# Patient Record
Sex: MALE | Race: WHITE | HISPANIC OR LATINO | Employment: PART TIME | URBAN - METROPOLITAN AREA
[De-identification: names, ages, dates, MRNs, and addresses within clinical notes are randomized per-mention and may not be internally consistent; named-entity substitution may affect disease eponyms.]

---

## 2018-05-04 ENCOUNTER — APPOINTMENT (OUTPATIENT)
Dept: TRANSPLANT | Facility: CLINIC | Age: 63
End: 2018-05-04
Attending: INTERNAL MEDICINE
Payer: MEDICARE

## 2018-05-04 ENCOUNTER — TELEPHONE (OUTPATIENT)
Dept: TRANSPLANT | Facility: CLINIC | Age: 63
End: 2018-05-04

## 2018-05-04 VITALS — HEIGHT: 69 IN | WEIGHT: 191 LBS | BODY MASS INDEX: 28.29 KG/M2

## 2018-05-04 NOTE — TELEPHONE ENCOUNTER
Intake Progress Note    Organ: Liver    Initial Call Made by: referral from [facility]    Referring Physician: Dr David Champagne    Assigned Coordinator: Noelle Carlton    Reported Diagnosis: Cirrhosis- Liver Cancer    On Dialysis:No    Dialysis Schedule:  Days/shift  or N/A    Reported Medical History:  Spoke with  -Cirrhosis- liver lesion- Inpt at [facility]    Records:  I will request.     Clinic RN Appt (Only Adult Kidney, Liver and Pancreas Referrals): Y or N    Preferred Evaluation Start Date:  ASAP     Online Forms    Best time patient can be reached:  anytime    Type of packet sent:  Liver packet    Misc. Notes: May speak with emergency contacts listed in OTTR.     Verbal Consent: Y or N    Email Consent: Y or N    If no PCP or referring information the time of call informed pt to call back with information: Y or N    Informed patient to call if doesn't receive packet and or phone call from coordinator within given time    Insurance-Medicare- [id number]- stated daughter working on insurance coverage- 80% Medicare and a supplement of the rest

## 2018-05-04 NOTE — LETTER
May 4, 2018    Loy Limon  [address]  [place] [address]    Dear Loy,    Thank you for your interest in the Transplant Center at [facility], [facility]. We look forward to being a part of your care team and assisting you through the transplant process.    As we discussed, your transplant coordinator is Noelle Carlton (Liver).  You may call your coordinator at any time with questions or concerns, call [phone] option [phone].    Please complete the following.    1. Fill out and return the enclosed forms    Authorization for Electronic Communication    Authorization to Discuss Protected Health Information    [employer] Release of Information    2. Sign up for:    [employer], access to your electronic medical record (see enclosed pamphlet)    [employer], a transplant education website (see enclosed booklet)    You can use these tools to learn more about your transplant, communicate with your care team, and track your medical details    Sincerely,    Solid Organ Transplant  [facility], [facility]    cc: Care Team

## 2018-05-08 ENCOUNTER — HOSPITAL ENCOUNTER (INPATIENT)
Dept: GENERAL RADIOLOGY | Facility: CLINIC | Age: 63
End: 2018-05-08
Attending: INTERNAL MEDICINE

## 2018-05-08 PROBLEM — K74.60 CIRRHOSIS OF LIVER (H): Status: ACTIVE | Noted: 2018-05-08

## 2018-05-08 PROBLEM — K76.9 LIVER LESION: Status: ACTIVE | Noted: 2018-05-08

## 2018-05-08 NOTE — TELEPHONE ENCOUNTER
61yo with hx alcoholic cirrhosis and new liver lesions, referred for treatment of lesions and transplant evaluation by Dr. Champgane at [facility]. Pt diagnosed with cirrhosis during a work up of abd pain in March 2018. Pt reports having quit drinking and smoking immediately following diagnosis of liver disease, last drink March 2018. Pt has not completed any CD treatment. Pt works part time, unable to work full time due to his health. Pt lives with his wife who works full time. Pt quit smoking a few weeks ago, prior to quitting smoked 1 pack per week for 40 years. Pt denies history or current drug use.     MELD: 13, per referring note from 3/15/18  Imaging: MRI done 3/16/18 at [facility]  Ascites: none  HE: none  GIB: none  EGD: last 4/4/18 at [facility], grade 2 varices[facility]  Colonscopy: last 3/28/18 at [facility], see [facility]     Plan:     Need to confirm insurance coverage- message sent to finance department.     Outside read of MRI order placed and plan for review at tumor conference Friday to determine plan of care.     Evaluation vs consult with Dr. Colvin on 5/21/18 due to last alcohol use and pending review of Antonio criteria.

## 2018-05-09 ENCOUNTER — MEDICAL CORRESPONDENCE (OUTPATIENT)
Dept: TRANSPLANT | Facility: CLINIC | Age: 63
End: 2018-05-09

## 2018-05-11 ENCOUNTER — DOCUMENTATION ONLY (OUTPATIENT)
Dept: TRANSPLANT | Facility: CLINIC | Age: 63
End: 2018-05-11

## 2018-05-11 NOTE — Clinical Note
Noelle - patient / family working on getting insurance. Checked in with Roya who is working on it with them. -tk

## 2018-05-14 NOTE — PROGRESS NOTES
3/16/18 MR at [facility]  1.) 3.7 cm 5B seg 8  2.) 1 cm 5a seg 8  3.) scattered OPTN 3s & 4s    Recs:  - get insurance / PA secure  - staging  - repeat MRI at [facility] given outside image done 2 months ago  - IR consult for treatment  - transplant evaluation  - oncology

## 2018-05-17 NOTE — TELEPHONE ENCOUNTER
Insurance coverage still pending. Transplant finance working with pt and his family to secure coverage. Plan to schedule pt as soon as coverage in place.     Call made to referring provider and message left updating him of above.

## 2018-05-23 ENCOUNTER — TELEPHONE (OUTPATIENT)
Dept: TRANSPLANT | Facility: CLINIC | Age: 63
End: 2018-05-23

## 2018-05-23 DIAGNOSIS — C22.0 HEPATOCELLULAR CARCINOMA (H): ICD-10-CM

## 2018-05-23 DIAGNOSIS — K74.60 CIRRHOSIS (H): ICD-10-CM

## 2018-05-23 NOTE — TELEPHONE ENCOUNTER
May 23, 2018: I spoke with Loy with the help of an  to ask him about attending appointments tomorrow (Thurs, 5/24) and next Tuesday (5/29). He asked me to call his daughter to discuss the schedule and transportation.    I called Kaylene, and she agreed to bring her father to appointments as stated above. I assured her I would send a PDF of his schedule via email. She was elated and will do what it takes to get her father to these appointments.    Frances Vasquez  Pre-Liver Transplant   [phone]    Previous Messages       ----- Message -----      From: Noelle Carlton, RN      Sent: 5/23/2018   1:17 PM        To: Frances Daniels,     Pt needs MRI, chest CT and bone scan prior to IR and onc consults ASAP.     Pt also put in for consult with Dr. Carballo 5/29/18 (IR priority, okay if we have to move hepatology consult to accommodate). Orders in place.     Thanks a lot,   Noelle                       Orders Only  5/23/2018       Noelle Carlton, RN - [facility] Solid Organ Transplant Encounter Summary       Diagnoses       Cirrhosis (H); Hepatocellular carcinoma (H)              Orders Signed This Encounter (15)      NUTRITION REFERRAL         REFERRAL        CBC with platelets        INR        AFP tumor marker        Hepatic panel        Basic metabolic panel        Antibody titer red cell        ABO/Rh type and screen        MRI Abdomen w & w/o contrast [IXJ783]        NM Bone whole body [AJD5535]        CT Chest w & w/o contrast [[id number]]        IR REFERRAL        ONC/HEME ADULT REFERRAL        GASTROENTEROLOGY ADULT REF CONSULT ONLY

## 2018-05-23 NOTE — LETTER
May 23, 2018  LIVER CONSULT SCHEDULE    Patient:   Loy Limon  MR#:    [id number]  Coordinator:  Noelle Carlton RN  [phone]  :    Frances HAYNES   [phone]  Location:    [facility]  Dates:   May 24, May 29 and May 31, 2018    This is your consultation schedule, please follow dates and times. You will receive reminder phone calls for other tests, but please follow this schedule only! If you have any questions about dates and times, please call us on the number listed above. Thank you, Transplant Services       No food or drink after midnight for MRI and CT (you may only have small amounts of water)     Day/Date:  Thursday, May 24, 2018  Time Location Activity   7:00 am Adult Imaging  ([address] [place] [address];  [address] [place] [address]; [facility] [address]) Abdominal MRI  NO FOOD OR DRINK AFTER MIDNIGHT   8:00 am Adult Imaging  ([address] [place] [address]) Bone Scan Injection   8:30 am Adult Imaging  ([address] [place] [address]) Chest CT  NO FOOD OR DRINK AFTER MIDNIGHT   11:00 am  to  12:00 pm Adult Imaging  ([address] [place] [address]) Bone Scan  (no fasting is required for this scan)     * IF ONLINE CHECK IN IS NOT DONE, YOU WILL NEED TO CHECK IN AT LEAST 15 MINUTES EARLIER THAN THE FIRST SCHEDULED APPOINTMENT *          Day/Date:  Tuesday, May 29, 2018  Time Location Activity   7:30 am Imaging and Lab testing  ([address] [place] [address] [facility],  [address] [place] [address]; [facility] [address]) Blood tests    8:00 am Transplant Services  ([address] [place] [address] and [facility]) Review evaluation process & daily schedule   8:30 am Transplant Services  ([address] [place] [address] and [facility]) Appointment with Uma Thacker  Registered Dietitian   9:00 am Medicine Specialties  ([address] [place] [address] and [facility]) Appointment with Dr. Carballo,  Hepatologist   10:00 am Transplant Services  ([address] [place] [address] and [facility]) Appointment with Peyton  Sophie     11:15 am Transplant Services  (3rd floor Clinics and [facility]) Check out with the Nursing Staff       Day/Date:  Thursday, May 31, 2018  Time Location Activity   4:00 pm [facility]  (2nd floor Clinics and Surgery Center,  [address] [place] [address]; [facility] [address]) Appointment with Dr. Melgar,  Oncologist

## 2018-05-24 ENCOUNTER — HOSPITAL ENCOUNTER (OUTPATIENT)
Dept: MRI IMAGING | Facility: CLINIC | Age: 63
End: 2018-05-24
Attending: INTERNAL MEDICINE
Payer: MEDICARE

## 2018-05-24 ENCOUNTER — HOSPITAL ENCOUNTER (OUTPATIENT)
Dept: NUCLEAR MEDICINE | Facility: CLINIC | Age: 63
Setting detail: NUCLEAR MEDICINE
Discharge: HOME | End: 2018-05-24
Attending: INTERNAL MEDICINE | Admitting: INTERNAL MEDICINE
Payer: MEDICARE

## 2018-05-24 ENCOUNTER — HOSPITAL ENCOUNTER (OUTPATIENT)
Dept: CT IMAGING | Facility: CLINIC | Age: 63
Discharge: HOME | End: 2018-05-24
Attending: INTERNAL MEDICINE | Admitting: INTERNAL MEDICINE
Payer: MEDICARE

## 2018-05-24 ENCOUNTER — HOSPITAL ENCOUNTER (OUTPATIENT)
Dept: NUCLEAR MEDICINE | Facility: CLINIC | Age: 63
Setting detail: NUCLEAR MEDICINE
End: 2018-05-24
Attending: INTERNAL MEDICINE
Payer: MEDICARE

## 2018-05-24 DIAGNOSIS — K74.60 CIRRHOSIS (H): ICD-10-CM

## 2018-05-24 PROCEDURE — 25000128 H RX IP 250 OP 636: Performed by: INTERNAL MEDICINE

## 2018-05-24 PROCEDURE — A9585 GADOBUTROL INJECTION: HCPCS | Performed by: INTERNAL MEDICINE

## 2018-05-24 PROCEDURE — 74183 MRI ABD W/O CNTR FLWD CNTR: CPT

## 2018-05-24 PROCEDURE — A9503 TC99M MEDRONATE: HCPCS | Performed by: INTERNAL MEDICINE

## 2018-05-24 PROCEDURE — 71260 CT THORAX DX C+: CPT

## 2018-05-24 PROCEDURE — 25000125 ZZHC RX 250: Performed by: INTERNAL MEDICINE

## 2018-05-24 PROCEDURE — 34300033 ZZH RX 343: Performed by: INTERNAL MEDICINE

## 2018-05-24 PROCEDURE — 78306 BONE IMAGING WHOLE BODY: CPT

## 2018-05-24 PROCEDURE — T1013 SIGN LANG/ORAL INTERPRETER: HCPCS | Mod: U3

## 2018-05-24 RX ORDER — GADOBUTROL 604.72 MG/ML
10 INJECTION INTRAVENOUS ONCE
Status: COMPLETED | OUTPATIENT
Start: 2018-05-24 | End: 2018-05-24

## 2018-05-24 RX ORDER — IOPAMIDOL 755 MG/ML
100 INJECTION, SOLUTION INTRAVASCULAR ONCE
Status: COMPLETED | OUTPATIENT
Start: 2018-05-24 | End: 2018-05-24

## 2018-05-24 RX ORDER — TC 99M MEDRONATE 20 MG/10ML
22.2 INJECTION, POWDER, LYOPHILIZED, FOR SOLUTION INTRAVENOUS ONCE
Status: COMPLETED | OUTPATIENT
Start: 2018-05-24 | End: 2018-05-24

## 2018-05-24 RX ADMIN — GADOBUTROL 8.5 ML: 604.72 INJECTION INTRAVENOUS at 07:23

## 2018-05-24 RX ADMIN — TC 99M MEDRONATE 22.2 MCI.: 20 INJECTION, POWDER, LYOPHILIZED, FOR SOLUTION INTRAVENOUS at 08:30

## 2018-05-24 RX ADMIN — SODIUM CHLORIDE 71 ML: 9 INJECTION, SOLUTION INTRAVENOUS at 08:39

## 2018-05-24 RX ADMIN — IOPAMIDOL 94 ML: 755 INJECTION, SOLUTION INTRAVENOUS at 08:39

## 2018-05-24 RX ADMIN — SODIUM CHLORIDE 80 ML: 9 INJECTION, SOLUTION INTRAVENOUS at 07:24

## 2018-05-29 ENCOUNTER — APPOINTMENT (OUTPATIENT)
Dept: LAB | Facility: CLINIC | Age: 63
End: 2018-05-29
Payer: MEDICARE

## 2018-05-29 ENCOUNTER — OFFICE VISIT (OUTPATIENT)
Dept: GASTROENTEROLOGY | Facility: CLINIC | Age: 63
End: 2018-05-29
Attending: INTERNAL MEDICINE
Payer: MEDICARE

## 2018-05-29 ENCOUNTER — COMMITTEE REVIEW (OUTPATIENT)
Dept: TRANSPLANT | Facility: CLINIC | Age: 63
End: 2018-05-29

## 2018-05-29 ENCOUNTER — TELEPHONE (OUTPATIENT)
Dept: TRANSPLANT | Facility: CLINIC | Age: 63
End: 2018-05-29

## 2018-05-29 ENCOUNTER — ALLIED HEALTH/NURSE VISIT (OUTPATIENT)
Dept: TRANSPLANT | Facility: CLINIC | Age: 63
End: 2018-05-29
Attending: INTERNAL MEDICINE
Payer: MEDICARE

## 2018-05-29 ENCOUNTER — OFFICE VISIT (OUTPATIENT)
Dept: TRANSPLANT | Facility: CLINIC | Age: 63
End: 2018-05-29
Attending: TRANSPLANT SURGERY
Payer: MEDICARE

## 2018-05-29 VITALS
HEIGHT: 68 IN | BODY MASS INDEX: 27.34 KG/M2 | TEMPERATURE: 98.3 F | HEART RATE: 71 BPM | WEIGHT: 180.4 LBS | SYSTOLIC BLOOD PRESSURE: 138 MMHG | OXYGEN SATURATION: 95 % | DIASTOLIC BLOOD PRESSURE: 69 MMHG

## 2018-05-29 VITALS
WEIGHT: 180.4 LBS | TEMPERATURE: 98.3 F | HEIGHT: 68 IN | DIASTOLIC BLOOD PRESSURE: 69 MMHG | RESPIRATION RATE: 18 BRPM | HEART RATE: 71 BPM | SYSTOLIC BLOOD PRESSURE: 138 MMHG | BODY MASS INDEX: 27.34 KG/M2 | OXYGEN SATURATION: 95 %

## 2018-05-29 DIAGNOSIS — K76.82 HEPATIC ENCEPHALOPATHY (H): Primary | ICD-10-CM

## 2018-05-29 DIAGNOSIS — F10.11 ALCOHOL ABUSE, IN REMISSION: ICD-10-CM

## 2018-05-29 DIAGNOSIS — C22.0 HEPATOCELLULAR CARCINOMA (H): ICD-10-CM

## 2018-05-29 DIAGNOSIS — K74.60 CIRRHOSIS (H): ICD-10-CM

## 2018-05-29 DIAGNOSIS — K70.30 ALCOHOLIC CIRRHOSIS OF LIVER WITHOUT ASCITES (H): Primary | ICD-10-CM

## 2018-05-29 DIAGNOSIS — Z76.82 AWAITING ORGAN TRANSPLANT STATUS: Primary | ICD-10-CM

## 2018-05-29 DIAGNOSIS — Z11.59 ENCOUNTER FOR SCREENING FOR OTHER VIRAL DISEASES (CODE): ICD-10-CM

## 2018-05-29 DIAGNOSIS — K74.60 CIRRHOSIS (H): Primary | ICD-10-CM

## 2018-05-29 DIAGNOSIS — F10.21 ALCOHOL DEPENDENCE IN REMISSION (H): ICD-10-CM

## 2018-05-29 DIAGNOSIS — K70.30 ALCOHOLIC CIRRHOSIS OF LIVER WITHOUT ASCITES (H): ICD-10-CM

## 2018-05-29 LAB
ABO + RH BLD: NORMAL
ABO + RH BLD: NORMAL
AFP SERPL-MCNC: 4 UG/L (ref 0–8)
ALBUMIN SERPL-MCNC: 2.6 G/DL (ref 3.4–5)
ALP SERPL-CCNC: 299 U/L (ref 40–150)
ALT SERPL W P-5'-P-CCNC: 64 U/L (ref 0–70)
ANION GAP SERPL CALCULATED.3IONS-SCNC: 6 MMOL/L (ref 3–14)
AST SERPL W P-5'-P-CCNC: 79 U/L (ref 0–45)
BILIRUB DIRECT SERPL-MCNC: 1.2 MG/DL (ref 0–0.2)
BILIRUB SERPL-MCNC: 2.2 MG/DL (ref 0.2–1.3)
BLD GP AB SCN SERPL QL: NORMAL
BLD GP AB SCN TITR SERPL: NORMAL {TITER}
BLOOD BANK CMNT PATIENT-IMP: NORMAL
BUN SERPL-MCNC: 12 MG/DL (ref 7–30)
CALCIUM SERPL-MCNC: 8.4 MG/DL (ref 8.5–10.1)
CHLORIDE SERPL-SCNC: 106 MMOL/L (ref 94–109)
CO2 SERPL-SCNC: 24 MMOL/L (ref 20–32)
CREAT SERPL-MCNC: 0.58 MG/DL (ref 0.66–1.25)
ERYTHROCYTE [DISTWIDTH] IN BLOOD BY AUTOMATED COUNT: 13.8 % (ref 10–15)
GFR SERPL CREATININE-BSD FRML MDRD: >90 ML/MIN/1.7M2
GLUCOSE SERPL-MCNC: 266 MG/DL (ref 70–99)
HCT VFR BLD AUTO: 37.1 % (ref 40–53)
HGB BLD-MCNC: 12.7 G/DL (ref 13.3–17.7)
INR PPP: 1.53 (ref 0.86–1.14)
MCH RBC QN AUTO: 33.4 PG (ref 26.5–33)
MCHC RBC AUTO-ENTMCNC: 34.2 G/DL (ref 31.5–36.5)
MCV RBC AUTO: 98 FL (ref 78–100)
PLATELET # BLD AUTO: 94 10E9/L (ref 150–450)
POTASSIUM SERPL-SCNC: 4.6 MMOL/L (ref 3.4–5.3)
PROT SERPL-MCNC: 6.9 G/DL (ref 6.8–8.8)
RBC # BLD AUTO: 3.8 10E12/L (ref 4.4–5.9)
SODIUM SERPL-SCNC: 136 MMOL/L (ref 133–144)
SPECIMEN EXP DATE BLD: NORMAL
WBC # BLD AUTO: 3 10E9/L (ref 4–11)

## 2018-05-29 PROCEDURE — 80076 HEPATIC FUNCTION PANEL: CPT | Performed by: INTERNAL MEDICINE

## 2018-05-29 PROCEDURE — 86900 BLOOD TYPING SEROLOGIC ABO: CPT | Performed by: INTERNAL MEDICINE

## 2018-05-29 PROCEDURE — 80048 BASIC METABOLIC PNL TOTAL CA: CPT | Performed by: INTERNAL MEDICINE

## 2018-05-29 PROCEDURE — 85610 PROTHROMBIN TIME: CPT | Performed by: INTERNAL MEDICINE

## 2018-05-29 PROCEDURE — T1013 SIGN LANG/ORAL INTERPRETER: HCPCS | Mod: U3,ZF

## 2018-05-29 PROCEDURE — 85027 COMPLETE CBC AUTOMATED: CPT | Performed by: INTERNAL MEDICINE

## 2018-05-29 PROCEDURE — 80321 ALCOHOLS BIOMARKERS 1OR 2: CPT | Performed by: INTERNAL MEDICINE

## 2018-05-29 PROCEDURE — G0463 HOSPITAL OUTPT CLINIC VISIT: HCPCS | Mod: ZF

## 2018-05-29 PROCEDURE — 40000724 ZZH UMP OPEN ENCOUNTER >70 DAYS

## 2018-05-29 PROCEDURE — 86886 COOMBS TEST INDIRECT TITER: CPT | Performed by: INTERNAL MEDICINE

## 2018-05-29 PROCEDURE — 86850 RBC ANTIBODY SCREEN: CPT | Performed by: INTERNAL MEDICINE

## 2018-05-29 PROCEDURE — 86901 BLOOD TYPING SEROLOGIC RH(D): CPT | Performed by: INTERNAL MEDICINE

## 2018-05-29 PROCEDURE — 36415 COLL VENOUS BLD VENIPUNCTURE: CPT | Performed by: INTERNAL MEDICINE

## 2018-05-29 PROCEDURE — 82105 ALPHA-FETOPROTEIN SERUM: CPT | Performed by: INTERNAL MEDICINE

## 2018-05-29 RX ORDER — TRAMADOL HYDROCHLORIDE 50 MG/1
50 TABLET ORAL
COMMUNITY
Start: 2018-03-08 | End: 2018-08-07

## 2018-05-29 ASSESSMENT — PAIN SCALES - GENERAL
PAINLEVEL: NO PAIN (0)
PAINLEVEL: NO PAIN (0)

## 2018-05-29 NOTE — PROGRESS NOTES
"Psychosocial Assessment for Liver Transplant Evaluation  Loy Limon was seen in the Transplant Center as part of his evaluation as a potential liver transplant recipient.  He was accompanied by his daughter Kaylene and a Austrian speaking .  Identifying information: Loy \"Washington" is a [age] year old [place] man who came to the [place] thirty-one years ago.  He is a [place] Citizen.    Living Situation:  Claribel lives with his wife Kevin Kruse in an apartment in [place], [place].  They have lived in the same apartment for the past twelve years, and Claribel denies any concerns with his living situation.  Education/Employment:  Claribel has never had any formal education in [place] or the [place].  He has been employed by [employer] for a year and seven months.  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  Claribel does not have short or long term disability benefits through his employer.    Financial /Income: Claribel and his wife have income from their employment.  Claribel should apply for SSDI benefits if he reduces his work schedule.  Health Insurance:  Claribel has applied for Medical Assistance for Employed Persons Disabled though he is currently uninsured.  Claribel will also become eligible for Medicare on June 1st, 2018.  He missed the initial enrollment period which would have made him eligible on May 1st, 2018.  His daughter Kaylene is helping him get connected to these insurance programs.  This writer talked with Claribel and his daughter about the financial risks of transplant, particularly about the high cost of transplant related medications and the importance of maintaining adequate health insurance coverage.  Family/Social Support: Claribel is  from his first wife, and he has been remarried to Kevin for the past ten years.  She is working today and was unable to attend Claribel' appointments.  Claribel has three adult children from his " first marriage.  His daughter Kaylene is the most involved, and she lives in [place], [place].  Claribel' two sons work construction and would not be available to provide care giving.  His daughter-in-law Cristina and granddaughter Elinor may be able to assist some.  They live in [place].  Claribel' father is  and his mother lives in [place], [place].  Claribel has one brother in [place] and five sisters all living in parts of [place].  This writer stressed the importance of having a stable and involved support network before and after transplant.  Provided Mitch and his daughter with education about the relationship between a stable support system and better surgical and post-transplant outcomes compared to patients with a limited support system.    Functional Status: Claribel's daughter assists with his medication management.  Claribel is otherwise independent with his ambulation, personal cares and driving.  Chemical Dependency:  Claribel quit smoking cigarettes four months ago.  He had been smoking two packs per week and some cigars.  Claribel denies any pain medication abuse/overuse or illicit drug use.  Claribel has Alcoholic Cirrhosis, diagnosed in March of this year, and that is when he quit drinking.  He did not have any prior knowledge of his liver disease.  Claribel estimates he was drinking two days per week, on the weekends he would drink a 24 pack of beer on Saturday and 6 beers on .   Claribel does have a history of one DUI nine years ago.  He has no chemical dependency treatment history.  Claribel did have a four year period of sobriety fourteen years ago.  Of note, one of Claribel' brothers  of alcoholic cirrhosis.  Mental Health: Claribel denies any mental health history, though he does report passive suicidal thoughts eighteen years ago.  He did contract with me today that he would contact his daughter Kaylene if he has any future suicidal thoughts.    Claribel denies any history of mental health treatment or  "hospitalization for mental health treatment.  Adjustment to Illness:  Claribel has Alcoholic Cirrhosis and Hepatocellular Carcinoma.  He says he has \"resigned\" himself to needing a liver transplant, and he says he is \"ready\" for whatever is recommended.    This writer provided Claribel and his daughter  with supportive counseling throughout this interview.  This writer also encouraged Claribel and his daughter to attend the liver transplant support group for additional support and encouragement.   Impression/Recommendations:   Claribel and his daughter Kaylene verbalize understanding the psychosocial risks of transplant and teaching provided during this evaluation.  Claribel has been sober since March of this year when diagnosed with Alcoholic Cirrhosis.  He does not yet meet the sobriety criteria recommended for listing.  This writer recommends Claribel complete a chemical dependency evaluation and follow any treatment recommendations.  Loy will need to complete a Rule 25 chemical dependency evaluation in [facility].  He has been given information about locations where this is provided.  I did suggest [facility] as they would have interpreters readily available.  Claribel has a stable living situation and adequate finances.  His health insurance will be adequate if/when his [place] Medical Assistance application is approved.  His daughter Kaylene is present today and demonstrating her support of patient.  We discussed the Caregiver Agreement for Liver Transplantation.  It will be important for Claribel' wife to attend the transplant education class and this was discussed with Claribel.   This writer will remain available to assist patient throughout the evaluation process and will follow patient through transplant if he is listed.  It was a pleasure to evaluate this patient for liver transplant.   Teaching completed during assessment:  1.     Housing and relocation needs post transplant.  2.     Caregiver needs post transplant.  3.     " Financial issues related to transplant.  4.     Risks of alcohol use post transplant.  5.     Common psychosocial stressors pre/post transplant.          6.     Liver Transplant support group availability.          7.     [facility] Directive-form and education provided             Psychosocial Risks of Transplant Reviewed:  1.     Increased stress related to your emotional, family, social, employment, or   financial situation.  2.     Affect on work and/or disability benefits.  3.     Affect on future health and life insurance.  4.     Transplant outcome expectations may not be met.  5.     Mental Health risks: anxiety, depression, PTSD, guilt, grief and chronic fatigue.     [employer]

## 2018-05-29 NOTE — MR AVS SNAPSHOT
After Visit Summary   5/29/2018    Loy Limon    MRN: [id number]           Patient Information     Date Of Birth          1955        Visit Information        Provider Department      5/29/2018 8:30 AM Víctor Smith Oca; Yazmin Thacker RD [facility] Solid Organ Transplant        Today's Diagnoses     Cirrhosis (H)    -  1       Follow-ups after your visit        Your next 10 appointments already scheduled     May 29, 2018  9:45 AM CDT   SOT SOCIAL WORK EVAL with ABRAHAM Prater   [facility] Solid Organ Transplant ([facility])    [address] [place] [address]  [address]  [facility] [address]   [phone]            May 31, 2018  3:45 PM CDT   New Patient Visit with Hi Melgar MD   [facility] ([facility])    [address] [place] [address]  [address]  [facility] [id number] [phone]            Jun 05, 2018  3:15 PM CDT   (Arrive by 3:00 PM)   New Patient Visit with Dario Anguiano MD   [facility] ([facility])    [address] [place] [address]  [address]  [facility] [address] [phone]              Who to contact     If you have questions or need follow up information about today's clinic visit or your schedule please contact [facility] SOLID ORGAN TRANSPLANT directly at [phone].  Normal or non-critical lab and imaging results will be communicated to you by MyChart, letter or phone within 4 business days after the clinic has received the results. If you do not hear from us within 7 days, please contact the clinic through MyChart or phone. If you have a critical or abnormal lab result, we will notify you by phone as soon as possible.  Submit refill requests through [employer]) or call your pharmacy and they will forward the refill request to us. Please allow 3 business days for your refill to be completed.          Additional Information About Your Visit        [facility]  "Information     [employer] lets you send messages to your doctor, view your test results, renew your prescriptions, schedule appointments and more. To sign up, go to [email or web address] . Click on \"Log in\" on the left side of the screen, which will take you to the Welcome page. Then click on \"Sign up Now\" on the right side of the page.     You will be asked to enter the access code listed below, as well as some personal information. Please follow the directions to create your username and password.     Your access code is: [email or web address]  Expires: 2018  8:03 AM     Your access code will  in 90 days. If you need help or a new code, please call your [facility] clinic or [phone].        Care EveryWhere ID     This is your Care EveryWhere ID. This could be used by other organizations to access your [facility] medical records  [id number]         Blood Pressure from Last 3 Encounters:   18 138/69   18 138/69    Weight from Last 3 Encounters:   18 81.8 kg (180 lb 6.4 oz)   18 81.8 kg (180 lb 6.4 oz)   18 86.6 kg (191 lb)              Today, you had the following     No orders found for display       Primary Care Provider    [facility]       [address] [address]        Equal Access to Services     [facility]: Hadii aad ku hadasho Soomaali, waaxda luqadaha, qaybta kaalmada adeshamayada, javier rico . So [facility] [place] [facility] [phone].    ATENCIÓN: Si habla español, tiene a samaniego disposición servicios gratuitos de asistencia lingüística. Llame al [phone].    We comply with applicable federal civil rights laws and [place] laws. We do not discriminate on the basis of race, color, national origin, age, disability, sex, sexual orientation, or gender identity.            Thank you!     Thank you for choosing [facility] SOLID ORGAN TRANSPLANT  for your care. Our goal is always to provide you with excellent care. Hearing back " from our patients is one way we can continue to improve our services. Please take a few minutes to complete the written survey that you may receive in the mail after your visit with us. Thank you!             Your Updated Medication List - Protect others around you: Learn how to safely use, store and throw away your medicines at www.disposemymeds.org.          This list is accurate as of 5/29/18  8:58 AM.  Always use your most recent med list.                   Brand Name Dispense Instructions for use Diagnosis    omeprazole 20 MG CR capsule    priLOSEC     Take 20 mg by mouth        traMADol 50 MG tablet    ULTRAM     Take 50 mg by mouth

## 2018-05-29 NOTE — LETTER
Date:May 31, 2018      Patient was self referred, no letter generated. Do not send.        [facility] Health Information

## 2018-05-29 NOTE — COMMITTEE REVIEW
Abdominal Committee Review Note     Evaluation Date: 5/29/2018  Committee Review Date: 5/29/2018    Organ being evaluated for: Liver    Transplant Phase: Evaluation  Transplant Status: Active    Transplant Coordinator: Noelle Carlton  Transplant Surgeon:       Referring Physician: Dr. David Champagne    Primary Diagnosis: HCC  Secondary Diagnosis: cirrhosis    Committee Review Members:  Nutrition Kathrin Thacker, RD   Pharmacy Kenrick Benitez, [facility]    - Clinical Peyton Barrios, ABRAHAM, Asmita Engel, [facility]   Transplant Leena Colvin MD, Jeny Szymanski, RN, Lisa White, RN, Jr Gerhard Freeman, RN, Camille Patel, APRN CNP, Becky Cruz, RN, Izabela Oropeza, RN, Tamela Carballo MD, Xander Nunes MD, Noelle Carlton, DALLIN, Elvia Field MD, Jovanni Harris MD       Transplant Eligibility: Cirrhosis with MELD, HCC    Committee Review Decision: Needs Re-presentation    Relative Contraindications: None    Absolute Contraindications: None    Committee Chair Dr. Carballo verbally attested to the committee's decision.    Committee Discussion Details:     Move forward with transplant evaluation.     Follow up with Dr. Carballo in 3 months.     HCC treatment as planned, IR and oncology referrals in place.

## 2018-05-29 NOTE — LETTER
5/29/2018       RE: Loy Limon  [address]  [facility] [address]     Dear Colleague,    Thank you for referring your patient, Loy Limon, to the [facility] SOLID ORGAN TRANSPLANT at [facility]. Please see a copy of my visit note below.    Assessment and Plan:  1. liver transplant evaluation - patient is a good candidate overall. Benefits and surgical risks of a liver transplantation were discussed.  2.  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 Portal Vein:Patent  2. Hepatic Artery: Open  3. TIPS: absent  4. Previous Abdominal Surgery: Yes  5. Hepatocellular Carcinoma: Yes   6. Ascites: Present - minimal  7. Costal Angle: wide  8. Portopulmonary Hypertension: absent  9. Hepatopulmonary Syndrome: absent  10. Cardiac Evaluation: needs echocardiogram  11. Nutritional Status: Moderate  12. Diabetes: No  13.Hypertension no  14. Smoker:no  115: Fraility index:no      Recommendations: cardiology, chemo embolization. Needs to see a Urologist ; needs to meet sobriety guidelines      Patients overall evaluation will be discussed at the Liver Transplant selection committee meeting with a final recommendation on the patients suitability for transplant to be made at that time.    Consult Full  Details:  Loy Limon was seen in consultation at the request of Dr. Champagne for evaluation as a potential liver transplant recipient.    Reason for Visit:  Loy Limon is a 63 year old year old male with hepatocelluar carcinoma, who presents for liver transplant evaluation.    HPI:  Presenting complaint: Liver cancer    Past Medical History:   Diagnosis Date     Cirrhosis of liver (H) 5/8/2018     Enlarged prostate      Inguinal hernia      Liver lesion 5/8/2018     Past Surgical History:   Procedure Laterality Date     APPENDECTOMY       Past Surgical History:   Procedure Laterality Date     APPENDECTOMY        No family history on file.  No Known Allergies  Prior to Admission medications    Medication Sig Start Date End Date Taking? Authorizing Provider   omeprazole (PRILOSEC) 20 MG CR capsule Take 20 mg by mouth 4/4/18 4/4/19  Reported, Patient   traMADol (ULTRAM) 50 MG tablet Take 50 mg by mouth 3/8/18   Reported, Patient       Previous Transplant Hx: No    Cardiovascular Hx:       h/o Cardiac Issues: No       Exercise Tolerance: shortness of breath with exertion.    Potential Donor(s): Yes -  [facility]    ROS:    REVIEW OF SYSTEMS (check box if normal)  [x]                GENERAL  [x]                  PULMONARY []                 GENITOURINARY: difficulty in urination  [x]                 CNS                 [x]                  CARDIAC  [x]                  ENDOCRINE  [x]                 EARS,NOSE,THROAT [x]                  GASTROINTESTINAL [x]                  NEUROLOGIC    [x]                 MUSCLOSKELTAL  [x]                   HEMATOLOGY    Examination:     Vitals:  There were no vitals taken for this visit.    GENERAL APPEARANCE: alert and no distress  EYES: PERRL  HENT: mouth without ulcers or lesions  NECK: supple, no adenopathy  RESP: lungs clear to auscultation - no rales, rhonchi or wheezes  CV: regular rhythm, normal rate, no rub   ABDOMEN:  soft, nontender, no HSM or masses and bowel sounds normal  MS: extremities normal- no gross deformities noted, no evidence of inflammation in joints, no muscle tenderness  SKIN: no rash  NEURO: Normal strength and tone, sensory exam grossly normal, mentation intact and speech normal  PSYCH: mentation appears normal. and affect normal/bright      Results:   Recent Results (from the past 168 hour(s))   ABO/Rh type and screen    Collection Time: 05/29/18  7:53 AM   Result Value Ref Range    ABO PENDING     Antibody Screen PENDING     Test Valid Only At          [facility]    Specimen Expires  06/01/2018    Basic metabolic panel    Collection Time: 05/29/18  7:54 AM   Result Value Ref Range    Sodium 136 133 - 144 mmol/L    Potassium 4.6 3.4 - 5.3 mmol/L    Chloride 106 94 - 109 mmol/L    Carbon Dioxide 24 20 - 32 mmol/L    Anion Gap 6 3 - 14 mmol/L    Glucose 266 (H) 70 - 99 mg/dL    Urea Nitrogen 12 7 - 30 mg/dL    Creatinine 0.58 (L) 0.66 - 1.25 mg/dL    GFR Estimate >90 >60 mL/min/1.7m2    GFR Estimate If Black >90 >60 mL/min/1.7m2    Calcium 8.4 (L) 8.5 - 10.1 mg/dL   Hepatic panel    Collection Time: 05/29/18  7:54 AM   Result Value Ref Range    Bilirubin Direct 1.2 (H) 0.0 - 0.2 mg/dL    Bilirubin Total 2.2 (H) 0.2 - 1.3 mg/dL    Albumin 2.6 (L) 3.4 - 5.0 g/dL    Protein Total 6.9 6.8 - 8.8 g/dL    Alkaline Phosphatase 299 (H) 40 - 150 U/L    ALT 64 0 - 70 U/L    AST 79 (H) 0 - 45 U/L   INR    Collection Time: 05/29/18  7:54 AM   Result Value Ref Range    INR 1.53 (H) 0.86 - 1.14   CBC with platelets    Collection Time: 05/29/18  7:54 AM   Result Value Ref Range    WBC 3.0 (L) 4.0 - 11.0 10e9/L    RBC Count 3.80 (L) 4.4 - 5.9 10e12/L    Hemoglobin 12.7 (L) 13.3 - 17.7 g/dL    Hematocrit 37.1 (L) 40.0 - 53.0 %    MCV 98 78 - 100 fl    MCH 33.4 (H) 26.5 - 33.0 pg    MCHC 34.2 31.5 - 36.5 g/dL    RDW 13.8 10.0 - 15.0 %    Platelet Count 94 (L) 150 - 450 10e9/L     I had a long discussion with the patient regarding liver transplantation which included but was not limited to  the following points:    1. Liver transplant selection committee process.  2. The federal rules for cadaveric waiting list, the size and blood type matching of the organ. The availability of living-related donor transplantation.  3. The types of donors: brain death donors, non-heart beating donors, partial liver grafts: splits and living donor grafts  4. Extended criteria  Donors (older age, steasosis) and the increased  risk of primary non-function using the extended criteria donors  5. The CDC high risk donors,  Risk of donor  transmitted infections and donor transmitted malignancy  6. The liver transplant operation and the associated risks and technical complications which can include intraoperative death, post operative death,  Primary non-function, bleeding requiring re-operations, arterial and biliary complications, bowel perforations, and intra abdominal abscess. Some of these complicaitons may require a second operation.  7. The postoperative course, the ICU stay and risk of postoperative complications which can include sepsis, MI, stroke, brain injury, pneumonia, pleural effusions, and renal dysfunction.  8. The current 1 year and 5 year graft and patient survivals.  9. The need for life long immunosuppressive therapy and the side effects of these medications, including the possibility of toxicity, opportunistic infections, risk of cancer including lymphoma, and the possibility of rejection even if the patient is taking the medication exactly as prescribed.  10. The need for compliance with medications and follow-up visits in the clinic and thereafter.  11. The patient and family understand these risks and wish to proceed to transplantation       I spent 60 minutes with the patient and more than 50% of the time was spend in direct face to face counseling.        Again, thank you for allowing me to participate in the care of your patient.      Sincerely,    Emil Echevarria MD        CC  [facility], ONCOLOGY    Copy to patient     [address]  [facility] [address]

## 2018-05-29 NOTE — MR AVS SNAPSHOT
After Visit Summary   5/29/2018    Loy Limon    MRN: [id number]           Patient Information     Date Of Birth          1955        Visit Information        Provider Department      5/29/2018 8:45 AM Víctor Smith Oca; Tamela Carballo MD [facility] Hepatology        Today's Diagnoses     Hepatic encephalopathy (H)    -  1    Alcoholic cirrhosis of liver without ascites (H)        Alcohol dependence in remission (H)        Alcohol abuse, in remission         Encounter for screening for other viral diseases (CODE)            Follow-ups after your visit        Your next 10 appointments already scheduled     May 31, 2018  3:45 PM CDT   New Patient Visit with Hi Melgar MD   [facility]    [address]  [address]  [facility] [address] [phone]            Jun 01, 2018  9:00 AM CDT   New Patient Visit with Debora Ac MD   [facility] ([facility])    [address]  [address]  [facility] [address] [phone]              Who to contact     If you have questions or need follow up information about today's clinic visit or your schedule please contact [facility] HEPATOLOGY directly at [phone].  Normal or non-critical lab and imaging results will be communicated to you by Engagorhart, letter or phone within 4 business days after the clinic has received the results. If you do not hear from us within 7 days, please contact the clinic through Engagorhart or phone. If you have a critical or abnormal lab result, we will notify you by phone as soon as possible.  Submit refill requests through Broadband Voice or call your pharmacy and they will forward the refill request to us. Please allow 3 business days for your refill to be completed.          Additional Information About Your Visit        Broadband Voice Information     Broadband Voice lets you send messages to your doctor,  "view your test results, renew your prescriptions, schedule appointments and more. To sign up, go to [email or web address] . Click on \"Log in\" on the left side of the screen, which will take you to the Welcome page. Then click on \"Sign up Now\" on the right side of the page.     You will be asked to enter the access code listed below, as well as some personal information. Please follow the directions to create your username and password.     Your access code is: [email or web address]  Expires: 2018  8:03 AM     Your access code will  in 90 days. If you need help or a new code, please call your [place] clinic or [phone].        Care EveryWhere ID     This is your Care EveryWhere ID. This could be used by other organizations to access your [place] medical records  [id number]        Your Vitals Were     Pulse Temperature Respirations Height Pulse Oximetry BMI (Body Mass Index)    71 98.3  F (36.8  C) (Oral) 18 1.721 m (5' 7.75\") 95% 27.63 kg/m2       Blood Pressure from Last 3 Encounters:   18 138/69   18 138/69    Weight from Last 3 Encounters:   18 81.8 kg (180 lb 6.4 oz)   18 81.8 kg (180 lb 6.4 oz)   18 86.6 kg (191 lb)              We Performed the Following     Ethyl Glucuronide Urine          Today's Medication Changes          These changes are accurate as of 18 11:59 PM.  If you have any questions, ask your nurse or doctor.               Start taking these medicines.        Dose/Directions    rifaximin 550 MG Tabs tablet   Commonly known as:  XIFAXAN   Used for:  Hepatic encephalopathy (H)   Started by:  Tamela Carballo MD        Dose:  550 mg   Take 1 tablet (550 mg) by mouth 2 times daily   Quantity:  60 tablet   Refills:  3            Where to get your medicines      These medications were [place] to [facility] -  [address], [facility] [address]     Phone:  [phone]     rifaximin " 550 MG Tabs tablet               Information about OPIOIDS     PRESCRIPTION OPIOIDS: WHAT YOU NEED TO KNOW   You have a prescription for an opioid (narcotic) pain medicine. Opioids can cause addiction. If you have a history of chemical dependency of any type, you are at a higher risk of becoming addicted to opioids. Only take this medicine after all other options have been tried. Take it for as short a time and as few doses as possible.     Do not:    Drive. If you drive while taking these medicines, you could be arrested for driving under the influence (DUI).    Operate heavy machinery    Do any other dangerous activities while taking these medicines.     Drink any alcohol while taking these medicines.      Take with any other medicines that contain acetaminophen. Read all labels carefully. Look for the word  acetaminophen  or  Tylenol.  Ask your pharmacist if you have questions or are unsure.    Store your pills in a secure place, locked if possible. We will not replace any lost or stolen medicine. If you don t finish your medicine, please throw away (dispose) as directed by your pharmacist. The [place] Pollution Control Agency has more information about safe disposal: https://www.pca.[facility].[place].us/living-green/managing-unwanted-medications    All opioids tend to cause constipation. Drink plenty of water and eat foods that have a lot of fiber, such as fruits, vegetables, prune juice, apple juice and high-fiber cereal. Take a laxative (Miralax, milk of magnesia, Colace, Senna) if you don t move your bowels at least every other day.          Primary Care Provider    [facility] MD       [address] [place] [address]        Equal Access to Services     URIEL COWAN : Hadii kostas giraldo [place], waaxda luashleyadaha, qaybta kaalmada javier hill . So [facility] [phone].    ATENCIÓN: Si habla español, tiene a samaniego disposición servicios gratuitos de  asistencia lingüística. Earle al [phone].    We comply with applicable federal civil rights laws and [place] laws. We do not discriminate on the basis of race, color, national origin, age, disability, sex, sexual orientation, or gender identity.            Thank you!     Thank you for choosing [facility] HEPATOLOGY  for your care. Our goal is always to provide you with excellent care. Hearing back from our patients is one way we can continue to improve our services. Please take a few minutes to complete the written survey that you may receive in the mail after your visit with us. Thank you!             Your Updated Medication List - Protect others around you: Learn how to safely use, store and throw away your medicines at www.disposemymeds.org.          This list is accurate as of 5/29/18 11:59 PM.  Always use your most recent med list.                   Brand Name Dispense Instructions for use Diagnosis    omeprazole 20 MG CR capsule    priLOSEC     Take 20 mg by mouth        rifaximin 550 MG Tabs tablet    XIFAXAN    60 tablet    Take 1 tablet (550 mg) by mouth 2 times daily    Hepatic encephalopathy (H)       traMADol 50 MG tablet    ULTRAM     Take 50 mg by mouth

## 2018-05-29 NOTE — MR AVS SNAPSHOT
After Visit Summary   5/29/2018    Loy Limon    MRN: [id number]           Patient Information     Date Of Birth          1955        Visit Information        Provider Department      5/29/2018 11:30 AM Emil Echevarria MD [facility] Solid Organ Transplant        Today's Diagnoses     Alcoholic cirrhosis of liver without ascites (H)    -  1    Hepatocellular carcinoma (H)           Follow-ups after your visit        Your next 10 appointments already scheduled     May 29, 2018 11:30 AM CDT   (Arrive by 11:15 AM)   Liver Transplant Pre-Op with Emil Echevarria MD   [facility] Organ Transplant ([facility])    [address] [place] [address]  [address]  [facility] [address] [phone]            May 31, 2018  3:45 PM CDT   New Patient Visit with Hi Melgar MD   [facility] ([facility])    [address]  [address]  [facility] [address] [phone]            Jun 05, 2018  3:15 PM CDT   (Arrive by 3:00 PM)   New Patient Visit with Dario Anguiano MD   [facility] ([facility])    [address] [place] [address]  [address]  [facility] [id number] [phone]              Future tests that were ordered for you today     Open Future Orders        Priority Expected Expires Ordered    Hepatitis C antibody Routine  6/28/2018 5/29/2018    Hepatitis B surface antigen Routine  6/28/2018 5/29/2018    Hepatitis B core antibody Routine  6/28/2018 5/29/2018    Hepatitis B Surface Antibody Routine  6/28/2018 5/29/2018    Hepatitis Antibody A IgG Routine  6/28/2018 5/29/2018            Who to contact     If you have questions or need follow up information about today's clinic visit or your schedule please contact [facility] SOLID ORGAN TRANSPLANT directly at [phone].  Normal or non-critical lab and imaging results will be communicated to you by MyChart, letter or phone within 4  "business days after the clinic has received the results. If you do not hear from us within 7 days, please contact the clinic through Quid or phone. If you have a critical or abnormal lab result, we will notify you by phone as soon as possible.  Submit refill requests through Quid or call your pharmacy and they will forward the refill request to us. Please allow 3 business days for your refill to be completed.          Additional Information About Your Visit        Quid Information     Quid lets you send messages to your doctor, view your test results, renew your prescriptions, schedule appointments and more. To sign up, go to [email or web address] . Click on \"Log in\" on the left side of the screen, which will take you to the Welcome page. Then click on \"Sign up Now\" on the right side of the page.     You will be asked to enter the access code listed below, as well as some personal information. Please follow the directions to create your username and password.     Your access code is: [email or web address]  Expires: 2018  8:03 AM     Your access code will  in 90 days. If you need help or a new code, please call your [place] clinic or [phone].        Care EveryWhere ID     This is your Care EveryWhere ID. This could be used by other organizations to access your [facility] medical records  [id number]         Blood Pressure from Last 3 Encounters:   18 138/69   18 138/69    Weight from Last 3 Encounters:   18 81.8 kg (180 lb 6.4 oz)   18 81.8 kg (180 lb 6.4 oz)   18 86.6 kg (191 lb)              Today, you had the following     No orders found for display         Today's Medication Changes          These changes are accurate as of 18 10:31 AM.  If you have any questions, ask your nurse or doctor.               Start taking these medicines.        Dose/Directions    rifaximin 550 MG Tabs tablet   Commonly known as:  XIFAXAN   Used for:  Hepatic encephalopathy (H) "   Started by:  Tamela Carballo MD        Dose:  550 mg   Take 1 tablet (550 mg) by mouth 2 times daily   Quantity:  60 tablet   Refills:  3            Where to get your medicines      These medications were sent to [facility] - [address]  [address], [facility] [address]     Phone:  [phone]     rifaximin 550 MG Tabs tablet                Primary Care Provider    [facility] MD       [address] [address]        Equal Access to Services     [facility]: Hadii aad ku hadasho Soomaali, waaxda luqadaha, qaybta kaalmada adeegyada, waxay vonin hayannia rico . So [facility] [phone].    ATENCIÓN: Si habla español, tiene a samaniego disposición servicios gratuitos de asistencia lingüística. [facility] [phone].    We comply with applicable federal civil rights laws and [place] laws. We do not discriminate on the basis of race, color, national origin, age, disability, sex, sexual orientation, or gender identity.            Thank you!     Thank you for choosing [facility] SOLID ORGAN TRANSPLANT  for your care. Our goal is always to provide you with excellent care. Hearing back from our patients is one way we can continue to improve our services. Please take a few minutes to complete the written survey that you may receive in the mail after your visit with us. Thank you!             Your Updated Medication List - Protect others around you: Learn how to safely use, store and throw away your medicines at www.disposemymeds.org.          This list is accurate as of 5/29/18 10:31 AM.  Always use your most recent med list.                   Brand Name Dispense Instructions for use Diagnosis    omeprazole 20 MG CR capsule    priLOSEC     Take 20 mg by mouth        rifaximin 550 MG Tabs tablet    XIFAXAN    60 tablet    Take 1 tablet (550 mg) by mouth 2 times daily    Hepatic encephalopathy (H)       traMADol  50 MG tablet    ULTRAM     Take 50 mg by mouth

## 2018-05-29 NOTE — PROGRESS NOTES
"Outpatient MNT: Liver Consult    Time Spent: 30 minutes  Visit Type: Initial  Referring Physician: Dr aCrballo   Pt accompanied by: his daughter and a      History of previous txp: none     Nutrition Assessment  Pt cooks for self and reports adding some salt when needed, however, cooks most foods from scratch and does not really cook with processed foods.     Appetite: very good     Vitamins, Supplements, Pertinent Meds: \"whatever they give me\"  Herbal Medicines/Supplements: pt reports starting a natural supplement (moringa) 3 weeks ago as an alternative to another medication that a doctor prescribed that was giving him GI pain and burning. He reports this supplement induces diarrhea and helps rejuvenate tissues in the body.     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 ?pedal edema    Adj/dosing BW: 180 lbs/82 kg       Malnutrition  % Intake: No decreased intake noted  % Weight Loss: None noted  Subcutaneous Fat Loss: None  Muscle Loss: None  Fluid Accumulation/Edema: unknown  Malnutrition Diagnosis: Patient does not meet two of the above criteria necessary for diagnosing malnutrition     Estimated Nutrition Needs  Energy  6263-1001     (25-30 kcal/kg for maintenance)     Protein  66-82    (0.8-1 g/kg for maintenance)           Fluid  1 ml/kcal or per MD        Micronutrient   Na+: <2000 mg/day            Nutrition Diagnosis  Food " and nutrition related knowledge deficit r/t no previous formal diet ed for liver disease AEB RD referral.     Nutrition Intervention  Nutrition education provided:  Discussed sodium intake (low sodium foods and drinks, seasoning food without salt and tips for low sodium diet). Overall pt doing well with lower sodium, but did discuss reducing added salt by a little bit.     Reviewed adequate protein intake. Encouraged receiving protein from both animal and plant based sources.     Did not review post transplant diet guidelines at this time d/t liver consult only. However, did review that the natural supplement he is taking will be off limits if he were to get a transplant. Explained why.     Patient Understanding: Pt verbalized understanding of education provided.  Expected Compliance: Good  Follow-Up Plans: PRN     Nutrition Goals  Limit Na+ <2000mg/day    Provided pt with contact info.   Kathrin Thacker RD, LD  Pgr [phone]

## 2018-05-29 NOTE — PROGRESS NOTES
Physician Attestation   I, Emil Echevarria, personally saw and evaluated Loy Limon as part of a shared visit.  I have reviewed and discussed with the advanced practice provider their assesment plan.      Key management decisions made by me: counselling about risk of liver transplantaijames Stein Yovanisonalimarcinlatha  Date of Service (when I saw the patient): 5/29/2018  Assessment and Plan:  1. liver transplant evaluation - patient is a good candidate overall. Benefits and surgical risks of a liver transplantation were discussed.  2.  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 Portal Vein:Patent  2. Hepatic Artery: Open  3. TIPS: absent  4. Previous Abdominal Surgery: Yes  5. Hepatocellular Carcinoma: Yes   6. Ascites: Present - minimal  7. Costal Angle: wide  8. Portopulmonary Hypertension: absent  9. Hepatopulmonary Syndrome: absent  10. Cardiac Evaluation: needs echocardiogram  11. Nutritional Status: Moderate  12. Diabetes: No  13.Hypertension no  14. Smoker:no  115: Fraility index:no      Recommendations: cardiology, chemo embolization. Needs to see a Urologist ; needs to meet sobriety guidelines      Patients overall evaluation will be discussed at the Liver Transplant selection committee meeting with a final recommendation on the patients suitability for transplant to be made at that time.    Consult Full  Details:  Loy Limon was seen in consultation at the request of Dr. Champagne for evaluation as a potential liver transplant recipient.    Reason for Visit:  Loy Limon is a 63 year old year old male with hepatocelluar carcinoma, who presents for liver transplant evaluation.    HPI:  Presenting complaint: Liver cancer    Past Medical History:   Diagnosis Date     Cirrhosis of liver (H) 5/8/2018     Enlarged prostate      Inguinal hernia      Liver lesion 5/8/2018     Past Surgical History:   Procedure Laterality Date     APPENDECTOMY        Past Surgical History:   Procedure Laterality Date     APPENDECTOMY       No family history on file.  No Known Allergies  Prior to Admission medications    Medication Sig Start Date End Date Taking? Authorizing Provider   omeprazole (PRILOSEC) 20 MG CR capsule Take 20 mg by mouth 4/4/18 4/4/19  Reported, Patient   traMADol (ULTRAM) 50 MG tablet Take 50 mg by mouth 3/8/18   Reported, Patient       Previous Transplant Hx: No    Cardiovascular Hx:       h/o Cardiac Issues: No       Exercise Tolerance: shortness of breath with exertion.    Potential Donor(s): Yes -  [facility]    ROS:    REVIEW OF SYSTEMS (check box if normal)  [x]                GENERAL  [x]                  PULMONARY []                 GENITOURINARY: difficulty in urination  [x]                 CNS                 [x]                  CARDIAC  [x]                  ENDOCRINE  [x]                 EARS,NOSE,THROAT [x]                  GASTROINTESTINAL [x]                  NEUROLOGIC    [x]                 MUSCLOSKELTAL  [x]                   HEMATOLOGY    Examination:     Vitals:  There were no vitals taken for this visit.    GENERAL APPEARANCE: alert and no distress  EYES: PERRL  HENT: mouth without ulcers or lesions  NECK: supple, no adenopathy  RESP: lungs clear to auscultation - no rales, rhonchi or wheezes  CV: regular rhythm, normal rate, no rub   ABDOMEN:  soft, nontender, no HSM or masses and bowel sounds normal  MS: extremities normal- no gross deformities noted, no evidence of inflammation in joints, no muscle tenderness  SKIN: no rash  NEURO: Normal strength and tone, sensory exam grossly normal, mentation intact and speech normal  PSYCH: mentation appears normal. and affect normal/bright      Results:   Recent Results (from the past 168 hour(s))   ABO/Rh type and screen    Collection Time: 05/29/18  7:53 AM   Result Value Ref Range    ABO PENDING     Antibody Screen PENDING     Test Valid Only At           [facility]    Specimen Expires 06/01/2018    Basic metabolic panel    Collection Time: 05/29/18  7:54 AM   Result Value Ref Range    Sodium 136 133 - 144 mmol/L    Potassium 4.6 3.4 - 5.3 mmol/L    Chloride 106 94 - 109 mmol/L    Carbon Dioxide 24 20 - 32 mmol/L    Anion Gap 6 3 - 14 mmol/L    Glucose 266 (H) 70 - 99 mg/dL    Urea Nitrogen 12 7 - 30 mg/dL    Creatinine 0.58 (L) 0.66 - 1.25 mg/dL    GFR Estimate >90 >60 mL/min/1.7m2    GFR Estimate If Black >90 >60 mL/min/1.7m2    Calcium 8.4 (L) 8.5 - 10.1 mg/dL   Hepatic panel    Collection Time: 05/29/18  7:54 AM   Result Value Ref Range    Bilirubin Direct 1.2 (H) 0.0 - 0.2 mg/dL    Bilirubin Total 2.2 (H) 0.2 - 1.3 mg/dL    Albumin 2.6 (L) 3.4 - 5.0 g/dL    Protein Total 6.9 6.8 - 8.8 g/dL    Alkaline Phosphatase 299 (H) 40 - 150 U/L    ALT 64 0 - 70 U/L    AST 79 (H) 0 - 45 U/L   INR    Collection Time: 05/29/18  7:54 AM   Result Value Ref Range    INR 1.53 (H) 0.86 - 1.14   CBC with platelets    Collection Time: 05/29/18  7:54 AM   Result Value Ref Range    WBC 3.0 (L) 4.0 - 11.0 10e9/L    RBC Count 3.80 (L) 4.4 - 5.9 10e12/L    Hemoglobin 12.7 (L) 13.3 - 17.7 g/dL    Hematocrit 37.1 (L) 40.0 - 53.0 %    MCV 98 78 - 100 fl    MCH 33.4 (H) 26.5 - 33.0 pg    MCHC 34.2 31.5 - 36.5 g/dL    RDW 13.8 10.0 - 15.0 %    Platelet Count 94 (L) 150 - 450 10e9/L     I had a long discussion with the patient regarding liver transplantation which included but was not limited to  the following points:    1. Liver transplant selection committee process.  2. The federal rules for cadaveric waiting list, the size and blood type matching of the organ. The availability of living-related donor transplantation.  3. The types of donors: brain death donors, non-heart beating donors, partial liver grafts: splits and living donor grafts  4. Extended criteria  Donors (older age, steasosis) and the increased  risk of primary non-function using  the extended criteria donors  5. The CDC high risk donors,  Risk of donor transmitted infections and donor transmitted malignancy  6. The liver transplant operation and the associated risks and technical complications which can include intraoperative death, post operative death,  Primary non-function, bleeding requiring re-operations, arterial and biliary complications, bowel perforations, and intra abdominal abscess. Some of these complicaitons may require a second operation.  7. The postoperative course, the ICU stay and risk of postoperative complications which can include sepsis, MI, stroke, brain injury, pneumonia, pleural effusions, and renal dysfunction.  8. The current 1 year and 5 year graft and patient survivals.  9. The need for life long immunosuppressive therapy and the side effects of these medications, including the possibility of toxicity, opportunistic infections, risk of cancer including lymphoma, and the possibility of rejection even if the patient is taking the medication exactly as prescribed.  10. The need for compliance with medications and follow-up visits in the clinic and thereafter.  11. The patient and family understand these risks and wish to proceed to transplantation       I spent 60 minutes with the patient and more than 50% of the time was spend in direct face to face counseling.  CC  [facility], ONCOLOGY    Copy to patient     [address]  [facility] [facility] [id number]        HPI      ROS      Physical Exam

## 2018-05-29 NOTE — LETTER
5/29/2018       RE: Loy Limon  [address]  [place] [address]     Dear Colleague,    Thank you for referring your patient, Loy Limon, to the [facility] HEPATOLOGY at [facility]. Please see a copy of my visit note below.    [facility]    Hepatology New Patient Visit    Referring provider: Dr. David Champagne    63 year old male    Chief complaint: transplant eval, EtOH cirrhosis, HCC    HPI:  Cirrhosis:  Etiology: EtOH  EV: yes-grade II 4/2018-not banded, on nadolol  HE: ? Minimal HE, did not tolerate lactulose (abdominal pain)  Ascites: none  SBP: n/a  PHS: ?  PPH: ?  PV: patent  HCC: yes-see below    Mr. Limon is a 63 year old male with relevant past medical history of alcohol abuse/dependence who was diagnosed with liver disease after being seen in primary care clinic in 3/2018 for R sided abdominal pain when a CT scan showed cirrhosis, findings of portal hypertension and lesions concerning for HCC.   He states he was aware he had liver disease in March, although he may have been told to stop drinking earlier. He did lose his brother to complications from alcoholic liver disease a year ago and was asked to stop drinking by family at that time. He started drinking around 30 years ago and was drinking ~30 beers weekly. He has not had any alcohol, by his own report, since March 2018.   He subsequently saw hepatology who felt he had alcoholic cirrhosis and then confirmed the presence of HCC with MRI. He had viral hepatitis testing that showed immunity against hepatitis A and effective vaccination against hepatitis B and wild type HFE. Given the nature of his HCC, he was referred to the [facility] for transplant eval.  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  He has no  "ascites and is not on diuretics. He has no history of SBP. No history of melena, hematemesis or hematochezia. Patient denies fevers, sweats, chills or weight loss.    Medical hx Surgical hx   Past Medical History:   Diagnosis Date     Cirrhosis of liver (H) 2018     Enlarged prostate      Inguinal hernia      Liver lesion 2018      Past Surgical History:   Procedure Laterality Date     APPENDECTOMY     Appendectomy 25+ years ago with subsequent pelvic abscess requiring drain     Medications  Prior to Admission medications    Medication Sig Start Date End Date Taking? Authorizing Provider   omeprazole (PRILOSEC) 20 MG CR capsule Take 20 mg by mouth 18 Yes Reported, Patient   traMADol (ULTRAM) 50 MG tablet Take 50 mg by mouth 3/8/18  Yes Reported, Patient       Allergies  No Known Allergies    Family hx Social hx   Brother who  of alcohol-related liver disease last year  No other major medical issues in family   Social History   Substance Use Topics     Smoking status: Former Smoker     Types: Cigarettes, Cigars     Smokeless tobacco: Never Used     Alcohol use Yes      Comment: Quit drinking 2018     Smoked 1/3 ppd cigarettes and additional cigars. Quit in March    Drank \"heavily\" for the past 25 years with frequent binge drinking     Quit drinking late 2018    Working (reduced hours now) as  for high [employer]     , 3 kids, multiple family members in town     Review of systems  A 10-point review of systems was negative.    Examination  /69 (Patient Position: Sitting)  Pulse 71  Temp 98.3  F (36.8  C) (Oral)  Resp 18  Ht 1.721 m (5' 7.75\")  Wt 81.8 kg (180 lb 6.4 oz)  SpO2 95%  BMI 27.63 kg/m2  Body mass index is 27.63 kg/(m^2).    Gen- well, NAD, A+Ox3, normal color  Eye- EOMI  ENT- MMM, normal oropharynx  Lym- no palpable lymphadenopathy  CVS- S1, S2 normal, no added sounds, RRR  RS- CTA  Abd- RLQ scar with extension below umbilicus, R " inguinal hernia, reducible  Extr- pulses good, no OSKAR  MS- hands normal- no clubbing, R shoulder with limited ROM  Neuro- A+Ox3, mild bilateral asterixis  Skin- no rash or jaundice  Psych- normal mood    Laboratory  Lab Results   Component Value Date     05/29/2018    POTASSIUM 4.6 05/29/2018    CHLORIDE 106 05/29/2018    CO2 24 05/29/2018    BUN 12 05/29/2018    CR 0.58 05/29/2018       Lab Results   Component Value Date    BILITOTAL 2.2 05/29/2018    ALT 64 05/29/2018    AST 79 05/29/2018    ALKPHOS 299 05/29/2018       Lab Results   Component Value Date    ALBUMIN 2.6 05/29/2018    PROTTOTAL 6.9 05/29/2018        Lab Results   Component Value Date    WBC 3.0 05/29/2018    HGB 12.7 05/29/2018    MCV 98 05/29/2018    PLT 94 05/29/2018       Lab Results   Component Value Date    INR 1.53 05/29/2018     MELD-Na score: 15 at 5/29/2018  7:54 AM  MELD score: 14 at 5/29/2018  7:54 AM  Calculated from:  Serum Creatinine: 0.58 mg/dL (Rounded to 1) at 5/29/2018  7:54 AM  Serum Sodium: 136 mmol/L at 5/29/2018  7:54 AM  Total Bilirubin: 2.2 mg/dL at 5/29/2018  7:54 AM  INR(ratio): 1.53 at 5/29/2018  7:54 AM  Age: 63 years      Radiology reviewed     Assessment  63 year old male with past medical history of alcohol abuse/dependence and resultant cirrhosis with minimal HE and HCC who presents for evaluation for liver transplant.    Plan  -Oncology referral  -Start rifaximin given asterixis and inability to tolerate lactulose  -Viral hepatitis serologies  -Urine ethyl glucuronide    -IR referral for liver directed therapy--suspect he would be a good candidate for lobar Y90 as his highest risk lesions appear to be concentrated in the R lobe and is multifocal   -SW/Surg consults today, suspect he will need CD eval  -Hold on full eval as question as to whether patient is within tremaine and not meeting minimum sobriety criteria.   -RTC 3 months with Dr. Carballo--re-evaluate imaging and sobriety at that time.     Patient seen and  evaluated with Dr. Haris Mondragon  GI fellow    Attestation:  This patient has been seen and evaluated by me, Tamela Carballo.  Discussed with the house staff team or resident(s) and agree with the findings and plan in this note.       Again, thank you for allowing me to participate in the care of your patient.      Sincerely,    Tamela Carballo MD

## 2018-05-29 NOTE — TELEPHONE ENCOUNTER
Prior Authorization Specialty Medication Request    Medication/Dose: Xifaxan  ICD code (if different than what is on RX):  K72.90 hepatic encephalopathy   Previously Tried and Failed:  Lactulose, lactulose caused excessive diarrhea     Important Lab Values: total bili 2.2, bili direct 1.2, albumin 2.6 (see EPIC results)  Rationale:     Insurance Name:   Insurance ID:   Insurance Phone Number:     Pharmacy Information (if different than what is on RX)  Name:    Phone:

## 2018-05-29 NOTE — PROGRESS NOTES
[facility]    Hepatology New Patient Visit    Referring provider: Dr. David Champagne    63 year old male    Chief complaint: transplant eval, EtOH cirrhosis, HCC    HPI:  Cirrhosis:  Etiology: EtOH  EV: yes-grade II 4/2018-not banded, on nadolol  HE: ? Minimal HE, did not tolerate lactulose (abdominal pain)  Ascites: none  SBP: n/a  PHS: ?  PPH: ?  PV: patent  HCC: yes-see below    Mr. Limon is a 63 year old male with relevant past medical history of alcohol abuse/dependence who was diagnosed with liver disease after being seen in primary care clinic in 3/2018 for R sided abdominal pain when a CT scan showed cirrhosis, findings of portal hypertension and lesions concerning for HCC.   He states he was aware he had liver disease in March, although he may have been told to stop drinking earlier. He did lose his brother to complications from alcoholic liver disease a year ago and was asked to stop drinking by family at that time. He started drinking around 30 years ago and was drinking ~30 beers weekly. He has not had any alcohol, by his own report, since March 2018.   He subsequently saw hepatology who felt he had alcoholic cirrhosis and then confirmed the presence of HCC with MRI. He had viral hepatitis testing that showed immunity against hepatitis A and effective vaccination against hepatitis B and wild type HFE. Given the nature of his HCC, he was referred to the [facility] for transplant eval.  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  He has no ascites and is not on diuretics. He has no history of SBP. No history of melena, hematemesis or hematochezia. Patient denies fevers, sweats, chills or weight loss.    Medical hx Surgical hx   Past Medical History:   Diagnosis Date     Cirrhosis of liver (H) 5/8/2018     Enlarged  "prostate      Inguinal hernia      Liver lesion 2018      Past Surgical History:   Procedure Laterality Date     APPENDECTOMY     Appendectomy 25+ years ago with subsequent pelvic abscess requiring drain     Medications  Prior to Admission medications    Medication Sig Start Date End Date Taking? Authorizing Provider   omeprazole (PRILOSEC) 20 MG CR capsule Take 20 mg by mouth 18 Yes Reported, Patient   traMADol (ULTRAM) 50 MG tablet Take 50 mg by mouth 3/8/18  Yes Reported, Patient       Allergies  No Known Allergies    Family hx Social hx   Brother who  of alcohol-related liver disease last year  No other major medical issues in family   Social History   Substance Use Topics     Smoking status: Former Smoker     Types: Cigarettes, Cigars     Smokeless tobacco: Never Used     Alcohol use Yes      Comment: Quit drinking 2018     Smoked 1/3 ppd cigarettes and additional cigars. Quit in March    Drank \"heavily\" for the past 25 years with frequent binge drinking     Quit drinking late 2018    Working (reduced hours now) as  for [employer]     , 3 kids, multiple family members in town     Review of systems  A 10-point review of systems was negative.    Examination  /69 (Patient Position: Sitting)  Pulse 71  Temp 98.3  F (36.8  C) (Oral)  Resp 18  Ht 1.721 m (5' 7.75\")  Wt 81.8 kg (180 lb 6.4 oz)  SpO2 95%  BMI 27.63 kg/m2  Body mass index is 27.63 kg/(m^2).    Gen- well, NAD, A+Ox3, normal color  Eye- EOMI  ENT- MMM, normal oropharynx  Lym- no palpable lymphadenopathy  CVS- S1, S2 normal, no added sounds, RRR  RS- CTA  Abd- RLQ scar with extension below umbilicus, R inguinal hernia, reducible  Extr- pulses good, no OSKAR  MS- hands normal- no clubbing, R shoulder with limited ROM  Neuro- A+Ox3, mild bilateral asterixis  Skin- no rash or jaundice  Psych- normal mood    Laboratory  Lab Results   Component Value Date     2018    " POTASSIUM 4.6 05/29/2018    CHLORIDE 106 05/29/2018    CO2 24 05/29/2018    BUN 12 05/29/2018    CR 0.58 05/29/2018       Lab Results   Component Value Date    BILITOTAL 2.2 05/29/2018    ALT 64 05/29/2018    AST 79 05/29/2018    ALKPHOS 299 05/29/2018       Lab Results   Component Value Date    ALBUMIN 2.6 05/29/2018    PROTTOTAL 6.9 05/29/2018        Lab Results   Component Value Date    WBC 3.0 05/29/2018    HGB 12.7 05/29/2018    MCV 98 05/29/2018    PLT 94 05/29/2018       Lab Results   Component Value Date    INR 1.53 05/29/2018     MELD-Na score: 15 at 5/29/2018  7:54 AM  MELD score: 14 at 5/29/2018  7:54 AM  Calculated from:  Serum Creatinine: 0.58 mg/dL (Rounded to 1) at 5/29/2018  7:54 AM  Serum Sodium: 136 mmol/L at 5/29/2018  7:54 AM  Total Bilirubin: 2.2 mg/dL at 5/29/2018  7:54 AM  INR(ratio): 1.53 at 5/29/2018  7:54 AM  Age: 63 years      Radiology reviewed     Assessment  63 year old male with past medical history of alcohol abuse/dependence and resultant cirrhosis with minimal HE and HCC who presents for evaluation for liver transplant.    Plan  -Oncology referral  -Start rifaximin given asterixis and inability to tolerate lactulose  -Viral hepatitis serologies  -Urine ethyl glucuronide    -IR referral for liver directed therapy--suspect he would be a good candidate for lobar Y90 as his highest risk lesions appear to be concentrated in the R lobe and is multifocal   -SW/Surg consults today, suspect he will need CD eval  -Hold on full eval as question as to whether patient is within tremaine and not meeting minimum sobriety criteria.   -RTC 3 months with Dr. Carballo--re-evaluate imaging and sobriety at that time.     Patient seen and evaluated with Dr. Haris Mondragon  GI fellow    Attestation:  This patient has been seen and evaluated by me, Tamela Carballo.  Discussed with the house staff team or resident(s) and agree with the findings and plan in this note.

## 2018-05-29 NOTE — MR AVS SNAPSHOT
After Visit Summary   5/29/2018    Loy Limon    MRN: [id number]           Patient Information     Date Of Birth          1955        Visit Information        Provider Department      5/29/2018 9:45 AM Víctor Smith Oca; Peyton Barrios, MSW  Health [facility] Organ Transplant        Today's Diagnoses     Awaiting organ transplant status    -  1       Follow-ups after your visit        Your next 10 appointments already scheduled     Jun 13, 2018  8:30 AM CDT   Procedure 1.5 hr with U2A ROOM 3   [address] [facility] ([facility])    [address] [address]               Jun 13, 2018 10:00 AM CDT   IR CHEMO EMBOLIZATION with UUIR4   [facility], [place], Interventional Radiology ([facility])    [address] [id number] [phone]           1. Your doctor will need to do a history and physical within 30 days before this procedure. 2. Your doctor will determine whether you need a 12 lead EKG. 3. Laboratory tests are to be obtained by your doctor prior to the exam (creatinine, hepatic panel, Hgb/Hct, platelet count, and INR) 4. Bring a list of all drugs you are taking; include supplements and over-the-counter medications. 5. Wear comfortable clothes and leave all valuables at home. 6. If you have allergies to x-ray contrast or iodine, contact your doctor or a Radiology nurse prior to the exam day for instructions. 7. If you are or may be pregnant, contact your doctor or a Radiology nurse prior to the day of the exam. 8. If you have diabetes, check with your doctor or a Radiology nurse to see if your insulin needs to be adjusted for the exam. 9. If you are taking Coumadin (to thin your blood) please contact your doctor or a Radiology nurse at least 5 days before the exam for special instructions. 10. You should not have received contrast within 48 hours of this exam.  11. The day before your exam you may eat your regular diet and are encouraged to drink at least 2 quarts of clear liquids. Drink no alcoholic beverages for 24 hours prior to the exam. 12. If you have a colostomy you will need to irrigate it with tap water at 8PM the evening before and again at 6AM the morning of the exam. 13. Do not smoke for 24 hours prior to the procedure. 14. Do not eat any solid food or milk products for 6 hours prior to the exam. You may drink clear liquids until 2 hours prior to the exam. Clear liquids include the following: water, Jell-O, clear broth, apple juice or any noncarbonated drink that you can see through (no pop!) 15. The morning of the exam you may brush your teeth and take medications as directed with a sip of water. 16. Tell the Radiology nurse if you have any allergies. 17. You will be asked to empty your bladder before the exam begins. 18. Following the exam you will need to remain on complete bedrest for 4-6 hours. The nurse will monitor your vital signs, puncture site, and the pulses and temperature of the leg that was punctured.            Jul 11, 2018 11:00 AM CDT   MR ABDOMEN W/O & W CONTRAST with UUMR1   [facility], [place], [facility] ([facility])    [address] [address] [phone]           Take your medicines as usual, unless your doctor tells you not to. Bring a list of your current medicines to your exam (including vitamins, minerals and over-the-counter drugs). Also bring the results of similar scans you may have had.    You may or may not receive IV contrast for this exam pending the discretion of the Radiologist.   Do not eat or drink for 6 hours prior to exam.  The MRI machine uses a strong magnet. Please wear clothes without metal (snaps, zippers). A sweatsuit works well, or we may give you a hospital gown.  Please remove any body piercings and hair extensions before you arrive. You will also  remove watches, jewelry, hairpins, wallets, dentures, partial dental plates and hearing aids. You may wear contact lenses, and you may be able to wear your rings. We have a safe place to keep your personal items, but it is safer to leave them at home.  **IMPORTANT** THE INSTRUCTIONS BELOW ARE ONLY FOR THOSE PATIENTS WHO HAVE BEEN PRESCRIBED SEDATION OR GENERAL ANESTHESIA DURING THEIR MRI PROCEDURE:  IF YOUR DOCTOR PRESCRIBED ORAL SEDATION (take medicine to help you relax during your exam):   You must get the medicine from your doctor (oral medication) before you arrive. Bring the medicine to the exam. Do not take it at home. You ll be told when to take it upon arriving for your exam.   Arrive one hour early. Bring someone who can take you home after the test. Your medicine will make you sleepy. After the exam, you may not drive, take a bus or take a taxi by yourself.  IF YOUR DOCTOR PRESCRIBED IV SEDATION:   Arrive one hour early. Bring someone who can take you home after the test. Your medicine will make you sleepy. After the exam, you may not drive, take a bus or take a taxi by yourself.   No eating 6 hours before your exam. You may have clear liquids up until 4 hours before your exam. (Clear liquids include water, clear tea, black coffee and fruit juice without pulp.)  IF YOUR DOCTOR PRESCRIBED ANESTHESIA (be asleep for your exam):   Arrive 1 1/2 hours early. Bring someone who can take you home after the test. You may not drive, take a bus or take a taxi by yourself.   No eating 8 hours before your exam. You may have clear liquids up until 4 hours before your exam. (Clear liquids include water, clear tea, black coffee and fruit juice without pulp.)   You will spend four to five hours in the recovery room.  If you have any questions, please contact your Imaging Department exam site.            Jul 11, 2018  1:00 PM CDT   LAB with [facility] ([facility]    [address] [place] [address]  [address]  [address] [place] [id number] [phone]           Please do not eat 10-12 hours before your appointment if you are coming in fasting for labs on lipids, cholesterol, or glucose (sugar). This does not apply to pregnant women. Water, hot tea and black coffee (with nothing added) are okay. Do not drink other fluids, diet soda or chew gum.            Jul 13, 2018  9:00 AM CDT   (Arrive by 8:45 AM)   Return Visit with Debora Ac MD   [facility] ([facility])    [address]  [address]  [facility] [address] [phone]            Oct 03, 2018  8:30 AM CDT   MR ABDOMEN W/O & W CONTRAST with [address] MRI ([facility])    [address] [id number] [phone]           Take your medicines as usual, unless your doctor tells you not to. Bring a list of your current medicines to your exam (including vitamins, minerals and over-the-counter drugs). Also bring the results of similar scans you may have had.    You may or may not receive IV contrast for this exam pending the discretion of the Radiologist.   Do not eat or drink for 6 hours prior to exam.  The MRI machine uses a strong magnet. Please wear clothes without metal (snaps, zippers). A sweatsuit works well, or we may give you a hospital gown.  Please remove any body piercings and hair extensions before you arrive. You will also remove watches, jewelry, hairpins, wallets, dentures, partial dental plates and hearing aids. You may wear contact lenses, and you may be able to wear your rings. We have a safe place to keep your personal items, but it is safer to leave them at home.  **IMPORTANT** THE INSTRUCTIONS BELOW ARE ONLY FOR THOSE PATIENTS WHO HAVE BEEN PRESCRIBED SEDATION OR GENERAL ANESTHESIA DURING THEIR MRI PROCEDURE:  IF YOUR DOCTOR PRESCRIBED ORAL SEDATION (take medicine to help you relax during your exam):    You must get the medicine from your doctor (oral medication) before you arrive. Bring the medicine to the exam. Do not take it at home. You ll be told when to take it upon arriving for your exam.   Arrive one hour early. Bring someone who can take you home after the test. Your medicine will make you sleepy. After the exam, you may not drive, take a bus or take a taxi by yourself.  IF YOUR DOCTOR PRESCRIBED IV SEDATION:   Arrive one hour early. Bring someone who can take you home after the test. Your medicine will make you sleepy. After the exam, you may not drive, take a bus or take a taxi by yourself.   No eating 6 hours before your exam. You may have clear liquids up until 4 hours before your exam. (Clear liquids include water, clear tea, black coffee and fruit juice without pulp.)  IF YOUR DOCTOR PRESCRIBED ANESTHESIA (be asleep for your exam):   Arrive 1 1/2 hours early. Bring someone who can take you home after the test. You may not drive, take a bus or take a taxi by yourself.   No eating 8 hours before your exam. You may have clear liquids up until 4 hours before your exam. (Clear liquids include water, clear tea, black coffee and fruit juice without pulp.)   You will spend four to five hours in the recovery room.  If you have any questions, please contact your Imaging Department exam site.            Oct 03, 2018  9:20 AM CDT   CT CHEST W/O CONTRAST with UCCT1   [facility] ([facility])    [address] [address] [phone]           Please bring any scans or X-rays taken at other hospitals, if similar tests were done. Also bring a list of your medicines, including vitamins, minerals and over-the-counter drugs. It is safest to leave personal items at home.  Be sure to tell your doctor:   If you have any allergies.   If there s any chance you are pregnant.   If you are breastfeeding.  You do not need to do anything special to  prepare for this exam.  Please wear loose clothing, such as a sweat suit or jogging clothes. Avoid snaps, zippers and other metal. We may ask you to undress and put on a hospital gown.            Oct 03, 2018  9:45 AM CDT   Lab with  LAB   [facility] ([facility])    [address]  1st Floor  [facility] [id number] [phone]            Oct 04, 2018  4:00 PM CDT   (Arrive by 3:45 PM)   Return Visit with Hi Melgar MD   [facility] ([facility])    [address]  [address]  [facility] [id number] [phone]              Future tests that were ordered for you today     Open Future Orders        Priority Expected Expires Ordered    M Tuberculosis by Quantiferon Routine  12/3/2018 6/6/2018    Prostate spec antigen screen Routine  12/3/2018 6/6/2018    TSH with free T4 reflex Routine  12/3/2018 6/6/2018    Vitamin D Deficiency Routine  12/3/2018 6/6/2018    Transferrin Routine  12/3/2018 6/6/2018    INR Routine  12/3/2018 6/6/2018    CBC with platelets Routine  12/3/2018 6/6/2018    UA reflex to Microscopic and Culture Routine  12/6/2018 6/6/2018    Ethyl Glucuronide Urine Routine  12/6/2018 6/6/2018    Protein  random urine with Creat Ratio Routine  12/3/2018 6/6/2018    CMV Antibody IgG Routine  12/3/2018 6/6/2018    EBV Capsid Antibody IgG Routine  12/3/2018 6/6/2018    HIV Antigen Antibody Combo Pretransplant Routine  12/6/2018 6/6/2018    Treponema Abs w Reflex to RPR and Titer Routine  12/3/2018 6/6/2018    X-ray Chest 2 vws [IMG36] Routine 6/6/2018 6/6/2019 6/6/2018    US Abdomen Complete w Doppler Complete Routine  6/6/2019 6/6/2018    Dexa hip/pelvis/spine [LWU2564] Routine  6/6/2019 6/6/2018    EKG 12-lead, tracing only [EKG1] Routine 6/6/2018 6/6/2019 6/6/2018    Echo dobutamine stress test Routine  6/6/2019 6/6/2018    Pulse oximetry nursing Routine  12/3/2018 6/6/2018    PFT Lab Testing Routine  12/3/2018  "2018    ABO/Rh type and screen Routine  12/3/2018 2018    Iron and iron binding capacity Routine  12/3/2018 2018            Who to contact     If you have questions or need follow up information about today's clinic visit or your schedule please contact [facility] directly at [phone].  Normal or non-critical lab and imaging results will be communicated to you by [employer]Vote[employer], letter or phone within 4 business days after the clinic has received the results. If you do not hear from us within 7 days, please contact the clinic through [employer]ote[employer] or phone. If you have a critical or abnormal lab result, we will notify you by phone as soon as possible.  Submit refill requests through [email or web address] or call your pharmacy and they will forward the refill request to us. Please allow 3 business days for your refill to be completed.          Additional Information About Your Visit        [email or web address] Information     [email or web address] lets you send messages to your doctor, view your test results, renew your prescriptions, schedule appointments and more. To sign up, go to [email or web address] . Click on \"Log in\" on the left side of the screen, which will take you to the Welcome page. Then click on \"Sign up Now\" on the right side of the page.     You will be asked to enter the access code listed below, as well as some personal information. Please follow the directions to create your username and password.     Your access code is: [email or web address]  Expires: 2018  8:03 AM     Your access code will  in 90 days. If you need help or a new code, please call your [facility] clinic or [phone].        Care EveryWhere ID     This is your Care EveryWhere ID. This could be used by other organizations to access your [facility] medical records  [id number]         Blood Pressure from Last 3 Encounters:   18 145/72   18 168/75   18 138/69    Weight from Last 3 Encounters:   18 82.3 kg (181 lb 8 oz) "   05/31/18 81.6 kg (180 lb)   05/29/18 81.8 kg (180 lb 6.4 oz)              Today, you had the following     No orders found for display         Today's Medication Changes          These changes are accurate as of 5/29/18 11:59 PM.  If you have any questions, ask your nurse or doctor.               Start taking these medicines.        Dose/Directions    rifaximin 550 MG Tabs tablet   Commonly known as:  XIFAXAN   Used for:  Hepatic encephalopathy (H)   Started by:  Tamela Carballo MD        Dose:  550 mg   Take 1 tablet (550 mg) by mouth 2 times daily   Quantity:  60 tablet   Refills:  3            Where to get your medicines      [place] [address]  [address] [address]     Phone:  [phone]     rifaximin 550 MG Tabs tablet                Primary Care Provider    [facility]       [address] [address]        Equal Access to Services     [facility]: Hadii aad ku hadasho Soomaali, waaxda luqadaha, qaybta kaalmada adeegyada, waxay idiin hayannia rico . So [facility] [phone].    ATENCIÓN: Si habla español, tiene a samaniego disposición servicios gratuitos de asistencia lingüística. Llame al [phone].    We comply with applicable federal civil rights laws and [place] laws. We do not discriminate on the basis of race, color, national origin, age, disability, sex, sexual orientation, or gender identity.            Thank you!     Thank you for choosing [facility]  for your care. Our goal is always to provide you with excellent care. Hearing back from our patients is one way we can continue to improve our services. Please take a few minutes to complete the written survey that you may receive in the mail after your visit with us. Thank you!             Your Updated Medication List - Protect others around you: Learn how to  safely use, store and throw away your medicines at www.disposemymeds.org.          This list is accurate as of 5/29/18 11:59 PM.  Always use your most recent med list.                   Brand Name Dispense Instructions for use Diagnosis    omeprazole 20 MG CR capsule    priLOSEC     Take 20 mg by mouth        rifaximin 550 MG Tabs tablet    XIFAXAN    60 tablet    Take 1 tablet (550 mg) by mouth 2 times daily    Hepatic encephalopathy (H)       traMADol 50 MG tablet    ULTRAM     Take 50 mg by mouth

## 2018-05-30 NOTE — TELEPHONE ENCOUNTER
Central Prior Authorization Team   Phone: [phone]      Prior Authorization Not Needed     Medication: Xifaxan 550mg-PA not needed  Insurance Company:    Expected CoPay:      Pharmacy Filling the Rx: [facility] [place] [facility] [place], [place] - [address]  Pharmacy Notified: Yes  Patient Notified: No    Pharmacy will call patient if he has part D pharmacy benefits and process through as PA team does not process PA through part B. If patient does not have part D, Medicare Part B is normally handled by the pharmacy and patient.

## 2018-05-30 NOTE — TELEPHONE ENCOUNTER
Email sent 5/29 at 10:33a to [email or web address]    Kaylene:    Can you bring your father for an interventional radiology consult this Friday?    They can get him in this Friday 6/1 @ 915am with Dominique.    Sincerely,    Frances Vasquez  Transplant   Phone [phone]  Fax [phone]  [email or web address]

## 2018-05-30 NOTE — TELEPHONE ENCOUNTER
May 30, 2018: I spoke with Kaylene (who spoke with her father on the other line). She and/or her father agreed to attend these appts:    5/31 - Thursday at 4:00p - Dr. Melgar    6/1 - Friday at 9:15a - Dr. Carin Vasquez  Pre-Liver Transplant   [phone]

## 2018-05-31 ENCOUNTER — ONCOLOGY VISIT (OUTPATIENT)
Dept: ONCOLOGY | Facility: CLINIC | Age: 63
End: 2018-05-31
Attending: INTERNAL MEDICINE
Payer: MEDICARE

## 2018-05-31 VITALS
HEIGHT: 68 IN | TEMPERATURE: 98.1 F | RESPIRATION RATE: 16 BRPM | DIASTOLIC BLOOD PRESSURE: 75 MMHG | WEIGHT: 180 LBS | HEART RATE: 59 BPM | OXYGEN SATURATION: 98 % | BODY MASS INDEX: 27.28 KG/M2 | SYSTOLIC BLOOD PRESSURE: 168 MMHG

## 2018-05-31 DIAGNOSIS — K74.60 CIRRHOSIS (H): ICD-10-CM

## 2018-05-31 DIAGNOSIS — R91.8 PULMONARY NODULES: Primary | ICD-10-CM

## 2018-05-31 LAB — ETHYL GLUCURONIDE UR QL: NEGATIVE

## 2018-05-31 PROCEDURE — G0463 HOSPITAL OUTPT CLINIC VISIT: HCPCS | Mod: ZF

## 2018-05-31 PROCEDURE — 99205 OFFICE O/P NEW HI 60 MIN: CPT | Mod: ZP | Performed by: INTERNAL MEDICINE

## 2018-05-31 PROCEDURE — T1013 SIGN LANG/ORAL INTERPRETER: HCPCS | Mod: U3,ZF

## 2018-05-31 RX ORDER — PROPRANOLOL HYDROCHLORIDE 10 MG/1
10 TABLET ORAL
COMMUNITY
Start: 2018-04-24 | End: 2018-07-18

## 2018-05-31 ASSESSMENT — PAIN SCALES - GENERAL: PAINLEVEL: NO PAIN (0)

## 2018-05-31 NOTE — LETTER
5/31/2018       RE: Loy Limon  [address]  [facility] [address]     Dear Colleague,    Thank you for referring your patient, Loy Limon, to the [facility]. Please see a copy of my visit note below.    Oncology initial visit:  Date on this visit: 5/31/2018    Loy Limon  is referred by Dr.Mohamed Wander Herr for an oncology consultation. He requires evaluation for new diagnosis of     Primary Physician: [facility], Oncology     History Of Present Illness:  A professional Ghanaian interpretor is available throughout the interview.  Mr. Lmion is a 63 year old male who has been a chronic heavy alcohol drinker presented with right abdominal pain in March 2018 and workup including a CT scan showed cirrhosis, portal hypertension and 2 hepatic lesions in the right hepatic lobe which were concerning for hepatocellular carcinoma.  He had MRI on 3/16/2018 which confirmed cirrhosis and portal hypertension and splenomegaly. 3.7 cm segment 8 lesion was OPTN 5B.  There was an antecedent 0.9 cm satellite nodule in hepatic segment 8 consistent with LI-RADS 4.  In segment 7, 1.5 cm LIRADS 4 lesion was seen  Another 1.9 cm segment 7 lesion was seen which was indeterminate.  Several other LIRADS 3 lesions were scattered.  Alpha-fetoprotein was not elevated.  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 Hepatitis B surface antibody was reactive consistent with effective vaccination. Hepatitis C antibody was nonreactive.  He has had no ascites. He has had mild hepatic and Neuropathy but was unable to tolerate lactulose because of abdominal discomfort. He has no history of variceal bleeding although he has grade 2 esophageal varices which have not been banded and he continues to be on propranolol.    He had repeat MRI done on 5/24/2018 which showed stable to slight increase in size of the segment 8 lesion.  Segment 7  lesion was consistent with LIRADS 4 lesion  Another segment 7 lesion is also consistent with LIRADS 4 lesion  CT of the chest showed a 4 mm solitary pulmonary nodule in the right lower lobe which remains indeterminate. There was no other evidence of metastasis.  Bone scan was negative for any evidence of metastatic disease.     He comes in today and tells me that overall his abdominal pain is under decent control. He also has what looks like an inguinal hernia which is reducible. He feels tired but still independent and does usual stuff. No weight loss. Denies nausea vomiting. Denies diarrhea or constipation. No bleeding. No new swellings apart from occasional mild lower extremity swelling. He has had issues with concentration a couple of times but not very recently. He was given a rifaximin by Dr. Carballo as he was unable to tolerate lactulose but he has not started that because it had a high co-pay and he is waiting to get it covered through his insurance. He denies any infections. Occasionally he feels mild itching of the skin. No neuropathy. No difficulty breathing.    ECOG 1    ROS:  A comprehensive ROS was otherwise neg        Past Medical/Surgical History:  Past Medical History:   Diagnosis Date     Cirrhosis of liver (H) 5/8/2018     Enlarged prostate      Inguinal hernia      Liver lesion 5/8/2018     Past Surgical History:   Procedure Laterality Date     APPENDECTOMY       Cancer History:   As above    Allergies:  Allergies as of 05/31/2018     (No Known Allergies)     Current Medications:  Current Outpatient Prescriptions   Medication Sig Dispense Refill     omeprazole (PRILOSEC) 20 MG CR capsule Take 20 mg by mouth       propranolol (INDERAL) 10 MG tablet Take 10 mg by mouth       traMADol (ULTRAM) 50 MG tablet Take 50 mg by mouth       rifaximin (XIFAXAN) 550 MG TABS tablet Take 1 tablet (550 mg) by mouth 2 times daily (Patient not taking: Reported on 5/31/2018) 60 tablet 3      Family History:  No  "family history on file.   Maternal grand mother had Uterus cancer at 63  He has 3 living children. One son with schizophrenia  One daughter dies of leukemia at 8 years of age  Son  shortly after birth. He had some brain congenital anomaly    Social History:  Social History     Social History     Marital status:      Spouse name: N/A     Number of children: N/A     Years of education: N/A     Occupational History     Not on file.     Social History Main Topics     Smoking status: Former Smoker     Types: Cigarettes, Cigars     Smokeless tobacco: Never Used     Alcohol use Yes      Comment: Quit drinking 2018     Drug use: No     Sexual activity: Not on file     Other Topics Concern     Not on file     Social History Narrative   smoker for 25 years. One pack for 1 week. Quit in 2018.  Has been a heavy drinker for 30 years, beer about 36 every week. Last drink was around 2018.  No drug use.  Lives with wife. 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  Physical Exam:  /75  Pulse 59  Temp 98.1  F (36.7  C) (Oral)  Resp 16  Ht 1.721 m (5' 7.75\")  Wt 81.6 kg (180 lb)  SpO2 98%  BMI 27.57 kg/m2  CONSTITUTIONAL: no acute distress  EYES: PERRLA, no palor but there is mild icterus.   ENT/MOUTH: no mouth lesions. Ears normal  CVS: s1s2 no m r g .   RESPIRATORY: clear to auscultation b/l  GI: soft non tender. I can feel the liver edge about 3-4 fingerbreadths below costal margin. I cannot feel the spleen  NEURO: AAOX3  Grossly non focal neuro exam. He has mild asterixis  INTEGUMENT: no obvious rashes  LYMPHATIC: no palpable cervical, supraclavicular, axillary or inguinal LAD  MUSCULOSKELETAL: Unremarkable. No bony tenderness.   EXTREMITIES: Trace edema  PSYCH: Mentation, mood and affect are normal. Decision making capacity is intact    Laboratory/Imaging Studies    Results for DONNA BIGGS (MRN [id number]) as of 2018 16:06   Ref. Range 2018 07:54   Sodium " Latest Ref Range: 133 - 144 mmol/L 136   Potassium Latest Ref Range: 3.4 - 5.3 mmol/L 4.6   Chloride Latest Ref Range: 94 - 109 mmol/L 106   Carbon Dioxide Latest Ref Range: 20 - 32 mmol/L 24   Urea Nitrogen Latest Ref Range: 7 - 30 mg/dL 12   Creatinine Latest Ref Range: 0.66 - 1.25 mg/dL 0.58 (L)   GFR Estimate Latest Ref Range: >60 mL/min/1.7m2 >90   GFR Estimate If Black Latest Ref Range: >60 mL/min/1.7m2 >90   Calcium Latest Ref Range: 8.5 - 10.1 mg/dL 8.4 (L)   Anion Gap Latest Ref Range: 3 - 14 mmol/L 6   Albumin Latest Ref Range: 3.4 - 5.0 g/dL 2.6 (L)   Protein Total Latest Ref Range: 6.8 - 8.8 g/dL 6.9   Bilirubin Total Latest Ref Range: 0.2 - 1.3 mg/dL 2.2 (H)   Alkaline Phosphatase Latest Ref Range: 40 - 150 U/L 299 (H)   ALT Latest Ref Range: 0 - 70 U/L 64   AST Latest Ref Range: 0 - 45 U/L 79 (H)   Alpha Fetoprotein Latest Ref Range: 0 - 8 ug/L 4.0   Bilirubin Direct Latest Ref Range: 0.0 - 0.2 mg/dL 1.2 (H)   Glucose Latest Ref Range: 70 - 99 mg/dL 266 (H)   WBC Latest Ref Range: 4.0 - 11.0 10e9/L 3.0 (L)   Hemoglobin Latest Ref Range: 13.3 - 17.7 g/dL 12.7 (L)   Hematocrit Latest Ref Range: 40.0 - 53.0 % 37.1 (L)   Platelet Count Latest Ref Range: 150 - 450 10e9/L 94 (L)   RBC Count Latest Ref Range: 4.4 - 5.9 10e12/L 3.80 (L)   MCV Latest Ref Range: 78 - 100 fl 98   MCH Latest Ref Range: 26.5 - 33.0 pg 33.4 (H)   MCHC Latest Ref Range: 31.5 - 36.5 g/dL 34.2   RDW Latest Ref Range: 10.0 - 15.0 % 13.8   INR Latest Ref Range: 0.86 - 1.14  1.53 (H)     Results for orders placed or performed in visit on 05/29/18   Ethyl Glucuronide Urine   Result Value Ref Range    Ethyl Glucuronide Urine NEGATIVE not reported     ASSESSMENT/PLAN:    HCC  S8 3.7 cm OPTN 5B lesion  S7 LIRADS 4 lesions x 2    No evidence of metastatic disease    I recommend IR evaluation for liver directed therapy. He is going to see Dr. Silverman tomorrow  Otherwise we will continue to monitor after that with MRI every 3 months. Will repeat  labs at that time as well  Can re-evaluate for liver transplant if remains sober for at least 6 months    Pulmonary nodule-indeterminate 4 mm nodule in  right lower lobe repeat CT scan in 4 months    Etoh related Cirrhosis. Follows with Dr Carballo. Recently given Rifaximin because of Hepatic Encephalopathy but he has not started it because of high co-pay. He is expecting that his insurance would cover it and then he will start. Unable to tolerate Lactulose    ETOH Abuse. Heavy drinker for 30 years. Last reported drink in February of 2018- Counseled regarding importance of complete abstinence from etoh.     Pancytopenia, mainly thrombocytopenia- in the setting of cirrhosis and portal HTN and splenomegaly. Cont to observe    Hypertension. I discussed that he follows with primary care physician for better blood pressure management.    RTC in 4 months with repeat MRI and CT Chest and labs prior      All questions answered and he agrees with the plan    Hi Melgar

## 2018-05-31 NOTE — PATIENT INSTRUCTIONS
Follow with IR and Dr Carballo    In about 4 months, repeat MRI and CT Chest and labs    See me a few days after that

## 2018-05-31 NOTE — NURSING NOTE
"Oncology Rooming Note    May 31, 2018 4:17 PM   Loy Limon is a 63 year old male who presents for:    Chief Complaint   Patient presents with     Oncology Clinic Visit     New patient Liver Lesion     Initial Vitals: /75  Pulse 59  Temp 98.1  F (36.7  C) (Oral)  Resp 16  Ht 1.721 m (5' 7.75\")  Wt 81.6 kg (180 lb)  SpO2 98%  BMI 27.57 kg/m2 Estimated body mass index is 27.57 kg/(m^2) as calculated from the following:    Height as of this encounter: 1.721 m (5' 7.75\").    Weight as of this encounter: 81.6 kg (180 lb). Body surface area is 1.97 meters squared.  No Pain (0) Comment: Data Unavailable   No LMP for male patient.  Allergies reviewed: Yes  Medications reviewed: Yes    Medications: Medication refills not needed today.  [facility] into [facility] [place] [place] [facility] [address]    Clinical concerns: New patient; liver lesion     6 minutes for nursing intake (face to face time)     Gilma De Guzman [facility]              "

## 2018-05-31 NOTE — PROGRESS NOTES
Oncology initial visit:  Date on this visit: 5/31/2018    Loy Limon  is referred by Dr.Mohamed Wander Herr for an oncology consultation. He requires evaluation for new diagnosis of     Primary Physician: [facility], Oncology     History Of Present Illness:  A professional Japanese interpretor is available throughout the interview.  Mr. Limon is a 63 year old male who has been a chronic heavy alcohol drinker presented with right abdominal pain in March 2018 and workup including a CT scan showed cirrhosis, portal hypertension and 2 hepatic lesions in the right hepatic lobe which were concerning for hepatocellular carcinoma.  He had MRI on 3/16/2018 which confirmed cirrhosis and portal hypertension and splenomegaly. 3.7 cm segment 8 lesion was OPTN 5B.  There was an antecedent 0.9 cm satellite nodule in hepatic segment 8 consistent with LI-RADS 4.  In segment 7, 1.5 cm LIRADS 4 lesion was seen  Another 1.9 cm segment 7 lesion was seen which was indeterminate.  Several other LIRADS 3 lesions were scattered.  Alpha-fetoprotein was not elevated.  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 Hepatitis B surface antibody was reactive consistent with effective vaccination. Hepatitis C antibody was nonreactive.  He has had no ascites. He has had mild hepatic and Neuropathy but was unable to tolerate lactulose because of abdominal discomfort. He has no history of variceal bleeding although he has grade 2 esophageal varices which have not been banded and he continues to be on propranolol.    He had repeat MRI done on 5/24/2018 which showed stable to slight increase in size of the segment 8 lesion.  Segment 7 lesion was consistent with LIRADS 4 lesion  Another segment 7 lesion is also consistent with LIRADS 4 lesion  CT of the chest showed a 4 mm solitary pulmonary nodule in the right lower lobe which remains indeterminate. There was no other evidence of  metastasis.  Bone scan was negative for any evidence of metastatic disease.     He comes in today and tells me that overall his abdominal pain is under decent control. He also has what looks like an inguinal hernia which is reducible. He feels tired but still independent and does usual stuff. No weight loss. Denies nausea vomiting. Denies diarrhea or constipation. No bleeding. No new swellings apart from occasional mild lower extremity swelling. He has had issues with concentration a couple of times but not very recently. He was given a rifaximin by Dr. Carballo as he was unable to tolerate lactulose but he has not started that because it had a high co-pay and he is waiting to get it covered through his insurance. He denies any infections. Occasionally he feels mild itching of the skin. No neuropathy. No difficulty breathing.    ECOG 1    ROS:  A comprehensive ROS was otherwise neg        Past Medical/Surgical History:  Past Medical History:   Diagnosis Date     Cirrhosis of liver (H) 2018     Enlarged prostate      Inguinal hernia      Liver lesion 2018     Past Surgical History:   Procedure Laterality Date     APPENDECTOMY       Cancer History:   As above    Allergies:  Allergies as of 2018     (No Known Allergies)     Current Medications:  Current Outpatient Prescriptions   Medication Sig Dispense Refill     omeprazole (PRILOSEC) 20 MG CR capsule Take 20 mg by mouth       propranolol (INDERAL) 10 MG tablet Take 10 mg by mouth       traMADol (ULTRAM) 50 MG tablet Take 50 mg by mouth       rifaximin (XIFAXAN) 550 MG TABS tablet Take 1 tablet (550 mg) by mouth 2 times daily (Patient not taking: Reported on 2018) 60 tablet 3      Family History:  No family history on file.   Maternal grand mother had Uterus cancer at 63  He has 3 living children. One son with schizophrenia  One daughter dies of leukemia at 8 years of age  Son  shortly after birth. He had some brain congenital anomaly    Social  "History:  Social History     Social History     Marital status:      Spouse name: N/A     Number of children: N/A     Years of education: N/A     Occupational History     Not on file.     Social History Main Topics     Smoking status: Former Smoker     Types: Cigarettes, Cigars     Smokeless tobacco: Never Used     Alcohol use Yes      Comment: Quit drinking Feb 2018     Drug use: No     Sexual activity: Not on file     Other Topics Concern     Not on file     Social History Narrative   smoker for 25 years. One pack for 1 week. Quit in Feb 2018.  Has been a heavy drinker for 30 years, beer about 36 every week. Last drink was around Feb 2018.  No drug use.  Lives with wife. 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  Physical Exam:  /75  Pulse 59  Temp 98.1  F (36.7  C) (Oral)  Resp 16  Ht 1.721 m (5' 7.75\")  Wt 81.6 kg (180 lb)  SpO2 98%  BMI 27.57 kg/m2  CONSTITUTIONAL: no acute distress  EYES: PERRLA, no palor but there is mild icterus.   ENT/MOUTH: no mouth lesions. Ears normal  CVS: s1s2 no m r g .   RESPIRATORY: clear to auscultation b/l  GI: soft non tender. I can feel the liver edge about 3-4 fingerbreadths below costal margin. I cannot feel the spleen  NEURO: AAOX3  Grossly non focal neuro exam. He has mild asterixis  INTEGUMENT: no obvious rashes  LYMPHATIC: no palpable cervical, supraclavicular, axillary or inguinal LAD  MUSCULOSKELETAL: Unremarkable. No bony tenderness.   EXTREMITIES: Trace edema  PSYCH: Mentation, mood and affect are normal. Decision making capacity is intact    Laboratory/Imaging Studies    Results for DONNA BIGGS (MRN [id number]) as of 5/31/2018 16:06   Ref. Range 5/29/2018 07:54   Sodium Latest Ref Range: 133 - 144 mmol/L 136   Potassium Latest Ref Range: 3.4 - 5.3 mmol/L 4.6   Chloride Latest Ref Range: 94 - 109 mmol/L 106   Carbon Dioxide Latest Ref Range: 20 - 32 mmol/L 24   Urea Nitrogen Latest Ref Range: 7 - 30 mg/dL 12   Creatinine " Latest Ref Range: 0.66 - 1.25 mg/dL 0.58 (L)   GFR Estimate Latest Ref Range: >60 mL/min/1.7m2 >90   GFR Estimate If Black Latest Ref Range: >60 mL/min/1.7m2 >90   Calcium Latest Ref Range: 8.5 - 10.1 mg/dL 8.4 (L)   Anion Gap Latest Ref Range: 3 - 14 mmol/L 6   Albumin Latest Ref Range: 3.4 - 5.0 g/dL 2.6 (L)   Protein Total Latest Ref Range: 6.8 - 8.8 g/dL 6.9   Bilirubin Total Latest Ref Range: 0.2 - 1.3 mg/dL 2.2 (H)   Alkaline Phosphatase Latest Ref Range: 40 - 150 U/L 299 (H)   ALT Latest Ref Range: 0 - 70 U/L 64   AST Latest Ref Range: 0 - 45 U/L 79 (H)   Alpha Fetoprotein Latest Ref Range: 0 - 8 ug/L 4.0   Bilirubin Direct Latest Ref Range: 0.0 - 0.2 mg/dL 1.2 (H)   Glucose Latest Ref Range: 70 - 99 mg/dL 266 (H)   WBC Latest Ref Range: 4.0 - 11.0 10e9/L 3.0 (L)   Hemoglobin Latest Ref Range: 13.3 - 17.7 g/dL 12.7 (L)   Hematocrit Latest Ref Range: 40.0 - 53.0 % 37.1 (L)   Platelet Count Latest Ref Range: 150 - 450 10e9/L 94 (L)   RBC Count Latest Ref Range: 4.4 - 5.9 10e12/L 3.80 (L)   MCV Latest Ref Range: 78 - 100 fl 98   MCH Latest Ref Range: 26.5 - 33.0 pg 33.4 (H)   MCHC Latest Ref Range: 31.5 - 36.5 g/dL 34.2   RDW Latest Ref Range: 10.0 - 15.0 % 13.8   INR Latest Ref Range: 0.86 - 1.14  1.53 (H)     Results for orders placed or performed in visit on 05/29/18   Ethyl Glucuronide Urine   Result Value Ref Range    Ethyl Glucuronide Urine NEGATIVE not reported     ASSESSMENT/PLAN:    HCC  S8 3.7 cm OPTN 5B lesion  S7 LIRADS 4 lesions x 2    No evidence of metastatic disease    I recommend IR evaluation for liver directed therapy. He is going to see Dr. Silverman tomorrow  Otherwise we will continue to monitor after that with MRI every 3 months. Will repeat labs at that time as well  Can re-evaluate for liver transplant if remains sober for at least 6 months    Pulmonary nodule-indeterminate 4 mm nodule in  right lower lobe repeat CT scan in 4 months    Etoh related Cirrhosis. Follows with Dr Carballo.  Recently given Rifaximin because of Hepatic Encephalopathy but he has not started it because of high co-pay. He is expecting that his insurance would cover it and then he will start. Unable to tolerate Lactulose    ETOH Abuse. Heavy drinker for 30 years. Last reported drink in February of 2018- Counseled regarding importance of complete abstinence from etoh.     Pancytopenia, mainly thrombocytopenia- in the setting of cirrhosis and portal HTN and splenomegaly. Cont to observe    Hypertension. I discussed that he follows with primary care physician for better blood pressure management.    RTC in 4 months with repeat MRI and CT Chest and labs prior      All questions answered and he agrees with the plan    Hi Melgar

## 2018-05-31 NOTE — MR AVS SNAPSHOT
After Visit Summary   5/31/2018    Loy Limon    MRN: [id number]           Patient Information     Date Of Birth          1955        Visit Information        Provider Department      5/31/2018 3:45 PM Lennie Luz Aazim K, MD [facility]        Today's Diagnoses     Pulmonary nodules    -  1    Cirrhosis (H)          Care Instructions    Follow with IR and Dr Carballo    In about 4 months, repeat MRI and CT Chest and labs    See me a few days after that              Follow-ups after your visit        Your next 10 appointments already scheduled     Jun 01, 2018  9:00 AM CDT   New Patient Visit with Debora Ac MD   [facility] ([facility])    [address]  [address]  [facility] [id number] [phone]            Oct 03, 2018  8:30 AM CDT   MR ABDOMEN W/O & W CONTRAST with [address] MRI ([facility])    [address]  1st Floor  [facility] [id number] [phone]           Take your medicines as usual, unless your doctor tells you not to. Bring a list of your current medicines to your exam (including vitamins, minerals and over-the-counter drugs). Also bring the results of similar scans you may have had.    You may or may not receive IV contrast for this exam pending the discretion of the Radiologist.   Do not eat or drink for 6 hours prior to exam.  The MRI machine uses a strong magnet. Please wear clothes without metal (snaps, zippers). A sweatsuit works well, or we may give you a hospital gown.  Please remove any body piercings and hair extensions before you arrive. You will also remove watches, jewelry, hairpins, wallets, dentures, partial dental plates and hearing aids. You may wear contact lenses, and you may be able to wear your rings. We have a safe place to keep your personal items, but it is safer to leave them at home.   **IMPORTANT** THE INSTRUCTIONS BELOW ARE ONLY FOR THOSE PATIENTS WHO HAVE BEEN PRESCRIBED SEDATION OR GENERAL ANESTHESIA DURING THEIR MRI PROCEDURE:  IF YOUR DOCTOR PRESCRIBED ORAL SEDATION (take medicine to help you relax during your exam):   You must get the medicine from your doctor (oral medication) before you arrive. Bring the medicine to the exam. Do not take it at home. You ll be told when to take it upon arriving for your exam.   Arrive one hour early. Bring someone who can take you home after the test. Your medicine will make you sleepy. After the exam, you may not drive, take a bus or take a taxi by yourself.  IF YOUR DOCTOR PRESCRIBED IV SEDATION:   Arrive one hour early. Bring someone who can take you home after the test. Your medicine will make you sleepy. After the exam, you may not drive, take a bus or take a taxi by yourself.   No eating 6 hours before your exam. You may have clear liquids up until 4 hours before your exam. (Clear liquids include water, clear tea, black coffee and fruit juice without pulp.)  IF YOUR DOCTOR PRESCRIBED ANESTHESIA (be asleep for your exam):   Arrive 1 1/2 hours early. Bring someone who can take you home after the test. You may not drive, take a bus or take a taxi by yourself.   No eating 8 hours before your exam. You may have clear liquids up until 4 hours before your exam. (Clear liquids include water, clear tea, black coffee and fruit juice without pulp.)   You will spend four to five hours in the recovery room.  If you have any questions, please contact your Imaging Department exam site.            Oct 03, 2018  9:20 AM CDT   CT CHEST W/O CONTRAST with UCCT1   [facility] ([facility])    [address] [address] [phone]           Please bring any scans or X-rays taken at other hospitals, if similar tests were done. Also bring a list of your medicines, including vitamins, minerals and  over-the-counter drugs. It is safest to leave personal items at home.  Be sure to tell your doctor:   If you have any allergies.   If there s any chance you are pregnant.   If you are breastfeeding.  You do not need to do anything special to prepare for this exam.  Please wear loose clothing, such as a sweat suit or jogging clothes. Avoid snaps, zippers and other metal. We may ask you to undress and put on a hospital gown.            Oct 03, 2018  9:45 AM CDT   Lab with  LAB   [facility] ([facility])    [address]  1st Floor  [facility] [address] [phone]            Oct 04, 2018  4:00 PM CDT   (Arrive by 3:45 PM)   Return Visit with Hi Melgar MD   [facility] ([facility])    [address] [place] [address]  [address]  [facility] [address] [phone]              Future tests that were ordered for you today     Open Future Orders        Priority Expected Expires Ordered    *CBC with platelets differential Routine 9/30/2018 5/31/2019 5/31/2018    Comprehensive metabolic panel Routine 9/30/2018 5/31/2019 5/31/2018    AFP tumor marker Routine 9/30/2018 5/31/2019 5/31/2018    MRI Abdomen w & w/o contrast Routine 9/30/2018 5/31/2019 5/31/2018    CT Chest w/o contrast Routine 9/30/2018 5/31/2019 5/31/2018            Who to contact     If you have questions or need follow up information about today's clinic visit or your schedule please contact [facility] directly at [phone].  Normal or non-critical lab and imaging results will be communicated to you by MyChart, letter or phone within 4 business days after the clinic has received the results. If you do not hear from us within 7 days, please contact the clinic through MyChart or phone. If you have a critical or abnormal lab result, we will notify you by phone as soon as possible.  Submit refill requests through [employer] or call your pharmacy and they will  "forward the refill request to us. Please allow 3 business days for your refill to be completed.          Additional Information About Your Visit        Numer[employer] Seeds Information     [employer] lets you send messages to your doctor, view your test results, renew your prescriptions, schedule appointments and more. To sign up, go to [email or web address] . Click on \"Log in\" on the left side of the screen, which will take you to the Welcome page. Then click on \"Sign up Now\" on the right side of the page.     You will be asked to enter the access code listed below, as well as some personal information. Please follow the directions to create your username and password.     Your access code is: [email or web address]  Expires: 2018  8:03 AM     Your access code will  in 90 days. If you need help or a new code, please call your [place] clinic or [phone].        Care EveryWhere ID     This is your Care EveryWhere ID. This could be used by other organizations to access your [place] medical records  [id number]        Your Vitals Were     Pulse Temperature Respirations Height Pulse Oximetry BMI (Body Mass Index)    59 98.1  F (36.7  C) (Oral) 16 1.721 m (5' 7.75\") 98% 27.57 kg/m2       Blood Pressure from Last 3 Encounters:   18 168/75   18 138/69   18 138/69    Weight from Last 3 Encounters:   18 81.6 kg (180 lb)   18 81.8 kg (180 lb 6.4 oz)   18 81.8 kg (180 lb 6.4 oz)               Primary Care Provider    Oncology [facility], MD       [address] [address]        Equal Access to Services     DAVID COWAN AH: Wyatt Pittman, karin cheema, ciara hill, javier mckay. So [facility] [phone].    ATENCIÓN: Si habla español, tiene a samanigeo disposición servicios gratuitos de asistencia lingüística. Llame al [phone].    We comply with applicable federal civil rights laws and [place] laws. We do not " discriminate on the basis of race, color, national origin, age, disability, sex, sexual orientation, or gender identity.            Thank you!     Thank you for choosing [facility]  for your care. Our goal is always to provide you with excellent care. Hearing back from our patients is one way we can continue to improve our services. Please take a few minutes to complete the written survey that you may receive in the mail after your visit with us. Thank you!             Your Updated Medication List - Protect others around you: Learn how to safely use, store and throw away your medicines at www.disposemymeds.org.          This list is accurate as of 5/31/18  5:31 PM.  Always use your most recent med list.                   Brand Name Dispense Instructions for use Diagnosis    omeprazole 20 MG CR capsule    priLOSEC     Take 20 mg by mouth        propranolol 10 MG tablet    INDERAL     Take 10 mg by mouth    Cirrhosis (H), Pulmonary nodules       rifaximin 550 MG Tabs tablet    XIFAXAN    60 tablet    Take 1 tablet (550 mg) by mouth 2 times daily    Hepatic encephalopathy (H)       traMADol 50 MG tablet    ULTRAM     Take 50 mg by mouth

## 2018-05-31 NOTE — LETTER
May 31, 2018      RE: Loy Limon  [address]  [facility] [address]       To Whom it May Concern,    I am writing this letter outlining work restrictions for my patient, Maty Limon.     Mr. Limon cannot work more than 36 hours per week due to his illness, and cannot lift more than 15 pounds because of his hernia.       Sincerely,      MD Luci Viera,

## 2018-06-01 ENCOUNTER — OFFICE VISIT (OUTPATIENT)
Dept: RADIOLOGY | Facility: CLINIC | Age: 63
End: 2018-06-01
Attending: RADIOLOGY
Payer: MEDICARE

## 2018-06-01 ENCOUNTER — DOCUMENTATION ONLY (OUTPATIENT)
Dept: TRANSPLANT | Facility: CLINIC | Age: 63
End: 2018-06-01

## 2018-06-01 VITALS
HEIGHT: 68 IN | DIASTOLIC BLOOD PRESSURE: 72 MMHG | TEMPERATURE: 97.6 F | WEIGHT: 181.5 LBS | RESPIRATION RATE: 16 BRPM | OXYGEN SATURATION: 99 % | HEART RATE: 62 BPM | SYSTOLIC BLOOD PRESSURE: 145 MMHG | BODY MASS INDEX: 27.51 KG/M2

## 2018-06-01 DIAGNOSIS — C22.0 HCC (HEPATOCELLULAR CARCINOMA) (H): Primary | ICD-10-CM

## 2018-06-01 PROCEDURE — G0463 HOSPITAL OUTPT CLINIC VISIT: HCPCS | Mod: ZF

## 2018-06-01 ASSESSMENT — PAIN SCALES - GENERAL: PAINLEVEL: NO PAIN (0)

## 2018-06-01 NOTE — MR AVS SNAPSHOT
After Visit Summary   6/1/2018    Loy Limon    MRN: [id number]           Patient Information     Date Of Birth          1955        Visit Information        Provider Department      6/1/2018 9:00 AM Debora Ac MD; LILLI FORMAN [facility]        Today's Diagnoses     HCC (hepatocellular carcinoma) (H)    -  1      Care Instructions    We will call you with the appointment details of your Chemoembolization procedure.     Please check into the [facility] Waiting room, located on the main level, at [facility], located at [address] [place] [address]    -You will check in 1.5 hours prior  to the appointment time.    Reminders:    -No solids or milk products 6 hours prior to procedure time.    -No clear liquids 2 hours prior to procedure time.     -Please take your morning medications as indicated with enough water to swallow.    --You will be admitted overnight after the procedure, so therefore you may pack an overnight bag.     -Post follow up: We will call you 2-3 days after you leave to follow up after the procedure.     We will also have you return in 1 mos with an MRI and follow upappt.       *Please keep in mind that you may have Post Embolization syndrome.  The reason for this is because the tumor is dying.     This includes:    -high fevers 101-102, which may last for a couple of days. We recommend using over the counter medications such as Tylenol or Ibuprofen.     -Nausea, in which we will give you medications after you are discharged home.     -Decreased appetite: We don't expect you to eat 3 full meals. Instead, we prefer that you  snack through out the day.     -Feeling fatigue: this is normal, however we recommend that you get up and walk around.     **Please keep in mind to stay hydrated after the procedure. At  Least 6 glasses of water a day.      *Abnormal symptoms in which to call or seek immediate  help:  1. Confusion!!-GO TO THE EMERGENCY ROOM.  2. Swelling of the lower legs  3. Severe abdominal pain that doesn't go away with pain medications.   4. High fevers that do not come down with over the counter medications.     Should ANY of the above symptoms are exhibited, please seek immediate help or call our hospital at [phone] and ask for the Interventional Radiologist On-call (after hours) or you can contact me (during business hours) 8-4pm.    Should you have any further questions, please call us at anytime. It has been a pleasure to see you today.        Izabela Nolen RN  Interventional Radiology Nurse Coordinator   Phone: [phone]                Follow-ups after your visit        Your next 10 appointments already scheduled     Oct 03, 2018  8:30 AM CDT   MR ABDOMEN W/O & W CONTRAST with [address] MRI ([email or web address])    [address] [address] [phone]           Take your medicines as usual, unless your doctor tells you not to. Bring a list of your current medicines to your exam (including vitamins, minerals and over-the-counter drugs). Also bring the results of similar scans you may have had.    You may or may not receive IV contrast for this exam pending the discretion of the Radiologist.   Do not eat or drink for 6 hours prior to exam.  The MRI machine uses a strong magnet. Please wear clothes without metal (snaps, zippers). A sweatsuit works well, or we may give you a hospital gown.  Please remove any body piercings and hair extensions before you arrive. You will also remove watches, jewelry, hairpins, wallets, dentures, partial dental plates and hearing aids. You may wear contact lenses, and you may be able to wear your rings. We have a safe place to keep your personal items, but it is safer to leave them at home.  **IMPORTANT** THE INSTRUCTIONS BELOW ARE ONLY FOR THOSE PATIENTS WHO HAVE BEEN PRESCRIBED  SEDATION OR GENERAL ANESTHESIA DURING THEIR MRI PROCEDURE:  IF YOUR DOCTOR PRESCRIBED ORAL SEDATION (take medicine to help you relax during your exam):   You must get the medicine from your doctor (oral medication) before you arrive. Bring the medicine to the exam. Do not take it at home. You ll be told when to take it upon arriving for your exam.   Arrive one hour early. Bring someone who can take you home after the test. Your medicine will make you sleepy. After the exam, you may not drive, take a bus or take a taxi by yourself.  IF YOUR DOCTOR PRESCRIBED IV SEDATION:   Arrive one hour early. Bring someone who can take you home after the test. Your medicine will make you sleepy. After the exam, you may not drive, take a bus or take a taxi by yourself.   No eating 6 hours before your exam. You may have clear liquids up until 4 hours before your exam. (Clear liquids include water, clear tea, black coffee and fruit juice without pulp.)  IF YOUR DOCTOR PRESCRIBED ANESTHESIA (be asleep for your exam):   Arrive 1 1/2 hours early. Bring someone who can take you home after the test. You may not drive, take a bus or take a taxi by yourself.   No eating 8 hours before your exam. You may have clear liquids up until 4 hours before your exam. (Clear liquids include water, clear tea, black coffee and fruit juice without pulp.)   You will spend four to five hours in the recovery room.  If you have any questions, please contact your Imaging Department exam site.            Oct 03, 2018  9:20 AM CDT   CT CHEST W/O CONTRAST with UCCT1   [facility] ([email or web address])    [address] [address] [phone]           Please bring any scans or X-rays taken at other hospitals, if similar tests were done. Also bring a list of your medicines, including vitamins, minerals and over-the-counter drugs. It is safest to leave personal items at home.  Be sure to tell your  doctor:   If you have any allergies.   If there s any chance you are pregnant.   If you are breastfeeding.  You do not need to do anything special to prepare for this exam.  Please wear loose clothing, such as a sweat suit or jogging clothes. Avoid snaps, zippers and other metal. We may ask you to undress and put on a hospital gown.            Oct 03, 2018  9:45 AM CDT   Lab with  LAB   [facility] ([facility])    [address]  1st Floor  [facility] [address] [phone]            Oct 04, 2018  4:00 PM CDT   (Arrive by 3:45 PM)   Return Visit with Hi Melgar MD   [facility] ([facility])    [address] [place] [address]  [address]  [facility] [address] [phone]              Future tests that were ordered for you today     Open Future Orders        Priority Expected Expires Ordered    IR Chemo Embolization Routine  6/1/2019 6/1/2018    *CBC with platelets differential Routine 9/30/2018 5/31/2019 5/31/2018    Comprehensive metabolic panel Routine 9/30/2018 5/31/2019 5/31/2018    AFP tumor marker Routine 9/30/2018 5/31/2019 5/31/2018    MRI Abdomen w & w/o contrast Routine 9/30/2018 5/31/2019 5/31/2018    CT Chest w/o contrast Routine 9/30/2018 5/31/2019 5/31/2018            Who to contact     If you have questions or need follow up information about today's clinic visit or your schedule please contact [facility] directly at [phone].  Normal or non-critical lab and imaging results will be communicated to you by MyChart, letter or phone within 4 business days after the clinic has received the results. If you do not hear from us within 7 days, please contact the clinic through MyChart or phone. If you have a critical or abnormal lab result, we will notify you by phone as soon as possible.  Submit refill requests through [employer] or call your pharmacy and they will forward the refill request to us. Please  "allow 3 business days for your refill to be completed.          Additional Information About Your Visit        MyChart Information     [employer] lets you send messages to your doctor, view your test results, renew your prescriptions, schedule appointments and more. To sign up, go to [email or web address] . Click on \"Log in\" on the left side of the screen, which will take you to the Welcome page. Then click on \"Sign up Now\" on the right side of the page.     You will be asked to enter the access code listed below, as well as some personal information. Please follow the directions to create your username and password.     Your access code is: [email or web address]  Expires: 2018  8:03 AM     Your access code will  in 90 days. If you need help or a new code, please call your [place] clinic or [phone].        Care EveryWhere ID     This is your Care EveryWhere ID. This could be used by other organizations to access your [place] medical records  [id number]        Your Vitals Were     Pulse Temperature Respirations Height Pulse Oximetry BMI (Body Mass Index)    62 97.6  F (36.4  C) (Oral) 16 1.721 m (5' 7.75\") 99% 27.8 kg/m2       Blood Pressure from Last 3 Encounters:   18 145/72   18 168/75   18 138/69    Weight from Last 3 Encounters:   18 82.3 kg (181 lb 8 oz)   18 81.6 kg (180 lb)   18 81.8 kg (180 lb 6.4 oz)               Primary Care Provider    Oncology [facility], MD       [address] [place] [address]        Equal Access to Services     URIEL COWAN : Hadii kostas Pittman, sunda anette, qaybta kaalmajavier billings. So [facility] [phone].    ATENCIÓN: Si habla español, tiene a samaniego disposición servicios gratuitos de asistencia lingüística. Llame al [phone].    We comply with applicable federal civil rights laws and [place] laws. We do not discriminate on the basis of race, color, " national origin, age, disability, sex, sexual orientation, or gender identity.            Thank you!     Thank you for choosing [facility]  for your care. Our goal is always to provide you with excellent care. Hearing back from our patients is one way we can continue to improve our services. Please take a few minutes to complete the written survey that you may receive in the mail after your visit with us. Thank you!             Your Updated Medication List - Protect others around you: Learn how to safely use, store and throw away your medicines at www.disposemymeds.org.          This list is accurate as of 6/1/18 10:07 AM.  Always use your most recent med list.                   Brand Name Dispense Instructions for use Diagnosis    NADOLOL PO      Take 20 mg by mouth daily        omeprazole 20 MG CR capsule    priLOSEC     Take 20 mg by mouth        propranolol 10 MG tablet    INDERAL     Take 10 mg by mouth    Cirrhosis (H), Pulmonary nodules       rifaximin 550 MG Tabs tablet    XIFAXAN    60 tablet    Take 1 tablet (550 mg) by mouth 2 times daily    Hepatic encephalopathy (H)       traMADol 50 MG tablet    ULTRAM     Take 50 mg by mouth

## 2018-06-01 NOTE — PROGRESS NOTES
Interventional Radiology Clinic Visit    Date of this visit: 6/1/2018    Loy Limon  is referred for treatment recommendations. The patient requires evaluation for diagnosis of hepatocellular carcinoma (HCC).    Primary Physician: [facility], Oncology        History Of Present Illness & ROS:    Loy Limon is a 63 year old male who presents with diagnosis of HCC on a background of Etoh Cirrhosis. Patient was a chronic alcohol drinker who presented in March 2018 for right upper quadrant abdominal pain, workup with CT shows cirrhosis portal hypertension and HCC. MRI and 3/16/2018 confirmed cirrhosis, portal hypertension and a 3.7 cm segment 8 lesion OPTN5, and additional smaller lesions which were not yet diagnostic for HCC. Negative for hepatitis B and hepatitis C. Patient denies history of variceal bleeding or banding, continues on propranolol. Patient is not sure if he has ever had episodes of confusion, or other symptoms possibly representing hepatic encephalopathy. Denies any other significant medical issues, had surgery approximately 25 years ago for infection and appendicitis.    Patient states that he has vague right upper quadrant pain. He is also concerned about pain from his right inguinal hernia. Denies any vision changes, headaches, fevers, chest pain, shortness of breath, nausea, vomiting, blood in the stool, black tarry stools, urinating difficulties, new swelling of his hands or feet. Review of systems otherwise unremarkable.    Review of Systems:     See HPI    Past Medical/Surgical History:    Past Medical History:   Diagnosis Date     Cirrhosis of liver (H) 5/8/2018     Enlarged prostate      Inguinal hernia      Liver lesion 5/8/2018     Past Surgical History:   Procedure Laterality Date     APPENDECTOMY         Current Medications:    Current Outpatient Prescriptions   Medication Sig Dispense Refill     NADOLOL PO Take 20 mg by mouth daily       omeprazole  "(PRILOSEC) 20 MG CR capsule Take 20 mg by mouth       traMADol (ULTRAM) 50 MG tablet Take 50 mg by mouth       propranolol (INDERAL) 10 MG tablet Take 10 mg by mouth       rifaximin (XIFAXAN) 550 MG TABS tablet Take 1 tablet (550 mg) by mouth 2 times daily (Patient not taking: Reported on 5/31/2018) 60 tablet 3       Allergies:    Review of patient's allergies indicates no known allergies.    Family History:    No family history on file.    Social History:    Works up to 36 hours a week.  Lives with wife. Sober for 4 months.     Social History     Social History     Marital status:      Spouse name: N/A     Number of children: N/A     Years of education: N/A     Social History Main Topics     Smoking status: Former Smoker     Types: Cigarettes, Cigars     Smokeless tobacco: Never Used     Alcohol use Yes      Comment: Quit drinking Feb 2018     Drug use: No     Sexual activity: Not Asked     Other Topics Concern     None     Social History Narrative       Physical Exam:    /72  Pulse 62  Temp 97.6  F (36.4  C) (Oral)  Resp 16  Ht 1.721 m (5' 7.75\")  Wt 82.3 kg (181 lb 8 oz)  SpO2 99%  BMI 27.8 kg/m2     GENERAL APPEARANCE: healthy, alert and no distress  PSYCHIATRIC: mentation appears normal and affect normal.  EYES: No jaundice.  SKIN: No jaundice.   RESP: lungs clear to auscultation - no rales, rhonchi or wheezes  CARDIOVASCULAR: regular rates and rhythm, normal S1 S2, no S3 or S4 and no murmur  ABDOMEN:  Tender to palpation in RUQ. bowel sounds normal.  MUSCULOSKELETAL: 1+ edema at the ankle    Laboratory Studies:    Lab Results   Component Value Date    HGB 12.7 05/29/2018     Lab Results   Component Value Date    PLT 94 05/29/2018     Lab Results   Component Value Date    WBC 3.0 05/29/2018       Lab Results   Component Value Date    INR 1.53 05/29/2018       Lab Results   Component Value Date    PROTTOTAL 6.9 05/29/2018      Lab Results   Component Value Date    ALBUMIN 2.6 05/29/2018 "     Lab Results   Component Value Date    BILITOTAL 2.2 05/29/2018     Lab Results   Component Value Date    ALKPHOS 299 05/29/2018     Lab Results   Component Value Date    AST 79 05/29/2018     Lab Results   Component Value Date    ALT 64 05/29/2018       Lab Results   Component Value Date    CR 0.58 05/29/2018     Lab Results   Component Value Date    BUN 12 05/29/2018       Alpha Fetoprotein   Date Value Ref Range Status   05/29/2018 4.0 0 - 8 ug/L Final     Comment:     Assay Method:  Chemiluminescence using Siemens Centaur XP       Imaging:     I reviewed patient's MRI from 5/24/2018. It showed a 3.6 cm HCC in hepatic segment 8, and smaller lesions in hepatic segment 7 not yet diagnostic for HCC. Evidence of cirrhosis and portal hypertension. I reviewed CT chest 5/24/2018 and nuclear medicine bone scan of 5/24/2018 and are negative for metastatic disease.    ASSESSMENT:      Loy Limon is a 63 year old male who presents with diagnosis of HCC on a background of Etoh cirrhosis.    Child-Ivey score: B  Native MELD score: 14  ECOG performance status: 0-1    I believe the patient is a candidate for a transarterial chemoembolization procedure.     I showed patient the imaging and discussed the findings. I discussed with patient that the goal of the procedure is disease control with a survival benefit rather than a cure given the nature of the disease, but that sometimes lesions will be cured in this manner. Alternatives were reviewed, including surgery, but the patient is not a surgical candidate.    I explained that the chemoembolization is performed under conscious sedation. We discussed what will happen before, during and after the procedure; what to expect in the post procedure recovery period; and what the follow-up will be. We discussed the risks of the procedure which include, but are not limited to, vascular injury causing bleeding or occlusion of blood vessels, groin hematoma, infection, liver failure,  and non-target embolization. I explained that sometimes more than one treatment session is needed to gain control of the tumors, particularly with larger tumors. Risks are increased the greater the deviation from normal lab values, especially albumin and total bilirubin, and also the larger the area we must treat. We also discussed the post-embolization syndrome which is likely and consists of varying degrees of pain, nausea/vomiting, malaise, fever, and elevated white blood cell counts for up to 2 weeks following the procedure. The patient also understands that new tumors may develop elsewhere given the nature of the disease. Many patients go home the next day, but occasionally circumstances are such that a longer admission may be required. I also informed the patient that I will be assisted during the procedure by a fellow and/or resident, and that sometimes a medical student may observe. All of the patient's questions were answered and the patient agreed to proceed.     PLAN:    We will schedule the patient for the chemoembolization procedure.  Will consider adjunctive ablation at a later date, depending on initial chemoembolization response.      A total of 45 minutes was spent in care for the patient, of which >50% was spent in counseling and co-ordination of care.    It was a pleasure seeing the patient.     Signed,    Thai Cortez MD & Debora Del Rosario MD   Department of Interventional Radiology  [facility]      I, Dr Debora Del Rosario, was present with the fellow during the history and exam. I discussed the case with the fellow and agree with the findings as documented in the assessment and plan.      CC  Patient Care Team:  [facility], Oncology, MD as PCP - General (Radiology)  Dario Anguiano MD as MD (Vascular and Interventional Radiology)  David Champagne MD (Gastroenterology)  JOSE SCOTT

## 2018-06-01 NOTE — NURSING NOTE
"Oncology Rooming Note    June 1, 2018 9:29 AM   Loy Limon is a 63 year old male who presents for:    Chief Complaint   Patient presents with     Oncology Clinic Visit     New Pt. Transplant      Initial Vitals: /72  Pulse 62  Temp 97.6  F (36.4  C) (Oral)  Resp 16  Ht 1.721 m (5' 7.75\")  Wt 82.3 kg (181 lb 8 oz)  SpO2 99%  BMI 27.8 kg/m2 Estimated body mass index is 27.8 kg/(m^2) as calculated from the following:    Height as of this encounter: 1.721 m (5' 7.75\").    Weight as of this encounter: 82.3 kg (181 lb 8 oz). Body surface area is 1.98 meters squared.  No Pain (0) Comment: Data Unavailable   No LMP for male patient.  Allergies reviewed: Yes  Medications reviewed: Yes    Medications: Medication refills not needed today.  [facility] EPIC[facility] [place] [place] [facility] [address]    Clinical concerns: new patient here for Transplant consult dr. Ac was notified.    10 minutes for nursing intake (face to face time)     Erwin Luong CMA              "

## 2018-06-01 NOTE — PROGRESS NOTES
5/24/18 MR  1.) 4 cm 5B  2.) other OPTN 4's suspicious for HCC  3.) OPTN 3 lesion    Recs:  - IR to treat with TACE  - patient within Gage criteria and should continue txp evaluation from tumor conf standpoint with close follow up

## 2018-06-01 NOTE — PATIENT INSTRUCTIONS
We will call you with the appointment details of your Chemoembolization procedure.     Please check into the Gold Waiting room, located on the main level, at [facility], located at [address], [place] [address]    -You will check in 1.5 hours prior  to the appointment time.    Reminders:    -No solids or milk products 6 hours prior to procedure time.    -No clear liquids 2 hours prior to procedure time.     -Please take your morning medications as indicated with enough water to swallow.    --You will be admitted overnight after the procedure, so therefore you may pack an overnight bag.     -Post follow up: We will call you 2-3 days after you leave to follow up after the procedure.     We will also have you return in 1 mos with an MRI and follow upappt.       *Please keep in mind that you may have Post Embolization syndrome.  The reason for this is because the tumor is dying.     This includes:    -high fevers 101-102, which may last for a couple of days. We recommend using over the counter medications such as Tylenol or Ibuprofen.     -Nausea, in which we will give you medications after you are discharged home.     -Decreased appetite: We don't expect you to eat 3 full meals. Instead, we prefer that you  snack through out the day.     -Feeling fatigue: this is normal, however we recommend that you get up and walk around.     **Please keep in mind to stay hydrated after the procedure. At  Least 6 glasses of water a day.      *Abnormal symptoms in which to call or seek immediate help:  1. Confusion!!-GO TO THE EMERGENCY ROOM.  2. Swelling of the lower legs  3. Severe abdominal pain that doesn't go away with pain medications.   4. High fevers that do not come down with over the counter medications.     Should ANY of the above symptoms are exhibited, please seek immediate help or call our hospital at [phone] and ask for the Interventional Radiologist On-call (after hours) or you  can contact me (during business hours) 8-4pm.    Should you have any further questions, please call us at anytime. It has been a pleasure to see you today.        Izabela Nolen RN  Interventional Radiology Nurse Coordinator   Phone: [phone]

## 2018-06-01 NOTE — LETTER
6/1/2018       RE: Loy Limon  [address]  [facility] [address]     Dear Colleague,    Thank you for referring your patient, Loy Limon, to the [facility]. Please see a copy of my visit note below.          Interventional Radiology Clinic Visit    Date of this visit: 6/1/2018    Loy Limon  is referred for treatment recommendations. The patient requires evaluation for diagnosis of hepatocellular carcinoma (HCC).    Primary Physician: [facility], Oncology        History Of Present Illness & ROS:    Loy Limon is a 63 year old male who presents with diagnosis of HCC on a background of Etoh Cirrhosis. Patient was a chronic alcohol drinker who presented in March 2018 for right upper quadrant abdominal pain, workup with CT shows cirrhosis portal hypertension and HCC. MRI and 3/16/2018 confirmed cirrhosis, portal hypertension and a 3.7 cm segment 8 lesion OPTN5, and additional smaller lesions which were not yet diagnostic for HCC. Negative for hepatitis B and hepatitis C. Patient denies history of variceal bleeding or banding, continues on propranolol. Patient is not sure if he has ever had episodes of confusion, or other symptoms possibly representing hepatic encephalopathy. Denies any other significant medical issues, had surgery approximately 25 years ago for infection and appendicitis.    Patient states that he has vague right upper quadrant pain. He is also concerned about pain from his right inguinal hernia. Denies any vision changes, headaches, fevers, chest pain, shortness of breath, nausea, vomiting, blood in the stool, black tarry stools, urinating difficulties, new swelling of his hands or feet. Review of systems otherwise unremarkable.    Review of Systems:     See HPI    Past Medical/Surgical History:    Past Medical History:   Diagnosis Date     Cirrhosis of liver (H) 5/8/2018     Enlarged prostate      Inguinal hernia      Liver lesion  "5/8/2018     Past Surgical History:   Procedure Laterality Date     APPENDECTOMY         Current Medications:    Current Outpatient Prescriptions   Medication Sig Dispense Refill     NADOLOL PO Take 20 mg by mouth daily       omeprazole (PRILOSEC) 20 MG CR capsule Take 20 mg by mouth       traMADol (ULTRAM) 50 MG tablet Take 50 mg by mouth       propranolol (INDERAL) 10 MG tablet Take 10 mg by mouth       rifaximin (XIFAXAN) 550 MG TABS tablet Take 1 tablet (550 mg) by mouth 2 times daily (Patient not taking: Reported on 5/31/2018) 60 tablet 3       Allergies:    Review of patient's allergies indicates no known allergies.    Family History:    No family history on file.    Social History:    Works up to 36 hours a week.  Lives with wife. Sober for 4 months.     Social History     Social History     Marital status:      Spouse name: N/A     Number of children: N/A     Years of education: N/A     Social History Main Topics     Smoking status: Former Smoker     Types: Cigarettes, Cigars     Smokeless tobacco: Never Used     Alcohol use Yes      Comment: Quit drinking Feb 2018     Drug use: No     Sexual activity: Not Asked     Other Topics Concern     None     Social History Narrative       Physical Exam:    /72  Pulse 62  Temp 97.6  F (36.4  C) (Oral)  Resp 16  Ht 1.721 m (5' 7.75\")  Wt 82.3 kg (181 lb 8 oz)  SpO2 99%  BMI 27.8 kg/m2     GENERAL APPEARANCE: healthy, alert and no distress  PSYCHIATRIC: mentation appears normal and affect normal.  EYES: No jaundice.  SKIN: No jaundice.   RESP: lungs clear to auscultation - no rales, rhonchi or wheezes  CARDIOVASCULAR: regular rates and rhythm, normal S1 S2, no S3 or S4 and no murmur  ABDOMEN:  Tender to palpation in RUQ. bowel sounds normal.  MUSCULOSKELETAL: 1+ edema at the ankle    Laboratory Studies:    Lab Results   Component Value Date    HGB 12.7 05/29/2018     Lab Results   Component Value Date    PLT 94 05/29/2018     Lab Results   Component " Value Date    WBC 3.0 05/29/2018       Lab Results   Component Value Date    INR 1.53 05/29/2018       Lab Results   Component Value Date    PROTTOTAL 6.9 05/29/2018      Lab Results   Component Value Date    ALBUMIN 2.6 05/29/2018     Lab Results   Component Value Date    BILITOTAL 2.2 05/29/2018     Lab Results   Component Value Date    ALKPHOS 299 05/29/2018     Lab Results   Component Value Date    AST 79 05/29/2018     Lab Results   Component Value Date    ALT 64 05/29/2018       Lab Results   Component Value Date    CR 0.58 05/29/2018     Lab Results   Component Value Date    BUN 12 05/29/2018       Alpha Fetoprotein   Date Value Ref Range Status   05/29/2018 4.0 0 - 8 ug/L Final     Comment:     Assay Method:  Chemiluminescence using Siemens Centaur XP       Imaging:     I reviewed patient's MRI from 5/24/2018. It showed a 3.6 cm HCC in hepatic segment 8, and smaller lesions in hepatic segment 7 not yet diagnostic for HCC. Evidence of cirrhosis and portal hypertension. I reviewed CT chest 5/24/2018 and nuclear medicine bone scan of 5/24/2018 and are negative for metastatic disease.    ASSESSMENT:      Loy Limon is a 63 year old male who presents with diagnosis of HCC on a background of Etoh cirrhosis.    Child-Ivey score: B  Native MELD score: 14  ECOG performance status: 0-1    I believe the patient is a candidate for a transarterial chemoembolization procedure.     I showed patient the imaging and discussed the findings. I discussed with patient that the goal of the procedure is disease control with a survival benefit rather than a cure given the nature of the disease, but that sometimes lesions will be cured in this manner. Alternatives were reviewed, including surgery, but the patient is not a surgical candidate.    I explained that the chemoembolization is performed under conscious sedation. We discussed what will happen before, during and after the procedure; what to expect in the post procedure  recovery period; and what the follow-up will be. We discussed the risks of the procedure which include, but are not limited to, vascular injury causing bleeding or occlusion of blood vessels, groin hematoma, infection, liver failure, and non-target embolization. I explained that sometimes more than one treatment session is needed to gain control of the tumors, particularly with larger tumors. Risks are increased the greater the deviation from normal lab values, especially albumin and total bilirubin, and also the larger the area we must treat. We also discussed the post-embolization syndrome which is likely and consists of varying degrees of pain, nausea/vomiting, malaise, fever, and elevated white blood cell counts for up to 2 weeks following the procedure. The patient also understands that new tumors may develop elsewhere given the nature of the disease. Many patients go home the next day, but occasionally circumstances are such that a longer admission may be required. I also informed the patient that I will be assisted during the procedure by a fellow and/or resident, and that sometimes a medical student may observe. All of the patient's questions were answered and the patient agreed to proceed.     PLAN:    We will schedule the patient for the chemoembolization procedure.  Will consider adjunctive ablation at a later date, depending on initial chemoembolization response.      A total of 45 minutes was spent in care for the patient, of which >50% was spent in counseling and co-ordination of care.    It was a pleasure seeing the patient.     Signed,    Thai Cortez MD & Debora Del Rosario MD   Department of Interventional Radiology  [facility]      I, Dr Debora Del Rosario, was present with the fellow during the history and exam. I discussed the case with the fellow and agree with the findings as documented in the assessment and plan.      CC  Patient Care Team:  [facility], Oncology, MD as PCP  - General (Radiology)  Dario Anguiano MD as MD (Vascular and Interventional Radiology)  David Champagne MD (Gastroenterology)  JOSE SCOTT

## 2018-06-04 ENCOUNTER — TELEPHONE (OUTPATIENT)
Dept: INTERVENTIONAL RADIOLOGY/VASCULAR | Facility: CLINIC | Age: 63
End: 2018-06-04

## 2018-06-04 DIAGNOSIS — C22.0 HCC (HEPATOCELLULAR CARCINOMA) (H): Primary | ICD-10-CM

## 2018-06-04 NOTE — TELEPHONE ENCOUNTER
Called a left a  for Kaylene, pt's daughter regarding his TACE procedure    Informed her that we have him scheduled for Weds 6/13. Check in time is at 830am for a 10am. Prep reviewed with her however would like for her to return my call so that we can go further in detail.     Will send out letter, however I was asked to draw up a letter indicating that he'll need time off post treatment and that if his wife will also be allowed to take work off to be with him at the time of treatment.     Left direct line for her to return my call.     Izabela Nolen RN, BSN  Interventional Radiology Nurse Coordinator   Phone: [phone]

## 2018-06-05 ENCOUNTER — TELEPHONE (OUTPATIENT)
Dept: INTERVENTIONAL RADIOLOGY/VASCULAR | Facility: CLINIC | Age: 63
End: 2018-06-05

## 2018-06-05 NOTE — TELEPHONE ENCOUNTER
Called and was able to get a hold of Kaylene again.   Informed her of pt's father's information again.     She states that she was not able to listen to my VM yet. I informed her of appt details again and will send a letter to his address.   Once again informed her that we will perform his TACE on  Weds 6/13.     Informed her that I did give the information and prep packet to her sister in law. I will also mail a letter with appt details as well.    She agrees to plan.     Izabela Nolen RN, BSN  Interventional Radiology Nurse Coordinator   Phone: [phone]

## 2018-06-06 DIAGNOSIS — Z79.899 OTHER LONG TERM (CURRENT) DRUG THERAPY: ICD-10-CM

## 2018-06-06 DIAGNOSIS — K74.60 CIRRHOSIS (H): ICD-10-CM

## 2018-06-06 DIAGNOSIS — M89.9 DISORDER OF BONE: ICD-10-CM

## 2018-06-06 DIAGNOSIS — C22.0 HEPATOCELLULAR CARCINOMA (H): ICD-10-CM

## 2018-06-06 DIAGNOSIS — Z12.5 ENCOUNTER FOR SCREENING FOR MALIGNANT NEOPLASM OF PROSTATE: ICD-10-CM

## 2018-06-06 NOTE — TELEPHONE ENCOUNTER
Spoke to pharmacy who stated they do have patient's part D insurance and processed the medication but has a high co-pay due to deductible. Per tech, they will contact patient right away regarding the high co-pay.

## 2018-06-06 NOTE — TELEPHONE ENCOUNTER
Central Prior Authorization Team   Phone: [phone]      PA Initiation via phone    Medication: Xifaxan 550mg-PA Initiated  Insurance Company: [employer] - Phone [phone] Fax [phone]  Pharmacy Filling the Rx: [facility] - [place] - [place], [place] - [address]  Filling Pharmacy Phone: [phone]  Filling Pharmacy Fax: [phone]  Start Date: 6/6/2018    Turn around time is 24-72 hours. Ref# [id number].

## 2018-06-06 NOTE — Clinical Note
Manuel Palencia,   Pt needs to be set up for these evaluation appts/tests. The evaluation class will need to be scheduled as private as the pt is Danish speaking. Additionally, pt needs his follow up with Dr. Carballo scheduled for August.   These eval appts/tests don't need to be completed until the follow up with Dr. Carballo. Labs and test should be done prior to referral appts. Pt's daughter's number is the contact number, she is english speaking and aware of this plan.   Please let me know if you have any questions.   Thanks a lot,  Noelle

## 2018-06-06 NOTE — PROGRESS NOTES
Per selection conference, orders placed for transplant evaluation.     Attempted to reach pt to update him of above plan, spoke with pt's daughter who will update him.     Message routed to  to schedule appts.

## 2018-06-08 NOTE — TELEPHONE ENCOUNTER
Prior Authorization Approval    Authorization Effective Date: 6/7/2018  Authorization Expiration Date: 6/6/2020  Medication: Xifaxan 550mg-APPROVED  Approved Dose/Quantity:  Reference #:     Insurance Company: [employer] - Phone [phone] Fax [phone]  Expected CoPay:       CoPay Card Available:      Foundation Assistance Needed:    Which Pharmacy is filling the prescription (Not needed for infusion/clinic administered): [facility] - [place], [place] - [address]  Pharmacy Notified: Yes  Patient Notified: No    Pharmacy was notified.

## 2018-06-11 ENCOUNTER — TELEPHONE (OUTPATIENT)
Dept: INTERVENTIONAL RADIOLOGY/VASCULAR | Facility: CLINIC | Age: 63
End: 2018-06-11

## 2018-06-12 ENCOUNTER — TELEPHONE (OUTPATIENT)
Dept: TRANSPLANT | Facility: CLINIC | Age: 63
End: 2018-06-12

## 2018-06-12 NOTE — TELEPHONE ENCOUNTER
Daughter has confirmed eval June 18 and 19   And what other dates are needed to finish veal  Appointments made and schedule has been sent

## 2018-06-13 ENCOUNTER — APPOINTMENT (OUTPATIENT)
Dept: INTERVENTIONAL RADIOLOGY/VASCULAR | Facility: CLINIC | Age: 63
End: 2018-06-13
Attending: RADIOLOGY
Payer: MEDICARE

## 2018-06-13 ENCOUNTER — APPOINTMENT (OUTPATIENT)
Dept: MEDSURG UNIT | Facility: CLINIC | Age: 63
End: 2018-06-13
Attending: RADIOLOGY
Payer: MEDICARE

## 2018-06-13 ENCOUNTER — PRE VISIT (OUTPATIENT)
Dept: UROLOGY | Facility: CLINIC | Age: 63
End: 2018-06-13

## 2018-06-13 ENCOUNTER — TELEPHONE (OUTPATIENT)
Dept: TRANSPLANT | Facility: CLINIC | Age: 63
End: 2018-06-13

## 2018-06-13 ENCOUNTER — HOSPITAL ENCOUNTER (OUTPATIENT)
Facility: CLINIC | Age: 63
Setting detail: OBSERVATION
Discharge: HOME | End: 2018-06-14
Attending: RADIOLOGY | Admitting: INTERNAL MEDICINE
Payer: MEDICARE

## 2018-06-13 DIAGNOSIS — C22.0 HCC (HEPATOCELLULAR CARCINOMA) (H): ICD-10-CM

## 2018-06-13 LAB
ALBUMIN SERPL-MCNC: 2.5 G/DL (ref 3.4–5)
ALP SERPL-CCNC: 265 U/L (ref 40–150)
ALT SERPL W P-5'-P-CCNC: 55 U/L (ref 0–70)
ANION GAP SERPL CALCULATED.3IONS-SCNC: 7 MMOL/L (ref 3–14)
AST SERPL W P-5'-P-CCNC: 64 U/L (ref 0–45)
BILIRUB DIRECT SERPL-MCNC: 1.3 MG/DL (ref 0–0.2)
BILIRUB SERPL-MCNC: 2.6 MG/DL (ref 0.2–1.3)
BUN SERPL-MCNC: 13 MG/DL (ref 7–30)
CALCIUM SERPL-MCNC: 8.3 MG/DL (ref 8.5–10.1)
CHLORIDE SERPL-SCNC: 110 MMOL/L (ref 94–109)
CO2 SERPL-SCNC: 24 MMOL/L (ref 20–32)
CREAT SERPL-MCNC: 0.5 MG/DL (ref 0.66–1.25)
ERYTHROCYTE [DISTWIDTH] IN BLOOD BY AUTOMATED COUNT: 14.3 % (ref 10–15)
GFR SERPL CREATININE-BSD FRML MDRD: >90 ML/MIN/1.7M2
GLUCOSE SERPL-MCNC: 110 MG/DL (ref 70–99)
HCT VFR BLD AUTO: 36.8 % (ref 40–53)
HGB BLD-MCNC: 12.8 G/DL (ref 13.3–17.7)
INR PPP: 1.46 (ref 0.86–1.14)
MCH RBC QN AUTO: 33.3 PG (ref 26.5–33)
MCHC RBC AUTO-ENTMCNC: 34.8 G/DL (ref 31.5–36.5)
MCV RBC AUTO: 96 FL (ref 78–100)
PLATELET # BLD AUTO: 100 10E9/L (ref 150–450)
POTASSIUM SERPL-SCNC: 4.2 MMOL/L (ref 3.4–5.3)
PROT SERPL-MCNC: 6.8 G/DL (ref 6.8–8.8)
RBC # BLD AUTO: 3.84 10E12/L (ref 4.4–5.9)
SODIUM SERPL-SCNC: 141 MMOL/L (ref 133–144)
WBC # BLD AUTO: 3.4 10E9/L (ref 4–11)

## 2018-06-13 PROCEDURE — 80076 HEPATIC FUNCTION PANEL: CPT | Performed by: RADIOLOGY

## 2018-06-13 PROCEDURE — 99153 MOD SED SAME PHYS/QHP EA: CPT

## 2018-06-13 PROCEDURE — T1013 SIGN LANG/ORAL INTERPRETER: HCPCS | Mod: U3

## 2018-06-13 PROCEDURE — 37243 VASC EMBOLIZE/OCCLUDE ORGAN: CPT

## 2018-06-13 PROCEDURE — 85610 PROTHROMBIN TIME: CPT | Performed by: RADIOLOGY

## 2018-06-13 PROCEDURE — C1769 GUIDE WIRE: HCPCS

## 2018-06-13 PROCEDURE — 36247 INS CATH ABD/L-EXT ART 3RD: CPT

## 2018-06-13 PROCEDURE — 40000065 ZZH STATISTIC EKG NON-CHARGEABLE

## 2018-06-13 PROCEDURE — 25000125 ZZHC RX 250: Performed by: PHYSICIAN ASSISTANT

## 2018-06-13 PROCEDURE — 75774 ARTERY X-RAY EACH VESSEL: CPT | Mod: XS

## 2018-06-13 PROCEDURE — 36248 INS CATH ABD/L-EXT ART ADDL: CPT | Mod: XS

## 2018-06-13 PROCEDURE — 96420 CHEMO IA PUSH TECNIQUE: CPT

## 2018-06-13 PROCEDURE — 25000128 H RX IP 250 OP 636: Performed by: RADIOLOGY

## 2018-06-13 PROCEDURE — 27210908 ZZH NEEDLE CR4

## 2018-06-13 PROCEDURE — 27210995 ZZH RX 272: Performed by: RADIOLOGY

## 2018-06-13 PROCEDURE — 25000128 H RX IP 250 OP 636: Performed by: PHYSICIAN ASSISTANT

## 2018-06-13 PROCEDURE — C1887 CATHETER, GUIDING: HCPCS

## 2018-06-13 PROCEDURE — 27210732 ZZH ACCESSORY CR1

## 2018-06-13 PROCEDURE — G0378 HOSPITAL OBSERVATION PER HR: HCPCS

## 2018-06-13 PROCEDURE — 80048 BASIC METABOLIC PNL TOTAL CA: CPT | Performed by: RADIOLOGY

## 2018-06-13 PROCEDURE — 93010 ELECTROCARDIOGRAM REPORT: CPT | Performed by: INTERNAL MEDICINE

## 2018-06-13 PROCEDURE — C1760 CLOSURE DEV, VASC: HCPCS

## 2018-06-13 PROCEDURE — 25000125 ZZHC RX 250: Performed by: RADIOLOGY

## 2018-06-13 PROCEDURE — 99152 MOD SED SAME PHYS/QHP 5/>YRS: CPT

## 2018-06-13 PROCEDURE — 27210804 ZZH SHEATH CR3

## 2018-06-13 PROCEDURE — 27210780 ZZH KIT CR3

## 2018-06-13 PROCEDURE — 27210905 ZZH KIT CR7

## 2018-06-13 PROCEDURE — 99218 ZZC INITIAL OBSERVATION CARE,LEVL I: CPT | Mod: AI | Performed by: INTERNAL MEDICINE

## 2018-06-13 PROCEDURE — 93005 ELECTROCARDIOGRAM TRACING: CPT

## 2018-06-13 PROCEDURE — 75726 ARTERY X-RAYS ABDOMEN: CPT

## 2018-06-13 PROCEDURE — 40000172 ZZH STATISTIC PROCEDURE PREP ONLY

## 2018-06-13 PROCEDURE — 85027 COMPLETE CBC AUTOMATED: CPT | Performed by: RADIOLOGY

## 2018-06-13 RX ORDER — ONDANSETRON 4 MG/1
4 TABLET, ORALLY DISINTEGRATING ORAL EVERY 6 HOURS PRN
Status: DISCONTINUED | OUTPATIENT
Start: 2018-06-13 | End: 2018-06-14 | Stop reason: HOSPADM

## 2018-06-13 RX ORDER — FENTANYL CITRATE 50 UG/ML
25-50 INJECTION, SOLUTION INTRAMUSCULAR; INTRAVENOUS EVERY 5 MIN PRN
Status: DISCONTINUED | OUTPATIENT
Start: 2018-06-13 | End: 2018-06-13 | Stop reason: HOSPADM

## 2018-06-13 RX ORDER — NADOLOL 20 MG/1
20 TABLET ORAL DAILY
Status: DISCONTINUED | OUTPATIENT
Start: 2018-06-14 | End: 2018-06-14 | Stop reason: HOSPADM

## 2018-06-13 RX ORDER — SODIUM CHLORIDE 9 MG/ML
INJECTION, SOLUTION INTRAVENOUS CONTINUOUS
Status: DISCONTINUED | OUTPATIENT
Start: 2018-06-13 | End: 2018-06-14 | Stop reason: HOSPADM

## 2018-06-13 RX ORDER — NALOXONE HYDROCHLORIDE 0.4 MG/ML
.1-.4 INJECTION, SOLUTION INTRAMUSCULAR; INTRAVENOUS; SUBCUTANEOUS
Status: DISCONTINUED | OUTPATIENT
Start: 2018-06-13 | End: 2018-06-13 | Stop reason: HOSPADM

## 2018-06-13 RX ORDER — ONDANSETRON 2 MG/ML
4 INJECTION INTRAMUSCULAR; INTRAVENOUS EVERY 6 HOURS PRN
Status: DISCONTINUED | OUTPATIENT
Start: 2018-06-13 | End: 2018-06-14 | Stop reason: HOSPADM

## 2018-06-13 RX ORDER — ONDANSETRON 2 MG/ML
4 INJECTION INTRAMUSCULAR; INTRAVENOUS ONCE
Status: COMPLETED | OUTPATIENT
Start: 2018-06-13 | End: 2018-06-13

## 2018-06-13 RX ORDER — AMPICILLIN AND SULBACTAM 2; 1 G/1; G/1
3 INJECTION, POWDER, FOR SOLUTION INTRAMUSCULAR; INTRAVENOUS
Status: COMPLETED | OUTPATIENT
Start: 2018-06-13 | End: 2018-06-13

## 2018-06-13 RX ORDER — IODIXANOL 320 MG/ML
250 INJECTION, SOLUTION INTRAVASCULAR ONCE
Status: COMPLETED | OUTPATIENT
Start: 2018-06-13 | End: 2018-06-13

## 2018-06-13 RX ORDER — SODIUM CHLORIDE 9 MG/ML
INJECTION, SOLUTION INTRAVENOUS CONTINUOUS
Status: DISCONTINUED | OUTPATIENT
Start: 2018-06-13 | End: 2018-06-13 | Stop reason: HOSPADM

## 2018-06-13 RX ORDER — LIDOCAINE 40 MG/G
CREAM TOPICAL
Status: DISCONTINUED | OUTPATIENT
Start: 2018-06-13 | End: 2018-06-13 | Stop reason: HOSPADM

## 2018-06-13 RX ORDER — SCOLOPAMINE TRANSDERMAL SYSTEM 1 MG/1
1 PATCH, EXTENDED RELEASE TRANSDERMAL ONCE
Status: COMPLETED | OUTPATIENT
Start: 2018-06-13 | End: 2018-06-13

## 2018-06-13 RX ORDER — PROCHLORPERAZINE MALEATE 5 MG
5 TABLET ORAL EVERY 6 HOURS PRN
Status: DISCONTINUED | OUTPATIENT
Start: 2018-06-13 | End: 2018-06-14 | Stop reason: HOSPADM

## 2018-06-13 RX ORDER — PROCHLORPERAZINE 25 MG
12.5 SUPPOSITORY, RECTAL RECTAL EVERY 12 HOURS PRN
Status: DISCONTINUED | OUTPATIENT
Start: 2018-06-13 | End: 2018-06-14 | Stop reason: HOSPADM

## 2018-06-13 RX ORDER — FLUMAZENIL 0.1 MG/ML
0.2 INJECTION, SOLUTION INTRAVENOUS
Status: DISCONTINUED | OUTPATIENT
Start: 2018-06-13 | End: 2018-06-13 | Stop reason: HOSPADM

## 2018-06-13 RX ORDER — NITROGLYCERIN 5 MG/ML
100-500 VIAL (ML) INTRAVENOUS
Status: COMPLETED | OUTPATIENT
Start: 2018-06-13 | End: 2018-06-13

## 2018-06-13 RX ORDER — TRAMADOL HYDROCHLORIDE 50 MG/1
50 TABLET ORAL EVERY 6 HOURS PRN
Status: DISCONTINUED | OUTPATIENT
Start: 2018-06-13 | End: 2018-06-14 | Stop reason: HOSPADM

## 2018-06-13 RX ORDER — NITROGLYCERIN 5 MG/ML
200 VIAL (ML) INTRAVENOUS
Status: COMPLETED | OUTPATIENT
Start: 2018-06-13 | End: 2018-06-13

## 2018-06-13 RX ORDER — NALOXONE HYDROCHLORIDE 0.4 MG/ML
.1-.4 INJECTION, SOLUTION INTRAMUSCULAR; INTRAVENOUS; SUBCUTANEOUS
Status: DISCONTINUED | OUTPATIENT
Start: 2018-06-13 | End: 2018-06-14 | Stop reason: HOSPADM

## 2018-06-13 RX ADMIN — LIDOCAINE HYDROCHLORIDE 10 ML: 10 INJECTION, SOLUTION EPIDURAL; INFILTRATION; INTRACAUDAL; PERINEURAL at 14:20

## 2018-06-13 RX ADMIN — IODIXANOL 110 ML: 320 INJECTION, SOLUTION INTRAVASCULAR at 16:57

## 2018-06-13 RX ADMIN — AMPICILLIN SODIUM AND SULBACTAM SODIUM 3 G: 2; 1 INJECTION, POWDER, FOR SOLUTION INTRAMUSCULAR; INTRAVENOUS at 09:47

## 2018-06-13 RX ADMIN — FENTANYL CITRATE 50 MCG: 50 INJECTION INTRAMUSCULAR; INTRAVENOUS at 14:45

## 2018-06-13 RX ADMIN — FENTANYL CITRATE 50 MCG: 50 INJECTION INTRAMUSCULAR; INTRAVENOUS at 16:00

## 2018-06-13 RX ADMIN — NITROGLYCERIN 200 MCG: 5 INJECTION, SOLUTION INTRAVENOUS at 16:02

## 2018-06-13 RX ADMIN — HEPARIN SODIUM 5000 UNITS: 1000 INJECTION, SOLUTION INTRAVENOUS; SUBCUTANEOUS at 14:44

## 2018-06-13 RX ADMIN — SCOLOPAMINE TRANSDERMAL SYSTEM 1 PATCH: 1 PATCH, EXTENDED RELEASE TRANSDERMAL at 09:49

## 2018-06-13 RX ADMIN — MIDAZOLAM 1 MG: 1 INJECTION INTRAMUSCULAR; INTRAVENOUS at 16:00

## 2018-06-13 RX ADMIN — DOXORUBICIN HYDROCHLORIDE: 2 INJECTION, POWDER, LYOPHILIZED, FOR SOLUTION INTRAVENOUS at 14:38

## 2018-06-13 RX ADMIN — ONDANSETRON 4 MG: 2 INJECTION INTRAMUSCULAR; INTRAVENOUS at 16:10

## 2018-06-13 RX ADMIN — SODIUM CHLORIDE: 9 INJECTION, SOLUTION INTRAVENOUS at 09:42

## 2018-06-13 RX ADMIN — HYDROCORTISONE SODIUM SUCCINATE 100 MG: 100 INJECTION, POWDER, FOR SOLUTION INTRAMUSCULAR; INTRAVENOUS at 09:43

## 2018-06-13 RX ADMIN — SODIUM CHLORIDE: 9 INJECTION, SOLUTION INTRAVENOUS at 16:54

## 2018-06-13 RX ADMIN — MIDAZOLAM 1 MG: 1 INJECTION INTRAMUSCULAR; INTRAVENOUS at 14:45

## 2018-06-13 RX ADMIN — NITROGLYCERIN 200 MCG: 5 INJECTION, SOLUTION INTRAVENOUS at 16:19

## 2018-06-13 NOTE — BRIEF OP NOTE
Interventional Radiology Brief Post Procedure Note    Procedure: IR CHEMO EMBOLIZATION    Proceduralist: Debora Del Rosario MD    Assistant: Nick Ruffin MD    Time Out: Prior to the start of the procedure and with procedural staff participation, I verbally confirmed the patient s identity using two indicators, relevant allergies, that the procedure was appropriate and matched the consent or emergent situation, and that the correct equipment/implants were available. Immediately prior to starting the procedure I conducted the Time Out with the procedural staff and re-confirmed the patient s name, procedure, and site/side. (The Joint Commission universal protocol was followed.)  Yes    Medications   Medication Event Details Admin User Admin Time   lidocaine 1 % 1-30 mL Medication Given by Other Dose: 10 mL; Route: Intradermal; Comment: Ag Benavides RN 6/13/2018  2:20 PM   mitoMYcin (MUTAMYCIN) 10 mg, DOXOrubicin HCl (ADRIAMYCIN) 50 mg, sterile water (preservative free) 1.5 mL in sterile contrast (VISIPAQUE) 10 mL CHEMOTHERAPY Medication Given by Other Route: Other; Scheduled Time: 10:15 AM; Comment: Ag Benavides RN 6/13/2018  2:38 PM   heparin 5,000 units in 0.9% sodium chloride 1000 mL Medication New Bag by Other Clinician Dose: 5,000 Units; Route: TABLE SOLN; Comment: 2 bags - table solution - Ag Maldonado RN 6/13/2018  2:44 PM   midazolam (VERSED) injection 0.5-1 mg Medication Given Dose: 1 mg; Route: Ag Bledsoe RN 6/13/2018  2:45 PM   fentaNYL (PF) (SUBLIMAZE) injection 25-50 mcg Medication Given Dose: 50 mcg; Route: Intravenous Ag Eddy RN 6/13/2018  2:45 PM   midazolam (VERSED) injection 0.5-1 mg Medication Given Dose: 1 mg; Route: Ag Bledsoe RN 6/13/2018  4:00 PM   fentaNYL (PF) (SUBLIMAZE) injection 25-50 mcg Medication Given Dose: 50 mcg; Route: Intravenous Nunu  DALLIN Luong 6/13/2018  4:00 PM   nitroGLYcerin injection 100-500 mcg Medication Given by Other Dose: 200 mcg; Route: INTRA-ARTERIAL; Comment: Ag Benavides RN 6/13/2018  4:02 PM   ondansetron (ZOFRAN) injection 4 mg Medication Given Dose: 4 mg; Route: Intravenous; Scheduled Time:  9:00 AM Ag Eddy RN 6/13/2018  4:10 PM   nitroGLYcerin injection 200 mcg Medication Given by Other Clinician Dose: 200 mcg; Route: INTRA-ARTERIAL Jacqueline Hanna RN 6/13/2018  4:19 PM   sodium chloride 0.9% infusion Medication New Bag Rate: 75 mL/hr; Route: Intravenous; Scheduled Time:  4:54 PM Jacqueline Hanna RN 6/13/2018  4:54 PM   iodixanol (VISIPAQUE 320) injection 250 mL Medication Given Dose: 110 mL; Route: Tube; Scheduled Time:  2:00 PM Richard White ARRT 6/13/2018  4:57 PM       Sedation: IR Nurse Monitored Care   Post Procedure Summary:  Prior to the start of the procedure and with procedural staff participation, I verbally confirmed the patient s identity using two indicators, relevant allergies, that the procedure was appropriate and matched the consent or emergent situation, and that the correct equipment/implants were available. Immediately prior to starting the procedure I conducted the Time Out with the procedural staff and re-confirmed the patient s name, procedure, and site/side. (The Joint Commission universal protocol was followed.)  Yes       Sedatives: Fentanyl and Midazolam (Versed)    Vital signs, airway and pulse oximetry were monitored and remained stable throughout the procedure and sedation was maintained until the procedure was complete.  The patient was monitored by staff until sedation discharge criteria were met.    Patient tolerance: Patient tolerated the procedure well with no immediate complications.    Time of sedation in minutes: 160 minutes from beginning to end of physician one to one monitoring.          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 Some  residual disease remains along the lateral aspect of the tumor due to lack of discrete feeding vessels. Will place to ablate the remaining tumor at a later date.    Estimated Blood Loss: None    Fluoroscopy Time: 25.5 minute(s)    SPECIMENS: None    Complications: 1. None     Condition: Stable    Plan: Patient to observation overnight for monitoring of postembolization syndrome. No NPO needed. CT abdomen ordered for tomorrow morning. IR will see tomorrow am prior to anticipated discharge.    Comments: See dictated procedure note for full details.    Nick Ruffin MD

## 2018-06-13 NOTE — LETTER
UNIT 6D OBSERVATION [facility]  [address]  [facility] [address]  Phone: [phone]  Fax: [phone]    June 14, 2018        Drea Kruse  [address]   [facility] [address]      To whom it may concern:    RE: Loy Limon and significant other, Drea Kruse.     Mr. Bryanna Rodriguez was seen in the hospital on 6/14/18. Please excuse his significant other, Drea Kruse, from work on 6/14/18 as she was required to be present for medical reasons.     Please contact me for questions or concerns.      Sincerely,          Noelle Cheung PA-C  Hematology/Oncology

## 2018-06-13 NOTE — IP AVS SNAPSHOT
Unit 6D Observation [address] [address]    Phone:  [phone]    Fax:  [phone]                                       After Visit Summary   6/13/2018    Loy Limon    MRN: [id number]           After Visit Summary Signature Page     I have received my discharge instructions, and my questions have been answered. I have discussed any challenges I see with this plan with the nurse or doctor.    ..........................................................................................................................................  Patient/Patient Representative Signature      ..........................................................................................................................................  Patient Representative Print Name and Relationship to Patient    ..................................................               ................................................  Date                                            Time    ..........................................................................................................................................  Reviewed by Signature/Title    ...................................................              ..............................................  Date                                                            Time

## 2018-06-13 NOTE — LETTER
UNIT 6D OBSERVATION [facility]  [address]  [facility] [address]  Phone: [phone]  Fax: [phone]    June 14, 2018        Loy Limon  [address]   [facility] [address]          To whom it may concern:    RE: Loy Limon    Patient may return to work one week after his procedure, on 6/21/18, with the following:  No working or lifting restrictions. He has appointments scheduled on 6/18/18 and 6/20/18. The Interventional Radiology team will be scheduling a repeat procedure in 1-2 weeks (they will contact the patient and his family regarding this date and time).    Please contact me for questions or concerns at [phone]      Sincerely,        Noelle Cheung PA-C  Hematology/Oncology

## 2018-06-13 NOTE — TELEPHONE ENCOUNTER
MEDICAL RECORDS REQUEST   [facility]  Urology Clinic  [address] [place] [address]  PHONE: [phone]  Fax: [phone]        FUTURE VISIT INFORMATION                                                   Loy Limon, : 1955 scheduled for future visit at [facility]    APPOINTMENT INFORMATION:    Date: 2018    Provider:  KERI GALLEGOS    Reason for Visit/Diagnosis: TRANSPLANT CLEARANCE    REFERRAL INFORMATION:    Referring provider:  SELF    Specialty: SELF    Referring providers clinic:  SELF    Clinic contact number:  SELF    RECORDS REQUESTED FOR VISIT                                                     NOTES  STATUS/DETAILS   OFFICE NOTE from referring provider  yes   OFFICE NOTE from other specialist  no   DISCHARGE SUMMARY from hospital  no   DISCHARGE REPORT from the ER  no   OPERATIVE REPORT  no   MEDICATION LIST  yes       PRE-VISIT CHECKLIST      Record collection complete Yes  ALL RECORDS IN EPIC.. CDK   Appointment appropriately scheduled           (right time/right provider) Yes   Joint diagnostic appointment coordinated correctly          (ensure right order & amount of time) Yes   MyChart activation Yes   Questionnaire complete If no, please explain IN PROCESS     Completed by: Sofi Herrera

## 2018-06-13 NOTE — PROGRESS NOTES
Patient Name: Loy Limon  Medical Record Number: [id number]  Today's Date: 6/13/2018    Procedure: Classic Transarterial Chemoembolization  Proceduralists: Dr. Debora Del Rosario and Dr. Nick Ruffin    Sedation start time: 1410  Sedation end time: 1650  Sedation medications administered: Fentanyl 100 mcg, Versed 2 mg IV   Sedation time: 2 hrs, 40 minutes    Procedure start time: 1410  Puncture time: 1417  Procedure end time: 1650    Report given to: RN, Unit 6D  Vietnamese Interpreters: Becky Reyes, CMI (Vietnamese) Eliud     Other Notes: Pt arrived to IR Room #4 from Unit 2A. Consent confirmed with patient. Pt denies any questions or concerns regarding procedure. Pt positioned supine and monitored per IR conscious sedation protocol. Transarterial chemoembolization done. Pt tolerated procedure without any noted complications. Pt transferred to Unit 6D, post-procedure.    Handoff report received from DALLAS Eddy RN. Assumed care of Patient at 1610.  Post procedure; right femoral arteriotomy was closed with a 5 fr Mynx device. The right groin is without bleeding, bruising or hematoma. Patient currently denies having pain or discomfort.     Jacqueline Hanna RN

## 2018-06-13 NOTE — TELEPHONE ENCOUNTER
Daughter has confirmed appointment with Dr Carballo August 14 at 8:30      Appointment slip has been mailed

## 2018-06-13 NOTE — PROVIDER NOTIFICATION
Heme/Onc paged regarding whether CT scan was tonight or tomorrow. Per provider, CT scan will be tomorrow morning.

## 2018-06-13 NOTE — PROGRESS NOTES
Interventional Radiology Pre-Procedure Sedation Assessment   Time of Assessment: 11:53 AM    Expected Level: Moderate Sedation    Indication: Sedation is required for the following type of Procedure: Arterial    Sedation and procedural consent: Risks, benefits and alternatives were discussed with Patient    PO Intake: Appropriately NPO for procedure    ASA Class: Class 3 - SEVERE SYSTEMIC DISEASE, DEFINITE FUNCTIONAL LIMITATIONS.    Mallampati: Grade 3:  Soft palate visible, posterior pharyngeal wall not visible    Lungs: Lungs Clear with good breath sounds bilaterally    Heart: Normal heart sounds and rate    History and physical reviewed and no updates needed. I have reviewed the lab findings, diagnostic data, medications, and the plan for sedation. I have determined this patient to be an appropriate candidate for the planned sedation and procedure and have reassessed the patient IMMEDIATELY PRIOR to sedation and procedure.    Nick Ruffin MD

## 2018-06-13 NOTE — IP AVS SNAPSHOT
MRN:[id number]                      After Visit Summary   6/13/2018    Loy Limon    MRN: [id number]           Thank you!     Thank you for choosing [facility] for your care. Our goal is always to provide you with excellent care. Hearing back from our patients is one way we can continue to improve our services. Please take a few minutes to complete the written survey that you may receive in the mail after you visit with us. Thank you!        Patient Information     Date Of Birth          1955        About your hospital stay     You were admitted on:  June 13, 2018 You last received care in the:  Unit 6D Observation [facility]    You were discharged on:  June 14, 2018        Reason for your hospital stay       You were admitted after a TACE procedure. You tolerated this well. Your labs and vitals are stable and you are safe to discharge home. Please use your home supply of Tramadol for pain control as needed.                  Who to Call     For medical emergencies, please call 911.  For non-urgent questions about your medical care, please call your primary care provider or clinic, None          Attending Provider     Provider Specialty    Debora Ac MD Vascular and Interventional Radiology    Jesus Rosen MD Oncology       Primary Care Provider    Oncology [facility] Therapy, MD       When to contact your care team       Call the [facility] 24-hour triage line at [phone] or [phone] for temp >100.4, uncontrolled nausea/vomiting/diarrhea/constipation, unrelieved pain, bleeding not relieved with pressure, dizziness, chest pain, shortness of breath, loss of consciousness, and any new or concerning symptoms.     Call [phone] and ask for the care coordinator that works with your oncologist if you have questions about scans, appointments, hospital follow-up, or other concerns.                  After Care Instructions     Activity       Your  activity upon discharge: Regular activity as tolerated.            Diet       Follow this diet upon discharge: Regular diet as tolerated.                  Follow-up Appointments     Adult [facility] Follow-up and recommended labs and tests       -- IR will arrange a follow-up in about 1 month with an abdominal MRI prior.     Appointments on [facility] and/or [facility] (with [facility] or [facility] provider or service). Call [phone] if you haven't heard regarding these appointments within 7 days of discharge.                  Your next 10 appointments already scheduled     Jun 18, 2018  7:00 AM CDT   LAB with  LAB    [facility] ([facility])    [address] [address] [phone]           Please do not eat 10-12 hours before your appointment if you are coming in fasting for labs on lipids, cholesterol, or glucose (sugar). This does not apply to pregnant women. Water, hot tea and black coffee (with nothing added) are okay. Do not drink other fluids, diet soda or chew gum.            Jun 18, 2018  7:20 AM CDT   ecg with  CV EKG   [facility] Xray ([facility])    [address] [address] [phone]            Jun 18, 2018  8:15 AM CDT   XR CHEST 2 VIEWS with XR1   [facility] Xray ([facility])    [address] [address] [phone]           Please bring a list of your current medicines to your exam. (Include vitamins, minerals and over-thecounter medicines.) Leave your valuables at home.  Tell your doctor if there is a chance you may be pregnant.  You do not need to do anything special for this exam.            Jun 18, 2018  8:30 AM CDT   DX HIP/PELVIS/SPINE with DX1   [facility] Dexa ([facility])    [address] [address]    [phone]           Please do not take any of the following 24 hours prior to the day of your exam: vitamins, calcium tablets, antacids.  If possible, please wear clothes without metal (snaps, zippers). A sweatsuit works well.            Jun 18, 2018  9:00 AM CDT   US ABDOMEN/PELVIS DUPLEX COMPLETE with UCUS3   [facility] US ([facility])    [address] [facility]  1st Floor  [facility] [id number] [phone]           Please bring a list of your medicines (including vitamins, minerals and over-the-counter drugs). Also, tell your doctor about any allergies you may have. Wear comfortable clothes and leave your valuables at home.  Adults: No eating or drinking for 8 hours before the exam. You may take medicine with a small sip of water.  Children: - Children 6+ years: No food or drink for 6 hours before exam. - Children 1-5 years: No food or drink for 4 hours before exam. - Infants, breast-fed: may have breast milk up to 2 hours before exam. - Infants, formula: may have bottle until 4 hours before exam.  Please call the Imaging Department at your exam site with any questions.            Jun 18, 2018 10:00 AM CDT   PFT VISIT with  PFL D   [facility] Pulmonary Function Testing ([facility])    [address] [facility]  3rd Floor  [facility] [id number] [phone]            Jun 20, 2018 10:00 AM CDT   Transplant Class-Liver with  TRANSPLANT CLASS   [facility] Solid Organ Transplant ([facility])    [address] [facility]  [address]  [facility] [address] [phone]            Jun 20, 2018  2:00 PM CDT   Ech Dobutamine Stress Test with [id number]   [facility] [place],  Echocardiography ([facility])    [address] [facility] [address] [phone]           1.  Please bring or wear a comfortable two-piece outfit and walking shoes. 2.  Stop eating 3 hours before the test. You  may drink water or juice. 3.  Stop all caffeine 12 hours before the test. This includes coffee, tea, soda pop, chocolate and certain medicines (such as Anacin and Excederin). Also avoid decaf coffee and tea, as these contain small amounts of caffeine. 4.  No alcohol, smoking or use of other tobacco products for 12 hours before the test. 5.  Refer to your provider instructions to see if you need to stop any medications (such as beta-blockers or nitrates) for this test. 6.  For patients with diabetes: -   If you take insulin, call your diabetes care team. Ask if you should take a   dose the morning of your test. -   If you take diabetes medicine by mouth, don't take it on the morning of your test. Bring it with you to take after the test.  (If you have questions, call your diabetes care team) 7.  When you arrive, please tell us if: -   You have diabetes. -   You have taken Viagra, Cialis or Levitra in the past 48 hours. 8.  For any questions that cannot be answered, please contact the ordering physician            Jul 09, 2018  7:00 AM CDT   (Arrive by 6:45 AM)   New Patient Visit with Mathew Self MD   [facility] Urology and [facility] ([facility])    [address] [place] [address] [phone]            Jul 09, 2018  7:30 AM CDT   (Arrive by 7:15 AM)   NEW LIVER EVALUATION with Magan Castro MD   [facility] Heart [facility] ([facility])    [address] [place] [address] [phone]              Future tests that were ordered for you     **CBC with platelets FUTURE anytime       Last Lab Result: Hemoglobin (g/dL)       Date                     Value                 06/14/2018               11.8 (L)         ----------            **Comprehensive metabolic panel FUTURE anytime                 Further instructions from your care team       Care of groin site:         Remove  "the Band-Aid after 24 hours. If there is minor oozing, apply another Band-aid and remove it after 12 hours.          Do NOT take a bath, or use a hot tub or pool for at least 3 days. You may shower.          It is normal to have a small bruise or lump at the site.         Do not scrub the site.         Do not use lotion or powder near the puncture site for 3 days.         For the first 2 days: Do not stoop or squat. When you cough, sneeze or move your bowels, hold your hand over the puncture site and press gently.         Do not lift more than 10 pounds for at least 3 to 5 days.         For 2 days, do NOT have sex or do any heavy exercise.     If you start bleeding from the site in your groin:  Lie down flat and press firmly on the site.  Call your physician immediately, or, come to the emergency room.      Pending Results     Date and Time Order Name Status Description    6/13/2018 0857 EKG 12-lead, tracing only Preliminary     6/13/2018 0844 IR Chemo Embolization In process             Statement of Approval     Ordered          06/14/18 0926  I have reviewed and agree with all the recommendations and orders detailed in this document.  EFFECTIVE NOW     Approved and electronically signed by:  Noelle Cheung PA-C             Admission Information     Date & Time Provider Department Dept. Phone    6/13/2018 Jesus Rosen MD Unit 6D Observation [facility] [phone]      Your Vitals Were     Blood Pressure Pulse Temperature Respirations Height Weight    124/73 (BP Location: Left arm) 47 97.7  F (36.5  C) (Oral) 16 1.702 m (5' 7\") 81.6 kg (180 lb)    Pulse Oximetry BMI (Body Mass Index)                98% 28.19 kg/m2          MyChart Information     [employer] lets you send messages to your doctor, view your test results, renew your prescriptions, schedule appointments and more. To sign up, go to [email or web address] . Click on \"Log in\" on the left side of the screen, which will take you to the " "Welcome page. Then click on \"Sign up Now\" on the right side of the page.     You will be asked to enter the access code listed below, as well as some personal information. Please follow the directions to create your username and password.     Your access code is: [email or web address]  Expires: 2018  8:03 AM     Your access code will  in 90 days. If you need help or a new code, please call your [facility] clinic or [phone].        Care EveryWhere ID     This is your Care EveryWhere ID. This could be used by other organizations to access your [facility] medical records  [id number]        Equal Access to Services     : Wyatt Pittman, karin cheema, ciara hill, javier rico . So [facility] [phone].    ATENCIÓN: Si habla español, tiene a samaniego disposición servicios gratuitos de asistencia lingüística. Llame al [phone].    We comply with applicable federal civil rights laws and [place] laws. We do not discriminate on the basis of race, color, national origin, age, disability, sex, sexual orientation, or gender identity.               Review of your medicines      CONTINUE these medicines which have NOT CHANGED        Dose / Directions    NADOLOL PO        Dose:  20 mg   Take 20 mg by mouth daily   Refills:  0       omeprazole 20 MG CR capsule   Commonly known as:  priLOSEC        Dose:  20 mg   Take 20 mg by mouth   Refills:  0       propranolol 10 MG tablet   Commonly known as:  INDERAL   Used for:  Cirrhosis (H), Pulmonary nodules        Dose:  10 mg   Take 10 mg by mouth   Refills:  0       traMADol 50 MG tablet   Commonly known as:  ULTRAM        Dose:  50 mg   Take 50 mg by mouth   Refills:  0                Protect others around you: Learn how to safely use, store and throw away your medicines at www.disposemymeds.org.             Medication List: This is a list of all your medications and when to take them. Check marks below indicate " your daily home schedule. Keep this list as a reference.      Medications           Morning Afternoon Evening Bedtime As Needed    NADOLOL PO   Take 20 mg by mouth daily                                omeprazole 20 MG CR capsule   Commonly known as:  priLOSEC   Take 20 mg by mouth                                propranolol 10 MG tablet   Commonly known as:  INDERAL   Take 10 mg by mouth                                traMADol 50 MG tablet   Commonly known as:  ULTRAM   Take 50 mg by mouth

## 2018-06-13 NOTE — H&P
[facility] History and Physical    Loy Limon MRN# [id number]   Age: 63 year old YOB: 1955     Date of Admission:  6/13/2018    Home clinic:   Primary care provider: [facility], Oncology          Assessment and Plan:    63 year old male admitted post TACE for observation.     HCC  S8 3.7 cm OPTN 5B lesion  S7 LIRADS 4 lesions x 2     No evidence of metastatic disease   Underwent TACE to R lobe HCC.   Labs and CT in AM.   Monitor for fever, encephalopathy, bleeding. Post procedure recs per IR Re: diet and mobility ordered.   Not in any pain. Will use Tramadol prn.      Etoh related Cirrhosis. Follows with Dr Carballo. Recently given Rifaximin because of Hepatic Encephalopathy but he has not started it because of high co-pay. He is expecting that his insurance would cover it and then he will start. Unable to tolerate Lactulose.  No evidence of encephalopathy. Monitor.      ETOH Abuse. Heavy drinker for 30 years. Last reported drink in February of 2018-     Pancytopenia, mainly thrombocytopenia- in the setting of cirrhosis and portal HTN and splenomegaly. Cont to observe     Hypertension. On nadolol for portal HTN. Has not started propranolol.    May use home meds nadolol, omeprazole, and tramadol.     Mechanical VTE prophylaxis.  Full code.          Chief Complaint:   TACE    History is obtained from the patient and electronic health record    63 year old male with recent diagnosis of HCC, underwent TACE to R lobe on liver.  He is seen on the floor. He is hungry. Denies any pain, nasuea, vomiting, sob, chest pain, leg pain, groin pain.          Cancer Treatment History:   HCC  S8 3.7 cm OPTN 5B lesion  S7 LIRADS 4 lesions x 2     No evidence of metastatic disease     I recommend IR evaluation for liver directed therapy. He is going to see Dr. Silverman tomorrow  Otherwise we will continue to monitor after that with MRI every 3 months. Will repeat labs at that time as well  Can  re-evaluate for liver transplant if remains sober for at least 6 months     Pulmonary nodule-indeterminate 4 mm nodule in  right lower lobe repeat CT scan in 4 months          Past Medical History:     Past Medical History:   Diagnosis Date     Cancer (H)     hepatocellualr carcinoma     Cirrhosis of liver (H) 5/8/2018     Enlarged prostate      Inguinal hernia      Liver lesion 5/8/2018            Past Surgical History:      Past Surgical History:   Procedure Laterality Date     APPENDECTOMY       COLONOSCOPY      2018            Social History:     Social History   Substance Use Topics     Smoking status: Former Smoker     Packs/day: 0.03     Years: 20.00     Types: Cigarettes, Cigars     Smokeless tobacco: Never Used      Comment: Quit smoking Feb 2018, one cigarette after dinner     Alcohol use Yes      Comment: Quit drinking Feb 2018            Family History:   History reviewed. No pertinent family history.  Family history          Immunizations:     There is no immunization history on file for this patient.         Allergies:   No Known Allergies         Medications:       No current facility-administered medications on file prior to encounter.   Current Outpatient Prescriptions on File Prior to Encounter:  NADOLOL PO Take 20 mg by mouth daily   omeprazole (PRILOSEC) 20 MG CR capsule Take 20 mg by mouth   traMADol (ULTRAM) 50 MG tablet Take 50 mg by mouth   propranolol (INDERAL) 10 MG tablet Take 10 mg by mouth              Review of Systems:   The Review of Systems is negative other than noted in the HPI         Physical Exam:   Temp: 97.8  F (36.6  C) Temp src: Oral BP: 122/74 Pulse: (!) 47 Heart Rate: 53 Resp: 12 SpO2: 98 % O2 Device: None (Room air)    Constitutional: Awake, alert, cooperative, no apparent distress, and appears stated age.  Eyes: Lids and lashes normal, pupils equal, round and reactive to light, extra ocular muscles intact, sclera clear, conjunctiva normal.  ENT: Normocephalic, without  obvious abnormality, atraumatic.  Lungs: No increased work of breathing, good air exchange, clear to auscultation bilaterally, no crackles or wheezing.  Cardiovascular: Regular rate and rhythm, normal S1 and S2, no S3 or S4, and no murmur noted.  Abdomen:  normal bowel sounds, soft, non-distended, non-tender, no masses palpated, no hepatosplenomegally.  Genitourinary: deferred.  Musculoskeletal: No redness, warmth, or swelling of the joints. R groin clean and no bleeding, swelling.  Good pulses RLE.  Neurologic: Awake, alert, oriented to name, place and time.  No gross focal deficits.   Skin: No rashes, erythema, pallor, petechia or purpura.         Data:     Lab Results   Component Value Date    WBC 3.4 (L) 06/13/2018    WBC 3.0 (L) 05/29/2018    HGB 12.8 (L) 06/13/2018    HGB 12.7 (L) 05/29/2018    HCT 36.8 (L) 06/13/2018    HCT 37.1 (L) 05/29/2018     (L) 06/13/2018    PLT 94 (L) 05/29/2018     06/13/2018     05/29/2018    POTASSIUM 4.2 06/13/2018    POTASSIUM 4.6 05/29/2018    CHLORIDE 110 (H) 06/13/2018    CHLORIDE 106 05/29/2018    CO2 24 06/13/2018    CO2 24 05/29/2018    BUN 13 06/13/2018    BUN 12 05/29/2018    CR 0.50 (L) 06/13/2018    CR 0.58 (L) 05/29/2018     (H) 06/13/2018     (H) 05/29/2018    AST 64 (H) 06/13/2018    AST 79 (H) 05/29/2018    ALT 55 06/13/2018    ALT 64 05/29/2018    ALKPHOS 265 (H) 06/13/2018    ALKPHOS 299 (H) 05/29/2018    BILITOTAL 2.6 (H) 06/13/2018    BILITOTAL 2.2 (H) 05/29/2018    INR 1.46 (H) 06/13/2018    INR 1.53 (H) 05/29/2018     -  -     Attestation:  -    Asa Vilchis MD

## 2018-06-14 ENCOUNTER — APPOINTMENT (OUTPATIENT)
Dept: CT IMAGING | Facility: CLINIC | Age: 63
End: 2018-06-14
Attending: RADIOLOGY
Payer: MEDICARE

## 2018-06-14 ENCOUNTER — OFFICE VISIT (OUTPATIENT)
Dept: INTERPRETER SERVICES | Facility: CLINIC | Age: 63
End: 2018-06-14
Payer: MEDICARE

## 2018-06-14 VITALS
OXYGEN SATURATION: 98 % | SYSTOLIC BLOOD PRESSURE: 124 MMHG | DIASTOLIC BLOOD PRESSURE: 73 MMHG | TEMPERATURE: 97.7 F | BODY MASS INDEX: 28.25 KG/M2 | WEIGHT: 180 LBS | HEART RATE: 47 BPM | HEIGHT: 67 IN | RESPIRATION RATE: 16 BRPM

## 2018-06-14 LAB
ALBUMIN SERPL-MCNC: 2 G/DL (ref 3.4–5)
ALP SERPL-CCNC: 192 U/L (ref 40–150)
ALT SERPL W P-5'-P-CCNC: 97 U/L (ref 0–70)
ANION GAP SERPL CALCULATED.3IONS-SCNC: 8 MMOL/L (ref 3–14)
AST SERPL W P-5'-P-CCNC: 176 U/L (ref 0–45)
BILIRUB SERPL-MCNC: 2.2 MG/DL (ref 0.2–1.3)
BUN SERPL-MCNC: 21 MG/DL (ref 7–30)
CALCIUM SERPL-MCNC: 7.8 MG/DL (ref 8.5–10.1)
CHLORIDE SERPL-SCNC: 109 MMOL/L (ref 94–109)
CO2 SERPL-SCNC: 24 MMOL/L (ref 20–32)
CREAT SERPL-MCNC: 0.55 MG/DL (ref 0.66–1.25)
ERYTHROCYTE [DISTWIDTH] IN BLOOD BY AUTOMATED COUNT: 14.4 % (ref 10–15)
GFR SERPL CREATININE-BSD FRML MDRD: >90 ML/MIN/1.7M2
GLUCOSE SERPL-MCNC: 119 MG/DL (ref 70–99)
HCT VFR BLD AUTO: 34 % (ref 40–53)
HGB BLD-MCNC: 11.8 G/DL (ref 13.3–17.7)
INTERPRETATION ECG - MUSE: NORMAL
MCH RBC QN AUTO: 33.4 PG (ref 26.5–33)
MCHC RBC AUTO-ENTMCNC: 34.7 G/DL (ref 31.5–36.5)
MCV RBC AUTO: 96 FL (ref 78–100)
PLATELET # BLD AUTO: 101 10E9/L (ref 150–450)
POTASSIUM SERPL-SCNC: 3.9 MMOL/L (ref 3.4–5.3)
PROT SERPL-MCNC: 5.7 G/DL (ref 6.8–8.8)
RBC # BLD AUTO: 3.53 10E12/L (ref 4.4–5.9)
SODIUM SERPL-SCNC: 141 MMOL/L (ref 133–144)
WBC # BLD AUTO: 7.5 10E9/L (ref 4–11)

## 2018-06-14 PROCEDURE — 36415 COLL VENOUS BLD VENIPUNCTURE: CPT | Performed by: RADIOLOGY

## 2018-06-14 PROCEDURE — 99217 ZZC OBSERVATION CARE DISCHARGE: CPT | Performed by: INTERNAL MEDICINE

## 2018-06-14 PROCEDURE — 74150 CT ABDOMEN W/O CONTRAST: CPT

## 2018-06-14 PROCEDURE — 80053 COMPREHEN METABOLIC PANEL: CPT | Performed by: RADIOLOGY

## 2018-06-14 PROCEDURE — 25000128 H RX IP 250 OP 636: Performed by: INTERNAL MEDICINE

## 2018-06-14 PROCEDURE — T1013 SIGN LANG/ORAL INTERPRETER: HCPCS | Mod: U3

## 2018-06-14 PROCEDURE — G0378 HOSPITAL OBSERVATION PER HR: HCPCS

## 2018-06-14 PROCEDURE — 85027 COMPLETE CBC AUTOMATED: CPT | Performed by: RADIOLOGY

## 2018-06-14 RX ADMIN — SODIUM CHLORIDE: 9 INJECTION, SOLUTION INTRAVENOUS at 05:21

## 2018-06-14 NOTE — PROGRESS NOTES
"    Interventional Radiology Progress Note     June 14, 2018    Subjective: Interview performed with family as . Mr Limon notes some right groin pain this morning, otherwise is without complaint. Denies confusion, abdominal pain, nausea, vomiting, or lower extremity pain. Would like to go home.    Objective: /73 (BP Location: Left arm)  Pulse (!) 47  Temp 97.7  F (36.5  C) (Oral)  Resp 16  Ht 1.702 m (5' 7\")  Wt 81.6 kg (180 lb)  SpO2 98%  BMI 28.19 kg/m2    Results for orders placed or performed during the hospital encounter of 06/13/18 (from the past 24 hour(s))   Hepatic Panel (ALK, ALT, AST, albumin, total protein, bili)   Result Value Ref Range    Bilirubin Direct 1.3 (H) 0.0 - 0.2 mg/dL    Bilirubin Total 2.6 (H) 0.2 - 1.3 mg/dL    Albumin 2.5 (L) 3.4 - 5.0 g/dL    Protein Total 6.8 6.8 - 8.8 g/dL    Alkaline Phosphatase 265 (H) 40 - 150 U/L    ALT 55 0 - 70 U/L    AST 64 (H) 0 - 45 U/L   Basic Metabolic Panel   Result Value Ref Range    Sodium 141 133 - 144 mmol/L    Potassium 4.2 3.4 - 5.3 mmol/L    Chloride 110 (H) 94 - 109 mmol/L    Carbon Dioxide 24 20 - 32 mmol/L    Anion Gap 7 3 - 14 mmol/L    Glucose 110 (H) 70 - 99 mg/dL    Urea Nitrogen 13 7 - 30 mg/dL    Creatinine 0.50 (L) 0.66 - 1.25 mg/dL    GFR Estimate >90 >60 mL/min/1.7m2    GFR Estimate If Black >90 >60 mL/min/1.7m2    Calcium 8.3 (L) 8.5 - 10.1 mg/dL   CBC with platelets   Result Value Ref Range    WBC 3.4 (L) 4.0 - 11.0 10e9/L    RBC Count 3.84 (L) 4.4 - 5.9 10e12/L    Hemoglobin 12.8 (L) 13.3 - 17.7 g/dL    Hematocrit 36.8 (L) 40.0 - 53.0 %    MCV 96 78 - 100 fl    MCH 33.3 (H) 26.5 - 33.0 pg    MCHC 34.8 31.5 - 36.5 g/dL    RDW 14.3 10.0 - 15.0 %    Platelet Count 100 (L) 150 - 450 10e9/L   INR   Result Value Ref Range    INR 1.46 (H) 0.86 - 1.14   EKG 12-lead, tracing only   Result Value Ref Range    Interpretation ECG Click View Image link to view waveform and result    CBC with platelets   Result Value Ref Range "    WBC 7.5 4.0 - 11.0 10e9/L    RBC Count 3.53 (L) 4.4 - 5.9 10e12/L    Hemoglobin 11.8 (L) 13.3 - 17.7 g/dL    Hematocrit 34.0 (L) 40.0 - 53.0 %    MCV 96 78 - 100 fl    MCH 33.4 (H) 26.5 - 33.0 pg    MCHC 34.7 31.5 - 36.5 g/dL    RDW 14.4 10.0 - 15.0 %    Platelet Count 101 (L) 150 - 450 10e9/L   Comprehensive metabolic panel   Result Value Ref Range    Sodium 141 133 - 144 mmol/L    Potassium 3.9 3.4 - 5.3 mmol/L    Chloride 109 94 - 109 mmol/L    Carbon Dioxide 24 20 - 32 mmol/L    Anion Gap 8 3 - 14 mmol/L    Glucose 119 (H) 70 - 99 mg/dL    Urea Nitrogen 21 7 - 30 mg/dL    Creatinine 0.55 (L) 0.66 - 1.25 mg/dL    GFR Estimate >90 >60 mL/min/1.7m2    GFR Estimate If Black >90 >60 mL/min/1.7m2    Calcium 7.8 (L) 8.5 - 10.1 mg/dL    Bilirubin Total 2.2 (H) 0.2 - 1.3 mg/dL    Albumin 2.0 (L) 3.4 - 5.0 g/dL    Protein Total 5.7 (L) 6.8 - 8.8 g/dL    Alkaline Phosphatase 192 (H) 40 - 150 U/L    ALT 97 (H) 0 - 70 U/L     (H) 0 - 45 U/L   CT Abdomen w/o Contrast    Narrative    ABDOMEN CT WITHOUT CONTRAST 6/14/2018    CLINICAL HISTORY: Postembolization CT.    COMPARISONS: Chemoembolization 6/13/2018 and MRI 5/24/2018.    TECHNIQUE: Unenhanced CT performed through the abdomen. Coronal and  sagittal reconstructions were produced.    DOSE (DLP): 570 mGy*cm    FINDINGS: Segment 8 chemoembolization distribution. Partial staining  of the segment 8 lesion, primarily along the lateral and inferior half  of the lesion. The superior medial portion of the lesion is spared.    Gallbladder wall thickening. No pericholecystic fluid.    Left lower lobe calcified granuloma.    Small left pleural effusion.      Impression    IMPRESSION:  1. Images were reviewed with the performing Interventional Radiologist  Fellow. Partial staining of the segment 8 lesion, primarily along its  lateral and inferior portion. Superior medial portion of the lesion is  spared. This was the expected finding after the JOON-CT angiography  performed  during the procedure. Plan is to percutaneously ablate the  remaining lesion.    2. Gallbladder wall thickening.    SHIVA NICHOLS MD         Intake/Output Summary (Last 24 hours) at 06/14/18 0810  Last data filed at 06/13/18 2000   Gross per 24 hour   Intake               40 ml   Output              200 ml   Net             -160 ml       Imaging: Noncontrast CT abdomen reviewed. Expected staining in the right lobe with treatment of the inferomedial aspect of the tumor, as expected.     Physical Exam:  Gen: In bed, NAD  Chest: Lungs CTA bilaterally, RRR  Abdomen: Soft, nontender, nondistended. No RUQ tenderness. No gross ascites.  Extremities: Right groin puncture CDI. Palpable femoral pulse, no hematoma or PSA. Palpable right DP pulse  Neuro: AAOx3    Assessment/Plan: 63 year old yo M POD#1 following right lobe TACE with incomplete treatment of targeted tumor due to lack of discrete arterial supply to the superomedial aspect of the lesion. Mr Limon is doing well with no evidence of postembolization syndrome >12 hours following procedure. Puncture site without evidence of complication.   - Cirrhosis complicated by HCC  -- Well compensated disease, labs stable this morning  -- Incomplete treatment of lesion. Discussed with patient and family. Plan for microwave ablation of residual disease in 1-2 weeks which will be coordinated by IR as an outpatient. Patient and family state understanding of incomplete TACE coverage and followup plan.  -- Ready for discharge from an IR perspective    Discussed with Dr. Carin Ruffin  Fellow, Interventional Radiology  June 14, 2018    [phone]  [phone] After Hours

## 2018-06-14 NOTE — PROGRESS NOTES
Patient off bedrest at 2040. Ambulated to the hallway and to the bathroom. Voided spontaneously. Right groin site CDI, no hematoma no bleeding.

## 2018-06-14 NOTE — PLAN OF CARE
Observation goals to be met before discharge home:  Pain, nausea, and symptom control post procedure- MET  Labs and CT chest in AM. -UNMET, Will have labs and CT done in AM  Monitor for fever, confusion, bleeding- MET, pt denies pain, pt is alert and oriented x 4, afebrile.

## 2018-06-14 NOTE — PLAN OF CARE
Observation goals to be met before discharge home:  Pain, nausea, and symptom control post procedure- MET  Labs and CT chest in AM. -UNMET, Will have labs and CT done in AM  Monitor for fever, confusion, bleeding- MET, pt denies pain, pt is alert and oriented x 4, afebrile.         Pt sleeping

## 2018-06-14 NOTE — DISCHARGE SUMMARY
[facility] [place]    Discharge Summary  Hematology / Oncology    Date of Admission:  6/13/2018  Date of Discharge:  6/14/2018  Discharging Provider: Noelle Cheung  Date of Service (when I saw the patient): 06/14/18    Discharge Diagnoses   HCC (hepatocellular carcinoma)    History of Present Illness   Loy Limon is an 63 year old male with HCC induced by alcoholic cirrhosis who presented for observation after a TACE procedure to right hepatic lobe segment 8 on 6/13/18.     Please see H&P for detail of history.     Hospital Course   Loy Limon was admitted on 6/13/2018. The following problems were addressed during his hospitalization:    #Hepatocellular carcinoma.  Follows with Dr. Melgar and Dr. Del Rosario. Underwent TACE procedure to right hepatic lobe segment 8 on 6/13/18 with incomplete treatment of targeted tumor due to lack of discrete arterial supply to the superomedial aspect of the lesion. Plan is to pursue microwave ablation of residual disease in 1-2 weeks. Patient and family were informed of incomplete TACE coverage and follow-up plan. Patient has no evidence of encephalopathy, pain is well controlled, no nausea and is ambulating and eating/drinking without difficulty the day after the procedure. Labs/vitals stable. CT abdomen 6/14/18 shows expected partial staining of the segment 8 lesion.   - F/u on 6/18 and 6/20 for further work-up of transplant?  - IR to arrange microwave ablation of residual disease in 1-2 weeks    #Alcohol related cirrhosis.   Follows with Dr Carballo. Recently given Rifaximin because of hepatic encephalopathy, but he has not started it because of high co-pay. He is expecting that his insurance would cover it and then he will start. Unable to tolerate Lactulose.    Disposition: Discharged to home with significant other on 6/14/18.     This plan of care has been discussed with Dr. Silas Cheung, PA-C  Hematology/Oncology  Pager:   [phone]  Significant Results and Procedures   Results for orders placed or performed during the hospital encounter of 06/13/18   CT Abdomen w/o Contrast    Narrative    ABDOMEN CT WITHOUT CONTRAST 6/14/2018    CLINICAL HISTORY: Postembolization CT.    COMPARISONS: Chemoembolization 6/13/2018 and MRI 5/24/2018.    TECHNIQUE: Unenhanced CT performed through the abdomen. Coronal and  sagittal reconstructions were produced.    DOSE (DLP): 570 mGy*cm    FINDINGS: Segment 8 chemoembolization distribution. Partial staining  of the segment 8 lesion, primarily along the lateral and inferior half  of the lesion. The superior medial portion of the lesion is spared.    Gallbladder wall thickening. No pericholecystic fluid.    Left lower lobe calcified granuloma.    Small left pleural effusion.      Impression    IMPRESSION:  1. Images were reviewed with the performing Interventional Radiologist  Fellow. Partial staining of the segment 8 lesion, primarily along its  lateral and inferior portion. Superior medial portion of the lesion is  spared. This was the expected finding after the JOON-CT angiography  performed during the procedure. Plan is to percutaneously ablate the  remaining lesion.    2. Gallbladder wall thickening.    SHIVA NICHOLS MD     Pending Results   None    Code Status   Full Code    Primary Care Physician   Oncology [facility]    Physical Exam   Temp: 97.7  F (36.5  C) Temp src: Oral BP: 124/73 Pulse: (!) 47 Heart Rate: 57 Resp: 16 SpO2: 98 % O2 Device: None (Room air)    Vitals:    06/13/18 0930   Weight: 81.6 kg (180 lb)     Vital Signs with Ranges  Temp:  [97.5  F (36.4  C)-97.7  F (36.5  C)] 97.7  F (36.5  C)  Pulse:  [46-53] 47  Heart Rate:  [45-57] 57  Resp:  [8-16] 16  BP: (106-141)/(51-86) 124/73  SpO2:  [94 %-100 %] 98 %  I/O last 3 completed shifts:  In: 40 [P.O.:40]  Out: 200 [Urine:200]    Time Spent on this Encounter   INoelle, personally saw the patient today and  spent less than or equal to 30 minutes discharging this patient.    Discharge Disposition   Discharged to home  Condition at discharge: Stable    Consultations This Hospital Stay   None    Discharge Orders     **CBC with platelets FUTURE anytime   Last Lab Result: Hemoglobin (g/dL)      Date                     Value                06/14/2018               11.8 (L)         ----------     **Comprehensive metabolic panel FUTURE anytime     When to contact your care team   Call the [facility] 24-hour triage line at [phone] or [phone] for temp >100.4, uncontrolled nausea/vomiting/diarrhea/constipation, unrelieved pain, bleeding not relieved with pressure, dizziness, chest pain, shortness of breath, loss of consciousness, and any new or concerning symptoms.     Call [phone] and ask for the care coordinator that works with your oncologist if you have questions about scans, appointments, hospital follow-up, or other concerns.     Reason for your hospital stay   You were admitted after a TACE procedure. You tolerated this well. Your labs and vitals are stable and you are safe to discharge home. Please use your home supply of Tramadol for pain control as needed.     Activity   Your activity upon discharge: Do not lift more than 10 pounds for the next 3 to 5 days. After that you have no weight restrictions. You can return to work one week after your procedure on Thursday, 6/21/18.     Adult [facility] Follow-up and recommended labs and tests   -- You have upcoming tests on Monday (6/18) and Wednesday (6/20) for further evaluation of your liver cancer and consideration for a transplant.   -- IR will arrange a follow-up in 1-2 weeks for an ablation procedure. I requested that they call your daughter with this information.     Appointments on [place] and/or [facility] (with [facility] or [facility] provider or service). Call [phone] if you haven't heard regarding these appointments within 7 days of  discharge.     Diet   Follow this diet upon discharge: Regular diet as tolerated.       Discharge Medications   Current Discharge Medication List      CONTINUE these medications which have NOT CHANGED    Details   NADOLOL PO Take 20 mg by mouth daily      omeprazole (PRILOSEC) 20 MG CR capsule Take 20 mg by mouth      traMADol (ULTRAM) 50 MG tablet Take 50 mg by mouth      propranolol (INDERAL) 10 MG tablet Take 10 mg by mouth    Associated Diagnoses: Cirrhosis (H); Pulmonary nodules           Allergies   No Known Allergies  Data   ROUTINE IP LABS (Last four results)  BMP  Recent Labs  Lab 06/14/18 0628 06/13/18  0910    141   POTASSIUM 3.9 4.2   CHLORIDE 109 110*   YELENA 7.8* 8.3*   CO2 24 24   BUN 21 13   CR 0.55* 0.50*   * 110*     CBC  Recent Labs  Lab 06/14/18 0628 06/13/18  0910   WBC 7.5 3.4*   RBC 3.53* 3.84*   HGB 11.8* 12.8*   HCT 34.0* 36.8*   MCV 96 96   MCH 33.4* 33.3*   MCHC 34.7 34.8   RDW 14.4 14.3   * 100*     INR  Recent Labs  Lab 06/13/18  0910   INR 1.46*

## 2018-06-14 NOTE — PLAN OF CARE
Observation goals to be met before discharge home:  Pain, nausea, and symptom control post procedure- MET  Labs and CT chest in AM. -UNMET, Will have labs and CT done in AM  Monitor for fever, confusion, bleeding- MET, pt denies pain, pt is alert and oriented x 4, afebrile.    Pt sleeping.

## 2018-06-14 NOTE — PROGRESS NOTES
Pt A&Ox4, VSS, denies pain. Right groin site C/D/I, no hematoma, good peripheral pulse, and CMS intact. Pt tolerating regular diet, voiding spontaneously, and ambulating at baseline. CT complete and labs drawn. Pt cleared for d/c.     Discharge instructions reviewed using  and blue phone, understood, and signed by patient. VSS, PIV removed, patient has all belongings. Patient left unit via ambulating with family.

## 2018-06-15 ENCOUNTER — TELEPHONE (OUTPATIENT)
Dept: INTERVENTIONAL RADIOLOGY/VASCULAR | Facility: CLINIC | Age: 63
End: 2018-06-15

## 2018-06-15 ENCOUNTER — HOSPITAL ENCOUNTER (OUTPATIENT)
Facility: CLINIC | Age: 63
End: 2018-06-15
Payer: MEDICARE

## 2018-06-15 DIAGNOSIS — C22.0 HCC (HEPATOCELLULAR CARCINOMA) (H): Primary | ICD-10-CM

## 2018-06-15 NOTE — TELEPHONE ENCOUNTER
After 3 attempts unable to get through to Daughter Kaylene's number.     Will try again soon.

## 2018-06-18 ENCOUNTER — RADIANT APPOINTMENT (OUTPATIENT)
Dept: GENERAL RADIOLOGY | Facility: CLINIC | Age: 63
End: 2018-06-18
Attending: INTERNAL MEDICINE
Payer: MEDICARE

## 2018-06-18 ENCOUNTER — RADIANT APPOINTMENT (OUTPATIENT)
Dept: ULTRASOUND IMAGING | Facility: CLINIC | Age: 63
End: 2018-06-18
Attending: INTERNAL MEDICINE
Payer: MEDICARE

## 2018-06-18 ENCOUNTER — RADIANT APPOINTMENT (OUTPATIENT)
Dept: BONE DENSITY | Facility: CLINIC | Age: 63
End: 2018-06-18
Attending: INTERNAL MEDICINE
Payer: MEDICARE

## 2018-06-18 DIAGNOSIS — C22.0 HEPATOCELLULAR CARCINOMA (H): ICD-10-CM

## 2018-06-18 DIAGNOSIS — K74.60 CIRRHOSIS (H): ICD-10-CM

## 2018-06-18 DIAGNOSIS — C22.0 HCC (HEPATOCELLULAR CARCINOMA) (H): ICD-10-CM

## 2018-06-18 DIAGNOSIS — M89.9 DISORDER OF BONE: ICD-10-CM

## 2018-06-18 DIAGNOSIS — K74.60 CIRRHOSIS OF LIVER (H): Primary | ICD-10-CM

## 2018-06-18 LAB
AFP SERPL-MCNC: 3.8 UG/L (ref 0–8)
ALBUMIN SERPL-MCNC: 2.4 G/DL (ref 3.4–5)
ALP SERPL-CCNC: 220 U/L (ref 40–150)
ALT SERPL W P-5'-P-CCNC: 120 U/L (ref 0–70)
ANION GAP SERPL CALCULATED.3IONS-SCNC: 7 MMOL/L (ref 3–14)
AST SERPL W P-5'-P-CCNC: 100 U/L (ref 0–45)
BILIRUB DIRECT SERPL-MCNC: 1.4 MG/DL (ref 0–0.2)
BILIRUB SERPL-MCNC: 2.8 MG/DL (ref 0.2–1.3)
BUN SERPL-MCNC: 11 MG/DL (ref 7–30)
CALCIUM SERPL-MCNC: 7.9 MG/DL (ref 8.5–10.1)
CHLORIDE SERPL-SCNC: 106 MMOL/L (ref 94–109)
CO2 SERPL-SCNC: 26 MMOL/L (ref 20–32)
CREAT SERPL-MCNC: 0.63 MG/DL (ref 0.66–1.25)
ERYTHROCYTE [DISTWIDTH] IN BLOOD BY AUTOMATED COUNT: 13.4 % (ref 10–15)
GFR SERPL CREATININE-BSD FRML MDRD: >90 ML/MIN/1.7M2
GLUCOSE SERPL-MCNC: 124 MG/DL (ref 70–99)
HCT VFR BLD AUTO: 34.7 % (ref 40–53)
HGB BLD-MCNC: 12 G/DL (ref 13.3–17.7)
INR PPP: 1.58 (ref 0.86–1.14)
INTERPRETATION ECG - MUSE: NORMAL
MCH RBC QN AUTO: 33.5 PG (ref 26.5–33)
MCHC RBC AUTO-ENTMCNC: 34.6 G/DL (ref 31.5–36.5)
MCV RBC AUTO: 97 FL (ref 78–100)
PLATELET # BLD AUTO: 82 10E9/L (ref 150–450)
POTASSIUM SERPL-SCNC: 4.1 MMOL/L (ref 3.4–5.3)
PROT SERPL-MCNC: 6.6 G/DL (ref 6.8–8.8)
RAD FLAG-ADDENDUM: NORMAL
RBC # BLD AUTO: 3.58 10E12/L (ref 4.4–5.9)
SODIUM SERPL-SCNC: 139 MMOL/L (ref 133–144)
WBC # BLD AUTO: 4.1 10E9/L (ref 4–11)

## 2018-06-18 PROCEDURE — 82105 ALPHA-FETOPROTEIN SERUM: CPT | Performed by: RADIOLOGY

## 2018-06-18 NOTE — TELEPHONE ENCOUNTER
4th attempt to reach daughter Kaylene and asked her to return my call. Following up on her father s/p TACE as well as to inform her regarding liver ablation procedure.     Will try again.       Izabela Nolen RN, BSN  Interventional Radiology Nurse Coordinator   Phone: [phone]

## 2018-06-20 ENCOUNTER — OFFICE VISIT (OUTPATIENT)
Dept: TRANSPLANT | Facility: CLINIC | Age: 63
End: 2018-06-20
Attending: INTERNAL MEDICINE
Payer: MEDICARE

## 2018-06-20 ENCOUNTER — HOSPITAL ENCOUNTER (OUTPATIENT)
Dept: CARDIOLOGY | Facility: CLINIC | Age: 63
Discharge: HOME | End: 2018-06-20
Attending: INTERNAL MEDICINE | Admitting: INTERNAL MEDICINE
Payer: MEDICARE

## 2018-06-20 DIAGNOSIS — C22.0 HEPATOCELLULAR CARCINOMA (H): ICD-10-CM

## 2018-06-20 DIAGNOSIS — K74.60 CIRRHOSIS OF LIVER (H): Primary | ICD-10-CM

## 2018-06-20 DIAGNOSIS — Z79.899 OTHER LONG TERM (CURRENT) DRUG THERAPY: ICD-10-CM

## 2018-06-20 DIAGNOSIS — K74.60 CIRRHOSIS (H): ICD-10-CM

## 2018-06-20 PROCEDURE — 93321 DOPPLER ECHO F-UP/LMTD STD: CPT | Mod: 26 | Performed by: INTERNAL MEDICINE

## 2018-06-20 PROCEDURE — T1013 SIGN LANG/ORAL INTERPRETER: HCPCS | Mod: U3,ZF

## 2018-06-20 PROCEDURE — 93321 DOPPLER ECHO F-UP/LMTD STD: CPT | Mod: TC

## 2018-06-20 PROCEDURE — 25000125 ZZHC RX 250: Performed by: INTERNAL MEDICINE

## 2018-06-20 PROCEDURE — 93325 DOPPLER ECHO COLOR FLOW MAPG: CPT | Mod: 26 | Performed by: INTERNAL MEDICINE

## 2018-06-20 PROCEDURE — 93016 CV STRESS TEST SUPVJ ONLY: CPT | Mod: GC | Performed by: INTERNAL MEDICINE

## 2018-06-20 PROCEDURE — 93350 STRESS TTE ONLY: CPT | Mod: 26 | Performed by: INTERNAL MEDICINE

## 2018-06-20 PROCEDURE — 25000128 H RX IP 250 OP 636: Performed by: INTERNAL MEDICINE

## 2018-06-20 PROCEDURE — 25500064 ZZH RX 255 OP 636: Performed by: INTERNAL MEDICINE

## 2018-06-20 PROCEDURE — 93018 CV STRESS TEST I&R ONLY: CPT | Mod: GC | Performed by: INTERNAL MEDICINE

## 2018-06-20 PROCEDURE — T1013 SIGN LANG/ORAL INTERPRETER: HCPCS | Mod: U3

## 2018-06-20 RX ORDER — DOBUTAMINE HYDROCHLORIDE 200 MG/100ML
5-50 INJECTION INTRAVENOUS CONTINUOUS
Status: DISCONTINUED | OUTPATIENT
Start: 2018-06-20 | End: 2018-06-21 | Stop reason: HOSPADM

## 2018-06-20 RX ORDER — METOPROLOL TARTRATE 1 MG/ML
15 INJECTION, SOLUTION INTRAVENOUS
Status: DISCONTINUED | OUTPATIENT
Start: 2018-06-20 | End: 2018-06-21 | Stop reason: HOSPADM

## 2018-06-20 RX ADMIN — HUMAN ALBUMIN MICROSPHERES AND PERFLUTREN 3 ML: 10; .22 INJECTION, SOLUTION INTRAVENOUS at 14:47

## 2018-06-20 RX ADMIN — METOROPROLOL TARTRATE 3 MG: 5 INJECTION, SOLUTION INTRAVENOUS at 14:44

## 2018-06-20 RX ADMIN — ATROPINE SULFATE 0.2 MG: 0.4 INJECTION, SOLUTION INTRAMUSCULAR; INTRAVENOUS; SUBCUTANEOUS at 14:42

## 2018-06-20 RX ADMIN — DOBUTAMINE IN DEXTROSE 30 MCG/KG/MIN: 200 INJECTION, SOLUTION INTRAVENOUS at 14:40

## 2018-06-20 NOTE — LETTER
6/20/2018       RE: Loy Limon  [address]  [place] [address]     Dear Colleague,    Thank you for referring your patient, Loy Limon, to the [facility] TRANSPL[facility] at [facility]. Please see a copy of my visit note below.    Liver Transplant Evaluation/Teaching    TEACHING TOPICS: Evaluation Process, Evaluation Items, Diagnostic Studies, Consultation, Chemical Dependency Policy, CD Eval, Donor Source, Liver Allocation, MELD System, UNOS, Waiting List, Follow up while on transplant list, Follow up after transplantation, Infection and Rejection, Immunosuppression , Medication Teaching, Lab Recording after transplant, Laboratory Frequency after transplant , Consent for evaluation and One year survival rates  INSTRUCTIONAL MATERIAL USED/GIVEN: Liver Transplant Handbook, MELD Booklet, Donor Booklet, Web Sites Options, Verbal instructions, Multiple Listing Brochure , Consent for evaluation signed, One year survival rates and SRTR (Scientific Registry) Data  Person(s) involved in teaching: patient, patient's wife Drea and his daughter Kaylene (by phone)  Asks Questions: YES  Eager to Learn: YES  Cooperative: YES  Receptive (willing/able to accept information): YES  Reason for the appointment, diagnosis and treatment plan:YES  Knowledge of proper use of medications and conditions for which they are ordered (with special attention to potential side effects or drug interactions): YES  Which situations necessitate calling provider and whom to contact:YES  Other: NA  Teaching Concerns Addressed   Comments: Patient, pt's wife and his daughter attended transplant class. Asked excellent questions. Eval consent signed.   Proper use and care of (medical equip, care aids, etc.):YES  Nutritional needs and diet plan: YES  Pain management techniques: YES  Wound Care: YES  How and/when to access community resources: YES  Patient is aware of and agrees to required  commitment to post-op care and long term follow-up: YES  Patient has name and phone number of transplant coordinator.   Time Spent face-to-face teachin minutes.    Above education, class and consents completed with [facility] , Analy Carlton RN  Liver Transplant Coordinator                   Again, thank you for allowing me to participate in the care of your patient.      Sincerely,    Luci Luz

## 2018-06-20 NOTE — PROGRESS NOTES
Liver Transplant Evaluation/Teaching    TEACHING TOPICS: Evaluation Process, Evaluation Items, Diagnostic Studies, Consultation, Chemical Dependency Policy, CD Eval, Donor Source, Liver Allocation, MELD System, UNOS, Waiting List, Follow up while on transplant list, Follow up after transplantation, Infection and Rejection, Immunosuppression , Medication Teaching, Lab Recording after transplant, Laboratory Frequency after transplant , Consent for evaluation and One year survival rates  INSTRUCTIONAL MATERIAL USED/GIVEN: Liver Transplant Handbook, MELD Booklet, Donor Booklet, Web Sites Options, Verbal instructions, Multiple Listing Brochure , Consent for evaluation signed, One year survival rates and SRTR (Scientific Registry) Data  Person(s) involved in teaching: patient, patient's wife Drea and his daughter Kaylene (by phone)  Asks Questions: YES  Eager to Learn: YES  Cooperative: YES  Receptive (willing/able to accept information): YES  Reason for the appointment, diagnosis and treatment plan:YES  Knowledge of proper use of medications and conditions for which they are ordered (with special attention to potential side effects or drug interactions): YES  Which situations necessitate calling provider and whom to contact:YES  Other: NA  Teaching Concerns Addressed   Comments: Patient, pt's wife and his daughter attended transplant class. Asked excellent questions. Eval consent signed.   Proper use and care of (medical equip, care aids, etc.):YES  Nutritional needs and diet plan: YES  Pain management techniques: YES  Wound Care: YES  How and/when to access community resources: YES  Patient is aware of and agrees to required commitment to post-op care and long term follow-up: YES  Patient has name and phone number of transplant coordinator.   Time Spent face-to-face teachin minutes.    Above education, class and consents completed with [facility] , Analy Carlton RN  Liver Transplant Coordinator

## 2018-06-20 NOTE — MR AVS SNAPSHOT
After Visit Summary   6/20/2018    Loy Limon    MRN: [id number]           Patient Information     Date Of Birth          1955        Visit Information        Provider Department      6/20/2018 10:00 AM Luci Luz;  TRANSPLANT CLASS  Services Department        Today's Diagnoses     Cirrhosis of liver (H)    -  1       Follow-ups after your visit        Your next 10 appointments already scheduled     Jul 02, 2018 11:00 AM CDT   (Arrive by 9:00 AM)   CT LIVER ABLATION with YELENA MACKEY IR RAD, URCT2   [facility], [place], Radiology ([facility])    [address] [place] [address] [phone]           Please bring any scans or X-rays taken at other hospitals, if they may be helpful. Also bring a list of your medicines, including vitamins, minerals and over-the-counter drugs.  Tell your doctor in advance:   If you have any allergies.   If you are breastfeeding or there s any chance you are pregnant.   If you are taking Coumadin (or any other blood thinners) 5 days prior to the exam for any special instructions.   If you are diabetic to determine if your insulin needs have to be adjusted for the exam.  The day before your exam:   Drink extra fluids  at least six 8-ounce glasses (unless your doctor tells you to restrict fluids).  The day of your exam:   No eating or drinking for 4 hours before your test. You may take medicine with small sips of water.   Plan for an adult to drive you home and stay with you until morning.   Leave your valuables at home.  If you have any questions, please call the imaging department where you will have your exam.            Jul 02, 2018 11:00 AM CDT   CT LIVER ABLATION with UR IMAGING NURSE   [facility], [place],  Radiology ([facility])    [address] [place] [address] [phone]           Please bring any scans or X-rays  taken at other hospitals, if they may be helpful. Also bring a list of your medicines, including vitamins, minerals and over-the-counter drugs.  Tell your doctor in advance:   If you have any allergies.   If you are breastfeeding or there s any chance you are pregnant.   If you are taking Coumadin (or any other blood thinners) 5 days prior to the exam for any special instructions.   If you are diabetic to determine if your insulin needs have to be adjusted for the exam.  The day before your exam:   Drink extra fluids  at least six 8-ounce glasses (unless your doctor tells you to restrict fluids).  The day of your exam:   No eating or drinking for 4 hours before your test. You may take medicine with small sips of water.   Plan for an adult to drive you home and stay with you until morning.   Leave your valuables at home.  If you have any questions, please call the imaging department where you will have your exam.            Jul 09, 2018  7:00 AM CDT   (Arrive by 6:45 AM)   New Patient Visit with Mathew Self MD   [facility] ([facility])    [address]  4th Floor  [facility] [address] [phone]            Jul 09, 2018  7:30 AM CDT   (Arrive by 7:15 AM)   NEW LIVER EVALUATION with Magan Castro MD   [facility] ([facility])    [address]  [address] [place] [id number] [phone]            Jul 11, 2018 11:00 AM CDT   MR ABDOMEN W/O & W CONTRAST with UUMR1   [facility], [place], MRI ([facility])    [address] [address] [phone]           Take your medicines as usual, unless your doctor tells you not to. Bring a list of your current medicines to your exam (including vitamins, minerals and over-the-counter drugs). Also bring the results of similar scans you may have had.    You  may or may not receive IV contrast for this exam pending the discretion of the Radiologist.   Do not eat or drink for 6 hours prior to exam.  The MRI machine uses a strong magnet. Please wear clothes without metal (snaps, zippers). A sweatsuit works well, or we may give you a hospital gown.  Please remove any body piercings and hair extensions before you arrive. You will also remove watches, jewelry, hairpins, wallets, dentures, partial dental plates and hearing aids. You may wear contact lenses, and you may be able to wear your rings. We have a safe place to keep your personal items, but it is safer to leave them at home.  **IMPORTANT** THE INSTRUCTIONS BELOW ARE ONLY FOR THOSE PATIENTS WHO HAVE BEEN PRESCRIBED SEDATION OR GENERAL ANESTHESIA DURING THEIR MRI PROCEDURE:  IF YOUR DOCTOR PRESCRIBED ORAL SEDATION (take medicine to help you relax during your exam):   You must get the medicine from your doctor (oral medication) before you arrive. Bring the medicine to the exam. Do not take it at home. You ll be told when to take it upon arriving for your exam.   Arrive one hour early. Bring someone who can take you home after the test. Your medicine will make you sleepy. After the exam, you may not drive, take a bus or take a taxi by yourself.  IF YOUR DOCTOR PRESCRIBED IV SEDATION:   Arrive one hour early. Bring someone who can take you home after the test. Your medicine will make you sleepy. After the exam, you may not drive, take a bus or take a taxi by yourself.   No eating 6 hours before your exam. You may have clear liquids up until 4 hours before your exam. (Clear liquids include water, clear tea, black coffee and fruit juice without pulp.)  IF YOUR DOCTOR PRESCRIBED ANESTHESIA (be asleep for your exam):   Arrive 1 1/2 hours early. Bring someone who can take you home after the test. You may not drive, take a bus or take a taxi by yourself.   No eating 8 hours before your exam. You may have clear liquids up until 4  hours before your exam. (Clear liquids include water, clear tea, black coffee and fruit juice without pulp.)   You will spend four to five hours in the recovery room.  If you have any questions, please contact your Imaging Department exam site.            Jul 11, 2018  1:00 PM CDT   LAB with  LAB   [facility] ([facility])    [address]  1st Floor  [facility] [id number] [phone]           Please do not eat 10-12 hours before your appointment if you are coming in fasting for labs on lipids, cholesterol, or glucose (sugar). This does not apply to pregnant women. Water, hot tea and black coffee (with nothing added) are okay. Do not drink other fluids, diet soda or chew gum.            Jul 13, 2018  9:00 AM CDT   (Arrive by 8:45 AM)   Return Visit with Debora Ac MD   [facility] ([facility])    [address]  [address]  [place] [facility] [id number] [phone]            Aug 14, 2018  8:30 AM CDT   (Arrive by 8:15 AM)   Return General Liver with Tamela Carballo MD   [facility]atology ([facility])    [address]  [address]  [facility] [facility] [id number] [phone]              Future tests that were ordered for you today     Open Future Orders        Priority Expected Expires Ordered    ECHO STRESS DOBUTAMINE WITH OPTISON Routine  6/6/2019 6/6/2018            Who to contact     If you have questions or need follow up information about today's clinic visit or your schedule please contact [facility] directly at [phone].  Normal or non-critical lab and imaging results will be communicated to you by MyChart, letter or phone within 4 business days after the clinic has received the results. If you do not hear from us within 7 days, please contact the clinic through MyChart or phone. If you have a critical or abnormal lab result, we will notify you by  "phone as soon as possible.  Submit refill requests through [employer] or call your pharmacy and they will forward the refill request to us. Please allow 3 business days for your refill to be completed.          Additional Information About Your Visit        [employer] Information     [employer] lets you send messages to your doctor, view your test results, renew your prescriptions, schedule appointments and more. To sign up, go to [email or web address] . Click on \"Log in\" on the left side of the screen, which will take you to the Welcome page. Then click on \"Sign up Now\" on the right side of the page.     You will be asked to enter the access code listed below, as well as some personal information. Please follow the directions to create your username and password.     Your access code is: [email or web address]  Expires: 2018  8:03 AM     Your access code will  in 90 days. If you need help or a new code, please call your [facility] clinic or [phone].        Care EveryWhere ID     This is your Care EveryWhere ID. This could be used by other organizations to access your [facility] medical records  [id number]         Blood Pressure from Last 3 Encounters:   18 124/73   18 145/72   18 168/75    Weight from Last 3 Encounters:   18 81.6 kg (180 lb)   18 82.3 kg (181 lb 8 oz)   18 81.6 kg (180 lb)              Today, you had the following     No orders found for display       Primary Care Provider    [facility]       [address] [place] [id number]        Equal Access to Services     [facility]: Hadii aad ku hadasho Soomaali, waaxda luqadaha, qaybta kaalmada [place]. So [facility] [phone].    ATENCIÓN: Si habla español, tiene a samaniego disposición servicios gratuitos de asistencia lingüística. Llame al [phone].    We comply with applicable federal civil rights laws and [place] laws. We do not discriminate " on the basis of race, color, national origin, age, disability, sex, sexual orientation, or gender identity.            Thank you!     Thank you for choosing [facility]  for your care. Our goal is always to provide you with excellent care. Hearing back from our patients is one way we can continue to improve our services. Please take a few minutes to complete the written survey that you may receive in the mail after your visit with us. Thank you!             Your Updated Medication List - Protect others around you: Learn how to safely use, store and throw away your medicines at www.disposemymeds.org.          This list is accurate as of 6/20/18  4:00 PM.  Always use your most recent med list.                   Brand Name Dispense Instructions for use Diagnosis    NADOLOL PO      Take 20 mg by mouth daily        omeprazole 20 MG CR capsule    priLOSEC     Take 20 mg by mouth        propranolol 10 MG tablet    INDERAL     Take 10 mg by mouth    Cirrhosis (H), Pulmonary nodules       traMADol 50 MG tablet    ULTRAM     Take 50 mg by mouth

## 2018-06-20 NOTE — TELEPHONE ENCOUNTER
Called Kaylene, and informed her that we have him scheduled for his liver ablation with Dr Ac on Monday 7/2.    Informed her that pt is to check into the [facility] location on Monday 7/2 @ 9am for an 11am start time.     Prep provided to her and will also mail out a letter informing her of this information as well as a map for their convenience.     She agrees to plan.     Izabela Nolen RN, BSN  Interventional Radiology Nurse Coordinator   Phone: [phone]

## 2018-06-20 NOTE — LETTER
June 20, 2018    To Whom it May Concern,    Due to medical reasons it is recommended that Loy Limon not work more than 36 hours per week, and that he not lift more than 15 pounds. Additionally, it is recommended that his wife, Drea Kruse, be available to attend all of his follow up medical appointments.     Sincerely,         Tamela Carballo MD  Hepatology   Health

## 2018-06-22 ENCOUNTER — TELEPHONE (OUTPATIENT)
Dept: TRANSPLANT | Facility: CLINIC | Age: 63
End: 2018-06-22

## 2018-06-28 NOTE — OR NURSING
Daughter requested assistance with parking validation/payment for procedure and requested a  consult.  contacted and provided patient information. Will reach out to daughter.

## 2018-07-02 ENCOUNTER — SURGERY (OUTPATIENT)
Age: 63
End: 2018-07-02

## 2018-07-02 ENCOUNTER — HOSPITAL ENCOUNTER (OUTPATIENT)
Dept: CT IMAGING | Facility: CLINIC | Age: 63
End: 2018-07-02
Attending: RADIOLOGY | Admitting: RADIOLOGY
Payer: MEDICARE

## 2018-07-02 ENCOUNTER — HOSPITAL ENCOUNTER (OUTPATIENT)
Facility: CLINIC | Age: 63
Discharge: HOME | End: 2018-07-02
Attending: RADIOLOGY | Admitting: RADIOLOGY
Payer: MEDICARE

## 2018-07-02 ENCOUNTER — PRE VISIT (OUTPATIENT)
Dept: UROLOGY | Facility: CLINIC | Age: 63
End: 2018-07-02

## 2018-07-02 ENCOUNTER — ANESTHESIA (OUTPATIENT)
Dept: SURGERY | Facility: CLINIC | Age: 63
End: 2018-07-02
Payer: MEDICARE

## 2018-07-02 ENCOUNTER — OFFICE VISIT (OUTPATIENT)
Dept: INTERPRETER SERVICES | Facility: CLINIC | Age: 63
End: 2018-07-02
Payer: MEDICARE

## 2018-07-02 ENCOUNTER — ANESTHESIA EVENT (OUTPATIENT)
Dept: SURGERY | Facility: CLINIC | Age: 63
End: 2018-07-02
Payer: MEDICARE

## 2018-07-02 VITALS
BODY MASS INDEX: 27.89 KG/M2 | SYSTOLIC BLOOD PRESSURE: 153 MMHG | DIASTOLIC BLOOD PRESSURE: 73 MMHG | TEMPERATURE: 98.2 F | WEIGHT: 177.69 LBS | HEIGHT: 67 IN | RESPIRATION RATE: 10 BRPM | OXYGEN SATURATION: 96 %

## 2018-07-02 DIAGNOSIS — C22.0 HCC (HEPATOCELLULAR CARCINOMA) (H): Primary | ICD-10-CM

## 2018-07-02 DIAGNOSIS — C22.0 HCC (HEPATOCELLULAR CARCINOMA) (H): ICD-10-CM

## 2018-07-02 LAB
ABO + RH BLD: NORMAL
ABO + RH BLD: NORMAL
ALBUMIN SERPL-MCNC: 2.4 G/DL (ref 3.4–5)
ALP SERPL-CCNC: 359 U/L (ref 40–150)
ALT SERPL W P-5'-P-CCNC: 51 U/L (ref 0–70)
ANION GAP SERPL CALCULATED.3IONS-SCNC: 10 MMOL/L (ref 3–14)
APTT PPP: 30 SEC (ref 22–37)
AST SERPL W P-5'-P-CCNC: 65 U/L (ref 0–45)
BILIRUB DIRECT SERPL-MCNC: 0.9 MG/DL (ref 0–0.2)
BILIRUB SERPL-MCNC: 1.6 MG/DL (ref 0.2–1.3)
BLD GP AB SCN SERPL QL: NORMAL
BLOOD BANK CMNT PATIENT-IMP: NORMAL
BUN SERPL-MCNC: 10 MG/DL (ref 7–30)
CALCIUM SERPL-MCNC: 8.2 MG/DL (ref 8.5–10.1)
CHLORIDE SERPL-SCNC: 113 MMOL/L (ref 94–109)
CO2 SERPL-SCNC: 20 MMOL/L (ref 20–32)
CREAT SERPL-MCNC: 0.6 MG/DL (ref 0.66–1.25)
ERYTHROCYTE [DISTWIDTH] IN BLOOD BY AUTOMATED COUNT: 14.1 % (ref 10–15)
GFR SERPL CREATININE-BSD FRML MDRD: >90 ML/MIN/1.7M2
GLUCOSE SERPL-MCNC: 168 MG/DL (ref 70–99)
HCT VFR BLD AUTO: 35.7 % (ref 40–53)
HGB BLD-MCNC: 11.9 G/DL (ref 13.3–17.7)
INR PPP: 1.41 (ref 0.86–1.14)
MCH RBC QN AUTO: 32.9 PG (ref 26.5–33)
MCHC RBC AUTO-ENTMCNC: 33.3 G/DL (ref 31.5–36.5)
MCV RBC AUTO: 99 FL (ref 78–100)
PLATELET # BLD AUTO: 102 10E9/L (ref 150–450)
POTASSIUM SERPL-SCNC: 4.8 MMOL/L (ref 3.4–5.3)
PROT SERPL-MCNC: 6.5 G/DL (ref 6.8–8.8)
RBC # BLD AUTO: 3.62 10E12/L (ref 4.4–5.9)
SODIUM SERPL-SCNC: 143 MMOL/L (ref 133–144)
SPECIMEN EXP DATE BLD: NORMAL
WBC # BLD AUTO: 3.7 10E9/L (ref 4–11)

## 2018-07-02 PROCEDURE — 36415 COLL VENOUS BLD VENIPUNCTURE: CPT | Performed by: RADIOLOGY

## 2018-07-02 PROCEDURE — 25000128 H RX IP 250 OP 636: Performed by: NURSE ANESTHETIST, CERTIFIED REGISTERED

## 2018-07-02 PROCEDURE — 71000014 ZZH RECOVERY PHASE 1 LEVEL 2 FIRST HR

## 2018-07-02 PROCEDURE — 25000128 H RX IP 250 OP 636: Performed by: ANESTHESIOLOGY

## 2018-07-02 PROCEDURE — T1013 SIGN LANG/ORAL INTERPRETER: HCPCS | Mod: U3

## 2018-07-02 PROCEDURE — 71000027 ZZH RECOVERY PHASE 2 EACH 15 MINS

## 2018-07-02 PROCEDURE — 37000009 ZZH ANESTHESIA TECHNICAL FEE, EACH ADDTL 15 MIN

## 2018-07-02 PROCEDURE — 80076 HEPATIC FUNCTION PANEL: CPT | Performed by: RADIOLOGY

## 2018-07-02 PROCEDURE — 37000008 ZZH ANESTHESIA TECHNICAL FEE, 1ST 30 MIN

## 2018-07-02 PROCEDURE — 25000125 ZZHC RX 250: Performed by: NURSE ANESTHETIST, CERTIFIED REGISTERED

## 2018-07-02 PROCEDURE — 40000171 ZZH STATISTIC PRE-PROCEDURE ASSESSMENT III

## 2018-07-02 PROCEDURE — 25000566 ZZH SEVOFLURANE, EA 15 MIN

## 2018-07-02 PROCEDURE — C9399 UNCLASSIFIED DRUGS OR BIOLOG: HCPCS | Performed by: NURSE ANESTHETIST, CERTIFIED REGISTERED

## 2018-07-02 PROCEDURE — 85027 COMPLETE CBC AUTOMATED: CPT | Performed by: RADIOLOGY

## 2018-07-02 PROCEDURE — 27211108 CT LIVER ABLATION (RF)

## 2018-07-02 PROCEDURE — 86901 BLOOD TYPING SEROLOGIC RH(D): CPT | Performed by: ANESTHESIOLOGY

## 2018-07-02 PROCEDURE — 80048 BASIC METABOLIC PNL TOTAL CA: CPT | Performed by: RADIOLOGY

## 2018-07-02 PROCEDURE — 85610 PROTHROMBIN TIME: CPT | Performed by: RADIOLOGY

## 2018-07-02 PROCEDURE — 86900 BLOOD TYPING SEROLOGIC ABO: CPT | Performed by: ANESTHESIOLOGY

## 2018-07-02 PROCEDURE — 25000128 H RX IP 250 OP 636: Performed by: PHYSICIAN ASSISTANT

## 2018-07-02 PROCEDURE — 85730 THROMBOPLASTIN TIME PARTIAL: CPT | Performed by: RADIOLOGY

## 2018-07-02 PROCEDURE — 86850 RBC ANTIBODY SCREEN: CPT | Performed by: ANESTHESIOLOGY

## 2018-07-02 RX ORDER — FENTANYL CITRATE 50 UG/ML
25-50 INJECTION, SOLUTION INTRAMUSCULAR; INTRAVENOUS
Status: DISCONTINUED | OUTPATIENT
Start: 2018-07-02 | End: 2018-07-02 | Stop reason: HOSPADM

## 2018-07-02 RX ORDER — PROPOFOL 10 MG/ML
INJECTION, EMULSION INTRAVENOUS PRN
Status: DISCONTINUED | OUTPATIENT
Start: 2018-07-02 | End: 2018-07-02

## 2018-07-02 RX ORDER — HYDROMORPHONE HYDROCHLORIDE 1 MG/ML
.3-.5 INJECTION, SOLUTION INTRAMUSCULAR; INTRAVENOUS; SUBCUTANEOUS EVERY 10 MIN PRN
Status: DISCONTINUED | OUTPATIENT
Start: 2018-07-02 | End: 2018-07-02 | Stop reason: HOSPADM

## 2018-07-02 RX ORDER — LIDOCAINE HYDROCHLORIDE 20 MG/ML
INJECTION, SOLUTION INFILTRATION; PERINEURAL PRN
Status: DISCONTINUED | OUTPATIENT
Start: 2018-07-02 | End: 2018-07-02

## 2018-07-02 RX ORDER — SODIUM CHLORIDE, SODIUM LACTATE, POTASSIUM CHLORIDE, CALCIUM CHLORIDE 600; 310; 30; 20 MG/100ML; MG/100ML; MG/100ML; MG/100ML
INJECTION, SOLUTION INTRAVENOUS CONTINUOUS
Status: DISCONTINUED | OUTPATIENT
Start: 2018-07-02 | End: 2018-07-02 | Stop reason: HOSPADM

## 2018-07-02 RX ORDER — HYDROMORPHONE HYDROCHLORIDE 2 MG/1
2 TABLET ORAL EVERY 6 HOURS PRN
Qty: 20 TABLET | Refills: 0 | Status: SHIPPED | OUTPATIENT
Start: 2018-07-02 | End: 2018-08-07

## 2018-07-02 RX ORDER — ONDANSETRON 4 MG/1
4 TABLET, ORALLY DISINTEGRATING ORAL EVERY 30 MIN PRN
Status: DISCONTINUED | OUTPATIENT
Start: 2018-07-02 | End: 2018-07-02 | Stop reason: HOSPADM

## 2018-07-02 RX ORDER — ONDANSETRON 2 MG/ML
4 INJECTION INTRAMUSCULAR; INTRAVENOUS ONCE
Status: COMPLETED | OUTPATIENT
Start: 2018-07-02 | End: 2018-07-02

## 2018-07-02 RX ORDER — MEPERIDINE HYDROCHLORIDE 25 MG/ML
12.5 INJECTION INTRAMUSCULAR; INTRAVENOUS; SUBCUTANEOUS
Status: DISCONTINUED | OUTPATIENT
Start: 2018-07-02 | End: 2018-07-02 | Stop reason: HOSPADM

## 2018-07-02 RX ORDER — ONDANSETRON 2 MG/ML
4 INJECTION INTRAMUSCULAR; INTRAVENOUS EVERY 30 MIN PRN
Status: DISCONTINUED | OUTPATIENT
Start: 2018-07-02 | End: 2018-07-02 | Stop reason: HOSPADM

## 2018-07-02 RX ORDER — NALOXONE HYDROCHLORIDE 0.4 MG/ML
.1-.4 INJECTION, SOLUTION INTRAMUSCULAR; INTRAVENOUS; SUBCUTANEOUS
Status: DISCONTINUED | OUTPATIENT
Start: 2018-07-02 | End: 2018-07-02 | Stop reason: HOSPADM

## 2018-07-02 RX ORDER — FENTANYL CITRATE 50 UG/ML
INJECTION, SOLUTION INTRAMUSCULAR; INTRAVENOUS PRN
Status: DISCONTINUED | OUTPATIENT
Start: 2018-07-02 | End: 2018-07-02

## 2018-07-02 RX ORDER — CEFAZOLIN SODIUM 2 G/100ML
2 INJECTION, SOLUTION INTRAVENOUS
Status: COMPLETED | OUTPATIENT
Start: 2018-07-02 | End: 2018-07-02

## 2018-07-02 RX ORDER — SODIUM CHLORIDE 9 MG/ML
INJECTION, SOLUTION INTRAVENOUS CONTINUOUS
Status: DISCONTINUED | OUTPATIENT
Start: 2018-07-02 | End: 2018-07-02 | Stop reason: HOSPADM

## 2018-07-02 RX ADMIN — ONDANSETRON 4 MG: 2 INJECTION INTRAMUSCULAR; INTRAVENOUS at 17:16

## 2018-07-02 RX ADMIN — FENTANYL CITRATE 50 MCG: 50 INJECTION, SOLUTION INTRAMUSCULAR; INTRAVENOUS at 14:34

## 2018-07-02 RX ADMIN — PROPOFOL 150 MG: 10 INJECTION, EMULSION INTRAVENOUS at 14:34

## 2018-07-02 RX ADMIN — ROCURONIUM BROMIDE 10 MG: 10 INJECTION INTRAVENOUS at 16:05

## 2018-07-02 RX ADMIN — PROPOFOL 40 MG: 10 INJECTION, EMULSION INTRAVENOUS at 14:54

## 2018-07-02 RX ADMIN — Medication 0.5 MG: at 17:17

## 2018-07-02 RX ADMIN — SODIUM CHLORIDE, POTASSIUM CHLORIDE, SODIUM LACTATE AND CALCIUM CHLORIDE: 600; 310; 30; 20 INJECTION, SOLUTION INTRAVENOUS at 14:30

## 2018-07-02 RX ADMIN — MIDAZOLAM 2 MG: 1 INJECTION INTRAMUSCULAR; INTRAVENOUS at 14:30

## 2018-07-02 RX ADMIN — Medication 0.5 MG: at 16:49

## 2018-07-02 RX ADMIN — Medication 100 MG: at 14:35

## 2018-07-02 RX ADMIN — ONDANSETRON 4 MG: 2 INJECTION INTRAMUSCULAR; INTRAVENOUS at 16:16

## 2018-07-02 RX ADMIN — LIDOCAINE HYDROCHLORIDE 100 MG: 20 INJECTION, SOLUTION INFILTRATION; PERINEURAL at 14:34

## 2018-07-02 RX ADMIN — ROCURONIUM BROMIDE 25 MG: 10 INJECTION INTRAVENOUS at 14:54

## 2018-07-02 RX ADMIN — ROCURONIUM BROMIDE 10 MG: 10 INJECTION INTRAVENOUS at 15:38

## 2018-07-02 RX ADMIN — SUGAMMADEX 160 MG: 100 INJECTION, SOLUTION INTRAVENOUS at 16:21

## 2018-07-02 RX ADMIN — FENTANYL CITRATE 25 MCG: 50 INJECTION, SOLUTION INTRAMUSCULAR; INTRAVENOUS at 16:16

## 2018-07-02 RX ADMIN — FENTANYL CITRATE 25 MCG: 50 INJECTION, SOLUTION INTRAMUSCULAR; INTRAVENOUS at 16:03

## 2018-07-02 RX ADMIN — CEFAZOLIN SODIUM 2 G: 2 INJECTION, SOLUTION INTRAVENOUS at 15:30

## 2018-07-02 NOTE — PROCEDURES
[facility] Brief Procedure Note    Pre-operative diagnosis: Hepatocellular carcinoma   Post-operative diagnosis Hepatocellular carcinoma   Procedure: CT-guided microwave ablation of hepatocellular carcinoma liver lesion    Surgeon: Debora Del Rosario MD   Assistants(s): -   Estimated blood loss: None    Specimens: None   Findings: Uneventful CT-guided microwave ablation performed of HCC lesion in right lobe of liver.   Complications: None.

## 2018-07-02 NOTE — ANESTHESIA PREPROCEDURE EVALUATION
Anesthesia Evaluation     .             ROS/MED HX    ENT/Pulmonary: Comment: Underwent TACE procedure to right hepatic lobe segment 8 on 6/13/18 with incomplete treatment of targeted tumor due to lack of discrete arterial supply to the superomedial aspect of the lesion.     Today, the plan is to consider microwave ablation of residual disease in 1-2 weeks.                 Hepatocellular Carcinoma    Past Medical History:  No date: Cancer (H)      Comment: hepatocellualr carcinoma  5/8/2018: Cirrhosis of liver (H)  No date: Enlarged prostate  No date: Inguinal hernia  5/8/2018: Liver lesion    Past Surgical History:  No date: APPENDECTOMY  No date: COLONOSCOPY      Comment: 2018     No Known Allergies    Current Facility-Administered Medications:  ceFAZolin (ANCEF) intermittent infusion 2 g in 100 mL dextrose PRE-MIX  lactated ringers infusion  medication instruction  ondansetron (ZOFRAN) injection 4 mg  sodium chloride (PF) 0.9% PF flush 3 mL  sodium chloride 0.9% infusion        Temp: 36.8  C (98.2  F) Temp src: Oral BP: 116/67   Heart Rate: 56 Resp: 18 SpO2: 99 % O2 Device: None (Room air)      Prescriptions Prior to Admission:  NADOLOL PO, Take 20 mg by mouth daily, Disp: , Rfl: , 6/13/2018 at 0700  omeprazole (PRILOSEC) 20 MG CR capsule, Take 20 mg by mouth, Disp: , Rfl: , 6/12/2018 at 2000  Ondansetron HCl (ZOFRAN PO), Take 4 mg by mouth, Disp: , Rfl:   propranolol (INDERAL) 10 MG tablet, Take 10 mg by mouth, Disp: , Rfl: , hasn't started  traMADol (ULTRAM) 50 MG tablet, Take 50 mg by mouth, Disp: , Rfl: , Past Month at Unknown time        Lab Results       Component                Value               Date                       WBC                      4.1                 06/18/2018                 HGB                      12.0 (L)            06/18/2018                 HCT                      34.7 (L)            06/18/2018                 PLT                      82 (L)              06/18/2018                  ALT                      120 (H)             06/18/2018                 AST                      100 (H)             06/18/2018                 NA                       139                 06/18/2018                 BUN                      11                  06/18/2018                 CO2                      26                  06/18/2018                 INR                      1.58 (H)            06/18/2018                  Neurologic: Comment: History of hepatic encephalopathy      Cardiovascular:         METS/Exercise Tolerance:  1 - Eating, dressing   Hematologic:         Musculoskeletal:         GI/Hepatic:         Renal/Genitourinary:         Endo:         Psychiatric:         Infectious Disease:         Malignancy:         Other:                     Physical Exam      Airway   Mallampati: II  TM distance: >3 FB  Neck ROM: full    Dental   Comment: stable    Cardiovascular   Rhythm and rate: regular and normal      Pulmonary    breath sounds clear to auscultation                    Anesthesia Plan      History & Physical Review  History and physical reviewed and following examination; no interval change.    ASA Status:  3 .    NPO Status:  > 4 hours (had buritto and rice at 4 am in the morning and then some fruit at  7;30 am)    Plan for General and ETT with Intravenous and Propofol induction. Maintenance will be Balanced.      GA with ETT    Risks versus benefits discussed. All questions answered      Postoperative Care      Consents  Anesthetic plan, risks, benefits and alternatives discussed with:  Patient.  Use of blood products discussed: Yes.   Consented to blood products.  .                          .

## 2018-07-02 NOTE — DISCHARGE INSTRUCTIONS
1. No heavy lifting for 2 days (no greater than 10 pounds)    2. Resume usual diet    3. Can shower tomorrow    4. Dressing can come off at time of next shower    5. Pain killers as needed    6. Interventional Radiology will call you and arrange your follow up    Same-Day Surgery   Adult Discharge Orders & Instructions     For 24 hours after surgery:  1. Get plenty of rest.  A responsible adult must stay with you for at least 24 hours after you leave the hospital.   2. Pain medication can slow your reflexes. Do not drive or use heavy equipment.  If you have weakness or tingling, don't drive or use heavy equipment until this feeling goes away.  3. Mixing alcohol and pain medication can cause dizziness and slow your breathing. It can even be fatal. Do not drink alcohol while taking pain medication.  4. Avoid strenuous or risky activities.  Ask for help when climbing stairs.   5. You may feel lightheaded.  If so, sit for a few minutes before standing.  Have someone help you get up.   6. If you have nausea (feel sick to your stomach), drink only clear liquids such as apple juice, ginger ale, broth or 7-Up.  Rest may also help.  Be sure to drink enough fluids.  Move to a regular diet as you feel able. Take pain medications with a small amount of solid food, such as toast or crackers, to avoid nausea.   7. A slight fever is normal. Call the doctor if your fever is over 100 F (37.7 C) (taken under the tongue) or lasts longer than 24 hours.  8. You may have a dry mouth, muscle aches, trouble sleeping or a sore throat.  These symptoms should go away after 24 hours.  9. Do not make important or legal decisions.   Pain Management:      1. Take pain medication (if prescribed) for pain as directed by your physician.        2. WARNING: If the pain medication you have been prescribed contains Tylenol  (acetaminophen), DO NOT take additional doses of Tylenol (acetaminophen).     Call your doctor for any of the followin.   Signs of infection (fever, growing tenderness at the surgery site, severe pain, a large amount of drainage or bleeding, foul-smelling drainage, redness, swelling).    2.  It has been over 8 to 10 hours since surgery and you are still not able to urinate (pee).    3.  Headache for over 24 hours.    4.  Numbness, tingling or weakness the day after surgery (if you had spinal anesthesia).  To contact a doctor, call _____________________________________ or:      [phone] and ask for the Resident On Call for:          __________________________________________ (answered 24 hours a day)      Emergency Department:  [place] [facility]: [phone]  [place] [facility]: [phone]               Rev. 10/2014

## 2018-07-02 NOTE — OR NURSING
Text paged 8168 IR Resident on call to update on procedure delay. Pt last ate at 0730 (small fruit) and MDA Gerhard lariosay with procedure start time of 1330.

## 2018-07-02 NOTE — TELEPHONE ENCOUNTER
Reason for visit: Transplant clearance     Relevant information: Liver evaluation    Records/imaging/labs: Imaging available and CMP    Pt called: Message sent to transplant in an attempt to narrow down what their concerns are.    Rooming: regular.

## 2018-07-02 NOTE — IP AVS SNAPSHOT
[place] [address] [facility] [address]    Phone:  [phone]                                       After Visit Summary   7/2/2018    Loy Limon    MRN: [id number]           After Visit Summary Signature Page     I have received my discharge instructions, and my questions have been answered. I have discussed any challenges I see with this plan with the nurse or doctor.    ..........................................................................................................................................  Patient/Patient Representative Signature      ..........................................................................................................................................  Patient Representative Print Name and Relationship to Patient    ..................................................               ................................................  Date                                            Time    ..........................................................................................................................................  Reviewed by Signature/Title    ...................................................              ..............................................  Date                                                            Time

## 2018-07-02 NOTE — OR NURSING
WILLIAM Hennessy aware patient ate at 0400 this am- burrito with rice and small fruit at 0730 this am. Unable to do anesthesia until 1330 per WILLIAM.

## 2018-07-02 NOTE — ANESTHESIA POSTPROCEDURE EVALUATION
Patient: Loy Limon    Procedure(s):  CT Guided Liver Ablation @ 1100    Diagnosis:Hepatocellular Carcinoma  Diagnosis Additional Information: No value filed.    Anesthesia Type:  General, ETT    Note:  Anesthesia Post Evaluation    Patient location during evaluation: PACU  Patient participation: Able to fully participate in evaluation  Level of consciousness: awake  Pain management: adequate  Airway patency: patent  Cardiovascular status: acceptable  Respiratory status: acceptable  Hydration status: acceptable  PONV: none             Last vitals:  Vitals:    07/02/18 0930 07/02/18 1635   BP: 116/67    Resp: 18 (P) 12   Temp: 36.8  C (98.2  F) (P) 37  C (98.6  F)   SpO2: 99%          Electronically Signed By: Benigno Hernandez DO  July 2, 2018  4:42 PM

## 2018-07-02 NOTE — ANESTHESIA CARE TRANSFER NOTE
Patient: Loy Limon    Procedure(s):  CT Guided Liver Ablation @ 1100    Diagnosis: Hepatocellular Carcinoma  Diagnosis Additional Information: No value filed.    Anesthesia Type:   General, ETT     Note:  Airway :Face Mask  Patient transferred to:PACU  Comments: Pt transported Pacu on monitors, spontaneous rr on FM 02, vss, report given to RNHandoff Report: Identifed the Patient, Identified the Reponsible Provider, Reviewed the pertinent medical history, Discussed the surgical course, Reviewed Intra-OP anesthesia mangement and issues during anesthesia, Set expectations for post-procedure period and Allowed opportunity for questions and acknowledgement of understanding      Vitals: (Last set prior to Anesthesia Care Transfer)    CRNA VITALS  7/2/2018 1600 - 7/2/2018 1639      7/2/2018             Resp Rate (observed): (!)  2                Electronically Signed By: EZRA Timmons CRNA  July 2, 2018  4:39 PM

## 2018-07-02 NOTE — IP AVS SNAPSHOT
MRN:[id number]                      After Visit Summary   7/2/2018    Loy Limon    MRN: [id number]           Thank you!     Thank you for choosing [facility] for your care. Our goal is always to provide you with excellent care. Hearing back from our patients is one way we can continue to improve our services. Please take a few minutes to complete the written survey that you may receive in the mail after you visit with us. Thank you!        Patient Information     Date Of Birth          1955        About your hospital stay     You were admitted on:  July 2, 2018 You last received care in the:  [facility] PACU    You were discharged on:  July 2, 2018       Who to Call     For medical emergencies, please call 911.  For non-urgent questions about your medical care, please call your primary care provider or clinic, None  For questions related to your surgery, please call your surgery clinic        Attending Provider     Provider Specialty    Debora Ac MD Vascular and Interventional Radiology       Primary Care Provider    [facility] MD      Your next 10 appointments already scheduled     Jul 09, 2018  7:00 AM CDT   (Arrive by 6:45 AM)   New Patient Visit with Mathew Self MD   [facility] ([facility])    [address] [address] [phone]            Jul 11, 2018 11:00 AM CDT   MR ABDOMEN W/O & W CONTRAST with UUMR1   [facility] MRI ([facility])    [address] [id number] [phone]           Take your medicines as usual, unless your doctor tells you not to. Bring a list of your current medicines to your exam (including vitamins, minerals and over-the-counter drugs). Also bring the results of similar scans you may have had.    You may or may not receive IV contrast  for this exam pending the discretion of the Radiologist.   Do not eat or drink for 6 hours prior to exam.  The MRI machine uses a strong magnet. Please wear clothes without metal (snaps, zippers). A sweatsuit works well, or we may give you a hospital gown.  Please remove any body piercings and hair extensions before you arrive. You will also remove watches, jewelry, hairpins, wallets, dentures, partial dental plates and hearing aids. You may wear contact lenses, and you may be able to wear your rings. We have a safe place to keep your personal items, but it is safer to leave them at home.  **IMPORTANT** THE INSTRUCTIONS BELOW ARE ONLY FOR THOSE PATIENTS WHO HAVE BEEN PRESCRIBED SEDATION OR GENERAL ANESTHESIA DURING THEIR MRI PROCEDURE:  IF YOUR DOCTOR PRESCRIBED ORAL SEDATION (take medicine to help you relax during your exam):   You must get the medicine from your doctor (oral medication) before you arrive. Bring the medicine to the exam. Do not take it at home. You ll be told when to take it upon arriving for your exam.   Arrive one hour early. Bring someone who can take you home after the test. Your medicine will make you sleepy. After the exam, you may not drive, take a bus or take a taxi by yourself.  IF YOUR DOCTOR PRESCRIBED IV SEDATION:   Arrive one hour early. Bring someone who can take you home after the test. Your medicine will make you sleepy. After the exam, you may not drive, take a bus or take a taxi by yourself.   No eating 6 hours before your exam. You may have clear liquids up until 4 hours before your exam. (Clear liquids include water, clear tea, black coffee and fruit juice without pulp.)  IF YOUR DOCTOR PRESCRIBED ANESTHESIA (be asleep for your exam):   Arrive 1 1/2 hours early. Bring someone who can take you home after the test. You may not drive, take a bus or take a taxi by yourself.   No eating 8 hours before your exam. You may have clear liquids up until 4 hours before your exam. (Clear  liquids include water, clear tea, black coffee and fruit juice without pulp.)   You will spend four to five hours in the recovery room.  If you have any questions, please contact your Imaging Department exam site.            Jul 11, 2018  1:00 PM CDT   LAB with  LAB   [facility] ([facility])    [address]  1st Floor  [facility] [id number] [phone]           Please do not eat 10-12 hours before your appointment if you are coming in fasting for labs on lipids, cholesterol, or glucose (sugar). This does not apply to pregnant women. Water, hot tea and black coffee (with nothing added) are okay. Do not drink other fluids, diet soda or chew gum.            Jul 11, 2018  2:00 PM CDT   (Arrive by 1:45 PM)   NEW LIVER EVALUATION with Praveena Monk MD   [facility] ([facility])    [address]  [address]  [facility] [id number] [phone]            Jul 13, 2018  9:00 AM CDT   (Arrive by 8:45 AM)   Return Visit with Debora Ac MD   [facility] ([facility])    [address]  [address]  [facility] [id number] [phone]            Aug 14, 2018  8:30 AM CDT   (Arrive by 8:15 AM)   Return General Liver with Tamela Carballo MD   [facility] ([facility])    [address]  [address]  [facility] [id number] [phone]            Aug 14, 2018  6:00 PM CDT   (Arrive by 5:45 PM)   New Patient Visit with Lesly Zazueta MD   [facility] ([facility])    [address]  [address]  [facility] [id number] [phone]            Oct 03, 2018  8:30 AM CDT   MR ABDOMEN W/O & W CONTRAST with [address] [facility] [place] [facility] ([facility])    [address]  1st Floor  [facility] [id number] [phone]            Take your medicines as usual, unless your doctor tells you not to. Bring a list of your current medicines to your exam (including vitamins, minerals and over-the-counter drugs). Also bring the results of similar scans you may have had.    You may or may not receive IV contrast for this exam pending the discretion of the Radiologist.   Do not eat or drink for 6 hours prior to exam.  The MRI machine uses a strong magnet. Please wear clothes without metal (snaps, zippers). A sweatsuit works well, or we may give you a hospital gown.  Please remove any body piercings and hair extensions before you arrive. You will also remove watches, jewelry, hairpins, wallets, dentures, partial dental plates and hearing aids. You may wear contact lenses, and you may be able to wear your rings. We have a safe place to keep your personal items, but it is safer to leave them at home.  **IMPORTANT** THE INSTRUCTIONS BELOW ARE ONLY FOR THOSE PATIENTS WHO HAVE BEEN PRESCRIBED SEDATION OR GENERAL ANESTHESIA DURING THEIR MRI PROCEDURE:  IF YOUR DOCTOR PRESCRIBED ORAL SEDATION (take medicine to help you relax during your exam):   You must get the medicine from your doctor (oral medication) before you arrive. Bring the medicine to the exam. Do not take it at home. You ll be told when to take it upon arriving for your exam.   Arrive one hour early. Bring someone who can take you home after the test. Your medicine will make you sleepy. After the exam, you may not drive, take a bus or take a taxi by yourself.  IF YOUR DOCTOR PRESCRIBED IV SEDATION:   Arrive one hour early. Bring someone who can take you home after the test. Your medicine will make you sleepy. After the exam, you may not drive, take a bus or take a taxi by yourself.   No eating 6 hours before your exam. You may have clear liquids up until 4 hours before your exam. (Clear liquids include water, clear tea, black coffee and fruit juice without pulp.)  IF YOUR DOCTOR PRESCRIBED  ANESTHESIA (be asleep for your exam):   Arrive 1 1/2 hours early. Bring someone who can take you home after the test. You may not drive, take a bus or take a taxi by yourself.   No eating 8 hours before your exam. You may have clear liquids up until 4 hours before your exam. (Clear liquids include water, clear tea, black coffee and fruit juice without pulp.)   You will spend four to five hours in the recovery room.  If you have any questions, please contact your Imaging Department exam site.            Oct 03, 2018  9:20 AM CDT   CT CHEST W/O CONTRAST with UCCT1   [facility] CT ([facility])    [address] [place] [address] [phone]           Please bring any scans or X-rays taken at other hospitals, if similar tests were done. Also bring a list of your medicines, including vitamins, minerals and over-the-counter drugs. It is safest to leave personal items at home.  Be sure to tell your doctor:   If you have any allergies.   If there s any chance you are pregnant.   If you are breastfeeding.  You do not need to do anything special to prepare for this exam.  Please wear loose clothing, such as a sweat suit or jogging clothes. Avoid snaps, zippers and other metal. We may ask you to undress and put on a hospital gown.            Oct 03, 2018  9:45 AM CDT   Lab with  LAB   [facility] ([facility])    [address] [place] [address] [phone]              Further instructions from your care team         1. No heavy lifting for 2 days (no greater than 10 pounds)    2. Resume usual diet    3. Can shower tomorrow    4. Dressing can come off at time of next shower    5. Pain killers as needed    6. Interventional Radiology will call you and arrange your follow up    Same-Day Surgery   Adult Discharge Orders & Instructions     For 24 hours after surgery:  1. Get plenty of rest.  A responsible  adult must stay with you for at least 24 hours after you leave the hospital.   2. Pain medication can slow your reflexes. Do not drive or use heavy equipment.  If you have weakness or tingling, don't drive or use heavy equipment until this feeling goes away.  3. Mixing alcohol and pain medication can cause dizziness and slow your breathing. It can even be fatal. Do not drink alcohol while taking pain medication.  4. Avoid strenuous or risky activities.  Ask for help when climbing stairs.   5. You may feel lightheaded.  If so, sit for a few minutes before standing.  Have someone help you get up.   6. If you have nausea (feel sick to your stomach), drink only clear liquids such as apple juice, ginger ale, broth or 7-Up.  Rest may also help.  Be sure to drink enough fluids.  Move to a regular diet as you feel able. Take pain medications with a small amount of solid food, such as toast or crackers, to avoid nausea.   7. A slight fever is normal. Call the doctor if your fever is over 100 F (37.7 C) (taken under the tongue) or lasts longer than 24 hours.  8. You may have a dry mouth, muscle aches, trouble sleeping or a sore throat.  These symptoms should go away after 24 hours.  9. Do not make important or legal decisions.   Pain Management:      1. Take pain medication (if prescribed) for pain as directed by your physician.        2. WARNING: If the pain medication you have been prescribed contains Tylenol  (acetaminophen), DO NOT take additional doses of Tylenol (acetaminophen).     Call your doctor for any of the followin.  Signs of infection (fever, growing tenderness at the surgery site, severe pain, a large amount of drainage or bleeding, foul-smelling drainage, redness, swelling).    2.  It has been over 8 to 10 hours since surgery and you are still not able to urinate (pee).    3.  Headache for over 24 hours.    4.  Numbness, tingling or weakness the day after surgery (if you had spinal anesthesia).  To  "contact a doctor, call _____________________________________ or:      [phone] and ask for the Resident On Call for:          __________________________________________ (answered 24 hours a day)      Emergency Department:  [facility]: [phone]  [facility]: [phone]               Rev. 10/2014         Pending Results     No orders found from 2018 to 7/3/2018.            Statement of Approval     Ordered          18 7120  I have reviewed and agree with all the recommendations and orders detailed in this document.  EFFECTIVE NOW     Approved and electronically signed by:  Debora Ac MD             Admission Information     Date & Time Provider Department Dept. Phone    2018 Debora Ac MD [facility] PACU [phone]      Your Vitals Were     Blood Pressure Temperature Respirations Height Weight Pulse Oximetry    166/74 98.6  F (37  C) (Oral) 8 1.702 m (5' 7\") 80.6 kg (177 lb 11.1 oz) 97%    BMI (Body Mass Index)                   27.83 kg/m2           MyChart Information     [employer] lets you send messages to your doctor, view your test results, renew your prescriptions, schedule appointments and more. To sign up, go to [email or web address] . Click on \"Log in\" on the left side of the screen, which will take you to the Welcome page. Then click on \"Sign up Now\" on the right side of the page.     You will be asked to enter the access code listed below, as well as some personal information. Please follow the directions to create your username and password.     Your access code is: [email or web address]  Expires: 2018  8:03 AM     Your access code will  in 90 days. If you need help or a new code, please call your [facility] clinic or [phone].        Care EveryWhere ID     This is your Care EveryWhere ID. This could be used by other organizations to access your [facility] medical records  [id number]        Equal Access to Services     DAVID COWAN" AH: Woodyii kostas giraldo Zain, waaxda luqadaha, qaybta kaalkya hill, javier maribel ameliahyacinth frankshama greykaitloco mckay. So [facility] [phone].    ATENCIÓN: Si habla español, tiene a samaniego disposición servicios gratuitos de asistencia lingüística. Llame al [phone].    We comply with applicable federal civil rights laws and [place] laws. We do not discriminate on the basis of race, color, national origin, age, disability, sex, sexual orientation, or gender identity.               Review of your medicines      START taking        Dose / Directions    HYDROmorphone 2 MG tablet   Commonly known as:  DILAUDID   Used for:  HCC (hepatocellular carcinoma) (H)        Dose:  2 mg   Take 1 tablet (2 mg) by mouth every 6 hours as needed for pain   Quantity:  20 tablet   Refills:  0         CONTINUE these medicines which have NOT CHANGED        Dose / Directions    NADOLOL PO        Dose:  20 mg   Take 20 mg by mouth daily   Refills:  0       omeprazole 20 MG CR capsule   Commonly known as:  priLOSEC        Dose:  20 mg   Take 20 mg by mouth   Refills:  0       propranolol 10 MG tablet   Commonly known as:  INDERAL   Used for:  Cirrhosis (H), Pulmonary nodules        Dose:  10 mg   Take 10 mg by mouth   Refills:  0       traMADol 50 MG tablet   Commonly known as:  ULTRAM        Dose:  50 mg   Take 50 mg by mouth   Refills:  0       ZOFRAN PO        Dose:  4 mg   Take 4 mg by mouth   Refills:  0            Where to get your medicines      Some of these will need a paper prescription and others can be bought over the counter. Ask your nurse if you have questions.     Bring a paper prescription for each of these medications     HYDROmorphone 2 MG tablet                Protect others around you: Learn how to safely use, store and throw away your medicines at www.disposemymeds.org.        Information about OPIOIDS     PRESCRIPTION OPIOIDS: WHAT YOU NEED TO KNOW   We gave you an opioid (narcotic) pain medicine. It is important to  manage your pain, but opioids are not always the best choice. You should first try all the other options your care team gave you. Take this medicine for as short a time (and as few doses) as possible.     These medicines have risks:    DO NOT drive when on new or higher doses of pain medicine. These medicines can affect your alertness and reaction times, and you could be arrested for driving under the influence (DUI). If you need to use opioids long-term, talk to your care team about driving.    DO NOT operate heave machinery    DO NOT do any other dangerous activities while taking these medicines.     DO NOT drink any alcohol while taking these medicines.      If the opioid prescribed includes acetaminophen, DO NOT take with any other medicines that contain acetaminophen. Read all labels carefully. Look for the word  acetaminophen  or  Tylenol.  Ask your pharmacist if you have questions or are unsure.    You can get addicted to pain medicines, especially if you have a history of addiction (chemical, alcohol or substance dependence). Talk to your care team about ways to reduce this risk.    Store your pills in a secure place, locked if possible. We will not replace any lost or stolen medicine. If you don t finish your medicine, please throw away (dispose) as directed by your pharmacist. The [place] Pollution Control Agency has more information about safe disposal: https://www.pca.[facility].mn.us/living-green/managing-unwanted-medications.     All opioids tend to cause constipation. Drink plenty of water and eat foods that have a lot of fiber, such as fruits, vegetables, prune juice, apple juice and high-fiber cereal. Take a laxative (Miralax, milk of magnesia, Colace, Senna) if you don t move your bowels at least every other day.              Medication List: This is a list of all your medications and when to take them. Check marks below indicate your daily home schedule. Keep this list as a reference.       Medications           Morning Afternoon Evening Bedtime As Needed    HYDROmorphone 2 MG tablet   Commonly known as:  DILAUDID   Take 1 tablet (2 mg) by mouth every 6 hours as needed for pain                                NADOLOL PO   Take 20 mg by mouth daily                                omeprazole 20 MG CR capsule   Commonly known as:  priLOSEC   Take 20 mg by mouth                                propranolol 10 MG tablet   Commonly known as:  INDERAL   Take 10 mg by mouth                                traMADol 50 MG tablet   Commonly known as:  ULTRAM   Take 50 mg by mouth                                ZOFRAN PO   Take 4 mg by mouth                                          More Information        Instrucciones de hina para la biopsia del hígado  A usted se le practicó dyana biopsia del hígado, en la cual samaniego proveedor de atención médica extrajo dyana pequeña muestra de tejido del hígado para examinarla a fin de determinar si existen señales de daño y enfermedad. Se pide dyana biopsia del hígado después de que otras pruebas hayan mostrado que el hígado no está funcionando armaan. Las biopsias del hígado también se practican cuando se sospecha dyana insuficiencia hepática, para determinar si hay demasiado rosanna en el hígado y para excluir la posibilidad de cáncer.  Cuidados en la casa  Estas son algunas recomendaciones:    Puesto que le pusieron anestesia, no debe manejar hasta un día después de la biopsia.    Quite el vendaje que cubre el sitio de la biopsia 48 horas después del procedimiento.    Descanse nancy 4 horas y tómeselo con calma cuando llegue a casa.    No se duche nancy 6 horas después de la biopsia. Si lo desea, puede lavarse con esponja o toallita. Cuando pueda ducharse, no se frote el sitio de la biopsia. Lávese cuidadosamente la maria guadalupe y séquela con palmaditas.    No levante nada que pese más de 10 libras nancy 1 día después del procedimiento.    Pregúntele a samaniego proveedor de atención médica cuándo  puede volver a trabajar.    No comience a cherrie anticoagulantes sin recibir antes instrucciones claras de nikhil proveedores de atención médica.  Visitas de control  Programe dyana visita de control según le indique nuestro personal.     Cuándo llamar a samaniego proveedor de atención médica  Llame a samaniego proveedor de atención médica de inmediato si nota que tiene cualquiera de estos síntomas:    Sangrado del sitio de la biopsia    Mareo o aturdimiento    Dificultad para respirar (repentina o en aumento)    Dolor repentino en el pecho    Fiebre por encima de 100.4 F (38.0 C)    Escalofríos temblorosos    Coloración amarillenta en los ojos o la piel    Mayor enrojecimiento, sensibilidad al tacto o hinchazón en el sitio de la biopsia    Supuración del sitio de la biopsia    Abertura del sitio de la biopsia    Vómito con natali    Sangrado del recto o heces con natali    Aumento del dolor, con o sin actividad, en el hígado o en la maria guadalupe del abdomen o dolor que sube hasta el hombro derecho   Date Last Reviewed: 3/24/2013    [id number] [employer]. [address] [place] [address]. Todos los derechos reservados. Esta información no pretende sustituir la atención médica profesional. Sólo samaniego médico puede diagnosticar y tratar un problema de ina.

## 2018-07-06 ENCOUNTER — TELEPHONE (OUTPATIENT)
Dept: INTERVENTIONAL RADIOLOGY/VASCULAR | Facility: CLINIC | Age: 63
End: 2018-07-06

## 2018-07-06 NOTE — TELEPHONE ENCOUNTER
Called and left a msg for daughter to return my call regarding how pt is doing s/p liver ablation     Informed her that we will go ahead and schedule for his 1 mos f/u and call him back.     I've asked that she return my call and left direct line.     Izabela oNlen RN, BSN  Interventional Radiology Nurse Coordinator   Phone: [phone]

## 2018-07-11 ENCOUNTER — HOSPITAL ENCOUNTER (OUTPATIENT)
Dept: MRI IMAGING | Facility: CLINIC | Age: 63
Discharge: HOME | End: 2018-07-11
Attending: RADIOLOGY | Admitting: RADIOLOGY
Payer: MEDICARE

## 2018-07-11 ENCOUNTER — OFFICE VISIT (OUTPATIENT)
Dept: CARDIOLOGY | Facility: CLINIC | Age: 63
End: 2018-07-11
Attending: INTERNAL MEDICINE
Payer: MEDICARE

## 2018-07-11 VITALS
HEART RATE: 50 BPM | BODY MASS INDEX: 28.43 KG/M2 | SYSTOLIC BLOOD PRESSURE: 143 MMHG | OXYGEN SATURATION: 100 % | WEIGHT: 176.9 LBS | DIASTOLIC BLOOD PRESSURE: 69 MMHG | HEIGHT: 66 IN

## 2018-07-11 DIAGNOSIS — C22.0 HEPATOCELLULAR CARCINOMA (H): ICD-10-CM

## 2018-07-11 DIAGNOSIS — C22.0 HCC (HEPATOCELLULAR CARCINOMA) (H): ICD-10-CM

## 2018-07-11 DIAGNOSIS — R94.39 ABNORMAL RESULT OF OTHER CARDIOVASCULAR FUNCTION STUDY (CODE): ICD-10-CM

## 2018-07-11 DIAGNOSIS — R93.1 EQUIVOCAL STRESS ECHOCARDIOGRAPHY: Primary | ICD-10-CM

## 2018-07-11 DIAGNOSIS — K74.69 OTHER CIRRHOSIS OF LIVER (H): ICD-10-CM

## 2018-07-11 LAB
ALBUMIN SERPL-MCNC: 2.4 G/DL (ref 3.4–5)
ALP SERPL-CCNC: 270 U/L (ref 40–150)
ALT SERPL W P-5'-P-CCNC: 50 U/L (ref 0–70)
ANION GAP SERPL CALCULATED.3IONS-SCNC: 7 MMOL/L (ref 3–14)
AST SERPL W P-5'-P-CCNC: 60 U/L (ref 0–45)
BILIRUB SERPL-MCNC: 2.2 MG/DL (ref 0.2–1.3)
BUN SERPL-MCNC: 10 MG/DL (ref 7–30)
CALCIUM SERPL-MCNC: 8.5 MG/DL (ref 8.5–10.1)
CHLORIDE SERPL-SCNC: 111 MMOL/L (ref 94–109)
CO2 SERPL-SCNC: 26 MMOL/L (ref 20–32)
CREAT SERPL-MCNC: 0.5 MG/DL (ref 0.66–1.25)
ERYTHROCYTE [DISTWIDTH] IN BLOOD BY AUTOMATED COUNT: 13.9 % (ref 10–15)
GFR SERPL CREATININE-BSD FRML MDRD: >90 ML/MIN/1.7M2
GLUCOSE SERPL-MCNC: 107 MG/DL (ref 70–99)
HCT VFR BLD AUTO: 34.2 % (ref 40–53)
HGB BLD-MCNC: 11.8 G/DL (ref 13.3–17.7)
MCH RBC QN AUTO: 33.9 PG (ref 26.5–33)
MCHC RBC AUTO-ENTMCNC: 34.5 G/DL (ref 31.5–36.5)
MCV RBC AUTO: 98 FL (ref 78–100)
PLATELET # BLD AUTO: 81 10E9/L (ref 150–450)
POTASSIUM SERPL-SCNC: 3.8 MMOL/L (ref 3.4–5.3)
PROT SERPL-MCNC: 6.5 G/DL (ref 6.8–8.8)
RBC # BLD AUTO: 3.48 10E12/L (ref 4.4–5.9)
SODIUM SERPL-SCNC: 144 MMOL/L (ref 133–144)
WBC # BLD AUTO: 3.4 10E9/L (ref 4–11)

## 2018-07-11 PROCEDURE — 25000128 H RX IP 250 OP 636: Performed by: STUDENT IN AN ORGANIZED HEALTH CARE EDUCATION/TRAINING PROGRAM

## 2018-07-11 PROCEDURE — 99204 OFFICE O/P NEW MOD 45 MIN: CPT | Mod: 25 | Performed by: INTERNAL MEDICINE

## 2018-07-11 PROCEDURE — G0463 HOSPITAL OUTPT CLINIC VISIT: HCPCS | Mod: 25,ZF

## 2018-07-11 PROCEDURE — A9585 GADOBUTROL INJECTION: HCPCS | Performed by: STUDENT IN AN ORGANIZED HEALTH CARE EDUCATION/TRAINING PROGRAM

## 2018-07-11 PROCEDURE — T1013 SIGN LANG/ORAL INTERPRETER: HCPCS | Mod: U3

## 2018-07-11 PROCEDURE — T1013 SIGN LANG/ORAL INTERPRETER: HCPCS | Mod: U3,ZF

## 2018-07-11 PROCEDURE — 74183 MRI ABD W/O CNTR FLWD CNTR: CPT

## 2018-07-11 RX ORDER — RIFAXIMIN 550 MG/1
550 TABLET ORAL 2 TIMES DAILY
Status: ON HOLD | COMMUNITY
Start: 2018-06-28 | End: 2018-12-24

## 2018-07-11 RX ORDER — GADOBUTROL 604.72 MG/ML
10 INJECTION INTRAVENOUS ONCE
Status: COMPLETED | OUTPATIENT
Start: 2018-07-11 | End: 2018-07-11

## 2018-07-11 RX ADMIN — GADOBUTROL 8 ML: 604.72 INJECTION INTRAVENOUS at 12:27

## 2018-07-11 ASSESSMENT — PAIN SCALES - GENERAL: PAINLEVEL: NO PAIN (0)

## 2018-07-11 NOTE — MR AVS SNAPSHOT
"              After Visit Summary   2018    Loy Limon    MRN: [id number]           Patient Information     Date Of Birth          1955        Visit Information        Provider Department      2018 1:45 PM Reyes, Rebecca; Praveena Monk MD [facility]        Today's Diagnoses     Equivocal stress echocardiography    -  1    Cirrhosis (H)        Hepatocellular carcinoma (H)        Abnormal result of other cardiovascular function study (CODE)           Care Instructions    You were seen today in the Cardiovascular Clinic at the [facility].     Cardiology Providers you saw during your visit: Praveena Monk MD     Diagnosis:   Encounter Diagnoses   Name Primary?     Equivocal stress echocardiography Yes     Cirrhosis (H)      Hepatocellular carcinoma (H)      Abnormal result of other cardiovascular function study (CODE)            Orders:   Orders Placed This Encounter   Procedures     CTA Angiogram coronary artery       Current Medication List  Current Outpatient Prescriptions   Medication Sig Dispense Refill     HYDROmorphone (DILAUDID) 2 MG tablet Take 1 tablet (2 mg) by mouth every 6 hours as needed for pain 20 tablet 0     NADOLOL PO Take 20 mg by mouth daily       omeprazole (PRILOSEC) 20 MG CR capsule Take 20 mg by mouth       XIFAXAN 550 MG TABS tablet        Ondansetron HCl (ZOFRAN PO) Take 4 mg by mouth       propranolol (INDERAL) 10 MG tablet Take 10 mg by mouth       traMADol (ULTRAM) 50 MG tablet Take 50 mg by mouth           Medications Discontinued:  There are no discontinued medications.      Recommendations:   1. CTA for cardiac clearance     Follow up with Provider - AS needed         Please feel free to call me with any questions or concerns.       Tracy Hollingsworth LPN     Questions: [phone].   First press #1 for the [employer] for \"Medical Questions\" to reach us Cardiology Nurses.     [phone].   First press #1 for the " [place] and then press #1     On Call Cardiologist for after hours or on weekends: [phone]   option #4 and ask to speak to the on-call Cardiologist.          If you need a medication refill please contact your pharmacy.  Please allow 3 business days for your refill to be completed.  ________________________________________________________________________________________________________________________________  CT Angiogram     1. Drink plenty of water the night before and 2 hours prior to the test to hydrate.   2. Nothing to eat 3 hours prior.  3. Avoid caffeine, smoking, or strenuous activity on the day of the test.  4. You will receive contrast, so plan to drink extra water the day before and after your test.  5. If you take any of the following medications, please HOLD the day of:   * Metformin or Glucophage the morning of and wait 48 hours before re-starting     * Diuretic the day of. (Examples: Furosemide (Lasix), Torsemide, Bumetanide (Bumex), Metolazone (Zaroxolyn) and Hydrochlorothiazide.)     * NSAIDS (Examples: ibuprofen/Advil/Motrin, naproxen/Aleve) the day of.     * Erectile dysfunction medications (Examples: Viagra, Cialis, Levitra).    5. Please allow 2 hours of time for preparation and testing.      If you have further questions, please utilize RebelMailt to contact us.   If your question concerns the above instructions, contact:  Tracy Hollingsworth LPN  Nurse Care Coordinator- Heart Care  [phone]    If your question concerns the schedule/appointment times, contact:  [phone]              Follow-ups after your visit        Your next 10 appointments already scheduled     Jul 13, 2018  8:45 AM CDT   Return Visit with Debora Ac MD   [facility] ([facility])    [address]  [address]  [place] [facility] [place] [address]   [phone]            Jul 13, 2018 11:15 AM CDT   New Patient Visit with Khloe Torres MD   [facility]  and [facility] ([facility])    [address]  4th Floor  [facility] [id number] [phone]            Jul 25, 2018  8:00 AM CDT   CTA ANGIOGRAM CORONARY ARTERY with UUCT4, UU CV CT NURSE   [facility] [place] [place] ([facility])    [address] [id number] [phone]           Please allow two hours for this test.  Follow the instructions below:   The day before your exam, drink extra fluids at least six 8-ounce glasses (unless your doctor wants you to restrict your fluids).   No caffeine and no smoking the day of the test.   Do not eat or drink for 2 hours before your exam. You may take your morning medicines with small sips of water.   If you take Viagra, Levitra or Cialis, stop taking it for 48 hours before your test.  For patients with diabetes:   You may need a blood test (creatinine test) within 30 days of your exam; the Cardiologist/Radiologist may require you to get this test prior to your exam   If you are diabetic and take oral hypoglycemics, do not take them on the day of your test.  Bring any scans or X-rays taken at other hospitals, if similar tests were done. Also bring a list of your medicines, including vitamins, minerals and over-the-counter drugs. It is safest to leave personal items at home.  Be sure to tell your doctor:   If you have any allergies, including any reaction to contrast.   If you are breastfeeding or there is a chance you are pregnant.  Please wear loose clothing, such as a sweat suit or jogging clothes. Avoid snaps, zippers and other metal. We may ask you to undress and put on a hospital gown.  If you have any questions, please call the Imaging Department where you will have your exam.            Aug 14, 2018  8:30 AM CDT   (Arrive by 8:15 AM)   Return General Liver with Tamela Carballo MD   [facility] ([facility])    [address]  [address]  [place] [address]   [phone]            Aug 14, 2018  6:00 PM CDT   (Arrive by 5:45 PM)   New Patient Visit with Lesly Zazueta MD   [facility] ([facility])    [address]  [address]  [place] [address]   [phone]            Oct 03, 2018  8:30 AM CDT   MR ABDOMEN W/O & W CONTRAST with UCMR1   [facility] MRI ([facility])    [address]  1st Floor  [place] [address]   [phone]           Take your medicines as usual, unless your doctor tells you not to. Bring a list of your current medicines to your exam (including vitamins, minerals and over-the-counter drugs). Also bring the results of similar scans you may have had.    You may or may not receive IV contrast for this exam pending the discretion of the Radiologist.   Do not eat or drink for 6 hours prior to exam.  The MRI machine uses a strong magnet. Please wear clothes without metal (snaps, zippers). A sweatsuit works well, or we may give you a hospital gown.  Please remove any body piercings and hair extensions before you arrive. You will also remove watches, jewelry, hairpins, wallets, dentures, partial dental plates and hearing aids. You may wear contact lenses, and you may be able to wear your rings. We have a safe place to keep your personal items, but it is safer to leave them at home.  **IMPORTANT** THE INSTRUCTIONS BELOW ARE ONLY FOR THOSE PATIENTS WHO HAVE BEEN PRESCRIBED SEDATION OR GENERAL ANESTHESIA DURING THEIR MRI PROCEDURE:  IF YOUR DOCTOR PRESCRIBED ORAL SEDATION (take medicine to help you relax during your exam):   You must get the medicine from your doctor (oral medication) before you arrive. Bring the medicine to the exam. Do not take it at home. You ll be told when to take it upon arriving for your exam.   Arrive one hour early. Bring someone who can  take you home after the test. Your medicine will make you sleepy. After the exam, you may not drive, take a bus or take a taxi by yourself.  IF YOUR DOCTOR PRESCRIBED IV SEDATION:   Arrive one hour early. Bring someone who can take you home after the test. Your medicine will make you sleepy. After the exam, you may not drive, take a bus or take a taxi by yourself.   No eating 6 hours before your exam. You may have clear liquids up until 4 hours before your exam. (Clear liquids include water, clear tea, black coffee and fruit juice without pulp.)  IF YOUR DOCTOR PRESCRIBED ANESTHESIA (be asleep for your exam):   Arrive 1 1/2 hours early. Bring someone who can take you home after the test. You may not drive, take a bus or take a taxi by yourself.   No eating 8 hours before your exam. You may have clear liquids up until 4 hours before your exam. (Clear liquids include water, clear tea, black coffee and fruit juice without pulp.)   You will spend four to five hours in the recovery room.  If you have any questions, please contact your Imaging Department exam site.            Oct 03, 2018  9:20 AM CDT   CT CHEST W/O CONTRAST with UCCT1   [facility] [place] ([facility])    [address] [address] [phone]           Please bring any scans or X-rays taken at other hospitals, if similar tests were done. Also bring a list of your medicines, including vitamins, minerals and over-the-counter drugs. It is safest to leave personal items at home.  Be sure to tell your doctor:   If you have any allergies.   If there s any chance you are pregnant.   If you are breastfeeding.  You do not need to do anything special to prepare for this exam.  Please wear loose clothing, such as a sweat suit or jogging clothes. Avoid snaps, zippers and other metal. We may ask you to undress and put on a hospital gown.            Oct 03, 2018  9:45 AM CDT   Lab with  LAB   [facility] ([facility])    [address]  1st [address]  [place] [id number] [phone]            Oct 04, 2018  4:00 PM CDT   (Arrive by 3:45 PM)   Return Visit with Hi Melgar MD   [facility] ([facility])    [address]  [address]  [place] [id number] [phone]              Future tests that were ordered for you today     Open Future Orders        Priority Expected Expires Ordered    CTA Angiogram coronary artery Routine 2018            Who to contact     If you have questions or need follow up information about today's clinic visit or your schedule please contact [facility] directly at [phone].  Normal or non-critical lab and imaging results will be communicated to you by Jut Inchart, letter or phone within 4 business days after the clinic has received the results. If you do not hear from us within 7 days, please contact the clinic through Jut Inchart or phone. If you have a critical or abnormal lab result, we will notify you by phone as soon as possible.  Submit refill requests through Anedot or call your pharmacy and they will forward the refill request to us. Please allow 3 business days for your refill to be completed.          Additional Information About Your Visit        Anedot Information     Anedot lets you send messages to your doctor, view your test results, renew your prescriptions, schedule appointments and more. To sign up, go to [email or web address] . Click on \"Log in\" on the left side of the screen, which will take you to the Welcome page. Then click on \"Sign up Now\" on the right side of the page.     You will be asked to enter the access code listed below, as well as some personal information. Please follow the directions to create your username and password.     Your access code is: [email or web address]  Expires: 2018  8:03 AM     Your access code will  " "in 90 days. If you need help or a new code, please call your [place] clinic or [phone].        Care EveryWhere ID     This is your Care EveryWhere ID. This could be used by other organizations to access your [place] medical records  [id number]        Your Vitals Were     Pulse Height Pulse Oximetry BMI (Body Mass Index)          50 1.676 m (5' 6\") 100% 28.55 kg/m2         Blood Pressure from Last 3 Encounters:   07/11/18 143/69   07/02/18 153/73   06/14/18 124/73    Weight from Last 3 Encounters:   07/11/18 80.2 kg (176 lb 14.4 oz)   07/02/18 80.6 kg (177 lb 11.1 oz)   06/13/18 81.6 kg (180 lb)               Primary Care Provider    [facility] MD       [address] [facility] [address]  [facility] [address]        Equal Access to Services     [facility] : Hadii kostas aj hadasho Sodaphney, waaxda luqadaha, qaybta kaalmada adeegyada, javier rico . So [facility] [phone].    ATENCIÓN: Si habla español, tiene a samaniego disposición servicios gratuitos de asistencia lingüística. Earle al [phone].    We comply with applicable federal civil rights laws and [place] laws. We do not discriminate on the basis of race, color, national origin, age, disability, sex, sexual orientation, or gender identity.            Thank you!     Thank you for choosing [facility]  for your care. Our goal is always to provide you with excellent care. Hearing back from our patients is one way we can continue to improve our services. Please take a few minutes to complete the written survey that you may receive in the mail after your visit with us. Thank you!             Your Updated Medication List - Protect others around you: Learn how to safely use, store and throw away your medicines at www.disposemymeds.org.          This list is accurate as of 7/11/18  3:02 PM.  Always use your most recent med list.                   Brand Name Dispense Instructions for use Diagnosis    HYDROmorphone 2 MG " tablet    DILAUDID    20 tablet    Take 1 tablet (2 mg) by mouth every 6 hours as needed for pain    HCC (hepatocellular carcinoma) (H)       NADOLOL PO      Take 20 mg by mouth daily        omeprazole 20 MG CR capsule    priLOSEC     Take 20 mg by mouth        propranolol 10 MG tablet    INDERAL     Take 10 mg by mouth    Cirrhosis (H), Pulmonary nodules       traMADol 50 MG tablet    ULTRAM     Take 50 mg by mouth        XIFAXAN 550 MG Tabs tablet   Generic drug:  rifaximin           ZOFRAN PO      Take 4 mg by mouth

## 2018-07-11 NOTE — PROGRESS NOTES
CARDIOLOGY CONSULTATION    Referring Provider:  Tamela Herr  Primary Care Provider:   [facility], On[facility]  Indication for Consultation: CV risk with liver transplant.    HPI: Loy Limon is a 63 year old male being seen today for evaluation of cardiovascular risk with liver transplant surgery.     The patient has no history of cardiovascular disease (CAD, CHF, arrhythmia, valvular heart disease).  The patient has no Hx of PAD or cerebrovascular disease.  The patient has not undergone invasive cardiovascular evaluation such as cardiac catheterization.   The patient denies a history of chest discomfort, dyspnea, PND, orthopnea, , palpitations, lightheadedness, and syncope.    He has mild pedal edema that worries him due to connection to liver problem but does not limit him. 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 At his work he needs to walk 2 flight of stairs to the break room and he will do this 4-5 times each day at work. Not lifting heavy weight at work and has limited work hours due to medical condition. His employer is very supportive of his medical needs.     No bleeding issues    PAST MEDICAL HISTORY:  Past Medical History:   Diagnosis Date     Cancer (H)     hepatocellualr carcinoma     Cirrhosis of liver (H) 5/8/2018     Enlarged prostate      Inguinal hernia      Liver lesion 5/8/2018       CURRENT MEDICATIONS:  Current Outpatient Prescriptions   Medication Sig Dispense Refill     HYDROmorphone (DILAUDID) 2 MG tablet Take 1 tablet (2 mg) by mouth every 6 hours as needed for pain 20 tablet 0     NADOLOL PO Take 20 mg by mouth daily       omeprazole (PRILOSEC) 20 MG CR capsule Take 20 mg by mouth       XIFAXAN 550 MG TABS tablet        Ondansetron HCl (ZOFRAN PO) Take 4 mg by mouth       propranolol (INDERAL) 10 MG tablet Take 10 mg by mouth       traMADol (ULTRAM) 50 MG tablet Take 50 mg by mouth         PAST SURGICAL HISTORY:  Past Surgical  "History:   Procedure Laterality Date     APPENDECTOMY       COLONOSCOPY      2018       ALLERGIES  Review of patient's allergies indicates no known allergies.    FAMILY HX:  No family history on file.    SOCIAL HX:  Social History     Social History     Marital status:      Spouse name: N/A     Number of children: N/A     Years of education: N/A     Social History Main Topics     Smoking status: Former Smoker     Packs/day: 0.03     Years: 20.00     Types: Cigarettes, Cigars     Smokeless tobacco: Never Used      Comment: Quit smoking Feb 2018, one cigarette after dinner     Alcohol use Yes      Comment: Quit drinking Feb 2018     Drug use: No     Sexual activity: Not Asked     Other Topics Concern     None     Social History Narrative       ROS:  Constitutional: No fever, chills, or sweats. No weight gain/loss.   ENT: No epistaxis  Allergies/Immunologic: Negative.   Respiratory: No hemoptysis.   Cardiovascular: As per HPI.   GI: No hematemesis, melena, or hematochezia.   : No hematuria.   Integument: Negative.   Psychiatric: Negative.   Neuro: Negative.   Endocrinology: Negative.   Musculoskeletal: No myalgia.    VITAL SIGNS:  /69 (BP Location: Left arm, Patient Position: Chair, Cuff Size: Adult Regular)  Pulse 50  Ht 1.676 m (5' 6\")  Wt 80.2 kg (176 lb 14.4 oz)  SpO2 100%  BMI 28.55 kg/m2  Body mass index is 28.55 kg/(m^2).  Wt Readings from Last 2 Encounters:   07/11/18 80.2 kg (176 lb 14.4 oz)   07/02/18 80.6 kg (177 lb 11.1 oz)       PHYSICAL EXAM  Loy Limon is a 63 year old male in no acute distress.  HEENT: Unremarkable.  Neck: JVP normal.  Carotids +4/4 bilaterally without bruits.  Lungs: CTA.  Cor: RRR. Normal S1 and S2.  No murmur, rub, or gallop.  PMI in Lf 5th ICS.  Abd: Soft, nontender, nondistended.  NABS.  No pulsatile mass.  Extremities: No C/C/E.  Pulses +4/4 symmetric in upper and lower extremities.  Neuro: Grossly intact.    LABS    Lab Results   Component Value Date    " WBC 3.4 07/11/2018     Lab Results   Component Value Date    RBC 3.48 07/11/2018     Lab Results   Component Value Date    HGB 11.8 07/11/2018     Lab Results   Component Value Date    HCT 34.2 07/11/2018     No components found for: MCT  Lab Results   Component Value Date    MCV 98 07/11/2018     Lab Results   Component Value Date    MCH 33.9 07/11/2018     Lab Results   Component Value Date    MCHC 34.5 07/11/2018     Lab Results   Component Value Date    RDW 13.9 07/11/2018     Lab Results   Component Value Date    PLT 81 07/11/2018      Recent Labs   Lab Test  07/11/18   1302  07/02/18   1011   NA  144  143   POTASSIUM  3.8  4.8   CHLORIDE  111*  113*   CO2  26  20   ANIONGAP  7  10   GLC  107*  168*   BUN  10  10   CR  0.50*  0.60*   YELENA  8.5  8.2*     No results for input(s): CHOL, HDL, LDL, TRIG, CHOLHDLRATIO, NHDL in the last 22602 hours.     STRESS TEST (personally reviewed by me):  Interpretation Summary 6/20/18  Non diagnostic dobutamine stress echo due to failure to meet target heart  rate. Test was terminated at a HR of 62 bpm (40% age-predicted max HR) due to  hypertensive response to dobutamine (/73->266/102.)  Pulmonary hypertension is present (RVSP 46 mmHg above RA pressure.)     Blunted HR amd normal BP response to dobutamine.  Hyperdynamic left ventricular function at rest, LVEF=65-70%.  No regional wall motion abnormalities were present at rest or with dobutamine  at a below-diagnostic workload.  No ECG evidence of ischemia at a below-diagnostic workload.    CARDIAC CATH:  Never    ASSESSMENT AND PLAN:    1. Equivocal stress echocardiography    2. Cirrhosis (H)    3. Hepatocellular carcinoma (H)    4. Abnormal result of other cardiovascular function study (CODE)         63 year old male with good function status and low risk for CV events with surgery who had hypertensive response to dobutamine stress test without increase in heart rate. Rate pressure product was about 16K and hyperdynamic  wall motion was recorded without segmental wall motion abnormality.     Patients recent CT chest showed presence of coronary calcium but patient without angina and no wall motion abnormality with hyperdynamic function at peak stress. Plans for CTA with FFR to exclude significant stenosis and complete preop eval. Patient without renal dysfunction or allergy.     Based on presence of CAD he might be better off with aspirin/statin therapy but this could be started after surgery by his PCP.     Follow up with cardiology as needed based on symptoms.     Praveena Monk MD    Division of Cardiology  [facility] [address] [address] [phone] Appointments  [phone] Fax  [phone] After hours    Clinic nurse:  Tracy Hollingsworth LPN   Nurse Care Coordinator- Heart Care   [phone] option 1, than option 3    Academic Mailing address:  [facility]  Department of Internal Medicine () [address] [place] [address]

## 2018-07-11 NOTE — NURSING NOTE
Cardiac Testing: CTA. Patient given instructions regarding   Discussed purpose, preparation, procedure and when to expect results reported back to the patient. Patient demonstrated understanding of this information and agreed to call with further questions or concerns.  Patient stated he understood all health information given and agreed to call with further questions or concerns.

## 2018-07-11 NOTE — LETTER
7/11/2018      RE: Loy Limon  [address]  [facility] [address]       Dear Colleague,    Thank you for the opportunity to participate in the care of your patient, Loy Limon, at the [facility] at [facility]. Please see a copy of my visit note below.    CARDIOLOGY CONSULTATION    Referring Provider:  Tamela Marquez*  Primary Care Provider:   [facility], Oncology  Indication for Consultation: CV risk with liver transplant.    HPI: Loy Limon is a 63 year old male being seen today for evaluation of cardiovascular risk with liver transplant surgery.     The patient has no history of cardiovascular disease (CAD, CHF, arrhythmia, valvular heart disease).  The patient has no Hx of PAD or cerebrovascular disease.  The patient has not undergone invasive cardiovascular evaluation such as cardiac catheterization.   The patient denies a history of chest discomfort, dyspnea, PND, orthopnea, , palpitations, lightheadedness, and syncope.    He has mild pedal edema that worries him due to connection to liver problem but does not limit him. 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 At his work he needs to walk 2 flight of stairs to the break room and he will do this 4-5 times each day at work. Not lifting heavy weight at work and has limited work hours due to medical condition. His employer is very supportive of his medical needs.     No bleeding issues    PAST MEDICAL HISTORY:  Past Medical History:   Diagnosis Date     Cancer (H)     hepatocellualr carcinoma     Cirrhosis of liver (H) 5/8/2018     Enlarged prostate      Inguinal hernia      Liver lesion 5/8/2018       CURRENT MEDICATIONS:  Current Outpatient Prescriptions   Medication Sig Dispense Refill     HYDROmorphone (DILAUDID) 2 MG tablet Take 1 tablet (2 mg) by mouth every 6 hours as needed for pain 20 tablet 0     NADOLOL PO Take 20 mg by mouth  "daily       omeprazole (PRILOSEC) 20 MG CR capsule Take 20 mg by mouth       XIFAXAN 550 MG TABS tablet        Ondansetron HCl (ZOFRAN PO) Take 4 mg by mouth       propranolol (INDERAL) 10 MG tablet Take 10 mg by mouth       traMADol (ULTRAM) 50 MG tablet Take 50 mg by mouth         PAST SURGICAL HISTORY:  Past Surgical History:   Procedure Laterality Date     APPENDECTOMY       COLONOSCOPY      2018       ALLERGIES  Review of patient's allergies indicates no known allergies.    FAMILY HX:  No family history on file.    SOCIAL HX:  Social History     Social History     Marital status:      Spouse name: N/A     Number of children: N/A     Years of education: N/A     Social History Main Topics     Smoking status: Former Smoker     Packs/day: 0.03     Years: 20.00     Types: Cigarettes, Cigars     Smokeless tobacco: Never Used      Comment: Quit smoking Feb 2018, one cigarette after dinner     Alcohol use Yes      Comment: Quit drinking Feb 2018     Drug use: No     Sexual activity: Not Asked     Other Topics Concern     None     Social History Narrative       ROS:  Constitutional: No fever, chills, or sweats. No weight gain/loss.   ENT: No epistaxis  Allergies/Immunologic: Negative.   Respiratory: No hemoptysis.   Cardiovascular: As per HPI.   GI: No hematemesis, melena, or hematochezia.   : No hematuria.   Integument: Negative.   Psychiatric: Negative.   Neuro: Negative.   Endocrinology: Negative.   Musculoskeletal: No myalgia.    VITAL SIGNS:  /69 (BP Location: Left arm, Patient Position: Chair, Cuff Size: Adult Regular)  Pulse 50  Ht 1.676 m (5' 6\")  Wt 80.2 kg (176 lb 14.4 oz)  SpO2 100%  BMI 28.55 kg/m2  Body mass index is 28.55 kg/(m^2).  Wt Readings from Last 2 Encounters:   07/11/18 80.2 kg (176 lb 14.4 oz)   07/02/18 80.6 kg (177 lb 11.1 oz)       PHYSICAL EXAM  Loy Limon is a 63 year old male in no acute distress.  HEENT: Unremarkable.  Neck: JVP normal.  Carotids +4/4 bilaterally " without bruits.  Lungs: CTA.  Cor: RRR. Normal S1 and S2.  No murmur, rub, or gallop.  PMI in Lf 5th ICS.  Abd: Soft, nontender, nondistended.  NABS.  No pulsatile mass.  Extremities: No C/C/E.  Pulses +4/4 symmetric in upper and lower extremities.  Neuro: Grossly intact.    LABS    Lab Results   Component Value Date    WBC 3.4 07/11/2018     Lab Results   Component Value Date    RBC 3.48 07/11/2018     Lab Results   Component Value Date    HGB 11.8 07/11/2018     Lab Results   Component Value Date    HCT 34.2 07/11/2018     No components found for: MCT  Lab Results   Component Value Date    MCV 98 07/11/2018     Lab Results   Component Value Date    MCH 33.9 07/11/2018     Lab Results   Component Value Date    MCHC 34.5 07/11/2018     Lab Results   Component Value Date    RDW 13.9 07/11/2018     Lab Results   Component Value Date    PLT 81 07/11/2018      Recent Labs   Lab Test  07/11/18   1302  07/02/18   1011   NA  144  143   POTASSIUM  3.8  4.8   CHLORIDE  111*  113*   CO2  26  20   ANIONGAP  7  10   GLC  107*  168*   BUN  10  10   CR  0.50*  0.60*   YELENA  8.5  8.2*     No results for input(s): CHOL, HDL, LDL, TRIG, CHOLHDLRATIO, NHDL in the last 89748 hours.     STRESS TEST (personally reviewed by me):  Interpretation Summary 6/20/18  Non diagnostic dobutamine stress echo due to failure to meet target heart  rate. Test was terminated at a HR of 62 bpm (40% age-predicted max HR) due to  hypertensive response to dobutamine (/73->266/102.)  Pulmonary hypertension is present (RVSP 46 mmHg above RA pressure.)     Blunted HR amd normal BP response to dobutamine.  Hyperdynamic left ventricular function at rest, LVEF=65-70%.  No regional wall motion abnormalities were present at rest or with dobutamine  at a below-diagnostic workload.  No ECG evidence of ischemia at a below-diagnostic workload.    CARDIAC CATH:  Never    ASSESSMENT AND PLAN:    1. Equivocal stress echocardiography    2. Cirrhosis (H)    3.  Hepatocellular carcinoma (H)    4. Abnormal result of other cardiovascular function study (CODE)         63 year old male with good function status and low risk for CV events with surgery who had hypertensive response to dobutamine stress test without increase in heart rate. Rate pressure product was about 16K and hyperdynamic wall motion was recorded without segmental wall motion abnormality.     Patients recent CT chest showed presence of coronary calcium but patient without angina and no wall motion abnormality with hyperdynamic function at peak stress. Plans for CTA with FFR to exclude significant stenosis and complete preop eval. Patient without renal dysfunction or allergy.     Based on presence of CAD he might be better off with aspirin/statin therapy but this could be started after surgery by his PCP.     Follow up with cardiology as needed based on symptoms.     Praveena Monk MD    Division of Cardiology  [facility] [place] [facility] [address] [address] [phone] Appointments  [phone] Fax  [phone] After hours    Clinic nurse:  Tracy Hollingsworth LPN   Nurse Care Coordinator- Heart Care   [phone] option 1, than option 3    Academic Mailing address:  [facility]  Department of Internal Medicine [facility] [phone] [place] [address]

## 2018-07-11 NOTE — NURSING NOTE
Chief Complaint   Patient presents with     New Patient     64 yo male present for pre liver cardiac clearance        Vitals were taken and medications were reconciled.     Slime Wayne MA    2:02 PM

## 2018-07-11 NOTE — PATIENT INSTRUCTIONS
"You were seen today in the Cardiovascular Clinic at the [facility].     Cardiology Providers you saw during your visit: Praveena Monk MD     Diagnosis:   Encounter Diagnoses   Name Primary?     Equivocal stress echocardiography Yes     Cirrhosis (H)      Hepatocellular carcinoma (H)      Abnormal result of other cardiovascular function study (CODE)            Orders:   Orders Placed This Encounter   Procedures     CTA Angiogram coronary artery       Current Medication List  Current Outpatient Prescriptions   Medication Sig Dispense Refill     HYDROmorphone (DILAUDID) 2 MG tablet Take 1 tablet (2 mg) by mouth every 6 hours as needed for pain 20 tablet 0     NADOLOL PO Take 20 mg by mouth daily       omeprazole (PRILOSEC) 20 MG CR capsule Take 20 mg by mouth       XIFAXAN 550 MG TABS tablet        Ondansetron HCl (ZOFRAN PO) Take 4 mg by mouth       propranolol (INDERAL) 10 MG tablet Take 10 mg by mouth       traMADol (ULTRAM) 50 MG tablet Take 50 mg by mouth           Medications Discontinued:  There are no discontinued medications.      Recommendations:   1. CTA for cardiac clearance     Follow up with Provider - AS needed         Please feel free to call me with any questions or concerns.       Tracy Hollingsworth LPN     Questions: [phone].   First press #1 for the [employer] for \"Medical Questions\" to reach us Cardiology Nurses.     [phone].   First press #1 for the [employer] and then press #1     On Call Cardiologist for after hours or on weekends: [phone]   option #4 and ask to speak to the on-call Cardiologist.          If you need a medication refill please contact your pharmacy.  Please allow 3 business days for your refill to be completed.  ________________________________________________________________________________________________________________________________  CT Angiogram     1. Drink plenty of water the night before and 2 hours prior to the test to " hydrate.   2. Nothing to eat 3 hours prior.  3. Avoid caffeine, smoking, or strenuous activity on the day of the test.  4. You will receive contrast, so plan to drink extra water the day before and after your test.  5. If you take any of the following medications, please HOLD the day of:   * Metformin or Glucophage the morning of and wait 48 hours before re-starting     * Diuretic the day of. (Examples: Furosemide (Lasix), Torsemide, Bumetanide (Bumex), Metolazone (Zaroxolyn) and Hydrochlorothiazide.)     * NSAIDS (Examples: ibuprofen/Advil/Motrin, naproxen/Aleve) the day of.     * Erectile dysfunction medications (Examples: Viagra, Cialis, Levitra).    5. Please allow 2 hours of time for preparation and testing.      If you have further questions, please utilize [employer] to contact us.   If your question concerns the above instructions, contact:  Tracy Hollingsworth LPN  Nurse Care Coordinator- Heart Care  [phone]    If your question concerns the schedule/appointment times, contact:  [phone]

## 2018-07-13 ENCOUNTER — OFFICE VISIT (OUTPATIENT)
Dept: RADIOLOGY | Facility: CLINIC | Age: 63
End: 2018-07-13
Attending: RADIOLOGY
Payer: MEDICARE

## 2018-07-13 ENCOUNTER — OFFICE VISIT (OUTPATIENT)
Dept: UROLOGY | Facility: CLINIC | Age: 63
End: 2018-07-13
Attending: INTERNAL MEDICINE
Payer: MEDICARE

## 2018-07-13 VITALS
WEIGHT: 182 LBS | DIASTOLIC BLOOD PRESSURE: 73 MMHG | HEIGHT: 66 IN | HEART RATE: 51 BPM | SYSTOLIC BLOOD PRESSURE: 142 MMHG | BODY MASS INDEX: 29.25 KG/M2

## 2018-07-13 VITALS
SYSTOLIC BLOOD PRESSURE: 142 MMHG | OXYGEN SATURATION: 99 % | DIASTOLIC BLOOD PRESSURE: 68 MMHG | HEIGHT: 66 IN | RESPIRATION RATE: 14 BRPM | BODY MASS INDEX: 29.35 KG/M2 | WEIGHT: 182.6 LBS | TEMPERATURE: 97.3 F | HEART RATE: 55 BPM

## 2018-07-13 DIAGNOSIS — R35.1 NOCTURIA: Primary | ICD-10-CM

## 2018-07-13 DIAGNOSIS — C22.0 HCC (HEPATOCELLULAR CARCINOMA) (H): Primary | ICD-10-CM

## 2018-07-13 PROCEDURE — G0463 HOSPITAL OUTPT CLINIC VISIT: HCPCS | Mod: ZF

## 2018-07-13 PROCEDURE — T1013 SIGN LANG/ORAL INTERPRETER: HCPCS | Mod: U3,ZF

## 2018-07-13 RX ORDER — ALFUZOSIN HYDROCHLORIDE 10 MG/1
10 TABLET, EXTENDED RELEASE ORAL DAILY
Qty: 30 TABLET | Refills: 3 | Status: SHIPPED | OUTPATIENT
Start: 2018-07-13 | End: 2018-08-07

## 2018-07-13 ASSESSMENT — ENCOUNTER SYMPTOMS
BLOATING: 1
WEIGHT GAIN: 1
DOUBLE VISION: 0
POLYPHAGIA: 0
HOARSE VOICE: 1
LIGHT-HEADEDNESS: 0
SWOLLEN GLANDS: 0
LOSS OF CONSCIOUSNESS: 0
WEAKNESS: 0
SINUS PAIN: 0
FATIGUE: 0
TREMORS: 0
DECREASED CONCENTRATION: 1
SINUS CONGESTION: 1
NERVOUS/ANXIOUS: 0
BLOOD IN STOOL: 0
PANIC: 0
NECK MASS: 0
RECTAL PAIN: 0
WEIGHT LOSS: 0
ORTHOPNEA: 0
DISTURBANCES IN COORDINATION: 0
JAUNDICE: 1
ALTERED TEMPERATURE REGULATION: 0
SPEECH CHANGE: 0
NUMBNESS: 1
SKIN CHANGES: 0
EYE WATERING: 1
FLANK PAIN: 0
HYPOTENSION: 0
HEADACHES: 0
EYE PAIN: 0
SYNCOPE: 0
NAIL CHANGES: 1
BOWEL INCONTINENCE: 0
TINGLING: 1
PARALYSIS: 0
SLEEP DISTURBANCES DUE TO BREATHING: 0
SORE THROAT: 0
DIZZINESS: 0
BRUISES/BLEEDS EASILY: 0
FEVER: 0
DECREASED APPETITE: 0
EXERCISE INTOLERANCE: 0
POLYDIPSIA: 0
HALLUCINATIONS: 0
VOMITING: 0
HEARTBURN: 1
NAUSEA: 1
EYE IRRITATION: 0
NIGHT SWEATS: 0
SMELL DISTURBANCE: 0
HEMATURIA: 0
POOR WOUND HEALING: 0
INCREASED ENERGY: 0
LEG PAIN: 0
ABDOMINAL PAIN: 1
DIFFICULTY URINATING: 0
SEIZURES: 0
TROUBLE SWALLOWING: 0
DEPRESSION: 0
EYE REDNESS: 0
CHILLS: 0
HYPERTENSION: 0
DYSURIA: 0
INSOMNIA: 0
TASTE DISTURBANCE: 0
PALPITATIONS: 0
CONSTIPATION: 0
MEMORY LOSS: 0

## 2018-07-13 ASSESSMENT — PAIN SCALES - GENERAL
PAINLEVEL: NO PAIN (0)
PAINLEVEL: NO PAIN (0)

## 2018-07-13 NOTE — NURSING NOTE
"Chief Complaint   Patient presents with     Consult     BPH - transplant clearance       Blood pressure 142/73, pulse 51, height 1.676 m (5' 5.97\"), weight 82.6 kg (182 lb). Body mass index is 29.4 kg/(m^2).    Patient Active Problem List   Diagnosis     Cirrhosis of liver (H)     Liver lesion     HCC (hepatocellular carcinoma) (H)       No Known Allergies    Current Outpatient Prescriptions   Medication Sig Dispense Refill     HYDROmorphone (DILAUDID) 2 MG tablet Take 1 tablet (2 mg) by mouth every 6 hours as needed for pain 20 tablet 0     NADOLOL PO Take 20 mg by mouth daily       omeprazole (PRILOSEC) 20 MG CR capsule Take 20 mg by mouth       Ondansetron HCl (ZOFRAN PO) Take 4 mg by mouth       propranolol (INDERAL) 10 MG tablet Take 10 mg by mouth       traMADol (ULTRAM) 50 MG tablet Take 50 mg by mouth       XIFAXAN 550 MG TABS tablet          Social History   Substance Use Topics     Smoking status: Former Smoker     Packs/day: 0.03     Years: 20.00     Types: Cigarettes, Cigars     Smokeless tobacco: Never Used      Comment: Quit smoking Feb 2018, one cigarette after dinner     Alcohol use Yes      Comment: Quit drinking Feb 2018       NIRMAL Kohli  7/13/2018  11:45 AM       "

## 2018-07-13 NOTE — PATIENT INSTRUCTIONS
Take medication as directed.    Return in 6 weeks with a full bladder for a urine flow test.    It was a pleasure meeting with you today.  Thank you for allowing me and my team the privilege of caring for you today.  YOU are the reason we are here, and I truly hope we provided you with the excellent service you deserve.  Please let us know if there is anything else we can do for you so that we can be sure you are leaving completely satisfied with your care experience.

## 2018-07-13 NOTE — NURSING NOTE
"Oncology Rooming Note    July 13, 2018 9:11 AM   Loy Limon is a 63 year old male who presents for:    Chief Complaint   Patient presents with     Oncology Clinic Visit     Return : HCC     Initial Vitals: /68 (BP Location: Right arm, Patient Position: Sitting, Cuff Size: Adult Regular)  Pulse 55  Temp 97.3  F (36.3  C) (Oral)  Resp 14  Ht 1.676 m (5' 5.98\")  Wt 82.8 kg (182 lb 9.6 oz)  SpO2 99%  BMI 29.49 kg/m2 Estimated body mass index is 29.49 kg/(m^2) as calculated from the following:    Height as of this encounter: 1.676 m (5' 5.98\").    Weight as of this encounter: 82.8 kg (182 lb 9.6 oz). Body surface area is 1.96 meters squared.  No Pain (0) Comment: Data Unavailable   No LMP for male patient.  Allergies reviewed: Yes  Medications reviewed: Yes    Medications: MEDICATION REFILLS NEEDED TODAY. Provider was notified.  Pharmacy name entered into EPIC: [facility] [place], [place] - [address]    Clinical concerns: Patient is in need of a refill of his Nadolol. Patient also states that his ankles and feet keep swelling up. He states that Dr. Carballo prescribed a water retention medication but when the patient went to go fill it, and there was nothing on file. Dr. cA was notified.    10 minutes for nursing intake (face to face time)     Kayla Barrera CMA              "

## 2018-07-13 NOTE — PROGRESS NOTES
Interventional Radiology Clinic Visit    Date of this visit: 7/13/2018    Loy Limon returns for follow up post microwave ablation of residual mass in hepatic segment 8 on 7/2/2018. The same lesion was also chemoembolized on 6/13/2018.      Primary Physician: [facility], Oncology        History Of Present Illness:    Loy Limon is a 63 year old male who is here for follow-up. He has a diagnosis of HCC on a background of Etoh Cirrhosis. He had a mass measuring 3.7 cm in segment 8 which was first chemoembolized on 6/13/2018. On the lien-CT performed during the procedure, there was a portion of the mass which was not stained. The patient was called back for microwave ablation of the residual unstained medial portion of the mass and microwave ablated on 7/2/2018.    The patient feels well today. He had mild abdominal pain after the procedure and he took analgesics for 2 days, now pain free. Also after the ablation his ankle swelling slightly worsened. For ankle swelling, Dr. Carballo prescribed diuretics but patient could not get those because of insurance problems. He denies postprocedural fever, nausea, jaundice or confusion. His LFTs are stable. He has a recent MRI from 2 days ago, which does not show any evidence of residual tumor.        Review of Systems:    General: Negative for recent fever.  Skin: Negative for jaundice.  Eyes: Negative for jaundice.  Respiratory: Negative for shortness of breath.  Cardiovascular: Negative for chest pain.  Gastrointestinal: Negative for abdominal pain or swelling.  Musculoskeletal: Positive for ankle swelling.       Past Medical/Surgical History:    Past Medical History:   Diagnosis Date     Cancer (H)     hepatocellualr carcinoma     Cirrhosis of liver (H) 5/8/2018     Enlarged prostate      Inguinal hernia      Liver lesion 5/8/2018     Past Surgical History:   Procedure Laterality Date     APPENDECTOMY       COLONOSCOPY      2018       Current  Medications:    Current Outpatient Prescriptions   Medication Sig Dispense Refill     HYDROmorphone (DILAUDID) 2 MG tablet Take 1 tablet (2 mg) by mouth every 6 hours as needed for pain 20 tablet 0     NADOLOL PO Take 20 mg by mouth daily       omeprazole (PRILOSEC) 20 MG CR capsule Take 20 mg by mouth       Ondansetron HCl (ZOFRAN PO) Take 4 mg by mouth       propranolol (INDERAL) 10 MG tablet Take 10 mg by mouth       traMADol (ULTRAM) 50 MG tablet Take 50 mg by mouth       XIFAXAN 550 MG TABS tablet          Allergies:    Review of patient's allergies indicates no known allergies.    Family History:    No family history on file.    Social History:      Social History     Social History     Marital status:      Spouse name: N/A     Number of children: N/A     Years of education: N/A     Social History Main Topics     Smoking status: Former Smoker     Packs/day: 0.03     Years: 20.00     Types: Cigarettes, Cigars     Smokeless tobacco: Never Used      Comment: Quit smoking Feb 2018, one cigarette after dinner     Alcohol use Yes      Comment: Quit drinking Feb 2018     Drug use: No     Sexual activity: Not on file     Other Topics Concern     Not on file     Social History Narrative       Physical Exam:    There were no vitals taken for this visit.   GENERAL APPEARANCE: healthy, alert and no distress  PSYCH: Mentation and affect normal  EYES: No jaundice.  SKIN: No jaundice.   ABDOMEN:  soft, nontender, no masses and bowel sounds normal.  MUSCULOSKELETAL: Edema in the lower extremities    Laboratory Studies:    Lab Results   Component Value Date    HGB 11.8 07/11/2018     Lab Results   Component Value Date    PLT 81 07/11/2018     Lab Results   Component Value Date    WBC 3.4 07/11/2018       Lab Results   Component Value Date    INR 1.41 07/02/2018       Lab Results   Component Value Date    PROTTOTAL 6.5 07/11/2018      Lab Results   Component Value Date    ALBUMIN 2.4 07/11/2018     Lab Results    Component Value Date    BILITOTAL 2.2 07/11/2018      Lab Results   Component Value Date    ALKPHOS 270 07/11/2018     Lab Results   Component Value Date    AST 60 07/11/2018     Lab Results   Component Value Date    ALT 50 07/11/2018       Lab Results   Component Value Date    CR 0.50 07/11/2018     Lab Results   Component Value Date    BUN 10 07/11/2018       Alpha Fetoprotein   Date Value Ref Range Status   06/18/2018 3.8 0 - 8 ug/L Final     Comment:     Assay Method:  Chemiluminescence using Siemens Centaur XP       Imaging:     I reviewed the MRI from 7/11/2018. It shows resolution of contrast enhancement in the treated lesion. There is no significant change in LIRADS 4 lesion in segment 7 and LIRADS 3 lesions in segment 6 and 8.     ASSESSMENT/PLAN:      Loy Limon is a 63 year old male with HCC who is status post microwave ablation of residual lesion segment 8 and made an uneventful recovery. Follow up imaging shows resolution of contrast enhancement with no evidence of residual tumor. We discussed that he has other lesions in the liver which we need to keep eye on. We showed him the imaging and discussed the findings.    We planned to get another MRI in three months and clinic visit and he agreed with the plan.      A total of 25 minutes was spent in care for the patient.     It was a pleasure seeing the patient.     Signed,    Niru Cochran MD, Debora Del Rosario M.D.  Department of Interventional Radiology  [facility]      I, Dr Debora Del Rosario, was present with the fellow during the history and exam. I discussed the case with the fellow and agree with the findings as documented in the assessment and plan.        CC  Patient Care Team:  [facility], MD as PCP - General (Radiology)  Dario Anguiano MD as MD (Vascular and Interventional Radiology)  David Champagne MD (Gastroenterology)  Mathew Self MD as MD (Urology)  Sandrita Cano, RN as Registered Nurse  (Urology)  MARISSA ALBERT

## 2018-07-13 NOTE — PROGRESS NOTES
"It was my pleasure to see Loy Limon a 63 year old year old male today. Patient was seen in consultation for lower urinary tract symptoms.    HPI:     Patient presents for evaluation and management of BPH. Patient found to have enlarged prostate on  work up previously   Patient preparing for liver transplant and presents for clearance.     Nocturia 3x per night   Frequency up to 1.5-2 hours   Good stream of urine       Past Medical History:   Diagnosis Date     Cancer (H)     hepatocellualr carcinoma     Cirrhosis of liver (H) 5/8/2018     Enlarged prostate      Inguinal hernia      Liver lesion 5/8/2018       Past Surgical History:   Procedure Laterality Date     APPENDECTOMY       COLONOSCOPY      2018       No family history on file.    Current Outpatient Prescriptions   Medication Sig Dispense Refill     HYDROmorphone (DILAUDID) 2 MG tablet Take 1 tablet (2 mg) by mouth every 6 hours as needed for pain 20 tablet 0     NADOLOL PO Take 20 mg by mouth daily       omeprazole (PRILOSEC) 20 MG CR capsule Take 20 mg by mouth       Ondansetron HCl (ZOFRAN PO) Take 4 mg by mouth       propranolol (INDERAL) 10 MG tablet Take 10 mg by mouth       traMADol (ULTRAM) 50 MG tablet Take 50 mg by mouth       XIFAXAN 550 MG TABS tablet          ALLERGIES: Review of patient's allergies indicates no known allergies.      REVIEW OF SYSTEMS:  Skin: negative  Eyes: negative  Ears/Nose/Throat: negative  Respiratory: No shortness of breath, dyspnea on exertion, cough, or hemoptysis  Cardiovascular: negative  Gastrointestinal: negative  Genitourinary: as above  Musculoskeletal: negative  Neurologic: negative  Psychiatric: negative  Hematologic/Lymphatic/Immunologic: negative  Endocrine: negative      GENERAL PHYSICAL EXAM:   Vitals: /73  Pulse 51  Ht 1.676 m (5' 5.97\")  Wt 82.6 kg (182 lb)  BMI 29.4 kg/m2  Body mass index is 29.4 kg/(m^2).  Constitutional: healthy, alert and no distress  Head: Normocephalic  Neck: Neck " supple  Cardiovascular: negative  Respiratory: negative  Gastrointestinal: Abdomen soft, non-tender  Musculoskeletal: extremities normal  Skin: no suspicious lesions or rashes  Neurologic: Gait normal  Psychiatric: affect normal/bright and mentation appears normal.       EXAM:   Left Flank: negative  Right Flank: negative  Inguinal Area: normal      RECTAL EXAM:   Size: 1+  Sphincter tone: normal  Tenderness: Absent  Rectal Mass: Absent  Prostate Nodule: Absent    Urinary Flow Rate  Peak Flow: 11.4 mL/s  Average Flow: 6.2 mL/s  Voided (mL): 122 mL  Residual Volume by Ultrasound: 56 mL    RADIOLOGY: The following tests were reviewed: Need to complete the following Radiology exams prior to the office appointment:  LABS: The last test results for Needs to complete the necessary tests prior to appointment: were reviewed.     ASSESSMENT:  64 y/o M with lower urinary tract symptoms preparing for liver transplant     PLAN:   Start trial of alfuzosin   Follow up in six weeks for uroflow/PVR         I spent over 20 minutes with the patient.  Over half this time was spent on counseling for lower urinary tract symptoms. We discussed rx use and BPH symptoms.       Answers for HPI/ROS submitted by the patient on 7/13/2018   General Symptoms: Yes  Skin Symptoms: Yes  HENT Symptoms: Yes  EYE SYMPTOMS: Yes  HEART SYMPTOMS: Yes  LUNG SYMPTOMS: No  INTESTINAL SYMPTOMS: Yes  URINARY SYMPTOMS: Yes  REPRODUCTIVE SYMPTOMS: Yes  SKELETAL SYMPTOMS: No  BLOOD SYMPTOMS: Yes  NERVOUS SYSTEM SYMPTOMS: Yes  MENTAL HEALTH SYMPTOMS: Yes  Fever: No  Loss of appetite: No  Weight loss: No  Weight gain: Yes  Fatigue: No  Night sweats: No  Chills: No  Increased stress: No  Excessive hunger: No  Excessive thirst: No  Feeling hot or cold when others believe the temperature is normal: No  Loss of height: No  Post-operative complications: No  Surgical site pain: Yes  Hallucinations: No  Change in or Loss of Energy: No  Hyperactivity: No  Confusion:  No  Changes in hair: No  Changes in moles/birth marks: No  Itching: No  Rashes: No  Changes in nails: Yes  Acne: No  Change in facial hair: No  Warts: No  Non-healing sores: No  Scarring: No  Flaking of skin: Yes  Color changes of hands/feet in cold : No  Sun sensitivity: No  Skin thickening: No  Ear pain: No  Ear discharge: No  Hearing loss: Yes  Nosebleeds: No  Congestion: Yes  Sinus pain: No  Trouble swallowing: No   Voice hoarseness: Yes  Mouth sores: No  Sore throat: No  Tooth pain: No  Gum tenderness: No  Bleeding gums: No  Change in taste: No  Change in sense of smell: No  Dry mouth: Yes  Hearing aid used: No  Neck lump: No  Eye pain: No  Vision loss: Yes  Dry eyes: Yes  Watery eyes: Yes  Eye bulging: No  Double vision: No  Flashing of lights: No  Spots: No  Floaters: No  Redness: No  Crossed eyes: No  Tunnel Vision: No  Yellowing of eyes: Yes  Eye irritation: No  Chest pain or pressure: No  Fast or irregular heartbeat: No  Pain in legs with walking: No  Trouble breathing while lying down: No  Fingers or toes appear blue: No  High blood pressure: No  Low blood pressure: No  Fainting: No  Murmurs: No  Pacemaker: No  Varicose veins: No  Edema or swelling: Yes  Wake up at night with shortness of breath: No  Light-headedness: No  Exercise intolerance: No  Heart burn or indigestion: Yes  Nausea: Yes  Vomiting: No  Abdominal pain: Yes  Bloating: Yes  Constipation: No  Blood in stool: No  Black stools: No  Rectal or Anal pain: No  Fecal incontinence: No  Yellowing of skin or eyes: Yes  Vomit with blood: No  Change in stools: No  Trouble holding urine or incontinence: No  Pain or burning: No  Trouble starting or stopping: No  Increased frequency of urination: No  Blood in urine: No  Decreased frequency of urination: No  Frequent nighttime urination: Yes  Flank pain: No  Difficulty emptying bladder: No  Anemia: No  Swollen glands: No  Easy bleeding or bruising: No  Trouble with coordination: No  Dizziness or trouble  with balance: No  Fainting or black-out spells: No  Memory loss: No  Headache: No  Seizures: No  Speech problems: No  Tingling: Yes  Tremor: No  Weakness: No  Difficulty walking: No  Paralysis: No  Numbness: Yes  Scrotal pain or swelling: No  Erectile dysfunction: Yes  Penile discharge: No  Genital ulcers: No  Reduced libido: Yes  Nervous or Anxious: No  Depression: No  Trouble sleeping: No  Trouble thinking or concentrating: Yes  Mood changes: No  Panic attacks: No  PHQ-2 Score: 0

## 2018-07-13 NOTE — LETTER
7/13/2018       RE: Loy Limon  [address]  [place] [address]     Dear Colleague,    Thank you for referring your patient, Loy iLmon, to the [facility]. Please see a copy of my visit note below.        Interventional Radiology Clinic Visit    Date of this visit: 7/13/2018    Loy Limon returns for follow up post microwave ablation of residual mass in hepatic segment 8 on 7/2/2018. The same lesion was also chemoembolized on 6/13/2018.      Primary Physician: [facility], Oncology        History Of Present Illness:    Loy Limon is a 63 year old male who is here for follow-up. He has a diagnosis of HCC on a background of Etoh Cirrhosis. He had a mass measuring 3.7 cm in segment 8 which was first chemoembolized on 6/13/2018. On the lien-CT performed during the procedure, there was a portion of the mass which was not stained. The patient was called back for microwave ablation of the residual unstained medial portion of the mass and microwave ablated on 7/2/2018.    The patient feels well today. He had mild abdominal pain after the procedure and he took analgesics for 2 days, now pain free. Also after the ablation his ankle swelling slightly worsened. For ankle swelling, Dr. Carballo prescribed diuretics but patient could not get those because of insurance problems. He denies postprocedural fever, nausea, jaundice or confusion. His LFTs are stable. He has a recent MRI from 2 days ago, which does not show any evidence of residual tumor.        Review of Systems:    General: Negative for recent fever.  Skin: Negative for jaundice.  Eyes: Negative for jaundice.  Respiratory: Negative for shortness of breath.  Cardiovascular: Negative for chest pain.  Gastrointestinal: Negative for abdominal pain or swelling.  Musculoskeletal: Positive for ankle swelling.       Past Medical/Surgical History:    Past Medical History:   Diagnosis Date     Cancer (H)      hepatocellualr carcinoma     Cirrhosis of liver (H) 5/8/2018     Enlarged prostate      Inguinal hernia      Liver lesion 5/8/2018     Past Surgical History:   Procedure Laterality Date     APPENDECTOMY       COLONOSCOPY      2018       Current Medications:    Current Outpatient Prescriptions   Medication Sig Dispense Refill     HYDROmorphone (DILAUDID) 2 MG tablet Take 1 tablet (2 mg) by mouth every 6 hours as needed for pain 20 tablet 0     NADOLOL PO Take 20 mg by mouth daily       omeprazole (PRILOSEC) 20 MG CR capsule Take 20 mg by mouth       Ondansetron HCl (ZOFRAN PO) Take 4 mg by mouth       propranolol (INDERAL) 10 MG tablet Take 10 mg by mouth       traMADol (ULTRAM) 50 MG tablet Take 50 mg by mouth       XIFAXAN 550 MG TABS tablet          Allergies:    Review of patient's allergies indicates no known allergies.    Family History:    No family history on file.    Social History:      Social History     Social History     Marital status:      Spouse name: N/A     Number of children: N/A     Years of education: N/A     Social History Main Topics     Smoking status: Former Smoker     Packs/day: 0.03     Years: 20.00     Types: Cigarettes, Cigars     Smokeless tobacco: Never Used      Comment: Quit smoking Feb 2018, one cigarette after dinner     Alcohol use Yes      Comment: Quit drinking Feb 2018     Drug use: No     Sexual activity: Not on file     Other Topics Concern     Not on file     Social History Narrative       Physical Exam:    There were no vitals taken for this visit.   GENERAL APPEARANCE: healthy, alert and no distress  PSYCH: Mentation and affect normal  EYES: No jaundice.  SKIN: No jaundice.   ABDOMEN:  soft, nontender, no masses and bowel sounds normal.  MUSCULOSKELETAL: Edema in the lower extremities    Laboratory Studies:    Lab Results   Component Value Date    HGB 11.8 07/11/2018     Lab Results   Component Value Date    PLT 81 07/11/2018     Lab Results   Component Value Date     WBC 3.4 07/11/2018       Lab Results   Component Value Date    INR 1.41 07/02/2018       Lab Results   Component Value Date    PROTTOTAL 6.5 07/11/2018      Lab Results   Component Value Date    ALBUMIN 2.4 07/11/2018     Lab Results   Component Value Date    BILITOTAL 2.2 07/11/2018      Lab Results   Component Value Date    ALKPHOS 270 07/11/2018     Lab Results   Component Value Date    AST 60 07/11/2018     Lab Results   Component Value Date    ALT 50 07/11/2018       Lab Results   Component Value Date    CR 0.50 07/11/2018     Lab Results   Component Value Date    BUN 10 07/11/2018       Alpha Fetoprotein   Date Value Ref Range Status   06/18/2018 3.8 0 - 8 ug/L Final     Comment:     Assay Method:  Chemiluminescence using Siemens Centaur XP       Imaging:     I reviewed the MRI from 7/11/2018. It shows resolution of contrast enhancement in the treated lesion. There is no significant change in LIRADS 4 lesion in segment 7 and LIRADS 3 lesions in segment 6 and 8.     ASSESSMENT/PLAN:      Loy Limon is a 63 year old male with HCC who is status post microwave ablation of residual lesion segment 8 and made an uneventful recovery. Follow up imaging shows resolution of contrast enhancement with no evidence of residual tumor. We discussed that he has other lesions in the liver which we need to keep eye on. We showed him the imaging and discussed the findings.    We planned to get another MRI in three months and clinic visit and he agreed with the plan.      A total of 25 minutes was spent in care for the patient.     It was a pleasure seeing the patient.     Signed,    Niru Cochran MD, Debora Del Rosario M.D.  Department of Interventional Radiology  [facility]      I, Dr Debora Del Rosario, was present with the fellow during the history and exam. I discussed the case with the fellow and agree with the findings as documented in the assessment and plan.        CC  Patient Care Team:  [facility], On[facility], MD as PCP - General (Radiology)  Dario Anguiano MD as MD (Vascular and Interventional Radiology)  David Champagne MD (Gastroenterology)  Mathew Self MD as MD (Urology)  Sandrita Cano, RN as Registered Nurse (Urology)

## 2018-07-13 NOTE — LETTER
7/13/2018       RE: Loy Limon  [address]  [place] [address]     Dear Colleague,    Thank you for referring your patient, Loy Limon, to the [facility] AND [facility] FOR PROSTATE AND UROLOGIC CANCERS at [facility]. Please see a copy of my visit note below.    It was my pleasure to see Loy Limon a 63 year old year old male today. Patient was seen in consultation for lower urinary tract symptoms.    HPI:     Patient presents for evaluation and management of BPH. Patient found to have enlarged prostate on  work up previously   Patient preparing for liver transplant and presents for clearance.     Nocturia 3x per night   Frequency up to 1.5-2 hours   Good stream of urine       Past Medical History:   Diagnosis Date     Cancer (H)     hepatocellualr carcinoma     Cirrhosis of liver (H) 5/8/2018     Enlarged prostate      Inguinal hernia      Liver lesion 5/8/2018       Past Surgical History:   Procedure Laterality Date     APPENDECTOMY       COLONOSCOPY      2018       No family history on file.    Current Outpatient Prescriptions   Medication Sig Dispense Refill     HYDROmorphone (DILAUDID) 2 MG tablet Take 1 tablet (2 mg) by mouth every 6 hours as needed for pain 20 tablet 0     NADOLOL PO Take 20 mg by mouth daily       omeprazole (PRILOSEC) 20 MG CR capsule Take 20 mg by mouth       Ondansetron HCl (ZOFRAN PO) Take 4 mg by mouth       propranolol (INDERAL) 10 MG tablet Take 10 mg by mouth       traMADol (ULTRAM) 50 MG tablet Take 50 mg by mouth       XIFAXAN 550 MG TABS tablet          ALLERGIES: Review of patient's allergies indicates no known allergies.      REVIEW OF SYSTEMS:  Skin: negative  Eyes: negative  Ears/Nose/Throat: negative  Respiratory: No shortness of breath, dyspnea on exertion, cough, or hemoptysis  Cardiovascular: negative  Gastrointestinal: negative  Genitourinary: as above  Musculoskeletal: negative  Neurologic:  "negative  Psychiatric: negative  Hematologic/Lymphatic/Immunologic: negative  Endocrine: negative      GENERAL PHYSICAL EXAM:   Vitals: /73  Pulse 51  Ht 1.676 m (5' 5.97\")  Wt 82.6 kg (182 lb)  BMI 29.4 kg/m2  Body mass index is 29.4 kg/(m^2).  Constitutional: healthy, alert and no distress  Head: Normocephalic  Neck: Neck supple  Cardiovascular: negative  Respiratory: negative  Gastrointestinal: Abdomen soft, non-tender  Musculoskeletal: extremities normal  Skin: no suspicious lesions or rashes  Neurologic: Gait normal  Psychiatric: affect normal/bright and mentation appears normal.       EXAM:   Left Flank: negative  Right Flank: negative  Inguinal Area: normal      RECTAL EXAM:   Size: 1+  Sphincter tone: normal  Tenderness: Absent  Rectal Mass: Absent  Prostate Nodule: Absent    Urinary Flow Rate  Peak Flow: 11.4 mL/s  Average Flow: 6.2 mL/s  Voided (mL): 122 mL  Residual Volume by Ultrasound: 56 mL    RADIOLOGY: The following tests were reviewed: Need to complete the following Radiology exams prior to the office appointment:  LABS: The last test results for Needs to complete the necessary tests prior to appointment: were reviewed.     ASSESSMENT:  64 y/o M with lower urinary tract symptoms preparing for liver transplant     PLAN:   Start trial of alfuzosin   Follow up in six weeks for uroflow/PVR         I spent over 20 minutes with the patient.  Over half this time was spent on counseling for lower urinary tract symptoms. We discussed rx use and BPH symptoms.             Again, thank you for allowing me to participate in the care of your patient.      Sincerely,    Khloe Torres MD      "

## 2018-07-13 NOTE — MR AVS SNAPSHOT
After Visit Summary   7/13/2018    Loy Limon    MRN: [id number]           Patient Information     Date Of Birth          1955        Visit Information        Provider Department      7/13/2018 11:15 AM Blanca Jacinto Maria Alexandra, MD [facility]        Today's Diagnoses     Nocturia    -  1      Care Instructions    Take medication as directed.    Return in 6 weeks with a full bladder for a urine flow test.    It was a pleasure meeting with you today.  Thank you for allowing me and my team the privilege of caring for you today.  YOU are the reason we are here, and I truly hope we provided you with the excellent service you deserve.  Please let us know if there is anything else we can do for you so that we can be sure you are leaving completely satisfied with your care experience.                  Follow-ups after your visit        Your next 10 appointments already scheduled     Jul 25, 2018  8:00 AM CDT   CTA ANGIOGRAM CORONARY ARTERY with UUCT4, UU CV CT NURSE   [facility], [place], [place] ([facility], [facility])    [address] [address] [phone]           Please allow two hours for this test.  Follow the instructions below:   The day before your exam, drink extra fluids at least six 8-ounce glasses (unless your doctor wants you to restrict your fluids).   No caffeine and no smoking the day of the test.   Do not eat or drink for 2 hours before your exam. You may take your morning medicines with small sips of water.   If you take Viagra, Levitra or Cialis, stop taking it for 48 hours before your test.  For patients with diabetes:   You may need a blood test (creatinine test) within 30 days of your exam; the Cardiologist/Radiologist may require you to get this test prior to your exam   If you are diabetic and take oral hypoglycemics, do not take them on the day of your test.   Bring any scans or X-rays taken at other hospitals, if similar tests were done. Also bring a list of your medicines, including vitamins, minerals and over-the-counter drugs. It is safest to leave personal items at home.  Be sure to tell your doctor:   If you have any allergies, including any reaction to contrast.   If you are breastfeeding or there is a chance you are pregnant.  Please wear loose clothing, such as a sweat suit or jogging clothes. Avoid snaps, zippers and other metal. We may ask you to undress and put on a hospital gown.  If you have any questions, please call the Imaging Department where you will have your exam.            Aug 14, 2018  8:30 AM CDT   (Arrive by 8:15 AM)   Return General Liver with Tamela Carballo MD   [facility] ([facility])    [address]  [address]  [facility] [address]   [phone]            Aug 14, 2018  6:00 PM CDT   (Arrive by 5:45 PM)   New Patient Visit with Lesly Zazueta MD   [facility] ([facility])    [address]  [address]  [facility] [address]   [phone]            Oct 03, 2018  8:30 AM CDT   MR ABDOMEN W/O & W CONTRAST with [address] MRI ([facility])    [address]  1st Floor  [facility] [address]   [phone]           Take your medicines as usual, unless your doctor tells you not to. Bring a list of your current medicines to your exam (including vitamins, minerals and over-the-counter drugs). Also bring the results of similar scans you may have had.    You may or may not receive IV contrast for this exam pending the discretion of the Radiologist.   Do not eat or drink for 6 hours prior to exam.  The MRI machine uses a strong magnet. Please wear clothes without metal (snaps, zippers). A sweatsuit works well, or we may give you a hospital gown.  Please remove any  body piercings and hair extensions before you arrive. You will also remove watches, jewelry, hairpins, wallets, dentures, partial dental plates and hearing aids. You may wear contact lenses, and you may be able to wear your rings. We have a safe place to keep your personal items, but it is safer to leave them at home.  **IMPORTANT** THE INSTRUCTIONS BELOW ARE ONLY FOR THOSE PATIENTS WHO HAVE BEEN PRESCRIBED SEDATION OR GENERAL ANESTHESIA DURING THEIR MRI PROCEDURE:  IF YOUR DOCTOR PRESCRIBED ORAL SEDATION (take medicine to help you relax during your exam):   You must get the medicine from your doctor (oral medication) before you arrive. Bring the medicine to the exam. Do not take it at home. You ll be told when to take it upon arriving for your exam.   Arrive one hour early. Bring someone who can take you home after the test. Your medicine will make you sleepy. After the exam, you may not drive, take a bus or take a taxi by yourself.  IF YOUR DOCTOR PRESCRIBED IV SEDATION:   Arrive one hour early. Bring someone who can take you home after the test. Your medicine will make you sleepy. After the exam, you may not drive, take a bus or take a taxi by yourself.   No eating 6 hours before your exam. You may have clear liquids up until 4 hours before your exam. (Clear liquids include water, clear tea, black coffee and fruit juice without pulp.)  IF YOUR DOCTOR PRESCRIBED ANESTHESIA (be asleep for your exam):   Arrive 1 1/2 hours early. Bring someone who can take you home after the test. You may not drive, take a bus or take a taxi by yourself.   No eating 8 hours before your exam. You may have clear liquids up until 4 hours before your exam. (Clear liquids include water, clear tea, black coffee and fruit juice without pulp.)   You will spend four to five hours in the recovery room.  If you have any questions, please contact your Imaging Department exam site.            Oct 03, 2018  9:20 AM CDT   CT CHEST W/O CONTRAST with  CT[address] [place] [address] [place] ([facility])    [address] [address] [phone]           Please bring any scans or X-rays taken at other hospitals, if similar tests were done. Also bring a list of your medicines, including vitamins, minerals and over-the-counter drugs. It is safest to leave personal items at home.  Be sure to tell your doctor:   If you have any allergies.   If there s any chance you are pregnant.   If you are breastfeeding.  You do not need to do anything special to prepare for this exam.  Please wear loose clothing, such as a sweat suit or jogging clothes. Avoid snaps, zippers and other metal. We may ask you to undress and put on a hospital gown.            Oct 03, 2018  9:45 AM CDT   Lab with  LAB   [facility] ([facility])    [address] [place] [address]   [phone]            Oct 04, 2018  4:00 PM CDT   (Arrive by 3:45 PM)   Return Visit with Hi Melgar MD   [facility] ([facility])    [address] [place] [address]  [address]  [facility] [address]   [phone]            Oct 10, 2018  8:30 AM CDT   MR ABDOMEN W/O & W CONTRAST with [address] MRI ([facility])    [address] [place] [address] [phone]           Take your medicines as usual, unless your doctor tells you not to. Bring a list of your current medicines to your exam (including vitamins, minerals and over-the-counter drugs). Also bring the results of similar scans you may have had.    You may or may not receive IV contrast for this exam pending the discretion of the Radiologist.   Do not eat or drink for 6 hours prior to exam.  The MRI machine uses a strong magnet. Please wear clothes without metal (snaps, zippers). A sweatsuit works well, or we may give you a hospital gown.  Please  remove any body piercings and hair extensions before you arrive. You will also remove watches, jewelry, hairpins, wallets, dentures, partial dental plates and hearing aids. You may wear contact lenses, and you may be able to wear your rings. We have a safe place to keep your personal items, but it is safer to leave them at home.  **IMPORTANT** THE INSTRUCTIONS BELOW ARE ONLY FOR THOSE PATIENTS WHO HAVE BEEN PRESCRIBED SEDATION OR GENERAL ANESTHESIA DURING THEIR MRI PROCEDURE:  IF YOUR DOCTOR PRESCRIBED ORAL SEDATION (take medicine to help you relax during your exam):   You must get the medicine from your doctor (oral medication) before you arrive. Bring the medicine to the exam. Do not take it at home. You ll be told when to take it upon arriving for your exam.   Arrive one hour early. Bring someone who can take you home after the test. Your medicine will make you sleepy. After the exam, you may not drive, take a bus or take a taxi by yourself.  IF YOUR DOCTOR PRESCRIBED IV SEDATION:   Arrive one hour early. Bring someone who can take you home after the test. Your medicine will make you sleepy. After the exam, you may not drive, take a bus or take a taxi by yourself.   No eating 6 hours before your exam. You may have clear liquids up until 4 hours before your exam. (Clear liquids include water, clear tea, black coffee and fruit juice without pulp.)  IF YOUR DOCTOR PRESCRIBED ANESTHESIA (be asleep for your exam):   Arrive 1 1/2 hours early. Bring someone who can take you home after the test. You may not drive, take a bus or take a taxi by yourself.   No eating 8 hours before your exam. You may have clear liquids up until 4 hours before your exam. (Clear liquids include water, clear tea, black coffee and fruit juice without pulp.)   You will spend four to five hours in the recovery room.  If you have any questions, please contact your Imaging Department exam site.            Oct 10, 2018  9:15 AM RAMESH   LAB with UC LAB  "  [facility] ([facility])    [address]  1st Floor  [facility] [address] [phone]           Please do not eat 10-12 hours before your appointment if you are coming in fasting for labs on lipids, cholesterol, or glucose (sugar). This does not apply to pregnant women. Water, hot tea and black coffee (with nothing added) are okay. Do not drink other fluids, diet soda or chew gum.              Future tests that were ordered for you today     Open Future Orders        Priority Expected Expires Ordered    MR Abdomen w/o & w Contrast Routine 7/13/2018 7/13/2019 7/13/2018    CBC with platelets Routine  7/13/2019 7/13/2018    Basic metabolic panel Routine  7/13/2019 7/13/2018    INR Routine  7/13/2019 7/13/2018    Hepatic panel Routine  9/15/2018 7/13/2018    AFP tumor marker Routine  7/14/2019 7/13/2018            Who to contact     Please call your clinic at [phone] to:    Ask questions about your health    Make or cancel appointments    Discuss your medicines    Learn about your test results    Speak to your doctor            Additional Information About Your Visit        Care EveryWhere ID     This is your Care EveryWhere ID. This could be used by other organizations to access your [facility] medical records  [id number]        Your Vitals Were     Pulse Height BMI (Body Mass Index)             51 1.676 m (5' 5.97\") 29.4 kg/m2          Blood Pressure from Last 3 Encounters:   07/13/18 142/73   07/13/18 142/68   07/11/18 143/69    Weight from Last 3 Encounters:   07/13/18 82.6 kg (182 lb)   07/13/18 82.8 kg (182 lb 9.6 oz)   07/11/18 80.2 kg (176 lb 14.4 oz)              We Performed the Following     COMPLEX UROFLOWMETRY     POST-VOID RESIDUAL BLADDER SCAN          Today's Medication Changes          These changes are accurate as of 7/13/18 12:34 PM.  If you have any questions, ask your nurse or doctor.               Start taking these medicines.        Dose/Directions "    alfuzosin 10 MG 24 hr tablet   Commonly known as:  UROXATRAL   Used for:  Nocturia   Started by:  Khloe Torres MD        Dose:  10 mg   Take 1 tablet (10 mg) by mouth daily   Quantity:  30 tablet   Refills:  3            Where to get your medicines      [facility] [place] [facility] [place] [place] [address]  [address], [facility] [address]     Phone:  [phone]     alfuzosin 10 MG 24 hr tablet                Primary Care Provider    [facility] MD       [address] [facility] [address]  [facility] [address]        Equal Access to Services     [facility]: Hadii aad ku hadasho Soomaali, waaxda luqadaha, qaybta kaalmada adeegyada, javier rico . So [facility] [phone].    ATENCIÓN: Si habla español, tiene a samaniego disposición servicios gratuitos de asistencia lingüística. Llame al [phone].    We comply with applicable federal civil rights laws and [place] laws. We do not discriminate on the basis of race, color, national origin, age, disability, sex, sexual orientation, or gender identity.            Thank you!     Thank you for choosing [facility] AND [facility]  for your care. Our goal is always to provide you with excellent care. Hearing back from our patients is one way we can continue to improve our services. Please take a few minutes to complete the written survey that you may receive in the mail after your visit with us. Thank you!             Your Updated Medication List - Protect others around you: Learn how to safely use, store and throw away your medicines at www.disposemymeds.org.          This list is accurate as of 7/13/18 12:34 PM.  Always use your most recent med list.                   Brand Name Dispense Instructions for use Diagnosis    alfuzosin 10 MG 24 hr tablet    UROXATRAL    30 tablet    Take 1 tablet (10 mg) by mouth daily    Nocturia        HYDROmorphone 2 MG tablet    DILAUDID    20 tablet    Take 1 tablet (2 mg) by mouth every 6 hours as needed for pain    HCC (hepatocellular carcinoma) (H)       NADOLOL PO      Take 20 mg by mouth daily        omeprazole 20 MG CR capsule    priLOSEC     Take 20 mg by mouth        propranolol 10 MG tablet    INDERAL     Take 10 mg by mouth    Cirrhosis (H), Pulmonary nodules       traMADol 50 MG tablet    ULTRAM     Take 50 mg by mouth        XIFAXAN 550 MG Tabs tablet   Generic drug:  rifaximin           ZOFRAN PO      Take 4 mg by mouth

## 2018-07-13 NOTE — MR AVS SNAPSHOT
After Visit Summary   7/13/2018    Loy Limon    MRN: [id number]           Patient Information     Date Of Birth          1955        Visit Information        Provider Department      7/13/2018 8:45 AM Barbra Jacinto; Debora Ac MD [facility]        Today's Diagnoses     HCC (hepatocellular carcinoma) (H)    -  1       Follow-ups after your visit        Your next 10 appointments already scheduled     Jul 25, 2018  8:00 AM CDT   CTA ANGIOGRAM CORONARY ARTERY with UUCT4, UU CV CT NURSE   [facility], [place], [place] ([facility], [facility])    [address] [address] [phone]           Please allow two hours for this test.  Follow the instructions below:   The day before your exam, drink extra fluids at least six 8-ounce glasses (unless your doctor wants you to restrict your fluids).   No caffeine and no smoking the day of the test.   Do not eat or drink for 2 hours before your exam. You may take your morning medicines with small sips of water.   If you take Viagra, Levitra or Cialis, stop taking it for 48 hours before your test.  For patients with diabetes:   You may need a blood test (creatinine test) within 30 days of your exam; the Cardiologist/Radiologist may require you to get this test prior to your exam   If you are diabetic and take oral hypoglycemics, do not take them on the day of your test.  Bring any scans or X-rays taken at other hospitals, if similar tests were done. Also bring a list of your medicines, including vitamins, minerals and over-the-counter drugs. It is safest to leave personal items at home.  Be sure to tell your doctor:   If you have any allergies, including any reaction to contrast.   If you are breastfeeding or there is a chance you are pregnant.  Please wear loose clothing, such as a sweat suit or jogging clothes. Avoid snaps, zippers and other metal. We may ask you to undress and put on  a hospital gown.  If you have any questions, please call the Imaging Department where you will have your exam.            Aug 14, 2018  8:30 AM CDT   (Arrive by 8:15 AM)   Return General Liver with Tamela Carballo MD   [facility] ([facility])    [address]  [address]  [place] [address]   [phone]            Aug 14, 2018  6:00 PM CDT   (Arrive by 5:45 PM)   New Patient Visit with Lesly Zazueta MD   [facility] ([facility])    [address]  [address]  [place] [address]   [phone]            Aug 24, 2018 10:45 AM CDT   (Arrive by 10:30 AM)   Return Visit with Khloe Torres MD   [facility] ([facility])    [address]  4th Floor  [place] [address] [phone]            Oct 03, 2018  8:30 AM CDT   MR ABDOMEN W/O & W CONTRAST with UC1   [facility] ([facility])    [address]  1st Floor  [place] [address] [phone]           Take your medicines as usual, unless your doctor tells you not to. Bring a list of your current medicines to your exam (including vitamins, minerals and over-the-counter drugs). Also bring the results of similar scans you may have had.    You may or may not receive IV contrast for this exam pending the discretion of the Radiologist.   Do not eat or drink for 6 hours prior to exam.  The MRI machine uses a strong magnet. Please wear clothes without metal (snaps, zippers). A sweatsuit works well, or we may give you a hospital gown.  Please remove any body piercings and hair extensions before you arrive. You will also remove watches, jewelry, hairpins, wallets, dentures, partial dental plates and hearing aids. You may wear contact lenses, and you may be able to wear your rings. We have a  safe place to keep your personal items, but it is safer to leave them at home.  **IMPORTANT** THE INSTRUCTIONS BELOW ARE ONLY FOR THOSE PATIENTS WHO HAVE BEEN PRESCRIBED SEDATION OR GENERAL ANESTHESIA DURING THEIR MRI PROCEDURE:  IF YOUR DOCTOR PRESCRIBED ORAL SEDATION (take medicine to help you relax during your exam):   You must get the medicine from your doctor (oral medication) before you arrive. Bring the medicine to the exam. Do not take it at home. You ll be told when to take it upon arriving for your exam.   Arrive one hour early. Bring someone who can take you home after the test. Your medicine will make you sleepy. After the exam, you may not drive, take a bus or take a taxi by yourself.  IF YOUR DOCTOR PRESCRIBED IV SEDATION:   Arrive one hour early. Bring someone who can take you home after the test. Your medicine will make you sleepy. After the exam, you may not drive, take a bus or take a taxi by yourself.   No eating 6 hours before your exam. You may have clear liquids up until 4 hours before your exam. (Clear liquids include water, clear tea, black coffee and fruit juice without pulp.)  IF YOUR DOCTOR PRESCRIBED ANESTHESIA (be asleep for your exam):   Arrive 1 1/2 hours early. Bring someone who can take you home after the test. You may not drive, take a bus or take a taxi by yourself.   No eating 8 hours before your exam. You may have clear liquids up until 4 hours before your exam. (Clear liquids include water, clear tea, black coffee and fruit juice without pulp.)   You will spend four to five hours in the recovery room.  If you have any questions, please contact your Imaging Department exam site.            Oct 03, 2018  9:20 AM CDT   CT CHEST W/O CONTRAST with UCCT1   [facility] ([facility])    [address]  1st Floor  [facility] [address] [phone]           Please bring any scans or X-rays taken at other hospitals, if similar tests were  done. Also bring a list of your medicines, including vitamins, minerals and over-the-counter drugs. It is safest to leave personal items at home.  Be sure to tell your doctor:   If you have any allergies.   If there s any chance you are pregnant.   If you are breastfeeding.  You do not need to do anything special to prepare for this exam.  Please wear loose clothing, such as a sweat suit or jogging clothes. Avoid snaps, zippers and other metal. We may ask you to undress and put on a hospital gown.            Oct 03, 2018  9:45 AM CDT   Lab with  LAB   [facility] ([facility])    [address]  1st Floor  [facility] [address] [phone]            Oct 04, 2018  4:00 PM CDT   (Arrive by 3:45 PM)   Return Visit with Hi Melgar MD   [facility] ([facility])    [address]  [address] [place] [id number] [phone]            Oct 10, 2018  8:30 AM CDT   MR ABDOMEN W/O & W CONTRAST with [address] MRI ([facility])    [address]  1st Floor  [facility] [id number] [phone]           Take your medicines as usual, unless your doctor tells you not to. Bring a list of your current medicines to your exam (including vitamins, minerals and over-the-counter drugs). Also bring the results of similar scans you may have had.    You may or may not receive IV contrast for this exam pending the discretion of the Radiologist.   Do not eat or drink for 6 hours prior to exam.  The MRI machine uses a strong magnet. Please wear clothes without metal (snaps, zippers). A sweatsuit works well, or we may give you a hospital gown.  Please remove any body piercings and hair extensions before you arrive. You will also remove watches, jewelry, hairpins, wallets, dentures, partial dental plates and hearing aids. You may wear contact lenses, and you may be able to wear your rings.  We have a safe place to keep your personal items, but it is safer to leave them at home.  **IMPORTANT** THE INSTRUCTIONS BELOW ARE ONLY FOR THOSE PATIENTS WHO HAVE BEEN PRESCRIBED SEDATION OR GENERAL ANESTHESIA DURING THEIR MRI PROCEDURE:  IF YOUR DOCTOR PRESCRIBED ORAL SEDATION (take medicine to help you relax during your exam):   You must get the medicine from your doctor (oral medication) before you arrive. Bring the medicine to the exam. Do not take it at home. You ll be told when to take it upon arriving for your exam.   Arrive one hour early. Bring someone who can take you home after the test. Your medicine will make you sleepy. After the exam, you may not drive, take a bus or take a taxi by yourself.  IF YOUR DOCTOR PRESCRIBED IV SEDATION:   Arrive one hour early. Bring someone who can take you home after the test. Your medicine will make you sleepy. After the exam, you may not drive, take a bus or take a taxi by yourself.   No eating 6 hours before your exam. You may have clear liquids up until 4 hours before your exam. (Clear liquids include water, clear tea, black coffee and fruit juice without pulp.)  IF YOUR DOCTOR PRESCRIBED ANESTHESIA (be asleep for your exam):   Arrive 1 1/2 hours early. Bring someone who can take you home after the test. You may not drive, take a bus or take a taxi by yourself.   No eating 8 hours before your exam. You may have clear liquids up until 4 hours before your exam. (Clear liquids include water, clear tea, black coffee and fruit juice without pulp.)   You will spend four to five hours in the recovery room.  If you have any questions, please contact your Imaging Department exam site.              Who to contact     If you have questions or need follow up information about today's clinic visit or your schedule please contact [facility] directly at [phone].  Normal or non-critical lab and imaging results will be communicated to you by Rayne, letter  "or phone within 4 business days after the clinic has received the results. If you do not hear from us within 7 days, please contact the clinic through [employer]â„¢t or phone. If you have a critical or abnormal lab result, we will notify you by phone as soon as possible.  Submit refill requests through [employer] or call your pharmacy and they will forward the refill request to us. Please allow 3 business days for your refill to be completed.          Additional Information About Your Visit        Care EveryWhere ID     This is your Care EveryWhere ID. This could be used by other organizations to access your [place] medical records  [id number]        Your Vitals Were     Pulse Temperature Respirations Height Pulse Oximetry BMI (Body Mass Index)    55 97.3  F (36.3  C) (Oral) 14 1.676 m (5' 5.98\") 99% 29.49 kg/m2       Blood Pressure from Last 3 Encounters:   07/13/18 142/73   07/13/18 142/68   07/11/18 143/69    Weight from Last 3 Encounters:   07/13/18 82.6 kg (182 lb)   07/13/18 82.8 kg (182 lb 9.6 oz)   07/11/18 80.2 kg (176 lb 14.4 oz)                 Today's Medication Changes          These changes are accurate as of 7/13/18 11:59 PM.  If you have any questions, ask your nurse or doctor.               Start taking these medicines.        Dose/Directions    alfuzosin 10 MG 24 hr tablet   Commonly known as:  UROXATRAL   Used for:  Nocturia   Started by:  Khloe Torres MD        Dose:  10 mg   Take 1 tablet (10 mg) by mouth daily   Quantity:  30 tablet   Refills:  3            Where to get your medicines      [place] [address]  [address], [facility] [address]     Phone:  [phone]     alfuzosin 10 MG 24 hr tablet                Primary Care Provider    [facility]       [address] [address]        Equal Access to Services     DAVID COWAN AH: Wyatt giraldo " Zain, sunda luqadaha, qaybta kaalmada sergio, javier maciel greylida laBrendaannia woody. So [facility] [phone].    ATENCIÓN: Si charlotte hoover, tiene a samaniego disposición servicios gratuitos de asistencia lingüística. Earle al [phone].    We comply with applicable federal civil rights laws and [place] laws. We do not discriminate on the basis of race, color, national origin, age, disability, sex, sexual orientation, or gender identity.            Thank you!     Thank you for choosing [facility]  for your care. Our goal is always to provide you with excellent care. Hearing back from our patients is one way we can continue to improve our services. Please take a few minutes to complete the written survey that you may receive in the mail after your visit with us. Thank you!             Your Updated Medication List - Protect others around you: Learn how to safely use, store and throw away your medicines at www.disposemymeds.org.          This list is accurate as of 7/13/18 11:59 PM.  Always use your most recent med list.                   Brand Name Dispense Instructions for use Diagnosis    alfuzosin 10 MG 24 hr tablet    UROXATRAL    30 tablet    Take 1 tablet (10 mg) by mouth daily    Nocturia       HYDROmorphone 2 MG tablet    DILAUDID    20 tablet    Take 1 tablet (2 mg) by mouth every 6 hours as needed for pain    HCC (hepatocellular carcinoma) (H)       NADOLOL PO      Take 20 mg by mouth daily        omeprazole 20 MG CR capsule    priLOSEC     Take 20 mg by mouth        propranolol 10 MG tablet    INDERAL     Take 10 mg by mouth    Cirrhosis (H), Pulmonary nodules       traMADol 50 MG tablet    ULTRAM     Take 50 mg by mouth        XIFAXAN 550 MG Tabs tablet   Generic drug:  rifaximin           ZOFRAN PO      Take 4 mg by mouth

## 2018-07-16 ENCOUNTER — TELEPHONE (OUTPATIENT)
Dept: TRANSPLANT | Facility: CLINIC | Age: 63
End: 2018-07-16

## 2018-07-16 DIAGNOSIS — K74.60 CIRRHOSIS OF LIVER (H): Primary | ICD-10-CM

## 2018-07-16 NOTE — TELEPHONE ENCOUNTER
Per note from IR nurse pt reporting LE edema and requesting 'water pill' from Dr. Carballo.     Attempted to reach pt to check in and review symptoms. Unable to reach pt, will attempt to reach again later today or tomorrow.

## 2018-07-18 LAB
DLCOCOR-%PRED-PRE: 94 %
DLCOCOR-PRE: 24.52 ML/MIN/MMHG
DLCOUNC-%PRED-PRE: 86 %
DLCOUNC-PRE: 22.51 ML/MIN/MMHG
DLCOUNC-PRED: 26.02 ML/MIN/MMHG
ERV-%PRED-PRE: 93 %
ERV-PRE: 0.85 L
ERV-PRED: 0.91 L
EXPTIME-PRE: 7.62 SEC
FEF2575-%PRED-PRE: 92 %
FEF2575-PRE: 2.19 L/SEC
FEF2575-PRED: 2.38 L/SEC
FEFMAX-%PRED-PRE: 77 %
FEFMAX-PRE: 6.31 L/SEC
FEFMAX-PRED: 8.16 L/SEC
FEV1-%PRED-PRE: 99 %
FEV1-PRE: 2.84 L
FEV1FEV6-PRE: 75 %
FEV1FEV6-PRED: 78 %
FEV1FVC-PRE: 75 %
FEV1FVC-PRED: 75 %
FEV1SVC-PRE: 74 %
FEV1SVC-PRED: 66 %
FIFMAX-PRE: 5.98 L/SEC
FRCPLETH-%PRED-PRE: 98 %
FRCPLETH-PRE: 3.42 L
FRCPLETH-PRED: 3.48 L
FVC-%PRED-PRE: 103 %
FVC-PRE: 3.81 L
FVC-PRED: 3.68 L
IC-%PRED-PRE: 87 %
IC-PRE: 3 L
IC-PRED: 3.44 L
MVV-%PRED-PRE: 87 %
MVV-PRE: 109 L/MIN
MVV-PRED: 124 L/MIN
RVPLETH-%PRED-PRE: 105 %
RVPLETH-PRE: 2.57 L
RVPLETH-PRED: 2.43 L
TLCPLETH-%PRED-PRE: 98 %
TLCPLETH-PRE: 6.42 L
TLCPLETH-PRED: 6.52 L
VA-%PRED-PRE: 91 %
VA-PRE: 5.72 L
VC-%PRED-PRE: 88 %
VC-PRE: 3.85 L
VC-PRED: 4.35 L

## 2018-07-18 RX ORDER — SPIRONOLACTONE 25 MG/1
25 TABLET ORAL DAILY
Qty: 30 TABLET | Refills: 0 | Status: SHIPPED | OUTPATIENT
Start: 2018-07-18 | End: 2018-08-24

## 2018-07-18 RX ORDER — FUROSEMIDE 20 MG
10 TABLET ORAL DAILY
Qty: 15 TABLET | Refills: 0 | Status: SHIPPED | OUTPATIENT
Start: 2018-07-18 | End: 2018-08-10

## 2018-07-18 NOTE — TELEPHONE ENCOUNTER
Pt noted to be on both propranolol and nadolol. Call made to pt and medications reviewed. Pt reported he is not taking propranolol but he is taking nadolol. Pt taking other medications as prescribed. Dr. Carballo updated and propranolol removed from pt's medication list.   Communication with pt done via [facility] .

## 2018-07-18 NOTE — TELEPHONE ENCOUNTER
Contact made with pt who reported equal, bilateral, LE swelling that has been increasing over the past couple of months. Pt also reports feeling his weight is increasing although he doesn't have a scale to confirm. Pt denies SOB, dizziness or lightheadedness. Pt denies pain. He has been able to work but the swelling in his legs is becoming more and more bothersome.     Above reviewed with Dr. Carballo who recommended pt start lasix 10mg po once daily and spironolactone 25mg po once daily, and have BMP lab in one week. Orders placed per Dr. Carballo.     Pt called and updated of above. Pt verbalized understanding and denied questions or concerns. Pt reported he will have BMP drawn next week at [facility].     Communication done via [facility] .

## 2018-07-19 ENCOUNTER — TELEPHONE (OUTPATIENT)
Dept: TRANSPLANT | Facility: CLINIC | Age: 63
End: 2018-07-19

## 2018-07-19 DIAGNOSIS — C22.0 HEPATOCELLULAR CARCINOMA (H): ICD-10-CM

## 2018-07-19 DIAGNOSIS — K74.60 CIRRHOSIS (H): ICD-10-CM

## 2018-07-19 DIAGNOSIS — Z79.899 OTHER LONG TERM (CURRENT) DRUG THERAPY: ICD-10-CM

## 2018-07-19 DIAGNOSIS — C22.0 HCC (HEPATOCELLULAR CARCINOMA) (H): ICD-10-CM

## 2018-07-19 DIAGNOSIS — K74.60 CIRRHOSIS OF LIVER (H): ICD-10-CM

## 2018-07-19 DIAGNOSIS — Z12.5 ENCOUNTER FOR SCREENING FOR MALIGNANT NEOPLASM OF PROSTATE: ICD-10-CM

## 2018-07-19 LAB
ABO + RH BLD: NORMAL
ABO + RH BLD: NORMAL
AFP SERPL-MCNC: 4.9 UG/L (ref 0–8)
ALBUMIN SERPL-MCNC: 2.5 G/DL (ref 3.4–5)
ALBUMIN UR-MCNC: NEGATIVE MG/DL
ALP SERPL-CCNC: 344 U/L (ref 40–150)
ALT SERPL W P-5'-P-CCNC: 52 U/L (ref 0–70)
ANION GAP SERPL CALCULATED.3IONS-SCNC: 7 MMOL/L (ref 3–14)
APPEARANCE UR: CLEAR
AST SERPL W P-5'-P-CCNC: 65 U/L (ref 0–45)
BILIRUB DIRECT SERPL-MCNC: 1.1 MG/DL (ref 0–0.2)
BILIRUB SERPL-MCNC: 2.3 MG/DL (ref 0.2–1.3)
BILIRUB UR QL STRIP: NEGATIVE
BLD GP AB SCN SERPL QL: NORMAL
BLOOD BANK CMNT PATIENT-IMP: NORMAL
BUN SERPL-MCNC: 10 MG/DL (ref 7–30)
CALCIUM SERPL-MCNC: 8.3 MG/DL (ref 8.5–10.1)
CHLORIDE SERPL-SCNC: 108 MMOL/L (ref 94–109)
CO2 SERPL-SCNC: 25 MMOL/L (ref 20–32)
COLOR UR AUTO: YELLOW
CREAT SERPL-MCNC: 0.58 MG/DL (ref 0.66–1.25)
CREAT UR-MCNC: 36 MG/DL
ERYTHROCYTE [DISTWIDTH] IN BLOOD BY AUTOMATED COUNT: 14.5 % (ref 10–15)
GFR SERPL CREATININE-BSD FRML MDRD: >90 ML/MIN/1.7M2
GLUCOSE SERPL-MCNC: 130 MG/DL (ref 70–99)
GLUCOSE UR STRIP-MCNC: NEGATIVE MG/DL
HCT VFR BLD AUTO: 33.6 % (ref 40–53)
HGB BLD-MCNC: 11.6 G/DL (ref 13.3–17.7)
HGB UR QL STRIP: NEGATIVE
INR PPP: 1.56 (ref 0.86–1.14)
IRON SATN MFR SERPL: 68 % (ref 15–46)
IRON SERPL-MCNC: 141 UG/DL (ref 35–180)
KETONES UR STRIP-MCNC: NEGATIVE MG/DL
LEUKOCYTE ESTERASE UR QL STRIP: NEGATIVE
MCH RBC QN AUTO: 34 PG (ref 26.5–33)
MCHC RBC AUTO-ENTMCNC: 34.5 G/DL (ref 31.5–36.5)
MCV RBC AUTO: 99 FL (ref 78–100)
NITRATE UR QL: NEGATIVE
PH UR STRIP: 8 PH (ref 5–7)
PLATELET # BLD AUTO: 72 10E9/L (ref 150–450)
POTASSIUM SERPL-SCNC: 4.2 MMOL/L (ref 3.4–5.3)
PROT SERPL-MCNC: 6.9 G/DL (ref 6.8–8.8)
PROT UR-MCNC: <0.05 G/L
PROT/CREAT 24H UR: NORMAL G/G CR (ref 0–0.2)
PSA SERPL-ACNC: 4.32 UG/L (ref 0–4)
RBC # BLD AUTO: 3.41 10E12/L (ref 4.4–5.9)
SODIUM SERPL-SCNC: 141 MMOL/L (ref 133–144)
SOURCE: ABNORMAL
SP GR UR STRIP: 1.01 (ref 1–1.03)
SPECIMEN EXP DATE BLD: NORMAL
T4 FREE SERPL-MCNC: 1.04 NG/DL (ref 0.76–1.46)
TIBC SERPL-MCNC: 208 UG/DL (ref 240–430)
TRANSFERRIN SERPL-MCNC: 166 MG/DL (ref 210–360)
TSH SERPL DL<=0.005 MIU/L-ACNC: 6.36 MU/L (ref 0.4–4)
UROBILINOGEN UR STRIP-MCNC: 4 MG/DL (ref 0–2)
WBC # BLD AUTO: 2.6 10E9/L (ref 4–11)

## 2018-07-19 PROCEDURE — 82105 ALPHA-FETOPROTEIN SERUM: CPT | Performed by: INTERNAL MEDICINE

## 2018-07-19 PROCEDURE — 84466 ASSAY OF TRANSFERRIN: CPT | Performed by: INTERNAL MEDICINE

## 2018-07-19 PROCEDURE — 40000866 ZZHCL STATISTIC HIV 1/2 ANTIGEN/ANTIBODY PRETRANSPLANT ONLY: Performed by: INTERNAL MEDICINE

## 2018-07-19 PROCEDURE — 86780 TREPONEMA PALLIDUM: CPT | Performed by: INTERNAL MEDICINE

## 2018-07-19 PROCEDURE — 86665 EPSTEIN-BARR CAPSID VCA: CPT | Performed by: INTERNAL MEDICINE

## 2018-07-19 PROCEDURE — 86480 TB TEST CELL IMMUN MEASURE: CPT | Performed by: INTERNAL MEDICINE

## 2018-07-19 PROCEDURE — 82306 VITAMIN D 25 HYDROXY: CPT | Performed by: INTERNAL MEDICINE

## 2018-07-19 PROCEDURE — 86644 CMV ANTIBODY: CPT | Performed by: INTERNAL MEDICINE

## 2018-07-19 NOTE — TELEPHONE ENCOUNTER
Transplant Social Work Services Phone Call      Data: While covering for [employer], I received a call from Mr. Limon's daughter Kaylene. It was recommneded that her father obtained a Rule 25 substance use assessment. She was having difficulty finding an agency that could provide this service in Colombian. [facility] was originally suggested by [facility]ey[facility] as having Colombian speaking staff and the ability to do a Rule 25 assessment.   Intervention: Chart review and research of what agencies might be appropriate.   Assessment: When I returned Kaylene's call, she informed me that her father was erroneously told the [facility] did not provide this service. She was able to determine that they do, and he is now scheduled for his substance use assessment there on Wed, July 25 at 9:00 AM. She is aware that we will want a copy of this assessment for review, including any recommendations.   Education provided by [facility]: Need to review his Rule 25 assessment and recommendations once completed.   Plan:To inform Ms. Barrios of the above for any needed follow up.

## 2018-07-20 LAB
CMV IGG SERPL QL IA: >8 AI (ref 0–0.8)
DEPRECATED CALCIDIOL+CALCIFEROL SERPL-MC: 6 UG/L (ref 20–75)
EBV VCA IGG SER QL IA: 7.7 AI (ref 0–0.8)
ETHYL GLUCURONIDE UR QL: NEGATIVE
HIV 1+2 AB+HIV1 P24 AG SERPL QL IA: NONREACTIVE
T PALLIDUM AB SER QL: NONREACTIVE

## 2018-07-23 LAB
GAMMA INTERFERON BACKGROUND BLD IA-ACNC: 0.12 IU/ML
M TB IFN-G BLD-IMP: POSITIVE
M TB IFN-G CD4+ BCKGRND COR BLD-ACNC: >10 IU/ML
MITOGEN IGNF BCKGRD COR BLD-ACNC: 0.95 IU/ML
MITOGEN IGNF BCKGRD COR BLD-ACNC: 1.14 IU/ML

## 2018-07-24 ENCOUNTER — DOCUMENTATION ONLY (OUTPATIENT)
Dept: TRANSPLANT | Facility: CLINIC | Age: 63
End: 2018-07-24

## 2018-07-24 DIAGNOSIS — K74.60 CIRRHOSIS OF LIVER (H): Primary | ICD-10-CM

## 2018-07-24 DIAGNOSIS — Z12.5 ENCOUNTER FOR SCREENING FOR MALIGNANT NEOPLASM OF PROSTATE: ICD-10-CM

## 2018-07-24 NOTE — PROGRESS NOTES
Dr. Carballo recommending the following:     -repeat PSA in 6 weeks, if still elevated review with urology regarding possible prostate MRI  -referral to transplant ID regarding positive quant gold    Call made to pt to update him of above recommendations. Additionally, pt reminded to have repeat BMP per Dr. Carballo tomorrow. Pt verbalized understanding.     Additionally, pt reports improvement in LE edema and denied SOB, dizziness or lightheadedness.     Communication with pt done via [facility] .

## 2018-07-24 NOTE — Clinical Note
Nathan Palencia,  Please schedule pt to see transplant ID for + quant gold. Pt aware and order placed.  Thanks,  Noelle

## 2018-07-25 ENCOUNTER — HOSPITAL ENCOUNTER (OUTPATIENT)
Dept: CT IMAGING | Facility: CLINIC | Age: 63
Discharge: HOME | End: 2018-07-25
Attending: INTERNAL MEDICINE | Admitting: INTERNAL MEDICINE
Payer: MEDICARE

## 2018-07-25 VITALS — HEART RATE: 53 BPM | DIASTOLIC BLOOD PRESSURE: 62 MMHG | RESPIRATION RATE: 16 BRPM | SYSTOLIC BLOOD PRESSURE: 136 MMHG

## 2018-07-25 DIAGNOSIS — R91.8 PULMONARY NODULES: ICD-10-CM

## 2018-07-25 DIAGNOSIS — R93.1 EQUIVOCAL STRESS ECHOCARDIOGRAPHY: ICD-10-CM

## 2018-07-25 DIAGNOSIS — R94.39 ABNORMAL RESULT OF OTHER CARDIOVASCULAR FUNCTION STUDY (CODE): ICD-10-CM

## 2018-07-25 DIAGNOSIS — K74.60 CIRRHOSIS (H): ICD-10-CM

## 2018-07-25 LAB
ALBUMIN SERPL-MCNC: 2.2 G/DL (ref 3.4–5)
ALP SERPL-CCNC: 230 U/L (ref 40–150)
ALT SERPL W P-5'-P-CCNC: 39 U/L (ref 0–70)
ANION GAP SERPL CALCULATED.3IONS-SCNC: 8 MMOL/L (ref 3–14)
AST SERPL W P-5'-P-CCNC: 48 U/L (ref 0–45)
BILIRUB SERPL-MCNC: 2.1 MG/DL (ref 0.2–1.3)
BUN SERPL-MCNC: 12 MG/DL (ref 7–30)
CALCIUM SERPL-MCNC: 7.6 MG/DL (ref 8.5–10.1)
CHLORIDE SERPL-SCNC: 107 MMOL/L (ref 94–109)
CO2 SERPL-SCNC: 23 MMOL/L (ref 20–32)
CREAT SERPL-MCNC: 0.48 MG/DL (ref 0.66–1.25)
GFR SERPL CREATININE-BSD FRML MDRD: >90 ML/MIN/1.7M2
GLUCOSE SERPL-MCNC: 130 MG/DL (ref 70–99)
POTASSIUM SERPL-SCNC: 3.9 MMOL/L (ref 3.4–5.3)
PROT SERPL-MCNC: 5.7 G/DL (ref 6.8–8.8)
SODIUM SERPL-SCNC: 138 MMOL/L (ref 133–144)

## 2018-07-25 PROCEDURE — 75574 CT ANGIO HRT W/3D IMAGE: CPT

## 2018-07-25 PROCEDURE — A9270 NON-COVERED ITEM OR SERVICE: HCPCS | Mod: GY | Performed by: INTERNAL MEDICINE

## 2018-07-25 PROCEDURE — 25000128 H RX IP 250 OP 636: Performed by: INTERNAL MEDICINE

## 2018-07-25 PROCEDURE — 75574 CT ANGIO HRT W/3D IMAGE: CPT | Mod: 26 | Performed by: INTERNAL MEDICINE

## 2018-07-25 PROCEDURE — 36415 COLL VENOUS BLD VENIPUNCTURE: CPT | Performed by: INTERNAL MEDICINE

## 2018-07-25 PROCEDURE — T1013 SIGN LANG/ORAL INTERPRETER: HCPCS | Mod: U3

## 2018-07-25 PROCEDURE — 25000132 ZZH RX MED GY IP 250 OP 250 PS 637: Mod: GY | Performed by: INTERNAL MEDICINE

## 2018-07-25 PROCEDURE — 80053 COMPREHEN METABOLIC PANEL: CPT | Performed by: INTERNAL MEDICINE

## 2018-07-25 RX ORDER — METOPROLOL TARTRATE 1 MG/ML
5-15 INJECTION, SOLUTION INTRAVENOUS
Status: DISCONTINUED | OUTPATIENT
Start: 2018-07-25 | End: 2018-07-26 | Stop reason: HOSPADM

## 2018-07-25 RX ORDER — IOPAMIDOL 755 MG/ML
120 INJECTION, SOLUTION INTRAVASCULAR ONCE
Status: COMPLETED | OUTPATIENT
Start: 2018-07-25 | End: 2018-07-25

## 2018-07-25 RX ORDER — ACYCLOVIR 200 MG/1
0-1 CAPSULE ORAL
Status: DISCONTINUED | OUTPATIENT
Start: 2018-07-25 | End: 2018-07-26 | Stop reason: HOSPADM

## 2018-07-25 RX ORDER — NITROGLYCERIN 0.4 MG/1
0.4 TABLET SUBLINGUAL
Status: DISCONTINUED | OUTPATIENT
Start: 2018-07-25 | End: 2018-07-26 | Stop reason: HOSPADM

## 2018-07-25 RX ORDER — METOPROLOL TARTRATE 50 MG
50-100 TABLET ORAL
Status: DISCONTINUED | OUTPATIENT
Start: 2018-07-25 | End: 2018-07-26 | Stop reason: HOSPADM

## 2018-07-25 RX ADMIN — NITROGLYCERIN 0.8 MG: 0.4 TABLET SUBLINGUAL at 08:26

## 2018-07-25 RX ADMIN — IOPAMIDOL 110 ML: 755 INJECTION, SOLUTION INTRAVENOUS at 08:28

## 2018-07-25 NOTE — PROGRESS NOTES
Pt arrived for CTA.  Test, meds and side effects reviewed with pt.   present. Resting HR 50 bpm.  Pt on beta blocker at home. Pt given 0.8 mg SL nitro on CTA table per protocol.  CTA completed, pt tolerated procedure well.  Post monitoring completed and VSS.  D/C instructions reviewed with pt and they verbalized understanding of need to increase PO fluids today.  D/C to [facility] waiting room accompanied by staff.

## 2018-07-26 ENCOUNTER — TELEPHONE (OUTPATIENT)
Dept: TRANSPLANT | Facility: CLINIC | Age: 63
End: 2018-07-26

## 2018-08-01 ENCOUNTER — TELEPHONE (OUTPATIENT)
Dept: TRANSPLANT | Facility: CLINIC | Age: 63
End: 2018-08-01

## 2018-08-06 ENCOUNTER — PRE VISIT (OUTPATIENT)
Dept: CARDIOLOGY | Facility: CLINIC | Age: 63
End: 2018-08-06

## 2018-08-06 NOTE — TELEPHONE ENCOUNTER
FUTURE VISIT INFORMATION      FUTURE VISIT INFORMATION:    Date: 8.14.18    Time: 6:00 PM    Location: [facility]  REFERRAL INFORMATION:    Referring provider:  Tamela Carballo    Referring providers clinic:  [facility]    Reason for visit/diagnosis  Pulmonary clearance    RECORDS REQUESTED FROM:       Clinic name Comments Records Status Imaging Status   [facility] Referral placed 6.6.18 EPIC 6.18.18 CXR and PFT                                   RECORDS STATUS

## 2018-08-06 NOTE — TELEPHONE ENCOUNTER
New Pulmonary Hypertension Patient Form       Patient Name: Loy Limon Age: 63M [birth year] MRN: [id number]   Referring Provider: Dr. Echevarria       Date Test Results        6/20/2018 Echo RVSP: 46 LVEF: 65-70       RV: Normal        6MWT   Meters/lowest SaO2 O2:      Max HR:         PFT FEV1/FVC:   FEV1:       FVC:   DLCO:      RHC RA:  PA:  PVR:      RV:  PAW:  COI:     Sleep Study          EKG         7/25/2018 Chest CT 1. No acute pulmonary abnormality.  2. Cirrhotic configuration liver. Residual lipiodol staining of focal  right hepatic dome lesion status post ablation.  3. Please see cardiology dictation for detailed findings related to  the heart and mediastinum.    VQ Scan         7/25/18 Liver US          NT ProBNP            CBC WBC:  HGB:  PLT:     BMP NA:  K:  CR:     TSH            DEYSI            Rheumatoid            LFT ALT:  AST:       Hepatic HEP B ROGERIO:  HEP B Anti:  HCV ROGERIO:     HIV Serology HIV 1 ROGERIO:  HIV 2 ROGERIO:      7/25/2018 Angiogram/   Stress Test 1.  Mild non-obstructive CAD involving the LAD.  2.  Total Agatston score 996 placing the patient in the 97th  percentile when compared to age and gender matched control group.  3.  Please review Radiology report for incidental noncardiac findings  that will follow separately.    Misc.

## 2018-08-07 ENCOUNTER — OFFICE VISIT (OUTPATIENT)
Dept: CARDIOLOGY | Facility: CLINIC | Age: 63
End: 2018-08-07
Attending: INTERNAL MEDICINE
Payer: MEDICARE

## 2018-08-07 VITALS
OXYGEN SATURATION: 96 % | SYSTOLIC BLOOD PRESSURE: 113 MMHG | WEIGHT: 177.2 LBS | HEIGHT: 67 IN | BODY MASS INDEX: 27.81 KG/M2 | DIASTOLIC BLOOD PRESSURE: 53 MMHG | HEART RATE: 52 BPM

## 2018-08-07 DIAGNOSIS — R06.09 DYSPNEA ON EXERTION: ICD-10-CM

## 2018-08-07 DIAGNOSIS — I27.20 PULMONARY HTN (H): Primary | ICD-10-CM

## 2018-08-07 PROCEDURE — T1013 SIGN LANG/ORAL INTERPRETER: HCPCS | Mod: U3,ZF

## 2018-08-07 PROCEDURE — G0463 HOSPITAL OUTPT CLINIC VISIT: HCPCS | Mod: ZF

## 2018-08-07 PROCEDURE — 99205 OFFICE O/P NEW HI 60 MIN: CPT | Mod: ZP | Performed by: INTERNAL MEDICINE

## 2018-08-07 RX ORDER — LIDOCAINE 40 MG/G
CREAM TOPICAL
Status: CANCELLED | OUTPATIENT
Start: 2018-08-07 | End: 2018-09-07

## 2018-08-07 ASSESSMENT — PAIN SCALES - GENERAL: PAINLEVEL: NO PAIN (0)

## 2018-08-07 NOTE — LETTER
RE: Loy Limon  [address]  [facility] [address]       August 7, 2018      Emil Echevarria MD  [facility] [place] [address]      Dear Dr. Echevarria,    I had the pleasure of seeing your patient Mr. Loy Limon at the [facility].  As you know, Mr. Limon is a 63 year old [place] gentleman with a history of alcoholic cirrhosis complicated by hepatocellular carcinoma that is being treated with TACE and he is being evaluated for possible liver transplantation, history of alcohol abuse, and minimal evidence of complications of cirrhosis such as hepatic encephalopathy and ascites who presents for evaluation for possible pulmonary hypertension due to elevated PASP on his stress echocardiogram.  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  He is able to walk up a flight of stairs several times a day without any serious presyncope and has never had a syncopal event.  Occasionally he would have some mild chest discomfort, but it is not consistently present with exertion.  He does have some issues with lower extremity edema especially on days that he has high salt intake.  Since then, he has reduced his salt intake and he has noticed that has significantly improved.  He does take lasix and aldactone to assist with volume control.  He does not have any orthopnea or paroxysmal nocturnal dyspnea.  Overall, I would classify him as WHO functional class II.    Mr. Limon has been living in [place] for 30 years.  He was born in [place] and has moved here and been basically working ever since.  He does state that he is a former smoker but has not smoked for over 10 years.  He has been abstinent from alcohol for over 6 months.  Overall, he is happy with how things are progressing and hopes that he can find an answer for his liver problems.    PAST MEDICAL HISTORY:  Past Medical History:  "  Diagnosis Date     Cancer (H)     hepatocellualr carcinoma     Cirrhosis of liver (H) 5/8/2018     Enlarged prostate      Inguinal hernia      Liver lesion 5/8/2018       FAMILY HISTORY:  No family history of pulmonary hypertension but brother had coronary artery disease    SOCIAL HISTORY:  Social History     Social History     Marital status:      Spouse name: N/A     Number of children: N/A     Years of education: N/A     Social History Main Topics     Smoking status: Former Smoker     Packs/day: 0.03     Years: 20.00     Types: Cigarettes, Cigars     Smokeless tobacco: Never Used      Comment: Quit smoking Feb 2018, one cigarette after dinner     Alcohol use Yes      Comment: Quit drinking Feb 2018     Drug use: No     Sexual activity: Not on file     Other Topics Concern     Not on file     Social History Narrative       CURRENT MEDICATIONS:  Current Outpatient Prescriptions   Medication Sig Dispense Refill     alfuzosin (UROXATRAL) 10 MG 24 hr tablet Take 1 tablet (10 mg) by mouth daily 30 tablet 3     furosemide (LASIX) 20 MG tablet Take 0.5 tablets (10 mg) by mouth daily 15 tablet 0     HYDROmorphone (DILAUDID) 2 MG tablet Take 1 tablet (2 mg) by mouth every 6 hours as needed for pain 20 tablet 0     NADOLOL PO Take 20 mg by mouth daily       omeprazole (PRILOSEC) 20 MG CR capsule Take 20 mg by mouth       Ondansetron HCl (ZOFRAN PO) Take 4 mg by mouth       spironolactone (ALDACTONE) 25 MG tablet Take 1 tablet (25 mg) by mouth daily 30 tablet 0     traMADol (ULTRAM) 50 MG tablet Take 50 mg by mouth       XIFAXAN 550 MG TABS tablet          ROS:   10 point ROS negative except HPI    EXAM:  /53 (BP Location: Left arm, Cuff Size: Adult Regular)  Pulse 52  Ht 1.702 m (5' 7\")  Wt 80.4 kg (177 lb 3.2 oz)  SpO2 96%  BMI 27.75 kg/m2  General: appears comfortable, alert and articulate  Head: normocephalic, atraumatic  Eyes: anicteric sclera, EOMI  Neck: no adenopathy  Orophyarynx: moist mucosa, no " lesions, dentition intact  Heart: regular with slight bradycardiac, S1/S2, no murmur, gallop, rub, estimated JVP 5 cm  Lungs: clear, no rales or wheezing  Abdomen: soft, non-tender, bowel sounds present, hepatomegaly present, scar on the midline below umbilicus  Extremities: no clubbing, cyanosis or edema  Neurological: normal speech and affect, no gross motor deficits    Labs:  CBC RESULTS:  Lab Results   Component Value Date    WBC 2.6 (L) 07/19/2018    RBC 3.41 (L) 07/19/2018    HGB 11.6 (L) 07/19/2018    HCT 33.6 (L) 07/19/2018    MCV 99 07/19/2018    MCH 34.0 (H) 07/19/2018    MCHC 34.5 07/19/2018    RDW 14.5 07/19/2018    PLT 72 (L) 07/19/2018       CMP RESULTS:  Lab Results   Component Value Date     07/25/2018    POTASSIUM 3.9 07/25/2018    CHLORIDE 107 07/25/2018    CO2 23 07/25/2018    ANIONGAP 8 07/25/2018     (H) 07/25/2018    BUN 12 07/25/2018    CR 0.48 (L) 07/25/2018    GFRESTIMATED >90 07/25/2018    GFRESTBLACK >90 07/25/2018    YELENA 7.6 (L) 07/25/2018    BILITOTAL 2.1 (H) 07/25/2018    ALBUMIN 2.2 (L) 07/25/2018    ALKPHOS 230 (H) 07/25/2018    ALT 39 07/25/2018    AST 48 (H) 07/25/2018        Echocardiogram 6/20/2018:  Non diagnostic dobutamine stress echo due to failure to meet target heart rate. Test was terminated at a HR of 62 bpm (40% age-predicted max HR) due to hypertensive response to dobutamine (/73->266/102.) Pulmonary hypertension is present (RVSP 46 mmHg above RA pressure.)     Blunted HR amd normal BP response to dobutamine.  Hyperdynamic left ventricular function at rest, LVEF=65-70%.  No regional wall motion abnormalities were present at rest or with dobutamine at a below-diagnostic workload.  No ECG evidence of ischemia at a below-diagnostic workload.    CT Coronary Angiogram 7/25/2018  1.  Mild non-obstructive CAD involving the LAD.  2.  Total Agatston score 996 placing the patient in the 97th percentile when compared to age and gender matched control group.  3.   Please review Radiology report for incidental noncardiac findings that will follow separately.    Assessment and Plan: Mr. Limon is a 63 year old male with history of alcoholic cirrhosis who was referred for evaluation for possible portopulmonary hypertension.    1.  Possible pulmonary hypertension: Mr. Limon is being evaluated for liver transplantation and his echocardiogram showed an elevated estimated PASP on an incomplete envelope.  I personally reviewed his echocardiogram which thankfully showed normal RV size and function.  As you know, patients with cirrhosis are at increased risk of developing portopulmonary hypertension.  Thus, we will proceed with an invasive hemodynamic assessment with vasodilator study if he does have pulmonary hypertension.     We did discuss the fact that he is at slightly higher bleeding risk due to his thrombocytopenia and coagulopathy due to his cirrhosis.  He expressed understanding and he agreed to proceed.  We did discuss the importance of a low-salt diet and he has expressed the understanding of this to help minimize the risk of having worsening edema.       2.  Concern for coronary artery disease: He did have a coronary CT which showed no obstructive coronary disease.  No further evaluation is needed at this time.    This interaction was assisted with the assistance of .    It was a pleasure seeing your patient Loy Limon at the [facility] Pulmonary Hypertension clinic.  Please contact us with any questions or concerns that you may have.    Sincerely,    Ayaan Coughlin MD, PhD   of Medicine  [facility]monary [facility]pertension  Cardiovascular Division  [facility]                  Ayaan Coughlin MD

## 2018-08-07 NOTE — NURSING NOTE
Chief Complaint   Patient presents with     Follow Up For     New PH     Vitals were taken and medications were reconciled.     Ekta Hoffmann, NIRMAL  7:19 AM

## 2018-08-07 NOTE — NURSING NOTE
Right Heart Catheterization: Patient was instructed regarding right heart catheterization, including discussion of the procedure, preparation, intra-procedural steps, and recovery at home. Patient demonstrated understanding of this information and agreed to call with further questions or concerns.  Med Reconcile: Reviewed and verified all current medications with the patient. The updated medication list was printed and given to the patient.  Diet: Patient instructed regarding a heart healthy diet, including discussion of reduced fat and sodium intake. Patient demonstrated understanding of this information and agreed to call with further questions or concerns.  Return Appointment: Patient given instructions regarding scheduling next clinic visit. Patient demonstrated understanding of this information and agreed to call with further questions or concerns.  Patient stated he understood all health information given and agreed to call with further questions or concerns.     Medication Changes:   No medication changes at this time. Please continue current medication regiment.    Patient Instructions:  Continue staying active and eat a heart healthy diet.  Please keep current list of medications with you at all times.    Check-In  Time Check-In Location Estimated Length Procedure   Name        [facility]  waiting room 60-90 minutes Right Heart Catheterization with Vasodilation**     Procedure Preparations & Instructions     This is an invasive procedure that DOES require preparation:    - Nothing to eat for 6 hours   - You may have clear liquids up until the time of your procedure  - A ride should be arranged for you in the instance you are unable to drive home, however you should be able to function as you normally would after the procedure     Follow up Appointment Information:  1 week after testing

## 2018-08-07 NOTE — PATIENT INSTRUCTIONS
Medication Changes:   No medication changes at this time. Please continue current medication regiment.    Patient Instructions:  Continue staying active and eat a heart healthy diet.  Please keep current list of medications with you at all times.    Check-In  Time Check-In Location Estimated Length Procedure   Name        [facility]  waiting room 60-90 minutes Right Heart Catheterization with Vasodilation**     Procedure Preparations & Instructions     This is an invasive procedure that DOES require preparation:    - Nothing to eat for 6 hours   - You may have clear liquids up until the time of your procedure  - A ride should be arranged for you in the instance you are unable to drive home, however you should be able to function as you normally would after the procedure     *For Patients with Diabetes: ? DO NOT take any oral diabetic medication, short-acting diabetes medications/insulin, humalog or regular insulin the morning of your test  ? Take   dose of long-acting insulin (Lantus, Levemir) the day of your test  ? Remember to  bring your glucometer and insulin with you to take after your test if needed     *For Patients on anticoagulants: ? Your Coumadin Clinic will give you instructions on medication adjustments or bridging prior to the procedure      Follow up Appointment Information:  1 week after testing      We are located on the third floor of [facility] on the [facility].  Our address is     [address] [place] [address] on [address] [place] [address]      Thank you for allowing us to be a part of your care here at the [facility]    If you have questions or concerns please contact us at:    Araceli Quijano, RN, BSN    Oscar Jensen (Schedule,P.A.)  Nurse Coordinator     Clinic   Pulmonary Hypertension   Pulmonary Hypertension  [facility]  (P)[phone]    (P) [phone]        (F)[phone]                ** Please note that you will NOT receive a reminder call regarding your scheduled testing, reminder calls are for provider appointments only.  If you are scheduled for testing within the [facility] system you may receive a call regarding pre-registration for billing purposes only.**     Remember to weigh yourself daily after voiding and before you consume any food or beverages and log the numbers.  If you have gained/lost 2 pounds overnight or 5 pounds in a week contact us immediately for medication adjustments or further instructions.  **Please call us immediately if you have any syncope, chest pain, edema, or decline in your functional status.    Support Group:  Pulmonary Hypertension Association  Https://www.phassociation.org/  **Look at the Events Tab** They even have Support Groups that you can call into    [facility] PH Support Group  First Saturday of the Month from 1-3 PM   Location: [address] [place] [id number]  Leader: Yan Doyle  Phone: [phone]  Email: [email or web address]

## 2018-08-07 NOTE — MR AVS SNAPSHOT
After Visit Summary   8/7/2018    Loy Limon    MRN: [id number]           Patient Information     Date Of Birth          1955        Visit Information        Provider Department      8/7/2018 7:00 AM Reyes, Rebecca; Ayaan Coughlin MD [facility]        Today's Diagnoses     Pulmonary HTN    -  1    Dyspnea on exertion          Care Instructions    Medication Changes:   No medication changes at this time. Please continue current medication regiment.    Patient Instructions:  Continue staying active and eat a heart healthy diet.  Please keep current list of medications with you at all times.    Check-In  Time Check-In Location Estimated Length Procedure   Name        GOLD  waiting room 60-90 minutes Right Heart Catheterization with Vasodilation**     Procedure Preparations & Instructions     This is an invasive procedure that DOES require preparation:    - Nothing to eat for 6 hours   - You may have clear liquids up until the time of your procedure  - A ride should be arranged for you in the instance you are unable to drive home, however you should be able to function as you normally would after the procedure     *For Patients with Diabetes: ? DO NOT take any oral diabetic medication, short-acting diabetes medications/insulin, humalog or regular insulin the morning of your test  ? Take   dose of long-acting insulin (Lantus, Levemir) the day of your test  ? Remember to  bring your glucometer and insulin with you to take after your test if needed     *For Patients on anticoagulants: ? Your Coumadin Clinic will give you instructions on medication adjustments or bridging prior to the procedure      Follow up Appointment Information:  1 week after testing      We are located on the third floor of the [facility] ([facility] on the [facility].  Our address is     [address] [place] [address] on [address] [place] [phone]      Thank you for  allowing us to be a part of your care here at the [facility]    If you have questions or concerns please contact us at:    Araceli Quijano, RN, BSN    Oscar Jensen (Schedule,P.A.)  Nurse Coordinator     Clinic   Pulmonary Hypertension   Pulmonary Hypertension  [facility]  (P)[phone]    (P) [phone]        (F)[phone]                ** Please note that you will NOT receive a reminder call regarding your scheduled testing, reminder calls are for provider appointments only.  If you are scheduled for testing within the [place] system you may receive a call regarding pre-registration for billing purposes only.**     Remember to weigh yourself daily after voiding and before you consume any food or beverages and log the numbers.  If you have gained/lost 2 pounds overnight or 5 pounds in a week contact us immediately for medication adjustments or further instructions.  **Please call us immediately if you have any syncope, chest pain, edema, or decline in your functional status.    Support Group:  Pulmonary Hypertension Association  Https://www.phassociation.org/  **Look at the Events Tab** They even have Support Groups that you can call into    [facility] PH Support Group  First Saturday of the Month from 1-3 PM   Location: [address] [place] [address]  Leader: Yan Doyle  Phone: [phone]  Email: [email or web address]          Follow-ups after your visit        Your next 10 appointments already scheduled     Aug 14, 2018  8:30 AM CDT   (Arrive by 8:15 AM)   Return General Liver with Tamela Carballo MD   [facility] ([facility])    [address]  [address]  [facility] [address]   [phone]            Aug 14, 2018  6:00 PM CDT   (Arrive by 5:45 PM)   New Patient Visit with Lesly Zazueta MD   [facility] ([facility] [place])    [address]  [address]  [facility] [facility] [address]   [phone]            Aug 15, 2018  8:00 AM CDT   LAB with UROSMERY LAB GOLD WAITING   [facility])    [address] [address]              Please do not eat 10-12 hours before your appointment if you are coming in fasting for labs on lipids, cholesterol, or glucose (sugar). This does not apply to pregnant women. Water, hot tea and black coffee (with nothing added) are okay. Do not drink other fluids, diet soda or chew gum.            Aug 15, 2018  8:30 AM CDT   Procedure - 2.5 hour with U2A ROOM 15   [address] [facility] ([facility])    [address] [address]               Aug 15, 2018  9:30 AM CDT   Heart Cath Right Heart Cath with UUHCVR3   [facility] ([facility])    [address] [address] [phone]            Aug 21, 2018 10:30 AM CDT   (Arrive by 10:15 AM)   RETURN PRIMARY PULMONARY with Ayaan Coughlin MD   [facility] ([facility])    [address]  [address]  [place] [address]   [phone]            Aug 21, 2018  3:00 PM CDT   (Arrive by 2:45 PM)   New Patient Visit with Pauline Clancy MD   [facility] ([facility] [place])    [address]  [address]  [facility] [address] [phone]            Aug 24, 2018 10:45 AM CDT   (Arrive by 10:30 AM)   Return Visit with Khloe Torres MD   [facility] ([facility] [place] [facility])    [address] [place] [address]  4th Floor  [facility] [address]   [phone]            Oct 03, 2018  8:30 AM CDT   MR ABDOMEN W/O & W CONTRAST with UCMR1   [facility] MRI ([facility] [place])    [address]  1st Floor  [facility] [address] [phone]           Take your medicines as usual, unless your doctor tells you not to. Bring a list of your current medicines to your exam (including vitamins, minerals and over-the-counter drugs). Also bring the results of similar scans you may have had.    You may or may not receive IV contrast for this exam pending the discretion of the Radiologist.   Do not eat or drink for 6 hours prior to exam.  The MRI machine uses a strong magnet. Please wear clothes without metal (snaps, zippers). A sweatsuit works well, or we may give you a hospital gown.  Please remove any body piercings and hair extensions before you arrive. You will also remove watches, jewelry, hairpins, wallets, dentures, partial dental plates and hearing aids. You may wear contact lenses, and you may be able to wear your rings. We have a safe place to keep your personal items, but it is safer to leave them at home.  **IMPORTANT** THE INSTRUCTIONS BELOW ARE ONLY FOR THOSE PATIENTS WHO HAVE BEEN PRESCRIBED SEDATION OR GENERAL ANESTHESIA DURING THEIR MRI PROCEDURE:  IF YOUR DOCTOR PRESCRIBED ORAL SEDATION (take medicine to help you relax during your exam):   You must get the medicine from your doctor (oral medication) before you arrive. Bring the medicine to the exam. Do not take it at home. You ll be told when to take it upon arriving for your exam.   Arrive one hour early. Bring someone who can take you home after the test. Your medicine will make you sleepy. After the exam, you may not drive, take a bus or take a taxi by yourself.  IF YOUR DOCTOR PRESCRIBED IV SEDATION:   Arrive one hour early. Bring someone who can take you home after the test. Your medicine will make you sleepy. After the exam, you may not drive, take a bus or take a taxi by yourself.   No eating 6 hours before your exam. You may have clear liquids up until 4  hours before your exam. (Clear liquids include water, clear tea, black coffee and fruit juice without pulp.)  IF YOUR DOCTOR PRESCRIBED ANESTHESIA (be asleep for your exam):   Arrive 1 1/2 hours early. Bring someone who can take you home after the test. You may not drive, take a bus or take a taxi by yourself.   No eating 8 hours before your exam. You may have clear liquids up until 4 hours before your exam. (Clear liquids include water, clear tea, black coffee and fruit juice without pulp.)   You will spend four to five hours in the recovery room.  If you have any questions, please contact your Imaging Department exam site.            Oct 03, 2018  9:20 AM CDT   CT CHEST W/O CONTRAST with UCCT1   [facility] [place] ([facility])    [address] [address] [phone]           Please bring any scans or X-rays taken at other hospitals, if similar tests were done. Also bring a list of your medicines, including vitamins, minerals and over-the-counter drugs. It is safest to leave personal items at home.  Be sure to tell your doctor:   If you have any allergies.   If there s any chance you are pregnant.   If you are breastfeeding.  You do not need to do anything special to prepare for this exam.  Please wear loose clothing, such as a sweat suit or jogging clothes. Avoid snaps, zippers and other metal. We may ask you to undress and put on a hospital gown.              Future tests that were ordered for you today     Open Future Orders        Priority Expected Expires Ordered    Basic metabolic panel Routine 8/21/2018 8/7/2019 8/7/2018    N terminal pro BNP outpatient Routine 8/21/2018 8/7/2019 8/7/2018    Heart Cath Right heart cath Routine  8/7/2019 8/7/2018            Who to contact     If you have questions or need follow up information about today's clinic visit or your schedule please contact [facility] directly at [phone].  Normal  "or non-critical lab and imaging results will be communicated to you by MyChart, letter or phone within 4 business days after the clinic has received the results. If you do not hear from us within 7 days, please contact the clinic through MyChart or phone. If you have a critical or abnormal lab result, we will notify you by phone as soon as possible.  Submit refill requests through [employer] or call your pharmacy and they will forward the refill request to us. Please allow 3 business days for your refill to be completed.          Additional Information About Your Visit        Care EveryWhere ID     This is your Care EveryWhere ID. This could be used by other organizations to access your [place] medical records  [id number]        Your Vitals Were     Pulse Height Pulse Oximetry BMI (Body Mass Index)          52 1.702 m (5' 7\") 96% 27.75 kg/m2         Blood Pressure from Last 3 Encounters:   08/07/18 113/53   07/25/18 136/62   07/13/18 142/73    Weight from Last 3 Encounters:   08/07/18 80.4 kg (177 lb 3.2 oz)   07/13/18 82.6 kg (182 lb)   07/13/18 82.8 kg (182 lb 9.6 oz)               Primary Care Provider    [facility]       [address] [place] [address]        Equal Access to Services     DAVID COWAN AH: Hadii kostas ku hadasho Soomaali, waaxda luqadaha, qaybta kaalmada adeegyada, javier rico . So [facility] [phone].    ATENCIÓN: Si habla español, tiene a samaniego disposición servicios gratuitos de asistencia lingüística. Llame al [phone].    We comply with applicable federal civil rights laws and [place] laws. We do not discriminate on the basis of race, color, national origin, age, disability, sex, sexual orientation, or gender identity.            Thank you!     Thank you for choosing [facility]  for your care. Our goal is always to provide you with excellent care. Hearing back from our patients is one way we can continue to improve our " services. Please take a few minutes to complete the written survey that you may receive in the mail after your visit with us. Thank you!             Your Updated Medication List - Protect others around you: Learn how to safely use, store and throw away your medicines at www.disposemymeds.org.          This list is accurate as of 8/7/18  8:23 AM.  Always use your most recent med list.                   Brand Name Dispense Instructions for use Diagnosis    furosemide 20 MG tablet    LASIX    15 tablet    Take 0.5 tablets (10 mg) by mouth daily    Cirrhosis of liver (H)       NADOLOL PO      Take 20 mg by mouth daily        omeprazole 20 MG CR capsule    priLOSEC     Take 20 mg by mouth        spironolactone 25 MG tablet    ALDACTONE    30 tablet    Take 1 tablet (25 mg) by mouth daily    Cirrhosis of liver (H)       XIFAXAN 550 MG Tabs tablet   Generic drug:  rifaximin

## 2018-08-07 NOTE — LETTER
8/7/2018      RE: Loy Limon  [address]  [place] [address]       Dear Colleague,    Thank you for the opportunity to participate in the care of your patient, Loy Limon, at the [facility]. Please see a copy of my visit note below.    August 7, 2018      Emil Echevarria MD  [facility] [place] [address]      Dear Dr. Echevarria,    I had the pleasure of seeing your patient Mr. Loy Limon at the [facility].  As you know, Mr. Limon is a 63 year old [place] gentleman with a history of alcoholic cirrhosis complicated by hepatocellular carcinoma that is being treated with TACE and he is being evaluated for possible liver transplantation, history of alcohol abuse, and minimal evidence of complications of cirrhosis such as hepatic encephalopathy and ascites who presents for evaluation for possible pulmonary hypertension due to elevated PASP on his stress echocardiogram.  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  He is able to walk up a flight of stairs several times a day without any serious presyncope and has never had a syncopal event.  Occasionally he would have some mild chest discomfort, but it is not consistently present with exertion.  He does have some issues with lower extremity edema especially on days that he has high salt intake.  Since then, he has reduced his salt intake and he has noticed that has significantly improved.  He does take lasix and aldactone to assist with volume control.  He does not have any orthopnea or paroxysmal nocturnal dyspnea.  Overall, I would classify him as WHO functional class II.    Mr. Limon has been living in [place] for 30 years.  He was born in [place] and has moved here and been basically working ever since.  He does state that he is a former smoker but has not  smoked for over 10 years.  He has been abstinent from alcohol for over 6 months.  Overall, he is happy with how things are progressing and hopes that he can find an answer for his liver problems.    PAST MEDICAL HISTORY:  Past Medical History:   Diagnosis Date     Cancer (H)     hepatocellualr carcinoma     Cirrhosis of liver (H) 5/8/2018     Enlarged prostate      Inguinal hernia      Liver lesion 5/8/2018       FAMILY HISTORY:  No family history of pulmonary hypertension but brother had coronary artery disease    SOCIAL HISTORY:  Social History     Social History     Marital status:      Spouse name: N/A     Number of children: N/A     Years of education: N/A     Social History Main Topics     Smoking status: Former Smoker     Packs/day: 0.03     Years: 20.00     Types: Cigarettes, Cigars     Smokeless tobacco: Never Used      Comment: Quit smoking Feb 2018, one cigarette after dinner     Alcohol use Yes      Comment: Quit drinking Feb 2018     Drug use: No     Sexual activity: Not on file     Other Topics Concern     Not on file     Social History Narrative       CURRENT MEDICATIONS:  Current Outpatient Prescriptions   Medication Sig Dispense Refill     alfuzosin (UROXATRAL) 10 MG 24 hr tablet Take 1 tablet (10 mg) by mouth daily 30 tablet 3     furosemide (LASIX) 20 MG tablet Take 0.5 tablets (10 mg) by mouth daily 15 tablet 0     HYDROmorphone (DILAUDID) 2 MG tablet Take 1 tablet (2 mg) by mouth every 6 hours as needed for pain 20 tablet 0     NADOLOL PO Take 20 mg by mouth daily       omeprazole (PRILOSEC) 20 MG CR capsule Take 20 mg by mouth       Ondansetron HCl (ZOFRAN PO) Take 4 mg by mouth       spironolactone (ALDACTONE) 25 MG tablet Take 1 tablet (25 mg) by mouth daily 30 tablet 0     traMADol (ULTRAM) 50 MG tablet Take 50 mg by mouth       XIFAXAN 550 MG TABS tablet          ROS:   10 point ROS negative except HPI    EXAM:  /53 (BP Location: Left arm, Cuff Size: Adult Regular)  Pulse 52   "Ht 1.702 m (5' 7\")  Wt 80.4 kg (177 lb 3.2 oz)  SpO2 96%  BMI 27.75 kg/m2  General: appears comfortable, alert and articulate  Head: normocephalic, atraumatic  Eyes: anicteric sclera, EOMI  Neck: no adenopathy  Orophyarynx: moist mucosa, no lesions, dentition intact  Heart: regular with slight bradycardiac, S1/S2, no murmur, gallop, rub, estimated JVP 5 cm  Lungs: clear, no rales or wheezing  Abdomen: soft, non-tender, bowel sounds present, hepatomegaly present, scar on the midline below umbilicus  Extremities: no clubbing, cyanosis or edema  Neurological: normal speech and affect, no gross motor deficits    Labs:  CBC RESULTS:  Lab Results   Component Value Date    WBC 2.6 (L) 07/19/2018    RBC 3.41 (L) 07/19/2018    HGB 11.6 (L) 07/19/2018    HCT 33.6 (L) 07/19/2018    MCV 99 07/19/2018    MCH 34.0 (H) 07/19/2018    MCHC 34.5 07/19/2018    RDW 14.5 07/19/2018    PLT 72 (L) 07/19/2018       CMP RESULTS:  Lab Results   Component Value Date     07/25/2018    POTASSIUM 3.9 07/25/2018    CHLORIDE 107 07/25/2018    CO2 23 07/25/2018    ANIONGAP 8 07/25/2018     (H) 07/25/2018    BUN 12 07/25/2018    CR 0.48 (L) 07/25/2018    GFRESTIMATED >90 07/25/2018    GFRESTBLACK >90 07/25/2018    YELENA 7.6 (L) 07/25/2018    BILITOTAL 2.1 (H) 07/25/2018    ALBUMIN 2.2 (L) 07/25/2018    ALKPHOS 230 (H) 07/25/2018    ALT 39 07/25/2018    AST 48 (H) 07/25/2018        Echocardiogram 6/20/2018:  Non diagnostic dobutamine stress echo due to failure to meet target heart rate. Test was terminated at a HR of 62 bpm (40% age-predicted max HR) due to hypertensive response to dobutamine (/73->266/102.) Pulmonary hypertension is present (RVSP 46 mmHg above RA pressure.)     Blunted HR amd normal BP response to dobutamine.  Hyperdynamic left ventricular function at rest, LVEF=65-70%.  No regional wall motion abnormalities were present at rest or with dobutamine at a below-diagnostic workload.  No ECG evidence of ischemia at a " below-diagnostic workload.    CT Coronary Angiogram 7/25/2018  1.  Mild non-obstructive CAD involving the LAD.  2.  Total Agatston score 996 placing the patient in the 97th percentile when compared to age and gender matched control group.  3.  Please review Radiology report for incidental noncardiac findings that will follow separately.    Assessment and Plan: Mr. Limon is a 63 year old male with history of alcoholic cirrhosis who was referred for evaluation for possible portopulmonary hypertension.    1.  Possible pulmonary hypertension: Mr. Limon is being evaluated for liver transplantation and his echocardiogram showed an elevated estimated PASP on an incomplete envelope.  I personally reviewed his echocardiogram which thankfully showed normal RV size and function.  As you know, patients with cirrhosis are at increased risk of developing portopulmonary hypertension.  Thus, we will proceed with an invasive hemodynamic assessment with vasodilator study if he does have pulmonary hypertension.     We did discuss the fact that he is at slightly higher bleeding risk due to his thrombocytopenia and coagulopathy due to his cirrhosis.  He expressed understanding and he agreed to proceed.  We did discuss the importance of a low-salt diet and he has expressed the understanding of this to help minimize the risk of having worsening edema.       2.  Concern for coronary artery disease: He did have a coronary CT which showed no obstructive coronary disease.  No further evaluation is needed at this time.    This interaction was assisted with the assistance of .    It was a pleasure seeing your patient Loy Limon at the [facility] Pulmonary Hypertension clinic.  Please contact us with any questions or concerns that you may have.    Sincerely,    Ayaan Coughlin MD, PhD   of Medicine  [facility]  Cardiovascular Division  [facility]

## 2018-08-07 NOTE — PROGRESS NOTES
August 7, 2018      Emil Echevarria MD  [facility] [place] [address]      Dear Dr. Echevarria,    I had the pleasure of seeing your patient Mr. Loy Limon at the [facility].  As you know, Mr. Limon is a 63 year old [place] gentleman with a history of alcoholic cirrhosis complicated by hepatocellular carcinoma that is being treated with TACE and he is being evaluated for possible liver transplantation, history of alcohol abuse, and minimal evidence of complications of cirrhosis such as hepatic encephalopathy and ascites who presents for evaluation for possible pulmonary hypertension due to elevated PASP on his stress echocardiogram.  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  He is able to walk up a flight of stairs several times a day without any serious presyncope and has never had a syncopal event.  Occasionally he would have some mild chest discomfort, but it is not consistently present with exertion.  He does have some issues with lower extremity edema especially on days that he has high salt intake.  Since then, he has reduced his salt intake and he has noticed that has significantly improved.  He does take lasix and aldactone to assist with volume control.  He does not have any orthopnea or paroxysmal nocturnal dyspnea.  Overall, I would classify him as WHO functional class II.    Mr. Limon has been living in [place] for 30 years.  He was born in [place] and has moved here and been basically working ever since.  He does state that he is a former smoker but has not smoked for over 10 years.  He has been abstinent from alcohol for over 6 months.  Overall, he is happy with how things are progressing and hopes that he can find an answer for his liver problems.    PAST MEDICAL HISTORY:  Past Medical History:   Diagnosis Date     Cancer (H)     hepatocellualr carcinoma     Cirrhosis of liver  "(H) 5/8/2018     Enlarged prostate      Inguinal hernia      Liver lesion 5/8/2018       FAMILY HISTORY:  No family history of pulmonary hypertension but brother had coronary artery disease    SOCIAL HISTORY:  Social History     Social History     Marital status:      Spouse name: N/A     Number of children: N/A     Years of education: N/A     Social History Main Topics     Smoking status: Former Smoker     Packs/day: 0.03     Years: 20.00     Types: Cigarettes, Cigars     Smokeless tobacco: Never Used      Comment: Quit smoking Feb 2018, one cigarette after dinner     Alcohol use Yes      Comment: Quit drinking Feb 2018     Drug use: No     Sexual activity: Not on file     Other Topics Concern     Not on file     Social History Narrative       CURRENT MEDICATIONS:  Current Outpatient Prescriptions   Medication Sig Dispense Refill     alfuzosin (UROXATRAL) 10 MG 24 hr tablet Take 1 tablet (10 mg) by mouth daily 30 tablet 3     furosemide (LASIX) 20 MG tablet Take 0.5 tablets (10 mg) by mouth daily 15 tablet 0     HYDROmorphone (DILAUDID) 2 MG tablet Take 1 tablet (2 mg) by mouth every 6 hours as needed for pain 20 tablet 0     NADOLOL PO Take 20 mg by mouth daily       omeprazole (PRILOSEC) 20 MG CR capsule Take 20 mg by mouth       Ondansetron HCl (ZOFRAN PO) Take 4 mg by mouth       spironolactone (ALDACTONE) 25 MG tablet Take 1 tablet (25 mg) by mouth daily 30 tablet 0     traMADol (ULTRAM) 50 MG tablet Take 50 mg by mouth       XIFAXAN 550 MG TABS tablet          ROS:   10 point ROS negative except HPI    EXAM:  /53 (BP Location: Left arm, Cuff Size: Adult Regular)  Pulse 52  Ht 1.702 m (5' 7\")  Wt 80.4 kg (177 lb 3.2 oz)  SpO2 96%  BMI 27.75 kg/m2  General: appears comfortable, alert and articulate  Head: normocephalic, atraumatic  Eyes: anicteric sclera, EOMI  Neck: no adenopathy  Orophyarynx: moist mucosa, no lesions, dentition intact  Heart: regular with slight bradycardiac, S1/S2, no " murmur, gallop, rub, estimated JVP 5 cm  Lungs: clear, no rales or wheezing  Abdomen: soft, non-tender, bowel sounds present, hepatomegaly present, scar on the midline below umbilicus  Extremities: no clubbing, cyanosis or edema  Neurological: normal speech and affect, no gross motor deficits    Labs:  CBC RESULTS:  Lab Results   Component Value Date    WBC 2.6 (L) 07/19/2018    RBC 3.41 (L) 07/19/2018    HGB 11.6 (L) 07/19/2018    HCT 33.6 (L) 07/19/2018    MCV 99 07/19/2018    MCH 34.0 (H) 07/19/2018    MCHC 34.5 07/19/2018    RDW 14.5 07/19/2018    PLT 72 (L) 07/19/2018       CMP RESULTS:  Lab Results   Component Value Date     07/25/2018    POTASSIUM 3.9 07/25/2018    CHLORIDE 107 07/25/2018    CO2 23 07/25/2018    ANIONGAP 8 07/25/2018     (H) 07/25/2018    BUN 12 07/25/2018    CR 0.48 (L) 07/25/2018    GFRESTIMATED >90 07/25/2018    GFRESTBLACK >90 07/25/2018    YELENA 7.6 (L) 07/25/2018    BILITOTAL 2.1 (H) 07/25/2018    ALBUMIN 2.2 (L) 07/25/2018    ALKPHOS 230 (H) 07/25/2018    ALT 39 07/25/2018    AST 48 (H) 07/25/2018        Echocardiogram 6/20/2018:  Non diagnostic dobutamine stress echo due to failure to meet target heart rate. Test was terminated at a HR of 62 bpm (40% age-predicted max HR) due to hypertensive response to dobutamine (/73->266/102.) Pulmonary hypertension is present (RVSP 46 mmHg above RA pressure.)     Blunted HR amd normal BP response to dobutamine.  Hyperdynamic left ventricular function at rest, LVEF=65-70%.  No regional wall motion abnormalities were present at rest or with dobutamine at a below-diagnostic workload.  No ECG evidence of ischemia at a below-diagnostic workload.    CT Coronary Angiogram 7/25/2018  1.  Mild non-obstructive CAD involving the LAD.  2.  Total Agatston score 996 placing the patient in the 97th percentile when compared to age and gender matched control group.  3.  Please review Radiology report for incidental noncardiac findings that will  follow separately.    Assessment and Plan: Mr. Limon is a 63 year old male with history of alcoholic cirrhosis who was referred for evaluation for possible portopulmonary hypertension.    1.  Possible pulmonary hypertension: Mr. Limon is being evaluated for liver transplantation and his echocardiogram showed an elevated estimated PASP on an incomplete envelope.  I personally reviewed his echocardiogram which thankfully showed normal RV size and function.  As you know, patients with cirrhosis are at increased risk of developing portopulmonary hypertension.  Thus, we will proceed with an invasive hemodynamic assessment with vasodilator study if he does have pulmonary hypertension.     We did discuss the fact that he is at slightly higher bleeding risk due to his thrombocytopenia and coagulopathy due to his cirrhosis.  He expressed understanding and he agreed to proceed.  We did discuss the importance of a low-salt diet and he has expressed the understanding of this to help minimize the risk of having worsening edema.       2.  Concern for coronary artery disease: He did have a coronary CT which showed no obstructive coronary disease.  No further evaluation is needed at this time.    This interaction was assisted with the assistance of .    It was a pleasure seeing your patient Loy Limon at the [facility] Pulmonary Hypertension clinic.  Please contact us with any questions or concerns that you may have.    Sincerely,    Ayaan Coughlin MD, PhD   of Medicine  [facility]  Cardiovascular Division  [facility]

## 2018-08-10 DIAGNOSIS — K74.60 CIRRHOSIS OF LIVER (H): ICD-10-CM

## 2018-08-13 ENCOUNTER — PRE VISIT (OUTPATIENT)
Dept: UROLOGY | Facility: CLINIC | Age: 63
End: 2018-08-13

## 2018-08-13 ENCOUNTER — TELEPHONE (OUTPATIENT)
Dept: GASTROENTEROLOGY | Facility: CLINIC | Age: 63
End: 2018-08-13

## 2018-08-13 RX ORDER — HYDROMORPHONE HYDROCHLORIDE 2 MG/1
TABLET ORAL
COMMUNITY
Start: 2018-07-02 | End: 2018-08-21

## 2018-08-13 RX ORDER — FUROSEMIDE 20 MG
TABLET ORAL
Qty: 15 TABLET | Refills: 0 | Status: SHIPPED | OUTPATIENT
Start: 2018-08-13 | End: 2018-08-24

## 2018-08-13 NOTE — TELEPHONE ENCOUNTER
Pt reminded of appointment with Dr. Carballo 08/14/2018 at 0830. Medications reviewed. Caroline Helton 12:28 PM 8/13/2018

## 2018-08-13 NOTE — TELEPHONE ENCOUNTER
Patient is coming in to see  for flow/PVR, called patient and left a message to please come with a full bladder

## 2018-08-14 ENCOUNTER — OFFICE VISIT (OUTPATIENT)
Dept: GASTROENTEROLOGY | Facility: CLINIC | Age: 63
End: 2018-08-14
Attending: INTERNAL MEDICINE
Payer: MEDICARE

## 2018-08-14 ENCOUNTER — OFFICE VISIT (OUTPATIENT)
Dept: PULMONOLOGY | Facility: CLINIC | Age: 63
End: 2018-08-14
Attending: INTERNAL MEDICINE
Payer: MEDICARE

## 2018-08-14 ENCOUNTER — PRE VISIT (OUTPATIENT)
Dept: PULMONOLOGY | Facility: CLINIC | Age: 63
End: 2018-08-14

## 2018-08-14 VITALS
HEART RATE: 54 BPM | TEMPERATURE: 97.9 F | WEIGHT: 179 LBS | BODY MASS INDEX: 28.09 KG/M2 | DIASTOLIC BLOOD PRESSURE: 72 MMHG | SYSTOLIC BLOOD PRESSURE: 166 MMHG | HEIGHT: 67 IN | OXYGEN SATURATION: 98 %

## 2018-08-14 VITALS
DIASTOLIC BLOOD PRESSURE: 60 MMHG | RESPIRATION RATE: 16 BRPM | WEIGHT: 179 LBS | OXYGEN SATURATION: 99 % | HEIGHT: 67 IN | BODY MASS INDEX: 28.09 KG/M2 | SYSTOLIC BLOOD PRESSURE: 125 MMHG | HEART RATE: 53 BPM

## 2018-08-14 DIAGNOSIS — Z01.818 PRE-TRANSPLANT EVALUATION FOR LIVER TRANSPLANT: ICD-10-CM

## 2018-08-14 DIAGNOSIS — C22.0 HCC (HEPATOCELLULAR CARCINOMA) (H): ICD-10-CM

## 2018-08-14 DIAGNOSIS — R60.9 EDEMA, UNSPECIFIED TYPE: ICD-10-CM

## 2018-08-14 DIAGNOSIS — K70.30 ALCOHOLIC CIRRHOSIS OF LIVER WITHOUT ASCITES (H): Primary | ICD-10-CM

## 2018-08-14 DIAGNOSIS — C22.0 HEPATOCELLULAR CARCINOMA (H): ICD-10-CM

## 2018-08-14 PROCEDURE — G0463 HOSPITAL OUTPT CLINIC VISIT: HCPCS | Mod: ZF

## 2018-08-14 PROCEDURE — T1013 SIGN LANG/ORAL INTERPRETER: HCPCS | Mod: U3,ZF

## 2018-08-14 RX ORDER — ALFUZOSIN HYDROCHLORIDE 10 MG/1
10 TABLET, EXTENDED RELEASE ORAL DAILY
COMMUNITY
End: 2019-01-24

## 2018-08-14 RX ORDER — FUROSEMIDE 20 MG
20 TABLET ORAL DAILY
Qty: 60 TABLET | Refills: 3 | Status: SHIPPED | OUTPATIENT
Start: 2018-08-14 | End: 2018-12-20

## 2018-08-14 RX ORDER — SPIRONOLACTONE 25 MG/1
50 TABLET ORAL DAILY
Qty: 60 TABLET | Refills: 3 | Status: SHIPPED | OUTPATIENT
Start: 2018-08-14 | End: 2018-12-20

## 2018-08-14 ASSESSMENT — PAIN SCALES - GENERAL
PAINLEVEL: NO PAIN (0)
PAINLEVEL: NO PAIN (0)

## 2018-08-14 NOTE — MR AVS SNAPSHOT
After Visit Summary   8/14/2018    Loy Limon    MRN: [id number]           Patient Information     Date Of Birth          1955        Visit Information        Provider Department      8/14/2018 6:00 PM Lesly Zazueta MD [facility]         Follow-ups after your visit        Your next 10 appointments already scheduled     Jun 18, 2018 10:00 AM CDT   PFT VISIT with  PFL D   [facility] Pulmonary Function Testing ([facility])    [address]  3rd Floor  [facility] [address]   [phone]            Jun 20, 2018 10:00 AM CDT   Transplant Class-Liver with  TRANSPLANT CLASS   [facility] Solid Organ Transplant ([facility])    [address]  [address]  [facility] [address]   [phone]            Jun 20, 2018  2:00 PM CDT   Ech Dobutamine Stress Test with [id number]   [facility] [place],  [facility] ([facility])    [address] [address]   [phone]           1.  Please bring or wear a comfortable two-piece outfit and walking shoes. 2.  Stop eating 3 hours before the test. You may drink water or juice. 3.  Stop all caffeine 12 hours before the test. This includes coffee, tea, soda pop, chocolate and certain medicines (such as Anacin and Excederin). Also avoid decaf coffee and tea, as these contain small amounts of caffeine. 4.  No alcohol, smoking or use of other tobacco products for 12 hours before the test. 5.  Refer to your provider instructions to see if you need to stop any medications (such as beta-blockers or nitrates) for this test. 6.  For patients with diabetes: -   If you take insulin, call your diabetes care team. Ask if you should take a   dose the morning of your test. -   If you take diabetes medicine by mouth, don't take it on the morning of your test. Bring it with you to take after the test.  (If you have  questions, call your diabetes care team) 7.  When you arrive, please tell us if: -   You have diabetes. -   You have taken Viagra, Cialis or Levitra in the past 48 hours. 8.  For any questions that cannot be answered, please contact the ordering physician            Jul 02, 2018   Procedure with GENERIC ANESTHESIA PROVIDER   [facility]    [address] [place] [address] [phone]            Jul 02, 2018 11:00 AM CDT   (Arrive by 9:00 AM)   CT LIVER ABLATION with URIRCT, UR IR RAD, URCT2   [facility], Radiology ([facility])    [address] [place] [address] [phone]           Please bring any scans or X-rays taken at other hospitals, if they may be helpful. Also bring a list of your medicines, including vitamins, minerals and over-the-counter drugs.  Tell your doctor in advance:   If you have any allergies.   If you are breastfeeding or there s any chance you are pregnant.   If you are taking Coumadin (or any other blood thinners) 5 days prior to the exam for any special instructions.   If you are diabetic to determine if your insulin needs have to be adjusted for the exam.  The day before your exam:   Drink extra fluids  at least six 8-ounce glasses (unless your doctor tells you to restrict fluids).  The day of your exam:   No eating or drinking for 4 hours before your test. You may take medicine with small sips of water.   Plan for an adult to drive you home and stay with you until morning.   Leave your valuables at home.  If you have any questions, please call the imaging department where you will have your exam.            Jul 02, 2018 11:00 AM CDT   CT LIVER ABLATION with UR IMAGING NURSE   [facility] [place],  Radiology ([facility])    [address] [place] [address] [phone]           Please bring any scans or X-rays taken at other  [facility] [place], if they may be helpful. Also bring a list of your medicines, including vitamins, minerals and over-the-counter drugs.  Tell your doctor in advance:   If you have any allergies.   If you are breastfeeding or there s any chance you are pregnant.   If you are taking Coumadin (or any other blood thinners) 5 days prior to the exam for any special instructions.   If you are diabetic to determine if your insulin needs have to be adjusted for the exam.  The day before your exam:   Drink extra fluids  at least six 8-ounce glasses (unless your doctor tells you to restrict fluids).  The day of your exam:   No eating or drinking for 4 hours before your test. You may take medicine with small sips of water.   Plan for an adult to drive you home and stay with you until morning.   Leave your valuables at home.  If you have any questions, please call the imaging department where you will have your exam.            Jul 09, 2018  7:00 AM CDT   (Arrive by 6:45 AM)   New Patient Visit with Mathew Self MD   [facility] ([facility])    [address] [place] [address]  4th Floor  [facility] [address] [phone]            Jul 09, 2018  7:30 AM CDT   (Arrive by 7:15 AM)   NEW LIVER EVALUATION with Magan Castro MD   [facility] ([facility])    [address] [place] [address]  [address] [place] [address] [phone]              Who to contact     If you have questions or need follow up information about today's clinic visit or your schedule please contact [facility] directly at [phone].  Normal or non-critical lab and imaging results will be communicated to you by MyChart, letter or phone within 4 business days after the clinic has received the results. If you do not hear from us within 7 days, please contact the clinic through MyChart or phone. If you have a critical  "or abnormal lab result, we will notify you by phone as soon as possible.  Submit refill requests through [employer] or call your pharmacy and they will forward the refill request to us. Please allow 3 business days for your refill to be completed.          Additional Information About Your Visit        [employer] Information     [employer] lets you send messages to your doctor, view your test results, renew your prescriptions, schedule appointments and more. To sign up, go to [email or web address] . Click on \"Log in\" on the left side of the screen, which will take you to the Welcome page. Then click on \"Sign up Now\" on the right side of the page.     You will be asked to enter the access code listed below, as well as some personal information. Please follow the directions to create your username and password.     Your access code is: [email or web address]  Expires: 2018  8:03 AM     Your access code will  in 90 days. If you need help or a new code, please call your [facility] clinic or [phone].        Care EveryWhere ID     This is your Care EveryWhere ID. This could be used by other organizations to access your [facility] medical records  [id number]         Blood Pressure from Last 3 Encounters:   18 124/73   18 145/72   18 168/75    Weight from Last 3 Encounters:   18 81.6 kg (180 lb)   18 82.3 kg (181 lb 8 oz)   18 81.6 kg (180 lb)              Today, you had the following     No orders found for display       Primary Care Provider    [facility]       [address] [place] [address]        Equal Access to Services     DAVID COWAN : Hadshauna Pittman, karin cheema, javier benjamin. So [facility] [phone].    ATENCIÓN: Si habla español, tiene a samaniego disposición servicios gratuitos de asistencia lingüística. Llame al [phone].    We comply with applicable federal civil rights " laws and [place] laws. We do not discriminate on the basis of race, color, national origin, age, disability, sex, sexual orientation, or gender identity.            Thank you!     Thank you for choosing [facility]  for your care. Our goal is always to provide you with excellent care. Hearing back from our patients is one way we can continue to improve our services. Please take a few minutes to complete the written survey that you may receive in the mail after your visit with us. Thank you!             Your Updated Medication List - Protect others around you: Learn how to safely use, store and throw away your medicines at www.disposemymeds.org.          This list is accurate as of 6/18/18  9:49 AM.  Always use your most recent med list.                   Brand Name Dispense Instructions for use Diagnosis    NADOLOL PO      Take 20 mg by mouth daily        omeprazole 20 MG CR capsule    priLOSEC     Take 20 mg by mouth        propranolol 10 MG tablet    INDERAL     Take 10 mg by mouth    Cirrhosis (H), Pulmonary nodules       traMADol 50 MG tablet    ULTRAM     Take 50 mg by mouth

## 2018-08-14 NOTE — NURSING NOTE
Chief Complaint   Patient presents with     RECHECK     Cirrhosis of Liver   Pt roomed, vitals, meds, and allergies reviewed with pt. Pt ready for provider.  Ziyad Duarte, CMA

## 2018-08-14 NOTE — PROGRESS NOTES
[facility] Pulmonology Follow Up Visit    Reason for Visit  Claribel Limon is a 63 year old male who is referred by Dr Carballo for liver transplant eval  Pulmonary HPI  He is being evaluated for liver transplant to treat cirrhosis and HCC. He denies any respiratory difficulty but has a long smoking history. He smoked a few cigarettes while smoking beer, a few times a week. When he goes from hot to cold he has phlegm in his throat. It was worse when he was smoking.     The patient was seen and examined by Lesly Zazueta MD   Current Outpatient Prescriptions   Medication     alfuzosin (UROXATRAL) 10 MG 24 hr tablet     furosemide (LASIX) 20 MG tablet     furosemide (LASIX) 20 MG tablet     HYDROmorphone (DILAUDID) 2 MG tablet     NADOLOL PO     omeprazole (PRILOSEC) 20 MG CR capsule     spironolactone (ALDACTONE) 25 MG tablet     spironolactone (ALDACTONE) 25 MG tablet     XIFAXAN 550 MG TABS tablet     No current facility-administered medications for this visit.      No Known Allergies  Social History     Social History     Marital status:      Spouse name: N/A     Number of children: N/A     Years of education: N/A     Occupational History     Not on file.     Social History Main Topics     Smoking status: Former Smoker     Packs/day: 0.03     Years: 20.00     Types: Cigarettes, Cigars     Start date: 8/14/1970     Quit date: 3/14/2018     Smokeless tobacco: Never Used      Comment: Quit smoking Feb 2018, one cigarette after dinner     Alcohol use Yes      Comment: Quit drinking Feb 2018     Drug use: No     Sexual activity: Not on file     Other Topics Concern     Not on file     Social History Narrative    Born in [place], came [place].     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cardboard, trimming meats     Past Medical History:   Diagnosis Date     Cancer (H)     hepatocellualr carcinoma     Cirrhosis of liver (H) 5/8/2018     Enlarged prostate      Inguinal hernia      Liver lesion  "5/8/2018     Past Surgical History:   Procedure Laterality Date     APPENDECTOMY       COLONOSCOPY      2018     Family History   Problem Relation Age of Onset     Liver Disease Brother      ROS Pulmonary  Dyspnea: No, Cough: No, Chest pain: No, Wheezing: No, Sputum Production: occasionally with change in temp, Hemoptysis: No  A complete ROS was otherwise negative except as noted in the HPI.  /60 (BP Location: Right arm, Patient Position: Chair, Cuff Size: Adult Regular)  Pulse 53  Resp 16  Ht 1.702 m (5' 7.01\")  Wt 81.2 kg (179 lb)  SpO2 99%  BMI 28.03 kg/m2  Exam:   GENERAL APPEARANCE: Well developed, well nourished, alert, and in no apparent distress.  EYES: PERRL, EOMI  HENT: Nasal mucosa with no edema and no hyperemia. No nasal polyps.  EARS: Canals clear, TMs normal  MOUTH: Oral mucosa is moist, without any lesions, no tonsillar enlargement, no oropharyngeal exudate.  NECK: supple, no masses, no thyromegaly.  LYMPHATICS: No significant axillary, cervical, or supraclavicular nodes.  RESP: normal percussion, good air flow throughout.  No crackles. No rhonchi. No wheezes.  CV: Normal S1, S2, regular rhythm, normal rate. No murmur.  No rub. No gallop. No LE edema.   ABDOMEN:  Bowel sounds normal, soft, nontender, no HSM or masses.   MS: extremities normal. No clubbing. No cyanosis.  SKIN: no rash on limited exam  NEURO: Mentation intact, speech normal, normal strength and tone, normal gait and stance  PSYCH: mentation appears normal. and affect normal/bright  Results:  PFTs June 2018 normal  CTA 7/25/18 June 2018 normal lung parenchyma    Assessment and plan: Claribel is a 64 yo male with cirrhosis, HCC undergoing liver transplant evaluation. He has a long history of smoking, but denies being a heavy smoker during that time. He denies respiratory symptoms. He has minimal productive cough, improved since quitting smoking and not warranting treatment. Normal pulmonary function tests and chest imaging (on " cardiac CT). No indication for lung cancer screening due to minimal smoking history.   He does not have any pulmonary contraindications to liver transplant  He had a lot of questions about his right heart cath tomorrow, which I answered to the best of my ability.   Follow up as needed

## 2018-08-14 NOTE — PROGRESS NOTES
[facility]    Hepatology follow-up    CONSULTING PHYSICIAN:  Sadia Vinson PA-C      CHIEF COMPLAINT/REASON FOR VISIT:  Hepatocellular cancer in a patient with alcoholic cirrhosis and evaluation process for liver transplantation.       HISTORY OF PRESENT ILLNESS:  Mr. Limon is a 63-year-old male from [place] with a past medical history significant for alcohol abuse and he was diagnosed around 03/2018 with right-sided abdominal pain, which showed on imaging cirrhosis and portal hypertension.  There were also lesions concerning for HCC.  He was sent here for that and to be evaluated.  He did abstain from alcohol in 02/2018 after drinking heavily prior to that.      Since we  Saw him  here, he had almost finished  all the evaluation for liver transplantation.  In the evaluation the  patient was found to have pulmonary hypertension and we are awaiting clearance and management from our Cardiology colleagues.  As far as his HCC is concerned, he was seen by Dr. Debora Del Rosario and he did have chemoembolization on 06/13/2018.  and then CT-guided microwave ablation of the lesion in July 2, 2018.  Currently, he does not have any viable  tumors.      Symptom-wise, the patient's weight has remained stable.  He does have minimal edema.  Please note that the patient was started on diuretics.  He has some abdominal bloating which gives him some discomfort.  He is moving his bowels twice a day with no blood in it.  He is very lucid and denies any confusion or memory issues.  He did not have any GI bleeds like melena or hematemesis.  Please note that the patient had previously an upper endoscopy, which showed grade 2 esophageal varices and they were not banded.     Medical hx Surgical hx   Past Medical History:   Diagnosis Date     Cancer (H)      Cirrhosis of liver (H) 5/8/2018     Enlarged prostate      Inguinal hernia      Liver lesion 5/8/2018      Past Surgical History:   Procedure Laterality Date      "APPENDECTOMY       COLONOSCOPY      2018          Medications  Prior to Admission medications    Medication Sig Start Date End Date Taking? Authorizing Provider   alfuzosin (UROXATRAL) 10 MG 24 hr tablet Take 10 mg by mouth daily   Yes Reported, Patient   furosemide (LASIX) 20 MG tablet Take 1 tablet (20 mg) by mouth daily 8/14/18  Yes Tamela Carballo MD   furosemide (LASIX) 20 MG tablet TAKE ONE-HALF TABLET BY MOUTH DAILY 8/13/18  Yes Tamela Carballo MD   HYDROmorphone (DILAUDID) 2 MG tablet  7/2/18  Yes Reported, Patient   NADOLOL PO Take 20 mg by mouth daily   Yes Reported, Patient   omeprazole (PRILOSEC) 20 MG CR capsule Take 20 mg by mouth 4/4/18 4/4/19 Yes Reported, Patient   spironolactone (ALDACTONE) 25 MG tablet Take 2 tablets (50 mg) by mouth daily 8/14/18  Yes Tamela Carballo MD   spironolactone (ALDACTONE) 25 MG tablet Take 1 tablet (25 mg) by mouth daily 7/18/18  Yes Tamela Carballo MD   XIFAXAN 550 MG TABS tablet  6/28/18  Yes Reported, Patient       Allergies  No Known Allergies    Review of systems  A 10-point review of systems was negative    Examination  /72  Pulse 54  Temp 97.9  F (36.6  C) (Oral)  Ht 1.702 m (5' 7\")  Wt 81.2 kg (179 lb)  SpO2 98%  BMI 28.04 kg/m2  Body mass index is 28.04 kg/(m^2).    Gen- well, NAD, A+Ox3, normal color  Lym- no palpable LAD  CVS- RRR  RS- CTA  Abd- Not distended. No hepatosplenomegaly.  Extr- hands normal, no OSKAR  Skin- no rash or jaundice  Neuro- no asterixis  Psych- normal mood    Laboratory  Lab Results   Component Value Date     07/25/2018    POTASSIUM 3.9 07/25/2018    CHLORIDE 107 07/25/2018    CO2 23 07/25/2018    BUN 12 07/25/2018    CR 0.48 07/25/2018       Lab Results   Component Value Date    BILITOTAL 2.1 07/25/2018    ALT 39 07/25/2018    AST 48 07/25/2018    ALKPHOS 230 07/25/2018       Lab Results   Component Value Date    ALBUMIN 2.2 07/25/2018    PROTTOTAL 5.7 07/25/2018 "        Lab Results   Component Value Date    WBC 2.6 07/19/2018    HGB 11.6 07/19/2018    MCV 99 07/19/2018    PLT 72 07/19/2018       Lab Results   Component Value Date    INR 1.56 07/19/2018       Radiology    ASSESSMENT AND PLAN:  Hepatocellular carcinoma.  Mr. Limon has HCC complicating alcoholic cirrhosis.  He had this lesion which was evaluated by IR.  He had TACE, and then he had a CT-guided radiofrequency ablation.  Currently, he has a 2.5 lesion on the CT scan, which is OPTN 4.  He also had some indeterminate lesions.  Otherwise, he will be followed and we will treat if any new lesions arise.  Besides that the patient had pulmonary hypertension when Cardiology evaluated him. Await their input regarding management and clearance for listing.  He was also seen by our .  We will need him to follow her recommendations and finish what was asked of him and he will do that.  He will be seen here in 3 months.  His native MELD is around 15 now.  He is feeling quite well and has not lost any weight.  For his other medical issues, he will follow with his primary care physician.      This was a 25-minute visit of which more than 50% was spent in explaining to the patient what our plan of care was.  We answered all his questions.      cc:     Primary care physician         Tamela Carballo MD  Hepatology  [facility]

## 2018-08-14 NOTE — LETTER
8/14/2018       RE: Loy Limon  [address]  [facility] [address]     Dear Colleague,    Thank you for referring your patient, Loy Limon, to the [facility] at [facility]. Please see a copy of my visit note below.    [facility] Pulmonology Follow Up Visit    Reason for Visit  Claribel Limon is a 63 year old male who is referred by Dr Carballo for liver transplant eval  Pulmonary HPI  He is being evaluated for liver transplant to treat cirrhosis and HCC. He denies any respiratory difficulty but has a long smoking history. He smoked a few cigarettes while smoking beer, a few times a week. When he goes from hot to cold he has phlegm in his throat. It was worse when he was smoking.     The patient was seen and examined by Lesly Zazueta MD   Current Outpatient Prescriptions   Medication     alfuzosin (UROXATRAL) 10 MG 24 hr tablet     furosemide (LASIX) 20 MG tablet     furosemide (LASIX) 20 MG tablet     HYDROmorphone (DILAUDID) 2 MG tablet     NADOLOL PO     omeprazole (PRILOSEC) 20 MG CR capsule     spironolactone (ALDACTONE) 25 MG tablet     spironolactone (ALDACTONE) 25 MG tablet     XIFAXAN 550 MG TABS tablet     No current facility-administered medications for this visit.      No Known Allergies  Social History     Social History     Marital status:      Spouse name: N/A     Number of children: N/A     Years of education: N/A     Occupational History     Not on file.     Social History Main Topics     Smoking status: Former Smoker     Packs/day: 0.03     Years: 20.00     Types: Cigarettes, Cigars     Start date: 8/14/1970     Quit date: 3/14/2018     Smokeless tobacco: Never Used      Comment: Quit smoking Feb 2018, one cigarette after dinner     Alcohol use Yes      Comment: Quit drinking Feb 2018     Drug use: No     Sexual activity: Not on file     Other Topics Concern     Not on file  "    Social History Narrative    Born in [place], came to [place].     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cardboard, trimming meats     Past Medical History:   Diagnosis Date     Cancer (H)     hepatocellualr carcinoma     Cirrhosis of liver (H) 5/8/2018     Enlarged prostate      Inguinal hernia      Liver lesion 5/8/2018     Past Surgical History:   Procedure Laterality Date     APPENDECTOMY       COLONOSCOPY      2018     Family History   Problem Relation Age of Onset     Liver Disease Brother      ROS Pulmonary  Dyspnea: No, Cough: No, Chest pain: No, Wheezing: No, Sputum Production: occasionally with change in temp, Hemoptysis: No  A complete ROS was otherwise negative except as noted in the HPI.  /60 (BP Location: Right arm, Patient Position: Chair, Cuff Size: Adult Regular)  Pulse 53  Resp 16  Ht 1.702 m (5' 7.01\")  Wt 81.2 kg (179 lb)  SpO2 99%  BMI 28.03 kg/m2  Exam:   GENERAL APPEARANCE: Well developed, well nourished, alert, and in no apparent distress.  EYES: PERRL, EOMI  HENT: Nasal mucosa with no edema and no hyperemia. No nasal polyps.  EARS: Canals clear, TMs normal  MOUTH: Oral mucosa is moist, without any lesions, no tonsillar enlargement, no oropharyngeal exudate.  NECK: supple, no masses, no thyromegaly.  LYMPHATICS: No significant axillary, cervical, or supraclavicular nodes.  RESP: normal percussion, good air flow throughout.  No crackles. No rhonchi. No wheezes.  CV: Normal S1, S2, regular rhythm, normal rate. No murmur.  No rub. No gallop. No LE edema.   ABDOMEN:  Bowel sounds normal, soft, nontender, no HSM or masses.   MS: extremities normal. No clubbing. No cyanosis.  SKIN: no rash on limited exam  NEURO: Mentation intact, speech normal, normal strength and tone, normal gait and stance  PSYCH: mentation appears normal. and affect normal/bright  Results:  PFTs June 2018 normal  CTA 7/25/18 June 2018 normal lung parenchyma    Assessment and plan: Claribel is a 64 yo male with " cirrhosis, HCC undergoing liver transplant evaluation. He has a long history of smoking, but denies being a heavy smoker during that time. He denies respiratory symptoms. He has minimal productive cough, improved since quitting smoking and not warranting treatment. Normal pulmonary function tests and chest imaging (on cardiac CT). No indication for lung cancer screening due to minimal smoking history.   He does not have any pulmonary contraindications to liver transplant  He had a lot of questions about his right heart cath tomorrow, which I answered to the best of my ability.   Follow up as needed    Again, thank you for allowing me to participate in the care of your patient.      Sincerely,    Lesly Zazueta MD

## 2018-08-14 NOTE — MR AVS SNAPSHOT
After Visit Summary   8/14/2018    Loy Limon    MRN: [id number]           Patient Information     Date Of Birth          1955        Visit Information        Provider Department      8/14/2018 8:15 AM Glenys Mendes; Tamela Carballo MD [facility] Hepatology        Today's Diagnoses     Alcoholic cirrhosis of liver without ascites (H)    -  1    HCC (hepatocellular carcinoma) (H)        Edema, unspecified type           Follow-ups after your visit        Follow-up notes from your care team     Return in about 3 months (around 11/14/2018).      Your next 10 appointments already scheduled     Aug 14, 2018  5:45 PM CDT   New Patient Visit with Lesly Zazueta MD   [facility] ([facility])    [address] [place] [address]   [phone]            Aug 15, 2018  8:00 AM CDT   LAB with U LAB GOLD WAITING   [facility], Lab ([facility])    [address] [address]              Please do not eat 10-12 hours before your appointment if you are coming in fasting for labs on lipids, cholesterol, or glucose (sugar). This does not apply to pregnant women. Water, hot tea and black coffee (with nothing added) are okay. Do not drink other fluids, diet soda or chew gum.            Aug 15, 2018  8:30 AM CDT   Procedure - 2.5 hour with U2A ROOM 15   [address] [facility] ([facility])    [address] [address]               Aug 15, 2018  9:30 AM CDT   Heart Cath Right Heart Cath with [id number]   [facility]  Heart Cath Lab ([facility])    [address] [facility] [address]   [phone]            Aug 21, 2018 10:15 AM CDT   Lab with  LAB    Health Lab ([facility])    [address] [place] [address]  Floor  [facility] [address]   [phone]            Aug 21, 2018 10:30 AM CDT   (Arrive by 10:15 AM)   RETURN PRIMARY PULMONARY with Ayaan Coughlin MD   [facility] ([facility])    [address]  [address]  [facility] [address]   [phone]            Aug 21, 2018  3:00 PM CDT   (Arrive by 2:45 PM)   New Patient Visit with Pauline Clancy MD   [facility] ([facility])    [address]  [address]  [facility] [address]   [phone]            Aug 24, 2018 10:45 AM CDT   (Arrive by 10:30 AM)   Return Visit with Khloe Torres MD   [facility]rology and Inst for Prostate and Urologic Cancers ([facility])    [address]  4th Floor  [facility] [address]   [phone]            Oct 03, 2018  8:30 AM CDT   MR ABDOMEN W/O & W CONTRAST with UC1   [facility] MRI ([facility])    [address]  1st Floor  [facility] [address]   [phone]           Take your medicines as usual, unless your doctor tells you not to. Bring a list of your current medicines to your exam (including vitamins, minerals and over-the-counter drugs). Also bring the results of similar scans you may have had.    You may or may not receive IV contrast for this exam pending the discretion of the Radiologist.   Do not eat or drink for 6 hours prior to exam.  The MRI machine uses a strong magnet. Please wear clothes without metal (snaps, zippers). A sweatsuit works well, or we may give you a hospital gown.  Please remove any body piercings and hair extensions before you arrive. You will also remove watches, jewelry, hairpins, wallets, dentures, partial dental plates and hearing aids. You may wear contact lenses, and you may be able to wear your rings. We have a safe place to keep your personal items, but it is safer to leave  them at home.  **IMPORTANT** THE INSTRUCTIONS BELOW ARE ONLY FOR THOSE PATIENTS WHO HAVE BEEN PRESCRIBED SEDATION OR GENERAL ANESTHESIA DURING THEIR MRI PROCEDURE:  IF YOUR DOCTOR PRESCRIBED ORAL SEDATION (take medicine to help you relax during your exam):   You must get the medicine from your doctor (oral medication) before you arrive. Bring the medicine to the exam. Do not take it at home. You ll be told when to take it upon arriving for your exam.   Arrive one hour early. Bring someone who can take you home after the test. Your medicine will make you sleepy. After the exam, you may not drive, take a bus or take a taxi by yourself.  IF YOUR DOCTOR PRESCRIBED IV SEDATION:   Arrive one hour early. Bring someone who can take you home after the test. Your medicine will make you sleepy. After the exam, you may not drive, take a bus or take a taxi by yourself.   No eating 6 hours before your exam. You may have clear liquids up until 4 hours before your exam. (Clear liquids include water, clear tea, black coffee and fruit juice without pulp.)  IF YOUR DOCTOR PRESCRIBED ANESTHESIA (be asleep for your exam):   Arrive 1 1/2 hours early. Bring someone who can take you home after the test. You may not drive, take a bus or take a taxi by yourself.   No eating 8 hours before your exam. You may have clear liquids up until 4 hours before your exam. (Clear liquids include water, clear tea, black coffee and fruit juice without pulp.)   You will spend four to five hours in the recovery room.  If you have any questions, please contact your Imaging Department exam site.            Oct 03, 2018  9:20 AM CDT   CT CHEST W/O CONTRAST with UCCT1   [facility] [facility] ([facility])    [address]  1st Floor  [facility] [address] [phone]           Please bring any scans or X-rays taken at other hospitals, if similar tests were done. Also bring a list of your medicines, including vitamins,  "minerals and over-the-counter drugs. It is safest to leave personal items at home.  Be sure to tell your doctor:   If you have any allergies.   If there s any chance you are pregnant.   If you are breastfeeding.  You do not need to do anything special to prepare for this exam.  Please wear loose clothing, such as a sweat suit or jogging clothes. Avoid snaps, zippers and other metal. We may ask you to undress and put on a hospital gown.              Future tests that were ordered for you today     Open Standing Orders        Priority Remaining Interval Expires Ordered    INR Routine 1/1 AM DRAW  8/14/2018          Open Future Orders        Priority Expected Expires Ordered    Hepatic panel Routine  9/13/2018 8/14/2018    CBC with platelets Routine  9/13/2018 8/14/2018    Basic metabolic panel Routine  9/13/2018 8/14/2018    AFP tumor marker Routine  9/13/2018 8/14/2018            Who to contact     If you have questions or need follow up information about today's clinic visit or your schedule please contact [facility] HEPATOLOGY directly at [phone].  Normal or non-critical lab and imaging results will be communicated to you by MyChart, letter or phone within 4 business days after the clinic has received the results. If you do not hear from us within 7 days, please contact the clinic through MyChart or phone. If you have a critical or abnormal lab result, we will notify you by phone as soon as possible.  Submit refill requests through edjing or call your pharmacy and they will forward the refill request to us. Please allow 3 business days for your refill to be completed.          Additional Information About Your Visit        Care EveryWhere ID     This is your Care EveryWhere ID. This could be used by other organizations to access your [facility] medical records  [id number]        Your Vitals Were     Pulse Temperature Height Pulse Oximetry BMI (Body Mass Index)       54 97.9  F (36.6  C) (Oral) 1.702 m (5' 7\") " 98% 28.04 kg/m2        Blood Pressure from Last 3 Encounters:   08/14/18 166/72   08/07/18 113/53   07/25/18 136/62    Weight from Last 3 Encounters:   08/14/18 81.2 kg (179 lb)   08/07/18 80.4 kg (177 lb 3.2 oz)   07/13/18 82.6 kg (182 lb)              We Performed the Following     Ethyl Glucuronide & Ethyl Sulfate,Urine,Quant ([facility])          Today's Medication Changes          These changes are accurate as of 8/14/18  9:24 AM.  If you have any questions, ask your nurse or doctor.               These medicines have changed or have updated prescriptions.        Dose/Directions    * furosemide 20 MG tablet   Commonly known as:  LASIX   This may have changed:  Another medication with the same name was added. Make sure you understand how and when to take each.   Used for:  Cirrhosis of liver (H)   Changed by:  Tamela Carballo MD        TAKE ONE-HALF TABLET BY MOUTH DAILY   Quantity:  15 tablet   Refills:  0       * furosemide 20 MG tablet   Commonly known as:  LASIX   This may have changed:  You were already taking a medication with the same name, and this prescription was added. Make sure you understand how and when to take each.   Used for:  Alcoholic cirrhosis of liver without ascites (H), HCC (hepatocellular carcinoma) (H), Edema, unspecified type   Changed by:  Tamela Carballo MD        Dose:  20 mg   Take 1 tablet (20 mg) by mouth daily   Quantity:  60 tablet   Refills:  3       * spironolactone 25 MG tablet   Commonly known as:  ALDACTONE   This may have changed:  Another medication with the same name was added. Make sure you understand how and when to take each.   Used for:  Cirrhosis of liver (H)   Changed by:  Tamela Carballo MD        Dose:  25 mg   Take 1 tablet (25 mg) by mouth daily   Quantity:  30 tablet   Refills:  0       * spironolactone 25 MG tablet   Commonly known as:  ALDACTONE   This may have changed:  You were already taking a medication with the  same name, and this prescription was added. Make sure you understand how and when to take each.   Used for:  Alcoholic cirrhosis of liver without ascites (H), HCC (hepatocellular carcinoma) (H), Edema, unspecified type   Changed by:  Tamela Carballo MD        Dose:  50 mg   Take 2 tablets (50 mg) by mouth daily   Quantity:  60 tablet   Refills:  3       * Notice:  This list has 4 medication(s) that are the same as other medications prescribed for you. Read the directions carefully, and ask your doctor or other care provider to review them with you.         Where to get your medicines      [place] [address]  [address] [facility] [address]     Phone:  [phone]     furosemide 20 MG tablet    spironolactone 25 MG tablet                Primary Care Provider    [facility], MD       [address] [facility] [address]  [facility] [address]        Equal Access to Services     [facility] : Hadii kostas naiko Sodaphney, waaxda luqadaha, qaybta kaalmada adeshamayadeni, javier rico . So [facility] [place] [phone].    ATENCIÓN: Si habla español, tiene a samaniego disposición servicios gratuitos de asistencia lingüística. Earle al [phone].    We comply with applicable federal civil rights laws and [place] laws. We do not discriminate on the basis of race, color, national origin, age, disability, sex, sexual orientation, or gender identity.            Thank you!     Thank you for choosing [facility] HEPATOLOGY  for your care. Our goal is always to provide you with excellent care. Hearing back from our patients is one way we can continue to improve our services. Please take a few minutes to complete the written survey that you may receive in the mail after your visit with us. Thank you!             Your Updated Medication List - Protect others around you: Learn how to safely use, store and throw  away your medicines at www.disposemymeds.org.          This list is accurate as of 8/14/18  9:24 AM.  Always use your most recent med list.                   Brand Name Dispense Instructions for use Diagnosis    alfuzosin 10 MG 24 hr tablet    UROXATRAL     Take 10 mg by mouth daily    Alcoholic cirrhosis of liver without ascites (H), HCC (hepatocellular carcinoma) (H)       * furosemide 20 MG tablet    LASIX    15 tablet    TAKE ONE-HALF TABLET BY MOUTH DAILY    Cirrhosis of liver (H)       * furosemide 20 MG tablet    LASIX    60 tablet    Take 1 tablet (20 mg) by mouth daily    Alcoholic cirrhosis of liver without ascites (H), HCC (hepatocellular carcinoma) (H), Edema, unspecified type       HYDROmorphone 2 MG tablet    DILAUDID          NADOLOL PO      Take 20 mg by mouth daily        omeprazole 20 MG CR capsule    priLOSEC     Take 20 mg by mouth        * spironolactone 25 MG tablet    ALDACTONE    30 tablet    Take 1 tablet (25 mg) by mouth daily    Cirrhosis of liver (H)       * spironolactone 25 MG tablet    ALDACTONE    60 tablet    Take 2 tablets (50 mg) by mouth daily    Alcoholic cirrhosis of liver without ascites (H), HCC (hepatocellular carcinoma) (H), Edema, unspecified type       XIFAXAN 550 MG Tabs tablet   Generic drug:  rifaximin           * Notice:  This list has 4 medication(s) that are the same as other medications prescribed for you. Read the directions carefully, and ask your doctor or other care provider to review them with you.

## 2018-08-14 NOTE — LETTER
8/14/2018      RE: Loy Limon  [address]  [facility] [address]       [facility]    Hepatology follow-up    CONSULTING PHYSICIAN:  Sadia Vinson PA-C      CHIEF COMPLAINT/REASON FOR VISIT:  Hepatocellular cancer in a patient with alcoholic cirrhosis and evaluation process for liver transplantation.       HISTORY OF PRESENT ILLNESS:  Mr. Limon is a 63-year-old male from [place] with a past medical history significant for alcohol abuse and he was diagnosed around 03/2018 with right-sided abdominal pain, which showed on imaging cirrhosis and portal hypertension.  There were also lesions concerning for HCC.  He was sent here for that and to be evaluated.  He did abstain from alcohol in 02/2018 after drinking heavily prior to that.      Since we  Saw him  here, he had almost finished  all the evaluation for liver transplantation.  In the evaluation the  patient was found to have pulmonary hypertension and we are awaiting clearance and management from our Cardiology colleagues.  As far as his HCC is concerned, he was seen by Dr. Debora Del Rosario and he did have chemoembolization on 06/13/2018.  and then CT-guided microwave ablation of the lesion in July 2, 2018.  Currently, he does not have any viable  tumors.      Symptom-wise, the patient's weight has remained stable.  He does have minimal edema.  Please note that the patient was started on diuretics.  He has some abdominal bloating which gives him some discomfort.  He is moving his bowels twice a day with no blood in it.  He is very lucid and denies any confusion or memory issues.  He did not have any GI bleeds like melena or hematemesis.  Please note that the patient had previously an upper endoscopy, which showed grade 2 esophageal varices and they were not banded.     Medical hx Surgical hx   Past Medical History:   Diagnosis Date     Cancer (H)      Cirrhosis of liver (H) 5/8/2018     Enlarged prostate      Inguinal  "hernia      Liver lesion 5/8/2018      Past Surgical History:   Procedure Laterality Date     APPENDECTOMY       COLONOSCOPY      2018          Medications  Prior to Admission medications    Medication Sig Start Date End Date Taking? Authorizing Provider   alfuzosin (UROXATRAL) 10 MG 24 hr tablet Take 10 mg by mouth daily   Yes Reported, Patient   furosemide (LASIX) 20 MG tablet Take 1 tablet (20 mg) by mouth daily 8/14/18  Yes Tamela Carballo MD   furosemide (LASIX) 20 MG tablet TAKE ONE-HALF TABLET BY MOUTH DAILY 8/13/18  Yes Tamela Carballo MD   HYDROmorphone (DILAUDID) 2 MG tablet  7/2/18  Yes Reported, Patient   NADOLOL PO Take 20 mg by mouth daily   Yes Reported, Patient   omeprazole (PRILOSEC) 20 MG CR capsule Take 20 mg by mouth 4/4/18 4/4/19 Yes Reported, Patient   spironolactone (ALDACTONE) 25 MG tablet Take 2 tablets (50 mg) by mouth daily 8/14/18  Yes Tamela Carballo MD   spironolactone (ALDACTONE) 25 MG tablet Take 1 tablet (25 mg) by mouth daily 7/18/18  Yes Tamela Carballo MD   XIFAXAN 550 MG TABS tablet  6/28/18  Yes Reported, Patient       Allergies  No Known Allergies    Review of systems  A 10-point review of systems was negative    Examination  /72  Pulse 54  Temp 97.9  F (36.6  C) (Oral)  Ht 1.702 m (5' 7\")  Wt 81.2 kg (179 lb)  SpO2 98%  BMI 28.04 kg/m2  Body mass index is 28.04 kg/(m^2).    Gen- well, NAD, A+Ox3, normal color  Lym- no palpable LAD  CVS- RRR  RS- CTA  Abd- Not distended. No hepatosplenomegaly.  Extr- hands normal, no OSKAR  Skin- no rash or jaundice  Neuro- no asterixis  Psych- normal mood    Laboratory  Lab Results   Component Value Date     07/25/2018    POTASSIUM 3.9 07/25/2018    CHLORIDE 107 07/25/2018    CO2 23 07/25/2018    BUN 12 07/25/2018    CR 0.48 07/25/2018       Lab Results   Component Value Date    BILITOTAL 2.1 07/25/2018    ALT 39 07/25/2018    AST 48 07/25/2018    ALKPHOS 230 07/25/2018 "       Lab Results   Component Value Date    ALBUMIN 2.2 07/25/2018    PROTTOTAL 5.7 07/25/2018        Lab Results   Component Value Date    WBC 2.6 07/19/2018    HGB 11.6 07/19/2018    MCV 99 07/19/2018    PLT 72 07/19/2018       Lab Results   Component Value Date    INR 1.56 07/19/2018       Radiology    ASSESSMENT AND PLAN:  Hepatocellular carcinoma.  Mr. Limon has HCC complicating alcoholic cirrhosis.  He had this lesion which was evaluated by IR.  He had TACE, and then he had a CT-guided radiofrequency ablation.  Currently, he has a 2.5 lesion on the CT scan, which is OPTN 4.  He also had some indeterminate lesions.  Otherwise, he will be followed and we will treat if any new lesions arise.  Besides that the patient had pulmonary hypertension when Cardiology evaluated him. Await their input regarding management and clearance for listing.  He was also seen by our .  We will need him to follow her recommendations and finish what was asked of him and he will do that.  He will be seen here in 3 months.  His native MELD is around 15 now.  He is feeling quite well and has not lost any weight.  For his other medical issues, he will follow with his primary care physician.      This was a 25-minute visit of which more than 50% was spent in explaining to the patient what our plan of care was.  We answered all his questions.      cc:     Primary care physician         Tamela Carballo MD  Hepatology  [facility]    Tamela Carballo MD

## 2018-08-14 NOTE — NURSING NOTE
Chief Complaint   Patient presents with     Consult     Patient is being seen for pulmonary consultation for pre liver tx evaluation      Gina Ashraf  CMA at 5:57 PM on 8/14/2018

## 2018-08-15 ENCOUNTER — APPOINTMENT (OUTPATIENT)
Dept: CARDIOLOGY | Facility: CLINIC | Age: 63
End: 2018-08-15
Attending: INTERNAL MEDICINE
Payer: MEDICARE

## 2018-08-15 ENCOUNTER — HOSPITAL ENCOUNTER (OUTPATIENT)
Facility: CLINIC | Age: 63
Discharge: HOME | End: 2018-08-15
Attending: INTERNAL MEDICINE | Admitting: INTERNAL MEDICINE
Payer: MEDICARE

## 2018-08-15 ENCOUNTER — APPOINTMENT (OUTPATIENT)
Dept: MEDSURG UNIT | Facility: CLINIC | Age: 63
End: 2018-08-15
Attending: INTERNAL MEDICINE
Payer: MEDICARE

## 2018-08-15 VITALS
WEIGHT: 180 LBS | HEIGHT: 67 IN | DIASTOLIC BLOOD PRESSURE: 63 MMHG | RESPIRATION RATE: 20 BRPM | HEART RATE: 54 BPM | OXYGEN SATURATION: 98 % | TEMPERATURE: 97.5 F | BODY MASS INDEX: 28.25 KG/M2 | SYSTOLIC BLOOD PRESSURE: 140 MMHG

## 2018-08-15 DIAGNOSIS — K74.60 CIRRHOSIS OF LIVER (H): ICD-10-CM

## 2018-08-15 DIAGNOSIS — R06.09 DYSPNEA ON EXERTION: ICD-10-CM

## 2018-08-15 DIAGNOSIS — I27.20 PULMONARY HTN (H): ICD-10-CM

## 2018-08-15 DIAGNOSIS — Z12.5 ENCOUNTER FOR SCREENING FOR MALIGNANT NEOPLASM OF PROSTATE: ICD-10-CM

## 2018-08-15 LAB
ANION GAP SERPL CALCULATED.3IONS-SCNC: 6 MMOL/L (ref 3–14)
B-HCG FREE SERPL-ACNC: 1.2 [IU]/L (ref 0.86–1.14)
BUN SERPL-MCNC: 12 MG/DL (ref 7–30)
CALCIUM SERPL-MCNC: 8.3 MG/DL (ref 8.5–10.1)
CHLORIDE SERPL-SCNC: 113 MMOL/L (ref 94–109)
CO2 SERPL-SCNC: 23 MMOL/L (ref 20–32)
CREAT SERPL-MCNC: 0.54 MG/DL (ref 0.66–1.25)
GFR SERPL CREATININE-BSD FRML MDRD: >90 ML/MIN/1.7M2
GLUCOSE SERPL-MCNC: 125 MG/DL (ref 70–99)
INR PPP: 1.52 (ref 0.86–1.14)
NT-PROBNP SERPL-MCNC: 649 PG/ML (ref 0–125)
POTASSIUM SERPL-SCNC: 4.4 MMOL/L (ref 3.4–5.3)
PSA SERPL-ACNC: 3.27 UG/L (ref 0–4)
SODIUM SERPL-SCNC: 141 MMOL/L (ref 133–144)

## 2018-08-15 PROCEDURE — 85610 PROTHROMBIN TIME: CPT | Performed by: INTERNAL MEDICINE

## 2018-08-15 PROCEDURE — G0103 PSA SCREENING: HCPCS | Performed by: INTERNAL MEDICINE

## 2018-08-15 PROCEDURE — 93451 RIGHT HEART CATH: CPT

## 2018-08-15 PROCEDURE — T1013 SIGN LANG/ORAL INTERPRETER: HCPCS | Mod: U3

## 2018-08-15 PROCEDURE — 85610 PROTHROMBIN TIME: CPT

## 2018-08-15 PROCEDURE — 27210787 ZZH MANIFOLD CR2

## 2018-08-15 PROCEDURE — 40000166 ZZH STATISTIC PP CARE STAGE 1

## 2018-08-15 PROCEDURE — 27211181 ZZH BALLOON TIP PRESSURE CR5

## 2018-08-15 PROCEDURE — 80048 BASIC METABOLIC PNL TOTAL CA: CPT | Performed by: INTERNAL MEDICINE

## 2018-08-15 PROCEDURE — 25000125 ZZHC RX 250: Performed by: INTERNAL MEDICINE

## 2018-08-15 PROCEDURE — 93451 RIGHT HEART CATH: CPT | Mod: 26 | Performed by: INTERNAL MEDICINE

## 2018-08-15 PROCEDURE — 83880 ASSAY OF NATRIURETIC PEPTIDE: CPT | Performed by: INTERNAL MEDICINE

## 2018-08-15 PROCEDURE — 27210982 ZZH KIT RT HC TOTES DISP CR7

## 2018-08-15 RX ORDER — LIDOCAINE 40 MG/G
CREAM TOPICAL
Status: COMPLETED | OUTPATIENT
Start: 2018-08-15 | End: 2018-08-15

## 2018-08-15 RX ADMIN — LIDOCAINE: 40 CREAM TOPICAL at 08:52

## 2018-08-15 NOTE — IP AVS SNAPSHOT
MRN:[id number]                      After Visit Summary   8/15/2018    Loy Limon    MRN: [id number]           Visit Information        Department      8/15/2018  7:57 AM [address] [facility]          Review of your medicines      UNREVIEWED medicines. Ask your doctor about these medicines        Dose / Directions    alfuzosin 10 MG 24 hr tablet   Commonly known as:  UROXATRAL   Used for:  Alcoholic cirrhosis of liver without ascites (H), HCC (hepatocellular carcinoma) (H)        Dose:  10 mg   Take 10 mg by mouth daily   Refills:  0       * furosemide 20 MG tablet   Commonly known as:  LASIX   Used for:  Cirrhosis of liver (H)        TAKE ONE-HALF TABLET BY MOUTH DAILY   Quantity:  15 tablet   Refills:  0       * furosemide 20 MG tablet   Commonly known as:  LASIX   Used for:  Alcoholic cirrhosis of liver without ascites (H), HCC (hepatocellular carcinoma) (H), Edema, unspecified type        Dose:  20 mg   Take 1 tablet (20 mg) by mouth daily   Quantity:  60 tablet   Refills:  3       HYDROmorphone 2 MG tablet   Commonly known as:  DILAUDID        Refills:  0       NADOLOL PO        Dose:  20 mg   Take 20 mg by mouth daily   Refills:  0       omeprazole 20 MG CR capsule   Commonly known as:  priLOSEC        Dose:  20 mg   Take 20 mg by mouth   Refills:  0       * spironolactone 25 MG tablet   Commonly known as:  ALDACTONE   Used for:  Cirrhosis of liver (H)        Dose:  25 mg   Take 1 tablet (25 mg) by mouth daily   Quantity:  30 tablet   Refills:  0       * spironolactone 25 MG tablet   Commonly known as:  ALDACTONE   Used for:  Alcoholic cirrhosis of liver without ascites (H), HCC (hepatocellular carcinoma) (H), Edema, unspecified type        Dose:  50 mg   Take 2 tablets (50 mg) by mouth daily   Quantity:  60 tablet   Refills:  3       XIFAXAN 550 MG Tabs tablet   Generic drug:  rifaximin        Refills:  0       * Notice:  This list has 4 medication(s) that are the same as other  medications prescribed for you. Read the directions carefully, and ask your doctor or other care provider to review them with you.             Protect others around you: Learn how to safely use, store and throw away your medicines at www.disposemymeds.org.         Follow-ups after your visit        Your next 10 appointments already scheduled     Aug 15, 2018  9:30 AM CDT   Heart Cath Right Heart Cath with [id number]   [facility], [place],  Heart Cath Lab ([facility])    [address] [address] [phone]            Aug 21, 2018 10:15 AM CDT   Lab with  LAB   [facility] ([facility])    [address]  1st Floor  [facility] [address] [phone]            Aug 21, 2018 10:30 AM CDT   (Arrive by 10:15 AM)   RETURN PRIMARY PULMONARY with Ayaan Coughlin MD   [facility] ([facility])    [address]  [address]  [facility] [address] [phone]            Aug 21, 2018  3:00 PM CDT   (Arrive by 2:45 PM)   New Patient Visit with Pauline Clancy MD   [facility] ([facility])    [address]  [address]  [facility] [address] [phone]            Aug 24, 2018 10:45 AM CDT   (Arrive by 10:30 AM)   Return Visit with Khloe Torres MD   [facility]rologic C[facility] ([facility])    [address]  4th Floor  [facility] [address] [phone]            Oct 03, 2018  8:30 AM CDT   MR ABDOMEN W/O & W CONTRAST with UCMR1   [facility] [place] [facility] ([facility])    [address]  1st Floor  [facility] [address] [phone]           Take your medicines as usual, unless your doctor tells you not to. Bring a list of your current medicines to your exam (including vitamins, minerals and  over-the-counter drugs). Also bring the results of similar scans you may have had.    You may or may not receive IV contrast for this exam pending the discretion of the Radiologist.   Do not eat or drink for 6 hours prior to exam.  The MRI machine uses a strong magnet. Please wear clothes without metal (snaps, zippers). A sweatsuit works well, or we may give you a hospital gown.  Please remove any body piercings and hair extensions before you arrive. You will also remove watches, jewelry, hairpins, wallets, dentures, partial dental plates and hearing aids. You may wear contact lenses, and you may be able to wear your rings. We have a safe place to keep your personal items, but it is safer to leave them at home.  **IMPORTANT** THE INSTRUCTIONS BELOW ARE ONLY FOR THOSE PATIENTS WHO HAVE BEEN PRESCRIBED SEDATION OR GENERAL ANESTHESIA DURING THEIR MRI PROCEDURE:  IF YOUR DOCTOR PRESCRIBED ORAL SEDATION (take medicine to help you relax during your exam):   You must get the medicine from your doctor (oral medication) before you arrive. Bring the medicine to the exam. Do not take it at home. You ll be told when to take it upon arriving for your exam.   Arrive one hour early. Bring someone who can take you home after the test. Your medicine will make you sleepy. After the exam, you may not drive, take a bus or take a taxi by yourself.  IF YOUR DOCTOR PRESCRIBED IV SEDATION:   Arrive one hour early. Bring someone who can take you home after the test. Your medicine will make you sleepy. After the exam, you may not drive, take a bus or take a taxi by yourself.   No eating 6 hours before your exam. You may have clear liquids up until 4 hours before your exam. (Clear liquids include water, clear tea, black coffee and fruit juice without pulp.)  IF YOUR DOCTOR PRESCRIBED ANESTHESIA (be asleep for your exam):   Arrive 1 1/2 hours early. Bring someone who can take you home after the test. You may not drive, take a bus or take a  taxi by yourself.   No eating 8 hours before your exam. You may have clear liquids up until 4 hours before your exam. (Clear liquids include water, clear tea, black coffee and fruit juice without pulp.)   You will spend four to five hours in the recovery room.  If you have any questions, please contact your Imaging Department exam site.            Oct 03, 2018  9:20 AM CDT   CT CHEST W/O CONTRAST with UCCT1   [facility] CT ([facility])    [address] [place] [id number] [phone]           Please bring any scans or X-rays taken at other hospitals, if similar tests were done. Also bring a list of your medicines, including vitamins, minerals and over-the-counter drugs. It is safest to leave personal items at home.  Be sure to tell your doctor:   If you have any allergies.   If there s any chance you are pregnant.   If you are breastfeeding.  You do not need to do anything special to prepare for this exam.  Please wear loose clothing, such as a sweat suit or jogging clothes. Avoid snaps, zippers and other metal. We may ask you to undress and put on a hospital gown.            Oct 03, 2018  9:45 AM CDT   Lab with  LAB   [facility] ([facility])    [address] [place] [id number] [phone]            Oct 04, 2018  4:00 PM CDT   (Arrive by 3:45 PM)   Return Visit with Hi Melgar MD   [facility] ([facility])    [address] [place] [address]  [address]  [facility] [id number] [phone]            Oct 10, 2018  8:30 AM CDT   MR ABDOMEN W/O & W CONTRAST with [id number] [place] [address] MRI ([facility])    [address] [place] [id number] [phone]           Take your medicines as usual, unless your doctor tells you not to. Bring a list of your current medicines to your exam (including  vitamins, minerals and over-the-counter drugs). Also bring the results of similar scans you may have had.    You may or may not receive IV contrast for this exam pending the discretion of the Radiologist.   Do not eat or drink for 6 hours prior to exam.  The MRI machine uses a strong magnet. Please wear clothes without metal (snaps, zippers). A sweatsuit works well, or we may give you a hospital gown.  Please remove any body piercings and hair extensions before you arrive. You will also remove watches, jewelry, hairpins, wallets, dentures, partial dental plates and hearing aids. You may wear contact lenses, and you may be able to wear your rings. We have a safe place to keep your personal items, but it is safer to leave them at home.  **IMPORTANT** THE INSTRUCTIONS BELOW ARE ONLY FOR THOSE PATIENTS WHO HAVE BEEN PRESCRIBED SEDATION OR GENERAL ANESTHESIA DURING THEIR MRI PROCEDURE:  IF YOUR DOCTOR PRESCRIBED ORAL SEDATION (take medicine to help you relax during your exam):   You must get the medicine from your doctor (oral medication) before you arrive. Bring the medicine to the exam. Do not take it at home. You ll be told when to take it upon arriving for your exam.   Arrive one hour early. Bring someone who can take you home after the test. Your medicine will make you sleepy. After the exam, you may not drive, take a bus or take a taxi by yourself.  IF YOUR DOCTOR PRESCRIBED IV SEDATION:   Arrive one hour early. Bring someone who can take you home after the test. Your medicine will make you sleepy. After the exam, you may not drive, take a bus or take a taxi by yourself.   No eating 6 hours before your exam. You may have clear liquids up until 4 hours before your exam. (Clear liquids include water, clear tea, black coffee and fruit juice without pulp.)  IF YOUR DOCTOR PRESCRIBED ANESTHESIA (be asleep for your exam):   Arrive 1 1/2 hours early. Bring someone who can take you home after the test. You may not drive,  take a bus or take a taxi by yourself.   No eating 8 hours before your exam. You may have clear liquids up until 4 hours before your exam. (Clear liquids include water, clear tea, black coffee and fruit juice without pulp.)   You will spend four to five hours in the recovery room.  If you have any questions, please contact your Imaging Department exam site.               Care Instructions        Further instructions from your care team       [facility]                        Interventional Cardiology  Discharge Instructions   Post Right Heart Cath      AFTER YOU GO HOME:    DO drink plenty of fluids    DO resume your regular diet and medications unless otherwise instructed by your Primary Physician    Do Not scrub the procedure site vigorously    No lotion or powder to the puncture site for 3 days    CALL YOUR PRIMARY PHYSICIAN IF: You may resume all normal activity.  Monitor neck site for bleeding, swelling, or voice changes. If you notice bleeding or swelling immediately apply pressure to the site and call number below to speak with Cardiology Fellow.  If you experience any changes in your breathing you should call your doctor immediately or come to the closest Emergency Department.  Do not drive yourself.    ADDITIONAL INSTRUCTIONS: Medications: You are to resume all home medications including anticoagulation therapy unless otherwise advised by your primary cardiologist or nurse coordinator.    Follow Up: Per your primary cardiology team    If you have any questions or concerns regarding your procedure site please call [phone] at anytime and ask for Cardiology Fellow on call.  They are available 24 hours a day.  You may also contact the Cardiology Clinic after hours number at [phone].                                                       Telephone Numbers [phone] Monday-Friday 8:00 am to 4:30 pm    [phone] After 4:30 pm Monday-Friday, Weekends & Holidays  Ask  "for Interventional Cardiologist on call. Someone is on call 24 hours/day   [facility] toll free number [phone] Monday-Friday 8:00 am to 4:30 pm   [facility] Emergency Dept [phone]                    Additional Information About Your Visit        Care EveryWhere ID     This is your Care EveryWhere ID. This could be used by other organizations to access your [facility] medical records  [id number]        Your Vitals Were     Blood Pressure Temperature Respirations Height Weight Pulse Oximetry    135/71 97.5  F (36.4  C) (Oral) 16 1.702 m (5' 7\") 81.6 kg (180 lb) 99%    BMI (Body Mass Index)                   28.19 kg/m2            Primary Care Provider    [facility], MD      Equal Access to Services     DAVID COWAN : Hadii aad ku hadasho Sodouglasali, waaxda luqadaha, qaybta kaalmada adeegyada, javier mckay. So [facility] [phone].    ATENCIÓN: Si habla español, tiene a samaniego disposición servicios gratuitos de asistencia lingüística. Llame al [phone].    We comply with applicable federal civil rights laws and [place] laws. We do not discriminate on the basis of race, color, national origin, age, disability, sex, sexual orientation, or gender identity.            Thank you!     Thank you for choosing [facility] for your care. Our goal is always to provide you with excellent care. Hearing back from our patients is one way we can continue to improve our services. Please take a few minutes to complete the written survey that you may receive in the mail after you visit with us. Thank you!             Medication List: This is a list of all your medications and when to take them. Check marks below indicate your daily home schedule. Keep this list as a reference.      Medications           Morning Afternoon Evening Bedtime As Needed    alfuzosin 10 MG 24 hr tablet   Commonly known as:  UROXATRAL   Take 10 mg by mouth daily                                * furosemide 20 MG " tablet   Commonly known as:  LASIX   TAKE ONE-HALF TABLET BY MOUTH DAILY                                * furosemide 20 MG tablet   Commonly known as:  LASIX   Take 1 tablet (20 mg) by mouth daily                                HYDROmorphone 2 MG tablet   Commonly known as:  DILAUDID                                NADOLOL PO   Take 20 mg by mouth daily                                omeprazole 20 MG CR capsule   Commonly known as:  priLOSEC   Take 20 mg by mouth                                * spironolactone 25 MG tablet   Commonly known as:  ALDACTONE   Take 1 tablet (25 mg) by mouth daily                                * spironolactone 25 MG tablet   Commonly known as:  ALDACTONE   Take 2 tablets (50 mg) by mouth daily                                XIFAXAN 550 MG Tabs tablet   Generic drug:  rifaximin                                * Notice:  This list has 4 medication(s) that are the same as other medications prescribed for you. Read the directions carefully, and ask your doctor or other care provider to review them with you.

## 2018-08-15 NOTE — PROGRESS NOTES
0938--Pt returned from procedure, right heart ; site on right neck is soft, flat and dry.  Pt awake and alert; Pt denies pain. Wife at bedside. VSS. Taking fluids without problem.   Discharge instructions reviewed with pt; pt stated understanding; copy given to pt; all cares and instructions reviewed with . Pt dc to home with family.

## 2018-08-15 NOTE — PROCEDURES
CARDIAC CATH REPORT:     PROCEDURES PERFORMED:   Right Heart Catheterization    PHYSICIANS:  Attending Physician: Tomy Wells M.D.  Interventional Cardiology Fellow: None  Cardiology Fellow: Estuardo Marvin M.D.    INDICATION:  Loy Limon is a 63 year old male w/ liver cirrhosis here for evaluation for possible pulmonary hypertnesion  The indication for the procedure was liver cirrhosis      DESCRIPTION:  1. Consent obtained with discussion of risks.  All questions were answered.  2. Sterile prep and procedure.  3. Location with Sheaths:   RTIJ 7Fr short  4. Access: Local anesthetic with lidocaine.  A standard 18 guage needle with ultrasound guidance was used to establish vascular access using a modified Seldinger technique.  5. Diagnostic Catheters:   7 Fr  [place]  6. Estimated blood loss: < 5 ml    MEDICATIONS:  Heart rate, BP, respiration, oxygen saturation and patient responses were monitored throughout the procedure with the assistance of the RN under my supervision.    Procedures:  HEMODYNAMICS:  BSA 1.702 m^2  1. HR 50 bpm  2. /65/89 mmHg  3. RA 9/12/9   4. RV 34/9  5. PA 34/14/21   6. PCW 14   7. PA sat 70.5%     9. Hgb 10.3 g/dL   10. Fausto CO 5.5  11. Fausto CI 2.8   12. TD CO 5.4   13. TD CI 2.8   14. PVR 1.3  15. TPR 3.9     Sheath Removal:  The catheter sheath was manually removed in the cardiac catheterization laboratory.    Contrast: Isovue, 0 ml     Fluoroscopy Time: 0.1 min    COMPLICATIONS:  1. None    SUMMARY:   >> Normal right sided filling pressures.  >> [place] left sided filling pressures.  >> Normal PA pressures   >> Normal cardiac output, 5.4 L/min with index 2.8 L/min/m2    PLAN:   >> Bedrest per protocol.  >> Continued medical management and lifestyle modification for cardiovascular risk factor optimization.   >>. Discharge today per protocol    The attending cardiologist was present and supervised all critical aspects the procedure.    See CVIS report for final draft.    Estuardo  CHINO Marvin.  Cardiology Fellow    I was present in the cath lab for the procedure and agree with the above assessment and plan.  Tomy Wells M.D.   Cardiology Staff

## 2018-08-15 NOTE — PROGRESS NOTES
Prep and teaching complete for right heart cath; Wife, Drea at bedside, will wait in room.  present for all cares. Ready, to cath lab. Pt reports no meds today.

## 2018-08-15 NOTE — DISCHARGE INSTRUCTIONS
[facility]                        Interventional Cardiology  Discharge Instructions   Post Right Heart Cath      AFTER YOU GO HOME:    DO drink plenty of fluids    DO resume your regular diet and medications unless otherwise instructed by your Primary Physician    Do Not scrub the procedure site vigorously    No lotion or powder to the puncture site for 3 days    CALL YOUR PRIMARY PHYSICIAN IF: You may resume all normal activity.  Monitor neck site for bleeding, swelling, or voice changes. If you notice bleeding or swelling immediately apply pressure to the site and call number below to speak with Cardiology Fellow.  If you experience any changes in your breathing you should call your doctor immediately or come to the closest Emergency Department.  Do not drive yourself.    ADDITIONAL INSTRUCTIONS: Medications: You are to resume all home medications including anticoagulation therapy unless otherwise advised by your primary cardiologist or nurse coordinator.    Follow Up: Per your primary cardiology team    If you have any questions or concerns regarding your procedure site please call [phone] at anytime and ask for Cardiology Fellow on call.  They are available 24 hours a day.  You may also contact the Cardiology Clinic after hours number at [phone].                                                       Telephone Numbers [phone] Monday-Friday 8:00 am to 4:30 pm    [phone] After 4:30 pm Monday-Friday, Weekends & Holidays  Ask for Interventional Cardiologist on call. Someone is on call 24 hours/day   [facility] toll free number [phone] Monday-Friday 8:00 am to 4:30 pm   [facility] Emergency Dept [phone]

## 2018-08-15 NOTE — PROGRESS NOTES
D: Pt arrived in cath lab for Right Heart Catheterization  I: Right heart catheterization completed per MD.  7fr sheath removed from rijv site, manual pressure applied until hemostasis achieved.  A: right neck site clean, dry and intact. Soft, no hematoma.  P: Pt to transfer to  for discharge.  Pt educated on watching neck site for bleeding, hematoma, pressure on airway and voice changes.

## 2018-08-15 NOTE — IP AVS SNAPSHOT
[address] [address]                                       After Visit Summary   8/15/2018    Loy Limon    MRN: [id number]           After Visit Summary Signature Page     I have received my discharge instructions, and my questions have been answered. I have discussed any challenges I see with this plan with the nurse or doctor.    ..........................................................................................................................................  Patient/Patient Representative Signature      ..........................................................................................................................................  Patient Representative Print Name and Relationship to Patient    ..................................................               ................................................  Date                                            Time    ..........................................................................................................................................  Reviewed by Signature/Title    ...................................................              ..............................................  Date                                                            Time

## 2018-08-17 DIAGNOSIS — C22.0 LIVER CELL CARCINOMA (H): ICD-10-CM

## 2018-08-17 DIAGNOSIS — R94.5 ABNORMAL RESULTS OF LIVER FUNCTION STUDIES: Primary | ICD-10-CM

## 2018-08-20 ENCOUNTER — TELEPHONE (OUTPATIENT)
Dept: INFECTIOUS DISEASES | Facility: CLINIC | Age: 63
End: 2018-08-20

## 2018-08-20 ENCOUNTER — TEAM CONFERENCE (OUTPATIENT)
Dept: TRANSPLANT | Facility: CLINIC | Age: 63
End: 2018-08-20

## 2018-08-20 NOTE — TELEPHONE ENCOUNTER
Transplant Social Work Services Phone Call      Data: Loy is being evaluated for a Liver Transplant.  He was recommended to complete a chemical dependency evaluation, and his daughter previously reported it was scheduled at [facility] for July 25, 2018.   Intervention: I attempted to reach patient's daughter Kaylene but was unable.  I left a detailed voice message for her inquiring about the status of her father's chemical dependency evaluation, and if it was completed as scheduled I recommended she request a copy be faxed to me.  Assessment: Further assessment is needed.  Plan: Awaiting a call back from patient's daughter.      ABRAHAM Gaming, [facility]  Liver Transplant   Phone [phone]  Pager [phone]

## 2018-08-21 ENCOUNTER — OFFICE VISIT (OUTPATIENT)
Dept: CARDIOLOGY | Facility: CLINIC | Age: 63
End: 2018-08-21
Attending: INTERNAL MEDICINE
Payer: MEDICARE

## 2018-08-21 ENCOUNTER — OFFICE VISIT (OUTPATIENT)
Dept: INFECTIOUS DISEASES | Facility: CLINIC | Age: 63
End: 2018-08-21
Attending: STUDENT IN AN ORGANIZED HEALTH CARE EDUCATION/TRAINING PROGRAM
Payer: MEDICARE

## 2018-08-21 VITALS
TEMPERATURE: 97.6 F | SYSTOLIC BLOOD PRESSURE: 154 MMHG | HEIGHT: 67 IN | OXYGEN SATURATION: 100 % | HEART RATE: 54 BPM | DIASTOLIC BLOOD PRESSURE: 81 MMHG | WEIGHT: 179.8 LBS | BODY MASS INDEX: 28.22 KG/M2

## 2018-08-21 VITALS
DIASTOLIC BLOOD PRESSURE: 73 MMHG | OXYGEN SATURATION: 99 % | SYSTOLIC BLOOD PRESSURE: 138 MMHG | HEIGHT: 67 IN | BODY MASS INDEX: 28.25 KG/M2 | WEIGHT: 180 LBS | HEART RATE: 51 BPM

## 2018-08-21 DIAGNOSIS — Z22.7 LATENT TUBERCULOSIS INFECTION: Primary | ICD-10-CM

## 2018-08-21 DIAGNOSIS — K70.30 ALCOHOLIC CIRRHOSIS OF LIVER WITHOUT ASCITES (H): ICD-10-CM

## 2018-08-21 DIAGNOSIS — R06.00 DYSPNEA, UNSPECIFIED TYPE: Primary | ICD-10-CM

## 2018-08-21 DIAGNOSIS — Z76.82 ORGAN TRANSPLANT CANDIDATE: ICD-10-CM

## 2018-08-21 DIAGNOSIS — C22.0 HCC (HEPATOCELLULAR CARCINOMA) (H): ICD-10-CM

## 2018-08-21 DIAGNOSIS — Z23 NEED FOR VACCINATION: ICD-10-CM

## 2018-08-21 DIAGNOSIS — R60.0 LOCALIZED EDEMA: ICD-10-CM

## 2018-08-21 LAB
AFP SERPL-MCNC: 4.5 UG/L (ref 0–8)
ALBUMIN SERPL-MCNC: 2.7 G/DL (ref 3.4–5)
ALP SERPL-CCNC: 332 U/L (ref 40–150)
ALT SERPL W P-5'-P-CCNC: 52 U/L (ref 0–70)
ANION GAP SERPL CALCULATED.3IONS-SCNC: 6 MMOL/L (ref 3–14)
AST SERPL W P-5'-P-CCNC: 63 U/L (ref 0–45)
BILIRUB DIRECT SERPL-MCNC: 1.1 MG/DL (ref 0–0.2)
BILIRUB SERPL-MCNC: 2.4 MG/DL (ref 0.2–1.3)
BUN SERPL-MCNC: 12 MG/DL (ref 7–30)
CALCIUM SERPL-MCNC: 8.6 MG/DL (ref 8.5–10.1)
CHLORIDE SERPL-SCNC: 107 MMOL/L (ref 94–109)
CO2 SERPL-SCNC: 26 MMOL/L (ref 20–32)
CREAT SERPL-MCNC: 0.62 MG/DL (ref 0.66–1.25)
ERYTHROCYTE [DISTWIDTH] IN BLOOD BY AUTOMATED COUNT: 13.6 % (ref 10–15)
GFR SERPL CREATININE-BSD FRML MDRD: >90 ML/MIN/1.7M2
GLUCOSE SERPL-MCNC: 139 MG/DL (ref 70–99)
HCT VFR BLD AUTO: 34.7 % (ref 40–53)
HGB BLD-MCNC: 11.9 G/DL (ref 13.3–17.7)
MCH RBC QN AUTO: 33.7 PG (ref 26.5–33)
MCHC RBC AUTO-ENTMCNC: 34.3 G/DL (ref 31.5–36.5)
MCV RBC AUTO: 98 FL (ref 78–100)
PLATELET # BLD AUTO: 76 10E9/L (ref 150–450)
POTASSIUM SERPL-SCNC: 4.4 MMOL/L (ref 3.4–5.3)
PROT SERPL-MCNC: 7.2 G/DL (ref 6.8–8.8)
RBC # BLD AUTO: 3.53 10E12/L (ref 4.4–5.9)
SODIUM SERPL-SCNC: 139 MMOL/L (ref 133–144)
WBC # BLD AUTO: 2.7 10E9/L (ref 4–11)

## 2018-08-21 PROCEDURE — 25000125 ZZHC RX 250: Mod: ZF | Performed by: STUDENT IN AN ORGANIZED HEALTH CARE EDUCATION/TRAINING PROGRAM

## 2018-08-21 PROCEDURE — G0463 HOSPITAL OUTPT CLINIC VISIT: HCPCS | Mod: 25,ZF

## 2018-08-21 PROCEDURE — 85027 COMPLETE CBC AUTOMATED: CPT | Performed by: INTERNAL MEDICINE

## 2018-08-21 PROCEDURE — 82105 ALPHA-FETOPROTEIN SERUM: CPT | Performed by: INTERNAL MEDICINE

## 2018-08-21 PROCEDURE — 25000128 H RX IP 250 OP 636: Mod: ZF | Performed by: STUDENT IN AN ORGANIZED HEALTH CARE EDUCATION/TRAINING PROGRAM

## 2018-08-21 PROCEDURE — 99214 OFFICE O/P EST MOD 30 MIN: CPT | Mod: ZP | Performed by: INTERNAL MEDICINE

## 2018-08-21 PROCEDURE — 80048 BASIC METABOLIC PNL TOTAL CA: CPT | Performed by: INTERNAL MEDICINE

## 2018-08-21 PROCEDURE — 90750 HZV VACC RECOMBINANT IM: CPT | Mod: ZF | Performed by: STUDENT IN AN ORGANIZED HEALTH CARE EDUCATION/TRAINING PROGRAM

## 2018-08-21 PROCEDURE — G0009 ADMIN PNEUMOCOCCAL VACCINE: HCPCS | Mod: ZF

## 2018-08-21 PROCEDURE — 90715 TDAP VACCINE 7 YRS/> IM: CPT | Mod: ZF | Performed by: STUDENT IN AN ORGANIZED HEALTH CARE EDUCATION/TRAINING PROGRAM

## 2018-08-21 PROCEDURE — T1013 SIGN LANG/ORAL INTERPRETER: HCPCS | Mod: U3,ZF

## 2018-08-21 PROCEDURE — 80321 ALCOHOLS BIOMARKERS 1OR 2: CPT | Performed by: INTERNAL MEDICINE

## 2018-08-21 PROCEDURE — G0463 HOSPITAL OUTPT CLINIC VISIT: HCPCS | Mod: 25,ZF,27

## 2018-08-21 PROCEDURE — T1013 SIGN LANG/ORAL INTERPRETER: HCPCS | Mod: U3

## 2018-08-21 PROCEDURE — 90670 PCV13 VACCINE IM: CPT | Mod: ZF | Performed by: STUDENT IN AN ORGANIZED HEALTH CARE EDUCATION/TRAINING PROGRAM

## 2018-08-21 PROCEDURE — 36415 COLL VENOUS BLD VENIPUNCTURE: CPT | Performed by: INTERNAL MEDICINE

## 2018-08-21 PROCEDURE — 80076 HEPATIC FUNCTION PANEL: CPT | Performed by: INTERNAL MEDICINE

## 2018-08-21 PROCEDURE — 90472 IMMUNIZATION ADMIN EACH ADD: CPT | Mod: ZF

## 2018-08-21 RX ORDER — RIFAMPIN 300 MG/1
600 CAPSULE ORAL DAILY
Qty: 60 CAPSULE | Refills: 3 | Status: ON HOLD | OUTPATIENT
Start: 2018-08-21 | End: 2019-01-07

## 2018-08-21 RX ADMIN — PNEUMOCOCCAL 13-VALENT CONJUGATE VACCINE 0.5 ML: 2.2; 2.2; 2.2; 2.2; 2.2; 4.4; 2.2; 2.2; 2.2; 2.2; 2.2; 2.2; 2.2 INJECTION, SUSPENSION INTRAMUSCULAR at 16:19

## 2018-08-21 RX ADMIN — TETANUS TOXOID, REDUCED DIPHTHERIA TOXOID AND ACELLULAR PERTUSSIS VACCINE, ADSORBED 0.5 ML: 5; 2.5; 8; 8; 2.5 SUSPENSION INTRAMUSCULAR at 16:23

## 2018-08-21 RX ADMIN — ZOSTER VACCINE RECOMBINANT, ADJUVANTED 0.5 ML: KIT at 16:25

## 2018-08-21 ASSESSMENT — ENCOUNTER SYMPTOMS
SMELL DISTURBANCE: 0
BACK PAIN: 0
NECK MASS: 0
TASTE DISTURBANCE: 0
STIFFNESS: 0
MUSCLE CRAMPS: 0
MUSCLE WEAKNESS: 0
PALPITATIONS: 0
HYPERTENSION: 0
SORE THROAT: 0
NECK PAIN: 0
LEG PAIN: 0
TROUBLE SWALLOWING: 0
ARTHRALGIAS: 0
BRUISES/BLEEDS EASILY: 0
SINUS PAIN: 0
LIGHT-HEADEDNESS: 1
JOINT SWELLING: 0
HYPOTENSION: 0
MYALGIAS: 1
SYNCOPE: 0
ORTHOPNEA: 0
SINUS CONGESTION: 0
SLEEP DISTURBANCES DUE TO BREATHING: 0
HOARSE VOICE: 1
SWOLLEN GLANDS: 0
EXERCISE INTOLERANCE: 0

## 2018-08-21 ASSESSMENT — PAIN SCALES - GENERAL
PAINLEVEL: NO PAIN (0)
PAINLEVEL: NO PAIN (0)

## 2018-08-21 NOTE — MR AVS SNAPSHOT
After Visit Summary   8/21/2018    Loy Limon    MRN: [id number]           Patient Information     Date Of Birth          1955        Visit Information        Provider Department      8/21/2018 10:15 AM Tremayne Jackson; Ayaan Coughlin MD [facility]        Today's Diagnoses     Dyspnea, unspecified type    -  1    Localized edema           Follow-ups after your visit        Your next 10 appointments already scheduled     Aug 24, 2018 10:30 AM CDT   Return Visit with Khole Torres MD   [facility] Urology and Inst for Prostate and Urologic Cancers ([facility])    [address]  4th Floor  [place] [address]   [phone]            Sep 18, 2018  2:00 PM CDT   (Arrive by 1:45 PM)   Return Visit with Pauline Clancy MD   [facility] and Infectious Diseases ([facility])    [address]  [address]  [place] [address]   [phone]            Oct 03, 2018  8:30 AM CDT   MR ABDOMEN W/O & W CONTRAST with [address] MRI ([facility])    [address]  1st Floor  [place] [address]   [phone]           Take your medicines as usual, unless your doctor tells you not to. Bring a list of your current medicines to your exam (including vitamins, minerals and over-the-counter drugs). Also bring the results of similar scans you may have had.    You may or may not receive IV contrast for this exam pending the discretion of the Radiologist.   Do not eat or drink for 6 hours prior to exam.  The MRI machine uses a strong magnet. Please wear clothes without metal (snaps, zippers). A sweatsuit works well, or we may give you a hospital gown.  Please remove any body piercings and hair extensions before you arrive. You will also remove watches, jewelry, hairpins, wallets, dentures, partial dental plates and hearing aids. You may  wear contact lenses, and you may be able to wear your rings. We have a safe place to keep your personal items, but it is safer to leave them at home.  **IMPORTANT** THE INSTRUCTIONS BELOW ARE ONLY FOR THOSE PATIENTS WHO HAVE BEEN PRESCRIBED SEDATION OR GENERAL ANESTHESIA DURING THEIR MRI PROCEDURE:  IF YOUR DOCTOR PRESCRIBED ORAL SEDATION (take medicine to help you relax during your exam):   You must get the medicine from your doctor (oral medication) before you arrive. Bring the medicine to the exam. Do not take it at home. You ll be told when to take it upon arriving for your exam.   Arrive one hour early. Bring someone who can take you home after the test. Your medicine will make you sleepy. After the exam, you may not drive, take a bus or take a taxi by yourself.  IF YOUR DOCTOR PRESCRIBED IV SEDATION:   Arrive one hour early. Bring someone who can take you home after the test. Your medicine will make you sleepy. After the exam, you may not drive, take a bus or take a taxi by yourself.   No eating 6 hours before your exam. You may have clear liquids up until 4 hours before your exam. (Clear liquids include water, clear tea, black coffee and fruit juice without pulp.)  IF YOUR DOCTOR PRESCRIBED ANESTHESIA (be asleep for your exam):   Arrive 1 1/2 hours early. Bring someone who can take you home after the test. You may not drive, take a bus or take a taxi by yourself.   No eating 8 hours before your exam. You may have clear liquids up until 4 hours before your exam. (Clear liquids include water, clear tea, black coffee and fruit juice without pulp.)   You will spend four to five hours in the recovery room.  If you have any questions, please contact your Imaging Department exam site.            Oct 03, 2018  9:20 AM CDT   CT CHEST W/O CONTRAST with UCCT1   [facility] ([facility])    [address]  1st Floor  [place] [address] [phone]           Please  bring any scans or X-rays taken at other hospitals, if similar tests were done. Also bring a list of your medicines, including vitamins, minerals and over-the-counter drugs. It is safest to leave personal items at home.  Be sure to tell your doctor:   If you have any allergies.   If there s any chance you are pregnant.   If you are breastfeeding.  You do not need to do anything special to prepare for this exam.  Please wear loose clothing, such as a sweat suit or jogging clothes. Avoid snaps, zippers and other metal. We may ask you to undress and put on a hospital gown.            Oct 03, 2018  9:45 AM CDT   Lab with  LAB   [facility] Lab ([facility])    [address]  1st Floor  [place] [id number] [phone]            Oct 04, 2018  4:00 PM CDT   (Arrive by 3:45 PM)   Return Visit with Hi Melgar MD   [facility] ([facility])    [address]  [address]  [place] [id number] [phone]            Oct 12, 2018  9:00 AM CDT   (Arrive by 8:45 AM)   Return Visit with Debora Ac MD   [facility] ([facility])    [address]  [address]  [place] [id number] [phone]            Nov 14, 2018  8:00 AM CST   Lab with MICHELE LAB   [facility] Lab ([facility])    [address]  1st Floor  [place] [address] [phone]            Nov 14, 2018  9:00 AM CST   (Arrive by 8:45 AM)   Return General Liver with Soledad Skelton PA-C   [facility] Hepatology ([facility])    [address] [place] [address]  [address]  [facility] [id number] [phone]              Who to contact     If you have questions or need follow up information about today's clinic visit or your schedule please contact [facility] HEART CARE directly at [phone].  Normal or non-critical lab and imaging results will be communicated to  "you by MyChart, letter or phone within 4 business days after the clinic has received the results. If you do not hear from us within 7 days, please contact the clinic through MyChart or phone. If you have a critical or abnormal lab result, we will notify you by phone as soon as possible.  Submit refill requests through [employer] or call your pharmacy and they will forward the refill request to us. Please allow 3 business days for your refill to be completed.          Additional Information About Your Visit        Care EveryWhere ID     This is your Care EveryWhere ID. This could be used by other organizations to access your [place] medical records  [id number]        Your Vitals Were     Pulse Height Pulse Oximetry BMI (Body Mass Index)          51 1.702 m (5' 7\") 99% 28.19 kg/m2         Blood Pressure from Last 3 Encounters:   08/21/18 154/81   08/21/18 138/73   08/15/18 140/63    Weight from Last 3 Encounters:   08/21/18 81.6 kg (179 lb 12.8 oz)   08/21/18 81.6 kg (180 lb)   08/15/18 81.6 kg (180 lb)              Today, you had the following     No orders found for display         Today's Medication Changes          These changes are accurate as of 8/21/18 11:59 PM.  If you have any questions, ask your nurse or doctor.               Start taking these medicines.        Dose/Directions    rifampin 300 MG capsule   Commonly known as:  RIFADIN   Used for:  Latent tuberculosis infection   Started by:  Pauline Clancy MD        Dose:  600 mg   Take 2 capsules (600 mg) by mouth daily   Quantity:  60 capsule   Refills:  3            Where to get your medicines      These medications were [place] [address]  [address], [facility] [address]     Phone:  [phone]     rifampin 300 MG capsule                Primary Care Provider    [facility]       [address] [place] [address]        Equal Access to Services     " [facility]: Hadii aad ku kristal Sodouglasali, waaxda luqadaha, qaybta kaalmada adeedd, javier vonin hayaahyacinth frankshama ortiz jacky . So [facility] [phone].    ATENCIÓN: Si habla español, tiene a samaniego disposición servicios gratuitos de asistencia lingüística. Llame al [phone].    We comply with applicable federal civil rights laws and [place] laws. We do not discriminate on the basis of race, color, national origin, age, disability, sex, sexual orientation, or gender identity.            Thank you!     Thank you for choosing [facility]  for your care. Our goal is always to provide you with excellent care. Hearing back from our patients is one way we can continue to improve our services. Please take a few minutes to complete the written survey that you may receive in the mail after your visit with us. Thank you!             Your Updated Medication List - Protect others around you: Learn how to safely use, store and throw away your medicines at www.disposemymeds.org.          This list is accurate as of 8/21/18 11:59 PM.  Always use your most recent med list.                   Brand Name Dispense Instructions for use Diagnosis    alfuzosin 10 MG 24 hr tablet    UROXATRAL     Take 10 mg by mouth daily    Alcoholic cirrhosis of liver without ascites (H), HCC (hepatocellular carcinoma) (H)       * furosemide 20 MG tablet    LASIX    15 tablet    TAKE ONE-HALF TABLET BY MOUTH DAILY    Cirrhosis of liver (H)       * furosemide 20 MG tablet    LASIX    60 tablet    Take 1 tablet (20 mg) by mouth daily    Alcoholic cirrhosis of liver without ascites (H), HCC (hepatocellular carcinoma) (H), Edema, unspecified type       NADOLOL PO      Take 20 mg by mouth daily        omeprazole 20 MG CR capsule    priLOSEC     Take 20 mg by mouth        rifampin 300 MG capsule    RIFADIN    60 capsule    Take 2 capsules (600 mg) by mouth daily    Latent tuberculosis infection       * spironolactone 25 MG tablet    ALDACTONE    30  tablet    Take 1 tablet (25 mg) by mouth daily    Cirrhosis of liver (H)       * spironolactone 25 MG tablet    ALDACTONE    60 tablet    Take 2 tablets (50 mg) by mouth daily    Alcoholic cirrhosis of liver without ascites (H), HCC (hepatocellular carcinoma) (H), Edema, unspecified type       XIFAXAN 550 MG Tabs tablet   Generic drug:  rifaximin           * Notice:  This list has 4 medication(s) that are the same as other medications prescribed for you. Read the directions carefully, and ask your doctor or other care provider to review them with you.

## 2018-08-21 NOTE — PROGRESS NOTES
[facility]  Transplant Infectious Disease Consultation note  Today's Date: 8/21/18    Recommendations:  - start rifampin 600 mg daily x 4 months   -Discussed to take on an empty stomach, side effects and warning symptoms of liver toxicity  -prevnar 13, tdap and shingrix vaccination today   -LFT next visit along with strongyloides antibody, toxoplasma ab and neurocysticercus ab   -Messaged the liver transplant team about the Work restriction letter and got a reply that they will take care of it. Thanks     RTC 1 month    Thank you for involving me in the care of this patient. Please do not hesitate to contact me with any questions.    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     Latent TB: with a positive quant and being from [place] where TB incidence is high and in the absence of symptoms or signs of active TB, would consider him to have latent TB. Recommend rifampin for 4 months. No major drug interactions but due to liver cirrhosis, will follow LFTS closely. Discussed symptoms of liver toxicity and side effects of rifampin and answered all questions about the concept of latent Tb that patient had a little difficulty in understanding.    - Serostatus: CMV R+, EBV R+  - Immunization status: not received vaccinations, prevnar, tdap and shingrix today. shingrix and pneumovax in 2 months  - Prophylaxis:none      Attestation: I have reviewed today's vital signs, medications, labs and imaging. I personally reviewed the imaging. Reviewed medical history, surgical history, and family history in Epic History tab. No updates needed to be made.  Face to face time 45 mins. >50% spent coordinating care and counseling the above.     Pauline Clancy  , [facility]  Pager -  [phone]    -------------------------------------------------------------------------------------------------------------------   Reason for consult / Chief complaint:   Consulted by Dr. 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 As part of that work up, quantiferon was done and found to be positive at a value of around 1 and was thus referred to me.     He was born and raised in [place]. Came to the  around 35 years ago, goes back to [place] every year. He does not remember being exposed to anybody with TB. He does not recall having PPD or quant before. He reports, his wifes brother had tuberculosis, but he not closely associated with him. He denies symptoms of active TB. Ct chest done 5/2018 does not show evidence of active pulmn TB.     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 He is requesting an extension of work restriction letter for hernia (for heavy load lifting) and 36 hour work limit per week. He does not remember who gave the letter before and needs it tomorrow to continue working.     Social Hx:  Social History   Substance Use Topics     Smoking status: Former Smoker     Packs/day: 0.03     Years: 20.00     Types: Cigarettes, Cigars     Start date: 8/14/1970     Quit date: 3/14/2018     Smokeless tobacco: Never Used      Comment: Quit smoking Feb 2018, one cigarette after dinner     Alcohol use No      Comment: Quit drinking Feb 2018       Immunizations:  Immunization History   Administered Date(s) Administered     Pneumo Conj 13-V (2010&after) 08/21/2018     TDAP Vaccine (Boostrix) 08/21/2018     Zoster vaccine recombinant adjuvanted (SHINGRIX) 08/21/2018       Allergies:   No Known Allergies    Medications:  Current  "Outpatient Prescriptions   Medication     alfuzosin (UROXATRAL) 10 MG 24 hr tablet     furosemide (LASIX) 20 MG tablet     NADOLOL PO     omeprazole (PRILOSEC) 20 MG CR capsule     rifampin (RIFADIN) 300 MG capsule     spironolactone (ALDACTONE) 25 MG tablet     XIFAXAN 550 MG TABS tablet     No current facility-administered medications for this visit.        Past Medical Hx:  Past Medical History:   Diagnosis Date     Cancer (H)     hepatocellualr carcinoma     Cirrhosis of liver (H) 5/8/2018     Enlarged prostate      Inguinal hernia      Liver lesion 5/8/2018         Family History:  Family History   Problem Relation Age of Onset     Liver Disease Brother          Review of Systems:  10 systems reviewed, pertinent positives noted in my HPI.      Examination:  Vital signs:   /81  Pulse 54  Temp 97.6  F (36.4  C) (Oral)  Ht 1.702 m (5' 7\")  Wt 81.6 kg (179 lb 12.8 oz)  SpO2 100%  BMI 28.16 kg/m2    Constitutional: Patient in no distress  Eyes: not pale, mildly jaundiced  Neck: no lymphadenopathy  CVS: no added sounds  RS: clear  Abdomen: soft, BS+  Skin: no rash  Extremities: mild pedal edema  Psych: Alert and oriented x 3    Laboratory:  Hematology:  Recent Labs   Lab Test  08/21/18   1050  07/19/18   1132  07/11/18   1302   WBC  2.7*  2.6*  3.4*   RBC  3.53*  3.41*  3.48*   HGB  11.9*  11.6*  11.8*   HCT  34.7*  33.6*  34.2*   MCV  98  99  98   MCH  33.7*  34.0*  33.9*   MCHC  34.3  34.5  34.5   RDW  13.6  14.5  13.9   PLT  76*  72*  81*       Chemistry:  Recent Labs   Lab Test  08/21/18   1050  08/15/18   0829  07/25/18   0848   NA  139  141  138   POTASSIUM  4.4  4.4  3.9   CHLORIDE  107  113*  107   CO2  26  23  23   ANIONGAP  6  6  8   GLC  139*  125*  130*   BUN  12  12  12   CR  0.62*  0.54*  0.48*   YELENA  8.6  8.3*  7.6*       Liver Function Studies:     Recent Labs   Lab Test  08/21/18   1050  07/25/18   0848  07/19/18   1132   PROTTOTAL  7.2  5.7*  6.9   ALBUMIN  2.7*  2.2*  2.5*   BILITOTAL  " 2.4*  2.1*  2.3*   ALKPHOS  332*  230*  344*   AST  63*  48*  65*   ALT  52  39  52       Microbiology:  18   Quant positive       Imagin2018   CT chest   Multifocal area of groundglass opacities of the bilateral  lower lobes, to lesser extent left upper lobes. Differentials  including focal atelectasis versus infection. Short-term follow-up to  make sure resolution is recommended.   1.  4 mm part solid pulmonary nodule within the right lower lobe  posterior medially, attention on follow-up is recommended.  3. Right hepatic lobe hypoattenuating lesion measures 3.8 x 3.0 cm.  Hypoattenuating lesion in the posterior right hepatic lobe measures  1.9 cm. Limited evaluation due to single phase study. Please refer to  same-day MRI study for further characterization.  4. Gallbladder wall thickening. Consider ultrasound.

## 2018-08-21 NOTE — MR AVS SNAPSHOT
After Visit Summary   8/21/2018    Loy Limon    MRN: [id number]           Patient Information     Date Of Birth          1955        Visit Information        Provider Department      8/21/2018 2:45 PM Tremayne Jackson; Pauline Clancy MD [facility] and Infectious Diseases        Today's Diagnoses     Latent tuberculosis infection    -  1    Cirrhosis of liver (H)        Need for vaccination           Follow-ups after your visit        Your next 10 appointments already scheduled     Aug 24, 2018 10:30 AM CDT   Return Visit with Khloe Torres MD   [facility]rology and [facility] for Prostate and Urologic Cancers ([facility])    [address]  4th Floor  [facility] [address] [phone]            Sep 18, 2018  2:00 PM CDT   (Arrive by 1:45 PM)   Return Visit with Pauline Clancy MD   [facility] and Infectious Diseases ([facility])    [address]  [address]  [facility] [id number] [phone]            Oct 03, 2018  8:30 AM CDT   MR ABDOMEN W/O & W CONTRAST with [address] MRI ([facility])    [address]  1st Floor  [facility] [address] [phone]           Take your medicines as usual, unless your doctor tells you not to. Bring a list of your current medicines to your exam (including vitamins, minerals and over-the-counter drugs). Also bring the results of similar scans you may have had.    You may or may not receive IV contrast for this exam pending the discretion of the Radiologist.   Do not eat or drink for 6 hours prior to exam.  The MRI machine uses a strong magnet. Please wear clothes without metal (snaps, zippers). A sweatsuit works well, or we may give you a hospital gown.  Please remove any body piercings and hair extensions before you arrive. You will also remove watches, jewelry, hairpins,  wallets, dentures, partial dental plates and hearing aids. You may wear contact lenses, and you may be able to wear your rings. We have a safe place to keep your personal items, but it is safer to leave them at home.  **IMPORTANT** THE INSTRUCTIONS BELOW ARE ONLY FOR THOSE PATIENTS WHO HAVE BEEN PRESCRIBED SEDATION OR GENERAL ANESTHESIA DURING THEIR MRI PROCEDURE:  IF YOUR DOCTOR PRESCRIBED ORAL SEDATION (take medicine to help you relax during your exam):   You must get the medicine from your doctor (oral medication) before you arrive. Bring the medicine to the exam. Do not take it at home. You ll be told when to take it upon arriving for your exam.   Arrive one hour early. Bring someone who can take you home after the test. Your medicine will make you sleepy. After the exam, you may not drive, take a bus or take a taxi by yourself.  IF YOUR DOCTOR PRESCRIBED IV SEDATION:   Arrive one hour early. Bring someone who can take you home after the test. Your medicine will make you sleepy. After the exam, you may not drive, take a bus or take a taxi by yourself.   No eating 6 hours before your exam. You may have clear liquids up until 4 hours before your exam. (Clear liquids include water, clear tea, black coffee and fruit juice without pulp.)  IF YOUR DOCTOR PRESCRIBED ANESTHESIA (be asleep for your exam):   Arrive 1 1/2 hours early. Bring someone who can take you home after the test. You may not drive, take a bus or take a taxi by yourself.   No eating 8 hours before your exam. You may have clear liquids up until 4 hours before your exam. (Clear liquids include water, clear tea, black coffee and fruit juice without pulp.)   You will spend four to five hours in the recovery room.  If you have any questions, please contact your Imaging Department exam site.            Oct 03, 2018  9:20 AM CDT   CT CHEST W/O CONTRAST with UCCT1   [facility] CT ([facility])    [address]  [facility] [id number] [phone]           Please bring any scans or X-rays taken at other hospitals, if similar tests were done. Also bring a list of your medicines, including vitamins, minerals and over-the-counter drugs. It is safest to leave personal items at home.  Be sure to tell your doctor:   If you have any allergies.   If there s any chance you are pregnant.   If you are breastfeeding.  You do not need to do anything special to prepare for this exam.  Please wear loose clothing, such as a sweat suit or jogging clothes. Avoid snaps, zippers and other metal. We may ask you to undress and put on a hospital gown.            Oct 03, 2018  9:45 AM CDT   Lab with UC LAB   [facility] Lab [facility])    [address] [place] [address]  [address]st [facility] [address] [phone]            Oct 04, 2018  4:00 PM CDT   (Arrive by 3:45 PM)   Return Visit with Hi Melgar MD   [facility] ([facility])    [address] [place] [address]  [address]  [facility] [id number] [phone]            Oct 12, 2018  9:00 AM CDT   (Arrive by 8:45 AM)   Return Visit with Debora Ac MD   [facility] ([facility])    [address] [place] [address]  [address]  [facility] [address] [phone]            Nov 14, 2018  8:00 AM CST   Lab with UC LAB   [facility] Lab ([facility])    [address] [place] [address]  [address]st [facility] [id number] [phone]            Nov 14, 2018  9:00 AM CST   (Arrive by 8:45 AM)   Return General Liver with Soledad Skelton PA-C   [facility] Hepatology [facility])    [address] [place] [address]  [address]  [facility] [address] [phone]              Who to contact     If you have questions or need follow up information about today's clinic visit or your schedule please contact [facility] AND INFECTIOUS DISEASES  "directly at [phone].  Normal or non-critical lab and imaging results will be communicated to you by MyChart, letter or phone within 4 business days after the clinic has received the results. If you do not hear from us within 7 days, please contact the clinic through MyChart or phone. If you have a critical or abnormal lab result, we will notify you by phone as soon as possible.  Submit refill requests through [employer] or call your pharmacy and they will forward the refill request to us. Please allow 3 business days for your refill to be completed.          Additional Information About Your Visit        Care EveryWhere ID     This is your Care EveryWhere ID. This could be used by other organizations to access your [facility] medical records  [id number]        Your Vitals Were     Pulse Temperature Height Pulse Oximetry BMI (Body Mass Index)       54 97.6  F (36.4  C) (Oral) 1.702 m (5' 7\") 100% 28.16 kg/m2        Blood Pressure from Last 3 Encounters:   08/21/18 154/81   08/21/18 138/73   08/15/18 140/63    Weight from Last 3 Encounters:   08/21/18 81.6 kg (179 lb 12.8 oz)   08/21/18 81.6 kg (180 lb)   08/15/18 81.6 kg (180 lb)              We Performed the Following     TDAP VACCINE (BOOSTRIX)          Today's Medication Changes          These changes are accurate as of 8/21/18  4:22 PM.  If you have any questions, ask your nurse or doctor.               Start taking these medicines.        Dose/Directions    rifampin 300 MG capsule   Commonly known as:  RIFADIN   Used for:  Latent tuberculosis infection   Started by:  Pauline Clancy MD        Dose:  600 mg   Take 2 capsules (600 mg) by mouth daily   Quantity:  60 capsule   Refills:  3            Where to get your medicines      These medications were sent to [facility] [place], [place] - [address]  [address], [facility] [address]     Phone:  [phone]     rifampin 300 MG capsule                Primary Care " Provider    [facility] MD       [address] [address]        Equal Access to Services     DAVID COWAN : Hadii aad ku hadpinoshawanda [place], washerrideni cheema, beenadavid bushberyldeni hill, javier edmondskaitloco mckay. So [facility] [phone].    ATENCIÓN: Si habla español, tiene a samaniego disposición servicios gratuitos de asistencia lingüística. Llame al [phone].    We comply with applicable federal civil rights laws and [place] laws. We do not discriminate on the basis of race, color, national origin, age, disability, sex, sexual orientation, or gender identity.            Thank you!     Thank you for choosing [facility] AND INFECTIOUS DISEASES  for your care. Our goal is always to provide you with excellent care. Hearing back from our patients is one way we can continue to improve our services. Please take a few minutes to complete the written survey that you may receive in the mail after your visit with us. Thank you!             Your Updated Medication List - Protect others around you: Learn how to safely use, store and throw away your medicines at www.disposemymeds.org.          This list is accurate as of 8/21/18  4:22 PM.  Always use your most recent med list.                   Brand Name Dispense Instructions for use Diagnosis    alfuzosin 10 MG 24 hr tablet    UROXATRAL     Take 10 mg by mouth daily    Alcoholic cirrhosis of liver without ascites (H), HCC (hepatocellular carcinoma) (H)       * furosemide 20 MG tablet    LASIX    15 tablet    TAKE ONE-HALF TABLET BY MOUTH DAILY    Cirrhosis of liver (H)       * furosemide 20 MG tablet    LASIX    60 tablet    Take 1 tablet (20 mg) by mouth daily    Alcoholic cirrhosis of liver without ascites (H), HCC (hepatocellular carcinoma) (H), Edema, unspecified type       NADOLOL PO      Take 20 mg by mouth daily        omeprazole 20 MG CR capsule    priLOSEC     Take 20 mg by mouth        rifampin 300 MG  capsule    RIFADIN    60 capsule    Take 2 capsules (600 mg) by mouth daily    Latent tuberculosis infection       * spironolactone 25 MG tablet    ALDACTONE    30 tablet    Take 1 tablet (25 mg) by mouth daily    Cirrhosis of liver (H)       * spironolactone 25 MG tablet    ALDACTONE    60 tablet    Take 2 tablets (50 mg) by mouth daily    Alcoholic cirrhosis of liver without ascites (H), HCC (hepatocellular carcinoma) (H), Edema, unspecified type       XIFAXAN 550 MG Tabs tablet   Generic drug:  rifaximin           * Notice:  This list has 4 medication(s) that are the same as other medications prescribed for you. Read the directions carefully, and ask your doctor or other care provider to review them with you.

## 2018-08-21 NOTE — LETTER
RE: Loy Limon  [address]  [facility] [address]       August 21, 2018        Emil Echevarria MD  [facility]  [place], [place]     Tamela Carballo MD  [facility] Gastroenterology and Hepatology  [place], [place]      Dear Musa Echevarria and Haris,    I had the pleasure of seeing your patient Mr. Loy Limon at the [facility]monary [facility]pertension [facility].  As you know, Mr. Limon is a 63 year old [place] gentleman with a history of alcoholic cirrhosis complicated by hepatocellular carcinoma that is being treated with TACE and he is being evaluated for possible liver transplantation, history of alcohol abuse, and minimal evidence of complications of cirrhosis such as hepatic encephalopathy and ascites who presents for evaluation for possible pulmonary hypertension due to elevated PASP on his stress echocardiogram.  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  He is able to walk up a flight of stairs several times a day without any serious presyncope and has never had a syncopal event.  Occasionally he would have some mild chest discomfort, but it is not consistently present with exertion.  He does have some issues with lower extremity edema especially on days that he has high salt intake.  Since then, he has reduced his salt intake and he has noticed that has significantly improved.  He does take lasix and aldactone to assist with volume control.  He does not have any orthopnea or paroxysmal nocturnal dyspnea.  Overall, I would classify him as WHO functional class II.    Mr. Limon has been living in [place] for 30 years.  He was born in [place] and has moved here and been basically working ever since.  He does state that he is a former smoker but has not smoked for over 10 years.  He has been abstinent from alcohol for over 6 months.  Overall, he is happy with how things are progressing and hopes that he can find  an answer for his liver problems.    Mr. Limon did get a right heart catheterization that showed normal hemodynamics.  He continues to do well overall with no change is his dyspnea.  He has noticed some slight lightheadedness if he stands up abruptly.  He has some lower extremity edema.    PAST MEDICAL HISTORY:  Past Medical History:   Diagnosis Date     Cancer (H)     hepatocellualr carcinoma     Cirrhosis of liver (H) 5/8/2018     Enlarged prostate      Inguinal hernia      Liver lesion 5/8/2018       FAMILY HISTORY:  No family history of pulmonary hypertension but brother had coronary artery disease    SOCIAL HISTORY:  Social History     Social History     Marital status:      Spouse name: N/A     Number of children: N/A     Years of education: N/A     Social History Main Topics     Smoking status: Former Smoker     Packs/day: 0.03     Years: 20.00     Types: Cigarettes, Cigars     Smokeless tobacco: Never Used      Comment: Quit smoking Feb 2018, one cigarette after dinner     Alcohol use Yes      Comment: Quit drinking Feb 2018     Drug use: No     Sexual activity: Not on file     Other Topics Concern     Not on file     Social History Narrative       CURRENT MEDICATIONS:  Current Outpatient Prescriptions   Medication Sig Dispense Refill     alfuzosin (UROXATRAL) 10 MG 24 hr tablet Take 10 mg by mouth daily       furosemide (LASIX) 20 MG tablet Take 1 tablet (20 mg) by mouth daily 60 tablet 3     furosemide (LASIX) 20 MG tablet TAKE ONE-HALF TABLET BY MOUTH DAILY 15 tablet 0     HYDROmorphone (DILAUDID) 2 MG tablet        NADOLOL PO Take 20 mg by mouth daily       omeprazole (PRILOSEC) 20 MG CR capsule Take 20 mg by mouth       spironolactone (ALDACTONE) 25 MG tablet Take 2 tablets (50 mg) by mouth daily 60 tablet 3     spironolactone (ALDACTONE) 25 MG tablet Take 1 tablet (25 mg) by mouth daily 30 tablet 0     XIFAXAN 550 MG TABS tablet          ROS:   10 point ROS negative except HPI    EXAM:  BP  "138/73 (BP Location: Right arm, Patient Position: Chair, Cuff Size: Adult Regular)  Pulse 51  Ht 1.702 m (5' 7\")  Wt 81.6 kg (180 lb)  SpO2 99%  BMI 28.19 kg/m2  General: appears comfortable, alert and articulate  Head: normocephalic, atraumatic  Eyes: anicteric sclera, EOMI  Neck: no adenopathy  Orophyarynx: moist mucosa, no lesions, dentition intact  Heart: regular with slight bradycardiac, S1/S2, no murmur, gallop, rub, estimated JVP 5 cm  Lungs: clear, no rales or wheezing  Abdomen: soft, non-tender, bowel sounds present, hepatomegaly present, scar on the midline below umbilicus  Extremities: Trace to 1+ pitting edema with right worse than left  Neurological: normal speech and affect, no gross motor deficits    Labs:  CBC RESULTS:  Lab Results   Component Value Date    WBC 2.6 (L) 07/19/2018    RBC 3.41 (L) 07/19/2018    HGB 11.6 (L) 07/19/2018    HCT 33.6 (L) 07/19/2018    MCV 99 07/19/2018    MCH 34.0 (H) 07/19/2018    MCHC 34.5 07/19/2018    RDW 14.5 07/19/2018    PLT 72 (L) 07/19/2018       CMP RESULTS:  Lab Results   Component Value Date     08/15/2018    POTASSIUM 4.4 08/15/2018    CHLORIDE 113 (H) 08/15/2018    CO2 23 08/15/2018    ANIONGAP 6 08/15/2018     (H) 08/15/2018    BUN 12 08/15/2018    CR 0.54 (L) 08/15/2018    GFRESTIMATED >90 08/15/2018    GFRESTBLACK >90 08/15/2018    YELENA 8.3 (L) 08/15/2018    BILITOTAL 2.1 (H) 07/25/2018    ALBUMIN 2.2 (L) 07/25/2018    ALKPHOS 230 (H) 07/25/2018    ALT 39 07/25/2018    AST 48 (H) 07/25/2018        Echocardiogram 6/20/2018:  Non diagnostic dobutamine stress echo due to failure to meet target heart rate. Test was terminated at a HR of 62 bpm (40% age-predicted max HR) due to hypertensive response to dobutamine (/73->266/102.) Pulmonary hypertension is present (RVSP 46 mmHg above RA pressure.)     Blunted HR amd normal BP response to dobutamine.  Hyperdynamic left ventricular function at rest, LVEF=65-70%.  No regional wall motion " abnormalities were present at rest or with dobutamine at a below-diagnostic workload.  No ECG evidence of ischemia at a below-diagnostic workload.    RHC 8/15/2018  RA: 9  RV: 34/9  PA: 34/14 (21)  PWCP: 14  CO/CI (TD): 5.4/2.8  PVR: 1.3    CT Coronary Angiogram 7/25/2018  1.  Mild non-obstructive CAD involving the LAD.  2.  Total Agatston score 996 placing the patient in the 97th percentile when compared to age and gender matched control group.  3.  Please review Radiology report for incidental noncardiac findings that will follow separately.    Assessment and Plan: Mr. Limon is a 63 year old male with history of alcoholic cirrhosis who was referred for evaluation for possible portopulmonary hypertension.    1.  Hemodynamic evaluation for liver transplant:  He does no have pulmonary hypertension.  His PVR is normal at 1.3 and his PA pressures are borderline at 21 mm Hg, likely due to high output state from his cirrhosis with a cardiac index of 2.8.  He would not need any PAH-specific therapy at this time.  He should proceed with liver transplant as directed by the liver transplant team.  If there are any troubles in the willy-operative period, we would be happy to assist in his care.    I did reiterate the need for sodium and fluid restriction to assist with his lower extremity edema.  He voiced understanding of the need to limit both to help with his edema.    2.  Concern for coronary artery disease: He did have a coronary CT which showed no obstructive coronary disease.  No further evaluation is needed at this time.    This interaction was assisted with the assistance of .    It was a pleasure seeing your patient Loy Limon at the [facility] Pulmonary Hypertension clinic.  Please contact us with any questions or concerns that you may have.    Sincerely,    Ayaan Coughlin MD, PhD   of Medicine  [facility]  Cardiovascular  Division  [facility]                  Ayaan Coughlin MD

## 2018-08-21 NOTE — NURSING NOTE
Chief Complaint   Patient presents with     Follow Up For     Return for PH F/U with labs prior     Vitals were taken and medications were reconciled.    Christal Kumar RMA  11:08 AM

## 2018-08-21 NOTE — PROGRESS NOTES
August 21, 2018        Emil Echevarria MD  [facility] [[email or web address] Surgery  [place], [place]     Tamela Carballo MD  [facility] [[email or web address]] Gastroenterology and Hepatology  [place], [place]      Dear Musa Echevarria and Haris,    I had the pleasure of seeing your patient Mr. Loy Limon at the [facility] Pulmonary Hypertension [facility].  As you know, Mr. Limon is a 63 year old French gentleman with a history of alcoholic cirrhosis complicated by hepatocellular carcinoma that is being treated with TACE and he is being evaluated for possible liver transplantation, history of alcohol abuse, and minimal evidence of complications of cirrhosis such as hepatic encephalopathy and ascites who presents for evaluation for possible pulmonary hypertension due to elevated PASP on his stress echocardiogram.  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  He is able to walk up a flight of stairs several times a day without any serious presyncope and has never had a syncopal event.  Occasionally he would have some mild chest discomfort, but it is not consistently present with exertion.  He does have some issues with lower extremity edema especially on days that he has high salt intake.  Since then, he has reduced his salt intake and he has noticed that has significantly improved.  He does take lasix and aldactone to assist with volume control.  He does not have any orthopnea or paroxysmal nocturnal dyspnea.  Overall, I would classify him as WHO functional class II.    Mr. Limon has been living in [place] for 30 years.  He was born in [place] and has moved here and been basically working ever since.  He does state that he is a former smoker but has not smoked for over 10 years.  He has been abstinent from alcohol for over 6 months.  Overall, he is happy with how things are progressing and hopes that he can find an answer for his liver problems.    Mr. Limon did get a right heart  catheterization that showed normal hemodynamics.  He continues to do well overall with no change is his dyspnea.  He has noticed some slight lightheadedness if he stands up abruptly.  He has some lower extremity edema.    PAST MEDICAL HISTORY:  Past Medical History:   Diagnosis Date     Cancer (H)     hepatocellualr carcinoma     Cirrhosis of liver (H) 5/8/2018     Enlarged prostate      Inguinal hernia      Liver lesion 5/8/2018       FAMILY HISTORY:  No family history of pulmonary hypertension but brother had coronary artery disease    SOCIAL HISTORY:  Social History     Social History     Marital status:      Spouse name: N/A     Number of children: N/A     Years of education: N/A     Social History Main Topics     Smoking status: Former Smoker     Packs/day: 0.03     Years: 20.00     Types: Cigarettes, Cigars     Smokeless tobacco: Never Used      Comment: Quit smoking Feb 2018, one cigarette after dinner     Alcohol use Yes      Comment: Quit drinking Feb 2018     Drug use: No     Sexual activity: Not on file     Other Topics Concern     Not on file     Social History Narrative       CURRENT MEDICATIONS:  Current Outpatient Prescriptions   Medication Sig Dispense Refill     alfuzosin (UROXATRAL) 10 MG 24 hr tablet Take 10 mg by mouth daily       furosemide (LASIX) 20 MG tablet Take 1 tablet (20 mg) by mouth daily 60 tablet 3     furosemide (LASIX) 20 MG tablet TAKE ONE-HALF TABLET BY MOUTH DAILY 15 tablet 0     HYDROmorphone (DILAUDID) 2 MG tablet        NADOLOL PO Take 20 mg by mouth daily       omeprazole (PRILOSEC) 20 MG CR capsule Take 20 mg by mouth       spironolactone (ALDACTONE) 25 MG tablet Take 2 tablets (50 mg) by mouth daily 60 tablet 3     spironolactone (ALDACTONE) 25 MG tablet Take 1 tablet (25 mg) by mouth daily 30 tablet 0     XIFAXAN 550 MG TABS tablet          ROS:   10 point ROS negative except HPI    EXAM:  /73 (BP Location: Right arm, Patient Position: Chair, Cuff Size: Adult  "Regular)  Pulse 51  Ht 1.702 m (5' 7\")  Wt 81.6 kg (180 lb)  SpO2 99%  BMI 28.19 kg/m2  General: appears comfortable, alert and articulate  Head: normocephalic, atraumatic  Eyes: anicteric sclera, EOMI  Neck: no adenopathy  Orophyarynx: moist mucosa, no lesions, dentition intact  Heart: regular with slight bradycardiac, S1/S2, no murmur, gallop, rub, estimated JVP 5 cm  Lungs: clear, no rales or wheezing  Abdomen: soft, non-tender, bowel sounds present, hepatomegaly present, scar on the midline below umbilicus  Extremities: Trace to 1+ pitting edema with right worse than left  Neurological: normal speech and affect, no gross motor deficits    Labs:  CBC RESULTS:  Lab Results   Component Value Date    WBC 2.6 (L) 07/19/2018    RBC 3.41 (L) 07/19/2018    HGB 11.6 (L) 07/19/2018    HCT 33.6 (L) 07/19/2018    MCV 99 07/19/2018    MCH 34.0 (H) 07/19/2018    MCHC 34.5 07/19/2018    RDW 14.5 07/19/2018    PLT 72 (L) 07/19/2018       CMP RESULTS:  Lab Results   Component Value Date     08/15/2018    POTASSIUM 4.4 08/15/2018    CHLORIDE 113 (H) 08/15/2018    CO2 23 08/15/2018    ANIONGAP 6 08/15/2018     (H) 08/15/2018    BUN 12 08/15/2018    CR 0.54 (L) 08/15/2018    GFRESTIMATED >90 08/15/2018    GFRESTBLACK >90 08/15/2018    YELENA 8.3 (L) 08/15/2018    BILITOTAL 2.1 (H) 07/25/2018    ALBUMIN 2.2 (L) 07/25/2018    ALKPHOS 230 (H) 07/25/2018    ALT 39 07/25/2018    AST 48 (H) 07/25/2018        Echocardiogram 6/20/2018:  Non diagnostic dobutamine stress echo due to failure to meet target heart rate. Test was terminated at a HR of 62 bpm (40% age-predicted max HR) due to hypertensive response to dobutamine (/73->266/102.) Pulmonary hypertension is present (RVSP 46 mmHg above RA pressure.)     Blunted HR amd normal BP response to dobutamine.  Hyperdynamic left ventricular function at rest, LVEF=65-70%.  No regional wall motion abnormalities were present at rest or with dobutamine at a below-diagnostic " workload.  No ECG evidence of ischemia at a below-diagnostic workload.    [facility] 8/15/2018  RA: 9  RV: 34/9  PA: 34/14 (21)  PWCP: 14  CO/CI (TD): 5.4/2.8  PVR: 1.3    CT Coronary Angiogram 7/25/2018  1.  Mild non-obstructive CAD involving the LAD.  2.  Total Agatston score 996 placing the patient in the 97th percentile when compared to age and gender matched control group.  3.  Please review Radiology report for incidental noncardiac findings that will follow separately.    Assessment and Plan: Mr. Limon is a 63 year old male with history of alcoholic cirrhosis who was referred for evaluation for possible portopulmonary hypertension.    1.  Hemodynamic evaluation for liver transplant:  He does no have pulmonary hypertension.  His PVR is normal at 1.3 and his PA pressures are borderline at 21 mm Hg, likely due to high output state from his cirrhosis with a cardiac index of 2.8.  He would not need any PAH-specific therapy at this time.  He should proceed with liver transplant as directed by the liver transplant team.  If there are any troubles in the willy-operative period, we would be happy to assist in his care.    I did reiterate the need for sodium and fluid restriction to assist with his lower extremity edema.  He voiced understanding of the need to limit both to help with his edema.    2.  Concern for coronary artery disease: He did have a coronary CT which showed no obstructive coronary disease.  No further evaluation is needed at this time.    This interaction was assisted with the assistance of .    It was a pleasure seeing your patient Loy Limon at the [facility] Pulmonary Hypertension clinic.  Please contact us with any questions or concerns that you may have.    Sincerely,    Ayaan Coughlin MD, PhD   of Medicine  [facility] for Pulmonary Hypertension  Cardiovascular Division  [facility]

## 2018-08-21 NOTE — LETTER
August 21, 2018    To Whom it May Concern,    Due to medical reasons it is recommended that Loy Limon not work more than 36 hours per week, and that he not lift more than 15 pounds.      Sincerely,         Tamela Carballo MD  Hepatology  [facility]

## 2018-08-21 NOTE — LETTER
8/21/2018      RE: Loy Limon  [address]  [facility] [facility] [address]       Dear Colleague,    Thank you for the opportunity to participate in the care of your patient, Loy Limon, at the [facility]. Please see a copy of my visit note below.    August 21, 2018        Emil Echevarria MD  [facility] Surgery  [place], [place]     Tamela Carballo MD  [facility] Gastroenterology and Hepatology  [place], [place]      Dear Musa Echevarria and Haris,    I had the pleasure of seeing your patient Mr. Loy Limon at the [facility] Pulmonary Hypertension Clinic.  As you know, Mr. Limon is a 63 year old [place]ian gentleman with a history of alcoholic cirrhosis complicated by hepatocellular carcinoma that is being treated with TACE and he is being evaluated for possible liver transplantation, history of alcohol abuse, and minimal evidence of complications of cirrhosis such as hepatic encephalopathy and ascites who presents for evaluation for possible pulmonary hypertension due to elevated PASP on his stress echocardiogram.  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  He is able to walk up a flight of stairs several times a day without any serious presyncope and has never had a syncopal event.  Occasionally he would have some mild chest discomfort, but it is not consistently present with exertion.  He does have some issues with lower extremity edema especially on days that he has high salt intake.  Since then, he has reduced his salt intake and he has noticed that has significantly improved.  He does take lasix and aldactone to assist with volume control.  He does not have any orthopnea or paroxysmal nocturnal dyspnea.  Overall, I would classify him as WHO functional class II.    Mr. Limon has been living in [place] for 30 years.  He was born in [place] and has moved here and been  basically working ever since.  He does state that he is a former smoker but has not smoked for over 10 years.  He has been abstinent from alcohol for over 6 months.  Overall, he is happy with how things are progressing and hopes that he can find an answer for his liver problems.    Mr. Limon did get a right heart catheterization that showed normal hemodynamics.  He continues to do well overall with no change is his dyspnea.  He has noticed some slight lightheadedness if he stands up abruptly.  He has some lower extremity edema.    PAST MEDICAL HISTORY:  Past Medical History:   Diagnosis Date     Cancer (H)     hepatocellualr carcinoma     Cirrhosis of liver (H) 5/8/2018     Enlarged prostate      Inguinal hernia      Liver lesion 5/8/2018       FAMILY HISTORY:  No family history of pulmonary hypertension but brother had coronary artery disease    SOCIAL HISTORY:  Social History     Social History     Marital status:      Spouse name: N/A     Number of children: N/A     Years of education: N/A     Social History Main Topics     Smoking status: Former Smoker     Packs/day: 0.03     Years: 20.00     Types: Cigarettes, Cigars     Smokeless tobacco: Never Used      Comment: Quit smoking Feb 2018, one cigarette after dinner     Alcohol use Yes      Comment: Quit drinking Feb 2018     Drug use: No     Sexual activity: Not on file     Other Topics Concern     Not on file     Social History Narrative       CURRENT MEDICATIONS:  Current Outpatient Prescriptions   Medication Sig Dispense Refill     alfuzosin (UROXATRAL) 10 MG 24 hr tablet Take 10 mg by mouth daily       furosemide (LASIX) 20 MG tablet Take 1 tablet (20 mg) by mouth daily 60 tablet 3     furosemide (LASIX) 20 MG tablet TAKE ONE-HALF TABLET BY MOUTH DAILY 15 tablet 0     HYDROmorphone (DILAUDID) 2 MG tablet        NADOLOL PO Take 20 mg by mouth daily       omeprazole (PRILOSEC) 20 MG CR capsule Take 20 mg by mouth       spironolactone (ALDACTONE) 25 MG  "tablet Take 2 tablets (50 mg) by mouth daily 60 tablet 3     spironolactone (ALDACTONE) 25 MG tablet Take 1 tablet (25 mg) by mouth daily 30 tablet 0     XIFAXAN 550 MG TABS tablet          ROS:   10 point ROS negative except HPI    EXAM:  /73 (BP Location: Right arm, Patient Position: Chair, Cuff Size: Adult Regular)  Pulse 51  Ht 1.702 m (5' 7\")  Wt 81.6 kg (180 lb)  SpO2 99%  BMI 28.19 kg/m2  General: appears comfortable, alert and articulate  Head: normocephalic, atraumatic  Eyes: anicteric sclera, EOMI  Neck: no adenopathy  Orophyarynx: moist mucosa, no lesions, dentition intact  Heart: regular with slight bradycardiac, S1/S2, no murmur, gallop, rub, estimated JVP 5 cm  Lungs: clear, no rales or wheezing  Abdomen: soft, non-tender, bowel sounds present, hepatomegaly present, scar on the midline below umbilicus  Extremities: Trace to 1+ pitting edema with right worse than left  Neurological: normal speech and affect, no gross motor deficits    Labs:  CBC RESULTS:  Lab Results   Component Value Date    WBC 2.6 (L) 07/19/2018    RBC 3.41 (L) 07/19/2018    HGB 11.6 (L) 07/19/2018    HCT 33.6 (L) 07/19/2018    MCV 99 07/19/2018    MCH 34.0 (H) 07/19/2018    MCHC 34.5 07/19/2018    RDW 14.5 07/19/2018    PLT 72 (L) 07/19/2018       CMP RESULTS:  Lab Results   Component Value Date     08/15/2018    POTASSIUM 4.4 08/15/2018    CHLORIDE 113 (H) 08/15/2018    CO2 23 08/15/2018    ANIONGAP 6 08/15/2018     (H) 08/15/2018    BUN 12 08/15/2018    CR 0.54 (L) 08/15/2018    GFRESTIMATED >90 08/15/2018    GFRESTBLACK >90 08/15/2018    YELENA 8.3 (L) 08/15/2018    BILITOTAL 2.1 (H) 07/25/2018    ALBUMIN 2.2 (L) 07/25/2018    ALKPHOS 230 (H) 07/25/2018    ALT 39 07/25/2018    AST 48 (H) 07/25/2018        Echocardiogram 6/20/2018:  Non diagnostic dobutamine stress echo due to failure to meet target heart rate. Test was terminated at a HR of 62 bpm (40% age-predicted max HR) due to hypertensive response to " dobutamine (/73->266/102.) Pulmonary hypertension is present (RVSP 46 mmHg above RA pressure.)     Blunted HR amd normal BP response to dobutamine.  Hyperdynamic left ventricular function at rest, LVEF=65-70%.  No regional wall motion abnormalities were present at rest or with dobutamine at a below-diagnostic workload.  No ECG evidence of ischemia at a below-diagnostic workload.    [facility] 8/15/2018  RA: 9  RV: 34/9  PA: 34/14 (21)  PWCP: 14  CO/CI (TD): 5.4/2.8  PVR: 1.3    CT Coronary Angiogram 7/25/2018  1.  Mild non-obstructive CAD involving the LAD.  2.  Total Agatston score 996 placing the patient in the 97th percentile when compared to age and gender matched control group.  3.  Please review Radiology report for incidental noncardiac findings that will follow separately.    Assessment and Plan: Mr. Limon is a 63 year old male with history of alcoholic cirrhosis who was referred for evaluation for possible portopulmonary hypertension.    1.  Hemodynamic evaluation for liver transplant:  He does no have pulmonary hypertension.  His PVR is normal at 1.3 and his PA pressures are borderline at 21 mm Hg, likely due to high output state from his cirrhosis with a cardiac index of 2.8.  He would not need any PAH-specific therapy at this time.  He should proceed with liver transplant as directed by the liver transplant team.  If there are any troubles in the willy-operative period, we would be happy to assist in his care.    I did reiterate the need for sodium and fluid restriction to assist with his lower extremity edema.  He voiced understanding of the need to limit both to help with his edema.    2.  Concern for coronary artery disease: He did have a coronary CT which showed no obstructive coronary disease.  No further evaluation is needed at this time.    This interaction was assisted with the assistance of .    It was a pleasure seeing your patient Loy Limon at the [facility]  Pulmonary Hypertension clinic.  Please contact us with any questions or concerns that you may have.    Sincerely,    Ayaan Coughlin MD, PhD   of Medicine  Center for Pulmonary Hypertension  Cardiovascular Division  [facility]

## 2018-08-22 LAB — ETHYL GLUCURONIDE UR QL: NEGATIVE

## 2018-08-22 NOTE — PROGRESS NOTES
Pt requesting documentation be sent to his work with recommendations from Dr. Carballo regarding hours/week and lifting restriction. Above reviewed with Dr. Carballo and letter per Dr. Carballo faxed to pt's daughter per pt request.

## 2018-08-24 ENCOUNTER — OFFICE VISIT (OUTPATIENT)
Dept: UROLOGY | Facility: CLINIC | Age: 63
End: 2018-08-24
Payer: MEDICARE

## 2018-08-24 VITALS
HEART RATE: 51 BPM | BODY MASS INDEX: 28 KG/M2 | HEIGHT: 67 IN | WEIGHT: 178.4 LBS | SYSTOLIC BLOOD PRESSURE: 116 MMHG | DIASTOLIC BLOOD PRESSURE: 61 MMHG

## 2018-08-24 DIAGNOSIS — R35.1 NOCTURIA: Primary | ICD-10-CM

## 2018-08-24 ASSESSMENT — PAIN SCALES - GENERAL: PAINLEVEL: NO PAIN (0)

## 2018-08-24 NOTE — PATIENT INSTRUCTIONS
Please follow up in 6 months with a flow/PVR  Please come to this appointment with a full bladder   .    It was a pleasure meeting with you today.  Thank you for allowing me and my team the privilege of caring for you today.  YOU are the reason we are here, and I truly hope we provided you with the excellent service you deserve.  Please let us know if there is anything else we can do for you so that we can be sure you are leaving completely satisfied with your care experience.

## 2018-08-24 NOTE — NURSING NOTE
"Chief Complaint   Patient presents with     RECHECK     Flow/pvr       Blood pressure 116/61, pulse 51, height 1.702 m (5' 7\"), weight 80.9 kg (178 lb 6.4 oz). Body mass index is 27.94 kg/(m^2).    Patient Active Problem List   Diagnosis     Cirrhosis of liver (H)     Liver lesion     HCC (hepatocellular carcinoma) (H)       No Known Allergies    Current Outpatient Prescriptions   Medication Sig Dispense Refill     alfuzosin (UROXATRAL) 10 MG 24 hr tablet Take 10 mg by mouth daily       furosemide (LASIX) 20 MG tablet Take 1 tablet (20 mg) by mouth daily 60 tablet 3     NADOLOL PO Take 20 mg by mouth daily       omeprazole (PRILOSEC) 20 MG CR capsule Take 20 mg by mouth       rifampin (RIFADIN) 300 MG capsule Take 2 capsules (600 mg) by mouth daily 60 capsule 3     spironolactone (ALDACTONE) 25 MG tablet Take 2 tablets (50 mg) by mouth daily 60 tablet 3     XIFAXAN 550 MG TABS tablet        [DISCONTINUED] furosemide (LASIX) 20 MG tablet TAKE ONE-HALF TABLET BY MOUTH DAILY 15 tablet 0     [DISCONTINUED] spironolactone (ALDACTONE) 25 MG tablet Take 1 tablet (25 mg) by mouth daily 30 tablet 0       Social History   Substance Use Topics     Smoking status: Former Smoker     Packs/day: 0.03     Years: 20.00     Types: Cigarettes, Cigars     Start date: 8/14/1970     Quit date: 3/14/2018     Smokeless tobacco: Never Used      Comment: Quit smoking Feb 2018, one cigarette after dinner     Alcohol use No      Comment: Quit drinking Feb 2018       NIRMAL Kohli  8/24/2018  10:54 AM       "

## 2018-08-24 NOTE — PROGRESS NOTES
UROLOGIC DIAGNOSES:       CURRENT INTERVENTIONS:       HISTORY:       Patient presents for evaluation and management of BPH. Patient found to have enlarged prostate on  work up previously   Patient preparing for liver transplant and presents for clearance.      Nocturia 3x per night   Frequency up to 1.5-2 hours   Good stream of urine     Patient states that he is urinating q four hours at present. Nocturia is now 2x per night after starting afluzosin.     We discussed alpha blocker use (and continued use), lower urinary tract symptoms with improvement. Also discussed that patient is cleared for liver transplant from  perspective.          PAST MEDICAL HISTORY:   Past Medical History:   Diagnosis Date     Cancer (H)     hepatocellualr carcinoma     Cirrhosis of liver (H) 5/8/2018     Enlarged prostate      Inguinal hernia      Liver lesion 5/8/2018       PAST SURGICAL HISTORY:   Past Surgical History:   Procedure Laterality Date     APPENDECTOMY       COLONOSCOPY      2018       FAMILY HISTORY:   Family History   Problem Relation Age of Onset     Liver Disease Brother        SOCIAL HISTORY:   Social History   Substance Use Topics     Smoking status: Former Smoker     Packs/day: 0.03     Years: 20.00     Types: Cigarettes, Cigars     Start date: 8/14/1970     Quit date: 3/14/2018     Smokeless tobacco: Never Used      Comment: Quit smoking Feb 2018, one cigarette after dinner     Alcohol use No      Comment: Quit drinking Feb 2018       Current Outpatient Prescriptions   Medication     alfuzosin (UROXATRAL) 10 MG 24 hr tablet     furosemide (LASIX) 20 MG tablet     NADOLOL PO     omeprazole (PRILOSEC) 20 MG CR capsule     rifampin (RIFADIN) 300 MG capsule     spironolactone (ALDACTONE) 25 MG tablet     XIFAXAN 550 MG TABS tablet     [DISCONTINUED] furosemide (LASIX) 20 MG tablet     [DISCONTINUED] spironolactone (ALDACTONE) 25 MG tablet     No current facility-administered medications for this visit.   "        PHYSICAL EXAM:    /61  Pulse 51  Ht 1.702 m (5' 7\")  Wt 80.9 kg (178 lb 6.4 oz)  BMI 27.94 kg/m2    HEENT: Normocephalic and atraumatic   Cardiac: Not done  Back/Flank: Not done  CNS/PNS: Not done  Respiratory: Normal non-labored breathing  Abdomen: Soft nontender and nondistended  Peripheral Vascular: Not done  Mental Status: Not done    Penis: Not done  Scrotal Skin: Not done  Testicles: Not done  Epididymis: Not done  Digital Rectal Exam:     Cystoscopy: Not done    Imaging: None    Urinalysis: UA RESULTS:  Recent Labs   Lab Test  07/19/18   1133   COLOR  Yellow   APPEARANCE  Clear   URINEGLC  Negative   URINEBILI  Negative   URINEKETONE  Negative   SG  1.009   UBLD  Negative   URINEPH  8.0*   PROTEIN  Negative   NITRITE  Negative   LEUKEST  Negative       PSA: 3.27    Post Void Residual: 15ml    Other labs: None today      IMPRESSION:  64 y/o M with lower urinary tract symptoms now improved with alpha blocker     PLAN:  Continue alfuzosin   Patient cleared from  stand point for liver transplant, should keep taking alfuzosin   Uroflow/PVR in six months   Follow up in six months     Total Time: 15 minutes                                       Total in Consultation: greater than 50%     "

## 2018-08-24 NOTE — MR AVS SNAPSHOT
After Visit Summary   8/24/2018    Loy Limon    MRN: [id number]           Patient Information     Date Of Birth          1955        Visit Information        Provider Department      8/24/2018 10:30 AM Khloe Torres MD; VIDEO,  [facility] Urology and [facility] for Prostate and Urologic Cancers        Care Instructions    Please follow up in 6 months with a flow/PVR  Please come to this appointment with a full bladder   .    It was a pleasure meeting with you today.  Thank you for allowing me and my team the privilege of caring for you today.  YOU are the reason we are here, and I truly hope we provided you with the excellent service you deserve.  Please let us know if there is anything else we can do for you so that we can be sure you are leaving completely satisfied with your care experience.                  Follow-ups after your visit        Your next 10 appointments already scheduled     Sep 18, 2018  2:00 PM CDT   (Arrive by 1:45 PM)   Return Visit with Pauline Clancy MD   [facility] and Infectious Diseases ([facility])    [address]  [address]  [place] [id number] [phone]            Oct 03, 2018  8:30 AM CDT   MR ABDOMEN W/O & W CONTRAST with [address] [facility] [place] [facility] ([facility])    [address]  1st Floor  [place] [id number] [phone]           Take your medicines as usual, unless your doctor tells you not to. Bring a list of your current medicines to your exam (including vitamins, minerals and over-the-counter drugs). Also bring the results of similar scans you may have had.    You may or may not receive IV contrast for this exam pending the discretion of the Radiologist.   Do not eat or drink for 6 hours prior to exam.  The MRI machine uses a strong magnet. Please wear clothes without metal (snaps, zippers). A sweatsuit works well, or we may give  you a hospital gown.  Please remove any body piercings and hair extensions before you arrive. You will also remove watches, jewelry, hairpins, wallets, dentures, partial dental plates and hearing aids. You may wear contact lenses, and you may be able to wear your rings. We have a safe place to keep your personal items, but it is safer to leave them at home.  **IMPORTANT** THE INSTRUCTIONS BELOW ARE ONLY FOR THOSE PATIENTS WHO HAVE BEEN PRESCRIBED SEDATION OR GENERAL ANESTHESIA DURING THEIR MRI PROCEDURE:  IF YOUR DOCTOR PRESCRIBED ORAL SEDATION (take medicine to help you relax during your exam):   You must get the medicine from your doctor (oral medication) before you arrive. Bring the medicine to the exam. Do not take it at home. You ll be told when to take it upon arriving for your exam.   Arrive one hour early. Bring someone who can take you home after the test. Your medicine will make you sleepy. After the exam, you may not drive, take a bus or take a taxi by yourself.  IF YOUR DOCTOR PRESCRIBED IV SEDATION:   Arrive one hour early. Bring someone who can take you home after the test. Your medicine will make you sleepy. After the exam, you may not drive, take a bus or take a taxi by yourself.   No eating 6 hours before your exam. You may have clear liquids up until 4 hours before your exam. (Clear liquids include water, clear tea, black coffee and fruit juice without pulp.)  IF YOUR DOCTOR PRESCRIBED ANESTHESIA (be asleep for your exam):   Arrive 1 1/2 hours early. Bring someone who can take you home after the test. You may not drive, take a bus or take a taxi by yourself.   No eating 8 hours before your exam. You may have clear liquids up until 4 hours before your exam. (Clear liquids include water, clear tea, black coffee and fruit juice without pulp.)   You will spend four to five hours in the recovery room.  If you have any questions, please contact your Imaging Department exam site.            Oct 03, 2018   9:20 AM CDT   CT CHEST W/O CONTRAST with UCCT1   [facility] [place] [facility] ([facility])    [address]  1st Floor  [place] [facility] [place] [id number] [phone]           Please bring any scans or X-rays taken at other hospitals, if similar tests were done. Also bring a list of your medicines, including vitamins, minerals and over-the-counter drugs. It is safest to leave personal items at home.  Be sure to tell your doctor:   If you have any allergies.   If there s any chance you are pregnant.   If you are breastfeeding.  You do not need to do anything special to prepare for this exam.  Please wear loose clothing, such as a sweat suit or jogging clothes. Avoid snaps, zippers and other metal. We may ask you to undress and put on a hospital gown.            Oct 03, 2018  9:45 AM CDT   Lab with  LAB    [facility] ([facility])    [address]  1st [facility] [id number] [phone]            Oct 04, 2018  4:00 PM CDT   (Arrive by 3:45 PM)   Return Visit with Hi Melgar MD   [facility] ([facility])    [address]  [address]  [facility] [address] [phone]            Oct 12, 2018  9:00 AM CDT   (Arrive by 8:45 AM)   Return Visit with Debora Ac MD   [facility] ([facility])    [address]  [address]  [facility] [id number] [phone]            Nov 14, 2018  8:00 AM CST   Lab with  LAB    [facility] ([facility])    [address] [place] [address]  1st Floor  [facility] [id number] [phone]            Nov 14, 2018  9:00 AM CST   (Arrive by 8:45 AM)   Return General Liver with Soledad Skelton PA-C   [facility] ([facility])    [address] [place] [address]  [address]  [facility] [address]   [phone]            Mar 01, 2019 10:30 AM CST   (Arrive by  "10:15 AM)   Return Visit with Khloe Torres MD   [facility]rology and [facility] for Prostate and Urologic Cancers ([facility])    [address]  4th Floor  [facility] [address] [phone]              Who to contact     Please call your clinic at [phone] to:    Ask questions about your health    Make or cancel appointments    Discuss your medicines    Learn about your test results    Speak to your doctor            Additional Information About Your Visit        Care EveryWhere ID     This is your Care EveryWhere ID. This could be used by other organizations to access your [place] medical records  [id number]        Your Vitals Were     Pulse Height BMI (Body Mass Index)             51 1.702 m (5' 7\") 27.94 kg/m2          Blood Pressure from Last 3 Encounters:   08/24/18 116/61   08/21/18 154/81   08/21/18 138/73    Weight from Last 3 Encounters:   08/24/18 80.9 kg (178 lb 6.4 oz)   08/21/18 81.6 kg (179 lb 12.8 oz)   08/21/18 81.6 kg (180 lb)              Today, you had the following     No orders found for display       Primary Care Provider    [facility]       [address] [address]        Equal Access to Services     DAVID COWAN : Hadii aad ku hadasho Sodouglasali, waaxda luqadaha, qaybta kaalmada adeegyada, javier rico . So [facility] [phone].    ATENCIÓN: Si habla español, tiene a asmaniego disposición servicios gratuitos de asistencia lingüística. Llyan al [phone].    We comply with applicable federal civil rights laws and [place] laws. We do not discriminate on the basis of race, color, national origin, age, disability, sex, sexual orientation, or gender identity.            Thank you!     Thank you for choosing [facility] UROLOGY AND [facility] FOR PROSTATE AND UROLOGIC CANCERS  for your care. Our goal is always to provide you with excellent care. Hearing back from our patients is one way we " can continue to improve our services. Please take a few minutes to complete the written survey that you may receive in the mail after your visit with us. Thank you!             Your Updated Medication List - Protect others around you: Learn how to safely use, store and throw away your medicines at www.disposemymeds.org.          This list is accurate as of 8/24/18 11:41 AM.  Always use your most recent med list.                   Brand Name Dispense Instructions for use Diagnosis    alfuzosin 10 MG 24 hr tablet    UROXATRAL     Take 10 mg by mouth daily    Alcoholic cirrhosis of liver without ascites (H), HCC (hepatocellular carcinoma) (H)       furosemide 20 MG tablet    LASIX    60 tablet    Take 1 tablet (20 mg) by mouth daily    Alcoholic cirrhosis of liver without ascites (H), HCC (hepatocellular carcinoma) (H), Edema, unspecified type       NADOLOL PO      Take 20 mg by mouth daily        omeprazole 20 MG CR capsule    priLOSEC     Take 20 mg by mouth        rifampin 300 MG capsule    RIFADIN    60 capsule    Take 2 capsules (600 mg) by mouth daily    Latent tuberculosis infection       spironolactone 25 MG tablet    ALDACTONE    60 tablet    Take 2 tablets (50 mg) by mouth daily    Alcoholic cirrhosis of liver without ascites (H), HCC (hepatocellular carcinoma) (H), Edema, unspecified type       XIFAXAN 550 MG Tabs tablet   Generic drug:  rifaximin

## 2018-08-24 NOTE — LETTER
8/24/2018       RE: Loy Limon  [address]  [place] [address]     Dear Colleague,    Thank you for referring your patient, Loy Limon, to the [facility] UROLOGY AND INST FOR PROSTATE AND UROLOGIC CANCERS at [facility]. Please see a copy of my visit note below.    UROLOGIC DIAGNOSES:       CURRENT INTERVENTIONS:       HISTORY:       Patient presents for evaluation and management of BPH. Patient found to have enlarged prostate on  work up previously   Patient preparing for liver transplant and presents for clearance.      Nocturia 3x per night   Frequency up to 1.5-2 hours   Good stream of urine     Patient states that he is urinating q four hours at present. Nocturia is now 2x per night after starting afluzosin.     We discussed alpha blocker use (and continued use), lower urinary tract symptoms with improvement. Also discussed that patient is cleared for liver transplant from  perspective.          PAST MEDICAL HISTORY:   Past Medical History:   Diagnosis Date     Cancer (H)     hepatocellualr carcinoma     Cirrhosis of liver (H) 5/8/2018     Enlarged prostate      Inguinal hernia      Liver lesion 5/8/2018       PAST SURGICAL HISTORY:   Past Surgical History:   Procedure Laterality Date     APPENDECTOMY       COLONOSCOPY      2018       FAMILY HISTORY:   Family History   Problem Relation Age of Onset     Liver Disease Brother        SOCIAL HISTORY:   Social History   Substance Use Topics     Smoking status: Former Smoker     Packs/day: 0.03     Years: 20.00     Types: Cigarettes, Cigars     Start date: 8/14/1970     Quit date: 3/14/2018     Smokeless tobacco: Never Used      Comment: Quit smoking Feb 2018, one cigarette after dinner     Alcohol use No      Comment: Quit drinking Feb 2018       Current Outpatient Prescriptions   Medication     alfuzosin (UROXATRAL) 10 MG 24 hr tablet     furosemide (LASIX) 20 MG tablet     NADOLOL PO     omeprazole  "(PRILOSEC) 20 MG CR capsule     rifampin (RIFADIN) 300 MG capsule     spironolactone (ALDACTONE) 25 MG tablet     XIFAXAN 550 MG TABS tablet     [DISCONTINUED] furosemide (LASIX) 20 MG tablet     [DISCONTINUED] spironolactone (ALDACTONE) 25 MG tablet     No current facility-administered medications for this visit.          PHYSICAL EXAM:    /61  Pulse 51  Ht 1.702 m (5' 7\")  Wt 80.9 kg (178 lb 6.4 oz)  BMI 27.94 kg/m2    HEENT: Normocephalic and atraumatic   Cardiac: Not done  Back/Flank: Not done  CNS/PNS: Not done  Respiratory: Normal non-labored breathing  Abdomen: Soft nontender and nondistended  Peripheral Vascular: Not done  Mental Status: Not done    Penis: Not done  Scrotal Skin: Not done  Testicles: Not done  Epididymis: Not done  Digital Rectal Exam:     Cystoscopy: Not done    Imaging: None    Urinalysis: UA RESULTS:  Recent Labs   Lab Test  07/19/18   1133   COLOR  Yellow   APPEARANCE  Clear   URINEGLC  Negative   URINEBILI  Negative   URINEKETONE  Negative   SG  1.009   UBLD  Negative   URINEPH  8.0*   PROTEIN  Negative   NITRITE  Negative   LEUKEST  Negative       PSA: 3.27    Post Void Residual: 15ml    Other labs: None today      IMPRESSION:  64 y/o M with lower urinary tract symptoms now improved with alpha blocker     PLAN:  Continue alfuzosin   Patient cleared from  stand point for liver transplant, should keep taking alfuzosin   Uroflow/PVR in six months   Follow up in six months     Total Time: 15 minutes                                       Total in Consultation: greater than 50%       Again, thank you for allowing me to participate in the care of your patient.      Sincerely,    Khloe Torres MD      "

## 2018-09-05 ENCOUNTER — TELEPHONE (OUTPATIENT)
Dept: TRANSPLANT | Facility: CLINIC | Age: 63
End: 2018-09-05

## 2018-09-05 NOTE — TELEPHONE ENCOUNTER
Transplant Social Work Services Phone Call      Data: Received JESSEE from Susanne Liu at [facility] and a call from her to obtain collateral information.  Intervention: I provided collateral information, including EtG test information from 2018 (3 negative test results).  Assessment: Susanne reports she is recommending outpatient treatment for Claribel and she will refer him to a program at [facility].  Plan: Susanne will fax me patient's entire chemical dependency evaluation.  I will follow patient's progress with treatment.      ABRAHAM Gaming, [facility]  Liver Transplant   Phone [phone]  Pager [phone]     Addendum on 9/12/18: Received patient's chemical dependency evaluation and sent copy to [facility].  Called patient's daughter Kaylene but was unable to reach her.  I left her a voice message and asked that she contact me to confirm if patient is intending to begin outpatient chemical dependency treatment program at [facility].    Addendum on 9/25/18: I attempted to reach patient's daughter Kaylene but was unable.  I left her a voice message requesting an update on patient's chemical dependency treatment plan and I provided my contact and availability.    Addendum on 10/3/18: Left a voice message for Susanne Liu at [facility] to determine if patient has started treatment ([phone]).      Addendum on 10/4/18: Received voice message back from Susanne Liu at [facility] and she reports patient completed his intake yesterday for their 13 week outpatient treatment program.      Addendun on 10/9/18: Called patient with a .  Claribel confirms he will start chemical dependency treatment tomorrow at [facility], and attend four individual and group sessions each week for thirteen weeks.  He reports commitment to following the recommendations of the liver transplant team.  I will reach out to him in 6-8 weeks to determine his progress.

## 2018-09-17 ENCOUNTER — TELEPHONE (OUTPATIENT)
Dept: INFECTIOUS DISEASES | Facility: CLINIC | Age: 63
End: 2018-09-17

## 2018-09-17 NOTE — TELEPHONE ENCOUNTER
Patient contacted and reminded of upcoming appointment.  Patient confirmed they will be attending.  Patient instructed to bring updated medications list to appointment.    Rina Hubbard CMA

## 2018-09-18 DIAGNOSIS — R94.5 ABNORMAL RESULTS OF LIVER FUNCTION STUDIES: ICD-10-CM

## 2018-09-18 DIAGNOSIS — C22.0 LIVER CELL CARCINOMA (H): ICD-10-CM

## 2018-09-18 LAB
AFP SERPL-MCNC: 4.4 UG/L (ref 0–8)
ALBUMIN SERPL-MCNC: 2.5 G/DL (ref 3.4–5)
ALP SERPL-CCNC: 253 U/L (ref 40–150)
ALT SERPL W P-5'-P-CCNC: 37 U/L (ref 0–70)
ANION GAP SERPL CALCULATED.3IONS-SCNC: 6 MMOL/L (ref 3–14)
AST SERPL W P-5'-P-CCNC: 46 U/L (ref 0–45)
BILIRUB DIRECT SERPL-MCNC: 0.9 MG/DL (ref 0–0.2)
BILIRUB SERPL-MCNC: 1.5 MG/DL (ref 0.2–1.3)
BUN SERPL-MCNC: 8 MG/DL (ref 7–30)
CALCIUM SERPL-MCNC: 8 MG/DL (ref 8.5–10.1)
CHLORIDE SERPL-SCNC: 108 MMOL/L (ref 94–109)
CO2 SERPL-SCNC: 26 MMOL/L (ref 20–32)
CREAT SERPL-MCNC: 0.48 MG/DL (ref 0.66–1.25)
GFR SERPL CREATININE-BSD FRML MDRD: >90 ML/MIN/1.7M2
GLUCOSE SERPL-MCNC: 178 MG/DL (ref 70–99)
INR PPP: 1.95 (ref 0.86–1.14)
POTASSIUM SERPL-SCNC: 4.1 MMOL/L (ref 3.4–5.3)
PROT SERPL-MCNC: 6.8 G/DL (ref 6.8–8.8)
SODIUM SERPL-SCNC: 139 MMOL/L (ref 133–144)

## 2018-09-18 PROCEDURE — 82105 ALPHA-FETOPROTEIN SERUM: CPT | Performed by: RADIOLOGY

## 2018-09-25 ENCOUNTER — COMMITTEE REVIEW (OUTPATIENT)
Dept: TRANSPLANT | Facility: CLINIC | Age: 63
End: 2018-09-25

## 2018-09-25 NOTE — COMMITTEE REVIEW
Abdominal Committee Review Note     Evaluation Date: 5/29/2018  Committee Review Date: 9/25/2018    Organ being evaluated for: Liver    Transplant Phase: Evaluation  Transplant Status: Active    Transplant Coordinator: Noelle Carlton  Transplant Surgeon:   Swathi    Referring Physician: David Champagne    Primary Diagnosis: HCC, ETOH Cirrhosis  Secondary Diagnosis:     Committee Review Members:  Cardiology Joyce Howard MD   Nurse Practitioner Nina Gallegos, NP   Nutrition Kathrin Thacker, RD   Pharmacy Kenrick Benitez, [facility]    - Clinical ABRAHAM Rios, Asmita Engel, [facility]   Transplant Leena Colvin MD, Ed Juarez MD, Jr Gerhard Freeman, [place], Kamla England MD, Camille Patel, APRN CNP, Becky Cruz, [place], Tamela Carballo MD, Xander Nunes MD, Noelle Carlton, [place], Emil Echevarria MD, Shaila De La Cruz MD       Transplant Eligibility: Cirrhosis with MELD    Committee Review Decision: Approved    Relative Contraindications: None    Absolute Contraindications: None    Committee Chair Leena Colvin MD verbally attested to the committee's decision.    Committee Discussion Details:     Approved for listing pending  approval.

## 2018-10-01 ENCOUNTER — DOCUMENTATION ONLY (OUTPATIENT)
Dept: TRANSPLANT | Facility: CLINIC | Age: 63
End: 2018-10-01

## 2018-10-01 NOTE — PROGRESS NOTES
7/11/18 images  1.) no HCC  2.) treatment changes  3.) OPTN 3 &4's but no HCC    Recs:  - 3 month follow up images (scheduled for 10/11/18)

## 2018-10-03 ENCOUNTER — RADIANT APPOINTMENT (OUTPATIENT)
Dept: MRI IMAGING | Facility: CLINIC | Age: 63
End: 2018-10-03
Attending: INTERNAL MEDICINE
Payer: MEDICARE

## 2018-10-03 ENCOUNTER — RADIANT APPOINTMENT (OUTPATIENT)
Dept: CT IMAGING | Facility: CLINIC | Age: 63
End: 2018-10-03
Attending: INTERNAL MEDICINE
Payer: MEDICARE

## 2018-10-03 DIAGNOSIS — R91.8 PULMONARY NODULES: ICD-10-CM

## 2018-10-03 DIAGNOSIS — K74.60 CIRRHOSIS (H): ICD-10-CM

## 2018-10-03 LAB
AFP SERPL-MCNC: 5.9 UG/L (ref 0–8)
BASOPHILS # BLD AUTO: 0 10E9/L (ref 0–0.2)
BASOPHILS NFR BLD AUTO: 0.4 %
DIFFERENTIAL METHOD BLD: ABNORMAL
EOSINOPHIL # BLD AUTO: 0.1 10E9/L (ref 0–0.7)
EOSINOPHIL NFR BLD AUTO: 4.6 %
ERYTHROCYTE [DISTWIDTH] IN BLOOD BY AUTOMATED COUNT: 13.3 % (ref 10–15)
HCT VFR BLD AUTO: 32.9 % (ref 40–53)
HGB BLD-MCNC: 11.1 G/DL (ref 13.3–17.7)
IMM GRANULOCYTES # BLD: 0 10E9/L (ref 0–0.4)
IMM GRANULOCYTES NFR BLD: 0.4 %
LYMPHOCYTES # BLD AUTO: 0.9 10E9/L (ref 0.8–5.3)
LYMPHOCYTES NFR BLD AUTO: 32.3 %
MCH RBC QN AUTO: 33 PG (ref 26.5–33)
MCHC RBC AUTO-ENTMCNC: 33.7 G/DL (ref 31.5–36.5)
MCV RBC AUTO: 98 FL (ref 78–100)
MONOCYTES # BLD AUTO: 0.3 10E9/L (ref 0–1.3)
MONOCYTES NFR BLD AUTO: 11 %
NEUTROPHILS # BLD AUTO: 1.4 10E9/L (ref 1.6–8.3)
NEUTROPHILS NFR BLD AUTO: 51.3 %
NRBC # BLD AUTO: 0 10*3/UL
NRBC BLD AUTO-RTO: 0 /100
PLATELET # BLD AUTO: 95 10E9/L (ref 150–450)
RBC # BLD AUTO: 3.36 10E12/L (ref 4.4–5.9)
WBC # BLD AUTO: 2.6 10E9/L (ref 4–11)

## 2018-10-03 PROCEDURE — 82105 ALPHA-FETOPROTEIN SERUM: CPT | Performed by: INTERNAL MEDICINE

## 2018-10-03 RX ORDER — GADOBUTROL 604.72 MG/ML
10 INJECTION INTRAVENOUS ONCE
Status: COMPLETED | OUTPATIENT
Start: 2018-10-03 | End: 2018-10-03

## 2018-10-03 RX ADMIN — GADOBUTROL 9 ML: 604.72 INJECTION INTRAVENOUS at 08:40

## 2018-10-03 NOTE — DISCHARGE INSTRUCTIONS
MRI Contrast Discharge Instructions    The IV contrast you received today will pass out of your body in your  urine. This will happen in the next 24 hours. You will not feel this process.  Your urine will not change color.    Drink at least 4 extra glasses of water or juice today (unless your doctor  has restricted your fluids). This reduces the stress on your kidneys.  You may take your regular medicines.    If you are on dialysis: It is best to have dialysis today.    If you have a reaction: Most reactions happen right away. If you have  any new symptoms after leaving the hospital (such as hives or swelling),  call your hospital at the correct number below. Or call your family doctor.  If you have breathing distress or wheezing, call 911.    Special instructions: ***    I have read and understand the above information.    Signature:______________________________________ Date:___________    Staff:__________________________________________ Date:___________     Time:__________    [place] [facility] Departments:    ___[place]: [phone]  ___[facility]: [phone]  ___[place]: [phone] ___[facility]: [phone]  ___[facility]: [phone]  ___[facility]: [phone]  ___[phone]  ___[facility]: [phone]  ___[phone]

## 2018-10-04 ENCOUNTER — ONCOLOGY VISIT (OUTPATIENT)
Dept: ONCOLOGY | Facility: CLINIC | Age: 63
End: 2018-10-04
Attending: INTERNAL MEDICINE
Payer: MEDICARE

## 2018-10-04 VITALS
HEART RATE: 59 BPM | RESPIRATION RATE: 18 BRPM | WEIGHT: 185 LBS | BODY MASS INDEX: 29.03 KG/M2 | TEMPERATURE: 98.2 F | DIASTOLIC BLOOD PRESSURE: 75 MMHG | HEIGHT: 67 IN | SYSTOLIC BLOOD PRESSURE: 144 MMHG | OXYGEN SATURATION: 99 %

## 2018-10-04 DIAGNOSIS — C22.0 HCC (HEPATOCELLULAR CARCINOMA) (H): ICD-10-CM

## 2018-10-04 DIAGNOSIS — R13.10 DYSPHAGIA, UNSPECIFIED TYPE: Primary | ICD-10-CM

## 2018-10-04 PROCEDURE — G0463 HOSPITAL OUTPT CLINIC VISIT: HCPCS | Mod: ZF

## 2018-10-04 PROCEDURE — 92610 EVALUATE SWALLOWING FUNCTION: CPT | Mod: GN,ZF | Performed by: INTERNAL MEDICINE

## 2018-10-04 PROCEDURE — 99214 OFFICE O/P EST MOD 30 MIN: CPT | Mod: ZP | Performed by: INTERNAL MEDICINE

## 2018-10-04 PROCEDURE — T1013 SIGN LANG/ORAL INTERPRETER: HCPCS | Mod: U3,ZF

## 2018-10-04 ASSESSMENT — PAIN SCALES - GENERAL: PAINLEVEL: WORST PAIN (10)

## 2018-10-04 NOTE — MR AVS SNAPSHOT
After Visit Summary   10/4/2018    Loy Limon    MRN: [id number]           Patient Information     Date Of Birth          1955        Visit Information        Provider Department      10/4/2018 3:45 PM Dana Ames Aazim K, MD [facility]        Today's Diagnoses     Dysphagia, unspecified type    -  1    HCC (hepatocellular carcinoma) (H)          Care Instructions    Refer to swallow evaluation    Follow with PCP and Dr Carballo    In 3 months, repeat labs and MRI and see me a few days after that          Follow-ups after your visit        Your next 10 appointments already scheduled     Oct 12, 2018  9:00 AM CDT   (Arrive by 8:45 AM)   Return Visit with Debora Ac MD   [facility] ([facility])    [address] [place] [address]  [address]  [facility] [address] [phone]            Oct 24, 2018  9:20 AM CDT   (Arrive by 9:05 AM)   New Patient Visit with Jaswinder Anne MD   [facility] ([facility])    [address] [place] [address]  4th Floor  [facility] [address]   [phone]            Nov 14, 2018  8:00 AM CST   Lab with UC LAB   [facility] ([facility])    [address] [place] [address]  1st Floor  [facility] [address]   [phone]            Nov 14, 2018  9:00 AM CST   (Arrive by 8:45 AM)   Return General Liver with Soledad Skelton PA-C   [facility] ([facility])    [address] [place] [address]  [address]  [facility] [address]   [phone]            Dec 04, 2018  2:00 PM CST   (Arrive by 1:45 PM)   Return Visit with Pauline Clancy MD   [facility] ([facility])    [address] [place] [address]  [address]  [facility] [address] [phone]            Jan 02, 2019  6:45 AM CST   Lab with UC LAB   [facility] ([facility])    [address] [address]   [phone]            Jan 02, 2019  7:00 AM CST   MR ABDOMEN W/O & W CONTRAST with [address] MRI ([facility])    [address] [address]   [phone]           How do I prepare for my exam? (Food and drink instructions) Do not eat or drink for 6 hours prior to exam. **If you will be receiving sedation or general anesthesia, please see special notes below.**  How do I prepare for my exam? (Other instructions) Take your medicines as usual, unless your doctor tells you not to. You may or may not receive IV contrast for this exam pending the discretion of the Radiologist.  **If you will be receiving sedation or general anesthesia, please see special notes below.**  What should I wear: The MRI machine uses a strong magnet. Please wear clothes without metal (snaps, zippers). A sweatsuit works well, or we may give you a hospital gown. Please remove any body piercings and hair extensions before you arrive. You will also remove watches, jewelry, hairpins, wallets, dentures, partial dental plates and hearing aids. You may wear contact lenses, and you may be able to wear your rings. We have a safe place to keep your personal items, but it is safer to leave them at home.  How long does the exam take: Most tests take 30 to 60 minutes.  HOWEVER, IF YOUR DOCTOR PRESCRIBES ANESTHESIA please plan on spending four to five hours in the recovery room.  What should I bring: Bring a list of your current medicines to your exam (including vitamins, minerals and over-the-counter drugs). Also bring the results of similar scans you may have had.  Do I need a : **If you will be receiving sedation or general anesthesia, please see special notes below.**  What should I do after the exam: No Restrictions, You may resume normal activities.  What is this test: MRI (magnetic resonance imaging) uses a  strong magnet and radio waves to look inside the body. An MRA (magnetic resonance angiogram) does the same thing, but it lets us look at your blood vessels. A computer turns the radio waves into pictures showing cross sections of the body, much like slices of bread. This helps us see any problems more clearly. You may receive fluid (called  contrast ) before or during your scan. The fluid helps us see the pictures better. We give the fluid through an IV (small needle in your arm).  Who should I call with questions: If you have any questions, please contact your Imaging Department exam site. Directions, parking instructions, and other information is available on our website, [email or web address].            Ziyad 10, 2019  3:30 PM CST   (Arrive by 3:15 PM)   Return Visit with Hi Melgar MD   [facility] ([facility])    [address]  [address]  [facility] [id number] [phone]            Mar 01, 2019 10:30 AM CST   (Arrive by 10:15 AM)   Return Visit with Khloe Torres MD   [facility] ([facility])    [address]  4th [address]  [facility] [address] [phone]              Future tests that were ordered for you today     Open Future Orders        Priority Expected Expires Ordered    *CBC with platelets differential Routine 1/4/2019 10/4/2019 10/4/2018    Comprehensive metabolic panel Routine 1/4/2019 10/4/2019 10/4/2018    AFP tumor marker Routine 1/4/2019 10/4/2019 10/4/2018    MRI Abdomen w & w/o contrast Routine 1/4/2019 10/4/2019 10/4/2018            Who to contact     If you have questions or need follow up information about today's clinic visit or your schedule please contact [facility] directly at [phone].  Normal or non-critical lab and imaging results will be communicated to you by MyChart, letter or phone within 4 business days  "after the clinic has received the results. If you do not hear from us within 7 days, please contact the clinic through [employer] or phone. If you have a critical or abnormal lab result, we will notify you by phone as soon as possible.  Submit refill requests through [employer] or call your pharmacy and they will forward the refill request to us. Please allow 3 business days for your refill to be completed.          Additional Information About Your Visit        Care EveryWhere ID     This is your Care EveryWhere ID. This could be used by other organizations to access your [place] medical records  [id number]        Your Vitals Were     Pulse Temperature Respirations Height Pulse Oximetry BMI (Body Mass Index)    59 98.2  F (36.8  C) (Tympanic) 18 1.702 m (5' 7.01\") 99% 28.97 kg/m2       Blood Pressure from Last 3 Encounters:   10/04/18 144/75   09/18/18 129/63   08/24/18 116/61    Weight from Last 3 Encounters:   10/04/18 83.9 kg (185 lb)   09/18/18 85.6 kg (188 lb 11.2 oz)   08/24/18 80.9 kg (178 lb 6.4 oz)              We Performed the Following     HC SP SWALLOWING EVALUATION       Information about OPIOIDS     PRESCRIPTION OPIOIDS: WHAT YOU NEED TO KNOW   We gave you an opioid (narcotic) pain medicine. It is important to manage your pain, but opioids are not always the best choice. You should first try all the other options your care team gave you. Take this medicine for as short a time (and as few doses) as possible.    Some activities can increase your pain, such as bandage changes or therapy sessions. It may help to take your pain medicine 30 to 60 minutes before these activities. Reduce your stress by getting enough sleep, working on hobbies you enjoy and practicing relaxation or meditation. Talk to your care team about ways to manage your pain beyond prescription opioids.    These medicines have risks:    DO NOT drive when on new or higher doses of pain medicine. These medicines can affect your alertness and " reaction times, and you could be arrested for driving under the influence (DUI). If you need to use opioids long-term, talk to your care team about driving.    DO NOT operate heavy machinery    DO NOT do any other dangerous activities while taking these medicines.    DO NOT drink any alcohol while taking these medicines.     If the opioid prescribed includes acetaminophen, DO NOT take with any other medicines that contain acetaminophen. Read all labels carefully. Look for the word  acetaminophen  or  Tylenol.  Ask your pharmacist if you have questions or are unsure.    You can get addicted to pain medicines, especially if you have a history of addiction (chemical, alcohol or substance dependence). Talk to your care team about ways to reduce this risk.    All opioids tend to cause constipation. Drink plenty of water and eat foods that have a lot of fiber, such as fruits, vegetables, prune juice, apple juice and high-fiber cereal. Take a laxative (Miralax, milk of magnesia, Colace, Senna) if you don t move your bowels at least every other day. Other side effects include upset stomach, sleepiness, dizziness, throwing up, tolerance (needing more of the medicine to have the same effect), physical dependence and slowed breathing.    Store your pills in a secure place, locked if possible. We will not replace any lost or stolen medicine. If you don t finish your medicine, please throw away (dispose) as directed by your pharmacist. The [place] [employer]ution [employer] has more information about safe disposal: [email or web address]         Primary Care Provider    None Specified       No primary provider on file.        Equal Access to Services     DAVID COWAN : Wyatt Pittman, karin cheema, qaybjavier hisnon . So [facility] [phone].    ATENCIÓN: Si habla español, tiene a samaniego disposición servicios gratuitos de  asistencia lingüística. Earle al [phone].    We comply with applicable federal civil rights laws and [place] laws. We do not discriminate on the basis of race, color, national origin, age, disability, sex, sexual orientation, or gender identity.            Thank you!     Thank you for choosing [facility]  for your care. Our goal is always to provide you with excellent care. Hearing back from our patients is one way we can continue to improve our services. Please take a few minutes to complete the written survey that you may receive in the mail after your visit with us. Thank you!             Your Updated Medication List - Protect others around you: Learn how to safely use, store and throw away your medicines at www.disposemymeds.org.          This list is accurate as of 10/4/18  5:12 PM.  Always use your most recent med list.                   Brand Name Dispense Instructions for use Diagnosis    alfuzosin 10 MG 24 hr tablet    UROXATRAL     Take 10 mg by mouth daily    Alcoholic cirrhosis of liver without ascites (H), HCC (hepatocellular carcinoma) (H)       furosemide 20 MG tablet    LASIX    60 tablet    Take 1 tablet (20 mg) by mouth daily    Alcoholic cirrhosis of liver without ascites (H), HCC (hepatocellular carcinoma) (H), Edema, unspecified type       NADOLOL PO      Take 20 mg by mouth daily        omeprazole 20 MG CR capsule    priLOSEC     Take 20 mg by mouth        rifampin 300 MG capsule    RIFADIN    60 capsule    Take 2 capsules (600 mg) by mouth daily    Latent tuberculosis infection       spironolactone 25 MG tablet    ALDACTONE    60 tablet    Take 2 tablets (50 mg) by mouth daily    Alcoholic cirrhosis of liver without ascites (H), HCC (hepatocellular carcinoma) (H), Edema, unspecified type       TRAMADOL HCL PO      Take 50 mg by mouth as needed for moderate to severe pain        XIFAXAN 550 MG Tabs tablet   Generic drug:  rifaximin

## 2018-10-04 NOTE — PROGRESS NOTES
Oncology follow up visit:  Date on this visit: 10/4/2018     HCC    Primary Physician: [facility], Oncology     History Of Present Illness:      Please see previous note for details. I have copied and updated from prior note      Mr. Limon is a 63 year old male who has been a chronic heavy alcohol drinker presented with right abdominal pain in March 2018 and workup including a CT scan showed cirrhosis, portal hypertension and 2 hepatic lesions in the right hepatic lobe which were concerning for hepatocellular carcinoma.  He had MRI on 3/16/2018 which confirmed cirrhosis and portal hypertension and splenomegaly. 3.7 cm segment 8 lesion was OPTN 5B.  There was an antecedent 0.9 cm satellite nodule in hepatic segment 8 consistent with LI-RADS 4.  In segment 7, 1.5 cm LIRADS 4 lesion was seen  Another 1.9 cm segment 7 lesion was seen which was indeterminate.  Several other LIRADS 3 lesions were scattered.  Alpha-fetoprotein was not elevated.  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 Hepatitis B surface antibody was reactive consistent with effective vaccination. Hepatitis C antibody was nonreactive.  He has had no ascites. He has had mild hepatic and Neuropathy but was unable to tolerate lactulose because of abdominal discomfort. He has no history of variceal bleeding although he has grade 2 esophageal varices which have not been banded and he continues to be on propranolol.    He had repeat MRI done on 5/24/2018 which showed stable to slight increase in size of the segment 8 lesion.  Segment 7 lesion was consistent with LIRADS 4 lesion  Another segment 7 lesion is also consistent with LIRADS 4 lesion  CT of the chest showed a 4 mm solitary pulmonary nodule in the right lower lobe which remains indeterminate. There was no other evidence of metastasis.  Bone scan was negative for any evidence of metastatic disease.    He had chemoembolization of the S8  lesion on 6/13/18.    As there was residual tumor left, he had microwave ablation of residual mass in hepatic segment 8 on 7/2/2018.     S7 IRADS 4 lesions were not treated    Repeat MRI on 10/3/18 shows no residual viable tumor in S8 lesion  S7 LIRADS 4 lesion was less well defined.  He has some scattered LIRADS 3 lesions    Overall he is feeling about the same as before.  Denies any significant abdominal pain.  No nausea or vomiting.  No diarrhea or constipation.  No infections.  He tells me that he has noticed that sometimes when he swallows he chokes and has a coughing episode.  Denies any difficulty breathing per se.  No bleeding.  No new swellings but continues to have lower extremity edema.  Continues to have chronic low back pain.  His last alcohol drink was in February 2018    ECOG 1    ROS:  Rest of the comprehensive review of the system was essentially unremarkable.    I reviewed other history in epic as below.    Past Medical/Surgical History:  Past Medical History:   Diagnosis Date     Cancer (H)     hepatocellualr carcinoma     Cirrhosis of liver (H) 5/8/2018     Enlarged prostate      Inguinal hernia      Liver lesion 5/8/2018     Past Surgical History:   Procedure Laterality Date     APPENDECTOMY       COLONOSCOPY      2018     Cancer History:   As above    Allergies:  Allergies as of 10/04/2018     (No Known Allergies)     Current Medications:  Current Outpatient Prescriptions   Medication Sig Dispense Refill     alfuzosin (UROXATRAL) 10 MG 24 hr tablet Take 10 mg by mouth daily       NADOLOL PO Take 20 mg by mouth daily       omeprazole (PRILOSEC) 20 MG CR capsule Take 20 mg by mouth       rifampin (RIFADIN) 300 MG capsule Take 2 capsules (600 mg) by mouth daily 60 capsule 3     spironolactone (ALDACTONE) 25 MG tablet Take 2 tablets (50 mg) by mouth daily 60 tablet 3     TRAMADOL HCL PO Take 50 mg by mouth as needed for moderate to severe pain       XIFAXAN 550 MG TABS tablet        furosemide  "(LASIX) 20 MG tablet Take 1 tablet (20 mg) by mouth daily (Patient not taking: Reported on 2018) 60 tablet 3      Family History:  Family History   Problem Relation Age of Onset     Liver Disease Brother       Maternal grand mother had Uterus cancer at 63  He has 3 living children. One son with schizophrenia  One daughter dies of leukemia at 8 years of age  Son  shortly after birth. He had some brain congenital anomaly    Social History:  Social History     Social History     Marital status:      Spouse name: N/A     Number of children: N/A     Years of education: N/A     Occupational History     Not on file.     Social History Main Topics     Smoking status: Former Smoker     Packs/day: 0.03     Years: 20.00     Types: Cigarettes, Cigars     Start date: 1970     Quit date: 3/14/2018     Smokeless tobacco: Never Used      Comment: Quit smoking 2018, one cigarette after dinner     Alcohol use No      Comment: Quit drinking 2018     Drug use: No     Sexual activity: Not on file     Other Topics Concern     Not on file     Social History Narrative    Born in [place], came to [place] years ago.     Has worked in [employer], trimming meats   smoker for 25 years. One pack for 1 week. Quit in 2018.  Has been a heavy drinker for 30 years, beer about 36 every week. Last drink was around 2018.  No drug use.  Lives with wife. 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  Physical Exam:  /75 (BP Location: Left arm, Patient Position: Sitting, Cuff Size: Adult Regular)  Pulse 59  Temp 98.2  F (36.8  C) (Tympanic)  Resp 18  Ht 1.702 m (5' 7.01\")  Wt 83.9 kg (185 lb)  SpO2 99%  BMI 28.97 kg/m2  CONSTITUTIONAL: No apparent distress  EYES: PERRLA, without pallor or jaundice  ENT/MOUTH: Ears unremarkable. No oral lesions  CVS: s1s2 normal  RESPIRATORY: Chest is clear  GI: Abdomen is benign with liver edge palpable below costal margin.  NEURO: Alert and " oriented ×3  INTEGUMENT: no concerning skin rashes   LYMPHATIC: no palpable lymphadenopathy  MUSCULOSKELETAL: Unremarkable. No bony tenderness.   EXTREMITIES: Trace pedal edema  PSYCH: Mentation, mood and affect are appropriate      Laboratory/Imaging Studies      Results for orders placed or performed in visit on 10/03/18   *CBC with platelets differential   Result Value Ref Range    WBC 2.6 (L) 4.0 - 11.0 10e9/L    RBC Count 3.36 (L) 4.4 - 5.9 10e12/L    Hemoglobin 11.1 (L) 13.3 - 17.7 g/dL    Hematocrit 32.9 (L) 40.0 - 53.0 %    MCV 98 78 - 100 fl    MCH 33.0 26.5 - 33.0 pg    MCHC 33.7 31.5 - 36.5 g/dL    RDW 13.3 10.0 - 15.0 %    Platelet Count 95 (L) 150 - 450 10e9/L    Diff Method Automated Method     % Neutrophils 51.3 %    % Lymphocytes 32.3 %    % Monocytes 11.0 %    % Eosinophils 4.6 %    % Basophils 0.4 %    % Immature Granulocytes 0.4 %    Nucleated RBCs 0 0 /100    Absolute Neutrophil 1.4 (L) 1.6 - 8.3 10e9/L    Absolute Lymphocytes 0.9 0.8 - 5.3 10e9/L    Absolute Monocytes 0.3 0.0 - 1.3 10e9/L    Absolute Eosinophils 0.1 0.0 - 0.7 10e9/L    Absolute Basophils 0.0 0.0 - 0.2 10e9/L    Abs Immature Granulocytes 0.0 0 - 0.4 10e9/L    Absolute Nucleated RBC 0.0    AFP tumor marker   Result Value Ref Range    Alpha Fetoprotein 5.9 0 - 8 ug/L   Results for DONNA BIGGS (MRN [id number]) as of 10/4/2018 12:38   Ref. Range 9/18/2018 15:28   Sodium Latest Ref Range: 133 - 144 mmol/L 139   Potassium Latest Ref Range: 3.4 - 5.3 mmol/L 4.1   Chloride Latest Ref Range: 94 - 109 mmol/L 108   Carbon Dioxide Latest Ref Range: 20 - 32 mmol/L 26   Urea Nitrogen Latest Ref Range: 7 - 30 mg/dL 7   Creatinine Latest Ref Range: 0.66 - 1.25 mg/dL 0.51 (L)   GFR Estimate Latest Ref Range: >60 mL/min/1.7m2 >90   GFR Estimate If Black Latest Ref Range: >60 mL/min/1.7m2 >90   Calcium Latest Ref Range: 8.5 - 10.1 mg/dL 8.0 (L)   Anion Gap Latest Ref Range: 3 - 14 mmol/L 5   Albumin Latest Ref Range: 3.4 - 5.0 g/dL 2.4 (L)    Protein Total Latest Ref Range: 6.8 - 8.8 g/dL 6.8   Bilirubin Total Latest Ref Range: 0.2 - 1.3 mg/dL 1.5 (H)   Alkaline Phosphatase Latest Ref Range: 40 - 150 U/L 253 (H)   ALT Latest Ref Range: 0 - 70 U/L 38   AST Latest Ref Range: 0 - 45 U/L 45   Glucose Latest Ref Range: 70 - 99 mg/dL 178 (H)   Results for DONNA BIGGS (MRN [id number]) as of 10/4/2018 12:38   Ref. Range 6/18/2018 07:47 7/19/2018 11:32 8/21/2018 10:50 9/18/2018 15:24 10/3/2018 09:23   Alpha Fetoprotein Latest Ref Range: 0 - 8 ug/L 3.8 4.9 4.5 4.4 5.9     MRI ABDOMEN 10/30/18     CLINICAL HISTORY: follow up HCC; Cirrhosis (H); Pulmonary nodules     Images were acquired with and without intravenous gadolinium contrast  through the upper abdomen. The following MR images were acquired  without intravenous contrast: TrueFISP, multiplanar T2-weighted, axial  T1 in/out of phase, T2-weighted MRCP images, axial diffusion-weighted  and axial apparent diffusion coefficient. T1-weighted images were  obtained before contrast at the multiple time points following  contrast injection. 3-D reformatted images were generated by the  technologist.     Contrast dose: 9.0mL Gadavist     FINDINGS:     Comparison study: MRI on 7/11/2018, CT on 7/2/2018 microwave ablation     Liver: Redemonstration of cirrhotic with lacelike T2 hyperintensity  throughout the hepatic parenchyma, and diffuse siderotic nodules  throughout the liver and contour nodularity.     Post chemoembolization and ablation changes in hepatic segment 8.  Intrinsic T1 hyperintensity compatible treatment related change and  involving blood products. Expected thin rim of enhancement without  suspicious enhancing nodularity or washout, similar to prior. LI-RADS  TR nonviable.     Lesion 1: There is a 13 mm arterially enhancing lesion in hepatic  segment  8, adjacent to the gallbladder fossa (series 8, image 24). It  does not washout on portal venous or delayed phase imaging.  Pseudocapsule is not  identified. There is no associated increased T2  signal.  There is no increased signal on diffusion weighted images.  Previously this measured 12 mm on MRI dated 7/11/2018. It does not  meet the criteria for threshold growth.  There there is not evidence  of venous invasion.  OPTN/LIRADS class 3.     Lesion 2: There is a 17 x 24 mm subcapsular region which is isointense  during the arterial phase and shows persistent enhancement during the  more delayed phases in hepatic segment  7 (series 11, image 19). It  does not washout on portal venous or delayed phase imaging.  Pseudocapsule is not identified. There is intermediate increased T2  signal. There is no increased signal on diffusion weighted images.  Previously this measured 17 x 25 cm. This was better defined on prior  imaging. It does not meet criteria for threshold growth There is not  evidence of venous invasion.  OPTN/LIRADS class 4     Lesion 3: There is a 13 x 21 mm with T1 hyperintense lesion without  convincing arterial enhancement lesion in hepatic segment  7 located  immediately adjacent to above lesion #2 (series 8, image 22). It does  not definitively washout on portal venous or delayed phase imaging. No  pseudocapsule formation.There is no associated increased T2 signal.   There is no increased signal on diffusion weighted images. There is  not evidence of venous invasion.  OPTN/LIRADS class 3     Lesion 4: There is a 11 mm arterially enhancing lesion in hepatic  segment  6 (series 8, image 42) It was not washout on portal venous or  delayed phase imaging. Pseudocapsule is not identified. There is no  associated increased T2 signal.  There is no increased signal on  diffusion weighted images. Previously this measured 10 mm. It does not  meet criteria for threshold growth There is not evidence of venous  invasion.  OPTN/LIRADS class 3     Lesion 5: There is a 7 mm arterially enhancing subcapsular lesion in  hepatic segment 5/6 (series 8 and image 46).  It does not washout on  portal venous or delayed phase imaging, demonstrates progressive  enhancement on the delayed images. There is associated increased T2  signal (series 19, image 27).  There is no increased signal on  diffusion weighted images. Previously this measured 7 mm.There is not  evidence of venous invasion.  OPTN/LIRADS class 3     Gallbladder: Distended gallbladder with no appreciable gallstones.     Spleen: Splenomegaly measuring up to 15.8 cm in craniocaudal  dimension.     Kidneys: Within normal limits.     Adrenal glands: Within normal limits.     Pancreas: Normal T1 signal of the pancreas. No pancreatic ductal  dilatation.     Bowel: No dilated loops of small bowel or colon. Scattered colonic  diverticulosis with evidence of diverticulitis.     Lymph nodes: Stable prominent gabrielle hepatis lymph nodes.     Blood vessels: Major intra-abdominal vessels are patent. Splenorenal  collateral vessel formation.     Lung bases: Relatively clear.     Bones and soft tissues: Unremarkable.     Mesentery and abdominal wall: Within normal limits.     Ascites: Trace amount of free fluid in the abdomen.         IMPRESSION:    1. Hepatic cirrhosis and evidence of portal hypertension including  splenomegaly, small volume ascites, and portosystemic collateral  vessel formation.  2. Chemoembolization and microwave ablation changes in hepatic segment  8 without evidence of residual viable tumor. LR TR nonviable.  3. Subtle LIRADS 4 lesion in segment 7, less well-defined than on the  prior exams.  As before, this is located immediately adjacent to  appears to be a prominent dysplastic nodule.  4. Additional indeterminate LIRADS 3 lesions as above  5. Based on this exam only, the patient is within  Antonio criteria.       Exam: CT Chest without contrast 10/3/2018 10:05 AM      History: follow up for lung nodule  hx of HCC; Pulmonary nodules;  Cirrhosis (H)     Comparison: Cardiac CT 7/25/2018, chest CT  5/24/2018.     TECHNIQUE: Helical acquisition of CT images from the lung apices to  the kidneys without IV contrast. Multiplanar images and axial MIP  images were reconstructed from the source data.     FINDINGS:      Visualized thyroid is unremarkable. Heart size is normal, without  pericardial effusion. Moderate coronary artery and mitral annular  calcifications. Aorta and main pulmonary artery are normal in caliber.  Normal aortic branching pattern. No mediastinal, hilar, or axillary  lymphadenopathy.     The central tracheobronchial tree is patent. No pleural effusion or  pneumothorax. No pulmonary consolidation. Subpleural reticulation in  the periphery of the lung bases is unchanged compared to 5/24/2018.  Left lower lobe calcified granuloma. Resolution of previous scattered  groundglass opacities primarily involving the right lower lobe  compared to 7/25/2018. No suspicious, new, or enlarging noncalcified  solid pulmonary nodules.     Limited evaluation of the upper abdomen demonstrates hypoattenuating  lesion in the right hepatic lobe with peripheral lipiodol staining  from prior TACE. Heterogeneous hepatic parenchyma with nodular contour  compatible with history of cirrhosis. Gallbladder is distended with  mild wall thickening likely related to adjacent hepatic parenchymal  disease, and unchanged compared to the prior examinations. Density  within the upper pole renal calyces, greatest on the right is likely  related to intravenous contrast administration from same day abdominal  MRI. No suspicious osseous lesions. Moderate degenerative change in  the thoracic spine.         IMPRESSION:   1. No evidence of metastatic disease in the chest.  2. Resolution of previous, likely infectious groundglass opacities and  subsolid pulmonary nodules compared to 5/24/2018.  3. Partially visualized hepatic cirrhosis and changes from prior  transarterial chemoembolization for known hepatocellular carcinoma.  Findings are  better evaluated on same day MRI.      ASSESSMENT/PLAN:    HCC  S8 3.7 cm OPTN 5B lesion  S7 LIRADS 4 lesions x 2    No evidence of metastatic disease    He had chemoembolization of the S8 lesion on 6/13/18.    As there was residual tumor left, he had microwave ablation of residual mass in hepatic segment 8 on 7/2/2018.     S7 IRADS 4 lesions were not treated    Repeat MRI on 10/3/18 shows no residual viable tumor in S8 lesion  S7 LIRADS 4 lesion was less well defined.  He has some scattered LIRADS 3 lesions    He is undergoing liver transplant evaluation    I would recommend repeat imaging and labs in 3 months      Pulmonary nodule-indeterminate 4 mm nodule in  right lower lobe has now resolved. Will not repeat routine imaging for chest for now       Etoh related Cirrhosis.  Continue to follow with Dr. Carballo   I congratulated him on not drinking alcohol for last several months and strongly encouraged him not to drink it again.    Choking.  Off-and-on he has noticed that he chokes when he swallows.  I will give him a referral to have a swallow evaluation.    Pancytopenia, stable. mainly thrombocytopenia- in the setting of cirrhosis and portal HTN and splenomegaly. Will repeat in 3 months    I encouraged him to follow with his primary care physician.  Previously he was following at [facility] but he wants to find another primary care physician in our system.    I will see him back in 3 months with repeat labs and MRI prior to that    All questions answered and he agrees with the plan    Hi Melgar

## 2018-10-04 NOTE — PATIENT INSTRUCTIONS
Refer to swallow evaluation    Follow with PCP and Dr Carballo    In 3 months, repeat labs and MRI and see me a few days after that

## 2018-10-04 NOTE — LETTER
10/4/2018       RE: Loy Limon  [address]  [facility] [address]     Dear Colleague,    Thank you for referring your patient, Loy Limon, to the [facility]. Please see a copy of my visit note below.    Oncology follow up visit:  Date on this visit: 10/4/2018     HCC    Primary Physician: [facility], Oncology     History Of Present Illness:      Please see previous note for details. I have copied and updated from prior note      Mr. Limon is a 63 year old male who has been a chronic heavy alcohol drinker presented with right abdominal pain in March 2018 and workup including a CT scan showed cirrhosis, portal hypertension and 2 hepatic lesions in the right hepatic lobe which were concerning for hepatocellular carcinoma.  He had MRI on 3/16/2018 which confirmed cirrhosis and portal hypertension and splenomegaly. 3.7 cm segment 8 lesion was OPTN 5B.  There was an antecedent 0.9 cm satellite nodule in hepatic segment 8 consistent with LI-RADS 4.  In segment 7, 1.5 cm LIRADS 4 lesion was seen  Another 1.9 cm segment 7 lesion was seen which was indeterminate.  Several other LIRADS 3 lesions were scattered.  Alpha-fetoprotein was not elevated.  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 Hepatitis B surface antibody was reactive consistent with effective vaccination. Hepatitis C antibody was nonreactive.  He has had no ascites. He has had mild hepatic and Neuropathy but was unable to tolerate lactulose because of abdominal discomfort. He has no history of variceal bleeding although he has grade 2 esophageal varices which have not been banded and he continues to be on propranolol.    He had repeat MRI done on 5/24/2018 which showed stable to slight increase in size of the segment 8 lesion.  Segment 7 lesion was consistent with LIRADS 4 lesion  Another segment 7 lesion is also consistent with LIRADS 4 lesion  CT of  the chest showed a 4 mm solitary pulmonary nodule in the right lower lobe which remains indeterminate. There was no other evidence of metastasis.  Bone scan was negative for any evidence of metastatic disease.    He had chemoembolization of the S8 lesion on 6/13/18.    As there was residual tumor left, he had microwave ablation of residual mass in hepatic segment 8 on 7/2/2018.     S7 IRADS 4 lesions were not treated    Repeat MRI on 10/3/18 shows no residual viable tumor in S8 lesion  S7 LIRADS 4 lesion was less well defined.  He has some scattered LIRADS 3 lesions    Overall he is feeling about the same as before.  Denies any significant abdominal pain.  No nausea or vomiting.  No diarrhea or constipation.  No infections.  He tells me that he has noticed that sometimes when he swallows he chokes and has a coughing episode.  Denies any difficulty breathing per se.  No bleeding.  No new swellings but continues to have lower extremity edema.  Continues to have chronic low back pain.  His last alcohol drink was in February 2018    ECOG 1    ROS:  Rest of the comprehensive review of the system was essentially unremarkable.    I reviewed other history in epic as below.    Past Medical/Surgical History:  Past Medical History:   Diagnosis Date     Cancer (H)     hepatocellualr carcinoma     Cirrhosis of liver (H) 5/8/2018     Enlarged prostate      Inguinal hernia      Liver lesion 5/8/2018     Past Surgical History:   Procedure Laterality Date     APPENDECTOMY       COLONOSCOPY      2018     Cancer History:   As above    Allergies:  Allergies as of 10/04/2018     (No Known Allergies)     Current Medications:  Current Outpatient Prescriptions   Medication Sig Dispense Refill     alfuzosin (UROXATRAL) 10 MG 24 hr tablet Take 10 mg by mouth daily       NADOLOL PO Take 20 mg by mouth daily       omeprazole (PRILOSEC) 20 MG CR capsule Take 20 mg by mouth       rifampin (RIFADIN) 300 MG capsule Take 2 capsules (600 mg) by  "mouth daily 60 capsule 3     spironolactone (ALDACTONE) 25 MG tablet Take 2 tablets (50 mg) by mouth daily 60 tablet 3     TRAMADOL HCL PO Take 50 mg by mouth as needed for moderate to severe pain       XIFAXAN 550 MG TABS tablet        furosemide (LASIX) 20 MG tablet Take 1 tablet (20 mg) by mouth daily (Patient not taking: Reported on 2018) 60 tablet 3      Family History:  Family History   Problem Relation Age of Onset     Liver Disease Brother       Maternal grand mother had Uterus cancer at 63  He has 3 living children. One son with schizophrenia  One daughter dies of leukemia at 8 years of age  Son  shortly after birth. He had some brain congenital anomaly    Social History:  Social History     Social History     Marital status:      Spouse name: N/A     Number of children: N/A     Years of education: N/A     Occupational History     Not on file.     Social History Main Topics     Smoking status: Former Smoker     Packs/day: 0.03     Years: 20.00     Types: Cigarettes, Cigars     Start date: 1970     Quit date: 3/14/2018     Smokeless tobacco: Never Used      Comment: Quit smoking 2018, one cigarette after dinner     Alcohol use No      Comment: Quit drinking 2018     Drug use: No     Sexual activity: Not on file     Other Topics Concern     Not on file     Social History Narrative    Born in [place], came to [place] years ago.     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cardboard, trimming meats   smoker for 25 years. One pack for 1 week. Quit in 2018.  Has been a heavy drinker for 30 years, beer about 36 every week. Last drink was around 2018.  No drug use.  Lives with wife. 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  Physical Exam:  /75 (BP Location: Left arm, Patient Position: Sitting, Cuff Size: Adult Regular)  Pulse 59  Temp 98.2  F (36.8  C) (Tympanic)  Resp 18  Ht 1.702 m (5' 7.01\")  Wt 83.9 kg (185 lb)  SpO2 99%  BMI 28.97 kg/m2  CONSTITUTIONAL: " No apparent distress  EYES: PERRLA, without pallor or jaundice  ENT/MOUTH: Ears unremarkable. No oral lesions  CVS: s1s2 normal  RESPIRATORY: Chest is clear  GI: Abdomen is benign with liver edge palpable below costal margin.  NEURO: Alert and oriented ×3  INTEGUMENT: no concerning skin rashes   LYMPHATIC: no palpable lymphadenopathy  MUSCULOSKELETAL: Unremarkable. No bony tenderness.   EXTREMITIES: Trace pedal edema  PSYCH: Mentation, mood and affect are appropriate      Laboratory/Imaging Studies      Results for orders placed or performed in visit on 10/03/18   *CBC with platelets differential   Result Value Ref Range    WBC 2.6 (L) 4.0 - 11.0 10e9/L    RBC Count 3.36 (L) 4.4 - 5.9 10e12/L    Hemoglobin 11.1 (L) 13.3 - 17.7 g/dL    Hematocrit 32.9 (L) 40.0 - 53.0 %    MCV 98 78 - 100 fl    MCH 33.0 26.5 - 33.0 pg    MCHC 33.7 31.5 - 36.5 g/dL    RDW 13.3 10.0 - 15.0 %    Platelet Count 95 (L) 150 - 450 10e9/L    Diff Method Automated Method     % Neutrophils 51.3 %    % Lymphocytes 32.3 %    % Monocytes 11.0 %    % Eosinophils 4.6 %    % Basophils 0.4 %    % Immature Granulocytes 0.4 %    Nucleated RBCs 0 0 /100    Absolute Neutrophil 1.4 (L) 1.6 - 8.3 10e9/L    Absolute Lymphocytes 0.9 0.8 - 5.3 10e9/L    Absolute Monocytes 0.3 0.0 - 1.3 10e9/L    Absolute Eosinophils 0.1 0.0 - 0.7 10e9/L    Absolute Basophils 0.0 0.0 - 0.2 10e9/L    Abs Immature Granulocytes 0.0 0 - 0.4 10e9/L    Absolute Nucleated RBC 0.0    AFP tumor marker   Result Value Ref Range    Alpha Fetoprotein 5.9 0 - 8 ug/L   Results for DONNA BIGGS (MRN [id number]) as of 10/4/2018 12:38   Ref. Range 9/18/2018 15:28   Sodium Latest Ref Range: 133 - 144 mmol/L 139   Potassium Latest Ref Range: 3.4 - 5.3 mmol/L 4.1   Chloride Latest Ref Range: 94 - 109 mmol/L 108   Carbon Dioxide Latest Ref Range: 20 - 32 mmol/L 26   Urea Nitrogen Latest Ref Range: 7 - 30 mg/dL 7   Creatinine Latest Ref Range: 0.66 - 1.25 mg/dL 0.51 (L)   GFR Estimate Latest Ref  Range: >60 mL/min/1.7m2 >90   GFR Estimate If Black Latest Ref Range: >60 mL/min/1.7m2 >90   Calcium Latest Ref Range: 8.5 - 10.1 mg/dL 8.0 (L)   Anion Gap Latest Ref Range: 3 - 14 mmol/L 5   Albumin Latest Ref Range: 3.4 - 5.0 g/dL 2.4 (L)   Protein Total Latest Ref Range: 6.8 - 8.8 g/dL 6.8   Bilirubin Total Latest Ref Range: 0.2 - 1.3 mg/dL 1.5 (H)   Alkaline Phosphatase Latest Ref Range: 40 - 150 U/L 253 (H)   ALT Latest Ref Range: 0 - 70 U/L 38   AST Latest Ref Range: 0 - 45 U/L 45   Glucose Latest Ref Range: 70 - 99 mg/dL 178 (H)   Results for DONNA BIGGS (MRN [id number]) as of 10/4/2018 12:38   Ref. Range 6/18/2018 07:47 7/19/2018 11:32 8/21/2018 10:50 9/18/2018 15:24 10/3/2018 09:23   Alpha Fetoprotein Latest Ref Range: 0 - 8 ug/L 3.8 4.9 4.5 4.4 5.9     MRI ABDOMEN 10/30/18     CLINICAL HISTORY: follow up HCC; Cirrhosis (H); Pulmonary nodules     Images were acquired with and without intravenous gadolinium contrast  through the upper abdomen. The following MR images were acquired  without intravenous contrast: TrueFISP, multiplanar T2-weighted, axial  T1 in/out of phase, T2-weighted MRCP images, axial diffusion-weighted  and axial apparent diffusion coefficient. T1-weighted images were  obtained before contrast at the multiple time points following  contrast injection. 3-D reformatted images were generated by the  technologist.     Contrast dose: 9.0mL Gadavist     FINDINGS:     Comparison study: MRI on 7/11/2018, CT on 7/2/2018 microwave ablation     Liver: Redemonstration of cirrhotic with lacelike T2 hyperintensity  throughout the hepatic parenchyma, and diffuse siderotic nodules  throughout the liver and contour nodularity.     Post chemoembolization and ablation changes in hepatic segment 8.  Intrinsic T1 hyperintensity compatible treatment related change and  involving blood products. Expected thin rim of enhancement without  suspicious enhancing nodularity or washout, similar to prior.  LI-RADS  TR nonviable.     Lesion 1: There is a 13 mm arterially enhancing lesion in hepatic  segment  8, adjacent to the gallbladder fossa (series 8, image 24). It  does not washout on portal venous or delayed phase imaging.  Pseudocapsule is not identified. There is no associated increased T2  signal.  There is no increased signal on diffusion weighted images.  Previously this measured 12 mm on MRI dated 7/11/2018. It does not  meet the criteria for threshold growth.  There there is not evidence  of venous invasion.  OPTN/LIRADS class 3.     Lesion 2: There is a 17 x 24 mm subcapsular region which is isointense  during the arterial phase and shows persistent enhancement during the  more delayed phases in hepatic segment  7 (series 11, image 19). It  does not washout on portal venous or delayed phase imaging.  Pseudocapsule is not identified. There is intermediate increased T2  signal. There is no increased signal on diffusion weighted images.  Previously this measured 17 x 25 cm. This was better defined on prior  imaging. It does not meet criteria for threshold growth There is not  evidence of venous invasion.  OPTN/LIRADS class 4     Lesion 3: There is a 13 x 21 mm with T1 hyperintense lesion without  convincing arterial enhancement lesion in hepatic segment  7 located  immediately adjacent to above lesion #2 (series 8, image 22). It does  not definitively washout on portal venous or delayed phase imaging. No  pseudocapsule formation.There is no associated increased T2 signal.   There is no increased signal on diffusion weighted images. There is  not evidence of venous invasion.  OPTN/LIRADS class 3     Lesion 4: There is a 11 mm arterially enhancing lesion in hepatic  segment  6 (series 8, image 42) It was not washout on portal venous or  delayed phase imaging. Pseudocapsule is not identified. There is no  associated increased T2 signal.  There is no increased signal on  diffusion weighted images. Previously  this measured 10 mm. It does not  meet criteria for threshold growth There is not evidence of venous  invasion.  OPTN/LIRADS class 3     Lesion 5: There is a 7 mm arterially enhancing subcapsular lesion in  hepatic segment 5/6 (series 8 and image 46). It does not washout on  portal venous or delayed phase imaging, demonstrates progressive  enhancement on the delayed images. There is associated increased T2  signal (series 19, image 27).  There is no increased signal on  diffusion weighted images. Previously this measured 7 mm.There is not  evidence of venous invasion.  OPTN/LIRADS class 3     Gallbladder: Distended gallbladder with no appreciable gallstones.     Spleen: Splenomegaly measuring up to 15.8 cm in craniocaudal  dimension.     Kidneys: Within normal limits.     Adrenal glands: Within normal limits.     Pancreas: Normal T1 signal of the pancreas. No pancreatic ductal  dilatation.     Bowel: No dilated loops of small bowel or colon. Scattered colonic  diverticulosis with evidence of diverticulitis.     Lymph nodes: Stable prominent gabrielle hepatis lymph nodes.     Blood vessels: Major intra-abdominal vessels are patent. Splenorenal  collateral vessel formation.     Lung bases: Relatively clear.     Bones and soft tissues: Unremarkable.     Mesentery and abdominal wall: Within normal limits.     Ascites: Trace amount of free fluid in the abdomen.         IMPRESSION:    1. Hepatic cirrhosis and evidence of portal hypertension including  splenomegaly, small volume ascites, and portosystemic collateral  vessel formation.  2. Chemoembolization and microwave ablation changes in hepatic segment  8 without evidence of residual viable tumor. LR TR nonviable.  3. Subtle LIRADS 4 lesion in segment 7, less well-defined than on the  prior exams.  As before, this is located immediately adjacent to  appears to be a prominent dysplastic nodule.  4. Additional indeterminate LIRADS 3 lesions as above  5. Based on this exam  only, the patient is within  [place] criteria.       Exam: CT Chest without contrast 10/3/2018 10:05 AM      History: follow up for lung nodule  hx of HCC; Pulmonary nodules;  Cirrhosis (H)     Comparison: Cardiac CT 7/25/2018, chest CT 5/24/2018.     TECHNIQUE: Helical acquisition of CT images from the lung apices to  the kidneys without IV contrast. Multiplanar images and axial MIP  images were reconstructed from the source data.     FINDINGS:      Visualized thyroid is unremarkable. Heart size is normal, without  pericardial effusion. Moderate coronary artery and mitral annular  calcifications. Aorta and main pulmonary artery are normal in caliber.  Normal aortic branching pattern. No mediastinal, hilar, or axillary  lymphadenopathy.     The central tracheobronchial tree is patent. No pleural effusion or  pneumothorax. No pulmonary consolidation. Subpleural reticulation in  the periphery of the lung bases is unchanged compared to 5/24/2018.  Left lower lobe calcified granuloma. Resolution of previous scattered  groundglass opacities primarily involving the right lower lobe  compared to 7/25/2018. No suspicious, new, or enlarging noncalcified  solid pulmonary nodules.     Limited evaluation of the upper abdomen demonstrates hypoattenuating  lesion in the right hepatic lobe with peripheral lipiodol staining  from prior TACE. Heterogeneous hepatic parenchyma with nodular contour  compatible with history of cirrhosis. Gallbladder is distended with  mild wall thickening likely related to adjacent hepatic parenchymal  disease, and unchanged compared to the prior examinations. Density  within the upper pole renal calyces, greatest on the right is likely  related to intravenous contrast administration from same day abdominal  MRI. No suspicious osseous lesions. Moderate degenerative change in  the thoracic spine.         IMPRESSION:   1. No evidence of metastatic disease in the chest.  2. Resolution of previous, likely  infectious groundglass opacities and  subsolid pulmonary nodules compared to 5/24/2018.  3. Partially visualized hepatic cirrhosis and changes from prior  transarterial chemoembolization for known hepatocellular carcinoma.  Findings are better evaluated on same day MRI.      ASSESSMENT/PLAN:    HCC  S8 3.7 cm OPTN 5B lesion  S7 LIRADS 4 lesions x 2    No evidence of metastatic disease    He had chemoembolization of the S8 lesion on 6/13/18.    As there was residual tumor left, he had microwave ablation of residual mass in hepatic segment 8 on 7/2/2018.     S7 IRADS 4 lesions were not treated    Repeat MRI on 10/3/18 shows no residual viable tumor in S8 lesion  S7 LIRADS 4 lesion was less well defined.  He has some scattered LIRADS 3 lesions    He is undergoing liver transplant evaluation    I would recommend repeat imaging and labs in 3 months      Pulmonary nodule-indeterminate 4 mm nodule in  right lower lobe has now resolved. Will not repeat routine imaging for chest for now       Etoh related Cirrhosis.  Continue to follow with Dr. Carballo   I congratulated him on not drinking alcohol for last several months and strongly encouraged him not to drink it again.    Choking.  Off-and-on he has noticed that he chokes when he swallows.  I will give him a referral to have a swallow evaluation.    Pancytopenia, stable. mainly thrombocytopenia- in the setting of cirrhosis and portal HTN and splenomegaly. Will repeat in 3 months    I encouraged him to follow with his primary care physician.  Previously he was following at [facility] but he wants to find another primary care physician in our system.    I will see him back in 3 months with repeat labs and MRI prior to that    All questions answered and he agrees with the plan          Again, thank you for allowing me to participate in the care of your patient.      Sincerely,    Hi Melgar MD

## 2018-10-04 NOTE — NURSING NOTE
"Oncology Rooming Note    October 4, 2018 3:57 PM   Loy Limon is a 63 year old male who presents for:    Chief Complaint   Patient presents with     Oncology Clinic Visit     Return visit related to HCC     Initial Vitals: /75 (BP Location: Left arm, Patient Position: Sitting, Cuff Size: Adult Regular)  Pulse 59  Temp 98.2  F (36.8  C) (Tympanic)  Resp 18  Ht 1.702 m (5' 7.01\")  Wt 83.9 kg (185 lb)  SpO2 99%  BMI 28.97 kg/m2 Estimated body mass index is 28.97 kg/(m^2) as calculated from the following:    Height as of this encounter: 1.702 m (5' 7.01\").    Weight as of this encounter: 83.9 kg (185 lb). Body surface area is 1.99 meters squared.  Worst Pain (10) Comment: Data Unavailable   No LMP for male patient.  Allergies reviewed: Yes  Medications reviewed: Yes    Medications: Medication refills not needed today.  Pharmacy name entered into EPIC: [facility] - [place], [place] - [address]    Clinical concerns: Patient complains of severe pain in low back region. Provider was notified.    10 minutes for nursing intake (face to face time)     Sue Jorgensen LPN            "

## 2018-10-05 ENCOUNTER — DOCUMENTATION ONLY (OUTPATIENT)
Dept: TRANSPLANT | Facility: CLINIC | Age: 63
End: 2018-10-05

## 2018-10-10 ENCOUNTER — DOCUMENTATION ONLY (OUTPATIENT)
Dept: TRANSPLANT | Facility: CLINIC | Age: 63
End: 2018-10-10

## 2018-10-10 DIAGNOSIS — K74.60 CIRRHOSIS OF LIVER (H): Primary | ICD-10-CM

## 2018-10-10 DIAGNOSIS — Z79.899 OTHER LONG TERM (CURRENT) DRUG THERAPY: ICD-10-CM

## 2018-10-10 NOTE — PROGRESS NOTES
Pt approved for listing pending SW. Per SW pt appropriate for listing.     Call made to pt to discuss getting listing MELD labs. Importance of completing CD treatment also reviewed. Pt verbalized understanding and denied questions or concerns. Pt confirmed he will have labs drawn Friday. MELD orders placed.     Communication completed via [facility] .

## 2018-10-12 ENCOUNTER — DOCUMENTATION ONLY (OUTPATIENT)
Dept: TRANSPLANT | Facility: CLINIC | Age: 63
End: 2018-10-12

## 2018-10-12 ENCOUNTER — OFFICE VISIT (OUTPATIENT)
Dept: RADIOLOGY | Facility: CLINIC | Age: 63
End: 2018-10-12
Attending: RADIOLOGY
Payer: MEDICARE

## 2018-10-12 VITALS
HEART RATE: 51 BPM | OXYGEN SATURATION: 99 % | WEIGHT: 184 LBS | HEIGHT: 67 IN | DIASTOLIC BLOOD PRESSURE: 66 MMHG | SYSTOLIC BLOOD PRESSURE: 135 MMHG | TEMPERATURE: 97.4 F | RESPIRATION RATE: 16 BRPM | BODY MASS INDEX: 28.88 KG/M2

## 2018-10-12 DIAGNOSIS — C22.0 HCC (HEPATOCELLULAR CARCINOMA) (H): Primary | ICD-10-CM

## 2018-10-12 DIAGNOSIS — Z79.899 OTHER LONG TERM (CURRENT) DRUG THERAPY: ICD-10-CM

## 2018-10-12 DIAGNOSIS — K74.60 CIRRHOSIS OF LIVER (H): ICD-10-CM

## 2018-10-12 LAB
ALBUMIN SERPL-MCNC: 2.6 G/DL (ref 3.4–5)
BILIRUB SERPL-MCNC: 1.9 MG/DL (ref 0.2–1.3)
CREAT SERPL-MCNC: 0.64 MG/DL (ref 0.66–1.25)
GFR SERPL CREATININE-BSD FRML MDRD: >90 ML/MIN/1.7M2
INR PPP: 2.1 (ref 0.86–1.14)
SODIUM SERPL-SCNC: 138 MMOL/L (ref 133–144)

## 2018-10-12 PROCEDURE — 84295 ASSAY OF SERUM SODIUM: CPT | Performed by: INTERNAL MEDICINE

## 2018-10-12 PROCEDURE — 82247 BILIRUBIN TOTAL: CPT | Performed by: INTERNAL MEDICINE

## 2018-10-12 PROCEDURE — 82040 ASSAY OF SERUM ALBUMIN: CPT | Performed by: INTERNAL MEDICINE

## 2018-10-12 PROCEDURE — G0463 HOSPITAL OUTPT CLINIC VISIT: HCPCS | Mod: ZF

## 2018-10-12 PROCEDURE — 85610 PROTHROMBIN TIME: CPT | Performed by: INTERNAL MEDICINE

## 2018-10-12 PROCEDURE — 82565 ASSAY OF CREATININE: CPT | Performed by: INTERNAL MEDICINE

## 2018-10-12 PROCEDURE — 80321 ALCOHOLS BIOMARKERS 1OR 2: CPT | Performed by: INTERNAL MEDICINE

## 2018-10-12 PROCEDURE — 36415 COLL VENOUS BLD VENIPUNCTURE: CPT | Performed by: INTERNAL MEDICINE

## 2018-10-12 ASSESSMENT — PAIN SCALES - GENERAL: PAINLEVEL: NO PAIN (0)

## 2018-10-12 NOTE — PROGRESS NOTES
Listed for liver transplant.   Dx: ETOH cirrhosis and HCC  ABO: B  MELD: 17    The following submitted for HCC exception:     This is the initial request for HCC exception that does not meet criteria for automatic approval. The patient is a 64yo with ETOH cirrhosis who had an MRI on 3/16/18 that showed a 3.7cm HCC. AFP was 4 on 5/29/18. Pt underwent chemoembolization on 6/13/18 and microwave ablation on 7/2/18. MRI on 10/3/18 showed no HCC. AFP was  6 on 10/3/18. This patient has had great response to treatment and is within [place] criteria. We are requesting the patient be granted initial HCC exception.

## 2018-10-12 NOTE — MR AVS SNAPSHOT
After Visit Summary   10/12/2018    Loy Limon    MRN: [id number]           Patient Information     Date Of Birth          1955        Visit Information        Provider Department      10/12/2018 8:45 AM Debora Ac MD; [facility]        Care Instructions    It was a pleasure to see you today.  We no longer need to see you and will have Hepatology follow you from now on.     Please feel free to call with any other questions.     Izabela Sherman RN, BSN  Interventional Radiology Nurse Coordinator   Phone: [phone]            Follow-ups after your visit        Your next 10 appointments already scheduled     Oct 24, 2018  9:20 AM CDT   (Arrive by 9:05 AM)   New Patient Visit with Jaswinder Anne MD   [facility] ([facility])    [address] [place] [address]  4th Floor  [facility] [address] [phone]            Nov 14, 2018  8:00 AM CST   Lab with  LAB   [facility] Lab ([facility])    [address] [place] [address]  1st Floor  [facility] [address] [phone]            Nov 14, 2018  9:00 AM CST   (Arrive by 8:45 AM)   Return General Liver with Soledad Skelton PA-C   [facility] Hepatology ([facility])    [address] [place] [address]  [address]  [facility] [address] [phone]            Dec 04, 2018  2:00 PM CST   (Arrive by 1:45 PM)   Return Visit with Pauline Clancy MD   [facility] and Infectious Diseases ([facility])    [address] [place] [address]  [address]  [facility] [address] [phone]            Jan 02, 2019  6:45 AM CST   Lab with UC LAB   [facility] Lab ([facility])    [address] [place] [address]  1st Floor  [facility] [address] [phone]            Jan 02, 2019  7:00 AM CST   MR ABDOMEN W/O & W CONTRAST with [address] [facility] ([facility]  [place])    [address] [address] [phone]           How do I prepare for my exam? (Food and drink instructions) Do not eat or drink for 6 hours prior to exam. **If you will be receiving sedation or general anesthesia, please see special notes below.**  How do I prepare for my exam? (Other instructions) Take your medicines as usual, unless your doctor tells you not to. You may or may not receive IV contrast for this exam pending the discretion of the Radiologist.  **If you will be receiving sedation or general anesthesia, please see special notes below.**  What should I wear: The MRI machine uses a strong magnet. Please wear clothes without metal (snaps, zippers). A sweatsuit works well, or we may give you a hospital gown. Please remove any body piercings and hair extensions before you arrive. You will also remove watches, jewelry, hairpins, wallets, dentures, partial dental plates and hearing aids. You may wear contact lenses, and you may be able to wear your rings. We have a safe place to keep your personal items, but it is safer to leave them at home.  How long does the exam take: Most tests take 30 to 60 minutes.  HOWEVER, IF YOUR DOCTOR PRESCRIBES ANESTHESIA please plan on spending four to five hours in the recovery room.  What should I bring: Bring a list of your current medicines to your exam (including vitamins, minerals and over-the-counter drugs). Also bring the results of similar scans you may have had.  Do I need a : **If you will be receiving sedation or general anesthesia, please see special notes below.**  What should I do after the exam: No Restrictions, You may resume normal activities.  What is this test: MRI (magnetic resonance imaging) uses a strong magnet and radio waves to look inside the body. An MRA (magnetic resonance angiogram) does the same thing, but it lets us look at your blood vessels. A computer turns the radio waves into pictures  showing cross sections of the body, much like slices of bread. This helps us see any problems more clearly. You may receive fluid (called  contrast ) before or during your scan. The fluid helps us see the pictures better. We give the fluid through an IV (small needle in your arm).  Who should I call with questions: If you have any questions, please contact your Imaging Department exam site. Directions, parking instructions, and other information is available on our website, [email or web address].            Ziyad 10, 2019  3:30 PM CST   (Arrive by 3:15 PM)   Return Visit with Hi Melgar MD   [facility] ([facility])    [address]  [address]  [facility] [address] [phone]            Mar 01, 2019 10:30 AM CST   (Arrive by 10:15 AM)   Return Visit with Khloe Torres MD   [facility] ([facility])    [address]  4th Floor  [facility] [address] [phone]              Who to contact     If you have questions or need follow up information about today's clinic visit or your schedule please contact [facility] directly at [phone].  Normal or non-critical lab and imaging results will be communicated to you by MyChart, letter or phone within 4 business days after the clinic has received the results. If you do not hear from us within 7 days, please contact the clinic through MyChart or phone. If you have a critical or abnormal lab result, we will notify you by phone as soon as possible.  Submit refill requests through [employer] or call your pharmacy and they will forward the refill request to us. Please allow 3 business days for your refill to be completed.          Additional Information About Your Visit        Care EveryWhere ID     This is your Care EveryWhere ID. This could be used by other organizations to access your [facility]  "records  [id number]        Your Vitals Were     Pulse Temperature Respirations Height Pulse Oximetry BMI (Body Mass Index)    51 97.4  F (36.3  C) (Oral) 16 5' 7\" 99% 28.82 kg/m2       Blood Pressure from Last 3 Encounters:   10/12/18 135/66   10/04/18 144/75   09/18/18 129/63    Weight from Last 3 Encounters:   10/12/18 184 lb   10/04/18 185 lb   09/18/18 188 lb 11.2 oz              Today, you had the following     No orders found for display       Primary Care Provider    None Specified       No primary provider on file.        Equal Access to Services     [facility]: Hadii kostas Pittman, karin cheema, ciara kaalmada sergio, javier rico . So [facility] [phone].    ATENCIÓN: Si habla español, tiene a samaniego disposición servicios gratuitos de asistencia lingüística. Llame al [phone].    We comply with applicable federal civil rights laws and [place] laws. We do not discriminate on the basis of race, color, national origin, age, disability, sex, sexual orientation, or gender identity.            Thank you!     Thank you for choosing [facility]  for your care. Our goal is always to provide you with excellent care. Hearing back from our patients is one way we can continue to improve our services. Please take a few minutes to complete the written survey that you may receive in the mail after your visit with us. Thank you!             Your Updated Medication List - Protect others around you: Learn how to safely use, store and throw away your medicines at www.disposemymeds.org.          This list is accurate as of 10/12/18 10:08 AM.  Always use your most recent med list.                   Brand Name Dispense Instructions for use Diagnosis    alfuzosin 10 MG 24 hr tablet    UROXATRAL     Take 10 mg by mouth daily    Alcoholic cirrhosis of liver without ascites (H), HCC (hepatocellular carcinoma) (H)       furosemide 20 MG tablet    LASIX    60 tablet "    Take 1 tablet (20 mg) by mouth daily    Alcoholic cirrhosis of liver without ascites (H), HCC (hepatocellular carcinoma) (H), Edema, unspecified type       NADOLOL PO      Take 20 mg by mouth daily        omeprazole 20 MG CR capsule    priLOSEC     Take 20 mg by mouth        rifampin 300 MG capsule    RIFADIN    60 capsule    Take 2 capsules (600 mg) by mouth daily    Latent tuberculosis infection       spironolactone 25 MG tablet    ALDACTONE    60 tablet    Take 2 tablets (50 mg) by mouth daily    Alcoholic cirrhosis of liver without ascites (H), HCC (hepatocellular carcinoma) (H), Edema, unspecified type       TRAMADOL HCL PO      Take 50 mg by mouth as needed for moderate to severe pain        XIFAXAN 550 MG Tabs tablet   Generic drug:  rifaximin

## 2018-10-12 NOTE — PATIENT INSTRUCTIONS
It was a pleasure to see you today.  We no longer need to see you and will have Hepatology follow you from now on.     Please feel free to call with any other questions.     Izabela Sherman RN, BSN  Interventional Radiology Nurse Coordinator   Phone: [phone]

## 2018-10-12 NOTE — NURSING NOTE
"Oncology Rooming Note    October 12, 2018 9:06 AM   Loy Limon is a 63 year old male who presents for:    Chief Complaint   Patient presents with     Oncology Clinic Visit     Return Hepatocellular Carcinoma     Initial Vitals: /66  Pulse 51  Temp 97.4  F (36.3  C) (Oral)  Resp 16  Ht 1.702 m (5' 7\")  Wt 83.5 kg (184 lb)  SpO2 99%  BMI 28.82 kg/m2 Estimated body mass index is 28.82 kg/(m^2) as calculated from the following:    Height as of this encounter: 1.702 m (5' 7\").    Weight as of this encounter: 83.5 kg (184 lb). Body surface area is 1.99 meters squared.  No Pain (0) Comment: Data Unavailable   No LMP for male patient.  Allergies reviewed: Yes  Medications reviewed: Yes    Medications: MEDICATION REFILLS NEEDED TODAY. Provider was notified.  Pharmacy name entered into EPIC: [facility] - [place], [place] - [address]    Clinical concerns: Refill on Tramadol      6 minutes for nursing intake (face to face time)     Gilma De Guzman, THERESE              "

## 2018-10-12 NOTE — LETTER
10/12/2018       RE: Loy Limon  [address]  [facility] [place] [address]     Dear Colleague,    Thank you for referring your patient, Loy Limon, to the [facility]. Please see a copy of my visit note below.        Interventional Radiology Clinic Visit    Date of this visit: 10/12/2018    Loy Limon returns for follow up post transarterial chemoembolization and microwave ablation of a segment 7 HCC on 6/13/18 and 7/2/18 respectively.     Primary Physician: No primary care provider on file.        History Of Present Illness:    Loy Limon is a 63 year old male with a diagnosis of HCC on a background of Etoh cirrhosis. He presents after TACE then ablation of segment 7 lesion.  He denies abdominal pain, N/V/D, CP or SOB.  He is requesting tramadol for chronic pain issues.      Review of Systems:    General: Negative for recent fever.  Skin: Negative for jaundice.  Eyes: Negative for jaundice.  Respiratory: Negative for shortness of breath.  Cardiovascular: Negative for chest pain.  Gastrointestinal: Negative for abdominal pain or swelling, nausea, vomiting, or diarrhea.  Musculoskeletal: Negative for ankle swelling.   Vascular: Negative for groin pain or hematoma at femoral artery puncture site.    Past Medical/Surgical History:    Past Medical History:   Diagnosis Date     Cancer (H)     hepatocellualr carcinoma     Cirrhosis of liver (H) 5/8/2018     Enlarged prostate      Inguinal hernia      Liver lesion 5/8/2018     Past Surgical History:   Procedure Laterality Date     APPENDECTOMY       COLONOSCOPY      2018       Current Medications:    Current Outpatient Prescriptions   Medication Sig Dispense Refill     alfuzosin (UROXATRAL) 10 MG 24 hr tablet Take 10 mg by mouth daily       furosemide (LASIX) 20 MG tablet Take 1 tablet (20 mg) by mouth daily 60 tablet 3     NADOLOL PO Take 20 mg by mouth daily       omeprazole (PRILOSEC) 20 MG CR capsule Take 20 mg by mouth        "rifampin (RIFADIN) 300 MG capsule Take 2 capsules (600 mg) by mouth daily 60 capsule 3     spironolactone (ALDACTONE) 25 MG tablet Take 2 tablets (50 mg) by mouth daily 60 tablet 3     TRAMADOL HCL PO Take 50 mg by mouth as needed for moderate to severe pain       XIFAXAN 550 MG TABS tablet          Allergies:    Review of patient's allergies indicates no known allergies.    Family History:    Family History   Problem Relation Age of Onset     Liver Disease Brother        Social History:      Social History     Social History     Marital status:      Spouse name: N/A     Number of children: N/A     Years of education: N/A     Social History Main Topics     Smoking status: Former Smoker     Packs/day: 0.03     Years: 20.00     Types: Cigarettes, Cigars     Start date: 8/14/1970     Quit date: 3/14/2018     Smokeless tobacco: Never Used      Comment: Quit smoking Feb 2018, one cigarette after dinner     Alcohol use No      Comment: Quit drinking Feb 2018     Drug use: No     Sexual activity: Not Asked     Other Topics Concern     None     Social History Narrative    Born in [place], came to [place] years ago.     Has worked in [employer], trimming meats       Physical Exam:    /66  Pulse 51  Temp 97.4  F (36.3  C) (Oral)  Resp 16  Ht 1.702 m (5' 7\")  Wt 83.5 kg (184 lb)  SpO2 99%  BMI 28.82 kg/m2     GENERAL APPEARANCE: healthy, alert and no distress  PSYCHIATRIC: mentation appears normal and affect normal.  EYES: No jaundice.  SKIN: No jaundice.   ABDOMEN: Soft, non-tender  MUSCULOSKELETAL: No edema in the lower extremities.  VASCULAR: Right groin soft, no hematoma or swelling. Right femoral artery pulse normal.    Laboratory Studies:    Lab Results   Component Value Date    HGB 11.1 10/03/2018     Lab Results   Component Value Date    PLT 95 10/03/2018     Lab Results   Component Value Date    WBC 2.6 10/03/2018       Lab Results   Component Value Date    INR 2.10 10/12/2018 "       Lab Results   Component Value Date    PROTTOTAL 6.8 09/18/2018      Lab Results   Component Value Date    ALBUMIN 2.6 10/12/2018     Lab Results   Component Value Date    BILITOTAL 1.9 10/12/2018     Lab Results   Component Value Date    ALKPHOS 253 09/18/2018     Lab Results   Component Value Date    AST 45 09/18/2018     Lab Results   Component Value Date    ALT 38 09/18/2018       Lab Results   Component Value Date    CR 0.64 10/12/2018     Lab Results   Component Value Date    BUN 7 09/18/2018       Alpha Fetoprotein   Date Value Ref Range Status   10/03/2018 5.9 0 - 8 ug/L Final     Comment:     Assay Method:  Chemiluminescence using Siemens Centaur XP       Imaging:     I reviewed the MRI from 10/3/18. It shows good response to treatment with no residual or recurrent tumor.  There are additional small enhancing lesions but no LIRADS 5 lesions, and these will be under surveillance for now.    ASSESSMENT/PLAN:      Loy Limon is a 63 year old male with HCC in setting of EToh cirrhosis who is status post transarterial chemoembolization and ablation of a solitary HCC and made a satisfactory recovery. Follow up imaging shows full response to treatment, although there are untreated equivocal lesions.We discussed that we are happy with his response.     I explained that I do not need to see him again unless he develops evidence of tumor recurrence or a new lesion that may be amenable to further treatment such as ablation or embolization, at which time he will be referred back to me for further discussion. His ongoing follow up will be with gastroenterolgy and oncology. I encouraged him to speak with his PCP to refill his tramadol for chronic pain. He agreed with this plan.      A total of 20 minutes was spent in care for the patient.    It was a pleasure seeing the patient.     Signed,    Ozzie Mijares MD & Debora Del Rosario M.D.  Department of Interventional Radiology  [facility]  Danilo BELTRE, Dr Debora Del Rosario, was present with the fellow during the history and exam. I discussed the case with the fellow and agree with the findings as documented in the assessment and plan.        CC  Patient Care Team:  Dario Anguiano MD as MD (Vascular and Interventional Radiology)  David Champagne MD (Gastroenterology)  Mathew Self MD as MD (Urology)  Sandrita Cano, RN as Registered Nurse (Urology)  Emil Echevarria MD as MD (Transplant)  Tamela Carballo MD as MD (Gastroenterology)  Araceli Quijano, RN as Registered Nurse (Cardiology)  DEBORA ALBERT    Again, thank you for allowing me to participate in the care of your patient.      Sincerely,    Debora Albert MD

## 2018-10-12 NOTE — LETTER
October 12, 2018    Loy Limon  [address]  [place] [address]      Dear Mr. Limon,    This letter is sent to confirm that you have completed your transplant work-up and you are a candidate in the liver transplant program at the [facility].  You were placed on the liver active waitlist on 10/12/2018.      When you are active on the waitlist and an organ becomes available, a coordinator will need to speak to you immediately.  You could be contacted at any time during the day or night as an organ could become available at any time.  Please make certain our office always has your current telephone numbers and address.      Items we will need from you:      We have received approval from your insurance company for the transplant procedure.  It is critical that you notify us if there is any change in your insurance.  It is also important that you familiarize yourself with the details of your specific insurance policy.  Our patient  is available to assist you if you should have any questions regarding your coverage.      In order to maintain your status on this list please have your MELD labs and liver imaging done as recommended, and continue to follow up with Dr. Carballo as recommended.       During this waiting period, we may request additional periodic laboratory tests with your primary physician.  It will be your responsibility to remind your physician to forward your results to the Transplant Office.      We need to be kept informed of any changes in your medical condition such as:    o changes in your medications,   o significant changes in your health  o significant infections (such as pneumonia or abscesses)  o blood transfusions  o any condition which requires hospitalization  o any surgery      Remember to complete any routine cancer screening tests required before your transplant.  This includes colonoscopy; prostate screening for men, and  mammogram and gynecologic testing for women, as well as dental work.  Your primary care clinic can assist you with arranging for these exams.  Remind your caregivers to forward copies of the records and final reports.    We want you to know that our program has physician and surgeon coverage 24 hours a day, 365 days a year. If this coverage changes or there are substantial program changes, you will be notified in writing by letter.     Attached is a letter from the [employer] ([employer]). It describes the services and information offered to patients by [employer] and the Organ Procurement and Transplantation Network.    We appreciate having had the opportunity to participate in your care.  If you have questions, please feel free to call the Transplant Office at [phone] or [phone].      Sincerely,  Noelle Carlton RN  Liver Transplant Coordinator       Solid Organ Transplant  [facility]    Enclosures: [facility] Letter  cc: Care Team

## 2018-10-12 NOTE — PROGRESS NOTES
Interventional Radiology Clinic Visit    Date of this visit: 10/12/2018    Loy Limon returns for follow up post transarterial chemoembolization and microwave ablation of a segment 7 HCC on 6/13/18 and 7/2/18 respectively.     Primary Physician: No primary care provider on file.        History Of Present Illness:    Loy Limon is a 63 year old male with a diagnosis of HCC on a background of Etoh cirrhosis. He presents after TACE then ablation of segment 7 lesion.  He denies abdominal pain, N/V/D, CP or SOB.  He is requesting tramadol for chronic pain issues.      Review of Systems:    General: Negative for recent fever.  Skin: Negative for jaundice.  Eyes: Negative for jaundice.  Respiratory: Negative for shortness of breath.  Cardiovascular: Negative for chest pain.  Gastrointestinal: Negative for abdominal pain or swelling, nausea, vomiting, or diarrhea.  Musculoskeletal: Negative for ankle swelling.   Vascular: Negative for groin pain or hematoma at femoral artery puncture site.    Past Medical/Surgical History:    Past Medical History:   Diagnosis Date     Cancer (H)     hepatocellualr carcinoma     Cirrhosis of liver (H) 5/8/2018     Enlarged prostate      Inguinal hernia      Liver lesion 5/8/2018     Past Surgical History:   Procedure Laterality Date     APPENDECTOMY       COLONOSCOPY      2018       Current Medications:    Current Outpatient Prescriptions   Medication Sig Dispense Refill     alfuzosin (UROXATRAL) 10 MG 24 hr tablet Take 10 mg by mouth daily       furosemide (LASIX) 20 MG tablet Take 1 tablet (20 mg) by mouth daily 60 tablet 3     NADOLOL PO Take 20 mg by mouth daily       omeprazole (PRILOSEC) 20 MG CR capsule Take 20 mg by mouth       rifampin (RIFADIN) 300 MG capsule Take 2 capsules (600 mg) by mouth daily 60 capsule 3     spironolactone (ALDACTONE) 25 MG tablet Take 2 tablets (50 mg) by mouth daily 60 tablet 3     TRAMADOL HCL PO Take 50 mg by mouth as needed for moderate to  "severe pain       XIFAXAN 550 MG TABS tablet          Allergies:    Review of patient's allergies indicates no known allergies.    Family History:    Family History   Problem Relation Age of Onset     Liver Disease Brother        Social History:      Social History     Social History     Marital status:      Spouse name: N/A     Number of children: N/A     Years of education: N/A     Social History Main Topics     Smoking status: Former Smoker     Packs/day: 0.03     Years: 20.00     Types: Cigarettes, Cigars     Start date: 8/14/1970     Quit date: 3/14/2018     Smokeless tobacco: Never Used      Comment: Quit smoking Feb 2018, one cigarette after dinner     Alcohol use No      Comment: Quit drinking Feb 2018     Drug use: No     Sexual activity: Not Asked     Other Topics Concern     None     Social History Narrative    [place], came [place] years [place].     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cardboard, trimming meats       Physical Exam:    /66  Pulse 51  Temp 97.4  F (36.3  C) (Oral)  Resp 16  Ht 1.702 m (5' 7\")  Wt 83.5 kg (184 lb)  SpO2 99%  BMI 28.82 kg/m2     GENERAL APPEARANCE: healthy, alert and no distress  PSYCHIATRIC: mentation appears normal and affect normal.  EYES: No jaundice.  SKIN: No jaundice.   ABDOMEN: Soft, non-tender  MUSCULOSKELETAL: No edema in the lower extremities.  VASCULAR: Right groin soft, no hematoma or swelling. Right femoral artery pulse normal.    Laboratory Studies:    Lab Results   Component Value Date    HGB 11.1 10/03/2018     Lab Results   Component Value Date    PLT 95 10/03/2018     Lab Results   Component Value Date    WBC 2.6 10/03/2018       Lab Results   Component Value Date    INR 2.10 10/12/2018       Lab Results   Component Value Date    PROTTOTAL 6.8 09/18/2018      Lab Results   Component Value Date    ALBUMIN 2.6 10/12/2018     Lab Results   Component Value Date    BILITOTAL 1.9 10/12/2018     Lab Results   Component Value Date    ALKPHOS 253 " 09/18/2018     Lab Results   Component Value Date    AST 45 09/18/2018     Lab Results   Component Value Date    ALT 38 09/18/2018       Lab Results   Component Value Date    CR 0.64 10/12/2018     Lab Results   Component Value Date    BUN 7 09/18/2018       Alpha Fetoprotein   Date Value Ref Range Status   10/03/2018 5.9 0 - 8 ug/L Final     Comment:     Assay Method:  Chemiluminescence using Siemens Centaur XP       Imaging:     I reviewed the MRI from 10/3/18. It shows good response to treatment with no residual or recurrent tumor.  There are additional small enhancing lesions but no LIRADS 5 lesions, and these will be under surveillance for now.    ASSESSMENT/PLAN:      Loy Limon is a 63 year old male with HCC in setting of EToh cirrhosis who is status post transarterial chemoembolization and ablation of a solitary HCC and made a satisfactory recovery. Follow up imaging shows full response to treatment, although there are untreated equivocal lesions.We discussed that we are happy with his response.     I explained that I do not need to see him again unless he develops evidence of tumor recurrence or a new lesion that may be amenable to further treatment such as ablation or embolization, at which time he will be referred back to me for further discussion. His ongoing follow up will be with gastroenterolgy and oncology. I encouraged him to speak with his PCP to refill his tramadol for chronic pain. He agreed with this plan.      A total of 20 minutes was spent in care for the patient.    It was a pleasure seeing the patient.     Signed,    Ozzie Mijares MD & Debora Del Rosario M.D.  Department of Interventional Radiology  [facility]      I, Dr Debora Del Rosario, was present with the fellow during the history and exam. I discussed the case with the fellow and agree with the findings as documented in the assessment and plan.        CC  Patient Care Team:  Dario Anguiano MD as MD (Vascular and  Interventional Radiology)  David Champagne MD (Gastroenterology)  Mathew Self MD as MD (Urology)  Sandrita Cano, RN as Registered Nurse (Urology)  Emil Echevarria MD as MD (Transplant)  Tamela Carballo MD as MD (Gastroenterology)  Araceli Quijano, RN as Registered Nurse (Cardiology)  MARISSA ALBERT

## 2018-10-13 LAB — ETHYL GLUCURONIDE UR QL: NEGATIVE

## 2018-10-15 ENCOUNTER — DOCUMENTATION ONLY (OUTPATIENT)
Dept: PHARMACY | Facility: CLINIC | Age: 63
End: 2018-10-15

## 2018-10-15 NOTE — PHARMACY-MEDICATION REGIMEN REVIEW
Pharmacy Liver Pre-Transplant Medication Evaluation    This patient is a 63 year old male being considered for liver transplantation.   As part of the liver pre-transplant patient evaluation, pharmacy has screened this patient s electronic medical record for medication related concerns.     Assessment/Plan:  The following medication related issues may be of possible concern for this patient post transplant, based on the medical record medication list review:     Spironolactone: K+ sparing.  Recommend alternative diuretic, if needed,  post transplant in conjunction with tacrolimus immunosuppression. Tacrolimus increases risk of elevated K+ levels.     Pharmacy will continue to participate in this patient's care throughout the transplant course. Please contact pharmacy with any further medication related questions or concerns.    NORMA Starkey.Ph.  [facility]  [phone] pager

## 2018-10-19 NOTE — PROGRESS NOTES
10/3/18  1.) indeterminate lesion(s) - not HCC  2.) treatment changes    Recs:  - 3 mo follow up

## 2018-10-23 NOTE — PROGRESS NOTES
"[facility] [place]   Jaswinder Anne MD  10/24/2018      Chief Complaint:   Establish Care; Chest Pain; and Medication Request    History of Present Illness:   Loy Limon is a 63 year old male with a history of hepatocellular carcinoma, cirrhosis of the liver, and an enlarged prostate who presents for evaluation of chest pain, for a medication request, and to establish care. A  is present throughout the entire encounter.     Chest pain  The patient reports that two weeks ago, he got \"really sharp jabs\" inside under his left chest. The pain stayed after presenting in those \"jabs\" but was no longer \"jabbing.\" The area under his left chest is also swollen. There is no pain radiation into his back. His pain is worse with eating anything, particularly sweet foods; he is trying to not eat chili's in his diet, and has cut his salt intake down as well with hopes that this would help his symptoms. He does have some pain when not eating/in between meals, but always acutely worsens with food and there is nothing he does which alleviates the pain. Drinking water does not aggravate his pain. He is concerned that his could be his pancreas. The patient denies fever, loss of appetite, nausea and vomiting.  He states that his stools are not loose or different because of this pain, but do loosen with his spironolactone --this is baseline for him. Additionally, he denies any urinary symptoms related to his symptoms. Of note, three of his sisters had to have their gallbladders removed.     He is prescribed omeprazole but states that he needs to fill a refill of this. He has not been taking it.     Other concerns discussed:  1) Refills - The patient states that he needs refills on his Tramadol--he takes it for his pain related to his hernia. The patient states that the doctor who was taking care of his liver was also prescribing his Tramadol for his hernia. He does not see that provider anymore--he " "sees someone at the [facility] now.      Review of Systems:   Pertinent items are noted in HPI, remainder of complete ROS is negative.      Active Medications:      alfuzosin (UROXATRAL) 10 MG 24 hr tablet, Take 10 mg by mouth daily, Disp: , Rfl:      furosemide (LASIX) 20 MG tablet, Take 1 tablet (20 mg) by mouth daily, Disp: 60 tablet, Rfl: 3     NADOLOL PO, Take 20 mg by mouth daily, Disp: , Rfl:      omeprazole (PRILOSEC) 20 MG CR capsule, Take 20 mg by mouth, Disp: , Rfl:      rifampin (RIFADIN) 300 MG capsule, Take 2 capsules (600 mg) by mouth daily, Disp: 60 capsule, Rfl: 3     spironolactone (ALDACTONE) 25 MG tablet, Take 2 tablets (50 mg) by mouth daily, Disp: 60 tablet, Rfl: 3     TRAMADOL HCL PO, Take 50 mg by mouth as needed for moderate to severe pain, Disp: , Rfl:      XIFAXAN 550 MG TABS tablet, , Disp: , Rfl:       Allergies:   Review of patient's allergies indicates no known allergies.      Past Medical History:  Hepatocellular carcinoma  Cirrhosis of levier  Enlarged prostate  Inguinal hernia  Liver lesion     Past Surgical History:  Appendectomy   Colonoscopy     Family History:   Liver disease - Brother    S/p cholecystectomy, gallbladder disease  - Sisters x3      Social History:   The patient was accompanied to the appointment by an .   Smoking Status: Former, 0.03 PPD for 20 years; cigarettes and cigars   Smokeless Tobacco: Never   Alcohol Use: No; he has a history of alcohol use     Physical Exam:   /73  Pulse (!) 48  Ht 1.701 m (5' 6.97\")  Wt 82.6 kg (182 lb 3.2 oz)  BMI 28.56 kg/m2     Constitutional: Alert. In no distress.  Head: Normocephalic. No masses, lesions, tenderness or abnormalities.  ENT: No neck nodes or sinus tenderness.  Cardiovascular: RRR. No murmurs, clicks, gallops, or rub.  Respiratory: Clear to auscultation bilaterally, no wheezes or crackles.  Gastrointestinal: Abdomen soft. BS normal. No masses or organomegaly. Right upper quadrant tenderness without " rebound. No flank tenderness.   Musculoskeletal: Extremities normal. No gross deformities noted. Normal muscle tone.  Skin: No suspicious lesions. No rashes.  Neurologic: Gait normal. Reflexes normal and symmetric. Sensation grossly WNL.  Psychiatric: Mentation appears normal. Normal affect.   Hematologic/Lymphatic/Immunologic: Normal cervical lymph nodes.       Assessment and Plan:  Right inguinal hernia  Mildly symptomatic hernia. I would like him to est care with general surgery here. He will take sparing amounts of tramadol; 30 tabs lasted 6 months  - traMADol (ULTRAM) 50 MG tablet  Dispense: 20 tablet; Refill: 0  - GENERAL SURG ADULT REFERRAL    Abdominal pain, right upper quadrant  This could be his gallbladder. We will get appropriate labs and ultrasound. I have sent an [employer] message to his GI physician given that he is on the transplant list to see if there is a particular way they would handle this situation. For now, I will ask him to see a general surgeon for their opinion on his gallbladder and see his hernia.   - CBC with platelets differential  - Comprehensive metabolic panel  - Lipase  - UA with Micro reflex to Culture  - H Pylori antigen stool  - GENERAL SURG ADULT REFERRAL  - US Abdomen Limited    Health care maintenance  Flu shot and shingles vaccine  - FLU VACCINE, INCREASED ANTIGEN, PRESV FREE, AGE 65+ [[id number]]  - ZOSTER VACCINE RECOMBINANT ADJUVANTED IM NJX     Follow-up: post consult          Scribe Disclosure:   I, Jessica Jaime, am serving as a scribe to document services personally performed by Jaswinder Anne MD at this visit, based upon the provider's statements to me. All documentation has been reviewed by the aforementioned provider prior to being entered into the official medical record.     Portions of this medical record were completed by a scribe. UPON MY REVIEW AND AUTHENTICATION BY ELECTRONIC SIGNATURE, this confirms (a) I performed the applicable clinical services, and (b) the  record is accurate.   Jaswinder Anne MD

## 2018-10-24 ENCOUNTER — OFFICE VISIT (OUTPATIENT)
Dept: FAMILY MEDICINE | Facility: CLINIC | Age: 63
End: 2018-10-24
Payer: MEDICARE

## 2018-10-24 VITALS
BODY MASS INDEX: 28.6 KG/M2 | DIASTOLIC BLOOD PRESSURE: 73 MMHG | HEIGHT: 67 IN | HEART RATE: 48 BPM | WEIGHT: 182.2 LBS | SYSTOLIC BLOOD PRESSURE: 134 MMHG

## 2018-10-24 DIAGNOSIS — K40.90 RIGHT INGUINAL HERNIA: Primary | ICD-10-CM

## 2018-10-24 DIAGNOSIS — Z23 NEED FOR PROPHYLACTIC VACCINATION AND INOCULATION AGAINST INFLUENZA: ICD-10-CM

## 2018-10-24 DIAGNOSIS — Z13.6 SCREENING FOR AAA (ABDOMINAL AORTIC ANEURYSM): ICD-10-CM

## 2018-10-24 DIAGNOSIS — Z11.59 NEED FOR HEPATITIS C SCREENING TEST: ICD-10-CM

## 2018-10-24 DIAGNOSIS — R10.11 ABDOMINAL PAIN, RIGHT UPPER QUADRANT: ICD-10-CM

## 2018-10-24 DIAGNOSIS — Z00.00 HEALTH CARE MAINTENANCE: ICD-10-CM

## 2018-10-24 DIAGNOSIS — Z87.891 HISTORY OF SMOKING: ICD-10-CM

## 2018-10-24 LAB
ALBUMIN SERPL-MCNC: 2.5 G/DL (ref 3.4–5)
ALBUMIN UR-MCNC: NEGATIVE MG/DL
ALP SERPL-CCNC: 255 U/L (ref 40–150)
ALT SERPL W P-5'-P-CCNC: 40 U/L (ref 0–70)
ANION GAP SERPL CALCULATED.3IONS-SCNC: 5 MMOL/L (ref 3–14)
APPEARANCE UR: CLEAR
AST SERPL W P-5'-P-CCNC: 48 U/L (ref 0–45)
BASOPHILS # BLD AUTO: 0 10E9/L (ref 0–0.2)
BASOPHILS NFR BLD AUTO: 0.6 %
BILIRUB SERPL-MCNC: 2.5 MG/DL (ref 0.2–1.3)
BILIRUB UR QL STRIP: NEGATIVE
BUN SERPL-MCNC: 9 MG/DL (ref 7–30)
CALCIUM SERPL-MCNC: 8.3 MG/DL (ref 8.5–10.1)
CHLORIDE SERPL-SCNC: 109 MMOL/L (ref 94–109)
CO2 SERPL-SCNC: 24 MMOL/L (ref 20–32)
COLOR UR AUTO: YELLOW
CREAT SERPL-MCNC: 0.58 MG/DL (ref 0.66–1.25)
DIFFERENTIAL METHOD BLD: ABNORMAL
EOSINOPHIL # BLD AUTO: 0.1 10E9/L (ref 0–0.7)
EOSINOPHIL NFR BLD AUTO: 4.4 %
ERYTHROCYTE [DISTWIDTH] IN BLOOD BY AUTOMATED COUNT: 13.5 % (ref 10–15)
GFR SERPL CREATININE-BSD FRML MDRD: >90 ML/MIN/1.7M2
GLUCOSE SERPL-MCNC: 96 MG/DL (ref 70–99)
GLUCOSE UR STRIP-MCNC: NEGATIVE MG/DL
HCT VFR BLD AUTO: 36.2 % (ref 40–53)
HGB BLD-MCNC: 12 G/DL (ref 13.3–17.7)
HGB UR QL STRIP: ABNORMAL
IMM GRANULOCYTES # BLD: 0 10E9/L (ref 0–0.4)
IMM GRANULOCYTES NFR BLD: 0.3 %
KETONES UR STRIP-MCNC: NEGATIVE MG/DL
LEUKOCYTE ESTERASE UR QL STRIP: NEGATIVE
LIPASE SERPL-CCNC: 55 U/L (ref 73–393)
LYMPHOCYTES # BLD AUTO: 1.2 10E9/L (ref 0.8–5.3)
LYMPHOCYTES NFR BLD AUTO: 38.1 %
MCH RBC QN AUTO: 33 PG (ref 26.5–33)
MCHC RBC AUTO-ENTMCNC: 33.1 G/DL (ref 31.5–36.5)
MCV RBC AUTO: 100 FL (ref 78–100)
MONOCYTES # BLD AUTO: 0.3 10E9/L (ref 0–1.3)
MONOCYTES NFR BLD AUTO: 8.5 %
MUCOUS THREADS #/AREA URNS LPF: PRESENT /LPF
NEUTROPHILS # BLD AUTO: 1.5 10E9/L (ref 1.6–8.3)
NEUTROPHILS NFR BLD AUTO: 48.1 %
NITRATE UR QL: NEGATIVE
NRBC # BLD AUTO: 0 10*3/UL
NRBC BLD AUTO-RTO: 0 /100
PH UR STRIP: 5 PH (ref 5–7)
PLATELET # BLD AUTO: 78 10E9/L (ref 150–450)
POTASSIUM SERPL-SCNC: 4.3 MMOL/L (ref 3.4–5.3)
PROT SERPL-MCNC: 6.9 G/DL (ref 6.8–8.8)
RBC # BLD AUTO: 3.64 10E12/L (ref 4.4–5.9)
RBC #/AREA URNS AUTO: 2 /HPF (ref 0–2)
SODIUM SERPL-SCNC: 138 MMOL/L (ref 133–144)
SOURCE: ABNORMAL
SP GR UR STRIP: 1.01 (ref 1–1.03)
SQUAMOUS #/AREA URNS AUTO: <1 /HPF (ref 0–1)
UROBILINOGEN UR STRIP-MCNC: 0 MG/DL (ref 0–2)
WBC # BLD AUTO: 3.2 10E9/L (ref 4–11)
WBC #/AREA URNS AUTO: 1 /HPF (ref 0–5)

## 2018-10-24 RX ORDER — TRAMADOL HYDROCHLORIDE 50 MG/1
TABLET ORAL
Qty: 20 TABLET | Refills: 0 | Status: ON HOLD | OUTPATIENT
Start: 2018-10-24 | End: 2019-01-07

## 2018-10-24 ASSESSMENT — PAIN SCALES - GENERAL: PAINLEVEL: MILD PAIN (3)

## 2018-10-24 NOTE — NURSING NOTE
Chief Complaint   Patient presents with     Establish Care     pt here to establish care     Chest Pain     pt having pain under his right breast, swollen under left breast     Medication Request     pt would like to discuss medications       Ksenia Sher CMA at 9:32 AM on 10/24/2018.

## 2018-10-24 NOTE — NURSING NOTE
Loy Limon      1.  Has the patient received the information for the influenza vaccine? YES    2.  Does the patient have any of the following contraindications?     Allergy to eggs? No     Allergic reaction to previous influenza vaccines? No     Any other problems to previous influenza vaccines? No     Paralyzed by Guillain-Plainfield syndrome? No     Currently pregnant? NO     Current moderate or severe illness? No     Allergy to contact lens solution? No    3.  The vaccine has been administered in the usual fashion and the patient was instructed to wait 20 minutes before leaving the building in the event of an allergic reaction: YES    Vaccination given by Ksenia Sher CMA at 10:40 AM on 10/24/2018.    Recorded by Ksenia Sher

## 2018-10-24 NOTE — PATIENT INSTRUCTIONS
[facility] Medication Refill Request Information:  * Please contact your pharmacy regarding ANY request for medication refills.  ** [facility] Prescription Fax = [phone]  * Please allow 3 business days for routine medication refills.  * Please allow 5 business days for controlled substance medication refills.     [facility] Test Result notification information:  *You will be notified with in 7-10 days of your appointment day regarding the results of your test.  If you are on MyChart you will be notified as soon as the provider has reviewed the results and signed off on them.    [facility]: [phone]

## 2018-10-24 NOTE — MR AVS SNAPSHOT
After Visit Summary   10/24/2018    Loy Limon    MRN: [id number]           Patient Information     Date Of Birth          1955        Visit Information        Provider Department      10/24/2018 9:20 AM Reyes, Rebecca; Jaswinder Anne MD [facility]        Today's Diagnoses     Right inguinal hernia    -  1    Need for hepatitis C screening test        Need for prophylactic vaccination and inoculation against influenza        Screening for AAA (abdominal aortic aneurysm)        History of smoking        Abdominal pain, right upper quadrant        Health care maintenance          Care Instructions    Primary Care Center Medication Refill Request Information:  * Please contact your pharmacy regarding ANY request for medication refills.  ** [facility] Prescription Fax = [phone]  * Please allow 3 business days for routine medication refills.  * Please allow 5 business days for controlled substance medication refills.     Primary Care Center Test Result notification information:  *You will be notified with in 7-10 days of your appointment day regarding the results of your test.  If you are on MyChart you will be notified as soon as the provider has reviewed the results and signed off on them.    [facility]: [phone]             Follow-ups after your visit        Additional Services     GENERAL SURG ADULT REFERRAL       Your provider has referred you to: [facility]: General Surgery Clinic [facility] [phone]   [email or web address]    Please be aware that coverage of these services is subject to the terms and limitations of your health insurance plan.  Call member services at your health plan with any benefit or coverage questions.      Please bring the following with you to your appointment:    (1) Any X-Rays, CTs or MRIs which have been performed.  Contact the facility where they were done to arrange for  prior to  your scheduled appointment.   (2) List of current medications   (3) This referral request   (4) Any documents/labs given to you for this referral                  Your next 10 appointments already scheduled     Oct 24, 2018 11:20 AM CDT   LAB with  LAB   [facility] ([facility])    [address] [place] [id number] [phone]           Please do not eat 10-12 hours before your appointment if you are coming in fasting for labs on lipids, cholesterol, or glucose (sugar). This does not apply to pregnant women. Water, hot tea and black coffee (with nothing added) are okay. Do not drink other fluids, diet soda or chew gum.            Oct 31, 2018  8:30 AM CDT   US ABDOMEN LIMITED with UCUS1   [facility] ([facility])    [address] [place] [id number] [phone]           How do I prepare for my exam? (Food and drink instructions) Adults: No eating, smoking, gum chewing or drinking for 8 hours before the exam. You may take medicine with a small sip of water.  Children: * Infants, breast-fed: may have breast milk up to 2 hours before exam. * Infants, formula: may have bottle until 4 hours before exam. * Children 1-5 years: No food or drink for 4 hours before exam. * Children 6 -12 years: No food or drink for 6 hours before exam. * Children over 12 years: No food or drink for 8 hours before exam.  * J Tube Fed: No need to stop feedings.  What should I wear: Wear comfortable clothes.  How long does the exam take: Most ultrasounds take 30 to 60 minutes.  What should I bring: Bring a list of your medicines, including vitamins, minerals and over-the-counter drugs. It is safest to leave personal items at home.  Do I need a :  No  is needed.  What do I need to tell my doctor: Tell your doctor about any allergies you may have.  What should I do after the exam: No restrictions, You may resume  normal activities.  What is this test: An ultrasound uses sound waves to make pictures of the body. Sound waves do not cause pain. The only discomfort may be the pressure of the wand against your skin or full bladder.  Who should I call with questions: If you have any questions, please call the Imaging Department where you will have your exam. Directions, parking instructions, and other information is available on our website, [email or web address].            Nov 07, 2018  8:50 AM CST   (Arrive by 8:35 AM)   Return Visit with Jaswinder Anne MD   [facility] ([facility])    [address] [place] [address]  4th Floor  [facility] [address]   [phone]            Nov 14, 2018  8:00 AM CST   Lab with  LAB   [facility] ([facility])    [address] [place] [address]   [phone]            Nov 14, 2018  9:00 AM CST   (Arrive by 8:45 AM)   Return General Liver with Soledad Skelton PA-C   [facility] ([facility])    [address] [place] [address]  [address]  [facility] [address] [phone]            Dec 04, 2018  2:00 PM CST   (Arrive by 1:45 PM)   Return Visit with Pauline Clancy MD   [facility] ([facility])    [address] [place] [address]  [address]  [facility] [address] [phone]            Jan 02, 2019  6:45 AM CST   Lab with  LAB   [facility] ([facility])    [address] [place] [address]   [phone]            Jan 02, 2019  7:00 AM CST   MR ABDOMEN W/O & W CONTRAST with [address] [facility] [place] [facility] ([facility])    [address] [place] [id number] [phone]           How do I prepare for my exam? (Food and drink instructions) Do not eat or drink for 6 hours prior to exam. **If you will be  receiving sedation or general anesthesia, please see special notes below.**  How do I prepare for my exam? (Other instructions) Take your medicines as usual, unless your doctor tells you not to. You may or may not receive IV contrast for this exam pending the discretion of the Radiologist.  **If you will be receiving sedation or general anesthesia, please see special notes below.**  What should I wear: The MRI machine uses a strong magnet. Please wear clothes without metal (snaps, zippers). A sweatsuit works well, or we may give you a hospital gown. Please remove any body piercings and hair extensions before you arrive. You will also remove watches, jewelry, hairpins, wallets, dentures, partial dental plates and hearing aids. You may wear contact lenses, and you may be able to wear your rings. We have a safe place to keep your personal items, but it is safer to leave them at home.  How long does the exam take: Most tests take 30 to 60 minutes.  HOWEVER, IF YOUR DOCTOR PRESCRIBES ANESTHESIA please plan on spending four to five hours in the recovery room.  What should I bring: Bring a list of your current medicines to your exam (including vitamins, minerals and over-the-counter drugs). Also bring the results of similar scans you may have had.  Do I need a : **If you will be receiving sedation or general anesthesia, please see special notes below.**  What should I do after the exam: No Restrictions, You may resume normal activities.  What is this test: MRI (magnetic resonance imaging) uses a strong magnet and radio waves to look inside the body. An MRA (magnetic resonance angiogram) does the same thing, but it lets us look at your blood vessels. A computer turns the radio waves into pictures showing cross sections of the body, much like slices of bread. This helps us see any problems more clearly. You may receive fluid (called  contrast ) before or during your scan. The fluid helps us see the pictures better. We  "give the fluid through an IV (small needle in your arm).  Who should I call with questions: If you have any questions, please contact your Imaging Department exam site. Directions, parking instructions, and other information is available on our website, [email or web address].            Ziyad 10, 2019  3:30 PM CST   (Arrive by 3:15 PM)   Return Visit with Hi Melgar MD   [facility] ([facility])    [address]  [address]  [facility] [id number] [phone]            Mar 01, 2019 10:30 AM CST   (Arrive by 10:15 AM)   Return Visit with Khloe Torres MD   [facility] ([facility])    [address]  4th Floor  [facility] [id number] [phone]              Future tests that were ordered for you today     Open Future Orders        Priority Expected Expires Ordered    Lipase Routine 10/24/2018 11/7/2018 10/24/2018    UA with Micro reflex to Culture Routine 10/24/2018 10/24/2019 10/24/2018    H Pylori antigen stool Routine 10/24/2018 10/24/2019 10/24/2018    US Abdomen Limited Routine  10/24/2019 10/24/2018    CBC with platelets differential Routine 10/24/2018 11/7/2018 10/24/2018    Comprehensive metabolic panel Routine 10/24/2018 11/7/2018 10/24/2018            Who to contact     Please call your clinic at [phone] to:    Ask questions about your health    Make or cancel appointments    Discuss your medicines    Learn about your test results    Speak to your doctor            Additional Information About Your Visit        Care EveryWhere ID     This is your Care EveryWhere ID. This could be used by other organizations to access your [facility] medical records  [id number]        Your Vitals Were     Pulse Height BMI (Body Mass Index)             48 1.701 m (5' 6.97\") 28.56 kg/m2          Blood Pressure from Last 3 Encounters:   10/24/18 134/73   10/12/18 135/66 "   10/04/18 144/75    Weight from Last 3 Encounters:   10/24/18 82.6 kg (182 lb 3.2 oz)   10/12/18 83.5 kg (184 lb)   10/04/18 83.9 kg (185 lb)              We Performed the Following     FLU VACCINE, INCREASED ANTIGEN, PRESV FREE, AGE 65+ [57367]     GENERAL SURG ADULT REFERRAL     ZOSTER VACCINE RECOMBINANT ADJUVANTED IM NJX          Today's Medication Changes          These changes are accurate as of 10/24/18 10:50 AM.  If you have any questions, ask your nurse or doctor.               These medicines have changed or have updated prescriptions.        Dose/Directions    * TRAMADOL HCL PO   This may have changed:  Another medication with the same name was added. Make sure you understand how and when to take each.   Changed by:  Jaswinder Anne MD        Dose:  50 mg   Take 50 mg by mouth as needed for moderate to severe pain   Refills:  0       * traMADol 50 MG tablet   Commonly known as:  ULTRAM   This may have changed:  You were already taking a medication with the same name, and this prescription was added. Make sure you understand how and when to take each.   Used for:  Right inguinal hernia   Changed by:  Jaswinder Anne MD        Take one po bid prn severe pain   Quantity:  20 tablet   Refills:  0       * Notice:  This list has 2 medication(s) that are the same as other medications prescribed for you. Read the directions carefully, and ask your doctor or other care provider to review them with you.         Where to get your medicines      Some of these will need a paper prescription and others can be bought over the counter.  Ask your nurse if you have questions.     Bring a paper prescription for each of these medications     traMADol 50 MG tablet               Information about OPIOIDS     PRESCRIPTION OPIOIDS: WHAT YOU NEED TO KNOW   We gave you an opioid (narcotic) pain medicine. It is important to manage your pain, but opioids are not always the best choice. You should first try all the other  options your care team gave you. Take this medicine for as short a time (and as few doses) as possible.    Some activities can increase your pain, such as bandage changes or therapy sessions. It may help to take your pain medicine 30 to 60 minutes before these activities. Reduce your stress by getting enough sleep, working on hobbies you enjoy and practicing relaxation or meditation. Talk to your care team about ways to manage your pain beyond prescription opioids.    These medicines have risks:    DO NOT drive when on new or higher doses of pain medicine. These medicines can affect your alertness and reaction times, and you could be arrested for driving under the influence (DUI). If you need to use opioids long-term, talk to your care team about driving.    DO NOT operate heavy machinery    DO NOT do any other dangerous activities while taking these medicines.    DO NOT drink any alcohol while taking these medicines.     If the opioid prescribed includes acetaminophen, DO NOT take with any other medicines that contain acetaminophen. Read all labels carefully. Look for the word  acetaminophen  or  Tylenol.  Ask your pharmacist if you have questions or are unsure.    You can get addicted to pain medicines, especially if you have a history of addiction (chemical, alcohol or substance dependence). Talk to your care team about ways to reduce this risk.    All opioids tend to cause constipation. Drink plenty of water and eat foods that have a lot of fiber, such as fruits, vegetables, prune juice, apple juice and high-fiber cereal. Take a laxative (Miralax, milk of magnesia, Colace, Senna) if you don t move your bowels at least every other day. Other side effects include upset stomach, sleepiness, dizziness, throwing up, tolerance (needing more of the medicine to have the same effect), physical dependence and slowed breathing.    Store your pills in a secure place, locked if possible. We will not replace any lost or  stolen medicine. If you don t finish your medicine, please throw away (dispose) as directed by your pharmacist. The [place] Pollution Control Agency has more information about safe disposal: [email or web address]-medications         Primary Care Provider Office Phone # Fax #    Jaswinder Anne [phone]       [address] [address]        Equal Access to Services     DAVID COWAN : Hadii aad ku hadasho Soomaali, waaxda luqadaha, qaybta kaalmada adeegyada, waxay idiin hayaan adeeg kharash la'aan . So [facility] [phone].    ATENCIÓN: Si habla español, tiene a samaniego disposición servicios gratuitos de asistencia lingüística. [facility] [phone].    We comply with applicable federal civil rights laws and [place] laws. We do not discriminate on the basis of race, color, national origin, age, disability, sex, sexual orientation, or gender identity.            Thank you!     Thank you for choosing [facility]  for your care. Our goal is always to provide you with excellent care. Hearing back from our patients is one way we can continue to improve our services. Please take a few minutes to complete the written survey that you may receive in the mail after your visit with us. Thank you!             Your Updated Medication List - Protect others around you: Learn how to safely use, store and throw away your medicines at www.disposemymeds.org.          This list is accurate as of 10/24/18 10:50 AM.  Always use your most recent med list.                   Brand Name Dispense Instructions for use Diagnosis    alfuzosin 10 MG 24 hr tablet    UROXATRAL     Take 10 mg by mouth daily    Alcoholic cirrhosis of liver without ascites (H), HCC (hepatocellular carcinoma) (H)       furosemide 20 MG tablet    LASIX    60 tablet    Take 1 tablet (20 mg) by mouth daily    Alcoholic cirrhosis of liver without ascites (H), HCC (hepatocellular  carcinoma) (H), Edema, unspecified type       NADOLOL PO      Take 20 mg by mouth daily        omeprazole 20 MG CR capsule    priLOSEC     Take 20 mg by mouth        rifampin 300 MG capsule    RIFADIN    60 capsule    Take 2 capsules (600 mg) by mouth daily    Latent tuberculosis infection       spironolactone 25 MG tablet    ALDACTONE    60 tablet    Take 2 tablets (50 mg) by mouth daily    Alcoholic cirrhosis of liver without ascites (H), HCC (hepatocellular carcinoma) (H), Edema, unspecified type       * TRAMADOL HCL PO      Take 50 mg by mouth as needed for moderate to severe pain        * traMADol 50 MG tablet    ULTRAM    20 tablet    Take one po bid prn severe pain    Right inguinal hernia       XIFAXAN 550 MG Tabs tablet   Generic drug:  rifaximin           * Notice:  This list has 2 medication(s) that are the same as other medications prescribed for you. Read the directions carefully, and ask your doctor or other care provider to review them with you.

## 2018-10-26 ENCOUNTER — DOCUMENTATION ONLY (OUTPATIENT)
Dept: TRANSPLANT | Facility: CLINIC | Age: 63
End: 2018-10-26

## 2018-10-26 ENCOUNTER — HOSPITAL ENCOUNTER (EMERGENCY)
Facility: CLINIC | Age: 63
Discharge: HOME | End: 2018-10-26
Attending: EMERGENCY MEDICINE | Admitting: EMERGENCY MEDICINE
Payer: MEDICARE

## 2018-10-26 ENCOUNTER — TELEPHONE (OUTPATIENT)
Dept: TRANSPLANT | Facility: CLINIC | Age: 63
End: 2018-10-26

## 2018-10-26 VITALS
TEMPERATURE: 98.2 F | DIASTOLIC BLOOD PRESSURE: 78 MMHG | SYSTOLIC BLOOD PRESSURE: 148 MMHG | OXYGEN SATURATION: 95 % | BODY MASS INDEX: 28.54 KG/M2 | HEIGHT: 67 IN | HEART RATE: 60 BPM | RESPIRATION RATE: 14 BRPM

## 2018-10-26 DIAGNOSIS — K40.91 UNILATERAL RECURRENT INGUINAL HERNIA WITHOUT OBSTRUCTION OR GANGRENE: ICD-10-CM

## 2018-10-26 PROCEDURE — 99284 EMERGENCY DEPT VISIT MOD MDM: CPT | Mod: Z6 | Performed by: EMERGENCY MEDICINE

## 2018-10-26 PROCEDURE — 25000128 H RX IP 250 OP 636: Performed by: EMERGENCY MEDICINE

## 2018-10-26 PROCEDURE — 96374 THER/PROPH/DIAG INJ IV PUSH: CPT | Performed by: EMERGENCY MEDICINE

## 2018-10-26 PROCEDURE — 96375 TX/PRO/DX INJ NEW DRUG ADDON: CPT | Performed by: EMERGENCY MEDICINE

## 2018-10-26 PROCEDURE — 99285 EMERGENCY DEPT VISIT HI MDM: CPT | Mod: 25 | Performed by: EMERGENCY MEDICINE

## 2018-10-26 RX ORDER — HYDROMORPHONE HYDROCHLORIDE 1 MG/ML
0.5 INJECTION, SOLUTION INTRAMUSCULAR; INTRAVENOUS; SUBCUTANEOUS
Status: DISCONTINUED | OUTPATIENT
Start: 2018-10-26 | End: 2018-10-26 | Stop reason: HOSPADM

## 2018-10-26 RX ORDER — ONDANSETRON 2 MG/ML
4 INJECTION INTRAMUSCULAR; INTRAVENOUS EVERY 30 MIN PRN
Status: DISCONTINUED | OUTPATIENT
Start: 2018-10-26 | End: 2018-10-26 | Stop reason: HOSPADM

## 2018-10-26 RX ADMIN — Medication 0.5 MG: at 10:18

## 2018-10-26 RX ADMIN — ONDANSETRON 4 MG: 2 INJECTION INTRAMUSCULAR; INTRAVENOUS at 10:05

## 2018-10-26 RX ADMIN — Medication 0.5 MG: at 10:10

## 2018-10-26 NOTE — PROGRESS NOTES
Review with FIFI Obrien and Dr. Harris with recommendation for patient to be seen in txp surgery clinic to discuss reduction of hernia. Was able to be reduced in the ED today.    Request to scheduling sent.

## 2018-10-26 NOTE — TELEPHONE ENCOUNTER
"8:03 AM  October 26, 2018  Returned Mr. Limon's call via [facility] .  He c/o severe abdominal pain from his hernia.  He has been unable to reduce the hernia this morning and \"pain medication doesn't help.\" I told him I would have his coordinator call him first thing this morning or he could go to the ER.  He stated he would wait for her call.  Sofi Rivero RN, CCTC  On Call Organ Coordinator    "

## 2018-10-26 NOTE — ED TRIAGE NOTES
Pt presents ambulatory to triage with c/o severe abdominal pain that started this morning. Pt reports known hernia.

## 2018-10-26 NOTE — DISCHARGE INSTRUCTIONS
Wear a truss that you can get at your local pharmacy (script supplied) to prevent the hernia from bulging out again.    Should your hernia bulge out and you are not able to push it back in, return to the ER.    Please follow up with Dr. Harris in transplant clinic as soon as possible.

## 2018-10-26 NOTE — ED AVS SNAPSHOT
[facility], Emergency Department    [address] [address]    Phone:  [phone]                                       Loy Limon   MRN: [id number]    Department:  [facility], [place], Emergency Department   Date of Visit:  10/26/2018           Patient Information     Date Of Birth          1955        Your diagnoses for this visit were:     Unilateral recurrent inguinal hernia without obstruction or gangrene right, reduced in ER       You were seen by Shun Mcgarry MD.      Follow-up Information     Follow up with Jovanni Harris MD Today.    Specialty:  Transplant    Contact information:    [address]  [facility] [address] [phone]          Discharge Instructions       Wear a truss that you can get at your local pharmacy (script supplied) to prevent the hernia from bulging out again.    Should your hernia bulge out and you are not able to push it back in, return to the ER.    Please follow up with Dr. Harris in transplant clinic as soon as possible.    Discharge References/Attachments     HERNIA, WHAT IS A (Congolese)      Your next 10 appointments already scheduled     Oct 31, 2018  8:30 AM CDT   US ABDOMEN LIMITED with UCUS1   [facility] US ([facility])    [address]  1st Floor  [facility] [address] [phone]           How do I prepare for my exam? (Food and drink instructions) Adults: No eating, smoking, gum chewing or drinking for 8 hours before the exam. You may take medicine with a small sip of water.  Children: * Infants, breast-fed: may have breast milk up to 2 hours before exam. * Infants, formula: may have bottle until 4 hours before exam. * Children 1-5 years: No food or drink for 4 hours before exam. * Children 6 -12 years: No food or drink for 6 hours before exam. * Children over 12 years: No food or drink for 8 hours before exam.  * J Tube Fed: No need to stop feedings.  What should I  wear: Wear comfortable clothes.  How long does the exam take: Most ultrasounds take 30 to 60 minutes.  What should I bring: Bring a list of your medicines, including vitamins, minerals and over-the-counter drugs. It is safest to leave personal items at home.  Do I need a :  No  is needed.  What do I need to tell my doctor: Tell your doctor about any allergies you may have.  What should I do after the exam: No restrictions, You may resume normal activities.  What is this test: An ultrasound uses sound waves to make pictures of the body. Sound waves do not cause pain. The only discomfort may be the pressure of the wand against your skin or full bladder.  Who should I call with questions: If you have any questions, please call the Imaging Department where you will have your exam. Directions, parking instructions, and other information is available on our website, [email or web address].            Nov 07, 2018  8:50 AM CST   (Arrive by 8:35 AM)   Return Visit with Jaswinder Anne MD   [facility] ([facility])    [address] [place] [address]  [address] [facility] [address]   [phone]            Nov 14, 2018  8:00 AM CST   Lab with  LAB   [facility] Lab ([facility])    [address] [place] [address]  [address]st [facility] [address]   [phone]            Nov 14, 2018  9:00 AM CST   (Arrive by 8:45 AM)   Return General Liver with Soledad Skelton PA-C   [facility] Hepatology ([facility])    [address] [place] [address]  [address]  [facility] [address]   [phone]            Dec 04, 2018  2:00 PM CST   (Arrive by 1:45 PM)   Return Visit with Pauline Clancy MD   [facility] and Infectious Diseases ([facility])    [address] [place] [address]  [address]  [facility] [address] [phone]            Jan 02, 2019  6:45 AM CST   Lab with  LAB    Health Lab ([facility])    [address] [address]   [phone]            Jan 02, 2019  7:00 AM CST   MR ABDOMEN W/O & W CONTRAST with UC1   [facility] MRI ([email or web address] and Surgery [place])    [address] [place] [address]   [phone]           How do I prepare for my exam? (Food and drink instructions) Do not eat or drink for 6 hours prior to exam. **If you will be receiving sedation or general anesthesia, please see special notes below.**  How do I prepare for my exam? (Other instructions) Take your medicines as usual, unless your doctor tells you not to. You may or may not receive IV contrast for this exam pending the discretion of the Radiologist.  **If you will be receiving sedation or general anesthesia, please see special notes below.**  What should I wear: The MRI machine uses a strong magnet. Please wear clothes without metal (snaps, zippers). A sweatsuit works well, or we may give you a hospital gown. Please remove any body piercings and hair extensions before you arrive. You will also remove watches, jewelry, hairpins, wallets, dentures, partial dental plates and hearing aids. You may wear contact lenses, and you may be able to wear your rings. We have a safe place to keep your personal items, but it is safer to leave them at home.  How long does the exam take: Most tests take 30 to 60 minutes.  HOWEVER, IF YOUR DOCTOR PRESCRIBES ANESTHESIA please plan on spending four to five hours in the recovery room.  What should I bring: Bring a list of your current medicines to your exam (including vitamins, minerals and over-the-counter drugs). Also bring the results of similar scans you may have had.  Do I need a : **If you will be receiving sedation or general anesthesia, please see special notes below.**  What should I do after the exam: No Restrictions, You may resume normal activities.  What is this test: MRI (magnetic  resonance imaging) uses a strong magnet and radio waves to look inside the body. An MRA (magnetic resonance angiogram) does the same thing, but it lets us look at your blood vessels. A computer turns the radio waves into pictures showing cross sections of the body, much like slices of bread. This helps us see any problems more clearly. You may receive fluid (called  contrast ) before or during your scan. The fluid helps us see the pictures better. We give the fluid through an IV (small needle in your arm).  Who should I call with questions: If you have any questions, please contact your Imaging Department exam site. Directions, parking instructions, and other information is available on our website, [email or web address].            Ziyad 10, 2019  3:30 PM CST   (Arrive by 3:15 PM)   Return Visit with Hi Melgar MD   [facility] ([facility] [place])    [address]  [address]  [facility] [id number] [phone]            Mar 01, 2019 10:30 AM CST   (Arrive by 10:15 AM)   Return Visit with Khloe Torres MD   [facility] ([facility])    [address]  4th Floor  [facility] [id number] [phone]              24 Hour Appointment Hotline       To make an appointment at any [facility] clinic, call [phone] ([phone]). If you don't have a family doctor or clinic, we will help you find one. [facility] clinics are conveniently located to serve the needs of you and your family.          ED Discharge Orders     TRUSS, ADDN TO STD PAD, SCROTAL                    Review of your medicines      Our records show that you are taking the medicines listed below. If these are incorrect, please call your family doctor or clinic.        Dose / Directions Last dose taken    alfuzosin 10 MG 24 hr tablet   Commonly known as:  UROXATRAL   Dose:  10 mg        Take 10 mg by mouth daily    Refills:  0        furosemide 20 MG tablet   Commonly known as:  LASIX   Dose:  20 mg   Quantity:  60 tablet        Take 1 tablet (20 mg) by mouth daily   Refills:  3        NADOLOL PO   Dose:  20 mg        Take 20 mg by mouth daily   Refills:  0        omeprazole 20 MG CR capsule   Commonly known as:  priLOSEC   Dose:  20 mg        Take 20 mg by mouth   Refills:  0        rifampin 300 MG capsule   Commonly known as:  RIFADIN   Dose:  600 mg   Quantity:  60 capsule        Take 2 capsules (600 mg) by mouth daily   Refills:  3        spironolactone 25 MG tablet   Commonly known as:  ALDACTONE   Dose:  50 mg   Quantity:  60 tablet        Take 2 tablets (50 mg) by mouth daily   Refills:  3        * TRAMADOL HCL PO   Dose:  50 mg        Take 50 mg by mouth as needed for moderate to severe pain   Refills:  0        * traMADol 50 MG tablet   Commonly known as:  ULTRAM   Quantity:  20 tablet        Take one po bid prn severe pain   Refills:  0        XIFAXAN 550 MG Tabs tablet   Generic drug:  rifaximin        Refills:  0        * Notice:  This list has 2 medication(s) that are the same as other medications prescribed for you. Read the directions carefully, and ask your doctor or other care provider to review them with you.            Procedures and tests performed during your visit     Peripheral IV catheter    Pulse oximetry nursing      Orders Needing Specimen Collection     None      Pending Results     No orders found from 10/24/2018 to 10/27/2018.            Pending Culture Results     No orders found from 10/24/2018 to 10/27/2018.            Pending Results Instructions     If you had any lab results that were not finalized at the time of your Discharge, you can call the ED Lab Result RN at [phone]. You will be contacted by this team for any positive Lab results or changes in treatment. The nurses are available 7 days a week from 10A to 6:30P.  You can leave a message 24 hours per day and they will return your  call.        Thank you for choosing [facility]       Thank you for choosing [facility] for your care. Our goal is always to provide you with excellent care. Hearing back from our patients is one way we can continue to improve our services. Please take a few minutes to complete the written survey that you may receive in the mail after you visit with us. Thank you!        Care EveryWhere ID     This is your Care EveryWhere ID. This could be used by other organizations to access your [facility] medical records  [id number]        Equal Access to Services     DAVID COWAN : Hadii kostas naiko Sodaphney, waaxda luqadaha, qaybta kaalmada adeshamayadeni, javier rico . So [facility] [phone].    ATENCIÓN: Si habla español, tiene a samaniego disposición servicios gratuitos de asistencia lingüística. Earle al [phone].    We comply with applicable federal civil rights laws and [place] laws. We do not discriminate on the basis of race, color, national origin, age, disability, sex, sexual orientation, or gender identity.            After Visit Summary       This is your record. Keep this with you and show to your community pharmacist(s) and doctor(s) at your next visit.

## 2018-10-26 NOTE — Clinical Note
Please schedule and call patient to see txp surgeon for possible inguinal hernia repair. You can inform the patient it is follow up from his ED visit. Thanks, Becky

## 2018-10-26 NOTE — ED NOTES
Bed: IN01  Expected date:   Expected time:   Means of arrival:   Comments:  [place]  Korean speaking  Incarcerated inguinal hernia  Cannot reduce it, very swollen, lots of pain

## 2018-10-26 NOTE — TELEPHONE ENCOUNTER
Spoke with Mr. Limon's with aid of [facility] . He is having extreme pain and can not reduce his inguinal hernia. Testicle is extremely swollen. Agrees to come now to ED here.

## 2018-10-26 NOTE — ED PROVIDER NOTES
History     Chief Complaint   Patient presents with     Abdominal Pain     HPI  Loy Limon is a 63 year old male with a history of inguinal hernia, hepatocellular carcinoma, cirrhosis of liver who presents to the Emergency Department for the evaluation of an inguinal hernia which he is unable to reduce, causing testicular swelling and pain of the right groin and lower abdominal region that started at 5 AM this morning.  Patient states he has had the hernia for approximately 3 years but states he does not have a surgeon that follows him for it.  The patient apparently was sent from the clinic to the ER for evaluation.  Patient denies any nausea or vomiting.    I have reviewed the Medications, Allergies, Past Medical and Surgical History, and Social History in the [employer] system.    Past Medical History:   Diagnosis Date     Cancer (H)     hepatocellualr carcinoma     Cirrhosis of liver (H) 5/8/2018     Enlarged prostate      Inguinal hernia      Liver lesion 5/8/2018     Previous Medications    ALFUZOSIN (UROXATRAL) 10 MG 24 HR TABLET    Take 10 mg by mouth daily    FUROSEMIDE (LASIX) 20 MG TABLET    Take 1 tablet (20 mg) by mouth daily    NADOLOL PO    Take 20 mg by mouth daily    OMEPRAZOLE (PRILOSEC) 20 MG CR CAPSULE    Take 20 mg by mouth    RIFAMPIN (RIFADIN) 300 MG CAPSULE    Take 2 capsules (600 mg) by mouth daily    SPIRONOLACTONE (ALDACTONE) 25 MG TABLET    Take 2 tablets (50 mg) by mouth daily    TRAMADOL (ULTRAM) 50 MG TABLET    Take one po bid prn severe pain    TRAMADOL HCL PO    Take 50 mg by mouth as needed for moderate to severe pain    XIFAXAN 550 MG TABS TABLET         No Known Allergies     Social History     Social History     Marital status:      Spouse name: N/A     Number of children: N/A     Years of education: N/A     Occupational History     Not on file.     Social History Main Topics     Smoking status: Former Smoker     Packs/day: 0.03     Years: 20.00     Types: Cigarettes,  "Cigars     Start date: 8/14/1970     Quit date: 3/14/2018     Smokeless tobacco: Never Used      Comment: Quit smoking Feb 2018, one cigarette after dinner     Alcohol use No      Comment: Quit drinking Feb 2018     Drug use: No     Sexual activity: Not on file     Other Topics Concern     Not on file     Social History Narrative    Born in [place], came to [place] ago.     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cardboard, trimming meats     Past Surgical History:   Procedure Laterality Date     APPENDECTOMY       COLONOSCOPY      2018     Family History   Problem Relation Age of Onset     Liver Disease Brother      Review of Systems   All other systems reviewed and are negative.      Physical Exam   BP: 179/71  Pulse: 56  Temp: 98.2  F (36.8  C)  Resp: 16  Height: 170.2 cm (5' 7\")  SpO2: 95 %      Physical Exam   Constitutional: He is oriented to person, place, and time. He appears well-nourished.   Elderly ambulatory conversant in South Sudanese   HENT:   Head: Atraumatic.   Eyes: EOM are normal. Pupils are equal, round, and reactive to light.   Neck: Neck supple.   Cardiovascular: Normal heart sounds.    Pulmonary/Chest: Breath sounds normal.   Abdominal: Soft. There is no tenderness.   There is a significant right sided inguinal hernia extending down into the scrotum   Genitourinary:   Genitourinary Comments: Testicles normal   Musculoskeletal: He exhibits no edema or deformity.   Neurological: He is alert and oriented to person, place, and time. No cranial nerve deficit.   Grossly intact and symmetric   Skin: Skin is warm.   Psychiatric: He has a normal mood and affect.       ED Course     ED Course     Procedures           Patient had an IV placed for IV Dilaudid and was placed on oximetry.    Patient was put in reverse Trendelenburg.    Medications   sodium chloride (PF) 0.9% PF flush 3 mL (not administered)   sodium chloride (PF) 0.9% PF flush 3 mL (not administered)   HYDROmorphone (PF) (DILAUDID) injection 0.5 mg (0.5 " mg Intravenous Given 10/26/18 1018)   ondansetron (ZOFRAN) injection 4 mg (4 mg Intravenous Given 10/26/18 1005)     Gentle pressure was applied to the right inguinal hernia and after about 15-20 minutes the hernia was completely reduced.  Patient required two 0.5 mg IV Dilaudid doses during the procedure to tolerate the pressure.    Transplant surgery had been involved over the phone and the case was discussed with Dr. Harris.  The patient has no surgeon and mine to monitor or further address his hernia issues, therefore the patient will be sent back to the transplant clinic so they can coordinate his hernia care.  In the meantime the patient will be given a prescription for a truss that is unavailable here at the hospital but can be picked up at [facility] or [facility].        Assessments & Plan (with Medical Decision Making)     I have reviewed the nursing notes.    I have reviewed the findings, diagnosis, plan and need for follow up with the patient.    Final diagnoses:   Unilateral recurrent inguinal hernia without obstruction or gangrene - right, reduced in ER     Wear a truss that you can get at your local pharmacy (script supplied) to prevent the hernia from bulging out again.    Should your hernia bulge out and you are not able to push it back in, return to the ER.    Please follow up with Dr. Harris in transplant clinic as soon as possible.    Routine information regarding the above diagnosis was given.    Shun Mcgarry MD    10/26/2018   [facility], [place], EMERGENCY DEPARTMENT     Shun Mcgarry MD  10/26/18 9157

## 2018-10-26 NOTE — ED AVS SNAPSHOT
[facility], [place], Emergency Department    [address] [address]    Phone:  [phone]                                       Loy Limon   MRN: [id number]    Department:  [facility], Emergency Department   Date of Visit:  10/26/2018           After Visit Summary Signature Page     I have received my discharge instructions, and my questions have been answered. I have discussed any challenges I see with this plan with the nurse or doctor.    ..........................................................................................................................................  Patient/Patient Representative Signature      ..........................................................................................................................................  Patient Representative Print Name and Relationship to Patient    ..................................................               ................................................  Date                                   Time    ..........................................................................................................................................  Reviewed by Signature/Title    ...................................................              ..............................................  Date                                               Time          22EPIC Rev 08/18

## 2018-10-31 ENCOUNTER — RADIANT APPOINTMENT (OUTPATIENT)
Dept: ULTRASOUND IMAGING | Facility: CLINIC | Age: 63
End: 2018-10-31
Attending: FAMILY MEDICINE
Payer: MEDICARE

## 2018-10-31 DIAGNOSIS — R10.11 ABDOMINAL PAIN, RIGHT UPPER QUADRANT: ICD-10-CM

## 2018-10-31 PROCEDURE — 87338 HPYLORI STOOL AG IA: CPT | Performed by: FAMILY MEDICINE

## 2018-11-01 LAB
H PYLORI AG STL QL IA: NORMAL
SPECIMEN SOURCE: NORMAL

## 2018-11-05 ENCOUNTER — OFFICE VISIT (OUTPATIENT)
Dept: TRANSPLANT | Facility: CLINIC | Age: 63
End: 2018-11-05
Attending: TRANSPLANT SURGERY
Payer: MEDICARE

## 2018-11-05 VITALS
HEIGHT: 67 IN | OXYGEN SATURATION: 96 % | TEMPERATURE: 97.6 F | BODY MASS INDEX: 29 KG/M2 | HEART RATE: 52 BPM | WEIGHT: 184.8 LBS | SYSTOLIC BLOOD PRESSURE: 143 MMHG | DIASTOLIC BLOOD PRESSURE: 72 MMHG

## 2018-11-05 DIAGNOSIS — K74.60 CIRRHOSIS OF LIVER WITHOUT ASCITES, UNSPECIFIED HEPATIC CIRRHOSIS TYPE (H): Primary | ICD-10-CM

## 2018-11-05 PROCEDURE — G0463 HOSPITAL OUTPT CLINIC VISIT: HCPCS | Mod: ZF

## 2018-11-05 PROCEDURE — T1013 SIGN LANG/ORAL INTERPRETER: HCPCS | Mod: U3,ZF

## 2018-11-05 ASSESSMENT — PAIN SCALES - GENERAL: PAINLEVEL: NO PAIN (0)

## 2018-11-05 NOTE — NURSING NOTE
"Chief Complaint   Patient presents with     Follow Up For     possible inguinal hernia repair     /72  Pulse 52  Temp 97.6  F (36.4  C)  Ht 1.702 m (5' 7\")  Wt 83.8 kg (184 lb 12.8 oz)  SpO2 96%  BMI 28.94 kg/m2    Katherine Srinivasan LPN    "

## 2018-11-05 NOTE — LETTER
"11/5/2018      RE: Loy Limon  [address]  [facility] [address]       HPI      ROS      Physical Exam    Patient is active on the liver transplant list. Now has a painful right inguinal hernia. Recently went to ED with irreducibility    /72  Pulse 52  Temp 97.6  F (36.4  C)  Ht 1.702 m (5' 7\")  Wt 83.8 kg (184 lb 12.8 oz)  SpO2 96%  BMI 28.94 kg/m2;s    Local exam:    Large indirect inguinal hernia partially reducible    Impression:  HCC with Cirrhosis of liver    Right inguinal hernia    Plan: mesh repair of inguinal hernia    Risk benefits and alternatives of mesh repair of hernia explained.    Emil Echevarria MD      "

## 2018-11-05 NOTE — PROGRESS NOTES
"HPI      ROS      Physical Exam    Patient is active on the liver transplant list. Now has a painful right inguinal hernia. Recently went to ED with irreducibility    /72  Pulse 52  Temp 97.6  F (36.4  C)  Ht 1.702 m (5' 7\")  Wt 83.8 kg (184 lb 12.8 oz)  SpO2 96%  BMI 28.94 kg/m2;s    Local exam:    Large indirect inguinal hernia partially reducible    Impression:  HCC with Cirrhosis of liver    Right inguinal hernia    Plan: mesh repair of inguinal hernia    Risk benefits and alternatives of mesh repair of hernia explained.  "

## 2018-11-05 NOTE — MR AVS SNAPSHOT
After Visit Summary   11/5/2018    Loy Limon    MRN: [id number]           Patient Information     Date Of Birth          1955        Visit Information        Provider Department      11/5/2018 10:35 AM Glenys Mendes; Emil Echevarria MD [facility]        Today's Diagnoses     Cirrhosis of liver without ascites, unspecified hepatic cirrhosis type (H)    -  1       Follow-ups after your visit        Your next 10 appointments already scheduled     Nov 07, 2018  8:35 AM CST   Return Visit with Jaswinder Anne MD   [facility] ([facility])    [address] [place] [address]  4th Floor  [facility] [address]   [phone]            Nov 14, 2018  8:00 AM CST   Lab with  LAB   [facility] ([facility])    [address] [place] [address]  [address]st [facility] [address] [phone]            Nov 14, 2018  9:00 AM CST   (Arrive by 8:45 AM)   Return General Liver with Soledad Skelton PA-C   [facility]atology ([facility])    [address] [place] [address]  [address]  [facility] [address]   [phone]            Dec 04, 2018  2:00 PM CST   (Arrive by 1:45 PM)   Return Visit with Pauline Clancy MD   [facility] ([facility])    [address] [place] [address]  [address]  [facility] [address]   [phone]            Jan 02, 2019  6:45 AM CST   Lab with  LAB   [facility] ([facility])    [address] [place] [address]  [address]st [facility] [address] [phone]            Jan 02, 2019  7:00 AM CST   MR ABDOMEN W/O & W CONTRAST with [address] [facility] ([facility])    [address] [place] [address]  [address]st [facility] [address] [phone]           How do I prepare for my exam? (Food and drink instructions) Do not eat or drink for 6 hours prior to exam.  **If you will be receiving sedation or general anesthesia, please see special notes below.**  How do I prepare for my exam? (Other instructions) Take your medicines as usual, unless your doctor tells you not to. You may or may not receive IV contrast for this exam pending the discretion of the Radiologist.  **If you will be receiving sedation or general anesthesia, please see special notes below.**  What should I wear: The MRI machine uses a strong magnet. Please wear clothes without metal (snaps, zippers). A sweatsuit works well, or we may give you a hospital gown. Please remove any body piercings and hair extensions before you arrive. You will also remove watches, jewelry, hairpins, wallets, dentures, partial dental plates and hearing aids. You may wear contact lenses, and you may be able to wear your rings. We have a safe place to keep your personal items, but it is safer to leave them at home.  How long does the exam take: Most tests take 30 to 60 minutes.  HOWEVER, IF YOUR DOCTOR PRESCRIBES ANESTHESIA please plan on spending four to five hours in the recovery room.  What should I bring: Bring a list of your current medicines to your exam (including vitamins, minerals and over-the-counter drugs). Also bring the results of similar scans you may have had.  Do I need a : **If you will be receiving sedation or general anesthesia, please see special notes below.**  What should I do after the exam: No Restrictions, You may resume normal activities.  What is this test: MRI (magnetic resonance imaging) uses a strong magnet and radio waves to look inside the body. An MRA (magnetic resonance angiogram) does the same thing, but it lets us look at your blood vessels. A computer turns the radio waves into pictures showing cross sections of the body, much like slices of bread. This helps us see any problems more clearly. You may receive fluid (called  contrast ) before or during your scan. The fluid helps us see the  "pictures better. We give the fluid through an IV (small needle in your arm).  Who should I call with questions: If you have any questions, please contact your Imaging Department exam site. Directions, parking instructions, and other information is available on our website, [email or web address].            Ziyad 10, 2019  4:00 PM CST   (Arrive by 3:45 PM)   Return Visit with Hi Melgar MD   [facility] ([facility] [place])    [address]  [address]  [facility] [address] [phone]            Mar 01, 2019 10:30 AM CST   (Arrive by 10:15 AM)   Return Visit with Khloe Torres MD   [facility] ([facility])    [address]  4th Floor  [facility] [address] [phone]              Who to contact     If you have questions or need follow up information about today's clinic visit or your schedule please contact [facility] directly at [phone].  Normal or non-critical lab and imaging results will be communicated to you by MyChart, letter or phone within 4 business days after the clinic has received the results. If you do not hear from us within 7 days, please contact the clinic through MyChart or phone. If you have a critical or abnormal lab result, we will notify you by phone as soon as possible.  Submit refill requests through [facility] or call your pharmacy and they will forward the refill request to us. Please allow 3 business days for your refill to be completed.          Additional Information About Your Visit        Care EveryWhere ID     This is your Care EveryWhere ID. This could be used by other organizations to access your [facility] medical records  [id number]        Your Vitals Were     Pulse Temperature Height Pulse Oximetry BMI (Body Mass Index)       52 97.6  F (36.4  C) 1.702 m (5' 7\") 96% 28.94 kg/m2        Blood Pressure from Last 3 " Encounters:   11/05/18 143/72   10/26/18 148/78   10/24/18 134/73    Weight from Last 3 Encounters:   11/05/18 83.8 kg (184 lb 12.8 oz)   10/24/18 82.6 kg (182 lb 3.2 oz)   10/12/18 83.5 kg (184 lb)              Today, you had the following     No orders found for display       Primary Care Provider Office Phone # Fax #    Jaswinder Anne [phone]       [address] [address]        Equal Access to Services     [facility]: Hadii kostas ku hadasho Soomaali, waaxda luqadaha, qaybta kaalmada adeshamayada, javier rico . So [facility] [phone].    ATENCIÓN: Si habla español, tiene a samaniego disposición servicios gratuitos de asistencia lingüística. [facility] [phone].    We comply with applicable federal civil rights laws and [place] laws. We do not discriminate on the basis of race, color, national origin, age, disability, sex, sexual orientation, or gender identity.            Thank you!     Thank you for choosing [facility]  for your care. Our goal is always to provide you with excellent care. Hearing back from our patients is one way we can continue to improve our services. Please take a few minutes to complete the written survey that you may receive in the mail after your visit with us. Thank you!             Your Updated Medication List - Protect others around you: Learn how to safely use, store and throw away your medicines at www.disposemymeds.org.          This list is accurate as of 11/5/18 11:19 AM.  Always use your most recent med list.                   Brand Name Dispense Instructions for use Diagnosis    alfuzosin 10 MG 24 hr tablet    UROXATRAL     Take 10 mg by mouth daily    Alcoholic cirrhosis of liver without ascites (H), HCC (hepatocellular carcinoma) (H)       furosemide 20 MG tablet    LASIX    60 tablet    Take 1 tablet (20 mg) by mouth daily    Alcoholic cirrhosis of liver without ascites (H), HCC  (hepatocellular carcinoma) (H), Edema, unspecified type       NADOLOL PO      Take 20 mg by mouth daily        omeprazole 20 MG CR capsule    priLOSEC     Take 20 mg by mouth        rifampin 300 MG capsule    RIFADIN    60 capsule    Take 2 capsules (600 mg) by mouth daily    Latent tuberculosis infection       spironolactone 25 MG tablet    ALDACTONE    60 tablet    Take 2 tablets (50 mg) by mouth daily    Alcoholic cirrhosis of liver without ascites (H), HCC (hepatocellular carcinoma) (H), Edema, unspecified type       * TRAMADOL HCL PO      Take 50 mg by mouth as needed for moderate to severe pain        * traMADol 50 MG tablet    ULTRAM    20 tablet    Take one po bid prn severe pain    Right inguinal hernia       XIFAXAN 550 MG Tabs tablet   Generic drug:  rifaximin           * Notice:  This list has 2 medication(s) that are the same as other medications prescribed for you. Read the directions carefully, and ask your doctor or other care provider to review them with you.

## 2018-11-06 DIAGNOSIS — K74.60 CIRRHOSIS OF LIVER WITHOUT ASCITES, UNSPECIFIED HEPATIC CIRRHOSIS TYPE (H): Primary | ICD-10-CM

## 2018-11-07 ENCOUNTER — OFFICE VISIT (OUTPATIENT)
Dept: FAMILY MEDICINE | Facility: CLINIC | Age: 63
End: 2018-11-07
Payer: MEDICARE

## 2018-11-07 ENCOUNTER — OFFICE VISIT (OUTPATIENT)
Dept: GASTROENTEROLOGY | Facility: CLINIC | Age: 63
End: 2018-11-07
Attending: INTERNAL MEDICINE
Payer: MEDICARE

## 2018-11-07 VITALS
SYSTOLIC BLOOD PRESSURE: 151 MMHG | WEIGHT: 180 LBS | BODY MASS INDEX: 28.25 KG/M2 | DIASTOLIC BLOOD PRESSURE: 73 MMHG | RESPIRATION RATE: 18 BRPM | HEART RATE: 60 BPM | HEIGHT: 67 IN

## 2018-11-07 VITALS
TEMPERATURE: 98 F | DIASTOLIC BLOOD PRESSURE: 68 MMHG | HEART RATE: 51 BPM | OXYGEN SATURATION: 99 % | BODY MASS INDEX: 28.32 KG/M2 | WEIGHT: 180.8 LBS | SYSTOLIC BLOOD PRESSURE: 155 MMHG

## 2018-11-07 DIAGNOSIS — K74.60 CIRRHOSIS OF LIVER WITHOUT ASCITES, UNSPECIFIED HEPATIC CIRRHOSIS TYPE (H): ICD-10-CM

## 2018-11-07 DIAGNOSIS — R10.11 ABDOMINAL PAIN, RIGHT UPPER QUADRANT: ICD-10-CM

## 2018-11-07 DIAGNOSIS — K74.60 CIRRHOSIS OF LIVER (H): ICD-10-CM

## 2018-11-07 DIAGNOSIS — R10.11 ABDOMINAL PAIN, RIGHT UPPER QUADRANT: Primary | ICD-10-CM

## 2018-11-07 DIAGNOSIS — Z79.899 OTHER LONG TERM (CURRENT) DRUG THERAPY: ICD-10-CM

## 2018-11-07 DIAGNOSIS — C22.0 HCC (HEPATOCELLULAR CARCINOMA) (H): Primary | ICD-10-CM

## 2018-11-07 LAB
ALBUMIN SERPL-MCNC: 2.6 G/DL (ref 3.4–5)
ALBUMIN UR-MCNC: NEGATIVE MG/DL
ALP SERPL-CCNC: 260 U/L (ref 40–150)
ALT SERPL W P-5'-P-CCNC: 38 U/L (ref 0–70)
ANION GAP SERPL CALCULATED.3IONS-SCNC: 8 MMOL/L (ref 3–14)
APPEARANCE UR: CLEAR
AST SERPL W P-5'-P-CCNC: 49 U/L (ref 0–45)
BILIRUB DIRECT SERPL-MCNC: 1.2 MG/DL (ref 0–0.2)
BILIRUB SERPL-MCNC: 2.1 MG/DL (ref 0.2–1.3)
BILIRUB UR QL STRIP: NEGATIVE
BUN SERPL-MCNC: 9 MG/DL (ref 7–30)
CALCIUM SERPL-MCNC: 8.2 MG/DL (ref 8.5–10.1)
CHLORIDE SERPL-SCNC: 110 MMOL/L (ref 94–109)
CO2 SERPL-SCNC: 24 MMOL/L (ref 20–32)
COLOR UR AUTO: YELLOW
CREAT SERPL-MCNC: 0.53 MG/DL (ref 0.66–1.25)
ERYTHROCYTE [DISTWIDTH] IN BLOOD BY AUTOMATED COUNT: 13.2 % (ref 10–15)
GFR SERPL CREATININE-BSD FRML MDRD: >90 ML/MIN/1.7M2
GLUCOSE SERPL-MCNC: 92 MG/DL (ref 70–99)
GLUCOSE UR STRIP-MCNC: NEGATIVE MG/DL
HCT VFR BLD AUTO: 38.3 % (ref 40–53)
HGB BLD-MCNC: 12.5 G/DL (ref 13.3–17.7)
HGB UR QL STRIP: NEGATIVE
INR PPP: 1.61 (ref 0.86–1.14)
KETONES UR STRIP-MCNC: NEGATIVE MG/DL
LEUKOCYTE ESTERASE UR QL STRIP: NEGATIVE
MCH RBC QN AUTO: 32 PG (ref 26.5–33)
MCHC RBC AUTO-ENTMCNC: 32.6 G/DL (ref 31.5–36.5)
MCV RBC AUTO: 98 FL (ref 78–100)
MUCOUS THREADS #/AREA URNS LPF: PRESENT /LPF
NITRATE UR QL: NEGATIVE
PH UR STRIP: 7 PH (ref 5–7)
PLATELET # BLD AUTO: 79 10E9/L (ref 150–450)
POTASSIUM SERPL-SCNC: 4.2 MMOL/L (ref 3.4–5.3)
PROT SERPL-MCNC: 7.2 G/DL (ref 6.8–8.8)
RBC # BLD AUTO: 3.91 10E12/L (ref 4.4–5.9)
RBC #/AREA URNS AUTO: 1 /HPF (ref 0–2)
SODIUM SERPL-SCNC: 142 MMOL/L (ref 133–144)
SOURCE: ABNORMAL
SP GR UR STRIP: 1.01 (ref 1–1.03)
UROBILINOGEN UR STRIP-MCNC: 0 MG/DL (ref 0–2)
WBC # BLD AUTO: 2.6 10E9/L (ref 4–11)
WBC #/AREA URNS AUTO: 1 /HPF (ref 0–5)

## 2018-11-07 PROCEDURE — G0463 HOSPITAL OUTPT CLINIC VISIT: HCPCS | Mod: ZF

## 2018-11-07 PROCEDURE — 80321 ALCOHOLS BIOMARKERS 1OR 2: CPT | Performed by: INTERNAL MEDICINE

## 2018-11-07 PROCEDURE — 85027 COMPLETE CBC AUTOMATED: CPT | Performed by: INTERNAL MEDICINE

## 2018-11-07 PROCEDURE — 80048 BASIC METABOLIC PNL TOTAL CA: CPT | Performed by: INTERNAL MEDICINE

## 2018-11-07 PROCEDURE — 80076 HEPATIC FUNCTION PANEL: CPT | Performed by: INTERNAL MEDICINE

## 2018-11-07 PROCEDURE — 36415 COLL VENOUS BLD VENIPUNCTURE: CPT | Performed by: INTERNAL MEDICINE

## 2018-11-07 PROCEDURE — 85610 PROTHROMBIN TIME: CPT | Performed by: INTERNAL MEDICINE

## 2018-11-07 PROCEDURE — 81001 URINALYSIS AUTO W/SCOPE: CPT | Mod: XU | Performed by: INTERNAL MEDICINE

## 2018-11-07 ASSESSMENT — PAIN SCALES - GENERAL: PAINLEVEL: NO PAIN (0)

## 2018-11-07 NOTE — PROGRESS NOTES
[facility] [place]   Jaswinder Anne MD  11/07/2018      Chief Complaint:   Results and Letter for School/Work    History of Present Illness:   Loy Limon is a 63 year old male with a history of hepatocellular carcinoma, inguinal hernia, and cirrhosis of the liver who presents for evaluation of follow up. The patient presents with an interpretor.     The patient was last seen on 10/24 for right upper quadrant abdominal pain, which could have been due to his gall bladder. Several labs were ordered to evaluate this, as well as an ultrasound (see results below). He followed up with GI for his pain caused by a right inguinal hernia, and received a mesh repair.     Today, the patient states that he still has hernia pain, and that he was unable to reduce it, causing him to go to the emergency department on 10/26. The patient says he has an appointment scheduled with a hepatologist. The patient continues to have right upper quadrant abdominal pain toward the front. This does not make him vomit, but he notes he has vomited due to his hernia pain in the past. The patient states that his pain is aggravated by fatty and spicy foods. He has reduced his consumption of spicy food and notes that it helped improve his pain somewhat. He believes this pain began in early October and is a new pain to him. Overall, the patient feels that his pain has improved since he was last seen. He has no bowel issues, but states that he has urinary urgency and frequency when he has leg swelling. He states that an unpeeled apple would aggravate his pain but not a peeled one. The patient eats a lot of cheese and drinks milk, but neither worsens his abdominal pain.     US IMPRESSION:   1. Cirrhotic hepatic morphology with a rounded echogenic region in the  right hepatic lobe corresponding to the treated hepatocellular  carcinoma, better seen on comparison MRI. No new focal hepatic mass.  a. LI-RADS US Category: US-1 Negative: No  US evidence of HCC  b. Recommend continued surveillance US.  2. Retrograde flow in the splenic vein, as seen previously, compatible  with portal hypertension.  3. No findings to explain the patient's abdominal pain.    Recent labs   Component      Latest Ref Rng & Units 10/24/2018   WBC      4.0 - 11.0 10e9/L 3.2 (L)   RBC Count      4.4 - 5.9 10e12/L 3.64 (L)   Hemoglobin      13.3 - 17.7 g/dL 12.0 (L)   Hematocrit      40.0 - 53.0 % 36.2 (L)   MCV      78 - 100 fl 100   MCH      26.5 - 33.0 pg 33.0   MCHC      31.5 - 36.5 g/dL 33.1   RDW      10.0 - 15.0 % 13.5   Platelet Count      150 - 450 10e9/L 78 (L)   Diff Method       Automated Method   % Neutrophils      % 48.1   % Lymphocytes      % 38.1   % Monocytes      % 8.5   % Eosinophils      % 4.4   % Basophils      % 0.6   % Immature Granulocytes      % 0.3   Nucleated RBCs      0 /100 0   Absolute Neutrophil      1.6 - 8.3 10e9/L 1.5 (L)   Absolute Lymphocytes      0.8 - 5.3 10e9/L 1.2   Absolute Monocytes      0.0 - 1.3 10e9/L 0.3   Absolute Eosinophils      0.0 - 0.7 10e9/L 0.1   Absolute Basophils      0.0 - 0.2 10e9/L 0.0   Abs Immature Granulocytes      0 - 0.4 10e9/L 0.0   Absolute Nucleated RBC       0.0   Sodium      133 - 144 mmol/L 138   Potassium      3.4 - 5.3 mmol/L 4.3   Chloride      94 - 109 mmol/L 109   Carbon Dioxide      20 - 32 mmol/L 24   Anion Gap      3 - 14 mmol/L 5   Glucose      70 - 99 mg/dL 96   Urea Nitrogen      7 - 30 mg/dL 9   Creatinine      0.66 - 1.25 mg/dL 0.58 (L)   GFR Estimate      >60 mL/min/1.7m2 >90   GFR Estimate If Black      >60 mL/min/1.7m2 >90   Calcium      8.5 - 10.1 mg/dL 8.3 (L)   Bilirubin Total      0.2 - 1.3 mg/dL 2.5 (H)   Albumin      3.4 - 5.0 g/dL 2.5 (L)   Protein Total      6.8 - 8.8 g/dL 6.9   Alkaline Phosphatase      40 - 150 U/L 255 (H)   ALT      0 - 70 U/L 40   AST      0 - 45 U/L 48 (H)   Color Urine       Yellow   Appearance Urine       Clear   Glucose Urine      NEG:Negative mg/dL Negative    Bilirubin Urine      NEG:Negative Negative   Ketones Urine      NEG:Negative mg/dL Negative   Specific Gravity Urine      1.003 - 1.035 1.012   Blood Urine      NEG:Negative Small (A)   pH Urine      5.0 - 7.0 pH 5.0   Protein Albumin Urine      NEG:Negative mg/dL Negative   Urobilinogen mg/dL      0.0 - 2.0 mg/dL 0.0   Nitrite Urine      NEG:Negative Negative   Leukocyte Esterase Urine      NEG:Negative Negative   Source       Midstream Urine   WBC Urine      0 - 5 /HPF 1   RBC Urine      0 - 2 /HPF 2   Squamous Epithelial /HPF Urine      0 - 1 /HPF <1   Mucous Urine      NEG:Negative /LPF Present (A)   Lipase      73 - 393 U/L 55 (L)      Review of Systems:   Pertinent items are noted in HPI, remainder of complete ROS is negative.      Active Medications:      NADOLOL PO, Take 20 mg by mouth daily, Disp: , Rfl:      omeprazole (PRILOSEC) 20 MG CR capsule, Take 20 mg by mouth, Disp: , Rfl:      rifampin (RIFADIN) 300 MG capsule, Take 2 capsules (600 mg) by mouth daily, Disp: 60 capsule, Rfl: 3     spironolactone (ALDACTONE) 25 MG tablet, Take 2 tablets (50 mg) by mouth daily, Disp: 60 tablet, Rfl: 3     traMADol (ULTRAM) 50 MG tablet, Take one po bid prn severe pain, Disp: 20 tablet, Rfl: 0     TRAMADOL HCL PO, Take 50 mg by mouth as needed for moderate to severe pain, Disp: , Rfl:      XIFAXAN 550 MG TABS tablet, , Disp: , Rfl:      alfuzosin (UROXATRAL) 10 MG 24 hr tablet, Take 10 mg by mouth daily, Disp: , Rfl:      furosemide (LASIX) 20 MG tablet, Take 1 tablet (20 mg) by mouth daily (Patient not taking: Reported on 11/7/2018), Disp: 60 tablet, Rfl: 3      Allergies:   Review of patient's allergies indicates no known allergies.      Past Medical History:  Hepatocellular carcinoma   Cirrhosis of liver   Enlarged prostate  Inguinal hernia   Liver lesion     Past Surgical History:  Appendectomy   colonoscopy    Family History:   Liver disease - brother       Social History:   The patient was accompanied to the  appointment by: an .  Smoking Status: former smoker (cigarettes, cigars, 0.03 PPD/20 years)   Smokeless Tobacco: never   Alcohol Use: no (quit 02/2018)   Marital Status:         Physical Exam:   /68 (BP Location: Right arm, Patient Position: Sitting, Cuff Size: Adult Regular)  Pulse 51  Temp 98  F (36.7  C) (Oral)  Wt 82 kg (180 lb 12.8 oz)  SpO2 99%  BMI 28.32 kg/m2     Constitutional: Alert. In no distress.  Head: The scalp, face, and head appear normal.  Musculoskeletal: Extremities appear normal. No gross deformities noted.   Neurologic: Gait normal. Speech is normal and fluent.  Psychiatric: Mentation appears normal. Normal affect.     Assessment and Plan:  Hernia   He is working with surgery to schedule and will make a pre-op appointment here once he knows a surgery date.     Abdominal pain, right upper quadrant  Right upper quadrant pain if avoiding spicy food. He also said it is better if he does not eat fatty food, but it turns out he eats lots of cheese and it does not affect him. Asked to discuss with GI next week. I sent an epic message o GI as a heads up. Last visit UA had a very small amount of blood, we will check that today. If persistent we will pursue this. No pain now per pt.  - UA with Micro reflex to Culture     Recent labs  Labs from my last visit with him, as well as emergency department and surgery visits.    1/2 hr visit over half couns/coord care majority review RUQ pain via , eval/treat options    Follow-up: Preop      Scribe Disclosure:   I, Chayo Urbina, am serving as a scribe to document services personally performed by Jaswinder Anne MD at this visit, based upon the provider's statements to me. All documentation has been reviewed by the aforementioned provider prior to being entered into the official medical record.     Portions of this medical record were completed by a scribe. UPON MY REVIEW AND AUTHENTICATION BY ELECTRONIC SIGNATURE, this  confirms (a) I performed the applicable clinical services, and (b) the record is accurate.   Jaswinder Anne MD

## 2018-11-07 NOTE — NURSING NOTE
"Chief Complaint   Patient presents with     RECHECK     Cirrhosis, HCC       /73 (BP Location: Right arm, Patient Position: Sitting, Cuff Size: Adult Regular)  Pulse 60  Resp 18  Ht 1.702 m (5' 7\")  Wt 81.6 kg (180 lb)  BMI 28.19 kg/m2    Joyce Ruvalcaba [facility]  11/7/2018 10:50 AM      "

## 2018-11-07 NOTE — PATIENT INSTRUCTIONS
[facility] Medication Refill Request Information:  * Please contact your pharmacy regarding ANY request for medication refills.  ** [facility] Prescription Fax = [phone]  * Please allow 3 business days for routine medication refills.  * Please allow 5 business days for controlled substance medication refills.     [facility] Test Result notification information:  *You will be notified with in 7-10 days of your appointment day regarding the results of your test.  If you are on MyChart you will be notified as soon as the provider has reviewed the results and signed off on them.    [facility]: [phone]     Schedule pre-op appointment before hernia surgery with Dr. Anne

## 2018-11-07 NOTE — MR AVS SNAPSHOT
After Visit Summary   11/7/2018    Loy Limon    MRN: [id number]           Patient Information     Date Of Birth          1955        Visit Information        Provider Department      11/7/2018 8:35 AM Asher Smith Oca, Joseph R, MD [facility]        Today's Diagnoses     Abdominal pain, right upper quadrant    -  1      Care Instructions    Primary Care Center Medication Refill Request Information:  * Please contact your pharmacy regarding ANY request for medication refills.  ** [facility] Prescription Fax = [phone]  * Please allow 3 business days for routine medication refills.  * Please allow 5 business days for controlled substance medication refills.     Primary Care Center Test Result notification information:  *You will be notified with in 7-10 days of your appointment day regarding the results of your test.  If you are on MyChart you will be notified as soon as the provider has reviewed the results and signed off on them.    Primary [facility]: [phone]     Schedule pre-op appointment before hernia surgery with Dr. Anne          Follow-ups after your visit        Your next 10 appointments already scheduled     Nov 14, 2018  8:00 AM CST   Lab with UC LAB   [facility] ([facility])    [address] [place] [address]  1st Floor  [facility] [address] [phone]            Nov 14, 2018  9:00 AM CST   (Arrive by 8:45 AM)   Return General Liver with Soledad Skelton PA-C   [facility] ([facility])    [address] [place] [address] [address]  [address]  [facility] [id number] [phone]            Dec 04, 2018  2:00 PM CST   (Arrive by 1:45 PM)   Return Visit with Pauline Clancy MD   [facility] ([facility])    [address] [place] [address] Se  [address]  [facility] [id number] [phone]            Jan 02, 2019  6:45 AM CST   Lab with UC LAB    [facility] ([facility])    [address] [address]   [phone]            Jan 02, 2019  7:00 AM CST   MR ABDOMEN W/O & W CONTRAST with UCMR1   [facility] ([facility])    [address] [address]   [phone]           How do I prepare for my exam? (Food and drink instructions) Do not eat or drink for 6 hours prior to exam. **If you will be receiving sedation or general anesthesia, please see special notes below.**  How do I prepare for my exam? (Other instructions) Take your medicines as usual, unless your doctor tells you not to. You may or may not receive IV contrast for this exam pending the discretion of the Radiologist.  **If you will be receiving sedation or general anesthesia, please see special notes below.**  What should I wear: The MRI machine uses a strong magnet. Please wear clothes without metal (snaps, zippers). A sweatsuit works well, or we may give you a hospital gown. Please remove any body piercings and hair extensions before you arrive. You will also remove watches, jewelry, hairpins, wallets, dentures, partial dental plates and hearing aids. You may wear contact lenses, and you may be able to wear your rings. We have a safe place to keep your personal items, but it is safer to leave them at home.  How long does the exam take: Most tests take 30 to 60 minutes.  HOWEVER, IF YOUR DOCTOR PRESCRIBES ANESTHESIA please plan on spending four to five hours in the recovery room.  What should I bring: Bring a list of your current medicines to your exam (including vitamins, minerals and over-the-counter drugs). Also bring the results of similar scans you may have had.  Do I need a : **If you will be receiving sedation or general anesthesia, please see special notes below.**  What should I do after the exam: No Restrictions, You may resume normal activities.   What is this test: MRI (magnetic resonance imaging) uses a strong magnet and radio waves to look inside the body. An MRA (magnetic resonance angiogram) does the same thing, but it lets us look at your blood vessels. A computer turns the radio waves into pictures showing cross sections of the body, much like slices of bread. This helps us see any problems more clearly. You may receive fluid (called  contrast ) before or during your scan. The fluid helps us see the pictures better. We give the fluid through an IV (small needle in your arm).  Who should I call with questions: If you have any questions, please contact your Imaging Department exam site. Directions, parking instructions, and other information is available on our website, [email or web address].            Ziyad 10, 2019  4:00 PM CST   (Arrive by 3:45 PM)   Return Visit with Hi Melgar MD   [facility] ([facility])    [address]  [address]  [facility] [id number] [phone]            Mar 01, 2019 10:30 AM CST   (Arrive by 10:15 AM)   Return Visit with Khloe Torres MD   [facility] ([facility])    [address]  4th Floor  [facility] [id number] [phone]              Future tests that were ordered for you today     Open Future Orders        Priority Expected Expires Ordered    UA with Micro reflex to Culture Routine 11/7/2018 11/7/2019 11/7/2018    Hepatic panel Routine 11/6/2018 2/5/2019 11/6/2018    CBC with platelets Routine  12/6/2018 11/6/2018    Basic metabolic panel Routine  12/6/2018 11/6/2018    INR Routine  12/6/2018 11/6/2018            Who to contact     Please call your clinic at [phone] to:    Ask questions about your health    Make or cancel appointments    Discuss your medicines    Learn about your test results    Speak to your doctor            Additional Information About  Your Visit        Care EveryWhere ID     This is your Care EveryWhere ID. This could be used by other organizations to access your [facility] medical records  [id number]        Your Vitals Were     Pulse Temperature Pulse Oximetry BMI (Body Mass Index)          51 98  F (36.7  C) (Oral) 99% 28.32 kg/m2         Blood Pressure from Last 3 Encounters:   11/07/18 155/68   11/05/18 143/72   10/26/18 148/78    Weight from Last 3 Encounters:   11/07/18 82 kg (180 lb 12.8 oz)   11/05/18 83.8 kg (184 lb 12.8 oz)   10/24/18 82.6 kg (182 lb 3.2 oz)               Primary Care Provider Office Phone # Fax #    Jaswinder Anne [phone]       [address] [address]        Equal Access to Services     [facility] : Hadii aad ku hadasho Soomaali, waaxda luqadaha, qaybta kaalmada adeegyada, javier longoriain haygraysonn janell rico . So [facility] [phone].    ATENCIÓN: Si habla español, tiene a samaniego disposición servicios gratuitos de asistencia lingüística. Earle al [phone].    We comply with applicable federal civil rights laws and [place] laws. We do not discriminate on the basis of race, color, national origin, age, disability, sex, sexual orientation, or gender identity.            Thank you!     Thank you for choosing [facility]  for your care. Our goal is always to provide you with excellent care. Hearing back from our patients is one way we can continue to improve our services. Please take a few minutes to complete the written survey that you may receive in the mail after your visit with us. Thank you!             Your Updated Medication List - Protect others around you: Learn how to safely use, store and throw away your medicines at www.disposemymeds.org.          This list is accurate as of 11/7/18  9:29 AM.  Always use your most recent med list.                   Brand Name Dispense Instructions for use Diagnosis    alfuzosin 10 MG 24 hr tablet    UROXATRAL      Take 10 mg by mouth daily    Alcoholic cirrhosis of liver without ascites (H), HCC (hepatocellular carcinoma) (H)       furosemide 20 MG tablet    LASIX    60 tablet    Take 1 tablet (20 mg) by mouth daily    Alcoholic cirrhosis of liver without ascites (H), HCC (hepatocellular carcinoma) (H), Edema, unspecified type       NADOLOL PO      Take 20 mg by mouth daily        omeprazole 20 MG CR capsule    priLOSEC     Take 20 mg by mouth        rifampin 300 MG capsule    RIFADIN    60 capsule    Take 2 capsules (600 mg) by mouth daily    Latent tuberculosis infection       spironolactone 25 MG tablet    ALDACTONE    60 tablet    Take 2 tablets (50 mg) by mouth daily    Alcoholic cirrhosis of liver without ascites (H), HCC (hepatocellular carcinoma) (H), Edema, unspecified type       * TRAMADOL HCL PO      Take 50 mg by mouth as needed for moderate to severe pain        * traMADol 50 MG tablet    ULTRAM    20 tablet    Take one po bid prn severe pain    Right inguinal hernia       XIFAXAN 550 MG Tabs tablet   Generic drug:  rifaximin           * Notice:  This list has 2 medication(s) that are the same as other medications prescribed for you. Read the directions carefully, and ask your doctor or other care provider to review them with you.

## 2018-11-07 NOTE — PROGRESS NOTES
[facility]    Hepatology follow-up    CHIEF COMPLAINT AND REASON FOR VISIT:  Hepatocellular carcinoma, on the transplantation waiting list.      HISTORY OF PRESENT ILLNESS:  Mr. Limon is a 63-year-old [place] immigrant with history significant for alcohol abuse.  He did get diagnosed in 03/2018 with right-sided abdominal pain which then was followed with an imaging, and this showed that he had cirrhosis and also portal hypertension.  There were also lesions concerning for hepatocellular carcinoma and he was sent to be evaluated here for that and considered for liver transplantation.  He did stay away from alcohol from 02/2018 and has currently fulfilled the 6-month sobriety and the recommendations by our .  Regarding his HCC, he did have by Dr. Debora Del Rosario on 06/13/2018 a CT-guided microwave ablation and after that no viable tumor was identified.  Mr. Limon also complains of right inguinal discomfort which led him to visit the emergency room and because of that, he was seen by Dr. Echevarria and he has an inguinal hernia which he plans to operate.  Otherwise, his weight has remained stable, might even gone up.  Appetite is good.  No edema, no jaundice.  He does not have any pruritus.  He has some easy bruising.  Denies also other issues including confusion and memory issues.  He does not have also any GI bleed since he was last seen here.  He is moving his bowels like twice a day with no blood in it.        Medical hx Surgical hx   Past Medical History:   Diagnosis Date     Cancer (H)      Cirrhosis of liver (H) 5/8/2018     Enlarged prostate      Inguinal hernia      Liver lesion 5/8/2018      Past Surgical History:   Procedure Laterality Date     APPENDECTOMY       COLONOSCOPY      2018          Medications  Prior to Admission medications    Medication Sig Start Date End Date Taking? Authorizing Provider   alfuzosin (UROXATRAL) 10 MG 24 hr tablet Take 10 mg by mouth  "daily   Yes Reported, Patient   NADOLOL PO Take 20 mg by mouth daily   Yes Reported, Patient   omeprazole (PRILOSEC) 20 MG CR capsule Take 20 mg by mouth 4/4/18 4/4/19 Yes Reported, Patient   rifampin (RIFADIN) 300 MG capsule Take 2 capsules (600 mg) by mouth daily 8/21/18  Yes Pauline Clancy MD   spironolactone (ALDACTONE) 25 MG tablet Take 2 tablets (50 mg) by mouth daily 8/14/18  Yes Tamela Carballo MD   traMADol (ULTRAM) 50 MG tablet Take one po bid prn severe pain 10/24/18  Yes Jaswinder Anne MD   XIFAXAN 550 MG TABS tablet Take 550 mg by mouth 2 times daily  6/28/18  Yes Reported, Patient   furosemide (LASIX) 20 MG tablet Take 1 tablet (20 mg) by mouth daily  Patient not taking: Reported on 11/7/2018 8/14/18   Tamela Carballo MD       Allergies  No Known Allergies    Review of systems  A 10-point review of systems was negative    Examination  /73 (BP Location: Right arm, Patient Position: Sitting, Cuff Size: Adult Regular)  Pulse 60  Resp 18  Ht 1.702 m (5' 7\")  Wt 81.6 kg (180 lb)  BMI 28.19 kg/m2  Body mass index is 28.19 kg/(m^2).    Gen- well, NAD, A+Ox3, normal color  Lym- no palpable LAD  CVS- RRR  RS- CTA  Abd- Not distended. No shifting dullness identified. Reduceable right inguinal hernia.  Extr- hands normal, no OSKAR  Skin- no rash or jaundice  Neuro- no asterixis  Psych- normal mood    Laboratory  Lab Results   Component Value Date     11/07/2018    POTASSIUM 4.2 11/07/2018    CHLORIDE 110 11/07/2018    CO2 24 11/07/2018    BUN 9 11/07/2018    CR 0.53 11/07/2018       Lab Results   Component Value Date    BILITOTAL 2.1 11/07/2018    ALT 38 11/07/2018    AST 49 11/07/2018    ALKPHOS 260 11/07/2018       Lab Results   Component Value Date    ALBUMIN 2.6 11/07/2018    PROTTOTAL 7.2 11/07/2018        Lab Results   Component Value Date    WBC 2.6 11/07/2018    HGB 12.5 11/07/2018    MCV 98 11/07/2018    PLT 79 11/07/2018       Lab Results   Component " Value Date    INR 1.61 11/07/2018     MELD-Na score: 14 at 11/7/2018 10:10 AM  MELD score: 14 at 11/7/2018 10:10 AM  Calculated from:  Serum Creatinine: 0.53 mg/dL (Rounded to 1) at 11/7/2018 10:10 AM  Serum Sodium: 142 mmol/L (Rounded to 137) at 11/7/2018 10:10 AM  Total Bilirubin: 2.1 mg/dL at 11/7/2018 10:10 AM  INR(ratio): 1.61 at 11/7/2018 10:10 AM  Age: 63 years    Radiology    ASSESSMENT AND PLAN:  Mr. Limon has alcoholic liver disease complicated by hepatocellular carcinoma.  He did have that lesion treated with CT-guided radiofrequency ablation and prior to that he had TACE.  The lesion was 2.5.  There is currently no active disease, although he had some indeterminate lesions which will be followed.  His other issue is some abdominal pain.  He was seen by Dr. Echevarria who thought that he has a right inguinal hernia.  This was present for quite a number of years.  He will have surgery with Dr. Echevarria as his native MELD score is very low at 14 and we suspect that he can tolerate that.  We will continue doing surveillance for HCC.  He will need an upper endoscopy also for esophageal varices, and he will follow for his other healthcare maintenance issues with his primary care physician.  Please note that there is a delay in granting him exception points.  This is 6 months and the rationale is to weed out patients who have progressive disease who can metastasize or fall off the list.      This was a 25-minute visit of which more than 50% was spent in explaining to the patient what our plan of care was.  We answered all his questions.  He will have his health care maintenance issues addressed by his primary physician.       Tamela Carballo MD  Hepatology  [facility]

## 2018-11-07 NOTE — LETTER
11/7/2018      RE: Loy Limon  [address]  [place] [facility] [place] [address]       [facility]    Hepatology follow-up    CHIEF COMPLAINT AND REASON FOR VISIT:  Hepatocellular carcinoma, on the transplantation waiting list.      HISTORY OF PRESENT ILLNESS:  Mr. Limon is a 63-year-old [place] immigrant with history significant for alcohol abuse.  He did get diagnosed in 03/2018 with right-sided abdominal pain which then was followed with an imaging, and this showed that he had cirrhosis and also portal hypertension.  There were also lesions concerning for hepatocellular carcinoma and he was sent to be evaluated here for that and considered for liver transplantation.  He did stay away from alcohol from 02/2018 and has currently fulfilled the 6-month sobriety and the recommendations by our .  Regarding his HCC, he did have by Dr. Debora Del Rosario on 06/13/2018 a CT-guided microwave ablation and after that no viable tumor was identified.  Mr. Limon also complains of right inguinal discomfort which led him to visit the emergency room and because of that, he was seen by Dr. Echevarria and he has an inguinal hernia which he plans to operate.  Otherwise, his weight has remained stable, might even gone up.  Appetite is good.  No edema, no jaundice.  He does not have any pruritus.  He has some easy bruising.  Denies also other issues including confusion and memory issues.  He does not have also any GI bleed since he was last seen here.  He is moving his bowels like twice a day with no blood in it.        Medical hx Surgical hx   Past Medical History:   Diagnosis Date     Cancer (H)      Cirrhosis of liver (H) 5/8/2018     Enlarged prostate      Inguinal hernia      Liver lesion 5/8/2018      Past Surgical History:   Procedure Laterality Date     APPENDECTOMY       COLONOSCOPY      2018          Medications  Prior to Admission medications    Medication Sig Start Date End Date Taking?  "Authorizing Provider   alfuzosin (UROXATRAL) 10 MG 24 hr tablet Take 10 mg by mouth daily   Yes Reported, Patient   NADOLOL PO Take 20 mg by mouth daily   Yes Reported, Patient   omeprazole (PRILOSEC) 20 MG CR capsule Take 20 mg by mouth 4/4/18 4/4/19 Yes Reported, Patient   rifampin (RIFADIN) 300 MG capsule Take 2 capsules (600 mg) by mouth daily 8/21/18  Yes Pauline Clancy MD   spironolactone (ALDACTONE) 25 MG tablet Take 2 tablets (50 mg) by mouth daily 8/14/18  Yes Tamela Carballo MD   traMADol (ULTRAM) 50 MG tablet Take one po bid prn severe pain 10/24/18  Yes Jaswinder Anne MD   XIFAXAN 550 MG TABS tablet Take 550 mg by mouth 2 times daily  6/28/18  Yes Reported, Patient   furosemide (LASIX) 20 MG tablet Take 1 tablet (20 mg) by mouth daily  Patient not taking: Reported on 11/7/2018 8/14/18   Tamela Carballo MD       Allergies  No Known Allergies    Review of systems  A 10-point review of systems was negative    Examination  /73 (BP Location: Right arm, Patient Position: Sitting, Cuff Size: Adult Regular)  Pulse 60  Resp 18  Ht 1.702 m (5' 7\")  Wt 81.6 kg (180 lb)  BMI 28.19 kg/m2  Body mass index is 28.19 kg/(m^2).    Gen- well, NAD, A+Ox3, normal color  Lym- no palpable LAD  CVS- RRR  RS- CTA  Abd- Not distended. No shifting dullness identified. Reduceable right inguinal hernia.  Extr- hands normal, no OSKAR  Skin- no rash or jaundice  Neuro- no asterixis  Psych- normal mood    Laboratory  Lab Results   Component Value Date     11/07/2018    POTASSIUM 4.2 11/07/2018    CHLORIDE 110 11/07/2018    CO2 24 11/07/2018    BUN 9 11/07/2018    CR 0.53 11/07/2018       Lab Results   Component Value Date    BILITOTAL 2.1 11/07/2018    ALT 38 11/07/2018    AST 49 11/07/2018    ALKPHOS 260 11/07/2018       Lab Results   Component Value Date    ALBUMIN 2.6 11/07/2018    PROTTOTAL 7.2 11/07/2018        Lab Results   Component Value Date    WBC 2.6 11/07/2018    HGB 12.5 " 11/07/2018    MCV 98 11/07/2018    PLT 79 11/07/2018       Lab Results   Component Value Date    INR 1.61 11/07/2018     MELD-Na score: 14 at 11/7/2018 10:10 AM  MELD score: 14 at 11/7/2018 10:10 AM  Calculated from:  Serum Creatinine: 0.53 mg/dL (Rounded to 1) at 11/7/2018 10:10 AM  Serum Sodium: 142 mmol/L (Rounded to 137) at 11/7/2018 10:10 AM  Total Bilirubin: 2.1 mg/dL at 11/7/2018 10:10 AM  INR(ratio): 1.61 at 11/7/2018 10:10 AM  Age: 63 years    Radiology    ASSESSMENT AND PLAN:  Mr. Limon has alcoholic liver disease complicated by hepatocellular carcinoma.  He did have that lesion treated with CT-guided radiofrequency ablation and prior to that he had TACE.  The lesion was 2.5.  There is currently no active disease, although he had some indeterminate lesions which will be followed.  His other issue is some abdominal pain.  He was seen by Dr. Echevarria who thought that he has a right inguinal hernia.  This was present for quite a number of years.  He will have surgery with Dr. Echevarria as his native MELD score is very low at 14 and we suspect that he can tolerate that.  We will continue doing surveillance for HCC.  He will need an upper endoscopy also for esophageal varices, and he will follow for his other healthcare maintenance issues with his primary care physician.  Please note that there is a delay in granting him exception points.  This is 6 months and the rationale is to weed out patients who have progressive disease who can metastasize or fall off the list.      This was a 25-minute visit of which more than 50% was spent in explaining to the patient what our plan of care was.  We answered all his questions.  He will have his health care maintenance issues addressed by his primary physician.       Tmaela Carballo MD  Hepatology  [facility]

## 2018-11-07 NOTE — LETTER
11/7/2018       RE: Loy Limon  [address]  [facility] [address]     Dear Colleague,    Thank you for referring your patient, Loy Limon, to the [facility] HEPATOLOGY at [facility] acute hospital. Please see a copy of my visit note below.    [facility]    Hepatology follow-up    CHIEF COMPLAINT AND REASON FOR VISIT:  Hepatocellular carcinoma, on the transplantation waiting list.      HISTORY OF PRESENT ILLNESS:  Mr. Limon is a 63-year-old [place] immigrant with history significant for alcohol abuse.  He did get diagnosed in 03/2018 with right-sided abdominal pain which then was followed with an imaging, and this showed that he had cirrhosis and also portal hypertension.  There were also lesions concerning for hepatocellular carcinoma and he was sent to be evaluated here for that and considered for liver transplantation.  He did stay away from alcohol from 02/2018 and has currently fulfilled the 6-month sobriety and the recommendations by our .  Regarding his HCC, he did have by Dr. Debora Del Rosario on 06/13/2018 a CT-guided microwave ablation and after that no viable tumor was identified.  Mr. Limon also complains of right inguinal discomfort which led him to visit the emergency room and because of that, he was seen by Dr. Echevarria and he has an inguinal hernia which he plans to operate.  Otherwise, his weight has remained stable, might even gone up.  Appetite is good.  No edema, no jaundice.  He does not have any pruritus.  He has some easy bruising.  Denies also other issues including confusion and memory issues.  He does not have also any GI bleed since he was last seen here.  He is moving his bowels like twice a day with no blood in it.        Medical hx Surgical hx   Past Medical History:   Diagnosis Date     Cancer (H)      Cirrhosis of liver (H) 5/8/2018     Enlarged prostate      Inguinal hernia      Liver lesion 5/8/2018  "     Past Surgical History:   Procedure Laterality Date     APPENDECTOMY       COLONOSCOPY      2018          Medications  Prior to Admission medications    Medication Sig Start Date End Date Taking? Authorizing Provider   alfuzosin (UROXATRAL) 10 MG 24 hr tablet Take 10 mg by mouth daily   Yes Reported, Patient   NADOLOL PO Take 20 mg by mouth daily   Yes Reported, Patient   omeprazole (PRILOSEC) 20 MG CR capsule Take 20 mg by mouth 4/4/18 4/4/19 Yes Reported, Patient   rifampin (RIFADIN) 300 MG capsule Take 2 capsules (600 mg) by mouth daily 8/21/18  Yes Pauline Clancy MD   spironolactone (ALDACTONE) 25 MG tablet Take 2 tablets (50 mg) by mouth daily 8/14/18  Yes Tamela Carballo MD   traMADol (ULTRAM) 50 MG tablet Take one po bid prn severe pain 10/24/18  Yes Jaswinder Anne MD   XIFAXAN 550 MG TABS tablet Take 550 mg by mouth 2 times daily  6/28/18  Yes Reported, Patient   furosemide (LASIX) 20 MG tablet Take 1 tablet (20 mg) by mouth daily  Patient not taking: Reported on 11/7/2018 8/14/18   Tamela Carballo MD       Allergies  No Known Allergies    Review of systems  A 10-point review of systems was negative    Examination  /73 (BP Location: Right arm, Patient Position: Sitting, Cuff Size: Adult Regular)  Pulse 60  Resp 18  Ht 1.702 m (5' 7\")  Wt 81.6 kg (180 lb)  BMI 28.19 kg/m2  Body mass index is 28.19 kg/(m^2).    Gen- well, NAD, A+Ox3, normal color  Lym- no palpable LAD  CVS- RRR  RS- CTA  Abd- Not distended. No shifting dullness identified. Reduceable right inguinal hernia.  Extr- hands normal, no OSKAR  Skin- no rash or jaundice  Neuro- no asterixis  Psych- normal mood    Laboratory  Lab Results   Component Value Date     11/07/2018    POTASSIUM 4.2 11/07/2018    CHLORIDE 110 11/07/2018    CO2 24 11/07/2018    BUN 9 11/07/2018    CR 0.53 11/07/2018       Lab Results   Component Value Date    BILITOTAL 2.1 11/07/2018    ALT 38 11/07/2018    AST 49 " 11/07/2018    ALKPHOS 260 11/07/2018       Lab Results   Component Value Date    ALBUMIN 2.6 11/07/2018    PROTTOTAL 7.2 11/07/2018        Lab Results   Component Value Date    WBC 2.6 11/07/2018    HGB 12.5 11/07/2018    MCV 98 11/07/2018    PLT 79 11/07/2018       Lab Results   Component Value Date    INR 1.61 11/07/2018     MELD-Na score: 14 at 11/7/2018 10:10 AM  MELD score: 14 at 11/7/2018 10:10 AM  Calculated from:  Serum Creatinine: 0.53 mg/dL (Rounded to 1) at 11/7/2018 10:10 AM  Serum Sodium: 142 mmol/L (Rounded to 137) at 11/7/2018 10:10 AM  Total Bilirubin: 2.1 mg/dL at 11/7/2018 10:10 AM  INR(ratio): 1.61 at 11/7/2018 10:10 AM  Age: 63 years    Radiology    ASSESSMENT AND PLAN:  Mr. Limon has alcoholic liver disease complicated by hepatocellular carcinoma.  He did have that lesion treated with CT-guided radiofrequency ablation and prior to that he had TACE.  The lesion was 2.5.  There is currently no active disease, although he had some indeterminate lesions which will be followed.  His other issue is some abdominal pain.  He was seen by Dr. Echevarria who thought that he has a right inguinal hernia.  This was present for quite a number of years.  He will have surgery with Dr. Echevarria as his native MELD score is very low at 14 and we suspect that he can tolerate that.  We will continue doing surveillance for HCC.  He will need an upper endoscopy also for esophageal varices, and he will follow for his other healthcare maintenance issues with his primary care physician.  Please note that there is a delay in granting him exception points.  This is 6 months and the rationale is to weed out patients who have progressive disease who can metastasize or fall off the list.      This was a 25-minute visit of which more than 50% was spent in explaining to the patient what our plan of care was.  We answered all his questions.  He will have his health care maintenance issues addressed by his primary physician.        Taemla Carballo MD  Hepatology  [facility]

## 2018-11-07 NOTE — MR AVS SNAPSHOT
After Visit Summary   11/7/2018    Loy Limon    MRN: [id number]           Patient Information     Date Of Birth          1955        Visit Information        Provider Department      11/7/2018 10:30 AM Víctor Smith Oca; Tamela Carballo MD [facility] Hepatology         Follow-ups after your visit        Follow-up notes from your care team     Return in about 6 months (around 5/7/2019).      Your next 10 appointments already scheduled     Dec 04, 2018  2:00 PM CST   (Arrive by 1:45 PM)   Return Visit with Pauline Clancy MD   [facility] ([facility])    [address] [place] [address]  [address]  [facility] [address]   [phone]            Jan 02, 2019  6:45 AM CST   Lab with [facility] ([facility])    [address] [place] [address]  1st Floor  [facility] [address]   [phone]            Jan 02, 2019  7:00 AM CST   MR ABDOMEN W/O & W CONTRAST with [address] [facility] ([facility])    [address]  1st Floor  [facility] [address]   [phone]           How do I prepare for my exam? (Food and drink instructions) Do not eat or drink for 6 hours prior to exam. **If you will be receiving sedation or general anesthesia, please see special notes below.**  How do I prepare for my exam? (Other instructions) Take your medicines as usual, unless your doctor tells you not to. You may or may not receive IV contrast for this exam pending the discretion of the Radiologist.  **If you will be receiving sedation or general anesthesia, please see special notes below.**  What should I wear: The MRI machine uses a strong magnet. Please wear clothes without metal (snaps, zippers). A sweatsuit works well, or we may give you a hospital gown. Please remove any body piercings and hair extensions before you arrive. You will also remove watches, jewelry,  hairpins, wallets, dentures, partial dental plates and hearing aids. You may wear contact lenses, and you may be able to wear your rings. We have a safe place to keep your personal items, but it is safer to leave them at home.  How long does the exam take: Most tests take 30 to 60 minutes.  HOWEVER, IF YOUR DOCTOR PRESCRIBES ANESTHESIA please plan on spending four to five hours in the recovery room.  What should I bring: Bring a list of your current medicines to your exam (including vitamins, minerals and over-the-counter drugs). Also bring the results of similar scans you may have had.  Do I need a : **If you will be receiving sedation or general anesthesia, please see special notes below.**  What should I do after the exam: No Restrictions, You may resume normal activities.  What is this test: MRI (magnetic resonance imaging) uses a strong magnet and radio waves to look inside the body. An MRA (magnetic resonance angiogram) does the same thing, but it lets us look at your blood vessels. A computer turns the radio waves into pictures showing cross sections of the body, much like slices of bread. This helps us see any problems more clearly. You may receive fluid (called  contrast ) before or during your scan. The fluid helps us see the pictures better. We give the fluid through an IV (small needle in your arm).  Who should I call with questions: If you have any questions, please contact your Imaging Department exam site. Directions, parking instructions, and other information is available on our website, [email or web address].            Ziyad 10, 2019  4:00 PM CST   (Arrive by 3:45 PM)   Return Visit with Hi Melgar MD   [facility] ([facility])    [address] [place] [address]  [address]  [facility] [address]   [phone]            Feb 20, 2019  9:00 AM CST   Lab with  LAB   [facility])    [address] [place] [address]  "Floor  [facility] [address]   [phone]            Feb 20, 2019 10:00 AM CST   (Arrive by 9:45 AM)   Return General Liver with Tamela Carballo MD   [facility] ([facility])    [address]  [address]  [facility] [address]   [phone]            Mar 01, 2019 10:30 AM CST   (Arrive by 10:15 AM)   Return Visit with Khloe Torres MD   [facility] ([facility])    [address]  4th Floor  [facility] [address] [phone]              Who to contact     If you have questions or need follow up information about today's clinic visit or your schedule please contact [facility] HEPATOLOGY directly at [phone].  Normal or non-critical lab and imaging results will be communicated to you by MyChart, letter or phone within 4 business days after the clinic has received the results. If you do not hear from us within 7 days, please contact the clinic through MyChart or phone. If you have a critical or abnormal lab result, we will notify you by phone as soon as possible.  Submit refill requests through [employer] or call your pharmacy and they will forward the refill request to us. Please allow 3 business days for your refill to be completed.          Additional Information About Your Visit        Care EveryWhere ID     This is your Care EveryWhere ID. This could be used by other organizations to access your [place] medical records  [id number]        Your Vitals Were     Pulse Respirations Height BMI (Body Mass Index)          60 18 1.702 m (5' 7\") 28.19 kg/m2         Blood Pressure from Last 3 Encounters:   11/07/18 151/73   11/07/18 155/68   11/05/18 143/72    Weight from Last 3 Encounters:   11/07/18 81.6 kg (180 lb)   11/07/18 82 kg (180 lb 12.8 oz)   11/05/18 83.8 kg (184 lb 12.8 oz)              Today, you had the following     No orders found for display       " Primary Care Provider Office Phone # Fax #    Jaswinder Anne [phone]       [address] [address]        Equal Access to Services     DAVID COWAN : Hadshauna kostas aj gumaroo Sodouglasali, waaxda luqadaha, qaybta kaalmada adeedd, javier ortiz laBrendaannia mckay. So [facility] [phone].    ATENCIÓN: Si habla español, tiene a samaniego disposición servicios gratuitos de asistencia lingüística. Llame al [phone].    We comply with applicable federal civil rights laws and [place] laws. We do not discriminate on the basis of race, color, national origin, age, disability, sex, sexual orientation, or gender identity.            Thank you!     Thank you for choosing [facility] HEPATOLOGY  for your care. Our goal is always to provide you with excellent care. Hearing back from our patients is one way we can continue to improve our services. Please take a few minutes to complete the written survey that you may receive in the mail after your visit with us. Thank you!             Your Updated Medication List - Protect others around you: Learn how to safely use, store and throw away your medicines at www.disposemymeds.org.          This list is accurate as of 11/7/18 11:44 AM.  Always use your most recent med list.                   Brand Name Dispense Instructions for use Diagnosis    alfuzosin 10 MG 24 hr tablet    UROXATRAL     Take 10 mg by mouth daily    Alcoholic cirrhosis of liver without ascites (H), HCC (hepatocellular carcinoma) (H)       furosemide 20 MG tablet    LASIX    60 tablet    Take 1 tablet (20 mg) by mouth daily    Alcoholic cirrhosis of liver without ascites (H), HCC (hepatocellular carcinoma) (H), Edema, unspecified type       NADOLOL PO      Take 20 mg by mouth daily        omeprazole 20 MG CR capsule    priLOSEC     Take 20 mg by mouth        rifampin 300 MG capsule    RIFADIN    60 capsule    Take 2 capsules (600 mg) by mouth daily    Latent tuberculosis  infection       spironolactone 25 MG tablet    ALDACTONE    60 tablet    Take 2 tablets (50 mg) by mouth daily    Alcoholic cirrhosis of liver without ascites (H), HCC (hepatocellular carcinoma) (H), Edema, unspecified type       traMADol 50 MG tablet    ULTRAM    20 tablet    Take one po bid prn severe pain    Right inguinal hernia       XIFAXAN 550 MG Tabs tablet   Generic drug:  rifaximin      Take 550 mg by mouth 2 times daily

## 2018-11-07 NOTE — NURSING NOTE
Chief Complaint   Patient presents with     Results     Patient is here to follow up on results from US and labs     Letter for School/Work     patient is also here for a work note        Luann Ariza, Clinic EMT at 9:10 AM on 11/7/2018

## 2018-11-07 NOTE — LETTER
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    Jaswinder Anne MD

## 2018-11-08 LAB — ETHYL GLUCURONIDE UR QL: NEGATIVE

## 2018-11-16 ENCOUNTER — HOSPITAL ENCOUNTER (OUTPATIENT)
Facility: CLINIC | Age: 63
End: 2018-11-16
Attending: TRANSPLANT SURGERY | Admitting: TRANSPLANT SURGERY
Payer: MEDICARE

## 2018-11-16 DIAGNOSIS — Z76.82 LIVER TRANSPLANT CANDIDATE: Primary | ICD-10-CM

## 2018-11-19 ENCOUNTER — DOCUMENTATION ONLY (OUTPATIENT)
Dept: FAMILY MEDICINE | Facility: CLINIC | Age: 63
End: 2018-11-19

## 2018-11-20 NOTE — PROGRESS NOTES
Visit to the client's home for initial health risk assessment.  An  was present.    Current situation/living environment  Client lives with his wife in a second floor apartment. Their home is neat and tidy and appears well cared for. 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 In August he lifted a crate of mangos and has been having trouble with a hernia since that time. Client is scheduled for hernia surgery 12/27/18. Client has liver cancer and stopped drinking and smoking on 3/3/18. He is currently on liver transplant list.     Activities of daily living (ADL)/instrumental activities of daily living (IADL) and functional issues  Client needs help with the following ADL's: n/a-none  Client needs help with the following IADL's: n/a-none  Client states he is unable to perform the above due to client is independent with ADLs and IADLs      Health concerns for today  Client is on liver transplant list. He quit drinking and smoking on March 3, 2018. At that time he also reduced his sodium intake. Client is very motivated to get healthy and has made a lot of changes in his lifestyle. He is currently participating in outpatient CD treatment through [facility] which will end next month. He feels motivated and supported to maintain his sobriety.  Has patient fallen 2 or more times in the last year? No  Has patient fallen with injury in the last year? No    Cognition/mental health  Client is alert and oriented x4. He denies depression, anxiety, or other mental health symptoms.    STARS/Med Adherence  Client is non-compliant with the following quality measures: n/a  Comments: n/a-up to date    Client's Plan of Care consists of:  No formal services.    Amparo Sandoval, DALLIN  [facility] Care Coordinator  [phone]

## 2018-12-04 ENCOUNTER — OFFICE VISIT (OUTPATIENT)
Dept: INFECTIOUS DISEASES | Facility: CLINIC | Age: 63
End: 2018-12-04
Attending: STUDENT IN AN ORGANIZED HEALTH CARE EDUCATION/TRAINING PROGRAM
Payer: MEDICARE

## 2018-12-04 VITALS
DIASTOLIC BLOOD PRESSURE: 83 MMHG | RESPIRATION RATE: 18 BRPM | HEART RATE: 59 BPM | WEIGHT: 184.5 LBS | SYSTOLIC BLOOD PRESSURE: 168 MMHG | TEMPERATURE: 98.1 F | BODY MASS INDEX: 28.9 KG/M2

## 2018-12-04 DIAGNOSIS — Z23 NEED FOR VACCINATION: ICD-10-CM

## 2018-12-04 DIAGNOSIS — Z22.7 LATENT TUBERCULOSIS INFECTION: Primary | ICD-10-CM

## 2018-12-04 PROCEDURE — 25000128 H RX IP 250 OP 636: Mod: ZF | Performed by: STUDENT IN AN ORGANIZED HEALTH CARE EDUCATION/TRAINING PROGRAM

## 2018-12-04 PROCEDURE — G0009 ADMIN PNEUMOCOCCAL VACCINE: HCPCS | Mod: ZF

## 2018-12-04 PROCEDURE — G0463 HOSPITAL OUTPT CLINIC VISIT: HCPCS | Mod: 25,ZF

## 2018-12-04 PROCEDURE — 90732 PPSV23 VACC 2 YRS+ SUBQ/IM: CPT | Mod: ZF | Performed by: STUDENT IN AN ORGANIZED HEALTH CARE EDUCATION/TRAINING PROGRAM

## 2018-12-04 PROCEDURE — T1013 SIGN LANG/ORAL INTERPRETER: HCPCS | Mod: U3,ZF

## 2018-12-04 RX ADMIN — PNEUMOCOCCAL VACCINE POLYVALENT 0.5 ML
25; 25; 25; 25; 25; 25; 25; 25; 25; 25; 25; 25; 25; 25; 25; 25; 25; 25; 25; 25; 25; 25; 25 INJECTION, SOLUTION INTRAMUSCULAR; SUBCUTANEOUS at 14:52

## 2018-12-04 ASSESSMENT — PAIN SCALES - GENERAL: PAINLEVEL: MILD PAIN (3)

## 2018-12-04 NOTE — PROGRESS NOTES
[facility]  Transplant Infectious Disease Consultation note  Today's Date: 12/04/2018    Recommendations:  - continue rifampin 600 mg daily x 1.5 more months to complete a 4 month course  -called the pharmacy as he is supposed to be done in 2 weeks but still has 1.5 months left.   He picked up 30 day supply of rifampin on 11/9, 10/1 and 8/22. This means he is likely missing doses. I reaffirmed with whom that he should not be missing doses which he seems to understand.   - pneumovax today   - RTC in 2 months      Thank you for involving me in the care of this patient. Please do not hesitate to contact me with any questions.    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     Latent TB: with a positive quant and being from [place] where TB incidence is high and in the absence of symptoms or signs of active TB, would consider him to have latent TB. Recommend rifampin for 4 months. No major drug interactions but due to liver cirrhosis, will follow LFTS closely. Discussed symptoms of liver toxicity and side effects of rifampin and answered all questions about the concept of latent Tb that patient had a little difficulty in understanding.    He is supposed to complete 4 months treatment in 2-3 weeks but the rifampin bottle that he has states it was filled 11/9 for 30 days and he has 14 days worth of meds left and 1 refill. Therefore I am not sure if he started the med late or if he has been missing doses. Will have to call the pharmacy to verify. Liver enzymes are stable.     - Serostatus: CMV R+, EBV R+  - Immunization status: Pneumovax today, otherwise up to date   - Prophylaxis: none      Attestation: I have reviewed today's vital signs, medications, labs and imaging. I personally reviewed the imaging. Reviewed medical history, surgical  history, and family history in Epic History tab. No updates needed to be made.      Pauline Clancy  , [facility]  Pager - [phone]    -------------------------------------------------------------------------------------------------------------------   Interval events: Last seen 3 months ago.   Started rifampin end of august. He reports he missed only one dose. Takes it at 5.30 in the morning and has  Breakfast at 7 am. His urine is very dark red for 4-5 episodes and then it becomes lighter coloured. He is upset that it makes him pee more but I discussed that rifampin does not do that but rather diuretics. He complained that diuretics are not working as he still has fluid in legs and belly and was told by the liver doctor that he has to choose between the kidney and edema.     He is supposed to complete 4 months treatment in 2-3 weeks but the rifampin bottle that he has states it was filled 11/9 for 30 days and he has 14 days worth of meds left and 1 refill. Therefore I am not sure if he started the med late or if he has been missing doses. Will have to call the pharmacy to verify. Liver enzymes are stable.     Strongy, toxoplasma and cysticercus Ab negative     Reason for consult / Chief complaint: 8/21/18  Consulted by Dr. Carballo for latent TB    History of presenting illness: Patient presents with   Loy Braxton is a 63 year old with medical history significant for cirrhosis of liver from alcohol use, HCC being evaluated for liver transplantation. As part of that work up, quantiferon was done and found to be positive at a value of around 1 and was thus referred to me.     He was born and raised in [place]. Came to the [place] around 35 years ago, goes back to [place] every year. He does not remember being exposed to anybody with TB. He does not recall having PPD or quant before. He reports, his wifes brother had tuberculosis, but he not closely associated with him. He  denies symptoms of active TB. Ct chest done 5/2018 does not show evidence of active pulmn TB.     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 He is requesting an extension of work restriction letter for hernia (for heavy load lifting) and 36 hour work limit per week. He does not remember who gave the letter before and needs it tomorrow to continue working.     Social Hx:  Social History   Substance Use Topics     Smoking status: Former Smoker     Packs/day: 0.03     Years: 20.00     Types: Cigarettes, Cigars     Start date: 8/14/1970     Quit date: 3/14/2018     Smokeless tobacco: Never Used      Comment: Quit smoking Feb 2018, one cigarette after dinner     Alcohol use No      Comment: Quit drinking Feb 2018       Immunizations:  Immunization History   Administered Date(s) Administered     Influenza (High Dose) 3 valent vaccine 10/24/2018     Pneumo Conj 13-V (2010&after) 08/21/2018     Pneumococcal 23 valent 12/04/2018     TDAP Vaccine (Boostrix) 08/21/2018     Zoster vaccine recombinant adjuvanted (SHINGRIX) 08/21/2018, 10/24/2018       Allergies:   No Known Allergies    Medications:  Current Outpatient Prescriptions   Medication     alfuzosin (UROXATRAL) 10 MG 24 hr tablet     NADOLOL PO     omeprazole (PRILOSEC) 20 MG CR capsule     rifampin (RIFADIN) 300 MG capsule     traMADol (ULTRAM) 50 MG tablet     XIFAXAN 550 MG TABS tablet     furosemide (LASIX) 20 MG tablet     spironolactone (ALDACTONE) 25 MG tablet     No current facility-administered medications for this visit.        Past Medical Hx:  Past Medical History:   Diagnosis Date     Cancer (H)     hepatocellualr carcinoma     Cirrhosis of liver (H) 5/8/2018     Enlarged prostate      Inguinal hernia      Liver lesion 5/8/2018         Family History:  Family History   Problem Relation Age of Onset     Liver Disease Brother          Review of Systems:  10 systems reviewed,  pertinent positives noted in my HPI.      Examination:  Vital signs:   /83  Pulse 59  Temp 98.1  F (36.7  C) (Oral)  Resp 18  Wt 83.7 kg (184 lb 8 oz)  BMI 28.9 kg/m2    Constitutional: Patient in no distress  Eyes: not pale, mildly jaundiced  Neck: no lymphadenopathy  CVS: no added sounds  RS: clear  Abdomen: soft, BS+  Skin: no rash  Extremities: bilateral pitting pedal edema  Psych: Alert and oriented x 3    Laboratory:  Hematology:  Recent Labs   Lab Test  18   1010  10/24/18   1118  10/03/18   0923   WBC  2.6*  3.2*  2.6*   RBC  3.91*  3.64*  3.36*   HGB  12.5*  12.0*  11.1*   HCT  38.3*  36.2*  32.9*   MCV  98  100  98   MCH  32.0  33.0  33.0   MCHC  32.6  33.1  33.7   RDW  13.2  13.5  13.3   PLT  79*  78*  95*       Chemistry:  Recent Labs   Lab Test  18   1010  10/24/18   1118  10/12/18   0812  18   1528   NA  142  138  138  139   POTASSIUM  4.2  4.3   --   4.1   CHLORIDE  110*  109   --   108   CO2  24  24   --   26   ANIONGAP  8  5   --   5   GLC  92  96   --   178*   BUN  9  9   --   7   CR  0.53*  0.58*  0.64*  0.51*   YELENA  8.2*  8.3*   --   8.0*       Liver Function Studies:     Recent Labs   Lab Test  18   1010  10/24/18   1118  10/12/18   0812  18   1528   PROTTOTAL  7.2  6.9   --   6.8   ALBUMIN  2.6*  2.5*  2.6*  2.4*   BILITOTAL  2.1*  2.5*  1.9*  1.5*   ALKPHOS  260*  255*   --   253*   AST  49*  48*   --   45   ALT  38  40   --   38       Microbiology:  18   Quant positive     Strongy, toxoplasma and cysticercus Ab negative     Imagin2018   CT chest   Multifocal area of groundglass opacities of the bilateral  lower lobes, to lesser extent left upper lobes. Differentials  including focal atelectasis versus infection. Short-term follow-up to  make sure resolution is recommended.   1.  4 mm part solid pulmonary nodule within the right lower lobe  posterior medially, attention on follow-up is recommended.  3. Right hepatic lobe hypoattenuating  lesion measures 3.8 x 3.0 cm.  Hypoattenuating lesion in the posterior right hepatic lobe measures  1.9 cm. Limited evaluation due to single phase study. Please refer to  same-day MRI study for further characterization.  4. Gallbladder wall thickening. Consider ultrasound.

## 2018-12-04 NOTE — MR AVS SNAPSHOT
After Visit Summary   12/4/2018    Loy Limon    MRN: [id number]           Patient Information     Date Of Birth          1955        Visit Information        Provider Department      12/4/2018 1:45 PM Chris Marquez Archana, MD [facility] and Infectious Diseases        Today's Diagnoses     Need for vaccination    -  1       Follow-ups after your visit        Follow-up notes from your care team     Return in about 2 months (around 2/4/2019).      Your next 10 appointments already scheduled     Dec 27, 2018   Procedure with Emil Echevarria MD   [facility], [place], Same Day Surgery (--)    [address] [facility] [address]   [phone]            Jan 02, 2019  6:45 AM CST   Lab with [facility] ([facility])    [address] [place] [id number] [phone]            Jan 02, 2019  7:00 AM CST   MR ABDOMEN W/O & W CONTRAST with [address] [facility] ([facility])    [address] [place] [id number] [phone]           How do I prepare for my exam? (Food and drink instructions) Do not eat or drink for 6 hours prior to exam. **If you will be receiving sedation or general anesthesia, please see special notes below.**  How do I prepare for my exam? (Other instructions) Take your medicines as usual, unless your doctor tells you not to. You may or may not receive IV contrast for this exam pending the discretion of the Radiologist.  **If you will be receiving sedation or general anesthesia, please see special notes below.**  What should I wear: The MRI machine uses a strong magnet. Please wear clothes without metal (snaps, zippers). A sweatsuit works well, or we may give you a hospital gown. Please remove any body piercings and hair extensions before you arrive. You will also remove watches, jewelry, hairpins, wallets, dentures, partial  dental plates and hearing aids. You may wear contact lenses, and you may be able to wear your rings. We have a safe place to keep your personal items, but it is safer to leave them at home.  How long does the exam take: Most tests take 30 to 60 minutes.  HOWEVER, IF YOUR DOCTOR PRESCRIBES ANESTHESIA please plan on spending four to five hours in the recovery room.  What should I bring: Bring a list of your current medicines to your exam (including vitamins, minerals and over-the-counter drugs). Also bring the results of similar scans you may have had.  Do I need a : **If you will be receiving sedation or general anesthesia, please see special notes below.**  What should I do after the exam: No Restrictions, You may resume normal activities.  What is this test: MRI (magnetic resonance imaging) uses a strong magnet and radio waves to look inside the body. An MRA (magnetic resonance angiogram) does the same thing, but it lets us look at your blood vessels. A computer turns the radio waves into pictures showing cross sections of the body, much like slices of bread. This helps us see any problems more clearly. You may receive fluid (called  contrast ) before or during your scan. The fluid helps us see the pictures better. We give the fluid through an IV (small needle in your arm).  Who should I call with questions: If you have any questions, please contact your Imaging Department exam site. Directions, parking instructions, and other information is available on our website, [email or web address].            Ziyad 10, 2019  4:00 PM CST   (Arrive by 3:45 PM)   Return Visit with Hi Melgar MD   [facility] ([facility])    [address] [place] [address]  [address] [place] [id number] [phone]            Feb 12, 2019  2:00 PM CST   (Arrive by 1:45 PM)   Return Visit with Pauline Clancy MD   [facility] and Infectious Diseases [facility]  [place])    [address]  [address]  [facility] [address] [phone]            Feb 20, 2019  9:00 AM CST   Lab with  LAB   [facility] ([facility])    [address] [place] [address]  1st Floor  [facility] [id number] [phone]            Feb 20, 2019 10:00 AM CST   (Arrive by 9:45 AM)   Return General Liver with Tamela Carballo MD   [facility] Hepatology ([facility])    [address] [place] [address]  [address]  [facility] [id number] [phone]            Mar 01, 2019 10:30 AM CST   (Arrive by 10:15 AM)   Return Visit with Khloe Torres MD   [facility]rology [facility] for Prostate and Urologic Cancers ([facility])    [address] [place] [address]  4th Floor  [facility] [id number] [phone]              Who to contact     If you have questions or need follow up information about today's clinic visit or your schedule please contact [facility] AND INFECTIOUS DISEASES directly at [phone].  Normal or non-critical lab and imaging results will be communicated to you by MyChart, letter or phone within 4 business days after the clinic has received the results. If you do not hear from us within 7 days, please contact the clinic through MyChart or phone. If you have a critical or abnormal lab result, we will notify you by phone as soon as possible.  Submit refill requests through [employer] - Private Location Appt or call your pharmacy and they will forward the refill request to us. Please allow 3 business days for your refill to be completed.          Additional Information About Your Visit        Care EveryWhere ID     This is your Care EveryWhere ID. This could be used by other organizations to access your [facility] medical records  [id number]        Your Vitals Were     Pulse Temperature Respirations BMI (Body Mass Index)          59 98.1  F (36.7  C) (Oral) 18 28.9 kg/m2         Blood Pressure from Last 3 Encounters:    12/04/18 168/83   11/07/18 151/73   11/07/18 155/68    Weight from Last 3 Encounters:   12/04/18 83.7 kg (184 lb 8 oz)   11/07/18 81.6 kg (180 lb)   11/07/18 82 kg (180 lb 12.8 oz)              Today, you had the following     No orders found for display       Primary Care Provider Office Phone # Fax #    Jaswinder Anne [phone]       [address] [address]        Equal Access to Services     [facility]: Hadii aad ku hadasho Soomaali, waaxda luqadaha, qaybta kaalmada adeshamayadeni, javier rico . So [facility] [phone].    ATENCIÓN: Si habla español, tiene a samaniego disposición servicios gratuitos de asistencia lingüística. [facility] [phone].    We comply with applicable federal civil rights laws and [place] laws. We do not discriminate on the basis of race, color, national origin, age, disability, sex, sexual orientation, or gender identity.            Thank you!     Thank you for choosing [facility] AND INFECTIOUS DISEASES  for your care. Our goal is always to provide you with excellent care. Hearing back from our patients is one way we can continue to improve our services. Please take a few minutes to complete the written survey that you may receive in the mail after your visit with us. Thank you!             Your Updated Medication List - Protect others around you: Learn how to safely use, store and throw away your medicines at www.disposemymeds.org.          This list is accurate as of 12/4/18  3:02 PM.  Always use your most recent med list.                   Brand Name Dispense Instructions for use Diagnosis    alfuzosin ER 10 MG 24 hr tablet    UROXATRAL     Take 10 mg by mouth daily    Alcoholic cirrhosis of liver without ascites (H), HCC (hepatocellular carcinoma) (H)       furosemide 20 MG tablet    LASIX    60 tablet    Take 1 tablet (20 mg) by mouth daily    Alcoholic cirrhosis of liver without ascites (H), HCC  (hepatocellular carcinoma) (H), Edema, unspecified type       NADOLOL PO      Take 20 mg by mouth daily        omeprazole 20 MG DR capsule    priLOSEC     Take 20 mg by mouth        rifampin 300 MG capsule    RIFADIN    60 capsule    Take 2 capsules (600 mg) by mouth daily    Latent tuberculosis infection       spironolactone 25 MG tablet    ALDACTONE    60 tablet    Take 2 tablets (50 mg) by mouth daily    Alcoholic cirrhosis of liver without ascites (H), HCC (hepatocellular carcinoma) (H), Edema, unspecified type       traMADol 50 MG tablet    ULTRAM    20 tablet    Take one po bid prn severe pain    Right inguinal hernia       XIFAXAN 550 MG Tabs tablet   Generic drug:  rifaximin      Take 550 mg by mouth 2 times daily

## 2018-12-04 NOTE — NURSING NOTE
Chief Complaint   Patient presents with     RECHECK     1 mo follow up     Blood pressure 168/83, pulse 59, temperature 98.1  F (36.7  C), temperature source Oral, resp. rate 18, weight 83.7 kg (184 lb 8 oz).    SALAS CAMPA CMA

## 2018-12-10 DIAGNOSIS — K76.82 HEPATIC ENCEPHALOPATHY (H): ICD-10-CM

## 2018-12-12 RX ORDER — RIFAXIMIN 550 MG/1
TABLET ORAL
Qty: 60 TABLET | Refills: 3 | Status: ON HOLD | OUTPATIENT
Start: 2018-12-12 | End: 2018-12-24

## 2018-12-20 ENCOUNTER — OFFICE VISIT (OUTPATIENT)
Dept: INTERNAL MEDICINE | Facility: CLINIC | Age: 63
End: 2018-12-20
Payer: MEDICARE

## 2018-12-20 VITALS
DIASTOLIC BLOOD PRESSURE: 75 MMHG | SYSTOLIC BLOOD PRESSURE: 149 MMHG | HEART RATE: 52 BPM | OXYGEN SATURATION: 96 % | BODY MASS INDEX: 28.69 KG/M2 | WEIGHT: 183.2 LBS

## 2018-12-20 DIAGNOSIS — Z01.818 PREOP GENERAL PHYSICAL EXAM: Primary | ICD-10-CM

## 2018-12-20 ASSESSMENT — PAIN SCALES - GENERAL: PAINLEVEL: NO PAIN (0)

## 2018-12-20 ASSESSMENT — ENCOUNTER SYMPTOMS
NAIL CHANGES: 0
SKIN CHANGES: 0
POOR WOUND HEALING: 0

## 2018-12-20 NOTE — PROGRESS NOTES
Surgeon:  Emil Echevarria MD  Location of Surgery: [facility] OR  Date of Surgery:  12/27/18  Procedure:  Open Right Inguinal Hernia With Mesh

## 2018-12-20 NOTE — PROGRESS NOTES
[facility] PRIMARY CARE CLINIC  [address] [facility]  [address]th [facility] [id number] [phone]    PRE-OP EVALUATION:  Today's date: 2018    Loy Limon (: 1955) presents for pre-operative evaluation assessment as requested by Emil Gerard MD.  He requires evaluation and anesthesia risk assessment prior to undergoing surgery/procedure for treatment of right inguinal hernia      Proposed Surgery/ Procedure: Open Right Inguinal Hernia With Mesh  Date of Surgery/ Procedure: 18  Time of Surgery/ Procedure: 7:45am   Hospital/Surgical Facility:[facility]  Primary Physician: Jaswinder Anne  Type of Anesthesia Anticipated: General      HPI:     HPI related to upcoming procedure:     Mr. Boyer is a 63-year-old male with past medical history significant for cirrhosis of the liver secondary to alcohol abuse and hepatocellular carcinoma. Patient has establish primary care, but is here today for preop assessment. Patient states he has been in his usual state of health and anticipates upcoming surgery.  Since last visit with primary care in November patient reports no new symptoms or complaints.  He states liver disease is stable and follows with  in GI.    1. NO - Do you have a history of heart attack, stroke, stent, bypass or surgery on an artery in the head, neck, heart or legs?  2. NO - Do you ever have any pain or discomfort in your chest?  3. NO - Do you have a history of  Heart Failure?  4. NO - Are you troubled by shortness of breath when: walking on the level, up a slight hill or at night?  5. NO - Do you currently have a cold, bronchitis or other respiratory infection?  6. NO - Do you have a cough, shortness of breath or wheezing?  7. NO - Do you sometimes get pains in the calves of your legs when you walk?  8. Yes  - Do you or anyone in your family have previous history of blood clots? His father  9. NO - Do you or does anyone in your family have a serious bleeding  problem such as prolonged bleeding following surgeries or cuts?  10. NO - Have you ever had problems with anemia or been told to take iron pills?  11. NO - Have you had any abnormal blood loss such as black, tarry or bloody stools, or abnormal vaginal bleeding?  12. NO - Have you ever had a blood transfusion?  13. NO - Have you or any of your relatives ever had problems with anesthesia?  14. NO - Do you have sleep apnea, excessive snoring or daytime drowsiness?  15. NO - Do you have any prosthetic heart valves?  16. NO - Do you have prosthetic joints?    See problem list for active medical problems.  Problems all longstanding and stable, except as noted/documented.  See ROS for pertinent symptoms related to these conditions.                                                                                                                                                          .    MEDICAL HISTORY:     Patient Active Problem List    Diagnosis Date Noted     HCC (hepatocellular carcinoma) (H) 06/04/2018     Priority: Medium     Cirrhosis of liver (H) 05/08/2018     Priority: Medium     Liver lesion 05/08/2018     Priority: Medium      Past Medical History:   Diagnosis Date     Cancer (H)     hepatocellualr carcinoma     Cirrhosis of liver (H) 5/8/2018     Enlarged prostate      Inguinal hernia      Liver lesion 5/8/2018     Past Surgical History:   Procedure Laterality Date     APPENDECTOMY       COLONOSCOPY      2018     Current Outpatient Medications   Medication Sig Dispense Refill     NADOLOL PO Take 20 mg by mouth daily       omeprazole (PRILOSEC) 20 MG CR capsule Take 20 mg by mouth       rifampin (RIFADIN) 300 MG capsule Take 2 capsules (600 mg) by mouth daily 60 capsule 3     traMADol (ULTRAM) 50 MG tablet Take one po bid prn severe pain 20 tablet 0     XIFAXAN 550 MG TABS tablet TAKE ONE TABLET BY MOUTH TWO TIMES A DAY 60 tablet 3     XIFAXAN 550 MG TABS tablet Take 550 mg by mouth 2 times daily         alfuzosin (UROXATRAL) 10 MG 24 hr tablet Take 10 mg by mouth daily       furosemide (LASIX) 20 MG tablet Take 1 tablet (20 mg) by mouth daily (Patient not taking: Reported on 2018) 60 tablet 3     spironolactone (ALDACTONE) 25 MG tablet Take 2 tablets (50 mg) by mouth daily (Patient not taking: Reported on 2018) 60 tablet 3     OTC products: no recent use of OTC ASA, NSAIDS or Steroids    No Known Allergies   Latex Allergy: NO    Social History     Tobacco Use     Smoking status: Former Smoker     Packs/day: 0.03     Years: 20.00     Pack years: 0.60     Types: Cigarettes, Cigars     Start date: 1970     Last attempt to quit: 3/14/2018     Years since quittin.7     Smokeless tobacco: Never Used     Tobacco comment: Quit smoking 2018, one cigarette after dinner   Substance Use Topics     Alcohol use: No     Comment: Quit drinking 2018     History   Drug Use No       REVIEW OF SYSTEMS:   Constitutional, neuro, ENT, endocrine, pulmonary, cardiac, gastrointestinal, genitourinary, musculoskeletal, integument and psychiatric systems are negative, except as otherwise noted.    EXAM:   /75   Pulse 52   Wt 83.1 kg (183 lb 3.2 oz)   SpO2 96%   BMI 28.69 kg/m      GENERAL APPEARANCE: healthy, alert and no distress     EYES: EOMI,  PERRL     HENT: ear canals and TM's normal and nose and mouth without ulcers or lesions     RESP: lungs clear to auscultation - no rales, rhonchi or wheezes     CV: regular rates and rhythm, normal S1 S2, no S3 or S4 and no murmur, click or rub     ABDOMEN:  soft, mild tenderness to palpation RLQ and LLQ bowel sounds normal     MS: extremities normal- no gross deformities noted, no evidence of inflammation in joints, FROM in all extremities.     SKIN: no suspicious lesions or rashes     NEURO: Normal strength and tone, sensory exam grossly normal, mentation intact and speech normal     PSYCH: mentation appears normal. and affect normal/bright    DIAGNOSTICS:    EKG: Not indicated due to non-vascular surgery and low risk of event (age <65 and without cardiac risk factors)    Recent Labs   Lab Test 11/07/18  1010 10/24/18  1118 10/12/18  0812   HGB 12.5* 12.0*  --    PLT 79* 78*  --    INR 1.61*  --  2.10*    138 138   POTASSIUM 4.2 4.3  --    CR 0.53* 0.58* 0.64*        IMPRESSION:   Reason for surgery/procedure: Open Right Inguinal Hernia With Mesh    The proposed surgical procedure is considered INTERMEDIATE risk.    REVISED CARDIAC RISK INDEX  The patient has the following serious cardiovascular risks for perioperative complications such as (MI, PE, VFib and 3  AV Block):  No serious cardiac risks  INTERPRETATION: 0 risks: Class I (very low risk - 0.4% complication rate)    The patient has the following additional risks for perioperative complications:  No identified additional risks      ICD-10-CM    1. Preop general physical exam Z01.818        RECOMMENDATIONS:     --Consult hospital rounder / IM to assist post-op medical management    --Patient is to take all scheduled medications on the day of surgery without modifications.    APPROVAL GIVEN to proceed with proposed procedure, without further diagnostic evaluation       Signed Electronically by: Juhi Torres MD    Copy of this evaluation report is provided to requesting physician.  Pt was seen and examined with Dr. Torres.  I agree with her documentation as noted above.    My additional comments: None    Nick Cosme [facility] Preop Guidelines    Revised Cardiac Risk Index

## 2018-12-20 NOTE — LETTER
2018    RE: Loy Limon  [address]  [facility] [address]       Surgeon:  Emil Echevarria MD  Location of Surgery: [facility]  Date of Surgery:  18  Procedure:  Open Right Inguinal Hernia With Mesh         [facility]  [address] [facility] [address] [facility]  4th Floor  [facility] [id number] [phone]    PRE-OP EVALUATION:  Today's date: 2018    Loy Limon (: 1955) presents for pre-operative evaluation assessment as requested by Emil Gerard MD.  He requires evaluation and anesthesia risk assessment prior to undergoing surgery/procedure for treatment of right inguinal hernia      Proposed Surgery/ Procedure: Open Right Inguinal Hernia With Mesh  Date of Surgery/ Procedure: 18  Time of Surgery/ Procedure: 7:45am   Hospital[facility]Surgical Facility:[facility]  Primary Physician: Jaswinder Anne  Type of Anesthesia Anticipated: General      HPI:     HPI related to upcoming procedure:     Mr. Boyer is a 63-year-old male with past medical history significant for cirrhosis of the liver secondary to alcohol abuse and hepatocellular carcinoma. Patient has establish primary care, but is here today for preop assessment. Patient states he has been in his usual state of health and anticipates upcoming surgery.  Since last visit with primary care in November patient reports no new symptoms or complaints.  He states liver disease is stable and follows with  in GI.    1. NO - Do you have a history of heart attack, stroke, stent, bypass or surgery on an artery in the head, neck, heart or legs?  2. NO - Do you ever have any pain or discomfort in your chest?  3. NO - Do you have a history of  Heart Failure?  4. NO - Are you troubled by shortness of breath when: walking on the level, up a slight hill or at night?  5. NO - Do you currently have a cold, bronchitis or other respiratory infection?  6. NO - Do you have a cough, shortness of breath or  wheezing?  7. NO - Do you sometimes get pains in the calves of your legs when you walk?  8. Yes  - Do you or anyone in your family have previous history of blood clots? His father  9. NO - Do you or does anyone in your family have a serious bleeding problem such as prolonged bleeding following surgeries or cuts?  10. NO - Have you ever had problems with anemia or been told to take iron pills?  11. NO - Have you had any abnormal blood loss such as black, tarry or bloody stools, or abnormal vaginal bleeding?  12. NO - Have you ever had a blood transfusion?  13. NO - Have you or any of your relatives ever had problems with anesthesia?  14. NO - Do you have sleep apnea, excessive snoring or daytime drowsiness?  15. NO - Do you have any prosthetic heart valves?  16. NO - Do you have prosthetic joints?    See problem list for active medical problems.  Problems all longstanding and stable, except as noted/documented.  See ROS for pertinent symptoms related to these conditions.                                                                                                                                                          .    MEDICAL HISTORY:     Patient Active Problem List    Diagnosis Date Noted     HCC (hepatocellular carcinoma) (H) 06/04/2018     Priority: Medium     Cirrhosis of liver (H) 05/08/2018     Priority: Medium     Liver lesion 05/08/2018     Priority: Medium      Past Medical History:   Diagnosis Date     Cancer (H)     hepatocellualr carcinoma     Cirrhosis of liver (H) 5/8/2018     Enlarged prostate      Inguinal hernia      Liver lesion 5/8/2018     Past Surgical History:   Procedure Laterality Date     APPENDECTOMY       COLONOSCOPY      2018     Current Outpatient Medications   Medication Sig Dispense Refill     NADOLOL PO Take 20 mg by mouth daily       omeprazole (PRILOSEC) 20 MG CR capsule Take 20 mg by mouth       rifampin (RIFADIN) 300 MG capsule Take 2 capsules (600 mg) by mouth daily 60  capsule 3     traMADol (ULTRAM) 50 MG tablet Take one po bid prn severe pain 20 tablet 0     XIFAXAN 550 MG TABS tablet TAKE ONE TABLET BY MOUTH TWO TIMES A DAY 60 tablet 3     XIFAXAN 550 MG TABS tablet Take 550 mg by mouth 2 times daily        alfuzosin (UROXATRAL) 10 MG 24 hr tablet Take 10 mg by mouth daily       furosemide (LASIX) 20 MG tablet Take 1 tablet (20 mg) by mouth daily (Patient not taking: Reported on 2018) 60 tablet 3     spironolactone (ALDACTONE) 25 MG tablet Take 2 tablets (50 mg) by mouth daily (Patient not taking: Reported on 2018) 60 tablet 3     OTC products: no recent use of OTC ASA, NSAIDS or Steroids    No Known Allergies   Latex Allergy: NO    Social History     Tobacco Use     Smoking status: Former Smoker     Packs/day: 0.03     Years: 20.00     Pack years: 0.60     Types: Cigarettes, Cigars     Start date: 1970     Last attempt to quit: 3/14/2018     Years since quittin.7     Smokeless tobacco: Never Used     Tobacco comment: Quit smoking 2018, one cigarette after dinner   Substance Use Topics     Alcohol use: No     Comment: Quit drinking 2018     History   Drug Use No       REVIEW OF SYSTEMS:   Constitutional, neuro, ENT, endocrine, pulmonary, cardiac, gastrointestinal, genitourinary, musculoskeletal, integument and psychiatric systems are negative, except as otherwise noted.    EXAM:   /75   Pulse 52   Wt 83.1 kg (183 lb 3.2 oz)   SpO2 96%   BMI 28.69 kg/m       GENERAL APPEARANCE: healthy, alert and no distress     EYES: EOMI,  PERRL     HENT: ear canals and TM's normal and nose and mouth without ulcers or lesions     RESP: lungs clear to auscultation - no rales, rhonchi or wheezes     CV: regular rates and rhythm, normal S1 S2, no S3 or S4 and no murmur, click or rub     ABDOMEN:  soft, mild tenderness to palpation RLQ and LLQ bowel sounds normal     MS: extremities normal- no gross deformities noted, no evidence of inflammation in joints, FROM  in all extremities.     SKIN: no suspicious lesions or rashes     NEURO: Normal strength and tone, sensory exam grossly normal, mentation intact and speech normal     PSYCH: mentation appears normal. and affect normal/bright    DIAGNOSTICS:   EKG: Not indicated due to non-vascular surgery and low risk of event (age <65 and without cardiac risk factors)    Recent Labs   Lab Test 11/07/18  1010 10/24/18  1118 10/12/18  0812   HGB 12.5* 12.0*  --    PLT 79* 78*  --    INR 1.61*  --  2.10*    138 138   POTASSIUM 4.2 4.3  --    CR 0.53* 0.58* 0.64*        IMPRESSION:   Reason for surgery/procedure: Open Right Inguinal Hernia With Mesh    The proposed surgical procedure is considered INTERMEDIATE risk.    REVISED CARDIAC RISK INDEX  The patient has the following serious cardiovascular risks for perioperative complications such as (MI, PE, VFib and 3  AV Block):  No serious cardiac risks  INTERPRETATION: 0 risks: Class I (very low risk - 0.4% complication rate)    The patient has the following additional risks for perioperative complications:  No identified additional risks      ICD-10-CM    1. Preop general physical exam Z01.818        RECOMMENDATIONS:     --Consult hospital rounder / IM to assist post-op medical management    --Patient is to take all scheduled medications on the day of surgery without modifications.    APPROVAL GIVEN to proceed with proposed procedure, without further diagnostic evaluation       Signed Electronically by: Juhi Torres MD    Copy of this evaluation report is provided to requesting physician.  Pt was seen and examined with Dr. Torres.  I agree with her documentation as noted above.    My additional comments: None    Nick [facility] Preop Guidelines    Revised Cardiac Risk Index    Juhi Torres MD

## 2018-12-20 NOTE — NURSING NOTE
Chief Complaint   Patient presents with     Pre-Op Exam     Pt is here for a pre-op exam.      Radha Mejias LPN at 8:15 AM on 12/20/2018.

## 2018-12-20 NOTE — PATIENT INSTRUCTIONS
[facility] Medication Refill Request Information:  * Please contact your pharmacy regarding ANY request for medication refills.  ** [facility] Prescription Fax = [phone]  * Please allow 3 business days for routine medication refills.  * Please allow 5 business days for controlled substance medication refills.     [facility] Test Result notification information:  *You will be notified with in 7-10 days of your appointment day regarding the results of your test.  If you are on MyChart you will be notified as soon as the provider has reviewed the results and signed off on them.    [facility]: [phone]     Before Your Surgery      Call your surgeon if there is any change in your health. This includes signs of a cold or flu (such as a sore throat, runny nose, cough, rash or fever).    Do not smoke, drink alcohol or take over the counter medicine (unless your surgeon or primary care doctor tells you to) for the 24 hours before and after surgery.    If you take prescribed drugs: Follow your doctor s orders about which medicines to take and which to stop until after surgery.    Eating and drinking prior to surgery: follow the instructions from your surgeon    Take a shower or bath the night before surgery. Use the soap your surgeon gave you to gently clean your skin. If you do not have soap from your surgeon, use your regular soap. Do not shave or scrub the surgery site.  Wear clean pajamas and have clean sheets on your bed.

## 2018-12-21 ENCOUNTER — HOSPITAL ENCOUNTER (INPATIENT)
Facility: CLINIC | Age: 63
LOS: 17 days | Discharge: HOME HEALTH | DRG: 005 | End: 2019-01-07
Attending: TRANSPLANT SURGERY | Admitting: TRANSPLANT SURGERY
Payer: MEDICARE

## 2018-12-21 ENCOUNTER — ORGAN (OUTPATIENT)
Dept: TRANSPLANT | Facility: CLINIC | Age: 63
End: 2018-12-21

## 2018-12-21 DIAGNOSIS — R73.9 HYPERGLYCEMIA: ICD-10-CM

## 2018-12-21 DIAGNOSIS — Z94.4 LIVER TRANSPLANT RECIPIENT (H): Primary | ICD-10-CM

## 2018-12-21 DIAGNOSIS — D84.9 IMMUNOSUPPRESSED STATUS (H): Chronic | ICD-10-CM

## 2018-12-21 DIAGNOSIS — E87.79 OTHER HYPERVOLEMIA: ICD-10-CM

## 2018-12-21 DIAGNOSIS — I48.91 ATRIAL FIBRILLATION WITH RAPID VENTRICULAR RESPONSE (H): ICD-10-CM

## 2018-12-21 DIAGNOSIS — I10 ESSENTIAL HYPERTENSION: ICD-10-CM

## 2018-12-21 DIAGNOSIS — Z76.82 LIVER TRANSPLANT CANDIDATE: ICD-10-CM

## 2018-12-21 PROCEDURE — 93010 ELECTROCARDIOGRAM REPORT: CPT | Performed by: INTERNAL MEDICINE

## 2018-12-21 PROCEDURE — 40000556 ZZH STATISTIC PERIPHERAL IV START W US GUIDANCE

## 2018-12-21 PROCEDURE — 40000141 ZZH STATISTIC PERIPHERAL IV START W/O US GUIDANCE

## 2018-12-21 PROCEDURE — 12000025 ZZH R&B TRANSPLANT INTERMEDIATE

## 2018-12-21 RX ORDER — PIPERACILLIN SODIUM, TAZOBACTAM SODIUM 3; .375 G/15ML; G/15ML
3.38 INJECTION, POWDER, LYOPHILIZED, FOR SOLUTION INTRAVENOUS ONCE
Status: COMPLETED | OUTPATIENT
Start: 2018-12-21 | End: 2018-12-22

## 2018-12-21 RX ORDER — PIPERACILLIN SODIUM, TAZOBACTAM SODIUM 2; .25 G/10ML; G/10ML
2.25 INJECTION, POWDER, LYOPHILIZED, FOR SOLUTION INTRAVENOUS
Status: DISCONTINUED | OUTPATIENT
Start: 2018-12-21 | End: 2018-12-22 | Stop reason: HOSPADM

## 2018-12-21 RX ORDER — NADOLOL 20 MG/1
20 TABLET ORAL DAILY
Status: DISCONTINUED | OUTPATIENT
Start: 2018-12-22 | End: 2018-12-22

## 2018-12-21 RX ORDER — RIFAMPIN 300 MG/1
600 CAPSULE ORAL DAILY
Status: DISCONTINUED | OUTPATIENT
Start: 2018-12-22 | End: 2018-12-22 | Stop reason: ALTCHOICE

## 2018-12-21 RX ORDER — ALFUZOSIN HYDROCHLORIDE 10 MG/1
10 TABLET, EXTENDED RELEASE ORAL DAILY
Status: DISCONTINUED | OUTPATIENT
Start: 2018-12-22 | End: 2018-12-22

## 2018-12-21 RX ORDER — LIDOCAINE 40 MG/G
CREAM TOPICAL
Status: DISCONTINUED | OUTPATIENT
Start: 2018-12-21 | End: 2018-12-22 | Stop reason: HOSPADM

## 2018-12-21 NOTE — LETTER
Transition Communication Hand-off for Care Transitions to Next Level of Care Provider    Name: Loy Limon  : 1955  MRN #: [id number]  Primary Care Provider: Jaswinder Anne     Primary Clinic: [address] [place] [address]  [facility] [address]     Reason for Hospitalization:  Liver transplant recipient (H) [Z94.4]  Admit Date/Time: 2018 10:50 PM  Discharge Date: 19  Payor Source: Payor: MEDICARE / Plan: MEDICARE / Product Type: Medicare /              Reason for Communication Hand-off Referral: Other /LT with right inguinal hernia repair--language barrier    Discharge Plan:  [facility] to visit  morning and every day for 1 week, ATC on Monday,  @ 0900       Concern for non-adherence with plan of care:   Y/N No--not intentionally, but maybe due to language barrier  Wife is only Yoruba speaking also and is also illiterate in Yoruba  Daughter: Kaylene speaks English bu works and has 4 kids--they do not live together  Discharge Needs Assessment:  Needs      Most Recent Value   Equipment Currently Used at Home  none   Home Care  [facility] [phone], Fax: [phone]            Follow-up specialty is recommended: Yes    Follow-up plan:    Future Appointments   Date Time Provider Department Center   2019 10:00 AM Cora Seymour, RADHA [facility]   2019 10:00 AM Joyce Modi [facility]   2019 10:30 AM Joyce Modi [facility]   2019 10:30 AM Denys Marr [facility]   2019  9:00 AM UC SPEC INFUSION [facility]   2019  9:30 AM Emil Echevarria MD [facility]   2019  1:00 PM [facility]   2019  2:30 PM Pauline Clancy MD [facility]   2019  9:00 AM UC LAB [facility]   2019 10:00 AM Tamela Carballo MD [facility]   3/1/2019 10:30 AM Khloe Torres MD [facility]       Any outstanding tests or procedures:        Referrals      Future Labs/Procedures    Home care nursing referral     Comments:    [facility]  Ph: [phone]  Fax: [phone]      RN skilled nursing visit--please see 1/8/19 morning to open case and go over medications    +++Please send +++++  ---pt will return to ATC clinic on Monday, 1/14/19 @ 0900       RN to assess vital signs and weight, respiratory and cardiac status, patients ability to take and record daily blood pressure, temp and weight, pain level and activity tolerance, incision for signs/symptoms of infection, hydration, nutrition and bowel status and home safety.    RN to teach medication management.  Pt is on new insulin   Assist / teach patient to obtain and record lab results in handbook     RN to provide morning lab draws, every Monday and Thursday and report results to Outpatient Care Coordinator: Jeny Szymanski  Ph: [phone]  Fax: [phone]    Home PT--eval and treat  Home OT--eval and treat/lymphedema        Your provider has ordered home care nursing services. If you have not been contacted within 2 days of your discharge please call the inpatient department phone number at [phone] .    Medication Therapy Management Referral     Comments:    MTM referral reason            Patient has Lactulose or Rifaxamin as a PTA Med or a Discharge medication         This service is designed to help you get the most from your medications.  A specially trained pharmacist will work closely with you and your doctors  to solve any problems related to your medications and to help you get the   best results from taking them.      The Medication Therapy Management staff will call you to schedule an appointment.          Supplies     Future Labs/Procedures    ALCOHOL WIPES PER BOX           Trejo Recommendations:      Nancy Ventura    AVS/Discharge Summary is the source of truth; this is a helpful guide for improved communication of patient story

## 2018-12-21 NOTE — LETTER
[address] [facility]  [address]  [facility] [address]  [phone]    2018    Re: Loy Limon  [address]   [facility] [id number] [phone] (home)     : 1955      To Whom It May Concern:      Loy Limon was hospitalized on 2018 due to surgery.  He will need to be off of work for at least 6 weeks. He may return to work when cleared at outpatient follow up with restrictions to be determined by outpatient surgeon at follow up.        Sincerely,        Racheal Sarabia, NP

## 2018-12-22 ENCOUNTER — RESULTS ONLY (OUTPATIENT)
Dept: OTHER | Facility: CLINIC | Age: 63
End: 2018-12-22

## 2018-12-22 ENCOUNTER — ANESTHESIA EVENT (OUTPATIENT)
Dept: SURGERY | Facility: CLINIC | Age: 63
DRG: 005 | End: 2018-12-22
Payer: MEDICARE

## 2018-12-22 ENCOUNTER — APPOINTMENT (OUTPATIENT)
Dept: GENERAL RADIOLOGY | Facility: CLINIC | Age: 63
DRG: 005 | End: 2018-12-22
Attending: TRANSPLANT SURGERY
Payer: MEDICARE

## 2018-12-22 ENCOUNTER — APPOINTMENT (OUTPATIENT)
Dept: GENERAL RADIOLOGY | Facility: CLINIC | Age: 63
DRG: 005 | End: 2018-12-22
Attending: SURGERY
Payer: MEDICARE

## 2018-12-22 ENCOUNTER — ANESTHESIA (OUTPATIENT)
Dept: SURGERY | Facility: CLINIC | Age: 63
DRG: 005 | End: 2018-12-22
Payer: MEDICARE

## 2018-12-22 ENCOUNTER — APPOINTMENT (OUTPATIENT)
Dept: GENERAL RADIOLOGY | Facility: CLINIC | Age: 63
DRG: 005 | End: 2018-12-22
Attending: STUDENT IN AN ORGANIZED HEALTH CARE EDUCATION/TRAINING PROGRAM
Payer: MEDICARE

## 2018-12-22 ENCOUNTER — APPOINTMENT (OUTPATIENT)
Dept: ULTRASOUND IMAGING | Facility: CLINIC | Age: 63
DRG: 005 | End: 2018-12-22
Attending: STUDENT IN AN ORGANIZED HEALTH CARE EDUCATION/TRAINING PROGRAM
Payer: MEDICARE

## 2018-12-22 PROBLEM — Z94.4 LIVER TRANSPLANT RECIPIENT (H): Status: ACTIVE | Noted: 2018-12-22

## 2018-12-22 LAB
ABO + RH BLD: NORMAL
ABO + RH BLD: NORMAL
ALBUMIN SERPL-MCNC: 1.7 G/DL (ref 3.4–5)
ALBUMIN SERPL-MCNC: 2.5 G/DL (ref 3.4–5)
ALBUMIN UR-MCNC: NEGATIVE MG/DL
ALP SERPL-CCNC: 203 U/L (ref 40–150)
ALP SERPL-CCNC: 408 U/L (ref 40–150)
ALT SERPL W P-5'-P-CCNC: 40 U/L (ref 0–70)
ALT SERPL W P-5'-P-CCNC: 518 U/L (ref 0–70)
AMYLASE SERPL-CCNC: 67 U/L (ref 30–110)
ANGLE RATE OF CLOT GROWTH: 64.3 DEG (ref 59–74)
ANGLE RATE OF CLOT STRENGTH: 65 DEGREES (ref 53–72)
ANION GAP SERPL CALCULATED.3IONS-SCNC: 5 MMOL/L (ref 3–14)
ANION GAP SERPL CALCULATED.3IONS-SCNC: 8 MMOL/L (ref 3–14)
APPEARANCE UR: CLEAR
APTT PPP: 38 SEC (ref 22–37)
APTT PPP: 40 SEC (ref 22–37)
AST SERPL W P-5'-P-CCNC: 48 U/L (ref 0–45)
AST SERPL W P-5'-P-CCNC: 658 U/L (ref 0–45)
BASE DEFICIT BLDA-SCNC: 1.2 MMOL/L
BASE DEFICIT BLDA-SCNC: 2.5 MMOL/L
BASE DEFICIT BLDA-SCNC: 2.8 MMOL/L
BASE DEFICIT BLDA-SCNC: 3.1 MMOL/L
BASE DEFICIT BLDA-SCNC: 4.1 MMOL/L
BASE DEFICIT BLDA-SCNC: 6.8 MMOL/L
BASE EXCESS BLDA CALC-SCNC: 0.4 MMOL/L
BASE EXCESS BLDA CALC-SCNC: 1.3 MMOL/L
BASE EXCESS BLDA CALC-SCNC: 2.7 MMOL/L
BASOPHILS # BLD AUTO: 0 10E9/L (ref 0–0.2)
BASOPHILS # BLD AUTO: 0 10E9/L (ref 0–0.2)
BASOPHILS NFR BLD AUTO: 0.1 %
BASOPHILS NFR BLD AUTO: 0.3 %
BILIRUB DIRECT SERPL-MCNC: 2 MG/DL (ref 0–0.2)
BILIRUB SERPL-MCNC: 1.7 MG/DL (ref 0.2–1.3)
BILIRUB SERPL-MCNC: 3 MG/DL (ref 0.2–1.3)
BILIRUB UR QL STRIP: NEGATIVE
BLD GP AB SCN SERPL QL: NORMAL
BLD PROD TYP BPU: NORMAL
BLD UNIT ID BPU: 0
BLOOD BANK CMNT PATIENT-IMP: NORMAL
BLOOD PRODUCT CODE: NORMAL
BPU ID: NORMAL
BUN SERPL-MCNC: 10 MG/DL (ref 7–30)
BUN SERPL-MCNC: 16 MG/DL (ref 7–30)
CA-I BLD-MCNC: 4 MG/DL (ref 4.4–5.2)
CA-I BLD-MCNC: 4.3 MG/DL (ref 4.4–5.2)
CA-I BLD-MCNC: 4.4 MG/DL (ref 4.4–5.2)
CA-I BLD-MCNC: 4.5 MG/DL (ref 4.4–5.2)
CA-I BLD-MCNC: 4.7 MG/DL (ref 4.4–5.2)
CA-I BLD-MCNC: 4.8 MG/DL (ref 4.4–5.2)
CA-I BLD-MCNC: 4.8 MG/DL (ref 4.4–5.2)
CA-I BLD-MCNC: 5 MG/DL (ref 4.4–5.2)
CALCIUM SERPL-MCNC: 7.4 MG/DL (ref 8.5–10.1)
CALCIUM SERPL-MCNC: 7.7 MG/DL (ref 8.5–10.1)
CHLORIDE SERPL-SCNC: 109 MMOL/L (ref 94–109)
CHLORIDE SERPL-SCNC: 109 MMOL/L (ref 94–109)
CI HYPERCOAGULATION INDEX: 0 RATIO (ref 0–3)
CI HYPOCOAGULATION INDEX: 0.5 RATIO (ref 0–3)
CLOT LYSIS 30M P MA LENFR BLD TEG: 1 % (ref 0–8)
CLOT STRENGTH BLD TEG: 7.2 KD/SC (ref 5.3–13.2)
CMV IGG SERPL QL IA: >8 AI (ref 0–0.8)
CMV IGM SERPL QL IA: <0.2 AI (ref 0–0.8)
CO2 SERPL-SCNC: 26 MMOL/L (ref 20–32)
CO2 SERPL-SCNC: 26 MMOL/L (ref 20–32)
COLOR UR AUTO: YELLOW
CREAT SERPL-MCNC: 0.5 MG/DL (ref 0.66–1.25)
CREAT SERPL-MCNC: 0.65 MG/DL (ref 0.66–1.25)
DIFFERENTIAL METHOD BLD: ABNORMAL
DIFFERENTIAL METHOD BLD: ABNORMAL
EBV VCA IGG SER QL IA: >8 AI (ref 0–0.8)
EBV VCA IGM SER QL IA: <0.2 AI (ref 0–0.8)
EOSINOPHIL # BLD AUTO: 0 10E9/L (ref 0–0.7)
EOSINOPHIL # BLD AUTO: 0.2 10E9/L (ref 0–0.7)
EOSINOPHIL NFR BLD AUTO: 0 %
EOSINOPHIL NFR BLD AUTO: 4.8 %
ERYTHROCYTE [DISTWIDTH] IN BLOOD BY AUTOMATED COUNT: 13.9 % (ref 10–15)
ERYTHROCYTE [DISTWIDTH] IN BLOOD BY AUTOMATED COUNT: 14.1 % (ref 10–15)
FIBRINOGEN PPP-MCNC: 198 MG/DL (ref 200–420)
FIBRINOGEN PPP-MCNC: 251 MG/DL (ref 200–420)
G ACTUAL CLOT STRENGTH: 6.2 KD/SC (ref 4.5–11)
GFR SERPL CREATININE-BSD FRML MDRD: >90 ML/MIN/{1.73_M2}
GFR SERPL CREATININE-BSD FRML MDRD: >90 ML/MIN/{1.73_M2}
GLUCOSE BLD-MCNC: 102 MG/DL (ref 70–99)
GLUCOSE BLD-MCNC: 114 MG/DL (ref 70–99)
GLUCOSE BLD-MCNC: 117 MG/DL (ref 70–99)
GLUCOSE BLD-MCNC: 172 MG/DL (ref 70–99)
GLUCOSE BLD-MCNC: 201 MG/DL (ref 70–99)
GLUCOSE BLD-MCNC: 204 MG/DL (ref 70–99)
GLUCOSE BLD-MCNC: 250 MG/DL (ref 70–99)
GLUCOSE BLD-MCNC: 256 MG/DL (ref 70–99)
GLUCOSE BLDC GLUCOMTR-MCNC: 149 MG/DL (ref 70–99)
GLUCOSE SERPL-MCNC: 157 MG/DL (ref 70–99)
GLUCOSE SERPL-MCNC: 167 MG/DL (ref 70–99)
GLUCOSE UR STRIP-MCNC: NEGATIVE MG/DL
GRAM STN SPEC: NORMAL
HBA1C MFR BLD: 5.8 % (ref 0–5.6)
HCO3 BLD-SCNC: 20 MMOL/L (ref 21–28)
HCO3 BLD-SCNC: 21 MMOL/L (ref 21–28)
HCO3 BLD-SCNC: 23 MMOL/L (ref 21–28)
HCO3 BLD-SCNC: 24 MMOL/L (ref 21–28)
HCO3 BLD-SCNC: 25 MMOL/L (ref 21–28)
HCO3 BLD-SCNC: 26 MMOL/L (ref 21–28)
HCO3 BLD-SCNC: 27 MMOL/L (ref 21–28)
HCT VFR BLD AUTO: 27.6 % (ref 40–53)
HCT VFR BLD AUTO: 36.4 % (ref 40–53)
HGB BLD-MCNC: 11.1 G/DL (ref 13.3–17.7)
HGB BLD-MCNC: 11.9 G/DL (ref 13.3–17.7)
HGB BLD-MCNC: 12 G/DL (ref 13.3–17.7)
HGB BLD-MCNC: 9.1 G/DL (ref 13.3–17.7)
HGB BLD-MCNC: 9.3 G/DL (ref 13.3–17.7)
HGB BLD-MCNC: 9.3 G/DL (ref 13.3–17.7)
HGB BLD-MCNC: 9.5 G/DL (ref 13.3–17.7)
HGB BLD-MCNC: 9.9 G/DL (ref 13.3–17.7)
HGB UR QL STRIP: ABNORMAL
IMM GRANULOCYTES # BLD: 0 10E9/L (ref 0–0.4)
IMM GRANULOCYTES # BLD: 0 10E9/L (ref 0–0.4)
IMM GRANULOCYTES NFR BLD: 0 %
IMM GRANULOCYTES NFR BLD: 0.1 %
INR PPP: 1.77 (ref 0.86–1.14)
INR PPP: 2.04 (ref 0.86–1.14)
K TIME TO SPEC CLOT STRENGTH: 1.8 MIN (ref 1–3)
K TIME TO SPEC CLOT STRENGTH: 1.8 MINUTE (ref 1–3)
KETONES UR STRIP-MCNC: NEGATIVE MG/DL
LACTATE BLD-SCNC: 1.4 MMOL/L (ref 0.7–2)
LACTATE BLD-SCNC: 2.8 MMOL/L (ref 0.7–2)
LACTATE BLD-SCNC: 3.2 MMOL/L (ref 0.7–2)
LACTATE BLD-SCNC: 4 MMOL/L (ref 0.7–2)
LACTATE BLD-SCNC: 4.1 MMOL/L (ref 0.7–2)
LACTATE BLD-SCNC: 5.1 MMOL/L (ref 0.7–2)
LEUKOCYTE ESTERASE UR QL STRIP: NEGATIVE
LY30 LYSIS AT 30 MINUTES: 1.1 % (ref 0–8)
LY60 LYSIS AT 60 MINUTES: 11.3 % (ref 0–15)
LY60 LYSIS AT 60 MINUTES: 4.6 % (ref 0–15)
LYMPHOCYTES # BLD AUTO: 0.5 10E9/L (ref 0.8–5.3)
LYMPHOCYTES # BLD AUTO: 1.1 10E9/L (ref 0.8–5.3)
LYMPHOCYTES NFR BLD AUTO: 35.7 %
LYMPHOCYTES NFR BLD AUTO: 6.3 %
MA MAXIMUM CLOT STRENGTH: 55.3 MM (ref 50–70)
MA MAXIMUM CLOT STRENGTH: 59.2 MM (ref 55–74)
MAGNESIUM SERPL-MCNC: 2 MG/DL (ref 1.6–2.3)
MCH RBC QN AUTO: 32.1 PG (ref 26.5–33)
MCH RBC QN AUTO: 32.4 PG (ref 26.5–33)
MCHC RBC AUTO-ENTMCNC: 33 G/DL (ref 31.5–36.5)
MCHC RBC AUTO-ENTMCNC: 33.7 G/DL (ref 31.5–36.5)
MCV RBC AUTO: 96 FL (ref 78–100)
MCV RBC AUTO: 97 FL (ref 78–100)
MONOCYTES # BLD AUTO: 0.3 10E9/L (ref 0–1.3)
MONOCYTES # BLD AUTO: 0.4 10E9/L (ref 0–1.3)
MONOCYTES NFR BLD AUTO: 4.9 %
MONOCYTES NFR BLD AUTO: 9.2 %
MRSA DNA SPEC QL NAA+PROBE: NEGATIVE
MUCOUS THREADS #/AREA URNS LPF: PRESENT /LPF
NEUTROPHILS # BLD AUTO: 1.6 10E9/L (ref 1.6–8.3)
NEUTROPHILS # BLD AUTO: 7.1 10E9/L (ref 1.6–8.3)
NEUTROPHILS NFR BLD AUTO: 50 %
NEUTROPHILS NFR BLD AUTO: 88.6 %
NITRATE UR QL: NEGATIVE
NRBC # BLD AUTO: 0 10*3/UL
NRBC # BLD AUTO: 0 10*3/UL
NRBC BLD AUTO-RTO: 0 /100
NRBC BLD AUTO-RTO: 0 /100
NUM BPU REQUESTED: 10
O2/TOTAL GAS SETTING VFR VENT: 40 %
O2/TOTAL GAS SETTING VFR VENT: 50 %
O2/TOTAL GAS SETTING VFR VENT: 72 %
O2/TOTAL GAS SETTING VFR VENT: 75 %
O2/TOTAL GAS SETTING VFR VENT: ABNORMAL %
O2/TOTAL GAS SETTING VFR VENT: ABNORMAL %
PCO2 BLD: 31 MM HG (ref 35–45)
PCO2 BLD: 32 MM HG (ref 35–45)
PCO2 BLD: 39 MM HG (ref 35–45)
PCO2 BLD: 39 MM HG (ref 35–45)
PCO2 BLD: 41 MM HG (ref 35–45)
PCO2 BLD: 42 MM HG (ref 35–45)
PCO2 BLD: 43 MM HG (ref 35–45)
PH BLD: 7.27 PH (ref 7.35–7.45)
PH BLD: 7.34 PH (ref 7.35–7.45)
PH BLD: 7.36 PH (ref 7.35–7.45)
PH BLD: 7.38 PH (ref 7.35–7.45)
PH BLD: 7.41 PH (ref 7.35–7.45)
PH BLD: 7.42 PH (ref 7.35–7.45)
PH BLD: 7.42 PH (ref 7.35–7.45)
PH BLD: 7.43 PH (ref 7.35–7.45)
PH BLD: 7.44 PH (ref 7.35–7.45)
PH UR STRIP: 6.5 PH (ref 5–7)
PHOSPHATE SERPL-MCNC: 2.8 MG/DL (ref 2.5–4.5)
PLATELET # BLD AUTO: 66 10E9/L (ref 150–450)
PLATELET # BLD AUTO: 75 10E9/L (ref 150–450)
PO2 BLD: 121 MM HG (ref 80–105)
PO2 BLD: 184 MM HG (ref 80–105)
PO2 BLD: 221 MM HG (ref 80–105)
PO2 BLD: 238 MM HG (ref 80–105)
PO2 BLD: 247 MM HG (ref 80–105)
PO2 BLD: 264 MM HG (ref 80–105)
PO2 BLD: 287 MM HG (ref 80–105)
PO2 BLD: 299 MM HG (ref 80–105)
PO2 BLD: 92 MM HG (ref 80–105)
POTASSIUM BLD-SCNC: 3.4 MMOL/L (ref 3.4–5.3)
POTASSIUM BLD-SCNC: 3.4 MMOL/L (ref 3.4–5.3)
POTASSIUM BLD-SCNC: 3.5 MMOL/L (ref 3.4–5.3)
POTASSIUM BLD-SCNC: 3.6 MMOL/L (ref 3.4–5.3)
POTASSIUM BLD-SCNC: 3.6 MMOL/L (ref 3.4–5.3)
POTASSIUM BLD-SCNC: 3.8 MMOL/L (ref 3.4–5.3)
POTASSIUM BLD-SCNC: 3.9 MMOL/L (ref 3.4–5.3)
POTASSIUM BLD-SCNC: 4 MMOL/L (ref 3.4–5.3)
POTASSIUM SERPL-SCNC: 3.7 MMOL/L (ref 3.4–5.3)
POTASSIUM SERPL-SCNC: 4.5 MMOL/L (ref 3.4–5.3)
PROT SERPL-MCNC: 4.5 G/DL (ref 6.8–8.8)
PROT SERPL-MCNC: 6.5 G/DL (ref 6.8–8.8)
R TIME UNTIL CLOT FORMS: 5.7 MIN (ref 4–9)
R TIME UNTIL CLOT FORMS: 5.8 MINUTE (ref 5–10)
RBC # BLD AUTO: 2.87 10E12/L (ref 4.4–5.9)
RBC # BLD AUTO: 3.74 10E12/L (ref 4.4–5.9)
RBC #/AREA URNS AUTO: 3 /HPF (ref 0–2)
SODIUM BLD-SCNC: 136 MMOL/L (ref 133–144)
SODIUM BLD-SCNC: 137 MMOL/L (ref 133–144)
SODIUM BLD-SCNC: 138 MMOL/L (ref 133–144)
SODIUM BLD-SCNC: 138 MMOL/L (ref 133–144)
SODIUM BLD-SCNC: 139 MMOL/L (ref 133–144)
SODIUM SERPL-SCNC: 139 MMOL/L (ref 133–144)
SODIUM SERPL-SCNC: 143 MMOL/L (ref 133–144)
SOURCE: ABNORMAL
SP GR UR STRIP: 1.01 (ref 1–1.03)
SPECIMEN EXP DATE BLD: NORMAL
SPECIMEN SOURCE: NORMAL
SPECIMEN SOURCE: NORMAL
TRANSFUSION STATUS PATIENT QL: NORMAL
UROBILINOGEN UR STRIP-MCNC: 4 MG/DL (ref 0–2)
WBC # BLD AUTO: 3.1 10E9/L (ref 4–11)
WBC # BLD AUTO: 8 10E9/L (ref 4–11)
WBC #/AREA URNS AUTO: 1 /HPF (ref 0–5)

## 2018-12-22 PROCEDURE — 80048 BASIC METABOLIC PNL TOTAL CA: CPT | Performed by: SURGERY

## 2018-12-22 PROCEDURE — 86833 HLA CLASS II HIGH DEFIN QUAL: CPT | Performed by: TRANSPLANT SURGERY

## 2018-12-22 PROCEDURE — 25000128 H RX IP 250 OP 636: Performed by: STUDENT IN AN ORGANIZED HEALTH CARE EDUCATION/TRAINING PROGRAM

## 2018-12-22 PROCEDURE — 25000125 ZZHC RX 250: Performed by: SURGERY

## 2018-12-22 PROCEDURE — 83605 ASSAY OF LACTIC ACID: CPT | Performed by: SURGERY

## 2018-12-22 PROCEDURE — A9270 NON-COVERED ITEM OR SERVICE: HCPCS | Mod: GY | Performed by: STUDENT IN AN ORGANIZED HEALTH CARE EDUCATION/TRAINING PROGRAM

## 2018-12-22 PROCEDURE — 27210447 ZZH PACK CELL SAVER CSP: Performed by: TRANSPLANT SURGERY

## 2018-12-22 PROCEDURE — 25000128 H RX IP 250 OP 636: Performed by: ANESTHESIOLOGY

## 2018-12-22 PROCEDURE — 6ABF0BZ PERFUSION OF HEPATOBILIARY SYSTEM AND PANCREAS DONOR ORGAN, SINGLE: ICD-10-PCS | Performed by: TRANSPLANT SURGERY

## 2018-12-22 PROCEDURE — 82947 ASSAY GLUCOSE BLOOD QUANT: CPT | Performed by: TRANSPLANT SURGERY

## 2018-12-22 PROCEDURE — 25000125 ZZHC RX 250: Performed by: STUDENT IN AN ORGANIZED HEALTH CARE EDUCATION/TRAINING PROGRAM

## 2018-12-22 PROCEDURE — 80053 COMPREHEN METABOLIC PANEL: CPT | Performed by: STUDENT IN AN ORGANIZED HEALTH CARE EDUCATION/TRAINING PROGRAM

## 2018-12-22 PROCEDURE — 87070 CULTURE OTHR SPECIMN AEROBIC: CPT | Performed by: TRANSPLANT SURGERY

## 2018-12-22 PROCEDURE — 84295 ASSAY OF SERUM SODIUM: CPT | Performed by: TRANSPLANT SURGERY

## 2018-12-22 PROCEDURE — 87102 FUNGUS ISOLATION CULTURE: CPT | Performed by: TRANSPLANT SURGERY

## 2018-12-22 PROCEDURE — 83735 ASSAY OF MAGNESIUM: CPT | Performed by: STUDENT IN AN ORGANIZED HEALTH CARE EDUCATION/TRAINING PROGRAM

## 2018-12-22 PROCEDURE — 86665 EPSTEIN-BARR CAPSID VCA: CPT | Performed by: STUDENT IN AN ORGANIZED HEALTH CARE EDUCATION/TRAINING PROGRAM

## 2018-12-22 PROCEDURE — 81200005 ZZH ACQUISITION LIVER CADAVER DONOR

## 2018-12-22 PROCEDURE — 86825 HLA X-MATH NON-CYTOTOXIC: CPT | Performed by: TRANSPLANT SURGERY

## 2018-12-22 PROCEDURE — 81001 URINALYSIS AUTO W/SCOPE: CPT | Performed by: STUDENT IN AN ORGANIZED HEALTH CARE EDUCATION/TRAINING PROGRAM

## 2018-12-22 PROCEDURE — 00000146 ZZHCL STATISTIC GLUCOSE BY METER IP

## 2018-12-22 PROCEDURE — 85730 THROMBOPLASTIN TIME PARTIAL: CPT | Performed by: SURGERY

## 2018-12-22 PROCEDURE — 25000131 ZZH RX MED GY IP 250 OP 636 PS 637: Mod: GY | Performed by: TRANSPLANT SURGERY

## 2018-12-22 PROCEDURE — 41000018 ZZH PER-PERFUSION 1ST 30 MIN: Performed by: TRANSPLANT SURGERY

## 2018-12-22 PROCEDURE — 25000131 ZZH RX MED GY IP 250 OP 636 PS 637: Mod: GY | Performed by: SURGERY

## 2018-12-22 PROCEDURE — 82150 ASSAY OF AMYLASE: CPT | Performed by: STUDENT IN AN ORGANIZED HEALTH CARE EDUCATION/TRAINING PROGRAM

## 2018-12-22 PROCEDURE — 86900 BLOOD TYPING SEROLOGIC ABO: CPT | Performed by: STUDENT IN AN ORGANIZED HEALTH CARE EDUCATION/TRAINING PROGRAM

## 2018-12-22 PROCEDURE — 87389 HIV-1 AG W/HIV-1&-2 AB AG IA: CPT | Performed by: STUDENT IN AN ORGANIZED HEALTH CARE EDUCATION/TRAINING PROGRAM

## 2018-12-22 PROCEDURE — 80076 HEPATIC FUNCTION PANEL: CPT | Performed by: SURGERY

## 2018-12-22 PROCEDURE — 40000048 ZZH STATISTIC DAILY SWAN MONITORING

## 2018-12-22 PROCEDURE — 85730 THROMBOPLASTIN TIME PARTIAL: CPT | Performed by: STUDENT IN AN ORGANIZED HEALTH CARE EDUCATION/TRAINING PROGRAM

## 2018-12-22 PROCEDURE — 36415 COLL VENOUS BLD VENIPUNCTURE: CPT | Performed by: STUDENT IN AN ORGANIZED HEALTH CARE EDUCATION/TRAINING PROGRAM

## 2018-12-22 PROCEDURE — 25000128 H RX IP 250 OP 636: Performed by: SURGERY

## 2018-12-22 PROCEDURE — 85610 PROTHROMBIN TIME: CPT | Performed by: STUDENT IN AN ORGANIZED HEALTH CARE EDUCATION/TRAINING PROGRAM

## 2018-12-22 PROCEDURE — 87081 CULTURE SCREEN ONLY: CPT | Performed by: STUDENT IN AN ORGANIZED HEALTH CARE EDUCATION/TRAINING PROGRAM

## 2018-12-22 PROCEDURE — 86923 COMPATIBILITY TEST ELECTRIC: CPT | Performed by: STUDENT IN AN ORGANIZED HEALTH CARE EDUCATION/TRAINING PROGRAM

## 2018-12-22 PROCEDURE — 82803 BLOOD GASES ANY COMBINATION: CPT | Performed by: TRANSPLANT SURGERY

## 2018-12-22 PROCEDURE — 0YU50JZ SUPPLEMENT RIGHT INGUINAL REGION WITH SYNTHETIC SUBSTITUTE, OPEN APPROACH: ICD-10-PCS | Performed by: TRANSPLANT SURGERY

## 2018-12-22 PROCEDURE — 81376 HLA II TYPING 1 LOCUS LR: CPT | Performed by: TRANSPLANT SURGERY

## 2018-12-22 PROCEDURE — 25800025 ZZH RX 258: Performed by: SURGERY

## 2018-12-22 PROCEDURE — 06C40ZZ EXTIRPATION OF MATTER FROM HEPATIC VEIN, OPEN APPROACH: ICD-10-PCS | Performed by: TRANSPLANT SURGERY

## 2018-12-22 PROCEDURE — 93975 VASCULAR STUDY: CPT | Mod: TC

## 2018-12-22 PROCEDURE — 41000019 ZZH PERA-PERFUSION EACH ADDTL 15 MIN: Performed by: TRANSPLANT SURGERY

## 2018-12-22 PROCEDURE — 83605 ASSAY OF LACTIC ACID: CPT | Performed by: TRANSPLANT SURGERY

## 2018-12-22 PROCEDURE — 25000125 ZZHC RX 250: Performed by: ANESTHESIOLOGY

## 2018-12-22 PROCEDURE — 0FY00Z0 TRANSPLANTATION OF LIVER, ALLOGENEIC, OPEN APPROACH: ICD-10-PCS | Performed by: TRANSPLANT SURGERY

## 2018-12-22 PROCEDURE — 37000009 ZZH ANESTHESIA TECHNICAL FEE, EACH ADDTL 15 MIN: Performed by: TRANSPLANT SURGERY

## 2018-12-22 PROCEDURE — 86644 CMV ANTIBODY: CPT | Performed by: STUDENT IN AN ORGANIZED HEALTH CARE EDUCATION/TRAINING PROGRAM

## 2018-12-22 PROCEDURE — C1781 MESH (IMPLANTABLE): HCPCS | Performed by: TRANSPLANT SURGERY

## 2018-12-22 PROCEDURE — 94003 VENT MGMT INPAT SUBQ DAY: CPT | Performed by: INTERNAL MEDICINE

## 2018-12-22 PROCEDURE — 86645 CMV ANTIBODY IGM: CPT | Performed by: STUDENT IN AN ORGANIZED HEALTH CARE EDUCATION/TRAINING PROGRAM

## 2018-12-22 PROCEDURE — P9059 PLASMA, FRZ BETWEEN 8-24HOUR: HCPCS | Performed by: TRANSPLANT SURGERY

## 2018-12-22 PROCEDURE — 40000275 ZZH STATISTIC RCP TIME EA 10 MIN

## 2018-12-22 PROCEDURE — 81370 HLA I & II TYPING LR: CPT | Performed by: TRANSPLANT SURGERY

## 2018-12-22 PROCEDURE — 87075 CULTR BACTERIA EXCEPT BLOOD: CPT | Performed by: TRANSPLANT SURGERY

## 2018-12-22 PROCEDURE — 88313 SPECIAL STAINS GROUP 2: CPT | Performed by: TRANSPLANT SURGERY

## 2018-12-22 PROCEDURE — 25000132 ZZH RX MED GY IP 250 OP 250 PS 637: Mod: GY | Performed by: STUDENT IN AN ORGANIZED HEALTH CARE EDUCATION/TRAINING PROGRAM

## 2018-12-22 PROCEDURE — 40000344 ZZHCL STATISTIC THAWING COMPONENT: Performed by: TRANSPLANT SURGERY

## 2018-12-22 PROCEDURE — 82330 ASSAY OF CALCIUM: CPT | Performed by: TRANSPLANT SURGERY

## 2018-12-22 PROCEDURE — 84100 ASSAY OF PHOSPHORUS: CPT | Performed by: STUDENT IN AN ORGANIZED HEALTH CARE EDUCATION/TRAINING PROGRAM

## 2018-12-22 PROCEDURE — 25000125 ZZHC RX 250: Performed by: TRANSPLANT SURGERY

## 2018-12-22 PROCEDURE — 85384 FIBRINOGEN ACTIVITY: CPT | Performed by: SURGERY

## 2018-12-22 PROCEDURE — 27210448 ZZH PACK RI-RAPID INFUSION: Performed by: TRANSPLANT SURGERY

## 2018-12-22 PROCEDURE — 37000008 ZZH ANESTHESIA TECHNICAL FEE, 1ST 30 MIN: Performed by: TRANSPLANT SURGERY

## 2018-12-22 PROCEDURE — 20000004 ZZH R&B ICU UMMC

## 2018-12-22 PROCEDURE — 36000068 ZZH SURGERY LEVEL 5 1ST 30 MIN - UMMC: Performed by: TRANSPLANT SURGERY

## 2018-12-22 PROCEDURE — 71046 X-RAY EXAM CHEST 2 VIEWS: CPT

## 2018-12-22 PROCEDURE — 85396 CLOTTING ASSAY WHOLE BLOOD: CPT | Performed by: TRANSPLANT SURGERY

## 2018-12-22 PROCEDURE — 25000128 H RX IP 250 OP 636

## 2018-12-22 PROCEDURE — 40000986 XR ABDOMEN PORT 1 VW

## 2018-12-22 PROCEDURE — 87205 SMEAR GRAM STAIN: CPT | Performed by: TRANSPLANT SURGERY

## 2018-12-22 PROCEDURE — 93005 ELECTROCARDIOGRAM TRACING: CPT

## 2018-12-22 PROCEDURE — 25000128 H RX IP 250 OP 636: Performed by: TRANSPLANT SURGERY

## 2018-12-22 PROCEDURE — 86901 BLOOD TYPING SEROLOGIC RH(D): CPT | Performed by: STUDENT IN AN ORGANIZED HEALTH CARE EDUCATION/TRAINING PROGRAM

## 2018-12-22 PROCEDURE — 88342 IMHCHEM/IMCYTCHM 1ST ANTB: CPT | Performed by: TRANSPLANT SURGERY

## 2018-12-22 PROCEDURE — 85025 COMPLETE CBC W/AUTO DIFF WBC: CPT | Performed by: STUDENT IN AN ORGANIZED HEALTH CARE EDUCATION/TRAINING PROGRAM

## 2018-12-22 PROCEDURE — 25000566 ZZH SEVOFLURANE, EA 15 MIN: Performed by: TRANSPLANT SURGERY

## 2018-12-22 PROCEDURE — 87640 STAPH A DNA AMP PROBE: CPT | Performed by: SURGERY

## 2018-12-22 PROCEDURE — 25800025 ZZH RX 258: Performed by: STUDENT IN AN ORGANIZED HEALTH CARE EDUCATION/TRAINING PROGRAM

## 2018-12-22 PROCEDURE — 88302 TISSUE EXAM BY PATHOLOGIST: CPT | Performed by: TRANSPLANT SURGERY

## 2018-12-22 PROCEDURE — 40000196 ZZH STATISTIC RAPCV CVP MONITORING

## 2018-12-22 PROCEDURE — 40000014 ZZH STATISTIC ARTERIAL MONITORING DAILY

## 2018-12-22 PROCEDURE — 86832 HLA CLASS I HIGH DEFIN QUAL: CPT | Performed by: TRANSPLANT SURGERY

## 2018-12-22 PROCEDURE — 82803 BLOOD GASES ANY COMBINATION: CPT | Performed by: SURGERY

## 2018-12-22 PROCEDURE — 25000565 ZZH ISOFLURANE, EA 15 MIN: Performed by: TRANSPLANT SURGERY

## 2018-12-22 PROCEDURE — 36415 COLL VENOUS BLD VENIPUNCTURE: CPT | Performed by: ANESTHESIOLOGY

## 2018-12-22 PROCEDURE — 85610 PROTHROMBIN TIME: CPT | Performed by: SURGERY

## 2018-12-22 PROCEDURE — 88304 TISSUE EXAM BY PATHOLOGIST: CPT | Performed by: TRANSPLANT SURGERY

## 2018-12-22 PROCEDURE — 27210794 ZZH OR GENERAL SUPPLY STERILE: Performed by: TRANSPLANT SURGERY

## 2018-12-22 PROCEDURE — 86850 RBC ANTIBODY SCREEN: CPT | Performed by: STUDENT IN AN ORGANIZED HEALTH CARE EDUCATION/TRAINING PROGRAM

## 2018-12-22 PROCEDURE — 85025 COMPLETE CBC W/AUTO DIFF WBC: CPT | Performed by: SURGERY

## 2018-12-22 PROCEDURE — G0472 HEP C SCREEN HIGH RISK/OTHER: HCPCS | Performed by: STUDENT IN AN ORGANIZED HEALTH CARE EDUCATION/TRAINING PROGRAM

## 2018-12-22 PROCEDURE — 36000070 ZZH SURGERY LEVEL 5 EA 15 ADDTL MIN - UMMC: Performed by: TRANSPLANT SURGERY

## 2018-12-22 PROCEDURE — 83036 HEMOGLOBIN GLYCOSYLATED A1C: CPT | Performed by: SURGERY

## 2018-12-22 PROCEDURE — 86704 HEP B CORE ANTIBODY TOTAL: CPT | Performed by: STUDENT IN AN ORGANIZED HEALTH CARE EDUCATION/TRAINING PROGRAM

## 2018-12-22 PROCEDURE — 86706 HEP B SURFACE ANTIBODY: CPT | Performed by: STUDENT IN AN ORGANIZED HEALTH CARE EDUCATION/TRAINING PROGRAM

## 2018-12-22 PROCEDURE — 87641 MR-STAPH DNA AMP PROBE: CPT | Performed by: SURGERY

## 2018-12-22 PROCEDURE — 88309 TISSUE EXAM BY PATHOLOGIST: CPT | Performed by: TRANSPLANT SURGERY

## 2018-12-22 PROCEDURE — P9016 RBC LEUKOCYTES REDUCED: HCPCS | Performed by: STUDENT IN AN ORGANIZED HEALTH CARE EDUCATION/TRAINING PROGRAM

## 2018-12-22 PROCEDURE — 85384 FIBRINOGEN ACTIVITY: CPT | Performed by: STUDENT IN AN ORGANIZED HEALTH CARE EDUCATION/TRAINING PROGRAM

## 2018-12-22 PROCEDURE — 40000986 XR CHEST PORT 1 VW

## 2018-12-22 PROCEDURE — 84132 ASSAY OF SERUM POTASSIUM: CPT | Performed by: TRANSPLANT SURGERY

## 2018-12-22 PROCEDURE — 85396 CLOTTING ASSAY WHOLE BLOOD: CPT | Performed by: ANESTHESIOLOGY

## 2018-12-22 DEVICE — IMPLANTABLE DEVICE: Type: IMPLANTABLE DEVICE | Site: INGUINAL | Status: FUNCTIONAL

## 2018-12-22 RX ORDER — ATROPINE SULFATE 0.1 MG/ML
INJECTION INTRAVENOUS PRN
Status: DISCONTINUED | OUTPATIENT
Start: 2018-12-22 | End: 2018-12-22

## 2018-12-22 RX ORDER — VALGANCICLOVIR HYDROCHLORIDE 50 MG/ML
450 POWDER, FOR SOLUTION ORAL DAILY
Status: DISCONTINUED | OUTPATIENT
Start: 2018-12-23 | End: 2018-12-26 | Stop reason: CLARIF

## 2018-12-22 RX ORDER — SODIUM CHLORIDE, SODIUM GLUCONATE, SODIUM ACETATE, POTASSIUM CHLORIDE AND MAGNESIUM CHLORIDE 526; 502; 368; 37; 30 MG/100ML; MG/100ML; MG/100ML; MG/100ML; MG/100ML
INJECTION, SOLUTION INTRAVENOUS CONTINUOUS PRN
Status: DISCONTINUED | OUTPATIENT
Start: 2018-12-22 | End: 2018-12-22

## 2018-12-22 RX ORDER — PIPERACILLIN SODIUM, TAZOBACTAM SODIUM 3; .375 G/15ML; G/15ML
3.38 INJECTION, POWDER, LYOPHILIZED, FOR SOLUTION INTRAVENOUS EVERY 6 HOURS
Status: COMPLETED | OUTPATIENT
Start: 2018-12-23 | End: 2018-12-24

## 2018-12-22 RX ORDER — VASOPRESSIN 20 U/ML
INJECTION PARENTERAL PRN
Status: DISCONTINUED | OUTPATIENT
Start: 2018-12-22 | End: 2018-12-22

## 2018-12-22 RX ORDER — ALBUTEROL SULFATE 90 UG/1
6 AEROSOL, METERED RESPIRATORY (INHALATION) EVERY 4 HOURS PRN
Status: DISCONTINUED | OUTPATIENT
Start: 2018-12-22 | End: 2018-12-28

## 2018-12-22 RX ORDER — GLYCOPYRROLATE 0.2 MG/ML
INJECTION, SOLUTION INTRAMUSCULAR; INTRAVENOUS PRN
Status: DISCONTINUED | OUTPATIENT
Start: 2018-12-22 | End: 2018-12-22

## 2018-12-22 RX ORDER — FLUCONAZOLE 2 MG/ML
200 INJECTION, SOLUTION INTRAVENOUS EVERY 24 HOURS
Status: DISCONTINUED | OUTPATIENT
Start: 2018-12-22 | End: 2018-12-29

## 2018-12-22 RX ORDER — FUROSEMIDE 10 MG/ML
INJECTION INTRAMUSCULAR; INTRAVENOUS PRN
Status: DISCONTINUED | OUTPATIENT
Start: 2018-12-22 | End: 2018-12-22

## 2018-12-22 RX ORDER — FLUCONAZOLE 2 MG/ML
200 INJECTION, SOLUTION INTRAVENOUS ONCE
Status: COMPLETED | OUTPATIENT
Start: 2018-12-22 | End: 2018-12-22

## 2018-12-22 RX ORDER — NICOTINE POLACRILEX 4 MG
15-30 LOZENGE BUCCAL
Status: DISCONTINUED | OUTPATIENT
Start: 2018-12-22 | End: 2018-12-24

## 2018-12-22 RX ORDER — NICOTINE POLACRILEX 4 MG
15-30 LOZENGE BUCCAL
Status: DISCONTINUED | OUTPATIENT
Start: 2018-12-22 | End: 2018-12-22 | Stop reason: HOSPADM

## 2018-12-22 RX ORDER — VANCOMYCIN HYDROCHLORIDE 1 G/200ML
1000 INJECTION, SOLUTION INTRAVENOUS ONCE
Status: COMPLETED | OUTPATIENT
Start: 2018-12-22 | End: 2018-12-22

## 2018-12-22 RX ORDER — PREDNISONE 10 MG/1
10 TABLET ORAL ONCE
Status: COMPLETED | OUTPATIENT
Start: 2018-12-27 | End: 2018-12-27

## 2018-12-22 RX ORDER — SODIUM CHLORIDE 9 MG/ML
INJECTION, SOLUTION INTRAVENOUS
Status: DISCONTINUED
Start: 2018-12-22 | End: 2018-12-23 | Stop reason: HOSPADM

## 2018-12-22 RX ORDER — PAPAVERINE HYDROCHLORIDE 30 MG/ML
INJECTION INTRAMUSCULAR; INTRAVENOUS PRN
Status: DISCONTINUED | OUTPATIENT
Start: 2018-12-22 | End: 2018-12-22 | Stop reason: HOSPADM

## 2018-12-22 RX ORDER — FENTANYL CITRATE 50 UG/ML
INJECTION, SOLUTION INTRAMUSCULAR; INTRAVENOUS PRN
Status: DISCONTINUED | OUTPATIENT
Start: 2018-12-22 | End: 2018-12-22

## 2018-12-22 RX ORDER — CALCIUM CHLORIDE 100 MG/ML
INJECTION INTRAVENOUS; INTRAVENTRICULAR PRN
Status: DISCONTINUED | OUTPATIENT
Start: 2018-12-22 | End: 2018-12-22

## 2018-12-22 RX ORDER — NITROGLYCERIN 20 MG/100ML
INJECTION INTRAVENOUS CONTINUOUS PRN
Status: DISCONTINUED | OUTPATIENT
Start: 2018-12-22 | End: 2018-12-22

## 2018-12-22 RX ORDER — MYCOPHENOLATE MOFETIL 250 MG/1
1000 CAPSULE ORAL
Status: DISCONTINUED | OUTPATIENT
Start: 2018-12-22 | End: 2019-01-07 | Stop reason: HOSPADM

## 2018-12-22 RX ORDER — METHYLPREDNISOLONE SODIUM SUCCINATE 125 MG/2ML
100 INJECTION, POWDER, LYOPHILIZED, FOR SOLUTION INTRAMUSCULAR; INTRAVENOUS ONCE
Status: COMPLETED | OUTPATIENT
Start: 2018-12-24 | End: 2018-12-24

## 2018-12-22 RX ORDER — PROPOFOL 10 MG/ML
10-20 INJECTION, EMULSION INTRAVENOUS EVERY 30 MIN PRN
Status: DISCONTINUED | OUTPATIENT
Start: 2018-12-22 | End: 2018-12-23

## 2018-12-22 RX ORDER — TACROLIMUS 0.5 MG/1
2 CAPSULE, GELATIN COATED ORAL
Status: DISCONTINUED | OUTPATIENT
Start: 2018-12-22 | End: 2018-12-24

## 2018-12-22 RX ORDER — PROPOFOL 10 MG/ML
5-75 INJECTION, EMULSION INTRAVENOUS CONTINUOUS
Status: DISCONTINUED | OUTPATIENT
Start: 2018-12-22 | End: 2018-12-23

## 2018-12-22 RX ORDER — SODIUM CHLORIDE, SODIUM LACTATE, POTASSIUM CHLORIDE, CALCIUM CHLORIDE 600; 310; 30; 20 MG/100ML; MG/100ML; MG/100ML; MG/100ML
INJECTION, SOLUTION INTRAVENOUS
Status: COMPLETED
Start: 2018-12-22 | End: 2018-12-22

## 2018-12-22 RX ORDER — DEXTROSE MONOHYDRATE 25 G/50ML
25-50 INJECTION, SOLUTION INTRAVENOUS
Status: DISCONTINUED | OUTPATIENT
Start: 2018-12-22 | End: 2018-12-22 | Stop reason: HOSPADM

## 2018-12-22 RX ORDER — METHYLPREDNISOLONE SODIUM SUCCINATE 125 MG/2ML
50 INJECTION, POWDER, LYOPHILIZED, FOR SOLUTION INTRAMUSCULAR; INTRAVENOUS ONCE
Status: COMPLETED | OUTPATIENT
Start: 2018-12-25 | End: 2018-12-25

## 2018-12-22 RX ORDER — PROPOFOL 10 MG/ML
INJECTION, EMULSION INTRAVENOUS PRN
Status: DISCONTINUED | OUTPATIENT
Start: 2018-12-22 | End: 2018-12-22

## 2018-12-22 RX ORDER — SODIUM BICARBONATE 42 MG/ML
INJECTION, SOLUTION INTRAVENOUS PRN
Status: DISCONTINUED | OUTPATIENT
Start: 2018-12-22 | End: 2018-12-22

## 2018-12-22 RX ORDER — DEXTROSE MONOHYDRATE 25 G/50ML
25-50 INJECTION, SOLUTION INTRAVENOUS
Status: DISCONTINUED | OUTPATIENT
Start: 2018-12-22 | End: 2018-12-24

## 2018-12-22 RX ORDER — MYCOPHENOLATE MOFETIL 200 MG/ML
1000 POWDER, FOR SUSPENSION ORAL
Status: DISCONTINUED | OUTPATIENT
Start: 2018-12-22 | End: 2018-12-24

## 2018-12-22 RX ORDER — NITROGLYCERIN 20 MG/100ML
INJECTION INTRAVENOUS PRN
Status: DISCONTINUED | OUTPATIENT
Start: 2018-12-22 | End: 2018-12-22

## 2018-12-22 RX ORDER — SODIUM CHLORIDE, SODIUM LACTATE, POTASSIUM CHLORIDE, CALCIUM CHLORIDE 600; 310; 30; 20 MG/100ML; MG/100ML; MG/100ML; MG/100ML
INJECTION, SOLUTION INTRAVENOUS CONTINUOUS
Status: DISCONTINUED | OUTPATIENT
Start: 2018-12-22 | End: 2018-12-27

## 2018-12-22 RX ORDER — FENTANYL CITRATE 50 UG/ML
25-50 INJECTION, SOLUTION INTRAMUSCULAR; INTRAVENOUS
Status: DISCONTINUED | OUTPATIENT
Start: 2018-12-22 | End: 2018-12-23

## 2018-12-22 RX ORDER — SULFAMETHOXAZOLE AND TRIMETHOPRIM 400; 80 MG/1; MG/1
1 TABLET ORAL DAILY
Status: DISCONTINUED | OUTPATIENT
Start: 2018-12-23 | End: 2019-01-07 | Stop reason: HOSPADM

## 2018-12-22 RX ORDER — VALGANCICLOVIR 450 MG/1
450 TABLET, FILM COATED ORAL DAILY
Status: DISCONTINUED | OUTPATIENT
Start: 2018-12-23 | End: 2019-01-07 | Stop reason: HOSPADM

## 2018-12-22 RX ORDER — NALOXONE HYDROCHLORIDE 0.4 MG/ML
.1-.4 INJECTION, SOLUTION INTRAMUSCULAR; INTRAVENOUS; SUBCUTANEOUS
Status: DISCONTINUED | OUTPATIENT
Start: 2018-12-22 | End: 2018-12-29

## 2018-12-22 RX ORDER — PROPOFOL 10 MG/ML
INJECTION, EMULSION INTRAVENOUS CONTINUOUS PRN
Status: DISCONTINUED | OUTPATIENT
Start: 2018-12-22 | End: 2018-12-22

## 2018-12-22 RX ADMIN — EPINEPHRINE 5 MCG: 1 INJECTION PARENTERAL at 12:12

## 2018-12-22 RX ADMIN — NITROGLYCERIN 20 MCG: 20 INJECTION INTRAVENOUS at 14:30

## 2018-12-22 RX ADMIN — SODIUM BICARBONATE 30 MEQ: 84 INJECTION, SOLUTION INTRAVENOUS at 16:37

## 2018-12-22 RX ADMIN — Medication 50 MCG/HR: at 23:02

## 2018-12-22 RX ADMIN — FENTANYL CITRATE 100 MCG: 50 INJECTION, SOLUTION INTRAMUSCULAR; INTRAVENOUS at 11:47

## 2018-12-22 RX ADMIN — NOREPINEPHRINE BITARTRATE 0.03 MCG/KG/MIN: 1 INJECTION INTRAVENOUS at 13:51

## 2018-12-22 RX ADMIN — ROCURONIUM BROMIDE 100 MG: 10 INJECTION INTRAVENOUS at 10:02

## 2018-12-22 RX ADMIN — PIPERACILLIN SODIUM,TAZOBACTAM SODIUM 3.38 G: 3; .375 INJECTION, POWDER, FOR SOLUTION INTRAVENOUS at 11:55

## 2018-12-22 RX ADMIN — PIPERACILLIN SODIUM,TAZOBACTAM SODIUM 2.25 G: 3; .375 INJECTION, POWDER, FOR SOLUTION INTRAVENOUS at 13:57

## 2018-12-22 RX ADMIN — HUMAN INSULIN 5 UNITS/HR: 100 INJECTION, SOLUTION SUBCUTANEOUS at 21:52

## 2018-12-22 RX ADMIN — PROPOFOL 40 MCG/KG/MIN: 10 INJECTION, EMULSION INTRAVENOUS at 20:15

## 2018-12-22 RX ADMIN — RIFAMPIN 600 MG: 300 CAPSULE ORAL at 08:14

## 2018-12-22 RX ADMIN — NITROGLYCERIN 20 MCG: 20 INJECTION INTRAVENOUS at 14:33

## 2018-12-22 RX ADMIN — VASOPRESSIN 2 UNITS/HR: 20 INJECTION INTRAVENOUS at 12:20

## 2018-12-22 RX ADMIN — HYDROMORPHONE HYDROCHLORIDE 0.5 MG: 1 INJECTION, SOLUTION INTRAMUSCULAR; INTRAVENOUS; SUBCUTANEOUS at 13:01

## 2018-12-22 RX ADMIN — SODIUM BICARBONATE 25 MEQ: 42 INJECTION, SOLUTION INTRAVENOUS at 16:24

## 2018-12-22 RX ADMIN — CALCIUM CHLORIDE 0.5 MG: 100 INJECTION, SOLUTION INTRAVENOUS at 19:41

## 2018-12-22 RX ADMIN — VASOPRESSIN 1 UNITS: 20 INJECTION INTRAVENOUS at 15:26

## 2018-12-22 RX ADMIN — CALCIUM CHLORIDE 0.5 G: 100 INJECTION, SOLUTION INTRAVENOUS at 14:25

## 2018-12-22 RX ADMIN — NOREPINEPHRINE BITARTRATE 6.4 MCG: 1 INJECTION INTRAVENOUS at 15:45

## 2018-12-22 RX ADMIN — RIFAXIMIN 550 MG: 550 TABLET ORAL at 08:14

## 2018-12-22 RX ADMIN — PHENYLEPHRINE HYDROCHLORIDE 200 MCG: 10 INJECTION, SOLUTION INTRAMUSCULAR; INTRAVENOUS; SUBCUTANEOUS at 16:26

## 2018-12-22 RX ADMIN — FUROSEMIDE 10 MG: 10 INJECTION, SOLUTION INTRAVENOUS at 15:24

## 2018-12-22 RX ADMIN — NITROGLYCERIN 20 MCG: 20 INJECTION INTRAVENOUS at 14:27

## 2018-12-22 RX ADMIN — ROCURONIUM BROMIDE 30 MG: 10 INJECTION INTRAVENOUS at 19:23

## 2018-12-22 RX ADMIN — VANCOMYCIN HYDROCHLORIDE 1000 MG: 1 INJECTION, SOLUTION INTRAVENOUS at 12:06

## 2018-12-22 RX ADMIN — NITROGLYCERIN 40 MCG: 20 INJECTION INTRAVENOUS at 14:28

## 2018-12-22 RX ADMIN — PROPOFOL 150 MG: 10 INJECTION, EMULSION INTRAVENOUS at 10:02

## 2018-12-22 RX ADMIN — DEXTRAN 40: 10 INJECTION, SOLUTION INTRAVENOUS at 15:10

## 2018-12-22 RX ADMIN — GLYCOPYRROLATE 0.1 MG: 0.2 INJECTION, SOLUTION INTRAMUSCULAR; INTRAVENOUS at 14:08

## 2018-12-22 RX ADMIN — CALCIUM CHLORIDE 1 G: 100 INJECTION, SOLUTION INTRAVENOUS at 12:42

## 2018-12-22 RX ADMIN — HYDROMORPHONE HYDROCHLORIDE 0.5 MG: 1 INJECTION, SOLUTION INTRAMUSCULAR; INTRAVENOUS; SUBCUTANEOUS at 18:18

## 2018-12-22 RX ADMIN — SODIUM CHLORIDE, SODIUM GLUCONATE, SODIUM ACETATE, POTASSIUM CHLORIDE AND MAGNESIUM CHLORIDE: 526; 502; 368; 37; 30 INJECTION, SOLUTION INTRAVENOUS at 11:57

## 2018-12-22 RX ADMIN — EPINEPHRINE 5 MCG: 1 INJECTION PARENTERAL at 11:54

## 2018-12-22 RX ADMIN — HYDROMORPHONE HYDROCHLORIDE 0.5 MG: 1 INJECTION, SOLUTION INTRAMUSCULAR; INTRAVENOUS; SUBCUTANEOUS at 14:51

## 2018-12-22 RX ADMIN — HUMAN INSULIN 6 UNITS/HR: 100 INJECTION, SOLUTION SUBCUTANEOUS at 16:30

## 2018-12-22 RX ADMIN — GLYCOPYRROLATE 0.2 MG: 0.2 INJECTION, SOLUTION INTRAMUSCULAR; INTRAVENOUS at 12:13

## 2018-12-22 RX ADMIN — CALCIUM CHLORIDE 0.5 G: 100 INJECTION, SOLUTION INTRAVENOUS at 15:21

## 2018-12-22 RX ADMIN — HUMAN INSULIN 2 UNITS/HR: 100 INJECTION, SOLUTION SUBCUTANEOUS at 12:21

## 2018-12-22 RX ADMIN — CALCIUM CHLORIDE 0.5 G: 100 INJECTION, SOLUTION INTRAVENOUS at 14:23

## 2018-12-22 RX ADMIN — ROCURONIUM BROMIDE 50 MG: 10 INJECTION INTRAVENOUS at 16:19

## 2018-12-22 RX ADMIN — FUROSEMIDE 10 MG: 10 INJECTION, SOLUTION INTRAVENOUS at 15:00

## 2018-12-22 RX ADMIN — PIPERACILLIN SODIUM,TAZOBACTAM SODIUM 2.25 G: 3; .375 INJECTION, POWDER, FOR SOLUTION INTRAVENOUS at 15:54

## 2018-12-22 RX ADMIN — PHENYLEPHRINE HYDROCHLORIDE 200 MCG: 10 INJECTION, SOLUTION INTRAMUSCULAR; INTRAVENOUS; SUBCUTANEOUS at 14:25

## 2018-12-22 RX ADMIN — Medication 2 MG: at 22:00

## 2018-12-22 RX ADMIN — SODIUM CHLORIDE, SODIUM GLUCONATE, SODIUM ACETATE, POTASSIUM CHLORIDE AND MAGNESIUM CHLORIDE: 526; 502; 368; 37; 30 INJECTION, SOLUTION INTRAVENOUS at 10:02

## 2018-12-22 RX ADMIN — SODIUM BICARBONATE 25 MEQ: 84 INJECTION, SOLUTION INTRAVENOUS at 14:24

## 2018-12-22 RX ADMIN — MYCOPHENOLATE MOFETIL 1000 MG: 200 POWDER, FOR SUSPENSION ORAL at 21:59

## 2018-12-22 RX ADMIN — EPINEPHRINE 0.03 MCG/KG/MIN: 1 INJECTION PARENTERAL at 13:45

## 2018-12-22 RX ADMIN — SODIUM CHLORIDE 1 G: 9 INJECTION, SOLUTION INTRAVENOUS at 14:40

## 2018-12-22 RX ADMIN — DEXTROSE AND SODIUM CHLORIDE: 5; 450 INJECTION, SOLUTION INTRAVENOUS at 21:36

## 2018-12-22 RX ADMIN — SODIUM CHLORIDE, POTASSIUM CHLORIDE, SODIUM LACTATE AND CALCIUM CHLORIDE: 600; 310; 30; 20 INJECTION, SOLUTION INTRAVENOUS at 21:42

## 2018-12-22 RX ADMIN — ATROPINE SULFATE 0.2 MG: 0.1 INJECTION, SOLUTION ENDOTRACHEAL; INTRAMUSCULAR; INTRAVENOUS; SUBCUTANEOUS at 11:50

## 2018-12-22 RX ADMIN — NITROGLYCERIN 20 MCG: 20 INJECTION INTRAVENOUS at 15:04

## 2018-12-22 RX ADMIN — FLUCONAZOLE 200 MG: 2 INJECTION, SOLUTION INTRAVENOUS at 21:37

## 2018-12-22 RX ADMIN — Medication 200 MG: at 10:02

## 2018-12-22 RX ADMIN — NOREPINEPHRINE BITARTRATE 6.4 MCG: 1 INJECTION INTRAVENOUS at 15:16

## 2018-12-22 RX ADMIN — NITROGLYCERIN 0.1 MCG/KG/MIN: 20 INJECTION INTRAVENOUS at 15:00

## 2018-12-22 RX ADMIN — ROCURONIUM BROMIDE 50 MG: 10 INJECTION INTRAVENOUS at 11:54

## 2018-12-22 RX ADMIN — SODIUM BICARBONATE 25 MEQ: 42 INJECTION, SOLUTION INTRAVENOUS at 16:33

## 2018-12-22 RX ADMIN — PHENYLEPHRINE HYDROCHLORIDE 100 MCG: 10 INJECTION, SOLUTION INTRAMUSCULAR; INTRAVENOUS; SUBCUTANEOUS at 15:41

## 2018-12-22 RX ADMIN — PROPOFOL 50 MCG/KG/MIN: 10 INJECTION, EMULSION INTRAVENOUS at 22:58

## 2018-12-22 RX ADMIN — FENTANYL CITRATE 100 MCG: 50 INJECTION, SOLUTION INTRAMUSCULAR; INTRAVENOUS at 10:02

## 2018-12-22 RX ADMIN — PIPERACILLIN SODIUM,TAZOBACTAM SODIUM 2.25 G: 3; .375 INJECTION, POWDER, FOR SOLUTION INTRAVENOUS at 17:38

## 2018-12-22 RX ADMIN — FLUCONAZOLE 200 MG: 2 INJECTION, SOLUTION INTRAVENOUS at 12:06

## 2018-12-22 RX ADMIN — SODIUM CHLORIDE, SODIUM GLUCONATE, SODIUM ACETATE, POTASSIUM CHLORIDE AND MAGNESIUM CHLORIDE: 526; 502; 368; 37; 30 INJECTION, SOLUTION INTRAVENOUS at 12:19

## 2018-12-22 RX ADMIN — PIPERACILLIN SODIUM,TAZOBACTAM SODIUM 2.25 G: 3; .375 INJECTION, POWDER, FOR SOLUTION INTRAVENOUS at 19:26

## 2018-12-22 ASSESSMENT — ACTIVITIES OF DAILY LIVING (ADL)
RETIRED_COMMUNICATION: 0-->UNDERSTANDS/COMMUNICATES WITHOUT DIFFICULTY
ADLS_ACUITY_SCORE: 17
AMBULATION: 0-->INDEPENDENT
ADLS_ACUITY_SCORE: 11
SWALLOWING: 0-->SWALLOWS FOODS/LIQUIDS WITHOUT DIFFICULTY
ADLS_ACUITY_SCORE: 13
COGNITION: 0 - NO COGNITION ISSUES REPORTED
FALL_HISTORY_WITHIN_LAST_SIX_MONTHS: NO
RETIRED_EATING: 0-->INDEPENDENT
TRANSFERRING: 0-->INDEPENDENT
DRESS: 0-->INDEPENDENT
TOILETING: 0-->INDEPENDENT
BATHING: 0-->INDEPENDENT

## 2018-12-22 ASSESSMENT — COLUMBIA-SUICIDE SEVERITY RATING SCALE - C-SSRS
1. IN THE PAST MONTH, HAVE YOU WISHED YOU WERE DEAD OR WISHED YOU COULD GO TO SLEEP AND NOT WAKE UP?: NO
2. HAVE YOU ACTUALLY HAD ANY THOUGHTS OF KILLING YOURSELF IN THE PAST MONTH?: NO
6. HAVE YOU EVER DONE ANYTHING, STARTED TO DO ANYTHING, OR PREPARED TO DO ANYTHING TO END YOUR LIFE?: NO

## 2018-12-22 ASSESSMENT — MIFFLIN-ST. JEOR: SCORE: 1589.73

## 2018-12-22 NOTE — PROGRESS NOTES
CLINICAL NUTRITION SERVICES - ASSESSMENT NOTE     Nutrition Prescription    RECOMMENDATIONS FOR MDs/PROVIDERS TO ORDER:  Diet adv vs begin nutrition support within 2-3 days. If/when EN support desired, enter nutrition consult: registered dietitian to assess and order tube feeding per MNT protocol.      Malnutrition Status:    Unable to determine    Future/Additional Recommendations:  1.  If/when diet is adv beyond CLs and pt tolerating PO intake, offer nutrition supplements (Boost Plus/Magic Cup) and begin calorie counting.     2. If EN needed, recommend begin the following regimen once appropriate FT access is achieved and confirmed on the correct position per AXR:  --Begin Nutren 1.5 @ 15 mL/hr and adv by 10 mL q 8 hrs if tolerated and/or per medical team discretion, pending GI status towards initial goal of 65 mL/hr to provide 2340 kcals (33 kcal/kg/day), 106 g PRO (1.5 g/kg/day), 1186 mL H2O, 275 g CHO and no fiber daily.  --Order 1 pkt ProSource TF BID.  --Order free water flushes 30 mL q 4 hrs for FT patency.  --Order Certavite to ensure adequate micronutrients in case of slow TF adv, EN interruptions, or hypermetabolic needs.  --Only adv TF if K/Mg WNL and Phos >1.9 upon daily AM lab draw.          REASON FOR ASSESSMENT  Loy Limon is a/an 63 year old male assessed by the dietitian for Admission Nutrition Risk Screen for unintentional loss of 10# or more in the past two months and Provider Order - Liver Transplant Pre Op    NUTRITION HISTORY  Information obtained from the chart only. Pt currently in the OR. Per the admission H&P, pt reported to have a good appetite PTA. Reported to have 3 diarrheal outputs daily. Pt was seen by an RD for liver transplant work up 5/29/18 (see chart review for detailed hx). He reported eating well and mostly preparing meals at home.     CURRENT NUTRITION ORDERS  Diet: NPO    LABS  Labs reviewed    MEDICATIONS  Medications reviewed    ANTHROPOMETRICS  Height: 170.2 cm (5'  "7\")  Most Recent Weight: 84.6 kg (186 lb 8.5 oz)    IBW: 67.3 kg  BMI: Overweight BMI 25-29.9  Weight History: Ranging ~82-86 kg the past month. Suspect fluid-status impacting wt variability with ESLD and intermitted LE edema.  Wt Readings from Last 10 Encounters:   12/22/18 84.6 kg (186 lb 8.5 oz)   12/20/18 83.1 kg (183 lb 3.2 oz)   12/04/18 83.7 kg (184 lb 8 oz)   11/07/18 81.6 kg (180 lb)   11/07/18 82 kg (180 lb 12.8 oz)   11/05/18 83.8 kg (184 lb 12.8 oz)   10/24/18 82.6 kg (182 lb 3.2 oz)   10/12/18 83.5 kg (184 lb)   10/04/18 83.9 kg (185 lb)   09/18/18 85.6 kg (188 lb 11.2 oz)       Dosing Weight: 71 kg (adjusted) - using driest recent wt of 83.1 kg (12/20/18) and IBW of 67.3 kg      ASSESSED NUTRITION NEEDS  Estimated Energy Needs: 4428-0279 kcals/day (25 - 30+ kcals/kg)  Justification: Maintenance; needs increase 2130 - 2485 (30-35 kcal/kg) if receives liver tx  Estimated Protein Needs:  grams protein/day (1.2 - 1.5 grams of pro/kg)  Justification: Hypercatabolism with acute illness; needs increase to 107-142 g protein (1.5-2 g/kg) if receives liver tx  Estimated Fluid Needs: 1 mL/kcal  Justification: Maintenance      PHYSICAL FINDINGS  Unable to complete physical assessment.     MALNUTRITION  % Intake: Unable to assess  % Weight Loss: None noted  Subcutaneous Fat Loss: Unable to assess  Muscle Loss: Unable to assess  Fluid Accumulation/Edema: Unable to assess  Malnutrition Diagnosis: Unable to determine due to unable to complete a physical assessment and recent nutrition hx    NUTRITION DIAGNOSIS  Predicted inadequate protein-energy intake related to anticipate prolonged intubation and EN support through post-transplant course as evidenced by currently NPO with unclear plan for diet adv and or/nutrition support at this time.      INTERVENTIONS  Implementation  Nutrition Education: No education needs assessed at this time.      Goals  Diet adv v nutrition support within 2-3 days.   "   Monitoring/Evaluation  Progress toward goals will be monitored and evaluated per protocol.    Ed Hamilton RD, LD  Wkend Pager: [phone]  SICU Wkday Pager: [phone]  7A Wkday Pager: [phone]

## 2018-12-22 NOTE — PLAN OF CARE
PT / 7A - PT orders received. Per chart - pt awaiting surgery this date. Will follow-up post surgery for PT eval.

## 2018-12-22 NOTE — PLAN OF CARE
"  /77 (BP Location: Right arm)   Pulse 59   Temp 98  F (36.7  C) (Oral)   Resp 18   Ht 1.702 m (5' 7\")   Wt 84.6 kg (186 lb 8.5 oz)   SpO2 100%   BMI 29.21 kg/m      [id number]  Loy \"Claribel\" Braxton worked up as a preop liver transplant pt; pt's wife at bedside;  used to complete admission database, answer questions, etc. PIV placed, right forearm. Up ad shawn; both showers taken; voiding not saving, last BM 12/21/18. Chest xray & EKG done, UA/UC sent, lab work and crossmatch drawn (crossmatch down in lab and to be picked up on Monday--sticky note left for team). Preop checklist completed; pt NPO since stanley of 12/21. Pt and his wife viewed pre-op videos in Lao; questions encouraged and answered.  at bedside now and here until 9am; report given to day nurse, Estuardo; consents are in pt chart and still need to be signed/witnessed. Pt resting quietly now/up in chair, watching television, call light and belongings within reach. Plan for surgery to take place ~9am-10am. Will continue to monitor and continue POC/notify team as needed.   "

## 2018-12-22 NOTE — PLAN OF CARE
OT 7A: Cx. Per chart review, pt is to having liver transplant surgery between 9-10am this morning. Will check back tomorrow.

## 2018-12-22 NOTE — H&P
[facility], [place]    History and Physical  Solid Organ Transplant     Date of Admission:  2018    Interpretor used    Assessment & Plan   Loy Limon is a 63 year old male with history significant for alcohol abuse w/ cirrhosis and portal hypertension, HCC s/p ablation, and latent TB (on Rifampin).    Patient was notified as an acceptable  donor organ became available and presented for further pre-operative work-up.  Patient was informed of the risks and benefits regarding  donor liver transplantation, and has elected to proceed.    -Admit for pre-op work-up  -Pre-op labs, including BMP, CBC, coag panel, viral serologies  -EKG, CXR      Code Status   Full Code  Disposition Plan   Expected discharge pending OR     Entered: Lala Del Rosario 2018, 11:07 PM     Discussed with on-call fellow, Dr. Jammie Del Rosario MD   General Surgery, PGY1    Primary Care Physician   Jaswinder Anne    Chief Complaint   Liver transplant candidate    History is obtained from the patient with the use of an interpretor    History of Present Illness   Loy Limon is a 63 year old male with history significant for alcohol abuse.  He did get diagnosed in 2018 with right-sided abdominal pain which then was followed with an imaging, and this showed that he had cirrhosis and also portal hypertension.  There were also lesions concerning for hepatocellular carcinoma. He did stay away from alcohol from 2018 and has currently fulfilled the 6-month sobriety and the recommendations by our .  Regarding his HCC, he did have by Dr. Debora Del Rosario on 2018 a CT-guided microwave ablation and after that no viable tumor was identified.  Otherwise, his weight has remained stable, might even gone up.  Appetite is good.  No edema, no jaundice.  He does not have any pruritus.  He has some easy bruising.  Denies all other issues including confusion and  memory issues.  No evidence of GI bleed, normal BMs.    Endorses diarrhea 3 x daily with TB medication. Also endorse lower extremity swelling intermittently. Has urinary frequency with his enlarged prostate.    H/o blood transfusion: never  Chronic anticoagulation: no  Last meal: 7:30pm     At the time of admission the patient:  Denies fevers, chills, URI symptoms, chest pain, dyspnea, abdominal pain, melena, or hematochezia.      Past Medical History:  Hepatocellular carcinoma  Cirrhosis of levier  Latent Tuberculosis  Hypertension  Enlarged prostate  Inguinal hernia  Liver lesion     Past Surgical History:  Appendectomy   Colonoscopy          Prior to Admission Medications   Prior to Admission Medications   Prescriptions Last Dose Informant Patient Reported? Taking?   NADOLOL PO  Self Yes No   Sig: Take 20 mg by mouth daily   XIFAXAN 550 MG TABS tablet   Yes No   Sig: Take 550 mg by mouth 2 times daily    XIFAXAN 550 MG TABS tablet   No No   Sig: TAKE ONE TABLET BY MOUTH TWO TIMES A DAY   alfuzosin (UROXATRAL) 10 MG 24 hr tablet   Yes No   Sig: Take 10 mg by mouth daily   omeprazole (PRILOSEC) 20 MG CR capsule  Self Yes No   Sig: Take 20 mg by mouth   rifampin (RIFADIN) 300 MG capsule   No No   Sig: Take 2 capsules (600 mg) by mouth daily   traMADol (ULTRAM) 50 MG tablet   No No   Sig: Take one po bid prn severe pain      Facility-Administered Medications: None     Allergies   No Known Allergies    Family History:   Liver disease - Brother    S/p cholecystectomy, gallbladder disease  - Sisters x3      Social History:   Monegasque speaking  Smoking Status: Former, 0.03 PPD for 20 years; cigarettes and cigars. Quit 3/2018  Alcohol Use: prior history, Quit 3/2018  No illicit drug use    Review of Systems   The 10 point Review of Systems is negative other than noted in the HPI or here.     Physical Exam   Temp: 98.3  F (36.8  C) Temp src: Oral BP: 163/70 Pulse: 66   Resp: 16 SpO2: 98 % O2 Device: None (Room air)    Vital  Signs with Ranges  Temp:  [98.3  F (36.8  C)] 98.3  F (36.8  C)  Pulse:  [66] 66  Resp:  [16] 16  BP: (163)/(70) 163/70  SpO2:  [98 %] 98 %  186 lbs 9.6 oz    Constitutional: Resting in bed, NAD  Eyes: injected sclera, slightly jaundiced, PERRL, EOMI, left sided nystagmus  ENT: nares patent, no rhinorrhea, MMM, OP clear  Respiratory: CTAB, no wheezing or crackles, nonlabored on room air  Cardiovascular: RRR no m/r/g  GI: soft, nondistended, mild RLQ and LLQ tenderness to palpation, no rebound, no guarding, no fluid wave  Skin: no rashes or lesions  Musculoskeletal: moves all extremities, normal tone, trace edema up to shins bilaterally  Neurologic: AOx3, no asterixis  Neuropsychiatric: appropriate affect    Data   Results for orders placed or performed during the hospital encounter of 12/21/18 (from the past 24 hour(s))   EKG 12-lead, tracing only   Result Value Ref Range    Interpretation ECG Click View Image link to view waveform and result

## 2018-12-22 NOTE — PROGRESS NOTES
SPIRITUAL HEALTH SERVICES  SPIRITUAL ASSESSMENT Progress Note  [facility] ([place]) 3C   ON-CALL VISIT    REFERRAL SOURCE: Request at Admission    Attempted a visit to pt on 7A. He had been brought into surgery by the time I arrived. Located family in the lounge and with the assistance of the , we prayed together, ending with the Our Father.    PLAN: I will attempt to visit Loy when he returns to the unit.    Denisa Lynne  Chaplain Resident  Pager  [phone]

## 2018-12-22 NOTE — ANESTHESIA PROCEDURE NOTES
Central Line Procedure Note  Staff:     Anesthesiologist:  Feliciano Agudelo MD    Resident/CRNA:  Karen Costa MD    Central line placed by Resident/CRNA in the presence of a teaching physician    Location: In OR after induction  Procedure Start/Stop Times:     patient identified, IV checked, site marked, risks and benefits discussed, informed consent, monitors and equipment checked, pre-op evaluation and at physician/surgeon's request      Correct Patient: Yes      Correct Position: Yes      Correct Site: Yes      Correct Procedure: Yes      Correct Laterality:  Yes    Site Marked:  Yes  Line Placement:     Procedure:  Central Line    Insertion laterality:  Right    Insertion site:  Internal Jugular    Position:  Supine      Maximal Sterile Barriers: All elements of maximal sterile barrier technique followed      (Maximal sterile barriers include:   Sterile gown, Sterile Gloves, Mask, Cap, Whole body draped, hand hygiene and acceptable skin prep).Skin Prep: Chloraprep         Injection Technique:  Ultrasound guided    Sterile Ultrasound Technique:  Sterile probe cover and Sterile gel    Vein evaluated via U/S for patency/adequacy of catheter insertion and is adequate.  Using realtime U/S imaging the vein was punctured, and needle was observed entering vein on U/S      A permanent image is NOT entered into the patient's record.      Local skin infiltration:  None    Catheter size:  9 Fr, 2 lumen 11.5 cm (MAC)    Catheter length at skin (cm):  15    Cath secured with: suture      Dressing:  Tegaderm and Biopatch    Complications:  None obvious    Blood aspirated all lumens: Yes      All Lumens Flushed: Yes      Verification method:  Placement to be verified post-op

## 2018-12-22 NOTE — TELEPHONE ENCOUNTER
TRANSPLANT OR REPORT    Organ: Liver  Laterality (if known): NA  Organ Location: Import    UNOS ID:  [id number]  Donor OR Time: 0430  Expected/Actual Cross Clamp Time: 0600  Expected Organ Arrival Time: 0900    Surgeon: Swathi  Time in OR: 0900  Time in 3C (N/A for LI): NA    Recipient Details  Admission ETA: 2300  Unit: 7A  Isolation: None  Latex Allergy: None  : Kiswahili  Diagnosis: Cirrhosis    Liver Transplants  Bypass: Pending  Hemodialysis: No    Kidney/Panc Transplants  XM Status (Need to wait for XM?): No        Transplant Coordinator Contact Info: Lina

## 2018-12-22 NOTE — ANESTHESIA PREPROCEDURE EVALUATION
Anesthesia Pre-Procedure Evaluation    Patient: Loy Limon   MRN:     [id number] Gender:   male   Age:    63 year old :      1955        Preoperative Diagnosis: cirrhosis of liver   Procedure(s):  TRANSPLANT LIVER RECIPIENT  DONOR     Past Medical History:   Diagnosis Date     Cancer (H)     hepatocellualr carcinoma     Cirrhosis of liver (H) 2018     Enlarged prostate      Inguinal hernia      Liver lesion 2018      Past Surgical History:   Procedure Laterality Date     APPENDECTOMY       COLONOSCOPY      2018          Anesthesia Evaluation     . Pt has had prior anesthetic. Type: General    No history of anesthetic complications          ROS/MED HX    ENT/Pulmonary:  - neg pulmonary ROS     Neurologic:  - neg neurologic ROS     Cardiovascular: Comment: Diffuse CAD disease -LAD.Right heart cath shows PA-34/21    (+) hypertension--CAD, --. : . . . :. . Previous cardiac testing       METS/Exercise Tolerance:     Hematologic:  - neg hematologic  ROS       Musculoskeletal:  - neg musculoskeletal ROS       GI/Hepatic: Comment: Alcoholic liver cirrhosis and HCC    (+) liver disease,       Renal/Genitourinary:  - ROS Renal section negative       Endo:  - neg endo ROS       Psychiatric:  - neg psychiatric ROS       Infectious Disease:  - neg infectious disease ROS       Malignancy:   (+) Malignancy History of GI  GI CA  Active status post Radiation,         Other:    - neg other ROS                     PHYSICAL EXAM:   Mental Status/Neuro: A/A/O   Airway: Facies: Feasible  Mallampati: II  Mouth/Opening: Full  TM distance: > 6 cm  Neck ROM: Full   Respiratory: Auscultation: CTAB     Resp. Rate: Normal     Resp. Effort: Normal      CV: Rhythm: Regular  Heart: Normal Sounds  Pulses: Normal   Comments:      Dental: Normal                  Lab Results   Component Value Date    WBC 3.1 (L) 2018    HGB 12.0 (L) 2018    HCT 36.4 (L) 2018    PLT 75 (L) 2018     2018  "   POTASSIUM 4.5 12/22/2018    CHLORIDE 109 12/22/2018    CO2 26 12/22/2018    BUN 10 12/22/2018    CR 0.50 (L) 12/22/2018     (H) 12/22/2018    YELENA 7.7 (L) 12/22/2018    PHOS 2.8 12/22/2018    MAG 2.0 12/22/2018    ALBUMIN 2.5 (L) 12/22/2018    PROTTOTAL 6.5 (L) 12/22/2018    ALT 40 12/22/2018    AST 48 (H) 12/22/2018    ALKPHOS 408 (H) 12/22/2018    BILITOTAL 1.7 (H) 12/22/2018    LIPASE 55 (L) 10/24/2018    AMYLASE 67 12/22/2018    PTT 38 (H) 12/22/2018    INR 1.77 (H) 12/22/2018    FIBR 251 12/22/2018    TSH 6.36 (H) 07/19/2018    T4 1.04 07/19/2018       Preop Vitals  BP Readings from Last 3 Encounters:   12/22/18 135/74   12/20/18 149/75   12/04/18 168/83    Pulse Readings from Last 3 Encounters:   12/22/18 58   12/20/18 52   12/04/18 59      Resp Readings from Last 3 Encounters:   12/22/18 16   12/04/18 18   11/07/18 18    SpO2 Readings from Last 3 Encounters:   12/22/18 99%   12/20/18 96%   11/07/18 99%      Temp Readings from Last 1 Encounters:   12/22/18 36.6  C (97.9  F) (Oral)    Ht Readings from Last 1 Encounters:   12/22/18 1.702 m (5' 7\")      Wt Readings from Last 1 Encounters:   12/22/18 84.6 kg (186 lb 8.5 oz)    Estimated body mass index is 29.21 kg/m  as calculated from the following:    Height as of this encounter: 1.702 m (5' 7\").    Weight as of this encounter: 84.6 kg (186 lb 8.5 oz).     LDA:  Peripheral IV 12/21/18 Right Lower forearm (Active)   Site Assessment WDL 12/22/2018 12:00 AM   Line Status Saline locked 12/22/2018 12:00 AM   Phlebitis Scale 0-->no symptoms 12/22/2018 12:00 AM   Infiltration Scale 0 12/22/2018 12:00 AM   Infiltration Site Treatment Method  None 12/22/2018 12:00 AM   Extravasation? No 12/22/2018 12:00 AM   Number of days: 1            Assessment:   ASA SCORE: 3 emergent     NPO Status: > 6 hours since completed Solid Foods   Documentation: H&P complete; Consents complete   Proceeding: Proceed without further delay     Plan:   Anes. Type:  General             "              CONSENT: Direct conversation   Plan and risks discussed with: Patient   Blood Products: Consented (ALL Blood Products)       Comments for Plan/Consent:  GETA. PIVx2-3 (large bore) + MACx2 + PAC. Standard ASA monitors + arterial line, CVP, PAP, BIS. IV opioids. ICU postop    Risks and benefits of anesthetic discussed with patient including sore throat, voice hoarseness, injury to vocal cords, throat, mouth, teeth, tongue, and lips from intubation; nausea/vomiting; cardiac arrest, respiratory complications, MI, stroke, blood clots, death.    Transfusion risks discussed include infection, and complications involving the heart and lungs (transfusion reaction)    Arterial line risks discussed include bleeding/hematoma, blood clot, nerve damage, vascular damage, ischemia, loss of limb.    Central line risks discussed include bleeding/hematoma, infection, nerve damage, vascular damage, blood clot, pneumothorax.    We also discussed potential prolonged intubation.    Presented opportunity to answer patient and family questions. Questions addressed.                           Karen Costa MD

## 2018-12-22 NOTE — ANESTHESIA PROCEDURE NOTES
Arterial Line Procedure Note  Staff:     Anesthesiologist:  Feliciano Agudelo MD    Resident/CRNA:  Karen Costa MD    Arterial line performed by resident/CRNA in presence of a teaching physician    Location: In OR After Induction  Procedure Start/Stop Times:     patient identified, IV checked, site marked, risks and benefits discussed, informed consent, monitors and equipment checked, pre-op evaluation and at physician/surgeon's request      Correct Patient: Yes      Correct Position: Yes      Correct Site: Yes      Correct Procedure: Yes      Correct Laterality:  Yes    Site Marked:  Yes  Line Placement:     Procedure:  Arterial Line    Insertion Site:  Radial    Insertion laterality:  Left    Patient Prep: patient draped, mask, sterile gloves, hat and hand hygiene      Local skin infiltration:  None    Ultrasound Guided?: No      Catheter size:  20 gauge, 12 cm    Cath secured with: suture      Dressing:  Tegaderm    Complications:  None obvious    Arterial waveform: Yes      IBP within 10% of NIBP: Yes

## 2018-12-23 ENCOUNTER — APPOINTMENT (OUTPATIENT)
Dept: GENERAL RADIOLOGY | Facility: CLINIC | Age: 63
DRG: 005 | End: 2018-12-23
Attending: TRANSPLANT SURGERY
Payer: MEDICARE

## 2018-12-23 ENCOUNTER — DOCUMENTATION ONLY (OUTPATIENT)
Dept: TRANSPLANT | Facility: CLINIC | Age: 63
End: 2018-12-23

## 2018-12-23 ENCOUNTER — APPOINTMENT (OUTPATIENT)
Dept: OCCUPATIONAL THERAPY | Facility: CLINIC | Age: 63
DRG: 005 | End: 2018-12-23
Attending: TRANSPLANT SURGERY
Payer: MEDICARE

## 2018-12-23 LAB
ALBUMIN SERPL-MCNC: 1.6 G/DL (ref 3.4–5)
ALP SERPL-CCNC: 166 U/L (ref 40–150)
ALT SERPL W P-5'-P-CCNC: 602 U/L (ref 0–70)
AMYLASE SERPL-CCNC: 46 U/L (ref 30–110)
ANION GAP SERPL CALCULATED.3IONS-SCNC: 8 MMOL/L (ref 3–14)
AST SERPL W P-5'-P-CCNC: 773 U/L (ref 0–45)
BASE EXCESS BLDA CALC-SCNC: 1.1 MMOL/L
BASOPHILS # BLD AUTO: 0 10E9/L (ref 0–0.2)
BASOPHILS # BLD AUTO: 0 10E9/L (ref 0–0.2)
BASOPHILS NFR BLD AUTO: 0 %
BASOPHILS NFR BLD AUTO: 0 %
BILIRUB DIRECT SERPL-MCNC: 1.2 MG/DL (ref 0–0.2)
BILIRUB SERPL-MCNC: 1.7 MG/DL (ref 0.2–1.3)
BUN SERPL-MCNC: 18 MG/DL (ref 7–30)
CA-I BLD-MCNC: 4.5 MG/DL (ref 4.4–5.2)
CALCIUM SERPL-MCNC: 6.9 MG/DL (ref 8.5–10.1)
CHLORIDE SERPL-SCNC: 112 MMOL/L (ref 94–109)
CO2 SERPL-SCNC: 24 MMOL/L (ref 20–32)
CREAT SERPL-MCNC: 0.66 MG/DL (ref 0.66–1.25)
DIFFERENTIAL METHOD BLD: ABNORMAL
DIFFERENTIAL METHOD BLD: ABNORMAL
EOSINOPHIL # BLD AUTO: 0 10E9/L (ref 0–0.7)
EOSINOPHIL # BLD AUTO: 0 10E9/L (ref 0–0.7)
EOSINOPHIL NFR BLD AUTO: 0 %
EOSINOPHIL NFR BLD AUTO: 0 %
ERYTHROCYTE [DISTWIDTH] IN BLOOD BY AUTOMATED COUNT: 14.3 % (ref 10–15)
ERYTHROCYTE [DISTWIDTH] IN BLOOD BY AUTOMATED COUNT: 14.6 % (ref 10–15)
FIBRINOGEN PPP-MCNC: 200 MG/DL (ref 200–420)
FIBRINOGEN PPP-MCNC: 220 MG/DL (ref 200–420)
GFR SERPL CREATININE-BSD FRML MDRD: >90 ML/MIN/{1.73_M2}
GLUCOSE BLDC GLUCOMTR-MCNC: 100 MG/DL (ref 70–99)
GLUCOSE BLDC GLUCOMTR-MCNC: 104 MG/DL (ref 70–99)
GLUCOSE BLDC GLUCOMTR-MCNC: 125 MG/DL (ref 70–99)
GLUCOSE BLDC GLUCOMTR-MCNC: 128 MG/DL (ref 70–99)
GLUCOSE BLDC GLUCOMTR-MCNC: 129 MG/DL (ref 70–99)
GLUCOSE BLDC GLUCOMTR-MCNC: 134 MG/DL (ref 70–99)
GLUCOSE BLDC GLUCOMTR-MCNC: 137 MG/DL (ref 70–99)
GLUCOSE BLDC GLUCOMTR-MCNC: 139 MG/DL (ref 70–99)
GLUCOSE BLDC GLUCOMTR-MCNC: 141 MG/DL (ref 70–99)
GLUCOSE BLDC GLUCOMTR-MCNC: 142 MG/DL (ref 70–99)
GLUCOSE BLDC GLUCOMTR-MCNC: 143 MG/DL (ref 70–99)
GLUCOSE BLDC GLUCOMTR-MCNC: 179 MG/DL (ref 70–99)
GLUCOSE BLDC GLUCOMTR-MCNC: 209 MG/DL (ref 70–99)
GLUCOSE BLDC GLUCOMTR-MCNC: 229 MG/DL (ref 70–99)
GLUCOSE BLDC GLUCOMTR-MCNC: 98 MG/DL (ref 70–99)
GLUCOSE SERPL-MCNC: 107 MG/DL (ref 70–99)
HCO3 BLD-SCNC: 26 MMOL/L (ref 21–28)
HCT VFR BLD AUTO: 26.3 % (ref 40–53)
HCT VFR BLD AUTO: 26.4 % (ref 40–53)
HGB BLD-MCNC: 8.7 G/DL (ref 13.3–17.7)
HGB BLD-MCNC: 8.7 G/DL (ref 13.3–17.7)
IMM GRANULOCYTES # BLD: 0 10E9/L (ref 0–0.4)
IMM GRANULOCYTES # BLD: 0 10E9/L (ref 0–0.4)
IMM GRANULOCYTES NFR BLD: 0.1 %
IMM GRANULOCYTES NFR BLD: 0.1 %
INR PPP: 1.36 (ref 0.86–1.14)
INR PPP: 1.61 (ref 0.86–1.14)
LACTATE BLD-SCNC: 0.8 MMOL/L (ref 0.7–2)
LACTATE BLD-SCNC: 0.8 MMOL/L (ref 0.7–2)
LIPASE SERPL-CCNC: 93 U/L (ref 73–393)
LYMPHOCYTES # BLD AUTO: 0.5 10E9/L (ref 0.8–5.3)
LYMPHOCYTES # BLD AUTO: 0.6 10E9/L (ref 0.8–5.3)
LYMPHOCYTES NFR BLD AUTO: 4.9 %
LYMPHOCYTES NFR BLD AUTO: 7.5 %
MAGNESIUM SERPL-MCNC: 1.7 MG/DL (ref 1.6–2.3)
MCH RBC QN AUTO: 32.1 PG (ref 26.5–33)
MCH RBC QN AUTO: 32.3 PG (ref 26.5–33)
MCHC RBC AUTO-ENTMCNC: 33 G/DL (ref 31.5–36.5)
MCHC RBC AUTO-ENTMCNC: 33.1 G/DL (ref 31.5–36.5)
MCV RBC AUTO: 97 FL (ref 78–100)
MCV RBC AUTO: 98 FL (ref 78–100)
MONOCYTES # BLD AUTO: 0.2 10E9/L (ref 0–1.3)
MONOCYTES # BLD AUTO: 0.2 10E9/L (ref 0–1.3)
MONOCYTES NFR BLD AUTO: 1.4 %
MONOCYTES NFR BLD AUTO: 2.5 %
NEUTROPHILS # BLD AUTO: 10.6 10E9/L (ref 1.6–8.3)
NEUTROPHILS # BLD AUTO: 6.5 10E9/L (ref 1.6–8.3)
NEUTROPHILS NFR BLD AUTO: 89.9 %
NEUTROPHILS NFR BLD AUTO: 93.6 %
NRBC # BLD AUTO: 0 10*3/UL
NRBC # BLD AUTO: 0 10*3/UL
NRBC BLD AUTO-RTO: 0 /100
NRBC BLD AUTO-RTO: 0 /100
O2/TOTAL GAS SETTING VFR VENT: 40 %
PCO2 BLD: 41 MM HG (ref 35–45)
PH BLD: 7.41 PH (ref 7.35–7.45)
PHOSPHATE SERPL-MCNC: 4.4 MG/DL (ref 2.5–4.5)
PLATELET # BLD AUTO: 50 10E9/L (ref 150–450)
PLATELET # BLD AUTO: 53 10E9/L (ref 150–450)
PO2 BLD: 71 MM HG (ref 80–105)
POTASSIUM SERPL-SCNC: 3.6 MMOL/L (ref 3.4–5.3)
PROT SERPL-MCNC: 4.3 G/DL (ref 6.8–8.8)
RBC # BLD AUTO: 2.69 10E12/L (ref 4.4–5.9)
RBC # BLD AUTO: 2.71 10E12/L (ref 4.4–5.9)
SODIUM SERPL-SCNC: 144 MMOL/L (ref 133–144)
WBC # BLD AUTO: 11.3 10E9/L (ref 4–11)
WBC # BLD AUTO: 7.2 10E9/L (ref 4–11)

## 2018-12-23 PROCEDURE — A9270 NON-COVERED ITEM OR SERVICE: HCPCS | Mod: GY | Performed by: STUDENT IN AN ORGANIZED HEALTH CARE EDUCATION/TRAINING PROGRAM

## 2018-12-23 PROCEDURE — 80076 HEPATIC FUNCTION PANEL: CPT | Performed by: SURGERY

## 2018-12-23 PROCEDURE — 25000128 H RX IP 250 OP 636: Performed by: PHYSICIAN ASSISTANT

## 2018-12-23 PROCEDURE — 00000146 ZZHCL STATISTIC GLUCOSE BY METER IP

## 2018-12-23 PROCEDURE — 25000128 H RX IP 250 OP 636: Performed by: STUDENT IN AN ORGANIZED HEALTH CARE EDUCATION/TRAINING PROGRAM

## 2018-12-23 PROCEDURE — 40000275 ZZH STATISTIC RCP TIME EA 10 MIN

## 2018-12-23 PROCEDURE — A9270 NON-COVERED ITEM OR SERVICE: HCPCS | Mod: GY | Performed by: SURGERY

## 2018-12-23 PROCEDURE — 84100 ASSAY OF PHOSPHORUS: CPT | Performed by: SURGERY

## 2018-12-23 PROCEDURE — 71045 X-RAY EXAM CHEST 1 VIEW: CPT

## 2018-12-23 PROCEDURE — 82150 ASSAY OF AMYLASE: CPT | Performed by: SURGERY

## 2018-12-23 PROCEDURE — 40000014 ZZH STATISTIC ARTERIAL MONITORING DAILY

## 2018-12-23 PROCEDURE — 94002 VENT MGMT INPAT INIT DAY: CPT

## 2018-12-23 PROCEDURE — 25000131 ZZH RX MED GY IP 250 OP 636 PS 637: Mod: GY | Performed by: PHYSICIAN ASSISTANT

## 2018-12-23 PROCEDURE — 83735 ASSAY OF MAGNESIUM: CPT | Performed by: SURGERY

## 2018-12-23 PROCEDURE — 97530 THERAPEUTIC ACTIVITIES: CPT | Mod: GO | Performed by: OCCUPATIONAL THERAPIST

## 2018-12-23 PROCEDURE — 25000128 H RX IP 250 OP 636: Performed by: SURGERY

## 2018-12-23 PROCEDURE — 40000048 ZZH STATISTIC DAILY SWAN MONITORING

## 2018-12-23 PROCEDURE — 25000132 ZZH RX MED GY IP 250 OP 250 PS 637: Mod: GY | Performed by: SURGERY

## 2018-12-23 PROCEDURE — 25000132 ZZH RX MED GY IP 250 OP 250 PS 637: Mod: GY | Performed by: STUDENT IN AN ORGANIZED HEALTH CARE EDUCATION/TRAINING PROGRAM

## 2018-12-23 PROCEDURE — 12000024 ZZH R&B TRANSPLANT CRITICAL

## 2018-12-23 PROCEDURE — 83605 ASSAY OF LACTIC ACID: CPT | Performed by: PHYSICIAN ASSISTANT

## 2018-12-23 PROCEDURE — 40000196 ZZH STATISTIC RAPCV CVP MONITORING

## 2018-12-23 PROCEDURE — 25000125 ZZHC RX 250: Performed by: SURGERY

## 2018-12-23 PROCEDURE — 99233 SBSQ HOSP IP/OBS HIGH 50: CPT | Mod: GC | Performed by: SURGERY

## 2018-12-23 PROCEDURE — 85025 COMPLETE CBC W/AUTO DIFF WBC: CPT | Performed by: PHYSICIAN ASSISTANT

## 2018-12-23 PROCEDURE — 82330 ASSAY OF CALCIUM: CPT | Performed by: SURGERY

## 2018-12-23 PROCEDURE — 80048 BASIC METABOLIC PNL TOTAL CA: CPT | Performed by: SURGERY

## 2018-12-23 PROCEDURE — 97165 OT EVAL LOW COMPLEX 30 MIN: CPT | Mod: GO | Performed by: OCCUPATIONAL THERAPIST

## 2018-12-23 PROCEDURE — 82803 BLOOD GASES ANY COMBINATION: CPT | Performed by: SURGERY

## 2018-12-23 PROCEDURE — 93010 ELECTROCARDIOGRAM REPORT: CPT | Performed by: INTERNAL MEDICINE

## 2018-12-23 PROCEDURE — 85384 FIBRINOGEN ACTIVITY: CPT | Performed by: SURGERY

## 2018-12-23 PROCEDURE — 85610 PROTHROMBIN TIME: CPT | Performed by: PHYSICIAN ASSISTANT

## 2018-12-23 PROCEDURE — 40000133 ZZH STATISTIC OT WARD VISIT: Performed by: OCCUPATIONAL THERAPIST

## 2018-12-23 PROCEDURE — 85384 FIBRINOGEN ACTIVITY: CPT | Performed by: PHYSICIAN ASSISTANT

## 2018-12-23 PROCEDURE — 27210995 ZZH RX 272: Performed by: SURGERY

## 2018-12-23 PROCEDURE — A9270 NON-COVERED ITEM OR SERVICE: HCPCS | Mod: GY | Performed by: PHYSICIAN ASSISTANT

## 2018-12-23 PROCEDURE — 25000132 ZZH RX MED GY IP 250 OP 250 PS 637: Mod: GY | Performed by: PHYSICIAN ASSISTANT

## 2018-12-23 PROCEDURE — 25000131 ZZH RX MED GY IP 250 OP 636 PS 637: Mod: GY | Performed by: SURGERY

## 2018-12-23 PROCEDURE — 25000128 H RX IP 250 OP 636: Performed by: NURSE PRACTITIONER

## 2018-12-23 PROCEDURE — 25000128 H RX IP 250 OP 636: Performed by: TRANSPLANT SURGERY

## 2018-12-23 PROCEDURE — 93005 ELECTROCARDIOGRAM TRACING: CPT

## 2018-12-23 PROCEDURE — 85025 COMPLETE CBC W/AUTO DIFF WBC: CPT | Performed by: SURGERY

## 2018-12-23 PROCEDURE — 83605 ASSAY OF LACTIC ACID: CPT | Performed by: SURGERY

## 2018-12-23 PROCEDURE — 83690 ASSAY OF LIPASE: CPT | Performed by: SURGERY

## 2018-12-23 PROCEDURE — 85610 PROTHROMBIN TIME: CPT | Performed by: SURGERY

## 2018-12-23 RX ORDER — HYDROMORPHONE HYDROCHLORIDE 1 MG/ML
.3-.5 INJECTION, SOLUTION INTRAMUSCULAR; INTRAVENOUS; SUBCUTANEOUS
Status: DISCONTINUED | OUTPATIENT
Start: 2018-12-23 | End: 2018-12-26

## 2018-12-23 RX ORDER — POTASSIUM CHLORIDE 7.45 MG/ML
10 INJECTION INTRAVENOUS
Status: DISCONTINUED | OUTPATIENT
Start: 2018-12-23 | End: 2018-12-23

## 2018-12-23 RX ORDER — MAGNESIUM SULFATE HEPTAHYDRATE 40 MG/ML
4 INJECTION, SOLUTION INTRAVENOUS EVERY 4 HOURS PRN
Status: DISCONTINUED | OUTPATIENT
Start: 2018-12-23 | End: 2018-12-23

## 2018-12-23 RX ORDER — FUROSEMIDE 10 MG/ML
40 INJECTION INTRAMUSCULAR; INTRAVENOUS ONCE
Status: COMPLETED | OUTPATIENT
Start: 2018-12-23 | End: 2018-12-23

## 2018-12-23 RX ORDER — HYDROMORPHONE HYDROCHLORIDE 1 MG/ML
.3-.5 INJECTION, SOLUTION INTRAMUSCULAR; INTRAVENOUS; SUBCUTANEOUS
Status: DISCONTINUED | OUTPATIENT
Start: 2018-12-23 | End: 2018-12-23

## 2018-12-23 RX ORDER — AMOXICILLIN 250 MG
2 CAPSULE ORAL 2 TIMES DAILY
Status: DISCONTINUED | OUTPATIENT
Start: 2018-12-23 | End: 2019-01-07 | Stop reason: HOSPADM

## 2018-12-23 RX ORDER — POTASSIUM CHLORIDE 750 MG/1
20-40 TABLET, EXTENDED RELEASE ORAL
Status: DISCONTINUED | OUTPATIENT
Start: 2018-12-23 | End: 2018-12-23

## 2018-12-23 RX ORDER — POTASSIUM CL/LIDO/0.9 % NACL 10MEQ/0.1L
10 INTRAVENOUS SOLUTION, PIGGYBACK (ML) INTRAVENOUS
Status: DISCONTINUED | OUTPATIENT
Start: 2018-12-23 | End: 2018-12-23

## 2018-12-23 RX ORDER — POLYETHYLENE GLYCOL 3350 17 G/17G
17 POWDER, FOR SOLUTION ORAL DAILY
Status: DISCONTINUED | OUTPATIENT
Start: 2018-12-23 | End: 2019-01-02

## 2018-12-23 RX ORDER — MAGNESIUM SULFATE HEPTAHYDRATE 40 MG/ML
2 INJECTION, SOLUTION INTRAVENOUS DAILY PRN
Status: DISCONTINUED | OUTPATIENT
Start: 2018-12-23 | End: 2018-12-23

## 2018-12-23 RX ORDER — POTASSIUM CHLORIDE 29.8 MG/ML
20 INJECTION INTRAVENOUS
Status: DISCONTINUED | OUTPATIENT
Start: 2018-12-23 | End: 2018-12-23

## 2018-12-23 RX ORDER — OXYCODONE HYDROCHLORIDE 5 MG/1
5-10 TABLET ORAL
Status: DISCONTINUED | OUTPATIENT
Start: 2018-12-23 | End: 2018-12-26

## 2018-12-23 RX ORDER — POTASSIUM CHLORIDE 1.5 G/1.58G
20-40 POWDER, FOR SOLUTION ORAL
Status: DISCONTINUED | OUTPATIENT
Start: 2018-12-23 | End: 2018-12-23

## 2018-12-23 RX ADMIN — PROPOFOL 30 MCG/KG/MIN: 10 INJECTION, EMULSION INTRAVENOUS at 07:26

## 2018-12-23 RX ADMIN — FLUCONAZOLE 200 MG: 2 INJECTION, SOLUTION INTRAVENOUS at 21:54

## 2018-12-23 RX ADMIN — OXYCODONE HYDROCHLORIDE 10 MG: 5 TABLET ORAL at 09:08

## 2018-12-23 RX ADMIN — HUMAN INSULIN 2 UNITS/HR: 100 INJECTION, SOLUTION SUBCUTANEOUS at 22:16

## 2018-12-23 RX ADMIN — FUROSEMIDE 40 MG: 10 INJECTION, SOLUTION INTRAVENOUS at 15:09

## 2018-12-23 RX ADMIN — OMEPRAZOLE 20 MG: 20 CAPSULE, DELAYED RELEASE ORAL at 07:48

## 2018-12-23 RX ADMIN — HUMAN INSULIN 2 UNITS/HR: 100 INJECTION, SOLUTION SUBCUTANEOUS at 13:01

## 2018-12-23 RX ADMIN — OXYCODONE HYDROCHLORIDE 10 MG: 5 TABLET ORAL at 16:22

## 2018-12-23 RX ADMIN — SODIUM CHLORIDE: 4.5 INJECTION, SOLUTION INTRAVENOUS at 00:14

## 2018-12-23 RX ADMIN — SODIUM CHLORIDE 200 MG: 9 INJECTION, SOLUTION INTRAVENOUS at 07:36

## 2018-12-23 RX ADMIN — OXYCODONE HYDROCHLORIDE 10 MG: 5 TABLET ORAL at 12:12

## 2018-12-23 RX ADMIN — MYCOPHENOLATE MOFETIL 1000 MG: 250 CAPSULE ORAL at 17:39

## 2018-12-23 RX ADMIN — OXYCODONE HYDROCHLORIDE 10 MG: 5 TABLET ORAL at 22:18

## 2018-12-23 RX ADMIN — Medication 2 MG: at 07:42

## 2018-12-23 RX ADMIN — VANCOMYCIN HYDROCHLORIDE 1500 MG: 10 INJECTION, POWDER, LYOPHILIZED, FOR SOLUTION INTRAVENOUS at 23:57

## 2018-12-23 RX ADMIN — Medication 0.5 MG: at 13:28

## 2018-12-23 RX ADMIN — OXYCODONE HYDROCHLORIDE 10 MG: 5 TABLET ORAL at 19:42

## 2018-12-23 RX ADMIN — POLYETHYLENE GLYCOL 3350 17 G: 17 POWDER, FOR SOLUTION ORAL at 12:12

## 2018-12-23 RX ADMIN — TACROLIMUS 2 MG: 0.5 CAPSULE, GELATIN COATED ORAL at 17:39

## 2018-12-23 RX ADMIN — PIPERACILLIN SODIUM,TAZOBACTAM SODIUM 3.38 G: 3; .375 INJECTION, POWDER, FOR SOLUTION INTRAVENOUS at 01:56

## 2018-12-23 RX ADMIN — VALGANCICLOVIR HYDROCHLORIDE 450 MG: 50 POWDER, FOR SOLUTION ORAL at 07:41

## 2018-12-23 RX ADMIN — MYCOPHENOLATE MOFETIL 1000 MG: 200 POWDER, FOR SUSPENSION ORAL at 07:41

## 2018-12-23 RX ADMIN — PIPERACILLIN SODIUM,TAZOBACTAM SODIUM 3.38 G: 3; .375 INJECTION, POWDER, FOR SOLUTION INTRAVENOUS at 07:48

## 2018-12-23 RX ADMIN — PIPERACILLIN SODIUM,TAZOBACTAM SODIUM 3.38 G: 3; .375 INJECTION, POWDER, FOR SOLUTION INTRAVENOUS at 19:36

## 2018-12-23 RX ADMIN — VANCOMYCIN HYDROCHLORIDE 1500 MG: 10 INJECTION, POWDER, LYOPHILIZED, FOR SOLUTION INTRAVENOUS at 12:12

## 2018-12-23 RX ADMIN — PIPERACILLIN SODIUM,TAZOBACTAM SODIUM 3.38 G: 3; .375 INJECTION, POWDER, FOR SOLUTION INTRAVENOUS at 15:09

## 2018-12-23 RX ADMIN — HUMAN INSULIN 4 UNITS/HR: 100 INJECTION, SOLUTION SUBCUTANEOUS at 19:15

## 2018-12-23 RX ADMIN — SODIUM CHLORIDE: 4.5 INJECTION, SOLUTION INTRAVENOUS at 09:02

## 2018-12-23 RX ADMIN — SODIUM CHLORIDE, POTASSIUM CHLORIDE, SODIUM LACTATE AND CALCIUM CHLORIDE: 600; 310; 30; 20 INJECTION, SOLUTION INTRAVENOUS at 07:28

## 2018-12-23 RX ADMIN — PROPOFOL 30 MCG/KG/MIN: 10 INJECTION, EMULSION INTRAVENOUS at 00:54

## 2018-12-23 RX ADMIN — VANCOMYCIN HYDROCHLORIDE 1500 MG: 10 INJECTION, POWDER, LYOPHILIZED, FOR SOLUTION INTRAVENOUS at 00:18

## 2018-12-23 RX ADMIN — ASPIRIN 325 MG: 325 TABLET, DELAYED RELEASE ORAL at 12:12

## 2018-12-23 RX ADMIN — HUMAN INSULIN 5 UNITS/HR: 100 INJECTION, SOLUTION SUBCUTANEOUS at 01:53

## 2018-12-23 RX ADMIN — INSULIN ASPART 5 UNITS: 100 INJECTION, SOLUTION INTRAVENOUS; SUBCUTANEOUS at 17:40

## 2018-12-23 RX ADMIN — SULFAMETHOXAZOLE AND TRIMETHOPRIM 1 TABLET: 400; 80 TABLET ORAL at 07:48

## 2018-12-23 RX ADMIN — POTASSIUM CHLORIDE 20 MEQ: 1.5 POWDER, FOR SOLUTION ORAL at 08:16

## 2018-12-23 RX ADMIN — SENNOSIDES AND DOCUSATE SODIUM 2 TABLET: 8.6; 5 TABLET ORAL at 19:42

## 2018-12-23 RX ADMIN — SODIUM CHLORIDE, POTASSIUM CHLORIDE, SODIUM LACTATE AND CALCIUM CHLORIDE: 600; 310; 30; 20 INJECTION, SOLUTION INTRAVENOUS at 19:35

## 2018-12-23 RX ADMIN — MAGNESIUM SULFATE HEPTAHYDRATE 2 G: 40 INJECTION, SOLUTION INTRAVENOUS at 12:16

## 2018-12-23 ASSESSMENT — ACTIVITIES OF DAILY LIVING (ADL)
ADLS_ACUITY_SCORE: 11
ADLS_ACUITY_SCORE: 12
ADLS_ACUITY_SCORE: 12
ADLS_ACUITY_SCORE: 9
IADL_COMMENTS: FAMILY ABLE TO ASSIST WITH IADLS PRN
ADLS_ACUITY_SCORE: 9
ADLS_ACUITY_SCORE: 12

## 2018-12-23 ASSESSMENT — MIFFLIN-ST. JEOR: SCORE: 1597.63

## 2018-12-23 NOTE — PLAN OF CARE
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     I/A:  General: Did very well overnight, no products needed.  Cardiac: SR-SB, HR in the 50s. No ectopy.  Resp: Clear lung sounds, minimal secretions.  GI: 200 out of OG overnight, replaced 1:1.  : Adequate UO, CVPs 12-13.  Skin: Skin tears to anterior chest most likely from tape, and one to left posterior thigh.  Musculoskeletal:   Neuro: Opening eyes to voice, limited english. Moving all extremities.    P: Extubate this am, will need . Possible transfer to floor.

## 2018-12-23 NOTE — H&P
SURGICAL ICU ADMISSION NOTE  2018      ASSESSMENT: Loy Limon is a 63 year old Ukrainian speaking male from [place] with a history of EtOH liver cirrhosis (MELD 14), hepatocellular carcinoma s/p ablation, latent TB and hypertension who is now POD#0 s/p  donor liver transplant and right inguinal hernia repair.     PLAN:   Neuro/ pain/ sedation:  #acute post operative pain  -Monitor neurological status. Notify the MD for any acute changes in exam.  -Fentanyl gtt for pain.  -Propofol gtt for sedation.  #hepatic encephalopathy  -Previously on rifaximin- discontinued post-op     Pulmonary care:   #post op respiratory support  -Doing well on ventilator, will keep intubated overnight.   -Vent support: RR 16, , PEEP 5, FiO2 40%  -Chest x-ray now to confirm ETT and line positions.     Cardiovascular:    -No acute issues. Not requiring pressor support. Monitor hemodynamic status.   -Goal MAP 60-85, CVP 8-12   #Hypertension  -Hold home nadolol for now     GI care:   -NPO except ice chips and medications.  -OG to suction. Confirm placement on abdominal XR.   #EtOH cirrhosis, MELD 14  #hepatocellular carcinoma s/p ablation  -Liver transplant U/S STAT to assess flow  -KARINE drain to bulb suction, monitor output  #inguinal hernia repair  -KARINE drain to bulb suction, monitor output      Fluids/ Electrolytes/ Nutrition:   -LR at 100 ml/hr for IV fluid hydration  -Replace electrolytes as needed.  -No indication for parenteral nutrition.     Renal/ Fluid Balance:    -Urine output is adequate so far.  -Cronin for strict I/Os.  -Will continue to monitor intake and output.  #BPH  -Home alfuzosin on hold.     Endocrine:    #Steroid induced hyperglycemia  -Continue insulin gtt. Monitor BG.      ID/ Antibiotics:  -IV zosyn, vancomycin and diflucan for willy-operative antibiotics  -PCP prophylaxis: Bactrim  -CMV prophylaxis: Valcyte  #Latent TB  -Previously on 4 month course of rifampin - on hold per transplant     Heme:      #acute blood loss anemia  #thrombocytopenia  -Stable, did not require product intra-operatively.   -Resuscitate to the following goals: hgb > 8, plt > 20, INR < 2, fibrinogen > 200  -Will monitor Q6H heme labs (CBC, INR, fibrinogen, lactate)  #Small hepatic artery anastomosis  -At risk for thrombosis: Dextran gtt x 48 hr, then transition to heparin gtt. Will start ASA tomorrow.     MSK:  -PT and OT when appropriate     Prophylaxis:    -Mechanical prophylaxis for DVT.   -Dextran gtt as above, will transition to heparin gtt POD2.  -Prilosec for GI prophylaxis.     Miscellaneous:    #immunosuppression  - Solumedrol taper for induction  - Mycophenolate 1 g BID  - Tacrolimus 2 mg BID     Lines/ tubes/ drains:  - Endotracheal tube   - MAC x 2, PAC  - Radial arterial line  - Peripheral IV x 2  - KARINE drains x 2  - Cronin catheter     Disposition:  -Surgical ICU.     CODE:  - FULL    - - - - - - - - - - - - - - - - - - - - - - - - - - - - - - - - - - - - - - - - - - - - - - - - - - - - - - - - - - - - - - - - - - - - -     PRIMARY TEAM: Liver Transplant  PRIMARY PHYSICIAN: Dr. Echevarria     REASON FOR CRITICAL CARE ADMISSION: resuscitation, ventilatory support   ADMITTING PHYSICIAN:     HISTORY PRESENTING ILLNESS: Loy Limon is a 63 year old Swedish speaking male from [place] with a history of EtOH liver cirrhosis (MELD 14), hepatocellular carcinoma s/p ablation, latent TB and hypertension who is now POD#0 s/p  donor liver transplant and right inguinal hernia repair.    He was diagnosed with liver disease in 2018 when he was found to have cirrhosis and portal hypertension. He also had a lesion concerning for HCC. He has hx of heavy EtOH abuse, and has abstained from drinking since 2018. Regarding his HCC, he underwent selective TACE on 2018 and CT guided microwave ablation on 2018, with no viable tumor identified afterwards. There was concern for pulmonary hypertension during transplant  evaluation, and he was cleared by cardiology outpatient. He has history of endoscopically diagnosed grade two esophageal varices that were not banded. No history of GI bleed. Has been on rifamixin for hepatic encephalopathy.     Operation went as planned without immediate post operative complications. Did not receive product intra-operatively and is not requiring pressor support post-operatively. Hepatic artery was noted to be small (replaced right hepatic artery used for anastomosis), and he was started on dextran gtt.     REVIEW OF SYSTEMS: unable to obtain, patient intubated and sedated.    PAST MEDICAL HISTORY:   Hepatocellular carcinoma  Liver cirrhosis   Hepatic encephalopathy  Esophageal varices  Latent TB  Hypertension  Inguinal hernia  BPH    SURGICAL HISTORY:   Appendectomy  Colonoscopy in 2018    SOCIAL HISTORY: Tobacco - Former smoker from 1970 to 3/2018. Etoh - Prior heavy use, quit 2/2018.   Born and raised in [place], moved to  about 35 years ago. Works in a factory.     FAMILY HISTORY: No bleeding/clotting disorders nor problems with anesthesia. Brother with liver disease.     ALLERGIES:    No Known Allergies    MEDICATIONS:    No current facility-administered medications on file prior to encounter.   Current Outpatient Medications on File Prior to Encounter:  alfuzosin (UROXATRAL) 10 MG 24 hr tablet Take 10 mg by mouth daily   NADOLOL PO Take 20 mg by mouth daily   omeprazole (PRILOSEC) 20 MG CR capsule Take 20 mg by mouth   rifampin (RIFADIN) 300 MG capsule Take 2 capsules (600 mg) by mouth daily   traMADol (ULTRAM) 50 MG tablet Take one po bid prn severe pain   XIFAXAN 550 MG TABS tablet TAKE ONE TABLET BY MOUTH TWO TIMES A DAY   XIFAXAN 550 MG TABS tablet Take 550 mg by mouth 2 times daily        PHYSICAL EXAMINATION:  Temp:  [97.9  F (36.6  C)-98.3  F (36.8  C)] 98  F (36.7  C)  Pulse:  [58-66] 59  Resp:  [16-18] 18  BP: (135-163)/(70-77) 145/77  SpO2:  [98 %-100 %] 100 %     General:  Intubated, sedated. No jaundice.   HEENT: Normocephalic, atraumatic. Patent nares.   Chest wall: Symmetric thorax. No masses or tenderness to palpation.   Respiratory: Non-labored breathing. Lung sounds clear to auscultation bilaterally.    Cardiovascular: Regular rate and rhythm. 2+ radial pulses bilaterally.   Gastrointestinal: Abdomen soft, non-distended. Surgical dressing over mercedes incision and right inguinal hernia repair incision- both mildly saturated with serosanguinous drainage. KARINE x 2 with serosang output (one intra-peritoneal, one in right inguinal region)  Genitourinary: Cronin in place, yellow urine. Normal external male genitalia.  Extremities: No limb deformities. No peripheral edema.  Skin: As noted above. No rashes or lesions appreciated.    LABS: Reviewed.   Arterial Blood Gases   Recent Labs   Lab 12/22/18 2010 12/22/18 1914 12/22/18 1700 12/22/18  1608   PH 7.41 7.42 7.36 7.27*   PCO2 42 39 41 43   PO2 221* 247* 184* 238*   HCO3 26 25 23 20*     Complete Blood Count   Recent Labs   Lab 12/22/18 2010 12/22/18 1914 12/22/18 1700 12/22/18  1608  12/22/18  0013   WBC  --   --   --   --   --  3.1*   HGB 9.5* 9.1* 9.5* 9.5*   < > 12.0*   PLT  --   --   --   --   --  75*    < > = values in this interval not displayed.     Basic Metabolic Panel  Recent Labs   Lab 12/22/18 2010 12/22/18 1914 12/22/18 1700 12/22/18  1608  12/22/18  0013    139 138 139   < > 139   POTASSIUM 3.5 3.6 3.6 3.4   < > 4.5   CHLORIDE  --   --   --   --   --  109   CO2  --   --   --   --   --  26   BUN  --   --   --   --   --  10   CR  --   --   --   --   --  0.50*   * 201* 256* 250*   < > 167*    < > = values in this interval not displayed.     Liver Function Tests  Recent Labs   Lab 12/22/18  0013   AST 48*   ALT 40   ALKPHOS 408*   BILITOTAL 1.7*   ALBUMIN 2.5*   INR 1.77*     Pancreatic Enzymes  Recent Labs   Lab 12/22/18  0013   AMYLASE 67     Coagulation Profile  Recent Labs   Lab 12/22/18  0013   INR  1.77*   PTT 38*     Lactate  Invalid input(s): LACTATE    IMAGING:  Results for orders placed or performed during the hospital encounter of 12/21/18   XR Chest 2 Views    Narrative    Exam: XR CHEST 2 VW, 12/22/2018 1:16 AM    Indication: liver transplant admit    Comparison: CT chest 10/3/2018, chest x-ray 6/18/2018    Findings:   PA and lateral views of the chest. The trachea is midline. The  cardiomediastinal silhouette is normal in size. The pulmonary  vasculature is within normal limits. No pneumothorax. No pleural  effusion. Calcified granuloma in the left lower lung. Lungs are  otherwise clear. No acute bony abnormalities. The visualized upper  abdomen appears unremarkable.       Impression    Impression:No acute findings.    I have personally reviewed the examination and initial interpretation  and I agree with the findings.    AUGUSTO CHEATHAM MD       Patient seen, findings and plan discussed with surgical ICU staff Dr. Wood.    ------------------------------------  Ninfa Brody MD  General Surgery PGY-3

## 2018-12-23 NOTE — ANESTHESIA PROCEDURE NOTES
PA Catheter Insertion Note  Anesthesiologist: Feliciano Agudelo MD  Resident:  Karen Costa MD   PA Catheter placed by resident/CRNA in the presence of a teaching physician.  Introducer: Introducer placed as part of procedure (SEE separate note)   Skin prep: Cap, Full body drape, hand hygiene, Mask, Sterile gown and Sterile gloves    PA Catheter type:  CCO    Distance catheter advanced:  49 cm.    Appropriate RA, RV, PA  waveforms?: Yes        Complications:  None apparent        PA catheter notes:  Introducer already in place, placed by ANES team (see separate note). PA cath floated without issue.

## 2018-12-23 NOTE — PROGRESS NOTES
Preliminary positive donor sputum culture results have been uploaded to [employer].  Donor ID [id number].  Dr. Agudelo notified of results.

## 2018-12-23 NOTE — PROGRESS NOTES
"CLINICAL NUTRITION SERVICES - BRIEF NOTE  Consult received for \"assess/order TF\"     Nutrition Prescription    RECOMMENDATIONS FOR MDs/PROVIDERS TO ORDER:  If FT desired, please order XR feeding tube placement for radiology to place.  RD unable to place on the weekend.      Future/Additional Recommendations:  Tube Feeding Recommendations:  --Begin Nutren 1.5 @ 15 mL/hr and adv by 10 mL q 8 hrs if tolerated and/or per medical team discretion, pending GI status towards initial goal of 65 mL/hr to provide 2340 kcals (33 kcal/kg/day), 106 g PRO (1.5 g/kg/day), 1186 mL H2O, 275 g CHO and no fiber daily.  --Order 1 pkt ProSource TF BID.  --Order free water flushes 30 mL q 4 hrs for FT patency.  --Order Certavite to ensure adequate micronutrients in case of slow TF adv, EN interruptions, or hypermetabolic needs.  --Only adv TF if K/Mg WNL and Phos >1.9 upon daily AM lab draw.     Upon diet advancement > CL, please order oral supplements and calorie counts.       EVALUATION OF THE PROGRESS TOWARD GOALS   Diet: NPO      NEW FINDINGS   Patient has an orogastric tube, no other FT present.  Noted plan for extubation this morning.     Monitoring/Evaluation  Progress toward goals will be monitored and evaluated per protocol.    Tahira Pierce MS, RD, LD  Pager [phone]    "

## 2018-12-23 NOTE — ANESTHESIA PROCEDURE NOTES
RASHAUN Probe Insertion Note:    Inserted by:  Feliciano Agudelo MD (Responsible Anesthesiologist)    Probe Status PRE Insertion: NO obvious damage  Probe type:  Adult 2D    Bite block used:   Yes  Insertion Technique: Jaw Lift  Insertion complications: None obvious    Billing Report:RASHAUN report by Anesthesiologist (See Separate Report note)    Probe Status POST Removal: NO obvious damage    Comments: For intraoperative diagnostic purposes. No report will be generated.

## 2018-12-23 NOTE — PROGRESS NOTES
12/23/18 1400   Quick Adds   Type of Visit Initial Occupational Therapy Evaluation   Living Environment   Lives With spouse   Living Arrangements apartment   Home Accessibility stairs to enter home   Living Environment Comment Pt lives in a 2nd floor apartment with his wife; typically uses the stairs to access his apartment (reports there are 6 stairs with handrail) though an elevator is present in the building prn   Self-Care   Usual Activity Tolerance good   Current Activity Tolerance fair   Equipment Currently Used at Home none   Activity/Exercise/Self-Care Comment Pt reports that he is independent with all basic mobility and self cares at baseline.  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   Functional Level   Ambulation 0-->independent   Transferring 0-->independent   Toileting 0-->independent   Bathing 0-->independent   Dressing 0-->independent   Eating 0-->independent   Cognition 0 - no cognition issues reported   Fall history within last six months no   Which of the above functional risks had a recent onset or change? ambulation;transferring;toileting;bathing;dressing       Present yes   Language [place]   General Information   Onset of Illness/Injury or Date of Surgery - Date 12/21/18   Referring Physician Sebastián Agudelo MD   Patient/Family Goals Statement Return to home   Additional Occupational Profile Info/Pertinent History of Current Problem pt is a 64 yo M hx Laennec's cirrhosis with HCC (treated), MELD 14. S/p OLT (52 yo M donor import DBD) with right inguinal hernia repair with biologic mesh 12/22/18.    Precautions/Limitations fall precautions;abdominal precautions   General Observations Pt pleasant and agreeable; family present and supportive   General Info Comments Activity: up with assist   Cognitive Status Examination   Orientation orientation to person, place and time   Level of Consciousness alert   Follows Commands  (Cognition) WNL   Memory intact   Cognitive Comment no acute cognitive concerns noted during session; pt oriented and appropriately conversational   Visual Perception   Visual Perception Comments pt denies acute visual changes   Sensory Examination   Sensory Comments intermittent numbness/tingling in fingertips per baseline   Pain Assessment   Patient Currently in Pain (yes; incisional pain)   Integumentary/Edema   Integumentary/Edema Comments mild edema in extremities; abdomen slightly distended   Range of Motion (ROM)   ROM Comment BUE WFL; mild deficits in L shoulder ROM - pt c/o L shoulder pain which he attributes to positioning and inactivity - will monitor   Strength   Strength Comments not formally assessed 2/2 post surgical precautions; demonstrates good functional strength in all extremities   Mobility   Bed Mobility Bed mobility skill: Supine to sit   Bed Mobility Skill: Supine to Sit   Level of [place]: Supine/Sit moderate assist (50% patients effort)   Physical Assist/Nonphysical Assist: Supine/Sit 1 person + 1 person to manage equipment   Transfer Skill: Bed to Chair/Chair to Bed   Level of [place]: Bed to Chair minimum assist (75% patients effort)   Physical Assist/Nonphysical Assist: Bed to Chair 1 person + 1 person to manage equipment   Transfer Skill: Sit to Stand   Level of [place]: Sit/Stand minimum assist (75% patients effort)   Physical Assist/Nonphysical Assist: Sit/Stand 1 person + 1 person to manage equipment   Balance   Balance Comments no LOB with bedside transfers; will monitor   Lower Body Dressing   Level of [place]: Dress Lower Body maximum assist (25% patients effort)   Instrumental Activities of Daily Living (IADL)   IADL Comments family able to assist with IADLs prn   Activities of Daily Living Analysis   Impairments Contributing to Impaired Activities of Daily Living pain;post surgical precautions;strength decreased   General Therapy Interventions   Planned  Therapy Interventions ADL retraining;IADL retraining;bed mobility training;ROM;strengthening;transfer training   Clinical Impression   Criteria for Skilled Therapeutic Interventions Met yes, treatment indicated   OT Diagnosis decreased activity tolerance and independence with ADLs   Influenced by the following impairments pain, post surgical precautions, decreased activity tolerance, acute medical needs   Assessment of Occupational Performance 5 or more Performance Deficits   Identified Performance Deficits bed mobility, transfers, toileting, dressing, bathing, mobility   Clinical Decision Making (Complexity) Low complexity   Therapy Frequency 5 times/wk   Predicted Duration of Therapy Intervention (days/wks) 12/29/18   Anticipated Discharge Disposition Home with Assist   Risks and Benefits of Treatment have been explained. Yes   Patient, Family & other staff in agreement with plan of care Yes   Total Evaluation Time   Total Evaluation Time (Minutes) 5

## 2018-12-23 NOTE — PHARMACY-TRANSPLANT NOTE
Adult Liver Transplant Post Operative Note    63 year old male  s/p  donor liver transplant on 18 for Alcoholic liver disease and hepatocellular carcinoma.      Planned immunosuppression regimen to include:   INDUCTION with: methylprednisolone/prednisone taper through POD #5.  MAINTENANCE to include mycophenolate and tacrolimus with initial goal trough levels of 10-15 mcg/L for 0-3 months post-transplant.  Patient may continue prednisone at 5 mg PO daily until tacrolimus level is therapeutic.     Surgical prophylaxis includes: vancomycin, piperacillin-tazobactam and fluconazole IV for 48 hours.    Opportunistic pathogen prophylaxis includes: trimethoprim/sulfamethoxazole, valganciclovir, and clotrimazole once systemic antifungal therapy with fluconazole is complete.    Patient is not enrolled in medication study.    Pharmacy will monitor for medication interactions and immunosuppression levels in conjunction with the team. Medication therapy needs for discharge planning will continue to be addressed throughout the current admission via multidisciplinary rounds and order review.  Pharmacy will make recommendations as appropriate.    Brigitte Joe, PharmD

## 2018-12-23 NOTE — PROCEDURES
[facility] Procedure Note:  DATE: December 23, 2018  PROCEDURE: re-wiring of right Cordis cathether to triple-lumen catheter  SURGEON: Alton Deleon MD; Staff - Kamla Chacon MD  FINDINGS: none  COMPLICATIONS: none  OPERATIVE PROCEDURE:  The patient was located in the ICU. The patient was supine and placed in a reverse-trendelenburg position appropriate for central line placement. The patient s right neck and chest was prepped and draped in sterile fashion. A guide-wire was placed through the right Cordis catheter, and the Cordis catheter was then removed. Dark venous bleeding was encountered, as expected. A triple-lumen catheter was then placed over the guide-wire to 16 cc. The catheter was threaded smoothly over the guide wire and appropriate blood return was obtained. The lumen of the catheter was evacuated of air and flushed with sterile saline. The catheter was then sutured in place to the skin and a sterile dressing applied.  All ports flushed easily.   EBL: 10cc  A post-procedure CXR was ordered to confirm placement of catheter.    Alton Deleon MD  Neurosurgery Resident PGY1

## 2018-12-23 NOTE — PLAN OF CARE
OT 4AB: Evaluation complete and treatment initiated.  Discharge Planner OT   Patient plan for discharge: not directly discussed  Current status: Pt is alert, appropriately conversational, receptive to education on post surgical precautions.  Mod A supine to EOB, Min A sit to stand, CGA with hand hold assist to takes steps to transfer to bedside chair.  VSS with activity on 4L o2 via nc  Barriers to return to prior living situation: acute medical needs, post surgical precautions, decreased activity tolerance  Recommendations for discharge: Anticipate discharge to home with assist   Rationale for recommendations: pending continued progress with therapies anticipate pt will be able to return to home as pt has good family support, was previously independent and demonstrates good strength with initial active mobility post op.       Entered by: Sweetie Rosenbaum 12/23/2018 3:02 PM

## 2018-12-23 NOTE — PROGRESS NOTES
SURGICAL ICU PROGRESS NOTE  2018      ASSESSMENT: Loy Limon is a 63 year old [place] speaking male from [place] with a history of EtOH liver cirrhosis (MELD 14), hepatocellular carcinoma s/p ablation, latent TB and hypertension who is s/p  donor liver transplant and right inguinal hernia repair . Doing well, transfer to floor .     Plan/Changes for today:  - PST, extubated  - Remove Sadorus-Olga catheter, MAC line x1, art line, re-wired Cordis to triple-lumen catheter  - Decreased Hb checks q6 -> q8 hrs  - Discontinue propofol, fentanyl -> prn oxycodone, dilaudid  - Clear liquid diet  - Continue dextran, vanc, fluconazole per transplant team  - Start ASA  - Transfer to floor    PLAN:   Neuro/ pain/ sedation:  #acute post operative pain  -Monitor neurological status. Notify the MD for any acute changes in exam.  -Discontinue propofol, fentanyl -> prn oxycodone, dilaudid  #hepatic encephalopathy  -Previously on rifaximin- discontinued post-op     Pulmonary care:   #post op respiratory support, extubated   - PST, Extubated   - Supplemental O2 prn, pulmonary toilet     Cardiovascular:    -No acute issues. Not requiring pressor support. Monitor hemodynamic status.   -Goal MAP 60-85, CVP 8-12   #Hypertension  -Hold home nadolol for now     GI care:   -Clear liquid diet  #EtOH cirrhosis, MELD 14  #hepatocellular carcinoma s/p ablation  -Liver transplant U/S with patent dopplers  -KARINE drain to bulb suction, monitor output  #inguinal hernia repair  -KARINE drain to bulb suction, monitor output      Fluids/ Electrolytes/ Nutrition:   -LR at 100 ml/hr for IV fluid hydration. Stop when adequate oral intake.  -Replace electrolytes as needed.  -No indication for parenteral nutrition.     Renal/ Fluid Balance:    -Urine output is adequate so far.  -Cronin for strict I/Os.  -Will continue to monitor intake and output.  #BPH  -Home alfuzosin on hold.     Endocrine:    #Steroid induced  hyperglycemia  -Continue insulin gtt. Monitor BG.      ID/ Antibiotics:  -IV zosyn, vancomycin and diflucan for wlily-operative antibiotics. Will continue for now per transplant team.  -PCP prophylaxis: Bactrim  -CMV prophylaxis: Valcyte  #Latent TB  -Previously on 4 month course of rifampin - on hold per transplant  #Immunosupression  - Solumedrol taper for induction  - Mycophenolate 1 g BID  - Tacrolimus 2 mg BID     Heme:     #acute blood loss anemia, stable  #thrombocytopenia  - Hb stable  -Resuscitate to the following goals: hgb > 8, plt > 20, INR < 2, fibrinogen > 200  -Will monitor Q8H heme labs (CBC, INR, fibrinogen, lactate)  #Small hepatic artery anastomosis  -At risk for thrombosis: Dextran gtt x 48 hr, then transition to heparin gtt. Started ASA     MSK:  -PT, OT     Prophylaxis:    -Mechanical prophylaxis for DVT.   -Dextran gtt as above, will transition to heparin gtt POD2.  -Prilosec for GI prophylaxis.     Lines/ tubes/ drains:  - Rewired Cordis to triple-lumen 12/23  - Radial arterial line (remove today)  - Peripheral IV x 2  - KARINE drains x 2  - Cronin catheter     Disposition:  - Transfer to floor    CODE:  - FULL    - - - - - - - - - - - - - - - - - - - - - - - - - - - - - - - - - - - - - - - - - - - - - - - - - - - - - - - - - - - - - - - - - - - - -     PHYSICAL EXAMINATION:  Temp:  [98.8  F (37.1  C)-99.1  F (37.3  C)] 99.1  F (37.3  C)  Heart Rate:  [53-77] 77  Resp:  [16] 16  BP: (133-150)/(68-73) 133/73  MAP:  [58 mmHg-98 mmHg] 67 mmHg  Arterial Line BP: (101-150)/(36-70) 101/36  FiO2 (%):  [5 %-40 %] 5 %  SpO2:  [94 %-98 %] 95 %     General: Extubated, sedated. No jaundice.   HEENT: Normocephalic, atraumatic. Patent nares.   Chest wall: Symmetric thorax. No masses or tenderness to palpation.   Respiratory: Breathing comfortably on nasal cannula   Cardiovascular: Regular rate and rhythm. 2+ radial pulses bilaterally.   Gastrointestinal: Abdomen soft, non-distended. Kym incision and right  inguinal hernia repair incision c/d/i. KARINE x 2 with serosang output (one intra-peritoneal, one in right inguinal region)  Genitourinary: Cronin in place, yellow urine. Normal external male genitalia.  Extremities: No limb deformities. No peripheral edema.  Skin: As noted above. No rashes or lesions appreciated.    LABS: Reviewed.   Arterial Blood Gases   Recent Labs   Lab 12/23/18 0404 12/22/18 2126 12/22/18 2010 12/22/18 1914   PH 7.41 7.43 7.41 7.42   PCO2 41 41 42 39   PO2 71* 92 221* 247*   HCO3 26 27 26 25     Complete Blood Count   Recent Labs   Lab 12/23/18 0404 12/22/18 2121 12/22/18 2010 12/22/18 1914 12/22/18  0013   WBC 7.2 8.0  --   --   --  3.1*   HGB 8.7* 9.3* 9.5* 9.1*   < > 12.0*   PLT 50* 66*  --   --   --  75*    < > = values in this interval not displayed.     Basic Metabolic Panel  Recent Labs   Lab 12/23/18 0404 12/22/18 2121 12/22/18 2010 12/22/18 1914 12/22/18  0013    143 139 139   < > 139   POTASSIUM 3.6 3.7 3.5 3.6   < > 4.5   CHLORIDE 112* 109  --   --   --  109   CO2 24 26  --   --   --  26   BUN 18 16  --   --   --  10   CR 0.66 0.65*  --   --   --  0.50*   * 157* 172* 201*   < > 167*    < > = values in this interval not displayed.     Liver Function Tests  Recent Labs   Lab 12/23/18 0404 12/22/18 2121 12/22/18  0013   * 658* 48*   * 518* 40   ALKPHOS 166* 203* 408*   BILITOTAL 1.7* 3.0* 1.7*   ALBUMIN 1.6* 1.7* 2.5*   INR 1.61* 2.04* 1.77*     Pancreatic Enzymes  Recent Labs   Lab 12/23/18 0404 12/22/18  0013   LIPASE 93  --    AMYLASE 46 67     Coagulation Profile  Recent Labs   Lab 12/23/18  0404 12/22/18  2121 12/22/18  0013   INR 1.61* 2.04* 1.77*   PTT  --  40* 38*     Lactate  Invalid input(s): LACTATE    IMAGING:  Results for orders placed or performed during the hospital encounter of 12/21/18   XR Chest 2 Views    Narrative    Exam: XR CHEST 2 VW, 12/22/2018 1:16 AM    Indication: liver transplant admit    Comparison: CT chest 10/3/2018,  chest x-ray 6/18/2018    Findings:   PA and lateral views of the chest. The trachea is midline. The  cardiomediastinal silhouette is normal in size. The pulmonary  vasculature is within normal limits. No pneumothorax. No pleural  effusion. Calcified granuloma in the left lower lung. Lungs are  otherwise clear. No acute bony abnormalities. The visualized upper  abdomen appears unremarkable.       Impression    Impression:No acute findings.    I have personally reviewed the examination and initial interpretation  and I agree with the findings.    AUGUSTO CHEATHAM MD       Patient seen, findings and plan discussed with surgical ICU staff Dr. Chacon.    ------------------------------------  Alton Deleon MD  Neurosurgery Resident PGY1

## 2018-12-23 NOTE — PROGRESS NOTES
Pt extubated at this time, placed on NC 4 lpm, sats 96%. Pt has good nonproductive cough. Appears comfortable, BS clear throughout.

## 2018-12-23 NOTE — PROGRESS NOTES
Immunosuppression Note:    Loy Limon is a 63 year old male who is seen today  for immunosuppression management     IEmil MD, I have examined the patient with the resident/PA/Fellow, discussed and agree with the note and findings.  I have reviewed today's vital signs, medications, labs and imaging. I reviewed the immunosuppression medications and levels. I spoke to the patient/family and explained below clinical details and answered all the questions      Transplant Surgery  Inpatient Daily Progress Note  12/23/2018    Assessment & Plan: 62 yo M hx Laennec's cirrhosis with HCC (treated), MELD 14. S/p OLT (54 yo M donor import DBD) with right inguinal hernia repair with biologic mesh 12/22/18. Notable for small arterial anastomosis (donor common hepatic with atherosclerosis to recipient replaced right hepatic)    Graft function:good, stable. T Bili 1.7, mild stable transaminitis (, ). INR 1.6, no FFP needed. Acidosis corrected prior to leaving OR.   Immunosuppression management: No change cellcept 1000 BID, steroid taper, tacrolimus- goal level 5 .  Complexity of management:High. Contributing factors: anemia  Hematology: Hgb stable, 8.7. HDS. No transfusion intra/postop  -Hepatic artery anticoagulation: dextran X48 hrs, aspirin 325 starting today. Will need heparin and warfarin long term  Cardiorespiratory: Stable- extubated. Very hypertensive on reperfusion but overall improved. No pressors   GI/Nutrition: clears advance as tolerated. Awaiting return of bowel function. Monitor sarah counts- may need feeding tube due to low albumin preop   Endocrine: insulin drip while on steroids  Fluid/Electrolytes: MIVF: continue crystalloid at 100/hr, wean as PO increases.  : continue cota for I/O monitoring  Infectious disease: continue abx (vanc/zosyn/diflucan) given herniorraphy with mesh  Prophylaxis: DVT, fall, GI, fungal  Disposition: transfer to . Needs PT/OT     Medical Decision  Making: High  Admit [id number] (high level decision making)    IRVIN/Fellow/Resident Provider: Sebastián Agudelo     Faculty: Emil Echevarria M.D.    __________________________________________________________________  Transplant History: Admitted 12/21/2018 for DDLT and RIH repair (XenMatrix mesh).   The patient has a history of liver failure due to Laennec's cirrhosis with HCC.    12/22/2018 (Liver), Postoperative day: 1     Interval History: History is obtained from the patient  Overnight events: S/p DDLT and hernia repair yesterday. Tolerated procedures well. Extubated this morning. Pain controlled, nonlabored respirations. Taking some PO. No other complaints.     ROS:   A 10-point review of systems was negative except as noted above.    Curent Meds:    aspirin  325 mg Oral Daily     fluconazole  200 mg Intravenous Q24H     [START ON 12/24/2018] methylPREDNISolone  100 mg Intravenous Once    Followed by     [START ON 12/25/2018] methylPREDNISolone  50 mg Intravenous Once    Followed by     [START ON 12/26/2018] predniSONE  25 mg Oral Once    Followed by     [START ON 12/27/2018] predniSONE  10 mg Oral or Feeding Tube Once     mycophenolate  1,000 mg Oral BID IS    Or     mycophenolate  1,000 mg Oral or NG Tube BID IS     omeprazole  20 mg Oral QAM AC     piperacillin-tazobactam  3.375 g Intravenous Q6H     polyethylene glycol  17 g Oral Daily     senna-docusate  2 tablet Oral or Feeding Tube BID     sodium chloride (PF)  3 mL Intravenous Q8H     sulfamethoxazole-trimethoprim  1 tablet Oral or Feeding Tube Daily     tacrolimus  2 mg Oral BID IS    Or     tacrolimus  2 mg Oral or NG Tube BID IS     valGANciclovir  450 mg Oral Daily    Or     valGANciclovir  450 mg Oral or NG Tube Daily     vancomycin (VANCOCIN) IV  1,500 mg Intravenous Q12H       Physical Exam:     Admit Weight: 84.6 kg (186 lb 9.6 oz)    Current Vitals:   /73   Pulse 59   Temp 99.1  F (37.3  C) (Pulmonary Artery)   Resp 16   Ht 1.702 m  "(5' 7\")   Wt 85.4 kg (188 lb 4.4 oz)   SpO2 95%   BMI 29.49 kg/m      CVP (mmHg): 13 mmHg    Vital sign ranges:    Temp:  [98.8  F (37.1  C)-99.1  F (37.3  C)] 99.1  F (37.3  C)  Heart Rate:  [53-77] 77  Resp:  [16] 16  BP: (133-150)/(68-73) 133/73  MAP:  [58 mmHg-98 mmHg] 67 mmHg  Arterial Line BP: (101-150)/(36-70) 101/36  FiO2 (%):  [5 %-40 %] 5 %  SpO2:  [94 %-98 %] 95 %  Patient Vitals for the past 24 hrs:   BP Temp Temp src Heart Rate Resp SpO2 Weight   12/23/18 1000 -- -- -- 77 -- 95 % --   12/23/18 0900 -- -- -- 60 16 94 % --   12/23/18 0817 -- -- -- 60 -- 96 % --   12/23/18 0800 -- 99.1  F (37.3  C) Pulm Art 53 16 94 % --   12/23/18 0700 -- -- -- 56 16 95 % --   12/23/18 0600 -- -- -- 54 16 94 % --   12/23/18 0500 -- -- -- 54 16 94 % --   12/23/18 0400 -- 98.8  F (37.1  C) Pulm Art 56 16 94 % 85.4 kg (188 lb 4.4 oz)   12/23/18 0300 -- -- -- 56 16 95 % --   12/23/18 0200 -- -- -- 56 16 95 % --   12/23/18 0100 -- -- -- 57 16 95 % --   12/23/18 0000 -- 98.8  F (37.1  C) Pulm Art 60 16 96 % --   12/22/18 2300 -- -- -- 58 16 97 % --   12/22/18 2230 -- -- -- 59 16 98 % --   12/22/18 2200 -- -- -- 57 16 97 % --   12/22/18 2130 -- -- -- 63 16 96 % --   12/22/18 2115 -- -- -- 68 16 97 % --   12/22/18 2100 133/73 -- -- 69 16 97 % --   12/22/18 2045 150/68 98.8  F (37.1  C) Pulm Art 67 16 97 % --     General Appearance: in no apparent distress.   Skin: normal, warm, dry, No rashes, induration, or jaundice  Heart: regular rate and rhythm  Lungs: without wheezes, clear to ausculattion  Abdomen: abd soft, slightly distended. Incision c/d/i, staples in place. KARINE X2- serosang. 22mL from inguinal drain, 65 from abdominal.   : The genitalia are edematous with some swelling in right inguinal hernia repair site  Extremities: edema: present bilaterally. 2+. There is no skin breakdown.  Neurologic: awake, alert and oriented. Tremor absent.. Asterixis: absent.    Frailty Scores     Frailty Scores 5/29/2018    Final Score Not " Frail    Final Score Number 0          Data:   CMP  Recent Labs   Lab 12/23/18 0404 12/22/18 2121 12/22/18 2010 12/22/18  0013    143 139   < > 139   POTASSIUM 3.6 3.7 3.5   < > 4.5   CHLORIDE 112* 109  --   --  109   CO2 24 26  --   --  26   * 157* 172*   < > 167*   BUN 18 16  --   --  10   CR 0.66 0.65*  --   --  0.50*   GFRESTIMATED >90 >90  --   --  >90   GFRESTBLACK >90 >90  --   --  >90   YELENA 6.9* 7.4*  --   --  7.7*   ICAW 4.5  --  4.8   < >  --    MAG 1.7  --   --   --  2.0   PHOS 4.4  --   --   --  2.8   AMYLASE 46  --   --   --  67   LIPASE 93  --   --   --   --    ALBUMIN 1.6* 1.7*  --   --  2.5*   BILITOTAL 1.7* 3.0*  --   --  1.7*   ALKPHOS 166* 203*  --   --  408*   * 658*  --   --  48*   * 518*  --   --  40    < > = values in this interval not displayed.     CBC  Recent Labs   Lab 12/23/18 0404 12/22/18 2121   HGB 8.7* 9.3*   WBC 7.2 8.0   PLT 50* 66*   A1C  --  5.8*     COAGS  Recent Labs   Lab 12/23/18 0404 12/22/18 2121 12/22/18  0013   INR 1.61* 2.04* 1.77*   PTT  --  40* 38*      Urinalysis  Recent Labs   Lab Test 12/22/18  0034 11/07/18  0930  07/19/18  1133   COLOR Yellow Yellow   < > Yellow   APPEARANCE Clear Clear   < > Clear   URINEGLC Negative Negative   < > Negative   URINEBILI Negative Negative   < > Negative   URINEKETONE Negative Negative   < > Negative   SG 1.012 1.010   < > 1.009   UBLD Trace* Negative   < > Negative   URINEPH 6.5 7.0   < > 8.0*   PROTEIN Negative Negative   < > Negative   NITRITE Negative Negative   < > Negative   LEUKEST Negative Negative   < > Negative   RBCU 3* 1   < >  --    WBCU 1 1   < >  --    UTPG  --   --   --  Unable to calculate due to low value    < > = values in this interval not displayed.     Virology:  No results found for: CSPEC, CMVPCR, CMQNT, CMVQ, EBVDN, CMVM, CMIG, CMVG, CMIGG, CMLTX, EBVIGG, EBIGG, EBVAGN, HCVAB, HBSAB, BKRES

## 2018-12-23 NOTE — ANESTHESIA POSTPROCEDURE EVALUATION
Anesthesia POST Procedure Evaluation    Patient: Loy Limon   MRN:     [id number] Gender:   male   Age:    63 year old :      1955        Preoperative Diagnosis: cirrhosis of liver   Procedure(s):  TRANSPLANT LIVER RECIPIENT  DONOR  Herniorrhaphy inguinal   Postop Comments: No value filed.       Anesthesia Type:  General    Reportable Event: NO     PAIN:        Airway/Resp.:   Sign Out Status: Airway Device present     Disposition:        Comments/Narrative:  Eval in ICU.    Unable to evaluate for pain and nausea due to intubation, sedation.    VSS, no pressors.    Will remain in ICU for further monitoring and management.             Last Anesthesia Record Vitals:  CRNA VITALS  2018 - 2018             Pulse:  68    ART BP:  138/50    ART Mean:  75    Ht Rate:  67    Temp:  37.8  C (100  F)    SpO2:  99 %    Resp Rate (observed):  15    Resp Rate (set):  15          Last PACU/Preop Vitals:  Vitals:    18 2304 18 0439 18 0736   BP: 163/70 135/74 145/77   Pulse: 66 58 59   Resp: 16 16 18   Temp: 36.8  C (98.3  F) 36.6  C (97.9  F) 36.7  C (98  F)   SpO2: 98% 99% 100%         Electronically Signed By: Feliciano Agudelo MD, 2018, 9:09 PM

## 2018-12-23 NOTE — BRIEF OP NOTE
[facility], [place]    Brief Operative Note    Pre-operative diagnosis: cirrhosis of liver  Post-operative diagnosis * No post-op diagnosis entered *  Procedure: Procedure(s):  TRANSPLANT LIVER RECIPIENT  DONOR  Herniorrhaphy inguinal  Surgeon: Surgeon(s) and Role:     * Emil Echevarria MD - Primary     * Yasir Sharif MD - Assisting     * Sebastián Agudelo MD - Assisting     * Barbara Jackson MD - Resident - Assisting  Anesthesia: General   Estimated blood loss: 500 mL  Drains: Johna-Jett X2- RUQ behind gabrielle, RLQ in inguinal hernia space    Specimens:   ID Type Source Tests Collected by Time Destination   1 : ascites Fluid Abdomen ANAEROBIC BACTERIAL CULTURE, FLUID CULTURE AEROBIC BACTERIAL, FUNGUS CULTURE, GRAM STAIN Emil Echevarria MD 2018 11:58 AM    A : portal vein thrombus Tissue Abdomen SURGICAL PATHOLOGY EXAM Emil Echevarria MD 2018 12:44 PM    B : Native liver and gallbladder Organ Liver SURGICAL PATHOLOGY EXAM Emil Echevarria MD 2018  2:32 PM    C : Donor Gall Bladder Tissue Gallbladder and Contents SURGICAL PATHOLOGY EXAM Emil Echevarria MD 2018  3:20 PM    D : hernia sac  Tissue Hernia Sac SURGICAL PATHOLOGY EXAM Emil Echevarria MD 2018  6:54 PM      Findings:    .  1) recipient with replaced right hepatic artery. Donor artery calcified. Performed arterial anastomosis with replaced right to donor common hepatic due to low flow in native 'common/left' hepatic. Peak flow 270mL/min  2) trace ascites- sent for culture  3) small portal vein thrombus- thrombectomized  4) right inguinal hernia- pantaloon type. Preperitoneal fat within sac excised and reduced. Fredo repair using XenMatrix mesh    Complications: None.  Implants: None.

## 2018-12-23 NOTE — PROGRESS NOTES
Pt arrived to floor around 2045 direct from OR. Pt did very well during the case, no blood products given. Will settle and await orders. Spouse and daughter were able to speak with the MD, daughter did translate for pt's spouse. Pt will need  in the morning for extubation. Belongings in patients room, spouse will stay overnight.

## 2018-12-23 NOTE — PHARMACY-VANCOMYCIN DOSING SERVICE
Pharmacy Vancomycin Initial Note  Date of Service 2018  Patient's  1955  63 year old, male    Indication: [place]-op ppx    Current estimated CrCl = Estimated Creatinine Clearance: 153.1 mL/min (A) (based on SCr of 0.5 mg/dL (L)).    Creatinine for last 3 days  2018: 12:13 AM Creatinine 0.50 mg/dL    Recent Vancomycin Level(s) for last 3 days  No results found for requested labs within last 72 hours.      Vancomycin IV Administrations (past 72 hours)                   vancomycin (VANCOCIN) 1000 mg in dextrose 5% 200 mL PREMIX (mg) 1,000 mg Given 18 1206                Nephrotoxins and other renal medications (From now, onward)    Start     Dose/Rate Route Frequency Ordered Stop    18 0130  piperacillin-tazobactam (ZOSYN) 3.375 g vial to attach to  mL bag      3.375 g  over 30 Minutes Intravenous EVERY 6 HOURS 18  vancomycin (VANCOCIN) 1,500 mg in sodium chloride 0.9 % 250 mL intermittent infusion      1,500 mg  over 90 Minutes Intravenous EVERY 12 HOURS 18  tacrolimus (PROGRAF BRAND) capsule 2 mg      2 mg Oral 2 TIMES DAILY. 18  tacrolimus (PROGRAF BRAND) suspension 2 mg      2 mg Oral or NG Tube 2 TIMES DAILY. 18            Contrast Orders - past 72 hours (72h ago, onward)    None                Plan:  1.  Start vancomycin  1500 mg IV q12h. Received 1000 mg x 1 during procedure  2.  Goal Trough Level: 15-20 mg/L   3.  Pharmacy will check trough levels as appropriate in 1-3 Days.    4. Serum creatinine levels will be ordered daily for the first week of therapy and at least twice weekly for subsequent weeks.    5. [place] method utilized to dose vancomycin therapy: Method 2    Penny Lorenzana, BrandenD

## 2018-12-23 NOTE — TELEPHONE ENCOUNTER
Organ Offer Encounter Information    Organ Offer Information  Organ offer date & time:  12/21/2018  9:04 PM  Coordinator/Fellow/Attending name:  Joyce Weber, RN   Organ(s):   Organ UNOS ID Match Run ID Comment Organ Laterality   Liver [id number] [facility]       Recent infections?:  No    New medications?:  No Recent pregnancy?:  No (Comment: NA)   Angicoagulation medications?:  No Recent vaccinations?:  No   Recent blood transfusions?:  No Recent hospitalizations?:  No   Has your insurance changed in the last 6-12 months?:  Neg    Discussed organ offer with:  Patient  Patient/Caregiver name:  Via    Discussed risk category with Patient/Other:  N/A  Understood donor criteria, verbalized understanding  Patient/Other asked to speak to a surgeon?:  No  Discussed program-specific outcomes:  Did not have questions regarding SRTR  Right to decline organ offer without penalty, Patient/Other:  Aware of option to decline without penalty  Organ offer decision status Patient/Other:  Accepted Offer  Organ disposition:  Transplanted  Additional Comments:  12/21/2018 9:59 PM  Liver: Primary  MD: Jammie/Swathi  OPO Contact: Uma at [facility] [phone]  Plan (NPO, Donor OR): Called patient via  (#[phone]). Discussed offer with patient. Patient mentioned he has upcoming hernia repair and had concerns on pushing that out. Also mentioned he may not be financially ready--informed him he could discuss with social work during admission stay. Patient said he wanted 20 minutes to discuss offer with wife--plan on calling him back then.    12/21/2018 10:01 PM:  Called patient back via  (#[phone]). Informed him Dr. Echevarria would like to speak to patient in person and wants him admitted. Patient can be there in 1 hour and will plan on coming in. Provided admission instructions.  Joyce Weber  Transplant Coordinator    Admissions: Gina 2208  Unit: Nayeli 2212--informed final XM needs to be  drawn  Update Provider Entering Orders:  THERESE WALKER [ Msg Id [id number] ] paged 2232--call back 2240. Informed final XM needs to be drawn.  Immunology: Azra 2243  OR: Latoya 2314--booked for 0900  Blood Bank: 2320  Research: Not consented  TransNet/ABO Verification: Done 2311  Add Organ: Done 2312    SRC On Site: [place] [phone]  Pickup: 0240- Dr. Sharif and Intern Dr. Jackson  OR set 0430  Tail # [id number]  [facility]    Attestation I have discussed all of the above with the Patient/Legal Guardian/Caregiver regarding this organ offer.:  Yes  Coordinator/Fellow/Attending name:  Joyce Weber RN

## 2018-12-23 NOTE — OP NOTE
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 Liver transplant   2. Portal vein thrombectomy  3 . Right inguinal hernia repair- Fredo with XenMatrix mesh    Surgeon:  Surgeon(s) and Role:     * Emil Echevarria MD - Primary     * Yasir Sharif MD - Assisting     * Sebastián Agudelo MD - Assisting     * Barbara Jackson MD - Resident - Assisting    Fellow/assistant:   Sebastián Agudelo MD, fellow, Barbara Jackson,      Anesthesia:  General    Specimen:  1) native liver 2) donor gallbladder 3) ascites for cx, 4) hernia sac    Drains:  KARINE X2- RUQ behind gabrielle, RLQ in inguinal hernia space    Urine output:  1000 mls    Estimated blood loss:  500    Fluids administered:       Intraoperative Events: none    Complications: None    Findings:   1) recipient with replaced right hepatic artery. Donor artery calcified. Performed arterial anastomosis with replaced right to donor common hepatic due to low flow in native 'common/left' hepatic. Peak flow 270mL/min  2) trace ascites- sent for culture  3) partial portal vein thrombus- thrombectomized  4) right inguinal hernia- pantaloon type. Preperitoneal fat within sac excised and reduced. Fredo repair using XenMatrix mesh      Indication: Loy Limon with a history of End Stage Liver Disease due to Laennec's with HCC who presents for  - Brain Death Whole Liver transplant. A suitable donor offer has become available. After discussing the risks and benefits of proceeding, the patient agreed to proceed with surgery and provided informed consent.    Final ABO/Crossmatch verification: After the donor organ arrived to the operating room and prior to anastomosis, I participated in the transplant pre-verification upon organ receipt timeout by visually verifying the donor ID, organ and  laterality, donor blood type, recipient unique identifier, recipient blood type, and that the donor and recipient are blood type compatible.     Donor UNOS ID:  [id number]    Donor ABO:  B    Donor arterial clamp on:  12/22/2018  8:04 AM    Preservation fluid:  UW     Vessels with organ:  Yes    Donor organ arrival to recipient room:  12/22/2018 12:34 PM    Total ischemic time:  378    Cold ischemic time:  331    Warm ischemic time:  47    Ex-vivo:  No    Time placed on Ex-vivo perfusion:  Yes    Total time on Ex-vivo perfusion:  n/a min     Back Table Preparation:   Procedure:  Bench preparation of the liver allograft for transplantation    Preoperative diagnosis:  End Stage Liver Disease due to Laennec's with treated HCC    Postoperative diagnosis:  Same    Surgeon:  Surgeon(s) and Role:     * Emil Echevarria MD - Primary     * Yasir Sharif MD - Assisting     * Sebastián Agudelo MD - Assisting     * Barbara Jackosn MD - Resident - Assisting    Co-Surgeon:  Emil Echevarria M.D.    Fellow/Assistant:  Sebastián Agudelo, fellow, Yasir Sharif, fellow,  Barbara Oconnor resident     Anesthesia:  None    Graft biopsy:  Yes at procurement. Not reperfusion    Macroscopic steatosis:  Mild    Back table reconstruction:  No Reconstruction    Intimal flap repair:      # of hepatic arteries:  1    # of portal veins:  1    Accessory arteries:  No    # of hepatic veins:  3    # of bile ducts:  1    Graft weight:       Findings:   Liver Laceration: No  Overall quality of liver: Fair    Back Table Procedure: The liver allograft was received and inspected and the aforementioned findings were noted. It was flushed with UW. The donor liver was placed in fresh ice-cold preservation solution. We identified the inferior vena cava. Two stay sutures were placed on the supra-hepatic portion of the cava. Two stay sutures were placed on the infra-hepatic portion of the cava. The fibro-fatty tissue and adrenal gland was  cleared of inferior vena cava. The phrenic vein was ligated. The adrenal vein was ligated. The IVC was tested for leaks by using a bulb syringe. We suture ligated all identified leaks. The portal vein was identified. All the fibro-fatty area or tissue around the portal vein was removed and the portal vein was dissected up to its bifurcation. An 8-Thai cannula was placed in the portal vein and fixed with a stitch. The portal vein was tested for leaks. We suture ligated all identified leaks. The cannula was left in place to be used for flushing the liver at the time of implantation. The hepatic artery anatomy was identified. The celiac axis  was traced all the way from the aortic patch to the level of the gastro-duodenal artery. Dissection was stopped at the level of the gastro-duodenal artery. All the leaks in the hepatic artery tributaries were suture ligated. The bile duct was inspected and flushed. No reconstruction was required. The liver was placed back in ice-cold preservation solution until ready for transplantation. Faculty was present for the critical portions of the procedure.    Findings: as above   Operative Procedure:   Arterial anastomosis start:  12/22/2018  1:35 PM    Recipient arterial unclamp:  12/22/2018  2:23 PM    Extra vessels used:  no    Extra vessels banked:  Yes    Previous upper abd surgery:  Yes    Previous cholecystectomy?  No    Portal vein:  Thrombus: Yes-  mild   Patent: Yes-   Specify: flow 1000mL/min on portal venous bypass following thrombectomy    On portal bypass:  Yes-  Flow 1000mL/min    Arterial flow:  Sufficient: Yes-  Flow 270mL/min    Bile duct anastomosis:  To duct: Yes   Specify: duct to duct without stent     Loy Limon was brought to the operating room, placed in a supine position, and a time out was performed. Sequential compression devices were placed on both lower extremities and general endotracheal anesthesia was induced. The patient was given IV antibiotics,  and Cellcept and Solumedrol. A Cronin catheter was placed. A central line was placed by Anesthesia service. The abdomen was then shaved, prepped, and draped in the usual sterile fashion.  The backtable preparation occurred prior to implantation.    We entered the abdominal cavity using Vertical Extension (Kym) incision. The abdomen was examined. There were adhesions to the gallbladder which were taken down with electrocautery. We proceeded to mobilization of the liver first by dividing falciform ligament, next dividing the triangular ligaments and mobilizing the left lobe with further evaluation of the right lobe of the liver. Next the right lobe of the liver was mobilized. We elected to proceed with a hilar dissection. The right and left hepatic arteries were identified, ligated and divided, followed by ligation and division of the common bile duct. The portal vein was cleared from tissue and small branches were tied off and divided.  The left and right portal veins were separately ligated and the portal vein was divided. There was a partial portal vein thrombus and we did a endovein- thrombecotmy. At this point we went on the bypass with bypass flow of 1 liter per minute. Next, we continued mobilizing the right lobe. The adhesions between the right lobe and the right diaphragm were divided and the lobe was mobilized up to the vena cava. The hepatic veins were identified. Next, we dissected out the caudate lobe and infrahepatic IVC.     We applied Infrahepatic and suprahepatic clamps to the IVC and the liver was excised with curved Angulo scissors. The recipient's liver was passed off from the operating table. Next, complete hemostasis was obtained. The donor liver was brought into the field. The suprahepatic IVC anastomosis was constructed with 3-0 Prolene followed by the construction of infrahepatic anastomosis with 4-0 Prolene. Next, we came off the bypass and end-to-end portal anastomosis was constructed  between the donor and recipient portal veins using 6-0 Prolene. During the construction of the infrahepatic IVC anastomosis, the liver was flushed with 5% albumin. Once the portal anastomosis was constructed, the liver was reperfused. Complete hemostasis was obtained. Recipient arterial anatomy was notable for replaced right hepatic artery. This artery appeared to be dominant in blood flow over native 'common/left' hepatic artery. The donor celiac axis was calcified and required trimming back to the common hepatic artery for a suitable target for anastomosis. Arterial anastomosis was performed between the donor common hepatic artery and the recipient replaced right hepatic artery. After clamps were released, there was a passable flow within the donor artery. Flow was measured at 270mL/min using transsonic flow probe. Again, all the arterial branches that were bleeding were ligated and complete hemostasis was obtained. After the anastomosis was performed, good flow was re-established, hemostasis was obtained. Next, the biliary anastomosis was performed using a 6-0 interrupted PDS suture. A stent was not placed as the ducts were well-size matched and widely patent.      Next again the abdomen was irrigated and hemostasis was obtained. We closed the abdomen with running #1 PDS closing the anterior and posterior fascia. Interrupted 3-0 vicryl was placed in the subcutaneous tissues and staples placed in the skin. The wound was copiously irrigated between each layer of closure and closure was completed using the closing tray. . All members of the surgical team rescrubbed and changed gown and gloves prior to closure with closing tray. All needle, sponge and instrument counts were correct x 2.       Right Inguinal Hernia repair:    We then turned our attention to the right inguinal hernia repair. We took down the drapes and reprepped/Ioban-ed/draped the right groin. The pubic tubercle and the ASIS were marked, and a modest  skin incision in whit's lines in the right inguinal region. Using a combination of sharp dissection and electrocautery dissection was carried down to the level of the external oblique aponeurosis. The aponeurosis was sharply incised with care taken to protect the ilioinguinal nerve. The contents were bluntly dissected from the flaps of the external oblique fascia to expose the shelving edge and the internal oblique fascia. Two hernia sacs were identified- a direct (floor weakness) and very large indirect containing preperitoneal fat. The indirect sacs were dissected free of the cord, opened with reduction of contents (non-visceral). The sac was was then ligated and excised with reduction noted within the internal ring. The internal ring was tightened with a  Silk stitch but with enough room to allow the cord room. The preperitoneal fat was also reduced within the internal ring. A XenMatrix mesh was passed onto the field and soaked. It was trimmed to size and sewn in using 2-0 prolene in a classic Fredo technique (anchored to pubic tubercle, running on shelving edge, interrupted to internal oblique fascia). We brought the tails of the mesh together as well to recreate the obliterated internal ring. We copiously irrigated the wound and held valsalva at 30mmHg with no bleeding noted and no concern for hernia recurrence. We closed the external oblique with running 3-0 PDS taking care again to preserve the ilioinguinal nerve. We held valsalva without recurrence or bleeding. We copiously irrigated the wound. A 19 Fr Bal drain was placed into the space due to extremely high risk of infection. The deep dermis was approximated with interrupted 3-0 vicryl stitches and the skin closed with staples and a dry dressing.     Faculty was present for key portions of the procedure. The patient was awakened, extubated, and transferred to the S ICU for post-op monitoring.      Physician Attestation   I was present for the  key portions of the procedure and I was immediately available for the entire procedure between opening and closing.    Emil Echevarria  Date of Service (when I saw the patient): 12/22/2018

## 2018-12-23 NOTE — PROGRESS NOTES
Admitted/transferred from: OR  Reason for admission/transfer: Liver transplant  Patient status upon admission/transfer: Intubated/sedated  Interventions:   Plan:   2 RN skin assessment: completed by Layne Mcdonald RN and Rayne Nur RN  Result of skin assessment and interventions/actions: Skin tears noted to anterior chest around incision site most likely from tape, and one noted to posterior left thigh. Cleansed and left IVETT.  Height, weight, drug calc weight: done  Patient belongings: In room with spouse  MDRO education (if applicable):

## 2018-12-24 ENCOUNTER — APPOINTMENT (OUTPATIENT)
Dept: GENERAL RADIOLOGY | Facility: CLINIC | Age: 63
DRG: 005 | End: 2018-12-24
Attending: TRANSPLANT SURGERY
Payer: MEDICARE

## 2018-12-24 ENCOUNTER — APPOINTMENT (OUTPATIENT)
Dept: PHYSICAL THERAPY | Facility: CLINIC | Age: 63
DRG: 005 | End: 2018-12-24
Attending: TRANSPLANT SURGERY
Payer: MEDICARE

## 2018-12-24 ENCOUNTER — APPOINTMENT (OUTPATIENT)
Dept: OCCUPATIONAL THERAPY | Facility: CLINIC | Age: 63
DRG: 005 | End: 2018-12-24
Attending: TRANSPLANT SURGERY
Payer: MEDICARE

## 2018-12-24 LAB
ALBUMIN SERPL-MCNC: 1.6 G/DL (ref 3.4–5)
ALP SERPL-CCNC: 171 U/L (ref 40–150)
ALT SERPL W P-5'-P-CCNC: 468 U/L (ref 0–70)
ANION GAP SERPL CALCULATED.3IONS-SCNC: 6 MMOL/L (ref 3–14)
AST SERPL W P-5'-P-CCNC: 455 U/L (ref 0–45)
BACTERIA SPEC CULT: NORMAL
BASOPHILS # BLD AUTO: 0 10E9/L (ref 0–0.2)
BASOPHILS NFR BLD AUTO: 0.1 %
BILIRUB DIRECT SERPL-MCNC: 0.6 MG/DL (ref 0–0.2)
BILIRUB SERPL-MCNC: 0.8 MG/DL (ref 0.2–1.3)
BUN SERPL-MCNC: 28 MG/DL (ref 7–30)
CALCIUM SERPL-MCNC: 7 MG/DL (ref 8.5–10.1)
CHLORIDE SERPL-SCNC: 108 MMOL/L (ref 94–109)
CO2 SERPL-SCNC: 26 MMOL/L (ref 20–32)
CREAT SERPL-MCNC: 0.84 MG/DL (ref 0.66–1.25)
DIFFERENTIAL METHOD BLD: ABNORMAL
EOSINOPHIL # BLD AUTO: 0 10E9/L (ref 0–0.7)
EOSINOPHIL NFR BLD AUTO: 0.4 %
ERYTHROCYTE [DISTWIDTH] IN BLOOD BY AUTOMATED COUNT: 15.2 % (ref 10–15)
GFR SERPL CREATININE-BSD FRML MDRD: >90 ML/MIN/{1.73_M2}
GLUCOSE BLDC GLUCOMTR-MCNC: 105 MG/DL (ref 70–99)
GLUCOSE BLDC GLUCOMTR-MCNC: 105 MG/DL (ref 70–99)
GLUCOSE BLDC GLUCOMTR-MCNC: 112 MG/DL (ref 70–99)
GLUCOSE BLDC GLUCOMTR-MCNC: 113 MG/DL (ref 70–99)
GLUCOSE BLDC GLUCOMTR-MCNC: 118 MG/DL (ref 70–99)
GLUCOSE BLDC GLUCOMTR-MCNC: 119 MG/DL (ref 70–99)
GLUCOSE BLDC GLUCOMTR-MCNC: 122 MG/DL (ref 70–99)
GLUCOSE BLDC GLUCOMTR-MCNC: 123 MG/DL (ref 70–99)
GLUCOSE BLDC GLUCOMTR-MCNC: 156 MG/DL (ref 70–99)
GLUCOSE BLDC GLUCOMTR-MCNC: 181 MG/DL (ref 70–99)
GLUCOSE BLDC GLUCOMTR-MCNC: 183 MG/DL (ref 70–99)
GLUCOSE BLDC GLUCOMTR-MCNC: 205 MG/DL (ref 70–99)
GLUCOSE BLDC GLUCOMTR-MCNC: 224 MG/DL (ref 70–99)
GLUCOSE BLDC GLUCOMTR-MCNC: 226 MG/DL (ref 70–99)
GLUCOSE BLDC GLUCOMTR-MCNC: 97 MG/DL (ref 70–99)
GLUCOSE BLDC GLUCOMTR-MCNC: 97 MG/DL (ref 70–99)
GLUCOSE SERPL-MCNC: 125 MG/DL (ref 70–99)
HCT VFR BLD AUTO: 24.9 % (ref 40–53)
HGB BLD-MCNC: 8 G/DL (ref 13.3–17.7)
IMM GRANULOCYTES # BLD: 0 10E9/L (ref 0–0.4)
IMM GRANULOCYTES NFR BLD: 0.2 %
INTERPRETATION ECG - MUSE: NORMAL
INTERPRETATION ECG - MUSE: NORMAL
LYMPHOCYTES # BLD AUTO: 0.4 10E9/L (ref 0.8–5.3)
LYMPHOCYTES NFR BLD AUTO: 4.5 %
MAGNESIUM SERPL-MCNC: 1.9 MG/DL (ref 1.6–2.3)
MCH RBC QN AUTO: 31.9 PG (ref 26.5–33)
MCHC RBC AUTO-ENTMCNC: 32.1 G/DL (ref 31.5–36.5)
MCV RBC AUTO: 99 FL (ref 78–100)
MONOCYTES # BLD AUTO: 0.5 10E9/L (ref 0–1.3)
MONOCYTES NFR BLD AUTO: 5.1 %
NEUTROPHILS # BLD AUTO: 8.4 10E9/L (ref 1.6–8.3)
NEUTROPHILS NFR BLD AUTO: 89.7 %
NRBC # BLD AUTO: 0 10*3/UL
NRBC BLD AUTO-RTO: 0 /100
PHOSPHATE SERPL-MCNC: 4.4 MG/DL (ref 2.5–4.5)
PLATELET # BLD AUTO: 62 10E9/L (ref 150–450)
POTASSIUM SERPL-SCNC: 3.9 MMOL/L (ref 3.4–5.3)
PROT SERPL-MCNC: 4.7 G/DL (ref 6.8–8.8)
RBC # BLD AUTO: 2.51 10E12/L (ref 4.4–5.9)
SODIUM SERPL-SCNC: 140 MMOL/L (ref 133–144)
SPECIMEN SOURCE: NORMAL
TACROLIMUS BLD-MCNC: <3 UG/L (ref 5–15)
TME LAST DOSE: ABNORMAL H
VANCOMYCIN SERPL-MCNC: 14.4 MG/L
WBC # BLD AUTO: 9.3 10E9/L (ref 4–11)

## 2018-12-24 PROCEDURE — 25000128 H RX IP 250 OP 636: Performed by: NURSE PRACTITIONER

## 2018-12-24 PROCEDURE — 36592 COLLECT BLOOD FROM PICC: CPT | Performed by: TRANSPLANT SURGERY

## 2018-12-24 PROCEDURE — A9270 NON-COVERED ITEM OR SERVICE: HCPCS | Mod: GY | Performed by: STUDENT IN AN ORGANIZED HEALTH CARE EDUCATION/TRAINING PROGRAM

## 2018-12-24 PROCEDURE — 25000128 H RX IP 250 OP 636: Performed by: STUDENT IN AN ORGANIZED HEALTH CARE EDUCATION/TRAINING PROGRAM

## 2018-12-24 PROCEDURE — 25000128 H RX IP 250 OP 636: Performed by: SURGERY

## 2018-12-24 PROCEDURE — 74018 RADEX ABDOMEN 1 VIEW: CPT

## 2018-12-24 PROCEDURE — 40000193 ZZH STATISTIC PT WARD VISIT

## 2018-12-24 PROCEDURE — 12000024 ZZH R&B TRANSPLANT CRITICAL

## 2018-12-24 PROCEDURE — 80197 ASSAY OF TACROLIMUS: CPT | Performed by: NURSE PRACTITIONER

## 2018-12-24 PROCEDURE — 36592 COLLECT BLOOD FROM PICC: CPT | Performed by: NURSE PRACTITIONER

## 2018-12-24 PROCEDURE — 83605 ASSAY OF LACTIC ACID: CPT | Performed by: TRANSPLANT SURGERY

## 2018-12-24 PROCEDURE — 80076 HEPATIC FUNCTION PANEL: CPT | Performed by: NURSE PRACTITIONER

## 2018-12-24 PROCEDURE — 84100 ASSAY OF PHOSPHORUS: CPT | Performed by: NURSE PRACTITIONER

## 2018-12-24 PROCEDURE — 97110 THERAPEUTIC EXERCISES: CPT | Mod: GO

## 2018-12-24 PROCEDURE — 80048 BASIC METABOLIC PNL TOTAL CA: CPT | Performed by: NURSE PRACTITIONER

## 2018-12-24 PROCEDURE — 80076 HEPATIC FUNCTION PANEL: CPT | Performed by: SURGERY

## 2018-12-24 PROCEDURE — 97530 THERAPEUTIC ACTIVITIES: CPT | Mod: GP

## 2018-12-24 PROCEDURE — 83735 ASSAY OF MAGNESIUM: CPT | Performed by: NURSE PRACTITIONER

## 2018-12-24 PROCEDURE — 25000131 ZZH RX MED GY IP 250 OP 636 PS 637: Mod: GY | Performed by: NURSE PRACTITIONER

## 2018-12-24 PROCEDURE — A9270 NON-COVERED ITEM OR SERVICE: HCPCS | Mod: GY | Performed by: NURSE PRACTITIONER

## 2018-12-24 PROCEDURE — A9270 NON-COVERED ITEM OR SERVICE: HCPCS | Mod: GY | Performed by: SURGERY

## 2018-12-24 PROCEDURE — 25000132 ZZH RX MED GY IP 250 OP 250 PS 637: Mod: GY | Performed by: SURGERY

## 2018-12-24 PROCEDURE — 80202 ASSAY OF VANCOMYCIN: CPT | Performed by: TRANSPLANT SURGERY

## 2018-12-24 PROCEDURE — 00000146 ZZHCL STATISTIC GLUCOSE BY METER IP

## 2018-12-24 PROCEDURE — 36592 COLLECT BLOOD FROM PICC: CPT | Performed by: STUDENT IN AN ORGANIZED HEALTH CARE EDUCATION/TRAINING PROGRAM

## 2018-12-24 PROCEDURE — 87040 BLOOD CULTURE FOR BACTERIA: CPT | Performed by: STUDENT IN AN ORGANIZED HEALTH CARE EDUCATION/TRAINING PROGRAM

## 2018-12-24 PROCEDURE — 85025 COMPLETE CBC W/AUTO DIFF WBC: CPT | Performed by: NURSE PRACTITIONER

## 2018-12-24 PROCEDURE — 25000132 ZZH RX MED GY IP 250 OP 250 PS 637: Mod: GY | Performed by: PHYSICIAN ASSISTANT

## 2018-12-24 PROCEDURE — 93010 ELECTROCARDIOGRAM REPORT: CPT | Performed by: INTERNAL MEDICINE

## 2018-12-24 PROCEDURE — 71045 X-RAY EXAM CHEST 1 VIEW: CPT

## 2018-12-24 PROCEDURE — 25000132 ZZH RX MED GY IP 250 OP 250 PS 637: Mod: GY | Performed by: STUDENT IN AN ORGANIZED HEALTH CARE EDUCATION/TRAINING PROGRAM

## 2018-12-24 PROCEDURE — 40000133 ZZH STATISTIC OT WARD VISIT

## 2018-12-24 PROCEDURE — 25000132 ZZH RX MED GY IP 250 OP 250 PS 637: Mod: GY | Performed by: NURSE PRACTITIONER

## 2018-12-24 PROCEDURE — A9270 NON-COVERED ITEM OR SERVICE: HCPCS | Mod: GY | Performed by: PHYSICIAN ASSISTANT

## 2018-12-24 PROCEDURE — 97162 PT EVAL MOD COMPLEX 30 MIN: CPT | Mod: GP

## 2018-12-24 PROCEDURE — 25000128 H RX IP 250 OP 636: Performed by: TRANSPLANT SURGERY

## 2018-12-24 PROCEDURE — 25000131 ZZH RX MED GY IP 250 OP 636 PS 637: Mod: GY | Performed by: SURGERY

## 2018-12-24 RX ORDER — CEFAZOLIN SODIUM 1 G/3ML
1 INJECTION, POWDER, FOR SOLUTION INTRAMUSCULAR; INTRAVENOUS SEE ADMIN INSTRUCTIONS
Status: CANCELLED | OUTPATIENT
Start: 2018-12-24

## 2018-12-24 RX ORDER — DEXTROSE MONOHYDRATE 25 G/50ML
25-50 INJECTION, SOLUTION INTRAVENOUS
Status: DISCONTINUED | OUTPATIENT
Start: 2018-12-24 | End: 2018-12-31

## 2018-12-24 RX ORDER — TACROLIMUS 0.5 MG/1
3 CAPSULE, GELATIN COATED ORAL
Status: DISCONTINUED | OUTPATIENT
Start: 2018-12-24 | End: 2018-12-24

## 2018-12-24 RX ORDER — AMLODIPINE BESYLATE 5 MG/1
5 TABLET ORAL DAILY
Status: DISCONTINUED | OUTPATIENT
Start: 2018-12-24 | End: 2018-12-26

## 2018-12-24 RX ORDER — FUROSEMIDE 10 MG/ML
40 INJECTION INTRAMUSCULAR; INTRAVENOUS ONCE
Status: COMPLETED | OUTPATIENT
Start: 2018-12-24 | End: 2018-12-24

## 2018-12-24 RX ORDER — LABETALOL HYDROCHLORIDE 5 MG/ML
20 INJECTION, SOLUTION INTRAVENOUS EVERY 4 HOURS PRN
Status: DISCONTINUED | OUTPATIENT
Start: 2018-12-24 | End: 2018-12-25

## 2018-12-24 RX ORDER — BISACODYL 10 MG
10 SUPPOSITORY, RECTAL RECTAL DAILY PRN
Status: DISCONTINUED | OUTPATIENT
Start: 2018-12-24 | End: 2019-01-07 | Stop reason: HOSPADM

## 2018-12-24 RX ORDER — ALBUTEROL SULFATE 5 MG/ML
2.5 SOLUTION RESPIRATORY (INHALATION) EVERY 6 HOURS PRN
Status: DISCONTINUED | OUTPATIENT
Start: 2018-12-24 | End: 2018-12-25

## 2018-12-24 RX ORDER — NICOTINE POLACRILEX 4 MG
15-30 LOZENGE BUCCAL
Status: DISCONTINUED | OUTPATIENT
Start: 2018-12-24 | End: 2018-12-31

## 2018-12-24 RX ORDER — CEFAZOLIN SODIUM 2 G/100ML
2 INJECTION, SOLUTION INTRAVENOUS
Status: CANCELLED | OUTPATIENT
Start: 2018-12-24

## 2018-12-24 RX ORDER — VANCOMYCIN HYDROCHLORIDE 1 G/200ML
1000 INJECTION, SOLUTION INTRAVENOUS
Status: CANCELLED | OUTPATIENT
Start: 2018-12-24

## 2018-12-24 RX ORDER — NADOLOL 20 MG/1
20 TABLET ORAL DAILY
Status: ON HOLD | COMMUNITY
End: 2019-01-07

## 2018-12-24 RX ORDER — PIPERACILLIN SODIUM, TAZOBACTAM SODIUM 3; .375 G/15ML; G/15ML
3.38 INJECTION, POWDER, LYOPHILIZED, FOR SOLUTION INTRAVENOUS ONCE
Status: CANCELLED | OUTPATIENT
Start: 2018-12-24

## 2018-12-24 RX ORDER — TACROLIMUS 1 MG/1
3 CAPSULE ORAL
Status: DISCONTINUED | OUTPATIENT
Start: 2018-12-24 | End: 2018-12-27

## 2018-12-24 RX ADMIN — POLYETHYLENE GLYCOL 3350 17 G: 17 POWDER, FOR SOLUTION ORAL at 08:02

## 2018-12-24 RX ADMIN — PIPERACILLIN SODIUM,TAZOBACTAM SODIUM 3.38 G: 3; .375 INJECTION, POWDER, FOR SOLUTION INTRAVENOUS at 19:41

## 2018-12-24 RX ADMIN — Medication 0.5 MG: at 13:27

## 2018-12-24 RX ADMIN — SULFAMETHOXAZOLE AND TRIMETHOPRIM 1 TABLET: 400; 80 TABLET ORAL at 08:01

## 2018-12-24 RX ADMIN — VANCOMYCIN HYDROCHLORIDE 1500 MG: 10 INJECTION, POWDER, LYOPHILIZED, FOR SOLUTION INTRAVENOUS at 11:49

## 2018-12-24 RX ADMIN — PHYTONADIONE 10 MG: 10 INJECTION, EMULSION INTRAMUSCULAR; INTRAVENOUS; SUBCUTANEOUS at 13:19

## 2018-12-24 RX ADMIN — GUAIFENESIN AND DEXTROMETHORPHAN HYDROBROMIDE 1 TABLET: 600; 30 TABLET, EXTENDED RELEASE ORAL at 22:52

## 2018-12-24 RX ADMIN — INSULIN ASPART 3 UNITS: 100 INJECTION, SOLUTION INTRAVENOUS; SUBCUTANEOUS at 19:41

## 2018-12-24 RX ADMIN — VALGANCICLOVIR 450 MG: 450 TABLET, FILM COATED ORAL at 08:01

## 2018-12-24 RX ADMIN — MAGNESIUM HYDROXIDE 30 ML: 400 SUSPENSION ORAL at 14:21

## 2018-12-24 RX ADMIN — MYCOPHENOLATE MOFETIL 1000 MG: 250 CAPSULE ORAL at 08:02

## 2018-12-24 RX ADMIN — PIPERACILLIN SODIUM,TAZOBACTAM SODIUM 3.38 G: 3; .375 INJECTION, POWDER, FOR SOLUTION INTRAVENOUS at 02:44

## 2018-12-24 RX ADMIN — OXYCODONE HYDROCHLORIDE 10 MG: 5 TABLET ORAL at 11:48

## 2018-12-24 RX ADMIN — ASPIRIN 325 MG: 325 TABLET, DELAYED RELEASE ORAL at 08:01

## 2018-12-24 RX ADMIN — OXYCODONE HYDROCHLORIDE 10 MG: 5 TABLET ORAL at 08:01

## 2018-12-24 RX ADMIN — PIPERACILLIN SODIUM,TAZOBACTAM SODIUM 3.38 G: 3; .375 INJECTION, POWDER, FOR SOLUTION INTRAVENOUS at 14:21

## 2018-12-24 RX ADMIN — OMEPRAZOLE 20 MG: 20 CAPSULE, DELAYED RELEASE ORAL at 08:01

## 2018-12-24 RX ADMIN — PIPERACILLIN SODIUM,TAZOBACTAM SODIUM 3.38 G: 3; .375 INJECTION, POWDER, FOR SOLUTION INTRAVENOUS at 08:00

## 2018-12-24 RX ADMIN — Medication 0.5 MG: at 16:34

## 2018-12-24 RX ADMIN — OXYCODONE HYDROCHLORIDE 10 MG: 5 TABLET ORAL at 18:26

## 2018-12-24 RX ADMIN — TACROLIMUS 3 MG: 1 CAPSULE ORAL at 18:26

## 2018-12-24 RX ADMIN — SENNOSIDES AND DOCUSATE SODIUM 2 TABLET: 8.6; 5 TABLET ORAL at 08:01

## 2018-12-24 RX ADMIN — SENNOSIDES AND DOCUSATE SODIUM 2 TABLET: 8.6; 5 TABLET ORAL at 19:41

## 2018-12-24 RX ADMIN — METHYLPREDNISOLONE SODIUM SUCCINATE 100 MG: 125 INJECTION, POWDER, LYOPHILIZED, FOR SOLUTION INTRAMUSCULAR; INTRAVENOUS at 08:09

## 2018-12-24 RX ADMIN — FUROSEMIDE 40 MG: 10 INJECTION, SOLUTION INTRAVENOUS at 13:08

## 2018-12-24 RX ADMIN — MYCOPHENOLATE MOFETIL 1000 MG: 250 CAPSULE ORAL at 18:26

## 2018-12-24 RX ADMIN — SODIUM CHLORIDE, POTASSIUM CHLORIDE, SODIUM LACTATE AND CALCIUM CHLORIDE: 600; 310; 30; 20 INJECTION, SOLUTION INTRAVENOUS at 16:28

## 2018-12-24 RX ADMIN — FLUCONAZOLE 200 MG: 2 INJECTION, SOLUTION INTRAVENOUS at 21:01

## 2018-12-24 RX ADMIN — INSULIN ASPART 2 UNITS: 100 INJECTION, SOLUTION INTRAVENOUS; SUBCUTANEOUS at 13:04

## 2018-12-24 RX ADMIN — AMLODIPINE BESYLATE 5 MG: 5 TABLET ORAL at 13:05

## 2018-12-24 RX ADMIN — Medication 0.3 MG: at 09:39

## 2018-12-24 RX ADMIN — Medication 20 MG: at 22:53

## 2018-12-24 RX ADMIN — OXYCODONE HYDROCHLORIDE 10 MG: 5 TABLET ORAL at 04:02

## 2018-12-24 RX ADMIN — TACROLIMUS 2 MG: 0.5 CAPSULE, GELATIN COATED ORAL at 08:01

## 2018-12-24 ASSESSMENT — ACTIVITIES OF DAILY LIVING (ADL)
ADLS_ACUITY_SCORE: 11
ADLS_ACUITY_SCORE: 12
ADLS_ACUITY_SCORE: 12
ADLS_ACUITY_SCORE: 9
ADLS_ACUITY_SCORE: 11
ADLS_ACUITY_SCORE: 11

## 2018-12-24 ASSESSMENT — MIFFLIN-ST. JEOR: SCORE: 1649.46

## 2018-12-24 NOTE — PLAN OF CARE
Discharge Planner PT   Patient plan for discharge: open to rehab  Current status: pt modA supine<>sit, stand to FWW CGA and takes a few small steps in room with FWW CGA. Pt mostly limited by pain. Time spent educating on abdominal precautions, instructing throughout effective coughing with pain, and discussing discharge planning and pt concerned for how he would care for self at home as wife needs to return to work and pt will be home alone.  Barriers to return to prior living situation: pain, medical needs, inaccessible home environment at current level of function (7 stairs), reduced caregiver support  Recommendations for discharge: TCU currently- pt may have potentiation to progress home with therapies pending ongoing progress with therapies and pain resolution.  Rationale for recommendations: Pt currently below baseline level of function and would benefit from ongoing therapy to address the above deficits in order to progress towards PLOF and promote IND mobility.       Entered by: Kathrin Toledo 12/24/2018 12:43 PM

## 2018-12-24 NOTE — SIGNIFICANT EVENT
SPIRITUAL HEALTH SERVICES Significant Event  [facility] ANOINTING  [facility]     Pt anointed by Father Girish Carter per request of pt.     Krishan Gaston MDiv  Chaplain Resident  [phone] (pager)

## 2018-12-24 NOTE — PROGRESS NOTES
SPIRITUAL HEALTH SERVICES  SPIRITUAL ASSESSMENT Progress Note  [facility]     REFERRAL SOURCE: Follow-up to prior  visit      Brief supportive follow-up visit to Loy and family following hi Lynne's visit to family this past Saturday, on day of surgery.    Loy, who along with his family is Portuguese-speaking, reports being in a lot of pain but is grateful for the surgery and to have his family bedside.    I offered a brief prayer and, after confirming that he is [employer], ascertained Phuong' interest in having Fr. Girish Carter visit him this afternoon to provide Sacrament of the Sick. He affirmed that he is interested.    PLAN: I will arrange for Fr. Carter to visit Loy.    Krishan Gaston  Chaplain Resident  Pager [phone]

## 2018-12-24 NOTE — PROGRESS NOTES
Transplant Surgery  Inpatient Daily Progress Note  12/24/2018    Assessment & Plan: 62 yo M hx Laennec's cirrhosis with HCC (treated), MELD 14. S/p OLT (54 yo M donor import DBD) with right inguinal hernia repair with biologic mesh 12/22/18. Notable for small arterial anastomosis (donor common hepatic with atherosclerosis to recipient replaced right hepatic)    Graft function:good, stable. T Bili 0.8, improving transaminitis (, ). INR 1.3, no FFP needed. Acidosis corrected prior to leaving OR. Vitamin K 10mg daily X3 days  Immunosuppression management: No change cellcept 1000 BID, steroid taper, tacrolimus- goal level 5 . Current level <3- will increase. Complexity of management:High. Contributing factors: anemia  Hematology: Hgb stable, 8.7. HDS. No transfusion intra/postop  -Hepatic artery anticoagulation: dextran X48 hrs, aspirin 325 starting today. Will need heparin and warfarin long term  Cardiorespiratory: Stable on room air. Has some secretions- adding nebulizers to help cough. Pulm toilet. SBP 170s, likely pain related. If persists would add low dose antihypertensive as needed  GI/Nutrition: regular diet. Awaiting return of bowel function. Monitor sarah counts- may need feeding tube due to low albumin preop. Currently awaiting return of bowel function- somewhat distended  Endocrine: insulin drip while on steroids  Fluid/Electrolytes: MIVF: cut to 50mL/hr, diurese with lasix. Saline lock when taking good PO.   : continue cota for I/O monitoring and for diuresis. Remove tomorrow  Infectious disease: continue abx (vanc/zosyn/diflucan) given herniorraphy with mesh  Prophylaxis: DVT, fall, GI, fungal  Disposition: 7A care, needs PT/OT    Medical Decision Making: High  Admit [id number] (high level decision making)    IRVIN/Fellow/Resident Provider: Sebastián Agudelo     Faculty: Shaila De La Cruz MD    __________________________________________________________________  Transplant History: Admitted  "12/21/2018 for DDLT and RIH repair (XenMatrix mesh).   The patient has a history of liver failure due to Laennec's cirrhosis with HCC.    12/22/2018 (Liver), Postoperative day: 2     Interval History: History is obtained from the patient with assistance of   Overnight events:NO events. Breathing well but has some secretions/congestion. Pain controlled more with dilaudid than oxycodone. Feels full, still no bowel function. No other complaints. Wants to get out of bed and move more.   ROS:   A 10-point review of systems was negative except as noted above.    Curent Meds:    aspirin  325 mg Oral Daily     fluconazole  200 mg Intravenous Q24H     insulin aspart   Subcutaneous TID AC     [START ON 12/25/2018] methylPREDNISolone  50 mg Intravenous Once    Followed by     [START ON 12/26/2018] predniSONE  25 mg Oral Once    Followed by     [START ON 12/27/2018] predniSONE  10 mg Oral or Feeding Tube Once     mycophenolate  1,000 mg Oral BID IS    Or     mycophenolate  1,000 mg Oral or NG Tube BID IS     omeprazole  20 mg Oral QAM AC     piperacillin-tazobactam  3.375 g Intravenous Q6H     polyethylene glycol  17 g Oral Daily     senna-docusate  2 tablet Oral or Feeding Tube BID     sodium chloride (PF)  3 mL Intravenous Q8H     sulfamethoxazole-trimethoprim  1 tablet Oral or Feeding Tube Daily     tacrolimus  2 mg Oral BID IS    Or     tacrolimus  2 mg Oral or NG Tube BID IS     valGANciclovir  450 mg Oral Daily    Or     valGANciclovir  450 mg Oral or NG Tube Daily     vancomycin (VANCOCIN) IV  1,500 mg Intravenous Q12H       Physical Exam:     Admit Weight: 84.6 kg (186 lb 9.6 oz)    Current Vitals:   /77   Pulse 66   Temp 100.9  F (38.3  C) (Oral)   Resp 20   Ht 1.702 m (5' 7\")   Wt 85.4 kg (188 lb 4.4 oz)   SpO2 94%   BMI 29.49 kg/m      CVP (mmHg): 13 mmHg    Vital sign ranges:    Temp:  [98.3  F (36.8  C)-101.5  F (38.6  C)] 100.9  F (38.3  C)  Pulse:  [62-72] 66  Heart Rate:  [63-93] " 93  Resp:  [16-20] 20  BP: (138-178)/(61-77) 173/77  FiO2 (%):  [2 %-4 %] 2 %  SpO2:  [87 %-97 %] 94 %  Patient Vitals for the past 24 hrs:   BP Temp Temp src Pulse Heart Rate Resp SpO2   12/24/18 1106 -- 100.9  F (38.3  C) Oral -- -- -- --   12/24/18 1015 -- 101.5  F (38.6  C) Oral -- -- -- --   12/24/18 0933 173/77 -- -- -- 93 -- 94 %   12/24/18 0746 175/77 100.3  F (37.9  C) Oral -- 87 20 97 %   12/24/18 0538 -- -- -- -- -- -- 94 %   12/24/18 0537 -- -- -- -- -- -- (!) 87 %   12/24/18 0358 154/69 98.8  F (37.1  C) Oral -- 78 16 92 %   12/23/18 2342 151/66 98.3  F (36.8  C) Oral -- 74 16 94 %   12/23/18 2238 167/66 -- -- -- 73 -- 95 %   12/23/18 2220 178/76 -- -- -- 69 -- 95 %   12/23/18 1904 149/73 98.7  F (37.1  C) Oral 66 66 18 93 %   12/23/18 1800 158/76 -- -- 72 69 -- 95 %   12/23/18 1700 140/67 -- -- 68 67 -- 95 %   12/23/18 1600 138/61 98.9  F (37.2  C) Axillary 68 65 16 93 %   12/23/18 1500 151/67 -- -- 62 63 16 95 %   12/23/18 1400 149/71 -- -- 67 70 16 95 %   12/23/18 1300 147/71 -- -- 67 70 16 97 %   12/23/18 1200 141/67 98.9  F (37.2  C) Oral 67 66 16 97 %     General Appearance: in no apparent distress.   Skin: normal, warm, dry, No rashes, induration, or jaundice  Heart: regular rate and rhythm  Lungs: without wheezes, clear to ausculattion  Abdomen: abd soft, slightly distended. Incision c/d/i, staples in place. KARINE X2- serosang. 190mL from inguinal drain, 54 from abdominal.   : The genitalia are edematous with some swelling in right inguinal hernia repair site  Extremities: edema: present bilaterally. 2+. There is no skin breakdown.  Neurologic: awake, alert and oriented. Tremor absent.. Asterixis: absent.    Frailty Scores     Frailty Scores 5/29/2018    Final Score Not Frail    Final Score Number 0          Data:   CMP  Recent Labs   Lab 12/24/18  0524 12/23/18  0404  12/22/18 2010 12/22/18  0013    144   < > 139   < > 139   POTASSIUM 3.9 3.6   < > 3.5   < > 4.5   CHLORIDE 108 112*   < >   --   --  109   CO2 26 24   < >  --   --  26   * 107*   < > 172*   < > 167*   BUN 28 18   < >  --   --  10   CR 0.84 0.66   < >  --   --  0.50*   GFRESTIMATED >90 >90   < >  --   --  >90   GFRESTBLACK >90 >90   < >  --   --  >90   YELENA 7.0* 6.9*   < >  --   --  7.7*   ICAW  --  4.5  --  4.8   < >  --    MAG 1.9 1.7  --   --   --  2.0   PHOS 4.4 4.4  --   --   --  2.8   AMYLASE  --  46  --   --   --  67   LIPASE  --  93  --   --   --   --    ALBUMIN 1.6* 1.6*   < >  --   --  2.5*   BILITOTAL 0.8 1.7*   < >  --   --  1.7*   ALKPHOS 171* 166*   < >  --   --  408*   * 773*   < >  --   --  48*   * 602*   < >  --   --  40    < > = values in this interval not displayed.     CBC  Recent Labs   Lab 12/24/18  0524 12/23/18  1204  12/22/18 2121   HGB 8.0* 8.7*   < > 9.3*   WBC 9.3 11.3*   < > 8.0   PLT 62* 53*   < > 66*   A1C  --   --   --  5.8*    < > = values in this interval not displayed.     COAGS  Recent Labs   Lab 12/23/18  1204 12/23/18  0404 12/22/18  2121 12/22/18  0013   INR 1.36* 1.61* 2.04* 1.77*   PTT  --   --  40* 38*      Urinalysis  Recent Labs   Lab Test 12/22/18  0034 11/07/18  0930  07/19/18  1133   COLOR Yellow Yellow   < > Yellow   APPEARANCE Clear Clear   < > Clear   URINEGLC Negative Negative   < > Negative   URINEBILI Negative Negative   < > Negative   URINEKETONE Negative Negative   < > Negative   SG 1.012 1.010   < > 1.009   UBLD Trace* Negative   < > Negative   URINEPH 6.5 7.0   < > 8.0*   PROTEIN Negative Negative   < > Negative   NITRITE Negative Negative   < > Negative   LEUKEST Negative Negative   < > Negative   RBCU 3* 1   < >  --    WBCU 1 1   < >  --    UTPG  --   --   --  Unable to calculate due to low value    < > = values in this interval not displayed.     Virology:  No results found for: CSPEC, CMVPCR, CMQNT, CMVQ, EBVDN, CMVM, CMIG, CMVG, CMIGG, CMLTX, EBVIGG, EBIGG, EBVAGN, HCVAB, HBSAB, BKRES

## 2018-12-24 NOTE — PROGRESS NOTES
18 1000   Quick Adds   Type of Visit Initial PT Evaluation       Present yes   Living Environment   Lives With spouse   Living Arrangements apartment   Home Accessibility stairs to enter home   Living Environment Comment pt lives in 2nd floor apartement, 7 stairs to enter with B rails to access apartment.   Self-Care   Usual Activity Tolerance good   Current Activity Tolerance fair   Equipment Currently Used at Home none   Activity/Exercise/Self-Care Comment pt worked part time peeling and packaging fruit, plans to return to this at some point.   Functional Level Prior   Ambulation 0-->independent   Transferring 0-->independent   Toileting 0-->independent   Bathing 0-->independent   Communication 0-->understands/communicates without difficulty   Swallowing 0-->swallows foods/liquids without difficulty   Cognition 0 - no cognition issues reported   Fall history within last six months no   Which of the above functional risks had a recent onset or change? ambulation;transferring;toileting;bathing;dressing   Prior Functional Level Comment pt previously IND in all cares and mobility.   General Information   Onset of Illness/Injury or Date of Surgery - Date 18   Referring Physician Emil Echevarria MD   Patient/Family Goals Statement none stated   Pertinent History of Current Problem (include personal factors and/or comorbidities that impact the POC) Loy Limon is a 63 year old Ecuadorean speaking male from [place] with a history of EtOH liver cirrhosis (MELD 14), hepatocellular carcinoma s/p ablation, latent TB and hypertension who is s/p  donor liver transplant and right inguinal hernia repair    Precautions/Limitations abdominal precautions   General Observations pt sweaty with fever, RN and pt okay'd session   General Info Comments activity orders: up w/assist   Cognitive Status Examination   Orientation orientation to person, place and time   Level of Consciousness  alert   Follows Commands and Answers Questions 100% of the time   Personal Safety and Judgment intact   Memory intact   Pain Assessment   Patient Currently in Pain Yes, see Vital Sign flowsheet   Integumentary/Edema   Integumentary/Edema Comments mild increased edema in R UE   Posture    Posture Forward head position;Protracted shoulders   Range of Motion (ROM)   ROM Comment NT due to pain, appears WFL per functional mobility   Strength   Strength Comments NT, due to pain/precautions but appears WFL with functional mobility   Bed Mobility   Bed Mobility Comments modA supine<>sit   Transfer Skills   Transfer Comments CGA sit<>stand to FWW   Gait   Gait Comments ambualtes short distance in room back/forth 3x3' with FWW CGA (tx)   Balance   Balance Comments able to stand at FWW with CGA, good static standing with UE support   Sensory Examination   Sensory Perception Comments pt reports some tingling in R LE, light touch intact   General Therapy Interventions   Planned Therapy Interventions balance training;bed mobility training;gait training;neuromuscular re-education;ROM;strengthening;transfer training;home program guidelines;progressive activity/exercise   Clinical Impression   Criteria for Skilled Therapeutic Intervention yes, treatment indicated   PT Diagnosis impaired functional mobility   Influenced by the following impairments pain, weakness, reduced activity tolerance, sensation   Functional limitations due to impairments bed mobility, gait, transfers, stairs, ADLs   Clinical Presentation Evolving/Changing   Clinical Presentation Rationale PMH, clinician impression   Clinical Decision Making (Complexity) Moderate complexity   Therapy Frequency` 5 times/week   Predicted Duration of Therapy Intervention (days/wks) 1 week   Anticipated Equipment Needs at Discharge (none if TCU)   Anticipated Discharge Disposition Transitional Care Facility   Risk & Benefits of therapy have been explained Yes   Patient, Family &  "other staff in agreement with plan of care Yes   Clinical Impression Comments eval complete, tx initiated   [facility] AM-PAC TM \"6 Clicks\"   2016, Trustees of [facility], under license to [employer].  All rights reserved.   6 Clicks Short Forms Basic Mobility Inpatient Short Form   [facility] AM-PAC  \"6 Clicks\" V.2 Basic Mobility Inpatient Short Form   1. Turning from your back to your side while in a flat bed without using bedrails? 3 - A Little   2. Moving from lying on your back to sitting on the side of a flat bed without using bedrails? 3 - A Little   3. Moving to and from a bed to a chair (including a wheelchair)? 3 - A Little   4. Standing up from a chair using your arms (e.g., wheelchair, or bedside chair)? 3 - A Little   5. To walk in hospital room? 3 - A Little   6. Climbing 3-5 steps with a railing? 2 - A Lot   Basic Mobility Raw Score (Score out of 24.Lower scores equate to lower levels of function) 17   Total Evaluation Time   Total Evaluation Time (Minutes) 15     "

## 2018-12-24 NOTE — PLAN OF CARE
NEURO: Solomon Islander speaking and  has been utilized throughout the day. Pt has been AAOX4 since extubation this morning; MAETC; up with assist X1; PERRL; incisional pain well controlled with prn Oxycodone and Dilaudid.     CV: NSR with rare ectopy; T.max 99.1; MAP >65    PULM: extubated this morning and currently on RA with no c/o respiratory distress.  Clear lung sounds.    GI/: passed bedside RN swallow eval and currently tolerating  CLD. Denies nausea but reports some bloating and has been belching frequently. No Flatus/BM and on bowel regimen. Cronin with good UOP especially after receiving one time IV lasix.    SKIN: abdominal incision intact with staples and IVETT; R groin dressing CDI    LINES/DRAINS: Art line and PA catheter removed. MAC  rewired and left with 1 TL internal jugular  line; 2 abdominal KARINE drains with bloody output.    LABS: k+ and Mag replaced, recheck tomorrow morning; HGB, Plts, INR and Fibrinogen stable not requiring  transfusion    PLAN: Pt transferred to  after report given to RN.

## 2018-12-24 NOTE — PLAN OF CARE
Discharge Planner OT   Patient plan for discharge: not discussed  Current status: Pt performed supine BUE endurance and strength building exercises with demo and min cues with rest breaks throughout. Pt with intermittent cough and suction required throughout session. Provided handouts to follow and recommended pt complete 2 x daily ind.   Barriers to return to prior living situation: acute medical needs, post surgical precautions, decreased activity tolerance  Recommendations for discharge: Per plan established by the OT, the recommendation for dc location is Anticipate discharge to home with assist   Rationale for recommendations: pending continued progress with therapies anticipate pt will be able to return to home as pt has good family support, was previously independent and demonstrates good strength with initial active mobility post op.

## 2018-12-24 NOTE — PLAN OF CARE
Tmax 101.5, down to 99 with IV antibiotics. Very diaphoretic. Slightly hypertensive, started norvasc today. Complains of feeling unable to breath deeply and congestion. Is able to cough forcefully enough to clear sputum and using yankeur suction at bedside. Expiratory wheezes heard on auscultation, albuterol nebs ordered prn. Claribel requested to be off oxygen and his sats are 91-92% on RA. Needs encouragement to use IS. Pain in abdomen treated with oxycodone 10mg X 2 this shift and dilaudid IV X 2. Pt was able to participate in PT and OT, stood at bedside but did not walk. -112, insulin gtt discontinued, on subcutaneous novolog. Clear liquid diet, drinking well. No passing of gas yet, MOM given, suppository prn ordered but not yet given. Cronin cath patent, received 40mg IV lasix today. Vit K IV also administered X 1. KARINE drains patent. Lab book updated. Wife at bedside, would like to stay tonight if possible.

## 2018-12-24 NOTE — PROGRESS NOTES
Cross-cover note: 10:35 PM 12/23/2018 12/23/2018    General Surgery Cross Cover Note    Was called by nursing regarding chest pain.    Patient is Vietnamese-speaking. He endorses pain in his lower left ribs and epigastric/LUQ abdomen. He does not feel it is cardiac in nature. He reports his pain is positional and worse with deep inhalation. Denies dyspnea or substernal pressure/chest pain.    B/P: 178/76, T: 98.7, P: 66, R: 18    PE:  A&O x3, NAD  RRR no m/r/g  Breathing non-labored on room air, no reproducible chest wall pain  Abd soft, moderately distended, appropriately tender  Extr. Warm to touch      Plan:  Pt history, exam and EKG are reassuring. No suspicion for acute cardiac event.    - EKG obtained with no acute ST abnormalities  - Pt due for his PRN pain meds/ receiving now and repositioned  - Continue to monitor clinically    Lala Del Rosario DO  PGY-1 Surgery Crosscover  [phone]

## 2018-12-24 NOTE — PROVIDER NOTIFICATION
On-call MD paged due to the fact that the patient was complaining of some chest pain. HR 70s. BP 170s/70s. OVSS on RA. PRN Oxycodone 10 mg given, pt repositioned, and STAT EKG order placed.    MD came up to assess the patient, no additional orders placed. Will continue to monitor and inform MD about changes in the patients condition.

## 2018-12-24 NOTE — PHARMACY-VANCOMYCIN DOSING SERVICE
Pharmacy Vancomycin Note  Date of Service 2018  Patient's  1955   63 year old, male    Indication: Post-operative prophylaxis  Goal Trough Level: 10-15 mg/L  Day of Therapy: 3  Current Vancomycin regimen:  1500 mg IV q12h    Current estimated CrCl = Estimated Creatinine Clearance: 94.1 mL/min (based on SCr of 0.84 mg/dL).    Creatinine for last 3 days  2018: 12:13 AM Creatinine 0.50 mg/dL;  9:21 PM Creatinine 0.65 mg/dL  2018:  4:04 AM Creatinine 0.66 mg/dL  2018:  5:24 AM Creatinine 0.84 mg/dL    Recent Vancomycin Levels (past 3 days)  2018: 11:28 AM Vancomycin Level 14.4 mg/L    Vancomycin IV Administrations (past 72 hours)                   vancomycin (VANCOCIN) 1,500 mg in sodium chloride 0.9 % 250 mL intermittent infusion (mg) 1,500 mg New Bag 18 1149     1,500 mg New Bag 18 2357     1,500 mg New Bag  1212     1,500 mg New Bag  0018                Nephrotoxins and other renal medications (From now, onward)    Start     Dose/Rate Route Frequency Ordered Stop    18 1800  tacrolimus (GENERIC EQUIVALENT) capsule 3 mg      3 mg Oral 2 TIMES DAILY. 18 1235      18 0130  piperacillin-tazobactam (ZOSYN) 3.375 g vial to attach to  mL bag      3.375 g  over 30 Minutes Intravenous EVERY 6 HOURS 18 2117 18 0159    18 0000  vancomycin (VANCOCIN) 1,500 mg in sodium chloride 0.9 % 250 mL intermittent infusion      1,500 mg  over 90 Minutes Intravenous EVERY 12 HOURS 18 2134               Contrast Orders - past 72 hours (72h ago, onward)    None          Interpretation of levels and current regimen:  Trough level is  Therapeutic    Has serum creatinine changed > 50% in last 72 hours: No    Urine output:  good urine output    Renal Function: Stable    Plan:  1.  Continue Current Dose  2.  Pharmacy will check trough levels as appropriate in 1-3 Days.    3. Serum creatinine levels will be ordered daily for the first week of  therapy and at least twice weekly for subsequent weeks.          Eleanor Broadbent, PharmD, [facility]  PGY-2 Infectious Diseases Pharmacy Resident        .

## 2018-12-24 NOTE — PROGRESS NOTES
Transplant Admission Psychosocial Assessment    Patient Name: Loy Limon  : 1955  Age: 63 year old  MRN: [id number]  Date of Initial Social Work Evaluation: 2018    Patient underwent a  donor liver transplant on 2018.  Met with  and a  to update psychosocial assessment and provide education about SW role while inpatient, and to begin discussion of expectations/requirements, caregiver needs and follow up needs post-transplant.     Presenting Information   Living Situation: Mr. Limon is a [age] year old man who lives in a [address] apartment with his spouse.   If not local, plans for short term stay:  N/A  Previous Functional Status: Mr. Lmion was working prior to his transplant. His daughter has been managing his medications.   Cultural/Language/Spiritual Considerations: Mr. Limon is originally from [place], and he speaks Ukrainian. He is Yarsani.     Support System  Primary Support Person His wife Rina and daughter Kaylene.   Other support:  Sons, siblings and mother.   Plan for support in immediate post-transplant period: Family and home care.     Health Care Directive  Decision Maker: Mr. Limon is his own decision-maker.   Alternate Decision Maker: His wife, as next of kin.   Health Care Directive: Provided education    Mental Health/Coping:   History of Mental Health: None reported.    History of Chemical Health: Alcohol abuse with his last drink in 2018.   Current status: He completed a substance use assessment as part of his transplant evaluation, and was referred to an outpatient treatment program at [facility]. I was unable to assess today whether or not he has completed the entire treatment program.   Coping: Seemed to be coping appropriately.   Services Needed/Recommended: Completion of substance use treatment if not already done.     Financial   Income: Wages and Social Security [employer]   Impact of transplant on income: Likely decrease  in income when unable to work.   Insurance and medication coverage: Medicare and [facility] Medicaid. Per [facility], he would have a zero copay for his immunosuppression medications.   Financial concerns: Not fully assessed.   Resources needed: None I am aware of at this time.     Education provided by SW: Social Work role inpatient setting, availability of support groups, parking information.     Assessment and recommendations and plan:  Mr. Limon will likely be able to discharge home when medically appropriate. ABRAHAM Soriano will follow for psychosocial needs and discharge plans while on 7A.     ABRAHAM Marte, [facility]  Pager [phone]

## 2018-12-24 NOTE — PLAN OF CARE
[id number]: Pt transferred from 4A to 7A around 1900. Since arriving on the unit, pt has been afebrile. Pt slightly hypertensive overnight (170s/70s). BP decreased to 150s/60s following administration of pain medications. OVSS on RA the majority of the night, writer of this note did have to put patient on 2L O2 NC this morning due to the fact that the patients O2 sats dropped to the mid-80s. Pt complaining of abdominal pain, R groin pain, and lower back pain throughout the night, PRN Oxycodone given x3, ice packs applied, and patient turned and repositioned. Pt also had one episode of chest pain overnight, MD paged (read provider notification note). Pt on clear liquid diet, no complaints of nausea overnight. BS checks every hour due to the fact that the patient is on an insulin drip. Patients blood sugars have been between , writer of this note using Algorithm 1. Triple Lumen internal jugular, one port infusing with TKO NS at 20 mL/hr and utilized for antibiotic administration, second port infusing with LR at 100 mL/hr, and third port infusing with Dextran at 10 mL/hr. PIV L Forearm SL. PIV R Forearm infusing with TKO NS at 10 mL/hr and insulin drip. Cronin catheter with adequate amounts of urine output, 825 mL emptied this shift. Patient has two KARINE drains on the R side of his abdomen. KARINE #1 putting out small amounts of serosanguineous drainage, 15 mL emptied this shift. KARINE #2 putting out small amounts of serosanguineous drainage, 65 mL emptied this shift. Abdominal incision c/d/I, approximated with staples, scant amount of drainage present, and open to air. Pt is wearing abdominal binder. No BM this shift, pt is passing flatus. Pt up with assist x1, walker, and gait belt, pt has been sleeping on and off throughout the night. Med card and lab book started for the patient. Call light in reach. Will continue to monitor and follow plan of care.

## 2018-12-25 ENCOUNTER — APPOINTMENT (OUTPATIENT)
Dept: CARDIOLOGY | Facility: CLINIC | Age: 63
DRG: 005 | End: 2018-12-25
Attending: TRANSPLANT SURGERY
Payer: MEDICARE

## 2018-12-25 LAB
ALBUMIN SERPL-MCNC: 1.8 G/DL (ref 3.4–5)
ALBUMIN UR-MCNC: 30 MG/DL
ALP SERPL-CCNC: 346 U/L (ref 40–150)
ALT SERPL W P-5'-P-CCNC: 352 U/L (ref 0–70)
ANION GAP SERPL CALCULATED.3IONS-SCNC: 6 MMOL/L (ref 3–14)
ANION GAP SERPL CALCULATED.3IONS-SCNC: 7 MMOL/L (ref 3–14)
APPEARANCE UR: ABNORMAL
AST SERPL W P-5'-P-CCNC: 179 U/L (ref 0–45)
BACTERIA SPEC CULT: ABNORMAL
BACTERIA SPEC CULT: ABNORMAL
BILIRUB DIRECT SERPL-MCNC: 1.1 MG/DL (ref 0–0.2)
BILIRUB SERPL-MCNC: 1.5 MG/DL (ref 0.2–1.3)
BILIRUB UR QL STRIP: ABNORMAL
BUN SERPL-MCNC: 19 MG/DL (ref 7–30)
BUN SERPL-MCNC: 22 MG/DL (ref 7–30)
CALCIUM SERPL-MCNC: 7.2 MG/DL (ref 8.5–10.1)
CALCIUM SERPL-MCNC: 7.7 MG/DL (ref 8.5–10.1)
CHLORIDE SERPL-SCNC: 101 MMOL/L (ref 94–109)
CHLORIDE SERPL-SCNC: 103 MMOL/L (ref 94–109)
CO2 SERPL-SCNC: 27 MMOL/L (ref 20–32)
CO2 SERPL-SCNC: 28 MMOL/L (ref 20–32)
COLOR UR AUTO: YELLOW
CREAT SERPL-MCNC: 0.61 MG/DL (ref 0.66–1.25)
CREAT SERPL-MCNC: 0.72 MG/DL (ref 0.66–1.25)
GFR SERPL CREATININE-BSD FRML MDRD: >90 ML/MIN/{1.73_M2}
GFR SERPL CREATININE-BSD FRML MDRD: >90 ML/MIN/{1.73_M2}
GLUCOSE BLDC GLUCOMTR-MCNC: 184 MG/DL (ref 70–99)
GLUCOSE BLDC GLUCOMTR-MCNC: 225 MG/DL (ref 70–99)
GLUCOSE SERPL-MCNC: 181 MG/DL (ref 70–99)
GLUCOSE SERPL-MCNC: 221 MG/DL (ref 70–99)
GLUCOSE UR STRIP-MCNC: NEGATIVE MG/DL
GRAM STN SPEC: ABNORMAL
HGB UR QL STRIP: ABNORMAL
KETONES UR STRIP-MCNC: NEGATIVE MG/DL
LACTATE BLD-SCNC: 1 MMOL/L (ref 0.7–2)
LEUKOCYTE ESTERASE UR QL STRIP: NEGATIVE
Lab: ABNORMAL
MAGNESIUM SERPL-MCNC: 1.8 MG/DL (ref 1.6–2.3)
MAGNESIUM SERPL-MCNC: 2.3 MG/DL (ref 1.6–2.3)
MUCOUS THREADS #/AREA URNS LPF: PRESENT /LPF
NITRATE UR QL: NEGATIVE
PH UR STRIP: 6 PH (ref 5–7)
PHOSPHATE SERPL-MCNC: 3 MG/DL (ref 2.5–4.5)
POTASSIUM SERPL-SCNC: 3.8 MMOL/L (ref 3.4–5.3)
POTASSIUM SERPL-SCNC: 4.1 MMOL/L (ref 3.4–5.3)
PROT SERPL-MCNC: 5.5 G/DL (ref 6.8–8.8)
RBC #/AREA URNS AUTO: 7 /HPF (ref 0–2)
SODIUM SERPL-SCNC: 136 MMOL/L (ref 133–144)
SODIUM SERPL-SCNC: 137 MMOL/L (ref 133–144)
SOURCE: ABNORMAL
SP GR UR STRIP: 1.02 (ref 1–1.03)
SPECIMEN SOURCE: ABNORMAL
SPECIMEN SOURCE: ABNORMAL
SQUAMOUS #/AREA URNS AUTO: <1 /HPF (ref 0–1)
TROPONIN I SERPL-MCNC: 0.07 UG/L (ref 0–0.04)
TROPONIN I SERPL-MCNC: 0.11 UG/L (ref 0–0.04)
UROBILINOGEN UR STRIP-MCNC: NORMAL MG/DL (ref 0–2)
WBC #/AREA URNS AUTO: 1 /HPF (ref 0–5)

## 2018-12-25 PROCEDURE — 80048 BASIC METABOLIC PNL TOTAL CA: CPT | Performed by: STUDENT IN AN ORGANIZED HEALTH CARE EDUCATION/TRAINING PROGRAM

## 2018-12-25 PROCEDURE — 25000132 ZZH RX MED GY IP 250 OP 250 PS 637: Mod: GY | Performed by: NURSE PRACTITIONER

## 2018-12-25 PROCEDURE — 81001 URINALYSIS AUTO W/SCOPE: CPT | Performed by: STUDENT IN AN ORGANIZED HEALTH CARE EDUCATION/TRAINING PROGRAM

## 2018-12-25 PROCEDURE — 25000128 H RX IP 250 OP 636: Performed by: STUDENT IN AN ORGANIZED HEALTH CARE EDUCATION/TRAINING PROGRAM

## 2018-12-25 PROCEDURE — 25000131 ZZH RX MED GY IP 250 OP 636 PS 637: Mod: GY | Performed by: NURSE PRACTITIONER

## 2018-12-25 PROCEDURE — 12000006 ZZH R&B IMCU INTERMEDIATE UMMC

## 2018-12-25 PROCEDURE — 93308 TTE F-UP OR LMTD: CPT

## 2018-12-25 PROCEDURE — 40000275 ZZH STATISTIC RCP TIME EA 10 MIN

## 2018-12-25 PROCEDURE — 25000132 ZZH RX MED GY IP 250 OP 250 PS 637: Mod: GY | Performed by: STUDENT IN AN ORGANIZED HEALTH CARE EDUCATION/TRAINING PROGRAM

## 2018-12-25 PROCEDURE — A9270 NON-COVERED ITEM OR SERVICE: HCPCS | Mod: GY | Performed by: SURGERY

## 2018-12-25 PROCEDURE — 25000125 ZZHC RX 250: Performed by: STUDENT IN AN ORGANIZED HEALTH CARE EDUCATION/TRAINING PROGRAM

## 2018-12-25 PROCEDURE — 25000128 H RX IP 250 OP 636: Performed by: SURGERY

## 2018-12-25 PROCEDURE — 25000128 H RX IP 250 OP 636: Performed by: NURSE PRACTITIONER

## 2018-12-25 PROCEDURE — 84100 ASSAY OF PHOSPHORUS: CPT | Performed by: NURSE PRACTITIONER

## 2018-12-25 PROCEDURE — 84484 ASSAY OF TROPONIN QUANT: CPT | Performed by: STUDENT IN AN ORGANIZED HEALTH CARE EDUCATION/TRAINING PROGRAM

## 2018-12-25 PROCEDURE — 93005 ELECTROCARDIOGRAM TRACING: CPT

## 2018-12-25 PROCEDURE — 84484 ASSAY OF TROPONIN QUANT: CPT | Performed by: SURGERY

## 2018-12-25 PROCEDURE — A9270 NON-COVERED ITEM OR SERVICE: HCPCS | Mod: GY | Performed by: STUDENT IN AN ORGANIZED HEALTH CARE EDUCATION/TRAINING PROGRAM

## 2018-12-25 PROCEDURE — 93321 DOPPLER ECHO F-UP/LMTD STD: CPT | Mod: 26 | Performed by: INTERNAL MEDICINE

## 2018-12-25 PROCEDURE — 84484 ASSAY OF TROPONIN QUANT: CPT | Performed by: NURSE PRACTITIONER

## 2018-12-25 PROCEDURE — 93010 ELECTROCARDIOGRAM REPORT: CPT | Performed by: INTERNAL MEDICINE

## 2018-12-25 PROCEDURE — 94640 AIRWAY INHALATION TREATMENT: CPT | Mod: 76

## 2018-12-25 PROCEDURE — 80076 HEPATIC FUNCTION PANEL: CPT | Performed by: NURSE PRACTITIONER

## 2018-12-25 PROCEDURE — 87205 SMEAR GRAM STAIN: CPT | Performed by: STUDENT IN AN ORGANIZED HEALTH CARE EDUCATION/TRAINING PROGRAM

## 2018-12-25 PROCEDURE — 93325 DOPPLER ECHO COLOR FLOW MAPG: CPT | Mod: 26 | Performed by: INTERNAL MEDICINE

## 2018-12-25 PROCEDURE — A9270 NON-COVERED ITEM OR SERVICE: HCPCS | Mod: GY | Performed by: PHYSICIAN ASSISTANT

## 2018-12-25 PROCEDURE — 25000132 ZZH RX MED GY IP 250 OP 250 PS 637: Mod: GY | Performed by: SURGERY

## 2018-12-25 PROCEDURE — 83735 ASSAY OF MAGNESIUM: CPT | Performed by: NURSE PRACTITIONER

## 2018-12-25 PROCEDURE — 36592 COLLECT BLOOD FROM PICC: CPT | Performed by: NURSE PRACTITIONER

## 2018-12-25 PROCEDURE — 94799 UNLISTED PULMONARY SVC/PX: CPT

## 2018-12-25 PROCEDURE — 00000146 ZZHCL STATISTIC GLUCOSE BY METER IP

## 2018-12-25 PROCEDURE — 94640 AIRWAY INHALATION TREATMENT: CPT

## 2018-12-25 PROCEDURE — 83735 ASSAY OF MAGNESIUM: CPT | Performed by: STUDENT IN AN ORGANIZED HEALTH CARE EDUCATION/TRAINING PROGRAM

## 2018-12-25 PROCEDURE — 25000128 H RX IP 250 OP 636: Performed by: TRANSPLANT SURGERY

## 2018-12-25 PROCEDURE — A9270 NON-COVERED ITEM OR SERVICE: HCPCS | Mod: GY | Performed by: NURSE PRACTITIONER

## 2018-12-25 PROCEDURE — 80048 BASIC METABOLIC PNL TOTAL CA: CPT | Performed by: NURSE PRACTITIONER

## 2018-12-25 PROCEDURE — 25000132 ZZH RX MED GY IP 250 OP 250 PS 637: Mod: GY | Performed by: PHYSICIAN ASSISTANT

## 2018-12-25 PROCEDURE — 25000131 ZZH RX MED GY IP 250 OP 636 PS 637: Mod: GY | Performed by: SURGERY

## 2018-12-25 PROCEDURE — 93308 TTE F-UP OR LMTD: CPT | Mod: 26 | Performed by: INTERNAL MEDICINE

## 2018-12-25 RX ORDER — ONDANSETRON 2 MG/ML
4 INJECTION INTRAMUSCULAR; INTRAVENOUS EVERY 6 HOURS PRN
Status: DISCONTINUED | OUTPATIENT
Start: 2018-12-25 | End: 2019-01-07 | Stop reason: HOSPADM

## 2018-12-25 RX ORDER — IPRATROPIUM BROMIDE AND ALBUTEROL SULFATE 2.5; .5 MG/3ML; MG/3ML
3 SOLUTION RESPIRATORY (INHALATION)
Status: DISCONTINUED | OUTPATIENT
Start: 2018-12-25 | End: 2018-12-25

## 2018-12-25 RX ORDER — METOPROLOL TARTRATE 1 MG/ML
10 INJECTION, SOLUTION INTRAVENOUS EVERY 5 MIN PRN
Status: DISCONTINUED | OUTPATIENT
Start: 2018-12-25 | End: 2018-12-25

## 2018-12-25 RX ORDER — METOPROLOL TARTRATE 1 MG/ML
5 INJECTION, SOLUTION INTRAVENOUS ONCE
Status: COMPLETED | OUTPATIENT
Start: 2018-12-25 | End: 2018-12-25

## 2018-12-25 RX ORDER — POTASSIUM CHLORIDE 1.5 G/1.58G
40 POWDER, FOR SOLUTION ORAL ONCE
Status: COMPLETED | OUTPATIENT
Start: 2018-12-25 | End: 2018-12-25

## 2018-12-25 RX ORDER — ACETYLCYSTEINE 200 MG/ML
2 SOLUTION ORAL; RESPIRATORY (INHALATION) EVERY 4 HOURS
Status: DISCONTINUED | OUTPATIENT
Start: 2018-12-25 | End: 2018-12-26

## 2018-12-25 RX ORDER — METOPROLOL TARTRATE 1 MG/ML
10 INJECTION, SOLUTION INTRAVENOUS EVERY 6 HOURS
Status: DISCONTINUED | OUTPATIENT
Start: 2018-12-25 | End: 2018-12-26

## 2018-12-25 RX ORDER — ONDANSETRON 2 MG/ML
4 INJECTION INTRAMUSCULAR; INTRAVENOUS ONCE
Status: COMPLETED | OUTPATIENT
Start: 2018-12-25 | End: 2018-12-25

## 2018-12-25 RX ORDER — ALBUTEROL SULFATE 5 MG/ML
2.5 SOLUTION RESPIRATORY (INHALATION)
Status: DISCONTINUED | OUTPATIENT
Start: 2018-12-25 | End: 2018-12-28

## 2018-12-25 RX ORDER — METOPROLOL TARTRATE 1 MG/ML
5 INJECTION, SOLUTION INTRAVENOUS EVERY 5 MIN PRN
Status: COMPLETED | OUTPATIENT
Start: 2018-12-25 | End: 2018-12-25

## 2018-12-25 RX ORDER — IPRATROPIUM BROMIDE AND ALBUTEROL SULFATE 2.5; .5 MG/3ML; MG/3ML
3 SOLUTION RESPIRATORY (INHALATION) EVERY 4 HOURS PRN
Status: DISCONTINUED | OUTPATIENT
Start: 2018-12-25 | End: 2018-12-26

## 2018-12-25 RX ORDER — MAGNESIUM SULFATE HEPTAHYDRATE 40 MG/ML
2 INJECTION, SOLUTION INTRAVENOUS ONCE
Status: COMPLETED | OUTPATIENT
Start: 2018-12-25 | End: 2018-12-25

## 2018-12-25 RX ORDER — METOPROLOL TARTRATE 1 MG/ML
10 INJECTION, SOLUTION INTRAVENOUS ONCE
Status: COMPLETED | OUTPATIENT
Start: 2018-12-25 | End: 2018-12-25

## 2018-12-25 RX ORDER — MAGNESIUM SULFATE 1 G/100ML
2 INJECTION INTRAVENOUS ONCE
Status: COMPLETED | OUTPATIENT
Start: 2018-12-25 | End: 2018-12-25

## 2018-12-25 RX ORDER — FUROSEMIDE 10 MG/ML
40 INJECTION INTRAMUSCULAR; INTRAVENOUS ONCE
Status: COMPLETED | OUTPATIENT
Start: 2018-12-25 | End: 2018-12-25

## 2018-12-25 RX ADMIN — OXYCODONE HYDROCHLORIDE 5 MG: 5 TABLET ORAL at 06:37

## 2018-12-25 RX ADMIN — AMLODIPINE BESYLATE 5 MG: 5 TABLET ORAL at 07:35

## 2018-12-25 RX ADMIN — DOCUSATE SODIUM 286 ML: 50 LIQUID ORAL at 12:27

## 2018-12-25 RX ADMIN — MYCOPHENOLATE MOFETIL 1000 MG: 250 CAPSULE ORAL at 07:35

## 2018-12-25 RX ADMIN — ONDANSETRON HYDROCHLORIDE 4 MG: 2 INJECTION, SOLUTION INTRAMUSCULAR; INTRAVENOUS at 07:58

## 2018-12-25 RX ADMIN — OXYCODONE HYDROCHLORIDE 5 MG: 5 TABLET ORAL at 19:03

## 2018-12-25 RX ADMIN — MYCOPHENOLATE MOFETIL 1000 MG: 250 CAPSULE ORAL at 19:04

## 2018-12-25 RX ADMIN — FUROSEMIDE 40 MG: 10 INJECTION, SOLUTION INTRAVENOUS at 00:52

## 2018-12-25 RX ADMIN — METOPROLOL TARTRATE 5 MG: 1 INJECTION, SOLUTION INTRAVENOUS at 00:50

## 2018-12-25 RX ADMIN — SULFAMETHOXAZOLE AND TRIMETHOPRIM 1 TABLET: 400; 80 TABLET ORAL at 07:35

## 2018-12-25 RX ADMIN — ACETYLCYSTEINE 2 ML: 200 SOLUTION ORAL; RESPIRATORY (INHALATION) at 20:12

## 2018-12-25 RX ADMIN — ACETYLCYSTEINE 2 ML: 200 SOLUTION ORAL; RESPIRATORY (INHALATION) at 16:06

## 2018-12-25 RX ADMIN — MAGNESIUM SULFATE IN DEXTROSE 2 G: 10 INJECTION, SOLUTION INTRAVENOUS at 09:30

## 2018-12-25 RX ADMIN — ONDANSETRON 4 MG: 2 INJECTION INTRAMUSCULAR; INTRAVENOUS at 20:19

## 2018-12-25 RX ADMIN — SENNOSIDES AND DOCUSATE SODIUM 2 TABLET: 8.6; 5 TABLET ORAL at 19:03

## 2018-12-25 RX ADMIN — IPRATROPIUM BROMIDE AND ALBUTEROL SULFATE 3 ML: .5; 3 SOLUTION RESPIRATORY (INHALATION) at 20:12

## 2018-12-25 RX ADMIN — ONDANSETRON 4 MG: 2 INJECTION INTRAMUSCULAR; INTRAVENOUS at 13:43

## 2018-12-25 RX ADMIN — FLUCONAZOLE 200 MG: 2 INJECTION, SOLUTION INTRAVENOUS at 21:20

## 2018-12-25 RX ADMIN — METOPROLOL TARTRATE 10 MG: 5 INJECTION, SOLUTION INTRAVENOUS at 19:09

## 2018-12-25 RX ADMIN — SENNOSIDES AND DOCUSATE SODIUM 2 TABLET: 8.6; 5 TABLET ORAL at 07:35

## 2018-12-25 RX ADMIN — TACROLIMUS 3 MG: 1 CAPSULE ORAL at 07:34

## 2018-12-25 RX ADMIN — METOPROLOL TARTRATE 10 MG: 5 INJECTION, SOLUTION INTRAVENOUS at 12:44

## 2018-12-25 RX ADMIN — VALGANCICLOVIR 450 MG: 450 TABLET, FILM COATED ORAL at 07:35

## 2018-12-25 RX ADMIN — ASPIRIN 325 MG: 325 TABLET, DELAYED RELEASE ORAL at 07:35

## 2018-12-25 RX ADMIN — PHYTONADIONE 10 MG: 10 INJECTION, EMULSION INTRAMUSCULAR; INTRAVENOUS; SUBCUTANEOUS at 08:01

## 2018-12-25 RX ADMIN — METOPROLOL TARTRATE 10 MG: 5 INJECTION, SOLUTION INTRAVENOUS at 06:25

## 2018-12-25 RX ADMIN — METHYLPREDNISOLONE SODIUM SUCCINATE 50 MG: 125 INJECTION, POWDER, LYOPHILIZED, FOR SOLUTION INTRAMUSCULAR; INTRAVENOUS at 07:51

## 2018-12-25 RX ADMIN — MAGNESIUM HYDROXIDE 30 ML: 400 SUSPENSION ORAL at 12:43

## 2018-12-25 RX ADMIN — AMIODARONE HYDROCHLORIDE 0.5 MG/MIN: 50 INJECTION, SOLUTION INTRAVENOUS at 09:52

## 2018-12-25 RX ADMIN — AMIODARONE HYDROCHLORIDE 0.5 MG/MIN: 50 INJECTION, SOLUTION INTRAVENOUS at 20:32

## 2018-12-25 RX ADMIN — METOPROLOL TARTRATE 5 MG: 1 INJECTION, SOLUTION INTRAVENOUS at 00:30

## 2018-12-25 RX ADMIN — SODIUM CHLORIDE, POTASSIUM CHLORIDE, SODIUM LACTATE AND CALCIUM CHLORIDE: 600; 310; 30; 20 INJECTION, SOLUTION INTRAVENOUS at 12:55

## 2018-12-25 RX ADMIN — VANCOMYCIN HYDROCHLORIDE 1500 MG: 10 INJECTION, POWDER, LYOPHILIZED, FOR SOLUTION INTRAVENOUS at 00:52

## 2018-12-25 RX ADMIN — METOPROLOL TARTRATE 5 MG: 1 INJECTION, SOLUTION INTRAVENOUS at 03:48

## 2018-12-25 RX ADMIN — MAGNESIUM SULFATE HEPTAHYDRATE 2 G: 40 INJECTION, SOLUTION INTRAVENOUS at 02:21

## 2018-12-25 RX ADMIN — METOPROLOL TARTRATE 10 MG: 5 INJECTION, SOLUTION INTRAVENOUS at 16:38

## 2018-12-25 RX ADMIN — IPRATROPIUM BROMIDE AND ALBUTEROL SULFATE 3 ML: .5; 3 SOLUTION RESPIRATORY (INHALATION) at 16:06

## 2018-12-25 RX ADMIN — AMIODARONE HYDROCHLORIDE 150 MG: 1.5 INJECTION, SOLUTION INTRAVENOUS at 09:15

## 2018-12-25 RX ADMIN — OMEPRAZOLE 20 MG: 20 CAPSULE, DELAYED RELEASE ORAL at 07:35

## 2018-12-25 RX ADMIN — POTASSIUM CHLORIDE 40 MEQ: 1.5 POWDER, FOR SOLUTION ORAL at 02:20

## 2018-12-25 RX ADMIN — POLYETHYLENE GLYCOL 3350 17 G: 17 POWDER, FOR SOLUTION ORAL at 07:35

## 2018-12-25 RX ADMIN — TACROLIMUS 3 MG: 1 CAPSULE ORAL at 19:03

## 2018-12-25 RX ADMIN — VANCOMYCIN HYDROCHLORIDE 1500 MG: 10 INJECTION, POWDER, LYOPHILIZED, FOR SOLUTION INTRAVENOUS at 12:43

## 2018-12-25 ASSESSMENT — ACTIVITIES OF DAILY LIVING (ADL)
ADLS_ACUITY_SCORE: 12
ADLS_ACUITY_SCORE: 12
ADLS_ACUITY_SCORE: 13
ADLS_ACUITY_SCORE: 10

## 2018-12-25 NOTE — PROVIDER NOTIFICATION
Cross cover notified of temperature of 102.9. Awaiting response to page.  Applied ice and cold wash cloths.

## 2018-12-25 NOTE — PLAN OF CARE
"/87 (BP Location: Left arm)   Pulse 66   Temp 99.2  F (37.3  C) (Oral)   Resp 26   Ht 1.702 m (5' 7\")   Wt 90.6 kg (199 lb 11.2 oz)   SpO2 93%   BMI 31.28 kg/m      Patient tachycardic, tachypnea, hypertensive (236/98), IV prn 20 mg labetalol given, on 2 LPM NC, T-max 102.9. Managing fevers with ice and cold wash cloth applied.  & 226, sliding scale and carb coverage given. Abdominal pressure managed with prn dilaudid x1 and oxy x1. Shortness of breath managed by getting up to chair, IS, o2 therapy, oral airway clearance via yonker, and prn mucinex given. Tolerating clear liquid diet with fair appetite. Right internal jugular infusing IVF 50 ml/hr LR, TKO w/abx and. Dextran discontinued ~1900. Right internal jugular dressing needs to be changed d/t diaphoresis during fever. Bilat PIV-SL. No BM, - flatus, burping frequently, PM senna given. Cota 725 ml output, patent, cota cares done. R KARINE's (#1 25 ml) & (#2 100 ml). R inguinal hernia dressing intact. Incision, stapled, cleansed. Abd binder on, up to chair with assist of one and GB. Chest and abdominal x-ray, blood cultures drawn and LA ordered per sepsis protocol. Updated daughter via phone, NOC nurse to call with updates. Plan to evaluate with stat EKG d/t tachycardia, apply O2 mask to comfort, evaluate cause of SOB and heart rate changes via rapid response during handoff. Will continue with POC and notify MD with changes or concerns.    "

## 2018-12-25 NOTE — PROVIDER NOTIFICATION
Time of notification: 0945, 1015, 1030, 1045 , 11:45 AM  Provider notified: Cassius Leyva MD  Patient status: pt having emesis and nausea, BP reached  160 SBP, HR max 124, irregular, pt remains afib, symptomatic having SOB, dizziness, denied chest pain  Temp:  [98.6  F (37  C)-102.9  F (39.4  C)] 99.1  F (37.3  C)  Pulse:  [111] 111  Heart Rate:  [] 108  Resp:  [18-30] 24  BP: (142-236)/() 152/97  SpO2:  [91 %-100 %] 100 %  Orders received: verbal order of 4mg IV zofran q6, give scheduled metoprolol for blood pressures

## 2018-12-25 NOTE — PLAN OF CARE
Patient with RRT called as noted in writer's previous progress note. BP, O2 more stable, now on RA. Still tachy, but now improving with averages 110-120s after 3rd metorpolol 5 mg dose. TMax 99.3. Diaphoretic, but improving. Patient feels less feverish. Breathing comfortably, though discomfort of the back. Refused pain medication so far. No nausea or vomiting. BG ACHS - 225 overnight. Potassium and Mg low normal, Lasix was also given overnight, ordered for replacement - gave PO KCL 40 meq and magnesium sulfate 2 gm IV. Bilateral PIV SL. RIJ - dressing changed, infusing LR at 50. Cronin good output. No BM and not passing gas yet.

## 2018-12-25 NOTE — PROVIDER NOTIFICATION
12/25/18 0000   Call Information   Date of Call 12/24/18   Time of Call 2343   Name of person requesting the team Jonah PRUITT   Title of person requesting team RN   RRT Arrival time 2347   Time RRT ended 0045   Reason for call   Type of RRT Adult   Primary reason for call Nurse is concerned/worried   Was patient transferred from the ED, ICU, or PACU within last 24 hours prior to RRT call? Yes   SBAR   Situation increased 's-140's. increased work of breathing   Background Etoh cirrhosis, hepatocellular carcinoma. POD#2 Liver transplant.    Notable History/Conditions Transplant;Recent surgery;Hypertension;Cancer   Assessment pt is Zambian speaking. alert. 's-130's. bp 150's-160's/90's. abdonimal breathing. RR 26-28.0   Interventions ECG;Labs;Meds;O2 per N/C or mask   Patient Outcome   Patient Outcome Stabilized on unit   RRT Team   Date Attending Physician notified 12/25/18   Physician(s) Lala PRUITT   RT Eriberto KENNEDY   Post RRT Intervention Assessment   Post RRT Assessment Stable/Improved   Date Follow Up Done 12/25/18   Time Follow Up Done 0300   Comments pt is sleeping comfortably

## 2018-12-25 NOTE — PROVIDER NOTIFICATION
Time of notification: 4:15 PM  Provider notified: Dr. Jackson  Reason for notification: Pt /92, re-check 184/120, no available meds for intervention.  Temp:  [98.2  F (36.8  C)-102.9  F (39.4  C)] 98.2  F (36.8  C)  Pulse:  [111] 111  Heart Rate:  [] 84  Resp:  [20-30] 20  BP: (142-236)/() 184/120  SpO2:  [92 %-100 %] 99 %  Orders received: IV Metoprolol 10mg one time. Will continue to monitor.

## 2018-12-25 NOTE — PLAN OF CARE
Patient transferred to 6B for management of  new episode of afib overnight. Patient is cooperative, anxious at times, biggest complaint is abdominal distention and fullness. He is belching, mild nausea unrelieved with PRN Zofran 4 mg IV, small emesis X1. Has orders for pink lady, Senna and Miralax given this morning. Cronin in place, UA and sputum sent this morning. KARINE X2, abdominal incision is clean and intact. Vitals are stable, heart rate low 100's and irregular, he denies chest pain, endorses mild SOB with his abdominal fullness. His wife is in the room, updated on plan of care with interpretor, message left for his daughter by CN.

## 2018-12-25 NOTE — PROGRESS NOTES
Transplant Surgery  Inpatient Daily Progress Note  12/25/2018    Assessment & Plan: 64 yo M hx Laennec's cirrhosis with HCC (treated), MELD 14. S/p OLT (52 yo M donor import DBD) with right inguinal hernia repair with biologic mesh 12/22/18. Notable for small arterial anastomosis (donor common hepatic with atherosclerosis to recipient replaced right hepatic)    Graft function:good, stable. T Bili 0.8, improving transaminitis (, ). INR 1.3, no FFP needed. Acidosis corrected prior to leaving OR. Vitamin K 10mg daily X3 days  Immunosuppression management: No change cellcept 1000 BID, steroid taper, tacrolimus- goal level 5 . Current level <3- will increase. Complexity of management:High. Contributing factors: anemia  Hematology: Hgb stable, 8.7. HDS. No transfusion intra/postop  -Hepatic artery anticoagulation: dextran X48 hrs, aspirin 325 starting today. Will need heparin and warfarin long term  Cardiorespiratory: Stable on room air. Has some secretions- adding nebulizers to help cough. Pulm toilet.   -Atrial fibrillation with RVR overnight. Started on scheduled metoprolol and amio bolus/drip for better control. trops low/negative, echo with sequelae of hypertension (dilated left ventricle but preserved EF) lytes unremarkable. Likely related to fluid shifts. Diuresing.   SBP 170s, likely pain related. If persists would add low dose antihypertensive as needed  GI/Nutrition: regular diet. Awaiting return of bowel function. Monitor sarah counts- may need feeding tube due to low albumin preop. Currently awaiting return of bowel function- somewhat distended. Enema today.   Endocrine: insulin drip while on steroids  Fluid/Electrolytes: MIVF: cut to 50mL/hr, diurese with lasix. Saline lock when taking good PO.   : continue cota for I/O monitoring and for diuresis.   Infectious disease: continue abx (vanc/zosyn/diflucan) given herniorraphy with mesh. CUltures given fevers  Prophylaxis: DVT, fall, GI,  fungal  Disposition: 7A care, needs PT/OT    Medical Decision Making: High  Admit 94260 (high level decision making)    IRVIN/Fellow/Resident Provider: Sebastián Agudelo     Faculty: Shaila De La Cruz MD    __________________________________________________________________  Transplant History: Admitted 12/21/2018 for DDLT and RIH repair (XenMatrix mesh).   The patient has a history of liver failure due to Laennec's cirrhosis with HCC.    12/22/2018 (Liver), Postoperative day: 3     Interval History: History is obtained from the patient with assistance of   Overnight events: atrial fibrillation with RVR as above. HD stable but some shortness of breath due to mass effect from distended stomach. No other complaints.    ROS:   A 10-point review of systems was negative except as noted above.    Curent Meds:    acetylcysteine  2 mL Nebulization Q4H     amLODIPine  5 mg Oral Daily     aspirin  325 mg Oral Daily     fluconazole  200 mg Intravenous Q24H     insulin aspart   Subcutaneous TID AC     insulin aspart  1-7 Units Subcutaneous TID AC     insulin aspart  1-5 Units Subcutaneous At Bedtime     magnesium hydroxide  30 mL Oral Daily     metoprolol  10 mg Intravenous Q6H     mycophenolate  1,000 mg Oral BID IS     omeprazole  20 mg Oral QAM AC     phytonadione  10 mg Intravenous Daily     polyethylene glycol  17 g Oral Daily     [START ON 12/26/2018] predniSONE  25 mg Oral Once    Followed by     [START ON 12/27/2018] predniSONE  10 mg Oral or Feeding Tube Once     senna-docusate  2 tablet Oral or Feeding Tube BID     sodium chloride (PF)  3 mL Intravenous Q8H     sulfamethoxazole-trimethoprim  1 tablet Oral or Feeding Tube Daily     tacrolimus  3 mg Oral BID IS     valGANciclovir  450 mg Oral Daily    Or     valGANciclovir  450 mg Oral or NG Tube Daily     vancomycin (VANCOCIN) IV  1,500 mg Intravenous Q12H       Physical Exam:     Admit Weight: 84.6 kg (186 lb 9.6 oz)    Current Vitals:   BP (!) 184/120 (BP  "Location: Left arm)   Pulse 111   Temp 98.2  F (36.8  C) (Oral)   Resp 20   Ht 1.702 m (5' 7\")   Wt 90.6 kg (199 lb 11.2 oz)   SpO2 99%   BMI 31.28 kg/m      CVP (mmHg): 13 mmHg    Vital sign ranges:    Temp:  [98.2  F (36.8  C)-102.9  F (39.4  C)] 98.2  F (36.8  C)  Pulse:  [111] 111  Heart Rate:  [] 84  Resp:  [20-30] 20  BP: (142-236)/() 184/120  SpO2:  [92 %-100 %] 99 %  Patient Vitals for the past 24 hrs:   BP Temp Temp src Pulse Heart Rate Resp SpO2   12/25/18 1606 -- -- -- -- -- -- 99 %   12/25/18 1535 (!) 184/120 98.2  F (36.8  C) Oral -- 84 20 98 %   12/25/18 1348 165/84 98.7  F (37.1  C) Axillary -- 72 22 99 %   12/25/18 1302 166/72 98.7  F (37.1  C) Axillary -- 92 22 97 %   12/25/18 1200 (!) 154/94 98.3  F (36.8  C) Axillary -- 105 24 100 %   12/25/18 1100 (!) 152/97 99.1  F (37.3  C) Axillary -- 108 24 100 %   12/25/18 1020 152/84 99.6  F (37.6  C) Axillary -- 124 24 100 %   12/25/18 0954 (!) 160/97 99.1  F (37.3  C) Axillary -- 102 28 100 %   12/25/18 0939 (!) 148/103 99.6  F (37.6  C) Axillary -- 121 24 --   12/25/18 0924 (!) 173/116 99.4  F (37.4  C) Axillary -- 123 26 98 %   12/25/18 0905 (!) 156/129 99.9  F (37.7  C) Oral -- 122 30 99 %   12/25/18 0800 -- -- -- -- -- 28 --   12/25/18 0723 162/83 99.5  F (37.5  C) Oral 111 -- 24 97 %   12/25/18 0626 142/68 -- -- -- 121 -- --   12/25/18 0458 -- -- -- -- 116 -- --   12/25/18 0405 (!) 147/93 99.3  F (37.4  C) Oral -- 110 24 94 %   12/25/18 0349 155/89 98.9  F (37.2  C) Oral -- 121 22 95 %   12/25/18 0100 155/79 -- -- -- 122 -- --   12/25/18 0053 (!) 155/102 -- -- -- 121 -- 94 %   12/25/18 0030 153/90 -- -- -- 126 -- 93 %   12/25/18 0025 (!) 160/93 -- -- -- 132 -- 94 %   12/25/18 0000 152/87 -- -- -- 119 26 --   12/24/18 2350 -- -- -- -- 142 -- 93 %   12/24/18 2330 (!) 160/96 -- -- -- -- 28 --   12/24/18 2301 166/84 99.2  F (37.3  C) Oral -- 105 26 92 %   12/24/18 2230 (!) 210/94 101.2  F (38.4  C) Oral -- 95 20 97 %   12/24/18 2118 " (!) 214/97 101.9  F (38.8  C) Oral -- 100 20 --   12/24/18 2100 -- 102.9  F (39.4  C) Oral -- -- -- --   12/24/18 1930 (!) 209/94 -- -- -- -- -- --   12/24/18 1912 192/82 -- -- -- 95 -- --   12/24/18 1907 (!) 236/98 100.8  F (38.2  C) Oral -- 93 20 96 %     General Appearance: in no apparent distress.   Skin: normal, warm, dry, No rashes, induration, or jaundice  Heart: regular rate and rhythm  Lungs: without wheezes, clear to ausculattion  Abdomen: abd soft, slightly distended. Incision c/d/i, staples in place. KARINE X2- serosang. 190mL from inguinal drain, 54 from abdominal.   : The genitalia are edematous with some swelling in right inguinal hernia repair site  Extremities: edema: present bilaterally. 2+. There is no skin breakdown.  Neurologic: awake, alert and oriented. Tremor absent.. Asterixis: absent.    Frailty Scores     Frailty Scores 5/29/2018    Final Score Not Frail    Final Score Number 0          Data:   CMP  Recent Labs   Lab 12/25/18  0632 12/25/18  0000 12/24/18  0524 12/23/18  0404  12/22/18 2010 12/22/18  0013    137 140 144   < > 139   < > 139   POTASSIUM 4.1 3.8 3.9 3.6   < > 3.5   < > 4.5   CHLORIDE 101 103 108 112*   < >  --   --  109   CO2 28 27 26 24   < >  --   --  26   * 181* 125* 107*   < > 172*   < > 167*   BUN 19 22 28 18   < >  --   --  10   CR 0.61* 0.72 0.84 0.66   < >  --   --  0.50*   GFRESTIMATED >90 >90 >90 >90   < >  --   --  >90   GFRESTBLACK >90 >90 >90 >90   < >  --   --  >90   YELENA 7.7* 7.2* 7.0* 6.9*   < >  --   --  7.7*   ICAW  --   --   --  4.5  --  4.8   < >  --    MAG 2.3 1.8 1.9 1.7  --   --   --  2.0   PHOS 3.0  --  4.4 4.4  --   --   --  2.8   AMYLASE  --   --   --  46  --   --   --  67   LIPASE  --   --   --  93  --   --   --   --    ALBUMIN 1.8*  --  1.6* 1.6*   < >  --   --  2.5*   BILITOTAL 1.5*  --  0.8 1.7*   < >  --   --  1.7*   ALKPHOS 346*  --  171* 166*   < >  --   --  408*   *  --  455* 773*   < >  --   --  48*   *  --  468*  602*   < >  --   --  40    < > = values in this interval not displayed.     CBC  Recent Labs   Lab 12/24/18  0524 12/23/18  1204  12/22/18  2121   HGB 8.0* 8.7*   < > 9.3*   WBC 9.3 11.3*   < > 8.0   PLT 62* 53*   < > 66*   A1C  --   --   --  5.8*    < > = values in this interval not displayed.     COAGS  Recent Labs   Lab 12/23/18  1204 12/23/18  0404 12/22/18  2121 12/22/18  0013   INR 1.36* 1.61* 2.04* 1.77*   PTT  --   --  40* 38*      Urinalysis  Recent Labs   Lab Test 12/25/18  0732 12/22/18  0034  07/19/18  1133   COLOR Yellow Yellow   < > Yellow   APPEARANCE Slightly Cloudy Clear   < > Clear   URINEGLC Negative Negative   < > Negative   URINEBILI Small* Negative   < > Negative   URINEKETONE Negative Negative   < > Negative   SG 1.021 1.012   < > 1.009   UBLD Moderate* Trace*   < > Negative   URINEPH 6.0 6.5   < > 8.0*   PROTEIN 30* Negative   < > Negative   NITRITE Negative Negative   < > Negative   LEUKEST Negative Negative   < > Negative   RBCU 7* 3*   < >  --    WBCU 1 1   < >  --    UTPG  --   --   --  Unable to calculate due to low value    < > = values in this interval not displayed.     Virology:  No results found for: CSPEC, CMVPCR, CMQNT, CMVQ, EBVDN, CMVM, CMIG, CMVG, CMIGG, CMLTX, EBVIGG, EBIGG, EBVAGN, HCVAB, HBSAB, BKRES

## 2018-12-25 NOTE — PROVIDER NOTIFICATION
Time of notification: 1:23 PM  Provider notified: surgery barby thomson & Autumn HARKINS & MD Cassius  Patient status: order med to loosen phlegm? Also need to reorder sputum sample, speciment contaminated  Temp:  [98.3  F (36.8  C)-102.9  F (39.4  C)] 98.7  F (37.1  C)  Pulse:  [111] 111  Heart Rate:  [] 92  Resp:  [18-30] 22  BP: (142-236)/() 166/72  SpO2:  [91 %-100 %] 97 %  Orders received: nebs ordered

## 2018-12-25 NOTE — PROGRESS NOTES
"@2330. Shift change, patient noted to have tachycardia into the 140s - averaging 120-140s. Per evening shift nurse, patient received Labetolol @ 2250. Upon arrival into room with evening shift nurse, patient stated \"I need oxygen\". O2 sating at 94% on RA at that time. O2 with oxymask applied. Abdominal labored breathing, no chest pain. EKG stat, RRT, and MD called. Patient is [place] speaking, MD spoke in [place] with patient during assessment, no chest pain confirmed. BP lowered, EKG showed aflutter with variable AV block, repeat EKG progressed to afib. Patient with notable anxiety, which began to calm during RR. Metoprolol 5 mg x2 ordered and given. Patient notes feeling better and was on RA.    Lactate 0.9, Troponin 0.113, MD was made aware - repeat troponin in the morning.    Lasix as well as K+ and Mg+ replacement ordered and given.    @0300, MD made aware that patient still Pulse 120-130 average with occasional 140s. x1 Metoprolol 5 mg IV given. Patient appeared comfortable and breathing in the 93-95% on RA.  "

## 2018-12-25 NOTE — PLAN OF CARE
Admission          12/21/2018 10:50 PM  -----------------------------------------------------------  Reason for admission: new onset Afib RVR  Primary team notified of pt arrival.  Admitted from: 7A  Via: bed  Accompanied by: family  Belongings: Placed with patient  Admission Profile: complete  Teaching: orientation to unit and call light- call light within reach, call don't fall, use of console, meal times, when to call for the RN, and enforced importance of safety   Access: PIV  Telemetry:Placed on pt  Ht./Wt.: complete  2 RN Skin Assessment Completed By: Yesenia Rn and Tahira Rn   Pt status: pt diaphoretic, afib, lightheaded, dizzy, feeling SOB, having nausea and emesis    Temp:  [98.2  F (36.8  C)-102.9  F (39.4  C)] 98.2  F (36.8  C)  Pulse:  [111] 111  Heart Rate:  [] 84  Resp:  [20-30] 20  BP: (142-236)/() 184/120  SpO2:  [92 %-100 %] 99 %      Neuro: disoriented to place. Hungarian speaking.  utilized. Speaks very little english  Cardiac: pt in afib upon admission at 0930 until 12:10pm. Converted to SR at 1210 with HR 80s. SBP elevated 160s systolic, md aware. Scheduled metoprolol given with little improvement. MDs paged. Remains on amiodarone gtt. Dizziness and lightheadedness improved later end of shift.   Respiratory: Sating 100% on 1 L NC for comfort. Pt feeling short of breath. Tachypneic, RR 22-30. Otherwise RA. Lung sounds crackles throughout. Cough, good productive. Pt uses younker to suction secretions. Pt feels like he has phlegm stuck in throat that he cannot suction out, nebs ordered.   GI/: BS hypoactive, abdomen distended, pt having abdominal fullness/discomfort. Pink lady enema given. 4 golfball sized stools passed. Pt stated abdomen felt slightly better after passing stool. Cronin patent with good christian output  Diet/appetite: clear liquid diet, pt had emesisx4, IV zofran given with moderate relief. Still feels slightly queasy   Activity:  Assist of 2 with  repositioning  Pain: pt complained of incisional pain, declined pharm interventions. Splinting encouraged  Skin: diaphoretic, abdominal and groin incisions CDI.    LDA's: R triple lumen internal jugular infusing. R and L PIV SL, 2  R KARINE to bulb suction with good serosanguinous output    Plan: sputum sample contaminated, needed to reorder . MD paged. Blood sugars elevated 262, covered with sliding scale.

## 2018-12-25 NOTE — PROGRESS NOTES
"Cross-cover note: 12:07 AM 12/25/2018 12/25/2018    General Surgery Cross Cover Note    Was called by nursing regarding patient had increased work of breathing and tachycardic. Rapid response called and not desating but due to increased work of breathing placed on 3.5L Oxymask. EKG obtained with new onset afib.    Patient evaluated at bedside. Endorses difficulty breathing and dizziness. Denies chest pain. No longer having significant secretions, but feels that he is \"swollen.\" Weaned off the oxymask to room air and patient felt less restricted and able to take deeper breaths, feeling mildly improved and less labored effort.      /90 (BP Location: Left arm)   Pulse 66   Temp 99.2  F (37.3  C) (Oral)   Resp 26   Ht 1.702 m (5' 7\")   Wt 90.6 kg (199 lb 11.2 oz)   SpO2 93%   BMI 31.28 kg/m      AFib rates 120s-140s on tele    PE:  A&O x3,   Abdominal breathing, increased work of breathing, Sat >90% on room air, lungs mild crackles bilaterally but improved from earlier  Abd soft, moderately distended, appropriate incisional tenderness  Extr. Warm to touch, trace edema to bilateral lower extremities  Incisions clean, dry, intact    Plan:  New onset afib likely secondary to hypervolemia.   -- Stat Metoprolol 5mg IV q5 x2  -- Stat EKG and Troponin  -- Routine TTE  -- dane Metoprolol 10mg IV q6h  -- Lasix 40mg IV x1  -- BMP and Mg, replace K and Mg  -- continue to monitor on 7A med/surg telemetry    On-call jr resident made aware. Discussed with Fellow, Dr. Leyva.    Lala Del Rosario,DO  PGY-1 Surgery Crosscover  [phone]       "

## 2018-12-25 NOTE — PROVIDER NOTIFICATION
Provider notified of BP of 209/94. Provider arrived at bedside, assessed and ordered prn labetalol.  Patient also c/o SOB, and trouble clearing cough, prn mucinex ordered.  Will give when arrived from pharmacy.

## 2018-12-25 NOTE — PROGRESS NOTES
Cross-cover note: 10:41 PM 12/24/2018 12/24/2018    General Surgery Cross Cover Note    Was called by nursing regarding Was called by nursing regarding increased work of breathing, abdominal distention, Fever ~103, and hypertension SBP 200s.    Pt endorses fever, cough, and lots of clear mucus secretions which he is using a yanker to suction frequently. Endorses increased dyspnea as a result although he remains on 1-2L O2. He also reports increasing abdominal distention, pressure and discomfort. Not passing flatus or BMs. Tolerating his diet without nausea or vomiting. Incisional abdominal pain well controlled.       B/P: 214/97, T: 101.9, P: 66, R: 20    PE:  A&O x3, ill-appearing, seated in chair at bedside  Increased work of breathing, crackles bilaterally  Abd soft, moderately distended, appropriately tender, incision c/d/i  Extr. Warm to touch      Plan:  - Stat Abdominal XR- demonstrates dilated small bowel  - Stat CXR demonstrates increased bibasilar opacities  - Infectious workup: UA/UC, Blood cx, Sputum cx  - Already on Vanc/Zosyn/Diflucan for broad infectious coverage  - Continue with ice packs for fevers down from 102.9 to 101.9.  - Ordered Mucinex for cough/secretions  - PRN IV Labetalol for Hypertension  - No IV Lasix at this time as his O2 requirements have not increased    Discussed with Fellow, Dr. Autumn Del Rosario,DO  PGY-1 Surgery Crosscover  [phone]

## 2018-12-26 ENCOUNTER — DOCUMENTATION ONLY (OUTPATIENT)
Dept: TRANSPLANT | Facility: CLINIC | Age: 63
End: 2018-12-26

## 2018-12-26 ENCOUNTER — APPOINTMENT (OUTPATIENT)
Dept: GENERAL RADIOLOGY | Facility: CLINIC | Age: 63
DRG: 005 | End: 2018-12-26
Attending: NURSE PRACTITIONER
Payer: MEDICARE

## 2018-12-26 ENCOUNTER — APPOINTMENT (OUTPATIENT)
Dept: ULTRASOUND IMAGING | Facility: CLINIC | Age: 63
DRG: 005 | End: 2018-12-26
Attending: TRANSPLANT SURGERY
Payer: MEDICARE

## 2018-12-26 ENCOUNTER — ANESTHESIA EVENT (OUTPATIENT)
Dept: SURGERY | Facility: CLINIC | Age: 63
DRG: 005 | End: 2018-12-26
Payer: MEDICARE

## 2018-12-26 ENCOUNTER — APPOINTMENT (OUTPATIENT)
Dept: ULTRASOUND IMAGING | Facility: CLINIC | Age: 63
DRG: 005 | End: 2018-12-26
Attending: NURSE PRACTITIONER
Payer: MEDICARE

## 2018-12-26 ENCOUNTER — ANESTHESIA (OUTPATIENT)
Dept: SURGERY | Facility: CLINIC | Age: 63
DRG: 005 | End: 2018-12-26
Payer: MEDICARE

## 2018-12-26 LAB
ALBUMIN SERPL-MCNC: 1.9 G/DL (ref 3.4–5)
ALP SERPL-CCNC: 482 U/L (ref 40–150)
ALT SERPL W P-5'-P-CCNC: 283 U/L (ref 0–70)
ANION GAP SERPL CALCULATED.3IONS-SCNC: 8 MMOL/L (ref 3–14)
AST SERPL W P-5'-P-CCNC: 82 U/L (ref 0–45)
BILIRUB DIRECT SERPL-MCNC: 1.3 MG/DL (ref 0–0.2)
BILIRUB SERPL-MCNC: 1.7 MG/DL (ref 0.2–1.3)
BUN SERPL-MCNC: 25 MG/DL (ref 7–30)
CALCIUM SERPL-MCNC: 7.4 MG/DL (ref 8.5–10.1)
CHLORIDE SERPL-SCNC: 99 MMOL/L (ref 94–109)
CO2 SERPL-SCNC: 27 MMOL/L (ref 20–32)
CREAT SERPL-MCNC: 0.75 MG/DL (ref 0.66–1.25)
ERYTHROCYTE [DISTWIDTH] IN BLOOD BY AUTOMATED COUNT: 14.4 % (ref 10–15)
GFR SERPL CREATININE-BSD FRML MDRD: >90 ML/MIN/{1.73_M2}
GLUCOSE BLDC GLUCOMTR-MCNC: 215 MG/DL (ref 70–99)
GLUCOSE BLDC GLUCOMTR-MCNC: 216 MG/DL (ref 70–99)
GLUCOSE BLDC GLUCOMTR-MCNC: 225 MG/DL (ref 70–99)
GLUCOSE BLDC GLUCOMTR-MCNC: 239 MG/DL (ref 70–99)
GLUCOSE BLDC GLUCOMTR-MCNC: 254 MG/DL (ref 70–99)
GLUCOSE BLDC GLUCOMTR-MCNC: 262 MG/DL (ref 70–99)
GLUCOSE SERPL-MCNC: 218 MG/DL (ref 70–99)
HBV CORE AB SERPL QL IA: REACTIVE
HBV SURFACE AB SERPL IA-ACNC: 67.96 M[IU]/ML
HCT VFR BLD AUTO: 25 % (ref 40–53)
HCV AB SERPL QL IA: NONREACTIVE
HGB BLD-MCNC: 8.2 G/DL (ref 13.3–17.7)
HIV 1+2 AB+HIV1 P24 AG SERPL QL IA: NONREACTIVE
INTERPRETATION ECG - MUSE: NORMAL
INTERPRETATION ECG - MUSE: NORMAL
MAGNESIUM SERPL-MCNC: 2.6 MG/DL (ref 1.6–2.3)
MCH RBC QN AUTO: 31.8 PG (ref 26.5–33)
MCHC RBC AUTO-ENTMCNC: 32.8 G/DL (ref 31.5–36.5)
MCV RBC AUTO: 97 FL (ref 78–100)
PHOSPHATE SERPL-MCNC: 2.9 MG/DL (ref 2.5–4.5)
PLATELET # BLD AUTO: 81 10E9/L (ref 150–450)
POTASSIUM SERPL-SCNC: 3.8 MMOL/L (ref 3.4–5.3)
PROT SERPL-MCNC: 6 G/DL (ref 6.8–8.8)
RBC # BLD AUTO: 2.58 10E12/L (ref 4.4–5.9)
SODIUM SERPL-SCNC: 134 MMOL/L (ref 133–144)
VANCOMYCIN SERPL-MCNC: 15.1 MG/L
WBC # BLD AUTO: 10 10E9/L (ref 4–11)

## 2018-12-26 PROCEDURE — 25000132 ZZH RX MED GY IP 250 OP 250 PS 637: Mod: GY | Performed by: SURGERY

## 2018-12-26 PROCEDURE — 80048 BASIC METABOLIC PNL TOTAL CA: CPT | Performed by: NURSE PRACTITIONER

## 2018-12-26 PROCEDURE — 25000128 H RX IP 250 OP 636: Performed by: NURSE PRACTITIONER

## 2018-12-26 PROCEDURE — 25000132 ZZH RX MED GY IP 250 OP 250 PS 637: Mod: GY | Performed by: STUDENT IN AN ORGANIZED HEALTH CARE EDUCATION/TRAINING PROGRAM

## 2018-12-26 PROCEDURE — 36415 COLL VENOUS BLD VENIPUNCTURE: CPT | Performed by: NURSE PRACTITIONER

## 2018-12-26 PROCEDURE — A9270 NON-COVERED ITEM OR SERVICE: HCPCS | Mod: GY | Performed by: SURGERY

## 2018-12-26 PROCEDURE — 25000131 ZZH RX MED GY IP 250 OP 636 PS 637: Mod: GY | Performed by: NURSE PRACTITIONER

## 2018-12-26 PROCEDURE — 25000125 ZZHC RX 250

## 2018-12-26 PROCEDURE — 94640 AIRWAY INHALATION TREATMENT: CPT | Mod: 76

## 2018-12-26 PROCEDURE — 25000131 ZZH RX MED GY IP 250 OP 636 PS 637: Mod: GY | Performed by: TRANSPLANT SURGERY

## 2018-12-26 PROCEDURE — 25000125 ZZHC RX 250: Performed by: STUDENT IN AN ORGANIZED HEALTH CARE EDUCATION/TRAINING PROGRAM

## 2018-12-26 PROCEDURE — 85027 COMPLETE CBC AUTOMATED: CPT | Performed by: NURSE PRACTITIONER

## 2018-12-26 PROCEDURE — 00000146 ZZHCL STATISTIC GLUCOSE BY METER IP

## 2018-12-26 PROCEDURE — 40000141 ZZH STATISTIC PERIPHERAL IV START W/O US GUIDANCE

## 2018-12-26 PROCEDURE — 40000275 ZZH STATISTIC RCP TIME EA 10 MIN

## 2018-12-26 PROCEDURE — 25000132 ZZH RX MED GY IP 250 OP 250 PS 637: Mod: GY | Performed by: NURSE PRACTITIONER

## 2018-12-26 PROCEDURE — 80076 HEPATIC FUNCTION PANEL: CPT | Performed by: NURSE PRACTITIONER

## 2018-12-26 PROCEDURE — 71046 X-RAY EXAM CHEST 2 VIEWS: CPT

## 2018-12-26 PROCEDURE — 76857 US EXAM PELVIC LIMITED: CPT

## 2018-12-26 PROCEDURE — 25000128 H RX IP 250 OP 636: Performed by: SURGERY

## 2018-12-26 PROCEDURE — 25000125 ZZHC RX 250: Performed by: SURGERY

## 2018-12-26 PROCEDURE — A9270 NON-COVERED ITEM OR SERVICE: HCPCS | Mod: GY | Performed by: NURSE PRACTITIONER

## 2018-12-26 PROCEDURE — 12000006 ZZH R&B IMCU INTERMEDIATE UMMC

## 2018-12-26 PROCEDURE — 94640 AIRWAY INHALATION TREATMENT: CPT

## 2018-12-26 PROCEDURE — 25000128 H RX IP 250 OP 636: Performed by: TRANSPLANT SURGERY

## 2018-12-26 PROCEDURE — 93975 VASCULAR STUDY: CPT | Mod: TC

## 2018-12-26 PROCEDURE — 80202 ASSAY OF VANCOMYCIN: CPT | Performed by: TRANSPLANT SURGERY

## 2018-12-26 PROCEDURE — 84100 ASSAY OF PHOSPHORUS: CPT | Performed by: NURSE PRACTITIONER

## 2018-12-26 PROCEDURE — 36415 COLL VENOUS BLD VENIPUNCTURE: CPT | Performed by: TRANSPLANT SURGERY

## 2018-12-26 PROCEDURE — A9270 NON-COVERED ITEM OR SERVICE: HCPCS | Mod: GY | Performed by: STUDENT IN AN ORGANIZED HEALTH CARE EDUCATION/TRAINING PROGRAM

## 2018-12-26 PROCEDURE — A9270 NON-COVERED ITEM OR SERVICE: HCPCS | Mod: GY | Performed by: PHYSICIAN ASSISTANT

## 2018-12-26 PROCEDURE — 40000274 ZZH STATISTIC RCP CONSULT EA 30 MIN

## 2018-12-26 PROCEDURE — 25000132 ZZH RX MED GY IP 250 OP 250 PS 637: Mod: GY | Performed by: PHYSICIAN ASSISTANT

## 2018-12-26 PROCEDURE — 25000128 H RX IP 250 OP 636: Performed by: STUDENT IN AN ORGANIZED HEALTH CARE EDUCATION/TRAINING PROGRAM

## 2018-12-26 PROCEDURE — 25000131 ZZH RX MED GY IP 250 OP 636 PS 637: Mod: GY | Performed by: SURGERY

## 2018-12-26 PROCEDURE — 83735 ASSAY OF MAGNESIUM: CPT | Performed by: NURSE PRACTITIONER

## 2018-12-26 RX ORDER — IPRATROPIUM BROMIDE AND ALBUTEROL SULFATE 2.5; .5 MG/3ML; MG/3ML
3 SOLUTION RESPIRATORY (INHALATION)
Status: DISCONTINUED | OUTPATIENT
Start: 2018-12-26 | End: 2018-12-27

## 2018-12-26 RX ORDER — METOPROLOL TARTRATE 50 MG
50 TABLET ORAL 2 TIMES DAILY
Status: DISCONTINUED | OUTPATIENT
Start: 2018-12-26 | End: 2018-12-27

## 2018-12-26 RX ORDER — HYDRALAZINE HYDROCHLORIDE 20 MG/ML
20 INJECTION INTRAMUSCULAR; INTRAVENOUS EVERY 4 HOURS PRN
Status: DISCONTINUED | OUTPATIENT
Start: 2018-12-26 | End: 2018-12-28

## 2018-12-26 RX ORDER — OXYCODONE HYDROCHLORIDE 5 MG/1
5-10 TABLET ORAL EVERY 4 HOURS PRN
Status: DISCONTINUED | OUTPATIENT
Start: 2018-12-26 | End: 2019-01-02

## 2018-12-26 RX ORDER — ACETYLCYSTEINE 200 MG/ML
2 SOLUTION ORAL; RESPIRATORY (INHALATION) EVERY 4 HOURS PRN
Status: DISCONTINUED | OUTPATIENT
Start: 2018-12-26 | End: 2018-12-28

## 2018-12-26 RX ORDER — MAGNESIUM HYDROXIDE 1200 MG/15ML
LIQUID ORAL
Status: COMPLETED
Start: 2018-12-26 | End: 2018-12-26

## 2018-12-26 RX ORDER — BISACODYL 10 MG
10 SUPPOSITORY, RECTAL RECTAL ONCE
Status: COMPLETED | OUTPATIENT
Start: 2018-12-26 | End: 2018-12-26

## 2018-12-26 RX ORDER — HYDRALAZINE HYDROCHLORIDE 20 MG/ML
10 INJECTION INTRAMUSCULAR; INTRAVENOUS ONCE
Status: COMPLETED | OUTPATIENT
Start: 2018-12-26 | End: 2018-12-26

## 2018-12-26 RX ORDER — ALFUZOSIN HYDROCHLORIDE 10 MG/1
10 TABLET, EXTENDED RELEASE ORAL
Status: DISCONTINUED | OUTPATIENT
Start: 2018-12-27 | End: 2019-01-07 | Stop reason: HOSPADM

## 2018-12-26 RX ORDER — AMLODIPINE BESYLATE 10 MG/1
10 TABLET ORAL DAILY
Status: DISCONTINUED | OUTPATIENT
Start: 2018-12-26 | End: 2019-01-07 | Stop reason: HOSPADM

## 2018-12-26 RX ORDER — FUROSEMIDE 10 MG/ML
40 INJECTION INTRAMUSCULAR; INTRAVENOUS ONCE
Status: COMPLETED | OUTPATIENT
Start: 2018-12-26 | End: 2018-12-26

## 2018-12-26 RX ADMIN — FLUCONAZOLE 200 MG: 2 INJECTION, SOLUTION INTRAVENOUS at 22:36

## 2018-12-26 RX ADMIN — ATROPA BELLADONNA AND OPIUM 1 SUPPOSITORY: 16.2; 3 SUPPOSITORY RECTAL at 05:55

## 2018-12-26 RX ADMIN — VALGANCICLOVIR 450 MG: 450 TABLET, FILM COATED ORAL at 09:25

## 2018-12-26 RX ADMIN — PREDNISONE 25 MG: 5 TABLET ORAL at 09:21

## 2018-12-26 RX ADMIN — IPRATROPIUM BROMIDE AND ALBUTEROL SULFATE 3 ML: .5; 3 SOLUTION RESPIRATORY (INHALATION) at 08:12

## 2018-12-26 RX ADMIN — HYDRALAZINE HYDROCHLORIDE 20 MG: 20 INJECTION INTRAMUSCULAR; INTRAVENOUS at 07:13

## 2018-12-26 RX ADMIN — TACROLIMUS 3 MG: 1 CAPSULE ORAL at 09:24

## 2018-12-26 RX ADMIN — VANCOMYCIN HYDROCHLORIDE 1500 MG: 10 INJECTION, POWDER, LYOPHILIZED, FOR SOLUTION INTRAVENOUS at 00:48

## 2018-12-26 RX ADMIN — TACROLIMUS 3 MG: 1 CAPSULE ORAL at 17:31

## 2018-12-26 RX ADMIN — BISACODYL 10 MG: 10 SUPPOSITORY RECTAL at 13:56

## 2018-12-26 RX ADMIN — ACETYLCYSTEINE 2 ML: 200 SOLUTION ORAL; RESPIRATORY (INHALATION) at 16:12

## 2018-12-26 RX ADMIN — IPRATROPIUM BROMIDE AND ALBUTEROL SULFATE 3 ML: .5; 3 SOLUTION RESPIRATORY (INHALATION) at 13:20

## 2018-12-26 RX ADMIN — MYCOPHENOLATE MOFETIL 1000 MG: 250 CAPSULE ORAL at 17:31

## 2018-12-26 RX ADMIN — ACETYLCYSTEINE 2 ML: 200 SOLUTION ORAL; RESPIRATORY (INHALATION) at 13:21

## 2018-12-26 RX ADMIN — OXYCODONE HYDROCHLORIDE 10 MG: 5 TABLET ORAL at 10:01

## 2018-12-26 RX ADMIN — IPRATROPIUM BROMIDE AND ALBUTEROL SULFATE 3 ML: .5; 3 SOLUTION RESPIRATORY (INHALATION) at 16:13

## 2018-12-26 RX ADMIN — FUROSEMIDE 40 MG: 10 INJECTION, SOLUTION INTRAVENOUS at 16:01

## 2018-12-26 RX ADMIN — OMEPRAZOLE 20 MG: 20 CAPSULE, DELAYED RELEASE ORAL at 09:23

## 2018-12-26 RX ADMIN — METOPROLOL TARTRATE 50 MG: 50 TABLET, FILM COATED ORAL at 09:50

## 2018-12-26 RX ADMIN — MYCOPHENOLATE MOFETIL 1000 MG: 250 CAPSULE ORAL at 09:23

## 2018-12-26 RX ADMIN — HYDRALAZINE HYDROCHLORIDE 20 MG: 20 INJECTION INTRAMUSCULAR; INTRAVENOUS at 13:37

## 2018-12-26 RX ADMIN — AMIODARONE HYDROCHLORIDE 0.5 MG/MIN: 50 INJECTION, SOLUTION INTRAVENOUS at 03:17

## 2018-12-26 RX ADMIN — AMIODARONE HYDROCHLORIDE 0.5 MG/MIN: 50 INJECTION, SOLUTION INTRAVENOUS at 11:23

## 2018-12-26 RX ADMIN — ASPIRIN 325 MG: 325 TABLET, DELAYED RELEASE ORAL at 09:21

## 2018-12-26 RX ADMIN — SENNOSIDES AND DOCUSATE SODIUM 2 TABLET: 8.6; 5 TABLET ORAL at 09:24

## 2018-12-26 RX ADMIN — OXYCODONE HYDROCHLORIDE 10 MG: 5 TABLET ORAL at 20:09

## 2018-12-26 RX ADMIN — FUROSEMIDE 40 MG: 10 INJECTION, SOLUTION INTRAVENOUS at 09:14

## 2018-12-26 RX ADMIN — HYDRALAZINE HYDROCHLORIDE 10 MG: 20 INJECTION INTRAMUSCULAR; INTRAVENOUS at 04:41

## 2018-12-26 RX ADMIN — VANCOMYCIN HYDROCHLORIDE 1500 MG: 10 INJECTION, POWDER, LYOPHILIZED, FOR SOLUTION INTRAVENOUS at 12:14

## 2018-12-26 RX ADMIN — PHYTONADIONE 10 MG: 10 INJECTION, EMULSION INTRAMUSCULAR; INTRAVENOUS; SUBCUTANEOUS at 08:58

## 2018-12-26 RX ADMIN — SENNOSIDES AND DOCUSATE SODIUM 1 TABLET: 8.6; 5 TABLET ORAL at 20:08

## 2018-12-26 RX ADMIN — HYDROCODONE BITARTRATE AND ACETAMINOPHEN: 500; 5 TABLET ORAL at 09:24

## 2018-12-26 RX ADMIN — METOPROLOL TARTRATE 50 MG: 50 TABLET, FILM COATED ORAL at 20:08

## 2018-12-26 RX ADMIN — AMIODARONE HYDROCHLORIDE 0.5 MG/MIN: 50 INJECTION, SOLUTION INTRAVENOUS at 19:59

## 2018-12-26 RX ADMIN — METOPROLOL TARTRATE 10 MG: 5 INJECTION, SOLUTION INTRAVENOUS at 00:48

## 2018-12-26 RX ADMIN — MAGNESIUM HYDROXIDE 30 ML: 400 SUSPENSION ORAL at 12:14

## 2018-12-26 RX ADMIN — SULFAMETHOXAZOLE AND TRIMETHOPRIM 1 TABLET: 400; 80 TABLET ORAL at 09:22

## 2018-12-26 RX ADMIN — AMLODIPINE BESYLATE 10 MG: 10 TABLET ORAL at 09:51

## 2018-12-26 RX ADMIN — INSULIN ASPART 8 UNITS: 100 INJECTION, SOLUTION INTRAVENOUS; SUBCUTANEOUS at 23:59

## 2018-12-26 RX ADMIN — ACETYLCYSTEINE 2 ML: 200 SOLUTION ORAL; RESPIRATORY (INHALATION) at 08:13

## 2018-12-26 ASSESSMENT — ACTIVITIES OF DAILY LIVING (ADL)
ADLS_ACUITY_SCORE: 13
ADLS_ACUITY_SCORE: 13
ADLS_ACUITY_SCORE: 12
ADLS_ACUITY_SCORE: 13
ADLS_ACUITY_SCORE: 12
ADLS_ACUITY_SCORE: 12

## 2018-12-26 ASSESSMENT — MIFFLIN-ST. JEOR: SCORE: 1659.63

## 2018-12-26 NOTE — PLAN OF CARE
OT: Per MD parameters in activity orders: SBP , pt's SBPs up to 170s-190s OT will cx and reschedule for 12/27 pending SBPs

## 2018-12-26 NOTE — PROVIDER NOTIFICATION
Time of notification: 2:00 PM  Provider notified:Transplant NP/PA  Patient status: Patient had 14 beats of VT. Blood pressure stable at this time, patient is asymptomatic. FYI page sent to transplant team.   Temp:  [98.2  F (36.8  C)-100.6  F (38.1  C)] 99  F (37.2  C)  Pulse:  [67-83] 72  Heart Rate:  [66-84] 78  Resp:  [16-25] 16  BP: (131-197)/() 141/78  SpO2:  [92 %-100 %] 98 %  Orders received: No new orders received.

## 2018-12-26 NOTE — PROGRESS NOTES
Cross-cover note: 4:13 AM 12/26/2018 12/26/2018    General Surgery Cross Cover Note    Was called by nursing regarding hypertension SBP 200s, decreased UOP and bloody output since RN attempted readjustment of Cronin with significant penile swelling.    Patient endorses significant penile swelling and pain. Endorses feeling of urge to void but has had significant decrease in UOP and now bloody since Cronin adjusted. Endorses abdominal pain somewhat controlled. Endorses dyspnea when laying flat but much improved in chair, requesting to sleep in chair. Does feel that nebs ar helping his breathing when he receives them. Denies headache, lightheadedness, or chest pain.    B/P: 197/93, T: 98.8, P: 78, R: 25    PE:  A&O x3, seated in chair at bedside  Breathing non-labored when seated in chair at bedside on 1LPM, some expiratory wheezing  Abd soft, RLQ tenderness  : significant penile swelling, no erythema or discharge, Cronin is leaking bloody urine around it       Plan:  Low UOP 125ml over the last 6 hours.  - No IVF or Lasix at this time  - Hydralazine 1x dose for SBP 200s  - Bladder scan with 125ml   - Urology consulted    Discussed with Fellow, Dr. Jammie Del Rosario,DO  PGY-1 Surgery Crosscover  [phone]      Addendum: 5am  Attempted to insert Cronin further as directed by Urology. No increased flow. Patient still complaining of pressure and sensation to void.  - B&O suppository ordered  - Further discussion with Urology to repeat bladder scan  - Urology will see this a.m.

## 2018-12-26 NOTE — PLAN OF CARE
Neuro: A&Ox4. Primarily Malian speaking.  at bedside this morning for doctor's rounds.   Cardiac: SR. Blood pressures elevated throughout shift. 1 time dose of IV lopressor given this morning along with oral blood pressure medications, with mild effect. Amio gtt continues at 0.5 mg/min. PRN hydralazine given at 1337 with good effect. Patient had 14 beats VTach at 1345, MD paged. No new orders received.    Respiratory: Sating 99% on 1L via nasal cannula. Expiratory wheezes in upper lobes auscultated. Patient has congested, productive cough. Encouraged patient to cough, deep breath, use IS and acapella.   GI/: Cronin draining pink-tinged urine to gravity. Cronin care done. Penile and scrotal edema present. Adequate urine output at this time. No BM this shift. Milk of magnesia, PO senna-docusate, and dulcolax suppository given. Fluids encouraged.   Diet/appetite: Tolerating clear liquid diet. Eating well. Blood sugars ACHS, elevated this shift in 200's.   Activity:  Assist of 1, up to chair.  Pain: At acceptable level on current regimen. Received 10 mg oxy x1  Skin: No new deficits noted. Walton present to anterior abdomen, scant amount of serous drainage. Right groin site dressing clean, dry, and intact.   LDA's: R internal jugular line leaking this morning, had been pulled out. DALLIN Kim removed internal jugular and applied pressure dressing. Dressing intact. PIV in right and left forearms. Patient has two KARINE drains located on right side of abdomen draining serosanguinous fluid.     Plan: Continue with POC. Notify primary team with changes.

## 2018-12-26 NOTE — PLAN OF CARE
Shift: [id number]  Neuro: A&OX4. Kinyarwanda speaking, speaks little English, used blue  phone without issue. C/o mild tingling in both feet (states this is since surgery and that MDs aware).  Cardiac: SR with rates 60's-80, converted from AFib around 1200 this afternoon, on Amio gtt @30mL/hr. BP's high, 160's-180's systolic, notified MD and obtained order for one time IV Metoprolol 10mg prn, BPs without significant change, MD aware (sent fyi text page-see note) .   Respiratory: Sating >90% on RA. Had been on 1L NC for majority of shift sating 97%. Lung sounds coarse, expiratory wheezes. Mucomyst & DuoNebs scheduled Q4H, PRNs Q2.  GI/: Dark christian/tea colored urine, borderline low UOP from cota (MD aware); scrotum & shaft with moderate swelling, worsened some by end of shift, MD aware. BM X1 on day shift, no BM this stanley, no gas passing. Small/scant emesis, zofran given x1 with relief.    Diet/appetite: Clear liquids diet, tolerating fair, small emesis this stanley.   Activity:  Assist of 1, up to chair.  Pain: At acceptable level on current regimen. Oxycodone given x1.   Skin/LDA's: Abdominal incisions intact with staples, many small scratches on abdomen along incisions. Groin dressing CDI. R triple lumen internal jugular infusing. R and L PIV SL, R JPx2 to bulb suction with small serosanguinous output. Amiodarone gtt infusing @30mL/hr.    Plan: Continue with POC. Notify primary team with changes.

## 2018-12-26 NOTE — PHARMACY-VANCOMYCIN DOSING SERVICE
Pharmacy Vancomycin Note  Date of Service 2018  Patient's  1955   63 year old, male    Indication: Postoperative Infection  Goal Trough Level: 10-15 mg/L  Day of Therapy: 5  Current Vancomycin regimen:  1500 mg IV q12h    Creatinine for last 3 days  2018:  5:24 AM Creatinine 0.84 mg/dL  2018: 12:00 AM Creatinine 0.72 mg/dL;  6:32 AM Creatinine 0.61 mg/dL  2018:  5:23 AM Creatinine 0.75 mg/dL  Current estimated CrCl = Estimated Creatinine Clearance: 106 mL/min (based on SCr of 0.75 mg/dL).    Recent Vancomycin Levels (past 3 days)  2018: 11:28 AM Vancomycin Level 14.4 mg/L  2018: 10:46 AM Vancomycin Level 15.1 mg/L (10hr trough)    Vancomycin IV Administrations (past 72 hours)                   vancomycin (VANCOCIN) 1,500 mg in sodium chloride 0.9 % 250 mL intermittent infusion (mg) 1,500 mg New Bag 18 1214     1,500 mg New Bag  0048     1,500 mg New Bag 18 1243     1,500 mg New Bag  0052     1,500 mg New Bag 18 1149     1,500 mg New Bag 18 2357                Nephrotoxins and other renal medications (From now, onward)    Start     Dose/Rate Route Frequency Ordered Stop    18 1800  tacrolimus (GENERIC EQUIVALENT) capsule 3 mg      3 mg Oral 2 TIMES DAILY. 18 1235      18 0000  vancomycin (VANCOCIN) 1,500 mg in sodium chloride 0.9 % 250 mL intermittent infusion      1,500 mg  over 90 Minutes Intravenous EVERY 12 HOURS 18 2134               Contrast Orders - past 72 hours (72h ago, onward)    None          Interpretation of levels and current regimen:  Trough level is therapeutic, drawn 2 hours early so actual trough likely 12-15 mg/L.    Has serum creatinine changed > 50% in last 72 hours: No    Urine output:  good urine output    Renal Function: Stable    Plan:  1. Patient is clinically improving (WBC trend down, afebrile x 24hr,  MRSA of nares NEG) therefore recommend continue current dose and consider discontinue  if afebrile x 48hr and cultures remain negative.  2.  Pharmacy will check trough levels as appropriate in 3-5 Days.    3. Serum creatinine levels will be ordered daily for the first week of therapy and at least twice weekly for subsequent weeks.      Kim Vizcaino, Pharm.D., [facility]  Pager [phone]

## 2018-12-26 NOTE — PLAN OF CARE
"/90   Pulse 78   Temp 98.8  F (37.1  C) (Oral)   Resp 25   Ht 1.702 m (5' 7\")   Wt 91.6 kg (201 lb 15.1 oz)   SpO2 100%   BMI 31.63 kg/m     Neuro: A&Ox4. Follows commands PERRLA   Cardiac: SR. HR 60s-70s. SBP 180s-190 scheduled metoprolol and hydralazine given x2.   Respiratory: Sating >95% 2-3L NC. Expiratory wheezes noted in lung fields.  GI/:  Penile and scrotal edema. Low urine output via cota. 125mL of urine output from 6097-2912 this morning. Urine is dark pink. Bladder scanned x2. Cross cover MD assessed pt at bedside and attempted to readjust catheter. Urology consult placed. BM X0. Active BS. Passing gas.  Diet/appetite: Tolerating clear liquid diet.   Activity:  Assist of 1. Pt got up to bedside chair. Steady on feet but slow moving.  Pain: At acceptable level on current regimen.   Skin:  Abdominal incision intact. No new deficits noted.  LDA's: Rt internal jugular. Amiodorone drip at 30 mL/hr. LR @ 50mL/hr    Plan: Continue with POC. Notify primary team with changes.   "

## 2018-12-26 NOTE — PROGRESS NOTES
CLINICAL NUTRITION SERVICES - BRIEF NOTE    Nutrition Prescription    Recommendations already ordered by Registered Dietitian (RD):  1. Boost shakes between meals  2. Calorie counts 12/27-12/29    Future/Additional Recommendations:  1. Adjust supplement order per pt preference   2. If pt unable to consistently consume >1400 kcal and 70 gm protein daily (~2/3 estimated nutritional needs), recommend placing FT and starting EN to help meet nutritional needs     New Findings  Pt is POD 4 s/p liver txp. Diet adv from CLD to regular today.    ASSESSED NUTRITION NEEDS  Estimated Energy Needs: 2130 - 2485 (30-35 kcal/kg)  Justification:liver tx  Estimated Protein Needs: 107-142 g protein (1.5-2 g/kg)   Justification: liver tx  Estimated Fluid Needs: 1 mL/kcal  Justification: Maintenance      Interventions  1. Medical food supplement - as ordered above  2. Calorie counts - 3 days    Nutrition Progress Note - f/u for progress towards previous nutrition POC (see previous 12/22 reassessment for details)

## 2018-12-26 NOTE — PROGRESS NOTES
Final positive donor sputum culture results have been uploaded to [facility].  Donor ID [id number].  Dr. De La Cruz notified of results.

## 2018-12-26 NOTE — PROGRESS NOTES
6:39 AM  December 26, 2018  Preliminary positive donor sputum culture results for staph aureus have been uploaded to [facility].  Donor ID [id number].  Dr. Agudelo notified of results.  Sofi Rivero RN, [facility]  On Call Organ Coordinator

## 2018-12-26 NOTE — PROGRESS NOTES
Time of notification: 10:53 PM  Provider notified: Dr. Agudelo  Sent text page as 'fyi' regarding: Urine dark christian/tea colored with 400mL total UOP 12:00-22:30. BPs remain 170's-180's systolic.   Orders received: No new orders at this time.

## 2018-12-26 NOTE — CONSULTS
Urology Consult    Name: Loy Limon    MRN: [id number]   YOB: 1955               Chief Complaint:   Low urine output     History is obtained from the patient          History of Present Illness:   Loy Limon is a 63 year old male with PMH of liver cirrhosis and  HCC now POD4 after liver transplant and urologic hx of LUTS on medical therapy who we were consulted on for low urine output overnight (125 over 5 hours). Per chart review patient has cota catheter for in/out, no history of retention (PVR in clinic was 15 ml). Nurses tired to reposition the catheter and take down the balloon and re-inflate, and then they encountered gross hematuria. Bladder scan was 100-125 ml.           Past Medical History:     Past Medical History:   Diagnosis Date     Cancer (H)     hepatocellualr carcinoma     Cirrhosis of liver (H) 2018     Enlarged prostate      Inguinal hernia      Liver lesion 2018            Past Surgical History:     Past Surgical History:   Procedure Laterality Date     APPENDECTOMY       COLONOSCOPY                  Social History:     Social History     Tobacco Use     Smoking status: Former Smoker     Packs/day: 0.03     Years: 20.00     Pack years: 0.60     Types: Cigarettes, Cigars     Start date: 1970     Last attempt to quit: 3/14/2018     Years since quittin.7     Smokeless tobacco: Never Used     Tobacco comment: Quit smoking 2018, one cigarette after dinner   Substance Use Topics     Alcohol use: No     Comment: Quit drinking 2018            Family History:     Family History   Problem Relation Age of Onset     Liver Disease Brother             Allergies:   No Known Allergies         Medications:     Current Facility-Administered Medications   Medication     acetylcysteine (MUCOMYST) 20 % nebulizer solution 2 mL     albuterol (PROAIR HFA/PROVENTIL HFA/VENTOLIN HFA) 108 (90 Base) MCG/ACT inhaler 6 puff     albuterol (PROVENTIL) neb solution 2.5 mg      [START ON 12/27/2018] alfuzosin ER (UROXATRAL) 24 hr tablet 10 mg     amiodarone (NEXTERONE) 250 mg in D5W 250 mL infusion     amLODIPine (NORVASC) tablet 10 mg     aspirin (ASA) EC tablet 325 mg     bisacodyl (DULCOLAX) Suppository 10 mg     dextromethorphan-guaiFENesin (MUCINEX DM)  MG per 12 hr tablet 1 tablet     glucose gel 15-30 g    Or     dextrose 50 % injection 25-50 mL    Or     glucagon injection 1 mg     fluconazole (DIFLUCAN) intermittent infusion 200 mg in NaCl     hydrALAZINE (APRESOLINE) injection 20 mg     insulin aspart (NovoLOG) inj (RAPID ACTING)     insulin aspart (NovoLOG) inj (RAPID ACTING)     insulin aspart (NovoLOG) inj (RAPID ACTING)     insulin aspart (NovoLOG) inj (RAPID ACTING)     ipratropium - albuterol 0.5 mg/2.5 mg/3 mL (DUONEB) neb solution 3 mL     lactated ringers infusion     magnesium hydroxide (MILK OF MAGNESIA) suspension 30 mL     metoprolol tartrate (LOPRESSOR) tablet 50 mg     mycophenolate (GENERIC EQUIVALENT) capsule 1,000 mg     naloxone (NARCAN) injection 0.1-0.4 mg     omeprazole (priLOSEC) CR capsule 20 mg     ondansetron (ZOFRAN) injection 4 mg     opium-belladonna (B&O SUPPRETTES) 30-16.2 MG per suppository 1 suppository     oxyCODONE (ROXICODONE) tablet 5-10 mg     polyethylene glycol (MIRALAX/GLYCOLAX) Packet 17 g     [START ON 12/27/2018] predniSONE (DELTASONE) tablet 10 mg     senna-docusate (SENOKOT-S/PERICOLACE) 8.6-50 MG per tablet 2 tablet     sodium chloride (PF) 0.9% PF flush 3 mL     sodium chloride (PF) 0.9% PF flush 3 mL     sodium chloride (PF) 0.9% PF flush 3 mL     sulfamethoxazole-trimethoprim (BACTRIM/SEPTRA) 400-80 MG per tablet 1 tablet     tacrolimus (GENERIC EQUIVALENT) capsule 3 mg     valGANciclovir (VALCYTE) tablet 450 mg     vancomycin (VANCOCIN) 1,500 mg in sodium chloride 0.9 % 250 mL intermittent infusion             Review of Systems:    ROS: 10 point ROS neg other than the symptoms noted above in the HPI           Physical  Exam:   VS:  T: 99    HR: 72    BP: 141/78    RR: 16   GEN:  AOx3.  NAD.    CV:  RRR  LUNGS: Non-labored breathing.   BACK:  No midline or CVA tenderness.  ABD:  Soft.  NT.  ND.  No rebound or guarding.  No masses.  :  uncircumcised.  Normal penile shaft with circumferential and uniform swelling, no tight bands or skin changes .  EXT:  Warm, well perfused.   SKIN:  Warm.  Dry.  No rashes.  NEURO:  CN grossly intact.            Data:   All laboratory data reviewed:    Recent Labs   Lab 12/26/18  0910 12/24/18  0524 12/23/18  1204 12/23/18  0404   WBC 10.0 9.3 11.3* 7.2   HGB 8.2* 8.0* 8.7* 8.7*   PLT 81* 62* 53* 50*     Recent Labs   Lab 12/26/18  0523 12/25/18  0632 12/25/18  0000 12/24/18  0524 12/23/18  0404    136 137 140 144   POTASSIUM 3.8 4.1 3.8 3.9 3.6   CHLORIDE 99 101 103 108 112*   CO2 27 28 27 26 24   BUN 25 19 22 28 18   CR 0.75 0.61* 0.72 0.84 0.66   * 221* 181* 125* 107*   YELENA 7.4* 7.7* 7.2* 7.0* 6.9*   MAG 2.6* 2.3 1.8 1.9 1.7   PHOS 2.9 3.0  --  4.4 4.4     Recent Labs   Lab 12/25/18  0732   COLOR Yellow   APPEARANCE Slightly Cloudy   URINEGLC Negative   URINEBILI Small*   URINEKETONE Negative   SG 1.021   URINEPH 6.0   PROTEIN 30*   NITRITE Negative   LEUKEST Negative   RBCU 7*   WBCU 1       All pertinent imaging reviewed:    Results for orders placed or performed during the hospital encounter of 12/21/18   XR Chest 2 Views    Narrative    Exam: XR CHEST 2 VW, 12/22/2018 1:16 AM    Indication: liver transplant admit    Comparison: CT chest 10/3/2018, chest x-ray 6/18/2018    Findings:   PA and lateral views of the chest. The trachea is midline. The  cardiomediastinal silhouette is normal in size. The pulmonary  vasculature is within normal limits. No pneumothorax. No pleural  effusion. Calcified granuloma in the left lower lung. Lungs are  otherwise clear. No acute bony abnormalities. The visualized upper  abdomen appears unremarkable.       Impression    Impression:No acute  findings.    I have personally reviewed the examination and initial interpretation  and I agree with the findings.    AUGUSTO CHEATHAM MD   XR Chest Port 1 View    Narrative    Exam: XR CHEST PORT 1 VW 12/22/2018 9:42 PM    History: Line Assessment    Comparison: Earlier same date    Findings: Single AP view of the chest. New endotracheal tube tip  projects approximately 2.3 cm and the kelli. New right IJ Arapaho-Olga  catheter tip projects over the pulmonary outflow tract. New gastric  tube extends below the diaphragm, tip not included in the  field-of-view. Cardia mediastinal silhouette is borderline enlarged,  exaggerated by low lung volumes and AP technique. Prominence of the  myra, new since prior and also likely exaggerated. No pleural effusion  and no pneumothorax. Epigastric surgical clips. No acute bony  abnormality.      Impression    Impression:   1. New endotracheal tube tip projects 2.3 cm above the kelli.  2. New right IJ Arapaho-Olga catheter tip projects over the pulmonary  outflow tract.  3. Cardiomediastinal silhouette and the myra are exaggerated by low  lung volumes and AP technique.  4. No new or acute pulmonary opacities.    SLOAN MONTANO MD   XR Abdomen Port 1 View    Narrative    Exam: XR ABDOMEN PORT 1 VW 12/22/2018 9:42 PM    History: OG placement    Comparison: None available    Findings: Single portable supine view of the abdomen. Gastric tube tip  and sidehole project over the stomach. Postoperative changes of liver  transplantation. Upper abdominal chevron-shaped skin staples.  Cholecystectomy clip. Surgical drain projects over the right upper  quadrant. There are a few prominent loops of gas-filled small bowel  which are not frankly dilated. No portal venous gas or pneumatosis.  Epigastric surgical clips. Degenerative changes in the spine. For  findings in the lung bases, please see same-day dedicated chest x-ray.      Impression    Impression:   1. Gastric tube tip and sidehole project  over the stomach.  2. Postoperative changes of liver transplantation.  3. Nonobstructive bowel gas pattern with prominent loops of gas-filled  small bowel which are not frankly dilated.  4. For findings in the lung bases, please see same-day dedicated chest  x-ray.    SLOAN MONTANO MD   US Liver Transplant    Narrative    EXAMINATION: US LIVER TRANSPLANT, 12/22/2018 11:12 PM    COMPARISON: Abdominal ultrasound 10/31/2018    HISTORY: Liver transplant on 12/22/2018    TECHNIQUE: The abdomen was scanned in standard fashion with  specialized ultrasound transducer(s) using both grey scale and limited  color Doppler techniques.    Findings:    Liver: The transplant liver parenchyma is of normal echogenicity.. No  peritransplant fluid collection is identified.    Gallbladder: The gallbladder is surgically absent. No intra or  extrahepatic biliary dilatation. The common bile duct measures 4 mm.    Pancreas: The pancreas is not visualized.    Kidneys: Both kidneys are of normal echotexture, without mass nor  hydronephrosis. The craniocaudal dimensions are: right- 10.9 cm, left-  10.4 cm.    Spleen: The spleen is unremarkable and measures 12.4 cm in sagittal  dimension.    Fluid: No free fluid.    DOPPLER:  Doppler evaluation shows flow towards the liver in the  portal venous system.     Flow is   60 cm/sec in the extrahepatic native portal vein  42 cm/sec at the portal vein anastomosis  36 cm/sec in the intrahepatic transplant portal vein.     Flow is:  29 cm/sec in the leftward of the branch portal vein  33 cm/sec in the rightward branch of the portal vein  both flow towards the liver.     Flow in the hepatic artery is towards the liver with low resistance  waveforms:  66 cm/sec peak systolic  25 cm/sec minimum diastolic  0.62 resistive index.     Left hepatic artery: 88 cm/s, the resistive indices of 0.56  Right hepatic artery: 46 cm/s, with a resistive indices of 0.60    Mildly delayed systolic upstroke in the main,  right and left hepatic  arteries.    Inferior vena cava above the anastomosis: 93 cm/s  Inferior vena cava at anastomosis: 89 cm/s  Inferior vena cava intrahepatic: 44 cm/s  Inferior vena cava extrahepatic: 38 cm/s    The splenic vein is not visualized, due to patient's LPO position at  the time of the exam. The left, middle, and right hepatic veins are  patent with flow towards the IVC. The IVC is patent with flow towards  the heart.  Aorta is not dilated.      Impression    Impression:     1. Patent Doppler evaluation of the transplant liver.   2. The main, right and left hepatic arteries are patent, with mildly  delayed systolic upstroke suggesting upstream stenosis. No upstream  stenosis is directly visualized, however the anastomosis is not  demonstrated. Cannot exclude upstream/anastomotic hepatic arterial  stenosis and/or vasospasm.    I have personally reviewed the examination and initial interpretation  and I agree with the findings.    AUGUSTO CHEATHAM MD   XR Chest Port 1 View    Narrative    XR CHEST PORT 1 VW 12/23/2018 3:42 PM    History: eval CVC after re-wiring    Comparison: 12/22/2018    Findings: Single AP view of the chest. Interval removal of the  endotracheal tube, right IJ Tyndall-Olga and IJ sheath, and gastric tube.  Right IJ central venous catheter tip projects over the high SVC.  Stable cardiomegaly. Stable bibasilar opacities. Mild pulmonary  vascular congestion is unchanged.      Impression    Impression:   1. Interval extubation and removal of right IJ Tyndall-Olga catheter and  gastric tube.   2. Right-sided IJ central venous catheter tip projects over the high  SVC.  3. Minimal bibasilar opacities, likely atelectasis.    I have personally reviewed the examination and initial interpretation  and I agree with the findings.    SLOAN MONTANO MD   XR Chest Port 1 View    Narrative    Exam: XR CHEST PORT 1 VW, 12/24/2018 8:24 PM    Indication: dyspnea    Comparison: Chest x-ray  12/23/2018    Findings:   AP radiograph of the chest at 50 degrees. Right IJ central venous  catheter with tip projecting over the low SVC. The trachea is midline.  The cardiac mediastinal silhouette is normal in size. The pulmonary  vasculature is indistinct. No pneumothorax. No pleural effusion.  Streaky bibasilar opacities. Continuous surgical staples project over  the left upper abdomen.      Impression    Impression:   Increased streaky bibasilar opacities, which may represent  atelectasis. Differential also includes infection.     I have personally reviewed the examination and initial interpretation  and I agree with the findings.    SUKHDEEP RIDER MD   XR Abdomen Port 1 View    Narrative    Exam: XR ABDOMEN PORT 1 VW, 12/24/2018 8:28 PM    Indication: increased abdominal distention    Comparison: Abdominal radiograph 12/22/2018    Findings:   Supine radiographs of the abdomen were obtained. Surgical changes of  liver transplantation, with upper abdominal chevron-shaped skin  staples. Cholecystectomy clips. Surgical drain projecting over the  right upper quadrant. There are multiple loops of air-distended small  bowel, measuring up to 4.4 cm in diameter in the left lower quadrant,  which has progressed since the prior radiograph on 12/22/2018. No  pneumatosis. No portal venous gas. Streaky opacities in the left lung  base.      Impression    Impression:   1. Postsurgical changes of liver transplantation.  2. Multiple loops of air distended small bowel, increased since the  prior radiograph, which could represent obstruction or adynamic ileus.    3. Streaky opacities in the visualized left lung base.    I have personally reviewed the examination and initial interpretation  and I agree with the findings.    SUKHDEEP RIDER MD   US Bladder Only Portable    Narrative    Exam: Ultrasound of the bladder 12/26/2018    COMPARISON: Ultrasound the abdomen 12/22/2018    HISTORY: Low urine output with hematuria, verify that  the catheter  balloon is in the bladder.    FINDINGS/    Impression    IMPRESSION: Cronin catheter with balloon expanded in the decompressed  urinary bladder.    I have personally reviewed the examination and initial interpretation  and I agree with the findings.    SLOAN MONTANO MD   XR Chest 2 Views    Narrative    Examination: XR CHEST 2 VW, 12/26/2018 11:39 AM    Comparison: 12/24/2018    History: POD #4 liver transplant. Dyspnea and wheezing. Eval for edema  vs infection.    Findings: Right IJ central venous catheter has been removed. Stable  borderline enlargement of the cardiac silhouette. Unchanged bilateral  perihilar predominant opacities. Small right-sided pleural effusion  appears unchanged on the PA radiograph. No pneumothorax. Surgical  changes in the upper abdomen. Moderate distention of the stomach.      Impression    Impression:   1. Unchanged bilateral perihilar predominant opacities compatible with  mild interstitial pulmonary edema. Cannot exclude infectious process.  2. Small right-sided pleural effusion does not appear substantially  changed.    AUGUSTO CHEATHAM MD    Liver Transplant Follow Up Portable    Narrative    Ultrasound Liver Transplant, 12/26/2018 1:23 PM    Technique: This examination was obtained with gray scale and limited  color Doppler in real-time imaging.    Clinical information: Increased bili POD 4 liver transplant    Comparison: Abdominal ultrasound 12/22/2018.     Findings:   Small right-sided pleural effusion.    Kidney: Right kidney measures 10 cm. No hydronephrosis, shadowing  renal calculi, or renal mass identified.    Transplant liver:       Length: 17.9 cm       Morphology: Contour and echotexture are unremarkable, without  textural abnormalities or masses.       Subphrenic and Subhepatic spaces: There is a small complex  septated fluid collection measuring 0.8 x 1.2 x 1.2 adjacent to the  falciform ligament. Additionally there is a complex and  septated  collection measuring 5.2 x 4.0 x 7.4 cm anterior to the right lobe of  the liver. These collections show no internal color Doppler flow.     Biliary tract:       Extrahepatic biliary tract diameter: 0.5 cm       Intrahepatic biliary tree: No intrahepatic ductal or ductal  dilatation.    Doppler examination of the hepatic vessels:      Hepatic Artery:       Main: Patent native and donor segments and at anastomosis          Native artery: velocity 117 cm/s, RI 0.7       Right HA: PSV: 51 cm/sec, RI 0.69       Left HA: PSV: 47 cm/sec, RI 0.64      Portal Vein:       Patent, antegrade flow.          Pre-anastomotic velocity: 42 cm/s          Anastomosis velocity: 44 cm/s          Post-anastomosis velocity: 30 cm/s       RPV Flow: Patent, antegrad flow.. Velocity: 36 cm/sec       LPV Flow: Patent, antegrad flow.. Velocity: 20 cm/sec      Splenic Vein: Not visualized      Hepatic Veins:       RHV waveform: Patent, phasic flow       MHV waveform: Patent, phasic flow       LHV waveform: Patent, phasic flow      IVC:  Above anastomosis: 116 cm/sec  Superior anastomosis: 72 cm/sec  Intrahepatic: 103 cm/sec  Inferior anastomosis: 104 cm/sec  Extrahepatic/intrahepatic IVC: 49 cm/sec      Hepatic Vein/IVC anastomosis       Patent without evidence of stenosis      Impression    Impression:  1. Postoperative changes of hepatic transplantation.  2. Complex perihepatic collections as above, new or significant  increase in size since the prior.   3. Patent and normal Doppler evaluation.  4. Right-sided pleural effusion.    I have personally reviewed the examination and initial interpretation  and I agree with the findings.    SLOAN MONTANO MD            Impression and Plan:   Impression:   Loy Limon is a 63 year old male with PMH of liver cirrhosis, HCC and ascites now s/p transplant and urologic hx of LUTS. We were consulted about low urine output with catheter in place     - Urine watermelon color, suspect due to  manipulation of catheter with large prostate   - Patient has ascites (seen on recent MRI) so suspect bladder scan will always  pelvic fluid  - Catheter flushed easily with no return of clots and debris. Bladder was empty with no urine. Moreover bladder ultrasound was obtained which showed decompressed bladder with cota balloon in place          Plan:     - low urine output not secondary to obstruction, primary team to work on that    - catheter in good position, please keep secured to leg and minimize manipulation     - bladder scan is highly inaccurate in patient with ascites, wouldn't make any decisions based on value   - will need follow up with Dr Torres for gross hematuria \  - encourage removal of cota catheter as there is no strong indication for keeping it    - Urology will sign off. Please contact resident/PA on call with any questions or concerns.         This patient's exam findings, labs, and imaging discussed with urology staff surgeon Dr. Lopez, who developed the treatment plan.    Stevo Chavez MD  Urology Resident PGY4

## 2018-12-26 NOTE — PROVIDER NOTIFICATION
Pt's systolic blood pressures are 180s-190s. His urine is pink/red in color . Urine output from 2230 to 0330 was 125 ml. Edema +3 of penis and scrotum. Expiratory wheezing. Got pt up to chair and had him use the acapella and incentive spirometer. Notified surgery cross cover MD. Orders to bladder scan pt. MD coming to assess pt ant bedside and reposition catheter. Urology consult placed per MD. hydralazine ordered.

## 2018-12-26 NOTE — PLAN OF CARE
PT / 6B - Cancel. Patient not medically appropriate this AM secondary to hypertension during scheduled  time.  Patient rescheduled per POC.

## 2018-12-26 NOTE — PROGRESS NOTES
Transplant Surgery  Inpatient Daily Progress Note  12/26/2018    Assessment & Plan: 62 yo M hx Laennec's cirrhosis with HCC. S/p DBD OLT with right inguinal hernia repair with biologic mesh 12/22/18. Notable for small arterial anastomosis (donor common hepatic with atherosclerosis to recipient replaced right hepatic).    Graft function: POD #4. Transaminases down, Tbili and AP increased. US today, and MRCP tomorrow if still elevated.  Immunosuppression management: Induction with steroid pulse.  Methylpred:  Taper per protocol  MMF: 1000mg BID  Tacro: Goal level 10-15  Complexity of management: High. Contributing factors: induction  Hematology:   Anemia: Hgb stable  Vascular prophylaxis: Due to small HA anastomosis. Received dextran X48 hrs, on aspirin 325.   Cardiorespiratory:   Atrial fibrillation with RVR: On 12/25. Started on scheduled metoprolol and amio bolus/drip. Trops low/negative. 12/25 Echo: severely dilated left atrium, mild to moderate LVH, preserved EF. Converted back to NSR 12/25 PM. Continue amio gtt for today.  Hypoxia/dyspnea/reactive airways: Mild hypoxia and wheezing. Check CXR and diurese. Continue PRN nebs.  HTN: Increase amlodipine to 10mg, change metoprolol to 50mg oral BID.  GI/Nutrition: Advance to reg diet as tolerated  Constipation: Supp today. Continue senna, miralax, MOM.  Endocrine:   Hyperglycemia: A1c 5.8% on admission. Steroid induced hyperglycemia. Glucoses >200, change to high sliding scale insulin.  Fluid/Electrolytes:   Hypervolemia: Weight up 6kg. TKO IVF and lasix x1.  :   Hematuria: Developed overnight with manipulation of cota. Irrigated by Urology this morning, not obstructed.   Bladder spasms: B&O supp ordered.  Penile edema: Cota in place. Diurese today.   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  Prophylaxis: DVT, fall, GI, continue abx (vanc/diflucan) given herniorraphy with mesh.    Disposition: 6B     Medical Decision Making: Medium    IRVIN/Fellow/Resident Provider: Inez Baker NP [id number]    Faculty: Shaila De La Cruz MD    __________________________________________________________________  Transplant History: Admitted 12/21/2018 for DDLT and RIH repair (XenMatrix mesh).   The patient has a history of liver failure due to Laennec's cirrhosis with HCC.    12/22/2018 (Liver), Postoperative day: 4     Interval History: History is obtained from the patient with assistance of   NSR since ~20:00. Cronin replaced overnight for penile edema, then developed mild hematuria. Pt c/o dyspnea this morning, better after neb. + flatus, no nausea, BM x1.   ROS:   A 10-point review of systems was negative except as noted above.    Curent Meds:    acetylcysteine  2 mL Nebulization Q4H     amLODIPine  10 mg Oral Daily     aspirin  325 mg Oral Daily     fluconazole  200 mg Intravenous Q24H     furosemide  40 mg Intravenous Once     insulin aspart   Subcutaneous TID AC     insulin aspart  1-7 Units Subcutaneous TID AC     insulin aspart  1-5 Units Subcutaneous At Bedtime     magnesium hydroxide  30 mL Oral Daily     metoprolol tartrate  50 mg Oral BID     mycophenolate  1,000 mg Oral BID IS     omeprazole  20 mg Oral QAM AC     phytonadione  10 mg Intravenous Daily     polyethylene glycol  17 g Oral Daily     predniSONE  25 mg Oral Once    Followed by     [START ON 12/27/2018] predniSONE  10 mg Oral or Feeding Tube Once     senna-docusate  2 tablet Oral or Feeding Tube BID     sodium chloride (PF)  3 mL Intravenous Q8H     sodium chloride 0.9% (bottle)         sulfamethoxazole-trimethoprim  1 tablet Oral or Feeding Tube Daily     tacrolimus  3 mg Oral BID IS     valGANciclovir  450 mg Oral Daily    Or     valGANciclovir  450 mg Oral or NG Tube Daily     vancomycin (VANCOCIN) IV  1,500 mg Intravenous Q12H       Physical Exam:     Admit Weight: 84.6 kg (186 lb 9.6 oz)    Current Vitals:   /90   Pulse  "78   Temp 98.8  F (37.1  C) (Oral)   Resp 20   Ht 1.702 m (5' 7\")   Wt 91.6 kg (201 lb 15.1 oz)   SpO2 97%   BMI 31.63 kg/m      Vital sign ranges:    Temp:  [98.2  F (36.8  C)-99.9  F (37.7  C)] 98.8  F (37.1  C)  Pulse:  [67-78] 78  Heart Rate:  [] 78  Resp:  [20-30] 20  BP: (148-197)/() 192/90  SpO2:  [92 %-100 %] 97 %  Patient Vitals for the past 24 hrs:   BP Temp Temp src Pulse Heart Rate Resp SpO2 Weight   12/26/18 0813 -- -- -- -- -- 20 97 % --   12/26/18 0605 192/90 -- -- -- -- -- -- --   12/26/18 0600 192/90 -- -- -- -- -- 100 % --   12/26/18 0412 -- -- -- -- -- -- -- 91.6 kg (201 lb 15.1 oz)   12/26/18 0300 (!) 197/93 98.8  F (37.1  C) Oral -- 78 25 96 % --   12/25/18 2342 182/85 98.7  F (37.1  C) Axillary 78 -- 20 92 % --   12/25/18 2240 180/82 -- -- 71 -- -- -- --   12/25/18 2115 174/87 -- -- 67 -- -- -- --   12/25/18 2100 168/74 -- -- -- -- -- -- --   12/25/18 2012 -- -- -- -- -- -- 99 % --   12/25/18 1929 172/75 98.6  F (37  C) Oral -- 66 20 97 % --   12/25/18 1730 (!) 174/92 -- -- 67 -- -- -- --   12/25/18 1606 -- -- -- -- -- -- 99 % --   12/25/18 1535 (!) 184/120 98.2  F (36.8  C) Oral -- 84 20 98 % --   12/25/18 1348 165/84 98.7  F (37.1  C) Axillary -- 72 22 99 % --   12/25/18 1302 166/72 98.7  F (37.1  C) Axillary -- 92 22 97 % --   12/25/18 1200 (!) 154/94 98.3  F (36.8  C) Axillary -- 105 24 100 % --   12/25/18 1100 (!) 152/97 99.1  F (37.3  C) Axillary -- 108 24 100 % --   12/25/18 1020 152/84 99.6  F (37.6  C) Axillary -- 124 24 100 % --   12/25/18 0954 (!) 160/97 99.1  F (37.3  C) Axillary -- 102 28 100 % --   12/25/18 0939 (!) 148/103 99.6  F (37.6  C) Axillary -- 121 24 -- --   12/25/18 0924 (!) 173/116 99.4  F (37.4  C) Axillary -- 123 26 98 % --   12/25/18 0905 (!) 156/129 99.9  F (37.7  C) Oral -- 122 30 99 % --     General Appearance: in no apparent distress.   Skin: normal, warm, dry, No rashes, induration, or jaundice  Heart: NSR  Lungs: without wheezes, clear to " ausculattion  Abdomen: abd soft, slightly distended. Incision c/d/i, staples in place. KARINE X2- serosang.   : The genitalia are edematous   Extremities: edema: present bilaterally. 2+. There is no skin breakdown.  Neurologic: awake, alert and oriented x4. Tremor absent.. Asterixis: absent.    Frailty Scores     Frailty Scores 5/29/2018    Final Score Not Frail    Final Score Number 0          Data:   CMP  Recent Labs   Lab 12/26/18  0523 12/25/18  0632  12/23/18  0404  12/22/18 2010 12/22/18  0013    136   < > 144   < > 139   < > 139   POTASSIUM 3.8 4.1   < > 3.6   < > 3.5   < > 4.5   CHLORIDE 99 101   < > 112*   < >  --   --  109   CO2 27 28   < > 24   < >  --   --  26   * 221*   < > 107*   < > 172*   < > 167*   BUN 25 19   < > 18   < >  --   --  10   CR 0.75 0.61*   < > 0.66   < >  --   --  0.50*   GFRESTIMATED >90 >90   < > >90   < >  --   --  >90   GFRESTBLACK >90 >90   < > >90   < >  --   --  >90   YELENA 7.4* 7.7*   < > 6.9*   < >  --   --  7.7*   ICAW  --   --   --  4.5  --  4.8   < >  --    MAG 2.6* 2.3   < > 1.7  --   --   --  2.0   PHOS 2.9 3.0   < > 4.4  --   --   --  2.8   AMYLASE  --   --   --  46  --   --   --  67   LIPASE  --   --   --  93  --   --   --   --    ALBUMIN 1.9* 1.8*   < > 1.6*   < >  --   --  2.5*   BILITOTAL 1.7* 1.5*   < > 1.7*   < >  --   --  1.7*   ALKPHOS 482* 346*   < > 166*   < >  --   --  408*   AST 82* 179*   < > 773*   < >  --   --  48*   * 352*   < > 602*   < >  --   --  40    < > = values in this interval not displayed.     CBC  Recent Labs   Lab 12/24/18  0524 12/23/18  1204  12/22/18 2121   HGB 8.0* 8.7*   < > 9.3*   WBC 9.3 11.3*   < > 8.0   PLT 62* 53*   < > 66*   A1C  --   --   --  5.8*    < > = values in this interval not displayed.     COAGS  Recent Labs   Lab 12/23/18  1204 12/23/18  0404 12/22/18 2121 12/22/18  0013   INR 1.36* 1.61* 2.04* 1.77*   PTT  --   --  40* 38*      Urinalysis  Recent Labs   Lab Test 12/25/18  0732 12/22/18  0034   07/19/18  1133   COLOR Yellow Yellow   < > Yellow   APPEARANCE Slightly Cloudy Clear   < > Clear   URINEGLC Negative Negative   < > Negative   URINEBILI Small* Negative   < > Negative   URINEKETONE Negative Negative   < > Negative   SG 1.021 1.012   < > 1.009   UBLD Moderate* Trace*   < > Negative   URINEPH 6.0 6.5   < > 8.0*   PROTEIN 30* Negative   < > Negative   NITRITE Negative Negative   < > Negative   LEUKEST Negative Negative   < > Negative   RBCU 7* 3*   < >  --    WBCU 1 1   < >  --    UTPG  --   --   --  Unable to calculate due to low value    < > = values in this interval not displayed.     Virology:  Hepatitis C Antibody   Date Value Ref Range Status   12/22/2018 Nonreactive NR^Nonreactive Final     Comment:     Assay performance characteristics have not been established for newborns,   infants, and children

## 2018-12-26 NOTE — PROVIDER NOTIFICATION
Time of notification: 8:15 AM  Provider notified: NP provider  Patient status: pt internal jugular leaking from the site , was infusing Amiodarone and LR .   Temp:  [98.2  F (36.8  C)-99.9  F (37.7  C)] 98.8  F (37.1  C)  Pulse:  [67-78] 78  Heart Rate:  [] 78  Resp:  [20-30] 20  BP: (148-197)/() 192/90  SpO2:  [92 %-100 %] 97 %  Orders received: stopped infusion and got new PIV for Amiodarone infusion. Pt has 2 older PIV . Pt also was having difficulty of breathing with expiratory wheezing , RT was called and gave Neb . Pt on 1 LPm sating 96% .  Will continue to monitor.

## 2018-12-27 ENCOUNTER — DOCUMENTATION ONLY (OUTPATIENT)
Dept: TRANSPLANT | Facility: CLINIC | Age: 63
End: 2018-12-27

## 2018-12-27 ENCOUNTER — APPOINTMENT (OUTPATIENT)
Dept: MRI IMAGING | Facility: CLINIC | Age: 63
DRG: 005 | End: 2018-12-27
Attending: NURSE PRACTITIONER
Payer: MEDICARE

## 2018-12-27 ENCOUNTER — APPOINTMENT (OUTPATIENT)
Dept: OCCUPATIONAL THERAPY | Facility: CLINIC | Age: 63
DRG: 005 | End: 2018-12-27
Attending: TRANSPLANT SURGERY
Payer: MEDICARE

## 2018-12-27 PROBLEM — D84.9 IMMUNOSUPPRESSED STATUS (H): Status: ACTIVE | Noted: 2018-12-27

## 2018-12-27 PROBLEM — R09.02 HYPOXIA: Status: ACTIVE | Noted: 2018-12-27

## 2018-12-27 PROBLEM — Z76.82 LIVER TRANSPLANT CANDIDATE: Status: RESOLVED | Noted: 2018-12-21 | Resolved: 2018-12-27

## 2018-12-27 PROBLEM — D84.9 IMMUNOSUPPRESSED STATUS (H): Chronic | Status: ACTIVE | Noted: 2018-12-27

## 2018-12-27 PROBLEM — I48.91 ATRIAL FIBRILLATION WITH RAPID VENTRICULAR RESPONSE (H): Status: ACTIVE | Noted: 2018-12-25

## 2018-12-27 PROBLEM — Z94.4 LIVER TRANSPLANT RECIPIENT (H): Chronic | Status: ACTIVE | Noted: 2018-12-22

## 2018-12-27 PROBLEM — R06.2 BILATERAL WHEEZING: Status: ACTIVE | Noted: 2018-12-27

## 2018-12-27 PROBLEM — R31.9 HEMATURIA: Status: ACTIVE | Noted: 2018-12-27

## 2018-12-27 PROBLEM — E87.70 HYPERVOLEMIA: Status: ACTIVE | Noted: 2018-12-27

## 2018-12-27 LAB
A* LOCUS: NORMAL
A*: NORMAL
ABTEST METHOD: NORMAL
ALBUMIN SERPL-MCNC: 1.7 G/DL (ref 3.4–5)
ALP SERPL-CCNC: 769 U/L (ref 40–150)
ALT SERPL W P-5'-P-CCNC: 228 U/L (ref 0–70)
ANION GAP SERPL CALCULATED.3IONS-SCNC: 5 MMOL/L (ref 3–14)
AST SERPL W P-5'-P-CCNC: 97 U/L (ref 0–45)
B* LOCUS: NORMAL
B*: NORMAL
BACTERIA SPEC CULT: NO GROWTH
BILIRUB DIRECT SERPL-MCNC: 1.1 MG/DL (ref 0–0.2)
BILIRUB SERPL-MCNC: 1.4 MG/DL (ref 0.2–1.3)
BUN SERPL-MCNC: 21 MG/DL (ref 7–30)
BW-1: NORMAL
C* LOCUS: NORMAL
C*: NORMAL
CALCIUM SERPL-MCNC: 7.4 MG/DL (ref 8.5–10.1)
CELL TYPE AUTO: NORMAL
CHANNELSHIFTAUTOB1: -17
CHANNELSHIFTAUTOT1: -30
CHLORIDE SERPL-SCNC: 102 MMOL/L (ref 94–109)
CO2 SERPL-SCNC: 30 MMOL/L (ref 20–32)
CREAT SERPL-MCNC: 0.7 MG/DL (ref 0.66–1.25)
CROSSMATCHDATEAUTO: NORMAL
DONOR AUTO: NORMAL
DONOR IDENTIFICATION: NORMAL
DONORCELLDATE AUTO: NORMAL
DPA1* NMDP: NORMAL
DPA1*: NORMAL
DPB1* NMDP: NORMAL
DPB1*: NORMAL
DQA1*: NORMAL
DQA1*LOCUS: NORMAL
DQB1* LOCUS: NORMAL
DQB1*: NORMAL
DRB1* LOCUS: NORMAL
DRB1*: NORMAL
DRB3* LOCUS: NORMAL
DRB4*: NORMAL
DRSSO TEST METHOD: NORMAL
DSA COMMENTS: NORMAL
DSA PRESENT: NO
DSA TEST METHOD: NORMAL
ERYTHROCYTE [DISTWIDTH] IN BLOOD BY AUTOMATED COUNT: 14.2 % (ref 10–15)
GFR SERPL CREATININE-BSD FRML MDRD: >90 ML/MIN/{1.73_M2}
GLUCOSE BLDC GLUCOMTR-MCNC: 183 MG/DL (ref 70–99)
GLUCOSE BLDC GLUCOMTR-MCNC: 199 MG/DL (ref 70–99)
GLUCOSE BLDC GLUCOMTR-MCNC: 221 MG/DL (ref 70–99)
GLUCOSE BLDC GLUCOMTR-MCNC: 235 MG/DL (ref 70–99)
GLUCOSE BLDC GLUCOMTR-MCNC: 276 MG/DL (ref 70–99)
GLUCOSE BLDC GLUCOMTR-MCNC: 284 MG/DL (ref 70–99)
GLUCOSE BLDC GLUCOMTR-MCNC: 315 MG/DL (ref 70–99)
GLUCOSE SERPL-MCNC: 153 MG/DL (ref 70–99)
HCT VFR BLD AUTO: 23.9 % (ref 40–53)
HGB BLD-MCNC: 7.8 G/DL (ref 13.3–17.7)
MAGNESIUM SERPL-MCNC: 2.4 MG/DL (ref 1.6–2.3)
MCH RBC QN AUTO: 31.7 PG (ref 26.5–33)
MCHC RBC AUTO-ENTMCNC: 32.6 G/DL (ref 31.5–36.5)
MCV RBC AUTO: 97 FL (ref 78–100)
ORGAN: NORMAL
PHOSPHATE SERPL-MCNC: 3 MG/DL (ref 2.5–4.5)
PLATELET # BLD AUTO: 80 10E9/L (ref 150–450)
POS CUT OFF AUTO B: >122
POS CUT OFF AUTO T: >67
POTASSIUM SERPL-SCNC: 3.8 MMOL/L (ref 3.4–5.3)
PROT SERPL-MCNC: 5.5 G/DL (ref 6.8–8.8)
RBC # BLD AUTO: 2.46 10E12/L (ref 4.4–5.9)
RESULT AUTO B1: NORMAL
RESULT AUTO T1: NORMAL
SA1 CELL: NORMAL
SA1 COMMENTS: NORMAL
SA1 HI RISK ABY: NORMAL
SA1 MOD RISK ABY: NORMAL
SA1 TEST METHOD: NORMAL
SA2 CELL: NORMAL
SA2 COMMENTS: NORMAL
SA2 HI RISK ABY UA: NORMAL
SA2 MOD RISK ABY: NORMAL
SA2 TEST METHOD: NORMAL
SERUM DATE AUTO B1: NORMAL
SERUM DATE AUTO T1: NORMAL
SODIUM SERPL-SCNC: 136 MMOL/L (ref 133–144)
SPECIMEN SOURCE: NORMAL
TACROLIMUS BLD-MCNC: 4 UG/L (ref 5–15)
TESTMETHODAUTO: NORMAL
TME LAST DOSE: ABNORMAL H
TREATMENT AUTO B1: NORMAL
TREATMENT AUTO T1: NORMAL
WBC # BLD AUTO: 6.4 10E9/L (ref 4–11)

## 2018-12-27 PROCEDURE — 40000275 ZZH STATISTIC RCP TIME EA 10 MIN

## 2018-12-27 PROCEDURE — T1013 SIGN LANG/ORAL INTERPRETER: HCPCS | Mod: U3

## 2018-12-27 PROCEDURE — 99233 SBSQ HOSP IP/OBS HIGH 50: CPT | Mod: GC | Performed by: INTERNAL MEDICINE

## 2018-12-27 PROCEDURE — 25000131 ZZH RX MED GY IP 250 OP 636 PS 637: Mod: GY | Performed by: TRANSPLANT SURGERY

## 2018-12-27 PROCEDURE — 36415 COLL VENOUS BLD VENIPUNCTURE: CPT | Performed by: NURSE PRACTITIONER

## 2018-12-27 PROCEDURE — A9270 NON-COVERED ITEM OR SERVICE: HCPCS | Mod: GY | Performed by: NURSE PRACTITIONER

## 2018-12-27 PROCEDURE — 85027 COMPLETE CBC AUTOMATED: CPT | Performed by: NURSE PRACTITIONER

## 2018-12-27 PROCEDURE — 00000146 ZZHCL STATISTIC GLUCOSE BY METER IP

## 2018-12-27 PROCEDURE — A9270 NON-COVERED ITEM OR SERVICE: HCPCS | Mod: GY | Performed by: SURGERY

## 2018-12-27 PROCEDURE — 80197 ASSAY OF TACROLIMUS: CPT | Performed by: TRANSPLANT SURGERY

## 2018-12-27 PROCEDURE — 25000128 H RX IP 250 OP 636: Performed by: NURSE PRACTITIONER

## 2018-12-27 PROCEDURE — 25000131 ZZH RX MED GY IP 250 OP 636 PS 637: Mod: GY | Performed by: NURSE PRACTITIONER

## 2018-12-27 PROCEDURE — A9270 NON-COVERED ITEM OR SERVICE: HCPCS | Mod: GY | Performed by: STUDENT IN AN ORGANIZED HEALTH CARE EDUCATION/TRAINING PROGRAM

## 2018-12-27 PROCEDURE — 25000132 ZZH RX MED GY IP 250 OP 250 PS 637: Mod: GY | Performed by: NURSE PRACTITIONER

## 2018-12-27 PROCEDURE — 97535 SELF CARE MNGMENT TRAINING: CPT | Mod: GO | Performed by: OCCUPATIONAL THERAPIST

## 2018-12-27 PROCEDURE — 25000128 H RX IP 250 OP 636: Performed by: TRANSPLANT SURGERY

## 2018-12-27 PROCEDURE — 80048 BASIC METABOLIC PNL TOTAL CA: CPT | Performed by: NURSE PRACTITIONER

## 2018-12-27 PROCEDURE — 74183 MRI ABD W/O CNTR FLWD CNTR: CPT

## 2018-12-27 PROCEDURE — 12000026 ZZH R&B TRANSPLANT

## 2018-12-27 PROCEDURE — 25000125 ZZHC RX 250: Performed by: SURGERY

## 2018-12-27 PROCEDURE — 40000274 ZZH STATISTIC RCP CONSULT EA 30 MIN

## 2018-12-27 PROCEDURE — 84100 ASSAY OF PHOSPHORUS: CPT | Performed by: NURSE PRACTITIONER

## 2018-12-27 PROCEDURE — 25000131 ZZH RX MED GY IP 250 OP 636 PS 637: Mod: GY | Performed by: SURGERY

## 2018-12-27 PROCEDURE — 25000132 ZZH RX MED GY IP 250 OP 250 PS 637: Mod: GY | Performed by: STUDENT IN AN ORGANIZED HEALTH CARE EDUCATION/TRAINING PROGRAM

## 2018-12-27 PROCEDURE — 80076 HEPATIC FUNCTION PANEL: CPT | Performed by: NURSE PRACTITIONER

## 2018-12-27 PROCEDURE — A9581 GADOXETATE DISODIUM INJ: HCPCS | Performed by: TRANSPLANT SURGERY

## 2018-12-27 PROCEDURE — 83735 ASSAY OF MAGNESIUM: CPT | Performed by: NURSE PRACTITIONER

## 2018-12-27 PROCEDURE — 25000128 H RX IP 250 OP 636: Performed by: SURGERY

## 2018-12-27 PROCEDURE — 25000125 ZZHC RX 250: Performed by: TRANSPLANT SURGERY

## 2018-12-27 PROCEDURE — 25000132 ZZH RX MED GY IP 250 OP 250 PS 637: Mod: GY | Performed by: SURGERY

## 2018-12-27 PROCEDURE — A9270 NON-COVERED ITEM OR SERVICE: HCPCS | Mod: GY | Performed by: PHYSICIAN ASSISTANT

## 2018-12-27 PROCEDURE — 40000133 ZZH STATISTIC OT WARD VISIT: Performed by: OCCUPATIONAL THERAPIST

## 2018-12-27 PROCEDURE — 25000132 ZZH RX MED GY IP 250 OP 250 PS 637: Mod: GY | Performed by: PHYSICIAN ASSISTANT

## 2018-12-27 PROCEDURE — 25500064 ZZH RX 255 OP 636: Performed by: TRANSPLANT SURGERY

## 2018-12-27 PROCEDURE — 94640 AIRWAY INHALATION TREATMENT: CPT

## 2018-12-27 RX ORDER — TACROLIMUS 5 MG/1
5 CAPSULE ORAL
Status: DISCONTINUED | OUTPATIENT
Start: 2018-12-27 | End: 2018-12-29

## 2018-12-27 RX ORDER — LIDOCAINE 40 MG/G
CREAM TOPICAL
Status: DISCONTINUED | OUTPATIENT
Start: 2018-12-27 | End: 2018-12-28

## 2018-12-27 RX ORDER — BISACODYL 10 MG
10 SUPPOSITORY, RECTAL RECTAL ONCE
Status: DISCONTINUED | OUTPATIENT
Start: 2018-12-27 | End: 2019-01-07

## 2018-12-27 RX ORDER — FUROSEMIDE 10 MG/ML
20 INJECTION INTRAMUSCULAR; INTRAVENOUS 2 TIMES DAILY
Status: DISCONTINUED | OUTPATIENT
Start: 2018-12-27 | End: 2018-12-30

## 2018-12-27 RX ORDER — TACROLIMUS 1 MG/1
4 CAPSULE ORAL ONCE
Status: COMPLETED | OUTPATIENT
Start: 2018-12-27 | End: 2018-12-27

## 2018-12-27 RX ORDER — FUROSEMIDE 10 MG/ML
40 INJECTION INTRAMUSCULAR; INTRAVENOUS 2 TIMES DAILY
Status: DISCONTINUED | OUTPATIENT
Start: 2018-12-27 | End: 2018-12-27

## 2018-12-27 RX ORDER — PREDNISONE 5 MG/1
5 TABLET ORAL DAILY
Status: DISCONTINUED | OUTPATIENT
Start: 2018-12-28 | End: 2018-12-31

## 2018-12-27 RX ORDER — FUROSEMIDE 10 MG/ML
40 INJECTION INTRAMUSCULAR; INTRAVENOUS ONCE
Status: COMPLETED | OUTPATIENT
Start: 2018-12-27 | End: 2018-12-27

## 2018-12-27 RX ORDER — IPRATROPIUM BROMIDE AND ALBUTEROL SULFATE 2.5; .5 MG/3ML; MG/3ML
3 SOLUTION RESPIRATORY (INHALATION) EVERY 4 HOURS PRN
Status: DISCONTINUED | OUTPATIENT
Start: 2018-12-27 | End: 2019-01-07

## 2018-12-27 RX ORDER — INDOMETHACIN 50 MG/1
100 SUPPOSITORY RECTAL
Status: DISCONTINUED | OUTPATIENT
Start: 2018-12-27 | End: 2018-12-28

## 2018-12-27 RX ADMIN — SENNOSIDES AND DOCUSATE SODIUM 2 TABLET: 8.6; 5 TABLET ORAL at 21:14

## 2018-12-27 RX ADMIN — OMEPRAZOLE 20 MG: 20 CAPSULE, DELAYED RELEASE ORAL at 13:07

## 2018-12-27 RX ADMIN — TACROLIMUS 3 MG: 1 CAPSULE ORAL at 08:24

## 2018-12-27 RX ADMIN — METOPROLOL TARTRATE 75 MG: 50 TABLET ORAL at 08:18

## 2018-12-27 RX ADMIN — PREDNISONE 10 MG: 10 TABLET ORAL at 08:16

## 2018-12-27 RX ADMIN — MYCOPHENOLATE MOFETIL 1000 MG: 250 CAPSULE ORAL at 08:24

## 2018-12-27 RX ADMIN — SENNOSIDES AND DOCUSATE SODIUM 2 TABLET: 8.6; 5 TABLET ORAL at 13:07

## 2018-12-27 RX ADMIN — VALGANCICLOVIR 450 MG: 450 TABLET, FILM COATED ORAL at 08:24

## 2018-12-27 RX ADMIN — POLYETHYLENE GLYCOL 3350 17 G: 17 POWDER, FOR SOLUTION ORAL at 13:06

## 2018-12-27 RX ADMIN — FUROSEMIDE 40 MG: 10 INJECTION, SOLUTION INTRAVENOUS at 08:11

## 2018-12-27 RX ADMIN — OXYCODONE HYDROCHLORIDE 10 MG: 5 TABLET ORAL at 00:14

## 2018-12-27 RX ADMIN — TACROLIMUS 5 MG: 5 CAPSULE ORAL at 18:46

## 2018-12-27 RX ADMIN — ALFUZOSIN HYDROCHLORIDE 10 MG: 10 TABLET, EXTENDED RELEASE ORAL at 13:07

## 2018-12-27 RX ADMIN — TACROLIMUS 4 MG: 1 CAPSULE ORAL at 13:12

## 2018-12-27 RX ADMIN — ASPIRIN 325 MG: 325 TABLET, DELAYED RELEASE ORAL at 13:07

## 2018-12-27 RX ADMIN — METOPROLOL TARTRATE 75 MG: 50 TABLET ORAL at 21:14

## 2018-12-27 RX ADMIN — GADOXETATE DISODIUM 8.5 ML: 181.43 INJECTION, SOLUTION INTRAVENOUS at 15:15

## 2018-12-27 RX ADMIN — SULFAMETHOXAZOLE AND TRIMETHOPRIM 1 TABLET: 400; 80 TABLET ORAL at 08:24

## 2018-12-27 RX ADMIN — INSULIN ASPART 1 UNITS: 100 INJECTION, SOLUTION INTRAVENOUS; SUBCUTANEOUS at 06:08

## 2018-12-27 RX ADMIN — FLUCONAZOLE 200 MG: 2 INJECTION, SOLUTION INTRAVENOUS at 22:24

## 2018-12-27 RX ADMIN — AMIODARONE HYDROCHLORIDE 0.5 MG/MIN: 50 INJECTION, SOLUTION INTRAVENOUS at 08:24

## 2018-12-27 RX ADMIN — OXYCODONE HYDROCHLORIDE 5 MG: 5 TABLET ORAL at 21:14

## 2018-12-27 RX ADMIN — VANCOMYCIN HYDROCHLORIDE 1500 MG: 10 INJECTION, POWDER, LYOPHILIZED, FOR SOLUTION INTRAVENOUS at 00:01

## 2018-12-27 RX ADMIN — IPRATROPIUM BROMIDE AND ALBUTEROL SULFATE 3 ML: .5; 3 SOLUTION RESPIRATORY (INHALATION) at 08:36

## 2018-12-27 RX ADMIN — AMLODIPINE BESYLATE 10 MG: 10 TABLET ORAL at 08:16

## 2018-12-27 RX ADMIN — MYCOPHENOLATE MOFETIL 1000 MG: 250 CAPSULE ORAL at 18:46

## 2018-12-27 RX ADMIN — FUROSEMIDE 20 MG: 10 INJECTION, SOLUTION INTRAVENOUS at 21:13

## 2018-12-27 RX ADMIN — VANCOMYCIN HYDROCHLORIDE 1500 MG: 10 INJECTION, POWDER, LYOPHILIZED, FOR SOLUTION INTRAVENOUS at 11:57

## 2018-12-27 RX ADMIN — AMIODARONE HYDROCHLORIDE 0.5 MG/MIN: 50 INJECTION, SOLUTION INTRAVENOUS at 03:49

## 2018-12-27 ASSESSMENT — ACTIVITIES OF DAILY LIVING (ADL)
ADLS_ACUITY_SCORE: 13

## 2018-12-27 ASSESSMENT — MIFFLIN-ST. JEOR: SCORE: 1660.63

## 2018-12-27 NOTE — PLAN OF CARE
Neuro: A&Ox4.   Cardiac: SR. VSS. Bp 140/70's , HR 70 NSR .     Respiratory: Sating 98 % on RA.  GI/: cota patent draining adequate pink colored urine output. BM X1  Diet/appetite: Tolerating clear liquid diet well.   Activity:  Assist of one up to chair and and ambulate to the bathroom.  Pain: At acceptable level on current regimen.   Skin: No new deficits noted.  LDA's: PIV patent, infusing , no s/s of infiltration noted.   Plan: Continue with POC. Notify primary team with changes.

## 2018-12-27 NOTE — PROGRESS NOTES
2:47 PM  December 27, 2018  Final positive donor sputum culture results for staph aureus have been uploaded to [facility].  Donor ID [id number].  Dr. Agudelo notified of results.  Sofi Rivero RN, [facility]  On Call Organ Coordinator

## 2018-12-27 NOTE — DISCHARGE SUMMARY
[facility]  I evaluated the patient today.  I reviewed the discharge plan with the fellow, and agree with the final assessment and plan for discharge as noted in the discharge summary.  I personally spent  30 minutes or less  today on discharge activities for this patient.      Emil Echevarria MD, JOSÉ LUIS  Professor of Surgery  [employer] [facility] [employer]  Surgical Director of Liver Transplant Program  Executive Medical Director of Pediatric Transplantation  Discharge Summary  Transplant Surgery    Date of Admission:  2018  Date of Discharge:  2019  Discharging Provider: Emil Echevarria MD / Inez Baker NP     Discharge Diagnoses   Principal Problem:    Liver transplant recipient (H)  Active Problems:    Immunosuppressed status (H)    Hypervolemia    Atrial fibrillation with rapid ventricular response (H)    Hematuria    Bilateral wheezing    Hypoxia    Hyperglycemia    Pre-diabetes    Essential hypertension    Constipation    Biliary stricture of transplanted liver (H)      Procedure/Surgery Information   Procedure: Procedure(s):  TRANSPLANT LIVER RECIPIENT  DONOR  Herniorrhaphy inguinal   Surgeon(s): Surgeon(s) and Role:     * Emil Echevarria MD - Primary     * Yasir Sharif MD - Assisting     * Sebastián Agudelo MD - Assisting     * Barbara Jackson MD - Resident - Assisting       Non-operative procedures 18 ERCP: Benign appearing ampullary and anastomotic stenosis managed by biliary sphincterotomy and biliary stent placement      History of Present Illness   Loy Limon is an 63 year old male with a history of Laennec's cirrhosis with HCC. S/p  donor liver transplant and right inguinal hernia repair with biologic mesh on 18. Notable for small arterial anastomosis (donor common hepatic with atherosclerosis to recipient replaced right hepatic).    Hospital Course   Liver transplant / biliary  anastomotic stricture:   LFTs initially trended down as expected after transplant. AP began to increase again on POD #3. 12/27 MRCP was concering for biliary anastomotic narrowing. 12/28 ERCP: benign appearing ampullary and anastomotic stenosis managed by biliary sphincterotomy and biliary stent placement. Patient was started on ursodiol (250mg tabs due to insurance coverage restrictions). Patient received steroids 12/30-1/1 for presumed rejection while awaiting biopsy and pathology. Biopsy 12/31: no rejection, mild portal and pericellular fibrosis associated with hepatocellular atrophy, minimal portal inflammation and widespread bile ductular proliferation. LFTs slowly trended downward. Plan on continuing prednisone until LFTs have normalized.    Immunosuppression:  Induction immunosuppression with steroids.  Maintenance immunosupression with mycophenolate (Cellcept) and tacrolimus (Prograf). Prednisone 5mg daily added until LFTs normalize. Will discharge with enough to last through follow up appointment. Tacrolimus level goal 10-15 (12 hour trough).  Level 9.2 on day of discharge, dose increased slightly. Infectious prophylaxis with bactrim (x6 months) and valganciclovir (x12 weeks).      Transplant coordinator Jeny Szymanski [phone].  Biliary stent:  YES, due for next ERCP due end of February  Donor status:  DBD  CMV D- / R +  EBV D + / R +    Vascular prophylaxis: Due to small hepatic artery anastomosis. Received dextran x 48 hrs post-op, and will discharge on aspirin 325mg daily.     Atrial fibrillation with RVR: On 12/25. Started on scheduled metoprolol and amiodarone bolus/drip. Troponin negative. 12/25 Echo: severely dilated left atrium, mild to moderate LVH, preserved EF. Converted back to NSR 12/25 PM. Cardiology was consulted. Recommend continued rate control with metoprolol, no indication for anticoagulation.     Non-sustained ventricular tachycardia: Two short runs documented on telemetry. Cardiology  did not recommend further cardiac testing or management.    HTN: On Nadolol prior to transplant. Blood pressures well controlled on amlodipine 10mg daily and metoprolol 75mg oral BID.     Hypoxia/reactive airways: Mild hypoxia and wheezing first few days post-op. CXR showed evidence of bilateral opacities and pulmonary edema. Improved with diuresis. Resolved prior to discharge.    Constipation: After transplant, required multiple agents to control. Will discharge on senna and miralax.    Hyperglycemia: Pre-diabetic with A1c 5.8% on admission. Steroid/tacrolimus induced hyperglycemia after transplant. Endocrinology consulted for insulin management. Will discharge on NPH once daily to cover prednisone effect. Tentatively plan on discontinuation of insulin when prednisone is finished (estimate 1 week). Patient received diabetic education but struggled with carb counting and sliding scale. Follow up in Endocrinology Clinic. Patient will continue to test four times daily until follow up with Endocrinology to differentiate between steroid induced hyperglycemia and new onset diabetes after transplant, and to assess whether long acting insulin is needed. Avoid use of oral diabetic medications in newly transplanted livers.     Hypervolemia: Significant post-op edema. Intermittently diuresed during hospital stay and Lymphedema Service consulted. Improved but not resolved by discharge. Discharge on two more days of bumex.    Hematuria: Developed on 12/26 with manipulation of cota. Irrigated by Urology, not obstructed. Resolved, cota removed, and no further issues.    Latent TB: Completed 4 month course of rifampin prior to admission (8/21-12/20).      Consultations This Hospital Stay   SOCIAL WORK IP CONSULT  NUTRITION SERVICES ADULT IP CONSULT  PHYSICAL THERAPY ADULT IP CONSULT  OCCUPATIONAL THERAPY ADULT IP CONSULT  VASCULAR ACCESS CARE ADULT IP CONSULT  PHARMACY IP CONSULT  PHARMACY TO DOSE [facility]  NUTRITION SERVICES ADULT  "IP CONSULT  OCCUPATIONAL THERAPY ADULT IP CONSULT  PHYSICAL THERAPY ADULT IP CONSULT  PHARMACY IP CONSULT  MEDICATION HISTORY IP PHARMACY CONSULT  UROLOGY IP CONSULT  VASCULAR ACCESS CARE ADULT IP CONSULT  VASCULAR ACCESS CARE ADULT IP CONSULT    Physical Exam:  Blood pressure 135/73, pulse 63, temperature 98.2  F (36.8  C), temperature source Oral, resp. rate 16, height 1.702 m (5' 7\"), weight 84.1 kg (185 lb 8 oz), SpO2 98 %.  General Appearance: in no apparent distress.   Skin: normal, warm, dry, No rashes, induration, or jaundice  CV: Well perfused  Lungs: Room air, unlabored  Abdomen: Soft. Incision c/d/i, staples in place.   : No cota  Extremities: edema: present bilaterally. 1+.   Neurologic: awake, alert and oriented x4. Tremor absent.    Data   Most Recent 3 CBC's:  Recent Labs   Lab Test 01/07/19  0610 01/06/19  0612 01/05/19  0547   WBC 5.5 6.1 6.0   HGB 8.0* 8.6* 8.5*   MCV 97 97 96    274 236     Most Recent 3 BMP's:  Recent Labs   Lab Test 01/07/19  0610 01/06/19  0612 01/05/19  0547    137 138   POTASSIUM 4.8 5.1 5.0   CHLORIDE 106 107 107   CO2 26 24 24   BUN 14 15 18   CR 0.87 0.85 0.92   ANIONGAP 6 6 7   YELENA 7.8* 7.7* 7.2*   * 104* 96     Most Recent 2 LFT's:  Recent Labs   Lab Test 01/07/19  0610 01/06/19  0612   AST 15 26   * 159*   ALKPHOS 641* 701*   BILITOTAL 0.6 0.5       Code Status   Full    Primary Care Physician   Jaswinder Anne    Allergies   No Known Allergies  Medications Prior to Admission:  Medications Prior to Admission   Medication Sig Dispense Refill Last Dose     alfuzosin (UROXATRAL) 10 MG 24 hr tablet Take 10 mg by mouth daily   Not Taking     nadolol (CORGARD) 20 MG tablet Take 20 mg by mouth daily        omeprazole (PRILOSEC) 20 MG CR capsule Take 20 mg by mouth daily    Taking     rifampin (RIFADIN) 300 MG capsule Take 2 capsules (600 mg) by mouth daily 60 capsule 3 Taking     rifaximin (XIFAXAN) 550 MG TABS tablet Take 1,100 mg by mouth " daily        traMADol (ULTRAM) 50 MG tablet Take one po bid prn severe pain 20 tablet 0 Taking     Discharge Medications    Current Discharge Medication List      START taking these medications    Details   amLODIPine (NORVASC) 10 MG tablet Take 1 tablet (10 mg) by mouth daily  Qty: 30 tablet, Refills: 11    Associated Diagnoses: Essential hypertension      aspirin (ASA) 325 MG EC tablet Take 1 tablet (325 mg) by mouth daily  Qty: 30 tablet, Refills: 5    Associated Diagnoses: Liver transplant recipient (H)      blood glucose (NO BRAND SPECIFIED) lancets standard Use to test blood sugar 4 times daily or as directed.  Qty: 200 each, Refills: 11    Associated Diagnoses: Hyperglycemia      blood glucose (ONETOUCH VERIO IQ) test strip Use to test blood sugar 4 times daily or as directed.  Qty: 200 strip, Refills: 11    Comments: Onetouch Verio Flex strips  Associated Diagnoses: Hyperglycemia      bumetanide (BUMEX) 1 MG tablet Take 1 tablet (1 mg) by mouth daily for 2 days  Qty: 2 tablet, Refills: 0    Associated Diagnoses: Other hypervolemia      glucose 40 % (400 mg/mL) GEL gel Take 15-30 g by mouth every 15 minutes as needed for low blood sugar  Qty: 30 g, Refills: 3    Associated Diagnoses: Hyperglycemia      insulin isophane human (HUMULIN N PEN) 100 UNIT/ML injection 10 units before breakfast daily. STOP when done with prednisone pills.  Qty: 1 mL, Refills: 11    Associated Diagnoses: Hyperglycemia      insulin pen needle (32G X 4 MM) 32G X 4 MM miscellaneous Use once pen needles daily or as directed.  Qty: 100 each, Refills: 3    Associated Diagnoses: Hyperglycemia      metoprolol tartrate 75 MG TABS Take 75 mg by mouth 2 times daily  Qty: 60 tablet, Refills: 11    Associated Diagnoses: Atrial fibrillation with rapid ventricular response (H)      multivitamin w/minerals (THERA-VIT-M) tablet Take 1 tablet by mouth daily  Qty: 30 tablet, Refills: 0    Associated Diagnoses: Liver transplant recipient (H)       mycophenolate (GENERIC EQUIVALENT) 250 MG capsule Take 4 capsules (1,000 mg) by mouth 2 times daily  Qty: 240 capsule, Refills: 11    Associated Diagnoses: Liver transplant recipient (H)      polyethylene glycol (MIRALAX/GLYCOLAX) powder Take 17 g (1 capful) by mouth 2 times daily Hold for loose stools  Qty: 850 g, Refills: 3    Associated Diagnoses: Liver transplant recipient (H)      predniSONE (DELTASONE) 5 MG tablet Take 5 mg by mouth daily for 7 days.  Qty: 7 tablet, Refills: 0    Associated Diagnoses: Liver transplant recipient (H)      senna-docusate (SENOKOT-S/PERICOLACE) 8.6-50 MG tablet Take 2 tablets by mouth 2 times daily Hold for loose stools  Qty: 120 tablet, Refills: 0    Associated Diagnoses: Liver transplant recipient (H)      Sharps Container MISC 1 each daily  Qty: 1 each, Refills: 2    Associated Diagnoses: Hyperglycemia      simethicone (MYLICON) 80 MG chewable tablet Take 1 tablet (80 mg) by mouth every 6 hours as needed for flatulence or cramping  Qty: 40 tablet, Refills: 0    Associated Diagnoses: Liver transplant recipient (H)      sulfamethoxazole-trimethoprim (BACTRIM/SEPTRA) 400-80 MG tablet Take 1 tablet by mouth daily  Qty: 30 tablet, Refills: 5    Associated Diagnoses: Liver transplant recipient (H)      tacrolimus (GENERIC EQUIVALENT) 0.5 MG capsule Take 1 cap by mouth twice daily as directed by Transplant Center for dose adjustment  Qty: 60 capsule, Refills: 0    Associated Diagnoses: Liver transplant recipient (H)      tacrolimus (GENERIC EQUIVALENT) 1 MG capsule Take 7 capsules (7 mg) by mouth 2 times daily  Qty: 420 capsule, Refills: 0    Associated Diagnoses: Liver transplant recipient (H)      ursodiol (ACTIGALL) 250 MG tablet Take 1 tablet (250 mg) by mouth 2 times daily  Qty: 60 tablet, Refills: 11    Associated Diagnoses: Liver transplant recipient (H)      valGANciclovir (VALCYTE) 450 MG tablet Take 1 tablet (450 mg) by mouth daily  Qty: 69 tablet, Refills: 0    Associated  Diagnoses: Liver transplant recipient (H)         CONTINUE these medications which have CHANGED    Details   traMADol (ULTRAM) 50 MG tablet Take 1 tablet (50 mg) by mouth every 6 hours as needed for moderate pain  Qty: 15 tablet, Refills: 0    Associated Diagnoses: Liver transplant recipient (H)         CONTINUE these medications which have NOT CHANGED    Details   alfuzosin (UROXATRAL) 10 MG 24 hr tablet Take 10 mg by mouth daily    Associated Diagnoses: Alcoholic cirrhosis of liver without ascites (H); HCC (hepatocellular carcinoma) (H)      omeprazole (PRILOSEC) 20 MG CR capsule Take 20 mg by mouth daily          STOP taking these medications       nadolol (CORGARD) 20 MG tablet Comments:   Reason for Stopping:         rifampin (RIFADIN) 300 MG capsule Comments:   Reason for Stopping:         rifaximin (XIFAXAN) 550 MG TABS tablet Comments:   Reason for Stopping:             Discharge Orders       ALCOHOL WIPES PER BOX     Medication Therapy Management Referral      Home care nursing referral     Reason for your hospital stay    Liver transplant 12/22/18     Adult [facility] Follow-up and recommended labs and tests    1.  [facility] ([address] [place] [address])  Monday 1/14, 8:45am.  Please bring all medications, lab book, and medication card with you.  Do not take your morning dose of tacrolimus until after labs are drawn.     2.  Labs every Monday & Thursday:  CMP, CBC, Magnesium, Phosphorus, tacrolimus level  3.  ERCP on 2/18/19  4.  Follow up in Endocrinology Clinic in 1-2 weeks for blood glucose management     Activity    Your activity upon discharge: No lifting greater than 10 pounds for at least 6 weeks, no driving while taking narcotic pain medications, avoid bath tubs, hot tubs, and swimming for at least 3 weeks.     Monitor and record    blood glucose 4 times a day, before meals and at bedtime     When to contact your care team    Notify your coordinator if you have pain over your liver,  fever greater than 100.5F, redness or drainage at incision, or yellowing of skin or eyes.    Notify your coordinator immediately if you are ever unable to take your immunosuppressive medications for any reason.    Transplant Coordinator Jeny [phone]     Wound care and dressings    Instructions to care for your wound at home: Keep your incision clean and dry. Wash daily with soap and water in the shower, but do not soak or scrub.     Diet    Follow this diet upon discharge:  Regular diet. No undercooked meat or fish.         Discharge Disposition   Discharged to home  Condition at discharge: Stable    Attestation:  I have reviewed today's vital signs, notes, medications, labs and imaging. Amount of time performed on this discharge summary was > 30 minutes.    Inez Baker NP

## 2018-12-27 NOTE — PLAN OF CARE
Neuro: A/Ox4.  Cardiac: SR with HR 60 - 80. VSS. Afebrile.    Respiratory: O2 sats stable on RA while awake. 1L NC while asleep.   GI/: Cronin intact with adequate UOP.   Diet/appetite: NPO at 0000 for ERCP today.   Activity:  Ax 1. Ambulated in marie x1.   Pain: At acceptable level on current regimen. PRN oxycodone administered x2.   Skin: Intact, no new deficits noted.   LDA's: PIV x3. Amio gtt infusing at 30mL/hr. R KARINE x2 intact. Incision CDI.     Plan: Continue with POC. Notify primary team with changes.

## 2018-12-27 NOTE — PLAN OF CARE
OT/6B:  Discharge Planner OT   Patient plan for discharge: unstated  Current status: Pt mod A supine to sit EOB. Pt CGA EOB to chair transfer. Pt completed seated ADLs with set-up  Barriers to return to prior living situation: medical status, post op precautions   Recommendations for discharge: TCU  Rationale for recommendations: Pt below baseline due to post op precautions, anticipate will be able to progress to home with assist        Entered by: Latoya Little 12/27/2018 11:55 AM

## 2018-12-27 NOTE — PROGRESS NOTES
Transplant Surgery  Inpatient Daily Progress Note  12/27/2018    Assessment & Plan: 64 yo M hx Laennec's cirrhosis with HCC. S/p DBD OLT with right inguinal hernia repair with biologic mesh 12/22/18. Notable for small arterial anastomosis (donor common hepatic with atherosclerosis to recipient replaced right hepatic).    Graft function: POD #5. AP increased, remainder of LFTs down. 12/26 US: small fluid collection, otherwise normal. MRCP today.  Immunosuppression management: Induction with steroid pulse.  Methylpred:  Taper per protocol, continue pred 5 until tac level at goal  MMF: 1000mg BID  Tacro: Goal level 10-15. Level 4, increase today. Anticipate increase in level in 1-2 weeks after rifampin is fully metabolized (last dose 12/20).  Complexity of management: High. Contributing factors: induction  Hematology:   Anemia: Hgb stable  Vascular prophylaxis: Due to small HA anastomosis. Received dextran X48 hrs, on aspirin 325.   Cardiorespiratory:   Atrial fibrillation with RVR: On 12/25. Started on scheduled metoprolol and amio bolus/drip. Trops low/negative. 12/25 Echo: severely dilated left atrium, mild to moderate LVH, preserved EF. Converted back to NSR 12/25 PM. Run of aberrant AF vs VT (14 beats) yesterday. Discontinue amio gtt today and increase metoprolol.  Hypoxia/dyspnea/reactive airways: Mild hypoxia and wheezing, improved today. Continue PRN nebs and diuresis.  HTN: Amlodipine 10mg, increase metoprolol to 75mg oral BID.  GI/Nutrition: Full liquids w/ supplements after MRCP  Constipation: Large gastric bubble on CXR yesterday. Supp today. Continue senna, miralax. Hold MOM due to high Mag level.  Endocrine:   Hyperglycemia: A1c 5.8% on admission. Steroid induced hyperglycemia. Glucoses better this morning on high sliding scale insulin.  Fluid/Electrolytes:   Hypervolemia: Weight up 8kg. Stop IVF and lasix IV 20mg BID.  :   Hematuria: Developed 12/26 with manipulation of cota. Improved today, but not  yet resolved.  Bladder spasms: Better today. B&O supp ordered.  Penile edema: Cronin in place. Diurese today.   Infectious disease: Tmax 100.6F, now afebrile  Latent TB: Completed 4 month course of rifampin prior to admission (8/21-12/20).  Prophylaxis: DVT, fall, GI, continue diflucan given herniorraphy with mesh. Stop Vanco.  Disposition: Transfer to     Medical Decision Making: Medium    IRVIN/Fellow/Resident Provider: Inez Baker NP [id number]    Faculty: Shaila De La Cruz MD    __________________________________________________________________  Transplant History: Admitted 12/21/2018 for DDLT and RIH repair (XenMatrix mesh).   The patient has a history of liver failure due to Laennec's cirrhosis with HCC.    12/22/2018 (Liver), Postoperative day: 5     Interval History: + flatus, feeling OK, bothered by pedal edema, reporting trace drainage from incision. 14 beat VT vs aberrant AF yesterday afternoon.  ROS:   A 10-point review of systems was negative except as noted above.    Curent Meds:    alfuzosin ER  10 mg Oral Daily with breakfast     amLODIPine  10 mg Oral Daily     aspirin  325 mg Oral Daily     bisacodyl  10 mg Rectal Once     fluconazole  200 mg Intravenous Q24H     furosemide  40 mg Intravenous BID     insulin aspart  1-10 Units Subcutaneous TID AC     insulin aspart  1-7 Units Subcutaneous At Bedtime     insulin aspart   Subcutaneous TID AC     ipratropium - albuterol 0.5 mg/2.5 mg/3 mL  3 mL Nebulization 4x daily     metoprolol tartrate  75 mg Oral BID     mycophenolate  1,000 mg Oral BID IS     omeprazole  20 mg Oral QAM AC     polyethylene glycol  17 g Oral Daily     [START ON 12/28/2018] predniSONE  5 mg Oral Daily     senna-docusate  2 tablet Oral or Feeding Tube BID     sodium chloride (PF)  3 mL Intravenous Q8H     sulfamethoxazole-trimethoprim  1 tablet Oral or Feeding Tube Daily     tacrolimus  3 mg Oral BID IS     valGANciclovir  450 mg Oral Daily     vancomycin (VANCOCIN) IV  1,500 mg  "Intravenous Q12H       Physical Exam:     Admit Weight: 84.6 kg (186 lb 9.6 oz)    Current Vitals:   /73 (BP Location: Right arm)   Pulse 72   Temp 98.3  F (36.8  C) (Oral)   Resp 16   Ht 1.702 m (5' 7\")   Wt 91.7 kg (202 lb 2.6 oz)   SpO2 97%   BMI 31.66 kg/m      Vital sign ranges:    Temp:  [98.2  F (36.8  C)-100.6  F (38.1  C)] 98.3  F (36.8  C)  Heart Rate:  [63-78] 63  Resp:  [16-18] 16  BP: (129-170)/(65-94) 152/73  SpO2:  [94 %-100 %] 97 %  Patient Vitals for the past 24 hrs:   BP Temp Temp src Heart Rate Resp SpO2 Weight   12/27/18 0836 -- -- -- -- 16 97 % --   12/27/18 0740 152/73 98.3  F (36.8  C) Oral 63 16 96 % --   12/27/18 0700 -- -- -- -- -- -- 91.7 kg (202 lb 2.6 oz)   12/27/18 0339 145/70 98.2  F (36.8  C) Oral 70 18 95 % --   12/26/18 2356 151/79 98.8  F (37.1  C) Oral 63 18 100 % --   12/26/18 1944 129/65 98.9  F (37.2  C) Oral 70 16 95 % --   12/26/18 1613 -- -- -- -- 16 95 % --   12/26/18 1604 -- -- -- -- -- 94 % --   12/26/18 1539 140/67 98.9  F (37.2  C) Oral 71 16 98 % --   12/26/18 1355 141/78 99  F (37.2  C) Oral 78 -- 98 % --   12/26/18 1343 157/85 -- -- -- -- -- --   12/26/18 1325 (!) 170/94 100.6  F (38.1  C) Oral 74 16 98 % --   12/26/18 1321 -- -- -- -- 16 96 % --     General Appearance: in no apparent distress.   Skin: normal, warm, dry, No rashes, induration, or jaundice  Heart: SR w/ PACs  Lungs: without wheezes, diminished  Abdomen: abd soft, slightly distended. Incision c/d/i, staples in place. KARINE X2- serosang.   : The genitalia are edematous   Extremities: edema: present bilaterally. 2+. There is no skin breakdown.  Neurologic: awake, alert and oriented x4. Tremor absent.      Data:   CMP  Recent Labs   Lab 12/27/18  0512 12/26/18  0523  12/23/18  0404  12/22/18 2010 12/22/18  0013    134   < > 144   < > 139   < > 139   POTASSIUM 3.8 3.8   < > 3.6   < > 3.5   < > 4.5   CHLORIDE 102 99   < > 112*   < >  --   --  109   CO2 30 27   < > 24   < >  --   --  26 "   * 218*   < > 107*   < > 172*   < > 167*   BUN 21 25   < > 18   < >  --   --  10   CR 0.70 0.75   < > 0.66   < >  --   --  0.50*   GFRESTIMATED >90 >90   < > >90   < >  --   --  >90   GFRESTBLACK >90 >90   < > >90   < >  --   --  >90   YELENA 7.4* 7.4*   < > 6.9*   < >  --   --  7.7*   ICAW  --   --   --  4.5  --  4.8   < >  --    MAG 2.4* 2.6*   < > 1.7  --   --   --  2.0   PHOS 3.0 2.9   < > 4.4  --   --   --  2.8   AMYLASE  --   --   --  46  --   --   --  67   LIPASE  --   --   --  93  --   --   --   --    ALBUMIN 1.7* 1.9*   < > 1.6*   < >  --   --  2.5*   BILITOTAL 1.4* 1.7*   < > 1.7*   < >  --   --  1.7*   ALKPHOS 769* 482*   < > 166*   < >  --   --  408*   AST 97* 82*   < > 773*   < >  --   --  48*   * 283*   < > 602*   < >  --   --  40    < > = values in this interval not displayed.     CBC  Recent Labs   Lab 12/27/18  0512 12/26/18  0910  12/22/18 2121   HGB 7.8* 8.2*   < > 9.3*   WBC 6.4 10.0   < > 8.0   PLT 80* 81*   < > 66*   A1C  --   --   --  5.8*    < > = values in this interval not displayed.     COAGS  Recent Labs   Lab 12/23/18  1204 12/23/18  0404 12/22/18 2121 12/22/18  0013   INR 1.36* 1.61* 2.04* 1.77*   PTT  --   --  40* 38*      Urinalysis  Recent Labs   Lab Test 12/25/18  0732 12/22/18  0034  07/19/18  1133   COLOR Yellow Yellow   < > Yellow   APPEARANCE Slightly Cloudy Clear   < > Clear   URINEGLC Negative Negative   < > Negative   URINEBILI Small* Negative   < > Negative   URINEKETONE Negative Negative   < > Negative   SG 1.021 1.012   < > 1.009   UBLD Moderate* Trace*   < > Negative   URINEPH 6.0 6.5   < > 8.0*   PROTEIN 30* Negative   < > Negative   NITRITE Negative Negative   < > Negative   LEUKEST Negative Negative   < > Negative   RBCU 7* 3*   < >  --    WBCU 1 1   < >  --    UTPG  --   --   --  Unable to calculate due to low value    < > = values in this interval not displayed.     Virology:  Hepatitis C Antibody   Date Value Ref Range Status   12/22/2018 Nonreactive  NR^Nonreactive Final     Comment:     Assay performance characteristics have not been established for newborns,   infants, and children

## 2018-12-28 ENCOUNTER — OFFICE VISIT (OUTPATIENT)
Dept: INTERPRETER SERVICES | Facility: CLINIC | Age: 63
End: 2018-12-28
Payer: MEDICARE

## 2018-12-28 ENCOUNTER — APPOINTMENT (OUTPATIENT)
Dept: GENERAL RADIOLOGY | Facility: CLINIC | Age: 63
DRG: 005 | End: 2018-12-28
Attending: INTERNAL MEDICINE
Payer: MEDICARE

## 2018-12-28 ENCOUNTER — APPOINTMENT (OUTPATIENT)
Dept: PHYSICAL THERAPY | Facility: CLINIC | Age: 63
DRG: 005 | End: 2018-12-28
Attending: TRANSPLANT SURGERY
Payer: MEDICARE

## 2018-12-28 ENCOUNTER — APPOINTMENT (OUTPATIENT)
Dept: OCCUPATIONAL THERAPY | Facility: CLINIC | Age: 63
DRG: 005 | End: 2018-12-28
Attending: TRANSPLANT SURGERY
Payer: MEDICARE

## 2018-12-28 LAB
ALBUMIN SERPL-MCNC: 1.7 G/DL (ref 3.4–5)
ALP SERPL-CCNC: 840 U/L (ref 40–150)
ALT SERPL W P-5'-P-CCNC: 241 U/L (ref 0–70)
ANION GAP SERPL CALCULATED.3IONS-SCNC: 8 MMOL/L (ref 3–14)
AST SERPL W P-5'-P-CCNC: 93 U/L (ref 0–45)
BILIRUB DIRECT SERPL-MCNC: 0.9 MG/DL (ref 0–0.2)
BILIRUB SERPL-MCNC: 1.2 MG/DL (ref 0.2–1.3)
BUN SERPL-MCNC: 24 MG/DL (ref 7–30)
CALCIUM SERPL-MCNC: 7.6 MG/DL (ref 8.5–10.1)
CHLORIDE SERPL-SCNC: 102 MMOL/L (ref 94–109)
CO2 SERPL-SCNC: 25 MMOL/L (ref 20–32)
COPATH REPORT: NORMAL
CREAT SERPL-MCNC: 0.8 MG/DL (ref 0.66–1.25)
ERCP: NORMAL
ERYTHROCYTE [DISTWIDTH] IN BLOOD BY AUTOMATED COUNT: 14.1 % (ref 10–15)
GFR SERPL CREATININE-BSD FRML MDRD: >90 ML/MIN/{1.73_M2}
GLUCOSE BLDC GLUCOMTR-MCNC: 164 MG/DL (ref 70–99)
GLUCOSE BLDC GLUCOMTR-MCNC: 170 MG/DL (ref 70–99)
GLUCOSE BLDC GLUCOMTR-MCNC: 179 MG/DL (ref 70–99)
GLUCOSE BLDC GLUCOMTR-MCNC: 214 MG/DL (ref 70–99)
GLUCOSE BLDC GLUCOMTR-MCNC: 226 MG/DL (ref 70–99)
GLUCOSE SERPL-MCNC: 169 MG/DL (ref 70–99)
HCT VFR BLD AUTO: 24.5 % (ref 40–53)
HGB BLD-MCNC: 8 G/DL (ref 13.3–17.7)
MAGNESIUM SERPL-MCNC: 2.3 MG/DL (ref 1.6–2.3)
MCH RBC QN AUTO: 31.6 PG (ref 26.5–33)
MCHC RBC AUTO-ENTMCNC: 32.7 G/DL (ref 31.5–36.5)
MCV RBC AUTO: 97 FL (ref 78–100)
PHOSPHATE SERPL-MCNC: 2.8 MG/DL (ref 2.5–4.5)
PLATELET # BLD AUTO: 95 10E9/L (ref 150–450)
POTASSIUM SERPL-SCNC: 3.8 MMOL/L (ref 3.4–5.3)
PROT SERPL-MCNC: 5.4 G/DL (ref 6.8–8.8)
RBC # BLD AUTO: 2.53 10E12/L (ref 4.4–5.9)
SODIUM SERPL-SCNC: 135 MMOL/L (ref 133–144)
TACROLIMUS BLD-MCNC: 4.7 UG/L (ref 5–15)
TME LAST DOSE: ABNORMAL H
WBC # BLD AUTO: 5.6 10E9/L (ref 4–11)

## 2018-12-28 PROCEDURE — 80048 BASIC METABOLIC PNL TOTAL CA: CPT | Performed by: SURGERY

## 2018-12-28 PROCEDURE — 25000132 ZZH RX MED GY IP 250 OP 250 PS 637: Mod: GY | Performed by: SURGERY

## 2018-12-28 PROCEDURE — 25000125 ZZHC RX 250: Performed by: NURSE ANESTHETIST, CERTIFIED REGISTERED

## 2018-12-28 PROCEDURE — 25000566 ZZH SEVOFLURANE, EA 15 MIN: Performed by: INTERNAL MEDICINE

## 2018-12-28 PROCEDURE — 71000016 ZZH RECOVERY PHASE 1 LEVEL 3 FIRST HR: Performed by: INTERNAL MEDICINE

## 2018-12-28 PROCEDURE — 97530 THERAPEUTIC ACTIVITIES: CPT | Mod: GP

## 2018-12-28 PROCEDURE — 12000026 ZZH R&B TRANSPLANT

## 2018-12-28 PROCEDURE — 25000128 H RX IP 250 OP 636: Performed by: TRANSPLANT SURGERY

## 2018-12-28 PROCEDURE — 27210794 ZZH OR GENERAL SUPPLY STERILE: Performed by: INTERNAL MEDICINE

## 2018-12-28 PROCEDURE — 37000008 ZZH ANESTHESIA TECHNICAL FEE, 1ST 30 MIN: Performed by: INTERNAL MEDICINE

## 2018-12-28 PROCEDURE — T1013 SIGN LANG/ORAL INTERPRETER: HCPCS | Mod: U3

## 2018-12-28 PROCEDURE — C1877 STENT, NON-COAT/COV W/O DEL: HCPCS | Performed by: INTERNAL MEDICINE

## 2018-12-28 PROCEDURE — 36415 COLL VENOUS BLD VENIPUNCTURE: CPT | Performed by: SURGERY

## 2018-12-28 PROCEDURE — 40000277 XR SURGERY CARM FLUORO LESS THAN 5 MIN W STILLS: Mod: TC

## 2018-12-28 PROCEDURE — 97140 MANUAL THERAPY 1/> REGIONS: CPT | Mod: GO | Performed by: OCCUPATIONAL THERAPIST

## 2018-12-28 PROCEDURE — 25000128 H RX IP 250 OP 636: Performed by: NURSE PRACTITIONER

## 2018-12-28 PROCEDURE — A9270 NON-COVERED ITEM OR SERVICE: HCPCS | Mod: GY | Performed by: NURSE PRACTITIONER

## 2018-12-28 PROCEDURE — 36000061 ZZH SURGERY LEVEL 3 W FLUORO 1ST 30 MIN - UMMC: Performed by: INTERNAL MEDICINE

## 2018-12-28 PROCEDURE — 25000128 H RX IP 250 OP 636: Performed by: INTERNAL MEDICINE

## 2018-12-28 PROCEDURE — 25000128 H RX IP 250 OP 636

## 2018-12-28 PROCEDURE — 36000059 ZZH SURGERY LEVEL 3 EA 15 ADDTL MIN UMMC: Performed by: INTERNAL MEDICINE

## 2018-12-28 PROCEDURE — 85027 COMPLETE CBC AUTOMATED: CPT | Performed by: SURGERY

## 2018-12-28 PROCEDURE — 00000146 ZZHCL STATISTIC GLUCOSE BY METER IP

## 2018-12-28 PROCEDURE — A9270 NON-COVERED ITEM OR SERVICE: HCPCS | Performed by: INTERNAL MEDICINE

## 2018-12-28 PROCEDURE — 25000132 ZZH RX MED GY IP 250 OP 250 PS 637: Mod: GY | Performed by: PHYSICIAN ASSISTANT

## 2018-12-28 PROCEDURE — 25000131 ZZH RX MED GY IP 250 OP 636 PS 637: Mod: GY | Performed by: SURGERY

## 2018-12-28 PROCEDURE — 25000131 ZZH RX MED GY IP 250 OP 636 PS 637: Mod: GY | Performed by: NURSE PRACTITIONER

## 2018-12-28 PROCEDURE — 25000125 ZZHC RX 250: Performed by: INTERNAL MEDICINE

## 2018-12-28 PROCEDURE — A9270 NON-COVERED ITEM OR SERVICE: HCPCS | Mod: GY | Performed by: SURGERY

## 2018-12-28 PROCEDURE — 40000170 ZZH STATISTIC PRE-PROCEDURE ASSESSMENT II: Performed by: INTERNAL MEDICINE

## 2018-12-28 PROCEDURE — 25000125 ZZHC RX 250: Performed by: ANESTHESIOLOGY

## 2018-12-28 PROCEDURE — 25000125 ZZHC RX 250: Performed by: NURSE PRACTITIONER

## 2018-12-28 PROCEDURE — 25000128 H RX IP 250 OP 636: Performed by: NURSE ANESTHETIST, CERTIFIED REGISTERED

## 2018-12-28 PROCEDURE — 84100 ASSAY OF PHOSPHORUS: CPT | Performed by: SURGERY

## 2018-12-28 PROCEDURE — 37000009 ZZH ANESTHESIA TECHNICAL FEE, EACH ADDTL 15 MIN: Performed by: INTERNAL MEDICINE

## 2018-12-28 PROCEDURE — 40000193 ZZH STATISTIC PT WARD VISIT

## 2018-12-28 PROCEDURE — 25500064 ZZH RX 255 OP 636: Performed by: INTERNAL MEDICINE

## 2018-12-28 PROCEDURE — 80197 ASSAY OF TACROLIMUS: CPT | Performed by: SURGERY

## 2018-12-28 PROCEDURE — 25000128 H RX IP 250 OP 636: Performed by: SURGERY

## 2018-12-28 PROCEDURE — 25000125 ZZHC RX 250

## 2018-12-28 PROCEDURE — 80076 HEPATIC FUNCTION PANEL: CPT | Performed by: SURGERY

## 2018-12-28 PROCEDURE — 25000132 ZZH RX MED GY IP 250 OP 250 PS 637: Mod: GY | Performed by: NURSE PRACTITIONER

## 2018-12-28 PROCEDURE — C1769 GUIDE WIRE: HCPCS | Performed by: INTERNAL MEDICINE

## 2018-12-28 PROCEDURE — 40000133 ZZH STATISTIC OT WARD VISIT: Performed by: OCCUPATIONAL THERAPIST

## 2018-12-28 PROCEDURE — A9270 NON-COVERED ITEM OR SERVICE: HCPCS | Mod: GY | Performed by: PHYSICIAN ASSISTANT

## 2018-12-28 PROCEDURE — 83735 ASSAY OF MAGNESIUM: CPT | Performed by: SURGERY

## 2018-12-28 PROCEDURE — 71000017 ZZH RECOVERY PHASE 1 LEVEL 3 EA ADDTL HR: Performed by: INTERNAL MEDICINE

## 2018-12-28 PROCEDURE — 0F798DZ DILATION OF COMMON BILE DUCT WITH INTRALUMINAL DEVICE, VIA NATURAL OR ARTIFICIAL OPENING ENDOSCOPIC: ICD-10-PCS | Performed by: INTERNAL MEDICINE

## 2018-12-28 DEVICE — IMPLANTABLE DEVICE
Type: IMPLANTABLE DEVICE | Site: BILE DUCT | Status: NON-FUNCTIONAL
Removed: 2019-02-18

## 2018-12-28 RX ORDER — HYDRALAZINE HYDROCHLORIDE 20 MG/ML
2.5-5 INJECTION INTRAMUSCULAR; INTRAVENOUS EVERY 10 MIN PRN
Status: DISCONTINUED | OUTPATIENT
Start: 2018-12-28 | End: 2018-12-28 | Stop reason: HOSPADM

## 2018-12-28 RX ORDER — FENTANYL CITRATE 50 UG/ML
25-50 INJECTION, SOLUTION INTRAMUSCULAR; INTRAVENOUS EVERY 5 MIN PRN
Status: DISCONTINUED | OUTPATIENT
Start: 2018-12-28 | End: 2018-12-28 | Stop reason: HOSPADM

## 2018-12-28 RX ORDER — NALOXONE HYDROCHLORIDE 0.4 MG/ML
.1-.4 INJECTION, SOLUTION INTRAMUSCULAR; INTRAVENOUS; SUBCUTANEOUS
Status: DISCONTINUED | OUTPATIENT
Start: 2018-12-28 | End: 2018-12-28 | Stop reason: HOSPADM

## 2018-12-28 RX ORDER — ONDANSETRON 2 MG/ML
4 INJECTION INTRAMUSCULAR; INTRAVENOUS EVERY 30 MIN PRN
Status: DISCONTINUED | OUTPATIENT
Start: 2018-12-28 | End: 2018-12-28 | Stop reason: HOSPADM

## 2018-12-28 RX ORDER — ALBUTEROL SULFATE 0.83 MG/ML
2.5 SOLUTION RESPIRATORY (INHALATION) EVERY 4 HOURS PRN
Status: DISCONTINUED | OUTPATIENT
Start: 2018-12-28 | End: 2018-12-28 | Stop reason: HOSPADM

## 2018-12-28 RX ORDER — SODIUM CHLORIDE, SODIUM LACTATE, POTASSIUM CHLORIDE, CALCIUM CHLORIDE 600; 310; 30; 20 MG/100ML; MG/100ML; MG/100ML; MG/100ML
INJECTION, SOLUTION INTRAVENOUS CONTINUOUS PRN
Status: DISCONTINUED | OUTPATIENT
Start: 2018-12-28 | End: 2018-12-28

## 2018-12-28 RX ORDER — FENTANYL CITRATE 50 UG/ML
INJECTION, SOLUTION INTRAMUSCULAR; INTRAVENOUS PRN
Status: DISCONTINUED | OUTPATIENT
Start: 2018-12-28 | End: 2018-12-28

## 2018-12-28 RX ORDER — NALOXONE HYDROCHLORIDE 0.4 MG/ML
.1-.4 INJECTION, SOLUTION INTRAMUSCULAR; INTRAVENOUS; SUBCUTANEOUS
Status: DISCONTINUED | OUTPATIENT
Start: 2018-12-28 | End: 2018-12-29

## 2018-12-28 RX ORDER — NALOXONE HYDROCHLORIDE 0.4 MG/ML
.1-.4 INJECTION, SOLUTION INTRAMUSCULAR; INTRAVENOUS; SUBCUTANEOUS
Status: DISCONTINUED | OUTPATIENT
Start: 2018-12-28 | End: 2018-12-28

## 2018-12-28 RX ORDER — LIDOCAINE HYDROCHLORIDE 20 MG/ML
INJECTION, SOLUTION INFILTRATION; PERINEURAL PRN
Status: DISCONTINUED | OUTPATIENT
Start: 2018-12-28 | End: 2018-12-28

## 2018-12-28 RX ORDER — PROPOFOL 10 MG/ML
INJECTION, EMULSION INTRAVENOUS PRN
Status: DISCONTINUED | OUTPATIENT
Start: 2018-12-28 | End: 2018-12-28

## 2018-12-28 RX ORDER — FENTANYL CITRATE 50 UG/ML
25-50 INJECTION, SOLUTION INTRAMUSCULAR; INTRAVENOUS
Status: CANCELLED | OUTPATIENT
Start: 2018-12-28

## 2018-12-28 RX ORDER — SODIUM CHLORIDE, SODIUM LACTATE, POTASSIUM CHLORIDE, CALCIUM CHLORIDE 600; 310; 30; 20 MG/100ML; MG/100ML; MG/100ML; MG/100ML
INJECTION, SOLUTION INTRAVENOUS CONTINUOUS
Status: DISCONTINUED | OUTPATIENT
Start: 2018-12-28 | End: 2018-12-28 | Stop reason: HOSPADM

## 2018-12-28 RX ORDER — DEXAMETHASONE SODIUM PHOSPHATE 4 MG/ML
INJECTION, SOLUTION INTRA-ARTICULAR; INTRALESIONAL; INTRAMUSCULAR; INTRAVENOUS; SOFT TISSUE PRN
Status: DISCONTINUED | OUTPATIENT
Start: 2018-12-28 | End: 2018-12-28

## 2018-12-28 RX ORDER — INDOMETHACIN 50 MG/1
SUPPOSITORY RECTAL PRN
Status: DISCONTINUED | OUTPATIENT
Start: 2018-12-28 | End: 2018-12-28 | Stop reason: HOSPADM

## 2018-12-28 RX ORDER — INDOMETHACIN 50 MG/1
100 SUPPOSITORY RECTAL
Status: CANCELLED | OUTPATIENT
Start: 2018-12-28

## 2018-12-28 RX ORDER — HYDROMORPHONE HYDROCHLORIDE 1 MG/ML
.3-.5 INJECTION, SOLUTION INTRAMUSCULAR; INTRAVENOUS; SUBCUTANEOUS EVERY 10 MIN PRN
Status: DISCONTINUED | OUTPATIENT
Start: 2018-12-28 | End: 2018-12-28 | Stop reason: HOSPADM

## 2018-12-28 RX ORDER — FLUMAZENIL 0.1 MG/ML
0.2 INJECTION, SOLUTION INTRAVENOUS
Status: DISCONTINUED | OUTPATIENT
Start: 2018-12-28 | End: 2018-12-29

## 2018-12-28 RX ORDER — MULTIPLE VITAMINS W/ MINERALS TAB 9MG-400MCG
1 TAB ORAL DAILY
Status: DISCONTINUED | OUTPATIENT
Start: 2018-12-28 | End: 2019-01-07 | Stop reason: HOSPADM

## 2018-12-28 RX ORDER — LIDOCAINE 40 MG/G
CREAM TOPICAL
Status: CANCELLED | OUTPATIENT
Start: 2018-12-28

## 2018-12-28 RX ORDER — ONDANSETRON 4 MG/1
4 TABLET, ORALLY DISINTEGRATING ORAL EVERY 30 MIN PRN
Status: DISCONTINUED | OUTPATIENT
Start: 2018-12-28 | End: 2018-12-28 | Stop reason: HOSPADM

## 2018-12-28 RX ORDER — IOPAMIDOL 510 MG/ML
INJECTION, SOLUTION INTRAVASCULAR PRN
Status: DISCONTINUED | OUTPATIENT
Start: 2018-12-28 | End: 2018-12-28 | Stop reason: HOSPADM

## 2018-12-28 RX ORDER — FENTANYL CITRATE 50 UG/ML
25-50 INJECTION, SOLUTION INTRAMUSCULAR; INTRAVENOUS
Status: DISCONTINUED | OUTPATIENT
Start: 2018-12-28 | End: 2018-12-28 | Stop reason: HOSPADM

## 2018-12-28 RX ORDER — PIPERACILLIN SODIUM, TAZOBACTAM SODIUM 3; .375 G/15ML; G/15ML
3.38 INJECTION, POWDER, LYOPHILIZED, FOR SOLUTION INTRAVENOUS EVERY 6 HOURS
Status: DISCONTINUED | OUTPATIENT
Start: 2018-12-28 | End: 2018-12-31

## 2018-12-28 RX ORDER — LABETALOL HYDROCHLORIDE 5 MG/ML
5 INJECTION, SOLUTION INTRAVENOUS EVERY 10 MIN PRN
Status: DISCONTINUED | OUTPATIENT
Start: 2018-12-28 | End: 2018-12-28 | Stop reason: HOSPADM

## 2018-12-28 RX ORDER — ONDANSETRON 2 MG/ML
INJECTION INTRAMUSCULAR; INTRAVENOUS PRN
Status: DISCONTINUED | OUTPATIENT
Start: 2018-12-28 | End: 2018-12-28

## 2018-12-28 RX ADMIN — PIPERACILLIN SODIUM AND TAZOBACTAM SODIUM 3.38 G: 3; .375 INJECTION, POWDER, LYOPHILIZED, FOR SOLUTION INTRAVENOUS at 14:35

## 2018-12-28 RX ADMIN — METOPROLOL TARTRATE 75 MG: 50 TABLET ORAL at 08:12

## 2018-12-28 RX ADMIN — PROPOFOL 30 MG: 10 INJECTION, EMULSION INTRAVENOUS at 14:23

## 2018-12-28 RX ADMIN — PHENYLEPHRINE HYDROCHLORIDE 100 MCG: 10 INJECTION, SOLUTION INTRAMUSCULAR; INTRAVENOUS; SUBCUTANEOUS at 15:06

## 2018-12-28 RX ADMIN — FUROSEMIDE 20 MG: 10 INJECTION, SOLUTION INTRAVENOUS at 08:10

## 2018-12-28 RX ADMIN — ALBUTEROL SULFATE 2.5 MG: 2.5 SOLUTION RESPIRATORY (INHALATION) at 15:40

## 2018-12-28 RX ADMIN — TACROLIMUS 5 MG: 5 CAPSULE ORAL at 17:44

## 2018-12-28 RX ADMIN — SENNOSIDES AND DOCUSATE SODIUM 2 TABLET: 8.6; 5 TABLET ORAL at 08:12

## 2018-12-28 RX ADMIN — MYCOPHENOLATE MOFETIL 1000 MG: 250 CAPSULE ORAL at 17:44

## 2018-12-28 RX ADMIN — DEXAMETHASONE SODIUM PHOSPHATE 6 MG: 4 INJECTION, SOLUTION INTRA-ARTICULAR; INTRALESIONAL; INTRAMUSCULAR; INTRAVENOUS; SOFT TISSUE at 14:33

## 2018-12-28 RX ADMIN — SULFAMETHOXAZOLE AND TRIMETHOPRIM 1 TABLET: 400; 80 TABLET ORAL at 08:13

## 2018-12-28 RX ADMIN — SODIUM CHLORIDE, POTASSIUM CHLORIDE, SODIUM LACTATE AND CALCIUM CHLORIDE: 600; 310; 30; 20 INJECTION, SOLUTION INTRAVENOUS at 14:13

## 2018-12-28 RX ADMIN — PHENYLEPHRINE HYDROCHLORIDE 100 MCG: 10 INJECTION, SOLUTION INTRAMUSCULAR; INTRAVENOUS; SUBCUTANEOUS at 14:44

## 2018-12-28 RX ADMIN — ALFUZOSIN HYDROCHLORIDE 10 MG: 10 TABLET, EXTENDED RELEASE ORAL at 08:12

## 2018-12-28 RX ADMIN — AMLODIPINE BESYLATE 10 MG: 10 TABLET ORAL at 08:13

## 2018-12-28 RX ADMIN — SENNOSIDES AND DOCUSATE SODIUM 2 TABLET: 8.6; 5 TABLET ORAL at 20:12

## 2018-12-28 RX ADMIN — PHENYLEPHRINE HYDROCHLORIDE 100 MCG: 10 INJECTION, SOLUTION INTRAMUSCULAR; INTRAVENOUS; SUBCUTANEOUS at 14:39

## 2018-12-28 RX ADMIN — TACROLIMUS 5 MG: 5 CAPSULE ORAL at 08:12

## 2018-12-28 RX ADMIN — FLUCONAZOLE 200 MG: 2 INJECTION, SOLUTION INTRAVENOUS at 21:34

## 2018-12-28 RX ADMIN — OXYCODONE HYDROCHLORIDE 5 MG: 5 TABLET ORAL at 11:57

## 2018-12-28 RX ADMIN — LIDOCAINE HYDROCHLORIDE 100 MG: 20 INJECTION, SOLUTION INFILTRATION; PERINEURAL at 14:19

## 2018-12-28 RX ADMIN — FUROSEMIDE 20 MG: 10 INJECTION, SOLUTION INTRAVENOUS at 20:12

## 2018-12-28 RX ADMIN — METOPROLOL TARTRATE 75 MG: 50 TABLET ORAL at 20:12

## 2018-12-28 RX ADMIN — SODIUM CHLORIDE, POTASSIUM CHLORIDE, SODIUM LACTATE AND CALCIUM CHLORIDE 500 ML: 600; 310; 30; 20 INJECTION, SOLUTION INTRAVENOUS at 16:41

## 2018-12-28 RX ADMIN — PHENYLEPHRINE HYDROCHLORIDE 100 MCG: 10 INJECTION, SOLUTION INTRAMUSCULAR; INTRAVENOUS; SUBCUTANEOUS at 14:49

## 2018-12-28 RX ADMIN — VALGANCICLOVIR 450 MG: 450 TABLET, FILM COATED ORAL at 08:12

## 2018-12-28 RX ADMIN — MYCOPHENOLATE MOFETIL 1000 MG: 250 CAPSULE ORAL at 08:11

## 2018-12-28 RX ADMIN — FENTANYL CITRATE 100 MCG: 50 INJECTION, SOLUTION INTRAMUSCULAR; INTRAVENOUS at 14:18

## 2018-12-28 RX ADMIN — PROPOFOL 90 MG: 10 INJECTION, EMULSION INTRAVENOUS at 14:21

## 2018-12-28 RX ADMIN — SUGAMMADEX 200 MG: 100 INJECTION, SOLUTION INTRAVENOUS at 15:17

## 2018-12-28 RX ADMIN — ONDANSETRON 4 MG: 2 INJECTION INTRAMUSCULAR; INTRAVENOUS at 15:14

## 2018-12-28 RX ADMIN — PREDNISONE 5 MG: 5 TABLET ORAL at 08:13

## 2018-12-28 RX ADMIN — PIPERACILLIN SODIUM AND TAZOBACTAM SODIUM 3.38 G: 3; .375 INJECTION, POWDER, LYOPHILIZED, FOR SOLUTION INTRAVENOUS at 20:12

## 2018-12-28 RX ADMIN — ROCURONIUM BROMIDE 50 MG: 10 INJECTION INTRAVENOUS at 14:21

## 2018-12-28 RX ADMIN — OXYCODONE HYDROCHLORIDE 5 MG: 5 TABLET ORAL at 08:11

## 2018-12-28 ASSESSMENT — ACTIVITIES OF DAILY LIVING (ADL)
ADLS_ACUITY_SCORE: 13

## 2018-12-28 ASSESSMENT — MIFFLIN-ST. JEOR: SCORE: 1649.46

## 2018-12-28 NOTE — PROGRESS NOTES
PACU report called in to Radha Soliman on unit 7A. All questions and concerns addressed. Patient remains hemodunamically stable. Ongoing monitoring for patient.    Michelle Xiong RN on 12/28/2018 at 5:05 PM

## 2018-12-28 NOTE — PROGRESS NOTES
Transplant Surgery  Inpatient Daily Progress Note  12/28/2018    Assessment & Plan: 64 yo M hx Laennec's cirrhosis with HCC. S/p DBD OLT with right inguinal hernia repair with biologic mesh 12/22/18. Notable for small arterial anastomosis (donor common hepatic with atherosclerosis to recipient replaced right hepatic).    Graft function: POD #6. AP and ALT increased, remainder of LFTs down. 12/26 US: small fluid collection, otherwise normal. MRCP concern for biliary anastomotic narrowing. Will have ERCP today, ~ 1300.  Immunosuppression management: Induction with steroid pulse.  Methylpred:  Taper per protocol, continue pred 5 until tac level at goal  MMF: 1000mg BID  Tacro: Goal level 10-15. Level 4, increased yesterday. Anticipate increase in level in 1-2 weeks after rifampin is fully metabolized (last dose 12/20). Tac level every 48 hours. Add on level today.   Complexity of management: High. Contributing factors: induction  Hematology:   Anemia: Hgb stable  Vascular prophylaxis: Due to small HA anastomosis. Received dextran X48 hrs, on aspirin 325.   Cardiorespiratory:   Post operative atrial fibrillation with RVR: On 12/25. Started on scheduled metoprolol and amio bolus/drip. Trops low/negative. 12/25 Echo: severely dilated left atrium, mild to moderate LVH, preserved EF. Converted back to NSR 12/25 PM. Run of aberrant AF vs VT (14 beats) yesterday. Cardiology consulted. Recommend continue rate control with metoprolol 75 mg BID, no indication for anticoagulation. NSVT - 2 short runs documented on tele. Cards recommended no further cardiac testing or management.   Hypoxia/dyspnea/reactive airways: Mild hypoxia and wheezing, improved. Continue PRN nebs and diuresis.  HTN: Amlodipine 10mg and metoprolol to 75mg oral BID.  GI/Nutrition: NPO for ERCP  Constipation: Large gastric bubble on CXR yesterday. Continue senna, miralax. Held MOM due to high Mag level. Supp today.   Endocrine:   Hyperglycemia: A1c 5.8% on  admission. Steroid induced hyperglycemia. Novolog SSI.  Fluid/Electrolytes:   Hypervolemia: Weight up 7kg. No IVF. Continue lasix IV 20mg BID. Monitor I&O and weight daily.  :   Hematuria: Developed 12/26 with manipulation of cota. Has cota. Mild hematuria.  Bladder spasms: No c/o today. B&O supp ordered.  Penile edema: Cota in place. Diurese today.   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  Prophylaxis: DVT, fall, GI, continue diflucan given herniorraphy with mesh. Stop Vanco.  Disposition: 7A    Medical Decision Making: Medium    IRVIN/Fellow/Resident Provider: CARMENZA Wei    Faculty: Shaila De La Cruz MD    __________________________________________________________________  Transplant History: Admitted 12/21/2018 for DDLT and RIH repair (XenMatrix mesh).   The patient has a history of liver failure due to Laennec's cirrhosis with HCC.    12/22/2018 (Liver), Postoperative day: 6     Interval History: No drainage from incision today. Abdominal discomfort improved with passing flatus. +Flatus. BM x 1 yesterday, no BM yet today. Denies cough, some SOB. BLE and scrotal edema making ambulation difficult.   ROS:   A 10-point review of systems was negative except as noted above.    Curent Meds:    alfuzosin ER  10 mg Oral Daily with breakfast     amLODIPine  10 mg Oral Daily     aspirin  325 mg Oral Daily     bisacodyl  10 mg Rectal Once     fluconazole  200 mg Intravenous Q24H     furosemide  20 mg Intravenous BID     insulin aspart  1-10 Units Subcutaneous TID AC     insulin aspart  1-7 Units Subcutaneous At Bedtime     insulin aspart   Subcutaneous TID AC     metoprolol tartrate  75 mg Oral BID     mycophenolate  1,000 mg Oral BID IS     omeprazole  20 mg Oral QAM AC     polyethylene glycol  17 g Oral Daily     predniSONE  5 mg Oral Daily     senna-docusate  2 tablet Oral or Feeding Tube BID     sodium chloride (PF)  3 mL  "Intracatheter Q8H     sodium chloride (PF)  3 mL Intravenous Q8H     sulfamethoxazole-trimethoprim  1 tablet Oral or Feeding Tube Daily     tacrolimus  5 mg Oral BID IS     valGANciclovir  450 mg Oral Daily       Physical Exam:     Admit Weight: 84.6 kg (186 lb 9.6 oz)    Current Vitals:   /64 (BP Location: Left arm)   Pulse 63   Temp 97.8  F (36.6  C) (Oral)   Resp 16   Ht 1.702 m (5' 7\")   Wt 90.6 kg (199 lb 11.2 oz)   SpO2 96%   BMI 31.28 kg/m      Vital sign ranges:    Temp:  [97.8  F (36.6  C)-98.4  F (36.9  C)] 97.8  F (36.6  C)  Pulse:  [63] 63  Heart Rate:  [58-68] 64  Resp:  [14-20] 16  BP: (118-137)/(58-73) 131/64  SpO2:  [92 %-98 %] 96 %  Patient Vitals for the past 24 hrs:   BP Temp Temp src Pulse Heart Rate Resp SpO2 Weight   12/28/18 0800 -- -- -- -- -- -- -- 90.6 kg (199 lb 11.2 oz)   12/28/18 0737 131/64 97.8  F (36.6  C) Oral -- 64 16 96 % --   12/28/18 0411 137/73 98.3  F (36.8  C) Oral -- 68 18 93 % --   12/27/18 2333 126/64 98.1  F (36.7  C) Oral -- 61 20 95 % --   12/27/18 2042 137/61 98.2  F (36.8  C) Oral 63 -- 18 95 % --   12/27/18 1625 118/58 97.9  F (36.6  C) Oral -- 58 18 96 % --   12/27/18 1601 -- -- -- -- -- 14 95 % --   12/27/18 1229 -- -- -- -- -- 16 92 % --   12/27/18 1207 125/61 98.4  F (36.9  C) Oral -- 68 18 98 % --     General Appearance: in no apparent distress.   Skin: normal, warm, dry, No rashes, induration, or jaundice  Heart: RRR  Lungs: B/l diminished, without wheezes  Abdomen: abd soft, slightly distended. Incision c/d/i, staples in place. KARINE X2- serosang.   : The genitalia are edematous   Extremities: edema: present bilaterally. 2+. There is no skin breakdown.  Neurologic: awake, alert and oriented x4. Tremor absent.  PIV only      Data:   CMP  Recent Labs   Lab 12/28/18  0556 12/27/18  0512  12/23/18  0404  12/22/18 2010 12/22/18  0013    136   < > 144   < > 139   < > 139   POTASSIUM 3.8 3.8   < > 3.6   < > 3.5   < > 4.5   CHLORIDE 102 102   < > " 112*   < >  --   --  109   CO2 25 30   < > 24   < >  --   --  26   * 153*   < > 107*   < > 172*   < > 167*   BUN 24 21   < > 18   < >  --   --  10   CR 0.80 0.70   < > 0.66   < >  --   --  0.50*   GFRESTIMATED >90 >90   < > >90   < >  --   --  >90   GFRESTBLACK >90 >90   < > >90   < >  --   --  >90   YELENA 7.6* 7.4*   < > 6.9*   < >  --   --  7.7*   ICAW  --   --   --  4.5  --  4.8   < >  --    MAG 2.3 2.4*   < > 1.7  --   --   --  2.0   PHOS 2.8 3.0   < > 4.4  --   --   --  2.8   AMYLASE  --   --   --  46  --   --   --  67   LIPASE  --   --   --  93  --   --   --   --    ALBUMIN 1.7* 1.7*   < > 1.6*   < >  --   --  2.5*   BILITOTAL 1.2 1.4*   < > 1.7*   < >  --   --  1.7*   ALKPHOS 840* 769*   < > 166*   < >  --   --  408*   AST 93* 97*   < > 773*   < >  --   --  48*   * 228*   < > 602*   < >  --   --  40    < > = values in this interval not displayed.     CBC  Recent Labs   Lab 12/28/18  0556 12/27/18  0512  12/22/18 2121   HGB 8.0* 7.8*   < > 9.3*   WBC 5.6 6.4   < > 8.0   PLT 95* 80*   < > 66*   A1C  --   --   --  5.8*    < > = values in this interval not displayed.     COAGS  Recent Labs   Lab 12/23/18  1204 12/23/18  0404 12/22/18 2121 12/22/18  0013   INR 1.36* 1.61* 2.04* 1.77*   PTT  --   --  40* 38*      Urinalysis  Recent Labs   Lab Test 12/25/18  0732 12/22/18  0034  07/19/18  1133   COLOR Yellow Yellow   < > Yellow   APPEARANCE Slightly Cloudy Clear   < > Clear   URINEGLC Negative Negative   < > Negative   URINEBILI Small* Negative   < > Negative   URINEKETONE Negative Negative   < > Negative   SG 1.021 1.012   < > 1.009   UBLD Moderate* Trace*   < > Negative   URINEPH 6.0 6.5   < > 8.0*   PROTEIN 30* Negative   < > Negative   NITRITE Negative Negative   < > Negative   LEUKEST Negative Negative   < > Negative   RBCU 7* 3*   < >  --    WBCU 1 1   < >  --    UTPG  --   --   --  Unable to calculate due to low value    < > = values in this interval not displayed.     Virology:  Hepatitis C  Antibody   Date Value Ref Range Status   12/22/2018 Nonreactive NR^Nonreactive Final     Comment:     Assay performance characteristics have not been established for newborns,   infants, and children

## 2018-12-28 NOTE — PROVIDER NOTIFICATION
"Time of notification: 7:38 PM  Provider notified: Lala Del Rosario MD  Patient status: pt scrotum is increasing in size, pt complaining his scrotum is heavy . Denied pain and cota is patent  producing good UOP,. Ice pack and elevating scrotum \" helps a little.\"    Temp:  [97.9  F (36.6  C)-98.9  F (37.2  C)] 97.9  F (36.6  C)  Heart Rate:  [58-70] 58  Resp:  [14-18] 18  BP: (118-152)/(58-79) 118/58  SpO2:  [92 %-100 %] 96 %  Orders received: none at this time . Will continue to monitor.   "

## 2018-12-28 NOTE — PROGRESS NOTES
Transplant Social Work Services Progress Note      Date of Initial Social Work Evaluation: 12/24/2018  Collaborated with: Pts daughter Kaylene (over the phone)- pt in procedure.    Data: SW received consult to discuss finances with pt. Per pts daughter they are concerns about pt being able to pay his rent and [place] [employer] insurance premiums while he is out of work. He is still getting social security and his wife is still working but with pt out of work currently finances are tight. Asked about  funds to assist. This SW informed her there are limited  funds available and is a case by case assessment of need. Will discuss with pts outpatient SW and look into what  funds can assist with. Informed her if these are used they are one time.  Briefly discussed rehab and answered Kaylene's questions.  Intervention: Spoke with pts daughter regarding financial concerns.  Assessment: Pt having financial concerns related to transplant and paying rent/insurance premiums.  Education provided by SW:  funds, rehab recommendations.  Plan:    Discharge Plans in Progress: [facility] following.    Barriers to d/c plan: Medical stability.    Follow up Plan: SW will continue to follow.    ABRAHAM Soriano, LIZZIESW  7A   Ph: [phone], Pager: [phone]

## 2018-12-28 NOTE — PLAN OF CARE
Neuro: A&Ox4.   Cardiac: SR. VSS.   Respiratory: Sating 98% on RA.  GI/: cota patent with adequate urine output. Had loose stools X2.   Diet/appetite: Tolerating regular diet. Eating well.  Activity:  Assist of one up to chair and ambulating around his room and to the bathroom.   Pain: Denied pain or discomfort.   Skin: staples intact with no drainage.   LDA's: cota , Tung intact and functioning well.   Plan: report given to receiving RN , pt and wife verbalized understanding of the need for transfer. All belongings sent with pt. Continue with POC. Notify primary team with changes.

## 2018-12-28 NOTE — OP NOTE
ERCP 12/28/2018  2:13 PM [facility], operated by [facility], [address] [place] [address] [phone]     Endoscopy Department   _______________________________________________________________________________   Patient Name: Loy Limon         Procedure Date: 12/28/2018 2:13 PM   MRN: [id number]                       Account Number: [id number]   YOB: 1955              Admit Type: Inpatient   Age: 63                               Room: OR   Gender: Male                          Note Status: Finalized   Attending MD: Omero Lawler MD   Total Sedation Time:   _______________________________________________________________________________       Procedure:           ERCP   Indications:         Abnormal MRCP, Elevated liver enzymes, Post liver                        transplant assessment, Bile duct stricture   Providers:           Omero Lawler MD   Patient Profile:     Mr Limon is a 64yo gentleman with a history of cirrhosis                        complicated by HCC now status post DDLT 6 days back.                        With rising liver studies an MRCP was obtained                        demonstrating an anastomotic stenosis. He proceeds to                        ERCP for management.   Referring MD:        Shaila De La Cruz MD   Medicines:           General Anesthesia, Indomethacin 100 mg [place], Antibiotics                        as scheduled   Complications:       No immediate complications.   _______________________________________________________________________________   Procedure:           Pre-Anesthesia Assessment:                        - Prior to the procedure, a History and Physical was                        performed, and patient medications and allergies were                        reviewed. The patient is competent. The risks and                        benefits of the procedure and the sedation options and                        risks were discussed with the  patient. All questions                        were answered and informed consent was obtained. Patient                        identification and proposed procedure were verified by                        the nurse in the pre-procedure area. Mental Status                        Examination: alert and oriented. Airway Examination:                        Mallampati Class II (the uvula but not tonsillar pillars                        visualized). Respiratory Examination: clear to                        auscultation. CV Examination: systolic murmur. ASA Grade                        Assessment: III - A patient with severe systemic                        disease. After reviewing the risks and benefits, the                        patient was deemed in satisfactory condition to undergo                        the procedure. The anesthesia plan was to use general                        anesthesia. Immediately prior to administration of                        medications, the patient was re-assessed for adequacy to                        receive sedatives. The heart rate, respiratory rate,                        oxygen saturations, blood pressure, adequacy of                        pulmonary ventilation, and response to care were                        monitored throughout the procedure. The physical status                        of the patient was re-assessed after the procedure.                        After obtaining informed consent, the scope was passed                        under direct vision. Throughout the procedure, the                        patient's blood pressure, pulse, and oxygen saturations                        were monitored continuously. The duodenoscope was                        introduced through the mouth, and used to inject                        contrast into and used to inject contrast into the bile                        duct and ventral pancreatic duct. The ERCP was                        " accomplished without difficulty. The patient tolerated                        the procedure well.                                                                                     Findings:        A  film demonstrated numerous surgical staples. Limited white light        views of the foregut were notable for a periampullary diverticulum. This        led to initial superficial cannulation of the ventral duct with        opacification.I personally interpreted the pancreatic duct images, and        later the biliary images. Ductal flow of contrast was adequate, image        quality was excellent and contrast extended to the pancreatic duct which        appeared grossly unremarkable. With this anatomy delineated the bile        duct was deeply cannulated with the short-nosed traction sphincterotome        in concert with an 0.025\" Visiglide wire. Contrast was injected. The        donor intrahepatics were decompressed and overall healthy appearing. The        donor bifurcaiton and common were also decompressed. There was a short        anastomotic stenosis and slight mismatch though the recipient common was        not overtly dilated. The ampulla was tight perhaps related to the        diverticulum. A 5 mm biliary sphincterotomy was made with a monofilament        traction (standard) sphincterotome using ERBE electrocautery. There was        no post-sphincterotomy bleeding. A 10 Fr by 9 cm SF stent with a single        external flap and a single internal flap was placed 9 cm into the common        bile duct. Bile flowed through the stent and the stent was in good        position. A decision was made not to pursue the pancreatic duct as        drianage was complete and there was minima wire cannulation of this duct.                                                                                     Impression:          - Periampullary diverticulum                        - Benign appearing ampullary and anastomotic " stenosis                        managed by biliary sphincterotomy and biliary stent                        placement (dilation avoided)   Recommendation:      - Standard inpatient general anesthesia recovery though                        an second litter of lactated ringers is recommended on                        return to the floor                        - Avoid antithrombotics for three days if feasible                        - Follow liver studies expectantly                        - Repeat ERCP in 8 weeks for stenosis reinterrogation                        - The findings and recommendations were discussed with                        the patient and their family

## 2018-12-28 NOTE — ANESTHESIA POSTPROCEDURE EVALUATION
Anesthesia POST Procedure Evaluation    Patient: Loy Limon   MRN:     [id number] Gender:   male   Age:    63 year old :      1955        Preoperative Diagnosis: Biliary Obstruction   Procedure(s):  ENDOSCOPIC RETROGRADE CHOLANGIOPANCREATOGRAM, with Billary Sphincterotomy and Billary Stent Placement   Postop Comments: No value filed.       Anesthesia Type:  General    Reportable Event: NO     PAIN: Uncomplicated   Sign Out status: Comfortable, Well controlled pain     PONV: No PONV   Sign Out status:  No Nausea or Vomiting     Neuro/Psych: Uneventful perioperative course   Sign Out Status: Preoperative baseline; Age appropriate mentation     Airway/Resp.: Uneventful perioperative course   Sign Out Status: Non labored breathing, age appropriate RR; Resp. Status within EXPECTED Parameters     CV: Uneventful perioperative course   Sign Out status: Appropriate BP and perfusion indices; Appropriate HR/Rhythm     Disposition:   Sign Out in:  PACU  Disposition:  Phase II; Home  Recovery Course: Uneventful  Follow-Up: Not required           Last Anesthesia Record Vitals:  CRNA VITALS  2018 1453 - 2018 1553      2018             Resp Rate (observed):  18          Last PACU/Preop Vitals:  Vitals:    18 1600 18 1615 18 1630   BP:      Pulse:      Resp: 16 13 14   Temp: 36.6  C (97.8  F)     SpO2:            Electronically Signed By: Deepti Roy MD, 2018, 4:42 PM

## 2018-12-28 NOTE — PROGRESS NOTES
CLINICAL NUTRITION SERVICES - REASSESSMENT NOTE     Nutrition Prescription    RECOMMENDATIONS FOR MDs/PROVIDERS TO ORDER:  1. Recommend rechecking Vitamin D deficiency screen given very low (6) 2018.     2. Recommend placement of FT or maintain a consistent diet order (Regular) given patient with poor nutritional intake throughout LOS and increased needs s/p txp.      Malnutrition Status:    Non-severe malnutrition in the context of acute illness.    Recommendations already ordered by Registered Dietitian (RD):  Modified calorie counts   Thera-Vit-M     Future/Additional Recommendations:  1. Monitor calorie counts and determine need for further ONS and/or scheduled snacks.  2. Monitor ability to provide s/p transplant diet education.   3. Calorie counts to consistently meet approximately 60% of higher end of estimated needs (1500 kcals and 82 g PRO).      EVALUATION OF THE PROGRESS TOWARD GOALS   Diet: NPO (x24 hr) for procedure    Prior Diet: NPO (-). Clear Liquids (-). Regular (). NPO, Full Liquids, Regular ().  Intake: % of 1-2 meals/day per RN flowsheet, also see calorie count below.      Calorie Count (): 1007 kcal (14 kcal/kg/day) 50 g PRO (0.7 g/kg/day)     This meets 47% of estimated energy and protein needs.      Information obtained from patient (minimal):  - Patient expressed having a fair-good appetite at this time.       NEW FINDINGS   Weight: up 6 kg since admit, suspect fluid related, at least in part.     Labs: M.3 and Phos: 2.8 (WNL)     GI: Last stool (x1) on .     MALNUTRITION  % Intake: < 75% for > 7 days (non-severe)  % Weight Loss: None noted  Subcutaneous Fat Loss: Facial region and Thoracic/intercostal: Mild   Muscle Loss: Temporal, Facial & jaw region, Scapular bone, Thoracic region (clavicle, acromium bone, deltoid, trapezius, pectoral), Upper arm (bicep, tricep), Lower arm  (forearm), Dorsal hand, Upper leg (quadricep, hamstring),  Patellar region and Posterior calf: Mild-moderate (age vs nutrition related)   Fluid Accumulation/Edema: Mild  Malnutrition Diagnosis: Non-severe malnutrition in the context of acute illness.    Previous Goals   Diet adv v nutrition support within 2-3 days.  Evaluation: Partially met     Previous Nutrition Diagnosis  Predicted inadequate protein-energy intake related to anticipate prolonged intubation and EN support through post-transplant course as evidenced by currently NPO with unclear plan for diet adv and or/nutrition support at this time.  Evaluation: No longer applicable, nutrition diagnosis changed below    CURRENT NUTRITION DIAGNOSIS  Inadequate protein-energy intake related to increased needs s/p liver txp as evidenced by inconsistent diet status & 1-day calorie count providing 1007 kcal (14 kcal/kg/day) and 50 g PRO (0.7 g/kg/day), which meets 47% of estimated nutritional needs.          INTERVENTIONS  Implementation  Multivitamin/mineral supplement therapy (see above)     Goals  Diet adv v nutrition support within 2-3 days.    Monitoring/Evaluation  Progress toward goals will be monitored and evaluated per protocol.      Audelia Salomon RD, LD  7A/6D weekday pgr. [phone]

## 2018-12-28 NOTE — CONSULTS
Cardiology Consult                                                               2018  Loy Limon MRN: [id number]  Age: 63 year old, : 1955        Reason for consult:      Post-op AF, NSVT        Assessment and Recommendation:     62 yo with past medical history significant for liver cirrhosis and HCC now POD5 after liver transplant who developed post-operative atrial fibrillation which was managed with metoprolol and IV amiodarone. His rates have been well controlled and he has since converted back to sinus rhythm. He was noted to have a a 12 beat run of NSVT yesterday and again another 4-5 beat run this am. The NSVT is nothing to worry about, especially given the normal cardiac function on TTE. His AF is well controlled and I agree with increasing the beta blocker. I do not recommend restarting amiodarone. I do not recommend further cardiac workup at this time.    1. Post-operative Atrial Fibrillation  - well rate controlled  - Agree with increasing metoprolol to 75mg BID  - I would not start anticoagulation at this time    2. NSVT  - 2 short runs documented on tele  - No further cardiac testing or management recommended  - Does not need to continue on tele from my perspective      Patient discussed with staff attending, Dr. Plascencia and the note reflects our joint plan. Thank you for consulting the cardiovascular services at the [facility]. Please do not hesitate to call with questions or concerns.     Krishan Costello MD    PGY-4, Cardiology Fellow  Pager: [phone]        History of Present Illness:     Mr. Loy Limon is a 62 yo with past medical history significant for liver cirrhosis and HCC who is now POD5 after liver transplant. His post-surgical course has been complicated by post-operative atrial fibrillation. This was managed with metoprolol and amiodarone. He has since converted to sinus rhythm. Yesterday tele was significant for a 12  beat run of [employer]. Today, Mr. Limon states he feels well. He denies chest pain, SOB, LH/dizziness. No F/C. Pain well controlled    ROS negative accept as mentioned above.      Past Medical History:     Patient Active Problem List   Diagnosis     Cirrhosis of liver (H)     Liver lesion     HCC (hepatocellular carcinoma) (H)     Liver transplant recipient (H)     Immunosuppressed status (H)     Hypervolemia     Atrial fibrillation with rapid ventricular response (H)     Hematuria     Bilateral wheezing     Hypoxia         Past Surgical History:      Past Surgical History:   Procedure Laterality Date     APPENDECTOMY       COLONOSCOPY      2018     HERNIORRHAPHY INGUINAL  2018    Procedure: Herniorrhaphy inguinal;  Surgeon: Emil Echevarria MD;  Location:  OR     TRANSPLANT LIVER RECIPIENT  DONOR N/A 2018    Procedure: TRANSPLANT LIVER RECIPIENT  DONOR;  Surgeon: Emil Echevarria MD;  Location:  OR         Social History:     Social History     Socioeconomic History     Marital status:      Spouse name: Not on file     Number of children: Not on file     Years of education: Not on file     Highest education level: Not on file   Social Needs     Financial resource strain: Not on file     Food insecurity - worry: Not on file     Food insecurity - inability: Not on file     Transportation needs - medical: Not on file     Transportation needs - non-medical: Not on file   Occupational History     Not on file   Tobacco Use     Smoking status: Former Smoker     Packs/day: 0.03     Years: 20.00     Pack years: 0.60     Types: Cigarettes, Cigars     Start date: 1970     Last attempt to quit: 3/14/2018     Years since quittin.7     Smokeless tobacco: Never Used     Tobacco comment: Quit smoking 2018, one cigarette after dinner   Substance and Sexual Activity     Alcohol use: No     Comment: Quit drinking 2018     Drug use: No     Sexual activity: Not on file   Other  Topics Concern     Parent/sibling w/ CABG, MI or angioplasty before 65F 55M? Not Asked   Social History Narrative    Born in [place], came to [place] years ago.     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   Current Outpatient Medications on File Prior to Encounter:  alfuzosin (UROXATRAL) 10 MG 24 hr tablet Take 10 mg by mouth daily   nadolol (CORGARD) 20 MG tablet Take 20 mg by mouth daily   omeprazole (PRILOSEC) 20 MG CR capsule Take 20 mg by mouth daily    rifampin (RIFADIN) 300 MG capsule Take 2 capsules (600 mg) by mouth daily   rifaximin (XIFAXAN) 550 MG TABS tablet Take 1,100 mg by mouth daily   traMADol (ULTRAM) 50 MG tablet Take one po bid prn severe pain           Physical Exam:     B/P: 118/58, T: 97.9, P: 72, R: 18    Wt Readings from Last 4 Encounters:   12/27/18 91.7 kg (202 lb 2.6 oz)   12/20/18 83.1 kg (183 lb 3.2 oz)   12/04/18 83.7 kg (184 lb 8 oz)   11/07/18 81.6 kg (180 lb)         Intake/Output Summary (Last 24 hours) at 12/27/2018 1813  Last data filed at 12/27/2018 1600  Gross per 24 hour   Intake 1318 ml   Output 3195 ml   Net -1877 ml       Gen: AA&Ox3, no acute distress  HEENT:AT/ NC, PERRL b/l, EOM grossly intact, mucous membranes pink, moist without plaque or exudate  PULM/THORAX: Clear to auscultation bilaterally, no rales/rhonchi/wheezes  CV:RRR, S1 and S2 appreciated, no extra heart sounds, murmurs or rub auscultated. No JVD  EXT: No edema, clubbing or cyanosis. No asymmetrical edema or tenderness to palpation in calves bilaterally.  NEURO: CN II-XII intact, strength 5/5 throughout      Data:     Labs Reviewed on Admission    Troponin Lab Results   Component Value Date    TROPI 0.074 (H) 12/25/2018    TROPI 0.113 (H) 12/25/2018      BMP  Recent Labs   Lab 12/27/18  0512 12/26/18  0523 12/25/18  0632 12/25/18  0000    134 136 137   POTASSIUM 3.8 3.8 4.1 3.8   CHLORIDE 102 99 101 103   YELENA 7.4* 7.4* 7.7* 7.2*   CO2 30 27 28 27   BUN 21 25 19 22   CR 0.70 0.75 0.61* 0.72   * 218* 221* 181*     CBC  Recent Labs   Lab 12/27/18  0512 12/26/18  0910 12/24/18  0524 12/23/18  1204   WBC 6.4 10.0 9.3 11.3*   RBC 2.46* 2.58* 2.51* 2.69*   HGB 7.8* 8.2* 8.0* 8.7*   HCT 23.9* 25.0* 24.9* 26.3*   MCV 97 97 99 98   MCH 31.7 31.8 31.9 32.3   MCHC 32.6 32.8 32.1 33.1   RDW 14.2 14.4 15.2* 14.6   PLT 80* 81* 62* 53*     INR  Recent Labs   Lab 12/23/18  1204 12/23/18  0404 12/22/18  2121 12/22/18  0013   INR 1.36* 1.61* 2.04* 1.77*      Hepatic Panel   Lab Results   Component Value Date    AST 97 12/27/2018     Lab Results   Component Value Date     12/27/2018     No results found for: BILICONJ   Lab Results   Component Value Date    BILITOTAL 1.4 12/27/2018     Lab Results   Component Value Date    ALBUMIN 1.7 12/27/2018     Lab Results   Component Value Date    PROTTOTAL 5.5 12/27/2018      Lab Results   Component Value Date    ALKPHOS 769 12/27/2018           Most Recent Imaging:     EKG:      Echo: 12/25/18  Interpretation Summary  Global and regional left ventricular function is normal with an EF of 60-65%.  Right ventricular function, chamber size, wall motion, and thickness are  normal.  Severe left atrial enlargement is present.  The inferior vena cava is normal.  No pericardial effusion is present.     I very much appreciated the opportunity to see and assess Loy Limon in the hospital with CV Fellow Dr Costello and resident team. Patient is consulted for Post-op AF and nonsustained wide QRS tach (likely VT).    I agree with the note above which summarizes my findings and current recommendations.  Please do not hesitate to contact my office if you have any questions or concerns.      Rolly Plascencia MD  Cardiac Arrhythmia  Service  [facility]  [phone]

## 2018-12-28 NOTE — ANESTHESIA CARE TRANSFER NOTE
Patient: Loy Limon    Procedure(s):  ENDOSCOPIC RETROGRADE CHOLANGIOPANCREATOGRAM, with Billary Sphincterotomy and Billary Stent Placement    Diagnosis: Biliary Obstruction  Diagnosis Additional Information: No value filed.    Anesthesia Type:   No value filed.     Note:  Airway :Face Mask  Patient transferred to:PACU  Comments: To PACU, VSS, pt awake and alert, exchanging well, report to RN, care accepted.Handoff Report: Identifed the Patient, Identified the Reponsible Provider, Reviewed the pertinent medical history, Discussed the surgical course, Reviewed Intra-OP anesthesia mangement and issues during anesthesia, Set expectations for post-procedure period and Allowed opportunity for questions and acknowledgement of understanding      Vitals: (Last set prior to Anesthesia Care Transfer)    CRNA VITALS  12/28/2018 1453 - 12/28/2018 1529      12/28/2018             Pulse:  73    Ht Rate:  69    SpO2:  96 %    Resp Rate (observed):  11                Electronically Signed By: EZRA Loyola CRNA  December 28, 2018  3:29 PM

## 2018-12-28 NOTE — PLAN OF CARE
OT/Edema: Pt with 1+ pitting edema in feet, ankles and legs. Scrotum also very swollen and painful. Skin very tight and dry. After skin cares GCBs placed from MTPs to knee creases and scrotal sling placed. Please remove garment if it gets soiled or if pt c/o LE pain or numbness. Will return 12/29 for skin check.

## 2018-12-28 NOTE — PROGRESS NOTES
Calorie Count    Intake recorded for: 12/27  Total Kcals: 1007 Total Protein: 50g    Kcals from Hospital Food: 1007  Protein: 50g    Kcals from Outside Food (average):0 Protein: 0g    # Meals Recorded: 100% omelet w/ sausage, salazar, cheese & veggies, corn muffin, fruit cup, milk    # Supplements Recorded: 0

## 2018-12-28 NOTE — CONSULTS
"  Advanced Endoscopy/Pancreaticobiliary Consultation      Date of Admission:  12/21/2018  Reason for Admission: Liver transplant  Date of Consult  12/28/2018   Requesting Physician:  Emil Echevarria MD           ASSESSMENT AND RECOMMENDATIONS:   Assessment:  63 year old Norwegian speaking male with a history of etoh cirrhosis s/p DBD OLT 12/22 with post operative elevations in LFT, MRCP obtained showing mild narrowing at the choledochocholedochal anastomosis, may be related to edema in the early postoperative period but will plan for ERCP today for further evaluation per request of liver transplant team.     Recommendations:  ERCP today  Continue NPO status   Hold AC for now  Trend LFT  Analgesia/Antiemetics per primary team  Discussed with primary liver transplant team    Gastroenterology follow up recommendations: TBD    Thank you for involving us in this patient's care. Please do not hesitate to contact the GI service with any questions or concerns.     Pt seen and care plan discussed with Dr. Lawler, GI staff physician.    Joyce Suarez PA-C  Advanced Endoscopy/Pancreaticobiliary [facility]  [facility]  Pager *[phone]  -------------------------------------------------------------------------------------------------------------------       Reason for Consultation:   \"request for ERCP, concern for biliary anastomosis\"           History of Present Illness:   Patient seen and examined at 0845. History is obtained from the patient via Norwegian interpretor.    Loy Limon is a 63 year old Norwegian speaking male with a history of etoh cirrhosis s/p DBD OLT 12/22 with post operative elevations in LFT. Adv endoscopy asked to evaluate for ERCP. Patient mainly reports issues with abdominal fullness, denies pain. Working with therapy for LE edema. Denies fevers or chills, no nausea or vomiting. Since transplant on 12/22 patient has developed rising alk phos 171->346->482->769->840 today. " Tbili around 1.2. No fevers. MRCP obtained showing mild narrowing at the choledochocholedochal anastomosis.                   Past Medical History:   Reviewed and edited as appropriate  Past Medical History:   Diagnosis Date     Cancer (H)     hepatocellualr carcinoma     Cirrhosis of liver (H) 2018     Enlarged prostate      Inguinal hernia      Liver lesion 2018            Past Surgical History:   Reviewed and edited as appropriate   Past Surgical History:   Procedure Laterality Date     APPENDECTOMY       COLONOSCOPY      2018     HERNIORRHAPHY INGUINAL  2018    Procedure: Herniorrhaphy inguinal;  Surgeon: Emil Echevarria MD;  Location: [facility] OR     TRANSPLANT LIVER RECIPIENT  DONOR N/A 2018    Procedure: TRANSPLANT LIVER RECIPIENT  DONOR;  Surgeon: Emil Echevarria MD;  Location: [facility] OR            Allergies:   Reviewed and edited as appropriate   No Known Allergies         Medications:     Current Facility-Administered Medications   Medication     alfuzosin ER (UROXATRAL) 24 hr tablet 10 mg     amLODIPine (NORVASC) tablet 10 mg     aspirin (ASA) EC tablet 325 mg     bisacodyl (DULCOLAX) Suppository 10 mg     bisacodyl (DULCOLAX) Suppository 10 mg     glucose gel 15-30 g    Or     dextrose 50 % injection 25-50 mL    Or     glucagon injection 1 mg     fluconazole (DIFLUCAN) intermittent infusion 200 mg in NaCl     furosemide (LASIX) injection 20 mg     insulin aspart (NovoLOG) inj (RAPID ACTING)     insulin aspart (NovoLOG) inj (RAPID ACTING)     insulin aspart (NovoLOG) inj (RAPID ACTING)     insulin aspart (NovoLOG) inj (RAPID ACTING)     ipratropium - albuterol 0.5 mg/2.5 mg/3 mL (DUONEB) neb solution 3 mL     lactated ringers infusion     lidocaine 1 % 1 mL     metoprolol tartrate (LOPRESSOR) tablet 75 mg     multivitamin w/minerals (THERA-VIT-M) tablet 1 tablet     mycophenolate (GENERIC EQUIVALENT) capsule 1,000 mg     naloxone (NARCAN) injection 0.1-0.4 mg      omeprazole (priLOSEC) CR capsule 20 mg     ondansetron (ZOFRAN) injection 4 mg     oxyCODONE (ROXICODONE) tablet 5-10 mg     piperacillin-tazobactam (ZOSYN) 3.375 g vial to attach to  mL bag     polyethylene glycol (MIRALAX/GLYCOLAX) Packet 17 g     predniSONE (DELTASONE) tablet 5 mg     senna-docusate (SENOKOT-S/PERICOLACE) 8.6-50 MG per tablet 2 tablet     sodium chloride (PF) 0.9% PF flush 3 mL     sodium chloride (PF) 0.9% PF flush 3 mL     sodium chloride (PF) 0.9% PF flush 3 mL     sodium chloride (PF) 0.9% PF flush 3 mL     sodium chloride (PF) 0.9% PF flush 3 mL     sodium chloride (PF) 0.9% PF flush 3 mL     sulfamethoxazole-trimethoprim (BACTRIM/SEPTRA) 400-80 MG per tablet 1 tablet     tacrolimus (GENERIC EQUIVALENT) capsule 5 mg     valGANciclovir (VALCYTE) tablet 450 mg             Review of Systems:   A complete review of systems was performed and is negative except as noted in the HPI            Physical Exam:   Temp: 97.8  F (36.6  C) Temp src: Oral BP: 131/64 Pulse: 63 Heart Rate: 64 Resp: 16 SpO2: 96 % O2 Device: None (Room air) Oxygen Delivery: 1 LPM  Wt:   Wt Readings from Last 2 Encounters:   12/27/18 91.7 kg (202 lb 2.6 oz)   12/20/18 83.1 kg (183 lb 3.2 oz)        General: Chronically ill appearing male in NAD.  Answers appropriately.    HEENT: Head is AT/NC. Sclera anicteric. No conjunctival injection.  Oropharynx is clear, moist and w/o exudate or lesions.  Neck: No masses or thyromegaly.  Lungs: Clear to auscultation bilaterally.  No wheezes, rhonchi or crackles.    Heart: Regular rate and rhythm.  No murmurs, gallops or rubs.  Normal S1 and S2.  Abdomen: Soft, non-tender, non-distended.  BS +.  No hepatosplenomegaly. No rebound or peritoneal signs  Extremities: WWP, ++Bilat pedal edema.  Heme/Lymph: No cervical or supraclavicular adenopathy.   Skin: No jaundice or rash  Neurologic: Grossly non-focal.  CN 2-12 grossly intact.            Data:   Labs and imaging below were  independently reviewed and interpreted    LAB WORK:    BMP  Recent Labs   Lab 12/28/18  0556 12/27/18  0512 12/26/18  0523 12/25/18  0632    136 134 136   POTASSIUM 3.8 3.8 3.8 4.1   CHLORIDE 102 102 99 101   YELENA 7.6* 7.4* 7.4* 7.7*   CO2 25 30 27 28   BUN 24 21 25 19   CR 0.80 0.70 0.75 0.61*   * 153* 218* 221*     CBC  Recent Labs   Lab 12/28/18  0556 12/27/18  0512 12/26/18  0910 12/24/18  0524   WBC 5.6 6.4 10.0 9.3   RBC 2.53* 2.46* 2.58* 2.51*   HGB 8.0* 7.8* 8.2* 8.0*   HCT 24.5* 23.9* 25.0* 24.9*   MCV 97 97 97 99   MCH 31.6 31.7 31.8 31.9   MCHC 32.7 32.6 32.8 32.1   RDW 14.1 14.2 14.4 15.2*   PLT 95* 80* 81* 62*     INR  Recent Labs   Lab 12/23/18  1204 12/23/18  0404 12/22/18  2121 12/22/18  0013   INR 1.36* 1.61* 2.04* 1.77*     LFTs  Recent Labs   Lab 12/28/18  0556 12/27/18  0512 12/26/18  0523 12/25/18  0632   ALKPHOS 840* 769* 482* 346*   AST 93* 97* 82* 179*   * 228* 283* 352*   BILITOTAL 1.2 1.4* 1.7* 1.5*   PROTTOTAL 5.4* 5.5* 6.0* 5.5*   ALBUMIN 1.7* 1.7* 1.9* 1.8*      PANC  Recent Labs   Lab 12/23/18  0404 12/22/18  0013   LIPASE 93  --    AMYLASE 46 67       IMAGING:  MRCP Without and With Contrast     CLINICAL HISTORY: Abn liver function tests (LFTs); POD #5 liver  transplant, elevated alk phos, r/o stricture     DATE: 12/27/2018 3:16 PM     TECHNIQUE:      Images were acquired with and without intravenous gadolinium contrast  through the upper abdomen. The following MR images were acquired  without intravenous contrast: TrueFISP, multiplanar T2-weighted, axial  T1 in/out of phase, T2-weighted MRCP images, axial diffusion-weighted  and axial apparent diffusion coefficient. T1-weighted images were  obtained before contrast at the multiple time points following  contrast injection. 3-D reformatted images were generated by the  technologist. Contrast dose: 8.5 ml Eovist     Comparison study: MR abdomen 10/3/2018     FINDINGS:     Biliary Tree: Postoperative changes of  orthotopic liver transplant.  Tapered narrowing at the choledocho-choledochal anastomosis measuring  1.5 mm. Mild adjacent susceptibility artifact. There is no significant  central biliary dilatation with the transplant mild prominence of the  central left hepatic duct measuring 5 mm. The right posterior duct  empties into the right anterior duct. Increased T2 signal within the  gabrielle hepatis. Common hepatic duct measuring 4 mm the level of the  gabrielle hepatis     Pancreas: Parenchymal atrophy with loss of intrinsic T1 hyperintensity  of the pancreatic parenchyma. The pancreatic duct is within normal  limits. No focal lesion.     Liver: Postoperative changes of orthotopic liver transplant. Complex  T2 hyperintense/T1 hypointense subphrenic fluid collection measuring  2.5 x 2.5 x 5.5 cm. Trace ill-defined fluid signal along the falciform  ligament. Small amount of ill-defined T1 hyperintense and hemorrhage  layering along the posterior aspect of the right hepatic lobe  posterior to the caudate lobe measuring up to 1.5 cm in maximum  thickness (series 13, image 30). No significant steatosis or  hemosiderosis. Diffuse periportal edema and degenerative signal  throughout the gabrielle hepatis. Mild heterogeneous signal/enhancement  along the anteromedial aspect of the left hepatic lobe ( for example  series 17, image 35). IVC anastomosis is patent. The hepatic veins are  patent. The portal veins are patent. No significant biliary excretion  on the delayed 20 minute hepatobiliary phase.     Gallbladder: Surgically absent.     Spleen: No splenomegaly. No focal lesion.     Kidneys: No renal lesion. No hydronephrosis or hydroureter.     Adrenal glands: Unremarkable.     Bowel: No dilated loops of bowel. Colon is unremarkable.     Lymph nodes: No significant lymphadenopathy. Splenorenal portosystemic  shunt.     Blood vessels: Abdominal aorta is patent and within normal limits.  Hepatic vasculature described above.     Lung  bases: Small right pleural effusion. Associated right lower lobe  atelectasis. Streaky left basilar atelectasis.      Bones and soft tissues: Mild edema associated with the subcostal  incision. No aggressive osseous lesion.     Mesentery and abdominal wall: Mesenteric and diffuse abdominal wall  edema.Retroperitoneal edema and inflammatory stranding, presumably  post-surgical/reactive.      Ascites: Trace.                                                                      IMPRESSION:   1. Postoperative changes of orthotopic liver transplant. Perihepatic  postoperative fluid collections including: Complex subphrenic fluid  collection. Ill-defined fluid collection along the posterior aspect of  the liver, likely hematoma.  2. Mild narrowing at the choledochocholedochal anastomosis, may be  related to edema in the early postoperative period. No significant  intrahepatic biliary dilatation.  3. Diffuse abdominal wall edema, likely postoperative. Trace abdominal  ascites. Sequela of portal hypertension including splenorenal  portosystemic shunt.  4. Small right pleural effusion with associated atelectasis.        =======================================================================

## 2018-12-28 NOTE — PROGRESS NOTES
Care Coordinator  D: Assessment & Plan: 62 yo M hx Laennec's cirrhosis with HCC. S/p DBD OLT with right inguinal hernia repair with biologic mesh 12/22/18. Notable for small arterial anastomosis (donor common hepatic with atherosclerosis to recipient replaced right hepatic).   Graft function: POD #6. AP and ALT increased, remainder of LFTs down. 12/26 US: small fluid collection, otherwise normal. MRCP concern for biliary anastomotic narrowing. Will have ERCP today, ~ 1300--note per BASHIR Wei. Per Ivette pt might be ready for discharge on Monday. Pt is currently at ERCP.  I: Due to the holiday, I have sent and email to [facility] with referral for home RN. Currently OT is recommending  TCU--may progress to going home.  A: s/p LT  P: I will follow on Monday, and if he cna go home I will talk with him about HHN. I have updated [facility] clinic.

## 2018-12-28 NOTE — PLAN OF CARE
"2000 - 0730 - Transferred from   VS:/73 (BP Location: Left arm)   Pulse 63   Temp 98.3  F (36.8  C) (Oral)   Resp 18   Ht 1.702 m (5' 7\")   Wt 91.7 kg (202 lb 2.6 oz)   SpO2 93%   BMI 31.66 kg/m   - VSS on RA  BG ACHS: 221, 174  Labs:  Pain/Nausea: Denied nausea, oxycodone 5 mg x 1 for abdominal pain  Diet: Regular diet  LDA: PIV saline locked, KARINE #1 10 mL KARINE #2 350 mL output   GI:Cota output 600 mL - cota leaking urine color changed notified on-call  : No BM overnight  Skin:Incision stapled scant drainage, wound cleansed, hernia incision covered - dried drainage, skin bruised  Mobility: Up with assist x 1  Plan:  services scheduled for 0900, daughter would like to be updated. Family concerned about finances, placed social work consult. Continue to monitor.    "

## 2018-12-28 NOTE — ANESTHESIA PREPROCEDURE EVALUATION
Anesthesia Pre-Procedure Evaluation    Patient: Loy Limon   MRN:     [id number] Gender:   male   Age:    63 year old :      1955        Preoperative Diagnosis: Biliary Obstruction   Procedure(s):  ENDOSCOPIC RETROGRADE CHOLANGIOPANCREATOGRAM     Past Medical History:   Diagnosis Date     Cancer (H)     hepatocellualr carcinoma     Cirrhosis of liver (H) 2018     Enlarged prostate      Inguinal hernia      Liver lesion 2018      Past Surgical History:   Procedure Laterality Date     APPENDECTOMY       COLONOSCOPY      2018     HERNIORRHAPHY INGUINAL  2018    Procedure: Herniorrhaphy inguinal;  Surgeon: Emil Echevarria MD;  Location: [facility] OR     TRANSPLANT LIVER RECIPIENT  DONOR N/A 2018    Procedure: TRANSPLANT LIVER RECIPIENT  DONOR;  Surgeon: Emil Echevarria MD;  Location: [facility] OR          Anesthesia Evaluation     . Pt has had prior anesthetic. Type: General    No history of anesthetic complications          ROS/MED HX    ENT/Pulmonary:  - neg pulmonary ROS     Neurologic:  - neg neurologic ROS     Cardiovascular:  - neg cardiovascular ROS   (+) ----. : . . . :. . Previous cardiac testing Echodate:2018results:Global and regional left ventricular function is normal with an EF of 60-65%.Right ventricular function, chamber size, wall motion, and thickness are  normal.Severe left atrial enlargement is present.  The inferior vena cava is normal.  No pericardial effusion is present.date: results: date: results: date: results:          METS/Exercise Tolerance:     Hematologic:     (+) Anemia, -      Musculoskeletal:         GI/Hepatic: Comment: S/p liver transplant on 2018.     (+) liver disease,       Renal/Genitourinary:  - ROS Renal section negative       Endo:  - neg endo ROS       Psychiatric:         Infectious Disease:         Malignancy:      - no malignancy   Other:    - neg other ROS                     PHYSICAL EXAM:   Mental Status/Neuro: A/A/O  "  Airway: Facies: Feasible  Mallampati: I  Mouth/Opening: Full  TM distance: > 6 cm  Neck ROM: Full   Respiratory:   Resp. Rate: Normal     Resp. Effort: Normal      CV: Rhythm: Regular  Rate: Age appropriate   Comments:      Dental: Normal                  Lab Results   Component Value Date    WBC 5.6 12/28/2018    HGB 8.0 (L) 12/28/2018    HCT 24.5 (L) 12/28/2018    PLT 95 (L) 12/28/2018     12/28/2018    POTASSIUM 3.8 12/28/2018    CHLORIDE 102 12/28/2018    CO2 25 12/28/2018    BUN 24 12/28/2018    CR 0.80 12/28/2018     (H) 12/28/2018    YELENA 7.6 (L) 12/28/2018    PHOS 2.8 12/28/2018    MAG 2.3 12/28/2018    ALBUMIN 1.7 (L) 12/28/2018    PROTTOTAL 5.4 (L) 12/28/2018     (H) 12/28/2018    AST 93 (H) 12/28/2018    ALKPHOS 840 (H) 12/28/2018    BILITOTAL 1.2 12/28/2018    LIPASE 93 12/23/2018    AMYLASE 46 12/23/2018    PTT 40 (H) 12/22/2018    INR 1.36 (H) 12/23/2018    FIBR 220 12/23/2018    TSH 6.36 (H) 07/19/2018    T4 1.04 07/19/2018       Preop Vitals  BP Readings from Last 3 Encounters:   12/28/18 131/64   12/20/18 149/75   12/04/18 168/83    Pulse Readings from Last 3 Encounters:   12/27/18 63   12/20/18 52   12/04/18 59      Resp Readings from Last 3 Encounters:   12/28/18 16   12/04/18 18   11/07/18 18    SpO2 Readings from Last 3 Encounters:   12/28/18 96%   12/20/18 96%   11/07/18 99%      Temp Readings from Last 1 Encounters:   12/28/18 36.6  C (97.8  F) (Oral)    Ht Readings from Last 1 Encounters:   12/22/18 1.702 m (5' 7\")      Wt Readings from Last 1 Encounters:   12/28/18 90.6 kg (199 lb 11.2 oz)    Estimated body mass index is 31.28 kg/m  as calculated from the following:    Height as of this encounter: 1.702 m (5' 7\").    Weight as of this encounter: 90.6 kg (199 lb 11.2 oz).     LDA:  Peripheral IV 12/26/18 Left;Anterior Lower forearm (Active)   Site Assessment WDL 12/28/2018  8:00 AM   Line Status Saline locked 12/28/2018  8:00 AM   Phlebitis Scale 0-->no symptoms " 12/28/2018  8:00 AM   Infiltration Scale 0 12/28/2018  8:00 AM   Extravasation? No 12/28/2018  8:00 AM   Number of days: 2       Peripheral IV 12/26/18 Right;Anterior Lower forearm (Active)   Site Assessment [facility] 12/28/2018  8:00 AM   Line Status Saline locked 12/28/2018  8:00 AM   Phlebitis Scale 0-->no symptoms 12/28/2018  8:00 AM   Infiltration Scale 0 12/28/2018  8:00 AM   Extravasation? No 12/28/2018  8:00 AM   Number of days: 2       Pulmonary Artery Catheter Assessment - Single Lumen 12/22/18 0908 (Active)   Number of days: 6       Closed/Suction Drain 1 Right;Ventral RLQ Bulb 19 [place] (Active)   Site Description [facility] 12/28/2018 12:20 PM   Dressing Status Drainage - Dried 12/28/2018  8:00 AM   Dressing Change Due 12/26/18 12/26/2018  1:25 PM   Drainage Appearance Serosanguenous 12/28/2018 12:00 AM   Status To bulb suction 12/28/2018  8:00 AM   Output (ml) 10 ml 12/28/2018  6:00 AM   Number of days: 6       Closed/Suction Drain 2 Right;Inferior Groin Bulb 19 [place] (Active)   Site Description [facility] 12/28/2018 12:20 PM   Dressing Status Drainage - Dried 12/28/2018  8:00 AM   Dressing Change Due 12/26/18 12/26/2018  1:25 PM   Drainage Appearance Serosanguenous 12/28/2018 12:00 AM   Status To bulb suction 12/28/2018  8:00 AM   Output (ml) 250 ml 12/28/2018  6:00 AM   Number of days: 6       Urethral Catheter Latex;Temperature probe;Double-lumen 16 fr (Active)   Tube Description Positional 12/28/2018  8:00 AM   Catheter Care Done 12/26/2018  8:00 PM   Collection Container Standard 12/28/2018  8:00 AM   Securement Method Securing device (Describe) 12/28/2018  8:00 AM   Rationale for Continued Use Strict 1-2 Hour I&O 12/28/2018  8:00 AM   Urine Output 400 mL 12/28/2018  9:43 AM   Number of days: 6            Assessment:   ASA SCORE: 3    Will be NPO appropriate at: 12/28/2018 2:11 PM   Documentation: H&P complete; Preop Testing complete; Consents complete   Proceeding: Proceed without further delay  Tobacco Use:  NO  Active use of Tobacco/UNKNOWN Tobacco use status     Plan:   Anes. Type:  General      Induction:  IV (Standard)   Airway: Oral ETT   Access/Monitoring: PIV   Maintenance: Balanced   Emergence: Procedure Site   Logistics: Observation/Admission     Postop Pain/Sedation Strategy:  Standard-Options: Opioids PRN     PONV Management:  Adult Risk Factors:, Non-Smoker, Postop Opioids  Prevention: Ondansetron; Dexamethasone     CONSENT: Direct conversation   Plan and risks discussed with: Patient                          63yM. Hx HCC is now about 1 week s/p OLT and presents today for ERCP. Otherwise relatively healthy with no major organ dysfunction. GETA, decadron+zofran.  ___________________   Riaz Nolen MD          Pager: [phone]

## 2018-12-28 NOTE — PLAN OF CARE
Discharge Planner PT  7A  Patient plan for discharge: Not discussed at this date  Current status: Session limited at this date 2/2 RN stating transport soon taking pt for procedure. Pt seated in chair upon arrival, performs sit<>stand and transfer to EOB with CGA. Pt performs sit>supine via log roll technique with modA at LEs, needs cues and reminders for abdominal precautions.  Barriers to return to prior living situation: Medical status, level of assist, abdominal precautions, decreased activity tolerance  Recommendations for discharge: TCU, may have potential to discharge home with HHPT pending LOS and progress with therapies  Rationale for recommendations: Pt currently below baseline in regards to functional mobility, would benefit from continued skilled therapy services to progress strength, endurance, balance, and safety and independence with functional mobility prior to safe return home.        Entered by: Nubia Up 12/28/2018 11:57 AM

## 2018-12-29 ENCOUNTER — OFFICE VISIT (OUTPATIENT)
Dept: INTERPRETER SERVICES | Facility: CLINIC | Age: 63
End: 2018-12-29
Payer: MEDICARE

## 2018-12-29 ENCOUNTER — APPOINTMENT (OUTPATIENT)
Dept: OCCUPATIONAL THERAPY | Facility: CLINIC | Age: 63
DRG: 005 | End: 2018-12-29
Attending: TRANSPLANT SURGERY
Payer: MEDICARE

## 2018-12-29 ENCOUNTER — APPOINTMENT (OUTPATIENT)
Dept: PHYSICAL THERAPY | Facility: CLINIC | Age: 63
DRG: 005 | End: 2018-12-29
Attending: TRANSPLANT SURGERY
Payer: MEDICARE

## 2018-12-29 LAB
ALBUMIN SERPL-MCNC: 1.6 G/DL (ref 3.4–5)
ALP SERPL-CCNC: 780 U/L (ref 40–150)
ALT SERPL W P-5'-P-CCNC: 212 U/L (ref 0–70)
ANION GAP SERPL CALCULATED.3IONS-SCNC: 7 MMOL/L (ref 3–14)
AST SERPL W P-5'-P-CCNC: 70 U/L (ref 0–45)
BACTERIA SPEC CULT: NORMAL
BILIRUB DIRECT SERPL-MCNC: 0.8 MG/DL (ref 0–0.2)
BILIRUB SERPL-MCNC: 1.1 MG/DL (ref 0.2–1.3)
BUN SERPL-MCNC: 27 MG/DL (ref 7–30)
CALCIUM SERPL-MCNC: 7.4 MG/DL (ref 8.5–10.1)
CHLORIDE SERPL-SCNC: 105 MMOL/L (ref 94–109)
CO2 SERPL-SCNC: 26 MMOL/L (ref 20–32)
CREAT SERPL-MCNC: 1.13 MG/DL (ref 0.66–1.25)
ERYTHROCYTE [DISTWIDTH] IN BLOOD BY AUTOMATED COUNT: 13.8 % (ref 10–15)
GFR SERPL CREATININE-BSD FRML MDRD: 68 ML/MIN/{1.73_M2}
GLUCOSE BLDC GLUCOMTR-MCNC: 146 MG/DL (ref 70–99)
GLUCOSE BLDC GLUCOMTR-MCNC: 171 MG/DL (ref 70–99)
GLUCOSE BLDC GLUCOMTR-MCNC: 177 MG/DL (ref 70–99)
GLUCOSE BLDC GLUCOMTR-MCNC: 371 MG/DL (ref 70–99)
GLUCOSE SERPL-MCNC: 169 MG/DL (ref 70–99)
HCT VFR BLD AUTO: 23.6 % (ref 40–53)
HGB BLD-MCNC: 7.8 G/DL (ref 13.3–17.7)
Lab: NORMAL
MAGNESIUM SERPL-MCNC: 2.1 MG/DL (ref 1.6–2.3)
MCH RBC QN AUTO: 31.6 PG (ref 26.5–33)
MCHC RBC AUTO-ENTMCNC: 33.1 G/DL (ref 31.5–36.5)
MCV RBC AUTO: 96 FL (ref 78–100)
PHOSPHATE SERPL-MCNC: 4.3 MG/DL (ref 2.5–4.5)
PLATELET # BLD AUTO: 99 10E9/L (ref 150–450)
POTASSIUM SERPL-SCNC: 4.1 MMOL/L (ref 3.4–5.3)
PROT SERPL-MCNC: 4.9 G/DL (ref 6.8–8.8)
RBC # BLD AUTO: 2.47 10E12/L (ref 4.4–5.9)
SODIUM SERPL-SCNC: 138 MMOL/L (ref 133–144)
SPECIMEN SOURCE: NORMAL
TACROLIMUS BLD-MCNC: 5.1 UG/L (ref 5–15)
TME LAST DOSE: NORMAL H
WBC # BLD AUTO: 4.3 10E9/L (ref 4–11)

## 2018-12-29 PROCEDURE — 25000125 ZZHC RX 250: Performed by: NURSE PRACTITIONER

## 2018-12-29 PROCEDURE — 40000193 ZZH STATISTIC PT WARD VISIT

## 2018-12-29 PROCEDURE — 00000146 ZZHCL STATISTIC GLUCOSE BY METER IP

## 2018-12-29 PROCEDURE — A9270 NON-COVERED ITEM OR SERVICE: HCPCS | Mod: GY | Performed by: SURGERY

## 2018-12-29 PROCEDURE — 40000141 ZZH STATISTIC PERIPHERAL IV START W/O US GUIDANCE

## 2018-12-29 PROCEDURE — 97530 THERAPEUTIC ACTIVITIES: CPT | Mod: GP

## 2018-12-29 PROCEDURE — 25000132 ZZH RX MED GY IP 250 OP 250 PS 637: Mod: GY | Performed by: STUDENT IN AN ORGANIZED HEALTH CARE EDUCATION/TRAINING PROGRAM

## 2018-12-29 PROCEDURE — 25000132 ZZH RX MED GY IP 250 OP 250 PS 637: Mod: GY | Performed by: SURGERY

## 2018-12-29 PROCEDURE — 25000131 ZZH RX MED GY IP 250 OP 636 PS 637: Mod: GY | Performed by: PHYSICIAN ASSISTANT

## 2018-12-29 PROCEDURE — 25000128 H RX IP 250 OP 636: Performed by: TRANSPLANT SURGERY

## 2018-12-29 PROCEDURE — 36415 COLL VENOUS BLD VENIPUNCTURE: CPT | Performed by: INTERNAL MEDICINE

## 2018-12-29 PROCEDURE — A9270 NON-COVERED ITEM OR SERVICE: HCPCS | Mod: GY | Performed by: PHYSICIAN ASSISTANT

## 2018-12-29 PROCEDURE — 12000026 ZZH R&B TRANSPLANT

## 2018-12-29 PROCEDURE — 25000131 ZZH RX MED GY IP 250 OP 636 PS 637: Mod: GY | Performed by: NURSE PRACTITIONER

## 2018-12-29 PROCEDURE — T1013 SIGN LANG/ORAL INTERPRETER: HCPCS | Mod: U3

## 2018-12-29 PROCEDURE — 25000132 ZZH RX MED GY IP 250 OP 250 PS 637: Mod: GY | Performed by: PHYSICIAN ASSISTANT

## 2018-12-29 PROCEDURE — 85027 COMPLETE CBC AUTOMATED: CPT | Performed by: INTERNAL MEDICINE

## 2018-12-29 PROCEDURE — 97110 THERAPEUTIC EXERCISES: CPT | Mod: GP

## 2018-12-29 PROCEDURE — 83735 ASSAY OF MAGNESIUM: CPT | Performed by: INTERNAL MEDICINE

## 2018-12-29 PROCEDURE — 80048 BASIC METABOLIC PNL TOTAL CA: CPT | Performed by: INTERNAL MEDICINE

## 2018-12-29 PROCEDURE — 97140 MANUAL THERAPY 1/> REGIONS: CPT | Mod: GO | Performed by: OCCUPATIONAL THERAPIST

## 2018-12-29 PROCEDURE — 25000132 ZZH RX MED GY IP 250 OP 250 PS 637: Mod: GY | Performed by: TRANSPLANT SURGERY

## 2018-12-29 PROCEDURE — 25000131 ZZH RX MED GY IP 250 OP 636 PS 637: Mod: GY | Performed by: SURGERY

## 2018-12-29 PROCEDURE — A9270 NON-COVERED ITEM OR SERVICE: HCPCS | Mod: GY | Performed by: STUDENT IN AN ORGANIZED HEALTH CARE EDUCATION/TRAINING PROGRAM

## 2018-12-29 PROCEDURE — 25000128 H RX IP 250 OP 636: Performed by: NURSE PRACTITIONER

## 2018-12-29 PROCEDURE — 40000133 ZZH STATISTIC OT WARD VISIT: Performed by: OCCUPATIONAL THERAPIST

## 2018-12-29 PROCEDURE — 25000132 ZZH RX MED GY IP 250 OP 250 PS 637: Mod: GY | Performed by: NURSE PRACTITIONER

## 2018-12-29 PROCEDURE — 80076 HEPATIC FUNCTION PANEL: CPT | Performed by: INTERNAL MEDICINE

## 2018-12-29 PROCEDURE — 80197 ASSAY OF TACROLIMUS: CPT | Performed by: INTERNAL MEDICINE

## 2018-12-29 PROCEDURE — A9270 NON-COVERED ITEM OR SERVICE: HCPCS | Mod: GY | Performed by: NURSE PRACTITIONER

## 2018-12-29 PROCEDURE — 84100 ASSAY OF PHOSPHORUS: CPT | Performed by: INTERNAL MEDICINE

## 2018-12-29 RX ORDER — DIPHENHYDRAMINE HYDROCHLORIDE AND LIDOCAINE HYDROCHLORIDE AND ALUMINUM HYDROXIDE AND MAGNESIUM HYDRO
10 KIT EVERY 6 HOURS PRN
Status: DISCONTINUED | OUTPATIENT
Start: 2018-12-29 | End: 2019-01-07

## 2018-12-29 RX ADMIN — SULFAMETHOXAZOLE AND TRIMETHOPRIM 1 TABLET: 400; 80 TABLET ORAL at 08:10

## 2018-12-29 RX ADMIN — ASPIRIN 325 MG: 325 TABLET, DELAYED RELEASE ORAL at 08:10

## 2018-12-29 RX ADMIN — TACROLIMUS 6 MG: 5 CAPSULE ORAL at 17:31

## 2018-12-29 RX ADMIN — FUROSEMIDE 20 MG: 10 INJECTION, SOLUTION INTRAVENOUS at 08:08

## 2018-12-29 RX ADMIN — OMEPRAZOLE 20 MG: 20 CAPSULE, DELAYED RELEASE ORAL at 08:11

## 2018-12-29 RX ADMIN — PIPERACILLIN SODIUM AND TAZOBACTAM SODIUM 3.38 G: 3; .375 INJECTION, POWDER, LYOPHILIZED, FOR SOLUTION INTRAVENOUS at 14:41

## 2018-12-29 RX ADMIN — MYCOPHENOLATE MOFETIL 1000 MG: 250 CAPSULE ORAL at 08:10

## 2018-12-29 RX ADMIN — SENNOSIDES AND DOCUSATE SODIUM 2 TABLET: 8.6; 5 TABLET ORAL at 08:10

## 2018-12-29 RX ADMIN — MULTIPLE VITAMINS W/ MINERALS TAB 1 TABLET: TAB at 08:11

## 2018-12-29 RX ADMIN — ALFUZOSIN HYDROCHLORIDE 10 MG: 10 TABLET, EXTENDED RELEASE ORAL at 08:11

## 2018-12-29 RX ADMIN — PIPERACILLIN SODIUM AND TAZOBACTAM SODIUM 3.38 G: 3; .375 INJECTION, POWDER, LYOPHILIZED, FOR SOLUTION INTRAVENOUS at 02:09

## 2018-12-29 RX ADMIN — MYCOPHENOLATE MOFETIL 1000 MG: 250 CAPSULE ORAL at 17:31

## 2018-12-29 RX ADMIN — PIPERACILLIN SODIUM AND TAZOBACTAM SODIUM 3.38 G: 3; .375 INJECTION, POWDER, LYOPHILIZED, FOR SOLUTION INTRAVENOUS at 08:08

## 2018-12-29 RX ADMIN — FUROSEMIDE 20 MG: 10 INJECTION, SOLUTION INTRAVENOUS at 20:14

## 2018-12-29 RX ADMIN — VALGANCICLOVIR 450 MG: 450 TABLET, FILM COATED ORAL at 08:10

## 2018-12-29 RX ADMIN — SENNOSIDES AND DOCUSATE SODIUM 2 TABLET: 8.6; 5 TABLET ORAL at 20:13

## 2018-12-29 RX ADMIN — PIPERACILLIN SODIUM AND TAZOBACTAM SODIUM 3.38 G: 3; .375 INJECTION, POWDER, LYOPHILIZED, FOR SOLUTION INTRAVENOUS at 20:13

## 2018-12-29 RX ADMIN — METOPROLOL TARTRATE 75 MG: 50 TABLET ORAL at 20:13

## 2018-12-29 RX ADMIN — INSULIN GLARGINE 5 UNITS: 100 INJECTION, SOLUTION SUBCUTANEOUS at 20:14

## 2018-12-29 RX ADMIN — TACROLIMUS 5 MG: 5 CAPSULE ORAL at 08:10

## 2018-12-29 RX ADMIN — PREDNISONE 5 MG: 5 TABLET ORAL at 08:10

## 2018-12-29 ASSESSMENT — ACTIVITIES OF DAILY LIVING (ADL)
ADLS_ACUITY_SCORE: 13

## 2018-12-29 ASSESSMENT — MIFFLIN-ST. JEOR: SCORE: 1641.3

## 2018-12-29 NOTE — PLAN OF CARE
"  *Swiss Speaking*  /70   Pulse 73   Temp 98.1  F (36.7  C) (Oral)   Resp 12   Ht 1.702 m (5' 7\")   Wt 90.6 kg (199 lb 11.2 oz)   SpO2 97%   BMI 31.28 kg/m     VS: AVSS and afebrile, Currently on 1LPM (CAPNO) due to having ERCP done today. B, 164, and 226. LABS: Increase in liver labs, ERCP done today. Pain: oxy 5mg x2 with effective relief. Nausea: denies. Diet: Clears. LDA: PIV x2. GTT: SL'd. GI/: Cronin with adequate output, No BM this shift. Skin: incision with dried drainage (wound cleanser), Inguinal hernia repair incision dressing changed and incision cleaned (stapled) no drainage. KARINE x2 reddened around site, cleaned and dressings changed. Mobility: Assist x1. Education: Medications and Lab books gone over with patient and , also gone over with daughter, no questions and verbalized understanding. Tests/Procedures: ERCP done. Plan: Continue to monitor lab levels, treat pain, watch for drainage or bleeding and follow plan of care. All care explained and questions answered. Will continue to monitor and notify team of changes.     "

## 2018-12-29 NOTE — PROGRESS NOTES
Transplant Surgery  Inpatient Daily Progress Note  12/29/2018    Assessment & Plan: 64 yo M hx Laennec's cirrhosis with HCC. S/p DBD OLT with right inguinal hernia repair with biologic mesh 12/22/18. Notable for small arterial anastomosis (donor common hepatic with atherosclerosis to recipient replaced right hepatic).    Graft function: POD 7. AP and ALT decreasing in concert with remainder of LFTs. 12/26 US: small fluid collection, otherwise normal. MRCP concern for biliary anastomotic narrowing. ERCP with minimal stricture- stented. Biliary sphincterotomy also performed.   Immunosuppression management: Induction with steroid pulse.  Methylpred:  Taper per protocol, continue pred 5 until tac level at goal  MMF: 1000mg BID  Tacro: Goal level 10-15. Level 5.1. Anticipate increase in level in 1-2 weeks after rifampin is fully metabolized (last dose 12/20). Tac level every 48 hours. Increase to 5 BID. Will also fall when antifungal stopped- monitor   Complexity of management: High. Contributing factors: induction  Hematology:   Anemia: Hgb stable  Vascular prophylaxis: Due to small HA anastomosis. Received dextran X48 hrs, on aspirin 325.   Cardiorespiratory:   Post operative atrial fibrillation with RVR: On 12/25. Started on scheduled metoprolol and amio bolus/drip. Trops low/negative. 12/25 Echo: severely dilated left atrium, mild to moderate LVH, preserved EF. Converted back to NSR 12/25 PM. Run of aberrant AF vs VT (14 beats) yesterday. Cardiology consulted. Recommend continue rate control with metoprolol 75 mg BID, no indication for anticoagulation. NSVT - 2 short runs documented on tele. Cards recommended no further cardiac testing or management.   Hypoxia/dyspnea/reactive airways: Mild hypoxia and wheezing, improved. Continue PRN nebs and diuresis.  HTN: Amlodipine 10mg and metoprolol to 75mg oral BID. Pressures controlled  GI/Nutrition: diet advanced. Taking 3 shakes daily, wants to eat outside food. Instructed  to write down what he eats for sarah counts.   Constipation: improving- passing flatus. BM+, continue regimen  Endocrine:   Hyperglycemia: A1c 5.8% on admission. Steroid induced hyperglycemia. Novolog SSI.  Fluid/Electrolytes:   Hypervolemia: continue BID lasix for swelling/weight gain.   :   Hematuria: Developed 12/26 with manipulation of cota. Has cota. Mild hematuria.  Bladder spasms: No c/o today. B&O supp ordered.  Penile edema: Cota in place. Diurese today. Support sling while up/ambulating. 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  Prophylaxis: DVT, fall, GI, continue diflucan given herniorraphy with mesh. Stop Vanco.  Disposition: 7A    Medical Decision Making: Medium    IRVIN/Fellow/Resident Provider: Sebastián Agudelo, Fellow    Faculty: Shaila De La Cruz MD    __________________________________________________________________  Transplant History: Admitted 12/21/2018 for DDLT and RIH repair (XenMatrix mesh).   The patient has a history of liver failure due to Laennec's cirrhosis with HCC.    12/22/2018 (Liver), Postoperative day: 7     Interval History: No complaints. Eating well, BM + but still feels swollen. Inguinal/scrotal swelling improving but still markedly swollen. No other complaints.   ROS:   A 10-point review of systems was negative except as noted above.    Curent Meds:    alfuzosin ER  10 mg Oral Daily with breakfast     amLODIPine  10 mg Oral Daily     aspirin  325 mg Oral Daily     bisacodyl  10 mg Rectal Once     furosemide  20 mg Intravenous BID     insulin aspart  1-10 Units Subcutaneous TID AC     insulin aspart  1-7 Units Subcutaneous At Bedtime     insulin aspart   Subcutaneous TID AC     metoprolol tartrate  75 mg Oral BID     multivitamin w/minerals  1 tablet Oral Daily     mycophenolate  1,000 mg Oral BID IS     omeprazole  20 mg Oral QAM AC     piperacillin-tazobactam  3.375 g  "Intravenous Q6H     polyethylene glycol  17 g Oral Daily     predniSONE  5 mg Oral Daily     senna-docusate  2 tablet Oral or Feeding Tube BID     sodium chloride (PF)  3 mL Intravenous Q8H     sulfamethoxazole-trimethoprim  1 tablet Oral or Feeding Tube Daily     tacrolimus  5 mg Oral BID IS     valGANciclovir  450 mg Oral Daily       Physical Exam:     Admit Weight: 84.6 kg (186 lb 9.6 oz)    Current Vitals:   /52 (BP Location: Left arm)   Pulse 73   Temp 98.1  F (36.7  C) (Oral)   Resp 16   Ht 1.702 m (5' 7\")   Wt 89.8 kg (197 lb 14.4 oz)   SpO2 96%   BMI 31.00 kg/m      Vital sign ranges:    Temp:  [97.8  F (36.6  C)-98.1  F (36.7  C)] 98.1  F (36.7  C)  Pulse:  [62-73] 73  Heart Rate:  [57-79] 70  Resp:  [12-19] 16  BP: (108-146)/(49-73) 116/52  SpO2:  [95 %-98 %] 96 %  Patient Vitals for the past 24 hrs:   BP Temp Temp src Pulse Heart Rate Resp SpO2 Weight   12/29/18 1214 116/52 98.1  F (36.7  C) Oral -- 70 16 96 % --   12/29/18 1138 108/49 -- -- -- 69 -- 95 % --   12/29/18 0931 -- -- -- -- -- -- -- 89.8 kg (197 lb 14.4 oz)   12/29/18 0812 117/68 97.8  F (36.6  C) Oral -- 59 16 97 % --   12/29/18 0421 111/61 98  F (36.7  C) Oral -- 59 15 97 % --   12/29/18 0100 -- -- -- -- -- -- 97 % --   12/28/18 2318 119/61 98.1  F (36.7  C) Oral -- 57 13 98 % --   12/28/18 1900 138/58 98.1  F (36.7  C) Oral -- 69 19 95 % --   12/28/18 1830 143/60 -- -- -- 79 -- -- --   12/28/18 1801 130/70 -- -- -- 65 -- -- --   12/28/18 1729 146/67 98.1  F (36.7  C) Oral -- 71 12 97 % --   12/28/18 1700 132/73 97.8  F (36.6  C) Oral 73 63 14 98 % --   12/28/18 1630 129/62 -- -- 62 62 14 98 % --   12/28/18 1615 117/65 -- -- -- 58 13 97 % --   12/28/18 1600 -- 97.8  F (36.6  C) Oral -- -- 16 -- --   12/28/18 1545 -- -- -- -- -- 16 -- --   12/28/18 1530 -- 97.8  F (36.6  C) Oral -- -- 19 -- --     General Appearance: in no apparent distress.   Skin: normal, warm, dry, No rashes, induration, or jaundice  Heart: RRR  Lungs: B/l " diminished, without wheezes  Abdomen: abd soft, slightly distended. Incision c/d/i, staples in place. KARINE X2- serosang.   : The genitalia are edematous   Extremities: edema: present bilaterally. 2+. There is no skin breakdown.  Neurologic: awake, alert and oriented x4. Tremor absent.  PIV only      Data:   CMP  Recent Labs   Lab 12/29/18  0636 12/28/18  0556  12/23/18  0404  12/22/18 2010    135   < > 144   < > 139   POTASSIUM 4.1 3.8   < > 3.6   < > 3.5   CHLORIDE 105 102   < > 112*   < >  --    CO2 26 25   < > 24   < >  --    * 169*   < > 107*   < > 172*   BUN 27 24   < > 18   < >  --    CR 1.13 0.80   < > 0.66   < >  --    GFRESTIMATED 68 >90   < > >90   < >  --    GFRESTBLACK 78 >90   < > >90   < >  --    YELENA 7.4* 7.6*   < > 6.9*   < >  --    ICAW  --   --   --  4.5  --  4.8   MAG 2.1 2.3   < > 1.7  --   --    PHOS 4.3 2.8   < > 4.4  --   --    AMYLASE  --   --   --  46  --   --    LIPASE  --   --   --  93  --   --    ALBUMIN 1.6* 1.7*   < > 1.6*   < >  --    BILITOTAL 1.1 1.2   < > 1.7*   < >  --    ALKPHOS 780* 840*   < > 166*   < >  --    AST 70* 93*   < > 773*   < >  --    * 241*   < > 602*   < >  --     < > = values in this interval not displayed.     CBC  Recent Labs   Lab 12/29/18  0636 12/28/18  0556  12/22/18 2121   HGB 7.8* 8.0*   < > 9.3*   WBC 4.3 5.6   < > 8.0   PLT 99* 95*   < > 66*   A1C  --   --   --  5.8*    < > = values in this interval not displayed.     COAGS  Recent Labs   Lab 12/23/18  1204 12/23/18  0404 12/22/18  2121   INR 1.36* 1.61* 2.04*   PTT  --   --  40*      Urinalysis  Recent Labs   Lab Test 12/25/18  0732 12/22/18  0034  07/19/18  1133   COLOR Yellow Yellow   < > Yellow   APPEARANCE Slightly Cloudy Clear   < > Clear   URINEGLC Negative Negative   < > Negative   URINEBILI Small* Negative   < > Negative   URINEKETONE Negative Negative   < > Negative   SG 1.021 1.012   < > 1.009   UBLD Moderate* Trace*   < > Negative   URINEPH 6.0 6.5   < > 8.0*   PROTEIN 30*  Negative   < > Negative   NITRITE Negative Negative   < > Negative   LEUKEST Negative Negative   < > Negative   RBCU 7* 3*   < >  --    WBCU 1 1   < >  --    UTPG  --   --   --  Unable to calculate due to low value    < > = values in this interval not displayed.     Virology:  Hepatitis C Antibody   Date Value Ref Range Status   12/22/2018 Nonreactive NR^Nonreactive Final     Comment:     Assay performance characteristics have not been established for newborns,   infants, and children

## 2018-12-29 NOTE — PLAN OF CARE
Edema 7A: Recommend continued use of compression wraps to B LEs for further management of 1+/2+ pitting edema distally, skin intact. Reapplication of GCB from MTPs to knee creases using light compression to avoid fluid relocation proximally to scrotum. Significant scrotal edema with skin intact and cota in place. Patient fit with aakash scrotal support sling. Remove all compression if causing pain/numbness or become soiled.

## 2018-12-29 NOTE — PLAN OF CARE
"/61 (BP Location: Left arm)   Pulse 73   Temp 98  F (36.7  C) (Oral)   Resp 15   Ht 1.702 m (5' 7\")   Wt 90.6 kg (199 lb 11.2 oz)   SpO2 97%   BMI 31.28 kg/m       1900 - 0700: HR kim 50-60's. OVSS. Weaned from 2L NC to RA. On tele - NSR. Alert and oriented. Up with assist of 1. Denies pain and nausea.  and 177. R PIV saline locked. L PIV TKO for abx. Cronin with adequate amounts of tea/yellow colored OP, on IV lasix. No BM this shift. R KARINE x2. KARINE #2 with large amounts of serosang OP. KARINE #1 with scant OP. Incision stapled and IVETT, no drainage noted. Lymph wraps in place BLE. Resting between cares. Will continue to monitor and notify of any changes.   "

## 2018-12-29 NOTE — PLAN OF CARE
Discharge Planner PT  PT 7A  Patient plan for discharge: not discussed today  Current status: Pt completed seated and standing LE exercises. Transfer sit to stand is slow due to abdominal soreness and scrotal edema. Pt completed transfer with supervision of 1. Standing balance is good with hand hold assist of 1 for guarding. Pt ambulated with hand hold assist of 1 ~ 300'. Pt's pace gradually increased as he walked and he released hand hold assist after ~ 125' since he felt confident walking. Ambulatory balance was good  Barriers to return to prior living situation: Medical condition, decreased functional endurance, transfers with supervision/SBA  Recommendations for discharge: TCU is recommended at this time, but depending upon pt's length of stay and continued progress, discharge home with home health PT/OT may be possible.  Rationale for recommendations: Continued rehab recommended to progress with transfers, ambulation, functional endurance, and stairs.       Entered by: Ji Claros 12/29/2018 12:28 PM

## 2018-12-30 ENCOUNTER — APPOINTMENT (OUTPATIENT)
Dept: PHYSICAL THERAPY | Facility: CLINIC | Age: 63
DRG: 005 | End: 2018-12-30
Attending: TRANSPLANT SURGERY
Payer: MEDICARE

## 2018-12-30 ENCOUNTER — OFFICE VISIT (OUTPATIENT)
Dept: INTERPRETER SERVICES | Facility: CLINIC | Age: 63
End: 2018-12-30
Payer: MEDICARE

## 2018-12-30 ENCOUNTER — APPOINTMENT (OUTPATIENT)
Dept: ULTRASOUND IMAGING | Facility: CLINIC | Age: 63
DRG: 005 | End: 2018-12-30
Attending: PHYSICIAN ASSISTANT
Payer: MEDICARE

## 2018-12-30 LAB
ALBUMIN SERPL-MCNC: 1.7 G/DL (ref 3.4–5)
ALP SERPL-CCNC: 1088 U/L (ref 40–150)
ALT SERPL W P-5'-P-CCNC: 341 U/L (ref 0–70)
ANION GAP SERPL CALCULATED.3IONS-SCNC: 8 MMOL/L (ref 3–14)
AST SERPL W P-5'-P-CCNC: 130 U/L (ref 0–45)
BILIRUB DIRECT SERPL-MCNC: 0.6 MG/DL (ref 0–0.2)
BILIRUB SERPL-MCNC: 0.8 MG/DL (ref 0.2–1.3)
BUN SERPL-MCNC: 31 MG/DL (ref 7–30)
CALCIUM SERPL-MCNC: 7.3 MG/DL (ref 8.5–10.1)
CHLORIDE SERPL-SCNC: 106 MMOL/L (ref 94–109)
CO2 SERPL-SCNC: 26 MMOL/L (ref 20–32)
CREAT SERPL-MCNC: 1.24 MG/DL (ref 0.66–1.25)
ERYTHROCYTE [DISTWIDTH] IN BLOOD BY AUTOMATED COUNT: 14.1 % (ref 10–15)
GFR SERPL CREATININE-BSD FRML MDRD: 60 ML/MIN/{1.73_M2}
GLUCOSE BLDC GLUCOMTR-MCNC: 181 MG/DL (ref 70–99)
GLUCOSE BLDC GLUCOMTR-MCNC: 185 MG/DL (ref 70–99)
GLUCOSE BLDC GLUCOMTR-MCNC: 196 MG/DL (ref 70–99)
GLUCOSE BLDC GLUCOMTR-MCNC: 213 MG/DL (ref 70–99)
GLUCOSE BLDC GLUCOMTR-MCNC: 217 MG/DL (ref 70–99)
GLUCOSE BLDC GLUCOMTR-MCNC: 223 MG/DL (ref 70–99)
GLUCOSE BLDC GLUCOMTR-MCNC: 233 MG/DL (ref 70–99)
GLUCOSE SERPL-MCNC: 181 MG/DL (ref 70–99)
HCT VFR BLD AUTO: 23.9 % (ref 40–53)
HGB BLD-MCNC: 7.8 G/DL (ref 13.3–17.7)
MAGNESIUM SERPL-MCNC: 2 MG/DL (ref 1.6–2.3)
MCH RBC QN AUTO: 31.6 PG (ref 26.5–33)
MCHC RBC AUTO-ENTMCNC: 32.6 G/DL (ref 31.5–36.5)
MCV RBC AUTO: 97 FL (ref 78–100)
PHOSPHATE SERPL-MCNC: 2.7 MG/DL (ref 2.5–4.5)
PLATELET # BLD AUTO: 160 10E9/L (ref 150–450)
POTASSIUM SERPL-SCNC: 3.9 MMOL/L (ref 3.4–5.3)
PROT SERPL-MCNC: 5.1 G/DL (ref 6.8–8.8)
RBC # BLD AUTO: 2.47 10E12/L (ref 4.4–5.9)
SODIUM SERPL-SCNC: 140 MMOL/L (ref 133–144)
TACROLIMUS BLD-MCNC: 4.7 UG/L (ref 5–15)
TME LAST DOSE: ABNORMAL H
WBC # BLD AUTO: 4.8 10E9/L (ref 4–11)

## 2018-12-30 PROCEDURE — 97116 GAIT TRAINING THERAPY: CPT | Mod: GP

## 2018-12-30 PROCEDURE — 25000132 ZZH RX MED GY IP 250 OP 250 PS 637: Mod: GY | Performed by: SURGERY

## 2018-12-30 PROCEDURE — 25000128 H RX IP 250 OP 636: Performed by: NURSE PRACTITIONER

## 2018-12-30 PROCEDURE — 25000128 H RX IP 250 OP 636: Performed by: TRANSPLANT SURGERY

## 2018-12-30 PROCEDURE — 00000146 ZZHCL STATISTIC GLUCOSE BY METER IP

## 2018-12-30 PROCEDURE — 80197 ASSAY OF TACROLIMUS: CPT | Performed by: PHYSICIAN ASSISTANT

## 2018-12-30 PROCEDURE — 25000132 ZZH RX MED GY IP 250 OP 250 PS 637: Mod: GY | Performed by: TRANSPLANT SURGERY

## 2018-12-30 PROCEDURE — 25000132 ZZH RX MED GY IP 250 OP 250 PS 637: Mod: GY | Performed by: NURSE PRACTITIONER

## 2018-12-30 PROCEDURE — 83735 ASSAY OF MAGNESIUM: CPT | Performed by: INTERNAL MEDICINE

## 2018-12-30 PROCEDURE — 25000125 ZZHC RX 250: Performed by: NURSE PRACTITIONER

## 2018-12-30 PROCEDURE — 40000193 ZZH STATISTIC PT WARD VISIT

## 2018-12-30 PROCEDURE — 76700 US EXAM ABDOM COMPLETE: CPT

## 2018-12-30 PROCEDURE — T1013 SIGN LANG/ORAL INTERPRETER: HCPCS | Mod: U3

## 2018-12-30 PROCEDURE — 84100 ASSAY OF PHOSPHORUS: CPT | Performed by: INTERNAL MEDICINE

## 2018-12-30 PROCEDURE — 85027 COMPLETE CBC AUTOMATED: CPT | Performed by: INTERNAL MEDICINE

## 2018-12-30 PROCEDURE — 12000026 ZZH R&B TRANSPLANT

## 2018-12-30 PROCEDURE — 97530 THERAPEUTIC ACTIVITIES: CPT | Mod: GP

## 2018-12-30 PROCEDURE — 36415 COLL VENOUS BLD VENIPUNCTURE: CPT | Performed by: INTERNAL MEDICINE

## 2018-12-30 PROCEDURE — 25000128 H RX IP 250 OP 636: Performed by: PHYSICIAN ASSISTANT

## 2018-12-30 PROCEDURE — A9270 NON-COVERED ITEM OR SERVICE: HCPCS | Mod: GY | Performed by: NURSE PRACTITIONER

## 2018-12-30 PROCEDURE — 80048 BASIC METABOLIC PNL TOTAL CA: CPT | Performed by: INTERNAL MEDICINE

## 2018-12-30 PROCEDURE — A9270 NON-COVERED ITEM OR SERVICE: HCPCS | Mod: GY | Performed by: PHYSICIAN ASSISTANT

## 2018-12-30 PROCEDURE — 80076 HEPATIC FUNCTION PANEL: CPT | Performed by: INTERNAL MEDICINE

## 2018-12-30 PROCEDURE — 25000131 ZZH RX MED GY IP 250 OP 636 PS 637: Mod: GY | Performed by: TRANSPLANT SURGERY

## 2018-12-30 PROCEDURE — 25000132 ZZH RX MED GY IP 250 OP 250 PS 637: Mod: GY | Performed by: STUDENT IN AN ORGANIZED HEALTH CARE EDUCATION/TRAINING PROGRAM

## 2018-12-30 PROCEDURE — 25000132 ZZH RX MED GY IP 250 OP 250 PS 637: Mod: GY | Performed by: PHYSICIAN ASSISTANT

## 2018-12-30 PROCEDURE — A9270 NON-COVERED ITEM OR SERVICE: HCPCS | Mod: GY | Performed by: STUDENT IN AN ORGANIZED HEALTH CARE EDUCATION/TRAINING PROGRAM

## 2018-12-30 PROCEDURE — 25000131 ZZH RX MED GY IP 250 OP 636 PS 637: Mod: GY | Performed by: SURGERY

## 2018-12-30 PROCEDURE — A9270 NON-COVERED ITEM OR SERVICE: HCPCS | Mod: GY | Performed by: SURGERY

## 2018-12-30 RX ORDER — TACROLIMUS 5 MG/1
5 CAPSULE ORAL ONCE
Status: COMPLETED | OUTPATIENT
Start: 2018-12-30 | End: 2018-12-30

## 2018-12-30 RX ADMIN — OMEPRAZOLE 20 MG: 20 CAPSULE, DELAYED RELEASE ORAL at 08:50

## 2018-12-30 RX ADMIN — TACROLIMUS 5 MG: 5 CAPSULE ORAL at 15:41

## 2018-12-30 RX ADMIN — MYCOPHENOLATE MOFETIL 1000 MG: 250 CAPSULE ORAL at 08:50

## 2018-12-30 RX ADMIN — TACROLIMUS 6 MG: 5 CAPSULE ORAL at 08:49

## 2018-12-30 RX ADMIN — MULTIPLE VITAMINS W/ MINERALS TAB 1 TABLET: TAB at 08:51

## 2018-12-30 RX ADMIN — SENNOSIDES AND DOCUSATE SODIUM 2 TABLET: 8.6; 5 TABLET ORAL at 08:49

## 2018-12-30 RX ADMIN — METOPROLOL TARTRATE 75 MG: 50 TABLET ORAL at 08:50

## 2018-12-30 RX ADMIN — TACROLIMUS 8 MG: 5 CAPSULE ORAL at 18:15

## 2018-12-30 RX ADMIN — PIPERACILLIN SODIUM AND TAZOBACTAM SODIUM 3.38 G: 3; .375 INJECTION, POWDER, LYOPHILIZED, FOR SOLUTION INTRAVENOUS at 20:06

## 2018-12-30 RX ADMIN — SULFAMETHOXAZOLE AND TRIMETHOPRIM 1 TABLET: 400; 80 TABLET ORAL at 08:49

## 2018-12-30 RX ADMIN — SODIUM CHLORIDE 1000 MG: 9 INJECTION, SOLUTION INTRAVENOUS at 16:55

## 2018-12-30 RX ADMIN — MYCOPHENOLATE MOFETIL 1000 MG: 250 CAPSULE ORAL at 18:15

## 2018-12-30 RX ADMIN — FUROSEMIDE 20 MG: 10 INJECTION, SOLUTION INTRAVENOUS at 08:48

## 2018-12-30 RX ADMIN — AMLODIPINE BESYLATE 10 MG: 10 TABLET ORAL at 08:49

## 2018-12-30 RX ADMIN — ASPIRIN 325 MG: 325 TABLET, DELAYED RELEASE ORAL at 08:49

## 2018-12-30 RX ADMIN — PIPERACILLIN SODIUM AND TAZOBACTAM SODIUM 3.38 G: 3; .375 INJECTION, POWDER, LYOPHILIZED, FOR SOLUTION INTRAVENOUS at 08:48

## 2018-12-30 RX ADMIN — PIPERACILLIN SODIUM AND TAZOBACTAM SODIUM 3.38 G: 3; .375 INJECTION, POWDER, LYOPHILIZED, FOR SOLUTION INTRAVENOUS at 02:44

## 2018-12-30 RX ADMIN — INSULIN GLARGINE 5 UNITS: 100 INJECTION, SOLUTION SUBCUTANEOUS at 08:49

## 2018-12-30 RX ADMIN — VALGANCICLOVIR 450 MG: 450 TABLET, FILM COATED ORAL at 08:50

## 2018-12-30 RX ADMIN — OXYCODONE HYDROCHLORIDE 5 MG: 5 TABLET ORAL at 05:29

## 2018-12-30 RX ADMIN — PREDNISONE 5 MG: 5 TABLET ORAL at 08:50

## 2018-12-30 RX ADMIN — PIPERACILLIN SODIUM AND TAZOBACTAM SODIUM 3.38 G: 3; .375 INJECTION, POWDER, LYOPHILIZED, FOR SOLUTION INTRAVENOUS at 15:41

## 2018-12-30 RX ADMIN — ALFUZOSIN HYDROCHLORIDE 10 MG: 10 TABLET, EXTENDED RELEASE ORAL at 08:50

## 2018-12-30 RX ADMIN — METOPROLOL TARTRATE 75 MG: 50 TABLET ORAL at 20:05

## 2018-12-30 RX ADMIN — SENNOSIDES AND DOCUSATE SODIUM 2 TABLET: 8.6; 5 TABLET ORAL at 20:05

## 2018-12-30 ASSESSMENT — ACTIVITIES OF DAILY LIVING (ADL)
ADLS_ACUITY_SCORE: 13

## 2018-12-30 ASSESSMENT — MIFFLIN-ST. JEOR: SCORE: 1639.48

## 2018-12-30 NOTE — PROGRESS NOTES
Transplant Surgery  Inpatient Daily Progress Note  12/30/2018    Assessment & Plan: 64 yo M hx Laennec's cirrhosis with HCC. S/p DBD OLT with right inguinal hernia repair with biologic mesh 12/22/18. Notable for small arterial anastomosis (donor common hepatic with atherosclerosis to recipient replaced right hepatic).    Graft function: POD 8. AP and ALT increased today, monitor. Remainder of LFTs decreased. 12/26 US: small fluid collection, otherwise normal. MRCP concern for biliary anastomotic narrowing. ERCP with minimal stricture- stented. Biliary sphincterotomy also performed. If AP and ALT elevated tomorrow, may need US/biopsy with persistently low tac levels. On steroid and MMF  Immunosuppression management: Induction with steroid pulse.  Methylpred:  Taper per protocol, continue pred 5 until tac level at goal  MMF: 1000mg BID  Tacro: Goal level 10-15. Level 4.7. Anticipate increase in level in 1-2 weeks after rifampin is fully metabolized (last dose 12/20). Tac level every 48 hours. Increase to 7 BID. Will also fall when antifungal stopped- monitor   Complexity of management: High. Contributing factors: induction  Hematology:   Anemia: Hgb stable  Vascular prophylaxis: Due to small HA anastomosis. Received dextran X48 hrs, on aspirin 325.   Cardiorespiratory:   Post operative atrial fibrillation with RVR: On 12/25. Started on scheduled metoprolol and amio bolus/drip. Trops low/negative. 12/25 Echo: severely dilated left atrium, mild to moderate LVH, preserved EF. Converted back to NSR 12/25 PM. Run of aberrant AF vs VT (14 beats) yesterday. Cardiology consulted. Recommend continue rate control with metoprolol 75 mg BID, no indication for anticoagulation. NSVT - 2 short runs documented on tele. Cards recommended no further cardiac testing or management.   Hypoxia/dyspnea/reactive airways: Mild hypoxia and wheezing, improved. Continue PRN nebs and diuresis.  HTN: Amlodipine 10mg and metoprolol to 75mg oral  BID. Pressures controlled  GI/Nutrition: eating well.  Constipation: improving- passing flatus. BM+, aggressive regimen  Endocrine:   Hyperglycemia: A1c 5.8% on admission. Steroid induced hyperglycemia. Novolog SSI.  Fluid/Electrolytes:   Hypervolemia: continue BID lasix for swelling/weight gain. Slight increase in Cr- add albumin prior to all lasix dosing   :   Hematuria: Developed 12/26 with manipulation of cota. Resolved. Remove cota  Bladder spasms: No c/o today. B&O supp ordered.  Penile edema: continue diuresis. Support sling while ambulating  Infectious disease: Afebrile, WBC 5.6  Latent TB: Completed 4 month course of rifampin prior to admission (8/21-12/20).  Prophylaxis: DVT, fall, GI, continue diflucan given herniorraphy with mesh. Stop Vanco.  Disposition: 7A    Medical Decision Making: Medium    IRVIN/Fellow/Resident Provider: Sebastián Agudelo, Fellow    Faculty: Shaila De La Cruz MD    __________________________________________________________________  Transplant History: Admitted 12/21/2018 for DDLT and RIH repair (XenMatrix mesh).   The patient has a history of liver failure due to Laennec's cirrhosis with HCC.    12/22/2018 (Liver), Postoperative day: 8     Interval History: No complaints. Eating well, abdominal distention improving, but still distended. Having some diarrhea. Increasing bowel regimen. Swelling in legs/scrotum improving but persists- continue diuresis with lasix and albumin.   ROS:   A 10-point review of systems was negative except as noted above.    Curent Meds:    alfuzosin ER  10 mg Oral Daily with breakfast     amLODIPine  10 mg Oral Daily     aspirin  325 mg Oral Daily     bisacodyl  10 mg Rectal Once     insulin aspart  1-10 Units Subcutaneous TID AC     insulin aspart  1-7 Units Subcutaneous At Bedtime     insulin aspart   Subcutaneous TID AC     insulin glargine  5 Units Subcutaneous Daily     metoprolol tartrate  75 mg Oral BID     multivitamin w/minerals  1 tablet Oral  "Daily     mycophenolate  1,000 mg Oral BID IS     omeprazole  20 mg Oral QAM AC     piperacillin-tazobactam  3.375 g Intravenous Q6H     polyethylene glycol  17 g Oral Daily     predniSONE  5 mg Oral Daily     senna-docusate  2 tablet Oral or Feeding Tube BID     sodium chloride (PF)  3 mL Intravenous Q8H     sulfamethoxazole-trimethoprim  1 tablet Oral or Feeding Tube Daily     tacrolimus  6 mg Oral BID IS     valGANciclovir  450 mg Oral Daily       Physical Exam:     Admit Weight: 84.6 kg (186 lb 9.6 oz)    Current Vitals:   /68 (BP Location: Left arm)   Pulse 73   Temp 99.6  F (37.6  C) (Oral)   Resp 16   Ht 1.702 m (5' 7\")   Wt 89.6 kg (197 lb 8 oz)   SpO2 95%   BMI 30.93 kg/m      Vital sign ranges:    Temp:  [98  F (36.7  C)-99.6  F (37.6  C)] 99.6  F (37.6  C)  Heart Rate:  [61-75] 72  Resp:  [16-20] 16  BP: (113-147)/(51-68) 118/68  SpO2:  [95 %-97 %] 95 %  Patient Vitals for the past 24 hrs:   BP Temp Temp src Heart Rate Resp SpO2 Weight   12/30/18 1208 118/68 99.6  F (37.6  C) Oral 72 16 95 % --   12/30/18 0959 -- -- -- -- -- -- 89.6 kg (197 lb 8 oz)   12/30/18 0740 147/64 98  F (36.7  C) Oral 75 18 96 % --   12/30/18 0300 129/66 99.1  F (37.3  C) Oral 67 18 96 % --   12/29/18 2313 117/62 98.6  F (37  C) Oral 61 20 97 % --   12/29/18 1941 113/51 98.1  F (36.7  C) Oral 75 16 95 % --   12/29/18 1500 117/54 98.2  F (36.8  C) Oral 71 16 97 % --     General Appearance: in no apparent distress.   Skin: normal, warm, dry, No rashes, induration, or jaundice  Heart: RRR  Lungs: B/l diminished, without wheezes  Abdomen: abd soft, slightly distended. Incision c/d/i, staples in place. KARINE X2- serosang, ~500 out in last 24 hrs from abdominal drain, 5 from hernia drain- will remove.   : The genitalia are edematous   Extremities: edema: present bilaterally. 2+. There is no skin breakdown.  Neurologic: awake, alert and oriented x4. Tremor absent.  PIV only      Data:   CMP  Recent Labs   Lab 12/30/18  0549 " 12/29/18  0636    138   POTASSIUM 3.9 4.1   CHLORIDE 106 105   CO2 26 26   * 169*   BUN 31* 27   CR 1.24 1.13   GFRESTIMATED 60* 68   GFRESTBLACK 70 78   YELENA 7.3* 7.4*   MAG 2.0 2.1   PHOS 2.7 4.3   ALBUMIN 1.7* 1.6*   BILITOTAL 0.8 1.1   ALKPHOS 1,088* 780*   * 70*   * 212*     CBC  Recent Labs   Lab 12/30/18  0549 12/29/18  0636   HGB 7.8* 7.8*   WBC 4.8 4.3    99*     COAGS  No lab results found in last 7 days.    Invalid input(s): XA   Urinalysis  Recent Labs   Lab Test 12/25/18  0732 12/22/18  0034  07/19/18  1133   COLOR Yellow Yellow   < > Yellow   APPEARANCE Slightly Cloudy Clear   < > Clear   URINEGLC Negative Negative   < > Negative   URINEBILI Small* Negative   < > Negative   URINEKETONE Negative Negative   < > Negative   SG 1.021 1.012   < > 1.009   UBLD Moderate* Trace*   < > Negative   URINEPH 6.0 6.5   < > 8.0*   PROTEIN 30* Negative   < > Negative   NITRITE Negative Negative   < > Negative   LEUKEST Negative Negative   < > Negative   RBCU 7* 3*   < >  --    WBCU 1 1   < >  --    UTPG  --   --   --  Unable to calculate due to low value    < > = values in this interval not displayed.     Virology:  Hepatitis C Antibody   Date Value Ref Range Status   12/22/2018 Nonreactive NR^Nonreactive Final     Comment:     Assay performance characteristics have not been established for newborns,   infants, and children

## 2018-12-30 NOTE — PLAN OF CARE
"/66 (BP Location: Left arm)   Pulse 73   Temp 99.1  F (37.3  C) (Oral)   Resp 18   Ht 1.702 m (5' 7\")   Wt 89.8 kg (197 lb 14.4 oz)   SpO2 96%   BMI 31.00 kg/m      1900 - 0700: Tmax 99.1. HR 60-70's. OVSS on RA. Alert and oriented. Up SBA. Ambulated in halls x1. Received 5mg oxy x1 for abdominal pain. Denies nausea.  and 213. R PIV infiltrated and removed. R arm also slightly edematous but no redness or c/o pain - day team to assess in AM. Arm elevated on pillows. L PIV leaky and removed. New L PIV placed, currently TKO for abx. KARINE #1 with scant OP. KARINE #2 with moderate amounts of pink/serosang OP. Cronin with adequate amounts of yellow OP, received 20mg IV lasix. Pt still with severe penile and scrotal edema, sling in place. Incision stapled and IVETT, scant drainage - incision cleaned with wound cleanser. Regular diet with good appetite, ate 100% of dinner. Carb coverage given per orders. Loose BM x2 this shift. Resting between cares. Will continue to monitor and notify of any changes.   "

## 2018-12-30 NOTE — PLAN OF CARE
"[id number]    /64 (BP Location: Left arm)   Pulse 73   Temp 98  F (36.7  C) (Oral)   Resp 16   Ht 1.702 m (5' 7\")   Wt 89.6 kg (197 lb 8 oz)   SpO2 98%   BMI 30.93 kg/m     VS: AVSS and afebrile. B, 217, and 181, order changed to every four hour checks, if BG >200 x2 then start insulin drip, pump in room and syringes ordered up from pharmacy. LABS: Liver labs elevated, plan for US of liver, completed and pt will go for liver biopsy tomorrow. 1000mg Solumderol also given. Pain: denies. Nausea: denies. Diet: Regular will be NPO at 0000 for liver biopsy tomorrow, pt is aware. LDA: PIV infusing with TKO. GTT: none. GI/: Cronin was removed, patient has yet to void, BM x1 today (loose). Skin: Incision stapled on abd and inguinal area, KARINE x2 serous out in larger bulb, little to no output in smaller KARINE, dressings changed. Mobility: Assist x1-SBA. Education: Watching BG, changes in medications and reasons for the liver biopsy. Tests/Procedures: Liver US. Plan: to see results from biopsy, monitor sugars and bring Tac level to goal of 10-15. All care explained and questions answered. Will continue to monitor and notify team of changes.     "

## 2018-12-30 NOTE — CONSULTS
"Diabetes/Hyperglycemia Management Consult    Chief Complaint post transplant hyperglycemia  Consult requested by: Ivette Cardenas PA-C, attending: Dr. Echevarria  History of Present Illness History obtained from chart review and pt using professional  via phone.  Mr. Loy Limon is a 64 yo man with a history of Laennec's cirrhosis with HCC, portal hypertension, latent TB, now s/p disease donor liver transplant on 12/22/2018.  Claribel had no diagnosis of diabetes prior to transplant but labs consistent with prediabetes (hemoglobin A1c 5.8% on admission).  He was started on IV insulin infusion shortly after admission at same time that he received first high dose methylprednisolone.  He finished last dose of methylpred on 12/25 and IV insulin infusion was discontinued the day prior (no basal insulin, just aspart ssi).  Glucoses ran in the 200s.  He was started on prednisone 25mg on 12/26, this dose was decreased to 10 mg on 12/27 and 5mg daily since 12/28 (kept on this due to low tac level).  He received dexamethasone 6mg on the evening on 12/28/18.  His glucoses have remained mostly in the high 100s-200s (spike to high 300s following dex dose).  He was started on glargine 5 units last night with minimal improvement to fasting glucose.  glargine 5 units was given this morning, and with a total of 10 units of glargine on board and aspart 1unit/10g his glucose was still up to low 200s midday.  I have now learned that pt will receive a dose of 1000mg of methylpred this evening b/c of concern for rejection and he will be NPO at [facility] for liver biopsy tomorrow (unknown what time biopsy will occur).     Claribel's appetite is good.  He feels like his supplement (Boost plus) is causing diarrhea.  Aside from this he noted that he is worried about these high glucoses \"b/c they could prevent my liver from healing\".    Recent Labs   Lab 12/30/18  1214 12/30/18  0748 12/30/18  0549 12/30/18  0242 12/29/18  2231 12/29/18  1709 " 12/29/18  1429  12/29/18  0636  12/28/18  0556  12/27/18  0512  12/26/18  0523  12/25/18  0632   GLC  --   --  181*  --   --   --   --   --  169*  --  169*  --  153*  --  218*  --  221*   * 185*  --  213* 223* 171* 371*   < >  --    < >  --    < >  --    < >  --    < >  --     < > = values in this interval not displayed.         Diabetes Type:  Pre diabetes pre-transplant, now worsening hyperglycemia post transplant related to steroids and other immunosuppression meds.  Diabetes Duration: diagnosis this admission  Usual Diabetes Regimen:   none  Ability to [place] Prescribed Regimen: unknown- motivated to control glucose for proper healing.  Diabetes Control:   Lab Results   Component Value Date    A1C 5.8 12/22/2018       Factors Impacting Glucose Control: improving po intake with supplements between meals, prednisone 5mg daily now getting methylpred 1000mg x 1, NPO at [facility] [place].      Review of Systems  10 point ROS completed with pertinent positives and negatives noted in the HPI    Past medical, family and social histories are reviewed and updated.    Past Medical History  Past Medical History:   Diagnosis Date     Cancer (H)     hepatocellualr carcinoma     Cirrhosis of liver (H) 5/8/2018     Enlarged prostate      Inguinal hernia      Liver lesion 5/8/2018       Family History  Family History   Problem Relation Age of Onset     Liver Disease Brother    type 2 diabetes- 2 sisters, 1 brother    Social History  Social History     Socioeconomic History     Marital status:      Spouse name: None     Number of children: None     Years of education: None     Highest education level: None   Social Needs     Financial resource strain: None     Food insecurity - worry: None     Food insecurity - inability: None     Transportation needs - medical: None     Transportation needs - non-medical: None   Occupational History     None   Tobacco Use     Smoking status: Former Smoker     Packs/day: 0.03     Years:  "20.00     Pack years: 0.60     Types: Cigarettes, Cigars     Start date: 1970     Last attempt to quit: 3/14/2018     Years since quittin.7     Smokeless tobacco: Never Used     Tobacco comment: Quit smoking 2018, one cigarette after dinner   Substance and Sexual Activity     Alcohol use: No     Comment: Quit drinking 2018     Drug use: No     Sexual activity: None   Other Topics Concern     Parent/sibling w/ CABG, MI or angioplasty before 65F 55M? Not Asked   Social History Narrative    Born in [place], came to [place] years ago.     Has worked in [employer], trimming meats         Physical Exam  /64 (BP Location: Left arm)   Pulse 73   Temp 98  F (36.7  C) (Oral)   Resp 16   Ht 1.702 m (5' 7\")   Wt 89.6 kg (197 lb 8 oz)   SpO2 98%   BMI 30.93 kg/m      General:  pleasant man, resting in bed, in no distress. Wife is present.  Large fruit plate at bedside.  HEENT: NC/AT, PER and anicteric, non-injected, oral mucous membranes moist.   Lungs: unlabored respiration, no cough  Skin: warm and dry, no obvious lesions  MSK:  fluid movement of upper extremities  Lymp: bilateral LE edema, legs wrapped.  Mental status:  alert, oriented x3, communicating clearly  Psych:  calm, even mood    Laboratory  Recent Labs   Lab Test 18  0549 18  0636    138   POTASSIUM 3.9 4.1   CHLORIDE 106 105   CO2 26 26   ANIONGAP 8 7   * 169*   BUN 31* 27   CR 1.24 1.13   YELENA 7.3* 7.4*     CBC RESULTS:   Recent Labs   Lab Test 18  0549   WBC 4.8   RBC 2.47*   HGB 7.8*   HCT 23.9*   MCV 97   MCH 31.6   MCHC 32.6   RDW 14.1          Liver Function Studies -   Recent Labs   Lab Test 18  0549   PROTTOTAL 5.1*   ALBUMIN 1.7*   BILITOTAL 0.8   ALKPHOS 1,088*   *   *       Active Medications  Current Facility-Administered Medications   Medication     alfuzosin ER (UROXATRAL) 24 hr tablet 10 mg     amLODIPine (NORVASC) tablet 10 mg     aspirin (ASA) EC " tablet 325 mg     bisacodyl (DULCOLAX) Suppository 10 mg     bisacodyl (DULCOLAX) Suppository 10 mg     glucose gel 15-30 g    Or     dextrose 50 % injection 25-50 mL    Or     glucagon injection 1 mg     insulin 1 unit/mL in saline (NovoLIN, HumuLIN Regular) drip - ADULT IV Infusion     insulin aspart (NovoLOG) inj (RAPID ACTING)     insulin aspart (NovoLOG) inj (RAPID ACTING)     insulin aspart (NovoLOG) inj (RAPID ACTING)     insulin aspart (NovoLOG) inj (RAPID ACTING)     [START ON 12/31/2018] insulin glargine (LANTUS PEN) injection 15 Units     ipratropium - albuterol 0.5 mg/2.5 mg/3 mL (DUONEB) neb solution 3 mL     magic mouthwash suspension (diphenhydramine, lidocaine, aluminum-magnesium & simethicone)     methylPREDNISolone sodium succinate (solu-MEDROL) 1,000 mg in sodium chloride 0.9 % 250 mL intermittent infusion     metoprolol tartrate (LOPRESSOR) tablet 75 mg     multivitamin w/minerals (THERA-VIT-M) tablet 1 tablet     mycophenolate (GENERIC EQUIVALENT) capsule 1,000 mg     omeprazole (priLOSEC) CR capsule 20 mg     ondansetron (ZOFRAN) injection 4 mg     oxyCODONE (ROXICODONE) tablet 5-10 mg     piperacillin-tazobactam (ZOSYN) 3.375 g vial to attach to  mL bag     polyethylene glycol (MIRALAX/GLYCOLAX) Packet 17 g     predniSONE (DELTASONE) tablet 5 mg     senna-docusate (SENOKOT-S/PERICOLACE) 8.6-50 MG per tablet 2 tablet     sodium chloride (PF) 0.9% PF flush 3 mL     sodium chloride (PF) 0.9% PF flush 3 mL     sodium chloride (PF) 0.9% PF flush 3 mL     sulfamethoxazole-trimethoprim (BACTRIM/SEPTRA) 400-80 MG per tablet 1 tablet     tacrolimus (GENERIC EQUIVALENT) capsule 8 mg     valGANciclovir (VALCYTE) tablet 450 mg     No current outpatient medications on file.       Current Diet  Orders Placed This Encounter      Regular Diet Adult      NPO per Anesthesia Guidelines for Procedure/Surgery Except for: Meds        Assessment  Mr. Loy Limon is a 64 yo man with a history of Laennec's  cirrhosis with HCC, portal hypertension, latent TB, now s/p disease donor liver transplant on 12/22/2018.  Claribel had no diagnosis of diabetes prior to transplant but labs consistent with prediabetes (hemoglobin A1c 5.8% on admission).  Now with increased insulin needs while on prednisone, as well as tacrolimus and mycophenolate.    Anticipate worsening hyperglycemia this evening with methylpred 1000mg.      Plan  -glargine 5 units x 1 this afternoon and morning dose increased to 15 units qAM, however, anticipate IV insulin infusion will need to start before then.  -continuous prn order for IV insulin infusion to start once glucose is >200 x 2.  -aspart for meals and snacks: increased to 1unit/7g CHO starting now.  -aspart for correction: high intensity with frequency now changed to q4h this evening.  -monitor glucose q4h, hourly once IV insulin is started.    Pt will need Dm education prior to discharge- new to insulin.    We will continue to follow.    Amie Koenig PA-C [phone]  Diabetes Management Team Job Code [id number]  I spent a total of 80 minutes bedside and on the inpatient unit managing the glycemic care of Loy Limon. Over 50% of my time on the unit was spent counseling the patient and/or coordinating care regarding glucose management in context of hyperglycemia post transplant, now receiving high dose steroids tonight and planned NPO at [facility] for biopsy tomorrow.  See note for details.

## 2018-12-30 NOTE — PLAN OF CARE
"[id number]    /54 (BP Location: Left arm)   Pulse 73   Temp 98.2  F (36.8  C) (Oral)   Resp 16   Ht 1.702 m (5' 7\")   Wt 89.8 kg (197 lb 14.4 oz)   SpO2 97%   BMI 31.00 kg/m     VS: AVSS and afebrile. B, 371, and 171 (team notified of 371, Lantus 5 units ordered). LABS: Liver labs trending down. Pain: denies Nausea: denies. Diet: regular with good PO intake. LDA: PIV x2 SL'd. GTT: none. GI/: Cronin with adequate ouptut, KARINE x2 Larger one with output smaller one with little to no output. Skin: Incision stapled open to air and cleaned, inguinal hernia site, open to air stapled, KARINE dressings changed sites are slightly reddened. Mobility: Assist x1-SBA Walk x2-3 this shift. Education: medication education and education on BG monitoring. Tests/Procedures: none. Plan: PT and OT worked with patient, patient continue to work with therapy, continue to monitor labs, and continue to monitor pain. Lab book updated All care explained and questions answered. Will continue to monitor and notify team of changes.     "

## 2018-12-30 NOTE — PROGRESS NOTES
Calorie Count    Intake recorded for 12/29/2018 Total Kcals: 2659 Total Protein: 124g    Kcals From Hospital Food: 2659    Protein: 124g    Kcals From Outside Food (average): 0   Protein: 0g    # Meals Recorded: 3 meals ordered; 2 recorded (first - 100% 8oz orange juice, fresh fruit cup, omelet w/ cheddar, ham, spinach)  (second - 100% 1 1% milk, pineapple, salazar cheeseburger on whole grain bun w/ American cheese, fries, cream of potato soup)    # Supplements Recorded: 100% 3 Boost Plus

## 2018-12-31 ENCOUNTER — APPOINTMENT (OUTPATIENT)
Dept: OCCUPATIONAL THERAPY | Facility: CLINIC | Age: 63
DRG: 005 | End: 2018-12-31
Attending: TRANSPLANT SURGERY
Payer: MEDICARE

## 2018-12-31 ENCOUNTER — APPOINTMENT (OUTPATIENT)
Dept: INTERVENTIONAL RADIOLOGY/VASCULAR | Facility: CLINIC | Age: 63
DRG: 005 | End: 2018-12-31
Attending: RADIOLOGY PRACTITIONER ASSISTANT
Payer: MEDICARE

## 2018-12-31 LAB
ALBUMIN SERPL-MCNC: 1.8 G/DL (ref 3.4–5)
ALP SERPL-CCNC: 922 U/L (ref 40–150)
ALT SERPL W P-5'-P-CCNC: 259 U/L (ref 0–70)
ANION GAP SERPL CALCULATED.3IONS-SCNC: 7 MMOL/L (ref 3–14)
AST SERPL W P-5'-P-CCNC: 47 U/L (ref 0–45)
BACTERIA SPEC CULT: NO GROWTH
BACTERIA SPEC CULT: NO GROWTH
BILIRUB DIRECT SERPL-MCNC: 0.5 MG/DL (ref 0–0.2)
BILIRUB SERPL-MCNC: 0.8 MG/DL (ref 0.2–1.3)
BUN SERPL-MCNC: 26 MG/DL (ref 7–30)
CALCIUM SERPL-MCNC: 7.6 MG/DL (ref 8.5–10.1)
CHLORIDE SERPL-SCNC: 108 MMOL/L (ref 94–109)
CO2 SERPL-SCNC: 26 MMOL/L (ref 20–32)
CREAT SERPL-MCNC: 0.97 MG/DL (ref 0.66–1.25)
ERYTHROCYTE [DISTWIDTH] IN BLOOD BY AUTOMATED COUNT: 14.1 % (ref 10–15)
GFR SERPL CREATININE-BSD FRML MDRD: 82 ML/MIN/{1.73_M2}
GLUCOSE BLDC GLUCOMTR-MCNC: 121 MG/DL (ref 70–99)
GLUCOSE BLDC GLUCOMTR-MCNC: 122 MG/DL (ref 70–99)
GLUCOSE BLDC GLUCOMTR-MCNC: 125 MG/DL (ref 70–99)
GLUCOSE BLDC GLUCOMTR-MCNC: 126 MG/DL (ref 70–99)
GLUCOSE BLDC GLUCOMTR-MCNC: 127 MG/DL (ref 70–99)
GLUCOSE BLDC GLUCOMTR-MCNC: 128 MG/DL (ref 70–99)
GLUCOSE BLDC GLUCOMTR-MCNC: 135 MG/DL (ref 70–99)
GLUCOSE BLDC GLUCOMTR-MCNC: 136 MG/DL (ref 70–99)
GLUCOSE BLDC GLUCOMTR-MCNC: 136 MG/DL (ref 70–99)
GLUCOSE BLDC GLUCOMTR-MCNC: 138 MG/DL (ref 70–99)
GLUCOSE BLDC GLUCOMTR-MCNC: 144 MG/DL (ref 70–99)
GLUCOSE BLDC GLUCOMTR-MCNC: 151 MG/DL (ref 70–99)
GLUCOSE BLDC GLUCOMTR-MCNC: 157 MG/DL (ref 70–99)
GLUCOSE BLDC GLUCOMTR-MCNC: 158 MG/DL (ref 70–99)
GLUCOSE BLDC GLUCOMTR-MCNC: 169 MG/DL (ref 70–99)
GLUCOSE BLDC GLUCOMTR-MCNC: 170 MG/DL (ref 70–99)
GLUCOSE BLDC GLUCOMTR-MCNC: 189 MG/DL (ref 70–99)
GLUCOSE BLDC GLUCOMTR-MCNC: 194 MG/DL (ref 70–99)
GLUCOSE BLDC GLUCOMTR-MCNC: 196 MG/DL (ref 70–99)
GLUCOSE BLDC GLUCOMTR-MCNC: 208 MG/DL (ref 70–99)
GLUCOSE BLDC GLUCOMTR-MCNC: 222 MG/DL (ref 70–99)
GLUCOSE BLDC GLUCOMTR-MCNC: 230 MG/DL (ref 70–99)
GLUCOSE SERPL-MCNC: 150 MG/DL (ref 70–99)
HCT VFR BLD AUTO: 25 % (ref 40–53)
HGB BLD-MCNC: 8.2 G/DL (ref 13.3–17.7)
INR PPP: 1.14 (ref 0.86–1.14)
Lab: NORMAL
Lab: NORMAL
MAGNESIUM SERPL-MCNC: 1.9 MG/DL (ref 1.6–2.3)
MCH RBC QN AUTO: 31.8 PG (ref 26.5–33)
MCHC RBC AUTO-ENTMCNC: 32.8 G/DL (ref 31.5–36.5)
MCV RBC AUTO: 97 FL (ref 78–100)
PHOSPHATE SERPL-MCNC: 3 MG/DL (ref 2.5–4.5)
PLATELET # BLD AUTO: 176 10E9/L (ref 150–450)
POTASSIUM SERPL-SCNC: 4.3 MMOL/L (ref 3.4–5.3)
PROT SERPL-MCNC: 5.3 G/DL (ref 6.8–8.8)
RBC # BLD AUTO: 2.58 10E12/L (ref 4.4–5.9)
SODIUM SERPL-SCNC: 141 MMOL/L (ref 133–144)
SPECIMEN SOURCE: NORMAL
SPECIMEN SOURCE: NORMAL
TACROLIMUS BLD-MCNC: 7.7 UG/L (ref 5–15)
TME LAST DOSE: NORMAL H
WBC # BLD AUTO: 4.3 10E9/L (ref 4–11)

## 2018-12-31 PROCEDURE — 80076 HEPATIC FUNCTION PANEL: CPT | Performed by: INTERNAL MEDICINE

## 2018-12-31 PROCEDURE — 80048 BASIC METABOLIC PNL TOTAL CA: CPT | Performed by: INTERNAL MEDICINE

## 2018-12-31 PROCEDURE — A9270 NON-COVERED ITEM OR SERVICE: HCPCS | Mod: GY | Performed by: PHYSICIAN ASSISTANT

## 2018-12-31 PROCEDURE — 25000131 ZZH RX MED GY IP 250 OP 636 PS 637: Mod: GY | Performed by: TRANSPLANT SURGERY

## 2018-12-31 PROCEDURE — 85027 COMPLETE CBC AUTOMATED: CPT | Performed by: INTERNAL MEDICINE

## 2018-12-31 PROCEDURE — 80197 ASSAY OF TACROLIMUS: CPT | Performed by: PHYSICIAN ASSISTANT

## 2018-12-31 PROCEDURE — 25000128 H RX IP 250 OP 636: Performed by: PHYSICIAN ASSISTANT

## 2018-12-31 PROCEDURE — 97530 THERAPEUTIC ACTIVITIES: CPT | Mod: GO

## 2018-12-31 PROCEDURE — 25000125 ZZHC RX 250: Performed by: PHYSICIAN ASSISTANT

## 2018-12-31 PROCEDURE — 25000128 H RX IP 250 OP 636: Performed by: TRANSPLANT SURGERY

## 2018-12-31 PROCEDURE — 25000132 ZZH RX MED GY IP 250 OP 250 PS 637: Mod: GY | Performed by: SURGERY

## 2018-12-31 PROCEDURE — 40000133 ZZH STATISTIC OT WARD VISIT

## 2018-12-31 PROCEDURE — A9270 NON-COVERED ITEM OR SERVICE: HCPCS | Mod: GY | Performed by: SURGERY

## 2018-12-31 PROCEDURE — 36415 COLL VENOUS BLD VENIPUNCTURE: CPT | Performed by: PHYSICIAN ASSISTANT

## 2018-12-31 PROCEDURE — 40000556 ZZH STATISTIC PERIPHERAL IV START W US GUIDANCE

## 2018-12-31 PROCEDURE — 25000132 ZZH RX MED GY IP 250 OP 250 PS 637: Mod: GY | Performed by: NURSE PRACTITIONER

## 2018-12-31 PROCEDURE — 25000132 ZZH RX MED GY IP 250 OP 250 PS 637: Mod: GY | Performed by: PHYSICIAN ASSISTANT

## 2018-12-31 PROCEDURE — 88313 SPECIAL STAINS GROUP 2: CPT | Performed by: PHYSICIAN ASSISTANT

## 2018-12-31 PROCEDURE — 76942 ECHO GUIDE FOR BIOPSY: CPT

## 2018-12-31 PROCEDURE — A9270 NON-COVERED ITEM OR SERVICE: HCPCS | Mod: GY | Performed by: NURSE PRACTITIONER

## 2018-12-31 PROCEDURE — 25000131 ZZH RX MED GY IP 250 OP 636 PS 637: Mod: GY | Performed by: SURGERY

## 2018-12-31 PROCEDURE — 83735 ASSAY OF MAGNESIUM: CPT | Performed by: INTERNAL MEDICINE

## 2018-12-31 PROCEDURE — 88307 TISSUE EXAM BY PATHOLOGIST: CPT | Performed by: PHYSICIAN ASSISTANT

## 2018-12-31 PROCEDURE — 97535 SELF CARE MNGMENT TRAINING: CPT | Mod: GO

## 2018-12-31 PROCEDURE — 12000024 ZZH R&B TRANSPLANT CRITICAL

## 2018-12-31 PROCEDURE — 00000146 ZZHCL STATISTIC GLUCOSE BY METER IP

## 2018-12-31 PROCEDURE — 25000125 ZZHC RX 250: Performed by: NURSE PRACTITIONER

## 2018-12-31 PROCEDURE — 85610 PROTHROMBIN TIME: CPT | Performed by: PHYSICIAN ASSISTANT

## 2018-12-31 PROCEDURE — T1013 SIGN LANG/ORAL INTERPRETER: HCPCS | Mod: U3

## 2018-12-31 PROCEDURE — 0FB03ZX EXCISION OF LIVER, PERCUTANEOUS APPROACH, DIAGNOSTIC: ICD-10-PCS | Performed by: PHYSICIAN ASSISTANT

## 2018-12-31 PROCEDURE — 84100 ASSAY OF PHOSPHORUS: CPT | Performed by: INTERNAL MEDICINE

## 2018-12-31 PROCEDURE — 36415 COLL VENOUS BLD VENIPUNCTURE: CPT | Performed by: INTERNAL MEDICINE

## 2018-12-31 RX ORDER — NICOTINE POLACRILEX 4 MG
15-30 LOZENGE BUCCAL
Status: DISCONTINUED | OUTPATIENT
Start: 2018-12-31 | End: 2019-01-07 | Stop reason: HOSPADM

## 2018-12-31 RX ORDER — SODIUM CHLORIDE 9 MG/ML
INJECTION, SOLUTION INTRAVENOUS CONTINUOUS
Status: DISCONTINUED | OUTPATIENT
Start: 2018-12-31 | End: 2019-01-03

## 2018-12-31 RX ORDER — DEXTROSE MONOHYDRATE 25 G/50ML
25-50 INJECTION, SOLUTION INTRAVENOUS
Status: CANCELLED | OUTPATIENT
Start: 2018-12-31

## 2018-12-31 RX ORDER — DEXTROSE MONOHYDRATE 25 G/50ML
25-50 INJECTION, SOLUTION INTRAVENOUS
Status: DISCONTINUED | OUTPATIENT
Start: 2018-12-31 | End: 2019-01-07 | Stop reason: HOSPADM

## 2018-12-31 RX ORDER — NICOTINE POLACRILEX 4 MG
15-30 LOZENGE BUCCAL
Status: CANCELLED | OUTPATIENT
Start: 2018-12-31

## 2018-12-31 RX ORDER — LIDOCAINE 40 MG/G
CREAM TOPICAL
Status: CANCELLED | OUTPATIENT
Start: 2018-12-31

## 2018-12-31 RX ADMIN — METOPROLOL TARTRATE 75 MG: 50 TABLET ORAL at 19:52

## 2018-12-31 RX ADMIN — HUMAN INSULIN 3 UNITS/HR: 100 INJECTION, SOLUTION SUBCUTANEOUS at 06:02

## 2018-12-31 RX ADMIN — OXYCODONE HYDROCHLORIDE 5 MG: 5 TABLET ORAL at 22:30

## 2018-12-31 RX ADMIN — SENNOSIDES AND DOCUSATE SODIUM 2 TABLET: 8.6; 5 TABLET ORAL at 19:52

## 2018-12-31 RX ADMIN — OXYCODONE HYDROCHLORIDE 5 MG: 5 TABLET ORAL at 21:05

## 2018-12-31 RX ADMIN — MYCOPHENOLATE MOFETIL 1000 MG: 250 CAPSULE ORAL at 08:20

## 2018-12-31 RX ADMIN — PIPERACILLIN SODIUM AND TAZOBACTAM SODIUM 3.38 G: 3; .375 INJECTION, POWDER, LYOPHILIZED, FOR SOLUTION INTRAVENOUS at 08:21

## 2018-12-31 RX ADMIN — SULFAMETHOXAZOLE AND TRIMETHOPRIM 1 TABLET: 400; 80 TABLET ORAL at 08:20

## 2018-12-31 RX ADMIN — CLOTRIMAZOLE 1 TROCHE: 10 LOZENGE ORAL at 19:52

## 2018-12-31 RX ADMIN — SODIUM CHLORIDE 1000 MG: 0.9 INJECTION, SOLUTION INTRAVENOUS at 12:57

## 2018-12-31 RX ADMIN — SODIUM CHLORIDE: 9 INJECTION, SOLUTION INTRAVENOUS at 12:58

## 2018-12-31 RX ADMIN — HUMAN INSULIN 5.5 UNITS/HR: 100 INJECTION, SOLUTION SUBCUTANEOUS at 16:11

## 2018-12-31 RX ADMIN — VALGANCICLOVIR 450 MG: 450 TABLET, FILM COATED ORAL at 08:20

## 2018-12-31 RX ADMIN — HUMAN INSULIN 2 UNITS/HR: 100 INJECTION, SOLUTION SUBCUTANEOUS at 00:42

## 2018-12-31 RX ADMIN — TACROLIMUS 8 MG: 5 CAPSULE ORAL at 18:47

## 2018-12-31 RX ADMIN — CLOTRIMAZOLE 1 TROCHE: 10 LOZENGE ORAL at 12:57

## 2018-12-31 RX ADMIN — CLOTRIMAZOLE 1 TROCHE: 10 LOZENGE ORAL at 16:05

## 2018-12-31 RX ADMIN — TACROLIMUS 8 MG: 5 CAPSULE ORAL at 08:20

## 2018-12-31 RX ADMIN — MYCOPHENOLATE MOFETIL 1000 MG: 250 CAPSULE ORAL at 18:47

## 2018-12-31 RX ADMIN — HUMAN INSULIN 6 UNITS/HR: 100 INJECTION, SOLUTION SUBCUTANEOUS at 21:05

## 2018-12-31 RX ADMIN — PIPERACILLIN SODIUM AND TAZOBACTAM SODIUM 3.38 G: 3; .375 INJECTION, POWDER, LYOPHILIZED, FOR SOLUTION INTRAVENOUS at 02:09

## 2018-12-31 RX ADMIN — PREDNISONE 5 MG: 5 TABLET ORAL at 08:20

## 2018-12-31 ASSESSMENT — ACTIVITIES OF DAILY LIVING (ADL)
ADLS_ACUITY_SCORE: 13

## 2018-12-31 NOTE — PLAN OF CARE
"[id number]    /70 (BP Location: Right arm)   Pulse 73   Temp 98.2  F (36.8  C) (Oral)   Resp 16   Ht 1.702 m (5' 7\")   Wt 89.6 kg (197 lb 8 oz)   SpO2 97%   BMI 30.93 kg/m     VS: AVSS and afebrile. BG: Algo 3 on insulin drip BG ranging from 121-151. LABS: Elevated liver labs, biopsy was performed today, waiting for results. Pain: denies Nausea: denies. Diet: Regular with good PO intake. LDA: PIV x2. GTT: TKO with insulin drip left PIV and R PIV with solumedrol currently infusing. GI/: Voiding in urinal, BM x1 this shift. Skin: Abd incision stapled IVETT, Cleaned with wound cleanser, Inguinal hernial incision, Stapled, IVETT, cleaned with wound cleanser, KARINE x2 Larger bulb with serous output (moderate), smaller bulb with scant output (possible pull today?), both dressings changed. Mobility: SBA, calls appropriately. Education: Med card and Lab book updated. Tests/Procedures: Liver Biopsy done in IR, dressing is CDI, pt resting comfortably. Plan: Awaiting biopsy results, will continue to monitor labs, BG, and treat with solumedrol. All care explained and questions answered. Will continue to monitor and notify team of changes.     "

## 2018-12-31 NOTE — PLAN OF CARE
"/68 (BP Location: Left arm)   Pulse 73   Temp 98.2  F (36.8  C) (Oral)   Resp 18   Ht 1.702 m (5' 7\")   Wt 89.6 kg (197 lb 8 oz)   SpO2 97%   BMI 30.93 kg/m       1900 - 0700: VSS on RA. Alert and oriented. Up SBA. Denies pain and nausea. 's x2 so insulin gtt initiated per orders.  - 230. Currently on algo #3. R PIV saline locked. L PIV TKO + insulin gtt. R KARINE x2, #1 with scant OP, #2 with 150ml serous OP. Incision stapled and IVETT, no drainage. Voiding adequately, no BM this shift. NPO since midnight for liver biopsy today. Liver US results pending. Resting between cares. Will continue to monitor and notify of any changes.   "

## 2018-12-31 NOTE — PLAN OF CARE
PT7A: cancel- pt just returning from IR and per RN on bedrest during scheduled 1pm  time. Will re-schedule for 1/2.

## 2018-12-31 NOTE — PROGRESS NOTES
IP Diabetes Management  Daily Note           Assessment and Plan:   HPI: Pt is a 64 yo man with a history of Laennec's cirrhosis with HCC, portal hypertension, latent TB, now s/p disease donor liver transplant on 12/22/2018.  He has had post-transplant hyperglycemia related to prednisone and IV solumedrol administration requiring transition to IV insulin 12/30. Undergoing liver biopsy to assess for rejection, and continues with IV Solumedrol dosing today, 12/31.     Assessment:   Pre-Diabetes (A1c 5.8%), with post transplant hyperglycemia related to steroids and immunosuppression meds.     Prednisone 5mg daily has been discontinued today as Tacro levels have improved. Patient is NPO since midnight; undergoing IR biopsy to assess for rejection. Received IV Solumedrol last night at 1655, and another 1000 mg dose is ordered for today at noon. He may have additional dosing tomorrow, however this is pending the IR biopsy results. Glucoses previously spiked to >370 with IV steroids.     Plan:    -continue IV insulin infusion until biopsy results return and further steroid planning determined.    -continue aspart 1:7 CHO coverage with meals and snacks/supplements   -BG monitoring per infusion protocol    -back on regular diet with carb counting protocol   -will need diabetes education prior to discharge for insulin teaching.     Plan discussed with patient with use of professional .    Interval History and Assessment: interval glucose trend reviewed: pt remains on insulin infusion, with good BG control on 1.5-4 units/hour. Was NPO at midnight for IR biopsy today. Patient feels well. Discussed timing of coming off of infusion post-biopsy (depending on steroid planning and advancement of diet). Working with therapy would like to go home on discharge. Discussed likelihood of at least short term insulin requirement on discharge.    Current nutritional intake and type: Orders Placed This Encounter      NPO  per Anesthesia Guidelines for Procedure/Surgery Except for: Meds      Planned Procedures/surgeries: IR biopsy today  Steroid planning: unclear, received IV solumedrol 1000mg 12/30, 12/31, and possibly on 1/1/19. Also on Tacrolimus and Mycophenolate.       PTA Diabetes Regimen: n/a, prediabetic    Discharge Planning: as early as later this week, pending therapies and biopsy results.            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           Medications:     Current Facility-Administered Medications   Medication     alfuzosin ER (UROXATRAL) 24 hr tablet 10 mg     amLODIPine (NORVASC) tablet 10 mg     aspirin (ASA) EC tablet 325 mg     bisacodyl (DULCOLAX) Suppository 10 mg     bisacodyl (DULCOLAX) Suppository 10 mg     clotrimazole (MYCELEX) lozenge 1 Giancarlo     dextrose 10 % 1,000 mL infusion     glucose gel 15-30 g    Or     dextrose 50 % injection 25-50 mL    Or     glucagon injection 1 mg     insulin 1 unit/mL in saline (NovoLIN, HumuLIN Regular) drip - ADULT IV Infusion     insulin aspart (NovoLOG) inj (RAPID ACTING)     insulin aspart (NovoLOG) inj (RAPID ACTING)     ipratropium - albuterol 0.5 mg/2.5 mg/3 mL (DUONEB) neb solution 3 mL     magic mouthwash suspension (diphenhydramine, lidocaine, aluminum-magnesium & simethicone)     magnesium hydroxide (MILK OF MAGNESIA) suspension 30 mL     metoprolol tartrate (LOPRESSOR) tablet 75 mg     multivitamin w/minerals (THERA-VIT-M) tablet 1 tablet     mycophenolate (GENERIC EQUIVALENT) capsule 1,000 mg     omeprazole (priLOSEC) CR capsule 20 mg     ondansetron (ZOFRAN) injection 4 mg     oxyCODONE (ROXICODONE) tablet 5-10 mg     polyethylene glycol (MIRALAX/GLYCOLAX) Packet 17 g     senna-docusate (SENOKOT-S/PERICOLACE) 8.6-50 MG per  "tablet 2 tablet     sodium chloride (PF) 0.9% PF flush 3 mL     sodium chloride (PF) 0.9% PF flush 3 mL     sodium chloride (PF) 0.9% PF flush 3 mL     sodium chloride 0.9% infusion     sulfamethoxazole-trimethoprim (BACTRIM/SEPTRA) 400-80 MG per tablet 1 tablet     tacrolimus (GENERIC EQUIVALENT) capsule 8 mg     valGANciclovir (VALCYTE) tablet 450 mg            Physical Exam:    /64 (BP Location: Left arm)   Pulse 73   Temp 98.2  F (36.8  C) (Oral)   Resp 18   Ht 1.702 m (5' 7\")   Wt 89.6 kg (197 lb 8 oz)   SpO2 97%   BMI 30.93 kg/m    General: pleasant, in no distress, sitting up in chair.   HEENT: normocephalic, atraumatic. Oral mucous membranes moist.   Lungs: unlabored respiration, no cough  ABD: rounded, soft, no lipodystrophy noted  Skin: warm and dry, no obvious lesions  MSK:  moves all extremities  Lymp:  no LE edema   Mental status:  alert, oriented to self, place, time  Psych:  affect, calm and appropriate interaction             Data:     Recent Labs   Lab 12/31/18  1501 12/31/18  1419 12/31/18  1254 12/31/18  1202 12/31/18  1106 12/31/18  0958  12/31/18  0536  12/30/18  0549  12/29/18  0636  12/28/18  0556  12/27/18  0512  12/26/18  0523   GLC  --   --   --   --   --   --   --  150*  --  181*  --  169*  --  169*  --  153*  --  218*   * 125* 128* 126* 151* 127*   < >  --    < >  --    < >  --    < >  --    < >  --    < >  --     < > = values in this interval not displayed.     Lab Results   Component Value Date    WBC 4.3 12/31/2018    WBC 4.8 12/30/2018    WBC 4.3 12/29/2018    HGB 8.2 (L) 12/31/2018    HGB 7.8 (L) 12/30/2018    HGB 7.8 (L) 12/29/2018    HCT 25.0 (L) 12/31/2018    HCT 23.9 (L) 12/30/2018    HCT 23.6 (L) 12/29/2018    MCV 97 12/31/2018    MCV 97 12/30/2018    MCV 96 12/29/2018     12/31/2018     12/30/2018    PLT 99 (L) 12/29/2018     Lab Results   Component Value Date     12/31/2018     12/30/2018     12/29/2018    POTASSIUM 4.3 " 12/31/2018    POTASSIUM 3.9 12/30/2018    POTASSIUM 4.1 12/29/2018    CHLORIDE 108 12/31/2018    CHLORIDE 106 12/30/2018    CHLORIDE 105 12/29/2018    CO2 26 12/31/2018    CO2 26 12/30/2018    CO2 26 12/29/2018     (H) 12/31/2018     (H) 12/30/2018     (H) 12/29/2018     Lab Results   Component Value Date    BUN 26 12/31/2018    BUN 31 (H) 12/30/2018    BUN 27 12/29/2018     Lab Results   Component Value Date    TSH 6.36 (H) 07/19/2018     Lab Results   Component Value Date    AST 47 (H) 12/31/2018     (H) 12/30/2018    AST 70 (H) 12/29/2018     (H) 12/31/2018     (H) 12/30/2018     (H) 12/29/2018    ALKPHOS 922 (H) 12/31/2018    ALKPHOS 1,088 (H) 12/30/2018    ALKPHOS 780 (H) 12/29/2018         Diabetes Management Team job code: [id number]  I spent a total of 25 minutes bedside and on the inpatient unit managing glycemic care. Over 50% of my time on the unit was spent counseling the patient and/or coordinating care regarding acute hyperglycemia management.  See note for details.    Roma Lira PA-C  Inpatient Diabetes Management Service  Pager [phone]

## 2018-12-31 NOTE — PROCEDURES
Interventional Radiology Brief Post Procedure Note    Procedure: IR LIVER BIOPSY PERCUTANEOUS    Proceduralist: Rashawn Torre PA-C    Assistant: None    Time Out: Prior to the start of the procedure and with procedural staff participation, I verbally confirmed the patient s identity using two indicators, relevant allergies, that the procedure was appropriate and matched the consent or emergent situation, and that the correct equipment/implants were available. Immediately prior to starting the procedure I conducted the Time Out with the procedural staff and re-confirmed the patient s name, procedure, and site/side. (The Joint Commission universal protocol was followed.)  Yes    Sedation: None. Local Anesthestic used    Findings: Completed ultrasound-guided random transplant liver biopsy. 2 cores sent to pathology in preservative. Gelfoam in tract. No immediate complication.    Estimated Blood Loss: Minimal    Fluoroscopy Time: None    Specimens: Core needle biopsy specimens sent for pathological analysis    Complications: 1. None     Condition: Stable    Plan: Follow up per primary team. Biopsy results pending.    Comments: See dictated procedure note for full details.    Rashawn Torre PA-C  Interventional Radiology  [phone]

## 2018-12-31 NOTE — PROGRESS NOTES
Transplant Surgery  Inpatient Daily Progress Note  12/31/2018    Assessment & Plan: 62 yo M hx Laennec's cirrhosis with HCC. S/p DBD OLT with right inguinal hernia repair with biologic mesh 12/22/18. Notable for small arterial anastomosis (donor common hepatic with atherosclerosis to recipient replaced right hepatic).    Graft function: POD 9. AP and ALT decreased today. Remainder of LFTs decreased. Received solu medrol yesterday for suspected rejection. Liver biopsy today (1100). Repeat SM 1000 mg after biopsy today. Will wait for biopsy results before giving additional SM tomorrow.  Biliary anastomotic stricture: 12/26 US: small fluid collection, otherwise normal. MRCP concern for biliary anastomotic narrowing. ERCP with minimal stricture- stented. Biliary sphincterotomy also performed.  Immunosuppression management: Induction with steroid pulse.  Methylpred:  Solu medrol 1gm on 12/30 and 12/31. Will stop prednisone 5 (was ordered until tac therapeutic).   MMF: 1000mg BID  Tacro: Goal level 10-15. Anticipate increase in level in 1-2 weeks after rifampin is fully metabolized (last dose 12/20). Tac increase to 8 BID yesterday. Level today 7.7 up from 4.7. Tac level daily.   Complexity of management: High. Contributing factors: induction, drug interaction rifampin, diflucan (last dose 12/28)   Hematology:   Anemia: Hgb stable  Vascular prophylaxis: Due to small HA anastomosis. Received dextran X48 hrs, on aspirin 325.   Cardiorespiratory:   Post operative atrial fibrillation with RVR: On 12/25. Started on scheduled metoprolol and amio bolus/drip. Trops low/negative. 12/25 Echo: severely dilated left atrium, mild to moderate LVH, preserved EF. Converted back to NSR 12/25 PM. Run of aberrant AF vs VT (14 beats) yesterday. Cardiology consulted. Recommend continue rate control with metoprolol 75 mg BID, no indication for anticoagulation. NSVT - 2 short runs documented on tele. Cards recommended no further cardiac testing  or management.   Hypoxia/dyspnea/reactive airways: Resolved. Continue PRN nebs and diuresis.  HTN: Amlodipine 10mg and metoprolol to 75mg oral BID. Pressures controlled  GI/Nutrition: eating well.  Constipation: improved. Continue senna BID, miralax daily. MOM prn  Endocrine:   Hyperglycemia: A1c 5.8% on admission. Steroid induced hyperglycemia. Hyperglycemia while on tac and pred 5.  Insulin gtt started due to solumedrol. Continue CC with meals.  Endocrine consulted. DM education for insulin administration and glucose monitoring.  Fluid/Electrolytes:   Hypervolemia: BLE edema and scrotal edema. Lymphedema consult. Low albumin. Monitor I&O. Consider albumin lasix today.   :   Hematuria: Developed 12/26 with manipulation of cota. Resolved. Removed cota. No issues voiding.  Scrotal edema: continue diuresis. Support sling while ambulating  Infectious disease: Afebrile, WBC 4.3  Latent TB: Completed 4 month course of rifampin prior to admission (8/21-12/20).  Prophylaxis: DVT, fall, GI, continue diflucan given herniorraphy with mesh. Stop Vanco.  PT/OT consult.   Disposition: 7A    Medical Decision Making: Medium    IRVIN/Fellow/Resident Provider: CARMENZA Wei [id number]    Faculty: Elvia Field M.D.  __________________________________________________________________  Transplant History: Admitted 12/21/2018 for DDLT and RIH repair (XenMatrix mesh).   The patient has a history of liver failure due to Laennec's cirrhosis with HCC.    12/22/2018 (Liver), Postoperative day: 9     Interval History: Fair intake. BM yesterday. Ambulating with assistance. No c/o pain.    ROS:   A 10-point review of systems was negative except as noted above.    Curent Meds:    alfuzosin ER  10 mg Oral Daily with breakfast     amLODIPine  10 mg Oral Daily     aspirin  325 mg Oral Daily     bisacodyl  10 mg Rectal Once     insulin aspart  1-12 Units Subcutaneous Q4H     insulin aspart   Subcutaneous TID AC     insulin glargine  15 Units  "Subcutaneous Daily     methylPREDNISolone  1,000 mg Intravenous Once     metoprolol tartrate  75 mg Oral BID     multivitamin w/minerals  1 tablet Oral Daily     mycophenolate  1,000 mg Oral BID IS     omeprazole  20 mg Oral QAM AC     polyethylene glycol  17 g Oral Daily     predniSONE  5 mg Oral Daily     senna-docusate  2 tablet Oral or Feeding Tube BID     sodium chloride (PF)  3 mL Intravenous Q8H     sulfamethoxazole-trimethoprim  1 tablet Oral or Feeding Tube Daily     tacrolimus  8 mg Oral BID IS     valGANciclovir  450 mg Oral Daily       Physical Exam:     Admit Weight: 84.6 kg (186 lb 9.6 oz)    Current Vitals:   /64 (BP Location: Left arm)   Pulse 73   Temp 98.2  F (36.8  C) (Oral)   Resp 18   Ht 1.702 m (5' 7\")   Wt 89.6 kg (197 lb 8 oz)   SpO2 97%   BMI 30.93 kg/m      Vital sign ranges:    Temp:  [97.9  F (36.6  C)-99.6  F (37.6  C)] 98.2  F (36.8  C)  Heart Rate:  [60-72] 60  Resp:  [16-20] 18  BP: (118-144)/(64-68) 131/64  SpO2:  [95 %-98 %] 97 %  Patient Vitals for the past 24 hrs:   BP Temp Temp src Heart Rate Resp SpO2   12/31/18 0731 131/64 98.2  F (36.8  C) Oral 60 18 97 %   12/31/18 0328 132/68 98.2  F (36.8  C) Oral 60 18 97 %   12/31/18 0014 132/66 98.6  F (37  C) Oral 67 20 95 %   12/30/18 1951 144/65 97.9  F (36.6  C) Oral 69 16 95 %   12/30/18 1642 134/64 98  F (36.7  C) Oral 69 16 98 %   12/30/18 1208 118/68 99.6  F (37.6  C) Oral 72 16 95 %     General Appearance: in no apparent distress.   Skin: normal, warm, dry, No rashes, induration, or jaundice  Heart: RRR  Lungs: B/l diminished, without wheezes  Abdomen: abd soft, slightly distended. Incision c/d/i, staples in place. KARINE X2- serosang, ~500 out in last 24 hrs from abdominal drain, 0 from hernia drain  : The genitalia are edematous   Extremities: edema: present bilaterally. 2+. There is no skin breakdown.  Neurologic: awake, alert and oriented x4. Tremor absent.  PIV only      Data:   CMP  Recent Labs   Lab " 12/31/18  0536 12/30/18  0549    140   POTASSIUM 4.3 3.9   CHLORIDE 108 106   CO2 26 26   * 181*   BUN 26 31*   CR 0.97 1.24   GFRESTIMATED 82 60*   GFRESTBLACK >90 70   YELENA 7.6* 7.3*   MAG 1.9 2.0   PHOS 3.0 2.7   ALBUMIN 1.8* 1.7*   BILITOTAL 0.8 0.8   ALKPHOS 922* 1,088*   AST 47* 130*   * 341*     CBC  Recent Labs   Lab 12/31/18  0536 12/30/18  0549   HGB 8.2* 7.8*   WBC 4.3 4.8    160     COAGS  Recent Labs   Lab 12/31/18  0715   INR 1.14      Urinalysis  Recent Labs   Lab Test 12/25/18  0732 12/22/18  0034  07/19/18  1133   COLOR Yellow Yellow   < > Yellow   APPEARANCE Slightly Cloudy Clear   < > Clear   URINEGLC Negative Negative   < > Negative   URINEBILI Small* Negative   < > Negative   URINEKETONE Negative Negative   < > Negative   SG 1.021 1.012   < > 1.009   UBLD Moderate* Trace*   < > Negative   URINEPH 6.0 6.5   < > 8.0*   PROTEIN 30* Negative   < > Negative   NITRITE Negative Negative   < > Negative   LEUKEST Negative Negative   < > Negative   RBCU 7* 3*   < >  --    WBCU 1 1   < >  --    UTPG  --   --   --  Unable to calculate due to low value    < > = values in this interval not displayed.     Virology:  Hepatitis C Antibody   Date Value Ref Range Status   12/22/2018 Nonreactive NR^Nonreactive Final     Comment:     Assay performance characteristics have not been established for newborns,   infants, and children

## 2018-12-31 NOTE — PROGRESS NOTES
Transplant Social Work Services Progress Note      Date of Initial Social Work Evaluation: 12/24/2018  Collaborated with: Chart review, medical team.    Data: SW received consult to discuss transportation with SW. Due to time constraints SW unable to meet with pt today. Pt does have Danielle MA and will have ride benefits to get to appointments. Pt will be here until the end of the week at the earliest. SW will meet with pt on Wednesday 1/2 to discuss transportation and financial assistance (discussed with pts daughter last week).  Intervention: Spoke with medical team.  Assessment: Pt should have ride benefits through insurance.  Education provided by SW: Ride benefits.  Plan:    Discharge Plans in Progress: SW following if pt needs rehab placement.    Barriers to d/c plan: Medical stability.    Follow up Plan: SW will continue to follow.    ABRAHAM Soriano, LGSW  7A   Ph: [phone], Pager: [phone]

## 2018-12-31 NOTE — CONSULTS
Patient is on IR schedule 12/31/2018 for a US guided random transplant liver biopsy.   Labs WNL for procedure.   Orders for NPO, scrubs and antibiotics have been entered.   Consent will be done prior to procedure.   Please contact the IR charge RN at [phone] for estimated time of procedure.       Paulina Oneal IR RPA  [phone] Call pager  [phone] pager

## 2018-12-31 NOTE — PROGRESS NOTES
Calorie Count    Intake recorded for: 12/30  Total Kcals: 1892 Total Protein: 68g    Kcals from Hospital Food: 1892  Protein: 68g    Kcals from Outside Food (average):0 Protein: 0g    # Meals Recorded: 100% milk, orange juice, yogurt, oatmeal w/ brown sugar, scrambled eggs, 2x wheat toast w/ butter    # Supplements Recorded: 100% 2 Boost Shakes

## 2018-12-31 NOTE — PLAN OF CARE
OT/7A - Discharge Planner OT   Patient plan for discharge: home with home therapy  Current status: Reviewed post surgical abdominal precautions and provided handout in Peruvian to reinforce learning. Facilitated standing ADL in bathroom with SBA.   Barriers to return to prior living situation: post surgical precautions  Recommendations for discharge: pt is progressing towards home with assist and home therapy  Rationale for recommendations: Pt is progressing well during inpatient therapy sessions. Pt is motivated to return home and is interested in home therapy.        Entered by: Dionne Su 12/31/2018 3:28 PM

## 2019-01-01 ENCOUNTER — OFFICE VISIT (OUTPATIENT)
Dept: INTERPRETER SERVICES | Facility: CLINIC | Age: 64
End: 2019-01-01
Payer: MEDICARE

## 2019-01-01 ENCOUNTER — APPOINTMENT (OUTPATIENT)
Dept: LAB | Facility: CLINIC | Age: 64
End: 2019-01-01
Attending: TRANSPLANT SURGERY
Payer: MEDICARE

## 2019-01-01 LAB
ALBUMIN SERPL-MCNC: 2.2 G/DL (ref 3.4–5)
ALP SERPL-CCNC: 926 U/L (ref 40–150)
ALT SERPL W P-5'-P-CCNC: 282 U/L (ref 0–70)
ANION GAP SERPL CALCULATED.3IONS-SCNC: 7 MMOL/L (ref 3–14)
AST SERPL W P-5'-P-CCNC: 51 U/L (ref 0–45)
BILIRUB DIRECT SERPL-MCNC: 0.6 MG/DL (ref 0–0.2)
BILIRUB SERPL-MCNC: 0.7 MG/DL (ref 0.2–1.3)
BUN SERPL-MCNC: 30 MG/DL (ref 7–30)
CALCIUM SERPL-MCNC: 8.1 MG/DL (ref 8.5–10.1)
CHLORIDE SERPL-SCNC: 106 MMOL/L (ref 94–109)
CO2 SERPL-SCNC: 26 MMOL/L (ref 20–32)
CREAT SERPL-MCNC: 0.9 MG/DL (ref 0.66–1.25)
ERYTHROCYTE [DISTWIDTH] IN BLOOD BY AUTOMATED COUNT: 14.5 % (ref 10–15)
GFR SERPL CREATININE-BSD FRML MDRD: 89 ML/MIN/{1.73_M2}
GLUCOSE BLDC GLUCOMTR-MCNC: 110 MG/DL (ref 70–99)
GLUCOSE BLDC GLUCOMTR-MCNC: 112 MG/DL (ref 70–99)
GLUCOSE BLDC GLUCOMTR-MCNC: 119 MG/DL (ref 70–99)
GLUCOSE BLDC GLUCOMTR-MCNC: 122 MG/DL (ref 70–99)
GLUCOSE BLDC GLUCOMTR-MCNC: 124 MG/DL (ref 70–99)
GLUCOSE BLDC GLUCOMTR-MCNC: 126 MG/DL (ref 70–99)
GLUCOSE BLDC GLUCOMTR-MCNC: 131 MG/DL (ref 70–99)
GLUCOSE BLDC GLUCOMTR-MCNC: 133 MG/DL (ref 70–99)
GLUCOSE BLDC GLUCOMTR-MCNC: 133 MG/DL (ref 70–99)
GLUCOSE BLDC GLUCOMTR-MCNC: 134 MG/DL (ref 70–99)
GLUCOSE BLDC GLUCOMTR-MCNC: 135 MG/DL (ref 70–99)
GLUCOSE BLDC GLUCOMTR-MCNC: 136 MG/DL (ref 70–99)
GLUCOSE BLDC GLUCOMTR-MCNC: 137 MG/DL (ref 70–99)
GLUCOSE BLDC GLUCOMTR-MCNC: 139 MG/DL (ref 70–99)
GLUCOSE BLDC GLUCOMTR-MCNC: 141 MG/DL (ref 70–99)
GLUCOSE BLDC GLUCOMTR-MCNC: 143 MG/DL (ref 70–99)
GLUCOSE BLDC GLUCOMTR-MCNC: 143 MG/DL (ref 70–99)
GLUCOSE BLDC GLUCOMTR-MCNC: 145 MG/DL (ref 70–99)
GLUCOSE BLDC GLUCOMTR-MCNC: 153 MG/DL (ref 70–99)
GLUCOSE BLDC GLUCOMTR-MCNC: 153 MG/DL (ref 70–99)
GLUCOSE BLDC GLUCOMTR-MCNC: 163 MG/DL (ref 70–99)
GLUCOSE BLDC GLUCOMTR-MCNC: 174 MG/DL (ref 70–99)
GLUCOSE BLDC GLUCOMTR-MCNC: 190 MG/DL (ref 70–99)
GLUCOSE BLDC GLUCOMTR-MCNC: 203 MG/DL (ref 70–99)
GLUCOSE SERPL-MCNC: 144 MG/DL (ref 70–99)
HCT VFR BLD AUTO: 30.8 % (ref 40–53)
HGB BLD-MCNC: 10 G/DL (ref 13.3–17.7)
MAGNESIUM SERPL-MCNC: 2.1 MG/DL (ref 1.6–2.3)
MCH RBC QN AUTO: 31.5 PG (ref 26.5–33)
MCHC RBC AUTO-ENTMCNC: 32.5 G/DL (ref 31.5–36.5)
MCV RBC AUTO: 97 FL (ref 78–100)
PHOSPHATE SERPL-MCNC: 4.2 MG/DL (ref 2.5–4.5)
PLATELET # BLD AUTO: 341 10E9/L (ref 150–450)
POTASSIUM SERPL-SCNC: 4.4 MMOL/L (ref 3.4–5.3)
PROT SERPL-MCNC: 6.3 G/DL (ref 6.8–8.8)
RBC # BLD AUTO: 3.17 10E12/L (ref 4.4–5.9)
SODIUM SERPL-SCNC: 140 MMOL/L (ref 133–144)
TACROLIMUS BLD-MCNC: 17.4 UG/L (ref 5–15)
TME LAST DOSE: ABNORMAL H
WBC # BLD AUTO: 13.1 10E9/L (ref 4–11)

## 2019-01-01 PROCEDURE — 80048 BASIC METABOLIC PNL TOTAL CA: CPT | Performed by: INTERNAL MEDICINE

## 2019-01-01 PROCEDURE — 25000132 ZZH RX MED GY IP 250 OP 250 PS 637: Mod: GY | Performed by: NURSE PRACTITIONER

## 2019-01-01 PROCEDURE — 25000132 ZZH RX MED GY IP 250 OP 250 PS 637: Mod: GY | Performed by: PHYSICIAN ASSISTANT

## 2019-01-01 PROCEDURE — A9270 NON-COVERED ITEM OR SERVICE: HCPCS | Mod: GY | Performed by: NURSE PRACTITIONER

## 2019-01-01 PROCEDURE — 80076 HEPATIC FUNCTION PANEL: CPT | Performed by: INTERNAL MEDICINE

## 2019-01-01 PROCEDURE — 36415 COLL VENOUS BLD VENIPUNCTURE: CPT | Performed by: INTERNAL MEDICINE

## 2019-01-01 PROCEDURE — 00000146 ZZHCL STATISTIC GLUCOSE BY METER IP

## 2019-01-01 PROCEDURE — A9270 NON-COVERED ITEM OR SERVICE: HCPCS | Mod: GY | Performed by: SURGERY

## 2019-01-01 PROCEDURE — 83735 ASSAY OF MAGNESIUM: CPT | Performed by: INTERNAL MEDICINE

## 2019-01-01 PROCEDURE — 25000125 ZZHC RX 250: Performed by: PHYSICIAN ASSISTANT

## 2019-01-01 PROCEDURE — 25000131 ZZH RX MED GY IP 250 OP 636 PS 637: Mod: GY | Performed by: TRANSPLANT SURGERY

## 2019-01-01 PROCEDURE — 25000132 ZZH RX MED GY IP 250 OP 250 PS 637: Mod: GY | Performed by: STUDENT IN AN ORGANIZED HEALTH CARE EDUCATION/TRAINING PROGRAM

## 2019-01-01 PROCEDURE — 25000131 ZZH RX MED GY IP 250 OP 636 PS 637: Mod: GY | Performed by: SURGERY

## 2019-01-01 PROCEDURE — 80197 ASSAY OF TACROLIMUS: CPT | Performed by: PHYSICIAN ASSISTANT

## 2019-01-01 PROCEDURE — 25000132 ZZH RX MED GY IP 250 OP 250 PS 637: Mod: GY | Performed by: SURGERY

## 2019-01-01 PROCEDURE — 25000128 H RX IP 250 OP 636: Performed by: SURGERY

## 2019-01-01 PROCEDURE — A9270 NON-COVERED ITEM OR SERVICE: HCPCS | Mod: GY | Performed by: PHYSICIAN ASSISTANT

## 2019-01-01 PROCEDURE — T1013 SIGN LANG/ORAL INTERPRETER: HCPCS | Mod: U3

## 2019-01-01 PROCEDURE — A9270 NON-COVERED ITEM OR SERVICE: HCPCS | Mod: GY | Performed by: STUDENT IN AN ORGANIZED HEALTH CARE EDUCATION/TRAINING PROGRAM

## 2019-01-01 PROCEDURE — 25000132 ZZH RX MED GY IP 250 OP 250 PS 637: Mod: GY | Performed by: TRANSPLANT SURGERY

## 2019-01-01 PROCEDURE — 12000026 ZZH R&B TRANSPLANT

## 2019-01-01 PROCEDURE — 84100 ASSAY OF PHOSPHORUS: CPT | Performed by: INTERNAL MEDICINE

## 2019-01-01 PROCEDURE — 85027 COMPLETE CBC AUTOMATED: CPT | Performed by: INTERNAL MEDICINE

## 2019-01-01 RX ORDER — PANTOPRAZOLE SODIUM 40 MG/1
40 TABLET, DELAYED RELEASE ORAL
Status: DISCONTINUED | OUTPATIENT
Start: 2019-01-02 | End: 2019-01-07 | Stop reason: HOSPADM

## 2019-01-01 RX ORDER — SIMETHICONE 80 MG
80 TABLET,CHEWABLE ORAL 4 TIMES DAILY
Status: DISCONTINUED | OUTPATIENT
Start: 2019-01-01 | End: 2019-01-07 | Stop reason: HOSPADM

## 2019-01-01 RX ORDER — CALCIUM CARBONATE 500 MG/1
500 TABLET, CHEWABLE ORAL DAILY PRN
Status: DISCONTINUED | OUTPATIENT
Start: 2019-01-01 | End: 2019-01-07 | Stop reason: HOSPADM

## 2019-01-01 RX ADMIN — ASPIRIN 325 MG: 325 TABLET, DELAYED RELEASE ORAL at 07:47

## 2019-01-01 RX ADMIN — MULTIPLE VITAMINS W/ MINERALS TAB 1 TABLET: TAB at 07:40

## 2019-01-01 RX ADMIN — OXYCODONE HYDROCHLORIDE 5 MG: 5 TABLET ORAL at 09:42

## 2019-01-01 RX ADMIN — HUMAN INSULIN 3 UNITS/HR: 100 INJECTION, SOLUTION SUBCUTANEOUS at 17:38

## 2019-01-01 RX ADMIN — METOPROLOL TARTRATE 75 MG: 50 TABLET ORAL at 07:38

## 2019-01-01 RX ADMIN — CLOTRIMAZOLE 1 TROCHE: 10 LOZENGE ORAL at 20:22

## 2019-01-01 RX ADMIN — ALFUZOSIN HYDROCHLORIDE 10 MG: 10 TABLET, EXTENDED RELEASE ORAL at 07:35

## 2019-01-01 RX ADMIN — OMEPRAZOLE 20 MG: 20 CAPSULE, DELAYED RELEASE ORAL at 07:35

## 2019-01-01 RX ADMIN — OXYCODONE HYDROCHLORIDE 5 MG: 5 TABLET ORAL at 05:15

## 2019-01-01 RX ADMIN — SULFAMETHOXAZOLE AND TRIMETHOPRIM 1 TABLET: 400; 80 TABLET ORAL at 07:33

## 2019-01-01 RX ADMIN — CLOTRIMAZOLE 1 TROCHE: 10 LOZENGE ORAL at 16:21

## 2019-01-01 RX ADMIN — MYCOPHENOLATE MOFETIL 1000 MG: 250 CAPSULE ORAL at 07:31

## 2019-01-01 RX ADMIN — SENNOSIDES AND DOCUSATE SODIUM 2 TABLET: 8.6; 5 TABLET ORAL at 07:39

## 2019-01-01 RX ADMIN — HUMAN INSULIN 3 UNITS/HR: 100 INJECTION, SOLUTION SUBCUTANEOUS at 06:59

## 2019-01-01 RX ADMIN — OXYCODONE HYDROCHLORIDE 5 MG: 5 TABLET ORAL at 15:04

## 2019-01-01 RX ADMIN — CLOTRIMAZOLE 1 TROCHE: 10 LOZENGE ORAL at 13:10

## 2019-01-01 RX ADMIN — METOPROLOL TARTRATE 75 MG: 50 TABLET ORAL at 20:21

## 2019-01-01 RX ADMIN — CLOTRIMAZOLE 1 TROCHE: 10 LOZENGE ORAL at 07:40

## 2019-01-01 RX ADMIN — SODIUM CHLORIDE 1000 MG: 9 INJECTION, SOLUTION INTRAVENOUS at 16:15

## 2019-01-01 RX ADMIN — VALGANCICLOVIR 450 MG: 450 TABLET, FILM COATED ORAL at 07:34

## 2019-01-01 RX ADMIN — SIMETHICONE CHEW TAB 80 MG 80 MG: 80 TABLET ORAL at 14:11

## 2019-01-01 RX ADMIN — AMLODIPINE BESYLATE 10 MG: 10 TABLET ORAL at 07:36

## 2019-01-01 RX ADMIN — TACROLIMUS 8 MG: 5 CAPSULE ORAL at 07:32

## 2019-01-01 RX ADMIN — POLYETHYLENE GLYCOL 3350 17 G: 17 POWDER, FOR SOLUTION ORAL at 11:14

## 2019-01-01 RX ADMIN — SIMETHICONE CHEW TAB 80 MG 80 MG: 80 TABLET ORAL at 20:22

## 2019-01-01 RX ADMIN — TACROLIMUS 6 MG: 5 CAPSULE ORAL at 18:14

## 2019-01-01 RX ADMIN — MAGNESIUM HYDROXIDE 30 ML: 400 SUSPENSION ORAL at 22:06

## 2019-01-01 RX ADMIN — MYCOPHENOLATE MOFETIL 1000 MG: 250 CAPSULE ORAL at 18:13

## 2019-01-01 RX ADMIN — SIMETHICONE CHEW TAB 80 MG 80 MG: 80 TABLET ORAL at 16:21

## 2019-01-01 RX ADMIN — SENNOSIDES AND DOCUSATE SODIUM 2 TABLET: 8.6; 5 TABLET ORAL at 20:21

## 2019-01-01 RX ADMIN — BISACODYL 10 MG: 10 SUPPOSITORY RECTAL at 14:17

## 2019-01-01 ASSESSMENT — ACTIVITIES OF DAILY LIVING (ADL)
ADLS_ACUITY_SCORE: 13

## 2019-01-01 ASSESSMENT — MIFFLIN-ST. JEOR: SCORE: 1628.6

## 2019-01-01 NOTE — PLAN OF CARE
Patient complaining most of the morning about increased abdominal distention and discomfort. States passing very little flatus, has a poor appetite due to fullness. Senna and Miralax given, team to order Simethicone. Message left with PLC to set up education classes with  if they have not been completed. His wife preferred late afternoon class.  and  Ipad used for communication. Right JPX2. Moderate scrotal and bilateral lower extremity edema. Insulin drip/carb coverage, blood sugars labile, low 100's to 200. Left thigh blister popped, Bacitracin applied, left open to air. New order for last dose of Solumedrol 1000 mg, liver biopsy results are still pending.

## 2019-01-01 NOTE — PLAN OF CARE
Vitally stable, incisional pain treated with PRN oxycodone. Up walking hallways with standby assist. Patient reports one BM this shift. Adequate urine output. Blood sugars ranger from 194-136 on insulin drip using algorithm #3. 315 mL serous output collectively through JPs. Wife at bedside supportive of patient.

## 2019-01-01 NOTE — PLAN OF CARE
PT Late entry for 12/30 session  Discharge Planner PT   Patient plan for discharge: home  Current status: PT: Pt supine, agreeable to working with PT. Pt transfers min A sup>sit transferring to R side of bed as will at home, edu on hooklying an dfull roll for precs. Pt sit<>stand SBA., toileting min A with extra time. Pt amb 225' CGA to SBA with no device, wide JANET and some lateral deviations but no LOB.   Barriers to return to prior living situation: medical status  Recommendations for discharge: home with A  Rationale for recommendations: anticipate continued progression in I with mobility       Entered by: Genet Araogn 01/01/2019 9:20 AM

## 2019-01-01 NOTE — PLAN OF CARE
"0 - 730  VS: /87 (BP Location: Left arm)   Pulse 73   Temp 97.8  F (36.6  C) (Oral)   Resp 18   Ht 1.702 m (5' 7\")   Wt 89.6 kg (197 lb 8 oz)   SpO2 97%   BMI 30.93 kg/m    VSS on RA  B - 143 currently on algorithm #3 on 3 units  Pain/Nausea: Oxycodone 5 mg x 1 for abdominal pain, denied nausea  Diet: Regular diet  LDA: L PIV infusing TKO and insulin gtt, R PIV saline locked, 2 JPs #2 100 mL serous output  GI:Voided 300 mL  : No BM  Skin: Incision stapled open to air, biopsy site covered CDI - scabs around liver incision  Mobility:Up with assist x 1, pt showered this AM  Plan: Continue to monitor.    "

## 2019-01-01 NOTE — PROGRESS NOTES
"                          Diabetes Consult Daily Progress Note          Assessment/Plan:   Pt is a 62 yo man with a history of Laennec's cirrhosis with HCC, portal hypertension, latent TB, now s/p disease donor liver transplant on 12/22/2018.  He has had post-transplant hyperglycemia related to prednisone and IV solumedrol administration requiring transition to IV insulin 12/30. Undergoing liver biopsy to assess for rejection, and continues with IV Solumedrol dosing today, 12/31.       # Steroid-induced hyperglycemia  Patient with pre-diabetes prior to transplant. Was on small dose of lantus up until pulse dose steroids that were started a couple days ago, now on insulin gtt with increased requirements. Now would not be an ideal time to transition him off the insulin gtt because the high-dose steroid is still impacting his insulin requirements. Should be able to transition tomorrow if he doesn't get any additional steroids.   - Continue insulin gtt  - Continue novolog 1 unit per 7 grams CHO    Patient was seen and discussed with attending Dr. Dea Veliz MD  PGY4, Endocrinology Fellow     I have reviewed and edited the fellow's note, and agree with the plan of care.  ABRAM Bee          Interval History:   Liver biopsy yesterday. Attempted to stop by room to talk with patient, but he was not on the unit     Blood glucose trend:  Overall 100s-150s with occasional spikes around 200    Other Pertinent Medications/Considerations:   - last solumedrol 1 gram on 12/31/18           Exam:    Blood pressure 146/67, pulse 73, temperature 98.2  F (36.8  C), resp. rate 16, height 1.702 m (5' 7\"), weight 88.5 kg (195 lb 1.6 oz), SpO2 100 %.    General: Alert, resting in bed, NAD.   HEENT: NC/AT, mucous membranes are moist.  Resp: Unlabored breathing.   Neuro: Alert and oriented, communicating clearly.          Data:     Lab Results   Component Value Date    A1C 5.8 12/22/2018       Recent Labs   Lab " 01/01/19  1158 01/01/19  1104 01/01/19  0957 01/01/19  0855 01/01/19  0801 01/01/19  0701 01/01/19  0612  12/31/18  0536  12/30/18  0549  12/29/18  0636  12/28/18  0556  12/27/18  0512   GLC  --   --   --   --   --   --  144*  --  150*  --  181*  --  169*  --  169*  --  153*   * 203* 163* 143* 110* 133*  --    < >  --    < >  --    < >  --    < >  --    < >  --     < > = values in this interval not displayed.

## 2019-01-01 NOTE — PROGRESS NOTES
Transplant Surgery  Inpatient Daily Progress Note  01/01/2019    Assessment & Plan: 64 yo M hx Laennec's cirrhosis with HCC. S/p DBD OLT with right inguinal hernia repair with biologic mesh 12/22/18. Notable for small arterial anastomosis (donor common hepatic with atherosclerosis to recipient replaced right hepatic).    Graft function: POD 10. AP and ALT stable today. Remainder of LFTs decreased. Given solumedrol X2 for past two days. Ordered for today due to lack of bx results anticipated today (per path still needs IF staining). Biliary anastomotic stricture: 12/26 US: small fluid collection, otherwise normal. MRCP concern for biliary anastomotic narrowing. ERCP with minimal stricture- stented. Biliary sphincterotomy also performed.  Immunosuppression management: Induction with steroid pulse.  Methylpred:  Solu medrol 1gm on 12/30 12/31, 1/1. Awaiting bx results. Will stop prednisone 5 (was ordered until tac therapeutic).   MMF: 1000mg BID  Tacro: Goal level 10-15. Sudden jump in level from 7.7 to 17.4 with increase to 8BID- decrease to 6 BID and monitor. Will not need maintenance steroids after rejection pulse/taper if remains therapeutic  Hematology:   Anemia: Hgb stable  Vascular prophylaxis: Due to small HA anastomosis. Received dextran X48 hrs, on aspirin 325.   Cardiorespiratory:   Post operative atrial fibrillation with RVR: On 12/25. Started on scheduled metoprolol and amio bolus/drip. Trops low/negative. 12/25 Echo: severely dilated left atrium, mild to moderate LVH, preserved EF. Converted back to NSR 12/25 PM. Run of aberrant AF vs VT (14 beats) yesterday. Cardiology consulted. Recommend continue rate control with metoprolol 75 mg BID, no indication for anticoagulation. NSVT - 2 short runs documented on tele. Cards recommended no further cardiac testing or management.   Hypoxia/dyspnea/reactive airways: Resolved. Continue PRN nebs and diuresis.  HTN: Amlodipine 10mg and metoprolol to 75mg oral BID.  Pressures controlled  GI/Nutrition: eating well.  Constipation: improved. Continue senna BID, miralax daily. MOM prn. Adding simethicone and PPI for bloating and reflux  Endocrine:   Hyperglycemia: A1c 5.8% on admission. Steroid induced hyperglycemia. Hyperglycemia while on tac and pred 5.  Insulin gtt started due to solumedrol. Continue CC with meals.  Endocrine consulted. DM education for insulin administration and glucose monitoring.  Fluid/Electrolytes:   Hypervolemia: BLE edema and scrotal edema. Lymphedema consult. Low albumin. Monitor I&O. Leg swelling much improved- hold further albumin/lasix  :   Hematuria: Developed 12/26 with manipulation of cota. Resolved. Removed cota. No issues voiding.  Scrotal edema: continue diuresis. Support sling while ambulating  Infectious disease: Afebrile, WBC 13.1 with steroid pulse  Latent TB: Completed 4 month course of rifampin prior to admission (8/21-12/20).  Prophylaxis: DVT, fall, GI, continue diflucan given herniorraphy with mesh. Stop Vanco.  PT/OT consult.   Disposition: 7A    Medical Decision Making: Medium    IRVIN/Fellow/Resident Provider: Sebastián Agudelo, Fellow    Faculty: Elvia Field M.D.  __________________________________________________________________  Transplant History: Admitted 12/21/2018 for DDLT and RIH repair (XenMatrix mesh).   The patient has a history of liver failure due to Laennec's cirrhosis with HCC.    12/22/2018 (Liver), Postoperative day: 10     Interval History: Eating fine but has bloating/reflux. No other complaints. Ambulating, motivated to get strong and go home.    ROS:   A 10-point review of systems was negative except as noted above.    Curent Meds:    alfuzosin ER  10 mg Oral Daily with breakfast     amLODIPine  10 mg Oral Daily     aspirin  325 mg Oral Daily     bisacodyl  10 mg Rectal Once     clotrimazole  1 Giancarlo Buccal 4x Daily     insulin aspart   Subcutaneous TID AC     methylPREDNISolone  1,000 mg Intravenous  "Once     metoprolol tartrate  75 mg Oral BID     multivitamin w/minerals  1 tablet Oral Daily     mycophenolate  1,000 mg Oral BID IS     omeprazole  20 mg Oral QAM AC     polyethylene glycol  17 g Oral Daily     senna-docusate  2 tablet Oral or Feeding Tube BID     simethicone  80 mg Oral 4x Daily     sodium chloride (PF)  3 mL Intravenous Q8H     sulfamethoxazole-trimethoprim  1 tablet Oral or Feeding Tube Daily     tacrolimus  6 mg Oral BID IS     valGANciclovir  450 mg Oral Daily       Physical Exam:     Admit Weight: 84.6 kg (186 lb 9.6 oz)    Current Vitals:   /67   Pulse 73   Temp 98.2  F (36.8  C)   Resp 16   Ht 1.702 m (5' 7\")   Wt 88.5 kg (195 lb 1.6 oz)   SpO2 100%   BMI 30.56 kg/m      Vital sign ranges:    Temp:  [97.8  F (36.6  C)-98.2  F (36.8  C)] 98.2  F (36.8  C)  Heart Rate:  [60-70] 70  Resp:  [16-18] 16  BP: (139-170)/(67-87) 146/67  SpO2:  [97 %-100 %] 100 %  Patient Vitals for the past 24 hrs:   BP Temp Temp src Heart Rate Resp SpO2 Weight   01/01/19 1224 146/67 98.2  F (36.8  C) -- 70 16 100 % --   01/01/19 0730 157/67 98  F (36.7  C) Oral 61 18 99 % 88.5 kg (195 lb 1.6 oz)   01/01/19 0400 170/87 97.8  F (36.6  C) Oral 60 18 97 % --   01/01/19 0006 146/75 98.1  F (36.7  C) Oral 62 18 98 % --   12/31/18 1549 139/70 98  F (36.7  C) Oral 66 16 97 % --     General Appearance: in no apparent distress.   Skin: normal, warm, dry, No rashes, induration, or jaundice  Heart: RRR  Lungs: B/l diminished, without wheezes  Abdomen: abd soft, slightly distended. Incision c/d/i, staples in place. KARINE X2- serosang, ~500 out in last 24 hrs from abdominal drain, 0 from hernia drain  : The genitalia are edematous   Extremities: edema: present bilaterally. 2+. There is no skin breakdown.  Neurologic: awake, alert and oriented x4. Tremor absent.  PIV only      Data:   CMP  Recent Labs   Lab 01/01/19  0612 12/31/18  0536    141   POTASSIUM 4.4 4.3   CHLORIDE 106 108   CO2 26 26   * 150* "   BUN 30 26   CR 0.90 0.97   GFRESTIMATED 89 82   GFRESTBLACK >90 >90   YELENA 8.1* 7.6*   MAG 2.1 1.9   PHOS 4.2 3.0   ALBUMIN 2.2* 1.8*   BILITOTAL 0.7 0.8   ALKPHOS 926* 922*   AST 51* 47*   * 259*     CBC  Recent Labs   Lab 01/01/19  0612 12/31/18  0536   HGB 10.0* 8.2*   WBC 13.1* 4.3    176     COAGS  Recent Labs   Lab 12/31/18  0715   INR 1.14      Urinalysis  Recent Labs   Lab Test 12/25/18  0732 12/22/18  0034  07/19/18  1133   COLOR Yellow Yellow   < > Yellow   APPEARANCE Slightly Cloudy Clear   < > Clear   URINEGLC Negative Negative   < > Negative   URINEBILI Small* Negative   < > Negative   URINEKETONE Negative Negative   < > Negative   SG 1.021 1.012   < > 1.009   UBLD Moderate* Trace*   < > Negative   URINEPH 6.0 6.5   < > 8.0*   PROTEIN 30* Negative   < > Negative   NITRITE Negative Negative   < > Negative   LEUKEST Negative Negative   < > Negative   RBCU 7* 3*   < >  --    WBCU 1 1   < >  --    UTPG  --   --   --  Unable to calculate due to low value    < > = values in this interval not displayed.     Virology:  Hepatitis C Antibody   Date Value Ref Range Status   12/22/2018 Nonreactive NR^Nonreactive Final     Comment:     Assay performance characteristics have not been established for newborns,   infants, and children

## 2019-01-01 NOTE — PROGRESS NOTES
Calorie Count    Intake recorded for: 12/31  Total Kcals: 0 Total Protein: 0g    Kcals from Hospital Food: 0   Protein: 0g    Kcals from Outside Food (average):0 Protein: 0g    # Meals Recorded: 1 meal ordered from kitchen, no intake recorded.     # Supplements Recorded: no intake recorded.

## 2019-01-02 ENCOUNTER — APPOINTMENT (OUTPATIENT)
Dept: PHYSICAL THERAPY | Facility: CLINIC | Age: 64
DRG: 005 | End: 2019-01-02
Attending: TRANSPLANT SURGERY
Payer: MEDICARE

## 2019-01-02 ENCOUNTER — PATIENT OUTREACH (OUTPATIENT)
Dept: GASTROENTEROLOGY | Facility: CLINIC | Age: 64
End: 2019-01-02

## 2019-01-02 ENCOUNTER — APPOINTMENT (OUTPATIENT)
Dept: OCCUPATIONAL THERAPY | Facility: CLINIC | Age: 64
DRG: 005 | End: 2019-01-02
Attending: TRANSPLANT SURGERY
Payer: MEDICARE

## 2019-01-02 ENCOUNTER — TELEPHONE (OUTPATIENT)
Dept: TRANSPLANT | Facility: CLINIC | Age: 64
End: 2019-01-02

## 2019-01-02 DIAGNOSIS — Z94.4 LIVER REPLACED BY TRANSPLANT (H): Primary | ICD-10-CM

## 2019-01-02 DIAGNOSIS — K83.1 BILE DUCT STENOSIS (H): Primary | ICD-10-CM

## 2019-01-02 LAB
ALBUMIN SERPL-MCNC: 1.9 G/DL (ref 3.4–5)
ALP SERPL-CCNC: 736 U/L (ref 40–150)
ALT SERPL W P-5'-P-CCNC: 221 U/L (ref 0–70)
ANION GAP SERPL CALCULATED.3IONS-SCNC: 6 MMOL/L (ref 3–14)
AST SERPL W P-5'-P-CCNC: 43 U/L (ref 0–45)
BILIRUB DIRECT SERPL-MCNC: 0.4 MG/DL (ref 0–0.2)
BILIRUB SERPL-MCNC: 0.7 MG/DL (ref 0.2–1.3)
BUN SERPL-MCNC: 29 MG/DL (ref 7–30)
CALCIUM SERPL-MCNC: 7.8 MG/DL (ref 8.5–10.1)
CHLORIDE SERPL-SCNC: 108 MMOL/L (ref 94–109)
CO2 SERPL-SCNC: 26 MMOL/L (ref 20–32)
COPATH REPORT: NORMAL
CREAT SERPL-MCNC: 0.76 MG/DL (ref 0.66–1.25)
ERYTHROCYTE [DISTWIDTH] IN BLOOD BY AUTOMATED COUNT: 14.7 % (ref 10–15)
GFR SERPL CREATININE-BSD FRML MDRD: >90 ML/MIN/{1.73_M2}
GLUCOSE BLDC GLUCOMTR-MCNC: 104 MG/DL (ref 70–99)
GLUCOSE BLDC GLUCOMTR-MCNC: 110 MG/DL (ref 70–99)
GLUCOSE BLDC GLUCOMTR-MCNC: 113 MG/DL (ref 70–99)
GLUCOSE BLDC GLUCOMTR-MCNC: 114 MG/DL (ref 70–99)
GLUCOSE BLDC GLUCOMTR-MCNC: 114 MG/DL (ref 70–99)
GLUCOSE BLDC GLUCOMTR-MCNC: 123 MG/DL (ref 70–99)
GLUCOSE BLDC GLUCOMTR-MCNC: 126 MG/DL (ref 70–99)
GLUCOSE BLDC GLUCOMTR-MCNC: 131 MG/DL (ref 70–99)
GLUCOSE BLDC GLUCOMTR-MCNC: 133 MG/DL (ref 70–99)
GLUCOSE BLDC GLUCOMTR-MCNC: 135 MG/DL (ref 70–99)
GLUCOSE BLDC GLUCOMTR-MCNC: 148 MG/DL (ref 70–99)
GLUCOSE BLDC GLUCOMTR-MCNC: 157 MG/DL (ref 70–99)
GLUCOSE BLDC GLUCOMTR-MCNC: 158 MG/DL (ref 70–99)
GLUCOSE BLDC GLUCOMTR-MCNC: 159 MG/DL (ref 70–99)
GLUCOSE BLDC GLUCOMTR-MCNC: 169 MG/DL (ref 70–99)
GLUCOSE BLDC GLUCOMTR-MCNC: 174 MG/DL (ref 70–99)
GLUCOSE BLDC GLUCOMTR-MCNC: 220 MG/DL (ref 70–99)
GLUCOSE BLDC GLUCOMTR-MCNC: 221 MG/DL (ref 70–99)
GLUCOSE BLDC GLUCOMTR-MCNC: 250 MG/DL (ref 70–99)
GLUCOSE BLDC GLUCOMTR-MCNC: 90 MG/DL (ref 70–99)
GLUCOSE BLDC GLUCOMTR-MCNC: 99 MG/DL (ref 70–99)
GLUCOSE SERPL-MCNC: 139 MG/DL (ref 70–99)
HCT VFR BLD AUTO: 26.3 % (ref 40–53)
HGB BLD-MCNC: 8.5 G/DL (ref 13.3–17.7)
MAGNESIUM SERPL-MCNC: 2.2 MG/DL (ref 1.6–2.3)
MCH RBC QN AUTO: 31.5 PG (ref 26.5–33)
MCHC RBC AUTO-ENTMCNC: 32.3 G/DL (ref 31.5–36.5)
MCV RBC AUTO: 97 FL (ref 78–100)
PHOSPHATE SERPL-MCNC: 4 MG/DL (ref 2.5–4.5)
PLATELET # BLD AUTO: 259 10E9/L (ref 150–450)
POTASSIUM SERPL-SCNC: 4.7 MMOL/L (ref 3.4–5.3)
PROT SERPL-MCNC: 5.3 G/DL (ref 6.8–8.8)
RBC # BLD AUTO: 2.7 10E12/L (ref 4.4–5.9)
SODIUM SERPL-SCNC: 140 MMOL/L (ref 133–144)
TACROLIMUS BLD-MCNC: 18.7 UG/L (ref 5–15)
TME LAST DOSE: ABNORMAL H
WBC # BLD AUTO: 7.7 10E9/L (ref 4–11)

## 2019-01-02 PROCEDURE — 40000133 ZZH STATISTIC OT WARD VISIT: Performed by: OCCUPATIONAL THERAPIST

## 2019-01-02 PROCEDURE — 25000132 ZZH RX MED GY IP 250 OP 250 PS 637: Mod: GY | Performed by: TRANSPLANT SURGERY

## 2019-01-02 PROCEDURE — 97116 GAIT TRAINING THERAPY: CPT | Mod: GP

## 2019-01-02 PROCEDURE — 25000132 ZZH RX MED GY IP 250 OP 250 PS 637: Mod: GY | Performed by: SURGERY

## 2019-01-02 PROCEDURE — 00000146 ZZHCL STATISTIC GLUCOSE BY METER IP

## 2019-01-02 PROCEDURE — 36415 COLL VENOUS BLD VENIPUNCTURE: CPT | Performed by: INTERNAL MEDICINE

## 2019-01-02 PROCEDURE — 25000131 ZZH RX MED GY IP 250 OP 636 PS 637: Mod: GY | Performed by: PHYSICIAN ASSISTANT

## 2019-01-02 PROCEDURE — 97530 THERAPEUTIC ACTIVITIES: CPT | Mod: GP

## 2019-01-02 PROCEDURE — A9270 NON-COVERED ITEM OR SERVICE: HCPCS | Mod: GY | Performed by: SURGERY

## 2019-01-02 PROCEDURE — 83735 ASSAY OF MAGNESIUM: CPT | Performed by: INTERNAL MEDICINE

## 2019-01-02 PROCEDURE — 80197 ASSAY OF TACROLIMUS: CPT | Performed by: PHYSICIAN ASSISTANT

## 2019-01-02 PROCEDURE — 40000193 ZZH STATISTIC PT WARD VISIT

## 2019-01-02 PROCEDURE — 25000132 ZZH RX MED GY IP 250 OP 250 PS 637: Mod: GY | Performed by: NURSE PRACTITIONER

## 2019-01-02 PROCEDURE — 84100 ASSAY OF PHOSPHORUS: CPT | Performed by: INTERNAL MEDICINE

## 2019-01-02 PROCEDURE — A9270 NON-COVERED ITEM OR SERVICE: HCPCS | Mod: GY | Performed by: PHYSICIAN ASSISTANT

## 2019-01-02 PROCEDURE — 25000131 ZZH RX MED GY IP 250 OP 636 PS 637: Mod: GY | Performed by: NURSE PRACTITIONER

## 2019-01-02 PROCEDURE — 80076 HEPATIC FUNCTION PANEL: CPT | Performed by: INTERNAL MEDICINE

## 2019-01-02 PROCEDURE — A9270 NON-COVERED ITEM OR SERVICE: HCPCS | Mod: GY | Performed by: NURSE PRACTITIONER

## 2019-01-02 PROCEDURE — T1013 SIGN LANG/ORAL INTERPRETER: HCPCS | Mod: U3

## 2019-01-02 PROCEDURE — 25000131 ZZH RX MED GY IP 250 OP 636 PS 637: Mod: GY | Performed by: SURGERY

## 2019-01-02 PROCEDURE — 97140 MANUAL THERAPY 1/> REGIONS: CPT | Mod: GO | Performed by: OCCUPATIONAL THERAPIST

## 2019-01-02 PROCEDURE — 25000132 ZZH RX MED GY IP 250 OP 250 PS 637: Mod: GY | Performed by: PHYSICIAN ASSISTANT

## 2019-01-02 PROCEDURE — 80048 BASIC METABOLIC PNL TOTAL CA: CPT | Performed by: INTERNAL MEDICINE

## 2019-01-02 PROCEDURE — 85027 COMPLETE CBC AUTOMATED: CPT | Performed by: INTERNAL MEDICINE

## 2019-01-02 PROCEDURE — 12000026 ZZH R&B TRANSPLANT

## 2019-01-02 PROCEDURE — 25000125 ZZHC RX 250: Performed by: PHYSICIAN ASSISTANT

## 2019-01-02 RX ORDER — OXYCODONE HYDROCHLORIDE 5 MG/1
5 TABLET ORAL EVERY 4 HOURS PRN
Status: DISCONTINUED | OUTPATIENT
Start: 2019-01-02 | End: 2019-01-07 | Stop reason: HOSPADM

## 2019-01-02 RX ORDER — NALOXONE HYDROCHLORIDE 0.4 MG/ML
.1-.4 INJECTION, SOLUTION INTRAMUSCULAR; INTRAVENOUS; SUBCUTANEOUS
Status: DISCONTINUED | OUTPATIENT
Start: 2019-01-02 | End: 2019-01-07 | Stop reason: HOSPADM

## 2019-01-02 RX ORDER — POLYETHYLENE GLYCOL 3350 17 G/17G
17 POWDER, FOR SOLUTION ORAL 2 TIMES DAILY
Status: DISCONTINUED | OUTPATIENT
Start: 2019-01-02 | End: 2019-01-07 | Stop reason: HOSPADM

## 2019-01-02 RX ORDER — TACROLIMUS 5 MG/1
5 CAPSULE ORAL
Status: DISCONTINUED | OUTPATIENT
Start: 2019-01-02 | End: 2019-01-03

## 2019-01-02 RX ADMIN — SENNOSIDES AND DOCUSATE SODIUM 2 TABLET: 8.6; 5 TABLET ORAL at 08:28

## 2019-01-02 RX ADMIN — SIMETHICONE CHEW TAB 80 MG 80 MG: 80 TABLET ORAL at 08:29

## 2019-01-02 RX ADMIN — INSULIN GLARGINE 38 UNITS: 100 INJECTION, SOLUTION SUBCUTANEOUS at 16:25

## 2019-01-02 RX ADMIN — HUMAN INSULIN 7 UNITS/HR: 100 INJECTION, SOLUTION SUBCUTANEOUS at 12:21

## 2019-01-02 RX ADMIN — METOPROLOL TARTRATE 75 MG: 50 TABLET ORAL at 08:26

## 2019-01-02 RX ADMIN — PANTOPRAZOLE SODIUM 40 MG: 40 TABLET, DELAYED RELEASE ORAL at 08:25

## 2019-01-02 RX ADMIN — ASPIRIN 325 MG: 325 TABLET, DELAYED RELEASE ORAL at 08:25

## 2019-01-02 RX ADMIN — MYCOPHENOLATE MOFETIL 1000 MG: 250 CAPSULE ORAL at 18:39

## 2019-01-02 RX ADMIN — MULTIPLE VITAMINS W/ MINERALS TAB 1 TABLET: TAB at 08:27

## 2019-01-02 RX ADMIN — CLOTRIMAZOLE 1 TROCHE: 10 LOZENGE ORAL at 20:07

## 2019-01-02 RX ADMIN — CLOTRIMAZOLE 1 TROCHE: 10 LOZENGE ORAL at 16:25

## 2019-01-02 RX ADMIN — TACROLIMUS 5 MG: 5 CAPSULE ORAL at 18:39

## 2019-01-02 RX ADMIN — SIMETHICONE CHEW TAB 80 MG 80 MG: 80 TABLET ORAL at 20:07

## 2019-01-02 RX ADMIN — METOPROLOL TARTRATE 75 MG: 50 TABLET ORAL at 20:07

## 2019-01-02 RX ADMIN — CLOTRIMAZOLE 1 TROCHE: 10 LOZENGE ORAL at 12:40

## 2019-01-02 RX ADMIN — TACROLIMUS 6 MG: 5 CAPSULE ORAL at 08:23

## 2019-01-02 RX ADMIN — SIMETHICONE CHEW TAB 80 MG 80 MG: 80 TABLET ORAL at 12:40

## 2019-01-02 RX ADMIN — CLOTRIMAZOLE 1 TROCHE: 10 LOZENGE ORAL at 08:30

## 2019-01-02 RX ADMIN — ALFUZOSIN HYDROCHLORIDE 10 MG: 10 TABLET, EXTENDED RELEASE ORAL at 08:25

## 2019-01-02 RX ADMIN — MYCOPHENOLATE MOFETIL 1000 MG: 250 CAPSULE ORAL at 08:23

## 2019-01-02 RX ADMIN — SULFAMETHOXAZOLE AND TRIMETHOPRIM 1 TABLET: 400; 80 TABLET ORAL at 08:24

## 2019-01-02 RX ADMIN — AMLODIPINE BESYLATE 10 MG: 10 TABLET ORAL at 08:26

## 2019-01-02 RX ADMIN — VALGANCICLOVIR 450 MG: 450 TABLET, FILM COATED ORAL at 08:24

## 2019-01-02 ASSESSMENT — ACTIVITIES OF DAILY LIVING (ADL)
ADLS_ACUITY_SCORE: 12

## 2019-01-02 ASSESSMENT — MIFFLIN-ST. JEOR
SCORE: 1625.42
SCORE: 1619.63

## 2019-01-02 NOTE — PLAN OF CARE
Discharge Planner PT  7A  Patient plan for discharge: Home  Current status: Pt very motivated to participate in therapy and perform all mobility as independently as possible. Pt performs supine>sit via log roll technique with SBA, sit<>stand SBA-independent. Pt ambulates 200ft + 350ft with SBA, occasional standing rest breaks 2/2 fatigue and SOB. Pt ascends/descends 4 stairsx2 with CGA-SBA and 1 hand rail. Pt on RA with O2 sats 97% and above.   Barriers to return to prior living situation: Medical status, decreased activity tolerance, abdominal precautions  Recommendations for discharge: Home with assist + HHPT  Rationale for recommendations: Pt progressing well in regards to functional mobility, recommend assist for heavier ADLs. Pt would benefit from HHPT services to progress strength and endurance.        Entered by: Nubia Up 01/02/2019 1:40 PM

## 2019-01-02 NOTE — PLAN OF CARE
BPs 150/70s, all other vital signs stable. Patient complaining of constipation, received Miralax and suppository on day shift, Milk of Magnesia and scheduled Senokot on evening shift. Up for walk in hallway with family, passing gas, one small BM late in shift. Fair appetite on regular diet. Simethicone given for gas pain. Blood sugars ranged from 124-190 on insulin drip using algorithm #3. Updated daughter Kaylene on plan of care.

## 2019-01-02 NOTE — PROGRESS NOTES
Post ERCP (12/28/2018) with Dr. Lawler while inpatient: Follow-up    Post procedure recommendations: - Repeat ERCP in 8 weeks for stenosis reinterrogation   - The findings and recommendations were discussed with the patient and their family     Orders placed: ERCP and sent to scheduling.     Renae WEBSTER RN Coordinator  Dr. Astorga, Dr. Lawler & Dr. Araujo  [facility]  [phone]

## 2019-01-02 NOTE — TELEPHONE ENCOUNTER
I had the pleasure of meeting with Loy Limon in hospital today for medication teaching. Also reviewed discharge process and getting refills. Also present was .   Reviewed all transplant medications. Included names, uses, doses, schedule, duration and primary AE's.   Reviewed all other medications. Included names, uses, doses, schedule, duration and primary AE's.   Patient was able to repeat names of anti-rejection medications by end of session.   Additional teaching points:   Avoid Grapefruit and Grapefruit juice.   Avoid Herbal products unless approved by Transplant team.   Did patient view DVD or [employer]  Verified contact information.   Patient will see [facility] pharmacist for LUIS MANUEL and at 3-4 month check up.   [place] review if specialty patient and to use one phone number listed on med box or magnet if using specialty.   Gave 7 day medication box, bp monitor and thermometer.   Patient did choose  to receive refills from  specialty pharmacy.   Concerns addressed today:  1. Language barrier. Reviewed using  with patient.   2. Patient will bring all old medications to clinic appt so it can be reviewed with nurse.  Also will bring all new medications.    3. Did not teach patient how to use bp monitor.  needed to leave for next appt.  Told patient to bring bp monitor to clinic for teaching or have nurse in hospital teach patient how to use.   No further questions for this pharmacist.     Kenrick Benitez, R.Ph.  [facility]  [phone] pager

## 2019-01-02 NOTE — PROGRESS NOTES
IP Diabetes Management  Daily Note           Assessment and Plan:   HPI: Pt is a 64 yo man with a history of Laennec's cirrhosis with HCC, portal hypertension, latent TB, now s/p disease donor liver transplant on 12/22/2018.  He has had post-transplant hyperglycemia related to prednisone and IV solumedrol administration requiring transition to IV insulin 12/30. Undergoing liver biopsy to assess for rejection, and has recieved IV Solumedrol dosing  12/30, 12/31, and 1/1.    Assessment:   Pre-Diabetes (A1c 5.8%), with post transplant hyperglycemia related to steroids and immunosuppression meds.     Pt received steroids 12/30-1/1 with near doubling in his IV insulin requirements temporarily.     Plan:    -transition off insulin infusion today- continue infusion for two hours after Lantus given    -Lantus 38 units x1 this afternoon- will determine further dosing pending trend in the morning (will likely need less as steroids wash out of his system)   -increase aspart to 1:6 CHO coverage with meals and snacks/supplements   -aspart high intensity correction scale    -BG monitoring TID AC, HS, and 0200   -consistent with carb counting protocol   -nutritionist consulted for carb counting/consistent carb education   -CDE consulted for insulin teaching.     Plan discussed with patient with use of professional .    Interval History and Assessment: interval glucose trend reviewed: pt remains on insulin infusion, with good BG control on 1.5-3 units/hour during the day; increased to 7 units per hour this afternoon for BG in the 200's; suspect carb coverage was too lenient. Discussed at length with patient today that he will need to continue on insulin at least in the short term. He is receptive to dietary changes and requested to have dietician see him while he is hospitalized. He is agreeable to titration off the insulin infusion today. He is not sure what the timing of his discharge will be, reports that it  is dependent on his swelling, an how he works with PT.     Current nutritional intake and type: Orders Placed This Encounter      Regular Diet Adult      Planned Procedures/surgeries: none planned at the moment  Steroid planning: received IV solumedrol 1000mg 12/30, 12/31, 1/1. Also on Tacrolimus and Mycophenolate.       PTA Diabetes Regimen: n/a, prediabetic    Discharge Planning: pending PT eval and clinical stability.            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           Medications:     Current Facility-Administered Medications   Medication     alfuzosin ER (UROXATRAL) 24 hr tablet 10 mg     amLODIPine (NORVASC) tablet 10 mg     aspirin (ASA) EC tablet 325 mg     bisacodyl (DULCOLAX) Suppository 10 mg     bisacodyl (DULCOLAX) Suppository 10 mg     calcium carbonate (TUMS) chewable tablet 500 mg     clotrimazole (MYCELEX) lozenge 1 Giancarlo     dextrose 10 % 1,000 mL infusion     glucose gel 15-30 g    Or     dextrose 50 % injection 25-50 mL    Or     glucagon injection 1 mg     insulin 1 unit/mL in saline (NovoLIN, HumuLIN Regular) drip - ADULT IV Infusion     insulin aspart (NovoLOG) inj (RAPID ACTING)     insulin aspart (NovoLOG) inj (RAPID ACTING)     insulin aspart (NovoLOG) inj (RAPID ACTING)     insulin aspart (NovoLOG) inj (RAPID ACTING)     insulin glargine (LANTUS PEN) injection 38 Units     ipratropium - albuterol 0.5 mg/2.5 mg/3 mL (DUONEB) neb solution 3 mL     magic mouthwash suspension (diphenhydramine, lidocaine, aluminum-magnesium & simethicone)     magnesium hydroxide (MILK OF MAGNESIA) suspension 30 mL     Med Instruction - Transition from IV Insulin Infusion to Sub-Q Insulin     metoprolol tartrate (LOPRESSOR) tablet 75 mg     multivitamin w/minerals  "(THERA-VIT-M) tablet 1 tablet     mycophenolate (GENERIC EQUIVALENT) capsule 1,000 mg     naloxone (NARCAN) injection 0.1-0.4 mg     ondansetron (ZOFRAN) injection 4 mg     oxyCODONE (ROXICODONE) tablet 5 mg     pantoprazole (PROTONIX) EC tablet 40 mg     polyethylene glycol (MIRALAX/GLYCOLAX) Packet 17 g     senna-docusate (SENOKOT-S/PERICOLACE) 8.6-50 MG per tablet 2 tablet     simethicone (MYLICON) chewable tablet 80 mg     sodium chloride (PF) 0.9% PF flush 3 mL     sodium chloride (PF) 0.9% PF flush 3 mL     sodium chloride (PF) 0.9% PF flush 3 mL     sodium chloride 0.9% infusion     sulfamethoxazole-trimethoprim (BACTRIM/SEPTRA) 400-80 MG per tablet 1 tablet     tacrolimus (GENERIC EQUIVALENT) capsule 5 mg     valGANciclovir (VALCYTE) tablet 450 mg            Physical Exam:    /74 (BP Location: Left arm)   Pulse 73   Temp 98.3  F (36.8  C) (Oral)   Resp 18   Ht 1.702 m (5' 7\")   Wt 88.2 kg (194 lb 6.4 oz)   SpO2 98%   BMI 30.45 kg/m    General: pleasant, in no distress, sitting up in chair.   HEENT: normocephalic, atraumatic. Oral mucous membranes moist.   Lungs: unlabored respiration, no cough  ABD: rounded, soft, no lipodystrophy noted  Skin: warm and dry, no obvious lesions  MSK:  moves all extremities  Lymp:  no LE edema   Mental status:  alert, oriented to self, place, time  Psych:  affect, calm and appropriate interaction             Data:     Recent Labs   Lab 01/02/19  1418 01/02/19  1252 01/02/19  1209 01/02/19  1104 01/02/19  1008 01/02/19  0901  01/02/19  0605  01/01/19  0612  12/31/18  0536  12/30/18  0549  12/29/18  0636  12/28/18  0556   GLC  --   --   --   --   --   --   --  139*  --  144*  --  150*  --  181*  --  169*  --  169*   * 250* 221* 220* 131* 114*   < >  --    < >  --    < >  --    < >  --    < >  --    < >  --     < > = values in this interval not displayed.     Lab Results   Component Value Date    WBC 7.7 01/02/2019    WBC 13.1 (H) 01/01/2019    WBC 4.3 " 12/31/2018    HGB 8.5 (L) 01/02/2019    HGB 10.0 (L) 01/01/2019    HGB 8.2 (L) 12/31/2018    HCT 26.3 (L) 01/02/2019    HCT 30.8 (L) 01/01/2019    HCT 25.0 (L) 12/31/2018    MCV 97 01/02/2019    MCV 97 01/01/2019    MCV 97 12/31/2018     01/02/2019     01/01/2019     12/31/2018     Lab Results   Component Value Date     01/02/2019     01/01/2019     12/31/2018    POTASSIUM 4.7 01/02/2019    POTASSIUM 4.4 01/01/2019    POTASSIUM 4.3 12/31/2018    CHLORIDE 108 01/02/2019    CHLORIDE 106 01/01/2019    CHLORIDE 108 12/31/2018    CO2 26 01/02/2019    CO2 26 01/01/2019    CO2 26 12/31/2018     (H) 01/02/2019     (H) 01/01/2019     (H) 12/31/2018     Lab Results   Component Value Date    BUN 29 01/02/2019    BUN 30 01/01/2019    BUN 26 12/31/2018     Lab Results   Component Value Date    TSH 6.36 (H) 07/19/2018     Lab Results   Component Value Date    AST 43 01/02/2019    AST 51 (H) 01/01/2019    AST 47 (H) 12/31/2018     (H) 01/02/2019     (H) 01/01/2019     (H) 12/31/2018    ALKPHOS 736 (H) 01/02/2019    ALKPHOS 926 (H) 01/01/2019    ALKPHOS 922 (H) 12/31/2018         Diabetes Management Team job code: [id number]  I spent a total of 25 minutes bedside and on the inpatient unit managing glycemic care. Over 50% of my time on the unit was spent counseling the patient and/or coordinating care regarding acute hyperglycemia management.  See note for details.    Roma Lira PA-C  Inpatient Diabetes Management Service  Pager [phone]

## 2019-01-02 NOTE — PROGRESS NOTES
Organ specific education completed, and discharge planning has been conducted with multidisciplinary transplant care team including physicians, pharmacy, nutrition, and social work.     Met with liver IRVIN and discharge coordinator, Nancy Ventura on 7A.  Discussed pertinent health issues related to liver transplant and discharge planning.  Disposition after discharge still to be determined as will meet w/ PT today to see if requires rehab.    Met with Johan and . I had also left a message w/ his daughter earlier today letting her know that I would like to speak to her re care after discharge, invited her to join us but she was not present.  I left my name and contact number on her voicemail and invited her to give me a call if she has concerns or questions.   Introduced myself and explained the role of the post liver transplant coordinator and support team.  Johan initially was more concerned about how he would have money for food and mortgage now that he has received a transplant.  I spoke to the  after my visit w/ Johan, she is aware of his concerns.  Briefly discussed the following topics:  1.) immunosuppression and the importance of taking regularly as directed.  2.) pertinent labs and their interpretation  3.) rejection signs / symptoms and treatment and 4.) Importance of long term follow-up with the transplant center and primary care physician.  Johan has a med card.  I instructed him to bring this to all appointments and make med changes on this card as soon as they happen.  Has lab book and understands responsibility of getting and recording results. Education in process.  (See inpatient education teaching flowsheet).  Notified Johan that he will have SIP[facility] / ATC visit 1 week after discharge from hospital or transitional care / rehab. Discharge education will be reinforced at subsequent clinic visits and during phone conversations.. Pt is aware that he needs a follow-up appointment with  "pre transplant primary care within 1-2 weeks of returning home.  Patient is aware that he will need follow-up with the transplant team for the rest of his life.  Initial follow-up will be with the transplant surgeon, then move to pre-transplant hepatologist.  Patient is aware that he will need lab testing, initially every Monday and Thursday to monitor liver function on a regular basis for the rest of his  life. He tells me that he generally comes to the [facility] for lab draws.  I told him that it is likely that he will have a homecare nurse come to his phone for as long as he is homebound to do lab draws.  He does have a care and will otherwise drive himself here to labs.  Gave pt handouts entitled \"Things to Remember\" and \"Your Lab Results\" as well as a copy of contact numbers for myself, social work, transplant LPN and after hours/ weekend / holiday phone numbers.  Pt denies further questions at this time.  Will meet again and talk over the phone to reinforce education and answer questions when patient returns for SIPC / ATC visit.  "

## 2019-01-02 NOTE — PROGRESS NOTES
Transplant Surgery  Inpatient Daily Progress Note  01/02/2019    Assessment & Plan: 62 yo M hx Laennec's cirrhosis with HCC. S/p DBD OLT with right inguinal hernia repair with biologic mesh 12/22/18. Notable for small arterial anastomosis (donor common hepatic with atherosclerosis to recipient replaced right hepatic).    Graft function: POD #11. LFTs trending down. Received empiric treatment for rejection with steroids 12/30-1/1. 12/31 biopsy: no rejection, mild portal and pericellular fibrosis associated with hepatocellular   Atrophy, minimal portal inflammation and widespread bile ductular proliferation.  Biliary anastomotic stricture: 12/26 US: small fluid collection, otherwise normal. 12/27 MRCP conceringn for biliary anastomotic narrowing. 12/28 ERCP: benign appearing ampullary and anastomotic stenosis managed by biliary sphincterotomy and biliary stent placement.  Immunosuppression management: Induction with steroid pulse, completed.  Methylpred: 1000mg 12/30, 1000mg 12/31, 1000mg 1/1  MMF: 1000mg BID  Tacro: Goal level 10-15.   Hematology:   Anemia: Hgb 8.5, stable  Vascular prophylaxis: Due to small HA anastomosis. Received dextran X48 hrs post-op, now on aspirin 325.   Cardiorespiratory:   Post operative atrial fibrillation with RVR: On 12/25. Started on scheduled metoprolol and amio bolus/drip. Trops low/negative. 12/25 Echo: severely dilated left atrium, mild to moderate LVH, preserved EF. Converted back to NSR 12/25 PM. Cardiology consulted. Recommend continue rate control with metoprolol 75 mg BID, no indication for anticoagulation.   NSVT:  2 short runs documented on tele. Cards recommended no further cardiac testing or management.   Hypoxia/dyspnea/reactive airways: Post-op, now resolved. Continue PRN nebs.  HTN: Amlodipine 10mg and metoprolol 75mg oral BID. Pressures controlled  GI/Nutrition: eating well.  Constipation: Still requiring PRN suppositories and MOM. Continue senna BID, increase miralax  to BID. MOM prn. Added simethicone and PPI for bloating and reflux  Endocrine:   Hyperglycemia: A1c 5.8% on admission. Pre-diabetes with steroid induced hyperglycemia. Insulin gtt started due to solumedrol. Continue CC with meals. Endocrine consulted. DM education for insulin administration and glucose monitoring.  Fluid/Electrolytes:   Hypervolemia: BLE edema and scrotal edema. Lymphedema consult. Low albumin.  I<O without diuretics, monitor.  :   Hematuria: Developed 12/26 with manipulation of cota. Resolved. Cota now removed.  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  Prophylaxis: DVT, fall, clotrimazole, bactrim, valcyte  Disposition: 7A. PT/OT consult for therapy and assist with disposition. Awaiting recs. No help at home during the day.    Medical Decision Making: Medium    IRVIN/Fellow/Resident Provider: Inez Baker NP [id number]    Faculty: Elvia Field M.D.  __________________________________________________________________  Transplant History:   12/22/2018 (Liver), Postoperative day: 11     Interval History: Feeling better after aggressive bowel regimen and BMs yesterday.    ROS:   A 10-point review of systems was negative except as noted above.    Curent Meds:    alfuzosin ER  10 mg Oral Daily with breakfast     amLODIPine  10 mg Oral Daily     aspirin  325 mg Oral Daily     bisacodyl  10 mg Rectal Once     clotrimazole  1 Giancarlo Buccal 4x Daily     insulin aspart   Subcutaneous TID AC     metoprolol tartrate  75 mg Oral BID     multivitamin w/minerals  1 tablet Oral Daily     mycophenolate  1,000 mg Oral BID IS     pantoprazole  40 mg Oral QAM AC     polyethylene glycol  17 g Oral BID     senna-docusate  2 tablet Oral or Feeding Tube BID     simethicone  80 mg Oral 4x Daily     sodium chloride (PF)  3 mL Intravenous Q8H     sulfamethoxazole-trimethoprim  1 tablet Oral or Feeding Tube  "Daily     tacrolimus  6 mg Oral BID IS     valGANciclovir  450 mg Oral Daily       Physical Exam:     Admit Weight: 84.6 kg (186 lb 9.6 oz)    Current Vitals:   /74 (BP Location: Left arm)   Pulse 73   Temp 98.3  F (36.8  C) (Oral)   Resp 18   Ht 1.702 m (5' 7\")   Wt 88.2 kg (194 lb 6.4 oz)   SpO2 98%   BMI 30.45 kg/m      Vital sign ranges:    Temp:  [97.8  F (36.6  C)-98.3  F (36.8  C)] 98.3  F (36.8  C)  Heart Rate:  [53-70] 58  Resp:  [16-18] 18  BP: (115-156)/(57-74) 147/74  SpO2:  [96 %-100 %] 98 %  Patient Vitals for the past 24 hrs:   BP Temp Temp src Heart Rate Resp SpO2 Weight   01/02/19 1006 -- -- -- -- -- -- 88.2 kg (194 lb 6.4 oz)   01/02/19 0740 147/74 98.3  F (36.8  C) Oral 58 18 98 % --   01/02/19 0537 -- -- -- -- -- -- 87.6 kg (193 lb 2 oz)   01/02/19 0405 137/64 98  F (36.7  C) Oral 63 18 99 % --   01/01/19 2324 115/57 98.2  F (36.8  C) Oral 53 16 96 % --   01/01/19 2001 151/73 97.8  F (36.6  C) Oral 62 16 99 % --   01/01/19 1556 156/73 98  F (36.7  C) Oral 63 16 99 % --   01/01/19 1224 146/67 98.2  F (36.8  C) -- 70 16 100 % --     General Appearance: in no apparent distress.   Skin: normal, warm, dry, No rashes, induration, or jaundice  Heart: RRR  Lungs: RA, unlabored  Abdomen: abd soft, slightly distended. Incision c/d/i, staples in place. KARINE X2- serosang (lower removed today)  : The genitalia are edematous   Extremities: edema: present bilaterally. +1-2, wrapped. There is no skin breakdown.  Neurologic: awake, alert and oriented x4. Tremor absent.  PIV only      Data:   CMP  Recent Labs   Lab 01/02/19  0605 01/01/19  0612    140   POTASSIUM 4.7 4.4   CHLORIDE 108 106   CO2 26 26   * 144*   BUN 29 30   CR 0.76 0.90   GFRESTIMATED >90 89   GFRESTBLACK >90 >90   YELENA 7.8* 8.1*   MAG 2.2 2.1   PHOS 4.0 4.2   ALBUMIN 1.9* 2.2*   BILITOTAL 0.7 0.7   ALKPHOS 736* 926*   AST 43 51*   * 282*     CBC  Recent Labs   Lab 01/02/19  0605 01/01/19  0612   HGB 8.5* 10.0* "   WBC 7.7 13.1*    341     COAGS  Recent Labs   Lab 12/31/18  0715   INR 1.14      Urinalysis  Recent Labs   Lab Test 12/25/18  0732 12/22/18  0034  07/19/18  1133   COLOR Yellow Yellow   < > Yellow   APPEARANCE Slightly Cloudy Clear   < > Clear   URINEGLC Negative Negative   < > Negative   URINEBILI Small* Negative   < > Negative   URINEKETONE Negative Negative   < > Negative   SG 1.021 1.012   < > 1.009   UBLD Moderate* Trace*   < > Negative   URINEPH 6.0 6.5   < > 8.0*   PROTEIN 30* Negative   < > Negative   NITRITE Negative Negative   < > Negative   LEUKEST Negative Negative   < > Negative   RBCU 7* 3*   < >  --    WBCU 1 1   < >  --    UTPG  --   --   --  Unable to calculate due to low value    < > = values in this interval not displayed.     Virology:  Hepatitis C Antibody   Date Value Ref Range Status   12/22/2018 Nonreactive NR^Nonreactive Final     Comment:     Assay performance characteristics have not been established for newborns,   infants, and children

## 2019-01-02 NOTE — PLAN OF CARE
Edema 7A: Patient with continued 2+ pitting in distal legs, skin intact. Application of GCB from MTPs to knee creases for further management of LE edema. Remove wraps if causing pain/numbness or become soiled. Patient reports improved scrotal edema - recommend continued use of aakash scrotal support sling.

## 2019-01-02 NOTE — PLAN OF CARE
"2300  VS: /64 (BP Location: Left arm)   Pulse 73   Temp 98  F (36.7  C) (Oral)   Resp 18   Ht 1.702 m (5' 7\")   Wt 88.5 kg (195 lb 1.6 oz)   SpO2 99%   BMI 30.56 kg/m    VSS on RA  B -174 currently on algorithm #3  Pain/Nausea: C/O abdominal gas pain -went for a walk, denied nausea  Diet: Regular diet  LDA: 2 PIVs, 2 JPs - #2 215 mL serous output   GI: Voided adequate amounts  : No BM  Skin: Incision stapled open to air, biopsy site covered CDI  Mobility: Up with assist x 1  Plan: Continue to monitor    "

## 2019-01-03 ENCOUNTER — APPOINTMENT (OUTPATIENT)
Dept: OCCUPATIONAL THERAPY | Facility: CLINIC | Age: 64
DRG: 005 | End: 2019-01-03
Attending: TRANSPLANT SURGERY
Payer: MEDICARE

## 2019-01-03 PROBLEM — R73.03 PRE-DIABETES: Status: ACTIVE | Noted: 2019-01-03

## 2019-01-03 PROBLEM — T86.49 BILIARY STRICTURE OF TRANSPLANTED LIVER (H): Status: ACTIVE | Noted: 2019-01-03

## 2019-01-03 PROBLEM — K59.00 CONSTIPATION: Status: ACTIVE | Noted: 2019-01-03

## 2019-01-03 PROBLEM — I10 ESSENTIAL HYPERTENSION: Status: ACTIVE | Noted: 2019-01-03

## 2019-01-03 PROBLEM — R73.9 HYPERGLYCEMIA: Status: ACTIVE | Noted: 2019-01-03

## 2019-01-03 PROBLEM — K83.1 BILIARY STRICTURE OF TRANSPLANTED LIVER (H): Status: ACTIVE | Noted: 2019-01-03

## 2019-01-03 LAB
ALBUMIN SERPL-MCNC: 1.7 G/DL (ref 3.4–5)
ALP SERPL-CCNC: 608 U/L (ref 40–150)
ALT SERPL W P-5'-P-CCNC: 167 U/L (ref 0–70)
ANION GAP SERPL CALCULATED.3IONS-SCNC: 5 MMOL/L (ref 3–14)
AST SERPL W P-5'-P-CCNC: 25 U/L (ref 0–45)
BILIRUB DIRECT SERPL-MCNC: 0.4 MG/DL (ref 0–0.2)
BILIRUB SERPL-MCNC: 0.5 MG/DL (ref 0.2–1.3)
BUN SERPL-MCNC: 28 MG/DL (ref 7–30)
CALCIUM SERPL-MCNC: 7.4 MG/DL (ref 8.5–10.1)
CHLORIDE SERPL-SCNC: 109 MMOL/L (ref 94–109)
CO2 SERPL-SCNC: 25 MMOL/L (ref 20–32)
CREAT SERPL-MCNC: 0.87 MG/DL (ref 0.66–1.25)
ERYTHROCYTE [DISTWIDTH] IN BLOOD BY AUTOMATED COUNT: 14.8 % (ref 10–15)
GFR SERPL CREATININE-BSD FRML MDRD: >90 ML/MIN/{1.73_M2}
GLUCOSE BLDC GLUCOMTR-MCNC: 119 MG/DL (ref 70–99)
GLUCOSE BLDC GLUCOMTR-MCNC: 131 MG/DL (ref 70–99)
GLUCOSE BLDC GLUCOMTR-MCNC: 148 MG/DL (ref 70–99)
GLUCOSE BLDC GLUCOMTR-MCNC: 195 MG/DL (ref 70–99)
GLUCOSE BLDC GLUCOMTR-MCNC: 45 MG/DL (ref 70–99)
GLUCOSE BLDC GLUCOMTR-MCNC: 78 MG/DL (ref 70–99)
GLUCOSE BLDC GLUCOMTR-MCNC: 98 MG/DL (ref 70–99)
GLUCOSE SERPL-MCNC: 68 MG/DL (ref 70–99)
HCT VFR BLD AUTO: 25.3 % (ref 40–53)
HGB BLD-MCNC: 8.1 G/DL (ref 13.3–17.7)
MAGNESIUM SERPL-MCNC: 2.2 MG/DL (ref 1.6–2.3)
MCH RBC QN AUTO: 31.5 PG (ref 26.5–33)
MCHC RBC AUTO-ENTMCNC: 32 G/DL (ref 31.5–36.5)
MCV RBC AUTO: 98 FL (ref 78–100)
PHOSPHATE SERPL-MCNC: 2.7 MG/DL (ref 2.5–4.5)
PLATELET # BLD AUTO: 248 10E9/L (ref 150–450)
POTASSIUM SERPL-SCNC: 4.6 MMOL/L (ref 3.4–5.3)
PROT SERPL-MCNC: 4.5 G/DL (ref 6.8–8.8)
RBC # BLD AUTO: 2.57 10E12/L (ref 4.4–5.9)
SODIUM SERPL-SCNC: 139 MMOL/L (ref 133–144)
TACROLIMUS BLD-MCNC: 11.1 UG/L (ref 5–15)
TME LAST DOSE: NORMAL H
WBC # BLD AUTO: 6.8 10E9/L (ref 4–11)

## 2019-01-03 PROCEDURE — 40000133 ZZH STATISTIC OT WARD VISIT

## 2019-01-03 PROCEDURE — 25000131 ZZH RX MED GY IP 250 OP 636 PS 637: Mod: GY | Performed by: NURSE PRACTITIONER

## 2019-01-03 PROCEDURE — 12000026 ZZH R&B TRANSPLANT

## 2019-01-03 PROCEDURE — 25000132 ZZH RX MED GY IP 250 OP 250 PS 637: Mod: GY | Performed by: PHYSICIAN ASSISTANT

## 2019-01-03 PROCEDURE — 36415 COLL VENOUS BLD VENIPUNCTURE: CPT | Performed by: INTERNAL MEDICINE

## 2019-01-03 PROCEDURE — 83735 ASSAY OF MAGNESIUM: CPT | Performed by: INTERNAL MEDICINE

## 2019-01-03 PROCEDURE — A9270 NON-COVERED ITEM OR SERVICE: HCPCS | Mod: GY | Performed by: SURGERY

## 2019-01-03 PROCEDURE — A9270 NON-COVERED ITEM OR SERVICE: HCPCS | Mod: GY | Performed by: PHYSICIAN ASSISTANT

## 2019-01-03 PROCEDURE — 80048 BASIC METABOLIC PNL TOTAL CA: CPT | Performed by: INTERNAL MEDICINE

## 2019-01-03 PROCEDURE — 25000132 ZZH RX MED GY IP 250 OP 250 PS 637: Mod: GY | Performed by: TRANSPLANT SURGERY

## 2019-01-03 PROCEDURE — 00000146 ZZHCL STATISTIC GLUCOSE BY METER IP

## 2019-01-03 PROCEDURE — 80076 HEPATIC FUNCTION PANEL: CPT | Performed by: INTERNAL MEDICINE

## 2019-01-03 PROCEDURE — 80197 ASSAY OF TACROLIMUS: CPT | Performed by: PHYSICIAN ASSISTANT

## 2019-01-03 PROCEDURE — 84100 ASSAY OF PHOSPHORUS: CPT | Performed by: INTERNAL MEDICINE

## 2019-01-03 PROCEDURE — 25000131 ZZH RX MED GY IP 250 OP 636 PS 637: Mod: GY | Performed by: SURGERY

## 2019-01-03 PROCEDURE — 25000132 ZZH RX MED GY IP 250 OP 250 PS 637: Mod: GY | Performed by: SURGERY

## 2019-01-03 PROCEDURE — A9270 NON-COVERED ITEM OR SERVICE: HCPCS | Mod: GY | Performed by: NURSE PRACTITIONER

## 2019-01-03 PROCEDURE — 25000132 ZZH RX MED GY IP 250 OP 250 PS 637: Mod: GY | Performed by: NURSE PRACTITIONER

## 2019-01-03 PROCEDURE — 97530 THERAPEUTIC ACTIVITIES: CPT | Mod: GO

## 2019-01-03 PROCEDURE — 97535 SELF CARE MNGMENT TRAINING: CPT | Mod: GO

## 2019-01-03 PROCEDURE — 85027 COMPLETE CBC AUTOMATED: CPT | Performed by: INTERNAL MEDICINE

## 2019-01-03 RX ORDER — FUROSEMIDE 40 MG
40 TABLET ORAL ONCE
Status: COMPLETED | OUTPATIENT
Start: 2019-01-03 | End: 2019-01-03

## 2019-01-03 RX ADMIN — AMLODIPINE BESYLATE 10 MG: 10 TABLET ORAL at 08:35

## 2019-01-03 RX ADMIN — CLOTRIMAZOLE 1 TROCHE: 10 LOZENGE ORAL at 08:36

## 2019-01-03 RX ADMIN — SIMETHICONE CHEW TAB 80 MG 80 MG: 80 TABLET ORAL at 16:45

## 2019-01-03 RX ADMIN — ALFUZOSIN HYDROCHLORIDE 10 MG: 10 TABLET, EXTENDED RELEASE ORAL at 08:36

## 2019-01-03 RX ADMIN — TACROLIMUS 6 MG: 5 CAPSULE ORAL at 18:46

## 2019-01-03 RX ADMIN — SIMETHICONE CHEW TAB 80 MG 80 MG: 80 TABLET ORAL at 11:31

## 2019-01-03 RX ADMIN — METOPROLOL TARTRATE 75 MG: 50 TABLET ORAL at 08:35

## 2019-01-03 RX ADMIN — SENNOSIDES AND DOCUSATE SODIUM 2 TABLET: 8.6; 5 TABLET ORAL at 19:51

## 2019-01-03 RX ADMIN — TACROLIMUS 5 MG: 5 CAPSULE ORAL at 08:36

## 2019-01-03 RX ADMIN — POLYETHYLENE GLYCOL 3350 17 G: 17 POWDER, FOR SOLUTION ORAL at 08:57

## 2019-01-03 RX ADMIN — ASPIRIN 325 MG: 325 TABLET, DELAYED RELEASE ORAL at 08:36

## 2019-01-03 RX ADMIN — SIMETHICONE CHEW TAB 80 MG 80 MG: 80 TABLET ORAL at 19:51

## 2019-01-03 RX ADMIN — SIMETHICONE CHEW TAB 80 MG 80 MG: 80 TABLET ORAL at 08:35

## 2019-01-03 RX ADMIN — OXYCODONE HYDROCHLORIDE 5 MG: 5 TABLET ORAL at 22:55

## 2019-01-03 RX ADMIN — VALGANCICLOVIR 450 MG: 450 TABLET, FILM COATED ORAL at 08:36

## 2019-01-03 RX ADMIN — MULTIPLE VITAMINS W/ MINERALS TAB 1 TABLET: TAB at 08:36

## 2019-01-03 RX ADMIN — METOPROLOL TARTRATE 75 MG: 50 TABLET ORAL at 19:51

## 2019-01-03 RX ADMIN — MYCOPHENOLATE MOFETIL 1000 MG: 250 CAPSULE ORAL at 18:46

## 2019-01-03 RX ADMIN — PANTOPRAZOLE SODIUM 40 MG: 40 TABLET, DELAYED RELEASE ORAL at 08:36

## 2019-01-03 RX ADMIN — POLYETHYLENE GLYCOL 3350 17 G: 17 POWDER, FOR SOLUTION ORAL at 19:52

## 2019-01-03 RX ADMIN — FUROSEMIDE 40 MG: 40 TABLET ORAL at 11:31

## 2019-01-03 RX ADMIN — OXYCODONE HYDROCHLORIDE 5 MG: 5 TABLET ORAL at 01:49

## 2019-01-03 RX ADMIN — OXYCODONE HYDROCHLORIDE 5 MG: 5 TABLET ORAL at 16:49

## 2019-01-03 RX ADMIN — CLOTRIMAZOLE 1 TROCHE: 10 LOZENGE ORAL at 16:45

## 2019-01-03 RX ADMIN — CLOTRIMAZOLE 1 TROCHE: 10 LOZENGE ORAL at 19:51

## 2019-01-03 RX ADMIN — MYCOPHENOLATE MOFETIL 1000 MG: 250 CAPSULE ORAL at 08:35

## 2019-01-03 RX ADMIN — CLOTRIMAZOLE 1 TROCHE: 10 LOZENGE ORAL at 11:31

## 2019-01-03 RX ADMIN — SULFAMETHOXAZOLE AND TRIMETHOPRIM 1 TABLET: 400; 80 TABLET ORAL at 08:35

## 2019-01-03 ASSESSMENT — ACTIVITIES OF DAILY LIVING (ADL)
ADLS_ACUITY_SCORE: 13
ADLS_ACUITY_SCORE: 13
ADLS_ACUITY_SCORE: 12
ADLS_ACUITY_SCORE: 13

## 2019-01-03 ASSESSMENT — MIFFLIN-ST. JEOR: SCORE: 1629.5

## 2019-01-03 NOTE — PLAN OF CARE
VS: VSS, afebrile, on RA  Mental Status: A&Ox4; Algerian speaking baseline, proficient in english   Cardiac: bradycardiac intermittent; denies chest pain   Respiratory: LS clear, diminished, bilaterally, denies SOB  GI/: voiding adequate amount urine; abdomen distended, rounded; + bowel tones; BM x1; liver transplant incision and inguinal incision approximated with staples/scant drainage present; KARINE patent to serous/urine fluid (sticky note left with MD team to address; verbal report given to FIFI Daniels)   Pain: + abdominal pain; bilateral lower extremity pain secondary to fluid overload; PRN oxycodone given x1 w/ relief   Diet: Tolerating regular diet; , 119  Mobility: Up with SBA; calling appropriately   Treatment: continue post transplant care; lymphedema wrapped/bilateral lower extremity and scrotal edema   DC plan: plan to discharge home with HHPT; per PT evaluation

## 2019-01-03 NOTE — PROGRESS NOTES
Care Coordinator    Care Coordinator - Discharge Planning    Admission Date/Time:  12/21/2018  Attending MD:  Emil Echevarria MD     Data  Date of initial CC assessment:  1.3.19  Chart reviewed, discussed with interdisciplinary team.   Patient was admitted for:   1. Liver transplant recipient (H)    2. Liver transplant candidate    3. Immunosuppressed status (H)    Assessment & Plan: 64 yo M hx Laennec's cirrhosis with HCC. S/p DBD OLT with right inguinal hernia repair with biologic mesh 12/22/18. Notable for small arterial anastomosis (donor common hepatic with atherosclerosis to recipient replaced right hepatic).   Graft function: POD #12. LFTs trending down. Received empiric treatment for rejection with steroids 12/30-1/1. 12/31 biopsy: no rejection, mild portal and pericellular fibrosis associated with hepatocellular atrophy, minimal portal inflammation and widespread bile ductular proliferation.  Biliary anastomotic stricture: 12/26 US: small fluid collection, otherwise normal. 12/27 MRCP conceringn for biliary anastomotic narrowing. 12/28 ERCP: benign appearing ampullary and anastomotic stenosis managed by biliary sphincterotomy and biliary stent placement. Next ERCP end of February--per Inez Baker NP, note.    Assessment   Concerns with insurance coverage for discharge needs: unable to pay bills.  Current Living Situation: Patient lives with spouse: Rina--she only speaks Russian and is illiterate in Russian both reading and writing  She can accompany him to appointments per Kaylene  Support System: Supportive and Involved  Services Involved: Home Care  Transportation at Discharge: [place] transportation,  [facility] [phone]  Transportation to Medical Appointments:    - Name of caregiver: Rina Kruse and daughter Kaylene Limon  Barriers to Discharge: pain/OSKAR/transplant education/Diabetic ed    Coordination of Care and Referrals: Provided patient/family with options for Home Care.  I met with  JOY heard, Elise, and Inez Baker,NP, and , in pt's room this morning. Pt is pleasant and seems to have a good attitude and wants to heal and get back to work. Pt is most concerned with his finances. He sent in his January premium for [employer] approx 12/25/18--so next payment will be due 2/4/19 (he always sends his check 5 days early) and he understands that he must continue paying his insurance, to cover his medical expenses and his immunosuppression meds. Elise will assist with funds for insurance premium payments-- I have informed his daughter Sia DE: Catia Luo came to teach insulin and I let her use the --she needs another session with him.  I called Kaylene and  explained my role and went over discharge routine. She is very pleasant. She works until 4:30pm every day and has to work this weekend. She has 4 kids and her  frequently works overnights. They live in their own home. Her niece is on spring break and will ask her to come for the PLC teach 1/4/ @ 5pm @ bedside.  I explained that on lab draw days (ATC and HHN visit days M-Th, they wait to take their immunosuppression pills until their labs are drawn and then they take them (bring them with to the ATC clinic). Kaylene agrees with [facility] and they will visit the day after discharge. I have verified address and phone #. I have sent in referral to [facility] for RN and Home PT.  Pt's Outpatient Care Coordinator is: Jeny Szymanski. Pt is to call OP CC with questions or concerns and notify them if they develop vomiting or diarrhea right away, as pt may need to be readmitted to determine cause and get IV immunosuppression/ or hydration.  No lifting over 10 lbs x 6 weeks and no driving for at least 2 weeks, and then have to be off narcotics and reflexes back to normal. Stay hydrated with 1-2 liters of water QD. Avoid ill people and frequent handwashing to prevent illness.  [place]: [place] Jaswinder Anne at [facility]--pt plans to come to the OP lab @ [facility] when  HHN visits are completed  [facility] Transplant surgeon: Emil Echevarria    I explained to Kaylene that all this information will be on the discharge orders.  Plan  Anticipated Discharge Date:  Monday, 1/7/19--per Uma HERRERA charge nurse--ATC clinic is full.  Anticipated Discharge Plan:  Monday, 1/7/19 per Inez Baker NP, to continue teaching and have [facility] see him 1/8/19, then schedule pt for ATC on Monday, 1/14/19 @ 0900    CTS Handoff completed:  YES    Nancy Ventura, DALLIN

## 2019-01-03 NOTE — PLAN OF CARE
Vital signs stable. Patient alert, oriented, and up SBA. Ambulated in halls. No complaints of pain or nausea. Tolerating regular diet; glu range  on insulin drip alg #3. Lantus 38u administered; insulin drip shut off at 1830. Will continue sliding scale and CC. PIV now SL. Lower KARINE removed. Right KARINE intact with serous output. Incision intact with staples and tape burns; cleansed with microklenz. Voiding and stooling independently. Will continue to monitor.

## 2019-01-03 NOTE — PLAN OF CARE
"/65 (BP Location: Left arm)   Pulse 55   Temp 98  F (36.7  C) (Oral)   Resp 16   Ht 1.702 m (5' 7\")   Wt 88.6 kg (195 lb 4.8 oz)   SpO2 96%   BMI 30.59 kg/m      Patient VSS on RA, afebrile. BG 69--98, no sliding scale given, hypoglycemia managed with breakfast, 7 units novolog given with lunch. Denies pain, some tightness in legs d/t edema. Tolerating regular diet with good appetite. 2x bilat PIV- SL. No BM, AM miralax given. Voiding spontaneously, saving sometimes. 3+ edema in legs, bilat lymphedema wraps on. Midline incision CDI, abdominal scabbing/erythema. R KARINE 250 ml serous output. 40 mg oral lasix given today. Up in hallway for walk once today. Diabetes education started, nutrition education for carb counting, plan to continue tomorrow. Continue to promote activity and encourage blood glucose management involvement. Will continue with POC and notify MD with changes or concerns.    "

## 2019-01-03 NOTE — PROGRESS NOTES
CLINICAL NUTRITION SERVICES - REASSESSMENT NOTE     Nutrition Prescription    RECOMMENDATIONS FOR MDs/PROVIDERS TO ORDER:  Recommend a fixed dose short acting insulin with meals.  See typical/planned meal intake below.     Recommend close outpatient follow up with endocrine and/or diabetes educator for education review and monitoring of BG levels and insulin regimen.     Malnutrition Status:    Non-severe malnutrition in the context of acute on chronic illness    Recommendations already ordered by Registered Dietitian (RD):  Boost Glucose Control with breakfast and dinner meals (14 grams protein, 23 grams CHO)    Changed diet to High Consistent CHO    Future/Additional Recommendations:  Encourage 2 Boost Glucose Control with meals daily to help meet high protein needs.      EVALUATION OF THE PROGRESS TOWARD GOALS   Diet: Regular + Boost Shakes BID between meals     Intake: PO intake has been variable throughout hospital stay (ranging from 25% to 100% of meals).  Patient reports PO intake greatly decreased for a few days when he had issues with distention/bloating.  Per chart review, generally ordering ~2 meals/day and receiving 2 Boost Shakes daily.  He feels the Boost Shakes are too sweet.       3 day average (12/27,12/29, 12/30) = 1853 kcal (87% needs), 81 grams protein (76% needs) daily     NEW FINDINGS   Patient was pre-diabetic pre-transplant, has received solumedrol 12/30, 12/31 and 1/1 -> causing hyperglycemia requiring insulin administration.  Current insulin regimen: 32 units Lantus with dinner, Med sliding scale insulin with meals and HS, Novolog 1 unit per 7 grams total CHO at meals/snacks    Having BM daily, but feels constipated and having reflux - getting scheduled Senokot, Miralax, PPI, Simethicone and PRN MOM to help    Typical intake - his wife cooks everything from scratch (makes their own tortillas, etc)    B - 1 cup oatmeal + 1 tsp sugar + 1 banana + 1 cup milk  (encouraged patient substitute  "eggs for banana and/or Boost Glucose Control* for milk to temporarily to increase protein content) - would estimate ~90 grams CHO    L - fried eggs, steak, 3 corn tortillas (8-10\"), salsa 1/4-1/2 cup, 12 oz beverage (homemade lemonade or homemade oatmeal drink - typically add a small amount of sugar, much less than in traditional juices) - would estimate ~90 grams CHO    D - 8 ritz crackers, coffee + 1 tsp sugar (encouraged patient consume Boost Glucose Control* at this time to temporarily increase protein content) - would estimate 35-45 grams CHO     * Note the institutional Boost Glucose Control carton contains 23 grams CHO vs the commercial Boost Glucose Control bottle contains 16 grams CHO    MALNUTRITION  % Intake: Decreased intake does not meet criteria  % Weight Loss: None noted  Subcutaneous Fat Loss: Mild losses in thoracic area  Muscle Loss: Mild-Moderate losses in thoracic area  Fluid Accumulation/Edema: Moderate (LE)  Malnutrition Diagnosis: Non-severe malnutrition in the context of acute on chronic illness    Previous Goals   Diet adv v nutrition support within 2-3 days.  Evaluation: Met    Previous Nutrition Diagnosis  Inadequate protein-energy intake  Evaluation: Improving    CURRENT NUTRITION DIAGNOSIS  Food and nutrition knowledge deficit related to post-transplant diet guidelines and consistent CHO/CHO counting as evidenced by provider/patient request for information.     INTERVENTIONS  Implementation  Detailed education provided regarding the post-transplant diet.  Discussed need for increased protein intake, reviewed protein sources.  Discussed appropriate oral supplements.  Reviewed heart healthy diet guidelines - specifically choosing lean meats and fish primarily.  Encouraged limiting cheese after the acute post-transplant phase.  Discussed limiting salt.  Reviewed food safety precautions.      Provided education on CHO content in food.  Briefly reviewed CHO counting concept (insulin to CHO " "ratio), CHO content of various foods with food label reading education, however patient was quite overwhelmed by this. Discussed fixed dose as an option and patient and his wife very much favored this idea.  Discussed consuming 3-4 meals daily that contain CHO and limiting snacking/drinking CHO throughout the day in between.  Encouraged limiting portions of CHO containing foods at meals.  Also discussed consuming meals at consistent times.  Spoke with diabetes IRVIN re: recommendation for fixed dose insulin with meals.     Changed oral supplement to Boost Glucose Control and changed to \"with meals\".  Encouraged consuming a high protein supplement to help meet high protein needs.  Will provide patient with coupons and discussed where these supplements could be purchased outside of the hospital.      Changed diet to High Consistent CHO.     Goals  Pt to name 3 foods that contain CHO.  Pt to name 3 aspects of post-transplant diet education.     Monitoring/Evaluation  Progress toward goals will be monitored and evaluated per protocol.    Tahira Pierce MS, RD, LD  Pager [phone]    "

## 2019-01-03 NOTE — CONSULTS
Diabetes Education    Received consult request to see this 63 year old Zambian-speaking patient for education on insulin administration and blood glucose monitoring.    Patient is s/p liver transplant on 12/22/18; he had pre-diabetes prior to transplant but now requiring insulin due to steroid-induced hyperglycemia.  Current insulin regimen is:  Insulin glargine (Lantus) 32 units daily  Insulin aspart 1 unit/6 grams carbohydrate  Medium correction scale    Met with patient and .  Discussed concepts of basal/bolus insulin  Demonstrated use of insulin pen; patient did a return demonstration and did well.  Began discussion of dosing rapid-acting insulin to cover carbohydrates, but patient needs to meet with dietitian for carb counting education.    Left handouts with patient:  How to Use an Insulin Pen (Zambian)  Low Blood Sugar (Zambian)    Ended session as  needed to leave.  Will continue education on dosing rapid-acting insulin, blood glucose monitoring and hypoglycemia in another session.    Spoke with RN, suggested having patient practice insulin administration with home pen needles.    Catia Luo MS, APRN, CNS, CDE, CDTC  [phone]

## 2019-01-03 NOTE — PLAN OF CARE
PT: Attempted to see pt for scheduled therapy time, pt in room throughout session with other provider. Unable to check back d/t not having  the rest of day. Will reschedule for 01/04.

## 2019-01-03 NOTE — PROGRESS NOTES
IP Diabetes Management  Daily Note           Assessment and Plan:   HPI: Pt is a 64 yo man with a history of Laennec's cirrhosis with HCC, portal hypertension, latent TB, now s/p disease donor liver transplant on 12/22/2018.  He has had post-transplant hyperglycemia related to prednisone and IV solumedrol administration requiring transition to IV insulin 12/30. Undergoing liver biopsy to assess for rejection, and has received IV Solumedrol dosing  12/30, 12/31, and 1/1.    Assessment:   Pre-Diabetes (A1c 5.8%), with post transplant hyperglycemia related to steroids and immunosuppression meds. Pt received IV methylprednisolone 12/30-1/1 with near doubling in his IV insulin requirements temporarily.     Plan:    -Lantus reduced from 38 to 23 units (weight based) daily at 1700 due to hypoglycemia    -reduced from 1:6 to 1:10 CHO coverage with meals and snacks/supplements   -reduce aspart from high to moderate intensity correction scale    -BG monitoring TID AC, HS, and 0200   -consistent with carb counting protocol   -nutritionist consulted for carb counting/consistent carb education    Addendum: dietician reporting that despite extensive teaching, patient will not be able to be proficient in carb counting, thus will need fixed dose carb coverage on discharge. See dietician note from today regarding average CHO intake at home.   -CDE consulted for insulin teaching, and patient will trial self-injection technique with nursing.    -inpatient diabetes team will make recommendations on insulin for discharge- please page when a dispo date is known.   -care coordinator consult for appt set up with [facility] endocrinology in 1-2 weeks.     Plan discussed with patient and CDE today.     Interval History and Assessment: interval glucose trend reviewed: insulin infusion was discontinued at 1900 last night.BG  99>158 with 10 units aspart given for 65 g carbs at 1841.   Borderline hypoglycemia at 0400 (68), recheck down to 45 at  0800 with no further insulin on board after infusion discontinued. Last steroid dose now nearly 48 hours ago, will expect his insulin requirements will continue to downtrend.     Patient sleeping initially on interview; he awoke without difficulty to voice. He endorses feeling well today. He was not symptomatic with BG this morning. He is tolerating PO intake. Met with CDE this morning and feels somewhat comfortable with it. He will be meeting with dietician this afternoon. He hopes to discharge home with equipment to help him manage alone (he will not have daytime assistance).     Current nutritional intake and type: Orders Placed This Encounter      Regular Diet Adult    Steroid planning: received IV solumedrol 1000mg 12/30, 12/31, 1/1. Also on Tacrolimus and Mycophenolate.     PTA Diabetes Regimen: n/a, prediabetic    Discharge Planning: pending PT eval and clinical stability, 1-2 days.            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           Medications:     Current Facility-Administered Medications   Medication     alfuzosin ER (UROXATRAL) 24 hr tablet 10 mg     amLODIPine (NORVASC) tablet 10 mg     aspirin (ASA) EC tablet 325 mg     bisacodyl (DULCOLAX) Suppository 10 mg     bisacodyl (DULCOLAX) Suppository 10 mg     calcium carbonate (TUMS) chewable tablet 500 mg     clotrimazole (MYCELEX) lozenge 1 Giancarlo     dextrose 10 % 1,000 mL infusion     glucose gel 15-30 g    Or     dextrose 50 % injection 25-50 mL    Or     glucagon injection 1 mg     insulin aspart (NovoLOG) inj (RAPID ACTING)     insulin aspart (NovoLOG) inj (RAPID ACTING)     insulin aspart (NovoLOG) inj (RAPID ACTING)     insulin aspart (NovoLOG) inj (RAPID ACTING)     insulin glargine (LANTUS PEN) injection 30  "Units     ipratropium - albuterol 0.5 mg/2.5 mg/3 mL (DUONEB) neb solution 3 mL     magic mouthwash suspension (diphenhydramine, lidocaine, aluminum-magnesium & simethicone)     magnesium hydroxide (MILK OF MAGNESIA) suspension 30 mL     metoprolol tartrate (LOPRESSOR) tablet 75 mg     multivitamin w/minerals (THERA-VIT-M) tablet 1 tablet     mycophenolate (GENERIC EQUIVALENT) capsule 1,000 mg     naloxone (NARCAN) injection 0.1-0.4 mg     ondansetron (ZOFRAN) injection 4 mg     oxyCODONE (ROXICODONE) tablet 5 mg     pantoprazole (PROTONIX) EC tablet 40 mg     polyethylene glycol (MIRALAX/GLYCOLAX) Packet 17 g     senna-docusate (SENOKOT-S/PERICOLACE) 8.6-50 MG per tablet 2 tablet     simethicone (MYLICON) chewable tablet 80 mg     sodium chloride (PF) 0.9% PF flush 3 mL     sodium chloride (PF) 0.9% PF flush 3 mL     sodium chloride (PF) 0.9% PF flush 3 mL     sulfamethoxazole-trimethoprim (BACTRIM/SEPTRA) 400-80 MG per tablet 1 tablet     tacrolimus (GENERIC EQUIVALENT) capsule 5 mg     valGANciclovir (VALCYTE) tablet 450 mg            Physical Exam:    /65 (BP Location: Left arm)   Pulse 55   Temp 98  F (36.7  C) (Oral)   Resp 16   Ht 1.702 m (5' 7\")   Wt 88.6 kg (195 lb 4.8 oz)   SpO2 96%   BMI 30.59 kg/m    General: pleasant, in no distress, sitting up in chair.   HEENT: normocephalic, atraumatic. Oral mucous membranes moist.   Lungs: unlabored respiration, no cough  ABD: rounded, soft, no lipodystrophy noted  Skin: warm and dry, no obvious lesions  MSK:  moves all extremities  Lymp:  no LE edema   Mental status:  alert, oriented to self, place, time  Psych:  affect, calm and appropriate interaction             Data:     Recent Labs   Lab 01/03/19  0854 01/03/19  0824 01/03/19  0807 01/03/19  0542 01/03/19  0139 01/02/19  2100 01/02/19  1905  01/02/19  0605  01/01/19  0612  12/31/18  0536  12/30/18  0549  12/29/18  0636   GLC  --   --   --  68*  --   --   --   --  139*  --  144*  --  150*  --  181* "  --  169*   * 78 45*  --  119* 159* 158*   < >  --    < >  --    < >  --    < >  --    < >  --     < > = values in this interval not displayed.     Lab Results   Component Value Date    WBC 6.8 01/03/2019    WBC 7.7 01/02/2019    WBC 13.1 (H) 01/01/2019    HGB 8.1 (L) 01/03/2019    HGB 8.5 (L) 01/02/2019    HGB 10.0 (L) 01/01/2019    HCT 25.3 (L) 01/03/2019    HCT 26.3 (L) 01/02/2019    HCT 30.8 (L) 01/01/2019    MCV 98 01/03/2019    MCV 97 01/02/2019    MCV 97 01/01/2019     01/03/2019     01/02/2019     01/01/2019     Lab Results   Component Value Date     01/03/2019     01/02/2019     01/01/2019    POTASSIUM 4.6 01/03/2019    POTASSIUM 4.7 01/02/2019    POTASSIUM 4.4 01/01/2019    CHLORIDE 109 01/03/2019    CHLORIDE 108 01/02/2019    CHLORIDE 106 01/01/2019    CO2 25 01/03/2019    CO2 26 01/02/2019    CO2 26 01/01/2019    GLC 68 (L) 01/03/2019     (H) 01/02/2019     (H) 01/01/2019     Lab Results   Component Value Date    BUN 28 01/03/2019    BUN 29 01/02/2019    BUN 30 01/01/2019     Lab Results   Component Value Date    TSH 6.36 (H) 07/19/2018     Lab Results   Component Value Date    AST 25 01/03/2019    AST 43 01/02/2019    AST 51 (H) 01/01/2019     (H) 01/03/2019     (H) 01/02/2019     (H) 01/01/2019    ALKPHOS 608 (H) 01/03/2019    ALKPHOS 736 (H) 01/02/2019    ALKPHOS 926 (H) 01/01/2019         Diabetes Management Team job code: [id number]  I spent a total of 25 minutes bedside and on the inpatient unit managing glycemic care. Over 50% of my time on the unit was spent counseling the patient and/or coordinating care regarding acute hyperglycemia management.  See note for details.    Roma Lira PA-C  Inpatient Diabetes Management Service  Pager [phone]

## 2019-01-03 NOTE — PLAN OF CARE
OT/7A - Discharge Planner OT   Patient plan for discharge: home, possibly tomorrow  Current status: Facilitated functional mobility to/from rehab gym with SBA. Educated on sock aid and reacher for increased IND with LB dressing. Pt expresses interest in obtaining equipment prior to discharge.   Barriers to return to prior living situation: swelling, post surgical precautions  Recommendations for discharge: Home with assist, home RN, and home therapy  Rationale for recommendations: pt would benefit from home RN to assist with medication management and home therapy to ensure safety with set up, to assist with adaptive equipment needs, and to increase IND with ADL/IADL       Entered by: Dionne Su 01/03/2019 4:06 PM

## 2019-01-03 NOTE — PROGRESS NOTES
Transplant Social Work Services Progress Note      Date of Initial Social Work Evaluation: 12/24/2018  Collaborated with: Pt via .    Data: SW spoke with pt to do a supportive check in and discuss financial concerns and rides to clinic. Pt has [employer] [place] so should have ride benefits through insurance, informed him of this and that a few rides will be set up for him to clinic appointments. Will provide number to him to schedule rides for future appointments. He states his wife will come with him to all of his appointments and will be his primary caregiver.  Also discussed financial concerns. Pt is on [place] EPD and is able to work, however, will be out of work for the next few months after transplant. He is usually able to get by financially but has concerns about paying his [employer] premium when he is out of work. Advised that SW could use some fund to help pay this but the funds are limited and it is a one time use. Pt will get SW information to pay the bill tomorrow. SW emphasized the importance of not letting his MA lapse post transplant.   Intervention: Spoke with pt via  regarding finances and rides.  Assessment: Pt is concerned about ride benefits and also paying his insurance premium. Pt seemed to understand the importance of not letting his insurance lapse.  Education provided by SW: Insurance, ride benefits.  Plan:    Discharge Plans in Progress: [facility] following for home care.    Barriers to d/c plan: Medical stability.    Follow up Plan: SW will continue to follow.    ABRAHAM Soriano, LGSW  7A   Ph: [phone], Pager: [phone]

## 2019-01-03 NOTE — PROGRESS NOTES
Transplant Surgery  Inpatient Daily Progress Note  01/03/2019    Assessment & Plan: 62 yo M hx Laennec's cirrhosis with HCC. S/p DBD OLT with right inguinal hernia repair with biologic mesh 12/22/18. Notable for small arterial anastomosis (donor common hepatic with atherosclerosis to recipient replaced right hepatic).    Graft function: POD #12. LFTs trending down. Received empiric treatment for rejection with steroids 12/30-1/1. 12/31 biopsy: no rejection, mild portal and pericellular fibrosis associated with hepatocellular atrophy, minimal portal inflammation and widespread bile ductular proliferation.  Biliary anastomotic stricture: 12/26 US: small fluid collection, otherwise normal. 12/27 MRCP conceringn for biliary anastomotic narrowing. 12/28 ERCP: benign appearing ampullary and anastomotic stenosis managed by biliary sphincterotomy and biliary stent placement. Next ERCP end of February.  Immunosuppression management: Induction with steroid pulse, completed.  Methylpred: 1000mg 12/30, 1000mg 12/31, 1000mg 1/1  MMF: 1000mg BID  Tacro: Goal level 10-15.   Hematology:   Anemia: Hgb 8.1, stable  Vascular prophylaxis: Due to small HA anastomosis. Received dextran X48 hrs post-op, now on aspirin 325.   Cardiorespiratory:   Post operative atrial fibrillation with RVR: On 12/25. Started on scheduled metoprolol and amio bolus/drip. Trops low/negative. 12/25 Echo: severely dilated left atrium, mild to moderate LVH, preserved EF. Converted back to NSR 12/25 PM. Cardiology consulted. Recommend continue rate control with metoprolol, no indication for anticoagulation.   NSVT:  Two short runs documented on tele. Cards recommended no further cardiac testing or management.   Hypoxia/dyspnea/reactive airways: Post-op, now resolved. Continue PRN nebs.  HTN: Amlodipine 10mg and metoprolol 75mg oral BID. Pressures controlled. Consider replacing amlodipine if edema does not respond to diuretics.  GI/Nutrition: eating  well.  Constipation: Continue senna BID, miralax to BID. MOM prn. Added simethicone and PPI for bloating and reflux.  Endocrine:   Hyperglycemia: A1c 5.8% on admission. Pre-diabetes with steroid induced hyperglycemia. Insulin gtt started due to solumedrol, converted to lantus 1/2. Continue CC with meals. Endocrine consulted. DM education for insulin administration and glucose monitoring.  Fluid/Electrolytes:   Hypervolemia: BLE edema and scrotal edema. Lymphedema consult. Low albumin.  Lasix 40mg x1 today.  :   Hematuria: Developed 12/26 with manipulation of cota. Resolved. Cota now removed.  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  Prophylaxis: DVT, fall, clotrimazole, bactrim, valcyte  Disposition: 7A. PT recommends home with assist but pt has no assist available during the day. Discharge in 1-2 days if able to manage ADLs independently and if glucose controlled.    Medical Decision Making: Medium    IRVIN/Fellow/Resident Provider: Inez Baker NP [id number]    Faculty: Elvia Field M.D.  __________________________________________________________________  Transplant History:   12/22/2018 (Liver), Postoperative day: 12     Interval History: C/o edema, but feels otherwise well. Updated daughter Kaylene.    ROS:   A 10-point review of systems was negative except as noted above.    Curent Meds:    alfuzosin ER  10 mg Oral Daily with breakfast     amLODIPine  10 mg Oral Daily     aspirin  325 mg Oral Daily     bisacodyl  10 mg Rectal Once     clotrimazole  1 Giancarlo Buccal 4x Daily     insulin aspart  1-7 Units Subcutaneous TID AC     insulin aspart  1-5 Units Subcutaneous At Bedtime     insulin aspart   Subcutaneous TID AC     insulin glargine  30 Units Subcutaneous Daily with supper     metoprolol tartrate  75 mg Oral BID     multivitamin w/minerals  1 tablet Oral Daily     mycophenolate  1,000 mg Oral  "BID IS     pantoprazole  40 mg Oral QAM AC     polyethylene glycol  17 g Oral BID     senna-docusate  2 tablet Oral or Feeding Tube BID     simethicone  80 mg Oral 4x Daily     sodium chloride (PF)  3 mL Intravenous Q8H     sulfamethoxazole-trimethoprim  1 tablet Oral or Feeding Tube Daily     tacrolimus  5 mg Oral BID IS     valGANciclovir  450 mg Oral Daily       Physical Exam:     Admit Weight: 84.6 kg (186 lb 9.6 oz)    Current Vitals:   /63 (BP Location: Left arm)   Pulse 66   Temp 97.8  F (36.6  C) (Oral)   Resp 16   Ht 1.702 m (5' 7\")   Wt 88.6 kg (195 lb 4.8 oz)   SpO2 99%   BMI 30.59 kg/m      Vital sign ranges:    Temp:  [97.8  F (36.6  C)-98.5  F (36.9  C)] 97.8  F (36.6  C)  Pulse:  [60-66] 66  Heart Rate:  [58-71] 58  Resp:  [16-18] 16  BP: (122-143)/(58-73) 138/63  SpO2:  [97 %-100 %] 99 %  Patient Vitals for the past 24 hrs:   BP Temp Temp src Pulse Heart Rate Resp SpO2 Weight   01/03/19 0900 -- -- -- -- -- -- -- 88.6 kg (195 lb 4.8 oz)   01/03/19 0818 138/63 97.8  F (36.6  C) Oral 66 -- 16 99 % --   01/03/19 0359 122/73 98.3  F (36.8  C) Oral -- 58 16 98 % --   01/02/19 2348 126/69 98.1  F (36.7  C) Oral -- 68 16 97 % --   01/02/19 2004 -- -- -- -- 65 -- -- --   01/02/19 1906 133/63 98.5  F (36.9  C) Oral -- 66 18 100 % --   01/02/19 1529 129/58 98.4  F (36.9  C) Oral -- 66 18 98 % --   01/02/19 1358 143/66 -- -- -- 71 -- 100 % --   01/02/19 1233 139/64 97.8  F (36.6  C) Oral 60 -- 18 99 % --     General Appearance: in no apparent distress.   Skin: normal, warm, dry, No rashes, induration, or jaundice  Heart: RRR  Lungs: RA, unlabored  Abdomen: abd soft, slightly distended. Incision c/d/i, staples in place. KARINE X1- serous  Extremities: edema: present bilaterally. +1-2, wrapped. There is no skin breakdown.  Neurologic: awake, alert and oriented x4. Tremor absent.  PIV only      Data:   CMP  Recent Labs   Lab 01/03/19  0542 01/02/19  0605    140   POTASSIUM 4.6 4.7   CHLORIDE 109 108 "   CO2 25 26   GLC 68* 139*   BUN 28 29   CR 0.87 0.76   GFRESTIMATED >90 >90   GFRESTBLACK >90 >90   YELENA 7.4* 7.8*   MAG 2.2 2.2   PHOS 2.7 4.0   ALBUMIN 1.7* 1.9*   BILITOTAL 0.5 0.7   ALKPHOS 608* 736*   AST 25 43   * 221*     CBC  Recent Labs   Lab 01/03/19  0542 01/02/19  0605   HGB 8.1* 8.5*   WBC 6.8 7.7    259     COAGS  Recent Labs   Lab 12/31/18  0715   INR 1.14      Urinalysis  Recent Labs   Lab Test 12/25/18  0732 12/22/18  0034  07/19/18  1133   COLOR Yellow Yellow   < > Yellow   APPEARANCE Slightly Cloudy Clear   < > Clear   URINEGLC Negative Negative   < > Negative   URINEBILI Small* Negative   < > Negative   URINEKETONE Negative Negative   < > Negative   SG 1.021 1.012   < > 1.009   UBLD Moderate* Trace*   < > Negative   URINEPH 6.0 6.5   < > 8.0*   PROTEIN 30* Negative   < > Negative   NITRITE Negative Negative   < > Negative   LEUKEST Negative Negative   < > Negative   RBCU 7* 3*   < >  --    WBCU 1 1   < >  --    UTPG  --   --   --  Unable to calculate due to low value    < > = values in this interval not displayed.     Virology:  Hepatitis C Antibody   Date Value Ref Range Status   12/22/2018 Nonreactive NR^Nonreactive Final     Comment:     Assay performance characteristics have not been established for newborns,   infants, and children

## 2019-01-04 ENCOUNTER — APPOINTMENT (OUTPATIENT)
Dept: OCCUPATIONAL THERAPY | Facility: CLINIC | Age: 64
DRG: 005 | End: 2019-01-04
Attending: TRANSPLANT SURGERY
Payer: MEDICARE

## 2019-01-04 ENCOUNTER — APPOINTMENT (OUTPATIENT)
Dept: PHYSICAL THERAPY | Facility: CLINIC | Age: 64
DRG: 005 | End: 2019-01-04
Attending: TRANSPLANT SURGERY
Payer: MEDICARE

## 2019-01-04 ENCOUNTER — OFFICE VISIT (OUTPATIENT)
Dept: INTERPRETER SERVICES | Facility: CLINIC | Age: 64
End: 2019-01-04
Payer: MEDICARE

## 2019-01-04 LAB
ALBUMIN SERPL-MCNC: 1.8 G/DL (ref 3.4–5)
ALP SERPL-CCNC: 766 U/L (ref 40–150)
ALT SERPL W P-5'-P-CCNC: 193 U/L (ref 0–70)
ANION GAP SERPL CALCULATED.3IONS-SCNC: 5 MMOL/L (ref 3–14)
AST SERPL W P-5'-P-CCNC: 64 U/L (ref 0–45)
BILIRUB DIRECT SERPL-MCNC: 0.5 MG/DL (ref 0–0.2)
BILIRUB SERPL-MCNC: 0.6 MG/DL (ref 0.2–1.3)
BUN SERPL-MCNC: 21 MG/DL (ref 7–30)
CALCIUM SERPL-MCNC: 7.4 MG/DL (ref 8.5–10.1)
CHLORIDE SERPL-SCNC: 108 MMOL/L (ref 94–109)
CO2 SERPL-SCNC: 26 MMOL/L (ref 20–32)
CREAT SERPL-MCNC: 1 MG/DL (ref 0.66–1.25)
ERYTHROCYTE [DISTWIDTH] IN BLOOD BY AUTOMATED COUNT: 14.6 % (ref 10–15)
GFR SERPL CREATININE-BSD FRML MDRD: 78 ML/MIN/{1.73_M2}
GLUCOSE BLDC GLUCOMTR-MCNC: 108 MG/DL (ref 70–99)
GLUCOSE BLDC GLUCOMTR-MCNC: 111 MG/DL (ref 70–99)
GLUCOSE BLDC GLUCOMTR-MCNC: 135 MG/DL (ref 70–99)
GLUCOSE BLDC GLUCOMTR-MCNC: 57 MG/DL (ref 70–99)
GLUCOSE BLDC GLUCOMTR-MCNC: 92 MG/DL (ref 70–99)
GLUCOSE BLDC GLUCOMTR-MCNC: 92 MG/DL (ref 70–99)
GLUCOSE SERPL-MCNC: 62 MG/DL (ref 70–99)
HCT VFR BLD AUTO: 27 % (ref 40–53)
HGB BLD-MCNC: 8.8 G/DL (ref 13.3–17.7)
MAGNESIUM SERPL-MCNC: 1.8 MG/DL (ref 1.6–2.3)
MCH RBC QN AUTO: 31.8 PG (ref 26.5–33)
MCHC RBC AUTO-ENTMCNC: 32.6 G/DL (ref 31.5–36.5)
MCV RBC AUTO: 98 FL (ref 78–100)
PHOSPHATE SERPL-MCNC: 3.1 MG/DL (ref 2.5–4.5)
PLATELET # BLD AUTO: 261 10E9/L (ref 150–450)
POTASSIUM SERPL-SCNC: 4.9 MMOL/L (ref 3.4–5.3)
PROT SERPL-MCNC: 4.6 G/DL (ref 6.8–8.8)
RBC # BLD AUTO: 2.77 10E12/L (ref 4.4–5.9)
SODIUM SERPL-SCNC: 139 MMOL/L (ref 133–144)
TACROLIMUS BLD-MCNC: 12.2 UG/L (ref 5–15)
TME LAST DOSE: NORMAL H
WBC # BLD AUTO: 8.3 10E9/L (ref 4–11)

## 2019-01-04 PROCEDURE — A9270 NON-COVERED ITEM OR SERVICE: HCPCS | Mod: GY | Performed by: SURGERY

## 2019-01-04 PROCEDURE — 97116 GAIT TRAINING THERAPY: CPT | Mod: GP

## 2019-01-04 PROCEDURE — 25000132 ZZH RX MED GY IP 250 OP 250 PS 637: Mod: GY | Performed by: PHYSICIAN ASSISTANT

## 2019-01-04 PROCEDURE — A9270 NON-COVERED ITEM OR SERVICE: HCPCS | Mod: GY | Performed by: NURSE PRACTITIONER

## 2019-01-04 PROCEDURE — 12000026 ZZH R&B TRANSPLANT

## 2019-01-04 PROCEDURE — 80076 HEPATIC FUNCTION PANEL: CPT | Performed by: INTERNAL MEDICINE

## 2019-01-04 PROCEDURE — 25000132 ZZH RX MED GY IP 250 OP 250 PS 637: Mod: GY | Performed by: NURSE PRACTITIONER

## 2019-01-04 PROCEDURE — 85027 COMPLETE CBC AUTOMATED: CPT | Performed by: INTERNAL MEDICINE

## 2019-01-04 PROCEDURE — A9270 NON-COVERED ITEM OR SERVICE: HCPCS | Mod: GY | Performed by: PHYSICIAN ASSISTANT

## 2019-01-04 PROCEDURE — 83735 ASSAY OF MAGNESIUM: CPT | Performed by: INTERNAL MEDICINE

## 2019-01-04 PROCEDURE — 25000131 ZZH RX MED GY IP 250 OP 636 PS 637: Mod: GY | Performed by: SURGERY

## 2019-01-04 PROCEDURE — 97530 THERAPEUTIC ACTIVITIES: CPT | Mod: GO

## 2019-01-04 PROCEDURE — T1013 SIGN LANG/ORAL INTERPRETER: HCPCS | Mod: U3

## 2019-01-04 PROCEDURE — 97530 THERAPEUTIC ACTIVITIES: CPT | Mod: GP

## 2019-01-04 PROCEDURE — 84100 ASSAY OF PHOSPHORUS: CPT | Performed by: INTERNAL MEDICINE

## 2019-01-04 PROCEDURE — 97140 MANUAL THERAPY 1/> REGIONS: CPT | Mod: GO

## 2019-01-04 PROCEDURE — 36415 COLL VENOUS BLD VENIPUNCTURE: CPT | Performed by: INTERNAL MEDICINE

## 2019-01-04 PROCEDURE — 25000125 ZZHC RX 250: Performed by: NURSE PRACTITIONER

## 2019-01-04 PROCEDURE — 97535 SELF CARE MNGMENT TRAINING: CPT | Mod: GO

## 2019-01-04 PROCEDURE — 25000131 ZZH RX MED GY IP 250 OP 636 PS 637: Mod: GY | Performed by: NURSE PRACTITIONER

## 2019-01-04 PROCEDURE — 25000132 ZZH RX MED GY IP 250 OP 250 PS 637: Mod: GY | Performed by: SURGERY

## 2019-01-04 PROCEDURE — 40000133 ZZH STATISTIC OT WARD VISIT

## 2019-01-04 PROCEDURE — 25000132 ZZH RX MED GY IP 250 OP 250 PS 637: Mod: GY | Performed by: TRANSPLANT SURGERY

## 2019-01-04 PROCEDURE — 40000193 ZZH STATISTIC PT WARD VISIT

## 2019-01-04 PROCEDURE — 80197 ASSAY OF TACROLIMUS: CPT | Performed by: PHYSICIAN ASSISTANT

## 2019-01-04 PROCEDURE — 00000146 ZZHCL STATISTIC GLUCOSE BY METER IP

## 2019-01-04 PROCEDURE — 80048 BASIC METABOLIC PNL TOTAL CA: CPT | Performed by: INTERNAL MEDICINE

## 2019-01-04 RX ORDER — PREDNISONE 5 MG/1
5 TABLET ORAL DAILY
Status: DISCONTINUED | OUTPATIENT
Start: 2019-01-04 | End: 2019-01-07 | Stop reason: HOSPADM

## 2019-01-04 RX ORDER — URSODIOL 300 MG/1
300 CAPSULE ORAL 2 TIMES DAILY
Status: DISCONTINUED | OUTPATIENT
Start: 2019-01-04 | End: 2019-01-07 | Stop reason: HOSPADM

## 2019-01-04 RX ADMIN — CLOTRIMAZOLE 1 TROCHE: 10 LOZENGE ORAL at 19:46

## 2019-01-04 RX ADMIN — MYCOPHENOLATE MOFETIL 1000 MG: 250 CAPSULE ORAL at 18:38

## 2019-01-04 RX ADMIN — TACROLIMUS 6 MG: 5 CAPSULE ORAL at 18:38

## 2019-01-04 RX ADMIN — MULTIPLE VITAMINS W/ MINERALS TAB 1 TABLET: TAB at 08:33

## 2019-01-04 RX ADMIN — METOPROLOL TARTRATE 75 MG: 50 TABLET ORAL at 19:45

## 2019-01-04 RX ADMIN — POLYETHYLENE GLYCOL 3350 17 G: 17 POWDER, FOR SOLUTION ORAL at 08:34

## 2019-01-04 RX ADMIN — CLOTRIMAZOLE 1 TROCHE: 10 LOZENGE ORAL at 13:38

## 2019-01-04 RX ADMIN — SIMETHICONE CHEW TAB 80 MG 80 MG: 80 TABLET ORAL at 19:46

## 2019-01-04 RX ADMIN — PANTOPRAZOLE SODIUM 40 MG: 40 TABLET, DELAYED RELEASE ORAL at 08:34

## 2019-01-04 RX ADMIN — ASPIRIN 325 MG: 325 TABLET, DELAYED RELEASE ORAL at 08:33

## 2019-01-04 RX ADMIN — URSODIOL 300 MG: 300 CAPSULE ORAL at 19:46

## 2019-01-04 RX ADMIN — SULFAMETHOXAZOLE AND TRIMETHOPRIM 1 TABLET: 400; 80 TABLET ORAL at 08:33

## 2019-01-04 RX ADMIN — CLOTRIMAZOLE 1 TROCHE: 10 LOZENGE ORAL at 08:34

## 2019-01-04 RX ADMIN — OXYCODONE HYDROCHLORIDE 5 MG: 5 TABLET ORAL at 16:10

## 2019-01-04 RX ADMIN — PREDNISONE 5 MG: 5 TABLET ORAL at 13:38

## 2019-01-04 RX ADMIN — CLOTRIMAZOLE 1 TROCHE: 10 LOZENGE ORAL at 16:03

## 2019-01-04 RX ADMIN — SIMETHICONE CHEW TAB 80 MG 80 MG: 80 TABLET ORAL at 13:38

## 2019-01-04 RX ADMIN — SENNOSIDES AND DOCUSATE SODIUM 2 TABLET: 8.6; 5 TABLET ORAL at 19:46

## 2019-01-04 RX ADMIN — SENNOSIDES AND DOCUSATE SODIUM 2 TABLET: 8.6; 5 TABLET ORAL at 08:33

## 2019-01-04 RX ADMIN — TACROLIMUS 6 MG: 5 CAPSULE ORAL at 08:33

## 2019-01-04 RX ADMIN — SIMETHICONE CHEW TAB 80 MG 80 MG: 80 TABLET ORAL at 08:33

## 2019-01-04 RX ADMIN — VALGANCICLOVIR 450 MG: 450 TABLET, FILM COATED ORAL at 08:33

## 2019-01-04 RX ADMIN — AMLODIPINE BESYLATE 10 MG: 10 TABLET ORAL at 08:33

## 2019-01-04 RX ADMIN — ALFUZOSIN HYDROCHLORIDE 10 MG: 10 TABLET, EXTENDED RELEASE ORAL at 08:34

## 2019-01-04 RX ADMIN — MYCOPHENOLATE MOFETIL 1000 MG: 250 CAPSULE ORAL at 08:34

## 2019-01-04 RX ADMIN — URSODIOL 300 MG: 300 CAPSULE ORAL at 13:38

## 2019-01-04 RX ADMIN — METOPROLOL TARTRATE 75 MG: 50 TABLET ORAL at 08:34

## 2019-01-04 RX ADMIN — OXYCODONE HYDROCHLORIDE 5 MG: 5 TABLET ORAL at 10:05

## 2019-01-04 ASSESSMENT — ACTIVITIES OF DAILY LIVING (ADL)
ADLS_ACUITY_SCORE: 13
ADLS_ACUITY_SCORE: 11

## 2019-01-04 ASSESSMENT — MIFFLIN-ST. JEOR: SCORE: 1617.26

## 2019-01-04 NOTE — PLAN OF CARE
Discharge Planner PT  7A  Patient plan for discharge: Home, open to home therapies  Current status: Pt with good progression of independence with mobility at this date. Pt able to recall all abdominal precautions independently, performs flat bed mobility [place] with cues provided on technique, independent with sit<>stand. Pt ambulates 200ft + 800ft initially with SBA though progresses to independent, no unsteadiness or LOB with dynamic challenges. Pt ascends/descends 4 stairs x3 [place], going up and down sideways with rail in front of pt. VSS throughout session.  Barriers to return to prior living situation: Medical status, abdominal precautions  Recommendations for discharge: Home with assist for heavier ADLs, home RN, HHPT  Rationale for recommendations: Pt will require assist for ADLs that require lifting >10lbs in order to adhere to post-surgical precautions. Pt would benefit from home RN to assist with medication management, HHPT to progress strength and endurance within the home environment.        Entered by: Nubia Up 01/04/2019 2:10 PM

## 2019-01-04 NOTE — PLAN OF CARE
Edema 7A: Identified open area of skin on upper L thigh, RN informed as pt will likely require WOC consult.     Pt with moderate scrotal/penile edema, ordered new jock strap as current jock strap is soiled.     Pt with taut 2+ pitting in  BLEs from MTP to groin. GCB reapplied from MTP to knee crease with education on importance of muscle pump, elevation, and recommendation to follow up with HH lymphedema. Plan to wear GCB x 24-48 hours, however, please remove if compression causes numbness, tingling, pain, or becomes soiled.

## 2019-01-04 NOTE — PLAN OF CARE
OT/7B - Discharge Planner OT   Patient plan for discharge: home this weekend  Current status: Facilitated functional mobility to/from 6th floor rehab gym to obtain requested adaptive equipment (raised toilet seat) with SBA. Completed necessary paperwork and pt now has raised toilet seat in room and is labeled to reduce risk of it being misplaced prior to discharge.   Barriers to return to prior living situation: post surgical precautions  Recommendations for discharge: home with assist, home RN, and home therapy (including edema)  Rationale for recommendations: pt would benefit from home RN to assist with medication management and home therapy to ensure safety with set up, to assist with adaptive equipment needs, and to increase IND with ADL/IADL       Entered by: Dionne Su 01/04/2019 1:03 PM

## 2019-01-04 NOTE — PROGRESS NOTES
Care Coordinator  D/I: Per call from Kaylene, daughter, Ebony(niece) and Maggie(cousin) will attend the [facility] Friday, 1/4/19 @ 5pm bedside transplant teaching with .  P: I informed Kaylene that discharge is now being planned for Monday, 1/7/19 to continue teaching. Bedside RN, Vera is aware.  Per Audelia Jimenez, discharge pharmacy liason, we cannot print his medication labels in Libyan--has to be English. The med cards the pharmacy provides can be printed in Libyan. Pt's daughter can speak and read English.  Will follow.

## 2019-01-04 NOTE — PROGRESS NOTES
Transplant Surgery  Inpatient Daily Progress Note  01/04/2019    Assessment & Plan: 62 yo M hx Laennec's cirrhosis with HCC. S/p DBD OLT with right inguinal hernia repair with biologic mesh 12/22/18. Notable for small arterial anastomosis (donor common hepatic with atherosclerosis to recipient replaced right hepatic).    Graft function:S/p DBD OLTx, POD #13. Received empiric treatment for rejection with steroids 12/30-1/1. 12/31 biopsy: no rejection, mild portal and pericellular fibrosis associated with hepatocellular atrophy, minimal portal inflammation and widespread bile ductular proliferation. LFT's slightly elevated today.  Will add prednisone 5mg daily.  Biliary anastomotic stricture: 12/26 US: small fluid collection, otherwise normal. 12/27 MRCP conceringn for biliary anastomotic narrowing. 12/28 ERCP: benign appearing ampullary and anastomotic stenosis managed by biliary sphincterotomy and biliary stent placement. Next ERCP end of February. Start Dmitry 300mg BID.  Immunosuppression management: Induction with steroid pulse, completed.  Methylpred: 1000mg 12/30, 1000mg 12/31, 1000mg 1/1. Add prednisone 5mg daily  MMF: 1000mg BID  Tacro: FK 6mg BID. FK level 1/3=11.1 Goal level 10-15. FK level today pending.   Hematology:   Anemia of chronic disease: Hgb 8.8 stable  Vascular prophylaxis: Due to small HA anastomosis. Received dextran X48 hrs post-op, now on aspirin 325mg daily.   Cardiorespiratory:   Post operative atrial fibrillation with RVR: On 12/25. Started on scheduled metoprolol and amio bolus/drip. Trops low/negative. 12/25 Echo: severely dilated left atrium, mild to moderate LVH, preserved EF. Converted back to NSR 12/25 PM. Cardiology consulted. Recommend continue rate control with metoprolol, no indication for anticoagulation.   NSVT: Two short runs documented on tele. Cards recommended no further cardiac testing or management.   Hypoxia/dyspnea/reactive airways: Post-op, now resolved. Continue PRN  nebs.  HTN: Amlodipine 10mg and metoprolol 75mg oral BID. Pressures controlled. -130  GI/Nutrition: eating well.  Constipation: improved, Continue senna BID, miralax to BID. MOM prn. Added simethicone and PPI for bloating and reflux.  Endocrine:   Hyperglycemia: A1c 5.8% on admission. Pre-diabetes with steroid induced hyperglycemia requiring Insulin gtt.Now off gtt. converted to lantus 1/2. Continue CC with meals. Endocrine managing. DM education for insulin administration and glucose monitoring. Pt. Having difficulty with carb counting.  Will continue with sliding scale insulin. Hypoglycemia overnight, will decrease Lantus 17 units today and medium sliding scale insulin.   Fluid/Electrolytes:   Hypervolemia: BLE edema and scrotal edema. Lymphedema consulted-applied wraps. Low albumin.  Lasix 40mg 1/3 with good UOP.  :   Hematuria: Developed 12/26 with manipulation of cota. Resolved. Cota now removed.  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  Prophylaxis: DVT, fall, clotrimazole, bactrim, valcyte  Disposition: 7A. PT recommends home with assist but pt has no assist available during the day. Discharge Monday if able to manage ADLs independently and if glucose controlled.    Medical Decision Making: Medium    IRVIN/Fellow/Resident Provider: Racheal Sarabia, NP [id number]    Faculty: Elvia Field M.D.  __________________________________________________________________  Transplant History:   12/22/2018 (Liver), Postoperative day: 13     Interval History: C/o edema, but feels otherwise well. Good UOP with lasix.  BM x1 yesterday. Good appetite.     ROS:   A 10-point review of systems was negative except as noted above.    Curent Meds:    alfuzosin ER  10 mg Oral Daily with breakfast     amLODIPine  10 mg Oral Daily     aspirin  325 mg Oral Daily     bisacodyl  10 mg Rectal Once     clotrimazole  1  "Giancarlo Buccal 4x Daily     insulin aspart   Subcutaneous TID AC     insulin aspart  1-7 Units Subcutaneous TID AC     insulin aspart  1-5 Units Subcutaneous At Bedtime     insulin glargine  17 Units Subcutaneous Daily with supper     metoprolol tartrate  75 mg Oral BID     multivitamin w/minerals  1 tablet Oral Daily     mycophenolate  1,000 mg Oral BID IS     pantoprazole  40 mg Oral QAM AC     polyethylene glycol  17 g Oral BID     senna-docusate  2 tablet Oral or Feeding Tube BID     simethicone  80 mg Oral 4x Daily     sodium chloride (PF)  3 mL Intravenous Q8H     sulfamethoxazole-trimethoprim  1 tablet Oral or Feeding Tube Daily     tacrolimus  6 mg Oral BID IS     valGANciclovir  450 mg Oral Daily       Physical Exam:     Admit Weight: 84.6 kg (186 lb 9.6 oz)    Current Vitals:   /64 (BP Location: Left arm)   Pulse 63   Temp 98.2  F (36.8  C) (Oral)   Resp 15   Ht 1.702 m (5' 7\")   Wt 88.6 kg (195 lb 4.8 oz)   SpO2 98%   BMI 30.59 kg/m      Vital sign ranges:    Temp:  [97.9  F (36.6  C)-98.2  F (36.8  C)] 98.2  F (36.8  C)  Pulse:  [55-63] 63  Heart Rate:  [53-65] 60  Resp:  [15-16] 15  BP: (112-136)/(55-88) 112/64  SpO2:  [96 %-99 %] 98 %  Patient Vitals for the past 24 hrs:   BP Temp Temp src Pulse Heart Rate Resp SpO2   01/04/19 0729 112/64 98.2  F (36.8  C) Oral -- 60 15 98 %   01/04/19 0317 114/75 97.9  F (36.6  C) Oral -- 53 16 97 %   01/03/19 2252 136/88 98  F (36.7  C) Oral -- 65 16 98 %   01/03/19 1948 131/55 97.9  F (36.6  C) Oral -- 63 16 99 %   01/03/19 1633 119/62 98.1  F (36.7  C) Oral 63 -- 16 96 %   01/03/19 1146 128/65 98  F (36.7  C) Oral 55 -- 16 96 %     General Appearance: in no apparent distress.   Skin: normal, warm, dry, No rashes, induration, or jaundice  Heart: RRR  Lungs: RA, unlabored  Abdomen: abd soft, slightly distended, NTTP. Incision c/d/i with staples in place. KARINE X1- serous  Extremities: edema: present bilaterally. +1-2, wrapped. Left groin with opened " blister.  Neurologic: awake, alert and oriented x4. Tremor absent.  PIV only      Data:   CMP  Recent Labs   Lab 01/04/19  0534 01/03/19  0542    139   POTASSIUM 4.9 4.6   CHLORIDE 108 109   CO2 26 25   GLC 62* 68*   BUN 21 28   CR 1.00 0.87   GFRESTIMATED 78 >90   GFRESTBLACK >90 >90   YELENA 7.4* 7.4*   MAG 1.8 2.2   PHOS 3.1 2.7   ALBUMIN 1.8* 1.7*   BILITOTAL 0.6 0.5   ALKPHOS 766* 608*   AST 64* 25   * 167*     CBC  Recent Labs   Lab 01/04/19  0534 01/03/19  0542   HGB 8.8* 8.1*   WBC 8.3 6.8    248     COAGS  Recent Labs   Lab 12/31/18  0715   INR 1.14      Urinalysis  Recent Labs   Lab Test 12/25/18  0732 12/22/18  0034  07/19/18  1133   COLOR Yellow Yellow   < > Yellow   APPEARANCE Slightly Cloudy Clear   < > Clear   URINEGLC Negative Negative   < > Negative   URINEBILI Small* Negative   < > Negative   URINEKETONE Negative Negative   < > Negative   SG 1.021 1.012   < > 1.009   UBLD Moderate* Trace*   < > Negative   URINEPH 6.0 6.5   < > 8.0*   PROTEIN 30* Negative   < > Negative   NITRITE Negative Negative   < > Negative   LEUKEST Negative Negative   < > Negative   RBCU 7* 3*   < >  --    WBCU 1 1   < >  --    UTPG  --   --   --  Unable to calculate due to low value    < > = values in this interval not displayed.     Virology:  Hepatitis C Antibody   Date Value Ref Range Status   12/22/2018 Nonreactive NR^Nonreactive Final     Comment:     Assay performance characteristics have not been established for newborns,   infants, and children

## 2019-01-04 NOTE — PLAN OF CARE
"/64 (BP Location: Left arm)   Pulse 63   Temp 98.2  F (36.8  C) (Oral)   Resp 15   Ht 1.702 m (5' 7\")   Wt 87.4 kg (192 lb 9.6 oz)   SpO2 98%   BMI 30.17 kg/m      Patient VSS on RA, afebrile.  & 98, no sliding scale given. Incisional/scrotal edema managed with prn oxy x1. Tolerating CHO diet with good appetite, carb coverage given with meals. Bilat PIV-SL. Formed BM this morning. Adequate UOP. Incision, CDI. Posterior left thigh skin tear, scabbing. Anterior left thigh wound from popped blister, Vaseline gauze and mepilex dressing applied. Dep edema improving, lymphedema wraps on. R KARINE stripped per order w/125 ml serous/yellow output. Up ad shawn, worked with OT today. OT supplied home toilet extender, at bedside, to go home with patient when discharged. Diabetes education rescheduled to Monday d/t  unavailable. Plan for PLC transplant education at bedside ~1700. Will continue with POC and notify MD with changes or concerns.      "

## 2019-01-04 NOTE — PROGRESS NOTES
Diabetes Consult Daily  Progress Note          Assessment/Plan:   Mr. Loy Limon is a 62 yo man with a history of Laennec's cirrhosis with HCC, portal hypertension, latent TB, now s/p disease donor liver transplant on 12/22/2018.  Claribel had no diagnosis of diabetes prior to transplant but labs consistent with prediabetes (hemoglobin A1c 5.8% on admission).    Insulin needs continue to decrease as pt gets further out from last methylpred dose (dropped to 50s overnight).  Initiation of pred 5mg today may slightly increase daytime insulin needs.    Plan:  -glargine decreased from 23 to 15 units q24h- dosing time moved up to 1600.  -aspart for meals and snacks: decreased from 1unit/10g to 1unit/15g CHO. Could consider replacing aspart with linagliptin 5mg daily - will start only if transplant team determines this is ok, otherwise will continue insulin and plan for fixed dose at discharge  -aspart for correction:continue medium intensity ac and hs  -monitor glucose ac, hs and 0200           Outpatient diabetes follow up: please set up appointment with [facility] for 1-2 weeks.  Plan discussed with patient, primary team.           Interval History:     The last 24 hours progress and nursing notes reviewed.  Claribel reports that he is continuing to do better.  He is bothered by his edema in LE and scrotum but says this is getting better every day as he participates in therapy sessions.  His appetite appears to be improving and carb context of meals is about doubled today compared to yesterday.  Despite increased carb intake insulin needs continue to trend lower as he gets further out from last methylpred dose.  He is starting prednisone 5mg daily today (first dose given around 1330)- this may increase daytime insulin needs a little.  Ran test claim for linagliptin ( at DPP-4 inhibitor that does not require renal or hepatic dosing)- pt has 100% coverage ($4 copay).  I discussed  "with transplant team- they will consider but likely just stick with insulin for now.  Pt met with RD yesterday- not able to carb count so will need fixed dose for meals if remains on aspart. Aiming for discharge Monday.        PTA Regimen: None.  Labs consistent with prediabetes.      Recent Labs   Lab 01/04/19  1336 01/04/19  0736 01/04/19  0652 01/04/19  0632 01/04/19  0534 01/04/19  0214 01/03/19  2132  01/03/19  0542  01/02/19  0605  01/01/19  0612  12/31/18  0536  12/30/18  0549   GLC  --   --   --   --  62*  --   --   --  68*  --  139*  --  144*  --  150*  --  181*   BGM 92 111* 92 57*  --  108* 195*   < >  --    < >  --    < >  --    < >  --    < >  --     < > = values in this interval not displayed.               Review of Systems:   See interval hx          Medications:     Orders Placed This Encounter      High Consistent CHO Diet    Physical Exam:  Gen: Alert, sitting up in chair, in NAD. Pt's wife is present.   HEENT: NC/AT, mucous membranes are moist  Resp: Unlabored  Ext: bilateral LE edema  Neuro:oriented x3, communicating clearly  /64 (BP Location: Left arm)   Pulse 63   Temp 98.2  F (36.8  C) (Oral)   Resp 15   Ht 1.702 m (5' 7\")   Wt 87.4 kg (192 lb 9.6 oz)   SpO2 98%   BMI 30.17 kg/m             Data:     Lab Results   Component Value Date    A1C 5.8 12/22/2018              CBC RESULTS:   Recent Labs   Lab Test 01/04/19 0534   WBC 8.3   RBC 2.77*   HGB 8.8*   HCT 27.0*   MCV 98   MCH 31.8   MCHC 32.6   RDW 14.6        Recent Labs   Lab Test 01/04/19  0534 01/03/19  0542    139   POTASSIUM 4.9 4.6   CHLORIDE 108 109   CO2 26 25   ANIONGAP 5 5   GLC 62* 68*   BUN 21 28   CR 1.00 0.87   YELENA 7.4* 7.4*     Liver Function Studies -   Recent Labs   Lab Test 01/04/19  0534   PROTTOTAL 4.6*   ALBUMIN 1.8*   BILITOTAL 0.6   ALKPHOS 766*   AST 64*   *     Lab Results   Component Value Date    INR 1.14 12/31/2018    INR 1.36 12/23/2018    INR 1.61 12/23/2018    INR 2.04 " 12/22/2018    INR 1.77 12/22/2018    INR 1.61 11/07/2018    INR 2.10 10/12/2018    INR 1.95 09/18/2018    INR 1.52 08/15/2018    INR 1.56 07/19/2018    INR 1.41 07/02/2018    INR 1.58 06/18/2018         I spent a total of 35 minutes bedside and on the inpatient unit managing the glycemic care of Loy Limon. Over 50% of my time on the unit was spent counseling the patient and/or coordinating care regarding glucose management in the context of decreasing insulin needs, striving to simplify DM regimen in preparation for home, and prednisone starting.  See note for details.      Amie Koenig PA-C [phone]  Diabetes Management job code [id number]

## 2019-01-05 ENCOUNTER — APPOINTMENT (OUTPATIENT)
Dept: OCCUPATIONAL THERAPY | Facility: CLINIC | Age: 64
DRG: 005 | End: 2019-01-05
Attending: TRANSPLANT SURGERY
Payer: MEDICARE

## 2019-01-05 ENCOUNTER — OFFICE VISIT (OUTPATIENT)
Dept: INTERPRETER SERVICES | Facility: CLINIC | Age: 64
End: 2019-01-05
Payer: MEDICARE

## 2019-01-05 LAB
ALBUMIN SERPL-MCNC: 1.8 G/DL (ref 3.4–5)
ALP SERPL-CCNC: 826 U/L (ref 40–150)
ALT SERPL W P-5'-P-CCNC: 219 U/L (ref 0–70)
ANION GAP SERPL CALCULATED.3IONS-SCNC: 7 MMOL/L (ref 3–14)
AST SERPL W P-5'-P-CCNC: 50 U/L (ref 0–45)
BILIRUB DIRECT SERPL-MCNC: 0.3 MG/DL (ref 0–0.2)
BILIRUB SERPL-MCNC: 0.5 MG/DL (ref 0.2–1.3)
BUN SERPL-MCNC: 18 MG/DL (ref 7–30)
CALCIUM SERPL-MCNC: 7.2 MG/DL (ref 8.5–10.1)
CHLORIDE SERPL-SCNC: 107 MMOL/L (ref 94–109)
CO2 SERPL-SCNC: 24 MMOL/L (ref 20–32)
CREAT SERPL-MCNC: 0.92 MG/DL (ref 0.66–1.25)
ERYTHROCYTE [DISTWIDTH] IN BLOOD BY AUTOMATED COUNT: 14.7 % (ref 10–15)
GFR SERPL CREATININE-BSD FRML MDRD: 86 ML/MIN/{1.73_M2}
GLUCOSE BLDC GLUCOMTR-MCNC: 103 MG/DL (ref 70–99)
GLUCOSE BLDC GLUCOMTR-MCNC: 166 MG/DL (ref 70–99)
GLUCOSE BLDC GLUCOMTR-MCNC: 168 MG/DL (ref 70–99)
GLUCOSE BLDC GLUCOMTR-MCNC: 190 MG/DL (ref 70–99)
GLUCOSE BLDC GLUCOMTR-MCNC: 206 MG/DL (ref 70–99)
GLUCOSE BLDC GLUCOMTR-MCNC: 88 MG/DL (ref 70–99)
GLUCOSE SERPL-MCNC: 96 MG/DL (ref 70–99)
HCT VFR BLD AUTO: 26.1 % (ref 40–53)
HGB BLD-MCNC: 8.5 G/DL (ref 13.3–17.7)
MAGNESIUM SERPL-MCNC: 1.8 MG/DL (ref 1.6–2.3)
MCH RBC QN AUTO: 31.4 PG (ref 26.5–33)
MCHC RBC AUTO-ENTMCNC: 32.6 G/DL (ref 31.5–36.5)
MCV RBC AUTO: 96 FL (ref 78–100)
PHOSPHATE SERPL-MCNC: 2.9 MG/DL (ref 2.5–4.5)
PLATELET # BLD AUTO: 236 10E9/L (ref 150–450)
POTASSIUM SERPL-SCNC: 5 MMOL/L (ref 3.4–5.3)
PROT SERPL-MCNC: 4.6 G/DL (ref 6.8–8.8)
RBC # BLD AUTO: 2.71 10E12/L (ref 4.4–5.9)
SODIUM SERPL-SCNC: 138 MMOL/L (ref 133–144)
TACROLIMUS BLD-MCNC: 12.8 UG/L (ref 5–15)
TME LAST DOSE: NORMAL H
WBC # BLD AUTO: 6 10E9/L (ref 4–11)

## 2019-01-05 PROCEDURE — A9270 NON-COVERED ITEM OR SERVICE: HCPCS | Mod: GY | Performed by: PHYSICIAN ASSISTANT

## 2019-01-05 PROCEDURE — 25000132 ZZH RX MED GY IP 250 OP 250 PS 637: Mod: GY | Performed by: NURSE PRACTITIONER

## 2019-01-05 PROCEDURE — 25000132 ZZH RX MED GY IP 250 OP 250 PS 637: Mod: GY | Performed by: PHYSICIAN ASSISTANT

## 2019-01-05 PROCEDURE — T1013 SIGN LANG/ORAL INTERPRETER: HCPCS | Mod: U3

## 2019-01-05 PROCEDURE — A9270 NON-COVERED ITEM OR SERVICE: HCPCS | Mod: GY | Performed by: SURGERY

## 2019-01-05 PROCEDURE — 84100 ASSAY OF PHOSPHORUS: CPT | Performed by: INTERNAL MEDICINE

## 2019-01-05 PROCEDURE — 00000146 ZZHCL STATISTIC GLUCOSE BY METER IP

## 2019-01-05 PROCEDURE — 25000132 ZZH RX MED GY IP 250 OP 250 PS 637: Mod: GY | Performed by: SURGERY

## 2019-01-05 PROCEDURE — 80197 ASSAY OF TACROLIMUS: CPT | Performed by: PHYSICIAN ASSISTANT

## 2019-01-05 PROCEDURE — A9270 NON-COVERED ITEM OR SERVICE: HCPCS | Mod: GY | Performed by: NURSE PRACTITIONER

## 2019-01-05 PROCEDURE — 12000026 ZZH R&B TRANSPLANT

## 2019-01-05 PROCEDURE — 40000133 ZZH STATISTIC OT WARD VISIT

## 2019-01-05 PROCEDURE — 25000131 ZZH RX MED GY IP 250 OP 636 PS 637: Mod: GY | Performed by: NURSE PRACTITIONER

## 2019-01-05 PROCEDURE — 25000131 ZZH RX MED GY IP 250 OP 636 PS 637: Mod: GY | Performed by: SURGERY

## 2019-01-05 PROCEDURE — 97530 THERAPEUTIC ACTIVITIES: CPT | Mod: GO

## 2019-01-05 PROCEDURE — 85027 COMPLETE CBC AUTOMATED: CPT | Performed by: INTERNAL MEDICINE

## 2019-01-05 PROCEDURE — 80076 HEPATIC FUNCTION PANEL: CPT | Performed by: INTERNAL MEDICINE

## 2019-01-05 PROCEDURE — 80048 BASIC METABOLIC PNL TOTAL CA: CPT | Performed by: INTERNAL MEDICINE

## 2019-01-05 PROCEDURE — 25000125 ZZHC RX 250: Performed by: NURSE PRACTITIONER

## 2019-01-05 PROCEDURE — 83735 ASSAY OF MAGNESIUM: CPT | Performed by: INTERNAL MEDICINE

## 2019-01-05 PROCEDURE — 25000132 ZZH RX MED GY IP 250 OP 250 PS 637: Mod: GY | Performed by: TRANSPLANT SURGERY

## 2019-01-05 PROCEDURE — 36415 COLL VENOUS BLD VENIPUNCTURE: CPT | Performed by: INTERNAL MEDICINE

## 2019-01-05 RX ORDER — TRAMADOL HYDROCHLORIDE 50 MG/1
50 TABLET ORAL EVERY 6 HOURS PRN
Status: DISCONTINUED | OUTPATIENT
Start: 2019-01-05 | End: 2019-01-07 | Stop reason: HOSPADM

## 2019-01-05 RX ADMIN — AMLODIPINE BESYLATE 10 MG: 10 TABLET ORAL at 07:54

## 2019-01-05 RX ADMIN — CLOTRIMAZOLE 1 TROCHE: 10 LOZENGE ORAL at 13:07

## 2019-01-05 RX ADMIN — POLYETHYLENE GLYCOL 3350 17 G: 17 POWDER, FOR SOLUTION ORAL at 07:54

## 2019-01-05 RX ADMIN — ALFUZOSIN HYDROCHLORIDE 10 MG: 10 TABLET, EXTENDED RELEASE ORAL at 07:53

## 2019-01-05 RX ADMIN — VALGANCICLOVIR 450 MG: 450 TABLET, FILM COATED ORAL at 07:53

## 2019-01-05 RX ADMIN — CLOTRIMAZOLE 1 TROCHE: 10 LOZENGE ORAL at 07:53

## 2019-01-05 RX ADMIN — CLOTRIMAZOLE 1 TROCHE: 10 LOZENGE ORAL at 16:00

## 2019-01-05 RX ADMIN — ASPIRIN 325 MG: 325 TABLET, DELAYED RELEASE ORAL at 07:54

## 2019-01-05 RX ADMIN — TACROLIMUS 6 MG: 5 CAPSULE ORAL at 18:10

## 2019-01-05 RX ADMIN — SENNOSIDES AND DOCUSATE SODIUM 2 TABLET: 8.6; 5 TABLET ORAL at 07:54

## 2019-01-05 RX ADMIN — CLOTRIMAZOLE 1 TROCHE: 10 LOZENGE ORAL at 20:58

## 2019-01-05 RX ADMIN — MYCOPHENOLATE MOFETIL 1000 MG: 250 CAPSULE ORAL at 07:53

## 2019-01-05 RX ADMIN — URSODIOL 300 MG: 300 CAPSULE ORAL at 20:58

## 2019-01-05 RX ADMIN — SIMETHICONE CHEW TAB 80 MG 80 MG: 80 TABLET ORAL at 13:07

## 2019-01-05 RX ADMIN — OXYCODONE HYDROCHLORIDE 5 MG: 5 TABLET ORAL at 23:08

## 2019-01-05 RX ADMIN — MYCOPHENOLATE MOFETIL 1000 MG: 250 CAPSULE ORAL at 18:10

## 2019-01-05 RX ADMIN — PANTOPRAZOLE SODIUM 40 MG: 40 TABLET, DELAYED RELEASE ORAL at 07:54

## 2019-01-05 RX ADMIN — URSODIOL 300 MG: 300 CAPSULE ORAL at 07:54

## 2019-01-05 RX ADMIN — SIMETHICONE CHEW TAB 80 MG 80 MG: 80 TABLET ORAL at 20:58

## 2019-01-05 RX ADMIN — SIMETHICONE CHEW TAB 80 MG 80 MG: 80 TABLET ORAL at 07:54

## 2019-01-05 RX ADMIN — MULTIPLE VITAMINS W/ MINERALS TAB 1 TABLET: TAB at 07:54

## 2019-01-05 RX ADMIN — METOPROLOL TARTRATE 75 MG: 50 TABLET ORAL at 07:54

## 2019-01-05 RX ADMIN — TACROLIMUS 6 MG: 5 CAPSULE ORAL at 07:53

## 2019-01-05 RX ADMIN — METOPROLOL TARTRATE 75 MG: 50 TABLET ORAL at 20:58

## 2019-01-05 RX ADMIN — SIMETHICONE CHEW TAB 80 MG 80 MG: 80 TABLET ORAL at 16:00

## 2019-01-05 RX ADMIN — SULFAMETHOXAZOLE AND TRIMETHOPRIM 1 TABLET: 400; 80 TABLET ORAL at 07:54

## 2019-01-05 RX ADMIN — PREDNISONE 5 MG: 5 TABLET ORAL at 07:54

## 2019-01-05 ASSESSMENT — ACTIVITIES OF DAILY LIVING (ADL)
ADLS_ACUITY_SCORE: 12
ADLS_ACUITY_SCORE: 11
ADLS_ACUITY_SCORE: 12
ADLS_ACUITY_SCORE: 12
ADLS_ACUITY_SCORE: 11
ADLS_ACUITY_SCORE: 11

## 2019-01-05 ASSESSMENT — MIFFLIN-ST. JEOR
SCORE: 1602.74
SCORE: 1602.74

## 2019-01-05 NOTE — PLAN OF CARE
Discharge Planner OT   Patient plan for discharge: home with assist  Current status: SBA transfers and ambulation with no use of FWW and good dynamic balance. Pt has min pain in groin region. Pt ambulated ~500 ft with no increase in fatigue or dizziness.   Barriers to return to prior living situation: post-surgical precautions  Recommendations for discharge: home with assist from [facility]  Rationale for recommendations: Pt continues to progress in therapy and demo's increased functional endurance       Entered by: Dez Shields 01/05/2019 1:43 PM

## 2019-01-05 NOTE — PLAN OF CARE
"/59 (BP Location: Left arm)   Pulse 63   Temp 98  F (36.7  C) (Oral)   Resp 16   Ht 1.702 m (5' 7\")   Wt 85.9 kg (189 lb 6.4 oz)   SpO2 97%   BMI 29.66 kg/m      Patient VSS on RA, afebrile. BG 96 & 190, afternoon blood glucose managed with 2 units per sliding scale, discontinued carb coverage in afternoon. Denies pain. Tolerating regular diet with good appetite, 5 units carb coverage with breakfast. Bilat PIV-SL. BM this AM, miralax and senna given. Adequate UOP. Incision CDI, KARINE dressing changed, 225 serous/yellow output. Inner left thigh wound dressing CDI. Up ad shawn with OT, ambulated in marie many times today. Self-administered insulin with lunch. Plan for diabetes education tomorrow or Monday and potential discharge Monday. Will continue with POC and notify MD with changes or concerns.    "

## 2019-01-05 NOTE — PROGRESS NOTES
Transplant Surgery  Inpatient Daily Progress Note  01/05/2019    Assessment & Plan: 62 yo M hx Laennec's cirrhosis with HCC. S/p DBD OLT with right inguinal hernia repair with biologic mesh 12/22/18. Notable for small arterial anastomosis (donor common hepatic with atherosclerosis to recipient replaced right hepatic).    Graft function:S/p DBD OLTx, POD #14. Received empiric treatment for rejection with steroids 12/30-1/1. 12/31 biopsy: no rejection, mild portal and pericellular fibrosis associated with hepatocellular atrophy, minimal portal inflammation and widespread bile ductular proliferation. Slight elevation in LFTs- predominantly alk phos, but also in ALT. No global trend in either direction, but fluctuations. Added pred 5 daily until normalization- suspect some resolving inflammation.  Biliary anastomotic stricture: 12/26 US: small fluid collection, otherwise normal. 12/27 MRCP conceringn for biliary anastomotic narrowing. 12/28 ERCP: benign appearing ampullary and anastomotic stenosis managed by biliary sphincterotomy and biliary stent placement. Next ERCP end of February. Start Dmitry 300mg BID.  Immunosuppression management: Induction with steroid pulse, completed.  Methylpred: 1000mg 12/30, 1000mg 12/31, 1000mg 1/1. Pred 5 daily  MMF: 1000mg BID  Tacro: FK 6mg BID. FK level 1/3=11.1 Goal level 10-15. FK level today 12.8.   Hematology:   Anemia of chronic disease: Hgb 8.8 stable  Vascular prophylaxis: Due to small HA anastomosis. Received dextran X48 hrs post-op, now on aspirin 325mg daily.   Cardiorespiratory:   Post operative atrial fibrillation with RVR: On 12/25. Started on scheduled metoprolol and amio bolus/drip. Trops low/negative. 12/25 Echo: severely dilated left atrium, mild to moderate LVH, preserved EF. Converted back to NSR 12/25 PM. Cardiology consulted. Recommend continue rate control with metoprolol, no indication for anticoagulation.   NSVT: Two short runs documented on tele. Cards  recommended no further cardiac testing or management.   Hypoxia/dyspnea/reactive airways: Post-op, now resolved. Continue PRN nebs.  HTN: Amlodipine 10mg and metoprolol 75mg oral BID. Pressures controlled. -130  GI/Nutrition: eating well.  Constipation: improved, Continue senna BID, miralax to BID. MOM prn. Added simethicone and PPI for bloating and reflux.  Endocrine:   Hyperglycemia: A1c 5.8% on admission. Pre-diabetes with steroid induced hyperglycemia requiring Insulin gtt.Now off gtt. converted to lantus 1/2. Continue CC with meals. Endocrine managing. DM education for insulin administration and glucose monitoring. Pt. Having difficulty with carb counting.  Will continue with sliding scale insulin. Hypoglycemia overnight, two nights ago. Lantus at 17 daily with sliding scale for meals. No further hypoglycemic episodes.  Fluid/Electrolytes:   Hypervolemia: BLE edema and scrotal edema. Lymphedema consulted-applied wraps. Low albumin.  Lasix 40mg 1/3 with good UOP. Improving daily  :   Hematuria: Developed 12/26 with manipulation of cota. Resolved. Cota now removed.  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  Prophylaxis: DVT, fall, clotrimazole, bactrim, valcyte  Disposition: 7A. PT recommends home with assist but pt has no assist available during the day. Discharge Monday if able to manage ADLs independently and if glucose controlled.    Medical Decision Making: Medium    IRVIN/Fellow/Resident Provider: Sebastián Agudelo, Fellow    Faculty: Elvia Field M.D.  __________________________________________________________________  Transplant History:   12/22/2018 (Liver), Postoperative day: 14     Interval History: No complaints. Has been up ambulating well. Feels swelling continues to improve. Intermittent abd distention with eating, but improves with bowel function. Diarrhea resolved,  "stool more formed. No other complaints.    ROS:   A 10-point review of systems was negative except as noted above.    Curent Meds:    alfuzosin ER  10 mg Oral Daily with breakfast     amLODIPine  10 mg Oral Daily     aspirin  325 mg Oral Daily     bisacodyl  10 mg Rectal Once     clotrimazole  1 Giancarlo Buccal 4x Daily     insulin aspart   Subcutaneous TID AC     insulin aspart  1-7 Units Subcutaneous TID AC     insulin aspart  1-5 Units Subcutaneous At Bedtime     insulin glargine  10 Units Subcutaneous Daily with supper     metoprolol tartrate  75 mg Oral BID     multivitamin w/minerals  1 tablet Oral Daily     mycophenolate  1,000 mg Oral BID IS     pantoprazole  40 mg Oral QAM AC     polyethylene glycol  17 g Oral BID     predniSONE  5 mg Oral Daily     senna-docusate  2 tablet Oral or Feeding Tube BID     simethicone  80 mg Oral 4x Daily     sodium chloride (PF)  3 mL Intravenous Q8H     sulfamethoxazole-trimethoprim  1 tablet Oral or Feeding Tube Daily     tacrolimus  6 mg Oral BID IS     ursodiol  300 mg Oral BID     valGANciclovir  450 mg Oral Daily       Physical Exam:     Admit Weight: 84.6 kg (186 lb 9.6 oz)    Current Vitals:   /57 (BP Location: Left arm)   Pulse 63   Temp 98.4  F (36.9  C) (Oral)   Resp 18   Ht 1.702 m (5' 7\")   Wt 85.9 kg (189 lb 6.4 oz)   SpO2 98%   BMI 29.66 kg/m      Vital sign ranges:    Temp:  [98  F (36.7  C)-98.6  F (37  C)] 98.4  F (36.9  C)  Heart Rate:  [58-66] 64  Resp:  [18] 18  BP: (118-137)/(57-74) 128/57  SpO2:  [97 %-98 %] 98 %  Patient Vitals for the past 24 hrs:   BP Temp Temp src Heart Rate Resp SpO2 Weight   01/05/19 0744 -- -- -- -- -- -- 85.9 kg (189 lb 6.4 oz)   01/05/19 0743 -- -- -- -- -- -- 85.9 kg (189 lb 6.4 oz)   01/05/19 0731 128/57 98.4  F (36.9  C) Oral 64 18 98 % --   01/05/19 0348 137/74 98  F (36.7  C) Oral 60 18 98 % --   01/04/19 2349 134/69 98.6  F (37  C) Oral 58 18 98 % --   01/04/19 1939 118/59 98.2  F (36.8  C) Oral 66 18 97 % -- "   01/04/19 1134 -- -- -- -- -- -- 87.4 kg (192 lb 9.6 oz)     General Appearance: in no apparent distress.   Skin: normal, warm, dry, No rashes, induration, or jaundice  Heart: RRR  Lungs: RA, unlabored  Abdomen: abd soft, slightly distended, NTTP. Incision c/d/i with staples in place. KARINE X1- serous  Extremities: edema: present bilaterally. +1-2, wrapped. Left groin with opened blister.  Neurologic: awake, alert and oriented x4. Tremor absent.  PIV only      Data:   CMP  Recent Labs   Lab 01/05/19  0547 01/04/19  0534    139   POTASSIUM 5.0 4.9   CHLORIDE 107 108   CO2 24 26   GLC 96 62*   BUN 18 21   CR 0.92 1.00   GFRESTIMATED 86 78   GFRESTBLACK >90 >90   YELENA 7.2* 7.4*   MAG 1.8 1.8   PHOS 2.9 3.1   ALBUMIN 1.8* 1.8*   BILITOTAL 0.5 0.6   ALKPHOS 826* 766*   AST 50* 64*   * 193*     CBC  Recent Labs   Lab 01/05/19  0547 01/04/19  0534   HGB 8.5* 8.8*   WBC 6.0 8.3    261     COAGS  Recent Labs   Lab 12/31/18  0715   INR 1.14      Urinalysis  Recent Labs   Lab Test 12/25/18  0732 12/22/18  0034  07/19/18  1133   COLOR Yellow Yellow   < > Yellow   APPEARANCE Slightly Cloudy Clear   < > Clear   URINEGLC Negative Negative   < > Negative   URINEBILI Small* Negative   < > Negative   URINEKETONE Negative Negative   < > Negative   SG 1.021 1.012   < > 1.009   UBLD Moderate* Trace*   < > Negative   URINEPH 6.0 6.5   < > 8.0*   PROTEIN 30* Negative   < > Negative   NITRITE Negative Negative   < > Negative   LEUKEST Negative Negative   < > Negative   RBCU 7* 3*   < >  --    WBCU 1 1   < >  --    UTPG  --   --   --  Unable to calculate due to low value    < > = values in this interval not displayed.     Virology:  Hepatitis C Antibody   Date Value Ref Range Status   12/22/2018 Nonreactive NR^Nonreactive Final     Comment:     Assay performance characteristics have not been established for newborns,   infants, and children

## 2019-01-05 NOTE — PLAN OF CARE
"/74 (BP Location: Left arm)   Pulse 63   Temp 98  F (36.7  C) (Oral)   Resp 18   Ht 1.702 m (5' 7\")   Wt 87.4 kg (192 lb 9.6 oz)   SpO2 98%   BMI 30.17 kg/m      Patient VSS on RA, afebrile. BG checks ACHS. No complaints of pain, nausea or SOB. Tolerating high consistent carb diet with good appetite. L&R PIV saline locked. R KARINE with 200 yellow serous output. Voiding adequately, 1 BM this shift. Skin tear on L thigh with foam covering. Up ad shawn. Plans for discharge on Monday; needing diabetic education.  Will continue with POC and notify MD with changes or concerns.    "

## 2019-01-05 NOTE — PROGRESS NOTES
Claribel and his family were seen at the bedside for medication and transplant education. We were not able to start on time due to the  being quite late. I was able to cover his medications and lab work and we talked a little about concern for cancer and when to contact the transplant coordinator. He will need continued education follow-up once he discharges and goes to the clinic to reinforce the education done today.

## 2019-01-05 NOTE — PROGRESS NOTES
Brief diabetes note:    Reviewed glucoses. Eliminated meal coverage to see if it is not needed at time of discharge. Hoping he can discharge on basal only.     Eduardo Heller MD   Endocrinology Fellow  Pager: [phone]    I have reviewed the fellow's note, and agree with the plan of care.  ABRAM Bee

## 2019-01-06 ENCOUNTER — APPOINTMENT (OUTPATIENT)
Dept: OCCUPATIONAL THERAPY | Facility: CLINIC | Age: 64
DRG: 005 | End: 2019-01-06
Attending: TRANSPLANT SURGERY
Payer: MEDICARE

## 2019-01-06 ENCOUNTER — OFFICE VISIT (OUTPATIENT)
Dept: INTERPRETER SERVICES | Facility: CLINIC | Age: 64
End: 2019-01-06
Payer: MEDICARE

## 2019-01-06 ENCOUNTER — APPOINTMENT (OUTPATIENT)
Dept: PHYSICAL THERAPY | Facility: CLINIC | Age: 64
DRG: 005 | End: 2019-01-06
Attending: TRANSPLANT SURGERY
Payer: MEDICARE

## 2019-01-06 LAB
ALBUMIN SERPL-MCNC: 1.9 G/DL (ref 3.4–5)
ALP SERPL-CCNC: 701 U/L (ref 40–150)
ALT SERPL W P-5'-P-CCNC: 159 U/L (ref 0–70)
ANION GAP SERPL CALCULATED.3IONS-SCNC: 6 MMOL/L (ref 3–14)
AST SERPL W P-5'-P-CCNC: 26 U/L (ref 0–45)
BILIRUB DIRECT SERPL-MCNC: 0.3 MG/DL (ref 0–0.2)
BILIRUB SERPL-MCNC: 0.5 MG/DL (ref 0.2–1.3)
BUN SERPL-MCNC: 15 MG/DL (ref 7–30)
CALCIUM SERPL-MCNC: 7.7 MG/DL (ref 8.5–10.1)
CHLORIDE SERPL-SCNC: 107 MMOL/L (ref 94–109)
CO2 SERPL-SCNC: 24 MMOL/L (ref 20–32)
CREAT SERPL-MCNC: 0.85 MG/DL (ref 0.66–1.25)
ERYTHROCYTE [DISTWIDTH] IN BLOOD BY AUTOMATED COUNT: 14.7 % (ref 10–15)
GFR SERPL CREATININE-BSD FRML MDRD: >90 ML/MIN/{1.73_M2}
GLUCOSE BLDC GLUCOMTR-MCNC: 127 MG/DL (ref 70–99)
GLUCOSE BLDC GLUCOMTR-MCNC: 193 MG/DL (ref 70–99)
GLUCOSE BLDC GLUCOMTR-MCNC: 214 MG/DL (ref 70–99)
GLUCOSE BLDC GLUCOMTR-MCNC: 384 MG/DL (ref 70–99)
GLUCOSE BLDC GLUCOMTR-MCNC: 96 MG/DL (ref 70–99)
GLUCOSE SERPL-MCNC: 104 MG/DL (ref 70–99)
HCT VFR BLD AUTO: 26.6 % (ref 40–53)
HGB BLD-MCNC: 8.6 G/DL (ref 13.3–17.7)
MAGNESIUM SERPL-MCNC: 1.7 MG/DL (ref 1.6–2.3)
MCH RBC QN AUTO: 31.5 PG (ref 26.5–33)
MCHC RBC AUTO-ENTMCNC: 32.3 G/DL (ref 31.5–36.5)
MCV RBC AUTO: 97 FL (ref 78–100)
PHOSPHATE SERPL-MCNC: 2.6 MG/DL (ref 2.5–4.5)
PLATELET # BLD AUTO: 274 10E9/L (ref 150–450)
POTASSIUM SERPL-SCNC: 5.1 MMOL/L (ref 3.4–5.3)
PROT SERPL-MCNC: 4.9 G/DL (ref 6.8–8.8)
RBC # BLD AUTO: 2.73 10E12/L (ref 4.4–5.9)
SODIUM SERPL-SCNC: 137 MMOL/L (ref 133–144)
TACROLIMUS BLD-MCNC: 13 UG/L (ref 5–15)
TME LAST DOSE: NORMAL H
WBC # BLD AUTO: 6.1 10E9/L (ref 4–11)

## 2019-01-06 PROCEDURE — 25000131 ZZH RX MED GY IP 250 OP 636 PS 637: Mod: GY | Performed by: NURSE PRACTITIONER

## 2019-01-06 PROCEDURE — T1013 SIGN LANG/ORAL INTERPRETER: HCPCS | Mod: U3

## 2019-01-06 PROCEDURE — 83735 ASSAY OF MAGNESIUM: CPT | Performed by: INTERNAL MEDICINE

## 2019-01-06 PROCEDURE — 85027 COMPLETE CBC AUTOMATED: CPT | Performed by: INTERNAL MEDICINE

## 2019-01-06 PROCEDURE — A9270 NON-COVERED ITEM OR SERVICE: HCPCS | Mod: GY | Performed by: NURSE PRACTITIONER

## 2019-01-06 PROCEDURE — A9270 NON-COVERED ITEM OR SERVICE: HCPCS | Mod: GY | Performed by: SURGERY

## 2019-01-06 PROCEDURE — 80076 HEPATIC FUNCTION PANEL: CPT | Performed by: INTERNAL MEDICINE

## 2019-01-06 PROCEDURE — 25000132 ZZH RX MED GY IP 250 OP 250 PS 637: Mod: GY | Performed by: SURGERY

## 2019-01-06 PROCEDURE — 40000133 ZZH STATISTIC OT WARD VISIT

## 2019-01-06 PROCEDURE — 40000193 ZZH STATISTIC PT WARD VISIT

## 2019-01-06 PROCEDURE — 97140 MANUAL THERAPY 1/> REGIONS: CPT | Mod: GO

## 2019-01-06 PROCEDURE — 80197 ASSAY OF TACROLIMUS: CPT | Performed by: PHYSICIAN ASSISTANT

## 2019-01-06 PROCEDURE — 84100 ASSAY OF PHOSPHORUS: CPT | Performed by: INTERNAL MEDICINE

## 2019-01-06 PROCEDURE — 25000131 ZZH RX MED GY IP 250 OP 636 PS 637: Mod: GY | Performed by: SURGERY

## 2019-01-06 PROCEDURE — A9270 NON-COVERED ITEM OR SERVICE: HCPCS | Mod: GY | Performed by: PHYSICIAN ASSISTANT

## 2019-01-06 PROCEDURE — 25000132 ZZH RX MED GY IP 250 OP 250 PS 637: Mod: GY | Performed by: NURSE PRACTITIONER

## 2019-01-06 PROCEDURE — 12000026 ZZH R&B TRANSPLANT

## 2019-01-06 PROCEDURE — 00000146 ZZHCL STATISTIC GLUCOSE BY METER IP

## 2019-01-06 PROCEDURE — 25000132 ZZH RX MED GY IP 250 OP 250 PS 637: Mod: GY | Performed by: PHYSICIAN ASSISTANT

## 2019-01-06 PROCEDURE — 97110 THERAPEUTIC EXERCISES: CPT | Mod: GP

## 2019-01-06 PROCEDURE — 97535 SELF CARE MNGMENT TRAINING: CPT | Mod: GO

## 2019-01-06 PROCEDURE — 25000125 ZZHC RX 250: Performed by: NURSE PRACTITIONER

## 2019-01-06 PROCEDURE — 36415 COLL VENOUS BLD VENIPUNCTURE: CPT | Performed by: INTERNAL MEDICINE

## 2019-01-06 PROCEDURE — 80048 BASIC METABOLIC PNL TOTAL CA: CPT | Performed by: INTERNAL MEDICINE

## 2019-01-06 PROCEDURE — 25000132 ZZH RX MED GY IP 250 OP 250 PS 637: Mod: GY | Performed by: TRANSPLANT SURGERY

## 2019-01-06 RX ADMIN — METOPROLOL TARTRATE 75 MG: 50 TABLET ORAL at 19:55

## 2019-01-06 RX ADMIN — SENNOSIDES AND DOCUSATE SODIUM 2 TABLET: 8.6; 5 TABLET ORAL at 09:10

## 2019-01-06 RX ADMIN — METOPROLOL TARTRATE 75 MG: 50 TABLET ORAL at 09:10

## 2019-01-06 RX ADMIN — VALGANCICLOVIR 450 MG: 450 TABLET, FILM COATED ORAL at 09:10

## 2019-01-06 RX ADMIN — CLOTRIMAZOLE 1 TROCHE: 10 LOZENGE ORAL at 16:01

## 2019-01-06 RX ADMIN — MULTIPLE VITAMINS W/ MINERALS TAB 1 TABLET: TAB at 09:11

## 2019-01-06 RX ADMIN — TACROLIMUS 6 MG: 5 CAPSULE ORAL at 09:09

## 2019-01-06 RX ADMIN — SULFAMETHOXAZOLE AND TRIMETHOPRIM 1 TABLET: 400; 80 TABLET ORAL at 09:11

## 2019-01-06 RX ADMIN — SENNOSIDES AND DOCUSATE SODIUM 1 TABLET: 8.6; 5 TABLET ORAL at 19:54

## 2019-01-06 RX ADMIN — SIMETHICONE CHEW TAB 80 MG 80 MG: 80 TABLET ORAL at 16:01

## 2019-01-06 RX ADMIN — AMLODIPINE BESYLATE 10 MG: 10 TABLET ORAL at 09:10

## 2019-01-06 RX ADMIN — CLOTRIMAZOLE 1 TROCHE: 10 LOZENGE ORAL at 19:55

## 2019-01-06 RX ADMIN — URSODIOL 300 MG: 300 CAPSULE ORAL at 19:54

## 2019-01-06 RX ADMIN — SIMETHICONE CHEW TAB 80 MG 80 MG: 80 TABLET ORAL at 11:19

## 2019-01-06 RX ADMIN — POLYETHYLENE GLYCOL 3350 17 G: 17 POWDER, FOR SOLUTION ORAL at 09:08

## 2019-01-06 RX ADMIN — TACROLIMUS 6 MG: 5 CAPSULE ORAL at 17:24

## 2019-01-06 RX ADMIN — PREDNISONE 5 MG: 5 TABLET ORAL at 09:10

## 2019-01-06 RX ADMIN — CLOTRIMAZOLE 1 TROCHE: 10 LOZENGE ORAL at 09:10

## 2019-01-06 RX ADMIN — ASPIRIN 325 MG: 325 TABLET, DELAYED RELEASE ORAL at 09:09

## 2019-01-06 RX ADMIN — PANTOPRAZOLE SODIUM 40 MG: 40 TABLET, DELAYED RELEASE ORAL at 09:09

## 2019-01-06 RX ADMIN — SIMETHICONE CHEW TAB 80 MG 80 MG: 80 TABLET ORAL at 19:55

## 2019-01-06 RX ADMIN — SIMETHICONE CHEW TAB 80 MG 80 MG: 80 TABLET ORAL at 09:09

## 2019-01-06 RX ADMIN — MYCOPHENOLATE MOFETIL 1000 MG: 250 CAPSULE ORAL at 09:09

## 2019-01-06 RX ADMIN — CLOTRIMAZOLE 1 TROCHE: 10 LOZENGE ORAL at 11:19

## 2019-01-06 RX ADMIN — URSODIOL 300 MG: 300 CAPSULE ORAL at 09:11

## 2019-01-06 RX ADMIN — ALFUZOSIN HYDROCHLORIDE 10 MG: 10 TABLET, EXTENDED RELEASE ORAL at 09:13

## 2019-01-06 RX ADMIN — MYCOPHENOLATE MOFETIL 1000 MG: 250 CAPSULE ORAL at 17:24

## 2019-01-06 ASSESSMENT — ACTIVITIES OF DAILY LIVING (ADL)
ADLS_ACUITY_SCORE: 11

## 2019-01-06 ASSESSMENT — MIFFLIN-ST. JEOR: SCORE: 1585.05

## 2019-01-06 NOTE — PLAN OF CARE
Edema/7A: Pt presenting with significant pitting reductions in legs above ankle, continues to present with increased pitting distally, however pt pleased with progress. Pt with soft 2+ pitting in dorsum of feet and ankles, 1+/2+ extending to proximal knees with greatest fluid accumulation distally. Skin intact with no evidence of breakdown. Skin cares performed prior to reapplication of GCB with sween 24 from MTPs to knee creases for continued protection of skin integrity. Pt reporting not being able to tolerate wear of Jessi, educated on conservative management strategies, pt verbalized understanding. Okay to wear x 48 hours until therapist returns. Please remove compression if causing numbness, tingling, pain, or becomes soiled.    Recommend Home Health Lymphedema services at discharge for continued edema management and long-term garment fit. Pt in agreement.

## 2019-01-06 NOTE — PROGRESS NOTES
Transplant Surgery  Inpatient Daily Progress Note  01/06/2019    Assessment & Plan: 64 yo M hx Laennec's cirrhosis with HCC. S/p DBD OLT with right inguinal hernia repair with biologic mesh 12/22/18. Notable for small arterial anastomosis (donor common hepatic with atherosclerosis to recipient replaced right hepatic).    Graft function:S/p DBD OLTx, POD #15. Received empiric treatment for rejection with steroids 12/30-1/1. 12/31 biopsy: no rejection, mild portal and pericellular fibrosis associated with hepatocellular atrophy, minimal portal inflammation and widespread bile ductular proliferation. Slight elevation in LFTs- predominantly alk phos, but also in ALT. No global trend in either direction, but fluctuations. Added pred 5 daily until normalization- suspect some resolving inflammation.  Biliary anastomotic stricture: 12/26 US: small fluid collection, otherwise normal. 12/27 MRCP conceringn for biliary anastomotic narrowing. 12/28 ERCP: benign appearing ampullary and anastomotic stenosis managed by biliary sphincterotomy and biliary stent placement. Next ERCP end of February. Start Dmitry 300mg BID.  Immunosuppression management: Induction with steroid pulse, completed.  Methylpred: 1000mg 12/30, 1000mg 12/31, 1000mg 1/1. Pred 5 daily  MMF: 1000mg BID  Tacro: FK 6mg BID. Goal level 10-15. FK level today 13.1  Hematology:   Anemia of chronic disease: Hgb 8.8 stable  Vascular prophylaxis: Due to small HA anastomosis. Received dextran X48 hrs post-op, now on aspirin 325mg daily.   Cardiorespiratory:   Post operative atrial fibrillation with RVR: On 12/25. Started on scheduled metoprolol and amio bolus/drip. Trops low/negative. 12/25 Echo: severely dilated left atrium, mild to moderate LVH, preserved EF. Converted back to NSR 12/25 PM. Cardiology consulted. Recommend continue rate control with metoprolol, no indication for anticoagulation.   NSVT: Two short runs documented on tele. Cards recommended no further  cardiac testing or management.   Hypoxia/dyspnea/reactive airways: Post-op, now resolved. Continue PRN nebs.  HTN: Amlodipine 10mg and metoprolol 75mg oral BID. Pressures controlled. -130  GI/Nutrition: eating well.  Constipation: improved, Continue senna BID, miralax to BID. MOM prn. Added simethicone and PPI for bloating and reflux. Overall much improved  Endocrine:   Hyperglycemia: A1c 5.8% on admission. Pre-diabetes with steroid induced hyperglycemia requiring Insulin gtt.Now off gtt. converted to lantus 1/2. Continue CC with meals. Endocrine managing. DM education for insulin administration and glucose monitoring. Pt. Having difficulty with carb counting.  Will continue with sliding scale insulin. Hypoglycemia overnight, two nights ago. Lantus at 17 daily with sliding scale for meals. No further hypoglycemic episodes. Working well with DM educators- self-administers lantus and learning glucometer/sliding scale today  Fluid/Electrolytes:   Hypervolemia: BLE edema and scrotal edema. Lymphedema consulted-applied wraps. Low albumin.  Lasix 40mg 1/3 with good UOP. Improving daily  :   Hematuria: Developed 12/26 with manipulation of cota. Resolved. Cota now removed.  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  Prophylaxis: DVT, fall, clotrimazole, bactrim, valcyte  Disposition: 7A. PT recommends home with assist but pt has no assist available during the day. Discharge Monday if able to manage ADLs independently and if glucose controlled.    Medical Decision Making: Medium    IRVIN/Fellow/Resident Provider: Sebastián Agudelo, Fellow    Faculty: Elvia Field M.D.  __________________________________________________________________  Transplant History:   12/22/2018 (Liver), Postoperative day: 15     Interval History: No complaints. Continues to ambulate. Swelling improving. Seen working with DM  "educators- able to understand and self-administer lantus, learning to use glucometer and administer sliding scale corrections.     ROS:   A 10-point review of systems was negative except as noted above.    Curent Meds:    alfuzosin ER  10 mg Oral Daily with breakfast     amLODIPine  10 mg Oral Daily     aspirin  325 mg Oral Daily     bisacodyl  10 mg Rectal Once     clotrimazole  1 Giancarlo Buccal 4x Daily     insulin aspart  1-7 Units Subcutaneous TID AC     insulin aspart  1-5 Units Subcutaneous At Bedtime     insulin glargine  10 Units Subcutaneous Daily with supper     metoprolol tartrate  75 mg Oral BID     multivitamin w/minerals  1 tablet Oral Daily     mycophenolate  1,000 mg Oral BID IS     pantoprazole  40 mg Oral QAM AC     polyethylene glycol  17 g Oral BID     predniSONE  5 mg Oral Daily     senna-docusate  2 tablet Oral or Feeding Tube BID     simethicone  80 mg Oral 4x Daily     sodium chloride (PF)  3 mL Intravenous Q8H     sulfamethoxazole-trimethoprim  1 tablet Oral or Feeding Tube Daily     tacrolimus  6 mg Oral BID IS     ursodiol  300 mg Oral BID     valGANciclovir  450 mg Oral Daily       Physical Exam:     Admit Weight: 84.6 kg (186 lb 9.6 oz)    Current Vitals:   /69 (BP Location: Left arm)   Pulse 63   Temp 98.1  F (36.7  C) (Oral)   Resp 18   Ht 1.702 m (5' 7\")   Wt 84.1 kg (185 lb 8 oz)   SpO2 98%   BMI 29.05 kg/m      Vital sign ranges:    Temp:  [98  F (36.7  C)-98.2  F (36.8  C)] 98.1  F (36.7  C)  Heart Rate:  [60-89] 60  Resp:  [16-18] 18  BP: (115-139)/(59-69) 138/69  SpO2:  [95 %-98 %] 98 %  Patient Vitals for the past 24 hrs:   BP Temp Temp src Heart Rate Resp SpO2 Weight   01/06/19 0900 -- -- -- -- -- -- 84.1 kg (185 lb 8 oz)   01/06/19 0743 138/69 98.1  F (36.7  C) Oral 60 18 98 % --   01/06/19 0320 139/68 98.2  F (36.8  C) Oral 63 18 97 % --   01/05/19 2305 115/67 98.1  F (36.7  C) Oral 60 16 95 % --   01/05/19 1935 134/64 98.1  F (36.7  C) Oral 62 16 97 % -- "   01/05/19 1538 122/59 98  F (36.7  C) Oral 65 16 97 % --   01/05/19 1117 117/62 98  F (36.7  C) Oral 89 16 98 % --     General Appearance: in no apparent distress.   Skin: normal, warm, dry, No rashes, induration, or jaundice  Heart: RRR  Lungs: RA, unlabored  Abdomen: abd soft, slightly distended, NTTP. Incision c/d/i with staples in place. KARINE X1- serous  Extremities: edema: present bilaterally. +1-2, wrapped. Left groin with opened blister.  Neurologic: awake, alert and oriented x4. Tremor absent.  PIV only      Data:   CMP  Recent Labs   Lab 01/06/19  0612 01/05/19  0547    138   POTASSIUM 5.1 5.0   CHLORIDE 107 107   CO2 24 24   * 96   BUN 15 18   CR 0.85 0.92   GFRESTIMATED >90 86   GFRESTBLACK >90 >90   YELENA 7.7* 7.2*   MAG 1.7 1.8   PHOS 2.6 2.9   ALBUMIN 1.9* 1.8*   BILITOTAL 0.5 0.5   ALKPHOS 701* 826*   AST 26 50*   * 219*     CBC  Recent Labs   Lab 01/06/19  0612 01/05/19  0547   HGB 8.6* 8.5*   WBC 6.1 6.0    236     COAGS  Recent Labs   Lab 12/31/18  0715   INR 1.14      Urinalysis  Recent Labs   Lab Test 12/25/18  0732 12/22/18  0034  07/19/18  1133   COLOR Yellow Yellow   < > Yellow   APPEARANCE Slightly Cloudy Clear   < > Clear   URINEGLC Negative Negative   < > Negative   URINEBILI Small* Negative   < > Negative   URINEKETONE Negative Negative   < > Negative   SG 1.021 1.012   < > 1.009   UBLD Moderate* Trace*   < > Negative   URINEPH 6.0 6.5   < > 8.0*   PROTEIN 30* Negative   < > Negative   NITRITE Negative Negative   < > Negative   LEUKEST Negative Negative   < > Negative   RBCU 7* 3*   < >  --    WBCU 1 1   < >  --    UTPG  --   --   --  Unable to calculate due to low value    < > = values in this interval not displayed.     Virology:  Hepatitis C Antibody   Date Value Ref Range Status   12/22/2018 Nonreactive NR^Nonreactive Final     Comment:     Assay performance characteristics have not been established for newborns,   infants, and children

## 2019-01-06 NOTE — PLAN OF CARE
"/59 (BP Location: Left arm)   Pulse 63   Temp 98  F (36.7  C) (Oral)   Resp 16   Ht 1.702 m (5' 7\")   Wt 85.9 kg (189 lb 6.4 oz)   SpO2 97%   BMI 29.66 kg/m    5807-8896  Afebrile, all other VSS on RA.  before dinner, covered per sliding scale. Denied pain and nausea throughout shift. Good appetite on consistent carb diet. Bilateral PIVs saline locked.  Voiding adequate amounts, not saving. No BM this shift but had one earlier today. Abdominal incision well approximated with kaci. L KARINE dressing c/d/I. Changed dressing on L interior thigh blister. Up independently, ambulated in the marie with spouse. Plan for possible diabetes education tomorrow at 0900 in his room and then discharge home Monday. Please continue to monitor and update team with any changes.      "

## 2019-01-06 NOTE — PLAN OF CARE
VS: VSS on RA  B overnight  Pain/Nausea: c/o abdominal pain before bed. Relieved with PRN oxycodone. Denies nausea.  Diet: tolerating a high consistent CHO diet  LDA: PIV x2 saline locked  GI: one large BM overnight  : voiding, not always saving  Skin: incision well approximated with staples, IVETT. KARINE putting out small amounts of serous fluid.  Mobility: able to ambulate independently  Plan: diabetic education today at 0900

## 2019-01-06 NOTE — PLAN OF CARE
Discharge Planner PT   Patient plan for discharge: Home  Current status: Pt up ambulating independently, with distances greater than 500 ft. Completed stairs x16 with single rail, with minimal reports of fatigue.  Barriers to return to prior living situation: None per PT  Recommendations for discharge: Home with assist  Rationale for recommendations: pt independent in mobility, safe for return home       Entered by: Cheryle King 01/06/2019 4:46 PM

## 2019-01-06 NOTE — PROGRESS NOTES
Diabetes Consult Daily Progress Note    Assessment/Plan:      Post-liver transplant hyperglycemia - Type 2 Diabetes  Mr. Loy Limon is a 64 yo man with a history of Laennec's cirrhosis with HCC, portal hypertension, latent TB, now s/p disease donor liver transplant on 12/22/2018.  Claribel had no diagnosis of diabetes prior to transplant but labs consistent with prediabetes (hemoglobin A1c 5.8% on admission). Insulin needs continue to decrease as pt gets further out from last methylpred dose. Initiation of pred 5 mg 1/4/19.     Plan:  -glargine decreased from 15 units to 10 units daily at 1800 (with supper)  -discontinued meal coverage insulin to see if necessary at discharge   -continue medium corrective insulin  -Could consider replacing aspart with linagliptin 5mg daily - will start only if transplant team determines this is ok, otherwise will continue insulin and plan for fixed dose at discharge  -monitor glucose ac, hs and 0200    Addendum: glucose spike to 384 after breakfast, received 5 units corrective. Will hold on giving additional meal coverage insulin for now as we hope to keep his regimen as simple as possible at time of discharge. If labile/high today, may need fixed dose meal coverage ~5 units TID with meals and 10-15 units Lantus daily. Will need education prior to discharge if he will be going home on meal insulin +corrective as opposed to linagliptin.       Outpatient diabetes follow up: please set up appointment with [facility] for 1-2 weeks.    Plan discussed with patient.    Patient was discussed with Dr. Dea Heller MD   Endocrinology Fellow  Pager: [phone]     I have seen and examined the patient, reviewed and edited the fellow's note, and agree with the plan of care.  ABRAM Bee    Interval History:      AM fasting 104. Doing well today, sitting up in chair chatting with RN and family member. Planning to discharge tomorrow.       Orders Placed This  Encounter      High Consistent CHO Diet        Exam:      B/P: 138/69, T: 98.1, P: 63, R: 18    General: NAD.   HEENT: NC/AT, mucous membranes are moist.  Resp: Unlabored breathing.   Neuro: Alert and oriented, communicating clearly.   Ext: no swelling or edema  Data:      Recent Labs   Lab 01/06/19  0749 01/06/19  0612 01/06/19  0319 01/05/19  2141 01/05/19  1717 01/05/19  1309 01/05/19  0830 01/05/19  0547  01/04/19  0534  01/03/19  0542  01/02/19  0605  01/01/19  0612   GLC  --  104*  --   --   --   --   --  96  --  62*  --  68*  --  139*  --  144*   BGM 96  --  127* 168* 166* 190* 88  --    < >  --    < >  --    < >  --    < >  --     < > = values in this interval not displayed.     Lab Results   Component Value Date    A1C 5.8 12/22/2018

## 2019-01-06 NOTE — PLAN OF CARE
"/64 (BP Location: Left arm)   Pulse 63   Temp 98.1  F (36.7  C) (Oral)   Resp 16   Ht 1.702 m (5' 7\")   Wt 85.9 kg (189 lb 6.4 oz)   SpO2 97%   BMI 29.66 kg/m       6442-2967: A&O x 4; VSS on RA; , no insulin administered per sliding scale; denies pain, nausea, and SOB; HC CHO diet; Bilateral PIV saline locked; Voiding; Pt reports BM earlier today, declined bowel meds; R.KARINE with serous output, moderate; Up SBA; Will continue to monitor and update Liver service with any significant changes.   "

## 2019-01-06 NOTE — PLAN OF CARE
01/06/19 1152 Nayeli Rizzo, RN       1/6/19 I saw the patient in his room to continue diabetes education with an .Patient returned demonstration once of doing his own blood sugar using his new One Touch Verio Flexi BGM with some help and reminders.I briefly reviewed Lantus pen administration.Patient's nurse stated the patient has been doing well with the Lantus pen administration to himself.I also covered basic hypo/hyperglycemia.Per Endocrinology:they are hoping the patient can be discharged only on Lantus insulin and an oral agent.Team aware that I will not be covering Novolog insulin or correction scale today since the goal is for the patient to not have to do this at time of discharge.Patient to continue to practice BGM and insulin skills with bedside staff.Patient very attentive today,asking questions,able to answer basic teach back,and verbalized understanding of content presented.Patient has BGM,insulin administration and hypoglycemia written material in [place].Above information also given to Catia Luo(TESSY)

## 2019-01-06 NOTE — PLAN OF CARE
Patient VSS on RA, afebrile. BG 96 & 384, sliding scale insulin given, 5 units novolog, self-administered by patient. Practiced dialing up novolog pen, d/t patient unable to dial up correct number. Denies pain. Tolerating high CHO diet with good appetite. Bilat PIV-SL. BM this morning, loose/brown. Voiding, but not always saving. Incision CDI, KARINE dressing changed, 100 ml serous/yellow fluid out. Left anterior thigh wound cleaned and dressed with mepilex. Lymphedema wraps on, 3+ in legs & scrotum. Up ad shawn with PT and independently walking with wife. PLC RN performed diabetes education at bedside ~0900. Continue to encourage self-administration of insulin and help with dialing unit dosage, monitor pain and anticipate needs prior to D/C. Plan for discharge tomorrow.Will continue with POC and notify MD with changes or concerns.

## 2019-01-07 ENCOUNTER — OFFICE VISIT (OUTPATIENT)
Dept: INTERPRETER SERVICES | Facility: CLINIC | Age: 64
End: 2019-01-07
Payer: MEDICARE

## 2019-01-07 ENCOUNTER — APPOINTMENT (OUTPATIENT)
Dept: PHYSICAL THERAPY | Facility: CLINIC | Age: 64
DRG: 005 | End: 2019-01-07
Attending: TRANSPLANT SURGERY
Payer: MEDICARE

## 2019-01-07 ENCOUNTER — TELEPHONE (OUTPATIENT)
Dept: TRANSPLANT | Facility: CLINIC | Age: 64
End: 2019-01-07

## 2019-01-07 VITALS
TEMPERATURE: 98.1 F | BODY MASS INDEX: 29.11 KG/M2 | OXYGEN SATURATION: 99 % | HEIGHT: 67 IN | RESPIRATION RATE: 16 BRPM | WEIGHT: 185.5 LBS | DIASTOLIC BLOOD PRESSURE: 50 MMHG | HEART RATE: 63 BPM | SYSTOLIC BLOOD PRESSURE: 112 MMHG

## 2019-01-07 DIAGNOSIS — Z94.4 LIVER REPLACED BY TRANSPLANT (H): Primary | ICD-10-CM

## 2019-01-07 PROBLEM — E09.9 DRUG-INDUCED DIABETES MELLITUS (H): Status: ACTIVE | Noted: 2019-01-07

## 2019-01-07 LAB
ALBUMIN SERPL-MCNC: 1.9 G/DL (ref 3.4–5)
ALP SERPL-CCNC: 641 U/L (ref 40–150)
ALT SERPL W P-5'-P-CCNC: 115 U/L (ref 0–70)
ANION GAP SERPL CALCULATED.3IONS-SCNC: 6 MMOL/L (ref 3–14)
AST SERPL W P-5'-P-CCNC: 15 U/L (ref 0–45)
BILIRUB DIRECT SERPL-MCNC: 0.3 MG/DL (ref 0–0.2)
BILIRUB SERPL-MCNC: 0.6 MG/DL (ref 0.2–1.3)
BUN SERPL-MCNC: 14 MG/DL (ref 7–30)
CALCIUM SERPL-MCNC: 7.8 MG/DL (ref 8.5–10.1)
CHLORIDE SERPL-SCNC: 106 MMOL/L (ref 94–109)
CO2 SERPL-SCNC: 26 MMOL/L (ref 20–32)
CREAT SERPL-MCNC: 0.87 MG/DL (ref 0.66–1.25)
ERYTHROCYTE [DISTWIDTH] IN BLOOD BY AUTOMATED COUNT: 14.8 % (ref 10–15)
GFR SERPL CREATININE-BSD FRML MDRD: >90 ML/MIN/{1.73_M2}
GLUCOSE BLDC GLUCOMTR-MCNC: 124 MG/DL (ref 70–99)
GLUCOSE BLDC GLUCOMTR-MCNC: 125 MG/DL (ref 70–99)
GLUCOSE BLDC GLUCOMTR-MCNC: 235 MG/DL (ref 70–99)
GLUCOSE SERPL-MCNC: 103 MG/DL (ref 70–99)
HCT VFR BLD AUTO: 25 % (ref 40–53)
HGB BLD-MCNC: 8 G/DL (ref 13.3–17.7)
MAGNESIUM SERPL-MCNC: 1.8 MG/DL (ref 1.6–2.3)
MCH RBC QN AUTO: 31.1 PG (ref 26.5–33)
MCHC RBC AUTO-ENTMCNC: 32 G/DL (ref 31.5–36.5)
MCV RBC AUTO: 97 FL (ref 78–100)
PHOSPHATE SERPL-MCNC: 2.9 MG/DL (ref 2.5–4.5)
PLATELET # BLD AUTO: 267 10E9/L (ref 150–450)
POTASSIUM SERPL-SCNC: 4.8 MMOL/L (ref 3.4–5.3)
PROT SERPL-MCNC: 4.9 G/DL (ref 6.8–8.8)
RBC # BLD AUTO: 2.57 10E12/L (ref 4.4–5.9)
SODIUM SERPL-SCNC: 139 MMOL/L (ref 133–144)
TACROLIMUS BLD-MCNC: 9.2 UG/L (ref 5–15)
TME LAST DOSE: NORMAL H
WBC # BLD AUTO: 5.5 10E9/L (ref 4–11)

## 2019-01-07 PROCEDURE — A9270 NON-COVERED ITEM OR SERVICE: HCPCS | Mod: GY | Performed by: PHYSICIAN ASSISTANT

## 2019-01-07 PROCEDURE — 25000131 ZZH RX MED GY IP 250 OP 636 PS 637: Mod: GY | Performed by: NURSE PRACTITIONER

## 2019-01-07 PROCEDURE — 25000132 ZZH RX MED GY IP 250 OP 250 PS 637: Mod: GY | Performed by: TRANSPLANT SURGERY

## 2019-01-07 PROCEDURE — 83735 ASSAY OF MAGNESIUM: CPT | Performed by: INTERNAL MEDICINE

## 2019-01-07 PROCEDURE — 25000125 ZZHC RX 250: Performed by: NURSE PRACTITIONER

## 2019-01-07 PROCEDURE — 25000132 ZZH RX MED GY IP 250 OP 250 PS 637: Mod: GY | Performed by: SURGERY

## 2019-01-07 PROCEDURE — 25000132 ZZH RX MED GY IP 250 OP 250 PS 637: Mod: GY | Performed by: NURSE PRACTITIONER

## 2019-01-07 PROCEDURE — 80076 HEPATIC FUNCTION PANEL: CPT | Performed by: INTERNAL MEDICINE

## 2019-01-07 PROCEDURE — 25000131 ZZH RX MED GY IP 250 OP 636 PS 637: Mod: GY | Performed by: SURGERY

## 2019-01-07 PROCEDURE — 80197 ASSAY OF TACROLIMUS: CPT | Performed by: PHYSICIAN ASSISTANT

## 2019-01-07 PROCEDURE — 25000132 ZZH RX MED GY IP 250 OP 250 PS 637: Mod: GY | Performed by: PHYSICIAN ASSISTANT

## 2019-01-07 PROCEDURE — T1013 SIGN LANG/ORAL INTERPRETER: HCPCS | Mod: U3

## 2019-01-07 PROCEDURE — 00000146 ZZHCL STATISTIC GLUCOSE BY METER IP

## 2019-01-07 PROCEDURE — 97530 THERAPEUTIC ACTIVITIES: CPT | Mod: GP

## 2019-01-07 PROCEDURE — 80048 BASIC METABOLIC PNL TOTAL CA: CPT | Performed by: INTERNAL MEDICINE

## 2019-01-07 PROCEDURE — A9270 NON-COVERED ITEM OR SERVICE: HCPCS | Mod: GY | Performed by: NURSE PRACTITIONER

## 2019-01-07 PROCEDURE — 84100 ASSAY OF PHOSPHORUS: CPT | Performed by: INTERNAL MEDICINE

## 2019-01-07 PROCEDURE — A9270 NON-COVERED ITEM OR SERVICE: HCPCS | Mod: GY | Performed by: SURGERY

## 2019-01-07 PROCEDURE — 40000193 ZZH STATISTIC PT WARD VISIT

## 2019-01-07 PROCEDURE — 36415 COLL VENOUS BLD VENIPUNCTURE: CPT | Performed by: INTERNAL MEDICINE

## 2019-01-07 PROCEDURE — 85027 COMPLETE CBC AUTOMATED: CPT | Performed by: INTERNAL MEDICINE

## 2019-01-07 RX ORDER — TACROLIMUS 1 MG/1
7 CAPSULE ORAL 2 TIMES DAILY
Qty: 420 CAPSULE | Refills: 0 | Status: SHIPPED | OUTPATIENT
Start: 2019-01-07 | End: 2019-01-11

## 2019-01-07 RX ORDER — URSODIOL 300 MG/1
300 CAPSULE ORAL 2 TIMES DAILY
Qty: 60 CAPSULE | Refills: 11 | Status: SHIPPED | OUTPATIENT
Start: 2019-01-07 | End: 2019-01-07

## 2019-01-07 RX ORDER — CONTAINER,EMPTY
1 EACH MISCELLANEOUS DAILY
Qty: 1 EACH | Refills: 2 | Status: SHIPPED | OUTPATIENT
Start: 2019-01-07 | End: 2022-10-21

## 2019-01-07 RX ORDER — MYCOPHENOLATE MOFETIL 250 MG/1
1000 CAPSULE ORAL 2 TIMES DAILY
Qty: 240 CAPSULE | Refills: 11 | Status: SHIPPED | OUTPATIENT
Start: 2019-01-07 | End: 2019-01-14

## 2019-01-07 RX ORDER — AMLODIPINE BESYLATE 10 MG/1
10 TABLET ORAL DAILY
Qty: 30 TABLET | Refills: 11 | Status: SHIPPED | OUTPATIENT
Start: 2019-01-07 | End: 2020-01-21

## 2019-01-07 RX ORDER — URSODIOL 250 MG/1
250 TABLET, FILM COATED ORAL 2 TIMES DAILY
Qty: 60 TABLET | Refills: 11 | Status: ON HOLD | OUTPATIENT
Start: 2019-01-07 | End: 2019-04-29

## 2019-01-07 RX ORDER — AMOXICILLIN 250 MG
2 CAPSULE ORAL 2 TIMES DAILY
Qty: 120 TABLET | Refills: 0 | Status: SHIPPED | OUTPATIENT
Start: 2019-01-07 | End: 2019-01-24

## 2019-01-07 RX ORDER — METOPROLOL TARTRATE 75 MG/1
75 TABLET, FILM COATED ORAL 2 TIMES DAILY
Qty: 60 TABLET | Refills: 11 | Status: SHIPPED | OUTPATIENT
Start: 2019-01-07 | End: 2020-01-23

## 2019-01-07 RX ORDER — TRAMADOL HYDROCHLORIDE 50 MG/1
50 TABLET ORAL EVERY 6 HOURS PRN
Qty: 15 TABLET | Refills: 0 | Status: ON HOLD | OUTPATIENT
Start: 2019-01-07 | End: 2019-02-18

## 2019-01-07 RX ORDER — BUMETANIDE 1 MG/1
1 TABLET ORAL DAILY
Status: DISCONTINUED | OUTPATIENT
Start: 2019-01-07 | End: 2019-01-07 | Stop reason: HOSPADM

## 2019-01-07 RX ORDER — ASPIRIN 325 MG
325 TABLET, DELAYED RELEASE (ENTERIC COATED) ORAL DAILY
Qty: 30 TABLET | Refills: 5 | Status: SHIPPED | OUTPATIENT
Start: 2019-01-07 | End: 2019-04-19

## 2019-01-07 RX ORDER — POLYETHYLENE GLYCOL 3350 17 G/17G
1 POWDER, FOR SOLUTION ORAL 2 TIMES DAILY
Qty: 850 G | Refills: 3 | Status: SHIPPED | OUTPATIENT
Start: 2019-01-07 | End: 2019-02-25

## 2019-01-07 RX ORDER — SULFAMETHOXAZOLE AND TRIMETHOPRIM 400; 80 MG/1; MG/1
1 TABLET ORAL DAILY
Qty: 30 TABLET | Refills: 5 | Status: SHIPPED | OUTPATIENT
Start: 2019-01-07 | End: 2019-06-26

## 2019-01-07 RX ORDER — TACROLIMUS 0.5 MG/1
CAPSULE ORAL
Qty: 60 CAPSULE | Refills: 0 | Status: SHIPPED | OUTPATIENT
Start: 2019-01-07 | End: 2019-02-26 | Stop reason: DRUGHIGH

## 2019-01-07 RX ORDER — TACROLIMUS 1 MG/1
6 CAPSULE ORAL 2 TIMES DAILY
Qty: 360 CAPSULE | Refills: 11 | Status: SHIPPED | OUTPATIENT
Start: 2019-01-07 | End: 2019-01-07

## 2019-01-07 RX ORDER — PREDNISONE 5 MG/1
5 TABLET ORAL DAILY
Qty: 7 TABLET | Refills: 0 | Status: SHIPPED | OUTPATIENT
Start: 2019-01-07 | End: 2019-01-14

## 2019-01-07 RX ORDER — SIMETHICONE 80 MG
80 TABLET,CHEWABLE ORAL EVERY 6 HOURS PRN
Qty: 40 TABLET | Refills: 0 | Status: SHIPPED | OUTPATIENT
Start: 2019-01-07 | End: 2019-02-25

## 2019-01-07 RX ORDER — VALGANCICLOVIR 450 MG/1
450 TABLET, FILM COATED ORAL DAILY
Qty: 69 TABLET | Refills: 0 | Status: SHIPPED | OUTPATIENT
Start: 2019-01-07 | End: 2019-02-05

## 2019-01-07 RX ORDER — NICOTINE POLACRILEX 4 MG
15-30 LOZENGE BUCCAL
Qty: 30 G | Refills: 3 | Status: SHIPPED | OUTPATIENT
Start: 2019-01-07 | End: 2020-01-21

## 2019-01-07 RX ORDER — MULTIPLE VITAMINS W/ MINERALS TAB 9MG-400MCG
1 TAB ORAL DAILY
Qty: 30 TABLET | Refills: 0 | Status: SHIPPED | OUTPATIENT
Start: 2019-01-07 | End: 2019-01-24

## 2019-01-07 RX ORDER — BUMETANIDE 1 MG/1
1 TABLET ORAL DAILY
Qty: 2 TABLET | Refills: 0 | Status: SHIPPED | OUTPATIENT
Start: 2019-01-08 | End: 2019-02-25

## 2019-01-07 RX ADMIN — VALGANCICLOVIR 450 MG: 450 TABLET, FILM COATED ORAL at 09:39

## 2019-01-07 RX ADMIN — MYCOPHENOLATE MOFETIL 1000 MG: 250 CAPSULE ORAL at 18:46

## 2019-01-07 RX ADMIN — AMLODIPINE BESYLATE 10 MG: 10 TABLET ORAL at 09:45

## 2019-01-07 RX ADMIN — BUMETANIDE 1 MG: 1 TABLET ORAL at 14:08

## 2019-01-07 RX ADMIN — TACROLIMUS 6 MG: 5 CAPSULE ORAL at 18:46

## 2019-01-07 RX ADMIN — ASPIRIN 325 MG: 325 TABLET, DELAYED RELEASE ORAL at 09:44

## 2019-01-07 RX ADMIN — SIMETHICONE CHEW TAB 80 MG 80 MG: 80 TABLET ORAL at 09:40

## 2019-01-07 RX ADMIN — MYCOPHENOLATE MOFETIL 1000 MG: 250 CAPSULE ORAL at 09:39

## 2019-01-07 RX ADMIN — URSODIOL 300 MG: 300 CAPSULE ORAL at 09:44

## 2019-01-07 RX ADMIN — METOPROLOL TARTRATE 75 MG: 50 TABLET ORAL at 09:44

## 2019-01-07 RX ADMIN — SULFAMETHOXAZOLE AND TRIMETHOPRIM 1 TABLET: 400; 80 TABLET ORAL at 09:40

## 2019-01-07 RX ADMIN — PREDNISONE 5 MG: 5 TABLET ORAL at 09:44

## 2019-01-07 RX ADMIN — PANTOPRAZOLE SODIUM 40 MG: 40 TABLET, DELAYED RELEASE ORAL at 09:44

## 2019-01-07 RX ADMIN — CLOTRIMAZOLE 1 TROCHE: 10 LOZENGE ORAL at 09:40

## 2019-01-07 RX ADMIN — OXYCODONE HYDROCHLORIDE 5 MG: 5 TABLET ORAL at 08:20

## 2019-01-07 RX ADMIN — SENNOSIDES AND DOCUSATE SODIUM 1 TABLET: 8.6; 5 TABLET ORAL at 09:42

## 2019-01-07 RX ADMIN — SIMETHICONE CHEW TAB 80 MG 80 MG: 80 TABLET ORAL at 14:08

## 2019-01-07 RX ADMIN — ALFUZOSIN HYDROCHLORIDE 10 MG: 10 TABLET, EXTENDED RELEASE ORAL at 09:43

## 2019-01-07 RX ADMIN — MULTIPLE VITAMINS W/ MINERALS TAB 1 TABLET: TAB at 09:44

## 2019-01-07 RX ADMIN — TACROLIMUS 6 MG: 5 CAPSULE ORAL at 09:39

## 2019-01-07 RX ADMIN — POLYETHYLENE GLYCOL 3350 17 G: 17 POWDER, FOR SOLUTION ORAL at 09:43

## 2019-01-07 RX ADMIN — CLOTRIMAZOLE 1 TROCHE: 10 LOZENGE ORAL at 14:08

## 2019-01-07 ASSESSMENT — ACTIVITIES OF DAILY LIVING (ADL)
ADLS_ACUITY_SCORE: 11

## 2019-01-07 NOTE — PROGRESS NOTES
CLINICAL NUTRITION SERVICES - DISCHARGE NOTE    Provided patient Boost Glucose Control coupons per previous discussion/education with patient on 1/3.     Patient s discharge needs assessed and discharge planning has been conducted with the multidisciplinary transplant care team including physicians, pharmacy, social work and transplant coordinator.    Follow up/Monitoring:  Once discharged, place outpatient nutrition consult via the transplant team if nutrition concerns arise.    Tahira Pierce MS, RD, LD  Pager [phone]

## 2019-01-07 NOTE — PROGRESS NOTES
Care Coordinator  D/I: Per Inez Baker,NP, pt may dicharge to home this afternoon and will need ATC clinic on 1/14/19 @ 0900. Per Corinne Cooper, [facility]--pt will discharge with NPH insulin Q am and will check his glucoses 3x/day--Talisha has him scheduled for endocrinology follow up on 1/14 @ 1pm with . Pt had PLC for transplant ed and DE with Catia Luo as well as 7A dietician Tahira MALDONADO spending > 1.5 hours educating about diet/carb counting. [facility]: Jeny Amritdangelo has been here to see pt last week.  A: discharge to home today  P: I have informed ATC clinic/[facility] of discharge today. I tried to have a [facility] visit this evening, however the nurse that could have seen him tonight called in ill--they will see him tomorrow morning. Pt's daughter Kaylene told me last Friday that she will take off of work on 1/14/19 to accompany her dad to ATC clinic. I have explained to bedside DALLIN Agrawal. CTS handoff done.

## 2019-01-07 NOTE — PLAN OF CARE
"/50 (BP Location: Left arm)   Pulse 63   Temp 98.1  F (36.7  C) (Oral)   Resp 16   Ht 1.702 m (5' 7\")   Wt 84.1 kg (185 lb 8 oz)   SpO2 99%   BMI 29.05 kg/m    Pt alert and orient on room air.   Oxycodone 5 mg given X 1 for pain. Denied nausea.   Blood glucose monitored and corrected as charted. Diabetes education review completed with  and Alicia Cooper NP.   Walked in the hallway X 2 this shift.   Ate well for both meals.   Right KARINE taken out by Inez Baker NP.  Bumex initiated. Bilateral Lymphedema wraps in place.   Adequate UOP. BM on night shift.   Plan is for pt's daughter to pick him up at 5 pm today for discharge.   Right PIV removed by NST. Left PIV in place at this time.  "

## 2019-01-07 NOTE — PLAN OF CARE
Discharge Planner PT  7A  Patient plan for discharge: Home today  Current status: Discussion had with pt regarding equipment needs for discharge, as pt previously expressing interest in sock aide and reacher per OT, though pt reports he is not interested in these devices any longer and demonstrates ability to perform figure-4 technique for donning/doffing socks. Pt observed ambulating in hallway independently with no balance or safety concerns. Pt with no further concerns in regards to mobility prior to discharge and NP present to remove drain, therefore further activity deferred.  Barriers to return to prior living situation: None per PT  Recommendations for discharge: Home with assist for heavier ADLs, home RN, HHPT  Rationale for recommendations: Pt will require assist for ADLs that require lifting >10lbs in order to adhere to post-surgical precautions. Pt would benefit from home RN to assist with medication management, HHPT to progress strength and endurance within the home environment.        Entered by: Nubia Up 01/07/2019 11:52 AM     Physical Therapy Discharge Summary    Reason for therapy discharge:    Discharged to home.    Progress towards therapy goal(s). See goals on Care Plan in [facility] electronic health record for goal details.  Goals met    Therapy recommendation(s):    Continued therapy is recommended.  Rationale/Recommendations:  See above.

## 2019-01-07 NOTE — PLAN OF CARE
OT 7A: No appointment made for OT with  this date. Per chart review and PT, pt with no concerns for safe discharge home this date. Completing ADLs SBA-mod IND within precautions.     Occupational Therapy Discharge Summary    Reason for therapy discharge:    Discharged to home.    Progress towards therapy goal(s). See goals on Care Plan in [facility] electronic health record for goal details.  Goals partially met.  Barriers to achieving goals:   discharge from facility.    Therapy recommendation(s):    No further therapy is recommended.

## 2019-01-07 NOTE — PLAN OF CARE
VS: VSS on RA  B at 0200  Pain/Nausea: denied pain or nausea overnight  Diet: tolerating a high CHO diet  LDA: PIV saline locked  GI: one soft BM noted overnight  : voiding, not always saving  Skin: incision well approximated with staples, IVETT, no drainage noted. KARINE putting out small amounts of yellow fluid.   Mobility: able to ambulate independently  Plan: discharge home today

## 2019-01-07 NOTE — PLAN OF CARE
"/58 (BP Location: Left arm)   Pulse 63   Temp 97.7  F (36.5  C) (Axillary)   Resp 18   Ht 1.702 m (5' 7\")   Wt 84.1 kg (185 lb 8 oz)   SpO2 97%   BMI 29.05 kg/m    6800-4992  Afebrile, all other VSS on RA. BGs 214 and 190, covered with Novolog as ordered. Pt was able to dial up both his Novolog and lantus dose this evening- please continue to reinforce this education. Denied pain and nausea throughout shift. Good appetite on consistent carb diet. Bilateral PIVs saline locked. Voiding adequate amounts, doesn't save. No BM this shift, pt had 1 loose one this morning. Abdominal incision well approximated with staples. RJP with 50 ml yellow output, dressings intact. Up independently, ambulated in the halls. Plan for discharge home tomorrow. Please continue to monitor and update team with any changes.      "

## 2019-01-07 NOTE — PROGRESS NOTES
Met with Johan and  to assure that he knows who I am, understands process of communicating w/ the transplant office, knows how to reach us if he has questions, doesn't feel well.  I reviewed his immunosuppression with him and the importance of taking it daily as ordered.  Johan denied questions.  Per Justyna, his nurse, his wife works and he will be at home at times during the day by himself.  Johan seems to be feeling well and admits that this will be OK for him.  I have left a message for his daughter but she hasn't returned my call.    I will call Johan tomorrow to check in.

## 2019-01-07 NOTE — PHARMACY-TRANSPLANT NOTE
Solid Organ Transplant Recipient Prior to Discharge Note    63 year old male s/p liver  transplant on 12/22/18.    Pharmacy has monitored for medication interactions and immunosuppression levels in conjunction with the multidisciplinary team. In anticipation for discharge, medication therapy needs have been addressed daily throughout the current admission via multidisciplinary rounds and/or discussions, order verification, daily clinical pharmacy review, and communication with prescribers.  Kim Vizcaino, Pharm.D., [facility]  Pager [phone]

## 2019-01-07 NOTE — PROGRESS NOTES
Diabetes Consult Daily  Progress Note          Assessment/Plan:     Mr. Loy Limon is a 64 yo man with a history of Laennec's cirrhosis with HCC, portal hypertension, latent TB, now s/p disease donor liver transplant on 12/22/2018.    No  Prior diagnosis of diabetes before transplant but labs consistent with prediabetes (hemoglobin A1c 5.8% on admission). Insulin needs continue to decrease as pt gets further out from last methylpred dose. Initiation of pred 5 mg 1/4/19.    Post-liver transplant hyperglycemia - Type 2 Diabetes    Plan while inpatient:  -glargine  10 units daily at 1800 (with supper)  -continue medium corrective insulin  - unable to use Linagliptin, primary team would prefer no oral medication x1 month or >, would need to contact transplant prior to using .  -monitor glucose ac, hs and 0200       Plan for discharge:  -NPH 10 units in the morning with Prednisone 5 mg   - Test glucose three times per day, before meals and keep a lodge  -advised to stop NPH if not taking Prednisone and to continue to test glcusoe  -Has follow-up appointment on Monday, January 14 with Endocrinology                           Outpatient diabetes follow up: On Monday, January 14, in endocrinology clinic  Plan discussed with patient and primary team.           Interval History:   The last 24 hours progress and nursing notes reviewed.    Patient seen with   Blood sugars are moderately controlled and will spike into the 200-300 range after  breakfast/pre-meal lunch 2/2 prednisone 5 mg in the morning.  On Lantus 10 units, am glucose 96 to 103  Requires an easy plan for discharge, therefore changed from lantus to NPH ( once daily injection)  Reviewed discharge plan with mr. Limon. Discussed how to give injection and when .  Discussed having to mix the NPH and it should look cloudy  All questions were answered.    Per primary team, will stay on Prednisone 5 mg daily for approximately 1  "week and if LFT ae improved, will be off prednisone      Recent Labs   Lab 01/07/19  0824 01/07/19  0610 01/07/19  0205 01/06/19  2207 01/06/19  1714 01/06/19  1112 01/06/19  0749 01/06/19  0612  01/05/19  0547  01/04/19  0534  01/03/19  0542  01/02/19  0605   GLC  --  103*  --   --   --   --   --  104*  --  96  --  62*  --  68*  --  139*   *  --  124* 193* 214* 384* 96  --    < >  --    < >  --    < >  --    < >  --     < > = values in this interval not displayed.               Review of Systems:   See interval hx          Medications:     Orders Placed This Encounter      High Consistent CHO Diet      Diet       Physical Exam:  Gen: Alert, resting in bed, in NAD   HEENT: mucous membranes are moist  Resp: non-labored  Ext: moving all extremteis  Neuro:oriented x3, communicating clearly  /48 (BP Location: Left arm)   Pulse 63   Temp 98.1  F (36.7  C) (Oral)   Resp 16   Ht 1.702 m (5' 7\")   Wt 84.1 kg (185 lb 8 oz)   SpO2 97%   BMI 29.05 kg/m             Data:     Lab Results   Component Value Date    A1C 5.8 12/22/2018              CBC RESULTS:   Recent Labs   Lab Test 01/07/19  0610   WBC 5.5   RBC 2.57*   HGB 8.0*   HCT 25.0*   MCV 97   MCH 31.1   MCHC 32.0   RDW 14.8        Recent Labs   Lab Test 01/07/19  0610 01/06/19  0612    137   POTASSIUM 4.8 5.1   CHLORIDE 106 107   CO2 26 24   ANIONGAP 6 6   * 104*   BUN 14 15   CR 0.87 0.85   YELENA 7.8* 7.7*     Liver Function Studies -   Recent Labs   Lab Test 01/07/19  0610   PROTTOTAL 4.9*   ALBUMIN 1.9*   BILITOTAL 0.6   ALKPHOS 641*   AST 15   *     Lab Results   Component Value Date    INR 1.14 12/31/2018    INR 1.36 12/23/2018    INR 1.61 12/23/2018    INR 2.04 12/22/2018    INR 1.77 12/22/2018    INR 1.61 11/07/2018    INR 2.10 10/12/2018    INR 1.95 09/18/2018    INR 1.52 08/15/2018    INR 1.56 07/19/2018    INR 1.41 07/02/2018    INR 1.58 06/18/2018         I spent a total of 35 minutes bedside and on the inpatient " unit managing the glycemic care of Loy Limon. Over 50% of my time on the unit was spent counseling the patient  and/or coordinating care regarding .  See note for details.      Corinne Cooper -[phone]  Diabetes Management job code [id number]

## 2019-01-08 ENCOUNTER — TELEPHONE (OUTPATIENT)
Dept: TRANSPLANT | Facility: CLINIC | Age: 64
End: 2019-01-08

## 2019-01-08 ENCOUNTER — TELEPHONE (OUTPATIENT)
Dept: FAMILY MEDICINE | Facility: CLINIC | Age: 64
End: 2019-01-08

## 2019-01-08 ENCOUNTER — MEDICAL CORRESPONDENCE (OUTPATIENT)
Dept: HEALTH INFORMATION MANAGEMENT | Facility: CLINIC | Age: 64
End: 2019-01-08

## 2019-01-08 NOTE — PLAN OF CARE
DISCHARGE:  Patient with orders to discharge home with [facility] to see the patient tomorrow.     Education Provided:   Med Card: Medication care updated to match discharge instructions. Med card discussed with both the patient (with  present) and patients daughter.   Lab Book: Lab book updated. Family educated that they need to bring both the med card and lab book to the clinic with the them on Monday January 14, 2019 at 0845.   Handouts: Patient provided handouts about edema, diabetes educations, Transplant Handbook, and Transplant Medications. Some handouts were in Yakut while other handouts were in English (daughter able to read English).  Specialty Pharmacy: Provided education. Also provided patient with pill box to take home.   LDAs: PIV removed before discharge. KARINE drain on RLQ removed earlier in the day by NP.     Discharge instructions, medications & follow ups reviewed with patient (with  present) and daughter. Copy of discharge summary given to patient and copy placed in the patients chart. PIV L Forearm removed. [facility] notified, writer of this note left message for [facility].    Patient in stable condition. AVSS. Patient and daughter had no further questions regarding discharge instructions and medications. Patient left unit accompanied by his daughter and wife. Daughter will be provided transportation home. Plan for [facility] to come see patient tomorrow morning and patients next clinic appointment is January 14, patient educated that he needs to arrive around 0845 with his medications, lab book, and med card. Pt also educated to not take his morning dose of Tacrolimus the day of his clinic visit.

## 2019-01-08 NOTE — TELEPHONE ENCOUNTER
[facility] Call Center    Phone Message    May a detailed message be left on voicemail: yes    Reason for Call: Order(s): Home Care Orders: Other: eval for OT and PT and skilled nursing 3 times a week for 1 week, 2 times a week for 5 weeks, and 5 PRN visits/ Ali would like to see patient as soon as tomorrow. please advise!    Action Taken: Message routed to:  [facility] MICHELE MCDONALD

## 2019-01-09 ENCOUNTER — TELEPHONE (OUTPATIENT)
Dept: TRANSPLANT | Facility: CLINIC | Age: 64
End: 2019-01-09

## 2019-01-09 DIAGNOSIS — Z94.4 LIVER TRANSPLANTED (H): Primary | ICD-10-CM

## 2019-01-09 NOTE — TELEPHONE ENCOUNTER
Call to Loy with the assistance of a  to assure he is doing OK, has what he needs.  The homecare nurse, Destiny was there making her second visit so I was also able to talk w/ her.  She reports that johan is doing fine.  His Tung site is draining a bit but wound looks good.  I reminded Johan that he needs to hold his prograf tomorrow morning until the labs are drawn.  The homecare nurse reinforced this too.  Says overall he is doing well.  Johan knows how to contact us if he has questions or concerns.  [facility]

## 2019-01-10 ENCOUNTER — TELEPHONE (OUTPATIENT)
Dept: TRANSPLANT | Facility: CLINIC | Age: 64
End: 2019-01-10

## 2019-01-10 LAB
ALBUMIN SERPL-MCNC: 2.3 G/DL (ref 3.4–5)
ALP SERPL-CCNC: 632 U/L (ref 40–150)
ALT SERPL W P-5'-P-CCNC: 74 U/L (ref 0–70)
ANION GAP SERPL CALCULATED.3IONS-SCNC: 3 MMOL/L (ref 3–14)
AST SERPL W P-5'-P-CCNC: 23 U/L (ref 0–45)
BILIRUB DIRECT SERPL-MCNC: 0.3 MG/DL (ref 0–0.2)
BILIRUB SERPL-MCNC: 0.7 MG/DL (ref 0.2–1.3)
BUN SERPL-MCNC: 14 MG/DL (ref 7–30)
CALCIUM SERPL-MCNC: 8 MG/DL (ref 8.5–10.1)
CHLORIDE SERPL-SCNC: 107 MMOL/L (ref 94–109)
CO2 SERPL-SCNC: 29 MMOL/L (ref 20–32)
CREAT SERPL-MCNC: 0.9 MG/DL (ref 0.66–1.25)
ERYTHROCYTE [DISTWIDTH] IN BLOOD BY AUTOMATED COUNT: 14.7 % (ref 10–15)
GFR SERPL CREATININE-BSD FRML MDRD: 89 ML/MIN/{1.73_M2}
GLUCOSE SERPL-MCNC: 125 MG/DL (ref 70–99)
HCT VFR BLD AUTO: 28.1 % (ref 40–53)
HGB BLD-MCNC: 9.2 G/DL (ref 13.3–17.7)
MAGNESIUM SERPL-MCNC: 1.6 MG/DL (ref 1.6–2.3)
MCH RBC QN AUTO: 31.3 PG (ref 26.5–33)
MCHC RBC AUTO-ENTMCNC: 32.7 G/DL (ref 31.5–36.5)
MCV RBC AUTO: 96 FL (ref 78–100)
PHOSPHATE SERPL-MCNC: 3.2 MG/DL (ref 2.5–4.5)
PLATELET # BLD AUTO: 309 10E9/L (ref 150–450)
POTASSIUM SERPL-SCNC: 5.2 MMOL/L (ref 3.4–5.3)
PROT SERPL-MCNC: 5.8 G/DL (ref 6.8–8.8)
RBC # BLD AUTO: 2.94 10E12/L (ref 4.4–5.9)
SODIUM SERPL-SCNC: 139 MMOL/L (ref 133–144)
TACROLIMUS BLD-MCNC: 4.5 UG/L (ref 5–15)
TME LAST DOSE: ABNORMAL H
WBC # BLD AUTO: 4.3 10E9/L (ref 4–11)

## 2019-01-10 PROCEDURE — 84100 ASSAY OF PHOSPHORUS: CPT | Performed by: TRANSPLANT SURGERY

## 2019-01-10 PROCEDURE — 80197 ASSAY OF TACROLIMUS: CPT | Performed by: TRANSPLANT SURGERY

## 2019-01-10 PROCEDURE — 80076 HEPATIC FUNCTION PANEL: CPT | Performed by: TRANSPLANT SURGERY

## 2019-01-10 PROCEDURE — 83735 ASSAY OF MAGNESIUM: CPT | Performed by: TRANSPLANT SURGERY

## 2019-01-10 PROCEDURE — 80048 BASIC METABOLIC PNL TOTAL CA: CPT | Performed by: TRANSPLANT SURGERY

## 2019-01-10 PROCEDURE — 85027 COMPLETE CBC AUTOMATED: CPT | Performed by: TRANSPLANT SURGERY

## 2019-01-10 NOTE — TELEPHONE ENCOUNTER
Transplant Social Work Services Phone Call      Data: Loy has financial concerns with his inability to work post liver transplant.  While patient was inpatient Elise Barrow, JOY  met with patient and offered a mark to cover his [place] EPD premiums.  Unfortunately, patient did not bring in the required information for this mark to be submitted.  Intervention: I called patient's daughter Kaylene and discussed the above.  I submitted a $500 [facility] mark  to Kira Tena.  Assessment: Per KATHARINE Maurice EPD premiums cannot be paid online and have to be paid in person or by mail, therefore a mark for patient's rent will be requesting instead.  Patient has a financial need for assistance.  Education provided by : [facility] Mark Request Process  Plan: Patient or daughter Kaylene will call Kira Tena with patient's rental information so the [facility] mark can be processed.      ABRAHAM Gaming, [facility]  Liver Transplant   Phone [phone]  Pager [phone]

## 2019-01-11 ENCOUNTER — TELEPHONE (OUTPATIENT)
Dept: FAMILY MEDICINE | Facility: CLINIC | Age: 64
End: 2019-01-11

## 2019-01-11 ENCOUNTER — TELEPHONE (OUTPATIENT)
Dept: TRANSPLANT | Facility: CLINIC | Age: 64
End: 2019-01-11

## 2019-01-11 DIAGNOSIS — Z94.4 LIVER TRANSPLANT RECIPIENT (H): ICD-10-CM

## 2019-01-11 RX ORDER — TACROLIMUS 1 MG/1
8 CAPSULE ORAL 2 TIMES DAILY
Qty: 480 CAPSULE | Refills: 3 | Status: SHIPPED | OUTPATIENT
Start: 2019-01-11 | End: 2019-02-26

## 2019-01-11 NOTE — TELEPHONE ENCOUNTER
Verbal orders given to Chayo from [facility], per Dr. Anne, for Order(s): Home Care Orders: Occupational Therapy (OT): Request orders for OT for 5 visits in the month of Jan. Lala Beaulieu LPN 1/11/2019 4:48 PM

## 2019-01-11 NOTE — TELEPHONE ENCOUNTER
Tacrolimus level 4.5.  Goal is 10-15    Please instruct ( need to use ) patient to increase tacrolimus dose to 8 mg in the morning and 8 mg in the evening.

## 2019-01-11 NOTE — TELEPHONE ENCOUNTER
Pt will increase his Tacrolimus dose from 7 mg bid to 8 mg bid.   His daughter was also contacted and make aware of the dose change. She will stop by to see him this afternoon to make sure that his pill box is filled accurately.

## 2019-01-11 NOTE — TELEPHONE ENCOUNTER
[facility] Call Center    Phone Message    May a detailed message be left on voicemail: yes    Reason for Call: Order(s): Home Care Orders: Occupational Therapy (OT): Request orders for OT for 5 visits in the month of Jan.     Action Taken: Message routed to:  [facility] PCC      Verbal order given and documented. Lala Beaulieu LPN 1/11/2019 4:47 PM

## 2019-01-11 NOTE — TELEPHONE ENCOUNTER
[facility] Call Center    Phone Message    May a detailed message be left on voicemail: yes    Reason for Call: Order(s): Home Care Orders: Physical Therapy (PT): 2x a week for 2 weeks - 1x a week for 1 week    Action Taken: Message routed to:  [facility] PCC

## 2019-01-14 ENCOUNTER — OFFICE VISIT (OUTPATIENT)
Dept: TRANSPLANT | Facility: CLINIC | Age: 64
End: 2019-01-14

## 2019-01-14 ENCOUNTER — ALLIED HEALTH/NURSE VISIT (OUTPATIENT)
Dept: INFUSION THERAPY | Facility: CLINIC | Age: 64
End: 2019-01-14
Attending: TRANSPLANT SURGERY
Payer: MEDICARE

## 2019-01-14 ENCOUNTER — TELEPHONE (OUTPATIENT)
Dept: TRANSPLANT | Facility: CLINIC | Age: 64
End: 2019-01-14

## 2019-01-14 ENCOUNTER — OFFICE VISIT (OUTPATIENT)
Dept: PHARMACY | Facility: CLINIC | Age: 64
End: 2019-01-14
Payer: COMMERCIAL

## 2019-01-14 ENCOUNTER — OFFICE VISIT (OUTPATIENT)
Dept: ENDOCRINOLOGY | Facility: CLINIC | Age: 64
End: 2019-01-14
Payer: MEDICARE

## 2019-01-14 VITALS
SYSTOLIC BLOOD PRESSURE: 112 MMHG | BODY MASS INDEX: 25.48 KG/M2 | OXYGEN SATURATION: 97 % | WEIGHT: 162.7 LBS | TEMPERATURE: 97.8 F | DIASTOLIC BLOOD PRESSURE: 66 MMHG

## 2019-01-14 VITALS
BODY MASS INDEX: 25.43 KG/M2 | HEART RATE: 66 BPM | DIASTOLIC BLOOD PRESSURE: 66 MMHG | SYSTOLIC BLOOD PRESSURE: 112 MMHG | HEIGHT: 67 IN | WEIGHT: 162 LBS

## 2019-01-14 DIAGNOSIS — Z94.4 LIVER REPLACED BY TRANSPLANT (H): Primary | ICD-10-CM

## 2019-01-14 DIAGNOSIS — Z94.4 LIVER TRANSPLANT RECIPIENT (H): ICD-10-CM

## 2019-01-14 DIAGNOSIS — T38.0X5A STEROID-INDUCED HYPERGLYCEMIA: Primary | ICD-10-CM

## 2019-01-14 DIAGNOSIS — R73.9 STEROID-INDUCED HYPERGLYCEMIA: Primary | ICD-10-CM

## 2019-01-14 DIAGNOSIS — Z94.4 LIVER TRANSPLANT RECIPIENT (H): Primary | ICD-10-CM

## 2019-01-14 DIAGNOSIS — Z94.4 LIVER REPLACED BY TRANSPLANT (H): ICD-10-CM

## 2019-01-14 DIAGNOSIS — R73.03 PRE-DIABETES: ICD-10-CM

## 2019-01-14 DIAGNOSIS — R60.9 EDEMA, UNSPECIFIED TYPE: ICD-10-CM

## 2019-01-14 DIAGNOSIS — D84.9 IMMUNOSUPPRESSION (H): ICD-10-CM

## 2019-01-14 LAB
ALBUMIN SERPL-MCNC: 2.7 G/DL (ref 3.4–5)
ALP SERPL-CCNC: 607 U/L (ref 40–150)
ALT SERPL W P-5'-P-CCNC: 49 U/L (ref 0–70)
ANION GAP SERPL CALCULATED.3IONS-SCNC: 8 MMOL/L (ref 3–14)
AST SERPL W P-5'-P-CCNC: 16 U/L (ref 0–45)
BILIRUB DIRECT SERPL-MCNC: 0.3 MG/DL (ref 0–0.2)
BILIRUB SERPL-MCNC: 0.6 MG/DL (ref 0.2–1.3)
BUN SERPL-MCNC: 21 MG/DL (ref 7–30)
CALCIUM SERPL-MCNC: 8.4 MG/DL (ref 8.5–10.1)
CHLORIDE SERPL-SCNC: 104 MMOL/L (ref 94–109)
CO2 SERPL-SCNC: 23 MMOL/L (ref 20–32)
CREAT SERPL-MCNC: 0.94 MG/DL (ref 0.66–1.25)
ERYTHROCYTE [DISTWIDTH] IN BLOOD BY AUTOMATED COUNT: 14.5 % (ref 10–15)
GFR SERPL CREATININE-BSD FRML MDRD: 85 ML/MIN/{1.73_M2}
GLUCOSE SERPL-MCNC: 150 MG/DL (ref 70–99)
HCT VFR BLD AUTO: 28.7 % (ref 40–53)
HGB BLD-MCNC: 9.9 G/DL (ref 13.3–17.7)
MAGNESIUM SERPL-MCNC: 1.6 MG/DL (ref 1.6–2.3)
MCH RBC QN AUTO: 31.6 PG (ref 26.5–33)
MCHC RBC AUTO-ENTMCNC: 34.5 G/DL (ref 31.5–36.5)
MCV RBC AUTO: 92 FL (ref 78–100)
PHOSPHATE SERPL-MCNC: 3.7 MG/DL (ref 2.5–4.5)
PLATELET # BLD AUTO: 243 10E9/L (ref 150–450)
POTASSIUM SERPL-SCNC: 4.4 MMOL/L (ref 3.4–5.3)
PROT SERPL-MCNC: 6.4 G/DL (ref 6.8–8.8)
RBC # BLD AUTO: 3.13 10E12/L (ref 4.4–5.9)
SODIUM SERPL-SCNC: 135 MMOL/L (ref 133–144)
TACROLIMUS BLD-MCNC: 8 UG/L (ref 5–15)
TME LAST DOSE: NORMAL H
WBC # BLD AUTO: 5.9 10E9/L (ref 4–11)

## 2019-01-14 PROCEDURE — 85027 COMPLETE CBC AUTOMATED: CPT | Performed by: TRANSPLANT SURGERY

## 2019-01-14 PROCEDURE — 83735 ASSAY OF MAGNESIUM: CPT | Performed by: TRANSPLANT SURGERY

## 2019-01-14 PROCEDURE — 36415 COLL VENOUS BLD VENIPUNCTURE: CPT

## 2019-01-14 PROCEDURE — 97803 MED NUTRITION INDIV SUBSEQ: CPT | Mod: 59

## 2019-01-14 PROCEDURE — 99607 MTMS BY PHARM ADDL 15 MIN: CPT | Performed by: PHARMACIST

## 2019-01-14 PROCEDURE — 80048 BASIC METABOLIC PNL TOTAL CA: CPT | Performed by: TRANSPLANT SURGERY

## 2019-01-14 PROCEDURE — 99605 MTMS BY PHARM NP 15 MIN: CPT | Performed by: PHARMACIST

## 2019-01-14 PROCEDURE — 80076 HEPATIC FUNCTION PANEL: CPT | Performed by: TRANSPLANT SURGERY

## 2019-01-14 PROCEDURE — 84100 ASSAY OF PHOSPHORUS: CPT | Performed by: TRANSPLANT SURGERY

## 2019-01-14 PROCEDURE — 80197 ASSAY OF TACROLIMUS: CPT | Performed by: TRANSPLANT SURGERY

## 2019-01-14 PROCEDURE — G0463 HOSPITAL OUTPT CLINIC VISIT: HCPCS

## 2019-01-14 RX ORDER — MYCOPHENOLATE MOFETIL 250 MG/1
750 CAPSULE ORAL 2 TIMES DAILY
Qty: 180 CAPSULE | Refills: 11 | Status: SHIPPED | OUTPATIENT
Start: 2019-01-14 | End: 2019-02-25

## 2019-01-14 ASSESSMENT — MIFFLIN-ST. JEOR: SCORE: 1478.46

## 2019-01-14 ASSESSMENT — PAIN SCALES - GENERAL: PAINLEVEL: NO PAIN (0)

## 2019-01-14 NOTE — PROGRESS NOTES
Transplant Surgery -OUTPATIENT IMMUNOSUPPRESSION PROGRESS NOTE    Date of Visit: 2019    Transplants:  2018 (Liver); Postoperative day:  23  ASSESMENT AND PLAN:  1.Graft Function: Liver allograft: no rejection or technical problems.    2.Immunosuppression Management: keep tacrolimus levels at 10  3.Hypertension: ok  4.Renal Function: good  5.Lab frequency: twice weekly  6.Other:  Wounds healed well    Date: 2019    Transplant:                               Liver                               Kidney                              Pancreas                               Other:             Chief Complaint:No chief complaint on file.  Doing well, no fever or abdominal pain    History of Present Illness:Patient Active Problem List:     Cirrhosis of liver (H)     Liver lesion     HCC (hepatocellular carcinoma) (H)     Liver transplant recipient (H)     Immunosuppressed status (H)     Hypervolemia     Atrial fibrillation with rapid ventricular response (H)     Hematuria     Bilateral wheezing     Hypoxia     Hyperglycemia     Pre-diabetes     Essential hypertension     Constipation     Biliary stricture of transplanted liver (H)     Drug-induced diabetes mellitus (H)    SOCIAL /FAMILY HISTORY:                   No recent change    Past Medical History:  2018: Biliary stricture of transplanted liver (H)  No date: Cancer (H)      Comment:  hepatocellualr carcinoma  2018: Cirrhosis of liver (H)  No date: Enlarged prostate  No date: Inguinal hernia      Comment:  Repaired with mesh on 18: Liver lesion  2018: Liver transplanted (H)      Comment:   donor liver transplant  No date: Portal vein thrombosis      Comment:  on path explant  2018: Postoperative atrial fibrillation (H)  2018: Pre-diabetes  Past Surgical History:  No date: APPENDECTOMY  No date: COLONOSCOPY      Comment:  2018: ENDOSCOPIC RETROGRADE CHOLANGIOPANCREATOGRAM; N/A       Comment:  Procedure: ENDOSCOPIC RETROGRADE CHOLANGIOPANCREATOGRAM,               with Billary Sphincterotomy and Billary Stent Placement;                Surgeon: Omero Lawler MD;  Location:  OR  2018: HERNIORRHAPHY INGUINAL      Comment:  Procedure: Herniorrhaphy inguinal;  Surgeon:                Emil Echevarria MD;  Location:  OR  2018: IR LIVER BIOPSY PERCUTANEOUS  2018: TRANSPLANT LIVER RECIPIENT  DONOR; N/A      Comment:  Procedure: TRANSPLANT LIVER RECIPIENT  DONOR;                 Surgeon: Emil Echevarria MD;  Location:  OR  Social History    Socioeconomic History      Marital status:       Spouse name: Not on file      Number of children: Not on file      Years of education: Not on file      Highest education level: Not on file    Social Needs      Financial resource strain: Not on file      Food insecurity - worry: Not on file      Food insecurity - inability: Not on file      Transportation needs - medical: Not on file      Transportation needs - non-medical: Not on file    Occupational History      Not on file    Tobacco Use      Smoking status: Former Smoker        Packs/day: 0.03        Years: 20.00        Pack years: .6        Types: Cigarettes, Cigars        Start date: 1970        Quit date: 3/14/2018        Years since quittin.8      Smokeless tobacco: Never Used      Tobacco comment: Quit smoking 2018, one cigarette after dinner    Substance and Sexual Activity      Alcohol use: No        Comment: Quit drinking 2018      Drug use: No      Sexual activity: Not on file    Other Topics      Concerns:        Parent/sibling w/ CABG, MI or angioplasty before 65F 55M?: Not Asked    Social History Narrative      Born in [place], came to [place].       Has worked in [employer]    Prescription Medications as of 2019       Rx Number Disp Refills Start End Last Dispensed Date Next  Fill Date   Owning Pharmacy    alfuzosin (UROXATRAL) 10 MG 24 hr tablet            Sig: Take 10 mg by mouth daily    Class: Historical    Route: Oral    amLODIPine (NORVASC) 10 MG tablet  30 tablet 11 1/7/2019    [address]    Sig: Take 1 tablet (10 mg) by mouth daily    Class: E-Prescribe    Route: Oral    aspirin (ASA) 325 MG EC tablet  30 tablet 5 1/7/2019    [address]    Sig: Take 1 tablet (325 mg) by mouth daily    Class: E-Prescribe    Route: Oral    blood glucose (NO BRAND SPECIFIED) lancets standard  200 each 11 1/7/2019 1/7/2020   [address]    Sig: Use to test blood sugar 4 times daily or as directed.    Class: E-Prescribe    blood glucose (ONETOUCH VERIO IQ) test strip  200 strip 11 1/7/2019 1/7/2020   [address]    Sig: Use to test blood sugar 4 times daily or as directed.    Class: E-Prescribe    Notes to Pharmacy: Onetouch Verio Flex strips    bumetanide (BUMEX) 1 MG tablet  2 tablet 0 1/8/2019 1/10/2019   [address]    Sig: Take 1 tablet (1 mg) by mouth daily for 2 days    Class: E-Prescribe    Route: Oral    glucose 40 % (400 mg/mL) GEL gel  30 g 3 1/7/2019    [address]    Sig: Take 15-30 g by mouth every 15 minutes as needed for low blood sugar      Class: E-Prescribe    Route: Oral    insulin isophane human (HUMULIN N PEN) 100 UNIT/ML injection  1 mL 11   1/7/2019 1/7/2020   [address]    Sig: 10 units before breakfast daily. STOP when done with prednisone   pills.    Class: E-Prescribe    insulin pen needle (32G X 4 MM) 32G X 4 MM miscellaneous  100 each 3    2019   [address]    Sig: Use once pen needles daily or as directed.    Class: E-Prescribe    metoprolol tartrate 75 MG TABS  60 tablet 11 2019    [address]    Sig: Take 75 mg by mouth 2 times daily    Class: E-Prescribe    Route: Oral    multivitamin w/minerals (THERA-VIT-M) tablet  30 tablet 0 2019   [address]    Sig: Take 1 tablet by mouth daily    Class: E-Prescribe    Route: Oral    mycophenolate (GENERIC EQUIVALENT) 250 MG capsule  240 capsule 11   2019    [address]    Sig: Take 4 capsules (1,000 mg) by mouth 2 times daily    Class: E-Prescribe    Route: Oral    omeprazole (PRILOSEC) 20 MG CR capsule    2018       Sig: Take 20 mg by mouth daily     Class: Historical    Route: Oral    polyethylene glycol (MIRALAX/GLYCOLAX) powder  850 g 3 2019      [address]    Sig: Take 17 g (1 capful) by mouth 2 times daily Hold for loose stools    Class: E-Prescribe    Route: Oral    senna-docusate (SENOKOT-S/PERICOLACE) 8.6-50 MG tablet  120 tablet 0   2019   [address]    Sig: Take 2 tablets by mouth 2 times daily Hold for loose stools    Class: E-Prescribe    Route: Oral    Sharps Container MISC  1 each 2 2019    [address]    Si each daily    Class: E-Prescribe    Route: Does not apply    simethicone (MYLICON) 80 MG chewable tablet  40 tablet 0 2019   [address]    Sig: Take 1 tablet (80 mg) by mouth every 6 hours as needed for   flatulence  or cramping    Class: E-Prescribe    Route: Oral    sulfamethoxazole-trimethoprim (BACTRIM/SEPTRA) 400-80 MG tablet  30   tablet 5 1/7/2019    [facility] [address]    Sig: Take 1 tablet by mouth daily    Class: E-Prescribe    Route: Oral    tacrolimus (GENERIC EQUIVALENT) 0.5 MG capsule  60 capsule 0 1/7/2019      [facility] [address]    Sig: Take 1 cap by mouth twice daily as directed by Transplant Center for   dose adjustment    Class: E-Prescribe    tacrolimus (GENERIC EQUIVALENT) 1 MG capsule  480 capsule 3 1/11/2019      [facility] [address]    Sig: Take 8 capsules (8 mg) by mouth 2 times daily    Class: E-Prescribe    Notes to Pharmacy: Dose increase    Route: Oral    traMADol (ULTRAM) 50 MG tablet  15 tablet 0 1/7/2019    [facility]   [address]    Sig: Take 1 tablet (50 mg) by mouth every 6 hours as needed for moderate   pain    Class: Local Print    Route: Oral    ursodiol (ACTIGALL) 250 MG tablet  60 tablet 11 1/7/2019    [address]    Sig: Take 1 tablet (250 mg) by mouth 2 times daily    Class: E-Prescribe    Route: Oral    valGANciclovir (VALCYTE) 450 MG tablet  69 tablet 0 1/7/2019 3/17/2019     [facility] [address]    Sig: Take 1 tablet (450 mg) by mouth daily    Class: E-Prescribe    Route: Oral      Patient has no known allergies.   REVIEW OF SYSTEMS (check box if normal)                 GENERAL                   PULMONARY                 GENITOURINARY                  CNS                                  CARDIAC                   ENDOCRINE                  EARS,NOSE,THROAT                  GASTROINTESTINAL                  NEUROLOGIC                    [facility]                     HEMATOLOGY      PHYSICAL EXAM (check box if normal)There were no vitals taken for this visit.                    GENERAL:           EYES:  ICTERIC           YES                      NO             EXTREMITIES: Clubbing        Y                N               EARS, NOSE, THROAT: Membranes Moist    YES                      NO                              LUNGS:  CLEAR    YES                         NO                                               SKIN: Jaundice           YES                         NO                       Rash: YES                         NO                                                      HEART: Regular Rate          YES                         NO                       Incision Clean:  YES                         NO                                                        ABDOMEN: Wound healthy Organomegaly YES                         NO                                               NEUROLOGICAL:  Nonfocal  YES                         NO                                               Hernia YES                         NO                       PSYCHIATRIC:  Appropriate  YES                         NO                           OTHER:                                                                                                   PAIN SCALE:: 2          ROS      Physical Exam

## 2019-01-14 NOTE — TELEPHONE ENCOUNTER
Here today for first post liver transplant follow-up.  Accompanied by granddaughter, Summer and wife, Drea.  Reports feeling well.  Urged him to be active.    Has lab book, knows how to obtain and record labs.  Reviewed lab results and assisted with interpretation / understanding.  Brought med card, knows how to make changes.  Home care is assisting him w/ medication set up.    Appetite very good.  Encouraged small, frequent meals.    Activity:  Increasing activity.  Reminded of lifting restriction, encouraged to increase activity as tolerated.    Pain management:  No pain    Staples: to be removed from liver transplant and inguinal hernia incision today.    Biliary stent: Is ERCP patient, next due last February.    Lab frequency:  Monday and Thursday per homecare.    Current Immunosuppression: Prograf bid, Cellcept bid    Med changes:    Decreased cellcept to 750 mg po bid..    Homecare:  Twice weekly.    Recommended follow-up:    1.)  PCP - Dr. Berumen for glucose, BP and general health follow -up asap  2.)  Surgeon:  Dr. Echevarria in 2 weeks  3.)  Hepatology - future  5.)  Endocrinology - today    Patient has my contact information and knows how to contact afterhours for assistance / questions.

## 2019-01-14 NOTE — PATIENT INSTRUCTIONS
1. You can stop your NPH insulin now that the prednisone taper is complete  2. Continue to check your blood sugars at least twice daily (once in the morning, and then once before either breakfast or dinner)  3. Follow up in clinic in one month to review your sugars and discuss wether you need to be on medication to control your blood sugars.   4. Continue to monitor your carbohydrate intake, use the materials the dietician gave you.

## 2019-01-14 NOTE — LETTER
2019        RE: Loy Limon  [address]  [place] [address]     Dear Colleague,    Thank you for referring your patient, Loy Limon, to the [facility]ID ORGAN TRANSPLANT at [facility]. Please see a copy of my visit note below.    Transplant Social Work Services Progress Note      Collaborated with: Liver Transplant Team    Data: Loy is s/p  donor Liver Transplant and [facility] up.  He is twenty-three days post liver transplant.  Intervention/Education: I met with Loy, his wife Drea, granddaughter Johanny and a  and we reviewed the followin. Writing to the Donor Family process        2. Liver Transplant Support Group         3. Immunosuppression copays-no current concerns.  I reminded patient/wife to contact me if they have future concerns and we can evaluate for financial assistance programs, grants, etc.        4. Adjustment to illness-Coping appropriately post surgery, optimistic about recovery thus far.        5. Importance of maintaining abstinence from all non-prescribed drugs and alcohol.        6. [facility] mark submitted but a copy of patient's lease is needed.  I drafted a letter and patient signed this letter granting Kira Tena ([employer]) permission to obtain a copy of patient's lease.  Assessment: Patient is overall doing quite well.  He has strongly support from his family evidenced by their presence today.   Plan: I will remain available for the psychosocial needs of this patient.         ABRAHAM Gaming, [facility]  Liver Transplant   Phone [phone]  Pager [phone]    Again, thank you for allowing me to participate in the care of your patient.      Sincerely,    ABRAHAM Rios

## 2019-01-14 NOTE — PROGRESS NOTES
SUBJECTIVE/OBJECTIVE:                           Loy Limon is a 63 year old male coming in for an initial visit for Medication Therapy Management.  He was referred to me from txp team.    Chief Complaint: 23 days post transplant, Discharged on 1/7/19.    Allergies/ADRs: Reviewed in Epic  Tobacco: No tobacco use  Alcohol: not currently using  PMH: Reviewed in Epic    Medication Adherence/Access:  Patient uses pill box(es) and has assistance with medication administration: home care or family member (his daughter helps if the nurse does not come in). Checks off when he takes his medication.   Patient takes medications 2 time(s) per day.   Per patient, misses medication 0 times per week.    The patient fills medications at [place]: YES.  Refills: discussed.   Patient seen in hospital by [facility]: yes  Patient receive supplies: yes  Stools: Stopped Miralax and GASX due to diarrhea 2 days ago. Stools have been normal since stopping these. Still taking Senna/Doc 2 tabs BID.     Liver Transplant:  Current immunosuppressants include TAC 8mg BID and MMF 1000mg BID.  Pt reports no side effects  Transplant date: 12/22/18  Estimated Creatinine Clearance: 84.8 mL/min (based on SCr of 0.9 mg/dL).  CMV prophylaxis: Valcyte 450mg daily Treat 3 months post tx   PCP prophylaxis: Bactrim S S daily for 6 months post txp  Antifungal Prophylaxis: Not needed thus far   PPI use: Not currently taking Omeprazole.   Current supplements for electrolyte replacement: MVI daily.  Tx Coordinator: Michael De La Rosa MD: Dr. Carballo, Using Med Card: Yes  Home BPs:   AM: 128/61, 122/61, 124/65, 111/70, 126/62  PM: 132/67, 124/60, 130/64, 130/56, 107/55  Immunizations: annual flu shot UTD, [id number]:  2018; Prevnar 13: 2018, TDaP:  Due 2028, Shignrix: UTD    Edema: Well controlled per patient report. No longer on bumex.     Diabetes:  Pt currently taking NPH 10 units daily, plan to stop once finishing prednisone. Pt is not experiencing side  effects.  SMBG: four times daily.   Ranges (patient reported):   Date FBG/ 2hours post Lunch/2hours post Dinner /2hours post    1/13 126 195 191 152   1/12 142 182 218 181   1/11 135 263 163 187   1/10 127 241 192 183   1/9 87 219 221 159   1/8  145 308 194   1/7    249   Patient is not experiencing hypoglycemia  Recent symptoms of high blood sugar? None    Today's Vitals: There were no vitals taken for this visit.      ASSESSMENT:                             Current medications were reviewed today. Reviewed the next few days of pill boxes, they were set up correctly per the med chart/ med card. Omeprazole, Alfuzosin patient stopped taking since transplant. Prednisone and Bumex therapies have been completed. Prednisone last dose was today.     Medication Adherence: good, no issues identified    Liver Transplant:  Stable. Stools normal taking Senna/Doc 2 tablets BID.     Edema: Stable.     Diabetes:  Pt is seeing endocrine today for diabetes management. Last plan was to discontinue NPH once prednisone was completed. Last dose of prednisone was today. Discussed general BG goals. Pt brought in all his NPH pens today. Discussed correct storage of these medications.      PLAN:                            Pt to...  1. Store unused NPH pens in the fridge. Pt to see Endocrine today for BG management    I spent 40 minutes with this patient today. I offer these suggestions for consideration by txp team. A copy of the visit note was provided to the patient's referring provider.    Will follow up in 3 months.    The patient was given a summary of these recommendations as an after visit summary.     Amador Taylor, [place]  [facility] Pharmacist    Phone: [phone]

## 2019-01-14 NOTE — LETTER
1/14/2019       RE: Loy Limon  [address]  [place] [address]     Dear Colleague,    Thank you for referring your patient, Loy Limon, to the [facility] ENDOCRINOLOGY at [facility]. Please see a copy of my visit note below.    [facility] End[facility]- Outpatient Diabetes Follow up      Loy is a 63 year old male with history of Laennec's cirrhosis with HCC, portal hypertension, latent Tb who underwent living donor liver transplant 12/22/2018. He has remained on prednisone taper since his discharge from the hospital on 1/7/19, last dose was today and per transplant team, does not need further prednisone at this time. He continues on Tacrolimus and Mycophenolate s/p transplant. His tacro dose was increased from 7-8 mg BID 1/11.    Patient endorses compliance with his medication regimen, and has kept a log of his glucoses; an excerpt is below. Overall glucoses are trending down to less than 200 the majority of the time.   1/9 fasting 87, pre-lunch 219, pre-dinner, 221, bedtime 159  1/11 fasting 135, pre-lunch 263, pre-dinner, 163, bedtime 187  1/13 fasting 126, pre-lunch 195, pre-dinner, 191, bedtime 152    BMP from this morning with glucose of 150.    Pt denies headaches, visual changes, n/v, SOB at rest. He is endorsing some non-exertional lower chest pain after eating lunch today. No accompanying shortness of breath, increase with exertion, palpitations. He recently stopped taking omeprazole. He thinks he ate too much too fast.     Current Diabetes Regimen:   NPH 10 units daily AM with prednisone --taper complete as of 1/14.     Glucometer Settings  Patient left meter at home, but has log book with him   See above for recent glucose data     Weight: has lost 20 lbs since hospital discharge. Reports appetite has been very poor but is improving now.   Physical Activity: improving   Nutrition: improving. Eating chicken, cheese, vegetables, and some  fruit. He met with the dietician today who gave him information on diabetic diet choices.     Diabetes Care  Retinopathy: no    Nephropathy: BP well controlled.  Creatinine 0.94 (stable).    Neuropathy: no    ROS: 10 point review of systems completed; pertinent positives and negatives documented in HPI    Allergies  No Known Allergies    Medications  Current Outpatient Medications   Medication Sig Dispense Refill     alfuzosin (UROXATRAL) 10 MG 24 hr tablet Take 10 mg by mouth daily       amLODIPine (NORVASC) 10 MG tablet Take 1 tablet (10 mg) by mouth daily 30 tablet 11     aspirin (ASA) 325 MG EC tablet Take 1 tablet (325 mg) by mouth daily 30 tablet 5     blood glucose (NO BRAND SPECIFIED) lancets standard Use to test blood sugar 4 times daily or as directed. 200 each 11     blood glucose (ONETOUCH VERIO IQ) test strip Use to test blood sugar 4 times daily or as directed. 200 strip 11     bumetanide (BUMEX) 1 MG tablet Take 1 tablet (1 mg) by mouth daily for 2 days 2 tablet 0     glucose 40 % (400 mg/mL) GEL gel Take 15-30 g by mouth every 15 minutes as needed for low blood sugar 30 g 3     insulin pen needle (32G X 4 MM) 32G X 4 MM miscellaneous Use once pen needles daily or as directed. 100 each 3     metoprolol tartrate 75 MG TABS Take 75 mg by mouth 2 times daily 60 tablet 11     multivitamin w/minerals (THERA-VIT-M) tablet Take 1 tablet by mouth daily 30 tablet 0     mycophenolate (GENERIC EQUIVALENT) 250 MG capsule Take 4 capsules (1,000 mg) by mouth 2 times daily 240 capsule 11     omeprazole (PRILOSEC) 20 MG CR capsule Take 20 mg by mouth daily        polyethylene glycol (MIRALAX/GLYCOLAX) powder Take 17 g (1 capful) by mouth 2 times daily Hold for loose stools (Patient not taking: Reported on 1/14/2019) 850 g 3     senna-docusate (SENOKOT-S/PERICOLACE) 8.6-50 MG tablet Take 2 tablets by mouth 2 times daily Hold for loose stools 120 tablet 0     Sharps Container MISC 1 each daily 1 each 2     simethicone  (MYLICON) 80 MG chewable tablet Take 1 tablet (80 mg) by mouth every 6 hours as needed for flatulence or cramping (Patient not taking: Reported on 2019) 40 tablet 0     sulfamethoxazole-trimethoprim (BACTRIM/SEPTRA) 400-80 MG tablet Take 1 tablet by mouth daily 30 tablet 5     tacrolimus (GENERIC EQUIVALENT) 0.5 MG capsule Take 1 cap by mouth twice daily as directed by Transplant Center for dose adjustment 60 capsule 0     tacrolimus (GENERIC EQUIVALENT) 1 MG capsule Take 8 capsules (8 mg) by mouth 2 times daily 480 capsule 3     traMADol (ULTRAM) 50 MG tablet Take 1 tablet (50 mg) by mouth every 6 hours as needed for moderate pain (Patient not taking: Reported on 2019) 15 tablet 0     ursodiol (ACTIGALL) 250 MG tablet Take 1 tablet (250 mg) by mouth 2 times daily 60 tablet 11     valGANciclovir (VALCYTE) 450 MG tablet Take 1 tablet (450 mg) by mouth daily 69 tablet 0       Family History  family history includes Liver Disease in his brother.    Social History   reports that he quit smoking about 10 months ago. His smoking use included cigarettes and cigars. He started smoking about 48 years ago. He has a 0.60 pack-year smoking history. he has never used smokeless tobacco. He reports that he does not drink alcohol or use drugs.     Past Medical History  Past Medical History:   Diagnosis Date     Biliary stricture of transplanted liver (H) 2018     Cancer (H)     hepatocellualr carcinoma     Cirrhosis of liver (H) 2018     Enlarged prostate      Inguinal hernia     Repaired with mesh on 18     Liver lesion 2018     Liver transplanted (H) 2018     donor liver transplant     Portal vein thrombosis     on path explant     Postoperative atrial fibrillation (H) 2018     Pre-diabetes 2018       Past Surgical History:   Procedure Laterality Date     APPENDECTOMY       COLONOSCOPY      2018     ENDOSCOPIC RETROGRADE CHOLANGIOPANCREATOGRAM N/A 2018    Procedure:  "ENDOSCOPIC RETROGRADE CHOLANGIOPANCREATOGRAM, with Billary Sphincterotomy and Billary Stent Placement;  Surgeon: Omero Lawler MD;  Location: [facility] OR     HERNIORRHAPHY INGUINAL  2018    Procedure: Herniorrhaphy inguinal;  Surgeon: Emil Echevarria MD;  Location: [facility] OR     IR LIVER BIOPSY PERCUTANEOUS  2018     TRANSPLANT LIVER RECIPIENT  DONOR N/A 2018    Procedure: TRANSPLANT LIVER RECIPIENT  DONOR;  Surgeon: Emil Echevarria MD;  Location: [facility] OR       Physical Exam  /66   Pulse 66   Ht 1.702 m (5' 7\")   Wt 73.5 kg (162 lb)   BMI 25.37 kg/m    Body mass index is 25.37 kg/m .    GENERAL : In no apparent distress. Family at bedside.   SKIN: Normal color, normal temperature, texture.  No  suspicious lesions or rashes  EYES: PERRLA.  No proptosis.  MOUTH: Moist, pink; pharynx clear  NECK: No visible masses.  RESP: normal respiratory effort.  cough   ABDOMEN: soft, nontender to palpation   NEURO: awake, alert, responds appropriately to questions.  Moves all extremities; Gait normal.     EXTREMITIES: No ulcers or open wounds.     RESULTS    Lab Results   Component Value Date    A1C 5.8 2018       Creatinine   Date Value Ref Range Status   2019 0.94 0.66 - 1.25 mg/dL Final     GFR Estimate   Date Value Ref Range Status   2019 85 >60 mL/min/[1.73_m2] Final     Comment:     Non  GFR Calc  Starting 2018, serum creatinine based estimated GFR (eGFR) will be   calculated using the Chronic Kidney Disease Epidemiology Collaboration   (CKD-EPI) equation.       Hemoglobin A1C   Date Value Ref Range Status   2018 5.8 (H) 0 - 5.6 % Final     Comment:     Normal <5.7% Prediabetes 5.7-6.4%  Diabetes 6.5% or higher - adopted from ADA   consensus guidelines.       Potassium   Date Value Ref Range Status   2019 4.4 3.4 - 5.3 mmol/L Final     ALT   Date Value Ref Range Status   2019 49 0 - 70 U/L Final     AST   Date Value " Ref Range Status   01/14/2019 16 0 - 45 U/L Final     TSH   Date Value Ref Range Status   07/19/2018 6.36 (H) 0.40 - 4.00 mU/L Final     T4 Free   Date Value Ref Range Status   07/19/2018 1.04 0.76 - 1.46 ng/dL Final       TSH   Date Value Ref Range Status   07/19/2018 6.36 (H) 0.40 - 4.00 mU/L Final     T4 Free   Date Value Ref Range Status   07/19/2018 1.04 0.76 - 1.46 ng/dL Final       Creatinine   Date Value Ref Range Status   01/14/2019 0.94 0.66 - 1.25 mg/dL Final     No results for input(s): CHOL, HDL, LDL, TRIG, CHOLHDLRATIO in the last 60895 hours.    No results found for: [id number], [id number], [id number]      ASSESSMENT/PLAN:    1. Steroid induced, post-liver transplant hyperglycemia. Now off prednisone, continues on tacrolimus and mycophenolate   -most recent A1c 5.8 on 12/22/19   -patient may stop NPH insulin at this time   -continue checking glucose once in the morning before breakfast, and once before either lunch or dinner   -due to long term use of anti-rejection medications, he may need ongoing treatment for hyperglycemia in the future; have considered Linagliptin but transplant team prefers pt to be farther out from transplant prior to considering oral antihyperglycemics. Patient will monitor glucose as above for the next month and follow up for further discussion.    2. Elevated TSH: 6.36 on 7/19/18, with normal free T4.    -recheck labs in 6 months.       Follow up:  1. With me in 1 month    The patient is  enrolled in [employer] services    I spent 45 minutes with this patient face to face and explained the conditions and plans (more than 50% of time was counseling/coordination of care, diabetes management, follow up plan for worsening hyper and hypoglycemia) . The patient understood and is satisfied with today's visit.     ARLETH Coffey, PA-C  [facility]   Pager [phone]

## 2019-01-14 NOTE — PROGRESS NOTES
[facility] Endocrinology- Outpatient Diabetes Follow up      Loy is a 63 year old male with history of Laennec's cirrhosis with HCC, portal hypertension, latent Tb who underwent living donor liver transplant 12/22/2018. He has remained on prednisone taper since his discharge from the hospital on 1/7/19, last dose was today and per transplant team, does not need further prednisone at this time. He continues on Tacrolimus and Mycophenolate s/p transplant. His tacro dose was increased from 7-8 mg BID 1/11.    Patient endorses compliance with his medication regimen, and has kept a log of his glucoses; an excerpt is below. Overall glucoses are trending down to less than 200 the majority of the time.   1/9 fasting 87, pre-lunch 219, pre-dinner, 221, bedtime 159  1/11 fasting 135, pre-lunch 263, pre-dinner, 163, bedtime 187  1/13 fasting 126, pre-lunch 195, pre-dinner, 191, bedtime 152    BMP from this morning with glucose of 150.    Pt denies headaches, visual changes, n/v, SOB at rest. He is endorsing some non-exertional lower chest pain after eating lunch today. No accompanying shortness of breath, increase with exertion, palpitations. He recently stopped taking omeprazole. He thinks he ate too much too fast.     Current Diabetes Regimen:   NPH 10 units daily AM with prednisone --taper complete as of 1/14.     Glucometer Settings  Patient left meter at home, but has log book with him   See above for recent glucose data     Weight: has lost 20 lbs since hospital discharge. Reports appetite has been very poor but is improving now.   Physical Activity: improving   Nutrition: improving. Eating chicken, cheese, vegetables, and some fruit. He met with the dietician today who gave him information on diabetic diet choices.     Diabetes Care  Retinopathy: no    Nephropathy: BP well controlled.  Creatinine 0.94 (stable).    Neuropathy: no    ROS: 10 point review of systems completed; pertinent positives and negatives  documented in HPI    Allergies  No Known Allergies    Medications  Current Outpatient Medications   Medication Sig Dispense Refill     alfuzosin (UROXATRAL) 10 MG 24 hr tablet Take 10 mg by mouth daily       amLODIPine (NORVASC) 10 MG tablet Take 1 tablet (10 mg) by mouth daily 30 tablet 11     aspirin (ASA) 325 MG EC tablet Take 1 tablet (325 mg) by mouth daily 30 tablet 5     blood glucose (NO BRAND SPECIFIED) lancets standard Use to test blood sugar 4 times daily or as directed. 200 each 11     blood glucose (ONETOUCH VERIO IQ) test strip Use to test blood sugar 4 times daily or as directed. 200 strip 11     bumetanide (BUMEX) 1 MG tablet Take 1 tablet (1 mg) by mouth daily for 2 days 2 tablet 0     glucose 40 % (400 mg/mL) GEL gel Take 15-30 g by mouth every 15 minutes as needed for low blood sugar 30 g 3     insulin pen needle (32G X 4 MM) 32G X 4 MM miscellaneous Use once pen needles daily or as directed. 100 each 3     metoprolol tartrate 75 MG TABS Take 75 mg by mouth 2 times daily 60 tablet 11     multivitamin w/minerals (THERA-VIT-M) tablet Take 1 tablet by mouth daily 30 tablet 0     mycophenolate (GENERIC EQUIVALENT) 250 MG capsule Take 4 capsules (1,000 mg) by mouth 2 times daily 240 capsule 11     omeprazole (PRILOSEC) 20 MG CR capsule Take 20 mg by mouth daily        polyethylene glycol (MIRALAX/GLYCOLAX) powder Take 17 g (1 capful) by mouth 2 times daily Hold for loose stools (Patient not taking: Reported on 1/14/2019) 850 g 3     senna-docusate (SENOKOT-S/PERICOLACE) 8.6-50 MG tablet Take 2 tablets by mouth 2 times daily Hold for loose stools 120 tablet 0     Sharps Container MISC 1 each daily 1 each 2     simethicone (MYLICON) 80 MG chewable tablet Take 1 tablet (80 mg) by mouth every 6 hours as needed for flatulence or cramping (Patient not taking: Reported on 1/14/2019) 40 tablet 0     sulfamethoxazole-trimethoprim (BACTRIM/SEPTRA) 400-80 MG tablet Take 1 tablet by mouth daily 30 tablet 5      tacrolimus (GENERIC EQUIVALENT) 0.5 MG capsule Take 1 cap by mouth twice daily as directed by Transplant Center for dose adjustment 60 capsule 0     tacrolimus (GENERIC EQUIVALENT) 1 MG capsule Take 8 capsules (8 mg) by mouth 2 times daily 480 capsule 3     traMADol (ULTRAM) 50 MG tablet Take 1 tablet (50 mg) by mouth every 6 hours as needed for moderate pain (Patient not taking: Reported on 2019) 15 tablet 0     ursodiol (ACTIGALL) 250 MG tablet Take 1 tablet (250 mg) by mouth 2 times daily 60 tablet 11     valGANciclovir (VALCYTE) 450 MG tablet Take 1 tablet (450 mg) by mouth daily 69 tablet 0       Family History  family history includes Liver Disease in his brother.    Social History   reports that he quit smoking about 10 months ago. His smoking use included cigarettes and cigars. He started smoking about 48 years ago. He has a 0.60 pack-year smoking history. he has never used smokeless tobacco. He reports that he does not drink alcohol or use drugs.     Past Medical History  Past Medical History:   Diagnosis Date     Biliary stricture of transplanted liver (H) 2018     Cancer (H)     hepatocellualr carcinoma     Cirrhosis of liver (H) 2018     Enlarged prostate      Inguinal hernia     Repaired with mesh on 18     Liver lesion 2018     Liver transplanted (H) 2018     donor liver transplant     Portal vein thrombosis     on path explant     Postoperative atrial fibrillation (H) 2018     Pre-diabetes 2018       Past Surgical History:   Procedure Laterality Date     APPENDECTOMY       COLONOSCOPY      2018     ENDOSCOPIC RETROGRADE CHOLANGIOPANCREATOGRAM N/A 2018    Procedure: ENDOSCOPIC RETROGRADE CHOLANGIOPANCREATOGRAM, with Billary Sphincterotomy and Billary Stent Placement;  Surgeon: Omero Lawler MD;  Location:  OR     HERNIORRHAPHY INGUINAL  2018    Procedure: Herniorrhaphy inguinal;  Surgeon: Emil Echevarria MD;  Location:   "OR     IR LIVER BIOPSY PERCUTANEOUS  2018     TRANSPLANT LIVER RECIPIENT  DONOR N/A 2018    Procedure: TRANSPLANT LIVER RECIPIENT  DONOR;  Surgeon: Emil Echevarria MD;  Location: [facility] OR       Physical Exam  /66   Pulse 66   Ht 1.702 m (5' 7\")   Wt 73.5 kg (162 lb)   BMI 25.37 kg/m    Body mass index is 25.37 kg/m .    GENERAL : In no apparent distress. Family at bedside.   SKIN: Normal color, normal temperature, texture.  No  suspicious lesions or rashes  EYES: PERRLA.  No proptosis.  MOUTH: Moist, pink; pharynx clear  NECK: No visible masses.  RESP: normal respiratory effort.  cough   ABDOMEN: soft, nontender to palpation   NEURO: awake, alert, responds appropriately to questions.  Moves all extremities; Gait normal.     EXTREMITIES: No ulcers or open wounds.     RESULTS    Lab Results   Component Value Date    A1C 5.8 2018       Creatinine   Date Value Ref Range Status   2019 0.94 0.66 - 1.25 mg/dL Final     GFR Estimate   Date Value Ref Range Status   2019 85 >60 mL/min/[1.73_m2] Final     Comment:     Non  GFR Calc  Starting 2018, serum creatinine based estimated GFR (eGFR) will be   calculated using the Chronic Kidney Disease Epidemiology Collaboration   (CKD-EPI) equation.       Hemoglobin A1C   Date Value Ref Range Status   2018 5.8 (H) 0 - 5.6 % Final     Comment:     Normal <5.7% Prediabetes 5.7-6.4%  Diabetes 6.5% or higher - adopted from ADA   consensus guidelines.       Potassium   Date Value Ref Range Status   2019 4.4 3.4 - 5.3 mmol/L Final     ALT   Date Value Ref Range Status   2019 49 0 - 70 U/L Final     AST   Date Value Ref Range Status   2019 16 0 - 45 U/L Final     TSH   Date Value Ref Range Status   2018 6.36 (H) 0.40 - 4.00 mU/L Final     T4 Free   Date Value Ref Range Status   2018 1.04 0.76 - 1.46 ng/dL Final       TSH   Date Value Ref Range Status   2018 6.36 (H) 0.40 " - 4.00 mU/L Final     T4 Free   Date Value Ref Range Status   07/19/2018 1.04 0.76 - 1.46 ng/dL Final       Creatinine   Date Value Ref Range Status   01/14/2019 0.94 0.66 - 1.25 mg/dL Final     No results for input(s): CHOL, HDL, LDL, TRIG, CHOLHDLRATIO in the last 57970 hours.    No results found for: [id number], [id number], [id number]      ASSESSMENT/PLAN:    1. Steroid induced, post-liver transplant hyperglycemia. Now off prednisone, continues on tacrolimus and mycophenolate   -most recent A1c 5.8 on 12/22/19   -patient may stop NPH insulin at this time   -continue checking glucose once in the morning before breakfast, and once before either lunch or dinner   -due to long term use of anti-rejection medications, he may need ongoing treatment for hyperglycemia in the future; have considered Linagliptin but transplant team prefers pt to be farther out from transplant prior to considering oral antihyperglycemics. Patient will monitor glucose as above for the next month and follow up for further discussion.    2. Elevated TSH: 6.36 on 7/19/18, with normal free T4.    -recheck labs in 6 months.       Follow up:  1. With me in 1 month    The patient is  enrolled in [employer] services    I spent 45 minutes with this patient face to face and explained the conditions and plans (more than 50% of time was counseling/coordination of care, diabetes management, follow up plan for worsening hyper and hypoglycemia) . The patient understood and is satisfied with today's visit.     ARLETH Coffey, PA-C  [facility]   Pager [phone]

## 2019-01-14 NOTE — PATIENT INSTRUCTIONS
Dear Loy Limon    Thank you for choosing [facility] ([facility]) for your transplant cares.  The following information is a summary of our appointment as well as important reminders.        Sincerely,  DALLIN Domingo  [facility]

## 2019-01-14 NOTE — PATIENT INSTRUCTIONS
Recommendations from today's MTM visit:                                                    MTM (medication therapy management) is a service provided by a clinical pharmacist designed to help you get the most of out of your medicines.   Today we reviewed what your medicines are for, how to know if they are working, that your medicines are safe and how to make your medicine regimen as easy as possible.     1. [facility] mail order should call you 1 week before you need your medications, but if you are running low, call the number on the back of your pill box to set up delivery.     2. You blood sugar goals are  in the morning and before meals and 2 hours after meals less than 180. Store your NPH insulin in the fridge when not using it. Your diabetes doctor may set different goals for you as well.     Next MTM visit: 3 months post transplant    To schedule another MTM appointment, please call the clinic directly or you may call the MTM scheduling line at [phone] or toll-free at [phone].     My Clinical Pharmacist's contact information:                                                      It was a pleasure talking with you today!  Please feel free to contact me with any questions or concerns you have.      Amador Taylor, PharmD  MTM Pharmacist    Phone: [phone]     You may receive a survey about the MTM services you received.  I would appreciate your feedback to help me serve you better in the future. Please fill it out and return it when you can. Your comments will be anonymous.

## 2019-01-14 NOTE — PROGRESS NOTES
Outpatient MNT: Post Liver Transplant    Time Spent: 15 minutes  Visit Type: F/U (last seen by OP RD 5/2018 for txp eval)  Referring Physician: Swathi  Pt accompanied by: his wife, granddaughter, and      Date of txp: 12/22/18     Nutrition Assessment/Diet Recall  Pt has been home x 1 week and reports overall good appetite. Yesterday he ate 2 tacos with meat. Later on he had steak with broccoli and cauliflower. Drinking water and OJ. Has Boost GC at home but worried they will cause his sugars to spike too much, as this is what he reports happened in the hospital.     Anthropometrics  Height:   67 in   BMI:    29.1    Weight Status:Overweight BMI 25-29.9   Weight:  162 lbs            IBW (lb): 148  % IBW: 109    Wt Hx: Weight of 185 lbs on 1/6. Weight today in 160s. Likely down >10 lbs x 1 week 2/2 fluid status.     Adj/dosing BW: 162 lbs/74 kg       Labs  Potassium   Date Value Ref Range Status   01/14/2019 4.4 3.4 - 5.3 mmol/L Final       Estimated Nutrition Needs  Energy  1719-9478     (30-35 kcal/kg for increased needs post txp)     Protein  111-148    (1.5-2 g/kg for increased needs post txp)           Fluid  1 ml/kcal or per MD     Nutrition Diagnosis  Inadequate protein intake related to increased protein needs s/p transplant AEB diet recall shows pt is not meeting minimum of 111 g protein/day.    Nutrition Intervention  1. Discussed importance of consuming adequate calories and protein for 2 months post-txp to help heal and reduce muscle/fat wasting. Discussed sources of protein and ways to ensure he is increasing his protein intake.  - Discussed could try Boost GC again and monitor BG more closely, otherwise could focus on eating more high protein foods. Did review high protein foods in detail, but pt requested list of these foods. Will mail him either a Setswana or English food list (pt reports English is ok), as his dtr speaks/reads English. Will also mail Food Safety booklet.   -  Also reviewed basic nutrition info (carbs, protein, fat) and that carbs raise BG. Can still eat these foods, but monitor portion size of course.     2. Reviewed importance of food safety and increased risk for food-borne illness post-txp. Also discussed potential need to monitor K+, CHO, and Na+ intakes d/t medication side effects.     3. Avoid the following post txp d/t risk for rejection, unknown effects on the organs, and/or potential interactions with immunosuppressants:  - Herbal, Chinese, holistic, chiropractic, natural, alternative medicines and supplements  - Detoxes and cleanses  - Weight loss pills  - Protein powders or other products with extracts or herbs (ie green tea extract)      Patient Understanding: Pt verbalized understanding of education provided.  Expected Compliance: Good  Follow-Up Plans: PRN     Nutrition Goals  1. Increase protein to ideal minimum of 111 g protein/day  2. Pt to verbalize understanding of post txp nutrition ed provided     Provided pt with contact info.   Kathrin Thacker RD, LD  Pgr [phone]

## 2019-01-14 NOTE — PROGRESS NOTES
Transplant Social Work Services Progress Note      Collaborated with: Liver Transplant Team    Data: Loy is s/p  donor Liver Transplant and he returns to [facility] for routine follow up.  He is twenty-three days post liver transplant.  Intervention/Education: I met with Loy, his wife Drea, granddaughter Johanny and a  and we reviewed the followin. Writing to the Donor Family process        2. Liver Transplant Support Group         3. Immunosuppression copays-no current concerns.  I reminded patient/wife to contact me if they have future concerns and we can evaluate for financial assistance programs, grants, etc.        4. Adjustment to illness-Coping appropriately post surgery, optimistic about recovery thus far.        5. Importance of maintaining abstinence from all non-prescribed drugs and alcohol.        6. [facility] mark submitted but a copy of patient's lease is needed.  I drafted a letter and patient signed this letter granting Kira Tena ([employer]) permission to obtain a copy of patient's lease.  Assessment: Patient is overall doing quite well.  He has strongly support from his family evidenced by their presence today.   Plan: I will remain available for the psychosocial needs of this patient.         ABRAHAM Gaming, [facility]  Liver Transplant   Phone [phone]  Pager [phone]

## 2019-01-14 NOTE — PROGRESS NOTES
"Loy Limon came to [facility] today for a lab and assess following a liver transplant on 12/22.      Discharge date: 1/7/19  Transplant coordinator: Jeny Szymanski  Phone number patient can be reached at: [phone] (M) needs       Physical Assessment:  See physical assessment located under \"Document Flowsheets\".  Incision site: c/d/i staples. Staples removed today per verbal order from Dr. Echevarria  Lines: N/A  Cronin: N/A  Urine clarity: clear/yellow, UOP unchanged per pt report  Hydration: Denies difficulty with fluid intake, drinking well  Nutrition: No N/V, no suppressed appetite per pt   Last BM: today, per pt- he was instructed to hold stool softeners per his homecare nurse as he has been having diarrhea. Per pt, stool are returning to formed   Pain: minimal, declining any pain med use- reports 2/10 incisional discomfort with activity     Laboratory tests: Standard labs drawn.    Plan of care for today: Labs drawn and pt assessed, post transplant education reviewed with pt and family at bedside. Pt seen by Dr. Echevarria, coordinator Jeny, pharmacist Amador, and social work Janene. Pt and family report confidence in the plan moving forward and deny any questions or concerns.         Medications administered: none    Total nursing time: 30 mins      Patient education:    The following teaching topics were addressed: Incisional care, Signs/symptoms of infection, Good handwashing, Medications (purposes, doses and times of administration) and Plan of care   Patient and spouse, and granddaughter verbalized understanding and all questions answered.    Drug level:  Tac level today will be followed by transplant coordinator.     Discharge Plan    Pt will follow up with upcoming appts and homecare nurse.   Discharge instructions reviewed with patient: YES  Patient/Representative verbalized understanding, all questions answered: YES    Discharged from unit at 1200 with whom: family to home.    Chayo" DALLIN De Los Santos

## 2019-01-17 ENCOUNTER — TELEPHONE (OUTPATIENT)
Dept: TRANSPLANT | Facility: CLINIC | Age: 64
End: 2019-01-17

## 2019-01-17 NOTE — TELEPHONE ENCOUNTER
Patient Call: General    Reason for call: [facility] went to draw labs and pt forgot and took his meds  They will go back on Fri for lab draw     Call back needed? No

## 2019-01-18 LAB
ALBUMIN SERPL-MCNC: 3.2 G/DL (ref 3.4–5)
ALP SERPL-CCNC: 575 U/L (ref 40–150)
ALT SERPL W P-5'-P-CCNC: 43 U/L (ref 0–70)
ANION GAP SERPL CALCULATED.3IONS-SCNC: 8 MMOL/L (ref 3–14)
AST SERPL W P-5'-P-CCNC: 13 U/L (ref 0–45)
BILIRUB DIRECT SERPL-MCNC: 0.3 MG/DL (ref 0–0.2)
BILIRUB SERPL-MCNC: 0.8 MG/DL (ref 0.2–1.3)
BUN SERPL-MCNC: 25 MG/DL (ref 7–30)
CALCIUM SERPL-MCNC: 8.9 MG/DL (ref 8.5–10.1)
CHLORIDE SERPL-SCNC: 108 MMOL/L (ref 94–109)
CO2 SERPL-SCNC: 21 MMOL/L (ref 20–32)
CREAT SERPL-MCNC: 0.93 MG/DL (ref 0.66–1.25)
ERYTHROCYTE [DISTWIDTH] IN BLOOD BY AUTOMATED COUNT: 14.5 % (ref 10–15)
GFR SERPL CREATININE-BSD FRML MDRD: 86 ML/MIN/{1.73_M2}
GLUCOSE SERPL-MCNC: 152 MG/DL (ref 70–99)
HCT VFR BLD AUTO: 29.6 % (ref 40–53)
HGB BLD-MCNC: 10.2 G/DL (ref 13.3–17.7)
MAGNESIUM SERPL-MCNC: 1.8 MG/DL (ref 1.6–2.3)
MCH RBC QN AUTO: 31.1 PG (ref 26.5–33)
MCHC RBC AUTO-ENTMCNC: 34.5 G/DL (ref 31.5–36.5)
MCV RBC AUTO: 90 FL (ref 78–100)
PHOSPHATE SERPL-MCNC: 3.5 MG/DL (ref 2.5–4.5)
PLATELET # BLD AUTO: 207 10E9/L (ref 150–450)
POTASSIUM SERPL-SCNC: 4.8 MMOL/L (ref 3.4–5.3)
PROT SERPL-MCNC: 7.2 G/DL (ref 6.8–8.8)
RBC # BLD AUTO: 3.28 10E12/L (ref 4.4–5.9)
SODIUM SERPL-SCNC: 137 MMOL/L (ref 133–144)
TACROLIMUS BLD-MCNC: 8.3 UG/L (ref 5–15)
TME LAST DOSE: NORMAL H
WBC # BLD AUTO: 5.1 10E9/L (ref 4–11)

## 2019-01-18 PROCEDURE — 80048 BASIC METABOLIC PNL TOTAL CA: CPT | Performed by: TRANSPLANT SURGERY

## 2019-01-18 PROCEDURE — 83735 ASSAY OF MAGNESIUM: CPT | Performed by: TRANSPLANT SURGERY

## 2019-01-18 PROCEDURE — 80197 ASSAY OF TACROLIMUS: CPT | Performed by: TRANSPLANT SURGERY

## 2019-01-18 PROCEDURE — 85027 COMPLETE CBC AUTOMATED: CPT | Performed by: TRANSPLANT SURGERY

## 2019-01-18 PROCEDURE — 84100 ASSAY OF PHOSPHORUS: CPT | Performed by: TRANSPLANT SURGERY

## 2019-01-18 PROCEDURE — 80076 HEPATIC FUNCTION PANEL: CPT | Performed by: TRANSPLANT SURGERY

## 2019-01-21 LAB
ALBUMIN SERPL-MCNC: 3.5 G/DL (ref 3.4–5)
ALP SERPL-CCNC: 498 U/L (ref 40–150)
ALT SERPL W P-5'-P-CCNC: 29 U/L (ref 0–70)
ANION GAP SERPL CALCULATED.3IONS-SCNC: 7 MMOL/L (ref 3–14)
AST SERPL W P-5'-P-CCNC: 13 U/L (ref 0–45)
BILIRUB DIRECT SERPL-MCNC: 0.3 MG/DL (ref 0–0.2)
BILIRUB SERPL-MCNC: 0.8 MG/DL (ref 0.2–1.3)
BUN SERPL-MCNC: 25 MG/DL (ref 7–30)
CALCIUM SERPL-MCNC: 8.8 MG/DL (ref 8.5–10.1)
CHLORIDE SERPL-SCNC: 106 MMOL/L (ref 94–109)
CO2 SERPL-SCNC: 23 MMOL/L (ref 20–32)
CREAT SERPL-MCNC: 0.88 MG/DL (ref 0.66–1.25)
ERYTHROCYTE [DISTWIDTH] IN BLOOD BY AUTOMATED COUNT: 14.2 % (ref 10–15)
FUNGUS SPEC CULT: NORMAL
GFR SERPL CREATININE-BSD FRML MDRD: 90 ML/MIN/{1.73_M2}
GLUCOSE SERPL-MCNC: 148 MG/DL (ref 70–99)
HCT VFR BLD AUTO: 30.9 % (ref 40–53)
HGB BLD-MCNC: 10.5 G/DL (ref 13.3–17.7)
MAGNESIUM SERPL-MCNC: 1.9 MG/DL (ref 1.6–2.3)
MCH RBC QN AUTO: 31.3 PG (ref 26.5–33)
MCHC RBC AUTO-ENTMCNC: 34 G/DL (ref 31.5–36.5)
MCV RBC AUTO: 92 FL (ref 78–100)
PHOSPHATE SERPL-MCNC: 3.8 MG/DL (ref 2.5–4.5)
PLATELET # BLD AUTO: 232 10E9/L (ref 150–450)
POTASSIUM SERPL-SCNC: 4.8 MMOL/L (ref 3.4–5.3)
PROT SERPL-MCNC: 7.4 G/DL (ref 6.8–8.8)
RBC # BLD AUTO: 3.36 10E12/L (ref 4.4–5.9)
SODIUM SERPL-SCNC: 136 MMOL/L (ref 133–144)
SPECIMEN SOURCE: NORMAL
TACROLIMUS BLD-MCNC: 8 UG/L (ref 5–15)
TME LAST DOSE: NORMAL H
WBC # BLD AUTO: 3.8 10E9/L (ref 4–11)

## 2019-01-21 PROCEDURE — 84100 ASSAY OF PHOSPHORUS: CPT | Performed by: TRANSPLANT SURGERY

## 2019-01-21 PROCEDURE — 85027 COMPLETE CBC AUTOMATED: CPT | Performed by: TRANSPLANT SURGERY

## 2019-01-21 PROCEDURE — 83735 ASSAY OF MAGNESIUM: CPT | Performed by: TRANSPLANT SURGERY

## 2019-01-21 PROCEDURE — 80197 ASSAY OF TACROLIMUS: CPT | Performed by: TRANSPLANT SURGERY

## 2019-01-21 PROCEDURE — 80048 BASIC METABOLIC PNL TOTAL CA: CPT | Performed by: TRANSPLANT SURGERY

## 2019-01-21 PROCEDURE — 80076 HEPATIC FUNCTION PANEL: CPT | Performed by: TRANSPLANT SURGERY

## 2019-01-24 ENCOUNTER — DOCUMENTATION ONLY (OUTPATIENT)
Dept: FAMILY MEDICINE | Facility: CLINIC | Age: 64
End: 2019-01-24

## 2019-01-24 DIAGNOSIS — C22.0 HCC (HEPATOCELLULAR CARCINOMA) (H): ICD-10-CM

## 2019-01-24 DIAGNOSIS — K70.30 ALCOHOLIC CIRRHOSIS OF LIVER WITHOUT ASCITES (H): ICD-10-CM

## 2019-01-24 DIAGNOSIS — N40.0 BPH (BENIGN PROSTATIC HYPERPLASIA): Primary | ICD-10-CM

## 2019-01-24 DIAGNOSIS — Z94.4 LIVER TRANSPLANT RECIPIENT (H): ICD-10-CM

## 2019-01-24 LAB
ALBUMIN SERPL-MCNC: 3.4 G/DL (ref 3.4–5)
ALP SERPL-CCNC: 413 U/L (ref 40–150)
ALT SERPL W P-5'-P-CCNC: 22 U/L (ref 0–70)
ANION GAP SERPL CALCULATED.3IONS-SCNC: 8 MMOL/L (ref 3–14)
AST SERPL W P-5'-P-CCNC: 13 U/L (ref 0–45)
BILIRUB DIRECT SERPL-MCNC: 0.3 MG/DL (ref 0–0.2)
BILIRUB SERPL-MCNC: 0.7 MG/DL (ref 0.2–1.3)
BUN SERPL-MCNC: 23 MG/DL (ref 7–30)
CALCIUM SERPL-MCNC: 8.8 MG/DL (ref 8.5–10.1)
CHLORIDE SERPL-SCNC: 109 MMOL/L (ref 94–109)
CO2 SERPL-SCNC: 21 MMOL/L (ref 20–32)
CREAT SERPL-MCNC: 0.88 MG/DL (ref 0.66–1.25)
ERYTHROCYTE [DISTWIDTH] IN BLOOD BY AUTOMATED COUNT: 14 % (ref 10–15)
GFR SERPL CREATININE-BSD FRML MDRD: 90 ML/MIN/{1.73_M2}
GLUCOSE SERPL-MCNC: 149 MG/DL (ref 70–99)
HCT VFR BLD AUTO: 29.9 % (ref 40–53)
HGB BLD-MCNC: 10.1 G/DL (ref 13.3–17.7)
MAGNESIUM SERPL-MCNC: 1.7 MG/DL (ref 1.6–2.3)
MCH RBC QN AUTO: 30.5 PG (ref 26.5–33)
MCHC RBC AUTO-ENTMCNC: 33.8 G/DL (ref 31.5–36.5)
MCV RBC AUTO: 90 FL (ref 78–100)
PHOSPHATE SERPL-MCNC: 3.9 MG/DL (ref 2.5–4.5)
PLATELET # BLD AUTO: 251 10E9/L (ref 150–450)
POTASSIUM SERPL-SCNC: 4.8 MMOL/L (ref 3.4–5.3)
PROT SERPL-MCNC: 7 G/DL (ref 6.8–8.8)
RBC # BLD AUTO: 3.31 10E12/L (ref 4.4–5.9)
SODIUM SERPL-SCNC: 138 MMOL/L (ref 133–144)
TACROLIMUS BLD-MCNC: 10.1 UG/L (ref 5–15)
TME LAST DOSE: NORMAL H
WBC # BLD AUTO: 3.6 10E9/L (ref 4–11)

## 2019-01-24 PROCEDURE — 84100 ASSAY OF PHOSPHORUS: CPT | Performed by: TRANSPLANT SURGERY

## 2019-01-24 PROCEDURE — 83735 ASSAY OF MAGNESIUM: CPT | Performed by: TRANSPLANT SURGERY

## 2019-01-24 PROCEDURE — 85027 COMPLETE CBC AUTOMATED: CPT | Performed by: TRANSPLANT SURGERY

## 2019-01-24 PROCEDURE — 80197 ASSAY OF TACROLIMUS: CPT | Performed by: TRANSPLANT SURGERY

## 2019-01-24 PROCEDURE — 80076 HEPATIC FUNCTION PANEL: CPT | Performed by: TRANSPLANT SURGERY

## 2019-01-24 PROCEDURE — 80048 BASIC METABOLIC PNL TOTAL CA: CPT | Performed by: TRANSPLANT SURGERY

## 2019-01-24 RX ORDER — ALFUZOSIN HYDROCHLORIDE 10 MG/1
10 TABLET, EXTENDED RELEASE ORAL DAILY
Qty: 90 TABLET | Refills: 3 | Status: SHIPPED | OUTPATIENT
Start: 2019-01-24 | End: 2019-12-16

## 2019-01-24 RX ORDER — AMOXICILLIN 250 MG
2 CAPSULE ORAL 2 TIMES DAILY
Qty: 120 TABLET | Refills: 0 | Status: SHIPPED | OUTPATIENT
Start: 2019-01-24 | End: 2019-02-25

## 2019-01-24 RX ORDER — MULTIPLE VITAMINS W/ MINERALS TAB 9MG-400MCG
1 TAB ORAL DAILY
Qty: 90 TABLET | Refills: 3 | Status: SHIPPED | OUTPATIENT
Start: 2019-01-24 | End: 2020-01-21

## 2019-01-24 NOTE — PROGRESS NOTES
Dr. Anne,     Loy doesn't have a script at the [facility] for Alfuzosin ER, he takes 10mg PO daily. He doesn't currently have any in his home.  Is this a medication that you would like him to continue or not? If you'd like him to continue could you please send a script to the [facility]?  I also wanted to let you know that Loy's HR today was between 48-50. Looking back at his BP machine the last week his HR has ranged between 48 and 59.     Thank you,   René Curran RN Case Manager   [phone]  [email or web address]

## 2019-01-24 NOTE — TELEPHONE ENCOUNTER
Certavite/Antioxidants   Qty 30  Last Fill 01/07/2019      SM Senna-Docusate 8.6-50MG  Qty 100  Last Fill 1/07/2019    Thank you  Marquita [facility]'

## 2019-01-25 ENCOUNTER — TELEPHONE (OUTPATIENT)
Dept: INFUSION THERAPY | Facility: CLINIC | Age: 64
End: 2019-01-25

## 2019-01-25 NOTE — TELEPHONE ENCOUNTER
Call to Johan to check in with the assistance of a .  Johan reports that he is doing well, good appetite, walking, getting exercise, taking meds as ordered.  I reminded him to see his PCP (he has an appt in 2 weeks) and told him that he will be called for a follow-up appt w/ one of the transplant surgeons in the next 1-2 weeks.

## 2019-01-28 LAB
ALBUMIN SERPL-MCNC: 3.3 G/DL (ref 3.4–5)
ALP SERPL-CCNC: 359 U/L (ref 40–150)
ALT SERPL W P-5'-P-CCNC: 18 U/L (ref 0–70)
ANION GAP SERPL CALCULATED.3IONS-SCNC: 5 MMOL/L (ref 3–14)
AST SERPL W P-5'-P-CCNC: 11 U/L (ref 0–45)
BILIRUB DIRECT SERPL-MCNC: 0.3 MG/DL (ref 0–0.2)
BILIRUB SERPL-MCNC: 0.6 MG/DL (ref 0.2–1.3)
BUN SERPL-MCNC: 21 MG/DL (ref 7–30)
CALCIUM SERPL-MCNC: 8.5 MG/DL (ref 8.5–10.1)
CHLORIDE SERPL-SCNC: 111 MMOL/L (ref 94–109)
CO2 SERPL-SCNC: 23 MMOL/L (ref 20–32)
CREAT SERPL-MCNC: 0.83 MG/DL (ref 0.66–1.25)
ERYTHROCYTE [DISTWIDTH] IN BLOOD BY AUTOMATED COUNT: 13.7 % (ref 10–15)
GFR SERPL CREATININE-BSD FRML MDRD: >90 ML/MIN/{1.73_M2}
GLUCOSE SERPL-MCNC: 148 MG/DL (ref 70–99)
HCT VFR BLD AUTO: 29.8 % (ref 40–53)
HGB BLD-MCNC: 9.9 G/DL (ref 13.3–17.7)
MAGNESIUM SERPL-MCNC: 1.7 MG/DL (ref 1.6–2.3)
MCH RBC QN AUTO: 30 PG (ref 26.5–33)
MCHC RBC AUTO-ENTMCNC: 33.2 G/DL (ref 31.5–36.5)
MCV RBC AUTO: 90 FL (ref 78–100)
PHOSPHATE SERPL-MCNC: 3.2 MG/DL (ref 2.5–4.5)
PLATELET # BLD AUTO: 251 10E9/L (ref 150–450)
POTASSIUM SERPL-SCNC: 5.1 MMOL/L (ref 3.4–5.3)
PROT SERPL-MCNC: 6.9 G/DL (ref 6.8–8.8)
RBC # BLD AUTO: 3.3 10E12/L (ref 4.4–5.9)
SODIUM SERPL-SCNC: 139 MMOL/L (ref 133–144)
TACROLIMUS BLD-MCNC: 9.1 UG/L (ref 5–15)
TME LAST DOSE: NORMAL H
WBC # BLD AUTO: 3.9 10E9/L (ref 4–11)

## 2019-01-28 PROCEDURE — 80048 BASIC METABOLIC PNL TOTAL CA: CPT | Performed by: TRANSPLANT SURGERY

## 2019-01-28 PROCEDURE — 83735 ASSAY OF MAGNESIUM: CPT | Performed by: TRANSPLANT SURGERY

## 2019-01-28 PROCEDURE — 85027 COMPLETE CBC AUTOMATED: CPT | Performed by: TRANSPLANT SURGERY

## 2019-01-28 PROCEDURE — 80076 HEPATIC FUNCTION PANEL: CPT | Performed by: TRANSPLANT SURGERY

## 2019-01-28 PROCEDURE — 84100 ASSAY OF PHOSPHORUS: CPT | Performed by: TRANSPLANT SURGERY

## 2019-01-28 PROCEDURE — 80197 ASSAY OF TACROLIMUS: CPT | Performed by: TRANSPLANT SURGERY

## 2019-01-31 ENCOUNTER — DOCUMENTATION ONLY (OUTPATIENT)
Dept: FAMILY MEDICINE | Facility: CLINIC | Age: 64
End: 2019-01-31

## 2019-01-31 LAB
ALBUMIN SERPL-MCNC: 3.6 G/DL (ref 3.4–5)
ALP SERPL-CCNC: 348 U/L (ref 40–150)
ALT SERPL W P-5'-P-CCNC: 22 U/L (ref 0–70)
ANION GAP SERPL CALCULATED.3IONS-SCNC: 7 MMOL/L (ref 3–14)
AST SERPL W P-5'-P-CCNC: 15 U/L (ref 0–45)
BILIRUB DIRECT SERPL-MCNC: 0.2 MG/DL (ref 0–0.2)
BILIRUB SERPL-MCNC: 0.6 MG/DL (ref 0.2–1.3)
BUN SERPL-MCNC: 23 MG/DL (ref 7–30)
CALCIUM SERPL-MCNC: 8.8 MG/DL (ref 8.5–10.1)
CHLORIDE SERPL-SCNC: 111 MMOL/L (ref 94–109)
CO2 SERPL-SCNC: 22 MMOL/L (ref 20–32)
CREAT SERPL-MCNC: 0.81 MG/DL (ref 0.66–1.25)
ERYTHROCYTE [DISTWIDTH] IN BLOOD BY AUTOMATED COUNT: 13.5 % (ref 10–15)
GFR SERPL CREATININE-BSD FRML MDRD: >90 ML/MIN/{1.73_M2}
GLUCOSE SERPL-MCNC: 149 MG/DL (ref 70–99)
HCT VFR BLD AUTO: 30 % (ref 40–53)
HGB BLD-MCNC: 10.2 G/DL (ref 13.3–17.7)
MAGNESIUM SERPL-MCNC: 1.8 MG/DL (ref 1.6–2.3)
MCH RBC QN AUTO: 30.7 PG (ref 26.5–33)
MCHC RBC AUTO-ENTMCNC: 34 G/DL (ref 31.5–36.5)
MCV RBC AUTO: 90 FL (ref 78–100)
PHOSPHATE SERPL-MCNC: 3.5 MG/DL (ref 2.5–4.5)
PLATELET # BLD AUTO: 234 10E9/L (ref 150–450)
POTASSIUM SERPL-SCNC: 5.1 MMOL/L (ref 3.4–5.3)
PROT SERPL-MCNC: 7.1 G/DL (ref 6.8–8.8)
RBC # BLD AUTO: 3.32 10E12/L (ref 4.4–5.9)
SODIUM SERPL-SCNC: 140 MMOL/L (ref 133–144)
TACROLIMUS BLD-MCNC: 8.1 UG/L (ref 5–15)
TME LAST DOSE: NORMAL H
WBC # BLD AUTO: 4.1 10E9/L (ref 4–11)

## 2019-01-31 PROCEDURE — 80048 BASIC METABOLIC PNL TOTAL CA: CPT | Performed by: TRANSPLANT SURGERY

## 2019-01-31 PROCEDURE — 80197 ASSAY OF TACROLIMUS: CPT | Performed by: TRANSPLANT SURGERY

## 2019-01-31 PROCEDURE — 80076 HEPATIC FUNCTION PANEL: CPT | Performed by: TRANSPLANT SURGERY

## 2019-01-31 PROCEDURE — 83735 ASSAY OF MAGNESIUM: CPT | Performed by: TRANSPLANT SURGERY

## 2019-01-31 PROCEDURE — 84100 ASSAY OF PHOSPHORUS: CPT | Performed by: TRANSPLANT SURGERY

## 2019-01-31 PROCEDURE — 85027 COMPLETE CBC AUTOMATED: CPT | Performed by: TRANSPLANT SURGERY

## 2019-01-31 NOTE — PROGRESS NOTES
[facility] now requests orders and shares plan of care/discharge summaries for some patients through [employer].  Please REPLY TO THIS MESSAGE OR ROUTE BACK TO THE AUTHOR in order to give authorization for orders when needed.  This is considered a verbal order, you will still receive a faxed copy of orders for signature.  Thank you for your assistance in improving collaboration for our patients.    Dr. Anne,     I wanted to let you know that when I was out to see Loy this AM his HR was 49, asymptomatic.    Thank you,  René Curran RN Case Manager   [phone]  [email or web address]

## 2019-02-04 ENCOUNTER — OFFICE VISIT (OUTPATIENT)
Dept: TRANSPLANT | Facility: CLINIC | Age: 64
End: 2019-02-04
Attending: TRANSPLANT SURGERY
Payer: MEDICARE

## 2019-02-04 VITALS
WEIGHT: 169.2 LBS | SYSTOLIC BLOOD PRESSURE: 160 MMHG | BODY MASS INDEX: 26.56 KG/M2 | RESPIRATION RATE: 18 BRPM | HEART RATE: 56 BPM | DIASTOLIC BLOOD PRESSURE: 74 MMHG | HEIGHT: 67 IN | TEMPERATURE: 98.1 F

## 2019-02-04 DIAGNOSIS — Z94.4 LIVER TRANSPLANTED (H): Primary | ICD-10-CM

## 2019-02-04 DIAGNOSIS — Z94.4 LIVER TRANSPLANTED (H): ICD-10-CM

## 2019-02-04 DIAGNOSIS — Z94.4 LIVER REPLACED BY TRANSPLANT (H): ICD-10-CM

## 2019-02-04 LAB
ALBUMIN SERPL-MCNC: 3.3 G/DL (ref 3.4–5)
ALP SERPL-CCNC: 306 U/L (ref 40–150)
ALT SERPL W P-5'-P-CCNC: 20 U/L (ref 0–70)
ANION GAP SERPL CALCULATED.3IONS-SCNC: 4 MMOL/L (ref 3–14)
AST SERPL W P-5'-P-CCNC: 9 U/L (ref 0–45)
BILIRUB DIRECT SERPL-MCNC: 0.3 MG/DL (ref 0–0.2)
BILIRUB SERPL-MCNC: 0.6 MG/DL (ref 0.2–1.3)
BUN SERPL-MCNC: 16 MG/DL (ref 7–30)
CALCIUM SERPL-MCNC: 8.5 MG/DL (ref 8.5–10.1)
CHLORIDE SERPL-SCNC: 112 MMOL/L (ref 94–109)
CO2 SERPL-SCNC: 22 MMOL/L (ref 20–32)
CREAT SERPL-MCNC: 1 MG/DL (ref 0.66–1.25)
ERYTHROCYTE [DISTWIDTH] IN BLOOD BY AUTOMATED COUNT: 13.5 % (ref 10–15)
GFR SERPL CREATININE-BSD FRML MDRD: 78 ML/MIN/{1.73_M2}
GLUCOSE SERPL-MCNC: 135 MG/DL (ref 70–99)
HCT VFR BLD AUTO: 29.9 % (ref 40–53)
HGB BLD-MCNC: 9.6 G/DL (ref 13.3–17.7)
MAGNESIUM SERPL-MCNC: 1.9 MG/DL (ref 1.6–2.3)
MCH RBC QN AUTO: 29.8 PG (ref 26.5–33)
MCHC RBC AUTO-ENTMCNC: 32.1 G/DL (ref 31.5–36.5)
MCV RBC AUTO: 93 FL (ref 78–100)
PHOSPHATE SERPL-MCNC: 3.4 MG/DL (ref 2.5–4.5)
PLATELET # BLD AUTO: 180 10E9/L (ref 150–450)
POTASSIUM SERPL-SCNC: 5.7 MMOL/L (ref 3.4–5.3)
PROT SERPL-MCNC: 6.8 G/DL (ref 6.8–8.8)
RBC # BLD AUTO: 3.22 10E12/L (ref 4.4–5.9)
SODIUM SERPL-SCNC: 137 MMOL/L (ref 133–144)
TACROLIMUS BLD-MCNC: 7.9 UG/L (ref 5–15)
TME LAST DOSE: NORMAL H
WBC # BLD AUTO: 3.8 10E9/L (ref 4–11)

## 2019-02-04 PROCEDURE — G0463 HOSPITAL OUTPT CLINIC VISIT: HCPCS | Mod: ZF

## 2019-02-04 PROCEDURE — 85027 COMPLETE CBC AUTOMATED: CPT | Performed by: TRANSPLANT SURGERY

## 2019-02-04 PROCEDURE — 80053 COMPREHEN METABOLIC PANEL: CPT | Performed by: TRANSPLANT SURGERY

## 2019-02-04 PROCEDURE — 80197 ASSAY OF TACROLIMUS: CPT | Performed by: TRANSPLANT SURGERY

## 2019-02-04 PROCEDURE — 84100 ASSAY OF PHOSPHORUS: CPT | Performed by: TRANSPLANT SURGERY

## 2019-02-04 PROCEDURE — 36415 COLL VENOUS BLD VENIPUNCTURE: CPT | Performed by: TRANSPLANT SURGERY

## 2019-02-04 PROCEDURE — 82248 BILIRUBIN DIRECT: CPT | Performed by: TRANSPLANT SURGERY

## 2019-02-04 PROCEDURE — 83735 ASSAY OF MAGNESIUM: CPT | Performed by: TRANSPLANT SURGERY

## 2019-02-04 ASSESSMENT — MIFFLIN-ST. JEOR: SCORE: 1511.12

## 2019-02-04 ASSESSMENT — PAIN SCALES - GENERAL: PAINLEVEL: NO PAIN (0)

## 2019-02-04 NOTE — LETTER
2019      RE: Loy Limon  [address]  [facility] [place] [address]           Transplant Surgery -OUTPATIENT IMMUNOSUPPRESSION PROGRESS NOTE    Date of Visit: 2019    Transplants:  2018 (Liver); Postoperative day:  44  ASSESMENT AND PLAN:  1.Graft Function:Liver allograft: no rejection or technical problems.    2.Immunosuppression Management: keep tacrolimus levels at 8ng/dl  3.Hypertension: on norvasc 10 mg po daily  4.Renal Function: ok  5.Lab frequency: weekly and do labs today  6.Other:  Hernia wound healed well    Date: 2019    Transplant:  [x]                             Liver [x]                              Kidney []                             Pancreas []                              Other:             Chief Complaint:  Doing well, no fever or abdominal pain  History of Present Illness:  Post liver transplant    Patient Active Problem List   Diagnosis     Cirrhosis of liver (H)     Liver lesion     HCC (hepatocellular carcinoma) (H)     Liver transplant recipient (H)     Immunosuppressed status (H)     Hypervolemia     Atrial fibrillation with rapid ventricular response (H)     Hematuria     Bilateral wheezing     Hypoxia     Hyperglycemia     Pre-diabetes     Essential hypertension     Constipation     Biliary stricture of transplanted liver (H)     Drug-induced diabetes mellitus (H)     SOCIAL /FAMILY HISTORY: [x]                  No recent change    Past Medical History:   Diagnosis Date     Biliary stricture of transplanted liver (H) 2018     Cancer (H)     hepatocellualr carcinoma     Cirrhosis of liver (H) 2018     Enlarged prostate      Inguinal hernia     Repaired with mesh on 18     Liver lesion 2018     Liver transplanted (H) 2018     donor liver transplant     Portal vein thrombosis     on path explant     Postoperative atrial fibrillation (H) 2018     Pre-diabetes 2018     Past Surgical History:   Procedure  Laterality Date     APPENDECTOMY       COLONOSCOPY           ENDOSCOPIC RETROGRADE CHOLANGIOPANCREATOGRAM N/A 2018    Procedure: ENDOSCOPIC RETROGRADE CHOLANGIOPANCREATOGRAM, with Billary Sphincterotomy and Billary Stent Placement;  Surgeon: Omero Lawler MD;  Location: [facility] OR     HERNIORRHAPHY INGUINAL  2018    Procedure: Herniorrhaphy inguinal;  Surgeon: Emil Echevarria MD;  Location: [facility] OR     IR LIVER BIOPSY PERCUTANEOUS  2018     TRANSPLANT LIVER RECIPIENT  DONOR N/A 2018    Procedure: TRANSPLANT LIVER RECIPIENT  DONOR;  Surgeon: Emil Echevarria MD;  Location: [facility] OR     Social History     Socioeconomic History     Marital status:      Spouse name: Not on file     Number of children: Not on file     Years of education: Not on file     Highest education level: Not on file   Social Needs     Financial resource strain: Not on file     Food insecurity - worry: Not on file     Food insecurity - inability: Not on file     Transportation needs - medical: Not on file     Transportation needs - non-medical: Not on file   Occupational History     Not on file   Tobacco Use     Smoking status: Former Smoker     Packs/day: 0.03     Years: 20.00     Pack years: 0.60     Types: Cigarettes, Cigars     Start date: 1970     Last attempt to quit: 3/14/2018     Years since quittin.8     Smokeless tobacco: Never Used     Tobacco comment: Quit smoking 2018, one cigarette after dinner   Substance and Sexual Activity     Alcohol use: No     Comment: Quit drinking 2018     Drug use: No     Sexual activity: Not on file   Other Topics Concern     Parent/sibling w/ CABG, MI or angioplasty before 65F 55M? Not Asked   Social History Narrative    Born in [place], came to [place] years ago.     Has worked in [email or web address], [employer]     Prescription Medications as of 2019       Rx Number Disp Refills Start End Last Dispensed Date Next  Fill Date Owning Pharmacy    alfuzosin ER (UROXATRAL) 10 MG 24 hr tablet  90 tablet 3 1/24/2019    [facility] [place] [address] [address]     Sig: Take 1 tablet (10 mg) by mouth daily    Class: E-Prescribe    Route: Oral    amLODIPine (NORVASC) 10 MG tablet  30 tablet 11 1/7/2019    [address]    Sig: Take 1 tablet (10 mg) by mouth daily    Class: E-Prescribe    Route: Oral    aspirin (ASA) 325 MG EC tablet  30 tablet 5 1/7/2019    [address]    Sig: Take 1 tablet (325 mg) by mouth daily    Class: E-Prescribe    Route: Oral    blood glucose (NO BRAND SPECIFIED) lancets standard  200 each 11 1/7/2019 1/7/2020   [address]    Sig: Use to test blood sugar 4 times daily or as directed.    Class: E-Prescribe    blood glucose (ONETOUCH VERIO IQ) test strip  200 strip 11 1/7/2019 1/7/2020   [address]    Sig: Use to test blood sugar 4 times daily or as directed.    Class: E-Prescribe    Notes to Pharmacy: Onetouch Verio Flex strips    metoprolol tartrate 75 MG TABS  60 tablet 11 1/7/2019    [address]    Sig: Take 75 mg by mouth 2 times daily    Class: E-Prescribe    Route: Oral    multivitamin w/minerals (THERA-VIT-M) tablet  90 tablet 3 1/24/2019    [facility] [place] [address] [address]    Sig: Take 1 tablet by mouth daily    Class: E-Prescribe    Route: Oral    mycophenolate (GENERIC EQUIVALENT) 250 MG capsule  180 capsule 11 1/14/2019    [facility] [place] [address] [address]    Sig: Take 3 capsules (750 mg) by mouth 2 times daily    Class: E-Prescribe    Route: Oral    omeprazole (PRILOSEC) 20 MG CR capsule    4/4/2018 4/4/2019        Sig: Take 20 mg by mouth daily     Class: Historical    Route: Oral    polyethylene glycol (MIRALAX/GLYCOLAX) powder  850 g 3 2019    [address]    Sig: Take 17 g (1 capful) by mouth 2 times daily Hold for loose stools    Class: E-Prescribe    Route: Oral    senna-docusate (SENOKOT-S/PERICOLACE) 8.6-50 MG tablet  120 tablet 0 2019    [facility] [address]    Sig: Take 2 tablets by mouth 2 times daily Hold for loose stools    Class: E-Prescribe    Route: Oral    Sharps Container MISC  1 each 2 2019    [address]    Si each daily    Class: E-Prescribe    Route: Does not apply    simethicone (MYLICON) 80 MG chewable tablet  40 tablet 0 2019   [address]    Sig: Take 1 tablet (80 mg) by mouth every 6 hours as needed for flatulence or cramping    Class: E-Prescribe    Route: Oral    sulfamethoxazole-trimethoprim (BACTRIM/SEPTRA) 400-80 MG tablet  30 tablet 5 2019    [address]    Sig: Take 1 tablet by mouth daily    Class: E-Prescribe    Route: Oral    tacrolimus (GENERIC EQUIVALENT) 0.5 MG capsule  60 capsule 0 2019    [address]    Sig: Take 1 cap by mouth twice daily as directed by Transplant Center for dose adjustment    Class: E-Prescribe    tacrolimus (GENERIC EQUIVALENT) 1 MG capsule  480 capsule 3 2019    [facility]/[address]    Sig: Take 8 capsules (8 mg) by mouth 2 times daily    Class: E-Prescribe    Notes to Pharmacy: Dose increase    Route: Oral    ursodiol (ACTIGALL) 250 MG tablet  60 tablet 11 2019    [address]  [address]    Sig: Take 1 tablet (250 mg) by mouth 2 times daily    Class: E-Prescribe    Route: Oral    valGANciclovir (VALCYTE) 450 MG tablet  69 tablet 0 1/7/2019 3/17/2019   [address]    Sig: Take 1 tablet (450 mg) by mouth daily    Class: E-Prescribe    Route: Oral    bumetanide (BUMEX) 1 MG tablet  2 tablet 0 1/8/2019 1/10/2019   [address]    Sig: Take 1 tablet (1 mg) by mouth daily for 2 days    Class: E-Prescribe    Route: Oral    glucose 40 % (400 mg/mL) GEL gel  30 g 3 1/7/2019    [address]    Sig: Take 15-30 g by mouth every 15 minutes as needed for low blood sugar    Class: E-Prescribe    Route: Oral    insulin pen needle (32G X 4 MM) 32G X 4 MM miscellaneous  100 each 3 1/7/2019 1/7/2020   [address]    Sig: Use once pen needles daily or as directed.    Class: E-Prescribe    traMADol (ULTRAM) 50 MG tablet  15 tablet 0 1/7/2019    [address]    Sig: Take 1 tablet (50 mg) by mouth every 6 hours as needed for moderate pain    Class: Local Print    Route: Oral        Patient has no known allergies.   REVIEW OF SYSTEMS (check box if normal)  [x]               GENERAL  [x]                 PULMONARY [x]                GENITOURINARY  [x]                CNS                 [x]                 CARDIAC  [x]                 ENDOCRINE  [x]                EARS,NOSE,THROAT [x]                 GASTROINTESTINAL [x]                 NEUROLOGIC    [x]                MUSCLOSKELTAL  [x]                  HEMATOLOGY      PHYSICAL EXAM (check box if normal)/74   Pulse 56   Temp 98.1  F (36.7  C) (Oral)   Resp 18   Ht 1.702 m (5' 7\")   Wt 76.7 kg (169 lb 3.2 oz)   BMI 26.50 kg/m           [x]            GENERAL:    [x]       " EYES:  ICTERIC   []        YES  []                    NO  [x]           EXTREMITIES: Clubbing []       Y     [x]           N    [x]           EARS, NOSE, THROAT: Membranes Moist    YES   [x]                   NO []                  [x]           LUNGS:  CLEAR    YES       [x]                  NO    []                                [x]           SKIN: Jaundice           YES       []                  NO    [x]                   Rash: YES       []                  NO    [x]                                     [x]             HEART: Regular Rate          YES       [x]                  NO    []                   Incision Clean:  YES       [x]                  NO    []                                [x]                    ABDOMEN: Wound healed well Organomegaly YES       []                  NO    [x]                       [x]                    NEUROLOGICAL:  Nonfocal  YES       [x]                  NO    []                       [x]                    Hernia YES       []                  NO    [x]                   PSYCHIATRIC:  Appropriate  YES       [x]                  NO    []                       OTHER:                                                                                                   PAIN SCALE:: 3        Emil Echevarria MD

## 2019-02-04 NOTE — PROGRESS NOTES
HPI      ROS      Physical Exam    Transplant Surgery -OUTPATIENT IMMUNOSUPPRESSION PROGRESS NOTE    Date of Visit: 2019    Transplants:  2018 (Liver); Postoperative day:  44  ASSESMENT AND PLAN:  1.Graft Function:Liver allograft: no rejection or technical problems.    2.Immunosuppression Management: keep tacrolimus levels at 8ng/dl  3.Hypertension: on norvasc 10 mg po daily  4.Renal Function: ok  5.Lab frequency: weekly and do labs today  6.Other:  Hernia wound healed well    Date: 2019    Transplant:  [x]                             Liver [x]                              Kidney []                             Pancreas []                              Other:             Chief Complaint:  Doing well, no fever or abdominal pain  History of Present Illness:  Post liver transplant    Patient Active Problem List   Diagnosis     Cirrhosis of liver (H)     Liver lesion     HCC (hepatocellular carcinoma) (H)     Liver transplant recipient (H)     Immunosuppressed status (H)     Hypervolemia     Atrial fibrillation with rapid ventricular response (H)     Hematuria     Bilateral wheezing     Hypoxia     Hyperglycemia     Pre-diabetes     Essential hypertension     Constipation     Biliary stricture of transplanted liver (H)     Drug-induced diabetes mellitus (H)     SOCIAL /FAMILY HISTORY: [x]                  No recent change    Past Medical History:   Diagnosis Date     Biliary stricture of transplanted liver (H) 2018     Cancer (H)     hepatocellualr carcinoma     Cirrhosis of liver (H) 2018     Enlarged prostate      Inguinal hernia     Repaired with mesh on 18     Liver lesion 2018     Liver transplanted (H) 2018     donor liver transplant     Portal vein thrombosis     on path explant     Postoperative atrial fibrillation (H) 2018     Pre-diabetes 2018     Past Surgical History:   Procedure Laterality Date     APPENDECTOMY       COLONOSCOPY            ENDOSCOPIC RETROGRADE CHOLANGIOPANCREATOGRAM N/A 2018    Procedure: ENDOSCOPIC RETROGRADE CHOLANGIOPANCREATOGRAM, with Billary Sphincterotomy and Billary Stent Placement;  Surgeon: Omero Lawler MD;  Location: [facility]     HERNIORRHAPHY INGUINAL  2018    Procedure: Herniorrhaphy inguinal;  Surgeon: Emil Echevarria MD;  Location: [facility] OR     IR LIVER BIOPSY PERCUTANEOUS  2018     TRANSPLANT LIVER RECIPIENT  DONOR N/A 2018    Procedure: TRANSPLANT LIVER RECIPIENT  DONOR;  Surgeon: Emil Echevarria MD;  Location: [facility] OR     Social History     Socioeconomic History     Marital status:      Spouse name: Not on file     Number of children: Not on file     Years of education: Not on file     Highest education level: Not on file   Social Needs     Financial resource strain: Not on file     Food insecurity - worry: Not on file     Food insecurity - inability: Not on file     Transportation needs - medical: Not on file     Transportation needs - non-medical: Not on file   Occupational History     Not on file   Tobacco Use     Smoking status: Former Smoker     Packs/day: 0.03     Years: 20.00     Pack years: 0.60     Types: Cigarettes, Cigars     Start date: 1970     Last attempt to quit: 3/14/2018     Years since quittin.8     Smokeless tobacco: Never Used     Tobacco comment: Quit smoking 2018, one cigarette after dinner   Substance and Sexual Activity     Alcohol use: No     Comment: Quit drinking 2018     Drug use: No     Sexual activity: Not on file   Other Topics Concern     Parent/sibling w/ CABG, MI or angioplasty before 65F 55M? Not Asked   Social History Narrative    Born in [place], came to [place] years ago.     Has worked in [employer]     Prescription Medications as of 2019       Rx Number Disp Refills Start End Last Dispensed Date Next Fill Date Owning Pharmacy    alfuzosin ER (UROXATRAL) 10 MG 24  hr tablet  90 tablet 3 1/24/2019    [place] [facility] [place] [id number] [address]    Sig: Take 1 tablet (10 mg) by mouth daily    Class: E-Prescribe    Route: Oral    amLODIPine (NORVASC) 10 MG tablet  30 tablet 11 1/7/2019    [address]    Sig: Take 1 tablet (10 mg) by mouth daily    Class: E-Prescribe    Route: Oral    aspirin (ASA) 325 MG EC tablet  30 tablet 5 1/7/2019    [address]    Sig: Take 1 tablet (325 mg) by mouth daily    Class: E-Prescribe    Route: Oral    blood glucose (NO BRAND SPECIFIED) lancets standard  200 each 11 1/7/2019 1/7/2020   [address]    Sig: Use to test blood sugar 4 times daily or as directed.    Class: E-Prescribe    blood glucose (ONETOUCH VERIO IQ) test strip  200 strip 11 1/7/2019 1/7/2020   [address]    Sig: Use to test blood sugar 4 times daily or as directed.    Class: E-Prescribe    Notes to Pharmacy: Onetouch Verio Flex strips    metoprolol tartrate 75 MG TABS  60 tablet 11 1/7/2019    [address]    Sig: Take 75 mg by mouth 2 times daily    Class: E-Prescribe    Route: Oral    multivitamin w/minerals (THERA-VIT-M) tablet  90 tablet 3 1/24/2019    [place] [facility] [place] [id number] [address]    Sig: Take 1 tablet by mouth daily    Class: E-Prescribe    Route: Oral    mycophenolate (GENERIC EQUIVALENT) 250 MG capsule  180 capsule 11 1/14/2019    [facility] [place] [id number] [address]    Sig: Take 3 capsules (750 mg) by mouth 2 times daily    Class: E-Prescribe    Route: Oral    omeprazole (PRILOSEC) 20 MG CR capsule    4/4/2018 4/4/2019       Sig: Take 20 mg by mouth daily     Class: Historical     Route: Oral    polyethylene glycol (MIRALAX/GLYCOLAX) powder  850 g 3 2019    [address]    Sig: Take 17 g (1 capful) by mouth 2 times daily Hold for loose stools    Class: E-Prescribe    Route: Oral    senna-docusate (SENOKOT-S/PERICOLACE) 8.6-50 MG tablet  120 tablet 0 2019    [place] [facility] [address]    Sig: Take 2 tablets by mouth 2 times daily Hold for loose stools    Class: E-Prescribe    Route: Oral    Sharps Container MISC  1 each 2 2019    [address]    Si each daily    Class: E-Prescribe    Route: Does not apply    simethicone (MYLICON) 80 MG chewable tablet  40 tablet 0 2019   [address]    Sig: Take 1 tablet (80 mg) by mouth every 6 hours as needed for flatulence or cramping    Class: E-Prescribe    Route: Oral    sulfamethoxazole-trimethoprim (BACTRIM/SEPTRA) 400-80 MG tablet  30 tablet 5 2019    [address]    Sig: Take 1 tablet by mouth daily    Class: E-Prescribe    Route: Oral    tacrolimus (GENERIC EQUIVALENT) 0.5 MG capsule  60 capsule 0 2019    [address]    Sig: Take 1 cap by mouth twice daily as directed by Transplant Center for dose adjustment    Class: E-Prescribe    tacrolimus (GENERIC EQUIVALENT) 1 MG capsule  480 capsule 3 2019    [facility]    Sig: Take 8 capsules (8 mg) by mouth 2 times daily    Class: E-Prescribe    Notes to Pharmacy: Dose increase    Route: Oral    ursodiol (ACTIGALL) 250 MG tablet  60 tablet 11 2019    [address]    Sig: Take 1 tablet (250 mg) by mouth 2 times daily  "   Class: E-Prescribe    Route: Oral    valGANciclovir (VALCYTE) 450 MG tablet  69 tablet 0 1/7/2019 3/17/2019   [address]    Sig: Take 1 tablet (450 mg) by mouth daily    Class: E-Prescribe    Route: Oral    bumetanide (BUMEX) 1 MG tablet  2 tablet 0 1/8/2019 1/10/2019   [address]    Sig: Take 1 tablet (1 mg) by mouth daily for 2 days    Class: E-Prescribe    Route: Oral    glucose 40 % (400 mg/mL) GEL gel  30 g 3 1/7/2019    [address]    Sig: Take 15-30 g by mouth every 15 minutes as needed for low blood sugar    Class: E-Prescribe    Route: Oral    insulin pen needle (32G X 4 MM) 32G X 4 MM miscellaneous  100 each 3 1/7/2019 1/7/2020   [address]    Sig: Use once pen needles daily or as directed.    Class: E-Prescribe    traMADol (ULTRAM) 50 MG tablet  15 tablet 0 1/7/2019    [address]    Sig: Take 1 tablet (50 mg) by mouth every 6 hours as needed for moderate pain    Class: Local Print    Route: Oral        Patient has no known allergies.   REVIEW OF SYSTEMS (check box if normal)  [x]               GENERAL  [x]                 PULMONARY [x]                GENITOURINARY  [x]                CNS                 [x]                 CARDIAC  [x]                 ENDOCRINE  [x]                EARS,NOSE,THROAT [x]                 GASTROINTESTINAL [x]                 NEUROLOGIC    [x]                MUSCLOSKELTAL  [x]                  HEMATOLOGY      PHYSICAL EXAM (check box if normal)/74   Pulse 56   Temp 98.1  F (36.7  C) (Oral)   Resp 18   Ht 1.702 m (5' 7\")   Wt 76.7 kg (169 lb 3.2 oz)   BMI 26.50 kg/m          [x]            GENERAL:    [x]       EYES:  ICTERIC   []        YES  []                    NO  " [x]           EXTREMITIES: Clubbing []       Y     [x]           N    [x]           EARS, NOSE, THROAT: Membranes Moist    YES   [x]                   NO []                  [x]           LUNGS:  CLEAR    YES       [x]                  NO    []                                [x]           SKIN: Jaundice           YES       []                  NO    [x]                   Rash: YES       []                  NO    [x]                                     [x]             HEART: Regular Rate          YES       [x]                  NO    []                   Incision Clean:  YES       [x]                  NO    []                                [x]                    ABDOMEN: Wound healed well Organomegaly YES       []                  NO    [x]                       [x]                    NEUROLOGICAL:  Nonfocal  YES       [x]                  NO    []                       [x]                    Hernia YES       []                  NO    [x]                   PSYCHIATRIC:  Appropriate  YES       [x]                  NO    []                       OTHER:                                                                                                   PAIN SCALE:: 3

## 2019-02-04 NOTE — LETTER
2/4/2019       RE: Loy Limon  [address]  [facility] [id number]     Dear Colleague,    Thank you for referring your patient, Loy Limon, to the [facility]ID ORGAN TRANSPLANT at [facility]. Please see a copy of my visit note below.    Transplant Surgery -OUTPATIENT IMMUNOSUPPRESSION PROGRESS NOTE    Date of Visit: 02/04/2019    Transplants:  12/22/2018 (Liver); Postoperative day:  44  ASSESMENT AND PLAN:  1.Graft Function:Liver allograft: no rejection or technical problems.    2.Immunosuppression Management: keep tacrolimus levels at 8ng/dl  3.Hypertension: on norvasc 10 mg po daily  4.Renal Function: ok  5.Lab frequency: weekly and do labs today  6.Other:  Hernia wound healed well    Date: February 4, 2019    Transplant:  [x]                             Liver [x]                              Kidney []                             Pancreas []                              Other:             Chief Complaint:  Doing well, no fever or abdominal pain  History of Present Illness:  Post liver transplant    Patient Active Problem List   Diagnosis     Cirrhosis of liver (H)     Liver lesion     HCC (hepatocellular carcinoma) (H)     Liver transplant recipient (H)     Immunosuppressed status (H)     Hypervolemia     Atrial fibrillation with rapid ventricular response (H)     Hematuria     Bilateral wheezing     Hypoxia     Hyperglycemia     Pre-diabetes     Essential hypertension     Constipation     Biliary stricture of transplanted liver (H)     Drug-induced diabetes mellitus (H)     SOCIAL /FAMILY HISTORY: [x]                  No recent change    Past Medical History:   Diagnosis Date     Biliary stricture of transplanted liver (H) 12/28/2018     Cancer (H)     hepatocellualr carcinoma     Cirrhosis of liver (H) 5/8/2018     Enlarged prostate      Inguinal hernia     Repaired with mesh on 12/22/18     Liver lesion 5/8/2018     Liver transplanted (H)  2018     donor liver transplant     Portal vein thrombosis     on path explant     Postoperative atrial fibrillation (H) 2018     Pre-diabetes 2018     Past Surgical History:   Procedure Laterality Date     APPENDECTOMY       COLONOSCOPY           ENDOSCOPIC RETROGRADE CHOLANGIOPANCREATOGRAM N/A 2018    Procedure: ENDOSCOPIC RETROGRADE CHOLANGIOPANCREATOGRAM, with Billary Sphincterotomy and Billary Stent Placement;  Surgeon: Omero Lawler MD;  Location: [facility] OR     HERNIORRHAPHY INGUINAL  2018    Procedure: Herniorrhaphy inguinal;  Surgeon: Emil Echevarria MD;  Location: [facility] OR     IR LIVER BIOPSY PERCUTANEOUS  2018     TRANSPLANT LIVER RECIPIENT  DONOR N/A 2018    Procedure: TRANSPLANT LIVER RECIPIENT  DONOR;  Surgeon: Emil Echevarria MD;  Location: [facility] OR     Social History     Socioeconomic History     Marital status:      Spouse name: Not on file     Number of children: Not on file     Years of education: Not on file     Highest education level: Not on file   Social Needs     Financial resource strain: Not on file     Food insecurity - worry: Not on file     Food insecurity - inability: Not on file     Transportation needs - medical: Not on file     Transportation needs - non-medical: Not on file   Occupational History     Not on file   Tobacco Use     Smoking status: Former Smoker     Packs/day: 0.03     Years: 20.00     Pack years: 0.60     Types: Cigarettes, Cigars     Start date: 1970     Last attempt to quit: 3/14/2018     Years since quittin.8     Smokeless tobacco: Never Used     Tobacco comment: Quit smoking 2018, one cigarette after dinner   Substance and Sexual Activity     Alcohol use: No     Comment: Quit drinking 2018     Drug use: No     Sexual activity: Not on file   Other Topics Concern     Parent/sibling w/ CABG, MI or angioplasty before 65F 55M? Not Asked   Social History Narrative     Born in [place], came to  30 years ago.     Has worked in [employer]     Prescription Medications as of 2/4/2019       Rx Number Disp Refills Start End Last Dispensed Date Next Fill Date Owning Pharmacy    alfuzosin ER (UROXATRAL) 10 MG 24 hr tablet  90 tablet 3 1/24/2019    [place] [facility] [place] [id number] [address]    Sig: Take 1 tablet (10 mg) by mouth daily    Class: E-Prescribe    Route: Oral    amLODIPine (NORVASC) 10 MG tablet  30 tablet 11 1/7/2019    [address]    Sig: Take 1 tablet (10 mg) by mouth daily    Class: E-Prescribe    Route: Oral    aspirin (ASA) 325 MG EC tablet  30 tablet 5 1/7/2019    [address]    Sig: Take 1 tablet (325 mg) by mouth daily    Class: E-Prescribe    Route: Oral    blood glucose (NO BRAND SPECIFIED) lancets standard  200 each 11 1/7/2019 1/7/2020   [address]    Sig: Use to test blood sugar 4 times daily or as directed.    Class: E-Prescribe    blood glucose (ONETOUCH VERIO IQ) test strip  200 strip 11 1/7/2019 1/7/2020   [address]    Sig: Use to test blood sugar 4 times daily or as directed.    Class: E-Prescribe    Notes to Pharmacy: Onetouch Verio Flex strips    metoprolol tartrate 75 MG TABS  60 tablet 11 1/7/2019    [address]    Sig: Take 75 mg by mouth 2 times daily    Class: E-Prescribe    Route: Oral    multivitamin w/minerals (THERA-VIT-M) tablet  90 tablet 3 1/24/2019    [place] [facility] [place] [id number] [address]    Sig: Take 1 tablet by mouth daily    Class: E-Prescribe    Route: Oral    mycophenolate (GENERIC EQUIVALENT) 250 MG capsule  180 capsule 11 1/14/2019    [place]  [facility] [address]    Sig: Take 3 capsules (750 mg) by mouth 2 times daily    Class: E-Prescribe    Route: Oral    omeprazole (PRILOSEC) 20 MG CR capsule    2018       Sig: Take 20 mg by mouth daily     Class: Historical    Route: Oral    polyethylene glycol (MIRALAX/GLYCOLAX) powder  850 g 3 2019    [address]    Sig: Take 17 g (1 capful) by mouth 2 times daily Hold for loose stools    Class: E-Prescribe    Route: Oral    senna-docusate (SENOKOT-S/PERICOLACE) 8.6-50 MG tablet  120 tablet 0 2019    [facility]/[address]    Sig: Take 2 tablets by mouth 2 times daily Hold for loose stools    Class: E-Prescribe    Route: Oral    Sharps Container MISC  1 each 2 2019    [address]    Si each daily    Class: E-Prescribe    Route: Does not apply    simethicone (MYLICON) 80 MG chewable tablet  40 tablet 0 2019   [address]    Sig: Take 1 tablet (80 mg) by mouth every 6 hours as needed for flatulence or cramping    Class: E-Prescribe    Route: Oral    sulfamethoxazole-trimethoprim (BACTRIM/SEPTRA) 400-80 MG tablet  30 tablet 5 2019    [address]    Sig: Take 1 tablet by mouth daily    Class: E-Prescribe    Route: Oral    tacrolimus (GENERIC EQUIVALENT) 0.5 MG capsule  60 capsule 0 2019    [address]    Sig: Take 1 cap by mouth twice daily as directed by Transplant Center for dose adjustment    Class: E-Prescribe    tacrolimus (GENERIC EQUIVALENT) 1 MG capsule  480 capsule 3 2019    [facility]/[address]    Sig: Take 8 capsules (8 mg) by  mouth 2 times daily    Class: E-Prescribe    Notes to Pharmacy: Dose increase    Route: Oral    ursodiol (ACTIGALL) 250 MG tablet  60 tablet 11 1/7/2019    [address]    Sig: Take 1 tablet (250 mg) by mouth 2 times daily    Class: E-Prescribe    Route: Oral    valGANciclovir (VALCYTE) 450 MG tablet  69 tablet 0 1/7/2019 3/17/2019   [address]    Sig: Take 1 tablet (450 mg) by mouth daily    Class: E-Prescribe    Route: Oral    bumetanide (BUMEX) 1 MG tablet  2 tablet 0 1/8/2019 1/10/2019   [address]    Sig: Take 1 tablet (1 mg) by mouth daily for 2 days    Class: E-Prescribe    Route: Oral    glucose 40 % (400 mg/mL) GEL gel  30 g 3 1/7/2019    [address]    Sig: Take 15-30 g by mouth every 15 minutes as needed for low blood sugar    Class: E-Prescribe    Route: Oral    insulin pen needle (32G X 4 MM) 32G X 4 MM miscellaneous  100 each 3 1/7/2019 1/7/2020   [address]    Sig: Use once pen needles daily or as directed.    Class: E-Prescribe    traMADol (ULTRAM) 50 MG tablet  15 tablet 0 1/7/2019    [address]    Sig: Take 1 tablet (50 mg) by mouth every 6 hours as needed for moderate pain    Class: Local Print    Route: Oral        Patient has no known allergies.   REVIEW OF SYSTEMS (check box if normal)  [x]               GENERAL  [x]                 PULMONARY [x]                GENITOURINARY  [x]                CNS                 [x]                 CARDIAC  [x]                 ENDOCRINE  [x]                EARS,NOSE,THROAT [x]                 GASTROINTESTINAL [x]                 NEUROLOGIC    [x]                MUSCLOSKELTAL  [x]                   "HEMATOLOGY      PHYSICAL EXAM (check box if normal)/74   Pulse 56   Temp 98.1  F (36.7  C) (Oral)   Resp 18   Ht 1.702 m (5' 7\")   Wt 76.7 kg (169 lb 3.2 oz)   BMI 26.50 kg/m           [x]            GENERAL:    [x]       EYES:  ICTERIC   []        YES  []                    NO  [x]           EXTREMITIES: Clubbing []       Y     [x]           N    [x]           EARS, NOSE, THROAT: Membranes Moist    YES   [x]                   NO []                  [x]           LUNGS:  CLEAR    YES       [x]                  NO    []                                [x]           SKIN: Jaundice           YES       []                  NO    [x]                   Rash: YES       []                  NO    [x]                                     [x]             HEART: Regular Rate          YES       [x]                  NO    []                   Incision Clean:  YES       [x]                  NO    []                                [x]                    ABDOMEN: Wound healed well Organomegaly YES       []                  NO    [x]                       [x]                    NEUROLOGICAL:  Nonfocal  YES       [x]                  NO    []                       [x]                    Hernia YES       []                  NO    [x]                   PSYCHIATRIC:  Appropriate  YES       [x]                  NO    []                       OTHER:                                                                                                   PAIN SCALE:: 3  Sincerely,    Emil Echevarria MD      "

## 2019-02-04 NOTE — NURSING NOTE
"Chief Complaint   Patient presents with     Surgical Followup     post liver     Blood pressure 160/74, pulse 56, temperature 98.1  F (36.7  C), temperature source Oral, resp. rate 18, height 1.702 m (5' 7\"), weight 76.7 kg (169 lb 3.2 oz).    SALAS CAMPA CMA    "

## 2019-02-04 NOTE — NURSING NOTE
Here for post liver transplant follow-up.  Is now 44 days post liver transplant / hernia repair.  Incision is healed.  He reports very good appetite, has gain 1.5 pounds.  Denies nausea, having regular bowel movements.  Is managing diabetes w/ PCP and endocrinologist.  He is aware that he needs to see his PCP for BP management.    Does report what sounds like neurpathy (burning / pain / discomfort in both hands / fingers.  We did explain that this could be caused by prograf, asked him to let us know if it becomes intolerable.  He says it is tolerable, just notices it more when he touches something warm / hot.  Homecare is still following w/ twice weekly labs.  He will return in 2 weeks to see Dr. carter - if labs remain normal at that time we will decrease to weekly labs.  Last ERCP was 12/28 (1 stent placed) - per note needs repeat 8 weeks.      Reviewed recent labs and assisted with interpretation. Assured Johan that he is doing well.    Complaints / Concerns:  None other than finger pain.    Current immunosuppression:    Prograf and cellcept    Med changes: none.  Updated patient's med card, added stop dates for bactrim (6/22) and valcyte (March 22)..    Lab frequency:  Twice weekly    Follow-up:  Transplant surgery in 2 weeks,  derm at 1 year and PCP regularly for BP and glucose management.  Reviewed my contact information, now to reach the transplant office during weekday and afterhours.

## 2019-02-05 ENCOUNTER — OFFICE VISIT (OUTPATIENT)
Dept: INFECTIOUS DISEASES | Facility: CLINIC | Age: 64
End: 2019-02-05
Attending: STUDENT IN AN ORGANIZED HEALTH CARE EDUCATION/TRAINING PROGRAM
Payer: MEDICARE

## 2019-02-05 ENCOUNTER — TELEPHONE (OUTPATIENT)
Dept: TRANSPLANT | Facility: CLINIC | Age: 64
End: 2019-02-05

## 2019-02-05 VITALS
DIASTOLIC BLOOD PRESSURE: 56 MMHG | BODY MASS INDEX: 26.51 KG/M2 | OXYGEN SATURATION: 97 % | WEIGHT: 168.9 LBS | TEMPERATURE: 97.7 F | HEART RATE: 51 BPM | SYSTOLIC BLOOD PRESSURE: 119 MMHG | HEIGHT: 67 IN

## 2019-02-05 DIAGNOSIS — Z22.7 LATENT TUBERCULOSIS INFECTION: Primary | ICD-10-CM

## 2019-02-05 DIAGNOSIS — Z94.4 LIVER TRANSPLANT RECIPIENT (H): ICD-10-CM

## 2019-02-05 DIAGNOSIS — Z79.2 NEED FOR PROPHYLACTIC ANTIBIOTIC: ICD-10-CM

## 2019-02-05 PROCEDURE — G0463 HOSPITAL OUTPT CLINIC VISIT: HCPCS | Mod: ZF

## 2019-02-05 PROCEDURE — T1013 SIGN LANG/ORAL INTERPRETER: HCPCS | Mod: U3,ZF

## 2019-02-05 RX ORDER — VALGANCICLOVIR 450 MG/1
900 TABLET, FILM COATED ORAL DAILY
Qty: 69 TABLET | Refills: 0 | Status: SHIPPED | OUTPATIENT
Start: 2019-02-05 | End: 2019-06-26

## 2019-02-05 ASSESSMENT — MIFFLIN-ST. JEOR: SCORE: 1509.76

## 2019-02-05 ASSESSMENT — PAIN SCALES - GENERAL: PAINLEVEL: NO PAIN (0)

## 2019-02-05 NOTE — LETTER
2/5/2019      RE: Loy Limon  [address]  [facility] [address]         [facility]  Transplant Infectious Disease Consultation note  Today's Date: 2/5/19    Recommendations:  - patient to bring remaining rifampin meds at home for pill counting  - left a voice message with daughter regarding the same  - he is on valcyte 450 mg daily despite normal creatinine. Increase dose to 900 mg daily  - contacted transplant coordinator to help with pill box and nurse home visit issue. She was able to come and was dealing with the situation.    Thank you for involving me in the care of this patient. Please do not hesitate to contact me with any questions.    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 S/p liver transplant 12/22.     Latent TB: with a positive quant and being from [place] where TB incidence is high and in the absence of symptoms or signs of active TB, would consider him to have latent TB. Recommend rifampin for 4 months. No major drug interactions but due to liver cirrhosis, will follow LFTS closely. Discussed symptoms of liver toxicity and side effects of rifampin and answered all questions about the concept of latent Tb that patient had a little difficulty in understanding.    We started treatment 8/21. He is supposed to have been done 12/20 but had 1.5 months worth of meds remaining on 12/4. I asked him to drop off the remaining rifampin pills. He asked me to talk to his daughter about this. I left her a voice message.       - Serostatus: CMV R+, EBV R+  - Immunization status: up to date   - Prophylaxis: valcyte 450 mg daily       Attestation: I have reviewed today's vital signs, medications, labs and imaging. Face to face time 40 mins. >50% spent coordinating care and counseling on my impressions and plan going forward.     Pauline  Julieth  , [facility]  Pager - [phone]    -------------------------------------------------------------------------------------------------------------------   Interval events: Last seen 12/4  He got a liver transplant 12/22. Post op course c/b elevated alk phos - biliary anastomotic narrowing s/p sphincterotomy and stent placement. Also received steroids for possible acute rejection 12/30-1/1, biopsy not suggestive of it. He also had afib with RVR. His alk phos has down trended but still mildly elevated at 300.     Patient reports he was taking rifampin , latent TB treatment prior to hospital admission for transplant. Hospital notes state that he has completed latent TB treatment 8/21 to 12/20. However based on my last note 12/4 he had 1.5 months worth of meds remaining at that time. So if he took rifampin for 2 weeks after that then he would still be short of 1 month of rifampin.     Today he reports pain in the abdominal incisional area , otherwise he feels fine. He reports that the nurse who is supposed to come home and arrage his pills in the pill box was due to come yesterday or today and has not turned up. He cannot read the meds and is very reliant on this nurse to distribute the meds in the pill box. He think she is out of meds. He has brought all the medication bottles with him today.     On review of the pill bottles, he has enough of all the meds (including the recent mail delivery) for 5-7 days atleast. We contacted patients transplant coordinator who is helping sort this problem out.     ---------------------------------------------------------------------------    Reason for consult / Chief complaint: 8/21/18  Consulted by Dr. Carballo for latent TB    History of presenting illness: Patient presents with   Loy Limon is a 63 year old with medical history significant for cirrhosis of liver from alcohol use, HCC being evaluated for liver  transplantation. As part of that work up, quantiferon was done and found to be positive at a value of around 1 and was thus referred to me.     He was born and raised in [place]. Came to the [place] around 35 years ago, goes back to [place] every year. He does not remember being exposed to anybody with TB. He does not recall having PPD or quant before. He reports, his wifes brother had tuberculosis, but he not closely associated with him. He denies symptoms of active TB. Ct chest done 2018 does not show evidence of active pulmn TB.     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     Social Hx:  Social History     Tobacco Use     Smoking status: Former Smoker     Packs/day: 0.03     Years: 20.00     Pack years: 0.60     Types: Cigarettes, Cigars     Start date: 1970     Last attempt to quit: 3/14/2018     Years since quittin.9     Smokeless tobacco: Never Used     Tobacco comment: Quit smoking 2018, one cigarette after dinner   Substance Use Topics     Alcohol use: No     Comment: Quit drinking 2018     Drug use: No       Immunizations:  Immunization History   Administered Date(s) Administered     Influenza (High Dose) 3 valent vaccine 10/24/2018     Pneumo Conj 13-V (2010&after) 2018     Pneumococcal 23 valent 2018     TDAP Vaccine (Boostrix) 2018     Zoster vaccine recombinant adjuvanted (SHINGRIX) 2018, 10/24/2018       Allergies:   No Known Allergies    Medications:  Current Outpatient Medications   Medication     alfuzosin ER (UROXATRAL) 10 MG 24 hr tablet     amLODIPine (NORVASC) 10 MG tablet     aspirin (ASA) 325 MG EC tablet     blood glucose (NO BRAND SPECIFIED) lancets standard     blood glucose (ONETOUCH VERIO IQ) test strip     metoprolol tartrate 75 MG TABS     multivitamin w/minerals (THERA-VIT-M) tablet     mycophenolate (GENERIC EQUIVALENT) 250 MG capsule     omeprazole (PRILOSEC) 20 MG  "CR capsule     polyethylene glycol (MIRALAX/GLYCOLAX) powder     senna-docusate (SENOKOT-S/PERICOLACE) 8.6-50 MG tablet     Sharps Container MISC     sulfamethoxazole-trimethoprim (BACTRIM/SEPTRA) 400-80 MG tablet     tacrolimus (GENERIC EQUIVALENT) 0.5 MG capsule     tacrolimus (GENERIC EQUIVALENT) 1 MG capsule     ursodiol (ACTIGALL) 250 MG tablet     valGANciclovir (VALCYTE) 450 MG tablet     bumetanide (BUMEX) 1 MG tablet     glucose 40 % (400 mg/mL) GEL gel     insulin pen needle (32G X 4 MM) 32G X 4 MM miscellaneous     traMADol (ULTRAM) 50 MG tablet     No current facility-administered medications for this visit.        Past Medical Hx:  Past Medical History:   Diagnosis Date     Biliary stricture of transplanted liver (H) 2018     Cancer (H)     hepatocellualr carcinoma     Cirrhosis of liver (H) 2018     Enlarged prostate      Inguinal hernia     Repaired with mesh on 18     Liver lesion 2018     Liver transplanted (H) 2018     donor liver transplant     Portal vein thrombosis     on path explant     Postoperative atrial fibrillation (H) 2018     Pre-diabetes 2018         Family History:  Family History   Problem Relation Age of Onset     Liver Disease Brother          Review of Systems:  10 systems reviewed, pertinent positives noted in my HPI.      Examination:  Vital signs:   /56   Pulse 51   Temp 97.7  F (36.5  C) (Oral)   Ht 1.702 m (5' 7\")   Wt 76.6 kg (168 lb 14.4 oz)   SpO2 97%   BMI 26.45 kg/m       Constitutional: Patient in no distress  Eyes: not pale, mildly jaundiced  Neck: no lymphadenopathy  CVS: no added sounds  RS: clear  Abdomen: soft, BS+, gertrude Papa incision present and healing well  Skin: no rash  Extremities: bilateral pitting pedal edema  Psych: Alert and oriented x 3    Laboratory:  Hematology:  Recent Labs   Lab Test 19  1024 19  0815 19  0810   WBC 3.8* 4.1 3.9*   RBC 3.22* 3.32* 3.30*   HGB 9.6* 10.2* " 9.9*   HCT 29.9* 30.0* 29.8*   MCV 93 90 90   MCH 29.8 30.7 30.0   MCHC 32.1 34.0 33.2   RDW 13.5 13.5 13.7    234 251       Chemistry:  Recent Labs   Lab Test 19  1024 19  0815 19  0810    140 139   POTASSIUM 5.7* 5.1 5.1   CHLORIDE 112* 111* 111*   CO2 22 22 23   ANIONGAP 4 7 5   * 149* 148*   BUN 16 23 21   CR 1.00 0.81 0.83   YELENA 8.5 8.8 8.5       Liver Function Studies:     Recent Labs   Lab Test 19  1024 19  0815 19  0810   PROTTOTAL 6.8 7.1 6.9   ALBUMIN 3.3* 3.6 3.3*   BILITOTAL 0.6 0.6 0.6   ALKPHOS 306* 348* 359*   AST 9 15 11   ALT 20 22 18       Microbiology:  18   Quant positive     Strongy, toxoplasma and cysticercus Ab negative     [email or web address]   CT chest   Multifocal area of groundglass opacities of the bilateral  lower lobes, to lesser extent left upper lobes. Differentials  including focal atelectasis versus infection. Short-term follow-up to  make sure resolution is recommended.   1.  4 mm part solid pulmonary nodule within the right lower lobe  posterior medially, attention on follow-up is recommended.  3. Right hepatic lobe hypoattenuating lesion measures 3.8 x 3.0 cm.  Hypoattenuating lesion in the posterior right hepatic lobe measures  1.9 cm. Limited evaluation due to single phase study. Please refer to  same-day MRI study for further characterization.  4. Gallbladder wall thickening. Consider ultrasound.       Pauline Clancy MD

## 2019-02-05 NOTE — PROGRESS NOTES
[facility]  Transplant Infectious Disease Consultation note  Today's Date: 2/5/19    Addendum 2/8/19: Patient brought in his rifampin meds. He has 27 days worth of pills left, that was refilled on 12/10. Due to drug interaction with transplant meds, would do isoniazid 300 mg daily and pyridoxine 50 mg daily for 2 months to complete the latent TB course. Weekly LFTs. I think he is getting labs via transplant team anyway. Follow up in 1-2 months.     Recommendations:  - patient to bring remaining rifampin meds at home for pill counting  - left a voice message with daughter regarding the same  - he is on valcyte 450 mg daily despite normal creatinine. Increase dose to 900 mg daily  - contacted transplant coordinator to help with pill box and nurse home visit issue. She was able to come and was dealing with the situation.    Thank you for involving me in the care of this patient. Please do not hesitate to contact me with any questions.    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 S/p liver transplant 12/22.     Latent TB: with a positive quant and being from [place] where TB incidence is high and in the absence of symptoms or signs of active TB, would consider him to have latent TB. Recommend rifampin for 4 months. No major drug interactions but due to liver cirrhosis, will follow LFTS closely. Discussed symptoms of liver toxicity and side effects of rifampin and answered all questions about the concept of latent Tb that patient had a little difficulty in understanding.    We started treatment 8/21. He is supposed to have been done 12/20 but had 1.5 months worth of meds remaining on 12/4. I asked him to drop off the remaining rifampin pills. He asked me to talk to his daughter about this. I left her a voice message.       - Serostatus: CMV R+, EBV R+  - Immunization status: up to  date   - Prophylaxis: valcyte 450 mg daily       Attestation: I have reviewed today's vital signs, medications, labs and imaging. Face to face time 40 mins. >50% spent coordinating care and counseling on my impressions and plan going forward.     Pauline Clancy  , [facility]  Pager - [phone]    -------------------------------------------------------------------------------------------------------------------   Interval events: Last seen 12/4  He got a liver transplant 12/22. Post op course c/b elevated alk phos - biliary anastomotic narrowing s/p sphincterotomy and stent placement. Also received steroids for possible acute rejection 12/30-1/1, biopsy not suggestive of it. He also had afib with RVR. His alk phos has down trended but still mildly elevated at 300.     Patient reports he was taking rifampin , latent TB treatment prior to hospital admission for transplant. Hospital notes state that he has completed latent TB treatment 8/21 to 12/20. However based on my last note 12/4 he had 1.5 months worth of meds remaining at that time. So if he took rifampin for 2 weeks after that then he would still be short of 1 month of rifampin.     Today he reports pain in the abdominal incisional area , otherwise he feels fine. He reports that the nurse who is supposed to come home and arrage his pills in the pill box was due to come yesterday or today and has not turned up. He cannot read the meds and is very reliant on this nurse to distribute the meds in the pill box. He think she is out of meds. He has brought all the medication bottles with him today.     On review of the pill bottles, he has enough of all the meds (including the recent mail delivery) for 5-7 days atleast. We contacted patients transplant coordinator who is helping sort this problem out.     ---------------------------------------------------------------------------    Reason for consult / Chief complaint:  18  Consulted by Dr. Carballo for latent TB    History of presenting illness: Patient presents with   Loy Limon is a 63 year old with medical history significant for cirrhosis of liver from alcohol use, HCC being evaluated for liver transplantation. As part of that work up, quantiferon was done and found to be positive at a value of around 1 and was thus referred to me.     He was born and raised in [place]. Came to the [place] around 35 years ago, goes back to [place] every year. He does not remember being exposed to anybody with TB. He does not recall having PPD or quant before. He reports, his wifes brother had tuberculosis, but he not closely associated with him. He denies symptoms of active TB. Ct chest done 2018 does not show evidence of active pulmn TB.     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     Social Hx:  Social History     Tobacco Use     Smoking status: Former Smoker     Packs/day: 0.03     Years: 20.00     Pack years: 0.60     Types: Cigarettes, Cigars     Start date: 1970     Last attempt to quit: 3/14/2018     Years since quittin.9     Smokeless tobacco: Never Used     Tobacco comment: Quit smoking 2018, one cigarette after dinner   Substance Use Topics     Alcohol use: No     Comment: Quit drinking 2018     Drug use: No       Immunizations:  Immunization History   Administered Date(s) Administered     Influenza (High Dose) 3 valent vaccine 10/24/2018     Pneumo Conj 13-V (2010&after) 2018     Pneumococcal 23 valent 2018     TDAP Vaccine (Boostrix) 2018     Zoster vaccine recombinant adjuvanted (SHINGRIX) 2018, 10/24/2018       Allergies:   No Known Allergies    Medications:  Current Outpatient Medications   Medication     alfuzosin ER (UROXATRAL) 10 MG 24 hr tablet     amLODIPine (NORVASC) 10 MG tablet     aspirin (ASA) 325 MG EC tablet     blood glucose (NO  "BRAND SPECIFIED) lancets standard     blood glucose (ONETOUCH VERIO IQ) test strip     metoprolol tartrate 75 MG TABS     multivitamin w/minerals (THERA-VIT-M) tablet     mycophenolate (GENERIC EQUIVALENT) 250 MG capsule     omeprazole (PRILOSEC) 20 MG CR capsule     polyethylene glycol (MIRALAX/GLYCOLAX) powder     senna-docusate (SENOKOT-S/PERICOLACE) 8.6-50 MG tablet     Sharps Container MISC     sulfamethoxazole-trimethoprim (BACTRIM/SEPTRA) 400-80 MG tablet     tacrolimus (GENERIC EQUIVALENT) 0.5 MG capsule     tacrolimus (GENERIC EQUIVALENT) 1 MG capsule     ursodiol (ACTIGALL) 250 MG tablet     valGANciclovir (VALCYTE) 450 MG tablet     bumetanide (BUMEX) 1 MG tablet     glucose 40 % (400 mg/mL) GEL gel     insulin pen needle (32G X 4 MM) 32G X 4 MM miscellaneous     traMADol (ULTRAM) 50 MG tablet     No current facility-administered medications for this visit.        Past Medical Hx:  Past Medical History:   Diagnosis Date     Biliary stricture of transplanted liver (H) 2018     Cancer (H)     hepatocellualr carcinoma     Cirrhosis of liver (H) 2018     Enlarged prostate      Inguinal hernia     Repaired with mesh on 18     Liver lesion 2018     Liver transplanted (H) 2018     donor liver transplant     Portal vein thrombosis     on path explant     Postoperative atrial fibrillation (H) 2018     Pre-diabetes 2018         Family History:  Family History   Problem Relation Age of Onset     Liver Disease Brother          Review of Systems:  10 systems reviewed, pertinent positives noted in my HPI.      Examination:  Vital signs:   /56   Pulse 51   Temp 97.7  F (36.5  C) (Oral)   Ht 1.702 m (5' 7\")   Wt 76.6 kg (168 lb 14.4 oz)   SpO2 97%   BMI 26.45 kg/m      Constitutional: Patient in no distress  Eyes: not pale, mildly jaundiced  Neck: no lymphadenopathy  CVS: no added sounds  RS: clear  Abdomen: soft, BS+, gertrude Papa incision present and healing " well  Skin: no rash  Extremities: bilateral pitting pedal edema  Psych: Alert and oriented x 3    Laboratory:  Hematology:  Recent Labs   Lab Test 19  1024 19  0815 19  0810   WBC 3.8* 4.1 3.9*   RBC 3.22* 3.32* 3.30*   HGB 9.6* 10.2* 9.9*   HCT 29.9* 30.0* 29.8*   MCV 93 90 90   MCH 29.8 30.7 30.0   MCHC 32.1 34.0 33.2   RDW 13.5 13.5 13.7    234 251       Chemistry:  Recent Labs   Lab Test 19  1024 19  0815 19  0810    140 139   POTASSIUM 5.7* 5.1 5.1   CHLORIDE 112* 111* 111*   CO2 22 22 23   ANIONGAP 4 7 5   * 149* 148*   BUN 16 23 21   CR 1.00 0.81 0.83   YELENA 8.5 8.8 8.5       Liver Function Studies:     Recent Labs   Lab Test 19  1024 19  0815 19  0810   PROTTOTAL 6.8 7.1 6.9   ALBUMIN 3.3* 3.6 3.3*   BILITOTAL 0.6 0.6 0.6   ALKPHOS 306* 348* 359*   AST 9 15 11   ALT 20 22 18       Microbiology:  18   Quant positive     Strongy, toxoplasma and cysticercus Ab negative     [email or web address]   CT chest   Multifocal area of groundglass opacities of the bilateral  lower lobes, to lesser extent left upper lobes. Differentials  including focal atelectasis versus infection. Short-term follow-up to  make sure resolution is recommended.   1.  4 mm part solid pulmonary nodule within the right lower lobe  posterior medially, attention on follow-up is recommended.  3. Right hepatic lobe hypoattenuating lesion measures 3.8 x 3.0 cm.  Hypoattenuating lesion in the posterior right hepatic lobe measures  1.9 cm. Limited evaluation due to single phase study. Please refer to  same-day MRI study for further characterization.  4. Gallbladder wall thickening. Consider ultrasound.

## 2019-02-05 NOTE — TELEPHONE ENCOUNTER
Call from Lina in clinic stating that Mr. Limon says that he doesn't have meds set up after today.  I called homecare, they are planning on making a visit tomorrow and will help him set up meds.  I spoke to the  who relayed the information to Claribel.

## 2019-02-05 NOTE — LETTER
2/5/2019       RE: Loy Limon  [address]  [facility] [address]     Dear Colleague,    Thank you for referring your patient, Loy Limon, to the [facility] AND INFECTIOUS DISEASES at General acute hospital. Please see a copy of my visit note below.      [facility]  Transplant Infectious Disease Consultation note  Today's Date: 2/5/19    Recommendations:  - patient to bring remaining rifampin meds at home for pill counting  - left a voice message with daughter regarding the same  - he is on valcyte 450 mg daily despite normal creatinine. Increase dose to 900 mg daily  - contacted transplant coordinator to help with pill box and nurse home visit issue. She was able to come and was dealing with the situation.    Thank you for involving me in the care of this patient. Please do not hesitate to contact me with any questions.    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 S/p liver transplant 12/22.     Latent TB: with a positive quant and being from [place] where TB incidence is high and in the absence of symptoms or signs of active TB, would consider him to have latent TB. Recommend rifampin for 4 months. No major drug interactions but due to liver cirrhosis, will follow LFTS closely. Discussed symptoms of liver toxicity and side effects of rifampin and answered all questions about the concept of latent Tb that patient had a little difficulty in understanding.    We started treatment 8/21. He is supposed to have been done 12/20 but had 1.5 months worth of meds remaining on 12/4. I asked him to drop off the remaining rifampin pills. He asked me to talk to his daughter about this. I left her a voice message.       - Serostatus: CMV R+, EBV R+  - Immunization status: up to date   - Prophylaxis: valcyte 450 mg daily        Attestation: I have reviewed today's vital signs, medications, labs and imaging. Face to face time 40 mins. >50% spent coordinating care and counseling on my impressions and plan going forward.     Pauline Clancy  , [facility]  Pager - [phone]    -------------------------------------------------------------------------------------------------------------------   Interval events: Last seen 12/4  He got a liver transplant 12/22. Post op course c/b elevated alk phos - biliary anastomotic narrowing s/p sphincterotomy and stent placement. Also received steroids for possible acute rejection 12/30-1/1, biopsy not suggestive of it. He also had afib with RVR. His alk phos has down trended but still mildly elevated at 300.     Patient reports he was taking rifampin , latent TB treatment prior to hospital admission for transplant. Hospital notes state that he has completed latent TB treatment 8/21 to 12/20. However based on my last note 12/4 he had 1.5 months worth of meds remaining at that time. So if he took rifampin for 2 weeks after that then he would still be short of 1 month of rifampin.     Today he reports pain in the abdominal incisional area , otherwise he feels fine. He reports that the nurse who is supposed to come home and arrage his pills in the pill box was due to come yesterday or today and has not turned up. He cannot read the meds and is very reliant on this nurse to distribute the meds in the pill box. He think she is out of meds. He has brought all the medication bottles with him today.     On review of the pill bottles, he has enough of all the meds (including the recent mail delivery) for 5-7 days atleast. We contacted patients transplant coordinator who is helping sort this problem out.     ---------------------------------------------------------------------------    Reason for consult / Chief complaint: 8/21/18  Consulted by Dr. 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 As part of that work up, quantiferon was done and found to be positive at a value of around 1 and was thus referred to me.     He was born and raised in [place]. Came to the [place] around 35 years ago, goes back to [place] every year. He does not remember being exposed to anybody with TB. He does not recall having PPD or quant before. He reports, his wifes brother had tuberculosis, but he not closely associated with him. He denies symptoms of active TB. Ct chest done 2018 does not show evidence of active pulmn TB.     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     Social Hx:  Social History     Tobacco Use     Smoking status: Former Smoker     Packs/day: 0.03     Years: 20.00     Pack years: 0.60     Types: Cigarettes, Cigars     Start date: 1970     Last attempt to quit: 3/14/2018     Years since quittin.9     Smokeless tobacco: Never Used     Tobacco comment: Quit smoking 2018, one cigarette after dinner   Substance Use Topics     Alcohol use: No     Comment: Quit drinking 2018     Drug use: No       Immunizations:  Immunization History   Administered Date(s) Administered     Influenza (High Dose) 3 valent vaccine 10/24/2018     Pneumo Conj 13-V (2010&after) 2018     Pneumococcal 23 valent 2018     TDAP Vaccine (Boostrix) 2018     Zoster vaccine recombinant adjuvanted (SHINGRIX) 2018, 10/24/2018       Allergies:   No Known Allergies    Medications:  Current Outpatient Medications   Medication     alfuzosin ER (UROXATRAL) 10 MG 24 hr tablet     amLODIPine (NORVASC) 10 MG tablet     aspirin (ASA) 325 MG EC tablet     blood glucose (NO BRAND SPECIFIED) lancets standard     blood  "glucose (ONETOUCH VERIO IQ) test strip     metoprolol tartrate 75 MG TABS     multivitamin w/minerals (THERA-VIT-M) tablet     mycophenolate (GENERIC EQUIVALENT) 250 MG capsule     omeprazole (PRILOSEC) 20 MG CR capsule     polyethylene glycol (MIRALAX/GLYCOLAX) powder     senna-docusate (SENOKOT-S/PERICOLACE) 8.6-50 MG tablet     Sharps Container MISC     sulfamethoxazole-trimethoprim (BACTRIM/SEPTRA) 400-80 MG tablet     tacrolimus (GENERIC EQUIVALENT) 0.5 MG capsule     tacrolimus (GENERIC EQUIVALENT) 1 MG capsule     ursodiol (ACTIGALL) 250 MG tablet     valGANciclovir (VALCYTE) 450 MG tablet     bumetanide (BUMEX) 1 MG tablet     glucose 40 % (400 mg/mL) GEL gel     insulin pen needle (32G X 4 MM) 32G X 4 MM miscellaneous     traMADol (ULTRAM) 50 MG tablet     No current facility-administered medications for this visit.        Past Medical Hx:  Past Medical History:   Diagnosis Date     Biliary stricture of transplanted liver (H) 2018     Cancer (H)     hepatocellualr carcinoma     Cirrhosis of liver (H) 2018     Enlarged prostate      Inguinal hernia     Repaired with mesh on 18     Liver lesion 2018     Liver transplanted (H) 2018     donor liver transplant     Portal vein thrombosis     on path explant     Postoperative atrial fibrillation (H) 2018     Pre-diabetes 2018         Family History:  Family History   Problem Relation Age of Onset     Liver Disease Brother          Review of Systems:  10 systems reviewed, pertinent positives noted in my HPI.      Examination:  Vital signs:   /56   Pulse 51   Temp 97.7  F (36.5  C) (Oral)   Ht 1.702 m (5' 7\")   Wt 76.6 kg (168 lb 14.4 oz)   SpO2 97%   BMI 26.45 kg/m       Constitutional: Patient in no distress  Eyes: not pale, mildly jaundiced  Neck: no lymphadenopathy  CVS: no added sounds  RS: clear  Abdomen: soft, BS+, gertrude Papa incision present and healing well  Skin: no rash  Extremities: bilateral " pitting pedal edema  Psych: Alert and oriented x 3    Laboratory:  Hematology:  Recent Labs   Lab Test 19  1024 19  0815 19  0810   WBC 3.8* 4.1 3.9*   RBC 3.22* 3.32* 3.30*   HGB 9.6* 10.2* 9.9*   HCT 29.9* 30.0* 29.8*   MCV 93 90 90   MCH 29.8 30.7 30.0   MCHC 32.1 34.0 33.2   RDW 13.5 13.5 13.7    234 251       Chemistry:  Recent Labs   Lab Test 19  1024 19  0815 19  0810    140 139   POTASSIUM 5.7* 5.1 5.1   CHLORIDE 112* 111* 111*   CO2 22 22 23   ANIONGAP 4 7 5   * 149* 148*   BUN 16 23 21   CR 1.00 0.81 0.83   YELENA 8.5 8.8 8.5       Liver Function Studies:     Recent Labs   Lab Test 19  1024 19  0815 19  0810   PROTTOTAL 6.8 7.1 6.9   ALBUMIN 3.3* 3.6 3.3*   BILITOTAL 0.6 0.6 0.6   ALKPHOS 306* 348* 359*   AST 9 15 11   ALT 20 22 18       Microbiology:  18   Quant positive     Strongy, toxoplasma and cysticercus Ab negative     Imagin2018   CT chest   Multifocal area of groundglass opacities of the bilateral  lower lobes, to lesser extent left upper lobes. Differentials  including focal atelectasis versus infection. Short-term follow-up to  make sure resolution is recommended.   1.  4 mm part solid pulmonary nodule within the right lower lobe  posterior medially, attention on follow-up is recommended.  3. Right hepatic lobe hypoattenuating lesion measures 3.8 x 3.0 cm.  Hypoattenuating lesion in the posterior right hepatic lobe measures  1.9 cm. Limited evaluation due to single phase study. Please refer to  same-day MRI study for further characterization.  4. Gallbladder wall thickening. Consider ultrasound.    Again, thank you for allowing me to participate in the care of your patient.      Sincerely,    Pauline Clancy MD

## 2019-02-07 LAB
ALBUMIN SERPL-MCNC: 3.1 G/DL (ref 3.4–5)
ALP SERPL-CCNC: 256 U/L (ref 40–150)
ALT SERPL W P-5'-P-CCNC: 15 U/L (ref 0–70)
ANION GAP SERPL CALCULATED.3IONS-SCNC: 7 MMOL/L (ref 3–14)
AST SERPL W P-5'-P-CCNC: 10 U/L (ref 0–45)
BILIRUB DIRECT SERPL-MCNC: 0.2 MG/DL (ref 0–0.2)
BILIRUB SERPL-MCNC: 0.4 MG/DL (ref 0.2–1.3)
BUN SERPL-MCNC: 17 MG/DL (ref 7–30)
CALCIUM SERPL-MCNC: 8.3 MG/DL (ref 8.5–10.1)
CHLORIDE SERPL-SCNC: 112 MMOL/L (ref 94–109)
CO2 SERPL-SCNC: 21 MMOL/L (ref 20–32)
CREAT SERPL-MCNC: 0.77 MG/DL (ref 0.66–1.25)
ERYTHROCYTE [DISTWIDTH] IN BLOOD BY AUTOMATED COUNT: 13.4 % (ref 10–15)
GFR SERPL CREATININE-BSD FRML MDRD: >90 ML/MIN/{1.73_M2}
GLUCOSE SERPL-MCNC: 168 MG/DL (ref 70–99)
HCT VFR BLD AUTO: 26.7 % (ref 40–53)
HGB BLD-MCNC: 9.2 G/DL (ref 13.3–17.7)
MAGNESIUM SERPL-MCNC: 1.7 MG/DL (ref 1.6–2.3)
MCH RBC QN AUTO: 30.5 PG (ref 26.5–33)
MCHC RBC AUTO-ENTMCNC: 34.5 G/DL (ref 31.5–36.5)
MCV RBC AUTO: 88 FL (ref 78–100)
PHOSPHATE SERPL-MCNC: 3.7 MG/DL (ref 2.5–4.5)
PLATELET # BLD AUTO: 164 10E9/L (ref 150–450)
POTASSIUM SERPL-SCNC: 5.2 MMOL/L (ref 3.4–5.3)
PROT SERPL-MCNC: 6.5 G/DL (ref 6.8–8.8)
RBC # BLD AUTO: 3.02 10E12/L (ref 4.4–5.9)
SODIUM SERPL-SCNC: 140 MMOL/L (ref 133–144)
TACROLIMUS BLD-MCNC: 8.4 UG/L (ref 5–15)
TME LAST DOSE: NORMAL H
WBC # BLD AUTO: 3.4 10E9/L (ref 4–11)

## 2019-02-07 PROCEDURE — 84100 ASSAY OF PHOSPHORUS: CPT | Performed by: TRANSPLANT SURGERY

## 2019-02-07 PROCEDURE — 85027 COMPLETE CBC AUTOMATED: CPT | Performed by: TRANSPLANT SURGERY

## 2019-02-07 PROCEDURE — 80076 HEPATIC FUNCTION PANEL: CPT | Performed by: TRANSPLANT SURGERY

## 2019-02-07 PROCEDURE — 80048 BASIC METABOLIC PNL TOTAL CA: CPT | Performed by: TRANSPLANT SURGERY

## 2019-02-07 PROCEDURE — 83735 ASSAY OF MAGNESIUM: CPT | Performed by: TRANSPLANT SURGERY

## 2019-02-07 PROCEDURE — 80197 ASSAY OF TACROLIMUS: CPT | Performed by: TRANSPLANT SURGERY

## 2019-02-08 ENCOUNTER — OFFICE VISIT (OUTPATIENT)
Dept: FAMILY MEDICINE | Facility: CLINIC | Age: 64
End: 2019-02-08
Payer: MEDICARE

## 2019-02-08 VITALS
DIASTOLIC BLOOD PRESSURE: 53 MMHG | SYSTOLIC BLOOD PRESSURE: 123 MMHG | RESPIRATION RATE: 20 BRPM | WEIGHT: 171.5 LBS | HEART RATE: 52 BPM | BODY MASS INDEX: 26.86 KG/M2 | OXYGEN SATURATION: 98 %

## 2019-02-08 DIAGNOSIS — R49.9 CHANGE OF VOICE: ICD-10-CM

## 2019-02-08 DIAGNOSIS — Z87.898 HISTORY OF ALCOHOL USE DISORDER: ICD-10-CM

## 2019-02-08 DIAGNOSIS — H04.123 DRY EYES: Primary | ICD-10-CM

## 2019-02-08 DIAGNOSIS — Z09 HOSPITAL DISCHARGE FOLLOW-UP: ICD-10-CM

## 2019-02-08 DIAGNOSIS — Z87.891 HISTORY OF CIGARETTE SMOKING: ICD-10-CM

## 2019-02-08 RX ORDER — LANOLIN ALCOHOL/MO/W.PET/CERES
50 CREAM (GRAM) TOPICAL DAILY
Qty: 60 TABLET | Refills: 0 | Status: SHIPPED | OUTPATIENT
Start: 2019-02-08 | End: 2019-05-13

## 2019-02-08 RX ORDER — POLYVINYL ALCOHOL 14 MG/ML
1 SOLUTION/ DROPS OPHTHALMIC PRN
Qty: 15 ML | Refills: 3 | Status: SHIPPED | OUTPATIENT
Start: 2019-02-08 | End: 2019-06-26

## 2019-02-08 RX ORDER — ISONIAZID 300 MG/1
300 TABLET ORAL DAILY
Qty: 60 TABLET | Refills: 0 | Status: SHIPPED | OUTPATIENT
Start: 2019-02-08 | End: 2019-05-13

## 2019-02-08 ASSESSMENT — PAIN SCALES - GENERAL: PAINLEVEL: NO PAIN (0)

## 2019-02-08 NOTE — NURSING NOTE
All instructions, information and questions were done with the assistance of an interpretor.Cristal Koenig LPN 3:50 PM on 2/8/2019

## 2019-02-08 NOTE — NURSING NOTE
"Chief Complaint   Patient presents with     Throat Problem     \" Sometimes I feel like my throat is closing.\"      Eye Problem     Would like medicatio for dry eyes   Cristal Koenig LPN 3:03 PM on 2/8/2019      "

## 2019-02-08 NOTE — PROGRESS NOTES
"[facility] [place]   Jaswinder Anne MD  02/08/2019      Chief Complaint:   Throat Problem and Eye Problem       History of Present Illness:   Loy Limon is a 63 year old male with a history of bilateral wheezing and hypoxia who presents for evaluation of a throat problem and an eye problem, accompanied by an . Per the , Loy reports that nearly every night for the past 4 months he wakes up in the middle of the night feeling very short of breath, which he believes is due to having mucus caught and built up in his throat. When this occurs, Loy drinks water, which he reports does clear his throat enough for him to go back to sleep. Loy adds that he also feels mucus in his throat during the day and when lying down while awake, but he feels that the mucus only blocks his airway while he is sleeping because he cannot clear his throat in his sleep. Loy believes that this issue is related to changes in the weather. Loy also reports that his voice is hoarse today because of this issue. He also notes that he snores \"a little,\" does have a history of smoking but is not currently a heavy smoker, and does have a history of alcohol use, but is not currently a heavy drinker.     Loy reports that he feels like there is something in his eye, similar to small particles of sand. He has been putting a Greek brand of eye drops in his eyes to reduce his discomfort, which he reports is effective, although he also experiences a burning sensation in his eyes when he applies the drops. He also reports dry mouth.     Other concerns discussed:  Loy brings a note instructing him to bring all his current medications to the ID clinic today. Recent inpt notes rvwd.    Loy's eye exams are not up to date.       Review of Systems:   Pertinent items are noted in HPI, remainder of complete ROS is negative.      Active Medications:   Current Outpatient " Medications:      alfuzosin ER (UROXATRAL) 10 MG 24 hr tablet, Take 1 tablet (10 mg) by mouth daily, Disp: 90 tablet, Rfl: 3     amLODIPine (NORVASC) 10 MG tablet, Take 1 tablet (10 mg) by mouth daily, Disp: 30 tablet, Rfl: 11     aspirin (ASA) 325 MG EC tablet, Take 1 tablet (325 mg) by mouth daily, Disp: 30 tablet, Rfl: 5     blood glucose (NO BRAND SPECIFIED) lancets standard, Use to test blood sugar 4 times daily or as directed., Disp: 200 each, Rfl: 11     blood glucose (ONETOUCH VERIO IQ) test strip, Use to test blood sugar 4 times daily or as directed., Disp: 200 strip, Rfl: 11     glucose 40 % (400 mg/mL) GEL gel, Take 15-30 g by mouth every 15 minutes as needed for low blood sugar, Disp: 30 g, Rfl: 3     insulin pen needle (32G X 4 MM) 32G X 4 MM miscellaneous, Use once pen needles daily or as directed., Disp: 100 each, Rfl: 3     metoprolol tartrate 75 MG TABS, Take 75 mg by mouth 2 times daily, Disp: 60 tablet, Rfl: 11     multivitamin w/minerals (THERA-VIT-M) tablet, Take 1 tablet by mouth daily, Disp: 90 tablet, Rfl: 3     mycophenolate (GENERIC EQUIVALENT) 250 MG capsule, Take 3 capsules (750 mg) by mouth 2 times daily, Disp: 180 capsule, Rfl: 11     omeprazole (PRILOSEC) 20 MG CR capsule, Take 20 mg by mouth daily , Disp: , Rfl:      polyethylene glycol (MIRALAX/GLYCOLAX) powder, Take 17 g (1 capful) by mouth 2 times daily Hold for loose stools, Disp: 850 g, Rfl: 3     polyvinyl alcohol (LIQUIFILM TEARS) 1.4 % ophthalmic solution, Place 1 drop into both eyes as needed for dry eyes, Disp: 15 mL, Rfl: 3     senna-docusate (SENOKOT-S/PERICOLACE) 8.6-50 MG tablet, Take 2 tablets by mouth 2 times daily Hold for loose stools, Disp: 120 tablet, Rfl: 0     Sharps Container MISC, 1 each daily, Disp: 1 each, Rfl: 2     sulfamethoxazole-trimethoprim (BACTRIM/SEPTRA) 400-80 MG tablet, Take 1 tablet by mouth daily, Disp: 30 tablet, Rfl: 5     tacrolimus (GENERIC EQUIVALENT) 0.5 MG capsule, Take 1 cap by mouth  "twice daily as directed by Transplant Center for dose adjustment, Disp: 60 capsule, Rfl: 0     tacrolimus (GENERIC EQUIVALENT) 1 MG capsule, Take 8 capsules (8 mg) by mouth 2 times daily, Disp: 480 capsule, Rfl: 3     traMADol (ULTRAM) 50 MG tablet, Take 1 tablet (50 mg) by mouth every 6 hours as needed for moderate pain, Disp: 15 tablet, Rfl: 0     ursodiol (ACTIGALL) 250 MG tablet, Take 1 tablet (250 mg) by mouth 2 times daily, Disp: 60 tablet, Rfl: 11     valGANciclovir (VALCYTE) 450 MG tablet, Take 2 tablets (900 mg) by mouth daily, Disp: 69 tablet, Rfl: 0     bumetanide (BUMEX) 1 MG tablet, Take 1 tablet (1 mg) by mouth daily for 2 days (Patient not taking: Reported on 2019), Disp: 2 tablet, Rfl: 0     isoniazid (NYDRAZID) 300 MG tablet, Take 1 tablet (300 mg) by mouth daily, Disp: 60 tablet, Rfl: 0     vitamin B6 (PYRIDOXINE) 50 MG TABS, Take 1 tablet (50 mg) by mouth daily, Disp: 60 tablet, Rfl: 0      Allergies:   Patient has no known allergies.      Past Medical History:  Atrial fibrillation with rapid ventricular response  Postoperative atrial fibrillation   Portal vein thrombosis  Essential hypertension  Hyperglycemia  Hypervolemia  Bilateral wheezing  Hypoxia  Hepatocellular carcinoma  Cirrhosis of liver  Liver lesion  Biliary stricture of transplanted liver  Pre-diabetes  Drug-induced diabetes mellitus  Constipation  Inguinal hernia  Hematuria  Enlarged prostate     Past Surgical History:  Appendectomy  Herniorrhaphy inguinal, 2018  Liver biopsy percutaneous, 2018  Transplant liver recipient  donor,     Family History:   Brother: Liver disease      Social History:   Marital Status:   Presents to the clinic accompanied by an   Tobacco Use: Former cigarette and cigar smoker (smoked 20 years; 0.03 packs/day, 0.6 pack years; started 1970, quit 3/14/2018; 0.9 years since quitting), never used smokeless tobacco (comment: \"quit smoking 2018, one cigarette after " "dinner\")  Alcohol Use: No, \"quit drinking Feb 2018\"  PCP: Jaswinder Anne     Physical Exam:   /53 (BP Location: Right arm, Patient Position: Sitting, Cuff Size: Adult Regular)   Pulse 52   Resp 20   Wt 77.8 kg (171 lb 8 oz)   SpO2 98%   BMI 26.86 kg/m     Constitutional: Alert. In no distress.  Head: The scalp, face, and head appear normal.  ENT/Neck: Normal eyes, mouth, and neck.   Musculoskeletal: Extremities appear normal. No gross deformities noted.   Neurologic: Gait normal. Speech is normal and fluent.  Psychiatric: Mentation appears normal. Normal affect.      Assessment and Plan:  Dry eyes  - OPHTHALMOLOGY ADULT REFERRAL  - polyvinyl alcohol (LIQUIFILM TEARS) 1.4 % ophthalmic solution  Dispense: 15 mL; Refill: 3    Change of voice, history of cigarette smoking, history of alcohol use disorder  His symptoms at night of waking up could be from some phlegm in his throat, but if ENT evaluation is negative we might need to pursue a sleep apnea evaluation.   - OTOLARYNGOLOGY REFERRAL      Follow-up: Data Unavailable         Scribe Disclosure:   I, Sweetie Elizalde, am serving as a scribe to document services personally performed by Jaswinder Anne MD at this visit, based upon the provider's statements to me. All documentation has been reviewed by the aforementioned provider prior to being entered into the official medical record.     Portions of this medical record were completed by a scribe. UPON MY REVIEW AND AUTHENTICATION BY ELECTRONIC SIGNATURE, this confirms (a) I performed the applicable clinical services, and (b) the record is accurate.   Jaswinder Anne MD    "

## 2019-02-11 ENCOUNTER — DOCUMENTATION ONLY (OUTPATIENT)
Dept: FAMILY MEDICINE | Facility: CLINIC | Age: 64
End: 2019-02-11

## 2019-02-11 ENCOUNTER — TELEPHONE (OUTPATIENT)
Dept: TRANSPLANT | Facility: CLINIC | Age: 64
End: 2019-02-11

## 2019-02-11 ENCOUNTER — OFFICE VISIT (OUTPATIENT)
Dept: ENDOCRINOLOGY | Facility: CLINIC | Age: 64
End: 2019-02-11
Payer: MEDICARE

## 2019-02-11 VITALS
HEIGHT: 67 IN | HEART RATE: 56 BPM | BODY MASS INDEX: 27.39 KG/M2 | WEIGHT: 174.5 LBS | DIASTOLIC BLOOD PRESSURE: 53 MMHG | SYSTOLIC BLOOD PRESSURE: 122 MMHG

## 2019-02-11 DIAGNOSIS — T38.0X5A STEROID-INDUCED HYPERGLYCEMIA: Primary | ICD-10-CM

## 2019-02-11 DIAGNOSIS — R73.9 STEROID-INDUCED HYPERGLYCEMIA: Primary | ICD-10-CM

## 2019-02-11 ASSESSMENT — PAIN SCALES - GENERAL: PAINLEVEL: NO PAIN (0)

## 2019-02-11 ASSESSMENT — MIFFLIN-ST. JEOR: SCORE: 1535.16

## 2019-02-11 NOTE — PROGRESS NOTES
[facility] now requests orders and shares plan of care/discharge summaries for some patients through [employer].  Please REPLY TO THIS MESSAGE OR ROUTE BACK TO THE AUTHOR in order to give authorization for orders when needed.  This is considered a verbal order, you will still receive a faxed copy of orders for signature.  Thank you for your assistance in improving collaboration for our patients.    ORDER    Dr. Anne,     Requesting the following Home Care orders:  Skilled Nursing 2w3, 3 PRN for medication management, post transplant education and assessment, biweekly transplant labs.    Thank you,   René Curran RN Case Manager   [phone]  [email or web address]

## 2019-02-11 NOTE — PATIENT INSTRUCTIONS
1. Continue to check your blood sugars once or twice per day.   2. Follow up with your primary doctor in 1-2 months, they will help you manage your blood sugars in the future.   3. Eat more lean meats (chicken, fish), vegetables, and 1-2 servings of fruit per day. Reduce your intake of pasta, bread, candy and desserts. Avoid soda pop and juices.

## 2019-02-11 NOTE — PROGRESS NOTES
[facility] Endocrinology- Outpatient Diabetes Follow up      Loy is a 63 year old male with history of Laennec's cirrhosis with HCC, portal hypertension, latent Tb who underwent living donor liver transplant 12/22/2018. He developed steroid/immunosuppressant induced diabetes and was maintained on NPH insulin during his prednisone taper, which he completed on 1/14. His BG remained in reasonable control, and his insulin was also discontinued on the last day of his steroid taper. He was instructed to follow up with me in one month for BG review, and consideration of an oral agent if required.     Most recent BMP shows a glucose of 168 on 2/7/19.    Patient reports he has continued to check his BG twice daily; he only has three numbers documented for review in his log book:   2/10/19 Fasting , pre-lunch 119  2/11/19 Fasting   He reports only one incident where he had a recent BG >200- and it was after he had a sugary smoothie drink.     Pt denies headaches, visual changes, n/v,  chest pain, palpitations. He did recently see a provider for some shortness of breath and dry mouth occurring at night; he is going to be evaluated by ENT, and possibly by sleep medicine.     Current Diabetes Regimen:   Prednisone taper complete as of 1/14/19.   Not on any antihyperglycemics at this time- diet controlled.    Glucometer Settings  See above for recent glucose data     Weight: stable to improving, has gained 3 lbs since our last visit one month ago.   Physical Activity: improving   Nutrition: improving. Has been eating from all food groups, including sugar/ carbohydrates, but has been minimizing portions.    Diabetes Care  Retinopathy: no    Nephropathy: BP well controlled.  Creatinine 0.77 (stable to improved).    Neuropathy: no    ROS: 10 point review of systems completed; pertinent positives and negatives documented in HPI    Allergies  No Known Allergies    Medications  Current Outpatient Medications   Medication  Sig Dispense Refill     alfuzosin ER (UROXATRAL) 10 MG 24 hr tablet Take 1 tablet (10 mg) by mouth daily 90 tablet 3     amLODIPine (NORVASC) 10 MG tablet Take 1 tablet (10 mg) by mouth daily 30 tablet 11     aspirin (ASA) 325 MG EC tablet Take 1 tablet (325 mg) by mouth daily 30 tablet 5     blood glucose (NO BRAND SPECIFIED) lancets standard Use to test blood sugar 4 times daily or as directed. 200 each 11     blood glucose (ONETOUCH VERIO IQ) test strip Use to test blood sugar 4 times daily or as directed. 200 strip 11     bumetanide (BUMEX) 1 MG tablet Take 1 tablet (1 mg) by mouth daily for 2 days (Patient not taking: Reported on 2/4/2019) 2 tablet 0     glucose 40 % (400 mg/mL) GEL gel Take 15-30 g by mouth every 15 minutes as needed for low blood sugar 30 g 3     insulin pen needle (32G X 4 MM) 32G X 4 MM miscellaneous Use once pen needles daily or as directed. 100 each 3     isoniazid (NYDRAZID) 300 MG tablet Take 1 tablet (300 mg) by mouth daily 60 tablet 0     metoprolol tartrate 75 MG TABS Take 75 mg by mouth 2 times daily 60 tablet 11     multivitamin w/minerals (THERA-VIT-M) tablet Take 1 tablet by mouth daily 90 tablet 3     mycophenolate (GENERIC EQUIVALENT) 250 MG capsule Take 3 capsules (750 mg) by mouth 2 times daily 180 capsule 11     omeprazole (PRILOSEC) 20 MG CR capsule Take 20 mg by mouth daily        polyethylene glycol (MIRALAX/GLYCOLAX) powder Take 17 g (1 capful) by mouth 2 times daily Hold for loose stools 850 g 3     polyvinyl alcohol (LIQUIFILM TEARS) 1.4 % ophthalmic solution Place 1 drop into both eyes as needed for dry eyes 15 mL 3     senna-docusate (SENOKOT-S/PERICOLACE) 8.6-50 MG tablet Take 2 tablets by mouth 2 times daily Hold for loose stools 120 tablet 0     Sharps Container MISC 1 each daily 1 each 2     sulfamethoxazole-trimethoprim (BACTRIM/SEPTRA) 400-80 MG tablet Take 1 tablet by mouth daily 30 tablet 5     tacrolimus (GENERIC EQUIVALENT) 0.5 MG capsule Take 1 cap by  mouth twice daily as directed by Transplant Center for dose adjustment 60 capsule 0     tacrolimus (GENERIC EQUIVALENT) 1 MG capsule Take 8 capsules (8 mg) by mouth 2 times daily 480 capsule 3     traMADol (ULTRAM) 50 MG tablet Take 1 tablet (50 mg) by mouth every 6 hours as needed for moderate pain 15 tablet 0     ursodiol (ACTIGALL) 250 MG tablet Take 1 tablet (250 mg) by mouth 2 times daily 60 tablet 11     valGANciclovir (VALCYTE) 450 MG tablet Take 2 tablets (900 mg) by mouth daily 69 tablet 0     vitamin B6 (PYRIDOXINE) 50 MG TABS Take 1 tablet (50 mg) by mouth daily 60 tablet 0       Family History  family history includes Liver Disease in his brother.    Social History   reports that he quit smoking about 10 months ago. His smoking use included cigarettes and cigars. He started smoking about 48 years ago. He has a 0.60 pack-year smoking history. he has never used smokeless tobacco. He reports that he does not drink alcohol or use drugs.     Past Medical History  Past Medical History:   Diagnosis Date     Biliary stricture of transplanted liver (H) 2018     Cancer (H)     hepatocellualr carcinoma     Cirrhosis of liver (H) 2018     Enlarged prostate      Inguinal hernia     Repaired with mesh on 18     Liver lesion 2018     Liver transplanted (H) 2018     donor liver transplant     Portal vein thrombosis     on path explant     Postoperative atrial fibrillation (H) 2018     Pre-diabetes 2018       Past Surgical History:   Procedure Laterality Date     APPENDECTOMY       COLONOSCOPY           ENDOSCOPIC RETROGRADE CHOLANGIOPANCREATOGRAM N/A 2018    Procedure: ENDOSCOPIC RETROGRADE CHOLANGIOPANCREATOGRAM, with Billary Sphincterotomy and Billary Stent Placement;  Surgeon: Omero Lawler MD;  Location: [facility]     HERNIORRHAPHY INGUINAL  2018    Procedure: Herniorrhaphy inguinal;  Surgeon: Emil Echevarria MD;  Location: [facility] OR     IR LIVER  "BIOPSY PERCUTANEOUS  2018     TRANSPLANT LIVER RECIPIENT  DONOR N/A 2018    Procedure: TRANSPLANT LIVER RECIPIENT  DONOR;  Surgeon: Emil Echevarria MD;  Location: [facility] OR       Physical Exam  /53   Pulse 56   Ht 1.702 m (5' 7\")   Wt 79.2 kg (174 lb 8 oz)   BMI 27.33 kg/m    Body mass index is 27.33 kg/m .    GENERAL : In no apparent distress.   SKIN: Normal color, normal temperature, texture.  No  suspicious lesions or rashes  EYES: PERRLA.  No proptosis.  MOUTH: Moist, pink; pharynx clear  NECK: No visible masses.  RESP: normal respiratory effort.  cough   ABDOMEN: soft, nontender to palpation   NEURO: awake, alert, responds appropriately to questions.  Moves all extremities; Gait normal.     EXTREMITIES: No ulcers or open wounds.     RESULTS    Lab Results   Component Value Date    A1C 5.8 2018       Creatinine   Date Value Ref Range Status   2019 0.77 0.66 - 1.25 mg/dL Final     GFR Estimate   Date Value Ref Range Status   2019 >90 >60 mL/min/[1.73_m2] Final     Comment:     Non  GFR Calc  Starting 2018, serum creatinine based estimated GFR (eGFR) will be   calculated using the Chronic Kidney Disease Epidemiology Collaboration   (CKD-EPI) equation.       Hemoglobin A1C   Date Value Ref Range Status   2018 5.8 (H) 0 - 5.6 % Final     Comment:     Normal <5.7% Prediabetes 5.7-6.4%  Diabetes 6.5% or higher - adopted from ADA   consensus guidelines.       Potassium   Date Value Ref Range Status   2019 5.2 3.4 - 5.3 mmol/L Final     ALT   Date Value Ref Range Status   2019 15 0 - 70 U/L Final     AST   Date Value Ref Range Status   2019 10 0 - 45 U/L Final     TSH   Date Value Ref Range Status   2018 6.36 (H) 0.40 - 4.00 mU/L Final     T4 Free   Date Value Ref Range Status   2018 1.04 0.76 - 1.46 ng/dL Final       TSH   Date Value Ref Range Status   2018 6.36 (H) 0.40 - 4.00 mU/L Final     T4 " Free   Date Value Ref Range Status   07/19/2018 1.04 0.76 - 1.46 ng/dL Final       Creatinine   Date Value Ref Range Status   02/07/2019 0.77 0.66 - 1.25 mg/dL Final       ASSESSMENT/PLAN:    1. Steroid induced, post-liver transplant hyperglycemia. Now off prednisone, continues on tacrolimus and mycophenolate.    -most recent A1c 5.8 on 12/22/19 (anemic to 8.2 at that time)   -BG are reasonably controlled off medications a this time, re-enforced dietary modification. He declined need to meet with diabetes educator.    -continue checking glucose once in the morning before breakfast, and once before either lunch or dinner    2. Elevated TSH: 6.36 on 7/19/18, with normal free T4.    recommend recheck labs in 6 months.       Follow up:  1. With primary care provider in 1-2 months, [facility]  2. He will be due for  A1c, vitamin D, urine microalbumin labs in March.  3. He will be due for TSH in July.    The patient is  enrolled in [employer] services    I spent 30 minutes with this patient face to face and explained the conditions and plans (more than 50% of time was counseling/coordination of care, diabetes management, follow up plan for worsening hyper and hypoglycemia) . The patient understood and is satisfied with today's visit.     A professional [place] in person  was utilized for this visit.     ARLETH Coffey, PAMarcellC  [facility]   Pager [phone]

## 2019-02-11 NOTE — TELEPHONE ENCOUNTER
Per ID for latent TB:    - increase valcyte prophylaxis to 900 mg daily   - start Isoniazid 300 mg daily and pyridoxine 50 mg daily (he did not complete latent TB treatment prior to transplant- short by 1 month) for 2 months   - weekly LFTS while on isoniazid. I presume transplant team is anyway checking weekly labs. That will suffice.

## 2019-02-11 NOTE — LETTER
2/11/2019       RE: Loy Limon  [address]  [place] [address]     Dear Colleague,    Thank you for referring your patient, Loy Limon, to the [facility] ENDOCRINOLOGY at [facility]. Please see a copy of my visit note below.    [facility]- [facility]patient Diabetes Follow up      Loy is a 63 year old male with history of Laennec's cirrhosis with HCC, portal hypertension, latent Tb who underwent living donor liver transplant 12/22/2018. He developed steroid/immunosuppressant induced diabetes and was maintained on NPH insulin during his prednisone taper, which he completed on 1/14. His BG remained in reasonable control, and his insulin was also discontinued on the last day of his steroid taper. He was instructed to follow up with me in one month for BG review, and consideration of an oral agent if required.     Most recent BMP shows a glucose of 168 on 2/7/19.    Patient reports he has continued to check his BG twice daily; he only has three numbers documented for review in his log book:   2/10/19 Fasting , pre-lunch 119  2/11/19 Fasting   He reports only one incident where he had a recent BG >200- and it was after he had a sugary smoothie drink.     Pt denies headaches, visual changes, n/v,  chest pain, palpitations. He did recently see a provider for some shortness of breath and dry mouth occurring at night; he is going to be evaluated by ENT, and possibly by sleep medicine.     Current Diabetes Regimen:   Prednisone taper complete as of 1/14/19.   Not on any antihyperglycemics at this time- diet controlled.    Glucometer Settings  See above for recent glucose data     Weight: stable to improving, has gained 3 lbs since our last visit one month ago.   Physical Activity: improving   Nutrition: improving. Has been eating from all food groups, including sugar/ carbohydrates, but has been minimizing portions.    Diabetes  Care  Retinopathy: no    Nephropathy: BP well controlled.  Creatinine 0.77 (stable to improved).    Neuropathy: no    ROS: 10 point review of systems completed; pertinent positives and negatives documented in HPI    Allergies  No Known Allergies    Medications  Current Outpatient Medications   Medication Sig Dispense Refill     alfuzosin ER (UROXATRAL) 10 MG 24 hr tablet Take 1 tablet (10 mg) by mouth daily 90 tablet 3     amLODIPine (NORVASC) 10 MG tablet Take 1 tablet (10 mg) by mouth daily 30 tablet 11     aspirin (ASA) 325 MG EC tablet Take 1 tablet (325 mg) by mouth daily 30 tablet 5     blood glucose (NO BRAND SPECIFIED) lancets standard Use to test blood sugar 4 times daily or as directed. 200 each 11     blood glucose (ONETOUCH VERIO IQ) test strip Use to test blood sugar 4 times daily or as directed. 200 strip 11     bumetanide (BUMEX) 1 MG tablet Take 1 tablet (1 mg) by mouth daily for 2 days (Patient not taking: Reported on 2/4/2019) 2 tablet 0     glucose 40 % (400 mg/mL) GEL gel Take 15-30 g by mouth every 15 minutes as needed for low blood sugar 30 g 3     insulin pen needle (32G X 4 MM) 32G X 4 MM miscellaneous Use once pen needles daily or as directed. 100 each 3     isoniazid (NYDRAZID) 300 MG tablet Take 1 tablet (300 mg) by mouth daily 60 tablet 0     metoprolol tartrate 75 MG TABS Take 75 mg by mouth 2 times daily 60 tablet 11     multivitamin w/minerals (THERA-VIT-M) tablet Take 1 tablet by mouth daily 90 tablet 3     mycophenolate (GENERIC EQUIVALENT) 250 MG capsule Take 3 capsules (750 mg) by mouth 2 times daily 180 capsule 11     omeprazole (PRILOSEC) 20 MG CR capsule Take 20 mg by mouth daily        polyethylene glycol (MIRALAX/GLYCOLAX) powder Take 17 g (1 capful) by mouth 2 times daily Hold for loose stools 850 g 3     polyvinyl alcohol (LIQUIFILM TEARS) 1.4 % ophthalmic solution Place 1 drop into both eyes as needed for dry eyes 15 mL 3     senna-docusate (SENOKOT-S/PERICOLACE) 8.6-50 MG  tablet Take 2 tablets by mouth 2 times daily Hold for loose stools 120 tablet 0     Sharps Container MISC 1 each daily 1 each 2     sulfamethoxazole-trimethoprim (BACTRIM/SEPTRA) 400-80 MG tablet Take 1 tablet by mouth daily 30 tablet 5     tacrolimus (GENERIC EQUIVALENT) 0.5 MG capsule Take 1 cap by mouth twice daily as directed by Transplant Center for dose adjustment 60 capsule 0     tacrolimus (GENERIC EQUIVALENT) 1 MG capsule Take 8 capsules (8 mg) by mouth 2 times daily 480 capsule 3     traMADol (ULTRAM) 50 MG tablet Take 1 tablet (50 mg) by mouth every 6 hours as needed for moderate pain 15 tablet 0     ursodiol (ACTIGALL) 250 MG tablet Take 1 tablet (250 mg) by mouth 2 times daily 60 tablet 11     valGANciclovir (VALCYTE) 450 MG tablet Take 2 tablets (900 mg) by mouth daily 69 tablet 0     vitamin B6 (PYRIDOXINE) 50 MG TABS Take 1 tablet (50 mg) by mouth daily 60 tablet 0       Family History  family history includes Liver Disease in his brother.    Social History   reports that he quit smoking about 10 months ago. His smoking use included cigarettes and cigars. He started smoking about 48 years ago. He has a 0.60 pack-year smoking history. he has never used smokeless tobacco. He reports that he does not drink alcohol or use drugs.     Past Medical History  Past Medical History:   Diagnosis Date     Biliary stricture of transplanted liver (H) 2018     Cancer (H)     hepatocellualr carcinoma     Cirrhosis of liver (H) 2018     Enlarged prostate      Inguinal hernia     Repaired with mesh on 18     Liver lesion 2018     Liver transplanted (H) 2018     donor liver transplant     Portal vein thrombosis     on path explant     Postoperative atrial fibrillation (H) 2018     Pre-diabetes 2018       Past Surgical History:   Procedure Laterality Date     APPENDECTOMY       COLONOSCOPY      2018     ENDOSCOPIC RETROGRADE CHOLANGIOPANCREATOGRAM N/A 2018    Procedure:  "ENDOSCOPIC RETROGRADE CHOLANGIOPANCREATOGRAM, with Billary Sphincterotomy and Billary Stent Placement;  Surgeon: Omero Lawler MD;  Location: [facility] OR     HERNIORRHAPHY INGUINAL  2018    Procedure: Herniorrhaphy inguinal;  Surgeon: Emil Echevarria MD;  Location: [facility] OR     IR LIVER BIOPSY PERCUTANEOUS  2018     TRANSPLANT LIVER RECIPIENT  DONOR N/A 2018    Procedure: TRANSPLANT LIVER RECIPIENT  DONOR;  Surgeon: Emil Echevarria MD;  Location: [facility] OR       Physical Exam  /53   Pulse 56   Ht 1.702 m (5' 7\")   Wt 79.2 kg (174 lb 8 oz)   BMI 27.33 kg/m     Body mass index is 27.33 kg/m .    GENERAL : In no apparent distress.   SKIN: Normal color, normal temperature, texture.  No  suspicious lesions or rashes  EYES: PERRLA.  No proptosis.  MOUTH: Moist, pink; pharynx clear  NECK: No visible masses.  RESP: normal respiratory effort.  cough   ABDOMEN: soft, nontender to palpation   NEURO: awake, alert, responds appropriately to questions.  Moves all extremities; Gait normal.     EXTREMITIES: No ulcers or open wounds.     RESULTS    Lab Results   Component Value Date    A1C 5.8 2018       Creatinine   Date Value Ref Range Status   2019 0.77 0.66 - 1.25 mg/dL Final     GFR Estimate   Date Value Ref Range Status   2019 >90 >60 mL/min/[1.73_m2] Final     Comment:     Non  GFR Calc  Starting 2018, serum creatinine based estimated GFR (eGFR) will be   calculated using the Chronic Kidney Disease Epidemiology Collaboration   (CKD-EPI) equation.       Hemoglobin A1C   Date Value Ref Range Status   2018 5.8 (H) 0 - 5.6 % Final     Comment:     Normal <5.7% Prediabetes 5.7-6.4%  Diabetes 6.5% or higher - adopted from ADA   consensus guidelines.       Potassium   Date Value Ref Range Status   2019 5.2 3.4 - 5.3 mmol/L Final     ALT   Date Value Ref Range Status   2019 15 0 - 70 U/L Final     AST   Date Value Ref Range " Status   02/07/2019 10 0 - 45 U/L Final     TSH   Date Value Ref Range Status   07/19/2018 6.36 (H) 0.40 - 4.00 mU/L Final     T4 Free   Date Value Ref Range Status   07/19/2018 1.04 0.76 - 1.46 ng/dL Final       TSH   Date Value Ref Range Status   07/19/2018 6.36 (H) 0.40 - 4.00 mU/L Final     T4 Free   Date Value Ref Range Status   07/19/2018 1.04 0.76 - 1.46 ng/dL Final       Creatinine   Date Value Ref Range Status   02/07/2019 0.77 0.66 - 1.25 mg/dL Final       ASSESSMENT/PLAN:    1. Steroid induced, post-liver transplant hyperglycemia. Now off prednisone, continues on tacrolimus and mycophenolate.    -most recent A1c 5.8 on 12/22/19 (anemic to 8.2 at that time)   -BG are reasonably controlled off medications a this time, re-enforced dietary modification. He declined need to meet with diabetes educator.    -continue checking glucose once in the morning before breakfast, and once before either lunch or dinner    2. Elevated TSH: 6.36 on 7/19/18, with normal free T4.    recommend recheck labs in 6 months.       Follow up:  1. With primary care provider in 1-2 months, [facility]  2. He will be due for  A1c, vitamin D, urine microalbumin labs in March.  3. He will be due for TSH in July.    The patient is  enrolled in [employer] services    I spent 30 minutes with this patient face to face and explained the conditions and plans (more than 50% of time was counseling/coordination of care, diabetes management, follow up plan for worsening hyper and hypoglycemia) . The patient understood and is satisfied with today's visit.     A professional [place] in person  was utilized for this visit.     ARLETH Coffey, PA-C  [facility]   Pager [phone]

## 2019-02-11 NOTE — PROGRESS NOTES
[facility] now requests orders and shares plan of care/discharge summaries for some patients through [employer].  Please REPLY TO THIS MESSAGE OR ROUTE BACK TO THE AUTHOR in order to give authorization for orders when needed.  This is considered a verbal order, you will still receive a faxed copy of orders for signature.  Thank you for your assistance in improving collaboration for our patients.    ORDER    Dr. Pauline Clancy,   I am Loy's CM with Home Care.  I was looking at your visit information following appointment on 2/8/19.  I wanted to see if you wanted Home Care to be checking the pt LFTs weekly?     Thank you,   René Curran RN Case Manager   [phone]  [email or web address]

## 2019-02-12 LAB
ALBUMIN SERPL-MCNC: 3.2 G/DL (ref 3.4–5)
ALP SERPL-CCNC: 220 U/L (ref 40–150)
ALT SERPL W P-5'-P-CCNC: 14 U/L (ref 0–70)
ANION GAP SERPL CALCULATED.3IONS-SCNC: 6 MMOL/L (ref 3–14)
AST SERPL W P-5'-P-CCNC: 7 U/L (ref 0–45)
BILIRUB DIRECT SERPL-MCNC: 0.2 MG/DL (ref 0–0.2)
BILIRUB SERPL-MCNC: 0.4 MG/DL (ref 0.2–1.3)
BUN SERPL-MCNC: 21 MG/DL (ref 7–30)
CALCIUM SERPL-MCNC: 7.9 MG/DL (ref 8.5–10.1)
CHLORIDE SERPL-SCNC: 112 MMOL/L (ref 94–109)
CO2 SERPL-SCNC: 19 MMOL/L (ref 20–32)
CREAT SERPL-MCNC: 0.83 MG/DL (ref 0.66–1.25)
ERYTHROCYTE [DISTWIDTH] IN BLOOD BY AUTOMATED COUNT: 13.5 % (ref 10–15)
GFR SERPL CREATININE-BSD FRML MDRD: >90 ML/MIN/{1.73_M2}
GLUCOSE SERPL-MCNC: 172 MG/DL (ref 70–99)
HCT VFR BLD AUTO: 26.7 % (ref 40–53)
HGB BLD-MCNC: 9.2 G/DL (ref 13.3–17.7)
MAGNESIUM SERPL-MCNC: 1.9 MG/DL (ref 1.6–2.3)
MCH RBC QN AUTO: 30.3 PG (ref 26.5–33)
MCHC RBC AUTO-ENTMCNC: 34.5 G/DL (ref 31.5–36.5)
MCV RBC AUTO: 88 FL (ref 78–100)
PHOSPHATE SERPL-MCNC: 3.6 MG/DL (ref 2.5–4.5)
PLATELET # BLD AUTO: 162 10E9/L (ref 150–450)
POTASSIUM SERPL-SCNC: 5 MMOL/L (ref 3.4–5.3)
PROT SERPL-MCNC: 6.6 G/DL (ref 6.8–8.8)
RBC # BLD AUTO: 3.04 10E12/L (ref 4.4–5.9)
SODIUM SERPL-SCNC: 137 MMOL/L (ref 133–144)
TACROLIMUS BLD-MCNC: 10.7 UG/L (ref 5–15)
TME LAST DOSE: NORMAL H
WBC # BLD AUTO: 3.1 10E9/L (ref 4–11)

## 2019-02-12 PROCEDURE — 80048 BASIC METABOLIC PNL TOTAL CA: CPT | Performed by: TRANSPLANT SURGERY

## 2019-02-12 PROCEDURE — 80197 ASSAY OF TACROLIMUS: CPT | Performed by: TRANSPLANT SURGERY

## 2019-02-12 PROCEDURE — 83735 ASSAY OF MAGNESIUM: CPT | Performed by: TRANSPLANT SURGERY

## 2019-02-12 PROCEDURE — 80076 HEPATIC FUNCTION PANEL: CPT | Performed by: TRANSPLANT SURGERY

## 2019-02-12 PROCEDURE — 84100 ASSAY OF PHOSPHORUS: CPT | Performed by: TRANSPLANT SURGERY

## 2019-02-12 PROCEDURE — 85027 COMPLETE CBC AUTOMATED: CPT | Performed by: TRANSPLANT SURGERY

## 2019-02-13 ENCOUNTER — TELEPHONE (OUTPATIENT)
Dept: FAMILY MEDICINE | Facility: CLINIC | Age: 64
End: 2019-02-13

## 2019-02-13 ENCOUNTER — OFFICE VISIT (OUTPATIENT)
Dept: OPHTHALMOLOGY | Facility: CLINIC | Age: 64
End: 2019-02-13
Attending: FAMILY MEDICINE
Payer: MEDICARE

## 2019-02-13 DIAGNOSIS — H04.123 DRY EYE SYNDROME OF BOTH EYES: Primary | ICD-10-CM

## 2019-02-13 ASSESSMENT — TONOMETRY
OS_IOP_MMHG: 15
OD_IOP_MMHG: 16
IOP_METHOD: ICARE

## 2019-02-13 ASSESSMENT — CONF VISUAL FIELD
METHOD: COUNTING FINGERS
OS_NORMAL: 1
OD_NORMAL: 1

## 2019-02-13 ASSESSMENT — VISUAL ACUITY
OS_SC: 20/20
OD_SC+: -3
OS_SC+: -2
OD_SC: 20/25
METHOD: SNELLEN - LINEAR

## 2019-02-13 ASSESSMENT — SLIT LAMP EXAM - LIDS
COMMENTS: LAXITY
COMMENTS: LAXITY

## 2019-02-13 ASSESSMENT — EXTERNAL EXAM - LEFT EYE: OS_EXAM: NORMAL

## 2019-02-13 ASSESSMENT — EXTERNAL EXAM - RIGHT EYE: OD_EXAM: NORMAL

## 2019-02-13 NOTE — TELEPHONE ENCOUNTER
Verbal okay given for orders as written. Skilled Nursing 2w3, 3 PRN for medication management, post transplant education and assessment, biweekly transplant labs. Angella Wheat CMA at 8:25 AM on 2/13/2019

## 2019-02-13 NOTE — PROGRESS NOTES
Assessment/Plan  (H04.123) Dry eye syndrome of both eyes  (primary encounter diagnosis)  Plan: Discussed findings with patient. Recommended using AT's 3-4x daily. Patient ok to RTC if symptoms do not improve.       Complete documentation of historical and exam elements from today's encounter can  be found in the full encounter summary report (not reduplicated in this progress  note). I personally obtained the chief complaint(s) and history of present illness. I  confirmed and edited as necessary the review of systems, past medical/surgical  history, family history, social history, and examination findings as documented by  others; and I examined the patient myself. I personally reviewed the relevant tests,  images, and reports as documented above. I formulated and edited as necessary the  assessment and plan and discussed the findings and management plan with the  patient and family.    Jay Patel, OD

## 2019-02-13 NOTE — TELEPHONE ENCOUNTER
[facility] Call Center    Phone Message    May a detailed message be left on voicemail: yes    Reason for Call: Other: René nurse  for pt. called to see if she could get verbal orders for skilled nursing. Please call [phone], OK to leave a message. Please see epic note from 2/11/19     Action Taken: Message routed to:  [facility] TAMARA

## 2019-02-13 NOTE — NURSING NOTE
Chief Complaints and History of Present Illnesses   Patient presents with     COMPREHENSIVE EYE EXAM     Chief Complaint(s) and History of Present Illness(es)     COMPREHENSIVE EYE EXAM     Laterality: both eyes    Associated symptoms: dryness and eye pain    Treatments tried: eye drops    Response to treatment: mild improvement    Pain scale: 0/10              Comments     Pt here for dry eye evaluation. Pt states vision seems stable. Does not wear glasses. Pt states feels like there is always something in both eyes. Feels like a poke or throbbing both eyes.         Antonieta KWON 10:02 AM February 13, 2019

## 2019-02-14 ENCOUNTER — TELEPHONE (OUTPATIENT)
Dept: PHARMACY | Facility: CLINIC | Age: 64
End: 2019-02-14

## 2019-02-14 ENCOUNTER — PRE VISIT (OUTPATIENT)
Dept: UROLOGY | Facility: CLINIC | Age: 64
End: 2019-02-14

## 2019-02-15 ENCOUNTER — TELEPHONE (OUTPATIENT)
Dept: FAMILY MEDICINE | Facility: CLINIC | Age: 64
End: 2019-02-15

## 2019-02-15 NOTE — ADDENDUM NOTE
Addendum  created 02/14/19 2135 by Feliciano Agudelo MD    Child order released for a procedure order, Intraprocedure Blocks edited, Sign clinical note

## 2019-02-15 NOTE — TELEPHONE ENCOUNTER
[facility] Call Center    Phone Message    May a detailed message be left on voicemail: yes    Reason for Call: Other: Alejandra, homecare nurse wanted to let clinic know they were unable to draw labs today on patient, but will do draw tomorrow.      Action Taken: Message routed to:  [facility] PCC

## 2019-02-15 NOTE — ANESTHESIA PROCEDURE NOTES
RASHAUN Probe Insertion Note:    Inserted by:  Feliciano Agudelo MD (Responsible Anesthesiologist)    Probe Status PRE Insertion: NO obvious damage  Probe type:  Adult 2D    Bite block used:   Yes  Insertion Technique: Jaw Lift  Insertion complications: None obvious    Billing Report:A RASHAUN report is NOT being generated.    Probe Status POST Removal: NO obvious damage

## 2019-02-16 LAB
ALBUMIN SERPL-MCNC: 3.5 G/DL (ref 3.4–5)
ALP SERPL-CCNC: 197 U/L (ref 40–150)
ALT SERPL W P-5'-P-CCNC: 15 U/L (ref 0–70)
ANION GAP SERPL CALCULATED.3IONS-SCNC: 7 MMOL/L (ref 3–14)
AST SERPL W P-5'-P-CCNC: 5 U/L (ref 0–45)
BILIRUB DIRECT SERPL-MCNC: 0.2 MG/DL (ref 0–0.2)
BILIRUB SERPL-MCNC: 0.6 MG/DL (ref 0.2–1.3)
BUN SERPL-MCNC: 23 MG/DL (ref 7–30)
CALCIUM SERPL-MCNC: 8.3 MG/DL (ref 8.5–10.1)
CHLORIDE SERPL-SCNC: 112 MMOL/L (ref 94–109)
CO2 SERPL-SCNC: 20 MMOL/L (ref 20–32)
CREAT SERPL-MCNC: 0.78 MG/DL (ref 0.66–1.25)
ERYTHROCYTE [DISTWIDTH] IN BLOOD BY AUTOMATED COUNT: 13.6 % (ref 10–15)
GFR SERPL CREATININE-BSD FRML MDRD: >90 ML/MIN/{1.73_M2}
GLUCOSE SERPL-MCNC: 140 MG/DL (ref 70–99)
HCT VFR BLD AUTO: 27.8 % (ref 40–53)
HGB BLD-MCNC: 9.6 G/DL (ref 13.3–17.7)
MCH RBC QN AUTO: 30.5 PG (ref 26.5–33)
MCHC RBC AUTO-ENTMCNC: 34.5 G/DL (ref 31.5–36.5)
MCV RBC AUTO: 88 FL (ref 78–100)
PHOSPHATE SERPL-MCNC: 3.4 MG/DL (ref 2.5–4.5)
PLATELET # BLD AUTO: 161 10E9/L (ref 150–450)
POTASSIUM SERPL-SCNC: 5 MMOL/L (ref 3.4–5.3)
PROT SERPL-MCNC: 6.8 G/DL (ref 6.8–8.8)
RBC # BLD AUTO: 3.15 10E12/L (ref 4.4–5.9)
SODIUM SERPL-SCNC: 139 MMOL/L (ref 133–144)
WBC # BLD AUTO: 3.2 10E9/L (ref 4–11)

## 2019-02-16 PROCEDURE — 84100 ASSAY OF PHOSPHORUS: CPT | Performed by: TRANSPLANT SURGERY

## 2019-02-16 PROCEDURE — 85027 COMPLETE CBC AUTOMATED: CPT | Performed by: TRANSPLANT SURGERY

## 2019-02-16 PROCEDURE — 80048 BASIC METABOLIC PNL TOTAL CA: CPT | Performed by: TRANSPLANT SURGERY

## 2019-02-16 PROCEDURE — 80197 ASSAY OF TACROLIMUS: CPT | Performed by: TRANSPLANT SURGERY

## 2019-02-16 PROCEDURE — 80076 HEPATIC FUNCTION PANEL: CPT | Performed by: TRANSPLANT SURGERY

## 2019-02-17 ENCOUNTER — ANESTHESIA EVENT (OUTPATIENT)
Dept: SURGERY | Facility: CLINIC | Age: 64
End: 2019-02-17
Payer: MEDICARE

## 2019-02-17 LAB
TACROLIMUS BLD-MCNC: 12 UG/L (ref 5–15)
TME LAST DOSE: NORMAL H

## 2019-02-17 ASSESSMENT — ENCOUNTER SYMPTOMS: DYSRHYTHMIAS: 1

## 2019-02-17 NOTE — ANESTHESIA PREPROCEDURE EVALUATION
Anesthesia Pre-Procedure Evaluation    Patient: Loy Limon   MRN:     [id number] Gender:   male   Age:    63 year old :      1955        Preoperative Diagnosis: Bile Duct Stenosis   Procedure(s):  Endoscopic Retrograde Cholangiopancreatogram     Past Medical History:   Diagnosis Date     Biliary stricture of transplanted liver (H) 2018     Cancer (H)     hepatocellualr carcinoma     Cirrhosis of liver (H) 2018     Enlarged prostate      Inguinal hernia     Repaired with mesh on 18     Liver lesion 2018     Liver transplanted (H) 2018     donor liver transplant     Portal vein thrombosis     on path explant     Postoperative atrial fibrillation (H) 2018     Pre-diabetes 2018      Past Surgical History:   Procedure Laterality Date     APPENDECTOMY       COLONOSCOPY           ENDOSCOPIC RETROGRADE CHOLANGIOPANCREATOGRAM N/A 2018    Procedure: ENDOSCOPIC RETROGRADE CHOLANGIOPANCREATOGRAM, with Billary Sphincterotomy and Billary Stent Placement;  Surgeon: Omero Lawler MD;  Location: [facility] OR     HERNIORRHAPHY INGUINAL  2018    Procedure: Herniorrhaphy inguinal;  Surgeon: Emil Echevarria MD;  Location: [facility] OR     IR LIVER BIOPSY PERCUTANEOUS  2018     TRANSPLANT LIVER RECIPIENT  DONOR N/A 2018    Procedure: TRANSPLANT LIVER RECIPIENT  DONOR;  Surgeon: Emil Echevarria MD;  Location: [facility] OR          Anesthesia Evaluation     . Pt has had prior anesthetic. Type: General    No history of anesthetic complications          ROS/MED HX    ENT/Pulmonary:  - neg pulmonary ROS     Neurologic:  - neg neurologic ROS     Cardiovascular:     (+) hypertension----. : . . . :. dysrhythmias (post-op) a-fib and a-flutter, . Previous cardiac testing Echodate:18results:Global and regional left ventricular function is normal with an EF of 60-65%.Right ventricular function, chamber size, wall motion, and thickness  are  normal.Severe left atrial enlargement is present.  The inferior vena cava is normal.  No pericardial effusion is present.date: results:ECG reviewed date:12/25/18 + 2/18/19 results:2/18/19 SB at 47. NSST    12/25/18: Atrial fibrillation with rapid ventricular response=138  ST & T wave abnormality, consider anterolateral ischemia  When compared with ECG of 24-DEC-2018 23:59,  Atrial fibrillation has replaced Atrial flutter  T wave inversion more evident in Anterolateral leads date: results:          METS/Exercise Tolerance:  >4 METS   Hematologic:     (+) Anemia, -     (-) History of Transfusion   Musculoskeletal:  - neg musculoskeletal ROS       GI/Hepatic: Comment: S/p liver transplant on 12/22/2018.     (+) liver disease,       Renal/Genitourinary:     (+) BPH,       Endo:     (+) type II DM (pre-diabetes  XXI=668) Not using insulin .      Psychiatric:  - neg psychiatric ROS       Infectious Disease:  - neg infectious disease ROS       Malignancy:   (+) Malignancy (hepatocellular CA)           Other: Comment: immunosuppressed   - neg other ROS                     PHYSICAL EXAM:   Mental Status/Neuro: A/A/O   Airway: Facies: Feasible  Mallampati: II  Mouth/Opening: Full  TM distance: > 6 cm  Neck ROM: Full   Respiratory: Auscultation: CTAB     Resp. Rate: Normal     Resp. Effort: Normal      CV: Rhythm: Regular  Rate: Ramon  Heart: Normal Sounds   Comments:      Dental: Normal                  Lab Results   Component Value Date    WBC 3.2 (L) 02/16/2019    HGB 9.6 (L) 02/16/2019    HCT 27.8 (L) 02/16/2019     02/16/2019     02/16/2019    POTASSIUM 5.0 02/16/2019    CHLORIDE 112 (H) 02/16/2019    CO2 20 02/16/2019    BUN 23 02/16/2019    CR 0.78 02/16/2019     (H) 02/16/2019    YELENA 8.3 (L) 02/16/2019    PHOS 3.4 02/16/2019    MAG 1.9 02/12/2019    ALBUMIN 3.5 02/16/2019    PROTTOTAL 6.8 02/16/2019    ALT 15 02/16/2019    AST 5 02/16/2019    ALKPHOS 197 (H) 02/16/2019    BILITOTAL 0.6  "02/16/2019    LIPASE 93 12/23/2018    AMYLASE 46 12/23/2018    PTT 40 (H) 12/22/2018    INR 1.14 12/31/2018    FIBR 220 12/23/2018    TSH 6.36 (H) 07/19/2018    T4 1.04 07/19/2018       Preop Vitals  BP Readings from Last 3 Encounters:   02/11/19 122/53   02/08/19 123/53   02/05/19 119/56    Pulse Readings from Last 3 Encounters:   02/11/19 56   02/08/19 52   02/05/19 51      Resp Readings from Last 3 Encounters:   02/08/19 20   02/04/19 18   01/07/19 16    SpO2 Readings from Last 3 Encounters:   02/08/19 98%   02/05/19 97%   01/14/19 97%      Temp Readings from Last 1 Encounters:   02/05/19 36.5  C (97.7  F) (Oral)    Ht Readings from Last 1 Encounters:   02/11/19 1.702 m (5' 7\")      Wt Readings from Last 1 Encounters:   02/11/19 79.2 kg (174 lb 8 oz)    Estimated body mass index is 27.33 kg/m  as calculated from the following:    Height as of 2/11/19: 1.702 m (5' 7\").    Weight as of 2/11/19: 79.2 kg (174 lb 8 oz).     LDA:  Pulmonary Artery Catheter Assessment - Single Lumen 12/22/18 0908 (Active)   Number of days: 57       Closed/Suction Drain 1 Right;Ventral RLQ Bulb 19 Divehi (Active)   Site Description WDL 1/7/2019 11:02 AM   Dressing Status Normal: Clean, Dry & Intact;Other (comment) 1/7/2019 11:02 AM   Dressing Change Due 01/07/19 1/7/2019 11:02 AM   Drainage Appearance Yellow 1/7/2019 11:02 AM   Status To bulb suction 1/7/2019 11:02 AM   Output (ml) 15 ml 1/7/2019 11:02 AM   Number of days: 57            Assessment:   ASA SCORE: 3    NPO Status: > 6 hours since completed Solid Foods   Documentation: H&P complete; Preop Testing complete; Consents complete   Proceeding: Proceed without further delay  Tobacco Use:  Active user of Tobacco     Plan:   Anes. Type:  General      Induction:  IV (Standard)   Airway: Oral ETT   Access/Monitoring: PIV   Maintenance: Balanced   Emergence: Procedure Site   Logistics: Same Day Surgery     Postop Pain/Sedation Strategy:  Standard-Options: Opioids PRN     PONV " Management:  Adult Risk Factors:, Postop Opioids  Prevention: Ondansetron     CONSENT: Direct conversation;  in Room   Plan and risks discussed with: Patient   Blood Products: Consent Deferred (Minimal Blood Loss)       Comments for Plan/Consent:  Pt is on metroprolol bid, took pm dose not am dose.  Not given today b/c HR=47.  Will give IV prn                         Patito Bejarano MD

## 2019-02-18 ENCOUNTER — OFFICE VISIT (OUTPATIENT)
Dept: INTERPRETER SERVICES | Facility: CLINIC | Age: 64
End: 2019-02-18
Payer: MEDICARE

## 2019-02-18 ENCOUNTER — ANESTHESIA (OUTPATIENT)
Dept: SURGERY | Facility: CLINIC | Age: 64
End: 2019-02-18
Payer: MEDICARE

## 2019-02-18 ENCOUNTER — TELEPHONE (OUTPATIENT)
Dept: TRANSPLANT | Facility: CLINIC | Age: 64
End: 2019-02-18

## 2019-02-18 ENCOUNTER — APPOINTMENT (OUTPATIENT)
Dept: GENERAL RADIOLOGY | Facility: CLINIC | Age: 64
End: 2019-02-18
Attending: INTERNAL MEDICINE
Payer: MEDICARE

## 2019-02-18 ENCOUNTER — HOSPITAL ENCOUNTER (OUTPATIENT)
Facility: CLINIC | Age: 64
Discharge: HOME | End: 2019-02-18
Attending: INTERNAL MEDICINE | Admitting: INTERNAL MEDICINE
Payer: MEDICARE

## 2019-02-18 VITALS
HEART RATE: 48 BPM | TEMPERATURE: 98 F | SYSTOLIC BLOOD PRESSURE: 143 MMHG | BODY MASS INDEX: 26.89 KG/M2 | RESPIRATION RATE: 16 BRPM | HEIGHT: 67 IN | WEIGHT: 171.3 LBS | OXYGEN SATURATION: 97 % | DIASTOLIC BLOOD PRESSURE: 75 MMHG

## 2019-02-18 DIAGNOSIS — K83.1 BILIARY STRICTURE OF TRANSPLANTED LIVER (H): Primary | ICD-10-CM

## 2019-02-18 DIAGNOSIS — T86.49 BILIARY STRICTURE OF TRANSPLANTED LIVER (H): Primary | ICD-10-CM

## 2019-02-18 LAB
ALBUMIN SERPL-MCNC: 3.6 G/DL (ref 3.4–5)
ALP SERPL-CCNC: 191 U/L (ref 40–150)
ALT SERPL W P-5'-P-CCNC: 14 U/L (ref 0–70)
AMYLASE SERPL-CCNC: 38 U/L (ref 30–110)
ANION GAP SERPL CALCULATED.3IONS-SCNC: 4 MMOL/L (ref 3–14)
AST SERPL W P-5'-P-CCNC: 6 U/L (ref 0–45)
BILIRUB SERPL-MCNC: 0.7 MG/DL (ref 0.2–1.3)
BUN SERPL-MCNC: 20 MG/DL (ref 7–30)
CALCIUM SERPL-MCNC: 8.5 MG/DL (ref 8.5–10.1)
CHLORIDE SERPL-SCNC: 110 MMOL/L (ref 94–109)
CO2 SERPL-SCNC: 24 MMOL/L (ref 20–32)
CREAT SERPL-MCNC: 0.86 MG/DL (ref 0.66–1.25)
ERCP: NORMAL
GFR SERPL CREATININE-BSD FRML MDRD: >90 ML/MIN/{1.73_M2}
GLUCOSE BLDC GLUCOMTR-MCNC: 142 MG/DL (ref 70–99)
GLUCOSE BLDC GLUCOMTR-MCNC: 180 MG/DL (ref 70–99)
GLUCOSE SERPL-MCNC: 148 MG/DL (ref 70–99)
LIPASE SERPL-CCNC: 35 U/L (ref 73–393)
POTASSIUM SERPL-SCNC: 5.1 MMOL/L (ref 3.4–5.3)
PROT SERPL-MCNC: 6.8 G/DL (ref 6.8–8.8)
SODIUM SERPL-SCNC: 138 MMOL/L (ref 133–144)

## 2019-02-18 PROCEDURE — C1726 CATH, BAL DIL, NON-VASCULAR: HCPCS | Performed by: INTERNAL MEDICINE

## 2019-02-18 PROCEDURE — A9270 NON-COVERED ITEM OR SERVICE: HCPCS | Performed by: INTERNAL MEDICINE

## 2019-02-18 PROCEDURE — C1769 GUIDE WIRE: HCPCS | Performed by: INTERNAL MEDICINE

## 2019-02-18 PROCEDURE — 36000059 ZZH SURGERY LEVEL 3 EA 15 ADDTL MIN UMMC: Performed by: INTERNAL MEDICINE

## 2019-02-18 PROCEDURE — 40000171 ZZH STATISTIC PRE-PROCEDURE ASSESSMENT III: Performed by: INTERNAL MEDICINE

## 2019-02-18 PROCEDURE — 36415 COLL VENOUS BLD VENIPUNCTURE: CPT | Performed by: INTERNAL MEDICINE

## 2019-02-18 PROCEDURE — 25800030 ZZH RX IP 258 OP 636: Performed by: INTERNAL MEDICINE

## 2019-02-18 PROCEDURE — 37000009 ZZH ANESTHESIA TECHNICAL FEE, EACH ADDTL 15 MIN: Performed by: INTERNAL MEDICINE

## 2019-02-18 PROCEDURE — C1877 STENT, NON-COAT/COV W/O DEL: HCPCS | Performed by: INTERNAL MEDICINE

## 2019-02-18 PROCEDURE — 71000012 ZZH RECOVERY PHASE 1 LEVEL 1 FIRST HR: Performed by: INTERNAL MEDICINE

## 2019-02-18 PROCEDURE — 25000565 ZZH ISOFLURANE, EA 15 MIN: Performed by: INTERNAL MEDICINE

## 2019-02-18 PROCEDURE — 25000128 H RX IP 250 OP 636: Performed by: NURSE ANESTHETIST, CERTIFIED REGISTERED

## 2019-02-18 PROCEDURE — 37000008 ZZH ANESTHESIA TECHNICAL FEE, 1ST 30 MIN: Performed by: INTERNAL MEDICINE

## 2019-02-18 PROCEDURE — 82150 ASSAY OF AMYLASE: CPT | Performed by: INTERNAL MEDICINE

## 2019-02-18 PROCEDURE — 80053 COMPREHEN METABOLIC PANEL: CPT | Performed by: INTERNAL MEDICINE

## 2019-02-18 PROCEDURE — 27210794 ZZH OR GENERAL SUPPLY STERILE: Performed by: INTERNAL MEDICINE

## 2019-02-18 PROCEDURE — 82962 GLUCOSE BLOOD TEST: CPT

## 2019-02-18 PROCEDURE — 25000125 ZZHC RX 250: Performed by: INTERNAL MEDICINE

## 2019-02-18 PROCEDURE — 25000125 ZZHC RX 250: Performed by: NURSE ANESTHETIST, CERTIFIED REGISTERED

## 2019-02-18 PROCEDURE — 93010 ELECTROCARDIOGRAM REPORT: CPT | Performed by: INTERNAL MEDICINE

## 2019-02-18 PROCEDURE — 83690 ASSAY OF LIPASE: CPT | Performed by: INTERNAL MEDICINE

## 2019-02-18 PROCEDURE — 25500064 ZZH RX 255 OP 636: Performed by: INTERNAL MEDICINE

## 2019-02-18 PROCEDURE — T1013 SIGN LANG/ORAL INTERPRETER: HCPCS | Mod: U3

## 2019-02-18 PROCEDURE — 71000027 ZZH RECOVERY PHASE 2 EACH 15 MINS: Performed by: INTERNAL MEDICINE

## 2019-02-18 PROCEDURE — 40000277 XR SURGERY CARM FLUORO LESS THAN 5 MIN W STILLS: Mod: TC

## 2019-02-18 PROCEDURE — 36000061 ZZH SURGERY LEVEL 3 W FLUORO 1ST 30 MIN - UMMC: Performed by: INTERNAL MEDICINE

## 2019-02-18 PROCEDURE — 40000065 ZZH STATISTIC EKG NON-CHARGEABLE

## 2019-02-18 DEVICE — STENT ZIMMON PANCREA 7FRX09CM SGL PIGTAIL G22456
Type: IMPLANTABLE DEVICE | Site: BILE DUCT | Status: NON-FUNCTIONAL
Removed: 2019-04-29

## 2019-02-18 RX ORDER — HYDRALAZINE HYDROCHLORIDE 20 MG/ML
2.5-5 INJECTION INTRAMUSCULAR; INTRAVENOUS EVERY 10 MIN PRN
Status: DISCONTINUED | OUTPATIENT
Start: 2019-02-18 | End: 2019-02-18 | Stop reason: HOSPADM

## 2019-02-18 RX ORDER — ONDANSETRON 2 MG/ML
4 INJECTION INTRAMUSCULAR; INTRAVENOUS EVERY 30 MIN PRN
Status: DISCONTINUED | OUTPATIENT
Start: 2019-02-18 | End: 2019-02-18 | Stop reason: HOSPADM

## 2019-02-18 RX ORDER — ONDANSETRON 4 MG/1
4 TABLET, ORALLY DISINTEGRATING ORAL EVERY 30 MIN PRN
Status: DISCONTINUED | OUTPATIENT
Start: 2019-02-18 | End: 2019-02-18 | Stop reason: HOSPADM

## 2019-02-18 RX ORDER — INDOMETHACIN 50 MG/1
100 SUPPOSITORY RECTAL
Status: DISCONTINUED | OUTPATIENT
Start: 2019-02-18 | End: 2019-02-18 | Stop reason: HOSPADM

## 2019-02-18 RX ORDER — PROPOFOL 10 MG/ML
INJECTION, EMULSION INTRAVENOUS PRN
Status: DISCONTINUED | OUTPATIENT
Start: 2019-02-18 | End: 2019-02-18

## 2019-02-18 RX ORDER — FENTANYL CITRATE 50 UG/ML
25-50 INJECTION, SOLUTION INTRAMUSCULAR; INTRAVENOUS
Status: DISCONTINUED | OUTPATIENT
Start: 2019-02-18 | End: 2019-02-18 | Stop reason: HOSPADM

## 2019-02-18 RX ORDER — MEPERIDINE HYDROCHLORIDE 50 MG/ML
12.5 INJECTION INTRAMUSCULAR; INTRAVENOUS; SUBCUTANEOUS
Status: DISCONTINUED | OUTPATIENT
Start: 2019-02-18 | End: 2019-02-18 | Stop reason: HOSPADM

## 2019-02-18 RX ORDER — IOPAMIDOL 510 MG/ML
INJECTION, SOLUTION INTRAVASCULAR PRN
Status: DISCONTINUED | OUTPATIENT
Start: 2019-02-18 | End: 2019-02-18 | Stop reason: HOSPADM

## 2019-02-18 RX ORDER — HYDROMORPHONE HYDROCHLORIDE 1 MG/ML
.3-.5 INJECTION, SOLUTION INTRAMUSCULAR; INTRAVENOUS; SUBCUTANEOUS EVERY 10 MIN PRN
Status: DISCONTINUED | OUTPATIENT
Start: 2019-02-18 | End: 2019-02-18 | Stop reason: HOSPADM

## 2019-02-18 RX ORDER — SODIUM CHLORIDE, SODIUM LACTATE, POTASSIUM CHLORIDE, CALCIUM CHLORIDE 600; 310; 30; 20 MG/100ML; MG/100ML; MG/100ML; MG/100ML
INJECTION, SOLUTION INTRAVENOUS CONTINUOUS
Status: DISCONTINUED | OUTPATIENT
Start: 2019-02-18 | End: 2019-02-18 | Stop reason: HOSPADM

## 2019-02-18 RX ORDER — FLUMAZENIL 0.1 MG/ML
0.2 INJECTION, SOLUTION INTRAVENOUS
Status: DISCONTINUED | OUTPATIENT
Start: 2019-02-18 | End: 2019-02-18 | Stop reason: HOSPADM

## 2019-02-18 RX ORDER — LIDOCAINE 40 MG/G
CREAM TOPICAL
Status: DISCONTINUED | OUTPATIENT
Start: 2019-02-18 | End: 2019-02-18 | Stop reason: HOSPADM

## 2019-02-18 RX ORDER — ALBUTEROL SULFATE 0.83 MG/ML
2.5 SOLUTION RESPIRATORY (INHALATION) EVERY 4 HOURS PRN
Status: DISCONTINUED | OUTPATIENT
Start: 2019-02-18 | End: 2019-02-18 | Stop reason: HOSPADM

## 2019-02-18 RX ORDER — CIPROFLOXACIN 500 MG/1
500 TABLET, FILM COATED ORAL 2 TIMES DAILY
Qty: 10 TABLET | Refills: 0 | Status: SHIPPED | OUTPATIENT
Start: 2019-02-18 | End: 2019-04-10

## 2019-02-18 RX ORDER — INDOMETHACIN 50 MG/1
100 SUPPOSITORY RECTAL
Status: COMPLETED | OUTPATIENT
Start: 2019-02-18 | End: 2019-02-18

## 2019-02-18 RX ORDER — NALOXONE HYDROCHLORIDE 0.4 MG/ML
.1-.4 INJECTION, SOLUTION INTRAMUSCULAR; INTRAVENOUS; SUBCUTANEOUS
Status: DISCONTINUED | OUTPATIENT
Start: 2019-02-18 | End: 2019-02-18 | Stop reason: HOSPADM

## 2019-02-18 RX ORDER — ONDANSETRON 2 MG/ML
INJECTION INTRAMUSCULAR; INTRAVENOUS PRN
Status: DISCONTINUED | OUTPATIENT
Start: 2019-02-18 | End: 2019-02-18

## 2019-02-18 RX ORDER — CIPROFLOXACIN 2 MG/ML
INJECTION, SOLUTION INTRAVENOUS PRN
Status: DISCONTINUED | OUTPATIENT
Start: 2019-02-18 | End: 2019-02-18

## 2019-02-18 RX ORDER — FENTANYL CITRATE 50 UG/ML
INJECTION, SOLUTION INTRAMUSCULAR; INTRAVENOUS PRN
Status: DISCONTINUED | OUTPATIENT
Start: 2019-02-18 | End: 2019-02-18

## 2019-02-18 RX ORDER — DIMENHYDRINATE 50 MG/ML
25 INJECTION, SOLUTION INTRAMUSCULAR; INTRAVENOUS
Status: DISCONTINUED | OUTPATIENT
Start: 2019-02-18 | End: 2019-02-18 | Stop reason: HOSPADM

## 2019-02-18 RX ORDER — METOPROLOL TARTRATE 1 MG/ML
1-2 INJECTION, SOLUTION INTRAVENOUS EVERY 5 MIN PRN
Status: DISCONTINUED | OUTPATIENT
Start: 2019-02-18 | End: 2019-02-18 | Stop reason: HOSPADM

## 2019-02-18 RX ORDER — LIDOCAINE HYDROCHLORIDE 20 MG/ML
INJECTION, SOLUTION INFILTRATION; PERINEURAL PRN
Status: DISCONTINUED | OUTPATIENT
Start: 2019-02-18 | End: 2019-02-18

## 2019-02-18 RX ADMIN — CIPROFLOXACIN 400 MG: 2 INJECTION INTRAVENOUS at 09:40

## 2019-02-18 RX ADMIN — PROPOFOL 150 MG: 10 INJECTION, EMULSION INTRAVENOUS at 09:26

## 2019-02-18 RX ADMIN — SODIUM CHLORIDE, POTASSIUM CHLORIDE, SODIUM LACTATE AND CALCIUM CHLORIDE: 600; 310; 30; 20 INJECTION, SOLUTION INTRAVENOUS at 09:11

## 2019-02-18 RX ADMIN — ROCURONIUM BROMIDE 50 MG: 10 INJECTION INTRAVENOUS at 09:26

## 2019-02-18 RX ADMIN — SUGAMMADEX 200 MG: 100 INJECTION, SOLUTION INTRAVENOUS at 10:06

## 2019-02-18 RX ADMIN — FENTANYL CITRATE 50 MCG: 50 INJECTION, SOLUTION INTRAMUSCULAR; INTRAVENOUS at 09:26

## 2019-02-18 RX ADMIN — LIDOCAINE HYDROCHLORIDE 80 MG: 20 INJECTION, SOLUTION INFILTRATION; PERINEURAL at 09:26

## 2019-02-18 RX ADMIN — MIDAZOLAM 1 MG: 1 INJECTION INTRAMUSCULAR; INTRAVENOUS at 09:17

## 2019-02-18 RX ADMIN — ONDANSETRON 4 MG: 2 INJECTION INTRAMUSCULAR; INTRAVENOUS at 09:56

## 2019-02-18 ASSESSMENT — MIFFLIN-ST. JEOR: SCORE: 1520.63

## 2019-02-18 NOTE — ANESTHESIA CARE TRANSFER NOTE
Patient: Loy Limon    Procedure(s):  COMBINED ENDOSCOPIC RETROGRADE CHOLANGIOPANCREATOGRAPHY, Bile Duct Stent Exchange    Diagnosis: Bile Duct Stenosis  Diagnosis Additional Information: No value filed.    Anesthesia Type:   No value filed.     Note:  Airway :Face Mask  Patient transferred to:PACU  Comments: Pt awake, responding appropriately.  schedule to be present at 1045, RN able to communicate with pt at bedside. Report to RN.Handoff Report: Identifed the Patient, Identified the Reponsible Provider, Reviewed the pertinent medical history, Discussed the surgical course, Reviewed Intra-OP anesthesia mangement and issues during anesthesia, Set expectations for post-procedure period and Allowed opportunity for questions and acknowledgement of understanding      Vitals: (Last set prior to Anesthesia Care Transfer)    CRNA VITALS  2/18/2019 0942 - 2/18/2019 1023      2/18/2019             EKG:  Sinus bradycardia                Electronically Signed By: EZRA Aviles CRNA  February 18, 2019  10:23 AM

## 2019-02-18 NOTE — TELEPHONE ENCOUNTER
Provider Call: General  Route to LPN    Reason for call: LM on POD VM- 2/16/  HHN talked with pt and he told her the pharmacy told him to stop his ASA  But her was not told bye latha Prabhakar to stop it  They just need clarification if he should stay on it    Call back needed? Yes    Return Call Needed  Same as documented in contacts section  When to return call?: Greater than one day: Route standard priority

## 2019-02-18 NOTE — OR NURSING
Gave message to circulator nurse to tell Dr. Lawler in surgery, that the pt did not stop his ASA for surgery.

## 2019-02-18 NOTE — OP NOTE
ERCP 02/18/2019  9:19 AM [facility], [address] [place] [address] [phone]     Endoscopy Department   _______________________________________________________________________________   Patient Name: Loy Limon         Procedure Date: 2/18/2019 9:19 AM   MRN: [id number]                       Account Number: [id number]   YOB: 1955              Admit Type: Outpatient   Age: 63                               Room: OR   Gender: Male                          Note Status: Finalized   Attending MD: Omero Lawler MD   Total Sedation Time:   _______________________________________________________________________________       Procedure:           ERCP   Indications:         Stent change, Post liver transplant assessment, Bile                        duct stricture   Providers:           Omero Lawler MD   Patient Profile:     Mr Limon is a 64yo gentleman with a history of cirrhosis                        complicated by HCC status post DDLT found to have duct                        to duct stenosis for which we performed an ERCP with                        stent placement. He has done well in the interval and                        returns for repeat management and interrogation by ERCP.   Referring MD:        Shaila De La Cruz MD   Medicines:           General Anesthesia, Cipro 400 mg IV, Indomethacin 100 mg                        GA   Complications:       No immediate complications.   _______________________________________________________________________________   Procedure:           Pre-Anesthesia Assessment:                        - Prior to the procedure, a History and Physical was                        performed, and patient medications and allergies were                        reviewed. The patient is competent. The risks and                        benefits of the procedure and the sedation options and                        risks were discussed  with the patient. All questions                        were answered and informed consent was obtained. Patient                        identification and proposed procedure were verified by                        the nurse in the pre-procedure area. Mental Status                        Examination: alert and oriented. Airway Examination:                        Mallampati Class II (the uvula but not tonsillar pillars                        visualized). Respiratory Examination: clear to                        auscultation. CV Examination: normal. ASA Grade                        Assessment: II - A patient with mild systemic disease.                        After reviewing the risks and benefits, the patient was                        deemed in satisfactory condition to undergo the                        procedure. The anesthesia plan was to use general                        anesthesia. Immediately prior to administration of                        medications, the patient was re-assessed for adequacy to                        receive sedatives. The heart rate, respiratory rate,                        oxygen saturations, blood pressure, adequacy of                        pulmonary ventilation, and response to care were                        monitored throughout the procedure. The physical status                        of the patient was re-assessed after the procedure.                        After obtaining informed consent, the scope was passed                        under direct vision. Throughout the procedure, the                        patient's blood pressure, pulse, and oxygen saturations                        were monitored continuously. The duodenoscope was                        introduced through the mouth, and used to inject                        contrast into and used to inject contrast into the bile                        duct. The ERCP was accomplished without difficulty. The                        " patient tolerated the procedure well.                                                                                     Findings:        The patient was prone. A  film of the right upper quadrant        demonstrated the biliary stent. Limited white light views of the foregut        found the biliary stent mostly patent across a modest biliary        sphincterotomy. There was a periampullary diverticulum. The stent was        removed from the biliary tree using a snare. The bile duct was deeply        cannulated with the 12 mm balloon in concert with an 0.025\" Visiglide        wire. Contrast was injected and I personally interpreted the bile duct        images. Ductal flow of contrast was adequate, image quality was        excellent and contrast extended throughout the intrahepatic ducts. The        intrahepatics were decompressed and grossly healthy appearing as were        the bifrucation and donor common. There was a persistent, though        improved anastomotic stenosis, and the recipient common was unremarable        without filling defect or significant mismatch. To discover objects, the        biliary tree was swept with a 12 mm balloon starting at the upper third        of the common duct. Nothing but contrast and bile were recovered. Three        7 Fr by 9 cm Zimmon stents with a single external pigtail and a single        internal flap were placed 9 cm into the common bile duct. Bile flowed        through the stents and te stents were in good position. The ventral        pancreatic duct and orifice were selectively not interrogated.                                                                                     Impression:          - Uncomplicated removal of well posistion, partially                        occluded biliary stent from a patent biliary                        sphincterotomy                        - Periampullar diverticulum                        - Persistent though improved " biliary anastomotic                        stenosis again managed by stent placement, now with 21F                        overall                        - Grossly healthy appearing transplanted intrahepatics   Recommendation:      - Standard outpatient general anesthesia recovery with                        probable discharge home this morning                        - Complete a short course of antibiotic as prescribed                        - Antithrombotics may continue/reinitiate immediately                        - Repeat ERCP in 10w with probable stent free trail to                        follow                        - The findings and recommendations were discussed with                        the patient and their family                                                                                       electronically signed by ASHLY Lawler

## 2019-02-18 NOTE — ANESTHESIA POSTPROCEDURE EVALUATION
Anesthesia POST Procedure Evaluation    Patient: Loy Limon   MRN:     [id number] Gender:   male   Age:    63 year old :      1955        Preoperative Diagnosis: Bile Duct Stenosis   Procedure(s):  COMBINED ENDOSCOPIC RETROGRADE CHOLANGIOPANCREATOGRAPHY, Bile Duct Stent Exchange   Postop Comments: No value filed.       Anesthesia Type:  General    Reportable Event: NO     PAIN: Uncomplicated   Sign Out status: Comfortable, Well controlled pain     PONV: No PONV   Sign Out status:  No Nausea or Vomiting     Neuro/Psych: Uneventful perioperative course   Sign Out Status: Preoperative baseline; Age appropriate mentation     Airway/Resp.: Uneventful perioperative course   Sign Out Status: Non labored breathing, age appropriate RR; Resp. Status within EXPECTED Parameters     CV: Uneventful perioperative course   Sign Out status: Appropriate BP and perfusion indices; Appropriate HR/Rhythm     Disposition:   Sign Out in:  PACU  Disposition:  Phase II; Home  Recovery Course: Uneventful  Follow-Up: Not required           Last Anesthesia Record Vitals:  CRNA VITALS  2019 0942 - 2019 1042      2019             EKG:  Sinus bradycardia          Last PACU/Preop Vitals:  Vitals:    19 0723 19 1017 19 1030   BP: 132/63 153/70 153/70   Pulse: 50 52 (!) 47   Resp: 18 16 16   Temp: 36.6  C (97.9  F) 36  C (96.8  F) 35.8  C (96.4  F)   SpO2: 97% 100% 100%         Electronically Signed By: Patito Bejarano MD, 2019, 10:59 AM

## 2019-02-18 NOTE — DISCHARGE INSTRUCTIONS
[facility]  Same-Day Surgery   Adult Discharge Orders & Instructions     For 24 hours after surgery    1. Get plenty of rest.  A responsible adult must stay with you for at least 24 hours after you leave the hospital.   2. Do not drive or use heavy equipment.  If you have weakness or tingling, don't drive or use heavy equipment until this feeling goes away.  3. Do not drink alcohol.  4. Avoid strenuous or risky activities.  Ask for help when climbing stairs.   5. You may feel lightheaded.  IF so, sit for a few minutes before standing.  Have someone help you get up.   6. If you have nausea (feel sick to your stomach): Drink only clear liquids such as apple juice, ginger ale, broth or 7-Up.  Rest may also help.  Be sure to drink enough fluids.  Move to a regular diet as you feel able.  7. You may have a slight fever. Call the doctor if your fever is over 100 F (37.7 C) (taken under the tongue) or lasts longer than 24 hours.  8. You may have a dry mouth, a sore throat, muscle aches or trouble sleeping.  These should go away after 24 hours.  9. Do not make important or legal decisions.   Call your doctor for any of the followin.  Signs of infection (fever, growing tenderness at the surgery site, a large amount of drainage or bleeding, severe pain, foul-smelling drainage, redness, swelling).    2. It has been over 8 to 10 hours since surgery and you are still not able to urinate (pass water).    3.  Headache for over 24 hours.    To contact a doctor, call Dr Lawler at [phone] (clinic) or:        [phone] and ask for the resident on call for gastroenterology or GI (answered 24 hours a day)      Emergency Department:    [facility]: [phone]       (TTY for hearing impaired: [phone])      [facility]  Cirugía del mismo día órdenes de descarga para adultos y instrucciones nancy 24 horas después de la  cirugía    Descansa mucho.  Un adulto responsable debe permanecer con usted nancy al menos 24 horas después de salir del hospital.   No conduzca ni utilice equipos pesados.  Si tiene debilidad u hormigueo, no conduzca ni use equipo pesado hasta que jaimie sentimiento desaparece.  No milind alcohol.  Evite actividades extenuantes o riesgosas.  Pide ayuda al subir escaleras.   Puede sentirse aturdido.  Si es así, siéntese unos minutos antes de pararse.  Que alguien te ayude a levantarme.   Si tiene náuseas (malestar estomacal): milind solamente líquidos abhishek munira jugo de manzana, Ginger Ale, caldo o 7-up.  El descanso también puede ayudar.  Asegúrese de beber suficientes líquidos.  Muévete a dyana dieta regular munira te sientas capaz.  Puede tener dyana leve fiebre. Llame al médico si samaniego fiebre es superior a 100   f (37.7   c) (tomada debajo de la lengua) o dura más de 24 horas.  Usted puede tener dyana boca seca, dolor de garganta, ngozi musculares oapuros dormir.  Estos deben desaparecer después de 24 horas.  No tome decisiones importantes o legales.   Llame a samaniego médico para cualquiera de los siguientes:  1. signos de infección (fiebre, sensibilidad creciente en el lugar de la cirugía, dyana gran cantidad de drenaje o sangrado, dolor severo, drenaje maloliente, enrojecimiento, hinchazón).  2. ha pasado más de 8 a 10 horas desde la cirugía y usted todavía no puede orinar (pase el agua).  3. dolor de suresh por más de 24 horas.    Para comunicarse con un médico, llame al Dr. Lawler al [phone] (clínica) o:  ' [phone] y pregunte por el residente en la llamada para el Gastroenterología o el GI (respondido 24 horas al día)  ' Departamento de emergencias:  [facility]: [phone]       (TTY para personas con discapacidad auditiva: [phone])

## 2019-02-18 NOTE — TELEPHONE ENCOUNTER
This call was from the ERCP team, they had suggested he stop the asa prior to ERCP.  He has had his ERCP today.

## 2019-02-19 ENCOUNTER — MEDICAL CORRESPONDENCE (OUTPATIENT)
Dept: HEALTH INFORMATION MANAGEMENT | Facility: CLINIC | Age: 64
End: 2019-02-19

## 2019-02-20 ENCOUNTER — OFFICE VISIT (OUTPATIENT)
Dept: DERMATOLOGY | Facility: CLINIC | Age: 64
End: 2019-02-20
Attending: INTERNAL MEDICINE
Payer: MEDICARE

## 2019-02-20 ENCOUNTER — OFFICE VISIT (OUTPATIENT)
Dept: GASTROENTEROLOGY | Facility: CLINIC | Age: 64
End: 2019-02-20
Attending: INTERNAL MEDICINE
Payer: MEDICARE

## 2019-02-20 VITALS
WEIGHT: 172 LBS | TEMPERATURE: 98 F | HEIGHT: 67 IN | SYSTOLIC BLOOD PRESSURE: 131 MMHG | DIASTOLIC BLOOD PRESSURE: 73 MMHG | BODY MASS INDEX: 27 KG/M2 | OXYGEN SATURATION: 97 % | HEART RATE: 50 BPM

## 2019-02-20 VITALS — HEART RATE: 49 BPM | DIASTOLIC BLOOD PRESSURE: 68 MMHG | SYSTOLIC BLOOD PRESSURE: 118 MMHG

## 2019-02-20 DIAGNOSIS — Z94.4 LIVER REPLACED BY TRANSPLANT (H): Primary | ICD-10-CM

## 2019-02-20 DIAGNOSIS — Z94.4 HISTORY OF LIVER TRANSPLANT (H): Primary | ICD-10-CM

## 2019-02-20 DIAGNOSIS — T38.0X5A STEROID-INDUCED HYPERGLYCEMIA: ICD-10-CM

## 2019-02-20 DIAGNOSIS — L74.513 HYPERHIDROSIS OF SOLES: ICD-10-CM

## 2019-02-20 DIAGNOSIS — D22.9 MULTIPLE MELANOCYTIC NEVI: ICD-10-CM

## 2019-02-20 DIAGNOSIS — Z94.4 LIVER REPLACED BY TRANSPLANT (H): ICD-10-CM

## 2019-02-20 DIAGNOSIS — L60.8 LONGITUDINAL MELANONYCHIA: ICD-10-CM

## 2019-02-20 DIAGNOSIS — R23.8 SKIN SENSITIVITY: ICD-10-CM

## 2019-02-20 DIAGNOSIS — R73.9 STEROID-INDUCED HYPERGLYCEMIA: ICD-10-CM

## 2019-02-20 LAB
ALBUMIN SERPL-MCNC: 3.6 G/DL (ref 3.4–5)
ALP SERPL-CCNC: 187 U/L (ref 40–150)
ALT SERPL W P-5'-P-CCNC: 13 U/L (ref 0–70)
ANION GAP SERPL CALCULATED.3IONS-SCNC: 6 MMOL/L (ref 3–14)
AST SERPL W P-5'-P-CCNC: 7 U/L (ref 0–45)
BILIRUB DIRECT SERPL-MCNC: 0.3 MG/DL (ref 0–0.2)
BILIRUB SERPL-MCNC: 0.7 MG/DL (ref 0.2–1.3)
BUN SERPL-MCNC: 19 MG/DL (ref 7–30)
CALCIUM SERPL-MCNC: 8.4 MG/DL (ref 8.5–10.1)
CHLORIDE SERPL-SCNC: 107 MMOL/L (ref 94–109)
CO2 SERPL-SCNC: 24 MMOL/L (ref 20–32)
CREAT SERPL-MCNC: 0.87 MG/DL (ref 0.66–1.25)
CREAT UR-MCNC: 139 MG/DL
ERYTHROCYTE [DISTWIDTH] IN BLOOD BY AUTOMATED COUNT: 13.3 % (ref 10–15)
GFR SERPL CREATININE-BSD FRML MDRD: >90 ML/MIN/{1.73_M2}
GLUCOSE SERPL-MCNC: 158 MG/DL (ref 70–99)
HCT VFR BLD AUTO: 29.9 % (ref 40–53)
HGB BLD-MCNC: 9.6 G/DL (ref 13.3–17.7)
INTERPRETATION ECG - MUSE: NORMAL
MAGNESIUM SERPL-MCNC: 1.8 MG/DL (ref 1.6–2.3)
MCH RBC QN AUTO: 29.1 PG (ref 26.5–33)
MCHC RBC AUTO-ENTMCNC: 32.1 G/DL (ref 31.5–36.5)
MCV RBC AUTO: 91 FL (ref 78–100)
MICROALBUMIN UR-MCNC: 21 MG/L
MICROALBUMIN/CREAT UR: 15.4 MG/G CR (ref 0–17)
PHOSPHATE SERPL-MCNC: 3.7 MG/DL (ref 2.5–4.5)
PLATELET # BLD AUTO: 152 10E9/L (ref 150–450)
POTASSIUM SERPL-SCNC: 5.2 MMOL/L (ref 3.4–5.3)
PROT SERPL-MCNC: 6.5 G/DL (ref 6.8–8.8)
RBC # BLD AUTO: 3.3 10E12/L (ref 4.4–5.9)
SODIUM SERPL-SCNC: 136 MMOL/L (ref 133–144)
TACROLIMUS BLD-MCNC: 9.4 UG/L (ref 5–15)
TME LAST DOSE: NORMAL H
WBC # BLD AUTO: 3.3 10E9/L (ref 4–11)

## 2019-02-20 PROCEDURE — G0463 HOSPITAL OUTPT CLINIC VISIT: HCPCS | Mod: ZF

## 2019-02-20 PROCEDURE — 80197 ASSAY OF TACROLIMUS: CPT | Performed by: TRANSPLANT SURGERY

## 2019-02-20 PROCEDURE — 80076 HEPATIC FUNCTION PANEL: CPT | Performed by: TRANSPLANT SURGERY

## 2019-02-20 PROCEDURE — T1013 SIGN LANG/ORAL INTERPRETER: HCPCS | Mod: U3

## 2019-02-20 PROCEDURE — T1013 SIGN LANG/ORAL INTERPRETER: HCPCS | Mod: U3,ZF

## 2019-02-20 PROCEDURE — 36415 COLL VENOUS BLD VENIPUNCTURE: CPT | Performed by: TRANSPLANT SURGERY

## 2019-02-20 PROCEDURE — 84100 ASSAY OF PHOSPHORUS: CPT | Performed by: TRANSPLANT SURGERY

## 2019-02-20 PROCEDURE — 80048 BASIC METABOLIC PNL TOTAL CA: CPT | Performed by: TRANSPLANT SURGERY

## 2019-02-20 PROCEDURE — 85027 COMPLETE CBC AUTOMATED: CPT | Performed by: TRANSPLANT SURGERY

## 2019-02-20 PROCEDURE — 83735 ASSAY OF MAGNESIUM: CPT | Performed by: TRANSPLANT SURGERY

## 2019-02-20 RX ORDER — UREA 200 MG/G
CREAM TOPICAL
Qty: 480 G | Refills: 5 | Status: SHIPPED | OUTPATIENT
Start: 2019-02-20 | End: 2019-12-16

## 2019-02-20 ASSESSMENT — PAIN SCALES - GENERAL
PAINLEVEL: NO PAIN (0)
PAINLEVEL: MODERATE PAIN (4)

## 2019-02-20 ASSESSMENT — MIFFLIN-ST. JEOR: SCORE: 1523.82

## 2019-02-20 NOTE — LETTER
2/20/2019    RE: Loy Limon  [address]  [facility] [place] [address]     [facility]    Hepatology follow-up    CHIEF COMPLAINT/REASON FOR THE VISIT:  Status post liver transplantation for hepatocellular carcinoma.      HISTORY OF PRESENT ILLNESS:  Mr. Limon is a 63-year-old [place] immigrant whom I have seen here for alcoholic liver disease complicated by portal hypertension.  The diagnosis was made in 03/2018.  At that time, he presented with abdominal pain and on imaging he was found to have cirrhosis.  He also had on those imaging lesions concerning for hepatocellular carcinoma and then he came here to be evaluated for liver transplantation.  He did see our IR, Dr. Debora Del Rosario, who on 06/13/2018 did a CT-guided microwave ablation and after that no viable tumor was identified.  He also got listed for liver transplantation after he fulfilled the criteria and fortunately on 12/22/2018 he got a liver transplant.  Besides the liver he did also get his hernia repaired.  He slowly recuperated and is doing quite well now.  Mr. Limon also had some anastomotic stricture.  He did see  Dr. Omero Lawler and he had ERCPs done with stent placements multiple times and it appears that his labs are relatively good with his last liver function tests done today that are almost normal with an ALT of 13, AST of 7 and alkaline phosphatase 187.  Symptom wise, he denies any abdominal pain, although he has some numbness in the surgical area.  He has some nausea but no vomiting.  He has a latent TB and is currently taking isoniazid.  He also sees Urology for benign prostatic hypertrophy, and according to him he did gain some weight.  His appetite is good.  He has regular bowel movements and he complains of some dry skin and dry skin and also a burning sensation in his lower extremities.     Medical hx Surgical hx   Past Medical History:   Diagnosis Date     Biliary stricture of transplanted  liver (H) 2018     Cancer (H)      Cirrhosis of liver (H) 2018     Enlarged prostate      Inguinal hernia      Liver lesion 2018     Liver transplanted (H) 2018     Portal vein thrombosis      Postoperative atrial fibrillation (H) 2018     Pre-diabetes 2018      Past Surgical History:   Procedure Laterality Date     APPENDECTOMY       COLONOSCOPY           ENDOSCOPIC RETROGRADE CHOLANGIOPANCREATOGRAM N/A 2018    Procedure: ENDOSCOPIC RETROGRADE CHOLANGIOPANCREATOGRAM, with Billary Sphincterotomy and Billary Stent Placement;  Surgeon: Omero Lawler MD;  Location: [facility] OR     HERNIORRHAPHY INGUINAL  2018    Procedure: Herniorrhaphy inguinal;  Surgeon: Emil Echevarria MD;  Location: [facility] OR     IR LIVER BIOPSY PERCUTANEOUS  2018     TRANSPLANT LIVER RECIPIENT  DONOR N/A 2018    Procedure: TRANSPLANT LIVER RECIPIENT  DONOR;  Surgeon: Emil Echevarria MD;  Location: [facility] OR          Medications  Prior to Admission medications    Medication Sig Start Date End Date Taking? Authorizing Provider   alfuzosin ER (UROXATRAL) 10 MG 24 hr tablet Take 1 tablet (10 mg) by mouth daily 19  Yes Jaswinder Anne MD   amLODIPine (NORVASC) 10 MG tablet Take 1 tablet (10 mg) by mouth daily 19  Yes Inez Baker APRN CNP   aspirin (ASA) 325 MG EC tablet Take 1 tablet (325 mg) by mouth daily 19  Yes Inez Baker APRN CNP   blood glucose (NO BRAND SPECIFIED) lancets standard Use to test blood sugar 4 times daily or as directed. 19 Yes Inez Baker APRN CNP   blood glucose (ONETOUCH VERIO IQ) test strip Use to test blood sugar 4 times daily or as directed. 19 Yes Inez Baker APRN CNP   ciprofloxacin (CIPRO) 500 MG tablet Take 1 tablet (500 mg) by mouth 2 times daily for 5 days 19 Yes Omero Lawler MD   glucose 40 % (400 mg/mL) GEL gel Take 15-30 g by mouth  every 15 minutes as needed for low blood sugar 1/7/19  Yes Inez Baker APRN CNP   insulin pen needle (32G X 4 MM) 32G X 4 MM miscellaneous Use once pen needles daily or as directed. 1/7/19 1/7/20 Yes Inez Baker APRN CNP   isoniazid (NYDRAZID) 300 MG tablet Take 1 tablet (300 mg) by mouth daily 2/8/19 4/9/19 Yes Paulien Clancy MD   metoprolol tartrate 75 MG TABS Take 75 mg by mouth 2 times daily 1/7/19  Yes Inez Baker APRN CNP   multivitamin w/minerals (THERA-VIT-M) tablet Take 1 tablet by mouth daily 1/24/19  Yes Emil Echevarria MD   mycophenolate (GENERIC EQUIVALENT) 250 MG capsule Take 3 capsules (750 mg) by mouth 2 times daily 1/14/19  Yes Emil Echevarria MD   polyethylene glycol (MIRALAX/GLYCOLAX) powder Take 17 g (1 capful) by mouth 2 times daily Hold for loose stools 1/7/19  Yes Inez Baker APRN CNP   polyvinyl alcohol (LIQUIFILM TEARS) 1.4 % ophthalmic solution Place 1 drop into both eyes as needed for dry eyes 2/8/19  Yes Jaswinder Anne MD   senna-docusate (SENOKOT-S/PERICOLACE) 8.6-50 MG tablet Take 2 tablets by mouth 2 times daily Hold for loose stools 1/24/19  Yes Emil Echevarria MD   Sharps Container MISC 1 each daily 1/7/19  Yes Inez Baker APRN CNP   sulfamethoxazole-trimethoprim (BACTRIM/SEPTRA) 400-80 MG tablet Take 1 tablet by mouth daily 1/7/19  Yes Inez Baker APRN CNP   tacrolimus (GENERIC EQUIVALENT) 0.5 MG capsule Take 1 cap by mouth twice daily as directed by Transplant Center for dose adjustment 1/7/19  Yes Inez Baker APRN CNP   tacrolimus (GENERIC EQUIVALENT) 1 MG capsule Take 8 capsules (8 mg) by mouth 2 times daily 1/11/19  Yes Emil Echevarria MD   ursodiol (ACTIGALL) 250 MG tablet Take 1 tablet (250 mg) by mouth 2 times daily 1/7/19  Yes Inez Baker APRN CNP   valGANciclovir (VALCYTE) 450 MG tablet Take 2 tablets (900 mg) by mouth daily 2/5/19  Yes Pauline Clancy MD  "  vitamin B6 (PYRIDOXINE) 50 MG TABS Take 1 tablet (50 mg) by mouth daily 2/8/19 4/9/19 Yes Pauline Clancy MD   bumetanide (BUMEX) 1 MG tablet Take 1 tablet (1 mg) by mouth daily for 2 days  Patient not taking: Reported on 2/4/2019 1/8/19 1/10/19  Inez Baker APRN CNP   simethicone (MYLICON) 80 MG chewable tablet Take 1 tablet (80 mg) by mouth every 6 hours as needed for flatulence or cramping 1/7/19 2/4/19  Inez Baker APRN CNP   urea (GORMEL) 20 % external cream Apply as a moisturizer to your whole body everyday. 2/20/19   Rolly Smith MD       Allergies  No Known Allergies    Review of systems  A 10-point review of systems was negative    Examination  /73   Pulse 50   Temp 98  F (36.7  C) (Oral)   Ht 1.702 m (5' 7\")   Wt 78 kg (172 lb)   SpO2 97%   BMI 26.94 kg/m     Body mass index is 26.94 kg/m .    Gen- well, NAD, A+Ox3, normal color  Lym- no palpable LAD  CVS- RRR  RS- CTA  Abd- Healed surgical scan. Not tender.  Extr- hands normal, no OSKAR  Skin- no rash or jaundice  Neuro- no asterixis  Psych- normal mood    Laboratory  Lab Results   Component Value Date     02/20/2019    POTASSIUM 5.2 02/20/2019    CHLORIDE 107 02/20/2019    CO2 24 02/20/2019    BUN 19 02/20/2019    CR 0.87 02/20/2019       Lab Results   Component Value Date    BILITOTAL 0.7 02/20/2019    ALT 13 02/20/2019    AST 7 02/20/2019    ALKPHOS 187 02/20/2019       Lab Results   Component Value Date    ALBUMIN 3.6 02/20/2019    PROTTOTAL 6.5 02/20/2019        Lab Results   Component Value Date    WBC 3.3 02/20/2019    HGB 9.6 02/20/2019    MCV 91 02/20/2019     02/20/2019       Lab Results   Component Value Date    INR 1.14 12/31/2018       Radiology    ASSESSMENT AND PLAN:  Mr. Limon has alcoholic liver disease leading to cirrhosis and portal hypertension and he had this complicated by hepatocellular carcinoma.  He did get CT guided microwave ablation of that lesion and eventually had received " on 12/22/2018 a liver transplant.  Post-liver transplantation, he has some anastomotic strictures which were managed with dilations and stent placement by Dr. Omero Lawler.  He is doing quite well now.  His liver enzymes are almost normal.  He is on Prograf and we will continue that.  He is also on CellCept, which might be giving him some of the nausea and that will be eventually weaned off.  He wants to see Dermatology and we will get him seen at least for his baseline skin checkup and the dermatologist can attend to his complaints of burning sensation and dry skin.  Besides that, he is only 2 months liver transplantation and he will see Oncology at least for the first 2 years.  As far as his other medical issues are concerned, he will follow Urology for his benign prostatic hypertrophy and with his primary care physician for his other healthcare maintenance issues.       Tamela Carballo MD  Hepatology  [facility]       cc:  Jaswinder Anne MD, Primary Care Provider

## 2019-02-20 NOTE — NURSING NOTE
"Chief Complaint   Patient presents with     Skin Check     Loy is here to be seen for a transplant skin check and notes  \"overall skin feel more sensitive after transplant specifically hands and feet has been very hot and sweaty\"     Mathew Curry, LPN    "

## 2019-02-20 NOTE — PROGRESS NOTES
"[facility] Dermatology Note    Dermatology Problem List:  1. Liver transplant 12/22/18 on tacrolimus and mycophenolate mofetil  2. Skin sensitivity/neuropathy post transplant   - Etiology unclear; possible erythromelalgia but clinical picture not entirely consistent. Suspect neuropathy from transplant medications.   - Current Rx: urea 20% as daily moisturizer    3. Hyperhidrosis of the soles of feet  - Current Rx: Aluminum chloride 20% at bedtime     Encounter Date: Feb 20, 2019    CC:   Chief Complaint   Patient presents with     Skin Check     Loy is here to be seen for a Transplant skin check  \"hands cold and hot sensitvity, overall skin feel more sensitiv feet is sweaty\"     History of Present Illness:  Mr. Loy Limon is a 63 year old male who has a history of recent living donor liver transplant on 12/22/18 2/2 alcoholic cirrhosis w/ HCC who presents for both a transplant skin check and a complaint of skin sensitivity since his transplant. His hands especially are very sensitive to cold and hot water. The palms of his hands can turn bright red when exposed to water. However, the sensitivity can also occur on other parts of his body including his trunk. He feels like his skin is thinner and bruises easier. These symptoms started after his transplant. He is currently on tacrolimus and mycophenolate mofetil and valcyte. He was also diagnosed with latent tuberculosis so was started on rifampin initially but just changed to isoniazid + pyridoxine on 02/8/19 2/2 concerns for interaction with his transplant medications. He was on prednisone immediately post transplant but has been tapered off since. He is using cocoa butter as a moisturizer currently. Sometimes he experiences tingling/numbness in his hands and feet as well. 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 Finally he has experienced more sweating on his soles of his feet since his transplant.     He is going back to [place] and wants to get tan and asked for some recommendations for this. He has no personal or family history of skin cancer. He has no other concerns today except the sensitive skin as discussed above.     Past Medical History:   Patient Active Problem List   Diagnosis     Cirrhosis of liver (H)     Liver lesion     HCC (hepatocellular carcinoma) (H)     Liver transplant recipient (H)     Immunosuppressed status (H)     Hypervolemia     Atrial fibrillation with rapid ventricular response (H)     Hematuria     Bilateral wheezing     Hypoxia     Hyperglycemia     Pre-diabetes     Essential hypertension     Constipation     Biliary stricture of transplanted liver (H)     Drug-induced diabetes mellitus (H)     Past Medical History:   Diagnosis Date     Biliary stricture of transplanted liver (H) 2018     Cancer (H)     hepatocellualr carcinoma     Cirrhosis of liver (H) 2018     Enlarged prostate      Inguinal hernia     Repaired with mesh on 18     Liver lesion 2018     Liver transplanted (H) 2018     donor liver transplant     Portal vein thrombosis     on path explant     Postoperative atrial fibrillation (H) 2018     Pre-diabetes 2018     Past Surgical History:   Procedure Laterality Date     APPENDECTOMY       COLONOSCOPY           ENDOSCOPIC RETROGRADE CHOLANGIOPANCREATOGRAM N/A 2018    Procedure: ENDOSCOPIC RETROGRADE CHOLANGIOPANCREATOGRAM, with Billary Sphincterotomy and Billary Stent Placement;  Surgeon: Omero Lawler MD;  Location: [facility] OR     HERNIORRHAPHY INGUINAL  2018    Procedure: Herniorrhaphy inguinal;  Surgeon: Emil Echevarria MD;  Location: [facility] OR     IR LIVER BIOPSY PERCUTANEOUS  2018     TRANSPLANT LIVER RECIPIENT  DONOR N/A 2018    Procedure: TRANSPLANT LIVER RECIPIENT   DONOR;  Surgeon: Emil Echevarria MD;  Location:  OR     Social History:  Patient reports that he quit smoking about 11 months ago. His smoking use included cigarettes and cigars. He started smoking about 48 years ago. He has a 0.60 pack-year smoking history. he has never used smokeless tobacco. He reports that he does not drink alcohol or use drugs.    Family History:  Family History   Problem Relation Age of Onset     Liver Disease Brother      Skin Cancer No family hx of      Melanoma No family hx of      Medications:  Current Outpatient Medications   Medication Sig Dispense Refill     alfuzosin ER (UROXATRAL) 10 MG 24 hr tablet Take 1 tablet (10 mg) by mouth daily 90 tablet 3     amLODIPine (NORVASC) 10 MG tablet Take 1 tablet (10 mg) by mouth daily 30 tablet 11     aspirin (ASA) 325 MG EC tablet Take 1 tablet (325 mg) by mouth daily 30 tablet 5     blood glucose (NO BRAND SPECIFIED) lancets standard Use to test blood sugar 4 times daily or as directed. 200 each 11     blood glucose (ONETOUCH VERIO IQ) test strip Use to test blood sugar 4 times daily or as directed. 200 strip 11     ciprofloxacin (CIPRO) 500 MG tablet Take 1 tablet (500 mg) by mouth 2 times daily for 5 days 10 tablet 0     glucose 40 % (400 mg/mL) GEL gel Take 15-30 g by mouth every 15 minutes as needed for low blood sugar 30 g 3     insulin pen needle (32G X 4 MM) 32G X 4 MM miscellaneous Use once pen needles daily or as directed. 100 each 3     isoniazid (NYDRAZID) 300 MG tablet Take 1 tablet (300 mg) by mouth daily 60 tablet 0     metoprolol tartrate 75 MG TABS Take 75 mg by mouth 2 times daily 60 tablet 11     multivitamin w/minerals (THERA-VIT-M) tablet Take 1 tablet by mouth daily 90 tablet 3     mycophenolate (GENERIC EQUIVALENT) 250 MG capsule Take 3 capsules (750 mg) by mouth 2 times daily 180 capsule 11     polyethylene glycol (MIRALAX/GLYCOLAX) powder Take 17 g (1 capful) by mouth 2 times daily Hold for loose stools 850 g 3      polyvinyl alcohol (LIQUIFILM TEARS) 1.4 % ophthalmic solution Place 1 drop into both eyes as needed for dry eyes 15 mL 3     senna-docusate (SENOKOT-S/PERICOLACE) 8.6-50 MG tablet Take 2 tablets by mouth 2 times daily Hold for loose stools 120 tablet 0     Sharps Container MISC 1 each daily 1 each 2     sulfamethoxazole-trimethoprim (BACTRIM/SEPTRA) 400-80 MG tablet Take 1 tablet by mouth daily 30 tablet 5     tacrolimus (GENERIC EQUIVALENT) 0.5 MG capsule Take 1 cap by mouth twice daily as directed by Transplant Center for dose adjustment 60 capsule 0     tacrolimus (GENERIC EQUIVALENT) 1 MG capsule Take 8 capsules (8 mg) by mouth 2 times daily 480 capsule 3     ursodiol (ACTIGALL) 250 MG tablet Take 1 tablet (250 mg) by mouth 2 times daily 60 tablet 11     valGANciclovir (VALCYTE) 450 MG tablet Take 2 tablets (900 mg) by mouth daily 69 tablet 0     vitamin B6 (PYRIDOXINE) 50 MG TABS Take 1 tablet (50 mg) by mouth daily 60 tablet 0     bumetanide (BUMEX) 1 MG tablet Take 1 tablet (1 mg) by mouth daily for 2 days (Patient not taking: Reported on 2/4/2019) 2 tablet 0        No Known Allergies    Review of Systems:  -As per HPI  -Constitutional: Otherwise feeling well today, in usual state of health.  -HEENT: Patient denies nonhealing oral sores.  -Skin: As above in HPI. No additional skin concerns.    Physical exam:  Vitals: /68 (BP Location: Left arm, Patient Position: Chair)   Pulse (!) 49   GEN: This is a well developed, well-nourished male in no acute distress, in a pleasant mood.    SKIN: Full skin, which includes the head/face, both arms, chest, back, abdomen,both legs, genitalia and/or groin buttocks, digits and/or nails, was examined.  - Multiple regular brown pigmented macules and papules are identified on the trunk and extremities.  - Well-healed scar on the abdomen   - Several fingernails and toenails with a well-demarcated light brown linear stripe with no extension past the nailbed   - No  epidermal changes or erythema of the palms/soles   - No other lesions of concern on areas examined.     Impression/Plan:  1. Skin sensitivity post transplant   The etiology of his skin sensitivity is unclear but we suspect that his transplant medications are likely playing a role in causing neurogenic pain or neuropathy. Tacrolimus and valgancyclovir are possible culprits. He was recently started on isoniazid this month so don't suspect he is developing a peripheral neuropathy from this. Besides a neuropathic process, we discussed the possibility of erythromelalgia causes his pain, erythema, and sensitivity to water. However, the clinical picture does not entire fit, especially given his symptoms are occurring elsewhere on his body. Finally, prednisone can cause skin thinning and easy bruising. If neuropathic, a SNRI, gabapentin, or tricyclic antidepressant would be options. However, we will hold off on this for now and treat more conservatively. We anticipate that perhaps these symptoms may improve over time but if not, he may need to address his transplant medications with his transplant team if his symptoms become unbearable.      Start urea 20% cream as a daily moisturizer     2. Multiple clinically banal-appearing nevi on the trunk and extremities and longitudinal melanonychia     3. History of liver transplant 02/22/18     Reassured of benign etiology. Discussed in detail his increased risk of skin cancer post-transplant and that he should absolutely not pursue tanning when he goes back to [place]. Encouraged sun protective clothing.     Recommended use of a broad spectrum sunscreen of at least SPF 30 on all sun exposed sites daily.  Apply 20 minutes prior to exposure and repeat application every two hours or after sweating or swimming.  Avoid any intentional indoor or outdoor tanning.      Encouraged annual skin checks in setting of transplant     4. Hyperhidrosis of the soles of feet     Discussed trial of  aluminum chloride at night to help prevent excessive sweating. Will prescribe today.       CC Tamela Carballo MD  [address] [place] [address] [address]  [place] [address] on close of this encounter.  Follow-up in 3 months, earlier for new or changing lesions.     Dr. Smith staffed the patient.    Staff Involved:  Resident(Alisha Patel)/Staff    Staff Physician Comments:   I saw and evaluated the patient with the resident and I edited the assessment and plan as documented in the note. I was present for the examination.    Rolly Smith MD   of Dermatology  Department of Dermatology  [facility]

## 2019-02-20 NOTE — NURSING NOTE
"Chief Complaint   Patient presents with     RECHECK     liver tx     /73   Pulse 50   Temp 98  F (36.7  C) (Oral)   Ht 1.702 m (5' 7\")   Wt 78 kg (172 lb)   SpO2 97%   BMI 26.94 kg/m    Charlotte Sherman MA    "

## 2019-02-20 NOTE — PROGRESS NOTES
[facility]    Hepatology follow-up    CHIEF COMPLAINT/REASON FOR THE VISIT:  Status post liver transplantation for hepatocellular carcinoma.      HISTORY OF PRESENT ILLNESS:  Mr. Limon is a 63-year-old [place] immigrant whom I have seen here for alcoholic liver disease complicated by portal hypertension.  The diagnosis was made in 03/2018.  At that time, he presented with abdominal pain and on imaging he was found to have cirrhosis.  He also had on those imaging lesions concerning for hepatocellular carcinoma and then he came here to be evaluated for liver transplantation.  He did see our IR, Dr. Debora Del Rosario, who on 06/13/2018 did a CT-guided microwave ablation and after that no viable tumor was identified.  He also got listed for liver transplantation after he fulfilled the criteria and fortunately on 12/22/2018 he got a liver transplant.  Besides the liver he did also get his hernia repaired.  He slowly recuperated and is doing quite well now.  Mr. Limon also had some anastomotic stricture.  He did see  Dr. Omero Lawler and he had ERCPs done with stent placements multiple times and it appears that his labs are relatively good with his last liver function tests done today that are almost normal with an ALT of 13, AST of 7 and alkaline phosphatase 187.  Symptom wise, he denies any abdominal pain, although he has some numbness in the surgical area.  He has some nausea but no vomiting.  He has a latent TB and is currently taking isoniazid.  He also sees Urology for benign prostatic hypertrophy, and according to him he did gain some weight.  His appetite is good.  He has regular bowel movements and he complains of some dry skin and dry skin and also a burning sensation in his lower extremities.     Medical hx Surgical hx   Past Medical History:   Diagnosis Date     Biliary stricture of transplanted liver (H) 12/28/2018     Cancer (H)      Cirrhosis of liver (H) 5/8/2018     Enlarged  prostate      Inguinal hernia      Liver lesion 2018     Liver transplanted (H) 2018     Portal vein thrombosis      Postoperative atrial fibrillation (H) 2018     Pre-diabetes 2018      Past Surgical History:   Procedure Laterality Date     APPENDECTOMY       COLONOSCOPY           ENDOSCOPIC RETROGRADE CHOLANGIOPANCREATOGRAM N/A 2018    Procedure: ENDOSCOPIC RETROGRADE CHOLANGIOPANCREATOGRAM, with Billary Sphincterotomy and Billary Stent Placement;  Surgeon: Omero Lawler MD;  Location: [facility] OR     HERNIORRHAPHY INGUINAL  2018    Procedure: Herniorrhaphy inguinal;  Surgeon: Emil Echevarria MD;  Location: [facility] OR     IR LIVER BIOPSY PERCUTANEOUS  2018     TRANSPLANT LIVER RECIPIENT  DONOR N/A 2018    Procedure: TRANSPLANT LIVER RECIPIENT  DONOR;  Surgeon: Emil Echevarria MD;  Location: [facility] OR          Medications  Prior to Admission medications    Medication Sig Start Date End Date Taking? Authorizing Provider   alfuzosin ER (UROXATRAL) 10 MG 24 hr tablet Take 1 tablet (10 mg) by mouth daily 19  Yes Jaswinder Anne MD   amLODIPine (NORVASC) 10 MG tablet Take 1 tablet (10 mg) by mouth daily 19  Yes Inez Baker APRN CNP   aspirin (ASA) 325 MG EC tablet Take 1 tablet (325 mg) by mouth daily 19  Yes Inez Baker APRN CNP   blood glucose (NO BRAND SPECIFIED) lancets standard Use to test blood sugar 4 times daily or as directed. 19 Yes Inez Baker APRN CNP   blood glucose (ONETOUCH VERIO IQ) test strip Use to test blood sugar 4 times daily or as directed. 19 Yes Inez Baker APRN CNP   ciprofloxacin (CIPRO) 500 MG tablet Take 1 tablet (500 mg) by mouth 2 times daily for 5 days 19 Yes Omero Lawler MD   glucose 40 % (400 mg/mL) GEL gel Take 15-30 g by mouth every 15 minutes as needed for low blood sugar 19  Yes Inez Baker APRN CNP    insulin pen needle (32G X 4 MM) 32G X 4 MM miscellaneous Use once pen needles daily or as directed. 1/7/19 1/7/20 Yes Inez Baker APRN CNP   isoniazid (NYDRAZID) 300 MG tablet Take 1 tablet (300 mg) by mouth daily 2/8/19 4/9/19 Yes Pauline Clancy MD   metoprolol tartrate 75 MG TABS Take 75 mg by mouth 2 times daily 1/7/19  Yes Inez Baker APRN CNP   multivitamin w/minerals (THERA-VIT-M) tablet Take 1 tablet by mouth daily 1/24/19  Yes Emil Echevarria MD   mycophenolate (GENERIC EQUIVALENT) 250 MG capsule Take 3 capsules (750 mg) by mouth 2 times daily 1/14/19  Yes Emil Echevarria MD   polyethylene glycol (MIRALAX/GLYCOLAX) powder Take 17 g (1 capful) by mouth 2 times daily Hold for loose stools 1/7/19  Yes Inez Baker APRN CNP   polyvinyl alcohol (LIQUIFILM TEARS) 1.4 % ophthalmic solution Place 1 drop into both eyes as needed for dry eyes 2/8/19  Yes Jaswinder Anne MD   senna-docusate (SENOKOT-S/PERICOLACE) 8.6-50 MG tablet Take 2 tablets by mouth 2 times daily Hold for loose stools 1/24/19  Yes Emil Echevarria MD   Sharps Container MISC 1 each daily 1/7/19  Yes Inez Baker APRN CNP   sulfamethoxazole-trimethoprim (BACTRIM/SEPTRA) 400-80 MG tablet Take 1 tablet by mouth daily 1/7/19  Yes Inez Baker APRN CNP   tacrolimus (GENERIC EQUIVALENT) 0.5 MG capsule Take 1 cap by mouth twice daily as directed by Transplant Center for dose adjustment 1/7/19  Yes Inez Baker APRN CNP   tacrolimus (GENERIC EQUIVALENT) 1 MG capsule Take 8 capsules (8 mg) by mouth 2 times daily 1/11/19  Yes Emil Echevarria MD   ursodiol (ACTIGALL) 250 MG tablet Take 1 tablet (250 mg) by mouth 2 times daily 1/7/19  Yes Inez Baker APRN CNP   valGANciclovir (VALCYTE) 450 MG tablet Take 2 tablets (900 mg) by mouth daily 2/5/19  Yes Pauline Clancy MD   vitamin B6 (PYRIDOXINE) 50 MG TABS Take 1 tablet (50 mg) by mouth daily 2/8/19 4/9/19 Yes  "Pauline Clancy MD   bumetanide (BUMEX) 1 MG tablet Take 1 tablet (1 mg) by mouth daily for 2 days  Patient not taking: Reported on 2/4/2019 1/8/19 1/10/19  Inez Baker APRN CNP   simethicone (MYLICON) 80 MG chewable tablet Take 1 tablet (80 mg) by mouth every 6 hours as needed for flatulence or cramping 1/7/19 2/4/19  Inez Baker APRN CNP   urea (GORMEL) 20 % external cream Apply as a moisturizer to your whole body everyday. 2/20/19   Rolly Smith MD       Allergies  No Known Allergies    Review of systems  A 10-point review of systems was negative    Examination  /73   Pulse 50   Temp 98  F (36.7  C) (Oral)   Ht 1.702 m (5' 7\")   Wt 78 kg (172 lb)   SpO2 97%   BMI 26.94 kg/m    Body mass index is 26.94 kg/m .    Gen- well, NAD, A+Ox3, normal color  Lym- no palpable LAD  CVS- RRR  RS- CTA  Abd- Healed surgical scan. Not tender.  Extr- hands normal, no OSKAR  Skin- no rash or jaundice  Neuro- no asterixis  Psych- normal mood    Laboratory  Lab Results   Component Value Date     02/20/2019    POTASSIUM 5.2 02/20/2019    CHLORIDE 107 02/20/2019    CO2 24 02/20/2019    BUN 19 02/20/2019    CR 0.87 02/20/2019       Lab Results   Component Value Date    BILITOTAL 0.7 02/20/2019    ALT 13 02/20/2019    AST 7 02/20/2019    ALKPHOS 187 02/20/2019       Lab Results   Component Value Date    ALBUMIN 3.6 02/20/2019    PROTTOTAL 6.5 02/20/2019        Lab Results   Component Value Date    WBC 3.3 02/20/2019    HGB 9.6 02/20/2019    MCV 91 02/20/2019     02/20/2019       Lab Results   Component Value Date    INR 1.14 12/31/2018       Radiology    ASSESSMENT AND PLAN:  Mr. Limon has alcoholic liver disease leading to cirrhosis and portal hypertension and he had this complicated by hepatocellular carcinoma.  He did get CT guided microwave ablation of that lesion and eventually had received on 12/22/2018 a liver transplant.  Post-liver transplantation, he has some anastomotic " strictures which were managed with dilations and stent placement by Dr. Omero Lawler.  He is doing quite well now.  His liver enzymes are almost normal.  He is on Prograf and we will continue that.  He is also on CellCept, which might be giving him some of the nausea and that will be eventually weaned off.  He wants to see Dermatology and we will get him seen at least for his baseline skin checkup and the dermatologist can attend to his complaints of burning sensation and dry skin.  Besides that, he is only 2 months liver transplantation and he will see Oncology at least for the first 2 years.  As far as his other medical issues are concerned, he will follow Urology for his benign prostatic hypertrophy and with his primary care physician for his other healthcare maintenance issues.      cc:  Jaswinder Anne MD, Primary Care Provider       Tamela Carballo MD  Hepatology  [facility]

## 2019-02-20 NOTE — PATIENT INSTRUCTIONS
Start using the urea cream on your whole body as a moisturizer every day.     For your feet you can use aluminum chloride that can help them sweat less and dry them out.     We'll contact your transplant doctors about your other symptoms on your hands.

## 2019-02-20 NOTE — LETTER
2/20/2019       RE: Loy Limon  [address]  [place] [place] [address]     Dear Colleague,    Thank you for referring your patient, Loy Limon, to the [facility] HEPATOLOGY at [facility]. Please see a copy of my visit note below.    [facility]    Hepatology follow-up    CHIEF COMPLAINT/REASON FOR THE VISIT:  Status post liver transplantation for hepatocellular carcinoma.      HISTORY OF PRESENT ILLNESS:  Mr. Limon is a 63-year-old [place] immigrant whom I have seen here for alcoholic liver disease complicated by portal hypertension.  The diagnosis was made in 03/2018.  At that time, he presented with abdominal pain and on imaging he was found to have cirrhosis.  He also had on those imaging lesions concerning for hepatocellular carcinoma and then he came here to be evaluated for liver transplantation.  He did see our IR, Dr. Debora Del Rosario, who on 06/13/2018 did a CT-guided microwave ablation and after that no viable tumor was identified.  He also got listed for liver transplantation after he fulfilled the criteria and fortunately on 12/22/2018 he got a liver transplant.  Besides the liver he did also get his hernia repaired.  He slowly recuperated and is doing quite well now.  Mr. Limon also had some anastomotic stricture.  He did see  Dr. Omero Lawler and he had ERCPs done with stent placements multiple times and it appears that his labs are relatively good with his last liver function tests done today that are almost normal with an ALT of 13, AST of 7 and alkaline phosphatase 187.  Symptom wise, he denies any abdominal pain, although he has some numbness in the surgical area.  He has some nausea but no vomiting.  He has a latent TB and is currently taking isoniazid.  He also sees Urology for benign prostatic hypertrophy, and according to him he did gain some weight.  His appetite is good.  He has regular bowel movements and he  complains of some dry skin and dry skin and also a burning sensation in his lower extremities.     Medical hx Surgical hx   Past Medical History:   Diagnosis Date     Biliary stricture of transplanted liver (H) 2018     Cancer (H)      Cirrhosis of liver (H) 2018     Enlarged prostate      Inguinal hernia      Liver lesion 2018     Liver transplanted (H) 2018     Portal vein thrombosis      Postoperative atrial fibrillation (H) 2018     Pre-diabetes 2018      Past Surgical History:   Procedure Laterality Date     APPENDECTOMY       COLONOSCOPY           ENDOSCOPIC RETROGRADE CHOLANGIOPANCREATOGRAM N/A 2018    Procedure: ENDOSCOPIC RETROGRADE CHOLANGIOPANCREATOGRAM, with Billary Sphincterotomy and Billary Stent Placement;  Surgeon: Omero Lawler MD;  Location: [facility] OR     HERNIORRHAPHY INGUINAL  2018    Procedure: Herniorrhaphy inguinal;  Surgeon: Emil Echevarria MD;  Location: [facility] OR     IR LIVER BIOPSY PERCUTANEOUS  2018     TRANSPLANT LIVER RECIPIENT  DONOR N/A 2018    Procedure: TRANSPLANT LIVER RECIPIENT  DONOR;  Surgeon: Emil Echevarria MD;  Location: [facility] OR          Medications  Prior to Admission medications    Medication Sig Start Date End Date Taking? Authorizing Provider   alfuzosin ER (UROXATRAL) 10 MG 24 hr tablet Take 1 tablet (10 mg) by mouth daily 19  Yes Jaswinder Anne MD   amLODIPine (NORVASC) 10 MG tablet Take 1 tablet (10 mg) by mouth daily 19  Yes Inez Baker APRN CNP   aspirin (ASA) 325 MG EC tablet Take 1 tablet (325 mg) by mouth daily 19  Yes Inez Baker APRN CNP   blood glucose (NO BRAND SPECIFIED) lancets standard Use to test blood sugar 4 times daily or as directed. 19 Yes Inez Baker APRN CNP   blood glucose (ONETOUCH VERIO IQ) test strip Use to test blood sugar 4 times daily or as directed. 19 Yes Inez Baker APRN CNP    ciprofloxacin (CIPRO) 500 MG tablet Take 1 tablet (500 mg) by mouth 2 times daily for 5 days 2/18/19 2/23/19 Yes Omero Lawler MD   glucose 40 % (400 mg/mL) GEL gel Take 15-30 g by mouth every 15 minutes as needed for low blood sugar 1/7/19  Yes Inez Baker APRN CNP   insulin pen needle (32G X 4 MM) 32G X 4 MM miscellaneous Use once pen needles daily or as directed. 1/7/19 1/7/20 Yes Inez Baker APRN CNP   isoniazid (NYDRAZID) 300 MG tablet Take 1 tablet (300 mg) by mouth daily 2/8/19 4/9/19 Yes Pauline Clancy MD   metoprolol tartrate 75 MG TABS Take 75 mg by mouth 2 times daily 1/7/19  Yes Inez Baker APRN CNP   multivitamin w/minerals (THERA-VIT-M) tablet Take 1 tablet by mouth daily 1/24/19  Yes Emil Echevarria MD   mycophenolate (GENERIC EQUIVALENT) 250 MG capsule Take 3 capsules (750 mg) by mouth 2 times daily 1/14/19  Yes Emil Echevarria MD   polyethylene glycol (MIRALAX/GLYCOLAX) powder Take 17 g (1 capful) by mouth 2 times daily Hold for loose stools 1/7/19  Yes Inez Baker APRN CNP   polyvinyl alcohol (LIQUIFILM TEARS) 1.4 % ophthalmic solution Place 1 drop into both eyes as needed for dry eyes 2/8/19  Yes Jaswinder Anne MD   senna-docusate (SENOKOT-S/PERICOLACE) 8.6-50 MG tablet Take 2 tablets by mouth 2 times daily Hold for loose stools 1/24/19  Yes Emil Echevarria MD   Sharps Container MISC 1 each daily 1/7/19  Yes Inez Baker APRN CNP   sulfamethoxazole-trimethoprim (BACTRIM/SEPTRA) 400-80 MG tablet Take 1 tablet by mouth daily 1/7/19  Yes Inez Baker APRN CNP   tacrolimus (GENERIC EQUIVALENT) 0.5 MG capsule Take 1 cap by mouth twice daily as directed by Transplant Center for dose adjustment 1/7/19  Yes Inez Baker APRN CNP   tacrolimus (GENERIC EQUIVALENT) 1 MG capsule Take 8 capsules (8 mg) by mouth 2 times daily 1/11/19  Yes Emil Echevarria MD   ursodiol (ACTIGALL) 250 MG tablet Take 1  "tablet (250 mg) by mouth 2 times daily 1/7/19  Yes Inez Baker APRN CNP   valGANciclovir (VALCYTE) 450 MG tablet Take 2 tablets (900 mg) by mouth daily 2/5/19  Yes Pauline Clancy MD   vitamin B6 (PYRIDOXINE) 50 MG TABS Take 1 tablet (50 mg) by mouth daily 2/8/19 4/9/19 Yes Pauline Clancy MD   bumetanide (BUMEX) 1 MG tablet Take 1 tablet (1 mg) by mouth daily for 2 days  Patient not taking: Reported on 2/4/2019 1/8/19 1/10/19  Inez Baker APRN CNP   simethicone (MYLICON) 80 MG chewable tablet Take 1 tablet (80 mg) by mouth every 6 hours as needed for flatulence or cramping 1/7/19 2/4/19  Inez Baker APRN CNP   urea (GORMEL) 20 % external cream Apply as a moisturizer to your whole body everyday. 2/20/19   Rolly Smith MD       Allergies  No Known Allergies    Review of systems  A 10-point review of systems was negative    Examination  /73   Pulse 50   Temp 98  F (36.7  C) (Oral)   Ht 1.702 m (5' 7\")   Wt 78 kg (172 lb)   SpO2 97%   BMI 26.94 kg/m     Body mass index is 26.94 kg/m .    Gen- well, NAD, A+Ox3, normal color  Lym- no palpable LAD  CVS- RRR  RS- CTA  Abd- Healed surgical scan. Not tender.  Extr- hands normal, no OSKAR  Skin- no rash or jaundice  Neuro- no asterixis  Psych- normal mood    Laboratory  Lab Results   Component Value Date     02/20/2019    POTASSIUM 5.2 02/20/2019    CHLORIDE 107 02/20/2019    CO2 24 02/20/2019    BUN 19 02/20/2019    CR 0.87 02/20/2019       Lab Results   Component Value Date    BILITOTAL 0.7 02/20/2019    ALT 13 02/20/2019    AST 7 02/20/2019    ALKPHOS 187 02/20/2019       Lab Results   Component Value Date    ALBUMIN 3.6 02/20/2019    PROTTOTAL 6.5 02/20/2019        Lab Results   Component Value Date    WBC 3.3 02/20/2019    HGB 9.6 02/20/2019    MCV 91 02/20/2019     02/20/2019       Lab Results   Component Value Date    INR 1.14 12/31/2018       Radiology    ASSESSMENT AND PLAN:  Mr. Limon has alcoholic " liver disease leading to cirrhosis and portal hypertension and he had this complicated by hepatocellular carcinoma.  He did get CT guided microwave ablation of that lesion and eventually had received on 12/22/2018 a liver transplant.  Post-liver transplantation, he has some anastomotic strictures which were managed with dilations and stent placement by Dr. Omero Lawler.  He is doing quite well now.  His liver enzymes are almost normal.  He is on Prograf and we will continue that.  He is also on CellCept, which might be giving him some of the nausea and that will be eventually weaned off.  He wants to see Dermatology and we will get him seen at least for his baseline skin checkup and the dermatologist can attend to his complaints of burning sensation and dry skin.  Besides that, he is only 2 months liver transplantation and he will see Oncology at least for the first 2 years.  As far as his other medical issues are concerned, he will follow Urology for his benign prostatic hypertrophy and with his primary care physician for his other healthcare maintenance issues.      cc:  Jaswinder Anne MD, Primary Care Provider       Tamela Carballo MD  Hepatology  [facility]    Again, thank you for allowing me to participate in the care of your patient.      Sincerely,    Tamela Carballo MD

## 2019-02-20 NOTE — LETTER
"  RE: Loy Limon  [address]  [facility] [address]     Dear Colleague,    Thank you for referring your patient, Loy Limon, to the [facility] DERMATOLOGY at [facility]. Please see a copy of my visit note below.    [facility] Dermatology Note    Dermatology Problem List:  1. Liver transplant 12/22/18 on tacrolimus and mycophenolate mofetil  2. Skin sensitivity/neuropathy post transplant   - Etiology unclear; possible erythromelalgia but clinical picture not entirely consistent. Suspect neuropathy from transplant medications.   - Current Rx: urea 20% as daily moisturizer    3. Hyperhidrosis of the soles of feet  - Current Rx: Aluminum chloride 20% at bedtime     Encounter Date: Feb 20, 2019    CC:   Chief Complaint   Patient presents with     Skin Check     Loy is here to be seen for a Transplant skin check  \"hands cold and hot sensitvity, overall skin feel more sensitiv feet is sweaty\"     History of Present Illness:  Mr. Loy Limon is a 63 year old male who has a history of recent living donor liver transplant on 12/22/18 2/2 alcoholic cirrhosis w/ HCC who presents for both a transplant skin check and a complaint of skin sensitivity since his transplant. His hands especially are very sensitive to cold and hot water. The palms of his hands can turn bright red when exposed to water. However, the sensitivity can also occur on other parts of his body including his trunk. He feels like his skin is thinner and bruises easier. These symptoms started after his transplant. He is currently on tacrolimus and mycophenolate mofetil and valcyte. He was also diagnosed with latent tuberculosis so was started on rifampin initially but just changed to isoniazid + pyridoxine on 02/8/19 2/2 concerns for interaction with his transplant medications. He was on prednisone immediately post transplant but has been tapered off since. He is using " cocoa butter as a moisturizer currently. Sometimes he experiences tingling/numbness in his hands and feet as well. 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 Finally he has experienced more sweating on his soles of his feet since his transplant.     He is going back to [place] and wants to get tan and asked for some recommendations for this. He has no personal or family history of skin cancer. He has no other concerns today except the sensitive skin as discussed above.     Past Medical History:   Patient Active Problem List   Diagnosis     Cirrhosis of liver (H)     Liver lesion     HCC (hepatocellular carcinoma) (H)     Liver transplant recipient (H)     Immunosuppressed status (H)     Hypervolemia     Atrial fibrillation with rapid ventricular response (H)     Hematuria     Bilateral wheezing     Hypoxia     Hyperglycemia     Pre-diabetes     Essential hypertension     Constipation     Biliary stricture of transplanted liver (H)     Drug-induced diabetes mellitus (H)     Past Medical History:   Diagnosis Date     Biliary stricture of transplanted liver (H) 2018     Cancer (H)     hepatocellualr carcinoma     Cirrhosis of liver (H) 2018     Enlarged prostate      Inguinal hernia     Repaired with mesh on 18     Liver lesion 2018     Liver transplanted (H) 2018     donor liver transplant     Portal vein thrombosis     on path explant     Postoperative atrial fibrillation (H) 2018     Pre-diabetes 2018     Past Surgical History:   Procedure Laterality Date     APPENDECTOMY       COLONOSCOPY      2018     ENDOSCOPIC RETROGRADE CHOLANGIOPANCREATOGRAM N/A 2018    Procedure: ENDOSCOPIC RETROGRADE CHOLANGIOPANCREATOGRAM, with Billary Sphincterotomy and Billary Stent Placement;  Surgeon: Omero Lawler MD;  Location:  OR      HERNIORRHAPHY INGUINAL  2018    Procedure: Herniorrhaphy inguinal;  Surgeon: Emil Echevarria MD;  Location: [facility] OR     IR LIVER BIOPSY PERCUTANEOUS  2018     TRANSPLANT LIVER RECIPIENT  DONOR N/A 2018    Procedure: TRANSPLANT LIVER RECIPIENT  DONOR;  Surgeon: Emil Echevarria MD;  Location: [facility] OR     Social History:  Patient reports that he quit smoking about 11 months ago. His smoking use included cigarettes and cigars. He started smoking about 48 years ago. He has a 0.60 pack-year smoking history. he has never used smokeless tobacco. He reports that he does not drink alcohol or use drugs.    Family History:  Family History   Problem Relation Age of Onset     Liver Disease Brother      Skin Cancer No family hx of      Melanoma No family hx of      Medications:  Current Outpatient Medications   Medication Sig Dispense Refill     alfuzosin ER (UROXATRAL) 10 MG 24 hr tablet Take 1 tablet (10 mg) by mouth daily 90 tablet 3     amLODIPine (NORVASC) 10 MG tablet Take 1 tablet (10 mg) by mouth daily 30 tablet 11     aspirin (ASA) 325 MG EC tablet Take 1 tablet (325 mg) by mouth daily 30 tablet 5     blood glucose (NO BRAND SPECIFIED) lancets standard Use to test blood sugar 4 times daily or as directed. 200 each 11     blood glucose (ONETOUCH VERIO IQ) test strip Use to test blood sugar 4 times daily or as directed. 200 strip 11     ciprofloxacin (CIPRO) 500 MG tablet Take 1 tablet (500 mg) by mouth 2 times daily for 5 days 10 tablet 0     glucose 40 % (400 mg/mL) GEL gel Take 15-30 g by mouth every 15 minutes as needed for low blood sugar 30 g 3     insulin pen needle (32G X 4 MM) 32G X 4 MM miscellaneous Use once pen needles daily or as directed. 100 each 3     isoniazid (NYDRAZID) 300 MG tablet Take 1 tablet (300 mg) by mouth daily 60 tablet 0     metoprolol tartrate 75 MG TABS Take 75 mg by mouth 2 times daily 60 tablet 11     multivitamin w/minerals (THERA-VIT-M) tablet  Take 1 tablet by mouth daily 90 tablet 3     mycophenolate (GENERIC EQUIVALENT) 250 MG capsule Take 3 capsules (750 mg) by mouth 2 times daily 180 capsule 11     polyethylene glycol (MIRALAX/GLYCOLAX) powder Take 17 g (1 capful) by mouth 2 times daily Hold for loose stools 850 g 3     polyvinyl alcohol (LIQUIFILM TEARS) 1.4 % ophthalmic solution Place 1 drop into both eyes as needed for dry eyes 15 mL 3     senna-docusate (SENOKOT-S/PERICOLACE) 8.6-50 MG tablet Take 2 tablets by mouth 2 times daily Hold for loose stools 120 tablet 0     Sharps Container MISC 1 each daily 1 each 2     sulfamethoxazole-trimethoprim (BACTRIM/SEPTRA) 400-80 MG tablet Take 1 tablet by mouth daily 30 tablet 5     tacrolimus (GENERIC EQUIVALENT) 0.5 MG capsule Take 1 cap by mouth twice daily as directed by Transplant Center for dose adjustment 60 capsule 0     tacrolimus (GENERIC EQUIVALENT) 1 MG capsule Take 8 capsules (8 mg) by mouth 2 times daily 480 capsule 3     ursodiol (ACTIGALL) 250 MG tablet Take 1 tablet (250 mg) by mouth 2 times daily 60 tablet 11     valGANciclovir (VALCYTE) 450 MG tablet Take 2 tablets (900 mg) by mouth daily 69 tablet 0     vitamin B6 (PYRIDOXINE) 50 MG TABS Take 1 tablet (50 mg) by mouth daily 60 tablet 0     bumetanide (BUMEX) 1 MG tablet Take 1 tablet (1 mg) by mouth daily for 2 days (Patient not taking: Reported on 2/4/2019) 2 tablet 0     No Known Allergies    Physical exam:  Vitals: /68 (BP Location: Left arm, Patient Position: Chair)   Pulse (!) 49   GEN: This is a well developed, well-nourished male in no acute distress, in a pleasant mood.    SKIN: Full skin, which includes the head/face, both arms, chest, back, abdomen,both legs, genitalia and/or groin buttocks, digits and/or nails, was examined.  - Multiple regular brown pigmented macules and papules are identified on the trunk and extremities.  - Well-healed scar on the abdomen   - Several fingernails and toenails with a well-demarcated  light brown linear stripe with no extension past the nailbed   - No epidermal changes or erythema of the palms/soles   - No other lesions of concern on areas examined.     Impression/Plan:  1. Skin sensitivity post transplant   The etiology of his skin sensitivity is unclear but we suspect that his transplant medications are likely playing a role in causing neurogenic pain or neuropathy. Tacrolimus and valgancyclovir are possible culprits. He was recently started on isoniazid this month so don't suspect he is developing a peripheral neuropathy from this. Besides a neuropathic process, we discussed the possibility of erythromelalgia causes his pain, erythema, and sensitivity to water. However, the clinical picture does not entire fit, especially given his symptoms are occurring elsewhere on his body. Finally, prednisone can cause skin thinning and easy bruising. If neuropathic, a SNRI, gabapentin, or tricyclic antidepressant would be options. However, we will hold off on this for now and treat more conservatively. We anticipate that perhaps these symptoms may improve over time but if not, he may need to address his transplant medications with his transplant team if his symptoms become unbearable.      Start urea 20% cream as a daily moisturizer     2. Multiple clinically banal-appearing nevi on the trunk and extremities and longitudinal melanonychia     3. History of liver transplant 02/22/18     Reassured of benign etiology. Discussed in detail his increased risk of skin cancer post-transplant and that he should absolutely not pursue tanning when he goes back to [place]. Encouraged sun protective clothing.     Recommended use of a broad spectrum sunscreen of at least SPF 30 on all sun exposed sites daily.  Apply 20 minutes prior to exposure and repeat application every two hours or after sweating or swimming.  Avoid any intentional indoor or outdoor tanning.      Encouraged annual skin checks in setting of transplant      4. Hyperhidrosis of the soles of feet     Discussed trial of aluminum chloride at night to help prevent excessive sweating. Will prescribe today.       CC Tamela Carballo MD  [address] [place] [address] [address]  [place] [address] on close of this encounter.  Follow-up in 3 months, earlier for new or changing lesions.     Dr. Smith staffed the patient.    Staff Involved:  Resident(Alisha Patel)/Staff    Staff Physician Comments:   I saw and evaluated the patient with the resident and I edited the assessment and plan as documented in the note. I was present for the examination.    Rolly Smith MD   of Dermatology  Department of Dermatology  [facility]

## 2019-02-21 ENCOUNTER — TEAM CONFERENCE (OUTPATIENT)
Dept: TRANSPLANT | Facility: CLINIC | Age: 64
End: 2019-02-21

## 2019-02-21 ENCOUNTER — CARE COORDINATION (OUTPATIENT)
Dept: GASTROENTEROLOGY | Facility: CLINIC | Age: 64
End: 2019-02-21

## 2019-02-21 DIAGNOSIS — Z46.89 ENCOUNTER FOR REMOVAL OF PANCREATIC STENT: Primary | ICD-10-CM

## 2019-02-21 NOTE — TELEPHONE ENCOUNTER
Post Liver Transplant Team Conference  Date: 2/21/2019  Transplant Coordinator: Jeny Szymanski  Transplant Surgeon: Emil Echevarria      Attendees:  [] Dr. Harris [] Dr. England []       [x] Dr. Echevarria [] Brennan Chung LPN []    [] Dr. Carballo [] Camille Patel NP []    [] Dr. Field [] Nina Gallegos NP []    [] Dr. De La Cruz [] Noelle Carlton RN []       [] Dr. Juarez [x] Izabela Oropeza, RN []       [] Dr. Des Valdes [x] Jeny Szymanski, RN []       [] Dr. Colvin [] Lisa White RN []       [] Dr. Soria [] Becky Cruz, DALLIN []       [] Dr. Sharif [] Lucio Freeman, RN []         Verbal Plan Read Back:   Doing okay, has return appt on 2/25/19    Routed to RN Coordinator   Izabela Oropeza

## 2019-02-21 NOTE — PROGRESS NOTES
Post ERCP (2/18/19) with Dr. Lawler: Follow-up    Post procedure recommendations: Repeat ERCP in 10w with probable stent free trail to follow     Orders placed: ERCP    Patient states he is feeling good. Denies nausea, vomiting, abdominal pain and fever.     Clinic contact and scheduling numbers verified for future questions/concerns.     [address] Dr. Lawler, & Dr. Araujo  Advanced [facility]oscopy  [phone]

## 2019-02-25 ENCOUNTER — OFFICE VISIT (OUTPATIENT)
Dept: TRANSPLANT | Facility: CLINIC | Age: 64
End: 2019-02-25
Attending: TRANSPLANT SURGERY
Payer: MEDICARE

## 2019-02-25 ENCOUNTER — OFFICE VISIT (OUTPATIENT)
Dept: INTERPRETER SERVICES | Facility: CLINIC | Age: 64
End: 2019-02-25

## 2019-02-25 ENCOUNTER — TELEPHONE (OUTPATIENT)
Dept: TRANSPLANT | Facility: CLINIC | Age: 64
End: 2019-02-25

## 2019-02-25 ENCOUNTER — ANCILLARY PROCEDURE (OUTPATIENT)
Dept: GENERAL RADIOLOGY | Facility: CLINIC | Age: 64
End: 2019-02-25
Attending: TRANSPLANT SURGERY
Payer: MEDICARE

## 2019-02-25 VITALS
SYSTOLIC BLOOD PRESSURE: 145 MMHG | HEIGHT: 67 IN | HEART RATE: 59 BPM | TEMPERATURE: 98.1 F | OXYGEN SATURATION: 98 % | DIASTOLIC BLOOD PRESSURE: 61 MMHG | BODY MASS INDEX: 26.6 KG/M2 | WEIGHT: 169.5 LBS

## 2019-02-25 DIAGNOSIS — R10.9 ABDOMINAL PAIN: Primary | ICD-10-CM

## 2019-02-25 DIAGNOSIS — Z94.4 LIVER TRANSPLANTED (H): Primary | ICD-10-CM

## 2019-02-25 DIAGNOSIS — Z94.4 LIVER TRANSPLANT RECIPIENT (H): ICD-10-CM

## 2019-02-25 DIAGNOSIS — R10.9 ABDOMINAL PAIN: ICD-10-CM

## 2019-02-25 PROCEDURE — T1013 SIGN LANG/ORAL INTERPRETER: HCPCS | Mod: U3,ZF

## 2019-02-25 PROCEDURE — G0463 HOSPITAL OUTPT CLINIC VISIT: HCPCS | Mod: ZF

## 2019-02-25 RX ORDER — MYCOPHENOLATE MOFETIL 250 MG/1
500 CAPSULE ORAL 2 TIMES DAILY
Qty: 60 CAPSULE | Refills: 11 | Status: SHIPPED | OUTPATIENT
Start: 2019-02-25 | End: 2019-02-28

## 2019-02-25 RX ORDER — TRAMADOL HYDROCHLORIDE 50 MG/1
50 TABLET ORAL EVERY 6 HOURS PRN
Qty: 30 TABLET | Refills: 0 | Status: SHIPPED | OUTPATIENT
Start: 2019-02-25 | End: 2019-05-13

## 2019-02-25 ASSESSMENT — MIFFLIN-ST. JEOR: SCORE: 1512.48

## 2019-02-25 NOTE — LETTER
2019       RE: Loy Limon  [address]  [facility] [address]     Dear Colleague,    Thank you for referring your patient, Loy Limon, to the [facility] SOLID ORGAN TRANSPLANT at [facility]. Please see a copy of my visit note below.    HPI      ROS      Physical Exam    Transplant Surgery -OUTPATIENT IMMUNOSUPPRESSION PROGRESS NOTE    Date of Visit: 2019    Transplants:  2018 (Liver); Postoperative day:  65  ASSESMENT AND PLAN:  1.Graft Function: Liver allograft: no rejection or technical problems.    2.Immunosuppression Management: keep tacrolimus levels at 8ng /dL  3.Hypertension: ok  4.Renal Function: good  5.Lab frequency: weekly  6.Other:      Date: 2019    Transplant:  [x]                             Liver []                              Kidney []                             Pancreas []                              Other:             Chief Complaint:Surgical Followup (post op)    History of Present Illness:  Patient Active Problem List   Diagnosis     Cirrhosis of liver (H)     Liver lesion     HCC (hepatocellular carcinoma) (H)     Liver transplant recipient (H)     Immunosuppressed status (H)     Hypervolemia     Atrial fibrillation with rapid ventricular response (H)     Hematuria     Bilateral wheezing     Hypoxia     Hyperglycemia     Pre-diabetes     Essential hypertension     Constipation     Biliary stricture of transplanted liver (H)     Drug-induced diabetes mellitus (H)     SOCIAL /FAMILY HISTORY: [x]                  No recent change    Past Medical History:   Diagnosis Date     Biliary stricture of transplanted liver (H) 2018     Cancer (H)     hepatocellualr carcinoma     Cirrhosis of liver (H) 2018     Enlarged prostate      Inguinal hernia     Repaired with mesh on 18     Liver lesion 2018     Liver transplanted (H) 2018     donor liver transplant     Portal vein  thrombosis     on path explant     Postoperative atrial fibrillation (H) 2018     Pre-diabetes 2018     Past Surgical History:   Procedure Laterality Date     APPENDECTOMY       COLONOSCOPY           ENDOSCOPIC RETROGRADE CHOLANGIOPANCREATOGRAM N/A 2018    Procedure: ENDOSCOPIC RETROGRADE CHOLANGIOPANCREATOGRAM, with Billary Sphincterotomy and Billary Stent Placement;  Surgeon: Omero Lawler MD;  Location: [facility] OR     ENDOSCOPIC RETROGRADE CHOLANGIOPANCREATOGRAM  2019    Procedure: COMBINED ENDOSCOPIC RETROGRADE CHOLANGIOPANCREATOGRAPHY, Bile Duct Stent Exchange;  Surgeon: Omero Lawler MD;  Location: [facility] OR     HERNIORRHAPHY INGUINAL  2018    Procedure: Herniorrhaphy inguinal;  Surgeon: Emil Echevarria MD;  Location: [facility] OR     IR LIVER BIOPSY PERCUTANEOUS  2018     TRANSPLANT LIVER RECIPIENT  DONOR N/A 2018    Procedure: TRANSPLANT LIVER RECIPIENT  DONOR;  Surgeon: Emil Echevarria MD;  Location: [facility] OR     Social History     Socioeconomic History     Marital status:      Spouse name: Not on file     Number of children: Not on file     Years of education: Not on file     Highest education level: Not on file   Occupational History     Not on file   Social Needs     Financial resource strain: Not on file     Food insecurity:     Worry: Not on file     Inability: Not on file     Transportation needs:     Medical: Not on file     Non-medical: Not on file   Tobacco Use     Smoking status: Former Smoker     Packs/day: 0.03     Years: 20.00     Pack years: 0.60     Types: Cigarettes, Cigars     Start date: 1970     Last attempt to quit: 3/14/2018     Years since quittin.9     Smokeless tobacco: Never Used     Tobacco comment: Quit smoking 2018, one cigarette after dinner   Substance and Sexual Activity     Alcohol use: No     Comment: Quit drinking 2018     Drug use: No     Sexual activity: Not on file   Lifestyle      Physical activity:     Days per week: Not on file     Minutes per session: Not on file     Stress: Not on file   Relationships     Social connections:     Talks on phone: Not on file     Gets together: Not on file     Attends Muslim service: Not on file     Active member of club or organization: Not on file     Attends meetings of clubs or organizations: Not on file     Relationship status: Not on file     Intimate partner violence:     Fear of current or ex partner: Not on file     Emotionally abused: Not on file     Physically abused: Not on file     Forced sexual activity: Not on file   Other Topics Concern     Parent/sibling w/ CABG, MI or angioplasty before 65F 55M? Not Asked   Social History Narrative    Born in [place], came [place] ago.     Has worked in [employer]     Prescription Medications as of 2/25/2019       Rx Number Disp Refills Start End Last Dispensed Date Next Fill Date Owning Pharmacy    alfuzosin ER (UROXATRAL) 10 MG 24 hr tablet  90 tablet 3 1/24/2019    [facility] [address] [facility]    Sig: Take 1 tablet (10 mg) by mouth daily    Class: E-Prescribe    Route: Oral    amLODIPine (NORVASC) 10 MG tablet  30 tablet 11 1/7/2019    [address]    Sig: Take 1 tablet (10 mg) by mouth daily    Class: E-Prescribe    Route: Oral    aspirin (ASA) 325 MG EC tablet  30 tablet 5 1/7/2019    [address]    Sig: Take 1 tablet (325 mg) by mouth daily    Class: E-Prescribe    Route: Oral    blood glucose (NO BRAND SPECIFIED) lancets standard  200 each 11 1/7/2019 1/7/2020   [address]    Sig: Use to test blood sugar 4 times daily or as directed.    Class: E-Prescribe    blood glucose (ONETOUCH VERIO IQ) test strip  200 strip 11 1/7/2019 1/7/2020    [address]    Sig: Use to test blood sugar 4 times daily or as directed.    Class: E-Prescribe    Notes to Pharmacy: Onetouch Verio Flex strips    glucose 40 % (400 mg/mL) GEL gel  30 g 3 1/7/2019    [address]    Sig: Take 15-30 g by mouth every 15 minutes as needed for low blood sugar    Class: E-Prescribe    Route: Oral    insulin pen needle (32G X 4 MM) 32G X 4 MM miscellaneous  100 each 3 1/7/2019 1/7/2020   [address]    Sig: Use once pen needles daily or as directed.    Class: E-Prescribe    isoniazid (NYDRAZID) 300 MG tablet  60 tablet 0 2/8/2019 4/9/2019   [phone]    Sig: Take 1 tablet (300 mg) by mouth daily    Class: E-Prescribe    Route: Oral    metoprolol tartrate 75 MG TABS  60 tablet 11 1/7/2019    [address]    Sig: Take 75 mg by mouth 2 times daily    Class: E-Prescribe    Route: Oral    multivitamin w/minerals (THERA-VIT-M) tablet  90 tablet 3 1/24/2019    [place] [facility] [address]    Sig: Take 1 tablet by mouth daily    Class: E-Prescribe    Route: Oral    mycophenolate (GENERIC EQUIVALENT) 250 MG capsule  180 capsule 11 1/14/2019    [facility]/[address]    Sig: Take 3 capsules (750 mg) by mouth 2 times daily    Class: E-Prescribe    Route: Oral    polyethylene glycol (MIRALAX/GLYCOLAX) powder  850 g 3 1/7/2019    [address]    Sig: Take 17 g (1 capful) by mouth 2 times daily Hold for loose stools    Class: E-Prescribe    Route: Oral    polyvinyl alcohol (LIQUIFILM TEARS) 1.4 % ophthalmic solution  15 mL 3 2/8/2019    [facility]  [phone]    Sig: Place 1 drop into both eyes as needed for dry eyes    Class: E-Prescribe    Route: Both Eyes    senna-docusate (SENOKOT-S/PERICOLACE) 8.6-50 MG tablet  120 tablet 0 2019    [facility] [place] [facility] [address]    Sig: Take 2 tablets by mouth 2 times daily Hold for loose stools    Class: E-Prescribe    Route: Oral    Sharps Container MISC  1 each 2 2019    [address]    Si each daily    Class: E-Prescribe    Route: Does not apply    sulfamethoxazole-trimethoprim (BACTRIM/SEPTRA) 400-80 MG tablet  30 tablet 5 2019    [address]    Sig: Take 1 tablet by mouth daily    Class: E-Prescribe    Route: Oral    tacrolimus (GENERIC EQUIVALENT) 0.5 MG capsule  60 capsule 0 2019    [address]    Sig: Take 1 cap by mouth twice daily as directed by Transplant Center for dose adjustment    Class: E-Prescribe    tacrolimus (GENERIC EQUIVALENT) 1 MG capsule  480 capsule 3 2019    [facility]/[address]    Sig: Take 8 capsules (8 mg) by mouth 2 times daily    Class: E-Prescribe    Notes to Pharmacy: Dose increase    Route: Oral    urea (GORMEL) 20 % external cream  480 g 5 2019    [facility] [phone]    Sig: Apply as a moisturizer to your whole body everyday.    Class: E-Prescribe    ursodiol (ACTIGALL) 250 MG tablet  60 tablet 11 2019    [address]    Sig: Take 1 tablet (250 mg) by mouth 2 times daily    Class: E-Prescribe    Route: Oral    valGANciclovir (VALCYTE) 450 MG tablet  69 tablet 0 2019    [place] [facility]  "[place] - [address]     Sig: Take 2 tablets (900 mg) by mouth daily    Class: E-Prescribe    Route: Oral    vitamin B6 (PYRIDOXINE) 50 MG TABS  60 tablet 0 2/8/2019 4/9/2019   [phone]    Sig: Take 1 tablet (50 mg) by mouth daily    Class: E-Prescribe    Route: Oral    bumetanide (BUMEX) 1 MG tablet  2 tablet 0 1/8/2019 1/10/2019   [address]    Sig: Take 1 tablet (1 mg) by mouth daily for 2 days    Class: E-Prescribe    Route: Oral    ciprofloxacin (CIPRO) 500 MG tablet (Ended)  10 tablet 0 2/18/2019 2/23/2019   [address]    Sig: Take 1 tablet (500 mg) by mouth 2 times daily for 5 days    Class: E-Prescribe    Route: Oral    simethicone (MYLICON) 80 MG chewable tablet (Ended)  40 tablet 0 1/7/2019 2/4/2019   [address]    Sig: Take 1 tablet (80 mg) by mouth every 6 hours as needed for flatulence or cramping    Class: E-Prescribe    Route: Oral      Clinic-Administered Medications as of 2/25/2019       Dose Frequency Start End    aluminum chloride (DRYSOL) 20 % external solution  ONCE 2/20/2019     Sig: Apply topically once    Route: Topical        Patient has no known allergies.   REVIEW OF SYSTEMS (check box if normal)  [x]               GENERAL  [x]                 PULMONARY [x]                GENITOURINARY  [x]                CNS                 [x]                 CARDIAC  [x]                 ENDOCRINE  [x]                EARS,NOSE,THROAT [x]                 GASTROINTESTINAL [x]                 NEUROLOGIC    [x]                MUSCLOSKELTAL  [x]                  HEMATOLOGY      PHYSICAL EXAM (check box if normal)/61   Pulse 59   Temp 98.1  F (36.7  C) (Oral)   Ht 1.702 m (5' 7\")   Wt 76.9 kg (169 lb 8 oz)   SpO2 98%   BMI 26.55 kg/m           [x]  "           GENERAL:    [x]       EYES:  ICTERIC   []        YES  []                    NO  [x]           EXTREMITIES: Clubbing []       Y     [x]           N    [x]           EARS, NOSE, THROAT: Membranes Moist    YES   [x]                   NO []                  [x]           LUNGS:  CLEAR    YES       [x]                  NO    []                                [x]           SKIN: Jaundice           YES       []                  NO    [x]                   Rash: YES       []                  NO    [x]                                     [x]             HEART: Regular Rate          YES       [x]                  NO    []                   Incision Clean:  YES       [x]                  NO    []                                [x]                    ABDOMEN: Soft no point tenderness Organomegaly YES       []                  NO    [x]                       [x]                    NEUROLOGICAL:  Nonfocal  YES       [x]                  NO    []                       [x]                    Hernia YES       []                  NO    [x]                   PSYCHIATRIC:  Appropriate  YES       [x]                  NO    []                       OTHER:                                                                                                   PAIN SCALE:: 3    Again, thank you for allowing me to participate in the care of your patient.      Sincerely,    Emil Echevarria MD

## 2019-02-25 NOTE — TELEPHONE ENCOUNTER
Here for post liver transplant follow-up.  Accompanied by .  Doing well w/ labs, general follow-up and medications.  Had ERCP 2/18 w 3 stents placed.  Reviewed recent labs and assisted with interpretation.     Complaints / Concerns: right lower quadrant abdominal pain - developed in the last 4 days.  Asking for pain medication for this.  Gave script for tramadol, will get KUB today.  Result is back, sent to Swathi for review.  States he is eating well, having regular bowel movements, no diarrhea.    Current immunosuppression:    Prograf and cellcept    Med changes: Decreased cellcept to 500 mg po bid.  Updated patient's med card.    Lab frequency:  weekly    Follow-up:  Swathi again in 1 week to follow-up on abdominal pain, derm and PCP annually.  Reviewed my contact information, now to reach the transplant office during weekday and afterhours.

## 2019-02-25 NOTE — PROGRESS NOTES
HPI      ROS      Physical Exam    Transplant Surgery -OUTPATIENT IMMUNOSUPPRESSION PROGRESS NOTE    Date of Visit: 2019    Transplants:  2018 (Liver); Postoperative day:  65  ASSESMENT AND PLAN:  1.Graft Function: Liver allograft: no rejection or technical problems.    2.Immunosuppression Management: keep tacrolimus levels at 8ng /dL  3.Hypertension: ok  4.Renal Function: good  5.Lab frequency: weekly  6.Other:      Date: 2019    Transplant:  [x]                             Liver []                              Kidney []                             Pancreas []                              Other:             Chief Complaint:Surgical Followup (post op)    History of Present Illness:  Patient Active Problem List   Diagnosis     Cirrhosis of liver (H)     Liver lesion     HCC (hepatocellular carcinoma) (H)     Liver transplant recipient (H)     Immunosuppressed status (H)     Hypervolemia     Atrial fibrillation with rapid ventricular response (H)     Hematuria     Bilateral wheezing     Hypoxia     Hyperglycemia     Pre-diabetes     Essential hypertension     Constipation     Biliary stricture of transplanted liver (H)     Drug-induced diabetes mellitus (H)     SOCIAL /FAMILY HISTORY: [x]                  No recent change    Past Medical History:   Diagnosis Date     Biliary stricture of transplanted liver (H) 2018     Cancer (H)     hepatocellualr carcinoma     Cirrhosis of liver (H) 2018     Enlarged prostate      Inguinal hernia     Repaired with mesh on 18     Liver lesion 2018     Liver transplanted (H) 2018     donor liver transplant     Portal vein thrombosis     on path explant     Postoperative atrial fibrillation (H) 2018     Pre-diabetes 2018     Past Surgical History:   Procedure Laterality Date     APPENDECTOMY       COLONOSCOPY      2018     ENDOSCOPIC RETROGRADE CHOLANGIOPANCREATOGRAM N/A 2018    Procedure: ENDOSCOPIC  RETROGRADE CHOLANGIOPANCREATOGRAM, with Billary Sphincterotomy and Billary Stent Placement;  Surgeon: Omero Lawler MD;  Location: [facility]     ENDOSCOPIC RETROGRADE CHOLANGIOPANCREATOGRAM  2019    Procedure: COMBINED ENDOSCOPIC RETROGRADE CHOLANGIOPANCREATOGRAPHY, Bile Duct Stent Exchange;  Surgeon: Omero Lawler MD;  Location: [facility] OR     HERNIORRHAPHY INGUINAL  2018    Procedure: Herniorrhaphy inguinal;  Surgeon: Emil Echevarria MD;  Location: [facility] OR     IR LIVER BIOPSY PERCUTANEOUS  2018     TRANSPLANT LIVER RECIPIENT  DONOR N/A 2018    Procedure: TRANSPLANT LIVER RECIPIENT  DONOR;  Surgeon: Emil Echevarria MD;  Location: [facility] OR     Social History     Socioeconomic History     Marital status:      Spouse name: Not on file     Number of children: Not on file     Years of education: Not on file     Highest education level: Not on file   Occupational History     Not on file   Social Needs     Financial resource strain: Not on file     Food insecurity:     Worry: Not on file     Inability: Not on file     Transportation needs:     Medical: Not on file     Non-medical: Not on file   Tobacco Use     Smoking status: Former Smoker     Packs/day: 0.03     Years: 20.00     Pack years: 0.60     Types: Cigarettes, Cigars     Start date: 1970     Last attempt to quit: 3/14/2018     Years since quittin.9     Smokeless tobacco: Never Used     Tobacco comment: Quit smoking 2018, one cigarette after dinner   Substance and Sexual Activity     Alcohol use: No     Comment: Quit drinking 2018     Drug use: No     Sexual activity: Not on file   Lifestyle     Physical activity:     Days per week: Not on file     Minutes per session: Not on file     Stress: Not on file   Relationships     Social connections:     Talks on phone: Not on file     Gets together: Not on file     Attends [employer] service: Not on file     Active member of club or  organization: Not on file     Attends meetings of clubs or organizations: Not on file     Relationship status: Not on file     Intimate partner violence:     Fear of current or ex partner: Not on file     Emotionally abused: Not on file     Physically abused: Not on file     Forced sexual activity: Not on file   Other Topics Concern     Parent/sibling w/ CABG, MI or angioplasty before 65F 55M? Not Asked   Social History Narrative    Born in [place], came to [place] 30 years ago.     Has worked in [employer]     Prescription Medications as of 2/25/2019       Rx Number Disp Refills Start End Last Dispensed Date Next Fill Date Owning Pharmacy    alfuzosin ER (UROXATRAL) 10 MG 24 hr tablet  90 tablet 3 1/24/2019    [facility] [address]    Sig: Take 1 tablet (10 mg) by mouth daily    Class: E-Prescribe    Route: Oral    amLODIPine (NORVASC) 10 MG tablet  30 tablet 11 1/7/2019    [address]    Sig: Take 1 tablet (10 mg) by mouth daily    Class: E-Prescribe    Route: Oral    aspirin (ASA) 325 MG EC tablet  30 tablet 5 1/7/2019    [address]    Sig: Take 1 tablet (325 mg) by mouth daily    Class: E-Prescribe    Route: Oral    blood glucose (NO BRAND SPECIFIED) lancets standard  200 each 11 1/7/2019 1/7/2020   [address]    Sig: Use to test blood sugar 4 times daily or as directed.    Class: E-Prescribe    blood glucose (ONETOUCH VERIO IQ) test strip  200 strip 11 1/7/2019 1/7/2020   [address]    Sig: Use to test blood sugar 4 times daily or as directed.    Class: E-Prescribe    Notes to Pharmacy: Onetouch Verio Flex strips    glucose 40 % (400 mg/mL) GEL gel  30 g 3 1/7/2019    [facility]  [address]    Sig: Take 15-30 g by mouth every 15 minutes as needed for low blood sugar    Class: E-Prescribe    Route: Oral    insulin pen needle (32G X 4 MM) 32G X 4 MM miscellaneous  100 each 3 1/7/2019 1/7/2020   [address]    Sig: Use once pen needles daily or as directed.    Class: E-Prescribe    isoniazid (NYDRAZID) 300 MG tablet  60 tablet 0 2/8/2019 4/9/2019   [phone]    Sig: Take 1 tablet (300 mg) by mouth daily    Class: E-Prescribe    Route: Oral    metoprolol tartrate 75 MG TABS  60 tablet 11 1/7/2019    [address]    Sig: Take 75 mg by mouth 2 times daily    Class: E-Prescribe    Route: Oral    multivitamin w/minerals (THERA-VIT-M) tablet  90 tablet 3 1/24/2019    [facility] [address]    Sig: Take 1 tablet by mouth daily    Class: E-Prescribe    Route: Oral    mycophenolate (GENERIC EQUIVALENT) 250 MG capsule  180 capsule 11 1/14/2019    [facility] [address]    Sig: Take 3 capsules (750 mg) by mouth 2 times daily    Class: E-Prescribe    Route: Oral    polyethylene glycol (MIRALAX/GLYCOLAX) powder  850 g 3 1/7/2019    [address]    Sig: Take 17 g (1 capful) by mouth 2 times daily Hold for loose stools    Class: E-Prescribe    Route: Oral    polyvinyl alcohol (LIQUIFILM TEARS) 1.4 % ophthalmic solution  15 mL 3 2/8/2019    [address]    Sig: Place 1 drop into both eyes as needed for dry eyes    Class: E-Prescribe    Route: Both Eyes    senna-docusate (SENOKOT-S/PERICOLACE) 8.6-50 MG tablet  120 tablet 0 1/24/2019    [facility]   [address]    Sig: Take 2 tablets by mouth 2 times daily Hold for loose stools    Class: E-Prescribe    Route: Oral    Sharps Container MISC  1 each 2 2019    [address]    Si each daily    Class: E-Prescribe    Route: Does not apply    sulfamethoxazole-trimethoprim (BACTRIM/SEPTRA) 400-80 MG tablet  30 tablet 5 2019    [address]    Sig: Take 1 tablet by mouth daily    Class: E-Prescribe    Route: Oral    tacrolimus (GENERIC EQUIVALENT) 0.5 MG capsule  60 capsule 0 2019    [address]    Sig: Take 1 cap by mouth twice daily as directed by Transplant Center for dose adjustment    Class: E-Prescribe    tacrolimus (GENERIC EQUIVALENT) 1 MG capsule  480 capsule 3 2019    [facility] [address]    Sig: Take 8 capsules (8 mg) by mouth 2 times daily    Class: E-Prescribe    Notes to Pharmacy: Dose increase    Route: Oral    urea (GORMEL) 20 % external cream  480 g 5 2019    [address]    Sig: Apply as a moisturizer to your whole body everyday.    Class: E-Prescribe    ursodiol (ACTIGALL) 250 MG tablet  60 tablet 11 2019    [address]    Sig: Take 1 tablet (250 mg) by mouth 2 times daily    Class: E-Prescribe    Route: Oral    valGANciclovir (VALCYTE) 450 MG tablet  69 tablet 0 2019    [facility] [address]    Sig: Take 2 tablets (900 mg) by mouth daily    Class: E-Prescribe    Route: Oral    vitamin B6 (PYRIDOXINE) 50 MG TABS  60 tablet 0 2019   [phone]    Sig: Take 1 tablet  "(50 mg) by mouth daily    Class: E-Prescribe    Route: Oral    bumetanide (BUMEX) 1 MG tablet  2 tablet 0 1/8/2019 1/10/2019   [address]    Sig: Take 1 tablet (1 mg) by mouth daily for 2 days    Class: E-Prescribe    Route: Oral    ciprofloxacin (CIPRO) 500 MG tablet (Ended)  10 tablet 0 2/18/2019 2/23/2019   [address]    Sig: Take 1 tablet (500 mg) by mouth 2 times daily for 5 days    Class: E-Prescribe    Route: Oral    simethicone (MYLICON) 80 MG chewable tablet (Ended)  40 tablet 0 1/7/2019 2/4/2019   [address]    Sig: Take 1 tablet (80 mg) by mouth every 6 hours as needed for flatulence or cramping    Class: E-Prescribe    Route: Oral      Clinic-Administered Medications as of 2/25/2019       Dose Frequency Start End    aluminum chloride (DRYSOL) 20 % external solution  ONCE 2/20/2019     Sig: Apply topically once    Route: Topical        Patient has no known allergies.   REVIEW OF SYSTEMS (check box if normal)  [x]               GENERAL  [x]                 PULMONARY [x]                GENITOURINARY  [x]                CNS                 [x]                 CARDIAC  [x]                 ENDOCRINE  [x]                EARS,NOSE,THROAT [x]                 GASTROINTESTINAL [x]                 NEUROLOGIC    [x]                MUSCLOSKELTAL  [x]                  HEMATOLOGY      PHYSICAL EXAM (check box if normal)/61   Pulse 59   Temp 98.1  F (36.7  C) (Oral)   Ht 1.702 m (5' 7\")   Wt 76.9 kg (169 lb 8 oz)   SpO2 98%   BMI 26.55 kg/m          [x]            GENERAL:    [x]       EYES:  ICTERIC   []        YES  []                    NO  [x]           EXTREMITIES: Clubbing []       Y     [x]           N    [x]           EARS, NOSE, THROAT: Membranes Moist    YES   [x]                   NO []                  [x]           " LUNGS:  CLEAR    YES       [x]                  NO    []                                [x]           SKIN: Jaundice           YES       []                  NO    [x]                   Rash: YES       []                  NO    [x]                                     [x]             HEART: Regular Rate          YES       [x]                  NO    []                   Incision Clean:  YES       [x]                  NO    []                                [x]                    ABDOMEN: Soft no point tenderness Organomegaly YES       []                  NO    [x]                       [x]                    NEUROLOGICAL:  Nonfocal  YES       [x]                  NO    []                       [x]                    Hernia YES       []                  NO    [x]                   PSYCHIATRIC:  Appropriate  YES       [x]                  NO    []                       OTHER:                                                                                                   PAIN SCALE:: 3

## 2019-02-25 NOTE — NURSING NOTE
"Chief Complaint   Patient presents with     Surgical Followup     post op     Blood pressure 145/61, pulse 59, temperature 98.1  F (36.7  C), temperature source Oral, height 1.702 m (5' 7\"), weight 76.9 kg (169 lb 8 oz), SpO2 98 %.    SALAS CAMPA CMA    "

## 2019-02-25 NOTE — LETTER
RE: Loy Braxton  [address]  [facility] [address]     Transplant Surgery -OUTPATIENT IMMUNOSUPPRESSION PROGRESS NOTE    Date of Visit: 2019    Transplants:  2018 (Liver); Postoperative day:  65  ASSESMENT AND PLAN:  1.Graft Function: Liver allograft: no rejection or technical problems.    2.Immunosuppression Management: keep tacrolimus levels at 8ng /dL  3.Hypertension: ok  4.Renal Function: good  5.Lab frequency: weekly  6.Other:      Date: 2019    Transplant:  [x]                             Liver []                              Kidney []                             Pancreas []                              Other:             Chief Complaint:Surgical Followup (post op)    History of Present Illness:  Patient Active Problem List   Diagnosis     Cirrhosis of liver (H)     Liver lesion     HCC (hepatocellular carcinoma) (H)     Liver transplant recipient (H)     Immunosuppressed status (H)     Hypervolemia     Atrial fibrillation with rapid ventricular response (H)     Hematuria     Bilateral wheezing     Hypoxia     Hyperglycemia     Pre-diabetes     Essential hypertension     Constipation     Biliary stricture of transplanted liver (H)     Drug-induced diabetes mellitus (H)     SOCIAL /FAMILY HISTORY: [x]                  No recent change    Past Medical History:   Diagnosis Date     Biliary stricture of transplanted liver (H) 2018     Cancer (H)     hepatocellualr carcinoma     Cirrhosis of liver (H) 2018     Enlarged prostate      Inguinal hernia     Repaired with mesh on 18     Liver lesion 2018     Liver transplanted (H) 2018     donor liver transplant     Portal vein thrombosis     on path explant     Postoperative atrial fibrillation (H) 2018     Pre-diabetes 2018     Past Surgical History:   Procedure Laterality Date     APPENDECTOMY       COLONOSCOPY      2018     ENDOSCOPIC RETROGRADE CHOLANGIOPANCREATOGRAM N/A  2018    Procedure: ENDOSCOPIC RETROGRADE CHOLANGIOPANCREATOGRAM, with Billary Sphincterotomy and Billary Stent Placement;  Surgeon: Omero Lawler MD;  Location: [facility] OR     ENDOSCOPIC RETROGRADE CHOLANGIOPANCREATOGRAM  2019    Procedure: COMBINED ENDOSCOPIC RETROGRADE CHOLANGIOPANCREATOGRAPHY, Bile Duct Stent Exchange;  Surgeon: Omero Lawler MD;  Location: [facility] OR     HERNIORRHAPHY INGUINAL  2018    Procedure: Herniorrhaphy inguinal;  Surgeon: Emil Echevarria MD;  Location: [facility] OR     IR LIVER BIOPSY PERCUTANEOUS  2018     TRANSPLANT LIVER RECIPIENT  DONOR N/A 2018    Procedure: TRANSPLANT LIVER RECIPIENT  DONOR;  Surgeon: Emil Echevarria MD;  Location: [facility] OR     Social History     Socioeconomic History     Marital status:      Spouse name: Not on file     Number of children: Not on file     Years of education: Not on file     Highest education level: Not on file   Occupational History     Not on file   Social Needs     Financial resource strain: Not on file     Food insecurity:     Worry: Not on file     Inability: Not on file     Transportation needs:     Medical: Not on file     Non-medical: Not on file   Tobacco Use     Smoking status: Former Smoker     Packs/day: 0.03     Years: 20.00     Pack years: 0.60     Types: Cigarettes, Cigars     Start date: 1970     Last attempt to quit: 3/14/2018     Years since quittin.9     Smokeless tobacco: Never Used     Tobacco comment: Quit smoking 2018, one cigarette after dinner   Substance and Sexual Activity     Alcohol use: No     Comment: Quit drinking 2018     Drug use: No     Sexual activity: Not on file   Lifestyle     Physical activity:     Days per week: Not on file     Minutes per session: Not on file     Stress: Not on file   Relationships     Social connections:     Talks on phone: Not on file     Gets together: Not on file     Attends Mosque service: Not on file      Active member of club or organization: Not on file     Attends meetings of clubs or organizations: Not on file     Relationship status: Not on file     Intimate partner violence:     Fear of current or ex partner: Not on file     Emotionally abused: Not on file     Physically abused: Not on file     Forced sexual activity: Not on file   Other Topics Concern     Parent/sibling w/ CABG, MI or angioplasty before 65F 55M? Not Asked   Social History Narrative    Born in [place], came to [place] years ago.     Has worked in [employer], [employer]     Prescription Medications as of 2/25/2019       Rx Number Disp Refills Start End Last Dispensed Date Next Fill Date Owning Pharmacy    alfuzosin ER (UROXATRAL) 10 MG 24 hr tablet  90 tablet 3 1/24/2019    [facility] [address] [facility]    Sig: Take 1 tablet (10 mg) by mouth daily    Class: E-Prescribe    Route: Oral    amLODIPine (NORVASC) 10 MG tablet  30 tablet 11 1/7/2019    [address]    Sig: Take 1 tablet (10 mg) by mouth daily    Class: E-Prescribe    Route: Oral    aspirin (ASA) 325 MG EC tablet  30 tablet 5 1/7/2019    [address]    Sig: Take 1 tablet (325 mg) by mouth daily    Class: E-Prescribe    Route: Oral    blood glucose (NO BRAND SPECIFIED) lancets standard  200 each 11 1/7/2019 1/7/2020   [address]    Sig: Use to test blood sugar 4 times daily or as directed.    Class: E-Prescribe    blood glucose (ONETOUCH VERIO IQ) test strip  200 strip 11 1/7/2019 1/7/2020   [address]    Sig: Use to test blood sugar 4 times daily or as directed.    Class: E-Prescribe    Notes to Pharmacy: Onetouch Verio Flex strips    glucose 40 % (400 mg/mL) GEL gel  30 g 3 1/7/2019    [facility] [address]    Sig: Take 15-30 g by mouth every 15 minutes as needed for low blood sugar    Class: E-Prescribe    Route: Oral    insulin pen needle (32G X 4 MM) 32G X 4 MM miscellaneous  100 each 3 1/7/2019 1/7/2020   [address]    Sig: Use once pen needles daily or as directed.    Class: E-Prescribe    isoniazid (NYDRAZID) 300 MG tablet  60 tablet 0 2/8/2019 4/9/2019   [address]    Sig: Take 1 tablet (300 mg) by mouth daily    Class: E-Prescribe    Route: Oral    metoprolol tartrate 75 MG TABS  60 tablet 11 1/7/2019    [address]    Sig: Take 75 mg by mouth 2 times daily    Class: E-Prescribe    Route: Oral    multivitamin w/minerals (THERA-VIT-M) tablet  90 tablet 3 1/24/2019    [facility]    Sig: Take 1 tablet by mouth daily    Class: E-Prescribe    Route: Oral    mycophenolate (GENERIC EQUIVALENT) 250 MG capsule  180 capsule 11 1/14/2019    [facility]    Sig: Take 3 capsules (750 mg) by mouth 2 times daily    Class: E-Prescribe    Route: Oral    polyethylene glycol (MIRALAX/GLYCOLAX) powder  850 g 3 1/7/2019    [address]    Sig: Take 17 g (1 capful) by mouth 2 times daily Hold for loose stools    Class: E-Prescribe    Route: Oral    polyvinyl alcohol (LIQUIFILM TEARS) 1.4 % ophthalmic solution  15 mL 3 2/8/2019    [address]    Sig: Place 1 drop into both eyes as needed for dry eyes    Class: E-Prescribe    Route: Both Eyes    senna-docusate (SENOKOT-S/PERICOLACE) 8.6-50 MG tablet  120 tablet 0 1/24/2019    [facility] [address]    Sig: Take 2 tablets by mouth 2 times daily Hold for loose stools    Class: E-Prescribe    Route: Oral    Sharps Container MISC  1 each 2 2019    [address]    Si each daily    Class: E-Prescribe    Route: Does not apply    sulfamethoxazole-trimethoprim (BACTRIM/SEPTRA) 400-80 MG tablet  30 tablet 5 2019    [address]    Sig: Take 1 tablet by mouth daily    Class: E-Prescribe    Route: Oral    tacrolimus (GENERIC EQUIVALENT) 0.5 MG capsule  60 capsule 0 2019    [address]    Sig: Take 1 cap by mouth twice daily as directed by Transplant Center for dose adjustment    Class: E-Prescribe    tacrolimus (GENERIC EQUIVALENT) 1 MG capsule  480 capsule 3 2019    [facility]    Sig: Take 8 capsules (8 mg) by mouth 2 times daily    Class: E-Prescribe    Notes to Pharmacy: Dose increase    Route: Oral    urea (GORMEL) 20 % external cream  480 g 5 2019    [address]    Sig: Apply as a moisturizer to your whole body everyday.    Class: E-Prescribe    ursodiol (ACTIGALL) 250 MG tablet  60 tablet 11 2019    [address]    Sig: Take 1 tablet (250 mg) by mouth 2 times daily    Class: E-Prescribe    Route: Oral    valGANciclovir (VALCYTE) 450 MG tablet  69 tablet 0 2019    [facility]    Sig: Take 2 tablets (900 mg) by mouth daily    Class: E-Prescribe    Route: Oral    vitamin B6 (PYRIDOXINE) 50 MG TABS  60 tablet 0 2019   [address]  "[phone]    Sig: Take 1 tablet (50 mg) by mouth daily    Class: E-Prescribe    Route: Oral    bumetanide (BUMEX) 1 MG tablet  2 tablet 0 1/8/2019 1/10/2019   [address]    Sig: Take 1 tablet (1 mg) by mouth daily for 2 days    Class: E-Prescribe    Route: Oral    ciprofloxacin (CIPRO) 500 MG tablet (Ended)  10 tablet 0 2/18/2019 2/23/2019   [address]    Sig: Take 1 tablet (500 mg) by mouth 2 times daily for 5 days    Class: E-Prescribe    Route: Oral    simethicone (MYLICON) 80 MG chewable tablet (Ended)  40 tablet 0 1/7/2019 2/4/2019   [address]    Sig: Take 1 tablet (80 mg) by mouth every 6 hours as needed for flatulence or cramping    Class: E-Prescribe    Route: Oral      Clinic-Administered Medications as of 2/25/2019       Dose Frequency Start End    aluminum chloride (DRYSOL) 20 % external solution  ONCE 2/20/2019     Sig: Apply topically once    Route: Topical        Patient has no known allergies.   REVIEW OF SYSTEMS (check box if normal)  [x]               GENERAL  [x]                 PULMONARY [x]                GENITOURINARY  [x]                CNS                 [x]                 CARDIAC  [x]                 ENDOCRINE  [x]                EARS,NOSE,THROAT [x]                 GASTROINTESTINAL [x]                 NEUROLOGIC    [x]                MUSCLOSKELTAL  [x]                  HEMATOLOGY      PHYSICAL EXAM (check box if normal)/61   Pulse 59   Temp 98.1  F (36.7  C) (Oral)   Ht 1.702 m (5' 7\")   Wt 76.9 kg (169 lb 8 oz)   SpO2 98%   BMI 26.55 kg/m           [x]            GENERAL:    [x]       EYES:  ICTERIC   []        YES  []                    NO  [x]           EXTREMITIES: Clubbing []       Y     [x]           N    [x]           EARS, NOSE, THROAT: Membranes Moist    YES   [x]                   NO []         "          [x]           LUNGS:  CLEAR    YES       [x]                  NO    []                                [x]           SKIN: Jaundice           YES       []                  NO    [x]                   Rash: YES       []                  NO    [x]                                     [x]             HEART: Regular Rate          YES       [x]                  NO    []                   Incision Clean:  YES       [x]                  NO    []                                [x]                    ABDOMEN: Soft no point tenderness Organomegaly YES       []                  NO    [x]                       [x]                    NEUROLOGICAL:  Nonfocal  YES       [x]                  NO    []                       [x]                    Hernia YES       []                  NO    [x]                   PSYCHIATRIC:  Appropriate  YES       [x]                  NO    []                       OTHER:                                                                                                   PAIN SCALE:: 3    Emil Echevarria MD

## 2019-02-26 ENCOUNTER — TELEPHONE (OUTPATIENT)
Dept: TRANSPLANT | Facility: CLINIC | Age: 64
End: 2019-02-26

## 2019-02-26 DIAGNOSIS — K59.00 CONSTIPATION: Primary | ICD-10-CM

## 2019-02-26 DIAGNOSIS — Z94.4 LIVER TRANSPLANT RECIPIENT (H): ICD-10-CM

## 2019-02-26 LAB
ALBUMIN SERPL-MCNC: 3.6 G/DL (ref 3.4–5)
ALP SERPL-CCNC: 201 U/L (ref 40–150)
ALT SERPL W P-5'-P-CCNC: 17 U/L (ref 0–70)
ANION GAP SERPL CALCULATED.3IONS-SCNC: 7 MMOL/L (ref 3–14)
AST SERPL W P-5'-P-CCNC: 6 U/L (ref 0–45)
BASOPHILS # BLD AUTO: 0 10E9/L (ref 0–0.2)
BASOPHILS NFR BLD AUTO: 0.3 %
BILIRUB DIRECT SERPL-MCNC: 0.2 MG/DL (ref 0–0.2)
BILIRUB SERPL-MCNC: 0.6 MG/DL (ref 0.2–1.3)
BUN SERPL-MCNC: 20 MG/DL (ref 7–30)
CALCIUM SERPL-MCNC: 8.3 MG/DL (ref 8.5–10.1)
CHLORIDE SERPL-SCNC: 103 MMOL/L (ref 94–109)
CO2 SERPL-SCNC: 25 MMOL/L (ref 20–32)
CREAT SERPL-MCNC: 0.82 MG/DL (ref 0.66–1.25)
DIFFERENTIAL METHOD BLD: ABNORMAL
EOSINOPHIL # BLD AUTO: 0.2 10E9/L (ref 0–0.7)
EOSINOPHIL NFR BLD AUTO: 4.4 %
ERYTHROCYTE [DISTWIDTH] IN BLOOD BY AUTOMATED COUNT: 13 % (ref 10–15)
GFR SERPL CREATININE-BSD FRML MDRD: >90 ML/MIN/{1.73_M2}
GLUCOSE SERPL-MCNC: 180 MG/DL (ref 70–99)
HCT VFR BLD AUTO: 30.9 % (ref 40–53)
HGB BLD-MCNC: 10.1 G/DL (ref 13.3–17.7)
IMM GRANULOCYTES # BLD: 0 10E9/L (ref 0–0.4)
IMM GRANULOCYTES NFR BLD: 0.5 %
LYMPHOCYTES # BLD AUTO: 1.2 10E9/L (ref 0.8–5.3)
LYMPHOCYTES NFR BLD AUTO: 32.3 %
MAGNESIUM SERPL-MCNC: 1.9 MG/DL (ref 1.6–2.3)
MCH RBC QN AUTO: 28.9 PG (ref 26.5–33)
MCHC RBC AUTO-ENTMCNC: 32.7 G/DL (ref 31.5–36.5)
MCV RBC AUTO: 89 FL (ref 78–100)
MONOCYTES # BLD AUTO: 0.1 10E9/L (ref 0–1.3)
MONOCYTES NFR BLD AUTO: 3.8 %
NEUTROPHILS # BLD AUTO: 2.1 10E9/L (ref 1.6–8.3)
NEUTROPHILS NFR BLD AUTO: 58.7 %
NRBC # BLD AUTO: 0 10*3/UL
NRBC BLD AUTO-RTO: 0 /100
PHOSPHATE SERPL-MCNC: 4.1 MG/DL (ref 2.5–4.5)
PLATELET # BLD AUTO: 170 10E9/L (ref 150–450)
POTASSIUM SERPL-SCNC: 4.8 MMOL/L (ref 3.4–5.3)
PROT SERPL-MCNC: 6.9 G/DL (ref 6.8–8.8)
RBC # BLD AUTO: 3.49 10E12/L (ref 4.4–5.9)
SODIUM SERPL-SCNC: 135 MMOL/L (ref 133–144)
TACROLIMUS BLD-MCNC: 16 UG/L (ref 5–15)
TME LAST DOSE: ABNORMAL H
WBC # BLD AUTO: 3.7 10E9/L (ref 4–11)

## 2019-02-26 PROCEDURE — 83735 ASSAY OF MAGNESIUM: CPT | Performed by: TRANSPLANT SURGERY

## 2019-02-26 PROCEDURE — 80048 BASIC METABOLIC PNL TOTAL CA: CPT | Performed by: TRANSPLANT SURGERY

## 2019-02-26 PROCEDURE — 80197 ASSAY OF TACROLIMUS: CPT | Performed by: TRANSPLANT SURGERY

## 2019-02-26 PROCEDURE — 85025 COMPLETE CBC W/AUTO DIFF WBC: CPT | Performed by: TRANSPLANT SURGERY

## 2019-02-26 PROCEDURE — 84100 ASSAY OF PHOSPHORUS: CPT | Performed by: TRANSPLANT SURGERY

## 2019-02-26 PROCEDURE — 80076 HEPATIC FUNCTION PANEL: CPT | Performed by: TRANSPLANT SURGERY

## 2019-02-26 RX ORDER — POLYETHYLENE GLYCOL 3350 17 G/17G
1 POWDER, FOR SOLUTION ORAL DAILY PRN
Qty: 7 PACKET | Refills: 1 | Status: SHIPPED | OUTPATIENT
Start: 2019-02-26 | End: 2019-06-26

## 2019-02-26 RX ORDER — AMOXICILLIN 250 MG
2 CAPSULE ORAL 2 TIMES DAILY
Qty: 14 TABLET | Refills: 1 | Status: SHIPPED | OUTPATIENT
Start: 2019-02-26 | End: 2019-04-10

## 2019-02-26 RX ORDER — TACROLIMUS 1 MG/1
6 CAPSULE ORAL 2 TIMES DAILY
Qty: 360 CAPSULE | Refills: 3 | Status: SHIPPED | OUTPATIENT
Start: 2019-02-26 | End: 2019-05-24

## 2019-02-26 NOTE — TELEPHONE ENCOUNTER
Pt in clinic yesterday c/o right sided abdominal pain.  Abdominal film shows a lot of stool in colon. Per Swathi:    Please give him a Miralax 1 packet daily for 7 days and senna 1 bid for 7 days.    With the assistance of an  I gave him very detailed instructions as above.  He requests scripts be sent to  [facility] at [address] [facility]. - he will  these medications and start today.  He says that his abdominal pain was quite bad yesterday afternoon and last night.  He did not sleep well last night due to the pain unitl he took 2 tramadol.  He felt nauseated but did not throw up.    I asked him to come to our ER if the pain is not tolerable - he cannot eat or has emesis.

## 2019-02-26 NOTE — TELEPHONE ENCOUNTER
Tacrolimus level 16.  Claribel assured me that the timing of his doses is correct and he did NOT take prograf prior to his blood draw today.  He does have a tremor.    With the assistance of an  I asked Claribel to take only 3 mg tonight (was taking 8 mg bid) decrease tacrolimus dose to 6 mg in the morning and 6 mg in the evening.   He repeated this information accurately and said that he understands the medication change.

## 2019-02-27 ENCOUNTER — TELEPHONE (OUTPATIENT)
Dept: TRANSPLANT | Facility: CLINIC | Age: 64
End: 2019-02-27

## 2019-02-27 NOTE — TELEPHONE ENCOUNTER
Provider Call: Transplant Provider  Route to RN  Facility Name[facility]    Reason for Call: [facility] needs to clarify Loy's lab orders  Callback needed? Yes    Return Call Needed  Same as documented in contacts section  When to return call?: Same day: Route High Priority

## 2019-02-28 ENCOUNTER — TEAM CONFERENCE (OUTPATIENT)
Dept: TRANSPLANT | Facility: CLINIC | Age: 64
End: 2019-02-28

## 2019-02-28 ENCOUNTER — DOCUMENTATION ONLY (OUTPATIENT)
Dept: INFUSION THERAPY | Facility: CLINIC | Age: 64
End: 2019-02-28

## 2019-02-28 NOTE — PROGRESS NOTES
[facility] now requests orders and shares plan of care/discharge summaries for some patients through [employer].  Please REPLY TO THIS MESSAGE OR ROUTE BACK TO THE AUTHOR in order to give authorization for orders when needed.  This is considered a verbal order, you will still receive a faxed copy of orders for signature.  Thank you for your assistance in improving collaboration for our patients.    Dr. Echevarria and Jeny,    I just wanted to update you that I saw Loy this AM. He is still c/o right sided abdomen/back pain, 7/10 at worst, 2/10 at best. He said the pain is relieved with movement of his arms. Reported that he did have a BM this AM and is passing gas. Denied difficulty with urination, abdomen is firm/distended.   I advised him that he should be seen today to address his pain, but he refused and stated the as he has an appointment tomorrow. Advised him if his symptoms worsen he should seek medical attention.    Thanks,   René Curran RN Case Manager   [phone]  [email or web address]

## 2019-02-28 NOTE — TELEPHONE ENCOUNTER
Dr. Echevarria was requesting that Johan give himpink ladfiona enema.  We had started him on daily miralax and sennekot yesterday.  I called him now to see how it's going  He told me that he went to the pharmacy and they didn't have these medications. .  I called the [facility], the medications are waiting for him.  they said he didn't have insurance info and the cost was $24.20 which he couldn't pay.  He had a very hard time understanding - he said he waited outside for the medication.  I told him that he needs to go inside the [facility] pharmacy, give them his insurance information,  the medications and take a dose now.  Claribel has had 2 stools since starting this, pain is better, denies nausea.  Home[facility] does not have the supplies to administer an enema, Claribel would have to go to [facility] [employer] and buy a bucket.  Claribel has very little money and he would have a very hard time getting to a medical supply store.  He does not want to do this.  He will take the miralax and senna today.  I reminded him how to contact our office - asked him to call anytime he can't get a medication that we prescribe.  He knows how to reach an .

## 2019-02-28 NOTE — TELEPHONE ENCOUNTER
Post Liver Transplant Team Conference  Date: 2/28/2019  Transplant Coordinator: Jeny Szymanski  Transplant Surgeon: Emil Echevarria      Attendees:  [x] Dr. Harris [] Dr. England []       [x] Dr. Echevarria [x] Brennan Chung LPN []    [] Dr. Carballo [] Cmaille Patel NP []    [] Dr. Field [] Nina Gallegos NP []    [] Dr. De La Cruz [] Noelle Carlton RN []       [] Dr. Juarez [x] Izabela Oropeza, [place] []       [] Dr. Des Valdes [x] Jeny Szymnaski, [place] []       [] Dr. Colvin [x] Lisa White RN []       [] Dr. Soria [] Becky Cruz, RN []       [] Dr. Sharif [] Lucio Freeman, [place] []         Verbal Plan Read Back:     Abdominal film shows lots of stool in right colon.  Watching for signs of bowel obstruction.  Call homecare to give pink lady enema.    Routed to RN Coordinator   Jeny Szymanski

## 2019-02-28 NOTE — PROGRESS NOTES
Call back to Amna.  Left her a message letting her know that Dr. Echevarria wants Claribel to have a pink lady enema asap.  Will await her call back.

## 2019-03-01 ENCOUNTER — OFFICE VISIT (OUTPATIENT)
Dept: UROLOGY | Facility: CLINIC | Age: 64
End: 2019-03-01
Payer: MEDICARE

## 2019-03-01 VITALS
WEIGHT: 166 LBS | DIASTOLIC BLOOD PRESSURE: 63 MMHG | BODY MASS INDEX: 26.68 KG/M2 | HEIGHT: 66 IN | SYSTOLIC BLOOD PRESSURE: 121 MMHG | HEART RATE: 49 BPM

## 2019-03-01 DIAGNOSIS — N40.1 BENIGN PROSTATIC HYPERPLASIA WITH URINARY FREQUENCY: Primary | ICD-10-CM

## 2019-03-01 DIAGNOSIS — R35.0 BENIGN PROSTATIC HYPERPLASIA WITH URINARY FREQUENCY: Primary | ICD-10-CM

## 2019-03-01 ASSESSMENT — MIFFLIN-ST. JEOR: SCORE: 1480.72

## 2019-03-01 ASSESSMENT — PAIN SCALES - GENERAL: PAINLEVEL: NO PAIN (0)

## 2019-03-01 NOTE — LETTER
RE: Loy Limon  [address]  [facility] [address]     Dear Colleague,    Thank you for referring your patient, Loy Limon, to the [facility] UROLOGY AND INST FOR PROSTATE AND UROLOGIC CANCERS at [facility]. Please see a copy of my visit note below.    UROLOGIC DIAGNOSES:       CURRENT INTERVENTIONS:       HISTORY:       Patient presents for evaluation and management of BPH. Patient found to have enlarged prostate on  work up previously   Patient preparing for liver transplant and presents for clearance.      Nocturia 3x per night   Frequency up to 1.5-2 hours   Good stream of urine     Patient states that he is urinating q four hours at present. Nocturia is now 2x per night after starting afluzosin.     Patient continues to be pleased with his symptoms at present after starting alpha blocker.   Patient currently s/p liver transplant and doing very well.       We discussed alpha blocker use, PSA screening, follow up schedule. Briefly discussed outlet procedure should symptoms change.            PAST MEDICAL HISTORY:   Past Medical History:   Diagnosis Date     Biliary stricture of transplanted liver (H) 2018     Cancer (H)     hepatocellualr carcinoma     Cirrhosis of liver (H) 2018     Enlarged prostate      Inguinal hernia     Repaired with mesh on 18     Liver lesion 2018     Liver transplanted (H) 2018     donor liver transplant     Portal vein thrombosis     on path explant     Postoperative atrial fibrillation (H) 2018     Pre-diabetes 2018       PAST SURGICAL HISTORY:   Past Surgical History:   Procedure Laterality Date     APPENDECTOMY       COLONOSCOPY           ENDOSCOPIC RETROGRADE CHOLANGIOPANCREATOGRAM N/A 2018    Procedure: ENDOSCOPIC RETROGRADE CHOLANGIOPANCREATOGRAM, with Billary Sphincterotomy and Billary Stent Placement;  Surgeon: Omero Lawler MD;  Location:  OR      ENDOSCOPIC RETROGRADE CHOLANGIOPANCREATOGRAM  2019    Procedure: COMBINED ENDOSCOPIC RETROGRADE CHOLANGIOPANCREATOGRAPHY, Bile Duct Stent Exchange;  Surgeon: Omero Lawler MD;  Location: [facility] OR     HERNIORRHAPHY INGUINAL  2018    Procedure: Herniorrhaphy inguinal;  Surgeon: Emil Echevarria MD;  Location: [facility] OR     IR LIVER BIOPSY PERCUTANEOUS  2018     TRANSPLANT LIVER RECIPIENT  DONOR N/A 2018    Procedure: TRANSPLANT LIVER RECIPIENT  DONOR;  Surgeon: Emil Echevarria MD;  Location: [facility] OR       FAMILY HISTORY:   Family History   Problem Relation Age of Onset     Liver Disease Brother      Skin Cancer No family hx of      Melanoma No family hx of        SOCIAL HISTORY:   Social History     Tobacco Use     Smoking status: Former Smoker     Packs/day: 0.03     Years: 20.00     Pack years: 0.60     Types: Cigarettes, Cigars     Start date: 1970     Last attempt to quit: 3/14/2018     Years since quittin.9     Smokeless tobacco: Never Used     Tobacco comment: Quit smoking 2018, one cigarette after dinner   Substance Use Topics     Alcohol use: No     Comment: Quit drinking 2018       Current Outpatient Medications   Medication     alfuzosin ER (UROXATRAL) 10 MG 24 hr tablet     amLODIPine (NORVASC) 10 MG tablet     aspirin (ASA) 325 MG EC tablet     blood glucose (NO BRAND SPECIFIED) lancets standard     blood glucose (ONETOUCH VERIO IQ) test strip     glucose 40 % (400 mg/mL) GEL gel     insulin pen needle (32G X 4 MM) 32G X 4 MM miscellaneous     isoniazid (NYDRAZID) 300 MG tablet     metoprolol tartrate 75 MG TABS     multivitamin w/minerals (THERA-VIT-M) tablet     mycophenolate (GENERIC EQUIVALENT) 250 MG capsule     polyethylene glycol (MIRALAX/GLYCOLAX) packet     polyvinyl alcohol (LIQUIFILM TEARS) 1.4 % ophthalmic solution     Sharps Container MISC     sulfamethoxazole-trimethoprim (BACTRIM/SEPTRA) 400-80 MG tablet     tacrolimus  "(GENERIC EQUIVALENT) 1 MG capsule     traMADol (ULTRAM) 50 MG tablet     urea (GORMEL) 20 % external cream     ursodiol (ACTIGALL) 250 MG tablet     valGANciclovir (VALCYTE) 450 MG tablet     vitamin B6 (PYRIDOXINE) 50 MG TABS     magnesium citrate solution     Current Facility-Administered Medications   Medication     aluminum chloride (DRYSOL) 20 % external solution     PHYSICAL EXAM:    /63   Pulse (!) 49   Ht 1.676 m (5' 6\")   Wt 75.3 kg (166 lb)   BMI 26.79 kg/m       HEENT: Normocephalic and atraumatic   Cardiac: Not done  Back/Flank: Not done  CNS/PNS: Not done  Respiratory: Normal non-labored breathing  Abdomen: Soft nontender and nondistended  Peripheral Vascular: Not done  Mental Status: Not done    Penis: Not done  Scrotal Skin: Not done  Testicles: Not done  Epididymis: Not done  Digital Rectal Exam:     Cystoscopy: Not done    Imaging: None    Urinalysis: [facility] RESULTS:  Recent Labs   Lab Test  07/19/18   1133   COLOR  Yellow   APPEARANCE  Clear   URINEGLC  Negative   URINEBILI  Negative   URINEKETONE  Negative   SG  1.009   UBLD  Negative   URINEPH  8.0*   PROTEIN  Negative   NITRITE  Negative   LEUKEST  Negative     PSA: 3.27    Post Void Residual: 26ml    Other labs: None today      IMPRESSION:  62 y/o M with lower urinary tract symptoms now improved with alpha blocker     PLAN:  Continue alfuzosin   Uroflow/PVR in six months   PSA prior to next visit       Total Time: 10 minutes                                       Total in Consultation: greater than 50%       UROLOGIC DIAGNOSES:     CURRENT INTERVENTIONS:     HISTORY:       Patient presents for evaluation and management of BPH. Patient found to have enlarged prostate on  work up previously   Patient preparing for liver transplant and presents for clearance.      Nocturia 3x per night   Frequency up to 1.5-2 hours   Good stream of urine     Patient states that he is urinating q four hours at present. Nocturia is now 2x per night after " starting afluzosin.     Patient continues to be pleased with his symptoms at present after starting alpha blocker.   Patient currently s/p liver transplant and doing very well.       We discussed alpha blocker use, PSA screening, follow up schedule. Briefly discussed outlet procedure should symptoms change.            PAST MEDICAL HISTORY:   Past Medical History:   Diagnosis Date     Biliary stricture of transplanted liver (H) 2018     Cancer (H)     hepatocellualr carcinoma     Cirrhosis of liver (H) 2018     Enlarged prostate      Inguinal hernia     Repaired with mesh on 18     Liver lesion 2018     Liver transplanted (H) 2018     donor liver transplant     Portal vein thrombosis     on path explant     Postoperative atrial fibrillation (H) 2018     Pre-diabetes 2018       PAST SURGICAL HISTORY:   Past Surgical History:   Procedure Laterality Date     APPENDECTOMY       COLONOSCOPY           ENDOSCOPIC RETROGRADE CHOLANGIOPANCREATOGRAM N/A 2018    Procedure: ENDOSCOPIC RETROGRADE CHOLANGIOPANCREATOGRAM, with Billary Sphincterotomy and Billary Stent Placement;  Surgeon: Omero Lawler MD;  Location: [facility] OR     ENDOSCOPIC RETROGRADE CHOLANGIOPANCREATOGRAM  2019    Procedure: COMBINED ENDOSCOPIC RETROGRADE CHOLANGIOPANCREATOGRAPHY, Bile Duct Stent Exchange;  Surgeon: Omero Lawler MD;  Location: [facility] OR     HERNIORRHAPHY INGUINAL  2018    Procedure: Herniorrhaphy inguinal;  Surgeon: Emil Echevarria MD;  Location: [facility] OR     IR LIVER BIOPSY PERCUTANEOUS  2018     TRANSPLANT LIVER RECIPIENT  DONOR N/A 2018    Procedure: TRANSPLANT LIVER RECIPIENT  DONOR;  Surgeon: Emil Echevarria MD;  Location: [facility] OR       FAMILY HISTORY:   Family History   Problem Relation Age of Onset     Liver Disease Brother      Skin Cancer No family hx of      Melanoma No family hx of        SOCIAL HISTORY:   Social History  "    Tobacco Use     Smoking status: Former Smoker     Packs/day: 0.03     Years: 20.00     Pack years: 0.60     Types: Cigarettes, Cigars     Start date: 1970     Last attempt to quit: 3/14/2018     Years since quittin.9     Smokeless tobacco: Never Used     Tobacco comment: Quit smoking 2018, one cigarette after dinner   Substance Use Topics     Alcohol use: No     Comment: Quit drinking 2018       Current Outpatient Medications   Medication     alfuzosin ER (UROXATRAL) 10 MG 24 hr tablet     amLODIPine (NORVASC) 10 MG tablet     aspirin (ASA) 325 MG EC tablet     blood glucose (NO BRAND SPECIFIED) lancets standard     blood glucose (ONETOUCH VERIO IQ) test strip     glucose 40 % (400 mg/mL) GEL gel     insulin pen needle (32G X 4 MM) 32G X 4 MM miscellaneous     isoniazid (NYDRAZID) 300 MG tablet     metoprolol tartrate 75 MG TABS     multivitamin w/minerals (THERA-VIT-M) tablet     mycophenolate (GENERIC EQUIVALENT) 250 MG capsule     polyethylene glycol (MIRALAX/GLYCOLAX) packet     polyvinyl alcohol (LIQUIFILM TEARS) 1.4 % ophthalmic solution     Sharps Container MISC     sulfamethoxazole-trimethoprim (BACTRIM/SEPTRA) 400-80 MG tablet     tacrolimus (GENERIC EQUIVALENT) 1 MG capsule     traMADol (ULTRAM) 50 MG tablet     urea (GORMEL) 20 % external cream     ursodiol (ACTIGALL) 250 MG tablet     valGANciclovir (VALCYTE) 450 MG tablet     vitamin B6 (PYRIDOXINE) 50 MG TABS     magnesium citrate solution     Current Facility-Administered Medications   Medication     aluminum chloride (DRYSOL) 20 % external solution     PHYSICAL EXAM:    /63   Pulse (!) 49   Ht 1.676 m (5' 6\")   Wt 75.3 kg (166 lb)   BMI 26.79 kg/m       HEENT: Normocephalic and atraumatic   Cardiac: Not done  Back/Flank: Not done  CNS/PNS: Not done  Respiratory: Normal non-labored breathing  Abdomen: Soft nontender and nondistended  Peripheral Vascular: Not done  Mental Status: Not done    Penis: Not done  Scrotal Skin: " Not done  Testicles: Not done  Epididymis: Not done  Digital Rectal Exam:     Cystoscopy: Not done    Imaging: None    Urinalysis: UA RESULTS:  Recent Labs   Lab Test  07/19/18   1133   COLOR  Yellow   APPEARANCE  Clear   URINEGLC  Negative   URINEBILI  Negative   URINEKETONE  Negative   SG  1.009   UBLD  Negative   URINEPH  8.0*   PROTEIN  Negative   NITRITE  Negative   LEUKEST  Negative     PSA: 3.27    Post Void Residual: 26ml    Other labs: None today      IMPRESSION:  64 y/o M with lower urinary tract symptoms now improved with alpha blocker     PLAN:  Continue alfuzosin   Uroflow/PVR in six months   PSA prior to next visit       Total Time: 10 minutes                                       Total in Consultation: greater than 50%     Of note, patient's PSA was elevated. Will repeat in two months to evaluate for true rise in PSA. Patient will follow up after PSA.     Again, thank you for allowing me to participate in the care of your patient.      Sincerely,    Khloe Torres MD

## 2019-03-01 NOTE — PATIENT INSTRUCTIONS
Follow-up in 6 months with Dr Torres for a PVR.  Please come to clinic with a comfortably full bladder.  Please also have a PSA 1 week prior to your appointment.

## 2019-03-04 ENCOUNTER — OFFICE VISIT (OUTPATIENT)
Dept: INTERPRETER SERVICES | Facility: CLINIC | Age: 64
End: 2019-03-04

## 2019-03-04 ENCOUNTER — OFFICE VISIT (OUTPATIENT)
Dept: TRANSPLANT | Facility: CLINIC | Age: 64
End: 2019-03-04
Attending: TRANSPLANT SURGERY
Payer: MEDICARE

## 2019-03-04 ENCOUNTER — ANCILLARY PROCEDURE (OUTPATIENT)
Dept: CT IMAGING | Facility: CLINIC | Age: 64
End: 2019-03-04
Attending: TRANSPLANT SURGERY
Payer: MEDICARE

## 2019-03-04 ENCOUNTER — OFFICE VISIT (OUTPATIENT)
Dept: INTERPRETER SERVICES | Facility: CLINIC | Age: 64
End: 2019-03-04
Payer: MEDICARE

## 2019-03-04 ENCOUNTER — TELEPHONE (OUTPATIENT)
Dept: TRANSPLANT | Facility: CLINIC | Age: 64
End: 2019-03-04

## 2019-03-04 VITALS
OXYGEN SATURATION: 98 % | BODY MASS INDEX: 27.16 KG/M2 | SYSTOLIC BLOOD PRESSURE: 148 MMHG | DIASTOLIC BLOOD PRESSURE: 74 MMHG | WEIGHT: 168.3 LBS | HEART RATE: 54 BPM

## 2019-03-04 DIAGNOSIS — R97.20 ELEVATED PROSTATE SPECIFIC ANTIGEN (PSA): Primary | ICD-10-CM

## 2019-03-04 DIAGNOSIS — R10.11 RIGHT UPPER QUADRANT PAIN: ICD-10-CM

## 2019-03-04 DIAGNOSIS — R10.11 RIGHT UPPER QUADRANT PAIN: Primary | ICD-10-CM

## 2019-03-04 DIAGNOSIS — N40.0 BPH (BENIGN PROSTATIC HYPERPLASIA): ICD-10-CM

## 2019-03-04 DIAGNOSIS — Z94.4 LIVER REPLACED BY TRANSPLANT (H): ICD-10-CM

## 2019-03-04 DIAGNOSIS — C22.0 HCC (HEPATOCELLULAR CARCINOMA) (H): ICD-10-CM

## 2019-03-04 DIAGNOSIS — K59.00 CONSTIPATION: Primary | ICD-10-CM

## 2019-03-04 DIAGNOSIS — Z94.4 LIVER TRANSPLANTED (H): ICD-10-CM

## 2019-03-04 LAB
AFP SERPL-MCNC: 2.5 UG/L (ref 0–8)
ALBUMIN SERPL-MCNC: 4.1 G/DL (ref 3.4–5)
ALP SERPL-CCNC: 211 U/L (ref 40–150)
ALT SERPL W P-5'-P-CCNC: 17 U/L (ref 0–70)
AMYLASE SERPL-CCNC: 44 U/L (ref 30–110)
ANION GAP SERPL CALCULATED.3IONS-SCNC: 4 MMOL/L (ref 3–14)
AST SERPL W P-5'-P-CCNC: 10 U/L (ref 0–45)
BASOPHILS # BLD AUTO: 0 10E9/L (ref 0–0.2)
BASOPHILS NFR BLD AUTO: 1.1 %
BILIRUB DIRECT SERPL-MCNC: 0.3 MG/DL (ref 0–0.2)
BILIRUB SERPL-MCNC: 0.7 MG/DL (ref 0.2–1.3)
BUN SERPL-MCNC: 20 MG/DL (ref 7–30)
CALCIUM SERPL-MCNC: 8.9 MG/DL (ref 8.5–10.1)
CHLORIDE SERPL-SCNC: 102 MMOL/L (ref 94–109)
CO2 SERPL-SCNC: 27 MMOL/L (ref 20–32)
CREAT SERPL-MCNC: 0.78 MG/DL (ref 0.66–1.25)
DIFFERENTIAL METHOD BLD: ABNORMAL
EOSINOPHIL # BLD AUTO: 0.1 10E9/L (ref 0–0.7)
EOSINOPHIL NFR BLD AUTO: 3.6 %
ERYTHROCYTE [DISTWIDTH] IN BLOOD BY AUTOMATED COUNT: 12.9 % (ref 10–15)
GFR SERPL CREATININE-BSD FRML MDRD: >90 ML/MIN/{1.73_M2}
GLUCOSE SERPL-MCNC: 284 MG/DL (ref 70–99)
HCT VFR BLD AUTO: 34.4 % (ref 40–53)
HGB BLD-MCNC: 11.2 G/DL (ref 13.3–17.7)
IMM GRANULOCYTES # BLD: 0 10E9/L (ref 0–0.4)
IMM GRANULOCYTES NFR BLD: 0.5 %
LIPASE SERPL-CCNC: 42 U/L (ref 73–393)
LYMPHOCYTES # BLD AUTO: 1 10E9/L (ref 0.8–5.3)
LYMPHOCYTES NFR BLD AUTO: 28.3 %
MAGNESIUM SERPL-MCNC: 1.9 MG/DL (ref 1.6–2.3)
MCH RBC QN AUTO: 28.8 PG (ref 26.5–33)
MCHC RBC AUTO-ENTMCNC: 32.6 G/DL (ref 31.5–36.5)
MCV RBC AUTO: 88 FL (ref 78–100)
MONOCYTES # BLD AUTO: 0.2 10E9/L (ref 0–1.3)
MONOCYTES NFR BLD AUTO: 4.4 %
NEUTROPHILS # BLD AUTO: 2.3 10E9/L (ref 1.6–8.3)
NEUTROPHILS NFR BLD AUTO: 62.1 %
NRBC # BLD AUTO: 0 10*3/UL
NRBC BLD AUTO-RTO: 0 /100
PHOSPHATE SERPL-MCNC: 4.6 MG/DL (ref 2.5–4.5)
PLATELET # BLD AUTO: 199 10E9/L (ref 150–450)
POTASSIUM SERPL-SCNC: 5.1 MMOL/L (ref 3.4–5.3)
PROT SERPL-MCNC: 7.5 G/DL (ref 6.8–8.8)
PSA SERPL-MCNC: 5.25 UG/L (ref 0–4)
RBC # BLD AUTO: 3.89 10E12/L (ref 4.4–5.9)
SODIUM SERPL-SCNC: 133 MMOL/L (ref 133–144)
WBC # BLD AUTO: 3.6 10E9/L (ref 4–11)

## 2019-03-04 PROCEDURE — 82150 ASSAY OF AMYLASE: CPT | Performed by: INTERNAL MEDICINE

## 2019-03-04 PROCEDURE — 36415 COLL VENOUS BLD VENIPUNCTURE: CPT | Performed by: INTERNAL MEDICINE

## 2019-03-04 PROCEDURE — 84153 ASSAY OF PSA TOTAL: CPT | Performed by: INTERNAL MEDICINE

## 2019-03-04 PROCEDURE — 83690 ASSAY OF LIPASE: CPT | Performed by: INTERNAL MEDICINE

## 2019-03-04 PROCEDURE — 84100 ASSAY OF PHOSPHORUS: CPT | Performed by: INTERNAL MEDICINE

## 2019-03-04 PROCEDURE — G0463 HOSPITAL OUTPT CLINIC VISIT: HCPCS | Mod: ZF

## 2019-03-04 PROCEDURE — 80053 COMPREHEN METABOLIC PANEL: CPT | Performed by: INTERNAL MEDICINE

## 2019-03-04 PROCEDURE — T1013 SIGN LANG/ORAL INTERPRETER: HCPCS | Mod: U3,ZF

## 2019-03-04 PROCEDURE — 83735 ASSAY OF MAGNESIUM: CPT | Performed by: INTERNAL MEDICINE

## 2019-03-04 PROCEDURE — 85025 COMPLETE CBC W/AUTO DIFF WBC: CPT | Performed by: INTERNAL MEDICINE

## 2019-03-04 PROCEDURE — 82105 ALPHA-FETOPROTEIN SERUM: CPT | Performed by: INTERNAL MEDICINE

## 2019-03-04 PROCEDURE — 82248 BILIRUBIN DIRECT: CPT | Performed by: INTERNAL MEDICINE

## 2019-03-04 RX ORDER — IOPAMIDOL 755 MG/ML
103 INJECTION, SOLUTION INTRAVASCULAR ONCE
Status: COMPLETED | OUTPATIENT
Start: 2019-03-04 | End: 2019-03-04

## 2019-03-04 RX ADMIN — IOPAMIDOL 103 ML: 755 INJECTION, SOLUTION INTRAVASCULAR at 13:54

## 2019-03-04 ASSESSMENT — PAIN SCALES - GENERAL: PAINLEVEL: EXTREME PAIN (9)

## 2019-03-04 NOTE — PROGRESS NOTES
Chief Complaint   Patient presents with     Post-Op - General Surgery     post- op 8-9 weeks liver     Blood pressure 148/74, pulse 54, weight 76.3 kg (168 lb 4.8 oz), SpO2 98 %.    Angela Vo, CMA

## 2019-03-04 NOTE — PROGRESS NOTES
UROLOGIC DIAGNOSES:       CURRENT INTERVENTIONS:       HISTORY:       Patient presents for evaluation and management of BPH. Patient found to have enlarged prostate on  work up previously   Patient preparing for liver transplant and presents for clearance.      Nocturia 3x per night   Frequency up to 1.5-2 hours   Good stream of urine     Patient states that he is urinating q four hours at present. Nocturia is now 2x per night after starting afluzosin.     Patient continues to be pleased with his symptoms at present after starting alpha blocker.   Patient currently s/p liver transplant and doing very well.       We discussed alpha blocker use, PSA screening, follow up schedule. Briefly discussed outlet procedure should symptoms change.            PAST MEDICAL HISTORY:   Past Medical History:   Diagnosis Date     Biliary stricture of transplanted liver (H) 2018     Cancer (H)     hepatocellualr carcinoma     Cirrhosis of liver (H) 2018     Enlarged prostate      Inguinal hernia     Repaired with mesh on 18     Liver lesion 2018     Liver transplanted (H) 2018     donor liver transplant     Portal vein thrombosis     on path explant     Postoperative atrial fibrillation (H) 2018     Pre-diabetes 2018       PAST SURGICAL HISTORY:   Past Surgical History:   Procedure Laterality Date     APPENDECTOMY       COLONOSCOPY      2018     ENDOSCOPIC RETROGRADE CHOLANGIOPANCREATOGRAM N/A 2018    Procedure: ENDOSCOPIC RETROGRADE CHOLANGIOPANCREATOGRAM, with Billary Sphincterotomy and Billary Stent Placement;  Surgeon: Omero Lawler MD;  Location: [facility] OR     ENDOSCOPIC RETROGRADE CHOLANGIOPANCREATOGRAM  2019    Procedure: COMBINED ENDOSCOPIC RETROGRADE CHOLANGIOPANCREATOGRAPHY, Bile Duct Stent Exchange;  Surgeon: Omero Lawler MD;  Location: [facility] OR     HERNIORRHAPHY INGUINAL  2018    Procedure: Herniorrhaphy inguinal;  Surgeon: Emil Echevarria  MD;  Location: [facility] OR     IR LIVER BIOPSY PERCUTANEOUS  2018     TRANSPLANT LIVER RECIPIENT  DONOR N/A 2018    Procedure: TRANSPLANT LIVER RECIPIENT  DONOR;  Surgeon: Emil Echevarria MD;  Location: [facility] OR       FAMILY HISTORY:   Family History   Problem Relation Age of Onset     Liver Disease Brother      Skin Cancer No family hx of      Melanoma No family hx of        SOCIAL HISTORY:   Social History     Tobacco Use     Smoking status: Former Smoker     Packs/day: 0.03     Years: 20.00     Pack years: 0.60     Types: Cigarettes, Cigars     Start date: 1970     Last attempt to quit: 3/14/2018     Years since quittin.9     Smokeless tobacco: Never Used     Tobacco comment: Quit smoking 2018, one cigarette after dinner   Substance Use Topics     Alcohol use: No     Comment: Quit drinking 2018       Current Outpatient Medications   Medication     alfuzosin ER (UROXATRAL) 10 MG 24 hr tablet     amLODIPine (NORVASC) 10 MG tablet     aspirin (ASA) 325 MG EC tablet     blood glucose (NO BRAND SPECIFIED) lancets standard     blood glucose (ONETOUCH VERIO IQ) test strip     glucose 40 % (400 mg/mL) GEL gel     insulin pen needle (32G X 4 MM) 32G X 4 MM miscellaneous     isoniazid (NYDRAZID) 300 MG tablet     metoprolol tartrate 75 MG TABS     multivitamin w/minerals (THERA-VIT-M) tablet     mycophenolate (GENERIC EQUIVALENT) 250 MG capsule     polyethylene glycol (MIRALAX/GLYCOLAX) packet     polyvinyl alcohol (LIQUIFILM TEARS) 1.4 % ophthalmic solution     Sharps Container MISC     sulfamethoxazole-trimethoprim (BACTRIM/SEPTRA) 400-80 MG tablet     tacrolimus (GENERIC EQUIVALENT) 1 MG capsule     traMADol (ULTRAM) 50 MG tablet     urea (GORMEL) 20 % external cream     ursodiol (ACTIGALL) 250 MG tablet     valGANciclovir (VALCYTE) 450 MG tablet     vitamin B6 (PYRIDOXINE) 50 MG TABS     magnesium citrate solution     Current Facility-Administered Medications   Medication      "aluminum chloride (DRYSOL) 20 % external solution         PHYSICAL EXAM:    /63   Pulse (!) 49   Ht 1.676 m (5' 6\")   Wt 75.3 kg (166 lb)   BMI 26.79 kg/m      HEENT: Normocephalic and atraumatic   Cardiac: Not done  Back/Flank: Not done  CNS/PNS: Not done  Respiratory: Normal non-labored breathing  Abdomen: Soft nontender and nondistended  Peripheral Vascular: Not done  Mental Status: Not done    Penis: Not done  Scrotal Skin: Not done  Testicles: Not done  Epididymis: Not done  Digital Rectal Exam:     Cystoscopy: Not done    Imaging: None    Urinalysis: UA RESULTS:  Recent Labs   Lab Test  07/19/18   1133   COLOR  Yellow   APPEARANCE  Clear   URINEGLC  Negative   URINEBILI  Negative   URINEKETONE  Negative   SG  1.009   UBLD  Negative   URINEPH  8.0*   PROTEIN  Negative   NITRITE  Negative   LEUKEST  Negative       PSA: 3.27    Post Void Residual: 26ml    Other labs: None today      IMPRESSION:  62 y/o M with lower urinary tract symptoms now improved with alpha blocker     PLAN:  Continue alfuzosin   Uroflow/PVR in six months   PSA prior to next visit       Total Time: 10 minutes                                       Total in Consultation: greater than 50%       Of note, patient's PSA was elevated. Will repeat in two months to evaluate for true rise in PSA. Patient will follow up after PSA.       "

## 2019-03-04 NOTE — TELEPHONE ENCOUNTER
Here for post liver transplant follow-up.  As brought back for follow-up from last week's visit when he was complaining of abdominal pain.  He states that this pain is not better.  He took miralax bid because the once a day that was ordered wasn't making him feel any better.  The pain now radiates to his back.  He used all of his pain medication.  Dr. Echevarria has ordered abdominal CT and daily mag citrate.  I have asked Claribel to call the emergency line if pain is severe, come to our ER if has emesis and pain is severe.  I called his homecare nurse to let her know that he needs a prograf level    Complaints / Concerns:  I'm concerned that Claribel went ahead and doubled the medication prescribed without callling the after hours emergency line.  I asked him to please call when he is having problems.    Current immunosuppression:    Tacrolimus and cellcept    Med changes: none.  Updated patient's med card.    Lab frequency:  weekly    Follow-up:  Hepatology, derm and PCP annually.  Reviewed my contact information, now to reach the transplant office during weekday and afterhours.

## 2019-03-04 NOTE — PROGRESS NOTES
HPI      ROS      Physical Exam    Transplant Surgery -OUTPATIENT IMMUNOSUPPRESSION PROGRESS NOTE    Date of Visit: 2019    Transplants:  2018 (Liver); Postoperative day:  72  ASSESMENT AND PLAN:  1.Graft Function: Liver allograft: no rejection or technical problems.    2.Immunosuppression Management: keep tacrolimus level at 8-10  3.Hypertension: ok  4.Renal Function: good  5.Lab frequency: twice weekly  6.Other:  Complains of abdominal pain. colicky pain radiating to back    Date: 2019    Transplant:  [x]                             Liver [x]                              Kidney []                             Pancreas []                              Other:             Chief Complaint:  Has pain on the right side radiating to the back. Pain is colicky.   History of Present Illness:  Post liver transplant    Patient Active Problem List   Diagnosis     Cirrhosis of liver (H)     Liver lesion     HCC (hepatocellular carcinoma) (H)     Liver transplant recipient (H)     Immunosuppressed status (H)     Hypervolemia     Atrial fibrillation with rapid ventricular response (H)     Hematuria     Bilateral wheezing     Hypoxia     Hyperglycemia     Pre-diabetes     Essential hypertension     Constipation     Biliary stricture of transplanted liver (H)     Drug-induced diabetes mellitus (H)     SOCIAL /FAMILY HISTORY: [x]                  No recent change    Past Medical History:   Diagnosis Date     Biliary stricture of transplanted liver (H) 2018     Cancer (H)     hepatocellualr carcinoma     Cirrhosis of liver (H) 2018     Enlarged prostate      Inguinal hernia     Repaired with mesh on 18     Liver lesion 2018     Liver transplanted (H) 2018     donor liver transplant     Portal vein thrombosis     on path explant     Postoperative atrial fibrillation (H) 2018     Pre-diabetes 2018     Past Surgical History:   Procedure Laterality Date     APPENDECTOMY        COLONOSCOPY           ENDOSCOPIC RETROGRADE CHOLANGIOPANCREATOGRAM N/A 2018    Procedure: ENDOSCOPIC RETROGRADE CHOLANGIOPANCREATOGRAM, with Billary Sphincterotomy and Billary Stent Placement;  Surgeon: Omero Lawler MD;  Location: [facility]     ENDOSCOPIC RETROGRADE CHOLANGIOPANCREATOGRAM  2019    Procedure: COMBINED ENDOSCOPIC RETROGRADE CHOLANGIOPANCREATOGRAPHY, Bile Duct Stent Exchange;  Surgeon: Omero Lawler MD;  Location: [facility]     HERNIORRHAPHY INGUINAL  2018    Procedure: Herniorrhaphy inguinal;  Surgeon: Emil Echevarria MD;  Location: [facility]     IR LIVER BIOPSY PERCUTANEOUS  2018     TRANSPLANT LIVER RECIPIENT  DONOR N/A 2018    Procedure: TRANSPLANT LIVER RECIPIENT  DONOR;  Surgeon: Emil Echevarria MD;  Location: [facility] OR     Social History     Socioeconomic History     Marital status:      Spouse name: Not on file     Number of children: Not on file     Years of education: Not on file     Highest education level: Not on file   Occupational History     Not on file   Social Needs     Financial resource strain: Not on file     Food insecurity:     Worry: Not on file     Inability: Not on file     Transportation needs:     Medical: Not on file     Non-medical: Not on file   Tobacco Use     Smoking status: Former Smoker     Packs/day: 0.03     Years: 20.00     Pack years: 0.60     Types: Cigarettes, Cigars     Start date: 1970     Last attempt to quit: 3/14/2018     Years since quittin.9     Smokeless tobacco: Never Used     Tobacco comment: Quit smoking 2018, one cigarette after dinner   Substance and Sexual Activity     Alcohol use: No     Comment: Quit drinking 2018     Drug use: No     Sexual activity: Not on file   Lifestyle     Physical activity:     Days per week: Not on file     Minutes per session: Not on file     Stress: Not on file   Relationships     Social connections:     Talks on phone: Not on  file     Gets together: Not on file     Attends Orthodoxy service: Not on file     Active member of club or organization: Not on file     Attends meetings of clubs or organizations: Not on file     Relationship status: Not on file     Intimate partner violence:     Fear of current or ex partner: Not on file     Emotionally abused: Not on file     Physically abused: Not on file     Forced sexual activity: Not on file   Other Topics Concern     Parent/sibling w/ CABG, MI or angioplasty before 65F 55M? Not Asked   Social History Narrative    Born in [place], came to [place] years ago.     Has worked in [employer], [employer]     Prescription Medications as of 3/4/2019       Rx Number Disp Refills Start End Last Dispensed Date Next Fill Date Owning Pharmacy    alfuzosin ER (UROXATRAL) 10 MG 24 hr tablet  90 tablet 3 1/24/2019    [facility] [address]    Sig: Take 1 tablet (10 mg) by mouth daily    Class: E-Prescribe    Route: Oral    amLODIPine (NORVASC) 10 MG tablet  30 tablet 11 1/7/2019    [address]    Sig: Take 1 tablet (10 mg) by mouth daily    Class: E-Prescribe    Route: Oral    aspirin (ASA) 325 MG EC tablet  30 tablet 5 1/7/2019    [address]    Sig: Take 1 tablet (325 mg) by mouth daily    Class: E-Prescribe    Route: Oral    blood glucose (NO BRAND SPECIFIED) lancets standard  200 each 11 1/7/2019 1/7/2020   [address]    Sig: Use to test blood sugar 4 times daily or as directed.    Class: E-Prescribe    blood glucose (ONETOUCH VERIO IQ) test strip  200 strip 11 1/7/2019 1/7/2020   [address]    Sig: Use to test blood sugar 4 times daily or as directed.    Class: E-Prescribe    Notes to Pharmacy: [employer]  Verio Flex strips    glucose 40 % (400 mg/mL) GEL gel  30 g 3 1/7/2019    [address]    Sig: Take 15-30 g by mouth every 15 minutes as needed for low blood sugar    Class: E-Prescribe    Route: Oral    insulin pen needle (32G X 4 MM) 32G X 4 MM miscellaneous  100 each 3 1/7/2019 1/7/2020   [address]    Sig: Use once pen needles daily or as directed.    Class: E-Prescribe    isoniazid (NYDRAZID) 300 MG tablet  60 tablet 0 2/8/2019 4/9/2019   [address]    Sig: Take 1 tablet (300 mg) by mouth daily    Class: E-Prescribe    Route: Oral    metoprolol tartrate 75 MG TABS  60 tablet 11 1/7/2019    [address]    Sig: Take 75 mg by mouth 2 times daily    Class: E-Prescribe    Route: Oral    multivitamin w/minerals (THERA-VIT-M) tablet  90 tablet 3 1/24/2019    [facility] [address]    Sig: Take 1 tablet by mouth daily    Class: E-Prescribe    Route: Oral    mycophenolate (GENERIC EQUIVALENT) 250 MG capsule  120 capsule 11 2/28/2019    [facility]    Sig: Take 2 capsules (500 mg) by mouth 2 times daily    Class: E-Prescribe    Route: Oral    polyethylene glycol (MIRALAX/GLYCOLAX) packet  7 packet 1 2/26/2019 2/26/2020   [id number] [place], [place] - [address] [facility]    Sig: Take 17 g by mouth daily as needed for constipation    Class: E-Prescribe    Route: Oral    polyvinyl alcohol (LIQUIFILM TEARS) 1.4 % ophthalmic solution  15 mL 3 2/8/2019    [address]    Sig: Place 1 drop into both eyes as needed for dry eyes    Class: E-Prescribe    Route: Both  Eyes    Sharps Container MISC  1 each 2 2019    [address]    Si each daily    Class: E-Prescribe    Route: Does not apply    sulfamethoxazole-trimethoprim (BACTRIM/SEPTRA) 400-80 MG tablet  30 tablet 5 2019    [address]    Sig: Take 1 tablet by mouth daily    Class: E-Prescribe    Route: Oral    tacrolimus (GENERIC EQUIVALENT) 1 MG capsule  360 capsule 3 2019    [facility]/[address]    Sig: Take 6 capsules (6 mg) by mouth 2 times daily    Class: E-Prescribe    Route: Oral    traMADol (ULTRAM) 50 MG tablet  30 tablet 0 2019 3/7/2019   [address]    Sig: Take 1 tablet (50 mg) by mouth every 6 hours as needed for severe pain    Class: Local Print    Route: Oral    urea (GORMEL) 20 % external cream  480 g 5 2019    [address]    Sig: Apply as a moisturizer to your whole body everyday.    Class: E-Prescribe    ursodiol (ACTIGALL) 250 MG tablet  60 tablet 11 2019    [address]    Sig: Take 1 tablet (250 mg) by mouth 2 times daily    Class: E-Prescribe    Route: Oral    valGANciclovir (VALCYTE) 450 MG tablet  69 tablet 0 2019    [address]    Sig: Take 2 tablets (900 mg) by mouth daily    Class: E-Prescribe    Route: Oral    vitamin B6 (PYRIDOXINE) 50 MG TABS  60 tablet 0 2019   [address]    Sig: Take 1 tablet (50 mg) by mouth daily    Class: E-Prescribe    Route: Oral    ciprofloxacin (CIPRO) 500 MG tablet (Ended)  10 tablet 0 2019   [facility] - [place], [place] - [address] [facility]    Sig: Take 1 tablet (500 mg) by mouth 2 times daily for 5 days    Class: E-Prescribe    Route: Oral    senna-docusate (SENOKOT-S/PERICOLACE) 8.6-50 MG tablet (Ended)  14 tablet 1 2/26/2019 3/2/2019   [id number] - [place], [place] - [address] [facility]    Sig: Take 2 tablets by mouth 2 times daily for 7 doses    Class: E-Prescribe    Route: Oral      Clinic-Administered Medications as of 3/4/2019       Dose Frequency Start End    aluminum chloride (DRYSOL) 20 % external solution  ONCE 2/20/2019     Sig: Apply topically once    Route: Topical        Patient has no known allergies.   REVIEW OF SYSTEMS (check box if normal)  [x]               GENERAL  [x]                 PULMONARY [x]                GENITOURINARY  [x]                CNS                 [x]                 CARDIAC  [x]                 ENDOCRINE  [x]                EARS,NOSE,THROAT [x]                 GASTROINTESTINAL [x]                 NEUROLOGIC    [x]                MUSCLOSKELTAL  [x]                  HEMATOLOGY      PHYSICAL EXAM (check box if normal)/74   Pulse 54   Wt 76.3 kg (168 lb 4.8 oz)   SpO2 98%   BMI 27.16 kg/m          [x]            GENERAL:    [x]       EYES:  ICTERIC   []        YES  []                    NO  [x]           EXTREMITIES: Clubbing []       Y     [x]           N    [x]           EARS, NOSE, THROAT: Membranes Moist    YES   [x]                   NO []                  [x]           LUNGS:  CLEAR    YES       [x]                  NO    []                                [x]           SKIN: Jaundice           YES       []                  NO    [x]                   Rash: YES       []                  NO    [x]                                     [x]             HEART: Regular Rate          YES       [x]                  NO    []                   Incision Clean:  YES       [x]                  NO    []                                 [x]                    ABDOMEN: Wound healthy Organomegaly YES       []                  NO    [x]                       [x]                    NEUROLOGICAL:  Nonfocal  YES       [x]                  NO    []                       [x]                    Hernia YES       []                  NO    [x]                   PSYCHIATRIC:  Appropriate  YES       [x]                  NO    []                       OTHER:                                                                                                   PAIN SCALE:: 3

## 2019-03-04 NOTE — DISCHARGE INSTRUCTIONS

## 2019-03-05 ENCOUNTER — TELEPHONE (OUTPATIENT)
Dept: TRANSPLANT | Facility: CLINIC | Age: 64
End: 2019-03-05

## 2019-03-05 LAB
TACROLIMUS BLD-MCNC: 11.8 UG/L (ref 5–15)
TME LAST DOSE: NORMAL H

## 2019-03-05 PROCEDURE — 80197 ASSAY OF TACROLIMUS: CPT | Performed by: TRANSPLANT SURGERY

## 2019-03-05 NOTE — TELEPHONE ENCOUNTER
Call to [facility] to check on him today and see if his pain is better.  He did drink the mag citrate yesterday - it made him feel worse but overall he feels better today.  He is reluctant to drink a 2nd bottle but he will try.

## 2019-03-07 ENCOUNTER — TELEPHONE (OUTPATIENT)
Dept: TRANSPLANT | Facility: CLINIC | Age: 64
End: 2019-03-07

## 2019-03-07 ENCOUNTER — DOCUMENTATION ONLY (OUTPATIENT)
Dept: FAMILY MEDICINE | Facility: CLINIC | Age: 64
End: 2019-03-07

## 2019-03-07 NOTE — PROGRESS NOTES
[facility] now requests orders and shares plan of care/discharge summaries for some patients through [employer].  Please REPLY TO THIS MESSAGE OR ROUTE BACK TO THE AUTHOR in order to give authorization for orders when needed.  This is considered a verbal order, you will still receive a faxed copy of orders for signature.  Thank you for your assistance in improving collaboration for our patients.    ORDER    RCT for Home Care services completed.  Requesting the following Home Care orders:    Skilled Nursing 1w4, 3 PRN     Thank you,  René Curran RN Case Manager   [phone]  [email or web address]

## 2019-03-08 ENCOUNTER — TELEPHONE (OUTPATIENT)
Dept: FAMILY MEDICINE | Facility: CLINIC | Age: 64
End: 2019-03-08

## 2019-03-08 ENCOUNTER — TEAM CONFERENCE (OUTPATIENT)
Dept: TRANSPLANT | Facility: CLINIC | Age: 64
End: 2019-03-08

## 2019-03-08 NOTE — TELEPHONE ENCOUNTER
[facility] Call Center    Phone Message    May a detailed message be left on voicemail: yes    Reason for Call: URGENT   Order(s): Home Care Orders: Other: Skilled Nurse 1x a week for 4 weeks, 3 PRN visits     Please call Anselmo at the number provided she stated she submitted these orders yesterday and has not heard back from provider. Verbal orders requested.      Action Taken: Message routed to:  [facility] PCC

## 2019-03-08 NOTE — TELEPHONE ENCOUNTER
Routed orders over via documentation. Will close this encounter. Lala Beaulieu LPN 3/8/2019 4:23 PM

## 2019-03-08 NOTE — TELEPHONE ENCOUNTER
Post Liver Transplant Team Conference  Date: 3/8/2019  Transplant Coordinator: Jeny Szymanski  Transplant Surgeon: Emil Echevarria      Attendees:  [] Dr. Harris [] Dr. England []       [x] Dr. Echevarria [] Brennan Chung LPN []    [] Dr. Carballo [] Camille Patel NP []    [] Dr. Field [] Nina Gallegos NP []    [] Dr. De La Cruz [] Noelle Carlton, [place] []       [] Dr. Juarez [x] Izabela Oropeza, [place] []       [] Dr. Des Valdes [x] Jeny Szymanski, RN []       [] Dr. Colvin [x] Lisa White RN []       [] Dr. Soria [] Becky Cruz, [place] []       [] Dr. Sharif [] Lucio Freeman, [place] []         Verbal Plan Read Back:   We saw Claribel in clinic last week and this week.  He was c/o moderate to severe right upper quadrant pain.  We did an xray last week that showed lots of stool in right colon.  We had asked himto take miralax, he did but still c/o pain on 3/4.  Dr. JUÁREZ asked him to drink mag citrate daily x 3, he did , had good results and is feeling better.   We noted an elevated PSA (5.25) in his lab results.  This was ordered by Dr. Torres when she saw him on 3/1.  I sent Dr. Torres a message asking her to review, assuring that she is aware.    Routed to RN Coordinator   Jeny Szymanski

## 2019-03-09 ENCOUNTER — MEDICAL CORRESPONDENCE (OUTPATIENT)
Dept: HEALTH INFORMATION MANAGEMENT | Facility: CLINIC | Age: 64
End: 2019-03-09

## 2019-03-11 ENCOUNTER — TELEPHONE (OUTPATIENT)
Dept: TRANSPLANT | Facility: CLINIC | Age: 64
End: 2019-03-11

## 2019-03-11 ENCOUNTER — OFFICE VISIT (OUTPATIENT)
Dept: TRANSPLANT | Facility: CLINIC | Age: 64
End: 2019-03-11
Attending: TRANSPLANT SURGERY
Payer: MEDICARE

## 2019-03-11 VITALS
WEIGHT: 167.2 LBS | DIASTOLIC BLOOD PRESSURE: 68 MMHG | HEIGHT: 66 IN | TEMPERATURE: 97.6 F | SYSTOLIC BLOOD PRESSURE: 161 MMHG | HEART RATE: 79 BPM | OXYGEN SATURATION: 98 % | BODY MASS INDEX: 26.87 KG/M2

## 2019-03-11 DIAGNOSIS — Z94.4 LIVER TRANSPLANT RECIPIENT (H): ICD-10-CM

## 2019-03-11 DIAGNOSIS — K59.00 CONSTIPATION: Primary | ICD-10-CM

## 2019-03-11 DIAGNOSIS — Z94.4 LIVER TRANSPLANTED (H): Primary | ICD-10-CM

## 2019-03-11 PROCEDURE — T1013 SIGN LANG/ORAL INTERPRETER: HCPCS | Mod: U3,ZF

## 2019-03-11 PROCEDURE — G0463 HOSPITAL OUTPT CLINIC VISIT: HCPCS | Mod: ZF

## 2019-03-11 RX ORDER — MYCOPHENOLATE MOFETIL 250 MG/1
CAPSULE ORAL
Qty: 120 CAPSULE | Refills: 11 | Status: ON HOLD | OUTPATIENT
Start: 2019-03-11 | End: 2019-04-29

## 2019-03-11 ASSESSMENT — PAIN SCALES - GENERAL: PAINLEVEL: MILD PAIN (2)

## 2019-03-11 ASSESSMENT — MIFFLIN-ST. JEOR: SCORE: 1486.16

## 2019-03-11 NOTE — NURSING NOTE
"Chief Complaint   Patient presents with     Surgical Followup     Liver follow      Blood pressure 161/68, pulse 79, temperature 97.6  F (36.4  C), temperature source Oral, height 1.676 m (5' 6\"), weight 75.8 kg (167 lb 3.2 oz), SpO2 98 %.    Jerilyn Arizmendi CMA on 3/11/2019 at 9:13 AM    "

## 2019-03-11 NOTE — LETTER
March 11, 2019        To Whom It May Concern,       Loy Limon had a liver transplant on 12/22/2018.  He is doing well and we are authorizing him to return to work on April 15, 2019 for up to 2 hours per day.  He has a 10 pound lifting restriction with minimal repetaitive bending until 5/22/2019. He also needs to be excused from work at 8:00a.m. To 10:00a.m. 1 day per week for lab testing and for all appointments with physicians as scheduled.       If you have questions, please contact Jeny Szymanski RN at [phone].    Sincerely,        Emil Echevarria MD, JOSÉ LUIS  Professor of Surgery  [employer]  Surgical Director of Liver Transplant Program  Executive Medical Director of Pediatric Transplantation        Emil Echevarria  Professor of Surgery  Transplant Surgery

## 2019-03-11 NOTE — PROGRESS NOTES
HPI      ROS      Physical Exam    Transplant Surgery -OUTPATIENT IMMUNOSUPPRESSION PROGRESS NOTE    Date of Visit: 2019    Transplants:  2018 (Liver); Postoperative day:  79  ASSESMENT AND PLAN:  1.Graft Function:Liver allograft: no rejection or technical problems.    2.Immunosuppression Management: keep tacrolimus levels at 10, wean cellcept  3.Hypertension: better  4.Renal Function: good   5.Lab frequency: weekly  6.Other:  Pain abdomen, CT scan negative, recommend mag citrate for 3 days  Transition to hepatology     Date: 2019    Transplant:  [x]                             Liver [x]                              Kidney []                             Pancreas []                              Other:             Chief Complaint:Surgical Followup (Liver follow )  abdominal pain radiating to the right back is improving  History of Present Illness:  Post liver transplant  Patient Active Problem List   Diagnosis     Cirrhosis of liver (H)     Liver lesion     HCC (hepatocellular carcinoma) (H)     Liver transplant recipient (H)     Immunosuppressed status (H)     Hypervolemia     Atrial fibrillation with rapid ventricular response (H)     Hematuria     Bilateral wheezing     Hypoxia     Hyperglycemia     Pre-diabetes     Essential hypertension     Constipation     Biliary stricture of transplanted liver (H)     Drug-induced diabetes mellitus (H)     SOCIAL /FAMILY HISTORY: [x]                  No recent change    Past Medical History:   Diagnosis Date     Biliary stricture of transplanted liver (H) 2018     Cancer (H)     hepatocellualr carcinoma     Cirrhosis of liver (H) 2018     Enlarged prostate      Inguinal hernia     Repaired with mesh on 18     Liver lesion 2018     Liver transplanted (H) 2018     donor liver transplant     Portal vein thrombosis     on path explant     Postoperative atrial fibrillation (H) 2018     Pre-diabetes 2018     Past  Surgical History:   Procedure Laterality Date     APPENDECTOMY       COLONOSCOPY           ENDOSCOPIC RETROGRADE CHOLANGIOPANCREATOGRAM N/A 2018    Procedure: ENDOSCOPIC RETROGRADE CHOLANGIOPANCREATOGRAM, with Billary Sphincterotomy and Billary Stent Placement;  Surgeon: Omero Lawler MD;  [facility]     ENDOSCOPIC RETROGRADE CHOLANGIOPANCREATOGRAM  2019    Procedure: COMBINED ENDOSCOPIC RETROGRADE CHOLANGIOPANCREATOGRAPHY, Bile Duct Stent Exchange;  Surgeon: Omero Lawler MD;  [facility]     HERNIORRHAPHY INGUINAL  2018    Procedure: Herniorrhaphy inguinal;  Surgeon: Emil Echevarria MD;  [facility]     IR LIVER BIOPSY PERCUTANEOUS  2018     TRANSPLANT LIVER RECIPIENT  DONOR N/A 2018    Procedure: TRANSPLANT LIVER RECIPIENT  DONOR;  Surgeon: Emil Echevarria MD;  Location: [facility]     Social History     Socioeconomic History     Marital status:      Spouse name: Not on file     Number of children: Not on file     Years of education: Not on file     Highest education level: Not on file   Occupational History     Not on file   Social Needs     Financial resource strain: Not on file     Food insecurity:     Worry: Not on file     Inability: Not on file     Transportation needs:     Medical: Not on file     Non-medical: Not on file   Tobacco Use     Smoking status: Former Smoker     Packs/day: 0.03     Years: 20.00     Pack years: 0.60     Types: Cigarettes, Cigars     Start date: 1970     Last attempt to quit: 3/14/2018     Years since quittin.9     Smokeless tobacco: Never Used     Tobacco comment: Quit smoking 2018, one cigarette after dinner   Substance and Sexual Activity     Alcohol use: No     Comment: Quit drinking 2018     Drug use: No     Sexual activity: Not on file   Lifestyle     Physical activity:     Days per week: Not on file     Minutes per session: Not on file     Stress: Not on file    Relationships     Social connections:     Talks on phone: Not on file     Gets together: Not on file     Attends Synagogue service: Not on file     Active member of club or organization: Not on file     Attends meetings of clubs or organizations: Not on file     Relationship status: Not on file     Intimate partner violence:     Fear of current or ex partner: Not on file     Emotionally abused: Not on file     Physically abused: Not on file     Forced sexual activity: Not on file   Other Topics Concern     Parent/sibling w/ CABG, MI or angioplasty before 65F 55M? Not Asked   Social History Narrative    Born in [place], came to [place] ago.     Has worked in [employer], [employer]     Prescription Medications as of 3/11/2019       Rx Number Disp Refills Start End Last Dispensed Date Next Fill Date Owning Pharmacy    alfuzosin ER (UROXATRAL) 10 MG 24 hr tablet  90 tablet 3 1/24/2019    [facility] [address] [facility]    Sig: Take 1 tablet (10 mg) by mouth daily    Class: E-Prescribe    Route: Oral    amLODIPine (NORVASC) 10 MG tablet  30 tablet 11 1/7/2019    [address]    Sig: Take 1 tablet (10 mg) by mouth daily    Class: E-Prescribe    Route: Oral    aspirin (ASA) 325 MG EC tablet  30 tablet 5 1/7/2019    [address]    Sig: Take 1 tablet (325 mg) by mouth daily    Class: E-Prescribe    Route: Oral    blood glucose (NO BRAND SPECIFIED) lancets standard  200 each 11 1/7/2019 1/7/2020   [address]    Sig: Use to test blood sugar 4 times daily or as directed.    Class: E-Prescribe    blood glucose (ONETOUCH VERIO IQ) test strip  200 strip 11 1/7/2019 1/7/2020   [address]    Sig: Use to test blood sugar 4 times daily or  as directed.    Class: E-Prescribe    Notes to Pharmacy: Onetouch Verio Flex strips    glucose 40 % (400 mg/mL) GEL gel  30 g 3 1/7/2019    [address]    Sig: Take 15-30 g by mouth every 15 minutes as needed for low blood sugar    Class: E-Prescribe    Route: Oral    insulin pen needle (32G X 4 MM) 32G X 4 MM miscellaneous  100 each 3 1/7/2019 1/7/2020   [address]    Sig: Use once pen needles daily or as directed.    Class: E-Prescribe    isoniazid (NYDRAZID) 300 MG tablet  60 tablet 0 2/8/2019 4/9/2019   [address]    Sig: Take 1 tablet (300 mg) by mouth daily    Class: E-Prescribe    Route: Oral    magnesium citrate solution  296 mL 0 3/4/2019    [address]    Sig: Take 296 mLs by mouth daily    Class: E-Prescribe    Route: Oral    metoprolol tartrate 75 MG TABS  60 tablet 11 1/7/2019    [address]    Sig: Take 75 mg by mouth 2 times daily    Class: E-Prescribe    Route: Oral    multivitamin w/minerals (THERA-VIT-M) tablet  90 tablet 3 1/24/2019    [facility] [address]    Sig: Take 1 tablet by mouth daily    Class: E-Prescribe    Route: Oral    mycophenolate (GENERIC EQUIVALENT) 250 MG capsule  120 capsule 11 2/28/2019    [facility] [address]    Sig: Take 2 capsules (500 mg) by mouth 2 times daily    Class: E-Prescribe    Route: Oral    polyethylene glycol (MIRALAX/GLYCOLAX) packet  7 packet 1 2/26/2019 2/26/2020   [id number] - [place], [place] - [address] [facility]    Sig: Take 17 g by mouth daily as needed for constipation    Class: E-Prescribe     Route: Oral    polyvinyl alcohol (LIQUIFILM TEARS) 1.4 % ophthalmic solution  15 mL 3 2019    [address]    Sig: Place 1 drop into both eyes as needed for dry eyes    Class: E-Prescribe    Route: Both Eyes    Sharps Container MISC  1 each 2 2019    [address]    Si each daily    Class: E-Prescribe    Route: Does not apply    sulfamethoxazole-trimethoprim (BACTRIM/SEPTRA) 400-80 MG tablet  30 tablet 5 2019    [address]    Sig: Take 1 tablet by mouth daily    Class: E-Prescribe    Route: Oral    tacrolimus (GENERIC EQUIVALENT) 1 MG capsule  360 capsule 3 2019    [facility]    Sig: Take 6 capsules (6 mg) by mouth 2 times daily    Class: E-Prescribe    Route: Oral    urea (GORMEL) 20 % external cream  480 g 5 2019    [address]    Sig: Apply as a moisturizer to your whole body everyday.    Class: E-Prescribe    ursodiol (ACTIGALL) 250 MG tablet  60 tablet 11 2019    [address]    Sig: Take 1 tablet (250 mg) by mouth 2 times daily    Class: E-Prescribe    Route: Oral    valGANciclovir (VALCYTE) 450 MG tablet  69 tablet 0 2019    [facility]    Sig: Take 2 tablets (900 mg) by mouth daily    Class: E-Prescribe    Route: Oral    vitamin B6 (PYRIDOXINE) 50 MG TABS  60 tablet 0 2019   [address]    Sig: Take 1 tablet (50 mg) by mouth daily    Class: E-Prescribe    Route: Oral    ciprofloxacin (CIPRO) 500 MG tablet (Ended)  10 tablet 0 2019   [facility] - [place], [place] - [address] [facility] [address]    Sig: Take 1 tablet (500 mg) by mouth 2 times daily for 5 days    Class: E-Prescribe    Route: Oral    senna-docusate (SENOKOT-S/PERICOLACE) 8.6-50 MG tablet (Ended)  14 tablet 1 2/26/2019 3/2/2019   [id number] [place], [place] - [address] [facility]    Sig: Take 2 tablets by mouth 2 times daily for 7 doses    Class: E-Prescribe    Route: Oral    traMADol (ULTRAM) 50 MG tablet (Ended)  30 tablet 0 2/25/2019 3/7/2019   [place], [place] - [address] [facility] [address]    Sig: Take 1 tablet (50 mg) by mouth every 6 hours as needed for severe pain    Class: Local Print    Route: Oral      Clinic-Administered Medications as of 3/11/2019       Dose Frequency Start End    aluminum chloride (DRYSOL) 20 % external solution  ONCE 2/20/2019     Sig: Apply topically once    Route: Topical        Patient has no known allergies.   REVIEW OF SYSTEMS (check box if normal)  [x]               GENERAL  [x]                 PULMONARY [x]                GENITOURINARY  [x]                CNS                 [x]                 CARDIAC  [x]                 ENDOCRINE  [x]                EARS,NOSE,THROAT [x]                 GASTROINTESTINAL [x]                 NEUROLOGIC    [x]                MUSCLOSKELTAL  [x]                  HEMATOLOGY      PHYSICAL EXAM (check box if normal)/68   Pulse 79   Temp 97.6  F (36.4  C) (Oral)   Ht 1.676 m (5' 6\")   Wt 75.8 kg (167 lb 3.2 oz)   SpO2 98%   BMI 26.99 kg/m          [x]            GENERAL:    [x]       EYES:  ICTERIC   []        YES  []                    NO  [x]           EXTREMITIES: Clubbing []       Y     [x]           N    [x]           EARS, NOSE, THROAT: Membranes Moist    YES   [x]                   NO []                  [x]           LUNGS:  CLEAR    YES       [x]                  NO    []                                [x]           " SKIN: Jaundice           YES       []                  NO    [x]                   Rash: YES       []                  NO    [x]                                     [x]             HEART: Regular Rate          YES       [x]                  NO    []                   Incision Clean:  YES       [x]                  NO    []                                [x]                    ABDOMEN: Wound healthy Organomegaly YES       []                  NO    [x]                       [x]                    NEUROLOGICAL:  Nonfocal  YES       [x]                  NO    []                       [x]                    Hernia YES       []                  NO    [x]                   PSYCHIATRIC:  Appropriate  YES       [x]                  NO    []                       OTHER:                                                                                                   PAIN SCALE:: 3

## 2019-03-11 NOTE — LETTER
3/11/2019       RE: Loy Limon  [address]  [facility] [id number]     Dear Colleague,    Thank you for referring your patient, Loy Limon, to the [facility]GAN TRANSPL[facility] at [facility]. Please see a copy of my visit note below.    Transplant Surgery -OUTPATIENT IMMUNOSUPPRESSION PROGRESS NOTE    Date of Visit: 03/11/2019    Transplants:  12/22/2018 (Liver); Postoperative day:  79  ASSESMENT AND PLAN:  1.Graft Function:Liver allograft: no rejection or technical problems.    2.Immunosuppression Management: keep tacrolimus levels at 10, wean cellcept  3.Hypertension: better  4.Renal Function: good   5.Lab frequency: weekly  6.Other:  Pain abdomen, CT scan negative, recommend mag citrate for 3 days  Transition to hepatology     Date: March 11, 2019    Transplant:  [x]                             Liver [x]                              Kidney []                             Pancreas []                              Other:             Chief Complaint:Surgical Followup (Liver follow )  abdominal pain radiating to the right back is improving  History of Present Illness:  Post liver transplant  Patient Active Problem List   Diagnosis     Cirrhosis of liver (H)     Liver lesion     HCC (hepatocellular carcinoma) (H)     Liver transplant recipient (H)     Immunosuppressed status (H)     Hypervolemia     Atrial fibrillation with rapid ventricular response (H)     Hematuria     Bilateral wheezing     Hypoxia     Hyperglycemia     Pre-diabetes     Essential hypertension     Constipation     Biliary stricture of transplanted liver (H)     Drug-induced diabetes mellitus (H)     SOCIAL /FAMILY HISTORY: [x]                  No recent change    Past Medical History:   Diagnosis Date     Biliary stricture of transplanted liver (H) 12/28/2018     Cancer (H)     hepatocellualr carcinoma     Cirrhosis of liver (H) 5/8/2018     Enlarged prostate      Inguinal hernia      Repaired with mesh on 18     Liver lesion 2018     Liver transplanted (H) 2018     donor liver transplant     Portal vein thrombosis     on path explant     Postoperative atrial fibrillation (H) 2018     Pre-diabetes 2018     Past Surgical History:   Procedure Laterality Date     APPENDECTOMY       COLONOSCOPY           ENDOSCOPIC RETROGRADE CHOLANGIOPANCREATOGRAM N/A 2018    Procedure: ENDOSCOPIC RETROGRADE CHOLANGIOPANCREATOGRAM, with Billary Sphincterotomy and Billary Stent Placement;  Surgeon: Omero Lawler MD;  [facility]     ENDOSCOPIC RETROGRADE CHOLANGIOPANCREATOGRAM  2019    Procedure: COMBINED ENDOSCOPIC RETROGRADE CHOLANGIOPANCREATOGRAPHY, Bile Duct Stent Exchange;  Surgeon: Omero Lawler MD;  [facility]     HERNIORRHAPHY INGUINAL  2018    Procedure: Herniorrhaphy inguinal;  Surgeon: Emil Echevarria MD;  [facility]     IR LIVER BIOPSY PERCUTANEOUS  2018     TRANSPLANT LIVER RECIPIENT  DONOR N/A 2018    Procedure: TRANSPLANT LIVER RECIPIENT  DONOR;  Surgeon: Emil Echevarria MD;  Location: [facility]     Social History     Socioeconomic History     Marital status:      Spouse name: Not on file     Number of children: Not on file     Years of education: Not on file     Highest education level: Not on file   Occupational History     Not on file   Social Needs     Financial resource strain: Not on file     Food insecurity:     Worry: Not on file     Inability: Not on file     Transportation needs:     Medical: Not on file     Non-medical: Not on file   Tobacco Use     Smoking status: Former Smoker     Packs/day: 0.03     Years: 20.00     Pack years: 0.60     Types: Cigarettes, Cigars     Start date: 1970     Last attempt to quit: 3/14/2018     Years since quittin.9     Smokeless tobacco: Never Used     Tobacco comment: Quit smoking 2018, one cigarette after dinner    Substance and Sexual Activity     Alcohol use: No     Comment: Quit drinking Feb 2018     Drug use: No     Sexual activity: Not on file   Lifestyle     Physical activity:     Days per week: Not on file     Minutes per session: Not on file     Stress: Not on file   Relationships     Social connections:     Talks on phone: Not on file     Gets together: Not on file     Attends [employer] service: Not on file     Active member of club or organization: Not on file     Attends meetings of clubs or organizations: Not on file     Relationship status: Not on file     Intimate partner violence:     Fear of current or ex partner: Not on file     Emotionally abused: Not on file     Physically abused: Not on file     Forced sexual activity: Not on file   Other Topics Concern     Parent/sibling w/ CABG, MI or angioplasty before 65F 55M? Not Asked   Social History Narrative    Born in [place], [place].     Has worked in [employer], [employer]     Prescription Medications as of 3/11/2019       Rx Number Disp Refills Start End Last Dispensed Date Next Fill Date Owning Pharmacy    alfuzosin ER (UROXATRAL) 10 MG 24 hr tablet  90 tablet 3 1/24/2019    [facility] - [address]    Sig: Take 1 tablet (10 mg) by mouth daily    Class: E-Prescribe    Route: Oral    amLODIPine (NORVASC) 10 MG tablet  30 tablet 11 1/7/2019    [facility] [address]    Sig: Take 1 tablet (10 mg) by mouth daily    Class: E-Prescribe    Route: Oral    aspirin (ASA) 325 MG EC tablet  30 tablet 5 1/7/2019    [facility] [address]    Sig: Take 1 tablet (325 mg) by mouth daily    Class: E-Prescribe    Route: Oral    blood glucose (NO BRAND SPECIFIED) lancets standard  200 each 11 1/7/2019 1/7/2020   [facility] [address]    Sig: Use to test  blood sugar 4 times daily or as directed.    Class: E-Prescribe    blood glucose (ONETOUCH VERIO IQ) test strip  200 strip 11 1/7/2019 1/7/2020   [address]    Sig: Use to test blood sugar 4 times daily or as directed.    Class: E-Prescribe    Notes to Pharmacy: Onetouch Verio Flex strips    glucose 40 % (400 mg/mL) GEL gel  30 g 3 1/7/2019    [address]    Sig: Take 15-30 g by mouth every 15 minutes as needed for low blood sugar    Class: E-Prescribe    Route: Oral    insulin pen needle (32G X 4 MM) 32G X 4 MM miscellaneous  100 each 3 1/7/2019 1/7/2020   [address]    Sig: Use once pen needles daily or as directed.    Class: E-Prescribe    isoniazid (NYDRAZID) 300 MG tablet  60 tablet 0 2/8/2019 4/9/2019   [address]    Sig: Take 1 tablet (300 mg) by mouth daily    Class: E-Prescribe    Route: Oral    magnesium citrate solution  296 mL 0 3/4/2019    [address]    Sig: Take 296 mLs by mouth daily    Class: E-Prescribe    Route: Oral    metoprolol tartrate 75 MG TABS  60 tablet 11 1/7/2019    [address]    Sig: Take 75 mg by mouth 2 times daily    Class: E-Prescribe    Route: Oral    multivitamin w/minerals (THERA-VIT-M) tablet  90 tablet 3 1/24/2019    [facility] [address]    Sig: Take 1 tablet by mouth daily    Class: E-Prescribe    Route: Oral    mycophenolate (GENERIC EQUIVALENT) 250 MG capsule  120 capsule 11 2/28/2019    [facility] [address]    Sig: Take 2 capsules (500 mg) by mouth 2 times daily    Class: E-Prescribe    Route: Oral     polyethylene glycol (MIRALAX/GLYCOLAX) packet  7 packet 1 2019   [facility] - [place], [place] - [address] [facility]    Sig: Take 17 g by mouth daily as needed for constipation    Class: E-Prescribe    Route: Oral    polyvinyl alcohol (LIQUIFILM TEARS) 1.4 % ophthalmic solution  15 mL 3 2019    [address]    Sig: Place 1 drop into both eyes as needed for dry eyes    Class: E-Prescribe    Route: Both Eyes    Sharps Container MISC  1 each 2 2019    [address]    Si each daily    Class: E-Prescribe    Route: Does not apply    sulfamethoxazole-trimethoprim (BACTRIM/SEPTRA) 400-80 MG tablet  30 tablet 5 2019    [address]    Sig: Take 1 tablet by mouth daily    Class: E-Prescribe    Route: Oral    tacrolimus (GENERIC EQUIVALENT) 1 MG capsule  360 capsule 3 2019    [facility] [address] [facility]    Sig: Take 6 capsules (6 mg) by mouth 2 times daily    Class: E-Prescribe    Route: Oral    urea (GORMEL) 20 % external cream  480 g 5 2019    [address]    Sig: Apply as a moisturizer to your whole body everyday.    Class: E-Prescribe    ursodiol (ACTIGALL) 250 MG tablet  60 tablet 11 2019    [address]    Sig: Take 1 tablet (250 mg) by mouth 2 times daily    Class: E-Prescribe    Route: Oral    valGANciclovir (VALCYTE) 450 MG tablet  69 tablet 0 2019    [facility] [address] [facility]    Sig: Take 2 tablets (900 mg) by mouth daily    Class: E-Prescribe    Route: Oral    vitamin B6 (PYRIDOXINE) 50 MG TABS  60 tablet 0 2019    "[place], [place] - [address] [facility] [address]    Sig: Take 1 tablet (50 mg) by mouth daily    Class: E-Prescribe    Route: Oral    ciprofloxacin (CIPRO) 500 MG tablet (Ended)  10 tablet 0 2/18/2019 2/23/2019   [place], [place] - [address]    Sig: Take 1 tablet (500 mg) by mouth 2 times daily for 5 days    Class: E-Prescribe    Route: Oral    senna-docusate (SENOKOT-S/PERICOLACE) 8.6-50 MG tablet (Ended)  14 tablet 1 2/26/2019 3/2/2019   [facility] - [place], [place] - [address] [facility]    Sig: Take 2 tablets by mouth 2 times daily for 7 doses    Class: E-Prescribe    Route: Oral    traMADol (ULTRAM) 50 MG tablet (Ended)  30 tablet 0 2/25/2019 3/7/2019   [place], [place] - [address] [facility] [address]    Sig: Take 1 tablet (50 mg) by mouth every 6 hours as needed for severe pain    Class: Local Print    Route: Oral      Clinic-Administered Medications as of 3/11/2019       Dose Frequency Start End    aluminum chloride (DRYSOL) 20 % external solution  ONCE 2/20/2019     Sig: Apply topically once    Route: Topical        Patient has no known allergies.   REVIEW OF SYSTEMS (check box if normal)  [x]               GENERAL  [x]                 PULMONARY [x]                GENITOURINARY  [x]                CNS                 [x]                 CARDIAC  [x]                 ENDOCRINE  [x]                EARS,NOSE,THROAT [x]                 GASTROINTESTINAL [x]                 NEUROLOGIC    [x]                MUSCLOSKELTAL  [x]                  HEMATOLOGY      PHYSICAL EXAM (check box if normal)/68   Pulse 79   Temp 97.6  F (36.4  C) (Oral)   Ht 1.676 m (5' 6\")   Wt 75.8 kg (167 lb 3.2 oz)   SpO2 98%   BMI 26.99 kg/m           [x]            GENERAL:    [x]       EYES:  ICTERIC   []        YES  []                    NO  [x]           EXTREMITIES: Clubbing " []       Y     [x]           N    [x]           EARS, NOSE, THROAT: Membranes Moist    YES   [x]                   NO []                  [x]           LUNGS:  CLEAR    YES       [x]                  NO    []                                [x]           SKIN: Jaundice           YES       []                  NO    [x]                   Rash: YES       []                  NO    [x]                                     [x]             HEART: Regular Rate          YES       [x]                  NO    []                   Incision Clean:  YES       [x]                  NO    []                                [x]                    ABDOMEN: Wound healthy Organomegaly YES       []                  NO    [x]                       [x]                    NEUROLOGICAL:  Nonfocal  YES       [x]                  NO    []                       [x]                    Hernia YES       []                  NO    [x]                   PSYCHIATRIC:  Appropriate  YES       [x]                  NO    []                       OTHER:                                                                                                   PAIN SCALE:: 3    Again, thank you for allowing me to participate in the care of your patient.      Sincerely,    Emil Echevarria MD

## 2019-03-11 NOTE — TELEPHONE ENCOUNTER
Here for post liver transplant follow-up related to abdominal pain.  I was only present for the end of the appointmentPain is better but still present. Dr. Echevarria suggests 1 bottle mag citrate daily x 3.  Script sent. Dr. Echevarria also suggesting that Claribel consider returning to work.  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  Claribel and I talked about what he thinks he can do, I gave him a letter outlining restrictions.   We will begin weaning cellcept.  I asked Claribel to take 250 mg daily for a week, then 250 mg daily for a week, then stop.  I gave him written instructions w/ the assistance of an .  Claribel verified understanding.   Dr. Echevarria would like Claribel to have an upper endoscopy if pain doesn't get better.    Claribel was unable to have labs drawn here this morning so I will call Destiny ([facility]) to go in to draw labs tomorrow or Wednesday.    [facility] nurse (Amna) aware of all of the above.  Reviewed recent labs and assisted with interpretation.     Complaints / Concerns:  Abdominal pain    Current immunosuppression:    Prograf and weaning cellcept    Med changes: weaning .  Updated patient's med card.    Lab frequency:  weekly    Follow-up:  Hepatology, derm and PCP annually.  Reviewed my contact information, now to reach the transplant office during weekday and afterhours.

## 2019-03-12 ENCOUNTER — TELEPHONE (OUTPATIENT)
Dept: TRANSPLANT | Facility: CLINIC | Age: 64
End: 2019-03-12

## 2019-03-12 LAB
ALBUMIN SERPL-MCNC: 3.7 G/DL (ref 3.4–5)
ALP SERPL-CCNC: 189 U/L (ref 40–150)
ALT SERPL W P-5'-P-CCNC: 14 U/L (ref 0–70)
ANION GAP SERPL CALCULATED.3IONS-SCNC: 5 MMOL/L (ref 3–14)
AST SERPL W P-5'-P-CCNC: 12 U/L (ref 0–45)
BILIRUB DIRECT SERPL-MCNC: 0.2 MG/DL (ref 0–0.2)
BILIRUB SERPL-MCNC: 0.7 MG/DL (ref 0.2–1.3)
BUN SERPL-MCNC: 17 MG/DL (ref 7–30)
CALCIUM SERPL-MCNC: 8.6 MG/DL (ref 8.5–10.1)
CHLORIDE SERPL-SCNC: 107 MMOL/L (ref 94–109)
CO2 SERPL-SCNC: 27 MMOL/L (ref 20–32)
CREAT SERPL-MCNC: 0.87 MG/DL (ref 0.66–1.25)
ERYTHROCYTE [DISTWIDTH] IN BLOOD BY AUTOMATED COUNT: 12.7 % (ref 10–15)
GFR SERPL CREATININE-BSD FRML MDRD: 90 ML/MIN/{1.73_M2}
GLUCOSE SERPL-MCNC: 163 MG/DL (ref 70–99)
HCT VFR BLD AUTO: 33 % (ref 40–53)
HGB BLD-MCNC: 10.8 G/DL (ref 13.3–17.7)
MAGNESIUM SERPL-MCNC: 2.4 MG/DL (ref 1.6–2.3)
MCH RBC QN AUTO: 28.6 PG (ref 26.5–33)
MCHC RBC AUTO-ENTMCNC: 32.7 G/DL (ref 31.5–36.5)
MCV RBC AUTO: 87 FL (ref 78–100)
PHOSPHATE SERPL-MCNC: 3.9 MG/DL (ref 2.5–4.5)
PLATELET # BLD AUTO: 206 10E9/L (ref 150–450)
POTASSIUM SERPL-SCNC: 5.5 MMOL/L (ref 3.4–5.3)
PROT SERPL-MCNC: 6.9 G/DL (ref 6.8–8.8)
RBC # BLD AUTO: 3.78 10E12/L (ref 4.4–5.9)
SODIUM SERPL-SCNC: 139 MMOL/L (ref 133–144)
TACROLIMUS BLD-MCNC: 11 UG/L (ref 5–15)
TME LAST DOSE: NORMAL H
WBC # BLD AUTO: 3 10E9/L (ref 4–11)

## 2019-03-12 PROCEDURE — 84100 ASSAY OF PHOSPHORUS: CPT | Performed by: TRANSPLANT SURGERY

## 2019-03-12 PROCEDURE — 80048 BASIC METABOLIC PNL TOTAL CA: CPT | Performed by: TRANSPLANT SURGERY

## 2019-03-12 PROCEDURE — 80076 HEPATIC FUNCTION PANEL: CPT | Performed by: TRANSPLANT SURGERY

## 2019-03-12 PROCEDURE — 80197 ASSAY OF TACROLIMUS: CPT | Performed by: TRANSPLANT SURGERY

## 2019-03-12 PROCEDURE — 83735 ASSAY OF MAGNESIUM: CPT | Performed by: TRANSPLANT SURGERY

## 2019-03-12 PROCEDURE — 85027 COMPLETE CBC AUTOMATED: CPT | Performed by: TRANSPLANT SURGERY

## 2019-03-12 NOTE — TELEPHONE ENCOUNTER
FUTURE VISIT INFORMATION      FUTURE VISIT INFORMATION:    Date: 3/14/19    Time: 8AM    Location: [facility] [place]  REFERRAL INFORMATION:    Referring provider:  Jaswinder Anne MD    Referring providers clinic:  [facility]    Reason for visit/diagnosis  Change of voice     RECORDS REQUESTED FROM:       Clinic name Comments Records Status Imaging Status   [facility] 2/8/19 notes with Dr Anne [facility]    Specialty Center [address] Endoscopy   4/4/18 EGD with Chaitanya Ramírez MD  Care Everywhere    Fv Procedures 6/18/18 PFT EPIC

## 2019-03-12 NOTE — TELEPHONE ENCOUNTER
He should have labs done weekly, on a Monday or Tuesday.  He should hold onto but not take the mag citrate - maybe next week if magnesium is low and he is still having abdominal pain.

## 2019-03-12 NOTE — TELEPHONE ENCOUNTER
Magnesium 2.4.  Was to take another bottle of mag citrate tomorrow.  Please call Rozina rojas/ the assistance of a  and tell him NOT to take his bottle of Mag citrate tomorrow.

## 2019-03-12 NOTE — TELEPHONE ENCOUNTER
Through  Tien ([id number]), instructed pt to hold mag citrate for tomorrow. Pt would like to know when to restart, when to have blood drawn? Please advise.

## 2019-03-14 ENCOUNTER — OFFICE VISIT (OUTPATIENT)
Dept: OTOLARYNGOLOGY | Facility: CLINIC | Age: 64
End: 2019-03-14
Attending: FAMILY MEDICINE
Payer: MEDICARE

## 2019-03-14 ENCOUNTER — PRE VISIT (OUTPATIENT)
Dept: OTOLARYNGOLOGY | Facility: CLINIC | Age: 64
End: 2019-03-14

## 2019-03-14 ENCOUNTER — TELEPHONE (OUTPATIENT)
Dept: PHARMACY | Facility: CLINIC | Age: 64
End: 2019-03-14

## 2019-03-14 VITALS
DIASTOLIC BLOOD PRESSURE: 60 MMHG | SYSTOLIC BLOOD PRESSURE: 228 MMHG | WEIGHT: 172 LBS | HEIGHT: 67 IN | BODY MASS INDEX: 27 KG/M2 | HEART RATE: 52 BPM

## 2019-03-14 DIAGNOSIS — R13.10 DYSPHAGIA, UNSPECIFIED TYPE: Primary | ICD-10-CM

## 2019-03-14 DIAGNOSIS — R49.0 HOARSENESS: ICD-10-CM

## 2019-03-14 ASSESSMENT — MIFFLIN-ST. JEOR: SCORE: 1528.94

## 2019-03-14 ASSESSMENT — PAIN SCALES - GENERAL: PAINLEVEL: NO PAIN (0)

## 2019-03-14 NOTE — PROGRESS NOTES
The patient presents with a history of chronic hoarseness and choking on food at times.  The patient reports that this condition has been present for at least one year.  The patient is unaware of any precipitating events or respiratory infections prior to the onset of symptoms.  The patient denies gastroesophageal reflux symptoms or heartburn.  The patient denies dysphagia, odynophagia, hemoptysis, hematemasis, or unexplained weight loss.  The patient is not using any oral or nasal inhalent medications.  The patient reports some events of respiratory distress associated with swallowing food.  The patient denies upper respiratory infections or post-nasal drainage. The patient denies sinusitis, rhinitis, facial pain, nasal obstruction or purulent nasal discharge. The patient denies chronic or recurrent tonsillitis, chronic or recurrent pharyngitis. The patient denies otalgia, otorrhea, eustachian tube dysfunction, ear infections, dizziness or tinnitus.     This patient is seen in consultation at the request of Dr. Jaswinder Anne.    All other systems were reviewed and they are either negative or they are not directly pertinent to this Otolaryngology examination.      Past Medical History:    Past Medical History:   Diagnosis Date     Biliary stricture of transplanted liver (H) 2018     Cancer (H)     hepatocellualr carcinoma     Cirrhosis of liver (H) 2018     Enlarged prostate      Inguinal hernia     Repaired with mesh on 18     Liver lesion 2018     Liver transplanted (H) 2018     donor liver transplant     Portal vein thrombosis     on path explant     Postoperative atrial fibrillation (H) 2018     Pre-diabetes 2018       Past Surgical History:    Past Surgical History:   Procedure Laterality Date     APPENDECTOMY       COLONOSCOPY      2018     ENDOSCOPIC RETROGRADE CHOLANGIOPANCREATOGRAM N/A 2018    Procedure: ENDOSCOPIC RETROGRADE CHOLANGIOPANCREATOGRAM, with  Billary Sphincterotomy and Billary Stent Placement;  Surgeon: Omero Lawler MD;  Location: [facility] OR     ENDOSCOPIC RETROGRADE CHOLANGIOPANCREATOGRAM  2019    Procedure: COMBINED ENDOSCOPIC RETROGRADE CHOLANGIOPANCREATOGRAPHY, Bile Duct Stent Exchange;  Surgeon: Omero Lawler MD;  Location: [facility] OR     HERNIORRHAPHY INGUINAL  2018    Procedure: Herniorrhaphy inguinal;  Surgeon: Emil Echevarria MD;  Location: [facility] OR     IR LIVER BIOPSY PERCUTANEOUS  2018     TRANSPLANT LIVER RECIPIENT  DONOR N/A 2018    Procedure: TRANSPLANT LIVER RECIPIENT  DONOR;  Surgeon: Emil Echevarria MD;  Location: [facility] OR       Medications:      Current Outpatient Medications:      alfuzosin ER (UROXATRAL) 10 MG 24 hr tablet, Take 1 tablet (10 mg) by mouth daily, Disp: 90 tablet, Rfl: 3     amLODIPine (NORVASC) 10 MG tablet, Take 1 tablet (10 mg) by mouth daily, Disp: 30 tablet, Rfl: 11     aspirin (ASA) 325 MG EC tablet, Take 1 tablet (325 mg) by mouth daily, Disp: 30 tablet, Rfl: 5     blood glucose (NO BRAND SPECIFIED) lancets standard, Use to test blood sugar 4 times daily or as directed., Disp: 200 each, Rfl: 11     blood glucose (ONETOUCH VERIO IQ) test strip, Use to test blood sugar 4 times daily or as directed., Disp: 200 strip, Rfl: 11     glucose 40 % (400 mg/mL) GEL gel, Take 15-30 g by mouth every 15 minutes as needed for low blood sugar, Disp: 30 g, Rfl: 3     insulin pen needle (32G X 4 MM) 32G X 4 MM miscellaneous, Use once pen needles daily or as directed., Disp: 100 each, Rfl: 3     isoniazid (NYDRAZID) 300 MG tablet, Take 1 tablet (300 mg) by mouth daily, Disp: 60 tablet, Rfl: 0     magnesium citrate solution, Take 296 mLs by mouth daily for 3 days Take 1 bottle on Monday, Tuesday and Wednesday, Disp: 296 mL, Rfl: 0     magnesium citrate solution, Take 296 mLs by mouth daily, Disp: 296 mL, Rfl: 0     metoprolol tartrate 75 MG TABS, Take 75 mg by mouth 2 times daily,  Disp: 60 tablet, Rfl: 11     multivitamin w/minerals (THERA-VIT-M) tablet, Take 1 tablet by mouth daily, Disp: 90 tablet, Rfl: 3     mycophenolate (GENERIC EQUIVALENT) 250 MG capsule, Take 250 mg morning and night 3/11 to 3/17, then take 1 tablet (250 mg) daily thru 3/24, then stop., Disp: 120 capsule, Rfl: 11     polyethylene glycol (MIRALAX/GLYCOLAX) packet, Take 17 g by mouth daily as needed for constipation, Disp: 7 packet, Rfl: 1     polyvinyl alcohol (LIQUIFILM TEARS) 1.4 % ophthalmic solution, Place 1 drop into both eyes as needed for dry eyes, Disp: 15 mL, Rfl: 3     Sharps Container MISC, 1 each daily, Disp: 1 each, Rfl: 2     sulfamethoxazole-trimethoprim (BACTRIM/SEPTRA) 400-80 MG tablet, Take 1 tablet by mouth daily, Disp: 30 tablet, Rfl: 5     tacrolimus (GENERIC EQUIVALENT) 1 MG capsule, Take 6 capsules (6 mg) by mouth 2 times daily, Disp: 360 capsule, Rfl: 3     urea (GORMEL) 20 % external cream, Apply as a moisturizer to your whole body everyday., Disp: 480 g, Rfl: 5     ursodiol (ACTIGALL) 250 MG tablet, Take 1 tablet (250 mg) by mouth 2 times daily, Disp: 60 tablet, Rfl: 11     valGANciclovir (VALCYTE) 450 MG tablet, Take 2 tablets (900 mg) by mouth daily, Disp: 69 tablet, Rfl: 0     vitamin B6 (PYRIDOXINE) 50 MG TABS, Take 1 tablet (50 mg) by mouth daily, Disp: 60 tablet, Rfl: 0    Current Facility-Administered Medications:      aluminum chloride (DRYSOL) 20 % external solution, , Topical, Once, Rolly Smith MD    Allergies:    Patient has no known allergies.    Physical Examination:    The patient is a well developed, well nourished male in no apparent distress.  He is normocephalic, atraumatic with pupils equally round and reactive to light.    Oral Cavity Examination:  Normal mucosa with no masses or lesions  Nasal Examination: Enlarged nasal turbinates with mildly deviated septum. There is a mass along the anterior aspect of the left middle turbinate  Ear Examination: Ear canals clear,  tympanic membranes and middle ear spaces normal  Neurological Examination: Facial nerve function intact and symmetric  Integumentary Examination: No lesions on the skin of the head and neck  Neck Examination: No masses or lesions, no lymphadenopathy  Endocrine Examination: Normal thyroid examination  Flexible Fiberoptic Laryngoscopy: Normal nasopharynx, base of tongue, valleculae, pyriform sinuses, false vocal cords and true vocal cords.  The vocal cords are moving normally and there are no lesions or masses in the larynx.     Assessment and Plan:    The patient presents with a history of hoarseness and events of choking on food. He has a mass in the anterior aspect of the left nostril. The patient will be referred for a video swallow study and a consultation with Dr. Abel Merlos or Dr. Josselin Yang for his hoarseness. He will be referred to Dr. Jasvir Robles for evaluation of the nasal mass.       PreOp Diagnosis:  Dysphagia    PostOp Diagnosis: Dysphagia    Procedure: Flexible Fiberoptic Laryngoscopy    Estimated Blood Loss: None    Complications: None    Consent: The patient was informed of the possible complications and probable outcomes of the procedure and the patient gave informed consent.    Fluids: None    Drains: None    Disposition: to home    Findings: Normal nasopharynx, base of tongue, pyriform sinuses, epiglottis, valleculae, false vocal cords, true vocal cords, and larynx.  Normal motion of the vocal cords with no lesions, masses, nodules, or polyps bilaterally.     Description of Procedure:    The patient was positioned on the clinic room chair. The nose was decongested and anesthetized with 2 puffs in each nostrils lidocaine hcl 4% and oxymetazoline hcl 0.05% topical solution. The flexible fiberoptic scope was passed into the each nostrils and the nasal passages visualized. The scope was passed through the left nostril into the nasopharynx which demonstrated a mass along the anterior aspect of  the left middle turbinate. The based of tongue and tonsil tissues were visualized and found to be normal. The vocal cords and larynx were visualized and the true vocal cords were visualized and found to be normal.

## 2019-03-14 NOTE — LETTER
3/14/2019       RE: Loy Limon  [address]  [facility] [address]     Dear Colleague,    Thank you for referring your patient, Loy Limon, to the [facility] at [facility]. Please see a copy of my visit note below.    The patient presents with a history of chronic hoarseness and choking on food at times.  The patient reports that this condition has been present for at least one year.  The patient is unaware of any precipitating events or respiratory infections prior to the onset of symptoms.  The patient denies gastroesophageal reflux symptoms or heartburn.  The patient denies dysphagia, odynophagia, hemoptysis, hematemasis, or unexplained weight loss.  The patient is not using any oral or nasal inhalent medications.  The patient reports some events of respiratory distress associated with swallowing food.  The patient denies upper respiratory infections or post-nasal drainage. The patient denies sinusitis, rhinitis, facial pain, nasal obstruction or purulent nasal discharge. The patient denies chronic or recurrent tonsillitis, chronic or recurrent pharyngitis. The patient denies otalgia, otorrhea, eustachian tube dysfunction, ear infections, dizziness or tinnitus.     This patient is seen in consultation at the request of Dr. Jaswinder Anne.    All other systems were reviewed and they are either negative or they are not directly pertinent to this Otolaryngology examination.      Past Medical History:    Past Medical History:   Diagnosis Date     Biliary stricture of transplanted liver (H) 2018     Cancer (H)     hepatocellualr carcinoma     Cirrhosis of liver (H) 2018     Enlarged prostate      Inguinal hernia     Repaired with mesh on 18     Liver lesion 2018     Liver transplanted (H) 2018     donor liver transplant     Portal vein thrombosis     on path explant     Postoperative atrial fibrillation  (H) 2018     Pre-diabetes 2018       Past Surgical History:    Past Surgical History:   Procedure Laterality Date     APPENDECTOMY       COLONOSCOPY      2018     ENDOSCOPIC RETROGRADE CHOLANGIOPANCREATOGRAM N/A 2018    Procedure: ENDOSCOPIC RETROGRADE CHOLANGIOPANCREATOGRAM, with Billary Sphincterotomy and Billary Stent Placement;  Surgeon: Omero Lawler MD;  Location: [facility] OR     ENDOSCOPIC RETROGRADE CHOLANGIOPANCREATOGRAM  2019    Procedure: COMBINED ENDOSCOPIC RETROGRADE CHOLANGIOPANCREATOGRAPHY, Bile Duct Stent Exchange;  Surgeon: Omero Lawler MD;  Location: [facility] OR     HERNIORRHAPHY INGUINAL  2018    Procedure: Herniorrhaphy inguinal;  Surgeon: Emil Echevarria MD;  Location: [facility] OR     IR LIVER BIOPSY PERCUTANEOUS  2018     TRANSPLANT LIVER RECIPIENT  DONOR N/A 2018    Procedure: TRANSPLANT LIVER RECIPIENT  DONOR;  Surgeon: Emil Echevarria MD;  Location: [facility] OR       Medications:      Current Outpatient Medications:      alfuzosin ER (UROXATRAL) 10 MG 24 hr tablet, Take 1 tablet (10 mg) by mouth daily, Disp: 90 tablet, Rfl: 3     amLODIPine (NORVASC) 10 MG tablet, Take 1 tablet (10 mg) by mouth daily, Disp: 30 tablet, Rfl: 11     aspirin (ASA) 325 MG EC tablet, Take 1 tablet (325 mg) by mouth daily, Disp: 30 tablet, Rfl: 5     blood glucose (NO BRAND SPECIFIED) lancets standard, Use to test blood sugar 4 times daily or as directed., Disp: 200 each, Rfl: 11     blood glucose (ONETOUCH VERIO IQ) test strip, Use to test blood sugar 4 times daily or as directed., Disp: 200 strip, Rfl: 11     glucose 40 % (400 mg/mL) GEL gel, Take 15-30 g by mouth every 15 minutes as needed for low blood sugar, Disp: 30 g, Rfl: 3     insulin pen needle (32G X 4 MM) 32G X 4 MM miscellaneous, Use once pen needles daily or as directed., Disp: 100 each, Rfl: 3     isoniazid (NYDRAZID) 300 MG tablet, Take 1 tablet (300 mg) by mouth daily, Disp: 60 tablet,  Rfl: 0     magnesium citrate solution, Take 296 mLs by mouth daily for 3 days Take 1 bottle on Monday, Tuesday and Wednesday, Disp: 296 mL, Rfl: 0     magnesium citrate solution, Take 296 mLs by mouth daily, Disp: 296 mL, Rfl: 0     metoprolol tartrate 75 MG TABS, Take 75 mg by mouth 2 times daily, Disp: 60 tablet, Rfl: 11     multivitamin w/minerals (THERA-VIT-M) tablet, Take 1 tablet by mouth daily, Disp: 90 tablet, Rfl: 3     mycophenolate (GENERIC EQUIVALENT) 250 MG capsule, Take 250 mg morning and night 3/11 to 3/17, then take 1 tablet (250 mg) daily thru 3/24, then stop., Disp: 120 capsule, Rfl: 11     polyethylene glycol (MIRALAX/GLYCOLAX) packet, Take 17 g by mouth daily as needed for constipation, Disp: 7 packet, Rfl: 1     polyvinyl alcohol (LIQUIFILM TEARS) 1.4 % ophthalmic solution, Place 1 drop into both eyes as needed for dry eyes, Disp: 15 mL, Rfl: 3     Sharps Container MISC, 1 each daily, Disp: 1 each, Rfl: 2     sulfamethoxazole-trimethoprim (BACTRIM/SEPTRA) 400-80 MG tablet, Take 1 tablet by mouth daily, Disp: 30 tablet, Rfl: 5     tacrolimus (GENERIC EQUIVALENT) 1 MG capsule, Take 6 capsules (6 mg) by mouth 2 times daily, Disp: 360 capsule, Rfl: 3     urea (GORMEL) 20 % external cream, Apply as a moisturizer to your whole body everyday., Disp: 480 g, Rfl: 5     ursodiol (ACTIGALL) 250 MG tablet, Take 1 tablet (250 mg) by mouth 2 times daily, Disp: 60 tablet, Rfl: 11     valGANciclovir (VALCYTE) 450 MG tablet, Take 2 tablets (900 mg) by mouth daily, Disp: 69 tablet, Rfl: 0     vitamin B6 (PYRIDOXINE) 50 MG TABS, Take 1 tablet (50 mg) by mouth daily, Disp: 60 tablet, Rfl: 0    Current Facility-Administered Medications:      aluminum chloride (DRYSOL) 20 % external solution, , Topical, Once, Rolly Smith MD    Allergies:    Patient has no known allergies.    Physical Examination:    The patient is a well developed, well nourished male in no apparent distress.  He is normocephalic, atraumatic  with pupils equally round and reactive to light.    Oral Cavity Examination:  Normal mucosa with no masses or lesions  Nasal Examination: Enlarged nasal turbinates with mildly deviated septum. There is a mass along the anterior aspect of the left middle turbinate  Ear Examination: Ear canals clear, tympanic membranes and middle ear spaces normal  Neurological Examination: Facial nerve function intact and symmetric  Integumentary Examination: No lesions on the skin of the head and neck  Neck Examination: No masses or lesions, no lymphadenopathy  Endocrine Examination: Normal thyroid examination  Flexible Fiberoptic Laryngoscopy: Normal nasopharynx, base of tongue, valleculae, pyriform sinuses, false vocal cords and true vocal cords.  The vocal cords are moving normally and there are no lesions or masses in the larynx.     Assessment and Plan:    The patient presents with a history of hoarseness and events of choking on food. He has a mass in the anterior aspect of the left nostril. The patient will be referred for a video swallow study and a consultation with Dr. Abel Merlos or Dr. Josselin Yang for his hoarseness. He will be referred to Dr. Jasvir Robles for evaluation of the nasal mass.       PreOp Diagnosis:  Dysphagia    PostOp Diagnosis: Dysphagia    Procedure: Flexible Fiberoptic Laryngoscopy    Estimated Blood Loss: None    Complications: None    Consent: The patient was informed of the possible complications and probable outcomes of the procedure and the patient gave informed consent.    Fluids: None    Drains: None    Disposition: to home    Findings: Normal nasopharynx, base of tongue, pyriform sinuses, epiglottis, valleculae, false vocal cords, true vocal cords, and larynx.  Normal motion of the vocal cords with no lesions, masses, nodules, or polyps bilaterally.     Description of Procedure:    The patient was positioned on the clinic room chair. The nose was decongested and anesthetized with 2 puffs  in each nostrils lidocaine hcl 4% and oxymetazoline hcl 0.05% topical solution. The flexible fiberoptic scope was passed into the each nostrils and the nasal passages visualized. The scope was passed through the left nostril into the nasopharynx which demonstrated a mass along the anterior aspect of the left middle turbinate. The based of tongue and tonsil tissues were visualized and found to be normal. The vocal cords and larynx were visualized and the true vocal cords were visualized and found to be normal.         Again, thank you for allowing me to participate in the care of your patient.      Sincerely,    Kevin Hull MD

## 2019-03-14 NOTE — PATIENT INSTRUCTIONS
1.  You were seen in the ENT Clinic today by Dr. Hull.  If you have any questions or concerns after your appointment, please call [phone]. Press option #1 for scheduling related needs. Press option #3 for Nurse advice.    2.  Please schedule an appointment for the following:   - Dr. Robles - Nasal Evaluation   - Speech Therapy - Video Swallow Study    3.  Plan is to return to clinic to see Dr. Yang or Dr. Abel for further evaluation of chronic hoarseness.      Kathrin Aragon LPN  [facility] Otolaryngology  [phone]    The patient presents with a history of hoarseness and events of choking on food. He has a mass in the anterior aspect of the left nostril. The patient will be referred for a video swallow study and a consultation with Dr. Abel Merlos or Dr. Josselin Yang for his hoarseness. He will be referred to Dr. Jasvir Robles for evaluation of the nasal mass.

## 2019-03-14 NOTE — NURSING NOTE
"Chief Complaint   Patient presents with     Consult     chronic mucous in throat      Blood pressure (!) 228/60, pulse 52, height 1.71 m (5' 7.32\"), weight 78 kg (172 lb).    Emmanuel Olmos LPN    "

## 2019-03-15 ENCOUNTER — OFFICE VISIT (OUTPATIENT)
Dept: INFECTIOUS DISEASES | Facility: CLINIC | Age: 64
End: 2019-03-15
Attending: STUDENT IN AN ORGANIZED HEALTH CARE EDUCATION/TRAINING PROGRAM
Payer: MEDICARE

## 2019-03-15 VITALS
WEIGHT: 169.5 LBS | DIASTOLIC BLOOD PRESSURE: 66 MMHG | SYSTOLIC BLOOD PRESSURE: 118 MMHG | OXYGEN SATURATION: 97 % | TEMPERATURE: 97.8 F | HEART RATE: 50 BPM | BODY MASS INDEX: 26.29 KG/M2

## 2019-03-15 DIAGNOSIS — Z22.7 LATENT TUBERCULOSIS INFECTION: Primary | ICD-10-CM

## 2019-03-15 DIAGNOSIS — Z94.4 LIVER TRANSPLANT RECIPIENT (H): Chronic | ICD-10-CM

## 2019-03-15 PROCEDURE — T1013 SIGN LANG/ORAL INTERPRETER: HCPCS | Mod: U3,ZF

## 2019-03-15 PROCEDURE — G0463 HOSPITAL OUTPT CLINIC VISIT: HCPCS | Mod: ZF

## 2019-03-15 NOTE — LETTER
RE: Loy Limon  [address]  [facility] [address]     Dear Colleague,    Thank you for referring your patient, Loy Limon, to the [facility] INFECTIOUS DISEASES at [facility]. Please see a copy of my visit note below.    [facility]  Transplant Infectious Disease Consultation note  Today's Date: 03/15/2019    Recommendations:  - continue INH with B6 till aroudn 4/9 to complete latent TB treatment   - LFT monitoring by the transplant team   - valcyte and bactrim prophylaxis duration per protocol    Thank you for involving me in the care of this patient. Please do not hesitate to contact me with any questions.    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 S/p liver transplant 12/22.     Latent TB: with a positive quant and being from [place] where TB incidence is high and in the absence of symptoms or signs of active TB, would consider him to have latent TB. Recommend rifampin for 4 months. No major drug interactions but due to liver cirrhosis, will follow LFTS closely. Discussed symptoms of liver toxicity and side effects of rifampin and answered all questions about the concept of latent Tb that patient had a little difficulty in understanding.    We started treatment 8/21. He is supposed to have been done 12/20 but had 1.5 months worth of meds remaining on 12/4. Rifampin was stopped 12/22 once he got the transplant. He brought in the remaining rifampin meds on 2/8 which amounted to 27 days worth of pills. I then started him on  mg daily with pyridoxine for 2 months total to complete latent TB treatment. He has 2-3 weeks remaining of the latent TB treatment with god tolerance of the medication.     - Serostatus: CMV R+, EBV R+  - Immunization status: up to date   - Prophylaxis: valcyte 900  mg daily, bactrim     Attestation: I have reviewed today's vital signs, medications, labs and imaging. Face to face time 25 mins. >50% spent coordinating care and counseling on my impressions and plan going forward.     Pauline Clancy  , [facility]  Pager - [phone]    -------------------------------------------------------------------------------------------------------------------   Interval events: Last seen 2/8   services utilized  Since then it appears that he has been taking  gm daily with pyridoxine 50 mg daily. He is due to complete the 2 month course around 4/9.  He has also increased the dose of valcyte to 900 mg daily as recommended by me  He is tolerating it well. LFTs are at baseline. He feels fine except for numbness in RUQ. He feels he is 90% of normal. He is also able to do his pill box himeself with the nurse overlooking him.     Reason for consult / Chief complaint: 8/21/18  Consulted by Dr. Carballo for latent TB    History of presenting illness: Patient presents with   Loy Braxton is a 63 year old with medical history significant for cirrhosis of liver from alcohol use, HCC being evaluated for liver transplantation. As part of that work up, quantiferon was done and found to be positive at a value of around 1 and was thus referred to me.     He was born and raised in [place]. Came to the [place] around 35 years ago, goes back to [place] every year. He does not remember being exposed to anybody with TB. He does not recall having PPD or quant before. He reports, his wifes brother had tuberculosis, but he not closely associated with him. He denies symptoms of active TB. Ct chest done 5/2018 does not show evidence of active pulmn TB.     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     Social Hx:  Social History     Tobacco Use     Smoking status: Former Smoker      Packs/day: 0.03     Years: 20.00     Pack years: 0.60     Types: Cigarettes, Cigars     Start date: 1970     Last attempt to quit: 3/14/2018     Years since quittin.0     Smokeless tobacco: Never Used     Tobacco comment: Quit smoking 2018, one cigarette after dinner   Substance Use Topics     Alcohol use: No     Comment: Quit drinking 2018     Drug use: No       Immunizations:  Immunization History   Administered Date(s) Administered     Influenza (High Dose) 3 valent vaccine 10/24/2018     Pneumo Conj 13-V (2010&after) 2018     Pneumococcal 23 valent 2018     TDAP Vaccine (Boostrix) 2018     Zoster vaccine recombinant adjuvanted (SHINGRIX) 2018, 10/24/2018       Allergies:   No Known Allergies    Medications:  Current Outpatient Medications   Medication     alfuzosin ER (UROXATRAL) 10 MG 24 hr tablet     amLODIPine (NORVASC) 10 MG tablet     aspirin (ASA) 325 MG EC tablet     blood glucose (NO BRAND SPECIFIED) lancets standard     blood glucose (ONETOUCH VERIO IQ) test strip     glucose 40 % (400 mg/mL) GEL gel     insulin pen needle (32G X 4 MM) 32G X 4 MM miscellaneous     isoniazid (NYDRAZID) 300 MG tablet     magnesium citrate solution     metoprolol tartrate 75 MG TABS     multivitamin w/minerals (THERA-VIT-M) tablet     mycophenolate (GENERIC EQUIVALENT) 250 MG capsule     polyethylene glycol (MIRALAX/GLYCOLAX) packet     polyvinyl alcohol (LIQUIFILM TEARS) 1.4 % ophthalmic solution     Sharps Container MISC     sulfamethoxazole-trimethoprim (BACTRIM/SEPTRA) 400-80 MG tablet     tacrolimus (GENERIC EQUIVALENT) 1 MG capsule     urea (GORMEL) 20 % external cream     ursodiol (ACTIGALL) 250 MG tablet     valGANciclovir (VALCYTE) 450 MG tablet     vitamin B6 (PYRIDOXINE) 50 MG TABS     Current Facility-Administered Medications   Medication     aluminum chloride (DRYSOL) 20 % external solution       Past Medical Hx:  Past Medical History:   Diagnosis Date     Biliary  stricture of transplanted liver (H) 2018     Cancer (H)     hepatocellualr carcinoma     Cirrhosis of liver (H) 2018     Enlarged prostate      Inguinal hernia     Repaired with mesh on 18     Liver lesion 2018     Liver transplanted (H) 2018     donor liver transplant     Portal vein thrombosis     on path explant     Postoperative atrial fibrillation (H) 2018     Pre-diabetes 2018         Family History:  Family History   Problem Relation Age of Onset     Liver Disease Brother      Skin Cancer No family hx of      Melanoma No family hx of          Review of Systems:  10 systems reviewed, pertinent positives noted in my HPI.      Examination:  Vital signs:   /66   Pulse 50   Temp 97.8  F (36.6  C)   Wt 76.9 kg (169 lb 8 oz)   SpO2 97%   BMI 26.29 kg/m       Constitutional: Patient in no distress  Eyes: not pale, or jaundiced  Neck: no lymphadenopathy  CVS: no added sounds  RS: clear  Abdomen: soft, BS+, gertrude Papa incision present and healed  Skin: no rash  Extremities: no pedal edema  Psych: Alert and oriented x 3    Laboratory:  Hematology:  Recent Labs   Lab Test 19  0754   WBC 3.0* 3.6* 3.7*   RBC 3.78* 3.89* 3.49*   HGB 10.8* 11.2* 10.1*   HCT 33.0* 34.4* 30.9*   MCV 87 88 89   MCH 28.6 28.8 28.9   MCHC 32.7 32.6 32.7   RDW 12.7 12.9 13.0    199 170       Chemistry:  Recent Labs   Lab Test 19  0754    133 135   POTASSIUM 5.5* 5.1 4.8   CHLORIDE 107 102 103   CO2 27 27 25   ANIONGAP 5 4 7   * 284* 180*   BUN 17 20 20   CR 0.87 0.78 0.82   YELENA 8.6 8.9 8.3*       Liver Function Studies:     Recent Labs   Lab Test 19  0754   PROTTOTAL 6.9 7.5 6.9   ALBUMIN 3.7 4.1 3.6   BILITOTAL 0.7 0.7 0.6   ALKPHOS 189* 211* 201*   AST 12 10 6   ALT 14 17 17       Microbiology:  18   Quant positive     Strongy, toxoplasma and cysticercus  Ab negative     [id number]   CT chest   Multifocal area of groundglass opacities of the bilateral  lower lobes, to lesser extent left upper lobes. Differentials  including focal atelectasis versus infection. Short-term follow-up to  make sure resolution is recommended.   1.  4 mm part solid pulmonary nodule within the right lower lobe  posterior medially, attention on follow-up is recommended.  3. Right hepatic lobe hypoattenuating lesion measures 3.8 x 3.0 cm.  Hypoattenuating lesion in the posterior right hepatic lobe measures  1.9 cm. Limited evaluation due to single phase study. Please refer to  same-day MRI study for further characterization.  4. Gallbladder wall thickening. Consider ultrasound.    Again, thank you for allowing me to participate in the care of your patient.      Sincerely,    Pauline Clancy MD

## 2019-03-15 NOTE — NURSING NOTE
Chief Complaint   Patient presents with     RECHECK     lab results     Blood pressure 118/66, pulse 50, temperature 97.8  F (36.6  C), weight 76.9 kg (169 lb 8 oz), SpO2 97 %.    Angela Vo CMA

## 2019-03-15 NOTE — TELEPHONE ENCOUNTER
Spoke to pt today. Instructed pt to stop taking mag citrate right now until otherwise informed. Pt is not having any abdominal pain at this point. Pt is scheduled to have weekly labs starting next week. Is that ok with you?

## 2019-03-19 ENCOUNTER — OFFICE VISIT (OUTPATIENT)
Dept: GASTROENTEROLOGY | Facility: CLINIC | Age: 64
End: 2019-03-19
Attending: INTERNAL MEDICINE
Payer: MEDICARE

## 2019-03-19 ENCOUNTER — TELEPHONE (OUTPATIENT)
Dept: TRANSPLANT | Facility: CLINIC | Age: 64
End: 2019-03-19

## 2019-03-19 VITALS
HEART RATE: 51 BPM | DIASTOLIC BLOOD PRESSURE: 68 MMHG | HEIGHT: 67 IN | TEMPERATURE: 97.9 F | WEIGHT: 168.6 LBS | BODY MASS INDEX: 26.46 KG/M2 | OXYGEN SATURATION: 98 % | SYSTOLIC BLOOD PRESSURE: 153 MMHG

## 2019-03-19 DIAGNOSIS — Z94.4 LIVER REPLACED BY TRANSPLANT (H): ICD-10-CM

## 2019-03-19 DIAGNOSIS — C22.0 HCC (HEPATOCELLULAR CARCINOMA) (H): Primary | ICD-10-CM

## 2019-03-19 DIAGNOSIS — R97.20 ELEVATED PROSTATE SPECIFIC ANTIGEN (PSA): ICD-10-CM

## 2019-03-19 DIAGNOSIS — Z94.4 LIVER TRANSPLANTED (H): ICD-10-CM

## 2019-03-19 LAB
ALBUMIN SERPL-MCNC: 3.7 G/DL (ref 3.4–5)
ALP SERPL-CCNC: 170 U/L (ref 40–150)
ALT SERPL W P-5'-P-CCNC: 16 U/L (ref 0–70)
ANION GAP SERPL CALCULATED.3IONS-SCNC: 3 MMOL/L (ref 3–14)
AST SERPL W P-5'-P-CCNC: 9 U/L (ref 0–45)
BILIRUB DIRECT SERPL-MCNC: 0.2 MG/DL (ref 0–0.2)
BILIRUB SERPL-MCNC: 0.5 MG/DL (ref 0.2–1.3)
BUN SERPL-MCNC: 19 MG/DL (ref 7–30)
CALCIUM SERPL-MCNC: 8.8 MG/DL (ref 8.5–10.1)
CHLORIDE SERPL-SCNC: 109 MMOL/L (ref 94–109)
CO2 SERPL-SCNC: 26 MMOL/L (ref 20–32)
CREAT SERPL-MCNC: 0.79 MG/DL (ref 0.66–1.25)
ERYTHROCYTE [DISTWIDTH] IN BLOOD BY AUTOMATED COUNT: 12.6 % (ref 10–15)
GFR SERPL CREATININE-BSD FRML MDRD: >90 ML/MIN/{1.73_M2}
GLUCOSE SERPL-MCNC: 158 MG/DL (ref 70–99)
HCT VFR BLD AUTO: 32.4 % (ref 40–53)
HGB BLD-MCNC: 10.4 G/DL (ref 13.3–17.7)
MAGNESIUM SERPL-MCNC: 2.1 MG/DL (ref 1.6–2.3)
MCH RBC QN AUTO: 28.3 PG (ref 26.5–33)
MCHC RBC AUTO-ENTMCNC: 32.1 G/DL (ref 31.5–36.5)
MCV RBC AUTO: 88 FL (ref 78–100)
PHOSPHATE SERPL-MCNC: 4.2 MG/DL (ref 2.5–4.5)
PLATELET # BLD AUTO: 172 10E9/L (ref 150–450)
POTASSIUM SERPL-SCNC: 5.2 MMOL/L (ref 3.4–5.3)
PROT SERPL-MCNC: 6.8 G/DL (ref 6.8–8.8)
PSA SERPL-MCNC: 5.02 UG/L (ref 0–4)
RBC # BLD AUTO: 3.68 10E12/L (ref 4.4–5.9)
SODIUM SERPL-SCNC: 138 MMOL/L (ref 133–144)
TACROLIMUS BLD-MCNC: 9.7 UG/L (ref 5–15)
TME LAST DOSE: 1800 H
WBC # BLD AUTO: 2.8 10E9/L (ref 4–11)

## 2019-03-19 PROCEDURE — G0463 HOSPITAL OUTPT CLINIC VISIT: HCPCS | Mod: ZF

## 2019-03-19 PROCEDURE — 83735 ASSAY OF MAGNESIUM: CPT | Performed by: UROLOGY

## 2019-03-19 PROCEDURE — 36415 COLL VENOUS BLD VENIPUNCTURE: CPT | Performed by: UROLOGY

## 2019-03-19 PROCEDURE — 80076 HEPATIC FUNCTION PANEL: CPT | Performed by: UROLOGY

## 2019-03-19 PROCEDURE — 80197 ASSAY OF TACROLIMUS: CPT | Performed by: TRANSPLANT SURGERY

## 2019-03-19 PROCEDURE — 80048 BASIC METABOLIC PNL TOTAL CA: CPT | Performed by: UROLOGY

## 2019-03-19 PROCEDURE — 80307 DRUG TEST PRSMV CHEM ANLYZR: CPT | Performed by: TRANSPLANT SURGERY

## 2019-03-19 PROCEDURE — 84100 ASSAY OF PHOSPHORUS: CPT | Performed by: UROLOGY

## 2019-03-19 PROCEDURE — T1013 SIGN LANG/ORAL INTERPRETER: HCPCS | Mod: U3

## 2019-03-19 PROCEDURE — 84153 ASSAY OF PSA TOTAL: CPT | Performed by: UROLOGY

## 2019-03-19 PROCEDURE — 85027 COMPLETE CBC AUTOMATED: CPT | Performed by: UROLOGY

## 2019-03-19 ASSESSMENT — PAIN SCALES - GENERAL: PAINLEVEL: MODERATE PAIN (5)

## 2019-03-19 ASSESSMENT — MIFFLIN-ST. JEOR: SCORE: 1508.39

## 2019-03-19 NOTE — PROGRESS NOTES
[facility]    Hepatology follow-up    CHIEF COMPLAINT:  Reason for the visit is status post liver transplantation for hepatocellular carcinoma.      SUBJECTIVE:  Mr. Limon is a 63-year-old [place] immigrant whom I have seen here for alcoholic liver disease complicated by portal hypertension.  He had the diagnosis made in 03/2018. At that time of his presentation he had  abdominal pain and on imaging he was found to have cirrhosis.  He also had lesions concerning for hepatocellular carcinoma.  He came here and we evaluated him for liver transplantation.  He was seen by IR on 06/13/2018 and a CT-guided microwave ablation was done and after that there was no viable tumor.  He got listed and got the transplant on 12/22/2018.  His explant had a viable tumor instead.  He also had problems with strictures and had Dr. Omero Lawler do ERCPs and had stents placed.  Now his liver function tests are normal and his creatinine is also normal.  He is very compliant with his medications and his main complaint is some right upper quadrant abdominal discomfort in the scar area.  Otherwise, he is moving his bowels 3 times a day with a stool softener.  He used MiraLax at one point and magnesium citrate, which was discontinued because of increased magnesium levels.  His weight has gone up lately.  Appetite is good.  No edema and he is supposed to follow also with our ID group for his latent TB.     Medical hx Surgical hx   Past Medical History:   Diagnosis Date     Biliary stricture of transplanted liver (H) 12/28/2018     Cancer (H)      Cirrhosis of liver (H) 5/8/2018     Enlarged prostate      Inguinal hernia      Liver lesion 5/8/2018     Liver transplanted (H) 12/22/2018     Portal vein thrombosis      Postoperative atrial fibrillation (H) 12/25/2018     Pre-diabetes 12/22/2018      Past Surgical History:   Procedure Laterality Date     APPENDECTOMY       COLONOSCOPY      2018     ENDOSCOPIC RETROGRADE  CHOLANGIOPANCREATOGRAM N/A 2018    Procedure: ENDOSCOPIC RETROGRADE CHOLANGIOPANCREATOGRAM, with Billary Sphincterotomy and Billary Stent Placement;  Surgeon: Omero Lawler MD;  Location: [facility] OR     ENDOSCOPIC RETROGRADE CHOLANGIOPANCREATOGRAM  2019    Procedure: COMBINED ENDOSCOPIC RETROGRADE CHOLANGIOPANCREATOGRAPHY, Bile Duct Stent Exchange;  Surgeon: Omero Lawler MD;  Location: [facility] OR     HERNIORRHAPHY INGUINAL  2018    Procedure: Herniorrhaphy inguinal;  Surgeon: Emil Echevarria MD;  Location: [facility] OR     IR LIVER BIOPSY PERCUTANEOUS  2018     TRANSPLANT LIVER RECIPIENT  DONOR N/A 2018    Procedure: TRANSPLANT LIVER RECIPIENT  DONOR;  Surgeon: Emil Echevarria MD;  Location: [facility] OR          Medications  Prior to Admission medications    Medication Sig Start Date End Date Taking? Authorizing Provider   alfuzosin ER (UROXATRAL) 10 MG 24 hr tablet Take 1 tablet (10 mg) by mouth daily 19  Yes Jaswinder Anne MD   amLODIPine (NORVASC) 10 MG tablet Take 1 tablet (10 mg) by mouth daily 19  Yes Inez Baker APRN CNP   aspirin (ASA) 325 MG EC tablet Take 1 tablet (325 mg) by mouth daily 19  Yes Inez Baker APRN CNP   blood glucose (NO BRAND SPECIFIED) lancets standard Use to test blood sugar 4 times daily or as directed. 19 Yes Inez Baker APRN CNP   blood glucose (ONETOUCH VERIO IQ) test strip Use to test blood sugar 4 times daily or as directed. 19 Yes Inez Baker APRN CNP   glucose 40 % (400 mg/mL) GEL gel Take 15-30 g by mouth every 15 minutes as needed for low blood sugar 19  Yes Inez Baker APRN CNP   insulin pen needle (32G X 4 MM) 32G X 4 MM miscellaneous Use once pen needles daily or as directed. 19 Yes Inez Baker APRN CNP   isoniazid (NYDRAZID) 300 MG tablet Take 1 tablet (300 mg) by mouth daily 19 Yes Julieth  MD Pauline   magnesium citrate solution Take 296 mLs by mouth daily 3/4/19  Yes Emil Echevarria MD   metoprolol tartrate 75 MG TABS Take 75 mg by mouth 2 times daily 1/7/19  Yes Inez Baker APRN CNP   multivitamin w/minerals (THERA-VIT-M) tablet Take 1 tablet by mouth daily 1/24/19  Yes Emil Echevarria MD   mycophenolate (GENERIC EQUIVALENT) 250 MG capsule Take 250 mg morning and night 3/11 to 3/17, then take 1 tablet (250 mg) daily thru 3/24, then stop. 3/11/19  Yes Emil Echevarria MD   polyethylene glycol (MIRALAX/GLYCOLAX) packet Take 17 g by mouth daily as needed for constipation 2/26/19 2/26/20 Yes Emil Echevarria MD   polyvinyl alcohol (LIQUIFILM TEARS) 1.4 % ophthalmic solution Place 1 drop into both eyes as needed for dry eyes 2/8/19  Yes Jaswinder Anne MD   Sharps Container MISC 1 each daily 1/7/19  Yes Inez Baker APRN CNP   sulfamethoxazole-trimethoprim (BACTRIM/SEPTRA) 400-80 MG tablet Take 1 tablet by mouth daily 1/7/19  Yes Inez Baker APRN CNP   tacrolimus (GENERIC EQUIVALENT) 1 MG capsule Take 6 capsules (6 mg) by mouth 2 times daily 2/26/19  Yes Emil Echevarria MD   urea (GORMEL) 20 % external cream Apply as a moisturizer to your whole body everyday. 2/20/19  Yes Rolly Smith MD   ursodiol (ACTIGALL) 250 MG tablet Take 1 tablet (250 mg) by mouth 2 times daily 1/7/19  Yes Inez Baker APRN CNP   valGANciclovir (VALCYTE) 450 MG tablet Take 2 tablets (900 mg) by mouth daily 2/5/19  Yes Pauline Clancy MD   vitamin B6 (PYRIDOXINE) 50 MG TABS Take 1 tablet (50 mg) by mouth daily 2/8/19 4/9/19 Yes Pauline Clancy MD   ciprofloxacin (CIPRO) 500 MG tablet Take 1 tablet (500 mg) by mouth 2 times daily for 5 days 2/18/19 2/23/19  Omero Lawler MD   magnesium citrate solution Take 296 mLs by mouth daily for 3 days Take 1 bottle on Monday, Tuesday and Wednesday 3/11/19 3/14/19  Emil Echevarria MD  "  senna-docusate (SENOKOT-S/PERICOLACE) 8.6-50 MG tablet Take 2 tablets by mouth 2 times daily for 7 doses 2/26/19 3/2/19  Emil Echevarria MD   traMADol (ULTRAM) 50 MG tablet Take 1 tablet (50 mg) by mouth every 6 hours as needed for severe pain 2/25/19 3/7/19  Emil Echevarria MD       Allergies  No Known Allergies    Review of systems  A 10-point review of systems was negative    Examination  /68   Pulse 51   Temp 97.9  F (36.6  C) (Oral)   Ht 1.702 m (5' 7\")   Wt 76.5 kg (168 lb 9.6 oz)   SpO2 98%   BMI 26.41 kg/m    Body mass index is 26.41 kg/m .    Gen- well, NAD, A+Ox3, normal color  Lym- no palpable LAD  CVS- RRR  RS- CTA  Abd- Healed surgical scars. Not tender.  Extr- hands normal, no OSKAR  Skin- no rash or jaundice  Neuro- no asterixis  Psych- normal mood    Laboratory  Lab Results   Component Value Date     03/19/2019    POTASSIUM 5.2 03/19/2019    CHLORIDE 109 03/19/2019    CO2 26 03/19/2019    BUN 19 03/19/2019    CR 0.79 03/19/2019       Lab Results   Component Value Date    BILITOTAL 0.5 03/19/2019    ALT 16 03/19/2019    AST 9 03/19/2019    ALKPHOS 170 03/19/2019       Lab Results   Component Value Date    ALBUMIN 3.7 03/19/2019    PROTTOTAL 6.8 03/19/2019        Lab Results   Component Value Date    WBC 2.8 03/19/2019    HGB 10.4 03/19/2019    MCV 88 03/19/2019     03/19/2019       Lab Results   Component Value Date    INR 1.14 12/31/2018       Radiology    ASSESSMENT AND PLAN: Liver transplant. Mr. Loy Limon is a 63-year-old [place] immigrant who carries a diagnosis of alcoholic cirrhosis complicated by hepatocellular carcinoma.  He did have a liver transplantation on 12/22/2018.  His liver function tests are normal.  His creatinine is normal also.  He will be weaned off from the CellCept and eventually maintained on Prograf alone.  He is supposed to stop also his Bactrim sometime in June and his Valcyte on 03/22 of this year..  He will also follow with the " ID regarding his treatment with isoniazid which is also getting close to an end, please see the ID note.  As far as his abdominal pain is concerned, it might be scar related.  Otherwise, he is saying that now it is more tolerable and less than before.  For his history of HCC, he will have imaging 3 months from his transplant and he will need also to see our oncologists at least in the first 2 years.  He had a viable tumor on the explant.      This was a minute visit, of which more than 50% was spent in explaining to the patient what our plan of care.  We answered all his questions.       Tamela Carballo MD  Hepatology  [facility]

## 2019-03-19 NOTE — NURSING NOTE
"Chief Complaint   Patient presents with     RECHECK     liver tx     /68   Pulse 51   Temp 97.9  F (36.6  C) (Oral)   Ht 1.702 m (5' 7\")   Wt 76.5 kg (168 lb 9.6 oz)   SpO2 98%   BMI 26.41 kg/m    Charlotte Sherman MA    "

## 2019-03-19 NOTE — TELEPHONE ENCOUNTER
Here for post liver transplant follow-up.  Is now almost 3 mos post transplant.  Reports eating well, having regular bowel movements.  Still c/o pain on right side.  Dr. Carballo told him this could be related to his biliary stents. Denies fever.  He is due to have these changed / removed in late April.  Is having regular bowel movements.  He says he is doing well in terms of taking his medications.    Reviewed recent labs and assisted with interpretation.     Complaints / Concerns:  Right side discomfort    Current immunosuppression:    Weaning off Cellcept.  Will be done on 3/24.      Lab frequency:      Follow-up:  Hepatology, derm and PCP annually.  Reviewed my contact information, now to reach the transplant office during weekday and afterhours.

## 2019-03-19 NOTE — LETTER
3/19/2019       RE: Loy Limon  [address]  [facility] [address]     Dear Colleague,    Thank you for referring your patient, Loy Limon, to the [facility]PAT[facility] at [facility]. Please see a copy of my visit note below.    [facility]    Hepatology follow-up    CHIEF COMPLAINT:  Reason for the visit is status post liver transplantation for hepatocellular carcinoma.      SUBJECTIVE:  Mr. Limon is a 63-year-old [place] immigrant whom I have seen here for alcoholic liver disease complicated by portal hypertension.  He had the diagnosis made in 03/2018. At that time of his presentation he had  abdominal pain and on imaging he was found to have cirrhosis.  He also had lesions concerning for hepatocellular carcinoma.  He came here and we evaluated him for liver transplantation.  He was seen by IR on 06/13/2018 and a CT-guided microwave ablation was done and after that there was no viable tumor.  He got listed and got the transplant on 12/22/2018.  His explant had a viable tumor instead.  He also had problems with strictures and had Dr. Omero Lawler do ERCPs and had stents placed.  Now his liver function tests are normal and his creatinine is also normal.  He is very compliant with his medications and his main complaint is some right upper quadrant abdominal discomfort in the scar area.  Otherwise, he is moving his bowels 3 times a day with a stool softener.  He used MiraLax at one point and magnesium citrate, which was discontinued because of increased magnesium levels.  His weight has gone up lately.  Appetite is good.  No edema and he is supposed to follow also with our ID group for his latent TB.     Medical hx Surgical hx   Past Medical History:   Diagnosis Date     Biliary stricture of transplanted liver (H) 12/28/2018     Cancer (H)      Cirrhosis of liver (H) 5/8/2018     Enlarged prostate      Inguinal hernia       Liver lesion 2018     Liver transplanted (H) 2018     Portal vein thrombosis      Postoperative atrial fibrillation (H) 2018     Pre-diabetes 2018      Past Surgical History:   Procedure Laterality Date     APPENDECTOMY       COLONOSCOPY           ENDOSCOPIC RETROGRADE CHOLANGIOPANCREATOGRAM N/A 2018    Procedure: ENDOSCOPIC RETROGRADE CHOLANGIOPANCREATOGRAM, with Billary Sphincterotomy and Billary Stent Placement;  Surgeon: Omero Lawler MD;  Location: [facility] OR     ENDOSCOPIC RETROGRADE CHOLANGIOPANCREATOGRAM  2019    Procedure: COMBINED ENDOSCOPIC RETROGRADE CHOLANGIOPANCREATOGRAPHY, Bile Duct Stent Exchange;  Surgeon: Omero Lawler MD;  Location: [facility] OR     HERNIORRHAPHY INGUINAL  2018    Procedure: Herniorrhaphy inguinal;  Surgeon: Emil Echevarria MD;  Location: [facility] OR     IR LIVER BIOPSY PERCUTANEOUS  2018     TRANSPLANT LIVER RECIPIENT  DONOR N/A 2018    Procedure: TRANSPLANT LIVER RECIPIENT  DONOR;  Surgeon: Emil Echevarria MD;  Location: [facility] OR          Medications  Prior to Admission medications    Medication Sig Start Date End Date Taking? Authorizing Provider   alfuzosin ER (UROXATRAL) 10 MG 24 hr tablet Take 1 tablet (10 mg) by mouth daily 19  Yes Jaswinder Anne MD   amLODIPine (NORVASC) 10 MG tablet Take 1 tablet (10 mg) by mouth daily 19  Yes Inez Baker APRN CNP   aspirin (ASA) 325 MG EC tablet Take 1 tablet (325 mg) by mouth daily 19  Yes Inez Baker APRN CNP   blood glucose (NO BRAND SPECIFIED) lancets standard Use to test blood sugar 4 times daily or as directed. 19 Yes Inez Baker APRN CNP   blood glucose (ONETOUCH VERIO IQ) test strip Use to test blood sugar 4 times daily or as directed. 19 Yes Inez Baker APRN CNP   glucose 40 % (400 mg/mL) GEL gel Take 15-30 g by mouth every 15 minutes as needed for low blood sugar 19   Yes Inez Baker APRN CNP   insulin pen needle (32G X 4 MM) 32G X 4 MM miscellaneous Use once pen needles daily or as directed. 1/7/19 1/7/20 Yes Inez Baker APRN CNP   isoniazid (NYDRAZID) 300 MG tablet Take 1 tablet (300 mg) by mouth daily 2/8/19 4/9/19 Yes Pauline Clancy MD   magnesium citrate solution Take 296 mLs by mouth daily 3/4/19  Yes Emil Echevarria MD   metoprolol tartrate 75 MG TABS Take 75 mg by mouth 2 times daily 1/7/19  Yes Inez Baker APRN CNP   multivitamin w/minerals (THERA-VIT-M) tablet Take 1 tablet by mouth daily 1/24/19  Yes Emil Echevarria MD   mycophenolate (GENERIC EQUIVALENT) 250 MG capsule Take 250 mg morning and night 3/11 to 3/17, then take 1 tablet (250 mg) daily thru 3/24, then stop. 3/11/19  Yes Emil Echevarria MD   polyethylene glycol (MIRALAX/GLYCOLAX) packet Take 17 g by mouth daily as needed for constipation 2/26/19 2/26/20 Yes Emil Echevarria MD   polyvinyl alcohol (LIQUIFILM TEARS) 1.4 % ophthalmic solution Place 1 drop into both eyes as needed for dry eyes 2/8/19  Yes Jaswinder Anne MD   Sharps Container MISC 1 each daily 1/7/19  Yes Inez Baker APRN CNP   sulfamethoxazole-trimethoprim (BACTRIM/SEPTRA) 400-80 MG tablet Take 1 tablet by mouth daily 1/7/19  Yes Inez Baker APRN CNP   tacrolimus (GENERIC EQUIVALENT) 1 MG capsule Take 6 capsules (6 mg) by mouth 2 times daily 2/26/19  Yes Emil Echevarria MD   urea (GORMEL) 20 % external cream Apply as a moisturizer to your whole body everyday. 2/20/19  Yes Rolly Smith MD   ursodiol (ACTIGALL) 250 MG tablet Take 1 tablet (250 mg) by mouth 2 times daily 1/7/19  Yes Inez Baker APRN CNP   valGANciclovir (VALCYTE) 450 MG tablet Take 2 tablets (900 mg) by mouth daily 2/5/19  Yes Pauline Clancy MD   vitamin B6 (PYRIDOXINE) 50 MG TABS Take 1 tablet (50 mg) by mouth daily 2/8/19 4/9/19 Yes Pauline Clancy MD   ciprofloxacin  "(CIPRO) 500 MG tablet Take 1 tablet (500 mg) by mouth 2 times daily for 5 days 2/18/19 2/23/19  Omero Lawler MD   magnesium citrate solution Take 296 mLs by mouth daily for 3 days Take 1 bottle on Monday, Tuesday and Wednesday 3/11/19 3/14/19  Emil Echevarria MD   senna-docusate (SENOKOT-S/PERICOLACE) 8.6-50 MG tablet Take 2 tablets by mouth 2 times daily for 7 doses 2/26/19 3/2/19  Emil Echevarria MD   traMADol (ULTRAM) 50 MG tablet Take 1 tablet (50 mg) by mouth every 6 hours as needed for severe pain 2/25/19 3/7/19  Emil Echevarria MD       Allergies  No Known Allergies    Review of systems  A 10-point review of systems was negative    Examination  /68   Pulse 51   Temp 97.9  F (36.6  C) (Oral)   Ht 1.702 m (5' 7\")   Wt 76.5 kg (168 lb 9.6 oz)   SpO2 98%   BMI 26.41 kg/m     Body mass index is 26.41 kg/m .    Gen- well, NAD, A+Ox3, normal color  Lym- no palpable LAD  CVS- RRR  RS- CTA  Abd- Healed surgical scars. Not tender.  Extr- hands normal, no OSKAR  Skin- no rash or jaundice  Neuro- no asterixis  Psych- normal mood    Laboratory  Lab Results   Component Value Date     03/19/2019    POTASSIUM 5.2 03/19/2019    CHLORIDE 109 03/19/2019    CO2 26 03/19/2019    BUN 19 03/19/2019    CR 0.79 03/19/2019       Lab Results   Component Value Date    BILITOTAL 0.5 03/19/2019    ALT 16 03/19/2019    AST 9 03/19/2019    ALKPHOS 170 03/19/2019       Lab Results   Component Value Date    ALBUMIN 3.7 03/19/2019    PROTTOTAL 6.8 03/19/2019        Lab Results   Component Value Date    WBC 2.8 03/19/2019    HGB 10.4 03/19/2019    MCV 88 03/19/2019     03/19/2019       Lab Results   Component Value Date    INR 1.14 12/31/2018       Radiology    ASSESSMENT AND PLAN: Liver transplant. Mr. Loy Limon is a 63-year-old [place] immigrant who carries a diagnosis of alcoholic cirrhosis complicated by hepatocellular carcinoma.  He did have a liver transplantation on 12/22/2018.  " His liver function tests are normal.  His creatinine is normal also.  He will be weaned off from the CellCept and eventually maintained on Prograf alone.  He is supposed to stop also his Bactrim sometime in June and his Valcyte on 03/22 of this year..  He will also follow with the ID regarding his treatment with isoniazid which is also getting close to an end, please see the ID note.  As far as his abdominal pain is concerned, it might be scar related.  Otherwise, he is saying that now it is more tolerable and less than before.  For his history of HCC, he will have imaging 3 months from his transplant and he will need also to see our oncologists at least in the first 2 years.  He had a viable tumor on the explant.      This was a minute visit, of which more than 50% was spent in explaining to the patient what our plan of care.  We answered all his questions.       Tamela Carballo MD  Hepatology  [facility]

## 2019-03-19 NOTE — LETTER
3/19/2019      RE: Loy Limon  [address]  [facility] [address]       [facility]    Hepatology follow-up    CHIEF COMPLAINT:  Reason for the visit is status post liver transplantation for hepatocellular carcinoma.      SUBJECTIVE:  Mr. Limon is a 63-year-old [place] immigrant whom I have seen here for alcoholic liver disease complicated by portal hypertension.  He had the diagnosis made in 03/2018. At that time of his presentation he had  abdominal pain and on imaging he was found to have cirrhosis.  He also had lesions concerning for hepatocellular carcinoma.  He came here and we evaluated him for liver transplantation.  He was seen by IR on 06/13/2018 and a CT-guided microwave ablation was done and after that there was no viable tumor.  He got listed and got the transplant on 12/22/2018.  His explant had a viable tumor instead.  He also had problems with strictures and had Dr. Omero Lawler do ERCPs and had stents placed.  Now his liver function tests are normal and his creatinine is also normal.  He is very compliant with his medications and his main complaint is some right upper quadrant abdominal discomfort in the scar area.  Otherwise, he is moving his bowels 3 times a day with a stool softener.  He used MiraLax at one point and magnesium citrate, which was discontinued because of increased magnesium levels.  His weight has gone up lately.  Appetite is good.  No edema and he is supposed to follow also with our ID group for his latent TB.     Medical hx Surgical hx   Past Medical History:   Diagnosis Date     Biliary stricture of transplanted liver (H) 12/28/2018     Cancer (H)      Cirrhosis of liver (H) 5/8/2018     Enlarged prostate      Inguinal hernia      Liver lesion 5/8/2018     Liver transplanted (H) 12/22/2018     Portal vein thrombosis      Postoperative atrial fibrillation (H) 12/25/2018     Pre-diabetes 12/22/2018      Past Surgical History:    Procedure Laterality Date     APPENDECTOMY       COLONOSCOPY      2018     ENDOSCOPIC RETROGRADE CHOLANGIOPANCREATOGRAM N/A 2018    Procedure: ENDOSCOPIC RETROGRADE CHOLANGIOPANCREATOGRAM, with Billary Sphincterotomy and Billary Stent Placement;  Surgeon: Omero Lawler MD;  Location: [facility] OR     ENDOSCOPIC RETROGRADE CHOLANGIOPANCREATOGRAM  2019    Procedure: COMBINED ENDOSCOPIC RETROGRADE CHOLANGIOPANCREATOGRAPHY, Bile Duct Stent Exchange;  Surgeon: Omero Lawler MD;  Location: [facility] OR     HERNIORRHAPHY INGUINAL  2018    Procedure: Herniorrhaphy inguinal;  Surgeon: Emil Echevarria MD;  Location: [facility] OR     IR LIVER BIOPSY PERCUTANEOUS  2018     TRANSPLANT LIVER RECIPIENT  DONOR N/A 2018    Procedure: TRANSPLANT LIVER RECIPIENT  DONOR;  Surgeon: Emil Echevarria MD;  Location: [facility] OR          Medications  Prior to Admission medications    Medication Sig Start Date End Date Taking? Authorizing Provider   alfuzosin ER (UROXATRAL) 10 MG 24 hr tablet Take 1 tablet (10 mg) by mouth daily 19  Yes Jaswinder Anne MD   amLODIPine (NORVASC) 10 MG tablet Take 1 tablet (10 mg) by mouth daily 19  Yes Inez Baker APRN CNP   aspirin (ASA) 325 MG EC tablet Take 1 tablet (325 mg) by mouth daily 19  Yes Inez Baker APRN CNP   blood glucose (NO BRAND SPECIFIED) lancets standard Use to test blood sugar 4 times daily or as directed. 19 Yes Inez Baker APRN CNP   blood glucose (ONETOUCH VERIO IQ) test strip Use to test blood sugar 4 times daily or as directed. 19 Yes Inez Baker APRN CNP   glucose 40 % (400 mg/mL) GEL gel Take 15-30 g by mouth every 15 minutes as needed for low blood sugar 19  Yes Inez Baker APRN CNP   insulin pen needle (32G X 4 MM) 32G X 4 MM miscellaneous Use once pen needles daily or as directed. 19 Yes Inez Baker, APRN CNP    isoniazid (NYDRAZID) 300 MG tablet Take 1 tablet (300 mg) by mouth daily 2/8/19 4/9/19 Yes Pauline Clancy MD   magnesium citrate solution Take 296 mLs by mouth daily 3/4/19  Yes Emil Echevarria MD   metoprolol tartrate 75 MG TABS Take 75 mg by mouth 2 times daily 1/7/19  Yes Inez Baker APRN CNP   multivitamin w/minerals (THERA-VIT-M) tablet Take 1 tablet by mouth daily 1/24/19  Yes Emil Echevarria MD   mycophenolate (GENERIC EQUIVALENT) 250 MG capsule Take 250 mg morning and night 3/11 to 3/17, then take 1 tablet (250 mg) daily thru 3/24, then stop. 3/11/19  Yes Emil Echevarria MD   polyethylene glycol (MIRALAX/GLYCOLAX) packet Take 17 g by mouth daily as needed for constipation 2/26/19 2/26/20 Yes Emil Echevarria MD   polyvinyl alcohol (LIQUIFILM TEARS) 1.4 % ophthalmic solution Place 1 drop into both eyes as needed for dry eyes 2/8/19  Yes Jaswinder Anne MD   Sharps Container MISC 1 each daily 1/7/19  Yes Inez Baker APRN CNP   sulfamethoxazole-trimethoprim (BACTRIM/SEPTRA) 400-80 MG tablet Take 1 tablet by mouth daily 1/7/19  Yes Inez Baker APRN CNP   tacrolimus (GENERIC EQUIVALENT) 1 MG capsule Take 6 capsules (6 mg) by mouth 2 times daily 2/26/19  Yes Emil Echevarria MD   urea (GORMEL) 20 % external cream Apply as a moisturizer to your whole body everyday. 2/20/19  Yes Rolly Smith MD   ursodiol (ACTIGALL) 250 MG tablet Take 1 tablet (250 mg) by mouth 2 times daily 1/7/19  Yes Inez Baker APRN CNP   valGANciclovir (VALCYTE) 450 MG tablet Take 2 tablets (900 mg) by mouth daily 2/5/19  Yes Pauline Clancy MD   vitamin B6 (PYRIDOXINE) 50 MG TABS Take 1 tablet (50 mg) by mouth daily 2/8/19 4/9/19 Yes Pauline Clancy MD   ciprofloxacin (CIPRO) 500 MG tablet Take 1 tablet (500 mg) by mouth 2 times daily for 5 days 2/18/19 2/23/19  Omero Lawler MD   magnesium citrate solution Take 296 mLs by mouth daily for 3  "days Take 1 bottle on Monday, Tuesday and Wednesday 3/11/19 3/14/19  Emil Echevarria MD   senna-docusate (SENOKOT-S/PERICOLACE) 8.6-50 MG tablet Take 2 tablets by mouth 2 times daily for 7 doses 2/26/19 3/2/19  Emil Echevarria MD   traMADol (ULTRAM) 50 MG tablet Take 1 tablet (50 mg) by mouth every 6 hours as needed for severe pain 2/25/19 3/7/19  Emil Echevarria MD       Allergies  No Known Allergies    Review of systems  A 10-point review of systems was negative    Examination  /68   Pulse 51   Temp 97.9  F (36.6  C) (Oral)   Ht 1.702 m (5' 7\")   Wt 76.5 kg (168 lb 9.6 oz)   SpO2 98%   BMI 26.41 kg/m     Body mass index is 26.41 kg/m .    Gen- well, NAD, A+Ox3, normal color  Lym- no palpable LAD  CVS- RRR  RS- CTA  Abd- Healed surgical scars. Not tender.  Extr- hands normal, no OSKAR  Skin- no rash or jaundice  Neuro- no asterixis  Psych- normal mood    Laboratory  Lab Results   Component Value Date     03/19/2019    POTASSIUM 5.2 03/19/2019    CHLORIDE 109 03/19/2019    CO2 26 03/19/2019    BUN 19 03/19/2019    CR 0.79 03/19/2019       Lab Results   Component Value Date    BILITOTAL 0.5 03/19/2019    ALT 16 03/19/2019    AST 9 03/19/2019    ALKPHOS 170 03/19/2019       Lab Results   Component Value Date    ALBUMIN 3.7 03/19/2019    PROTTOTAL 6.8 03/19/2019        Lab Results   Component Value Date    WBC 2.8 03/19/2019    HGB 10.4 03/19/2019    MCV 88 03/19/2019     03/19/2019       Lab Results   Component Value Date    INR 1.14 12/31/2018       Radiology    ASSESSMENT AND PLAN: Liver transplant. Mr. Loy Limon is a 63-year-old [place] immigrant who carries a diagnosis of alcoholic cirrhosis complicated by hepatocellular carcinoma.  He did have a liver transplantation on 12/22/2018.  His liver function tests are normal.  His creatinine is normal also.  He will be weaned off from the CellCept and eventually maintained on Prograf alone.  He is supposed to stop also his " Bactrim sometime in June and his Valcyte on 03/22 of this year..  He will also follow with the ID regarding his treatment with isoniazid which is also getting close to an end, please see the ID note.  As far as his abdominal pain is concerned, it might be scar related.  Otherwise, he is saying that now it is more tolerable and less than before.  For his history of HCC, he will have imaging 3 months from his transplant and he will need also to see our oncologists at least in the first 2 years.  He had a viable tumor on the explant.      This was a minute visit, of which more than 50% was spent in explaining to the patient what our plan of care.  We answered all his questions.       Tamela Carballo MD  Hepatology  [facility]

## 2019-03-20 LAB — ETHYL GLUCURONIDE UR QL: NEGATIVE

## 2019-03-25 ENCOUNTER — TELEPHONE (OUTPATIENT)
Dept: GASTROENTEROLOGY | Facility: CLINIC | Age: 64
End: 2019-03-25

## 2019-03-25 LAB
ALBUMIN SERPL-MCNC: 3.7 G/DL (ref 3.4–5)
ALP SERPL-CCNC: 166 U/L (ref 40–150)
ALT SERPL W P-5'-P-CCNC: 17 U/L (ref 0–70)
ANION GAP SERPL CALCULATED.3IONS-SCNC: 9 MMOL/L (ref 3–14)
AST SERPL W P-5'-P-CCNC: 9 U/L (ref 0–45)
BILIRUB DIRECT SERPL-MCNC: 0.1 MG/DL (ref 0–0.2)
BILIRUB SERPL-MCNC: 0.7 MG/DL (ref 0.2–1.3)
BUN SERPL-MCNC: 18 MG/DL (ref 7–30)
CALCIUM SERPL-MCNC: 8.6 MG/DL (ref 8.5–10.1)
CHLORIDE SERPL-SCNC: 108 MMOL/L (ref 94–109)
CO2 SERPL-SCNC: 24 MMOL/L (ref 20–32)
CREAT SERPL-MCNC: 0.73 MG/DL (ref 0.66–1.25)
ERYTHROCYTE [DISTWIDTH] IN BLOOD BY AUTOMATED COUNT: 12.9 % (ref 10–15)
GFR SERPL CREATININE-BSD FRML MDRD: >90 ML/MIN/{1.73_M2}
GLUCOSE SERPL-MCNC: 147 MG/DL (ref 70–99)
HCT VFR BLD AUTO: 35 % (ref 40–53)
HGB BLD-MCNC: 11.5 G/DL (ref 13.3–17.7)
MAGNESIUM SERPL-MCNC: 1.9 MG/DL (ref 1.6–2.3)
MCH RBC QN AUTO: 28.5 PG (ref 26.5–33)
MCHC RBC AUTO-ENTMCNC: 32.9 G/DL (ref 31.5–36.5)
MCV RBC AUTO: 87 FL (ref 78–100)
PHOSPHATE SERPL-MCNC: 4.1 MG/DL (ref 2.5–4.5)
PLATELET # BLD AUTO: 171 10E9/L (ref 150–450)
POTASSIUM SERPL-SCNC: 4.9 MMOL/L (ref 3.4–5.3)
PROT SERPL-MCNC: 7 G/DL (ref 6.8–8.8)
RBC # BLD AUTO: 4.04 10E12/L (ref 4.4–5.9)
SODIUM SERPL-SCNC: 141 MMOL/L (ref 133–144)
TACROLIMUS BLD-MCNC: 8.7 UG/L (ref 5–15)
TME LAST DOSE: NORMAL H
WBC # BLD AUTO: 2.6 10E9/L (ref 4–11)

## 2019-03-25 PROCEDURE — 85027 COMPLETE CBC AUTOMATED: CPT | Performed by: TRANSPLANT SURGERY

## 2019-03-25 PROCEDURE — 80076 HEPATIC FUNCTION PANEL: CPT | Performed by: TRANSPLANT SURGERY

## 2019-03-25 PROCEDURE — 84100 ASSAY OF PHOSPHORUS: CPT | Performed by: TRANSPLANT SURGERY

## 2019-03-25 PROCEDURE — 80197 ASSAY OF TACROLIMUS: CPT | Performed by: TRANSPLANT SURGERY

## 2019-03-25 PROCEDURE — 83735 ASSAY OF MAGNESIUM: CPT | Performed by: TRANSPLANT SURGERY

## 2019-03-25 PROCEDURE — 80048 BASIC METABOLIC PNL TOTAL CA: CPT | Performed by: TRANSPLANT SURGERY

## 2019-03-25 NOTE — TELEPHONE ENCOUNTER
M for patient's daughter Kaylene informing her of patient's scheduled procedure with Dr. Lawler. Asked Kaylene to call back to go over scheduling details.     3/25/19 1045am

## 2019-03-26 ENCOUNTER — TELEPHONE (OUTPATIENT)
Dept: GASTROENTEROLOGY | Facility: CLINIC | Age: 64
End: 2019-03-26

## 2019-03-26 NOTE — TELEPHONE ENCOUNTER
LVM for patient's daughter Kaylene regarding scheduled procedure for patient. Asked Kaylene to call back to go over scheduling details.     3/26/19 114pm

## 2019-03-27 ENCOUNTER — TELEPHONE (OUTPATIENT)
Dept: GASTROENTEROLOGY | Facility: CLINIC | Age: 64
End: 2019-03-27

## 2019-03-27 NOTE — TELEPHONE ENCOUNTER
Spoke to patient's daughter Kaylene in regards to scheduled procedure for patient. Informed Kaylene the patient is scheduled with Dr. Lawler on 4/29/19. Informed Kaylene the patient will need an updated pre-op physical within 30 days of his procedure. Kaylene stated the patient is seeing his PCP on 4/10/19 and he will have it done then. Informed Kaylene the patient will need a  and someone to monitor him for 24 hours after the procedure. Informed Kaylene all scheduling details will be sent to the address listed on Epic. Address confirmed on this call.     3/27/19 1025AM

## 2019-03-28 DIAGNOSIS — R97.20 ELEVATED PROSTATE SPECIFIC ANTIGEN (PSA): Primary | ICD-10-CM

## 2019-04-01 ENCOUNTER — TELEPHONE (OUTPATIENT)
Dept: TRANSPLANT | Facility: CLINIC | Age: 64
End: 2019-04-01

## 2019-04-01 LAB
ALBUMIN SERPL-MCNC: 3.6 G/DL (ref 3.4–5)
ALP SERPL-CCNC: 178 U/L (ref 40–150)
ALT SERPL W P-5'-P-CCNC: 20 U/L (ref 0–70)
ANION GAP SERPL CALCULATED.3IONS-SCNC: 5 MMOL/L (ref 3–14)
AST SERPL W P-5'-P-CCNC: 10 U/L (ref 0–45)
BILIRUB DIRECT SERPL-MCNC: 0.1 MG/DL (ref 0–0.2)
BILIRUB SERPL-MCNC: 0.5 MG/DL (ref 0.2–1.3)
BUN SERPL-MCNC: 26 MG/DL (ref 7–30)
CALCIUM SERPL-MCNC: 8.4 MG/DL (ref 8.5–10.1)
CHLORIDE SERPL-SCNC: 108 MMOL/L (ref 94–109)
CO2 SERPL-SCNC: 25 MMOL/L (ref 20–32)
CREAT SERPL-MCNC: 0.69 MG/DL (ref 0.66–1.25)
ERYTHROCYTE [DISTWIDTH] IN BLOOD BY AUTOMATED COUNT: 12.6 % (ref 10–15)
GFR SERPL CREATININE-BSD FRML MDRD: >90 ML/MIN/{1.73_M2}
GLUCOSE SERPL-MCNC: 258 MG/DL (ref 70–99)
HCT VFR BLD AUTO: 32.6 % (ref 40–53)
HGB BLD-MCNC: 10.7 G/DL (ref 13.3–17.7)
MAGNESIUM SERPL-MCNC: 2 MG/DL (ref 1.6–2.3)
MCH RBC QN AUTO: 28.1 PG (ref 26.5–33)
MCHC RBC AUTO-ENTMCNC: 32.8 G/DL (ref 31.5–36.5)
MCV RBC AUTO: 86 FL (ref 78–100)
PHOSPHATE SERPL-MCNC: 3.3 MG/DL (ref 2.5–4.5)
PLATELET # BLD AUTO: 177 10E9/L (ref 150–450)
POTASSIUM SERPL-SCNC: 4.9 MMOL/L (ref 3.4–5.3)
PROT SERPL-MCNC: 6.7 G/DL (ref 6.8–8.8)
RBC # BLD AUTO: 3.81 10E12/L (ref 4.4–5.9)
SODIUM SERPL-SCNC: 138 MMOL/L (ref 133–144)
TACROLIMUS BLD-MCNC: 8.4 UG/L (ref 5–15)
TME LAST DOSE: NORMAL H
WBC # BLD AUTO: 3.4 10E9/L (ref 4–11)

## 2019-04-01 PROCEDURE — 85027 COMPLETE CBC AUTOMATED: CPT | Performed by: TRANSPLANT SURGERY

## 2019-04-01 PROCEDURE — 83735 ASSAY OF MAGNESIUM: CPT | Performed by: TRANSPLANT SURGERY

## 2019-04-01 PROCEDURE — 80076 HEPATIC FUNCTION PANEL: CPT | Performed by: TRANSPLANT SURGERY

## 2019-04-01 PROCEDURE — 80048 BASIC METABOLIC PNL TOTAL CA: CPT | Performed by: TRANSPLANT SURGERY

## 2019-04-01 PROCEDURE — 84100 ASSAY OF PHOSPHORUS: CPT | Performed by: TRANSPLANT SURGERY

## 2019-04-01 PROCEDURE — 80197 ASSAY OF TACROLIMUS: CPT | Performed by: TRANSPLANT SURGERY

## 2019-04-01 NOTE — TELEPHONE ENCOUNTER
MOrning blood sugars are high.  Please ask if these are fasting sugars.  If no, no intervention.  If they are fasting sugars they are too high and he needs to make an appt w/ his PCP for assistance w/ management.

## 2019-04-01 NOTE — TELEPHONE ENCOUNTER
Instructed pt through interpretor Jacquelin, that pt's blood sugar was elevated. Pt states that he did eat this morning. Instructed pt the need to follow up with PCP. Pt understood.

## 2019-04-02 ENCOUNTER — ANCILLARY PROCEDURE (OUTPATIENT)
Dept: GENERAL RADIOLOGY | Facility: CLINIC | Age: 64
End: 2019-04-02
Attending: OTOLARYNGOLOGY
Payer: COMMERCIAL

## 2019-04-02 ENCOUNTER — THERAPY VISIT (OUTPATIENT)
Dept: SPEECH THERAPY | Facility: CLINIC | Age: 64
End: 2019-04-02
Attending: OTOLARYNGOLOGY
Payer: COMMERCIAL

## 2019-04-02 DIAGNOSIS — R13.10 DYSPHAGIA, UNSPECIFIED TYPE: ICD-10-CM

## 2019-04-02 RX ORDER — BARIUM SULFATE 400 MG/ML
30 SUSPENSION ORAL ONCE
Status: COMPLETED | OUTPATIENT
Start: 2019-04-02 | End: 2019-04-02

## 2019-04-02 RX ADMIN — BARIUM SULFATE 30 ML: 400 SUSPENSION ORAL at 11:37

## 2019-04-04 NOTE — PROGRESS NOTES
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  Pt presented to Dr. Hull on 3/14/2019 with c/o chronic cough and dysphagia which has been ongoing for ~1 year.  Pt was found to have a mass in the anterior aspect of the left nostril. The pt was then referred for a video swallow study, a consultation with Dr. Abel Merlos/ Dr. Josselin Yang for his hoarseness, and Dr. Jasvir Robles for evaluation of the nasal mass.  Pt presents this date for video swallow study procedure.  Pt describes a sensation of phlegm and frequent throat clearing as well as difficulty with drier items.  Pt reported that he is not currently modifying his intake behaviors and denies food avoidance or weight loss.  Pt denies a history of reflux.      Respiratory Status Room air   Prior Level Of Function Swallowing   Prior Level Of Function Comment Regular textures and thin liquids    General Observations WFL    Patient/family Goals Pt would like to understand why he is throat clearing all the time.     Clinical Swallow Evaluation   Oral Musculature generally intact  (Nasal mass)   Structural Abnormalities none present   Dentition present and adequate   Mucosal Quality good   Mandibular Strength and Mobility intact   Oral Labial Strength and Mobility WFL   Lingual Strength and Mobility WFL   Velar Elevation intact   Buccal Strength and Mobility intact   Laryngeal  Function Cough;Throat clear;Voicing initiated;Swallow   Oral Musculature Comments WFL   VFSS Evaluation   VFSS Additional Documentation Yes   VFSS Eval: Radiology   Radiologist [facility] Radiologist    Views Taken left lateral;A/P   Physical Location of Procedure [facility] Radiology #2   VFSS Eval: Thin Liquid Texture Trial   Mode of Presentation, Thin Liquid cup;straw;self-fed   Order of Presentation 1, 2, 3, 4, 5, 11, 12, 14   Preparatory Phase WFL   Oral Phase, Thin Liquid Premature pharyngeal entry;Residue in oral cavity   Pharyngeal Phase, Thin Liquid Delayed swallow reflex;Residue in valleculae;Residue in pyriform sinus   Rosenbek's Penetration Aspiration Scale: Thin Liquid Trial Results 2 - contrast enters airway, remains above the vocal cords, no residue remains (penetration)   Successful Strategies Trialed During Procedure, Thin Liquid chin tuck   Diagnostic Statement Swallow triggered at the level of the pyriforms.  Tongue base residue remaining following swallow response upon smaller presentations.  Portion of bolus held in oral cavity with larger presentation.  No evidence of aspiration but intermittent, flash penetration observed.  Residue clearing from valleculae and pyriforms after swallow repsonse but evident upon flouro for next frame; suspect residue from oral cavity.  Cervical osteophyte noted at C5.   VFSS Eval: Nectar Thick Liquid Texture Trial   Mode of Presentation, Nectar cup;self-fed   Order of Presentation 6, 7   Preparatory Phase WFL   Oral Phase, Nectar Premature pharyngeal entry   Pharyngeal Phase, Nectar Delayed swallow reflex;Residue in pyriform sinus  (Minimal )   Rosenbek's Penetration Aspiration Scale: Nectar-Thick Liquid Trial Results 1 - no aspiration, contrast does not enter airway   Diagnostic Statement Swallow triggered just beyond the valleculae.  No evidence of aspiration or penetration.     VFSS Eval: Puree Solid Texture Trial   Mode of Presentation, Puree self-fed    Order of Presentation 8   Preparatory Phase WFL   Oral Phase, Puree Poor AP movement;Premature pharyngeal entry   Pharyngeal Phase, Puree Residue in pyriform sinus;Delayed swallow reflex  (Minimal )   Rosenbek's Penetration Aspiration Scale: Puree Food Trial Results 1 - no aspiration, contrast does not enter airway   Diagnostic Statement Slow oral transit with swallow triggered at the level of the valleculae.  Minimal amounts of residue remaining in the pyriforms following swallow response.     VFSS Eval: Solid Food Texture Trial   Mode of Presentation, Solid self-fed   Order of Presentation 9, 10, 13  (Barium tablet #13)   Preparatory Phase WFL   Oral Phase, Solid Premature pharyngeal entry;Poor AP movement   Pharyngeal Phase, Solid Delayed swallow reflex;Residue in pyriform sinus  (Minimal )   Rosenbek's Penetration Aspiration Scale: Solid Food Trial Results 1 - no aspiration, contrast does not enter airway   Diagnostic Statement Swallow triggered at the level of valleculae with minimal residue remaining at the pyriforms.  Reduced oral transit of barium tablet observed. Once cleared to the pharynx, tablet retained in pharynx then cleared fully to esophagus.     Swallow Compensations   Swallow Compensations Alternate viscosity of consistencies;Pacing;Reduce amounts;Multiple swallow   Educational Assessment   Barriers to Learning No barriers   Esophageal Phase of Swallow   Patient reports or presents with symptoms of esophageal dysphagia No   General Therapy Interventions   Planned Therapy Interventions Dysphagia Treatment   Dysphagia treatment Compensatory strategies for swallowing;Instruction of safe swallow strategies;Oropharyngeal exercise training   Swallow Eval: Clinical Impressions   Skilled Criteria for Therapy Intervention Skilled criteria met.  Treatment indicated.   Dysphagia Outcome Severity Scale (LUI) Level 5 - LUI   Treatment Diagnosis Oropharyngeal dysphagia    Diet texture recommendations Thin  liquids;Regular diet   Recommended Feeding/Eating Techniques alternate between small bites and sips of food/liquid;maintain upright posture during/after eating for 30 mins;small sips/bites   Rehab Potential good, to achieve stated therapy goals   Therapy Frequency other (see comments)  (Every other week )   Predicted Duration of Therapy Intervention (days/wks) 12 weeks    Anticipated Discharge Disposition home w/ outpatient services   Risks and Benefits of Treatment have been explained. Yes   Patient, family and/or staff in agreement with Plan of Care Yes   Clinical Impression Comments Pt seen for video swallow study per MD orders following complaints of difficulty swallowing.  Pt presents wtih mild oropharyngeal dysphagia under flouroscopy characterized by weakness.  A  Oral mechanism exam unremarkable; however, oral phase of swallow noted to be slowed and executed with reduced AP movement.  Oral residue observed with smaller bolus presentations and pt exhibited intentional bolus hold with larger presentations.  Pharyngeal swallow response triggered at the level of the pyriforms upon thin liquids trials with intermittent penetration observed.  Upon items of increased viscosity, swallow triggered closer to the valleculae with no evidence of penetration observed.  No aspiration noted throughout the study.  Reduced anterior movement of the hylolaryngeal structures noted with minimal amounts of residue noted in the pyriforms.  Despite evidence of dysphagia, pt is appropriate for regular textures and thin liquids when self selecting softer, preferred items.  Pt should also consider moistening items with sauces or gravies.  Pt will benefit from taking small bites/sips, pace self, alternate consistencies, and remain upright after intake.  Pt should consider crushing pills and serving them in pureed textures.  Given current level of skills, pt would benefit from ongoing ST in order to assess diet tolerance, train  aspiration precuations/strategies, and instruct on oropharyngeal strengthening exercises.     Swallow Goals   SLP Swallow Goals 1;2   Swallow Goal 1   Goal Identifier 1   Goal Description Pt will demonstrated understanding and indpendent implementation of aspiration precuations and safe swallow strategies on 4/5 opp.     Target Date 07/02/19   Swallow Goal 2   Goal Identifier 2   Goal Description Pt will demonstrated understanding and independent completion of oropharyngeal strengthening exercises with 90% accuracy.    Target Date 07/02/19   Total Session Time   SLP Eval: VideoFluoroscopic Swallow function Minutes (68219) 20   Total Evaluation Time 20     Thank you for the referral of Loy Limon.  If you have any questions about this report, please contact me using the information below.       Cora Cunningham M.S. CCC-SLP  Speech Language Pathologist   [facility]   Department of Otoolaryngology, D&T- 4th Floor / [id number]  Phone: [phone]  Pager: [phone]  Email: [email or web address]

## 2019-04-04 NOTE — PROGRESS NOTES
OUTPATIENT SWALLOW  EVALUATION  PLAN OF TREATMENT FOR OUTPATIENT REHABILITATION  (COMPLETE FOR INITIAL CLAIMS ONLY)  Patient's Last Name, First Name, M.I.  YOB: 1955  Loy Limon        Provider's Name   Cora Cunningham   Medical Record No.  [id number]     Start of Care Date:  04/02/19   Onset Date:  01/01/18   Type:     ___PT   ____OT  ___X_SLP Medical Diagnosis:  Chronic cough     Treatment Diagnosis:  Oropharyngeal dysphagia  Visits from SOC:  1     _________________________________________________________________________________  Plan of Treatment/Functional Goals:  Planned Therapy Interventions: Dysphagia Treatment  Dysphagia treatment: Compensatory strategies for swallowing, Instruction of safe swallow strategies, Oropharyngeal exercise training      Goals   1. Goal Identifier: 1       Goal Description: Pt will demonstrated understanding and indpendent implementation of aspiration precuations and safe swallow strategies on 4/5 opp.         Target Date: 07/02/19           2. Goal Identifier: 2       Goal Description: Pt will demonstrated understanding and independent completion of oropharyngeal strengthening exercises with 90% accuracy.        Target Date: 07/02/19             Therapy Frequency: other (see comments)(Every other week )  Predicted Duration of Therapy Intervention (days/wks): 12 weeks     Cora Cunningham, SLP       I CERTIFY THE NEED FOR THESE SERVICES FURNISHED UNDER        THIS PLAN OF TREATMENT AND WHILE UNDER MY CARE     (Physician attestation of this document indicates review and certification of the therapy plan).                               Referring Physician: Kevin Hull MD     Initial Assessment        See Epic Evaluation Start Of Care Date: 04/02/19

## 2019-04-10 ENCOUNTER — OFFICE VISIT (OUTPATIENT)
Dept: FAMILY MEDICINE | Facility: CLINIC | Age: 64
End: 2019-04-10
Payer: COMMERCIAL

## 2019-04-10 VITALS
WEIGHT: 172 LBS | BODY MASS INDEX: 26.94 KG/M2 | OXYGEN SATURATION: 99 % | HEART RATE: 50 BPM | SYSTOLIC BLOOD PRESSURE: 106 MMHG | DIASTOLIC BLOOD PRESSURE: 60 MMHG

## 2019-04-10 DIAGNOSIS — R13.10 SWALLOWING DYSFUNCTION: ICD-10-CM

## 2019-04-10 DIAGNOSIS — R73.9 HYPERGLYCEMIA: ICD-10-CM

## 2019-04-10 DIAGNOSIS — R73.9 HYPERGLYCEMIA: Primary | ICD-10-CM

## 2019-04-10 DIAGNOSIS — Z94.4 LIVER REPLACED BY TRANSPLANT (H): ICD-10-CM

## 2019-04-10 DIAGNOSIS — D64.9 ANEMIA, UNSPECIFIED TYPE: ICD-10-CM

## 2019-04-10 LAB
ALBUMIN SERPL-MCNC: 3.9 G/DL (ref 3.4–5)
ALP SERPL-CCNC: 198 U/L (ref 40–150)
ALT SERPL W P-5'-P-CCNC: 20 U/L (ref 0–70)
ANION GAP SERPL CALCULATED.3IONS-SCNC: 5 MMOL/L (ref 3–14)
AST SERPL W P-5'-P-CCNC: 14 U/L (ref 0–45)
BILIRUB DIRECT SERPL-MCNC: 0.1 MG/DL (ref 0–0.2)
BILIRUB SERPL-MCNC: 0.4 MG/DL (ref 0.2–1.3)
BUN SERPL-MCNC: 26 MG/DL (ref 7–30)
CALCIUM SERPL-MCNC: 8.7 MG/DL (ref 8.5–10.1)
CHLORIDE SERPL-SCNC: 108 MMOL/L (ref 94–109)
CO2 SERPL-SCNC: 23 MMOL/L (ref 20–32)
CREAT SERPL-MCNC: 0.79 MG/DL (ref 0.66–1.25)
ERYTHROCYTE [DISTWIDTH] IN BLOOD BY AUTOMATED COUNT: 12.6 % (ref 10–15)
GFR SERPL CREATININE-BSD FRML MDRD: >90 ML/MIN/{1.73_M2}
GLUCOSE SERPL-MCNC: 223 MG/DL (ref 70–99)
HBA1C MFR BLD: 8.6 % (ref 0–5.6)
HCT VFR BLD AUTO: 35.3 % (ref 40–53)
HGB BLD-MCNC: 11.2 G/DL (ref 13.3–17.7)
MAGNESIUM SERPL-MCNC: 2.2 MG/DL (ref 1.6–2.3)
MCH RBC QN AUTO: 27.9 PG (ref 26.5–33)
MCHC RBC AUTO-ENTMCNC: 31.7 G/DL (ref 31.5–36.5)
MCV RBC AUTO: 88 FL (ref 78–100)
PHOSPHATE SERPL-MCNC: 4.3 MG/DL (ref 2.5–4.5)
PLATELET # BLD AUTO: 176 10E9/L (ref 150–450)
POTASSIUM SERPL-SCNC: 5.1 MMOL/L (ref 3.4–5.3)
PROT SERPL-MCNC: 7 G/DL (ref 6.8–8.8)
RBC # BLD AUTO: 4.02 10E12/L (ref 4.4–5.9)
SODIUM SERPL-SCNC: 136 MMOL/L (ref 133–144)
VIT B12 SERPL-MCNC: 916 PG/ML (ref 193–986)
WBC # BLD AUTO: 3.6 10E9/L (ref 4–11)

## 2019-04-10 ASSESSMENT — PAIN SCALES - GENERAL: PAINLEVEL: MILD PAIN (3)

## 2019-04-10 NOTE — PROGRESS NOTES
[facility]   Jaswinder Anne MD  04/10/2019      Chief Complaint:   Numbness   Abdominal Pain       History of Present Illness: The patient's clinic visit was completed with the assistance of a .  Loy Limon is an immunosuppressed 63 year old male with a history of hepatocellular carcinoma with cirrhosis and biliary stricture s/p liver transplant, drug-induced diabetes mellitus, inguinal hernia s/p repair, and appendectomy who presents for evaluation of numbness and abdominal pain.    Upper GI concerns: About one year ago, the patient experienced an episode of losing consciousness after getting food stuck in his throat and being unable to breathing. At that time, the patient describes a bystander performing CPR breathing to help the patient. Since, he has been experiencing voice and throat problems as well as difficulty swallowing which concern him due to this past episode. More recently, he describes some of his food remaining in his throat and then coming back up. If he is able to get food to move down towards his stomach, then he does not have any issues. He reports food occasionally sticking in his throat. The patient denies any issues consuming fluids. The patient completed an endoscopy a few weeks ago as well as a swallow test. He has also been evaluated by the ENT doctor. Overall, the patient's testing has not revealed any pathologic concerns thus far.      Numbness: For years, the patient has been experiencing bilateral constant numbness on the sole of his feet. He denies tingling though notes a hot, burning sensation. The patient does report checking his blood sugars daily. He works to control his diet in order to manage his drug-induced diabetes mellitus.      Review of Systems:   Pertinent items are noted in HPI, remainder of complete ROS is negative.      Active Medications:      alfuzosin ER (UROXATRAL) 10 MG 24 hr tablet, Take 1 tablet (10 mg) by mouth  daily, Disp: 90 tablet, Rfl: 3     amLODIPine (NORVASC) 10 MG tablet, Take 1 tablet (10 mg) by mouth daily, Disp: 30 tablet, Rfl: 11     aspirin (ASA) 325 MG EC tablet, Take 1 tablet (325 mg) by mouth daily, Disp: 30 tablet, Rfl: 5     glucose 40 % (400 mg/mL) GEL gel, Take 15-30 g by mouth every 15 minutes as needed for low blood sugar, Disp: 30 g, Rfl: 3     insulin pen needle (32G X 4 MM) 32G X 4 MM miscellaneous, Use once pen needles daily or as directed., Disp: 100 each, Rfl: 3     magnesium citrate solution, Take 296 mLs by mouth daily, Disp: 296 mL, Rfl: 0     metoprolol tartrate 75 MG TABS, Take 75 mg by mouth 2 times daily, Disp: 60 tablet, Rfl: 11     multivitamin w/minerals (THERA-VIT-M) tablet, Take 1 tablet by mouth daily, Disp: 90 tablet, Rfl: 3     mycophenolate (GENERIC EQUIVALENT) 250 MG capsule, Take 250 mg morning and night 3/11 to 3/17, then take 1 tablet (250 mg) daily thru 3/24, then stop., Disp: 120 capsule, Rfl: 11     polyethylene glycol (MIRALAX/GLYCOLAX) packet, Take 17 g by mouth daily as needed for constipation, Disp: 7 packet, Rfl: 1     polyvinyl alcohol (LIQUIFILM TEARS) 1.4 % ophthalmic solution, Place 1 drop into both eyes as needed for dry eyes, Disp: 15 mL, Rfl: 3     sulfamethoxazole-trimethoprim (BACTRIM/SEPTRA) 400-80 MG tablet, Take 1 tablet by mouth daily, Disp: 30 tablet, Rfl: 5     tacrolimus (GENERIC EQUIVALENT) 1 MG capsule, Take 6 capsules (6 mg) by mouth 2 times daily, Disp: 360 capsule, Rfl: 3     urea (GORMEL) 20 % external cream, Apply as a moisturizer to your whole body everyday., Disp: 480 g, Rfl: 5     ursodiol (ACTIGALL) 250 MG tablet, Take 1 tablet (250 mg) by mouth 2 times daily, Disp: 60 tablet, Rfl: 11     valGANciclovir (VALCYTE) 450 MG tablet, Take 2 tablets (900 mg) by mouth daily, Disp: 69 tablet, Rfl: 0     Allergies:   The patient has no known allergies.      Past Medical History:  Biliary stricture of transplanted liver   Hepatocellular carcinoma    Cirrhosis of liver   Enlarged prostate   Inguinal hernia   Liver lesion   Liver transplant   Portal vein thrombosis   Postoperative atrial fibrillation with rapid ventricular response   Immunosuppressed status   Hypervolemia   Essential hypertension   Drug-induced diabetes mellitus with hyperglycemia      Past Surgical History:  Appendectomy   Endoscopic retrograde cholangiopancreatogram   Inguinal herniorrhaphy   Liver biopsy percutaneous   Transplant liver recipient,  donor    Family History:   Liver disease      Social History:   The patient is , a former smoker (quit date: 2018), and does not consume alcohol.     Physical Exam:   /60   Pulse 50   Wt 78 kg (172 lb)   SpO2 99%   BMI 26.94 kg/m        Constitutional: Alert. In no distress.  Head: Normocephalic. No masses, lesions, or abnormalities.  Foot: Bilaterally normal with monofilament. No edema. Well perfused.   Neurologic: Gait normal. Reflexes normal and symmetric. Sensation grossly WNL.  Psychiatric: Mentation appears normal. Normal affect.      Assessment and Plan:  1. Bilateral foot burning   Patient has been experiencing for years. His problem list includes diabetes though he is not currently taking any medication for this. His last random blood glucose level was found to be 258. HbA1c and B12 laboratory studies order for further evaluation in addition to a history of anemia.   - Hemoglobin A1c  - Vitamin B12    2. Swallowing dysfunction   Patient recently saw ENT and completed an endoscopy and swallow study. I reviewed these tests. I suspect his problem is either phlegm or food seeming to be stuck in his throat. Since his studies were all normal and he has another endoscopy scheduled later this month, I did not order any more test. However we may consider empiric omeprazole or Mucinex.        Follow-up: Return in about 3 months (around 7/10/2019).         Scribe Disclosure:  I, Cora Sanchez, am serving as a scribe  to document services personally performed by Jaswinder Anne MD at this visit, based upon the provider's statements to me. All documentation has been reviewed by the aforementioned provider prior to being entered into the official medical record.  Jaswinder Anne MD

## 2019-04-10 NOTE — NURSING NOTE
Chief Complaint   Patient presents with     Numbness     Pt is here to discuss ongoing numbness in both feet.      Abdominal Pain     Pt is here to discuss upper abdominal pain.      Radha Mejias LPN at 3:36 PM on 4/10/2019.

## 2019-04-10 NOTE — PATIENT INSTRUCTIONS
[facility] Medication Refill Request Information:  * Please contact your pharmacy regarding ANY request for medication refills.  ** [facility] Prescription Fax = [phone]  * Please allow 3 business days for routine medication refills.  * Please allow 5 business days for controlled substance medication refills.     [facility] Test Result notification information:  *You will be notified with in 7-10 days of your appointment day regarding the results of your test.  If you are on MyChart you will be notified as soon as the provider has reviewed the results and signed off on them.    [facility]: [phone]

## 2019-04-11 LAB
TACROLIMUS BLD-MCNC: 11.1 UG/L (ref 5–15)
TME LAST DOSE: NORMAL H

## 2019-04-12 ENCOUNTER — TELEPHONE (OUTPATIENT)
Dept: INTERNAL MEDICINE | Facility: CLINIC | Age: 64
End: 2019-04-12

## 2019-04-12 NOTE — TELEPHONE ENCOUNTER
spoke wt pt through a , informed the results and recommendations as below. Pt agreed to come. I coordinated scheduling.    Soon-Mi  ------------------------------------------------------------          ----- Message from Jaswinder Anne MD sent at 4/11/2019  3:48 PM CDT -----  His sugars are up and need treatment, new for him, can you let him know to see me within a month to discuss high blood sugars

## 2019-04-15 ENCOUNTER — TELEPHONE (OUTPATIENT)
Dept: PHARMACY | Facility: CLINIC | Age: 64
End: 2019-04-15

## 2019-04-18 DIAGNOSIS — Z94.4 LIVER TRANSPLANT RECIPIENT (H): ICD-10-CM

## 2019-04-18 NOTE — TELEPHONE ENCOUNTER
Pt called with the     Patient Call: Medication Refill  Route to LPN  Instruct the patient to first contact their pharmacy. If they have called their pharmacy and require further assistance, route to LPN.    Pharmacy Name:   [facility] [place], [place] - [address] [phone] (Phone)  [phone] (Fax)       Name of Medication:   aspirin (ASA) 325 MG EC tablet    When will the patient be out of this medication?: Less than 3 days (Route high priority)     Please call pt when the Rx has been sent over

## 2019-04-19 RX ORDER — ASPIRIN 325 MG
325 TABLET, DELAYED RELEASE (ENTERIC COATED) ORAL DAILY
Qty: 30 TABLET | Refills: 11 | Status: SHIPPED | OUTPATIENT
Start: 2019-04-19 | End: 2020-01-21

## 2019-04-22 DIAGNOSIS — R97.20 ELEVATED PROSTATE SPECIFIC ANTIGEN (PSA): Primary | ICD-10-CM

## 2019-04-23 ENCOUNTER — THERAPY VISIT (OUTPATIENT)
Dept: SPEECH THERAPY | Facility: CLINIC | Age: 64
End: 2019-04-23
Payer: COMMERCIAL

## 2019-04-23 DIAGNOSIS — R13.12 OROPHARYNGEAL DYSPHAGIA: Primary | ICD-10-CM

## 2019-04-23 NOTE — TELEPHONE ENCOUNTER
FUTURE VISIT INFORMATION      FUTURE VISIT INFORMATION:    Date: 5/13/19    Time: 3:00 pm    Location: [facility]  REFERRAL INFORMATION:    Referring provider:  Dr. Hull    Referring providers clinic:   [facility]    Reason for visit/diagnosis  Evaluation for nasal polyps    RECORDS REQUESTED FROM:       Clinic name Comments Records Status Imaging Status    [facility] Office Visit-3/14/19-Dr. Hull Epic

## 2019-04-26 ENCOUNTER — ANCILLARY PROCEDURE (OUTPATIENT)
Dept: MRI IMAGING | Facility: CLINIC | Age: 64
End: 2019-04-26
Attending: UROLOGY
Payer: COMMERCIAL

## 2019-04-26 DIAGNOSIS — R97.20 ELEVATED PROSTATE SPECIFIC ANTIGEN (PSA): ICD-10-CM

## 2019-04-26 DIAGNOSIS — Z94.4 LIVER REPLACED BY TRANSPLANT (H): ICD-10-CM

## 2019-04-26 LAB
ALBUMIN SERPL-MCNC: 3.8 G/DL (ref 3.4–5)
ALP SERPL-CCNC: 177 U/L (ref 40–150)
ALT SERPL W P-5'-P-CCNC: 20 U/L (ref 0–70)
ANION GAP SERPL CALCULATED.3IONS-SCNC: 6 MMOL/L (ref 3–14)
AST SERPL W P-5'-P-CCNC: 12 U/L (ref 0–45)
BILIRUB DIRECT SERPL-MCNC: 0.2 MG/DL (ref 0–0.2)
BILIRUB SERPL-MCNC: 0.7 MG/DL (ref 0.2–1.3)
BUN SERPL-MCNC: 20 MG/DL (ref 7–30)
CALCIUM SERPL-MCNC: 9.1 MG/DL (ref 8.5–10.1)
CHLORIDE SERPL-SCNC: 108 MMOL/L (ref 94–109)
CO2 SERPL-SCNC: 25 MMOL/L (ref 20–32)
CREAT SERPL-MCNC: 0.83 MG/DL (ref 0.66–1.25)
ERYTHROCYTE [DISTWIDTH] IN BLOOD BY AUTOMATED COUNT: 12.4 % (ref 10–15)
GFR SERPL CREATININE-BSD FRML MDRD: >90 ML/MIN/{1.73_M2}
GLUCOSE SERPL-MCNC: 146 MG/DL (ref 70–99)
HCT VFR BLD AUTO: 35.7 % (ref 40–53)
HGB BLD-MCNC: 11.8 G/DL (ref 13.3–17.7)
MAGNESIUM SERPL-MCNC: 1.9 MG/DL (ref 1.6–2.3)
MCH RBC QN AUTO: 28.4 PG (ref 26.5–33)
MCHC RBC AUTO-ENTMCNC: 33.1 G/DL (ref 31.5–36.5)
MCV RBC AUTO: 86 FL (ref 78–100)
PHOSPHATE SERPL-MCNC: 4.1 MG/DL (ref 2.5–4.5)
PLATELET # BLD AUTO: 126 10E9/L (ref 150–450)
POTASSIUM SERPL-SCNC: 5.4 MMOL/L (ref 3.4–5.3)
PROT SERPL-MCNC: 7 G/DL (ref 6.8–8.8)
PSA SERPL-MCNC: 5.51 UG/L (ref 0–4)
RBC # BLD AUTO: 4.16 10E12/L (ref 4.4–5.9)
SODIUM SERPL-SCNC: 138 MMOL/L (ref 133–144)
TACROLIMUS BLD-MCNC: 12.3 UG/L (ref 5–15)
TME LAST DOSE: NORMAL H
WBC # BLD AUTO: 4.4 10E9/L (ref 4–11)

## 2019-04-26 RX ORDER — GADOBUTROL 604.72 MG/ML
7.5 INJECTION INTRAVENOUS ONCE
Status: COMPLETED | OUTPATIENT
Start: 2019-04-26 | End: 2019-04-26

## 2019-04-26 RX ADMIN — GADOBUTROL 7.5 ML: 604.72 INJECTION INTRAVENOUS at 13:42

## 2019-04-26 NOTE — TELEPHONE ENCOUNTER
FUTURE VISIT INFORMATION      FUTURE VISIT INFORMATION:    Date: 5/14/19    Time: 9:00am    [place] [facility]  REFERRAL INFORMATION:    Referring provider:  Dr. Kevin Hull    Referring providers clinic:  [facility]    Reason for visit/diagnosis:  Chronic Hoarseness    RECORDS REQUESTED FROM:       Clinic name Comments Records Status Imaging Status   [facility] Office Visit - 3/14/19 - Dr. Hull [facility] Speech Therapy Visit - 4/23/19 - JED Cunningham [facility] Gastro Office Visit - 3/19/19 - Dr. Carballo [facility] Pulmonology Office Visit - 8/14/19 - Dr. Zazueta [facility] Imaging  XR Video Swallow - 4/2/19 EPIC PACS   PFT PFT - 6/18/18 [facility]    [facility] EGD - 4/4/18  Care Everywhere

## 2019-04-26 NOTE — DISCHARGE INSTRUCTIONS
MRI Contrast Discharge Instructions    The IV contrast you received today will pass out of your body in your  urine. This will happen in the next 24 hours. You will not feel this process.  Your urine will not change color.    Drink at least 4 extra glasses of water or juice today (unless your doctor  has restricted your fluids). This reduces the stress on your kidneys.  You may take your regular medicines.    If you are on dialysis: It is best to have dialysis today.    If you have a reaction: Most reactions happen right away. If you have  any new symptoms after leaving the hospital (such as hives or swelling),  call your hospital at the correct number below. Or call your family doctor.  If you have breathing distress or wheezing, call 911.    Special instructions: ***    I have read and understand the above information.    Signature:______________________________________ Date:___________    Staff:__________________________________________ Date:___________     Time:__________    [place] [facility] Departments:    ___[place]: [phone]  ___[facility]: [phone]  ___[place]: [phone] ___[facility]: [phone]  ___[facility]: [phone]  ___[facility]: [phone]  ___[phone]  ___[facility]: [phone]  ___[phone]

## 2019-04-28 ENCOUNTER — ANESTHESIA EVENT (OUTPATIENT)
Dept: SURGERY | Facility: CLINIC | Age: 64
End: 2019-04-28
Payer: COMMERCIAL

## 2019-04-28 ASSESSMENT — ENCOUNTER SYMPTOMS: DYSRHYTHMIAS: 1

## 2019-04-28 NOTE — ANESTHESIA PREPROCEDURE EVALUATION
Anesthesia Pre-Procedure Evaluation    Patient: Loy Limon   MRN:     [id number] Gender:   male   Age:    63 year old :      1955        Preoperative Diagnosis: Bile Duct Stenosis   Procedure(s):  Endoscopic Retrograde Cholangiopancreatogram     Past Medical History:   Diagnosis Date     Biliary stricture of transplanted liver (H) 2018     Cancer (H)     hepatocellualr carcinoma     Cirrhosis of liver (H) 2018     Enlarged prostate      Inguinal hernia     Repaired with mesh on 18     Liver lesion 2018     Liver transplanted (H) 2018     donor liver transplant     Portal vein thrombosis     on path explant     Postoperative atrial fibrillation (H) 2018     Pre-diabetes 2018      Past Surgical History:   Procedure Laterality Date     APPENDECTOMY       COLONOSCOPY           ENDOSCOPIC RETROGRADE CHOLANGIOPANCREATOGRAM N/A 2018    Procedure: ENDOSCOPIC RETROGRADE CHOLANGIOPANCREATOGRAM, with Billary Sphincterotomy and Billary Stent Placement;  Surgeon: Omero Lawler MD;  [facility]     ENDOSCOPIC RETROGRADE CHOLANGIOPANCREATOGRAM  2019    Procedure: COMBINED ENDOSCOPIC RETROGRADE CHOLANGIOPANCREATOGRAPHY, Bile Duct Stent Exchange;  Surgeon: Omero Lawler MD;  [facility]     HERNIORRHAPHY INGUINAL  2018    Procedure: Herniorrhaphy inguinal;  Surgeon: Emil Echevarria MD;  [facility]     IR LIVER BIOPSY PERCUTANEOUS  2018     TRANSPLANT LIVER RECIPIENT  DONOR N/A 2018    Procedure: TRANSPLANT LIVER RECIPIENT  DONOR;  Surgeon: Emil Echevarria MD;  [facility]          Anesthesia Evaluation     . Pt has had prior anesthetic. Type: General    No history of anesthetic complications          ROS/MED HX    ENT/Pulmonary:  - neg pulmonary ROS     Neurologic:  - neg neurologic ROS     Cardiovascular:     (+) hypertension----. : . . . :. dysrhythmias (post-op) a-fib and  a-flutter, . Previous cardiac testing Echodate:12/25/18results:Global and regional left ventricular function is normal with an EF of 60-65%.Right ventricular function, chamber size, wall motion, and thickness are  normal.Severe left atrial enlargement is present.  The inferior vena cava is normal.  No pericardial effusion is present.date: results:ECG reviewed date:12/25/18 + 2/18/19 results:2/18/19 SB at 47. NSST    12/25/18: Atrial fibrillation with rapid ventricular response=138  ST & T wave abnormality, consider anterolateral ischemia  When compared with ECG of 24-DEC-2018 23:59,  Atrial fibrillation has replaced Atrial flutter  T wave inversion more evident in Anterolateral leads date: results:          METS/Exercise Tolerance:  >4 METS   Hematologic:     (+) Anemia, -     (-) History of Transfusion   Musculoskeletal:  - neg musculoskeletal ROS       GI/Hepatic: Comment: S/p liver transplant on 12/22/2018.     (+) liver disease,       Renal/Genitourinary:     (+) BPH,       Endo:     (+) type II DM (pre-diabetes  STP=389) Last HgA1c: 8.6 date: 4/10/19 Not using insulin .      Psychiatric:  - neg psychiatric ROS       Infectious Disease:  - neg infectious disease ROS       Malignancy:   (+) Malignancy (hepatocellular CA)           Other: Comment: immunosuppressed   - neg other ROS                     PHYSICAL EXAM:   Mental Status/Neuro: A/A/O   Airway: Facies: Feasible  Mallampati: II  Mouth/Opening: Full  TM distance: > 6 cm  Neck ROM: Full   Respiratory:    CV:    Comments:      Dental: Normal                  Lab Results   Component Value Date    WBC 4.4 04/26/2019    HGB 11.8 (L) 04/26/2019    HCT 35.7 (L) 04/26/2019     (L) 04/26/2019     04/26/2019    POTASSIUM 5.4 (H) 04/26/2019    CHLORIDE 108 04/26/2019    CO2 25 04/26/2019    BUN 20 04/26/2019    CR 0.83 04/26/2019     (H) 04/26/2019    YELENA 9.1 04/26/2019    PHOS 4.1 04/26/2019    MAG 1.9 04/26/2019    ALBUMIN 3.8 04/26/2019     "PROTTOTAL 7.0 04/26/2019    ALT 20 04/26/2019    AST 12 04/26/2019    ALKPHOS 177 (H) 04/26/2019    BILITOTAL 0.7 04/26/2019    LIPASE 42 (L) 03/04/2019    AMYLASE 44 03/04/2019    PTT 40 (H) 12/22/2018    INR 1.14 12/31/2018    FIBR 220 12/23/2018    TSH 6.36 (H) 07/19/2018    T4 1.04 07/19/2018       Preop Vitals  BP Readings from Last 3 Encounters:   04/10/19 106/60   03/19/19 153/68   03/15/19 118/66    Pulse Readings from Last 3 Encounters:   04/10/19 50   03/19/19 51   03/15/19 50      Resp Readings from Last 3 Encounters:   02/18/19 16   02/08/19 20   02/04/19 18    SpO2 Readings from Last 3 Encounters:   04/10/19 99%   03/19/19 98%   03/15/19 97%      Temp Readings from Last 1 Encounters:   03/19/19 36.6  C (97.9  F) (Oral)    Ht Readings from Last 1 Encounters:   03/19/19 1.702 m (5' 7\")      Wt Readings from Last 1 Encounters:   04/10/19 78 kg (172 lb)    Estimated body mass index is 26.94 kg/m  as calculated from the following:    Height as of 3/19/19: 1.702 m (5' 7\").    Weight as of 4/10/19: 78 kg (172 lb).     LDA:  Pulmonary Artery Catheter Assessment - Single Lumen 12/22/18 0908 (Active)   Number of days: 127            Assessment:   ASA SCORE: 3    NPO Status: > 6 hours since completed Solid Foods   Documentation: H&P complete; Preop Testing complete; Consents complete   Proceeding: Proceed without further delay  Tobacco Use:  NO Active use of Tobacco/UNKNOWN Tobacco use status     Plan:   Anes. Type:  General      Induction:  IV (Standard)   Airway: Oral ETT   Access/Monitoring: PIV   Maintenance: Balanced   Emergence: Procedure Site   Logistics: Same Day Surgery     Postop Pain/Sedation Strategy:  Standard-Options: Opioids PRN     PONV Management:  Adult Risk Factors:, Non-Smoker, Postop Opioids  Prevention: Ondansetron       Comments for Plan/Consent:  02/18/19; Mask Ventilation: Easy; Intubation: Easy; 8 at 23 cm; Brown 2; Attempts: 1; Grade View of Cords: 1                             Patito Bucio" MD Shireen

## 2019-04-29 ENCOUNTER — ANESTHESIA (OUTPATIENT)
Dept: SURGERY | Facility: CLINIC | Age: 64
End: 2019-04-29
Payer: COMMERCIAL

## 2019-04-29 ENCOUNTER — APPOINTMENT (OUTPATIENT)
Dept: GENERAL RADIOLOGY | Facility: CLINIC | Age: 64
End: 2019-04-29
Attending: INTERNAL MEDICINE
Payer: COMMERCIAL

## 2019-04-29 ENCOUNTER — HOSPITAL ENCOUNTER (OUTPATIENT)
Facility: CLINIC | Age: 64
Discharge: HOME | End: 2019-04-29
Attending: INTERNAL MEDICINE | Admitting: INTERNAL MEDICINE
Payer: COMMERCIAL

## 2019-04-29 VITALS
SYSTOLIC BLOOD PRESSURE: 151 MMHG | HEART RATE: 47 BPM | TEMPERATURE: 97.3 F | DIASTOLIC BLOOD PRESSURE: 82 MMHG | RESPIRATION RATE: 18 BRPM | BODY MASS INDEX: 26.47 KG/M2 | WEIGHT: 168.65 LBS | HEIGHT: 67 IN | OXYGEN SATURATION: 100 %

## 2019-04-29 DIAGNOSIS — K83.1 BILIARY STRICTURE OF TRANSPLANTED LIVER (H): Primary | ICD-10-CM

## 2019-04-29 DIAGNOSIS — T86.49 BILIARY STRICTURE OF TRANSPLANTED LIVER (H): Primary | ICD-10-CM

## 2019-04-29 LAB
ERCP: NORMAL
GLUCOSE BLDC GLUCOMTR-MCNC: 173 MG/DL (ref 70–99)

## 2019-04-29 PROCEDURE — 40000171 ZZH STATISTIC PRE-PROCEDURE ASSESSMENT III: Performed by: INTERNAL MEDICINE

## 2019-04-29 PROCEDURE — 25000128 H RX IP 250 OP 636: Performed by: NURSE ANESTHETIST, CERTIFIED REGISTERED

## 2019-04-29 PROCEDURE — 71000027 ZZH RECOVERY PHASE 2 EACH 15 MINS: Performed by: INTERNAL MEDICINE

## 2019-04-29 PROCEDURE — 27210794 ZZH OR GENERAL SUPPLY STERILE: Performed by: INTERNAL MEDICINE

## 2019-04-29 PROCEDURE — 37000009 ZZH ANESTHESIA TECHNICAL FEE, EACH ADDTL 15 MIN: Performed by: INTERNAL MEDICINE

## 2019-04-29 PROCEDURE — 37000008 ZZH ANESTHESIA TECHNICAL FEE, 1ST 30 MIN: Performed by: INTERNAL MEDICINE

## 2019-04-29 PROCEDURE — 25000566 ZZH SEVOFLURANE, EA 15 MIN: Performed by: INTERNAL MEDICINE

## 2019-04-29 PROCEDURE — C1769 GUIDE WIRE: HCPCS | Performed by: INTERNAL MEDICINE

## 2019-04-29 PROCEDURE — 40000277 XR SURGERY CARM FLUORO LESS THAN 5 MIN W STILLS: Mod: TC

## 2019-04-29 PROCEDURE — 82962 GLUCOSE BLOOD TEST: CPT

## 2019-04-29 PROCEDURE — 25000125 ZZHC RX 250: Performed by: INTERNAL MEDICINE

## 2019-04-29 PROCEDURE — 71000014 ZZH RECOVERY PHASE 1 LEVEL 2 FIRST HR: Performed by: INTERNAL MEDICINE

## 2019-04-29 PROCEDURE — 83690 ASSAY OF LIPASE: CPT | Performed by: INTERNAL MEDICINE

## 2019-04-29 PROCEDURE — C1726 CATH, BAL DIL, NON-VASCULAR: HCPCS | Performed by: INTERNAL MEDICINE

## 2019-04-29 PROCEDURE — 25800030 ZZH RX IP 258 OP 636: Performed by: NURSE ANESTHETIST, CERTIFIED REGISTERED

## 2019-04-29 PROCEDURE — 25000125 ZZHC RX 250: Performed by: NURSE ANESTHETIST, CERTIFIED REGISTERED

## 2019-04-29 PROCEDURE — 36000053 ZZH SURGERY LEVEL 2 EA 15 ADDTL MIN - UMMC: Performed by: INTERNAL MEDICINE

## 2019-04-29 PROCEDURE — 25500064 ZZH RX 255 OP 636: Performed by: INTERNAL MEDICINE

## 2019-04-29 PROCEDURE — 36000055 ZZH SURGERY LEVEL 2 W FLUORO 1ST 30 MIN - UMMC: Performed by: INTERNAL MEDICINE

## 2019-04-29 RX ORDER — DEXAMETHASONE SODIUM PHOSPHATE 10 MG/ML
INJECTION, SOLUTION INTRAMUSCULAR; INTRAVENOUS PRN
Status: DISCONTINUED | OUTPATIENT
Start: 2019-04-29 | End: 2019-04-29

## 2019-04-29 RX ORDER — ONDANSETRON 4 MG/1
4 TABLET, ORALLY DISINTEGRATING ORAL EVERY 30 MIN PRN
Status: DISCONTINUED | OUTPATIENT
Start: 2019-04-29 | End: 2019-04-29

## 2019-04-29 RX ORDER — FLUMAZENIL 0.1 MG/ML
0.2 INJECTION, SOLUTION INTRAVENOUS
Status: CANCELLED | OUTPATIENT
Start: 2019-04-29 | End: 2019-04-30

## 2019-04-29 RX ORDER — HYDRALAZINE HYDROCHLORIDE 20 MG/ML
2.5-5 INJECTION INTRAMUSCULAR; INTRAVENOUS EVERY 10 MIN PRN
Status: DISCONTINUED | OUTPATIENT
Start: 2019-04-29 | End: 2019-04-29 | Stop reason: HOSPADM

## 2019-04-29 RX ORDER — SODIUM CHLORIDE, SODIUM LACTATE, POTASSIUM CHLORIDE, CALCIUM CHLORIDE 600; 310; 30; 20 MG/100ML; MG/100ML; MG/100ML; MG/100ML
INJECTION, SOLUTION INTRAVENOUS CONTINUOUS
Status: DISCONTINUED | OUTPATIENT
Start: 2019-04-29 | End: 2019-04-29

## 2019-04-29 RX ORDER — ONDANSETRON 2 MG/ML
4 INJECTION INTRAMUSCULAR; INTRAVENOUS EVERY 30 MIN PRN
Status: DISCONTINUED | OUTPATIENT
Start: 2019-04-29 | End: 2019-04-29

## 2019-04-29 RX ORDER — FENTANYL CITRATE 50 UG/ML
25-50 INJECTION, SOLUTION INTRAMUSCULAR; INTRAVENOUS
Status: DISCONTINUED | OUTPATIENT
Start: 2019-04-29 | End: 2019-04-29 | Stop reason: HOSPADM

## 2019-04-29 RX ORDER — NALOXONE HYDROCHLORIDE 0.4 MG/ML
.1-.4 INJECTION, SOLUTION INTRAMUSCULAR; INTRAVENOUS; SUBCUTANEOUS
Status: DISCONTINUED | OUTPATIENT
Start: 2019-04-29 | End: 2019-04-29 | Stop reason: HOSPADM

## 2019-04-29 RX ORDER — CIPROFLOXACIN 500 MG/1
500 TABLET, FILM COATED ORAL 2 TIMES DAILY
Qty: 10 TABLET | Refills: 0 | Status: SHIPPED | OUTPATIENT
Start: 2019-04-29 | End: 2019-05-13

## 2019-04-29 RX ORDER — MEPERIDINE HYDROCHLORIDE 25 MG/ML
12.5 INJECTION INTRAMUSCULAR; INTRAVENOUS; SUBCUTANEOUS
Status: DISCONTINUED | OUTPATIENT
Start: 2019-04-29 | End: 2019-04-29

## 2019-04-29 RX ORDER — PROPOFOL 10 MG/ML
INJECTION, EMULSION INTRAVENOUS PRN
Status: DISCONTINUED | OUTPATIENT
Start: 2019-04-29 | End: 2019-04-29

## 2019-04-29 RX ORDER — ONDANSETRON 4 MG/1
4 TABLET, ORALLY DISINTEGRATING ORAL EVERY 30 MIN PRN
Status: DISCONTINUED | OUTPATIENT
Start: 2019-04-29 | End: 2019-04-29 | Stop reason: HOSPADM

## 2019-04-29 RX ORDER — MEPERIDINE HYDROCHLORIDE 25 MG/ML
12.5 INJECTION INTRAMUSCULAR; INTRAVENOUS; SUBCUTANEOUS
Status: DISCONTINUED | OUTPATIENT
Start: 2019-04-29 | End: 2019-04-29 | Stop reason: HOSPADM

## 2019-04-29 RX ORDER — METOPROLOL TARTRATE 1 MG/ML
1-2 INJECTION, SOLUTION INTRAVENOUS EVERY 5 MIN PRN
Status: DISCONTINUED | OUTPATIENT
Start: 2019-04-29 | End: 2019-04-29 | Stop reason: HOSPADM

## 2019-04-29 RX ORDER — ONDANSETRON 2 MG/ML
INJECTION INTRAMUSCULAR; INTRAVENOUS PRN
Status: DISCONTINUED | OUTPATIENT
Start: 2019-04-29 | End: 2019-04-29

## 2019-04-29 RX ORDER — HYDRALAZINE HYDROCHLORIDE 20 MG/ML
2.5-5 INJECTION INTRAMUSCULAR; INTRAVENOUS EVERY 10 MIN PRN
Status: DISCONTINUED | OUTPATIENT
Start: 2019-04-29 | End: 2019-04-29

## 2019-04-29 RX ORDER — FENTANYL CITRATE 50 UG/ML
25-50 INJECTION, SOLUTION INTRAMUSCULAR; INTRAVENOUS
Status: DISCONTINUED | OUTPATIENT
Start: 2019-04-29 | End: 2019-04-29

## 2019-04-29 RX ORDER — DIMENHYDRINATE 50 MG/ML
25 INJECTION, SOLUTION INTRAMUSCULAR; INTRAVENOUS
Status: DISCONTINUED | OUTPATIENT
Start: 2019-04-29 | End: 2019-04-29 | Stop reason: HOSPADM

## 2019-04-29 RX ORDER — NALOXONE HYDROCHLORIDE 0.4 MG/ML
.1-.4 INJECTION, SOLUTION INTRAMUSCULAR; INTRAVENOUS; SUBCUTANEOUS
Status: DISCONTINUED | OUTPATIENT
Start: 2019-04-29 | End: 2019-04-29

## 2019-04-29 RX ORDER — LIDOCAINE 40 MG/G
CREAM TOPICAL
Status: DISCONTINUED | OUTPATIENT
Start: 2019-04-29 | End: 2019-04-29 | Stop reason: HOSPADM

## 2019-04-29 RX ORDER — NALOXONE HYDROCHLORIDE 0.4 MG/ML
.1-.4 INJECTION, SOLUTION INTRAMUSCULAR; INTRAVENOUS; SUBCUTANEOUS
Status: CANCELLED | OUTPATIENT
Start: 2019-04-29 | End: 2019-04-30

## 2019-04-29 RX ORDER — SODIUM CHLORIDE, SODIUM LACTATE, POTASSIUM CHLORIDE, CALCIUM CHLORIDE 600; 310; 30; 20 MG/100ML; MG/100ML; MG/100ML; MG/100ML
INJECTION, SOLUTION INTRAVENOUS CONTINUOUS
Status: DISCONTINUED | OUTPATIENT
Start: 2019-04-29 | End: 2019-04-29 | Stop reason: HOSPADM

## 2019-04-29 RX ORDER — ALBUTEROL SULFATE 0.83 MG/ML
2.5 SOLUTION RESPIRATORY (INHALATION) EVERY 4 HOURS PRN
Status: DISCONTINUED | OUTPATIENT
Start: 2019-04-29 | End: 2019-04-29

## 2019-04-29 RX ORDER — HYDROMORPHONE HYDROCHLORIDE 1 MG/ML
.3-.5 INJECTION, SOLUTION INTRAMUSCULAR; INTRAVENOUS; SUBCUTANEOUS EVERY 10 MIN PRN
Status: DISCONTINUED | OUTPATIENT
Start: 2019-04-29 | End: 2019-04-29

## 2019-04-29 RX ORDER — HYDROMORPHONE HYDROCHLORIDE 1 MG/ML
.3-.5 INJECTION, SOLUTION INTRAMUSCULAR; INTRAVENOUS; SUBCUTANEOUS EVERY 10 MIN PRN
Status: DISCONTINUED | OUTPATIENT
Start: 2019-04-29 | End: 2019-04-29 | Stop reason: HOSPADM

## 2019-04-29 RX ORDER — METOPROLOL TARTRATE 1 MG/ML
1-2 INJECTION, SOLUTION INTRAVENOUS EVERY 5 MIN PRN
Status: DISCONTINUED | OUTPATIENT
Start: 2019-04-29 | End: 2019-04-29

## 2019-04-29 RX ORDER — ALBUTEROL SULFATE 0.83 MG/ML
2.5 SOLUTION RESPIRATORY (INHALATION) EVERY 4 HOURS PRN
Status: DISCONTINUED | OUTPATIENT
Start: 2019-04-29 | End: 2019-04-29 | Stop reason: HOSPADM

## 2019-04-29 RX ORDER — DIMENHYDRINATE 50 MG/ML
25 INJECTION, SOLUTION INTRAMUSCULAR; INTRAVENOUS
Status: DISCONTINUED | OUTPATIENT
Start: 2019-04-29 | End: 2019-04-29

## 2019-04-29 RX ORDER — ONDANSETRON 2 MG/ML
4 INJECTION INTRAMUSCULAR; INTRAVENOUS EVERY 30 MIN PRN
Status: DISCONTINUED | OUTPATIENT
Start: 2019-04-29 | End: 2019-04-29 | Stop reason: HOSPADM

## 2019-04-29 RX ORDER — INDOMETHACIN 50 MG/1
100 SUPPOSITORY RECTAL
Status: COMPLETED | OUTPATIENT
Start: 2019-04-29 | End: 2019-04-29

## 2019-04-29 RX ORDER — SODIUM CHLORIDE, SODIUM LACTATE, POTASSIUM CHLORIDE, CALCIUM CHLORIDE 600; 310; 30; 20 MG/100ML; MG/100ML; MG/100ML; MG/100ML
INJECTION, SOLUTION INTRAVENOUS CONTINUOUS PRN
Status: DISCONTINUED | OUTPATIENT
Start: 2019-04-29 | End: 2019-04-29

## 2019-04-29 RX ORDER — IOPAMIDOL 510 MG/ML
INJECTION, SOLUTION INTRAVASCULAR PRN
Status: DISCONTINUED | OUTPATIENT
Start: 2019-04-29 | End: 2019-04-29 | Stop reason: HOSPADM

## 2019-04-29 RX ORDER — FENTANYL CITRATE 50 UG/ML
INJECTION, SOLUTION INTRAMUSCULAR; INTRAVENOUS PRN
Status: DISCONTINUED | OUTPATIENT
Start: 2019-04-29 | End: 2019-04-29

## 2019-04-29 RX ORDER — LIDOCAINE HYDROCHLORIDE 20 MG/ML
INJECTION, SOLUTION INFILTRATION; PERINEURAL PRN
Status: DISCONTINUED | OUTPATIENT
Start: 2019-04-29 | End: 2019-04-29

## 2019-04-29 RX ADMIN — LIDOCAINE HYDROCHLORIDE 80 MG: 20 INJECTION, SOLUTION INFILTRATION; PERINEURAL at 13:06

## 2019-04-29 RX ADMIN — PROPOFOL 170 MG: 10 INJECTION, EMULSION INTRAVENOUS at 13:06

## 2019-04-29 RX ADMIN — SUGAMMADEX 200 MG: 100 INJECTION, SOLUTION INTRAVENOUS at 13:47

## 2019-04-29 RX ADMIN — DEXAMETHASONE SODIUM PHOSPHATE 4 MG: 10 INJECTION, SOLUTION INTRAMUSCULAR; INTRAVENOUS at 13:44

## 2019-04-29 RX ADMIN — ROCURONIUM BROMIDE 40 MG: 10 INJECTION INTRAVENOUS at 13:06

## 2019-04-29 RX ADMIN — FENTANYL CITRATE 25 MCG: 50 INJECTION, SOLUTION INTRAMUSCULAR; INTRAVENOUS at 13:06

## 2019-04-29 RX ADMIN — SODIUM CHLORIDE, POTASSIUM CHLORIDE, SODIUM LACTATE AND CALCIUM CHLORIDE: 600; 310; 30; 20 INJECTION, SOLUTION INTRAVENOUS at 12:59

## 2019-04-29 RX ADMIN — ONDANSETRON 4 MG: 2 INJECTION INTRAMUSCULAR; INTRAVENOUS at 13:50

## 2019-04-29 RX ADMIN — MIDAZOLAM 1 MG: 1 INJECTION INTRAMUSCULAR; INTRAVENOUS at 12:59

## 2019-04-29 ASSESSMENT — MIFFLIN-ST. JEOR: SCORE: 1508.63

## 2019-04-29 NOTE — ANESTHESIA CARE TRANSFER NOTE
Patient: Loy Limon    Procedure(s):  ENDOSCOPIC RETROGRADE CHOLANGIOPANCREATOGRAPHY, WITH BILIARY STENT REMOVAL AND STONE EXTRACTION    Diagnosis: Encounter For Removal Of Pancreatic Stent  Diagnosis Additional Information: No value filed.    Anesthesia Type:   No value filed.     Note:  Airway :Face Mask  Patient transferred to:PACU  Comments:   -on 6L O2 via FM, Pt Spont.  breathing, awake & alert, monitors placed, VSS, RN at bedside, no airway      Vitals: (Last set prior to Anesthesia Care Transfer)    CRNA VITALS  4/29/2019 1337 - 4/29/2019 1413      4/29/2019             Resp Rate (observed):  1  (Abnormal)                 Electronically Signed By: EZRA Lee CRNA  April 29, 2019  2:13 PM

## 2019-04-29 NOTE — DISCHARGE INSTRUCTIONS
[facility]  Same-Day Surgery   Adult Discharge Orders & Instructions     For 24 hours after surgery    1. Get plenty of rest.  A responsible adult must stay with you for at least 24 hours after you leave the hospital.   2. Do not drive or use heavy equipment.  If you have weakness or tingling, don't drive or use heavy equipment until this feeling goes away.  3. Do not drink alcohol.  4. Avoid strenuous or risky activities.  Ask for help when climbing stairs.   5. You may feel lightheaded.  IF so, sit for a few minutes before standing.  Have someone help you get up.   6. If you have nausea (feel sick to your stomach): Drink only clear liquids such as apple juice, ginger ale, broth or 7-Up.  Rest may also help.  Be sure to drink enough fluids.  Move to a regular diet as you feel able.  7. You may have a slight fever. Call the doctor if your fever is over 100 F (37.7 C) (taken under the tongue) or lasts longer than 24 hours.  8. You may have a dry mouth, a sore throat, muscle aches or trouble sleeping.  These should go away after 24 hours.  9. Do not make important or legal decisions.   Call your doctor for any of the followin.  Signs of infection (fever, growing tenderness at the surgery site, a large amount of drainage or bleeding, severe pain, foul-smelling drainage, redness, swelling).    2. It has been over 8 to 10 hours since surgery and you are still not able to urinate (pass water).    3.  Headache for over 24 hours.      To contact a doctor, call Dr. Lawler at [phone]    or:        [phone] and ask for the resident on call for   Dr. Lawler  (answered 24 hours a day)      Emergency Department:    [facility]: [phone]       (TTY for hearing impaired: [phone])

## 2019-04-29 NOTE — ANESTHESIA POSTPROCEDURE EVALUATION
Anesthesia POST Procedure Evaluation    Patient: Loy Limon   MRN:     [id number] Gender:   male   Age:    63 year old :      1955        Preoperative Diagnosis: Encounter For Removal Of Pancreatic Stent   Procedure(s):  ENDOSCOPIC RETROGRADE CHOLANGIOPANCREATOGRAPHY, WITH BILIARY STENT REMOVAL AND STONE EXTRACTION   Postop Comments: No value filed.       Anesthesia Type:  General  No value filed.    Reportable Event: NO     PAIN: Uncomplicated   Sign Out status: Comfortable, Well controlled pain     PONV: No PONV   Sign Out status:  No Nausea or Vomiting     Neuro/Psych: Uneventful perioperative course   Sign Out Status: Preoperative baseline; Age appropriate mentation     Airway/Resp.: Uneventful perioperative course   Sign Out Status: Non labored breathing, age appropriate RR; Resp. Status within EXPECTED Parameters     CV: Uneventful perioperative course   Sign Out status: Appropriate BP and perfusion indices; Appropriate HR/Rhythm     Disposition:   Sign Out in:  PACU  Disposition:  Phase II; Home  Recovery Course: Uneventful  Follow-Up: Not required           Last Anesthesia Record Vitals:  CRNA VITALS  2019 1337 - 2019 1437      2019             Resp Rate (observed):  1  (Abnormal)     EKG:  Sinus bradycardia          Last PACU Vitals:  Vitals Value Taken Time   /78 2019  3:00 PM   Temp 36.4  C (97.5  F) 2019  2:50 PM   Pulse 47 2019  3:00 PM   Resp 14 2019  2:50 PM   SpO2 99 % 2019  3:07 PM   Temp src     NIBP     Pulse     SpO2     Resp     Temp     Ht Rate     Temp 2     Vitals shown include unvalidated device data.      Electronically Signed By: Jj Reinoso MD, 2019, 3:09 PM

## 2019-05-01 ENCOUNTER — PRE VISIT (OUTPATIENT)
Dept: UROLOGY | Facility: CLINIC | Age: 64
End: 2019-05-01

## 2019-05-01 NOTE — TELEPHONE ENCOUNTER
Chief Complaint : Return-6 Mo     New Hx/Sx: Elevated PSA/BPH    Records/Orders/Proced: Available    Pt Contacted: N/A    At Rooming: Flow/PVR

## 2019-05-03 ENCOUNTER — CARE COORDINATION (OUTPATIENT)
Dept: GASTROENTEROLOGY | Facility: CLINIC | Age: 64
End: 2019-05-03

## 2019-05-03 ENCOUNTER — OFFICE VISIT (OUTPATIENT)
Dept: UROLOGY | Facility: CLINIC | Age: 64
End: 2019-05-03
Payer: COMMERCIAL

## 2019-05-03 ENCOUNTER — OFFICE VISIT (OUTPATIENT)
Dept: FAMILY MEDICINE | Facility: CLINIC | Age: 64
End: 2019-05-03
Payer: COMMERCIAL

## 2019-05-03 VITALS
RESPIRATION RATE: 18 BRPM | HEART RATE: 53 BPM | DIASTOLIC BLOOD PRESSURE: 58 MMHG | WEIGHT: 171.4 LBS | SYSTOLIC BLOOD PRESSURE: 109 MMHG | BODY MASS INDEX: 26.85 KG/M2 | TEMPERATURE: 98.8 F

## 2019-05-03 VITALS
HEART RATE: 53 BPM | SYSTOLIC BLOOD PRESSURE: 109 MMHG | WEIGHT: 171.4 LBS | BODY MASS INDEX: 26.85 KG/M2 | DIASTOLIC BLOOD PRESSURE: 58 MMHG

## 2019-05-03 DIAGNOSIS — R97.20 ELEVATED PROSTATE SPECIFIC ANTIGEN (PSA): Primary | ICD-10-CM

## 2019-05-03 DIAGNOSIS — E11.9 DM TYPE 2, GOAL HBA1C 7%-8% (H): Primary | ICD-10-CM

## 2019-05-03 RX ORDER — METFORMIN HCL 500 MG
1000 TABLET, EXTENDED RELEASE 24 HR ORAL
Qty: 60 TABLET | Refills: 3 | Status: SHIPPED | OUTPATIENT
Start: 2019-05-03 | End: 2019-06-05

## 2019-05-03 RX ORDER — CIPROFLOXACIN 500 MG/1
500 TABLET, FILM COATED ORAL 2 TIMES DAILY
Qty: 6 TABLET | Refills: 0 | Status: SHIPPED | OUTPATIENT
Start: 2019-05-03 | End: 2019-06-05

## 2019-05-03 ASSESSMENT — PAIN SCALES - GENERAL: PAINLEVEL: NO PAIN (0)

## 2019-05-03 NOTE — PROGRESS NOTES
UROLOGIC DIAGNOSES:       CURRENT INTERVENTIONS:       HISTORY:       Patient presents for evaluation and management of BPH. Patient found to have enlarged prostate on  work up previously   Patient preparing for liver transplant and presents for clearance.      Nocturia 3x per night   Frequency up to 1.5-2 hours   Good stream of urine     Patient states that he is urinating q four hours at present. Nocturia is now 2x per night after starting afluzosin.     Patient continues to be pleased with his symptoms at present after starting alpha blocker.   Patient currently s/p liver transplant and doing very well.     Noted to have an elevated PSA on screening. Had repeat PSA that was similarly elevated.   MRI prostate showed a PIRADS 5 lesion.     We discussed the above. Discussed prostate biopsy prep and procedure. Discussed MRI fusion biopsy.        PAST MEDICAL HISTORY:   Past Medical History:   Diagnosis Date     Biliary stricture of transplanted liver (H) 2018     Cancer (H)     hepatocellualr carcinoma     Cirrhosis of liver (H) 2018     Enlarged prostate      Inguinal hernia     Repaired with mesh on 18     Liver lesion 2018     Liver transplanted (H) 2018     donor liver transplant     Portal vein thrombosis     on path explant     Postoperative atrial fibrillation (H) 2018     Pre-diabetes 2018       PAST SURGICAL HISTORY:   Past Surgical History:   Procedure Laterality Date     APPENDECTOMY       COLONOSCOPY      2018     ENDOSCOPIC RETROGRADE CHOLANGIOPANCREATOGRAM N/A 2018    Procedure: ENDOSCOPIC RETROGRADE CHOLANGIOPANCREATOGRAM, with Billary Sphincterotomy and Billary Stent Placement;  Surgeon: Omero Lawler MD;  [facility]     ENDOSCOPIC RETROGRADE CHOLANGIOPANCREATOGRAM  2019    Procedure: COMBINED ENDOSCOPIC RETROGRADE CHOLANGIOPANCREATOGRAPHY, Bile Duct Stent Exchange;  Surgeon: Omero Lawler MD;  [facility]      ENDOSCOPIC RETROGRADE CHOLANGIOPANCREATOGRAM N/A 2019    Procedure: ENDOSCOPIC RETROGRADE CHOLANGIOPANCREATOGRAPHY, WITH BILIARY STENT REMOVAL AND STONE EXTRACTION;  Surgeon: Omero Lawler MD;  [facility]     HERNIORRHAPHY INGUINAL  2018    Procedure: Herniorrhaphy inguinal;  Surgeon: Emil Echevarria MD;  [facility]     IR LIVER BIOPSY PERCUTANEOUS  2018     TRANSPLANT LIVER RECIPIENT  DONOR N/A 2018    Procedure: TRANSPLANT LIVER RECIPIENT  DONOR;  Surgeon: Emil Echevarria MD;  [facility]       FAMILY HISTORY:   Family History   Problem Relation Age of Onset     Liver Disease Brother      Skin Cancer No family hx of      Melanoma No family hx of        SOCIAL HISTORY:   Social History     Tobacco Use     Smoking status: Former Smoker     Packs/day: 0.03     Years: 20.00     Pack years: 0.60     Types: Cigarettes, Cigars     Start date: 1970     Last attempt to quit: 3/14/2018     Years since quittin.1     Smokeless tobacco: Never Used     Tobacco comment: Quit smoking 2018, one cigarette after dinner   Substance Use Topics     Alcohol use: No     Comment: Quit drinking 2018       Current Outpatient Medications   Medication     alfuzosin ER (UROXATRAL) 10 MG 24 hr tablet     amLODIPine (NORVASC) 10 MG tablet     aspirin (ASA) 325 MG EC tablet     blood glucose (NO BRAND SPECIFIED) lancets standard     blood glucose (ONETOUCH VERIO IQ) test strip     ciprofloxacin (CIPRO) 500 MG tablet     ciprofloxacin (CIPRO) 500 MG tablet     glucose 40 % (400 mg/mL) GEL gel     magnesium citrate solution     metFORMIN (GLUCOPHAGE-XR) 500 MG 24 hr tablet     metoprolol tartrate 75 MG TABS     multivitamin w/minerals (THERA-VIT-M) tablet     polyethylene glycol (MIRALAX/GLYCOLAX) packet     polyvinyl alcohol (LIQUIFILM TEARS) 1.4 % ophthalmic solution     Sharps Container MISC     sulfamethoxazole-trimethoprim (BACTRIM/SEPTRA) 400-80 MG  tablet     tacrolimus (GENERIC EQUIVALENT) 1 MG capsule     urea (GORMEL) 20 % external cream     valGANciclovir (VALCYTE) 450 MG tablet     No current facility-administered medications for this visit.          PHYSICAL EXAM:    /58   Pulse 53   Wt 77.7 kg (171 lb 6.4 oz)   BMI 26.85 kg/m      HEENT: Normocephalic and atraumatic   Cardiac: Not done  Back/Flank: Not done  CNS/PNS: Not done  Respiratory: Normal non-labored breathing  Abdomen: Soft nontender and nondistended  Peripheral Vascular: Not done  Mental Status: Not done    Penis: Not done  Scrotal Skin: Not done  Testicles: Not done  Epididymis: Not done  Digital Rectal Exam:     Cystoscopy: Not done    Imaging: None    Urinalysis: [facility] RESULTS:  Recent Labs   Lab Test  07/19/18   1133   COLOR  Yellow   APPEARANCE  Clear   URINEGLC  Negative   URINEBILI  Negative   URINEKETONE  Negative   SG  1.009   UBLD  Negative   URINEPH  8.0*   PROTEIN  Negative   NITRITE  Negative   LEUKEST  Negative       PSA:     Post Void Residual: 26ml    Other labs: None today      IMPRESSION:  64 y/o M with lower urinary tract symptoms now improved with alpha blocker now with elevated PSA and PIRADS 5 lesion on MRI    PLAN:  Schedule for MRI fusion prostate biopsy       Total Time: 15 minutes                                       Total in Consultation: greater than 50%

## 2019-05-03 NOTE — LETTER
5/3/2019     RE: Loy Limon  [address]  [facility] [address]     Dear Colleague,    Thank you for referring your patient, Loy Limon, to the [facility] UROLOGY AND INST FOR PROSTATE AND UROLOGIC CANCERS at [facility]. Please see a copy of my visit note below.    UROLOGIC DIAGNOSES:       CURRENT INTERVENTIONS:       HISTORY:       Patient presents for evaluation and management of BPH. Patient found to have enlarged prostate on  work up previously   Patient preparing for liver transplant and presents for clearance.      Nocturia 3x per night   Frequency up to 1.5-2 hours   Good stream of urine     Patient states that he is urinating q four hours at present. Nocturia is now 2x per night after starting afluzosin.     Patient continues to be pleased with his symptoms at present after starting alpha blocker.   Patient currently s/p liver transplant and doing very well.     Noted to have an elevated PSA on screening. Had repeat PSA that was similarly elevated.   MRI prostate showed a PIRADS 5 lesion.     We discussed the above. Discussed prostate biopsy prep and procedure. Discussed MRI fusion biopsy.        PAST MEDICAL HISTORY:   Past Medical History:   Diagnosis Date     Biliary stricture of transplanted liver (H) 2018     Cancer (H)     hepatocellualr carcinoma     Cirrhosis of liver (H) 2018     Enlarged prostate      Inguinal hernia     Repaired with mesh on 18     Liver lesion 2018     Liver transplanted (H) 2018     donor liver transplant     Portal vein thrombosis     on path explant     Postoperative atrial fibrillation (H) 2018     Pre-diabetes 2018       PAST SURGICAL HISTORY:   Past Surgical History:   Procedure Laterality Date     APPENDECTOMY       COLONOSCOPY      2018     ENDOSCOPIC RETROGRADE CHOLANGIOPANCREATOGRAM N/A 2018    Procedure: ENDOSCOPIC RETROGRADE CHOLANGIOPANCREATOGRAM, with  Billary Sphincterotomy and Billary Stent Placement;  Surgeon: Omero Lawler MD;  [facility]     ENDOSCOPIC RETROGRADE CHOLANGIOPANCREATOGRAM  2019    Procedure: COMBINED ENDOSCOPIC RETROGRADE CHOLANGIOPANCREATOGRAPHY, Bile Duct Stent Exchange;  Surgeon: Omero Lawler MD;  [facility]     ENDOSCOPIC RETROGRADE CHOLANGIOPANCREATOGRAM N/A 2019    Procedure: ENDOSCOPIC RETROGRADE CHOLANGIOPANCREATOGRAPHY, WITH BILIARY STENT REMOVAL AND STONE EXTRACTION;  Surgeon: Omero Lawler MD;  [facility]     HERNIORRHAPHY INGUINAL  2018    Procedure: Herniorrhaphy inguinal;  Surgeon: Emil Echevarria MD;  [facility]     IR LIVER BIOPSY PERCUTANEOUS  2018     TRANSPLANT LIVER RECIPIENT  DONOR N/A 2018    Procedure: TRANSPLANT LIVER RECIPIENT  DONOR;  Surgeon: Emil Echevarria MD;  [facility]       FAMILY HISTORY:   Family History   Problem Relation Age of Onset     Liver Disease Brother      Skin Cancer No family hx of      Melanoma No family hx of        SOCIAL HISTORY:   Social History     Tobacco Use     Smoking status: Former Smoker     Packs/day: 0.03     Years: 20.00     Pack years: 0.60     Types: Cigarettes, Cigars     Start date: 1970     Last attempt to quit: 3/14/2018     Years since quittin.1     Smokeless tobacco: Never Used     Tobacco comment: Quit smoking 2018, one cigarette after dinner   Substance Use Topics     Alcohol use: No     Comment: Quit drinking 2018       Current Outpatient Medications   Medication     alfuzosin ER (UROXATRAL) 10 MG 24 hr tablet     amLODIPine (NORVASC) 10 MG tablet     aspirin (ASA) 325 MG EC tablet     blood glucose (NO BRAND SPECIFIED) lancets standard     blood glucose (ONETOUCH VERIO IQ) test strip     ciprofloxacin (CIPRO) 500 MG tablet     ciprofloxacin (CIPRO) 500 MG tablet     glucose 40 % (400 mg/mL) GEL gel     magnesium citrate solution     metFORMIN  (GLUCOPHAGE-XR) 500 MG 24 hr tablet     metoprolol tartrate 75 MG TABS     multivitamin w/minerals (THERA-VIT-M) tablet     polyethylene glycol (MIRALAX/GLYCOLAX) packet     polyvinyl alcohol (LIQUIFILM TEARS) 1.4 % ophthalmic solution     Sharps Container MISC     sulfamethoxazole-trimethoprim (BACTRIM/SEPTRA) 400-80 MG tablet     tacrolimus (GENERIC EQUIVALENT) 1 MG capsule     urea (GORMEL) 20 % external cream     valGANciclovir (VALCYTE) 450 MG tablet     No current facility-administered medications for this visit.          PHYSICAL EXAM:    /58   Pulse 53   Wt 77.7 kg (171 lb 6.4 oz)   BMI 26.85 kg/m       HEENT: Normocephalic and atraumatic   Cardiac: Not done  Back/Flank: Not done  CNS/PNS: Not done  Respiratory: Normal non-labored breathing  Abdomen: Soft nontender and nondistended  Peripheral Vascular: Not done  Mental Status: Not done    Penis: Not done  Scrotal Skin: Not done  Testicles: Not done  Epididymis: Not done  Digital Rectal Exam:     Cystoscopy: Not done    Imaging: None    Urinalysis: [facility] RESULTS:  Recent Labs   Lab Test  07/19/18   1133   COLOR  Yellow   APPEARANCE  Clear   URINEGLC  Negative   URINEBILI  Negative   URINEKETONE  Negative   SG  1.009   UBLD  Negative   URINEPH  8.0*   PROTEIN  Negative   NITRITE  Negative   LEUKEST  Negative       PSA:     Post Void Residual: 26ml    Other labs: None today      IMPRESSION:  64 y/o M with lower urinary tract symptoms now improved with alpha blocker now with elevated PSA and PIRADS 5 lesion on MRI    PLAN:  Schedule for MRI fusion prostate biopsy     Total Time: 15 minutes                                       Total in Consultation: greater than 50%     Again, thank you for allowing me to participate in the care of your patient.      Sincerely,  Khloe Torres MD

## 2019-05-03 NOTE — NURSING NOTE
Chief Complaint   Patient presents with     RECHECK      Return-6 Mo flow pvr       Belkys Reilly MA

## 2019-05-03 NOTE — PROGRESS NOTES
Post ERCP (4/29/19) with Dr. Lawler: Follow-up    Post procedure recommendations: - Follow up with transplant hepatology as scheduled - No plans for further interventional endoscopy at this juncture    Orders placed: None at this time.     Message left to call with any questions/concerning symptoms.     [address] Dr. Lawler, & Dr. Araujo  Advanced Endoscopy  [phone]

## 2019-05-03 NOTE — PATIENT INSTRUCTIONS
[place] [facility]  MRI Fusion Prostate Bx Patient Instructions  What is MRI Fusion Prostate Bx?  The MRI fusion biopsy is used to target a specific area for sampling. It requires a needle guide to be inserted into the rectum next to the prostate. Next, MR images are viewed, the abnormal area is identified, and a needle is inserted through the guide to the targeted area for tissue samples.  How do I prepare for the exam?    Take Cipro 500mg one tablet in the morning and one in the evening starting the day prior to procedure x 3 days    You will be receiving a Gentamicin intramuscular injection in the Urology clinic 30 min prior to your procedure. Please plan to arrive 30 min earlier.      On the day of the procedure eat and drink normally. Please do not fast or skip meals on the day of your procedure.    You will need to purchase one Fleets enema (or equivalent).  This can be purchased at any pharmacy or grocery store and will be found in the laxative section. We ask that you give the enema to yourself approximately 2 hours before your appointment time.    If you are taking any anticoagulation medications such as Coumadin, Jantoven, Warfarin, Xarelto, Pradaxa, or Eliquis special instructions will need to be discussed.    Do not take any of the following medications 7 days before your procedure:  - Anacin  - Bufferin  - Excedrin  - Motrin  - Naprosyn  - Feldene  - Plavix  - Ibuprofen  - Ecotrin   - Any other aspirin based products.       How long will procedure take?  MRI fusion biopsy will take about 1 hr. Please allow 2 hrs for the whole procedure (including pre and post)  When will I know my results?  We will schedule a 2 week follow up appointment for you to review your pathology results with your physician.   *Please arrive 30 min prior to your scheduled procedure time*  Who do I contact if I have questions?  Please call Urology and [facility] at [phone] Opt # 3 to speak to a nurse.

## 2019-05-03 NOTE — NURSING NOTE
Chief Complaint   Patient presents with     Surgical Followup     Patient is here for post surgery follow up     Glucose     Also here for elevated glucose       Rich Sherman CMA (AAMA) at 1:52 PM on 5/3/2019

## 2019-05-03 NOTE — PROGRESS NOTES
[facility]   Jaswinder Anne MD  05/03/2019      Chief Complaint:   Surgical Followup and Glucose       History of Present Illness:   Loy Limon is an immunosuppressed 63 year old male with a history of hepatocellular carcinoma with cirrhosis and biliary stricture s/p liver transplant, and drug-induced diabetes mellitus who presents for evaluation of glucose and surgical follow-up. Due to language barrier, an  was present during the history-taking and subsequent discussion (and for part of the physical exam) with this patient.    Surgery: He had an endoscopic retrograde cholangiopancreatography with biliary stent removal and stone extraction on 4/29/19. He notes having abdominal swelling since this surgery. He notes that his stomach feels numb and is very scarred. He has a good appetite and denies nausea and vomiting. He denies diarrhea or constipation.     Elevated glucose: He has his list of medications with him today. He does take a daily Asprin. His previous blood work showed mild elevation of glucose. He was first told his blood sugar was high a few months ago. He was previously on insulin but is no longer on this. When he checks his blood sugar at home it is always been elevated. His morning fasting BG was 164 mg/dl. He is not currently on any steroids.      Review of Systems:   Pertinent items are noted in HPI, remainder of complete ROS is negative.      Active Medications:      alfuzosin ER (UROXATRAL) 10 MG 24 hr tablet, Take 1 tablet (10 mg) by mouth daily, Disp: 90 tablet, Rfl: 3     amLODIPine (NORVASC) 10 MG tablet, Take 1 tablet (10 mg) by mouth daily, Disp: 30 tablet, Rfl: 11     aspirin (ASA) 325 MG EC tablet, Take 1 tablet (325 mg) by mouth daily, Disp: 30 tablet, Rfl: 11     ciprofloxacin (CIPRO) 500 MG tablet, Take 1 tablet (500 mg) by mouth 2 times daily for 5 days, Disp: 10 tablet, Rfl: 0     glucose 40 % (400 mg/mL) GEL gel, Take 15-30 g by mouth every 15  minutes as needed for low blood sugar, Disp: 30 g, Rfl: 3     magnesium citrate solution, Take 296 mLs by mouth daily, Disp: 296 mL, Rfl: 0     metoprolol tartrate 75 MG TABS, Take 75 mg by mouth 2 times daily, Disp: 60 tablet, Rfl: 11     multivitamin w/minerals (THERA-VIT-M) tablet, Take 1 tablet by mouth daily, Disp: 90 tablet, Rfl: 3     polyethylene glycol (MIRALAX/GLYCOLAX) packet, Take 17 g by mouth daily as needed for constipation, Disp: 7 packet, Rfl: 1     polyvinyl alcohol (LIQUIFILM TEARS) 1.4 % ophthalmic solution, Place 1 drop into both eyes as needed for dry eyes, Disp: 15 mL, Rfl: 3     sulfamethoxazole-trimethoprim (BACTRIM/SEPTRA) 400-80 MG tablet, Take 1 tablet by mouth daily, Disp: 30 tablet, Rfl: 5     tacrolimus (GENERIC EQUIVALENT) 1 MG capsule, Take 6 capsules (6 mg) by mouth 2 times daily, Disp: 360 capsule, Rfl: 3     urea (GORMEL) 20 % external cream, Apply as a moisturizer to your whole body everyday., Disp: 480 g, Rfl: 5     valGANciclovir (VALCYTE) 450 MG tablet, Take 2 tablets (900 mg) by mouth daily, Disp: 69 tablet, Rfl: 0      Allergies:   Patient has no known allergies.      Past Medical History:  Biliary stricture of transplanted liver  Hepatocellular carcinoma  Cirrhosis of liver  Enlarged prostate  Inguinal hernia  Liver lesion  Portal vein thrombosis   Pre-diabetes  Immunosuppressed status   Hypervolemia  Atrial fibrillation with rapid ventricular response   Hematuria  Bilateral wheezing  Hypoxia  Hyperglycemia  Essential hypertension   Constipation  Drug-induced diabetes mellitus      Past Surgical History:  Appendectomy   Endoscopic retrograde cholangiopancreatogram (x3) - 2019  Herniorrhaphy inguinal - 2018  IR liver biopsy percutaneous - 2018  Transplant liver recipient  donor - 2018    Family History:   Liver disease - brother      Social History:   The patient is , a former smoker (quit date 3/14/2018 - has one cigarette after  dinner), and does not consume alcohol. He quit drinking in February 2018. He was born in [place] and came to the [place] 30 years ago.      Physical Exam:   /58 (BP Location: Right arm, Patient Position: Sitting, Cuff Size: Adult Regular)   Pulse 53   Temp 98.8  F (37.1  C) (Oral)   Resp 18   Wt 77.7 kg (171 lb 6.4 oz)   BMI 26.85 kg/m     Constitutional: Alert. In no distress.  Head: Normocephalic. No masses, lesions, tenderness or abnormalities.  Gastrointestinal: Abdomen soft. Non-tender. BS normal. No masses or organomegaly. He outlines an area under the mid-section of his scar where the skin is numb and tingly.  Psychiatric: Mentation appears normal. Normal affect.        Assessment and Plan:  DM type 2, goal HbA1c 7%-8% (H)  He is off of Prednisone. He is checking his sugars at home. I will see him back in a month and we will review these.   - metFORMIN (GLUCOPHAGE-XR) 500 MG 24 hr tablet  Dispense: 60 tablet; Refill: 3     Abdominal numbness  We discussed this is quite common post surgery and can take a year or more for symptoms to improve maximally.    Follow-up: Return in about 1 month (around 6/3/2019).         Scribe Disclosure:  I, Elizabeth Azevedo, am serving as a scribe to document services personally performed by Jaswinder Anne MD at this visit, based upon the provider's statements to me. All documentation has been reviewed by the aforementioned provider prior to being entered into the official medical record.     Portions of this medical record were completed by a scribe. UPON MY REVIEW AND AUTHENTICATION BY ELECTRONIC SIGNATURE, this confirms (a) I performed the applicable clinical services, and (b) the record is accurate.   Jaswinder Anne MD

## 2019-05-07 ENCOUNTER — THERAPY VISIT (OUTPATIENT)
Dept: SPEECH THERAPY | Facility: CLINIC | Age: 64
End: 2019-05-07
Payer: COMMERCIAL

## 2019-05-07 DIAGNOSIS — R13.10 DYSPHAGIA, UNSPECIFIED TYPE: ICD-10-CM

## 2019-05-07 DIAGNOSIS — R13.12 OROPHARYNGEAL DYSPHAGIA: Primary | ICD-10-CM

## 2019-05-13 ENCOUNTER — OFFICE VISIT (OUTPATIENT)
Dept: OTOLARYNGOLOGY | Facility: CLINIC | Age: 64
End: 2019-05-13
Payer: COMMERCIAL

## 2019-05-13 ENCOUNTER — PRE VISIT (OUTPATIENT)
Dept: OTOLARYNGOLOGY | Facility: CLINIC | Age: 64
End: 2019-05-13

## 2019-05-13 VITALS
WEIGHT: 175 LBS | RESPIRATION RATE: 18 BRPM | BODY MASS INDEX: 27.47 KG/M2 | DIASTOLIC BLOOD PRESSURE: 69 MMHG | HEART RATE: 48 BPM | SYSTOLIC BLOOD PRESSURE: 121 MMHG | HEIGHT: 67 IN

## 2019-05-13 DIAGNOSIS — J33.9 NASAL POLYP: Primary | ICD-10-CM

## 2019-05-13 ASSESSMENT — PAIN SCALES - GENERAL: PAINLEVEL: NO PAIN (0)

## 2019-05-13 ASSESSMENT — MIFFLIN-ST. JEOR: SCORE: 1542.54

## 2019-05-13 NOTE — LETTER
5/13/2019       RE: Loy Limon  [address]  [place] [address]     Dear Colleague,    Thank you for referring your patient, Loy Limon, to the [facility] EAR NOSE AND [facility]ROAT at [facility]. Please see a copy of my visit note below.    HISTORY OF PRESENT ILLNESS:  Mr. Limon is here to see us today.  He previously has been seen by Dr. Hull in March.  He will be seeing Dr. Abel tomorrow regarding his voice.  He is here to see me to evaluate a nasal finding on the left side.  The patient had been noted to have an irregularity on the head of the middle turbinate on previous exam.      The patient states that he does not usually have substantial problems with his nose, he is usually able to breathe fairly comfortably through his nose.  His ability to smell is diminished at times.  He does not use any nasal sprays currently.      PHYSICAL EXAMINATION:  The patient is in no distress, alert and appropriate.  Breathing without difficulty or stridor.  We did use a Swazi  over the iPad today for interpretation.      PROCEDURE:  After verbal consent was obtained, left nasal endoscopy is performed.  The middle turbinate is visualized.  There is some mucosal irregularity on the head of the middle turbinate; however, it is smooth.  There is what appears to be some mild polypoid change.  It does not have the appearance of a concerning mass.      ASSESSMENT:  A 63-year-old with some mucosal polypoid change of the head  the middle turbinate. It does not, at this point, appear to be something that would require further intervention.      PLAN:  He will be seeing Dr. Abel tomorrow regarding his voice.  I would be happy to see him back with any time regarding any nasal symptoms.      SJ/ms         Again, thank you for allowing me to participate in the care of your patient.      Sincerely,    Jasvir Robles MD

## 2019-05-13 NOTE — NURSING NOTE
"Chief Complaint   Patient presents with     Consult     nasal polyp      Blood pressure 121/69, pulse (!) 48, resp. rate 18, height 1.71 m (5' 7.32\"), weight 79.4 kg (175 lb).    Emmanuel Olmos LPN    "

## 2019-05-13 NOTE — PROGRESS NOTES
HISTORY OF PRESENT ILLNESS:  Mr. Limon is here to see us today.  He previously has been seen by Dr. Hull in March.  He will be seeing Dr. Abel tomorrow regarding his voice.  He is here to see me to evaluate a nasal finding on the left side.  The patient had been noted to have an irregularity on the head of the middle turbinate on previous exam.      The patient states that he does not usually have substantial problems with his nose, he is usually able to breathe fairly comfortably through his nose.  His ability to smell is diminished at times.  He does not use any nasal sprays currently.      PHYSICAL EXAMINATION:  The patient is in no distress, alert and appropriate.  Breathing without difficulty or stridor.  We did use a Yemeni  over the iPad today for interpretation.      PROCEDURE:  After verbal consent was obtained, left nasal endoscopy is performed.  The middle turbinate is visualized.  There is some mucosal irregularity on the head of the middle turbinate; however, it is smooth.  There is what appears to be some mild polypoid change.  It does not have the appearance of a concerning mass.      ASSESSMENT:  A 63-year-old with some mucosal polypoid change of the head  the middle turbinate. It does not, at this point, appear to be something that would require further intervention.      PLAN:  He will be seeing Dr. Abel tomorrow regarding his voice.  I would be happy to see him back with any time regarding any nasal symptoms.      ANTONI/ms

## 2019-05-13 NOTE — PATIENT INSTRUCTIONS
Thank You for choosing [facility]. You were seen in the ENT Clinic today    would like you to follow up as needed      If you have any questions or concerns after your appointment, please  Call [phone].

## 2019-05-14 ENCOUNTER — PRE VISIT (OUTPATIENT)
Dept: OTOLARYNGOLOGY | Facility: CLINIC | Age: 64
End: 2019-05-14

## 2019-05-14 ENCOUNTER — OFFICE VISIT (OUTPATIENT)
Dept: OTOLARYNGOLOGY | Facility: CLINIC | Age: 64
End: 2019-05-14
Payer: COMMERCIAL

## 2019-05-14 VITALS
HEIGHT: 67 IN | BODY MASS INDEX: 27.47 KG/M2 | DIASTOLIC BLOOD PRESSURE: 65 MMHG | SYSTOLIC BLOOD PRESSURE: 120 MMHG | WEIGHT: 175 LBS

## 2019-05-14 DIAGNOSIS — R49.0 HOARSENESS: Primary | ICD-10-CM

## 2019-05-14 DIAGNOSIS — R49.0 MUSCLE TENSION DYSPHONIA: ICD-10-CM

## 2019-05-14 ASSESSMENT — MIFFLIN-ST. JEOR: SCORE: 1537.42

## 2019-05-14 NOTE — LETTER
5/14/2019       RE: Loy Limon  [address]  [place] [address]     Dear Colleague,    Thank you for referring your patient, Loy Limon, to the [facility] at [facility]. Please see a copy of my visit note below.    [facility]ICE [facility]INIC  Evaluation report    Clinician: Ryan Apple M.M., M.A., CCC/SLP  Seen in conjunction with: Dr. Abel  Referring physician:  Dr. Hull  Patient: Loy Limon  Date of Visit: 5/14/2019    HISTORY  Chief complaint: Loy Limon is a 63 year old Citizen of [place] speaking gentleman presenting today for evaluation of hoarseness and throat clearing.    Salient history: He has a complicated history significant for hepatocellular's carcinoma with cirrhosis and biliary structures status post liver transplant, drug-induced diabetes, inguina hernia s/p repair, and appendectomy.  He was seen by Dr. Hull on 3/14/2019 and described a approximately 1 year history of hoarseness and choking that began without inciting incident.  Evaluation on that day demonstrated a left nasal mass, but an otherwise normal laryngeal exam.  He was referred to our clinic for further evaluation of hoarseness, to Dr. Medrano for left nasal mass, and for videofluoroscopic swallow study to characterize the nature of the patient's dysphagia.  Swallow study performed on 4/2/2019 demonstrated mild oropharyngeal dysphagia characterized by weakness, particularly late initiation of the swallow, and flash penetration on thin liquids.  Patient subsequently had 2 sessions of speech therapy and as of 5/7/2019 was discharged with resolution of his symptoms.      With regards to his hoarseness this happens predominantly when alternating between very hot followed by very cold sips of liquid.  After this his voice will be very hoarse for approximately 2 hours.  Voice will subsequently returned to normal.  He is able to meet his vocal demands at work, and  does not feel significantly bothered by the symptoms since he identified triggers.  He is seen today with the help of Ukrainian .    OTHER PERTINENT HISTORY    Otherwise unknown.  Please also refer to Dr. Abel's dictation.     Past Medical History:   Diagnosis Date     Biliary stricture of transplanted liver (H) 2018     Cancer (H)     hepatocellualr carcinoma     Cirrhosis of liver (H) 2018     Enlarged prostate      Inguinal hernia     Repaired with mesh on 18     Liver lesion 2018     Liver transplanted (H) 2018     donor liver transplant     Portal vein thrombosis     on path explant     Postoperative atrial fibrillation (H) 2018     Pre-diabetes 2018     Past Surgical History:   Procedure Laterality Date     APPENDECTOMY       COLONOSCOPY           ENDOSCOPIC RETROGRADE CHOLANGIOPANCREATOGRAM N/A 2018    Procedure: ENDOSCOPIC RETROGRADE CHOLANGIOPANCREATOGRAM, with Billary Sphincterotomy and Billary Stent Placement;  Surgeon: Omero Lawler MD;  Location: [facility] OR     ENDOSCOPIC RETROGRADE CHOLANGIOPANCREATOGRAM  2019    Procedure: COMBINED ENDOSCOPIC RETROGRADE CHOLANGIOPANCREATOGRAPHY, Bile Duct Stent Exchange;  Surgeon: Omero Lawler MD;  Location: [facility] OR     ENDOSCOPIC RETROGRADE CHOLANGIOPANCREATOGRAM N/A 2019    Procedure: ENDOSCOPIC RETROGRADE CHOLANGIOPANCREATOGRAPHY, WITH BILIARY STENT REMOVAL AND STONE EXTRACTION;  Surgeon: Omero Lawler MD;  Location: [facility] OR     HERNIORRHAPHY INGUINAL  2018    Procedure: Herniorrhaphy inguinal;  Surgeon: Emil Echevarria MD;  Location: [facility] OR     IR LIVER BIOPSY PERCUTANEOUS  2018     TRANSPLANT LIVER RECIPIENT  DONOR N/A 2018    Procedure: TRANSPLANT LIVER RECIPIENT  DONOR;  Surgeon: Emil Echevarria MD;  Location: [facility] OR     OBJECTIVE    PERCEPTUAL EVALUATION (CPT 34254)  POSTURE / TENSION:     neck and  shoulders    BREATHING:     appears within normal limits and adequate     LARYNGEAL PALPATION:     Mild tenderness of the thyrohyoid area    VOICE:    Roughness: Mild Consistent    Breathiness: Minimal    Strain: Mild to moderate frequent    Loudness    Conversational speech:  WNL    Projected speech:  WNL    Pitch:    Conversational speech:  WNL    Pitch glide:     Able to access loft register    Decreased roughness with elevated F0    Resonance:    Conversational speech:  laryngeal pharyngeal resonance    Singing vs. Speech:  Decreased roughness in sustained phonation / singing vs. Running speech    CAPE-V Overall Severity:  33/100    COUGH/THROAT CLEARING:    Not observed    THERAPY PROBES: Improvement was elicited with use of forward resonant stimuli and coordination of respiration and phonation    ____________________________________________________________________  Laryngeal Function Studies   laryngeal function studies were not completed today, but will be completed should the patient choose to initiate therapy.  ____________________________________________________________________    LARYNGEAL EXAMINATION  Procedure: Flexible endoscopy with chip-tip technology without stroboscopy, left nostril; topical anesthesia with 3% Lidocaine and 0.25% phenylephrine was applied.   Performed by: Dr. Abel   The laryngeal and pharyngeal structures were evaluated for gross appearance, mobility, function, and focal lesions / abnormalities of the associated mucosa.    All findings were within normal limits with the exception of the following salient features:     Essentially healthy laryngeal and vocal fold mucosa    Vocal folds demonstrate bilateral mild concavity of the vocal fold vibratory margins with prominent vocal processes    There are no focal lesions or abnormalities on the vocal fold mucosa or throughout the supraglottis and pharynx    With phonatory tasks moderate four-way constrictive supraglottic hyperfunction,  and when the patient imitates is hoarseness following sips of hot than cold liquid there is profound contracture of the supraglottis and increased strain in voicing.    Reduced supraglottic hyperfunction is noted with forward resonant stimuli and coordination of phonation and respiration      Supraglottic hyperfunction during running speech    The laryngeal exam was reviewed with Mr. Limon, and I provided pertinent explanations, as well as written and oral information.    ASSESSMENT / PLAN  IMPRESSIONS: Loy Limon is presenting today with R49.0 (Dysphonia) in the context of an imbalance in function of the intrinsic and extrinsic muscles of the larynx and non-optimal coordination of phonation and respiration.  Laryngeal evaluation  demonstrates essentially healthy laryngeal and vocal fold mucosa without focal lesion or abnormality, but with significant supraglottic hyperfunction during phonatory tasks accounting for the patient's dysphonia.  This tendency is exacerbated when imitating his acute episodes of hoarseness.    STIMULABILITY: results of therapy probes during perceptual and laryngeal evaluation demonstrate improvement with use of forward resonant stimuli and coordination of respiration and phonation    RECOMMENDATIONS:     A course of speech therapy is warranted  to optimize vocal technique, improve voice quality and promote reduced laryngeal and perilaryngeal hyperfunction associated with dysphonia. However, at this point the patient feels that he is able to avoid the issue by steering clear of his triggering events, and does not wish to pursue therapy at this time.  He was encouraged to reach out if his needs or status change in the future.     Should therapy be initiated in the future laryngeal function studies to fully characterize baseline function and guide in determining therapeutic modalities would be warranted at that time    Evaluation only      This treatment plan was developed with the  patient who agreed with the recommendations.      TOTAL SERVICE TIME: 45 minutes  EVALUATION OF VOICE AND RESONANCE (47654)  NO CHARGE FACILITY FEE (04738)    Ryan Apple M.M., M.A., CCC-SLP  Speech-Language Pathologist  Certificate of Vocology  [phone]    *this report was created in part through the use of computerized dictation software, and though reviewed following completion, some typographic errors may persist.  If there is confusion regarding any of this notes contents, please contact me for clarification.*      Again, thank you for allowing me to participate in the care of your patient.      Sincerely,    Dean Apple, SLP

## 2019-05-14 NOTE — PROGRESS NOTES
HISTORY OF PRESENT ILLNESS: Loy Limon is a 63 year old male with a history of with a history of dysphasia and chronic cough.  Both been going on for approximately a year.  He did have a video swallow study performed on 4/2/2019 he describes a sensation of phlegm in 3 frequent throat clearing as well as difficulty with drier items.  At the time of a swallow study he is not modifying his intake behaviors.  He did not have any history suggestive of reflux.  Swallow study results showed the oral mechanism to be unremarkable but the oral phase of swallow was to be slowed and executed with reduced anterior posterior movement.  Oral residue observed with smaller bolus presentations and some intentional bolus hold with larger presentation.  There was reduced anterior movement of the hyolaryngeal structures with minimal amounts of residue noted in the piriform sinuses.  With thickened liquids there was intermittent penetration observed.  With increased viscosity swallow triggered closer to the vallecula with no evidence of penetration observed.  No aspiration was noted throughout the study.  He was felt to be appropriate for regular textures and thin liquids when self selecting softer and preferred items for he was recommended to take small bites and sips and to pace himself alternating consistencies to remain upright after intake.  He did have a few sessions with the therapist and feels that he is doing well in terms of his swallow.    He also complains of chronic hoarseness.  This is been going on for the last 7 years.  He notes that this occurs almost exclusively when he drinks hot liquid following cold liquid.  He will have a tight and strangled quality to his voice over the next 2 hours and then he will return back to normal.  He has no other vocal fatigue no other vocal symptoms.    He is status post a transplantation for hepatocellular carcinoma on 12/22/2018.  His liver functions have been doing well and is  going to be weaned off some of his medications.    Last 2 Scores for Patient-Answered VHI Questionnaire  VHI Total Score 2019   VHI Total Score Incomplete       Last 2 Scores for Patient-Answered CSI Questionnaire  CSI Total Score 2019   CSI Total Score Incomplete         Last 2 Scores for Patient-Answered EAT Questionnaire  EAT Total Score 2019   EAT Total Score Incomplete           PAST MEDICAL HISTORY:   Past Medical History:   Diagnosis Date     Biliary stricture of transplanted liver (H) 2018     Cancer (H)     hepatocellualr carcinoma     Cirrhosis of liver (H) 2018     Enlarged prostate      Inguinal hernia     Repaired with mesh on 18     Liver lesion 2018     Liver transplanted (H) 2018     donor liver transplant     Portal vein thrombosis     on path explant     Postoperative atrial fibrillation (H) 2018     Pre-diabetes 2018       PAST SURGICAL HISTORY:   Past Surgical History:   Procedure Laterality Date     APPENDECTOMY       COLONOSCOPY           ENDOSCOPIC RETROGRADE CHOLANGIOPANCREATOGRAM N/A 2018    Procedure: ENDOSCOPIC RETROGRADE CHOLANGIOPANCREATOGRAM, with Billary Sphincterotomy and Billary Stent Placement;  Surgeon: Omero Lawler MD;  Location: [facility] OR     ENDOSCOPIC RETROGRADE CHOLANGIOPANCREATOGRAM  2019    Procedure: COMBINED ENDOSCOPIC RETROGRADE CHOLANGIOPANCREATOGRAPHY, Bile Duct Stent Exchange;  Surgeon: Omero Lawler MD;  Location: [facility] OR     ENDOSCOPIC RETROGRADE CHOLANGIOPANCREATOGRAM N/A 2019    Procedure: ENDOSCOPIC RETROGRADE CHOLANGIOPANCREATOGRAPHY, WITH BILIARY STENT REMOVAL AND STONE EXTRACTION;  Surgeon: Omero Lawler MD;  Location: [facility] OR     HERNIORRHAPHY INGUINAL  2018    Procedure: Herniorrhaphy inguinal;  Surgeon: Emil Echevarria MD;  Location: [facility] OR     IR LIVER BIOPSY PERCUTANEOUS  2018     TRANSPLANT LIVER RECIPIENT  DONOR N/A 2018     Procedure: TRANSPLANT LIVER RECIPIENT  DONOR;  Surgeon: Emil Echevarria MD;  Location:  OR       FAMILY HISTORY:   Family History   Problem Relation Age of Onset     Liver Disease Brother      Skin Cancer No family hx of      Melanoma No family hx of        SOCIAL HISTORY:   Social History     Tobacco Use     Smoking status: Former Smoker     Packs/day: 0.03     Years: 20.00     Pack years: 0.60     Types: Cigarettes, Cigars     Start date: 1970     Last attempt to quit: 3/14/2018     Years since quittin.1     Smokeless tobacco: Never Used     Tobacco comment: Quit smoking 2018, one cigarette after dinner   Substance Use Topics     Alcohol use: No     Comment: Quit drinking 2018       REVIEW OF SYSTEMS: Ten point review of systems was performed and is negative except for:   [facility] ROS 2019   Neurology Dizzy spells, Headache   Ears, Nose, Throat -   Cardiopulmonary -   Musculoskeletal -        ALLERGIES: Patient has no known allergies.    MEDICATIONS:   Current Outpatient Medications   Medication Sig Dispense Refill     alfuzosin ER (UROXATRAL) 10 MG 24 hr tablet Take 1 tablet (10 mg) by mouth daily 90 tablet 3     amLODIPine (NORVASC) 10 MG tablet Take 1 tablet (10 mg) by mouth daily 30 tablet 11     aspirin (ASA) 325 MG EC tablet Take 1 tablet (325 mg) by mouth daily 30 tablet 11     blood glucose (NO BRAND SPECIFIED) lancets standard Use to test blood sugar 4 times daily or as directed. 200 each 11     blood glucose (ONETOUCH VERIO IQ) test strip Use to test blood sugar 4 times daily or as directed. 200 strip 11     ciprofloxacin (CIPRO) 500 MG tablet Take 1 tablet (500 mg) by mouth 2 times daily 6 tablet 0     glucose 40 % (400 mg/mL) GEL gel Take 15-30 g by mouth every 15 minutes as needed for low blood sugar 30 g 3     magnesium citrate solution Take 296 mLs by mouth daily 296 mL 0     metFORMIN (GLUCOPHAGE-XR) 500 MG 24 hr tablet Take 2 tablets (1,000 mg) by mouth daily  (with dinner) 60 tablet 3     metoprolol tartrate 75 MG TABS Take 75 mg by mouth 2 times daily 60 tablet 11     multivitamin w/minerals (THERA-VIT-M) tablet Take 1 tablet by mouth daily 90 tablet 3     polyethylene glycol (MIRALAX/GLYCOLAX) packet Take 17 g by mouth daily as needed for constipation 7 packet 1     polyvinyl alcohol (LIQUIFILM TEARS) 1.4 % ophthalmic solution Place 1 drop into both eyes as needed for dry eyes 15 mL 3     Sharps Container MISC 1 each daily 1 each 2     sulfamethoxazole-trimethoprim (BACTRIM/SEPTRA) 400-80 MG tablet Take 1 tablet by mouth daily 30 tablet 5     tacrolimus (GENERIC EQUIVALENT) 1 MG capsule Take 6 capsules (6 mg) by mouth 2 times daily 360 capsule 3     urea (GORMEL) 20 % external cream Apply as a moisturizer to your whole body everyday. 480 g 5     valGANciclovir (VALCYTE) 450 MG tablet Take 2 tablets (900 mg) by mouth daily 69 tablet 0         PHYSICAL EXAMINATION:  He  is awake, alert and in no apparent distress.    His tympanic membranes are clear and intact bilaterally. External auditory canals are clear.  Nasal exam shows a mild septal deviation without obstruction.  Examination of the oral cavity shows no suspicious lesions.  There is symmetric movement of the tongue and soft palate.    The oropharynx is clear.  His neck is supple without significant adenopathy.  Pulse is regular.  Upper airway is clear.  Cranial nerves II-XII are grossly intact.       PROCEDURE: A flexible laryngoscopy  was performed.  Informed consent was obtained and a time out was performed. 3% lidocaine and 0.25% phenylephrine was sprayed into the nasal cavity and allowed 3 to 5 minutes for effect. The scope was passed through the left sided nostril. Examination showed the vocal folds to be mobile and meet in the midline.  No nodules, polyps or ulcerations are seen.  There is mild inflammation or erythema of the supraglottic or glottic larynx.  With phonation there is mild contraction of the  supraglottic larynx.  I did ask him to duplicate the sound that he gets after drinking hot after cold liquids.  He did demonstrate moderate to severe supraglottic hyperfunction.  The hypopharynx is otherwise clear as is the subglottis.     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  He is very happy that we did not find any other mass lesions that he does have a smoking history of approximately 43 years.  All questions were answered through an .  I did offer  him a brief trial of speech therapy but he declined.  I will see him back on an as-needed basis.

## 2019-05-14 NOTE — LETTER
5/14/2019       RE: Loy Limon  [address]  [facility] [address]     Dear Colleague,    Thank you for referring your patient, Loy Limon, to the [facility]. Please see a copy of my visit note below.    HISTORY OF PRESENT ILLNESS: Loy Limon is a 63 year old male with a history of with a history of dysphasia and chronic cough.  Both been going on for approximately a year.  He did have a video swallow study performed on 4/2/2019 he describes a sensation of phlegm in 3 frequent throat clearing as well as difficulty with drier items.  At the time of a swallow study he is not modifying his intake behaviors.  He did not have any history suggestive of reflux.  Swallow study results showed the oral mechanism to be unremarkable but the oral phase of swallow was to be slowed and executed with reduced anterior posterior movement.  Oral residue observed with smaller bolus presentations and some intentional bolus hold with larger presentation.  There was reduced anterior movement of the hyolaryngeal structures with minimal amounts of residue noted in the piriform sinuses.  With thickened liquids there was intermittent penetration observed.  With increased viscosity swallow triggered closer to the vallecula with no evidence of penetration observed.  No aspiration was noted throughout the study.  He was felt to be appropriate for regular textures and thin liquids when self selecting softer and preferred items for he was recommended to take small bites and sips and to pace himself alternating consistencies to remain upright after intake.  He did have a few sessions with the therapist and feels that he is doing well in terms of his swallow.    He also complains of chronic hoarseness.  This is been going on for the last 7 years.  He notes that this occurs almost exclusively when he drinks hot liquid following cold liquid.  He will  have a tight and strangled quality to his voice over the next 2 hours and then he will return back to normal.  He has no other vocal fatigue no other vocal symptoms.    He is status post a transplantation for hepatocellular carcinoma on 2018.  His liver functions have been doing well and is going to be weaned off some of his medications.    Last 2 Scores for Patient-Answered VHI Questionnaire  VHI Total Score 2019   VHI Total Score Incomplete       Last 2 Scores for Patient-Answered CSI Questionnaire  CSI Total Score 2019   CSI Total Score Incomplete         Last 2 Scores for Patient-Answered EAT Questionnaire  EAT Total Score 2019   EAT Total Score Incomplete           PAST MEDICAL HISTORY:   Past Medical History:   Diagnosis Date     Biliary stricture of transplanted liver (H) 2018     Cancer (H)     hepatocellualr carcinoma     Cirrhosis of liver (H) 2018     Enlarged prostate      Inguinal hernia     Repaired with mesh on 18     Liver lesion 2018     Liver transplanted (H) 2018     donor liver transplant     Portal vein thrombosis     on path explant     Postoperative atrial fibrillation (H) 2018     Pre-diabetes 2018       PAST SURGICAL HISTORY:   Past Surgical History:   Procedure Laterality Date     APPENDECTOMY       COLONOSCOPY      2018     ENDOSCOPIC RETROGRADE CHOLANGIOPANCREATOGRAM N/A 2018    Procedure: ENDOSCOPIC RETROGRADE CHOLANGIOPANCREATOGRAM, with Billary Sphincterotomy and Billary Stent Placement;  Surgeon: Omero Lawler MD;  Location: [facility]     ENDOSCOPIC RETROGRADE CHOLANGIOPANCREATOGRAM  2019    Procedure: COMBINED ENDOSCOPIC RETROGRADE CHOLANGIOPANCREATOGRAPHY, Bile Duct Stent Exchange;  Surgeon: Omero Lawler MD;  Location: [facility]     ENDOSCOPIC RETROGRADE CHOLANGIOPANCREATOGRAM N/A 2019    Procedure: ENDOSCOPIC RETROGRADE CHOLANGIOPANCREATOGRAPHY, WITH BILIARY STENT REMOVAL AND STONE  EXTRACTION;  Surgeon: Omero Lawler MD;  Location: [facility] OR     HERNIORRHAPHY INGUINAL  2018    Procedure: Herniorrhaphy inguinal;  Surgeon: Emil Echevarria MD;  Location: [facility]     IR LIVER BIOPSY PERCUTANEOUS  2018     TRANSPLANT LIVER RECIPIENT  DONOR N/A 2018    Procedure: TRANSPLANT LIVER RECIPIENT  DONOR;  Surgeon: Emil Echevarria MD;  Location: [facility]       FAMILY HISTORY:   Family History   Problem Relation Age of Onset     Liver Disease Brother      Skin Cancer No family hx of      Melanoma No family hx of        SOCIAL HISTORY:   Social History     Tobacco Use     Smoking status: Former Smoker     Packs/day: 0.03     Years: 20.00     Pack years: 0.60     Types: Cigarettes, Cigars     Start date: 1970     Last attempt to quit: 3/14/2018     Years since quittin.1     Smokeless tobacco: Never Used     Tobacco comment: Quit smoking 2018, one cigarette after dinner   Substance Use Topics     Alcohol use: No     Comment: Quit drinking 2018       REVIEW OF SYSTEMS: Ten point review of systems was performed and is negative except for:   [facility] ROS 2019   Neurology Dizzy spells, Headache   Ears, Nose, Throat -   Cardiopulmonary -   Musculoskeletal -        ALLERGIES: Patient has no known allergies.    MEDICATIONS:   Current Outpatient Medications   Medication Sig Dispense Refill     alfuzosin ER (UROXATRAL) 10 MG 24 hr tablet Take 1 tablet (10 mg) by mouth daily 90 tablet 3     amLODIPine (NORVASC) 10 MG tablet Take 1 tablet (10 mg) by mouth daily 30 tablet 11     aspirin (ASA) 325 MG EC tablet Take 1 tablet (325 mg) by mouth daily 30 tablet 11     blood glucose (NO BRAND SPECIFIED) lancets standard Use to test blood sugar 4 times daily or as directed. 200 each 11     blood glucose (ONETOUCH VERIO IQ) test strip Use to test blood sugar 4 times daily or as directed. 200 strip 11     ciprofloxacin (CIPRO) 500 MG tablet Take 1 tablet (500 mg) by  mouth 2 times daily 6 tablet 0     glucose 40 % (400 mg/mL) GEL gel Take 15-30 g by mouth every 15 minutes as needed for low blood sugar 30 g 3     magnesium citrate solution Take 296 mLs by mouth daily 296 mL 0     metFORMIN (GLUCOPHAGE-XR) 500 MG 24 hr tablet Take 2 tablets (1,000 mg) by mouth daily (with dinner) 60 tablet 3     metoprolol tartrate 75 MG TABS Take 75 mg by mouth 2 times daily 60 tablet 11     multivitamin w/minerals (THERA-VIT-M) tablet Take 1 tablet by mouth daily 90 tablet 3     polyethylene glycol (MIRALAX/GLYCOLAX) packet Take 17 g by mouth daily as needed for constipation 7 packet 1     polyvinyl alcohol (LIQUIFILM TEARS) 1.4 % ophthalmic solution Place 1 drop into both eyes as needed for dry eyes 15 mL 3     Sharps Container MISC 1 each daily 1 each 2     sulfamethoxazole-trimethoprim (BACTRIM/SEPTRA) 400-80 MG tablet Take 1 tablet by mouth daily 30 tablet 5     tacrolimus (GENERIC EQUIVALENT) 1 MG capsule Take 6 capsules (6 mg) by mouth 2 times daily 360 capsule 3     urea (GORMEL) 20 % external cream Apply as a moisturizer to your whole body everyday. 480 g 5     valGANciclovir (VALCYTE) 450 MG tablet Take 2 tablets (900 mg) by mouth daily 69 tablet 0         PHYSICAL EXAMINATION:  He  is awake, alert and in no apparent distress.    His tympanic membranes are clear and intact bilaterally. External auditory canals are clear.  Nasal exam shows a mild septal deviation without obstruction.  Examination of the oral cavity shows no suspicious lesions.  There is symmetric movement of the tongue and soft palate.    The oropharynx is clear.  His neck is supple without significant adenopathy.  Pulse is regular.  Upper airway is clear.  Cranial nerves II-XII are grossly intact.       PROCEDURE: A flexible laryngoscopy  was performed.  Informed consent was obtained and a time out was performed. 3% lidocaine and 0.25% phenylephrine was sprayed into the nasal cavity and allowed 3 to 5 minutes for  effect. The scope was passed through the left sided nostril. Examination showed the vocal folds to be mobile and meet in the midline.  No nodules, polyps or ulcerations are seen.  There is mild inflammation or erythema of the supraglottic or glottic larynx.  With phonation there is mild contraction of the supraglottic larynx.  I did ask him to duplicate the sound that he gets after drinking hot after cold liquids.  He did demonstrate moderate to severe supraglottic hyperfunction.  The hypopharynx is otherwise clear as is the subglottis.     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  He is very happy that we did not find any other mass lesions that he does have a smoking history of approximately 43 years.  All questions were answered through an .  I did offer  him a brief trial of speech therapy but he declined.  I will see him back on an as-needed basis.      Again, thank you for allowing me to participate in the care of your patient.      Sincerely,    Abel Abel MD

## 2019-05-14 NOTE — PROGRESS NOTES
[facility] VOICE CLINIC  Evaluation report    Clinician: Ryan Apple M.M., M.A., CCC/SLP  Seen in conjunction with: Dr. Abel  Referring physician:  Dr. Hull  Patient: Loy Limon  Date of Visit: 5/14/2019    HISTORY  Chief complaint: Loy Limon is a 63 year old Belarusian speaking gentleman presenting today for evaluation of hoarseness and throat clearing.    Salient history: He has a complicated history significant for hepatocellular's carcinoma with cirrhosis and biliary structures status post liver transplant, drug-induced diabetes, inguina hernia s/p repair, and appendectomy.  He was seen by Dr. Hull on 3/14/2019 and described a approximately 1 year history of hoarseness and choking that began without inciting incident.  Evaluation on that day demonstrated a left nasal mass, but an otherwise normal laryngeal exam.  He was referred to our clinic for further evaluation of hoarseness, to Dr. Medrano for left nasal mass, and for videofluoroscopic swallow study to characterize the nature of the patient's dysphagia.  Swallow study performed on 4/2/2019 demonstrated mild oropharyngeal dysphagia characterized by weakness, particularly late initiation of the swallow, and flash penetration on thin liquids.  Patient subsequently had 2 sessions of speech therapy and as of 5/7/2019 was discharged with resolution of his symptoms.      With regards to his hoarseness this happens predominantly when alternating between very hot followed by very cold sips of liquid.  After this his voice will be very hoarse for approximately 2 hours.  Voice will subsequently returned to normal.  He is able to meet his vocal demands at work, and does not feel significantly bothered by the symptoms since he identified triggers.  He is seen today with the help of [place] .    OTHER PERTINENT HISTORY    Otherwise unknown.  Please also refer to Dr. Abel's dictation.     Past Medical History:   Diagnosis Date      Biliary stricture of transplanted liver (H) 2018     Cancer (H)     hepatocellualr carcinoma     Cirrhosis of liver (H) 2018     Enlarged prostate      Inguinal hernia     Repaired with mesh on 18     Liver lesion 2018     Liver transplanted (H) 2018     donor liver transplant     Portal vein thrombosis     on path explant     Postoperative atrial fibrillation (H) 2018     Pre-diabetes 2018     Past Surgical History:   Procedure Laterality Date     APPENDECTOMY       COLONOSCOPY           ENDOSCOPIC RETROGRADE CHOLANGIOPANCREATOGRAM N/A 2018    Procedure: ENDOSCOPIC RETROGRADE CHOLANGIOPANCREATOGRAM, with Billary Sphincterotomy and Billary Stent Placement;  Surgeon: Omero Lawler MD;  Location: [facility] OR     ENDOSCOPIC RETROGRADE CHOLANGIOPANCREATOGRAM  2019    Procedure: COMBINED ENDOSCOPIC RETROGRADE CHOLANGIOPANCREATOGRAPHY, Bile Duct Stent Exchange;  Surgeon: Omero Lawler MD;  Location: [facility] OR     ENDOSCOPIC RETROGRADE CHOLANGIOPANCREATOGRAM N/A 2019    Procedure: ENDOSCOPIC RETROGRADE CHOLANGIOPANCREATOGRAPHY, WITH BILIARY STENT REMOVAL AND STONE EXTRACTION;  Surgeon: Omero Lawler MD;  Location: [facility] OR     HERNIORRHAPHY INGUINAL  2018    Procedure: Herniorrhaphy inguinal;  Surgeon: Emil Echevarria MD;  Location: [facility] OR     IR LIVER BIOPSY PERCUTANEOUS  2018     TRANSPLANT LIVER RECIPIENT  DONOR N/A 2018    Procedure: TRANSPLANT LIVER RECIPIENT  DONOR;  Surgeon: Emil Ehcevarria MD;  Location: [facility] OR     OBJECTIVE    PERCEPTUAL EVALUATION (CPT 80624)  POSTURE / TENSION:     neck and shoulders    BREATHING:     appears within normal limits and adequate     LARYNGEAL PALPATION:     Mild tenderness of the thyrohyoid area    VOICE:    Roughness: Mild Consistent    Breathiness: Minimal    Strain: Mild to moderate frequent    Loudness    Conversational speech:  WNL    Projected  speech:  WNL    Pitch:    Conversational speech:  WNL    Pitch glide:     Able to access loft register    Decreased roughness with elevated F0    Resonance:    Conversational speech:  laryngeal pharyngeal resonance    Singing vs. Speech:  Decreased roughness in sustained phonation / singing vs. Running speech    CAPE-V Overall Severity:  33/100    COUGH/THROAT CLEARING:    Not observed    THERAPY PROBES: Improvement was elicited with use of forward resonant stimuli and coordination of respiration and phonation    ____________________________________________________________________  Laryngeal Function Studies   laryngeal function studies were not completed today, but will be completed should the patient choose to initiate therapy.  ____________________________________________________________________    LARYNGEAL EXAMINATION  Procedure: Flexible endoscopy with chip-tip technology without stroboscopy, left nostril; topical anesthesia with 3% Lidocaine and 0.25% phenylephrine was applied.   Performed by: Dr. Abel   The laryngeal and pharyngeal structures were evaluated for gross appearance, mobility, function, and focal lesions / abnormalities of the associated mucosa.    All findings were within normal limits with the exception of the following salient features:     Essentially healthy laryngeal and vocal fold mucosa    Vocal folds demonstrate bilateral mild concavity of the vocal fold vibratory margins with prominent vocal processes    There are no focal lesions or abnormalities on the vocal fold mucosa or throughout the supraglottis and pharynx    With phonatory tasks moderate four-way constrictive supraglottic hyperfunction, and when the patient imitates is hoarseness following sips of hot than cold liquid there is profound contracture of the supraglottis and increased strain in voicing.    Reduced supraglottic hyperfunction is noted with forward resonant stimuli and coordination of phonation and  respiration      Supraglottic hyperfunction during running speech    The laryngeal exam was reviewed with Mr. Limon, and I provided pertinent explanations, as well as written and oral information.    ASSESSMENT / PLAN  IMPRESSIONS: Loy Limon is presenting today with R49.0 (Dysphonia) in the context of an imbalance in function of the intrinsic and extrinsic muscles of the larynx and non-optimal coordination of phonation and respiration.  Laryngeal evaluation  demonstrates essentially healthy laryngeal and vocal fold mucosa without focal lesion or abnormality, but with significant supraglottic hyperfunction during phonatory tasks accounting for the patient's dysphonia.  This tendency is exacerbated when imitating his acute episodes of hoarseness.    STIMULABILITY: results of therapy probes during perceptual and laryngeal evaluation demonstrate improvement with use of forward resonant stimuli and coordination of respiration and phonation    RECOMMENDATIONS:     A course of speech therapy is warranted  to optimize vocal technique, improve voice quality and promote reduced laryngeal and perilaryngeal hyperfunction associated with dysphonia. However, at this point the patient feels that he is able to avoid the issue by steering clear of his triggering events, and does not wish to pursue therapy at this time.  He was encouraged to reach out if his needs or status change in the future.     Should therapy be initiated in the future laryngeal function studies to fully characterize baseline function and guide in determining therapeutic modalities would be warranted at that time    Evaluation only      This treatment plan was developed with the patient who agreed with the recommendations.      TOTAL SERVICE TIME: 45 minutes  EVALUATION OF VOICE AND RESONANCE (45928)  NO CHARGE FACILITY FEE (93587)    Ryan Apple M.M., M.A., CCC-SLP  Speech-Language Pathologist  Certificate of Vocology  [phone]    *this report  was created in part through the use of computerized dictation software, and though reviewed following completion, some typographic errors may persist.  If there is confusion regarding any of this notes contents, please contact me for clarification.*

## 2019-05-14 NOTE — PATIENT INSTRUCTIONS
1.  You were seen in the ENT Clinic today by Dr. Abel.  If you have any questions or concerns after your appointment, please call [phone].  Press option #1 for scheduling related needs.  Press option #3 for Nurse advice.  2.  Plan is to return to clinic as needed.

## 2019-05-17 ENCOUNTER — DOCUMENTATION ONLY (OUTPATIENT)
Dept: TRANSPLANT | Facility: CLINIC | Age: 64
End: 2019-05-17

## 2019-05-17 ENCOUNTER — TELEPHONE (OUTPATIENT)
Dept: TRANSPLANT | Facility: CLINIC | Age: 64
End: 2019-05-17

## 2019-05-17 NOTE — TELEPHONE ENCOUNTER
Pt calls yesterday and leaves an urgent message to return his call. Writer returns the call with interpretor Federica and finds out that pt wants a new back to work letter. Informed pt that the letter can be sent to him in the mail and requested pt not use a back work letter as an urgent matter to discuss with the nurse. Pt understood.

## 2019-05-22 ENCOUNTER — TELEPHONE (OUTPATIENT)
Dept: TRANSPLANT | Facility: CLINIC | Age: 64
End: 2019-05-22

## 2019-05-22 ENCOUNTER — OFFICE VISIT (OUTPATIENT)
Dept: DERMATOLOGY | Facility: CLINIC | Age: 64
End: 2019-05-22
Payer: COMMERCIAL

## 2019-05-22 VITALS — DIASTOLIC BLOOD PRESSURE: 74 MMHG | SYSTOLIC BLOOD PRESSURE: 150 MMHG | HEART RATE: 49 BPM

## 2019-05-22 DIAGNOSIS — G63 POLYNEUROPATHY ASSOCIATED WITH UNDERLYING DISEASE (H): Primary | ICD-10-CM

## 2019-05-22 DIAGNOSIS — E09.9 DRUG-INDUCED DIABETES MELLITUS (H): ICD-10-CM

## 2019-05-22 DIAGNOSIS — L98.8 DIABETIC DERMOPATHY (H): ICD-10-CM

## 2019-05-22 DIAGNOSIS — E11.628 DIABETIC DERMOPATHY (H): ICD-10-CM

## 2019-05-22 ASSESSMENT — PAIN SCALES - GENERAL: PAINLEVEL: NO PAIN (0)

## 2019-05-22 NOTE — PROGRESS NOTES
[facility] Dermatology Note      Dermatology Problem List:  1. Liver transplant 12/22/18 on tacrolimus and mycophenolate mofetil  2. Skin sensitivity/peripheral neuropathy post transplant in setting of new diagnosis of diabetes (A1c 8.6%; up from 5.8% pre-transplant)  3. Hyperhidrosis of the soles of feet  - Current Rx: Aluminum chloride 20% at bedtime       Encounter Date: May 22, 2019    CC:  Chief Complaint   Patient presents with     Derm Problem     Skin sensitivity post transplant - Loy says bilateral hands and feet have been hot.         History of Present Illness:  Mr. Loy Limon is a 63 year old male who presents as a follow-up for skin sensitivity. The patient was last seen 2/20/19 when he started urea 20% topically and aluminum chloride 20%. Today, the patient reports that he continues to have discomfort and a burning sensation in his feet. He speculates whether this sensation could be due to his history of diabetes. He notes that his diabetes diagnosis was relatively recently, and he states that his blood sugar elevated markedly after his liver transplant due to shakes that they were having him drink. He notes that the blood sugar was elevated to nearly 400, and he was given insulin during this time to control this. After he eats, the discomfort becomes worse, and he speculates that this is due to his blood sugar fluctuating. He reports that the aluminum chloride has been providing noticeable relief of the sweating on his feet. He has also been noticing distention in his abdomen with an associated tingling sensation, and inquires whether this could be due to his transplant. The patient voices no other concerns.    Past Medical History:   Patient Active Problem List   Diagnosis     Cirrhosis of liver (H)     Liver lesion     HCC (hepatocellular carcinoma) (H)     Liver transplant recipient (H)     Immunosuppressed status (H)     Hypervolemia     Atrial fibrillation with  rapid ventricular response (H)     Hematuria     Bilateral wheezing     Hypoxia     Hyperglycemia     Pre-diabetes     Essential hypertension     Constipation     Biliary stricture of transplanted liver (H)     Drug-induced diabetes mellitus (H)     Muscle tension dysphonia     Past Medical History:   Diagnosis Date     Biliary stricture of transplanted liver (H) 2018     Cancer (H)     hepatocellualr carcinoma     Cirrhosis of liver (H) 2018     Enlarged prostate      Inguinal hernia     Repaired with mesh on 18     Liver lesion 2018     Liver transplanted (H) 2018     donor liver transplant     Portal vein thrombosis     on path explant     Postoperative atrial fibrillation (H) 2018     Pre-diabetes 2018     Past Surgical History:   Procedure Laterality Date     APPENDECTOMY       COLONOSCOPY           ENDOSCOPIC RETROGRADE CHOLANGIOPANCREATOGRAM N/A 2018    Procedure: ENDOSCOPIC RETROGRADE CHOLANGIOPANCREATOGRAM, with Billary Sphincterotomy and Billary Stent Placement;  Surgeon: Omero Lawler MD;  Location: [facility] OR     ENDOSCOPIC RETROGRADE CHOLANGIOPANCREATOGRAM  2019    Procedure: COMBINED ENDOSCOPIC RETROGRADE CHOLANGIOPANCREATOGRAPHY, Bile Duct Stent Exchange;  Surgeon: Omero Lawler MD;  Location: [facility] OR     ENDOSCOPIC RETROGRADE CHOLANGIOPANCREATOGRAM N/A 2019    Procedure: ENDOSCOPIC RETROGRADE CHOLANGIOPANCREATOGRAPHY, WITH BILIARY STENT REMOVAL AND STONE EXTRACTION;  Surgeon: Omero Lawler MD;  Location: [facility] OR     HERNIORRHAPHY INGUINAL  2018    Procedure: Herniorrhaphy inguinal;  Surgeon: Emil Echevarria MD;  Location: [facility] OR     IR LIVER BIOPSY PERCUTANEOUS  2018     TRANSPLANT LIVER RECIPIENT  DONOR N/A 2018    Procedure: TRANSPLANT LIVER RECIPIENT  DONOR;  Surgeon: Emil Echevarria MD;  Location: [facility] OR       Social History:  Patient reports that he quit smoking about  14 months ago. His smoking use included cigarettes and cigars. He started smoking about 48 years ago. He has a 0.60 pack-year smoking history. He has never used smokeless tobacco. He reports that he does not drink alcohol or use drugs.    Family History:  Family History   Problem Relation Age of Onset     Liver Disease Brother      Skin Cancer No family hx of      Melanoma No family hx of        Medications:  Current Outpatient Medications   Medication Sig Dispense Refill     alfuzosin ER (UROXATRAL) 10 MG 24 hr tablet Take 1 tablet (10 mg) by mouth daily 90 tablet 3     amLODIPine (NORVASC) 10 MG tablet Take 1 tablet (10 mg) by mouth daily 30 tablet 11     aspirin (ASA) 325 MG EC tablet Take 1 tablet (325 mg) by mouth daily 30 tablet 11     blood glucose (NO BRAND SPECIFIED) lancets standard Use to test blood sugar 4 times daily or as directed. 200 each 11     blood glucose (ONETOUCH VERIO IQ) test strip Use to test blood sugar 4 times daily or as directed. 200 strip 11     glucose 40 % (400 mg/mL) GEL gel Take 15-30 g by mouth every 15 minutes as needed for low blood sugar 30 g 3     magnesium citrate solution Take 296 mLs by mouth daily 296 mL 0     metFORMIN (GLUCOPHAGE-XR) 500 MG 24 hr tablet Take 2 tablets (1,000 mg) by mouth daily (with dinner) 60 tablet 3     metoprolol tartrate 75 MG TABS Take 75 mg by mouth 2 times daily 60 tablet 11     multivitamin w/minerals (THERA-VIT-M) tablet Take 1 tablet by mouth daily 90 tablet 3     polyethylene glycol (MIRALAX/GLYCOLAX) packet Take 17 g by mouth daily as needed for constipation 7 packet 1     polyvinyl alcohol (LIQUIFILM TEARS) 1.4 % ophthalmic solution Place 1 drop into both eyes as needed for dry eyes 15 mL 3     Sharps Container MISC 1 each daily 1 each 2     sulfamethoxazole-trimethoprim (BACTRIM/SEPTRA) 400-80 MG tablet Take 1 tablet by mouth daily 30 tablet 5     tacrolimus (GENERIC EQUIVALENT) 1 MG capsule Take 6 capsules (6 mg) by mouth 2 times daily 360  capsule 3     urea (GORMEL) 20 % external cream Apply as a moisturizer to your whole body everyday. 480 g 5     valGANciclovir (VALCYTE) 450 MG tablet Take 2 tablets (900 mg) by mouth daily 69 tablet 0     ciprofloxacin (CIPRO) 500 MG tablet Take 1 tablet (500 mg) by mouth 2 times daily (Patient not taking: Reported on 5/22/2019) 6 tablet 0       No Known Allergies    Review of Systems:  -Constitutional: Otherwise feeling well today, in usual state of health.  -HEENT: Patient denies nonhealing oral sores.  -Skin: As above in HPI. No additional skin concerns.    Physical exam:  Vitals: /74 (BP Location: Right arm, Patient Position: Sitting, Cuff Size: Adult Regular)   Pulse (!) 49   GEN: This is a well developed, well-nourished male in no acute distress, in a pleasant mood.    SKIN: Doll phototype: IV   -there are thin brown papules on the shins   -No other lesions of concern on areas examined.       Impression/Plan:  1. Peripheral neuropathy in the setting of diabetes; also with concomitant diabetic dermopathy    Discussed the risks and benefits of continuing with previously prescribed metformin or starting gabapentin     Contacted Dr. Carballo to discuss whether gabapentin would be an appropriate treatment given his history    Continue metformin as previously prescribed    2. Hyperhidrosis of the soles of the feet: improved with topical aluminum chloride    Continue aluminum chloride 20% at bedtime      Follow-up in 3 months, earlier for new or changing lesions.       Staff Involved:  Scribe/Staff    Scribe Disclosure  I, Dominic Najjar, am serving as a scribe to document services personally performed by Dr. Rolly Smith MD, based on data collection and the provider's statements to me.    Provider Disclosure:   The documentation recorded by the scribe accurately reflects the services I personally performed and the decisions made by me.    Rolly Smith MD   of  Dermatology  Department of Dermatology  [facility]

## 2019-05-22 NOTE — NURSING NOTE
Dermatology Rooming Note    Loy Limon's goals for this visit include:   Chief Complaint   Patient presents with     Derm Problem     Skin sensitivity post transplant - Loy says bilateral hands and feet have been hot.     Chi Mason, CMA

## 2019-05-22 NOTE — TELEPHONE ENCOUNTER
Call to Johan to remind him that he needs to be doing labs regularly.  I also reminded him that on lab days he needs to hold morning dose of prograf until after the blood draw.  He was surprised that he needed to be doing this, was waiting for me to call him.   Blood sugars are high.  He tells me tht Dr. Berumen is unable to get these under control.  I also explained that a high prograf level can cause elevated blood sugars.  Encouraged not overeating, exercise.  Told him if blood sugar is high he will have to see Dr. Berumen for help with management.

## 2019-05-22 NOTE — TELEPHONE ENCOUNTER
Pt just had his Derm appointment this Am  With a  that was with him stated with the appointment they talked about his Neuropathy in hands and feet  Dr Carballo watching glucose and had elevated A1C  Is still on Metformin

## 2019-05-22 NOTE — LETTER
5/22/2019       RE: Loy Limon  [address]  [facility] [id number]     Dear Colleague,    Thank you for referring your patient, Loy Limon, to the [facility] DERMATOLOGY at [facility]. Please see a copy of my visit note below.    Formerly [facility] Dermatology Note      Dermatology Problem List:  1. Liver transplant 12/22/18 on tacrolimus and mycophenolate mofetil  2. Skin sensitivity/peripheral neuropathy post transplant  in setting of new diagnosis of diabetes (A1c 8.6%; up from 5.8% pre-transplant)  3. Hyperhidrosis of the soles of feet  - Current Rx: Aluminum chloride 20% at bedtime       Encounter Date: May 22, 2019    CC:  Chief Complaint   Patient presents with     Derm Problem     Skin sensitivity post transplant - Loy says bilateral hands and feet have been hot.         History of Present Illness:  Mr. Loy Limon is a 63 year old male who presents as a follow-up for skin sensitivity. The patient was last seen 2/20/19 when he started urea 20% topically and aluminum chloride 20%. Today, the patient reports that he continues to have discomfort and a burning sensation in his feet. He speculates whether this sensation could be due to his history of diabetes. He notes that his diabetes diagnosis was relatively recently, and he states that his blood sugar elevated markedly after his liver transplant due to shakes that they were having him drink. He notes that the blood sugar was elevated to nearly 400, and he was given insulin during this time to control this. After he eats, the discomfort becomes worse, and he speculates that this is due to his blood sugar fluctuating. He reports that the aluminum chloride has been providing noticeable relief of the sweating on his feet. He has also been noticing distention in his abdomen with an associated tingling sensation, and inquires whether this could be due to his transplant. The patient  voices no other concerns.    Past Medical History:   Patient Active Problem List   Diagnosis     Cirrhosis of liver (H)     Liver lesion     HCC (hepatocellular carcinoma) (H)     Liver transplant recipient (H)     Immunosuppressed status (H)     Hypervolemia     Atrial fibrillation with rapid ventricular response (H)     Hematuria     Bilateral wheezing     Hypoxia     Hyperglycemia     Pre-diabetes     Essential hypertension     Constipation     Biliary stricture of transplanted liver (H)     Drug-induced diabetes mellitus (H)     Muscle tension dysphonia     Past Medical History:   Diagnosis Date     Biliary stricture of transplanted liver (H) 2018     Cancer (H)     hepatocellualr carcinoma     Cirrhosis of liver (H) 2018     Enlarged prostate      Inguinal hernia     Repaired with mesh on 18     Liver lesion 2018     Liver transplanted (H) 2018     donor liver transplant     Portal vein thrombosis     on path explant     Postoperative atrial fibrillation (H) 2018     Pre-diabetes 2018     Past Surgical History:   Procedure Laterality Date     APPENDECTOMY       COLONOSCOPY           ENDOSCOPIC RETROGRADE CHOLANGIOPANCREATOGRAM N/A 2018    Procedure: ENDOSCOPIC RETROGRADE CHOLANGIOPANCREATOGRAM, with Billary Sphincterotomy and Billary Stent Placement;  Surgeon: Omero Lawler MD;  Location:  OR     ENDOSCOPIC RETROGRADE CHOLANGIOPANCREATOGRAM  2019    Procedure: COMBINED ENDOSCOPIC RETROGRADE CHOLANGIOPANCREATOGRAPHY, Bile Duct Stent Exchange;  Surgeon: Omero Lawler MD;  Location:  OR     ENDOSCOPIC RETROGRADE CHOLANGIOPANCREATOGRAM N/A 2019    Procedure: ENDOSCOPIC RETROGRADE CHOLANGIOPANCREATOGRAPHY, WITH BILIARY STENT REMOVAL AND STONE EXTRACTION;  Surgeon: Omero Lawler MD;  Location: [facility] OR     HERNIORRHAPHY INGUINAL  2018    Procedure: Herniorrhaphy inguinal;  Surgeon: Emil Echevarria MD;  Location:  [facility] OR     IR LIVER BIOPSY PERCUTANEOUS  2018     TRANSPLANT LIVER RECIPIENT  DONOR N/A 2018    Procedure: TRANSPLANT LIVER RECIPIENT  DONOR;  Surgeon: Emil Echevarria MD;  Location: [facility] OR       Social History:  Patient reports that he quit smoking about 14 months ago. His smoking use included cigarettes and cigars. He started smoking about 48 years ago. He has a 0.60 pack-year smoking history. He has never used smokeless tobacco. He reports that he does not drink alcohol or use drugs.    Family History:  Family History   Problem Relation Age of Onset     Liver Disease Brother      Skin Cancer No family hx of      Melanoma No family hx of        Medications:  Current Outpatient Medications   Medication Sig Dispense Refill     alfuzosin ER (UROXATRAL) 10 MG 24 hr tablet Take 1 tablet (10 mg) by mouth daily 90 tablet 3     amLODIPine (NORVASC) 10 MG tablet Take 1 tablet (10 mg) by mouth daily 30 tablet 11     aspirin (ASA) 325 MG EC tablet Take 1 tablet (325 mg) by mouth daily 30 tablet 11     blood glucose (NO BRAND SPECIFIED) lancets standard Use to test blood sugar 4 times daily or as directed. 200 each 11     blood glucose (ONETOUCH VERIO IQ) test strip Use to test blood sugar 4 times daily or as directed. 200 strip 11     glucose 40 % (400 mg/mL) GEL gel Take 15-30 g by mouth every 15 minutes as needed for low blood sugar 30 g 3     magnesium citrate solution Take 296 mLs by mouth daily 296 mL 0     metFORMIN (GLUCOPHAGE-XR) 500 MG 24 hr tablet Take 2 tablets (1,000 mg) by mouth daily (with dinner) 60 tablet 3     metoprolol tartrate 75 MG TABS Take 75 mg by mouth 2 times daily 60 tablet 11     multivitamin w/minerals (THERA-VIT-M) tablet Take 1 tablet by mouth daily 90 tablet 3     polyethylene glycol (MIRALAX/GLYCOLAX) packet Take 17 g by mouth daily as needed for constipation 7 packet 1     polyvinyl alcohol (LIQUIFILM TEARS) 1.4 % ophthalmic solution Place 1 drop into both  eyes as needed for dry eyes 15 mL 3     Sharps Container MISC 1 each daily 1 each 2     sulfamethoxazole-trimethoprim (BACTRIM/SEPTRA) 400-80 MG tablet Take 1 tablet by mouth daily 30 tablet 5     tacrolimus (GENERIC EQUIVALENT) 1 MG capsule Take 6 capsules (6 mg) by mouth 2 times daily 360 capsule 3     urea (GORMEL) 20 % external cream Apply as a moisturizer to your whole body everyday. 480 g 5     valGANciclovir (VALCYTE) 450 MG tablet Take 2 tablets (900 mg) by mouth daily 69 tablet 0     ciprofloxacin (CIPRO) 500 MG tablet Take 1 tablet (500 mg) by mouth 2 times daily (Patient not taking: Reported on 5/22/2019) 6 tablet 0       No Known Allergies    Review of Systems:  -Constitutional: Otherwise feeling well today, in usual state of health.  -HEENT: Patient denies nonhealing oral sores.  -Skin: As above in HPI. No additional skin concerns.    Physical exam:  Vitals: /74 (BP Location: Right arm, Patient Position: Sitting, Cuff Size: Adult Regular)   Pulse (!) 49   GEN: This is a well developed, well-nourished male in no acute distress, in a pleasant mood.    SKIN: Doll phototype: IV   -there are thin brown papules on the shins   -No other lesions of concern on areas examined.       Impression/Plan:  1. Peripheral neuropathy in the setting of diabetes; also with concomitant diabetic dermopathy    Discussed the risks and benefits of continuing with previously prescribed metformin or starting gabapentin     Contacted Dr. Carballo to discuss whether gabapentin would be an appropriate treatment given his history    Continue metformin as previously prescribed    2. Hyperhidrosis of the soles of the feet: improved with topical aluminum chloride    Continue aluminum chloride 20% at bedtime      Follow-up in 3 months, earlier for new or changing lesions.       Staff Involved:  Scribe/Staff    Scribe Disclosure  I, Dominic Najjar, am serving as a scribe to document services personally performed by Dr. Lofton  MD Sarah, based on data collection and the provider's statements to me.    Provider Disclosure:   The documentation recorded by the scribe accurately reflects the services I personally performed and the decisions made by me.    Rolly Smith MD   of Dermatology  Department of Dermatology  [employer]

## 2019-05-23 ENCOUNTER — DOCUMENTATION ONLY (OUTPATIENT)
Dept: TRANSPLANT | Facility: CLINIC | Age: 64
End: 2019-05-23

## 2019-05-23 DIAGNOSIS — R73.9 STEROID-INDUCED HYPERGLYCEMIA: ICD-10-CM

## 2019-05-23 DIAGNOSIS — T38.0X5A STEROID-INDUCED HYPERGLYCEMIA: ICD-10-CM

## 2019-05-23 DIAGNOSIS — Z94.4 LIVER REPLACED BY TRANSPLANT (H): ICD-10-CM

## 2019-05-23 DIAGNOSIS — Z94.4 LIVER TRANSPLANT RECIPIENT (H): ICD-10-CM

## 2019-05-23 LAB
ALBUMIN SERPL-MCNC: 3.8 G/DL (ref 3.4–5)
ALP SERPL-CCNC: 167 U/L (ref 40–150)
ALT SERPL W P-5'-P-CCNC: 17 U/L (ref 0–70)
ANION GAP SERPL CALCULATED.3IONS-SCNC: 5 MMOL/L (ref 3–14)
AST SERPL W P-5'-P-CCNC: 8 U/L (ref 0–45)
BILIRUB DIRECT SERPL-MCNC: 0.2 MG/DL (ref 0–0.2)
BILIRUB SERPL-MCNC: 0.7 MG/DL (ref 0.2–1.3)
BUN SERPL-MCNC: 20 MG/DL (ref 7–30)
CALCIUM SERPL-MCNC: 9 MG/DL (ref 8.5–10.1)
CHLORIDE SERPL-SCNC: 110 MMOL/L (ref 94–109)
CO2 SERPL-SCNC: 23 MMOL/L (ref 20–32)
CREAT SERPL-MCNC: 0.8 MG/DL (ref 0.66–1.25)
ERYTHROCYTE [DISTWIDTH] IN BLOOD BY AUTOMATED COUNT: 12.9 % (ref 10–15)
GFR SERPL CREATININE-BSD FRML MDRD: >90 ML/MIN/{1.73_M2}
GLUCOSE SERPL-MCNC: 159 MG/DL (ref 70–99)
HBA1C MFR BLD: 8.5 % (ref 0–5.6)
HCT VFR BLD AUTO: 33.2 % (ref 40–53)
HGB BLD-MCNC: 11 G/DL (ref 13.3–17.7)
MAGNESIUM SERPL-MCNC: 1.8 MG/DL (ref 1.6–2.3)
MCH RBC QN AUTO: 28.4 PG (ref 26.5–33)
MCHC RBC AUTO-ENTMCNC: 33.1 G/DL (ref 31.5–36.5)
MCV RBC AUTO: 86 FL (ref 78–100)
PHOSPHATE SERPL-MCNC: 3.4 MG/DL (ref 2.5–4.5)
PLATELET # BLD AUTO: 142 10E9/L (ref 150–450)
POTASSIUM SERPL-SCNC: 5.3 MMOL/L (ref 3.4–5.3)
PROT SERPL-MCNC: 6.7 G/DL (ref 6.8–8.8)
RBC # BLD AUTO: 3.88 10E12/L (ref 4.4–5.9)
SODIUM SERPL-SCNC: 139 MMOL/L (ref 133–144)
TACROLIMUS BLD-MCNC: 11.6 UG/L (ref 5–15)
TME LAST DOSE: NORMAL H
WBC # BLD AUTO: 3.7 10E9/L (ref 4–11)

## 2019-05-23 NOTE — TELEPHONE ENCOUNTER
Tacrolimus level 11.6.    Please instruct patient to decrease tacrolimus dose to 5 mg in the morning and 5 mg in the evening.

## 2019-05-23 NOTE — TELEPHONE ENCOUNTER
Instructed pt through  [facility] to reduce the tacro to 5mg every 12 hours. Pt able to repeat instructions. New script sent.

## 2019-05-24 RX ORDER — TACROLIMUS 1 MG/1
5 CAPSULE ORAL EVERY 12 HOURS
Qty: 300 CAPSULE | Refills: 11 | Status: SHIPPED | OUTPATIENT
Start: 2019-05-24 | End: 2019-06-26

## 2019-05-28 DIAGNOSIS — G62.9 PERIPHERAL POLYNEUROPATHY: Primary | ICD-10-CM

## 2019-05-28 RX ORDER — GABAPENTIN 100 MG/1
100 CAPSULE ORAL 3 TIMES DAILY
Qty: 90 CAPSULE | Refills: 3 | Status: ON HOLD | OUTPATIENT
Start: 2019-05-28 | End: 2020-01-03

## 2019-06-04 NOTE — PROGRESS NOTES
[facility]  Primary Care Center   Jaswinder Anne MD  06/05/2019      Chief Complaint:   Diabetes       History of Present Illness:   Loy Limon is a 64 year old male with a history of hepatocellular carcinoma s/p liver transplant with biliary stricture post-transplant, immunosuppressed by Tacrolimus, pre-diabetes, and enlarged prostate who presents for evaluation of diabetes and abdominal discomfort. He reports that everything is going well.    Abdominal Discomfort:  The patient reports a burning or numbness sensation of the skin on his stomach, around and below his scar. He endorses that this discomfort has improved slightly since his last visit. He is having regular bowel movements, and denies any nausea or vomiting. He describes that the pain is on top of his stomach rather than inside. He is worried about this pain when he goes back to work, but he doesn't know exactly when he will be returning. 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 He has an upcoming appointment with Dr. Carballo on 6/26/19, and I instructed him to bring up his concerns then if he has any further questions.    Diaibetes:  He has been unable to get his blood sugar down, with a fasting glucose consistently at 145 in the morning. He is currently taking Metformin 1000 mg per day. He typically takes his medications at 8AM and 8PM. He reports that he is eating well, but most likely eating too much. We discussed walking regularly to help manage his diabetes, and he mentioned that he is currently walking everyday at the park or mall.     ENT:  Per ENT note, his swallow study results showed the oral mechanism to be unremarkable but the oral phase of swallow was to be slowed and executed with reduced anterior posterior movement.  Oral residue observed with smaller bolus presentations and some intentional bolus hold with larger  presentation.    Review of Systems:   Pertinent items are noted in HPI, remainder of complete ROS is negative.      Active Medications:     Current Outpatient Medications:      alfuzosin ER (UROXATRAL) 10 MG 24 hr tablet, Take 1 tablet (10 mg) by mouth daily, Disp: 90 tablet, Rfl: 3     amLODIPine (NORVASC) 10 MG tablet, Take 1 tablet (10 mg) by mouth daily, Disp: 30 tablet, Rfl: 11     aspirin (ASA) 325 MG EC tablet, Take 1 tablet (325 mg) by mouth daily, Disp: 30 tablet, Rfl: 11     blood glucose (NO BRAND SPECIFIED) lancets standard, Use to test blood sugar 4 times daily or as directed., Disp: 200 each, Rfl: 11     blood glucose (ONETOUCH VERIO IQ) test strip, Use to test blood sugar 4 times daily or as directed., Disp: 200 strip, Rfl: 11     gabapentin (NEURONTIN) 100 MG capsule, Take 1 capsule (100 mg) by mouth 3 times daily Start 100 mg at bedtime for 1-2 weeks, then 2x daily for 1-2 weeks, then 3x daily thereafter., Disp: 90 capsule, Rfl: 3     glucose 40 % (400 mg/mL) GEL gel, Take 15-30 g by mouth every 15 minutes as needed for low blood sugar, Disp: 30 g, Rfl: 3     magnesium citrate solution, Take 296 mLs by mouth daily, Disp: 296 mL, Rfl: 0     metFORMIN (GLUCOPHAGE-XR) 500 MG 24 hr tablet, Take 4 tablets (2,000 mg) by mouth daily (with dinner), Disp: 120 tablet, Rfl: 3     metoprolol tartrate 75 MG TABS, Take 75 mg by mouth 2 times daily, Disp: 60 tablet, Rfl: 11     multivitamin w/minerals (THERA-VIT-M) tablet, Take 1 tablet by mouth daily, Disp: 90 tablet, Rfl: 3     polyethylene glycol (MIRALAX/GLYCOLAX) packet, Take 17 g by mouth daily as needed for constipation, Disp: 7 packet, Rfl: 1     polyvinyl alcohol (LIQUIFILM TEARS) 1.4 % ophthalmic solution, Place 1 drop into both eyes as needed for dry eyes, Disp: 15 mL, Rfl: 3     Sharps Container MISC, 1 each daily, Disp: 1 each, Rfl: 2     sulfamethoxazole-trimethoprim (BACTRIM/SEPTRA) 400-80 MG tablet, Take 1 tablet by mouth daily, Disp: 30 tablet,  Rfl: 5     tacrolimus (GENERIC EQUIVALENT) 1 MG capsule, Take 5 capsules (5 mg) by mouth every 12 hours, Disp: 300 capsule, Rfl: 11     urea (GORMEL) 20 % external cream, Apply as a moisturizer to your whole body everyday., Disp: 480 g, Rfl: 5     valGANciclovir (VALCYTE) 450 MG tablet, Take 2 tablets (900 mg) by mouth daily, Disp: 69 tablet, Rfl: 0      Allergies:   Patient has no known allergies.      Past Medical History:  Biliary stricture of transplanted liver  Hepatocellular carcinoma  Cirrhosis of liver  Enlarged prostate  Inguinal hernias  Portal vein thrombosis  Post-op atrial fibrillation  Prediabetes     Past Surgical History:  Appendectomy  Colonoscopy  Endoscopic retrograde cholangiopancreatography x3  Herniorrhaphy  Transplant liver  Liver biopsy    Family History:   Brother: liver disease     Social History:     Former 0.03 PPD smoker for 20 years, quit 2018    Physical Exam:   /64 (BP Location: Right arm, Patient Position: Sitting, Cuff Size: Adult Regular)   Pulse (!) 48   Temp 98.4  F (36.9  C) (Oral)   Resp 16   Wt 79.8 kg (176 lb)   BMI 27.57 kg/m     Constitutional: Alert. In no distress.  Head: The scalp, face, and head appear normal.  Musculoskeletal: Extremities appear normal. No gross deformities noted.   Neurologic: Gait normal. Speech is normal and fluent.  Psychiatric: Mentation appears normal. Normal affect.      Assessment and Plan:  DM type 2, goal HbA1c 7%-8% (H)  Double Metformin as his fasting morning sugars are still about 140. I encouraged him to walk a lot and will see him again in September to recheck his blood sugar. We discussed a goal of fasting glucose at 110-120.  - metFORMIN (GLUCOPHAGE-XR) 500 MG 24 hr tablet  Dispense: 120 tablet; Refill: 3    Abdominal wall discomfort   About the same to slightly better. I encouraged to discuss with GI later this month, but I believe it is from nerve disruption due to surgery and told him that it should improve over  roughly one year.     Follow-up: Return in about 3 months (around 9/5/2019).      Scribe Disclosure:  We, Prabhu Estrada and Kamla Newberry, are serving as scribes to document services personally performed by Jaswinder Anne MD at this visit, based upon the provider's statements to us. All documentation has been reviewed by the aforementioned provider prior to being entered into the official medical record.     Portions of this medical record were completed by a scribe. UPON MY REVIEW AND AUTHENTICATION BY ELECTRONIC SIGNATURE, this confirms (a) I performed the applicable clinical services, and (b) the record is accurate.   Jaswinder Anne MD

## 2019-06-05 ENCOUNTER — OFFICE VISIT (OUTPATIENT)
Dept: FAMILY MEDICINE | Facility: CLINIC | Age: 64
End: 2019-06-05
Payer: COMMERCIAL

## 2019-06-05 VITALS
TEMPERATURE: 98.4 F | DIASTOLIC BLOOD PRESSURE: 64 MMHG | WEIGHT: 176 LBS | HEART RATE: 48 BPM | BODY MASS INDEX: 27.57 KG/M2 | RESPIRATION RATE: 16 BRPM | SYSTOLIC BLOOD PRESSURE: 119 MMHG

## 2019-06-05 DIAGNOSIS — E11.9 DM TYPE 2, GOAL HBA1C 7%-8% (H): ICD-10-CM

## 2019-06-05 RX ORDER — METFORMIN HCL 500 MG
2000 TABLET, EXTENDED RELEASE 24 HR ORAL
Qty: 120 TABLET | Refills: 3 | Status: SHIPPED | OUTPATIENT
Start: 2019-06-05 | End: 2019-11-07

## 2019-06-05 ASSESSMENT — PAIN SCALES - GENERAL: PAINLEVEL: MODERATE PAIN (4)

## 2019-06-07 ENCOUNTER — TELEPHONE (OUTPATIENT)
Dept: TRANSPLANT | Facility: CLINIC | Age: 64
End: 2019-06-07

## 2019-06-07 PROBLEM — K70.30: Status: ACTIVE | Noted: 2019-06-07

## 2019-06-07 NOTE — TELEPHONE ENCOUNTER
----- Message from Jeny Szymanski RN sent at 6/7/2019  1:12 PM CDT -----  Regarding: FW: Ethyl glucuronide  Please fax a letter to this insurance co stating that this testing is standard of care for post liver transplant patients w/ diagnosis of Laennec cirrhosis.  Thanks,  Jeny  ----- Message -----  From: Emil Echevarria MD  Sent: 6/5/2019   9:06 AM  To: Jeny Szymanski RN, Shelly Torres  Subject: RE: Ethyl glucuronide                            Please let the insurance know that this is our protocol for post transplant management after Laënnec cirrhosis as initial diagnosis  ----- Message -----  From: Shelly Torres  Sent: 6/3/2019   1:25 PM  To: Emil Echevarria MD  Subject: Ethyl glucuronide                                Ciaran Echevarria,    Our lab sent this test on Limon to [facility] on 03/19/19. We got a fax from them saying that the insurance requires medical necessity documentation for this test. The diagnosis provided was Z94.4 Liver Transplant, which was not sufficient.    They would like a new diagnosis that supports medical necessity or a copy of the Advance Beneficiary Notice (ABN) signed by the patient prior to getting this test done.    Please call them at [phone], ext. [phone] or fax them the ABN at [phone]. You could also let me know and I could give the necessary information.    Going forward, it would be great to have ABN signed on Medicare patients for this test.    Thank you for your time,  [facility].

## 2019-06-18 ENCOUNTER — TELEPHONE (OUTPATIENT)
Dept: TRANSPLANT | Facility: CLINIC | Age: 64
End: 2019-06-18

## 2019-06-18 NOTE — TELEPHONE ENCOUNTER
Received a call with pt and a - He stated the return to work letter needs a date to return to work included

## 2019-06-18 NOTE — LETTER
2019    Loy Limon  [address]   [facility] [address]          To:    Whom It May Concern        Re: Loy Limon  : 1955       Ashley Limon had a liver transplant on 2018. He is doing well and we are authorizing him to return to work up to 6 hours per day on 2019. He has a 10 pound lifting restriction with minimal repetitive bending. He also needs to be excused from work at 8:00am to 10:00am monthly and as needed for additional blood draws and for all appointments with physicians as scheduled.     If you have any questions, please contact Jeny Szymanski RN @ [phone].        Sincerely,      Emil Echevarria MD, JOSÉ LUIS  Professor of Surgery  [employer]  Surgical Director of Liver Transplant Program  Executive Medical Director of Pediatric Transplantation

## 2019-06-19 DIAGNOSIS — Z94.4 LIVER TRANSPLANTED (H): ICD-10-CM

## 2019-06-19 DIAGNOSIS — Z94.4 LIVER REPLACED BY TRANSPLANT (H): ICD-10-CM

## 2019-06-19 LAB
ALBUMIN SERPL-MCNC: 3.7 G/DL (ref 3.4–5)
ALP SERPL-CCNC: 171 U/L (ref 40–150)
ALT SERPL W P-5'-P-CCNC: 19 U/L (ref 0–70)
ANION GAP SERPL CALCULATED.3IONS-SCNC: 4 MMOL/L (ref 3–14)
AST SERPL W P-5'-P-CCNC: 8 U/L (ref 0–45)
BILIRUB DIRECT SERPL-MCNC: 0.2 MG/DL (ref 0–0.2)
BILIRUB SERPL-MCNC: 0.8 MG/DL (ref 0.2–1.3)
BUN SERPL-MCNC: 18 MG/DL (ref 7–30)
CALCIUM SERPL-MCNC: 8.5 MG/DL (ref 8.5–10.1)
CHLORIDE SERPL-SCNC: 109 MMOL/L (ref 94–109)
CO2 SERPL-SCNC: 25 MMOL/L (ref 20–32)
CREAT SERPL-MCNC: 0.76 MG/DL (ref 0.66–1.25)
ERYTHROCYTE [DISTWIDTH] IN BLOOD BY AUTOMATED COUNT: 13 % (ref 10–15)
GFR SERPL CREATININE-BSD FRML MDRD: >90 ML/MIN/{1.73_M2}
GLUCOSE SERPL-MCNC: 163 MG/DL (ref 70–99)
HCT VFR BLD AUTO: 35.1 % (ref 40–53)
HGB BLD-MCNC: 11.4 G/DL (ref 13.3–17.7)
MAGNESIUM SERPL-MCNC: 1.9 MG/DL (ref 1.6–2.3)
MCH RBC QN AUTO: 28.6 PG (ref 26.5–33)
MCHC RBC AUTO-ENTMCNC: 32.5 G/DL (ref 31.5–36.5)
MCV RBC AUTO: 88 FL (ref 78–100)
PHOSPHATE SERPL-MCNC: 3.5 MG/DL (ref 2.5–4.5)
PLATELET # BLD AUTO: 161 10E9/L (ref 150–450)
POTASSIUM SERPL-SCNC: 5.1 MMOL/L (ref 3.4–5.3)
PROT SERPL-MCNC: 6.8 G/DL (ref 6.8–8.8)
RBC # BLD AUTO: 3.98 10E12/L (ref 4.4–5.9)
SODIUM SERPL-SCNC: 138 MMOL/L (ref 133–144)
TACROLIMUS BLD-MCNC: 7.5 UG/L (ref 5–15)
TME LAST DOSE: 2000 H
WBC # BLD AUTO: 3.4 10E9/L (ref 4–11)

## 2019-06-20 LAB — ETHYL GLUCURONIDE UR QL: NEGATIVE

## 2019-06-20 NOTE — TELEPHONE ENCOUNTER
Spoke to pt with interpretor Ely from . Pt needs to have a return to work date on the letter he received from our office. Date added and resent to pt.

## 2019-06-26 ENCOUNTER — OFFICE VISIT (OUTPATIENT)
Dept: GASTROENTEROLOGY | Facility: CLINIC | Age: 64
End: 2019-06-26
Attending: INTERNAL MEDICINE
Payer: COMMERCIAL

## 2019-06-26 ENCOUNTER — TELEPHONE (OUTPATIENT)
Dept: TRANSPLANT | Facility: CLINIC | Age: 64
End: 2019-06-26

## 2019-06-26 VITALS
OXYGEN SATURATION: 97 % | BODY MASS INDEX: 27.94 KG/M2 | WEIGHT: 178.4 LBS | HEART RATE: 63 BPM | SYSTOLIC BLOOD PRESSURE: 154 MMHG | TEMPERATURE: 97.9 F | DIASTOLIC BLOOD PRESSURE: 81 MMHG

## 2019-06-26 DIAGNOSIS — E11.00 TYPE 2 DIABETES MELLITUS WITH HYPEROSMOLARITY WITHOUT COMA, WITHOUT LONG-TERM CURRENT USE OF INSULIN (H): Primary | ICD-10-CM

## 2019-06-26 DIAGNOSIS — Z94.4 LIVER TRANSPLANT RECIPIENT (H): ICD-10-CM

## 2019-06-26 DIAGNOSIS — Z94.4 LIVER REPLACED BY TRANSPLANT (H): ICD-10-CM

## 2019-06-26 LAB
ALBUMIN SERPL-MCNC: 3.9 G/DL (ref 3.4–5)
ALP SERPL-CCNC: 189 U/L (ref 40–150)
ALT SERPL W P-5'-P-CCNC: 23 U/L (ref 0–70)
ANION GAP SERPL CALCULATED.3IONS-SCNC: 4 MMOL/L (ref 3–14)
AST SERPL W P-5'-P-CCNC: 11 U/L (ref 0–45)
BILIRUB DIRECT SERPL-MCNC: 0.2 MG/DL (ref 0–0.2)
BILIRUB SERPL-MCNC: 0.6 MG/DL (ref 0.2–1.3)
BUN SERPL-MCNC: 26 MG/DL (ref 7–30)
CALCIUM SERPL-MCNC: 8.5 MG/DL (ref 8.5–10.1)
CHLORIDE SERPL-SCNC: 109 MMOL/L (ref 94–109)
CO2 SERPL-SCNC: 25 MMOL/L (ref 20–32)
CREAT SERPL-MCNC: 0.82 MG/DL (ref 0.66–1.25)
ERYTHROCYTE [DISTWIDTH] IN BLOOD BY AUTOMATED COUNT: 13 % (ref 10–15)
GFR SERPL CREATININE-BSD FRML MDRD: >90 ML/MIN/{1.73_M2}
GLUCOSE SERPL-MCNC: 192 MG/DL (ref 70–99)
HCT VFR BLD AUTO: 35.7 % (ref 40–53)
HGB BLD-MCNC: 11.9 G/DL (ref 13.3–17.7)
MAGNESIUM SERPL-MCNC: 2.3 MG/DL (ref 1.6–2.3)
MCH RBC QN AUTO: 29.1 PG (ref 26.5–33)
MCHC RBC AUTO-ENTMCNC: 33.3 G/DL (ref 31.5–36.5)
MCV RBC AUTO: 87 FL (ref 78–100)
PHOSPHATE SERPL-MCNC: 4 MG/DL (ref 2.5–4.5)
PLATELET # BLD AUTO: 159 10E9/L (ref 150–450)
POTASSIUM SERPL-SCNC: 4.8 MMOL/L (ref 3.4–5.3)
PROT SERPL-MCNC: 7.1 G/DL (ref 6.8–8.8)
RBC # BLD AUTO: 4.09 10E12/L (ref 4.4–5.9)
SODIUM SERPL-SCNC: 138 MMOL/L (ref 133–144)
TACROLIMUS BLD-MCNC: 9.9 UG/L (ref 5–15)
TME LAST DOSE: 2000 H
WBC # BLD AUTO: 4.1 10E9/L (ref 4–11)

## 2019-06-26 PROCEDURE — 36415 COLL VENOUS BLD VENIPUNCTURE: CPT | Performed by: TRANSPLANT SURGERY

## 2019-06-26 PROCEDURE — 80197 ASSAY OF TACROLIMUS: CPT | Performed by: TRANSPLANT SURGERY

## 2019-06-26 PROCEDURE — 84100 ASSAY OF PHOSPHORUS: CPT | Performed by: TRANSPLANT SURGERY

## 2019-06-26 PROCEDURE — 85027 COMPLETE CBC AUTOMATED: CPT | Performed by: TRANSPLANT SURGERY

## 2019-06-26 PROCEDURE — 80048 BASIC METABOLIC PNL TOTAL CA: CPT | Performed by: TRANSPLANT SURGERY

## 2019-06-26 PROCEDURE — 80076 HEPATIC FUNCTION PANEL: CPT | Performed by: TRANSPLANT SURGERY

## 2019-06-26 PROCEDURE — 83735 ASSAY OF MAGNESIUM: CPT | Performed by: TRANSPLANT SURGERY

## 2019-06-26 PROCEDURE — G0463 HOSPITAL OUTPT CLINIC VISIT: HCPCS | Mod: ZF

## 2019-06-26 RX ORDER — URSODIOL 250 MG/1
250 TABLET, FILM COATED ORAL 2 TIMES DAILY
COMMUNITY
End: 2019-12-16

## 2019-06-26 RX ORDER — TACROLIMUS 1 MG/1
4 CAPSULE ORAL EVERY 12 HOURS
Qty: 240 CAPSULE | Refills: 11 | Status: SHIPPED | OUTPATIENT
Start: 2019-06-26 | End: 2019-07-11

## 2019-06-26 ASSESSMENT — PAIN SCALES - GENERAL: PAINLEVEL: NO PAIN (0)

## 2019-06-26 NOTE — PROGRESS NOTES
[facility]    Hepatology follow-up    CHIEF COMPLAINT/REASON FOR THE VISIT:  Status post liver transplantation.      SUBJECTIVE:  Mr. Limon is a 64-year-old male who I saw initially for transplant evaluation for hepatocellular carcinoma in the context of alcohol and the diagnosis of hepatocellular carcinoma was made on 2018.  This was in the context of cirrhosis related with alcohol.  He did have it evaluated for transplantation and was listed on 2018.  He then had a microwave ablation and after he got the liver transplantation his explant showed there was the presence of viable tumor, although so far he does not show any signs of recurrence.  Please note that he had also some strictures which were managed with ERCP by our interventional endoscopy.  Today he is complaining of some scar discomfort.  He did have some dysphagia that was evaluated by ENT and the patient is doing quite well.  He has some bloating.  He eats a lot of dairy products including cheese, although he is not associated with that, but he has gained some weight, like 8 pounds since we last saw him.  He denies any shortness of breath, chest pain.  He has seen Dermatology.  Otherwise, he has an enlarged prostate and is followed by Urology, and he was last seen on  and this was thought to be benign prostatic hypertrophy.     Medical hx Surgical hx   Past Medical History:   Diagnosis Date     Biliary stricture of transplanted liver (H) 2018     Cancer (H)     hepatocellualr carcinoma     Cirrhosis of liver (H) 2018     Enlarged prostate      Inguinal hernia     Repaired with mesh on 18     Liver lesion 2018     Liver transplanted (H) 2018     donor liver transplant     Portal vein thrombosis     on path explant     Postoperative atrial fibrillation (H) 2018     Pre-diabetes 2018      Past Surgical History:   Procedure Laterality Date     APPENDECTOMY        COLONOSCOPY           ENDOSCOPIC RETROGRADE CHOLANGIOPANCREATOGRAM N/A 2018    Procedure: ENDOSCOPIC RETROGRADE CHOLANGIOPANCREATOGRAM, with Billary Sphincterotomy and Billary Stent Placement;  Surgeon: Omero Lawler MD;  [facility]     ENDOSCOPIC RETROGRADE CHOLANGIOPANCREATOGRAM  2019    Procedure: COMBINED ENDOSCOPIC RETROGRADE CHOLANGIOPANCREATOGRAPHY, Bile Duct Stent Exchange;  Surgeon: Omero Lawler MD;  [facility]     ENDOSCOPIC RETROGRADE CHOLANGIOPANCREATOGRAM N/A 2019    Procedure: ENDOSCOPIC RETROGRADE CHOLANGIOPANCREATOGRAPHY, WITH BILIARY STENT REMOVAL AND STONE EXTRACTION;  Surgeon: Omero Lawler MD;  [facility]     HERNIORRHAPHY INGUINAL  2018    Procedure: Herniorrhaphy inguinal;  Surgeon: Emil Echevarria MD;  [facility]     IR LIVER BIOPSY PERCUTANEOUS  2018     TRANSPLANT LIVER RECIPIENT  DONOR N/A 2018    Procedure: TRANSPLANT LIVER RECIPIENT  DONOR;  Surgeon: Emil Echevarria MD;  [facility]          Medications  Prior to Admission medications    Medication Sig Start Date End Date Taking? Authorizing Provider   alfuzosin ER (UROXATRAL) 10 MG 24 hr tablet Take 1 tablet (10 mg) by mouth daily 19  Yes Jaswinder Anne MD   amLODIPine (NORVASC) 10 MG tablet Take 1 tablet (10 mg) by mouth daily 19  Yes Inez Baker APRN CNP   aspirin (ASA) 325 MG EC tablet Take 1 tablet (325 mg) by mouth daily 19  Yes Emil Echevarria MD   blood glucose (NO BRAND SPECIFIED) lancets standard Use to test blood sugar 4 times daily or as directed. 19 Yes Inez Baker APRN CNP   blood glucose (ONETOUCH VERIO IQ) test strip Use to test blood sugar 4 times daily or as directed. 19 Yes Inez Baker APRN CNP   metFORMIN (GLUCOPHAGE-XR) 500 MG 24 hr tablet Take 4 tablets (2,000 mg) by mouth daily (with dinner) 19  Yes Jaswinder Anne  MD   metoprolol tartrate 75 MG TABS Take 75 mg by mouth 2 times daily 1/7/19  Yes Inez Baker APRN CNP   multivitamin w/minerals (THERA-VIT-M) tablet Take 1 tablet by mouth daily 1/24/19  Yes Emil Echevarria MD   Sharps Container MISC 1 each daily 1/7/19  Yes Inez Baker APRN CNP   tacrolimus (GENERIC EQUIVALENT) 1 MG capsule Take 5 capsules (5 mg) by mouth every 12 hours 5/24/19  Yes Emil Echevarria MD   ursodiol (ACTIGALL) 250 MG tablet Take 250 mg by mouth 2 times daily   Yes Reported, Patient   gabapentin (NEURONTIN) 100 MG capsule Take 1 capsule (100 mg) by mouth 3 times daily Start 100 mg at bedtime for 1-2 weeks, then 2x daily for 1-2 weeks, then 3x daily thereafter.  Patient not taking: Reported on 6/26/2019 5/28/19   Rolly Smith MD   glucose 40 % (400 mg/mL) GEL gel Take 15-30 g by mouth every 15 minutes as needed for low blood sugar  Patient not taking: Reported on 6/26/2019 1/7/19   Inez Baker APRN CNP   magnesium citrate solution Take 296 mLs by mouth daily  Patient not taking: Reported on 6/26/2019 3/4/19   Emil Echevarria MD   urea (GORMEL) 20 % external cream Apply as a moisturizer to your whole body everyday.  Patient not taking: Reported on 6/26/2019 2/20/19   Rolly Smith MD       Allergies  No Known Allergies    Review of systems  A 10-point review of systems was negative    Examination  /81   Pulse 63   Temp 97.9  F (36.6  C)   Wt 80.9 kg (178 lb 6.4 oz)   SpO2 97%   BMI 27.94 kg/m    Body mass index is 27.94 kg/m .    Gen- well, NAD, A+Ox3, normal color  Lym- no palpable LAD  CVS- RRR  RS- CTA  Abd- Healed surgical scars.  Extr- hands normal, no OSKAR  Skin- no rash or jaundice  Neuro- no asterixis  Psych- normal mood    Laboratory  Lab Results   Component Value Date     06/26/2019    POTASSIUM 4.8 06/26/2019    CHLORIDE 109 06/26/2019    CO2 25 06/26/2019    BUN 26 06/26/2019    CR 0.82 06/26/2019       Lab Results    Component Value Date    BILITOTAL 0.6 06/26/2019    ALT 23 06/26/2019    AST 11 06/26/2019    ALKPHOS 189 06/26/2019       Lab Results   Component Value Date    ALBUMIN 3.9 06/26/2019    PROTTOTAL 7.1 06/26/2019        Lab Results   Component Value Date    WBC 4.1 06/26/2019    HGB 11.9 06/26/2019    MCV 87 06/26/2019     06/26/2019       Lab Results   Component Value Date    INR 1.14 12/31/2018       Radiology    ASSESSMENT AND PLAN:  Status post liver transplantation.  Mr. Limon had received a cadaveric liver transplantation on 12/22/2018.  He is doing quite well regarding that.  No signs of recurrence of his HCC so far.  He is going to follow with Oncology.  He is taking his immunosuppression and he is very comfortable with that.  He was still taking Bactrim, which we will discontinue today.  Otherwise, he will be seen by Dermatology yearly and he will need to follow Urology for his benign prostatic hypertrophy.  For all his other medical issues, he will follow with the primary care team and he will be seen here in 6 months.      This was a 25-minute visit, of which more than 50% was spent in explaining to the patient what our plan of care was.  We answered all his questions.      cc:  Primary care physician       Tamela Carballo MD  Hepatology  [facility]

## 2019-06-26 NOTE — TELEPHONE ENCOUNTER
"Upon leaving the clinic, Claribel requested a letter for his work stating that his only restriction is \"no lifting >30#\".  He also told me that he will have no insurance as of 8/1.  I gave him Peyton Barrios's phone number, asked him to give her a call.  He agreed to do this.  "

## 2019-06-26 NOTE — LETTER
6/26/2019       RE: Loy Limon  [address]  [facility] [address]     Dear Colleague,    Thank you for referring your patient, Loy Limon, to the [facility] HEPATOLOGY at [facility]. Please see a copy of my visit note below.    [facility]    Hepatology follow-up    CHIEF COMPLAINT/REASON FOR THE VISIT:  Status post liver transplantation.      SUBJECTIVE:  Mr. Limon is a 64-year-old male who I saw initially for transplant evaluation for hepatocellular carcinoma in the context of alcohol and the diagnosis of hepatocellular carcinoma was made on 03/2018.  This was in the context of cirrhosis related with alcohol.  He did have it evaluated for transplantation and was listed on 06/13/2018.  He then had a microwave ablation and after he got the liver transplantation his explant showed there was the presence of viable tumor, although so far he does not show any signs of recurrence.  Please note that he had also some strictures which were managed with ERCP by our interventional endoscopy.  Today he is complaining of some scar discomfort.  He did have some dysphagia that was evaluated by ENT and the patient is doing quite well.  He has some bloating.  He eats a lot of dairy products including cheese, although he is not associated with that, but he has gained some weight, like 8 pounds since we last saw him.  He denies any shortness of breath, chest pain.  He has seen Dermatology.  Otherwise, he has an enlarged prostate and is followed by Urology, and he was last seen on 05/03 and this was thought to be benign prostatic hypertrophy.     Medical hx Surgical hx   Past Medical History:   Diagnosis Date     Biliary stricture of transplanted liver (H) 12/28/2018     Cancer (H)     hepatocellualr carcinoma     Cirrhosis of liver (H) 5/8/2018     Enlarged prostate      Inguinal hernia     Repaired with mesh on 12/22/18     Liver lesion  2018     Liver transplanted (H) 2018     donor liver transplant     Portal vein thrombosis     on path explant     Postoperative atrial fibrillation (H) 2018     Pre-diabetes 2018      Past Surgical History:   Procedure Laterality Date     APPENDECTOMY       COLONOSCOPY           ENDOSCOPIC RETROGRADE CHOLANGIOPANCREATOGRAM N/A 2018    Procedure: ENDOSCOPIC RETROGRADE CHOLANGIOPANCREATOGRAM, with Billary Sphincterotomy and Billary Stent Placement;  Surgeon: Omero Lawler MD;  [facility]     ENDOSCOPIC RETROGRADE CHOLANGIOPANCREATOGRAM  2019    Procedure: COMBINED ENDOSCOPIC RETROGRADE CHOLANGIOPANCREATOGRAPHY, Bile Duct Stent Exchange;  Surgeon: Omero Lawler MD;  [facility]     ENDOSCOPIC RETROGRADE CHOLANGIOPANCREATOGRAM N/A 2019    Procedure: ENDOSCOPIC RETROGRADE CHOLANGIOPANCREATOGRAPHY, WITH BILIARY STENT REMOVAL AND STONE EXTRACTION;  Surgeon: Omero Lawler MD;  [facility]     HERNIORRHAPHY INGUINAL  2018    Procedure: Herniorrhaphy inguinal;  Surgeon: Emil Echevarria MD;  [facility]     IR LIVER BIOPSY PERCUTANEOUS  2018     TRANSPLANT LIVER RECIPIENT  DONOR N/A 2018    Procedure: TRANSPLANT LIVER RECIPIENT  DONOR;  Surgeon: mEil Echevarria MD;  [facility]          Medications  Prior to Admission medications    Medication Sig Start Date End Date Taking? Authorizing Provider   alfuzosin ER (UROXATRAL) 10 MG 24 hr tablet Take 1 tablet (10 mg) by mouth daily 19  Yes Jaswinder Anne MD   amLODIPine (NORVASC) 10 MG tablet Take 1 tablet (10 mg) by mouth daily 19  Yes Inez Baker APRN CNP   aspirin (ASA) 325 MG EC tablet Take 1 tablet (325 mg) by mouth daily 19  Yes Emil Echevarria MD   blood glucose (NO BRAND SPECIFIED) lancets standard Use to test blood sugar 4 times daily or as directed. 19 Yes Inez Baker APRN  CNP   blood glucose (ONETOUCH VERIO IQ) test strip Use to test blood sugar 4 times daily or as directed. 1/7/19 1/7/20 Yes Inez Baker APRN CNP   metFORMIN (GLUCOPHAGE-XR) 500 MG 24 hr tablet Take 4 tablets (2,000 mg) by mouth daily (with dinner) 6/5/19  Yes Jaswinder Anne MD   metoprolol tartrate 75 MG TABS Take 75 mg by mouth 2 times daily 1/7/19  Yes Inez Baker APRN CNP   multivitamin w/minerals (THERA-VIT-M) tablet Take 1 tablet by mouth daily 1/24/19  Yes Emil Echevarria MD   Sharps Container MISC 1 each daily 1/7/19  Yes Inez Baker APRN CNP   tacrolimus (GENERIC EQUIVALENT) 1 MG capsule Take 5 capsules (5 mg) by mouth every 12 hours 5/24/19  Yes Emil Echevarria MD   ursodiol (ACTIGALL) 250 MG tablet Take 250 mg by mouth 2 times daily   Yes Reported, Patient   gabapentin (NEURONTIN) 100 MG capsule Take 1 capsule (100 mg) by mouth 3 times daily Start 100 mg at bedtime for 1-2 weeks, then 2x daily for 1-2 weeks, then 3x daily thereafter.  Patient not taking: Reported on 6/26/2019 5/28/19   Rolly Smith MD   glucose 40 % (400 mg/mL) GEL gel Take 15-30 g by mouth every 15 minutes as needed for low blood sugar  Patient not taking: Reported on 6/26/2019 1/7/19   Inez Baker APRN CNP   magnesium citrate solution Take 296 mLs by mouth daily  Patient not taking: Reported on 6/26/2019 3/4/19   Emil Echevarria MD   urea (GORMEL) 20 % external cream Apply as a moisturizer to your whole body everyday.  Patient not taking: Reported on 6/26/2019 2/20/19   Rolly Smith MD       Allergies  No Known Allergies    Review of systems  A 10-point review of systems was negative    Examination  /81   Pulse 63   Temp 97.9  F (36.6  C)   Wt 80.9 kg (178 lb 6.4 oz)   SpO2 97%   BMI 27.94 kg/m     Body mass index is 27.94 kg/m .    Gen- well, NAD, A+Ox3, normal color  Lym- no palpable LAD  CVS- RRR  RS- CTA  Abd- Healed surgical scars.  Extr- hands  normal, no OSKAR  Skin- no rash or jaundice  Neuro- no asterixis  Psych- normal mood    Laboratory  Lab Results   Component Value Date     06/26/2019    POTASSIUM 4.8 06/26/2019    CHLORIDE 109 06/26/2019    CO2 25 06/26/2019    BUN 26 06/26/2019    CR 0.82 06/26/2019       Lab Results   Component Value Date    BILITOTAL 0.6 06/26/2019    ALT 23 06/26/2019    AST 11 06/26/2019    ALKPHOS 189 06/26/2019       Lab Results   Component Value Date    ALBUMIN 3.9 06/26/2019    PROTTOTAL 7.1 06/26/2019        Lab Results   Component Value Date    WBC 4.1 06/26/2019    HGB 11.9 06/26/2019    MCV 87 06/26/2019     06/26/2019       Lab Results   Component Value Date    INR 1.14 12/31/2018       Radiology    ASSESSMENT AND PLAN:  Status post liver transplantation.  Mr. Limon had received a cadaveric liver transplantation on 12/22/2018.  He is doing quite well regarding that.  No signs of recurrence of his HCC so far.  He is going to follow with Oncology.  He is taking his immunosuppression and he is very comfortable with that.  He was still taking Bactrim, which we will discontinue today.  Otherwise, he will be seen by Dermatology yearly and he will need to follow Urology for his benign prostatic hypertrophy.  For all his other medical issues, he will follow with the primary care team and he will be seen here in 6 months.      This was a 25-minute visit, of which more than 50% was spent in explaining to the patient what our plan of care was.  We answered all his questions.      cc:  Primary care physician       Tamela Carballo MD  Hepatology  [facility]    Again, thank you for allowing me to participate in the care of your patient.      Sincerely,    Tamela Carballo MD

## 2019-06-26 NOTE — TELEPHONE ENCOUNTER
Tacrolimus level 9.9.    Please instruct patient (using  and ask him to decrease tacrolimus dose to 4 mg in the morning and 4 mg in the evening.

## 2019-06-26 NOTE — NURSING NOTE
Chief Complaint   Patient presents with     RECHECK     3  mos follow up         .jyoti Duarte  EMT

## 2019-06-26 NOTE — TELEPHONE ENCOUNTER
Through  Shu, instructed pt to reduce tacro to 4mg every 12 hours. Pt able to repeat instructions.

## 2019-06-26 NOTE — LETTER
2019      RE: Loy Limon  [address]  [facility] [place] [address]       [facility]    Hepatology follow-up    CHIEF COMPLAINT/REASON FOR THE VISIT:  Status post liver transplantation.      SUBJECTIVE:  Mr. Limon is a 64-year-old male who I saw initially for transplant evaluation for hepatocellular carcinoma in the context of alcohol and the diagnosis of hepatocellular carcinoma was made on 2018.  This was in the context of cirrhosis related with alcohol.  He did have it evaluated for transplantation and was listed on 2018.  He then had a microwave ablation and after he got the liver transplantation his explant showed there was the presence of viable tumor, although so far he does not show any signs of recurrence.  Please note that he had also some strictures which were managed with ERCP by our interventional endoscopy.  Today he is complaining of some scar discomfort.  He did have some dysphagia that was evaluated by ENT and the patient is doing quite well.  He has some bloating.  He eats a lot of dairy products including cheese, although he is not associated with that, but he has gained some weight, like 8 pounds since we last saw him.  He denies any shortness of breath, chest pain.  He has seen Dermatology.  Otherwise, he has an enlarged prostate and is followed by Urology, and he was last seen on  and this was thought to be benign prostatic hypertrophy.     Medical hx Surgical hx   Past Medical History:   Diagnosis Date     Biliary stricture of transplanted liver (H) 2018     Cancer (H)     hepatocellualr carcinoma     Cirrhosis of liver (H) 2018     Enlarged prostate      Inguinal hernia     Repaired with mesh on 18     Liver lesion 2018     Liver transplanted (H) 2018     donor liver transplant     Portal vein thrombosis     on path explant     Postoperative atrial fibrillation (H) 2018     Pre-diabetes  2018      Past Surgical History:   Procedure Laterality Date     APPENDECTOMY       COLONOSCOPY           ENDOSCOPIC RETROGRADE CHOLANGIOPANCREATOGRAM N/A 2018    Procedure: ENDOSCOPIC RETROGRADE CHOLANGIOPANCREATOGRAM, with Billary Sphincterotomy and Billary Stent Placement;  Surgeon: Omero Lawler MD;  [facility]     ENDOSCOPIC RETROGRADE CHOLANGIOPANCREATOGRAM  2019    Procedure: COMBINED ENDOSCOPIC RETROGRADE CHOLANGIOPANCREATOGRAPHY, Bile Duct Stent Exchange;  Surgeon: Omero Lawler MD;  [facility]     ENDOSCOPIC RETROGRADE CHOLANGIOPANCREATOGRAM N/A 2019    Procedure: ENDOSCOPIC RETROGRADE CHOLANGIOPANCREATOGRAPHY, WITH BILIARY STENT REMOVAL AND STONE EXTRACTION;  Surgeon: Omero Lawler MD;  [facility]     HERNIORRHAPHY INGUINAL  2018    Procedure: Herniorrhaphy inguinal;  Surgeon: Emil Echevarria MD;  [facility]     IR LIVER BIOPSY PERCUTANEOUS  2018     TRANSPLANT LIVER RECIPIENT  DONOR N/A 2018    Procedure: TRANSPLANT LIVER RECIPIENT  DONOR;  Surgeon: Emil Echevarria MD;  Location: [facility]          Medications  Prior to Admission medications    Medication Sig Start Date End Date Taking? Authorizing Provider   alfuzosin ER (UROXATRAL) 10 MG 24 hr tablet Take 1 tablet (10 mg) by mouth daily 19  Yes Jaswinder Anne MD   amLODIPine (NORVASC) 10 MG tablet Take 1 tablet (10 mg) by mouth daily 19  Yes Inez Baker APRN CNP   aspirin (ASA) 325 MG EC tablet Take 1 tablet (325 mg) by mouth daily 19  Yes Emil Echevarria MD   blood glucose (NO BRAND SPECIFIED) lancets standard Use to test blood sugar 4 times daily or as directed. 19 Yes Inez Baker APRN CNP   blood glucose (ONETOUCH VERIO IQ) test strip Use to test blood sugar 4 times daily or as directed. 19 Yes Inez Baker APRN CNP   metFORMIN (GLUCOPHAGE-XR) 500 MG 24 hr  tablet Take 4 tablets (2,000 mg) by mouth daily (with dinner) 6/5/19  Yes Jaswinder Anne MD   metoprolol tartrate 75 MG TABS Take 75 mg by mouth 2 times daily 1/7/19  Yes Inez Baker APRN CNP   multivitamin w/minerals (THERA-VIT-M) tablet Take 1 tablet by mouth daily 1/24/19  Yes Emil Echevarria MD   Sharps Container MISC 1 each daily 1/7/19  Yes Inez Baker APRN CNP   tacrolimus (GENERIC EQUIVALENT) 1 MG capsule Take 5 capsules (5 mg) by mouth every 12 hours 5/24/19  Yes Emil Echevarria MD   ursodiol (ACTIGALL) 250 MG tablet Take 250 mg by mouth 2 times daily   Yes Reported, Patient   gabapentin (NEURONTIN) 100 MG capsule Take 1 capsule (100 mg) by mouth 3 times daily Start 100 mg at bedtime for 1-2 weeks, then 2x daily for 1-2 weeks, then 3x daily thereafter.  Patient not taking: Reported on 6/26/2019 5/28/19   Rolly Smith MD   glucose 40 % (400 mg/mL) GEL gel Take 15-30 g by mouth every 15 minutes as needed for low blood sugar  Patient not taking: Reported on 6/26/2019 1/7/19   Inez Baker APRN CNP   magnesium citrate solution Take 296 mLs by mouth daily  Patient not taking: Reported on 6/26/2019 3/4/19   Emil Echevarria MD   urea (GORMEL) 20 % external cream Apply as a moisturizer to your whole body everyday.  Patient not taking: Reported on 6/26/2019 2/20/19   Rolly Smith MD       Allergies  No Known Allergies    Review of systems  A 10-point review of systems was negative    Examination  /81   Pulse 63   Temp 97.9  F (36.6  C)   Wt 80.9 kg (178 lb 6.4 oz)   SpO2 97%   BMI 27.94 kg/m     Body mass index is 27.94 kg/m .    Gen- well, NAD, A+Ox3, normal color  Lym- no palpable LAD  CVS- RRR  RS- CTA  Abd- Healed surgical scars.  Extr- hands normal, no OSKAR  Skin- no rash or jaundice  Neuro- no asterixis  Psych- normal mood    Laboratory  Lab Results   Component Value Date     06/26/2019    POTASSIUM 4.8 06/26/2019    CHLORIDE 109  06/26/2019    CO2 25 06/26/2019    BUN 26 06/26/2019    CR 0.82 06/26/2019       Lab Results   Component Value Date    BILITOTAL 0.6 06/26/2019    ALT 23 06/26/2019    AST 11 06/26/2019    ALKPHOS 189 06/26/2019       Lab Results   Component Value Date    ALBUMIN 3.9 06/26/2019    PROTTOTAL 7.1 06/26/2019        Lab Results   Component Value Date    WBC 4.1 06/26/2019    HGB 11.9 06/26/2019    MCV 87 06/26/2019     06/26/2019       Lab Results   Component Value Date    INR 1.14 12/31/2018       Radiology    ASSESSMENT AND PLAN:  Status post liver transplantation.  Mr. Limon had received a cadaveric liver transplantation on 12/22/2018.  He is doing quite well regarding that.  No signs of recurrence of his HCC so far.  He is going to follow with Oncology.  He is taking his immunosuppression and he is very comfortable with that.  He was still taking Bactrim, which we will discontinue today.  Otherwise, he will be seen by Dermatology yearly and he will need to follow Urology for his benign prostatic hypertrophy.  For all his other medical issues, he will follow with the primary care team and he will be seen here in 6 months.      This was a 25-minute visit, of which more than 50% was spent in explaining to the patient what our plan of care was.  We answered all his questions.      cc:  Primary care physician       Tamela Carballo MD  Hepatology  [facility]    Tamela Carballo MD

## 2019-06-26 NOTE — TELEPHONE ENCOUNTER
Here for post liver transplant follow-up.  Accompanied by Ernie dugan.    Doing well other than weight and glucose control.  We urged Claribel to see his PCP, Dr. Anne. Reviewed recent labs and assisted with interpretation.     Complaints / Concerns:  Feels belly is getting large.  Encouraged glucose control and weight loss / activity.    Current immunosuppression:    Prograf monotherapy    Med changes: Stopped bactrim.  Updated patient's med card.    Lab frequency:  monthly    Follow-up:  Dr. Carballo in 3 mos, PCP asap.  Reviewed my contact information, now to reach the transplant office during weekday and afterhours.

## 2019-07-01 ENCOUNTER — TELEPHONE (OUTPATIENT)
Dept: TRANSPLANT | Facility: CLINIC | Age: 64
End: 2019-07-01

## 2019-07-01 NOTE — TELEPHONE ENCOUNTER
Patient left a VM message on 7/1/19 at 2:44 pm with  would like a return with a  No details were left.

## 2019-07-01 NOTE — TELEPHONE ENCOUNTER
Returned call w/ a .  He is looking for assistance w/ a return to work letter.  I transferred to Lucille Leone.

## 2019-07-02 ENCOUNTER — PRE VISIT (OUTPATIENT)
Dept: FAMILY MEDICINE | Facility: CLINIC | Age: 64
End: 2019-07-02

## 2019-07-02 ENCOUNTER — TELEPHONE (OUTPATIENT)
Dept: TRANSPLANT | Facility: CLINIC | Age: 64
End: 2019-07-02

## 2019-07-02 DIAGNOSIS — Z13.220 SCREENING FOR CHOLESTEROL LEVEL: ICD-10-CM

## 2019-07-02 DIAGNOSIS — E09.9 DRUG-INDUCED DIABETES MELLITUS (H): Primary | ICD-10-CM

## 2019-07-02 NOTE — TELEPHONE ENCOUNTER
[facility]  Dept: [phone]     Patient: Loy Limon   : 1955  MRN: [id number]  Encounter: 19       Pre Visit Assessment    Health Maintenance Due   Topic Date Due     LIPID  1955     DIABETIC FOOT EXAM  1955     ADVANCE CARE PLANNING  1955     EYE EXAM  1955     MEDICARE ANNUAL WELLNESS VISIT  2018     Chart Reviewed for Labs needed/Health Maintenance: Yes   If labs needed, orders placed:  Yes Fasting Lipid,   Lab appointment scheduled:  Yes    Notes:  Patient confirmed they will attend appointment:  Yes  Appointment rescheduled?  No      Allison Oh at 11:08 AM on 2019

## 2019-07-02 NOTE — TELEPHONE ENCOUNTER
"[facility] Phone Call      Data: Received a call from patient.   was also present on this phone call.  Loy reports his [employer] was set to term on May 1st but was  \"extended\" to August 1st.  He says he will be uninsured after this date.  He does not understand why he will no longer be eligible for this program.  Intervention: To be determined.  Assessment: Further assessment is needed.  Plan: To consult with Roya Mccracken, Liver [facility] .      ABRAHAM Gaming, [facility]  Liver Transplant   Phone [phone]  Pager [phone]   "

## 2019-07-05 ENCOUNTER — TELEPHONE (OUTPATIENT)
Dept: UROLOGY | Facility: CLINIC | Age: 64
End: 2019-07-05

## 2019-07-05 ENCOUNTER — PRE VISIT (OUTPATIENT)
Dept: UROLOGY | Facility: CLINIC | Age: 64
End: 2019-07-05

## 2019-07-05 DIAGNOSIS — R97.20 ELEVATED PROSTATE SPECIFIC ANTIGEN (PSA): Primary | ICD-10-CM

## 2019-07-05 RX ORDER — CIPROFLOXACIN 500 MG/1
500 TABLET, FILM COATED ORAL 2 TIMES DAILY
Qty: 6 TABLET | Refills: 0 | Status: SHIPPED | OUTPATIENT
Start: 2019-07-11 | End: 2019-07-15

## 2019-07-05 RX ORDER — MAGNESIUM HYDROXIDE 1200 MG/15ML
1 LIQUID ORAL
Qty: 133 ML | Refills: 0 | Status: SHIPPED | OUTPATIENT
Start: 2019-07-05 | End: 2019-07-15

## 2019-07-05 NOTE — TELEPHONE ENCOUNTER
Chief Complaint : Return-MRI Bx    Hx/Sx: Elevated PSA    Records/Orders: Cipro and Enema e-prescribed     Pt Contacted: Y- Called and reviewed prep and answered all questions.    At Rooming: MRI Bx

## 2019-07-05 NOTE — TELEPHONE ENCOUNTER
[facility] Call Center    Phone Message    May a detailed message be left on voicemail: yes    Reason for Call: Other: Ciprofloxacin (CIPRO) 500 MG tablet. Per call from Debora, there's two different directions on this RX and clarification is needed. Please reach out to Debora.     Action Taken: Message routed to:  [facility] ([facility] Urology

## 2019-07-10 ENCOUNTER — OFFICE VISIT (OUTPATIENT)
Dept: FAMILY MEDICINE | Facility: CLINIC | Age: 64
End: 2019-07-10
Payer: COMMERCIAL

## 2019-07-10 VITALS
WEIGHT: 174 LBS | SYSTOLIC BLOOD PRESSURE: 135 MMHG | HEART RATE: 58 BPM | DIASTOLIC BLOOD PRESSURE: 75 MMHG | BODY MASS INDEX: 27.25 KG/M2

## 2019-07-10 DIAGNOSIS — I10 ESSENTIAL HYPERTENSION: Primary | ICD-10-CM

## 2019-07-10 DIAGNOSIS — Z94.4 LIVER REPLACED BY TRANSPLANT (H): ICD-10-CM

## 2019-07-10 DIAGNOSIS — Z13.220 SCREENING FOR CHOLESTEROL LEVEL: ICD-10-CM

## 2019-07-10 DIAGNOSIS — Z94.4 LIVER TRANSPLANT RECIPIENT (H): ICD-10-CM

## 2019-07-10 DIAGNOSIS — E09.9 DRUG-INDUCED DIABETES MELLITUS (H): ICD-10-CM

## 2019-07-10 LAB
ALBUMIN SERPL-MCNC: 4 G/DL (ref 3.4–5)
ALP SERPL-CCNC: 173 U/L (ref 40–150)
ALT SERPL W P-5'-P-CCNC: 18 U/L (ref 0–70)
ANION GAP SERPL CALCULATED.3IONS-SCNC: 5 MMOL/L (ref 3–14)
AST SERPL W P-5'-P-CCNC: 15 U/L (ref 0–45)
BILIRUB DIRECT SERPL-MCNC: 0.3 MG/DL (ref 0–0.2)
BILIRUB SERPL-MCNC: 0.9 MG/DL (ref 0.2–1.3)
BUN SERPL-MCNC: 26 MG/DL (ref 7–30)
CALCIUM SERPL-MCNC: 9.1 MG/DL (ref 8.5–10.1)
CHLORIDE SERPL-SCNC: 106 MMOL/L (ref 94–109)
CHOLEST SERPL-MCNC: 122 MG/DL
CO2 SERPL-SCNC: 27 MMOL/L (ref 20–32)
CREAT SERPL-MCNC: 0.9 MG/DL (ref 0.66–1.25)
ERYTHROCYTE [DISTWIDTH] IN BLOOD BY AUTOMATED COUNT: 12.8 % (ref 10–15)
GFR SERPL CREATININE-BSD FRML MDRD: 88 ML/MIN/{1.73_M2}
GLUCOSE SERPL-MCNC: 128 MG/DL (ref 70–99)
HCT VFR BLD AUTO: 36.7 % (ref 40–53)
HDLC SERPL-MCNC: 44 MG/DL
HGB BLD-MCNC: 12.2 G/DL (ref 13.3–17.7)
LDLC SERPL CALC-MCNC: 65 MG/DL
MAGNESIUM SERPL-MCNC: 1.9 MG/DL (ref 1.6–2.3)
MCH RBC QN AUTO: 29.2 PG (ref 26.5–33)
MCHC RBC AUTO-ENTMCNC: 33.2 G/DL (ref 31.5–36.5)
MCV RBC AUTO: 88 FL (ref 78–100)
NONHDLC SERPL-MCNC: 77 MG/DL
PHOSPHATE SERPL-MCNC: 3.6 MG/DL (ref 2.5–4.5)
PLATELET # BLD AUTO: 187 10E9/L (ref 150–450)
POTASSIUM SERPL-SCNC: 5 MMOL/L (ref 3.4–5.3)
PROT SERPL-MCNC: 7.2 G/DL (ref 6.8–8.8)
RBC # BLD AUTO: 4.18 10E12/L (ref 4.4–5.9)
SODIUM SERPL-SCNC: 138 MMOL/L (ref 133–144)
TACROLIMUS BLD-MCNC: 4.7 UG/L (ref 5–15)
TME LAST DOSE: ABNORMAL H
TRIGL SERPL-MCNC: 63 MG/DL
WBC # BLD AUTO: 3.6 10E9/L (ref 4–11)

## 2019-07-10 ASSESSMENT — PAIN SCALES - GENERAL: PAINLEVEL: NO PAIN (0)

## 2019-07-10 NOTE — TELEPHONE ENCOUNTER
Tacrolimus level 4.7.    Dose is unclear in Epic.  Please call Johan, verify dose and ask him to increase tacrolimus dose by 1 mg am and pm

## 2019-07-10 NOTE — PROGRESS NOTES
[facility]  Primary Care Center   Jaswinder Anne MD  07/10/2019      Chief Complaint:   Diabetes     History of Present Illness:   Loy Limon is a 64 year old male with a history of hepatocellular carcinoma s/p liver transplant with biliary stricture post-transplant, immunosuppressed by Tacrolimus, drug-induced diabetes, and enlarged prostate who presents with an  for follow-up from his 6/5/19 visit.     Liver Transplant   He has returned to work now after having a liver transplant on 12/21/18. He notes his belly is sore since he started work yesterday, but he believes he just needs more exercise to increase his circulation and reduce swelling. He is happy to work and feels generally good.     Diabetes   At his last visit on 6/5, he noted that he would try to increase his walking for exercise. He wakes up at 7am every morning now and walks around [facility]. However, he noticed that his blood glucose was not decreasing until he started work yesterday. He works as a  and , but told his employment that he would prefer to walk around as the environment is cold. He now walks from station to station helping out others. His blood glucose after starting work was 118, when he struggled to get it lower than 160/165 prior. He is trying to eat healthier and is staying away from rice, tortillas, cookies, and sweets. He does believe that he always had elevated blood glucose in the 138-140 region. Of note, he does take metformin 500mg TID. He endorses recently having a stable diabetic eye exam.      Enlarged Prostate   He saw Dr. Torres in Urology for an enlarged prostate on 5/3/19. He notes that he feels good and has a follow-up to discuss a prostate biopsy on 7/26/19.      Review of Systems:   Pertinent items are noted in HPI or as in patient entered ROS below, remainder of complete ROS is negative.     Active Medications:      alfuzosin ER (UROXATRAL) 10 MG 24 hr tablet, Take 1 tablet  (10 mg) by mouth daily, Disp: 90 tablet, Rfl: 3     amLODIPine (NORVASC) 10 MG tablet, Take 1 tablet (10 mg) by mouth daily, Disp: 30 tablet, Rfl: 11     aspirin (ASA) 325 MG EC tablet, Take 1 tablet (325 mg) by mouth daily, Disp: 30 tablet, Rfl: 11     tacrolimus (GENERIC EQUIVALENT) 1 MG capsule, Take 4 capsules (4 mg) by mouth every 12 hours (Patient taking differently: Take 5 mg by mouth every 12 hours ), Disp: 240 capsule, Rfl: 11     ursodiol (ACTIGALL) 250 MG tablet, Take 250 mg by mouth 2 times daily, Disp: , Rfl:      metFORMIN (GLUCOPHAGE-XR) 500 MG 24 hr tablet, Take 4 tablets (2,000 mg) by mouth daily (with dinner), Disp: 120 tablet, Rfl: 3     metoprolol tartrate 75 MG TABS, Take 75 mg by mouth 2 times daily, Disp: 60 tablet, Rfl: 11     multivitamin w/minerals (THERA-VIT-M) tablet, Take 1 tablet by mouth daily, Disp: 90 tablet, Rfl: 3     saline laxitive (RA 7-19) 7-19 GM/118ML enema, Place 1 each rectally once as needed (Prostate Biopsy Prep), Disp: 133 mL, Rfl: 0     Allergies:   Patient has no known allergies.      Past Medical History:  Biliary stricture of transplanted liver  Hepatocellular carcinoma  Cirrhosis of liver  Enlarged prostate  Inguinal hernias  Portal vein thrombosis  Post-op atrial fibrillation  Prediabetes  Hypertension      Past Surgical History:  Appendectomy  Colonoscopy  Endoscopic retrograde cholangiopancreatography x3  Herniorrhaphy  Transplant liver  Liver biopsy     Family History:   Brother: liver disease      Social History:   The patient was accompanied to the appointment by: an   Smoking Status: Former smoker, 0.03 PPD for 20 years, quit 2018   Smokeless Tobacco: Never used   Alcohol Use: No, quit 2/2018  Employment status: /     Physical Exam:   /75   Pulse 58   Wt 78.9 kg (174 lb)   BMI 27.25 kg/m       Constitutional: Alert. In no distress.  Head: The scalp, face, and head appear normal.  Musculoskeletal: Extremities appear  normal. No gross deformities noted.   Neurologic: Gait normal. Speech is normal and fluent.  Psychiatric: Mentation appears normal. Normal affect.      Assessment and Plan:  Recent liver transplant, clinic notes reviewed. He is doing well. He has diabetes and is trying to walk more and watch his diet. Will recheck blood in September, also probably do a Pneumovax then, as last one was less than a year after his Prevnar.      Htn: at goal    Follow-up: Around September.      Scribe Disclosure:  I, Val Lacy, am serving as a scribe to document services personally performed by Jaswinder Anne MD at this visit, based upon the provider's statements to me. All documentation has been reviewed by the aforementioned provider prior to being entered into the official medical record.     Portions of this medical record were completed by a scribe. UPON MY REVIEW AND AUTHENTICATION BY ELECTRONIC SIGNATURE, this confirms (a) I performed the applicable clinical services, and (b) the record is accurate.   Jaswinder Anne MD

## 2019-07-10 NOTE — PROGRESS NOTES
[facility]- Outpatient Diabetes Follow up    Loy is a 63 year old male with history of Laennec's cirrhosis with HCC, portal hypertension, latent Tb who underwent living donor liver transplant 12/22/2018. He developed steroid/immunosuppressant induced diabetes and was maintained on NPH insulin during his prednisone taper, which he completed on 1/14/19. His BG remained in reasonable control, and his insulin was also discontinued on the last day of his steroid taper. He was instructed to follow up for BG review, and consideration of an oral agent if required. BG remained reasonable so he was instructed to continue lifestyle modifications and follow up with PCP.    A1c found to be 8.5% in May follow up with PCP, and he was initiated on Metformin, titrated up to current dosing over the past 2 months. He has been more active now that he is back to his job, and feels that glucoses are improved. He has been avoiding sweets and processed carbohydrates.     Fasting glucose done 7/10 was 128, and was 152 today, 7/15. He thinks it is because he had corn on the cob last night around 10:30 PM. He reports that they are usually 110's at home normally.  He does not check other pre-meal or bedtime glucoses.    He takes a walk every morning, and has recently increased his activity at work.   He eats three tortillas in the morning with breakfast. Has been eating a lot of fruit (watermelon, strawberry/banana smoothies), and corn on the cob.     Current Diabetes Regimen:   Metformin XR 1500mg daily with breakfast     Glucometer Readings:  Pt did not bring meter  Reports typical 's   FBG per BMP today was 152.     Weight: 177.1, from 174 lb at recent PCP visit  Physical Activity: increasing low to moderate activity as tolerated, walking  Nutrition: Three meals daily; 3 tortillas, fruit, corn on the cob. Avoiding sweets. Last meal of the day usually ~8:30 PM. No bedtime snack.      Diabetes Care  Retinopathy: no,  "has had follow up with ophthalmology in the past year.     Nephropathy: BP well controlled.  Creatinine 0.9.     Neuropathy: yes, \"sometimes\" now on Gabapentin    ROS: 10 point review of systems completed; pertinent positives and negatives documented in HPI    Allergies  No Known Allergies    Medications  Current Outpatient Medications   Medication Sig Dispense Refill     alfuzosin ER (UROXATRAL) 10 MG 24 hr tablet Take 1 tablet (10 mg) by mouth daily 90 tablet 3     amLODIPine (NORVASC) 10 MG tablet Take 1 tablet (10 mg) by mouth daily 30 tablet 11     aspirin (ASA) 325 MG EC tablet Take 1 tablet (325 mg) by mouth daily 30 tablet 11     blood glucose (NO BRAND SPECIFIED) lancets standard Use to test blood sugar 4 times daily or as directed. 200 each 11     blood glucose (ONETOUCH VERIO IQ) test strip Use to test blood sugar 4 times daily or as directed. 200 strip 11     gabapentin (NEURONTIN) 100 MG capsule Take 1 capsule (100 mg) by mouth 3 times daily Start 100 mg at bedtime for 1-2 weeks, then 2x daily for 1-2 weeks, then 3x daily thereafter. 90 capsule 3     metFORMIN (GLUCOPHAGE-XR) 500 MG 24 hr tablet Take 4 tablets (2,000 mg) by mouth daily (with dinner) (Patient taking differently: Take 1,500 mg by mouth daily (with dinner) ) 120 tablet 3     metoprolol tartrate 75 MG TABS Take 75 mg by mouth 2 times daily 60 tablet 11     multivitamin w/minerals (THERA-VIT-M) tablet Take 1 tablet by mouth daily 90 tablet 3     Sharps Container MISC 1 each daily 1 each 2     tacrolimus (GENERIC EQUIVALENT) 1 MG capsule Take 5 capsules (5 mg) by mouth every 12 hours 300 capsule 11     ursodiol (ACTIGALL) 250 MG tablet Take 250 mg by mouth 2 times daily       glucose 40 % (400 mg/mL) GEL gel Take 15-30 g by mouth every 15 minutes as needed for low blood sugar (Patient not taking: Reported on 6/26/2019) 30 g 3     urea (GORMEL) 20 % external cream Apply as a moisturizer to your whole body everyday. (Patient not taking: " Reported on 2019) 480 g 5       Family History  family history includes Liver Disease in his brother.    Social History   reports that he quit smoking about 15 months ago. His smoking use included cigarettes and cigars. He started smoking about 48 years ago. He has a 0.60 pack-year smoking history. He has never used smokeless tobacco. He reports that he does not drink alcohol or use drugs.     Past Medical History  Past Medical History:   Diagnosis Date     Biliary stricture of transplanted liver (H) 2018     Cancer (H)     hepatocellualr carcinoma     Cirrhosis of liver (H) 2018     Enlarged prostate      Inguinal hernia     Repaired with mesh on 18     Liver lesion 2018     Liver transplanted (H) 2018     donor liver transplant     Portal vein thrombosis     on path explant     Postoperative atrial fibrillation (H) 2018     Pre-diabetes 2018       Past Surgical History:   Procedure Laterality Date     APPENDECTOMY       COLONOSCOPY           ENDOSCOPIC RETROGRADE CHOLANGIOPANCREATOGRAM N/A 2018    Procedure: ENDOSCOPIC RETROGRADE CHOLANGIOPANCREATOGRAM, with Billary Sphincterotomy and Billary Stent Placement;  Surgeon: Omero Lawler MD;  Location: [facility] OR     ENDOSCOPIC RETROGRADE CHOLANGIOPANCREATOGRAM  2019    Procedure: COMBINED ENDOSCOPIC RETROGRADE CHOLANGIOPANCREATOGRAPHY, Bile Duct Stent Exchange;  Surgeon: Omero Lawler MD;  Location: [facility] OR     ENDOSCOPIC RETROGRADE CHOLANGIOPANCREATOGRAM N/A 2019    Procedure: ENDOSCOPIC RETROGRADE CHOLANGIOPANCREATOGRAPHY, WITH BILIARY STENT REMOVAL AND STONE EXTRACTION;  Surgeon: Omero Lawler MD;  Location: [facility] OR     HERNIORRHAPHY INGUINAL  2018    Procedure: Herniorrhaphy inguinal;  Surgeon: Emil Echevarria MD;  Location: [facility] OR     IR LIVER BIOPSY PERCUTANEOUS  2018     TRANSPLANT LIVER RECIPIENT  DONOR N/A 2018    Procedure: TRANSPLANT LIVER  RECIPIENT  DONOR;  Surgeon: Emil Echevarria MD;  Location: [facility] OR       Physical Exam  /74   Pulse (!) 45   Wt 80.3 kg (177 lb 1.6 oz)   BMI 27.74 kg/m    Body mass index is 27.74 kg/m .    GENERAL : In no apparent distress.   SKIN: Normal color, normal temperature, texture.  No  suspicious lesions or rashes  EYES: PERRLA.  No proptosis.  MOUTH: Moist, pink; pharynx clear  NECK: No visible masses.  RESP: normal respiratory effort.  cough   ABDOMEN: soft, nontender to palpation   NEURO: awake, alert, responds appropriately to questions.  Moves all extremities; Gait normal.     EXTREMITIES: No ulcers or open wounds.     RESULTS  Creatinine   Date Value Ref Range Status   07/15/2019 0.82 0.66 - 1.25 mg/dL Final     GFR Estimate   Date Value Ref Range Status   07/15/2019 >90 >60 mL/min/[1.73_m2] Final     Comment:     Non  GFR Calc  Starting 2018, serum creatinine based estimated GFR (eGFR) will be   calculated using the Chronic Kidney Disease Epidemiology Collaboration   (CKD-EPI) equation.       Hemoglobin A1C   Date Value Ref Range Status   2019 8.5 (H) 0 - 5.6 % Final     Comment:     Normal <5.7% Prediabetes 5.7-6.4%  Diabetes 6.5% or higher - adopted from ADA   consensus guidelines.       Potassium   Date Value Ref Range Status   07/15/2019 4.8 3.4 - 5.3 mmol/L Final     ALT   Date Value Ref Range Status   07/15/2019 15 0 - 70 U/L Final     AST   Date Value Ref Range Status   07/15/2019 12 0 - 45 U/L Final     TSH   Date Value Ref Range Status   2018 6.36 (H) 0.40 - 4.00 mU/L Final     T4 Free   Date Value Ref Range Status   2018 1.04 0.76 - 1.46 ng/dL Final       TSH   Date Value Ref Range Status   2018 6.36 (H) 0.40 - 4.00 mU/L Final     T4 Free   Date Value Ref Range Status   2018 1.04 0.76 - 1.46 ng/dL Final       Creatinine   Date Value Ref Range Status   07/10/2019 0.90 0.66 - 1.25 mg/dL Final       ASSESSMENT/PLAN:    1. Steroid  induced, post-liver transplant hyperglycemia. Now off prednisone, continues on tacrolimus and mycophenolate.    -most recent A1c 5.8 >8.5% with trial off of antihyperglycemics   -now on Metformin XL (Glucophage) 1500 mg daily- based on FBG today of 152 per BMP, will increase to maximum Metformin dose of 2000 mg daily; split into BID dosing (1000mg BID with breakfast and dinner)   -recommended he begin checking pre-dinner BG a few times weekly, and bring meter to next appt for more data.     -reviewed dietary options in diabetes. He still has a fair amount of carbohydrate intake, but he reports he has been much more diligent. Encouraged continued reduction in tortilla/bread intake, watching fruit and starchy vegetable portions. He declined CDE/RD follow up for the time being.        2. Elevated TSH: 6.36 on 7/19/18, with normal free T4.    recommend recheck labs - ordered to be drawn at convenience, prior to next appt.      He is due for  A1c vitamin D, urine micro albumin, and TSH labs. Ordered.   He reported his insurance will be up in August and is working ahead to make sure it does not lapse.     Follow up:  1. With [facility] in 3 months.     The patient is  enrolled in [employer] services    I spent 25 minutes with this patient face to face and explained the conditions and plans (more than 50% of time was counseling/coordination of care, diabetes management, follow up plan for worsening hyper and hypoglycemia) . The patient understood and is satisfied with today's visit.     A professional Green[place]ic in person  was utilized for this visit.     ARLETH Coffey, PA-C  [facility]   Pager [phone]

## 2019-07-10 NOTE — NURSING NOTE
Chief Complaint   Patient presents with     Diabetes       Allison Oh LPN at 9:25 AM on July 10, 2019

## 2019-07-10 NOTE — PATIENT INSTRUCTIONS
[facility] [phone] [place] [place] [facility] [place]     Urology [phone] [place] [place] [facility] [place]

## 2019-07-11 RX ORDER — TACROLIMUS 1 MG/1
5 CAPSULE ORAL EVERY 12 HOURS
Qty: 300 CAPSULE | Refills: 11 | Status: SHIPPED | OUTPATIENT
Start: 2019-07-11 | End: 2019-10-24

## 2019-07-11 NOTE — TELEPHONE ENCOUNTER
Spoke to pt through  [facility] to have pt increase tacro to 5mg every 12 hours. Pt previously taking 4mg BID.

## 2019-07-15 ENCOUNTER — OFFICE VISIT (OUTPATIENT)
Dept: ENDOCRINOLOGY | Facility: CLINIC | Age: 64
End: 2019-07-15
Attending: INTERNAL MEDICINE
Payer: COMMERCIAL

## 2019-07-15 VITALS
BODY MASS INDEX: 27.74 KG/M2 | WEIGHT: 177.1 LBS | HEART RATE: 45 BPM | DIASTOLIC BLOOD PRESSURE: 74 MMHG | SYSTOLIC BLOOD PRESSURE: 128 MMHG

## 2019-07-15 DIAGNOSIS — R73.9 STEROID-INDUCED HYPERGLYCEMIA: ICD-10-CM

## 2019-07-15 DIAGNOSIS — T38.0X5A STEROID-INDUCED HYPERGLYCEMIA: ICD-10-CM

## 2019-07-15 DIAGNOSIS — E11.65 UNCONTROLLED TYPE 2 DIABETES MELLITUS WITH HYPERGLYCEMIA (H): ICD-10-CM

## 2019-07-15 DIAGNOSIS — Z94.4 LIVER REPLACED BY TRANSPLANT (H): ICD-10-CM

## 2019-07-15 DIAGNOSIS — E55.9 VITAMIN D DEFICIENCY: ICD-10-CM

## 2019-07-15 DIAGNOSIS — E56.9 VITAMIN DEFICIENCY: Primary | ICD-10-CM

## 2019-07-15 LAB
ALBUMIN SERPL-MCNC: 3.7 G/DL (ref 3.4–5)
ALP SERPL-CCNC: 175 U/L (ref 40–150)
ALT SERPL W P-5'-P-CCNC: 15 U/L (ref 0–70)
ANION GAP SERPL CALCULATED.3IONS-SCNC: 4 MMOL/L (ref 3–14)
AST SERPL W P-5'-P-CCNC: 12 U/L (ref 0–45)
BILIRUB DIRECT SERPL-MCNC: 0.2 MG/DL (ref 0–0.2)
BILIRUB SERPL-MCNC: 0.5 MG/DL (ref 0.2–1.3)
BUN SERPL-MCNC: 25 MG/DL (ref 7–30)
CALCIUM SERPL-MCNC: 8.2 MG/DL (ref 8.5–10.1)
CHLORIDE SERPL-SCNC: 109 MMOL/L (ref 94–109)
CO2 SERPL-SCNC: 26 MMOL/L (ref 20–32)
CREAT SERPL-MCNC: 0.82 MG/DL (ref 0.66–1.25)
ERYTHROCYTE [DISTWIDTH] IN BLOOD BY AUTOMATED COUNT: 12.6 % (ref 10–15)
GFR SERPL CREATININE-BSD FRML MDRD: >90 ML/MIN/{1.73_M2}
GLUCOSE SERPL-MCNC: 152 MG/DL (ref 70–99)
HCT VFR BLD AUTO: 34.1 % (ref 40–53)
HGB BLD-MCNC: 11.1 G/DL (ref 13.3–17.7)
MAGNESIUM SERPL-MCNC: 2.1 MG/DL (ref 1.6–2.3)
MCH RBC QN AUTO: 29.3 PG (ref 26.5–33)
MCHC RBC AUTO-ENTMCNC: 32.6 G/DL (ref 31.5–36.5)
MCV RBC AUTO: 90 FL (ref 78–100)
PHOSPHATE SERPL-MCNC: 2.8 MG/DL (ref 2.5–4.5)
PLATELET # BLD AUTO: 157 10E9/L (ref 150–450)
POTASSIUM SERPL-SCNC: 4.8 MMOL/L (ref 3.4–5.3)
PROT SERPL-MCNC: 6.6 G/DL (ref 6.8–8.8)
RBC # BLD AUTO: 3.79 10E12/L (ref 4.4–5.9)
SODIUM SERPL-SCNC: 140 MMOL/L (ref 133–144)
TACROLIMUS BLD-MCNC: 10.3 UG/L (ref 5–15)
TME LAST DOSE: NORMAL H
WBC # BLD AUTO: 4.1 10E9/L (ref 4–11)

## 2019-07-15 PROCEDURE — 80197 ASSAY OF TACROLIMUS: CPT | Performed by: TRANSPLANT SURGERY

## 2019-07-15 ASSESSMENT — PAIN SCALES - GENERAL: PAINLEVEL: NO PAIN (0)

## 2019-07-15 NOTE — PATIENT INSTRUCTIONS
1. Increase your Metformin to 4 pills every day (total of 2,000mg daily)- take 2 in the morning with breakfast, and 2 in the evening with dinner.   2. Continue to watch your carbohydrate (sugar intake). We can set up a meeting with the dietician if you would like.   3. Begin checking your blood sugar before dinner a few times per week, in addition to in the morning. Bring your meter with to the next follow up.   4. Go to the lab for lab test prior to your next appointment.

## 2019-07-15 NOTE — LETTER
7/15/2019       RE: Loy Limon  [address]  [facility] [address]     Dear Colleague,    Thank you for referring your patient, Loy Limon, to the [facility] ENDOCRINOLOGY at [facility]. Please see a copy of my visit note below.    [facility]- Outpatient Diabetes Follow up    Loy is a 63 year old male with history of Laennec's cirrhosis with HCC, portal hypertension, latent Tb who underwent living donor liver transplant 12/22/2018. He developed steroid/immunosuppressant induced diabetes and was maintained on NPH insulin during his prednisone taper, which he completed on 1/14/19. His BG remained in reasonable control, and his insulin was also discontinued on the last day of his steroid taper. He was instructed to follow up for BG review, and consideration of an oral agent if required. BG remained reasonable so he was instructed to continue lifestyle modifications and follow up with PCP.    A1c found to be 8.5% in May follow up with PCP, and he was initiated on Metformin, titrated up to current dosing over the past 2 months. He has been more active now that he is back to his job, and feels that glucoses are improved. He has been avoiding sweets and processed carbohydrates.     Fasting glucose done 7/10 was 128, and was 152 today, 7/15. He thinks it is because he had corn on the cob last night around 10:30 PM. He reports that they are usually 110's at home normally.  He does not check other pre-meal or bedtime glucoses.    He takes a walk every morning, and has recently increased his activity at work.   He eats three tortillas in the morning with breakfast. Has been eating a lot of fruit (watermelon, strawberry/banana smoothies), and corn on the cob.     Current Diabetes Regimen:   Metformin XR 1500mg daily with breakfast     Glucometer Readings:  Pt did not bring meter  Reports typical 's   FBG per BMP today was 152.     Weight:  "177.1, from 174 lb at recent PCP visit  Physical Activity: increasing low to moderate activity as tolerated, walking  Nutrition: Three meals daily; 3 tortillas, fruit, corn on the cob. Avoiding sweets. Last meal of the day usually ~8:30 PM. No bedtime snack.      Diabetes Care  Retinopathy: no, has had follow up with ophthalmology in the past year.     Nephropathy: BP well controlled.  Creatinine 0.9.     Neuropathy: yes, \"sometimes\" now on Gabapentin    ROS: 10 point review of systems completed; pertinent positives and negatives documented in HPI    Allergies  No Known Allergies    Medications  Current Outpatient Medications   Medication Sig Dispense Refill     alfuzosin ER (UROXATRAL) 10 MG 24 hr tablet Take 1 tablet (10 mg) by mouth daily 90 tablet 3     amLODIPine (NORVASC) 10 MG tablet Take 1 tablet (10 mg) by mouth daily 30 tablet 11     aspirin (ASA) 325 MG EC tablet Take 1 tablet (325 mg) by mouth daily 30 tablet 11     blood glucose (NO BRAND SPECIFIED) lancets standard Use to test blood sugar 4 times daily or as directed. 200 each 11     blood glucose (ONETOUCH VERIO IQ) test strip Use to test blood sugar 4 times daily or as directed. 200 strip 11     gabapentin (NEURONTIN) 100 MG capsule Take 1 capsule (100 mg) by mouth 3 times daily Start 100 mg at bedtime for 1-2 weeks, then 2x daily for 1-2 weeks, then 3x daily thereafter. 90 capsule 3     metFORMIN (GLUCOPHAGE-XR) 500 MG 24 hr tablet Take 4 tablets (2,000 mg) by mouth daily (with dinner) (Patient taking differently: Take 1,500 mg by mouth daily (with dinner) ) 120 tablet 3     metoprolol tartrate 75 MG TABS Take 75 mg by mouth 2 times daily 60 tablet 11     multivitamin w/minerals (THERA-VIT-M) tablet Take 1 tablet by mouth daily 90 tablet 3     Sharps Container MISC 1 each daily 1 each 2     tacrolimus (GENERIC EQUIVALENT) 1 MG capsule Take 5 capsules (5 mg) by mouth every 12 hours 300 capsule 11     ursodiol (ACTIGALL) 250 MG tablet Take 250 mg by " mouth 2 times daily       glucose 40 % (400 mg/mL) GEL gel Take 15-30 g by mouth every 15 minutes as needed for low blood sugar (Patient not taking: Reported on 2019) 30 g 3     urea (GORMEL) 20 % external cream Apply as a moisturizer to your whole body everyday. (Patient not taking: Reported on 2019) 480 g 5       Family History  family history includes Liver Disease in his brother.    Social History   reports that he quit smoking about 15 months ago. His smoking use included cigarettes and cigars. He started smoking about 48 years ago. He has a 0.60 pack-year smoking history. He has never used smokeless tobacco. He reports that he does not drink alcohol or use drugs.     Past Medical History  Past Medical History:   Diagnosis Date     Biliary stricture of transplanted liver (H) 2018     Cancer (H)     hepatocellualr carcinoma     Cirrhosis of liver (H) 2018     Enlarged prostate      Inguinal hernia     Repaired with mesh on 18     Liver lesion 2018     Liver transplanted (H) 2018     donor liver transplant     Portal vein thrombosis     on path explant     Postoperative atrial fibrillation (H) 2018     Pre-diabetes 2018       Past Surgical History:   Procedure Laterality Date     APPENDECTOMY       COLONOSCOPY           ENDOSCOPIC RETROGRADE CHOLANGIOPANCREATOGRAM N/A 2018    Procedure: ENDOSCOPIC RETROGRADE CHOLANGIOPANCREATOGRAM, with Billary Sphincterotomy and Billary Stent Placement;  Surgeon: Omero Lawler MD;  [facility]     ENDOSCOPIC RETROGRADE CHOLANGIOPANCREATOGRAM  2019    Procedure: COMBINED ENDOSCOPIC RETROGRADE CHOLANGIOPANCREATOGRAPHY, Bile Duct Stent Exchange;  Surgeon: Omero Lawler MD;  [facility]     ENDOSCOPIC RETROGRADE CHOLANGIOPANCREATOGRAM N/A 2019    Procedure: ENDOSCOPIC RETROGRADE CHOLANGIOPANCREATOGRAPHY, WITH BILIARY STENT REMOVAL AND STONE EXTRACTION;  Surgeon: Omero Lawler  MD Feliciano;  [facility]     HERNIORRHAPHY INGUINAL  2018    Procedure: Herniorrhaphy inguinal;  Surgeon: Emil Echevarria MD;  [facility]     IR LIVER BIOPSY PERCUTANEOUS  2018     TRANSPLANT LIVER RECIPIENT  DONOR N/A 2018    Procedure: TRANSPLANT LIVER RECIPIENT  DONOR;  Surgeon: Emil Echevarria MD;  [facility]       Physical Exam  /74   Pulse (!) 45   Wt 80.3 kg (177 lb 1.6 oz)   BMI 27.74 kg/m     Body mass index is 27.74 kg/m .    GENERAL : In no apparent distress.   SKIN: Normal color, normal temperature, texture.  No  suspicious lesions or rashes  EYES: PERRLA.  No proptosis.  MOUTH: Moist, pink; pharynx clear  NECK: No visible masses.  RESP: normal respiratory effort.  cough   ABDOMEN: soft, nontender to palpation   NEURO: awake, alert, responds appropriately to questions.  Moves all extremities; Gait normal.     EXTREMITIES: No ulcers or open wounds.     RESULTS  Creatinine   Date Value Ref Range Status   07/15/2019 0.82 0.66 - 1.25 mg/dL Final     GFR Estimate   Date Value Ref Range Status   07/15/2019 >90 >60 mL/min/[1.73_m2] Final     Comment:     Non  GFR Calc  Starting 2018, serum creatinine based estimated GFR (eGFR) will be   calculated using the Chronic Kidney Disease Epidemiology Collaboration   (CKD-EPI) equation.       Hemoglobin A1C   Date Value Ref Range Status   2019 8.5 (H) 0 - 5.6 % Final     Comment:     Normal <5.7% Prediabetes 5.7-6.4%  Diabetes 6.5% or higher - adopted from ADA   consensus guidelines.       Potassium   Date Value Ref Range Status   07/15/2019 4.8 3.4 - 5.3 mmol/L Final     ALT   Date Value Ref Range Status   07/15/2019 15 0 - 70 U/L Final     AST   Date Value Ref Range Status   07/15/2019 12 0 - 45 U/L Final     TSH   Date Value Ref Range Status   2018 6.36 (H) 0.40 - 4.00 mU/L Final     T4 Free   Date Value Ref Range Status   2018 1.04 0.76 - 1.46 ng/dL Final        TSH   Date Value Ref Range Status   07/19/2018 6.36 (H) 0.40 - 4.00 mU/L Final     T4 Free   Date Value Ref Range Status   07/19/2018 1.04 0.76 - 1.46 ng/dL Final       Creatinine   Date Value Ref Range Status   07/10/2019 0.90 0.66 - 1.25 mg/dL Final       ASSESSMENT/PLAN:    1. Steroid induced, post-liver transplant hyperglycemia. Now off prednisone, continues on tacrolimus and mycophenolate.    -most recent A1c 5.8 >8.5% with trial off of antihyperglycemics   -now on Metformin XL (Glucophage) 1500 mg daily- based on FBG today of 152 per BMP, will increase to maximum Metformin dose of 2000 mg daily; split into BID dosing (1000mg BID with breakfast and dinner)   -recommended he begin checking pre-dinner BG a few times weekly, and bring meter to next appt for more data.     -reviewed dietary options in diabetes. He still has a fair amount of carbohydrate intake, but he reports he has been much more diligent. Encouraged continued reduction in tortilla/bread intake, watching fruit and starchy vegetable portions. He declined CDE/RD follow up for the time being.        2. Elevated TSH: 6.36 on 7/19/18, with normal free T4.    recommend recheck labs - ordered to be drawn at convenience, prior to next appt.      He is due for  A1c vitamin D, urine micro albumin, and TSH labs. Ordered.   He reported his insurance will be up in August and is working ahead to make sure it does not lapse.     Follow up:  1. With [facility] in 3 months.     The patient is  enrolled in [employer] services    I spent 25 minutes with this patient face to face and explained the conditions and plans (more than 50% of time was counseling/coordination of care, diabetes management, follow up plan for worsening hyper and hypoglycemia) . The patient understood and is satisfied with today's visit.     A professional [place] in person  was utilized for this visit.     ARLETH Coffey, PA-C  [facility]    Pager [phone]

## 2019-07-15 NOTE — TELEPHONE ENCOUNTER
Addendum on 7/15/19: Per Roya Mccracken, [facility] [employer] [employer] , [facility] was contacted and they do NOT show a term date for patient's [place] [employer] Assistance.  Roya will check again the end of this month.      I called patient with an  and relayed this information.  Patient continues to believe his insurance will term the end of this month.  He plans to go in-person to [facility] to inquire further.  He also started working five days ago.  I did review [place] EPD program information.         ABRAHAM Gaming, [facility]  Liver Transplant   Phone [phone]  Pager [phone]

## 2019-07-19 ENCOUNTER — PRE VISIT (OUTPATIENT)
Dept: UROLOGY | Facility: CLINIC | Age: 64
End: 2019-07-19

## 2019-07-19 NOTE — TELEPHONE ENCOUNTER
Chief Complaint : Return-MRI Bx     Hx/Sx: Elevated PSA     Records/Orders: Cipro and Enema e-prescribed      Pt Contacted: Y- Called and reviewed prep and answered all questions w/ interpreters services ([phone])  He stated understanding and that he has the medications and enema and will prep accordingly      At Rooming: MRI Bx

## 2019-07-26 ENCOUNTER — OFFICE VISIT (OUTPATIENT)
Dept: UROLOGY | Facility: CLINIC | Age: 64
End: 2019-07-26
Payer: COMMERCIAL

## 2019-07-26 VITALS
DIASTOLIC BLOOD PRESSURE: 77 MMHG | BODY MASS INDEX: 27.74 KG/M2 | HEIGHT: 67 IN | SYSTOLIC BLOOD PRESSURE: 143 MMHG | HEART RATE: 67 BPM

## 2019-07-26 DIAGNOSIS — R97.20 ELEVATED PROSTATE SPECIFIC ANTIGEN (PSA): Primary | ICD-10-CM

## 2019-07-26 RX ORDER — GENTAMICIN 40 MG/ML
80 INJECTION, SOLUTION INTRAMUSCULAR; INTRAVENOUS ONCE
Status: COMPLETED | OUTPATIENT
Start: 2019-07-26 | End: 2019-07-26

## 2019-07-26 RX ADMIN — GENTAMICIN 80 MG: 40 INJECTION, SOLUTION INTRAMUSCULAR; INTRAVENOUS at 09:04

## 2019-07-26 ASSESSMENT — PAIN SCALES - GENERAL: PAINLEVEL: NO PAIN (0)

## 2019-07-26 NOTE — LETTER
7/26/2019       RE: Loy Limon  [address]  [facility] [address]     Dear Colleague,    Thank you for referring your patient, Loy Limon, to the [facility] UROLOGY AND INST FOR PROSTATE AND UROLOGIC CANCERS at [facility]. Please see a copy of my visit note below.    Here for an MRI-ultrasound-fusion guided biopsy of the prostate    We previously obtained an MRI of the prostate and identified all PIRADS 4-5 lesions for targeting    Target Lesion #1 6 o'clock       Prostate volume on MRI 45 grams    PSA   Date Value Ref Range Status   04/26/2019 5.51 (H) 0 - 4 ug/L Final     Comment:     Assay Method:  Chemiluminescence using Siemens Vista analyzer   03/19/2019 5.02 (H) 0 - 4 ug/L Final     Comment:     Assay Method:  Chemiluminescence using Siemens Vista analyzer   03/04/2019 5.25 (H) 0 - 4 ug/L Final     Comment:     Assay Method:  Chemiluminescence using Siemens Vista analyzer   08/15/2018 3.27 0 - 4 ug/L Final     Comment:     Assay Method:  Chemiluminescence using Siemens Vista analyzer       An enema was completed and 500 mg of Cipro was given prior to the biopsy.  After obtaining informed consent patient was placed in lateral decubitus position.  The ultrasound probe was placed in the rectum.  The prostate was numbed using ultrasound guidance with 1% lidocaine 5 mls along each nerve bundle.  US images were obtained and then fused with the MRI images.  The fused images were then used to guide the biopsy of the targeted lesions with at least 2 cores taken of each lesion.  We then performed random biopsies.  12 cores were taken with 6 on each side, base, mid and apex.  The patient tolerated the procedure well.        Follow up in one week for results          Again, thank you for allowing me to participate in the care of your patient.      Sincerely,    Khloe Torres MD

## 2019-07-26 NOTE — NURSING NOTE
Return-Elevated PSA    He denies any urinary sx and pains.    Chief Complaint   Patient presents with     Prostate Biopsy     Elevated PSA, 1 Targert       There were no vitals taken for this visit. There is no height or weight on file to calculate BMI.    Patient Active Problem List   Diagnosis     Cirrhosis of liver (H)     Liver lesion     HCC (hepatocellular carcinoma) (H)     Liver transplant recipient (H)     Immunosuppressed status (H)     Hypervolemia     Atrial fibrillation with rapid ventricular response (H)     Hematuria     Bilateral wheezing     Hypoxia     Hyperglycemia     Pre-diabetes     Essential hypertension     Constipation     Biliary stricture of transplanted liver (H)     Drug-induced diabetes mellitus (H)     Muscle tension dysphonia     Laennec's cirrhosis (H)       No Known Allergies    Current Outpatient Medications   Medication Sig Dispense Refill     alfuzosin ER (UROXATRAL) 10 MG 24 hr tablet Take 1 tablet (10 mg) by mouth daily 90 tablet 3     amLODIPine (NORVASC) 10 MG tablet Take 1 tablet (10 mg) by mouth daily 30 tablet 11     aspirin (ASA) 325 MG EC tablet Take 1 tablet (325 mg) by mouth daily 30 tablet 11     blood glucose (NO BRAND SPECIFIED) lancets standard Use to test blood sugar 4 times daily or as directed. 200 each 11     blood glucose (ONETOUCH VERIO IQ) test strip Use to test blood sugar 4 times daily or as directed. 200 strip 11     gabapentin (NEURONTIN) 100 MG capsule Take 1 capsule (100 mg) by mouth 3 times daily Start 100 mg at bedtime for 1-2 weeks, then 2x daily for 1-2 weeks, then 3x daily thereafter. 90 capsule 3     metFORMIN (GLUCOPHAGE-XR) 500 MG 24 hr tablet Take 4 tablets (2,000 mg) by mouth daily (with dinner) (Patient taking differently: Take 1,500 mg by mouth daily (with dinner) ) 120 tablet 3     metoprolol tartrate 75 MG TABS Take 75 mg by mouth 2 times daily 60 tablet 11     multivitamin w/minerals (THERA-VIT-M) tablet Take 1 tablet by mouth daily 90  tablet 3     Sharps Container MISC 1 each daily 1 each 2     tacrolimus (GENERIC EQUIVALENT) 1 MG capsule Take 5 capsules (5 mg) by mouth every 12 hours 300 capsule 11     ursodiol (ACTIGALL) 250 MG tablet Take 250 mg by mouth 2 times daily       glucose 40 % (400 mg/mL) GEL gel Take 15-30 g by mouth every 15 minutes as needed for low blood sugar (Patient not taking: Reported on 2019) 30 g 3     urea (GORMEL) 20 % external cream Apply as a moisturizer to your whole body everyday. (Patient not taking: Reported on 2019) 480 g 5       Social History     Tobacco Use     Smoking status: Former Smoker     Packs/day: 0.03     Years: 20.00     Pack years: 0.60     Types: Cigarettes, Cigars     Start date: 1970     Last attempt to quit: 3/14/2018     Years since quittin.3     Smokeless tobacco: Never Used     Tobacco comment: Quit smoking 2018, one cigarette after dinner   Substance Use Topics     Alcohol use: No     Comment: Quit drinking 2018     Drug use: No       Patient states he did all biopsy prep accordingly, including taking the antibiotics as directed, stopping blood thinners, and completing the enema shortly prior to his appointment.    Invasive Procedure Safety Checklist:    Procedure: Prostate Biopsy    Action: Complete sections and checkboxes as appropriate.    Pre-procedure:  1. Patient ID Verified with 2 identifiers (Monique and  or MRN) : YES    2. Procedure and site verified with patient/designee (when able) : YES    3. Accurate consent documentation in medical record : YES    4. H&P (or appropriate assessment) documented in medical record : N/A  H&P must be up to 30 days prior to procedure an updated within 24 hours of                 Procedure as applicable.     5. Relevant diagnostic and radiology test results appropriately labeled and displayed as applicable : YES    6. Blood products, implants, devices, and/or special equipment available for the procedure as applicable :  YES    7. Procedure site(s) marked with provider initials [Exclusions: none] : NO    8. Marking not required. Reason : Yes  Procedure does not require site marking    Time Out:     Time-Out performed immediately prior to starting procedure, including verbal and active participation of all team members addressing: YES    1. Correct patient identity.  2. Confirmed that the correct side and site are marked.  3. An accurate procedure to be done.  4. Agreement on the procedure to be done.  5. Correct patient position.  6. Relevant images and results are properly labeled and appropriately displayed.  7. The need to administer antibiotics or fluids for irrigation purposes during the procedure as applicable.  8. Safety precautions based on patient history or medication use.    During Procedure: Verification of correct person, site, and procedure occurs any time the responsibility for care of the patient is transferred to another member of the care team.    The following medication was given:     MEDICATION:  Lidocaine 1%  ROUTE: Provider Administered  SITE: Provider Administered  DOSE: 10mL  LOT #: [id number]  :[employer]  EXPIRATION DATE: 04/23  NDC#: [address]-12   Was there drug waste? Yes  Amount of drug waste (10mL): 20mL.  Reason for waste:  As per MD    Prior to injection, verified patient identity using patient's name and date of birth.  Due to injection administration, patient instructed to remain in clinic for 15 minutes  afterwards, and to report any adverse reaction to me immediately.    Drug Amount Wasted:  Yes: 10 mg/ml   Vial/Syringe: Single dose vial    The following medication was given:     MEDICATION:  Gentamicin    ROUTE: IM  SITE: RUQ - Gluteus  DOSE: 80mg  LOT #: [id number]  : [employer]  EXPIRATION DATE: 07/20  NDC#: [address]-95   Was there drug waste? No    Prior to injection, verified patient identity using patient's name and date of birth.  Due to injection  administration, patient instructed to remain in clinic for 15 minutes  afterwards, and to report any adverse reaction to me immediately.    Drug Amount Wasted:  None.  Vial/Syringe: Single dose vial    Rodney Koenig, MINERVA  July 26, 2019

## 2019-07-26 NOTE — PATIENT INSTRUCTIONS
Please see the attached instructions regarding your Post Biopsy Care, and make sure you have scheduled or will schedule your Biopsy Follow Up.    It was a pleasure meeting with you today.  Thank you for allowing me and my team the privilege of caring for you today. YOU are the reason we are here, and I truly hope we provided you with the excellent service you deserve.  Please let us know if there is anything else we can do for you so that we can be sure you are leaving completely satisfied with your care experience.      Rodney Koenig, EMT.            [place] [facility]  Precautions Following a Prostate Biopsy    There are four conditions that you should watch for after a prostate biopsy:    1. Excessive pain  2. Bleeding irregularities (passing numerous  dime sized  clots or if your urine looks like cranberry juice)  3. Fever of 100 degrees or more  4. If you are unable to urinate        If any of these occur, call the Urology Clinic during normal business hours (M-F, 8:00-4:30) at [phone].  If you experience a problem after normal business hours, call our 24-hour phone number at [phone] and ask for the Urology Resident on call to be paged.      If you experience any discomfort following the biopsy, you may take Tylenol.  DO NOT TAKE ASPIRIN unless specified by your physician.   If the discomfort becomes severe or uncontrolled by medication, contact the Urology Clinic or Urology Resident (after normal business hours).      Do not be alarmed if you have some blood in your stool, in your urine, or ejaculate (semen).  This occurrence is normal and may last up to three (3) or four (4) days, usually intermittently.  Blood in the ejaculate (semen) may last several weeks, up to about a dozen ejaculations.  The blood in your ejaculate may appear as brown streaks, blood tinged, and immediately following a biopsy, it may appear bright red.      If you run a fever above 100 degrees, call  the Urology Clinic or Urology Resident (after normal business hours) immediately.  If you are unable to reach your physician or the Resident on call, go to the nearest emergency room.  Explain that you have had a transrectal biopsy of your prostate and what problems you are experiencing.        You should attempt to urinate following your biopsy before you leave the clinic.  If you are unable to urinate four (4) to six (6) hours after you leave the clinic, you will need to contact the Urology Clinic or the Resident on call.  If you are unable to reach your physician or the Resident on call, go to the nearest emergency room.      If you have any questions or concerns after your biopsy, feel free to contact the Urology Clinic at [phone] during M-F, 8:00-5pm business hours.  If you need to speak with someone after normal business hours, call [phone] and ask for the Resident on call to be paged.

## 2019-07-26 NOTE — PROGRESS NOTES
Here for an MRI-ultrasound-fusion guided biopsy of the prostate    We previously obtained an MRI of the prostate and identified all PIRADS 4-5 lesions for targeting    Target Lesion #1 6 o'clock       Prostate volume on MRI 45 grams    PSA   Date Value Ref Range Status   04/26/2019 5.51 (H) 0 - 4 ug/L Final     Comment:     Assay Method:  Chemiluminescence using Siemens Vista analyzer   03/19/2019 5.02 (H) 0 - 4 ug/L Final     Comment:     Assay Method:  Chemiluminescence using Siemens Vista analyzer   03/04/2019 5.25 (H) 0 - 4 ug/L Final     Comment:     Assay Method:  Chemiluminescence using Siemens Vista analyzer   08/15/2018 3.27 0 - 4 ug/L Final     Comment:     Assay Method:  Chemiluminescence using Siemens Vista analyzer       An enema was completed and 500 mg of Cipro was given prior to the biopsy.  After obtaining informed consent patient was placed in lateral decubitus position.  The ultrasound probe was placed in the rectum.  The prostate was numbed using ultrasound guidance with 1% lidocaine 5 mls along each nerve bundle.  US images were obtained and then fused with the MRI images.  The fused images were then used to guide the biopsy of the targeted lesions with at least 2 cores taken of each lesion.  We then performed random biopsies.  12 cores were taken with 6 on each side, base, mid and apex.  The patient tolerated the procedure well.        Follow up in one week for results

## 2019-07-31 LAB — COPATH REPORT: NORMAL

## 2019-08-01 ENCOUNTER — PRE VISIT (OUTPATIENT)
Dept: UROLOGY | Facility: CLINIC | Age: 64
End: 2019-08-01

## 2019-08-01 NOTE — TELEPHONE ENCOUNTER
Chief Complaint : Return-Bx Results    Hx/Sx: Prostate Biopsy Results     Records/Orders: Available     Pt Contacted: N/A    At Rooming: Normal

## 2019-08-02 ENCOUNTER — OFFICE VISIT (OUTPATIENT)
Dept: UROLOGY | Facility: CLINIC | Age: 64
End: 2019-08-02
Payer: COMMERCIAL

## 2019-08-02 VITALS
WEIGHT: 184.7 LBS | SYSTOLIC BLOOD PRESSURE: 151 MMHG | HEART RATE: 51 BPM | DIASTOLIC BLOOD PRESSURE: 77 MMHG | BODY MASS INDEX: 28.99 KG/M2 | HEIGHT: 67 IN

## 2019-08-02 DIAGNOSIS — C61 PROSTATE CANCER (H): Primary | ICD-10-CM

## 2019-08-02 ASSESSMENT — MIFFLIN-ST. JEOR: SCORE: 1576.42

## 2019-08-02 ASSESSMENT — PAIN SCALES - GENERAL: PAINLEVEL: NO PAIN (0)

## 2019-08-02 NOTE — PROGRESS NOTES
New CaP Diagnosis    64 year old yo male returns today to discuss his recent TRUS bx results.     Staging: cT1c   Gleasons Score: 3+4     Patient with history of cirrhosis and HCC s/p liver transplant.     Lab Results       Component                Value               Date                       PSA                      5.51                2019                 PSA                      5.02                2019                 PSA                      5.25                2019                 PSA                      3.27                08/15/2018                 PSA                      4.32                2018               PMH:  Past Medical History:  2018: Biliary stricture of transplanted liver (H)  No date: Cancer (H)      Comment:  hepatocellualr carcinoma  2018: Cirrhosis of liver (H)  No date: Enlarged prostate  No date: Inguinal hernia      Comment:  Repaired with mesh on 18: Liver lesion  2018: Liver transplanted (H)      Comment:   donor liver transplant  No date: Portal vein thrombosis      Comment:  on path explant  2018: Postoperative atrial fibrillation (H)  2018: Pre-diabetes     PSH:  Past Surgical History:  No date: APPENDECTOMY  No date: COLONOSCOPY      Comment:  2018: ENDOSCOPIC RETROGRADE CHOLANGIOPANCREATOGRAM; N/A      Comment:  Procedure: ENDOSCOPIC RETROGRADE CHOLANGIOPANCREATOGRAM,               with Billary Sphincterotomy and Billary Stent Placement;                Surgeon: Omero Lawler MD;  Location:  OR  2019: ENDOSCOPIC RETROGRADE CHOLANGIOPANCREATOGRAM      Comment:  Procedure: COMBINED ENDOSCOPIC RETROGRADE                CHOLANGIOPANCREATOGRAPHY, Bile Duct Stent Exchange;                 Surgeon: Omero Lawler MD;  Location:  OR  2019: ENDOSCOPIC RETROGRADE CHOLANGIOPANCREATOGRAM; N/A      Comment:  Procedure: ENDOSCOPIC RETROGRADE                 "CHOLANGIOPANCREATOGRAPHY, WITH BILIARY STENT REMOVAL AND                STONE EXTRACTION;  Surgeon: Omero Lawler MD;                 Location:  OR  2018: HERNIORRHAPHY INGUINAL      Comment:  Procedure: Herniorrhaphy inguinal;  Surgeon:                Emil Echevarria MD;  Location:  OR  2018: IR LIVER BIOPSY PERCUTANEOUS  2018: TRANSPLANT LIVER RECIPIENT  DONOR; N/A      Comment:  Procedure: TRANSPLANT LIVER RECIPIENT  DONOR;                 Surgeon: Emil Echevarria MD;  Location:  OR      Height: 5' 7\"  Weight: 184 lbs 11.2 oz           Assessment: 64 year old  y/o male with newly diagnosed CaP       Plan:   - Discussed treatment options with patient including cryotherapy, brachytherapy,    open radical prostatectomy, laparoscopic prostatectomy, external beam radiation,    hormonal therapy, and watchful waiting.  - He was told the various common complications and long term effects of each   treatment option. He had the opportunity to ask questions and they were   appropriately answered.   - Will have patient meet with radiation/oncology to further discuss possible   radiation treatment options  -Will have patient meet with Dr. Casiano to review option of robot-assisted laparoscopic prostatectomy         Patient would be best candidate for: robot-assisted laparoscopic prostatectomy vs EBRT     Total time of visit was 15 min, greater than half the time spent counseling.      "

## 2019-08-02 NOTE — LETTER
2019       RE: Loy Limon  [address]  [facility] [address]     Dear Colleague,    Thank you for referring your patient, Loy Limon, to the [facility] AND [facility] FOR PROSTATE AND UROLOGIC CANCERS at [facility]. Please see a copy of my visit note below.            New CaP Diagnosis    64 year old yo male returns today to discuss his recent TRUS bx results.     Staging: cT1c   Gleasons Score: 3+4     Patient with history of cirrhosis and HCC s/p liver transplant.     Lab Results       Component                Value               Date                       PSA                      5.51                2019                 PSA                      5.02                2019                 PSA                      5.25                2019                 PSA                      3.27                08/15/2018                 PSA                      4.32                2018               PMH:  Past Medical History:  2018: Biliary stricture of transplanted liver (H)  No date: Cancer (H)      Comment:  hepatocellualr carcinoma  2018: Cirrhosis of liver (H)  No date: Enlarged prostate  No date: Inguinal hernia      Comment:  Repaired with mesh on 18: Liver lesion  2018: Liver transplanted (H)      Comment:   donor liver transplant  No date: Portal vein thrombosis      Comment:  on path explant  2018: Postoperative atrial fibrillation (H)  2018: Pre-diabetes     PSH:  Past Surgical History:  No date: APPENDECTOMY  No date: COLONOSCOPY      Comment:  2018: ENDOSCOPIC RETROGRADE CHOLANGIOPANCREATOGRAM; N/A      Comment:  Procedure: ENDOSCOPIC RETROGRADE CHOLANGIOPANCREATOGRAM,               with Billary Sphincterotomy and Billary Stent Placement;                Surgeon: Omero Lawler MD;  Location:  OR  2019: ENDOSCOPIC RETROGRADE  "CHOLANGIOPANCREATOGRAM      Comment:  Procedure: COMBINED ENDOSCOPIC RETROGRADE                CHOLANGIOPANCREATOGRAPHY, Bile Duct Stent Exchange;                 Surgeon: Omero Lawler MD;  Location:  OR  2019: ENDOSCOPIC RETROGRADE CHOLANGIOPANCREATOGRAM; N/A      Comment:  Procedure: ENDOSCOPIC RETROGRADE                CHOLANGIOPANCREATOGRAPHY, WITH BILIARY STENT REMOVAL AND                STONE EXTRACTION;  Surgeon: Omero Lawler MD;                 Location:  OR  2018: HERNIORRHAPHY INGUINAL      Comment:  Procedure: Herniorrhaphy inguinal;  Surgeon:                Emil Echevarria MD;  Location:  OR  2018: IR LIVER BIOPSY PERCUTANEOUS  2018: TRANSPLANT LIVER RECIPIENT  DONOR; N/A      Comment:  Procedure: TRANSPLANT LIVER RECIPIENT  DONOR;                 Surgeon: Emil Echevarria MD;  Location:  OR      Height: 5' 7\"  Weight: 184 lbs 11.2 oz           Assessment: 64 year old  y/o male with newly diagnosed CaP       Plan:   - Discussed treatment options with patient including cryotherapy, brachytherapy,    open radical prostatectomy, laparoscopic prostatectomy, external beam radiation,    hormonal therapy, and watchful waiting.  - He was told the various common complications and long term effects of each   treatment option. He had the opportunity to ask questions and they were   appropriately answered.   - Will have patient meet with radiation/oncology to further discuss possible   radiation treatment options  -Will have patient meet with Dr. Casiano to review option of robot-assisted laparoscopic prostatectomy         Patient would be best candidate for: robot-assisted laparoscopic prostatectomy vs EBRT     Total time of visit was 15 min, greater than half the time spent counseling.        Again, thank you for allowing me to participate in the care of your patient.      Sincerely,    Khloe Torres MD      "

## 2019-08-06 ENCOUNTER — PRE VISIT (OUTPATIENT)
Dept: UROLOGY | Facility: CLINIC | Age: 64
End: 2019-08-06

## 2019-08-06 ENCOUNTER — TELEPHONE (OUTPATIENT)
Dept: TRANSPLANT | Facility: CLINIC | Age: 64
End: 2019-08-06

## 2019-08-06 NOTE — TELEPHONE ENCOUNTER
Prior Authorization Specialty Medication Request    Medication/Dose: tacro  ICD code (if different than what is on RX):  Z94.4  Previously Tried and Failed:      Important Lab Values:   Rationale:     Insurance Name:   Insurance ID:   Insurance Phone Number:     Pharmacy Information (if different than what is on RX)  Name:  [facility]  Phone:  [phone]

## 2019-08-06 NOTE — TELEPHONE ENCOUNTER
Chief Complaint : review option of robot-assisted laparoscopic prostatectomy     Records/Orders: patient seen      Pt Contacted: no    At Rooming: normal

## 2019-08-12 NOTE — PROGRESS NOTES
RADIATION ONCOLOGY CONSULTATION  [facility]    DATE:  August 14, 2019    PATIENT NAME: Loy Limon  MEDICAL RECORD NUMBER: [id number]    REFERRING PHYSICIAN:  Dr. Torres    REASON FOR CONSULTATION: Consideration of  definitive radiotherapy for management of adenocarcinoma of the prostate.    Staging:  Clinical  T1c N0 M0  Augusta's score:   Augusta's score: 3+4=7  PSA:     Lab Results   Component Value Date    PSA 5.51 04/26/2019    PSA 5.02 03/19/2019    PSA 5.25 03/04/2019    PSA 3.27 08/15/2018    PSA 4.32 07/19/2018         PSA doubling time:  17 months    Prostate Volume:    45 cc (MRI)    NCCN :    Unfavorable intermediate risk(>50% cores+, Rebecca 4+3, 2 or more Intermediate risks)    ARLEEN RISK: Organ Confined(OC): 62%      Extra Prostattic Extension(EPE+): 29.6%      Seminal Vesicle Involvement(SV)+: 6.8%       Lymph Node Involvement (LN)+: 1.5%    JALEN RISK:  OC: 26%      EPE+: 73%     SV+: 12%      LN+: 8%        HISTORY OF PRESENT ILLNESS:   Mr. Limon is a 64 year old gentleman with a history of alcoholic cirrhosis and HCC s/p liver transplant in 12/2018 who was recently found to have intermediate risk prostate cancer.  He was initially seen by urology for obstructive urinary symptoms in July, 2018.  He was started on alfuzosin at that time and his LUTS improved.  His PSA was mildly elevated at 4.32 on 7/19/2018 but decreased to 3.27 after a short interval recheck on 8/15/2018.  He was therefore observed at that time.  PSA recheck on 3/4/2019 showed elevation to 5.25 and 2 additional rechecks on 3/19/2019 and 4/26/2019 were 5.02 and 5.51 respectively.  He therefore underwent a prostate MRI on 4/26/2019.  This was notable for a PI-RADS 5 lesion located at the posterior left apex peripheral zone.  There was capsular abutment with capsular irregularity greater than 15 mm which was highly suspicious for extracapsular extension.  There was no suspicious adenopathy or evidence  of pelvic metastases.    The patient subsequently underwent an MRI-fusion biopsy on 2019.  Pathology ([id number]) was notable for Rebecca 3+4=7 disease involving a single core taken from the left lateral apex.  In addition, there was extensive Rebecca 3+3=6 involving all other cores other than those taken from the right lateral apex and right base.  3/3 targeted biopsies taken from the site of the suspicious lesion on MRI were also positive for Rebecca 3+3=6 disease.  A total of 11/13 cores were therefore involved (targeted biopsies counted as one biopsy). The patient discussed various treatment options with Dr. Torres. He is referred to our department for further discussion of prostate cancer treatment options.     On exam today, Mr. Limon reports an AUA symptom score of 24/35.  He reports that he almost always has incomplete emptying, intermittency, and urinary urgency.  He also endorses being up approximately at least once every hour at night.  He is currently on Uroxatrol which was started by his PCP.  No dysuria or hematuria.  He reports some constipation but no hematochezia.  CONCEPCIÓN score is 2/25.  No recent fevers, chills, nausea, vomiting, weight loss, bone pain.  A complete review of systems was otherwise unremarkable.      PMH:   Past Medical History:   Diagnosis Date     Biliary stricture of transplanted liver (H) 2018     Cancer (H)     hepatocellualr carcinoma     Cirrhosis of liver (H) 2018     Enlarged prostate      Inguinal hernia     Repaired with mesh on 18     Liver lesion 2018     Liver transplanted (H) 2018     donor liver transplant     Portal vein thrombosis     on path explant     Postoperative atrial fibrillation (H) 2018     Pre-diabetes 2018       PSH:  Past Surgical History:   Procedure Laterality Date     APPENDECTOMY       COLONOSCOPY      2018     ENDOSCOPIC RETROGRADE CHOLANGIOPANCREATOGRAM N/A 2018    Procedure: ENDOSCOPIC  RETROGRADE CHOLANGIOPANCREATOGRAM, with Billary Sphincterotomy and Billary Stent Placement;  Surgeon: Omero Lawler MD;  Location: [facility] OR     ENDOSCOPIC RETROGRADE CHOLANGIOPANCREATOGRAM  2019    Procedure: COMBINED ENDOSCOPIC RETROGRADE CHOLANGIOPANCREATOGRAPHY, Bile Duct Stent Exchange;  Surgeon: Omero Lawler MD;  Location: [facility] OR     ENDOSCOPIC RETROGRADE CHOLANGIOPANCREATOGRAM N/A 2019    Procedure: ENDOSCOPIC RETROGRADE CHOLANGIOPANCREATOGRAPHY, WITH BILIARY STENT REMOVAL AND STONE EXTRACTION;  Surgeon: Omero Lawler MD;  Location: [facility] OR     HERNIORRHAPHY INGUINAL  2018    Procedure: Herniorrhaphy inguinal;  Surgeon: Emil Echevarria MD;  Location: [facility] OR     IR LIVER BIOPSY PERCUTANEOUS  2018     TRANSPLANT LIVER RECIPIENT  DONOR N/A 2018    Procedure: TRANSPLANT LIVER RECIPIENT  DONOR;  Surgeon: Emil Echevarria MD;  Location: [facility] OR       CHEMOTHERAPY HISTORY: None  RADIATION HISTORY: None  HISTORY OF IBD: None  IMPLANTABLE DEVICES: None    MEDICATIONS:  Current Outpatient Medications   Medication Sig Dispense Refill     alfuzosin ER (UROXATRAL) 10 MG 24 hr tablet Take 1 tablet (10 mg) by mouth daily 90 tablet 3     amLODIPine (NORVASC) 10 MG tablet Take 1 tablet (10 mg) by mouth daily 30 tablet 11     aspirin (ASA) 325 MG EC tablet Take 1 tablet (325 mg) by mouth daily 30 tablet 11     blood glucose (NO BRAND SPECIFIED) lancets standard Use to test blood sugar 4 times daily or as directed. 200 each 11     blood glucose (ONETOUCH VERIO IQ) test strip Use to test blood sugar 4 times daily or as directed. 200 strip 11     gabapentin (NEURONTIN) 100 MG capsule Take 1 capsule (100 mg) by mouth 3 times daily Start 100 mg at bedtime for 1-2 weeks, then 2x daily for 1-2 weeks, then 3x daily thereafter. 90 capsule 3     glucose 40 % (400 mg/mL) GEL gel Take 15-30 g by mouth every 15 minutes as needed for low blood sugar (Patient not  taking: Reported on 6/26/2019) 30 g 3     metFORMIN (GLUCOPHAGE-XR) 500 MG 24 hr tablet Take 4 tablets (2,000 mg) by mouth daily (with dinner) (Patient taking differently: Take 1,500 mg by mouth daily (with dinner) ) 120 tablet 3     metoprolol tartrate 75 MG TABS Take 75 mg by mouth 2 times daily 60 tablet 11     multivitamin w/minerals (THERA-VIT-M) tablet Take 1 tablet by mouth daily 90 tablet 3     Sharps Container MISC 1 each daily 1 each 2     tacrolimus (GENERIC EQUIVALENT) 1 MG capsule Take 5 capsules (5 mg) by mouth every 12 hours 300 capsule 11     urea (GORMEL) 20 % external cream Apply as a moisturizer to your whole body everyday. (Patient not taking: Reported on 6/26/2019) 480 g 5     ursodiol (ACTIGALL) 250 MG tablet Take 250 mg by mouth 2 times daily         ALLERGY:  No Known Allergies    FAMILY HISTORY:  No family history of cancer    SOCIAL HISTORY: . Lives in [place]. 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 Former smoker with about 1 pack year, quit 2/2018. Former heavy EtOH but none since 2/2018.     ROS: 10 point ROS reviewed as reported on the nursing assessment note.      PHYSICAL EXAMINATION:  VS: Vitals: BP (!) 143/75   Pulse 52   Wt 84.1 kg (185 lb 8 oz)   BMI 29.05 kg/m    BMI= Body mass index is 29.05 kg/m .  General: Alert, oriented, NAD  Skin: No rashes or lesions appreciated  HEENT: NCAT, EOMI  Neck: Supple, full ROM  Breasts: Deferred  Heart: WWP  Lungs: Breathing comfortably on RA  Abd: Nondistended  /Rectal: Normal rectal tone. Prostate is smooth without masses or nodularity.   Ext: Atraumatic, grossly intact strength  Neuro: CNs grossly intact, normal gait    ECOG PS: 0    RADIOLOGY:  MRI OF PELVIS (4/26/19):  IMPRESSION:  1. Based on the most suspicious abnormality, this exam is  characterized as PIRADS 5 - Clinically significant cancer is highly  likely to be present.The most suspicious abnormality is located at  the posterior left apex peripheral zone and  there is high suspicion of  extraprostatic extension.  2. No suspicious adenopathy or evidence of pelvic metastases.    BONE SCAN:  N/A    CT SCAN:  N/A      IMPRESSION:  Unfavorable intermediate risk(>50% cores+, Rebecca 4+3, 2 or more Intermediate risks) adenocarcinoma of prostate.     RECOMMENDATION:   We had a detailed discussion with Mr. Limon regarding the role of radiotherapy in the overall management of his unfavorable intermediate risk prostate cancer.  We discussed that we recommend definitive treatment at this time as opposed to active surveillance given the presence of unfavorable intermediate risk disease.  We discussed that definitive treatment options include surgery versus EBRT plus short-term ADT (6 months).  These treatment options are generally felt to be equivalent in terms of oncologic outcome but differ in their side effect profiles.  We reviewed the logistics as well as expected acute and potential late side effects of radiotherapy in detail with the patient.  We also discussed the toxicity associated with ADT.  We compared and contrasted these toxicities with those of radical prostatectomy.  The patient had a number of questions which were answered to the best of our ability.  The patient has concerns regarding the toxicities of both prostatectomy and EBRT plus ADT.  He has an upcoming appointment with Dr. Casiano to discuss radical prostatectomy in more detail prior to making final treatment decision.  In the event that he opts for definitive treatment with EBRT and short-term ADT, we will make arrangements at that time to start ADT and have fiducial markers placed.    The patient was seen and discussed with staff, Dr. Cerda.    Xander Valero MD  Resident, PGY-5  Department of Radiation Oncology  [facility]  Pager: [phone]      I saw the patient with the resident.  I agree with the resident's note and plan of care.      ÁNGEL Cerda M.D.  Department of Radiation  Oncology  [facility]

## 2019-08-14 ENCOUNTER — OFFICE VISIT (OUTPATIENT)
Dept: RADIATION ONCOLOGY | Facility: CLINIC | Age: 64
End: 2019-08-14
Attending: RADIOLOGY
Payer: COMMERCIAL

## 2019-08-14 VITALS
SYSTOLIC BLOOD PRESSURE: 143 MMHG | HEART RATE: 52 BPM | WEIGHT: 185.5 LBS | DIASTOLIC BLOOD PRESSURE: 75 MMHG | BODY MASS INDEX: 29.05 KG/M2

## 2019-08-14 DIAGNOSIS — C61 MALIGNANT NEOPLASM OF PROSTATE (H): Primary | ICD-10-CM

## 2019-08-14 PROCEDURE — G0463 HOSPITAL OUTPT CLINIC VISIT: HCPCS | Performed by: RADIOLOGY

## 2019-08-14 ASSESSMENT — ENCOUNTER SYMPTOMS
NERVOUS/ANXIOUS: 0
WHEEZING: 0
SORE THROAT: 0
HEADACHES: 1
BLURRED VISION: 0
DEPRESSION: 0
FREQUENCY: 1
HEMATURIA: 0
BACK PAIN: 0
NAUSEA: 0
COUGH: 0
DIARRHEA: 0
CONSTIPATION: 1
HEARTBURN: 0
CHILLS: 0
FEVER: 0
ABDOMINAL PAIN: 0
WEIGHT LOSS: 0
SHORTNESS OF BREATH: 0
VOMITING: 0
NECK PAIN: 0

## 2019-08-14 NOTE — PROGRESS NOTES
HPI    INITIAL PATIENT ASSESSMENT    Diagnosis: prostate cancer    Prior radiation therapy: None    Prior chemotherapy: None    Prior hormonal therapy:No    Pain Eval:  Denies    Psychosocial  Living arrangements: wife  Fall Risk: independent   referral needs:     Advanced Directive: No  Implantable Cardiac Device? No    Onset of menarche:   LMP: No LMP for male patient.  Onset of menopause:   Abnormal vaginal bleeding/discharge: No  Are you pregnant? No  Reproductive note: grown children    Nurse face-to-face time: Level 5:  over 15 min face to face time  Review of Systems   Constitutional: Negative for chills, fever, malaise/fatigue and weight loss.   HENT: Negative for congestion, hearing loss, sore throat and tinnitus.    Eyes: Negative for blurred vision.   Respiratory: Negative for cough, shortness of breath and wheezing.    Cardiovascular: Negative for chest pain and leg swelling.   Gastrointestinal: Positive for constipation. Negative for abdominal pain, diarrhea, heartburn, nausea and vomiting.   Genitourinary: Positive for frequency and urgency. Negative for hematuria.   Musculoskeletal: Negative for back pain and neck pain.   Skin: Negative for itching and rash.   Neurological: Positive for headaches.   Endo/Heme/Allergies: Negative for environmental allergies.   Psychiatric/Behavioral: Negative for depression. The patient is not nervous/anxious.

## 2019-08-14 NOTE — LETTER
8/14/2019       RE: Loy Limon  [address]  [facility] [address]     Dear Colleague,    Thank you for referring your patient, Loy Limon, to the RADIATION ONCOLOGY CLINIC. Please see a copy of my visit note below.      RADIATION ONCOLOGY CONSULTATION  [facility]    DATE:  August 14, 2019    PATIENT NAME: Loy Limon  MEDICAL RECORD NUMBER: [id number]    REFERRING PHYSICIAN:  Dr. Torres    REASON FOR CONSULTATION: Consideration of  definitive radiotherapy for management of adenocarcinoma of the prostate.    Staging:  Clinical  T1c N0 M0  Rebecca's score:   Rebecca's score: 3+4=7  PSA:     Lab Results   Component Value Date    PSA 5.51 04/26/2019    PSA 5.02 03/19/2019    PSA 5.25 03/04/2019    PSA 3.27 08/15/2018    PSA 4.32 07/19/2018         PSA doubling time:  17 months    Prostate Volume:    45 cc (MRI)    NCCN :    Unfavorable intermediate risk(>50% cores+, [place] 4+3, 2 or more Intermediate risks)    ARLEEN RISK: Organ Confined(OC): 62%      Extra Prostattic Extension(EPE+): 29.6%      Seminal Vesicle Involvement(SV)+: 6.8%       Lymph Node Involvement (LN)+: 1.5%    JALEN RISK:  OC: 26%      EPE+: 73%     SV+: 12%      LN+: 8%        HISTORY OF PRESENT ILLNESS:   Mr. Limon is a 64 year old gentleman with a history of alcoholic cirrhosis and HCC s/p liver transplant in 12/2018 who was recently found to have intermediate risk prostate cancer.  He was initially seen by urology for obstructive urinary symptoms in July, 2018.  He was started on alfuzosin at that time and his LUTS improved.  His PSA was mildly elevated at 4.32 on 7/19/2018 but decreased to 3.27 after a short interval recheck on 8/15/2018.  He was therefore observed at that time.  PSA recheck on 3/4/2019 showed elevation to 5.25 and 2 additional rechecks on 3/19/2019 and 4/26/2019 were 5.02 and 5.51 respectively.  He therefore underwent a prostate MRI on 4/26/2019.  This was notable for a  PI-RADS 5 lesion located at the posterior left apex peripheral zone.  There was capsular abutment with capsular irregularity greater than 15 mm which was highly suspicious for extracapsular extension.  There was no suspicious adenopathy or evidence of pelvic metastases.    The patient subsequently underwent an MRI-fusion biopsy on 2019.  Pathology ([id number]) was notable for [place] 3+4=7 disease involving a single core taken from the left lateral apex.  In addition, there was extensive [place] 3+3=6 involving all other cores other than those taken from the right lateral apex and right base.  3/3 targeted biopsies taken from the site of the suspicious lesion on MRI were also positive for [place] 3+3=6 disease.  A total of 11/13 cores were therefore involved (targeted biopsies counted as one biopsy). The patient discussed various treatment options with Dr. Torres. He is referred to our department for further discussion of prostate cancer treatment options.     On exam today, Mr. Limon reports an AUA symptom score of 24/35.  He reports that he almost always has incomplete emptying, intermittency, and urinary urgency.  He also endorses being up approximately at least once every hour at night.  He is currently on Uroxatrol which was started by his PCP.  No dysuria or hematuria.  He reports some constipation but no hematochezia.  CONCEPCIÓN score is 2/25.  No recent fevers, chills, nausea, vomiting, weight loss, bone pain.  A complete review of systems was otherwise unremarkable.      PMH:   Past Medical History:   Diagnosis Date     Biliary stricture of transplanted liver (H) 2018     Cancer (H)     hepatocellualr carcinoma     Cirrhosis of liver (H) 2018     Enlarged prostate      Inguinal hernia     Repaired with mesh on 18     Liver lesion 2018     Liver transplanted (H) 2018     donor liver transplant     Portal vein thrombosis     on path explant     Postoperative atrial  fibrillation (H) 2018     Pre-diabetes 2018       PSH:  Past Surgical History:   Procedure Laterality Date     APPENDECTOMY       COLONOSCOPY      2018     ENDOSCOPIC RETROGRADE CHOLANGIOPANCREATOGRAM N/A 2018    Procedure: ENDOSCOPIC RETROGRADE CHOLANGIOPANCREATOGRAM, with Billary Sphincterotomy and Billary Stent Placement;  Surgeon: Omero Lawler MD;  Location: [facility] OR     ENDOSCOPIC RETROGRADE CHOLANGIOPANCREATOGRAM  2019    Procedure: COMBINED ENDOSCOPIC RETROGRADE CHOLANGIOPANCREATOGRAPHY, Bile Duct Stent Exchange;  Surgeon: Omero Lawler MD;  Location: [facility] OR     ENDOSCOPIC RETROGRADE CHOLANGIOPANCREATOGRAM N/A 2019    Procedure: ENDOSCOPIC RETROGRADE CHOLANGIOPANCREATOGRAPHY, WITH BILIARY STENT REMOVAL AND STONE EXTRACTION;  Surgeon: Omero Lawler MD;  Location: [facility] OR     HERNIORRHAPHY INGUINAL  2018    Procedure: Herniorrhaphy inguinal;  Surgeon: Emil Echevarria MD;  Location: [facility] OR     IR LIVER BIOPSY PERCUTANEOUS  2018     TRANSPLANT LIVER RECIPIENT  DONOR N/A 2018    Procedure: TRANSPLANT LIVER RECIPIENT  DONOR;  Surgeon: Emil Echevarria MD;  Location: [facility] OR       CHEMOTHERAPY HISTORY: None  RADIATION HISTORY: None  HISTORY OF IBD: None  IMPLANTABLE DEVICES: None    MEDICATIONS:  Current Outpatient Medications   Medication Sig Dispense Refill     alfuzosin ER (UROXATRAL) 10 MG 24 hr tablet Take 1 tablet (10 mg) by mouth daily 90 tablet 3     amLODIPine (NORVASC) 10 MG tablet Take 1 tablet (10 mg) by mouth daily 30 tablet 11     aspirin (ASA) 325 MG EC tablet Take 1 tablet (325 mg) by mouth daily 30 tablet 11     blood glucose (NO BRAND SPECIFIED) lancets standard Use to test blood sugar 4 times daily or as directed. 200 each 11     blood glucose (ONETOUCH VERIO IQ) test strip Use to test blood sugar 4 times daily or as directed. 200 strip 11     gabapentin (NEURONTIN) 100 MG capsule Take 1 capsule (100 mg) by  mouth 3 times daily Start 100 mg at bedtime for 1-2 weeks, then 2x daily for 1-2 weeks, then 3x daily thereafter. 90 capsule 3     glucose 40 % (400 mg/mL) GEL gel Take 15-30 g by mouth every 15 minutes as needed for low blood sugar (Patient not taking: Reported on 6/26/2019) 30 g 3     metFORMIN (GLUCOPHAGE-XR) 500 MG 24 hr tablet Take 4 tablets (2,000 mg) by mouth daily (with dinner) (Patient taking differently: Take 1,500 mg by mouth daily (with dinner) ) 120 tablet 3     metoprolol tartrate 75 MG TABS Take 75 mg by mouth 2 times daily 60 tablet 11     multivitamin w/minerals (THERA-VIT-M) tablet Take 1 tablet by mouth daily 90 tablet 3     Sharps Container MISC 1 each daily 1 each 2     tacrolimus (GENERIC EQUIVALENT) 1 MG capsule Take 5 capsules (5 mg) by mouth every 12 hours 300 capsule 11     urea (GORMEL) 20 % external cream Apply as a moisturizer to your whole body everyday. (Patient not taking: Reported on 6/26/2019) 480 g 5     ursodiol (ACTIGALL) 250 MG tablet Take 250 mg by mouth 2 times daily         ALLERGY:  No Known Allergies    FAMILY HISTORY:  No family history of cancer    SOCIAL HISTORY: . Lives in [place]. 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 Former smoker with about 1 pack year, quit 2/2018. Former heavy EtOH but none since 2/2018.     ROS: 10 point ROS reviewed as reported on the nursing assessment note.      PHYSICAL EXAMINATION:  VS: Vitals: BP (!) 143/75   Pulse 52   Wt 84.1 kg (185 lb 8 oz)   BMI 29.05 kg/m     BMI= Body mass index is 29.05 kg/m .  General: Alert, oriented, NAD  Skin: No rashes or lesions appreciated  HEENT: NCAT, EOMI  Neck: Supple, full ROM  Breasts: Deferred  Heart: WWP  Lungs: Breathing comfortably on RA  Abd: Nondistended  /Rectal: Normal rectal tone. Prostate is smooth without masses or nodularity.   Ext: Atraumatic, grossly intact strength  Neuro: CNs grossly intact, normal gait    ECOG PS: 0    RADIOLOGY:  MRI OF PELVIS  (4/26/19):  IMPRESSION:  1. Based on the most suspicious abnormality, this exam is  characterized as PIRADS 5 - Clinically significant cancer is highly  likely to be present.The most suspicious abnormality is located at  the posterior left apex peripheral zone and there is high suspicion of  extraprostatic extension.  2. No suspicious adenopathy or evidence of pelvic metastases.    BONE SCAN:  N/A    CT SCAN:  N/A      IMPRESSION:  Unfavorable intermediate risk(>50% cores+, [place] 4+3, 2 or more Intermediate risks) adenocarcinoma of prostate.     RECOMMENDATION:   We had a detailed discussion with Mr. Limon regarding the role of radiotherapy in the overall management of his unfavorable intermediate risk prostate cancer.  We discussed that we recommend definitive treatment at this time as opposed to active surveillance given the presence of unfavorable intermediate risk disease.  We discussed that definitive treatment options include surgery versus EBRT plus short-term ADT (6 months).  These treatment options are generally felt to be equivalent in terms of oncologic outcome but differ in their side effect profiles.  We reviewed the logistics as well as expected acute and potential late side effects of radiotherapy in detail with the patient.  We also discussed the toxicity associated with ADT.  We compared and contrasted these toxicities with those of radical prostatectomy.  The patient had a number of questions which were answered to the best of our ability.  The patient has concerns regarding the toxicities of both prostatectomy and EBRT plus ADT.  He has an upcoming appointment with Dr. Casiano to discuss radical prostatectomy in more detail prior to making final treatment decision.  In the event that he opts for definitive treatment with EBRT and short-term ADT, we will make arrangements at that time to start ADT and have fiducial markers placed.    The patient was seen and discussed with staff,   Prashant.    Xander Valero MD  Resident, PGY-5  Department of Radiation Oncology  [facility]  Pager: [phone]      I saw the patient with the resident.  I agree with the resident's note and plan of care.      ÁNGEL Cerda M.D.  Department of Radiation Oncology  [facility]          HPI    INITIAL PATIENT ASSESSMENT    Diagnosis: prostate cancer    Prior radiation therapy: None    Prior chemotherapy: None    Prior hormonal therapy:No    Pain Eval:  Denies    Psychosocial  Living arrangements: wife  Fall Risk: independent   referral needs:     Advanced Directive: No  Implantable Cardiac Device? No    Onset of menarche:   LMP: No LMP for male patient.  Onset of menopause:   Abnormal vaginal bleeding/discharge: No  Are you pregnant? No  Reproductive note: grown children    Nurse face-to-face time: Level 5:  over 15 min face to face time  Review of Systems   Constitutional: Negative for chills, fever, malaise/fatigue and weight loss.   HENT: Negative for congestion, hearing loss, sore throat and tinnitus.    Eyes: Negative for blurred vision.   Respiratory: Negative for cough, shortness of breath and wheezing.    Cardiovascular: Negative for chest pain and leg swelling.   Gastrointestinal: Positive for constipation. Negative for abdominal pain, diarrhea, heartburn, nausea and vomiting.   Genitourinary: Positive for frequency and urgency. Negative for hematuria.   Musculoskeletal: Negative for back pain and neck pain.   Skin: Negative for itching and rash.   Neurological: Positive for headaches.   Endo/Heme/Allergies: Negative for environmental allergies.   Psychiatric/Behavioral: Negative for depression. The patient is not nervous/anxious.                  Again, thank you for allowing me to participate in the care of your patient.      Sincerely,    Bo Cerda MD

## 2019-08-19 ENCOUNTER — OFFICE VISIT (OUTPATIENT)
Dept: UROLOGY | Facility: CLINIC | Age: 64
End: 2019-08-19
Payer: COMMERCIAL

## 2019-08-19 VITALS
HEIGHT: 67 IN | WEIGHT: 185 LBS | BODY MASS INDEX: 29.03 KG/M2 | HEART RATE: 51 BPM | DIASTOLIC BLOOD PRESSURE: 69 MMHG | SYSTOLIC BLOOD PRESSURE: 119 MMHG

## 2019-08-19 DIAGNOSIS — R30.0 DYSURIA: Primary | ICD-10-CM

## 2019-08-19 LAB
ALBUMIN UR-MCNC: NEGATIVE MG/DL
APPEARANCE UR: ABNORMAL
BILIRUB UR QL STRIP: NEGATIVE
COLOR UR AUTO: YELLOW
GLUCOSE UR STRIP-MCNC: NEGATIVE MG/DL
HGB UR QL STRIP: ABNORMAL
KETONES UR STRIP-MCNC: NEGATIVE MG/DL
LEUKOCYTE ESTERASE UR QL STRIP: NEGATIVE
MUCOUS THREADS #/AREA URNS LPF: PRESENT /LPF
NITRATE UR QL: NEGATIVE
PH UR STRIP: 5 PH (ref 5–7)
RBC #/AREA URNS AUTO: 21 /HPF (ref 0–2)
SOURCE: ABNORMAL
SP GR UR STRIP: 1.02 (ref 1–1.03)
SQUAMOUS #/AREA URNS AUTO: <1 /HPF (ref 0–1)
UROBILINOGEN UR STRIP-MCNC: 0 MG/DL (ref 0–2)
WBC #/AREA URNS AUTO: 1 /HPF (ref 0–5)

## 2019-08-19 ASSESSMENT — MIFFLIN-ST. JEOR: SCORE: 1577.78

## 2019-08-19 ASSESSMENT — PAIN SCALES - GENERAL: PAINLEVEL: NO PAIN (0)

## 2019-08-19 NOTE — LETTER
8/19/2019       RE: Loy Limon  [address]  [facility] [address]     Dear Colleague,    Thank you for referring your patient, Loy Limon, to the [facility] UROLOGY AND INST FOR PROSTATE AND UROLOGIC CANCERS at [facility]. Please see a copy of my visit note below.    Service Date: 08/19/2019      REASON FOR VISIT TODAY:  Prostate cancer.      HISTORY OF PRESENT ILLNESS:  Mr. Limon is a 64-year-old Egyptian-speaking gentleman who comes to see us for recent diagnosis of prostate cancer.  The patient had a biopsy on 07/26/2019 that showed Rebecca 3+4 equals 7, as well as multiple cores of [place] 3+3 equals 6 throughout his prostate.  The patient did have an MRI that did show some concern for possible extracapsular extension on the left side.  The patient has been counseled on various treatment options and recently saw Dr. Cerda in Radiation Oncology.  He comes in today to discuss options for surgery as well.  The patient notes that he has had some odor in his urine after the biopsy and notes he is not currently drinking much fluid.  He denies any burning with urination.      Also of note, the patient does have a history of liver transplant.  The patient's transplant was on 12/22/2018.      PHYSICAL EXAMINATION:   VITAL:  On exam his blood pressure is 119/69, pulse 51.  He is in no acute distress.   ABDOMEN:  Soft, nontender.  He has a big chevron incision from his liver transplant.  The patient also has a lower midline incision from what sounds like a possible exploratory laparotomy for an end-appendectomy, and there is also some question of a history of an abdominal infection at some point.      DATA:  The patient's pathology report from 07/26/2019 shows [place] 3+4 equals 7 in 60% of 1/1 core from the left lateral apex and again Rebecca 3+3 equals 6 in numerous cores including 1 core of 80% involved from the left apex.  The patient's prostate volume was  measured at 45 cc.      ASSESSMENT AND PLAN:  Over half of today's hour-long visit, which was conducted through an , was spent counseling the patient regarding his new diagnosis of prostate cancer.  I attempted to explain to Mr. Limon through the  the risks of surgery, which include urinary incontinence and erectile dysfunction.  We did have a hard time connecting through the  and a hard time coming to common understandings of the issue surrounding surgery as well as radiation, which we also discussed again.  I attempted to outline my concerns to the patient including the fact that given his previous abdominal incisions that a robotic approach may or may not be possible given a likelihood of significant adhesions from the operation that was performed through his lower midline incision.  We further discussed and clarified some of his conceptions regarding radiation and the fact that there is no difference in outcomes between radiation and surgery for at least 10 years in terms of prostate cancer-specific death.  I did suggest to the patient Dr. Rdoney Clarke's Guide to Surviving Prostate Cancer, which is an excellent resource for all patients with prostate cancer, and that we would try to find one for him in [place].  However, he notes he does not like to read too much and declined the offer for the book.  At this point, I counseled the patient that I still thought that there was a lot of information to attempt to convey to the patient regarding risks of surgery and radiation.  I did  him with regard to surgery that at a year from surgery, 90% of men would be dry, not needing to wear any pads on a daily basis.  The patient again did not seem to completely grasp this, however.  Therefore, I suggested to the patient that again further counseling in his primary language may be of some benefit.  We will discuss with Dr. Torres again to see if she might be able to explain some  of these issues to the patient more effectively in [place].  A urine was sent for UA today, given some of the patient's concerns about a possible infection.  The UA was significant for 21 red blood cells but only 1 white blood cell per high-power field, nitrite negative.  We will be happy to see Mr. Biggs back in the near future if he wishes to pursue surgery after further discussions, hopefully, with Dr. Torres in Radiation Oncology again to clarify the points of interest.         TEE DAWN MD             D: 2019   T: 2019   MT: DARWIN      Name:     DONNA BIGGS   MRN:      [id number]        Account:      [id number]   :      1955           Service Date: 2019      Document: [id number]

## 2019-08-19 NOTE — LETTER
August 27, 2019       TO: Loy Limon  [address]  [facility] [address]       DearMr.Braxton,    We are writing to inform you of your test results.    Your test results fall within the expected range(s) or remain unchanged from previous results.  Please continue with current treatment plan.    Resulted Orders   Routine UA with micro reflex to culture   Result Value Ref Range    Color Urine Yellow     Appearance Urine Slightly Cloudy     Glucose Urine Negative NEG^Negative mg/dL    Bilirubin Urine Negative NEG^Negative    Ketones Urine Negative NEG^Negative mg/dL    Specific Gravity Urine 1.017 1.003 - 1.035    Blood Urine Small (A) NEG^Negative    pH Urine 5.0 5.0 - 7.0 pH    Protein Albumin Urine Negative NEG^Negative mg/dL    Urobilinogen mg/dL 0.0 0.0 - 2.0 mg/dL    Nitrite Urine Negative NEG^Negative    Leukocyte Esterase Urine Negative NEG^Negative    Source Unspecified Urine     WBC Urine 1 0 - 5 /HPF    RBC Urine 21 (H) 0 - 2 /HPF    Squamous Epithelial /HPF Urine <1 0 - 1 /HPF    Mucous Urine Present (A) NEG^Negative /LPF       Thank you for choosing [facility] for your care. Please follow up as previously planned.  Please call with any questions or concerns.    Thank you,  Tahira Mcneill, RN Care Coordinator for  Dr. Kenrick Casiano

## 2019-08-19 NOTE — NURSING NOTE
Chief Complaint   Patient presents with     RECHECK     review option of robot-assisted laparoscopic prostatectomy        Belkys Reilly MA

## 2019-08-19 NOTE — PATIENT INSTRUCTIONS
Please make a appointment with        It was a pleasure meeting with you today.  Thank you for allowing me and my team the privilege of caring for you today.  YOU are the reason we are here, and I truly hope we provided you with the excellent service you deserve.  Please let us know if there is anything else we can do for you so that we can be sure you are leaving completely satisfied with your care experience.

## 2019-08-20 NOTE — PROGRESS NOTES
Service Date: 08/19/2019      REASON FOR VISIT TODAY:  Prostate cancer.      HISTORY OF PRESENT ILLNESS:  Mr. Limon is a 64-year-old Armenian-speaking gentleman who comes to see us for recent diagnosis of prostate cancer.  The patient had a biopsy on 07/26/2019 that showed [place] 3+4 equals 7, as well as multiple cores of Rebecca 3+3 equals 6 throughout his prostate.  The patient did have an MRI that did show some concern for possible extracapsular extension on the left side.  The patient has been counseled on various treatment options and recently saw Dr. Cerda in Radiation Oncology.  He comes in today to discuss options for surgery as well.  The patient notes that he has had some odor in his urine after the biopsy and notes he is not currently drinking much fluid.  He denies any burning with urination.      Also of note, the patient does have a history of liver transplant.  The patient's transplant was on 12/22/2018.      PHYSICAL EXAMINATION:   VITAL:  On exam his blood pressure is 119/69, pulse 51.  He is in no acute distress.   ABDOMEN:  Soft, nontender.  He has a big chevron incision from his liver transplant.  The patient also has a lower midline incision from what sounds like a possible exploratory laparotomy for an end-appendectomy, and there is also some question of a history of an abdominal infection at some point.      DATA:  The patient's pathology report from 07/26/2019 shows Rebecca 3+4 equals 7 in 60% of 1/1 core from the left lateral apex and again Rebecca 3+3 equals 6 in numerous cores including 1 core of 80% involved from the left apex.  The patient's prostate volume was measured at 45 cc.      ASSESSMENT AND PLAN:  Over half of today's hour-long visit, which was conducted through an , was spent counseling the patient regarding his new diagnosis of prostate cancer.  I attempted to explain to Mr. Limon through the  the risks of surgery, which include urinary incontinence and  erectile dysfunction.  We did have a hard time connecting through the  and a hard time coming to common understandings of the issue surrounding surgery as well as radiation, which we also discussed again.  I attempted to outline my concerns to the patient including the fact that given his previous abdominal incisions that a robotic approach may or may not be possible given a likelihood of significant adhesions from the operation that was performed through his lower midline incision.  We further discussed and clarified some of his conceptions regarding radiation and the fact that there is no difference in outcomes between radiation and surgery for at least 10 years in terms of prostate cancer-specific death.  I did suggest to the patient Dr. Rodney Clarke's Guide to Surviving Prostate Cancer, which is an excellent resource for all patients with prostate cancer, and that we would try to find one for him in [place].  However, he notes he does not like to read too much and declined the offer for the book.  At this point, I counseled the patient that I still thought that there was a lot of information to attempt to convey to the patient regarding risks of surgery and radiation.  I did  him with regard to surgery that at a year from surgery, 90% of men would be dry, not needing to wear any pads on a daily basis.  The patient again did not seem to completely grasp this, however.  Therefore, I suggested to the patient that again further counseling in his primary language may be of some benefit.  We will discuss with Dr. Torres again to see if she might be able to explain some of these issues to the patient more effectively in [place].  A urine was sent for UA today, given some of the patient's concerns about a possible infection.  The UA was significant for 21 red blood cells but only 1 white blood cell per high-power field, nitrite negative.  We will be happy to see Mr. Limon back in the near future  if he wishes to pursue surgery after further discussions, hopefully, with Dr. Torres in Radiation Oncology again to clarify the points of interest.         TEE DAWN MD             D: 2019   T: 2019   MT: DARWIN      Name:     DONNA BIGGS   MRN:      [id number]        Account:      [id number]   :      1955           Service Date: 2019      Document: [id number]

## 2019-08-21 ENCOUNTER — PRE VISIT (OUTPATIENT)
Dept: UROLOGY | Facility: CLINIC | Age: 64
End: 2019-08-21

## 2019-08-21 NOTE — TELEPHONE ENCOUNTER
Chief Complaint : Return-Post Dr. Cerda and Dr. Casiano    Hx/Sx: Prostate Cancer/Discuss Surgery Opt.    Records/Orders: Available     Pt Contacted: n/a    At Rooming: Normal

## 2019-08-26 ENCOUNTER — OFFICE VISIT (OUTPATIENT)
Dept: DERMATOLOGY | Facility: CLINIC | Age: 64
End: 2019-08-26
Payer: COMMERCIAL

## 2019-08-26 VITALS — HEART RATE: 54 BPM | DIASTOLIC BLOOD PRESSURE: 62 MMHG | SYSTOLIC BLOOD PRESSURE: 105 MMHG

## 2019-08-26 DIAGNOSIS — L74.519 FOCAL HYPERHIDROSIS: Primary | ICD-10-CM

## 2019-08-26 NOTE — LETTER
"8/26/2019       RE: Loy Limon  [address]  [facility] [id number]     Dear Colleague,    Thank you for referring your patient, Loy Limon, to the [facility] DERMATOLOGY at [facility]. Please see a copy of my visit note below.    [facility] Dermatology Note    Dermatology Problem List:  1. Liver transplant (12/22/18)  - Current Tx: tacrolimus  2. Skin sensitivity/peripheral neuropathy post transplant in setting of new diagnosis of diabetes (A1c 8.6%; up from 5.8% pre-transplant)  3. Hyperhidrosis of the soles of feet  - Current Tx: Aluminum chloride 20% solution     Encounter Date: Aug 26, 2019    CC:   3 month follow up for focal hyperhidrosis    History of Present Illness:  Mr. Loy Limon is a 64 year old [place]an-speaking male with past dermatologic history listed above who presents as a follow-up for focal hyperhidrosis mainly affecting soles. The patient was last seen 5/22/19 when he was started on aluminum chloride solution. A  was used during this interview and exam. Reports that he didn't receive the solution, but instead received the powder. Would apply the powder to his feet, but the powder would \"become gummy\". Had no effect on his hyperhidrosis. Denies any infectious symptoms or axillary or palmar involvement. Notes increased sweating after meals for many years, involving the face, armpits, and trunk in addition to plantar feet.    Of note, underwent liver transplant on 12/22/18. Concerned about medications due to possible liver effects    Past Medical History:   Patient Active Problem List   Diagnosis     Cirrhosis of liver (H)     Liver lesion     HCC (hepatocellular carcinoma) (H)     Liver transplant recipient (H)     Immunosuppressed status (H)     Hypervolemia     Atrial fibrillation with rapid ventricular response (H)     Hematuria     Bilateral wheezing     Hypoxia     Hyperglycemia     " Pre-diabetes     Essential hypertension     Constipation     Biliary stricture of transplanted liver (H)     Drug-induced diabetes mellitus (H)     Muscle tension dysphonia     Laennec's cirrhosis (H)     Past Medical History:   Diagnosis Date     Biliary stricture of transplanted liver (H) 2018     Cancer (H)     hepatocellualr carcinoma     Cirrhosis of liver (H) 2018     Enlarged prostate      Inguinal hernia     Repaired with mesh on 18     Liver lesion 2018     Liver transplanted (H) 2018     donor liver transplant     Portal vein thrombosis     on path explant     Postoperative atrial fibrillation (H) 2018     Pre-diabetes 2018     Past Surgical History:   Procedure Laterality Date     APPENDECTOMY       COLONOSCOPY           ENDOSCOPIC RETROGRADE CHOLANGIOPANCREATOGRAM N/A 2018    Procedure: ENDOSCOPIC RETROGRADE CHOLANGIOPANCREATOGRAM, with Billary Sphincterotomy and Billary Stent Placement;  Surgeon: Omero Lawler MD;  Location: [facility] OR     ENDOSCOPIC RETROGRADE CHOLANGIOPANCREATOGRAM  2019    Procedure: COMBINED ENDOSCOPIC RETROGRADE CHOLANGIOPANCREATOGRAPHY, Bile Duct Stent Exchange;  Surgeon: Omero Lawler MD;  Location: [facility] OR     ENDOSCOPIC RETROGRADE CHOLANGIOPANCREATOGRAM N/A 2019    Procedure: ENDOSCOPIC RETROGRADE CHOLANGIOPANCREATOGRAPHY, WITH BILIARY STENT REMOVAL AND STONE EXTRACTION;  Surgeon: Omero Lawler MD;  Location: [facility] OR     HERNIORRHAPHY INGUINAL  2018    Procedure: Herniorrhaphy inguinal;  Surgeon: Emil Echevarria MD;  Location: [facility] OR     IR LIVER BIOPSY PERCUTANEOUS  2018     TRANSPLANT LIVER RECIPIENT  DONOR N/A 2018    Procedure: TRANSPLANT LIVER RECIPIENT  DONOR;  Surgeon: Emil Echevarria MD;  Location: [facility] OR       Social History:  Patient  reports that he quit smoking about 17 months ago. His smoking use included cigarettes and cigars. He started  smoking about 49 years ago. He has a 0.60 pack-year smoking history. He has never used smokeless tobacco. He reports that he does not drink alcohol or use drugs.    Family History:  Family History   Problem Relation Age of Onset     Liver Disease Brother      Skin Cancer No family hx of      Melanoma No family hx of        Medications:  Current Outpatient Medications   Medication Sig Dispense Refill     alfuzosin ER (UROXATRAL) 10 MG 24 hr tablet Take 1 tablet (10 mg) by mouth daily 90 tablet 3     aluminum chloride (DRYSOL) 20 % external solution Apply topically At Bedtime 60 mL 3     amLODIPine (NORVASC) 10 MG tablet Take 1 tablet (10 mg) by mouth daily 30 tablet 11     aspirin (ASA) 325 MG EC tablet Take 1 tablet (325 mg) by mouth daily 30 tablet 11     blood glucose (NO BRAND SPECIFIED) lancets standard Use to test blood sugar 4 times daily or as directed. 200 each 11     blood glucose (ONETOUCH VERIO IQ) test strip Use to test blood sugar 4 times daily or as directed. 200 strip 11     gabapentin (NEURONTIN) 100 MG capsule Take 1 capsule (100 mg) by mouth 3 times daily Start 100 mg at bedtime for 1-2 weeks, then 2x daily for 1-2 weeks, then 3x daily thereafter. 90 capsule 3     glucose 40 % (400 mg/mL) GEL gel Take 15-30 g by mouth every 15 minutes as needed for low blood sugar 30 g 3     metFORMIN (GLUCOPHAGE-XR) 500 MG 24 hr tablet Take 4 tablets (2,000 mg) by mouth daily (with dinner) (Patient taking differently: Take 1,500 mg by mouth daily (with dinner) ) 120 tablet 3     metoprolol tartrate 75 MG TABS Take 75 mg by mouth 2 times daily 60 tablet 11     multivitamin w/minerals (THERA-VIT-M) tablet Take 1 tablet by mouth daily 90 tablet 3     Sharps Container MISC 1 each daily 1 each 2     tacrolimus (GENERIC EQUIVALENT) 1 MG capsule Take 5 capsules (5 mg) by mouth every 12 hours 300 capsule 11     urea (GORMEL) 20 % external cream Apply as a moisturizer to your whole body everyday. 480 g 5     ursodiol  (ACTIGALL) 250 MG tablet Take 250 mg by mouth 2 times daily          No Known Allergies    Review of Systems:  - As per HPI  - Constitutional: Otherwise feeling well today, in usual state of health.  - Skin: As above in HPI. No additional skin concerns.    Physical exam:  Vitals: /62 (BP Location: Right arm, Patient Position: Sitting, Cuff Size: Adult Regular)   Pulse 54   GEN: This is a well developed, well-nourished male in no acute distress, in a pleasant mood.    SKIN: Focused examination of the hands and feet was performed.  - Doll Skin Type IV  - Moderately moist soles without skin maceration, excessive erythema, or tenderness  - No other lesions of concern on areas examined.     Impression/Plan:  1. Focal hyperhidrosis of plantar feet    Discussed the possible etiologies, clinical course, and management options of this benign condition with the patient. Discussed the various treatment options of local aluminum chloride solution, iontophoresis, and oral medication. After further discusion, patient would like to try local aluminum chloride. If ineffective, would like to attempt iontophoresis due to concern about liver interactions with oral medications    Prescribed aluminum chloride solution BID    Recommended to ensure that his feet are perfectly dry prior to application of the medication     Follow-up in 3 months, earlier for new or changing lesions.     Staff Involved:  Patient was seen and staffed with attending physician Dr. Sarah Vasques MD  Med/Derm Resident PGY-2  P:[phone]    Staff Addendum:  Staff Physician Comments:   I saw and evaluated the patient with the resident and I edited the assessment and plan as documented in the note. I was present for the examination.    Rolly Smith MD   of Dermatology  Department of Dermatology  [facility]  Medicine

## 2019-08-26 NOTE — PROGRESS NOTES
"[facility] Dermatology Note    Dermatology Problem List:  1. Liver transplant (12/22/18)  - Current Tx: tacrolimus  2. Skin sensitivity/peripheral neuropathy post transplant in setting of new diagnosis of diabetes (A1c 8.6%; up from 5.8% pre-transplant)  3. Hyperhidrosis of the soles of feet  - Current Tx: Aluminum chloride 20% solution     Encounter Date: Aug 26, 2019    CC:   3 month follow up for focal hyperhidrosis    History of Present Illness:  Mr. Loy Limno is a 64 year old Stateless-speaking male with past dermatologic history listed above who presents as a follow-up for focal hyperhidrosis mainly affecting soles. The patient was last seen 5/22/19 when he was started on aluminum chloride solution. A  was used during this interview and exam. Reports that he didn't receive the solution, but instead received the powder. Would apply the powder to his feet, but the powder would \"become gummy\". Had no effect on his hyperhidrosis. Denies any infectious symptoms or axillary or palmar involvement. Notes increased sweating after meals for many years, involving the face, armpits, and trunk in addition to plantar feet.    Of note, underwent liver transplant on 12/22/18. Concerned about medications due to possible liver effects    Past Medical History:   Patient Active Problem List   Diagnosis     Cirrhosis of liver (H)     Liver lesion     HCC (hepatocellular carcinoma) (H)     Liver transplant recipient (H)     Immunosuppressed status (H)     Hypervolemia     Atrial fibrillation with rapid ventricular response (H)     Hematuria     Bilateral wheezing     Hypoxia     Hyperglycemia     Pre-diabetes     Essential hypertension     Constipation     Biliary stricture of transplanted liver (H)     Drug-induced diabetes mellitus (H)     Muscle tension dysphonia     Laennec's cirrhosis (H)     Past Medical History:   Diagnosis Date     Biliary stricture of transplanted liver (H) " 2018     Cancer (H)     hepatocellualr carcinoma     Cirrhosis of liver (H) 2018     Enlarged prostate      Inguinal hernia     Repaired with mesh on 18     Liver lesion 2018     Liver transplanted (H) 2018     donor liver transplant     Portal vein thrombosis     on path explant     Postoperative atrial fibrillation (H) 2018     Pre-diabetes 2018     Past Surgical History:   Procedure Laterality Date     APPENDECTOMY       COLONOSCOPY           ENDOSCOPIC RETROGRADE CHOLANGIOPANCREATOGRAM N/A 2018    Procedure: ENDOSCOPIC RETROGRADE CHOLANGIOPANCREATOGRAM, with Billary Sphincterotomy and Billary Stent Placement;  Surgeon: Omero Lawler MD;  Location: [facility] OR     ENDOSCOPIC RETROGRADE CHOLANGIOPANCREATOGRAM  2019    Procedure: COMBINED ENDOSCOPIC RETROGRADE CHOLANGIOPANCREATOGRAPHY, Bile Duct Stent Exchange;  Surgeon: Omero Lawler MD;  Location: [facility] OR     ENDOSCOPIC RETROGRADE CHOLANGIOPANCREATOGRAM N/A 2019    Procedure: ENDOSCOPIC RETROGRADE CHOLANGIOPANCREATOGRAPHY, WITH BILIARY STENT REMOVAL AND STONE EXTRACTION;  Surgeon: Omero Lawler MD;  Location: [facility] OR     HERNIORRHAPHY INGUINAL  2018    Procedure: Herniorrhaphy inguinal;  Surgeon: Emil Echevarria MD;  Location: [facility] OR     IR LIVER BIOPSY PERCUTANEOUS  2018     TRANSPLANT LIVER RECIPIENT  DONOR N/A 2018    Procedure: TRANSPLANT LIVER RECIPIENT  DONOR;  Surgeon: Emil Echevarria MD;  Location: [facility] OR       Social History:  Patient  reports that he quit smoking about 17 months ago. His smoking use included cigarettes and cigars. He started smoking about 49 years ago. He has a 0.60 pack-year smoking history. He has never used smokeless tobacco. He reports that he does not drink alcohol or use drugs.    Family History:  Family History   Problem Relation Age of Onset     Liver Disease Brother      Skin Cancer No family hx of       Melanoma No family hx of        Medications:  Current Outpatient Medications   Medication Sig Dispense Refill     alfuzosin ER (UROXATRAL) 10 MG 24 hr tablet Take 1 tablet (10 mg) by mouth daily 90 tablet 3     aluminum chloride (DRYSOL) 20 % external solution Apply topically At Bedtime 60 mL 3     amLODIPine (NORVASC) 10 MG tablet Take 1 tablet (10 mg) by mouth daily 30 tablet 11     aspirin (ASA) 325 MG EC tablet Take 1 tablet (325 mg) by mouth daily 30 tablet 11     blood glucose (NO BRAND SPECIFIED) lancets standard Use to test blood sugar 4 times daily or as directed. 200 each 11     blood glucose (ONETOUCH VERIO IQ) test strip Use to test blood sugar 4 times daily or as directed. 200 strip 11     gabapentin (NEURONTIN) 100 MG capsule Take 1 capsule (100 mg) by mouth 3 times daily Start 100 mg at bedtime for 1-2 weeks, then 2x daily for 1-2 weeks, then 3x daily thereafter. 90 capsule 3     glucose 40 % (400 mg/mL) GEL gel Take 15-30 g by mouth every 15 minutes as needed for low blood sugar 30 g 3     metFORMIN (GLUCOPHAGE-XR) 500 MG 24 hr tablet Take 4 tablets (2,000 mg) by mouth daily (with dinner) (Patient taking differently: Take 1,500 mg by mouth daily (with dinner) ) 120 tablet 3     metoprolol tartrate 75 MG TABS Take 75 mg by mouth 2 times daily 60 tablet 11     multivitamin w/minerals (THERA-VIT-M) tablet Take 1 tablet by mouth daily 90 tablet 3     Sharps Container MISC 1 each daily 1 each 2     tacrolimus (GENERIC EQUIVALENT) 1 MG capsule Take 5 capsules (5 mg) by mouth every 12 hours 300 capsule 11     urea (GORMEL) 20 % external cream Apply as a moisturizer to your whole body everyday. 480 g 5     ursodiol (ACTIGALL) 250 MG tablet Take 250 mg by mouth 2 times daily          No Known Allergies    Review of Systems:  - As per HPI  - Constitutional: Otherwise feeling well today, in usual state of health.  - Skin: As above in HPI. No additional skin concerns.    Physical exam:  Vitals: /62 (BP  Location: Right arm, Patient Position: Sitting, Cuff Size: Adult Regular)   Pulse 54   GEN: This is a well developed, well-nourished male in no acute distress, in a pleasant mood.    SKIN: Focused examination of the hands and feet was performed.  - Doll Skin Type IV  - Moderately moist soles without skin maceration, excessive erythema, or tenderness  - No other lesions of concern on areas examined.     Impression/Plan:  1. Focal hyperhidrosis of plantar feet    Discussed the possible etiologies, clinical course, and management options of this benign condition with the patient. Discussed the various treatment options of local aluminum chloride solution, iontophoresis, and oral medication. After further discusion, patient would like to try local aluminum chloride. If ineffective, would like to attempt iontophoresis due to concern about liver interactions with oral medications    Prescribed aluminum chloride solution BID    Recommended to ensure that his feet are perfectly dry prior to application of the medication     Follow-up in 3 months, earlier for new or changing lesions.     Staff Involved:  Patient was seen and staffed with attending physician Dr. Sarah Vasques MD  Med/Derm Resident PGY-2  P:[phone]    Staff Addendum:  Staff Physician Comments:   I saw and evaluated the patient with the resident and I edited the assessment and plan as documented in the note. I was present for the examination.    Rolly Smith MD   of Dermatology  Department of Dermatology  [facility]

## 2019-08-26 NOTE — NURSING NOTE
Dermatology Rooming Note    Loy Limon's goals for this visit include:   Chief Complaint   Patient presents with     Derm Problem     Loy is here today for a follow up- Loy notes his feet still feeing hot.      NIRMAL Dover

## 2019-08-30 PROBLEM — K40.30 INGUINAL HERNIA OF RIGHT SIDE WITH OBSTRUCTION AND WITHOUT GANGRENE: Status: ACTIVE | Noted: 2018-03-14

## 2019-08-30 PROBLEM — R10.9 RIGHT SIDED ABDOMINAL PAIN: Status: ACTIVE | Noted: 2018-03-09

## 2019-08-30 PROBLEM — K76.6 PORTAL HYPERTENSION (H): Status: ACTIVE | Noted: 2018-03-14

## 2019-08-30 PROBLEM — R79.89 ELEVATED FERRITIN: Status: ACTIVE | Noted: 2018-03-14

## 2019-09-03 DIAGNOSIS — R35.1 NOCTURIA: ICD-10-CM

## 2019-09-03 NOTE — PROGRESS NOTES
[facility]  Primary Care Center   Jaswinder Anne MD  09/04/2019      Chief Complaint:   RECHECK       History of Present Illness:   Loy Limon is a 64 year old male with a history of hypertension, hepatocellular carcinoma s/p liver transplant, and portal vein thrombosis who presents with a  for diabetes check and follow up on prostate cancer diagnosis.    Diabetes: The patient was recently diagnosed with steroid/immunosuppresant induced diabetes following his liver transplant in December 2018. The patient is fasting and has not taken his medication yet today in preparation for bloodwork. He is up to date on eye exams, he saw optometry in February 2019. He follows with the endocrinology clinic for diabetes management.     Stomach: His belly was hard and numb after his liver transplant, but is now becoming looser. He has had some sensation return.    : He had a prostate biopsy 2 weeks ago, which resulted in a diagnosis of prostate cancer per his urologist Dr. Casiano. He reports that his urologist recommends surgery to treat his prostate cancer. Since the biopsy, his urine has had a foul odor and has been foamy. He denies any pain. .       General health: He has a normal appetite. He denies nausea or vomiting. He reports occasional pain on his left side. He has had thin, loose bowel movements for the last 2 weeks. He had an unremarkable colonoscopy last year. He is traveling to [place] soon for 3 months, but needs to check with his hepatologist before then to make sure it is ok.     Review of Systems:   Pertinent items are noted in HPI, remainder of complete ROS is negative.      Active Medications:     Current Outpatient Medications:      alfuzosin ER (UROXATRAL) 10 MG 24 hr tablet, Take 1 tablet (10 mg) by mouth daily, Disp: 90 tablet, Rfl: 3     aluminum chloride (DRYSOL) 20 % external solution, Apply topically At Bedtime, Disp: 60 mL, Rfl: 3     amLODIPine (NORVASC) 10 MG tablet, Take 1  tablet (10 mg) by mouth daily, Disp: 30 tablet, Rfl: 11     aspirin (ASA) 325 MG EC tablet, Take 1 tablet (325 mg) by mouth daily, Disp: 30 tablet, Rfl: 11     blood glucose (NO BRAND SPECIFIED) lancets standard, Use to test blood sugar 4 times daily or as directed., Disp: 200 each, Rfl: 11     blood glucose (ONETOUCH VERIO IQ) test strip, Use to test blood sugar 4 times daily or as directed., Disp: 200 strip, Rfl: 11     gabapentin (NEURONTIN) 100 MG capsule, Take 1 capsule (100 mg) by mouth 3 times daily Start 100 mg at bedtime for 1-2 weeks, then 2x daily for 1-2 weeks, then 3x daily thereafter., Disp: 90 capsule, Rfl: 3     glucose 40 % (400 mg/mL) GEL gel, Take 15-30 g by mouth every 15 minutes as needed for low blood sugar, Disp: 30 g, Rfl: 3     metFORMIN (GLUCOPHAGE-XR) 500 MG 24 hr tablet, Take 4 tablets (2,000 mg) by mouth daily (with dinner) (Patient taking differently: Take 1,500 mg by mouth daily (with dinner) ), Disp: 120 tablet, Rfl: 3     metoprolol tartrate 75 MG TABS, Take 75 mg by mouth 2 times daily, Disp: 60 tablet, Rfl: 11     multivitamin w/minerals (THERA-VIT-M) tablet, Take 1 tablet by mouth daily, Disp: 90 tablet, Rfl: 3     Sharps Container MISC, 1 each daily, Disp: 1 each, Rfl: 2     tacrolimus (GENERIC EQUIVALENT) 1 MG capsule, Take 5 capsules (5 mg) by mouth every 12 hours, Disp: 300 capsule, Rfl: 11     urea (GORMEL) 20 % external cream, Apply as a moisturizer to your whole body everyday., Disp: 480 g, Rfl: 5     ursodiol (ACTIGALL) 250 MG tablet, Take 250 mg by mouth 2 times daily, Disp: , Rfl:       Allergies:   Patient has no known allergies.      Past Medical History:  Biliary stricture of transplanted liver  Hepatocellular carcinoma  Enlarged prostate  Cirrhosis of liver  Portal vein thrombosis  Post-op atrial fibrillation  Prediabetes  Hyperglycemia  Laennec's cirrhosis  Elevated ferritin  Hypertension  Portal hypertension     Past Surgical  History:  Appendectomy  Colonoscopy  Endoscopic retrograde cholangiopancreatography x3  Herniorrhaphy   Liver biopsy  Transplant liver    Family History:   Brother: liver disease     Social History:     Former smoker    Physical Exam:   /78 (BP Location: Right arm, Patient Position: Sitting, Cuff Size: Adult Regular)   Pulse (!) 45   Wt 83.5 kg (184 lb)   SpO2 98%   BMI 28.82 kg/m     Constitutional: Alert. In no distress.  Head: The scalp, face, and head appear normal.  Musculoskeletal: Extremities appear normal. No gross deformities noted.   Neurologic: Gait normal. Speech is normal and fluent.  Psychiatric: Mentation appears normal. Normal affect.   Gastrointestinal: Abdomen soft. Non-tender. No masses or organomegaly.    Assessment and Plan:  DM type 2, goal HbA1c 7%-8% (H)  We will discuss at a future visit if he should be on an ACE inhibitor or statin.  - Hemoglobin A1c  - Comprehensive metabolic panel  - TSH with free T4 reflex  - Albumin Random Urine Quantitative with Creat Ratio  - Lipid panel reflex to direct LDL Fasting    Foamy urine  Per patient, urine looks foamy since prostate biopsy and he is concerned. I told him it was likely nothing to worry about, but he would like a urine check.  - UA with Micro reflex to Culture    Normal colonoscopy last year reviewed. He reports recent slender stools, I encouraged him to eat more fiber. He is following in urology for prostate cancer. They are deciding if he will have radiation or surgery.      Follow-up: No follow-ups on file.      Scribe Disclosure:   We, Noelle Meza and Prabhu Estrada, are serving as scribes to document services personally performed by Jaswinder Anne MD at this visit, based upon the provider's statements to us. All documentation has been reviewed by the aforementioned provider prior to being entered into the official medical record.     Portions of this medical record were completed by a scribe. UPON MY REVIEW AND  AUTHENTICATION BY ELECTRONIC SIGNATURE, this confirms (a) I performed the applicable clinical services, and (b) the record is accurate.   Jaswinder Anne MD MD

## 2019-09-04 ENCOUNTER — OFFICE VISIT (OUTPATIENT)
Dept: FAMILY MEDICINE | Facility: CLINIC | Age: 64
End: 2019-09-04
Payer: COMMERCIAL

## 2019-09-04 VITALS
WEIGHT: 184 LBS | OXYGEN SATURATION: 98 % | HEART RATE: 45 BPM | SYSTOLIC BLOOD PRESSURE: 134 MMHG | BODY MASS INDEX: 28.82 KG/M2 | DIASTOLIC BLOOD PRESSURE: 78 MMHG

## 2019-09-04 DIAGNOSIS — Z94.4 LIVER REPLACED BY TRANSPLANT (H): ICD-10-CM

## 2019-09-04 DIAGNOSIS — R73.9 STEROID-INDUCED HYPERGLYCEMIA: ICD-10-CM

## 2019-09-04 DIAGNOSIS — Z94.4 LIVER TRANSPLANTED (H): ICD-10-CM

## 2019-09-04 DIAGNOSIS — E11.9 DM TYPE 2, GOAL HBA1C 7%-8% (H): Primary | ICD-10-CM

## 2019-09-04 DIAGNOSIS — E55.9 VITAMIN D DEFICIENCY: ICD-10-CM

## 2019-09-04 DIAGNOSIS — T38.0X5A STEROID-INDUCED HYPERGLYCEMIA: ICD-10-CM

## 2019-09-04 DIAGNOSIS — R82.998 FOAMY URINE: ICD-10-CM

## 2019-09-04 DIAGNOSIS — E11.65 UNCONTROLLED TYPE 2 DIABETES MELLITUS WITH HYPERGLYCEMIA (H): ICD-10-CM

## 2019-09-04 DIAGNOSIS — E11.9 DM TYPE 2, GOAL HBA1C 7%-8% (H): ICD-10-CM

## 2019-09-04 DIAGNOSIS — E56.9 VITAMIN DEFICIENCY: ICD-10-CM

## 2019-09-04 LAB
ALBUMIN SERPL-MCNC: 3.9 G/DL (ref 3.4–5)
ALBUMIN UR-MCNC: 30 MG/DL
ALP SERPL-CCNC: 183 U/L (ref 40–150)
ALT SERPL W P-5'-P-CCNC: 18 U/L (ref 0–70)
ANION GAP SERPL CALCULATED.3IONS-SCNC: 5 MMOL/L (ref 3–14)
APPEARANCE UR: CLEAR
AST SERPL W P-5'-P-CCNC: 8 U/L (ref 0–45)
BILIRUB DIRECT SERPL-MCNC: 0.3 MG/DL (ref 0–0.2)
BILIRUB SERPL-MCNC: 1 MG/DL (ref 0.2–1.3)
BILIRUB UR QL STRIP: NEGATIVE
BUN SERPL-MCNC: 22 MG/DL (ref 7–30)
CALCIUM SERPL-MCNC: 8.6 MG/DL (ref 8.5–10.1)
CHLORIDE SERPL-SCNC: 105 MMOL/L (ref 94–109)
CHOLEST SERPL-MCNC: 128 MG/DL
CO2 SERPL-SCNC: 26 MMOL/L (ref 20–32)
COLOR UR AUTO: YELLOW
CREAT SERPL-MCNC: 0.82 MG/DL (ref 0.66–1.25)
CREAT UR-MCNC: 188 MG/DL
DEPRECATED CALCIDIOL+CALCIFEROL SERPL-MC: 20 UG/L (ref 20–75)
ERYTHROCYTE [DISTWIDTH] IN BLOOD BY AUTOMATED COUNT: 12.5 % (ref 10–15)
GFR SERPL CREATININE-BSD FRML MDRD: >90 ML/MIN/{1.73_M2}
GLUCOSE SERPL-MCNC: 148 MG/DL (ref 70–99)
GLUCOSE UR STRIP-MCNC: NEGATIVE MG/DL
HBA1C MFR BLD: 7.3 % (ref 0–5.6)
HCT VFR BLD AUTO: 37.4 % (ref 40–53)
HDLC SERPL-MCNC: 46 MG/DL
HGB BLD-MCNC: 12.4 G/DL (ref 13.3–17.7)
HGB UR QL STRIP: NEGATIVE
HYALINE CASTS #/AREA URNS LPF: 1 /LPF (ref 0–2)
KETONES UR STRIP-MCNC: NEGATIVE MG/DL
LDLC SERPL CALC-MCNC: 66 MG/DL
LEUKOCYTE ESTERASE UR QL STRIP: NEGATIVE
MAGNESIUM SERPL-MCNC: 2.1 MG/DL (ref 1.6–2.3)
MCH RBC QN AUTO: 29 PG (ref 26.5–33)
MCHC RBC AUTO-ENTMCNC: 33.2 G/DL (ref 31.5–36.5)
MCV RBC AUTO: 88 FL (ref 78–100)
MICROALBUMIN UR-MCNC: 167 MG/L
MICROALBUMIN/CREAT UR: 88.83 MG/G CR (ref 0–17)
MIXED CELL CASTS #/AREA URNS LPF: 1 /LPF
MUCOUS THREADS #/AREA URNS LPF: PRESENT /LPF
NITRATE UR QL: NEGATIVE
NONHDLC SERPL-MCNC: 82 MG/DL
PH UR STRIP: 5 PH (ref 5–7)
PHOSPHATE SERPL-MCNC: 3.2 MG/DL (ref 2.5–4.5)
PLATELET # BLD AUTO: 170 10E9/L (ref 150–450)
POTASSIUM SERPL-SCNC: 5 MMOL/L (ref 3.4–5.3)
PROT SERPL-MCNC: 7.2 G/DL (ref 6.8–8.8)
RBC # BLD AUTO: 4.27 10E12/L (ref 4.4–5.9)
RBC #/AREA URNS AUTO: 5 /HPF (ref 0–2)
SODIUM SERPL-SCNC: 136 MMOL/L (ref 133–144)
SOURCE: ABNORMAL
SP GR UR STRIP: 1.02 (ref 1–1.03)
SQUAMOUS #/AREA URNS AUTO: <1 /HPF (ref 0–1)
T4 FREE SERPL-MCNC: 0.9 NG/DL (ref 0.76–1.46)
TRANS CELLS #/AREA URNS HPF: <1 /HPF
TRIGL SERPL-MCNC: 83 MG/DL
TSH SERPL DL<=0.005 MIU/L-ACNC: 8.47 MU/L (ref 0.4–4)
UROBILINOGEN UR STRIP-MCNC: 0 MG/DL (ref 0–2)
WBC # BLD AUTO: 4.6 10E9/L (ref 4–11)
WBC #/AREA URNS AUTO: 1 /HPF (ref 0–5)

## 2019-09-04 ASSESSMENT — PAIN SCALES - GENERAL: PAINLEVEL: MODERATE PAIN (4)

## 2019-09-04 NOTE — NURSING NOTE
Chief Complaint   Patient presents with     RECHECK     follow up for abdominal pain       Jared Barlow CMA, EMT at 10:05 AM on 9/4/2019.

## 2019-09-04 NOTE — PATIENT INSTRUCTIONS
[facility] Medication Refill Request Information:  * Please contact your pharmacy regarding ANY request for medication refills.  ** [facility] Prescription Fax = [phone]  * Please allow 3 business days for routine medication refills.  * Please allow 5 business days for controlled substance medication refills.     [facility] Test Result notification information:  *You will be notified with in 7-10 days of your appointment day regarding the results of your test.  If you are on MyChart you will be notified as soon as the provider has reviewed the results and signed off on them.    [facility]: [phone]

## 2019-09-05 LAB
ETHYL GLUCURONIDE UR QL: NEGATIVE
TACROLIMUS BLD-MCNC: 8.6 UG/L (ref 5–15)
TME LAST DOSE: NORMAL H

## 2019-09-06 ENCOUNTER — OFFICE VISIT (OUTPATIENT)
Dept: UROLOGY | Facility: CLINIC | Age: 64
End: 2019-09-06
Payer: COMMERCIAL

## 2019-09-06 VITALS
DIASTOLIC BLOOD PRESSURE: 69 MMHG | HEART RATE: 52 BPM | SYSTOLIC BLOOD PRESSURE: 129 MMHG | BODY MASS INDEX: 29.07 KG/M2 | WEIGHT: 185.2 LBS | HEIGHT: 67 IN

## 2019-09-06 DIAGNOSIS — C61 PROSTATE CANCER (H): Primary | ICD-10-CM

## 2019-09-06 ASSESSMENT — MIFFLIN-ST. JEOR: SCORE: 1578.69

## 2019-09-06 ASSESSMENT — PAIN SCALES - GENERAL: PAINLEVEL: NO PAIN (0)

## 2019-09-06 NOTE — PATIENT INSTRUCTIONS
Please schedule surgery with Dr. Casiano, we will call you to set this up!    It was a pleasure meeting with you today.  Thank you for allowing me and my team the privilege of caring for you today.  YOU are the reason we are here, and I truly hope we provided you with the excellent service you deserve.  Please let us know if there is anything else we can do for you so that we can be sure you are leaving completely satisfied with your care experience.      Rodney Koenig, EMT

## 2019-09-06 NOTE — LETTER
2019       RE: Loy Limon  [address]  [facility] [address]     Dear Colleague,    Thank you for referring your patient, Loy Limon, to the [facility] AND [facility]ST FOR PROSTATE AND UROLOGIC CANCERS at [facility]. Please see a copy of my visit note below.    UROLOGIC DIAGNOSES:       CURRENT INTERVENTIONS:       HISTORY:       Patient presents for evaluation and management of BPH. Patient found to have enlarged prostate on  work up previously   Patient preparing for liver transplant and presents for clearance.      Nocturia 3x per night   Frequency up to 1.5-2 hours   Good stream of urine     Patient states that he is urinating q four hours at present. Nocturia is now 2x per night after starting afluzosin.     Patient continues to be pleased with his symptoms at present after starting alpha blocker.   Patient currently s/p liver transplant and doing very well.     Noted to have an elevated PSA on screening. Had repeat PSA that was similarly elevated.   MRI prostate showed a PIRADS 5 lesion.     Prostate biopsy revealed [place] 3+4 prostate cancer.   Has seen Dr. Cerda and Dr. Casiano to discuss treatment options.     We discussed treatment options for prostate cancer including EBRT and robot-assisted laparoscopic prostatectomy. We discussed r/b/a of each.         PAST MEDICAL HISTORY:   Past Medical History:   Diagnosis Date     Biliary stricture of transplanted liver (H) 2018     Cancer (H)     hepatocellualr carcinoma     Cirrhosis of liver (H) 2018     Enlarged prostate      Inguinal hernia     Repaired with mesh on 18     Liver lesion 2018     Liver transplanted (H) 2018     donor liver transplant     Portal vein thrombosis     on path explant     Postoperative atrial fibrillation (H) 2018     Pre-diabetes 2018       PAST SURGICAL HISTORY:   Past Surgical History:   Procedure Laterality Date      APPENDECTOMY       COLONOSCOPY           ENDOSCOPIC RETROGRADE CHOLANGIOPANCREATOGRAM N/A 2018    Procedure: ENDOSCOPIC RETROGRADE CHOLANGIOPANCREATOGRAM, with Billary Sphincterotomy and Billary Stent Placement;  Surgeon: Omero Lawler MD;  Location: [facility] OR     ENDOSCOPIC RETROGRADE CHOLANGIOPANCREATOGRAM  2019    Procedure: COMBINED ENDOSCOPIC RETROGRADE CHOLANGIOPANCREATOGRAPHY, Bile Duct Stent Exchange;  Surgeon: Omero Lawler MD;  Location: [facility] OR     ENDOSCOPIC RETROGRADE CHOLANGIOPANCREATOGRAM N/A 2019    Procedure: ENDOSCOPIC RETROGRADE CHOLANGIOPANCREATOGRAPHY, WITH BILIARY STENT REMOVAL AND STONE EXTRACTION;  Surgeon: Omero Lawler MD;  Location: [facility] OR     HERNIORRHAPHY INGUINAL  2018    Procedure: Herniorrhaphy inguinal;  Surgeon: Emil Echevarria MD;  Location: [facility] OR     IR LIVER BIOPSY PERCUTANEOUS  2018     TRANSPLANT LIVER RECIPIENT  DONOR N/A 2018    Procedure: TRANSPLANT LIVER RECIPIENT  DONOR;  Surgeon: Emil Echevarria MD;  Location: [facility] OR       FAMILY HISTORY:   Family History   Problem Relation Age of Onset     Liver Disease Brother      Skin Cancer No family hx of      Melanoma No family hx of        SOCIAL HISTORY:   Social History     Tobacco Use     Smoking status: Former Smoker     Packs/day: 0.03     Years: 20.00     Pack years: 0.60     Types: Cigarettes, Cigars     Start date: 1970     Last attempt to quit: 3/14/2018     Years since quittin.5     Smokeless tobacco: Never Used     Tobacco comment: Quit smoking 2018, one cigarette after dinner   Substance Use Topics     Alcohol use: No     Comment: Quit drinking 2018       Current Outpatient Medications   Medication     alfuzosin ER (UROXATRAL) 10 MG 24 hr tablet     aluminum chloride (DRYSOL) 20 % external solution     amLODIPine (NORVASC) 10 MG tablet     aspirin (ASA) 325 MG EC tablet     blood glucose (NO BRAND SPECIFIED) lancets  "standard     blood glucose (ONETOUCH VERIO IQ) test strip     gabapentin (NEURONTIN) 100 MG capsule     glucose 40 % (400 mg/mL) GEL gel     metFORMIN (GLUCOPHAGE-XR) 500 MG 24 hr tablet     metoprolol tartrate 75 MG TABS     multivitamin w/minerals (THERA-VIT-M) tablet     Sharps Container MISC     tacrolimus (GENERIC EQUIVALENT) 1 MG capsule     urea (GORMEL) 20 % external cream     ursodiol (ACTIGALL) 250 MG tablet     No current facility-administered medications for this visit.          PHYSICAL EXAM:    /69   Pulse 52   Ht 1.702 m (5' 7\")   Wt 84 kg (185 lb 3.2 oz)   BMI 29.01 kg/m       HEENT: Normocephalic and atraumatic   Cardiac: Not done  Back/Flank: Not done  CNS/PNS: Not done  Respiratory: Normal non-labored breathing  Abdomen: Soft nontender and nondistended  Peripheral Vascular: Not done  Mental Status: Not done    Penis: Not done  Scrotal Skin: Not done  Testicles: Not done  Epididymis: Not done  Digital Rectal Exam:     Cystoscopy: Not done    Imaging: None    Urinalysis: UA RESULTS:  Recent Labs   Lab Test  07/19/18   1133   COLOR  Yellow   APPEARANCE  Clear   URINEGLC  Negative   URINEBILI  Negative   URINEKETONE  Negative   SG  1.009   UBLD  Negative   URINEPH  8.0*   PROTEIN  Negative   NITRITE  Negative   LEUKEST  Negative       PSA:     Post Void Residual: ml    Other labs: None today      IMPRESSION:  62 y/o M with lower urinary tract symptoms now improved with alpha blocker dxed with [place] 3+4 prostate cancer     PLAN:  Will schedule for robot-assisted laparoscopic prostatectomy possible open prostatectomy with Dr. Casiano         Total Time: 15 minutes                                       Total in Consultation: greater than 50%               Again, thank you for allowing me to participate in the care of your patient.      Sincerely,    Khloe Torres MD      "

## 2019-09-06 NOTE — NURSING NOTE
"Chief Complaint   Patient presents with     RECHECK     prostate cancer follow up        Blood pressure 129/69, pulse 52, height 1.702 m (5' 7\"), weight 84 kg (185 lb 3.2 oz). Body mass index is 29.01 kg/m .    Patient Active Problem List   Diagnosis     Cirrhosis of liver (H)     Liver lesion     HCC (hepatocellular carcinoma) (H)     Liver transplant recipient (H)     Immunosuppressed status (H)     Hypervolemia     Atrial fibrillation with rapid ventricular response (H)     Hematuria     Bilateral wheezing     Hypoxia     Hyperglycemia     Pre-diabetes     Essential hypertension     Constipation     Biliary stricture of transplanted liver (H)     Drug-induced diabetes mellitus (H)     Muscle tension dysphonia     Laennec's cirrhosis (H)     Elevated ferritin     Inguinal hernia of right side with obstruction and without gangrene     Portal hypertension (H)     Right sided abdominal pain     Tobacco use disorder       No Known Allergies    Current Outpatient Medications   Medication Sig Dispense Refill     alfuzosin ER (UROXATRAL) 10 MG 24 hr tablet Take 1 tablet (10 mg) by mouth daily 90 tablet 3     aluminum chloride (DRYSOL) 20 % external solution Apply topically At Bedtime 60 mL 3     amLODIPine (NORVASC) 10 MG tablet Take 1 tablet (10 mg) by mouth daily 30 tablet 11     aspirin (ASA) 325 MG EC tablet Take 1 tablet (325 mg) by mouth daily 30 tablet 11     blood glucose (NO BRAND SPECIFIED) lancets standard Use to test blood sugar 4 times daily or as directed. 200 each 11     blood glucose (ONETOUCH VERIO IQ) test strip Use to test blood sugar 4 times daily or as directed. 200 strip 11     gabapentin (NEURONTIN) 100 MG capsule Take 1 capsule (100 mg) by mouth 3 times daily Start 100 mg at bedtime for 1-2 weeks, then 2x daily for 1-2 weeks, then 3x daily thereafter. 90 capsule 3     glucose 40 % (400 mg/mL) GEL gel Take 15-30 g by mouth every 15 minutes as needed for low blood sugar 30 g 3     metFORMIN " (GLUCOPHAGE-XR) 500 MG 24 hr tablet Take 4 tablets (2,000 mg) by mouth daily (with dinner) (Patient taking differently: Take 1,500 mg by mouth daily (with dinner) ) 120 tablet 3     metoprolol tartrate 75 MG TABS Take 75 mg by mouth 2 times daily 60 tablet 11     multivitamin w/minerals (THERA-VIT-M) tablet Take 1 tablet by mouth daily 90 tablet 3     Sharps Container MISC 1 each daily 1 each 2     tacrolimus (GENERIC EQUIVALENT) 1 MG capsule Take 5 capsules (5 mg) by mouth every 12 hours 300 capsule 11     urea (GORMEL) 20 % external cream Apply as a moisturizer to your whole body everyday. 480 g 5     ursodiol (ACTIGALL) 250 MG tablet Take 250 mg by mouth 2 times daily         Social History     Tobacco Use     Smoking status: Former Smoker     Packs/day: 0.03     Years: 20.00     Pack years: 0.60     Types: Cigarettes, Cigars     Start date: 1970     Last attempt to quit: 3/14/2018     Years since quittin.4     Smokeless tobacco: Never Used     Tobacco comment: Quit smoking 2018, one cigarette after dinner   Substance Use Topics     Alcohol use: No     Comment: Quit drinking 2018     Drug use: No       Pam Elizalde CMA, RMA  2019  9:35 AM

## 2019-09-09 RX ORDER — ALFUZOSIN HYDROCHLORIDE 10 MG/1
TABLET, EXTENDED RELEASE ORAL
Qty: 30 TABLET | Refills: 3 | OUTPATIENT
Start: 2019-09-09

## 2019-09-12 DIAGNOSIS — Z01.818 PRE-OP EXAM: ICD-10-CM

## 2019-09-12 DIAGNOSIS — C61 PROSTATE CANCER (H): Primary | ICD-10-CM

## 2019-09-12 RX ORDER — CEFAZOLIN SODIUM 2 G/50ML
2 SOLUTION INTRAVENOUS
Status: CANCELLED | OUTPATIENT
Start: 2019-09-12

## 2019-09-12 RX ORDER — CEFAZOLIN SODIUM 1 G/50ML
1 INJECTION, SOLUTION INTRAVENOUS SEE ADMIN INSTRUCTIONS
Status: CANCELLED | OUTPATIENT
Start: 2019-09-12

## 2019-09-13 NOTE — PROGRESS NOTES
UROLOGIC DIAGNOSES:       CURRENT INTERVENTIONS:       HISTORY:       Patient presents for evaluation and management of BPH. Patient found to have enlarged prostate on  work up previously   Patient preparing for liver transplant and presents for clearance.      Nocturia 3x per night   Frequency up to 1.5-2 hours   Good stream of urine     Patient states that he is urinating q four hours at present. Nocturia is now 2x per night after starting afluzosin.     Patient continues to be pleased with his symptoms at present after starting alpha blocker.   Patient currently s/p liver transplant and doing very well.     Noted to have an elevated PSA on screening. Had repeat PSA that was similarly elevated.   MRI prostate showed a PIRADS 5 lesion.     Prostate biopsy revealed [place] 3+4 prostate cancer.   Has seen Dr. Cerda and Dr. Casiano to discuss treatment options.     We discussed treatment options for prostate cancer including EBRT and robot-assisted laparoscopic prostatectomy. We discussed r/b/a of each.         PAST MEDICAL HISTORY:   Past Medical History:   Diagnosis Date     Biliary stricture of transplanted liver (H) 2018     Cancer (H)     hepatocellualr carcinoma     Cirrhosis of liver (H) 2018     Enlarged prostate      Inguinal hernia     Repaired with mesh on 18     Liver lesion 2018     Liver transplanted (H) 2018     donor liver transplant     Portal vein thrombosis     on path explant     Postoperative atrial fibrillation (H) 2018     Pre-diabetes 2018       PAST SURGICAL HISTORY:   Past Surgical History:   Procedure Laterality Date     APPENDECTOMY       COLONOSCOPY           ENDOSCOPIC RETROGRADE CHOLANGIOPANCREATOGRAM N/A 2018    Procedure: ENDOSCOPIC RETROGRADE CHOLANGIOPANCREATOGRAM, with Billary Sphincterotomy and Billary Stent Placement;  Surgeon: Omero Lawler MD;  Location: [facility]     ENDOSCOPIC RETROGRADE CHOLANGIOPANCREATOGRAM   2019    Procedure: COMBINED ENDOSCOPIC RETROGRADE CHOLANGIOPANCREATOGRAPHY, Bile Duct Stent Exchange;  Surgeon: Omero Lawler MD;  Location: [facility]     ENDOSCOPIC RETROGRADE CHOLANGIOPANCREATOGRAM N/A 2019    Procedure: ENDOSCOPIC RETROGRADE CHOLANGIOPANCREATOGRAPHY, WITH BILIARY STENT REMOVAL AND STONE EXTRACTION;  Surgeon: Omero Lawler MD;  Location: [facility]     HERNIORRHAPHY INGUINAL  2018    Procedure: Herniorrhaphy inguinal;  Surgeon: Emil Echevarria MD;  Location: [facility]     IR LIVER BIOPSY PERCUTANEOUS  2018     TRANSPLANT LIVER RECIPIENT  DONOR N/A 2018    Procedure: TRANSPLANT LIVER RECIPIENT  DONOR;  Surgeon: Emil Echevarria MD;  Location: [facility] OR       FAMILY HISTORY:   Family History   Problem Relation Age of Onset     Liver Disease Brother      Skin Cancer No family hx of      Melanoma No family hx of        SOCIAL HISTORY:   Social History     Tobacco Use     Smoking status: Former Smoker     Packs/day: 0.03     Years: 20.00     Pack years: 0.60     Types: Cigarettes, Cigars     Start date: 1970     Last attempt to quit: 3/14/2018     Years since quittin.5     Smokeless tobacco: Never Used     Tobacco comment: Quit smoking 2018, one cigarette after dinner   Substance Use Topics     Alcohol use: No     Comment: Quit drinking 2018       Current Outpatient Medications   Medication     alfuzosin ER (UROXATRAL) 10 MG 24 hr tablet     aluminum chloride (DRYSOL) 20 % external solution     amLODIPine (NORVASC) 10 MG tablet     aspirin (ASA) 325 MG EC tablet     blood glucose (NO BRAND SPECIFIED) lancets standard     blood glucose (ONETOUCH VERIO IQ) test strip     gabapentin (NEURONTIN) 100 MG capsule     glucose 40 % (400 mg/mL) GEL gel     metFORMIN (GLUCOPHAGE-XR) 500 MG 24 hr tablet     metoprolol tartrate 75 MG TABS     multivitamin w/minerals (THERA-VIT-M) tablet     Sharps Container MISC     tacrolimus (GENERIC  "EQUIVALENT) 1 MG capsule     urea (GORMEL) 20 % external cream     ursodiol (ACTIGALL) 250 MG tablet     No current facility-administered medications for this visit.          PHYSICAL EXAM:    /69   Pulse 52   Ht 1.702 m (5' 7\")   Wt 84 kg (185 lb 3.2 oz)   BMI 29.01 kg/m      HEENT: Normocephalic and atraumatic   Cardiac: Not done  Back/Flank: Not done  CNS/PNS: Not done  Respiratory: Normal non-labored breathing  Abdomen: Soft nontender and nondistended  Peripheral Vascular: Not done  Mental Status: Not done    Penis: Not done  Scrotal Skin: Not done  Testicles: Not done  Epididymis: Not done  Digital Rectal Exam:     Cystoscopy: Not done    Imaging: None    Urinalysis: UA RESULTS:  Recent Labs   Lab Test  07/19/18   1133   COLOR  Yellow   APPEARANCE  Clear   URINEGLC  Negative   URINEBILI  Negative   URINEKETONE  Negative   SG  1.009   UBLD  Negative   URINEPH  8.0*   PROTEIN  Negative   NITRITE  Negative   LEUKEST  Negative       PSA:     Post Void Residual: ml    Other labs: None today      IMPRESSION:  64 y/o M with lower urinary tract symptoms now improved with alpha blocker dxed with [place] 3+4 prostate cancer     PLAN:  Will schedule for robot-assisted laparoscopic prostatectomy possible open prostatectomy with Dr. Casiano         Total Time: 15 minutes                                       Total in Consultation: greater than 50%             "

## 2019-10-03 NOTE — Clinical Note
OCTAVIO Daniels,   Pt needs MRI, chest CT and bone scan prior to IR and onc consults ASAP.   Pt also put in for consult with Dr. Carballo 5/29/18 (IR priority, okay if we have to move hepatology consult to accommodate). Orders in place.   Thanks a lot,  Noelle
FAMILY HISTORY:  Family history of breast cancer, Mother  Family history of myocardial infarction, Father

## 2019-10-10 ENCOUNTER — TELEPHONE (OUTPATIENT)
Dept: UROLOGY | Facility: CLINIC | Age: 64
End: 2019-10-10

## 2019-10-10 ENCOUNTER — TELEPHONE (OUTPATIENT)
Dept: NURSING | Facility: CLINIC | Age: 64
End: 2019-10-10

## 2019-10-10 ENCOUNTER — TELEPHONE (OUTPATIENT)
Dept: TRANSPLANT | Facility: CLINIC | Age: 64
End: 2019-10-10

## 2019-10-10 ENCOUNTER — DOCUMENTATION ONLY (OUTPATIENT)
Dept: TRANSPLANT | Facility: CLINIC | Age: 64
End: 2019-10-10

## 2019-10-10 DIAGNOSIS — C61 PROSTATE CANCER (H): ICD-10-CM

## 2019-10-10 DIAGNOSIS — N39.0 URINARY TRACT INFECTION: ICD-10-CM

## 2019-10-10 DIAGNOSIS — Z94.4 LIVER REPLACED BY TRANSPLANT (H): ICD-10-CM

## 2019-10-10 DIAGNOSIS — N39.0 URINARY TRACT INFECTION: Primary | ICD-10-CM

## 2019-10-10 LAB
ALBUMIN UR-MCNC: NEGATIVE MG/DL
APPEARANCE UR: CLEAR
BILIRUB UR QL STRIP: NEGATIVE
COLOR UR AUTO: YELLOW
GLUCOSE UR STRIP-MCNC: NEGATIVE MG/DL
HGB UR QL STRIP: NEGATIVE
HYALINE CASTS #/AREA URNS LPF: 1 /LPF (ref 0–2)
KETONES UR STRIP-MCNC: NEGATIVE MG/DL
LEUKOCYTE ESTERASE UR QL STRIP: NEGATIVE
NITRATE UR QL: NEGATIVE
PH UR STRIP: 5 PH (ref 5–7)
RBC #/AREA URNS AUTO: 1 /HPF (ref 0–2)
SOURCE: NORMAL
SP GR UR STRIP: 1.02 (ref 1–1.03)
SQUAMOUS #/AREA URNS AUTO: <1 /HPF (ref 0–1)
UROBILINOGEN UR STRIP-MCNC: 0 MG/DL (ref 0–2)
WBC #/AREA URNS AUTO: <1 /HPF (ref 0–5)

## 2019-10-10 NOTE — TELEPHONE ENCOUNTER
"Call w. The asistance of a  reminding Claribel that he should have lab testing every month.  He reports that he has blood in his urine, that \"the doctor told him this was normal for while after they the procedure\".  He tells me that his urine is \"smelly\" and \"foamy\".  He told me that the doctor told him they cannot help him until they talk w/ Dr. Carballo.  He is waiting to hear back from Dr. Torres office. I have not heard anything from this team. I asked him to call them back asap.  He denies fever.  I will send Dr. Torres a message.  Dora a lab appt    - I sent a message to our  to make a lab appt  On MOnday, Oct 14 at 0700.  "

## 2019-10-10 NOTE — PROGRESS NOTES
Annual chart review completed.      Patient is due for lab testing.  We will call him to remind him.  He is up to josé ew/ appt's.

## 2019-10-10 NOTE — TELEPHONE ENCOUNTER
Patient called in and stated he is having burning with urination and odor in his urine  It hurts so much  He will drop off uauc tonight at our lab and wants antibiotics before week end . Sayra Martinez LPN Staff Nurse

## 2019-10-10 NOTE — TELEPHONE ENCOUNTER
.[facility]: Nurse Triage Note  SITUATION/BACKGROUND                                                      Loy Limon is a 64 year old male who calls to report with  (Dutch) having pain - post void. Urine is foul smelling.   Contacted Urology. Spoke to nurse Sayra. Sayra informed patient  to come in to lab for UA/C. Preferred pharmacy obtained by nurse. Patient/  verbalized an understanding and agreed with plan.

## 2019-10-11 ENCOUNTER — PATIENT OUTREACH (OUTPATIENT)
Dept: UROLOGY | Facility: CLINIC | Age: 64
End: 2019-10-11

## 2019-10-11 LAB
BACTERIA SPEC CULT: NO GROWTH
Lab: NORMAL
SPECIMEN SOURCE: NORMAL

## 2019-10-11 NOTE — PROGRESS NOTES
I called and spoke with patient to let him know that he does not have a urinary tract infection. He is frustrated that his pee is foul smelling.He is going to call and follow up with his PMD.June Koenig RN

## 2019-10-16 ENCOUNTER — OFFICE VISIT (OUTPATIENT)
Dept: ENDOCRINOLOGY | Facility: CLINIC | Age: 64
End: 2019-10-16
Payer: COMMERCIAL

## 2019-10-16 ENCOUNTER — DOCUMENTATION ONLY (OUTPATIENT)
Dept: FAMILY MEDICINE | Facility: CLINIC | Age: 64
End: 2019-10-16

## 2019-10-16 VITALS
DIASTOLIC BLOOD PRESSURE: 77 MMHG | HEIGHT: 67 IN | HEART RATE: 46 BPM | BODY MASS INDEX: 28.94 KG/M2 | SYSTOLIC BLOOD PRESSURE: 143 MMHG | WEIGHT: 184.4 LBS

## 2019-10-16 DIAGNOSIS — E11.65 UNCONTROLLED TYPE 2 DIABETES MELLITUS WITH HYPERGLYCEMIA (H): Primary | ICD-10-CM

## 2019-10-16 DIAGNOSIS — C61 PROSTATE CANCER (H): ICD-10-CM

## 2019-10-16 DIAGNOSIS — Z23 NEED FOR PROPHYLACTIC VACCINATION AND INOCULATION AGAINST INFLUENZA: ICD-10-CM

## 2019-10-16 DIAGNOSIS — E11.65 UNCONTROLLED TYPE 2 DIABETES MELLITUS WITH HYPERGLYCEMIA (H): ICD-10-CM

## 2019-10-16 DIAGNOSIS — Z94.4 LIVER REPLACED BY TRANSPLANT (H): ICD-10-CM

## 2019-10-16 DIAGNOSIS — Z01.818 PRE-OP EXAM: ICD-10-CM

## 2019-10-16 LAB
ABO + RH BLD: NORMAL
ABO + RH BLD: NORMAL
ALBUMIN SERPL-MCNC: 3.6 G/DL (ref 3.4–5)
ALBUMIN UR-MCNC: 30 MG/DL
ALP SERPL-CCNC: 180 U/L (ref 40–150)
ALT SERPL W P-5'-P-CCNC: 23 U/L (ref 0–70)
ANION GAP SERPL CALCULATED.3IONS-SCNC: 6 MMOL/L (ref 3–14)
APPEARANCE UR: ABNORMAL
AST SERPL W P-5'-P-CCNC: 12 U/L (ref 0–45)
BILIRUB DIRECT SERPL-MCNC: 0.2 MG/DL (ref 0–0.2)
BILIRUB SERPL-MCNC: 0.8 MG/DL (ref 0.2–1.3)
BILIRUB UR QL STRIP: NEGATIVE
BLD GP AB SCN SERPL QL: NORMAL
BLOOD BANK CMNT PATIENT-IMP: NORMAL
BUN SERPL-MCNC: 17 MG/DL (ref 7–30)
CALCIUM SERPL-MCNC: 8.6 MG/DL (ref 8.5–10.1)
CHLORIDE SERPL-SCNC: 108 MMOL/L (ref 94–109)
CO2 SERPL-SCNC: 24 MMOL/L (ref 20–32)
COLOR UR AUTO: YELLOW
CREAT SERPL-MCNC: 0.74 MG/DL (ref 0.66–1.25)
CREAT UR-MCNC: 122 MG/DL
ERYTHROCYTE [DISTWIDTH] IN BLOOD BY AUTOMATED COUNT: 12.8 % (ref 10–15)
GFR SERPL CREATININE-BSD FRML MDRD: >90 ML/MIN/{1.73_M2}
GLUCOSE SERPL-MCNC: 156 MG/DL (ref 70–99)
GLUCOSE UR STRIP-MCNC: NEGATIVE MG/DL
HCT VFR BLD AUTO: 35.3 % (ref 40–53)
HGB BLD-MCNC: 11.7 G/DL (ref 13.3–17.7)
HGB UR QL STRIP: NEGATIVE
KETONES UR STRIP-MCNC: NEGATIVE MG/DL
LEUKOCYTE ESTERASE UR QL STRIP: NEGATIVE
MAGNESIUM SERPL-MCNC: 2 MG/DL (ref 1.6–2.3)
MCH RBC QN AUTO: 28.7 PG (ref 26.5–33)
MCHC RBC AUTO-ENTMCNC: 33.1 G/DL (ref 31.5–36.5)
MCV RBC AUTO: 87 FL (ref 78–100)
MICROALBUMIN UR-MCNC: 312 MG/L
MICROALBUMIN/CREAT UR: 255.74 MG/G CR (ref 0–17)
MUCOUS THREADS #/AREA URNS LPF: PRESENT /LPF
NITRATE UR QL: NEGATIVE
PH UR STRIP: 5 PH (ref 5–7)
PHOSPHATE SERPL-MCNC: 3.4 MG/DL (ref 2.5–4.5)
PLATELET # BLD AUTO: 144 10E9/L (ref 150–450)
POTASSIUM SERPL-SCNC: 5 MMOL/L (ref 3.4–5.3)
PROT SERPL-MCNC: 7 G/DL (ref 6.8–8.8)
RBC # BLD AUTO: 4.07 10E12/L (ref 4.4–5.9)
RBC #/AREA URNS AUTO: 2 /HPF (ref 0–2)
SODIUM SERPL-SCNC: 138 MMOL/L (ref 133–144)
SOURCE: ABNORMAL
SP GR UR STRIP: 1.01 (ref 1–1.03)
SPECIMEN EXP DATE BLD: NORMAL
T4 FREE SERPL-MCNC: 0.91 NG/DL (ref 0.76–1.46)
TSH SERPL DL<=0.005 MIU/L-ACNC: 7.66 MU/L (ref 0.4–4)
UROBILINOGEN UR STRIP-MCNC: 0 MG/DL (ref 0–2)
WBC # BLD AUTO: 3.7 10E9/L (ref 4–11)
WBC #/AREA URNS AUTO: 1 /HPF (ref 0–5)

## 2019-10-16 ASSESSMENT — PAIN SCALES - GENERAL: PAINLEVEL: NO PAIN (0)

## 2019-10-16 ASSESSMENT — MIFFLIN-ST. JEOR: SCORE: 1575.06

## 2019-10-16 NOTE — PATIENT INSTRUCTIONS
Continue current dose of Metformin daily.  Check your blood sugar fasting each am and before dinner daily.  Please meet with our dietitian and diabetic educator.  Have labs done.  Flu vaccine given today.  See me in 3 months.  Gilma Guthrie PA-C

## 2019-10-16 NOTE — LETTER
10/16/2019       RE: Loy Limon  [address]  [facility] [id number]     Dear Colleague,    Thank you for referring your patient, Loy Limon, to the [facility] ENDOCRINOLOGY at [facility]. Please see a copy of my visit note below.    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  He was last seen in our clinic by Roma Lira PA-C in 7/2019.  Pt's has a hx of Laennec's cirrhosis with HCC, portal HTN, latent TB who underwent living donor transplant on 12/22/218.  He developed steroid/immunosuppressant induced diabetes and was treated with NPH insulin which was discontinued once he was no longer taking steroids.  Later follow up showed an A1C value of 8.5 % in May 2019.  He was started on low dose Metformin which was increased last visit to Metformin 500 mg 2 tabs po each am and 2 tabs po each pm.  He denies missing any Metformin doses.  He has been eating more lately.  His A1C was 7.3 % on 9/4/2019.  He has no glucose meter with him today and tells me he has not been checking his blood sugar at home.  On ROS today, he states he was recently dx having prostate cancer and will be having surgery sometime in Nov 2019.  Some intermittent headaches.  He has chronic mild abdominal discomfort since his liver transplant.  No fevers or chills.  His toes are numb.  He reports generalized muscle aches.  Pt denies blurred vision, n/v, SOB at rest or cough.  No chest pain, diarrhea, dysuria or hematuria. No blood in the stool or melena.  No dysuria or hematuria.  No foot ulcers.    Diabetes Care  Retinopathy: none; pt seen by Oph here in 2/2019.  Nephropathy: yes; urine microalbuminuria + today. Will discuss adding an ace or ARB.  Neuropathy: yes.  Foot Exam: no ulcers.  Taking aspirin: yes.  Lipids:LDL 66 in 9/2019.     ROS  Please see under HPI.    Allergies  No Known Allergies    Medications  Current  Outpatient Medications   Medication Sig Dispense Refill     alfuzosin ER (UROXATRAL) 10 MG 24 hr tablet Take 1 tablet (10 mg) by mouth daily 90 tablet 3     aluminum chloride (DRYSOL) 20 % external solution Apply topically At Bedtime 60 mL 3     amLODIPine (NORVASC) 10 MG tablet Take 1 tablet (10 mg) by mouth daily 30 tablet 11     aspirin (ASA) 325 MG EC tablet Take 1 tablet (325 mg) by mouth daily 30 tablet 11     blood glucose (NO BRAND SPECIFIED) lancets standard Use to test blood sugar 4 times daily or as directed. 200 each 11     blood glucose (ONETOUCH VERIO IQ) test strip Use to test blood sugar 4 times daily or as directed. 200 strip 11     gabapentin (NEURONTIN) 100 MG capsule Take 1 capsule (100 mg) by mouth 3 times daily Start 100 mg at bedtime for 1-2 weeks, then 2x daily for 1-2 weeks, then 3x daily thereafter. 90 capsule 3     glucose 40 % (400 mg/mL) GEL gel Take 15-30 g by mouth every 15 minutes as needed for low blood sugar 30 g 3     metFORMIN (GLUCOPHAGE-XR) 500 MG 24 hr tablet Take 4 tablets (2,000 mg) by mouth daily (with dinner) (Patient taking differently: Take 1,500 mg by mouth daily (with dinner) ) 120 tablet 3     metoprolol tartrate 75 MG TABS Take 75 mg by mouth 2 times daily 60 tablet 11     multivitamin w/minerals (THERA-VIT-M) tablet Take 1 tablet by mouth daily 90 tablet 3     Sharps Container MISC 1 each daily 1 each 2     tacrolimus (GENERIC EQUIVALENT) 1 MG capsule Take 5 capsules (5 mg) by mouth every 12 hours 300 capsule 11     urea (GORMEL) 20 % external cream Apply as a moisturizer to your whole body everyday. 480 g 5     ursodiol (ACTIGALL) 250 MG tablet Take 250 mg by mouth 2 times daily         Family History  family history includes Liver Disease in his brother.    Social History   reports that he quit smoking about 19 months ago. His smoking use included cigarettes and cigars. He started smoking about 49 years ago. He has a 0.60 pack-year smoking history. He has never  "used smokeless tobacco. He reports that he does not drink alcohol or use drugs.     Past Medical History  Past Medical History:   Diagnosis Date     Biliary stricture of transplanted liver (H) 2018     Cancer (H)     hepatocellualr carcinoma     Cirrhosis of liver (H) 2018     Enlarged prostate      Inguinal hernia     Repaired with mesh on 18     Liver lesion 2018     Liver transplanted (H) 2018     donor liver transplant     Portal vein thrombosis     on path explant     Postoperative atrial fibrillation (H) 2018     Pre-diabetes 2018       Past Surgical History:   Procedure Laterality Date     APPENDECTOMY       COLONOSCOPY           ENDOSCOPIC RETROGRADE CHOLANGIOPANCREATOGRAM N/A 2018    Procedure: ENDOSCOPIC RETROGRADE CHOLANGIOPANCREATOGRAM, with Billary Sphincterotomy and Billary Stent Placement;  Surgeon: Omero Lawler MD;  Location: [facility] OR     ENDOSCOPIC RETROGRADE CHOLANGIOPANCREATOGRAM  2019    Procedure: COMBINED ENDOSCOPIC RETROGRADE CHOLANGIOPANCREATOGRAPHY, Bile Duct Stent Exchange;  Surgeon: Omero Lawler MD;  Location: [facility] OR     ENDOSCOPIC RETROGRADE CHOLANGIOPANCREATOGRAM N/A 2019    Procedure: ENDOSCOPIC RETROGRADE CHOLANGIOPANCREATOGRAPHY, WITH BILIARY STENT REMOVAL AND STONE EXTRACTION;  Surgeon: Omero Lawler MD;  Location: [facility] OR     HERNIORRHAPHY INGUINAL  2018    Procedure: Herniorrhaphy inguinal;  Surgeon: Emil Echevarria MD;  Location: [facility] OR     IR LIVER BIOPSY PERCUTANEOUS  2018     TRANSPLANT LIVER RECIPIENT  DONOR N/A 2018    Procedure: TRANSPLANT LIVER RECIPIENT  DONOR;  Surgeon: Emil Echevarria MD;  Location: [facility] OR       Physical Exam  BP (!) 143/77   Pulse (!) 46   Ht 1.702 m (5' 7\")   Wt 83.6 kg (184 lb 6.4 oz)   BMI 28.88 kg/m     Body mass index is 28.88 kg/m .    GENERAL : In no apparent distress  FEET:  No ulcers.    RESULTS  Creatinine "   Date Value Ref Range Status   10/16/2019 0.74 0.66 - 1.25 mg/dL Final     GFR Estimate   Date Value Ref Range Status   10/16/2019 >90 >60 mL/min/[1.73_m2] Final     Comment:     Non  GFR Calc  Starting 12/18/2018, serum creatinine based estimated GFR (eGFR) will be   calculated using the Chronic Kidney Disease Epidemiology Collaboration   (CKD-EPI) equation.       Hemoglobin A1C   Date Value Ref Range Status   09/04/2019 7.3 (H) 0 - 5.6 % Final     Comment:     Normal <5.7% Prediabetes 5.7-6.4%  Diabetes 6.5% or higher - adopted from ADA   consensus guidelines.       Potassium   Date Value Ref Range Status   10/16/2019 5.0 3.4 - 5.3 mmol/L Final     ALT   Date Value Ref Range Status   10/16/2019 23 0 - 70 U/L Final     AST   Date Value Ref Range Status   10/16/2019 12 0 - 45 U/L Final     TSH   Date Value Ref Range Status   10/16/2019 7.66 (H) 0.40 - 4.00 mU/L Final     T4 Free   Date Value Ref Range Status   10/16/2019 0.91 0.76 - 1.46 ng/dL Final       Cholesterol   Date Value Ref Range Status   09/04/2019 128 <200 mg/dL Final   07/10/2019 122 <200 mg/dL Final     HDL Cholesterol   Date Value Ref Range Status   09/04/2019 46 >39 mg/dL Final   07/10/2019 44 >39 mg/dL Final     LDL Cholesterol Calculated   Date Value Ref Range Status   09/04/2019 66 <100 mg/dL Final     Comment:     Desirable:       <100 mg/dl   07/10/2019 65 <100 mg/dL Final     Comment:     Desirable:       <100 mg/dl     Triglycerides   Date Value Ref Range Status   09/04/2019 83 <150 mg/dL Final   07/10/2019 63 <150 mg/dL Final     A1C 7.3 % today.    ASSESSMENT/PLAN:    1. STEROID/IMMUNOSUPPRESSANT INDUCED DIABETES:  Pt is to remain on Metformin 500 mg 2 tabs po BID.  His most recent creat was 0.74 with GFR > 90 mL/min today.  I will arrange for patient to meet with our dietitian and diabetes educator.  No new diabetic meds added today.  He was seen by Oph here in 2/2019 without retinopathy.  Pt's LDL was 66 in 9/2019.  Flu  vaccine given today.    2.  NEUROPATHY: Mild. Pt is taking Gabapentin daily.  No foot ulcers.    3  PROTEINURIA: Will discuss adding an ace or ARB with patient next visit.  Both creat/GFR are normal today.    4.  LIVER TRANSPLANT: Pt followed here.    5.  PROSTATE CANCER: Pt being followed here.    6.  THYROID STATUS: TSH slightly elevated at 7.66 mU/L with a normal FT4.   Will monitor for now and recheck next visit.    7.  Return to Endocrine Clinic to see me in 3 months.    Again, thank you for allowing me to participate in the care of your patient.      Sincerely,    Gilma Guthrie PA-C

## 2019-10-16 NOTE — PROGRESS NOTES
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  He was last seen in our clinic by Roma Lira PA-C in 7/2019.  Pt's has a hx of Laennec's cirrhosis with HCC, portal HTN, latent TB who underwent living donor transplant on 12/22/218.  He developed steroid/immunosuppressant induced diabetes and was treated with NPH insulin which was discontinued once he was no longer taking steroids.  Later follow up showed an A1C value of 8.5 % in May 2019.  He was started on low dose Metformin which was increased last visit to Metformin 500 mg 2 tabs po each am and 2 tabs po each pm.  He denies missing any Metformin doses.  He has been eating more lately.  His A1C was 7.3 % on 9/4/2019.  He has no glucose meter with him today and tells me he has not been checking his blood sugar at home.  On ROS today, he states he was recently dx having prostate cancer and will be having surgery sometime in Nov 2019.  Some intermittent headaches.  He has chronic mild abdominal discomfort since his liver transplant.  No fevers or chills.  His toes are numb.  He reports generalized muscle aches.  Pt denies blurred vision, n/v, SOB at rest or cough.  No chest pain, diarrhea, dysuria or hematuria. No blood in the stool or melena.  No dysuria or hematuria.  No foot ulcers.    Diabetes Care  Retinopathy: none; pt seen by Oph here in 2/2019.  Nephropathy: yes; urine microalbuminuria + today. Will discuss adding an ace or ARB.  Neuropathy: yes.  Foot Exam: no ulcers.  Taking aspirin: yes.  Lipids:LDL 66 in 9/2019.     ROS  Please see under HPI.    Allergies  No Known Allergies    Medications  Current Outpatient Medications   Medication Sig Dispense Refill     alfuzosin ER (UROXATRAL) 10 MG 24 hr tablet Take 1 tablet (10 mg) by mouth daily 90 tablet 3     aluminum chloride (DRYSOL) 20 % external solution Apply topically At Bedtime 60 mL 3     amLODIPine (NORVASC) 10 MG tablet Take 1 tablet  (10 mg) by mouth daily 30 tablet 11     aspirin (ASA) 325 MG EC tablet Take 1 tablet (325 mg) by mouth daily 30 tablet 11     blood glucose (NO BRAND SPECIFIED) lancets standard Use to test blood sugar 4 times daily or as directed. 200 each 11     blood glucose (ONETOUCH VERIO IQ) test strip Use to test blood sugar 4 times daily or as directed. 200 strip 11     gabapentin (NEURONTIN) 100 MG capsule Take 1 capsule (100 mg) by mouth 3 times daily Start 100 mg at bedtime for 1-2 weeks, then 2x daily for 1-2 weeks, then 3x daily thereafter. 90 capsule 3     glucose 40 % (400 mg/mL) GEL gel Take 15-30 g by mouth every 15 minutes as needed for low blood sugar 30 g 3     metFORMIN (GLUCOPHAGE-XR) 500 MG 24 hr tablet Take 4 tablets (2,000 mg) by mouth daily (with dinner) (Patient taking differently: Take 1,500 mg by mouth daily (with dinner) ) 120 tablet 3     metoprolol tartrate 75 MG TABS Take 75 mg by mouth 2 times daily 60 tablet 11     multivitamin w/minerals (THERA-VIT-M) tablet Take 1 tablet by mouth daily 90 tablet 3     Sharps Container MISC 1 each daily 1 each 2     tacrolimus (GENERIC EQUIVALENT) 1 MG capsule Take 5 capsules (5 mg) by mouth every 12 hours 300 capsule 11     urea (GORMEL) 20 % external cream Apply as a moisturizer to your whole body everyday. 480 g 5     ursodiol (ACTIGALL) 250 MG tablet Take 250 mg by mouth 2 times daily         Family History  family history includes Liver Disease in his brother.    Social History   reports that he quit smoking about 19 months ago. His smoking use included cigarettes and cigars. He started smoking about 49 years ago. He has a 0.60 pack-year smoking history. He has never used smokeless tobacco. He reports that he does not drink alcohol or use drugs.     Past Medical History  Past Medical History:   Diagnosis Date     Biliary stricture of transplanted liver (H) 12/28/2018     Cancer (H)     hepatocellualr carcinoma     Cirrhosis of liver (H) 5/8/2018     Enlarged  "prostate      Inguinal hernia     Repaired with mesh on 18     Liver lesion 2018     Liver transplanted (H) 2018     donor liver transplant     Portal vein thrombosis     on path explant     Postoperative atrial fibrillation (H) 2018     Pre-diabetes 2018       Past Surgical History:   Procedure Laterality Date     APPENDECTOMY       COLONOSCOPY           ENDOSCOPIC RETROGRADE CHOLANGIOPANCREATOGRAM N/A 2018    Procedure: ENDOSCOPIC RETROGRADE CHOLANGIOPANCREATOGRAM, with Billary Sphincterotomy and Billary Stent Placement;  Surgeon: Omero Lawler MD;  Location: [facility] OR     ENDOSCOPIC RETROGRADE CHOLANGIOPANCREATOGRAM  2019    Procedure: COMBINED ENDOSCOPIC RETROGRADE CHOLANGIOPANCREATOGRAPHY, Bile Duct Stent Exchange;  Surgeon: Omero Lawler MD;  Location: [facility] OR     ENDOSCOPIC RETROGRADE CHOLANGIOPANCREATOGRAM N/A 2019    Procedure: ENDOSCOPIC RETROGRADE CHOLANGIOPANCREATOGRAPHY, WITH BILIARY STENT REMOVAL AND STONE EXTRACTION;  Surgeon: Omero Lawler MD;  Location: [facility] OR     HERNIORRHAPHY INGUINAL  2018    Procedure: Herniorrhaphy inguinal;  Surgeon: Emil Echeavrria MD;  Location: [facility] OR     IR LIVER BIOPSY PERCUTANEOUS  2018     TRANSPLANT LIVER RECIPIENT  DONOR N/A 2018    Procedure: TRANSPLANT LIVER RECIPIENT  DONOR;  Surgeon: Emil Echevarria MD;  Location: [facility] OR       Physical Exam  BP (!) 143/77   Pulse (!) 46   Ht 1.702 m (5' 7\")   Wt 83.6 kg (184 lb 6.4 oz)   BMI 28.88 kg/m    Body mass index is 28.88 kg/m .    GENERAL : In no apparent distress  FEET:  No ulcers.    RESULTS  Creatinine   Date Value Ref Range Status   10/16/2019 0.74 0.66 - 1.25 mg/dL Final     GFR Estimate   Date Value Ref Range Status   10/16/2019 >90 >60 mL/min/[1.73_m2] Final     Comment:     Non  GFR Calc  Starting 2018, serum creatinine based estimated GFR (eGFR) will be   calculated " using the Chronic Kidney Disease Epidemiology Collaboration   (CKD-EPI) equation.       Hemoglobin A1C   Date Value Ref Range Status   09/04/2019 7.3 (H) 0 - 5.6 % Final     Comment:     Normal <5.7% Prediabetes 5.7-6.4%  Diabetes 6.5% or higher - adopted from ADA   consensus guidelines.       Potassium   Date Value Ref Range Status   10/16/2019 5.0 3.4 - 5.3 mmol/L Final     ALT   Date Value Ref Range Status   10/16/2019 23 0 - 70 U/L Final     AST   Date Value Ref Range Status   10/16/2019 12 0 - 45 U/L Final     TSH   Date Value Ref Range Status   10/16/2019 7.66 (H) 0.40 - 4.00 mU/L Final     T4 Free   Date Value Ref Range Status   10/16/2019 0.91 0.76 - 1.46 ng/dL Final       Cholesterol   Date Value Ref Range Status   09/04/2019 128 <200 mg/dL Final   07/10/2019 122 <200 mg/dL Final     HDL Cholesterol   Date Value Ref Range Status   09/04/2019 46 >39 mg/dL Final   07/10/2019 44 >39 mg/dL Final     LDL Cholesterol Calculated   Date Value Ref Range Status   09/04/2019 66 <100 mg/dL Final     Comment:     Desirable:       <100 mg/dl   07/10/2019 65 <100 mg/dL Final     Comment:     Desirable:       <100 mg/dl     Triglycerides   Date Value Ref Range Status   09/04/2019 83 <150 mg/dL Final   07/10/2019 63 <150 mg/dL Final     A1C 7.3 % today.    ASSESSMENT/PLAN:    1. STEROID/IMMUNOSUPPRESSANT INDUCED DIABETES:  Pt is to remain on Metformin 500 mg 2 tabs po BID.  His most recent creat was 0.74 with GFR > 90 mL/min today.  I will arrange for patient to meet with our dietitian and diabetes educator.  No new diabetic meds added today.  He was seen by Oph here in 2/2019 without retinopathy.  Pt's LDL was 66 in 9/2019.  Flu vaccine given today.    2.  NEUROPATHY: Mild. Pt is taking Gabapentin daily.  No foot ulcers.    3  PROTEINURIA: Will discuss adding an ace or ARB with patient next visit.  Both creat/GFR are normal today.    4.  LIVER TRANSPLANT: Pt followed here.    5.  PROSTATE CANCER: Pt being followed  here.    6.  THYROID STATUS: TSH slightly elevated at 7.66 mU/L with a normal FT4.   Will monitor for now and recheck next visit.    7.  Return to Endocrine Clinic to see me in 3 months.

## 2019-10-16 NOTE — PROGRESS NOTES
Visit to the client's home for annual health risk assessment.  An  was present.    Current situation/living environment  Client lives with his wife in a second floor apartment. Their home is neat and tidy. Client continues to work up to 30 hours per week. He had a liver transplant in Dec. 2018 and will be having a prostatectomy next month due to prostate cancer.    Activities of daily living (ADL)/instrumental activities of daily living (IADL) and functional issues  Client needs help with the following ADL's: n/a-independent  Client needs help with the following IADL's: n/a-independent  Client states he is unable to perform the above due to n/a      Health concerns for today  Client is having prostate surgery next month r/t CA diagnosis. Even though client is very happy with his transplant last December and overall health.   Has patient fallen 2 or more times in the last year? No  Has patient fallen with injury in the last year? No    Cognition/mental health  Client is alert and oriented x3. He denies any s/s of depression or anxiety. He reports some minor forgetfulness.    STARS/Med Adherence  Client is non-compliant with the following quality measures: n/a-up to date  Comments: n/a    Client's Plan of Care consists of:  No formal informal services or supports in place/needed.    Amparo Sandoval RN  [facility] Care Coordinator  [phone]

## 2019-10-18 ENCOUNTER — PREP FOR PROCEDURE (OUTPATIENT)
Dept: UROLOGY | Facility: CLINIC | Age: 64
End: 2019-10-18

## 2019-10-18 ENCOUNTER — TELEPHONE (OUTPATIENT)
Dept: UROLOGY | Facility: CLINIC | Age: 64
End: 2019-10-18

## 2019-10-18 ENCOUNTER — TELEPHONE (OUTPATIENT)
Dept: ENDOCRINOLOGY | Facility: CLINIC | Age: 64
End: 2019-10-18

## 2019-10-18 DIAGNOSIS — E11.65 UNCONTROLLED TYPE 2 DIABETES MELLITUS WITH HYPERGLYCEMIA (H): Primary | ICD-10-CM

## 2019-10-18 DIAGNOSIS — C61 PROSTATE CANCER (H): Primary | ICD-10-CM

## 2019-10-18 NOTE — TELEPHONE ENCOUNTER
----- Message from Gilma Guthrie PA-C sent at 10/18/2019  3:18 PM CDT -----  Regarding: lab results  Please call pt and let him know his thyroid blood test came back indicating he may need to take a small dose of thyroid hormone.  I would first like him to have a fasting lipid panel done and TPO next week when he is here in the [facility] building.   I have ordered these labs and will let him know the results once they are available.  Thanks burton guthrie

## 2019-10-18 NOTE — TELEPHONE ENCOUNTER
Patient is scheduled for surgery with Dr. Casiano      Spoke or left message with: Loy and     Date of Surgery: 1/3/20    Location: [place] OR    Informed patient they will need an adult  yes    Pre-op with surgeon (if applicable): n/a    H&P: Scheduled with PAC 12/16/19    Additional imaging/appointments: n/a    Surgery packet: mailed 10/18/19     Additional comments: n/a

## 2019-10-18 NOTE — TELEPHONE ENCOUNTER
used and scheduled for labs Wednesday 7:45 AM when he is here for another appointment . He is aware to be be fasting for this lab draw. Nikki Begum RN on 10/18/2019 at 5:14 PM

## 2019-10-21 ENCOUNTER — PRE VISIT (OUTPATIENT)
Dept: SURGERY | Facility: CLINIC | Age: 64
End: 2019-10-21

## 2019-10-21 NOTE — TELEPHONE ENCOUNTER
FUTURE VISIT INFORMATION      SURGERY INFORMATION:    urology / surgery 1/3/20    RECORDS REQUESTED FROM:       Primary Care Provider: Jaswinder Anne- [facility]    Pertinent Medical History: Atrial fibrillation, hypertension    Most recent EKG+ Tracin19    Most recent ECHO: 18    Most recent Cardiac Stress Test: 18    Most recent Coronary Angiogram: 18    Most recent PFT's: 18

## 2019-10-23 ENCOUNTER — OFFICE VISIT (OUTPATIENT)
Dept: DERMATOLOGY | Facility: CLINIC | Age: 64
End: 2019-10-23
Payer: COMMERCIAL

## 2019-10-23 VITALS — DIASTOLIC BLOOD PRESSURE: 80 MMHG | HEART RATE: 51 BPM | SYSTOLIC BLOOD PRESSURE: 165 MMHG

## 2019-10-23 DIAGNOSIS — E11.65 UNCONTROLLED TYPE 2 DIABETES MELLITUS WITH HYPERGLYCEMIA (H): ICD-10-CM

## 2019-10-23 DIAGNOSIS — L74.519 FOCAL HYPERHIDROSIS: Primary | ICD-10-CM

## 2019-10-23 DIAGNOSIS — Z94.4 LIVER REPLACED BY TRANSPLANT (H): ICD-10-CM

## 2019-10-23 LAB
ALBUMIN SERPL-MCNC: 3.7 G/DL (ref 3.4–5)
ALP SERPL-CCNC: 177 U/L (ref 40–150)
ALT SERPL W P-5'-P-CCNC: 19 U/L (ref 0–70)
ANION GAP SERPL CALCULATED.3IONS-SCNC: 5 MMOL/L (ref 3–14)
AST SERPL W P-5'-P-CCNC: 11 U/L (ref 0–45)
BILIRUB DIRECT SERPL-MCNC: 0.2 MG/DL (ref 0–0.2)
BILIRUB SERPL-MCNC: 0.7 MG/DL (ref 0.2–1.3)
BUN SERPL-MCNC: 20 MG/DL (ref 7–30)
CALCIUM SERPL-MCNC: 8.4 MG/DL (ref 8.5–10.1)
CHLORIDE SERPL-SCNC: 106 MMOL/L (ref 94–109)
CHOLEST SERPL-MCNC: 126 MG/DL
CO2 SERPL-SCNC: 26 MMOL/L (ref 20–32)
CREAT SERPL-MCNC: 0.68 MG/DL (ref 0.66–1.25)
ERYTHROCYTE [DISTWIDTH] IN BLOOD BY AUTOMATED COUNT: 12.8 % (ref 10–15)
GFR SERPL CREATININE-BSD FRML MDRD: >90 ML/MIN/{1.73_M2}
GLUCOSE SERPL-MCNC: 150 MG/DL (ref 70–99)
HCT VFR BLD AUTO: 35.9 % (ref 40–53)
HDLC SERPL-MCNC: 38 MG/DL
HGB BLD-MCNC: 11.9 G/DL (ref 13.3–17.7)
LDLC SERPL CALC-MCNC: 71 MG/DL
MAGNESIUM SERPL-MCNC: 1.9 MG/DL (ref 1.6–2.3)
MCH RBC QN AUTO: 28.5 PG (ref 26.5–33)
MCHC RBC AUTO-ENTMCNC: 33.1 G/DL (ref 31.5–36.5)
MCV RBC AUTO: 86 FL (ref 78–100)
NONHDLC SERPL-MCNC: 89 MG/DL
PHOSPHATE SERPL-MCNC: 3.3 MG/DL (ref 2.5–4.5)
PLATELET # BLD AUTO: 180 10E9/L (ref 150–450)
POTASSIUM SERPL-SCNC: 4.4 MMOL/L (ref 3.4–5.3)
PROT SERPL-MCNC: 7 G/DL (ref 6.8–8.8)
RBC # BLD AUTO: 4.17 10E12/L (ref 4.4–5.9)
SODIUM SERPL-SCNC: 138 MMOL/L (ref 133–144)
TACROLIMUS BLD-MCNC: 10.4 UG/L (ref 5–15)
THYROPEROXIDASE AB SERPL-ACNC: <10 IU/ML
TME LAST DOSE: NORMAL H
TRIGL SERPL-MCNC: 89 MG/DL
WBC # BLD AUTO: 4.7 10E9/L (ref 4–11)

## 2019-10-23 ASSESSMENT — PAIN SCALES - GENERAL: PAINLEVEL: NO PAIN (0)

## 2019-10-23 NOTE — PROGRESS NOTES
[facility] Dermatology Note      Dermatology Problem List:  1. Liver transplant (12/22/18)  - Current Tx: tacrolimus  2. Skin sensitivity/peripheral neuropathy post transplant in setting of new diagnosis of diabetes (A1c 8.6%; up from 5.8% pre-transplant)  3. Hyperhidrosis of the soles of feet  - Current Tx: Aluminum chloride 20% solution     Encounter Date: Oct 23, 2019    CC:  Chief Complaint   Patient presents with     Derm Problem     Peripheral polyneuropathy - Loy says the medication has not been helping         History of Present Illness:  Mr. Loy Limon is a 64 year old male who presents as a follow-up for hyperhydrosis. The patient was last seen 8/26/19 when he started topical aluminum chloride. Today, the patient reports that he has not noticed much of any durable improvement in the sweating with the topical aluminum chloride. He notes that he would have about a day of relief after applying the solution, but the sweating then recurred. He states that there was only enough medication in the prescription to last him about a month, though he states he did not return to the pharmacy to refill the medication. The patient voices no other concerns.      Past Medical History:   Patient Active Problem List   Diagnosis     Cirrhosis of liver (H)     Liver lesion     HCC (hepatocellular carcinoma) (H)     Liver transplant recipient (H)     Immunosuppressed status (H)     Hypervolemia     Atrial fibrillation with rapid ventricular response (H)     Hematuria     Bilateral wheezing     Hypoxia     Hyperglycemia     Pre-diabetes     Essential hypertension     Constipation     Biliary stricture of transplanted liver (H)     Drug-induced diabetes mellitus (H)     Muscle tension dysphonia     Laennec's cirrhosis (H)     Elevated ferritin     Inguinal hernia of right side with obstruction and without gangrene     Portal hypertension (H)     Right sided abdominal pain     Tobacco use disorder      Past Medical History:   Diagnosis Date     Biliary stricture of transplanted liver (H) 2018     Cancer (H)     hepatocellualr carcinoma     Cirrhosis of liver (H) 2018     Enlarged prostate      Inguinal hernia     Repaired with mesh on 18     Liver lesion 2018     Liver transplanted (H) 2018     donor liver transplant     Portal vein thrombosis     on path explant     Postoperative atrial fibrillation (H) 2018     Pre-diabetes 2018     Past Surgical History:   Procedure Laterality Date     APPENDECTOMY       COLONOSCOPY           ENDOSCOPIC RETROGRADE CHOLANGIOPANCREATOGRAM N/A 2018    Procedure: ENDOSCOPIC RETROGRADE CHOLANGIOPANCREATOGRAM, with Billary Sphincterotomy and Billary Stent Placement;  Surgeon: Omero Lawler MD;  Location: [facility] OR     ENDOSCOPIC RETROGRADE CHOLANGIOPANCREATOGRAM  2019    Procedure: COMBINED ENDOSCOPIC RETROGRADE CHOLANGIOPANCREATOGRAPHY, Bile Duct Stent Exchange;  Surgeon: Omero Lawler MD;  Location: [facility] OR     ENDOSCOPIC RETROGRADE CHOLANGIOPANCREATOGRAM N/A 2019    Procedure: ENDOSCOPIC RETROGRADE CHOLANGIOPANCREATOGRAPHY, WITH BILIARY STENT REMOVAL AND STONE EXTRACTION;  Surgeon: Omero Lawler MD;  Location: [facility] OR     HERNIORRHAPHY INGUINAL  2018    Procedure: Herniorrhaphy inguinal;  Surgeon: Emil Echevarria MD;  Location: [facility] OR     IR LIVER BIOPSY PERCUTANEOUS  2018     TRANSPLANT LIVER RECIPIENT  DONOR N/A 2018    Procedure: TRANSPLANT LIVER RECIPIENT  DONOR;  Surgeon: Emil Echevarria MD;  Location: [facility] OR       Social History:  Patient reports that he quit smoking about 19 months ago. His smoking use included cigarettes and cigars. He started smoking about 49 years ago. He has a 0.60 pack-year smoking history. He has never used smokeless tobacco. He reports that he does not drink alcohol or use drugs.    Family History:  Family History    Problem Relation Age of Onset     Liver Disease Brother      Skin Cancer No family hx of      Melanoma No family hx of        Medications:  Current Outpatient Medications   Medication Sig Dispense Refill     alfuzosin ER (UROXATRAL) 10 MG 24 hr tablet Take 1 tablet (10 mg) by mouth daily 90 tablet 3     aluminum chloride (DRYSOL) 20 % external solution Apply topically At Bedtime 60 mL 3     amLODIPine (NORVASC) 10 MG tablet Take 1 tablet (10 mg) by mouth daily 30 tablet 11     aspirin (ASA) 325 MG EC tablet Take 1 tablet (325 mg) by mouth daily 30 tablet 11     blood glucose (NO BRAND SPECIFIED) lancets standard Use to test blood sugar 4 times daily or as directed. 200 each 11     blood glucose (ONETOUCH VERIO IQ) test strip Use to test blood sugar 4 times daily or as directed. 200 strip 11     gabapentin (NEURONTIN) 100 MG capsule Take 1 capsule (100 mg) by mouth 3 times daily Start 100 mg at bedtime for 1-2 weeks, then 2x daily for 1-2 weeks, then 3x daily thereafter. 90 capsule 3     glucose 40 % (400 mg/mL) GEL gel Take 15-30 g by mouth every 15 minutes as needed for low blood sugar 30 g 3     metFORMIN (GLUCOPHAGE-XR) 500 MG 24 hr tablet Take 4 tablets (2,000 mg) by mouth daily (with dinner) (Patient taking differently: Take 1,500 mg by mouth daily (with dinner) ) 120 tablet 3     metoprolol tartrate 75 MG TABS Take 75 mg by mouth 2 times daily 60 tablet 11     multivitamin w/minerals (THERA-VIT-M) tablet Take 1 tablet by mouth daily 90 tablet 3     Sharps Container MISC 1 each daily 1 each 2     tacrolimus (GENERIC EQUIVALENT) 1 MG capsule Take 5 capsules (5 mg) by mouth every 12 hours 300 capsule 11     urea (GORMEL) 20 % external cream Apply as a moisturizer to your whole body everyday. 480 g 5     ursodiol (ACTIGALL) 250 MG tablet Take 250 mg by mouth 2 times daily         No Known Allergies    Review of Systems:  -Constitutional: Otherwise feeling well today, in usual state of health.  -HEENT: Patient  denies nonhealing oral sores.  -Skin: As above in HPI. No additional skin concerns.    Physical exam:  Vitals: BP (!) 165/80 (BP Location: Right arm, Patient Position: Sitting, Cuff Size: Adult Regular)   Pulse 51   GEN: This is a well developed, well-nourished male in no acute distress, in a pleasant mood.    SKIN: Focused examination of the bilateral feet was performed.  -Doll skin type: IV  -no active sweating is appreciated on the plantar feet  -No other lesions of concern on areas examined.       Impression/Plan:  1. Hyperhidrosis of the plantar feet    We discussed the risks, benefits, and efficacy of continued treatment with topical aluminum chloride as well as oral glycopyrrolate. The patient declines any treatment with oral medications and elects to continue topical use    We discussed the need for persistent application of aluminum chloride to have continued control of his sweating. These topicals do not result in durable relief of symptoms and require consistent use to control symptoms.    Continue aluminum chloride 20% solution BID    Follow-up in 6 months, earlier for new or changing lesions.       Staff Involved:  Scribe/Staff    Scribe Disclosure  I, Dominic Najjar, am serving as a scribe to document services personally performed by Dr. Rolly Smith MD, based on data collection and the provider's statements to me.    Provider Disclosure:   The documentation recorded by the scribe accurately reflects the services I personally performed and the decisions made by me.    Rolly Smith MD   of Dermatology  Department of Dermatology  [facility]

## 2019-10-23 NOTE — LETTER
10/23/2019       RE: Loy Limon  [address]  [place] [id number]     Dear Colleague,    Thank you for referring your patient, Loy Limon, to the [facility] DERMATOLOGY at [facility]. Please see a copy of my visit note below.    [facility] Dermatology Note      Dermatology Problem List:  1. Liver transplant (12/22/18)  - Current Tx: tacrolimus  2. Skin sensitivity/peripheral neuropathy post transplant in setting of new diagnosis of diabetes (A1c 8.6%; up from 5.8% pre-transplant)  3. Hyperhidrosis of the soles of feet  - Current Tx: Aluminum chloride 20% solution     Encounter Date: Oct 23, 2019    CC:  Chief Complaint   Patient presents with     Derm Problem     Peripheral polyneuropathy - Loy says the medication has not been helping         History of Present Illness:  Mr. Loy Limon is a 64 year old male who presents as a follow-up for hyperhydrosis. The patient was last seen 8/26/19 when he started topical aluminum chloride. Today, the patient reports that he has not noticed much of any durable improvement in the sweating with the topical aluminum chloride. He notes that he would have about a day of relief after applying the solution, but the sweating then recurred. He states that there was only enough medication in the prescription to last him about a month, though he states he did not return to the pharmacy to refill the medication. The patient voices no other concerns.      Past Medical History:   Patient Active Problem List   Diagnosis     Cirrhosis of liver (H)     Liver lesion     HCC (hepatocellular carcinoma) (H)     Liver transplant recipient (H)     Immunosuppressed status (H)     Hypervolemia     Atrial fibrillation with rapid ventricular response (H)     Hematuria     Bilateral wheezing     Hypoxia     Hyperglycemia     Pre-diabetes     Essential hypertension     Constipation     Biliary stricture of  transplanted liver (H)     Drug-induced diabetes mellitus (H)     Muscle tension dysphonia     Laennec's cirrhosis (H)     Elevated ferritin     Inguinal hernia of right side with obstruction and without gangrene     Portal hypertension (H)     Right sided abdominal pain     Tobacco use disorder     Past Medical History:   Diagnosis Date     Biliary stricture of transplanted liver (H) 2018     Cancer (H)     hepatocellualr carcinoma     Cirrhosis of liver (H) 2018     Enlarged prostate      Inguinal hernia     Repaired with mesh on 18     Liver lesion 2018     Liver transplanted (H) 2018     donor liver transplant     Portal vein thrombosis     on path explant     Postoperative atrial fibrillation (H) 2018     Pre-diabetes 2018     Past Surgical History:   Procedure Laterality Date     APPENDECTOMY       COLONOSCOPY           ENDOSCOPIC RETROGRADE CHOLANGIOPANCREATOGRAM N/A 2018    Procedure: ENDOSCOPIC RETROGRADE CHOLANGIOPANCREATOGRAM, with Billary Sphincterotomy and Billary Stent Placement;  Surgeon: Omreo Lawler MD;  Location: [facility]     ENDOSCOPIC RETROGRADE CHOLANGIOPANCREATOGRAM  2019    Procedure: COMBINED ENDOSCOPIC RETROGRADE CHOLANGIOPANCREATOGRAPHY, Bile Duct Stent Exchange;  Surgeon: Omero Lawler MD;  Location: [facility] OR     ENDOSCOPIC RETROGRADE CHOLANGIOPANCREATOGRAM N/A 2019    Procedure: ENDOSCOPIC RETROGRADE CHOLANGIOPANCREATOGRAPHY, WITH BILIARY STENT REMOVAL AND STONE EXTRACTION;  Surgeon: Omero Lawler MD;  Location: [facility] OR     HERNIORRHAPHY INGUINAL  2018    Procedure: Herniorrhaphy inguinal;  Surgeon: Emil Echevarria MD;  Location: [facility] OR     IR LIVER BIOPSY PERCUTANEOUS  2018     TRANSPLANT LIVER RECIPIENT  DONOR N/A 2018    Procedure: TRANSPLANT LIVER RECIPIENT  DONOR;  Surgeon: Emil Echevarria MD;  Location: [facility] OR       Social History:  Patient reports that  he quit smoking about 19 months ago. His smoking use included cigarettes and cigars. He started smoking about 49 years ago. He has a 0.60 pack-year smoking history. He has never used smokeless tobacco. He reports that he does not drink alcohol or use drugs.    Family History:  Family History   Problem Relation Age of Onset     Liver Disease Brother      Skin Cancer No family hx of      Melanoma No family hx of        Medications:  Current Outpatient Medications   Medication Sig Dispense Refill     alfuzosin ER (UROXATRAL) 10 MG 24 hr tablet Take 1 tablet (10 mg) by mouth daily 90 tablet 3     aluminum chloride (DRYSOL) 20 % external solution Apply topically At Bedtime 60 mL 3     amLODIPine (NORVASC) 10 MG tablet Take 1 tablet (10 mg) by mouth daily 30 tablet 11     aspirin (ASA) 325 MG EC tablet Take 1 tablet (325 mg) by mouth daily 30 tablet 11     blood glucose (NO BRAND SPECIFIED) lancets standard Use to test blood sugar 4 times daily or as directed. 200 each 11     blood glucose (ONETOUCH VERIO IQ) test strip Use to test blood sugar 4 times daily or as directed. 200 strip 11     gabapentin (NEURONTIN) 100 MG capsule Take 1 capsule (100 mg) by mouth 3 times daily Start 100 mg at bedtime for 1-2 weeks, then 2x daily for 1-2 weeks, then 3x daily thereafter. 90 capsule 3     glucose 40 % (400 mg/mL) GEL gel Take 15-30 g by mouth every 15 minutes as needed for low blood sugar 30 g 3     metFORMIN (GLUCOPHAGE-XR) 500 MG 24 hr tablet Take 4 tablets (2,000 mg) by mouth daily (with dinner) (Patient taking differently: Take 1,500 mg by mouth daily (with dinner) ) 120 tablet 3     metoprolol tartrate 75 MG TABS Take 75 mg by mouth 2 times daily 60 tablet 11     multivitamin w/minerals (THERA-VIT-M) tablet Take 1 tablet by mouth daily 90 tablet 3     Sharps Container MISC 1 each daily 1 each 2     tacrolimus (GENERIC EQUIVALENT) 1 MG capsule Take 5 capsules (5 mg) by mouth every 12 hours 300 capsule 11     urea (GORMEL) 20  % external cream Apply as a moisturizer to your whole body everyday. 480 g 5     ursodiol (ACTIGALL) 250 MG tablet Take 250 mg by mouth 2 times daily         No Known Allergies    Review of Systems:  -Constitutional: Otherwise feeling well today, in usual state of health.  -HEENT: Patient denies nonhealing oral sores.  -Skin: As above in HPI. No additional skin concerns.    Physical exam:  Vitals: BP (!) 165/80 (BP Location: Right arm, Patient Position: Sitting, Cuff Size: Adult Regular)   Pulse 51   GEN: This is a well developed, well-nourished male in no acute distress, in a pleasant mood.    SKIN: Focused examination of the bilateral feet was performed.  -Doll skin type: IV  -no active sweating is appreciated on the plantar feet  -No other lesions of concern on areas examined.       Impression/Plan:  1. Hyperhidrosis of the plantar feet    We discussed the risks, benefits, and efficacy of continued treatment with topical aluminum chloride as well as oral glycopyrrolate. The patient declines any treatment with oral medications and elects to continue topical use    We discussed the need for persistent application of aluminum chloride to have continued control of his sweating. These topicals do not result in durable relief of symptoms and require consistent use to control symptoms.    Continue aluminum chloride 20% solution BID    Follow-up in 6 months, earlier for new or changing lesions.       Staff Involved:  Scribe/Staff    Scribe Disclosure  I, Dominic Najjar, am serving as a scribe to document services personally performed by Dr. Rolly Smith MD, based on data collection and the provider's statements to me.    Provider Disclosure:   The documentation recorded by the scribe accurately reflects the services I personally performed and the decisions made by me.    Rolly Smith MD   of Dermatology  Department of Dermatology  [facility]

## 2019-10-23 NOTE — NURSING NOTE
Dermatology Rooming Note    Loy Limon's goals for this visit include:   Chief Complaint   Patient presents with     Derm Problem     Peripheral polyneuropathy - Loy says the medication has not been helping     Chi Mason, CMA

## 2019-10-24 ENCOUNTER — TELEPHONE (OUTPATIENT)
Dept: ENDOCRINOLOGY | Facility: CLINIC | Age: 64
End: 2019-10-24

## 2019-10-24 DIAGNOSIS — E03.8 OTHER SPECIFIED HYPOTHYROIDISM: Primary | ICD-10-CM

## 2019-10-24 DIAGNOSIS — Z94.4 LIVER TRANSPLANT RECIPIENT (H): ICD-10-CM

## 2019-10-24 PROBLEM — C61 PROSTATE CANCER (H): Status: ACTIVE | Noted: 2019-10-24

## 2019-10-24 RX ORDER — TACROLIMUS 1 MG/1
4 CAPSULE ORAL EVERY 12 HOURS
Qty: 240 CAPSULE | Refills: 11 | Status: SHIPPED | OUTPATIENT
Start: 2019-10-24 | End: 2019-11-06

## 2019-10-24 RX ORDER — LEVOTHYROXINE SODIUM 25 UG/1
25 TABLET ORAL DAILY
Qty: 90 TABLET | Refills: 1 | Status: SHIPPED | OUTPATIENT
Start: 2019-10-24 | End: 2019-12-16

## 2019-10-24 NOTE — TELEPHONE ENCOUNTER
Instructed pt through interpretor [facility] #[id number] to change tacro to 4mg am, 4mg pm and repeat labs in 2 weeks with accurated timed prograf level. Pt able to repeat instructions.

## 2019-10-24 NOTE — TELEPHONE ENCOUNTER
----- Message from Gilma Guthrie PA-C sent at 10/24/2019  9:11 AM CDT -----  Could you please call pt let him know his fasting cholesterol panel results are good.  I would like to start him on a low dose of thyroid medication since his TSH was mildly elevated suggestive of mild hypothyroidism.  I sent a RX for Levothyroxine 25 mcg po daily to his pharmacy.  I would like to recheck his TSH/FT4 in 10 weeks and I'll place an order for this lab.  Thanks burton guthrie

## 2019-10-24 NOTE — TELEPHONE ENCOUNTER
Spoke w/ Pt Via [place]ian speaking , confirms understanding of review and recommendations from Gilma Guthrie and will  medication today and get labs in 10 weeks. No questions or concerns at this time.   Maya Brown RN on 10/24/2019 at 9:28 AM

## 2019-11-06 ENCOUNTER — ALLIED HEALTH/NURSE VISIT (OUTPATIENT)
Dept: EDUCATION SERVICES | Facility: CLINIC | Age: 64
End: 2019-11-06
Payer: COMMERCIAL

## 2019-11-06 VITALS — WEIGHT: 182.5 LBS | BODY MASS INDEX: 28.58 KG/M2

## 2019-11-06 DIAGNOSIS — Z94.4 LIVER REPLACED BY TRANSPLANT (H): ICD-10-CM

## 2019-11-06 DIAGNOSIS — E09.9 DRUG-INDUCED DIABETES MELLITUS (H): Primary | ICD-10-CM

## 2019-11-06 DIAGNOSIS — Z94.4 LIVER TRANSPLANT RECIPIENT (H): ICD-10-CM

## 2019-11-06 LAB
ALBUMIN SERPL-MCNC: 3.9 G/DL (ref 3.4–5)
ALP SERPL-CCNC: 165 U/L (ref 40–150)
ALT SERPL W P-5'-P-CCNC: 20 U/L (ref 0–70)
ANION GAP SERPL CALCULATED.3IONS-SCNC: 4 MMOL/L (ref 3–14)
AST SERPL W P-5'-P-CCNC: 13 U/L (ref 0–45)
BILIRUB DIRECT SERPL-MCNC: 0.3 MG/DL (ref 0–0.2)
BILIRUB SERPL-MCNC: 1 MG/DL (ref 0.2–1.3)
BUN SERPL-MCNC: 21 MG/DL (ref 7–30)
CALCIUM SERPL-MCNC: 8.8 MG/DL (ref 8.5–10.1)
CHLORIDE SERPL-SCNC: 108 MMOL/L (ref 94–109)
CO2 SERPL-SCNC: 26 MMOL/L (ref 20–32)
CREAT SERPL-MCNC: 0.87 MG/DL (ref 0.66–1.25)
ERYTHROCYTE [DISTWIDTH] IN BLOOD BY AUTOMATED COUNT: 12.7 % (ref 10–15)
GFR SERPL CREATININE-BSD FRML MDRD: 90 ML/MIN/{1.73_M2}
GLUCOSE SERPL-MCNC: 162 MG/DL (ref 70–99)
HCT VFR BLD AUTO: 37.8 % (ref 40–53)
HGB BLD-MCNC: 12.6 G/DL (ref 13.3–17.7)
MAGNESIUM SERPL-MCNC: 1.8 MG/DL (ref 1.6–2.3)
MCH RBC QN AUTO: 29 PG (ref 26.5–33)
MCHC RBC AUTO-ENTMCNC: 33.3 G/DL (ref 31.5–36.5)
MCV RBC AUTO: 87 FL (ref 78–100)
PHOSPHATE SERPL-MCNC: 3 MG/DL (ref 2.5–4.5)
PLATELET # BLD AUTO: 164 10E9/L (ref 150–450)
POTASSIUM SERPL-SCNC: 4.8 MMOL/L (ref 3.4–5.3)
PROT SERPL-MCNC: 7.1 G/DL (ref 6.8–8.8)
RBC # BLD AUTO: 4.35 10E12/L (ref 4.4–5.9)
SODIUM SERPL-SCNC: 138 MMOL/L (ref 133–144)
TACROLIMUS BLD-MCNC: 8 UG/L (ref 5–15)
TME LAST DOSE: 1900 H
WBC # BLD AUTO: 4.3 10E9/L (ref 4–11)

## 2019-11-06 RX ORDER — TACROLIMUS 1 MG/1
3 CAPSULE ORAL EVERY 12 HOURS
Qty: 180 CAPSULE | Refills: 11 | Status: SHIPPED | OUTPATIENT
Start: 2019-11-06 | End: 2020-01-21

## 2019-11-06 NOTE — TELEPHONE ENCOUNTER
Tacrolimus level 8 on 11/6, goal 5-7.  Current dose per Epic 4 mg in AM and 4 mg in PM.      Please call patient and confirm that this current dose is correct and that drug level was a 12 hour trough level.      Confirm no new medications or illness.      If trough level was accurately timed,/ decrease dose to 3 mg in the morning and 3 mg in the evening.  Repeat Tacrolimus level in 1 week.

## 2019-11-06 NOTE — PROGRESS NOTES
"Diabetes Self-Management Education & Support    Diabetes Education Self Management & Training    SUBJECTIVE/OBJECTIVE:     Cultural Influences/Ethnic Background:  [place]  Liver transplant for cirrhosis and hepatobiliary cancer done 2018.  States that he was pre-diabetic prior to surgery.  + family history of diabetes--states that his brother \" from diabetes\".      Diabetes Symptoms & Complications    Patient Problem List and Family Medical History reviewed for relevant medical history, current medical status, and diabetes risk factors.    Vitals:  There were no vitals taken for this visit.  Estimated body mass index is 28.88 kg/m  as calculated from the following:    Height as of 10/16/19: 1.702 m (5' 7\").    Weight as of 10/16/19: 83.6 kg (184 lb 6.4 oz).   Last 3 BP:   BP Readings from Last 3 Encounters:   10/23/19 (!) 165/80   10/16/19 (!) 143/77   19 129/69       History   Smoking Status     Former Smoker     Packs/day: 0.03     Years: 20.00     Types: Cigarettes, Cigars     Start date: 1970     Quit date: 3/14/2018   Smokeless Tobacco     Never Used     Comment: Quit smoking 2018, one cigarette after dinner       Labs:  Lab Results   Component Value Date    A1C 7.3 2019     Lab Results   Component Value Date     2019     Lab Results   Component Value Date    LDL 71 10/23/2019     HDL Cholesterol   Date Value Ref Range Status   10/23/2019 38 (L) >39 mg/dL Final   ]  GFR Estimate   Date Value Ref Range Status   2019 90 >60 mL/min/[1.73_m2] Final     Comment:     Non  GFR Calc  Starting 2018, serum creatinine based estimated GFR (eGFR) will be   calculated using the Chronic Kidney Disease Epidemiology Collaboration   (CKD-EPI) equation.       GFR Estimate If Black   Date Value Ref Range Status   2019 >90 >60 mL/min/[1.73_m2] Final     Comment:      GFR Calc  Starting 2018, serum creatinine based estimated GFR " (eGFR) will be   calculated using the Chronic Kidney Disease Epidemiology Collaboration   (CKD-EPI) equation.       Lab Results   Component Value Date    CR 0.87 11/06/2019     No results found for: MICROALBUMIN    Healthy Eating  Other: (P) Both himself and wife cook.   Beverages: (P) Coffee  See Izabela Valles's note from today for details.     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 He stands on his feet for six hours at a time, and is not allowed many breaks, although he states that he does get a break to eat.  He works M, T, Th and F from 8:00am to 2:30pm.  Outside of his job, he doesn't have any sort of regular exercise program.         Monitoring:  One Touch Verio IQ meter which he did not bring with him today.  He is checking twice daily, most days.  States that his fasting blood glucose is between 140 and 170 and his blood glucoses seem to be higher as the day goes on.  He wonders if I am going to prescribe more medication for him, as he doesn't want to take more meds.      Taking Medications  Diabetes Medication(s)     Biguanides       metFORMIN (GLUCOPHAGE-XR) 500 MG 24 hr tablet    Take 4 tablets (2,000 mg) by mouth daily (with dinner)     Patient taking differently:  Take 1,500 mg by mouth daily (with dinner)     Diabetic Other       glucose 40 % (400 mg/mL) GEL gel    Take 15-30 g by mouth every 15 minutes as needed for low blood sugar        Patient Activation Measure Survey Score:  No flowsheet data found.    ASSESSMENT:    Loy has not had previous diabetes education.  He attends today with his wife Drea and .  He understands that his blood glucose levels are higher than they should be and doesn't want to add more medications, so wondering what to do.      Patient's most recent   Lab Results   Component Value Date    A1C 7.3 09/04/2019    is not meeting goal of <7.0    INTERVENTION:   Diabetes  "knowledge and skills assessment:     Patient is knowledgeable in diabetes management concepts related to: Needs full diabetes education.  Requires .     Based on learning assessment above, most appropriate setting for further diabetes education would be: Individual setting.    Education provided today on:  AADE Self-Care Behaviors:    Simple explanation of pathophysiology of diabetes.  Discussed the effect that anti-rejection meds and steroids have on blood glucose control.      Discussed the role of weight loss and increased activity in managing diabetes.  Encouraged him to be active for 30 minutes a day, in addition to his job.  Suggested walking 30 minutes per day either inside or outside.  He lives in an apartment building which has stairs.  Suggested that he walk the hallways of his building or consider stair walking.  He was interested in possibly joining the [employer] or [employer].  Discussed resistance training as helpful in promoting insulin sensitivity.      Discussed testing and came up with a testing schedule:  Fasting and two hours post prandial.  Instructed to bring his meter to his next appointment.       Opportunities for ongoing education and support in diabetes-self management were discussed.  Topics that need to be addressed at future visits:  Sick day guidelines  Risk Reduction/Prevention of complications  Medication.    Pt verbalized understanding of concepts discussed and recommendations provided today.       Education Materials Provided:  [place] \"Monitoring Blood Glucose\"     PLAN:  See Patient Instructions for co-developed, patient-stated behavior change goals.  AVS printed and provided to patient today. See Follow-Up section for recommended follow-up.    Time Spent: 60 minutes  Encounter Type: Individual    Any diabetes medication dose changes were made via the CDE Protocol and Collaborative Practice Agreement with the patient's referring provider. A copy of this encounter was shared with " the provider.

## 2019-11-06 NOTE — TELEPHONE ENCOUNTER
Through interpretor [facility] #[id number] confirmed with pt that the tacro level was a 12 hour trough and to change the tacro to 3mg am, 3mg pm and recheck tacro level next week. Pt able to repeat instructions.

## 2019-11-07 ENCOUNTER — TELEPHONE (OUTPATIENT)
Dept: TRANSPLANT | Facility: CLINIC | Age: 64
End: 2019-11-07

## 2019-11-07 DIAGNOSIS — E11.9 DM TYPE 2, GOAL HBA1C 7%-8% (H): ICD-10-CM

## 2019-11-07 RX ORDER — METFORMIN HCL 500 MG
1000 TABLET, EXTENDED RELEASE 24 HR ORAL 2 TIMES DAILY WITH MEALS
Qty: 120 TABLET | Refills: 3 | Status: SHIPPED | OUTPATIENT
Start: 2019-11-07 | End: 2020-01-21

## 2019-11-07 NOTE — TELEPHONE ENCOUNTER
METFORMIN HCL ER 500MG       Last Written Prescription Date:  6/5/19  Last Fill Quantity: 120,   # refills: 3  Last Office Visit : 9/4/19  Future Office visit:  1/8/20    Routing refill request to provider for review/approval because:  Metformin requested from pharmacy for 4 tabs every day with dinner, 2000 mg  Patient reports is taking 1500 mg daily with dinner  Which dose would provider feel is appropriate to prescribe?    Dose pended is what was prescribed.

## 2019-11-07 NOTE — TELEPHONE ENCOUNTER
Call from Claribel Hanna's supervisor at work asking if Johan can return to work full time plus and what his lifting restriction might be.  I emailed Dr. Echevarria to ask what the max lifting restriction would be.  I called Claribel w/ the assistance of a .  He says that if he works more than 40 hours his insurance cost will go up by $200 / mo.    Message to Peyton Barrios to see if she knows if there is any other insurance option for him.

## 2019-11-10 ENCOUNTER — DOCUMENTATION ONLY (OUTPATIENT)
Dept: CARE COORDINATION | Facility: CLINIC | Age: 64
End: 2019-11-10

## 2019-11-14 ENCOUNTER — TELEPHONE (OUTPATIENT)
Dept: TRANSPLANT | Facility: CLINIC | Age: 64
End: 2019-11-14

## 2019-11-14 NOTE — TELEPHONE ENCOUNTER
Transplant Social Work Services Phone Call      Data: Referral received from Jeny Szymanski, Liver Transplant Coordinator re: patient's health insurance concerns.  Intervention: Called patient along with a Mongolian speaking .  Unable to reach patient, message left by  to call me.   Assessment: deferred  Education provided by SW: deferred  Plan: Awaiting return call from patient.  I will remain available to assist patient.      ABRAHAM Gaming, [facility]  Liver Transplant   Phone [phone]  Pager [phone]

## 2019-11-18 DIAGNOSIS — Z94.4 LIVER REPLACED BY TRANSPLANT (H): ICD-10-CM

## 2019-11-18 LAB
ALBUMIN SERPL-MCNC: 3.6 G/DL (ref 3.4–5)
ALP SERPL-CCNC: 182 U/L (ref 40–150)
ALT SERPL W P-5'-P-CCNC: 19 U/L (ref 0–70)
ANION GAP SERPL CALCULATED.3IONS-SCNC: 4 MMOL/L (ref 3–14)
AST SERPL W P-5'-P-CCNC: 8 U/L (ref 0–45)
BILIRUB DIRECT SERPL-MCNC: 0.2 MG/DL (ref 0–0.2)
BILIRUB SERPL-MCNC: 0.7 MG/DL (ref 0.2–1.3)
BUN SERPL-MCNC: 17 MG/DL (ref 7–30)
CALCIUM SERPL-MCNC: 8.4 MG/DL (ref 8.5–10.1)
CHLORIDE SERPL-SCNC: 109 MMOL/L (ref 94–109)
CO2 SERPL-SCNC: 26 MMOL/L (ref 20–32)
CREAT SERPL-MCNC: 0.72 MG/DL (ref 0.66–1.25)
ERYTHROCYTE [DISTWIDTH] IN BLOOD BY AUTOMATED COUNT: 12.7 % (ref 10–15)
GFR SERPL CREATININE-BSD FRML MDRD: >90 ML/MIN/{1.73_M2}
GLUCOSE SERPL-MCNC: 153 MG/DL (ref 70–99)
HCT VFR BLD AUTO: 36.5 % (ref 40–53)
HGB BLD-MCNC: 12.1 G/DL (ref 13.3–17.7)
MAGNESIUM SERPL-MCNC: 2 MG/DL (ref 1.6–2.3)
MCH RBC QN AUTO: 29 PG (ref 26.5–33)
MCHC RBC AUTO-ENTMCNC: 33.2 G/DL (ref 31.5–36.5)
MCV RBC AUTO: 88 FL (ref 78–100)
PHOSPHATE SERPL-MCNC: 2.4 MG/DL (ref 2.5–4.5)
PLATELET # BLD AUTO: 169 10E9/L (ref 150–450)
POTASSIUM SERPL-SCNC: 4.9 MMOL/L (ref 3.4–5.3)
PROT SERPL-MCNC: 6.5 G/DL (ref 6.8–8.8)
RBC # BLD AUTO: 4.17 10E12/L (ref 4.4–5.9)
SODIUM SERPL-SCNC: 139 MMOL/L (ref 133–144)
TACROLIMUS BLD-MCNC: 7 UG/L (ref 5–15)
TME LAST DOSE: NORMAL H
WBC # BLD AUTO: 4.1 10E9/L (ref 4–11)

## 2019-11-19 ENCOUNTER — TELEPHONE (OUTPATIENT)
Dept: FAMILY MEDICINE | Facility: CLINIC | Age: 64
End: 2019-11-19

## 2019-11-19 NOTE — TELEPHONE ENCOUNTER
Health Call Center    Phone Message    May a detailed message be left on voicemail: yes    Reason for Call: Medication Refill Request    Has the patient contacted the pharmacy for the refill? Yes   Name of medication being requested: metFORMIN   Provider who prescribed the medication:Jaswinder Anne  Pharmacy: [facility] [place], [place] - [address]  Date medication is needed: ASAP/per pt 1 pill left        Action Taken: Message routed to:  [facility] pcc

## 2019-11-20 NOTE — TELEPHONE ENCOUNTER
Pharmacy called and they confirmed that they received the 11-7-19 order and will get a refill ready for the patient at this time.    The patient was called with this information.      Kathleen M Doege RN

## 2019-12-03 ENCOUNTER — PRE VISIT (OUTPATIENT)
Dept: UROLOGY | Facility: CLINIC | Age: 64
End: 2019-12-03

## 2019-12-03 NOTE — TELEPHONE ENCOUNTER
Chief Complaint : surgery consult / patient has questions    Records/Orders: NA    Pt Contacted: no    At Rooming: normal

## 2019-12-16 ENCOUNTER — ANESTHESIA EVENT (OUTPATIENT)
Dept: SURGERY | Facility: CLINIC | Age: 64
End: 2019-12-16

## 2019-12-16 ENCOUNTER — OFFICE VISIT (OUTPATIENT)
Dept: SURGERY | Facility: CLINIC | Age: 64
End: 2019-12-16
Payer: COMMERCIAL

## 2019-12-16 ENCOUNTER — OFFICE VISIT (OUTPATIENT)
Dept: UROLOGY | Facility: CLINIC | Age: 64
End: 2019-12-16
Payer: COMMERCIAL

## 2019-12-16 VITALS
TEMPERATURE: 97.8 F | WEIGHT: 185.9 LBS | RESPIRATION RATE: 14 BRPM | SYSTOLIC BLOOD PRESSURE: 124 MMHG | HEIGHT: 67 IN | BODY MASS INDEX: 29.18 KG/M2 | HEART RATE: 58 BPM | DIASTOLIC BLOOD PRESSURE: 71 MMHG | OXYGEN SATURATION: 96 %

## 2019-12-16 VITALS
BODY MASS INDEX: 28.65 KG/M2 | SYSTOLIC BLOOD PRESSURE: 147 MMHG | WEIGHT: 182.54 LBS | HEIGHT: 67 IN | HEART RATE: 54 BPM | DIASTOLIC BLOOD PRESSURE: 77 MMHG

## 2019-12-16 DIAGNOSIS — Z01.818 PRE-OP EXAMINATION: Primary | ICD-10-CM

## 2019-12-16 DIAGNOSIS — C61 PROSTATE CANCER (H): ICD-10-CM

## 2019-12-16 DIAGNOSIS — C61 PROSTATE CANCER (H): Primary | ICD-10-CM

## 2019-12-16 DIAGNOSIS — N39.0 URINARY TRACT INFECTION WITHOUT HEMATURIA, SITE UNSPECIFIED: ICD-10-CM

## 2019-12-16 DIAGNOSIS — R97.20 ELEVATED PROSTATE SPECIFIC ANTIGEN (PSA): ICD-10-CM

## 2019-12-16 DIAGNOSIS — Z01.818 PRE-OP EXAM: ICD-10-CM

## 2019-12-16 LAB
ALBUMIN SERPL-MCNC: 3.8 G/DL (ref 3.4–5)
ALBUMIN UR-MCNC: 100 MG/DL
ALP SERPL-CCNC: 174 U/L (ref 40–150)
ALT SERPL W P-5'-P-CCNC: 22 U/L (ref 0–70)
ANION GAP SERPL CALCULATED.3IONS-SCNC: 4 MMOL/L (ref 3–14)
APPEARANCE UR: CLEAR
AST SERPL W P-5'-P-CCNC: 11 U/L (ref 0–45)
BILIRUB SERPL-MCNC: 0.7 MG/DL (ref 0.2–1.3)
BILIRUB UR QL STRIP: NEGATIVE
BUN SERPL-MCNC: 23 MG/DL (ref 7–30)
CALCIUM SERPL-MCNC: 8.4 MG/DL (ref 8.5–10.1)
CHLORIDE SERPL-SCNC: 107 MMOL/L (ref 94–109)
CO2 SERPL-SCNC: 27 MMOL/L (ref 20–32)
COLOR UR AUTO: YELLOW
CREAT SERPL-MCNC: 0.8 MG/DL (ref 0.66–1.25)
ERYTHROCYTE [DISTWIDTH] IN BLOOD BY AUTOMATED COUNT: 12.5 % (ref 10–15)
GFR SERPL CREATININE-BSD FRML MDRD: >90 ML/MIN/{1.73_M2}
GLUCOSE SERPL-MCNC: 239 MG/DL (ref 70–99)
GLUCOSE UR STRIP-MCNC: NEGATIVE MG/DL
HCT VFR BLD AUTO: 36.7 % (ref 40–53)
HGB BLD-MCNC: 12.2 G/DL (ref 13.3–17.7)
HGB UR QL STRIP: ABNORMAL
KETONES UR STRIP-MCNC: NEGATIVE MG/DL
LEUKOCYTE ESTERASE UR QL STRIP: NEGATIVE
MCH RBC QN AUTO: 28.8 PG (ref 26.5–33)
MCHC RBC AUTO-ENTMCNC: 33.2 G/DL (ref 31.5–36.5)
MCV RBC AUTO: 87 FL (ref 78–100)
MUCOUS THREADS #/AREA URNS LPF: PRESENT /LPF
NITRATE UR QL: NEGATIVE
PH UR STRIP: 5 PH (ref 5–7)
PLATELET # BLD AUTO: 167 10E9/L (ref 150–450)
POTASSIUM SERPL-SCNC: 4.2 MMOL/L (ref 3.4–5.3)
PROT SERPL-MCNC: 7 G/DL (ref 6.8–8.8)
RBC # BLD AUTO: 4.24 10E12/L (ref 4.4–5.9)
RBC #/AREA URNS AUTO: 4 /HPF (ref 0–2)
SODIUM SERPL-SCNC: 139 MMOL/L (ref 133–144)
SOURCE: ABNORMAL
SP GR UR STRIP: 1.02 (ref 1–1.03)
SQUAMOUS #/AREA URNS AUTO: <1 /HPF (ref 0–1)
UROBILINOGEN UR STRIP-MCNC: 0 MG/DL (ref 0–2)
WBC # BLD AUTO: 4.7 10E9/L (ref 4–11)
WBC #/AREA URNS AUTO: 1 /HPF (ref 0–5)

## 2019-12-16 SDOH — HEALTH STABILITY: MENTAL HEALTH: HOW OFTEN DO YOU HAVE A DRINK CONTAINING ALCOHOL?: NEVER

## 2019-12-16 ASSESSMENT — ENCOUNTER SYMPTOMS
SEIZURES: 0
DYSRHYTHMIAS: 1

## 2019-12-16 ASSESSMENT — PAIN SCALES - GENERAL: PAINLEVEL: NO PAIN (0)

## 2019-12-16 ASSESSMENT — MIFFLIN-ST. JEOR
SCORE: 1566.63
SCORE: 1581.87

## 2019-12-16 ASSESSMENT — COPD QUESTIONNAIRES: COPD: 0

## 2019-12-16 ASSESSMENT — LIFESTYLE VARIABLES: TOBACCO_USE: 1

## 2019-12-16 NOTE — PATIENT INSTRUCTIONS
Preparing for Your Surgery      Name:  Loy Limon   MRN:  [id number]   :  1955   Today's Date:  2019     Arriving for surgery:  Surgery date:  1/3/20  Arrival time:  9:30 am  Please come to:     [facility] [address]  [address] [place]  [address]    - parking is available in front of [facility] from 5:15AM to 8:00PM. If you prefer, park your car in the Green Lot.    -Proceed to the 3rd floor, check in at the Adult Surgery Waiting Lounge. [phone]    If an escort is needed stop at the Information Desk in the lobby. Inform the information person that you are here for surgery. An escort to the Adult Surgery Waiting Lounge will be provided.    What can I eat or drink?  -  You may have solid food or milk products until 8 hours prior to your surgery (4:00 am).  -  You may have water, apple juice or 7up/Sprite until 2 hours prior to your surgery (10:00 am).    Which medicines can I take?  Stop Aspirin, vitamins and supplements one week prior to surgery.  Hold Ibuprofen for 24 hours and/or Naproxen for 48 hours prior to surgery.   -  Do NOT take these medications in the morning, the day of surgery:  Metformin (Glucophage)    -  Please take these medications the day of surgery:  Amlodipine (Norvasc) Metoprolol   Gabapentin (Neurontin) Tacrolimus    How do I prepare myself?  -  Take two showers: one the night before surgery; and one the morning of surgery.         Use Scrubcare or Hibiclens to wash from neck down, leave soap on your skin for up to one minute.  Do not get soap in your eyes or ears.  You may use your own shampoo and conditioner; no other hair products.   -  Do NOT use lotion, powder, deodorant, or antiperspirant the day of your surgery.  -  Do NOT wear any jewelry.  -  Do not bring your own medications to the hospital.  -  Bring your ID and insurance card.    Questions or Concerns:  -If you are scheduled on the East or West  campus and have questions or concerns regarding the day of surgery, please call Preadmission Nursing at [phone].     -For questions after surgery please call your surgeons office.     AFTER YOUR SURGERY  Breathing exercises   Breathing exercises help you recover faster. Take deep breaths and let the air out slowly. This will:     Help you wake up after surgery.    Help prevent complications like pneumonia.  Preventing complications will help you go home sooner.   We may give you a breathing device (incentive spirometer) to encourage you to breathe deeply.   Nausea and vomiting   You may feel sick to your stomach after surgery; if so, let your nurse know.    Pain control:  After surgery, you may have pain. Our goal is to help you manage your pain. Pain medicine will help you feel comfortable enough to do activities that will help you heal.  These activities may include breathing exercises, walking and physical therapy.   To help your health care team treat your pain we will ask: 1) If you have pain  2) where it is located 3) describe your pain in your words  Methods of pain control include medications given by mouth, vein or by nerve block for some surgeries.  Sequential Compression Device (SCD) or Pneumo Boots:  You may need to wear SCD S on your legs or feet. These are wraps connected to a machine that pumps in air and releases it. The repeated pumping helps prevent blood clots from forming.

## 2019-12-16 NOTE — ANESTHESIA PREPROCEDURE EVALUATION
Anesthesia Pre-Procedure Evaluation    Patient: Loy Limon   MRN:     [id number] Gender:   male   Age:    64 year old :      1955        Preoperative Diagnosis: * No surgery found *        Past Medical History:   Diagnosis Date     Biliary stricture of transplanted liver (H) 2018     BPH (benign prostatic hyperplasia)      Cancer (H)     hepatocellualr carcinoma     Cirrhosis of liver (H) 2018     Diabetes (H)      Enlarged prostate      Hypothyroidism      Inguinal hernia     Repaired with mesh on 18     Liver lesion 2018     Liver transplanted (H) 2018     donor liver transplant     Portal vein thrombosis     on path explant     Postoperative atrial fibrillation (H) 2018     Prostate cancer (H)      TB lung, latent     Treated       Past Surgical History:   Procedure Laterality Date     APPENDECTOMY       COLONOSCOPY           ENDOSCOPIC RETROGRADE CHOLANGIOPANCREATOGRAM N/A 2018    Procedure: ENDOSCOPIC RETROGRADE CHOLANGIOPANCREATOGRAM, with Billary Sphincterotomy and Billary Stent Placement;  Surgeon: Omero Lawler MD;  Location: [facility] OR     ENDOSCOPIC RETROGRADE CHOLANGIOPANCREATOGRAM  2019    Procedure: COMBINED ENDOSCOPIC RETROGRADE CHOLANGIOPANCREATOGRAPHY, Bile Duct Stent Exchange;  Surgeon: Omero Lawler MD;  Location: [facility] OR     ENDOSCOPIC RETROGRADE CHOLANGIOPANCREATOGRAM N/A 2019    Procedure: ENDOSCOPIC RETROGRADE CHOLANGIOPANCREATOGRAPHY, WITH BILIARY STENT REMOVAL AND STONE EXTRACTION;  Surgeon: Omero Lawler MD;  Location: [facility] OR     HERNIORRHAPHY INGUINAL  2018    Procedure: Herniorrhaphy inguinal;  Surgeon: Emil Echevarria MD;  Location: [facility] OR     IR LIVER BIOPSY PERCUTANEOUS  2018     TRANSPLANT LIVER RECIPIENT  DONOR N/A 2018    Procedure: TRANSPLANT LIVER RECIPIENT  DONOR;  Surgeon: Emil Echevarria MD;  Location: [facility] OR          Anesthesia Evaluation      . Pt has had prior anesthetic. Type: General    No history of anesthetic complications          ROS/MED HX    ENT/Pulmonary:  - neg pulmonary ROS   (+)tobacco use (4 year pack history, quit in 2018), Past use 0.2 packs/day  , . .   (-) asthma and COPD   Neurologic:  - neg neurologic ROS   (+)neuropathy - feet,    (-) seizures, CVA and TIA   Cardiovascular:     (+) hypertension----. : . . . :. dysrhythmias (post-op) a-fib and a-flutter, valvular problems/murmurs aortic sclerosis :. Previous cardiac testing Echodate:12/25/18results:Global and regional left ventricular function is normal with an EF of 60-65%.Right ventricular function, chamber size, wall motion, and thickness are  normal.Severe left atrial enlargement is present.  The inferior vena cava is normal.  No pericardial effusion is present.date: results:ECG reviewed date:2/18/19 results:Sinus bradycardia, minimal voltage criteria for LVH, may be normal variant  Non specific T wave abnormality      Cath date: 7/25/18 results:IMPRESSION:  1.  Mild non-obstructive CAD involving the LAD.  2.  Total Agatston score 996 placing the patient in the 97th  percentile when compared to age and gender matched control group.  3.  Please review Radiology report for incidental noncardiac findings  that will follow separately.            METS/Exercise Tolerance:  >4 METS   Hematologic:     (+) Anemia, -     (-) History of Transfusion   Musculoskeletal:  - neg musculoskeletal ROS       GI/Hepatic: Comment: S/p liver transplant on 12/22/2018.     (+) liver disease (History of ETOH induced cirrhosis and history of portal HTN),      (-) GERD   Renal/Genitourinary:     (+) BPH,       Endo:     (+) type II DM Last HgA1c: 7.3 date: 9/4/19 Not using insulin .      Psychiatric:  - neg psychiatric ROS       Infectious Disease:  - neg infectious disease ROS (+) TB (Followed by ID and treated ),       Malignancy:   (+) Malignancy (hepatocellular CA) History of Other and Prostate  Prostate  CA Active status post, Other CA Remission status post Surgery         Other: Comment: immunosuppressed   (+) no H/O Chronic Pain,no other significant disability   - neg other ROS                     PHYSICAL EXAM:   Mental Status/Neuro: A/A/O; Age Appropriate   Airway: Facies: Feasible  Mallampati: I  Mouth/Opening: Full  TM distance: > 6 cm  Neck ROM: Full   Respiratory: Auscultation: CTAB     Resp. Rate: Normal     Resp. Effort: Normal      CV: Rhythm: Regular  Heart: Murmur  Edema: None  Pulses: Normal   Comments:                      LABS:  CBC:   Lab Results   Component Value Date    WBC 4.1 11/18/2019    WBC 4.3 11/06/2019    HGB 12.1 (L) 11/18/2019    HGB 12.6 (L) 11/06/2019    HCT 36.5 (L) 11/18/2019    HCT 37.8 (L) 11/06/2019     11/18/2019     11/06/2019     BMP:   Lab Results   Component Value Date     11/18/2019     11/06/2019    POTASSIUM 4.9 11/18/2019    POTASSIUM 4.8 11/06/2019    CHLORIDE 109 11/18/2019    CHLORIDE 108 11/06/2019    CO2 26 11/18/2019    CO2 26 11/06/2019    BUN 17 11/18/2019    BUN 21 11/06/2019    CR 0.72 11/18/2019    CR 0.87 11/06/2019     (H) 11/18/2019     (H) 11/06/2019     COAGS:   Lab Results   Component Value Date    PTT 40 (H) 12/22/2018    INR 1.14 12/31/2018    FIBR 220 12/23/2018     POC:   Lab Results   Component Value Date     (H) 04/29/2019     OTHER:   Lab Results   Component Value Date    PH 7.41 12/23/2018    LACT 0.8 12/23/2018    A1C 7.3 (H) 09/04/2019    YELENA 8.4 (L) 11/18/2019    PHOS 2.4 (L) 11/18/2019    MAG 2.0 11/18/2019    ALBUMIN 3.6 11/18/2019    PROTTOTAL 6.5 (L) 11/18/2019    ALT 19 11/18/2019    AST 8 11/18/2019    ALKPHOS 182 (H) 11/18/2019    BILITOTAL 0.7 11/18/2019    LIPASE 42 (L) 03/04/2019    AMYLASE 44 03/04/2019    TSH 7.66 (H) 10/16/2019    T4 0.91 10/16/2019        Preop Vitals    BP Readings from Last 3 Encounters:   12/16/19 (!) 147/77   10/23/19 (!) 165/80   10/16/19 (!) 143/77    Pulse  "Readings from Last 3 Encounters:   12/16/19 54   10/23/19 51   10/16/19 (!) 46      Resp Readings from Last 3 Encounters:   06/05/19 16   05/13/19 18   05/03/19 18    SpO2 Readings from Last 3 Encounters:   09/04/19 98%   06/26/19 97%   04/29/19 100%      Temp Readings from Last 1 Encounters:   06/26/19 97.9  F (36.6  C)    Ht Readings from Last 1 Encounters:   12/16/19 1.702 m (5' 7\")      Wt Readings from Last 1 Encounters:   12/16/19 82.8 kg (182 lb 8.7 oz)    Estimated body mass index is 28.59 kg/m  as calculated from the following:    Height as of an earlier encounter on 12/16/19: 1.702 m (5' 7\").    Weight as of an earlier encounter on 12/16/19: 82.8 kg (182 lb 8.7 oz).     LDA:  Pulmonary Artery Catheter Assessment - Single Lumen 12/22/18 0908 (Active)   Number of days: 359        JZG FV AN PLAN NO PONV RULE       PAC Discussion and Assessment    ASA Classification: 3  [place] [facility]  Anesthetic techniques and relevant risks discussed: GA  Invasive monitoring and risk discussed:   Types:   Possibility and Risk of blood transfusion discussed:   NPO instructions given:   Additional anesthetic preparation and risks discussed:   Needs early admission to pre-op area:   Other:     PAC Resident/NP Anesthesia Assessment:  Loy is a 64 year old man who is scheduled for robotic assisted laparoscopic radical prostatectomy, possible lymphadenectomy and possible open on 1/3/20 by Dr. Casiano in treatment of prostate cancer.  PAC referral for risk assessment and optimization for anesthesia with comorbid conditions of HTN, history of post op a fib/a flutter, former smoker, neuropathy, anemia, type 2 diabetes, s/p liver transplant, LUTS/BPH, history of latent TB:    Pre-operative considerations:  1.  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  Intermediate risk surgery with " 0.9% (RCRI #) risk of major adverse cardiac event. Patient had previous cardiac testing as above. Recent cath in 8/2018 without obstructive coronary artery disease, echo in 12/25/18 with mild aortic sclerosis, severe atrial enlargement and EF 60-65%. He has good METS and no symptoms. No further testing indicated.   ~ HTN - continue Norvasc and metoprolol. Hold  mg for 7 days prior to surgery.   ~ The patient developed a fib/a flutter post operatively after transplant. He had an EKG on 2/18/19 with sinus bradycardia. He denies any issues with chest pain, palpitations or shortness of breath.     2.  Pulm:  Airway feasible.  NILDA risk: Intermediate (male, HTN, age)  ~ Former smoker - quit in 2018, he smoked 1-2 cigarettes a day for 20 years. 4 year pack history. He denies any current respiratory symptoms.     3. Neuro: Neuropathy - Patient has the most issues in his feet. Continue gabapentin.     4. Heme: Anemia - the patient baseline hgb is 12. Will also get type and screen today.     5. Endo: Type 2 diabetes - immunotherapy induced - followed by endocrinology. Last seen on 10/16/19. A1c on 9/4/19 was 7.3. He is on Metformin and will hold DOS. He has a follow up appointment with endocrinology on 12/18/19 and would like to wait to A1c testing at that visit.   ~ Patient reports he doesn't have hypothyroidism - he tried taking a pill but this was stopped.     6. GI:  Risk of PONV score =2.  If > 2, anti-emetic intervention recommended.  ~ History of ETOH induced cirrhosis/portal HTN/HCC - s/p liver transplant on 12/22/18. He has had strictures in the past treated with ERCP. He last saw Dr. Echevarria on 3/11/19 and was doing well. He has a follow up appointment with gastroenterology, Dr. Carballo on 12/18/19.     7. : LUTS/BPH - continue alfuzosin  ~ Prostate cancer - procedure as above.     8. ID: History of latent TB - followed by ID and treated. Last seen on 3/15/19.       VTE risk: 3%    Patient is optimized  and is acceptable candidate for the proposed procedure.  No further diagnostic evaluation is needed.     Patient discussed with Dr Martins.      For further details of assessment, testing, and physical exam please see H and P completed on same date.    Yazmin Pizarro PA-C    ADDENDUM: 12/19/19  The patient saw gastroenterology who noted the upcoming surgery and quasi normal liver function tests with recommendation to continue on immunosuppression. He saw the diabetes education and A1c does not to appear to have been drawn. The patient's last A1c was 7.3 in 9/2019. It had improved from prior. Given the nature of the surgery that even without the A1c lab the patient is optimized for the procedure.           Mid-Level Provider/Resident: Yazmin Pizarro PA-C  Date: 12/16/19  Time:     Attending Anesthesiologist Anesthesia Assessment:        Anesthesiologist:   Date:   Time:   Pass/Fail:   Disposition:     PAC Pharmacist Assessment:        Pharmacist:   Date:   Time:    Yazmin Pizarro PA-C

## 2019-12-16 NOTE — PROGRESS NOTES
Service Date: 12/16/2019      REASON FOR VISIT TODAY:  Prostate cancer.      HISTORY OF PRESENT ILLNESS:  Mr. Limon is a 64-year-old Danish-speaking gentleman who has a history of a liver transplant who is followed in our clinic for recent diagnosis of intermediate-risk prostate cancer.  The patient has been counseled on various treatment options and has seen Radiation Oncology but comes in today to discuss surgery further.  In addition to the patient's liver transplant, he also has a hernia repair on the right side with mesh and also has a lower midline abdominal scar that the patient states is from an appendectomy where there was apparent infection in the abdomen.      PHYSICAL EXAMINATION:  On exam today, the patient's blood pressure is 147/77, pulse 54.  He is in no acute distress.      ASSESSMENT AND PLAN:  Over half of today's 50-minute visit was spent counseling the patient regarding his prostate cancer.  I again discussed with Mr. Limon that he does have options including surgery as well as radiation therapy, among others.  I discussed with the patient that while we would ideally like to be able to do this operation robotically that I was concerned there is a high likelihood we might need to do the procedure open given the patient's previous abdominal surgeries, and in particular, the appendectomy with the presumed infection in the abdomen very well may have led to significant adhesions.  We discussed that it is unclear to the degree that those are still an issue and that it would be our plan to try to place the laparoscope initially to be able to determine whether or not it would be possible to complete the procedure robotically.  If not, then we would convert the patient to an open procedure and continue with the operation.  We did  the patient that certainly in this case there would be a higher likelihood of needing a transfusion which is somewhat problematic given the patient has a history of  transplant.  We discussed again that surgery would have a risk of leaking urine and loss of erectile function, though the patient notes he already has erectile dysfunction at this time.  We also discussed that even if the patient has surgery that he may require radiation following surgery.  We again encouraged the patient to consider radiation as an option, but the patient states he does not trust that radiation will get rid of the cancer and therefore really wishes to move forward with surgery.  After discussion of the risks and benefits, the patient wishes to move forward and the patient is already scheduled for .  He has a preop assessment in the PAC Center today and we will go ahead and send a urine today in preparation for the procedure.  Otherwise, we will see the patient back in a couple weeks for prostatectomy.         TEE DAWN MD             D: 2019   T: 2019   MT: stew      Name:     DONNA BIGGS   MRN:      [id number]        Account:      [id number]   :      1955           Service Date: 2019      Document: [id number]

## 2019-12-16 NOTE — LETTER
12/16/2019       RE: Loy Limon  [address]  [facility] [address]     Dear Colleague,    Thank you for referring your patient, Loy Limon, to the [facility] AND INST FOR PROSTATE AND UROLOGIC CANCERS at [facility]. Please see a copy of my visit note below.          Service Date: 12/16/2019      REASON FOR VISIT TODAY:  Prostate cancer.      HISTORY OF PRESENT ILLNESS:  Mr. Limon is a 64-year-old Wolof-speaking gentleman who has a history of a liver transplant who is followed in our clinic for recent diagnosis of intermediate-risk prostate cancer.  The patient has been counseled on various treatment options and has seen Radiation Oncology but comes in today to discuss surgery further.  In addition to the patient's liver transplant, he also has a hernia repair on the right side with mesh and also has a lower midline abdominal scar that the patient states is from an appendectomy where there was apparent infection in the abdomen.      PHYSICAL EXAMINATION:  On exam today, the patient's blood pressure is 147/77, pulse 54.  He is in no acute distress.      ASSESSMENT AND PLAN:  Over half of today's 50-minute visit was spent counseling the patient regarding his prostate cancer.  I again discussed with Mr. Limon that he does have options including surgery as well as radiation therapy, among others.  I discussed with the patient that while we would ideally like to be able to do this operation robotically that I was concerned there is a high likelihood we might need to do the procedure open given the patient's previous abdominal surgeries, and in particular, the appendectomy with the presumed infection in the abdomen very well may have led to significant adhesions.  We discussed that it is unclear to the degree that those are still an issue and that it would be our plan to try to place the laparoscope initially to be able to determine whether or not it  would be possible to complete the procedure robotically.  If not, then we would convert the patient to an open procedure and continue with the operation.  We did  the patient that certainly in this case there would be a higher likelihood of needing a transfusion which is somewhat problematic given the patient has a history of transplant.  We discussed again that surgery would have a risk of leaking urine and loss of erectile function, though the patient notes he already has erectile dysfunction at this time.  We also discussed that even if the patient has surgery that he may require radiation following surgery.  We again encouraged the patient to consider radiation as an option, but the patient states he does not trust that radiation will get rid of the cancer and therefore really wishes to move forward with surgery.  After discussion of the risks and benefits, the patient wishes to move forward and the patient is already scheduled for .  He has a preop assessment in the PAC Center today and we will go ahead and send a urine today in preparation for the procedure.  Otherwise, we will see the patient back in a couple weeks for prostatectomy.         TEE DAWN MD             D: 2019   T: 2019   MT: stew      Name:     DONNA BIGGS   MRN:      [id number]        Account:      [id number]   :      1955           Service Date: 2019      Document: [id number]

## 2019-12-16 NOTE — H&P
Pre-Operative H & P     ADDENDUM: 12/19/19  The patient saw gastroenterology who noted the upcoming surgery and quasi normal liver function tests with recommendation to continue on immunosuppression. He saw the diabetes education and A1c does not to appear to have been drawn. The patient's last A1c was 7.3 in 9/2019. It had improved from prior. Given the nature of the surgery that even without the A1c lab the patient is optimized for the procedure.       CC:  Preoperative exam to assess for increased cardiopulmonary risk while undergoing surgery and anesthesia.    Date of Encounter: 12/16/2019  Primary Care Physician:  Jaswinder Anne     Reason for visit: pre operative examination, prostate cancer    HPI  Loy Limon is a 64 year old male who presents for pre-operative H & P in preparation for robotic assisted laparoscopic radical prostatectomy, possible lymphoidectomy, possible open with Dr. Casiano on 1/3/19 at [facility].     The patient is a 64 year old man who is a fair historian. He has a history of alcoholic induced cirrhosis who developed hepatocellular carcinoma and underwent a liver transplant on 12/22/18. As a part of his work up he was found to have BPH. He had LUTs symptoms which improved with alpha blockers. He had an elevated PSA and on follow up, MRI showed a lesion. He had a biopsy done on 7/26/19 and this came back as prostate cancer. He met with radiation and surgery to discuss his options. He is now scheduled for the procedure as above.     History is obtained from the patient with a professional  and chart review    Past Medical History  Past Medical History:   Diagnosis Date     Biliary stricture of transplanted liver (H) 12/28/2018     BPH (benign prostatic hyperplasia)      Cancer (H)     hepatocellualr carcinoma     Cirrhosis of liver (H) 5/8/2018     Diabetes (H)      Enlarged prostate      Hypothyroidism      Inguinal  "hernia     Repaired with mesh on 18     Liver lesion 2018     Liver transplanted (H) 2018     donor liver transplant     Portal vein thrombosis     on path explant     Postoperative atrial fibrillation (H) 2018     Prostate cancer (H)      TB lung, latent     Treated        Past Surgical History  Past Surgical History:   Procedure Laterality Date     APPENDECTOMY       COLONOSCOPY           ENDOSCOPIC RETROGRADE CHOLANGIOPANCREATOGRAM N/A 2018    Procedure: ENDOSCOPIC RETROGRADE CHOLANGIOPANCREATOGRAM, with Billary Sphincterotomy and Billary Stent Placement;  Surgeon: Omero Lawler MD;  Location: [facility] OR     ENDOSCOPIC RETROGRADE CHOLANGIOPANCREATOGRAM  2019    Procedure: COMBINED ENDOSCOPIC RETROGRADE CHOLANGIOPANCREATOGRAPHY, Bile Duct Stent Exchange;  Surgeon: Omero Lawler MD;  Location: [facility] OR     ENDOSCOPIC RETROGRADE CHOLANGIOPANCREATOGRAM N/A 2019    Procedure: ENDOSCOPIC RETROGRADE CHOLANGIOPANCREATOGRAPHY, WITH BILIARY STENT REMOVAL AND STONE EXTRACTION;  Surgeon: Omero Lawler MD;  Location: [facility] OR     HERNIORRHAPHY INGUINAL  2018    Procedure: Herniorrhaphy inguinal;  Surgeon: Emil Echevarria MD;  Location: [facility] OR     IR LIVER BIOPSY PERCUTANEOUS  2018     LAPAROTOMY EXPLORATORY      per the patient \"for infection in the LLQ\"      TRANSPLANT LIVER RECIPIENT  DONOR N/A 2018    Procedure: TRANSPLANT LIVER RECIPIENT  DONOR;  Surgeon: Emil Echevarria MD;  Location: [facility] OR       Hx of Blood transfusions/reactions: denies     Hx of abnormal bleeding or anti-platelet use:  mg - hold 7 days    Menstrual history: No LMP for male patient.:     Steroid use in the last year: patient denies - but isn't sure what he has been taking for his transplant medications other then tacrolimus    Personal or FH with difficulty with Anesthesia:  denies    Prior to Admission Medications  Current Outpatient " Medications   Medication Sig Dispense Refill     amLODIPine (NORVASC) 10 MG tablet Take 1 tablet (10 mg) by mouth daily (Patient taking differently: Take 10 mg by mouth every morning ) 30 tablet 11     aspirin (ASA) 325 MG EC tablet Take 1 tablet (325 mg) by mouth daily (Patient taking differently: Take 325 mg by mouth every morning ) 30 tablet 11     blood glucose (NO BRAND SPECIFIED) lancets standard Use to test blood sugar 4 times daily or as directed. 200 each 11     blood glucose (ONETOUCH VERIO IQ) test strip Use to test blood sugar 4 times daily or as directed. 200 strip 11     gabapentin (NEURONTIN) 100 MG capsule Take 1 capsule (100 mg) by mouth 3 times daily Start 100 mg at bedtime for 1-2 weeks, then 2x daily for 1-2 weeks, then 3x daily thereafter. (Patient taking differently: Take 100 mg by mouth every morning Start 100 mg at bedtime for 1-2 weeks, then 2x daily for 1-2 weeks, then 3x daily thereafter.) 90 capsule 3     metFORMIN (GLUCOPHAGE-XR) 500 MG 24 hr tablet Take 2 tablets (1,000 mg) by mouth 2 times daily (with meals) (Patient taking differently: Take 1,000 mg by mouth every morning ) 120 tablet 3     metoprolol tartrate 75 MG TABS Take 75 mg by mouth 2 times daily (Patient taking differently: Take 75 mg by mouth every morning ) 60 tablet 11     multivitamin w/minerals (THERA-VIT-M) tablet Take 1 tablet by mouth daily (Patient taking differently: Take 1 tablet by mouth every morning ) 90 tablet 3     Sharps Container MISC 1 each daily 1 each 2     tacrolimus (GENERIC EQUIVALENT) 1 MG capsule Take 3 capsules (3 mg) by mouth every 12 hours (Patient taking differently: Take 3 mg by mouth every morning ) 180 capsule 11     glucose 40 % (400 mg/mL) GEL gel Take 15-30 g by mouth every 15 minutes as needed for low blood sugar 30 g 3       Allergies  No Known Allergies    Social History  Social History     Socioeconomic History     Marital status:      Spouse name: Not on file     Number of  children: Not on file     Years of education: Not on file     Highest education level: Not on file   Occupational History     Not on file   Social Needs     Financial resource strain: Not on file     Food insecurity:     Worry: Not on file     Inability: Not on file     Transportation needs:     Medical: Not on file     Non-medical: Not on file   Tobacco Use     Smoking status: Former Smoker     Packs/day: 0.03     Years: 20.00     Pack years: 0.60     Types: Cigarettes, Cigars     Start date: 1970     Last attempt to quit: 3/14/2018     Years since quittin.7     Smokeless tobacco: Never Used     Tobacco comment: Quit smoking 2018, one cigarette after dinner   Substance and Sexual Activity     Alcohol use: No     Frequency: Never     Comment: Quit drinking 2018     Drug use: No     Sexual activity: Not on file   Lifestyle     Physical activity:     Days per week: Not on file     Minutes per session: Not on file     Stress: Not on file   Relationships     Social connections:     Talks on phone: Not on file     Gets together: Not on file     Attends Alevism service: Not on file     Active member of club or organization: Not on file     Attends meetings of clubs or organizations: Not on file     Relationship status: Not on file     Intimate partner violence:     Fear of current or ex partner: Not on file     Emotionally abused: Not on file     Physically abused: Not on file     Forced sexual activity: Not on file   Other Topics Concern     Parent/sibling w/ CABG, MI or angioplasty before 65F 55M? Not Asked   Social History Narrative    Born in [place], came to [place] years ago.     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cardboard, trimming meats       Family History  Family History   Problem Relation Age of Onset     Memory loss Mother      Liver Disease Brother      Skin Cancer No family hx of      Melanoma No family hx of        Review of Systems    ROS/MED HX    ENT/Pulmonary:  - neg pulmonary ROS    (+)tobacco use (4 year pack history, quit in 2018), Past use 0.2 packs/day  , . .   (-) asthma and COPD   Neurologic:  - neg neurologic ROS   (+)neuropathy - feet,    (-) seizures, CVA and TIA   Cardiovascular:     (+) hypertension----. : . . . :. dysrhythmias (post-op) a-fib and a-flutter, valvular problems/murmurs aortic sclerosis :. Previous cardiac testing Echodate:12/25/18results:Global and regional left ventricular function is normal with an EF of 60-65%.Right ventricular function, chamber size, wall motion, and thickness are  normal.Severe left atrial enlargement is present.  The inferior vena cava is normal.  No pericardial effusion is present.date: results:ECG reviewed date:2/18/19 results:Sinus bradycardia, minimal voltage criteria for LVH, may be normal variant  Non specific T wave abnormality      Cath date: 7/25/18 results:IMPRESSION:  1.  Mild non-obstructive CAD involving the LAD.  2.  Total Agatston score 996 placing the patient in the 97th  percentile when compared to age and gender matched control group.  3.  Please review Radiology report for incidental noncardiac findings  that will follow separately.            METS/Exercise Tolerance:  >4 METS   Hematologic:     (+) Anemia, -     (-) History of Transfusion   Musculoskeletal:  - neg musculoskeletal ROS       GI/Hepatic: Comment: S/p liver transplant on 12/22/2018.     (+) liver disease (History of ETOH induced cirrhosis and history of portal HTN),      (-) GERD   Renal/Genitourinary:     (+) BPH,       Endo:     (+) type II DM Last HgA1c: 7.3 date: 9/4/19 Not using insulin .      Psychiatric:  - neg psychiatric ROS       Infectious Disease:  - neg infectious disease ROS (+) TB (Followed by ID and treated ),       Malignancy:   (+) Malignancy (hepatocellular CA) History of Other and Prostate  Prostate CA Active status post, Other CA Remission status post Surgery         Other: Comment: immunosuppressed   (+) no H/O Chronic Pain,no other  "significant disability   - neg other ROS     The complete review of systems is negative other than noted in the HPI or here.   Temp: 97.8  F (36.6  C) Temp src: Oral BP: 124/71 Pulse: 58   Resp: 14 SpO2: 96 %         185 lbs 14.4 oz  5' 7\"   Body mass index is 29.12 kg/m .       Physical Exam  Constitutional: Awake, alert, cooperative, no apparent distress, and appears stated age.  Eyes: Pupils equal, round and reactive to light, extra ocular muscles intact, sclera clear, conjunctiva normal.  HENT: Normocephalic, oral pharynx with moist mucus membranes, good dentition. No goiter appreciated.   Respiratory: Clear to auscultation bilaterally, no crackles or wheezing.  Cardiovascular: Regular rate and rhythm, normal S1 and S2, and soft grade II systolic murmur noted at RSB.  Carotids +2, no bruits. No edema. Palpable pulses to radial  DP and PT arteries.   GI: Normal bowel sounds, soft, non-distended, non-tender, no masses palpated, no hepatosplenomegaly.  Surgical scars: well healed  Lymph/Hematologic: No cervical lymphadenopathy and no supraclavicular lymphadenopathy.  Genitourinary:  defer  Skin: Warm and dry.  No rashes at anticipated surgical site.   Musculoskeletal: Full ROM of neck. There is no redness, warmth, or swelling of the joints. Gross motor strength is normal.    Neurologic: Awake, alert, oriented to name, place and time. Cranial nerves II-XII are grossly intact. Gait is normal.   Neuropsychiatric: Calm, cooperative. Normal affect.     Labs: (personally reviewed)  Results for DONNA BIGGS (MRN [id number]) as of 12/16/2019 16:11   Ref. Range 12/16/2019 14:59   Sodium Latest Ref Range: 133 - 144 mmol/L 139   Potassium Latest Ref Range: 3.4 - 5.3 mmol/L 4.2   Chloride Latest Ref Range: 94 - 109 mmol/L 107   Carbon Dioxide Latest Ref Range: 20 - 32 mmol/L 27   Urea Nitrogen Latest Ref Range: 7 - 30 mg/dL 23   Creatinine Latest Ref Range: 0.66 - 1.25 mg/dL 0.80   GFR Estimate Latest Ref Range: >60 " mL/min/1.73_m2 >90   GFR Estimate If Black Latest Ref Range: >60 mL/min/1.73_m2 >90   Calcium Latest Ref Range: 8.5 - 10.1 mg/dL 8.4 (L)   Anion Gap Latest Ref Range: 3 - 14 mmol/L 4   Albumin Latest Ref Range: 3.4 - 5.0 g/dL 3.8   Protein Total Latest Ref Range: 6.8 - 8.8 g/dL 7.0   Bilirubin Total Latest Ref Range: 0.2 - 1.3 mg/dL 0.7   Alkaline Phosphatase Latest Ref Range: 40 - 150 U/L 174 (H)   ALT Latest Ref Range: 0 - 70 U/L 22   AST Latest Ref Range: 0 - 45 U/L 11   Glucose Latest Ref Range: 70 - 99 mg/dL 239 (H)   WBC Latest Ref Range: 4.0 - 11.0 10e9/L 4.7   Hemoglobin Latest Ref Range: 13.3 - 17.7 g/dL 12.2 (L)   Hematocrit Latest Ref Range: 40.0 - 53.0 % 36.7 (L)   Platelet Count Latest Ref Range: 150 - 450 10e9/L 167   RBC Count Latest Ref Range: 4.4 - 5.9 10e12/L 4.24 (L)   MCV Latest Ref Range: 78 - 100 fl 87   MCH Latest Ref Range: 26.5 - 33.0 pg 28.8   MCHC Latest Ref Range: 31.5 - 36.5 g/dL 33.2   RDW Latest Ref Range: 10.0 - 15.0 % 12.5       EKG 2/18/19  Sinus bradycardia, minimal voltage criteria for LVH, may be normal variant  Non specific T wave abnormality    Echo 12/21/18  Interpretation Summary  Global and regional left ventricular function is normal with an EF of 60-65%.  Right ventricular function, chamber size, wall motion, and thickness are  normal.  Severe left atrial enlargement is present.  The inferior vena cava is normal.  No pericardial effusion is present.    CTA angiogram coronary artery 7/25/18  IMPRESSION:  1.  Mild non-obstructive CAD involving the LAD.  2.  Total Agatston score 996 placing the patient in the 97th  percentile when compared to age and gender matched control group.  3.  Please review Radiology report for incidental noncardiac findings  that will follow separately.      MRI PROSTATE: 4/26/2019 2:37 PM  IMPRESSION:  1. Based on the most suspicious abnormality, this exam is  characterized as PIRADS 5 - Clinically significant cancer is highly  likely to be  present.?The most suspicious abnormality is located at  the posterior left apex peripheral zone and there is high suspicion of  extraprostatic extension.  2. No suspicious adenopathy or evidence of pelvic metastases.    The patient's records and results personally reviewed by this provider.     Outside records reviewed from: care everywhere     ASSESSMENT and PLAN  Loy is a 64 year old man who is scheduled for robotic assisted laparoscopic radical prostatectomy, possible lymphadenectomy and possible open on 1/3/20 by Dr. Casiano in treatment of prostate cancer.  PAC referral for risk assessment and optimization for anesthesia with comorbid conditions of HTN, history of post op a fib/a flutter, former smoker, neuropathy, anemia, type 2 diabetes, s/p liver transplant, LUTS/BPH, history of latent TB:    Pre-operative considerations:  1.  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  Intermediate risk surgery with 0.9% (RCRI #) risk of major adverse cardiac event. Patient had previous cardiac testing as above. Recent cath in 8/2018 without obstructive coronary artery disease, echo in 12/25/18 with mild aortic sclerosis, severe atrial enlargement and EF 60-65%. He has good METS and no symptoms. No further testing indicated.   ~ HTN - continue Norvasc and metoprolol. Hold  mg for 7 days prior to surgery.   ~ The patient developed a fib/a flutter post operatively after transplant. He had an EKG on 2/18/19 with sinus bradycardia. He denies any issues with chest pain, palpitations or shortness of breath.     2.  Pulm:  Airway feasible.  NILDA risk: Intermediate (male, HTN, age)  ~ Former smoker - quit in 2018, he smoked 1-2 cigarettes a day for 20 years. 4 year pack history. He denies any current respiratory symptoms.     3. Neuro: Neuropathy - Patient has the most issues in his feet.  Continue gabapentin.     4. Heme: Anemia - the patient baseline hgb is 12. Will also get type and screen today.     5. Endo: Type 2 diabetes - immunotherapy induced - followed by endocrinology. Last seen on 10/16/19. A1c on 9/4/19 was 7.3. He is on Metformin and will hold DOS. He has a follow up appointment with endocrinology on 12/18/19 and would like to wait to A1c testing at that visit.   ~ Patient reports he doesn't have hypothyroidism - he tried taking a pill but this was stopped.     6. GI:  Risk of PONV score =2.  If > 2, anti-emetic intervention recommended.  ~ History of ETOH induced cirrhosis/portal HTN/HCC - s/p liver transplant on 12/22/18. He has had strictures in the past treated with ERCP. He last saw Dr. Echevarria on 3/11/19 and was doing well. He has a follow up appointment with gastroenterology, Dr. Carballo on 12/18/19.     7. : LUTS/BPH - continue alfuzosin  ~ Prostate cancer - procedure as above.     8. ID: History of latent TB - followed by ID and treated. Last seen on 3/15/19.       VTE risk: 3%    Patient was discussed with Dr Martins.    The patient is optimized for their procedure. AVS with information on surgery time/arrival time, meds and NPO status given by nursing staff.        Yazmin Pizarro PA-C  Preoperative Assessment Center  [facility]  Phone: [phone]  Fax: [phone]

## 2019-12-17 ENCOUNTER — PRE VISIT (OUTPATIENT)
Dept: GASTROENTEROLOGY | Facility: CLINIC | Age: 64
End: 2019-12-17

## 2019-12-17 LAB
BACTERIA SPEC CULT: NORMAL
BACTERIA SPEC CULT: NORMAL
Lab: NORMAL
SPECIMEN SOURCE: NORMAL

## 2019-12-17 NOTE — PROGRESS NOTES
Appointment reminded completed by  services.     Joyce Ruvalcaba, [facility] CMA  12/17/2019 4:12 PM

## 2019-12-18 ENCOUNTER — ALLIED HEALTH/NURSE VISIT (OUTPATIENT)
Dept: EDUCATION SERVICES | Facility: CLINIC | Age: 64
End: 2019-12-18
Payer: COMMERCIAL

## 2019-12-18 ENCOUNTER — OFFICE VISIT (OUTPATIENT)
Dept: GASTROENTEROLOGY | Facility: CLINIC | Age: 64
End: 2019-12-18
Attending: INTERNAL MEDICINE
Payer: COMMERCIAL

## 2019-12-18 VITALS
SYSTOLIC BLOOD PRESSURE: 156 MMHG | OXYGEN SATURATION: 98 % | BODY MASS INDEX: 29.05 KG/M2 | DIASTOLIC BLOOD PRESSURE: 67 MMHG | WEIGHT: 185.1 LBS | HEART RATE: 51 BPM | HEIGHT: 67 IN

## 2019-12-18 DIAGNOSIS — E11.9 TYPE 2 DIABETES MELLITUS (H): Primary | ICD-10-CM

## 2019-12-18 DIAGNOSIS — Z94.4 LIVER TRANSPLANT RECIPIENT (H): ICD-10-CM

## 2019-12-18 DIAGNOSIS — Z94.4 LIVER TRANSPLANTED (H): ICD-10-CM

## 2019-12-18 DIAGNOSIS — C22.0 HCC (HEPATOCELLULAR CARCINOMA) (H): ICD-10-CM

## 2019-12-18 DIAGNOSIS — Z94.4 LIVER REPLACED BY TRANSPLANT (H): ICD-10-CM

## 2019-12-18 DIAGNOSIS — K70.30: Primary | ICD-10-CM

## 2019-12-18 LAB
ALBUMIN SERPL-MCNC: 3.6 G/DL (ref 3.4–5)
ALP SERPL-CCNC: 178 U/L (ref 40–150)
ALT SERPL W P-5'-P-CCNC: 23 U/L (ref 0–70)
ANION GAP SERPL CALCULATED.3IONS-SCNC: 3 MMOL/L (ref 3–14)
AST SERPL W P-5'-P-CCNC: 9 U/L (ref 0–45)
BILIRUB DIRECT SERPL-MCNC: 0.2 MG/DL (ref 0–0.2)
BILIRUB SERPL-MCNC: 0.8 MG/DL (ref 0.2–1.3)
BUN SERPL-MCNC: 25 MG/DL (ref 7–30)
CALCIUM SERPL-MCNC: 8.4 MG/DL (ref 8.5–10.1)
CHLORIDE SERPL-SCNC: 110 MMOL/L (ref 94–109)
CO2 SERPL-SCNC: 27 MMOL/L (ref 20–32)
CREAT SERPL-MCNC: 0.77 MG/DL (ref 0.66–1.25)
ERYTHROCYTE [DISTWIDTH] IN BLOOD BY AUTOMATED COUNT: 12.3 % (ref 10–15)
GFR SERPL CREATININE-BSD FRML MDRD: >90 ML/MIN/{1.73_M2}
GLUCOSE SERPL-MCNC: 166 MG/DL (ref 70–99)
HCT VFR BLD AUTO: 38.5 % (ref 40–53)
HGB BLD-MCNC: 12.7 G/DL (ref 13.3–17.7)
MAGNESIUM SERPL-MCNC: 2 MG/DL (ref 1.6–2.3)
MCH RBC QN AUTO: 28.9 PG (ref 26.5–33)
MCHC RBC AUTO-ENTMCNC: 33 G/DL (ref 31.5–36.5)
MCV RBC AUTO: 88 FL (ref 78–100)
PHOSPHATE SERPL-MCNC: 2.7 MG/DL (ref 2.5–4.5)
PLATELET # BLD AUTO: 171 10E9/L (ref 150–450)
POTASSIUM SERPL-SCNC: 4.5 MMOL/L (ref 3.4–5.3)
PROT SERPL-MCNC: 7 G/DL (ref 6.8–8.8)
RBC # BLD AUTO: 4.39 10E12/L (ref 4.4–5.9)
SODIUM SERPL-SCNC: 140 MMOL/L (ref 133–144)
TACROLIMUS BLD-MCNC: 7.1 UG/L (ref 5–15)
TME LAST DOSE: NORMAL H
WBC # BLD AUTO: 4.8 10E9/L (ref 4–11)

## 2019-12-18 PROCEDURE — 83735 ASSAY OF MAGNESIUM: CPT | Performed by: TRANSPLANT SURGERY

## 2019-12-18 PROCEDURE — 80048 BASIC METABOLIC PNL TOTAL CA: CPT | Performed by: TRANSPLANT SURGERY

## 2019-12-18 PROCEDURE — 80076 HEPATIC FUNCTION PANEL: CPT | Performed by: TRANSPLANT SURGERY

## 2019-12-18 PROCEDURE — T1013 SIGN LANG/ORAL INTERPRETER: HCPCS | Mod: U3

## 2019-12-18 PROCEDURE — 80307 DRUG TEST PRSMV CHEM ANLYZR: CPT | Performed by: TRANSPLANT SURGERY

## 2019-12-18 PROCEDURE — 84100 ASSAY OF PHOSPHORUS: CPT | Performed by: TRANSPLANT SURGERY

## 2019-12-18 PROCEDURE — 36415 COLL VENOUS BLD VENIPUNCTURE: CPT | Performed by: TRANSPLANT SURGERY

## 2019-12-18 PROCEDURE — G0463 HOSPITAL OUTPT CLINIC VISIT: HCPCS | Mod: ZF

## 2019-12-18 PROCEDURE — 80197 ASSAY OF TACROLIMUS: CPT | Performed by: TRANSPLANT SURGERY

## 2019-12-18 PROCEDURE — 85027 COMPLETE CBC AUTOMATED: CPT | Performed by: TRANSPLANT SURGERY

## 2019-12-18 ASSESSMENT — PAIN SCALES - GENERAL: PAINLEVEL: NO PAIN (0)

## 2019-12-18 ASSESSMENT — MIFFLIN-ST. JEOR: SCORE: 1578.24

## 2019-12-18 NOTE — PROGRESS NOTES
[facility]    Hepatology follow-up    CHIEF COMPLAINT/REASON FOR THE VISIT:  Status post liver transplantation.       :  Mr. 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  He had the diagnosis of hepatocellular carcinoma made in 2018.  He got evaluated and eventually got listed.  He received a liver on 2018.  Prior to the transplantation, he had microwave ablation.  His explant showed that there was a viable tumor and he does not show any signs of recurrences so far.  Mr. Limon, who was seen and followed by Urology for urinary issues, and in fact he is currently diagnosed with intermediate risk prostate cancer and he is scheduled to have surgery soon.      Symptom-wise, he has some right upper quadrant abdominal pain. This was seen on imaging and it appeared that this might have been related with his surgery. It showed the thickness which he feels showed that he had in the anterior abdominal wall muscle something they thought might be related with evolving post-surgical changes and they thought that this is not drainable. His appetite is good. He denies any nausea or vomiting. He is moving his bowels.  Otherwise, he is following with Urology and is going to have surgery on 2019.     Medical hx Surgical hx   Past Medical History:   Diagnosis Date     Anemia      Biliary stricture of transplanted liver (H) 2018     BPH (benign prostatic hyperplasia)      Cancer (H)     hepatocellualr carcinoma     Cirrhosis of liver (H) 2018     Diabetes (H)      Enlarged prostate      Hypothyroidism      Inguinal hernia     Repaired with mesh on 18     Liver lesion 2018     Liver transplanted (H) 2018     donor liver transplant     Portal vein thrombosis     on path explant     Postoperative atrial fibrillation (H)  "2018     Prostate cancer (H)      TB lung, latent     Treated       Past Surgical History:   Procedure Laterality Date     APPENDECTOMY       COLONOSCOPY      2018     ENDOSCOPIC RETROGRADE CHOLANGIOPANCREATOGRAM N/A 2018    Procedure: ENDOSCOPIC RETROGRADE CHOLANGIOPANCREATOGRAM, with Billary Sphincterotomy and Billary Stent Placement;  Surgeon: Omero Lawler MD;  Location: [facility]     ENDOSCOPIC RETROGRADE CHOLANGIOPANCREATOGRAM  2019    Procedure: COMBINED ENDOSCOPIC RETROGRADE CHOLANGIOPANCREATOGRAPHY, Bile Duct Stent Exchange;  Surgeon: Omero Lawler MD;  Location: [facility] OR     ENDOSCOPIC RETROGRADE CHOLANGIOPANCREATOGRAM N/A 2019    Procedure: ENDOSCOPIC RETROGRADE CHOLANGIOPANCREATOGRAPHY, WITH BILIARY STENT REMOVAL AND STONE EXTRACTION;  Surgeon: Omero Lawler MD;  Location: [facility] OR     HERNIORRHAPHY INGUINAL  2018    Procedure: Herniorrhaphy inguinal;  Surgeon: Emil Echevarria MD;  Location: [facility]     IR LIVER BIOPSY PERCUTANEOUS  2018     LAPAROTOMY EXPLORATORY      per the patient \"for infection in the LLQ\"      TRANSPLANT LIVER RECIPIENT  DONOR N/A 2018    Procedure: TRANSPLANT LIVER RECIPIENT  DONOR;  Surgeon: Emil Echevarria MD;  Location: [facility]          Medications  Prior to Admission medications    Medication Sig Start Date End Date Taking? Authorizing Provider   amLODIPine (NORVASC) 10 MG tablet Take 1 tablet (10 mg) by mouth daily  Patient taking differently: Take 10 mg by mouth every morning  19  Yes Inez Baker APRN CNP   aspirin (ASA) 325 MG EC tablet Take 1 tablet (325 mg) by mouth daily  Patient taking differently: Take 325 mg by mouth every morning  19  Yes Emil Echevarria MD   blood glucose (NO BRAND SPECIFIED) lancets standard Use to test blood sugar 4 times daily or as directed. 19 Yes Inez Baker APRN CNP   blood glucose (ONETOUCH VERIO IQ) test strip Use " "to test blood sugar 4 times daily or as directed. 1/7/19 1/7/20 Yes Inez Baker APRN CNP   gabapentin (NEURONTIN) 100 MG capsule Take 1 capsule (100 mg) by mouth 3 times daily Start 100 mg at bedtime for 1-2 weeks, then 2x daily for 1-2 weeks, then 3x daily thereafter.  Patient taking differently: Take 100 mg by mouth every morning Start 100 mg at bedtime for 1-2 weeks, then 2x daily for 1-2 weeks, then 3x daily thereafter. 5/28/19  Yes Rolly Smith MD   glucose 40 % (400 mg/mL) GEL gel Take 15-30 g by mouth every 15 minutes as needed for low blood sugar 1/7/19  Yes Inez Baker APRN CNP   metFORMIN (GLUCOPHAGE-XR) 500 MG 24 hr tablet Take 2 tablets (1,000 mg) by mouth 2 times daily (with meals)  Patient taking differently: Take 1,000 mg by mouth every morning  11/7/19  Yes Jaswinder Anne MD   metoprolol tartrate 75 MG TABS Take 75 mg by mouth 2 times daily  Patient taking differently: Take 75 mg by mouth every morning  1/7/19  Yes Inez Baker APRN CNP   multivitamin w/minerals (THERA-VIT-M) tablet Take 1 tablet by mouth daily  Patient taking differently: Take 1 tablet by mouth every morning  1/24/19  Yes Emil Echevarria MD   Sharps Container MISC 1 each daily 1/7/19  Yes Inez Baker APRN CNP   tacrolimus (GENERIC EQUIVALENT) 1 MG capsule Take 3 capsules (3 mg) by mouth every 12 hours  Patient taking differently: Take 3 mg by mouth every morning  11/6/19  Yes Tamela Carballo MD       Allergies  No Known Allergies    Review of systems  A 10-point review of systems was negative    Examination  BP (!) 156/67   Pulse 51   Ht 1.702 m (5' 7\")   Wt 84 kg (185 lb 1.6 oz)   SpO2 98%   BMI 28.99 kg/m    Body mass index is 28.99 kg/m .    Gen- well, NAD, A+Ox3, normal color  Lym- no palpable LAD  CVS- RRR  RS- CTA  Abd-  Healed surgical scar. Mild protuberance near the umbilicus.  Extr- hands normal, no OSKAR  Skin- no rash or jaundice  Neuro- no " asterixis  Psych- normal mood    Laboratory  Lab Results   Component Value Date     12/18/2019    POTASSIUM 4.5 12/18/2019    CHLORIDE 110 12/18/2019    CO2 27 12/18/2019    BUN 25 12/18/2019    CR 0.77 12/18/2019       Lab Results   Component Value Date    BILITOTAL 0.8 12/18/2019    ALT 23 12/18/2019    AST 9 12/18/2019    ALKPHOS 178 12/18/2019       Lab Results   Component Value Date    ALBUMIN 3.6 12/18/2019    PROTTOTAL 7.0 12/18/2019        Lab Results   Component Value Date    WBC 4.8 12/18/2019    HGB 12.7 12/18/2019    MCV 88 12/18/2019     12/18/2019       Lab Results   Component Value Date    INR 1.14 12/31/2018       Radiology    ASSESSMENT AND PLAN:  Status post liver transplantation.  Mr. Limon has received a liver transplantation for hepatocellular carcinoma in the context of alcoholic cirrhosis.  He is doing quite well.  His labs done today show  quasi normal liver function tests with minimal elevation of the alkaline phosphatase at 178.  Otherwise, he is doing quite well.  He is scheduled for his intermediate risk prostate cancer surgery on 01/03/2019.  We advised him to follow the recommendations of the urologists and he is open to that.  He will continue otherwise his immunosuppression.  He will be seen here in 6-12 months and in the meantime, he will follow with his primary care physician and urologist.  He will need also to see Dermatology on a yearly basis.  Please note that he was seen in October it appears.        This was a 25-minute visit, of which more than 50% was spent in explaining to the patient what our plan of care was.  We answered all his questions.      cc:     Primary care physician       Tamela Carballo MD  Hepatology  [facility]

## 2019-12-18 NOTE — PROGRESS NOTES
"Diabetes Self-Management Education & Support  Diabetes Education Self Management & Training    SUBJECTIVE/OBJECTIVE:  Presents for: Follow-up  Accompanied by: Spouse,   Diabetes education in the past 24mo: Yes  Focus of Visit: Healthy Eating, Self-care behavioral goal setting, Assistance w/ making life changes  Diabetes type: Type 2  Other concerns:: Language barrier, English as a second language  Cultural Influences/Ethnic Background:  Mexican    Diabetes Symptoms & Complications     Patient Problem List and Family Medical History reviewed for relevant medical history, current medical status, and diabetes risk factors.    Vitals:  Wt 84.1 kg (185 lb 8 oz)   BMI 29.05 kg/m    Estimated body mass index is 29.05 kg/m  as calculated from the following:    Height as of an earlier encounter on 12/18/19: 1.702 m (5' 7\").    Weight as of this encounter: 84.1 kg (185 lb 8 oz).   Last 3 BP:   BP Readings from Last 3 Encounters:   12/18/19 (!) 156/67   12/16/19 124/71   12/16/19 (!) 147/77       History   Smoking Status     Former Smoker     Packs/day: 0.03     Years: 20.00     Types: Cigarettes, Cigars     Start date: 8/14/1970     Quit date: 3/14/2018   Smokeless Tobacco     Never Used     Comment: Quit smoking Feb 2018, one cigarette after dinner       Labs:  Lab Results   Component Value Date    A1C 7.3 09/04/2019     Lab Results   Component Value Date     12/18/2019     Lab Results   Component Value Date    LDL 71 10/23/2019     HDL Cholesterol   Date Value Ref Range Status   10/23/2019 38 (L) >39 mg/dL Final   ]  GFR Estimate   Date Value Ref Range Status   12/18/2019 >90 >60 mL/min/[1.73_m2] Final     Comment:     Non  GFR Calc  Starting 12/18/2018, serum creatinine based estimated GFR (eGFR) will be   calculated using the Chronic Kidney Disease Epidemiology Collaboration   (CKD-EPI) equation.       GFR Estimate If Black   Date Value Ref Range Status   12/18/2019 >90 >60 " mL/min/[1.73_m2] Final     Comment:      GFR Calc  Starting 12/18/2018, serum creatinine based estimated GFR (eGFR) will be   calculated using the Chronic Kidney Disease Epidemiology Collaboration   (CKD-EPI) equation.       Lab Results   Component Value Date    CR 0.77 12/18/2019     No results found for: MICROALBUMIN    Healthy Eating  Other: Reports is eating more veggies and less salt and sweets.    Most common meal (lunch and dinner) is 5-6 corn tortilla with vegetables (spinach, lettuce, cabbage, roseann, cauliflower etc), 8 ounces steak or fish or shrimp, 1+ cup beans, 1 avocado  Sometimes eggs  Couple times per month 2 flour tortilla with cheese and peppers, beans  He finishes his meal with two pieces of fruit.  For breakfast he is eating a 1/2 roll of ritz crackers.  Drinks water. He is not drinking sweetened drinks.     Being Active  Being Active Assessed Today: Yes  Exercise:: Currently not exercising  How intense was your typical exercise? : (Hasn't started at the  because it is too expensive for him. States that he doesn't like to walk outside in the cold weather, but feels he could go to the [facility] 1-2 times per week to walk. )    Monitoring  Monitoring Assessed Today: Yes  Did patient bring glucose meter to appointment? : Yes        Taking Medications  Diabetes Medication(s)     Biguanides       metFORMIN (GLUCOPHAGE-XR) 500 MG 24 hr tablet    Take 2 tablets (1,000 mg) by mouth 2 times daily (with meals)     Patient taking differently:  Take 1,000 mg by mouth every morning     Diabetic Other       glucose 40 % (400 mg/mL) GEL gel    Take 15-30 g by mouth every 15 minutes as needed for low blood sugar          Taking Medication Assessed Today: Yes  Current Treatments: Oral Agent (monotherapy)  Problems taking diabetes medications regularly?: No  Diabetes medication side effects?: No  Treatment Compliance: All of the time    Healthy Coping  Healthy Coping Assessed Today: Yes  Emotional  "response to diabetes: Ready to learn  Patient Activation Measure Survey Score:  No flowsheet data found.    ASSESSMENT:  Patient's diet is high in carbohydrate and meals regularly contain over 120 grams of carb.   He worries he will be hungry if he cuts his portions back but states \"I need to do it\".  I encouraged him to have three smaller meals daily and snacks as needed in between if he gets hungry.   We discussed that it is normal to feel hungry four hours or so after a meal.     Patient's most recent   Lab Results   Component Value Date    A1C 7.3 09/04/2019    is not meeting goal of <7.0    INTERVENTION:   Diabetes knowledge and skills assessment:     Patient is knowledgeable in diabetes management concepts related to: Healthy Eating    Patient needs further education on the following diabetes management concepts: Healthy Eating    Based on learning assessment above, most appropriate setting for further diabetes education would be: Individual setting.    Education provided today on:  AADE Self-Care Behaviors:  Healthy Eating: what foods contain carbohydrate and which foods do not, relationship between macronutrients and glucose, portion control, timing of meals and snacks, general healthy eating.  Discussed ideas for diet modification to help lower carb intake at meals.     Opportunities for ongoing education and support in diabetes-self management were discussed.    Pt verbalized understanding of concepts discussed and recommendations provided today.       PLAN:  See Patient Instructions for co-developed, patient-stated behavior change goals.  AVS printed and provided to patient today. See Follow-Up section for recommended follow-up.  Cut the carbs at meals in half. Aim for 5 carb portions or 75 grams of carbohydrate.   Stop the crackers in the morning and have a balanced meal instead. Eat three smaller meals per day.  Have snacks in between meals as needed. Fruit and vegetables make a good snack.   Time " Spent: 60 minutes  Encounter Type: Individual    Any diabetes medication dose changes were made via the CDE Protocol and Collaborative Practice Agreement with the patient's referring provider. A copy of this encounter was shared with the provider.

## 2019-12-18 NOTE — LETTER
12/18/2019       RE: Loy Limon  [address]  [facility] [place] [facility] [address]     Dear Colleague,    Thank you for referring your patient, Loy Limon, to the [facility] HEPATOLOGY at [facility]. Please see a copy of my visit note below.    [facility]    Hepatology follow-up    CHIEF COMPLAINT/REASON FOR THE VISIT:  Status post liver transplantation.       :  Mr. 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  He had the diagnosis of hepatocellular carcinoma made in 03/2018.  He got evaluated and eventually got listed.  He received a liver on 12/22/2018.  Prior to the transplantation, he had microwave ablation.  His explant showed that there was a viable tumor and he does not show any signs of recurrences so far.  Mr. Limon, who was seen and followed by Urology for urinary issues, and in fact he is currently diagnosed with intermediate risk prostate cancer and he is scheduled to have surgery soon.      Symptom-wise, he has some right upper quadrant abdominal pain. This was seen on imaging and it appeared that this might have been related with his surgery. It showed the thickness which he feels showed that he had in the anterior abdominal wall muscle something they thought might be related with evolving post-surgical changes and they thought that this is not drainable. His appetite is good. He denies any nausea or vomiting. He is moving his bowels.  Otherwise, he is following with Urology and is going to have surgery on 01/03/2019.     Medical hx Surgical hx   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/28/2018     BPH (benign prostatic hyperplasia)      Cancer (H)     hepatocellualr carcinoma     Cirrhosis of liver (H) 5/8/2018     Diabetes (H)   "    Enlarged prostate      Hypothyroidism      Inguinal hernia     Repaired with mesh on 18     Liver lesion 2018     Liver transplanted (H) 2018     donor liver transplant     Portal vein thrombosis     on path explant     Postoperative atrial fibrillation (H) 2018     Prostate cancer (H)      TB lung, latent     Treated       Past Surgical History:   Procedure Laterality Date     APPENDECTOMY       COLONOSCOPY           ENDOSCOPIC RETROGRADE CHOLANGIOPANCREATOGRAM N/A 2018    Procedure: ENDOSCOPIC RETROGRADE CHOLANGIOPANCREATOGRAM, with Billary Sphincterotomy and Billary Stent Placement;  Surgeon: Omero Lawler MD;  Location: [facility]     ENDOSCOPIC RETROGRADE CHOLANGIOPANCREATOGRAM  2019    Procedure: COMBINED ENDOSCOPIC RETROGRADE CHOLANGIOPANCREATOGRAPHY, Bile Duct Stent Exchange;  Surgeon: Omero Lawler MD;  Location[facility]     ENDOSCOPIC RETROGRADE CHOLANGIOPANCREATOGRAM N/A 2019    Procedure: ENDOSCOPIC RETROGRADE CHOLANGIOPANCREATOGRAPHY, WITH BILIARY STENT REMOVAL AND STONE EXTRACTION;  Surgeon: Omero Lawler MD;  Location: [facility]     HERNIORRHAPHY INGUINAL  2018    Procedure: Herniorrhaphy inguinal;  Surgeon: Emil Echevarria MD;  Location: [facility]     IR LIVER BIOPSY PERCUTANEOUS  2018     LAPAROTOMY EXPLORATORY      per the patient \"for infection in the LLQ\"      TRANSPLANT LIVER RECIPIENT  DONOR N/A 2018    Procedure: TRANSPLANT LIVER RECIPIENT  DONOR;  Surgeon: Emil Echevarria MD;  Location: [facility]          Medications  Prior to Admission medications    Medication Sig Start Date End Date Taking? Authorizing Provider   amLODIPine (NORVASC) 10 MG tablet Take 1 tablet (10 mg) by mouth daily  Patient taking differently: Take 10 mg by mouth every morning  19  Yes Inez Baker APRN CNP   aspirin (ASA) 325 MG EC tablet Take 1 tablet (325 mg) by mouth daily  Patient taking " "differently: Take 325 mg by mouth every morning  4/19/19  Yes Emil Echevarria MD   blood glucose (NO BRAND SPECIFIED) lancets standard Use to test blood sugar 4 times daily or as directed. 1/7/19 1/7/20 Yes Inez Baker APRN CNP   blood glucose (ONETOUCH VERIO IQ) test strip Use to test blood sugar 4 times daily or as directed. 1/7/19 1/7/20 Yes Inez Baker APRN CNP   gabapentin (NEURONTIN) 100 MG capsule Take 1 capsule (100 mg) by mouth 3 times daily Start 100 mg at bedtime for 1-2 weeks, then 2x daily for 1-2 weeks, then 3x daily thereafter.  Patient taking differently: Take 100 mg by mouth every morning Start 100 mg at bedtime for 1-2 weeks, then 2x daily for 1-2 weeks, then 3x daily thereafter. 5/28/19  Yes Rolly Smith MD   glucose 40 % (400 mg/mL) GEL gel Take 15-30 g by mouth every 15 minutes as needed for low blood sugar 1/7/19  Yes Inez Baker APRN CNP   metFORMIN (GLUCOPHAGE-XR) 500 MG 24 hr tablet Take 2 tablets (1,000 mg) by mouth 2 times daily (with meals)  Patient taking differently: Take 1,000 mg by mouth every morning  11/7/19  Yes Jaswinder Anne MD   metoprolol tartrate 75 MG TABS Take 75 mg by mouth 2 times daily  Patient taking differently: Take 75 mg by mouth every morning  1/7/19  Yes Inez Baker APRN CNP   multivitamin w/minerals (THERA-VIT-M) tablet Take 1 tablet by mouth daily  Patient taking differently: Take 1 tablet by mouth every morning  1/24/19  Yes Emil Echevarria MD   Sharps Container MISC 1 each daily 1/7/19  Yes Inez Baker APRN CNP   tacrolimus (GENERIC EQUIVALENT) 1 MG capsule Take 3 capsules (3 mg) by mouth every 12 hours  Patient taking differently: Take 3 mg by mouth every morning  11/6/19  Yes Tamela Carballo MD       Allergies  No Known Allergies    Review of systems  A 10-point review of systems was negative    Examination  BP (!) 156/67   Pulse 51   Ht 1.702 m (5' 7\")   Wt 84 kg (185 lb " 1.6 oz)   SpO2 98%   BMI 28.99 kg/m     Body mass index is 28.99 kg/m .    Gen- well, NAD, A+Ox3, normal color  Lym- no palpable LAD  CVS- RRR  RS- CTA  Abd-  Healed surgical scar. Mild protuberance near the umbilicus.  Extr- hands normal, no OSKAR  Skin- no rash or jaundice  Neuro- no asterixis  Psych- normal mood    Laboratory  Lab Results   Component Value Date     12/18/2019    POTASSIUM 4.5 12/18/2019    CHLORIDE 110 12/18/2019    CO2 27 12/18/2019    BUN 25 12/18/2019    CR 0.77 12/18/2019       Lab Results   Component Value Date    BILITOTAL 0.8 12/18/2019    ALT 23 12/18/2019    AST 9 12/18/2019    ALKPHOS 178 12/18/2019       Lab Results   Component Value Date    ALBUMIN 3.6 12/18/2019    PROTTOTAL 7.0 12/18/2019        Lab Results   Component Value Date    WBC 4.8 12/18/2019    HGB 12.7 12/18/2019    MCV 88 12/18/2019     12/18/2019       Lab Results   Component Value Date    INR 1.14 12/31/2018       Radiology    ASSESSMENT AND PLAN:  Status post liver transplantation.  Mr. Limon has received a liver transplantation for hepatocellular carcinoma in the context of alcoholic cirrhosis.  He is doing quite well.  His labs done today show  quasi normal liver function tests with minimal elevation of the alkaline phosphatase at 178.  Otherwise, he is doing quite well.  He is scheduled for his intermediate risk prostate cancer surgery on 01/03/2019.  We advised him to follow the recommendations of the urologists and he is open to that.  He will continue otherwise his immunosuppression.  He will be seen here in 6-12 months and in the meantime, he will follow with his primary care physician and urologist.  He will need also to see Dermatology on a yearly basis.  Please note that he was seen in October it appears.        This was a 25-minute visit, of which more than 50% was spent in explaining to the patient what our plan of care was.  We answered all his questions.      cc:     Primary care physician        Tamela Carballo MD  Hepatology  [facility]    Again, thank you for allowing me to participate in the care of your patient.      Sincerely,    Tamela Carballo MD

## 2019-12-18 NOTE — LETTER
2019      RE: Loy Limon  [address]  [facility] [address]       [facility]    Hepatology follow-up    CHIEF COMPLAINT/REASON FOR THE VISIT:  Status post liver transplantation.       :  Mr. 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  He had the diagnosis of hepatocellular carcinoma made in 2018.  He got evaluated and eventually got listed.  He received a liver on 2018.  Prior to the transplantation, he had microwave ablation.  His explant showed that there was a viable tumor and he does not show any signs of recurrences so far.  Mr. Limon, who was seen and followed by Urology for urinary issues, and in fact he is currently diagnosed with intermediate risk prostate cancer and he is scheduled to have surgery soon.      Symptom-wise, he has some right upper quadrant abdominal pain. This was seen on imaging and it appeared that this might have been related with his surgery. It showed the thickness which he feels showed that he had in the anterior abdominal wall muscle something they thought might be related with evolving post-surgical changes and they thought that this is not drainable. His appetite is good. He denies any nausea or vomiting. He is moving his bowels.  Otherwise, he is following with Urology and is going to have surgery on 2019.     Medical hx Surgical hx   Past Medical History:   Diagnosis Date     Anemia      Biliary stricture of transplanted liver (H) 2018     BPH (benign prostatic hyperplasia)      Cancer (H)     hepatocellualr carcinoma     Cirrhosis of liver (H) 2018     Diabetes (H)      Enlarged prostate      Hypothyroidism      Inguinal hernia     Repaired with mesh on 18     Liver lesion 2018     Liver transplanted (H) 2018     donor liver transplant  "    Portal vein thrombosis     on path explant     Postoperative atrial fibrillation (H) 2018     Prostate cancer (H)      TB lung, latent     Treated       Past Surgical History:   Procedure Laterality Date     APPENDECTOMY       COLONOSCOPY           ENDOSCOPIC RETROGRADE CHOLANGIOPANCREATOGRAM N/A 2018    Procedure: ENDOSCOPIC RETROGRADE CHOLANGIOPANCREATOGRAM, with Billary Sphincterotomy and Billary Stent Placement;  Surgeon: Omero Lawler MD;  Location: [facility] OR     ENDOSCOPIC RETROGRADE CHOLANGIOPANCREATOGRAM  2019    Procedure: COMBINED ENDOSCOPIC RETROGRADE CHOLANGIOPANCREATOGRAPHY, Bile Duct Stent Exchange;  Surgeon: Omero Lawler MD;  Location: [facility] OR     ENDOSCOPIC RETROGRADE CHOLANGIOPANCREATOGRAM N/A 2019    Procedure: ENDOSCOPIC RETROGRADE CHOLANGIOPANCREATOGRAPHY, WITH BILIARY STENT REMOVAL AND STONE EXTRACTION;  Surgeon: Omero Lawler MD;  Location: [facility] OR     HERNIORRHAPHY INGUINAL  2018    Procedure: Herniorrhaphy inguinal;  Surgeon: Emil Echevarria MD;  Location: [facility] OR     IR LIVER BIOPSY PERCUTANEOUS  2018     LAPAROTOMY EXPLORATORY      per the patient \"for infection in the LLQ\"      TRANSPLANT LIVER RECIPIENT  DONOR N/A 2018    Procedure: TRANSPLANT LIVER RECIPIENT  DONOR;  Surgeon: Emil Echevarria MD;  Location: [facility] OR          Medications  Prior to Admission medications    Medication Sig Start Date End Date Taking? Authorizing Provider   amLODIPine (NORVASC) 10 MG tablet Take 1 tablet (10 mg) by mouth daily  Patient taking differently: Take 10 mg by mouth every morning  19  Yes Inez Baker APRN CNP   aspirin (ASA) 325 MG EC tablet Take 1 tablet (325 mg) by mouth daily  Patient taking differently: Take 325 mg by mouth every morning  19  Yes Emil Echevarria MD   blood glucose (NO BRAND SPECIFIED) lancets standard Use to test blood sugar 4 times daily or as directed. 19 " "1/7/20 Yes Inez Baker APRN CNP   blood glucose (ONETOUCH VERIO IQ) test strip Use to test blood sugar 4 times daily or as directed. 1/7/19 1/7/20 Yes Inez Baker APRN CNP   gabapentin (NEURONTIN) 100 MG capsule Take 1 capsule (100 mg) by mouth 3 times daily Start 100 mg at bedtime for 1-2 weeks, then 2x daily for 1-2 weeks, then 3x daily thereafter.  Patient taking differently: Take 100 mg by mouth every morning Start 100 mg at bedtime for 1-2 weeks, then 2x daily for 1-2 weeks, then 3x daily thereafter. 5/28/19  Yes Rolly Smith MD   glucose 40 % (400 mg/mL) GEL gel Take 15-30 g by mouth every 15 minutes as needed for low blood sugar 1/7/19  Yes Inez Baker APRN CNP   metFORMIN (GLUCOPHAGE-XR) 500 MG 24 hr tablet Take 2 tablets (1,000 mg) by mouth 2 times daily (with meals)  Patient taking differently: Take 1,000 mg by mouth every morning  11/7/19  Yes Jaswinder Anne MD   metoprolol tartrate 75 MG TABS Take 75 mg by mouth 2 times daily  Patient taking differently: Take 75 mg by mouth every morning  1/7/19  Yes Inez Baker APRN CNP   multivitamin w/minerals (THERA-VIT-M) tablet Take 1 tablet by mouth daily  Patient taking differently: Take 1 tablet by mouth every morning  1/24/19  Yes Emil Echevarria MD   Sharps Container MISC 1 each daily 1/7/19  Yes Inez Baker APRN CNP   tacrolimus (GENERIC EQUIVALENT) 1 MG capsule Take 3 capsules (3 mg) by mouth every 12 hours  Patient taking differently: Take 3 mg by mouth every morning  11/6/19  Yes Tamela Carballo MD       Allergies  No Known Allergies    Review of systems  A 10-point review of systems was negative    Examination  BP (!) 156/67   Pulse 51   Ht 1.702 m (5' 7\")   Wt 84 kg (185 lb 1.6 oz)   SpO2 98%   BMI 28.99 kg/m     Body mass index is 28.99 kg/m .    Gen- well, NAD, A+Ox3, normal color  Lym- no palpable LAD  CVS- RRR  RS- CTA  Abd-  Healed surgical scar. Mild protuberance " near the umbilicus.  Extr- hands normal, no OSKAR  Skin- no rash or jaundice  Neuro- no asterixis  Psych- normal mood    Laboratory  Lab Results   Component Value Date     12/18/2019    POTASSIUM 4.5 12/18/2019    CHLORIDE 110 12/18/2019    CO2 27 12/18/2019    BUN 25 12/18/2019    CR 0.77 12/18/2019       Lab Results   Component Value Date    BILITOTAL 0.8 12/18/2019    ALT 23 12/18/2019    AST 9 12/18/2019    ALKPHOS 178 12/18/2019       Lab Results   Component Value Date    ALBUMIN 3.6 12/18/2019    PROTTOTAL 7.0 12/18/2019        Lab Results   Component Value Date    WBC 4.8 12/18/2019    HGB 12.7 12/18/2019    MCV 88 12/18/2019     12/18/2019       Lab Results   Component Value Date    INR 1.14 12/31/2018       Radiology    ASSESSMENT AND PLAN:  Status post liver transplantation.  Mr. Limon has received a liver transplantation for hepatocellular carcinoma in the context of alcoholic cirrhosis.  He is doing quite well.  His labs done today show  quasi normal liver function tests with minimal elevation of the alkaline phosphatase at 178.  Otherwise, he is doing quite well.  He is scheduled for his intermediate risk prostate cancer surgery on 01/03/2019.  We advised him to follow the recommendations of the urologists and he is open to that.  He will continue otherwise his immunosuppression.  He will be seen here in 6-12 months and in the meantime, he will follow with his primary care physician and urologist.  He will need also to see Dermatology on a yearly basis.  Please note that he was seen in October it appears.        This was a 25-minute visit, of which more than 50% was spent in explaining to the patient what our plan of care was.  We answered all his questions.      cc:     Primary care physician       Tamela Carballo MD  Hepatology  [facility]    Tamela Carballo MD

## 2019-12-19 VITALS — BODY MASS INDEX: 29.05 KG/M2 | WEIGHT: 185.5 LBS

## 2019-12-19 LAB — ETHYL GLUCURONIDE UR QL: NEGATIVE

## 2019-12-19 NOTE — PROGRESS NOTES
"Diabetes Self-Management Education & Support    Diabetes Education Self Management & Training    SUBJECTIVE/OBJECTIVE:     Cultural Influences/Ethnic Background:  [place]  Liver transplant for cirrhosis and hepatobiliary cancer done 2018.  States that he was pre-diabetic prior to surgery.  + family history of diabetes--states that his brother \" from diabetes\".    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     Diabetes Symptoms & Complications    Patient Problem List and Family Medical History reviewed for relevant medical history, current medical status, and diabetes risk factors.    Vitals:  There were no vitals taken for this visit.  Estimated body mass index is 29.05 kg/m  as calculated from the following:    Height as of an earlier encounter on 19: 1.702 m (5' 7\").    Weight as of 19: 84.1 kg (185 lb 8 oz).   Last 3 BP:   BP Readings from Last 3 Encounters:   19 (!) 156/67   19 124/71   19 (!) 147/77       History   Smoking Status     Former Smoker     Packs/day: 0.03     Years: 20.00     Types: Cigarettes, Cigars     Start date: 1970     Quit date: 3/14/2018   Smokeless Tobacco     Never Used     Comment: Quit smoking 2018, one cigarette after dinner       Labs:  Lab Results   Component Value Date    A1C 7.3 2019     Lab Results   Component Value Date     2019     Lab Results   Component Value Date    LDL 71 10/23/2019     HDL Cholesterol   Date Value Ref Range Status   10/23/2019 38 (L) >39 mg/dL Final   ]  GFR Estimate   Date Value Ref Range Status   2019 >90 >60 mL/min/[1.73_m2] Final     Comment:     Non  GFR Calc  Starting 2018, serum creatinine based estimated GFR (eGFR) will be   calculated using the Chronic Kidney Disease Epidemiology Collaboration   (CKD-EPI) equation.       GFR Estimate If Black   Date Value Ref Range Status   2019 >90 >60 " "mL/min/[1.73_m2] Final     Comment:      GFR Calc  Starting 12/18/2018, serum creatinine based estimated GFR (eGFR) will be   calculated using the Chronic Kidney Disease Epidemiology Collaboration   (CKD-EPI) equation.       Lab Results   Component Value Date    CR 0.77 12/18/2019     No results found for: MICROALBUMIN    Healthy Eating:  See Izabela Valles's note from today.  We expanded on some of the ideas presented there and focused on food again at this visit.   He is eating significant amounts of carbohydrate at all meals (> 100 grams per meal).  He does not want to become \"skinny.\"      Being Active:  No additional exercise outside of his job, which requires a lot of standing.       Monitoring:  Once daily in the AM.  All fasting blood glucoses fall between 112 and 151, with an overall average of 131.  He has done no after meal testing.      Taking Medications  Diabetes Medication(s)     Biguanides       metFORMIN (GLUCOPHAGE-XR) 500 MG 24 hr tablet    Take 2 tablets (1,000 mg) by mouth 2 times daily (with meals)     Patient taking differently:  Take 1,000 mg by mouth every morning     Diabetic Other       glucose 40 % (400 mg/mL) GEL gel    Take 15-30 g by mouth every 15 minutes as needed for low blood sugar        ASSESSMENT:    Attends with his wife Drea, and .    He comes in to this appointment after talking with NEFTALI Flores.  He has some questions remaining about portion sizes.    Patient's most recent   Lab Results   Component Value Date    A1C 7.3 09/04/2019    is not meeting goal of <7.0    INTERVENTION:   Diabetes knowledge and skills assessment:     Patient is knowledgeable in diabetes management concepts related to: Needs education in all aspects of diabetes self management.     Education provided today on:  AADE Self-Care Behaviors:    Discussed what foods are carbohydrates and which foods are not carbohydrates, and will not increase blood glucose.  " Izabela did a detailed assessment of what he is currently eating, and he elaborates on this.  His current dietary intake of carbohydrates is >100 grams at each meal.      Reviewed dietary recommendations for no more than about 75 grams of CHO at each meal and discussed with food models what this would actually look like.  It means 2 tortillas with his meals instead of 6-7, which is what he is currently eating.  We talked about ideas for breakfast, which he finds hard to manage since he has to get up for work very early.  Right now he is eating a half roll or a full roll of Ritz crackers every morning.  He states that in the past, he has made himself fruit smoothies every morning (protein powder, fruit and milk).  I told him I thought this was probably a better idea than Ritz crackers, and the additional protein would probably stay with him a little longer.  Discussed instead of adding fruit to his meals, he needs to think about using fruit as a snack between meals instead, as he will likely be more hungry between meals if he is cutting back his portions.  He has support of his wife Drea, who said that she will help him with this.      He is currently not drinking any sweetened beverages, which is good.  Given positive feedback for making changes, and attending appointments.     Opportunities for ongoing education and support in diabetes-self management were discussed.    Pt verbalized understanding of concepts discussed and recommendations provided today.       PLAN:  See Patient Instructions for co-developed, patient-stated behavior change goals.  AVS printed and provided to patient today. See Follow-Up section for recommended follow-up.    Time Spent: 60 minutes  Encounter Type: Individual    Any diabetes medication dose changes were made via the CDE Protocol and Collaborative Practice Agreement with the patient's referring provider. A copy of this encounter was shared with the provider.

## 2020-01-02 ENCOUNTER — ANESTHESIA EVENT (OUTPATIENT)
Dept: SURGERY | Facility: CLINIC | Age: 65
End: 2020-01-02
Payer: COMMERCIAL

## 2020-01-02 ASSESSMENT — ENCOUNTER SYMPTOMS
SEIZURES: 0
DYSRHYTHMIAS: 1

## 2020-01-02 ASSESSMENT — LIFESTYLE VARIABLES: TOBACCO_USE: 1

## 2020-01-02 ASSESSMENT — COPD QUESTIONNAIRES: COPD: 0

## 2020-01-03 ENCOUNTER — ANESTHESIA (OUTPATIENT)
Dept: SURGERY | Facility: CLINIC | Age: 65
End: 2020-01-03
Payer: COMMERCIAL

## 2020-01-03 ENCOUNTER — OFFICE VISIT (OUTPATIENT)
Dept: INTERPRETER SERVICES | Facility: CLINIC | Age: 65
End: 2020-01-03
Payer: COMMERCIAL

## 2020-01-03 ENCOUNTER — HOSPITAL ENCOUNTER (OUTPATIENT)
Facility: CLINIC | Age: 65
Discharge: HOME | End: 2020-01-05
Attending: UROLOGY | Admitting: UROLOGY
Payer: COMMERCIAL

## 2020-01-03 ENCOUNTER — SURGERY (OUTPATIENT)
Age: 65
End: 2020-01-03
Payer: COMMERCIAL

## 2020-01-03 DIAGNOSIS — C61 PROSTATE CANCER (H): ICD-10-CM

## 2020-01-03 DIAGNOSIS — C61 PROSTATE CANCER (H): Primary | ICD-10-CM

## 2020-01-03 LAB
ABO + RH BLD: NORMAL
ABO + RH BLD: NORMAL
ANION GAP SERPL CALCULATED.3IONS-SCNC: 8 MMOL/L (ref 3–14)
BLD GP AB SCN SERPL QL: NORMAL
BLD PROD TYP BPU: NORMAL
BLOOD BANK CMNT PATIENT-IMP: NORMAL
BLOOD BANK CMNT PATIENT-IMP: NORMAL
BUN SERPL-MCNC: 23 MG/DL (ref 7–30)
CALCIUM SERPL-MCNC: 7.5 MG/DL (ref 8.5–10.1)
CHLORIDE SERPL-SCNC: 109 MMOL/L (ref 94–109)
CO2 SERPL-SCNC: 23 MMOL/L (ref 20–32)
CREAT SERPL-MCNC: 0.79 MG/DL (ref 0.66–1.25)
ERYTHROCYTE [DISTWIDTH] IN BLOOD BY AUTOMATED COUNT: 12.6 % (ref 10–15)
GFR SERPL CREATININE-BSD FRML MDRD: >90 ML/MIN/{1.73_M2}
GLUCOSE BLDC GLUCOMTR-MCNC: 142 MG/DL (ref 70–99)
GLUCOSE BLDC GLUCOMTR-MCNC: 306 MG/DL (ref 70–99)
GLUCOSE SERPL-MCNC: 253 MG/DL (ref 70–99)
HBA1C MFR BLD: 7.2 % (ref 0–5.6)
HCT VFR BLD AUTO: 36.6 % (ref 40–53)
HGB BLD-MCNC: 11.9 G/DL (ref 13.3–17.7)
MCH RBC QN AUTO: 28.7 PG (ref 26.5–33)
MCHC RBC AUTO-ENTMCNC: 32.5 G/DL (ref 31.5–36.5)
MCV RBC AUTO: 88 FL (ref 78–100)
NUM BPU REQUESTED: 2
PLATELET # BLD AUTO: 169 10E9/L (ref 150–450)
POTASSIUM SERPL-SCNC: 5.2 MMOL/L (ref 3.4–5.3)
RBC # BLD AUTO: 4.15 10E12/L (ref 4.4–5.9)
SODIUM SERPL-SCNC: 140 MMOL/L (ref 133–144)
SPECIMEN EXP DATE BLD: NORMAL
WBC # BLD AUTO: 7.8 10E9/L (ref 4–11)

## 2020-01-03 PROCEDURE — 25000125 ZZHC RX 250: Performed by: NURSE ANESTHETIST, CERTIFIED REGISTERED

## 2020-01-03 PROCEDURE — 25000125 ZZHC RX 250: Performed by: STUDENT IN AN ORGANIZED HEALTH CARE EDUCATION/TRAINING PROGRAM

## 2020-01-03 PROCEDURE — 25800030 ZZH RX IP 258 OP 636: Performed by: STUDENT IN AN ORGANIZED HEALTH CARE EDUCATION/TRAINING PROGRAM

## 2020-01-03 PROCEDURE — 88305 TISSUE EXAM BY PATHOLOGIST: CPT | Performed by: UROLOGY

## 2020-01-03 PROCEDURE — 40000170 ZZH STATISTIC PRE-PROCEDURE ASSESSMENT II: Performed by: UROLOGY

## 2020-01-03 PROCEDURE — 36000086 ZZH SURGERY LEVEL 8 1ST 30 MIN UMMC: Performed by: UROLOGY

## 2020-01-03 PROCEDURE — 40000065 ZZH STATISTIC EKG NON-CHARGEABLE

## 2020-01-03 PROCEDURE — 37000009 ZZH ANESTHESIA TECHNICAL FEE, EACH ADDTL 15 MIN: Performed by: UROLOGY

## 2020-01-03 PROCEDURE — 00000159 ZZHCL STATISTIC H-SEND OUTS PREP: Performed by: UROLOGY

## 2020-01-03 PROCEDURE — 71000015 ZZH RECOVERY PHASE 1 LEVEL 2 EA ADDTL HR: Performed by: UROLOGY

## 2020-01-03 PROCEDURE — 25000128 H RX IP 250 OP 636: Performed by: UROLOGY

## 2020-01-03 PROCEDURE — 85027 COMPLETE CBC AUTOMATED: CPT | Performed by: STUDENT IN AN ORGANIZED HEALTH CARE EDUCATION/TRAINING PROGRAM

## 2020-01-03 PROCEDURE — 25000132 ZZH RX MED GY IP 250 OP 250 PS 637: Performed by: STUDENT IN AN ORGANIZED HEALTH CARE EDUCATION/TRAINING PROGRAM

## 2020-01-03 PROCEDURE — 25000566 ZZH SEVOFLURANE, EA 15 MIN: Performed by: UROLOGY

## 2020-01-03 PROCEDURE — 25000131 ZZH RX MED GY IP 250 OP 636 PS 637: Performed by: STUDENT IN AN ORGANIZED HEALTH CARE EDUCATION/TRAINING PROGRAM

## 2020-01-03 PROCEDURE — 25000128 H RX IP 250 OP 636: Performed by: ANESTHESIOLOGY

## 2020-01-03 PROCEDURE — 88307 TISSUE EXAM BY PATHOLOGIST: CPT | Mod: 26 | Performed by: UROLOGY

## 2020-01-03 PROCEDURE — 27210794 ZZH OR GENERAL SUPPLY STERILE: Performed by: UROLOGY

## 2020-01-03 PROCEDURE — 80048 BASIC METABOLIC PNL TOTAL CA: CPT | Performed by: STUDENT IN AN ORGANIZED HEALTH CARE EDUCATION/TRAINING PROGRAM

## 2020-01-03 PROCEDURE — 25000128 H RX IP 250 OP 636: Performed by: STUDENT IN AN ORGANIZED HEALTH CARE EDUCATION/TRAINING PROGRAM

## 2020-01-03 PROCEDURE — 25000128 H RX IP 250 OP 636: Performed by: NURSE ANESTHETIST, CERTIFIED REGISTERED

## 2020-01-03 PROCEDURE — 93010 ELECTROCARDIOGRAM REPORT: CPT | Mod: 59 | Performed by: INTERNAL MEDICINE

## 2020-01-03 PROCEDURE — 36415 COLL VENOUS BLD VENIPUNCTURE: CPT | Performed by: STUDENT IN AN ORGANIZED HEALTH CARE EDUCATION/TRAINING PROGRAM

## 2020-01-03 PROCEDURE — T1013 SIGN LANG/ORAL INTERPRETER: HCPCS | Mod: U3

## 2020-01-03 PROCEDURE — 71000014 ZZH RECOVERY PHASE 1 LEVEL 2 FIRST HR: Performed by: UROLOGY

## 2020-01-03 PROCEDURE — 25000132 ZZH RX MED GY IP 250 OP 250 PS 637: Performed by: UROLOGY

## 2020-01-03 PROCEDURE — 88305 TISSUE EXAM BY PATHOLOGIST: CPT | Mod: 26 | Performed by: UROLOGY

## 2020-01-03 PROCEDURE — 36000088 ZZH SURGERY LEVEL 8 EA 15 ADDTL MIN - UMMC: Performed by: UROLOGY

## 2020-01-03 PROCEDURE — 37000008 ZZH ANESTHESIA TECHNICAL FEE, 1ST 30 MIN: Performed by: UROLOGY

## 2020-01-03 PROCEDURE — 83036 HEMOGLOBIN GLYCOSYLATED A1C: CPT | Performed by: STUDENT IN AN ORGANIZED HEALTH CARE EDUCATION/TRAINING PROGRAM

## 2020-01-03 PROCEDURE — 88307 TISSUE EXAM BY PATHOLOGIST: CPT | Performed by: UROLOGY

## 2020-01-03 PROCEDURE — 25000565 ZZH ISOFLURANE, EA 15 MIN: Performed by: UROLOGY

## 2020-01-03 PROCEDURE — 82962 GLUCOSE BLOOD TEST: CPT

## 2020-01-03 RX ORDER — HYDROMORPHONE HYDROCHLORIDE 1 MG/ML
.3-.5 INJECTION, SOLUTION INTRAMUSCULAR; INTRAVENOUS; SUBCUTANEOUS EVERY 10 MIN PRN
Status: DISCONTINUED | OUTPATIENT
Start: 2020-01-03 | End: 2020-01-03 | Stop reason: HOSPADM

## 2020-01-03 RX ORDER — ONDANSETRON 2 MG/ML
INJECTION INTRAMUSCULAR; INTRAVENOUS PRN
Status: DISCONTINUED | OUTPATIENT
Start: 2020-01-03 | End: 2020-01-03

## 2020-01-03 RX ORDER — SODIUM CHLORIDE, SODIUM LACTATE, POTASSIUM CHLORIDE, CALCIUM CHLORIDE 600; 310; 30; 20 MG/100ML; MG/100ML; MG/100ML; MG/100ML
INJECTION, SOLUTION INTRAVENOUS CONTINUOUS PRN
Status: DISCONTINUED | OUTPATIENT
Start: 2020-01-03 | End: 2020-01-03

## 2020-01-03 RX ORDER — CEFAZOLIN SODIUM 2 G/100ML
2 INJECTION, SOLUTION INTRAVENOUS EVERY 8 HOURS
Status: COMPLETED | OUTPATIENT
Start: 2020-01-04 | End: 2020-01-04

## 2020-01-03 RX ORDER — HYDRALAZINE HYDROCHLORIDE 20 MG/ML
5 INJECTION INTRAMUSCULAR; INTRAVENOUS
Status: DISCONTINUED | OUTPATIENT
Start: 2020-01-03 | End: 2020-01-03 | Stop reason: HOSPADM

## 2020-01-03 RX ORDER — ONDANSETRON 4 MG/1
4 TABLET, ORALLY DISINTEGRATING ORAL EVERY 30 MIN PRN
Status: DISCONTINUED | OUTPATIENT
Start: 2020-01-03 | End: 2020-01-03 | Stop reason: HOSPADM

## 2020-01-03 RX ORDER — METOPROLOL TARTRATE 25 MG/1
75 TABLET, FILM COATED ORAL ONCE
Status: COMPLETED | OUTPATIENT
Start: 2020-01-03 | End: 2020-01-03

## 2020-01-03 RX ORDER — PROPOFOL 10 MG/ML
INJECTION, EMULSION INTRAVENOUS PRN
Status: DISCONTINUED | OUTPATIENT
Start: 2020-01-03 | End: 2020-01-03

## 2020-01-03 RX ORDER — CEFAZOLIN SODIUM 2 G/100ML
2 INJECTION, SOLUTION INTRAVENOUS
Status: COMPLETED | OUTPATIENT
Start: 2020-01-03 | End: 2020-01-03

## 2020-01-03 RX ORDER — NALOXONE HYDROCHLORIDE 0.4 MG/ML
.1-.4 INJECTION, SOLUTION INTRAMUSCULAR; INTRAVENOUS; SUBCUTANEOUS
Status: DISCONTINUED | OUTPATIENT
Start: 2020-01-03 | End: 2020-01-05 | Stop reason: HOSPADM

## 2020-01-03 RX ORDER — LIDOCAINE 40 MG/G
CREAM TOPICAL
Status: DISCONTINUED | OUTPATIENT
Start: 2020-01-03 | End: 2020-01-05 | Stop reason: HOSPADM

## 2020-01-03 RX ORDER — NICOTINE POLACRILEX 4 MG
15-30 LOZENGE BUCCAL
Status: DISCONTINUED | OUTPATIENT
Start: 2020-01-03 | End: 2020-01-05 | Stop reason: HOSPADM

## 2020-01-03 RX ORDER — ONDANSETRON 2 MG/ML
4 INJECTION INTRAMUSCULAR; INTRAVENOUS EVERY 30 MIN PRN
Status: DISCONTINUED | OUTPATIENT
Start: 2020-01-03 | End: 2020-01-03 | Stop reason: HOSPADM

## 2020-01-03 RX ORDER — LABETALOL HYDROCHLORIDE 5 MG/ML
INJECTION, SOLUTION INTRAVENOUS PRN
Status: DISCONTINUED | OUTPATIENT
Start: 2020-01-03 | End: 2020-01-03

## 2020-01-03 RX ORDER — MEPERIDINE HYDROCHLORIDE 25 MG/ML
12.5 INJECTION INTRAMUSCULAR; INTRAVENOUS; SUBCUTANEOUS
Status: DISCONTINUED | OUTPATIENT
Start: 2020-01-03 | End: 2020-01-03 | Stop reason: HOSPADM

## 2020-01-03 RX ORDER — OXYCODONE HYDROCHLORIDE 5 MG/1
5-10 TABLET ORAL EVERY 4 HOURS PRN
Status: DISCONTINUED | OUTPATIENT
Start: 2020-01-03 | End: 2020-01-05 | Stop reason: HOSPADM

## 2020-01-03 RX ORDER — ONDANSETRON 2 MG/ML
4 INJECTION INTRAMUSCULAR; INTRAVENOUS EVERY 6 HOURS PRN
Status: DISCONTINUED | OUTPATIENT
Start: 2020-01-03 | End: 2020-01-05 | Stop reason: HOSPADM

## 2020-01-03 RX ORDER — ALFUZOSIN HYDROCHLORIDE 10 MG/1
10 TABLET, EXTENDED RELEASE ORAL DAILY
COMMUNITY
End: 2020-02-20

## 2020-01-03 RX ORDER — LIDOCAINE HYDROCHLORIDE 20 MG/ML
INJECTION, SOLUTION INFILTRATION; PERINEURAL PRN
Status: DISCONTINUED | OUTPATIENT
Start: 2020-01-03 | End: 2020-01-03

## 2020-01-03 RX ORDER — BUPIVACAINE HYDROCHLORIDE 2.5 MG/ML
INJECTION, SOLUTION INFILTRATION; PERINEURAL PRN
Status: DISCONTINUED | OUTPATIENT
Start: 2020-01-03 | End: 2020-01-03 | Stop reason: HOSPADM

## 2020-01-03 RX ORDER — HYDROMORPHONE HYDROCHLORIDE 1 MG/ML
.2-.4 INJECTION, SOLUTION INTRAMUSCULAR; INTRAVENOUS; SUBCUTANEOUS
Status: DISCONTINUED | OUTPATIENT
Start: 2020-01-03 | End: 2020-01-05

## 2020-01-03 RX ORDER — OXYCODONE HYDROCHLORIDE 5 MG/1
5 TABLET ORAL EVERY 6 HOURS PRN
Qty: 20 TABLET | Refills: 0 | Status: SHIPPED | OUTPATIENT
Start: 2020-01-03 | End: 2020-01-04

## 2020-01-03 RX ORDER — CALCIUM CARBONATE 500 MG/1
1000 TABLET, CHEWABLE ORAL 4 TIMES DAILY PRN
Status: DISCONTINUED | OUTPATIENT
Start: 2020-01-03 | End: 2020-01-05 | Stop reason: HOSPADM

## 2020-01-03 RX ORDER — FENTANYL CITRATE 50 UG/ML
INJECTION, SOLUTION INTRAMUSCULAR; INTRAVENOUS PRN
Status: DISCONTINUED | OUTPATIENT
Start: 2020-01-03 | End: 2020-01-03

## 2020-01-03 RX ORDER — NALOXONE HYDROCHLORIDE 0.4 MG/ML
.1-.4 INJECTION, SOLUTION INTRAMUSCULAR; INTRAVENOUS; SUBCUTANEOUS
Status: DISCONTINUED | OUTPATIENT
Start: 2020-01-03 | End: 2020-01-03 | Stop reason: HOSPADM

## 2020-01-03 RX ORDER — PROCHLORPERAZINE MALEATE 5 MG
5 TABLET ORAL EVERY 6 HOURS PRN
Status: DISCONTINUED | OUTPATIENT
Start: 2020-01-03 | End: 2020-01-05 | Stop reason: HOSPADM

## 2020-01-03 RX ORDER — TACROLIMUS 1 MG/1
3 CAPSULE ORAL EVERY MORNING
Status: DISCONTINUED | OUTPATIENT
Start: 2020-01-04 | End: 2020-01-04

## 2020-01-03 RX ORDER — DEXTROSE MONOHYDRATE 25 G/50ML
25-50 INJECTION, SOLUTION INTRAVENOUS
Status: DISCONTINUED | OUTPATIENT
Start: 2020-01-03 | End: 2020-01-05 | Stop reason: HOSPADM

## 2020-01-03 RX ORDER — GABAPENTIN 300 MG/1
300 CAPSULE ORAL ONCE
Status: COMPLETED | OUTPATIENT
Start: 2020-01-03 | End: 2020-01-03

## 2020-01-03 RX ORDER — METOPROLOL TARTRATE 75 MG/1
75 TABLET, FILM COATED ORAL 2 TIMES DAILY
Status: DISCONTINUED | OUTPATIENT
Start: 2020-01-03 | End: 2020-01-04

## 2020-01-03 RX ORDER — SODIUM CHLORIDE, SODIUM LACTATE, POTASSIUM CHLORIDE, CALCIUM CHLORIDE 600; 310; 30; 20 MG/100ML; MG/100ML; MG/100ML; MG/100ML
INJECTION, SOLUTION INTRAVENOUS CONTINUOUS
Status: DISCONTINUED | OUTPATIENT
Start: 2020-01-03 | End: 2020-01-03 | Stop reason: HOSPADM

## 2020-01-03 RX ORDER — MAGNESIUM CARB/ALUMINUM HYDROX 105-160MG
30 TABLET,CHEWABLE ORAL DAILY
Status: DISCONTINUED | OUTPATIENT
Start: 2020-01-04 | End: 2020-01-05 | Stop reason: HOSPADM

## 2020-01-03 RX ORDER — SODIUM CHLORIDE 9 MG/ML
INJECTION, SOLUTION INTRAVENOUS CONTINUOUS
Status: DISCONTINUED | OUTPATIENT
Start: 2020-01-03 | End: 2020-01-05 | Stop reason: HOSPADM

## 2020-01-03 RX ORDER — ONDANSETRON 4 MG/1
4 TABLET, ORALLY DISINTEGRATING ORAL EVERY 6 HOURS PRN
Status: DISCONTINUED | OUTPATIENT
Start: 2020-01-03 | End: 2020-01-05 | Stop reason: HOSPADM

## 2020-01-03 RX ORDER — FENTANYL CITRATE 50 UG/ML
25-50 INJECTION, SOLUTION INTRAMUSCULAR; INTRAVENOUS
Status: DISCONTINUED | OUTPATIENT
Start: 2020-01-03 | End: 2020-01-03 | Stop reason: HOSPADM

## 2020-01-03 RX ORDER — DEXAMETHASONE SODIUM PHOSPHATE 4 MG/ML
INJECTION, SOLUTION INTRA-ARTICULAR; INTRALESIONAL; INTRAMUSCULAR; INTRAVENOUS; SOFT TISSUE PRN
Status: DISCONTINUED | OUTPATIENT
Start: 2020-01-03 | End: 2020-01-03

## 2020-01-03 RX ORDER — BISACODYL 10 MG
10 SUPPOSITORY, RECTAL RECTAL DAILY
Status: DISCONTINUED | OUTPATIENT
Start: 2020-01-04 | End: 2020-01-05 | Stop reason: HOSPADM

## 2020-01-03 RX ORDER — CEFAZOLIN SODIUM 1 G/3ML
1 INJECTION, POWDER, FOR SOLUTION INTRAMUSCULAR; INTRAVENOUS SEE ADMIN INSTRUCTIONS
Status: DISCONTINUED | OUTPATIENT
Start: 2020-01-03 | End: 2020-01-03 | Stop reason: HOSPADM

## 2020-01-03 RX ORDER — ACETAMINOPHEN 325 MG/1
975 TABLET ORAL ONCE
Status: DISCONTINUED | OUTPATIENT
Start: 2020-01-03 | End: 2020-01-03 | Stop reason: HOSPADM

## 2020-01-03 RX ADMIN — ROCURONIUM BROMIDE 20 MG: 10 INJECTION INTRAVENOUS at 18:31

## 2020-01-03 RX ADMIN — HYDROMORPHONE HYDROCHLORIDE 0.5 MG: 1 INJECTION, SOLUTION INTRAMUSCULAR; INTRAVENOUS; SUBCUTANEOUS at 15:45

## 2020-01-03 RX ADMIN — FENTANYL CITRATE 25 MCG: 50 INJECTION, SOLUTION INTRAMUSCULAR; INTRAVENOUS at 20:40

## 2020-01-03 RX ADMIN — BUPIVACAINE HYDROCHLORIDE 10 ML: 2.5 INJECTION, SOLUTION INFILTRATION; PERINEURAL at 19:53

## 2020-01-03 RX ADMIN — HYDRALAZINE HYDROCHLORIDE 5 MG: 20 INJECTION INTRAMUSCULAR; INTRAVENOUS at 21:02

## 2020-01-03 RX ADMIN — CEFAZOLIN SODIUM 2 G: 2 INJECTION, SOLUTION INTRAVENOUS at 14:35

## 2020-01-03 RX ADMIN — LIDOCAINE HYDROCHLORIDE 80 MG: 20 INJECTION, SOLUTION INFILTRATION; PERINEURAL at 13:52

## 2020-01-03 RX ADMIN — DEXAMETHASONE SODIUM PHOSPHATE 8 MG: 4 INJECTION, SOLUTION INTRAMUSCULAR; INTRAVENOUS at 13:53

## 2020-01-03 RX ADMIN — SUGAMMADEX 400 MG: 100 INJECTION, SOLUTION INTRAVENOUS at 19:50

## 2020-01-03 RX ADMIN — LABETALOL HYDROCHLORIDE 10 MG: 5 INJECTION INTRAVENOUS at 20:00

## 2020-01-03 RX ADMIN — ROCURONIUM BROMIDE 20 MG: 10 INJECTION INTRAVENOUS at 17:25

## 2020-01-03 RX ADMIN — SODIUM CHLORIDE: 9 INJECTION, SOLUTION INTRAVENOUS at 23:02

## 2020-01-03 RX ADMIN — FENTANYL CITRATE 50 MCG: 50 INJECTION, SOLUTION INTRAMUSCULAR; INTRAVENOUS at 18:23

## 2020-01-03 RX ADMIN — METOPROLOL TARTRATE 75 MG: 25 TABLET ORAL at 22:55

## 2020-01-03 RX ADMIN — CEFAZOLIN 1 G: 1 INJECTION, POWDER, FOR SOLUTION INTRAMUSCULAR; INTRAVENOUS at 18:31

## 2020-01-03 RX ADMIN — INSULIN ASPART 4 UNITS: 100 INJECTION, SOLUTION INTRAVENOUS; SUBCUTANEOUS at 22:56

## 2020-01-03 RX ADMIN — CEFAZOLIN 1 G: 1 INJECTION, POWDER, FOR SOLUTION INTRAMUSCULAR; INTRAVENOUS at 16:35

## 2020-01-03 RX ADMIN — HYDROMORPHONE HYDROCHLORIDE 0.5 MG: 1 INJECTION, SOLUTION INTRAMUSCULAR; INTRAVENOUS; SUBCUTANEOUS at 21:49

## 2020-01-03 RX ADMIN — HYDROMORPHONE HYDROCHLORIDE 0.5 MG: 1 INJECTION, SOLUTION INTRAMUSCULAR; INTRAVENOUS; SUBCUTANEOUS at 21:30

## 2020-01-03 RX ADMIN — ROCURONIUM BROMIDE 20 MG: 10 INJECTION INTRAVENOUS at 16:52

## 2020-01-03 RX ADMIN — GABAPENTIN 300 MG: 300 CAPSULE ORAL at 11:28

## 2020-01-03 RX ADMIN — ROCURONIUM BROMIDE 20 MG: 10 INJECTION INTRAVENOUS at 19:12

## 2020-01-03 RX ADMIN — HYDRALAZINE HYDROCHLORIDE 5 MG: 20 INJECTION INTRAMUSCULAR; INTRAVENOUS at 20:29

## 2020-01-03 RX ADMIN — ROCURONIUM BROMIDE 50 MG: 10 INJECTION INTRAVENOUS at 13:54

## 2020-01-03 RX ADMIN — HYDRALAZINE HYDROCHLORIDE 5 MG: 20 INJECTION INTRAMUSCULAR; INTRAVENOUS at 21:36

## 2020-01-03 RX ADMIN — SODIUM CHLORIDE, POTASSIUM CHLORIDE, SODIUM LACTATE AND CALCIUM CHLORIDE: 600; 310; 30; 20 INJECTION, SOLUTION INTRAVENOUS at 14:06

## 2020-01-03 RX ADMIN — FENTANYL CITRATE 50 MCG: 50 INJECTION, SOLUTION INTRAMUSCULAR; INTRAVENOUS at 14:57

## 2020-01-03 RX ADMIN — PROPOFOL 200 MG: 10 INJECTION, EMULSION INTRAVENOUS at 13:53

## 2020-01-03 RX ADMIN — SODIUM CHLORIDE, POTASSIUM CHLORIDE, SODIUM LACTATE AND CALCIUM CHLORIDE: 600; 310; 30; 20 INJECTION, SOLUTION INTRAVENOUS at 13:45

## 2020-01-03 RX ADMIN — ONDANSETRON 4 MG: 2 INJECTION INTRAMUSCULAR; INTRAVENOUS at 19:32

## 2020-01-03 RX ADMIN — FENTANYL CITRATE 50 MCG: 50 INJECTION, SOLUTION INTRAMUSCULAR; INTRAVENOUS at 14:48

## 2020-01-03 RX ADMIN — ROCURONIUM BROMIDE 20 MG: 10 INJECTION INTRAVENOUS at 15:43

## 2020-01-03 RX ADMIN — LABETALOL HYDROCHLORIDE 10 MG: 5 INJECTION INTRAVENOUS at 19:28

## 2020-01-03 RX ADMIN — FENTANYL CITRATE 25 MCG: 50 INJECTION, SOLUTION INTRAMUSCULAR; INTRAVENOUS at 21:14

## 2020-01-03 RX ADMIN — FENTANYL CITRATE 50 MCG: 50 INJECTION, SOLUTION INTRAMUSCULAR; INTRAVENOUS at 16:52

## 2020-01-03 RX ADMIN — SODIUM CHLORIDE: 9 INJECTION, SOLUTION INTRAVENOUS at 23:01

## 2020-01-03 RX ADMIN — OXYCODONE HYDROCHLORIDE 5 MG: 5 TABLET ORAL at 22:55

## 2020-01-03 RX ADMIN — ROCURONIUM BROMIDE 20 MG: 10 INJECTION INTRAVENOUS at 14:48

## 2020-01-03 RX ADMIN — FENTANYL CITRATE 100 MCG: 50 INJECTION, SOLUTION INTRAMUSCULAR; INTRAVENOUS at 13:52

## 2020-01-03 ASSESSMENT — ACTIVITIES OF DAILY LIVING (ADL)
RETIRED_COMMUNICATION: 0-->UNDERSTANDS/COMMUNICATES WITHOUT DIFFICULTY
COGNITION: 0 - NO COGNITION ISSUES REPORTED
TOILETING: 0-->INDEPENDENT
TRANSFERRING: 0-->INDEPENDENT
SWALLOWING: 0-->SWALLOWS FOODS/LIQUIDS WITHOUT DIFFICULTY
DRESS: 0-->INDEPENDENT
AMBULATION: 0-->INDEPENDENT
BATHING: 0-->INDEPENDENT
RETIRED_EATING: 0-->INDEPENDENT
PRIOR_FUNCTIONAL_LEVEL_COMMENT: INDEPENDENT
FALL_HISTORY_WITHIN_LAST_SIX_MONTHS: NO

## 2020-01-03 ASSESSMENT — MIFFLIN-ST. JEOR: SCORE: 1557.75

## 2020-01-03 NOTE — ANESTHESIA PROCEDURE NOTES
Arterial Line Procedure Note  Staff:     Anesthesiologist:  Nayeli Donohue MD    Resident/CRNA:  Chivo Ward MD    Arterial line performed by resident/CRNA in presence of a teaching physician    Location: In OR After Induction  Procedure Start/Stop Times:     patient identified, IV checked, site marked, risks and benefits discussed, informed consent, monitors and equipment checked, pre-op evaluation and at physician/surgeon's request      Correct Patient: Yes      Correct Position: Yes      Correct Site: Yes      Correct Procedure: Yes      Correct Laterality:  Yes    Site Marked:  Yes  Line Placement:     Procedure:  Arterial Line    Insertion Site:  Radial    Insertion laterality:  Right    Skin Prep: Chloraprep      Patient Prep: patient draped, mask, sterile gloves, hat and hand hygiene      Local skin infiltration:  None    Ultrasound Guided?: Yes      Artery evaluated via ultrasound confirming patency.   Using realtime imaging, the artery was punctured and the needle was observed entering the artery.      A permanent image is entered into patient's chart.      Catheter size:  20 gauge, 12 cm    Dressing:  Tegaderm    Complications:  None obvious    Arterial waveform: Yes      IBP within 10% of NIBP: Yes

## 2020-01-03 NOTE — ANESTHESIA PREPROCEDURE EVALUATION
"Anesthesia Pre-Procedure Evaluation    Patient: Loy Limon   MRN:     [id number] Gender:   male   Age:    64 year old :      1955        Preoperative Diagnosis: Prostate cancer (H) [C61]   Procedure(s):  Robot-assisted laparoscopic radical prostatectomy,  possible lymphadenectomy  possible open     Past Medical History:   Diagnosis Date     Anemia      Biliary stricture of transplanted liver (H) 2018     BPH (benign prostatic hyperplasia)      Cancer (H)     hepatocellualr carcinoma     Cirrhosis of liver (H) 2018     Diabetes (H)      Enlarged prostate      Hypothyroidism      Inguinal hernia     Repaired with mesh on 18     Liver lesion 2018     Liver transplanted (H) 2018     donor liver transplant     Portal vein thrombosis     on path explant     Postoperative atrial fibrillation (H) 2018     Prostate cancer (H)      TB lung, latent     Treated       Past Surgical History:   Procedure Laterality Date     APPENDECTOMY       COLONOSCOPY           ENDOSCOPIC RETROGRADE CHOLANGIOPANCREATOGRAM N/A 2018    Procedure: ENDOSCOPIC RETROGRADE CHOLANGIOPANCREATOGRAM, with Billary Sphincterotomy and Billary Stent Placement;  Surgeon: Omero Lawler MD;  [facility]     ENDOSCOPIC RETROGRADE CHOLANGIOPANCREATOGRAM  2019    Procedure: COMBINED ENDOSCOPIC RETROGRADE CHOLANGIOPANCREATOGRAPHY, Bile Duct Stent Exchange;  Surgeon: Omero Lawler MD;  [facility]     ENDOSCOPIC RETROGRADE CHOLANGIOPANCREATOGRAM N/A 2019    Procedure: ENDOSCOPIC RETROGRADE CHOLANGIOPANCREATOGRAPHY, WITH BILIARY STENT REMOVAL AND STONE EXTRACTION;  Surgeon: Omero Lawler MD;  Location: [facility]     HERNIORRHAPHY INGUINAL  2018    Procedure: Herniorrhaphy inguinal;  Surgeon: Emil Echevarria MD;  Location: [facility]     IR LIVER BIOPSY PERCUTANEOUS  2018     LAPAROTOMY EXPLORATORY      per the patient \"for infection in the LLQ\"      " TRANSPLANT LIVER RECIPIENT  DONOR N/A 2018    Procedure: TRANSPLANT LIVER RECIPIENT  DONOR;  Surgeon: Emil Echevarria MD;  Location: [facility] OR          Anesthesia Evaluation     . Pt has had prior anesthetic. Type: General (19; 1326; Mask Ventilation: Easy; Ease of Intubation: Easy; Airway Size: 7.5;  Cuffed;  Oral;  Blade Type: David;  Blade Size: 3;  Place by: carver;  Insertion Attempts: 1;  Secured at (cm)to lip: 22 cm;  Gr i view with cricoid pressure )    No history of anesthetic complications          ROS/MED HX    ENT/Pulmonary:  - neg pulmonary ROS   (+)tobacco use (4 year pack history, quit in 2018), Past use 0.2 packs/day  , . .   (-) asthma and COPD   Neurologic:  - neg neurologic ROS   (+)neuropathy - feet,    (-) seizures, CVA and TIA   Cardiovascular:     (+) hypertension----. : . . . :. dysrhythmias (post-op) a-fib and a-flutter, valvular problems/murmurs aortic sclerosis :. Previous cardiac testing Echodate:18results:Global and regional left ventricular function is normal with an EF of 60-65%.Right ventricular function, chamber size, wall motion, and thickness are  normal.Severe left atrial enlargement is present.  The inferior vena cava is normal.  No pericardial effusion is present.date: results:ECG reviewed date:19 results:Sinus bradycardia, minimal voltage criteria for LVH, may be normal variant  Non specific T wave abnormality      Cath date: 18 results:IMPRESSION:  1.  Mild non-obstructive CAD involving the LAD.  2.  Total Agatston score 996 placing the patient in the 97th  percentile when compared to age and gender matched control group.  3.  Please review Radiology report for incidental noncardiac findings  that will follow separately.            METS/Exercise Tolerance:  >4 METS   Hematologic:     (+) Anemia, -     (-) History of Transfusion   Musculoskeletal:  - neg musculoskeletal ROS       GI/Hepatic: Comment: S/p liver transplant on  12/22/2018.     (+) liver disease (History of ETOH induced cirrhosis and history of portal HTN),      (-) GERD   Renal/Genitourinary:     (+) BPH,       Endo:     (+) type II DM Last HgA1c: 7.3 date: 9/4/19 Not using insulin thyroid problem (Subclinical hypothyroididm. Not on supplements ) hypothyroidism, .      Psychiatric:  - neg psychiatric ROS       Infectious Disease:  - neg infectious disease ROS (+) TB (Followed by ID and treated ),       Malignancy:   (+) Malignancy (hepatocellular CA) History of Other and Prostate  Prostate CA Active status post, Other CA Remission status post Surgery         Other: Comment: Mr. Limon is a 64-year-old Chadian-speaking gentleman wih recent diagnosis of intermediate-risk prostate cancer.  He has liver transplantation for alcohol related cirrhosis complicated by hepatocellular carcinoma. Currently immunosuppressed   No Known Allergies   (+) no H/O Chronic Pain,no other significant disability   - neg other ROS                     PHYSICAL EXAM:   Mental Status/Neuro: A/A/O   Airway: Facies: Feasible  Mallampati: II  Mouth/Opening: Full  TM distance: < 6 cm  Neck ROM: Limited   Respiratory: Auscultation: CTAB     Resp. Rate: Normal     Resp. Effort: Normal      CV: Rhythm: Regular  Rate: Ramon  Heart: Normal Sounds  Edema: None   Comments:      Dental: Normal Dentition                LABS:  CBC:   Lab Results   Component Value Date    WBC 4.8 12/18/2019    WBC 4.7 12/16/2019    HGB 12.7 (L) 12/18/2019    HGB 12.2 (L) 12/16/2019    HCT 38.5 (L) 12/18/2019    HCT 36.7 (L) 12/16/2019     12/18/2019     12/16/2019     BMP:   Lab Results   Component Value Date     12/18/2019     12/16/2019    POTASSIUM 4.5 12/18/2019    POTASSIUM 4.2 12/16/2019    CHLORIDE 110 (H) 12/18/2019    CHLORIDE 107 12/16/2019    CO2 27 12/18/2019    CO2 27 12/16/2019    BUN 25 12/18/2019    BUN 23 12/16/2019    CR 0.77 12/18/2019    CR 0.80 12/16/2019     (H) 12/18/2019     " (H) 12/16/2019     COAGS:   Lab Results   Component Value Date    PTT 40 (H) 12/22/2018    INR 1.14 12/31/2018    FIBR 220 12/23/2018     POC:   Lab Results   Component Value Date     (H) 04/29/2019     OTHER:   Lab Results   Component Value Date    PH 7.41 12/23/2018    LACT 0.8 12/23/2018    A1C 7.3 (H) 09/04/2019    YELENA 8.4 (L) 12/18/2019    PHOS 2.7 12/18/2019    MAG 2.0 12/18/2019    ALBUMIN 3.6 12/18/2019    PROTTOTAL 7.0 12/18/2019    ALT 23 12/18/2019    AST 9 12/18/2019    ALKPHOS 178 (H) 12/18/2019    BILITOTAL 0.8 12/18/2019    LIPASE 42 (L) 03/04/2019    AMYLASE 44 03/04/2019    TSH 7.66 (H) 10/16/2019    T4 0.91 10/16/2019        Preop Vitals    BP Readings from Last 3 Encounters:   12/18/19 (!) 156/67   12/16/19 124/71   12/16/19 (!) 147/77    Pulse Readings from Last 3 Encounters:   12/18/19 51   12/16/19 58   12/16/19 54      Resp Readings from Last 3 Encounters:   12/16/19 14   06/05/19 16   05/13/19 18    SpO2 Readings from Last 3 Encounters:   12/18/19 98%   12/16/19 96%   09/04/19 98%      Temp Readings from Last 1 Encounters:   12/16/19 36.6  C (97.8  F) (Oral)    Ht Readings from Last 1 Encounters:   12/18/19 1.702 m (5' 7\")      Wt Readings from Last 1 Encounters:   12/19/19 84.1 kg (185 lb 8 oz)    Estimated body mass index is 29.05 kg/m  as calculated from the following:    Height as of 12/18/19: 1.702 m (5' 7\").    Weight as of 12/19/19: 84.1 kg (185 lb 8 oz).     LDA:  Pulmonary Artery Catheter Assessment - Single Lumen 12/22/18 0908 (Active)   Number of days: 376        Assessment:   ASA SCORE: 3    H&P: History and physical reviewed and following examination; no interval change.    NPO Status: NPO Appropriate     Plan:   Anes. Type:  General   Pre-Medication: Gabapentin; Acetaminophen   Induction:  IV (Standard)   Airway: ETT; Oral   Access/Monitoring: PIV; 2nd PIV; A-Line; FloTrac   Maintenance: Balanced     Postop Plan:   Postop Pain: Opioids  Postop Sedation/Airway: Not " planned  Disposition: Inpatient/Admit     PONV Management:   Adult Risk Factors:, Postop Opioids   Prevention: Ondansetron, Dexamethasone     CONSENT: Via ; Direct conversation   Plan and risks discussed with: Patient   Blood Products: Consented (ALL Blood Products)                   Chivo Ward MD

## 2020-01-04 LAB
ANION GAP SERPL CALCULATED.3IONS-SCNC: 8 MMOL/L (ref 3–14)
BUN SERPL-MCNC: 23 MG/DL (ref 7–30)
CALCIUM SERPL-MCNC: 7.4 MG/DL (ref 8.5–10.1)
CHLORIDE SERPL-SCNC: 105 MMOL/L (ref 94–109)
CO2 SERPL-SCNC: 23 MMOL/L (ref 20–32)
CREAT FLD-MCNC: 0.8 MG/DL
CREAT SERPL-MCNC: 0.74 MG/DL (ref 0.66–1.25)
ERYTHROCYTE [DISTWIDTH] IN BLOOD BY AUTOMATED COUNT: 12.8 % (ref 10–15)
GFR SERPL CREATININE-BSD FRML MDRD: >90 ML/MIN/{1.73_M2}
GLUCOSE BLDC GLUCOMTR-MCNC: 115 MG/DL (ref 70–99)
GLUCOSE BLDC GLUCOMTR-MCNC: 150 MG/DL (ref 70–99)
GLUCOSE BLDC GLUCOMTR-MCNC: 161 MG/DL (ref 70–99)
GLUCOSE BLDC GLUCOMTR-MCNC: 176 MG/DL (ref 70–99)
GLUCOSE BLDC GLUCOMTR-MCNC: 225 MG/DL (ref 70–99)
GLUCOSE BLDC GLUCOMTR-MCNC: 245 MG/DL (ref 70–99)
GLUCOSE SERPL-MCNC: 206 MG/DL (ref 70–99)
HCT VFR BLD AUTO: 33.7 % (ref 40–53)
HGB BLD-MCNC: 11 G/DL (ref 13.3–17.7)
MCH RBC QN AUTO: 28.9 PG (ref 26.5–33)
MCHC RBC AUTO-ENTMCNC: 32.6 G/DL (ref 31.5–36.5)
MCV RBC AUTO: 89 FL (ref 78–100)
PLATELET # BLD AUTO: 177 10E9/L (ref 150–450)
POTASSIUM SERPL-SCNC: 4.9 MMOL/L (ref 3.4–5.3)
RBC # BLD AUTO: 3.81 10E12/L (ref 4.4–5.9)
SODIUM SERPL-SCNC: 136 MMOL/L (ref 133–144)
SPECIMEN SOURCE FLD: NORMAL
WBC # BLD AUTO: 7.4 10E9/L (ref 4–11)

## 2020-01-04 PROCEDURE — 25000132 ZZH RX MED GY IP 250 OP 250 PS 637: Performed by: STUDENT IN AN ORGANIZED HEALTH CARE EDUCATION/TRAINING PROGRAM

## 2020-01-04 PROCEDURE — 25000131 ZZH RX MED GY IP 250 OP 636 PS 637: Performed by: STUDENT IN AN ORGANIZED HEALTH CARE EDUCATION/TRAINING PROGRAM

## 2020-01-04 PROCEDURE — 36415 COLL VENOUS BLD VENIPUNCTURE: CPT | Performed by: STUDENT IN AN ORGANIZED HEALTH CARE EDUCATION/TRAINING PROGRAM

## 2020-01-04 PROCEDURE — 80048 BASIC METABOLIC PNL TOTAL CA: CPT | Performed by: STUDENT IN AN ORGANIZED HEALTH CARE EDUCATION/TRAINING PROGRAM

## 2020-01-04 PROCEDURE — 25800030 ZZH RX IP 258 OP 636: Performed by: STUDENT IN AN ORGANIZED HEALTH CARE EDUCATION/TRAINING PROGRAM

## 2020-01-04 PROCEDURE — 82962 GLUCOSE BLOOD TEST: CPT

## 2020-01-04 PROCEDURE — 25000128 H RX IP 250 OP 636: Performed by: STUDENT IN AN ORGANIZED HEALTH CARE EDUCATION/TRAINING PROGRAM

## 2020-01-04 PROCEDURE — 85027 COMPLETE CBC AUTOMATED: CPT | Performed by: STUDENT IN AN ORGANIZED HEALTH CARE EDUCATION/TRAINING PROGRAM

## 2020-01-04 PROCEDURE — 25000131 ZZH RX MED GY IP 250 OP 636 PS 637: Performed by: UROLOGY

## 2020-01-04 PROCEDURE — 82570 ASSAY OF URINE CREATININE: CPT | Performed by: STUDENT IN AN ORGANIZED HEALTH CARE EDUCATION/TRAINING PROGRAM

## 2020-01-04 RX ORDER — BISACODYL 10 MG
10 SUPPOSITORY, RECTAL RECTAL DAILY
Qty: 10 SUPPOSITORY | Refills: 1 | Status: SHIPPED | OUTPATIENT
Start: 2020-01-05 | End: 2022-03-02

## 2020-01-04 RX ORDER — MAGNESIUM CARB/ALUMINUM HYDROX 105-160MG
30 TABLET,CHEWABLE ORAL DAILY
Qty: 500 ML | Refills: 1 | Status: SHIPPED | OUTPATIENT
Start: 2020-01-05 | End: 2022-03-02

## 2020-01-04 RX ORDER — SIMETHICONE 80 MG
80 TABLET,CHEWABLE ORAL EVERY 6 HOURS PRN
Status: DISCONTINUED | OUTPATIENT
Start: 2020-01-04 | End: 2020-01-05 | Stop reason: HOSPADM

## 2020-01-04 RX ORDER — TACROLIMUS 1 MG/1
3 CAPSULE ORAL
Status: DISCONTINUED | OUTPATIENT
Start: 2020-01-04 | End: 2020-01-05 | Stop reason: HOSPADM

## 2020-01-04 RX ORDER — OXYCODONE HYDROCHLORIDE 5 MG/1
5 TABLET ORAL EVERY 6 HOURS PRN
Qty: 20 TABLET | Refills: 0 | Status: SHIPPED | OUTPATIENT
Start: 2020-01-04 | End: 2020-04-06

## 2020-01-04 RX ORDER — CIPROFLOXACIN 500 MG/1
500 TABLET, FILM COATED ORAL 2 TIMES DAILY
Qty: 1 TABLET | Refills: 0 | Status: SHIPPED | OUTPATIENT
Start: 2020-01-04 | End: 2020-01-13

## 2020-01-04 RX ORDER — POLYETHYLENE GLYCOL 3350 17 G/17G
1 POWDER, FOR SOLUTION ORAL DAILY PRN
Qty: 12 PACKET | Refills: 1 | Status: SHIPPED | OUTPATIENT
Start: 2020-01-04 | End: 2020-01-04

## 2020-01-04 RX ADMIN — MAGNESIUM HYDROXIDE 30 ML: 400 SUSPENSION ORAL at 09:22

## 2020-01-04 RX ADMIN — OXYCODONE HYDROCHLORIDE 5 MG: 5 TABLET ORAL at 06:29

## 2020-01-04 RX ADMIN — MINERAL OIL 30 ML: 15 OIL ORAL; TOPICAL at 09:22

## 2020-01-04 RX ADMIN — CEFAZOLIN SODIUM 2 G: 2 INJECTION, SOLUTION INTRAVENOUS at 02:14

## 2020-01-04 RX ADMIN — CEFAZOLIN SODIUM 2 G: 2 INJECTION, SOLUTION INTRAVENOUS at 11:56

## 2020-01-04 RX ADMIN — INSULIN ASPART 1 UNITS: 100 INJECTION, SOLUTION INTRAVENOUS; SUBCUTANEOUS at 07:06

## 2020-01-04 RX ADMIN — BISACODYL 10 MG: 10 SUPPOSITORY RECTAL at 09:28

## 2020-01-04 RX ADMIN — SODIUM CHLORIDE: 9 INJECTION, SOLUTION INTRAVENOUS at 11:56

## 2020-01-04 RX ADMIN — OXYCODONE HYDROCHLORIDE 5 MG: 5 TABLET ORAL at 03:39

## 2020-01-04 RX ADMIN — TACROLIMUS 3 MG: 1 CAPSULE ORAL at 09:21

## 2020-01-04 RX ADMIN — INSULIN ASPART 1 UNITS: 100 INJECTION, SOLUTION INTRAVENOUS; SUBCUTANEOUS at 16:15

## 2020-01-04 RX ADMIN — TACROLIMUS 3 MG: 1 CAPSULE ORAL at 20:31

## 2020-01-04 RX ADMIN — METOPROLOL TARTRATE 75 MG: 50 TABLET, FILM COATED ORAL at 20:32

## 2020-01-04 RX ADMIN — INSULIN ASPART 2 UNITS: 100 INJECTION, SOLUTION INTRAVENOUS; SUBCUTANEOUS at 02:11

## 2020-01-04 RX ADMIN — INSULIN ASPART 3 UNITS: 100 INJECTION, SOLUTION INTRAVENOUS; SUBCUTANEOUS at 20:32

## 2020-01-04 RX ADMIN — METOPROLOL TARTRATE 75 MG: 50 TABLET, FILM COATED ORAL at 12:40

## 2020-01-04 RX ADMIN — OXYCODONE HYDROCHLORIDE 5 MG: 5 TABLET ORAL at 16:14

## 2020-01-04 NOTE — ANESTHESIA CARE TRANSFER NOTE
Patient: Loy Limon    Procedure(s):  Robot-assisted laparoscopic radical prostatectomy, Bladder biopsy    Diagnosis: Prostate cancer (H) [C61]  Diagnosis Additional Information: No value filed.    Anesthesia Type:   General     Note:    Patient transferred to:PACU  Handoff Report: Identifed the Patient, Identified the Reponsible Provider, Reviewed the pertinent medical history, Discussed the surgical course, Reviewed Intra-OP anesthesia mangement and issues during anesthesia, Set expectations for post-procedure period and Allowed opportunity for questions and acknowledgement of understanding      Vitals: (Last set prior to Anesthesia Care Transfer)    CRNA VITALS  1/3/2020 1932 - 1/3/2020 2014      1/3/2020             Pulse:  85    ART BP:  (!) 209/65    ART Mean:  125    SpO2:  98 %                Electronically Signed By: EZRA Bello CRNA  January 3, 2020  8:14 PM

## 2020-01-04 NOTE — ANESTHESIA POSTPROCEDURE EVALUATION
Anesthesia POST Procedure Evaluation    Patient: Loy Limon   MRN:     [id number] Gender:   male   Age:    64 year old :      1955        Preoperative Diagnosis: Prostate cancer (H) [C61]   Procedure(s):  Robot-assisted laparoscopic radical prostatectomy, Bladder biopsy   Postop Comments: No value filed.       Anesthesia Type:  Not documented  General    Reportable Event: NO     PAIN: Uncomplicated   Sign Out status: Comfortable, Well controlled pain     PONV: No PONV   Sign Out status:  No Nausea or Vomiting     Neuro/Psych: Uneventful perioperative course   Sign Out Status: Preoperative baseline; Age appropriate mentation     Airway/Resp.: Uneventful perioperative course   Sign Out Status: Non labored breathing, age appropriate RR; Resp. Status within EXPECTED Parameters     CV: Uneventful perioperative course   Sign Out status: Appropriate BP and perfusion indices; Appropriate HR/Rhythm     Disposition:   Sign Out in:  PACU  Disposition:  Floor  Recovery Course: Uneventful  Follow-Up: Not required     Comments/Narrative:  Patient doing well post-operatively.  No significant issues.  Hemodynamically stable, pain well controlled, nausea well controlled.  Stable for discharge from the PACU             Last Anesthesia Record Vitals:  CRNA VITALS  1/3/2020 193 - 1/3/2020 2032      1/3/2020             Pulse:  85    ART BP:  (!) 209/65    ART Mean:  125    SpO2:  98 %          Last PACU Vitals:  Vitals Value Taken Time   /71 1/3/2020 11:00 PM   Temp 36.6  C (97.9  F) 1/3/2020 10:00 PM   Pulse 81 1/3/2020  9:40 PM   Resp 12 1/3/2020 11:00 PM   SpO2 93 % 1/3/2020 11:00 PM   Temp src     NIBP     Pulse     SpO2     Resp     Temp     Ht Rate     Temp 2     Vitals shown include unvalidated device data.      Electronically Signed By: Rolly Euceda MD, January 3, 2020, 11:56 PM

## 2020-01-04 NOTE — PROGRESS NOTES
"Urology  Progress Note  01/04/2020    -WALLACE  -AF, HTN w/ -140s overnight, on room air  -Having gas pains  -He is passing flatus but no BM as of yet  -Ambulating  -Had jello and ice cream last night with no N/V    Exam  BP (!) 146/64 (BP Location: Left arm)   Pulse 81   Temp 96.6  F (35.9  C) (Oral)   Resp 13   Ht 1.702 m (5' 7.01\")   Wt 81.9 kg (180 lb 8.9 oz)   SpO2 94%   BMI 28.27 kg/m    No acute distress  Unlabored breathing  Abdomen soft, moderately distended, appropriately tender  KARINE serosanguinous  Cota with clear yellow urine in tubing    I/O's (last 24/since midnight)  /1050  KARINE 40/45    Labs  1/4 (1/3)  WBC 7.4 (7.8)  Hgb 11 (11.9)  Cr 0.74 (0.79)  KARINE Cr 0.8    Assessment/Plan  64 year old male w/ hx of liver txp who is now POD#1 s/p RALP and bladder biopsy for prostate cancer.    Neuro: prn IV dilaudid, prn oxy for pain control (no tylenol due to liver txp hx)  CV: HDS. HTN noted. PTA metoprolol.  Pulm: Supplemental O2, wean as able; incentive spirometry while awake  FEN/GI: FLD, MIVF @ 100/hr. Bowel regimen. Simethicone prn.  Endo: sliding scale insulin  : Continue cota. Monitor UOP. KARINE Cr WNL.  Heme/ID: Hgb pending. Monitor for leukocytosis and fevers. [place]-op ancef.  Txp: Hx of liver transplant. PTA tacrolimus.  Activity: Up ad shawn  PPx: SCDs   Dispo: Possible discharge home today    Seen and examined with Dr. Anton. Will discuss with Dr. Casiano.    Rob Parker MD  Urology Resident     Contacting the Urology Team     Please use the following job codes to reach the Urology Team. Note that you must use an in house phone and that job codes cannot receive text pages.     On weekdays, dial [phone] (or star-star-star [phone] on the new [email or web address]) then [phone] to reach the Adult Urology resident or PA on call    On weekdays, dial 893 (or star-star-star 777 on the new [employer] telephones) then [phone] to reach the Pediatric Urology resident    On weeknights and weekends, dial 893 (or " star-star-star 777 on the new [employer] telephones) then [phone] to reach the Urology resident on call (for both Adult and Pediatrics)

## 2020-01-04 NOTE — PLAN OF CARE
"  VS: /71 (BP Location: Left arm)   Pulse 81   Temp 97.9  F (36.6  C) (Oral)   Resp 12   Ht 1.702 m (5' 7.01\")   Wt 81.9 kg (180 lb 8.9 oz)   SpO2 93%   BMI 28.27 kg/m       O2: 93%, 1L   Output: 100mL   Last BM: unknown   Activity: Up w/ assist x1   Skin: WDL   Pain: tolerable   CMS: WDL   Dressing: Reinforced both KARINE and IV   Diet: Clear through breakfast   LDA: PIV R forearm infusing   Equipment: IV   Plan:    Additional Info:        "

## 2020-01-04 NOTE — PLAN OF CARE
VS: VSS and capno stable overnight   O2: Stable on RA   Output: Cota catheter in place. Large output of 1050 mL tea colored urine overnight   Last BM: Pt was unable to say what day but it has been a few days. Pt states he feels as though he needs to have a BM   Activity: Up SBA. Ambulated to bathroom and in marie overnight   Skin: Intact other than incision, KARINE drain and cota. Mepilex noted on coccyx   Pain: Controlled with oral oxycodone q3h prn and ambulation    CMS: Intact   Dressing: Mepilex CDI, KARINE dressing with increasing amount drainage on dressing    Diet: Clear liquid for breakfast    LDA: PIV R forearm with triple lumen extender    Equipment: IV pump and pole, CAPNO   Plan: Continue to monitor throughout shift and intervene when necessary   Additional Info: Pt is Yoruba speaking and requires an interpretor for all education and consults

## 2020-01-04 NOTE — OR NURSING
PACU to Inpatient Nursing Handoff    Patient Loy Limon is a 64 year old male who speaks Tuvaluan.   Procedure Procedure(s):  Robot-assisted laparoscopic radical prostatectomy, Bladder biopsy   Surgeon(s) Primary: Kenrick Casiano MD  Resident - Assisting: Tiffanie Kelley MD  Fellow - Assisting: Marcos Leal MD     No Known Allergies    Isolation  No active isolations     Past Medical History   has a past medical history of Anemia, Biliary stricture of transplanted liver (H) (12/28/2018), BPH (benign prostatic hyperplasia), Cancer (H), Cirrhosis of liver (H) (5/8/2018), Diabetes (H), Enlarged prostate, Hypothyroidism, Inguinal hernia, Liver lesion (5/8/2018), Liver transplanted (H) (12/22/2018), Portal vein thrombosis, Postoperative atrial fibrillation (H) (12/25/2018), Prostate cancer (H), and TB lung, latent.    Anesthesia General   Dermatome Level     Preop Meds gabapentin (Neurontin) - time given: 1128   Nerve block Not applicable   Intraop Meds dexamethasone (Decadron)  fentanyl (Sublimaze): 300 mcg total  hydromorphone (Dilaudid): 0.5 mg total  ondansetron (Zofran): last given at 1932  labetalol 10 mg at 1928   Local Meds No   Antibiotics cefazolin (Ancef) - last given at 1831     Pain Patient Currently in Pain: sleeping: patient not able to self report  Comfort: comfortably manageable  Pain Control: partially effective   PACU meds  fentanyl (Sublimaze): 50 mcg (total dose) last given at 2114   hydromorphone (Dilaudid): 0.5 mg (total dose) last given at 2131   hydralazine (Apresoline): 15 mg (total dose) last given at 2132    PCA / epidural No   Capnography     Telemetry ECG Rhythm: Sinus rhythm   Inpatient Telemetry Monitor Ordered? No        Labs Glucose Lab Results   Component Value Date     01/03/2020       Hgb Lab Results   Component Value Date    HGB 11.9 01/03/2020       INR Lab Results   Component Value Date    INR 1.14 12/31/2018      PACU Imaging Not applicable      Wound/Incision Incision/Surgical Site with Packing 01/03/20 Midline Abdomen (Active)   Incision Assessment WDL 1/3/2020  8:10 PM   Drainage Amount none 1/3/2020  8:10 PM   Number of days: 0       Incision/Surgical Site 01/03/20 Midline Abdomen (Active)   Incision Assessment WDL 1/3/2020  9:03 PM   Closure Sutures 1/3/2020  9:03 PM   Incision Drainage Amount None 1/3/2020  9:03 PM   Dressing Intervention Open to air / No Dressing 1/3/2020  9:03 PM   Number of days: 0       Incision/Surgical Site 01/03/20 Right;Lateral Abdomen (Active)   Incision Assessment WD except;Drainage 1/3/2020  9:03 PM   Closure Sutures 1/3/2020  9:03 PM   Incision Drainage Amount Small 1/3/2020  9:03 PM   Drainage Description Serosanguinous 1/3/2020  9:03 PM   Dressing Intervention Dressing reinforced 1/3/2020  9:03 PM   Number of days: 0       Incision/Surgical Site 01/03/20 Right Abdomen (Active)   Incision Assessment UTV 1/3/2020  9:03 PM   Dressing Intervention Clean, dry, intact 1/3/2020  9:03 PM   Number of days: 0       Incision/Surgical Site 01/03/20 Left;Lateral Abdomen (Active)   Incision Assessment WDL 1/3/2020  9:03 PM   Dressing Intervention Open to air / No Dressing 1/3/2020  9:03 PM   Number of days: 0       Incision/Surgical Site 01/03/20 Left Abdomen (Active)   Incision Assessment WDL 1/3/2020  9:03 PM   Dressing Intervention Open to air / No Dressing 1/3/2020  9:03 PM   Number of days: 0      CMS        Equipment Not applicable   Other LDA       IV Access Peripheral IV 01/03/20 Right Lower forearm (Active)   Site Assessment WDL 1/3/2020  8:25 PM   Line Status Infusing 1/3/2020  8:25 PM   Phlebitis Scale 0-->no symptoms 1/3/2020  8:25 PM   Infiltration Scale 0 1/3/2020  8:25 PM   Number of days: 0       Pulmonary Artery Catheter Assessment - Single Lumen 12/22/18 0908 (Active)   Number of days: 377      Blood Products Not applicable    mL   Intake/Output Date 01/03/20 0700 - 01/04/20 0659   Shift 9206-6558  2874-9602 7924-5481 24 Hour Total   INTAKE   I.V.  1600  1600   Shift Total(mL/kg)  1600(19.54)  1600(19.54)   OUTPUT   Urine  100  100   Shift Total(mL/kg)  100(1.22)  100(1.22)   Weight (kg) 81.9 81.9 81.9 81.9      Drains / Cronin Closed/Suction Drain 1 Right RLQ Bulb 19 Guatemalan (Active)   Site Description UTV 1/3/2020  8:04 PM   Dressing Status Normal: Clean, Dry & Intact 1/3/2020  8:04 PM   Drainage Appearance Bloody/Bright Red 1/3/2020  8:04 PM   Status To bulb suction 1/3/2020  8:04 PM   Number of days: 0       Urethral Catheter 20 fr (Active)   Collection Container Standard;Patent 1/3/2020  8:04 PM   Securement Method Tape 1/3/2020  8:04 PM   Number of days: 0      Time of void PreOp Void Prior to Procedure: 0930 (01/03/20 1103)    PostOp      Diapered? No   Bladder Scan     PO    tolerating sips     Vitals    B/P: (!) 179/79  T: 98.6  F (37  C)    Temp src: Oral  P:  Pulse: 72 (01/03/20 2100)    Heart Rate: 73 (01/03/20 2100)     R: 12  O2:  SpO2: 98 %    O2 Device: Simple face mask (01/03/20 2050)    Oxygen Delivery: 6 LPM (01/03/20 2100)         Family/support present significant other and daughters (can speak english)\   Patient belongings     Patient transported on cart and air mat   DC meds/scripts (obs/outpt) Not applicable   Inpatient Pain Meds Released? Yes       Special needs/considerations None   Tasks needing completion None       Audelia Vargas, DALLIN  ASCOM [id number]

## 2020-01-04 NOTE — PLAN OF CARE
VS:       Pt A/O X 4. Afebrile. VSS. Lungs-clear bilaterally with both anterior and posterior. IS encouraged. Denies nausea, shortness of breath, and chest pain. Pt is Tajik speaking.      Output:       Bowels-active in all four quadrants. Pt's abdomen distended with gas, pt is passing gas and had small BM this shift after bowel medications and suppository this am. Cota catheter in place that is patent and draining to gravity with good output. Pt will discharge with cota catheter in place.      Activity:       Pt up in room, in hallways, and to bathroom with assist of standby, gait is steady.     Skin:   Incisions to abdomen, open to air. KARINE drain intact and had total output of 40 mL this shift. 20 mL emptied this am and sent for lab, then additional 20 mL emptied at 1500. KARINE drain insertion site leaking, dressing around site changed x 1 this shift. Drain insertion site continues to leak small amounts of serosanguinous drainage.      Pain:       Has pain in the abdomen and declined pain interventions this shift. Pt describes pain as gas pain, and continues to pass gas.      CMS:       CMS and Neuro's are intact. Denies numbness and tingling in all extremities.      Dressing:       KARINE drain site dressing intact and changed this shift, leaking at insertion site.       Diet:       Pt is on a full liquid diet and appetite was good this shift.       LDA:       PIV is patent in the right arm and infusing.      Equipment:       Pt declines PCDs, up ad shawn. Pt ambulates frequently. Bilateral heels are elevated off the bed.     Plan:       Pt is able to make needs known and the call light is within the pt's reach. Continue to monitor.       Additional Info:       Urology to come evaluate pt this evening, possible discharge.

## 2020-01-04 NOTE — BRIEF OP NOTE
[facility], [place]    Brief Operative Note    Pre-operative diagnosis: Prostate cancer (H) [C61]  Post-operative diagnosis Same as pre-operative diagnosis    Procedure: Procedure(s):  Robot-assisted laparoscopic radical prostatectomy, Bladder biopsy  Surgeon: Surgeon(s) and Role:     * Kenrick Casiano MD - Primary     * Tiffanie Kelley MD - Resident - Assisting     * Marcos Leal MD - Fellow - Assisting  Anesthesia: General   Estimated blood loss: 200 ml  Drains: KARINE drain, 20F cota  Specimens:   ID Type Source Tests Collected by Time Destination   A : Bladder Lesion Tissue Bladder SURGICAL PATHOLOGY EXAM Kenrick Casiano MD 1/3/2020  6:24 PM    B : Prostate Tissue Prostate SURGICAL PATHOLOGY EXAM Kenrikc Casiano MD 1/3/2020  7:19 PM      Findings:   unremarkable procedure.  Complications: None.  Implants: * No implants in log *

## 2020-01-04 NOTE — OP NOTE
Procedure Date: 01/03/2020      PREOPERATIVE DIAGNOSIS:  Prostate cancer.      POSTOPERATIVE DIAGNOSIS:  Prostate cancer.      PROCEDURE:  Robotic-assisted laparoscopic radical prostatectomy and bladder biopsy.      INDICATIONS:  Mr. Limon is a 64-year-old Maltese-speaking gentleman with a history of a liver transplant who is followed in our clinic for recent diagnosis of prostate cancer.  After discussion of risks, benefits and alternatives, the patient presented today for robotic-assisted laparoscopic radical prostatectomy.      DESCRIPTION OF PROCEDURE:  After informed consent was obtained, the patient was brought to the operating room where he was administered general endotracheal tube anesthesia.  After suitable level of anesthesia was attained, he was placed in lithotomy position with all pressure points padded.  He was administered preoperative antibiotics.  He was prepped and draped in standard sterile fashion.  Next, a 16-Norwegian Cronin catheter was placed in the patient's bladder and the balloon inflated with 10 mL of water.  We then entered the patient's umbilicus in the left upper quadrant due to previous surgeries that he had had.  After confirmatory drop test, the patient's abdomen was insufflated to 15 cm of water pressure worth of CO2.  We then placed a robotic port in the left lower quadrant and used a 5 mm laparoscopic camera through this port to inspect the abdomen.  There was no injury from placement of the Veress needle or our first port.  There were a few adhesions of what appeared to be omentum or fat superiorly, but no adhesions were noted in the pelvis.  We placed our remaining ports in an inverted umbrella in standard fashion under direct vision.  We then placed the patient in Trendelenburg and docked the robot.  We incised the posterior peritoneum and identified seminal vesicles and vasa deferentia bilaterally.  These were sequentially dissected out.  The vasa were clipped and transected.   Once all 4 structures were dissected out, Denonvilliers fascia was opened up underneath the prostate and the space between the prostate and the rectum was developed down toward the apex.  We then left 2 Surgicels in this location.  We dropped the bladder by making parallel incisions lateral to the medial umbilical ligaments bilaterally and connecting these cephalad.  This dissection was somewhat complicated due to the patient's previous abdominal surgery and previous open lower midline incision.  The bladder was stuck to the anterior abdominal wall and was difficult to find the correct plane, but ultimately we were able to do so and developed the space of Retzius down to the pubic bone.  We then defatted the prostate and the endopelvic fascia.  The superficial dorsal vein was controlled with electrocautery.  We then incised the endopelvic fascia bilaterally and carefully swept the pelvic floor muscles off of the prostate.  The puboprostatic ligaments were taken down sharply.  We then controlled the dorsal vein with a stitch of 0 Vicryl.  Next, we then incised the anterior bladder neck until we entered the bladder.  Subsequent inspection demonstrated no median lobe.  The posterior bladder neck was then transected.  During this procedure, it was noted that there was a cystic appearing lesion in the bladder.  It did not appear to be a papillary lesion.  The decision was made to biopsy this lesion later on in the procedure.  We transected the posterior bladder neck and developed the plane between the prostate and the bladder until we came back through the defect into the Denonvilliers fascia, and the seminal vesicles, vasa deferentia were brought up through this defect.  The Surgicels were then removed.  The pedicles were taken with clips and cutting.  Neurovascular bundles were resected bilaterally due to the patient's disease status and the fact that he has severe preexisting erectile dysfunction.  The posterior  attachments were taken down sharply, resulting in just the apical attachments remaining.  The dorsal venous complex was then transected with electrocautery down to the level of the urethra, which was then largely cold cut.  Posterior striated sphincter fibers were then  with electrocautery, resulting in the prostate being free and was placed in the left lower quadrant.  We then established excellent hemostasis.  We then turned our attention to the bladder.  The lesion in the bladder was biopsied and the base of the resection was cauterized.  Next, the anastomosis was completed with 2 stitches of 3-0 Monocryl tied together in a standard running fashion.  We then placed a 20-Thai Cronin catheter into the bladder and the balloon inflated with 13 mL of water.  Irrigation demonstrated no visible leak.  The prostate was then placed in an EndoCatch bag and a 19-Thai Bal drain was placed in the right lateral robotic port and stitched into place with 2-0 nylon.  The robot was then undocked and the patient was brought out of Trendelenburg.  The supraumbilical port site was enlarged.  The prostate was brought out of this location.  The fascia in this location was then closed with interrupted stitches of interrupted figure-of-eight stitches of 0 Vicryl.  All skin was then closed with 4-0 Monocryl in a subcuticular fashion.  Marcaine 0.25% was used for local anesthesia.  All wounds were dressed with skin glue.  The drain was connected to bulb suction and the Cronin catheter was then left to gravity drainage and secured to the patient's leg with heavy cloth tape.  The patient was then awakened, extubated, and brought to the recovery room in stable condition.      SURGEON:  Kenrick Casiano MD      ASSISTANT:  Marcos Leal MD and Dr. Kira Kelley.        ESTIMATED BLOOD LOSS:  200 mL.      COMPLICATIONS:  There were no immediate complications.      SPECIMENS:  Includes:   1.  Prostate, seminal vesicles and vasa  deferentia.   2.  Bladder lesion.         TEE DAWN MD             D: 2020   T: 2020   MT: QASIM      Name:     DONNA BIGGS   MRN:      [id number]        Account:        [id number]   :      1955           Procedure Date: 2020      Document: [id number]

## 2020-01-05 ENCOUNTER — OFFICE VISIT (OUTPATIENT)
Dept: INTERPRETER SERVICES | Facility: CLINIC | Age: 65
End: 2020-01-05
Payer: COMMERCIAL

## 2020-01-05 VITALS
HEIGHT: 67 IN | SYSTOLIC BLOOD PRESSURE: 158 MMHG | OXYGEN SATURATION: 100 % | TEMPERATURE: 98.6 F | DIASTOLIC BLOOD PRESSURE: 62 MMHG | RESPIRATION RATE: 16 BRPM | BODY MASS INDEX: 28.34 KG/M2 | WEIGHT: 180.56 LBS | HEART RATE: 81 BPM

## 2020-01-05 LAB
ANION GAP SERPL CALCULATED.3IONS-SCNC: 5 MMOL/L (ref 3–14)
BUN SERPL-MCNC: 16 MG/DL (ref 7–30)
CALCIUM SERPL-MCNC: 7.8 MG/DL (ref 8.5–10.1)
CHLORIDE SERPL-SCNC: 110 MMOL/L (ref 94–109)
CO2 SERPL-SCNC: 27 MMOL/L (ref 20–32)
CREAT SERPL-MCNC: 0.67 MG/DL (ref 0.66–1.25)
ERYTHROCYTE [DISTWIDTH] IN BLOOD BY AUTOMATED COUNT: 12.8 % (ref 10–15)
GFR SERPL CREATININE-BSD FRML MDRD: >90 ML/MIN/{1.73_M2}
GLUCOSE BLDC GLUCOMTR-MCNC: 132 MG/DL (ref 70–99)
GLUCOSE BLDC GLUCOMTR-MCNC: 146 MG/DL (ref 70–99)
GLUCOSE SERPL-MCNC: 149 MG/DL (ref 70–99)
HCT VFR BLD AUTO: 35.5 % (ref 40–53)
HGB BLD-MCNC: 11.6 G/DL (ref 13.3–17.7)
MCH RBC QN AUTO: 29.1 PG (ref 26.5–33)
MCHC RBC AUTO-ENTMCNC: 32.7 G/DL (ref 31.5–36.5)
MCV RBC AUTO: 89 FL (ref 78–100)
PLATELET # BLD AUTO: 164 10E9/L (ref 150–450)
POTASSIUM SERPL-SCNC: 4.5 MMOL/L (ref 3.4–5.3)
RBC # BLD AUTO: 3.98 10E12/L (ref 4.4–5.9)
SODIUM SERPL-SCNC: 142 MMOL/L (ref 133–144)
WBC # BLD AUTO: 5.1 10E9/L (ref 4–11)

## 2020-01-05 PROCEDURE — 25000131 ZZH RX MED GY IP 250 OP 636 PS 637: Performed by: UROLOGY

## 2020-01-05 PROCEDURE — T1013 SIGN LANG/ORAL INTERPRETER: HCPCS | Mod: U3

## 2020-01-05 PROCEDURE — 25000132 ZZH RX MED GY IP 250 OP 250 PS 637: Performed by: STUDENT IN AN ORGANIZED HEALTH CARE EDUCATION/TRAINING PROGRAM

## 2020-01-05 PROCEDURE — 36415 COLL VENOUS BLD VENIPUNCTURE: CPT | Performed by: STUDENT IN AN ORGANIZED HEALTH CARE EDUCATION/TRAINING PROGRAM

## 2020-01-05 PROCEDURE — 85027 COMPLETE CBC AUTOMATED: CPT | Performed by: STUDENT IN AN ORGANIZED HEALTH CARE EDUCATION/TRAINING PROGRAM

## 2020-01-05 PROCEDURE — 82962 GLUCOSE BLOOD TEST: CPT

## 2020-01-05 PROCEDURE — 80048 BASIC METABOLIC PNL TOTAL CA: CPT | Performed by: STUDENT IN AN ORGANIZED HEALTH CARE EDUCATION/TRAINING PROGRAM

## 2020-01-05 RX ORDER — AMLODIPINE BESYLATE 10 MG/1
10 TABLET ORAL EVERY MORNING
Status: DISCONTINUED | OUTPATIENT
Start: 2020-01-05 | End: 2020-01-05 | Stop reason: HOSPADM

## 2020-01-05 RX ADMIN — TACROLIMUS 3 MG: 1 CAPSULE ORAL at 08:32

## 2020-01-05 RX ADMIN — MAGNESIUM HYDROXIDE 30 ML: 400 SUSPENSION ORAL at 08:32

## 2020-01-05 RX ADMIN — METOPROLOL TARTRATE 75 MG: 50 TABLET, FILM COATED ORAL at 08:31

## 2020-01-05 RX ADMIN — INSULIN ASPART 1 UNITS: 100 INJECTION, SOLUTION INTRAVENOUS; SUBCUTANEOUS at 06:46

## 2020-01-05 RX ADMIN — AMLODIPINE BESYLATE 10 MG: 10 TABLET ORAL at 08:32

## 2020-01-05 RX ADMIN — MINERAL OIL 30 ML: 15 OIL ORAL; TOPICAL at 08:32

## 2020-01-05 NOTE — PLAN OF CARE
"  VS: BP (!) 163/47 (BP Location: Left arm)   Pulse 81   Temp 99  F (37.2  C) (Oral)   Resp 16   Ht 1.702 m (5' 7.01\")   Wt 81.9 kg (180 lb 8.9 oz)   SpO2 97%   BMI 28.27 kg/m     O2: RA, sats > 90%   Output: Cronin cath. Cath care completed   Last BM: Small BM this morning per day RN report, patient denies having a BM today.   Activity: Up w/ SBA, will usually call but will also get up independently. Steady gait.    Skin: Incisions on abdomen WDL. Drain to bulb suction.   Pain: Pain in abdomen, a discomfort/gas pain that is well controlled with oxycodone.    CMS: Intact. Denies n/t. AO x 4.   Dressing: Dressing on KARINE changed is currently CDI.   Diet: Regular, advanced this evening and was tolerated well by the patient.   LDA: PIV removed   Equipment: IV pump and pole, personal belongings.   Plan: Plan to discharge home tomorrow on current bowel regimen and pain management medications. Patient will need catheter care education prior to discharge.   Additional Info:      "

## 2020-01-05 NOTE — PROGRESS NOTES
"Urology  Progress Note  01/05/2020    -NAEON  -Passing flatus  -Had one small BM yesterday morning per nursing  -Tolerating regular diet  -Denies nausea or vomiting  -Ambulating    Exam  BP (!) 171/80 (BP Location: Right arm)   Pulse 81   Temp 98.9  F (37.2  C) (Oral)   Resp 16   Ht 1.702 m (5' 7.01\")   Wt 81.9 kg (180 lb 8.9 oz)   SpO2 97%   BMI 28.27 kg/m    No acute distress  Unlabored breathing  Abdomen soft, moderately distended, appropriately tender  KARINE serosanguinous  Cota with clear yellow urine in tubing    I/O's (last 24/since midnight)  UOP 4900/900  KARINE 115/50    Labs  1/5 (1/4)  WBC 5.1 (7.4)  Hgb 11.6 (11)  Cr 0.67 (0.74)  KARINE Cr (0.8)    Assessment/Plan  64 year old male w/ hx of liver txp who is now POD#2 s/p RALP and bladder biopsy for prostate cancer.    Neuro: prn oxy for pain control (no tylenol due to liver txp hx)  CV: HDS. HTN noted. PTA metoprolol. Restart PTA amlodipine.  Pulm: Room air; incentive spirometry while awake  FEN/GI: Regular diet, MIVF @ 50/hr. Bowel regimen. Simethicone prn.  Endo: sliding scale insulin  : Continue cota. Monitor UOP. KARINE Cr WNL. Remove KARINE drain this morning.  Heme/ID: Hgb stable. Monitor for leukocytosis and fevers. [place]-op ancef.  Txp: Hx of liver transplant. PTA tacrolimus.  Activity: Up ad shawn  PPx: SCDs   Dispo: Discharge home today    Seen and examined with chief resident and Dr. Anton. Will discuss with Dr. Casiano.    Rob Parker MD  Urology Resident, PGY2     Contacting the Urology Team     Please use the following job codes to reach the Urology Team. Note that you must use an in house phone and that job codes cannot receive text pages.     On weekdays, dial [phone] (or star-star-star 777 on the new [employer]) then [phone] to reach the Adult Urology resident or PA on call    On weekdays, dial 893 (or star-star-star 777 on the new [employer] telephones) then [phone] to reach the Pediatric Urology resident    On weeknights and weekends, dial 893 (or " star-star-star 777 on the new [employer] telephones) then [phone] to reach the Urology resident on call (for both Adult and Pediatrics)

## 2020-01-05 NOTE — PLAN OF CARE
VS: Temp: 98.9  F (37.2  C) Temp src: Oral BP: (!) 171/80   Heart Rate: 62 Resp: 16 SpO2: 97 % O2 Device: None (Room air)       O2: Stable on RA   Output: Cronin catheter in place. KARINE in place. KARINE to be removed before discharge    Last BM: 1/4/2020   Activity: UP SBA. Pt needs to walk as often as possible    Skin: Intact other than incisions on abdomen and KARINE drain   Pain: Pt stated that he was in some pain overnight, however he refused all interventions offered and stated that he needed rest    CMS: Intact   Dressing: KARINE drain dressing CDI   Diet: Regular   LDA: N/A   Equipment: Cronin catheter, KARINE drain   Plan: Continue to monitor throughout shift and intervene when necessary. Potential discharge 1/5/2020.    Additional Info: Pt is Citizen of [place] speaking and requires an interpretor for all education

## 2020-01-05 NOTE — PLAN OF CARE
VS: VSS    O2: Stable on RA   Output: Cronin catheter in place. Education completed , KARINE iremoved with scant drainage from insertion site. Patinet educated on dressing the insertion site if drainage occurs.    Last BM: 1/4/2020   Activity: UP SBA. Ambulated x3 in hallway    Skin: Intact other than incisions on abdomen   Pain: Declined   CMS: Intact   Dressing: KARINE drain removed and dressed   Diet: Regular   LDA: N/A   Equipment: Cronin catheter patent and draining adequate amts. Extra tape given for retaping for protocol.    Plan: Continue to monitor throughout shift and intervene when necessary.    Additional Info: Pt is Czech speaking and an  was used for all education and discharge instructions.    Pt. discharged at 10:15am via taxi to home. Pt. was accompanied by spouse, and left with personal belongings. Prior to discharge, KARINE drain was removed. Pt. received complete discharge paperwork and all medications as filled by discharge pharmacy. Pt. was given times of last dose for all discharge medications in writing on discharge medication sheets and all explanations and teachings done with .  Discharge teaching included  medication, pain management, activity restrictions, dressing changes, and signs and symptoms of infection. Pt. to follow up with Urology as noted in instructions.  Pt. had no further questions at the time of discharge and no unmet needs were identified.

## 2020-01-05 NOTE — DISCHARGE SUMMARY
Discharge Summary     Loy Limon MRN# [id number]   YOB: 1955 Age: 64 year old     Date of Admission:  1/3/2020  Date of Discharge::  2020 10:20 AM  Admitting Physician:  Kenrick Casiano MD  Discharge Physician:  Rob Parker MD  Primary Care Physician:         Jaswinder Anne          Admission Diagnoses:   Prostate cancer (H) [C61]    Past Medical History:   Diagnosis Date     Anemia      Biliary stricture of transplanted liver (H) 2018     BPH (benign prostatic hyperplasia)      Cancer (H)     hepatocellualr carcinoma     Cirrhosis of liver (H) 2018     Diabetes (H)      Enlarged prostate      Hypothyroidism      Inguinal hernia     Repaired with mesh on 18     Liver lesion 2018     Liver transplanted (H) 2018     donor liver transplant     Portal vein thrombosis     on path explant     Postoperative atrial fibrillation (H) 2018     Prostate cancer (H)      TB lung, latent     Treated              Discharge Diagnosis:   Same as above         Procedures:   1/3/20: Dr. Casiano (Urology) Procedure(s):  Robot-assisted laparoscopic radical prostatectomy, Bladder biopsy        Non-operative procedures:   None performed          Consultations:   None         Imaging Studies:     Results for orders placed or performed during the hospital encounter of 19   XR Surgery THOMAS Fluoro L/T 5 Min w Stills    Narrative    This exam was marked as non-reportable because it will not be read by a   radiologist or a [place] non-radiologist provider.                      Medications Prior to Admission:     No medications prior to admission.            Discharge Medications:     Discharge Medication List as of 2020  8:47 AM      START taking these medications    Details   bisacodyl (DULCOLAX) 10 MG suppository Place 1 suppository (10 mg) rectally daily For constipation. Stop if diarrhea occurs., Disp-10 suppository,  R-1, E-Prescribe      ciprofloxacin (CIPRO) 500 MG tablet Take 1 tablet (500 mg) by mouth 2 times daily Take just prior to Urology follow up appointment (w/ Dr. Casiano on 1/13/20), Disp-1 tablet, R-0, E-Prescribe      magnesium hydroxide (MILK OF MAGNESIA) 400 MG/5ML suspension Take 30 mLs by mouth daily as needed for constipation or heartburn (Stop if diarrhea occurs), Disp-355 mL, R-1, E-Prescribe      mineral oil liquid Take 30 mLs by mouth daily For constipation. Stop if diarrhea occurs.Disp-500 mL, [id number]         CONTINUE these medications which have CHANGED    Details   oxyCODONE (ROXICODONE) 5 MG tablet Take 1 tablet (5 mg) by mouth every 6 hours as needed for severe pain, Disp-20 tablet, R-0, Local Print         CONTINUE these medications which have NOT CHANGED    Details   alfuzosin ER (UROXATRAL) 10 MG 24 hr tablet Take 10 mg by mouth daily, Historical      amLODIPine (NORVASC) 10 MG tablet Take 1 tablet (10 mg) by mouth daily, Disp-30 tablet, R-11, E-Prescribe      aspirin (ASA) 325 MG EC tablet Take 1 tablet (325 mg) by mouth daily, Disp-30 tablet, R-11, E-Prescribe      blood glucose (NO BRAND SPECIFIED) lancets standard Use to test blood sugar 4 times daily or as directed.Disp-200 each, [id number]      blood glucose (ONETOUCH VERIO IQ) test strip Use to test blood sugar 4 times daily or as directed., Disp-200 strip, R-11, E-PrescribeOnetouch Verio Flex strips      metFORMIN (GLUCOPHAGE-XR) 500 MG 24 hr tablet Take 2 tablets (1,000 mg) by mouth 2 times daily (with meals), Disp-120 tablet, R-3, E-PrescribeThis is the correct dose.      metoprolol tartrate 75 MG TABS Take 75 mg by mouth 2 times daily, Disp-60 tablet, R-11, E-Prescribe      multivitamin w/minerals (THERA-VIT-M) tablet Take 1 tablet by mouth daily, Disp-90 tablet, R-3, E-Prescribe      URSODIOL PO Take 250 mg by mouth 2 times daily, Historical      glucose 40 % (400 mg/mL) GEL gel Take 15-30 g by mouth every 15 minutes as  needed for low blood sugarDisp-30 g, [id number]      Sharps Container MISC 1 each daily, Disp-1 each, R-2, E-Prescribe      tacrolimus (GENERIC EQUIVALENT) 1 MG capsule Take 3 capsules (3 mg) by mouth every 12 hours, Disp-180 capsule, R-11, E-PrescribeDos change                    Brief History of Illness:   Reason for admission requiring a surgical or invasive procedure:   Prostate cancer (H) [C61]   The patient underwent the following procedure(s):   See above   There were no immediate complications during this procedure.    Please refer to the full operative summary for details.           Hospital Course:   The patient's hospital course was unremarkable. Loy Limon recovered as anticipated and experienced no post-operative complications. On POD#2 patient was ambulating without assitance, tolerating a regular diet, had pain controlled with PO medications to go home with, and requiring no IV medications or fluids. Patient was discharged home with appropriate contact information, follow-up and instructions as seen below in the discharge paperwork. He will follow up with Dr. Casiano on 1/13/20.         Final Pathology Result:   Pending at time of discharge         Discharge Instructions and Follow-Up:     Discharge Procedure Orders   Reason for your hospital stay   Order Comments: You were in the hospital for prostate surgery.     Adult [facility] Follow-up and recommended labs and tests   Order Comments: Per discharge instructions    Appointments on [place] and/or [facility] (with [facility] or [facility] provider or service). Call [phone] if you haven't heard regarding these appointments within 7 days of discharge.     Activity   Order Comments: Per discharge instructions     Order Specific Question Answer Comments   Is discharge order? Yes      When to contact your care team   Order Comments: Per discharge instructions     Tubes and drains   Order Comments: You are going home with the following tubes or  "drains: cota catheter.  Tube cares per hospital or home care instructions     Supplies   Order Comments: List the supplies the pt needs to go home: Extra cota bag, cota leg bag, \"[place]\" catheter tape     Discharge Instructions   Order Comments: Discharge Diet:   -Regular    Activity:   - No strenuous exercise for 6 weeks.   - No lifting, pushing, pulling more than 10 pounds for 6 weeks. Take care when pushing with your arms to stand up.  - Do not strain your belly area.  When you bend, sit up or twice, you could strain the area around your incision.    - Do not strain with bowel movements.    - Do not drive until you can press the brake pedal quickly and fully without pain.   - Do not operate a motor vehicle while taking narcotic pain medications.     Medications:   1) PAIN: Oxycodone is a narcotic medication that has been prescribed for pain.  Narcotics will cause sleepiness and constipation, therefore it is best to stop or reduce them as soon as you can and switch to using acetaminophen (Tylenol) and/or ibuprofen (Advil/Motrin), taken as directed on the packaging.  Keep in mind that certain narcotics can contain acetaminophen, also called \"APAP\" on prescription bottles.  Do not take more than 4,000mg of Tylenol (acetaminophen/ APAP) from all sources in any 24 hour period since this can cause liver damage.  Never drive, operate machinery or drink alcoholic beverages while you are taking narcotic pain medications.     2) CONSTIPATION: Daily bisacodyl suppositories and daily mineral oil and daily milk of magnesia. Stop if diarrhea occurs. Other over the counter solutions such as prune juice, miralax, fiber products, senna, and dulcolax can also be used. If you still have not had a bowel movement in 3 days, start over-the-counter Milk of Magnesia taken twice daily until you have a nice bowel movement.  Call the office with any concerns.     3) ANTIBIOTICS  - Ciprofloxacin 500mg #1 tablet should be taken one hour " prior to your followup appointment with Dr. Casiano to remove your Cronin catheter    Wound Care:   - You may shower and get incisions wet starting 48 hrs after surgery.  - Do not scrub incisions or submerge wounds (aka, bath, pool, hot tub, etc.) for 2 weeks or until wounds have healed and catheter is removed.  - Remove wound dressing 48 hours after surgery if already not removed.   - If purple dermabond glue was used, avoid applying any lotions or ointments.   - Leave incision open to air.  Cover with gauze only if needed for comfort or to protect clothing from drainage.     Drains:  1) Cronin Catheter: You are going home with a Cronin catheter which will remain in place until your follow-up appointment. Your nurse or  will provide written catheter care instructions for you to take home.  - Protect the catheter and treat it like an extension of your body - keep it secured to your leg and do not let it get caught, snagged, or tugged.  - Should the catheter somehow come out of position, do not let anyone but a urologist who is aware of your recent surgery try to replace a catheter.  Best yet, contact our urology office right away with any concerns.     Follow-Up:   - Call your primary care provider to touch base regarding your recent admission.    - Follow up with Dr. Casiano as scheduled on 1/13/20  - Call or return sooner than your regularly scheduled visit if you develop any of the following:  Fever (greater than 101.3F), uncontrolled pain, uncontrolled nausea or vomiting, as well as increased redness, swelling, or drainage from your wound.    Phone numbers:  - Nursing phone helpline at the Urology Clinic (8A-5P M-F):  [phone].    - Nights or weekends, call [phone] and ask the  to page the urology resident on call.   - For emergencies, always call [phone]     Diet   Order Comments: Follow this diet upon discharge: Regular     Order Specific Question Answer Comments   Is discharge order? Yes              Discharge Disposition:     Discharged to Home      Condition at discharge: Good    --    Rob Parker MD  Urology Resident    12:09 PM, 1/5/2020

## 2020-01-06 ENCOUNTER — PRE VISIT (OUTPATIENT)
Dept: UROLOGY | Facility: CLINIC | Age: 65
End: 2020-01-06

## 2020-01-06 DIAGNOSIS — C61 PROSTATE CANCER (H): Primary | ICD-10-CM

## 2020-01-06 LAB — INTERPRETATION ECG - MUSE: NORMAL

## 2020-01-06 NOTE — TELEPHONE ENCOUNTER
Chief Complaint : post op Robot-assisted laparoscopic radical prostatectomy    Records/Orders: sent message about imaging before     Pt Contacted: no    At Rooming: GAIL

## 2020-01-07 LAB
BLD PROD TYP BPU: NORMAL
BLD PROD TYP BPU: NORMAL
BLD UNIT ID BPU: 0
BLD UNIT ID BPU: 0
BLOOD PRODUCT CODE: NORMAL
BLOOD PRODUCT CODE: NORMAL
BPU ID: NORMAL
BPU ID: NORMAL
TRANSFUSION STATUS PATIENT QL: NORMAL

## 2020-01-10 LAB — COPATH REPORT: NORMAL

## 2020-01-13 ENCOUNTER — OFFICE VISIT (OUTPATIENT)
Dept: FAMILY MEDICINE | Facility: CLINIC | Age: 65
End: 2020-01-13
Payer: COMMERCIAL

## 2020-01-13 ENCOUNTER — ANCILLARY PROCEDURE (OUTPATIENT)
Dept: GENERAL RADIOLOGY | Facility: CLINIC | Age: 65
End: 2020-01-13
Attending: UROLOGY
Payer: COMMERCIAL

## 2020-01-13 ENCOUNTER — OFFICE VISIT (OUTPATIENT)
Dept: UROLOGY | Facility: CLINIC | Age: 65
End: 2020-01-13
Payer: COMMERCIAL

## 2020-01-13 VITALS
OXYGEN SATURATION: 98 % | HEART RATE: 52 BPM | WEIGHT: 177 LBS | DIASTOLIC BLOOD PRESSURE: 70 MMHG | BODY MASS INDEX: 27.72 KG/M2 | SYSTOLIC BLOOD PRESSURE: 132 MMHG

## 2020-01-13 VITALS
WEIGHT: 175 LBS | HEIGHT: 67 IN | HEART RATE: 52 BPM | SYSTOLIC BLOOD PRESSURE: 144 MMHG | DIASTOLIC BLOOD PRESSURE: 72 MMHG | BODY MASS INDEX: 27.47 KG/M2

## 2020-01-13 DIAGNOSIS — E11.9 DM TYPE 2, GOAL HBA1C 7%-8% (H): Primary | ICD-10-CM

## 2020-01-13 DIAGNOSIS — C61 PROSTATE CANCER (H): ICD-10-CM

## 2020-01-13 DIAGNOSIS — C61 PROSTATE CANCER (H): Primary | ICD-10-CM

## 2020-01-13 RX ORDER — CIPROFLOXACIN 500 MG/1
TABLET, FILM COATED ORAL
Qty: 1 TABLET | Refills: 0 | Status: SHIPPED | OUTPATIENT
Start: 2020-01-13 | End: 2020-01-21

## 2020-01-13 ASSESSMENT — PAIN SCALES - GENERAL
PAINLEVEL: NO PAIN (0)
PAINLEVEL: MODERATE PAIN (4)

## 2020-01-13 ASSESSMENT — MIFFLIN-ST. JEOR: SCORE: 1532.42

## 2020-01-13 NOTE — PATIENT INSTRUCTIONS
[facility] Medication Refill Request Information:  * Please contact your pharmacy regarding ANY request for medication refills.  ** [facility] Prescription Fax = [phone]  * Please allow 3 business days for routine medication refills.  * Please allow 5 business days for controlled substance medication refills.     [facility] Test Result notification information:  *You will be notified with in 7-10 days of your appointment day regarding the results of your test.  If you are on MyChart you will be notified as soon as the provider has reviewed the results and signed off on them.    [facility]: [phone]

## 2020-01-13 NOTE — LETTER
1/13/2020       RE: Loy Limon  [address]  [place] [address]     Dear Colleague,    Thank you for referring your patient, Loy Limon, to the [facility] UROLOGY AND [facility] FOR PROSTATE AND UROLOGIC CANCERS at [facility]. Please see a copy of my visit note below.    Service Date: 01/13/2020      REASON FOR VISIT TODAY:  Prostate cancer.      HISTORY OF PRESENT ILLNESS:  Mr. Limon is a 64-year-old gentleman followed in our clinic for a history of prostate cancer.  The patient also has a history of a liver transplant.  The patient underwent a robotic-assisted laparoscopic radical prostatectomy on 01/03/2020.  The patient comes in today in followup.  He notes he has done well while at home.  He denies any swelling in his legs or any chest pain, shortness of breath.  He notes that he has tolerated the catheter, but he is ready for it to come out.      PHYSICAL EXAMINATION:  His blood pressure is 144/72, pulse 52.  He is in no acute distress.  His abdomen is soft and nontender.  All of his port sites are well healing without erythema or discharge.      The patient's cystogram from today was reviewed by myself and there was no evidence of leak.  The patient's pathology report demonstrated Rebecca 3+4 equals 7, [id number] disease with negative margins.      ASSESSMENT AND PLAN:  Over half of today's 25-minute visit was spent counseling the patient regarding his prostate cancer.  I suggested to Mr. Limon that we are pleased to see that he is doing well following his prostatectomy.  The patient's pathology did demonstrate a focal area of T3 disease, but the margins were all negative and this bodes well.  We will go ahead and get Decipher testing, however, given the T3 status of his disease.        The patient was able to pass his voiding trial today without difficulty.  We will also refer the patient to physical therapy for pelvic floor strengthening exercises.   He was counseled he continues to be on a lifting restriction for up to 6 weeks from surgery.  The patient will follow up with us in 1 month with Kerri Pascual for his final postoperative check before going back to more strenuous activities.  I will then see the patient back at 3 months from surgery with his first PSA.         TEE DAWN MD             D: 2020   T: 2020   MT: stew      Name:     DONNA BIGGS   MRN:      [id number]        Account:      [id number]   :      1955           Service Date: 2020      Document: [id number]

## 2020-01-13 NOTE — PROGRESS NOTES
[facility]  Primary Care Center   Jaswinder Anne MD  01/13/2020      Chief Complaint:   Recheck Medication       History of Present Illness:   Due to language barrier, an  was present during the history-taking and subsequent discussion (and for part of the physical exam) with this patient.  Loy Limon is a 64 year old male with a history of hepatocellular carcinoma s/p liver transplant in 2018, prostate cancer s/p radical prostatectomy on 01/03/2020, drug induced diabetes and hypertension who presents for follow up.     Other:  1. Reports he is gradually improving after surgery.   2. Due for pneumonia booster.   3. Reports when he had his influenza vaccination his arm became very swollen.   4. He would like to have his diabetes education appointment canceled. His A1c while he was in the hospital for surgery was 7.2%. He would like to reschedule for a month.      Review of Systems:   Pertinent items are noted in HPI or as in patient entered ROS below, remainder of complete ROS is negative.     Active Medications:      alfuzosin ER (UROXATRAL) 10 MG 24 hr tablet, Take 10 mg by mouth daily, Disp: , Rfl:      amLODIPine (NORVASC) 10 MG tablet, Take 1 tablet (10 mg) by mouth daily (Patient taking differently: Take 10 mg by mouth every morning ), Disp: 30 tablet, Rfl: 11     aspirin (ASA) 325 MG EC tablet, Take 1 tablet (325 mg) by mouth daily (Patient taking differently: Take 325 mg by mouth every morning ), Disp: 30 tablet, Rfl: 11     bisacodyl (DULCOLAX) 10 MG suppository, Place 1 suppository (10 mg) rectally daily For constipation. Stop if diarrhea occurs., Disp: 10 suppository, Rfl: 1     ciprofloxacin (CIPRO) 500 MG tablet, Take 1 tablet (500 mg) by mouth 2 times daily Take just prior to Urology follow up appointment (w/ Dr. Casiano on 1/13/20), Disp: 1 tablet, Rfl: 0     glucose 40 % (400 mg/mL) GEL gel, Take 15-30 g by mouth every 15 minutes as needed for low blood sugar, Disp: 30 g, Rfl: 3      magnesium hydroxide (MILK OF MAGNESIA) 400 MG/5ML suspension, Take 30 mLs by mouth daily as needed for constipation or heartburn (Stop if diarrhea occurs), Disp: 355 mL, Rfl: 1     metFORMIN (GLUCOPHAGE-XR) 500 MG 24 hr tablet, Take 2 tablets (1,000 mg) by mouth 2 times daily (with meals) (Patient taking differently: Take 1,000 mg by mouth every morning ), Disp: 120 tablet, Rfl: 3     metoprolol tartrate 75 MG TABS, Take 75 mg by mouth 2 times daily, Disp: 60 tablet, Rfl: 11     mineral oil liquid, Take 30 mLs by mouth daily For constipation. Stop if diarrhea occurs., Disp: 500 mL, Rfl: 1     multivitamin w/minerals (THERA-VIT-M) tablet, Take 1 tablet by mouth daily (Patient taking differently: Take 1 tablet by mouth every morning ), Disp: 90 tablet, Rfl: 3     oxyCODONE (ROXICODONE) 5 MG tablet, Take 1 tablet (5 mg) by mouth every 6 hours as needed for severe pain, Disp: 20 tablet, Rfl: 0     Sharps Container MISC, 1 each daily, Disp: 1 each, Rfl: 2     tacrolimus (GENERIC EQUIVALENT) 1 MG capsule, Take 3 capsules (3 mg) by mouth every 12 hours, Disp: 180 capsule, Rfl: 11     URSODIOL PO, Take 250 mg by mouth 2 times daily, Disp: , Rfl:       Allergies:   Patient has no known allergies.      Past Medical History:  Anemia   Post operative atrial fibrillation   Hypertension   Prostate cancer   Bilateral stricture of transplanted liver   BPH   Hepatocellular carcinoma s/p liver transplant in 2018   Liver cirrhosis   Immunosuppressed status   Drug induced Diabetes   Inguinal hernia   Latent TB      Past Surgical History:  Appendectomy    DaVinci radical prostatectomy-01/2020   Inguinal herniorrhaphy-12/2018   Liver biopsy-12/2018   Liver transplant-12/2018   Laparotomy exploratory     Family History:   Mother: memory loss   Brother: liver disease       Social History:   The patient was accompanied to the appointment by: interpretor   Smoking Status: former; 0.03 packs per day for 20 years; about 2 years since last  attempt to quit   Smokeless Tobacco: never    Alcohol Use: former; quit in February 2018       Physical Exam:   /70 (BP Location: Right arm, Patient Position: Sitting, Cuff Size: Adult Regular)   Pulse 52   Wt 80.3 kg (177 lb)   SpO2 98%   BMI 27.72 kg/m     Constitutional: Alert. In no distress.  Head: Normocephalic. No masses, lesions, tenderness or abnormalities.  Psychiatric: Mentation appears normal. Normal affect.      Assessment and Plan:  Prostate cancer (H)  I gave him one pill to take now before his visit with urology today.   - ciprofloxacin (CIPRO) 500 MG tablet  Dispense: 1 tablet; Refill: 0    Diabetes   Rvwd  Recent Hemoglobin A1c at goal. He would like to wait a month to visit Endocrinology.     I will give him a Prevnar booster as he has had both pneumonia vaccinations in 2018 after age 63 but less then one year apart.      Follow-up: Follow up in 6 months.        Scribe Disclosure:  I, Kym Trammell, am serving as a scribe to document services personally performed by Jaswinder Anne MD at this visit, based upon the provider's statements to me. All documentation has been reviewed by the aforementioned provider prior to being entered into the official medical record.     Portions of this medical record were completed by a scribe. UPON MY REVIEW AND AUTHENTICATION BY ELECTRONIC SIGNATURE, this confirms (a) I performed the applicable clinical services, and (b) the record is accurate.   Jaswinder Anne MD

## 2020-01-13 NOTE — PROGRESS NOTES
Service Date: 01/13/2020      REASON FOR VISIT TODAY:  Prostate cancer.      HISTORY OF PRESENT ILLNESS:  Mr. Limon is a 64-year-old gentleman followed in our clinic for a history of prostate cancer.  The patient also has a history of a liver transplant.  The patient underwent a robotic-assisted laparoscopic radical prostatectomy on 01/03/2020.  The patient comes in today in followup.  He notes he has done well while at home.  He denies any swelling in his legs or any chest pain, shortness of breath.  He notes that he has tolerated the catheter, but he is ready for it to come out.      PHYSICAL EXAMINATION:  His blood pressure is 144/72, pulse 52.  He is in no acute distress.  His abdomen is soft and nontender.  All of his port sites are well healing without erythema or discharge.      The patient's cystogram from today was reviewed by myself and there was no evidence of leak.  The patient's pathology report demonstrated Rebecca 3+4 equals 7, [id number] disease with negative margins.      ASSESSMENT AND PLAN:  Over half of today's 25-minute visit was spent counseling the patient regarding his prostate cancer.  I suggested to Mr. Limon that we are pleased to see that he is doing well following his prostatectomy.  The patient's pathology did demonstrate a focal area of T3 disease, but the margins were all negative and this bodes well.  We will go ahead and get Decipher testing, however, given the T3 status of his disease.        The patient was able to pass his voiding trial today without difficulty.  We will also refer the patient to physical therapy for pelvic floor strengthening exercises.  He was counseled he continues to be on a lifting restriction for up to 6 weeks from surgery.  The patient will follow up with us in 1 month with Kerri Pascual for his final postoperative check before going back to more strenuous activities.  I will then see the patient back at 3 months from surgery with his first PSA.          TEE DAWN MD             D: 2020   T: 2020   MT: stew      Name:     DONNA BIGGS   MRN:      [id number]        Account:      [id number]   :      1955           Service Date: 2020      Document: [id number]

## 2020-01-13 NOTE — PATIENT INSTRUCTIONS
Please make a appointment with Kerri in 1 month   Please follow up with  in 3 month with a PSA before       It was a pleasure meeting with you today.  Thank you for allowing me and my team the privilege of caring for you today.  YOU are the reason we are here, and I truly hope we provided you with the excellent service you deserve.  Please let us know if there is anything else we can do for you so that we can be sure you are leaving completely satisfied with your care experience.

## 2020-01-13 NOTE — NURSING NOTE
Chief Complaint   Patient presents with     RECHECK      post op Robot-assisted laparoscopic radical prostatectomy       Belkys Reilly MA

## 2020-01-13 NOTE — NURSING NOTE
Patient presents to the clinic today for a trial of void, catheter removal.  Patient is Crisofro    Order by:      Cipro 500 mg PO administered prior to procedure.    Approx 180 mL of sterile water instilled into the bladder via catheter.  20 [place] indwelling urethral Catheter then removed with out difficulty  10mL water removed from balloon, balloon intact  Patient voided approx 200mL of clear urine.   Patient tolerated procedure well.          Belkys Reilly CMA MA

## 2020-01-20 ENCOUNTER — TELEPHONE (OUTPATIENT)
Dept: TRANSPLANT | Facility: CLINIC | Age: 65
End: 2020-01-20

## 2020-01-20 ENCOUNTER — APPOINTMENT (OUTPATIENT)
Dept: INTERPRETER SERVICES | Facility: CLINIC | Age: 65
End: 2020-01-20
Payer: COMMERCIAL

## 2020-01-20 NOTE — TELEPHONE ENCOUNTER
Pt calls to state that [facility] pharmacy needs authorization from our clinic to fill his medications. Informed pt that writer will call the pharmacy and find out what is going on. Writer called [facility] and found out that the pharmacy is closing for good and that all scripts will be transferred to the [facility]'s on [address] before 2pm today. Called pt back and informed him that all scripts will be sent to pharmacy listed above. Pt understood.

## 2020-01-21 ENCOUNTER — APPOINTMENT (OUTPATIENT)
Dept: INTERPRETER SERVICES | Facility: CLINIC | Age: 65
End: 2020-01-21
Payer: COMMERCIAL

## 2020-01-21 DIAGNOSIS — I10 ESSENTIAL HYPERTENSION: ICD-10-CM

## 2020-01-21 DIAGNOSIS — R73.9 HYPERGLYCEMIA: ICD-10-CM

## 2020-01-21 DIAGNOSIS — Z94.4 LIVER TRANSPLANT RECIPIENT (H): ICD-10-CM

## 2020-01-21 DIAGNOSIS — E11.9 DM TYPE 2, GOAL HBA1C 7%-8% (H): ICD-10-CM

## 2020-01-21 DIAGNOSIS — I48.91 ATRIAL FIBRILLATION WITH RAPID VENTRICULAR RESPONSE (H): ICD-10-CM

## 2020-01-21 RX ORDER — METFORMIN HCL 500 MG
1000 TABLET, EXTENDED RELEASE 24 HR ORAL 2 TIMES DAILY WITH MEALS
Qty: 120 TABLET | Refills: 2 | Status: SHIPPED | OUTPATIENT
Start: 2020-01-21 | End: 2020-04-16

## 2020-01-21 RX ORDER — MULTIPLE VITAMINS W/ MINERALS TAB 9MG-400MCG
1 TAB ORAL DAILY
Qty: 90 TABLET | Refills: 3 | Status: SHIPPED | OUTPATIENT
Start: 2020-01-21 | End: 2020-04-06

## 2020-01-21 RX ORDER — METOPROLOL TARTRATE 75 MG/1
75 TABLET, FILM COATED ORAL 2 TIMES DAILY
Qty: 180 TABLET | Refills: 3 | OUTPATIENT
Start: 2020-01-21

## 2020-01-21 RX ORDER — ASPIRIN 325 MG
325 TABLET, DELAYED RELEASE (ENTERIC COATED) ORAL DAILY
Qty: 90 TABLET | Refills: 3 | Status: SHIPPED | OUTPATIENT
Start: 2020-01-21 | End: 2020-05-08

## 2020-01-21 RX ORDER — AMLODIPINE BESYLATE 10 MG/1
10 TABLET ORAL DAILY
Qty: 90 TABLET | Refills: 3 | Status: SHIPPED | OUTPATIENT
Start: 2020-01-21 | End: 2021-01-07

## 2020-01-21 RX ORDER — NICOTINE POLACRILEX 4 MG
15-30 LOZENGE BUCCAL
Qty: 30 G | Refills: 3 | Status: SHIPPED | OUTPATIENT
Start: 2020-01-21 | End: 2022-10-11

## 2020-01-21 NOTE — TELEPHONE ENCOUNTER
Refilled the meds except tacrolimus which comes from transplant clinic. I sent the message to transplant clinic pool.

## 2020-01-21 NOTE — TELEPHONE ENCOUNTER
amLODIPine (NORVASC) 10 MG tablet   Last Written Prescription Date:  1/7/19  Last Fill Quantity: 30,   # refills: 11  Last Office Visit : 1/13/20  Future Office visit:  7/15/20       metoprolol tartrate 75 MG TABS  Last Written Prescription Date:  1/7/19  Last Fill Quantity: 60,   # refills: 11      glucose 40 % (400 mg/mL) GEL gel   Last Written Prescription Date: 1/7/19  Last Fill Quantity: 30g,   # refills: 3      Test strips, , lancets     pharmacy change Metformin    Routed because : amlodipine. Metoprolol, glucose previously from other provider.    called [facility] asking them to do proflie transfer from [facility]. States they will make request.

## 2020-01-21 NOTE — TELEPHONE ENCOUNTER
[facility] Call Center    Phone Message    May a detailed message be left on voicemail: yes    Reason for Call: Medication Refill Request    Has the patient contacted the pharmacy for the refill? Yes   Name of medication being requested: Per call from PT with an  is requesting refills on all RX on the med list except the aspirin, multivitamin and tacrolimus. Please reach out to the PT if any questions.   Provider who prescribed the medication: Dr Anne  [id number] [phone]. Per PT no longer using the [facility]  Date medication is needed: ASAP, Per PT completely out          Action Taken: Message routed to:  [facility] Primary Care

## 2020-01-22 ENCOUNTER — TELEPHONE (OUTPATIENT)
Dept: TRANSPLANT | Facility: CLINIC | Age: 65
End: 2020-01-22

## 2020-01-22 RX ORDER — TACROLIMUS 1 MG/1
3 CAPSULE ORAL EVERY 12 HOURS
Qty: 180 CAPSULE | Refills: 11 | Status: SHIPPED | OUTPATIENT
Start: 2020-01-22 | End: 2020-09-25

## 2020-01-22 NOTE — TELEPHONE ENCOUNTER
Spoke to [facility] who states that tacro is on back order and that all of pt's medications has [facility] transferred to the [facility]. That pharmacy will provide pt with all of his meds including tacro.

## 2020-01-23 DIAGNOSIS — I48.91 ATRIAL FIBRILLATION WITH RAPID VENTRICULAR RESPONSE (H): ICD-10-CM

## 2020-01-23 RX ORDER — METOPROLOL TARTRATE 75 MG/1
75 TABLET, FILM COATED ORAL 2 TIMES DAILY
Qty: 60 TABLET | Refills: 2 | Status: SHIPPED | OUTPATIENT
Start: 2020-01-23 | End: 2020-05-11

## 2020-01-23 NOTE — TELEPHONE ENCOUNTER
Metoprolol Tartrate 75 MG TABS      Last Written Prescription Date:  1/7/19  Last Fill Quantity: 60,   # refills: 11  Last Office Visit : 1/13/20  Future Office visit:  7/15/20    Routing refill request to provider for review/approval because:  Blood pressure out of range   BP Readings from Last 3 Encounters:   01/13/20 (!) 144/72   01/13/20 132/70   01/05/20 (!) 158/62     Per protocol, 90 day RX sent to pharmacy, routing to provider for follow up  No mention of BP in dictation from 1/13/20

## 2020-01-24 DIAGNOSIS — Z94.4 LIVER TRANSPLANTED (H): Primary | ICD-10-CM

## 2020-01-24 RX ORDER — URSODIOL 250 MG/1
250 TABLET, FILM COATED ORAL 2 TIMES DAILY
Qty: 180 TABLET | Refills: 3 | Status: SHIPPED | OUTPATIENT
Start: 2020-01-24 | End: 2021-02-22

## 2020-01-24 NOTE — TELEPHONE ENCOUNTER
Health Call Center    Phone Message    May a detailed message be left on voicemail: yes    Reason for Call: Medication Question or concern regarding medication   Prescription Clarification  Name of Medication: URSODIOL PO  Prescribing Provider: Dr. Inez Baker   Pharmacy: [facility] - [place], [place] - [address]   What on the order needs clarification? Pharmacist with [facility] states pt's previous provider for this medication is declining to renew, asking that pt's transplant provider prescribe from now on. Please advise.    Action Taken: Message routed to:  [facility] Hepatology

## 2020-01-29 ENCOUNTER — ALLIED HEALTH/NURSE VISIT (OUTPATIENT)
Dept: EDUCATION SERVICES | Facility: CLINIC | Age: 65
End: 2020-01-29
Payer: COMMERCIAL

## 2020-01-29 DIAGNOSIS — E11.9 TYPE 2 DIABETES MELLITUS (H): Primary | ICD-10-CM

## 2020-01-30 VITALS — BODY MASS INDEX: 28.24 KG/M2 | WEIGHT: 180.3 LBS

## 2020-01-30 NOTE — PROGRESS NOTES
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  States that he was pre-diabetic prior to surgery.  + family history of diabetes--states that his brother \" from diabetes\".    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  Todays visit conducted with the assistance of [facility]  as his scheduled  was a no show for this appointment.      Diabetes Symptoms & Complications     Patient Problem List and Family Medical History reviewed for relevant medical history, current medical status, and diabetes risk factors.    Vitals:  Wt 81.8 kg (180 lb 4.8 oz)   BMI 28.24 kg/m    Estimated body mass index is 28.24 kg/m  as calculated from the following:    Height as of 20: 1.702 m (5' 7\").    Weight as of this encounter: 81.8 kg (180 lb 4.8 oz).   Last 3 BP:   BP Readings from Last 3 Encounters:   20 (!) 144/72   20 132/70   20 (!) 158/62       History   Smoking Status     Former Smoker     Packs/day: 0.03     Years: 20.00     Types: Cigarettes, Cigars     Start date: 1970     Quit date: 3/14/2018   Smokeless Tobacco     Never Used     Comment: Quit smoking 2018, one cigarette after dinner       Labs:  Lab Results   Component Value Date    A1C 7.2 2020     Lab Results   Component Value Date     2020     Lab Results   Component Value Date    LDL 71 10/23/2019     HDL Cholesterol   Date Value Ref Range Status   10/23/2019 38 (L) >39 mg/dL Final   ]  GFR Estimate   Date Value Ref Range Status   2020 >90 >60 mL/min/[1.73_m2] Final     Comment:     Non  GFR Calc  Starting 2018, serum creatinine based estimated GFR (eGFR) will be   calculated using the Chronic Kidney Disease " "Epidemiology Collaboration   (CKD-EPI) equation.       GFR Estimate If Black   Date Value Ref Range Status   01/05/2020 >90 >60 mL/min/[1.73_m2] Final     Comment:      GFR Calc  Starting 12/18/2018, serum creatinine based estimated GFR (eGFR) will be   calculated using the Chronic Kidney Disease Epidemiology Collaboration   (CKD-EPI) equation.       Lab Results   Component Value Date    CR 0.67 01/05/2020     No results found for: MICROALBUMIN    Healthy Eating:  Following our last visit, Loy states that he has cut down on how many tortillas he is eating at meals from 6 to 7 to 3.    He worries about getting \"skinny\".       Being Active:  Recent prostatectomy for prostate CA.  He is having some incontinence and pain, still.  His next appointment with Urology is not until Feb 18.  He states that he doesn't think he can wait that long but for some reason doesn't know how to contact the Urology clinic for assistance.  He states that he has a hard time sitting, and walking so his activity recently is quite limited.  He is not back at work yet.     Monitoring:  Didn't bring his meter with him today.  States that his fasting blood glucoses are between 130-140 and his post meal blood glucoses are between 165-175.       Taking Medications  Diabetes Medication(s)     Biguanides       metFORMIN (GLUCOPHAGE-XR) 500 MG 24 hr tablet    Take 2 tablets (1,000 mg) by mouth 2 times daily (with meals)    Diabetic Other       glucose 40 % (400 mg/mL) gel    Take 15-30 g by mouth every 15 minutes as needed for low blood sugar        ASSESSMENT:  Patient's most recent   Lab Results   Component Value Date    A1C 7.2 01/03/2020    is not meeting goal of <7.0    Education provided today on:  AADE Self-Care Behaviors:    Given much positive feedback for changes he has made in his diet.  Reassured that he is in no danger of getting \"too skinny\" at the moment.  Encouraged him to continue to reduce the amount of refined " carbohydrates in his diet and to continue to work in fruits and vegetables.  Today his question was about fruit.  He is eating a banana after every meal.  Suggested that he may want to try different types of fruits, and suggested that he try apples, oranges, pears, which are about half the carb content of bananas.      As he is able, encouraged him to start increasing his activity.      Opportunities for ongoing education and support in diabetes-self management were discussed.    Pt verbalized understanding of concepts discussed and recommendations provided today.       PLAN:  See Patient Instructions for co-developed, patient-stated behavior change goals.  AVS printed and provided to patient today. See Follow-Up section for recommended follow-up.  Suggested that he call to schedule a follow up appointment when he is more fullty recovered from his surgery, in a month or two.      Time Spent: 60 minutes  Encounter Type: Individual    Any diabetes medication dose changes were made via the CDE Protocol and Collaborative Practice Agreement with the patient's referring provider. A copy of this encounter was shared with the provider.

## 2020-02-04 ENCOUNTER — PRE VISIT (OUTPATIENT)
Dept: UROLOGY | Facility: CLINIC | Age: 65
End: 2020-02-04

## 2020-02-04 NOTE — TELEPHONE ENCOUNTER
Chief Complaint : Post op - Radical Prostatectomy    Hx/Sx: Prostate cancer    Records/Orders: Available    Pt Contacted: No    At Rooming: Normal    
Yes

## 2020-02-06 ENCOUNTER — APPOINTMENT (OUTPATIENT)
Dept: INTERPRETER SERVICES | Facility: CLINIC | Age: 65
End: 2020-02-06
Payer: COMMERCIAL

## 2020-02-14 ENCOUNTER — APPOINTMENT (OUTPATIENT)
Dept: INTERPRETER SERVICES | Facility: CLINIC | Age: 65
End: 2020-02-14
Payer: COMMERCIAL

## 2020-02-18 ENCOUNTER — OFFICE VISIT (OUTPATIENT)
Dept: UROLOGY | Facility: CLINIC | Age: 65
End: 2020-02-18
Payer: COMMERCIAL

## 2020-02-18 VITALS
WEIGHT: 165 LBS | HEIGHT: 67 IN | HEART RATE: 90 BPM | BODY MASS INDEX: 25.9 KG/M2 | SYSTOLIC BLOOD PRESSURE: 162 MMHG | DIASTOLIC BLOOD PRESSURE: 88 MMHG

## 2020-02-18 DIAGNOSIS — R14.0 ABDOMINAL BLOATING: ICD-10-CM

## 2020-02-18 DIAGNOSIS — N40.0 BPH (BENIGN PROSTATIC HYPERPLASIA): Primary | ICD-10-CM

## 2020-02-18 DIAGNOSIS — N39.3 STRESS INCONTINENCE OF URINE: Primary | ICD-10-CM

## 2020-02-18 LAB
ALBUMIN UR-MCNC: 100 MG/DL
APPEARANCE UR: ABNORMAL
BILIRUB UR QL STRIP: NEGATIVE
CAOX CRY #/AREA URNS HPF: ABNORMAL /HPF
COLOR UR AUTO: YELLOW
GLUCOSE UR STRIP-MCNC: NEGATIVE MG/DL
HGB UR QL STRIP: ABNORMAL
HYALINE CASTS #/AREA URNS LPF: 4 /LPF (ref 0–2)
KETONES UR STRIP-MCNC: NEGATIVE MG/DL
LEUKOCYTE ESTERASE UR QL STRIP: ABNORMAL
MUCOUS THREADS #/AREA URNS LPF: PRESENT /LPF
NITRATE UR QL: NEGATIVE
PH UR STRIP: 5 PH (ref 5–7)
RBC #/AREA URNS AUTO: 25 /HPF (ref 0–2)
SOURCE: ABNORMAL
SP GR UR STRIP: 1.02 (ref 1–1.03)
SQUAMOUS #/AREA URNS AUTO: 1 /HPF (ref 0–1)
UROBILINOGEN UR STRIP-MCNC: 0 MG/DL (ref 0–2)
WBC #/AREA URNS AUTO: 25 /HPF (ref 0–5)

## 2020-02-18 RX ORDER — POLYETHYLENE GLYCOL 3350 17 G/17G
1 POWDER, FOR SOLUTION ORAL DAILY
Qty: 30 PACKET | Refills: 3 | Status: SHIPPED | OUTPATIENT
Start: 2020-02-18 | End: 2021-06-18

## 2020-02-18 ASSESSMENT — MIFFLIN-ST. JEOR: SCORE: 1487.07

## 2020-02-18 ASSESSMENT — PAIN SCALES - GENERAL: PAINLEVEL: NO PAIN (0)

## 2020-02-18 NOTE — PROGRESS NOTES
"HPI: Mr. Loy Limon is a 64 year old year old male presenting today for evaluation of chief complaint(s): RECHECK (Prostate cancer w/ prostatectomy )    Mr. Limon has PMH significant for immunosuppression d/t liver transplant.  More recently he also developed prostate cancer (4/26/19 - PSA 5.51ng/dL) and he underwent a RALP with Dr. Casiano on 1/3/20, final path showing [place] 3+4=7, [id number].  He was last seen by Dr. Casiano 1/13/20 for Cronin removal.  He was begun on pelvic floor physical therapy.  Today he returns in routine followup for a postop check and to lift activity restrictions.      VOIDING SYMPTOMS: Patient is concerned that he is leaking urine.  Leaks with coughing, standing up from a sitting position.   With sleeping, gets up every 1/2 hour with urge to void (gets up 5-8 times).  As he travels to the toilet, he begins to leak urine.  Also will leak with rolling over in bed.  With voiding, feels he is emptying because his stream is very strong.  No dysuria.  Does have skin irritation from wearing the diaper and putting paper inside his diapers. Using 2-3 diapers per day.   Has PFPT first appt scheduled for 2/26/20. Caffeine:  Only occasionally.      - Incisions: no issues  - Does feel abdominal bloating - this preceded his prostate cancer surgery but remains persistent.  Can have some \"burning\" sensation in upper abdomen.   BMs are no problem.  Last BM was yesterday, may not fully evacuate.    - Eating well.  No nausea, emesis  - No other concerns     used today.     Current Outpatient Medications   Medication Sig Dispense Refill     alfuzosin ER (UROXATRAL) 10 MG 24 hr tablet Take 10 mg by mouth daily       amLODIPine (NORVASC) 10 MG tablet Take 1 tablet (10 mg) by mouth daily 90 tablet 3     aspirin (ASA) 325 MG EC tablet Take 1 tablet (325 mg) by mouth daily 90 tablet 3     bisacodyl (DULCOLAX) 10 MG suppository Place 1 suppository (10 mg) rectally daily For constipation. " "Stop if diarrhea occurs. 10 suppository 1     blood glucose (NO BRAND SPECIFIED) lancets standard Use to test blood sugar 4 times daily or as directed. 200 each 11     blood glucose (ONETOUCH VERIO IQ) test strip Use to test blood sugar 4 times daily or as directed. 200 strip 11     glucose 40 % (400 mg/mL) gel Take 15-30 g by mouth every 15 minutes as needed for low blood sugar 30 g 3     magnesium hydroxide (MILK OF MAGNESIA) 400 MG/5ML suspension Take 30 mLs by mouth daily as needed for constipation or heartburn (Stop if diarrhea occurs) 355 mL 1     metFORMIN (GLUCOPHAGE-XR) 500 MG 24 hr tablet Take 2 tablets (1,000 mg) by mouth 2 times daily (with meals) 120 tablet 2     Metoprolol Tartrate 75 MG TABS Take 75 mg by mouth 2 times daily 60 tablet 2     mineral oil liquid Take 30 mLs by mouth daily For constipation. Stop if diarrhea occurs. 500 mL 1     multivitamin w/minerals (THERA-VIT-M) tablet Take 1 tablet by mouth daily 90 tablet 3     oxyCODONE (ROXICODONE) 5 MG tablet Take 1 tablet (5 mg) by mouth every 6 hours as needed for severe pain 20 tablet 0     Sharps Container MISC 1 each daily 1 each 2     tacrolimus (GENERIC EQUIVALENT) 1 MG capsule Take 3 capsules (3 mg) by mouth every 12 hours 180 capsule 11     ursodiol (ACTIGALL) 250 MG tablet Take 1 tablet (250 mg) by mouth 2 times daily 180 tablet 3       ALLERGIES: Patient has no known allergies.      REVIEW OF SYSTEMS:  As above in HPI    GENERAL PHYSICAL EXAM:   Vitals: BP (!) 162/88   Pulse 90   Ht 1.702 m (5' 7\")   Wt 74.8 kg (165 lb)   BMI 25.84 kg/m    Body mass index is 25.84 kg/m .    GENERAL: Well groomed, well developed, well nourished male in NAD.  GI: Soft, NT, + firmness in upper abdomen. Soft in lower abdomen.  Multiple incisions well healed.  No hernias, erythema, tenderness.     MS: Gait normal, normal muscle tone  SKIN: Warm to touch, dry.  No visible rashes or lesions on examined areas.  NEURO: Alert and oriented x 3.  PSYCH: Normal " mood and affect, pleasant and agreeable during interview and exam.     :      Inguinal: no hernias or palpable lymph nodes      Uncircumcised penis, no penile plaques or lesions. Orthotopic location of the urethral meatus. No rashes or lesions      Scrotum normal.      Testicles of normal firmness and consistency, no masses.      No groin rashes.     PVR: Residual urine by ultrasound was 10 ml.      LABS: The last test results for Mr. Loy Limon were reviewed:  PSA -   Lab Results   Component Value Date    PSA 5.51 04/26/2019    PSA 5.02 03/19/2019    PSA 5.25 03/04/2019    PSA 3.27 08/15/2018    PSA 4.32 07/19/2018     BMP -   Recent Labs   Lab Test 01/05/20 0520 01/04/20 0528 01/03/20 2034 12/18/19  0753  11/18/19  0810 11/06/19  0711    136 140 140   < > 139 138   POTASSIUM 4.5 4.9 5.2 4.5   < > 4.9 4.8   CHLORIDE 110* 105 109 110*   < > 109 108   CO2 27 23 23 27   < > 26 26   BUN 16 23 23 25   < > 17 21   CR 0.67 0.74 0.79 0.77   < > 0.72 0.87   * 206* 253* 166*   < > 153* 162*   YELENA 7.8* 7.4* 7.5* 8.4*   < > 8.4* 8.8   MAG  --   --   --  2.0  --  2.0 1.8   PHOS  --   --   --  2.7  --  2.4* 3.0    < > = values in this interval not displayed.       CBC -   Recent Labs   Lab Test 01/05/20 0520 01/04/20 0528 01/03/20 2034   WBC 5.1 7.4 7.8   HGB 11.6* 11.0* 11.9*    177 169       ASSESSMENT:   1) prostate cancer  2) urinary incontinence  3) frequency/urgency  4) Abdominal bloating    PLAN:   - Follow up with pelvic floor physical therapist  - UA micro reflex today  - PVR today   - Start Miralax RX - one dose daily - continue unless your stools become too soft, then reduce to every other day.   - Abdominal xray  - Follow up with PCP or regarding bloating (which preceded his prostate cancer surgery)   - Follow up with Dr. Casiano in ~6 weeks with PSA at the lab first.     Kerri Pascual PA-C  Department of Urologic Surgery

## 2020-02-18 NOTE — LETTER
Loy Limon   [address]   [place] [address]        Dear Loy:    I am writing you concerning your recent test results:    Results for orders placed or performed in visit on 02/18/20   UA with Microscopic reflex to Culture     Status: Abnormal   Result Value Ref Range    Color Urine Yellow     Appearance Urine Slightly Cloudy     Glucose Urine Negative NEG^Negative mg/dL    Bilirubin Urine Negative NEG^Negative    Ketones Urine Negative NEG^Negative mg/dL    Specific Gravity Urine 1.018 1.003 - 1.035    Blood Urine Small (A) NEG^Negative    pH Urine 5.0 5.0 - 7.0 pH    Protein Albumin Urine 100 (A) NEG^Negative mg/dL    Urobilinogen mg/dL 0.0 0.0 - 2.0 mg/dL    Nitrite Urine Negative NEG^Negative    Leukocyte Esterase Urine Trace (A) NEG^Negative    Source Midstream Urine     WBC Urine 25 (H) 0 - 5 /HPF    RBC Urine 25 (H) 0 - 2 /HPF    Squamous Epithelial /HPF Urine 1 0 - 1 /HPF    Mucous Urine Present (A) NEG^Negative /LPF    Hyaline Casts 4 (H) 0 - 2 /LPF    Calcium Oxalate Few (A) NEG^Negative /HPF   Urine Culture Aerobic Bacterial     Status: None   Result Value Ref Range    Specimen Description Midstream Urine     Special Requests Specimen received in preservative     Culture Micro       <10,000 colonies/mL  urogenital luc  Susceptibility testing not routinely done           Your urinalysis looks abnormal because of your recent prostatectomy, but there is no evidence of a urinary tract infection.  It would not be helpful to give antibiotics.      I wanted you to have these results for your home records. Please let me know if you have any questions.  Otherwise followup with Dr. Casiano for your regularly scheduled appointment      Sincerely,      [facility]        [facility] UROLOGY AND [facility] FOR PROSTATE AND UROLOGIC CANCERS  [address]  [address]LO[address]  [place] [address]  Phone: [phone]  Fax: [phone]

## 2020-02-18 NOTE — Clinical Note
"2/18/2020       RE: Loy Limon  [address]  [place] [address]     Dear Colleague,    Thank you for referring your patient, Loy Limon, to the [facility] UROLOGY AND INST FOR PROSTATE AND UROLOGIC CANCERS at [facility]. Please see a copy of my visit note below.    HPI: Mr. Loy Limon is a 64 year old year old male presenting today for evaluation of chief complaint(s): RECHECK (Prostate cancer w/ prostatectomy )    Mr. Limon has PMH significant for immunosuppression d/t liver transplant.  More recently he also developed prostate cancer (4/26/19 - PSA 5.51ng/dL) and he underwent a RALP with Dr. Casiano on 1/3/20, final path showing [place] 3+4=7, [id number].  He was last seen by Dr. Casiano 1/13/20 for Cronin removal.  He was begun on pelvic floor physical therapy.  Today he returns in routine followup for a postop check and to lift activity restrictions.      VOIDING SYMPTOMS: Patient is concerned that he is leaking urine.  Leaks with coughing, standing up from a sitting position.   With sleeping, gets up every 1/2 hour with urge to void (gets up 5-8 times).  As he travels to the toilet, he begins to leak urine.  Also will leak with rolling over in bed.  With voiding, feels he is emptying because his stream is very strong.  No dysuria.  Does have skin irritation from wearing the diaper and putting paper inside his diapers. Using 2-3 diapers per day.   Has PFPT first appt scheduled for 2/26/20. Caffeine:  Only occasionally.      - Incisions: no issues  - Does feel abdominal bloating - this preceded his prostate cancer surgery but remains persistent.  Can have some \"burning\" sensation in upper abdomen.   BMs are no problem.  Last BM was yesterday, may not fully evacuate.    - Eating well.  No nausea, emesis  - No other concerns     used today.     Current Outpatient Medications   Medication Sig Dispense Refill     alfuzosin ER " "(UROXATRAL) 10 MG 24 hr tablet Take 10 mg by mouth daily       amLODIPine (NORVASC) 10 MG tablet Take 1 tablet (10 mg) by mouth daily 90 tablet 3     aspirin (ASA) 325 MG EC tablet Take 1 tablet (325 mg) by mouth daily 90 tablet 3     bisacodyl (DULCOLAX) 10 MG suppository Place 1 suppository (10 mg) rectally daily For constipation. Stop if diarrhea occurs. 10 suppository 1     blood glucose (NO BRAND SPECIFIED) lancets standard Use to test blood sugar 4 times daily or as directed. 200 each 11     blood glucose (ONETOUCH VERIO IQ) test strip Use to test blood sugar 4 times daily or as directed. 200 strip 11     glucose 40 % (400 mg/mL) gel Take 15-30 g by mouth every 15 minutes as needed for low blood sugar 30 g 3     magnesium hydroxide (MILK OF MAGNESIA) 400 MG/5ML suspension Take 30 mLs by mouth daily as needed for constipation or heartburn (Stop if diarrhea occurs) 355 mL 1     metFORMIN (GLUCOPHAGE-XR) 500 MG 24 hr tablet Take 2 tablets (1,000 mg) by mouth 2 times daily (with meals) 120 tablet 2     Metoprolol Tartrate 75 MG TABS Take 75 mg by mouth 2 times daily 60 tablet 2     mineral oil liquid Take 30 mLs by mouth daily For constipation. Stop if diarrhea occurs. 500 mL 1     multivitamin w/minerals (THERA-VIT-M) tablet Take 1 tablet by mouth daily 90 tablet 3     oxyCODONE (ROXICODONE) 5 MG tablet Take 1 tablet (5 mg) by mouth every 6 hours as needed for severe pain 20 tablet 0     Sharps Container MISC 1 each daily 1 each 2     tacrolimus (GENERIC EQUIVALENT) 1 MG capsule Take 3 capsules (3 mg) by mouth every 12 hours 180 capsule 11     ursodiol (ACTIGALL) 250 MG tablet Take 1 tablet (250 mg) by mouth 2 times daily 180 tablet 3       ALLERGIES: Patient has no known allergies.      REVIEW OF SYSTEMS:  As above in HPI    GENERAL PHYSICAL EXAM:   Vitals: BP (!) 162/88   Pulse 90   Ht 1.702 m (5' 7\")   Wt 74.8 kg (165 lb)   BMI 25.84 kg/m     Body mass index is 25.84 kg/m .    GENERAL: Well groomed, well " developed, well nourished male in NAD.  GI: Soft, NT, + firmness in upper abdomen. Soft in lower abdomen.  Multiple incisions well healed.  No hernias, erythema, tenderness.     MS: Gait normal, normal muscle tone  SKIN: Warm to touch, dry.  No visible rashes or lesions on examined areas.  NEURO: Alert and oriented x 3.  PSYCH: Normal mood and affect, pleasant and agreeable during interview and exam.     :      Inguinal: no hernias or palpable lymph nodes      Uncircumcised penis, no penile plaques or lesions. Orthotopic location of the urethral meatus. No rashes or lesions      Scrotum normal.      Testicles of normal firmness and consistency, no masses.      No groin rashes.     PVR: Residual urine by ultrasound was 10 ml.      LABS: The last test results for Mr. Loy Limon were reviewed:  PSA -   Lab Results   Component Value Date    PSA 5.51 04/26/2019    PSA 5.02 03/19/2019    PSA 5.25 03/04/2019    PSA 3.27 08/15/2018    PSA 4.32 07/19/2018     BMP -   Recent Labs   Lab Test 01/05/20 0520 01/04/20 0528 01/03/20 2034 12/18/19  0753  11/18/19  0810 11/06/19  0711    136 140 140   < > 139 138   POTASSIUM 4.5 4.9 5.2 4.5   < > 4.9 4.8   CHLORIDE 110* 105 109 110*   < > 109 108   CO2 27 23 23 27   < > 26 26   BUN 16 23 23 25   < > 17 21   CR 0.67 0.74 0.79 0.77   < > 0.72 0.87   * 206* 253* 166*   < > 153* 162*   YELENA 7.8* 7.4* 7.5* 8.4*   < > 8.4* 8.8   MAG  --   --   --  2.0  --  2.0 1.8   PHOS  --   --   --  2.7  --  2.4* 3.0    < > = values in this interval not displayed.       CBC -   Recent Labs   Lab Test 01/05/20 0520 01/04/20 0528 01/03/20 2034   WBC 5.1 7.4 7.8   HGB 11.6* 11.0* 11.9*    177 169       ASSESSMENT:   1) prostate cancer  2) urinary incontinence  3) frequency/urgency  4) Abdominal bloating    PLAN:   - Follow up with pelvic floor physical therapist  - UA micro reflex today  - PVR today   - Start Miralax RX - one dose daily - continue unless your stools become  too soft, then reduce to every other day.   - Abdominal xray  - Follow up with PCP or regarding bloating (which preceded his prostate cancer surgery)   - Follow up with Dr. Casiano in ~6 weeks with PSA at the lab first.     BASHIR Dubon-C  Department of Urologic Surgery      Again, thank you for allowing me to participate in the care of your patient.      Sincerely,    BASHIR [facility]

## 2020-02-18 NOTE — NURSING NOTE
Return-  S/p Prostatectomy     He reports of urinary leaking needing 2-3 per day, associated with coughing. He does not get the urge to void, and has 'prostate pains' occasionally.      Chief Complaint   Patient presents with     RECHECK     Prostate cancer w/ prostatectomy        There were no vitals taken for this visit. There is no height or weight on file to calculate BMI.    Patient Active Problem List   Diagnosis     Cirrhosis of liver (H)     Liver lesion     HCC (hepatocellular carcinoma) (H)     Liver transplant recipient (H)     Immunosuppressed status (H)     Hypervolemia     Atrial fibrillation with rapid ventricular response (H)     Hematuria     Bilateral wheezing     Hypoxia     Hyperglycemia     Pre-diabetes     Essential hypertension     Constipation     Biliary stricture of transplanted liver (H)     Drug-induced diabetes mellitus (H)     Muscle tension dysphonia     Laennec's cirrhosis (H)     Elevated ferritin     Inguinal hernia of right side with obstruction and without gangrene     Portal hypertension (H)     Right sided abdominal pain     Tobacco use disorder     Prostate cancer (H)       No Known Allergies    Current Outpatient Medications   Medication Sig Dispense Refill     amLODIPine (NORVASC) 10 MG tablet Take 1 tablet (10 mg) by mouth daily 90 tablet 3     aspirin (ASA) 325 MG EC tablet Take 1 tablet (325 mg) by mouth daily 90 tablet 3     bisacodyl (DULCOLAX) 10 MG suppository Place 1 suppository (10 mg) rectally daily For constipation. Stop if diarrhea occurs. 10 suppository 1     blood glucose (NO BRAND SPECIFIED) lancets standard Use to test blood sugar 4 times daily or as directed. 200 each 11     blood glucose (ONETOUCH VERIO IQ) test strip Use to test blood sugar 4 times daily or as directed. 200 strip 11     glucose 40 % (400 mg/mL) gel Take 15-30 g by mouth every 15 minutes as needed for low blood sugar 30 g 3     magnesium hydroxide (MILK OF MAGNESIA) 400 MG/5ML suspension  Take 30 mLs by mouth daily as needed for constipation or heartburn (Stop if diarrhea occurs) 355 mL 1     metFORMIN (GLUCOPHAGE-XR) 500 MG 24 hr tablet Take 2 tablets (1,000 mg) by mouth 2 times daily (with meals) 120 tablet 2     Metoprolol Tartrate 75 MG TABS Take 75 mg by mouth 2 times daily 60 tablet 2     mineral oil liquid Take 30 mLs by mouth daily For constipation. Stop if diarrhea occurs. 500 mL 1     multivitamin w/minerals (THERA-VIT-M) tablet Take 1 tablet by mouth daily 90 tablet 3     Sharps Container MISC 1 each daily 1 each 2     tacrolimus (GENERIC EQUIVALENT) 1 MG capsule Take 3 capsules (3 mg) by mouth every 12 hours 180 capsule 11     ursodiol (ACTIGALL) 250 MG tablet Take 1 tablet (250 mg) by mouth 2 times daily 180 tablet 3     alfuzosin ER (UROXATRAL) 10 MG 24 hr tablet Take 10 mg by mouth daily       oxyCODONE (ROXICODONE) 5 MG tablet Take 1 tablet (5 mg) by mouth every 6 hours as needed for severe pain (Patient not taking: Reported on 2020) 20 tablet 0       Social History     Tobacco Use     Smoking status: Former Smoker     Packs/day: 0.03     Years: 20.00     Pack years: 0.60     Types: Cigarettes, Cigars     Start date: 1970     Last attempt to quit: 3/14/2018     Years since quittin.9     Smokeless tobacco: Never Used     Tobacco comment: Quit smoking 2018, one cigarette after dinner   Substance Use Topics     Alcohol use: No     Frequency: Never     Comment: Quit drinking 2018     Drug use: No       Rodney Koenig, EMT, EMT  2020  8:38 AM

## 2020-02-18 NOTE — PATIENT INSTRUCTIONS
- Follow up with pelvic floor physical therapist  - Urinalysis today to look for infection  - You empty your bladder fine  - Start Miralax RX - one dose daily - continue unless your stools become too soft, then reduce to every other day.   - Abdominal xray today  - Follow up with PCP or regarding bloating (which preceded his prostate cancer surgery)   - Follow up with Dr. Casiano in ~6 weeks with PSA at the lab first.     [facility]

## 2020-02-19 LAB
BACTERIA SPEC CULT: NORMAL
Lab: NORMAL
SPECIMEN SOURCE: NORMAL

## 2020-02-20 RX ORDER — ALFUZOSIN HYDROCHLORIDE 10 MG/1
10 TABLET, EXTENDED RELEASE ORAL DAILY
Qty: 90 TABLET | Refills: 3 | Status: SHIPPED | OUTPATIENT
Start: 2020-02-20 | End: 2020-09-25

## 2020-02-20 NOTE — TELEPHONE ENCOUNTER
ALFUZOSIN ER 10MG TABLETS    Last Written Prescription Date:  ?  Last Fill Quantity: ?,   # refills: ?  Last Office Visit : 1/13/2020  Future Office visit:  7/15/2020    Routing refill request to provider for review/approval because:  Medication is reported/historical    Belkys Rodriguez RN  Central Triage Red Flags/Med Refills

## 2020-02-26 ENCOUNTER — THERAPY VISIT (OUTPATIENT)
Dept: PHYSICAL THERAPY | Facility: CLINIC | Age: 65
End: 2020-02-26
Attending: UROLOGY
Payer: COMMERCIAL

## 2020-02-26 DIAGNOSIS — R32 URINARY INCONTINENCE: Primary | ICD-10-CM

## 2020-02-26 DIAGNOSIS — C61 PROSTATE CANCER (H): ICD-10-CM

## 2020-02-26 PROCEDURE — 97110 THERAPEUTIC EXERCISES: CPT | Mod: GP | Performed by: PHYSICAL THERAPIST

## 2020-02-26 PROCEDURE — 97161 PT EVAL LOW COMPLEX 20 MIN: CPT | Mod: GP | Performed by: PHYSICAL THERAPIST

## 2020-02-26 PROCEDURE — 97535 SELF CARE MNGMENT TRAINING: CPT | Mod: GP | Performed by: PHYSICAL THERAPIST

## 2020-02-26 PROCEDURE — 97112 NEUROMUSCULAR REEDUCATION: CPT | Mod: GP | Performed by: PHYSICAL THERAPIST

## 2020-02-26 NOTE — PROGRESS NOTES
[facility] Initial Evaluation  Subjective:  The history is provided by the patient. A  was used.   Therapist Generated HPI Evaluation  Problem details: PMH significant for immunosuppression d/t liver transplant.   Prostate cancer (4/26/19 - PSA 5.51ng/dL); underwent a RALP with Dr. Casiano on 1/3/20, final path showing [place] 3+4=7, [id number].  Cath removal 1/13/20.      Patient reports leakage with activities such as standing up out of a chair, lifting, sneezing, or walking to the bathroom.  He also notes more urge during the night and reports waking approximately 10 times between 9 PM and 4:30 AM to void.  He does not note as much voiding during the daytime but indicates that he never feels like his bladder is very full.  He is currently wearing several diapers over 24-hour.  And he is able to delay urination approximately 30 minutes.  He does report he is able to stop the flow of urine on the toilet and that volume of urine passed is usually large, although he does report straining and a slow stream occasionally.  He has no difficulty with initiating stream.  He had a UA on 2/18 with negative culture and PVR 10mL  His water intake is low at approximately 2 glasses/day and he does report intermittent constipation.  He is not sexually active..         Type of problem:  Incontinence.    This is a new condition.  Condition occurred with:  After surgery.    Patient reports pain:  N/a.    Pain is the same all the time.  Since onset symptoms are unchanged.  Symptoms are exacerbated by laughing, sitting, walking and coughing            Physical Exam                    Objective:  System                                 Pelvic Dysfunction Evaluation:                  External Assessment:              Muscle Contraction/Perineal Mobility:  Elevation and urogential triangle descent and substitution  Internal Assessment:      Contraction/Grade:  Fair squeeze, definite lift  (3)    Accessory Muscle use-Gluteals:  Significant    Symmetry of Contraction Response:  Poor coordination of abdominals with PF recruitment, with increases in IAP with attempts to contract and with transfers  SEMG Biofeedback:          Baseline EMG PM:  5-6 mV with no rise appreciated with cuing to contract.  Abdominal resting is 5-6 mV with rise to 10-12 mV with cuing for PF contraction          Position:  Sitting and sidelying                     General     ROS    ASSESSMENT/PLAN:    Patient is a 64 year old male with pelvic complaints.    Patient has the following significant findings with corresponding treatment plan.                Diagnosis 1:  S/p Prostatectomy  Pain -  manual therapy, self management, education, directional preference exercise and home program  Decreased ROM/flexibility - manual therapy and therapeutic exercise  Decreased joint mobility - manual therapy and therapeutic exercise  Decreased strength - therapeutic exercise and therapeutic activities  Decreased proprioception - neuro re-education and therapeutic activities  Impaired gait - gait training  Impaired muscle performance - neuro re-education  Decreased function - therapeutic activities, self care  Impaired posture - neuro re-education    Previous and current functional limitations:  (See Goal Flow Sheet for this information)    Short term and Long term goals: (See Goal Flow Sheet for this information)     Communication ability:  Patient appears to be able to clearly communicate and understand verbal and written communication and follow directions correctly.  Treatment Explanation - The following has been discussed with the patient:   RX ordered/plan of care  Anticipated outcomes  Possible risks and side effects  This patient would benefit from PT intervention to resume normal activities.   Rehab potential is good.    Frequency:  1X week, once daily  Duration:  for 12 weeks  Discharge Plan:  Achieve all LTG.  Independent in home  treatment program.  Reach maximal therapeutic benefit.    Please refer to the daily flowsheet for treatment today, total treatment time and time spent performing 1:1 timed codes.

## 2020-03-02 ENCOUNTER — APPOINTMENT (OUTPATIENT)
Dept: INTERPRETER SERVICES | Facility: CLINIC | Age: 65
End: 2020-03-02
Payer: COMMERCIAL

## 2020-03-02 ENCOUNTER — TELEPHONE (OUTPATIENT)
Dept: ENDOCRINOLOGY | Facility: CLINIC | Age: 65
End: 2020-03-02

## 2020-03-02 NOTE — TELEPHONE ENCOUNTER
[facility] Call Center    Phone Message    May a detailed message be left on voicemail: yes     Reason for Call: Other: Pt called stating that he is completely out of his medication/ supplies. Pt could not tell me what it was called. He tried describing it to me and even then i couldn't  on what he was looking for. Please follow up with pt, pt does need an .      Action Taken: Message routed to:  [facility] ([facility]): Endo    Travel Screening: Not Applicable

## 2020-03-03 NOTE — TELEPHONE ENCOUNTER
Spoke with patient thru  patient spoke with PCP and was able to get diabetic supply order to  for tomorrow from pharmacy.

## 2020-03-05 ENCOUNTER — PRE VISIT (OUTPATIENT)
Dept: UROLOGY | Facility: CLINIC | Age: 65
End: 2020-03-05

## 2020-03-10 ENCOUNTER — THERAPY VISIT (OUTPATIENT)
Dept: PHYSICAL THERAPY | Facility: CLINIC | Age: 65
End: 2020-03-10
Payer: COMMERCIAL

## 2020-03-10 DIAGNOSIS — R32 URINARY INCONTINENCE: Primary | ICD-10-CM

## 2020-03-10 PROCEDURE — 97110 THERAPEUTIC EXERCISES: CPT | Mod: GP | Performed by: PHYSICAL THERAPIST

## 2020-03-10 PROCEDURE — 97112 NEUROMUSCULAR REEDUCATION: CPT | Mod: GP | Performed by: PHYSICAL THERAPIST

## 2020-03-11 ENCOUNTER — TELEPHONE (OUTPATIENT)
Dept: ENDOCRINOLOGY | Facility: CLINIC | Age: 65
End: 2020-03-11

## 2020-03-11 ENCOUNTER — OFFICE VISIT (OUTPATIENT)
Dept: ENDOCRINOLOGY | Facility: CLINIC | Age: 65
End: 2020-03-11
Payer: COMMERCIAL

## 2020-03-11 VITALS
WEIGHT: 189.3 LBS | SYSTOLIC BLOOD PRESSURE: 126 MMHG | HEART RATE: 61 BPM | DIASTOLIC BLOOD PRESSURE: 74 MMHG | HEIGHT: 67 IN | BODY MASS INDEX: 29.71 KG/M2

## 2020-03-11 DIAGNOSIS — E11.65 UNCONTROLLED TYPE 2 DIABETES MELLITUS WITH HYPERGLYCEMIA (H): ICD-10-CM

## 2020-03-11 DIAGNOSIS — E03.8 OTHER SPECIFIED HYPOTHYROIDISM: Primary | ICD-10-CM

## 2020-03-11 DIAGNOSIS — E03.8 OTHER SPECIFIED HYPOTHYROIDISM: ICD-10-CM

## 2020-03-11 LAB
CREAT SERPL-MCNC: 0.71 MG/DL (ref 0.66–1.25)
GFR SERPL CREATININE-BSD FRML MDRD: >90 ML/MIN/{1.73_M2}
T4 FREE SERPL-MCNC: 0.94 NG/DL (ref 0.76–1.46)
TSH SERPL DL<=0.005 MIU/L-ACNC: 8.72 MU/L (ref 0.4–4)

## 2020-03-11 RX ORDER — LEVOTHYROXINE SODIUM 25 UG/1
25 TABLET ORAL DAILY
Qty: 90 TABLET | Refills: 3 | Status: SHIPPED | OUTPATIENT
Start: 2020-03-11 | End: 2020-07-16 | Stop reason: DRUGHIGH

## 2020-03-11 ASSESSMENT — ENCOUNTER SYMPTOMS
SPEECH CHANGE: 0
HEADACHES: 0
NUMBNESS: 0
SEIZURES: 0
LOSS OF CONSCIOUSNESS: 0
TREMORS: 0
DISTURBANCES IN COORDINATION: 0
WEAKNESS: 0
MEMORY LOSS: 1
TINGLING: 0
PARALYSIS: 0
DIZZINESS: 0

## 2020-03-11 ASSESSMENT — MIFFLIN-ST. JEOR: SCORE: 1597.29

## 2020-03-11 ASSESSMENT — PAIN SCALES - GENERAL: PAINLEVEL: NO PAIN (0)

## 2020-03-11 NOTE — PATIENT INSTRUCTIONS
Continue Metformin 2 pills twice a day.  Check your fasting blood sugar each am and before evening meal DAILY.  Work on weight loss. Decrease your food portions, make healthy food choices and start walking.  See the eye doctor.  Check your blood pressure at home- I would like to see your blood pressure less than 130/80.  Notify me if your BP is persistently higher than 130/80.  Have your labs done today ( check thyroid blood tests and kidney blood tests). I will let you know the results.    Pt will see PCP for his diabetes which is well control on Metformin to reduce his financial burden and appointments.  Gilma Guthrie PA-C

## 2020-03-11 NOTE — LETTER
3/11/2020       RE: Loy Limon  [address]  [facility] [address]     Dear Colleague,    Thank you for referring your patient, Loy Limon, to the [facility] ENDOCRINOLOGY at [facility]. Please see a copy of my visit note below.    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  He was last seen in our clinic by me in Oct 2019 and  Roma Lira PA-C in 7/2019.  Pt's has a hx of Laennec's cirrhosis with HCC, portal HTN, latent TB who underwent living donor transplant on 12/22/218.  He developed steroid/immunosuppressant induced diabetes and was treated with NPH insulin which was discontinued once he was no longer taking steroids.  Later follow up showed an A1C value of 8.5 % in May 2019.  He was started on low dose Metformin which was increased last visit to Metformin 500 mg 2 tabs po each am and 2 tabs po each pm.  He denies missing any Metformin doses.  His medical hx is also significant for hx of prostate cancer s/p prostatectomy. He also has HTN, hx of postop atrial fib, anemia and latent TB.  His A1C was 7.2 % on 1/3/2020 and his previous A1C  was 7.3 % .  I reviewed his blood sugar data today and some of his blood sugar readings are after eating.  His average glucose was 180 with SD 31.  I asked him to check his blood sugar fasting each am and predinner daily.  On ROS today, since his prostates surgery he has urinary incontinence, frequency, urgency and abd bloating. He is doing his PT exercises.  His weight is up 5 lbs since Oct 2019.  Pt has numbness in both feet.  He denies frequent headaches, blurred vision, n/v, SOB at rest, cough, chest pain, diarrhea, dysuria or hematuria.  No foot ulcers.    Diabetes Care  Retinopathy: none; pt seen by Oph here in 2/2019.  Nephropathy: yes; urine microalbuminuria + in Oct 2020. Will recheck.   Neuropathy: yes.  Foot Exam: no ulcers.  Taking  aspirin: yes.  Lipids:LDL 71 in Oct 2019.    ROS  Please see under HPI.    Allergies  No Known Allergies    Medications  Current Outpatient Medications   Medication Sig Dispense Refill     alfuzosin ER 10 MG PO 24 hr tablet Take 1 tablet (10 mg) by mouth daily DO NOT CRUSH. 90 tablet 3     amLODIPine (NORVASC) 10 MG tablet Take 1 tablet (10 mg) by mouth daily 90 tablet 3     aspirin (ASA) 325 MG EC tablet Take 1 tablet (325 mg) by mouth daily 90 tablet 3     bisacodyl (DULCOLAX) 10 MG suppository Place 1 suppository (10 mg) rectally daily For constipation. Stop if diarrhea occurs. 10 suppository 1     blood glucose (NO BRAND SPECIFIED) lancets standard Use to test blood sugar 4 times daily or as directed. 200 each 11     blood glucose (ONETOUCH VERIO IQ) test strip Use to test blood sugar 4 times daily or as directed. 200 strip 11     glucose 40 % (400 mg/mL) gel Take 15-30 g by mouth every 15 minutes as needed for low blood sugar 30 g 3     magnesium hydroxide (MILK OF MAGNESIA) 400 MG/5ML suspension Take 30 mLs by mouth daily as needed for constipation or heartburn (Stop if diarrhea occurs) 355 mL 1     metFORMIN (GLUCOPHAGE-XR) 500 MG 24 hr tablet Take 2 tablets (1,000 mg) by mouth 2 times daily (with meals) 120 tablet 2     Metoprolol Tartrate 75 MG TABS Take 75 mg by mouth 2 times daily 60 tablet 2     mineral oil liquid Take 30 mLs by mouth daily For constipation. Stop if diarrhea occurs. 500 mL 1     multivitamin w/minerals (THERA-VIT-M) tablet Take 1 tablet by mouth daily 90 tablet 3     polyethylene glycol (MIRALAX) packet Take 17 g by mouth daily 30 packet 3     Sharps Container MISC 1 each daily 1 each 2     tacrolimus (GENERIC EQUIVALENT) 1 MG capsule Take 3 capsules (3 mg) by mouth every 12 hours 180 capsule 11     ursodiol (ACTIGALL) 250 MG tablet Take 1 tablet (250 mg) by mouth 2 times daily 180 tablet 3     oxyCODONE (ROXICODONE) 5 MG tablet Take 1 tablet (5 mg) by mouth every 6 hours as needed for  severe pain (Patient not taking: Reported on 2020) 20 tablet 0       Family History  family history includes Liver Disease in his brother; Memory loss in his mother.    Social History   reports that he quit smoking about 1 years ago. His smoking use included cigarettes and cigars. He started smoking about 49 years ago. He has a 0.60 pack-year smoking history. He has never used smokeless tobacco. He reports that he does not drink alcohol or use drugs.     Past Medical History  Past Medical History:   Diagnosis Date     Anemia      Biliary stricture of transplanted liver (H) 2018     BPH (benign prostatic hyperplasia)      Cancer (H)     hepatocellualr carcinoma     Cirrhosis of liver (H) 2018     Diabetes (H)      Enlarged prostate      Hypothyroidism      Inguinal hernia     Repaired with mesh on 18     Liver lesion 2018     Liver transplanted (H) 2018     donor liver transplant     Portal vein thrombosis     on path explant     Postoperative atrial fibrillation (H) 2018     Prostate cancer (H)      TB lung, latent     Treated        Past Surgical History:   Procedure Laterality Date     APPENDECTOMY       COLONOSCOPY           DAVINCI PROSTATECTOMY N/A 1/3/2020    Procedure: Robot-assisted laparoscopic radical prostatectomy, Bladder biopsy;  Surgeon: Kenrick Casiano MD;  Location: [facility]     ENDOSCOPIC RETROGRADE CHOLANGIOPANCREATOGRAM N/A 2018    Procedure: ENDOSCOPIC RETROGRADE CHOLANGIOPANCREATOGRAM, with Billary Sphincterotomy and Billary Stent Placement;  Surgeon: Omero Lawler MD;  Location: [facility] OR     ENDOSCOPIC RETROGRADE CHOLANGIOPANCREATOGRAM  2019    Procedure: COMBINED ENDOSCOPIC RETROGRADE CHOLANGIOPANCREATOGRAPHY, Bile Duct Stent Exchange;  Surgeon: Omero Lawler MD;  Location: [facility] OR     ENDOSCOPIC RETROGRADE CHOLANGIOPANCREATOGRAM N/A 2019    Procedure: ENDOSCOPIC RETROGRADE CHOLANGIOPANCREATOGRAPHY, WITH  "BILIARY STENT REMOVAL AND STONE EXTRACTION;  Surgeon: Omero Lawler MD;  Location: [facility] OR     HERNIORRHAPHY INGUINAL  2018    Procedure: Herniorrhaphy inguinal;  Surgeon: Emil Echevarria MD;  Location: [facility] OR     IR LIVER BIOPSY PERCUTANEOUS  2018     LAPAROTOMY EXPLORATORY      per the patient \"for infection in the LLQ\"      TRANSPLANT LIVER RECIPIENT  DONOR N/A 2018    Procedure: TRANSPLANT LIVER RECIPIENT  DONOR;  Surgeon: Emil Echevarria MD;  Location: [facility] OR       Physical Exam  /74   Pulse 61   Ht 1.702 m (5' 7\")   Wt 85.9 kg (189 lb 4.8 oz)   BMI 29.65 kg/m    Body mass index is 29.65 kg/m .    GENERAL : In no apparent distress  FEET:  No ulcers.    RESULTS  Creatinine   Date Value Ref Range Status   2020 0.67 0.66 - 1.25 mg/dL Final     GFR Estimate   Date Value Ref Range Status   2020 >90 >60 mL/min/[1.73_m2] Final     Comment:     Non  GFR Calc  Starting 2018, serum creatinine based estimated GFR (eGFR) will be   calculated using the Chronic Kidney Disease Epidemiology Collaboration   (CKD-EPI) equation.       Hemoglobin A1C   Date Value Ref Range Status   2020 7.2 (H) 0 - 5.6 % Final     Comment:     Normal <5.7% Prediabetes 5.7-6.4%  Diabetes 6.5% or higher - adopted from ADA   consensus guidelines.       Potassium   Date Value Ref Range Status   2020 4.5 3.4 - 5.3 mmol/L Final     ALT   Date Value Ref Range Status   2019 23 0 - 70 U/L Final     AST   Date Value Ref Range Status   2019 9 0 - 45 U/L Final     TSH   Date Value Ref Range Status   10/16/2019 7.66 (H) 0.40 - 4.00 mU/L Final     T4 Free   Date Value Ref Range Status   10/16/2019 0.91 0.76 - 1.46 ng/dL Final       Cholesterol   Date Value Ref Range Status   10/23/2019 126 <200 mg/dL Final   2019 128 <200 mg/dL Final     HDL Cholesterol   Date Value Ref Range Status   10/23/2019 38 (L) >39 mg/dL Final   2019 46 >39 " mg/dL Final     LDL Cholesterol Calculated   Date Value Ref Range Status   10/23/2019 71 <100 mg/dL Final     Comment:     Desirable:       <100 mg/dl   09/04/2019 66 <100 mg/dL Final     Comment:     Desirable:       <100 mg/dl     Triglycerides   Date Value Ref Range Status   10/23/2019 89 <150 mg/dL Final   09/04/2019 83 <150 mg/dL Final     A1C 7.2 % today.    ASSESSMENT/PLAN:    1. STEROID/IMMUNOSUPPRESSANT INDUCED DIABETES:  Pt is to remain on Metformin 500 mg 2 tabs po BID.  His A1C is 7.2 % today.  I asked him to work on weight loss, reduce food portions and start walking.  He would like to reduce his number of medical appointment due to finances.  I asked him to see his PCP for his diabetes care.  Will refer to Oph for annual diabetic eye exam.  Will also recheck urine microalbuminuria and creat/GFR today.  His LDL was 71 in Oct 2019.  Pt had the flu vaccine this season.    2.  NEUROPATHY: Mild. Pt is taking Gabapentin daily.  No foot ulcers.    3  PROTEINURIA: Recheck urine microalbuminuria today.    4.  LIVER TRANSPLANT: Pt followed here.    5.  PROSTATE CANCER: Pt being followed here.    6.  THYROID STATUS: TSH slightly elevated at 7.66 mU/L with a normal FT4 in Oct 2020.  He is not taking Levothyroxine that was prescribed.  His TSH is 8.72 mU/L today.  Pt instructed to take Levothyroxine 25 mcg po daily and I ordered recheck TSH/FT4 to be done in 10 weeks.    7.  Return to Endocrine Clinic- pt will be seeing his PCP for his diabetes care as above.        Again, thank you for allowing me to participate in the care of your patient.      Sincerely,    Gilma Guthrie PA-C

## 2020-03-11 NOTE — PROGRESS NOTES
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  He was last seen in our clinic by me in Oct 2019 and  Roma Lira PA-C in 7/2019.  Pt's has a hx of Laennec's cirrhosis with HCC, portal HTN, latent TB who underwent living donor transplant on 12/22/218.  He developed steroid/immunosuppressant induced diabetes and was treated with NPH insulin which was discontinued once he was no longer taking steroids.  Later follow up showed an A1C value of 8.5 % in May 2019.  He was started on low dose Metformin which was increased last visit to Metformin 500 mg 2 tabs po each am and 2 tabs po each pm.  He denies missing any Metformin doses.  His medical hx is also significant for hx of prostate cancer s/p prostatectomy. He also has HTN, hx of postop atrial fib, anemia and latent TB.  His A1C was 7.2 % on 1/3/2020 and his previous A1C  was 7.3 % .  I reviewed his blood sugar data today and some of his blood sugar readings are after eating.  His average glucose was 180 with SD 31.  I asked him to check his blood sugar fasting each am and predinner daily.  On ROS today, since his prostates surgery he has urinary incontinence, frequency, urgency and abd bloating. He is doing his PT exercises.  His weight is up 5 lbs since Oct 2019.  Pt has numbness in both feet.  He denies frequent headaches, blurred vision, n/v, SOB at rest, cough, chest pain, diarrhea, dysuria or hematuria.  No foot ulcers.    Diabetes Care  Retinopathy: none; pt seen by Oph here in 2/2019.  Nephropathy: yes; urine microalbuminuria + in Oct 2020. Will recheck.   Neuropathy: yes.  Foot Exam: no ulcers.  Taking aspirin: yes.  Lipids:LDL 71 in Oct 2019.    ROS  Please see under HPI.    Allergies  No Known Allergies    Medications  Current Outpatient Medications   Medication Sig Dispense Refill     alfuzosin ER 10 MG PO 24 hr tablet Take 1 tablet (10 mg) by mouth daily DO NOT CRUSH. 90 tablet 3      amLODIPine (NORVASC) 10 MG tablet Take 1 tablet (10 mg) by mouth daily 90 tablet 3     aspirin (ASA) 325 MG EC tablet Take 1 tablet (325 mg) by mouth daily 90 tablet 3     bisacodyl (DULCOLAX) 10 MG suppository Place 1 suppository (10 mg) rectally daily For constipation. Stop if diarrhea occurs. 10 suppository 1     blood glucose (NO BRAND SPECIFIED) lancets standard Use to test blood sugar 4 times daily or as directed. 200 each 11     blood glucose (ONETOUCH VERIO IQ) test strip Use to test blood sugar 4 times daily or as directed. 200 strip 11     glucose 40 % (400 mg/mL) gel Take 15-30 g by mouth every 15 minutes as needed for low blood sugar 30 g 3     magnesium hydroxide (MILK OF MAGNESIA) 400 MG/5ML suspension Take 30 mLs by mouth daily as needed for constipation or heartburn (Stop if diarrhea occurs) 355 mL 1     metFORMIN (GLUCOPHAGE-XR) 500 MG 24 hr tablet Take 2 tablets (1,000 mg) by mouth 2 times daily (with meals) 120 tablet 2     Metoprolol Tartrate 75 MG TABS Take 75 mg by mouth 2 times daily 60 tablet 2     mineral oil liquid Take 30 mLs by mouth daily For constipation. Stop if diarrhea occurs. 500 mL 1     multivitamin w/minerals (THERA-VIT-M) tablet Take 1 tablet by mouth daily 90 tablet 3     polyethylene glycol (MIRALAX) packet Take 17 g by mouth daily 30 packet 3     Sharps Container MISC 1 each daily 1 each 2     tacrolimus (GENERIC EQUIVALENT) 1 MG capsule Take 3 capsules (3 mg) by mouth every 12 hours 180 capsule 11     ursodiol (ACTIGALL) 250 MG tablet Take 1 tablet (250 mg) by mouth 2 times daily 180 tablet 3     oxyCODONE (ROXICODONE) 5 MG tablet Take 1 tablet (5 mg) by mouth every 6 hours as needed for severe pain (Patient not taking: Reported on 2/18/2020) 20 tablet 0       Family History  family history includes Liver Disease in his brother; Memory loss in his mother.    Social History   reports that he quit smoking about 1 years ago. His smoking use included cigarettes and cigars. He  started smoking about 49 years ago. He has a 0.60 pack-year smoking history. He has never used smokeless tobacco. He reports that he does not drink alcohol or use drugs.     Past Medical History  Past Medical History:   Diagnosis Date     Anemia      Biliary stricture of transplanted liver (H) 2018     BPH (benign prostatic hyperplasia)      Cancer (H)     hepatocellualr carcinoma     Cirrhosis of liver (H) 2018     Diabetes (H)      Enlarged prostate      Hypothyroidism      Inguinal hernia     Repaired with mesh on 18     Liver lesion 2018     Liver transplanted (H) 2018     donor liver transplant     Portal vein thrombosis     on path explant     Postoperative atrial fibrillation (H) 2018     Prostate cancer (H)      TB lung, latent     Treated        Past Surgical History:   Procedure Laterality Date     APPENDECTOMY       COLONOSCOPY           DAVINCI PROSTATECTOMY N/A 1/3/2020    Procedure: Robot-assisted laparoscopic radical prostatectomy, Bladder biopsy;  Surgeon: Kenrick Casiano MD;  [facility]     ENDOSCOPIC RETROGRADE CHOLANGIOPANCREATOGRAM N/A 2018    Procedure: ENDOSCOPIC RETROGRADE CHOLANGIOPANCREATOGRAM, with Billary Sphincterotomy and Billary Stent Placement;  Surgeon: Omero Lawler MD;  [facility]     ENDOSCOPIC RETROGRADE CHOLANGIOPANCREATOGRAM  2019    Procedure: COMBINED ENDOSCOPIC RETROGRADE CHOLANGIOPANCREATOGRAPHY, Bile Duct Stent Exchange;  Surgeon: Omero Lawler MD;  [facility]     ENDOSCOPIC RETROGRADE CHOLANGIOPANCREATOGRAM N/A 2019    Procedure: ENDOSCOPIC RETROGRADE CHOLANGIOPANCREATOGRAPHY, WITH BILIARY STENT REMOVAL AND STONE EXTRACTION;  Surgeon: Omero Lawler MD;  [facility]     HERNIORRHAPHY INGUINAL  2018    Procedure: Herniorrhaphy inguinal;  Surgeon: Emil Echevarria MD;  [facility]     IR LIVER BIOPSY PERCUTANEOUS  2018     LAPAROTOMY  "EXPLORATORY      per the patient \"for infection in the LLQ\"      TRANSPLANT LIVER RECIPIENT  DONOR N/A 2018    Procedure: TRANSPLANT LIVER RECIPIENT  DONOR;  Surgeon: Emil Echevarria MD;  [facility]       Physical Exam  /74   Pulse 61   Ht 1.702 m (5' 7\")   Wt 85.9 kg (189 lb 4.8 oz)   BMI 29.65 kg/m    Body mass index is 29.65 kg/m .    GENERAL : In no apparent distress  FEET:  No ulcers.    RESULTS  Creatinine   Date Value Ref Range Status   2020 0.67 0.66 - 1.25 mg/dL Final     GFR Estimate   Date Value Ref Range Status   2020 >90 >60 mL/min/[1.73_m2] Final     Comment:     Non  GFR Calc  Starting 2018, serum creatinine based estimated GFR (eGFR) will be   calculated using the Chronic Kidney Disease Epidemiology Collaboration   (CKD-EPI) equation.       Hemoglobin A1C   Date Value Ref Range Status   2020 7.2 (H) 0 - 5.6 % Final     Comment:     Normal <5.7% Prediabetes 5.7-6.4%  Diabetes 6.5% or higher - adopted from ADA   consensus guidelines.       Potassium   Date Value Ref Range Status   2020 4.5 3.4 - 5.3 mmol/L Final     ALT   Date Value Ref Range Status   2019 23 0 - 70 U/L Final     AST   Date Value Ref Range Status   2019 9 0 - 45 U/L Final     TSH   Date Value Ref Range Status   10/16/2019 7.66 (H) 0.40 - 4.00 mU/L Final     T4 Free   Date Value Ref Range Status   10/16/2019 0.91 0.76 - 1.46 ng/dL Final       Cholesterol   Date Value Ref Range Status   10/23/2019 126 <200 mg/dL Final   2019 128 <200 mg/dL Final     HDL Cholesterol   Date Value Ref Range Status   10/23/2019 38 (L) >39 mg/dL Final   2019 46 >39 mg/dL Final     LDL Cholesterol Calculated   Date Value Ref Range Status   10/23/2019 71 <100 mg/dL Final     Comment:     Desirable:       <100 mg/dl   2019 66 <100 mg/dL Final     Comment:     Desirable:       <100 mg/dl     Triglycerides   Date Value Ref Range Status "   10/23/2019 89 <150 mg/dL Final   09/04/2019 83 <150 mg/dL Final     A1C 7.2 % today.    ASSESSMENT/PLAN:    1. STEROID/IMMUNOSUPPRESSANT INDUCED DIABETES:  Pt is to remain on Metformin 500 mg 2 tabs po BID.  His A1C is 7.2 % today.  I asked him to work on weight loss, reduce food portions and start walking.  He would like to reduce his number of medical appointment due to finances.  I asked him to see his PCP for his diabetes care.  Will refer to Oph for annual diabetic eye exam.  Will also recheck urine microalbuminuria and creat/GFR today.  His LDL was 71 in Oct 2019.  Pt had the flu vaccine this season.    2.  NEUROPATHY: Mild. Pt is taking Gabapentin daily.  No foot ulcers.    3  PROTEINURIA: Recheck urine microalbuminuria today.    4.  LIVER TRANSPLANT: Pt followed here.    5.  PROSTATE CANCER: Pt being followed here.    6.  THYROID STATUS: TSH slightly elevated at 7.66 mU/L with a normal FT4 in Oct 2020.  He is not taking Levothyroxine that was prescribed.  His TSH is 8.72 mU/L today.  Pt instructed to take Levothyroxine 25 mcg po daily and I ordered recheck TSH/FT4 to be done in 10 weeks.    7.  Return to Endocrine Clinic- pt will be seeing his PCP for his diabetes care as above.

## 2020-03-11 NOTE — TELEPHONE ENCOUNTER
He tells me that he had been on levothyroxine in the past but was told he did not need it.  WIth the help of  he will  this medication and take it  In the AM fasting  with sips of water. He does not consume  Soy products  . I will call him in 2.5 months to have labs done again.  I explained normal values and his values. He tells me he  has been more tired with weight gain so  Knows that he does need this medication. Pharmacy verified as correct. Nikki Begum RN on 3/11/2020 at 6:31 PM

## 2020-03-11 NOTE — TELEPHONE ENCOUNTER
----- Message from Gilma Guthrie PA-C sent at 3/11/2020 10:30 AM CDT -----  Could you call pt with  to let him know his kidney blood test from today is normal, but his thyroid blood test indicates he needs to take Levothyroxine 25 mcg po daily and have his TSH rechecked in 10 weeks.  I'll order both .  Thanks burton

## 2020-03-20 ENCOUNTER — APPOINTMENT (OUTPATIENT)
Dept: INTERPRETER SERVICES | Facility: CLINIC | Age: 65
End: 2020-03-20
Payer: COMMERCIAL

## 2020-03-25 ENCOUNTER — TELEPHONE (OUTPATIENT)
Dept: OPHTHALMOLOGY | Facility: CLINIC | Age: 65
End: 2020-03-25

## 2020-03-25 LAB — LAB SCANNED RESULT: NORMAL

## 2020-03-30 DIAGNOSIS — C61 PROSTATE CANCER (H): ICD-10-CM

## 2020-03-30 LAB — PSA SERPL-MCNC: <0.01 UG/L (ref 0–4)

## 2020-04-06 ENCOUNTER — VIRTUAL VISIT (OUTPATIENT)
Dept: UROLOGY | Facility: CLINIC | Age: 65
End: 2020-04-06
Payer: COMMERCIAL

## 2020-04-06 DIAGNOSIS — C61 PROSTATE CANCER (H): Primary | ICD-10-CM

## 2020-04-06 RX ORDER — LANCETS 33 GAUGE
EACH MISCELLANEOUS
COMMUNITY
Start: 2020-03-03 | End: 2022-10-21

## 2020-04-06 RX ORDER — POLYETHYLENE GLYCOL 3350 17 G/17G
POWDER, FOR SOLUTION ORAL
COMMUNITY
Start: 2020-02-18 | End: 2021-06-18

## 2020-04-06 RX ORDER — MULTIVITAMIN WITH FOLIC ACID 400 MCG
TABLET ORAL
COMMUNITY
Start: 2020-03-18 | End: 2021-01-07

## 2020-04-06 ASSESSMENT — PAIN SCALES - GENERAL: PAINLEVEL: NO PAIN (0)

## 2020-04-06 NOTE — NURSING NOTE
Chief Complaint   Patient presents with     RECHECK     Prostate cancer follow up with PSA     Gilma Lee, CMA

## 2020-04-06 NOTE — LETTER
April 6, 2020    RE:  Loy Limon                              [address]   [place] [facility] [place] [address]            To whom it may concern:    Loy Limon is under my professional care for a medical condition. He may return to work without restriction as of 4/6/20.  Please contact my office with any questions.      Sincerely,        Kenrick Casiano MD, PhD

## 2020-04-06 NOTE — PROGRESS NOTES
REASON FOR CALL: Prostate cancer.    HPI: Mr. Loy Limon is a 64 year old year old male presenting today for Prostate cancer s/p prostatectomy.     Mr. Limon has PMH significant for immunosuppression d/t liver transplant.  More recently he also developed prostate cancer (4/26/19 - PSA 5.51ng/dL) and he underwent a RALP with Dr. Casiano on 1/3/20, final path showing Rebecca 3+4=7, cT4bLCPU.  He was last seen by Dr. Casiano 1/13/20 for Cronin removal.  He was begun on pelvic floor physical therapy.  His urinary control is improving.  He still has small leaks with some activities.  He continues to do his exercises.  He recently had a PSA.  He wishes to go back to work.    OBJECTIVE: PSA from 3/30/20 is undetectable.    ASSESSMENT AND PLAN:  Over half of today's 22-minute call was spent counseling the patient regarding his prostate cancer.  PSA is undetectable.  DECIPHER is in high risk group.  Will recheck PSA in 3 months.  Will provide a back to work letter for the patient.  He has no restrictions now.

## 2020-04-06 NOTE — PATIENT INSTRUCTIONS
PSA in 3 months and follow up with Kerri.    For all medical records requests, please call [phone].    Return to work letter is enclosed.    It was a pleasure meeting with you today.  Thank you for allowing me and my team the privilege of caring for you today.  YOU are the reason we are here, and I truly hope we provided you with the excellent service you deserve.  Please let us know if there is anything else we can do for you so that we can be sure you are leaving completely satisfied with your care experience.        Gilma Lee, [facility]

## 2020-04-15 DIAGNOSIS — E11.9 DM TYPE 2, GOAL HBA1C 7%-8% (H): ICD-10-CM

## 2020-04-16 RX ORDER — METFORMIN HCL 500 MG
1000 TABLET, EXTENDED RELEASE 24 HR ORAL 2 TIMES DAILY WITH MEALS
Qty: 360 TABLET | Refills: 2 | Status: SHIPPED | OUTPATIENT
Start: 2020-04-16 | End: 2021-01-07

## 2020-04-22 NOTE — PROGRESS NOTES
Discharge Note    Progress reporting period is from initial evaluation date Feb 26 to Mar 10, 2020.      Loy failed to follow up and current status is unknown.  Please see information below for last relevant information on current status.  Patient seen for 2 visits.  Patient was unable to be seen for follow-up in clinic due to CDC guidelines and Covid 19.    SUBJECTIVE  Subjective changes noted by patient:  A little improvement in leakage.Using less pads.  COncerned about returning to work with lifting restrictions.  .  Current pain level is 0/10.     Previous pain level was   .   Changes in function:  Yes (See Goal flowsheet attached for changes in current functional level)  Adverse reaction to treatment or activity: None    OBJECTIVE  Changes noted in objective findings: Cuing, but improvement with combined functional activity with PF recruitment     ASSESSMENT/PLAN  Diagnosis: s/p prostatectomy   Updated problem list and treatment plan:   Pain - HEP  Decreased ROM/flexibility - HEP  Decreased function - HEP  Decreased strength - HEP  Impaired muscle performance - HEP  Impaired gait - HEP  Decreased proprioception - HEP  Impaired posture - HEP  Decreased joint mobility - HEP  STG/LTGs have been met or progress has been made towards goals:  Yes, please see goal flowsheet for most current information  Assessment of Progress: current status is unknown.    Last current status:     Self Management Plans:  HEP  I have re-evaluated this patient and find that the nature, scope, duration and intensity of the therapy is appropriate for the medical condition of the patient.  Loy continues to require the following intervention to meet STG and LTG's:  HEP.    Recommendations:  Discharge with current home program.  Patient to follow up with MD as needed.    Please refer to the daily flowsheet for treatment today, total treatment time and time spent performing 1:1 timed codes.    Gilma Martinez, PT, OCS, Cert MDT  #[id number]

## 2020-05-08 DIAGNOSIS — I48.91 ATRIAL FIBRILLATION WITH RAPID VENTRICULAR RESPONSE (H): ICD-10-CM

## 2020-05-08 DIAGNOSIS — Z94.4 LIVER TRANSPLANT RECIPIENT (H): ICD-10-CM

## 2020-05-08 RX ORDER — ASPIRIN 325 MG
325 TABLET, DELAYED RELEASE (ENTERIC COATED) ORAL DAILY
Qty: 90 TABLET | Refills: 3 | Status: SHIPPED | OUTPATIENT
Start: 2020-05-08 | End: 2021-04-13

## 2020-05-08 NOTE — TELEPHONE ENCOUNTER
[facility] Call Center    Phone Message    May a detailed message be left on voicemail: yes     Reason for Call: Medication Refill Request    Has the patient contacted the pharmacy for the refill? Yes   Name of medication being requested: aspirin (ASA) 325 MG EC tablet  Provider who prescribed the medication:   Pharmacy:  [place], [place] - [address]  Date medication is needed: asap         Action Taken: Message routed to:  [facility] ([facility]): [facility] med refill team    Travel Screening: Not Applicable

## 2020-05-08 NOTE — TELEPHONE ENCOUNTER
Last Clinic Visit: 1/13/20, [place] 7/15/20  Requesting refill be sent to a different pharmacy  Call to Loy, advised ASA  Prescription sent to requested pharmacy

## 2020-05-11 RX ORDER — METOPROLOL TARTRATE 75 MG/1
75 TABLET, FILM COATED ORAL 2 TIMES DAILY
Qty: 180 TABLET | Refills: 2 | Status: SHIPPED | OUTPATIENT
Start: 2020-05-11 | End: 2021-02-09

## 2020-05-15 ENCOUNTER — APPOINTMENT (OUTPATIENT)
Dept: INTERPRETER SERVICES | Facility: CLINIC | Age: 65
End: 2020-05-15
Payer: COMMERCIAL

## 2020-06-24 ENCOUNTER — TELEPHONE (OUTPATIENT)
Dept: TRANSPLANT | Facility: CLINIC | Age: 65
End: 2020-06-24

## 2020-06-24 ENCOUNTER — VIRTUAL VISIT (OUTPATIENT)
Dept: GASTROENTEROLOGY | Facility: CLINIC | Age: 65
End: 2020-06-24
Attending: INTERNAL MEDICINE
Payer: COMMERCIAL

## 2020-06-24 DIAGNOSIS — C22.0 LIVER CELL CARCINOMA (H): ICD-10-CM

## 2020-06-24 DIAGNOSIS — C22.0 HCC (HEPATOCELLULAR CARCINOMA) (H): ICD-10-CM

## 2020-06-24 DIAGNOSIS — Z94.4 LIVER REPLACED BY TRANSPLANT (H): ICD-10-CM

## 2020-06-24 DIAGNOSIS — Z94.4 LIVER TRANSPLANTED (H): Primary | ICD-10-CM

## 2020-06-24 DIAGNOSIS — Z13.220 LIPID SCREENING: ICD-10-CM

## 2020-06-24 DIAGNOSIS — R14.0 ABDOMINAL BLOATING: ICD-10-CM

## 2020-06-24 ASSESSMENT — PAIN SCALES - GENERAL: PAINLEVEL: NO PAIN (0)

## 2020-06-24 NOTE — LETTER
"    6/24/2020         RE: Loy Limon  [address]  [place] [address]        Dear Colleague,    Thank you for referring your patient, Loy Limon, to the [facility]. Please see a copy of my visit note below.    Loy Limon is a 65 year old male who is being evaluated via a billable telephone visit.      The patient has been notified of following:     \"This telephone visit will be conducted via a call between you and your physician/provider. We have found that certain health care needs can be provided without the need for a physical exam.  This service lets us provide the care you need with a short phone conversation.  If a prescription is necessary we can send it directly to your pharmacy.  If lab work is needed we can place an order for that and you can then stop by our lab to have the test done at a later time.    Telephone visits are billed at different rates depending on your insurance coverage. During this emergency period, for some insurers they may be billed the same as an in-person visit.  Please reach out to your insurance provider with any questions.    If during the course of the call the physician/provider feels a telephone visit is not appropriate, you will not be charged for this service.\"    Patient has given verbal consent for Telephone visit?  Yes    What phone number would you like to be contacted at? [phone]    How would you like to obtain your AVS? Mail a copy    Phone call duration: 12 minutes    Tamela Carballo MD    [facility]    Hepatology follow-up    CHIEF COMPLAINT/REASON FOR THE VISIT:  Status post liver transplantation.      SUBJECTIVE :  Mr. 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  He had the diagnosis of hepatocellular carcinoma made in 03/2018.  He got evaluated " and eventually got listed.  He had the transplant on  2018.  Prior to the transplantation, he had microwave ablation.  His explant showed viable tumor He did not have imaging recently.  Mr. Limon, who was seen and followed by Urology for urinary issues, and in fact he is currently diagnosed with intermediate risk prostate cancer and had  surgery.       Symptom-wise, he has some right upper quadrant abdominal pain. This was seen on imaging and it appeared that this might have been related with his surgery. It showed the thickness which he feels showed that he had in the anterior abdominal wall muscle something they thought might be related with evolving post-surgical changes and they thought that this is not drainable. His appetite is good. He denies any nausea or vomiting. He is moving his bowels.  He dis gain weight, may be 10  Ponds.  Otherwise, he is following with Urology and is going to have surgery on 2019.  Patient denies fevers, sweats or chills.  No cough or SOB. No chest pain. Weight stable.    Medical hx Surgical hx   Past Medical History:   Diagnosis Date     Anemia      Biliary stricture of transplanted liver (H) 2018     BPH (benign prostatic hyperplasia)      Cancer (H)     hepatocellualr carcinoma     Cirrhosis of liver (H) 2018     Diabetes (H)      Enlarged prostate      Hypothyroidism      Inguinal hernia     Repaired with mesh on 18     Liver lesion 2018     Liver transplanted (H) 2018     donor liver transplant     Portal vein thrombosis     on path explant     Postoperative atrial fibrillation (H) 2018     Prostate cancer (H)      TB lung, latent     Treated       Past Surgical History:   Procedure Laterality Date     APPENDECTOMY       COLONOSCOPY      2018     DAVINCI PROSTATECTOMY N/A 1/3/2020    Procedure: Robot-assisted laparoscopic radical prostatectomy, Bladder biopsy;  Surgeon: Kenrick Casiano MD;  [facility]      "ENDOSCOPIC RETROGRADE CHOLANGIOPANCREATOGRAM N/A 2018    Procedure: ENDOSCOPIC RETROGRADE CHOLANGIOPANCREATOGRAM, with Billary Sphincterotomy and Billary Stent Placement;  Surgeon: Omero Lawler MD;  [facility]     ENDOSCOPIC RETROGRADE CHOLANGIOPANCREATOGRAM  2019    Procedure: COMBINED ENDOSCOPIC RETROGRADE CHOLANGIOPANCREATOGRAPHY, Bile Duct Stent Exchange;  Surgeon: Omero Lawler MD;  [facility]     ENDOSCOPIC RETROGRADE CHOLANGIOPANCREATOGRAM N/A 2019    Procedure: ENDOSCOPIC RETROGRADE CHOLANGIOPANCREATOGRAPHY, WITH BILIARY STENT REMOVAL AND STONE EXTRACTION;  Surgeon: Omero Lawler MD;  [facility]     HERNIORRHAPHY INGUINAL  2018    Procedure: Herniorrhaphy inguinal;  Surgeon: Emil Echevarria MD;  [facility]     IR LIVER BIOPSY PERCUTANEOUS  2018     LAPAROTOMY EXPLORATORY      per the patient \"for infection in the LLQ\"      TRANSPLANT LIVER RECIPIENT  DONOR N/A 2018    Procedure: TRANSPLANT LIVER RECIPIENT  DONOR;  Surgeon: Emil Echevarria MD;  [facility]          Medications  Prior to Admission medications    Medication Sig Start Date End Date Taking? Authorizing Provider   alfuzosin ER 10 MG PO 24 hr tablet Take 1 tablet (10 mg) by mouth daily DO NOT CRUSH. 20  Yes Jaswinder Anne MD   amLODIPine (NORVASC) 10 MG tablet Take 1 tablet (10 mg) by mouth daily 20  Yes Jaswinder Anne MD   aspirin (ASA) 325 MG EC tablet Take 1 tablet (325 mg) by mouth daily 20  Yes Jaswinder Anne MD   bisacodyl (DULCOLAX) 10 MG suppository Place 1 suppository (10 mg) rectally daily For constipation. Stop if diarrhea occurs. 20  Yes Rob Parker MD   blood glucose (NO BRAND SPECIFIED) lancets standard Use to test blood sugar 4 times daily or as directed. 20  Yes Jaswinder Anne MD   blood glucose (ONETOUCH VERIO IQ) test strip Use to test blood sugar 4 times daily or as " directed. 1/21/20  Yes Jaswinder Anne MD   glucose 40 % (400 mg/mL) gel Take 15-30 g by mouth every 15 minutes as needed for low blood sugar 1/21/20  Yes Jaswinder Anne MD   Lancets (ONETOUCH DELICA PLUS [id number]) MISC U TO TEST QID OR UTD 3/3/20  Yes Reported, Patient   levothyroxine (SYNTHROID/LEVOTHROID) 25 MCG tablet Take 1 tablet (25 mcg) by mouth daily 3/11/20  Yes Gilma Guthrie PA-C   magnesium hydroxide (MILK OF MAGNESIA) 400 MG/5ML suspension Take 30 mLs by mouth daily as needed for constipation or heartburn (Stop if diarrhea occurs) 1/4/20  Yes Rob Parker MD   metFORMIN (GLUCOPHAGE-XR) 500 MG 24 hr tablet Take 2 tablets (1,000 mg) by mouth 2 times daily (with meals) 4/16/20  Yes Jaswinder Anne MD   Metoprolol Tartrate 75 MG TABS Take 75 mg by mouth 2 times daily 5/11/20  Yes Jaswinder Anne MD   mineral oil liquid Take 30 mLs by mouth daily For constipation. Stop if diarrhea occurs. 1/5/20  Yes Rob Parker MD   polyethylene glycol (MIRALAX) packet Take 17 g by mouth daily 2/18/20  Yes Nuvia Pascual PA   polyethylene glycol (MIRALAX) powder MIX 17 GRAMS AND DRINK D 2/18/20  Yes Reported, Patient   Sharps Container MISC 1 each daily 1/7/19  Yes Inez Baker APRN CNP   tacrolimus (GENERIC EQUIVALENT) 1 MG capsule Take 3 capsules (3 mg) by mouth every 12 hours 1/22/20  Yes Tamela Carballo MD   ursodiol (ACTIGALL) 250 MG tablet Take 1 tablet (250 mg) by mouth 2 times daily 1/24/20  Yes Tamela Carballo MD   Multiple Vitamin (DAILY-STEVE) TABS TK 1 T PO D. 3/18/20   Reported, Patient       Allergies  No Known Allergies    Review of systems  A 10-point review of systems was negative    Examination  There were no vitals taken for this visit.  There is no height or weight on file to calculate BMI.    Gen- well, NAD, A+Ox3, normal color  Abd- Surgical scars.  Extr- hands normal, no OSKAR  Psych- normal mood    Laboratory  Lab Results    Component Value Date     01/05/2020    POTASSIUM 4.5 01/05/2020    CHLORIDE 110 01/05/2020    CO2 27 01/05/2020    BUN 16 01/05/2020    CR 0.71 03/11/2020       Lab Results   Component Value Date    BILITOTAL 0.8 12/18/2019    ALT 23 12/18/2019    AST 9 12/18/2019    ALKPHOS 178 12/18/2019       Lab Results   Component Value Date    ALBUMIN 3.6 12/18/2019    PROTTOTAL 7.0 12/18/2019        Lab Results   Component Value Date    WBC 5.1 01/05/2020    HGB 11.6 01/05/2020    MCV 89 01/05/2020     01/05/2020       Lab Results   Component Value Date    INR 1.14 12/31/2018       Radiology    ASSESSMENT AND PLAN:  Status post liver transplantation.  Mr. Limon has received a liver transplantation for hepatocellular carcinoma in the context of alcoholic cirrhosis.  He is doing quite well.  His labs were normal liver function tests with minimal elevation of the alkaline phosphatase at 178. Will repeat that. Need a CT scan of the chest and abdomin.   Otherwise, he is doing quite well.  He had the  prostate cancer surgery. He will follow with Urology.  He will continue otherwise his immunosuppression.  He will be seen here in 6 months and in the meantime, he will follow with his primary care physician and urologist.  He will need also to see Dermatology on a yearly basis.      Tamela Carballo MD  Hepatology  [facility]

## 2020-06-24 NOTE — TELEPHONE ENCOUNTER
Call from Dr. Carballo who had a visit w/ Mr. Limon.  He is concerned because Claribel hasn't had standard follow-up.  Per Dr. Carballo, he needs:  1.) derm appt  2.) oncology follow-up (live tumor on explant  3.) current lab orders w/ annual afp  4.) US abd complete.  Orders place, message to schedulers sent. Claribel reminded of the importance of getting regular labs and follow-up.

## 2020-06-24 NOTE — PROGRESS NOTES
"Loy Limon is a 65 year old male who is being evaluated via a billable telephone visit.      The patient has been notified of following:     \"This telephone visit will be conducted via a call between you and your physician/provider. We have found that certain health care needs can be provided without the need for a physical exam.  This service lets us provide the care you need with a short phone conversation.  If a prescription is necessary we can send it directly to your pharmacy.  If lab work is needed we can place an order for that and you can then stop by our lab to have the test done at a later time.    Telephone visits are billed at different rates depending on your insurance coverage. During this emergency period, for some insurers they may be billed the same as an in-person visit.  Please reach out to your insurance provider with any questions.    If during the course of the call the physician/provider feels a telephone visit is not appropriate, you will not be charged for this service.\"    Patient has given verbal consent for Telephone visit?  Yes    What phone number would you like to be contacted at? [phone]    How would you like to obtain your AVS? Mail a copy    Phone call duration: 12 minutes    Tamela Carballo MD    [facility]    Hepatology follow-up    CHIEF COMPLAINT/REASON FOR THE VISIT:  Status post liver transplantation.      SUBJECTIVE :  Mr. 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  He had the diagnosis of hepatocellular carcinoma made in 03/2018.  He got evaluated and eventually got listed.  He had the transplant on  12/22/2018.  Prior to the transplantation, he had microwave ablation.  His explant showed viable tumor He did not have imaging recently.  Mr. Limon, who was seen and followed by Urology for " urinary issues, and in fact he is currently diagnosed with intermediate risk prostate cancer and had  surgery.       Symptom-wise, he has some right upper quadrant abdominal pain. This was seen on imaging and it appeared that this might have been related with his surgery. It showed the thickness which he feels showed that he had in the anterior abdominal wall muscle something they thought might be related with evolving post-surgical changes and they thought that this is not drainable. His appetite is good. He denies any nausea or vomiting. He is moving his bowels.  He dis gain weight, may be 10  Ponds.  Otherwise, he is following with Urology and is going to have surgery on 2019.  Patient denies fevers, sweats or chills.  No cough or SOB. No chest pain. Weight stable.    Medical hx Surgical hx   Past Medical History:   Diagnosis Date     Anemia      Biliary stricture of transplanted liver (H) 2018     BPH (benign prostatic hyperplasia)      Cancer (H)     hepatocellualr carcinoma     Cirrhosis of liver (H) 2018     Diabetes (H)      Enlarged prostate      Hypothyroidism      Inguinal hernia     Repaired with mesh on 18     Liver lesion 2018     Liver transplanted (H) 2018     donor liver transplant     Portal vein thrombosis     on path explant     Postoperative atrial fibrillation (H) 2018     Prostate cancer (H)      TB lung, latent     Treated       Past Surgical History:   Procedure Laterality Date     APPENDECTOMY       COLONOSCOPY           DAVINCI PROSTATECTOMY N/A 1/3/2020    Procedure: Robot-assisted laparoscopic radical prostatectomy, Bladder biopsy;  Surgeon: Kenrick Casiano MD;  Location: [facility]     ENDOSCOPIC RETROGRADE CHOLANGIOPANCREATOGRAM N/A 2018    Procedure: ENDOSCOPIC RETROGRADE CHOLANGIOPANCREATOGRAM, with Billary Sphincterotomy and Billary Stent Placement;  Surgeon: Omero Lawler MD;  Location: [facility] OR     ENDOSCOPIC  "RETROGRADE CHOLANGIOPANCREATOGRAM  2019    Procedure: COMBINED ENDOSCOPIC RETROGRADE CHOLANGIOPANCREATOGRAPHY, Bile Duct Stent Exchange;  Surgeon: Omero Lawler MD;  Location: [facility] OR     ENDOSCOPIC RETROGRADE CHOLANGIOPANCREATOGRAM N/A 2019    Procedure: ENDOSCOPIC RETROGRADE CHOLANGIOPANCREATOGRAPHY, WITH BILIARY STENT REMOVAL AND STONE EXTRACTION;  Surgeon: Omero Lawler MD;  Location: [facility] OR     HERNIORRHAPHY INGUINAL  2018    Procedure: Herniorrhaphy inguinal;  Surgeon: Emil Echevarria MD;  Location: [facility] OR     IR LIVER BIOPSY PERCUTANEOUS  2018     LAPAROTOMY EXPLORATORY      per the patient \"for infection in the LLQ\"      TRANSPLANT LIVER RECIPIENT  DONOR N/A 2018    Procedure: TRANSPLANT LIVER RECIPIENT  DONOR;  Surgeon: Emil Echevarria MD;  Location: [facility] OR          Medications  Prior to Admission medications    Medication Sig Start Date End Date Taking? Authorizing Provider   alfuzosin ER 10 MG PO 24 hr tablet Take 1 tablet (10 mg) by mouth daily DO NOT CRUSH. 20  Yes Jaswinder Anne MD   amLODIPine (NORVASC) 10 MG tablet Take 1 tablet (10 mg) by mouth daily 20  Yes Jaswinder Anne MD   aspirin (ASA) 325 MG EC tablet Take 1 tablet (325 mg) by mouth daily 20  Yes Jaswinder Anne MD   bisacodyl (DULCOLAX) 10 MG suppository Place 1 suppository (10 mg) rectally daily For constipation. Stop if diarrhea occurs. 20  Yes Rob Parker MD   blood glucose (NO BRAND SPECIFIED) lancets standard Use to test blood sugar 4 times daily or as directed. 20  Yes Jaswinder Anne MD   blood glucose (ONETOUCH VERIO IQ) test strip Use to test blood sugar 4 times daily or as directed. 20  Yes Jaswinder Anne MD   glucose 40 % (400 mg/mL) gel Take 15-30 g by mouth every 15 minutes as needed for low blood sugar 20  Yes Jaswinder Anne MD   Lancets (ONETOUCH DELICA PLUS [id number]) MISC U TO TEST QID OR " UTD 3/3/20  Yes Reported, Patient   levothyroxine (SYNTHROID/LEVOTHROID) 25 MCG tablet Take 1 tablet (25 mcg) by mouth daily 3/11/20  Yes Gilma Guthrie PA-C   magnesium hydroxide (MILK OF MAGNESIA) 400 MG/5ML suspension Take 30 mLs by mouth daily as needed for constipation or heartburn (Stop if diarrhea occurs) 1/4/20  Yes Rob Parker MD   metFORMIN (GLUCOPHAGE-XR) 500 MG 24 hr tablet Take 2 tablets (1,000 mg) by mouth 2 times daily (with meals) 4/16/20  Yes Jaswinder Anne MD   Metoprolol Tartrate 75 MG TABS Take 75 mg by mouth 2 times daily 5/11/20  Yes Jaswinder Anne MD   mineral oil liquid Take 30 mLs by mouth daily For constipation. Stop if diarrhea occurs. 1/5/20  Yes Rob Parker MD   polyethylene glycol (MIRALAX) packet Take 17 g by mouth daily 2/18/20  Yes Nuvia Pascual PA   polyethylene glycol (MIRALAX) powder MIX 17 GRAMS AND DRINK D 2/18/20  Yes Reported, Patient   Sharps Container MISC 1 each daily 1/7/19  Yes Inez Baker APRN CNP   tacrolimus (GENERIC EQUIVALENT) 1 MG capsule Take 3 capsules (3 mg) by mouth every 12 hours 1/22/20  Yes Tamela Carballo MD   ursodiol (ACTIGALL) 250 MG tablet Take 1 tablet (250 mg) by mouth 2 times daily 1/24/20  Yes Tamela Carballo MD   Multiple Vitamin (DAILY-STEVE) TABS TK 1 T PO D. 3/18/20   Reported, Patient       Allergies  No Known Allergies    Review of systems  A 10-point review of systems was negative    Examination  There were no vitals taken for this visit.  There is no height or weight on file to calculate BMI.    Gen- well, NAD, A+Ox3, normal color  Abd- Surgical scars.  Extr- hands normal, no OSKAR  Psych- normal mood    Laboratory  Lab Results   Component Value Date     01/05/2020    POTASSIUM 4.5 01/05/2020    CHLORIDE 110 01/05/2020    CO2 27 01/05/2020    BUN 16 01/05/2020    CR 0.71 03/11/2020       Lab Results   Component Value Date    BILITOTAL 0.8 12/18/2019    ALT 23  12/18/2019    AST 9 12/18/2019    ALKPHOS 178 12/18/2019       Lab Results   Component Value Date    ALBUMIN 3.6 12/18/2019    PROTTOTAL 7.0 12/18/2019        Lab Results   Component Value Date    WBC 5.1 01/05/2020    HGB 11.6 01/05/2020    MCV 89 01/05/2020     01/05/2020       Lab Results   Component Value Date    INR 1.14 12/31/2018       Radiology    ASSESSMENT AND PLAN:  Status post liver transplantation.  Mr. Limon has received a liver transplantation for hepatocellular carcinoma in the context of alcoholic cirrhosis.  He is doing quite well.  His labs were normal liver function tests with minimal elevation of the alkaline phosphatase at 178. Will repeat that. Need a CT scan of the chest and abdomin.   Otherwise, he is doing quite well.  He had the  prostate cancer surgery. He will follow with Urology.  He will continue otherwise his immunosuppression.  He will be seen here in 6 months and in the meantime, he will follow with his primary care physician and urologist.  He will need also to see Dermatology on a yearly basis.      Tamela Carballo MD  Hepatology  [facility]

## 2020-06-26 ENCOUNTER — APPOINTMENT (OUTPATIENT)
Dept: INTERPRETER SERVICES | Facility: CLINIC | Age: 65
End: 2020-06-26
Payer: COMMERCIAL

## 2020-06-26 ENCOUNTER — ANCILLARY PROCEDURE (OUTPATIENT)
Dept: ULTRASOUND IMAGING | Facility: CLINIC | Age: 65
End: 2020-06-26
Attending: INTERNAL MEDICINE
Payer: COMMERCIAL

## 2020-06-26 ENCOUNTER — PRE VISIT (OUTPATIENT)
Dept: ONCOLOGY | Facility: CLINIC | Age: 65
End: 2020-06-26

## 2020-06-26 DIAGNOSIS — R14.0 ABDOMINAL BLOATING: ICD-10-CM

## 2020-06-26 DIAGNOSIS — Z94.4 LIVER TRANSPLANTED (H): ICD-10-CM

## 2020-06-26 NOTE — TELEPHONE ENCOUNTER
ONCOLOGY INTAKE: Records Information      APPT INFORMATION:  Referring provider:  Dr. Carballo  Referring provider s clinic:  [facility]  Reason for visit/diagnosis:  HCC  Has patient been notified of appointment date and time?: Yes    RECORDS INFORMATION:  Were the records received with the referral (via [employer])? yes    Has patient been seen for any external appt for this diagnosis? No    If yes, where? n/a    Has patient had any imaging or procedures outside of [place] for this condition? no      If Yes, where? N/a    ADDITIONAL INFORMATION:  none

## 2020-06-29 ENCOUNTER — TELEPHONE (OUTPATIENT)
Dept: TRANSPLANT | Facility: CLINIC | Age: 65
End: 2020-06-29

## 2020-06-29 NOTE — TELEPHONE ENCOUNTER
Call to Claribel w/ the assistance of an  to call to Claribel to remind him of the importance of getting regular lab testing.  No answer, AZALIA.

## 2020-07-01 ENCOUNTER — PRE VISIT (OUTPATIENT)
Dept: UROLOGY | Facility: CLINIC | Age: 65
End: 2020-07-01

## 2020-07-01 NOTE — TELEPHONE ENCOUNTER
Chief Complaint : Follow up - 3 Months    Hx/Sx: Prostate cancer (s/p Prostatectomy 1/3/20)    Records/Orders: PSA scheduled    Pt Contacted: N/a    At Rooming: Antonette Tavares EMT

## 2020-07-03 ENCOUNTER — APPOINTMENT (OUTPATIENT)
Dept: INTERPRETER SERVICES | Facility: CLINIC | Age: 65
End: 2020-07-03
Payer: COMMERCIAL

## 2020-07-03 ENCOUNTER — TELEPHONE (OUTPATIENT)
Dept: TRANSPLANT | Facility: CLINIC | Age: 65
End: 2020-07-03

## 2020-07-03 DIAGNOSIS — Z94.4 LIVER REPLACED BY TRANSPLANT (H): ICD-10-CM

## 2020-07-03 DIAGNOSIS — C61 PROSTATE CANCER (H): ICD-10-CM

## 2020-07-03 DIAGNOSIS — Z13.220 LIPID SCREENING: ICD-10-CM

## 2020-07-03 DIAGNOSIS — C22.0 LIVER CELL CARCINOMA (H): ICD-10-CM

## 2020-07-03 DIAGNOSIS — E03.8 OTHER SPECIFIED HYPOTHYROIDISM: ICD-10-CM

## 2020-07-03 LAB
AFP SERPL-MCNC: <1.5 UG/L (ref 0–8)
ALBUMIN SERPL-MCNC: 3.4 G/DL (ref 3.4–5)
ALP SERPL-CCNC: 186 U/L (ref 40–150)
ALT SERPL W P-5'-P-CCNC: 19 U/L (ref 0–70)
ANION GAP SERPL CALCULATED.3IONS-SCNC: 6 MMOL/L (ref 3–14)
AST SERPL W P-5'-P-CCNC: 10 U/L (ref 0–45)
BILIRUB DIRECT SERPL-MCNC: 0.2 MG/DL (ref 0–0.2)
BILIRUB SERPL-MCNC: 0.8 MG/DL (ref 0.2–1.3)
BUN SERPL-MCNC: 19 MG/DL (ref 7–30)
CALCIUM SERPL-MCNC: 8 MG/DL (ref 8.5–10.1)
CHLORIDE SERPL-SCNC: 106 MMOL/L (ref 94–109)
CO2 SERPL-SCNC: 26 MMOL/L (ref 20–32)
CREAT SERPL-MCNC: 0.89 MG/DL (ref 0.66–1.25)
ERYTHROCYTE [DISTWIDTH] IN BLOOD BY AUTOMATED COUNT: 12.1 % (ref 10–15)
GFR SERPL CREATININE-BSD FRML MDRD: 88 ML/MIN/{1.73_M2}
GLUCOSE SERPL-MCNC: 494 MG/DL (ref 70–99)
HCT VFR BLD AUTO: 40 % (ref 40–53)
HGB BLD-MCNC: 13.6 G/DL (ref 13.3–17.7)
MAGNESIUM SERPL-MCNC: 2.2 MG/DL (ref 1.6–2.3)
MCH RBC QN AUTO: 28.8 PG (ref 26.5–33)
MCHC RBC AUTO-ENTMCNC: 34 G/DL (ref 31.5–36.5)
MCV RBC AUTO: 85 FL (ref 78–100)
PLATELET # BLD AUTO: 185 10E9/L (ref 150–450)
POTASSIUM SERPL-SCNC: 5.2 MMOL/L (ref 3.4–5.3)
PROT SERPL-MCNC: 6.7 G/DL (ref 6.8–8.8)
PSA SERPL-MCNC: <0.01 UG/L (ref 0–4)
RBC # BLD AUTO: 4.72 10E12/L (ref 4.4–5.9)
SODIUM SERPL-SCNC: 137 MMOL/L (ref 133–144)
T4 FREE SERPL-MCNC: 1.09 NG/DL (ref 0.76–1.46)
TSH SERPL DL<=0.005 MIU/L-ACNC: 7.88 MU/L (ref 0.4–4)
WBC # BLD AUTO: 4.3 10E9/L (ref 4–11)

## 2020-07-03 NOTE — TELEPHONE ENCOUNTER
Pt's blood sugar elevated 496.   Spoke with  and Patient did eat at 10 am. He says his blood sugars have been running higher in the mornings. He is taking metformin. Message to BASHIR Shepard for endocrine.    Patient also reports that he has gained 7 pounds in the last 4 months. He is tired of his belly being distended. He feels it is going to burst. Patient did have US 6/26 shows no ascites. Patient wants his belly distention fixed. Message to dr chacon.

## 2020-07-06 NOTE — TELEPHONE ENCOUNTER
Left a message for pt through  [facility] stating that Dr. Carballo recommend taking gas x for the abdominal distention and to call with questions.

## 2020-07-07 ENCOUNTER — APPOINTMENT (OUTPATIENT)
Dept: INTERPRETER SERVICES | Facility: CLINIC | Age: 65
End: 2020-07-07
Payer: COMMERCIAL

## 2020-07-07 ENCOUNTER — OFFICE VISIT (OUTPATIENT)
Dept: UROLOGY | Facility: CLINIC | Age: 65
End: 2020-07-07
Payer: COMMERCIAL

## 2020-07-07 VITALS — DIASTOLIC BLOOD PRESSURE: 83 MMHG | HEART RATE: 85 BPM | SYSTOLIC BLOOD PRESSURE: 145 MMHG

## 2020-07-07 DIAGNOSIS — N52.31 ERECTILE DYSFUNCTION AFTER RADICAL PROSTATECTOMY: ICD-10-CM

## 2020-07-07 DIAGNOSIS — C61 PROSTATE CANCER (H): ICD-10-CM

## 2020-07-07 DIAGNOSIS — R35.1 NOCTURIA: Primary | ICD-10-CM

## 2020-07-07 LAB
ALBUMIN UR-MCNC: 100 MG/DL
APPEARANCE UR: CLEAR
BILIRUB UR QL STRIP: NEGATIVE
COLOR UR AUTO: YELLOW
GLUCOSE UR STRIP-MCNC: >499 MG/DL
HGB UR QL STRIP: ABNORMAL
KETONES UR STRIP-MCNC: NEGATIVE MG/DL
LEUKOCYTE ESTERASE UR QL STRIP: NEGATIVE
NITRATE UR QL: NEGATIVE
PH UR STRIP: 5 PH (ref 5–7)
RBC #/AREA URNS AUTO: 2 /HPF (ref 0–2)
SOURCE: ABNORMAL
SP GR UR STRIP: 1.02 (ref 1–1.03)
UROBILINOGEN UR STRIP-MCNC: 0 MG/DL (ref 0–2)
WBC #/AREA URNS AUTO: <1 /HPF (ref 0–5)

## 2020-07-07 RX ORDER — SILDENAFIL CITRATE 20 MG/1
60-100 TABLET ORAL DAILY PRN
Qty: 60 TABLET | Refills: 3 | Status: SHIPPED | OUTPATIENT
Start: 2020-07-07 | End: 2021-06-18

## 2020-07-07 ASSESSMENT — PAIN SCALES - GENERAL: PAINLEVEL: NO PAIN (0)

## 2020-07-07 NOTE — PATIENT INSTRUCTIONS
- Urinalysis and will check today to make sure you are emptying your bladder.   - Continue doing home pelvic floor exercises (to help urine control)  - Discontinue alfuzosin (this is a prostate medication)  - Sildenafil 60-100mg (3-5 tablets ) about 1/2 hour prior to sexual activity  - Return in 3 months with a PSA at the lab first, right before leaving for [place].  Then return again after returning from [place].     [facility]

## 2020-07-07 NOTE — PROGRESS NOTES
"HPI: Mr. Loy Limon is a 65 year old year old male presenting today for evaluation of chief complaint(s): Follow Up (Prostate cancer)    Mr. Limon has PMH significant for HTN, DM, and immunosuppression 2/2 liver transplant.  More recently he also developed prostate cancer (4/26/19 - PSA 5.51ng/dL) and is now s/p RALP with Dr. Casiano on 1/3/20, final path: Rebecca 3+4=7, neg margins, [id number].      Decipher: HIGH RISK    Professional .  He was last seen by Dr. Casiano 4/6/20 for a telephone visit.  At that time his PSA was undetectable and his urine control was improving although he was still having small leaks.     Today he returns in routine followup, 6 months out from RALP with an undetectable PSA (7/3/20).      VOIDING SYMPTOMS:   Needs to void every hour, day and night.  Also having urgency.  If he cannot get to the toilet quickly will have an accident.  Also will leak with cough/ sneeze/ activity.  Using briefs (1 per day)- puts paper inside the diaper, and changes the paper frequently. Stream is normal. Sometimes with voiding, he feels he is not emptying his bladder fully. No dysuria, hematuria. Did see PFPT and was taught exercises and is doing these at home. Drinks \"a lot\"of water (6-7 eight ounce bottles per day).  Caffeine: none  AUA SS today: **    SEXUAL SXS:  No erections.  Can have a little orgasm.  He is very interested in restoring sexual fxn.  Hasn't tried PDE5Is.  Has been told he has a minor heart murmor that didn't require treatment.   Before surgery, was sexually active.      Plans to go to [place] in October 2020 and will try to stay 3-4 months.     Current Outpatient Medications   Medication Sig Dispense Refill     alfuzosin ER 10 MG PO 24 hr tablet Take 1 tablet (10 mg) by mouth daily DO NOT CRUSH. 90 tablet 3     amLODIPine (NORVASC) 10 MG tablet Take 1 tablet (10 mg) by mouth daily 90 tablet 3     aspirin (ASA) 325 MG EC tablet Take 1 tablet (325 mg) by mouth daily " 90 tablet 3     bisacodyl (DULCOLAX) 10 MG suppository Place 1 suppository (10 mg) rectally daily For constipation. Stop if diarrhea occurs. 10 suppository 1     blood glucose (NO BRAND SPECIFIED) lancets standard Use to test blood sugar 4 times daily or as directed. 200 each 11     blood glucose (ONETOUCH VERIO IQ) test strip Use to test blood sugar 4 times daily or as directed. 200 strip 11     glucose 40 % (400 mg/mL) gel Take 15-30 g by mouth every 15 minutes as needed for low blood sugar 30 g 3     Lancets (ONETOUCH DELICA PLUS [id number]) MISC U TO TEST QID OR UTD       levothyroxine (SYNTHROID/LEVOTHROID) 25 MCG tablet Take 1 tablet (25 mcg) by mouth daily 90 tablet 3     magnesium hydroxide (MILK OF MAGNESIA) 400 MG/5ML suspension Take 30 mLs by mouth daily as needed for constipation or heartburn (Stop if diarrhea occurs) 355 mL 1     metFORMIN (GLUCOPHAGE-XR) 500 MG 24 hr tablet Take 2 tablets (1,000 mg) by mouth 2 times daily (with meals) 360 tablet 2     Metoprolol Tartrate 75 MG TABS Take 75 mg by mouth 2 times daily 180 tablet 2     mineral oil liquid Take 30 mLs by mouth daily For constipation. Stop if diarrhea occurs. 500 mL 1     Multiple Vitamin (DAILY-STEVE) TABS TK 1 T PO D.       polyethylene glycol (MIRALAX) packet Take 17 g by mouth daily 30 packet 3     polyethylene glycol (MIRALAX) powder MIX 17 GRAMS AND DRINK D       Sharps Container MISC 1 each daily 1 each 2     tacrolimus (GENERIC EQUIVALENT) 1 MG capsule Take 3 capsules (3 mg) by mouth every 12 hours 180 capsule 11     ursodiol (ACTIGALL) 250 MG tablet Take 1 tablet (250 mg) by mouth 2 times daily 180 tablet 3       ALLERGIES: Patient has no known allergies.      REVIEW OF SYSTEMS:  As above in HPI    GENERAL PHYSICAL EXAM:   Vitals: BP (!) 145/83 (BP Location: Right arm, Patient Position: Sitting)   Pulse 85   There is no height or weight on file to calculate BMI.    GENERAL: Well groomed, well developed, well nourished male in  NAD.  GI: Soft, NT, ND, no palpable masses. RUQ oblique incision well healed with underlying mass, suspected 2/2 liver transplant.  Lap incisions healed normally with   MS: Full ROM in extremities, gait normal, normal muscle tone  SKIN: Warm to touch, dry.  No visible rashes or lesions on examined areas.  HEMATOLOGIC/LYMPHATIC/IMMUNOLOGIC: No LE edema.  NEURO: Alert and oriented x 3.  PSYCH: Normal mood and affect, pleasant and agreeable during interview and exam.    PVR: Residual urine by ultrasound was 25 ml.      RADIOLOGY: The following tests were reviewed: None recent relevant    LABS: The last test results for Mr. Loy Limon were reviewed:  PSA -   Lab Results   Component Value Date    PSA <0.01 07/03/2020    PSA <0.01 03/30/2020    PSA 5.51 04/26/2019    PSA 5.02 03/19/2019    PSA 5.25 03/04/2019    PSA 3.27 08/15/2018    PSA 4.32 07/19/2018     BMP -   Recent Labs   Lab Test 07/03/20  1109 03/11/20  0911 01/05/20  0520 01/04/20  0528  12/18/19  0753  11/18/19  0810 11/06/19  0711     --  142 136   < > 140   < > 139 138   POTASSIUM 5.2  --  4.5 4.9   < > 4.5   < > 4.9 4.8   CHLORIDE 106  --  110* 105   < > 110*   < > 109 108   CO2 26  --  27 23   < > 27   < > 26 26   BUN 19  --  16 23   < > 25   < > 17 21   CR 0.89 0.71 0.67 0.74   < > 0.77   < > 0.72 0.87   *  --  149* 206*   < > 166*   < > 153* 162*   YELENA 8.0*  --  7.8* 7.4*   < > 8.4*   < > 8.4* 8.8   MAG 2.2  --   --   --   --  2.0  --  2.0 1.8   PHOS  --   --   --   --   --  2.7  --  2.4* 3.0    < > = values in this interval not displayed.       CBC -   Recent Labs   Lab Test 07/03/20  1109 01/05/20  0520 01/04/20  0528   WBC 4.3 5.1 7.4   HGB 13.6 11.6* 11.0*    164 177       ASSESSMENT:   1) prostate cancer  2) urinary frequency  3) urinary incontinence  4) ED    PLAN:   - Urinalysis and PVR (25cc) today  - Continue doing home pelvic floor exercises (to help urine control)  - Discontinue alfuzosin (this is a prostate  medication)  - Sildenafil 60-100mg (3-5 tablets ) about 1/2 hour prior to sexual activity.  Reviewed use and all side effects with the  and patient.   - Return in 3 months with a PSA at the lab first, right before leaving for [place].  Then return again after returning from [place].     Kerri Pascual PA-C  Department of Urologic Surgery

## 2020-07-07 NOTE — NURSING NOTE
Chief Complaint   Patient presents with     Follow Up     Prostate cancer       Blood pressure (!) 145/83, pulse 85. There is no height or weight on file to calculate BMI.    Patient Active Problem List   Diagnosis     Cirrhosis of liver (H)     Liver lesion     HCC (hepatocellular carcinoma) (H)     Liver transplant recipient (H)     Immunosuppressed status (H)     Hypervolemia     Atrial fibrillation with rapid ventricular response (H)     Hematuria     Bilateral wheezing     Hypoxia     Hyperglycemia     Pre-diabetes     Essential hypertension     Constipation     Biliary stricture of transplanted liver (H)     Drug-induced diabetes mellitus (H)     Muscle tension dysphonia     Laennec's cirrhosis (H)     Elevated ferritin     Inguinal hernia of right side with obstruction and without gangrene     Portal hypertension (H)     Right sided abdominal pain     Tobacco use disorder     Prostate cancer (H)       No Known Allergies    Current Outpatient Medications   Medication Sig Dispense Refill     alfuzosin ER 10 MG PO 24 hr tablet Take 1 tablet (10 mg) by mouth daily DO NOT CRUSH. 90 tablet 3     amLODIPine (NORVASC) 10 MG tablet Take 1 tablet (10 mg) by mouth daily 90 tablet 3     aspirin (ASA) 325 MG EC tablet Take 1 tablet (325 mg) by mouth daily 90 tablet 3     bisacodyl (DULCOLAX) 10 MG suppository Place 1 suppository (10 mg) rectally daily For constipation. Stop if diarrhea occurs. 10 suppository 1     blood glucose (NO BRAND SPECIFIED) lancets standard Use to test blood sugar 4 times daily or as directed. 200 each 11     blood glucose (ONETOUCH VERIO IQ) test strip Use to test blood sugar 4 times daily or as directed. 200 strip 11     glucose 40 % (400 mg/mL) gel Take 15-30 g by mouth every 15 minutes as needed for low blood sugar 30 g 3     Lancets (ONETOUCH DELICA PLUS [id number]) MISC U TO TEST QID OR UTD       levothyroxine (SYNTHROID/LEVOTHROID) 25 MCG tablet Take 1 tablet (25 mcg) by mouth daily 90  tablet 3     magnesium hydroxide (MILK OF MAGNESIA) 400 MG/5ML suspension Take 30 mLs by mouth daily as needed for constipation or heartburn (Stop if diarrhea occurs) 355 mL 1     metFORMIN (GLUCOPHAGE-XR) 500 MG 24 hr tablet Take 2 tablets (1,000 mg) by mouth 2 times daily (with meals) 360 tablet 2     Metoprolol Tartrate 75 MG TABS Take 75 mg by mouth 2 times daily 180 tablet 2     mineral oil liquid Take 30 mLs by mouth daily For constipation. Stop if diarrhea occurs. 500 mL 1     Multiple Vitamin (DAILY-STEVE) TABS TK 1 T PO D.       polyethylene glycol (MIRALAX) packet Take 17 g by mouth daily 30 packet 3     polyethylene glycol (MIRALAX) powder MIX 17 GRAMS AND DRINK D       Sharps Container MISC 1 each daily 1 each 2     tacrolimus (GENERIC EQUIVALENT) 1 MG capsule Take 3 capsules (3 mg) by mouth every 12 hours 180 capsule 11     ursodiol (ACTIGALL) 250 MG tablet Take 1 tablet (250 mg) by mouth 2 times daily 180 tablet 3       Social History     Tobacco Use     Smoking status: Former Smoker     Packs/day: 0.03     Years: 20.00     Pack years: 0.60     Types: Cigarettes, Cigars     Start date: 1970     Last attempt to quit: 3/14/2018     Years since quittin.3     Smokeless tobacco: Never Used     Tobacco comment: Quit smoking 2018, one cigarette after dinner   Substance Use Topics     Alcohol use: No     Frequency: Never     Comment: Quit drinking 2018     Drug use: No       MINERVA Fagan  2020  9:29 AM

## 2020-07-07 NOTE — Clinical Note
"7/7/2020       RE: Loy Limon  [address]  [facility] [address]     Dear Colleague,    Thank you for referring your patient, Loy Limon, to the [facility] UROLOGY AND INST FOR PROSTATE AND UROLOGIC CANCERS at [facility]. Please see a copy of my visit note below.    HPI: Mr. Loy Limon is a 65 year old year old male presenting today for evaluation of chief complaint(s): Follow Up (Prostate cancer)    Mr. Limon has PMH significant for HTN, DM, and immunosuppression 2/2 liver transplant.  More recently he also developed prostate cancer (4/26/19 - PSA 5.51ng/dL) and is now s/p RALP with Dr. Casiano on 1/3/20, final path: Rebecca 3+4=7, neg margins, [id number].      Decipher: HIGH RISK    Professional .  He was last seen by Dr. Casiano 4/6/20 for a telephone visit.  At that time his PSA was undetectable and his urine control was improving although he was still having small leaks.     Today he returns in routine followup, 6 months out from RALP with an undetectable PSA (7/3/20).      VOIDING SYMPTOMS:   Needs to void every hour, day and night.  Also having urgency.  If he cannot get to the toilet quickly will have an accident.  Also will leak with cough/ sneeze/ activity.  Using briefs (1 per day)- puts paper inside the diaper, and changes the paper frequently. Stream is normal. Sometimes with voiding, he feels he is not emptying his bladder fully. No dysuria, hematuria. Did see PFPT and was taught exercises and is doing these at home. Drinks \"a lot\"of water (6-7 eight ounce bottles per day).  Caffeine: none  AUA SS today: **    SEXUAL SXS:  No erections.  Can have a little orgasm.  He is very interested in restoring sexual fxn.  Hasn't tried PDE5Is.  Has been told he has a minor heart murmor that didn't require treatment.   Before surgery, was sexually active.      Plans to go to [place] in October 2020 and will try to stay 3-4 months. "     Current Outpatient Medications   Medication Sig Dispense Refill     alfuzosin ER 10 MG PO 24 hr tablet Take 1 tablet (10 mg) by mouth daily DO NOT CRUSH. 90 tablet 3     amLODIPine (NORVASC) 10 MG tablet Take 1 tablet (10 mg) by mouth daily 90 tablet 3     aspirin (ASA) 325 MG EC tablet Take 1 tablet (325 mg) by mouth daily 90 tablet 3     bisacodyl (DULCOLAX) 10 MG suppository Place 1 suppository (10 mg) rectally daily For constipation. Stop if diarrhea occurs. 10 suppository 1     blood glucose (NO BRAND SPECIFIED) lancets standard Use to test blood sugar 4 times daily or as directed. 200 each 11     blood glucose (ONETOUCH VERIO IQ) test strip Use to test blood sugar 4 times daily or as directed. 200 strip 11     glucose 40 % (400 mg/mL) gel Take 15-30 g by mouth every 15 minutes as needed for low blood sugar 30 g 3     Lancets (ONETOUCH DELICA PLUS [id number]) MISC U TO TEST QID OR UTD       levothyroxine (SYNTHROID/LEVOTHROID) 25 MCG tablet Take 1 tablet (25 mcg) by mouth daily 90 tablet 3     magnesium hydroxide (MILK OF MAGNESIA) 400 MG/5ML suspension Take 30 mLs by mouth daily as needed for constipation or heartburn (Stop if diarrhea occurs) 355 mL 1     metFORMIN (GLUCOPHAGE-XR) 500 MG 24 hr tablet Take 2 tablets (1,000 mg) by mouth 2 times daily (with meals) 360 tablet 2     Metoprolol Tartrate 75 MG TABS Take 75 mg by mouth 2 times daily 180 tablet 2     mineral oil liquid Take 30 mLs by mouth daily For constipation. Stop if diarrhea occurs. 500 mL 1     Multiple Vitamin (DAILY-STEVE) TABS TK 1 T PO D.       polyethylene glycol (MIRALAX) packet Take 17 g by mouth daily 30 packet 3     polyethylene glycol (MIRALAX) powder MIX 17 GRAMS AND DRINK D       Sharps Container MISC 1 each daily 1 each 2     tacrolimus (GENERIC EQUIVALENT) 1 MG capsule Take 3 capsules (3 mg) by mouth every 12 hours 180 capsule 11     ursodiol (ACTIGALL) 250 MG tablet Take 1 tablet (250 mg) by mouth 2 times daily 180 tablet 3        ALLERGIES: Patient has no known allergies.      REVIEW OF SYSTEMS:  As above in HPI    GENERAL PHYSICAL EXAM:   Vitals: BP (!) 145/83 (BP Location: Right arm, Patient Position: Sitting)   Pulse 85   There is no height or weight on file to calculate BMI.    GENERAL: Well groomed, well developed, well nourished male in NAD.  GI: Soft, NT, ND, no palpable masses. RUQ oblique incision well healed with underlying mass, suspected 2/2 liver transplant.  Lap incisions healed normally with   MS: Full ROM in extremities, gait normal, normal muscle tone  SKIN: Warm to touch, dry.  No visible rashes or lesions on examined areas.  HEMATOLOGIC/LYMPHATIC/IMMUNOLOGIC: No LE edema.  NEURO: Alert and oriented x 3.  PSYCH: Normal mood and affect, pleasant and agreeable during interview and exam.    PVR: Residual urine by ultrasound was 25 ml.      RADIOLOGY: The following tests were reviewed: None recent relevant    LABS: The last test results for Mr. Loy Limon were reviewed:  PSA -   Lab Results   Component Value Date    PSA <0.01 07/03/2020    PSA <0.01 03/30/2020    PSA 5.51 04/26/2019    PSA 5.02 03/19/2019    PSA 5.25 03/04/2019    PSA 3.27 08/15/2018    PSA 4.32 07/19/2018     BMP -   Recent Labs   Lab Test 07/03/20  1109 03/11/20  0911 01/05/20  0520 01/04/20  0528  12/18/19  0753  11/18/19  0810 11/06/19  0711     --  142 136   < > 140   < > 139 138   POTASSIUM 5.2  --  4.5 4.9   < > 4.5   < > 4.9 4.8   CHLORIDE 106  --  110* 105   < > 110*   < > 109 108   CO2 26  --  27 23   < > 27   < > 26 26   BUN 19  --  16 23   < > 25   < > 17 21   CR 0.89 0.71 0.67 0.74   < > 0.77   < > 0.72 0.87   *  --  149* 206*   < > 166*   < > 153* 162*   YELENA 8.0*  --  7.8* 7.4*   < > 8.4*   < > 8.4* 8.8   MAG 2.2  --   --   --   --  2.0  --  2.0 1.8   PHOS  --   --   --   --   --  2.7  --  2.4* 3.0    < > = values in this interval not displayed.       CBC -   Recent Labs   Lab Test 07/03/20  1109 01/05/20  2235  01/04/20  0528   WBC 4.3 5.1 7.4   HGB 13.6 11.6* 11.0*    164 177       ASSESSMENT:   1) prostate cancer  2) urinary frequency  3) urinary incontinence  4) ED    PLAN:   - Urinalysis and PVR (25cc) today  - Continue doing home pelvic floor exercises (to help urine control)  - Discontinue alfuzosin (this is a prostate medication)  - Sildenafil 60-100mg (3-5 tablets ) about 1/2 hour prior to sexual activity.  Reviewed use and all side effects with the  and patient.   - Return in 3 months with a PSA at the lab first, right before leaving for [place].  Then return again after returning from [place].     BASHIR Dubon-C  Department of Urologic Surgery      Again, thank you for allowing me to participate in the care of your patient.      Sincerely,    BASHIR Madera

## 2020-07-09 ENCOUNTER — VIRTUAL VISIT (OUTPATIENT)
Dept: ONCOLOGY | Facility: CLINIC | Age: 65
End: 2020-07-09
Attending: INTERNAL MEDICINE
Payer: COMMERCIAL

## 2020-07-09 DIAGNOSIS — C22.0 HCC (HEPATOCELLULAR CARCINOMA) (H): ICD-10-CM

## 2020-07-09 PROCEDURE — 40001009 ZZH VIDEO/TELEPHONE VISIT; NO CHARGE

## 2020-07-09 PROCEDURE — 99214 OFFICE O/P EST MOD 30 MIN: CPT | Mod: 95 | Performed by: INTERNAL MEDICINE

## 2020-07-09 ASSESSMENT — PAIN SCALES - GENERAL: PAINLEVEL: NO PAIN (0)

## 2020-07-09 NOTE — LETTER
7/9/2020         RE: Loy Limon  [address]  [facility] [address]        Dear Colleague,    Thank you for referring your patient, Loy Limon, to the [facility]. Please see a copy of my visit note below.    Loy Limon is a 65 year old male who is being evaluated via a billable telephone visit.    Phone call duration: 26 minutes    Hi Melgar MD      Oncology follow up visit:  Date on this visit: 7/9/2020     HCC    Primary Physician: [facility], Oncology     History Of Present Illness:      Please see previous note for details. I have copied and updated from prior note.      Mr. Limon is a 65 year old male who has been a chronic heavy alcohol drinker presented with right abdominal pain in March 2018 and workup including a CT scan showed cirrhosis, portal hypertension and 2 hepatic lesions in the right hepatic lobe which were concerning for hepatocellular carcinoma.  He had MRI on 3/16/2018 which confirmed cirrhosis and portal hypertension and splenomegaly. 3.7 cm segment 8 lesion was OPTN 5B.  There was an antecedent 0.9 cm satellite nodule in hepatic segment 8 consistent with LI-RADS 4.  In segment 7, 1.5 cm LIRADS 4 lesion was seen  Another 1.9 cm segment 7 lesion was seen which was indeterminate.  Several other LIRADS 3 lesions were scattered.  Alpha-fetoprotein was not elevated.  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 Hepatitis B surface antibody was reactive consistent with effective vaccination. Hepatitis C antibody was nonreactive.  He has had no ascites. He has had mild hepatic and Neuropathy but was unable to tolerate lactulose because of abdominal discomfort. He has no history of variceal bleeding although he has grade 2 esophageal varices which have not been banded and he continues to be on propranolol.    He had repeat MRI done on 5/24/2018 which showed stable to slight  "increase in size of the segment 8 lesion.  Segment 7 lesion was consistent with LIRADS 4 lesion  Another segment 7 lesion is also consistent with LIRADS 4 lesion  CT of the chest showed a 4 mm solitary pulmonary nodule in the right lower lobe which remains indeterminate. There was no other evidence of metastasis.  Bone scan was negative for any evidence of metastatic disease.    He had chemoembolization of the S8 lesion on 6/13/18.    As there was residual tumor left, he had microwave ablation of residual mass in hepatic segment 8 on 7/2/2018.     S7 IRADS 4 lesions were not treated    Repeat MRI on 10/3/18 shows no residual viable tumor in S8 lesion  S7 LIRADS 4 lesion was less well defined.  He has some scattered LIRADS 3 lesions.    He had liver transplant in Dec 2018 and explanted liver showed S8 viable tumor with 90% necrosis.  S7 1.1 cm HCC and S5 1.4 cm HCC.  3 benign lymph nodes.  It was a ypT2N0 lesion.    He developed prostate cancer (4/26/19 - PSA 5.51ng/dL) and is now s/p RALP with Dr. Casiano on 1/3/20, final path: [place]ushing 3+4=7, neg margins, [id number].   Last PSA on 7/3/2020- <0.01.    Interval hx:  He last saw me in Oct 2018 before the transplant.  This is a telephone visit and professional Wallisian Interpretor is available throughout the visit.  He mentions about feeling abdominal distention since the transplant which is slightly better. No  nausea, vomiting, diarrhea or constipation. No bleeding. No recent infections. He describes mild pain in the \"stomach\" and this is going on since transplant. Dr Carballo mentions that it is likely related to his surgery.  No other swellings. No dyspnea. Energy is fair.     ECOG 1    ROS:  A comprehensive ROS was otherwise neg      I reviewed other history in epic as below.    Past Medical/Surgical History:  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/28/2018     BPH (benign prostatic hyperplasia)      Cancer " "(H)     hepatocellualr carcinoma     Cirrhosis of liver (H) 2018     Diabetes (H)      Enlarged prostate      Hypothyroidism      Inguinal hernia     Repaired with mesh on 18     Liver lesion 2018     Liver transplanted (H) 2018     donor liver transplant     Portal vein thrombosis     on path explant     Postoperative atrial fibrillation (H) 2018     Prostate cancer (H)      TB lung, latent     Treated      Past Surgical History:   Procedure Laterality Date     APPENDECTOMY       COLONOSCOPY           DAVINCI PROSTATECTOMY N/A 1/3/2020    Procedure: Robot-assisted laparoscopic radical prostatectomy, Bladder biopsy;  Surgeon: Kenrick Casiano MD;  Location: [facility]     ENDOSCOPIC RETROGRADE CHOLANGIOPANCREATOGRAM N/A 2018    Procedure: ENDOSCOPIC RETROGRADE CHOLANGIOPANCREATOGRAM, with Billary Sphincterotomy and Billary Stent Placement;  Surgeon: Omero Lawler MD;  Location: [facility] OR     ENDOSCOPIC RETROGRADE CHOLANGIOPANCREATOGRAM  2019    Procedure: COMBINED ENDOSCOPIC RETROGRADE CHOLANGIOPANCREATOGRAPHY, Bile Duct Stent Exchange;  Surgeon: Omero Lawler MD;  Location: [facility] OR     ENDOSCOPIC RETROGRADE CHOLANGIOPANCREATOGRAM N/A 2019    Procedure: ENDOSCOPIC RETROGRADE CHOLANGIOPANCREATOGRAPHY, WITH BILIARY STENT REMOVAL AND STONE EXTRACTION;  Surgeon: Omero Lawler MD;  Location: [facility] OR     HERNIORRHAPHY INGUINAL  2018    Procedure: Herniorrhaphy inguinal;  Surgeon: Emil Echevarria MD;  Location: [facility] OR     IR LIVER BIOPSY PERCUTANEOUS  2018     LAPAROTOMY EXPLORATORY      per the patient \"for infection in the LLQ\"      TRANSPLANT LIVER RECIPIENT  DONOR N/A 2018    Procedure: TRANSPLANT LIVER RECIPIENT  DONOR;  Surgeon: Emil Echevarria MD;  Location: [facility] OR     Cancer History:   As above    Allergies:  Allergies as of 2020     (No Known Allergies)     Current Medications:  Current " Outpatient Medications   Medication Sig Dispense Refill     alfuzosin ER 10 MG PO 24 hr tablet Take 1 tablet (10 mg) by mouth daily DO NOT CRUSH. 90 tablet 3     amLODIPine (NORVASC) 10 MG tablet Take 1 tablet (10 mg) by mouth daily 90 tablet 3     aspirin (ASA) 325 MG EC tablet Take 1 tablet (325 mg) by mouth daily 90 tablet 3     bisacodyl (DULCOLAX) 10 MG suppository Place 1 suppository (10 mg) rectally daily For constipation. Stop if diarrhea occurs. 10 suppository 1     blood glucose (NO BRAND SPECIFIED) lancets standard Use to test blood sugar 4 times daily or as directed. 200 each 11     blood glucose (ONETOUCH VERIO IQ) test strip Use to test blood sugar 4 times daily or as directed. 200 strip 11     glucose 40 % (400 mg/mL) gel Take 15-30 g by mouth every 15 minutes as needed for low blood sugar 30 g 3     Lancets (ONETOUCH DELICA PLUS [id number]) MISC U TO TEST QID OR UTD       levothyroxine (SYNTHROID/LEVOTHROID) 25 MCG tablet Take 1 tablet (25 mcg) by mouth daily 90 tablet 3     magnesium hydroxide (MILK OF MAGNESIA) 400 MG/5ML suspension Take 30 mLs by mouth daily as needed for constipation or heartburn (Stop if diarrhea occurs) 355 mL 1     metFORMIN (GLUCOPHAGE-XR) 500 MG 24 hr tablet Take 2 tablets (1,000 mg) by mouth 2 times daily (with meals) 360 tablet 2     Metoprolol Tartrate 75 MG TABS Take 75 mg by mouth 2 times daily 180 tablet 2     mineral oil liquid Take 30 mLs by mouth daily For constipation. Stop if diarrhea occurs. 500 mL 1     Multiple Vitamin (DAILY-STEVE) TABS TK 1 T PO D.       polyethylene glycol (MIRALAX) packet Take 17 g by mouth daily 30 packet 3     polyethylene glycol (MIRALAX) powder MIX 17 GRAMS AND DRINK D       Sharps Container MISC 1 each daily 1 each 2     sildenafil (REVATIO) 20 MG tablet Take 3-5 tablets ( mg) by mouth daily as needed (1/2 hour prior to sexual activity) 60 tablet 3     tacrolimus (GENERIC EQUIVALENT) 1 MG capsule Take 3 capsules (3 mg) by mouth  every 12 hours 180 capsule 11     ursodiol (ACTIGALL) 250 MG tablet Take 1 tablet (250 mg) by mouth 2 times daily 180 tablet 3      Family History:  Family History   Problem Relation Age of Onset     Memory loss Mother      Liver Disease Brother      Skin Cancer No family hx of      Melanoma No family hx of       Maternal grand mother had Uterus cancer at 63  He has 3 living children. One son with schizophrenia  One daughter dies of leukemia at 8 years of age  Son  shortly after birth. He had some brain congenital anomaly    Social History:  Social History     Socioeconomic History     Marital status:      Spouse name: Not on file     Number of children: Not on file     Years of education: Not on file     Highest education level: Not on file   Occupational History     Not on file   Social Needs     Financial resource strain: Not on file     Food insecurity     Worry: Not on file     Inability: Not on file     Transportation needs     Medical: Not on file     Non-medical: Not on file   Tobacco Use     Smoking status: Former Smoker     Packs/day: 0.03     Years: 20.00     Pack years: 0.60     Types: Cigarettes, Cigars     Start date: 1970     Last attempt to quit: 3/14/2018     Years since quittin.3     Smokeless tobacco: Never Used     Tobacco comment: Quit smoking 2018, one cigarette after dinner   Substance and Sexual Activity     Alcohol use: No     Frequency: Never     Comment: Quit drinking 2018     Drug use: No     Sexual activity: Not on file   Lifestyle     Physical activity     Days per week: Not on file     Minutes per session: Not on file     Stress: Not on file   Relationships     Social connections     Talks on phone: Not on file     Gets together: Not on file     Attends Latter-day service: Not on file     Active member of club or organization: Not on file     Attends meetings of clubs or organizations: Not on file     Relationship status: Not on file     Intimate partner  violence     Fear of current or ex partner: Not on file     Emotionally abused: Not on file     Physically abused: Not on file     Forced sexual activity: Not on file   Other Topics Concern     Parent/sibling w/ CABG, MI or angioplasty before 65F 55M? Not Asked   Social History Narrative    Born in [place], came to [place] years ago.     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cardboard, trimming meats   smoker for 25 years. One pack for 1 week. Quit in Feb 2018.  Has been a heavy drinker for 30 years, beer about 36 every week. Last drink was around Feb 2018.  No drug use.  Lives with wife. 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  Physical Exam:  There were no vitals taken for this visit.   Wt Readings from Last 4 Encounters:   03/11/20 85.9 kg (189 lb 4.8 oz)   02/18/20 74.8 kg (165 lb)   01/29/20 81.8 kg (180 lb 4.8 oz)   01/13/20 79.4 kg (175 lb)       Constitutional.  Does not seem to be in any acute distress.  Respiratory.  Speaking in full sentences.  No coughing noted.  Neurological.  Alert and oriented x3.  Psychiatric.  Mood, mentation and affect are normal.  Decision making capacity is intact.        Laboratory/Imaging Studies    Reviewed      ASSESSMENT/PLAN:    HCC  S8 3.7 cm OPTN 5B lesion  S7 LIRADS 4 lesions x 2    No evidence of metastatic disease    He had chemoembolization of the S8 lesion on 6/13/18.    As there was residual tumor left, he had microwave ablation of residual mass in hepatic segment 8 on 7/2/2018.     S7 IRADS 4 lesions were not treated    Repeat MRI on 10/3/18 shows no residual viable tumor in S8 lesion  S7 LIRADS 4 lesion was less well defined.  He has some scattered LIRADS 3 lesions.      He had liver transplant in Dec 2018 and explanted liver showed S8 viable tumor with 90% necrosis.  S7 1.1 cm HCC and S5 1.4 cm HCC.  3 benign lymph nodes.  It was a ypT2N0 lesion.    He is now doing well and currently without evidence of recurrence.   I recommend repeating MRI in the  next 6 weeks or so and repeat AFP.  For the 3rd year we will repeat MRI and AFP every 6 months.    Liver transplant. Doing well. Follow with Dr Carballo. He is on tacrolimus.    Pancytopenia- it resolved after liver transplant.    Prostate Cancer. He developed prostate cancer (4/26/19 - PSA 5.51ng/dL) and is now s/p RALP with Dr. Casiano on 1/3/20, final path: Crawford 3+4=7, neg margins, [id number].   Last PSA on 7/3/2020- <0.01.  Cont following with Urology.      We did not address the following today.  Pulmonary nodule-indeterminate 4 mm nodule in  right lower lobe has now resolved. Will not repeat routine imaging for chest for now       RTC after repeat labs/MRI.    All questions answered and he agrees with the plan    Hi Melgar      Total phone call time. 26 minutes      Again, thank you for allowing me to participate in the care of your patient.        Sincerely,        Hi Melgar MD

## 2020-07-09 NOTE — PROGRESS NOTES
Oncology follow up visit:  Date on this visit: 7/9/2020     HCC    Primary Physician: [facility], Oncology     History Of Present Illness:      Please see previous note for details. I have copied and updated from prior note.      Mr. Limon is a 65 year old male who has been a chronic heavy alcohol drinker presented with right abdominal pain in March 2018 and workup including a CT scan showed cirrhosis, portal hypertension and 2 hepatic lesions in the right hepatic lobe which were concerning for hepatocellular carcinoma.  He had MRI on 3/16/2018 which confirmed cirrhosis and portal hypertension and splenomegaly. 3.7 cm segment 8 lesion was OPTN 5B.  There was an antecedent 0.9 cm satellite nodule in hepatic segment 8 consistent with LI-RADS 4.  In segment 7, 1.5 cm LIRADS 4 lesion was seen  Another 1.9 cm segment 7 lesion was seen which was indeterminate.  Several other LIRADS 3 lesions were scattered.  Alpha-fetoprotein was not elevated.  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 Hepatitis B surface antibody was reactive consistent with effective vaccination. Hepatitis C antibody was nonreactive.  He has had no ascites. He has had mild hepatic and Neuropathy but was unable to tolerate lactulose because of abdominal discomfort. He has no history of variceal bleeding although he has grade 2 esophageal varices which have not been banded and he continues to be on propranolol.    He had repeat MRI done on 5/24/2018 which showed stable to slight increase in size of the segment 8 lesion.  Segment 7 lesion was consistent with LIRADS 4 lesion  Another segment 7 lesion is also consistent with LIRADS 4 lesion  CT of the chest showed a 4 mm solitary pulmonary nodule in the right lower lobe which remains indeterminate. There was no other evidence of metastasis.  Bone scan was negative for any evidence of metastatic disease.    He had chemoembolization of the S8  "lesion on 18.    As there was residual tumor left, he had microwave ablation of residual mass in hepatic segment 8 on 2018.     S7 IRADS 4 lesions were not treated    Repeat MRI on 10/3/18 shows no residual viable tumor in S8 lesion  S7 LIRADS 4 lesion was less well defined.  He has some scattered LIRADS 3 lesions.    He had liver transplant in Dec 2018 and explanted liver showed S8 viable tumor with 90% necrosis.  S7 1.1 cm HCC and S5 1.4 cm HCC.  3 benign lymph nodes.  It was a ypT2N0 lesion.    He developed prostate cancer (19 - PSA 5.51ng/dL) and is now s/p RALP with Dr. Casiano on 1/3/20, final path: Struthers 3+4=7, neg margins, [id number].   Last PSA on 7/3/2020- <0.01.    Interval hx:  He last saw me in Oct 2018 before the transplant.  This is a telephone visit and professional [place] Interpretor is available throughout the visit.  He mentions about feeling abdominal distention since the transplant which is slightly better. No  nausea, vomiting, diarrhea or constipation. No bleeding. No recent infections. He describes mild pain in the \"stomach\" and this is going on since transplant. Dr Carballo mentions that it is likely related to his surgery.  No other swellings. No dyspnea. Energy is fair.     ECOG 1    ROS:  A comprehensive ROS was otherwise neg      I reviewed other history in epic as below.    Past Medical/Surgical History:  Past Medical History:   Diagnosis Date     Anemia      Biliary stricture of transplanted liver (H) 2018     BPH (benign prostatic hyperplasia)      Cancer (H)     hepatocellualr carcinoma     Cirrhosis of liver (H) 2018     Diabetes (H)      Enlarged prostate      Hypothyroidism      Inguinal hernia     Repaired with mesh on 18     Liver lesion 2018     Liver transplanted (H) 2018     donor liver transplant     Portal vein thrombosis     on path explant     Postoperative atrial fibrillation (H) 2018     Prostate cancer (H)      TB " "lung, latent     Treated      Past Surgical History:   Procedure Laterality Date     APPENDECTOMY       COLONOSCOPY      2018     DAVINCI PROSTATECTOMY N/A 1/3/2020    Procedure: Robot-assisted laparoscopic radical prostatectomy, Bladder biopsy;  Surgeon: Kenrick Casiano MD;  [facility]     ENDOSCOPIC RETROGRADE CHOLANGIOPANCREATOGRAM N/A 2018    Procedure: ENDOSCOPIC RETROGRADE CHOLANGIOPANCREATOGRAM, with Billary Sphincterotomy and Billary Stent Placement;  Surgeon: Omero Lawler MD;  [facility]     ENDOSCOPIC RETROGRADE CHOLANGIOPANCREATOGRAM  2019    Procedure: COMBINED ENDOSCOPIC RETROGRADE CHOLANGIOPANCREATOGRAPHY, Bile Duct Stent Exchange;  Surgeon: Omero Lawler MD;  [facility]     ENDOSCOPIC RETROGRADE CHOLANGIOPANCREATOGRAM N/A 2019    Procedure: ENDOSCOPIC RETROGRADE CHOLANGIOPANCREATOGRAPHY, WITH BILIARY STENT REMOVAL AND STONE EXTRACTION;  Surgeon: Omero Lawler MD;  [facility]     HERNIORRHAPHY INGUINAL  2018    Procedure: Herniorrhaphy inguinal;  Surgeon: Emil Echevarria MD;  [facility]     IR LIVER BIOPSY PERCUTANEOUS  2018     LAPAROTOMY EXPLORATORY      per the patient \"for infection in the LLQ\"      TRANSPLANT LIVER RECIPIENT  DONOR N/A 2018    Procedure: TRANSPLANT LIVER RECIPIENT  DONOR;  Surgeon: Emil Echevarria MD;  [facility]     Cancer History:   As above    Allergies:  Allergies as of 2020     (No Known Allergies)     Current Medications:  Current Outpatient Medications   Medication Sig Dispense Refill     alfuzosin ER 10 MG PO 24 hr tablet Take 1 tablet (10 mg) by mouth daily DO NOT CRUSH. 90 tablet 3     amLODIPine (NORVASC) 10 MG tablet Take 1 tablet (10 mg) by mouth daily 90 tablet 3     aspirin (ASA) 325 MG EC tablet Take 1 tablet (325 mg) by mouth daily 90 tablet 3     bisacodyl (DULCOLAX) 10 MG suppository Place 1 suppository (10 mg) rectally daily " For constipation. Stop if diarrhea occurs. 10 suppository 1     blood glucose (NO BRAND SPECIFIED) lancets standard Use to test blood sugar 4 times daily or as directed. 200 each 11     blood glucose (ONETOUCH VERIO IQ) test strip Use to test blood sugar 4 times daily or as directed. 200 strip 11     glucose 40 % (400 mg/mL) gel Take 15-30 g by mouth every 15 minutes as needed for low blood sugar 30 g 3     Lancets (ONETOUCH DELICA PLUS [id number]) MISC U TO TEST QID OR UTD       levothyroxine (SYNTHROID/LEVOTHROID) 25 MCG tablet Take 1 tablet (25 mcg) by mouth daily 90 tablet 3     magnesium hydroxide (MILK OF MAGNESIA) 400 MG/5ML suspension Take 30 mLs by mouth daily as needed for constipation or heartburn (Stop if diarrhea occurs) 355 mL 1     metFORMIN (GLUCOPHAGE-XR) 500 MG 24 hr tablet Take 2 tablets (1,000 mg) by mouth 2 times daily (with meals) 360 tablet 2     Metoprolol Tartrate 75 MG TABS Take 75 mg by mouth 2 times daily 180 tablet 2     mineral oil liquid Take 30 mLs by mouth daily For constipation. Stop if diarrhea occurs. 500 mL 1     Multiple Vitamin (DAILY-STEVE) TABS TK 1 T PO D.       polyethylene glycol (MIRALAX) packet Take 17 g by mouth daily 30 packet 3     polyethylene glycol (MIRALAX) powder MIX 17 GRAMS AND DRINK D       Sharps Container MISC 1 each daily 1 each 2     sildenafil (REVATIO) 20 MG tablet Take 3-5 tablets ( mg) by mouth daily as needed (1/2 hour prior to sexual activity) 60 tablet 3     tacrolimus (GENERIC EQUIVALENT) 1 MG capsule Take 3 capsules (3 mg) by mouth every 12 hours 180 capsule 11     ursodiol (ACTIGALL) 250 MG tablet Take 1 tablet (250 mg) by mouth 2 times daily 180 tablet 3      Family History:  Family History   Problem Relation Age of Onset     Memory loss Mother      Liver Disease Brother      Skin Cancer No family hx of      Melanoma No family hx of       Maternal grand mother had Uterus cancer at 63  He has 3 living children. One son with schizophrenia  One  daughter dies of leukemia at 8 years of age  Son  shortly after birth. He had some brain congenital anomaly    Social History:  Social History     Socioeconomic History     Marital status:      Spouse name: Not on file     Number of children: Not on file     Years of education: Not on file     Highest education level: Not on file   Occupational History     Not on file   Social Needs     Financial resource strain: Not on file     Food insecurity     Worry: Not on file     Inability: Not on file     Transportation needs     Medical: Not on file     Non-medical: Not on file   Tobacco Use     Smoking status: Former Smoker     Packs/day: 0.03     Years: 20.00     Pack years: 0.60     Types: Cigarettes, Cigars     Start date: 1970     Last attempt to quit: 3/14/2018     Years since quittin.3     Smokeless tobacco: Never Used     Tobacco comment: Quit smoking 2018, one cigarette after dinner   Substance and Sexual Activity     Alcohol use: No     Frequency: Never     Comment: Quit drinking 2018     Drug use: No     Sexual activity: Not on file   Lifestyle     Physical activity     Days per week: Not on file     Minutes per session: Not on file     Stress: Not on file   Relationships     Social connections     Talks on phone: Not on file     Gets together: Not on file     Attends [employer]ist service: Not on file     Active member of club or organization: Not on file     Attends meetings of clubs or organizations: Not on file     Relationship status: Not on file     Intimate partner violence     Fear of current or ex partner: Not on file     Emotionally abused: Not on file     Physically abused: Not on file     Forced sexual activity: Not on file   Other Topics Concern     Parent/sibling w/ CABG, MI or angioplasty before 65F 55M? Not Asked   Social History Narrative    Born in [place], came to [place] years ago.     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cardboard, trimming meats   smoker for 25 years. One  pack for 1 week. Quit in Feb 2018.  Has been a heavy drinker for 30 years, beer about 36 every week. Last drink was around Feb 2018.  No drug use.  Lives with wife. 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  Physical Exam:  There were no vitals taken for this visit.   Wt Readings from Last 4 Encounters:   03/11/20 85.9 kg (189 lb 4.8 oz)   02/18/20 74.8 kg (165 lb)   01/29/20 81.8 kg (180 lb 4.8 oz)   01/13/20 79.4 kg (175 lb)       Constitutional.  Does not seem to be in any acute distress.  Respiratory.  Speaking in full sentences.  No coughing noted.  Neurological.  Alert and oriented x3.  Psychiatric.  Mood, mentation and affect are normal.  Decision making capacity is intact.        Laboratory/Imaging Studies    Reviewed      ASSESSMENT/PLAN:    HCC  S8 3.7 cm OPTN 5B lesion  S7 LIRADS 4 lesions x 2    No evidence of metastatic disease    He had chemoembolization of the S8 lesion on 6/13/18.    As there was residual tumor left, he had microwave ablation of residual mass in hepatic segment 8 on 7/2/2018.     S7 IRADS 4 lesions were not treated    Repeat MRI on 10/3/18 shows no residual viable tumor in S8 lesion  S7 LIRADS 4 lesion was less well defined.  He has some scattered LIRADS 3 lesions.      He had liver transplant in Dec 2018 and explanted liver showed S8 viable tumor with 90% necrosis.  S7 1.1 cm HCC and S5 1.4 cm HCC.  3 benign lymph nodes.  It was a ypT2N0 lesion.    He is now doing well and currently without evidence of recurrence.   I recommend repeating MRI in the next 6 weeks or so and repeat AFP.  For the 3rd year we will repeat MRI and AFP every 6 months.    Liver transplant. Doing well. Follow with Dr Carballo. He is on tacrolimus.    Pancytopenia- it resolved after liver transplant.    Prostate Cancer. He developed prostate cancer (4/26/19 - PSA 5.51ng/dL) and is now s/p RALP with Dr. Casiano on 1/3/20, final path: Rebecca 3+4=7, neg margins, [id number].   Last PSA on 7/3/2020-  <0.01.  Cont following with Urology.      We did not address the following today.  Pulmonary nodule-indeterminate 4 mm nodule in  right lower lobe has now resolved. Will not repeat routine imaging for chest for now       RTC after repeat labs/MRI.    All questions answered and he agrees with the plan    Hi Melgar      Total phone call time. 26 minutes

## 2020-07-09 NOTE — PROGRESS NOTES
"Loy Limon is a 65 year old male who is being evaluated via a billable telephone visit.      The patient has been notified of following:     \"This telephone visit will be conducted via a call between you and your physician/provider. We have found that certain health care needs can be provided without the need for a physical exam.  This service lets us provide the care you need with a short phone conversation.  If a prescription is necessary we can send it directly to your pharmacy.  If lab work is needed we can place an order for that and you can then stop by our lab to have the test done at a later time.    Telephone visits are billed at different rates depending on your insurance coverage. During this emergency period, for some insurers they may be billed the same as an in-person visit.  Please reach out to your insurance provider with any questions.    If during the course of the call the physician/provider feels a telephone visit is not appropriate, you will not be charged for this service.\"    Patient has given verbal consent for Telephone visit?  Yes    What phone number would you like to be contacted at? [phone]    How would you like to obtain your AVS? Mail a copy     Thomas Soares LPN    Phone call duration: 26 minutes    Hi Melgar MD    "

## 2020-07-15 ENCOUNTER — VIRTUAL VISIT (OUTPATIENT)
Dept: FAMILY MEDICINE | Facility: CLINIC | Age: 65
End: 2020-07-15
Payer: COMMERCIAL

## 2020-07-15 ENCOUNTER — APPOINTMENT (OUTPATIENT)
Dept: INTERPRETER SERVICES | Facility: CLINIC | Age: 65
End: 2020-07-15
Payer: COMMERCIAL

## 2020-07-15 DIAGNOSIS — E11.9 DM TYPE 2, GOAL HBA1C 7%-8% (H): Primary | ICD-10-CM

## 2020-07-15 ASSESSMENT — PAIN SCALES - GENERAL: PAINLEVEL: NO PAIN (0)

## 2020-07-15 NOTE — PROGRESS NOTES
"Loy Limon is a 65 year old male who is being evaluated via a billable telephone visit.      The patient has been notified of following:     \"This telephone visit will be conducted via a call between you and your physician/provider. We have found that certain health care needs can be provided without the need for a physical exam.  This service lets us provide the care you need with a short phone conversation.  If a prescription is necessary we can send it directly to your pharmacy.  If lab work is needed we can place an order for that and you can then stop by our lab to have the test done at a later time.    Telephone visits are billed at different rates depending on your insurance coverage. During this emergency period, for some insurers they may be billed the same as an in-person visit.  Please reach out to your insurance provider with any questions.    If during the course of the call the physician/provider feels a telephone visit is not appropriate, you will not be charged for this service.\"    Patient has given verbal consent for Telephone visit?  Yes    What phone number would you like to be contacted at? In [facility]      How would you like to obtain your AVS? Mail a copy    Subjective     Loy Limon is a 65 year old male who presents via phone visit today for the following health issues:    HPI   Here in f/u   used  25 minute phone call  Prostate ca rvwd, undetectable psa  HCC rvwd, MR done soon per onco I messaaged onco during visit who replied will set up  DM: sugars high at times, no recent hgba1c, sees endo  GI: no n/v, stools normal, bloats at times. No ascites on ultrasound three weeks ago. MR abd soon. At risk obstruciton but not painful or vomiting. Could get SIBO with all his problems.  Eats/drinks normally.    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/28/2018     BPH (benign prostatic hyperplasia)      Cancer (H)     " "hepatocellualr carcinoma     Cirrhosis of liver (H) 2018     Diabetes (H)      Enlarged prostate      Hypothyroidism      Inguinal hernia     Repaired with mesh on 18     Liver lesion 2018     Liver transplanted (H) 2018     donor liver transplant     Portal vein thrombosis     on path explant     Postoperative atrial fibrillation (H) 2018     Prostate cancer (H)      TB lung, latent     Treated      Past Surgical History:   Procedure Laterality Date     APPENDECTOMY       COLONOSCOPY           DAVINCI PROSTATECTOMY N/A 1/3/2020    Procedure: Robot-assisted laparoscopic radical prostatectomy, Bladder biopsy;  Surgeon: Kenrick Casiano MD;  [facility]     ENDOSCOPIC RETROGRADE CHOLANGIOPANCREATOGRAM N/A 2018    Procedure: ENDOSCOPIC RETROGRADE CHOLANGIOPANCREATOGRAM, with Billary Sphincterotomy and Billary Stent Placement;  Surgeon: Omero Lawler MD;  [facility]     ENDOSCOPIC RETROGRADE CHOLANGIOPANCREATOGRAM  2019    Procedure: COMBINED ENDOSCOPIC RETROGRADE CHOLANGIOPANCREATOGRAPHY, Bile Duct Stent Exchange;  Surgeon: Omero Lawler MD;  [facility]     ENDOSCOPIC RETROGRADE CHOLANGIOPANCREATOGRAM N/A 2019    Procedure: ENDOSCOPIC RETROGRADE CHOLANGIOPANCREATOGRAPHY, WITH BILIARY STENT REMOVAL AND STONE EXTRACTION;  Surgeon: Omero Lawler MD;  [facility]     HERNIORRHAPHY INGUINAL  2018    Procedure: Herniorrhaphy inguinal;  Surgeon: Emil Echevarria MD;  [facility]     IR LIVER BIOPSY PERCUTANEOUS  2018     LAPAROTOMY EXPLORATORY      per the patient \"for infection in the LLQ\"      TRANSPLANT LIVER RECIPIENT  DONOR N/A 2018    Procedure: TRANSPLANT LIVER RECIPIENT  DONOR;  Surgeon: Emil Echevarria MD;  [facility]     Current Outpatient Medications   Medication     alfuzosin ER 10 MG PO 24 hr tablet     amLODIPine (NORVASC) 10 MG tablet     aspirin " (ASA) 325 MG EC tablet     bisacodyl (DULCOLAX) 10 MG suppository     blood glucose (NO BRAND SPECIFIED) lancets standard     blood glucose (ONETOUCH VERIO IQ) test strip     glucose 40 % (400 mg/mL) gel     Lancets (ONETOUCH DELICA PLUS [id number]) MISC     levothyroxine (SYNTHROID/LEVOTHROID) 25 MCG tablet     magnesium hydroxide (MILK OF MAGNESIA) 400 MG/5ML suspension     metFORMIN (GLUCOPHAGE-XR) 500 MG 24 hr tablet     Metoprolol Tartrate 75 MG TABS     mineral oil liquid     Multiple Vitamin (DAILY-STEVE) TABS     polyethylene glycol (MIRALAX) packet     polyethylene glycol (MIRALAX) powder     Sharps Container MISC     sildenafil (REVATIO) 20 MG tablet     tacrolimus (GENERIC EQUIVALENT) 1 MG capsule     ursodiol (ACTIGALL) 250 MG tablet     No current facility-administered medications for this visit.      No Known Allergies               Review of Systems          Objective   Reported vitals:  There were no vitals taken for this visit.   healthy, alert and no distress  PSYCH: Alert and oriented times 3; coherent speech, normal   rate and volume, able to articulate logical thoughts, able   to abstract reason, no tangential thoughts, no hallucinations   or delusions  His affect is normal  RESP: No cough, no audible wheezing, able to talk in full sentences  Remainder of exam unable to be completed due to telephone visits    Diagnostic Test Results:  Labs reviewed in Epic        Assessment/Plan:    1. DM type 2, goal HbA1c 7%-8% (H)  I told him do lab this week then I'll contact his endorine provider  - Hemoglobin A1c; Future    2. abd bloat-do MR per onco if no findings consider SIBO work up    See me six mo earlier prn    No follow-ups on file.      Phone call duration:  25 minutes    Jaswinder Anne MD

## 2020-07-15 NOTE — NURSING NOTE
Chief Complaint   Patient presents with     Recheck Medication     pt would like to follow up       Jared Barlow CMA, EMT at 7:38 AM on 7/15/2020.

## 2020-07-16 ENCOUNTER — APPOINTMENT (OUTPATIENT)
Dept: INTERPRETER SERVICES | Facility: CLINIC | Age: 65
End: 2020-07-16
Payer: COMMERCIAL

## 2020-07-16 ENCOUNTER — TELEPHONE (OUTPATIENT)
Dept: ENDOCRINOLOGY | Facility: CLINIC | Age: 65
End: 2020-07-16

## 2020-07-16 DIAGNOSIS — E03.8 OTHER SPECIFIED HYPOTHYROIDISM: Primary | ICD-10-CM

## 2020-07-16 RX ORDER — LEVOTHYROXINE SODIUM 50 UG/1
50 TABLET ORAL DAILY
Qty: 90 TABLET | Refills: 1 | Status: SHIPPED | OUTPATIENT
Start: 2020-07-16 | End: 2020-10-20

## 2020-07-16 NOTE — TELEPHONE ENCOUNTER
----- Message from Gilma Guthrie PA-C sent at 7/16/2020  8:20 AM CDT -----  Ok. Thanks Nikki  Could you please let him know I will be prescribing a new dose of Levothyroxine 50 mcg daily.  He will need a recheck TSH/FT4 in 8 weeks.  Thanks burton guthrie  ----- Message -----  From: Nikki Begum RN  Sent: 7/15/2020   5:27 PM CDT  To: Gilma Guthrie PA-C    He tells me he takes it everyday as prescribed  ----- Message -----  From: Gilma Guthrie PA-C  Sent: 7/14/2020   1:43 PM CDT  To: [facility] Triage-    Could you please call pt and see if he has been taking his Levothyroxine daily as prescribed.  Thank you.  Burton

## 2020-07-16 NOTE — TELEPHONE ENCOUNTER
He will change the dose and do labs in 8 weeks. I will call him in 7 weeks to remind him of this.   Gilma Guthrie PA-C [place], Nikki FERNANDO RN               Ok. Thanks Nikki   Could you please let him know I will be prescribing a new dose of Levothyroxine 50 mcg daily.   He will need a recheck TSH/FT4 in 8 weeks.   Thanks burton Begum RN on 7/16/2020 at 12:33 PM

## 2020-07-17 ENCOUNTER — TELEPHONE (OUTPATIENT)
Dept: ENDOCRINOLOGY | Facility: CLINIC | Age: 65
End: 2020-07-17

## 2020-07-17 NOTE — TELEPHONE ENCOUNTER
Via [place] speaking      Spoke w/ Pt: states He has not taken BS today. Tested while we waited.   BS results: 240 at 09:33AM after breakfast. He was unable to provide additional results at this time. Would like appt as soon as possible. Sent to Clinic Coordinators to schedule w/i 2 weeks.   Maya Brown RN on 7/17/2020 at 9:34 AM

## 2020-07-17 NOTE — TELEPHONE ENCOUNTER
"----- Message from Sharron Rao sent at 7/16/2020  5:01 PM CDT -----  Regarding: FW: blood sugar 496  Can you call to triage and see if add on is necessary? Let clinic coordinators know if scheudling is needed. Thanks!  ----- Message -----  From: Gilma Guthrie PA-C  Sent: 7/15/2020   8:59 AM CDT  To: Sharron Rao  Subject: RE: blood sugar 496                              If his bs is not > 300 at this time, it's ok.  If his bs values are currently > 300, I should have a virtual visit with him next week as an add on.  Thanks burton   ----- Message -----  From: Sharron Rao  Sent: 7/14/2020   4:51 PM CDT  To: Gilma Guthrie PA-C, #  Subject: RE: blood sugar 496                              Your next opening is in 2 weeks. Is this an acceptable time frame? Please \"reply all\" to this message.    Thanks,  -Sharron  ----- Message -----  From: Nikki Begum RN  Sent: 7/14/2020   4:03 PM CDT  To: [id number]  Subject: FW: blood sugar 496                                ----- Message -----  From: Gilma Guthrie PA-C  Sent: 7/14/2020   1:29 PM CDT  To: Lisa Michelle RN, #  Subject: RE: blood sugar 496                              Please schedule pt to have a virtual visit with me and I will need blood sugar data.  Thanks,  Burton guthrie  ----- Message -----  From: Lisa Michelle RN  Sent: 7/3/2020   1:07 PM CDT  To: Gilma Guthrie PA-C  Subject: blood sugar 496                                  HI,    I am one of the liver tx coordinators. He had a blood sugar of 496. He did eat an hour before the blood draw. He did state that he has had higher blood sugars in the morning.    He has gained 7 pounds in the last 4 months per his report.     If your team wants to check base with him or make changes regarding blood sugars.     Thank you  Crystal               "

## 2020-07-20 ENCOUNTER — APPOINTMENT (OUTPATIENT)
Dept: INTERPRETER SERVICES | Facility: CLINIC | Age: 65
End: 2020-07-20
Payer: COMMERCIAL

## 2020-07-20 NOTE — TELEPHONE ENCOUNTER
Patient is scheduled to see you next week. He is not happy about having a virtual appointment. He doesn't think they are doing anything for him. Just wanted it documented. Thanks.

## 2020-07-27 ENCOUNTER — VIRTUAL VISIT (OUTPATIENT)
Dept: INTERPRETER SERVICES | Facility: CLINIC | Age: 65
End: 2020-07-27
Payer: COMMERCIAL

## 2020-07-27 NOTE — PROGRESS NOTES
"Loy Limon is a 65 year old male who is being evaluated via a billable telephone visit.      The patient has been notified of following:     \"This telephone visit will be conducted via a call between you and your physician/provider. We have found that certain health care needs can be provided without the need for a physical exam.  This service lets us provide the care you need with a short phone conversation.  If a prescription is necessary we can send it directly to your pharmacy.  If lab work is needed we can place an order for that and you can then stop by our lab to have the test done at a later time.    Telephone visits are billed at different rates depending on your insurance coverage. During this emergency period, for some insurers they may be billed the same as an in-person visit.  Please reach out to your insurance provider with any questions.    If during the course of the call the physician/provider feels a telephone visit is not appropriate, you will not be charged for this service.\"    Patient has given verbal consent for Telephone visit?  Yes    What phone number would you like to be contacted at? [phone]    How would you like to obtain your AVS? Mail a copy    Tahira Fernandez MA    Patients Glucose Data was obtained via telephone and located in my note.    Due to the COVID 19 pandemic this visit was converted to a telephone visit in order to help prevent spread of infection in this patient and the general population.    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  Telephone visit today for diabetes and hypothyroidism follow up. A  was used today.  Pt was last seen in our clinic by me in March 2020 and  Roma Lira PA-C in 7/2019.  Pt's has a hx of Laennec's cirrhosis with HCC, portal HTN, latent TB who underwent living " donor transplant on 12/22/218.  He developed steroid/immunosuppressant induced diabetes and was treated with NPH insulin which was discontinued once he was no longer taking steroids.  Later follow up showed an A1C value of 8.5 % in May 2019.  He was started on low dose Metformin which was increased to Metformin 500 mg 2 tabs po each am and 2 tabs po each pm.  He denies missing any Metformin doses.  His medical hx is also significant for hx of prostate cancer s/p prostatectomy. He also has HTN, hx of postop atrial fib, anemia and latent TB.  His A1C was 7.2 % on 1/3/2020 and his previous A1C  was 7.3 % .  I reviewed his blood sugar data today and he has intermittent high FBS values.  His blood sugar readings are in the chart.  He eats dinner with his wife around 9 pm and he will sometimes eat again around 11 pm or midnight.  On ROS today, since his prostates surgery he has urinary incontinence, frequency, urgency and abd bloating.   Pt has numbness in both feet.  He denies frequent headaches, blurred vision, n/v, SOB at rest, cough, chest pain, diarrhea, dysuria or hematuria.  Pt denies foot ulcers at this time.    Diabetes Care  Retinopathy: none; pt seen by Oph here in 2/2019.  Nephropathy: yes; urine microalbuminuria + in Oct 2020.   Neuropathy: yes.  Foot Exam: no exam today.  Taking aspirin: yes.  Lipids:LDL 71 in Oct 2019.  CAD:coronary angio 7/2018 showed mild non-obstructive CAD involving the LAD.  Mental health: no depression.  Insulin: none at this time.  DM meds: Metformin BID.  Testing : glucose meter.    ROS  Please see under HPI.    Allergies  No Known Allergies    Medications  Current Outpatient Medications   Medication Sig Dispense Refill     alfuzosin ER 10 MG PO 24 hr tablet Take 1 tablet (10 mg) by mouth daily DO NOT CRUSH. 90 tablet 3     amLODIPine (NORVASC) 10 MG tablet Take 1 tablet (10 mg) by mouth daily 90 tablet 3     aspirin (ASA) 325 MG EC tablet Take 1 tablet (325 mg) by mouth daily 90  tablet 3     bisacodyl (DULCOLAX) 10 MG suppository Place 1 suppository (10 mg) rectally daily For constipation. Stop if diarrhea occurs. 10 suppository 1     blood glucose (NO BRAND SPECIFIED) lancets standard Use to test blood sugar 4 times daily or as directed. 200 each 11     blood glucose (ONETOUCH VERIO IQ) test strip Use to test blood sugar 4 times daily or as directed. 200 strip 11     glucose 40 % (400 mg/mL) gel Take 15-30 g by mouth every 15 minutes as needed for low blood sugar 30 g 3     Lancets (ONETOUCH DELICA PLUS [id number]) MISC U TO TEST QID OR UTD       levothyroxine (SYNTHROID/LEVOTHROID) 50 MCG tablet Take 1 tablet (50 mcg) by mouth daily 90 tablet 1     magnesium hydroxide (MILK OF MAGNESIA) 400 MG/5ML suspension Take 30 mLs by mouth daily as needed for constipation or heartburn (Stop if diarrhea occurs) 355 mL 1     metFORMIN (GLUCOPHAGE-XR) 500 MG 24 hr tablet Take 2 tablets (1,000 mg) by mouth 2 times daily (with meals) 360 tablet 2     Metoprolol Tartrate 75 MG TABS Take 75 mg by mouth 2 times daily 180 tablet 2     mineral oil liquid Take 30 mLs by mouth daily For constipation. Stop if diarrhea occurs. 500 mL 1     Multiple Vitamin (DAILY-STEVE) TABS TK 1 T PO D.       polyethylene glycol (MIRALAX) packet Take 17 g by mouth daily 30 packet 3     polyethylene glycol (MIRALAX) powder MIX 17 GRAMS AND DRINK D       Sharps Container MISC 1 each daily 1 each 2     sildenafil (REVATIO) 20 MG tablet Take 3-5 tablets ( mg) by mouth daily as needed (1/2 hour prior to sexual activity) 60 tablet 3     tacrolimus (GENERIC EQUIVALENT) 1 MG capsule Take 3 capsules (3 mg) by mouth every 12 hours 180 capsule 11     ursodiol (ACTIGALL) 250 MG tablet Take 1 tablet (250 mg) by mouth 2 times daily 180 tablet 3       Family History  family history includes Liver Disease in his brother; Memory loss in his mother.    Social History   reports that he quit smoking about 2 years ago. His smoking use included  cigarettes and cigars. He started smoking about 49 years ago. He has a 0.60 pack-year smoking history. He has never used smokeless tobacco. He reports that he does not drink alcohol or use drugs.     Past Medical History  Past Medical History:   Diagnosis Date     Anemia      Biliary stricture of transplanted liver (H) 2018     BPH (benign prostatic hyperplasia)      Cancer (H)     hepatocellualr carcinoma     Cirrhosis of liver (H) 2018     Diabetes (H)      Enlarged prostate      Hypothyroidism      Inguinal hernia     Repaired with mesh on 18     Liver lesion 2018     Liver transplanted (H) 2018     donor liver transplant     Portal vein thrombosis     on path explant     Postoperative atrial fibrillation (H) 2018     Prostate cancer (H)      TB lung, latent     Treated        Past Surgical History:   Procedure Laterality Date     APPENDECTOMY       COLONOSCOPY      2018     DAVINCI PROSTATECTOMY N/A 1/3/2020    Procedure: Robot-assisted laparoscopic radical prostatectomy, Bladder biopsy;  Surgeon: Kenrick Casiano MD;  [facility]     ENDOSCOPIC RETROGRADE CHOLANGIOPANCREATOGRAM N/A 2018    Procedure: ENDOSCOPIC RETROGRADE CHOLANGIOPANCREATOGRAM, with Billary Sphincterotomy and Billary Stent Placement;  Surgeon: Omero Lawler MD;  [facility]     ENDOSCOPIC RETROGRADE CHOLANGIOPANCREATOGRAM  2019    Procedure: COMBINED ENDOSCOPIC RETROGRADE CHOLANGIOPANCREATOGRAPHY, Bile Duct Stent Exchange;  Surgeon: Omero Lawler MD;  [facility]     ENDOSCOPIC RETROGRADE CHOLANGIOPANCREATOGRAM N/A 2019    Procedure: ENDOSCOPIC RETROGRADE CHOLANGIOPANCREATOGRAPHY, WITH BILIARY STENT REMOVAL AND STONE EXTRACTION;  Surgeon: Omero Lawler MD;  [facility]     HERNIORRHAPHY INGUINAL  2018    Procedure: Herniorrhaphy inguinal;  Surgeon: Emil Echevarria MD;  [facility]     IR LIVER BIOPSY PERCUTANEOUS   "2018     LAPAROTOMY EXPLORATORY      per the patient \"for infection in the LLQ\"      TRANSPLANT LIVER RECIPIENT  DONOR N/A 2018    Procedure: TRANSPLANT LIVER RECIPIENT  DONOR;  Surgeon: Emil Echevarria MD;  [facility]       Physical Exam    No exam.    RESULTS  Creatinine   Date Value Ref Range Status   2020 0.89 0.66 - 1.25 mg/dL Final     GFR Estimate   Date Value Ref Range Status   2020 88 >60 mL/min/[1.73_m2] Final     Comment:     Non  GFR Calc  Starting 2018, serum creatinine based estimated GFR (eGFR) will be   calculated using the Chronic Kidney Disease Epidemiology Collaboration   (CKD-EPI) equation.       Hemoglobin A1C   Date Value Ref Range Status   2020 7.2 (H) 0 - 5.6 % Final     Comment:     Normal <5.7% Prediabetes 5.7-6.4%  Diabetes 6.5% or higher - adopted from ADA   consensus guidelines.       Potassium   Date Value Ref Range Status   2020 5.2 3.4 - 5.3 mmol/L Final     ALT   Date Value Ref Range Status   2020 19 0 - 70 U/L Final     AST   Date Value Ref Range Status   2020 10 0 - 45 U/L Final     TSH   Date Value Ref Range Status   2020 7.88 (H) 0.40 - 4.00 mU/L Final     T4 Free   Date Value Ref Range Status   2020 1.09 0.76 - 1.46 ng/dL Final       Cholesterol   Date Value Ref Range Status   10/23/2019 126 <200 mg/dL Final   2019 128 <200 mg/dL Final     HDL Cholesterol   Date Value Ref Range Status   10/23/2019 38 (L) >39 mg/dL Final   2019 46 >39 mg/dL Final     LDL Cholesterol Calculated   Date Value Ref Range Status   10/23/2019 71 <100 mg/dL Final     Comment:     Desirable:       <100 mg/dl   2019 66 <100 mg/dL Final     Comment:     Desirable:       <100 mg/dl     Triglycerides   Date Value Ref Range Status   10/23/2019 89 <150 mg/dL Final   2019 83 <150 mg/dL Final     A1C  7.2 %  1/3/2020    ASSESSMENT/PLAN:    1. STEROID/IMMUNOSUPPRESSANT INDUCED " DIABETES:  Pt is to remain on Metformin 500 mg 2 tabs po BID and I added Januvia 25 mg each am today.  Pt instructed to check his FBS each am and also check his blood sugar predinner daily and I plan to call him in 1 month to review his blood sugar readings.  I also asked him to not eat after his evening meal.  Encouraged him to make healthy food choices and walk daily.  Reminded him to schedule an Oph exam.  His LDL was 71 in Oct 2019.    2.  NEUROPATHY: Mild. Pt is taking Gabapentin daily.  Pt denies foot ulcers at this time.    3  PROTEINURIA: Reminded him he has a repeat urine microalbuminuria ordered.  Pt's creat was 0.89 with GFR 88 mL/min on 7/32020.    4.  LIVER TRANSPLANT: Pt followed here.    5.  PROSTATE CANCER: Pt being followed here.    6.  THYROID STATUS: TSH slightly elevated at 7.66 mU/L with a normal FT4 in Oct 2020.  He was not taking Levothyroxine that was prescribed.  His TSH was 8.72 mU/L in 3/2020 and TSH was 7.88 mU/L in 7/2020. He denied missing any doses of his Levothyroxine. I increase his Levothyroxine 50 mcg po daily.  I ordered recheck TSH/FT4 to be done in 10 weeks.    7.  FOLLOW UP : with me in 1 month.  A1C has been ordered.

## 2020-07-27 NOTE — PROGRESS NOTES
07/27/2020  160 (fasting)    07/26/2020  180 (fasting)    07/25/2020  135 (fasting)    07/24/2020  175 (fasting)    07/23/2020  230 (not fasting)    07/22/2020  400 (not fasting - 30 minutes after breakfast)    07/21/2020  172 (fasting)    07/20/2020  219 (fasting)    07/19/2020  110 (fasting)    07/18/2020  123 (fasting)

## 2020-07-28 ENCOUNTER — VIRTUAL VISIT (OUTPATIENT)
Dept: ENDOCRINOLOGY | Facility: CLINIC | Age: 65
End: 2020-07-28
Payer: COMMERCIAL

## 2020-07-28 DIAGNOSIS — T38.0X5A STEROID-INDUCED HYPERGLYCEMIA: Primary | ICD-10-CM

## 2020-07-28 DIAGNOSIS — R73.9 STEROID-INDUCED HYPERGLYCEMIA: Primary | ICD-10-CM

## 2020-07-28 NOTE — LETTER
"7/28/2020       RE: Loy Limon  [address]  [facility] [address]     Dear Colleague,    Thank you for referring your patient, Loy Limon, to the [facility] ENDOCRINOLOGY at [facility]. Please see a copy of my visit note below.    Loy Limon is a 65 year old male who is being evaluated via a billable telephone visit.      The patient has been notified of following:     \"This telephone visit will be conducted via a call between you and your physician/provider. We have found that certain health care needs can be provided without the need for a physical exam.  This service lets us provide the care you need with a short phone conversation.  If a prescription is necessary we can send it directly to your pharmacy.  If lab work is needed we can place an order for that and you can then stop by our lab to have the test done at a later time.    Telephone visits are billed at different rates depending on your insurance coverage. During this emergency period, for some insurers they may be billed the same as an in-person visit.  Please reach out to your insurance provider with any questions.    If during the course of the call the physician/provider feels a telephone visit is not appropriate, you will not be charged for this service.\"    Patient has given verbal consent for Telephone visit?  Yes    What phone number would you like to be contacted at? [phone]    How would you like to obtain your AVS? Mail a copy    Tahira Fernandez MA    Patients Glucose Data was obtained via telephone and located in my note.    Due to the COVID 19 pandemic this visit was converted to a telephone visit in order to help prevent spread of infection in this patient and the general population.    Time of start: 7:30 am  Time of end: 7:52 am  Total duration of telephone visit:     HPI  Bee Limon is a 65 year old male with hx of steroid induced diabetes following a liver " transplant.  Telephone visit today for diabetes and hypothyroidism follow up. A  was used today.  Pt was last seen in our clinic by me in March 2020 and  Roma Lira PA-C in 7/2019.  Pt's has a hx of Laennec's cirrhosis with HCC, portal HTN, latent TB who underwent living donor transplant on 12/22/218.  He developed steroid/immunosuppressant induced diabetes and was treated with NPH insulin which was discontinued once he was no longer taking steroids.  Later follow up showed an A1C value of 8.5 % in May 2019.  He was started on low dose Metformin which was increased to Metformin 500 mg 2 tabs po each am and 2 tabs po each pm.  He denies missing any Metformin doses.  His medical hx is also significant for hx of prostate cancer s/p prostatectomy. He also has HTN, hx of postop atrial fib, anemia and latent TB.  His A1C was 7.2 % on 1/3/2020 and his previous A1C  was 7.3 % .  I reviewed his blood sugar data today and he has intermittent high FBS values.  His blood sugar readings are in the chart.  He eats dinner with his wife around 9 pm and he will sometimes eat again around 11 pm or midnight.  On ROS today, since his prostates surgery he has urinary incontinence, frequency, urgency and abd bloating.   Pt has numbness in both feet.  He denies frequent headaches, blurred vision, n/v, SOB at rest, cough, chest pain, diarrhea, dysuria or hematuria.  Pt denies foot ulcers at this time.    Diabetes Care  Retinopathy: none; pt seen by Oph here in 2/2019.  Nephropathy: yes; urine microalbuminuria + in Oct 2020.   Neuropathy: yes.  Foot Exam: no exam today.  Taking aspirin: yes.  Lipids:LDL 71 in Oct 2019.  CAD:coronary angio 7/2018 showed mild non-obstructive CAD involving the LAD.  Mental health: no depression.  Insulin: none at this time.  DM meds: Metformin BID.  Testing : glucose meter.    ROS  Please see under HPI.    Allergies  No Known Allergies    Medications  Current Outpatient Medications    Medication Sig Dispense Refill     alfuzosin ER 10 MG PO 24 hr tablet Take 1 tablet (10 mg) by mouth daily DO NOT CRUSH. 90 tablet 3     amLODIPine (NORVASC) 10 MG tablet Take 1 tablet (10 mg) by mouth daily 90 tablet 3     aspirin (ASA) 325 MG EC tablet Take 1 tablet (325 mg) by mouth daily 90 tablet 3     bisacodyl (DULCOLAX) 10 MG suppository Place 1 suppository (10 mg) rectally daily For constipation. Stop if diarrhea occurs. 10 suppository 1     blood glucose (NO BRAND SPECIFIED) lancets standard Use to test blood sugar 4 times daily or as directed. 200 each 11     blood glucose (ONETOUCH VERIO IQ) test strip Use to test blood sugar 4 times daily or as directed. 200 strip 11     glucose 40 % (400 mg/mL) gel Take 15-30 g by mouth every 15 minutes as needed for low blood sugar 30 g 3     Lancets (ONETOUCH DELICA PLUS [id number]) MISC U TO TEST QID OR UTD       levothyroxine (SYNTHROID/LEVOTHROID) 50 MCG tablet Take 1 tablet (50 mcg) by mouth daily 90 tablet 1     magnesium hydroxide (MILK OF MAGNESIA) 400 MG/5ML suspension Take 30 mLs by mouth daily as needed for constipation or heartburn (Stop if diarrhea occurs) 355 mL 1     metFORMIN (GLUCOPHAGE-XR) 500 MG 24 hr tablet Take 2 tablets (1,000 mg) by mouth 2 times daily (with meals) 360 tablet 2     Metoprolol Tartrate 75 MG TABS Take 75 mg by mouth 2 times daily 180 tablet 2     mineral oil liquid Take 30 mLs by mouth daily For constipation. Stop if diarrhea occurs. 500 mL 1     Multiple Vitamin (DAILY-STEVE) TABS TK 1 T PO D.       polyethylene glycol (MIRALAX) packet Take 17 g by mouth daily 30 packet 3     polyethylene glycol (MIRALAX) powder MIX 17 GRAMS AND DRINK D       Sharps Container MISC 1 each daily 1 each 2     sildenafil (REVATIO) 20 MG tablet Take 3-5 tablets ( mg) by mouth daily as needed (1/2 hour prior to sexual activity) 60 tablet 3     tacrolimus (GENERIC EQUIVALENT) 1 MG capsule Take 3 capsules (3 mg) by mouth every 12 hours 180 capsule  11     ursodiol (ACTIGALL) 250 MG tablet Take 1 tablet (250 mg) by mouth 2 times daily 180 tablet 3       Family History  family history includes Liver Disease in his brother; Memory loss in his mother.    Social History   reports that he quit smoking about 2 years ago. His smoking use included cigarettes and cigars. He started smoking about 49 years ago. He has a 0.60 pack-year smoking history. He has never used smokeless tobacco. He reports that he does not drink alcohol or use drugs.     Past Medical History  Past Medical History:   Diagnosis Date     Anemia      Biliary stricture of transplanted liver (H) 2018     BPH (benign prostatic hyperplasia)      Cancer (H)     hepatocellualr carcinoma     Cirrhosis of liver (H) 2018     Diabetes (H)      Enlarged prostate      Hypothyroidism      Inguinal hernia     Repaired with mesh on 18     Liver lesion 2018     Liver transplanted (H) 2018     donor liver transplant     Portal vein thrombosis     on path explant     Postoperative atrial fibrillation (H) 2018     Prostate cancer (H)      TB lung, latent     Treated        Past Surgical History:   Procedure Laterality Date     APPENDECTOMY       COLONOSCOPY           DAVINCI PROSTATECTOMY N/A 1/3/2020    Procedure: Robot-assisted laparoscopic radical prostatectomy, Bladder biopsy;  Surgeon: Kenrick Casiano MD;  [facility]     ENDOSCOPIC RETROGRADE CHOLANGIOPANCREATOGRAM N/A 2018    Procedure: ENDOSCOPIC RETROGRADE CHOLANGIOPANCREATOGRAM, with Billary Sphincterotomy and Billary Stent Placement;  Surgeon: Omero Lawler MD;  [facility]     ENDOSCOPIC RETROGRADE CHOLANGIOPANCREATOGRAM  2019    Procedure: COMBINED ENDOSCOPIC RETROGRADE CHOLANGIOPANCREATOGRAPHY, Bile Duct Stent Exchange;  Surgeon: Omero Lawler MD;  [facility]     ENDOSCOPIC RETROGRADE CHOLANGIOPANCREATOGRAM N/A 2019    Procedure: ENDOSCOPIC RETROGRADE  "CHOLANGIOPANCREATOGRAPHY, WITH BILIARY STENT REMOVAL AND STONE EXTRACTION;  Surgeon: Omero Lawler MD;  [facility]     HERNIORRHAPHY INGUINAL  2018    Procedure: Herniorrhaphy inguinal;  Surgeon: Emil Echevarria MD;  [facility]     IR LIVER BIOPSY PERCUTANEOUS  2018     LAPAROTOMY EXPLORATORY      per the patient \"for infection in the LLQ\"      TRANSPLANT LIVER RECIPIENT  DONOR N/A 2018    Procedure: TRANSPLANT LIVER RECIPIENT  DONOR;  Surgeon: Emil Echevarria MD;  [facility]       Physical Exam    No exam.    RESULTS  Creatinine   Date Value Ref Range Status   2020 0.89 0.66 - 1.25 mg/dL Final     GFR Estimate   Date Value Ref Range Status   2020 88 >60 mL/min/[1.73_m2] Final     Comment:     Non  GFR Calc  Starting 2018, serum creatinine based estimated GFR (eGFR) will be   calculated using the Chronic Kidney Disease Epidemiology Collaboration   (CKD-EPI) equation.       Hemoglobin A1C   Date Value Ref Range Status   2020 7.2 (H) 0 - 5.6 % Final     Comment:     Normal <5.7% Prediabetes 5.7-6.4%  Diabetes 6.5% or higher - adopted from ADA   consensus guidelines.       Potassium   Date Value Ref Range Status   2020 5.2 3.4 - 5.3 mmol/L Final     ALT   Date Value Ref Range Status   2020 19 0 - 70 U/L Final     AST   Date Value Ref Range Status   2020 10 0 - 45 U/L Final     TSH   Date Value Ref Range Status   2020 7.88 (H) 0.40 - 4.00 mU/L Final     T4 Free   Date Value Ref Range Status   2020 1.09 0.76 - 1.46 ng/dL Final       Cholesterol   Date Value Ref Range Status   10/23/2019 126 <200 mg/dL Final   2019 128 <200 mg/dL Final     HDL Cholesterol   Date Value Ref Range Status   10/23/2019 38 (L) >39 mg/dL Final   2019 46 >39 mg/dL Final     LDL Cholesterol Calculated   Date Value Ref Range Status   10/23/2019 71 <100 mg/dL Final     Comment:     Desirable:       " <100 mg/dl   09/04/2019 66 <100 mg/dL Final     Comment:     Desirable:       <100 mg/dl     Triglycerides   Date Value Ref Range Status   10/23/2019 89 <150 mg/dL Final   09/04/2019 83 <150 mg/dL Final     A1C  7.2 %  1/3/2020    ASSESSMENT/PLAN:    1. STEROID/IMMUNOSUPPRESSANT INDUCED DIABETES:  Pt is to remain on Metformin 500 mg 2 tabs po BID and I added Januvia 25 mg each am today.  Pt instructed to check his FBS each am and also check his blood sugar predinner daily and I plan to call him in 1 month to review his blood sugar readings.  I also asked him to not eat after his evening meal.  Encouraged him to make healthy food choices and walk daily.  Reminded him to schedule an Oph exam.  His LDL was 71 in Oct 2019.    2.  NEUROPATHY: Mild. Pt is taking Gabapentin daily.  Pt denies foot ulcers at this time.    3  PROTEINURIA: Reminded him he has a repeat urine microalbuminuria ordered.  Pt's creat was 0.89 with GFR 88 mL/min on 7/32020.    4.  LIVER TRANSPLANT: Pt followed here.    5.  PROSTATE CANCER: Pt being followed here.    6.  THYROID STATUS: TSH slightly elevated at 7.66 mU/L with a normal FT4 in Oct 2020.  He was not taking Levothyroxine that was prescribed.  His TSH was 8.72 mU/L in 3/2020 and TSH was 7.88 mU/L in 7/2020. He denied missing any doses of his Levothyroxine. I increase his Levothyroxine 50 mcg po daily.  I ordered recheck TSH/FT4 to be done in 10 weeks.    7.  FOLLOW UP : with me in 1 month.  A1C has been ordered.          07/27/2020  160 (fasting)    07/26/2020  180 (fasting)    07/25/2020  135 (fasting)    07/24/2020  175 (fasting)    07/23/2020  230 (not fasting)    07/22/2020  400 (not fasting - 30 minutes after breakfast)    07/21/2020  172 (fasting)    07/20/2020  219 (fasting)    07/19/2020  110 (fasting)    07/18/2020  123 (fasting)        Again, thank you for allowing me to participate in the care of your patient.      Sincerely,    Gilma Guthrie PA-C

## 2020-07-28 NOTE — PATIENT INSTRUCTIONS
Today we added Januvia 25 mg each am.  Continue Metformin 1000 mg twice a day.  Check your blood sugar fasting each am and before your evening meal.  Have lab work done.  I will call you in 1 month.  If you have questions, I can be reached at [phone].  Sincerely,  Gilma Guthrie PA-C

## 2020-08-03 ENCOUNTER — TELEPHONE (OUTPATIENT)
Dept: TRANSPLANT | Facility: CLINIC | Age: 65
End: 2020-08-03

## 2020-08-03 NOTE — TELEPHONE ENCOUNTER
Transplant Social Work Services Phone Call      Data: Referral received from Gilma Guthrie PA-C regarding patient's concern about medication coverage.  Intervention: I called patient along with a .  I routed this message to Roya Mccracken, Liver Transplant Case Manager, to request her assistance in understanding the status of patient's health insurance.  Assessment: Epic shows patient has [facility] [employer] (Medicare and Medical Assistance), effective 4/1/20.    Loy reports he received a letter from [facility] requesting a copy of his paystub.  He has not been able to work since January when he had prostate surgery.  Jennie has no plan to return to working because he says he cannot work due to his need for frequent urination.   Loy reports he picked up his medications from his local [facility] last Friday and he did not pay any copays.  Plan:  I will consult with Roya Mccracken, Liver Transplant Financial Counselor, to determine next steps.      ABRAHAM Gaming, [facility]  Liver Transplant   Phone [phone]  Pager [phone]

## 2020-08-04 NOTE — TELEPHONE ENCOUNTER
Addendum on 8/4/20: Consulted with Roya Mccracken, [facility] .  She reports patient has MA EPD.  She will refer patient to [facility] Financial Counselors to assist patient in applying for straight MA since he is no longer working.  Patient's eligibility will need to be determined (patient and wife's income).    ABRAHAM Gaming, [facility]  Liver Transplant   Phone [phone]  Pager [phone]

## 2020-08-06 ENCOUNTER — APPOINTMENT (OUTPATIENT)
Dept: INTERPRETER SERVICES | Facility: CLINIC | Age: 65
End: 2020-08-06
Payer: COMMERCIAL

## 2020-09-09 ENCOUNTER — TELEPHONE (OUTPATIENT)
Dept: ENDOCRINOLOGY | Facility: CLINIC | Age: 65
End: 2020-09-09

## 2020-09-09 NOTE — TELEPHONE ENCOUNTER
----- Message from Nikki Begum RN sent at 7/16/2020 12:34 PM CDT -----  Regarding: lab reminder  Call in 7 weeks to give 1 week warning to labs needed per Gilma Guthrie after dose change

## 2020-09-09 NOTE — TELEPHONE ENCOUNTER
Message left on home phone with  on labs per Gilma Guthrie are due. He does have  alab appt 9/25/20 already and that date will work as well. Nikki Begum RN on 9/9/2020 at 3:00 PM

## 2020-09-10 ENCOUNTER — VIRTUAL VISIT (OUTPATIENT)
Dept: ONCOLOGY | Facility: CLINIC | Age: 65
End: 2020-09-10
Attending: INTERNAL MEDICINE
Payer: COMMERCIAL

## 2020-09-10 DIAGNOSIS — C22.0 HCC (HEPATOCELLULAR CARCINOMA) (H): Primary | ICD-10-CM

## 2020-09-10 PROCEDURE — 40001009 ZZH VIDEO/TELEPHONE VISIT; NO CHARGE

## 2020-09-10 PROCEDURE — T1013 SIGN LANG/ORAL INTERPRETER: HCPCS | Mod: U3,TEL,ZF

## 2020-09-10 PROCEDURE — 99443 ZZC PHYSICIAN TELEPHONE EVALUATION 21-30 MIN: CPT | Mod: 95 | Performed by: INTERNAL MEDICINE

## 2020-09-10 NOTE — LETTER
"    9/10/2020         RE: Loy Limon  [address]  [place] [address]        Dear Colleague,    Thank you for referring your patient, Loy Limon, to the [facility]. Please see a copy of my visit note below.    Loy Limon is a 65 year old male who is being evaluated via a billable telephone visit.      The patient has been notified of following:     \"This telephone visit will be conducted via a call between you and your physician/provider. We have found that certain health care needs can be provided without the need for a physical exam.  This service lets us provide the care you need with a short phone conversation.  If a prescription is necessary we can send it directly to your pharmacy.  If lab work is needed we can place an order for that and you can then stop by our lab to have the test done at a later time.    Telephone visits are billed at different rates depending on your insurance coverage. During this emergency period, for some insurers they may be billed the same as an in-person visit.  Please reach out to your insurance provider with any questions.    If during the course of the call the physician/provider feels a telephone visit is not appropriate, you will not be charged for this service.\"    Patient has given verbal consent for Telephone visit?  Yes    What phone number would you like to be contacted at? [phone]    How would you like to obtain your AVS? Mail a copy     Vitals - Patient Reported  Weight (Patient Reported): 84.8 kg (187 lb)  Height (Patient Reported): 170.2 cm (5' 7.01\")  BMI (Based on Pt Reported Ht/Wt): 29.28  Pain Score: No Pain (0)     Having issues with stomach bloating constantly.    Patient states, \"If this doctor can't help me, then he is going to move back to [place] and use homemade medication down there that will actually work.\"    Thomas Soares LPN    Phone call duration: 21 minutes      Hi Melgar, " MD          Oncology follow up visit:  Date on this visit: 9/10/2020     HCC    Primary Physician: [facility], Oncology     History Of Present Illness:      Please see previous note for details. I have copied and updated from prior note.      Mr. Limon is a 65 year old male who has been a chronic heavy alcohol drinker presented with right abdominal pain in March 2018 and workup including a CT scan showed cirrhosis, portal hypertension and 2 hepatic lesions in the right hepatic lobe which were concerning for hepatocellular carcinoma.  He had MRI on 3/16/2018 which confirmed cirrhosis and portal hypertension and splenomegaly. 3.7 cm segment 8 lesion was OPTN 5B.  There was an antecedent 0.9 cm satellite nodule in hepatic segment 8 consistent with LI-RADS 4.  In segment 7, 1.5 cm LIRADS 4 lesion was seen  Another 1.9 cm segment 7 lesion was seen which was indeterminate.  Several other LIRADS 3 lesions were scattered.  Alpha-fetoprotein was not elevated.  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 Hepatitis B surface antibody was reactive consistent with effective vaccination. Hepatitis C antibody was nonreactive.  He has had no ascites. He has had mild hepatic and Neuropathy but was unable to tolerate lactulose because of abdominal discomfort. He has no history of variceal bleeding although he has grade 2 esophageal varices which have not been banded and he continues to be on propranolol.    He had repeat MRI done on 5/24/2018 which showed stable to slight increase in size of the segment 8 lesion.  Segment 7 lesion was consistent with LIRADS 4 lesion  Another segment 7 lesion is also consistent with LIRADS 4 lesion  CT of the chest showed a 4 mm solitary pulmonary nodule in the right lower lobe which remains indeterminate. There was no other evidence of metastasis.  Bone scan was negative for any evidence of metastatic disease.    He had  chemoembolization of the S8 lesion on 18.    As there was residual tumor left, he had microwave ablation of residual mass in hepatic segment 8 on 2018.     S7 IRADS 4 lesions were not treated    Repeat MRI on 10/3/18 shows no residual viable tumor in S8 lesion  S7 LIRADS 4 lesion was less well defined.  He has some scattered LIRADS 3 lesions.    He had liver transplant in Dec 2018 and explanted liver showed S8 viable tumor with 90% necrosis.  S7 1.1 cm HCC and S5 1.4 cm HCC.  3 benign lymph nodes.  It was a ypT2N0 lesion.    He developed prostate cancer (19 - PSA 5.51ng/dL) and is now s/p RALP with Dr. Casiano on 1/3/20, final path: [place] 3+4=7, neg margins, [id number].   Last PSA on 7/3/2020- <0.01.    Interval hx:  He saw me in Oct 2018 before the transplant and then in 2020.  This is a telephone visit and professional [place]ian Interpretor is available throughout the visit.  On our previous visit, I had recommended a repeat MRI and AFP but it has not been done.   He has constant abdominal bloating/swelling since his transplant. He feels it more on bending.  He denies pain per se. He denies nausea or vomiting. He has hard stools if he forgets to take stool softeners. No bleeding. Denies any infections. Denies any other swellings apart from occasional leg edema. Breathing is fine.       ECOG 1    ROS:  Rest of the comprehensive review of the system was essentially unremarkable.        I reviewed other history in epic as below.    Past Medical/Surgical History:  Past Medical History:   Diagnosis Date     Anemia      Biliary stricture of transplanted liver (H) 2018     BPH (benign prostatic hyperplasia)      Cancer (H)     hepatocellualr carcinoma     Cirrhosis of liver (H) 2018     Diabetes (H)      Enlarged prostate      Hypothyroidism      Inguinal hernia     Repaired with mesh on 18     Liver lesion 2018     Liver transplanted (H) 2018     donor liver transplant  "    Portal vein thrombosis     on path explant     Postoperative atrial fibrillation (H) 2018     Prostate cancer (H)      TB lung, latent     Treated      Past Surgical History:   Procedure Laterality Date     APPENDECTOMY       COLONOSCOPY      2018     DAVINCI PROSTATECTOMY N/A 1/3/2020    Procedure: Robot-assisted laparoscopic radical prostatectomy, Bladder biopsy;  Surgeon: Kenrick Casiano MD;  Location: [facility]     ENDOSCOPIC RETROGRADE CHOLANGIOPANCREATOGRAM N/A 2018    Procedure: ENDOSCOPIC RETROGRADE CHOLANGIOPANCREATOGRAM, with Billary Sphincterotomy and Billary Stent Placement;  Surgeon: Omero Lawler MD;  Location: [facility]     ENDOSCOPIC RETROGRADE CHOLANGIOPANCREATOGRAM  2019    Procedure: COMBINED ENDOSCOPIC RETROGRADE CHOLANGIOPANCREATOGRAPHY, Bile Duct Stent Exchange;  Surgeon: Omero Lawler MD;  Location: [facility]     ENDOSCOPIC RETROGRADE CHOLANGIOPANCREATOGRAM N/A 2019    Procedure: ENDOSCOPIC RETROGRADE CHOLANGIOPANCREATOGRAPHY, WITH BILIARY STENT REMOVAL AND STONE EXTRACTION;  Surgeon: Omero Lawler MD;  Location: [facility] OR     HERNIORRHAPHY INGUINAL  2018    Procedure: Herniorrhaphy inguinal;  Surgeon: Emil Echevarria MD;  Location: [facility] OR     IR LIVER BIOPSY PERCUTANEOUS  2018     LAPAROTOMY EXPLORATORY      per the patient \"for infection in the LLQ\"      TRANSPLANT LIVER RECIPIENT  DONOR N/A 2018    Procedure: TRANSPLANT LIVER RECIPIENT  DONOR;  Surgeon: Emil Echevarria MD;  Location: [facility] OR     Cancer History:   As above    Allergies:  Allergies as of 09/10/2020     (No Known Allergies)     Current Medications:  Current Outpatient Medications   Medication Sig Dispense Refill     alfuzosin ER 10 MG PO 24 hr tablet Take 1 tablet (10 mg) by mouth daily DO NOT CRUSH. 90 tablet 3     amLODIPine (NORVASC) 10 MG tablet Take 1 tablet (10 mg) by mouth daily 90 tablet 3     aspirin (ASA) 325 MG EC tablet Take " 1 tablet (325 mg) by mouth daily 90 tablet 3     bisacodyl (DULCOLAX) 10 MG suppository Place 1 suppository (10 mg) rectally daily For constipation. Stop if diarrhea occurs. 10 suppository 1     blood glucose (NO BRAND SPECIFIED) lancets standard Use to test blood sugar 4 times daily or as directed. 200 each 11     blood glucose (ONETOUCH VERIO IQ) test strip Use to test blood sugar 4 times daily or as directed. 200 strip 11     glucose 40 % (400 mg/mL) gel Take 15-30 g by mouth every 15 minutes as needed for low blood sugar 30 g 3     Lancets (ONETOUCH DELICA PLUS [id number]) MISC U TO TEST QID OR UTD       levothyroxine (SYNTHROID/LEVOTHROID) 50 MCG tablet Take 1 tablet (50 mcg) by mouth daily 90 tablet 1     magnesium hydroxide (MILK OF MAGNESIA) 400 MG/5ML suspension Take 30 mLs by mouth daily as needed for constipation or heartburn (Stop if diarrhea occurs) 355 mL 1     metFORMIN (GLUCOPHAGE-XR) 500 MG 24 hr tablet Take 2 tablets (1,000 mg) by mouth 2 times daily (with meals) 360 tablet 2     Metoprolol Tartrate 75 MG TABS Take 75 mg by mouth 2 times daily 180 tablet 2     mineral oil liquid Take 30 mLs by mouth daily For constipation. Stop if diarrhea occurs. 500 mL 1     Multiple Vitamin (DAILY-STEVE) TABS TK 1 T PO D.       polyethylene glycol (MIRALAX) packet Take 17 g by mouth daily 30 packet 3     polyethylene glycol (MIRALAX) powder MIX 17 GRAMS AND DRINK D       Sharps Container MISC 1 each daily 1 each 2     sildenafil (REVATIO) 20 MG tablet Take 3-5 tablets ( mg) by mouth daily as needed (1/2 hour prior to sexual activity) 60 tablet 3     sitagliptin (JANUVIA) 25 MG tablet Take 1 tablet (25 mg) by mouth daily 90 tablet 1     tacrolimus (GENERIC EQUIVALENT) 1 MG capsule Take 3 capsules (3 mg) by mouth every 12 hours 180 capsule 11     ursodiol (ACTIGALL) 250 MG tablet Take 1 tablet (250 mg) by mouth 2 times daily 180 tablet 3      Family History:  Family History   Problem Relation Age of Onset      Memory loss Mother      Liver Disease Brother      Skin Cancer No family hx of      Melanoma No family hx of       Maternal grand mother had Uterus cancer at 63  He has 3 living children. One son with schizophrenia  One daughter dies of leukemia at 8 years of age  Son  shortly after birth. He had some brain congenital anomaly    Social History:  Social History     Socioeconomic History     Marital status:      Spouse name: Not on file     Number of children: Not on file     Years of education: Not on file     Highest education level: Not on file   Occupational History     Not on file   Social Needs     Financial resource strain: Not on file     Food insecurity     Worry: Not on file     Inability: Not on file     Transportation needs     Medical: Not on file     Non-medical: Not on file   Tobacco Use     Smoking status: Former Smoker     Packs/day: 0.03     Years: 20.00     Pack years: 0.60     Types: Cigarettes, Cigars     Start date: 1970     Last attempt to quit: 3/14/2018     Years since quittin.4     Smokeless tobacco: Never Used     Tobacco comment: Quit smoking 2018, one cigarette after dinner   Substance and Sexual Activity     Alcohol use: No     Frequency: Never     Comment: Quit drinking 2018     Drug use: No     Sexual activity: Not on file   Lifestyle     Physical activity     Days per week: Not on file     Minutes per session: Not on file     Stress: Not on file   Relationships     Social connections     Talks on phone: Not on file     Gets together: Not on file     Attends Islam service: Not on file     Active member of club or organization: Not on file     Attends meetings of clubs or organizations: Not on file     Relationship status: Not on file     Intimate partner violence     Fear of current or ex partner: Not on file     Emotionally abused: Not on file     Physically abused: Not on file     Forced sexual activity: Not on file   Other Topics Concern      Parent/sibling w/ CABG, MI or angioplasty before 65F 55M? Not Asked   Social History Narrative    Born in [place], came to [place] 30 years ago.     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cardboard, trimming meats   smoker for 25 years. One pack for 1 week. Quit in Feb 2018.  Has been a heavy drinker for 30 years, beer about 36 every week. Last drink was around Feb 2018.  No drug use.  Lives with wife. 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  Physical Exam:  There were no vitals taken for this visit.   Wt Readings from Last 4 Encounters:   03/11/20 85.9 kg (189 lb 4.8 oz)   02/18/20 74.8 kg (165 lb)   01/29/20 81.8 kg (180 lb 4.8 oz)   01/13/20 79.4 kg (175 lb)       Constitutional.  Does not seem to be in any apparent distress.  Respiratory.  Breathing does not seem to be labored.  Speaking in complete sentences without coughing.    Neurological.  Alert and oriented.  Psychiatric.  Appropriate mood and mentation.        Laboratory/Imaging Studies    Previous labs/imaging reviewed      ASSESSMENT/PLAN:    HCC  S8 3.7 cm OPTN 5B lesion  S7 LIRADS 4 lesions x 2    No evidence of metastatic disease    He had chemoembolization of the S8 lesion on 6/13/18.    As there was residual tumor left, he had microwave ablation of residual mass in hepatic segment 8 on 7/2/2018.     S7 IRADS 4 lesions were not treated    Repeat MRI on 10/3/18 shows no residual viable tumor in S8 lesion  S7 LIRADS 4 lesion was less well defined.  He has some scattered LIRADS 3 lesions.      He had liver transplant in Dec 2018 and explanted liver showed S8 viable tumor with 90% necrosis.  S7 1.1 cm HCC and S5 1.4 cm HCC.  3 benign lymph nodes.  It was a ypT2N0 lesion.    We again discussed that I would like him to have a repeat MRI which was not done and repeat AFP.    Abdominal bloating sensation. This has been going on since surgery for the trans plant and I suspect that he feels this because of the damage to abdominal muscles/nerves from  surgery. Potentially an EGD could be done to further evaluate the bloating feeling. I recommend follow up with Dr Carballo.    We did not address the following today.    Pancytopenia- it resolved after liver transplant.    Prostate Cancer. He developed prostate cancer (4/26/19 - PSA 5.51ng/dL) and is now s/p RALP with Dr. Casiano on 1/3/20, final path: [place] 3+4=7, neg margins, [id number].   Last PSA on 7/3/2020- <0.01.  Cont following with Urology.    Pulmonary nodule-indeterminate 4 mm nodule in  right lower lobe has now resolved. Will not repeat routine imaging for chest for now       RTC after repeat labs/MRI.  He was asking if he can get a call as well as letter in the mail and I have sent a message to the schedulers to make sure that he gets a call after the appointments and he also gets a letter in the mail with the appointment so that he has a proper follow-up going forward.    All questions answered and he agrees with the plan    Hi Melgar      Total phone call time. 21 minutes

## 2020-09-10 NOTE — PROGRESS NOTES
"Loy Limon is a 65 year old male who is being evaluated via a billable telephone visit.      The patient has been notified of following:     \"This telephone visit will be conducted via a call between you and your physician/provider. We have found that certain health care needs can be provided without the need for a physical exam.  This service lets us provide the care you need with a short phone conversation.  If a prescription is necessary we can send it directly to your pharmacy.  If lab work is needed we can place an order for that and you can then stop by our lab to have the test done at a later time.    Telephone visits are billed at different rates depending on your insurance coverage. During this emergency period, for some insurers they may be billed the same as an in-person visit.  Please reach out to your insurance provider with any questions.    If during the course of the call the physician/provider feels a telephone visit is not appropriate, you will not be charged for this service.\"    Patient has given verbal consent for Telephone visit?  Yes    What phone number would you like to be contacted at? [phone]    How would you like to obtain your AVS? Mail a copy     Vitals - Patient Reported  Weight (Patient Reported): 84.8 kg (187 lb)  Height (Patient Reported): 170.2 cm (5' 7.01\")  BMI (Based on Pt Reported Ht/Wt): 29.28  Pain Score: No Pain (0)     Having issues with stomach bloating constantly.    Patient states, \"If this doctor can't help me, then he is going to move back to [place] and use homemade medication down there that will actually work.\"    Thomas Soares LPN    Phone call duration: 21 minutes      Hi Melgar MD        "

## 2020-09-10 NOTE — PROGRESS NOTES
Oncology follow up visit:  Date on this visit: 9/10/2020     HCC    Primary Physician: [facility], Oncology     History Of Present Illness:      Please see previous note for details. I have copied and updated from prior note.      Mr. Limon is a 65 year old male who has been a chronic heavy alcohol drinker presented with right abdominal pain in March 2018 and workup including a CT scan showed cirrhosis, portal hypertension and 2 hepatic lesions in the right hepatic lobe which were concerning for hepatocellular carcinoma.  He had MRI on 3/16/2018 which confirmed cirrhosis and portal hypertension and splenomegaly. 3.7 cm segment 8 lesion was OPTN 5B.  There was an antecedent 0.9 cm satellite nodule in hepatic segment 8 consistent with LI-RADS 4.  In segment 7, 1.5 cm LIRADS 4 lesion was seen  Another 1.9 cm segment 7 lesion was seen which was indeterminate.  Several other LIRADS 3 lesions were scattered.  Alpha-fetoprotein was not elevated.  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 Hepatitis B surface antibody was reactive consistent with effective vaccination. Hepatitis C antibody was nonreactive.  He has had no ascites. He has had mild hepatic and Neuropathy but was unable to tolerate lactulose because of abdominal discomfort. He has no history of variceal bleeding although he has grade 2 esophageal varices which have not been banded and he continues to be on propranolol.    He had repeat MRI done on 5/24/2018 which showed stable to slight increase in size of the segment 8 lesion.  Segment 7 lesion was consistent with LIRADS 4 lesion  Another segment 7 lesion is also consistent with LIRADS 4 lesion  CT of the chest showed a 4 mm solitary pulmonary nodule in the right lower lobe which remains indeterminate. There was no other evidence of metastasis.  Bone scan was negative for any evidence of metastatic disease.    He had chemoembolization of the S8  lesion on 18.    As there was residual tumor left, he had microwave ablation of residual mass in hepatic segment 8 on 2018.     S7 IRADS 4 lesions were not treated    Repeat MRI on 10/3/18 shows no residual viable tumor in S8 lesion  S7 LIRADS 4 lesion was less well defined.  He has some scattered LIRADS 3 lesions.    He had liver transplant in Dec 2018 and explanted liver showed S8 viable tumor with 90% necrosis.  S7 1.1 cm HCC and S5 1.4 cm HCC.  3 benign lymph nodes.  It was a ypT2N0 lesion.    He developed prostate cancer (19 - PSA 5.51ng/dL) and is now s/p RALP with Dr. Casiano on 1/3/20, final path: Oakley 3+4=7, neg margins, [id number].   Last PSA on 7/3/2020- <0.01.    Interval hx:  He saw me in Oct 2018 before the transplant and then in 2020.  This is a telephone visit and professional Serbian Interpretor is available throughout the visit.  On our previous visit, I had recommended a repeat MRI and AFP but it has not been done.   He has constant abdominal bloating/swelling since his transplant. He feels it more on bending.  He denies pain per se. He denies nausea or vomiting. He has hard stools if he forgets to take stool softeners. No bleeding. Denies any infections. Denies any other swellings apart from occasional leg edema. Breathing is fine.       ECOG 1    ROS:  Rest of the comprehensive review of the system was essentially unremarkable.        I reviewed other history in epic as below.    Past Medical/Surgical History:  Past Medical History:   Diagnosis Date     Anemia      Biliary stricture of transplanted liver (H) 2018     BPH (benign prostatic hyperplasia)      Cancer (H)     hepatocellualr carcinoma     Cirrhosis of liver (H) 2018     Diabetes (H)      Enlarged prostate      Hypothyroidism      Inguinal hernia     Repaired with mesh on 18     Liver lesion 2018     Liver transplanted (H) 2018     donor liver transplant     Portal vein thrombosis   "   on path explant     Postoperative atrial fibrillation (H) 2018     Prostate cancer (H)      TB lung, latent     Treated      Past Surgical History:   Procedure Laterality Date     APPENDECTOMY       COLONOSCOPY      2018     DAVINCI PROSTATECTOMY N/A 1/3/2020    Procedure: Robot-assisted laparoscopic radical prostatectomy, Bladder biopsy;  Surgeon: Kenrick Casiano MD;  [facility]     ENDOSCOPIC RETROGRADE CHOLANGIOPANCREATOGRAM N/A 2018    Procedure: ENDOSCOPIC RETROGRADE CHOLANGIOPANCREATOGRAM, with Billary Sphincterotomy and Billary Stent Placement;  Surgeon: Omero Lawler MD;  [facility]     ENDOSCOPIC RETROGRADE CHOLANGIOPANCREATOGRAM  2019    Procedure: COMBINED ENDOSCOPIC RETROGRADE CHOLANGIOPANCREATOGRAPHY, Bile Duct Stent Exchange;  Surgeon: Omero Lawler MD;  [facility]     ENDOSCOPIC RETROGRADE CHOLANGIOPANCREATOGRAM N/A 2019    Procedure: ENDOSCOPIC RETROGRADE CHOLANGIOPANCREATOGRAPHY, WITH BILIARY STENT REMOVAL AND STONE EXTRACTION;  Surgeon: Omero Lawler MD;  [facility]     HERNIORRHAPHY INGUINAL  2018    Procedure: Herniorrhaphy inguinal;  Surgeon: Emil Echevarria MD;  [facility]     IR LIVER BIOPSY PERCUTANEOUS  2018     LAPAROTOMY EXPLORATORY      per the patient \"for infection in the LLQ\"      TRANSPLANT LIVER RECIPIENT  DONOR N/A 2018    Procedure: TRANSPLANT LIVER RECIPIENT  DONOR;  Surgeon: Emil Echevarria MD;  [facility]     Cancer History:   As above    Allergies:  Allergies as of 09/10/2020     (No Known Allergies)     Current Medications:  Current Outpatient Medications   Medication Sig Dispense Refill     alfuzosin ER 10 MG PO 24 hr tablet Take 1 tablet (10 mg) by mouth daily DO NOT CRUSH. 90 tablet 3     amLODIPine (NORVASC) 10 MG tablet Take 1 tablet (10 mg) by mouth daily 90 tablet 3     aspirin (ASA) 325 MG EC tablet Take 1 tablet (325 mg) by mouth " daily 90 tablet 3     bisacodyl (DULCOLAX) 10 MG suppository Place 1 suppository (10 mg) rectally daily For constipation. Stop if diarrhea occurs. 10 suppository 1     blood glucose (NO BRAND SPECIFIED) lancets standard Use to test blood sugar 4 times daily or as directed. 200 each 11     blood glucose (ONETOUCH VERIO IQ) test strip Use to test blood sugar 4 times daily or as directed. 200 strip 11     glucose 40 % (400 mg/mL) gel Take 15-30 g by mouth every 15 minutes as needed for low blood sugar 30 g 3     Lancets (ONETOUCH DELICA PLUS [id number]) MISC U TO TEST QID OR UTD       levothyroxine (SYNTHROID/LEVOTHROID) 50 MCG tablet Take 1 tablet (50 mcg) by mouth daily 90 tablet 1     magnesium hydroxide (MILK OF MAGNESIA) 400 MG/5ML suspension Take 30 mLs by mouth daily as needed for constipation or heartburn (Stop if diarrhea occurs) 355 mL 1     metFORMIN (GLUCOPHAGE-XR) 500 MG 24 hr tablet Take 2 tablets (1,000 mg) by mouth 2 times daily (with meals) 360 tablet 2     Metoprolol Tartrate 75 MG TABS Take 75 mg by mouth 2 times daily 180 tablet 2     mineral oil liquid Take 30 mLs by mouth daily For constipation. Stop if diarrhea occurs. 500 mL 1     Multiple Vitamin (DAILY-STEVE) TABS TK 1 T PO D.       polyethylene glycol (MIRALAX) packet Take 17 g by mouth daily 30 packet 3     polyethylene glycol (MIRALAX) powder MIX 17 GRAMS AND DRINK D       Sharps Container MISC 1 each daily 1 each 2     sildenafil (REVATIO) 20 MG tablet Take 3-5 tablets ( mg) by mouth daily as needed (1/2 hour prior to sexual activity) 60 tablet 3     sitagliptin (JANUVIA) 25 MG tablet Take 1 tablet (25 mg) by mouth daily 90 tablet 1     tacrolimus (GENERIC EQUIVALENT) 1 MG capsule Take 3 capsules (3 mg) by mouth every 12 hours 180 capsule 11     ursodiol (ACTIGALL) 250 MG tablet Take 1 tablet (250 mg) by mouth 2 times daily 180 tablet 3      Family History:  Family History   Problem Relation Age of Onset     Memory loss Mother       Liver Disease Brother      Skin Cancer No family hx of      Melanoma No family hx of       Maternal grand mother had Uterus cancer at 63  He has 3 living children. One son with schizophrenia  One daughter dies of leukemia at 8 years of age  Son  shortly after birth. He had some brain congenital anomaly    Social History:  Social History     Socioeconomic History     Marital status:      Spouse name: Not on file     Number of children: Not on file     Years of education: Not on file     Highest education level: Not on file   Occupational History     Not on file   Social Needs     Financial resource strain: Not on file     Food insecurity     Worry: Not on file     Inability: Not on file     Transportation needs     Medical: Not on file     Non-medical: Not on file   Tobacco Use     Smoking status: Former Smoker     Packs/day: 0.03     Years: 20.00     Pack years: 0.60     Types: Cigarettes, Cigars     Start date: 1970     Last attempt to quit: 3/14/2018     Years since quittin.4     Smokeless tobacco: Never Used     Tobacco comment: Quit smoking 2018, one cigarette after dinner   Substance and Sexual Activity     Alcohol use: No     Frequency: Never     Comment: Quit drinking 2018     Drug use: No     Sexual activity: Not on file   Lifestyle     Physical activity     Days per week: Not on file     Minutes per session: Not on file     Stress: Not on file   Relationships     Social connections     Talks on phone: Not on file     Gets together: Not on file     Attends Nondenominational service: Not on file     Active member of club or organization: Not on file     Attends meetings of clubs or organizations: Not on file     Relationship status: Not on file     Intimate partner violence     Fear of current or ex partner: Not on file     Emotionally abused: Not on file     Physically abused: Not on file     Forced sexual activity: Not on file   Other Topics Concern     Parent/sibling w/ CABG, MI or  angioplasty before 65F 55M? Not Asked   Social History Narrative    Born in [place], came to [place] years ago.     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cardboard, trimming meats   smoker for 25 years. One pack for 1 week. Quit in Feb 2018.  Has been a heavy drinker for 30 years, beer about 36 every week. Last drink was around Feb 2018.  No drug use.  Lives with wife. 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  Physical Exam:  There were no vitals taken for this visit.   Wt Readings from Last 4 Encounters:   03/11/20 85.9 kg (189 lb 4.8 oz)   02/18/20 74.8 kg (165 lb)   01/29/20 81.8 kg (180 lb 4.8 oz)   01/13/20 79.4 kg (175 lb)       Constitutional.  Does not seem to be in any apparent distress.  Respiratory.  Breathing does not seem to be labored.  Speaking in complete sentences without coughing.    Neurological.  Alert and oriented.  Psychiatric.  Appropriate mood and mentation.        Laboratory/Imaging Studies    Previous labs/imaging reviewed      ASSESSMENT/PLAN:    HCC  S8 3.7 cm OPTN 5B lesion  S7 LIRADS 4 lesions x 2    No evidence of metastatic disease    He had chemoembolization of the S8 lesion on 6/13/18.    As there was residual tumor left, he had microwave ablation of residual mass in hepatic segment 8 on 7/2/2018.     S7 IRADS 4 lesions were not treated    Repeat MRI on 10/3/18 shows no residual viable tumor in S8 lesion  S7 LIRADS 4 lesion was less well defined.  He has some scattered LIRADS 3 lesions.      He had liver transplant in Dec 2018 and explanted liver showed S8 viable tumor with 90% necrosis.  S7 1.1 cm HCC and S5 1.4 cm HCC.  3 benign lymph nodes.  It was a ypT2N0 lesion.    We again discussed that I would like him to have a repeat MRI which was not done and repeat AFP.    Abdominal bloating sensation. This has been going on since surgery for the trans plant and I suspect that he feels this because of the damage to abdominal muscles/nerves from surgery. Potentially an EGD  could be done to further evaluate the bloating feeling. I recommend follow up with Dr Carballo.    We did not address the following today.    Pancytopenia- it resolved after liver transplant.    Prostate Cancer. He developed prostate cancer (4/26/19 - PSA 5.51ng/dL) and is now s/p RALP with Dr. Casiano on 1/3/20, final path: Rebecca 3+4=7, neg margins, [id number].   Last PSA on 7/3/2020- <0.01.  Cont following with Urology.    Pulmonary nodule-indeterminate 4 mm nodule in  right lower lobe has now resolved. Will not repeat routine imaging for chest for now       RTC after repeat labs/MRI.  He was asking if he can get a call as well as letter in the mail and I have sent a message to the schedulers to make sure that he gets a call after the appointments and he also gets a letter in the mail with the appointment so that he has a proper follow-up going forward.    All questions answered and he agrees with the plan    Hi Melgar      Total phone call time. 21 minutes

## 2020-09-11 ENCOUNTER — APPOINTMENT (OUTPATIENT)
Dept: INTERPRETER SERVICES | Facility: CLINIC | Age: 65
End: 2020-09-11
Payer: COMMERCIAL

## 2020-09-16 ENCOUNTER — DOCUMENTATION ONLY (OUTPATIENT)
Dept: FAMILY MEDICINE | Facility: CLINIC | Age: 65
End: 2020-09-16

## 2020-09-16 ENCOUNTER — APPOINTMENT (OUTPATIENT)
Dept: INTERPRETER SERVICES | Facility: CLINIC | Age: 65
End: 2020-09-16
Payer: COMMERCIAL

## 2020-09-22 ENCOUNTER — APPOINTMENT (OUTPATIENT)
Dept: INTERPRETER SERVICES | Facility: CLINIC | Age: 65
End: 2020-09-22
Payer: COMMERCIAL

## 2020-09-22 ENCOUNTER — PRE VISIT (OUTPATIENT)
Dept: UROLOGY | Facility: CLINIC | Age: 65
End: 2020-09-22

## 2020-09-22 ENCOUNTER — TELEPHONE (OUTPATIENT)
Dept: ENDOCRINOLOGY | Facility: CLINIC | Age: 65
End: 2020-09-22

## 2020-09-22 NOTE — PROGRESS NOTES
Telephone call due to COVID-19 to the client's home for annual health risk assessment.  An  was present.    Current situation/living environment  Client lives with his wife in an apartment in [place].     Activities of daily living (ADL)/instrumental activities of daily living (IADL) and functional issues  Client needs help with the following ADL's: n/a-independent  Client needs help with the following IADL's: n/a-independent  Client states he is unable to perform the above due to n/a-client is independent.      Health concerns for today  Client reports continued bloating in his stomach. He has appointments with gastrology and PCP to discuss.  Has patient fallen 2 or more times in the last year? No  Has patient fallen with injury in the last year? No    Cognition/mental health  Client is A&O x3. He reports some minor forgetfulness. He reports no s/s of depression or anxiety.    STARS/Med Adherence  Client is non-compliant with the following quality measures: Diabetes care - eye exam  Comments: education efforts-client states he will reschedule when it is convenient    Client's Plan of Care consists of:  No formal services.    Amparo Sandoval, DALLIN  [facility] Care Coordinator  [phone]

## 2020-09-22 NOTE — TELEPHONE ENCOUNTER
Chief Complaint : Follow up - 3 Months    Hx/Sx: Prostate cancer, frequency, ED, urinary incontinence    Records/Orders: PSA scheduled for 9/25    Pt Contacted: N/a    At Rooming: To be converted to virtual or telephone    Prabhu Tavares EMT

## 2020-09-22 NOTE — TELEPHONE ENCOUNTER
[facility] Call Center    Phone Message    May a detailed message be left on voicemail: yes     Reason for Call: Medication Question or concern regarding medication   Prescription Clarification  Name of Medication: levothyroxine (SYNTHROID/LEVOTHROID) 50 MCG tablet   Prescribing Provider: Jerri   Pharmacy: [facility] - [place], [place] - [address]   What on the order needs clarification? Rakesh with [facility] is wanting to confirm Pt s dosage for levothyroxine. They have past orders for a couple of different dosages, and also the most current of 50 MCG. Writer unable to speak with nurses to confirm which dosages can be cancelled, and which is current and correct. Please call back to confirm.          Action Taken: Message routed to:  [facility] ([facility] endo    Travel Screening: Not Applicable

## 2020-09-23 ENCOUNTER — OFFICE VISIT (OUTPATIENT)
Dept: GASTROENTEROLOGY | Facility: CLINIC | Age: 65
End: 2020-09-23
Attending: COUNSELOR
Payer: COMMERCIAL

## 2020-09-23 VITALS
BODY MASS INDEX: 29.47 KG/M2 | OXYGEN SATURATION: 97 % | SYSTOLIC BLOOD PRESSURE: 163 MMHG | HEART RATE: 62 BPM | DIASTOLIC BLOOD PRESSURE: 91 MMHG | HEIGHT: 67 IN | WEIGHT: 187.8 LBS | TEMPERATURE: 97.9 F

## 2020-09-23 DIAGNOSIS — Z94.4 LIVER TRANSPLANTED (H): Primary | ICD-10-CM

## 2020-09-23 ASSESSMENT — PAIN SCALES - GENERAL: PAINLEVEL: NO PAIN (0)

## 2020-09-23 ASSESSMENT — MIFFLIN-ST. JEOR: SCORE: 1585.49

## 2020-09-23 NOTE — PROGRESS NOTES
Chief Complaint   Patient presents with     RECHECK     Cirrhosis of liver     Tony Rapp MA    [facility]    Hepatology follow-up    CHIEF COMPLAINT/REASON FOR VISIT:  Status post liver transplantation.      SUBJECTIVE:  Mr. Loy Limon is a 65-year-old male originally from [place].  We did communicate with him through an .  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  He had the hepatocellular carcinoma diagnosis in 2018.  He got listed eventually and had a liver transplantation on 2018.  He had prior to transplantation a bridge therapy with microwave ablation, and his explanted liver showed a viable tumor.  Please note that the patient was seen by our oncologists here this week.      Mr. Limon also had prostate cancer and had surgery and is followed by Urology.  His main issue is urinary incontinence, and he claims that that is the reason why he cannot work.  He also gained a significant amount of weight, mostly in his abdominal area.  He does not have any edema.  He claims to be physically active by moving.  Does not have any fatigue.  He is not jaundiced.  He does not have any shortness of breath or chest pain.  He is moving his bowels 2-3 times a day.  Denies any fever or chills and is compliant with his medication, which is Prograf.  He is supposed to have an MRI tomorrow.     Medical hx Surgical hx   Past Medical History:   Diagnosis Date     Anemia      Biliary stricture of transplanted liver (H) 2018     BPH (benign prostatic hyperplasia)      Cancer (H)     hepatocellualr carcinoma     Cirrhosis of liver (H) 2018     Diabetes (H)      Enlarged prostate      Hypothyroidism      Inguinal hernia     Repaired with mesh on 18     Liver lesion 2018     Liver transplanted (H) 2018     donor liver transplant     Portal vein  "thrombosis     on path explant     Postoperative atrial fibrillation (H) 2018     Prostate cancer (H)      TB lung, latent     Treated       Past Surgical History:   Procedure Laterality Date     APPENDECTOMY       COLONOSCOPY      2018     DAVINCI PROSTATECTOMY N/A 1/3/2020    Procedure: Robot-assisted laparoscopic radical prostatectomy, Bladder biopsy;  Surgeon: Kenrick Casiano MD;  [facility]     ENDOSCOPIC RETROGRADE CHOLANGIOPANCREATOGRAM N/A 2018    Procedure: ENDOSCOPIC RETROGRADE CHOLANGIOPANCREATOGRAM, with Billary Sphincterotomy and Billary Stent Placement;  Surgeon: Omero Lawler MD;  [facility]     ENDOSCOPIC RETROGRADE CHOLANGIOPANCREATOGRAM  2019    Procedure: COMBINED ENDOSCOPIC RETROGRADE CHOLANGIOPANCREATOGRAPHY, Bile Duct Stent Exchange;  Surgeon: Omero Lawler MD;  [facility]     ENDOSCOPIC RETROGRADE CHOLANGIOPANCREATOGRAM N/A 2019    Procedure: ENDOSCOPIC RETROGRADE CHOLANGIOPANCREATOGRAPHY, WITH BILIARY STENT REMOVAL AND STONE EXTRACTION;  Surgeon: Omero Lawler MD;  [facility]     HERNIORRHAPHY INGUINAL  2018    Procedure: Herniorrhaphy inguinal;  Surgeon: Emil Echevarria MD;  [facility]     IR LIVER BIOPSY PERCUTANEOUS  2018     LAPAROTOMY EXPLORATORY      per the patient \"for infection in the LLQ\"      TRANSPLANT LIVER RECIPIENT  DONOR N/A 2018    Procedure: TRANSPLANT LIVER RECIPIENT  DONOR;  Surgeon: Emil Echevarria MD;  [facility]          Medications  Prior to Admission medications    Medication Sig Start Date End Date Taking? Authorizing Provider   alfuzosin ER 10 MG PO 24 hr tablet Take 1 tablet (10 mg) by mouth daily DO NOT CRUSH. 20  Yes Jaswinder Anne MD   amLODIPine (NORVASC) 10 MG tablet Take 1 tablet (10 mg) by mouth daily 20  Yes Jaswinder Anne MD   aspirin (ASA) 325 MG EC tablet Take 1 tablet (325 mg) by mouth daily 20  " Yes Jaswinder Anne MD   bisacodyl (DULCOLAX) 10 MG suppository Place 1 suppository (10 mg) rectally daily For constipation. Stop if diarrhea occurs. 1/5/20  Yes Rob Parker MD   blood glucose (NO BRAND SPECIFIED) lancets standard Use to test blood sugar 4 times daily or as directed. 1/21/20  Yes Jaswinder Anne MD   blood glucose (ONETOUCH VERIO IQ) test strip Use to test blood sugar 4 times daily or as directed. 1/21/20  Yes Jaswinder Anne MD   glucose 40 % (400 mg/mL) gel Take 15-30 g by mouth every 15 minutes as needed for low blood sugar 1/21/20  Yes Jaswinder Anne MD   Lancets (ONETOUCH DELICA PLUS [id number]) MISC U TO TEST QID OR UTD 3/3/20  Yes Reported, Patient   levothyroxine (SYNTHROID/LEVOTHROID) 50 MCG tablet Take 1 tablet (50 mcg) by mouth daily 7/16/20  Yes Gilma Guthrie PA-C   magnesium hydroxide (MILK OF MAGNESIA) 400 MG/5ML suspension Take 30 mLs by mouth daily as needed for constipation or heartburn (Stop if diarrhea occurs) 1/4/20  Yes Rob Parker MD   metFORMIN (GLUCOPHAGE-XR) 500 MG 24 hr tablet Take 2 tablets (1,000 mg) by mouth 2 times daily (with meals) 4/16/20  Yes Jaswinder Anne MD   Metoprolol Tartrate 75 MG TABS Take 75 mg by mouth 2 times daily 5/11/20  Yes Jaswinder Anne MD   mineral oil liquid Take 30 mLs by mouth daily For constipation. Stop if diarrhea occurs. 1/5/20  Yes Rob Parker MD   Multiple Vitamin (DAILY-STEVE) TABS TK 1 T PO D. 3/18/20  Yes Reported, Patient   polyethylene glycol (MIRALAX) packet Take 17 g by mouth daily 2/18/20  Yes Nuvia Pascual PA   polyethylene glycol (MIRALAX) powder MIX 17 GRAMS AND DRINK D 2/18/20  Yes Reported, Patient   Sharps Container MISC 1 each daily 1/7/19  Yes Samuel, Inez Joyce, APRN CNP   sildenafil (REVATIO) 20 MG tablet Take 3-5 tablets ( mg) by mouth daily as needed (1/2 hour prior to sexual activity) 7/7/20  Yes Nuvia Pascual, PA   sitagliptin (JANUVIA) 25 MG  "tablet Take 1 tablet (25 mg) by mouth daily 7/28/20  Yes Gilma Guthrie PA-C   tacrolimus (GENERIC EQUIVALENT) 1 MG capsule Take 3 capsules (3 mg) by mouth every 12 hours 1/22/20  Yes Tamela Carballo MD   ursodiol (ACTIGALL) 250 MG tablet Take 1 tablet (250 mg) by mouth 2 times daily 1/24/20  Yes Tamela Carballo MD       Allergies  No Known Allergies    Review of systems  A 10-point review of systems was negative    Examination  BP (!) 163/91   Pulse 62   Temp 97.9  F (36.6  C) (Oral)   Ht 1.702 m (5' 7\")   Wt 85.2 kg (187 lb 12.8 oz)   SpO2 97%   BMI 29.41 kg/m    Body mass index is 29.41 kg/m .    Gen- well, NAD, A+Ox3, normal color  Lym- no palpable LAD  CVS- RRR  RS- CTA  Abd- Healed surgical scar. Not tender.  Extr- hands normal, no OSKAR  Skin- no rash or jaundice  Neuro- no asterixis  Psych- normal mood    Laboratory  Lab Results   Component Value Date     07/03/2020    POTASSIUM 5.2 07/03/2020    CHLORIDE 106 07/03/2020    CO2 26 07/03/2020    BUN 19 07/03/2020    CR 0.89 07/03/2020       Lab Results   Component Value Date    BILITOTAL 0.8 07/03/2020    ALT 19 07/03/2020    AST 10 07/03/2020    ALKPHOS 186 07/03/2020       Lab Results   Component Value Date    ALBUMIN 3.4 07/03/2020    PROTTOTAL 6.7 07/03/2020        Lab Results   Component Value Date    WBC 4.3 07/03/2020    HGB 13.6 07/03/2020    MCV 85 07/03/2020     07/03/2020       Lab Results   Component Value Date    INR 1.14 12/31/2018       Radiology    ASSESSMENT AND PLAN:  Liver transplantation.        Mr. Limon had a liver transplantation on 12/22/2018 for his alcohol-related liver disease complicated by hepatocellular carcinoma.  He did have prior to the transplantation a microwave  ablation, but his explant showed a viable tumor, and he is having an MRI tomorrow.  He was seen by Oncology.  He also had problems with prostate cancer and had surgery, and his main issue now is incontinence.  He " follows with Urology.  His urinalysis also contained proteins, and it also contained a small amount of blood.  Since the patient has also uncontrolled diabetes, he will need to see both the Diabetes group here at the [place] and Nephrology.  We will send him there.  Mr. Limon also tells me that his blood pressures are running on the high side, and he is on metoprolol and Norvasc.  Today it was 163/91.  He needs, also, to have his Dermatology visit, which we will get an appointment.  Otherwise, he will do his labs per protocol, and he will follow with his primary care physician for his healthcare maintenance issues.      This was a 25 minute visit, of which more than 50% was spent in explaining to the patient what our plan of care was.  We answered all his questions.     Tamela Carballo MD  Hepatology  [facility]

## 2020-09-24 ENCOUNTER — OFFICE VISIT (OUTPATIENT)
Dept: DERMATOLOGY | Facility: CLINIC | Age: 65
End: 2020-09-24
Attending: INTERNAL MEDICINE
Payer: COMMERCIAL

## 2020-09-24 ENCOUNTER — HOSPITAL ENCOUNTER (OUTPATIENT)
Dept: MRI IMAGING | Facility: CLINIC | Age: 65
Discharge: HOME | End: 2020-09-24
Attending: INTERNAL MEDICINE | Admitting: INTERNAL MEDICINE
Payer: COMMERCIAL

## 2020-09-24 DIAGNOSIS — D18.01 CHERRY ANGIOMA: Primary | ICD-10-CM

## 2020-09-24 DIAGNOSIS — L81.4 SOLAR LENTIGO: ICD-10-CM

## 2020-09-24 DIAGNOSIS — C22.0 HCC (HEPATOCELLULAR CARCINOMA) (H): ICD-10-CM

## 2020-09-24 DIAGNOSIS — Z94.4 LIVER TRANSPLANTED (H): ICD-10-CM

## 2020-09-24 DIAGNOSIS — L82.1 SEBORRHEIC KERATOSIS: ICD-10-CM

## 2020-09-24 PROCEDURE — A9585 GADOBUTROL INJECTION: HCPCS | Performed by: INTERNAL MEDICINE

## 2020-09-24 PROCEDURE — 25500064 ZZH RX 255 OP 636: Performed by: INTERNAL MEDICINE

## 2020-09-24 PROCEDURE — 74183 MRI ABD W/O CNTR FLWD CNTR: CPT

## 2020-09-24 RX ORDER — GADOBUTROL 604.72 MG/ML
8 INJECTION INTRAVENOUS ONCE
Status: COMPLETED | OUTPATIENT
Start: 2020-09-24 | End: 2020-09-24

## 2020-09-24 RX ADMIN — GADOBUTROL 8 ML: 604.72 INJECTION INTRAVENOUS at 08:19

## 2020-09-24 ASSESSMENT — PAIN SCALES - GENERAL: PAINLEVEL: NO PAIN (0)

## 2020-09-24 NOTE — LETTER
9/24/2020       RE: Loy Limon  [address]  [facility] [address]     Dear Colleague,    Thank you for referring your patient, Loy Limon, to the [facility] DERMATOLOGY at [facility]. Please see a copy of my visit note below.    [facility] Dermatology Note      Dermatology Problem List:  1.s/p Liver transplant 12/2018 2/2 EtOH cirrhosis c/b HCC, on tacrolimus  2. Skin sensitivity/peripheral neuropathy post transplant in setting of new diagnosis of diabetes (A1c 8.6%; up from 5.8% pre-transplant)  3. Hyperhidrosis of the soles of feet  - Current Tx: Aluminum chloride 20% solution     Encounter Date: Sep 24, 2020    CC:   Chief Complaint   Patient presents with     Skin Check     Jennie is here for a skin check with no areas of concern.          History of Present Illness:  Mr. Loy Limon is a 65 year old male who presents as a referral from Dr. Anne for annual full body skin exam in the setting of solid organ transplant on immunosuppressant regimen. Patient has a history of alcoholic cirrhosis complicated by HCC, s/p DDLT 12/2018, immunosuppressed on tacrolimus alone. He denies personal or family history of skin cancer. He reports reducing his skin exposure as advised by his transplant physicians and wears sunscreen, protective clothing, and avoids time in the sun. He does not have any concerning skin changes or lesions that he would like examined in particular today. The patient otherwise denies any new or concerning lesions. No bleeding, painful, pruritic, or changing lesions. Health otherwise stable. No other skin concerns.       Past Medical History:   Patient Active Problem List   Diagnosis     Cirrhosis of liver (H)     Liver lesion     HCC (hepatocellular carcinoma) (H)     Liver transplant recipient (H)     Immunosuppressed status (H)     Hypervolemia     Atrial fibrillation with rapid ventricular response (H)      Hematuria     Bilateral wheezing     Hypoxia     Hyperglycemia     Pre-diabetes     Essential hypertension     Constipation     Biliary stricture of transplanted liver (H)     Drug-induced diabetes mellitus (H)     Muscle tension dysphonia     Laennec's cirrhosis (H)     Elevated ferritin     Inguinal hernia of right side with obstruction and without gangrene     Portal hypertension (H)     Right sided abdominal pain     Tobacco use disorder     Prostate cancer (H)     Past Medical History:   Diagnosis Date     Anemia      Biliary stricture of transplanted liver (H) 2018     BPH (benign prostatic hyperplasia)      Cancer (H)     hepatocellualr carcinoma     Cirrhosis of liver (H) 2018     Diabetes (H)      Enlarged prostate      Hypothyroidism      Inguinal hernia     Repaired with mesh on 18     Liver lesion 2018     Liver transplanted (H) 2018     donor liver transplant     Portal vein thrombosis     on path explant     Postoperative atrial fibrillation (H) 2018     Prostate cancer (H)      TB lung, latent     Treated      Past Surgical History:   Procedure Laterality Date     APPENDECTOMY       COLONOSCOPY      2018     DAVINCI PROSTATECTOMY N/A 1/3/2020    Procedure: Robot-assisted laparoscopic radical prostatectomy, Bladder biopsy;  Surgeon: Kenrick Casiano MD;  [facility]     ENDOSCOPIC RETROGRADE CHOLANGIOPANCREATOGRAM N/A 2018    Procedure: ENDOSCOPIC RETROGRADE CHOLANGIOPANCREATOGRAM, with Billary Sphincterotomy and Billary Stent Placement;  Surgeon: Omero Lawler MD;  [facility]     ENDOSCOPIC RETROGRADE CHOLANGIOPANCREATOGRAM  2019    Procedure: COMBINED ENDOSCOPIC RETROGRADE CHOLANGIOPANCREATOGRAPHY, Bile Duct Stent Exchange;  Surgeon: Omero Lawler MD;  [facility]     ENDOSCOPIC RETROGRADE CHOLANGIOPANCREATOGRAM N/A 2019    Procedure: ENDOSCOPIC RETROGRADE CHOLANGIOPANCREATOGRAPHY, WITH BILIARY STENT  "REMOVAL AND STONE EXTRACTION;  Surgeon: Omero Lawler MD;  [facility]     HERNIORRHAPHY INGUINAL  2018    Procedure: Herniorrhaphy inguinal;  Surgeon: Emil Echevarria MD;  [facility]     IR LIVER BIOPSY PERCUTANEOUS  2018     LAPAROTOMY EXPLORATORY      per the patient \"for infection in the LLQ\"      TRANSPLANT LIVER RECIPIENT  DONOR N/A 2018    Procedure: TRANSPLANT LIVER RECIPIENT  DONOR;  Surgeon: Emil Echevarria MD;  [facility]       Social History:  Patient reports that he quit smoking about 2 years ago. His smoking use included cigarettes and cigars. He started smoking about 50 years ago. He has a 0.60 pack-year smoking history. He has never used smokeless tobacco. He reports that he does not drink alcohol or use drugs.    Family History:  Family History   Problem Relation Age of Onset     Memory loss Mother      Liver Disease Brother      Skin Cancer No family hx of      Melanoma No family hx of        Medications:  Current Outpatient Medications   Medication Sig Dispense Refill     alfuzosin ER 10 MG PO 24 hr tablet Take 1 tablet (10 mg) by mouth daily DO NOT CRUSH. 90 tablet 3     amLODIPine (NORVASC) 10 MG tablet Take 1 tablet (10 mg) by mouth daily 90 tablet 3     aspirin (ASA) 325 MG EC tablet Take 1 tablet (325 mg) by mouth daily 90 tablet 3     bisacodyl (DULCOLAX) 10 MG suppository Place 1 suppository (10 mg) rectally daily For constipation. Stop if diarrhea occurs. 10 suppository 1     blood glucose (NO BRAND SPECIFIED) lancets standard Use to test blood sugar 4 times daily or as directed. 200 each 11     blood glucose (ONETOUCH VERIO IQ) test strip Use to test blood sugar 4 times daily or as directed. 200 strip 11     glucose 40 % (400 mg/mL) gel Take 15-30 g by mouth every 15 minutes as needed for low blood sugar 30 g 3     Lancets (ONETOUCH DELICA PLUS [id number]) MISC U TO TEST QID OR UTD       levothyroxine " (SYNTHROID/LEVOTHROID) 50 MCG tablet Take 1 tablet (50 mcg) by mouth daily 90 tablet 1     magnesium hydroxide (MILK OF MAGNESIA) 400 MG/5ML suspension Take 30 mLs by mouth daily as needed for constipation or heartburn (Stop if diarrhea occurs) 355 mL 1     metFORMIN (GLUCOPHAGE-XR) 500 MG 24 hr tablet Take 2 tablets (1,000 mg) by mouth 2 times daily (with meals) 360 tablet 2     Metoprolol Tartrate 75 MG TABS Take 75 mg by mouth 2 times daily 180 tablet 2     mineral oil liquid Take 30 mLs by mouth daily For constipation. Stop if diarrhea occurs. 500 mL 1     Multiple Vitamin (DAILY-STEVE) TABS TK 1 T PO D.       polyethylene glycol (MIRALAX) packet Take 17 g by mouth daily 30 packet 3     polyethylene glycol (MIRALAX) powder MIX 17 GRAMS AND DRINK D       Sharps Container MISC 1 each daily 1 each 2     sildenafil (REVATIO) 20 MG tablet Take 3-5 tablets ( mg) by mouth daily as needed (1/2 hour prior to sexual activity) 60 tablet 3     sitagliptin (JANUVIA) 25 MG tablet Take 1 tablet (25 mg) by mouth daily 90 tablet 1     tacrolimus (GENERIC EQUIVALENT) 1 MG capsule Take 3 capsules (3 mg) by mouth every 12 hours 180 capsule 11     ursodiol (ACTIGALL) 250 MG tablet Take 1 tablet (250 mg) by mouth 2 times daily 180 tablet 3        No Known Allergies      Review of Systems:  -Skin/Heme New Pt: The patient denies frequent sun exposure. The patient denies excessive scarring or problems healing except as per HPI. The patient denies excessive bleeding.  -Constitutional: Otherwise feeling well today, in usual state of health.  -HEENT: Patient denies nonhealing oral sores.  -Skin: As above in HPI. No additional skin concerns.    Physical exam:  Vitals: There were no vitals taken for this visit.  GEN: This is a well developed, well-nourished male in no acute distress, in a pleasant mood.    SKIN: Full skin, which includes the head/face, both arms, chest, back, abdomen,both legs, genitalia and/or groin buttocks, digits  and/or nails, was examined.  -Doll skin type: IV  -There are dome shaped bright red papules on the chest and back.   - Multiple regular brown pigmented macules and papules are identified on the chest, back, arms, and legs with no concerning features with dermoscopy.  - There are skin colored pedunculated papules on the underarms.   - There are waxy stuck on skin-colored papules on the back and chest  - Brown macules on sun exposed areas   - No other lesions of concern on areas examined.     Impression/Plan:    1. Benign skin findings: cherry angiomas, benign nevi, seborrheic keratoses, skin tags, solar lentigos  - ABCDs of melanoma were discussed and self skin checks were advised.   - Sun precaution was advised including the use of sun screens of SPF 30 or higher, sun protective clothing, and avoidance of tanning beds.  - Return in 1 year for repeat full body skin exam    2. History of liver transplantation on tacrolimus. Discussed increased risk for skin cancer in organ transplant recipients due to iatrogenic immunosuppression. Sun protection advised. Continue annual skin examinations.     CC Jaswinder Anne MD  [address] [place] [address] on close of this encounter.  Follow-up in 1 year, earlier for new or changing lesions.     Dr. Gonzalez staffed the patient.    Staff Involved:  Resident(Kerri Musa MD, Med PGY-2)/Staff    Staff Physician Comments:   I saw and evaluated the patient with the resident and I agree with the assessment and plan.  I was present for the examination.    Kevin Gonzalez MD  Pronouns: he/him/his    Department of Dermatology  [facility]: Phone: [phone], Fax:[phone]  [facility]: Phone: [phone] Fax: [phone]

## 2020-09-24 NOTE — NURSING NOTE
Dermatology Rooming Note    Loy Limon's goals for this visit include:   Chief Complaint   Patient presents with     Skin Check     Jennie is here for a skin check with no areas of concern.      Roz Lawson LPN

## 2020-09-24 NOTE — PROGRESS NOTES
[facility] Dermatology Note      Dermatology Problem List:  1.s/p Liver transplant 12/2018 2/2 EtOH cirrhosis c/b HCC, on tacrolimus  2. Skin sensitivity/peripheral neuropathy post transplant in setting of new diagnosis of diabetes (A1c 8.6%; up from 5.8% pre-transplant)  3. Hyperhidrosis of the soles of feet  - Current Tx: Aluminum chloride 20% solution     Encounter Date: Sep 24, 2020    CC:   Chief Complaint   Patient presents with     Skin Check     Jennie is here for a skin check with no areas of concern.          History of Present Illness:  Mr. Loy Limon is a 65 year old male who presents as a referral from Dr. Anne for annual full body skin exam in the setting of solid organ transplant on immunosuppressant regimen. Patient has a history of alcoholic cirrhosis complicated by HCC, s/p DDLT 12/2018, immunosuppressed on tacrolimus alone. He denies personal or family history of skin cancer. He reports reducing his skin exposure as advised by his transplant physicians and wears sunscreen, protective clothing, and avoids time in the sun. He does not have any concerning skin changes or lesions that he would like examined in particular today. The patient otherwise denies any new or concerning lesions. No bleeding, painful, pruritic, or changing lesions. Health otherwise stable. No other skin concerns.       Past Medical History:   Patient Active Problem List   Diagnosis     Cirrhosis of liver (H)     Liver lesion     HCC (hepatocellular carcinoma) (H)     Liver transplant recipient (H)     Immunosuppressed status (H)     Hypervolemia     Atrial fibrillation with rapid ventricular response (H)     Hematuria     Bilateral wheezing     Hypoxia     Hyperglycemia     Pre-diabetes     Essential hypertension     Constipation     Biliary stricture of transplanted liver (H)     Drug-induced diabetes mellitus (H)     Muscle tension dysphonia     Laennec's cirrhosis (H)     Elevated ferritin      Inguinal hernia of right side with obstruction and without gangrene     Portal hypertension (H)     Right sided abdominal pain     Tobacco use disorder     Prostate cancer (H)     Past Medical History:   Diagnosis Date     Anemia      Biliary stricture of transplanted liver (H) 2018     BPH (benign prostatic hyperplasia)      Cancer (H)     hepatocellualr carcinoma     Cirrhosis of liver (H) 2018     Diabetes (H)      Enlarged prostate      Hypothyroidism      Inguinal hernia     Repaired with mesh on 18     Liver lesion 2018     Liver transplanted (H) 2018     donor liver transplant     Portal vein thrombosis     on path explant     Postoperative atrial fibrillation (H) 2018     Prostate cancer (H)      TB lung, latent     Treated      Past Surgical History:   Procedure Laterality Date     APPENDECTOMY       COLONOSCOPY           DAVINCI PROSTATECTOMY N/A 1/3/2020    Procedure: Robot-assisted laparoscopic radical prostatectomy, Bladder biopsy;  Surgeon: Kenrick Casiano MD;  [facility]     ENDOSCOPIC RETROGRADE CHOLANGIOPANCREATOGRAM N/A 2018    Procedure: ENDOSCOPIC RETROGRADE CHOLANGIOPANCREATOGRAM, with Billary Sphincterotomy and Billary Stent Placement;  Surgeon: Omero Lawler MD;  [facility]     ENDOSCOPIC RETROGRADE CHOLANGIOPANCREATOGRAM  2019    Procedure: COMBINED ENDOSCOPIC RETROGRADE CHOLANGIOPANCREATOGRAPHY, Bile Duct Stent Exchange;  Surgeon: Omero Lawler MD;  [facility]     ENDOSCOPIC RETROGRADE CHOLANGIOPANCREATOGRAM N/A 2019    Procedure: ENDOSCOPIC RETROGRADE CHOLANGIOPANCREATOGRAPHY, WITH BILIARY STENT REMOVAL AND STONE EXTRACTION;  Surgeon: Omero Lawler MD;  [facility]     HERNIORRHAPHY INGUINAL  2018    Procedure: Herniorrhaphy inguinal;  Surgeon: Emil Echevarria MD;  [facility]     IR LIVER BIOPSY PERCUTANEOUS  2018     LAPAROTOMY EXPLORATORY       "per the patient \"for infection in the LLQ\"      TRANSPLANT LIVER RECIPIENT  DONOR N/A 2018    Procedure: TRANSPLANT LIVER RECIPIENT  DONOR;  Surgeon: Emil Echevarria MD;  Location:  OR       Social History:  Patient reports that he quit smoking about 2 years ago. His smoking use included cigarettes and cigars. He started smoking about 50 years ago. He has a 0.60 pack-year smoking history. He has never used smokeless tobacco. He reports that he does not drink alcohol or use drugs.    Family History:  Family History   Problem Relation Age of Onset     Memory loss Mother      Liver Disease Brother      Skin Cancer No family hx of      Melanoma No family hx of        Medications:  Current Outpatient Medications   Medication Sig Dispense Refill     alfuzosin ER 10 MG PO 24 hr tablet Take 1 tablet (10 mg) by mouth daily DO NOT CRUSH. 90 tablet 3     amLODIPine (NORVASC) 10 MG tablet Take 1 tablet (10 mg) by mouth daily 90 tablet 3     aspirin (ASA) 325 MG EC tablet Take 1 tablet (325 mg) by mouth daily 90 tablet 3     bisacodyl (DULCOLAX) 10 MG suppository Place 1 suppository (10 mg) rectally daily For constipation. Stop if diarrhea occurs. 10 suppository 1     blood glucose (NO BRAND SPECIFIED) lancets standard Use to test blood sugar 4 times daily or as directed. 200 each 11     blood glucose (ONETOUCH VERIO IQ) test strip Use to test blood sugar 4 times daily or as directed. 200 strip 11     glucose 40 % (400 mg/mL) gel Take 15-30 g by mouth every 15 minutes as needed for low blood sugar 30 g 3     Lancets (ONETOUCH DELICA PLUS [id number]) MISC U TO TEST QID OR UTD       levothyroxine (SYNTHROID/LEVOTHROID) 50 MCG tablet Take 1 tablet (50 mcg) by mouth daily 90 tablet 1     magnesium hydroxide (MILK OF MAGNESIA) 400 MG/5ML suspension Take 30 mLs by mouth daily as needed for constipation or heartburn (Stop if diarrhea occurs) 355 mL 1     metFORMIN (GLUCOPHAGE-XR) 500 MG 24 hr tablet Take 2 " tablets (1,000 mg) by mouth 2 times daily (with meals) 360 tablet 2     Metoprolol Tartrate 75 MG TABS Take 75 mg by mouth 2 times daily 180 tablet 2     mineral oil liquid Take 30 mLs by mouth daily For constipation. Stop if diarrhea occurs. 500 mL 1     Multiple Vitamin (DAILY-STEVE) TABS TK 1 T PO D.       polyethylene glycol (MIRALAX) packet Take 17 g by mouth daily 30 packet 3     polyethylene glycol (MIRALAX) powder MIX 17 GRAMS AND DRINK D       Sharps Container MISC 1 each daily 1 each 2     sildenafil (REVATIO) 20 MG tablet Take 3-5 tablets ( mg) by mouth daily as needed (1/2 hour prior to sexual activity) 60 tablet 3     sitagliptin (JANUVIA) 25 MG tablet Take 1 tablet (25 mg) by mouth daily 90 tablet 1     tacrolimus (GENERIC EQUIVALENT) 1 MG capsule Take 3 capsules (3 mg) by mouth every 12 hours 180 capsule 11     ursodiol (ACTIGALL) 250 MG tablet Take 1 tablet (250 mg) by mouth 2 times daily 180 tablet 3        No Known Allergies      Review of Systems:  -Skin/Heme New Pt: The patient denies frequent sun exposure. The patient denies excessive scarring or problems healing except as per HPI. The patient denies excessive bleeding.  -Constitutional: Otherwise feeling well today, in usual state of health.  -HEENT: Patient denies nonhealing oral sores.  -Skin: As above in HPI. No additional skin concerns.    Physical exam:  Vitals: There were no vitals taken for this visit.  GEN: This is a well developed, well-nourished male in no acute distress, in a pleasant mood.    SKIN: Full skin, which includes the head/face, both arms, chest, back, abdomen,both legs, genitalia and/or groin buttocks, digits and/or nails, was examined.  -Doll skin type: IV  -There are dome shaped bright red papules on the chest and back.   - Multiple regular brown pigmented macules and papules are identified on the chest, back, arms, and legs with no concerning features with dermoscopy.  - There are skin colored pedunculated  papules on the underarms.   - There are waxy stuck on skin-colored papules on the back and chest  - Brown macules on sun exposed areas   - No other lesions of concern on areas examined.     Impression/Plan:    1. Benign skin findings: cherry angiomas, benign nevi, seborrheic keratoses, skin tags, solar lentigos  - ABCDs of melanoma were discussed and self skin checks were advised.   - Sun precaution was advised including the use of sun screens of SPF 30 or higher, sun protective clothing, and avoidance of tanning beds.  - Return in 1 year for repeat full body skin exam    2. History of liver transplantation on tacrolimus. Discussed increased risk for skin cancer in organ transplant recipients due to iatrogenic immunosuppression. Sun protection advised. Continue annual skin examinations.     CC Jaswinder Anne MD  [address] [place] [address] on close of this encounter.  Follow-up in 1 year, earlier for new or changing lesions.     Dr. Gonzalez staffed the patient.    Staff Involved:  Resident(Kerri Musa MD, Med PGY-2)/Staff    Staff Physician Comments:   I saw and evaluated the patient with the resident and I agree with the assessment and plan.  I was present for the examination.    Kevin Gonzalez MD  Pronouns: he/him/his    Department of Dermatology  [facility]: Phone: [phone], Fax:[phone]  [facility]: Phone: [phone] Fax: [phone]

## 2020-09-25 ENCOUNTER — OFFICE VISIT (OUTPATIENT)
Dept: FAMILY MEDICINE | Facility: CLINIC | Age: 65
End: 2020-09-25
Payer: COMMERCIAL

## 2020-09-25 ENCOUNTER — TELEPHONE (OUTPATIENT)
Dept: FAMILY MEDICINE | Facility: CLINIC | Age: 65
End: 2020-09-25

## 2020-09-25 ENCOUNTER — TELEPHONE (OUTPATIENT)
Dept: TRANSPLANT | Facility: CLINIC | Age: 65
End: 2020-09-25

## 2020-09-25 ENCOUNTER — APPOINTMENT (OUTPATIENT)
Dept: INTERPRETER SERVICES | Facility: CLINIC | Age: 65
End: 2020-09-25
Payer: COMMERCIAL

## 2020-09-25 VITALS
WEIGHT: 186.6 LBS | SYSTOLIC BLOOD PRESSURE: 124 MMHG | BODY MASS INDEX: 29.23 KG/M2 | HEART RATE: 61 BPM | OXYGEN SATURATION: 97 % | DIASTOLIC BLOOD PRESSURE: 69 MMHG

## 2020-09-25 DIAGNOSIS — E03.8 OTHER SPECIFIED HYPOTHYROIDISM: ICD-10-CM

## 2020-09-25 DIAGNOSIS — E11.9 DM TYPE 2, GOAL HBA1C 7%-8% (H): ICD-10-CM

## 2020-09-25 DIAGNOSIS — Z23 NEED FOR INFLUENZA VACCINATION: ICD-10-CM

## 2020-09-25 DIAGNOSIS — E11.65 UNCONTROLLED TYPE 2 DIABETES MELLITUS WITH HYPERGLYCEMIA (H): ICD-10-CM

## 2020-09-25 DIAGNOSIS — Z94.4 LIVER REPLACED BY TRANSPLANT (H): ICD-10-CM

## 2020-09-25 DIAGNOSIS — K59.00 CONSTIPATION, UNSPECIFIED CONSTIPATION TYPE: ICD-10-CM

## 2020-09-25 DIAGNOSIS — N52.9 ERECTILE DYSFUNCTION, UNSPECIFIED ERECTILE DYSFUNCTION TYPE: ICD-10-CM

## 2020-09-25 DIAGNOSIS — R68.82 DECREASED LIBIDO: Primary | ICD-10-CM

## 2020-09-25 DIAGNOSIS — Z13.220 LIPID SCREENING: ICD-10-CM

## 2020-09-25 DIAGNOSIS — C22.0 HCC (HEPATOCELLULAR CARCINOMA) (H): ICD-10-CM

## 2020-09-25 DIAGNOSIS — Z94.4 LIVER TRANSPLANT RECIPIENT (H): ICD-10-CM

## 2020-09-25 DIAGNOSIS — C61 PROSTATE CANCER (H): ICD-10-CM

## 2020-09-25 LAB
AFP SERPL-MCNC: <1.5 UG/L (ref 0–8)
ALBUMIN SERPL-MCNC: 3.6 G/DL (ref 3.4–5)
ALBUMIN UR-MCNC: 100 MG/DL
ALP SERPL-CCNC: 175 U/L (ref 40–150)
ALT SERPL W P-5'-P-CCNC: 17 U/L (ref 0–70)
ANION GAP SERPL CALCULATED.3IONS-SCNC: 6 MMOL/L (ref 3–14)
APPEARANCE UR: CLEAR
AST SERPL W P-5'-P-CCNC: 11 U/L (ref 0–45)
BILIRUB DIRECT SERPL-MCNC: 0.2 MG/DL (ref 0–0.2)
BILIRUB SERPL-MCNC: 0.8 MG/DL (ref 0.2–1.3)
BILIRUB UR QL STRIP: NEGATIVE
BUN SERPL-MCNC: 26 MG/DL (ref 7–30)
CALCIUM SERPL-MCNC: 8.6 MG/DL (ref 8.5–10.1)
CHLORIDE SERPL-SCNC: 104 MMOL/L (ref 94–109)
CHOLEST SERPL-MCNC: 143 MG/DL
CO2 SERPL-SCNC: 26 MMOL/L (ref 20–32)
COLOR UR AUTO: YELLOW
CREAT SERPL-MCNC: 0.98 MG/DL (ref 0.66–1.25)
CREAT UR-MCNC: 147 MG/DL
ERYTHROCYTE [DISTWIDTH] IN BLOOD BY AUTOMATED COUNT: 12.3 % (ref 10–15)
GFR SERPL CREATININE-BSD FRML MDRD: 79 ML/MIN/{1.73_M2}
GLUCOSE SERPL-MCNC: 320 MG/DL (ref 70–99)
GLUCOSE UR STRIP-MCNC: >499 MG/DL
HCT VFR BLD AUTO: 42.5 % (ref 40–53)
HDLC SERPL-MCNC: 38 MG/DL
HGB BLD-MCNC: 14.1 G/DL (ref 13.3–17.7)
HGB UR QL STRIP: NEGATIVE
KETONES UR STRIP-MCNC: NEGATIVE MG/DL
LDLC SERPL CALC-MCNC: 69 MG/DL
LEUKOCYTE ESTERASE UR QL STRIP: NEGATIVE
MAGNESIUM SERPL-MCNC: 2.2 MG/DL (ref 1.6–2.3)
MCH RBC QN AUTO: 28.3 PG (ref 26.5–33)
MCHC RBC AUTO-ENTMCNC: 33.2 G/DL (ref 31.5–36.5)
MCV RBC AUTO: 85 FL (ref 78–100)
MICROALBUMIN UR-MCNC: 764 MG/L
MICROALBUMIN/CREAT UR: 519.73 MG/G CR (ref 0–17)
MUCOUS THREADS #/AREA URNS LPF: PRESENT /LPF
NITRATE UR QL: NEGATIVE
NONHDLC SERPL-MCNC: 106 MG/DL
PH UR STRIP: 5 PH (ref 5–7)
PLATELET # BLD AUTO: 235 10E9/L (ref 150–450)
POTASSIUM SERPL-SCNC: 4.6 MMOL/L (ref 3.4–5.3)
PROT SERPL-MCNC: 7.5 G/DL (ref 6.8–8.8)
PROT UR-MCNC: 1.07 G/L
PROT/CREAT 24H UR: 0.73 G/G CR (ref 0–0.2)
PSA SERPL-MCNC: <0.01 UG/L (ref 0–4)
RBC # BLD AUTO: 4.98 10E12/L (ref 4.4–5.9)
RBC #/AREA URNS AUTO: 1 /HPF (ref 0–2)
SODIUM SERPL-SCNC: 135 MMOL/L (ref 133–144)
SOURCE: ABNORMAL
SP GR UR STRIP: 1.02 (ref 1–1.03)
SQUAMOUS #/AREA URNS AUTO: <1 /HPF (ref 0–1)
T4 FREE SERPL-MCNC: 1.08 NG/DL (ref 0.76–1.46)
TACROLIMUS BLD-MCNC: 7.2 UG/L (ref 5–15)
TME LAST DOSE: NORMAL H
TRIGL SERPL-MCNC: 182 MG/DL
TSH SERPL DL<=0.005 MIU/L-ACNC: 8.29 MU/L (ref 0.4–4)
UROBILINOGEN UR STRIP-MCNC: 0 MG/DL (ref 0–2)
WBC # BLD AUTO: 5 10E9/L (ref 4–11)
WBC #/AREA URNS AUTO: 1 /HPF (ref 0–5)

## 2020-09-25 RX ORDER — SILDENAFIL 100 MG/1
TABLET, FILM COATED ORAL
Qty: 10 TABLET | Refills: 3 | Status: SHIPPED | OUTPATIENT
Start: 2020-09-25 | End: 2022-03-02

## 2020-09-25 RX ORDER — POLYETHYLENE GLYCOL 3350 17 G/17G
1 POWDER, FOR SOLUTION ORAL DAILY
Qty: 507 G | Refills: 3 | Status: SHIPPED | OUTPATIENT
Start: 2020-09-25 | End: 2022-03-02

## 2020-09-25 RX ORDER — TACROLIMUS 1 MG/1
2 CAPSULE ORAL EVERY 12 HOURS
Qty: 120 CAPSULE | Refills: 11 | Status: SHIPPED | OUTPATIENT
Start: 2020-09-25 | End: 2021-09-10 | Stop reason: ALTCHOICE

## 2020-09-25 ASSESSMENT — PAIN SCALES - GENERAL: PAINLEVEL: NO PAIN (0)

## 2020-09-25 NOTE — TELEPHONE ENCOUNTER
----- Message from Jaswinder Anne MD sent at 9/25/2020 11:03 AM CDT -----  Thanks  Faustina can you add as future, can be done when gets routine labs for transplant in futureee  ----- Message -----  From: Nenita Alcazar  Sent: 9/25/2020  10:20 AM CDT  To: Jaswinder Anne MD    Hel,    We can not add testosterone Total test to pervious blood  from today. We do not have the proper tube for it. Patient will need to come back for a redraw.    Thanks    -Nenita

## 2020-09-25 NOTE — NURSING NOTE
Chief Complaint   Patient presents with     Abdominal Pain     Pt reports stomach bloating/swelling; he feels it swells more in the afternoons      MINERVA Brizuela at 8:39 AM sign on 9/25/2020

## 2020-09-25 NOTE — TELEPHONE ENCOUNTER
Upon further review, lab order was already placed. Disregard  encounter. Lala Beaulieu LPN 9/25/2020 11:42 AM

## 2020-09-25 NOTE — LETTER
Patient:  Loy Limon  :   1955  MRN:     [id number]        Mr. Loy Limon  [address]   [facility] [address]        2020    Dear Mr. Limon,    Thank you for choosing the [facility] for your healthcare needs.  We appreciate the opportunity to serve you.    The following are your recent test results.     Normal testosterone level     Resulted Orders   Testosterone total   Result Value Ref Range    Testosterone Total 319 240 - 950 ng/dL      Comment:      This test was developed and its performance characteristics determined by the   [facility].   It has not been cleared or approved by the FDA. The laboratory is regulated   under CLIA as qualified to perform high-complexity testing. This test is used   for clinical purposes. It should not be regarded as investigational or for   research.         ***    Please contact your provider if you have any questions or concerns.  We look forward to serving your needs in the future.      Sincerely,        [facility] Staff

## 2020-09-25 NOTE — TELEPHONE ENCOUNTER
ISSUE:   Tacrolimus IR level 7.2 on 9/24, goal 3-5, dose 3 mg BID.    PLAN:   Please call patient and confirm this was an accurate 12-hour trough. Verify Tacrolimus IR dose.  Confirm no new medications or illness. Confirm no missed doses. If accurate trough and accurate dose, decrease Tacrolimus IR dose to 2 mg BID and repeat labs in 2 months .  Jeny Szymanski RN    OUTCOME:   Spoke with patient through  #[id number],  they confirm accurate trough level and current dose 3 mg BID. Patient confirmed dose change to 2 mg BID and to repeat labs in 2 months. Orders sent to preferred pharmacy for dose change and lab for repeat labs. Patient voiced understanding of plan.    Lucille Leone LPN

## 2020-09-25 NOTE — TELEPHONE ENCOUNTER
Call to Claribel when I noted glucosuria.  Call to Claribel w/ the assistance of a .  No answer, LM suggesting he talk w/ PCP to get better control of his glucoses.

## 2020-09-25 NOTE — LETTER
Patient:  Loy Limon  :   1955  MRN:     [id number]        Mr. Loy Limon  [address]   [place] [address]        2020    Dear Mr. Limon,    Thank you for choosing the [facility] for your healthcare needs.  We appreciate the opportunity to serve you.    The following are your recent test results.     Normal testosterone level     Resulted Orders   Testosterone total   Result Value Ref Range    Testosterone Total 319 240 - 950 ng/dL      Comment:      This test was developed and its performance characteristics determined by the   [facility].   It has not been cleared or approved by the FDA. The laboratory is regulated   under CLIA as qualified to perform high-complexity testing. This test is used   for clinical purposes. It should not be regarded as investigational or for   research.           Please contact your provider if you have any questions or concerns.  We look forward to serving your needs in the future.

## 2020-09-25 NOTE — PROGRESS NOTES
"  [facility]         HPI:       Loy Limon is a 65 year old male who presents to clinic for: Abdominal Pain (Pt reports stomach bloating/swelling; he feels it swells more in the afternoons )  Claribel Limon is a 65 English-speaking man with PMH hepatocellular carcinoma (dx 03/2018 iso alcohol-related liver cirrhosis, with microwave ablation bridge therapy to DDLT 12/2018, on tacrolimus), and with prostate cancer 04/2019 s/p RALP 01/2020 (final pathology Rebecca 3+4=7 with negative margins, [id number], PSA 07/03/2020 and 09/25/2020 < 0.01), who presents to the clinic today w complaints of \"stomach bloating\" and requesting a new glucometer.  Related to his abdominal symptoms, Loy says they started following his RALP. He says he has a sensation of \"swelling\" in his mid abdomen bilaterally (interview conducted in part via ), that is eased with exercise, e.g. walking, and seem to be worse at night after eating when he does not take a walk. He denies any decrease in appetite, and further denies nausea/vomiting (including hematemesis), melena/hematochezia, diarrhea, changes in stool color. He denies pruritus. He also denies coughing, fevers, headaches.  He endorses some constipation. In the past, he says, he was given some \"powder\" which helped his bowel movements; he ran out of it and would like some more.   Otherwise, Claribel complains of decreased sexual desire and activity, which is causing some stress in his marriage. He was prescribed Viagra in the past but was informed that his insurance would not pay for it, and the out of pocket cost was too prohibitive at the time, he says. He endorses peeing q40-60 min since his surgery.  Finally, Claribel says his home glucometer is not functional. He has needed one since his transplant surgery, with a HgbA1c 5.8 pre transplant --> 8.6 post transplant).      PMHx:  Past Medical History:   Diagnosis Date     Anemia      Biliary stricture of transplanted liver " "(H) 2018     BPH (benign prostatic hyperplasia)      Cancer (H)     hepatocellualr carcinoma     Cirrhosis of liver (H) 2018     Diabetes (H)      Enlarged prostate      Hypothyroidism      Inguinal hernia     Repaired with mesh on 18     Liver lesion 2018     Liver transplanted (H) 2018     donor liver transplant     Portal vein thrombosis     on path explant     Postoperative atrial fibrillation (H) 2018     Prostate cancer (H)      TB lung, latent     Treated        PSHx:  Past Surgical History:   Procedure Laterality Date     APPENDECTOMY       COLONOSCOPY           DAVINCI PROSTATECTOMY N/A 1/3/2020    Procedure: Robot-assisted laparoscopic radical prostatectomy, Bladder biopsy;  Surgeon: Kenrick Casiano MD;  Location: [facility]     ENDOSCOPIC RETROGRADE CHOLANGIOPANCREATOGRAM N/A 2018    Procedure: ENDOSCOPIC RETROGRADE CHOLANGIOPANCREATOGRAM, with Billary Sphincterotomy and Billary Stent Placement;  Surgeon: Omero Lawler MD;  Location: [facility]     ENDOSCOPIC RETROGRADE CHOLANGIOPANCREATOGRAM  2019    Procedure: COMBINED ENDOSCOPIC RETROGRADE CHOLANGIOPANCREATOGRAPHY, Bile Duct Stent Exchange;  Surgeon: Omero Lawler MD;  Location: [facility]     ENDOSCOPIC RETROGRADE CHOLANGIOPANCREATOGRAM N/A 2019    Procedure: ENDOSCOPIC RETROGRADE CHOLANGIOPANCREATOGRAPHY, WITH BILIARY STENT REMOVAL AND STONE EXTRACTION;  Surgeon: Omero Lawler MD;  Location: [facility]     HERNIORRHAPHY INGUINAL  2018    Procedure: Herniorrhaphy inguinal;  Surgeon: Emil Echevarria MD;  Location: [facility]     IR LIVER BIOPSY PERCUTANEOUS  2018     LAPAROTOMY EXPLORATORY      per the patient \"for infection in the LLQ\"      TRANSPLANT LIVER RECIPIENT  DONOR N/A 2018    Procedure: TRANSPLANT LIVER RECIPIENT  DONOR;  Surgeon: Emil Echevarria MD;  Location: [facility]       FamHx:  Family History   Problem Relation Age of " Onset     Memory loss Mother      Liver Disease Brother      Skin Cancer No family hx of      Melanoma No family hx of        Allergies:   No Known Allergies    Meds:    Current Outpatient Medications:      amLODIPine (NORVASC) 10 MG tablet, Take 1 tablet (10 mg) by mouth daily, Disp: 90 tablet, Rfl: 3     aspirin (ASA) 325 MG EC tablet, Take 1 tablet (325 mg) by mouth daily, Disp: 90 tablet, Rfl: 3     bisacodyl (DULCOLAX) 10 MG suppository, Place 1 suppository (10 mg) rectally daily For constipation. Stop if diarrhea occurs., Disp: 10 suppository, Rfl: 1     blood glucose (NO BRAND SPECIFIED) lancets standard, Use to test blood sugar 4 times daily or as directed., Disp: 200 each, Rfl: 11     blood glucose (ONETOUCH VERIO IQ) test strip, Use to test blood sugar 4 times daily or as directed., Disp: 200 strip, Rfl: 11     blood glucose monitoring (NO BRAND SPECIFIED) meter device kit, Use to test blood sugar 2 times daily or as directed., Disp: 1 kit, Rfl: 0     glucose 40 % (400 mg/mL) gel, Take 15-30 g by mouth every 15 minutes as needed for low blood sugar, Disp: 30 g, Rfl: 3     Lancets (ONETOUCH DELICA PLUS [id number]) MISC, U TO TEST QID OR UTD, Disp: , Rfl:      levothyroxine (SYNTHROID/LEVOTHROID) 50 MCG tablet, Take 1 tablet (50 mcg) by mouth daily, Disp: 90 tablet, Rfl: 1     magnesium hydroxide (MILK OF MAGNESIA) 400 MG/5ML suspension, Take 30 mLs by mouth daily as needed for constipation or heartburn (Stop if diarrhea occurs), Disp: 355 mL, Rfl: 1     metFORMIN (GLUCOPHAGE-XR) 500 MG 24 hr tablet, Take 2 tablets (1,000 mg) by mouth 2 times daily (with meals), Disp: 360 tablet, Rfl: 2     Metoprolol Tartrate 75 MG TABS, Take 75 mg by mouth 2 times daily, Disp: 180 tablet, Rfl: 2     mineral oil liquid, Take 30 mLs by mouth daily For constipation. Stop if diarrhea occurs., Disp: 500 mL, Rfl: 1     Multiple Vitamin (DAILY-STEVE) TABS, TK 1 T PO D., Disp: , Rfl:      polyethylene glycol (MIRALAX) 17 GM/Dose  powder, Take 17 g (1 capful) by mouth daily, Disp: 507 g, Rfl: 3     polyethylene glycol (MIRALAX) packet, Take 17 g by mouth daily, Disp: 30 packet, Rfl: 3     polyethylene glycol (MIRALAX) powder, MIX 17 GRAMS AND DRINK D, Disp: , Rfl:      Sharps Container MISC, 1 each daily, Disp: 1 each, Rfl: 2     sildenafil (REVATIO) 20 MG tablet, Take 3-5 tablets ( mg) by mouth daily as needed (1/2 hour prior to sexual activity), Disp: 60 tablet, Rfl: 3     sildenafil (VIAGRA) 100 MG tablet, Take 1/2 to 1 full pill by mouth 1/2 hour before sexual activity, Disp: 10 tablet, Rfl: 3     sitagliptin (JANUVIA) 25 MG tablet, Take 1 tablet (25 mg) by mouth daily, Disp: 90 tablet, Rfl: 1     tacrolimus (GENERIC EQUIVALENT) 1 MG capsule, Take 3 capsules (3 mg) by mouth every 12 hours, Disp: 180 capsule, Rfl: 11     ursodiol (ACTIGALL) 250 MG tablet, Take 1 tablet (250 mg) by mouth 2 times daily, Disp: 180 tablet, Rfl: 3    SocHx:  Social History     Socioeconomic History     Marital status:      Spouse name: None     Number of children: None     Years of education: None     Highest education level: None   Occupational History     None   Social Needs     Financial resource strain: None     Food insecurity     Worry: None     Inability: None     Transportation needs     Medical: None     Non-medical: None   Tobacco Use     Smoking status: Former Smoker     Packs/day: 0.03     Years: 20.00     Pack years: 0.60     Types: Cigarettes, Cigars     Start date: 1970     Last attempt to quit: 3/14/2018     Years since quittin.5     Smokeless tobacco: Never Used     Tobacco comment: Quit smoking 2018, one cigarette after dinner   Substance and Sexual Activity     Alcohol use: No     Frequency: Never     Comment: Quit drinking 2018     Drug use: No     Sexual activity: None   Lifestyle     Physical activity     Days per week: None     Minutes per session: None     Stress: None   Relationships     Social connections      Talks on phone: None     Gets together: None     Attends Spiritism service: None     Active member of club or organization: None     Attends meetings of clubs or organizations: None     Relationship status: None     Intimate partner violence     Fear of current or ex partner: None     Emotionally abused: None     Physically abused: None     Forced sexual activity: None   Other Topics Concern     Parent/sibling w/ CABG, MI or angioplasty before 65F 55M? Not Asked   Social History Narrative    Born in [place], came to [place] years ago.     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cardboard, trimming meats       Problem, Medication and Allergy Lists were reviewed and are current.  Patient is an established patient of this clinic.         Review of Systems:     ROS  I have personally reviewed and updated the complete ROS on the day of the visit.           Physical Exam:   /69 (BP Location: Right arm, Patient Position: Sitting, Cuff Size: Adult Regular)   Pulse 61   Wt 84.6 kg (186 lb 9.6 oz)   SpO2 97%   BMI 29.23 kg/m    Body mass index is 29.23 kg/m .  Vitals were reviewed        GENERAL APPEARANCE: healthy, alert and no distress     EYES: EOMI,  PERRL. No icterus.     HENT: ear canals and TM's normal and nose and mouth without ulcers or lesions     NECK: no adenopathy, no asymmetry, masses, or scars and thyroid normal to palpation     RESP: lungs clear to auscultation - no rales, rhonchi or wheezes     CV: regular rates and rhythm, normal S1 S2, no S3 or S4 and no murmur, click or rub     ABDOMEN:  soft, nontender, no HSM or masses and bowel sounds normal. Scar (bilateral subcostal with midline extension) from liver transplantation surgery, well healed.     MS: extremities normal- no gross deformities noted, no evidence of inflammation in joints, FROM in all extremities.     SKIN: no suspicious lesions or rashes. No icterus.     NEURO: Normal strength and tone, sensory exam grossly normal, mentation intact and  speech normal     PSYCH: mentation appears normal. and affect normal/bright     LYMPHATICS: No cervical adenopathy        Results:     Orders Only on 09/25/2020   Component Date Value Ref Range Status     PSA 09/25/2020 <0.01  0 - 4 ug/L Final    Assay Method:  Chemiluminescence using Siemens Vista analyzer     T4 Free 09/25/2020 1.08  0.76 - 1.46 ng/dL Final     TSH 09/25/2020 8.29* 0.40 - 4.00 mU/L Final     Sodium 09/25/2020 135  133 - 144 mmol/L Final     Potassium 09/25/2020 4.6  3.4 - 5.3 mmol/L Final     Chloride 09/25/2020 104  94 - 109 mmol/L Final     Carbon Dioxide 09/25/2020 26  20 - 32 mmol/L Final     Anion Gap 09/25/2020 6  3 - 14 mmol/L Final     Glucose 09/25/2020 320* 70 - 99 mg/dL Final     Urea Nitrogen 09/25/2020 26  7 - 30 mg/dL Final     Creatinine 09/25/2020 0.98  0.66 - 1.25 mg/dL Final     GFR Estimate 09/25/2020 79  >60 mL/min/[1.73_m2] Final    Comment: Non  GFR Calc  Starting 12/18/2018, serum creatinine based estimated GFR (eGFR) will be   calculated using the Chronic Kidney Disease Epidemiology Collaboration   (CKD-EPI) equation.       GFR Estimate If Black 09/25/2020 >90  >60 mL/min/[1.73_m2] Final    Comment:  GFR Calc  Starting 12/18/2018, serum creatinine based estimated GFR (eGFR) will be   calculated using the Chronic Kidney Disease Epidemiology Collaboration   (CKD-EPI) equation.       Calcium 09/25/2020 8.6  8.5 - 10.1 mg/dL Final     WBC 09/25/2020 5.0  4.0 - 11.0 10e9/L Final     RBC Count 09/25/2020 4.98  4.4 - 5.9 10e12/L Final     Hemoglobin 09/25/2020 14.1  13.3 - 17.7 g/dL Final     Hematocrit 09/25/2020 42.5  40.0 - 53.0 % Final     MCV 09/25/2020 85  78 - 100 fl Final     MCH 09/25/2020 28.3  26.5 - 33.0 pg Final     MCHC 09/25/2020 33.2  31.5 - 36.5 g/dL Final     RDW 09/25/2020 12.3  10.0 - 15.0 % Final     Platelet Count 09/25/2020 235  150 - 450 10e9/L Final     Bilirubin Direct 09/25/2020 0.2  0.0 - 0.2 mg/dL Final     Bilirubin  Total 09/25/2020 0.8  0.2 - 1.3 mg/dL Final     Albumin 09/25/2020 3.6  3.4 - 5.0 g/dL Final     Protein Total 09/25/2020 7.5  6.8 - 8.8 g/dL Final     Alkaline Phosphatase 09/25/2020 175* 40 - 150 U/L Final     ALT 09/25/2020 17  0 - 70 U/L Final     AST 09/25/2020 11  0 - 45 U/L Final     Magnesium 09/25/2020 2.2  1.6 - 2.3 mg/dL Final     Cholesterol 09/25/2020 143  <200 mg/dL Final     Triglycerides 09/25/2020 182* <150 mg/dL Final    Comment: Borderline high:  150-199 mg/dl  High:             200-499 mg/dl  Very high:       >499 mg/dl       HDL Cholesterol 09/25/2020 38* >39 mg/dL Final     LDL Cholesterol Calculated 09/25/2020 69  <100 mg/dL Final    Desirable:       <100 mg/dl     Non HDL Cholesterol 09/25/2020 106  <130 mg/dL Final     Protein Random Urine 09/25/2020 1.07  g/L Final     Protein Total Urine g/gr Creatinine 09/25/2020 0.73* 0 - 0.2 g/g Cr Final     Color Urine 09/25/2020 Yellow   Final     Appearance Urine 09/25/2020 Clear   Final     Glucose Urine 09/25/2020 >499* NEG^Negative mg/dL Final     Bilirubin Urine 09/25/2020 Negative  NEG^Negative Final     Ketones Urine 09/25/2020 Negative  NEG^Negative mg/dL Final     Specific Gravity Urine 09/25/2020 1.017  1.003 - 1.035 Final     Blood Urine 09/25/2020 Negative  NEG^Negative Final     pH Urine 09/25/2020 5.0  5.0 - 7.0 pH Final     Protein Albumin Urine 09/25/2020 100* NEG^Negative mg/dL Final     Urobilinogen mg/dL 09/25/2020 0.0  0.0 - 2.0 mg/dL Final     Nitrite Urine 09/25/2020 Negative  NEG^Negative Final     Leukocyte Esterase Urine 09/25/2020 Negative  NEG^Negative Final     Source 09/25/2020 Midstream Urine   Final     WBC Urine 09/25/2020 1  0 - 5 /HPF Final     RBC Urine 09/25/2020 1  0 - 2 /HPF Final     Squamous Epithelial /HPF Urine 09/25/2020 <1  0 - 1 /HPF Final     Mucous Urine 09/25/2020 Present* NEG^Negative /LPF Final     Creatinine Urine 09/25/2020 147  mg/dL Final     Albumin Urine mg/L 09/25/2020 764  mg/L Final      Albumin Urine mg/g Cr 09/25/2020 519.73* 0 - 17 mg/g Cr Final       Lab Results   Component Value Date    WBC 5.0 09/25/2020    WBC 4.3 07/03/2020    WBC 5.1 01/05/2020    HGB 14.1 09/25/2020    HGB 13.6 07/03/2020    HGB 11.6 (L) 01/05/2020    HCT 42.5 09/25/2020    HCT 40.0 07/03/2020    HCT 35.5 (L) 01/05/2020     09/25/2020     07/03/2020     01/05/2020     09/25/2020     07/03/2020     01/05/2020    POTASSIUM 4.6 09/25/2020    POTASSIUM 5.2 07/03/2020    POTASSIUM 4.5 01/05/2020    CHLORIDE 104 09/25/2020    CHLORIDE 106 07/03/2020    CHLORIDE 110 (H) 01/05/2020    CO2 26 09/25/2020    CO2 26 07/03/2020    CO2 27 01/05/2020    BUN 26 09/25/2020    BUN 19 07/03/2020    BUN 16 01/05/2020    CR 0.98 09/25/2020    CR 0.89 07/03/2020    CR 0.71 03/11/2020     (H) 09/25/2020     (H) 07/03/2020     (H) 01/05/2020    NTBNP 649 (H) 08/15/2018    TROPI 0.074 (H) 12/25/2018    TROPI 0.113 (H) 12/25/2018    AST 11 09/25/2020    AST 10 07/03/2020    AST 9 12/18/2019    ALT 17 09/25/2020    ALT 19 07/03/2020    ALT 23 12/18/2019    ALKPHOS 175 (H) 09/25/2020    ALKPHOS 186 (H) 07/03/2020    ALKPHOS 178 (H) 12/18/2019    BILITOTAL 0.8 09/25/2020    BILITOTAL 0.8 07/03/2020    BILITOTAL 0.8 12/18/2019    INR 1.14 12/31/2018    INR 1.36 (H) 12/23/2018    INR 1.61 (H) 12/23/2018       Assessment and Plan     Loy was seen today for abdominal pain.    Diagnoses and all orders for this visit:    Need for influenza vaccination  -     FLU VACCINE HIGH DOSE PRESERVATIVE FREE, AGE =>65 YR  -     FLU VACCINE HIGH DOSE PRESERVATIVE FREE, AGE =>65 YR    Constipation, unspecified constipation type  -     polyethylene glycol (MIRALAX) 17 GM/Dose powder; Take 17 g (1 capful) by mouth daily    DM type 2, goal HbA1c 7%-8% (H)  -     blood glucose monitoring (NO BRAND SPECIFIED) meter device kit; Use to test blood sugar 2 times daily or as directed.    Erectile dysfunction,  unspecified erectile dysfunction type  -     sildenafil (VIAGRA) 100 MG tablet; Take 1/2 to 1 full pill by mouth 1/2 hour before sexual activity    Decreased libido  -     Testosterone total; Future  -     Testosterone total    Loy Limon is a 65 year old man s/p DDLT 12/2018 on tacrolimus (2/2 EtOH-related liver cirrhosis and HCC), prostate cancer s/p RALP 01/2020 with PSA 07/03/2020 and 09/25/2020 <0.01, here with concerns related to constipation, erectile dysfunction/decreased libido, type II diabetes mellitus, and influenza vaccination.    1. Constipation  MRI 09/24/2020 showed no suspicious liver lesions, no ascites.  - polyethylene glycol    2. Erectile dysfunction/decreased libido  - sildenafil 100 mg; take 1/2 to 1 tablet PO 30 min before sexual activity  - testosterone level  - instructions to address with urology for additional potential interventions    3. T2DM  - home glucometer ordered    4. Healthcare maintenance  - Need for influenza vaccination; influenza vaccine administered      Patient advised to return to clinic in November 2020 for follow up.    Options for treatment and follow-up care were reviewed with the patient. Loy Limon engaged in the decision making process and verbalized understanding of the options discussed and agreed with the final plan.    Rolly Hdez MD PhD  Sep 25, 2020    Pt was seen and plan of care discussed with Dr Anne.

## 2020-09-25 NOTE — LETTER
"9/25/2020      RE: Loy Limon  [address]  [facility] [address]         [facility]         HPI:       Loy Limon is a 65 year old male who presents to clinic for: Abdominal Pain (Pt reports stomach bloating/swelling; he feels it swells more in the afternoons )  Claribel Limon is a 65 Telugu-speaking man with PMH hepatocellular carcinoma (dx 03/2018 iso alcohol-related liver cirrhosis, with microwave ablation bridge therapy to DDLT 12/2018, on tacrolimus), and with prostate cancer 04/2019 s/p RALP 01/2020 (final pathology Rebecca 3+4=7 with negative margins, [id number], PSA 07/03/2020 and 09/25/2020 < 0.01), who presents to the clinic today w complaints of \"stomach bloating\" and requesting a new glucometer.  Related to his abdominal symptoms, Loy says they started following his RALP. He says he has a sensation of \"swelling\" in his mid abdomen bilaterally (interview conducted in part via ), that is eased with exercise, e.g. walking, and seem to be worse at night after eating when he does not take a walk. He denies any decrease in appetite, and further denies nausea/vomiting (including hematemesis), melena/hematochezia, diarrhea, changes in stool color. He denies pruritus. He also denies coughing, fevers, headaches.  He endorses some constipation. In the past, he says, he was given some \"powder\" which helped his bowel movements; he ran out of it and would like some more.   Otherwise, Claribel complains of decreased sexual desire and activity, which is causing some stress in his marriage. He was prescribed Viagra in the past but was informed that his insurance would not pay for it, and the out of pocket cost was too prohibitive at the time, he says. He endorses peeing q40-60 min since his surgery.  Finally, Claribel says his home glucometer is not functional. He has needed one since his transplant surgery, with a HgbA1c 5.8 pre transplant --> 8.6 post " "transplant).      PMHx:  Past Medical History:   Diagnosis Date     Anemia      Biliary stricture of transplanted liver (H) 2018     BPH (benign prostatic hyperplasia)      Cancer (H)     hepatocellualr carcinoma     Cirrhosis of liver (H) 2018     Diabetes (H)      Enlarged prostate      Hypothyroidism      Inguinal hernia     Repaired with mesh on 18     Liver lesion 2018     Liver transplanted (H) 2018     donor liver transplant     Portal vein thrombosis     on path explant     Postoperative atrial fibrillation (H) 2018     Prostate cancer (H)      TB lung, latent     Treated        PSHx:  Past Surgical History:   Procedure Laterality Date     APPENDECTOMY       COLONOSCOPY           DAVINCI PROSTATECTOMY N/A 1/3/2020    Procedure: Robot-assisted laparoscopic radical prostatectomy, Bladder biopsy;  Surgeon: Kenrick Casiano MD;  Location: [facility]     ENDOSCOPIC RETROGRADE CHOLANGIOPANCREATOGRAM N/A 2018    Procedure: ENDOSCOPIC RETROGRADE CHOLANGIOPANCREATOGRAM, with Billary Sphincterotomy and Billary Stent Placement;  Surgeon: Omero Lawler MD;  Location: [facility] OR     ENDOSCOPIC RETROGRADE CHOLANGIOPANCREATOGRAM  2019    Procedure: COMBINED ENDOSCOPIC RETROGRADE CHOLANGIOPANCREATOGRAPHY, Bile Duct Stent Exchange;  Surgeon: Omero Lawler MD;  Location: [facility] OR     ENDOSCOPIC RETROGRADE CHOLANGIOPANCREATOGRAM N/A 2019    Procedure: ENDOSCOPIC RETROGRADE CHOLANGIOPANCREATOGRAPHY, WITH BILIARY STENT REMOVAL AND STONE EXTRACTION;  Surgeon: Omero Lawler MD;  Location: [facility] OR     HERNIORRHAPHY INGUINAL  2018    Procedure: Herniorrhaphy inguinal;  Surgeon: Emil Echevarria MD;  Location: [facility] OR     IR LIVER BIOPSY PERCUTANEOUS  2018     LAPAROTOMY EXPLORATORY      per the patient \"for infection in the LLQ\"      TRANSPLANT LIVER RECIPIENT  DONOR N/A 2018    Procedure: TRANSPLANT LIVER RECIPIENT "  DONOR;  Surgeon: Emil Echevarria MD;  Location:  OR       Critical access hospitalx:  Family History   Problem Relation Age of Onset     Memory loss Mother      Liver Disease Brother      Skin Cancer No family hx of      Melanoma No family hx of        Allergies:   No Known Allergies    Meds:    Current Outpatient Medications:      amLODIPine (NORVASC) 10 MG tablet, Take 1 tablet (10 mg) by mouth daily, Disp: 90 tablet, Rfl: 3     aspirin (ASA) 325 MG EC tablet, Take 1 tablet (325 mg) by mouth daily, Disp: 90 tablet, Rfl: 3     bisacodyl (DULCOLAX) 10 MG suppository, Place 1 suppository (10 mg) rectally daily For constipation. Stop if diarrhea occurs., Disp: 10 suppository, Rfl: 1     blood glucose (NO BRAND SPECIFIED) lancets standard, Use to test blood sugar 4 times daily or as directed., Disp: 200 each, Rfl: 11     blood glucose (ONETOUCH VERIO IQ) test strip, Use to test blood sugar 4 times daily or as directed., Disp: 200 strip, Rfl: 11     blood glucose monitoring (NO BRAND SPECIFIED) meter device kit, Use to test blood sugar 2 times daily or as directed., Disp: 1 kit, Rfl: 0     glucose 40 % (400 mg/mL) gel, Take 15-30 g by mouth every 15 minutes as needed for low blood sugar, Disp: 30 g, Rfl: 3     Lancets (ONETOUCH DELICA PLUS MGXPBT41L) MISC, U TO TEST QID OR UTD, Disp: , Rfl:      levothyroxine (SYNTHROID/LEVOTHROID) 50 MCG tablet, Take 1 tablet (50 mcg) by mouth daily, Disp: 90 tablet, Rfl: 1     magnesium hydroxide (MILK OF MAGNESIA) 400 MG/5ML suspension, Take 30 mLs by mouth daily as needed for constipation or heartburn (Stop if diarrhea occurs), Disp: 355 mL, Rfl: 1     metFORMIN (GLUCOPHAGE-XR) 500 MG 24 hr tablet, Take 2 tablets (1,000 mg) by mouth 2 times daily (with meals), Disp: 360 tablet, Rfl: 2     Metoprolol Tartrate 75 MG TABS, Take 75 mg by mouth 2 times daily, Disp: 180 tablet, Rfl: 2     mineral oil liquid, Take 30 mLs by mouth daily For constipation. Stop if diarrhea occurs., Disp: 500 mL,  Rfl: 1     Multiple Vitamin (DAILY-STEVE) TABS, TK 1 T PO D., Disp: , Rfl:      polyethylene glycol (MIRALAX) 17 GM/Dose powder, Take 17 g (1 capful) by mouth daily, Disp: 507 g, Rfl: 3     polyethylene glycol (MIRALAX) packet, Take 17 g by mouth daily, Disp: 30 packet, Rfl: 3     polyethylene glycol (MIRALAX) powder, MIX 17 GRAMS AND DRINK D, Disp: , Rfl:      Sharps Container MISC, 1 each daily, Disp: 1 each, Rfl: 2     sildenafil (REVATIO) 20 MG tablet, Take 3-5 tablets ( mg) by mouth daily as needed (1/2 hour prior to sexual activity), Disp: 60 tablet, Rfl: 3     sildenafil (VIAGRA) 100 MG tablet, Take 1/2 to 1 full pill by mouth 1/2 hour before sexual activity, Disp: 10 tablet, Rfl: 3     sitagliptin (JANUVIA) 25 MG tablet, Take 1 tablet (25 mg) by mouth daily, Disp: 90 tablet, Rfl: 1     tacrolimus (GENERIC EQUIVALENT) 1 MG capsule, Take 3 capsules (3 mg) by mouth every 12 hours, Disp: 180 capsule, Rfl: 11     ursodiol (ACTIGALL) 250 MG tablet, Take 1 tablet (250 mg) by mouth 2 times daily, Disp: 180 tablet, Rfl: 3    SocHx:  Social History     Socioeconomic History     Marital status:      Spouse name: None     Number of children: None     Years of education: None     Highest education level: None   Occupational History     None   Social Needs     Financial resource strain: None     Food insecurity     Worry: None     Inability: None     Transportation needs     Medical: None     Non-medical: None   Tobacco Use     Smoking status: Former Smoker     Packs/day: 0.03     Years: 20.00     Pack years: 0.60     Types: Cigarettes, Cigars     Start date: 1970     Last attempt to quit: 3/14/2018     Years since quittin.5     Smokeless tobacco: Never Used     Tobacco comment: Quit smoking 2018, one cigarette after dinner   Substance and Sexual Activity     Alcohol use: No     Frequency: Never     Comment: Quit drinking 2018     Drug use: No     Sexual activity: None   Lifestyle     Physical  activity     Days per week: None     Minutes per session: None     Stress: None   Relationships     Social connections     Talks on phone: None     Gets together: None     Attends Mandaeism service: None     Active member of club or organization: None     Attends meetings of clubs or organizations: None     Relationship status: None     Intimate partner violence     Fear of current or ex partner: None     Emotionally abused: None     Physically abused: None     Forced sexual activity: None   Other Topics Concern     Parent/sibling w/ CABG, MI or angioplasty before 65F 55M? Not Asked   Social History Narrative    Born in [place], came to [place] 30 years ago.     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  Patient is an established patient of this clinic.         Review of Systems:     ROS  I have personally reviewed and updated the complete ROS on the day of the visit.           Physical Exam:   /69 (BP Location: Right arm, Patient Position: Sitting, Cuff Size: Adult Regular)   Pulse 61   Wt 84.6 kg (186 lb 9.6 oz)   SpO2 97%   BMI 29.23 kg/m    Body mass index is 29.23 kg/m .  Vitals were reviewed        GENERAL APPEARANCE: healthy, alert and no distress     EYES: EOMI,  PERRL. No icterus.     HENT: ear canals and TM's normal and nose and mouth without ulcers or lesions     NECK: no adenopathy, no asymmetry, masses, or scars and thyroid normal to palpation     RESP: lungs clear to auscultation - no rales, rhonchi or wheezes     CV: regular rates and rhythm, normal S1 S2, no S3 or S4 and no murmur, click or rub     ABDOMEN:  soft, nontender, no HSM or masses and bowel sounds normal. Scar (bilateral subcostal with midline extension) from liver transplantation surgery, well healed.     MS: extremities normal- no gross deformities noted, no evidence of inflammation in joints, FROM in all extremities.     SKIN: no suspicious  lesions or rashes. No icterus.     NEURO: Normal strength and tone, sensory exam grossly normal, mentation intact and speech normal     PSYCH: mentation appears normal. and affect normal/bright     LYMPHATICS: No cervical adenopathy        Results:     Orders Only on 09/25/2020   Component Date Value Ref Range Status     PSA 09/25/2020 <0.01  0 - 4 ug/L Final    Assay Method:  Chemiluminescence using Siemens Vista analyzer     T4 Free 09/25/2020 1.08  0.76 - 1.46 ng/dL Final     TSH 09/25/2020 8.29* 0.40 - 4.00 mU/L Final     Sodium 09/25/2020 135  133 - 144 mmol/L Final     Potassium 09/25/2020 4.6  3.4 - 5.3 mmol/L Final     Chloride 09/25/2020 104  94 - 109 mmol/L Final     Carbon Dioxide 09/25/2020 26  20 - 32 mmol/L Final     Anion Gap 09/25/2020 6  3 - 14 mmol/L Final     Glucose 09/25/2020 320* 70 - 99 mg/dL Final     Urea Nitrogen 09/25/2020 26  7 - 30 mg/dL Final     Creatinine 09/25/2020 0.98  0.66 - 1.25 mg/dL Final     GFR Estimate 09/25/2020 79  >60 mL/min/[1.73_m2] Final    Comment: Non  GFR Calc  Starting 12/18/2018, serum creatinine based estimated GFR (eGFR) will be   calculated using the Chronic Kidney Disease Epidemiology Collaboration   (CKD-EPI) equation.       GFR Estimate If Black 09/25/2020 >90  >60 mL/min/[1.73_m2] Final    Comment:  GFR Calc  Starting 12/18/2018, serum creatinine based estimated GFR (eGFR) will be   calculated using the Chronic Kidney Disease Epidemiology Collaboration   (CKD-EPI) equation.       Calcium 09/25/2020 8.6  8.5 - 10.1 mg/dL Final     WBC 09/25/2020 5.0  4.0 - 11.0 10e9/L Final     RBC Count 09/25/2020 4.98  4.4 - 5.9 10e12/L Final     Hemoglobin 09/25/2020 14.1  13.3 - 17.7 g/dL Final     Hematocrit 09/25/2020 42.5  40.0 - 53.0 % Final     MCV 09/25/2020 85  78 - 100 fl Final     MCH 09/25/2020 28.3  26.5 - 33.0 pg Final     MCHC 09/25/2020 33.2  31.5 - 36.5 g/dL Final     RDW 09/25/2020 12.3  10.0 - 15.0 % Final     Platelet  Count 09/25/2020 235  150 - 450 10e9/L Final     Bilirubin Direct 09/25/2020 0.2  0.0 - 0.2 mg/dL Final     Bilirubin Total 09/25/2020 0.8  0.2 - 1.3 mg/dL Final     Albumin 09/25/2020 3.6  3.4 - 5.0 g/dL Final     Protein Total 09/25/2020 7.5  6.8 - 8.8 g/dL Final     Alkaline Phosphatase 09/25/2020 175* 40 - 150 U/L Final     ALT 09/25/2020 17  0 - 70 U/L Final     AST 09/25/2020 11  0 - 45 U/L Final     Magnesium 09/25/2020 2.2  1.6 - 2.3 mg/dL Final     Cholesterol 09/25/2020 143  <200 mg/dL Final     Triglycerides 09/25/2020 182* <150 mg/dL Final    Comment: Borderline high:  150-199 mg/dl  High:             200-499 mg/dl  Very high:       >499 mg/dl       HDL Cholesterol 09/25/2020 38* >39 mg/dL Final     LDL Cholesterol Calculated 09/25/2020 69  <100 mg/dL Final    Desirable:       <100 mg/dl     Non HDL Cholesterol 09/25/2020 106  <130 mg/dL Final     Protein Random Urine 09/25/2020 1.07  g/L Final     Protein Total Urine g/gr Creatinine 09/25/2020 0.73* 0 - 0.2 g/g Cr Final     Color Urine 09/25/2020 Yellow   Final     Appearance Urine 09/25/2020 Clear   Final     Glucose Urine 09/25/2020 >499* NEG^Negative mg/dL Final     Bilirubin Urine 09/25/2020 Negative  NEG^Negative Final     Ketones Urine 09/25/2020 Negative  NEG^Negative mg/dL Final     Specific Gravity Urine 09/25/2020 1.017  1.003 - 1.035 Final     Blood Urine 09/25/2020 Negative  NEG^Negative Final     pH Urine 09/25/2020 5.0  5.0 - 7.0 pH Final     Protein Albumin Urine 09/25/2020 100* NEG^Negative mg/dL Final     Urobilinogen mg/dL 09/25/2020 0.0  0.0 - 2.0 mg/dL Final     Nitrite Urine 09/25/2020 Negative  NEG^Negative Final     Leukocyte Esterase Urine 09/25/2020 Negative  NEG^Negative Final     Source 09/25/2020 Midstream Urine   Final     WBC Urine 09/25/2020 1  0 - 5 /HPF Final     RBC Urine 09/25/2020 1  0 - 2 /HPF Final     Squamous Epithelial /HPF Urine 09/25/2020 <1  0 - 1 /HPF Final     Mucous Urine 09/25/2020 Present* NEG^Negative  /LPF Final     Creatinine Urine 09/25/2020 147  mg/dL Final     Albumin Urine mg/L 09/25/2020 764  mg/L Final     Albumin Urine mg/g Cr 09/25/2020 519.73* 0 - 17 mg/g Cr Final       Lab Results   Component Value Date    WBC 5.0 09/25/2020    WBC 4.3 07/03/2020    WBC 5.1 01/05/2020    HGB 14.1 09/25/2020    HGB 13.6 07/03/2020    HGB 11.6 (L) 01/05/2020    HCT 42.5 09/25/2020    HCT 40.0 07/03/2020    HCT 35.5 (L) 01/05/2020     09/25/2020     07/03/2020     01/05/2020     09/25/2020     07/03/2020     01/05/2020    POTASSIUM 4.6 09/25/2020    POTASSIUM 5.2 07/03/2020    POTASSIUM 4.5 01/05/2020    CHLORIDE 104 09/25/2020    CHLORIDE 106 07/03/2020    CHLORIDE 110 (H) 01/05/2020    CO2 26 09/25/2020    CO2 26 07/03/2020    CO2 27 01/05/2020    BUN 26 09/25/2020    BUN 19 07/03/2020    BUN 16 01/05/2020    CR 0.98 09/25/2020    CR 0.89 07/03/2020    CR 0.71 03/11/2020     (H) 09/25/2020     (H) 07/03/2020     (H) 01/05/2020    NTBNP 649 (H) 08/15/2018    TROPI 0.074 (H) 12/25/2018    TROPI 0.113 (H) 12/25/2018    AST 11 09/25/2020    AST 10 07/03/2020    AST 9 12/18/2019    ALT 17 09/25/2020    ALT 19 07/03/2020    ALT 23 12/18/2019    ALKPHOS 175 (H) 09/25/2020    ALKPHOS 186 (H) 07/03/2020    ALKPHOS 178 (H) 12/18/2019    BILITOTAL 0.8 09/25/2020    BILITOTAL 0.8 07/03/2020    BILITOTAL 0.8 12/18/2019    INR 1.14 12/31/2018    INR 1.36 (H) 12/23/2018    INR 1.61 (H) 12/23/2018       Assessment and Plan     Loy was seen today for abdominal pain.    Diagnoses and all orders for this visit:    Need for influenza vaccination  -     FLU VACCINE HIGH DOSE PRESERVATIVE FREE, AGE =>65 YR  -     FLU VACCINE HIGH DOSE PRESERVATIVE FREE, AGE =>65 YR    Constipation, unspecified constipation type  -     polyethylene glycol (MIRALAX) 17 GM/Dose powder; Take 17 g (1 capful) by mouth daily    DM type 2, goal HbA1c 7%-8% (H)  -     blood glucose monitoring (NO BRAND  SPECIFIED) meter device kit; Use to test blood sugar 2 times daily or as directed.    Erectile dysfunction, unspecified erectile dysfunction type  -     sildenafil (VIAGRA) 100 MG tablet; Take 1/2 to 1 full pill by mouth 1/2 hour before sexual activity    Decreased libido  -     Testosterone total; Future  -     Testosterone total    Loy Limon is a 65 year old man s/p DDLT 12/2018 on tacrolimus (2/2 EtOH-related liver cirrhosis and HCC), prostate cancer s/p RALP 01/2020 with PSA 07/03/2020 and 09/25/2020 <0.01, here with concerns related to constipation, erectile dysfunction/decreased libido, type II diabetes mellitus, and influenza vaccination.    1. Constipation  MRI 09/24/2020 showed no suspicious liver lesions, no ascites.  - polyethylene glycol    2. Erectile dysfunction/decreased libido  - sildenafil 100 mg; take 1/2 to 1 tablet PO 30 min before sexual activity  - testosterone level  - instructions to address with urology for additional potential interventions    3. T2DM  - home glucometer ordered    4. Healthcare maintenance  - Need for influenza vaccination; influenza vaccine administered      Patient advised to return to clinic in November 2020 for follow up.    Options for treatment and follow-up care were reviewed with the patient. Loy Limon engaged in the decision making process and verbalized understanding of the options discussed and agreed with the final plan.    Rolly Hdez MD PhD  Sep 25, 2020    Pt was seen and plan of care discussed with Dr Anne.       I saw the patient with the resident and I agree with resident note  Jaswinder Anne MD

## 2020-09-28 NOTE — TELEPHONE ENCOUNTER
RECORDS RECEIVED FROM: Internal - Proteinuria and microhematuria.   DATE RECEIVED: 10.16.2020    NOTES STATUS DETAILS   OFFICE NOTE from referring provider Internal 09.23.2020 Tamela Carballo MD   OFFICE NOTE from other specialist  Internal 09.24.2020 Multiple Providers   *Only VASCULITIS or LUPUS gather office notes for the following N/A    *PULMONARY   N/A    *ENT N/A    *DERMATOLOGY N/A    *RHEUMATOLOGY N/A    DISCHARGE SUMMARY from hospital N/A    DISCHARGE REPORT from the ER N/A    MEDICATION LIST Internal    IMAGING  (NEED IMAGES AND REPORTS)     KIDNEY CT SCAN Internal 03.04.2019 CT ABDOMEN PELVIS W CONTRAST   KIDNEY ULTRASOUND Internal 06.26.2020 US LIVER TRANSPLANT   MR ABDOMEN Internal 09.24.2020 MRI ABDOMEN    NUCLEAR MEDICINE RENAL N/A    LABS     CBC Internal 09.25.2020   CMP Internal 09.25.2020   BMP Internal 09.25.2020   UA Internal 09.25.2020   URINE PROTEIN Internal 09.25.2020   RENAL PANEL N/A    BIOPSY     KIDNEY BIOPSY  N/A

## 2020-09-29 ENCOUNTER — VIRTUAL VISIT (OUTPATIENT)
Dept: UROLOGY | Facility: CLINIC | Age: 65
End: 2020-09-29
Payer: COMMERCIAL

## 2020-09-29 DIAGNOSIS — C61 PROSTATE CANCER (H): Primary | ICD-10-CM

## 2020-09-29 LAB — TESTOST SERPL-MCNC: 319 NG/DL (ref 240–950)

## 2020-09-29 NOTE — PATIENT INSTRUCTIONS
-Go to your pharmacy to  the glucometer.  Discussed that uncontrolled blood sugars can cause sugar in the urine which can cause smelly urine and frequent urination  - For kidney back pain: Ibuprofen as directed, heating pads, stretches.  Let us know if pain worsens or doesn't go away.   - For erectile dysfunction - find a [facility] near you.  I will send your Viagra (sildenafil) prescription to that pharmacy. [facility] is selling Viagra (sildenafil) very inexpensively.   - For prostate cancer - Return in 3 months for a virtual visit with a PSA first.     [facility]

## 2020-09-29 NOTE — LETTER
"9/29/2020       RE: Loy Limon  [address]  [facility] [id number]     Dear Colleague,    Thank you for referring your patient, Loy Limon, to the [facility] UROLOGY AND INST FOR PROSTATE AND UROLOGIC CANCERS at [facility]. Please see a copy of my visit note below.    Loy Limon is a 65 year old male who is being evaluated via a billable telephone visit.      The patient has been notified of following:     \"This telephone visit will be conducted via a call between you and your physician/provider. We have found that certain health care needs can be provided without the need for a physical exam.  This service lets us provide the care you need with a short phone conversation.  If a prescription is necessary we can send it directly to your pharmacy.  If lab work is needed we can place an order for that and you can then stop by our lab to have the test done at a later time.    Telephone visits are billed at different rates depending on your insurance coverage. During this emergency period, for some insurers they may be billed the same as an in-person visit.  Please reach out to your insurance provider with any questions.    If during the course of the call the physician/provider feels a telephone visit is not appropriate, you will not be charged for this service.\"    Patient has given verbal consent for Telephone visit?  Yes    What phone number would you like to be contacted at? [phone]     How would you like to obtain your AVS? Mail a copy    HPI: Mr. Loy Limon is a 65 year old year old male presenting today for evaluation of chief complaint(s): Follow Up (Prostate cancer, LUTS)    Mr. Limon has PMH significant for HTN, DM, and immunosuppression 2/2 liver transplant.  More recently he also developed prostate cancer (4/26/19 - PSA 5.51ng/dL) and is now s/p RALP with Dr. Casiano on 1/3/20, final path: Rebecca 3+4=7, neg margins, [id number]. "       Decipher: HIGH RISK     He was last seen in Urology on 7/7/20 by me with an undetectable PSA. At that time he was having hourly voiding with urgency and urge incontinence as well as SOL with cough/ sneeze/ activity.  He was using 1 brief per day with paper inside that he changed frequently.  He was doing home PFPT exercises.  His PVR was checked and was 25cc. His UA was checked and showed glucose >499 but was otherwise negative.  Last HGB A1c was 7.2% in 1/2020. He had no spontaneous erections thus was prescribed sildenafil to try.    Today he returns in telephone consultation.  Professional  used.    Today he is feeling sad about his illnesses.  He says he is having pain on his right kidney starting today.  In the low back, right of the spine.  He has been told that he has arthritis.  It really hurts when he rolls over in bed.  Pain level is 4-5/10 and is staying about the same intensity.  Better with doing stretches for back and resting.  Nothing makes it worse.  No h/o kidney stones.    - No fevers, nightsweats  - Weight - has gained 8 lbs since COVID began.  Is trying to walk in the park and eat healthy  - Abdomen has been bloated x2-3 years, and this is followed by Transplant(Dr. Carballo)  - Gets LE edema  - Voiding symptoms:  Needs to push to generate a strong stream.  If he doesn't push,the stream is thin. Since prostatectomy, his urine is foamy and his urine smells bad. No hematuria. Has had a broken glucometer x1-2 months, so hasn't been checking CBG. Dr. Anne ordered a new glucometer, but he hasn't heard anything. Review of EMR shows RX was sent to [facility] on [address].  Still voiding every 40 minutes.  No urgency, but if he doesn't void right away will leak.  Wears a pad only when he leaves home. Caffeine: none.  - Constipation.  Waiting to get prescription from the pharmacy.      SEXUAL SXS:  No erections.  Hasn't tried PDE5Is - couldn't afford.  Before surgery, was  sexually active.      Current Outpatient Medications   Medication Sig Dispense Refill     amLODIPine (NORVASC) 10 MG tablet Take 1 tablet (10 mg) by mouth daily 90 tablet 3     aspirin (ASA) 325 MG EC tablet Take 1 tablet (325 mg) by mouth daily 90 tablet 3     bisacodyl (DULCOLAX) 10 MG suppository Place 1 suppository (10 mg) rectally daily For constipation. Stop if diarrhea occurs. 10 suppository 1     blood glucose (NO BRAND SPECIFIED) lancets standard Use to test blood sugar 4 times daily or as directed. 200 each 11     blood glucose (ONETOUCH VERIO IQ) test strip Use to test blood sugar 4 times daily or as directed. 200 strip 11     blood glucose monitoring (NO BRAND SPECIFIED) meter device kit Use to test blood sugar 2 times daily or as directed. 1 kit 0     glucose 40 % (400 mg/mL) gel Take 15-30 g by mouth every 15 minutes as needed for low blood sugar 30 g 3     Lancets (ONETOUCH DELICA PLUS [id number]) MISC U TO TEST QID OR UTD       levothyroxine (SYNTHROID/LEVOTHROID) 50 MCG tablet Take 1 tablet (50 mcg) by mouth daily 90 tablet 1     magnesium hydroxide (MILK OF MAGNESIA) 400 MG/5ML suspension Take 30 mLs by mouth daily as needed for constipation or heartburn (Stop if diarrhea occurs) 355 mL 1     metFORMIN (GLUCOPHAGE-XR) 500 MG 24 hr tablet Take 2 tablets (1,000 mg) by mouth 2 times daily (with meals) 360 tablet 2     Metoprolol Tartrate 75 MG TABS Take 75 mg by mouth 2 times daily 180 tablet 2     mineral oil liquid Take 30 mLs by mouth daily For constipation. Stop if diarrhea occurs. 500 mL 1     Multiple Vitamin (DAILY-STEVE) TABS TK 1 T PO D.       polyethylene glycol (MIRALAX) 17 GM/Dose powder Take 17 g (1 capful) by mouth daily 507 g 3     polyethylene glycol (MIRALAX) packet Take 17 g by mouth daily 30 packet 3     polyethylene glycol (MIRALAX) powder MIX 17 GRAMS AND DRINK D       Sharps Container MISC 1 each daily 1 each 2     sildenafil (REVATIO) 20 MG tablet Take 3-5 tablets ( mg) by  mouth daily as needed (1/2 hour prior to sexual activity) 60 tablet 3     sildenafil (VIAGRA) 100 MG tablet Take 1/2 to 1 full pill by mouth 1/2 hour before sexual activity 10 tablet 3     sitagliptin (JANUVIA) 25 MG tablet Take 1 tablet (25 mg) by mouth daily 90 tablet 1     tacrolimus (GENERIC EQUIVALENT) 1 MG capsule Take 2 capsules (2 mg) by mouth every 12 hours 120 capsule 11     ursodiol (ACTIGALL) 250 MG tablet Take 1 tablet (250 mg) by mouth 2 times daily 180 tablet 3       ALLERGIES: Patient has no known allergies.      REVIEW OF SYSTEMS:  As above in HPI    GENERAL PHYSICAL EXAM:   Vitals: There were no vitals taken for this visit.  There is no height or weight on file to calculate BMI.    GENERAL: Well groomed, well developed, well nourished male in NAD.  NEURO: Alert and oriented x 3.  PSYCH: Normal mood and affect, pleasant and agreeable during interview     RADIOLOGY: The following tests were reviewed:   MRI ABDOMEN 9/24/2020  CLINICAL HISTORY:  follow up HCC s/p liver transplant     TECHNIQUE:  Images were acquired with and without intravenous contrast  through the abdomen. The following MR images were acquired: TrueFISP,  multiplanar T2 weighted, axial T1 in/out of phase, axial fat-saturated  T1, diffusion-weighted. Multiplanar T1-weighted images with fat  saturation were before contrast administration and at multiple time  points following the administration of intravenous contrast. Contrast  dose: 8 cc of Gadavist injected.     FINDINGS:  Comparison study: MRI 12/27/2018     Liver: Postsurgical changes of orthotopic liver transplant. No  abnormal parenchymal signal. No focal masses identified. No  significant hepatic steatosis or iron deposition. No significant  intrahepatic biliary duct dilatation. Prominent common bile duct,  measures 10 mm.     Gallbladder: Surgically absent.       Spleen: Normal, not enlarged.     Kidneys:  Several T2 hyperintense nonenhancing simple appearing  cortical renal  cysts. No kidney stone, or masses. No pelvocaliectasis.     Adrenal glands: Normal.      Pancreas:  Fatty atrophy of the pancreas. Normal appearance and signal  characteristics of the remaining pancreatic parenchyma. No focal  masses. Pancreatic duct normal in caliber. No side branch ductal  dilatation.      Bowel: Unremarkable, with no evidence of bowel dilatation or abnormal  wall enhancement. Colonic diverticulosis without evidence of  diverticulitis.        Lymph nodes: No abdominal lymphadenopathy by size criteria.     Blood vessels: Major blood vessels are patent with no evidence of clot  or obstruction.         Lung bases: Mild bilateral lower lobes dependent atelectasis. No  abnormal T2 hyperintensity in the lung bases.     Bones and soft tissues: Degenerative changes of the lumbar spine. No  evidence of acute bony abnormality or abnormal soft tissue  enhancement.        Mesentery and abdominal wall: Postoperative changes along the anterior  abdominal wall. Fat-containing umbilical hernia.     Ascites: No free fluid is detected.                                                                     IMPRESSION:  Postoperative changes of liver transplant. No suspicious liver  lesions.    LABS: The last test results for Mr. Loy Limon were reviewed:  PSA -   Lab Results   Component Value Date    PSA <0.01 09/25/2020    PSA <0.01 07/03/2020    PSA <0.01 03/30/2020    PSA 5.51 04/26/2019    PSA 5.02 03/19/2019    PSA 5.25 03/04/2019    PSA 3.27 08/15/2018    PSA 4.32 07/19/2018     BMP -   Recent Labs   Lab Test 09/25/20  0820 07/03/20  1109 03/11/20  0911 01/05/20  0520  12/18/19  0753  11/18/19  0810 11/06/19  0711    137  --  142   < > 140   < > 139 138   POTASSIUM 4.6 5.2  --  4.5   < > 4.5   < > 4.9 4.8   CHLORIDE 104 106  --  110*   < > 110*   < > 109 108   CO2 26 26  --  27   < > 27   < > 26 26   BUN 26 19  --  16   < > 25   < > 17 21   CR 0.98 0.89 0.71 0.67   < > 0.77   < > 0.72 0.87   *  494*  --  149*   < > 166*   < > 153* 162*   YELENA 8.6 8.0*  --  7.8*   < > 8.4*   < > 8.4* 8.8   MAG 2.2 2.2  --   --   --  2.0  --  2.0 1.8   PHOS  --   --   --   --   --  2.7  --  2.4* 3.0    < > = values in this interval not displayed.       CBC -   Recent Labs   Lab Test 09/25/20  0820 07/03/20  1109 01/05/20  0520   WBC 5.0 4.3 5.1   HGB 14.1 13.6 11.6*    185 164       ASSESSMENT:   1) prostate cancer  2) urinary incontinence  3) erectile dysfunction    PLAN:   -Go to your pharmacy to  the glucometer.  Discussed that uncontrolled blood sugars can cause sugar in the urine which can cause smelly urine and frequent urination- For kidney back pain: Ibuprofen as directed, heating pads, stretches.  Let us know if pain worsens or doesn't go away.   - For erectile dysfunction - find a [facility] near you.  I will send your Viagra (sildenafil) prescription to that pharmacy. [facility] is selling Viagra (sildenafil) very inexpensively.   - For prostate cancer - Return in 3 months with a PSA first.     Phone call duration: 32 minutes    Kerri Pascual PA-C  Department of Urologic Surgery

## 2020-09-29 NOTE — PROGRESS NOTES
"Loy Limon is a 65 year old male who is being evaluated via a billable telephone visit.      The patient has been notified of following:     \"This telephone visit will be conducted via a call between you and your physician/provider. We have found that certain health care needs can be provided without the need for a physical exam.  This service lets us provide the care you need with a short phone conversation.  If a prescription is necessary we can send it directly to your pharmacy.  If lab work is needed we can place an order for that and you can then stop by our lab to have the test done at a later time.    Telephone visits are billed at different rates depending on your insurance coverage. During this emergency period, for some insurers they may be billed the same as an in-person visit.  Please reach out to your insurance provider with any questions.    If during the course of the call the physician/provider feels a telephone visit is not appropriate, you will not be charged for this service.\"    Patient has given verbal consent for Telephone visit?  Yes    What phone number would you like to be contacted at? [phone]     How would you like to obtain your AVS? Mail a copy    Phone call duration: *** minutes    {signature options:[id number]}      "

## 2020-09-29 NOTE — PROGRESS NOTES
"Loy Limon is a 65 year old male who is being evaluated via a billable telephone visit.      The patient has been notified of following:     \"This telephone visit will be conducted via a call between you and your physician/provider. We have found that certain health care needs can be provided without the need for a physical exam.  This service lets us provide the care you need with a short phone conversation.  If a prescription is necessary we can send it directly to your pharmacy.  If lab work is needed we can place an order for that and you can then stop by our lab to have the test done at a later time.    Telephone visits are billed at different rates depending on your insurance coverage. During this emergency period, for some insurers they may be billed the same as an in-person visit.  Please reach out to your insurance provider with any questions.    If during the course of the call the physician/provider feels a telephone visit is not appropriate, you will not be charged for this service.\"    Patient has given verbal consent for Telephone visit?  Yes    What phone number would you like to be contacted at? [phone]     How would you like to obtain your AVS? Mail a copy    HPI: Mr. Loy Limon is a 65 year old year old male presenting today for evaluation of chief complaint(s): Follow Up (Prostate cancer, LUTS)    Mr. Limon has PMH significant for HTN, DM, and immunosuppression 2/2 liver transplant.  More recently he also developed prostate cancer (4/26/19 - PSA 5.51ng/dL) and is now s/p RALP with Dr. Casiano on 1/3/20, final path: Rebecca 3+4=7, neg margins, [id number].       Decipher: HIGH RISK     He was last seen in Urology on 7/7/20 by me with an undetectable PSA. At that time he was having hourly voiding with urgency and urge incontinence as well as SOL with cough/ sneeze/ activity.  He was using 1 brief per day with paper inside that he changed frequently.  He was doing home PFPT exercises.  " His PVR was checked and was 25cc. His UA was checked and showed glucose >499 but was otherwise negative.  Last HGB A1c was 7.2% in 1/2020. He had no spontaneous erections thus was prescribed sildenafil to try.    Today he returns in telephone consultation.  Professional  used.    Today he is feeling sad about his illnesses.  He says he is having pain on his right kidney starting today.  In the low back, right of the spine.  He has been told that he has arthritis.  It really hurts when he rolls over in bed.  Pain level is 4-5/10 and is staying about the same intensity.  Better with doing stretches for back and resting.  Nothing makes it worse.  No h/o kidney stones.    - No fevers, nightsweats  - Weight - has gained 8 lbs since COVID began.  Is trying to walk in the park and eat healthy  - Abdomen has been bloated x2-3 years, and this is followed by Transplant(Dr. Carballo)  - Gets LE edema  - Voiding symptoms:  Needs to push to generate a strong stream.  If he doesn't push,the stream is thin. Since prostatectomy, his urine is foamy and his urine smells bad. No hematuria. Has had a broken glucometer x1-2 months, so hasn't been checking CBG. Dr. Anne ordered a new glucometer, but he hasn't heard anything. Review of EMR shows RX was sent to [facility] on [address].  Still voiding every 40 minutes.  No urgency, but if he doesn't void right away will leak.  Wears a pad only when he leaves home. Caffeine: none.  - Constipation.  Waiting to get prescription from the pharmacy.      SEXUAL SXS:  No erections.  Hasn't tried PDE5Is - couldn't afford.  Before surgery, was sexually active.      Current Outpatient Medications   Medication Sig Dispense Refill     amLODIPine (NORVASC) 10 MG tablet Take 1 tablet (10 mg) by mouth daily 90 tablet 3     aspirin (ASA) 325 MG EC tablet Take 1 tablet (325 mg) by mouth daily 90 tablet 3     bisacodyl (DULCOLAX) 10 MG suppository Place 1 suppository (10 mg)  rectally daily For constipation. Stop if diarrhea occurs. 10 suppository 1     blood glucose (NO BRAND SPECIFIED) lancets standard Use to test blood sugar 4 times daily or as directed. 200 each 11     blood glucose (ONETOUCH VERIO IQ) test strip Use to test blood sugar 4 times daily or as directed. 200 strip 11     blood glucose monitoring (NO BRAND SPECIFIED) meter device kit Use to test blood sugar 2 times daily or as directed. 1 kit 0     glucose 40 % (400 mg/mL) gel Take 15-30 g by mouth every 15 minutes as needed for low blood sugar 30 g 3     Lancets (ONETOUCH DELICA PLUS [id number]) MISC U TO TEST QID OR UTD       levothyroxine (SYNTHROID/LEVOTHROID) 50 MCG tablet Take 1 tablet (50 mcg) by mouth daily 90 tablet 1     magnesium hydroxide (MILK OF MAGNESIA) 400 MG/5ML suspension Take 30 mLs by mouth daily as needed for constipation or heartburn (Stop if diarrhea occurs) 355 mL 1     metFORMIN (GLUCOPHAGE-XR) 500 MG 24 hr tablet Take 2 tablets (1,000 mg) by mouth 2 times daily (with meals) 360 tablet 2     Metoprolol Tartrate 75 MG TABS Take 75 mg by mouth 2 times daily 180 tablet 2     mineral oil liquid Take 30 mLs by mouth daily For constipation. Stop if diarrhea occurs. 500 mL 1     Multiple Vitamin (DAILY-STEVE) TABS TK 1 T PO D.       polyethylene glycol (MIRALAX) 17 GM/Dose powder Take 17 g (1 capful) by mouth daily 507 g 3     polyethylene glycol (MIRALAX) packet Take 17 g by mouth daily 30 packet 3     polyethylene glycol (MIRALAX) powder MIX 17 GRAMS AND DRINK D       Sharps Container MISC 1 each daily 1 each 2     sildenafil (REVATIO) 20 MG tablet Take 3-5 tablets ( mg) by mouth daily as needed (1/2 hour prior to sexual activity) 60 tablet 3     sildenafil (VIAGRA) 100 MG tablet Take 1/2 to 1 full pill by mouth 1/2 hour before sexual activity 10 tablet 3     sitagliptin (JANUVIA) 25 MG tablet Take 1 tablet (25 mg) by mouth daily 90 tablet 1     tacrolimus (GENERIC EQUIVALENT) 1 MG capsule Take 2  capsules (2 mg) by mouth every 12 hours 120 capsule 11     ursodiol (ACTIGALL) 250 MG tablet Take 1 tablet (250 mg) by mouth 2 times daily 180 tablet 3       ALLERGIES: Patient has no known allergies.      REVIEW OF SYSTEMS:  As above in HPI    GENERAL PHYSICAL EXAM:   Vitals: There were no vitals taken for this visit.  There is no height or weight on file to calculate BMI.    GENERAL: Well groomed, well developed, well nourished male in NAD.  NEURO: Alert and oriented x 3.  PSYCH: Normal mood and affect, pleasant and agreeable during interview     RADIOLOGY: The following tests were reviewed:   MRI ABDOMEN 9/24/2020  CLINICAL HISTORY:  follow up HCC s/p liver transplant     TECHNIQUE:  Images were acquired with and without intravenous contrast  through the abdomen. The following MR images were acquired: TrueFISP,  multiplanar T2 weighted, axial T1 in/out of phase, axial fat-saturated  T1, diffusion-weighted. Multiplanar T1-weighted images with fat  saturation were before contrast administration and at multiple time  points following the administration of intravenous contrast. Contrast  dose: 8 cc of Gadavist injected.     FINDINGS:  Comparison study: MRI 12/27/2018     Liver: Postsurgical changes of orthotopic liver transplant. No  abnormal parenchymal signal. No focal masses identified. No  significant hepatic steatosis or iron deposition. No significant  intrahepatic biliary duct dilatation. Prominent common bile duct,  measures 10 mm.     Gallbladder: Surgically absent.       Spleen: Normal, not enlarged.     Kidneys:  Several T2 hyperintense nonenhancing simple appearing  cortical renal cysts. No kidney stone, or masses. No pelvocaliectasis.     Adrenal glands: Normal.      Pancreas:  Fatty atrophy of the pancreas. Normal appearance and signal  characteristics of the remaining pancreatic parenchyma. No focal  masses. Pancreatic duct normal in caliber. No side branch ductal  dilatation.      Bowel: Unremarkable,  with no evidence of bowel dilatation or abnormal  wall enhancement. Colonic diverticulosis without evidence of  diverticulitis.        Lymph nodes: No abdominal lymphadenopathy by size criteria.     Blood vessels: Major blood vessels are patent with no evidence of clot  or obstruction.         Lung bases: Mild bilateral lower lobes dependent atelectasis. No  abnormal T2 hyperintensity in the lung bases.     Bones and soft tissues: Degenerative changes of the lumbar spine. No  evidence of acute bony abnormality or abnormal soft tissue  enhancement.        Mesentery and abdominal wall: Postoperative changes along the anterior  abdominal wall. Fat-containing umbilical hernia.     Ascites: No free fluid is detected.                                                                     IMPRESSION:  Postoperative changes of liver transplant. No suspicious liver  lesions.    LABS: The last test results for Mr. Loy Limon were reviewed:  PSA -   Lab Results   Component Value Date    PSA <0.01 09/25/2020    PSA <0.01 07/03/2020    PSA <0.01 03/30/2020    PSA 5.51 04/26/2019    PSA 5.02 03/19/2019    PSA 5.25 03/04/2019    PSA 3.27 08/15/2018    PSA 4.32 07/19/2018     BMP -   Recent Labs   Lab Test 09/25/20  0820 07/03/20  1109 03/11/20  0911 01/05/20  0520  12/18/19  0753  11/18/19  0810 11/06/19  0711    137  --  142   < > 140   < > 139 138   POTASSIUM 4.6 5.2  --  4.5   < > 4.5   < > 4.9 4.8   CHLORIDE 104 106  --  110*   < > 110*   < > 109 108   CO2 26 26  --  27   < > 27   < > 26 26   BUN 26 19  --  16   < > 25   < > 17 21   CR 0.98 0.89 0.71 0.67   < > 0.77   < > 0.72 0.87   * 494*  --  149*   < > 166*   < > 153* 162*   YELENA 8.6 8.0*  --  7.8*   < > 8.4*   < > 8.4* 8.8   MAG 2.2 2.2  --   --   --  2.0  --  2.0 1.8   PHOS  --   --   --   --   --  2.7  --  2.4* 3.0    < > = values in this interval not displayed.       CBC -   Recent Labs   Lab Test 09/25/20  0820 07/03/20  1109 01/05/20  0520   WBC 5.0 4.3  5.1   HGB 14.1 13.6 11.6*    185 164       ASSESSMENT:   1) prostate cancer  2) urinary incontinence  3) erectile dysfunction    PLAN:   -Go to your pharmacy to  the glucometer.  Discussed that uncontrolled blood sugars can cause sugar in the urine which can cause smelly urine and frequent urination- For kidney back pain: Ibuprofen as directed, heating pads, stretches.  Let us know if pain worsens or doesn't go away.   - For erectile dysfunction - find a [facility] near you.  I will send your Viagra (sildenafil) prescription to that pharmacy. [facility] is selling Viagra (sildenafil) very inexpensively.   - For prostate cancer - Return in 3 months with a PSA first.     Phone call duration: 32 minutes    Kerri Pascual PA-C  Department of Urologic Surgery

## 2020-09-30 ENCOUNTER — APPOINTMENT (OUTPATIENT)
Dept: INTERPRETER SERVICES | Facility: CLINIC | Age: 65
End: 2020-09-30
Payer: COMMERCIAL

## 2020-09-30 ENCOUNTER — TELEPHONE (OUTPATIENT)
Dept: ENDOCRINOLOGY | Facility: CLINIC | Age: 65
End: 2020-09-30

## 2020-09-30 DIAGNOSIS — E03.8 OTHER SPECIFIED HYPOTHYROIDISM: Primary | ICD-10-CM

## 2020-09-30 RX ORDER — LEVOTHYROXINE SODIUM 75 UG/1
75 TABLET ORAL DAILY
Qty: 90 TABLET | Refills: 3 | Status: SHIPPED | OUTPATIENT
Start: 2020-09-30 | End: 2021-03-24

## 2020-09-30 NOTE — TELEPHONE ENCOUNTER
----- Message from Gilma Guthrie PA-C sent at 9/28/2020  4:12 PM CDT -----  Nikki:  Could you please see if pt has been taking this Levothyroxine 50 mcg daily. If he has, I will plan to increase his dose 75 mcg po daily.  Please check to see if he is taking the Levothyroxine on an empty stomach, not with vitamins.  Thanks burton

## 2020-09-30 NOTE — TELEPHONE ENCOUNTER
He verified tat e is taking 50 mcg daily and on empty stomach am and also no vitamins. He was notified to increase to 75 mcg daily. Script routed to  you to approve. Nikki Begum RN on 9/30/2020 at 2:49 PM

## 2020-10-01 ENCOUNTER — VIRTUAL VISIT (OUTPATIENT)
Dept: ONCOLOGY | Facility: CLINIC | Age: 65
End: 2020-10-01
Attending: INTERNAL MEDICINE
Payer: COMMERCIAL

## 2020-10-01 DIAGNOSIS — C22.0 HCC (HEPATOCELLULAR CARCINOMA) (H): Primary | ICD-10-CM

## 2020-10-01 PROCEDURE — 999N001193 HC VIDEO/TELEPHONE VISIT; NO CHARGE

## 2020-10-01 PROCEDURE — 99214 OFFICE O/P EST MOD 30 MIN: CPT | Mod: 95 | Performed by: INTERNAL MEDICINE

## 2020-10-01 NOTE — LETTER
"    10/1/2020         RE: Loy Limon  [address]  [facility] [id number]        Dear Colleague,    Thank you for referring your patient, Loy Limon, to the [facility]. Please see a copy of my visit note below.    Loy Limon is a 65 year old male who is being evaluated via a billable telephone visit.      The patient has been notified of following:     \"This telephone visit will be conducted via a call between you and your physician/provider. We have found that certain health care needs can be provided without the need for a physical exam.  This service lets us provide the care you need with a short phone conversation.  If a prescription is necessary we can send it directly to your pharmacy.  If lab work is needed we can place an order for that and you can then stop by our lab to have the test done at a later time.    Telephone visits are billed at different rates depending on your insurance coverage. During this emergency period, for some insurers they may be billed the same as an in-person visit.  Please reach out to your insurance provider with any questions.    If during the course of the call the physician/provider feels a telephone visit is not appropriate, you will not be charged for this service.\"    Patient has given verbal consent for Telephone visit?  Yes    What phone number would you like to be contacted at? [phone]    How would you like to obtain your AVS? Mail a copy    Jackelyn KINNEY    Phone call duration: 26 minutes    Hi Melgar MD        Oncology follow up visit:  Date on this visit: 10/1/2020     HCC    Primary Physician: [facility], Oncology     History Of Present Illness:      Please see previous note for details. I have copied and updated from prior note.      Mr. Limon is a 65 year old male who has been a chronic heavy alcohol drinker presented with right abdominal pain in March 2018 and workup including " a CT scan showed cirrhosis, portal hypertension and 2 hepatic lesions in the right hepatic lobe which were concerning for hepatocellular carcinoma.  He had MRI on 3/16/2018 which confirmed cirrhosis and portal hypertension and splenomegaly. 3.7 cm segment 8 lesion was OPTN 5B.  There was an antecedent 0.9 cm satellite nodule in hepatic segment 8 consistent with LI-RADS 4.  In segment 7, 1.5 cm LIRADS 4 lesion was seen  Another 1.9 cm segment 7 lesion was seen which was indeterminate.  Several other LIRADS 3 lesions were scattered.  Alpha-fetoprotein was not elevated.  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 Hepatitis B surface antibody was reactive consistent with effective vaccination. Hepatitis C antibody was nonreactive.  He has had no ascites. He has had mild hepatic and Neuropathy but was unable to tolerate lactulose because of abdominal discomfort. He has no history of variceal bleeding although he has grade 2 esophageal varices which have not been banded and he continues to be on propranolol.    He had repeat MRI done on 5/24/2018 which showed stable to slight increase in size of the segment 8 lesion.  Segment 7 lesion was consistent with LIRADS 4 lesion  Another segment 7 lesion is also consistent with LIRADS 4 lesion  CT of the chest showed a 4 mm solitary pulmonary nodule in the right lower lobe which remains indeterminate. There was no other evidence of metastasis.  Bone scan was negative for any evidence of metastatic disease.    He had chemoembolization of the S8 lesion on 6/13/18.    As there was residual tumor left, he had microwave ablation of residual mass in hepatic segment 8 on 7/2/2018.     S7 IRADS 4 lesions were not treated    Repeat MRI on 10/3/18 shows no residual viable tumor in S8 lesion  S7 LIRADS 4 lesion was less well defined.  He has some scattered LIRADS 3 lesions.    He had liver transplant in Dec 2018 and explanted liver showed S8  viable tumor with 90% necrosis.  S7 1.1 cm HCC and S5 1.4 cm HCC.  3 benign lymph nodes.  It was a ypT2N0 lesion.    He developed prostate cancer (19 - PSA 5.51ng/dL) and is now s/p RALP with Dr. Casiano on 1/3/20, final path: [place] 3+4=7, neg margins, [id number].   Last PSA on 7/3/2020- <0.01.      He saw me in Oct 2018 before the transplant and then in 2020.    Interval hx:   This is a telephone visit and professional Lao Interpretor ID # [id number] is available throughout the visit.  He is doing about the same as before. He denies abdominal pain but has bloating/swelling sensation. Notices some leg edema when he sits for prolonged periods of time. No nausea or vomiting. Has occasional constipation and medications help.   No bleeding or infections. No dyspnea.       ECOG 1    ROS:  A comprehensive ROS was otherwise neg          I reviewed other history in epic as below.    Past Medical/Surgical History:  Past Medical History:   Diagnosis Date     Anemia      Biliary stricture of transplanted liver (H) 2018     BPH (benign prostatic hyperplasia)      Cancer (H)     hepatocellualr carcinoma     Cirrhosis of liver (H) 2018     Diabetes (H)      Enlarged prostate      Hypothyroidism      Inguinal hernia     Repaired with mesh on 18     Liver lesion 2018     Liver transplanted (H) 2018     donor liver transplant     Portal vein thrombosis     on path explant     Postoperative atrial fibrillation (H) 2018     Prostate cancer (H)      TB lung, latent     Treated      Past Surgical History:   Procedure Laterality Date     APPENDECTOMY       COLONOSCOPY           DAVINCI PROSTATECTOMY N/A 1/3/2020    Procedure: Robot-assisted laparoscopic radical prostatectomy, Bladder biopsy;  Surgeon: Kenrick Casiano MD;  Location: [facility]     ENDOSCOPIC RETROGRADE CHOLANGIOPANCREATOGRAM N/A 2018    Procedure: ENDOSCOPIC RETROGRADE CHOLANGIOPANCREATOGRAM, with Billary  "Sphincterotomy and Billary Stent Placement;  Surgeon: Omero Lawler MD;  Location: [facility] OR     ENDOSCOPIC RETROGRADE CHOLANGIOPANCREATOGRAM  2019    Procedure: COMBINED ENDOSCOPIC RETROGRADE CHOLANGIOPANCREATOGRAPHY, Bile Duct Stent Exchange;  Surgeon: Omero Lawler MD;  Location: [facility] OR     ENDOSCOPIC RETROGRADE CHOLANGIOPANCREATOGRAM N/A 2019    Procedure: ENDOSCOPIC RETROGRADE CHOLANGIOPANCREATOGRAPHY, WITH BILIARY STENT REMOVAL AND STONE EXTRACTION;  Surgeon: Omero Lawler MD;  Location: [facility] OR     HERNIORRHAPHY INGUINAL  2018    Procedure: Herniorrhaphy inguinal;  Surgeon: Emil Echevarria MD;  Location: [facility] OR     IR LIVER BIOPSY PERCUTANEOUS  2018     LAPAROTOMY EXPLORATORY      per the patient \"for infection in the LLQ\"      TRANSPLANT LIVER RECIPIENT  DONOR N/A 2018    Procedure: TRANSPLANT LIVER RECIPIENT  DONOR;  Surgeon: Emil Echevarria MD;  Location: [facility] OR     Cancer History:   As above    Allergies:  Allergies as of 10/01/2020     (No Known Allergies)     Current Medications:  Current Outpatient Medications   Medication Sig Dispense Refill     amLODIPine (NORVASC) 10 MG tablet Take 1 tablet (10 mg) by mouth daily 90 tablet 3     aspirin (ASA) 325 MG EC tablet Take 1 tablet (325 mg) by mouth daily 90 tablet 3     bisacodyl (DULCOLAX) 10 MG suppository Place 1 suppository (10 mg) rectally daily For constipation. Stop if diarrhea occurs. 10 suppository 1     blood glucose (NO BRAND SPECIFIED) lancets standard Use to test blood sugar 4 times daily or as directed. 200 each 11     blood glucose (ONETOUCH VERIO IQ) test strip Use to test blood sugar 4 times daily or as directed. 200 strip 11     blood glucose monitoring (NO BRAND SPECIFIED) meter device kit Use to test blood sugar 2 times daily or as directed. 1 kit 0     glucose 40 % (400 mg/mL) gel Take 15-30 g by mouth every 15 minutes as needed for low blood sugar 30 g 3     " Lancets (ONETOUCH DELICA PLUS [id number]) MISC U TO TEST QID OR UTD       levothyroxine (SYNTHROID/LEVOTHROID) 50 MCG tablet Take 1 tablet (50 mcg) by mouth daily 90 tablet 1     levothyroxine (SYNTHROID/LEVOTHROID) 75 MCG tablet Take 1 tablet (75 mcg) by mouth daily 90 tablet 3     magnesium hydroxide (MILK OF MAGNESIA) 400 MG/5ML suspension Take 30 mLs by mouth daily as needed for constipation or heartburn (Stop if diarrhea occurs) 355 mL 1     metFORMIN (GLUCOPHAGE-XR) 500 MG 24 hr tablet Take 2 tablets (1,000 mg) by mouth 2 times daily (with meals) 360 tablet 2     Metoprolol Tartrate 75 MG TABS Take 75 mg by mouth 2 times daily 180 tablet 2     mineral oil liquid Take 30 mLs by mouth daily For constipation. Stop if diarrhea occurs. 500 mL 1     Multiple Vitamin (DAILY-STEVE) TABS TK 1 T PO D.       polyethylene glycol (MIRALAX) 17 GM/Dose powder Take 17 g (1 capful) by mouth daily 507 g 3     polyethylene glycol (MIRALAX) packet Take 17 g by mouth daily 30 packet 3     polyethylene glycol (MIRALAX) powder MIX 17 GRAMS AND DRINK D       Sharps Container MISC 1 each daily 1 each 2     sildenafil (REVATIO) 20 MG tablet Take 3-5 tablets ( mg) by mouth daily as needed (1/2 hour prior to sexual activity) 60 tablet 3     sildenafil (VIAGRA) 100 MG tablet Take 1/2 to 1 full pill by mouth 1/2 hour before sexual activity 10 tablet 3     sitagliptin (JANUVIA) 25 MG tablet Take 1 tablet (25 mg) by mouth daily 90 tablet 1     tacrolimus (GENERIC EQUIVALENT) 1 MG capsule Take 2 capsules (2 mg) by mouth every 12 hours 120 capsule 11     ursodiol (ACTIGALL) 250 MG tablet Take 1 tablet (250 mg) by mouth 2 times daily 180 tablet 3      Family History:  Family History   Problem Relation Age of Onset     Memory loss Mother      Liver Disease Brother      Skin Cancer No family hx of      Melanoma No family hx of       Maternal grand mother had Uterus cancer at 63  He has 3 living children. One son with schizophrenia  One  daughter dies of leukemia at 8 years of age  Son  shortly after birth. He had some brain congenital anomaly    Social History:  Social History     Socioeconomic History     Marital status:      Spouse name: Not on file     Number of children: Not on file     Years of education: Not on file     Highest education level: Not on file   Occupational History     Not on file   Social Needs     Financial resource strain: Not on file     Food insecurity     Worry: Not on file     Inability: Not on file     Transportation needs     Medical: Not on file     Non-medical: Not on file   Tobacco Use     Smoking status: Former Smoker     Packs/day: 0.03     Years: 20.00     Pack years: 0.60     Types: Cigarettes, Cigars     Start date: 1970     Quit date: 3/14/2018     Years since quittin.5     Smokeless tobacco: Never Used     Tobacco comment: Quit smoking 2018, one cigarette after dinner   Substance and Sexual Activity     Alcohol use: No     Frequency: Never     Comment: Quit drinking 2018     Drug use: No     Sexual activity: Not on file   Lifestyle     Physical activity     Days per week: Not on file     Minutes per session: Not on file     Stress: Not on file   Relationships     Social connections     Talks on phone: Not on file     Gets together: Not on file     Attends Hinduism service: Not on file     Active member of club or organization: Not on file     Attends meetings of clubs or organizations: Not on file     Relationship status: Not on file     Intimate partner violence     Fear of current or ex partner: Not on file     Emotionally abused: Not on file     Physically abused: Not on file     Forced sexual activity: Not on file   Other Topics Concern     Parent/sibling w/ CABG, MI or angioplasty before 65F 55M? Not Asked   Social History Narrative    Born in [place], came to [place] years ago.     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cardboard, trimming meats   smoker for 25 years. One pack for 1  week. Quit in Feb 2018.  Has been a heavy drinker for 30 years, beer about 36 every week. Last drink was around Feb 2018.  No drug use.  Lives with wife. 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  Physical Exam:  There were no vitals taken for this visit.   Wt Readings from Last 4 Encounters:   09/25/20 84.6 kg (186 lb 9.6 oz)   09/23/20 85.2 kg (187 lb 12.8 oz)   03/11/20 85.9 kg (189 lb 4.8 oz)   02/18/20 74.8 kg (165 lb)       Constitutional.  Does not seem to be in any acute distress.  Respiratory.  Speaking in full sentences.  No coughing noted.  Neurological.  Alert and oriented x3.  Psychiatric.  Mood, mentation and affect are normal.  Decision making capacity is intact.      The rest of a comprehensive physical examination is deferred due to Public Health Emergency telephone visit restrictions.        Laboratory/Imaging Studies    Reviewed    Results for DONNA BIGGS (MRN [id number]) as of 10/1/2020 07:34   Ref. Range 9/25/2020 08:20   Sodium Latest Ref Range: 133 - 144 mmol/L 135   Potassium Latest Ref Range: 3.4 - 5.3 mmol/L 4.6   Chloride Latest Ref Range: 94 - 109 mmol/L 104   Carbon Dioxide Latest Ref Range: 20 - 32 mmol/L 26   Urea Nitrogen Latest Ref Range: 7 - 30 mg/dL 26   Creatinine Latest Ref Range: 0.66 - 1.25 mg/dL 0.98   GFR Estimate Latest Ref Range: >60 mL/min/1.73_m2 79   GFR Estimate If Black Latest Ref Range: >60 mL/min/1.73_m2 >90   Calcium Latest Ref Range: 8.5 - 10.1 mg/dL 8.6   Anion Gap Latest Ref Range: 3 - 14 mmol/L 6   Magnesium Latest Ref Range: 1.6 - 2.3 mg/dL 2.2   Albumin Latest Ref Range: 3.4 - 5.0 g/dL 3.6   Protein Total Latest Ref Range: 6.8 - 8.8 g/dL 7.5   Bilirubin Total Latest Ref Range: 0.2 - 1.3 mg/dL 0.8   Alkaline Phosphatase Latest Ref Range: 40 - 150 U/L 175 (H)   ALT Latest Ref Range: 0 - 70 U/L 17   AST Latest Ref Range: 0 - 45 U/L 11   Alpha Fetoprotein Latest Ref Range: 0 - 8 ug/L <1.5   Bilirubin Direct Latest Ref Range: 0.0 - 0.2  mg/dL 0.2   Cholesterol Latest Ref Range: <200 mg/dL 143   HDL Cholesterol Latest Ref Range: >39 mg/dL 38 (L)   LDL Cholesterol Calculated Latest Ref Range: <100 mg/dL 69   Non HDL Cholesterol Latest Ref Range: <130 mg/dL 106   PSA Latest Ref Range: 0 - 4 ug/L <0.01   T4 Free Latest Ref Range: 0.76 - 1.46 ng/dL 1.08   Triglycerides Latest Ref Range: <150 mg/dL 182 (H)   TSH Latest Ref Range: 0.40 - 4.00 mU/L 8.29 (H)   Glucose Latest Ref Range: 70 - 99 mg/dL 320 (H)   WBC Latest Ref Range: 4.0 - 11.0 10e9/L 5.0   Hemoglobin Latest Ref Range: 13.3 - 17.7 g/dL 14.1   Hematocrit Latest Ref Range: 40.0 - 53.0 % 42.5   Platelet Count Latest Ref Range: 150 - 450 10e9/L 235   RBC Count Latest Ref Range: 4.4 - 5.9 10e12/L 4.98   MCV Latest Ref Range: 78 - 100 fl 85   MCH Latest Ref Range: 26.5 - 33.0 pg 28.3   MCHC Latest Ref Range: 31.5 - 36.5 g/dL 33.2   RDW Latest Ref Range: 10.0 - 15.0 % 12.3       MRI ABDOMEN 9/24/2020     CLINICAL HISTORY:  follow up HCC s/p liver transplant     TECHNIQUE:  Images were acquired with and without intravenous contrast  through the abdomen. The following MR images were acquired: TrueFISP,  multiplanar T2 weighted, axial T1 in/out of phase, axial fat-saturated  T1, diffusion-weighted. Multiplanar T1-weighted images with fat  saturation were before contrast administration and at multiple time  points following the administration of intravenous contrast. Contrast  dose: 8 cc of Gadavist injected.     FINDINGS:     Comparison study: MRI 12/27/2018     Liver: Postsurgical changes of orthotopic liver transplant. No  abnormal parenchymal signal. No focal masses identified. No  significant hepatic steatosis or iron deposition. No significant  intrahepatic biliary duct dilatation. Prominent common bile duct,  measures 10 mm.     Gallbladder: Surgically absent.       Spleen: Normal, not enlarged.     Kidneys:  Several T2 hyperintense nonenhancing simple appearing  cortical renal cysts. No kidney  stone, or masses. No pelvocaliectasis.        Adrenal glands: Normal.      Pancreas:  Fatty atrophy of the pancreas. Normal appearance and signal  characteristics of the remaining pancreatic parenchyma. No focal  masses. Pancreatic duct normal in caliber. No side branch ductal  dilatation.      Bowel: Unremarkable, with no evidence of bowel dilatation or abnormal  wall enhancement. Colonic diverticulosis without evidence of  diverticulitis.        Lymph nodes: No abdominal lymphadenopathy by size criteria.     Blood vessels: Major blood vessels are patent with no evidence of clot  or obstruction.         Lung bases: Mild bilateral lower lobes dependent atelectasis. No  abnormal T2 hyperintensity in the lung bases.     Bones and soft tissues: Degenerative changes of the lumbar spine. No  evidence of acute bony abnormality or abnormal soft tissue  enhancement.        Mesentery and abdominal wall: Postoperative changes along the anterior  abdominal wall. Fat-containing umbilical hernia.     Ascites: No free fluid is detected.                                                                        IMPRESSION:  Postoperative changes of liver transplant. No suspicious liver  lesions.      ASSESSMENT/PLAN:    HCC  S8 3.7 cm OPTN 5B lesion  S7 LIRADS 4 lesions x 2    No evidence of metastatic disease    He had chemoembolization of the S8 lesion on 6/13/18.    As there was residual tumor left, he had microwave ablation of residual mass in hepatic segment 8 on 7/2/2018.     S7 IRADS 4 lesions were not treated    Repeat MRI on 10/3/18 shows no residual viable tumor in S8 lesion  S7 LIRADS 4 lesion was less well defined.  He has some scattered LIRADS 3 lesions.      He had liver transplant in Dec 2018 and explanted liver showed S8 viable tumor with 90% necrosis.  S7 1.1 cm HCC and S5 1.4 cm HCC.  3 benign lymph nodes.  It was a ypT2N0 lesion.    Currently no evidence of recurrence.  I plan to repeat labs/MRI in 4  months.      Abdominal bloating sensation. Constant since transplant surgery and likely related to damage to abdominal muscles/nerves from surgery. Potentially an EGD could be done to further evaluate the bloating feeling. I recommend follow up with Dr Carballo.    Diabetes- last blood glucose was 320- he mentions that he has not taken metformin or sitagliptin for the last few days. He mentions his PCP told him to not take it although I am not very sure about it.  He recently got a new blood glucose monitor and he is not sure how to use it properly. I recommend to inform his PCP about this today and have a close follow up with PCP.    We did not address the following today.    Pancytopenia- it resolved after liver transplant.    Prostate Cancer. He developed prostate cancer (4/26/19 - PSA 5.51ng/dL) and is now s/p RALP with Dr. Casiano on 1/3/20, final path: [place] 3+4=7, neg margins, [id number].   Last PSA on 7/3/2020- <0.01.  Cont following with Urology.    Pulmonary nodule-indeterminate 4 mm nodule in  right lower lobe has now resolved. Will not repeat routine imaging for chest for now       RTC in 4 months    All questions answered and he agrees with the plan    Hi Melgar      Total phone call time. 26 minutes

## 2020-10-01 NOTE — PROGRESS NOTES
"Loy Limon is a 65 year old male who is being evaluated via a billable telephone visit.      The patient has been notified of following:     \"This telephone visit will be conducted via a call between you and your physician/provider. We have found that certain health care needs can be provided without the need for a physical exam.  This service lets us provide the care you need with a short phone conversation.  If a prescription is necessary we can send it directly to your pharmacy.  If lab work is needed we can place an order for that and you can then stop by our lab to have the test done at a later time.    Telephone visits are billed at different rates depending on your insurance coverage. During this emergency period, for some insurers they may be billed the same as an in-person visit.  Please reach out to your insurance provider with any questions.    If during the course of the call the physician/provider feels a telephone visit is not appropriate, you will not be charged for this service.\"    Patient has given verbal consent for Telephone visit?  Yes    What phone number would you like to be contacted at? [phone]    How would you like to obtain your AVS? Mail a copy    Jackelyn KINNEY    Phone call duration: 26 minutes    Hi Melgar MD      "

## 2020-10-01 NOTE — PROGRESS NOTES
Oncology follow up visit:  Date on this visit: 10/1/2020     HCC    Primary Physician: [facility], Oncology     History Of Present Illness:      Please see previous note for details. I have copied and updated from prior note.      Mr. Limon is a 65 year old male who has been a chronic heavy alcohol drinker presented with right abdominal pain in March 2018 and workup including a CT scan showed cirrhosis, portal hypertension and 2 hepatic lesions in the right hepatic lobe which were concerning for hepatocellular carcinoma.  He had MRI on 3/16/2018 which confirmed cirrhosis and portal hypertension and splenomegaly. 3.7 cm segment 8 lesion was OPTN 5B.  There was an antecedent 0.9 cm satellite nodule in hepatic segment 8 consistent with LI-RADS 4.  In segment 7, 1.5 cm LIRADS 4 lesion was seen  Another 1.9 cm segment 7 lesion was seen which was indeterminate.  Several other LIRADS 3 lesions were scattered.  Alpha-fetoprotein was not elevated.  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 Hepatitis B surface antibody was reactive consistent with effective vaccination. Hepatitis C antibody was nonreactive.  He has had no ascites. He has had mild hepatic and Neuropathy but was unable to tolerate lactulose because of abdominal discomfort. He has no history of variceal bleeding although he has grade 2 esophageal varices which have not been banded and he continues to be on propranolol.    He had repeat MRI done on 5/24/2018 which showed stable to slight increase in size of the segment 8 lesion.  Segment 7 lesion was consistent with LIRADS 4 lesion  Another segment 7 lesion is also consistent with LIRADS 4 lesion  CT of the chest showed a 4 mm solitary pulmonary nodule in the right lower lobe which remains indeterminate. There was no other evidence of metastasis.  Bone scan was negative for any evidence of metastatic disease.    He had chemoembolization of the S8  lesion on 18.    As there was residual tumor left, he had microwave ablation of residual mass in hepatic segment 8 on 2018.     S7 IRADS 4 lesions were not treated    Repeat MRI on 10/3/18 shows no residual viable tumor in S8 lesion  S7 LIRADS 4 lesion was less well defined.  He has some scattered LIRADS 3 lesions.    He had liver transplant in Dec 2018 and explanted liver showed S8 viable tumor with 90% necrosis.  S7 1.1 cm HCC and S5 1.4 cm HCC.  3 benign lymph nodes.  It was a ypT2N0 lesion.    He developed prostate cancer (19 - PSA 5.51ng/dL) and is now s/p RALP with Dr. Casiano on 1/3/20, final path: [place] 3+4=7, neg margins, [id number].   Last PSA on 7/3/2020- <0.01.      He saw me in Oct 2018 before the transplant and then in 2020.    Interval hx:   This is a telephone visit and professional Tajik Interpretor ID # [id number] is available throughout the visit.  He is doing about the same as before. He denies abdominal pain but has bloating/swelling sensation. Notices some leg edema when he sits for prolonged periods of time. No nausea or vomiting. Has occasional constipation and medications help.   No bleeding or infections. No dyspnea.       ECOG 1    ROS:  A comprehensive ROS was otherwise neg          I reviewed other history in epic as below.    Past Medical/Surgical History:  Past Medical History:   Diagnosis Date     Anemia      Biliary stricture of transplanted liver (H) 2018     BPH (benign prostatic hyperplasia)      Cancer (H)     hepatocellualr carcinoma     Cirrhosis of liver (H) 2018     Diabetes (H)      Enlarged prostate      Hypothyroidism      Inguinal hernia     Repaired with mesh on 18     Liver lesion 2018     Liver transplanted (H) 2018     donor liver transplant     Portal vein thrombosis     on path explant     Postoperative atrial fibrillation (H) 2018     Prostate cancer (H)      TB lung, latent     Treated      Past Surgical  "History:   Procedure Laterality Date     APPENDECTOMY       COLONOSCOPY      2018     DAVINCI PROSTATECTOMY N/A 1/3/2020    Procedure: Robot-assisted laparoscopic radical prostatectomy, Bladder biopsy;  Surgeon: Kenrick Casiano MD;  Location: [facility]     ENDOSCOPIC RETROGRADE CHOLANGIOPANCREATOGRAM N/A 2018    Procedure: ENDOSCOPIC RETROGRADE CHOLANGIOPANCREATOGRAM, with Billary Sphincterotomy and Billary Stent Placement;  Surgeon: Omero Lawler MD;  Location: [facility]     ENDOSCOPIC RETROGRADE CHOLANGIOPANCREATOGRAM  2019    Procedure: COMBINED ENDOSCOPIC RETROGRADE CHOLANGIOPANCREATOGRAPHY, Bile Duct Stent Exchange;  Surgeon: Omero Lawler MD;  Location: [facility]     ENDOSCOPIC RETROGRADE CHOLANGIOPANCREATOGRAM N/A 2019    Procedure: ENDOSCOPIC RETROGRADE CHOLANGIOPANCREATOGRAPHY, WITH BILIARY STENT REMOVAL AND STONE EXTRACTION;  Surgeon: Omero Lawler MD;  Location: [facility] OR     HERNIORRHAPHY INGUINAL  2018    Procedure: Herniorrhaphy inguinal;  Surgeon: Emil Echevarria MD;  Location: [facility]     IR LIVER BIOPSY PERCUTANEOUS  2018     LAPAROTOMY EXPLORATORY      per the patient \"for infection in the LLQ\"      TRANSPLANT LIVER RECIPIENT  DONOR N/A 2018    Procedure: TRANSPLANT LIVER RECIPIENT  DONOR;  Surgeon: Emli Echevarria MD;  Location: [facility] OR     Cancer History:   As above    Allergies:  Allergies as of 10/01/2020     (No Known Allergies)     Current Medications:  Current Outpatient Medications   Medication Sig Dispense Refill     amLODIPine (NORVASC) 10 MG tablet Take 1 tablet (10 mg) by mouth daily 90 tablet 3     aspirin (ASA) 325 MG EC tablet Take 1 tablet (325 mg) by mouth daily 90 tablet 3     bisacodyl (DULCOLAX) 10 MG suppository Place 1 suppository (10 mg) rectally daily For constipation. Stop if diarrhea occurs. 10 suppository 1     blood glucose (NO BRAND SPECIFIED) lancets standard Use to test blood sugar 4 " times daily or as directed. 200 each 11     blood glucose (ONETOUCH VERIO IQ) test strip Use to test blood sugar 4 times daily or as directed. 200 strip 11     blood glucose monitoring (NO BRAND SPECIFIED) meter device kit Use to test blood sugar 2 times daily or as directed. 1 kit 0     glucose 40 % (400 mg/mL) gel Take 15-30 g by mouth every 15 minutes as needed for low blood sugar 30 g 3     Lancets (ONETOUCH DELICA PLUS [id number]) MISC U TO TEST QID OR UTD       levothyroxine (SYNTHROID/LEVOTHROID) 50 MCG tablet Take 1 tablet (50 mcg) by mouth daily 90 tablet 1     levothyroxine (SYNTHROID/LEVOTHROID) 75 MCG tablet Take 1 tablet (75 mcg) by mouth daily 90 tablet 3     magnesium hydroxide (MILK OF MAGNESIA) 400 MG/5ML suspension Take 30 mLs by mouth daily as needed for constipation or heartburn (Stop if diarrhea occurs) 355 mL 1     metFORMIN (GLUCOPHAGE-XR) 500 MG 24 hr tablet Take 2 tablets (1,000 mg) by mouth 2 times daily (with meals) 360 tablet 2     Metoprolol Tartrate 75 MG TABS Take 75 mg by mouth 2 times daily 180 tablet 2     mineral oil liquid Take 30 mLs by mouth daily For constipation. Stop if diarrhea occurs. 500 mL 1     Multiple Vitamin (DAILY-STEVE) TABS TK 1 T PO D.       polyethylene glycol (MIRALAX) 17 GM/Dose powder Take 17 g (1 capful) by mouth daily 507 g 3     polyethylene glycol (MIRALAX) packet Take 17 g by mouth daily 30 packet 3     polyethylene glycol (MIRALAX) powder MIX 17 GRAMS AND DRINK D       Sharps Container MISC 1 each daily 1 each 2     sildenafil (REVATIO) 20 MG tablet Take 3-5 tablets ( mg) by mouth daily as needed (1/2 hour prior to sexual activity) 60 tablet 3     sildenafil (VIAGRA) 100 MG tablet Take 1/2 to 1 full pill by mouth 1/2 hour before sexual activity 10 tablet 3     sitagliptin (JANUVIA) 25 MG tablet Take 1 tablet (25 mg) by mouth daily 90 tablet 1     tacrolimus (GENERIC EQUIVALENT) 1 MG capsule Take 2 capsules (2 mg) by mouth every 12 hours 120 capsule  11     ursodiol (ACTIGALL) 250 MG tablet Take 1 tablet (250 mg) by mouth 2 times daily 180 tablet 3      Family History:  Family History   Problem Relation Age of Onset     Memory loss Mother      Liver Disease Brother      Skin Cancer No family hx of      Melanoma No family hx of       Maternal grand mother had Uterus cancer at 63  He has 3 living children. One son with schizophrenia  One daughter dies of leukemia at 8 years of age  Son  shortly after birth. He had some brain congenital anomaly    Social History:  Social History     Socioeconomic History     Marital status:      Spouse name: Not on file     Number of children: Not on file     Years of education: Not on file     Highest education level: Not on file   Occupational History     Not on file   Social Needs     Financial resource strain: Not on file     Food insecurity     Worry: Not on file     Inability: Not on file     Transportation needs     Medical: Not on file     Non-medical: Not on file   Tobacco Use     Smoking status: Former Smoker     Packs/day: 0.03     Years: 20.00     Pack years: 0.60     Types: Cigarettes, Cigars     Start date: 1970     Quit date: 3/14/2018     Years since quittin.5     Smokeless tobacco: Never Used     Tobacco comment: Quit smoking 2018, one cigarette after dinner   Substance and Sexual Activity     Alcohol use: No     Frequency: Never     Comment: Quit drinking 2018     Drug use: No     Sexual activity: Not on file   Lifestyle     Physical activity     Days per week: Not on file     Minutes per session: Not on file     Stress: Not on file   Relationships     Social connections     Talks on phone: Not on file     Gets together: Not on file     Attends Shinto service: Not on file     Active member of club or organization: Not on file     Attends meetings of clubs or organizations: Not on file     Relationship status: Not on file     Intimate partner violence     Fear of current or ex  partner: Not on file     Emotionally abused: Not on file     Physically abused: Not on file     Forced sexual activity: Not on file   Other Topics Concern     Parent/sibling w/ CABG, MI or angioplasty before 65F 55M? Not Asked   Social History Narrative    Born in [place], came to [place] years ago.     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cardboard, trimming meats   smoker for 25 years. One pack for 1 week. Quit in Feb 2018.  Has been a heavy drinker for 30 years, beer about 36 every week. Last drink was around Feb 2018.  No drug use.  Lives with wife. 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  Physical Exam:  There were no vitals taken for this visit.   Wt Readings from Last 4 Encounters:   09/25/20 84.6 kg (186 lb 9.6 oz)   09/23/20 85.2 kg (187 lb 12.8 oz)   03/11/20 85.9 kg (189 lb 4.8 oz)   02/18/20 74.8 kg (165 lb)       Constitutional.  Does not seem to be in any acute distress.  Respiratory.  Speaking in full sentences.  No coughing noted.  Neurological.  Alert and oriented x3.  Psychiatric.  Mood, mentation and affect are normal.  Decision making capacity is intact.      The rest of a comprehensive physical examination is deferred due to Public Health Emergency telephone visit restrictions.        Laboratory/Imaging Studies    Reviewed    Results for DONNA BIGGS (MRN [id number]) as of 10/1/2020 07:34   Ref. Range 9/25/2020 08:20   Sodium Latest Ref Range: 133 - 144 mmol/L 135   Potassium Latest Ref Range: 3.4 - 5.3 mmol/L 4.6   Chloride Latest Ref Range: 94 - 109 mmol/L 104   Carbon Dioxide Latest Ref Range: 20 - 32 mmol/L 26   Urea Nitrogen Latest Ref Range: 7 - 30 mg/dL 26   Creatinine Latest Ref Range: 0.66 - 1.25 mg/dL 0.98   GFR Estimate Latest Ref Range: >60 mL/min/1.73_m2 79   GFR Estimate If Black Latest Ref Range: >60 mL/min/1.73_m2 >90   Calcium Latest Ref Range: 8.5 - 10.1 mg/dL 8.6   Anion Gap Latest Ref Range: 3 - 14 mmol/L 6   Magnesium Latest Ref Range: 1.6 - 2.3 mg/dL 2.2    Albumin Latest Ref Range: 3.4 - 5.0 g/dL 3.6   Protein Total Latest Ref Range: 6.8 - 8.8 g/dL 7.5   Bilirubin Total Latest Ref Range: 0.2 - 1.3 mg/dL 0.8   Alkaline Phosphatase Latest Ref Range: 40 - 150 U/L 175 (H)   ALT Latest Ref Range: 0 - 70 U/L 17   AST Latest Ref Range: 0 - 45 U/L 11   Alpha Fetoprotein Latest Ref Range: 0 - 8 ug/L <1.5   Bilirubin Direct Latest Ref Range: 0.0 - 0.2 mg/dL 0.2   Cholesterol Latest Ref Range: <200 mg/dL 143   HDL Cholesterol Latest Ref Range: >39 mg/dL 38 (L)   LDL Cholesterol Calculated Latest Ref Range: <100 mg/dL 69   Non HDL Cholesterol Latest Ref Range: <130 mg/dL 106   PSA Latest Ref Range: 0 - 4 ug/L <0.01   T4 Free Latest Ref Range: 0.76 - 1.46 ng/dL 1.08   Triglycerides Latest Ref Range: <150 mg/dL 182 (H)   TSH Latest Ref Range: 0.40 - 4.00 mU/L 8.29 (H)   Glucose Latest Ref Range: 70 - 99 mg/dL 320 (H)   WBC Latest Ref Range: 4.0 - 11.0 10e9/L 5.0   Hemoglobin Latest Ref Range: 13.3 - 17.7 g/dL 14.1   Hematocrit Latest Ref Range: 40.0 - 53.0 % 42.5   Platelet Count Latest Ref Range: 150 - 450 10e9/L 235   RBC Count Latest Ref Range: 4.4 - 5.9 10e12/L 4.98   MCV Latest Ref Range: 78 - 100 fl 85   MCH Latest Ref Range: 26.5 - 33.0 pg 28.3   MCHC Latest Ref Range: 31.5 - 36.5 g/dL 33.2   RDW Latest Ref Range: 10.0 - 15.0 % 12.3       MRI ABDOMEN 9/24/2020     CLINICAL HISTORY:  follow up HCC s/p liver transplant     TECHNIQUE:  Images were acquired with and without intravenous contrast  through the abdomen. The following MR images were acquired: TrueFISP,  multiplanar T2 weighted, axial T1 in/out of phase, axial fat-saturated  T1, diffusion-weighted. Multiplanar T1-weighted images with fat  saturation were before contrast administration and at multiple time  points following the administration of intravenous contrast. Contrast  dose: 8 cc of Gadavist injected.     FINDINGS:     Comparison study: MRI 12/27/2018     Liver: Postsurgical changes of orthotopic liver  transplant. No  abnormal parenchymal signal. No focal masses identified. No  significant hepatic steatosis or iron deposition. No significant  intrahepatic biliary duct dilatation. Prominent common bile duct,  measures 10 mm.     Gallbladder: Surgically absent.       Spleen: Normal, not enlarged.     Kidneys:  Several T2 hyperintense nonenhancing simple appearing  cortical renal cysts. No kidney stone, or masses. No pelvocaliectasis.        Adrenal glands: Normal.      Pancreas:  Fatty atrophy of the pancreas. Normal appearance and signal  characteristics of the remaining pancreatic parenchyma. No focal  masses. Pancreatic duct normal in caliber. No side branch ductal  dilatation.      Bowel: Unremarkable, with no evidence of bowel dilatation or abnormal  wall enhancement. Colonic diverticulosis without evidence of  diverticulitis.        Lymph nodes: No abdominal lymphadenopathy by size criteria.     Blood vessels: Major blood vessels are patent with no evidence of clot  or obstruction.         Lung bases: Mild bilateral lower lobes dependent atelectasis. No  abnormal T2 hyperintensity in the lung bases.     Bones and soft tissues: Degenerative changes of the lumbar spine. No  evidence of acute bony abnormality or abnormal soft tissue  enhancement.        Mesentery and abdominal wall: Postoperative changes along the anterior  abdominal wall. Fat-containing umbilical hernia.     Ascites: No free fluid is detected.                                                                        IMPRESSION:  Postoperative changes of liver transplant. No suspicious liver  lesions.      ASSESSMENT/PLAN:    HCC  S8 3.7 cm OPTN 5B lesion  S7 LIRADS 4 lesions x 2    No evidence of metastatic disease    He had chemoembolization of the S8 lesion on 6/13/18.    As there was residual tumor left, he had microwave ablation of residual mass in hepatic segment 8 on 7/2/2018.     S7 IRADS 4 lesions were not treated    Repeat MRI on 10/3/18  shows no residual viable tumor in S8 lesion  S7 LIRADS 4 lesion was less well defined.  He has some scattered LIRADS 3 lesions.      He had liver transplant in Dec 2018 and explanted liver showed S8 viable tumor with 90% necrosis.  S7 1.1 cm HCC and S5 1.4 cm HCC.  3 benign lymph nodes.  It was a ypT2N0 lesion.    Currently no evidence of recurrence.  I plan to repeat labs/MRI in 4 months.      Abdominal bloating sensation. Constant since transplant surgery and likely related to damage to abdominal muscles/nerves from surgery. Potentially an EGD could be done to further evaluate the bloating feeling. I recommend follow up with Dr Carballo.    Diabetes- last blood glucose was 320- he mentions that he has not taken metformin or sitagliptin for the last few days. He mentions his PCP told him to not take it although I am not very sure about it.  He recently got a new blood glucose monitor and he is not sure how to use it properly. I recommend to inform his PCP about this today and have a close follow up with PCP.    We did not address the following today.    Pancytopenia- it resolved after liver transplant.    Prostate Cancer. He developed prostate cancer (4/26/19 - PSA 5.51ng/dL) and is now s/p RALP with Dr. Casiano on 1/3/20, final path: [place] 3+4=7, neg margins, [id number].   Last PSA on 7/3/2020- <0.01.  Cont following with Urology.    Pulmonary nodule-indeterminate 4 mm nodule in  right lower lobe has now resolved. Will not repeat routine imaging for chest for now       RTC in 4 months    All questions answered and he agrees with the plan    Hi Melgar      Total phone call time. 26 minutes

## 2020-10-01 NOTE — PATIENT INSTRUCTIONS
Labs and MRI in 4 months and see me a few days after that    Please follow up with PCP regarding better blood glucose ( Diabetes ) control

## 2020-10-08 ENCOUNTER — APPOINTMENT (OUTPATIENT)
Dept: INTERPRETER SERVICES | Facility: CLINIC | Age: 65
End: 2020-10-08

## 2020-10-08 NOTE — PROGRESS NOTES
"Dm  Dianne Cagle, [facility]    Outcome for 10/08/20 9:22 AM :Patient stated they are not currently checking their glucose     Loy Limon is a 65 year old male who is being evaluated via a billable telephone visit.      The patient has been notified of following:     \"This telephone visit will be conducted via a call between you and your physician/provider. We have found that certain health care needs can be provided without the need for a physical exam.  This service lets us provide the care you need with a short phone conversation.  If a prescription is necessary we can send it directly to your pharmacy.  If lab work is needed we can place an order for that and you can then stop by our lab to have the test done at a later time.    Telephone visits are billed at different rates depending on your insurance coverage. During this emergency period, for some insurers they may be billed the same as an in-person visit.  Please reach out to your insurance provider with any questions.    If during the course of the call the physician/provider feels a telephone visit is not appropriate, you will not be charged for this service.\"    Patient has given verbal consent for Telephone visit?  Yes    What phone number would you like to be contacted at? [phone]    How would you like to obtain your AVS? Mail a copy    Phone call duration: 19 minutes    Sue Tavares MD       Endocrinology and Diabetes Clinic Return Visit    Subjective:   Loy Limon is a 65 year old male seen today for a follow up visit for steroid/immunosuppresant induced diabetes mellitus and hypothyroidism. Due to the COVID-19 pandemic this visit was converted to a virtual visit. I contacted the patient using a  over the telephone today.     He has history of cirrhosis with HCC, portal HTN, latent TB and underwent living donor liver transplant on 12/22/2018. He developed steroid/immunosuppressant induced diabetes and was treated with NPH " "insulin, which was discontinued once he was no longer taking steroids.    He has been feeling fine recently. He does have some bloating and has seen his PCP for this.     Current glucose-lowering medications are metformin 1000 mg twice daily and Januvia 25 mg daily (added at visit in 7/2020 with Gilma Guthrie).    He has not been checking blood glucose recently because his glucose meter stopped working; he was prescribed a new one but he is not sure how to use this.     There has been no symptomatic hypoglycemia. No symptoms of hyperglycemia.     Increased dose of levothyroxine 75 mcg daily was started 2 weeks ago. He notes his throat feels \"\" but otherwise does not notice much difference in the way he feels.     Medications:   Current Outpatient Medications   Medication Sig Dispense Refill     amLODIPine (NORVASC) 10 MG tablet Take 1 tablet (10 mg) by mouth daily 90 tablet 3     aspirin (ASA) 325 MG EC tablet Take 1 tablet (325 mg) by mouth daily 90 tablet 3     bisacodyl (DULCOLAX) 10 MG suppository Place 1 suppository (10 mg) rectally daily For constipation. Stop if diarrhea occurs. 10 suppository 1     blood glucose (NO BRAND SPECIFIED) lancets standard Use to test blood sugar 4 times daily or as directed. 200 each 11     blood glucose (ONETOUCH VERIO IQ) test strip Use to test blood sugar 4 times daily or as directed. 200 strip 11     blood glucose monitoring (NO BRAND SPECIFIED) meter device kit Use to test blood sugar 2 times daily or as directed. 1 kit 0     glucose 40 % (400 mg/mL) gel Take 15-30 g by mouth every 15 minutes as needed for low blood sugar 30 g 3     Lancets (ONETOUCH DELICA PLUS [id number]) MISC U TO TEST QID OR UTD       levothyroxine (SYNTHROID/LEVOTHROID) 75 MCG tablet Take 1 tablet (75 mcg) by mouth daily 90 tablet 3     magnesium hydroxide (MILK OF MAGNESIA) 400 MG/5ML suspension Take 30 mLs by mouth daily as needed for constipation or heartburn (Stop if diarrhea occurs) 355 mL 1 "     metFORMIN (GLUCOPHAGE-XR) 500 MG 24 hr tablet Take 2 tablets (1,000 mg) by mouth 2 times daily (with meals) 360 tablet 2     Metoprolol Tartrate 75 MG TABS Take 75 mg by mouth 2 times daily 180 tablet 2     mineral oil liquid Take 30 mLs by mouth daily For constipation. Stop if diarrhea occurs. 500 mL 1     Multiple Vitamin (DAILY-STEVE) TABS TK 1 T PO D.       polyethylene glycol (MIRALAX) 17 GM/Dose powder Take 17 g (1 capful) by mouth daily 507 g 3     polyethylene glycol (MIRALAX) packet Take 17 g by mouth daily 30 packet 3     polyethylene glycol (MIRALAX) powder MIX 17 GRAMS AND DRINK D       Sharps Container MISC 1 each daily 1 each 2     sildenafil (REVATIO) 20 MG tablet Take 3-5 tablets ( mg) by mouth daily as needed (1/2 hour prior to sexual activity) 60 tablet 3     sildenafil (VIAGRA) 100 MG tablet Take 1/2 to 1 full pill by mouth 1/2 hour before sexual activity 10 tablet 3     sitagliptin (JANUVIA) 25 MG tablet Take 1 tablet (25 mg) by mouth daily 90 tablet 1     tacrolimus (GENERIC EQUIVALENT) 1 MG capsule Take 2 capsules (2 mg) by mouth every 12 hours 120 capsule 11     ursodiol (ACTIGALL) 250 MG tablet Take 1 tablet (250 mg) by mouth 2 times daily 180 tablet 3       Social History     Tobacco Use     Smoking status: Former Smoker     Packs/day: 0.03     Years: 20.00     Pack years: 0.60     Types: Cigarettes, Cigars     Start date: 1970     Quit date: 3/14/2018     Years since quittin.5     Smokeless tobacco: Never Used     Tobacco comment: Quit smoking 2018, one cigarette after dinner   Substance Use Topics     Alcohol use: No     Frequency: Never     Comment: Quit drinking 2018       Review of Systems:   A comprehensive ROS was negative except as noted in HPI.     Physical Examination:  Wt Readings from Last 4 Encounters:   20 84.6 kg (186 lb 9.6 oz)   20 85.2 kg (187 lb 12.8 oz)   20 85.9 kg (189 lb 4.8 oz)   20 74.8 kg (165 lb)       No exam  today, telephone visit.       Labs and Studies:   Lab Results   Component Value Date    A1C 7.2 01/03/2020    A1C 7.3 09/04/2019    A1C 8.5 05/23/2019    A1C 8.6 04/10/2019    A1C 5.8 12/22/2018     ENDO THYROID LABS-[facility] Latest Ref Rng & Units 9/25/2020 7/3/2020   TSH 0.40 - 4.00 mU/L 8.29 (H) 7.88 (H)   T4 FREE 0.76 - 1.46 ng/dL 1.08 1.09     ENDO DIABETES Latest Ref Rng & Units 9/25/2020   CREATININE 0.66 - 1.25 mg/dL 0.98       Assessment:  1. Steroid/immunosuppressant induced diabetes.    A. Mild peripheral neuropathy, managed with gabapentin.    B. Elevated urine albumin.   2. Hypothyroidism. Levothyroxine dose increased 2 weeks ago.   3. Liver transplant.   4. Prostate cancer s/p prostatectomy.   5. Hypertension.     Plan:   Continue current metformin and Januvia.   Referral to CDE for glucose meter teaching. He notes that he will need an early morning appointment (8 AM) because he works the overnight shift.   Check TSH in 6 weeks; will also check hemoglobin A1c at that time.     Follow up in 2 months with Gilma Guthrie or with me.     Sue Tavares MD   Diabetes and Endocrinology   [phone]

## 2020-10-09 ENCOUNTER — VIRTUAL VISIT (OUTPATIENT)
Dept: ENDOCRINOLOGY | Facility: CLINIC | Age: 65
End: 2020-10-09
Attending: INTERNAL MEDICINE
Payer: COMMERCIAL

## 2020-10-09 DIAGNOSIS — E03.9 HYPOTHYROIDISM, UNSPECIFIED TYPE: ICD-10-CM

## 2020-10-09 DIAGNOSIS — E09.9 DRUG-INDUCED DIABETES MELLITUS (H): Primary | ICD-10-CM

## 2020-10-09 PROCEDURE — 99214 OFFICE O/P EST MOD 30 MIN: CPT | Mod: 95 | Performed by: INTERNAL MEDICINE

## 2020-10-09 NOTE — LETTER
"10/9/2020       RE: Loy Limon  [address]  [place] [address]     Dear Colleague,    Thank you for referring your patient, Loy Limon, to the [facility] [place] at [facility]. Please see a copy of my visit note below.    Sonny Cagle CMA    Outcome for 10/08/20 9:22 AM :Patient stated they are not currently checking their glucose     Loy Limon is a 65 year old male who is being evaluated via a billable telephone visit.      The patient has been notified of following:     \"This telephone visit will be conducted via a call between you and your physician/provider. We have found that certain health care needs can be provided without the need for a physical exam.  This service lets us provide the care you need with a short phone conversation.  If a prescription is necessary we can send it directly to your pharmacy.  If lab work is needed we can place an order for that and you can then stop by our lab to have the test done at a later time.    Telephone visits are billed at different rates depending on your insurance coverage. During this emergency period, for some insurers they may be billed the same as an in-person visit.  Please reach out to your insurance provider with any questions.    If during the course of the call the physician/provider feels a telephone visit is not appropriate, you will not be charged for this service.\"    Patient has given verbal consent for Telephone visit?  Yes    What phone number would you like to be contacted at? [phone]    How would you like to obtain your AVS? Mail a copy    Phone call duration: 19 minutes    Sue Tavares MD       Endocrinology and Diabetes Clinic Return Visit    Subjective:   Loy Limon is a 65 year old male seen today for a follow up visit for steroid/immunosuppresant induced diabetes mellitus and hypothyroidism. Due to the COVID-19 pandemic this " "visit was converted to a virtual visit. I contacted the patient using a  over the telephone today.     He has history of cirrhosis with HCC, portal HTN, latent TB and underwent living donor liver transplant on 12/22/2018. He developed steroid/immunosuppressant induced diabetes and was treated with NPH insulin, which was discontinued once he was no longer taking steroids.    He has been feeling fine recently. He does have some bloating and has seen his PCP for this.     Current glucose-lowering medications are metformin 1000 mg twice daily and Januvia 25 mg daily (added at visit in 7/2020 with Gilma Guthrie).    He has not been checking blood glucose recently because his glucose meter stopped working; he was prescribed a new one but he is not sure how to use this.     There has been no symptomatic hypoglycemia. No symptoms of hyperglycemia.     Increased dose of levothyroxine 75 mcg daily was started 2 weeks ago. He notes his throat feels \"\" but otherwise does not notice much difference in the way he feels.     Medications:   Current Outpatient Medications   Medication Sig Dispense Refill     amLODIPine (NORVASC) 10 MG tablet Take 1 tablet (10 mg) by mouth daily 90 tablet 3     aspirin (ASA) 325 MG EC tablet Take 1 tablet (325 mg) by mouth daily 90 tablet 3     bisacodyl (DULCOLAX) 10 MG suppository Place 1 suppository (10 mg) rectally daily For constipation. Stop if diarrhea occurs. 10 suppository 1     blood glucose (NO BRAND SPECIFIED) lancets standard Use to test blood sugar 4 times daily or as directed. 200 each 11     blood glucose (ONETOUCH VERIO IQ) test strip Use to test blood sugar 4 times daily or as directed. 200 strip 11     blood glucose monitoring (NO BRAND SPECIFIED) meter device kit Use to test blood sugar 2 times daily or as directed. 1 kit 0     glucose 40 % (400 mg/mL) gel Take 15-30 g by mouth every 15 minutes as needed for low blood sugar 30 g 3     Lancets (ONETOUCH " DELICA PLUS [id number]) MISC U TO TEST QID OR UTD       levothyroxine (SYNTHROID/LEVOTHROID) 75 MCG tablet Take 1 tablet (75 mcg) by mouth daily 90 tablet 3     magnesium hydroxide (MILK OF MAGNESIA) 400 MG/5ML suspension Take 30 mLs by mouth daily as needed for constipation or heartburn (Stop if diarrhea occurs) 355 mL 1     metFORMIN (GLUCOPHAGE-XR) 500 MG 24 hr tablet Take 2 tablets (1,000 mg) by mouth 2 times daily (with meals) 360 tablet 2     Metoprolol Tartrate 75 MG TABS Take 75 mg by mouth 2 times daily 180 tablet 2     mineral oil liquid Take 30 mLs by mouth daily For constipation. Stop if diarrhea occurs. 500 mL 1     Multiple Vitamin (DAILY-STEVE) TABS TK 1 T PO D.       polyethylene glycol (MIRALAX) 17 GM/Dose powder Take 17 g (1 capful) by mouth daily 507 g 3     polyethylene glycol (MIRALAX) packet Take 17 g by mouth daily 30 packet 3     polyethylene glycol (MIRALAX) powder MIX 17 GRAMS AND DRINK D       Sharps Container MISC 1 each daily 1 each 2     sildenafil (REVATIO) 20 MG tablet Take 3-5 tablets ( mg) by mouth daily as needed (1/2 hour prior to sexual activity) 60 tablet 3     sildenafil (VIAGRA) 100 MG tablet Take 1/2 to 1 full pill by mouth 1/2 hour before sexual activity 10 tablet 3     sitagliptin (JANUVIA) 25 MG tablet Take 1 tablet (25 mg) by mouth daily 90 tablet 1     tacrolimus (GENERIC EQUIVALENT) 1 MG capsule Take 2 capsules (2 mg) by mouth every 12 hours 120 capsule 11     ursodiol (ACTIGALL) 250 MG tablet Take 1 tablet (250 mg) by mouth 2 times daily 180 tablet 3       Social History     Tobacco Use     Smoking status: Former Smoker     Packs/day: 0.03     Years: 20.00     Pack years: 0.60     Types: Cigarettes, Cigars     Start date: 1970     Quit date: 3/14/2018     Years since quittin.5     Smokeless tobacco: Never Used     Tobacco comment: Quit smoking 2018, one cigarette after dinner   Substance Use Topics     Alcohol use: No     Frequency: Never     Comment:  Quit drinking Feb 2018       Review of Systems:   A comprehensive ROS was negative except as noted in HPI.     Physical Examination:  Wt Readings from Last 4 Encounters:   09/25/20 84.6 kg (186 lb 9.6 oz)   09/23/20 85.2 kg (187 lb 12.8 oz)   03/11/20 85.9 kg (189 lb 4.8 oz)   02/18/20 74.8 kg (165 lb)       No exam today, telephone visit.       Labs and Studies:   Lab Results   Component Value Date    A1C 7.2 01/03/2020    A1C 7.3 09/04/2019    A1C 8.5 05/23/2019    A1C 8.6 04/10/2019    A1C 5.8 12/22/2018     ENDO THYROID LABS-[facility] Latest Ref Rng & Units 9/25/2020 7/3/2020   TSH 0.40 - 4.00 mU/L 8.29 (H) 7.88 (H)   T4 FREE 0.76 - 1.46 ng/dL 1.08 1.09     ENDO DIABETES Latest Ref Rng & Units 9/25/2020   CREATININE 0.66 - 1.25 mg/dL 0.98       Assessment:  1. Steroid/immunosuppressant induced diabetes.    A. Mild peripheral neuropathy, managed with gabapentin.    B. Elevated urine albumin.   2. Hypothyroidism. Levothyroxine dose increased 2 weeks ago.   3. Liver transplant.   4. Prostate cancer s/p prostatectomy.   5. Hypertension.     Plan:   Continue current metformin and Januvia.   Referral to CDE for glucose meter teaching. He notes that he will need an early morning appointment (8 AM) because he works the overnight shift.   Check TSH in 6 weeks; will also check hemoglobin A1c at that time.     Follow up in 2 months with Gilma Guthrie or with me.     Sue Tavares MD   Diabetes and Endocrinology   [phone]

## 2020-10-16 ENCOUNTER — VIRTUAL VISIT (OUTPATIENT)
Dept: NEPHROLOGY | Facility: CLINIC | Age: 65
End: 2020-10-16
Attending: INTERNAL MEDICINE
Payer: COMMERCIAL

## 2020-10-16 ENCOUNTER — PRE VISIT (OUTPATIENT)
Dept: NEPHROLOGY | Facility: CLINIC | Age: 65
End: 2020-10-16

## 2020-10-16 DIAGNOSIS — I10 ESSENTIAL HYPERTENSION: ICD-10-CM

## 2020-10-16 DIAGNOSIS — Z94.4 LIVER TRANSPLANTED (H): ICD-10-CM

## 2020-10-16 DIAGNOSIS — N18.2 CKD (CHRONIC KIDNEY DISEASE) STAGE 2, GFR 60-89 ML/MIN: Primary | ICD-10-CM

## 2020-10-16 PROCEDURE — 99215 OFFICE O/P EST HI 40 MIN: CPT | Mod: 95 | Performed by: STUDENT IN AN ORGANIZED HEALTH CARE EDUCATION/TRAINING PROGRAM

## 2020-10-16 PROCEDURE — T1013 SIGN LANG/ORAL INTERPRETER: HCPCS | Mod: U3,TEL

## 2020-10-16 ASSESSMENT — PAIN SCALES - GENERAL: PAINLEVEL: NO PAIN (0)

## 2020-10-16 NOTE — LETTER
"10/16/2020       RE: Loy Limon  [address]  [facility] [address]     Dear Colleague,    Thank you for referring your patient, Loy Limon, to the [facility]PHROLOG[facility] [place] at [facility]. Please see a copy of my visit note below.    Loy Limon is a 65 year old male who is being evaluated via a billable telephone visit.      The patient has been notified of following:     \"This telephone visit will be conducted via a call between you and your physician/provider. We have found that certain health care needs can be provided without the need for a physical exam.  This service lets us provide the care you need with a short phone conversation.  If a prescription is necessary we can send it directly to your pharmacy.  If lab work is needed we can place an order for that and you can then stop by our lab to have the test done at a later time.    Telephone visits are billed at different rates depending on your insurance coverage. During this emergency period, for some insurers they may be billed the same as an in-person visit.  Please reach out to your insurance provider with any questions.    If during the course of the call the physician/provider feels a telephone visit is not appropriate, you will not be charged for this service.\"    Patient has given verbal consent for Telephone visit?  Yes    What phone number would you like to be contacted at? [phone]    How would you like to obtain your AVS? Mail a copy    Phone call duration: 45 minutes (8:08am to 8:53am)    Ryan Eaton MD        Nephrology Clinic    Telephone Visit    CC: proteinuria     HPI:  Loy Limon is a 65 year old male with pmh of EtOH cirrhosis and HCC s/p liver transplant 12/22/2018, prostate cancer, DM2 diagnosed post-transplant referred to nephrology by Dr. Carballo for proteinuria.     His creatinine has risen from 0.5-0.6 to 0.9 mg/dl. He has " developed 0.5 g/g albuminuria over the last year (89 mg/g to 255mg/g to 520mg/g with total proteinuria of 0.7g/g). Last UA without blood or RBCs.     He does not recall ever being on lisinopril or losartan, and I do not see ACEi/ARB in his list of historical meds. He had been on diuretics and spironolactone around time of liver transplant.     His potassium values of late have been high 4s or low 5s.     He recently had BP of 163/91 in the hepatology office, though the pt states he doesn't feel like this was a resting BP. Two days later his BP in his PCP office was 124/69, and he states he felt this was resting.     He recalls his last home-measured BP was: 166 systolic.     He reports mild swelling in his LEs bilat that worsens after a long day working on his feet. No SOB. He does report abdominal swelling since his liver tx. He also reports nausea without emesis but only rare, mild right sided abdominal pain. He relates his nausea to his pill-burden. He reports R back pain. No CP.     NSAIDs: rare to none  PPI: none   He denies kidney stones or UTIs.    Last imaging of kidneys: 3/2019 CT: The kidneys, spleen,  adrenal glands are unremarkable.      was used by phone.     Past Medical History:   Diagnosis Date     Anemia      Biliary stricture of transplanted liver (H) 2018     BPH (benign prostatic hyperplasia)      Cancer (H)     hepatocellualr carcinoma     Cirrhosis of liver (H) 2018     Diabetes (H)      Enlarged prostate      Hypothyroidism      Inguinal hernia     Repaired with mesh on 18     Liver lesion 2018     Liver transplanted (H) 2018     donor liver transplant     Portal vein thrombosis     on path explant     Postoperative atrial fibrillation (H) 2018     Prostate cancer (H)      TB lung, latent     Treated        Past Surgical History:   Procedure Laterality Date     APPENDECTOMY       COLONOSCOPY      2018     DAVINCI PROSTATECTOMY N/A  "1/3/2020    Procedure: Robot-assisted laparoscopic radical prostatectomy, Bladder biopsy;  Surgeon: Kenrick Casiano MD;  [facility]     ENDOSCOPIC RETROGRADE CHOLANGIOPANCREATOGRAM N/A 2018    Procedure: ENDOSCOPIC RETROGRADE CHOLANGIOPANCREATOGRAM, with Billary Sphincterotomy and Billary Stent Placement;  Surgeon: Omero Lawler MD;  [facility]     ENDOSCOPIC RETROGRADE CHOLANGIOPANCREATOGRAM  2019    Procedure: COMBINED ENDOSCOPIC RETROGRADE CHOLANGIOPANCREATOGRAPHY, Bile Duct Stent Exchange;  Surgeon: Omero Lawler MD;  [facility]     ENDOSCOPIC RETROGRADE CHOLANGIOPANCREATOGRAM N/A 2019    Procedure: ENDOSCOPIC RETROGRADE CHOLANGIOPANCREATOGRAPHY, WITH BILIARY STENT REMOVAL AND STONE EXTRACTION;  Surgeon: Omero Lawler MD;  [facility]     HERNIORRHAPHY INGUINAL  2018    Procedure: Herniorrhaphy inguinal;  Surgeon: Emil Echevarria MD;  [facility]     IR LIVER BIOPSY PERCUTANEOUS  2018     LAPAROTOMY EXPLORATORY      per the patient \"for infection in the LLQ\"      TRANSPLANT LIVER RECIPIENT  DONOR N/A 2018    Procedure: TRANSPLANT LIVER RECIPIENT  DONOR;  Surgeon: Emil Echevarria MD;  [facility]        Family History   Problem Relation Age of Onset     Memory loss Mother      Liver Disease Brother      Skin Cancer No family hx of      Melanoma No family hx of    sister has kidney stones     Social History     Tobacco Use     Smoking status: Former Smoker     Packs/day: 0.03     Years: 20.00     Pack years: 0.60     Types: Cigarettes, Cigars     Start date: 1970     Quit date: 3/14/2018     Years since quittin.5     Smokeless tobacco: Never Used     Tobacco comment: Quit smoking 2018, one cigarette after dinner   Substance Use Topics     Alcohol use: No     Frequency: Never     Comment: Quit drinking 2018     Drug use: No       Social History     Social History Narrative "    Born in [place], came to [place] years ago.     Has worked in [employer], trimming meats        Medications:  Current Outpatient Medications   Medication Sig Dispense Refill     amLODIPine (NORVASC) 10 MG tablet Take 1 tablet (10 mg) by mouth daily 90 tablet 3     aspirin (ASA) 325 MG EC tablet Take 1 tablet (325 mg) by mouth daily 90 tablet 3     bisacodyl (DULCOLAX) 10 MG suppository Place 1 suppository (10 mg) rectally daily For constipation. Stop if diarrhea occurs. 10 suppository 1     blood glucose (NO BRAND SPECIFIED) lancets standard Use to test blood sugar 4 times daily or as directed. 200 each 11     blood glucose (ONETOUCH VERIO IQ) test strip Use to test blood sugar 4 times daily or as directed. 200 strip 11     blood glucose monitoring (NO BRAND SPECIFIED) meter device kit Use to test blood sugar 2 times daily or as directed. 1 kit 0     glucose 40 % (400 mg/mL) gel Take 15-30 g by mouth every 15 minutes as needed for low blood sugar 30 g 3     Lancets (ONETOUCH DELICA PLUS [id number]) MISC U TO TEST QID OR UTD       levothyroxine (SYNTHROID/LEVOTHROID) 50 MCG tablet Take 1 tablet (50 mcg) by mouth daily 90 tablet 1     levothyroxine (SYNTHROID/LEVOTHROID) 75 MCG tablet Take 1 tablet (75 mcg) by mouth daily 90 tablet 3     magnesium hydroxide (MILK OF MAGNESIA) 400 MG/5ML suspension Take 30 mLs by mouth daily as needed for constipation or heartburn (Stop if diarrhea occurs) 355 mL 1     metFORMIN (GLUCOPHAGE-XR) 500 MG 24 hr tablet Take 2 tablets (1,000 mg) by mouth 2 times daily (with meals) 360 tablet 2     Metoprolol Tartrate 75 MG TABS Take 75 mg by mouth 2 times daily 180 tablet 2     mineral oil liquid Take 30 mLs by mouth daily For constipation. Stop if diarrhea occurs. 500 mL 1     Multiple Vitamin (DAILY-STEVE) TABS TK 1 T PO D.       polyethylene glycol (MIRALAX) 17 GM/Dose powder Take 17 g (1 capful) by mouth daily 507 g 3     polyethylene glycol (MIRALAX) packet Take 17 g by  mouth daily 30 packet 3     polyethylene glycol (MIRALAX) powder MIX 17 GRAMS AND DRINK D       Sharps Container MISC 1 each daily 1 each 2     sildenafil (REVATIO) 20 MG tablet Take 3-5 tablets ( mg) by mouth daily as needed (1/2 hour prior to sexual activity) 60 tablet 3     sildenafil (VIAGRA) 100 MG tablet Take 1/2 to 1 full pill by mouth 1/2 hour before sexual activity 10 tablet 3     sitagliptin (JANUVIA) 25 MG tablet Take 1 tablet (25 mg) by mouth daily 90 tablet 1     tacrolimus (GENERIC EQUIVALENT) 1 MG capsule Take 2 capsules (2 mg) by mouth every 12 hours 120 capsule 11     ursodiol (ACTIGALL) 250 MG tablet Take 1 tablet (250 mg) by mouth 2 times daily 180 tablet 3       Review Of Systems:  10 point ROS otherwise negative except as above.     OBJECTIVE:  Vitals and exam deferred due to telephone visit.    Labs reviewed.     Recent Labs   Lab Test 09/25/20  0820 07/03/20  1109    137   POTASSIUM 4.6 5.2   CHLORIDE 104 106   CO2 26 26   ANIONGAP 6 6   * 494*   BUN 26 19   CR 0.98 0.89   YELENA 8.6 8.0*       Lab Results   Component Value Date    WBC 5.0 09/25/2020     Lab Results   Component Value Date    RBC 4.98 09/25/2020     Lab Results   Component Value Date    HGB 14.1 09/25/2020     Lab Results   Component Value Date    HCT 42.5 09/25/2020     No components found for: MCT  Lab Results   Component Value Date    MCV 85 09/25/2020     Lab Results   Component Value Date    MCH 28.3 09/25/2020     Lab Results   Component Value Date    MCHC 33.2 09/25/2020     Lab Results   Component Value Date    RDW 12.3 09/25/2020     Lab Results   Component Value Date     09/25/2020       Color Urine (no units)   Date Value   09/25/2020 Yellow     Appearance Urine (no units)   Date Value   09/25/2020 Clear     Glucose Urine (mg/dL)   Date Value   09/25/2020 >499 (A)     Bilirubin Urine (no units)   Date Value   09/25/2020 Negative     Ketones Urine (mg/dL)   Date Value   09/25/2020 Negative      Specific Gravity Urine (no units)   Date Value   09/25/2020 1.017     pH Urine (pH)   Date Value   09/25/2020 5.0     Protein Albumin Urine (mg/dL)   Date Value   09/25/2020 100 (A)     Nitrite Urine (no units)   Date Value   09/25/2020 Negative     Leukocyte Esterase Urine (no units)   Date Value   09/25/2020 Negative         ASSESSMENT/PLAN:  Pt is a 65 year old male here to establish care.    Loy was seen today for telephone.    Diagnoses and all orders for this visit:    CKD (chronic kidney disease) stage 2, GFR 60-89 ml/min  The patient has relatively preserved eGFR, though his creatinine has risen since his liver transplant from 0.5-0.6 to 0.9-1.0 mg/dl. In the last year he has developed 0.5 g/g albuminuria (with similar total proteinuria). His DM, while not well controlled, is a relatively new diagnosis (post-tx) and is likely likely contributory. His tacrolimus may be the more significant contributor, both to his GFR decline (albeit eGFR still ~80) and to his proteinuria. Other GN beyond such a secondary FSGS is possible, though reassuring his UA with without hematuria (thus not suspecting a nephritic picture), and his clinical picture not fitting with membranous nephropathy nearly as well (only mild proteinuria). Will check monoclonal studies given his age. Renal imaging last year adequately rules out an obstructive explanation given the timeframe of his creatinine and albuminuria changes.   ACEI/ARB considerations below (see HTN); he is not currently on these. He has established with endocrinology - may be reasonable to consider SGLT2 inhibitor, though consideration will have to be given to his hx of liver transplantation.   -     Protein electrophoresis; Future  -     Protein Immunofixation Serum; Future  -     Kappa and lambda light chain; Future    Liver transplanted (H)  Treated with tacrolimus.     Essential hypertension  Unclear if BP controlled or not. The patient thinks that it is, and he  is wary of considering more BP meds. With his mild edema potentially caused by amlodipine, could consider adding (or substituting) and potassium-wasting diuretics, which may also then offer room in his serum potassium level to afford ACEi/ARB therapy, which he is not on. We will either obtain 24-hour ABPM or bring him in for 3 successive AHA-guideline BP measurements given recent discrepancies in measured BPs (we will arrange whichever is easier/safer given COVID protocols).     Electrolytes, acid-base  No issues on recent labs.       Return to clinic in 1-2 months.    Ryan Eaton MD  Instructor  Nephrology  Pager [phone]

## 2020-10-16 NOTE — PROGRESS NOTES
Nephrology Clinic    Telephone Visit    CC: proteinuria     HPI:  Loy Limon is a 65 year old male with pmh of EtOH cirrhosis and HCC s/p liver transplant 12/22/2018, prostate cancer, DM2 diagnosed post-transplant referred to nephrology by Dr. Carballo for proteinuria.     His creatinine has risen from 0.5-0.6 to 0.9 mg/dl. He has developed 0.5 g/g albuminuria over the last year (89 mg/g to 255mg/g to 520mg/g with total proteinuria of 0.7g/g). Last UA without blood or RBCs.     He does not recall ever being on lisinopril or losartan, and I do not see ACEi/ARB in his list of historical meds. He had been on diuretics and spironolactone around time of liver transplant.     His potassium values of late have been high 4s or low 5s.     He recently had BP of 163/91 in the hepatology office, though the pt states he doesn't feel like this was a resting BP. Two days later his BP in his PCP office was 124/69, and he states he felt this was resting.     He recalls his last home-measured BP was: 166 systolic.     He reports mild swelling in his LEs bilat that worsens after a long day working on his feet. No SOB. He does report abdominal swelling since his liver tx. He also reports nausea without emesis but only rare, mild right sided abdominal pain. He relates his nausea to his pill-burden. He reports R back pain. No CP.     NSAIDs: rare to none  PPI: none   He denies kidney stones or UTIs.    Last imaging of kidneys: 3/2019 CT: The kidneys, spleen,  adrenal glands are unremarkable.      was used by phone.     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/28/2018     BPH (benign prostatic hyperplasia)      Cancer (H)     hepatocellualr carcinoma     Cirrhosis of liver (H) 5/8/2018     Diabetes (H)      Enlarged prostate      Hypothyroidism      Inguinal hernia     Repaired with mesh on 12/22/18     Liver lesion 5/8/2018     Liver transplanted (H)  "2018     donor liver transplant     Portal vein thrombosis     on path explant     Postoperative atrial fibrillation (H) 2018     Prostate cancer (H)      TB lung, latent     Treated        Past Surgical History:   Procedure Laterality Date     APPENDECTOMY       COLONOSCOPY      2018     DAVINCI PROSTATECTOMY N/A 1/3/2020    Procedure: Robot-assisted laparoscopic radical prostatectomy, Bladder biopsy;  Surgeon: Kenrick Casiano MD;  [facility]     ENDOSCOPIC RETROGRADE CHOLANGIOPANCREATOGRAM N/A 2018    Procedure: ENDOSCOPIC RETROGRADE CHOLANGIOPANCREATOGRAM, with Billary Sphincterotomy and Billary Stent Placement;  Surgeon: Omero Lawler MD;  [facility]     ENDOSCOPIC RETROGRADE CHOLANGIOPANCREATOGRAM  2019    Procedure: COMBINED ENDOSCOPIC RETROGRADE CHOLANGIOPANCREATOGRAPHY, Bile Duct Stent Exchange;  Surgeon: Omero Lawler MD;  [facility]     ENDOSCOPIC RETROGRADE CHOLANGIOPANCREATOGRAM N/A 2019    Procedure: ENDOSCOPIC RETROGRADE CHOLANGIOPANCREATOGRAPHY, WITH BILIARY STENT REMOVAL AND STONE EXTRACTION;  Surgeon: Omero Lawler MD;  [facility]     HERNIORRHAPHY INGUINAL  2018    Procedure: Herniorrhaphy inguinal;  Surgeon: Emil Echevarria MD;  [facility]     IR LIVER BIOPSY PERCUTANEOUS  2018     LAPAROTOMY EXPLORATORY      per the patient \"for infection in the LLQ\"      TRANSPLANT LIVER RECIPIENT  DONOR N/A 2018    Procedure: TRANSPLANT LIVER RECIPIENT  DONOR;  Surgeon: Emil Echevarria MD;  [facility]        Family History   Problem Relation Age of Onset     Memory loss Mother      Liver Disease Brother      Skin Cancer No family hx of      Melanoma No family hx of    sister has kidney stones     Social History     Tobacco Use     Smoking status: Former Smoker     Packs/day: 0.03     Years: 20.00     Pack years: 0.60     Types: Cigarettes, Cigars     Start date: " 1970     Quit date: 3/14/2018     Years since quittin.5     Smokeless tobacco: Never Used     Tobacco comment: Quit smoking 2018, one cigarette after dinner   Substance Use Topics     Alcohol use: No     Frequency: Never     Comment: Quit drinking 2018     Drug use: No       Social History     Social History Narrative    Born in [place], came [place].     Has worked in [employer], trimming meats        Medications:  Current Outpatient Medications   Medication Sig Dispense Refill     amLODIPine (NORVASC) 10 MG tablet Take 1 tablet (10 mg) by mouth daily 90 tablet 3     aspirin (ASA) 325 MG EC tablet Take 1 tablet (325 mg) by mouth daily 90 tablet 3     bisacodyl (DULCOLAX) 10 MG suppository Place 1 suppository (10 mg) rectally daily For constipation. Stop if diarrhea occurs. 10 suppository 1     blood glucose (NO BRAND SPECIFIED) lancets standard Use to test blood sugar 4 times daily or as directed. 200 each 11     blood glucose (ONETOUCH VERIO IQ) test strip Use to test blood sugar 4 times daily or as directed. 200 strip 11     blood glucose monitoring (NO BRAND SPECIFIED) meter device kit Use to test blood sugar 2 times daily or as directed. 1 kit 0     glucose 40 % (400 mg/mL) gel Take 15-30 g by mouth every 15 minutes as needed for low blood sugar 30 g 3     Lancets (ONETOUCH DELICA PLUS [id number]) MISC U TO TEST QID OR UTD       levothyroxine (SYNTHROID/LEVOTHROID) 50 MCG tablet Take 1 tablet (50 mcg) by mouth daily 90 tablet 1     levothyroxine (SYNTHROID/LEVOTHROID) 75 MCG tablet Take 1 tablet (75 mcg) by mouth daily 90 tablet 3     magnesium hydroxide (MILK OF MAGNESIA) 400 MG/5ML suspension Take 30 mLs by mouth daily as needed for constipation or heartburn (Stop if diarrhea occurs) 355 mL 1     metFORMIN (GLUCOPHAGE-XR) 500 MG 24 hr tablet Take 2 tablets (1,000 mg) by mouth 2 times daily (with meals) 360 tablet 2     Metoprolol Tartrate 75 MG TABS Take 75 mg by  mouth 2 times daily 180 tablet 2     mineral oil liquid Take 30 mLs by mouth daily For constipation. Stop if diarrhea occurs. 500 mL 1     Multiple Vitamin (DAILY-STEVE) TABS TK 1 T PO D.       polyethylene glycol (MIRALAX) 17 GM/Dose powder Take 17 g (1 capful) by mouth daily 507 g 3     polyethylene glycol (MIRALAX) packet Take 17 g by mouth daily 30 packet 3     polyethylene glycol (MIRALAX) powder MIX 17 GRAMS AND DRINK D       Sharps Container MISC 1 each daily 1 each 2     sildenafil (REVATIO) 20 MG tablet Take 3-5 tablets ( mg) by mouth daily as needed (1/2 hour prior to sexual activity) 60 tablet 3     sildenafil (VIAGRA) 100 MG tablet Take 1/2 to 1 full pill by mouth 1/2 hour before sexual activity 10 tablet 3     sitagliptin (JANUVIA) 25 MG tablet Take 1 tablet (25 mg) by mouth daily 90 tablet 1     tacrolimus (GENERIC EQUIVALENT) 1 MG capsule Take 2 capsules (2 mg) by mouth every 12 hours 120 capsule 11     ursodiol (ACTIGALL) 250 MG tablet Take 1 tablet (250 mg) by mouth 2 times daily 180 tablet 3       Review Of Systems:  10 point ROS otherwise negative except as above.     OBJECTIVE:  Vitals and exam deferred due to telephone visit.    Labs reviewed.     Recent Labs   Lab Test 09/25/20  0820 07/03/20  1109    137   POTASSIUM 4.6 5.2   CHLORIDE 104 106   CO2 26 26   ANIONGAP 6 6   * 494*   BUN 26 19   CR 0.98 0.89   YELENA 8.6 8.0*       Lab Results   Component Value Date    WBC 5.0 09/25/2020     Lab Results   Component Value Date    RBC 4.98 09/25/2020     Lab Results   Component Value Date    HGB 14.1 09/25/2020     Lab Results   Component Value Date    HCT 42.5 09/25/2020     No components found for: MCT  Lab Results   Component Value Date    MCV 85 09/25/2020     Lab Results   Component Value Date    MCH 28.3 09/25/2020     Lab Results   Component Value Date    MCHC 33.2 09/25/2020     Lab Results   Component Value Date    RDW 12.3 09/25/2020     Lab Results   Component Value Date      09/25/2020       Color Urine (no units)   Date Value   09/25/2020 Yellow     Appearance Urine (no units)   Date Value   09/25/2020 Clear     Glucose Urine (mg/dL)   Date Value   09/25/2020 >499 (A)     Bilirubin Urine (no units)   Date Value   09/25/2020 Negative     Ketones Urine (mg/dL)   Date Value   09/25/2020 Negative     Specific Gravity Urine (no units)   Date Value   09/25/2020 1.017     pH Urine (pH)   Date Value   09/25/2020 5.0     Protein Albumin Urine (mg/dL)   Date Value   09/25/2020 100 (A)     Nitrite Urine (no units)   Date Value   09/25/2020 Negative     Leukocyte Esterase Urine (no units)   Date Value   09/25/2020 Negative         ASSESSMENT/PLAN:  Pt is a 65 year old male here to establish care.    Loy was seen today for telephone.    Diagnoses and all orders for this visit:    CKD (chronic kidney disease) stage 2, GFR 60-89 ml/min  The patient has relatively preserved eGFR, though his creatinine has risen since his liver transplant from 0.5-0.6 to 0.9-1.0 mg/dl. In the last year he has developed 0.5 g/g albuminuria (with similar total proteinuria). His DM, while not well controlled, is a relatively new diagnosis (post-tx) and is likely likely contributory. His tacrolimus may be the more significant contributor, both to his GFR decline (albeit eGFR still ~80) and to his proteinuria. Other GN beyond such a secondary FSGS is possible, though reassuring his UA with without hematuria (thus not suspecting a nephritic picture), and his clinical picture not fitting with membranous nephropathy nearly as well (only mild proteinuria). Will check monoclonal studies given his age. Renal imaging last year adequately rules out an obstructive explanation given the timeframe of his creatinine and albuminuria changes.   ACEI/ARB considerations below (see HTN); he is not currently on these. He has established with endocrinology - may be reasonable to consider SGLT2 inhibitor, though consideration  will have to be given to his hx of liver transplantation.   -     Protein electrophoresis; Future  -     Protein Immunofixation Serum; Future  -     Kappa and lambda light chain; Future    Liver transplanted (H)  Treated with tacrolimus.     Essential hypertension  Unclear if BP controlled or not. The patient thinks that it is, and he is wary of considering more BP meds. With his mild edema potentially caused by amlodipine, could consider adding (or substituting) and potassium-wasting diuretics, which may also then offer room in his serum potassium level to afford ACEi/ARB therapy, which he is not on. We will either obtain 24-hour ABPM or bring him in for 3 successive AHA-guideline BP measurements given recent discrepancies in measured BPs (we will arrange whichever is easier/safer given COVID protocols).     Electrolytes, acid-base  No issues on recent labs.       Return to clinic in 1-2 months.    Ryan Eaton MD  Instructor  Nephrology  Pager [phone]

## 2020-10-16 NOTE — PROGRESS NOTES
"Loy Limon is a 65 year old male who is being evaluated via a billable telephone visit.      The patient has been notified of following:     \"This telephone visit will be conducted via a call between you and your physician/provider. We have found that certain health care needs can be provided without the need for a physical exam.  This service lets us provide the care you need with a short phone conversation.  If a prescription is necessary we can send it directly to your pharmacy.  If lab work is needed we can place an order for that and you can then stop by our lab to have the test done at a later time.    Telephone visits are billed at different rates depending on your insurance coverage. During this emergency period, for some insurers they may be billed the same as an in-person visit.  Please reach out to your insurance provider with any questions.    If during the course of the call the physician/provider feels a telephone visit is not appropriate, you will not be charged for this service.\"    Patient has given verbal consent for Telephone visit?  Yes    What phone number would you like to be contacted at? [phone]    How would you like to obtain your AVS? Mail a copy    Phone call duration: 45 minutes (8:08am to 8:53am)    Ryan Eaton MD      "

## 2020-10-19 ENCOUNTER — APPOINTMENT (OUTPATIENT)
Dept: INTERPRETER SERVICES | Facility: CLINIC | Age: 65
End: 2020-10-19

## 2020-10-28 ENCOUNTER — DOCUMENTATION ONLY (OUTPATIENT)
Dept: TRANSPLANT | Facility: CLINIC | Age: 65
End: 2020-10-28

## 2020-11-13 DIAGNOSIS — I10 ESSENTIAL HYPERTENSION: Primary | ICD-10-CM

## 2020-11-13 DIAGNOSIS — N18.2 CKD (CHRONIC KIDNEY DISEASE) STAGE 2, GFR 60-89 ML/MIN: ICD-10-CM

## 2020-11-13 DIAGNOSIS — R03.0 ELEVATED BLOOD-PRESSURE READING, WITHOUT DIAGNOSIS OF HYPERTENSION: ICD-10-CM

## 2020-11-18 ENCOUNTER — VIRTUAL VISIT (OUTPATIENT)
Dept: EDUCATION SERVICES | Facility: CLINIC | Age: 65
End: 2020-11-18
Payer: COMMERCIAL

## 2020-11-18 ENCOUNTER — OFFICE VISIT (OUTPATIENT)
Dept: FAMILY MEDICINE | Facility: CLINIC | Age: 65
End: 2020-11-18
Payer: COMMERCIAL

## 2020-11-18 VITALS
SYSTOLIC BLOOD PRESSURE: 141 MMHG | WEIGHT: 188 LBS | OXYGEN SATURATION: 96 % | DIASTOLIC BLOOD PRESSURE: 82 MMHG | BODY MASS INDEX: 29.44 KG/M2 | HEART RATE: 63 BPM

## 2020-11-18 DIAGNOSIS — M79.89 LEG SWELLING: Primary | ICD-10-CM

## 2020-11-18 DIAGNOSIS — E11.9 TYPE 2 DIABETES MELLITUS (H): Primary | ICD-10-CM

## 2020-11-18 DIAGNOSIS — I10 ESSENTIAL HYPERTENSION: ICD-10-CM

## 2020-11-18 PROCEDURE — 99207 PR NO CHARGE NURSE ONLY: CPT | Mod: TEL

## 2020-11-18 PROCEDURE — 99214 OFFICE O/P EST MOD 30 MIN: CPT | Performed by: FAMILY MEDICINE

## 2020-11-18 ASSESSMENT — PAIN SCALES - GENERAL: PAINLEVEL: NO PAIN (0)

## 2020-11-18 NOTE — PATIENT INSTRUCTIONS
[facility] Medication Refill Request Information:  * Please contact your pharmacy regarding ANY request for medication refills.  ** [facility] Prescription Fax = [phone]  * Please allow 3 business days for routine medication refills.  * Please allow 5 business days for controlled substance medication refills.     [facility] Test Result notification information:  *You will be notified with in 7-10 days of your appointment day regarding the results of your test.  If you are on MyChart you will be notified as soon as the provider has reviewed the results and signed off on them.    [facility]: [phone]     Endocrinology [phone] (4th Floor [facility] Building)

## 2020-11-18 NOTE — NURSING NOTE
Chief Complaint   Patient presents with     RECHECK     pt would like to recheck medications      Lucy Freire, EMT at 7:58 AM sign on 11/18/2020

## 2020-11-18 NOTE — PROGRESS NOTES
SUBJECTIVE:    Pt is a 65 year old male with pmh of     Patient Active Problem List   Diagnosis     Cirrhosis of liver (H)     Liver lesion     HCC (hepatocellular carcinoma) (H)     Liver transplant recipient (H)     Immunosuppressed status (H)     Hypervolemia     Atrial fibrillation with rapid ventricular response (H)     Hematuria     Bilateral wheezing     Hypoxia     Hyperglycemia     Pre-diabetes     Essential hypertension     Constipation     Biliary stricture of transplanted liver (H)     Drug-induced diabetes mellitus (H)     Muscle tension dysphonia     Laennec's cirrhosis (H)     Elevated ferritin     Inguinal hernia of right side with obstruction and without gangrene     Portal hypertension (H)     Right sided abdominal pain     Tobacco use disorder     Prostate cancer (H)     Diabetes mellitus, type 2 (H)       who is here for evaluation of had concerns including RECHECK (pt would like to recheck medications ).    Here in f/u  Ipad   C/o:  Rvwd my last visit, interal visits, upcoming visits  He has trouble getting glucometer to work, I've messaged diabetes clinic who will call him in to check it  Legs puffy if on feet a lot  Htn: rvwd trend, meds  He had questions about ursodiol we reviewed   Is on Nvasc which can cause some leg swelling    For swelling it is chronic, dependent, rvwd most recent abdomen imaging, GI labs, hgb, echo, thyroid    No new orders needed by me     Past Medical History:   Diagnosis Date     Anemia      Biliary stricture of transplanted liver (H) 2018     BPH (benign prostatic hyperplasia)      Cancer (H)     hepatocellualr carcinoma     Cirrhosis of liver (H) 2018     Diabetes (H)      Enlarged prostate      Hypothyroidism      Inguinal hernia     Repaired with mesh on 18     Liver lesion 2018     Liver transplanted (H) 2018     donor liver transplant     Portal vein thrombosis     on path explant     Postoperative atrial  "fibrillation (H) 2018     Prostate cancer (H)      TB lung, latent     Treated      Past Surgical History:   Procedure Laterality Date     APPENDECTOMY       COLONOSCOPY      2018     DAVINCI PROSTATECTOMY N/A 1/3/2020    Procedure: Robot-assisted laparoscopic radical prostatectomy, Bladder biopsy;  Surgeon: Kenrick Casiano MD;  Location: [facility]     ENDOSCOPIC RETROGRADE CHOLANGIOPANCREATOGRAM N/A 2018    Procedure: ENDOSCOPIC RETROGRADE CHOLANGIOPANCREATOGRAM, with Billary Sphincterotomy and Billary Stent Placement;  Surgeon: Omero Lawler MD;  Location: [facility] OR     ENDOSCOPIC RETROGRADE CHOLANGIOPANCREATOGRAM  2019    Procedure: COMBINED ENDOSCOPIC RETROGRADE CHOLANGIOPANCREATOGRAPHY, Bile Duct Stent Exchange;  Surgeon: Omero Lawler MD;  Location: [facility]     ENDOSCOPIC RETROGRADE CHOLANGIOPANCREATOGRAM N/A 2019    Procedure: ENDOSCOPIC RETROGRADE CHOLANGIOPANCREATOGRAPHY, WITH BILIARY STENT REMOVAL AND STONE EXTRACTION;  Surgeon: Omero Lawler MD;  Location: [facility] OR     HERNIORRHAPHY INGUINAL  2018    Procedure: Herniorrhaphy inguinal;  Surgeon: Emil Echevarria MD;  Location: [facility] OR     IR LIVER BIOPSY PERCUTANEOUS  2018     LAPAROTOMY EXPLORATORY      per the patient \"for infection in the LLQ\"      TRANSPLANT LIVER RECIPIENT  DONOR N/A 2018    Procedure: TRANSPLANT LIVER RECIPIENT  DONOR;  Surgeon: Emil Echevarria MD;  Location: [facility] OR     Current Outpatient Medications   Medication     amLODIPine (NORVASC) 10 MG tablet     aspirin (ASA) 325 MG EC tablet     bisacodyl (DULCOLAX) 10 MG suppository     blood glucose (NO BRAND SPECIFIED) lancets standard     blood glucose (ONETOUCH VERIO IQ) test strip     blood glucose monitoring (NO BRAND SPECIFIED) meter device kit     glucose 40 % (400 mg/mL) gel     Lancets (ONETOUCH DELICA PLUS [id number]) MISC     levothyroxine (SYNTHROID/LEVOTHROID) 75 MCG tablet     magnesium " hydroxide (MILK OF MAGNESIA) 400 MG/5ML suspension     metFORMIN (GLUCOPHAGE-XR) 500 MG 24 hr tablet     Metoprolol Tartrate 75 MG TABS     mineral oil liquid     Multiple Vitamin (DAILY-STEVE) TABS     polyethylene glycol (MIRALAX) 17 GM/Dose powder     polyethylene glycol (MIRALAX) packet     polyethylene glycol (MIRALAX) powder     Sharps Container MISC     sildenafil (REVATIO) 20 MG tablet     sildenafil (VIAGRA) 100 MG tablet     sitagliptin (JANUVIA) 25 MG tablet     tacrolimus (GENERIC EQUIVALENT) 1 MG capsule     ursodiol (ACTIGALL) 250 MG tablet     No current facility-administered medications for this visit.      No Known Allergies        Current Outpatient Medications   Medication Sig Dispense Refill     amLODIPine (NORVASC) 10 MG tablet Take 1 tablet (10 mg) by mouth daily 90 tablet 3     aspirin (ASA) 325 MG EC tablet Take 1 tablet (325 mg) by mouth daily 90 tablet 3     bisacodyl (DULCOLAX) 10 MG suppository Place 1 suppository (10 mg) rectally daily For constipation. Stop if diarrhea occurs. 10 suppository 1     blood glucose (NO BRAND SPECIFIED) lancets standard Use to test blood sugar 4 times daily or as directed. 200 each 11     blood glucose (ONETOUCH VERIO IQ) test strip Use to test blood sugar 4 times daily or as directed. 200 strip 11     blood glucose monitoring (NO BRAND SPECIFIED) meter device kit Use to test blood sugar 2 times daily or as directed. 1 kit 0     glucose 40 % (400 mg/mL) gel Take 15-30 g by mouth every 15 minutes as needed for low blood sugar 30 g 3     Lancets (ONETOUCH DELICA PLUS [id number]) MISC U TO TEST QID OR UTD       levothyroxine (SYNTHROID/LEVOTHROID) 75 MCG tablet Take 1 tablet (75 mcg) by mouth daily 90 tablet 3     magnesium hydroxide (MILK OF MAGNESIA) 400 MG/5ML suspension Take 30 mLs by mouth daily as needed for constipation or heartburn (Stop if diarrhea occurs) 355 mL 1     metFORMIN (GLUCOPHAGE-XR) 500 MG 24 hr tablet Take 2 tablets (1,000 mg) by mouth 2  times daily (with meals) 360 tablet 2     Metoprolol Tartrate 75 MG TABS Take 75 mg by mouth 2 times daily 180 tablet 2     mineral oil liquid Take 30 mLs by mouth daily For constipation. Stop if diarrhea occurs. 500 mL 1     Multiple Vitamin (DAILY-STEVE) TABS TK 1 T PO D.       polyethylene glycol (MIRALAX) 17 GM/Dose powder Take 17 g (1 capful) by mouth daily 507 g 3     polyethylene glycol (MIRALAX) packet Take 17 g by mouth daily 30 packet 3     polyethylene glycol (MIRALAX) powder MIX 17 GRAMS AND DRINK D       Sharps Container MISC 1 each daily 1 each 2     sildenafil (REVATIO) 20 MG tablet Take 3-5 tablets ( mg) by mouth daily as needed (1/2 hour prior to sexual activity) 60 tablet 3     sildenafil (VIAGRA) 100 MG tablet Take 1/2 to 1 full pill by mouth 1/2 hour before sexual activity 10 tablet 3     sitagliptin (JANUVIA) 25 MG tablet Take 1 tablet (25 mg) by mouth daily 90 tablet 1     tacrolimus (GENERIC EQUIVALENT) 1 MG capsule Take 2 capsules (2 mg) by mouth every 12 hours 120 capsule 11     ursodiol (ACTIGALL) 250 MG tablet Take 1 tablet (250 mg) by mouth 2 times daily 180 tablet 3       Social History     Tobacco Use     Smoking status: Former Smoker     Packs/day: 0.03     Years: 20.00     Pack years: 0.60     Types: Cigarettes, Cigars     Start date: 1970     Quit date: 3/14/2018     Years since quittin.6     Smokeless tobacco: Never Used     Tobacco comment: Quit smoking 2018, one cigarette after dinner   Substance Use Topics     Alcohol use: No     Frequency: Never     Comment: Quit drinking 2018     Drug use: No         OBJECTIVE:  BP (!) 141/82   Pulse 63   Wt 85.3 kg (188 lb)   SpO2 96%   BMI 29.44 kg/m    GENERAL APPEARANCE: Alert, no acute distress  RESP: lungs clear to auscultation   CV: normal rate, regular rhythm, no murmur or gallop  ABDOMEN: soft, no organomegaly, masses or tenderness  MS: extremities normal, trace edema both legs seen in am  NEURO: Alert,  oriented, speech and mentation normal  PSYCHE: mentation appears normal, affect and mood normal    ASSESSMENT/PLAN:    Edema: likley combination venous insufficiency and norvasc, resume compressino hose, rx'd  Htn: cont as is, sees renal soon  DM: he'll work w/ DM clinic on use of glucometer  Keep upcoming appt's, see me three mo  1/2 hr visit over half couns/coord care we spent time discussing his problems at length all via interprer    SLOAN REEDER MD

## 2020-11-19 NOTE — PROGRESS NOTES
"DIABETES EDUCATOR NOTE:    Purpose of today's visit: meter teaching    Subjective: \"I have used a meter before.  I got a new one and cannot get it to work.\"    Objective: pt was called through  services    Assessment: type 2 diabetes, no way to check glucose    Intervention:   He has a One Touch Verio meter, at first he was stuck in set up mode.  We got though that and he tried to do two tests.  He never got to the screen with the blood drop indicating it was ready to do a test.     Plan:  This will require an in person visit.  He will go back to the pharmacy he purchased the meter from.    Will check back with him and make sure it works.    Any diabetes medication dose changes were made via the CDE Protocol and Collaborative Practice Agreement. A copy of this encounter was provided to the patient's referring    No charges were submitted for this phone call.      Time: 30 minutes  "

## 2020-12-03 ENCOUNTER — APPOINTMENT (OUTPATIENT)
Dept: INTERPRETER SERVICES | Facility: CLINIC | Age: 65
End: 2020-12-03

## 2020-12-18 ENCOUNTER — VIRTUAL VISIT (OUTPATIENT)
Dept: NEPHROLOGY | Facility: CLINIC | Age: 65
End: 2020-12-18
Attending: STUDENT IN AN ORGANIZED HEALTH CARE EDUCATION/TRAINING PROGRAM
Payer: COMMERCIAL

## 2020-12-18 ENCOUNTER — PRE VISIT (OUTPATIENT)
Dept: UROLOGY | Facility: CLINIC | Age: 65
End: 2020-12-18

## 2020-12-18 ENCOUNTER — ALLIED HEALTH/NURSE VISIT (OUTPATIENT)
Dept: INTERPRETER SERVICES | Facility: CLINIC | Age: 65
End: 2020-12-18

## 2020-12-18 DIAGNOSIS — R80.1 PERSISTENT PROTEINURIA: Primary | ICD-10-CM

## 2020-12-18 PROCEDURE — 999N001193 HC VIDEO/TELEPHONE VISIT; NO CHARGE

## 2020-12-18 PROCEDURE — T1013 SIGN LANG/ORAL INTERPRETER: HCPCS | Mod: U4,TEL

## 2020-12-18 PROCEDURE — 99214 OFFICE O/P EST MOD 30 MIN: CPT | Mod: 95 | Performed by: STUDENT IN AN ORGANIZED HEALTH CARE EDUCATION/TRAINING PROGRAM

## 2020-12-18 RX ORDER — LISINOPRIL 10 MG/1
10 TABLET ORAL DAILY
Qty: 90 TABLET | Refills: 3 | Status: SHIPPED | OUTPATIENT
Start: 2020-12-18 | End: 2021-02-18

## 2020-12-18 NOTE — PROGRESS NOTES
"Loy Limon is a 65 year old male who is being evaluated via a billable telephone visit.      The patient has been notified of following:     \"This telephone visit will be conducted via a call between you and your physician/provider. We have found that certain health care needs can be provided without the need for a physical exam.  This service lets us provide the care you need with a short phone conversation.  If a prescription is necessary we can send it directly to your pharmacy.  If lab work is needed we can place an order for that and you can then stop by our lab to have the test done at a later time.    Telephone visits are billed at different rates depending on your insurance coverage. During this emergency period, for some insurers they may be billed the same as an in-person visit.  Please reach out to your insurance provider with any questions.    If during the course of the call the physician/provider feels a telephone visit is not appropriate, you will not be charged for this service.\"    Patient has given verbal consent for Telephone visit?  Yes    What phone number would you like to be contacted at? [phone]    [employer]  option 3 option 1  How would you like to obtain your AVS? Mail    Phone call duration: 30 minutes (9:00am to 9:30am)    Ryan Eaton MD        "

## 2020-12-18 NOTE — LETTER
"12/18/2020       RE: Loy Limon  [address]  [place] [address]     Dear Colleague,    Thank you for referring your patient, Loy Limon, to the [facility] [place] at [facility]. Please see a copy of my visit note below.    Loy Limon is a 65 year old male who is being evaluated via a billable telephone visit.      The patient has been notified of following:     \"This telephone visit will be conducted via a call between you and your physician/provider. We have found that certain health care needs can be provided without the need for a physical exam.  This service lets us provide the care you need with a short phone conversation.  If a prescription is necessary we can send it directly to your pharmacy.  If lab work is needed we can place an order for that and you can then stop by our lab to have the test done at a later time.    Telephone visits are billed at different rates depending on your insurance coverage. During this emergency period, for some insurers they may be billed the same as an in-person visit.  Please reach out to your insurance provider with any questions.    If during the course of the call the physician/provider feels a telephone visit is not appropriate, you will not be charged for this service.\"    Patient has given verbal consent for Telephone visit?  Yes    What phone number would you like to be contacted at? [phone]    [employer]  option 3 option 1  How would you like to obtain your AVS? Mail    Phone call duration: 30 minutes (9:00am to 9:30am)    Ryan Eaton MD          Nephrology Clinic    Telephone Visit    SUBJECTIVE:   Loy Limon is a 65 year old year old male with pmh of EtOH cirrhosis and HCC s/p liver transplant 12/22/2018, prostate cancer, DM2 diagnosed post-transplant, and proteinuria here for: follow-up of his proteinuria    He has not had labs " since his last visit with me. He has had 3 in-person BPs during his one recent PCP visit. Those BPs were 152/83, 148/82, and 141/82 (in order). He states he measures BP at home occasionally and sees 160s at times for SBP. He also notes that he has been told my many providers that his BP is high.     No LE swelling currently, SOB, CP, abd pain, N/V/D. He feels well and has no complaints today.      used for the visit.      Review of systems:  4 point ROS negative except as noted above.    Past Medical History:   Diagnosis Date     Anemia      Biliary stricture of transplanted liver (H) 2018     BPH (benign prostatic hyperplasia)      Cancer (H)     hepatocellualr carcinoma     Cirrhosis of liver (H) 2018     Diabetes (H)      Enlarged prostate      Hypothyroidism      Inguinal hernia     Repaired with mesh on 18     Liver lesion 2018     Liver transplanted (H) 2018     donor liver transplant     Portal vein thrombosis     on path explant     Postoperative atrial fibrillation (H) 2018     Prostate cancer (H)      TB lung, latent     Treated              amLODIPine (NORVASC) 10 MG tablet, Take 1 tablet (10 mg) by mouth daily       aspirin (ASA) 325 MG EC tablet, Take 1 tablet (325 mg) by mouth daily       bisacodyl (DULCOLAX) 10 MG suppository, Place 1 suppository (10 mg) rectally daily For constipation. Stop if diarrhea occurs.       magnesium hydroxide (MILK OF MAGNESIA) 400 MG/5ML suspension, Take 30 mLs by mouth daily as needed for constipation or heartburn (Stop if diarrhea occurs)       metFORMIN (GLUCOPHAGE-XR) 500 MG 24 hr tablet, Take 2 tablets (1,000 mg) by mouth 2 times daily (with meals)       Metoprolol Tartrate 75 MG TABS, Take 75 mg by mouth 2 times daily       polyethylene glycol (MIRALAX) powder, MIX 17 GRAMS AND DRINK D       sitagliptin (JANUVIA) 25 MG tablet, Take 1 tablet (25 mg) by mouth daily       tacrolimus (GENERIC EQUIVALENT) 1 MG capsule,  Take 2 capsules (2 mg) by mouth every 12 hours       ursodiol (ACTIGALL) 250 MG tablet, Take 1 tablet (250 mg) by mouth 2 times daily       blood glucose (NO BRAND SPECIFIED) lancets standard, Use to test blood sugar 4 times daily or as directed. (Patient not taking: Reported on 12/18/2020)       blood glucose (ONETOUCH VERIO IQ) test strip, Use to test blood sugar 4 times daily or as directed. (Patient not taking: Reported on 12/18/2020)       blood glucose monitoring (NO BRAND SPECIFIED) meter device kit, Use to test blood sugar 2 times daily or as directed. (Patient not taking: Reported on 12/18/2020)       glucose 40 % (400 mg/mL) gel, Take 15-30 g by mouth every 15 minutes as needed for low blood sugar (Patient not taking: Reported on 12/18/2020)       Lancets (ONETOUCH DELICA PLUS [id number]) MISC, U TO TEST QID OR UTD       levothyroxine (SYNTHROID/LEVOTHROID) 75 MCG tablet, Take 1 tablet (75 mcg) by mouth daily (Patient not taking: Reported on 12/18/2020)       mineral oil liquid, Take 30 mLs by mouth daily For constipation. Stop if diarrhea occurs. (Patient not taking: Reported on 12/18/2020)       Multiple Vitamin (DAILY-STEVE) TABS, TK 1 T PO D.       polyethylene glycol (MIRALAX) 17 GM/Dose powder, Take 17 g (1 capful) by mouth daily (Patient not taking: Reported on 12/18/2020)       polyethylene glycol (MIRALAX) packet, Take 17 g by mouth daily (Patient not taking: Reported on 12/18/2020)       Sharps Container MISC, 1 each daily (Patient not taking: Reported on 12/18/2020)       sildenafil (REVATIO) 20 MG tablet, Take 3-5 tablets ( mg) by mouth daily as needed (1/2 hour prior to sexual activity) (Patient not taking: Reported on 12/18/2020)       sildenafil (VIAGRA) 100 MG tablet, Take 1/2 to 1 full pill by mouth 1/2 hour before sexual activity (Patient not taking: Reported on 12/18/2020)    No current facility-administered medications on file prior to visit.        OBJECTIVE:  Vitals and exam  deferred due to telephone visit.     Labs reviewed.     Recent Labs   Lab Test 09/25/20  0820 07/03/20  1109    137   POTASSIUM 4.6 5.2   CHLORIDE 104 106   CO2 26 26   ANIONGAP 6 6   * 494*   BUN 26 19   CR 0.98 0.89   YELENA 8.6 8.0*       Lab Results   Component Value Date    WBC 5.0 09/25/2020     Lab Results   Component Value Date    RBC 4.98 09/25/2020     Lab Results   Component Value Date    HGB 14.1 09/25/2020     Lab Results   Component Value Date    HCT 42.5 09/25/2020     No components found for: MCT  Lab Results   Component Value Date    MCV 85 09/25/2020     Lab Results   Component Value Date    MCH 28.3 09/25/2020     Lab Results   Component Value Date    MCHC 33.2 09/25/2020     Lab Results   Component Value Date    RDW 12.3 09/25/2020     Lab Results   Component Value Date     09/25/2020       Color Urine (no units)   Date Value   09/25/2020 Yellow     Appearance Urine (no units)   Date Value   09/25/2020 Clear     Glucose Urine (mg/dL)   Date Value   09/25/2020 >499 (A)     Bilirubin Urine (no units)   Date Value   09/25/2020 Negative     Ketones Urine (mg/dL)   Date Value   09/25/2020 Negative     Specific Gravity Urine (no units)   Date Value   09/25/2020 1.017     pH Urine (pH)   Date Value   09/25/2020 5.0     Protein Albumin Urine (mg/dL)   Date Value   09/25/2020 100 (A)     Nitrite Urine (no units)   Date Value   09/25/2020 Negative     Leukocyte Esterase Urine (no units)   Date Value   09/25/2020 Negative           ASSESSMENT and PLAN:   Loy was seen today for telephone.    Diagnoses and all orders for this visit:    Persistent proteinuria  Hypertension, essential   It appears clear that the patient has HTN, and thus in light of this and his sub-nephrotic range proteinuria and CKD stage 2 that is most like due to his CNI therapy for his liver transplant (though he still needs to have his ordered monoclonal labs drawn) he and I discussed starting lisinopril at 10mg  daily today. The patient will begin this med shortly, and will take his BP at home. He will have labs drawn next week and then again in 3 weeks to follow-up this ACEi start. He will measure home BPs and we will follow-up with him by phone to determine if a dose increase is safe and necessary.   -     CBC with platelets; Future  -     Renal panel; Standing  -     Protein  random urine with Creat Ratio; Future  -     lisinopril (ZESTRIL) 10 MG tablet; Take 1 tablet (10 mg) by mouth daily      Return to clinic in 4 months for full visit.     Ryan Eaton MD  Instructor  Nephrology  Pager: [phone]        Again, thank you for allowing me to participate in the care of your patient.      Sincerely,    Ryan Eaton MD

## 2020-12-18 NOTE — TELEPHONE ENCOUNTER
Chief Complaint : Follow up - 3 Months    Hx/Sx: Prostate cancer (s/p RALP), urinary incontinence, ED    Records/Orders: PSA scheduled for 12/23    Pt Contacted: N/a    At Rooming: Telephone, IHSAN/CONCEPCIÓN Tavares, EMT

## 2020-12-18 NOTE — PROGRESS NOTES
Nephrology Clinic    Telephone Visit    SUBJECTIVE:   Loy Limon is a 65 year old year old male with pmh of EtOH cirrhosis and HCC s/p liver transplant 2018, prostate cancer, DM2 diagnosed post-transplant, and proteinuria here for: follow-up of his proteinuria    He has not had labs since his last visit with me. He has had 3 in-person BPs during his one recent PCP visit. Those BPs were 152/83, 148/82, and 141/82 (in order). He states he measures BP at home occasionally and sees 160s at times for SBP. He also notes that he has been told my many providers that his BP is high.     No LE swelling currently, SOB, CP, abd pain, N/V/D. He feels well and has no complaints today.      used for the visit.      Review of systems:  4 point ROS negative except as noted above.    Past Medical History:   Diagnosis Date     Anemia      Biliary stricture of transplanted liver (H) 2018     BPH (benign prostatic hyperplasia)      Cancer (H)     hepatocellualr carcinoma     Cirrhosis of liver (H) 2018     Diabetes (H)      Enlarged prostate      Hypothyroidism      Inguinal hernia     Repaired with mesh on 18     Liver lesion 2018     Liver transplanted (H) 2018     donor liver transplant     Portal vein thrombosis     on path explant     Postoperative atrial fibrillation (H) 2018     Prostate cancer (H)      TB lung, latent     Treated              amLODIPine (NORVASC) 10 MG tablet, Take 1 tablet (10 mg) by mouth daily       aspirin (ASA) 325 MG EC tablet, Take 1 tablet (325 mg) by mouth daily       bisacodyl (DULCOLAX) 10 MG suppository, Place 1 suppository (10 mg) rectally daily For constipation. Stop if diarrhea occurs.       magnesium hydroxide (MILK OF MAGNESIA) 400 MG/5ML suspension, Take 30 mLs by mouth daily as needed for constipation or heartburn (Stop if diarrhea occurs)       metFORMIN (GLUCOPHAGE-XR) 500 MG 24 hr tablet, Take 2 tablets (1,000 mg) by mouth 2  times daily (with meals)       Metoprolol Tartrate 75 MG TABS, Take 75 mg by mouth 2 times daily       polyethylene glycol (MIRALAX) powder, MIX 17 GRAMS AND DRINK D       sitagliptin (JANUVIA) 25 MG tablet, Take 1 tablet (25 mg) by mouth daily       tacrolimus (GENERIC EQUIVALENT) 1 MG capsule, Take 2 capsules (2 mg) by mouth every 12 hours       ursodiol (ACTIGALL) 250 MG tablet, Take 1 tablet (250 mg) by mouth 2 times daily       blood glucose (NO BRAND SPECIFIED) lancets standard, Use to test blood sugar 4 times daily or as directed. (Patient not taking: Reported on 12/18/2020)       blood glucose (ONETOUCH VERIO IQ) test strip, Use to test blood sugar 4 times daily or as directed. (Patient not taking: Reported on 12/18/2020)       blood glucose monitoring (NO BRAND SPECIFIED) meter device kit, Use to test blood sugar 2 times daily or as directed. (Patient not taking: Reported on 12/18/2020)       glucose 40 % (400 mg/mL) gel, Take 15-30 g by mouth every 15 minutes as needed for low blood sugar (Patient not taking: Reported on 12/18/2020)       Lancets (ONETOUCH DELICA PLUS [id number]) MISC, U TO TEST QID OR UTD       levothyroxine (SYNTHROID/LEVOTHROID) 75 MCG tablet, Take 1 tablet (75 mcg) by mouth daily (Patient not taking: Reported on 12/18/2020)       mineral oil liquid, Take 30 mLs by mouth daily For constipation. Stop if diarrhea occurs. (Patient not taking: Reported on 12/18/2020)       Multiple Vitamin (DAILY-STEVE) TABS, TK 1 T PO D.       polyethylene glycol (MIRALAX) 17 GM/Dose powder, Take 17 g (1 capful) by mouth daily (Patient not taking: Reported on 12/18/2020)       polyethylene glycol (MIRALAX) packet, Take 17 g by mouth daily (Patient not taking: Reported on 12/18/2020)       Sharps Container MISC, 1 each daily (Patient not taking: Reported on 12/18/2020)       sildenafil (REVATIO) 20 MG tablet, Take 3-5 tablets ( mg) by mouth daily as needed (1/2 hour prior to sexual activity) (Patient  not taking: Reported on 12/18/2020)       sildenafil (VIAGRA) 100 MG tablet, Take 1/2 to 1 full pill by mouth 1/2 hour before sexual activity (Patient not taking: Reported on 12/18/2020)    No current facility-administered medications on file prior to visit.        OBJECTIVE:  Vitals and exam deferred due to telephone visit.     Labs reviewed.     Recent Labs   Lab Test 09/25/20  0820 07/03/20  1109    137   POTASSIUM 4.6 5.2   CHLORIDE 104 106   CO2 26 26   ANIONGAP 6 6   * 494*   BUN 26 19   CR 0.98 0.89   YELENA 8.6 8.0*       Lab Results   Component Value Date    WBC 5.0 09/25/2020     Lab Results   Component Value Date    RBC 4.98 09/25/2020     Lab Results   Component Value Date    HGB 14.1 09/25/2020     Lab Results   Component Value Date    HCT 42.5 09/25/2020     No components found for: MCT  Lab Results   Component Value Date    MCV 85 09/25/2020     Lab Results   Component Value Date    MCH 28.3 09/25/2020     Lab Results   Component Value Date    MCHC 33.2 09/25/2020     Lab Results   Component Value Date    RDW 12.3 09/25/2020     Lab Results   Component Value Date     09/25/2020       Color Urine (no units)   Date Value   09/25/2020 Yellow     Appearance Urine (no units)   Date Value   09/25/2020 Clear     Glucose Urine (mg/dL)   Date Value   09/25/2020 >499 (A)     Bilirubin Urine (no units)   Date Value   09/25/2020 Negative     Ketones Urine (mg/dL)   Date Value   09/25/2020 Negative     Specific Gravity Urine (no units)   Date Value   09/25/2020 1.017     pH Urine (pH)   Date Value   09/25/2020 5.0     Protein Albumin Urine (mg/dL)   Date Value   09/25/2020 100 (A)     Nitrite Urine (no units)   Date Value   09/25/2020 Negative     Leukocyte Esterase Urine (no units)   Date Value   09/25/2020 Negative           ASSESSMENT and PLAN:   Loy was seen today for telephone.    Diagnoses and all orders for this visit:    Persistent proteinuria  Hypertension, essential   It appears  clear that the patient has HTN, and thus in light of this and his sub-nephrotic range proteinuria and CKD stage 2 that is most like due to his CNI therapy for his liver transplant (though he still needs to have his ordered monoclonal labs drawn) he and I discussed starting lisinopril at 10mg daily today. The patient will begin this med shortly, and will take his BP at home. He will have labs drawn next week and then again in 3 weeks to follow-up this ACEi start. He will measure home BPs and we will follow-up with him by phone to determine if a dose increase is safe and necessary.   -     CBC with platelets; Future  -     Renal panel; Standing  -     Protein  random urine with Creat Ratio; Future  -     lisinopril (ZESTRIL) 10 MG tablet; Take 1 tablet (10 mg) by mouth daily      Return to clinic in 4 months for full visit.     Ryan Eaton MD  Instructor  Nephrology  Pager: [phone]

## 2020-12-29 ENCOUNTER — VIRTUAL VISIT (OUTPATIENT)
Dept: UROLOGY | Facility: CLINIC | Age: 65
End: 2020-12-29
Payer: COMMERCIAL

## 2020-12-29 DIAGNOSIS — C61 PROSTATE CANCER (H): Primary | ICD-10-CM

## 2020-12-29 PROCEDURE — 99214 OFFICE O/P EST MOD 30 MIN: CPT | Mod: 95 | Performed by: PHYSICIAN ASSISTANT

## 2020-12-29 RX ORDER — ALFUZOSIN HYDROCHLORIDE 10 MG/1
TABLET, EXTENDED RELEASE ORAL
COMMUNITY
Start: 2020-12-04 | End: 2022-03-02

## 2020-12-29 ASSESSMENT — PAIN SCALES - GENERAL: PAINLEVEL: NO PAIN (0)

## 2020-12-29 NOTE — PATIENT INSTRUCTIONS
PLAN:   - Will send message to Dr. Tavares to see if someone can contact you to help you use your glucometer (message sent).    - Get PSA blood test checked on 2/1/20 (you have a lab appointment on that date)   - Will schedule a Urology nurse visit on 2/1/20 to check to make sure you are emptying your bladder all the way (bladderscan)   - Return in 4 months for a virtual visit with a PSA first.     [facility]

## 2020-12-29 NOTE — LETTER
"12/29/2020       RE: Loy Limon  [address]  [facility] [place] [address]     Dear Colleague,    Thank you for referring your patient, Loy Limon, to the [facility] [place] [facility]. Please see a copy of my visit note below.    Patient was checked in for today's appointment with Kerri Pascual PA-C with the help of  Services.    Gilma Lee CMA     Loy Limon is a 65 year old male who is being evaluated via a billable telephone visit.      The patient has been notified of following:     \"This telephone visit will be conducted via a call between you and your physician/provider. We have found that certain health care needs can be provided without the need for a physical exam.  This service lets us provide the care you need with a short phone conversation.  If a prescription is necessary we can send it directly to your pharmacy.  If lab work is needed we can place an order for that and you can then stop by our lab to have the test done at a later time.    Telephone visits are billed at different rates depending on your insurance coverage. During this emergency period, for some insurers they may be billed the same as an in-person visit.  Please reach out to your insurance provider with any questions.    If during the course of the call the physician/provider feels a telephone visit is not appropriate, you will not be charged for this service.\"    Patient has given verbal consent for Telephone visit?  Yes    What phone number would you like to be contacted at? [phone]    How would you like to obtain your AVS? Mail a copy    HPI: Mr. Loy Limon is a 65 year old year old male presenting today for evaluation of chief complaint(s): RECHECK (Prostate cancer)    Mr. Limon has PMH significant for HTN, DM, and immunosuppression 2/2 liver transplant.  More recently he also developed prostate cancer (4/26/19 - " "PSA 5.51ng/dL) and is now s/p RALP with Dr. Casiano on 1/3/20, final path: [place] 3+4=7, neg margins, [id number].      He was last \"seen\" in Urology on 9/29/20 for a virtual visit.  At that time his PSA was undetectable.  He was struggling with back pain due to arthritis.  He also was needing to strain to empty his bladder.  His urine was malodorous but a recent urinalysis on 9/25/20 showed only protein 100mg/dL and glucose >499mg/dL.   He was wearing 1ppd, mostly for security.  He wasn't checking his blood sugars.     Today he returns for a telephone visit and a professional  is being used.  He has no recent labs but is due for a lab visit on 2/1/20.  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     URINARY Symptoms:  With coughing, there will be a gush of fluid.  With sitting on a chair, will leak a drop.  With sleeping, he doesn't leak at all but needs to wake every hour to void (whereas before surgery he woke only once or twice per night).  This nocturia is new since the prostatectomy.  With voiding --> he needs to push \"a little bit\".  He feels he probably empties.  No dysuria.  But urine that comes out is foamy.  Wears 2ppd (when staying home all day) and 3ppd (when he leaves the home and is more active).  Did see a pelvic floor therapist initially but got frustrated because he wasn't able to squeeze his muscles properly.  Currently he is trying to stay home to protect himself from COVID.      - Blood sugar recently -->  His glucometer broke and he got a new machine but he doesn't know how to use it.  He can't make it work    Current Outpatient Medications   Medication Sig Dispense Refill     alfuzosin ER (UROXATRAL) 10 MG 24 hr tablet        amLODIPine (NORVASC) 10 MG tablet Take 1 tablet (10 mg) by mouth daily 90 tablet 3     aspirin (ASA) 325 MG EC tablet Take 1 tablet (325 mg) by mouth daily 90 tablet 3     " bisacodyl (DULCOLAX) 10 MG suppository Place 1 suppository (10 mg) rectally daily For constipation. Stop if diarrhea occurs. 10 suppository 1     blood glucose (NO BRAND SPECIFIED) lancets standard Use to test blood sugar 4 times daily or as directed. 200 each 11     blood glucose (ONETOUCH VERIO IQ) test strip Use to test blood sugar 4 times daily or as directed. 200 strip 11     blood glucose monitoring (NO BRAND SPECIFIED) meter device kit Use to test blood sugar 2 times daily or as directed. 1 kit 0     glucose 40 % (400 mg/mL) gel Take 15-30 g by mouth every 15 minutes as needed for low blood sugar 30 g 3     Lancets (ONETOUCH DELICA PLUS [id number]) MISC U TO TEST QID OR UTD       levothyroxine (SYNTHROID/LEVOTHROID) 75 MCG tablet Take 1 tablet (75 mcg) by mouth daily 90 tablet 3     lisinopril (ZESTRIL) 10 MG tablet Take 1 tablet (10 mg) by mouth daily 90 tablet 3     magnesium hydroxide (MILK OF MAGNESIA) 400 MG/5ML suspension Take 30 mLs by mouth daily as needed for constipation or heartburn (Stop if diarrhea occurs) 355 mL 1     metFORMIN (GLUCOPHAGE-XR) 500 MG 24 hr tablet Take 2 tablets (1,000 mg) by mouth 2 times daily (with meals) 360 tablet 2     Metoprolol Tartrate 75 MG TABS Take 75 mg by mouth 2 times daily 180 tablet 2     mineral oil liquid Take 30 mLs by mouth daily For constipation. Stop if diarrhea occurs. 500 mL 1     Multiple Vitamin (DAILY-STEVE) TABS TK 1 T PO D.       polyethylene glycol (MIRALAX) 17 GM/Dose powder Take 17 g (1 capful) by mouth daily 507 g 3     polyethylene glycol (MIRALAX) packet Take 17 g by mouth daily 30 packet 3     polyethylene glycol (MIRALAX) powder MIX 17 GRAMS AND DRINK D       Sharps Container MISC 1 each daily 1 each 2     sildenafil (REVATIO) 20 MG tablet Take 3-5 tablets ( mg) by mouth daily as needed (1/2 hour prior to sexual activity) 60 tablet 3     sildenafil (VIAGRA) 100 MG tablet Take 1/2 to 1 full pill by mouth 1/2 hour before sexual activity 10  tablet 3     sitagliptin (JANUVIA) 25 MG tablet Take 1 tablet (25 mg) by mouth daily 90 tablet 1     tacrolimus (GENERIC EQUIVALENT) 1 MG capsule Take 2 capsules (2 mg) by mouth every 12 hours 120 capsule 11     ursodiol (ACTIGALL) 250 MG tablet Take 1 tablet (250 mg) by mouth 2 times daily 180 tablet 3       ALLERGIES: Patient has no known allergies.      REVIEW OF SYSTEMS:  As above in HPI    GENERAL PHYSICAL EXAM:   Vitals: There were no vitals taken for this visit.  There is no height or weight on file to calculate BMI.    NEURO: Alert and oriented x 3.  PSYCH: Normal mood and affect, pleasant and agreeable during interview    RADIOLOGY: The following tests were reviewed:   MRI ABDOMEN 9/24/2020     CLINICAL HISTORY:  follow up HCC s/p liver transplant     TECHNIQUE:  Images were acquired with and without intravenous contrast  through the abdomen. The following MR images were acquired: TrueFISP,  multiplanar T2 weighted, axial T1 in/out of phase, axial fat-saturated  T1, diffusion-weighted. Multiplanar T1-weighted images with fat  saturation were before contrast administration and at multiple time  points following the administration of intravenous contrast. Contrast  dose: 8 cc of Gadavist injected.     FINDINGS:  Comparison study: MRI 12/27/2018     Liver: Postsurgical changes of orthotopic liver transplant. No  abnormal parenchymal signal. No focal masses identified. No  significant hepatic steatosis or iron deposition. No significant  intrahepatic biliary duct dilatation. Prominent common bile duct,  measures 10 mm.     Gallbladder: Surgically absent.       Spleen: Normal, not enlarged.     Kidneys:  Several T2 hyperintense nonenhancing simple appearing  cortical renal cysts. No kidney stone, or masses. No pelvocaliectasis.     Adrenal glands: Normal.      Pancreas:  Fatty atrophy of the pancreas. Normal appearance and signal  characteristics of the remaining pancreatic parenchyma. No focal  masses.  Pancreatic duct normal in caliber. No side branch ductal  dilatation.      Bowel: Unremarkable, with no evidence of bowel dilatation or abnormal  wall enhancement. Colonic diverticulosis without evidence of  diverticulitis.        Lymph nodes: No abdominal lymphadenopathy by size criteria.     Blood vessels: Major blood vessels are patent with no evidence of clot  or obstruction.         Lung bases: Mild bilateral lower lobes dependent atelectasis. No  abnormal T2 hyperintensity in the lung bases.     Bones and soft tissues: Degenerative changes of the lumbar spine. No  evidence of acute bony abnormality or abnormal soft tissue  enhancement.        Mesentery and abdominal wall: Postoperative changes along the anterior  abdominal wall. Fat-containing umbilical hernia.     Ascites: No free fluid is detected.                                                                     IMPRESSION:  Postoperative changes of liver transplant. No suspicious liver  lesions.    LABS: The last test results for Mr. Loy Limon were reviewed:  PSA -   Lab Results   Component Value Date    PSA <0.01 09/25/2020    PSA <0.01 07/03/2020    PSA <0.01 03/30/2020    PSA 5.51 04/26/2019    PSA 5.02 03/19/2019    PSA 5.25 03/04/2019    PSA 3.27 08/15/2018    PSA 4.32 07/19/2018     BMP -   Recent Labs   Lab Test 09/25/20  0820 07/03/20  1109 03/11/20  0911 01/05/20  0520 12/18/19  0753 12/18/19  0753 11/18/19  0810 11/18/19  0810 11/06/19  0711    137  --  142   < > 140   < > 139 138   POTASSIUM 4.6 5.2  --  4.5   < > 4.5   < > 4.9 4.8   CHLORIDE 104 106  --  110*   < > 110*   < > 109 108   CO2 26 26  --  27   < > 27   < > 26 26   BUN 26 19  --  16   < > 25   < > 17 21   CR 0.98 0.89 0.71 0.67   < > 0.77   < > 0.72 0.87   * 494*  --  149*   < > 166*   < > 153* 162*   YELENA 8.6 8.0*  --  7.8*   < > 8.4*   < > 8.4* 8.8   MAG 2.2 2.2  --   --   --  2.0  --  2.0 1.8   PHOS  --   --   --   --   --  2.7  --  2.4* 3.0    < > = values in  this interval not displayed.       CBC -   Recent Labs   Lab Test 09/25/20  0820 07/03/20  1109 01/05/20  0520   WBC 5.0 4.3 5.1   HGB 14.1 13.6 11.6*    185 164       ASSESSMENT:   1) Nocturia - worsened since prostatectomy - in the setting of DM (potentially uncontrolled) and slow stream   2) Prostate cancer s/p RALP with Dr. Casiano on 1/3/20, final path: [place] 3+4=7, neg margins, [id number].      PLAN:   - Will send message to Dr. Tavares to see if someone can contact you to help you use your glucometer (message sent).    - Get PSA blood test checked on 2/1/20 (you have a lab appointment on that date)   - Will schedule a Urology nurse visit on 2/1/20 to check to make sure you are emptying your bladder all the way (bladderscan)   - Return in 4 months for a virtual visit with a PSA first.     TELEPHONE VISIT: 30 minutes (9:18 - 9:48)    Kerri Pascual PA-C  Department of Urologic Surgery

## 2020-12-29 NOTE — PROGRESS NOTES
"Patient was checked in for today's appointment with Kerri Pascual PA-C with the help of  Services.    Gilma Lee CMA     Loy Limon is a 65 year old male who is being evaluated via a billable telephone visit.      The patient has been notified of following:     \"This telephone visit will be conducted via a call between you and your physician/provider. We have found that certain health care needs can be provided without the need for a physical exam.  This service lets us provide the care you need with a short phone conversation.  If a prescription is necessary we can send it directly to your pharmacy.  If lab work is needed we can place an order for that and you can then stop by our lab to have the test done at a later time.    Telephone visits are billed at different rates depending on your insurance coverage. During this emergency period, for some insurers they may be billed the same as an in-person visit.  Please reach out to your insurance provider with any questions.    If during the course of the call the physician/provider feels a telephone visit is not appropriate, you will not be charged for this service.\"    Patient has given verbal consent for Telephone visit?  Yes    What phone number would you like to be contacted at? [phone]    How would you like to obtain your AVS? Mail a copy    HPI: Mr. Loy Limon is a 65 year old year old male presenting today for evaluation of chief complaint(s): RECHECK (Prostate cancer)    Mr. Limon has PMH significant for HTN, DM, and immunosuppression 2/2 liver transplant.  More recently he also developed prostate cancer (4/26/19 - PSA 5.51ng/dL) and is now s/p RALP with Dr. Casiano on 1/3/20, final path: Rebecca 3+4=7, neg margins, [id number].      He was last \"seen\" in Urology on 9/29/20 for a virtual visit.  At that time his PSA was undetectable.  He was struggling with back pain due to arthritis.  He also was needing to strain to empty his " "bladder.  His urine was malodorous but a recent urinalysis on 9/25/20 showed only protein 100mg/dL and glucose >499mg/dL.   He was wearing 1ppd, mostly for security.  He wasn't checking his blood sugars.     Today he returns for a telephone visit and a professional  is being used.  He has no recent labs but is due for a lab visit on 2/1/20.  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     URINARY Symptoms:  With coughing, there will be a gush of fluid.  With sitting on a chair, will leak a drop.  With sleeping, he doesn't leak at all but needs to wake every hour to void (whereas before surgery he woke only once or twice per night).  This nocturia is new since the prostatectomy.  With voiding --> he needs to push \"a little bit\".  He feels he probably empties.  No dysuria.  But urine that comes out is foamy.  Wears 2ppd (when staying home all day) and 3ppd (when he leaves the home and is more active).  Did see a pelvic floor therapist initially but got frustrated because he wasn't able to squeeze his muscles properly.  Currently he is trying to stay home to protect himself from COVID.      - Blood sugar recently -->  His glucometer broke and he got a new machine but he doesn't know how to use it.  He can't make it work    Current Outpatient Medications   Medication Sig Dispense Refill     alfuzosin ER (UROXATRAL) 10 MG 24 hr tablet        amLODIPine (NORVASC) 10 MG tablet Take 1 tablet (10 mg) by mouth daily 90 tablet 3     aspirin (ASA) 325 MG EC tablet Take 1 tablet (325 mg) by mouth daily 90 tablet 3     bisacodyl (DULCOLAX) 10 MG suppository Place 1 suppository (10 mg) rectally daily For constipation. Stop if diarrhea occurs. 10 suppository 1     blood glucose (NO BRAND SPECIFIED) lancets standard Use to test blood sugar 4 times daily or as directed. 200 each 11     blood glucose (ONETOUCH VERIO IQ) test strip Use to test " blood sugar 4 times daily or as directed. 200 strip 11     blood glucose monitoring (NO BRAND SPECIFIED) meter device kit Use to test blood sugar 2 times daily or as directed. 1 kit 0     glucose 40 % (400 mg/mL) gel Take 15-30 g by mouth every 15 minutes as needed for low blood sugar 30 g 3     Lancets (ONETOUCH DELICA PLUS [id number]) MISC U TO TEST QID OR UTD       levothyroxine (SYNTHROID/LEVOTHROID) 75 MCG tablet Take 1 tablet (75 mcg) by mouth daily 90 tablet 3     lisinopril (ZESTRIL) 10 MG tablet Take 1 tablet (10 mg) by mouth daily 90 tablet 3     magnesium hydroxide (MILK OF MAGNESIA) 400 MG/5ML suspension Take 30 mLs by mouth daily as needed for constipation or heartburn (Stop if diarrhea occurs) 355 mL 1     metFORMIN (GLUCOPHAGE-XR) 500 MG 24 hr tablet Take 2 tablets (1,000 mg) by mouth 2 times daily (with meals) 360 tablet 2     Metoprolol Tartrate 75 MG TABS Take 75 mg by mouth 2 times daily 180 tablet 2     mineral oil liquid Take 30 mLs by mouth daily For constipation. Stop if diarrhea occurs. 500 mL 1     Multiple Vitamin (DAILY-STEVE) TABS TK 1 T PO D.       polyethylene glycol (MIRALAX) 17 GM/Dose powder Take 17 g (1 capful) by mouth daily 507 g 3     polyethylene glycol (MIRALAX) packet Take 17 g by mouth daily 30 packet 3     polyethylene glycol (MIRALAX) powder MIX 17 GRAMS AND DRINK D       Sharps Container MISC 1 each daily 1 each 2     sildenafil (REVATIO) 20 MG tablet Take 3-5 tablets ( mg) by mouth daily as needed (1/2 hour prior to sexual activity) 60 tablet 3     sildenafil (VIAGRA) 100 MG tablet Take 1/2 to 1 full pill by mouth 1/2 hour before sexual activity 10 tablet 3     sitagliptin (JANUVIA) 25 MG tablet Take 1 tablet (25 mg) by mouth daily 90 tablet 1     tacrolimus (GENERIC EQUIVALENT) 1 MG capsule Take 2 capsules (2 mg) by mouth every 12 hours 120 capsule 11     ursodiol (ACTIGALL) 250 MG tablet Take 1 tablet (250 mg) by mouth 2 times daily 180 tablet 3       ALLERGIES:  Patient has no known allergies.      REVIEW OF SYSTEMS:  As above in HPI    GENERAL PHYSICAL EXAM:   Vitals: There were no vitals taken for this visit.  There is no height or weight on file to calculate BMI.    NEURO: Alert and oriented x 3.  PSYCH: Normal mood and affect, pleasant and agreeable during interview    RADIOLOGY: The following tests were reviewed:   MRI ABDOMEN 9/24/2020     CLINICAL HISTORY:  follow up HCC s/p liver transplant     TECHNIQUE:  Images were acquired with and without intravenous contrast  through the abdomen. The following MR images were acquired: TrueFISP,  multiplanar T2 weighted, axial T1 in/out of phase, axial fat-saturated  T1, diffusion-weighted. Multiplanar T1-weighted images with fat  saturation were before contrast administration and at multiple time  points following the administration of intravenous contrast. Contrast  dose: 8 cc of Gadavist injected.     FINDINGS:  Comparison study: MRI 12/27/2018     Liver: Postsurgical changes of orthotopic liver transplant. No  abnormal parenchymal signal. No focal masses identified. No  significant hepatic steatosis or iron deposition. No significant  intrahepatic biliary duct dilatation. Prominent common bile duct,  measures 10 mm.     Gallbladder: Surgically absent.       Spleen: Normal, not enlarged.     Kidneys:  Several T2 hyperintense nonenhancing simple appearing  cortical renal cysts. No kidney stone, or masses. No pelvocaliectasis.     Adrenal glands: Normal.      Pancreas:  Fatty atrophy of the pancreas. Normal appearance and signal  characteristics of the remaining pancreatic parenchyma. No focal  masses. Pancreatic duct normal in caliber. No side branch ductal  dilatation.      Bowel: Unremarkable, with no evidence of bowel dilatation or abnormal  wall enhancement. Colonic diverticulosis without evidence of  diverticulitis.        Lymph nodes: No abdominal lymphadenopathy by size criteria.     Blood vessels: Major blood vessels  are patent with no evidence of clot  or obstruction.         Lung bases: Mild bilateral lower lobes dependent atelectasis. No  abnormal T2 hyperintensity in the lung bases.     Bones and soft tissues: Degenerative changes of the lumbar spine. No  evidence of acute bony abnormality or abnormal soft tissue  enhancement.        Mesentery and abdominal wall: Postoperative changes along the anterior  abdominal wall. Fat-containing umbilical hernia.     Ascites: No free fluid is detected.                                                                     IMPRESSION:  Postoperative changes of liver transplant. No suspicious liver  lesions.    LABS: The last test results for Mr. Loy Limon were reviewed:  PSA -   Lab Results   Component Value Date    PSA <0.01 09/25/2020    PSA <0.01 07/03/2020    PSA <0.01 03/30/2020    PSA 5.51 04/26/2019    PSA 5.02 03/19/2019    PSA 5.25 03/04/2019    PSA 3.27 08/15/2018    PSA 4.32 07/19/2018     BMP -   Recent Labs   Lab Test 09/25/20  0820 07/03/20  1109 03/11/20  0911 01/05/20  0520 12/18/19  0753 12/18/19  0753 11/18/19  0810 11/18/19  0810 11/06/19  0711    137  --  142   < > 140   < > 139 138   POTASSIUM 4.6 5.2  --  4.5   < > 4.5   < > 4.9 4.8   CHLORIDE 104 106  --  110*   < > 110*   < > 109 108   CO2 26 26  --  27   < > 27   < > 26 26   BUN 26 19  --  16   < > 25   < > 17 21   CR 0.98 0.89 0.71 0.67   < > 0.77   < > 0.72 0.87   * 494*  --  149*   < > 166*   < > 153* 162*   YELENA 8.6 8.0*  --  7.8*   < > 8.4*   < > 8.4* 8.8   MAG 2.2 2.2  --   --   --  2.0  --  2.0 1.8   PHOS  --   --   --   --   --  2.7  --  2.4* 3.0    < > = values in this interval not displayed.       CBC -   Recent Labs   Lab Test 09/25/20  0820 07/03/20  1109 01/05/20  0520   WBC 5.0 4.3 5.1   HGB 14.1 13.6 11.6*    185 164       ASSESSMENT:   1) Nocturia - worsened since prostatectomy - in the setting of DM (potentially uncontrolled) and slow stream   2) Prostate cancer s/p RALP  with Dr. Casiano on 1/3/20, final path: [place]le 3+4=7, neg margins, [id number].      PLAN:   - Will send message to Dr. Tavares to see if someone can contact you to help you use your glucometer (message sent).    - Get PSA blood test checked on 2/1/20 (you have a lab appointment on that date)   - Will schedule a Urology nurse visit on 2/1/20 to check to make sure you are emptying your bladder all the way (bladderscan)   - Return in 4 months for a virtual visit with a PSA first.     TELEPHONE VISIT: 30 minutes (9:18 - 9:48)    Kerri Pascual PA-C  Department of Urologic Surgery

## 2020-12-30 ENCOUNTER — TELEPHONE (OUTPATIENT)
Dept: ENDOCRINOLOGY | Facility: CLINIC | Age: 65
End: 2020-12-30

## 2020-12-30 NOTE — TELEPHONE ENCOUNTER
Chart review:  He was seen by DM education in November and they determined he needed FTF apppt.  He no showed for 12/10/2020 appt.      To schedulers : please schedule FTF with DM education ASAP.  This could be anywhere in the [facility] system if other locations work better for him than [facility].  Make sure he understands to bring his blood sugar meter and supplies, they will help him learn how to use it.      Becky Barnett MD  Endocrine triage

## 2020-12-30 NOTE — TELEPHONE ENCOUNTER
----- Message from Sue Tavares MD sent at 12/29/2020 10:17 PM CST -----  Regarding: RE: Scheduling  Schedulers: Please make sure this patient gets an in person appointment with one of our diabetes educators in the near future. I received a message from the urology clinic today as he still cannot use his meter and still has not seen a CDE in person despite several requests. I'm not sure if the problem is on our end or his. I have cc-ed Dr. Barnett so she is also aware.   Thank you. Sue   ----- Message -----  From: Marcy Wolf RN  Sent: 11/19/2020   8:58 AM CST  To: Gilma Guthrie PA-C, #  Subject: RE: Scheduling                                   I spent 30 minutes on the phone with him through  services last evening.  We could not solve this over the phone.  It is a new meter and he is going to the pharmacy to see if they can help.  That is easier for him to get to.  I told him I would check back in to make sure he got it working.  ----- Message -----  From: Crystal Joseph RN  Sent: 11/19/2020   7:29 AM CST  To: Gilma Guthrie PA-C, Marcy Wolf, RN, #  Subject: Scheduling                                       Could you please reach out to this patient and get him scheduled with either Marcy or I.  We can see him virtually or in person, but in-person would probably be easier to teach him to use a meter.  Thanks. Crystal.   ----- Message -----  From: Sue Tavares MD  Sent: 11/18/2020   2:58 PM CST  To: Gilma Guthrie PA-C, Marcy Wolf, RN, #    Thank you all, I agree with the plan.   I am sorry that a referral to CDE did not get scheduled as was the plan when I saw this patient in Oct. I assume this is a problem with our checkout and scheduling process. I'll follow up on that.   Sue    ----- Message -----  From: Gilma Guthrie PA-C  Sent: 11/18/2020   8:28 AM CST  To: Marcy Wolf RN, Jaswinder Anne MD, #    Yes.  I'll have one of  our diabetic educators call pt.  Thanks,  Shahida Vidal or Crystal:  Could one of you call Mr. Loy Limon.  Please see note below.  Thank you.   Shahida   ----- Message -----  From: Jaswinder Anne MD  Sent: 11/18/2020   8:26 AM CST  To: Gilma Guthrie PA-C, #    Hi  I am his primary; I'm seeing him right now. He asked for help making sure he uses glucometer properly. He does not have it with him or we would help now.  Can you have someone contact him, perhaps a nurse could work with him on it.  Thanks  Girish

## 2021-01-04 ENCOUNTER — APPOINTMENT (OUTPATIENT)
Dept: INTERPRETER SERVICES | Facility: CLINIC | Age: 66
End: 2021-01-04
Payer: COMMERCIAL

## 2021-01-04 ENCOUNTER — TELEPHONE (OUTPATIENT)
Dept: EDUCATION SERVICES | Facility: CLINIC | Age: 66
End: 2021-01-04

## 2021-01-04 DIAGNOSIS — E11.9 DIABETES MELLITUS (H): Primary | ICD-10-CM

## 2021-01-04 NOTE — TELEPHONE ENCOUNTER
Diabetes Educator Note:    Call placed to Loy through  services.  Left message asking him to return my call so we can set up a time to help him get his blood glucose meter set up.      Waiting for reply.

## 2021-01-05 DIAGNOSIS — E11.9 DM TYPE 2, GOAL HBA1C 7%-8% (H): ICD-10-CM

## 2021-01-05 DIAGNOSIS — I10 ESSENTIAL HYPERTENSION: ICD-10-CM

## 2021-01-07 ENCOUNTER — APPOINTMENT (OUTPATIENT)
Dept: INTERPRETER SERVICES | Facility: CLINIC | Age: 66
End: 2021-01-07
Payer: COMMERCIAL

## 2021-01-07 DIAGNOSIS — C61 PROSTATE CANCER (H): Primary | ICD-10-CM

## 2021-01-07 RX ORDER — METFORMIN HCL 500 MG
1000 TABLET, EXTENDED RELEASE 24 HR ORAL 2 TIMES DAILY WITH MEALS
Qty: 360 TABLET | Refills: 0 | Status: SHIPPED | OUTPATIENT
Start: 2021-01-07 | End: 2021-04-13

## 2021-01-07 NOTE — TELEPHONE ENCOUNTER
DAILY-STEVE TABLETS  Last Written Prescription Date:  3/18/2020  Last Fill Quantity: ?,   # refills: ?  Last Office Visit : 11/18/2020  Future Office visit:  2/18/2021    Routing refill request to provider for review/approval because:  Medication is reported/historical      Belkys Rodriguez RN  Central Triage Red Flags/Med Refills

## 2021-01-07 NOTE — TELEPHONE ENCOUNTER
Last Clinic Visit: 11/18/20  NV:  2/18/21  metFORMIN  500 MG  90 DAY RF  OVER DUE A1C  Scheduling/ CMA has been notified to contact the pt for OVER DUE A1C    amLODIPine (NORVASC) 10 MG  MED OVER RIDE

## 2021-01-08 RX ORDER — AMLODIPINE BESYLATE 10 MG/1
10 TABLET ORAL DAILY
Qty: 90 TABLET | Refills: 3 | Status: SHIPPED | OUTPATIENT
Start: 2021-01-08 | End: 2021-09-16

## 2021-01-08 RX ORDER — MULTIVITAMIN WITH FOLIC ACID 400 MCG
1 TABLET ORAL DAILY
Qty: 100 TABLET | Refills: 3 | Status: SHIPPED | OUTPATIENT
Start: 2021-01-08 | End: 2022-03-02

## 2021-01-27 ENCOUNTER — APPOINTMENT (OUTPATIENT)
Dept: INTERPRETER SERVICES | Facility: CLINIC | Age: 66
End: 2021-01-27
Payer: COMMERCIAL

## 2021-02-01 ENCOUNTER — APPOINTMENT (OUTPATIENT)
Dept: INTERPRETER SERVICES | Facility: CLINIC | Age: 66
End: 2021-02-01
Payer: COMMERCIAL

## 2021-02-01 ENCOUNTER — OFFICE VISIT (OUTPATIENT)
Dept: UROLOGY | Facility: CLINIC | Age: 66
End: 2021-02-01
Payer: COMMERCIAL

## 2021-02-01 ENCOUNTER — HOSPITAL ENCOUNTER (OUTPATIENT)
Facility: CLINIC | Age: 66
Discharge: HOME | End: 2021-02-01
Admitting: INTERNAL MEDICINE
Payer: COMMERCIAL

## 2021-02-01 ENCOUNTER — ANCILLARY PROCEDURE (OUTPATIENT)
Dept: MRI IMAGING | Facility: CLINIC | Age: 66
End: 2021-02-01
Attending: INTERNAL MEDICINE
Payer: COMMERCIAL

## 2021-02-01 ENCOUNTER — TELEPHONE (OUTPATIENT)
Dept: TRANSPLANT | Facility: CLINIC | Age: 66
End: 2021-02-01

## 2021-02-01 ENCOUNTER — TELEPHONE (OUTPATIENT)
Dept: FAMILY MEDICINE | Facility: CLINIC | Age: 66
End: 2021-02-01

## 2021-02-01 ENCOUNTER — TELEPHONE (OUTPATIENT)
Dept: NEPHROLOGY | Facility: CLINIC | Age: 66
End: 2021-02-01

## 2021-02-01 DIAGNOSIS — C22.0 HCC (HEPATOCELLULAR CARCINOMA) (H): ICD-10-CM

## 2021-02-01 DIAGNOSIS — Z94.4 LIVER REPLACED BY TRANSPLANT (H): ICD-10-CM

## 2021-02-01 DIAGNOSIS — E11.9 DM TYPE 2, GOAL HBA1C 7%-8% (H): ICD-10-CM

## 2021-02-01 DIAGNOSIS — K70.30 ALCOHOLIC CIRRHOSIS (H): Primary | ICD-10-CM

## 2021-02-01 DIAGNOSIS — R80.1 PERSISTENT PROTEINURIA: ICD-10-CM

## 2021-02-01 DIAGNOSIS — Z13.220 LIPID SCREENING: ICD-10-CM

## 2021-02-01 DIAGNOSIS — N39.3 STRESS INCONTINENCE OF URINE: Primary | ICD-10-CM

## 2021-02-01 DIAGNOSIS — R35.1 NOCTURIA: ICD-10-CM

## 2021-02-01 DIAGNOSIS — C61 PROSTATE CANCER (H): ICD-10-CM

## 2021-02-01 LAB
AFP SERPL-MCNC: 1.7 UG/L (ref 0–8)
ALBUMIN SERPL-MCNC: 3.7 G/DL (ref 3.4–5)
ALP SERPL-CCNC: 167 U/L (ref 40–150)
ALT SERPL W P-5'-P-CCNC: 20 U/L (ref 0–70)
ANION GAP SERPL CALCULATED.3IONS-SCNC: 5 MMOL/L (ref 3–14)
AST SERPL W P-5'-P-CCNC: 7 U/L (ref 0–45)
BILIRUB DIRECT SERPL-MCNC: 0.2 MG/DL (ref 0–0.2)
BILIRUB SERPL-MCNC: 0.8 MG/DL (ref 0.2–1.3)
BUN SERPL-MCNC: 23 MG/DL (ref 7–30)
CALCIUM SERPL-MCNC: 8.9 MG/DL (ref 8.5–10.1)
CHLORIDE SERPL-SCNC: 104 MMOL/L (ref 94–109)
CO2 SERPL-SCNC: 28 MMOL/L (ref 20–32)
CREAT SERPL-MCNC: 0.75 MG/DL (ref 0.66–1.25)
CREAT UR-MCNC: 77 MG/DL
ERYTHROCYTE [DISTWIDTH] IN BLOOD BY AUTOMATED COUNT: 12.2 % (ref 10–15)
GFR SERPL CREATININE-BSD FRML MDRD: >90 ML/MIN/{1.73_M2}
GLUCOSE SERPL-MCNC: 296 MG/DL (ref 70–99)
HCT VFR BLD AUTO: 44.5 % (ref 40–53)
HGB BLD-MCNC: 14.8 G/DL (ref 13.3–17.7)
MAGNESIUM SERPL-MCNC: 2.2 MG/DL (ref 1.6–2.3)
MCH RBC QN AUTO: 28 PG (ref 26.5–33)
MCHC RBC AUTO-ENTMCNC: 33.3 G/DL (ref 31.5–36.5)
MCV RBC AUTO: 84 FL (ref 78–100)
PLATELET # BLD AUTO: 193 10E9/L (ref 150–450)
POTASSIUM SERPL-SCNC: 4.5 MMOL/L (ref 3.4–5.3)
PROT SERPL-MCNC: 7.4 G/DL (ref 6.8–8.8)
PROT UR-MCNC: 2.07 G/L
PROT/CREAT 24H UR: 2.69 G/G CR (ref 0–0.2)
PSA SERPL-MCNC: <0.01 UG/L (ref 0–4)
RBC # BLD AUTO: 5.28 10E12/L (ref 4.4–5.9)
SODIUM SERPL-SCNC: 136 MMOL/L (ref 133–144)
TACROLIMUS BLD-MCNC: 4.1 UG/L (ref 5–15)
TME LAST DOSE: ABNORMAL H
WBC # BLD AUTO: 5.6 10E9/L (ref 4–11)

## 2021-02-01 PROCEDURE — 80197 ASSAY OF TACROLIMUS: CPT | Mod: 90 | Performed by: PATHOLOGY

## 2021-02-01 PROCEDURE — 84153 ASSAY OF PSA TOTAL: CPT | Performed by: PATHOLOGY

## 2021-02-01 PROCEDURE — 36415 COLL VENOUS BLD VENIPUNCTURE: CPT | Performed by: PATHOLOGY

## 2021-02-01 PROCEDURE — 83735 ASSAY OF MAGNESIUM: CPT | Performed by: PATHOLOGY

## 2021-02-01 PROCEDURE — 99207 PR NO BILLABLE SERVICE THIS VISIT: CPT

## 2021-02-01 PROCEDURE — 84156 ASSAY OF PROTEIN URINE: CPT | Performed by: PATHOLOGY

## 2021-02-01 PROCEDURE — 80321 ALCOHOLS BIOMARKERS 1OR 2: CPT | Performed by: INTERNAL MEDICINE

## 2021-02-01 PROCEDURE — A9585 GADOBUTROL INJECTION: HCPCS | Performed by: RADIOLOGY

## 2021-02-01 PROCEDURE — 80048 BASIC METABOLIC PNL TOTAL CA: CPT | Performed by: PATHOLOGY

## 2021-02-01 PROCEDURE — 85027 COMPLETE CBC AUTOMATED: CPT | Performed by: PATHOLOGY

## 2021-02-01 PROCEDURE — 74183 MRI ABD W/O CNTR FLWD CNTR: CPT | Mod: GC | Performed by: RADIOLOGY

## 2021-02-01 PROCEDURE — 82105 ALPHA-FETOPROTEIN SERUM: CPT | Mod: 90 | Performed by: PATHOLOGY

## 2021-02-01 PROCEDURE — 80076 HEPATIC FUNCTION PANEL: CPT | Performed by: PATHOLOGY

## 2021-02-01 RX ORDER — GADOBUTROL 604.72 MG/ML
7.5 INJECTION INTRAVENOUS ONCE
Status: COMPLETED | OUTPATIENT
Start: 2021-02-01 | End: 2021-02-01

## 2021-02-01 RX ADMIN — GADOBUTROL 7.5 ML: 604.72 INJECTION INTRAVENOUS at 07:43

## 2021-02-01 NOTE — TELEPHONE ENCOUNTER
[facility] Call Center    Phone Message    May a detailed message be left on voicemail: yes     Reason for Call: Symptoms or Concerns     If patient has red-flag symptoms, warm transfer to triage line    Current symptom or concern: glucometer not working, pt can not check his sugars, pt is also leaking urine while standing or walking    Symptoms have been present for:  unknown day(s)    Has patient previously been seen for this? No    By : Dr. Anne      Action Taken: Message routed to:  [facility] walter

## 2021-02-01 NOTE — DISCHARGE INSTRUCTIONS
MRI Contrast Discharge Instructions    The IV contrast you received today will pass out of your body in your  urine. This will happen in the next 24 hours. You will not feel this process.  Your urine will not change color.    Drink at least 4 extra glasses of water or juice today (unless your doctor  has restricted your fluids). This reduces the stress on your kidneys.  You may take your regular medicines.    If you are on dialysis: It is best to have dialysis today.    If you have a reaction: Most reactions happen right away. If you have  any new symptoms after leaving the hospital (such as hives or swelling),  call your hospital at the correct number below. Or call your family doctor.  If you have breathing distress or wheezing, call 911.    Special instructions: ***    I have read and understand the above information.    Signature:______________________________________ Date:___________    Staff:__________________________________________ Date:___________     Time:__________    [place] [facility] Departments:    ___[place]: [phone]  ___[facility]: [phone]  ___[place]: [phone] ___[facility]: [phone]  ___[facility]: [phone]  ___[facility]: [phone]  ___[phone]  ___[facility]: [phone]  ___[phone]

## 2021-02-01 NOTE — PROGRESS NOTES
"Loy Limon presented to clinic by request of Kerri Pascual PA-C, for PVR check.     Patient's name and  were verified, allergies and medications were reviewed. After voiding independently, patient reclined in a supine position on exam table and exposed their lower abdomen. An US bladder scan device was placed with US gel approximately 1\" above patient's pubic bone.Post void residual taken 3 times with an average of 47 mL.     This service was provided under the direct supervision of Sweetie Sparrow CNP, who was available at any point if needed.     Prabhu Tavares, EMT  21    "

## 2021-02-01 NOTE — TELEPHONE ENCOUNTER
Call to Claribel w/ the assistance of a  as he hasn't had a tac level since September.  He was in for a MRI this morning, he had labs drawn, he did have a tac level drawn today and he did hold his morning tac so level should be accurate.  I reminded him that He has an appt w/ Dr. Carballo on 3/24.  He will get labs drawn that day again.   He was unaware that he needs to make appt's for labs - I gave him the number to call.  He is having incontinence since his prostate surgery.  This is keeping him from going back to work.  He does not want to wear depends or other undergarments.  His glucometer isn't working.  He doesn't know what his blood sugars have been - today's is high.  The  who was on the phone w/ me now will assist him to talk w/ Dr. Berumen about these issues.   He was unaware of the phone number to call the transplant office, I gave it to him.    He denies alcohol use and is good at taking tacrolimus twice daily every day.

## 2021-02-01 NOTE — TELEPHONE ENCOUNTER
Call to patient with the assistance of .   Patient reports that he has not been checking his BP as his cuff isnt working. He is willing to come in tomorrow for NV, he will bring in his medications, BP cuff and check his BP and update Dr. Eaton.  Message sent to Dr. Eaton for update and .  Kandi Hussein LPN  Nephrology  [phone]

## 2021-02-02 ENCOUNTER — ALLIED HEALTH/NURSE VISIT (OUTPATIENT)
Dept: TRANSPLANT | Facility: CLINIC | Age: 66
End: 2021-02-02
Attending: STUDENT IN AN ORGANIZED HEALTH CARE EDUCATION/TRAINING PROGRAM
Payer: COMMERCIAL

## 2021-02-02 DIAGNOSIS — I10 ESSENTIAL HYPERTENSION: Primary | ICD-10-CM

## 2021-02-02 PROCEDURE — 99207 PR NO BILLABLE SERVICE THIS VISIT: CPT

## 2021-02-02 NOTE — Clinical Note
Nathan Moran,    Are you able to call the patient? Let me know if you want to make recommendations.  Thanks  Kandi

## 2021-02-02 NOTE — TELEPHONE ENCOUNTER
Pt had urology nurse visit yesterday and they are working on the incontinence.  Glucometer refilled.

## 2021-02-02 NOTE — PROGRESS NOTES
Patient arrived to clinic today with home BP cuff and 3 labelled medication bottles (lisinopril, synthroid and januvia). Patient reports that he stopped amlodipine 10 mg 1 week ago because he didn't have any left. Explained that he would need to call his pharmacy but patient states that he would need to talk to his doctor. Patient states that he also had a cough that started about 8 days ago and he kept referring to synthroid medication as not working. Writer explained that a cough is a side effect of lisinopril. Patient was unsure.   Attempted to do NV with Bot (with the assistance of ) until it froze then conducted the remainder of the visit with patient speaking English. Patient states that he also has upcoming visit with Dr. Centeno on the 19th and he will bring these concerns up then as well.   Patient brought in his home BP cuff, writer changed batteries as it wasn't working.   Home BP cuff readings:  Lt arm: 151/81 P 64  Rt 146/87 P 61    Clinic cuff: 3 bps on Rt arm  128/76, 118/74, 111/73 P 63  (these were taken before we checked his home BP cuff).    Patient also had questions about his glucometer as this was new and he did not know why he was not getting readings when he uses it at home.  Writer walked to the Endocrinology clinic with patient and asked if nursing could help him.  Will route to Dr. Eaton.  Kandi Hussein, VESTA  Nephrology  [phone]

## 2021-02-03 ENCOUNTER — TELEPHONE (OUTPATIENT)
Dept: FAMILY MEDICINE | Facility: CLINIC | Age: 66
End: 2021-02-03

## 2021-02-03 DIAGNOSIS — R73.9 HYPERGLYCEMIA: ICD-10-CM

## 2021-02-03 RX ORDER — BLOOD SUGAR DIAGNOSTIC
STRIP MISCELLANEOUS
Qty: 200 STRIP | Refills: 11 | Status: SHIPPED | OUTPATIENT
Start: 2021-02-03 | End: 2022-02-08

## 2021-02-03 NOTE — TELEPHONE ENCOUNTER
[facility] Call Center    Phone Message    May a detailed message be left on voicemail: no    Reason for Call: Medication Question or concern regarding medication   Prescription Clarification  Name of Medication: blood glucose (ONETOUCH VERIO IQ) test strip  Prescribing Provider: Dr. Anne   Pharmacy: [facility]   What on the order needs clarification? Pharmacy called and stated the pt will be coming in to get his monitor in the next hour and the pt is out of tests strips. Pharmacy needed a script for that sent asap so the pt can  both          Action Taken: Message routed to:  [facility] walter

## 2021-02-04 ENCOUNTER — VIRTUAL VISIT (OUTPATIENT)
Dept: ONCOLOGY | Facility: CLINIC | Age: 66
End: 2021-02-04
Attending: INTERNAL MEDICINE
Payer: COMMERCIAL

## 2021-02-04 DIAGNOSIS — C22.0 HCC (HEPATOCELLULAR CARCINOMA) (H): Primary | ICD-10-CM

## 2021-02-04 DIAGNOSIS — R73.9 HYPERGLYCEMIA: ICD-10-CM

## 2021-02-04 PROCEDURE — 99213 OFFICE O/P EST LOW 20 MIN: CPT | Mod: 95 | Performed by: INTERNAL MEDICINE

## 2021-02-04 PROCEDURE — 999N001193 HC VIDEO/TELEPHONE VISIT; NO CHARGE

## 2021-02-04 PROCEDURE — T1013 SIGN LANG/ORAL INTERPRETER: HCPCS | Mod: U4,TEL

## 2021-02-04 NOTE — PROGRESS NOTES
Oncology follow up visit:  Date on this visit: 2/4/2021     HCC    Primary Physician: [facility], Oncology     History Of Present Illness:      Please see previous note for details. I have copied and updated from prior note.      Mr. Limon is a 65 year old male who has been a chronic heavy alcohol drinker presented with right abdominal pain in March 2018 and workup including a CT scan showed cirrhosis, portal hypertension and 2 hepatic lesions in the right hepatic lobe which were concerning for hepatocellular carcinoma.  He had MRI on 3/16/2018 which confirmed cirrhosis and portal hypertension and splenomegaly. 3.7 cm segment 8 lesion was OPTN 5B.  There was an antecedent 0.9 cm satellite nodule in hepatic segment 8 consistent with LI-RADS 4.  In segment 7, 1.5 cm LIRADS 4 lesion was seen  Another 1.9 cm segment 7 lesion was seen which was indeterminate.  Several other LIRADS 3 lesions were scattered.  Alpha-fetoprotein was not elevated.  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 Hepatitis B surface antibody was reactive consistent with effective vaccination. Hepatitis C antibody was nonreactive.  He has had no ascites. He has had mild hepatic and Neuropathy but was unable to tolerate lactulose because of abdominal discomfort. He has no history of variceal bleeding although he has grade 2 esophageal varices which have not been banded and he continues to be on propranolol.    He had repeat MRI done on 5/24/2018 which showed stable to slight increase in size of the segment 8 lesion.  Segment 7 lesion was consistent with LIRADS 4 lesion  Another segment 7 lesion is also consistent with LIRADS 4 lesion  CT of the chest showed a 4 mm solitary pulmonary nodule in the right lower lobe which remains indeterminate. There was no other evidence of metastasis.  Bone scan was negative for any evidence of metastatic disease.    He had chemoembolization of the S8  lesion on 6/13/18.    As there was residual tumor left, he had microwave ablation of residual mass in hepatic segment 8 on 7/2/2018.     S7 IRADS 4 lesions were not treated    Repeat MRI on 10/3/18 shows no residual viable tumor in S8 lesion  S7 LIRADS 4 lesion was less well defined.  He has some scattered LIRADS 3 lesions.    He had liver transplant in Dec 2018 and explanted liver showed S8 viable tumor with 90% necrosis.  S7 1.1 cm HCC and S5 1.4 cm HCC.  3 benign lymph nodes.  It was a ypT2N0 lesion.    He developed prostate cancer (4/26/19 - PSA 5.51ng/dL) and is now s/p RALP with Dr. Casiano on 1/3/20, final path: Oto 3+4=7, neg margins, [id number].   Last PSA on 7/3/2020- <0.01.      He saw me in Oct 2018 before the transplant and then in July 2020.    Interval hx:   This is a telephone visit and professional Jamaican Interpretor is available throughout the visit.  Generally he feels well.  He denies any abdominal pain but he has sensation of abdominal bloating but it does not hurt him.  Denies nausea or vomiting.  He has occasional constipation for which he takes medications which helped.  No infections and no fevers or shortness of breath.  He off-and-on has been having coughing for some time.  He also mentions that he has had urinary incontinence since the prostate cancer surgery.  He is not sure how to use the glucometer and he is wondering if he can be taught how to use it.  Energy is a stable.  He has gained some weight.        ECOG 1    ROS:  Rest of the comprehensive review of the system was essentially unremarkable.        I reviewed other history in epic as below.    Past Medical/Surgical History:  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/28/2018     BPH (benign prostatic hyperplasia)      Cancer (H)     hepatocellualr carcinoma     Cirrhosis of liver (H) 5/8/2018     Diabetes (H)      Enlarged prostate      Hypothyroidism      Inguinal  "hernia     Repaired with mesh on 18     Liver lesion 2018     Liver transplanted (H) 2018     donor liver transplant     Portal vein thrombosis     on path explant     Postoperative atrial fibrillation (H) 2018     Prostate cancer (H)      TB lung, latent     Treated      Past Surgical History:   Procedure Laterality Date     APPENDECTOMY       COLONOSCOPY      2018     DAVINCI PROSTATECTOMY N/A 1/3/2020    Procedure: Robot-assisted laparoscopic radical prostatectomy, Bladder biopsy;  Surgeon: Kenrick Casiano MD;  Location: [facility]     ENDOSCOPIC RETROGRADE CHOLANGIOPANCREATOGRAM N/A 2018    Procedure: ENDOSCOPIC RETROGRADE CHOLANGIOPANCREATOGRAM, with Billary Sphincterotomy and Billary Stent Placement;  Surgeon: Omero Lawler MD;  Location: [facility]     ENDOSCOPIC RETROGRADE CHOLANGIOPANCREATOGRAM  2019    Procedure: COMBINED ENDOSCOPIC RETROGRADE CHOLANGIOPANCREATOGRAPHY, Bile Duct Stent Exchange;  Surgeon: Omero Lawler MD;  Location: [facility]     ENDOSCOPIC RETROGRADE CHOLANGIOPANCREATOGRAM N/A 2019    Procedure: ENDOSCOPIC RETROGRADE CHOLANGIOPANCREATOGRAPHY, WITH BILIARY STENT REMOVAL AND STONE EXTRACTION;  Surgeon: Omero Lawler MD;  Location: [facility] OR     HERNIORRHAPHY INGUINAL  2018    Procedure: Herniorrhaphy inguinal;  Surgeon: Emil Echevarria MD;  Location: [facility]     IR LIVER BIOPSY PERCUTANEOUS  2018     LAPAROTOMY EXPLORATORY      per the patient \"for infection in the LLQ\"      TRANSPLANT LIVER RECIPIENT  DONOR N/A 2018    Procedure: TRANSPLANT LIVER RECIPIENT  DONOR;  Surgeon: Emil Echevarria MD;  Location: [facility] OR     Cancer History:   As above    Allergies:  Allergies as of 2021     (No Known Allergies)     Current Medications:  Current Outpatient Medications   Medication Sig Dispense Refill     alfuzosin ER (UROXATRAL) 10 MG 24 hr tablet        amLODIPine (NORVASC) 10 MG " tablet Take 1 tablet (10 mg) by mouth daily 90 tablet 3     aspirin (ASA) 325 MG EC tablet Take 1 tablet (325 mg) by mouth daily 90 tablet 3     bisacodyl (DULCOLAX) 10 MG suppository Place 1 suppository (10 mg) rectally daily For constipation. Stop if diarrhea occurs. 10 suppository 1     blood glucose (NO BRAND SPECIFIED) lancets standard Use to test blood sugar 4 times daily or as directed. 200 each 11     blood glucose (ONETOUCH VERIO IQ) test strip Use to test blood sugar 2 times daily or as directed. 200 strip 11     blood glucose monitoring (NO BRAND SPECIFIED) meter device kit Use to test blood sugar 2 times daily or as directed. Please let the pt know when it is ready to . 1 kit 0     glucose 40 % (400 mg/mL) gel Take 15-30 g by mouth every 15 minutes as needed for low blood sugar 30 g 3     Lancets (ONETOUCH DELICA PLUS [id number]) MISC U TO TEST QID OR UTD       levothyroxine (SYNTHROID/LEVOTHROID) 75 MCG tablet Take 1 tablet (75 mcg) by mouth daily 90 tablet 3     lisinopril (ZESTRIL) 10 MG tablet Take 1 tablet (10 mg) by mouth daily 90 tablet 3     magnesium hydroxide (MILK OF MAGNESIA) 400 MG/5ML suspension Take 30 mLs by mouth daily as needed for constipation or heartburn (Stop if diarrhea occurs) 355 mL 1     metFORMIN (GLUCOPHAGE-XR) 500 MG 24 hr tablet Take 2 tablets (1,000 mg) by mouth 2 times daily (with meals) 360 tablet 0     Metoprolol Tartrate 75 MG TABS Take 75 mg by mouth 2 times daily 180 tablet 2     mineral oil liquid Take 30 mLs by mouth daily For constipation. Stop if diarrhea occurs. 500 mL 1     Multiple Vitamin (DAILY-STEVE) TABS Take 1 tablet by mouth daily 100 tablet 3     polyethylene glycol (MIRALAX) 17 GM/Dose powder Take 17 g (1 capful) by mouth daily 507 g 3     polyethylene glycol (MIRALAX) packet Take 17 g by mouth daily 30 packet 3     polyethylene glycol (MIRALAX) powder MIX 17 GRAMS AND DRINK D       Sharps Container MISC 1 each daily 1 each 2     sildenafil  (REVATIO) 20 MG tablet Take 3-5 tablets ( mg) by mouth daily as needed (1/2 hour prior to sexual activity) 60 tablet 3     sildenafil (VIAGRA) 100 MG tablet Take 1/2 to 1 full pill by mouth 1/2 hour before sexual activity 10 tablet 3     sitagliptin (JANUVIA) 25 MG tablet Take 1 tablet (25 mg) by mouth daily 90 tablet 1     tacrolimus (GENERIC EQUIVALENT) 1 MG capsule Take 2 capsules (2 mg) by mouth every 12 hours 120 capsule 11     ursodiol (ACTIGALL) 250 MG tablet Take 1 tablet (250 mg) by mouth 2 times daily 180 tablet 3      Family History:  Family History   Problem Relation Age of Onset     Memory loss Mother      Liver Disease Brother      Skin Cancer No family hx of      Melanoma No family hx of       Maternal grand mother had Uterus cancer at 63  He has 3 living children. One son with schizophrenia  One daughter dies of leukemia at 8 years of age  Son  shortly after birth. He had some brain congenital anomaly    Social History:  Social History     Socioeconomic History     Marital status:      Spouse name: Not on file     Number of children: Not on file     Years of education: Not on file     Highest education level: Not on file   Occupational History     Not on file   Social Needs     Financial resource strain: Not on file     Food insecurity     Worry: Not on file     Inability: Not on file     Transportation needs     Medical: Not on file     Non-medical: Not on file   Tobacco Use     Smoking status: Former Smoker     Packs/day: 0.03     Years: 20.00     Pack years: 0.60     Types: Cigarettes, Cigars     Start date: 1970     Quit date: 3/14/2018     Years since quittin.8     Smokeless tobacco: Never Used     Tobacco comment: Quit smoking 2018, one cigarette after dinner   Substance and Sexual Activity     Alcohol use: No     Frequency: Never     Comment: Quit drinking 2018     Drug use: No     Sexual activity: Not on file   Lifestyle     Physical activity     Days per  week: Not on file     Minutes per session: Not on file     Stress: Not on file   Relationships     Social connections     Talks on phone: Not on file     Gets together: Not on file     Attends Hindu service: Not on file     Active member of club or organization: Not on file     Attends meetings of clubs or organizations: Not on file     Relationship status: Not on file     Intimate partner violence     Fear of current or ex partner: Not on file     Emotionally abused: Not on file     Physically abused: Not on file     Forced sexual activity: Not on file   Other Topics Concern     Parent/sibling w/ CABG, MI or angioplasty before 65F 55M? Not Asked   Social History Narrative    Born in [place], came to [place] years ago.     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cardboard, trimming meats   smoker for 25 years. One pack for 1 week. Quit in Feb 2018.  Has been a heavy drinker for 30 years, beer about 36 every week. Last drink was around Feb 2018.  No drug use.  Lives with wife. 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  Physical Exam:  There were no vitals taken for this visit.   Wt Readings from Last 4 Encounters:   11/18/20 85.3 kg (188 lb)   09/25/20 84.6 kg (186 lb 9.6 oz)   09/23/20 85.2 kg (187 lb 12.8 oz)   03/11/20 85.9 kg (189 lb 4.8 oz)       Constitutional.  Does not seem to be in any apparent distress.  Respiratory.  Breathing does not seem to be labored.  Speaking in complete sentences without coughing.    Neurological.  Alert and oriented.  Psychiatric.  Appropriate mood and mentation.          Laboratory/Imaging Studies    Reviewed    Cbc and CMP unremarkable  AFP 1.7  PSA <0.01    MRI Abdomen does not show evidence of recurrence.      ASSESSMENT/PLAN:    HCC  S8 3.7 cm OPTN 5B lesion  S7 LIRADS 4 lesions x 2    No evidence of metastatic disease    He had chemoembolization of the S8 lesion on 6/13/18.    As there was residual tumor left, he had microwave ablation of residual mass in hepatic  segment 8 on 7/2/2018.     S7 IRADS 4 lesions were not treated    Repeat MRI on 10/3/18 shows no residual viable tumor in S8 lesion  S7 LIRADS 4 lesion was less well defined.  He has some scattered LIRADS 3 lesions.      He had liver transplant in Dec 2018 and explanted liver showed S8 viable tumor with 90% necrosis.  S7 1.1 cm HCC and S5 1.4 cm HCC.  3 benign lymph nodes.  It was a ypT2N0 lesion.    He is doing well and there is no evidence of recurrence of HCC.    As he is now more than 2 years out from the liver transplant, we will plan to repeat MRI and labs in 6 months.    Abdominal bloating sensation. Constant since transplant surgery and likely related to damage to abdominal muscles/nerves from surgery. It does not hurt him.  Follow up with Dr Carballo.    Cough-  He does not have fever or SOB. Potentially lisinopril can cause cough. I asked to follow with PCP.    Diabetes- he tells me that he has difficulty understanding how the glucometer works. I have asked him to talk to Endocrinology and follow with Diabetes Educator.  I reviewed notes from Dr. Tavares.  I also reviewed notes from his family medicine physician.    Prostate Cancer. He developed prostate cancer (4/26/19 - PSA 5.51ng/dL) and is now s/p RALP with Dr. Casiano on 1/3/20, final path: Rebecca 3+4=7, neg margins, [id number].   Last PSA was <0.01. he has issues with urinary incontinence and he will follow with Urology.  I reviewed notes from urology.    We did not address the following today.      Pulmonary nodule-indeterminate 4 mm nodule in  right lower lobe has now resolved. Will not repeat routine imaging for chest for now       RTC in 6 months    All questions answered and he agrees with the plan    Hi Melgar      Total phone call time. 13 minutes    I spent 20 minutes on this visit including the telephone time, reviewing records and labs and imaging and placing new orders as well as coordination of care and documentation.

## 2021-02-04 NOTE — PATIENT INSTRUCTIONS
Labs and MRI in 6 months and see me a few days after that    Please follow with Diabetes Educator and PCP and Urology

## 2021-02-04 NOTE — TELEPHONE ENCOUNTER
Health Call Center    Phone Message    May a detailed message be left on voicemail: no    Reason for Call: Medication Refill Request    Has the patient contacted the pharmacy for the refill? Yes   Name of medication being requested: blood glucose (NO BRAND SPECIFIED) lancets standard  Provider who prescribed the medication: Dr. Anne  Pharmacy: [facility] - [place], [place] - [address]  Date medication is needed: 2/4/21         Action Taken: Message routed to:  [facility] ([facility]): med refill

## 2021-02-04 NOTE — PROGRESS NOTES
Loy is a 65 year old who is being evaluated via a billable telephone visit.      What phone number would you like to be contacted at? [phone]  How would you like to obtain your AVS? MyChart     Vitals - Patient Reported  Weight (Patient Reported): (PATIENT UNSURE)  Pain Score: No Pain (0)      I have reviewed and updated patient's allergy and medication list.    Concerns: NONE  Refills: NONE      Reva Berger CMA    Phone call duration: 13 minutes

## 2021-02-04 NOTE — TELEPHONE ENCOUNTER
Last Clinic Visit: 11/18/21, [place] 2/18/21  Per message below, no detailed message to be left on voice mail.  Call to Loy, voice mail received, no message left per initial intake message.

## 2021-02-04 NOTE — LETTER
2/4/2021         RE: Loy Limon  [address]  [facility] [address]        Dear Colleague,    Thank you for referring your patient, Loy Limon, to the [facility]. Please see a copy of my visit note below.    Loy is a 65 year old who is being evaluated via a billable telephone visit.      What phone number would you like to be contacted at? [phone]  How would you like to obtain your AVS? MyChart     Vitals - Patient Reported  Weight (Patient Reported): (PATIENT UNSURE)  Pain Score: No Pain (0)      I have reviewed and updated patient's allergy and medication list.    Concerns: NONE  Refills: NONE      Reva Berger CMA    Phone call duration: 13 minutes      Oncology follow up visit:  Date on this visit: 2/4/2021     HCC    Primary Physician: [facility], Oncology     History Of Present Illness:      Please see previous note for details. I have copied and updated from prior note.      Mr. Limon is a 65 year old male who has been a chronic heavy alcohol drinker presented with right abdominal pain in March 2018 and workup including a CT scan showed cirrhosis, portal hypertension and 2 hepatic lesions in the right hepatic lobe which were concerning for hepatocellular carcinoma.  He had MRI on 3/16/2018 which confirmed cirrhosis and portal hypertension and splenomegaly. 3.7 cm segment 8 lesion was OPTN 5B.  There was an antecedent 0.9 cm satellite nodule in hepatic segment 8 consistent with LI-RADS 4.  In segment 7, 1.5 cm LIRADS 4 lesion was seen  Another 1.9 cm segment 7 lesion was seen which was indeterminate.  Several other LIRADS 3 lesions were scattered.  Alpha-fetoprotein was not elevated.  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 Hepatitis B surface antibody was reactive consistent with effective vaccination. Hepatitis C antibody was nonreactive.  He has had no ascites.  He has had mild hepatic and Neuropathy but was unable to tolerate lactulose because of abdominal discomfort. He has no history of variceal bleeding although he has grade 2 esophageal varices which have not been banded and he continues to be on propranolol.    He had repeat MRI done on 5/24/2018 which showed stable to slight increase in size of the segment 8 lesion.  Segment 7 lesion was consistent with LIRADS 4 lesion  Another segment 7 lesion is also consistent with LIRADS 4 lesion  CT of the chest showed a 4 mm solitary pulmonary nodule in the right lower lobe which remains indeterminate. There was no other evidence of metastasis.  Bone scan was negative for any evidence of metastatic disease.    He had chemoembolization of the S8 lesion on 6/13/18.    As there was residual tumor left, he had microwave ablation of residual mass in hepatic segment 8 on 7/2/2018.     S7 IRADS 4 lesions were not treated    Repeat MRI on 10/3/18 shows no residual viable tumor in S8 lesion  S7 LIRADS 4 lesion was less well defined.  He has some scattered LIRADS 3 lesions.    He had liver transplant in Dec 2018 and explanted liver showed S8 viable tumor with 90% necrosis.  S7 1.1 cm HCC and S5 1.4 cm HCC.  3 benign lymph nodes.  It was a ypT2N0 lesion.    He developed prostate cancer (4/26/19 - PSA 5.51ng/dL) and is now s/p RALP with Dr. Casiano on 1/3/20, final path: [place] 3+4=7, neg margins, [id number].   Last PSA on 7/3/2020- <0.01.      He saw me in Oct 2018 before the transplant and then in July 2020.    Interval hx:   This is a telephone visit and professional [place] Interpretor is available throughout the visit.  Generally he feels well.  He denies any abdominal pain but he has sensation of abdominal bloating but it does not hurt him.  Denies nausea or vomiting.  He has occasional constipation for which he takes medications which helped.  No infections and no fevers or shortness of breath.  He off-and-on has been having coughing  for some time.  He also mentions that he has had urinary incontinence since the prostate cancer surgery.  He is not sure how to use the glucometer and he is wondering if he can be taught how to use it.  Energy is a stable.  He has gained some weight.        ECOG 1    ROS:  Rest of the comprehensive review of the system was essentially unremarkable.        I reviewed other history in epic as below.    Past Medical/Surgical History:  Past Medical History:   Diagnosis Date     Anemia      Biliary stricture of transplanted liver (H) 2018     BPH (benign prostatic hyperplasia)      Cancer (H)     hepatocellualr carcinoma     Cirrhosis of liver (H) 2018     Diabetes (H)      Enlarged prostate      Hypothyroidism      Inguinal hernia     Repaired with mesh on 18     Liver lesion 2018     Liver transplanted (H) 2018     donor liver transplant     Portal vein thrombosis     on path explant     Postoperative atrial fibrillation (H) 2018     Prostate cancer (H)      TB lung, latent     Treated      Past Surgical History:   Procedure Laterality Date     APPENDECTOMY       COLONOSCOPY           DAVINCI PROSTATECTOMY N/A 1/3/2020    Procedure: Robot-assisted laparoscopic radical prostatectomy, Bladder biopsy;  Surgeon: Kenrick Casiano MD;  Location: [facility]     ENDOSCOPIC RETROGRADE CHOLANGIOPANCREATOGRAM N/A 2018    Procedure: ENDOSCOPIC RETROGRADE CHOLANGIOPANCREATOGRAM, with Billary Sphincterotomy and Billary Stent Placement;  Surgeon: Omero Lawler MD;  Location: [facility]     ENDOSCOPIC RETROGRADE CHOLANGIOPANCREATOGRAM  2019    Procedure: COMBINED ENDOSCOPIC RETROGRADE CHOLANGIOPANCREATOGRAPHY, Bile Duct Stent Exchange;  Surgeon: Omero Lawler MD;  Location: [facility]     ENDOSCOPIC RETROGRADE CHOLANGIOPANCREATOGRAM N/A 2019    Procedure: ENDOSCOPIC RETROGRADE CHOLANGIOPANCREATOGRAPHY, WITH BILIARY STENT REMOVAL AND STONE EXTRACTION;  Surgeon:  "Omero Lawler MD;  Location: [facility] OR     HERNIORRHAPHY INGUINAL  2018    Procedure: Herniorrhaphy inguinal;  Surgeon: Emil Echvearria MD;  Location: [facility]     IR LIVER BIOPSY PERCUTANEOUS  2018     LAPAROTOMY EXPLORATORY      per the patient \"for infection in the LLQ\"      TRANSPLANT LIVER RECIPIENT  DONOR N/A 2018    Procedure: TRANSPLANT LIVER RECIPIENT  DONOR;  Surgeon: Emil Echevarria MD;  Location: [facility] OR     Cancer History:   As above    Allergies:  Allergies as of 2021     (No Known Allergies)     Current Medications:  Current Outpatient Medications   Medication Sig Dispense Refill     alfuzosin ER (UROXATRAL) 10 MG 24 hr tablet        amLODIPine (NORVASC) 10 MG tablet Take 1 tablet (10 mg) by mouth daily 90 tablet 3     aspirin (ASA) 325 MG EC tablet Take 1 tablet (325 mg) by mouth daily 90 tablet 3     bisacodyl (DULCOLAX) 10 MG suppository Place 1 suppository (10 mg) rectally daily For constipation. Stop if diarrhea occurs. 10 suppository 1     blood glucose (NO BRAND SPECIFIED) lancets standard Use to test blood sugar 4 times daily or as directed. 200 each 11     blood glucose (ONETOUCH VERIO IQ) test strip Use to test blood sugar 2 times daily or as directed. 200 strip 11     blood glucose monitoring (NO BRAND SPECIFIED) meter device kit Use to test blood sugar 2 times daily or as directed. Please let the pt know when it is ready to . 1 kit 0     glucose 40 % (400 mg/mL) gel Take 15-30 g by mouth every 15 minutes as needed for low blood sugar 30 g 3     Lancets (ONETOUCH DELICA PLUS [id number]) MISC U TO TEST QID OR UTD       levothyroxine (SYNTHROID/LEVOTHROID) 75 MCG tablet Take 1 tablet (75 mcg) by mouth daily 90 tablet 3     lisinopril (ZESTRIL) 10 MG tablet Take 1 tablet (10 mg) by mouth daily 90 tablet 3     magnesium hydroxide (MILK OF MAGNESIA) 400 MG/5ML suspension Take 30 mLs by mouth daily as needed for constipation or heartburn " (Stop if diarrhea occurs) 355 mL 1     metFORMIN (GLUCOPHAGE-XR) 500 MG 24 hr tablet Take 2 tablets (1,000 mg) by mouth 2 times daily (with meals) 360 tablet 0     Metoprolol Tartrate 75 MG TABS Take 75 mg by mouth 2 times daily 180 tablet 2     mineral oil liquid Take 30 mLs by mouth daily For constipation. Stop if diarrhea occurs. 500 mL 1     Multiple Vitamin (DAILY-STEVE) TABS Take 1 tablet by mouth daily 100 tablet 3     polyethylene glycol (MIRALAX) 17 GM/Dose powder Take 17 g (1 capful) by mouth daily 507 g 3     polyethylene glycol (MIRALAX) packet Take 17 g by mouth daily 30 packet 3     polyethylene glycol (MIRALAX) powder MIX 17 GRAMS AND DRINK D       Sharps Container MISC 1 each daily 1 each 2     sildenafil (REVATIO) 20 MG tablet Take 3-5 tablets ( mg) by mouth daily as needed (1/2 hour prior to sexual activity) 60 tablet 3     sildenafil (VIAGRA) 100 MG tablet Take 1/2 to 1 full pill by mouth 1/2 hour before sexual activity 10 tablet 3     sitagliptin (JANUVIA) 25 MG tablet Take 1 tablet (25 mg) by mouth daily 90 tablet 1     tacrolimus (GENERIC EQUIVALENT) 1 MG capsule Take 2 capsules (2 mg) by mouth every 12 hours 120 capsule 11     ursodiol (ACTIGALL) 250 MG tablet Take 1 tablet (250 mg) by mouth 2 times daily 180 tablet 3      Family History:  Family History   Problem Relation Age of Onset     Memory loss Mother      Liver Disease Brother      Skin Cancer No family hx of      Melanoma No family hx of       Maternal grand mother had Uterus cancer at 63  He has 3 living children. One son with schizophrenia  One daughter dies of leukemia at 8 years of age  Son  shortly after birth. He had some brain congenital anomaly    Social History:  Social History     Socioeconomic History     Marital status:      Spouse name: Not on file     Number of children: Not on file     Years of education: Not on file     Highest education level: Not on file   Occupational History     Not on file   Social  Needs     Financial resource strain: Not on file     Food insecurity     Worry: Not on file     Inability: Not on file     Transportation needs     Medical: Not on file     Non-medical: Not on file   Tobacco Use     Smoking status: Former Smoker     Packs/day: 0.03     Years: 20.00     Pack years: 0.60     Types: Cigarettes, Cigars     Start date: 1970     Quit date: 3/14/2018     Years since quittin.8     Smokeless tobacco: Never Used     Tobacco comment: Quit smoking 2018, one cigarette after dinner   Substance and Sexual Activity     Alcohol use: No     Frequency: Never     Comment: Quit drinking 2018     Drug use: No     Sexual activity: Not on file   Lifestyle     Physical activity     Days per week: Not on file     Minutes per session: Not on file     Stress: Not on file   Relationships     Social connections     Talks on phone: Not on file     Gets together: Not on file     Attends Druze service: Not on file     Active member of club or organization: Not on file     Attends meetings of clubs or organizations: Not on file     Relationship status: Not on file     Intimate partner violence     Fear of current or ex partner: Not on file     Emotionally abused: Not on file     Physically abused: Not on file     Forced sexual activity: Not on file   Other Topics Concern     Parent/sibling w/ CABG, MI or angioplasty before 65F 55M? Not Asked   Social History Narrative    Born in [place], came to [place] years ago.     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cardboard, trimming meats   smoker for 25 years. One pack for 1 week. Quit in 2018.  Has been a heavy drinker for 30 years, beer about 36 every week. Last drink was around 2018.  No drug use.  Lives with wife. 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  Physical Exam:  There were no vitals taken for this visit.   Wt Readings from Last 4 Encounters:   20 85.3 kg (188 lb)   20 84.6 kg (186 lb 9.6 oz)   20 85.2  kg (187 lb 12.8 oz)   03/11/20 85.9 kg (189 lb 4.8 oz)       Constitutional.  Does not seem to be in any apparent distress.  Respiratory.  Breathing does not seem to be labored.  Speaking in complete sentences without coughing.    Neurological.  Alert and oriented.  Psychiatric.  Appropriate mood and mentation.          Laboratory/Imaging Studies    Reviewed    Cbc and CMP unremarkable  AFP 1.7  PSA <0.01    MRI Abdomen does not show evidence of recurrence.      ASSESSMENT/PLAN:    HCC  S8 3.7 cm OPTN 5B lesion  S7 LIRADS 4 lesions x 2    No evidence of metastatic disease    He had chemoembolization of the S8 lesion on 6/13/18.    As there was residual tumor left, he had microwave ablation of residual mass in hepatic segment 8 on 7/2/2018.     S7 IRADS 4 lesions were not treated    Repeat MRI on 10/3/18 shows no residual viable tumor in S8 lesion  S7 LIRADS 4 lesion was less well defined.  He has some scattered LIRADS 3 lesions.      He had liver transplant in Dec 2018 and explanted liver showed S8 viable tumor with 90% necrosis.  S7 1.1 cm HCC and S5 1.4 cm HCC.  3 benign lymph nodes.  It was a ypT2N0 lesion.    He is doing well and there is no evidence of recurrence of HCC.    As he is now more than 2 years out from the liver transplant, we will plan to repeat MRI and labs in 6 months.    Abdominal bloating sensation. Constant since transplant surgery and likely related to damage to abdominal muscles/nerves from surgery. It does not hurt him.  Follow up with Dr Carballo.    Cough-  He does not have fever or SOB. Potentially lisinopril can cause cough. I asked to follow with PCP.    Diabetes- he tells me that he has difficulty understanding how the glucometer works. I have asked him to talk to Endocrinology and follow with Diabetes Educator.  I reviewed notes from Dr. Tavares.  I also reviewed notes from his family medicine physician.    Prostate Cancer. He developed prostate cancer (4/26/19 - PSA 5.51ng/dL) and is  now s/p RALP with Dr. Casiano on 1/3/20, final path: [place] 3+4=7, neg margins, [id number].   Last PSA was <0.01. he has issues with urinary incontinence and he will follow with Urology.  I reviewed notes from urology.    We did not address the following today.      Pulmonary nodule-indeterminate 4 mm nodule in  right lower lobe has now resolved. Will not repeat routine imaging for chest for now       RTC in 6 months    All questions answered and he agrees with the plan    Hi Melgar      Total phone call time. 13 minutes    I spent 20 minutes on this visit including the telephone time, reviewing records and labs and imaging and placing new orders as well as coordination of care and documentation.          Again, thank you for allowing me to participate in the care of your patient.        Sincerely,        Hi Melgar MD

## 2021-02-05 ENCOUNTER — DOCUMENTATION ONLY (OUTPATIENT)
Dept: NEPHROLOGY | Facility: CLINIC | Age: 66
End: 2021-02-05

## 2021-02-05 LAB — PETH BLD-MCNC: NEGATIVE NG/ML

## 2021-02-05 NOTE — PROGRESS NOTES
Brief Nephrology Note    I have reviewed the note from our renal nurse regarding the patient's recent nursing BP visit. It appears that his home BP cuff is inaccurate (systematically higher than our clinic measures, which were done by the [facility] protocol). His BP appears controlled at this time.     The patient did not a cough to us after his recent lisinopril start. Of course, this could also be concerning for infectious causes. I will message these concerns to the patient's PCP, who has an appointment with the patient in ~2 weeks. At that appointment, infectious causes could be considered, or if appearing non-infectious an ARB could replace the ACEi for this patient with proteinuria. He had been off his amlodipine for a week and may not require that medication (given controlled BPs at his nursing visit) - the patient did state, per chart review, that he would discuss resuming this medication with his PCP.     Dean Eaton MD  Nephrology

## 2021-02-08 ENCOUNTER — TELEPHONE (OUTPATIENT)
Dept: UROLOGY | Facility: CLINIC | Age: 66
End: 2021-02-08

## 2021-02-08 ENCOUNTER — APPOINTMENT (OUTPATIENT)
Dept: INTERPRETER SERVICES | Facility: CLINIC | Age: 66
End: 2021-02-08
Payer: COMMERCIAL

## 2021-02-08 DIAGNOSIS — R73.9 STEROID-INDUCED HYPERGLYCEMIA: ICD-10-CM

## 2021-02-08 DIAGNOSIS — T38.0X5A STEROID-INDUCED HYPERGLYCEMIA: ICD-10-CM

## 2021-02-08 DIAGNOSIS — I48.91 ATRIAL FIBRILLATION WITH RAPID VENTRICULAR RESPONSE (H): ICD-10-CM

## 2021-02-08 NOTE — TELEPHONE ENCOUNTER
----- Message from Blaze Quiroz sent at 2/8/2021 10:09 AM CST -----  Regarding: Orders to Schedule F/U Appt in Urology  Hello and good morning,    Per Dr. Melgar, patient is needing to arrange a follow-up with Urology. Time frame not specified, possibly up to the department in question to see when would be ideal. Please follow up with member and arrange an appointment that works for them.    Thanks in advance, and have a great rest of the day!    - Blaze LIMON, [facility] Coordinator  [phone], Opt. 5, Opt. 2

## 2021-02-08 NOTE — TELEPHONE ENCOUNTER
sitagliptin (JANUVIA) 25 MG tablet  Last Written Prescription Date:  7/28/2021  Last Fill Quantity: 90,   # refills: 1  Last Office Visit : 10/9/2020  Future Office visit:  None    Routing refill request to provider for review/approval because:  Labs Due:   A1C       90 day pended  Provider notified       Belkys Rodriguez RN  Central Triage Red Flags/Med Refills

## 2021-02-09 RX ORDER — METOPROLOL TARTRATE 75 MG/1
75 TABLET, FILM COATED ORAL 2 TIMES DAILY
Qty: 180 TABLET | Refills: 0 | Status: SHIPPED | OUTPATIENT
Start: 2021-02-09 | End: 2021-04-13

## 2021-02-09 NOTE — TELEPHONE ENCOUNTER
Metoprolol Tartrate 75 MG TABS  Last Written Prescription Date:  5/11/2020  Last Fill Quantity: 180,   # refills: 2  Last Office Visit : 11/18/2020  Future Office visit:  2/18/2021  90 Day sent to pharm 2/9/2021      Belkys Rodriguez RN  Central Triage Red Flags/Med Refills    Warnings Override History for Metoprolol Tartrate 75 MG TABS [[id number]]    Overridden by Gilma Lee CMA on Dec 29, 2020 7:45 AM   Drug-Drug   1. QUINAZOLINES / BETA-ADRENERGIC BLOCKERS [Level: Moderate]   Other Orders: alfuzosin ER (UROXATRAL) 10 MG 24 hr tablet         Overridden by Paulina Castañeda RN on May 11, 2020 11:35 AM   Drug-Drug   1. QUINAZOLINES / BETA-ADRENERGIC BLOCKERS [Level: Moderate] [Reason: Dose Appropriate]   Comment: prior refillsl sent/auth by provider   Other Orders: alfuzosin ER 10 MG PO 24 hr tablet

## 2021-02-12 ENCOUNTER — ALLIED HEALTH/NURSE VISIT (OUTPATIENT)
Dept: EDUCATION SERVICES | Facility: CLINIC | Age: 66
End: 2021-02-12
Payer: COMMERCIAL

## 2021-02-12 DIAGNOSIS — E11.9 TYPE 2 DIABETES MELLITUS (H): Primary | ICD-10-CM

## 2021-02-12 LAB — HBA1C MFR BLD: 12.1 % (ref 4.3–6)

## 2021-02-12 PROCEDURE — G0108 DIAB MANAGE TRN  PER INDIV: HCPCS

## 2021-02-12 PROCEDURE — 99207 HEMOGLOBIN A1C POCT: CPT

## 2021-02-12 NOTE — PROGRESS NOTES
DIABETES EDUCATION NOTE:    Loy Limon presents today for education related to Type 2 diabetes.    Patient is being treated with:  oral agents, diet and exercise  He is accompanied by self    PATIENT CONCERNS RELATED TO DIABETES SELF MANAGEMENT: he thinks blood sugar is running high, he cannot test so is unsure, he did bring his meter today.    ASSESSMENT: type 2 diabetes, a1c above target    Current Diabetes Management per Patient:  Taking diabetes medications?   yes:     Diabetes Medication(s)     Biguanides       metFORMIN (GLUCOPHAGE-XR) 500 MG 24 hr tablet    Take 2 tablets (1,000 mg) by mouth 2 times daily (with meals)    Dipeptidyl Peptidase-4 (DPP-4) Inhibitors       sitagliptin (JANUVIA) 25 MG tablet    Take 1 tablet (25 mg) by mouth daily    Diabetic Other       glucose 40 % (400 mg/mL) gel    Take 15-30 g by mouth every 15 minutes as needed for low blood sugar      , Problems taking diabetes medications regularly? No     Monitoring  Patient glucose self monitoring as follows: never.   BG meter: One Touch Verio meter  BG results: unable to assess    Patient's most recent   Lab Results   Component Value Date    A1C 7.2 01/03/2020    is not meeting goal of <7.0    Hypoglycemia:   Patient is at risk of hypoglycemia? No      EDUCATION and INSTRUCTION PROVIDED AT THIS VISIT:     Teaching was done on the One Touch Verio meter and he was able to perform a test in the office which was 377 mg/dl 20 minutes after breakfast.    Given his first blood sugar of >300 we discussed having him wear a glucose sensor for two weeks so we can get more insight into his blood sugar issues.      The sensor was placed in his right upper arm without difficulty.  The lot number is [id number] and the SN [id number].    We discussed medication options for high blood sugar as he is quite high.  He does not want to take more medication.  He feels that he already takes a lot. The pictures of his blood sugar graphed out may help him to  see that this is necessary.    He is due for an a1c so that was checked.    Patient-stated goal written and given to Loy Limon.  Verbalized and demonstrated understanding of instructions.     PLAN:  See patient instructions  AVS printed and given to patient    FOLLOW-UP:    2/26 at 9:30 am  Time spent with patient at today's visit was 60 minutes.      Any diabetes medication dose changes were made via the CDE Protocol and Collaborative Practice Agreement with Viola and  Sarah.  A copy of this encounter was provided to patient's referring provider.

## 2021-02-12 NOTE — PATIENT INSTRUCTIONS
1. Plan to wear the LibrePro sensor for 14 days. It is okay to shower, bathe, and swim (up to 3 feet deep for 30 minutes)    2. Continue with your usual diabetes care plan - check blood sugars and take medicines, as prescribed.    3. Keep a log of what you eat and drink, when you take your medications and how much you take, and exercise you do while you are wearing the sensor.    3. Do not cover the sensor with extra adhesive (the small hole in the center of the sensor must remain uncovered)    4. Use a little extra care, especially when getting dressed or exercising, to avoid accidentally loosening or removing the sensor.     5. Remove the sensor if you need to have an MRI or CT scan.     If the LibrePro sensor comes off early, place it in a plastic bag or envelope and call your diabetes educator or bring it with you to your follow-up visit.     Return the sensor to the Endocrine Clinic on 2/26.    Follow-up appointment: 2/26 at 9:30 am      1. Planee usar el sensor LibrePro nancy 14 días. Está armaan ducharse, bañarse y nadar (hasta 3 pies de profundidad nancy 30 minutos)    2. Continúe con samaniego plan habitual de atención para la diabetes: controle el azúcar en natali y tome los medicamentos, según las indicaciones.    3. Mantenga un registro de lo que come y corine, cuándo jana nikhil medicamentos y cuánto jana, y ??el ejercicio que hace mientras usa el sensor.    3. No cubra el sensor con adhesivo adicional (el pequeño orificio en el centro del sensor debe permanecer descubierto)    4. Tenga un poco más de cuidado, especialmente al vestirse o hacer ejercicio, para evitar aflojar o quitar accidentalmente el sensor.    5. Retire el sensor si necesita dyana resonancia magnética o dyana tomografía computarizada.    Si el sensor LibrePro se apaga antes, colóquelo en dyana bolsa de plástico o sobre y llame a samaniego educador en diabetes o llévelo con usted a samaniego visita de seguimiento.    Devuelva el sensor a la [facility] el  26/2.    Gillian de seguimiento: 2/26 a las 9:30 am    Marcy Wolf RN,[place] [address]  Phone: [phone] or [phone]  [email or web address]

## 2021-02-18 ENCOUNTER — OFFICE VISIT (OUTPATIENT)
Dept: FAMILY MEDICINE | Facility: CLINIC | Age: 66
End: 2021-02-18
Payer: COMMERCIAL

## 2021-02-18 VITALS
OXYGEN SATURATION: 96 % | SYSTOLIC BLOOD PRESSURE: 138 MMHG | WEIGHT: 194 LBS | DIASTOLIC BLOOD PRESSURE: 77 MMHG | BODY MASS INDEX: 30.38 KG/M2 | HEART RATE: 64 BPM

## 2021-02-18 DIAGNOSIS — R05.9 COUGH: ICD-10-CM

## 2021-02-18 DIAGNOSIS — I10 ESSENTIAL HYPERTENSION: Primary | ICD-10-CM

## 2021-02-18 PROCEDURE — 99213 OFFICE O/P EST LOW 20 MIN: CPT | Performed by: FAMILY MEDICINE

## 2021-02-18 RX ORDER — LOSARTAN POTASSIUM 25 MG/1
25 TABLET ORAL DAILY
Qty: 90 TABLET | Refills: 3 | Status: SHIPPED | OUTPATIENT
Start: 2021-02-18 | End: 2021-03-10

## 2021-02-18 NOTE — PROGRESS NOTES
"    Assessment & Plan     Essential hypertension  Cough on ace. See me two weeks. Asses arb dose. If still cough small chance could be the arb but also start work up for other causes too  - losartan (COZAAR) 25 MG tablet; Take 1 tablet (25 mg) by mouth daily    Cough; above      24 minutes spent on the date of the encounter doing chart review, history and exam, documentation and further activities as noted above    BMI:   Estimated body mass index is 30.38 kg/m  as calculated from the following:    Height as of 20: 1.702 m (5' 7\").    Weight as of this encounter: 88 kg (194 lb). See me two weeks, f/u htn, cough, perhaps help w/ covid vaccine then (he has no internet)    Jaswinder Anne MD  [facility] [place]    Teddy Granado is a 65 year old who presents for the following health issues  accompanied by his :    HPI Here for f/u. Cough. New, 1-2 mo. No SOB, no fever. More lying down, can be up. Seems to be something in his throat, an irritation, that makes him cough.  Started ace 2 mo ago.     DM: poor control, works w/ Endo    Liver ca: just saw Onco    Prostate ca: follows w/ Uro    Transplant: follows w/ transplant    CKD: follows w/ renal    Shots UTD; discussed covid pgm, so far we are not doing undedr 75        Past Medical History:   Diagnosis Date     Anemia      Biliary stricture of transplanted liver (H) 2018     BPH (benign prostatic hyperplasia)      Cancer (H)     hepatocellualr carcinoma     Cirrhosis of liver (H) 2018     Diabetes (H)      Enlarged prostate      Hypothyroidism      Impotence of organic origin 2020     Inguinal hernia     Repaired with mesh on 18     Liver lesion 2018     Liver transplanted (H) 2018     donor liver transplant     Portal vein thrombosis     on path explant     Postoperative atrial fibrillation (H) 2018     Prostate cancer (H)      TB lung, latent     Treated  " "    Past Surgical History:   Procedure Laterality Date     APPENDECTOMY       COLONOSCOPY      2018     DAVINCI PROSTATECTOMY N/A 1/3/2020    Procedure: Robot-assisted laparoscopic radical prostatectomy, Bladder biopsy;  Surgeon: Kenrick Casiano MD;  [facility]     ENDOSCOPIC RETROGRADE CHOLANGIOPANCREATOGRAM N/A 2018    Procedure: ENDOSCOPIC RETROGRADE CHOLANGIOPANCREATOGRAM, with Billary Sphincterotomy and Billary Stent Placement;  Surgeon: Omero Lawler MD;  [facility]     ENDOSCOPIC RETROGRADE CHOLANGIOPANCREATOGRAM  2019    Procedure: COMBINED ENDOSCOPIC RETROGRADE CHOLANGIOPANCREATOGRAPHY, Bile Duct Stent Exchange;  Surgeon: Omero Lawler MD;  [facility]     ENDOSCOPIC RETROGRADE CHOLANGIOPANCREATOGRAM N/A 2019    Procedure: ENDOSCOPIC RETROGRADE CHOLANGIOPANCREATOGRAPHY, WITH BILIARY STENT REMOVAL AND STONE EXTRACTION;  Surgeon: Omero Lawler MD;  [facility]     HERNIORRHAPHY INGUINAL  2018    Procedure: Herniorrhaphy inguinal;  Surgeon: Emil Echevarria MD;  [facility]     IR LIVER BIOPSY PERCUTANEOUS  2018     LAPAROTOMY EXPLORATORY      per the patient \"for infection in the LLQ\"      TRANSPLANT LIVER RECIPIENT  DONOR N/A 2018    Procedure: TRANSPLANT LIVER RECIPIENT  DONOR;  Surgeon: Emil Echevarria MD;  [facility]     Current Outpatient Medications   Medication     alfuzosin ER (UROXATRAL) 10 MG 24 hr tablet     amLODIPine (NORVASC) 10 MG tablet     aspirin (ASA) 325 MG EC tablet     bisacodyl (DULCOLAX) 10 MG suppository     blood glucose (NO BRAND SPECIFIED) lancets standard     blood glucose (ONETOUCH VERIO IQ) test strip     blood glucose monitoring (NO BRAND SPECIFIED) meter device kit     glucose 40 % (400 mg/mL) gel     Lancets (ONETOUCH DELICA PLUS [id number]) MISC     levothyroxine (SYNTHROID/LEVOTHROID) 75 MCG tablet     losartan (COZAAR) 25 MG tablet     magnesium " hydroxide (MILK OF MAGNESIA) 400 MG/5ML suspension     metFORMIN (GLUCOPHAGE-XR) 500 MG 24 hr tablet     Metoprolol Tartrate 75 MG TABS     mineral oil liquid     polyethylene glycol (MIRALAX) 17 GM/Dose powder     polyethylene glycol (MIRALAX) packet     polyethylene glycol (MIRALAX) powder     Sharps Container MISC     sildenafil (REVATIO) 20 MG tablet     sildenafil (VIAGRA) 100 MG tablet     sitagliptin (JANUVIA) 25 MG tablet     tacrolimus (GENERIC EQUIVALENT) 1 MG capsule     ursodiol (ACTIGALL) 250 MG tablet     Multiple Vitamin (DAILY-STEVE) TABS     No current facility-administered medications for this visit.      No Known Allergies          Review of Systems         Objective    /77 (BP Location: Right arm, Patient Position: Sitting, Cuff Size: Adult Regular)   Pulse 64   Wt 88 kg (194 lb)   SpO2 96%   BMI 30.38 kg/m    Body mass index is 30.38 kg/m .  Physical Exam   GENERAL: healthy, alert and no distress  NECK: no adenopathy, no asymmetry, masses, or scars and thyroid normal to palpation  RESP: lungs clear to auscultation - no rales, rhonchi or wheezes  CV: regular rate and rhythm, normal S1 S2, no S3 or S4, no murmur, click or rub, no peripheral edema and peripheral pulses strong  ABDOMEN: soft, nontender, no hepatosplenomegaly, no masses and bowel sounds normal  MS: no gross musculoskeletal defects noted, no edema

## 2021-02-18 NOTE — NURSING NOTE
Chief Complaint   Patient presents with     RECHECK     three mos follow up        Esme Mcintyre MA, at 12:57 PM on 2/18/2021.

## 2021-02-22 DIAGNOSIS — Z94.4 LIVER TRANSPLANTED (H): ICD-10-CM

## 2021-02-22 RX ORDER — URSODIOL 250 MG/1
250 TABLET, FILM COATED ORAL 2 TIMES DAILY
Qty: 180 TABLET | Refills: 3 | Status: SHIPPED | OUTPATIENT
Start: 2021-02-22 | End: 2022-10-21

## 2021-02-26 ENCOUNTER — ALLIED HEALTH/NURSE VISIT (OUTPATIENT)
Dept: EDUCATION SERVICES | Facility: CLINIC | Age: 66
End: 2021-02-26
Payer: COMMERCIAL

## 2021-02-26 DIAGNOSIS — E11.65 UNCONTROLLED TYPE 2 DIABETES MELLITUS WITH HYPERGLYCEMIA (H): Primary | ICD-10-CM

## 2021-02-26 DIAGNOSIS — E11.9 TYPE 2 DIABETES MELLITUS (H): Primary | ICD-10-CM

## 2021-02-26 PROCEDURE — G0108 DIAB MANAGE TRN  PER INDIV: HCPCS

## 2021-02-26 RX ORDER — PEN NEEDLE, DIABETIC 32GX 5/32"
NEEDLE, DISPOSABLE MISCELLANEOUS
Qty: 200 EACH | Refills: 3 | Status: SHIPPED | OUTPATIENT
Start: 2021-02-26 | End: 2022-01-26

## 2021-02-26 NOTE — Clinical Note
Could I get insulin orders for this patient?  I did teaching on that since the Pasquale showed he is so high.  He is willing to take it depending on cost.  Thanks!  Marcy

## 2021-02-26 NOTE — PATIENT INSTRUCTIONS
1. Your blood sugar is much too high.  It is likely that you will need insulin to get it controlled.  I will contact your doctor and get an order for a long-acting, once a day insulin, Lantus.    2. You have instructions on how to use an insulin pen that I printed out for you today.    3. You also have information about how to treat low blood sugar which is the most common side effect associated with insulin use.  Show this paperwork to your wife.  4.  Be sure that you carry glucose tablets with you when you start insulin.  That way you always have a way to treat low blood sugar when you are away from home.  I gave you a sample of these today.  5. Follow up with me 3/9 at 9:30 for an in person visit    Marcy Wolf RN,[place] [address]  Phone: [phone] or [phone]  [email or web address]    Lantus es dyana insulina de 24 horas.  Se jana dyana vez al día y reduce el nivel de azúcar en natali nancy las 24 horas siguientes.    No provoca un descenso rápido de la glucemia, sino un descenso lento.    El mayor efecto secundario asociado a la insulina es el bajo nivel de azúcar en natali.  Al igual que no queremos que esté demasiado hina, tampoco queremos que esté demasiado baja.  Menos de 70 se considera demasiado bajo.  Si eso ocurre, tienes que cherrie alimentos con carbohidratos.    Tu cuerpo ya está acostumbrado a un nivel de azúcar en natali más alto.  Cuando se acerque a la normalidad, es posible que se sienta bajo (nervioso, tembloroso, sudoroso).  Mida samaniego nivel de azúcar en natali y kennedy en qué punto se encuentra.  Si samaniego nivel de azúcar en natali no es demasiado bajo, intente dejarlo pasar.  Samaniego nivel de azúcar en la natali tiene que acostumbrarse a un nivel normal de azúcar en la natali.    1. Samaniego nivel de azúcar en natali es demasiado alto.  Es probable que necesite insulina para controlarla.  Me pondré en contacto con samaniego médico para que le pida dyana insulina de  acción prolongada, dyana vez al día, Lantus.    2. Tienes instrucciones sobre cómo usar dyana pluma de insulina que he impreso para ti hoy.    3. También tienes información sobre cómo tratar las bajadas de azúcar, que es el efecto secundario más común asociado al uso de la insulina.  Enséñale esta documentación a tu keven.  4.  Asegúrate de llevar contigo pastillas de glucosa cuando empieces a usar la insulina.  De jaimie modo, siempre tendrás dyana forma de tratar los niveles bajos de azúcar en natali cuando estés fuera de casa.  Hoy te he dado dyana muestra de ellas.  5. Siga conmigo el 3/9 a las 9:30 para dyana visita en persona    Marcy Luciano ZAMARRIPA,YULYE  [address] [address]  Teléfono: [phone] o [phone]  [email or web address]      Traducción realizada con la versión gratuita del traductor [email or web address]

## 2021-02-26 NOTE — PROGRESS NOTES
DIABETES EDUCATION NOTE:    Loy Limon presents today for education related to Type 2 diabetes. He forgot his appointment today but writer called him and he came in.   Patient is being treated with:  oral agents  He is accompanied by self    PATIENT CONCERNS RELATED TO DIABETES SELF MANAGEMENT:  No concerns    ASSESSMENT: type 2 diabetes, a1c above target    Current Diabetes Management per Patient:  Taking diabetes medications?   yes:   He is not missing doses  Diabetes Medication(s)     Biguanides       metFORMIN (GLUCOPHAGE-XR) 500 MG 24 hr tablet    Take 2 tablets (1,000 mg) by mouth 2 times daily (with meals)    Dipeptidyl Peptidase-4 (DPP-4) Inhibitors       sitagliptin (JANUVIA) 25 MG tablet    Take 1 tablet (25 mg) by mouth daily    Diabetic Other       glucose 40 % (400 mg/mL) gel    Take 15-30 g by mouth every 15 minutes as needed for low blood sugar        Monitoring  Patient glucose self monitoring as follows: rarely.   This is a follow up on a professional Pasquale.        BG values are: Not in goal  Patient's most recent   Lab Results   Component Value Date    A1C 7.2 01/03/2020    is meeting goal of 8.0%    Activity: no regular exercise program    Nutrition:  He states he has already reduced portion sizes on the foods he knows will spike his blood sugar, tortilla, rice, fruit.    Hypoglycemia:   Patient is at risk of hypoglycemia? No                    EDUCATION and INSTRUCTION PROVIDED AT THIS VISIT:    We talked about his Pasquale reports.  His blood sugar is too high and he can see that on the sensor reports.  He states he is willing to take insulin if he can afford it.   Teaching was done on the Lantus Solostar pen.  He was given directions on Tristanian.  He practiced in the office with the pen and we discussed all issue pertinent to pen use and storage.  Again, written info given in Tristanian.      The hypoglycemia protocol was discussed in detail and he was given glucose tablets to carry in his  pocket.  This was printed in Burmese and he is asked to share all of this with wife.    Patient-stated goal written and given to Loy Limon.  Verbalized and demonstrated understanding of instructions.     PLAN:  1. Contact MD for insulin orders--ordered today Lantus 10 units daily  2. Contact patient through interpretor services  3. Follow up 3/9 at 9:30 am with meter  4  He agrees to check blood glucose 2 times a day  See patient instructions  AVS printed and given to patient    Time spent with patient at today's visit was 40 minutes.      Any diabetes medication dose changes were made via the CDE Protocol and Collaborative Practice Agreement with Darwin and  Sarah.  A copy of this encounter was provided to patient's referring provider.

## 2021-03-10 ENCOUNTER — OFFICE VISIT (OUTPATIENT)
Dept: FAMILY MEDICINE | Facility: CLINIC | Age: 66
End: 2021-03-10
Payer: COMMERCIAL

## 2021-03-10 VITALS
HEIGHT: 67 IN | WEIGHT: 194 LBS | TEMPERATURE: 98 F | OXYGEN SATURATION: 98 % | SYSTOLIC BLOOD PRESSURE: 151 MMHG | RESPIRATION RATE: 16 BRPM | DIASTOLIC BLOOD PRESSURE: 88 MMHG | HEART RATE: 92 BPM | BODY MASS INDEX: 30.45 KG/M2

## 2021-03-10 DIAGNOSIS — R05.9 COUGH: ICD-10-CM

## 2021-03-10 DIAGNOSIS — I10 ESSENTIAL HYPERTENSION: Primary | ICD-10-CM

## 2021-03-10 PROCEDURE — 99213 OFFICE O/P EST LOW 20 MIN: CPT | Performed by: FAMILY MEDICINE

## 2021-03-10 RX ORDER — LOSARTAN POTASSIUM 50 MG/1
50 TABLET ORAL DAILY
Qty: 30 TABLET | Refills: 11 | Status: SHIPPED | OUTPATIENT
Start: 2021-03-10 | End: 2021-09-16

## 2021-03-10 ASSESSMENT — MIFFLIN-ST. JEOR: SCORE: 1613.61

## 2021-03-10 ASSESSMENT — PAIN SCALES - GENERAL: PAINLEVEL: NO PAIN (0)

## 2021-03-10 NOTE — NURSING NOTE
Chief Complaint   Patient presents with     Recheck Medication     Patient comes in for a follow up.         Ridge Post MA on 3/10/2021 at 9:49 AM

## 2021-03-10 NOTE — PATIENT INSTRUCTIONS
[facility] Medication Refill Request Information:  * Please contact your pharmacy regarding ANY request for medication refills.  ** [facility] Prescription Fax = [phone]  * Please allow 3 business days for routine medication refills.  * Please allow 5 business days for controlled substance medication refills.     [facility] Test Result notification information:  *You will be notified with in 7-10 days of your appointment day regarding the results of your test.  If you are on MyChart you will be notified as soon as the provider has reviewed the results and signed off on them.    [facility]: [phone]

## 2021-03-10 NOTE — PROGRESS NOTES
"    Assessment & Plan     Essential hypertension    - losartan (COZAAR) 50 MG tablet; Take 1 tablet (50 mg) by mouth daily    Cough: gone       22 minutes spent on the date of the encounter doing chart review, history and exam, documentation and further activities as noted above  [id number]}     BMI:   Estimated body mass index is 30.38 kg/m  as calculated from the following:    Height as of this encounter: 1.702 m (5' 7\").    Weight as of this encounter: 88 kg (194 lb).     Return in about 3 months (around 6/10/2021).    Jaswinder Anne MD  [facility] [place]    Teddy Granado is a 65 year old who presents for the following health issues  accompanied by his :    HPI Here in f/u  Still feels abd enlarged, sees GI soon I ask him to discuss there  DM: poor mgmt per Feb numbers, per pt he's improving via work w/ endo providers team  BP: we will go up on arb  Cough: gone once off ace  No new c/o    Past Medical History:   Diagnosis Date     Anemia      Biliary stricture of transplanted liver (H) 2018     BPH (benign prostatic hyperplasia)      Cancer (H)     hepatocellualr carcinoma     Cirrhosis of liver (H) 2018     Diabetes (H)      Enlarged prostate      Hypothyroidism      Impotence of organic origin 2020     Inguinal hernia     Repaired with mesh on 18     Liver lesion 2018     Liver transplanted (H) 2018     donor liver transplant     Portal vein thrombosis     on path explant     Postoperative atrial fibrillation (H) 2018     Prostate cancer (H)      TB lung, latent     Treated      Past Surgical History:   Procedure Laterality Date     APPENDECTOMY       COLONOSCOPY           DAVINCI PROSTATECTOMY N/A 1/3/2020    Procedure: Robot-assisted laparoscopic radical prostatectomy, Bladder biopsy;  Surgeon: Kenrick Casiano MD;  [facility]     ENDOSCOPIC RETROGRADE CHOLANGIOPANCREATOGRAM N/A 2018 " "   Procedure: ENDOSCOPIC RETROGRADE CHOLANGIOPANCREATOGRAM, with Billary Sphincterotomy and Billary Stent Placement;  Surgeon: Omero Lawler MD;  [facility]     ENDOSCOPIC RETROGRADE CHOLANGIOPANCREATOGRAM  2019    Procedure: COMBINED ENDOSCOPIC RETROGRADE CHOLANGIOPANCREATOGRAPHY, Bile Duct Stent Exchange;  Surgeon: Omero Lawler MD;  [facility]     ENDOSCOPIC RETROGRADE CHOLANGIOPANCREATOGRAM N/A 2019    Procedure: ENDOSCOPIC RETROGRADE CHOLANGIOPANCREATOGRAPHY, WITH BILIARY STENT REMOVAL AND STONE EXTRACTION;  Surgeon: Omero Lawler MD;  [facility]     HERNIORRHAPHY INGUINAL  2018    Procedure: Herniorrhaphy inguinal;  Surgeon: Emil Echevarria MD;  [facility]     IR LIVER BIOPSY PERCUTANEOUS  2018     LAPAROTOMY EXPLORATORY      per the patient \"for infection in the LLQ\"      TRANSPLANT LIVER RECIPIENT  DONOR N/A 2018    Procedure: TRANSPLANT LIVER RECIPIENT  DONOR;  Surgeon: Emil Echevarria MD;  [facility]     Current Outpatient Medications   Medication     alfuzosin ER (UROXATRAL) 10 MG 24 hr tablet     amLODIPine (NORVASC) 10 MG tablet     aspirin (ASA) 325 MG EC tablet     bisacodyl (DULCOLAX) 10 MG suppository     blood glucose (NO BRAND SPECIFIED) lancets standard     blood glucose (ONETOUCH VERIO IQ) test strip     blood glucose monitoring (NO BRAND SPECIFIED) meter device kit     glucose 40 % (400 mg/mL) gel     insulin glargine (LANTUS SOLOSTAR) 100 UNIT/ML pen     insulin pen needle (BD KIRK U/F) 32G X 4 MM miscellaneous     Lancets (ONETOUCH DELICA PLUS [id number]) MISC     levothyroxine (SYNTHROID/LEVOTHROID) 75 MCG tablet     losartan (COZAAR) 50 MG tablet     magnesium hydroxide (MILK OF MAGNESIA) 400 MG/5ML suspension     metFORMIN (GLUCOPHAGE-XR) 500 MG 24 hr tablet     Metoprolol Tartrate 75 MG TABS     mineral oil liquid     Multiple Vitamin (DAILY-STEVE) TABS     polyethylene glycol " "(MIRALAX) 17 GM/Dose powder     polyethylene glycol (MIRALAX) packet     polyethylene glycol (MIRALAX) powder     Sharps Container MISC     sildenafil (REVATIO) 20 MG tablet     sildenafil (VIAGRA) 100 MG tablet     sitagliptin (JANUVIA) 25 MG tablet     tacrolimus (GENERIC EQUIVALENT) 1 MG capsule     ursodiol (ACTIGALL) 250 MG tablet     No current facility-administered medications for this visit.      No Known Allergies          Review of Systems         Objective    BP (!) 151/88   Pulse 92   Temp 98  F (36.7  C) (Oral)   Resp 16   Ht 1.702 m (5' 7\")   Wt 88 kg (194 lb)   SpO2 98%   BMI 30.38 kg/m    Body mass index is 30.38 kg/m .  Physical Exam   GENERAL: healthy, alert and no distress  NECK: no adenopathy, no asymmetry, masses, or scars and thyroid normal to palpation  RESP: lungs clear to auscultation - no rales, rhonchi or wheezes  CV: regular rate and rhythm, normal S1 S2, no S3 or S4, no murmur, click or rub, no peripheral edema and peripheral pulses strong  ABDOMEN: soft, nontender, no hepatosplenomegaly, no masses and bowel sounds normal  MS: no gross musculoskeletal defects noted, no edema          "

## 2021-03-19 ENCOUNTER — APPOINTMENT (OUTPATIENT)
Dept: INTERPRETER SERVICES | Facility: CLINIC | Age: 66
End: 2021-03-19
Payer: COMMERCIAL

## 2021-03-24 ENCOUNTER — TELEPHONE (OUTPATIENT)
Dept: ENDOCRINOLOGY | Facility: CLINIC | Age: 66
End: 2021-03-24

## 2021-03-24 ENCOUNTER — OFFICE VISIT (OUTPATIENT)
Dept: GASTROENTEROLOGY | Facility: CLINIC | Age: 66
End: 2021-03-24
Attending: INTERNAL MEDICINE
Payer: COMMERCIAL

## 2021-03-24 VITALS
TEMPERATURE: 98.2 F | OXYGEN SATURATION: 96 % | BODY MASS INDEX: 30.57 KG/M2 | HEART RATE: 60 BPM | HEIGHT: 67 IN | SYSTOLIC BLOOD PRESSURE: 153 MMHG | DIASTOLIC BLOOD PRESSURE: 76 MMHG | WEIGHT: 194.8 LBS | RESPIRATION RATE: 18 BRPM

## 2021-03-24 DIAGNOSIS — E03.8 OTHER SPECIFIED HYPOTHYROIDISM: ICD-10-CM

## 2021-03-24 DIAGNOSIS — C61 PROSTATE CANCER (H): ICD-10-CM

## 2021-03-24 DIAGNOSIS — Z13.220 LIPID SCREENING: ICD-10-CM

## 2021-03-24 DIAGNOSIS — R68.82 DECREASED LIBIDO: ICD-10-CM

## 2021-03-24 DIAGNOSIS — N18.2 CKD (CHRONIC KIDNEY DISEASE) STAGE 2, GFR 60-89 ML/MIN: ICD-10-CM

## 2021-03-24 DIAGNOSIS — E09.9 DRUG-INDUCED DIABETES MELLITUS (H): ICD-10-CM

## 2021-03-24 DIAGNOSIS — Z94.4 LIVER REPLACED BY TRANSPLANT (H): ICD-10-CM

## 2021-03-24 DIAGNOSIS — Z94.4 LIVER TRANSPLANT RECIPIENT (H): Primary | Chronic | ICD-10-CM

## 2021-03-24 DIAGNOSIS — E03.9 HYPOTHYROIDISM, UNSPECIFIED TYPE: ICD-10-CM

## 2021-03-24 LAB
ALBUMIN SERPL-MCNC: 3.6 G/DL (ref 3.4–5)
ALP SERPL-CCNC: 149 U/L (ref 40–150)
ALT SERPL W P-5'-P-CCNC: 18 U/L (ref 0–70)
ANION GAP SERPL CALCULATED.3IONS-SCNC: 7 MMOL/L (ref 3–14)
AST SERPL W P-5'-P-CCNC: 6 U/L (ref 0–45)
BILIRUB DIRECT SERPL-MCNC: 0.2 MG/DL (ref 0–0.2)
BILIRUB SERPL-MCNC: 0.8 MG/DL (ref 0.2–1.3)
BUN SERPL-MCNC: 24 MG/DL (ref 7–30)
CALCIUM SERPL-MCNC: 8.9 MG/DL (ref 8.5–10.1)
CHLORIDE SERPL-SCNC: 106 MMOL/L (ref 94–109)
CO2 SERPL-SCNC: 26 MMOL/L (ref 20–32)
CREAT SERPL-MCNC: 0.86 MG/DL (ref 0.66–1.25)
ERYTHROCYTE [DISTWIDTH] IN BLOOD BY AUTOMATED COUNT: 12.4 % (ref 10–15)
GFR SERPL CREATININE-BSD FRML MDRD: 89 ML/MIN/{1.73_M2}
GLUCOSE SERPL-MCNC: 241 MG/DL (ref 70–99)
HBA1C MFR BLD: 10.4 % (ref 0–5.6)
HCT VFR BLD AUTO: 44 % (ref 40–53)
HGB BLD-MCNC: 14.7 G/DL (ref 13.3–17.7)
KAPPA LC UR-MCNC: 3.48 MG/DL (ref 0.33–1.94)
KAPPA LC/LAMBDA SER: 1.22 {RATIO} (ref 0.26–1.65)
LAMBDA LC SERPL-MCNC: 2.86 MG/DL (ref 0.57–2.63)
MAGNESIUM SERPL-MCNC: 1.9 MG/DL (ref 1.6–2.3)
MCH RBC QN AUTO: 28.4 PG (ref 26.5–33)
MCHC RBC AUTO-ENTMCNC: 33.4 G/DL (ref 31.5–36.5)
MCV RBC AUTO: 85 FL (ref 78–100)
PLATELET # BLD AUTO: 205 10E9/L (ref 150–450)
POTASSIUM SERPL-SCNC: 4.5 MMOL/L (ref 3.4–5.3)
PROT SERPL-MCNC: 7.5 G/DL (ref 6.8–8.8)
PSA SERPL-MCNC: <0.01 UG/L (ref 0–4)
RBC # BLD AUTO: 5.18 10E12/L (ref 4.4–5.9)
SODIUM SERPL-SCNC: 140 MMOL/L (ref 133–144)
T4 FREE SERPL-MCNC: 0.96 NG/DL (ref 0.76–1.46)
TACROLIMUS BLD-MCNC: 7.4 UG/L (ref 5–15)
TME LAST DOSE: NORMAL H
TSH SERPL DL<=0.005 MIU/L-ACNC: 9.86 MU/L (ref 0.4–4)
WBC # BLD AUTO: 6.1 10E9/L (ref 4–11)

## 2021-03-24 PROCEDURE — 84439 ASSAY OF FREE THYROXINE: CPT | Performed by: PATHOLOGY

## 2021-03-24 PROCEDURE — 84403 ASSAY OF TOTAL TESTOSTERONE: CPT | Mod: 90 | Performed by: PATHOLOGY

## 2021-03-24 PROCEDURE — 82784 ASSAY IGA/IGD/IGG/IGM EACH: CPT | Mod: 90 | Performed by: PATHOLOGY

## 2021-03-24 PROCEDURE — 80076 HEPATIC FUNCTION PANEL: CPT | Performed by: PATHOLOGY

## 2021-03-24 PROCEDURE — 85027 COMPLETE CBC AUTOMATED: CPT | Performed by: PATHOLOGY

## 2021-03-24 PROCEDURE — 83883 ASSAY NEPHELOMETRY NOT SPEC: CPT | Mod: 90 | Performed by: PATHOLOGY

## 2021-03-24 PROCEDURE — 84165 PROTEIN E-PHORESIS SERUM: CPT | Mod: 90 | Performed by: PATHOLOGY

## 2021-03-24 PROCEDURE — 99215 OFFICE O/P EST HI 40 MIN: CPT | Performed by: INTERNAL MEDICINE

## 2021-03-24 PROCEDURE — 80197 ASSAY OF TACROLIMUS: CPT | Mod: 90 | Performed by: PATHOLOGY

## 2021-03-24 PROCEDURE — 36415 COLL VENOUS BLD VENIPUNCTURE: CPT | Performed by: PATHOLOGY

## 2021-03-24 PROCEDURE — 84153 ASSAY OF PSA TOTAL: CPT | Performed by: PATHOLOGY

## 2021-03-24 PROCEDURE — 99000 SPECIMEN HANDLING OFFICE-LAB: CPT | Performed by: PATHOLOGY

## 2021-03-24 PROCEDURE — 84443 ASSAY THYROID STIM HORMONE: CPT | Performed by: PATHOLOGY

## 2021-03-24 PROCEDURE — 83036 HEMOGLOBIN GLYCOSYLATED A1C: CPT | Performed by: PATHOLOGY

## 2021-03-24 PROCEDURE — 86334 IMMUNOFIX E-PHORESIS SERUM: CPT | Mod: 90 | Performed by: PATHOLOGY

## 2021-03-24 PROCEDURE — 80048 BASIC METABOLIC PNL TOTAL CA: CPT | Performed by: PATHOLOGY

## 2021-03-24 PROCEDURE — 83735 ASSAY OF MAGNESIUM: CPT | Performed by: PATHOLOGY

## 2021-03-24 RX ORDER — LEVOTHYROXINE SODIUM 88 UG/1
88 TABLET ORAL DAILY
Qty: 90 TABLET | Refills: 3 | Status: SHIPPED | OUTPATIENT
Start: 2021-03-24 | End: 2021-07-28

## 2021-03-24 ASSESSMENT — PAIN SCALES - GENERAL: PAINLEVEL: NO PAIN (0)

## 2021-03-24 ASSESSMENT — MIFFLIN-ST. JEOR: SCORE: 1617.24

## 2021-03-24 NOTE — LETTER
3/24/2021         RE: Loy Limon  [address]  [facility] [address]        Dear Colleague,    Thank you for referring your patient, Loy Limon, to the [facility] [place]. Please see a copy of my visit note below.    [facility]    Hepatology follow-up    CHIEF COMPLAINT AND REASON FOR THE VISIT:  Status post liver transplantation.      SUBJECTIVE:  Mr. Loy Limon is a 65-year-old male who is an immigrant from [place].  He speaks English but prefers to communicate with us through an , which we did.  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  He had the hepatocellular carcinoma diagnosed in 03/2018 and received a liver transplantation on 12/22/2018.  He also had that liver lesion treated with microwave ablation, although he had viable tumor on the explant.  Please note that his last MRI did not show any recurrence of the hepatocellular carcinoma.  In fact, it was done on 02/01/2020 and no suspicious liver lesion is identified.  Mr. Limon also had prostate cancer, for which he follows with Urology and this was also treated and, so far, we do not have any signs of recurrence.  Mr. Limon always, since he got the transplantation, complains of abdominal distention, which appears to be bloating.  He also gained weight, mostly in his abdomen, but has also some discomfort associated with that.  Does not have any nausea or vomiting.  He is moving his bowels like twice a day with no blood in it.  Otherwise, he has minimal lower extremity edema.  He claims to be physically very active and sleeps well.  He did get his vaccination with Pfizer before and he is getting the second dose on 03/29/2021.     Medical hx Surgical hx   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/28/2018     BPH (benign  "prostatic hyperplasia)      Cancer (H)     hepatocellualr carcinoma     Cirrhosis of liver (H) 2018     Diabetes (H)      Enlarged prostate      Hypothyroidism      Impotence of organic origin 2020     Inguinal hernia     Repaired with mesh on 18     Liver lesion 2018     Liver transplanted (H) 2018     donor liver transplant     Portal vein thrombosis     on path explant     Postoperative atrial fibrillation (H) 2018     Prostate cancer (H)      TB lung, latent     Treated       Past Surgical History:   Procedure Laterality Date     APPENDECTOMY       COLONOSCOPY           DAVINCI PROSTATECTOMY N/A 1/3/2020    Procedure: Robot-assisted laparoscopic radical prostatectomy, Bladder biopsy;  Surgeon: Kenrick Casiano MD;  [facility]     ENDOSCOPIC RETROGRADE CHOLANGIOPANCREATOGRAM N/A 2018    Procedure: ENDOSCOPIC RETROGRADE CHOLANGIOPANCREATOGRAM, with Billary Sphincterotomy and Billary Stent Placement;  Surgeon: Omero Lawler MD;  [facility]     ENDOSCOPIC RETROGRADE CHOLANGIOPANCREATOGRAM  2019    Procedure: COMBINED ENDOSCOPIC RETROGRADE CHOLANGIOPANCREATOGRAPHY, Bile Duct Stent Exchange;  Surgeon: Omero Lawler MD;  [facility]     ENDOSCOPIC RETROGRADE CHOLANGIOPANCREATOGRAM N/A 2019    Procedure: ENDOSCOPIC RETROGRADE CHOLANGIOPANCREATOGRAPHY, WITH BILIARY STENT REMOVAL AND STONE EXTRACTION;  Surgeon: Omero Lawler MD;  [facility]     HERNIORRHAPHY INGUINAL  2018    Procedure: Herniorrhaphy inguinal;  Surgeon: Emil Echevarria MD;  [facility]     IR LIVER BIOPSY PERCUTANEOUS  2018     LAPAROTOMY EXPLORATORY      per the patient \"for infection in the LLQ\"      TRANSPLANT LIVER RECIPIENT  DONOR N/A 2018    Procedure: TRANSPLANT LIVER RECIPIENT  DONOR;  Surgeon: Emil Echevarria MD;  [facility]          Medications  Prior to Admission " medications    Medication Sig Start Date End Date Taking? Authorizing Provider   alfuzosin ER (UROXATRAL) 10 MG 24 hr tablet  12/4/20  Yes Reported, Patient   amLODIPine (NORVASC) 10 MG tablet Take 1 tablet (10 mg) by mouth daily 1/8/21  Yes Jaswinder Anne MD   aspirin (ASA) 325 MG EC tablet Take 1 tablet (325 mg) by mouth daily 5/8/20  Yes Jaswinder Anne MD   bisacodyl (DULCOLAX) 10 MG suppository Place 1 suppository (10 mg) rectally daily For constipation. Stop if diarrhea occurs. 1/5/20  Yes Rob Parker MD   blood glucose (NO BRAND SPECIFIED) lancets standard Use to test blood sugar 4 times daily or as directed. 2/4/21  Yes Jaswinder Anne MD   blood glucose (ONETOUCH VERIO IQ) test strip Use to test blood sugar 2 times daily or as directed. 2/3/21  Yes Jaswinder Anne MD   blood glucose monitoring (NO BRAND SPECIFIED) meter device kit Use to test blood sugar 2 times daily or as directed. Please let the pt know when it is ready to . 2/2/21  Yes Jaswinder Anne MD   glucose 40 % (400 mg/mL) gel Take 15-30 g by mouth every 15 minutes as needed for low blood sugar 1/21/20  Yes Jaswinder Anne MD   insulin glargine (LANTUS SOLOSTAR) 100 UNIT/ML pen Inject 10 units subcutaneous daily. 2/26/21  Yes Gilma Guthrie PA-C   insulin pen needle (BD KIRK U/F) 32G X 4 MM miscellaneous Use 1 daily. 2/26/21  Yes Gilma Guthrie PA-C   Lancets (ONETOUCH DELICA PLUS [id number]) MISC U TO TEST QID OR UTD 3/3/20  Yes Reported, Patient   levothyroxine (SYNTHROID/LEVOTHROID) 75 MCG tablet Take 1 tablet (75 mcg) by mouth daily 9/30/20  Yes Gilma Guthrie PA-C   losartan (COZAAR) 50 MG tablet Take 1 tablet (50 mg) by mouth daily 3/10/21  Yes Jaswinder Anne MD   magnesium hydroxide (MILK OF MAGNESIA) 400 MG/5ML suspension Take 30 mLs by mouth daily as needed for constipation or heartburn (Stop if diarrhea occurs) 1/4/20  Yes Rob Parker MD   metFORMIN (GLUCOPHAGE-XR)  "500 MG 24 hr tablet Take 2 tablets (1,000 mg) by mouth 2 times daily (with meals) 1/7/21  Yes Jaswinder Anne MD   Metoprolol Tartrate 75 MG TABS Take 75 mg by mouth 2 times daily 2/9/21  Yes Jaswinder Anne MD   mineral oil liquid Take 30 mLs by mouth daily For constipation. Stop if diarrhea occurs. 1/5/20  Yes Rob Parker MD   Multiple Vitamin (DAILY-STEVE) TABS Take 1 tablet by mouth daily 1/8/21  Yes Jaswinder Anne MD   polyethylene glycol (MIRALAX) 17 GM/Dose powder Take 17 g (1 capful) by mouth daily 9/25/20  Yes Jaswinder Anne MD   Sharps Container MISC 1 each daily 1/7/19  Yes Inez Baker APRN CNP   sildenafil (REVATIO) 20 MG tablet Take 3-5 tablets ( mg) by mouth daily as needed (1/2 hour prior to sexual activity) 7/7/20  Yes Nuvia Pascual PA   sildenafil (VIAGRA) 100 MG tablet Take 1/2 to 1 full pill by mouth 1/2 hour before sexual activity 9/25/20  Yes Jaswinder Anne MD   sitagliptin (JANUVIA) 25 MG tablet Take 1 tablet (25 mg) by mouth daily 2/8/21  Yes Sue Tavares MD   tacrolimus (GENERIC EQUIVALENT) 1 MG capsule Take 2 capsules (2 mg) by mouth every 12 hours 9/25/20  Yes Tamela Carballo MD   ursodiol (ACTIGALL) 250 MG tablet Take 1 tablet (250 mg) by mouth 2 times daily 2/22/21  Yes Tamela Carballo MD   polyethylene glycol (MIRALAX) packet Take 17 g by mouth daily  Patient not taking: Reported on 3/24/2021 2/18/20   Nuvia Pascual PA   polyethylene glycol (MIRALAX) powder MIX 17 GRAMS AND DRINK D 2/18/20   Reported, Patient       Allergies  No Known Allergies    Review of systems  A 10-point review of systems was negative    Examination  BP (!) 153/76 (BP Location: Right arm, Patient Position: Sitting, Cuff Size: Adult Regular)   Pulse 60   Temp 98.2  F (36.8  C) (Oral)   Resp 18   Ht 1.702 m (5' 7\")   Wt 88.4 kg (194 lb 12.8 oz)   SpO2 96%   BMI 30.51 kg/m    Body mass index is 30.51 kg/m .    Gen- " well, NAD, A+Ox3, normal color  Lym- no palpable LAD  CVS- RRR  RS- CTA  Abd-Healed surgical scars. Obese and not tender.   Extr- hands normal, no OSKAR  Skin- no rash or jaundice  Neuro- no asterixis  Psych- normal mood    Laboratory  Lab Results   Component Value Date     03/24/2021    POTASSIUM 4.5 03/24/2021    CHLORIDE 106 03/24/2021    CO2 26 03/24/2021    BUN 24 03/24/2021    CR 0.86 03/24/2021       Lab Results   Component Value Date    BILITOTAL 0.8 03/24/2021    ALT 18 03/24/2021    AST 6 03/24/2021    ALKPHOS 149 03/24/2021       Lab Results   Component Value Date    ALBUMIN 3.6 03/24/2021    PROTTOTAL 7.5 03/24/2021        Lab Results   Component Value Date    WBC 6.1 03/24/2021    HGB 14.7 03/24/2021    MCV 85 03/24/2021     03/24/2021       Lab Results   Component Value Date    INR 1.14 12/31/2018       Radiology    ASSESSMENT AND PLAN:  Liver transplantation.        Mr. 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        He is doing quite well.  His last MRI, as indicated above, did not show any recurrence.  He will follow with Oncology as previously scheduled.  He has also a history of prostate cancer and had surgery.  He follows with Urology about a question of incontinence.  Otherwise, he shows also no recurrence and will follow with Dr. Melgar also for that.        As far as his uncontrolled diabetes is concerned, he was seen in Endocrinology here by Dr. Jaswinder Anne and his last hemoglobin A1c was elevated.        Otherwise, he has seen Dermatology and no concerning lesions were identified.  He will be seen here in a year.      This was a 40-minute visit, of which more than 50% was spent in explaining to the patient what our plan of care was.  We answered all his questions.       Tamela Carballo MD  Hepatology  [facility]

## 2021-03-24 NOTE — PROGRESS NOTES
[facility]    Hepatology follow-up    CHIEF COMPLAINT AND REASON FOR THE VISIT:  Status post liver transplantation.      SUBJECTIVE:  Mr. Loy Limon is a 65-year-old male who is an immigrant from [place].  He speaks English but prefers to communicate with us through an , which we did.  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  He had the hepatocellular carcinoma diagnosed in 03/2018 and received a liver transplantation on 12/22/2018.  He also had that liver lesion treated with microwave ablation, although he had viable tumor on the explant.  Please note that his last MRI did not show any recurrence of the hepatocellular carcinoma.  In fact, it was done on 02/01/2020 and no suspicious liver lesion is identified.  Mr. Limon also had prostate cancer, for which he follows with Urology and this was also treated and, so far, we do not have any signs of recurrence.  Mr. Limon always, since he got the transplantation, complains of abdominal distention, which appears to be bloating.  He also gained weight, mostly in his abdomen, but has also some discomfort associated with that.  Does not have any nausea or vomiting.  He is moving his bowels like twice a day with no blood in it.  Otherwise, he has minimal lower extremity edema.  He claims to be physically very active and sleeps well.  He did get his vaccination with Pfizer before and he is getting the second dose on 03/29/2021.     Medical hx Surgical hx   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/28/2018     BPH (benign prostatic hyperplasia)      Cancer (H)     hepatocellualr carcinoma     Cirrhosis of liver (H) 05/08/2018     Diabetes (H)      Enlarged prostate      Hypothyroidism      Impotence of organic origin 09/25/2020     Inguinal hernia     Repaired with mesh on 12/22/18     Liver lesion  "2018     Liver transplanted (H) 2018     donor liver transplant     Portal vein thrombosis     on path explant     Postoperative atrial fibrillation (H) 2018     Prostate cancer (H)      TB lung, latent     Treated       Past Surgical History:   Procedure Laterality Date     APPENDECTOMY       COLONOSCOPY      2018     DAVINCI PROSTATECTOMY N/A 1/3/2020    Procedure: Robot-assisted laparoscopic radical prostatectomy, Bladder biopsy;  Surgeon: Kenrick Casiano MD;  [facility]     ENDOSCOPIC RETROGRADE CHOLANGIOPANCREATOGRAM N/A 2018    Procedure: ENDOSCOPIC RETROGRADE CHOLANGIOPANCREATOGRAM, with Billary Sphincterotomy and Billary Stent Placement;  Surgeon: Omero Lawler MD;  [facility]     ENDOSCOPIC RETROGRADE CHOLANGIOPANCREATOGRAM  2019    Procedure: COMBINED ENDOSCOPIC RETROGRADE CHOLANGIOPANCREATOGRAPHY, Bile Duct Stent Exchange;  Surgeon: Omero Lawler MD;  [facility] OR     ENDOSCOPIC RETROGRADE CHOLANGIOPANCREATOGRAM N/A 2019    Procedure: ENDOSCOPIC RETROGRADE CHOLANGIOPANCREATOGRAPHY, WITH BILIARY STENT REMOVAL AND STONE EXTRACTION;  Surgeon: Omero Lawler MD;  [facility]     HERNIORRHAPHY INGUINAL  2018    Procedure: Herniorrhaphy inguinal;  Surgeon: Emil Echevarria MD;  [facility]     IR LIVER BIOPSY PERCUTANEOUS  2018     LAPAROTOMY EXPLORATORY      per the patient \"for infection in the LLQ\"      TRANSPLANT LIVER RECIPIENT  DONOR N/A 2018    Procedure: TRANSPLANT LIVER RECIPIENT  DONOR;  Surgeon: Emil Echevarria MD;  [facility]          Medications  Prior to Admission medications    Medication Sig Start Date End Date Taking? Authorizing Provider   alfuzosin ER (UROXATRAL) 10 MG 24 hr tablet  20  Yes Reported, Patient   amLODIPine (NORVASC) 10 MG tablet Take 1 tablet (10 mg) by mouth daily 21  Yes Jaswinder Anne MD   aspirin (ASA) 325 MG EC " tablet Take 1 tablet (325 mg) by mouth daily 5/8/20  Yes Jaswinder Anne MD   bisacodyl (DULCOLAX) 10 MG suppository Place 1 suppository (10 mg) rectally daily For constipation. Stop if diarrhea occurs. 1/5/20  Yes Rob Parker MD   blood glucose (NO BRAND SPECIFIED) lancets standard Use to test blood sugar 4 times daily or as directed. 2/4/21  Yes Jaswinder Anne MD   blood glucose (ONETOUCH VERIO IQ) test strip Use to test blood sugar 2 times daily or as directed. 2/3/21  Yes Jaswinder Anne MD   blood glucose monitoring (NO BRAND SPECIFIED) meter device kit Use to test blood sugar 2 times daily or as directed. Please let the pt know when it is ready to . 2/2/21  Yes Jaswinder Anne MD   glucose 40 % (400 mg/mL) gel Take 15-30 g by mouth every 15 minutes as needed for low blood sugar 1/21/20  Yes Jaswinder Anne MD   insulin glargine (LANTUS SOLOSTAR) 100 UNIT/ML pen Inject 10 units subcutaneous daily. 2/26/21  Yes Gilma Guthrie PA-C   insulin pen needle (BD KIRK U/F) 32G X 4 MM miscellaneous Use 1 daily. 2/26/21  Yes Gilma Guthrie PA-C   Lancets (ONETOUCH DELICA PLUS [id number]) MISC U TO TEST QID OR UTD 3/3/20  Yes Reported, Patient   levothyroxine (SYNTHROID/LEVOTHROID) 75 MCG tablet Take 1 tablet (75 mcg) by mouth daily 9/30/20  Yes Gilma Guthrie PA-C   losartan (COZAAR) 50 MG tablet Take 1 tablet (50 mg) by mouth daily 3/10/21  Yes Jaswinder Anne MD   magnesium hydroxide (MILK OF MAGNESIA) 400 MG/5ML suspension Take 30 mLs by mouth daily as needed for constipation or heartburn (Stop if diarrhea occurs) 1/4/20  Yes Rob Parker MD   metFORMIN (GLUCOPHAGE-XR) 500 MG 24 hr tablet Take 2 tablets (1,000 mg) by mouth 2 times daily (with meals) 1/7/21  Yes Jaswinder Anne MD   Metoprolol Tartrate 75 MG TABS Take 75 mg by mouth 2 times daily 2/9/21  Yes Jaswinder Anne MD   mineral oil liquid Take 30 mLs by mouth daily For constipation. Stop  "if diarrhea occurs. 1/5/20  Yes Rob Parker MD   Multiple Vitamin (DAILY-STEVE) TABS Take 1 tablet by mouth daily 1/8/21  Yes Jaswinder Anne MD   polyethylene glycol (MIRALAX) 17 GM/Dose powder Take 17 g (1 capful) by mouth daily 9/25/20  Yes Jaswinder Anne MD   Sharps Container MISC 1 each daily 1/7/19  Yes Inez Baker APRN CNP   sildenafil (REVATIO) 20 MG tablet Take 3-5 tablets ( mg) by mouth daily as needed (1/2 hour prior to sexual activity) 7/7/20  Yes Nuvia Pascual PA   sildenafil (VIAGRA) 100 MG tablet Take 1/2 to 1 full pill by mouth 1/2 hour before sexual activity 9/25/20  Yes Jaswinder Anne MD   sitagliptin (JANUVIA) 25 MG tablet Take 1 tablet (25 mg) by mouth daily 2/8/21  Yes Sue Tavares MD   tacrolimus (GENERIC EQUIVALENT) 1 MG capsule Take 2 capsules (2 mg) by mouth every 12 hours 9/25/20  Yes Tamela Carballo MD   ursodiol (ACTIGALL) 250 MG tablet Take 1 tablet (250 mg) by mouth 2 times daily 2/22/21  Yes Tamlea Carballo MD   polyethylene glycol (MIRALAX) packet Take 17 g by mouth daily  Patient not taking: Reported on 3/24/2021 2/18/20   Nuvia Pascual PA   polyethylene glycol (MIRALAX) powder MIX 17 GRAMS AND DRINK D 2/18/20   Reported, Patient       Allergies  No Known Allergies    Review of systems  A 10-point review of systems was negative    Examination  BP (!) 153/76 (BP Location: Right arm, Patient Position: Sitting, Cuff Size: Adult Regular)   Pulse 60   Temp 98.2  F (36.8  C) (Oral)   Resp 18   Ht 1.702 m (5' 7\")   Wt 88.4 kg (194 lb 12.8 oz)   SpO2 96%   BMI 30.51 kg/m    Body mass index is 30.51 kg/m .    Gen- well, NAD, A+Ox3, normal color  Lym- no palpable LAD  CVS- RRR  RS- CTA  Abd-Healed surgical scars. Obese and not tender.   Extr- hands normal, no OSKAR  Skin- no rash or jaundice  Neuro- no asterixis  Psych- normal mood    Laboratory  Lab Results   Component Value Date     03/24/2021    " POTASSIUM 4.5 03/24/2021    CHLORIDE 106 03/24/2021    CO2 26 03/24/2021    BUN 24 03/24/2021    CR 0.86 03/24/2021       Lab Results   Component Value Date    BILITOTAL 0.8 03/24/2021    ALT 18 03/24/2021    AST 6 03/24/2021    ALKPHOS 149 03/24/2021       Lab Results   Component Value Date    ALBUMIN 3.6 03/24/2021    PROTTOTAL 7.5 03/24/2021        Lab Results   Component Value Date    WBC 6.1 03/24/2021    HGB 14.7 03/24/2021    MCV 85 03/24/2021     03/24/2021       Lab Results   Component Value Date    INR 1.14 12/31/2018       Radiology    ASSESSMENT AND PLAN:  Liver transplantation.        Mr. 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        He is doing quite well.  His last MRI, as indicated above, did not show any recurrence.  He will follow with Oncology as previously scheduled.  He has also a history of prostate cancer and had surgery.  He follows with Urology about a question of incontinence.  Otherwise, he shows also no recurrence and will follow with Dr. Melgar also for that.        As far as his uncontrolled diabetes is concerned, he was seen in Endocrinology here by Dr. Jaswinder Anne and his last hemoglobin A1c was elevated.        Otherwise, he has seen Dermatology and no concerning lesions were identified.  He will be seen here in a year.      This was a 40-minute visit, of which more than 50% was spent in explaining to the patient what our plan of care was.  We answered all his questions.       Tamela Carballo MD  Hepatology  [facility]

## 2021-03-24 NOTE — NURSING NOTE
"Chief Complaint   Patient presents with     RECHECK     S/P Liver TX        Vital signs:  Temp: 98.2  F (36.8  C) Temp src: Oral BP: (!) 153/76 Pulse: 60   Resp: 18 SpO2: 96 %     Height: 170.2 cm (5' 7\") Weight: 88.4 kg (194 lb 12.8 oz)  Estimated body mass index is 30.51 kg/m  as calculated from the following:    Height as of this encounter: 1.702 m (5' 7\").    Weight as of this encounter: 88.4 kg (194 lb 12.8 oz).        Joyce Ruvalcaba, [facility]  3/24/2021 8:25 AM        "

## 2021-03-25 ENCOUNTER — DOCUMENTATION ONLY (OUTPATIENT)
Dept: CARE COORDINATION | Facility: CLINIC | Age: 66
End: 2021-03-25

## 2021-03-25 LAB
ALBUMIN SERPL ELPH-MCNC: 4.1 G/DL (ref 3.7–5.1)
ALPHA1 GLOB SERPL ELPH-MCNC: 0.3 G/DL (ref 0.2–0.4)
ALPHA2 GLOB SERPL ELPH-MCNC: 0.8 G/DL (ref 0.5–0.9)
B-GLOBULIN SERPL ELPH-MCNC: 1 G/DL (ref 0.6–1)
GAMMA GLOB SERPL ELPH-MCNC: 1 G/DL (ref 0.7–1.6)
IGA SERPL-MCNC: 324 MG/DL (ref 84–499)
IGG SERPL-MCNC: 1064 MG/DL (ref 610–1616)
IGM SERPL-MCNC: 39 MG/DL (ref 35–242)
M PROTEIN SERPL ELPH-MCNC: 0 G/DL
PROT PATTERN SERPL ELPH-IMP: NORMAL
PROT PATTERN SERPL IFE-IMP: NORMAL
TESTOST SERPL-MCNC: 296 NG/DL (ref 240–950)

## 2021-03-30 NOTE — PHARMACY-ADMISSION MEDICATION HISTORY
[address]    Progress note- Cardiology    Daniel Pulaski 66 y o  male MRN: [id number]  Unit/Bed#: -01 Encounter: [id number]    Code Status: Level 3 - DNAR and DNI  Reason for Admission / Principal Problem:  Shortness of breath and bilateral lower extremity swelling  Reason for Consult:  Acute on chronic heart failure exacerbation      A/P:    Acute on chronic heart failure exacerbation- EF 60%  Acute respiratory failure from heart failure exacerbation  Type 2 diabetes  CAD status post CABG in 2016- LIMA to LAD SVG to OM 3 and SVG to PDA  Paroxysmal Afib  Left upper lobe pulmonary nodule, pleural plaques  LUCIA- recently noncompliant with CPAP  Obesity  Mild-to-moderate TR based on recent echo        1  Acute on chronic heart failure exacerbation  Patient presenting with bilateral lower extremity swelling and shortness of breath  Likely noncompliance with diet  NT pro  although has JVD and bilateral lower extremity swelling 2+  Home regimen includes Lasix 40 mg daily  Home weight 325 lbs  On presentation was 331 lbs  Plan:  · Will continue IV Lasix 80 mg t i d  Plan to switch back to home dose on discharge  · Home medications lisinopril and beta-blocker on hold given hypotension over the weekend  Will restart lisinopril on discharge and continue to hold Toprol XL  · Patient loaded with digoxin on 03/28 and started on maintenance digoxin 125 mcg daily on 3/29 for AFib  · Strict ins and outs  · Daily weights  Wt seems to be improving now 316 lbs  · Continue to monitor renal function and electrolytes daily  Replete as needed  · On nasal cannula to keep oxygen >92%  Will need home oxygen eval  Plan communicated with primary team    · Likely dc today/tomorrow from cardiology standpoint  · Will set up outpatient appt with cardiology             2  CAD status post CABG in 2016 LIMA to LAD SVG to OM 3 and SVG Admission medication history interview status for the 12/21/2018 admission is complete. See Epic admission navigator for allergy information, pharmacy, prior to admission medications and immunization status.     Medication history interview sources:  Patient, Outside Meds Tab    Changes made to PTA medication list (reason)  Added: None  Deleted: None  Changed: Xifaxan 550mg (1 BID --> 2 daily)    Additional medication history information (including reliability of information, actions taken by pharmacist):  -This interview was done using an .  -Patient was an ok historian of his medications.  -Stated that he takes all of his scheduled medications once daily, including the xifaxan which he stated he takes 2 of those once.  -Reports using tramadol on an as needed basis.      Prior to Admission medications    Medication Sig Last Dose Taking? Auth Provider   alfuzosin (UROXATRAL) 10 MG 24 hr tablet Take 10 mg by mouth daily  Yes Reported, Patient   nadolol (CORGARD) 20 MG tablet Take 20 mg by mouth daily  Yes Unknown, Entered By History   omeprazole (PRILOSEC) 20 MG CR capsule Take 20 mg by mouth daily  Yes Reported, Patient   rifampin (RIFADIN) 300 MG capsule Take 2 capsules (600 mg) by mouth daily  Yes Pauline Clancy MD   rifaximin (XIFAXAN) 550 MG TABS tablet Take 1,100 mg by mouth daily  Yes Unknown, Entered By History   traMADol (ULTRAM) 50 MG tablet Take one po bid prn severe pain  Yes Jaswinder Anne MD     Medication history completed by: Richard Lozada, Pharmacy Intern    Kim Vizcaino, Pharm.D., [facility]  Pager [phone]       to PDA :  Continue Lipitor 40 mg daily, ezetimibe 10 mg daily  3  Paroxysmal Afib:  Toprol  mg in the morning and 50 mg in the afternoon stopped given hypotension over the weekend  Now on digoxin 125 mcg daily  On Eliquis 5 mg b i d for anticoagulation  4  Recent echo showing mild-to-moderate TR:  Likely from right-sided volume overload and possibly noncompliance with CPAP for his LUCIA contributing  5  LUCIA:  Admits that he has not been using his CPAP since it is not working  Patient educated on compliance with it and was asked to call PCP to order a new one  Subjective: Chandrika Funes is a 66 y o  male with past medical history of CAD status post CABG LIMA to LAD SVG to OM 3 and SVG to PDA, paroxysmal AFib , mild-to-moderate TR, LUCIA on CPAP, type 2 diabetes, left pulmonary nodule, pleural plaques, obesity who presents with shortness of breath and bilateral lower extremity swelling  Patient was at his outpatient cardiology office where he was noticed to be short of breath and worsening lower extremity swelling  He was sent to ER for further evaluation  Patient has been admitted for acute on chronic heart failure exacerbation likely in setting of noncompliance with medications, diet and CPAP not working          PMH:  Past Medical History:   Diagnosis Date    A-fib [facility])     AAA (abdominal aortic aneurysm) ([facility] )     Actinic keratosis of right temple 7/31/2020    Actinic keratosis of scalp 7/31/2020    Arthritis     Lay's esophagus     Bladder cancer (HCC)     CAD (coronary artery disease)     Chronic low back pain     Chronic pain of both knees 7/31/2020    Colitis 12/4/2020    Diabetes ([facility] )     Excessive gas 12/3/2020    Hyperlipemia     Hypertension     Immunization deficiency 10/30/2020    Hep a nonimmune    Left lower quadrant abdominal pain 12/3/2020    Mass of right adrenal gland ([facility] ) 12/4/2020    4 1 cm    Nonimmune to hepatitis B virus 10/30/2020    LUCIA (obstructive sleep apnea) 2021        PSH:  Past Surgical History:   Procedure Laterality Date    BACK SURGERY      BLADDER SURGERY      CATARACT EXTRACTION, BILATERAL      COLONOSCOPY  2019    next 2022 [address] CORONARY ARTERY BYPASS GRAFT  2016    Quadruple per Rita    EGD  2017    TOTAL HIP ARTHROPLASTY Right 2012    TOTAL SHOULDER REPLACEMENT Right 2013       Allergies: No Known Allergies    Family History:   Family History   Problem Relation Age of Onset    Heart disease Father     Arthritis Father     Heart attack Father     Alzheimer's disease Mother        Social History:   Social History     Tobacco Use   Smoking Status Former Smoker    Packs/day: 0 25    Types: Cigarettes    Quit date: 2009    Years since quittin 2   Smokeless Tobacco Never Used   Tobacco Comment    smoked since age 20; per Sydnie Perry      Social History     Substance and Sexual Activity   Alcohol Use Not Currently    Comment: No use      Social History     Substance and Sexual Activity   Drug Use Never    Comment: No use        Home Medications:   Prior to Admission medications    Medication Sig Start Date End Date Taking?  Authorizing Provider   atorvastatin (LIPITOR) 40 mg tablet Take 1 tablet (40 mg total) by mouth daily 9/10/20 3/25/21  Bryson Shea MD   Eliquis 5 MG TAKE 1 TABLET BY MOUTH TWICE A DAY 21   Bryson Shea MD   ezetimibe (ZETIA) 10 mg tablet Take 1 tablet (10 mg total) by mouth daily 10/15/20   Flori Lopez MD   finasteride (PROSCAR) 5 mg tablet Take 1 tablet (5 mg total) by mouth daily 20   Keny Giordano MD   Fluad Quadrivalent 0 5 ML [facility] ADMINISTERED 10/13/20   Historical Provider, MD   furosemide (LASIX) 40 mg tablet Take 1 tablet (40 mg total) by mouth daily 10/30/20 3/25/21  Bryson Shea MD   glipiZIDE (GLUCOTROL XL) 5 mg 24 hr tablet Take 1 tablet (5 mg total) by mouth daily 3/17/21   MD Dave Oliver FlexTouch 100 units/mL injection pen Inject under the skin 65 units in the morning and at bedtime 3/17/21   Wilfredo Morales MD   lisinopril (ZESTRIL) 20 mg tablet Take 1 tablet (20 mg total) by mouth daily 10/26/20   Gilberto Montgomery MD   metFORMIN (GLUCOPHAGE) 1000 MG tablet Take 500 mg by mouth 2 (two) times a day  2/17/19   Historical Provider, MD   metoprolol succinate (TOPROL-XL) 100 mg 24 hr tablet Take 1 tablet (100 mg total) by mouth daily 2/18/21   Bryson Shea MD   metoprolol succinate (TOPROL-XL) 50 mg 24 hr tablet Take 1 tablet (50 mg total) by mouth daily 50 mg daily in afternoon 2/3/21   Bryson Shea MD   omeprazole (PriLOSEC) 20 mg delayed release capsule Take 1 capsule (20 mg total) by mouth daily 10/26/20 3/25/21  Dolph KACEY De La Cruz   Unifine Pentips 31G X 8 MM MISC Inject under the skin daily Use as directed 9/4/20   Bryson Shea MD       ROS:   Review of Systems   Constitutional: Negative for activity change, chills, diaphoresis, fatigue and fever  HENT: Negative for congestion, rhinorrhea, sinus pressure and sinus pain  Respiratory: Negative for apnea, cough, shortness of breath and stridor  Cardiovascular: Negative for chest pain, palpitations and leg swelling  Gastrointestinal: Negative for abdominal distention, abdominal pain, blood in stool, constipation and diarrhea  Endocrine: Negative for cold intolerance, heat intolerance and polyuria  Genitourinary: Negative for difficulty urinating  Musculoskeletal: Negative for arthralgias, back pain, joint swelling and myalgias  Skin: Negative for color change, pallor, rash and wound  Neurological: Negative for dizziness, seizures, syncope, light-headedness, numbness and headaches  Psychiatric/Behavioral: Negative for agitation and hallucinations  The patient is not nervous/anxious and is not hyperactive          Vitals:   Vitals:    03/30/21 0144 03/30/21 0414 03/30/21 0543 03/30/21 0734   BP:   139/68 137/71   Pulse: Resp:    16   Temp:    98 1 °F (36 7 °C)   TempSrc:       SpO2: 95% 94%     Weight:   (!) 143 kg (316 lb 3 2 oz)    Height:         Temp  Min: 97 1 °F (36 2 °C)  Max: 98 5 °F (36 9 °C)  IBW: 77 6 kg  Body mass index is 42 88 kg/m²  PHYSICAL EXAM:  Physical Exam  Constitutional:       General: He is not in acute distress  Appearance: He is not diaphoretic  HENT:      Head: Normocephalic and atraumatic  Nose: Nose normal       Mouth/Throat:      Pharynx: No oropharyngeal exudate  Eyes:      General: No scleral icterus  Conjunctiva/sclera: Conjunctivae normal    Neck:      Musculoskeletal: Neck supple  Thyroid: No thyromegaly  Vascular: No JVD  Trachea: No tracheal deviation  Cardiovascular:      Rate and Rhythm: Normal rate  Heart sounds: Normal heart sounds  No murmur  No friction rub  No gallop  Comments: JVD and b/l leg swelling which seems to be improving significantly compared to admission  Pulmonary:      Effort: Pulmonary effort is normal  No respiratory distress  Breath sounds: Normal breath sounds  No stridor  No wheezing or rales  Chest:      Chest wall: No tenderness  Abdominal:      General: Bowel sounds are normal  There is no distension  Palpations: Abdomen is soft  There is no mass  Tenderness: There is no abdominal tenderness  There is no guarding  Skin:     Coloration: Skin is not pale  Findings: No erythema or rash           Labs:   Results from last 7 days   Lab Units 03/30/21  0532 03/29/21  0607 03/26/21  0655   WBC Thousand/uL 7 38 7 67 9 67   HEMOGLOBIN g/dL 12 4 11 5* 12 3   HEMATOCRIT % 42 5 39 5 42 2   PLATELETS Thousands/uL 194 184 216   NEUTROS PCT % 68 68  --    MONOS PCT % 12 12  --      Results from last 7 days   Lab Units 03/30/21  0532 03/29/21  0607 03/28/21  0445 03/27/21  0447 03/25/21  1857   POTASSIUM mmol/L 3 9 3 8 4 0 3 9 3 7   CHLORIDE mmol/L 97* 98* 97* 99* 102   CO2 mmol/L 38* 40* 39* 42* 35*   BUN mg/dL 25 24 22 16 16   CREATININE mg/dL 0 72 0 74 0 97 0 78 0 70   CALCIUM mg/dL 9 3 9 0 9 2 9 5 9 4   ALK PHOS U/L  --  74  --  77 82   ALT U/L  --  19  --  20 22   AST U/L  --  18  --  16 12     Results from last 7 days   Lab Units 03/29/21  0607 03/27/21  0447   MAGNESIUM mg/dL 2 1 2 1     Lab Results   Component Value Date    PHOS 3 2 03/29/2021          No results found for: TROPONINT  ABG:No results found for: PHART, LJZ8DMN, PO2ART, EQI2UZG, W3WXPJLL, BEART, SOURCE    Imaging: I have personally reviewed pertinent reports  EKG: This was personally reviewed by myself     Micro:  Blood Culture: No results found for: BLOODCX  Urine Culture: No results found for: URINECX  Sputum Culture: No components found for: SPUTUMCX  Wound Culure: No results found for: WOUNDCULT    VTE Pharmacologic Prophylaxis: Reason for no pharmacologic prophylaxis on Eliquis  VTE Mechanical Prophylaxis: sequential compression device    Orpajohn Vogt DO  3/30/2021 8:06 AM

## 2021-03-31 ENCOUNTER — ALLIED HEALTH/NURSE VISIT (OUTPATIENT)
Dept: EDUCATION SERVICES | Facility: CLINIC | Age: 66
End: 2021-03-31
Payer: COMMERCIAL

## 2021-03-31 DIAGNOSIS — E11.9 TYPE 2 DIABETES MELLITUS (H): Primary | ICD-10-CM

## 2021-03-31 PROCEDURE — G0108 DIAB MANAGE TRN  PER INDIV: HCPCS

## 2021-03-31 NOTE — PATIENT INSTRUCTIONS
1. Plan to wear the LibrePro sensor for 14 days. It is okay to shower, bathe, and swim (up to 3 feet deep for 30 minutes)    2. Continue with your usual diabetes care plan - check blood sugars and take medicines, as prescribed.    3. Do not cover the sensor with extra adhesive (the small hole in the center of the sensor must remain uncovered)    4. Use a little extra care, especially when getting dressed or exercising, to avoid accidentally loosening or removing the sensor.     5. Remove the sensor if you need to have an MRI or CT scan.     If the LibrePro sensor comes off early, place it in a plastic bag or envelope and call your diabetes educator or bring it with you to your follow-up visit.     Return the sensor to the Endocrine clinic on April 13 at 9 am.    Marcy Wolf RN,CDE  [address] [address]  Phone: [phone] or [phone]  [email or web address]

## 2021-03-31 NOTE — PROGRESS NOTES
DIABETES EDUCATION NOTE:    Loy Limon presents today for education related to type 2 diabetes.   Patient is being treated with:  oral agents, diet, exercise and insulin  He is accompanied by self    PATIENT CONCERNS RELATED TO DIABETES SELF MANAGEMENT: blood sugar better    ASSESSMENT: type 2 diabetes, a1c above target    Current Diabetes Management per Patient:  Taking diabetes medications?   yes:     Diabetes Medication(s)     Biguanides       metFORMIN (GLUCOPHAGE-XR) 500 MG 24 hr tablet    Take 2 tablets (1,000 mg) by mouth 2 times daily (with meals)    Dipeptidyl Peptidase-4 (DPP-4) Inhibitors       sitagliptin (JANUVIA) 25 MG tablet    Take 1 tablet (25 mg) by mouth daily    Diabetic Other       glucose 40 % (400 mg/mL) gel    Take 15-30 g by mouth every 15 minutes as needed for low blood sugar    Insulin       insulin glargine (LANTUS SOLOSTAR) 100 UNIT/ML pen    Inject 10 units subcutaneous daily.        Monitoring  Patient glucose self monitoring as follows: one time daily.   BG meter: One Touch Verio  meter  BG results: see blood glucose log attached to this encounter         BG values are: Not in goal  Patient's most recent   Lab Results   Component Value Date    A1C 10.4 03/24/2021    is not meeting goal of <7.0    Activity: works out at the [facility]    Hypoglycemia:   Patient is at risk of hypoglycemia? Yes      Healthy Coping and Stress Management: family support                   EDUCATION and INSTRUCTION PROVIDED AT THIS VISIT:    Loy brought his meter in today.  He checks a fasting blood sugar every day and some days he has added a second check.  He tells me his blood sugar is better and he is feeling better.    A sensor was placed on his right arm without incident.  This will provide more information on how insulin is working.  We will increase the Lantus by 2 units today.  He is usually high later in the days but he did have a 101 late one afternoon.  He thinks he was more active that  day.    Hypoglycemia protocol reviewed.    SN [id number]  Exp 8/31/21    Patient-stated goal written and given to Loy Limon.  Verbalized and demonstrated understanding of instructions.     PLAN:  See patient instructions  AVS printed and given to patient    FOLLOW-UP:    Time spent with patient at today's visit was 30 minutes.      Any diabetes medication dose changes were made via the CDE Protocol and Collaborative Practice Agreement with [place] [facility].  A copy of this encounter was provided to patient's referring provider.

## 2021-04-07 ENCOUNTER — APPOINTMENT (OUTPATIENT)
Dept: INTERPRETER SERVICES | Facility: CLINIC | Age: 66
End: 2021-04-07
Payer: COMMERCIAL

## 2021-04-10 DIAGNOSIS — Z94.4 LIVER TRANSPLANT RECIPIENT (H): ICD-10-CM

## 2021-04-10 DIAGNOSIS — N40.0 BPH (BENIGN PROSTATIC HYPERPLASIA): Primary | ICD-10-CM

## 2021-04-10 DIAGNOSIS — E11.9 DM TYPE 2, GOAL HBA1C 7%-8% (H): ICD-10-CM

## 2021-04-10 DIAGNOSIS — I48.91 ATRIAL FIBRILLATION WITH RAPID VENTRICULAR RESPONSE (H): ICD-10-CM

## 2021-04-12 ENCOUNTER — TELEPHONE (OUTPATIENT)
Dept: ENDOCRINOLOGY | Facility: CLINIC | Age: 66
End: 2021-04-12

## 2021-04-12 NOTE — TELEPHONE ENCOUNTER
Spoke w/ Pharm D Sergio: confirmed increase in Rx per Dr Tavares.   Maya Brown, RN on 4/12/2021 at 1:02 PM         Sue Tavares MD   3/24/2021  5:34 PM CDT      Letter sent to patient. Increase levothyroxine dose and asked him to schedule follow up in clinic to address elevated A1c.  Sue Tavares MD  Endocrinology and Diabetes    The high TSH means that your thyroid hormone level is low. I would like to increase the levothyroxine dose to 88 mcg per day. I have sent a new prescription to your pharmacy. We will retest the level in 2 months.       [facility]     Phone Message     May a detailed message be left on voicemail: yes      Reason for Call: Medication Question or concern regarding medication   Prescription Clarification  Name of Medication: levothyroxine (SYNTHROID/LEVOTHROID) 88 MCG tablet  Prescribing Provider: Dr. Tavares              Pharmacy: [facility] - [place], [place] - [address]              What on the order needs clarification? Sergio called to clarify dosage.  Per Sergio, dosage was previously 75mcg.  Sergio wanted to make sure the new dosage was accurate.  Please call to clarify.  [phone]

## 2021-04-12 NOTE — TELEPHONE ENCOUNTER
[facility]    Phone Message    May a detailed message be left on voicemail: yes     Reason for Call: Medication Question or concern regarding medication   Prescription Clarification  Name of Medication: levothyroxine (SYNTHROID/LEVOTHROID) 88 MCG tablet  Prescribing Provider: Dr. Tavares   Pharmacy: [facility] - [place], [place] - [address]   What on the order needs clarification? Sergio called to clarify dosage.  Per Sergio, dosage was previously 75mcg.  Sergio wanted to make sure the new dosage was accurate.  Please call to clarify.  [phone]        Action Taken: Message routed to:  [facility] endo    Travel Screening: Not Applicable

## 2021-04-13 ENCOUNTER — ALLIED HEALTH/NURSE VISIT (OUTPATIENT)
Dept: EDUCATION SERVICES | Facility: CLINIC | Age: 66
End: 2021-04-13
Payer: COMMERCIAL

## 2021-04-13 DIAGNOSIS — E11.9 TYPE 2 DIABETES MELLITUS (H): Primary | ICD-10-CM

## 2021-04-13 PROCEDURE — G0108 DIAB MANAGE TRN  PER INDIV: HCPCS

## 2021-04-13 RX ORDER — METFORMIN HCL 500 MG
1000 TABLET, EXTENDED RELEASE 24 HR ORAL 2 TIMES DAILY WITH MEALS
Qty: 360 TABLET | Refills: 1 | Status: SHIPPED | OUTPATIENT
Start: 2021-04-13 | End: 2021-08-31

## 2021-04-13 RX ORDER — METOPROLOL TARTRATE 75 MG/1
75 TABLET, FILM COATED ORAL 2 TIMES DAILY
Qty: 180 TABLET | Refills: 0 | Status: SHIPPED | OUTPATIENT
Start: 2021-04-13 | End: 2021-06-24

## 2021-04-13 RX ORDER — ASPIRIN 325 MG
325 TABLET, DELAYED RELEASE (ENTERIC COATED) ORAL DAILY
Qty: 90 TABLET | Refills: 3 | Status: SHIPPED | OUTPATIENT
Start: 2021-04-13 | End: 2021-09-16

## 2021-04-13 NOTE — PATIENT INSTRUCTIONS
1. Test your blood sugar one to two times per day.  Your blood sugar goals are: Before meals:   2 hrs after a meal: <180    2. Try to eat less carbohydrate.  Cut back on rice and beans and torillas.  Eat 2 tortillas instead of 4.    3. Walk more.  Try to walk for 20 minutes 3-4 times per week.    4. Carry glucose tablets with you when you walk.  They are sold over the counter at pharmacies.  Remember it would take 3-4 of them to treat a low blood sugar. Symptoms of low blood sugar are: feeling shaky, sweaty, feeling weak or dizzy or tired.      1. Mida samaniego nivel de azúcar en natali dyana o dos veces al día. Elena objetivos de azúcar en natali son: Antes de las comidas:  2 horas después de dyana comida: <180    2. Trate de comer menos carbohidratos. Reduzca el arroz, los frijoles y las torillas. Come 2 tortillas en lugar de 4.    3. Camine más. Trate de caminar nancy 20 minutos 3-4 veces por semana.    4. Lleve tabletas de glucosa cuando camine. Se venden sin receta en las farmacias. Recuerde que se necesitarían de 3 a 4 de ellos para tratar un nivel bajo de azúcar en natali. Los síntomas de un nivel bajo de azúcar en natali son: sensación de temblor, sudor, debilidad, mareos o cansancio.    5. Follow up 5/4 at 9 am    Marcy Wolf RN, [id number] [place] [address]  Phone: [phone] or [phone]  Fax: [phone]    To ask a question to your Endocrine care team, please send them a [employer] message, or reach them by phone at [phone]      To expedite your medication refill(s), please contact your pharmacy and have them   fax a refill request to: [phone].  *Please allow 3 business days for routine medication refills.  *Please allow 5 business days for controlled substance medication refills.     For after-hours urgent Endocrine issues, that do not require 911, please dial [phone], and ask to speak with the Endocrinologist On-Call

## 2021-04-13 NOTE — PROGRESS NOTES
DIABETES EDUCATION NOTE:    Loy Limon presents today for education related to Type 2 diabetes.    Patient is being treated with:  oral agents, exercise, insulin and SMBG  He is accompanied by self    PATIENT CONCERNS RELATED TO DIABETES SELF MANAGEMENT:   Blood sugar high    ASSESSMENT: type 2 diabetes, a1c above target    Current Diabetes Management per Patient:  Taking diabetes medications?   yes:     Diabetes Medication(s)     Biguanides       metFORMIN (GLUCOPHAGE-XR) 500 MG 24 hr tablet    Take 2 tablets (1,000 mg) by mouth 2 times daily (with meals)    Dipeptidyl Peptidase-4 (DPP-4) Inhibitors       sitagliptin (JANUVIA) 25 MG tablet    Take 1 tablet (25 mg) by mouth daily    Diabetic Other       glucose 40 % (400 mg/mL) gel    Take 15-30 g by mouth every 15 minutes as needed for low blood sugar    Insulin       insulin glargine (LANTUS SOLOSTAR) 100 UNIT/ML pen    Inject 10 units subcutaneous daily.        Monitoring  Patient glucose self monitoring as follows: daily   BG results:         BG values are: not in goal  Patient's most recent   Lab Results   Component Value Date    A1C 10.4 03/24/2021    is not meeting goal of <7.0    Activity: sedentary right now, has gone to the [employer] in the past, he states he will start walking outside    Nutrition:    Breakfast: (8-9a) coffee, 4 small cookies  Snack:  Lunch:(11-12p) lentil soup or chicken with tomato and onion and garlic  Snack:   Dinner: (5:30) steak, veggies  Snack: apples, watermelon, cantaloupe    Drinks: coffee, milk, water    Hypoglycemia:   Patient is at risk of hypoglycemia? Yes                       EDUCATION and INSTRUCTION PROVIDED AT THIS VISIT:    His blood sugar remains high despite insulin.  He would like to try to increase activity and cut back on carb before adding medication.     1.He will cut his tortilla intake from 4 per meal to 2 per meal.     2.  He will walk 20 minutes 3-4 times per week.      Will have him work on this and  return for another sensor before his follow up with Gilma Guthrie 5/13 so she has data for that appt.     Patient-stated goal written and given to Loy Limon.  Verbalized and demonstrated understanding of instructions.     PLAN:  See patient instructions  AVS printed and given to patient    FOLLOW-UP:    Time spent with patient at today's visit was 30 minutes.      Any diabetes medication dose changes were made via the CDE Protocol and Collaborative Practice Agreement with [place] and  [place].  A copy of this encounter was provided to patient's referring provider.

## 2021-04-13 NOTE — CONFIDENTIAL NOTE
alfuzosin ER (UROXATRAL) 10 MG 24 hr tablet  Last Written Prescription Date:  unknown  Last Fill Quantity: unknown,   # refills: unknown  Last Office Visit : 3/10/21  Future Office visit:  6/9/21      Metoprolol Tartrate 75 MG TABS  Last Written Prescription Date:  2/9/21  Last Fill Quantity:  180  # refills: 0    Medium med alert    Warnings Override History for Metoprolol Tartrate 75 MG TABS [[id number]]    Overridden by Belkys Rodriguez RN on Feb 9, 2021 7:34 AM   Drug-Drug   1. QUINAZOLINES / BETA-ADRENERGIC BLOCKERS [Level: Moderate] [Reason: Tolerated medication/side effects in past]   Other Orders: alfuzosin ER (UROXATRAL) 10 MG 24 hr tablet        aspirin (ASA) 325 MG EC tablet    metFORMIN (GLUCOPHAGE-XR) 500 MG 24 hr tablet    Routing refill request to provider for review/approval because: alfuzosin historical. bp > 140/90

## 2021-04-16 ENCOUNTER — VIRTUAL VISIT (OUTPATIENT)
Dept: NEPHROLOGY | Facility: CLINIC | Age: 66
End: 2021-04-16
Attending: STUDENT IN AN ORGANIZED HEALTH CARE EDUCATION/TRAINING PROGRAM
Payer: COMMERCIAL

## 2021-04-16 DIAGNOSIS — R80.1 PERSISTENT PROTEINURIA: ICD-10-CM

## 2021-04-16 DIAGNOSIS — I10 ESSENTIAL HYPERTENSION: ICD-10-CM

## 2021-04-16 DIAGNOSIS — Z94.4 LIVER TRANSPLANT RECIPIENT (H): Chronic | ICD-10-CM

## 2021-04-16 DIAGNOSIS — Z94.4 LIVER TRANSPLANT STATUS (H): ICD-10-CM

## 2021-04-16 DIAGNOSIS — E09.9 DRUG-INDUCED DIABETES MELLITUS (H): Primary | ICD-10-CM

## 2021-04-16 PROCEDURE — 99214 OFFICE O/P EST MOD 30 MIN: CPT | Mod: 95 | Performed by: STUDENT IN AN ORGANIZED HEALTH CARE EDUCATION/TRAINING PROGRAM

## 2021-04-16 NOTE — PROGRESS NOTES
"Nephrology Clinic    Telephone Visit     SUBJECTIVE:   Loy Limon is a 65 year old year old male with pmh of EtOH cirrhosis and HCC s/p liver transplant 2018, prostate cancer, DM2 diagnosed post-transplant, and proteinuria here for: follow-up of his proteinuria    The patient returns after 5 months. His creatinine remains <1 mg/dl, but his UPCR bassam to 2.7 g/g from 0.7 g/g. He states he \"is taking all of his meds,\" which includes losartan 50mg daily.     Recent tacro levels are mostly in the 7s. Recent A1c's are 12.1% then 10.4%, and the patient states he recently started insulin. His med list shows a recent start of sitagliptin (Januvia) as well.     LE swelling in bilat feet that greatly improved with reducing sodium in diet (on advice from Hepatology, according to the patient). No SOB. No CP. No abd pain but he notes abd swelling that he relates to his liver transplant. No N/V/D.     on the call.     Review of systems:  4 point ROS negative except as noted above.    Past Medical History:   Diagnosis Date     Anemia      Biliary stricture of transplanted liver (H) 2018     BPH (benign prostatic hyperplasia)      Cancer (H)     hepatocellualr carcinoma     Cirrhosis of liver (H) 2018     Diabetes (H)      Enlarged prostate      Hypothyroidism      Impotence of organic origin 2020     Inguinal hernia     Repaired with mesh on 18     Liver lesion 2018     Liver transplanted (H) 2018     donor liver transplant     Portal vein thrombosis     on path explant     Postoperative atrial fibrillation (H) 2018     Prostate cancer (H)      TB lung, latent     Treated         alfuzosin ER (UROXATRAL) 10 MG 24 hr tablet,   amLODIPine (NORVASC) 10 MG tablet, Take 1 tablet (10 mg) by mouth daily  aspirin (ASA) 325 MG EC tablet, Take 1 tablet (325 mg) by mouth daily  bisacodyl (DULCOLAX) 10 MG suppository, Place 1 suppository (10 mg) rectally daily For " constipation. Stop if diarrhea occurs.  blood glucose (NO BRAND SPECIFIED) lancets standard, Use to test blood sugar 4 times daily or as directed.  blood glucose (ONETOUCH VERIO IQ) test strip, Use to test blood sugar 2 times daily or as directed.  blood glucose monitoring (NO BRAND SPECIFIED) meter device kit, Use to test blood sugar 2 times daily or as directed. Please let the pt know when it is ready to .  glucose 40 % (400 mg/mL) gel, Take 15-30 g by mouth every 15 minutes as needed for low blood sugar  insulin glargine (LANTUS SOLOSTAR) 100 UNIT/ML pen, Inject 10 units subcutaneous daily.  insulin pen needle (BD KIRK U/F) 32G X 4 MM miscellaneous, Use 1 daily.  Lancets (ONETOUCH DELICA PLUS [id number]) MISC, U TO TEST QID OR UTD  levothyroxine (SYNTHROID/LEVOTHROID) 88 MCG tablet, Take 1 tablet (88 mcg) by mouth daily  losartan (COZAAR) 50 MG tablet, Take 1 tablet (50 mg) by mouth daily  magnesium hydroxide (MILK OF MAGNESIA) 400 MG/5ML suspension, Take 30 mLs by mouth daily as needed for constipation or heartburn (Stop if diarrhea occurs)  metFORMIN (GLUCOPHAGE-XR) 500 MG 24 hr tablet, Take 2 tablets (1,000 mg) by mouth 2 times daily (with meals)  Metoprolol Tartrate 75 MG TABS, Take 75 mg by mouth 2 times daily  mineral oil liquid, Take 30 mLs by mouth daily For constipation. Stop if diarrhea occurs.  Multiple Vitamin (DAILY-STEVE) TABS, Take 1 tablet by mouth daily  polyethylene glycol (MIRALAX) 17 GM/Dose powder, Take 17 g (1 capful) by mouth daily  polyethylene glycol (MIRALAX) packet, Take 17 g by mouth daily (Patient not taking: Reported on 3/24/2021)  polyethylene glycol (MIRALAX) powder, MIX 17 GRAMS AND DRINK D  Sharps Container MISC, 1 each daily  sildenafil (REVATIO) 20 MG tablet, Take 3-5 tablets ( mg) by mouth daily as needed (1/2 hour prior to sexual activity)  sildenafil (VIAGRA) 100 MG tablet, Take 1/2 to 1 full pill by mouth 1/2 hour before sexual activity  sitagliptin (JANUVIA) 25  MG tablet, Take 1 tablet (25 mg) by mouth daily  tacrolimus (GENERIC EQUIVALENT) 1 MG capsule, Take 2 capsules (2 mg) by mouth every 12 hours  ursodiol (ACTIGALL) 250 MG tablet, Take 1 tablet (250 mg) by mouth 2 times daily    No current facility-administered medications on file prior to visit.        OBJECTIVE:  Vital signs and Physical exam deferred due to phone visit.       SPEP, serum immunofixation, and serum free light chain results reviewed. UPCR, BMP, CBC reviewed.     Recent Labs   Lab Test 03/24/21  0812 02/01/21  0723    136   POTASSIUM 4.5 4.5   CHLORIDE 106 104   CO2 26 28   ANIONGAP 7 5   * 296*   BUN 24 23   CR 0.86 0.75   YELENA 8.9 8.9       Lab Results   Component Value Date    WBC 6.1 03/24/2021     Lab Results   Component Value Date    RBC 5.18 03/24/2021     Lab Results   Component Value Date    HGB 14.7 03/24/2021     Lab Results   Component Value Date    HCT 44.0 03/24/2021     No components found for: MCT  Lab Results   Component Value Date    MCV 85 03/24/2021     Lab Results   Component Value Date    MCH 28.4 03/24/2021     Lab Results   Component Value Date    MCHC 33.4 03/24/2021     Lab Results   Component Value Date    RDW 12.4 03/24/2021     Lab Results   Component Value Date     03/24/2021       Color Urine (no units)   Date Value   09/25/2020 Yellow     Appearance Urine (no units)   Date Value   09/25/2020 Clear     Glucose Urine (mg/dL)   Date Value   09/25/2020 >499 (A)     Bilirubin Urine (no units)   Date Value   09/25/2020 Negative     Ketones Urine (mg/dL)   Date Value   09/25/2020 Negative     Specific Gravity Urine (no units)   Date Value   09/25/2020 1.017     pH Urine (pH)   Date Value   09/25/2020 5.0     Protein Albumin Urine (mg/dL)   Date Value   09/25/2020 100 (A)     Nitrite Urine (no units)   Date Value   09/25/2020 Negative     Leukocyte Esterase Urine (no units)   Date Value   09/25/2020 Negative           ASSESSMENT and PLAN:   Diagnoses and all  orders for this visit:    Persistent proteinuria  Preserved GFR   Creatinine remains <1 mg/dl, though based on last check the patient's thus far unexplained proteinuria has appreciably worsened. Will recheck to ensure this is a persistent/steady-state issue. If it is, it may mean that a kidney biopsy is warranted given it is unclear what the cause is: both his CNI-use and post-transplant DM2 are too recent onset to be capable, typically, of causing FSGS or diabetic nephropathy. Atypical presentations of these or other causes of proteinuria (which may be trending towards nephrotic range, repeat tests pending) without hematuria would certainly affect management. Will plan to discuss with patient's hepatologist given tacrolimus-toxicity is on the DDX.    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 Nonetheless, this too remains on DDX for which a biopsy may be pursued.    The idea of a kidney biopsy was introduced to the patient today, and he appeared agreeable given it wasn't a surgery.   -     Protein  random urine with Creat Ratio; Future  -     Routine UA with microscopic (for [facility]); Future  -     Renal panel; Future  -     INR; Future    Drug-induced diabetes mellitus (H)  Poorly controlled. GLP1 (vs SGLT2i) is an appropriate add-on.     Essential hypertension  Uncontrolled, but did not want to hemodynamically decrease the patient's proteinuria with an increase in his ARB (losartan 50mg daily) while assessing the level to determine next workup steps given that BPs are not urgently elevated. Will thus plan to increase losartan to 100mg daily upon next follow-up if not before (potentially via his Tx coordinator). No change to amlodipine 10mg daily or metoprolol 75mg bid.     Liver transplant recipient (H)  Liver transplant status (H)   12/2018 Tx. On tacro only at this  time.  -     INR; Future    Electrolytes, acid-base  No issues on last labs.       Return to clinic in 2 months.     Ryan Eaton MD  Instructor  Nephrology  Pager: [phone]

## 2021-04-16 NOTE — LETTER
"4/16/2021       RE: Loy Limon  [address]  [place] [address]     Dear Colleague,    Thank you for referring your patient, Loy Limon, to the [facility] [place] [facility]. Please see a copy of my visit note below.    Loy is a 65 year old who is being evaluated via a billable telephone visit.      What phone number would you like to be contacted at? Please call  services first at [phone]  How would you like to obtain your AVS? Mail a copy  Phone call duration: 38 minutes (11:02 to 11:40am)    Dean Eaton MD  Renal       Nephrology Clinic    Telephone Visit     SUBJECTIVE:   Loy Limon is a 65 year old year old male with pmh of EtOH cirrhosis and HCC s/p liver transplant 12/22/2018, prostate cancer, DM2 diagnosed post-transplant, and proteinuria here for: follow-up of his proteinuria    The patient returns after 5 months. His creatinine remains <1 mg/dl, but his UPCR bassam to 2.7 g/g from 0.7 g/g. He states he \"is taking all of his meds,\" which includes losartan 50mg daily.     Recent tacro levels are mostly in the 7s. Recent A1c's are 12.1% then 10.4%, and the patient states he recently started insulin. His med list shows a recent start of sitagliptin (Januvia) as well.     LE swelling in bilat feet that greatly improved with reducing sodium in diet (on advice from Hepatology, according to the patient). No SOB. No CP. No abd pain but he notes abd swelling that he relates to his liver transplant. No N/V/D.     on the call.     Review of systems:  4 point ROS negative except as noted above.    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/28/2018     BPH (benign prostatic hyperplasia)      Cancer (H)     hepatocellualr carcinoma     Cirrhosis of liver (H) 05/08/2018     Diabetes (H)      Enlarged prostate  "     Hypothyroidism      Impotence of organic origin 2020     Inguinal hernia     Repaired with mesh on 18     Liver lesion 2018     Liver transplanted (H) 2018     donor liver transplant     Portal vein thrombosis     on path explant     Postoperative atrial fibrillation (H) 2018     Prostate cancer (H)      TB lung, latent     Treated         alfuzosin ER (UROXATRAL) 10 MG 24 hr tablet,   amLODIPine (NORVASC) 10 MG tablet, Take 1 tablet (10 mg) by mouth daily  aspirin (ASA) 325 MG EC tablet, Take 1 tablet (325 mg) by mouth daily  bisacodyl (DULCOLAX) 10 MG suppository, Place 1 suppository (10 mg) rectally daily For constipation. Stop if diarrhea occurs.  blood glucose (NO BRAND SPECIFIED) lancets standard, Use to test blood sugar 4 times daily or as directed.  blood glucose (ONETOUCH VERIO IQ) test strip, Use to test blood sugar 2 times daily or as directed.  blood glucose monitoring (NO BRAND SPECIFIED) meter device kit, Use to test blood sugar 2 times daily or as directed. Please let the pt know when it is ready to .  glucose 40 % (400 mg/mL) gel, Take 15-30 g by mouth every 15 minutes as needed for low blood sugar  insulin glargine (LANTUS SOLOSTAR) 100 UNIT/ML pen, Inject 10 units subcutaneous daily.  insulin pen needle (BD KIRK U/F) 32G X 4 MM miscellaneous, Use 1 daily.  Lancets (ONETOUCH DELICA PLUS [id number]) MISC, U TO TEST QID OR UTD  levothyroxine (SYNTHROID/LEVOTHROID) 88 MCG tablet, Take 1 tablet (88 mcg) by mouth daily  losartan (COZAAR) 50 MG tablet, Take 1 tablet (50 mg) by mouth daily  magnesium hydroxide (MILK OF MAGNESIA) 400 MG/5ML suspension, Take 30 mLs by mouth daily as needed for constipation or heartburn (Stop if diarrhea occurs)  metFORMIN (GLUCOPHAGE-XR) 500 MG 24 hr tablet, Take 2 tablets (1,000 mg) by mouth 2 times daily (with meals)  Metoprolol Tartrate 75 MG TABS, Take 75 mg by mouth 2 times daily  mineral oil liquid, Take 30 mLs by mouth  daily For constipation. Stop if diarrhea occurs.  Multiple Vitamin (DAILY-STEVE) TABS, Take 1 tablet by mouth daily  polyethylene glycol (MIRALAX) 17 GM/Dose powder, Take 17 g (1 capful) by mouth daily  polyethylene glycol (MIRALAX) packet, Take 17 g by mouth daily (Patient not taking: Reported on 3/24/2021)  polyethylene glycol (MIRALAX) powder, MIX 17 GRAMS AND DRINK D  Sharps Container MISC, 1 each daily  sildenafil (REVATIO) 20 MG tablet, Take 3-5 tablets ( mg) by mouth daily as needed (1/2 hour prior to sexual activity)  sildenafil (VIAGRA) 100 MG tablet, Take 1/2 to 1 full pill by mouth 1/2 hour before sexual activity  sitagliptin (JANUVIA) 25 MG tablet, Take 1 tablet (25 mg) by mouth daily  tacrolimus (GENERIC EQUIVALENT) 1 MG capsule, Take 2 capsules (2 mg) by mouth every 12 hours  ursodiol (ACTIGALL) 250 MG tablet, Take 1 tablet (250 mg) by mouth 2 times daily    No current facility-administered medications on file prior to visit.        OBJECTIVE:  Vital signs and Physical exam deferred due to phone visit.       SPEP, serum immunofixation, and serum free light chain results reviewed. UPCR, BMP, CBC reviewed.     Recent Labs   Lab Test 03/24/21  0812 02/01/21  0723    136   POTASSIUM 4.5 4.5   CHLORIDE 106 104   CO2 26 28   ANIONGAP 7 5   * 296*   BUN 24 23   CR 0.86 0.75   YELENA 8.9 8.9       Lab Results   Component Value Date    WBC 6.1 03/24/2021     Lab Results   Component Value Date    RBC 5.18 03/24/2021     Lab Results   Component Value Date    HGB 14.7 03/24/2021     Lab Results   Component Value Date    HCT 44.0 03/24/2021     No components found for: MCT  Lab Results   Component Value Date    MCV 85 03/24/2021     Lab Results   Component Value Date    MCH 28.4 03/24/2021     Lab Results   Component Value Date    MCHC 33.4 03/24/2021     Lab Results   Component Value Date    RDW 12.4 03/24/2021     Lab Results   Component Value Date     03/24/2021       Color Urine (no units)    Date Value   09/25/2020 Yellow     Appearance Urine (no units)   Date Value   09/25/2020 Clear     Glucose Urine (mg/dL)   Date Value   09/25/2020 >499 (A)     Bilirubin Urine (no units)   Date Value   09/25/2020 Negative     Ketones Urine (mg/dL)   Date Value   09/25/2020 Negative     Specific Gravity Urine (no units)   Date Value   09/25/2020 1.017     pH Urine (pH)   Date Value   09/25/2020 5.0     Protein Albumin Urine (mg/dL)   Date Value   09/25/2020 100 (A)     Nitrite Urine (no units)   Date Value   09/25/2020 Negative     Leukocyte Esterase Urine (no units)   Date Value   09/25/2020 Negative           ASSESSMENT and PLAN:   Diagnoses and all orders for this visit:    Persistent proteinuria  Preserved GFR   Creatinine remains <1 mg/dl, though based on last check the patient's thus far unexplained proteinuria has appreciably worsened. Will recheck to ensure this is a persistent/steady-state issue. If it is, it may mean that a kidney biopsy is warranted given it is unclear what the cause is: both his CNI-use and post-transplant DM2 are too recent onset to be capable, typically, of causing FSGS or diabetic nephropathy. Atypical presentations of these or other causes of proteinuria (which may be trending towards nephrotic range, repeat tests pending) without hematuria would certainly affect management. Will plan to discuss with patient's hepatologist given tacrolimus-toxicity is on the DDX.    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 Nonetheless, this too remains on DDX for which a biopsy may be pursued.    The idea of a kidney biopsy was introduced to the patient today, and he appeared agreeable given it wasn't a surgery.   -     Protein  random urine with Creat Ratio; Future  -     Routine UA with microscopic (for [facility]); Future  -     Renal panel; Future  -      INR; Future    Drug-induced diabetes mellitus (H)  Poorly controlled. GLP1 (vs SGLT2i) is an appropriate add-on.     Essential hypertension  Uncontrolled, but did not want to hemodynamically decrease the patient's proteinuria with an increase in his ARB (losartan 50mg daily) while assessing the level to determine next workup steps given that BPs are not urgently elevated. Will thus plan to increase losartan to 100mg daily upon next follow-up if not before (potentially via his Tx coordinator). No change to amlodipine 10mg daily or metoprolol 75mg bid.     Liver transplant recipient (H)  Liver transplant status (H)   12/2018 Tx. On tacro only at this time.  -     INR; Future    Electrolytes, acid-base  No issues on last labs.       Return to clinic in 2 months.     Ryan Eaton MD  Instructor  Nephrology  Pager: [phone]        Again, thank you for allowing me to participate in the care of your patient.      Sincerely,    Ryan Eaton MD

## 2021-04-16 NOTE — PROGRESS NOTES
Loy is a 65 year old who is being evaluated via a billable telephone visit.      What phone number would you like to be contacted at? Please call  services first at [phone]  How would you like to obtain your AVS? Mail a copy  Phone call duration: 38 minutes (11:02 to 11:40am)    Dean Eaton MD  Renal

## 2021-04-19 ENCOUNTER — PRE VISIT (OUTPATIENT)
Dept: UROLOGY | Facility: CLINIC | Age: 66
End: 2021-04-19

## 2021-04-19 RX ORDER — ALFUZOSIN HYDROCHLORIDE 10 MG/1
10 TABLET, EXTENDED RELEASE ORAL DAILY
Qty: 90 TABLET | Refills: 3 | OUTPATIENT
Start: 2021-04-19

## 2021-04-19 NOTE — TELEPHONE ENCOUNTER
Chief Complaint : Follow up - 4 Months    Hx/Sx: Nocturia, prostate cancer (s/p RALP 1/3/20)    Records/Orders: PSA completed 3/24/21    Pt Contacted: N/a    At Rooming: IHSAN Almanzar/CONCEPCIÓN Tavares, EMT

## 2021-04-20 ENCOUNTER — APPOINTMENT (OUTPATIENT)
Dept: INTERPRETER SERVICES | Facility: CLINIC | Age: 66
End: 2021-04-20
Payer: COMMERCIAL

## 2021-04-20 LAB
CELL TYPE ALLO: NORMAL
CHANNELSHIFTALLOB1: -63
CHANNELSHIFTALLOT1: -28
COMMENT ALLOB1: NORMAL
CROSSMATCHDATEALLO: NORMAL
DONOR ALLO: NORMAL
DONORCELLDATE ALLO: NORMAL
POS CUT OFF ALLO B: >122
POS CUT OFF ALLO T: >67
RESULT ALLO B1: NORMAL
RESULT ALLO T1: NORMAL
SERUM DATE ALLO B1: NORMAL
SERUM DATE ALLO T1: NORMAL
TESTMETHODALLO: NORMAL
TREATMENT ALLO B1: NORMAL
TREATMENT ALLO T1: NORMAL

## 2021-04-23 DIAGNOSIS — R80.1 PERSISTENT PROTEINURIA: ICD-10-CM

## 2021-04-23 DIAGNOSIS — C61 PROSTATE CANCER (H): ICD-10-CM

## 2021-04-23 DIAGNOSIS — Z94.4 LIVER TRANSPLANT STATUS (H): ICD-10-CM

## 2021-04-23 LAB
ALBUMIN SERPL-MCNC: 3.3 G/DL (ref 3.4–5)
ALBUMIN UR-MCNC: >499 MG/DL
ANION GAP SERPL CALCULATED.3IONS-SCNC: 4 MMOL/L (ref 3–14)
APPEARANCE UR: CLEAR
BILIRUB UR QL STRIP: NEGATIVE
BUN SERPL-MCNC: 16 MG/DL (ref 7–30)
CALCIUM SERPL-MCNC: 8.3 MG/DL (ref 8.5–10.1)
CHLORIDE SERPL-SCNC: 108 MMOL/L (ref 94–109)
CO2 SERPL-SCNC: 26 MMOL/L (ref 20–32)
COLOR UR AUTO: YELLOW
CREAT SERPL-MCNC: 0.84 MG/DL (ref 0.66–1.25)
CREAT UR-MCNC: 80 MG/DL
GFR SERPL CREATININE-BSD FRML MDRD: >90 ML/MIN/{1.73_M2}
GLUCOSE SERPL-MCNC: 200 MG/DL (ref 70–99)
GLUCOSE UR STRIP-MCNC: 50 MG/DL
HGB UR QL STRIP: NEGATIVE
INR PPP: 1.02 (ref 0.86–1.14)
KETONES UR STRIP-MCNC: NEGATIVE MG/DL
LEUKOCYTE ESTERASE UR QL STRIP: NEGATIVE
NITRATE UR QL: NEGATIVE
PH UR STRIP: 6 PH (ref 5–7)
PHOSPHATE SERPL-MCNC: 2.6 MG/DL (ref 2.5–4.5)
POTASSIUM SERPL-SCNC: 4.9 MMOL/L (ref 3.4–5.3)
PROT UR-MCNC: 2.32 G/L
PROT/CREAT 24H UR: 2.88 G/G CR (ref 0–0.2)
PSA SERPL-MCNC: <0.01 UG/L (ref 0–4)
RBC #/AREA URNS AUTO: 7 /HPF (ref 0–2)
SODIUM SERPL-SCNC: 138 MMOL/L (ref 133–144)
SOURCE: ABNORMAL
SP GR UR STRIP: 1.01 (ref 1–1.03)
UROBILINOGEN UR STRIP-MCNC: 0 MG/DL (ref 0–2)
WBC #/AREA URNS AUTO: 1 /HPF (ref 0–5)

## 2021-04-23 PROCEDURE — 80069 RENAL FUNCTION PANEL: CPT | Performed by: PATHOLOGY

## 2021-04-23 PROCEDURE — 36415 COLL VENOUS BLD VENIPUNCTURE: CPT | Performed by: PATHOLOGY

## 2021-04-23 PROCEDURE — 84153 ASSAY OF PSA TOTAL: CPT | Performed by: PATHOLOGY

## 2021-04-23 PROCEDURE — 85610 PROTHROMBIN TIME: CPT | Performed by: PATHOLOGY

## 2021-04-23 PROCEDURE — 84156 ASSAY OF PROTEIN URINE: CPT | Performed by: PATHOLOGY

## 2021-04-23 PROCEDURE — 81001 URINALYSIS AUTO W/SCOPE: CPT | Performed by: PATHOLOGY

## 2021-04-27 ENCOUNTER — VIRTUAL VISIT (OUTPATIENT)
Dept: UROLOGY | Facility: CLINIC | Age: 66
End: 2021-04-27
Payer: COMMERCIAL

## 2021-04-27 DIAGNOSIS — C61 PROSTATE CANCER (H): Primary | ICD-10-CM

## 2021-04-27 PROCEDURE — 99214 OFFICE O/P EST MOD 30 MIN: CPT | Mod: 95 | Performed by: PHYSICIAN ASSISTANT

## 2021-04-27 NOTE — PROGRESS NOTES
"HPI: Mr. Loy Limon is a 65 year old year old male presenting today for evaluation of chief complaint(s): No chief complaint on file.    Mr. Limon has PMH significant for HTN, DM, and immunosuppression 2/2 liver transplant.  More recently he also developed prostate cancer (4/26/19 - PSA 5.51ng/dL) and is now s/p RALP with Dr. Casiano on 1/3/20, final path: [place] 3+4=7, neg margins, [id number].  Decipher was obtained and he is in HIGH risk group (0.70).     Today, he is accompanied by a  on the phone  4/23/21 - PSA <0.01ng/dL  3/24/21 - HGB A1C 10.4%  2/1/21 - [place] PVR 47cc.      He was last \"seen\" in Urology on 12/29/20 for a telephone visit.  At that time his PSA was undetectable.    At that time he was leaking with coughing, sitting on a chair.  He was wearing 2-3ppd depending on activity level.  He found PFPT frustrating and ineffective.  He was also concerned about nocturia every 2 hours.  He felt he was emptying properly.  We discussed his blood sugars - he hadn't been checking them because his glucometer was broken.  I sent him for a PVR check which showed 47cc on 2/1/21.      Today he returns for a virtual visit.  His PSA remains undetectable (4/23/21 <0.01ng/dL).   Urinary symptom: Has to force himself a little bit to urinate.  Feels he DOES empty his bladder all the way most of the time.  Nocturia every hour but he thinks this is because of the diabetes.  He is struggling to get sugars down from 190-200mg/dL despite everything he tries.  There is a nurse, Brandee Wolf, who is helping him with this.  He has an appointment on 5/4/21 with her.  He is followed by Dr. Eaton from Nephrology who notes worsening proteinuria and recommends a kidney biopsy.  His last UA on 4/23/21 shows 7 RBCs, protein >499mg/dL and glucose 50mg/dL.  His previous UA on 9/25/20 showed no blood.  He has never seen gross hematuria but has a thick foam that does not dissolve (the foam forms when the " urine falls into the toilet) .      Current Outpatient Medications   Medication Sig Dispense Refill     alfuzosin ER (UROXATRAL) 10 MG 24 hr tablet        amLODIPine (NORVASC) 10 MG tablet Take 1 tablet (10 mg) by mouth daily 90 tablet 3     aspirin (ASA) 325 MG EC tablet Take 1 tablet (325 mg) by mouth daily 90 tablet 3     bisacodyl (DULCOLAX) 10 MG suppository Place 1 suppository (10 mg) rectally daily For constipation. Stop if diarrhea occurs. 10 suppository 1     blood glucose (NO BRAND SPECIFIED) lancets standard Use to test blood sugar 4 times daily or as directed. 400 each 2     blood glucose (ONETOUCH VERIO IQ) test strip Use to test blood sugar 2 times daily or as directed. 200 strip 11     blood glucose monitoring (NO BRAND SPECIFIED) meter device kit Use to test blood sugar 2 times daily or as directed. Please let the pt know when it is ready to . 1 kit 0     glucose 40 % (400 mg/mL) gel Take 15-30 g by mouth every 15 minutes as needed for low blood sugar 30 g 3     insulin glargine (LANTUS SOLOSTAR) 100 UNIT/ML pen Inject 10 units subcutaneous daily. 15 mL 1     insulin pen needle (BD KIRK U/F) 32G X 4 MM miscellaneous Use 1 daily. 200 each 3     Lancets (ONETOUCH DELICA PLUS [id number]) MISC U TO TEST QID OR UTD       levothyroxine (SYNTHROID/LEVOTHROID) 88 MCG tablet Take 1 tablet (88 mcg) by mouth daily 90 tablet 3     losartan (COZAAR) 50 MG tablet Take 1 tablet (50 mg) by mouth daily 30 tablet 11     magnesium hydroxide (MILK OF MAGNESIA) 400 MG/5ML suspension Take 30 mLs by mouth daily as needed for constipation or heartburn (Stop if diarrhea occurs) 355 mL 1     metFORMIN (GLUCOPHAGE-XR) 500 MG 24 hr tablet Take 2 tablets (1,000 mg) by mouth 2 times daily (with meals) 360 tablet 1     Metoprolol Tartrate 75 MG TABS Take 75 mg by mouth 2 times daily 180 tablet 0     mineral oil liquid Take 30 mLs by mouth daily For constipation. Stop if diarrhea occurs. 500 mL 1     Multiple Vitamin  (DAILY-STEVE) TABS Take 1 tablet by mouth daily 100 tablet 3     polyethylene glycol (MIRALAX) 17 GM/Dose powder Take 17 g (1 capful) by mouth daily 507 g 3     polyethylene glycol (MIRALAX) packet Take 17 g by mouth daily (Patient not taking: Reported on 3/24/2021) 30 packet 3     polyethylene glycol (MIRALAX) powder MIX 17 GRAMS AND DRINK D       Sharps Container MISC 1 each daily 1 each 2     sildenafil (REVATIO) 20 MG tablet Take 3-5 tablets ( mg) by mouth daily as needed (1/2 hour prior to sexual activity) 60 tablet 3     sildenafil (VIAGRA) 100 MG tablet Take 1/2 to 1 full pill by mouth 1/2 hour before sexual activity 10 tablet 3     sitagliptin (JANUVIA) 25 MG tablet Take 1 tablet (25 mg) by mouth daily 90 tablet 1     tacrolimus (GENERIC EQUIVALENT) 1 MG capsule Take 2 capsules (2 mg) by mouth every 12 hours 120 capsule 11     ursodiol (ACTIGALL) 250 MG tablet Take 1 tablet (250 mg) by mouth 2 times daily 180 tablet 3       ALLERGIES: Patient has no known allergies.      REVIEW OF SYSTEMS:  As above in HPI    GENERAL PHYSICAL EXAM:   Vitals: There were no vitals taken for this visit.  There is no height or weight on file to calculate BMI.    NEURO: Alert and oriented x 3.  PSYCH: Normal mood and affect, pleasant and agreeable during interview    RADIOLOGY: The following tests were reviewed:   MRI PROSTATE: 4/26/2019 2:37 PM     CLINICAL HISTORY: Inflammatory diseases of prostate; Elevated prostate  specific antigen (PSA)     Most Recent PSA: 5.5 ug/L     Comparison: None available.     TECHNIQUE:  The following sequences were obtained: High-resolution axial  T2-weighted, coronal T2-weighted, 3D volumetric T2-weighted, axial  pre-contrast T1, axial diffusion-weighted, axial apparent diffusion  coefficient and axial dynamic contrast-enhanced T1. Postcontrast  images were evaluated on a separate workstation to evaluate dynamic  contrast enhancement. The technique of this exam is PI-RADS v2.1  compliant.  Contrast dose: 7.5mL Gadavist     FINDINGS:  Size: 45 grams  Hemorrhage: Mild  Peripheral zone: Heterogeneous on T2-weighted images. Suspicious  lesions as detailed below.  Transition zone: Enlarged with BPH changes. No suspicious lesions  identified.     Lesion(s) in rank order of severity (highest score- to lowest score,  then by size)      Lesion 1:  Location: Left apex peripheral zone at the 4-7 o'clock position  relative to the urethra. Series 6 image 69.  Size: 20 x 11 mm  T2 description: Lenticular, homogeneous, moderately T2 hypointense  lesion  T2 numerical assessment: 5  DWI description: Marked increased signal on diffusion-weighted images  (series 10 image 22) and decreased signal on ADC map (series 8 image  22)  DWI numerical assessment: 5  DCE assessment: Positive    Prostate margin: Capsular abutment>15 mm with capsular irregularity  and obliteration of rectoprostatic angle   Lesion overall PI-RADS category: 5     Neurovascular bundles: Not well seen.  Seminal vesicles: Not involved by tumor.  Lymph nodes: No adenopathy.  Bones: No suspicious lesions.  Other pelvic organs: Layering debris in the bladder lumen.                                                                   IMPRESSION:  1. Based on the most suspicious abnormality, this exam is  characterized as PIRADS 5 - Clinically significant cancer is highly  likely to be present.?The most suspicious abnormality is located at  the posterior left apex peripheral zone and there is high suspicion of  extraprostatic extension.  2. No suspicious adenopathy or evidence of pelvic metastases.    LABS: The last test results for Mr. Loy Limon were reviewed:  PSA -   Lab Results   Component Value Date    PSA <0.01 04/23/2021    PSA <0.01 03/24/2021    PSA <0.01 02/01/2021    PSA <0.01 09/25/2020    PSA <0.01 07/03/2020    PSA <0.01 03/30/2020    PSA 5.51 04/26/2019    PSA 5.02 03/19/2019    PSA 5.25 03/04/2019    PSA 3.27 08/15/2018     BMP -   Recent  Labs   Lab Test 04/23/21  0755 03/24/21  0812 02/01/21  0723 09/25/20  0820 12/18/19  0753 12/18/19  0753 11/18/19  0810 11/18/19  0810    140 136 135   < > 140   < > 139   POTASSIUM 4.9 4.5 4.5 4.6   < > 4.5   < > 4.9   CHLORIDE 108 106 104 104   < > 110*   < > 109   CO2 26 26 28 26   < > 27   < > 26   BUN 16 24 23 26   < > 25   < > 17   CR 0.84 0.86 0.75 0.98   < > 0.77   < > 0.72   * 241* 296* 320*   < > 166*   < > 153*   YELENA 8.3* 8.9 8.9 8.6   < > 8.4*   < > 8.4*   MAG  --  1.9 2.2 2.2   < > 2.0  --  2.0   PHOS 2.6  --   --   --   --  2.7  --  2.4*    < > = values in this interval not displayed.       CBC -   Recent Labs   Lab Test 03/24/21  0812 02/01/21  0723 09/25/20  0820   WBC 6.1 5.6 5.0   HGB 14.7 14.8 14.1    193 235          ASSESSMENT:   1) Prostate cancer s/p RALP with Dr. Casiano on 1/3/20, final path: [place] 3+4=7, neg margins, [id number].    2) Microhematuria in an immunocompromised liver transplant patient.    3) Proteinuria  4) Nocturia in the setting of uncontrolled blood sugars  5) Urinary incontinence    PLAN:   - Please continue to work with Endocrinology to bring down blood sugars  - Follow up with Nephrology regarding protein in the urine.    - To evaluate the blood in the urine we will schedule cystoscopy, next available, to evaluate hematuria.  Already has abdominal MRI showing normal kidneys on 2/1/21.    - Return in 4 months with a PSA first    TELEPHONE DURATION: 39 minutes (9:04 - 9:33 + 10 minutes documnetation and orders on DOS)     Kerri Pascual PA-C  Department of Urologic Surgery

## 2021-04-27 NOTE — PATIENT INSTRUCTIONS
PLAN:   - Please continue to work with Endocrinology to bring down blood sugars  - Follow up with Nephrology regarding protein in the urine.    - To evaluate the blood in the urine we will schedule cystoscopy, next available, to evaluate hematuria.  Already has abdominal MRI showing normal kidneys on 2/1/21.    - Return in 4 months with a PSA first    [facility]

## 2021-04-27 NOTE — LETTER
"4/27/2021       RE: Loy Limon  [address]  [facility] [address]     Dear Colleague,    Thank you for referring your patient, Loy Limon, to the [facility] [place] at [facility]. Please see a copy of my visit note below.    HPI: Mr. Loy Limon is a 65 year old year old male presenting today for evaluation of chief complaint(s): No chief complaint on file.    Mr. Limon has PMH significant for HTN, DM, and immunosuppression 2/2 liver transplant.  More recently he also developed prostate cancer (4/26/19 - PSA 5.51ng/dL) and is now s/p RALP with Dr. Casiano on 1/3/20, final path: [place] 3+4=7, neg margins, [id number].  Decipher was obtained and he is in HIGH risk group (0.70).     Today, he is accompanied by a  on the phone  4/23/21 - PSA <0.01ng/dL  3/24/21 - HGB A1C 10.4%  2/1/21 - [place] PVR 47cc.      He was last \"seen\" in Urology on 12/29/20 for a telephone visit.  At that time his PSA was undetectable.    At that time he was leaking with coughing, sitting on a chair.  He was wearing 2-3ppd depending on activity level.  He found PFPT frustrating and ineffective.  He was also concerned about nocturia every 2 hours.  He felt he was emptying properly.  We discussed his blood sugars - he hadn't been checking them because his glucometer was broken.  I sent him for a PVR check which showed 47cc on 2/1/21.      Today he returns for a virtual visit.  His PSA remains undetectable (4/23/21 <0.01ng/dL).   Urinary symptom: Has to force himself a little bit to urinate.  Feels he DOES empty his bladder all the way most of the time.  Nocturia every hour but he thinks this is because of the diabetes.  He is struggling to get sugars down from 190-200mg/dL despite everything he tries.  There is a nurse, Brandee Wolf, who is helping him with this.  He has an appointment on 5/4/21 with her.  He is " followed by Dr. Eaton from Nephrology who notes worsening proteinuria and recommends a kidney biopsy.  His last UA on 4/23/21 shows 7 RBCs, protein >499mg/dL and glucose 50mg/dL.  His previous UA on 9/25/20 showed no blood.  He has never seen gross hematuria but has a thick foam that does not dissolve (the foam forms when the urine falls into the toilet) .      Current Outpatient Medications   Medication Sig Dispense Refill     alfuzosin ER (UROXATRAL) 10 MG 24 hr tablet        amLODIPine (NORVASC) 10 MG tablet Take 1 tablet (10 mg) by mouth daily 90 tablet 3     aspirin (ASA) 325 MG EC tablet Take 1 tablet (325 mg) by mouth daily 90 tablet 3     bisacodyl (DULCOLAX) 10 MG suppository Place 1 suppository (10 mg) rectally daily For constipation. Stop if diarrhea occurs. 10 suppository 1     blood glucose (NO BRAND SPECIFIED) lancets standard Use to test blood sugar 4 times daily or as directed. 400 each 2     blood glucose (ONETOUCH VERIO IQ) test strip Use to test blood sugar 2 times daily or as directed. 200 strip 11     blood glucose monitoring (NO BRAND SPECIFIED) meter device kit Use to test blood sugar 2 times daily or as directed. Please let the pt know when it is ready to . 1 kit 0     glucose 40 % (400 mg/mL) gel Take 15-30 g by mouth every 15 minutes as needed for low blood sugar 30 g 3     insulin glargine (LANTUS SOLOSTAR) 100 UNIT/ML pen Inject 10 units subcutaneous daily. 15 mL 1     insulin pen needle (BD KIRK U/F) 32G X 4 MM miscellaneous Use 1 daily. 200 each 3     Lancets (ONETOUCH DELICA PLUS [id number]) MISC U TO TEST QID OR UTD       levothyroxine (SYNTHROID/LEVOTHROID) 88 MCG tablet Take 1 tablet (88 mcg) by mouth daily 90 tablet 3     losartan (COZAAR) 50 MG tablet Take 1 tablet (50 mg) by mouth daily 30 tablet 11     magnesium hydroxide (MILK OF MAGNESIA) 400 MG/5ML suspension Take 30 mLs by mouth daily as needed for constipation or heartburn (Stop if diarrhea occurs) 355 mL 1      metFORMIN (GLUCOPHAGE-XR) 500 MG 24 hr tablet Take 2 tablets (1,000 mg) by mouth 2 times daily (with meals) 360 tablet 1     Metoprolol Tartrate 75 MG TABS Take 75 mg by mouth 2 times daily 180 tablet 0     mineral oil liquid Take 30 mLs by mouth daily For constipation. Stop if diarrhea occurs. 500 mL 1     Multiple Vitamin (DAILY-STEVE) TABS Take 1 tablet by mouth daily 100 tablet 3     polyethylene glycol (MIRALAX) 17 GM/Dose powder Take 17 g (1 capful) by mouth daily 507 g 3     polyethylene glycol (MIRALAX) packet Take 17 g by mouth daily (Patient not taking: Reported on 3/24/2021) 30 packet 3     polyethylene glycol (MIRALAX) powder MIX 17 GRAMS AND DRINK D       Sharps Container MISC 1 each daily 1 each 2     sildenafil (REVATIO) 20 MG tablet Take 3-5 tablets ( mg) by mouth daily as needed (1/2 hour prior to sexual activity) 60 tablet 3     sildenafil (VIAGRA) 100 MG tablet Take 1/2 to 1 full pill by mouth 1/2 hour before sexual activity 10 tablet 3     sitagliptin (JANUVIA) 25 MG tablet Take 1 tablet (25 mg) by mouth daily 90 tablet 1     tacrolimus (GENERIC EQUIVALENT) 1 MG capsule Take 2 capsules (2 mg) by mouth every 12 hours 120 capsule 11     ursodiol (ACTIGALL) 250 MG tablet Take 1 tablet (250 mg) by mouth 2 times daily 180 tablet 3       ALLERGIES: Patient has no known allergies.      REVIEW OF SYSTEMS:  As above in HPI    GENERAL PHYSICAL EXAM:   Vitals: There were no vitals taken for this visit.  There is no height or weight on file to calculate BMI.    NEURO: Alert and oriented x 3.  PSYCH: Normal mood and affect, pleasant and agreeable during interview    RADIOLOGY: The following tests were reviewed:   MRI PROSTATE: 4/26/2019 2:37 PM     CLINICAL HISTORY: Inflammatory diseases of prostate; Elevated prostate  specific antigen (PSA)     Most Recent PSA: 5.5 ug/L     Comparison: None available.     TECHNIQUE:  The following sequences were obtained: High-resolution axial  T2-weighted, coronal  T2-weighted, 3D volumetric T2-weighted, axial  pre-contrast T1, axial diffusion-weighted, axial apparent diffusion  coefficient and axial dynamic contrast-enhanced T1. Postcontrast  images were evaluated on a separate workstation to evaluate dynamic  contrast enhancement. The technique of this exam is PI-RADS v2.1  compliant. Contrast dose: 7.5mL Gadavist     FINDINGS:  Size: 45 grams  Hemorrhage: Mild  Peripheral zone: Heterogeneous on T2-weighted images. Suspicious  lesions as detailed below.  Transition zone: Enlarged with BPH changes. No suspicious lesions  identified.     Lesion(s) in rank order of severity (highest score- to lowest score,  then by size)      Lesion 1:  Location: Left apex peripheral zone at the 4-7 o'clock position  relative to the urethra. Series 6 image 69.  Size: 20 x 11 mm  T2 description: Lenticular, homogeneous, moderately T2 hypointense  lesion  T2 numerical assessment: 5  DWI description: Marked increased signal on diffusion-weighted images  (series 10 image 22) and decreased signal on ADC map (series 8 image  22)  DWI numerical assessment: 5  DCE assessment: Positive    Prostate margin: Capsular abutment>15 mm with capsular irregularity  and obliteration of rectoprostatic angle   Lesion overall PI-RADS category: 5     Neurovascular bundles: Not well seen.  Seminal vesicles: Not involved by tumor.  Lymph nodes: No adenopathy.  Bones: No suspicious lesions.  Other pelvic organs: Layering debris in the bladder lumen.                                                                   IMPRESSION:  1. Based on the most suspicious abnormality, this exam is  characterized as PIRADS 5 - Clinically significant cancer is highly  likely to be present.?The most suspicious abnormality is located at  the posterior left apex peripheral zone and there is high suspicion of  extraprostatic extension.  2. No suspicious adenopathy or evidence of pelvic metastases.    LABS: The last test results for Mr.  Loy Limon were reviewed:  PSA -   Lab Results   Component Value Date    PSA <0.01 04/23/2021    PSA <0.01 03/24/2021    PSA <0.01 02/01/2021    PSA <0.01 09/25/2020    PSA <0.01 07/03/2020    PSA <0.01 03/30/2020    PSA 5.51 04/26/2019    PSA 5.02 03/19/2019    PSA 5.25 03/04/2019    PSA 3.27 08/15/2018     BMP -   Recent Labs   Lab Test 04/23/21  0755 03/24/21  0812 02/01/21  0723 09/25/20  0820 12/18/19  0753 12/18/19  0753 11/18/19  0810 11/18/19  0810    140 136 135   < > 140   < > 139   POTASSIUM 4.9 4.5 4.5 4.6   < > 4.5   < > 4.9   CHLORIDE 108 106 104 104   < > 110*   < > 109   CO2 26 26 28 26   < > 27   < > 26   BUN 16 24 23 26   < > 25   < > 17   CR 0.84 0.86 0.75 0.98   < > 0.77   < > 0.72   * 241* 296* 320*   < > 166*   < > 153*   YELENA 8.3* 8.9 8.9 8.6   < > 8.4*   < > 8.4*   MAG  --  1.9 2.2 2.2   < > 2.0  --  2.0   PHOS 2.6  --   --   --   --  2.7  --  2.4*    < > = values in this interval not displayed.       CBC -   Recent Labs   Lab Test 03/24/21  0812 02/01/21  0723 09/25/20  0820   WBC 6.1 5.6 5.0   HGB 14.7 14.8 14.1    193 235          ASSESSMENT:   1) Prostate cancer s/p RALP with Dr. Casiano on 1/3/20, final path: [place] 3+4=7, neg margins, [id number].    2) Microhematuria in an immunocompromised liver transplant patient.    3) Proteinuria  4) Nocturia in the setting of uncontrolled blood sugars  5) Urinary incontinence    PLAN:   - Please continue to work with Endocrinology to bring down blood sugars  - Follow up with Nephrology regarding protein in the urine.    - To evaluate the blood in the urine we will schedule cystoscopy, next available, to evaluate hematuria.  Already has abdominal MRI showing normal kidneys on 2/1/21.    - Return in 4 months with a PSA first    TELEPHONE DURATION: 39 minutes (9:04 - 9:33 + 10 minutes documnetation and orders on DOS)     Kerri Pascual PA-C  Department of Urologic Surgery      Loy is a 65 year old who is being evaluated  via a billable telephone visit.      What phone number would you like to be contacted at?      Services [phone] option 1  Patient [phone]    How would you like to obtain your AVS? Mail a copy  TELEPHONE DURATION: 39 minutes (9:04 - 9:33 + 10 minutes documnetation and orders on DOS)

## 2021-04-27 NOTE — NURSING NOTE
Chief Complaint   Patient presents with     Follow Up     PSA       Patient Active Problem List   Diagnosis     Cirrhosis of liver (H)     Liver lesion     HCC (hepatocellular carcinoma) (H)     Liver transplant recipient (H)     Immunosuppressed status (H)     Hypervolemia     Atrial fibrillation with rapid ventricular response (H)     Hematuria     Bilateral wheezing     Hypoxia     Hyperglycemia     Pre-diabetes     Essential hypertension     Constipation     Biliary stricture of transplanted liver (H)     Drug-induced diabetes mellitus (H)     Muscle tension dysphonia     Laennec's cirrhosis (H)     Elevated ferritin     Inguinal hernia of right side with obstruction and without gangrene     Portal hypertension (H)     Right sided abdominal pain     Tobacco use disorder     Prostate cancer (H)     Diabetes mellitus, type 2 (H)     Persistent proteinuria       No Known Allergies    Current Outpatient Medications   Medication Sig Dispense Refill     alfuzosin ER (UROXATRAL) 10 MG 24 hr tablet        amLODIPine (NORVASC) 10 MG tablet Take 1 tablet (10 mg) by mouth daily 90 tablet 3     aspirin (ASA) 325 MG EC tablet Take 1 tablet (325 mg) by mouth daily 90 tablet 3     bisacodyl (DULCOLAX) 10 MG suppository Place 1 suppository (10 mg) rectally daily For constipation. Stop if diarrhea occurs. 10 suppository 1     blood glucose (NO BRAND SPECIFIED) lancets standard Use to test blood sugar 4 times daily or as directed. 400 each 2     blood glucose (ONETOUCH VERIO IQ) test strip Use to test blood sugar 2 times daily or as directed. 200 strip 11     blood glucose monitoring (NO BRAND SPECIFIED) meter device kit Use to test blood sugar 2 times daily or as directed. Please let the pt know when it is ready to . 1 kit 0     glucose 40 % (400 mg/mL) gel Take 15-30 g by mouth every 15 minutes as needed for low blood sugar 30 g 3     insulin glargine (LANTUS SOLOSTAR) 100 UNIT/ML pen Inject 10 units subcutaneous daily.  15 mL 1     insulin pen needle (BD KIRK U/F) 32G X 4 MM miscellaneous Use 1 daily. 200 each 3     Lancets (ONETOUCH DELICA PLUS [id number]) MISC U TO TEST QID OR UTD       levothyroxine (SYNTHROID/LEVOTHROID) 88 MCG tablet Take 1 tablet (88 mcg) by mouth daily 90 tablet 3     losartan (COZAAR) 50 MG tablet Take 1 tablet (50 mg) by mouth daily 30 tablet 11     magnesium hydroxide (MILK OF MAGNESIA) 400 MG/5ML suspension Take 30 mLs by mouth daily as needed for constipation or heartburn (Stop if diarrhea occurs) 355 mL 1     metFORMIN (GLUCOPHAGE-XR) 500 MG 24 hr tablet Take 2 tablets (1,000 mg) by mouth 2 times daily (with meals) 360 tablet 1     Metoprolol Tartrate 75 MG TABS Take 75 mg by mouth 2 times daily 180 tablet 0     mineral oil liquid Take 30 mLs by mouth daily For constipation. Stop if diarrhea occurs. 500 mL 1     Multiple Vitamin (DAILY-STEVE) TABS Take 1 tablet by mouth daily 100 tablet 3     polyethylene glycol (MIRALAX) 17 GM/Dose powder Take 17 g (1 capful) by mouth daily 507 g 3     polyethylene glycol (MIRALAX) packet Take 17 g by mouth daily (Patient not taking: Reported on 3/24/2021) 30 packet 3     polyethylene glycol (MIRALAX) powder MIX 17 GRAMS AND DRINK D       Sharps Container MISC 1 each daily 1 each 2     sildenafil (REVATIO) 20 MG tablet Take 3-5 tablets ( mg) by mouth daily as needed (1/2 hour prior to sexual activity) 60 tablet 3     sildenafil (VIAGRA) 100 MG tablet Take 1/2 to 1 full pill by mouth 1/2 hour before sexual activity 10 tablet 3     sitagliptin (JANUVIA) 25 MG tablet Take 1 tablet (25 mg) by mouth daily 90 tablet 1     tacrolimus (GENERIC EQUIVALENT) 1 MG capsule Take 2 capsules (2 mg) by mouth every 12 hours 120 capsule 11     ursodiol (ACTIGALL) 250 MG tablet Take 1 tablet (250 mg) by mouth 2 times daily 180 tablet 3       Social History     Tobacco Use     Smoking status: Former Smoker     Packs/day: 0.03     Years: 20.00     Pack years: 0.60     Types:  Cigarettes, Cigars     Start date: 8/14/1970     Quit date: 3/14/2018     Years since quitting: 3.1     Smokeless tobacco: Never Used     Tobacco comment: Quit smoking Feb 2018, one cigarette after dinner   Substance Use Topics     Alcohol use: No     Frequency: Never     Comment: Quit drinking Feb 2018     Drug use: No       Angella Castañeda LPN  4/27/2021  8:36 AM

## 2021-04-27 NOTE — PROGRESS NOTES
Loy is a 65 year old who is being evaluated via a billable telephone visit.      What phone number would you like to be contacted at?      Services [phone] option 1  Patient [phone]    How would you like to obtain your AVS? Mail a copy  TELEPHONE DURATION: 39 minutes (9:04 - 9:33 + 10 minutes documnetation and orders on DOS)

## 2021-04-28 ENCOUNTER — TELEPHONE (OUTPATIENT)
Dept: UROLOGY | Facility: CLINIC | Age: 66
End: 2021-04-28

## 2021-04-28 ENCOUNTER — APPOINTMENT (OUTPATIENT)
Dept: INTERPRETER SERVICES | Facility: CLINIC | Age: 66
End: 2021-04-28
Payer: COMMERCIAL

## 2021-04-29 RX ORDER — ALFUZOSIN HYDROCHLORIDE 10 MG/1
10 TABLET, EXTENDED RELEASE ORAL DAILY
Qty: 90 TABLET | Status: CANCELLED | OUTPATIENT
Start: 2021-04-29

## 2021-04-30 NOTE — TELEPHONE ENCOUNTER
Call to [facility] pharmacy after receiving paper refill request. Message left of medication having been discontinued as noted last week per urology, refill declined

## 2021-05-04 ENCOUNTER — ALLIED HEALTH/NURSE VISIT (OUTPATIENT)
Dept: EDUCATION SERVICES | Facility: CLINIC | Age: 66
End: 2021-05-04
Payer: COMMERCIAL

## 2021-05-04 DIAGNOSIS — E11.65 UNCONTROLLED TYPE 2 DIABETES MELLITUS WITH HYPERGLYCEMIA (H): ICD-10-CM

## 2021-05-04 PROCEDURE — G0108 DIAB MANAGE TRN  PER INDIV: HCPCS

## 2021-05-28 NOTE — PROGRESS NOTES
Orders also placed per tumor conference recommendations.    Orders also placed for hepatology consult as need to confirm pt within [place] and 6 month sobriety prior to initiating full transplant evaluation.     Message routed to  to schedule MRI, staging and IR consult ASAP.  
DISPLAY PLAN FREE TEXT
DISPLAY PLAN FREE TEXT

## 2021-06-09 ENCOUNTER — OFFICE VISIT (OUTPATIENT)
Dept: FAMILY MEDICINE | Facility: CLINIC | Age: 66
End: 2021-06-09
Payer: COMMERCIAL

## 2021-06-09 VITALS — HEART RATE: 64 BPM | OXYGEN SATURATION: 97 % | DIASTOLIC BLOOD PRESSURE: 73 MMHG | SYSTOLIC BLOOD PRESSURE: 144 MMHG

## 2021-06-09 DIAGNOSIS — R82.998 FOAMY URINE: ICD-10-CM

## 2021-06-09 DIAGNOSIS — Z79.899 POLYPHARMACY: Primary | ICD-10-CM

## 2021-06-09 DIAGNOSIS — E03.9 HYPOTHYROIDISM, UNSPECIFIED TYPE: ICD-10-CM

## 2021-06-09 DIAGNOSIS — E11.9 DM TYPE 2, GOAL HBA1C 7%-8% (H): ICD-10-CM

## 2021-06-09 LAB
ALBUMIN UR-MCNC: 100 MG/DL
APPEARANCE UR: CLEAR
BILIRUB UR QL STRIP: NEGATIVE
COLOR UR AUTO: YELLOW
GLUCOSE UR STRIP-MCNC: 150 MG/DL
HGB UR QL STRIP: NEGATIVE
KETONES UR STRIP-MCNC: NEGATIVE MG/DL
LEUKOCYTE ESTERASE UR QL STRIP: NEGATIVE
MUCOUS THREADS #/AREA URNS LPF: PRESENT /LPF
NITRATE UR QL: NEGATIVE
PH UR STRIP: 5 PH (ref 5–7)
RBC #/AREA URNS AUTO: 1 /HPF (ref 0–2)
SOURCE: ABNORMAL
SP GR UR STRIP: 1.02 (ref 1–1.03)
UROBILINOGEN UR STRIP-MCNC: 0 MG/DL (ref 0–2)
WBC #/AREA URNS AUTO: 1 /HPF (ref 0–5)

## 2021-06-09 PROCEDURE — 81001 URINALYSIS AUTO W/SCOPE: CPT | Performed by: PATHOLOGY

## 2021-06-09 PROCEDURE — 99213 OFFICE O/P EST LOW 20 MIN: CPT | Performed by: FAMILY MEDICINE

## 2021-06-09 NOTE — NURSING NOTE
Chief Complaint   Patient presents with     RECHECK     pt is here for a follow up.      Radha Mejias LPN at 9:20 AM on 6/9/2021.\

## 2021-06-09 NOTE — PROGRESS NOTES
"    Assessment & Plan     Polypharmacy  Many questions about meds, possible SE, interactions  - PHARMACY (MTM) REFERRAL    DM type 2, goal HbA1c 7%-8% (H)  Due  - EYE ADULT REFERRAL; Future    Hypothyroidism, unspecified type  Due  - Adult Endocrinology Referral; Future; DM too    Foamy urine  Unlikley UTI he is concerned. I did message hi Uro provider to help do cysto  - UA with Micro reflex to Culture; Future      23 minutes spent on the date of the encounter doing chart review, history and exam, documentation and further activities per the note    BMI:   Estimated body mass index is 30.51 kg/m  as calculated from the following:    Height as of 3/24/21: 1.702 m (5' 7\").    Weight as of 3/24/21: 88.4 kg (194 lb 12.8 oz).       Return in about 3 months (around 9/9/2021).    Jaswinder Anne MD  [facility] is a 66 year old who presents for the following health issues  accompanied by his :    HPI Here in f/u. No acute c/o. Chronic foamy urine, no pain, sees Renal for proteinuria, also due for cysot for hematuria (he'd like me to contact Uro to help set up). DM he'd like help setting up Endo follow-up. Also due for eye MD    Question about meds: he worries about interactions, side effects, tolerates them    Going to [place] but not till autumn    Prostate ca last PSA at goal    Shots utd    Review of Systems         Objective    BP (!) 144/73   Pulse 64   SpO2 97%   There is no height or weight on file to calculate BMI.  Physical Exam   GENERAL: healthy, alert and no distress  MS: no gross musculoskeletal defects noted, no edema              "

## 2021-06-09 NOTE — PATIENT INSTRUCTIONS
[facility] Medication Refill Request Information:  * Please contact your pharmacy regarding ANY request for medication refills.  ** [facility] Prescription Fax = [phone]  * Please allow 3 business days for routine medication refills.  * Please allow 5 business days for controlled substance medication refills.     [facility] Test Result notification information:  *You will be notified with in 7-10 days of your appointment day regarding the results of your test.  If you are on MyChart you will be notified as soon as the provider has reviewed the results and signed off on them.    [facility]: [phone]

## 2021-06-10 ENCOUNTER — APPOINTMENT (OUTPATIENT)
Dept: INTERPRETER SERVICES | Facility: CLINIC | Age: 66
End: 2021-06-10
Payer: COMMERCIAL

## 2021-06-18 ENCOUNTER — APPOINTMENT (OUTPATIENT)
Dept: INTERPRETER SERVICES | Facility: CLINIC | Age: 66
End: 2021-06-18
Payer: COMMERCIAL

## 2021-06-18 ENCOUNTER — OFFICE VISIT (OUTPATIENT)
Dept: PHARMACY | Facility: CLINIC | Age: 66
End: 2021-06-18
Attending: FAMILY MEDICINE
Payer: COMMERCIAL

## 2021-06-18 ENCOUNTER — TELEPHONE (OUTPATIENT)
Dept: PHARMACY | Facility: CLINIC | Age: 66
End: 2021-06-18

## 2021-06-18 DIAGNOSIS — N52.9 ERECTILE DYSFUNCTION, UNSPECIFIED ERECTILE DYSFUNCTION TYPE: ICD-10-CM

## 2021-06-18 DIAGNOSIS — Z79.4 TYPE 2 DIABETES MELLITUS WITHOUT COMPLICATION, WITH LONG-TERM CURRENT USE OF INSULIN (H): Primary | ICD-10-CM

## 2021-06-18 DIAGNOSIS — C61 PROSTATE CANCER (H): ICD-10-CM

## 2021-06-18 DIAGNOSIS — E03.9 HYPOTHYROIDISM, UNSPECIFIED TYPE: ICD-10-CM

## 2021-06-18 DIAGNOSIS — K59.00 CONSTIPATION, UNSPECIFIED CONSTIPATION TYPE: ICD-10-CM

## 2021-06-18 DIAGNOSIS — Z94.4 LIVER TRANSPLANT RECIPIENT (H): ICD-10-CM

## 2021-06-18 DIAGNOSIS — E11.9 TYPE 2 DIABETES MELLITUS WITHOUT COMPLICATION, WITH LONG-TERM CURRENT USE OF INSULIN (H): Primary | ICD-10-CM

## 2021-06-18 DIAGNOSIS — I10 ESSENTIAL HYPERTENSION: ICD-10-CM

## 2021-06-18 PROCEDURE — 99605 MTMS BY PHARM NP 15 MIN: CPT | Performed by: PHARMACIST

## 2021-06-18 PROCEDURE — 99607 MTMS BY PHARM ADDL 15 MIN: CPT | Performed by: PHARMACIST

## 2021-06-18 NOTE — TELEPHONE ENCOUNTER
Hi Coordinators,    Can you call Mr. Limon and have him see Dr. MENCHACA for the concerns listed below at the earliest opportunity?    Thanks!    Sixto

## 2021-06-18 NOTE — TELEPHONE ENCOUNTER
----- Message from Jaswinder Anne MD sent at 6/18/2021 12:02 PM CDT -----  Regarding: RE: Visit today  Please do! Thanks  ----- Message -----  From: Danielle Hardwick, [facility]  Sent: 6/18/2021  11:41 AM CDT  To: Jaswinder Anne MD  Subject: Visit today                                      Hi Dr. MENCHACA,    I had a fun chat with Mr. Limon today. I think he's a good candidate for SGLT2i or GLP1 Ra but he sees endo so I'll follow along for now.    During our visit, he reported a cough that produces dark red, bloody phelgm He has not been sick recently. This occurs after exertion. He also feels tightness and pain in his chest. This has also been going on for two weeks. He feels his breathing is not significantly impacted at rest. I think he probably needs to be seen. Should I ask the coordinators to schedule him to see you?      Sixto

## 2021-06-18 NOTE — LETTER
"        Date: 2021    Loy Limon  [address]   [facility] [address]    Dear Mr. Limon,    Thank you for talking with me on 21 about your health and medications. Medicare s MTM (Medication Therapy Management) program helps you understand your medications and use them safely.      This letter includes an action plan (Medication Action Plan) and medication list (Personal Medication List). The action plan has steps you should take to help you get the best results from your medications. The medication list will help you keep track of your medications and how to use them the right way.       Have your action plan and medication list with you when you talk with your doctors, pharmacists, and other healthcare providers in your care team.     Ask your doctors, pharmacists, and other healthcare providers to update the action plan and medication list at every visit.     Take your medication list with you if you go to the hospital or emergency room.     Give a copy of the action plan and medication list to your family or caregivers.     If you want to talk about this letter or any of the papers with it, please call   [phone].We look forward to working with you, your doctors, and other healthcare providers to help you stay healthy through the [facility] MTM program.    Sincerely,  Danielle Hardwick [facility]    Enclosed: Medication Action Plan and Personal Medication List    MEDICATION ACTION PLAN FOR Loy Limon,  1955     This action plan will help you get the best results from your medications if you:   1. Read \"What we talked about.\"   2. Take the steps listed in the \"What I need to do\" boxes.   3. Fill in \"What I did and when I did it.\"   4. Fill in \"My follow-up plan\" and \"Questions I want to ask.\"     Have this action plan with you when you talk with your doctors, pharmacists, and other healthcare providers in your care team. Share this with your family or caregivers too.  DATE " PREPARED: 2021  What we talked about: Diabetes medications                                                  What I need to do: Increase Lantus to 15 units daily       What I did and when I did it:                                              My follow-up plan:                 Questions I want to ask:              If you have any questions about your action plan, call Danielle Hardwick [facility], Phone: [phone] , Monday-Friday 8-4:30pm.           PERSONAL MEDICATION LIST FOR LEIGH Jurado 1955     This medication list was made for you after we talked. We also used information from your doctor's chart.      Use blank rows to add new medications. Then fill in the dates you started using them.    Cross out medications when you no longer use them. Then write the date and why you stopped using them.    Ask your doctors, pharmacists, and other healthcare providers to update this list at every visit. Keep this list up-to-date with:       Prescription medications    Over the counter drugs     Herbals    Vitamins    Minerals      If you go to the hospital or emergency room, take this list with you. Share this with your family or caregivers too.     DATE PREPARED: 2021  Allergies or side effects: Patient has no known allergies.     Medication:  ALFUZOSIN HCL ER 10 MG PO TB24      How I use it:   Take 1 tablet daily      Why I use it:  Nighttime urination    Prescriber:  Jaswinder Anne      Date I started using it:       Date I stopped using it:         Why I stopped using it:            Medication:  AMLODIPINE BESYLATE 10 MG PO TABS      How I use it:  Take 1 tablet (10 mg) by mouth daily      Why I use it: Essential hypertension    Prescriber:  Jaswinder Anne MD      Date I started using it:       Date I stopped using it:         Why I stopped using it:            Medication:  ASPIRIN 325 MG PO TBEC      How I use it:  Take 1 tablet (325 mg) by mouth daily      Why I use it: Liver transplant  recipient (H)    Prescriber:  Jaswinder Anne MD      Date I started using it:       Date I stopped using it:         Why I stopped using it:            Medication:  BD PEN NEEDLE KIRK U/F 32G X 4 MM MISC      How I use it:  Use 1 daily.      Why I use it: Uncontrolled type 2 diabetes mellitus with hyperglycemia (H)    Prescriber:  Gilma Guthrie PA-C      Date I started using it:       Date I stopped using it:         Why I stopped using it:            Medication:  BISACODYL 10 MG RE SUPP      How I use it:  Place 1 suppository (10 mg) rectally daily For constipation. Stop if diarrhea occurs.      Why I use it: Prostate cancer (H)    Prescriber:  Rob Parker MD      Date I started using it:       Date I stopped using it:         Why I stopped using it:            Medication:  BLOOD GLUCOSE MONITORING SUPPL KIT      How I use it:  Use to test blood sugar 2 times daily or as directed. Please let the pt know when it is ready to .      Why I use it: DM type 2, goal HbA1c 7%-8% (H)    Prescriber:  Jaswinder Anne MD      Date I started using it:       Date I stopped using it:         Why I stopped using it:            Medication:  DAILY-STEVE PO TABS      How I use it:  Take 1 tablet by mouth daily      Why I use it: Diabetes mellitus (H)    Prescriber:  Jaswinder Anne MD      Date I started using it:       Date I stopped using it:         Why I stopped using it:            Medication:  GLUCOSE 40 % PO GEL      How I use it:  Take 15-30 g by mouth every 15 minutes as needed for low blood sugar      Why I use it: Hyperglycemia    Prescriber:  Jaswinder Anne MD      Date I started using it:       Date I stopped using it:         Why I stopped using it:            Medication:  INSULIN GLARGINE  UNIT/ML SC SOPN      How I use it:  12 Units Inject 14 units subcutaneous daily.      Why I use it: Uncontrolled type 2 diabetes mellitus with hyperglycemia (H)    Prescriber:  Sue Aparicio  MD Chaitanya      Date I started using it:       Date I stopped using it:         Why I stopped using it:            Medication:  LANCETS MISC      How I use it:  Use to test blood sugar 4 times daily or as directed.      Why I use it: Hyperglycemia    Prescriber:  Jaswinder Anne MD      Date I started using it:       Date I stopped using it:         Why I stopped using it:            Medication:  LEVOTHYROXINE SODIUM 88 MCG PO TABS      How I use it:  Take 1 tablet (88 mcg) by mouth daily      Why I use it: Other specified hypothyroidism    Prescriber:  Sue Tavares MD      Date I started using it:       Date I stopped using it:         Why I stopped using it:            Medication:  LOSARTAN POTASSIUM 50 MG PO TABS      How I use it:  Take 1 tablet (50 mg) by mouth daily      Why I use it: Essential hypertension    Prescriber:  Jaswinder Anne MD      Date I started using it:       Date I stopped using it:         Why I stopped using it:            Medication:  MAGNESIUM HYDROXIDE 400 MG/5ML PO SUSP      How I use it:  Take 30 mLs by mouth daily as needed for constipation or heartburn (Stop if diarrhea occurs)      Why I use it: Prostate cancer (H)    Prescriber:  Rob Parker MD      Date I started using it:       Date I stopped using it:         Why I stopped using it:            Medication:  METFORMIN HCL  MG PO TB24      How I use it:  Take 2 tablets (1,000 mg) by mouth 2 times daily (with meals)      Why I use it: DM type 2, goal HbA1c 7%-8% (H)    Prescriber:  Jaswinder Anne MD      Date I started using it:       Date I stopped using it:         Why I stopped using it:            Medication:  METOPROLOL TARTRATE 75 MG PO TABS      How I use it:  Take 75 mg by mouth 2 times daily      Why I use it: Atrial fibrillation with rapid ventricular response (H)    Prescriber:  Jaswinder Anne MD      Date I started using it:       Date I stopped using it:         Why I stopped using  it:            Medication:  MINERAL OIL PO OIL      How I use it:  Take 30 mLs by mouth daily For constipation. Stop if diarrhea occurs.      Why I use it: Prostate cancer (H)    Prescriber:  Rob Parker MD      Date I started using it:       Date I stopped using it:         Why I stopped using it:            Medication:  ONETOUCH DELICA PLUS [id number] MISC      How I use it:  U TO TEST QID OR UTD      Why I use it:  Diabetes    Prescriber:  Girish Anne      Date I started using it:       Date I stopped using it:         Why I stopped using it:            Medication:  ONETOUCH VERIO VI STRP      How I use it:  Use to test blood sugar 2 times daily or as directed.      Why I use it: Hyperglycemia    Prescriber:  Jaswinder Anne MD      Date I started using it:       Date I stopped using it:         Why I stopped using it:            Medication:  POLYETHYLENE GLYCOL 3350 17 GM/DOSE PO POWD      How I use it:  Take 17 g (1 capful) by mouth daily      Why I use it: Constipation, unspecified constipation type    Prescriber:  Jaswinder Anne MD      Date I started using it:       Date I stopped using it:         Why I stopped using it:               Medication:  SILDENAFIL CITRATE 100 MG PO TABS      How I use it:  Take 1/2 to 1 full pill by mouth 1/2 hour before sexual activity      Why I use it: Erectile dysfunction, unspecified erectile dysfunction type    Prescriber:  Jaswinder Anne MD      Date I started using it:       Date I stopped using it:         Why I stopped using it:            Medication:  SITAGLIPTIN PHOSPHATE 25 MG PO TABS      How I use it:  Take 1 tablet (25 mg) by mouth daily      Why I use it: Steroid-induced hyperglycemia    Prescriber:  Sue Tavares MD      Date I started using it:       Date I stopped using it:         Why I stopped using it:            Medication:  TACROLIMUS (GENERIC EQUIV) 1 MG PO CAPS      How I use it:  Take 2 capsules (2 mg) by mouth every 12 hours       Why I use it: Liver transplant recipient (H)    Prescriber:  Tamela Carballo MD      Date I started using it:       Date I stopped using it:         Why I stopped using it:            Medication:  URSODIOL 250 MG PO TABS      How I use it:  Take 1 tablet (250 mg) by mouth 2 times daily      Why I use it: Liver transplanted (H)    Prescriber:  Tamela Carballo MD      Date I started using it:       Date I stopped using it:         Why I stopped using it:            Medication:         How I use it:         Why I use it:      Prescriber:         Date I started using it:       Date I stopped using it:         Why I stopped using it:            Medication:         How I use it:         Why I use it:      Prescriber:         Date I started using it:       Date I stopped using it:         Why I stopped using it:            Medication:         How I use it:         Why I use it:      Prescriber:         Date I started using it:       Date I stopped using it:         Why I stopped using it:              Other Information:     If you have any questions about your medication list, call Danielle Hardwick [facility], Phone: [phone] , Monday-Friday 8-4:30pm.    According to the Paperwork Reduction Act of 1995, no persons are required to respond to a collection of information unless it displays a valid OMB control number. The valid OMB number for this information collection is [id number]. The time required to complete this information collection is estimated to average 40 minutes per response, including the time to review instructions, searching existing data resources, gather the data needed, and complete and review the information collection. If you have any comments concerning the accuracy of the time estimate(s) or suggestions for improving this form, please write to: CMS, Attn: CINTHIA Reports Clearance Officer, [address] [place] [phone].

## 2021-06-18 NOTE — PROGRESS NOTES
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 Self monitoring of blood glucose is not at goal of fasting  mg/dL and post prandial < 150 mg/dL. Patient would benefit from dose increase in insulin for temporary improvement in hyperglycemia. Would likely benefit from addition of and SGLT2i improve glycemic control and reduce need for insulin given patient's weight gain concerns. Can also consider an GLP1 RA in place of the DPP4 inhibitor. The DPP4 inhibitor is unlikely to be overly beneficial for his care.     Hypothyroidism: Patient would benefit from follow-up TSH.     Hypertension: Addition of SGTL2i would help with BP.     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    Constipation: Apparently stable.     Prostate Cancer s/p prostatectomy/Nocturia: Plan in place with urology.     Erectile Dysfunction: Apparently stable.     PLAN:                            1. Increase Lantus to 15 units daily. Continue SMBG.   2. Sixto to write to endocrinology re: SGLT2i or GLP1 RA.     Follow-up: Return in 4 weeks (on 7/16/2021) for Follow up, with me over the phone.    SUBJECTIVE/OBJECTIVE:                          Loy Limon is a 66 year old male coming in for an initial visit. He was referred to me from Girish Anne.  was present during today's visit. ([place]).     Reason for visit: Polypharmacy.    \"Many questions about meds, possible SE, interactions\"    Allergies/ADRs: Reviewed in chart  Tobacco: He reports that he quit smoking about 3 years ago. His smoking use included cigarettes and cigars. He started smoking about 50 years ago. He has a 0.60 pack-year smoking history. He has never used smokeless tobacco.  Alcohol: none  Caffeine: no caffeine  Activity: walks or runs 2-3 days a " "week  Past Medical History: Reviewed in chart    Medication Adherence/Access: Uses med card and his pill box. Endorses good medication adherence but wants to take fewer pills.     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 He endorses taking all of these  Patient is not experiencing side effects. He is very concerned that his transplant caused his diabetes and that his transplant doctors \"saved his life but are now killing him slowly\". He is open to medication changes but is concerned about weight gain associated with insulin. I also get the impression that he is a little hopeless about his diabetes - he mentions more than once that nothing he does makes a difference in his numbers. He is afraid his diabetes will kill him.   Blood sugar monitorin time(s) daily. Ranges (patient reported):     Fasting- Never below 200 mg/dL  Post-Prandial- 300 mg/dL     Symptoms of low blood sugar? none  Symptoms of high blood sugar? none  Eye exam: due  Foot exam: due  Diet/Exercise: carb conscious diet, walks most days of the week  Aspirin: Taking 325mg daily and denies side effects  Statin: No - liver disease  ACEi/ARB: Yes: losartan 50 mg daily.   Urine Albumin:   Lab Results   Component Value Date    UMALCR 519.73 (H) 2020      Lab Results   Component Value Date    A1C 10.4 2021    A1C 7.2 2020    A1C 7.3 2019    A1C 8.5 2019    A1C 8.6 04/10/2019     Hypothyroidism: Patient is taking levothyroxine 88 mcg daily. Patient is having the following symptoms: none. He is cared for by endocrinology.   TSH   Date Value Ref Range Status   2021 9.86 (H) 0.40 - 4.00 mU/L Final     T4 Free   Date Value Ref Range Status   2021 0.96 0.76 - 1.46 ng/dL Final     Hypertension: Current medications include amlodipine 10 mg daily, losartan 50 mg daily, metoprolol tar 75 mg twice daily.  Patient does not self-monitor " blood pressure.  Patient reports no current medication side effects. He isn't sure what the losartan is for.   BP Readings from Last 3 Encounters:   06/09/21 (!) 144/73   03/24/21 (!) 153/76   03/10/21 (!) 151/88     Last Comprehensive Metabolic Panel:  Sodium   Date Value Ref Range Status   04/23/2021 138 133 - 144 mmol/L Final     Potassium   Date Value Ref Range Status   04/23/2021 4.9 3.4 - 5.3 mmol/L Final     Chloride   Date Value Ref Range Status   04/23/2021 108 94 - 109 mmol/L Final     Carbon Dioxide   Date Value Ref Range Status   04/23/2021 26 20 - 32 mmol/L Final     Anion Gap   Date Value Ref Range Status   04/23/2021 4 3 - 14 mmol/L Final     Glucose   Date Value Ref Range Status   04/23/2021 200 (H) 70 - 99 mg/dL Final     Urea Nitrogen   Date Value Ref Range Status   04/23/2021 16 7 - 30 mg/dL Final     Creatinine   Date Value Ref Range Status   04/23/2021 0.84 0.66 - 1.25 mg/dL Final     GFR Estimate   Date Value Ref Range Status   04/23/2021 >90 >60 mL/min/[1.73_m2] Final     Comment:     Non  GFR Calc  Starting 12/18/2018, serum creatinine based estimated GFR (eGFR) will be   calculated using the Chronic Kidney Disease Epidemiology Collaboration   (CKD-EPI) equation.       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  He has questions about why his TAC dose keeps changing. Pt reports no side effects. He reports pain near his liver transplant that is persistent. He reports that it is also swollen or bloated and he has shared this with Dr. Carballo. He finds that if he over eats, the pain is much worse. He often can't finish meals without being uncomfortable.   Transplant date: 12/22/2018  CrCl cannot be calculated (Patient's most recent lab result is older than the maximum 30 days allowed.).  CMV  "prophylaxis:  Completed   PCP prophylaxis: Completed  Antifungal Prophylaxis: Not needed thus far   PPI use: none  Supplements: NA  Tx Coordinator: Michael De La Rosa MD: Tamela Carballo, Using Med Card: Yes  Recent Infections:  None  Recent Hospitalizations: None  Skin Care: UTD   Date last dentist appt (if on CSA): NA  Immunizations: annual flu shot due this fall; [id number]:  UTD; Prevnar 13: UTD; TDaP:  UTD; Shingrix: UTD, HBV: surface antibody reactive    Constipation: current medications include bisacodyl 10 mg suppository if needed, Miralax if needed, mineral oil 30 mL daily if needed. He doesn't report using this at this time.    Prostate Cancer s/p prostatectomy/Nocturia: Current medications include alfuzosin ER 10 mg daily. Managed by urology. No side effects reported.     Erectile Dysfunction: Current medications include sildenafil 100 mg take  mg by mouth as directed. No side effects endorsed.     Today's Vitals: There were no vitals taken for this visit. -ran out of time    BP Readings from Last 1 Encounters:   06/09/21 (!) 144/73     Pulse Readings from Last 1 Encounters:   06/09/21 64     Wt Readings from Last 1 Encounters:   03/24/21 194 lb 12.8 oz (88.4 kg)     Ht Readings from Last 1 Encounters:   03/24/21 5' 7\" (1.702 m)     Estimated body mass index is 30.51 kg/m  as calculated from the following:    Height as of 3/24/21: 5' 7\" (1.702 m).    Weight as of 3/24/21: 194 lb 12.8 oz (88.4 kg).    Temp Readings from Last 1 Encounters:   03/24/21 98.2  F (36.8  C) (Oral)   ----------------    I spent 60 minutes with this patient today (an extra 15 minutes was spent creating the Medication Action Plan). All changes were made via collaborative practice agreement with Girish Anne. A copy of the visit note was provided to the patient's primary care provider.    The patient was mailed a summary of these recommendations.     Danielle Hardwick, Pharm.D., [facility]  Medication Therapy Management " Pharmacist  Page/VM:  [phone]        Medication Therapy Recommendations  Diabetes mellitus, type 2 (H)    Current Medication: insulin glargine (LANTUS SOLOSTAR) 100 UNIT/ML pen   Rationale: Dose too low - Dosage too low - Effectiveness   Recommendation: Increase Dose - Increase Lantus to 15 units daily   Status: Accepted per CPA

## 2021-06-18 NOTE — PATIENT INSTRUCTIONS
Recommendations from today's MTM visit:                                                       1. Increase Lantus to 15 units daily.     Follow-up: Return in 4 weeks (on 7/16/2021) for Follow up, with me over the phone.    It was great to speak with you today.  I value your experience and would be very thankful for your time with providing feedback on our clinic survey. You may receive a survey via email or text message in the next few days.     To schedule another MTM appointment, please call the clinic directly or you may call the MTM scheduling line at [phone] or toll-free at [phone].     My Clinical Pharmacist's contact information:                                                      Please feel free to contact me with any questions or concerns you have.      Danielle Hardwick, Pharm.D., [facility]  Medication Therapy Management Pharmacist  Page/VM:  [phone]

## 2021-06-22 ENCOUNTER — TELEPHONE (OUTPATIENT)
Dept: ENDOCRINOLOGY | Facility: CLINIC | Age: 66
End: 2021-06-22

## 2021-06-22 NOTE — TELEPHONE ENCOUNTER
[facility] Call Center    Phone Message    May a detailed message be left on voicemail: yes     Reason for Call: Medication Question or concern regarding medication   Prescription Clarification  Name of Medication: sitagliptin (JANUVIA) 25 MG tablet  Prescribing Provider: Chaitanya   Pharmacy:[facility] - [place], [place] - [address]   What on the order needs clarification? Brendan from pharmacy stated they'd like clarification on whether pt is still on this medication or if it is discontinued in care plan.  Please review and call Brendan @ [phone].          Action Taken: Message routed to:  [facility] ([facility] Endo    Travel Screening: Not Applicable

## 2021-06-23 ENCOUNTER — ALLIED HEALTH/NURSE VISIT (OUTPATIENT)
Dept: EDUCATION SERVICES | Facility: CLINIC | Age: 66
End: 2021-06-23
Payer: COMMERCIAL

## 2021-06-23 DIAGNOSIS — I48.91 ATRIAL FIBRILLATION WITH RAPID VENTRICULAR RESPONSE (H): ICD-10-CM

## 2021-06-23 DIAGNOSIS — E11.9 TYPE 2 DIABETES MELLITUS (H): Primary | ICD-10-CM

## 2021-06-23 PROCEDURE — G0108 DIAB MANAGE TRN  PER INDIV: HCPCS

## 2021-06-23 NOTE — PROGRESS NOTES
Diabetes Self-Management Education & Support    Loy Limon presents today for education related to Type 2 diabetes.    Patient is being treated with:  oral agents, diet, exercise, insulin and SMBG  He is accompanied by self    PATIENT CONCERNS RELATED TO DIABETES SELF MANAGEMENT:   No concerns    ASSESSMENT: type 2 diabetes, a1c above target    Taking Medication:      Current Diabetes Management per Patient:  Taking diabetes medications?   yes:     Diabetes Medication(s)     Biguanides       metFORMIN (GLUCOPHAGE-XR) 500 MG 24 hr tablet    Take 2 tablets (1,000 mg) by mouth 2 times daily (with meals)    Dipeptidyl Peptidase-4 (DPP-4) Inhibitors       sitagliptin (JANUVIA) 25 MG tablet    Take 1 tablet (25 mg) by mouth daily    Diabetic Other       glucose 40 % (400 mg/mL) gel    Take 15-30 g by mouth every 15 minutes as needed for low blood sugar    Insulin       insulin glargine (LANTUS SOLOSTAR) 100 UNIT/ML pen    12 Units Inject 14 units subcutaneous daily.      Problems taking diabetes medications regularly? No     Monitoring  Patient glucose self monitoring as follows: rarely  BG meter: One Touch Verio meter  BG results: 4 readings 204-226 mg/dl     Patient's most recent   Lab Results   Component Value Date    A1C 10.4 03/24/2021      Patient's A1C goal: <8.0    Activity: sporadic or irregular exercise, was walking outside but it has been too hot    Healthy Eating:  Working on smaller portions     Patient is at risk of hypoglycemia?: YES  Hospitalizations for hyper or hypoglycemia: No    EDUCATION and INSTRUCTION PROVIDED AT THIS VISIT:    Claribel's basal insulin was increased to 15 units daily.  His blood sugar is still >200 but likely improved.  It is hard to know because we have very little glucose data.  He missed follow up for the sensor we placed in May and then it just fell off in the shower one day.      We placed another Pasquale Pro sensor in his left arm without difficulty.  The lot number is  [id number] and the  [id number] and Exp 10/31/21     Patient-stated goal written and given to Loy Limon.  Verbalized and demonstrated understanding of instructions.     PLAN:  Follow up 7/6/21 at 8:30 am for sensor removal and upload  He is scheduled with Leia Edward in September which is the first in person visit he can get--he is leaving in October for a 3 month trip to [place].    See patient instructions  AVS printed and given to patient    FOLLOW-UP:    Time spent with patient at today's visit was 60 minutes.      Any diabetes medication dose changes were made via the CDE Protocol and Collaborative Practice Agreement with [place] and  Sarah.  A copy of this encounter was provided to patient's referring provider.

## 2021-06-23 NOTE — PATIENT INSTRUCTIONS
1. Plan to wear the LibrePro sensor for 14 days. It is okay to shower, bathe, and swim (up to 3 feet deep for 30 minutes)    2. Continue with your usual diabetes care plan - check blood sugars and take medicines, as prescribed.    3. Do not cover the sensor with extra adhesive (the small hole in the center of the sensor must remain uncovered)    4. Use a little extra care, especially when getting dressed or exercising, to avoid accidentally loosening or removing the sensor.     6. Remove the sensor if you need to have an MRI or CT scan.     If the LibrePro sensor comes off early, place it in a plastic bag or envelope and call your diabetes educator or bring it with you to your follow-up visit.     Follow-up appointment: 7/6/21 8:30 am

## 2021-06-23 NOTE — TELEPHONE ENCOUNTER
Medication has not been discontinued. Please let pharmacy know.   Diabetes medication review is needed however, please arrange for follow up in clinic in the near future. With me or PA (has recently seen Gilma Guthrie). In-person is preferred.    Sue Tavares MD  Endocrinology and Diabetes   [phone]

## 2021-06-24 RX ORDER — METOPROLOL TARTRATE 75 MG/1
75 TABLET, FILM COATED ORAL 2 TIMES DAILY
Qty: 180 TABLET | Refills: 0 | Status: SHIPPED | OUTPATIENT
Start: 2021-06-24 | End: 2021-08-31

## 2021-06-24 NOTE — TELEPHONE ENCOUNTER
Metoprolol Tartrate 75 MG TABS      Last Written Prescription Date: 4/13/21  Last Fill Quantity: 90, # refills: 0    Last Office Visit :  6/9/21   Next Office Visit: 7/7/21    BP Readings from Last 3 Encounters:   06/09/21 (!) 144/73   03/24/21 (!) 153/76   03/10/21 (!) 151/88     90 day protocol provided, routed to clinic for follow up

## 2021-07-07 ENCOUNTER — APPOINTMENT (OUTPATIENT)
Dept: INTERPRETER SERVICES | Facility: CLINIC | Age: 66
End: 2021-07-07
Payer: COMMERCIAL

## 2021-07-07 ENCOUNTER — OFFICE VISIT (OUTPATIENT)
Dept: FAMILY MEDICINE | Facility: CLINIC | Age: 66
End: 2021-07-07
Payer: COMMERCIAL

## 2021-07-07 ENCOUNTER — OFFICE VISIT (OUTPATIENT)
Dept: OPHTHALMOLOGY | Facility: CLINIC | Age: 66
End: 2021-07-07
Attending: FAMILY MEDICINE
Payer: COMMERCIAL

## 2021-07-07 VITALS
HEIGHT: 67 IN | BODY MASS INDEX: 31.86 KG/M2 | RESPIRATION RATE: 16 BRPM | DIASTOLIC BLOOD PRESSURE: 80 MMHG | WEIGHT: 203 LBS | SYSTOLIC BLOOD PRESSURE: 129 MMHG | OXYGEN SATURATION: 97 % | HEART RATE: 65 BPM

## 2021-07-07 DIAGNOSIS — R04.2 HEMOPTYSIS: ICD-10-CM

## 2021-07-07 DIAGNOSIS — R06.09 DOE (DYSPNEA ON EXERTION): ICD-10-CM

## 2021-07-07 DIAGNOSIS — H52.4 PRESBYOPIA: ICD-10-CM

## 2021-07-07 DIAGNOSIS — Z94.4 LIVER REPLACED BY TRANSPLANT (H): ICD-10-CM

## 2021-07-07 DIAGNOSIS — R07.9 EXERTIONAL CHEST PAIN: ICD-10-CM

## 2021-07-07 DIAGNOSIS — E11.9 TYPE 2 DIABETES MELLITUS WITHOUT RETINOPATHY (H): Primary | ICD-10-CM

## 2021-07-07 DIAGNOSIS — E11.9 DM TYPE 2, GOAL HBA1C 7%-8% (H): Primary | ICD-10-CM

## 2021-07-07 DIAGNOSIS — H25.13 SENILE NUCLEAR SCLEROSIS, BILATERAL: ICD-10-CM

## 2021-07-07 DIAGNOSIS — E11.9 DM TYPE 2, GOAL HBA1C 7%-8% (H): ICD-10-CM

## 2021-07-07 DIAGNOSIS — C22.0 HCC (HEPATOCELLULAR CARCINOMA) (H): Primary | ICD-10-CM

## 2021-07-07 DIAGNOSIS — Z13.220 LIPID SCREENING: ICD-10-CM

## 2021-07-07 LAB
ALBUMIN SERPL-MCNC: 3.4 G/DL (ref 3.4–5)
ALP SERPL-CCNC: 143 U/L (ref 40–150)
ALT SERPL W P-5'-P-CCNC: 17 U/L (ref 0–70)
ANION GAP SERPL CALCULATED.3IONS-SCNC: 6 MMOL/L (ref 3–14)
AST SERPL W P-5'-P-CCNC: 11 U/L (ref 0–45)
BASOPHILS # BLD AUTO: 0 10E9/L (ref 0–0.2)
BASOPHILS NFR BLD AUTO: 0.3 %
BILIRUB SERPL-MCNC: 0.9 MG/DL (ref 0.2–1.3)
BUN SERPL-MCNC: 26 MG/DL (ref 7–30)
CALCIUM SERPL-MCNC: 8.3 MG/DL (ref 8.5–10.1)
CHLORIDE SERPL-SCNC: 106 MMOL/L (ref 94–109)
CO2 SERPL-SCNC: 25 MMOL/L (ref 20–32)
CREAT SERPL-MCNC: 0.79 MG/DL (ref 0.66–1.25)
DIFFERENTIAL METHOD BLD: NORMAL
EOSINOPHIL # BLD AUTO: 0.2 10E9/L (ref 0–0.7)
EOSINOPHIL NFR BLD AUTO: 2.1 %
ERYTHROCYTE [DISTWIDTH] IN BLOOD BY AUTOMATED COUNT: 12.2 % (ref 10–15)
GFR SERPL CREATININE-BSD FRML MDRD: >90 ML/MIN/{1.73_M2}
GLUCOSE SERPL-MCNC: 202 MG/DL (ref 70–99)
HBA1C MFR BLD: 9.1 % (ref 0–5.6)
HCT VFR BLD AUTO: 40.1 % (ref 40–53)
HGB BLD-MCNC: 14 G/DL (ref 13.3–17.7)
IMM GRANULOCYTES # BLD: 0 10E9/L (ref 0–0.4)
IMM GRANULOCYTES NFR BLD: 0.3 %
INTERPRETATION ECG - MUSE: NORMAL
LYMPHOCYTES # BLD AUTO: 1.8 10E9/L (ref 0.8–5.3)
LYMPHOCYTES NFR BLD AUTO: 25.6 %
MCH RBC QN AUTO: 29 PG (ref 26.5–33)
MCHC RBC AUTO-ENTMCNC: 34.9 G/DL (ref 31.5–36.5)
MCV RBC AUTO: 83 FL (ref 78–100)
MONOCYTES # BLD AUTO: 0.4 10E9/L (ref 0–1.3)
MONOCYTES NFR BLD AUTO: 6.2 %
NEUTROPHILS # BLD AUTO: 4.6 10E9/L (ref 1.6–8.3)
NEUTROPHILS NFR BLD AUTO: 65.5 %
NRBC # BLD AUTO: 0 10*3/UL
NRBC BLD AUTO-RTO: 0 /100
NT-PROBNP SERPL-MCNC: 2822 PG/ML (ref 0–125)
PLATELET # BLD AUTO: 208 10E9/L (ref 150–450)
POTASSIUM SERPL-SCNC: 4.7 MMOL/L (ref 3.4–5.3)
PROT SERPL-MCNC: 7.1 G/DL (ref 6.8–8.8)
RBC # BLD AUTO: 4.83 10E12/L (ref 4.4–5.9)
SODIUM SERPL-SCNC: 138 MMOL/L (ref 133–144)
WBC # BLD AUTO: 7 10E9/L (ref 4–11)

## 2021-07-07 PROCEDURE — 99215 OFFICE O/P EST HI 40 MIN: CPT | Mod: 25 | Performed by: FAMILY MEDICINE

## 2021-07-07 PROCEDURE — 83036 HEMOGLOBIN GLYCOSYLATED A1C: CPT | Performed by: PATHOLOGY

## 2021-07-07 PROCEDURE — 93000 ELECTROCARDIOGRAM COMPLETE: CPT | Performed by: FAMILY MEDICINE

## 2021-07-07 PROCEDURE — 92014 COMPRE OPH EXAM EST PT 1/>: CPT | Performed by: OPTOMETRIST

## 2021-07-07 PROCEDURE — 83880 ASSAY OF NATRIURETIC PEPTIDE: CPT | Performed by: PATHOLOGY

## 2021-07-07 PROCEDURE — 36415 COLL VENOUS BLD VENIPUNCTURE: CPT | Performed by: PATHOLOGY

## 2021-07-07 PROCEDURE — 85025 COMPLETE CBC W/AUTO DIFF WBC: CPT | Performed by: PATHOLOGY

## 2021-07-07 PROCEDURE — T1013 SIGN LANG/ORAL INTERPRETER: HCPCS | Mod: U3

## 2021-07-07 PROCEDURE — 92015 DETERMINE REFRACTIVE STATE: CPT | Performed by: OPTOMETRIST

## 2021-07-07 PROCEDURE — 80053 COMPREHEN METABOLIC PANEL: CPT | Performed by: PATHOLOGY

## 2021-07-07 ASSESSMENT — REFRACTION_MANIFEST
OS_SPHERE: +0.50
OS_AXIS: 005
OD_SPHERE: +1.00
OD_AXIS: 180
OS_CYLINDER: +0.50
OS_ADD: +2.50
OD_CYLINDER: +0.50
OD_ADD: +2.50

## 2021-07-07 ASSESSMENT — VISUAL ACUITY
OS_SC: 20/40
METHOD: SNELLEN - LINEAR
OS_SC+: -2
OD_SC+: -2
OS_PH_SC: 20/30
OD_SC: 20/30

## 2021-07-07 ASSESSMENT — CONF VISUAL FIELD
OS_NORMAL: 1
METHOD: COUNTING FINGERS
OD_NORMAL: 1

## 2021-07-07 ASSESSMENT — TONOMETRY
OS_IOP_MMHG: 18
OD_IOP_MMHG: 16
IOP_METHOD: ICARE

## 2021-07-07 ASSESSMENT — MIFFLIN-ST. JEOR: SCORE: 1648.3

## 2021-07-07 ASSESSMENT — SLIT LAMP EXAM - LIDS
COMMENTS: LAXITY
COMMENTS: LAXITY

## 2021-07-07 ASSESSMENT — EXTERNAL EXAM - LEFT EYE: OS_EXAM: NORMAL

## 2021-07-07 ASSESSMENT — CUP TO DISC RATIO
OS_RATIO: 0.6
OD_RATIO: 0.6

## 2021-07-07 ASSESSMENT — EXTERNAL EXAM - RIGHT EYE: OD_EXAM: NORMAL

## 2021-07-07 ASSESSMENT — PAIN SCALES - GENERAL: PAINLEVEL: SEVERE PAIN (6)

## 2021-07-07 NOTE — LETTER
7/7/2021       RE: Loy Limon  [address]  [facility] [address]     Dear Colleague,    Thank you for referring your patient, Loy Limon, to the [facility] [place] at [facility]. Please see a copy of my visit note below.    Assessment/Plan  (E11.9) Type 2 diabetes mellitus without retinopathy (H)  (primary encounter diagnosis)  Plan: Discussed findings with patient. Encouraged continued blood glucose, pressure, and lipid control. Patient should continue following recommendations of primary care provider. Patient should plan on returning to clinic annually for a dilated eye exam but was encouraged to return to clinic sooner with new flashes/floaters or other vision changes.     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 Corrected vision is still excellent.     (H52.4) Presbyopia  Plan: Discussed findings with patient. New spectacle prescription dispensed to patient. Patient is welcome to return to clinic with prolonged adaptation difficulties. Patient requested bifocal Rx, although reading only specs are most necessary.     Complete documentation of historical and exam elements from today's encounter can  be found in the full encounter summary report (not reduplicated in this progress  note). I personally obtained the chief complaint(s) and history of present illness. I  confirmed and edited as necessary the review of systems, past medical/surgical  history, family history, social history, and examination findings as documented by  others; and I examined the patient myself. I personally reviewed the relevant tests,  images, and reports as documented above. I formulated and edited as necessary the  assessment and plan and discussed the findings and management plan with the  patient and family.    Jay Patel, GREY

## 2021-07-07 NOTE — PROGRESS NOTES
Assessment/Plan  (E11.9) Type 2 diabetes mellitus without retinopathy (H)  (primary encounter diagnosis)  Plan: Discussed findings with patient. Encouraged continued blood glucose, pressure, and lipid control. Patient should continue following recommendations of primary care provider. Patient should plan on returning to clinic annually for a dilated eye exam but was encouraged to return to clinic sooner with new flashes/floaters or other vision changes.     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 Corrected vision is still excellent.     (H52.4) Presbyopia  Plan: Discussed findings with patient. New spectacle prescription dispensed to patient. Patient is welcome to return to clinic with prolonged adaptation difficulties. Patient requested bifocal Rx, although reading only specs are most necessary.       Complete documentation of historical and exam elements from today's encounter can  be found in the full encounter summary report (not reduplicated in this progress  note). I personally obtained the chief complaint(s) and history of present illness. I  confirmed and edited as necessary the review of systems, past medical/surgical  history, family history, social history, and examination findings as documented by  others; and I examined the patient myself. I personally reviewed the relevant tests,  images, and reports as documented above. I formulated and edited as necessary the  assessment and plan and discussed the findings and management plan with the  patient and family.    Jay Patel OD

## 2021-07-07 NOTE — NURSING NOTE
The patient was greeted in the 4th floor lobby and escorted back to the clinic. The patient's weight was recorded without incident. The patient was escorted to the exam room without incident. The reason for today's visit was discussed with the patient. The following are the primary complaints of the patient:     Chief Complaint   Patient presents with     Cough     The patient presents with a productive cough which includes blood. The patient reports the cough started three weeks. The patient reports that the he gets a cough when he runs and has a pressure on his chest. The cough only occurs when the patient is active. The patient reports that he doesn't exercise anymore because of this.        The patient's allergies and medications were reviewed as noted. A set of vitals were recorded as noted without incident. The patient does not have any other questions for the provider.    Ag Garza, EMT at 8:24 AM on 7/7/2021

## 2021-07-07 NOTE — PATIENT INSTRUCTIONS
To schedule your echocardiogram, please call cardiology at [phone]. Before the echocardiogram, please discontinue metoprolol the day before or the day of the stress test.

## 2021-07-07 NOTE — NURSING NOTE
Chief Complaints and History of Present Illnesses   Patient presents with     Diabetic Eye Exam     Chief Complaint(s) and History of Present Illness(es)     Diabetic Eye Exam     Vision: is blurred for distance and is worsening    Associated symptoms: blurred vision    Diabetes Type: Type 2    Duration: years    Blood Sugars: is controlled    Treatments tried: no treatments    Pain scale: 0/10              Comments     Pt states vision stable. Using cheaters for reading. No pain. Pt felt some vision decrease.      Lab Results       Component                Value               Date                       A1C                      10.4                03/24/2021                 A1C                      7.2                 01/03/2020                 A1C                      7.3                 09/04/2019                 A1C                      8.5                 05/23/2019                 A1C                      8.6                 04/10/2019              Padao Vinson, COT COT 9:45 AM July 7, 2021

## 2021-07-07 NOTE — PROGRESS NOTES
"    Assessment & Plan     DM type 2, goal HbA1c 7%-8% (H)  Due. Poor control. He plans to f/u endocrine MD  - Hemoglobin A1c; Future    Hemoptysis  Heavy smoking history. Sx only on exertion, unlikely PE.  - CT Chest w/o contrast; Future    ENGLISH (dyspnea on exertion)    - EKG Performed in Clinic w/ Provider Reading Fee  - Echo Stress Echocardiogram; Future  - N terminal pro BNP; Future    Exertional chest pain    - EKG Performed in Clinic w/ Provider Reading Fee  - Echo Stress Echocardiogram; Future  - CBC with platelets differential; Future  - Comprehensive metabolic panel; Future      52 minutes spent on the date of the encounter doing chart review, history and exam, documentation and further activities per the note    BMI:   Estimated body mass index is 31.86 kg/m  as calculated from the following:    Height as of this encounter: 1.7 m (5' 6.93\").    Weight as of this encounter: 92.1 kg (203 lb).     Jaswinder Anne MD  [facility] is a 66 year old who presents for the following health issues  accompanied by his :    HPI Here in f/u. Lately notes new issue. Not at rest. Notes w/ exertion more than walk, eg if tries to jog even short distance, gets anterior chest pain, short of breath, and might cough up a little blood. Used to smoke. Has DM. No known heart disease. No documented lung disease. No blood clot history, not pleuristy and only coughs w/ exercise level exertion. No fever. Hopes to go to [place] this fall wants all health issues checked out first.  Some leg edema. Mild.   No GI symptoms on ROS.    DM: not good control. Due for lab. Miised DM Ed visit wants help setting back up.    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/28/2018     BPH (benign prostatic hyperplasia)      Cancer (H)     hepatocellualr carcinoma     Cirrhosis of liver (H) 05/08/2018     Diabetes (H)  " "    Enlarged prostate      Hypothyroidism      Impotence of organic origin 2020     Inguinal hernia     Repaired with mesh on 18     Liver lesion 2018     Liver transplanted (H) 2018     donor liver transplant     Portal vein thrombosis     on path explant     Postoperative atrial fibrillation (H) 2018     Prostate cancer (H)      TB lung, latent     Treated      Past Surgical History:   Procedure Laterality Date     APPENDECTOMY       COLONOSCOPY           DAVINCI PROSTATECTOMY N/A 1/3/2020    Procedure: Robot-assisted laparoscopic radical prostatectomy, Bladder biopsy;  Surgeon: Kenrick Casiano MD;  Location: [facility]     ENDOSCOPIC RETROGRADE CHOLANGIOPANCREATOGRAM N/A 2018    Procedure: ENDOSCOPIC RETROGRADE CHOLANGIOPANCREATOGRAM, with Billary Sphincterotomy and Billary Stent Placement;  Surgeon: Omero Lawler MD;  Location: [facility]     ENDOSCOPIC RETROGRADE CHOLANGIOPANCREATOGRAM  2019    Procedure: COMBINED ENDOSCOPIC RETROGRADE CHOLANGIOPANCREATOGRAPHY, Bile Duct Stent Exchange;  Surgeon: Omero Lawler MD;  [facility]     ENDOSCOPIC RETROGRADE CHOLANGIOPANCREATOGRAM N/A 2019    Procedure: ENDOSCOPIC RETROGRADE CHOLANGIOPANCREATOGRAPHY, WITH BILIARY STENT REMOVAL AND STONE EXTRACTION;  Surgeon: Omero Lawler MD;  Location: [facility] OR     HERNIORRHAPHY INGUINAL  2018    Procedure: Herniorrhaphy inguinal;  Surgeon: Emil Echevarria MD;  Location: [facility]     IR LIVER BIOPSY PERCUTANEOUS  2018     LAPAROTOMY EXPLORATORY      per the patient \"for infection in the LLQ\"      TRANSPLANT LIVER RECIPIENT  DONOR N/A 2018    Procedure: TRANSPLANT LIVER RECIPIENT  DONOR;  Surgeon: Emil Echevarria MD;  Location: [facility]     Current Outpatient Medications   Medication     alfuzosin ER (UROXATRAL) 10 MG 24 hr tablet     amLODIPine (NORVASC) 10 MG tablet     aspirin (ASA) 325 MG EC tablet     " bisacodyl (DULCOLAX) 10 MG suppository     blood glucose (NO BRAND SPECIFIED) lancets standard     blood glucose (ONETOUCH VERIO IQ) test strip     blood glucose monitoring (NO BRAND SPECIFIED) meter device kit     glucose 40 % (400 mg/mL) gel     insulin glargine (LANTUS SOLOSTAR) 100 UNIT/ML pen     insulin pen needle (BD KIRK U/F) 32G X 4 MM miscellaneous     Lancets (ONETOUCH DELICA PLUS [id number]) MISC     levothyroxine (SYNTHROID/LEVOTHROID) 88 MCG tablet     losartan (COZAAR) 50 MG tablet     magnesium hydroxide (MILK OF MAGNESIA) 400 MG/5ML suspension     metFORMIN (GLUCOPHAGE-XR) 500 MG 24 hr tablet     Metoprolol Tartrate 75 MG TABS     mineral oil liquid     Multiple Vitamin (DAILY-STEVE) TABS     polyethylene glycol (MIRALAX) 17 GM/Dose powder     Sharps Container MISC     sildenafil (VIAGRA) 100 MG tablet     sitagliptin (JANUVIA) 25 MG tablet     tacrolimus (GENERIC EQUIVALENT) 1 MG capsule     ursodiol (ACTIGALL) 250 MG tablet     No current facility-administered medications for this visit.      No Known Allergies  Social History     Socioeconomic History     Marital status:      Spouse name: Not on file     Number of children: Not on file     Years of education: Not on file     Highest education level: Not on file   Occupational History     Not on file   Social Needs     Financial resource strain: Not on file     Food insecurity     Worry: Not on file     Inability: Not on file     Transportation needs     Medical: Not on file     Non-medical: Not on file   Tobacco Use     Smoking status: Former Smoker     Packs/day: 0.03     Years: 20.00     Pack years: 0.60     Types: Cigarettes, Cigars     Start date: 8/14/1970     Quit date: 3/14/2018     Years since quitting: 3.3     Smokeless tobacco: Never Used     Tobacco comment: Quit smoking Feb 2018, one cigarette after dinner   Substance and Sexual Activity     Alcohol use: No     Frequency: Never     Comment: Quit drinking Feb 2018     Drug use: No  "    Sexual activity: Not on file   Lifestyle     Physical activity     Days per week: Not on file     Minutes per session: Not on file     Stress: Not on file   Relationships     Social connections     Talks on phone: Not on file     Gets together: Not on file     Attends Protestant service: Not on file     Active member of club or organization: Not on file     Attends meetings of clubs or organizations: Not on file     Relationship status: Not on file     Intimate partner violence     Fear of current or ex partner: Not on file     Emotionally abused: Not on file     Physically abused: Not on file     Forced sexual activity: Not on file   Other Topics Concern     Parent/sibling w/ CABG, MI or angioplasty before 65F 55M? Not Asked   Social History Narrative    Born in [place], came to [place] years ago.     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cardboard, trimming meats               Review of Systems         Objective    /80 (BP Location: Right arm, Patient Position: Sitting, Cuff Size: Adult Regular)   Pulse 65   Resp 16   Ht 1.7 m (5' 6.93\")   Wt 92.1 kg (203 lb)   SpO2 97%   BMI 31.86 kg/m    Body mass index is 31.86 kg/m .  Physical Exam   GENERAL: healthy, alert and no distress  NECK: no adenopathy, no asymmetry, masses, or scars and thyroid normal to palpation  RESP: lungs clear to auscultation - no rales, rhonchi or wheezes  CV: regular rate and rhythm, normal S1 S2, no S3 or S4, no murmur, click or rub, no peripheral edema and peripheral pulses strong  ABDOMEN: soft, nontender, no hepatosplenomegaly, no masses and bowel sounds normal  MS: no gross musculoskeletal defects noted, trace edema both legs              "

## 2021-07-08 ENCOUNTER — PRE VISIT (OUTPATIENT)
Dept: UROLOGY | Facility: CLINIC | Age: 66
End: 2021-07-08

## 2021-07-08 NOTE — TELEPHONE ENCOUNTER
Reason for visit: Cystoscopy     Relevant information: Prostate cancer, hematuria    Records/imaging/labs/orders: PSA, in epic    Pt called: No    At Rooming: urine sample, cysto

## 2021-07-09 ENCOUNTER — OFFICE VISIT (OUTPATIENT)
Dept: UROLOGY | Facility: CLINIC | Age: 66
End: 2021-07-09
Payer: COMMERCIAL

## 2021-07-09 VITALS
HEART RATE: 65 BPM | WEIGHT: 203 LBS | SYSTOLIC BLOOD PRESSURE: 129 MMHG | DIASTOLIC BLOOD PRESSURE: 80 MMHG | HEIGHT: 66 IN | BODY MASS INDEX: 32.62 KG/M2

## 2021-07-09 DIAGNOSIS — R97.20 ELEVATED PROSTATE SPECIFIC ANTIGEN (PSA): ICD-10-CM

## 2021-07-09 DIAGNOSIS — N39.3 STRESS INCONTINENCE OF URINE: ICD-10-CM

## 2021-07-09 DIAGNOSIS — R30.0 DYSURIA: ICD-10-CM

## 2021-07-09 DIAGNOSIS — C61 PROSTATE CANCER (H): ICD-10-CM

## 2021-07-09 DIAGNOSIS — R31.29 MICROSCOPIC HEMATURIA: Primary | ICD-10-CM

## 2021-07-09 DIAGNOSIS — R35.1 NOCTURIA: ICD-10-CM

## 2021-07-09 PROCEDURE — 52000 CYSTOURETHROSCOPY: CPT | Performed by: STUDENT IN AN ORGANIZED HEALTH CARE EDUCATION/TRAINING PROGRAM

## 2021-07-09 PROCEDURE — 88112 CYTOPATH CELL ENHANCE TECH: CPT | Mod: 26 | Performed by: PATHOLOGY

## 2021-07-09 RX ORDER — LIDOCAINE HYDROCHLORIDE 20 MG/ML
JELLY TOPICAL ONCE
Status: COMPLETED | OUTPATIENT
Start: 2021-07-09 | End: 2021-07-09

## 2021-07-09 RX ADMIN — LIDOCAINE HYDROCHLORIDE: 20 JELLY TOPICAL at 16:23

## 2021-07-09 ASSESSMENT — MIFFLIN-ST. JEOR: SCORE: 1633.55

## 2021-07-09 ASSESSMENT — PAIN SCALES - GENERAL: PAINLEVEL: NO PAIN (0)

## 2021-07-09 NOTE — LETTER
7/9/2021       RE: Loy Limon  [address]  [facility] [address]     Dear Colleague,    Thank you for referring your patient, Loy Limon, to the [facility] [place] [facility]. Please see a copy of my visit note below.    Pre-procedure diagnosis: Microscopic hematuria  Post procedure diagnosis: normal cystoscopy  Procedure performed: cystoscopy  Surgeon: Marcos Leal MD  Anesthesia: local    Indications for procedure: Patient is a 66 year old year old male with a history of prostate cancer post prostatectomy now presenting with hematuria.  Here today for cysto    Description of procedure: After fully informed voluntary consent was obtained patient was brought into the procedure room, identified and placed in a supine position on the cysto table.  The groin/scrotum were prepped and draped in a sterile fashion with betadine.  A 15F flexible cystoscope was inserted into the urethra and the bladder and urethra examined in a systematic manner.  There were no tumor stones or diverticula.  Ureteric orifices were normal in position and number and effluxing clear urine.  Prostate was absent.  Distal urethra was normal.  The patient tolerated the procedure well and there were no complications.      Assessment/Plan: Patient with a history of hematuria/bladder cancer with negative cystoscopy.  Follow up as needed.      Marcos Leal MD

## 2021-07-09 NOTE — PATIENT INSTRUCTIONS
"Please follow-up with Kerri as scheduled on August 31st and have a PSA blood test on August 25th at     AFTER YOUR CYSTOSCOPY        You have just completed a cystoscopy, or \"cysto\", which allowed your physician to learn more about your bladder (or to remove a stent placed after surgery). We suggest that you continue to avoid caffeine, fruit juice, and alcohol for the next 24 hours, however, you are encouraged to return to your normal activities.         A few things that are considered normal after your cystoscopy:     * Small amount of bleeding (or spotting) that clears within the next 24 hours     * Slight burning sensation with urination     * Sensation to of needing to avoid more frequently     * The feeling of \"air\" in your urine     * Mild discomfort that is relieved with Tylenol        Please contact our office promptly if you:     * Develop a fever above 101 degrees     * Are unable to urinate     * Develop bright red blood that does not stop     * Severe pain or swelling         Please contact our office with any concerns or questions @[phone]    AFTER YOUR CYSTOSCOPY        You have just completed a cystoscopy, or \"cysto\", which allowed your physician to learn more about your bladder (or to remove a stent placed after surgery). We suggest that you continue to avoid caffeine, fruit juice, and alcohol for the next 24 hours, however, you are encouraged to return to your normal activities.         A few things that are considered normal after your cystoscopy:     * Small amount of bleeding (or spotting) that clears within the next 24 hours     * Slight burning sensation with urination     * Sensation to of needing to avoid more frequently     * The feeling of \"air\" in your urine     * Mild discomfort that is relieved with Tylenol        Please contact our office promptly if you:     * Develop a fever above 101 degrees     * Are unable to urinate     * Develop bright red blood that does not stop     * " Severe pain or swelling         Please contact our office with any concerns or questions @[facility].

## 2021-07-09 NOTE — NURSING NOTE
"Chief Complaint   Patient presents with     Cystoscopy     history of microscopic blood in urine       Blood pressure 129/80, pulse 65, height 1.676 m (5' 6\"), weight 92.1 kg (203 lb). Body mass index is 32.77 kg/m .    Patient Active Problem List   Diagnosis     Cirrhosis of liver (H)     Liver lesion     HCC (hepatocellular carcinoma) (H)     Liver transplant recipient (H)     Immunosuppressed status (H)     Hypervolemia     Atrial fibrillation with rapid ventricular response (H)     Hematuria     Bilateral wheezing     Hypoxia     Hyperglycemia     Pre-diabetes     Essential hypertension     Constipation     Biliary stricture of transplanted liver (H)     Drug-induced diabetes mellitus (H)     Muscle tension dysphonia     Laennec's cirrhosis (H)     Elevated ferritin     Inguinal hernia of right side with obstruction and without gangrene     Portal hypertension (H)     Right sided abdominal pain     Tobacco use disorder     Prostate cancer (H)     Diabetes mellitus, type 2 (H)     Persistent proteinuria       No Known Allergies    Current Outpatient Medications   Medication Sig Dispense Refill     alfuzosin ER (UROXATRAL) 10 MG 24 hr tablet        amLODIPine (NORVASC) 10 MG tablet Take 1 tablet (10 mg) by mouth daily 90 tablet 3     aspirin (ASA) 325 MG EC tablet Take 1 tablet (325 mg) by mouth daily 90 tablet 3     bisacodyl (DULCOLAX) 10 MG suppository Place 1 suppository (10 mg) rectally daily For constipation. Stop if diarrhea occurs. 10 suppository 1     blood glucose (NO BRAND SPECIFIED) lancets standard Use to test blood sugar 4 times daily or as directed. 400 each 2     blood glucose (ONETOUCH VERIO IQ) test strip Use to test blood sugar 2 times daily or as directed. 200 strip 11     blood glucose monitoring (NO BRAND SPECIFIED) meter device kit Use to test blood sugar 2 times daily or as directed. Please let the pt know when it is ready to . 1 kit 0     glucose 40 % (400 mg/mL) gel Take 15-30 g by " mouth every 15 minutes as needed for low blood sugar 30 g 3     insulin glargine (LANTUS SOLOSTAR) 100 UNIT/ML pen 12 Units Inject 14 units subcutaneous daily. 15 mL 1     insulin pen needle (BD KIRK U/F) 32G X 4 MM miscellaneous Use 1 daily. 200 each 3     Lancets (ONETOUCH DELICA PLUS [id number]) MISC U TO TEST QID OR UTD       levothyroxine (SYNTHROID/LEVOTHROID) 88 MCG tablet Take 1 tablet (88 mcg) by mouth daily 90 tablet 3     losartan (COZAAR) 50 MG tablet Take 1 tablet (50 mg) by mouth daily 30 tablet 11     magnesium hydroxide (MILK OF MAGNESIA) 400 MG/5ML suspension Take 30 mLs by mouth daily as needed for constipation or heartburn (Stop if diarrhea occurs) 355 mL 1     metFORMIN (GLUCOPHAGE-XR) 500 MG 24 hr tablet Take 2 tablets (1,000 mg) by mouth 2 times daily (with meals) 360 tablet 1     Metoprolol Tartrate 75 MG TABS Take 75 mg by mouth 2 times daily 180 tablet 0     mineral oil liquid Take 30 mLs by mouth daily For constipation. Stop if diarrhea occurs. 500 mL 1     Multiple Vitamin (DAILY-STEVE) TABS Take 1 tablet by mouth daily 100 tablet 3     polyethylene glycol (MIRALAX) 17 GM/Dose powder Take 17 g (1 capful) by mouth daily 507 g 3     Sharps Container MISC 1 each daily 1 each 2     sildenafil (VIAGRA) 100 MG tablet Take 1/2 to 1 full pill by mouth 1/2 hour before sexual activity 10 tablet 3     sitagliptin (JANUVIA) 25 MG tablet Take 1 tablet (25 mg) by mouth daily 90 tablet 1     tacrolimus (GENERIC EQUIVALENT) 1 MG capsule Take 2 capsules (2 mg) by mouth every 12 hours 120 capsule 11     ursodiol (ACTIGALL) 250 MG tablet Take 1 tablet (250 mg) by mouth 2 times daily 180 tablet 3       Social History     Tobacco Use     Smoking status: Former Smoker     Packs/day: 0.03     Years: 20.00     Pack years: 0.60     Types: Cigarettes, Cigars     Start date: 8/14/1970     Quit date: 3/14/2018     Years since quitting: 3.3     Smokeless tobacco: Never Used     Tobacco comment: Quit smoking Feb 2018,  one cigarette after dinner   Substance Use Topics     Alcohol use: No     Frequency: Never     Comment: Quit drinking 2018     Drug use: No       Invasive Procedure Safety Checklist:    Procedure: Cystoscopy    Action: Complete sections and checkboxes as appropriate.    Pre-procedure:  1. Patient ID Verified with 2 identifiers (Monique and  or MRN) : YES    2. Procedure and site verified with patient/designee (when able) : YES    3. Accurate consent documentation in medical record : YES    4. H&P (or appropriate assessment) documented in medical record : N/A  H&P must be up to 30 days prior to procedure an updated within 24 hours of                 Procedure as applicable.     5. Relevant diagnostic and radiology test results appropriately labeled and displayed as applicable : YES    6. Blood products, implants, devices, and/or special equipment available for the procedure as applicable : YES    7. Procedure site(s) marked with provider initials [Exclusions: none] : NO    8. Marking not required. Reason : Yes  Procedure does not require site marking    Time Out:     Time-Out performed immediately prior to starting procedure, including verbal and active participation of all team members addressing: YES    1. Correct patient identity.  2. Confirmed that the correct side and site are marked.  3. An accurate procedure to be done.  4. Agreement on the procedure to be done.  5. Correct patient position.  6. Relevant images and results are properly labeled and appropriately displayed.  7. The need to administer antibiotics or fluids for irrigation purposes during the procedure as applicable.  8. Safety precautions based on patient history or medication use.    During Procedure: Verification of correct person, site, and procedure occurs any time the responsibility for care of the patient is transferred to another member of the care team.    The following medication was given:     MEDICATION:  Lidocaine without  epinephrine 2% jelly  ROUTE: urethral   SITE: urethral   DOSE: 10 mL  LOT #: [id number]  : [employer]  EXPIRATION DATE: 1-23  NDC#: [address]-2   Was there drug waste? No    Prior to med admin, verified patient identity using patient's name and date of birth.  Due to med administration, patient instructed to remain in clinic for 15 minutes  afterwards, and to report any adverse reaction to me immediately.    Drug Amount Wasted:  None.  Vial/Syringe: LINCOLN Saini, EMT  7/9/2021  3:51 PM

## 2021-07-12 NOTE — PROGRESS NOTES
Pre-procedure diagnosis: Microscopic hematuria  Post procedure diagnosis: normal cystoscopy  Procedure performed: cystoscopy  Surgeon: Marcos Leal MD  Anesthesia: local    Indications for procedure: Patient is a 66 year old year old male with a history of prostate cancer post prostatectomy now presenting with hematuria.  Here today for cysto    Description of procedure: After fully informed voluntary consent was obtained patient was brought into the procedure room, identified and placed in a supine position on the cysto table.  The groin/scrotum were prepped and draped in a sterile fashion with betadine.  A 15F flexible cystoscope was inserted into the urethra and the bladder and urethra examined in a systematic manner.  There were no tumor stones or diverticula.  Ureteric orifices were normal in position and number and effluxing clear urine.  Prostate was absent.  Distal urethra was normal.  The patient tolerated the procedure well and there were no complications.      Assessment/Plan: Patient with a history of hematuria/bladder cancer with negative cystoscopy.  Follow up as needed.

## 2021-07-14 ENCOUNTER — TELEPHONE (OUTPATIENT)
Dept: EDUCATION SERVICES | Facility: CLINIC | Age: 66
End: 2021-07-14

## 2021-07-14 ENCOUNTER — HOSPITAL ENCOUNTER (OUTPATIENT)
Dept: CARDIOLOGY | Facility: CLINIC | Age: 66
Discharge: HOME | End: 2021-07-14
Attending: FAMILY MEDICINE | Admitting: FAMILY MEDICINE
Payer: COMMERCIAL

## 2021-07-14 ENCOUNTER — ANCILLARY PROCEDURE (OUTPATIENT)
Dept: CT IMAGING | Facility: CLINIC | Age: 66
End: 2021-07-14
Attending: FAMILY MEDICINE
Payer: COMMERCIAL

## 2021-07-14 DIAGNOSIS — R04.2 HEMOPTYSIS: ICD-10-CM

## 2021-07-14 DIAGNOSIS — R07.9 EXERTIONAL CHEST PAIN: ICD-10-CM

## 2021-07-14 DIAGNOSIS — R06.09 DOE (DYSPNEA ON EXERTION): ICD-10-CM

## 2021-07-14 LAB — COPATH REPORT: NORMAL

## 2021-07-14 PROCEDURE — 93018 CV STRESS TEST I&R ONLY: CPT | Performed by: INTERNAL MEDICINE

## 2021-07-14 PROCEDURE — 93325 DOPPLER ECHO COLOR FLOW MAPG: CPT | Mod: 26 | Performed by: INTERNAL MEDICINE

## 2021-07-14 PROCEDURE — 93016 CV STRESS TEST SUPVJ ONLY: CPT | Performed by: INTERNAL MEDICINE

## 2021-07-14 PROCEDURE — 93325 DOPPLER ECHO COLOR FLOW MAPG: CPT | Mod: TC

## 2021-07-14 PROCEDURE — 71250 CT THORAX DX C-: CPT | Mod: GC | Performed by: RADIOLOGY

## 2021-07-14 PROCEDURE — 255N000002 HC RX 255 OP 636: Performed by: INTERNAL MEDICINE

## 2021-07-14 PROCEDURE — 93321 DOPPLER ECHO F-UP/LMTD STD: CPT | Mod: 26 | Performed by: INTERNAL MEDICINE

## 2021-07-14 PROCEDURE — 93350 STRESS TTE ONLY: CPT | Mod: 26 | Performed by: INTERNAL MEDICINE

## 2021-07-14 RX ADMIN — HUMAN ALBUMIN MICROSPHERES AND PERFLUTREN 5 ML: 10; .22 INJECTION, SOLUTION INTRAVENOUS at 08:33

## 2021-07-14 NOTE — TELEPHONE ENCOUNTER
Pt has missed 3+ return visits with diabetic education/Marcy Wolf in the past year. Per clinic guidelines, patient must speak to clinic manager prior to scheduling. Visit type restrictions in place.

## 2021-07-15 ENCOUNTER — TELEPHONE (OUTPATIENT)
Dept: INTERNAL MEDICINE | Facility: CLINIC | Age: 66
End: 2021-07-15

## 2021-07-15 ENCOUNTER — APPOINTMENT (OUTPATIENT)
Dept: INTERPRETER SERVICES | Facility: CLINIC | Age: 66
End: 2021-07-15
Payer: COMMERCIAL

## 2021-07-15 DIAGNOSIS — R06.09 DOE (DYSPNEA ON EXERTION): Primary | ICD-10-CM

## 2021-07-15 NOTE — TELEPHONE ENCOUNTER
Pt was informed the results and recommendations via . He will be called after scheduling with cardiology.    Soon-Mi  ----------------------------------------------------------          ----- Message from Jaswinder Anne MD sent at 7/14/2021 12:16 PM CDT -----  Can you call him. Needs . The chest ct looked ok. The stress test they had to stop a bit early. He is short of breath. Let's have him see cardiology soon re: dyspnea on exertion.

## 2021-07-20 DIAGNOSIS — I10 ESSENTIAL HYPERTENSION: Primary | ICD-10-CM

## 2021-07-21 ENCOUNTER — ALLIED HEALTH/NURSE VISIT (OUTPATIENT)
Dept: EDUCATION SERVICES | Facility: CLINIC | Age: 66
End: 2021-07-21
Payer: COMMERCIAL

## 2021-07-21 ENCOUNTER — LAB (OUTPATIENT)
Dept: LAB | Facility: CLINIC | Age: 66
End: 2021-07-21
Payer: COMMERCIAL

## 2021-07-21 DIAGNOSIS — C61 PROSTATE CANCER (H): ICD-10-CM

## 2021-07-21 DIAGNOSIS — I10 ESSENTIAL HYPERTENSION: ICD-10-CM

## 2021-07-21 DIAGNOSIS — E03.8 OTHER SPECIFIED HYPOTHYROIDISM: ICD-10-CM

## 2021-07-21 DIAGNOSIS — E11.9 TYPE 2 DIABETES MELLITUS (H): Primary | ICD-10-CM

## 2021-07-21 LAB
ALBUMIN SERPL-MCNC: 3.3 G/DL (ref 3.4–5)
ALBUMIN UR-MCNC: >499 MG/DL
ANION GAP SERPL CALCULATED.3IONS-SCNC: 4 MMOL/L (ref 3–14)
APPEARANCE UR: CLEAR
BILIRUB UR QL STRIP: NEGATIVE
BUN SERPL-MCNC: 19 MG/DL (ref 7–30)
CALCIUM SERPL-MCNC: 8.5 MG/DL (ref 8.5–10.1)
CHLORIDE BLD-SCNC: 109 MMOL/L (ref 94–109)
CO2 SERPL-SCNC: 29 MMOL/L (ref 20–32)
COLOR UR AUTO: YELLOW
CREAT SERPL-MCNC: 0.98 MG/DL (ref 0.66–1.25)
CREAT UR-MCNC: 156 MG/DL
GFR SERPL CREATININE-BSD FRML MDRD: 79 ML/MIN/1.73M2
GLUCOSE BLD-MCNC: 202 MG/DL (ref 70–99)
GLUCOSE UR STRIP-MCNC: 50 MG/DL
HGB UR QL STRIP: NEGATIVE
HYALINE CASTS: 9 /LPF
KETONES UR STRIP-MCNC: NEGATIVE MG/DL
LEUKOCYTE ESTERASE UR QL STRIP: NEGATIVE
MUCOUS THREADS #/AREA URNS LPF: PRESENT /LPF
NITRATE UR QL: NEGATIVE
PH UR STRIP: 5 [PH] (ref 5–7)
PHOSPHATE SERPL-MCNC: 3 MG/DL (ref 2.5–4.5)
POTASSIUM BLD-SCNC: 5.1 MMOL/L (ref 3.4–5.3)
PROT UR-MCNC: 2.18 G/L
PROT/CREAT 24H UR: 1.4 G/G CR (ref 0–0.2)
PSA SERPL-MCNC: <0.01 UG/L (ref 0–4)
RBC URINE: 2 /HPF
SODIUM SERPL-SCNC: 142 MMOL/L (ref 133–144)
SP GR UR STRIP: 1.02 (ref 1–1.03)
SQUAMOUS EPITHELIAL: <1 /HPF
T4 FREE SERPL-MCNC: 0.88 NG/DL (ref 0.76–1.46)
TSH SERPL DL<=0.005 MIU/L-ACNC: 8.05 MU/L (ref 0.4–4)
UROBILINOGEN UR STRIP-MCNC: NORMAL MG/DL
WBC URINE: 1 /HPF

## 2021-07-21 PROCEDURE — 84439 ASSAY OF FREE THYROXINE: CPT | Performed by: PATHOLOGY

## 2021-07-21 PROCEDURE — 84156 ASSAY OF PROTEIN URINE: CPT | Performed by: PATHOLOGY

## 2021-07-21 PROCEDURE — 84153 ASSAY OF PSA TOTAL: CPT | Performed by: PATHOLOGY

## 2021-07-21 PROCEDURE — 81001 URINALYSIS AUTO W/SCOPE: CPT | Performed by: PATHOLOGY

## 2021-07-21 PROCEDURE — 84443 ASSAY THYROID STIM HORMONE: CPT | Performed by: PATHOLOGY

## 2021-07-21 PROCEDURE — 36415 COLL VENOUS BLD VENIPUNCTURE: CPT | Performed by: PATHOLOGY

## 2021-07-21 PROCEDURE — 80069 RENAL FUNCTION PANEL: CPT | Performed by: PATHOLOGY

## 2021-07-21 PROCEDURE — 85027 COMPLETE CBC AUTOMATED: CPT | Performed by: PATHOLOGY

## 2021-07-21 PROCEDURE — G0108 DIAB MANAGE TRN  PER INDIV: HCPCS

## 2021-07-21 NOTE — PROGRESS NOTES
DIABETES EDUCATION NOTE:    Loy Limon presents today for education related to Type 2 diabetes.    Patient is being treated with:  oral agents and insulin  He is accompanied by self    PATIENT CONCERNS RELATED TO DIABETES SELF MANAGEMENT:   Blood sugar too high    ASSESSMENT: type 2 diabetes, hyperglycemia    Current Diabetes Management per Patient:  Taking diabetes medications?   yes:     Diabetes Medication(s)     Biguanides       metFORMIN (GLUCOPHAGE-XR) 500 MG 24 hr tablet    Take 2 tablets (1,000 mg) by mouth 2 times daily (with meals)    Dipeptidyl Peptidase-4 (DPP-4) Inhibitors       sitagliptin (JANUVIA) 25 MG tablet    Take 1 tablet (25 mg) by mouth daily    Diabetic Other       glucose 40 % (400 mg/mL) gel    Take 15-30 g by mouth every 15 minutes as needed for low blood sugar    Insulin       insulin glargine (LANTUS PEN) 100 UNIT/ML pen    Take 10 units in the morning and 10 units in the evening     insulin glargine (LANTUS SOLOSTAR) 100 UNIT/ML pen    12 Units Inject 14 units subcutaneous daily.        Monitoring  Patient glucose self monitoring as follows:  3 times per day  BG results: average blood sugar is 226 mg/dl    BG values are: Not in goal  Patient's most recent   Lab Results   Component Value Date    A1C 9.1 07/07/2021    A1C 10.4 03/24/2021    A1C 7.2 01/03/2020    A1C 7.3 09/04/2019    A1C 8.5 05/23/2019     Activity: trying to be more active but has not been walking in the heat    Nutrition:   Trying to make better food choices and keep portions smaller    Hypoglycemia:   Patient is at risk of hypoglycemia? Yes    If yes, point at which symptoms are experienced: <100                  EDUCATION and INSTRUCTION PROVIDED AT THIS VISIT:  We discussed his current blood sugars.  He has dropped his average from 270 to 226.  He is spiking with meals and is resistant to meal time insulin.       He is willing to take Lantus twice a day.  Will try Lantus 10 units am and 10 units pm and place a  sensor to see if it helps.    The lot number is [id number] and SN [id number].  Exp date is 10/31/21.    Patient-stated goal written and given to Loy Limon.  Verbalized and demonstrated understanding of instructions.     PLAN:  See patient instructions  AVS printed and given to patient    FOLLOW-UP:    8/4/21 12:30  Time spent with patient at today's visit was 40 minutes.      Any diabetes medication dose changes were made via the [facility]DE Protocol and Collaborative Practice [facility].  A copy of this encounter was provided to patient's referring provider.

## 2021-07-22 ENCOUNTER — TELEPHONE (OUTPATIENT)
Dept: CARDIOLOGY | Facility: CLINIC | Age: 66
End: 2021-07-22

## 2021-07-22 LAB
ERYTHROCYTE [DISTWIDTH] IN BLOOD BY AUTOMATED COUNT: 12.5 % (ref 10–15)
HCT VFR BLD AUTO: 41.4 % (ref 40–53)
HGB BLD-MCNC: 13.7 G/DL (ref 13.3–17.7)
MCH RBC QN AUTO: 28.5 PG (ref 26.5–33)
MCHC RBC AUTO-ENTMCNC: 33.1 G/DL (ref 31.5–36.5)
MCV RBC AUTO: 86 FL (ref 78–100)
PLATELET # BLD AUTO: 235 10E3/UL (ref 150–450)
RBC # BLD AUTO: 4.81 10E6/UL (ref 4.4–5.9)
WBC # BLD AUTO: 6.9 10E3/UL (ref 4–11)

## 2021-07-23 ENCOUNTER — VIRTUAL VISIT (OUTPATIENT)
Dept: NEPHROLOGY | Facility: CLINIC | Age: 66
End: 2021-07-23
Attending: STUDENT IN AN ORGANIZED HEALTH CARE EDUCATION/TRAINING PROGRAM
Payer: COMMERCIAL

## 2021-07-23 DIAGNOSIS — R80.1 PERSISTENT PROTEINURIA: Primary | ICD-10-CM

## 2021-07-23 DIAGNOSIS — N18.2 CHRONIC KIDNEY DISEASE, STAGE 2 (MILD): ICD-10-CM

## 2021-07-23 DIAGNOSIS — Z94.4 LIVER TRANSPLANTED (H): Primary | ICD-10-CM

## 2021-07-23 DIAGNOSIS — I10 ESSENTIAL HYPERTENSION: ICD-10-CM

## 2021-07-23 DIAGNOSIS — Z94.4 LIVER TRANSPLANT RECIPIENT (H): Chronic | ICD-10-CM

## 2021-07-23 PROCEDURE — 99214 OFFICE O/P EST MOD 30 MIN: CPT | Mod: 95 | Performed by: STUDENT IN AN ORGANIZED HEALTH CARE EDUCATION/TRAINING PROGRAM

## 2021-07-23 NOTE — LETTER
7/23/2021       RE: Loy Limon  [address]  [facility] [address]     Dear Colleague,    Thank you for referring your patient, Loy Limon, to the [facility]PHROLOG[facility] [place] [facility]. Please see a copy of my visit note below.    Loy is a 66 year old who is being evaluated via a billable telephone visit.      What phone number would you like to be contacted at? [phone]  How would you like to obtain your AVS? Mail a copy  Phone call duration: 29 minutes     [phone]    Dean Eaton MD  Renal    Nephrology Clinic    Telephone Visit    SUBJECTIVE:   Loy Limon is a 66 year old year old male with pmh of EtOH cirrhosis and HCC s/p liver transplant 12/22/2018, prostate cancer, DM2 diagnosed post-transplant, and proteinuria here for: follow-up of his proteinuria    The patient returns after 3 months to renal clinic. His creatinine remains under 1 mg/dl, and his UPCR has improved somewhat to 1.4 g/g (from repeatedly 2.7-2.9 g/g in 2-4/2021). His prescribed losartan dose remains at 50 mg daily. BPs in 3/2021 were largely 150s/80s and now in 7/2021 was 129/80 most recently.     His proteinuria (albuminuria) first appears persistently in late 2019 (9/2019) based on UAs and ACR. His tx was in 12/2018.    The patient remains on CNI for immunosuppression, though notes document that his liver team has been considering taking him off CNI due to his proteinuria.     The patient denies new swelling issues. He has no specific complaints today. He does not that he recently had a cystoscopy that was negative, though he UAs have been repeatedly without blood (just protein).     Professional  was used by phone.     Review of systems:  4 point ROS negative except as noted above.    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/28/2018      BPH (benign prostatic hyperplasia)      Cancer (H)     hepatocellualr carcinoma     Cirrhosis of liver (H) 2018     Diabetes (H)      Enlarged prostate      Hypothyroidism      Impotence of organic origin 2020     Inguinal hernia     Repaired with mesh on 18     Liver lesion 2018     Liver transplanted (H) 2018     donor liver transplant     Portal vein thrombosis     on path explant     Postoperative atrial fibrillation (H) 2018     Prostate cancer (H)      TB lung, latent     Treated         alfuzosin ER (UROXATRAL) 10 MG 24 hr tablet,   amLODIPine (NORVASC) 10 MG tablet, Take 1 tablet (10 mg) by mouth daily  aspirin (ASA) 325 MG EC tablet, Take 1 tablet (325 mg) by mouth daily  bisacodyl (DULCOLAX) 10 MG suppository, Place 1 suppository (10 mg) rectally daily For constipation. Stop if diarrhea occurs.  blood glucose (NO BRAND SPECIFIED) lancets standard, Use to test blood sugar 4 times daily or as directed.  blood glucose (ONETOUCH VERIO IQ) test strip, Use to test blood sugar 2 times daily or as directed.  blood glucose monitoring (NO BRAND SPECIFIED) meter device kit, Use to test blood sugar 2 times daily or as directed. Please let the pt know when it is ready to .  glucose 40 % (400 mg/mL) gel, Take 15-30 g by mouth every 15 minutes as needed for low blood sugar  insulin glargine (LANTUS PEN) 100 UNIT/ML pen, Take 10 units in the morning and 10 units in the evening  insulin glargine (LANTUS SOLOSTAR) 100 UNIT/ML pen, 12 Units Inject 14 units subcutaneous daily.  insulin pen needle (BD KIRK U/F) 32G X 4 MM miscellaneous, Use 1 daily.  Lancets (ONETOUCH DELICA PLUS [id number]) MISC, U TO TEST QID OR UTD  levothyroxine (SYNTHROID/LEVOTHROID) 88 MCG tablet, Take 1 tablet (88 mcg) by mouth daily  losartan (COZAAR) 50 MG tablet, Take 1 tablet (50 mg) by mouth daily  magnesium hydroxide (MILK OF MAGNESIA) 400 MG/5ML suspension, Take 30 mLs by mouth daily as needed for  constipation or heartburn (Stop if diarrhea occurs)  metFORMIN (GLUCOPHAGE-XR) 500 MG 24 hr tablet, Take 2 tablets (1,000 mg) by mouth 2 times daily (with meals)  Metoprolol Tartrate 75 MG TABS, Take 75 mg by mouth 2 times daily  mineral oil liquid, Take 30 mLs by mouth daily For constipation. Stop if diarrhea occurs.  Multiple Vitamin (DAILY-STEVE) TABS, Take 1 tablet by mouth daily  polyethylene glycol (MIRALAX) 17 GM/Dose powder, Take 17 g (1 capful) by mouth daily  Sharps Container MISC, 1 each daily  sildenafil (VIAGRA) 100 MG tablet, Take 1/2 to 1 full pill by mouth 1/2 hour before sexual activity  sitagliptin (JANUVIA) 25 MG tablet, Take 1 tablet (25 mg) by mouth daily  tacrolimus (GENERIC EQUIVALENT) 1 MG capsule, Take 2 capsules (2 mg) by mouth every 12 hours  ursodiol (ACTIGALL) 250 MG tablet, Take 1 tablet (250 mg) by mouth 2 times daily    No current facility-administered medications on file prior to visit.       OBJECTIVE:  Vital signs and Physical exam deferred due to telephone visit.    Most recent renal panel, random urine protein, UA with micro, CBC reviewed.     Recent Labs   Lab Test 07/21/21  1804 07/07/21  1059    138   POTASSIUM 5.1 4.7   CHLORIDE 109 106   CO2 29 25   ANIONGAP 4 6   * 202*   BUN 19 26   CR 0.98 0.79   YELENA 8.5 8.3*       Lab Results   Component Value Date    WBC 6.9 07/21/2021    WBC 7.0 07/07/2021     Lab Results   Component Value Date    RBC 4.81 07/21/2021    RBC 4.83 07/07/2021     Lab Results   Component Value Date    HGB 13.7 07/21/2021    HGB 14.0 07/07/2021     Lab Results   Component Value Date    HCT 41.4 07/21/2021    HCT 40.1 07/07/2021     No components found for: MCT  Lab Results   Component Value Date    MCV 86 07/21/2021    MCV 83 07/07/2021     Lab Results   Component Value Date    MCH 28.5 07/21/2021    MCH 29.0 07/07/2021     Lab Results   Component Value Date    MCHC 33.1 07/21/2021    MCHC 34.9 07/07/2021     Lab Results   Component Value Date     RDW 12.5 07/21/2021    RDW 12.2 07/07/2021     Lab Results   Component Value Date     07/21/2021     07/07/2021       Color Urine (no units)   Date Value   07/21/2021 Yellow   06/09/2021 Yellow     Appearance Urine (no units)   Date Value   07/21/2021 Clear   06/09/2021 Clear     Glucose Urine (mg/dL)   Date Value   07/21/2021 50  (A)   06/09/2021 150 (A)     Bilirubin Urine (no units)   Date Value   07/21/2021 Negative   06/09/2021 Negative     Ketones Urine (mg/dL)   Date Value   07/21/2021 Negative   06/09/2021 Negative     Specific Gravity Urine (no units)   Date Value   07/21/2021 1.018   06/09/2021 1.018     pH Urine   Date Value   07/21/2021 5.0   06/09/2021 5.0 pH     Protein Albumin Urine (mg/dL)   Date Value   07/21/2021 >499 (A)   06/09/2021 100 (A)     Nitrite Urine (no units)   Date Value   07/21/2021 Negative   06/09/2021 Negative     Leukocyte Esterase Urine (no units)   Date Value   07/21/2021 Negative   06/09/2021 Negative           ASSESSMENT and PLAN:   Loy was seen today for telephone.    Diagnoses and all orders for this visit:    Chronic kidney disease, stage 2 (mild)  Persistent proteinuria  The patient began having albuminuria about 9 months post-transplant, which, if causing FSGS-changes, would be relatively quickly. SPEP/immunofix/SFLC previously WNL. His DM is a post-tx diagnosis (Tx was 12/2018). The patient and I discussed the uncertainty in his kidney diagnosis, though with CNI-effects remaining at the top of the DDX. He and I discussed a kidney biopsy, of which the patient appeared very hesitant to pursue. He noted feeling somewhat traumatized by his recent (negative) cystoscopy. While only having a moderate pre-test probability, we will test the patient for serum PLA2R antibodies for possible membranous nephropathy (which might negate the need for Bx if positive), and perhaps even more importantly, I've discussed the case with the liver Tx team, and they plan an  empiric transition off of CNI to see if this improves (or at least results in stability in) the patients proteinuria with only modest loss of GFR.   -     [facility]; PLA2R quantitative antibody (Laboratory Miscellaneous Order); Future    Liver transplant recipient (H)  I have discussed my CNI concerns with the liver tx team, and they have informed me they intend to begin titrating the patient off CNI and will begin Cellcept and prednisone. Will see if this leads to an improvement in proteinuria (beyond that seen on this most recent measurement, which may be due to improved BP control).     Essential hypertension  Normotensive on recent documented BP. No change to losartan 50mg daily (with high-normal serum K, see below), amlodipine 10mg daily, or metoprolol 75mg bid.     Electrolytes, acid-base  High-normal serum potassium, likely due to ARB and CNI-effects. CTM with usual post-transplant monitoring labs. Patient not on Bactrim.       Return to clinic in 3 months for in-person visit.    Ryan Eaton MD  Nephrology  Pager: [phone]        Again, thank you for allowing me to participate in the care of your patient.      Sincerely,    Ryan Eaton MD

## 2021-07-23 NOTE — PROGRESS NOTES
Nephrology Clinic    Telephone Visit    SUBJECTIVE:   Loy Limon is a 66 year old year old male with pmh of EtOH cirrhosis and HCC s/p liver transplant 2018, prostate cancer, DM2 diagnosed post-transplant, and proteinuria here for: follow-up of his proteinuria    The patient returns after 3 months to renal clinic. His creatinine remains under 1 mg/dl, and his UPCR has improved somewhat to 1.4 g/g (from repeatedly 2.7-2.9 g/g in -2021). His prescribed losartan dose remains at 50 mg daily. BPs in 3/2021 were largely 150s/80s and now in 2021 was 129/80 most recently.     His proteinuria (albuminuria) first appears persistently in late 2019 (2019) based on UAs and ACR. His tx was in 2018.    The patient remains on CNI for immunosuppression, though notes document that his liver team has been considering taking him off CNI due to his proteinuria.     The patient denies new swelling issues. He has no specific complaints today. He does not that he recently had a cystoscopy that was negative, though he UAs have been repeatedly without blood (just protein).     Professional  was used by phone.     Review of systems:  4 point ROS negative except as noted above.    Past Medical History:   Diagnosis Date     Anemia      Biliary stricture of transplanted liver (H) 2018     BPH (benign prostatic hyperplasia)      Cancer (H)     hepatocellualr carcinoma     Cirrhosis of liver (H) 2018     Diabetes (H)      Enlarged prostate      Hypothyroidism      Impotence of organic origin 2020     Inguinal hernia     Repaired with mesh on 18     Liver lesion 2018     Liver transplanted (H) 2018     donor liver transplant     Portal vein thrombosis     on path explant     Postoperative atrial fibrillation (H) 2018     Prostate cancer (H)      TB lung, latent     Treated         alfuzosin ER (UROXATRAL) 10 MG 24 hr tablet,   amLODIPine (NORVASC) 10 MG  tablet, Take 1 tablet (10 mg) by mouth daily  aspirin (ASA) 325 MG EC tablet, Take 1 tablet (325 mg) by mouth daily  bisacodyl (DULCOLAX) 10 MG suppository, Place 1 suppository (10 mg) rectally daily For constipation. Stop if diarrhea occurs.  blood glucose (NO BRAND SPECIFIED) lancets standard, Use to test blood sugar 4 times daily or as directed.  blood glucose (ONETOUCH VERIO IQ) test strip, Use to test blood sugar 2 times daily or as directed.  blood glucose monitoring (NO BRAND SPECIFIED) meter device kit, Use to test blood sugar 2 times daily or as directed. Please let the pt know when it is ready to .  glucose 40 % (400 mg/mL) gel, Take 15-30 g by mouth every 15 minutes as needed for low blood sugar  insulin glargine (LANTUS PEN) 100 UNIT/ML pen, Take 10 units in the morning and 10 units in the evening  insulin glargine (LANTUS SOLOSTAR) 100 UNIT/ML pen, 12 Units Inject 14 units subcutaneous daily.  insulin pen needle (BD KIRK U/F) 32G X 4 MM miscellaneous, Use 1 daily.  Lancets (ONETOUCH DELICA PLUS [id number]) MISC, U TO TEST QID OR UTD  levothyroxine (SYNTHROID/LEVOTHROID) 88 MCG tablet, Take 1 tablet (88 mcg) by mouth daily  losartan (COZAAR) 50 MG tablet, Take 1 tablet (50 mg) by mouth daily  magnesium hydroxide (MILK OF MAGNESIA) 400 MG/5ML suspension, Take 30 mLs by mouth daily as needed for constipation or heartburn (Stop if diarrhea occurs)  metFORMIN (GLUCOPHAGE-XR) 500 MG 24 hr tablet, Take 2 tablets (1,000 mg) by mouth 2 times daily (with meals)  Metoprolol Tartrate 75 MG TABS, Take 75 mg by mouth 2 times daily  mineral oil liquid, Take 30 mLs by mouth daily For constipation. Stop if diarrhea occurs.  Multiple Vitamin (DAILY-STEVE) TABS, Take 1 tablet by mouth daily  polyethylene glycol (MIRALAX) 17 GM/Dose powder, Take 17 g (1 capful) by mouth daily  Sharps Container MISC, 1 each daily  sildenafil (VIAGRA) 100 MG tablet, Take 1/2 to 1 full pill by mouth 1/2 hour before sexual  activity  sitagliptin (JANUVIA) 25 MG tablet, Take 1 tablet (25 mg) by mouth daily  tacrolimus (GENERIC EQUIVALENT) 1 MG capsule, Take 2 capsules (2 mg) by mouth every 12 hours  ursodiol (ACTIGALL) 250 MG tablet, Take 1 tablet (250 mg) by mouth 2 times daily    No current facility-administered medications on file prior to visit.       OBJECTIVE:  Vital signs and Physical exam deferred due to telephone visit.    Most recent renal panel, random urine protein, UA with micro, CBC reviewed.     Recent Labs   Lab Test 07/21/21  1804 07/07/21  1059    138   POTASSIUM 5.1 4.7   CHLORIDE 109 106   CO2 29 25   ANIONGAP 4 6   * 202*   BUN 19 26   CR 0.98 0.79   YELENA 8.5 8.3*       Lab Results   Component Value Date    WBC 6.9 07/21/2021    WBC 7.0 07/07/2021     Lab Results   Component Value Date    RBC 4.81 07/21/2021    RBC 4.83 07/07/2021     Lab Results   Component Value Date    HGB 13.7 07/21/2021    HGB 14.0 07/07/2021     Lab Results   Component Value Date    HCT 41.4 07/21/2021    HCT 40.1 07/07/2021     No components found for: MCT  Lab Results   Component Value Date    MCV 86 07/21/2021    MCV 83 07/07/2021     Lab Results   Component Value Date    MCH 28.5 07/21/2021    MCH 29.0 07/07/2021     Lab Results   Component Value Date    MCHC 33.1 07/21/2021    MCHC 34.9 07/07/2021     Lab Results   Component Value Date    RDW 12.5 07/21/2021    RDW 12.2 07/07/2021     Lab Results   Component Value Date     07/21/2021     07/07/2021       Color Urine (no units)   Date Value   07/21/2021 Yellow   06/09/2021 Yellow     Appearance Urine (no units)   Date Value   07/21/2021 Clear   06/09/2021 Clear     Glucose Urine (mg/dL)   Date Value   07/21/2021 50  (A)   06/09/2021 150 (A)     Bilirubin Urine (no units)   Date Value   07/21/2021 Negative   06/09/2021 Negative     Ketones Urine (mg/dL)   Date Value   07/21/2021 Negative   06/09/2021 Negative     Specific Gravity Urine (no units)   Date Value    07/21/2021 1.018   06/09/2021 1.018     pH Urine   Date Value   07/21/2021 5.0   06/09/2021 5.0 pH     Protein Albumin Urine (mg/dL)   Date Value   07/21/2021 >499 (A)   06/09/2021 100 (A)     Nitrite Urine (no units)   Date Value   07/21/2021 Negative   06/09/2021 Negative     Leukocyte Esterase Urine (no units)   Date Value   07/21/2021 Negative   06/09/2021 Negative           ASSESSMENT and PLAN:   Loy was seen today for telephone.    Diagnoses and all orders for this visit:    Chronic kidney disease, stage 2 (mild)  Persistent proteinuria  The patient began having albuminuria about 9 months post-transplant, which, if causing FSGS-changes, would be relatively quickly. SPEP/immunofix/SFLC previously WNL. His DM is a post-tx diagnosis (Tx was 12/2018). The patient and I discussed the uncertainty in his kidney diagnosis, though with CNI-effects remaining at the top of the DDX. He and I discussed a kidney biopsy, of which the patient appeared very hesitant to pursue. He noted feeling somewhat traumatized by his recent (negative) cystoscopy. While only having a moderate pre-test probability, we will test the patient for serum PLA2R antibodies for possible membranous nephropathy (which might negate the need for Bx if positive), and perhaps even more importantly, I've discussed the case with the liver Tx team, and they plan an empiric transition off of CNI to see if this improves (or at least results in stability in) the patients proteinuria with only modest loss of GFR.   -     [facility]; PLA2R quantitative antibody (Laboratory Miscellaneous Order); Future    Liver transplant recipient (H)  I have discussed my CNI concerns with the liver tx team, and they have informed me they intend to begin titrating the patient off CNI and will begin Cellcept and prednisone. Will see if this leads to an improvement in proteinuria (beyond that seen on this most recent measurement, which may be due to improved  BP control).     Essential hypertension  Normotensive on recent documented BP. No change to losartan 50mg daily (with high-normal serum K, see below), amlodipine 10mg daily, or metoprolol 75mg bid.     Electrolytes, acid-base  High-normal serum potassium, likely due to ARB and CNI-effects. CTM with usual post-transplant monitoring labs. Patient not on Bactrim.       Return to clinic in 3 months for in-person visit.    Ryan Eaton MD  Nephrology  Pager: [phone]

## 2021-07-23 NOTE — TELEPHONE ENCOUNTER
Spoke to pt with interpretor Kacie. When discussing tacrolimus change, pt stated that he has been taking tacro 2mg daily for quite some time. Pt states he's been taking it that way since his transplant. Spoke to Jeny ZAMARRIPA who instructs pt to take tacro 1mg BID, starting with this evenings dose, 12 hours apart and have all tx labs drawn on Monday morning. Kacie and pt were able to repeat instructions. Writer scheduled an appt with pt at the [facility] for 9:15am on Monday with additional appt noted to draw blood at 8am.

## 2021-07-23 NOTE — PROGRESS NOTES
Loy is a 66 year old who is being evaluated via a billable telephone visit.      What phone number would you like to be contacted at? [phone]  How would you like to obtain your AVS? Mail a copy  Phone call duration: 29 minutes     [phone]    Dean Eaton MD  Renal

## 2021-07-23 NOTE — TELEPHONE ENCOUNTER
Mr. Limon has large proteinuria.  Reviewed w/ Dr. Carballo.    We will go ahead and wean off tacrolimus and instead use cellcept 750 mg po bid and pred 5 mg daily as new IS regimen.    Will wean as follows:  Start taking 750 mg cellcept bid and prednisone 5 mg daily.  On that same day, (week 1) decrease tacrolimus to 1 mg in the morning and 2 mg in the evening po bid for a week, then week 2, decrease to 1 mg po bid, then week 3 take 1 mg daily for a week, then stop tacrolimus.   He will need metabolic panel and hepatic panel q 2 week x 4, then monthly x 2, then back to every 3 mos to assure that his liver tests don't change.  Stop tac levels.

## 2021-07-26 ENCOUNTER — LAB (OUTPATIENT)
Dept: LAB | Facility: CLINIC | Age: 66
End: 2021-07-26
Payer: COMMERCIAL

## 2021-07-26 DIAGNOSIS — Z13.220 LIPID SCREENING: ICD-10-CM

## 2021-07-26 DIAGNOSIS — Z94.4 LIVER REPLACED BY TRANSPLANT (H): ICD-10-CM

## 2021-07-26 DIAGNOSIS — R80.1 PERSISTENT PROTEINURIA: ICD-10-CM

## 2021-07-26 DIAGNOSIS — Z94.4 LIVER REPLACED BY TRANSPLANT (H): Primary | ICD-10-CM

## 2021-07-26 LAB
ALBUMIN SERPL-MCNC: 3.3 G/DL (ref 3.4–5)
ALP SERPL-CCNC: 136 U/L (ref 40–150)
ALT SERPL W P-5'-P-CCNC: 17 U/L (ref 0–70)
ANION GAP SERPL CALCULATED.3IONS-SCNC: 2 MMOL/L (ref 3–14)
AST SERPL W P-5'-P-CCNC: 9 U/L (ref 0–45)
BILIRUB DIRECT SERPL-MCNC: 0.2 MG/DL (ref 0–0.2)
BILIRUB SERPL-MCNC: 0.8 MG/DL (ref 0.2–1.3)
BUN SERPL-MCNC: 24 MG/DL (ref 7–30)
CALCIUM SERPL-MCNC: 8.8 MG/DL (ref 8.5–10.1)
CHLORIDE BLD-SCNC: 110 MMOL/L (ref 94–109)
CO2 SERPL-SCNC: 27 MMOL/L (ref 20–32)
CREAT SERPL-MCNC: 0.84 MG/DL (ref 0.66–1.25)
ERYTHROCYTE [DISTWIDTH] IN BLOOD BY AUTOMATED COUNT: 12.4 % (ref 10–15)
GFR SERPL CREATININE-BSD FRML MDRD: 90 ML/MIN/1.73M2
GLUCOSE BLD-MCNC: 219 MG/DL (ref 70–99)
HCT VFR BLD AUTO: 41.5 % (ref 40–53)
HGB BLD-MCNC: 14 G/DL (ref 13.3–17.7)
Lab: NORMAL
MCH RBC QN AUTO: 28.3 PG (ref 26.5–33)
MCHC RBC AUTO-ENTMCNC: 33.7 G/DL (ref 31.5–36.5)
MCV RBC AUTO: 84 FL (ref 78–100)
PERFORMING LABORATORY: NORMAL
PLATELET # BLD AUTO: 217 10E3/UL (ref 150–450)
POTASSIUM BLD-SCNC: 4.8 MMOL/L (ref 3.4–5.3)
PROT SERPL-MCNC: 6.8 G/DL (ref 6.8–8.8)
RBC # BLD AUTO: 4.95 10E6/UL (ref 4.4–5.9)
SODIUM SERPL-SCNC: 139 MMOL/L (ref 133–144)
SPECIMEN STATUS: NORMAL
TACROLIMUS BLD-MCNC: 4.1 UG/L (ref 5–15)
TEST NAME: NORMAL
TME LAST DOSE: ABNORMAL H
TME LAST DOSE: ABNORMAL H
WBC # BLD AUTO: 6.1 10E3/UL (ref 4–11)

## 2021-07-26 PROCEDURE — 80053 COMPREHEN METABOLIC PANEL: CPT | Performed by: PATHOLOGY

## 2021-07-26 PROCEDURE — 80197 ASSAY OF TACROLIMUS: CPT | Performed by: INTERNAL MEDICINE

## 2021-07-26 PROCEDURE — 85027 COMPLETE CBC AUTOMATED: CPT | Performed by: PATHOLOGY

## 2021-07-26 PROCEDURE — 86255 FLUORESCENT ANTIBODY SCREEN: CPT | Performed by: PATHOLOGY

## 2021-07-26 PROCEDURE — 83520 IMMUNOASSAY QUANT NOS NONAB: CPT | Performed by: PATHOLOGY

## 2021-07-26 PROCEDURE — 36415 COLL VENOUS BLD VENIPUNCTURE: CPT | Performed by: PATHOLOGY

## 2021-07-26 PROCEDURE — 82248 BILIRUBIN DIRECT: CPT | Performed by: PATHOLOGY

## 2021-07-26 RX ORDER — PREDNISONE 5 MG/1
5 TABLET ORAL DAILY
Qty: 90 TABLET | Refills: 3 | Status: SHIPPED | OUTPATIENT
Start: 2021-07-26 | End: 2022-03-02

## 2021-07-26 RX ORDER — MYCOPHENOLATE MOFETIL 250 MG/1
750 CAPSULE ORAL 2 TIMES DAILY
Qty: 540 CAPSULE | Refills: 3 | Status: SHIPPED | OUTPATIENT
Start: 2021-07-26 | End: 2022-07-08

## 2021-07-26 NOTE — TELEPHONE ENCOUNTER
Dawood the pharmacist calls to state pt was there to  new medications. Informed Dawood that tacro was changed on Friday to 1mg BID and based on tacro results from today will determine directions on tacro. Writer informed Dawood that the two new medications have not been submitted yet based upon the lab results from today.

## 2021-07-26 NOTE — TELEPHONE ENCOUNTER
Spoke to pt through interpretor Frances and discussed the following medication changes with pt. Pt was able to repeat instructions several times before understanding.  Reminded pt that he will need labs drawn every other week.

## 2021-07-26 NOTE — TELEPHONE ENCOUNTER
Pt reported on Friday that he was taking tacrolimus 2 mg daily.  This is a bid drug.  We asked him to start taking 1 mg po bid.  There is no need to wean the tac further.  The day he picks up the cellcept 750 bid and pred 5 daily he can stop tac.  If he has taken the 1 mg am dose he should take the additional cellcept 750 mg and pred 5, hold evening tac dose and continue on cellcept 750 mg po bid and prednisone 5 mg daily.  No need for further tac levels after he has started cellcept / pred.

## 2021-07-28 DIAGNOSIS — E03.8 OTHER SPECIFIED HYPOTHYROIDISM: ICD-10-CM

## 2021-07-28 LAB — MAYO MISC RESULT: NORMAL

## 2021-07-28 RX ORDER — LEVOTHYROXINE SODIUM 100 UG/1
100 TABLET ORAL DAILY
Qty: 90 TABLET | Refills: 3 | Status: SHIPPED | OUTPATIENT
Start: 2021-07-28 | End: 2021-10-04

## 2021-07-28 NOTE — PROGRESS NOTES
Component      Latest Ref Rng & Units 7/21/2021   TSH      0.40 - 4.00 mU/L 8.05 (H)   T4 Free      0.76 - 1.46 ng/dL 0.88     Labs show that Mr. Limon needs a higher dose of levothyroxine. Please call and let him know that I have sent a new prescription to the pharmacy (please use a ). Letter also sent.     He will follow up in endocrine clinic in September and should have repeat TSH at that time.     Sue Tavares MD  Endocrinology and Diabetes   [phone]

## 2021-07-29 ENCOUNTER — TELEPHONE (OUTPATIENT)
Dept: ENDOCRINOLOGY | Facility: CLINIC | Age: 66
End: 2021-07-29

## 2021-07-29 ENCOUNTER — APPOINTMENT (OUTPATIENT)
Dept: INTERPRETER SERVICES | Facility: CLINIC | Age: 66
End: 2021-07-29
Payer: COMMERCIAL

## 2021-07-29 DIAGNOSIS — E11.65 UNCONTROLLED TYPE 2 DIABETES MELLITUS WITH HYPERGLYCEMIA (H): ICD-10-CM

## 2021-07-29 DIAGNOSIS — E03.9 HYPOTHYROIDISM, UNSPECIFIED TYPE: Primary | ICD-10-CM

## 2021-07-29 NOTE — TELEPHONE ENCOUNTER
Spoke w/ Pt via [facility] [place] speaking . Confirms understanding of review and recommendations. Will contact pharmacy for medication. No questions at this time.   Would like future lab draws to coincide with draws for other providers.   Lab orders pended to provider for visit prior ot 09/21/21  Maya Brown, RN on 7/29/2021 at 9:15 AM   Appointment Information   Name: Loy Limon MRN: [id number]   Date: 9/13/2021 Status: Delvis   Arrive By:       Appointment Time: 8:30 AM Length: 15   Visit Type: LAB [930] AYESHA:     Provider:  LAB           RE:   levothyroxine (SYNTHROID/LEVOTHROID) 100 MCG tablet 90 tablet 3 7/28/2021  No   Sig - Route: Take 1 tablet (100 mcg) by mouth daily - Oral     Message  Received: Yesterday  Sue Tavares MD  [facility] Triage-  Labs show that Mr. Limon needs a higher dose of levothyroxine. Please call and let him know that I have sent a new prescription to the pharmacy (please use a ). Letter also sent.     I have cc-ed Leia as an FYI, she will see this patient in Sept. Leia, please follow up on levothyroxine dose and repeat TSH at that time

## 2021-07-29 NOTE — TELEPHONE ENCOUNTER
----- Message from Sue Tavares MD sent at 7/28/2021  5:01 PM CDT -----  Labs show that Mr. Limon needs a higher dose of levothyroxine. Please call and let him know that I have sent a new prescription to the pharmacy (please use a ). Letter also sent.     I have cc-annamaria Dupree as an FYI, she will see this patient in Sept. Leia, please follow up on levothyroxine dose and repeat TSH at that time. I left a note in the chart to remind you.     Thank you!     Sue

## 2021-08-02 ENCOUNTER — LAB (OUTPATIENT)
Dept: LAB | Facility: CLINIC | Age: 66
End: 2021-08-02
Payer: COMMERCIAL

## 2021-08-02 DIAGNOSIS — Z94.4 LIVER REPLACED BY TRANSPLANT (H): ICD-10-CM

## 2021-08-02 DIAGNOSIS — Z13.220 LIPID SCREENING: ICD-10-CM

## 2021-08-02 LAB
ALBUMIN SERPL-MCNC: 3.4 G/DL (ref 3.4–5)
ALP SERPL-CCNC: 137 U/L (ref 40–150)
ALT SERPL W P-5'-P-CCNC: 16 U/L (ref 0–70)
ANION GAP SERPL CALCULATED.3IONS-SCNC: 6 MMOL/L (ref 3–14)
AST SERPL W P-5'-P-CCNC: 8 U/L (ref 0–45)
BILIRUB DIRECT SERPL-MCNC: 0.2 MG/DL (ref 0–0.2)
BILIRUB SERPL-MCNC: 0.8 MG/DL (ref 0.2–1.3)
BUN SERPL-MCNC: 15 MG/DL (ref 7–30)
CALCIUM SERPL-MCNC: 9.2 MG/DL (ref 8.5–10.1)
CHLORIDE BLD-SCNC: 105 MMOL/L (ref 94–109)
CO2 SERPL-SCNC: 30 MMOL/L (ref 20–32)
CREAT SERPL-MCNC: 0.78 MG/DL (ref 0.66–1.25)
ERYTHROCYTE [DISTWIDTH] IN BLOOD BY AUTOMATED COUNT: 12.3 % (ref 10–15)
GFR SERPL CREATININE-BSD FRML MDRD: >90 ML/MIN/1.73M2
GLUCOSE BLD-MCNC: 206 MG/DL (ref 70–99)
HCT VFR BLD AUTO: 44.9 % (ref 40–53)
HGB BLD-MCNC: 14.8 G/DL (ref 13.3–17.7)
MCH RBC QN AUTO: 28.2 PG (ref 26.5–33)
MCHC RBC AUTO-ENTMCNC: 33 G/DL (ref 31.5–36.5)
MCV RBC AUTO: 86 FL (ref 78–100)
PLATELET # BLD AUTO: 226 10E3/UL (ref 150–450)
POTASSIUM BLD-SCNC: 4.6 MMOL/L (ref 3.4–5.3)
PROT SERPL-MCNC: 7.4 G/DL (ref 6.8–8.8)
RBC # BLD AUTO: 5.25 10E6/UL (ref 4.4–5.9)
SODIUM SERPL-SCNC: 141 MMOL/L (ref 133–144)
WBC # BLD AUTO: 7.2 10E3/UL (ref 4–11)

## 2021-08-02 PROCEDURE — 80053 COMPREHEN METABOLIC PANEL: CPT | Performed by: PATHOLOGY

## 2021-08-02 PROCEDURE — 82248 BILIRUBIN DIRECT: CPT | Performed by: PATHOLOGY

## 2021-08-02 PROCEDURE — 85027 COMPLETE CBC AUTOMATED: CPT | Performed by: PATHOLOGY

## 2021-08-02 PROCEDURE — 36415 COLL VENOUS BLD VENIPUNCTURE: CPT | Performed by: PATHOLOGY

## 2021-08-04 ENCOUNTER — ALLIED HEALTH/NURSE VISIT (OUTPATIENT)
Dept: EDUCATION SERVICES | Facility: CLINIC | Age: 66
End: 2021-08-04
Payer: COMMERCIAL

## 2021-08-04 DIAGNOSIS — E11.9 TYPE 2 DIABETES MELLITUS (H): Primary | ICD-10-CM

## 2021-08-04 PROCEDURE — G0108 DIAB MANAGE TRN  PER INDIV: HCPCS

## 2021-08-04 NOTE — PROGRESS NOTES
DIABETES EDUCATION NOTE:    Loy Limon presents today for education related to Type 2 diabetes.    Patient is being treated with:  oral agents, diet, exercise and insulin  He is accompanied by self    PATIENT CONCERNS RELATED TO DIABETES SELF MANAGEMENT:   High blood sugar doing to damage to body    ASSESSMENT: type 2 diabetes, a1c above target    Current Diabetes Management per Patient:  Taking diabetes medications?   yes:  No missed doses   Diabetes Medication(s)     Biguanides       metFORMIN (GLUCOPHAGE-XR) 500 MG 24 hr tablet    Take 2 tablets (1,000 mg) by mouth 2 times daily (with meals)    Dipeptidyl Peptidase-4 (DPP-4) Inhibitors       sitagliptin (JANUVIA) 25 MG tablet    Take 1 tablet (25 mg) by mouth daily    Diabetic Other       glucose 40 % (400 mg/mL) gel    Take 15-30 g by mouth every 15 minutes as needed for low blood sugar    Insulin       insulin glargine (LANTUS PEN) 100 UNIT/ML pen    Take 10 units in the morning and 10 units in the evening     insulin glargine (LANTUS SOLOSTAR) 100 UNIT/ML pen    12 Units Inject 14 units subcutaneous daily.      , Injectable Medications: Lantus 10-0-0-10, Problems taking diabetes medications regularly? No  Monitoring  Patient glucose self monitoring as follows: two times daily.   BG meter: Contour Next One  meter  BG results: see blood glucose log attached to this encounter         BG values are: Not in goal  Patient's most recent   Lab Results   Component Value Date    A1C 9.1 07/07/2021    is not meeting goal of <7.0    Activity: less active right now due to heat    Nutrition:  He reports 3 tortillas, beans and strawberries for breakfast today    Hypoglycemia:   Patient is at risk of hypoglycemia? Yes    If yes, point at which symptoms are experienced:  <100  What is standard treatment?:      Healthy Coping and Stress Management: reports low stress                     EDUCATION and INSTRUCTION PROVIDED AT THIS VISIT:     We discussed his current Pasquale  report and he was shown what happens to his blood sugar when he eats.  He reports high carb meals.  Some education was done today on ways to cut the carb.  Some pictures were sent home with him regarding this and he is encouraged to take a picture of some of his typical meals.  He has been scheduled with the RD and did not show up for the appt.      Will increase his Lantus to 15 units in the am and 15 units in the pm.  He is encouraged to talk with his wife about adding the mealtime insulin.    He has follow up in place with Leia Edward 9/21.  Will see him back 9/7 to put a sensor on him so she has data for that appt.      Patient-stated goal written and given to Loy Limon.  Verbalized and demonstrated understanding of instructions.     PLAN:  See patient instructions  AVS printed and given to patient    FOLLOW-UP:    Time spent with patient at today's visit was 40 minutes.      Any diabetes medication dose changes were made via the CDE Protocol and Collaborative Practice Agreement with [facility] and  Sarah.  A copy of this encounter was provided to patient's referring provider.

## 2021-08-04 NOTE — PATIENT INSTRUCTIONS
1. Check your blood sugar twice a day.  Before breakfast and 2 hours after dinner.  Your blood sugar goals are: Before meals:  mg/dl  2 hours after a meal: <180  2. Increase your Lantus to 15 units in the morning and 15 units in the evening.  3. Take a picture of some of your meals and bring them to our next meeting.  4. Try to cut back on the amount of carbs you eat.  Smaller portions of tortillas, fruit, rice and beans.  5. Think about taking insulin with each meal.  6. Return 9/7 8 am    Marcy Wolf RN,[place] [address]  Phone: [phone]  [email or web address]

## 2021-08-06 ENCOUNTER — TELEPHONE (OUTPATIENT)
Dept: NEPHROLOGY | Facility: CLINIC | Age: 66
End: 2021-08-06

## 2021-08-11 ENCOUNTER — APPOINTMENT (OUTPATIENT)
Dept: INTERPRETER SERVICES | Facility: CLINIC | Age: 66
End: 2021-08-11
Payer: COMMERCIAL

## 2021-08-16 ENCOUNTER — APPOINTMENT (OUTPATIENT)
Dept: LAB | Facility: CLINIC | Age: 66
End: 2021-08-16
Payer: COMMERCIAL

## 2021-08-16 LAB
ALBUMIN SERPL-MCNC: 3.3 G/DL (ref 3.4–5)
ALP SERPL-CCNC: 112 U/L (ref 40–150)
ALT SERPL W P-5'-P-CCNC: 17 U/L (ref 0–70)
ANION GAP SERPL CALCULATED.3IONS-SCNC: 3 MMOL/L (ref 3–14)
AST SERPL W P-5'-P-CCNC: 12 U/L (ref 0–45)
BILIRUB DIRECT SERPL-MCNC: 0.2 MG/DL (ref 0–0.2)
BILIRUB SERPL-MCNC: 0.7 MG/DL (ref 0.2–1.3)
BUN SERPL-MCNC: 19 MG/DL (ref 7–30)
CALCIUM SERPL-MCNC: 8.7 MG/DL (ref 8.5–10.1)
CHLORIDE BLD-SCNC: 109 MMOL/L (ref 94–109)
CO2 SERPL-SCNC: 28 MMOL/L (ref 20–32)
CREAT SERPL-MCNC: 0.84 MG/DL (ref 0.66–1.25)
ERYTHROCYTE [DISTWIDTH] IN BLOOD BY AUTOMATED COUNT: 12.7 % (ref 10–15)
GFR SERPL CREATININE-BSD FRML MDRD: 90 ML/MIN/1.73M2
GLUCOSE BLD-MCNC: 133 MG/DL (ref 70–99)
HCT VFR BLD AUTO: 43.4 % (ref 40–53)
HGB BLD-MCNC: 14.3 G/DL (ref 13.3–17.7)
MCH RBC QN AUTO: 28.5 PG (ref 26.5–33)
MCHC RBC AUTO-ENTMCNC: 32.9 G/DL (ref 31.5–36.5)
MCV RBC AUTO: 87 FL (ref 78–100)
PLATELET # BLD AUTO: 195 10E3/UL (ref 150–450)
POTASSIUM BLD-SCNC: 4.6 MMOL/L (ref 3.4–5.3)
PROT SERPL-MCNC: 6.9 G/DL (ref 6.8–8.8)
RBC # BLD AUTO: 5.01 10E6/UL (ref 4.4–5.9)
SODIUM SERPL-SCNC: 140 MMOL/L (ref 133–144)
WBC # BLD AUTO: 6.2 10E3/UL (ref 4–11)

## 2021-08-16 PROCEDURE — 85027 COMPLETE CBC AUTOMATED: CPT | Performed by: PATHOLOGY

## 2021-08-16 PROCEDURE — 36415 COLL VENOUS BLD VENIPUNCTURE: CPT | Performed by: PATHOLOGY

## 2021-08-16 PROCEDURE — 80053 COMPREHEN METABOLIC PANEL: CPT | Performed by: PATHOLOGY

## 2021-08-16 PROCEDURE — 82248 BILIRUBIN DIRECT: CPT | Performed by: PATHOLOGY

## 2021-08-18 ENCOUNTER — PRE VISIT (OUTPATIENT)
Dept: UROLOGY | Facility: CLINIC | Age: 66
End: 2021-08-18

## 2021-08-18 NOTE — TELEPHONE ENCOUNTER
Reason for visit: follow up     Relevant information: micro hematuria    Records/imaging/labs/orders: PSA, in epic    Pt called: no    At Rooming: phonecall

## 2021-08-26 ENCOUNTER — DOCUMENTATION ONLY (OUTPATIENT)
Dept: FAMILY MEDICINE | Facility: CLINIC | Age: 66
End: 2021-08-26

## 2021-08-26 DIAGNOSIS — E11.9 DM TYPE 2, GOAL HBA1C 7%-8% (H): ICD-10-CM

## 2021-08-26 DIAGNOSIS — I48.91 ATRIAL FIBRILLATION WITH RAPID VENTRICULAR RESPONSE (H): ICD-10-CM

## 2021-08-30 ENCOUNTER — LAB (OUTPATIENT)
Dept: LAB | Facility: CLINIC | Age: 66
End: 2021-08-30
Payer: COMMERCIAL

## 2021-08-30 DIAGNOSIS — Z94.4 LIVER REPLACED BY TRANSPLANT (H): ICD-10-CM

## 2021-08-30 DIAGNOSIS — Z13.220 LIPID SCREENING: ICD-10-CM

## 2021-08-30 LAB
ALBUMIN SERPL-MCNC: 2.9 G/DL (ref 3.4–5)
ALBUMIN UR-MCNC: 100 MG/DL
ALP SERPL-CCNC: 570 U/L (ref 40–150)
ALT SERPL W P-5'-P-CCNC: 458 U/L (ref 0–70)
ANION GAP SERPL CALCULATED.3IONS-SCNC: 7 MMOL/L (ref 3–14)
APPEARANCE UR: CLEAR
AST SERPL W P-5'-P-CCNC: 190 U/L (ref 0–45)
BILIRUB DIRECT SERPL-MCNC: 0.8 MG/DL (ref 0–0.2)
BILIRUB SERPL-MCNC: 1.3 MG/DL (ref 0.2–1.3)
BILIRUB UR QL STRIP: NEGATIVE
BUN SERPL-MCNC: 27 MG/DL (ref 7–30)
CALCIUM SERPL-MCNC: 8.6 MG/DL (ref 8.5–10.1)
CHLORIDE BLD-SCNC: 108 MMOL/L (ref 94–109)
CHOLEST SERPL-MCNC: 256 MG/DL
CO2 SERPL-SCNC: 27 MMOL/L (ref 20–32)
COLOR UR AUTO: YELLOW
CREAT SERPL-MCNC: 0.88 MG/DL (ref 0.66–1.25)
CREAT UR-MCNC: 82 MG/DL
FASTING STATUS PATIENT QL REPORTED: YES
GFR SERPL CREATININE-BSD FRML MDRD: 88 ML/MIN/1.73M2
GLUCOSE BLD-MCNC: 192 MG/DL (ref 70–99)
GLUCOSE UR STRIP-MCNC: NEGATIVE MG/DL
HDLC SERPL-MCNC: 42 MG/DL
HGB UR QL STRIP: ABNORMAL
KETONES UR STRIP-MCNC: NEGATIVE MG/DL
LDLC SERPL CALC-MCNC: 186 MG/DL
LEUKOCYTE ESTERASE UR QL STRIP: NEGATIVE
MUCOUS THREADS #/AREA URNS LPF: PRESENT /LPF
NITRATE UR QL: NEGATIVE
NONHDLC SERPL-MCNC: 214 MG/DL
PH UR STRIP: 5 [PH] (ref 5–7)
POTASSIUM BLD-SCNC: 4.3 MMOL/L (ref 3.4–5.3)
PROT SERPL-MCNC: 6.6 G/DL (ref 6.8–8.8)
PROT UR-MCNC: 2.32 G/L
PROT/CREAT 24H UR: 2.83 G/G CR (ref 0–0.2)
RBC URINE: 3 /HPF
SODIUM SERPL-SCNC: 142 MMOL/L (ref 133–144)
SP GR UR STRIP: 1.02 (ref 1–1.03)
TRIGL SERPL-MCNC: 138 MG/DL
UROBILINOGEN UR STRIP-MCNC: 2 MG/DL
WBC URINE: 1 /HPF

## 2021-08-30 PROCEDURE — 36415 COLL VENOUS BLD VENIPUNCTURE: CPT | Performed by: PATHOLOGY

## 2021-08-30 PROCEDURE — 81001 URINALYSIS AUTO W/SCOPE: CPT | Performed by: PATHOLOGY

## 2021-08-30 PROCEDURE — 80061 LIPID PANEL: CPT | Performed by: PATHOLOGY

## 2021-08-30 PROCEDURE — 80053 COMPREHEN METABOLIC PANEL: CPT | Performed by: PATHOLOGY

## 2021-08-30 PROCEDURE — 82248 BILIRUBIN DIRECT: CPT | Performed by: PATHOLOGY

## 2021-08-30 PROCEDURE — 84156 ASSAY OF PROTEIN URINE: CPT | Performed by: PATHOLOGY

## 2021-08-30 NOTE — PROGRESS NOTES
Telephone call to the client's home for annual health risk assessment.  An  was present.    Current situation/living environment  Client lives with his wife in a 1 bedroom apartment. He is no longer able to work due to his health but is hoping to return at some point.     Activities of daily living (ADL)/instrumental activities of daily living (IADL) and functional issues  Client needs help with the following ADL's: n/a-independnet  Client needs help with the following IADL's: n/a-independent  Client states he is unable to perform the above due to n/a-client is independent with ADLs and IADLs. He continues to drive and also manages his appointments and medications independently.      Health concerns for today  Client continues to have bloating in his abdomin that bothers him but no MD has been able to find cause or treatment for this. BG has been elevated. He is working with the diabetes educator and feels since adding another medication his BG has been much improved the past couple weeks.  Has patient fallen 2 or more times in the last year? No  Has patient fallen with injury in the last year? No    Cognition/mental health  Client is A&O x3. He denies any MH symptoms.    STARS/Med Adherence  Client is non-compliant with the following quality measures: Diabetes care - eye exam  Comments: Let client know he should schedule appt at [facility] or any [facility](he had been trying to go to [facility] eye clinic where he was told they don't take his insurance). Let client know to contact CC if he wants scheduling help.     Client's Plan of Care consists of:  No formal services.    Amparo Sandoval RN  [facility] Care Coordinator  [phone]

## 2021-08-31 ENCOUNTER — VIRTUAL VISIT (OUTPATIENT)
Dept: UROLOGY | Facility: CLINIC | Age: 66
End: 2021-08-31
Payer: COMMERCIAL

## 2021-08-31 DIAGNOSIS — C61 PROSTATE CANCER (H): Primary | ICD-10-CM

## 2021-08-31 DIAGNOSIS — N39.3 STRESS INCONTINENCE OF URINE: ICD-10-CM

## 2021-08-31 PROCEDURE — T1013 SIGN LANG/ORAL INTERPRETER: HCPCS | Mod: GT

## 2021-08-31 PROCEDURE — 99214 OFFICE O/P EST MOD 30 MIN: CPT | Mod: 95 | Performed by: PHYSICIAN ASSISTANT

## 2021-08-31 RX ORDER — METFORMIN HCL 500 MG
1000 TABLET, EXTENDED RELEASE 24 HR ORAL 2 TIMES DAILY WITH MEALS
Qty: 360 TABLET | Refills: 1 | Status: SHIPPED | OUTPATIENT
Start: 2021-08-31 | End: 2021-09-16

## 2021-08-31 RX ORDER — METOPROLOL TARTRATE 75 MG/1
1 TABLET, FILM COATED ORAL 2 TIMES DAILY
Qty: 180 TABLET | Refills: 3 | Status: SHIPPED | OUTPATIENT
Start: 2021-08-31 | End: 2021-09-16

## 2021-08-31 ASSESSMENT — PAIN SCALES - GENERAL: PAINLEVEL: NO PAIN (0)

## 2021-08-31 NOTE — CONFIDENTIAL NOTE
7/7/2021  [facility]    Metoprolol Tartrate 75 MG TABS    Medium med alert    Warnings Override History for Metoprolol Tartrate 75 MG TABS [[id number]]    Overridden by Izabela Muhammad RN on Jun 24, 2021 11:00 AM   Drug-Drug   1. QUINAZOLINES / BETA-ADRENERGIC BLOCKERS [Level: Moderate] [Reason: Tolerated medication/side effects in past]   Other Orders: alfuzosin ER (UROXATRAL) 10 MG 24 hr tablet        metFORMIN (GLUCOPHAGE-XR) 500 MG 24 hr tablet

## 2021-08-31 NOTE — PROGRESS NOTES
"HPI: Mr. Loy Limon is a 66 year old year old male presenting today for evaluation of chief complaint(s): RECHECK (Prostate cancer follow up with PSA)    Today, he is accompanied by a  on the phone    Mr. Limon has PMH significant for HTN, DM, and immunosuppression 2/2 liver transplant.  More recently he also developed prostate cancer (4/26/19 - PSA 5.51ng/dL) and is now s/p RALP with Dr. Casiano on 1/3/20, final path: Rebecca 3+4=7, neg margins, [id number].  Decipher was obtained and he is in HIGH risk group (0.70).     He was last seen in Urology virtually on 4/27/21.  At that time his PSA was undetectable.  He was still wearing 2-3ppd for leak with coughing, sitting on a chair.  His most recent PVR on 2/1/21 was 47cc. He was working with his healthcare team to bring down blood sugars.  He was seeing nephrology for proteinuria.  He had microhematuria and on 7/9/21 he underwent normal cystoscopy with Dr. Leal.  He also has an MR abdomen from 2/1/21 showing simple renal cortical cysts, but no pathology.      Today he returns in routine followup.  Last PSA on 7/21/21 was undetectable.  He was last seen by Nephrology for proteinuria on 7/23/21 and kidney biopsy was being considered but the patient was hesitant.  He continues to follow up with a Diabetes nurse to optimize blood sugars (7/7/21 A1C was 9.1%).      - Today he is doing \"so, so.  I'm not 100%.\"    - The swelling on his belly (on the side where the liver was placed).  It never gets better, ever since the liver transplant.  This makes him very sad.   - VOIDING SYMPTOMS:  Urine is always yellow, no matter how much he drinks.  Will leak a little bit of urine from standing up from a seated position.  Will also leak a few drops with coughing.  Still wearing 2-3 pads per day.  With sleeping, stays dry and wears no pads.  May leak a few drops with traveling to the bathroom in the middle of the night.  Gets up every 1 or ever 1.5 hours to " void.  Did see pelvic floor physical therapist a few times in the past 3 times - he was told that he was unable to do pelvic floor exercises and he should just give up.  Not interested in surgical evaluation for SOL.      Current Outpatient Medications   Medication Sig Dispense Refill     alfuzosin ER (UROXATRAL) 10 MG 24 hr tablet        amLODIPine (NORVASC) 10 MG tablet Take 1 tablet (10 mg) by mouth daily 90 tablet 3     aspirin (ASA) 325 MG EC tablet Take 1 tablet (325 mg) by mouth daily 90 tablet 3     bisacodyl (DULCOLAX) 10 MG suppository Place 1 suppository (10 mg) rectally daily For constipation. Stop if diarrhea occurs. 10 suppository 1     blood glucose (NO BRAND SPECIFIED) lancets standard Use to test blood sugar 4 times daily or as directed. 400 each 2     blood glucose (ONETOUCH VERIO IQ) test strip Use to test blood sugar 2 times daily or as directed. 200 strip 11     blood glucose monitoring (NO BRAND SPECIFIED) meter device kit Use to test blood sugar 2 times daily or as directed. Please let the pt know when it is ready to . 1 kit 0     glucose 40 % (400 mg/mL) gel Take 15-30 g by mouth every 15 minutes as needed for low blood sugar 30 g 3     insulin glargine (LANTUS PEN) 100 UNIT/ML pen Take 10 units in the morning and 10 units in the evening 15 mL 3     insulin glargine (LANTUS SOLOSTAR) 100 UNIT/ML pen 12 Units Inject 14 units subcutaneous daily. 15 mL 1     insulin pen needle (BD KIRK U/F) 32G X 4 MM miscellaneous Use 1 daily. 200 each 3     Lancets (ONETOUCH DELICA PLUS [id number]) MISC U TO TEST QID OR UTD       levothyroxine (SYNTHROID/LEVOTHROID) 100 MCG tablet Take 1 tablet (100 mcg) by mouth daily 90 tablet 3     losartan (COZAAR) 50 MG tablet Take 1 tablet (50 mg) by mouth daily 30 tablet 11     magnesium hydroxide (MILK OF MAGNESIA) 400 MG/5ML suspension Take 30 mLs by mouth daily as needed for constipation or heartburn (Stop if diarrhea occurs) 355 mL 1     metFORMIN  (GLUCOPHAGE-XR) 500 MG 24 hr tablet Take 2 tablets (1,000 mg) by mouth 2 times daily (with meals) 360 tablet 1     Metoprolol Tartrate 75 MG TABS Take 75 mg by mouth 2 times daily 180 tablet 0     mineral oil liquid Take 30 mLs by mouth daily For constipation. Stop if diarrhea occurs. 500 mL 1     Multiple Vitamin (DAILY-STEVE) TABS Take 1 tablet by mouth daily 100 tablet 3     mycophenolate (GENERIC EQUIVALENT) 250 MG capsule Take 3 capsules (750 mg) by mouth 2 times daily 540 capsule 3     polyethylene glycol (MIRALAX) 17 GM/Dose powder Take 17 g (1 capful) by mouth daily 507 g 3     predniSONE (DELTASONE) 5 MG tablet Take 1 tablet (5 mg) by mouth daily 90 tablet 3     Sharps Container MISC 1 each daily 1 each 2     sildenafil (VIAGRA) 100 MG tablet Take 1/2 to 1 full pill by mouth 1/2 hour before sexual activity 10 tablet 3     sitagliptin (JANUVIA) 25 MG tablet Take 1 tablet (25 mg) by mouth daily 90 tablet 1     tacrolimus (GENERIC EQUIVALENT) 1 MG capsule Take 2 capsules (2 mg) by mouth every 12 hours 120 capsule 11     ursodiol (ACTIGALL) 250 MG tablet Take 1 tablet (250 mg) by mouth 2 times daily 180 tablet 3       ALLERGIES: Patient has no known allergies.      REVIEW OF SYSTEMS:  As above in HPI    GENERAL PHYSICAL EXAM:   Vitals: There were no vitals taken for this visit.  There is no height or weight on file to calculate BMI.    NEURO: Alert and oriented x 3.  PSYCH: Normal mood and affect, pleasant and agreeable during interview    RADIOLOGY: The following tests were reviewed:   MRI ABDOMEN 2/1/2021     CLINICAL HISTORY:  follow up HCC s/p liver transplant; HCC  (hepatocellular carcinoma) (H)     TECHNIQUE:  Images were acquired with and without intravenous contrast  through the abdomen. The following MR images were acquired: TrueFISP,  multiplanar T2 weighted, axial T1 in/out of phase, axial fat-saturated  T1, diffusion-weighted. Multiplanar T1-weighted images with fat  saturation were before contrast  administration and at multiple time  points following the administration of intravenous contrast. Contrast  dose: 7.5cc's Gadavist     FINDINGS:     Comparison study: MRI 9/24/2020     Liver: Postsurgical changes of orthotopic liver transplantation. No  abnormal parenchymal signal. No focal masses identified. No  significant hepatic steatosis or iron deposition. No significant  intrahepatic biliary duct dilatation. Prominent common bile duct,  measures 9 mm.     Gallbladder: Surgically absent.       Spleen: Normal, not enlarged.     Kidneys:  Several T2 hyperintense nonenhancing simple appearing  cortical renal cysts. No kidney stone, or masses. No pelvocaliectasis.        Adrenal glands: Normal.      Pancreas:  Fatty atrophy of the pancreas. Normal appearance and signal  characteristics of the remaining pancreatic parenchyma. No focal  masses. Pancreatic duct normal in caliber. No side branch ductal  dilatation.      Bowel: Unremarkable, with no evidence of bowel dilatation or abnormal  wall enhancement. Colonic diverticulosis without evidence of  diverticulitis.        Lymph nodes: No abdominal lymphadenopathy by size criteria.     Blood vessels: Major blood vessels are patent with no evidence of clot  or obstruction.         Lung bases: Mild bilateral lower lobes dependent atelectasis. No  abnormal T2 hyperintensity in the lung bases.     Bones and soft tissues: Degenerative changes of the lumbar spine. No  evidence of acute bony abnormality or abnormal soft tissue  enhancement.        Mesentery and abdominal wall: Postoperative changes along the anterior  abdominal wall. Fat-containing umbilical hernia.     Ascites: No free fluid is detected.                                                                        IMPRESSION:  Postoperative changes of liver transplantation. No suspicious liver  lesions.    LABS: The last test results for Mr. Loy Limon were reviewed:  PSA -   Lab Results   Component Value  Date    PSA <0.01 07/21/2021    PSA <0.01 04/23/2021    PSA <0.01 03/24/2021    PSA <0.01 02/01/2021    PSA <0.01 09/25/2020    PSA <0.01 07/03/2020    PSA <0.01 03/30/2020    PSA 5.51 04/26/2019    PSA 5.02 03/19/2019    PSA 5.25 03/04/2019    PSA 3.27 08/15/2018     BMP -   Recent Labs   Lab Test 08/30/21  0849 08/16/21  0843 08/02/21  0815 07/21/21  1804 07/07/21  1059 04/23/21  0755 03/24/21  0812 02/01/21  0723 09/25/20  0820 01/03/20  2034 12/18/19  0753    140 141 142   < > 138 140 136 135  --  140   POTASSIUM 4.3 4.6 4.6 5.1   < > 4.9 4.5 4.5 4.6  --  4.5   CHLORIDE 108 109 105 109   < > 108 106 104 104  --  110*   CO2 27 28 30 29   < > 26 26 28 26  --  27   BUN 27 19 15 19   < > 16 24 23 26  --  25   CR 0.88 0.84 0.78 0.98   < > 0.84 0.86 0.75 0.98  --  0.77   * 133* 206* 202*   < > 200* 241* 296* 320*  --  166*   YELENA 8.6 8.7 9.2 8.5   < > 8.3* 8.9 8.9 8.6  --  8.4*   MAG  --   --   --   --   --   --  1.9 2.2 2.2   < > 2.0   PHOS  --   --   --  3.0  --  2.6  --   --   --   --  2.7    < > = values in this interval not displayed.       CBC -   Recent Labs   Lab Test 08/16/21  0843 08/02/21  0815 07/26/21  0902   WBC 6.2 7.2 6.1   HGB 14.3 14.8 14.0    226 217       ASSESSMENT:   1) Prostate cancer s/p RALP with Dr. Casiano on 1/3/20, final path: Rebecca 3+4=7, neg margins, [id number].    2) Microhematuria in an immunocompromised liver transplant patient.    3) Proteinuria  4) Nocturia in the setting of uncontrolled blood sugars  5) Urinary incontinence    PLAN:   - OK to stop alfuzosin (if you are still taking it)  - Continue to work with your team to get blood sugars under control  - Referral to Physical Therapy - to help with urine control  - Return in 6 months with a PSA first    TELEPHONE VISIT DURATION: 32 minutes (8:29 - 8:56 + 5 minutes documentation on DOS)    Kerri Pascual PA-C  Department of Urologic Surgery

## 2021-08-31 NOTE — PROGRESS NOTES
Please call  Services for assistance with today's visit at [phone].    Contact number for patient is [phone].    Loy is a 66 year old who is being evaluated via a billable telephone visit.      What phone number would you like to be contacted at? [phone]  How would you like to obtain your AVS? Mail a copy  TELEPHONE VISIT DURATION: 32 minutes (8:29 - 8:56 + 5 minutes documentation on DOS)

## 2021-08-31 NOTE — PATIENT INSTRUCTIONS
PLAN:   - OK to stop alfuzosin (if you are still taking it).  You do not need this medication anymore  - Continue to work with your team to get blood sugars under control  - Referral to Physical Therapy - to help with urine control  - Return in 6 months with a PSA first    [facility]

## 2021-08-31 NOTE — LETTER
"8/31/2021       RE: Loy Limon  [address]  [facility] [address]     Dear Colleague,    Thank you for referring your patient, Loy Limon, to the [facility] [place] at [facility]. Please see a copy of my visit note below.    HPI: Mr. Loy Limon is a 66 year old year old male presenting today for evaluation of chief complaint(s): RECHECK (Prostate cancer follow up with PSA)    Today, he is accompanied by a  on the phone    Mr. Limon has PMH significant for HTN, DM, and immunosuppression 2/2 liver transplant.  More recently he also developed prostate cancer (4/26/19 - PSA 5.51ng/dL) and is now s/p RALP with Dr. Casiano on 1/3/20, final path: Rebecca 3+4=7, neg margins, [id number].  Decipher was obtained and he is in HIGH risk group (0.70).     He was last seen in Urology virtually on 4/27/21.  At that time his PSA was undetectable.  He was still wearing 2-3ppd for leak with coughing, sitting on a chair.  His most recent PVR on 2/1/21 was 47cc. He was working with his healthcare team to bring down blood sugars.  He was seeing nephrology for proteinuria.  He had microhematuria and on 7/9/21 he underwent normal cystoscopy with Dr. Leal.  He also has an MR abdomen from 2/1/21 showing simple renal cortical cysts, but no pathology.      Today he returns in routine followup.  Last PSA on 7/21/21 was undetectable.  He was last seen by Nephrology for proteinuria on 7/23/21 and kidney biopsy was being considered but the patient was hesitant.  He continues to follow up with a Diabetes nurse to optimize blood sugars (7/7/21 A1C was 9.1%).      - Today he is doing \"so, so.  I'm not 100%.\"    - The swelling on his belly (on the side where the liver was placed).  It never gets better, ever since the liver transplant.  This makes him very sad.   - VOIDING SYMPTOMS:  Urine is always yellow, no matter how " much he drinks.  Will leak a little bit of urine from standing up from a seated position.  Will also leak a few drops with coughing.  Still wearing 2-3 pads per day.  With sleeping, stays dry and wears no pads.  May leak a few drops with traveling to the bathroom in the middle of the night.  Gets up every 1 or ever 1.5 hours to void.  Did see pelvic floor physical therapist a few times in the past 3 times - he was told that he was unable to do pelvic floor exercises and he should just give up.  Not interested in surgical evaluation for SOL.      Current Outpatient Medications   Medication Sig Dispense Refill     alfuzosin ER (UROXATRAL) 10 MG 24 hr tablet        amLODIPine (NORVASC) 10 MG tablet Take 1 tablet (10 mg) by mouth daily 90 tablet 3     aspirin (ASA) 325 MG EC tablet Take 1 tablet (325 mg) by mouth daily 90 tablet 3     bisacodyl (DULCOLAX) 10 MG suppository Place 1 suppository (10 mg) rectally daily For constipation. Stop if diarrhea occurs. 10 suppository 1     blood glucose (NO BRAND SPECIFIED) lancets standard Use to test blood sugar 4 times daily or as directed. 400 each 2     blood glucose (ONETOUCH VERIO IQ) test strip Use to test blood sugar 2 times daily or as directed. 200 strip 11     blood glucose monitoring (NO BRAND SPECIFIED) meter device kit Use to test blood sugar 2 times daily or as directed. Please let the pt know when it is ready to . 1 kit 0     glucose 40 % (400 mg/mL) gel Take 15-30 g by mouth every 15 minutes as needed for low blood sugar 30 g 3     insulin glargine (LANTUS PEN) 100 UNIT/ML pen Take 10 units in the morning and 10 units in the evening 15 mL 3     insulin glargine (LANTUS SOLOSTAR) 100 UNIT/ML pen 12 Units Inject 14 units subcutaneous daily. 15 mL 1     insulin pen needle (BD KIRK U/F) 32G X 4 MM miscellaneous Use 1 daily. 200 each 3     Lancets (ONETOUCH DELICA PLUS [id number]) MISC U TO TEST QID OR UTD       levothyroxine (SYNTHROID/LEVOTHROID) 100 MCG tablet  Take 1 tablet (100 mcg) by mouth daily 90 tablet 3     losartan (COZAAR) 50 MG tablet Take 1 tablet (50 mg) by mouth daily 30 tablet 11     magnesium hydroxide (MILK OF MAGNESIA) 400 MG/5ML suspension Take 30 mLs by mouth daily as needed for constipation or heartburn (Stop if diarrhea occurs) 355 mL 1     metFORMIN (GLUCOPHAGE-XR) 500 MG 24 hr tablet Take 2 tablets (1,000 mg) by mouth 2 times daily (with meals) 360 tablet 1     Metoprolol Tartrate 75 MG TABS Take 75 mg by mouth 2 times daily 180 tablet 0     mineral oil liquid Take 30 mLs by mouth daily For constipation. Stop if diarrhea occurs. 500 mL 1     Multiple Vitamin (DAILY-STEVE) TABS Take 1 tablet by mouth daily 100 tablet 3     mycophenolate (GENERIC EQUIVALENT) 250 MG capsule Take 3 capsules (750 mg) by mouth 2 times daily 540 capsule 3     polyethylene glycol (MIRALAX) 17 GM/Dose powder Take 17 g (1 capful) by mouth daily 507 g 3     predniSONE (DELTASONE) 5 MG tablet Take 1 tablet (5 mg) by mouth daily 90 tablet 3     Sharps Container MISC 1 each daily 1 each 2     sildenafil (VIAGRA) 100 MG tablet Take 1/2 to 1 full pill by mouth 1/2 hour before sexual activity 10 tablet 3     sitagliptin (JANUVIA) 25 MG tablet Take 1 tablet (25 mg) by mouth daily 90 tablet 1     tacrolimus (GENERIC EQUIVALENT) 1 MG capsule Take 2 capsules (2 mg) by mouth every 12 hours 120 capsule 11     ursodiol (ACTIGALL) 250 MG tablet Take 1 tablet (250 mg) by mouth 2 times daily 180 tablet 3       ALLERGIES: Patient has no known allergies.      REVIEW OF SYSTEMS:  As above in HPI    GENERAL PHYSICAL EXAM:   Vitals: There were no vitals taken for this visit.  There is no height or weight on file to calculate BMI.    NEURO: Alert and oriented x 3.  PSYCH: Normal mood and affect, pleasant and agreeable during interview    RADIOLOGY: The following tests were reviewed:   MRI ABDOMEN 2/1/2021     CLINICAL HISTORY:  follow up HCC s/p liver transplant; HCC  (hepatocellular carcinoma)  (H)     TECHNIQUE:  Images were acquired with and without intravenous contrast  through the abdomen. The following MR images were acquired: TrueFISP,  multiplanar T2 weighted, axial T1 in/out of phase, axial fat-saturated  T1, diffusion-weighted. Multiplanar T1-weighted images with fat  saturation were before contrast administration and at multiple time  points following the administration of intravenous contrast. Contrast  dose: 7.5cc's Gadavist     FINDINGS:     Comparison study: MRI 9/24/2020     Liver: Postsurgical changes of orthotopic liver transplantation. No  abnormal parenchymal signal. No focal masses identified. No  significant hepatic steatosis or iron deposition. No significant  intrahepatic biliary duct dilatation. Prominent common bile duct,  measures 9 mm.     Gallbladder: Surgically absent.       Spleen: Normal, not enlarged.     Kidneys:  Several T2 hyperintense nonenhancing simple appearing  cortical renal cysts. No kidney stone, or masses. No pelvocaliectasis.        Adrenal glands: Normal.      Pancreas:  Fatty atrophy of the pancreas. Normal appearance and signal  characteristics of the remaining pancreatic parenchyma. No focal  masses. Pancreatic duct normal in caliber. No side branch ductal  dilatation.      Bowel: Unremarkable, with no evidence of bowel dilatation or abnormal  wall enhancement. Colonic diverticulosis without evidence of  diverticulitis.        Lymph nodes: No abdominal lymphadenopathy by size criteria.     Blood vessels: Major blood vessels are patent with no evidence of clot  or obstruction.         Lung bases: Mild bilateral lower lobes dependent atelectasis. No  abnormal T2 hyperintensity in the lung bases.     Bones and soft tissues: Degenerative changes of the lumbar spine. No  evidence of acute bony abnormality or abnormal soft tissue  enhancement.        Mesentery and abdominal wall: Postoperative changes along the anterior  abdominal wall. Fat-containing umbilical  hernia.     Ascites: No free fluid is detected.                                                                        IMPRESSION:  Postoperative changes of liver transplantation. No suspicious liver  lesions.    LABS: The last test results for Mr. Loy Limon were reviewed:  PSA -   Lab Results   Component Value Date    PSA <0.01 07/21/2021    PSA <0.01 04/23/2021    PSA <0.01 03/24/2021    PSA <0.01 02/01/2021    PSA <0.01 09/25/2020    PSA <0.01 07/03/2020    PSA <0.01 03/30/2020    PSA 5.51 04/26/2019    PSA 5.02 03/19/2019    PSA 5.25 03/04/2019    PSA 3.27 08/15/2018     BMP -   Recent Labs   Lab Test 08/30/21  0849 08/16/21  0843 08/02/21  0815 07/21/21  1804 07/07/21  1059 04/23/21  0755 03/24/21  0812 02/01/21  0723 09/25/20  0820 01/03/20  2034 12/18/19  0753    140 141 142   < > 138 140 136 135  --  140   POTASSIUM 4.3 4.6 4.6 5.1   < > 4.9 4.5 4.5 4.6  --  4.5   CHLORIDE 108 109 105 109   < > 108 106 104 104  --  110*   CO2 27 28 30 29   < > 26 26 28 26  --  27   BUN 27 19 15 19   < > 16 24 23 26  --  25   CR 0.88 0.84 0.78 0.98   < > 0.84 0.86 0.75 0.98  --  0.77   * 133* 206* 202*   < > 200* 241* 296* 320*  --  166*   YELENA 8.6 8.7 9.2 8.5   < > 8.3* 8.9 8.9 8.6  --  8.4*   MAG  --   --   --   --   --   --  1.9 2.2 2.2   < > 2.0   PHOS  --   --   --  3.0  --  2.6  --   --   --   --  2.7    < > = values in this interval not displayed.       CBC -   Recent Labs   Lab Test 08/16/21  0843 08/02/21  0815 07/26/21  0902   WBC 6.2 7.2 6.1   HGB 14.3 14.8 14.0    226 217       ASSESSMENT:   1) Prostate cancer s/p RALP with Dr. Casiano on 1/3/20, final path: Rebecca 3+4=7, neg margins, [id number].    2) Microhematuria in an immunocompromised liver transplant patient.    3) Proteinuria  4) Nocturia in the setting of uncontrolled blood sugars  5) Urinary incontinence    PLAN:   - OK to stop alfuzosin (if you are still taking it)  - Continue to work with your team to get blood sugars under  control  - Referral to Physical Therapy - to help with urine control  - Return in 6 months with a PSA first    TELEPHONE VISIT DURATION: 32 minutes (8:29 - 8:56 + 5 minutes documentation on DOS)    Kerri Pascual PA-C  Department of Urologic Surgery      Please call  Services for assistance with today's visit at [phone].    Contact number for patient is [phone].    Loy is a 66 year old who is being evaluated via a billable telephone visit.      What phone number would you like to be contacted at? [phone]  How would you like to obtain your AVS? Mail a copy  TELEPHONE VISIT DURATION: 32 minutes (8:29 - 8:56 + 5 minutes documentation on DOS)

## 2021-09-01 ENCOUNTER — TELEPHONE (OUTPATIENT)
Dept: UROLOGY | Facility: CLINIC | Age: 66
End: 2021-09-01

## 2021-09-02 ENCOUNTER — APPOINTMENT (OUTPATIENT)
Dept: INTERPRETER SERVICES | Facility: CLINIC | Age: 66
End: 2021-09-02
Payer: COMMERCIAL

## 2021-09-03 ENCOUNTER — ANCILLARY PROCEDURE (OUTPATIENT)
Dept: MRI IMAGING | Facility: CLINIC | Age: 66
End: 2021-09-03
Attending: INTERNAL MEDICINE
Payer: COMMERCIAL

## 2021-09-03 ENCOUNTER — LAB (OUTPATIENT)
Dept: LAB | Facility: CLINIC | Age: 66
End: 2021-09-03
Payer: COMMERCIAL

## 2021-09-03 DIAGNOSIS — C22.0 HCC (HEPATOCELLULAR CARCINOMA) (H): ICD-10-CM

## 2021-09-03 LAB — AFP SERPL-MCNC: <1.5 UG/L (ref 0–8)

## 2021-09-03 PROCEDURE — 36415 COLL VENOUS BLD VENIPUNCTURE: CPT | Performed by: PATHOLOGY

## 2021-09-03 PROCEDURE — 82105 ALPHA-FETOPROTEIN SERUM: CPT | Mod: 90 | Performed by: PATHOLOGY

## 2021-09-03 PROCEDURE — A9585 GADOBUTROL INJECTION: HCPCS | Performed by: RADIOLOGY

## 2021-09-03 PROCEDURE — 74183 MRI ABD W/O CNTR FLWD CNTR: CPT | Performed by: RADIOLOGY

## 2021-09-03 RX ORDER — GADOBUTROL 604.72 MG/ML
10 INJECTION INTRAVENOUS ONCE
Status: COMPLETED | OUTPATIENT
Start: 2021-09-03 | End: 2021-09-03

## 2021-09-03 RX ADMIN — GADOBUTROL 9 ML: 604.72 INJECTION INTRAVENOUS at 17:37

## 2021-09-03 NOTE — DISCHARGE INSTRUCTIONS
MRI Contrast Discharge Instructions    The IV contrast you received today will pass out of your body in your  urine. This will happen in the next 24 hours. You will not feel this process.  Your urine will not change color.    Drink at least 4 extra glasses of water or juice today (unless your doctor  has restricted your fluids). This reduces the stress on your kidneys.  You may take your regular medicines.    If you are on dialysis: It is best to have dialysis today.    If you have a reaction: Most reactions happen right away. If you have  any new symptoms after leaving the hospital (such as hives or swelling),  call your hospital at the correct number below. Or call your family doctor.  If you have breathing distress or wheezing, call 911.    Special instructions: ***    I have read and understand the above information.    Signature:______________________________________ Date:___________    Staff:__________________________________________ Date:___________     Time:__________    [facility] Rad[facility] Departments:    ___[place]: [phone]  ___[facility]: [phone]  ___[place]: [phone] ___[facility]: [phone]  ___[facility]: [phone]  ___[facility]: [phone]  ___[phone]  ___[facility]: [phone]  ___[phone]

## 2021-09-07 ENCOUNTER — TELEPHONE (OUTPATIENT)
Dept: TRANSPLANT | Facility: CLINIC | Age: 66
End: 2021-09-07

## 2021-09-07 DIAGNOSIS — Z94.4 LIVER REPLACED BY TRANSPLANT (H): Primary | ICD-10-CM

## 2021-09-07 NOTE — TELEPHONE ENCOUNTER
Instructed pt through  [facility] the need to repeat tx labs this Thursday. Scheduled pt for this Thursday. Pt acknowledged the appt. Informed pt that if liver labs are abnormal, we may have to have a liver biopsy. But informed pt that we will await liver results first. Pt understood.

## 2021-09-09 ENCOUNTER — TELEPHONE (OUTPATIENT)
Dept: TRANSPLANT | Facility: CLINIC | Age: 66
End: 2021-09-09

## 2021-09-09 ENCOUNTER — LAB (OUTPATIENT)
Dept: LAB | Facility: CLINIC | Age: 66
End: 2021-09-09
Payer: COMMERCIAL

## 2021-09-09 ENCOUNTER — VIRTUAL VISIT (OUTPATIENT)
Dept: ONCOLOGY | Facility: CLINIC | Age: 66
End: 2021-09-09
Attending: INTERNAL MEDICINE
Payer: COMMERCIAL

## 2021-09-09 DIAGNOSIS — C22.0 HCC (HEPATOCELLULAR CARCINOMA) (H): Primary | ICD-10-CM

## 2021-09-09 DIAGNOSIS — C61 PROSTATE CANCER (H): ICD-10-CM

## 2021-09-09 DIAGNOSIS — Z94.4 LIVER REPLACED BY TRANSPLANT (H): ICD-10-CM

## 2021-09-09 DIAGNOSIS — R79.89 ELEVATED LFTS: ICD-10-CM

## 2021-09-09 LAB
ALBUMIN SERPL-MCNC: 2.7 G/DL (ref 3.4–5)
ALP SERPL-CCNC: 702 U/L (ref 40–150)
ALT SERPL W P-5'-P-CCNC: 376 U/L (ref 0–70)
ANION GAP SERPL CALCULATED.3IONS-SCNC: 8 MMOL/L (ref 3–14)
AST SERPL W P-5'-P-CCNC: 106 U/L (ref 0–45)
BILIRUB DIRECT SERPL-MCNC: 1.8 MG/DL (ref 0–0.2)
BILIRUB SERPL-MCNC: 2.4 MG/DL (ref 0.2–1.3)
BUN SERPL-MCNC: 20 MG/DL (ref 7–30)
CALCIUM SERPL-MCNC: 9 MG/DL (ref 8.5–10.1)
CHLORIDE BLD-SCNC: 103 MMOL/L (ref 94–109)
CO2 SERPL-SCNC: 25 MMOL/L (ref 20–32)
CREAT SERPL-MCNC: 0.86 MG/DL (ref 0.66–1.25)
ERYTHROCYTE [DISTWIDTH] IN BLOOD BY AUTOMATED COUNT: 15.2 % (ref 10–15)
GFR SERPL CREATININE-BSD FRML MDRD: 89 ML/MIN/1.73M2
GLUCOSE BLD-MCNC: 228 MG/DL (ref 70–99)
HCT VFR BLD AUTO: 41.4 % (ref 40–53)
HGB BLD-MCNC: 13.6 G/DL (ref 13.3–17.7)
MCH RBC QN AUTO: 28.8 PG (ref 26.5–33)
MCHC RBC AUTO-ENTMCNC: 32.9 G/DL (ref 31.5–36.5)
MCV RBC AUTO: 88 FL (ref 78–100)
PLATELET # BLD AUTO: 252 10E3/UL (ref 150–450)
POTASSIUM BLD-SCNC: 4.7 MMOL/L (ref 3.4–5.3)
PROT SERPL-MCNC: 6.8 G/DL (ref 6.8–8.8)
PSA SERPL-MCNC: <0.01 UG/L (ref 0–4)
RBC # BLD AUTO: 4.72 10E6/UL (ref 4.4–5.9)
SODIUM SERPL-SCNC: 136 MMOL/L (ref 133–144)
WBC # BLD AUTO: 5.5 10E3/UL (ref 4–11)

## 2021-09-09 PROCEDURE — 85027 COMPLETE CBC AUTOMATED: CPT | Performed by: PATHOLOGY

## 2021-09-09 PROCEDURE — 80053 COMPREHEN METABOLIC PANEL: CPT | Mod: 90 | Performed by: PATHOLOGY

## 2021-09-09 PROCEDURE — 84153 ASSAY OF PSA TOTAL: CPT | Mod: 90 | Performed by: PATHOLOGY

## 2021-09-09 PROCEDURE — 36415 COLL VENOUS BLD VENIPUNCTURE: CPT | Performed by: PATHOLOGY

## 2021-09-09 PROCEDURE — 99214 OFFICE O/P EST MOD 30 MIN: CPT | Mod: 95 | Performed by: PHYSICIAN ASSISTANT

## 2021-09-09 PROCEDURE — 82248 BILIRUBIN DIRECT: CPT | Mod: 90 | Performed by: PATHOLOGY

## 2021-09-09 NOTE — TELEPHONE ENCOUNTER
Received a call from BASHIR Dennis who saw Loy today for HCC follow up. MRI looked good, no evidence of recurrent HCC but she did notice his liver tests remain significantly elevated. Labs sent to Dr. Carballo to review and advise.

## 2021-09-09 NOTE — PROGRESS NOTES
Loy is a 66 year old who is being evaluated via a billable telephone visit.      What phone number would you like to be contacted at? [phone]  How would you like to obtain your AVS? Mail a copy  Phone call duration: 12 minutes    23 minutes spent on the date of the encounter doing chart review, review of test results, interpretation of tests and documentation, in addition to 12 minutes spent on the phone with the patient.     Oncology follow up visit:  Date on this visit: Sep 9, 2021    Reason for visit: follow-up of HCC    History Of Present Illness:  Mr. Limon is a 66 year old male who has been a chronic heavy alcohol drinker presented with right abdominal pain in March 2018 and workup including a CT scan showed cirrhosis, portal hypertension and 2 hepatic lesions in the right hepatic lobe which were concerning for hepatocellular carcinoma.  He had MRI on 3/16/2018 which confirmed cirrhosis and portal hypertension and splenomegaly. 3.7 cm segment 8 lesion was OPTN 5B.  There was an antecedent 0.9 cm satellite nodule in hepatic segment 8 consistent with LI-RADS 4.  In segment 7, 1.5 cm LIRADS 4 lesion was seen  Another 1.9 cm segment 7 lesion was seen which was indeterminate.  Several other LIRADS 3 lesions were scattered.  Alpha-fetoprotein was not elevated.  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 Hepatitis B surface antibody was reactive consistent with effective vaccination. Hepatitis C antibody was nonreactive.  He has had no ascites. He has had mild hepatic and Neuropathy but was unable to tolerate lactulose because of abdominal discomfort. He has no history of variceal bleeding although he has grade 2 esophageal varices which have not been banded and he continues to be on propranolol.    He had repeat MRI done on 5/24/2018 which showed stable to slight increase in size of the segment 8 lesion.  Segment 7 lesion was consistent with LIRADS 4  lesion  Another segment 7 lesion is also consistent with LIRADS 4 lesion  CT of the chest showed a 4 mm solitary pulmonary nodule in the right lower lobe which remains indeterminate. There was no other evidence of metastasis.  Bone scan was negative for any evidence of metastatic disease.    He had chemoembolization of the S8 lesion on 6/13/18.    As there was residual tumor left, he had microwave ablation of residual mass in hepatic segment 8 on 7/2/2018.     S7 IRADS 4 lesions were not treated    Repeat MRI on 10/3/18 shows no residual viable tumor in S8 lesion  S7 LIRADS 4 lesion was less well defined.  He has some scattered LIRADS 3 lesions.    He had liver transplant in Dec 2018 and explanted liver showed S8 viable tumor with 90% necrosis.  S7 1.1 cm HCC and S5 1.4 cm HCC.  3 benign lymph nodes.  It was a ypT2N0 lesion.    He developed prostate cancer (4/26/19 - PSA 5.51ng/dL) and is now s/p RALP with Dr. Casiano on 1/3/20, final path: Rebecca 3+4=7, neg margins, [id number].   Last PSA on 7/3/2020- <0.01.    Interval hx:   -Patient is concerned that the change in his transplant medications has caused issues with his liver tests.  -Denies abdominal pain.   -Reports eating well.   -Reports drinking well.   -Reports frequent urination.   -Denies nausea or vomiting.   -Reports normal bowel movements.  -Reports mildly yellow skin.  -Starting yesterday, developed itchy skin.   -Denies any confusion.  -Energy is doing fairly well, but feels his diabetes impacts his energy at times.     Current Medications:  Current Outpatient Medications   Medication Sig Dispense Refill     alfuzosin ER (UROXATRAL) 10 MG 24 hr tablet        amLODIPine (NORVASC) 10 MG tablet Take 1 tablet (10 mg) by mouth daily 90 tablet 3     aspirin (ASA) 325 MG EC tablet Take 1 tablet (325 mg) by mouth daily 90 tablet 3     bisacodyl (DULCOLAX) 10 MG suppository Place 1 suppository (10 mg) rectally daily For constipation. Stop if diarrhea occurs. 10  suppository 1     blood glucose (NO BRAND SPECIFIED) lancets standard Use to test blood sugar 4 times daily or as directed. 400 each 2     blood glucose (ONETOUCH VERIO IQ) test strip Use to test blood sugar 2 times daily or as directed. 200 strip 11     blood glucose monitoring (NO BRAND SPECIFIED) meter device kit Use to test blood sugar 2 times daily or as directed. Please let the pt know when it is ready to . 1 kit 0     glucose 40 % (400 mg/mL) gel Take 15-30 g by mouth every 15 minutes as needed for low blood sugar 30 g 3     insulin glargine (LANTUS PEN) 100 UNIT/ML pen Take 10 units in the morning and 10 units in the evening 15 mL 3     insulin glargine (LANTUS SOLOSTAR) 100 UNIT/ML pen 12 Units Inject 14 units subcutaneous daily. 15 mL 1     insulin pen needle (BD KIRK U/F) 32G X 4 MM miscellaneous Use 1 daily. 200 each 3     Lancets (ONETOUCH DELICA PLUS [id number]) MISC U TO TEST QID OR UTD       levothyroxine (SYNTHROID/LEVOTHROID) 100 MCG tablet Take 1 tablet (100 mcg) by mouth daily 90 tablet 3     losartan (COZAAR) 50 MG tablet Take 1 tablet (50 mg) by mouth daily 30 tablet 11     magnesium hydroxide (MILK OF MAGNESIA) 400 MG/5ML suspension Take 30 mLs by mouth daily as needed for constipation or heartburn (Stop if diarrhea occurs) 355 mL 1     metFORMIN (GLUCOPHAGE-XR) 500 MG 24 hr tablet Take 2 tablets (1,000 mg) by mouth 2 times daily (with meals) 360 tablet 1     Metoprolol Tartrate 75 MG TABS Take 1 tablet by mouth 2 times daily 180 tablet 3     mycophenolate (GENERIC EQUIVALENT) 250 MG capsule Take 3 capsules (750 mg) by mouth 2 times daily 540 capsule 3     predniSONE (DELTASONE) 5 MG tablet Take 1 tablet (5 mg) by mouth daily 90 tablet 3     Sharps Container MISC 1 each daily 1 each 2     sildenafil (VIAGRA) 100 MG tablet Take 1/2 to 1 full pill by mouth 1/2 hour before sexual activity 10 tablet 3     sitagliptin (JANUVIA) 25 MG tablet Take 1 tablet (25 mg) by mouth daily 90 tablet 1      ursodiol (ACTIGALL) 250 MG tablet Take 1 tablet (250 mg) by mouth 2 times daily 180 tablet 3     mineral oil liquid Take 30 mLs by mouth daily For constipation. Stop if diarrhea occurs. (Patient not taking: Reported on 9/9/2021) 500 mL 1     Multiple Vitamin (DAILY-STEVE) TABS Take 1 tablet by mouth daily (Patient not taking: Reported on 9/9/2021) 100 tablet 3     polyethylene glycol (MIRALAX) 17 GM/Dose powder Take 17 g (1 capful) by mouth daily (Patient not taking: Reported on 9/9/2021) 507 g 3     tacrolimus (GENERIC EQUIVALENT) 1 MG capsule Take 2 capsules (2 mg) by mouth every 12 hours (Patient not taking: Reported on 9/9/2021) 120 capsule 11      Objective:  General: patient appears well in no acute distress, alert and oriented, speech clear and fluid  Skin: no visualized rash or lesions on visualized skin  Resp: Appears to be breathing comfortably without accessory muscle usage, speaking in full sentences, no audible wheezes or cough.  Psych: Coherent speech, normal rate and volume, able to articulate logical thoughts, able to abstract reason, no tangential thoughts, no hallucinations or delusions  Patient's affect is appropriate.    Laboratory/Imaging Studies  Most Recent 3 CBC's:  Recent Labs   Lab Test 09/09/21  0837 08/16/21  0843 08/02/21  0815   WBC 5.5 6.2 7.2   HGB 13.6 14.3 14.8   MCV 88 87 86    195 226    Most Recent 3 BMP's:  Recent Labs   Lab Test 09/09/21  0837 08/30/21  0849 08/16/21  0843    142 140   POTASSIUM 4.7 4.3 4.6   CHLORIDE 103 108 109   CO2 25 27 28   BUN 20 27 19   CR 0.86 0.88 0.84   ANIONGAP 8 7 3   YELENA 9.0 8.6 8.7   * 192* 133*    Most Recent 2 LFT's:  Recent Labs   Lab Test 09/09/21  0837 08/30/21  0849   * 190*   * 458*   ALKPHOS 702* 570*   BILITOTAL 2.4* 1.3      9/3/2021 17:57   Alpha Fetoprotein <1.5     I reviewed the above labs today.      Exam: MR ABDOMEN W/O & W CONTRAST, 9/5/2021 7:59 PM   Indication: History of HCC status post liver  transplant.   Comparison: MR abdomen 2/1/2021     Technique: Images were acquired with and without intravenous contrast  through the abdomen. The following MR images were acquired: TrueFISP,  multiplanar T2 weighted, axial T1 in/out of phase, diffusion-weighted.  Multiplanar T1-weighted images with fat saturation were before  contrast administration and at multiple time points following the  administration of intravenous contrast.      FINDINGS:  Liver: Postsurgical changes of liver transplantation. No hepatic mass  suspicious for HCC. The hepatic artery, portal vein, hepatic veins and  IVC are patent. There is no intra or extrahepatic biliary dilatation.  The gallbladder is surgically absent.   Spleen: measures 9.4 cm craniocaudally.  Kidneys: Stable punctate cortical cysts. No hydronephrosis or mass.  Adrenal glands: Normal.  Pancreas: Mild diffuse pancreatic atrophy. No pancreatic mass, ductal  dilatation or inflammation.  Bowel: Normal caliber.  Lymph nodes: No suspicious lymphadenopathy.  Bones and soft tissues: No suspicious osseous lesions.  Ascites: Absent..                                                      IMPRESSION:    1. Status post liver transplant without evidence of focal hepatic  Mass.    I reviewed the above imaging today.    ASSESSMENT/PLAN:  HCC. S/p liver transplant on December 2018. Imaging shows no evidence of recurrent disease. He will follow-up with Dr. Melgar in 6 months with labs and repeat abdominal MRI.     Elevated LFT's. Concerning for possible liver rejection. Had repeat labs today that show persistently elevated LFT's. He also has developed yellowing of his skin and pruritus. He is working with his transplant team and may need a liver biopsy. I called his transplant coordinator today to expedite planning for the patient.     Prostate Cancer. He developed prostate cancer (4/26/19 - PSA 5.51ng/dL) and is now s/p RALP with Dr. Casiano on 1/3/20, final path: Rebecca 3+4=7, neg margins,  [id number].   Last PSA was <0.01. he has issues with urinary incontinence and he will follow with Urology.  I reviewed notes from urology.    COVID-19 vaccine. Has received 2 doses of Moderna thus far. Recommend he received the third dose once the acute issues with his liver are resolved.     Audelia Breaux PA-C  [facility]  [address] [place] [address] [phone]

## 2021-09-09 NOTE — LETTER
9/9/2021         RE: Loy Limon  [address]  [facility] [address]        Dear Colleague,    Thank you for referring your patient, Loy Limon, to the [facility]. Please see a copy of my visit note below.    Loy is a 66 year old who is being evaluated via a billable telephone visit.      What phone number would you like to be contacted at? [phone]  How would you like to obtain your AVS? Mail a copy  Phone call duration: 12 minutes    23 minutes spent on the date of the encounter doing chart review, review of test results, interpretation of tests and documentation, in addition to 12 minutes spent on the phone with the patient.     Oncology follow up visit:  Date on this visit: Sep 9, 2021    Reason for visit: follow-up of HCC    History Of Present Illness:  Mr. Limon is a 66 year old male who has been a chronic heavy alcohol drinker presented with right abdominal pain in March 2018 and workup including a CT scan showed cirrhosis, portal hypertension and 2 hepatic lesions in the right hepatic lobe which were concerning for hepatocellular carcinoma.  He had MRI on 3/16/2018 which confirmed cirrhosis and portal hypertension and splenomegaly. 3.7 cm segment 8 lesion was OPTN 5B.  There was an antecedent 0.9 cm satellite nodule in hepatic segment 8 consistent with LI-RADS 4.  In segment 7, 1.5 cm LIRADS 4 lesion was seen  Another 1.9 cm segment 7 lesion was seen which was indeterminate.  Several other LIRADS 3 lesions were scattered.  Alpha-fetoprotein was not elevated.  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 Hepatitis B surface antibody was reactive consistent with effective vaccination. Hepatitis C antibody was nonreactive.  He has had no ascites. He has had mild hepatic and Neuropathy but was unable to tolerate lactulose because of abdominal discomfort. He has no history of variceal bleeding  although he has grade 2 esophageal varices which have not been banded and he continues to be on propranolol.    He had repeat MRI done on 5/24/2018 which showed stable to slight increase in size of the segment 8 lesion.  Segment 7 lesion was consistent with LIRADS 4 lesion  Another segment 7 lesion is also consistent with LIRADS 4 lesion  CT of the chest showed a 4 mm solitary pulmonary nodule in the right lower lobe which remains indeterminate. There was no other evidence of metastasis.  Bone scan was negative for any evidence of metastatic disease.    He had chemoembolization of the S8 lesion on 6/13/18.    As there was residual tumor left, he had microwave ablation of residual mass in hepatic segment 8 on 7/2/2018.     S7 IRADS 4 lesions were not treated    Repeat MRI on 10/3/18 shows no residual viable tumor in S8 lesion  S7 LIRADS 4 lesion was less well defined.  He has some scattered LIRADS 3 lesions.    He had liver transplant in Dec 2018 and explanted liver showed S8 viable tumor with 90% necrosis.  S7 1.1 cm HCC and S5 1.4 cm HCC.  3 benign lymph nodes.  It was a ypT2N0 lesion.    He developed prostate cancer (4/26/19 - PSA 5.51ng/dL) and is now s/p RALP with Dr. Casiano on 1/3/20, final path: [place] 3+4=7, neg margins, [id number].   Last PSA on 7/3/2020- <0.01.    Interval hx:   -Patient is concerned that the change in his transplant medications has caused issues with his liver tests.  -Denies abdominal pain.   -Reports eating well.   -Reports drinking well.   -Reports frequent urination.   -Denies nausea or vomiting.   -Reports normal bowel movements.  -Reports mildly yellow skin.  -Starting yesterday, developed itchy skin.   -Denies any confusion.  -Energy is doing fairly well, but feels his diabetes impacts his energy at times.     Current Medications:  Current Outpatient Medications   Medication Sig Dispense Refill     alfuzosin ER (UROXATRAL) 10 MG 24 hr tablet        amLODIPine (NORVASC) 10 MG tablet  Take 1 tablet (10 mg) by mouth daily 90 tablet 3     aspirin (ASA) 325 MG EC tablet Take 1 tablet (325 mg) by mouth daily 90 tablet 3     bisacodyl (DULCOLAX) 10 MG suppository Place 1 suppository (10 mg) rectally daily For constipation. Stop if diarrhea occurs. 10 suppository 1     blood glucose (NO BRAND SPECIFIED) lancets standard Use to test blood sugar 4 times daily or as directed. 400 each 2     blood glucose (ONETOUCH VERIO IQ) test strip Use to test blood sugar 2 times daily or as directed. 200 strip 11     blood glucose monitoring (NO BRAND SPECIFIED) meter device kit Use to test blood sugar 2 times daily or as directed. Please let the pt know when it is ready to . 1 kit 0     glucose 40 % (400 mg/mL) gel Take 15-30 g by mouth every 15 minutes as needed for low blood sugar 30 g 3     insulin glargine (LANTUS PEN) 100 UNIT/ML pen Take 10 units in the morning and 10 units in the evening 15 mL 3     insulin glargine (LANTUS SOLOSTAR) 100 UNIT/ML pen 12 Units Inject 14 units subcutaneous daily. 15 mL 1     insulin pen needle (BD KIRK U/F) 32G X 4 MM miscellaneous Use 1 daily. 200 each 3     Lancets (ONETOUCH DELICA PLUS [id number]) MISC U TO TEST QID OR UTD       levothyroxine (SYNTHROID/LEVOTHROID) 100 MCG tablet Take 1 tablet (100 mcg) by mouth daily 90 tablet 3     losartan (COZAAR) 50 MG tablet Take 1 tablet (50 mg) by mouth daily 30 tablet 11     magnesium hydroxide (MILK OF MAGNESIA) 400 MG/5ML suspension Take 30 mLs by mouth daily as needed for constipation or heartburn (Stop if diarrhea occurs) 355 mL 1     metFORMIN (GLUCOPHAGE-XR) 500 MG 24 hr tablet Take 2 tablets (1,000 mg) by mouth 2 times daily (with meals) 360 tablet 1     Metoprolol Tartrate 75 MG TABS Take 1 tablet by mouth 2 times daily 180 tablet 3     mycophenolate (GENERIC EQUIVALENT) 250 MG capsule Take 3 capsules (750 mg) by mouth 2 times daily 540 capsule 3     predniSONE (DELTASONE) 5 MG tablet Take 1 tablet (5 mg) by mouth daily  90 tablet 3     Sharps Container MISC 1 each daily 1 each 2     sildenafil (VIAGRA) 100 MG tablet Take 1/2 to 1 full pill by mouth 1/2 hour before sexual activity 10 tablet 3     sitagliptin (JANUVIA) 25 MG tablet Take 1 tablet (25 mg) by mouth daily 90 tablet 1     ursodiol (ACTIGALL) 250 MG tablet Take 1 tablet (250 mg) by mouth 2 times daily 180 tablet 3     mineral oil liquid Take 30 mLs by mouth daily For constipation. Stop if diarrhea occurs. (Patient not taking: Reported on 9/9/2021) 500 mL 1     Multiple Vitamin (DAILY-STEVE) TABS Take 1 tablet by mouth daily (Patient not taking: Reported on 9/9/2021) 100 tablet 3     polyethylene glycol (MIRALAX) 17 GM/Dose powder Take 17 g (1 capful) by mouth daily (Patient not taking: Reported on 9/9/2021) 507 g 3     tacrolimus (GENERIC EQUIVALENT) 1 MG capsule Take 2 capsules (2 mg) by mouth every 12 hours (Patient not taking: Reported on 9/9/2021) 120 capsule 11      Objective:  General: patient appears well in no acute distress, alert and oriented, speech clear and fluid  Skin: no visualized rash or lesions on visualized skin  Resp: Appears to be breathing comfortably without accessory muscle usage, speaking in full sentences, no audible wheezes or cough.  Psych: Coherent speech, normal rate and volume, able to articulate logical thoughts, able to abstract reason, no tangential thoughts, no hallucinations or delusions  Patient's affect is appropriate.    Laboratory/Imaging Studies  Most Recent 3 CBC's:  Recent Labs   Lab Test 09/09/21  0837 08/16/21  0843 08/02/21  0815   WBC 5.5 6.2 7.2   HGB 13.6 14.3 14.8   MCV 88 87 86    195 226    Most Recent 3 BMP's:  Recent Labs   Lab Test 09/09/21  0837 08/30/21  0849 08/16/21  0843    142 140   POTASSIUM 4.7 4.3 4.6   CHLORIDE 103 108 109   CO2 25 27 28   BUN 20 27 19   CR 0.86 0.88 0.84   ANIONGAP 8 7 3   YELENA 9.0 8.6 8.7   * 192* 133*    Most Recent 2 LFT's:  Recent Labs   Lab Test 09/09/21  0837  08/30/21  0849   * 190*   * 458*   ALKPHOS 702* 570*   BILITOTAL 2.4* 1.3      9/3/2021 17:57   Alpha Fetoprotein <1.5     I reviewed the above labs today.      Exam: MR ABDOMEN W/O & W CONTRAST, 9/5/2021 7:59 PM   Indication: History of HCC status post liver transplant.   Comparison: MR abdomen 2/1/2021     Technique: Images were acquired with and without intravenous contrast  through the abdomen. The following MR images were acquired: TrueFISP,  multiplanar T2 weighted, axial T1 in/out of phase, diffusion-weighted.  Multiplanar T1-weighted images with fat saturation were before  contrast administration and at multiple time points following the  administration of intravenous contrast.      FINDINGS:  Liver: Postsurgical changes of liver transplantation. No hepatic mass  suspicious for HCC. The hepatic artery, portal vein, hepatic veins and  IVC are patent. There is no intra or extrahepatic biliary dilatation.  The gallbladder is surgically absent.   Spleen: measures 9.4 cm craniocaudally.  Kidneys: Stable punctate cortical cysts. No hydronephrosis or mass.  Adrenal glands: Normal.  Pancreas: Mild diffuse pancreatic atrophy. No pancreatic mass, ductal  dilatation or inflammation.  Bowel: Normal caliber.  Lymph nodes: No suspicious lymphadenopathy.  Bones and soft tissues: No suspicious osseous lesions.  Ascites: Absent..                                                      IMPRESSION:    1. Status post liver transplant without evidence of focal hepatic  Mass.    I reviewed the above imaging today.    ASSESSMENT/PLAN:  HCC. S/p liver transplant on December 2018. Imaging shows no evidence of recurrent disease. He will follow-up with Dr. Melgar in 6 months with labs and repeat abdominal MRI.     Elevated LFT's. Concerning for possible liver rejection. Had repeat labs today that show persistently elevated LFT's. He also has developed yellowing of his skin and pruritus. He is working with his transplant team  and may need a liver biopsy. I called his transplant coordinator today to expedite planning for the patient.     Prostate Cancer. He developed prostate cancer (4/26/19 - PSA 5.51ng/dL) and is now s/p RALP with Dr. Casiano on 1/3/20, final path: [place] 3+4=7, neg margins, [id number].   Last PSA was <0.01. he has issues with urinary incontinence and he will follow with Urology.  I reviewed notes from urology.    COVID-19 vaccine. Has received 2 doses of Moderna thus far. Recommend he received the third dose once the acute issues with his liver are resolved.     Audelia Breaux PA-C  [facility]  [address] [address] [phone]            Again, thank you for allowing me to participate in the care of your patient.        Sincerely,        Audelia Breaux PA-C

## 2021-09-10 ENCOUNTER — TELEPHONE (OUTPATIENT)
Dept: TRANSPLANT | Facility: CLINIC | Age: 66
End: 2021-09-10

## 2021-09-10 ENCOUNTER — PATIENT OUTREACH (OUTPATIENT)
Dept: CARE COORDINATION | Facility: CLINIC | Age: 66
End: 2021-09-10

## 2021-09-10 ENCOUNTER — APPOINTMENT (OUTPATIENT)
Dept: INTERPRETER SERVICES | Facility: CLINIC | Age: 66
End: 2021-09-10
Payer: COMMERCIAL

## 2021-09-10 NOTE — PROGRESS NOTES
Oncology Distress Screening Follow-up  Clinical Social Work  [facility]    Identified Concern and Score From Distress Screenin. How concerned are you about your ability to eat?   0           2. How concerned are you about unintended weight loss or your current weight?   6Abnormal            3. How concerned are you about feeling depressed or very sad?   2           4. How concerned are you about feeling anxious or very scared?   0           5. Do you struggle with the loss of meaning and ramandeep in your life?   Quite a bitAbnormal            6. How concerned are you about work and home life issues that may be affected by your cancer?   6Abnormal            7. How concerned are you about knowing what resources are available to help you?   5           8. Do you currently have what you would describe as Restorationist or spiritual struggles?              Not at all                   Date of Distress Screenin21      Data: Loy seen in clinic yesterday for follow up of HCC.       Intervention/Education Provided:: BIA called and spoke with Loy this morning with support of  [id number]. SW introduced self and reason for call. SW noted elevated distress screen and checked in with how Loy has been doing. Loy acknowledged that he has been having a bit of a hard time, but this is significantly related to his medical needs and concerns related to his transplant. He denies any sw needs at this time, is not interested in resources. SW encouraged Loy to request SW services if needs changed and advised that the [facility] has SW's available as well.        Follow-up Required: SW will remain available for support and resources.           ABRAHAM Rodriguez, [facility]  Clinical , Adult Oncology  Phone: [phone]

## 2021-09-10 NOTE — TELEPHONE ENCOUNTER
Frances interpretor called on behalf of pt. Pt c/o itching all over his body for the past 4 days. Suggested that pt take benadryl but be sure not to operate a motor vehicle while taking. Informed pt that we are awaiting Dr. Carballo's response to elevated LFT's. It may require a biopsy. Explained how a biopsy is performed and pt remembers that he had one in the past. Please review most recent LFT's.

## 2021-09-13 ENCOUNTER — TELEPHONE (OUTPATIENT)
Dept: TRANSPLANT | Facility: CLINIC | Age: 66
End: 2021-09-13

## 2021-09-13 ENCOUNTER — LAB (OUTPATIENT)
Dept: LAB | Facility: CLINIC | Age: 66
End: 2021-09-13
Payer: COMMERCIAL

## 2021-09-13 DIAGNOSIS — Z94.4 LIVER REPLACED BY TRANSPLANT (H): ICD-10-CM

## 2021-09-13 DIAGNOSIS — E11.65 UNCONTROLLED TYPE 2 DIABETES MELLITUS WITH HYPERGLYCEMIA (H): ICD-10-CM

## 2021-09-13 DIAGNOSIS — E03.9 HYPOTHYROIDISM, UNSPECIFIED TYPE: ICD-10-CM

## 2021-09-13 DIAGNOSIS — R79.89 ABNORMAL LIVER FUNCTION TEST: Primary | ICD-10-CM

## 2021-09-13 LAB
ALBUMIN SERPL-MCNC: 2.6 G/DL (ref 3.4–5)
ALP SERPL-CCNC: 644 U/L (ref 40–150)
ALT SERPL W P-5'-P-CCNC: 188 U/L (ref 0–70)
ANION GAP SERPL CALCULATED.3IONS-SCNC: 3 MMOL/L (ref 3–14)
AST SERPL W P-5'-P-CCNC: 40 U/L (ref 0–45)
BILIRUB DIRECT SERPL-MCNC: 1 MG/DL (ref 0–0.2)
BILIRUB SERPL-MCNC: 1.5 MG/DL (ref 0.2–1.3)
BUN SERPL-MCNC: 24 MG/DL (ref 7–30)
CALCIUM SERPL-MCNC: 8.6 MG/DL (ref 8.5–10.1)
CHLORIDE BLD-SCNC: 106 MMOL/L (ref 94–109)
CHOLEST SERPL-MCNC: 400 MG/DL
CO2 SERPL-SCNC: 28 MMOL/L (ref 20–32)
CREAT SERPL-MCNC: 0.89 MG/DL (ref 0.66–1.25)
FASTING STATUS PATIENT QL REPORTED: ABNORMAL
GFR SERPL CREATININE-BSD FRML MDRD: 88 ML/MIN/1.73M2
GLUCOSE BLD-MCNC: 270 MG/DL (ref 70–99)
HBA1C MFR BLD: 7.9 % (ref 0–5.6)
HDLC SERPL-MCNC: 71 MG/DL
LDLC SERPL CALC-MCNC: 311 MG/DL
NONHDLC SERPL-MCNC: 329 MG/DL
POTASSIUM BLD-SCNC: 5.6 MMOL/L (ref 3.4–5.3)
PROT SERPL-MCNC: 6.5 G/DL (ref 6.8–8.8)
SODIUM SERPL-SCNC: 137 MMOL/L (ref 133–144)
T4 FREE SERPL-MCNC: 1 NG/DL (ref 0.76–1.46)
TRIGL SERPL-MCNC: 90 MG/DL
TSH SERPL DL<=0.005 MIU/L-ACNC: 4.07 MU/L (ref 0.4–4)

## 2021-09-13 PROCEDURE — 84439 ASSAY OF FREE THYROXINE: CPT | Performed by: PATHOLOGY

## 2021-09-13 PROCEDURE — 84443 ASSAY THYROID STIM HORMONE: CPT | Performed by: PATHOLOGY

## 2021-09-13 PROCEDURE — 82248 BILIRUBIN DIRECT: CPT | Performed by: PATHOLOGY

## 2021-09-13 PROCEDURE — 36415 COLL VENOUS BLD VENIPUNCTURE: CPT | Performed by: PATHOLOGY

## 2021-09-13 PROCEDURE — 80061 LIPID PANEL: CPT | Performed by: PATHOLOGY

## 2021-09-13 PROCEDURE — 80053 COMPREHEN METABOLIC PANEL: CPT | Performed by: PATHOLOGY

## 2021-09-13 PROCEDURE — 83036 HEMOGLOBIN GLYCOSYLATED A1C: CPT | Performed by: PATHOLOGY

## 2021-09-13 NOTE — TELEPHONE ENCOUNTER
Informed pt the need to have an MRCP. Pt states he was notified  And scheduled for the 23 rd of this month.     Writer rescheduled the MRCP to this Friday @ 7pm,. Pt has been notified.

## 2021-09-13 NOTE — TELEPHONE ENCOUNTER
Instructed pt through  Analy, that writer rescheduled MRCP to this Friday @ [place] with a 630 pm check in and 7 pm testing. Pt to refrain from caffeine for 4 hours prior to test. Pt understood.

## 2021-09-13 NOTE — TELEPHONE ENCOUNTER
Spoke with the patient and confirmed MRCP appointments on 9/23/21.  Informed patient an itinerary can be accessed on [employer]. Pt also requested schedule mailed.  .

## 2021-09-13 NOTE — TELEPHONE ENCOUNTER
Liver tests abnormal.  Per Dr. Carballo, needs MRC.  Order placed.  Message to .    Lucille, please call Claribel w/  to let him know he needs this to look at his bile duct to see if there might be a blockage

## 2021-09-16 ENCOUNTER — OFFICE VISIT (OUTPATIENT)
Dept: FAMILY MEDICINE | Facility: CLINIC | Age: 66
End: 2021-09-16
Payer: COMMERCIAL

## 2021-09-16 VITALS
RESPIRATION RATE: 16 BRPM | HEIGHT: 67 IN | BODY MASS INDEX: 30.81 KG/M2 | WEIGHT: 196.3 LBS | SYSTOLIC BLOOD PRESSURE: 156 MMHG | OXYGEN SATURATION: 95 % | DIASTOLIC BLOOD PRESSURE: 66 MMHG | HEART RATE: 71 BPM

## 2021-09-16 DIAGNOSIS — K74.60 CIRRHOSIS OF LIVER WITHOUT ASCITES, UNSPECIFIED HEPATIC CIRRHOSIS TYPE (H): Primary | ICD-10-CM

## 2021-09-16 DIAGNOSIS — R07.9 EXERTIONAL CHEST PAIN: Primary | ICD-10-CM

## 2021-09-16 DIAGNOSIS — R74.8 ELEVATED LIVER ENZYMES: ICD-10-CM

## 2021-09-16 DIAGNOSIS — E11.9 DM TYPE 2, GOAL HBA1C 7%-8% (H): ICD-10-CM

## 2021-09-16 DIAGNOSIS — E78.5 DYSLIPIDEMIA: ICD-10-CM

## 2021-09-16 DIAGNOSIS — I48.91 ATRIAL FIBRILLATION WITH RAPID VENTRICULAR RESPONSE (H): ICD-10-CM

## 2021-09-16 DIAGNOSIS — I10 ESSENTIAL HYPERTENSION: ICD-10-CM

## 2021-09-16 DIAGNOSIS — Z94.4 LIVER TRANSPLANT RECIPIENT (H): ICD-10-CM

## 2021-09-16 PROCEDURE — 99215 OFFICE O/P EST HI 40 MIN: CPT | Performed by: FAMILY MEDICINE

## 2021-09-16 RX ORDER — LOSARTAN POTASSIUM 50 MG/1
50 TABLET ORAL DAILY
Qty: 30 TABLET | Refills: 11 | Status: SHIPPED | OUTPATIENT
Start: 2021-09-16 | End: 2021-10-04

## 2021-09-16 RX ORDER — METOPROLOL TARTRATE 75 MG/1
1 TABLET, FILM COATED ORAL 2 TIMES DAILY
Qty: 180 TABLET | Refills: 3 | Status: SHIPPED | OUTPATIENT
Start: 2021-09-16 | End: 2021-10-04

## 2021-09-16 RX ORDER — AMLODIPINE BESYLATE 10 MG/1
10 TABLET ORAL DAILY
Qty: 90 TABLET | Refills: 3 | Status: SHIPPED | OUTPATIENT
Start: 2021-09-16 | End: 2021-10-04

## 2021-09-16 RX ORDER — METFORMIN HCL 500 MG
1000 TABLET, EXTENDED RELEASE 24 HR ORAL 2 TIMES DAILY WITH MEALS
Qty: 360 TABLET | Refills: 1 | Status: SHIPPED | OUTPATIENT
Start: 2021-09-16 | End: 2021-10-04

## 2021-09-16 RX ORDER — ASPIRIN 325 MG
325 TABLET, DELAYED RELEASE (ENTERIC COATED) ORAL DAILY
Qty: 90 TABLET | Refills: 3 | Status: SHIPPED | OUTPATIENT
Start: 2021-09-16 | End: 2021-10-04

## 2021-09-16 ASSESSMENT — MIFFLIN-ST. JEOR: SCORE: 1617.91

## 2021-09-16 ASSESSMENT — PAIN SCALES - GENERAL: PAINLEVEL: NO PAIN (0)

## 2021-09-16 NOTE — PROGRESS NOTES
"    Assessment & Plan     Essential hypertension  Cont as is for now, discxuss w/ cardio  - amLODIPine (NORVASC) 10 MG tablet; Take 1 tablet (10 mg) by mouth daily  - losartan (COZAAR) 50 MG tablet; Take 1 tablet (50 mg) by mouth daily    Liver transplant recipient (H)  Cont this for sure w/ possible cad  - aspirin (ASA) 325 MG EC tablet; Take 1 tablet (325 mg) by mouth daily    Atrial fibrillation with rapid ventricular response (H)  Cont as is  - Metoprolol Tartrate 75 MG TABS; Take 1 tablet by mouth 2 times daily    DM type 2, goal HbA1c 7%-8% (H)  Cont as is sees Endo in a few days  - metFORMIN (GLUCOPHAGE-XR) 500 MG 24 hr tablet; Take 2 tablets (1,000 mg) by mouth 2 times daily (with meals)    Exertional chest pain  Long discussion; per pt has a lot of business to attend to in [place] leaves in five weeks for four mo trip; has to go. Reviewed he needs full eval of what could very well be cad first, with liver/kidney disease I'd like cardio input on workup, symptoms present but he states no change last few months, no rest pain or acceleration  - Adult Cardiology Eval Referral    LFT elevation: discussed, was itchy/jaundiced not now, last set labs better, MR tmrw  Then f/u per GI    See me one mo right before leaves [place]      42 minutes spent on the date of the encounter doing chart review, history and exam, documentation and further activities per the note       BMI:   Estimated body mass index is 30.81 kg/m  as calculated from the following:    Height as of this encounter: 1.7 m (5' 6.93\").    Weight as of this encounter: 89 kg (196 lb 4.8 oz).           Return in about 1 month (around 10/16/2021).    Jaswinder Anne MD  [facility] is a 66 year old who presents for the following health issues  too    HPI Exertional   Five minute walk triggers symptoms  Short of breath and chest squeeze  No nausea  Does feels dizzy and sweaty at that " time  Over summer no better or worse  Better if stops walking  67 yo male, ex smoker, DM, htn    Could not complete stress test, at that time referred to cardiology for office call but has not been seen; discussed with ongoing symptoms and no diagnosis he should have cardiology consult    On daily asa    Going to [place] to deal w/ family/business affairs leaves in a mo gone up to four months wants meds filled    Was itchy/jaundiced, LFT's up, downtrending last time checked, itching/jaundice gone, MR liver tmrw, per pt he was told hold off on covid booster till cleared by MR        Past Medical History:   Diagnosis Date     Anemia      Biliary stricture of transplanted liver (H) 2018     BPH (benign prostatic hyperplasia)      Cancer (H)     hepatocellualr carcinoma     Cirrhosis of liver (H) 2018     Diabetes (H)      Enlarged prostate      Hypothyroidism      Impotence of organic origin 2020     Inguinal hernia     Repaired with mesh on 18     Liver lesion 2018     Liver transplanted (H) 2018     donor liver transplant     Portal vein thrombosis     on path explant     Postoperative atrial fibrillation (H) 2018     Prostate cancer (H)      TB lung, latent     Treated      Past Surgical History:   Procedure Laterality Date     APPENDECTOMY       COLONOSCOPY           DAVINCI PROSTATECTOMY N/A 1/3/2020    Procedure: Robot-assisted laparoscopic radical prostatectomy, Bladder biopsy;  Surgeon: Kenrick Casiano MD;  Location: [facility]     ENDOSCOPIC RETROGRADE CHOLANGIOPANCREATOGRAM N/A 2018    Procedure: ENDOSCOPIC RETROGRADE CHOLANGIOPANCREATOGRAM, with Billary Sphincterotomy and Billary Stent Placement;  Surgeon: Omero Lawler MD;  Location: [facility] OR     ENDOSCOPIC RETROGRADE CHOLANGIOPANCREATOGRAM  2019    Procedure: COMBINED ENDOSCOPIC RETROGRADE CHOLANGIOPANCREATOGRAPHY, Bile Duct Stent Exchange;  Surgeon: Omero Lawler MD;   "Location: [facility] OR     ENDOSCOPIC RETROGRADE CHOLANGIOPANCREATOGRAM N/A 2019    Procedure: ENDOSCOPIC RETROGRADE CHOLANGIOPANCREATOGRAPHY, WITH BILIARY STENT REMOVAL AND STONE EXTRACTION;  Surgeon: Omero Lawler MD;  Location: [facility] OR     HERNIORRHAPHY INGUINAL  2018    Procedure: Herniorrhaphy inguinal;  Surgeon: Emil Echevarria MD;  Location: [facility] OR     IR LIVER BIOPSY PERCUTANEOUS  2018     LAPAROTOMY EXPLORATORY      per the patient \"for infection in the LLQ\"      TRANSPLANT LIVER RECIPIENT  DONOR N/A 2018    Procedure: TRANSPLANT LIVER RECIPIENT  DONOR;  Surgeon: Emil Echevarria MD;  Location: [facility] OR     Current Outpatient Medications   Medication     alfuzosin ER (UROXATRAL) 10 MG 24 hr tablet     amLODIPine (NORVASC) 10 MG tablet     aspirin (ASA) 325 MG EC tablet     bisacodyl (DULCOLAX) 10 MG suppository     blood glucose (NO BRAND SPECIFIED) lancets standard     blood glucose (ONETOUCH VERIO IQ) test strip     blood glucose monitoring (NO BRAND SPECIFIED) meter device kit     glucose 40 % (400 mg/mL) gel     insulin glargine (LANTUS PEN) 100 UNIT/ML pen     insulin glargine (LANTUS SOLOSTAR) 100 UNIT/ML pen     insulin pen needle (BD KIRK U/F) 32G X 4 MM miscellaneous     Lancets (ONETOUCH DELICA PLUS [id number]) MISC     levothyroxine (SYNTHROID/LEVOTHROID) 100 MCG tablet     losartan (COZAAR) 50 MG tablet     magnesium hydroxide (MILK OF MAGNESIA) 400 MG/5ML suspension     metFORMIN (GLUCOPHAGE-XR) 500 MG 24 hr tablet     Metoprolol Tartrate 75 MG TABS     mineral oil liquid     Multiple Vitamin (DAILY-STEVE) TABS     mycophenolate (GENERIC EQUIVALENT) 250 MG capsule     polyethylene glycol (MIRALAX) 17 GM/Dose powder     predniSONE (DELTASONE) 5 MG tablet     Sharps Container MISC     sildenafil (VIAGRA) 100 MG tablet     sitagliptin (JANUVIA) 25 MG tablet     ursodiol (ACTIGALL) 250 MG tablet     No current facility-administered medications for this " "visit.     No Known Allergies          Review of Systems         Objective    BP (!) 156/66 (BP Location: Right arm, Patient Position: Sitting, Cuff Size: Adult Regular)   Pulse 71   Resp 16   Ht 1.7 m (5' 6.93\")   Wt 89 kg (196 lb 4.8 oz)   SpO2 95%   BMI 30.81 kg/m    Body mass index is 30.81 kg/m .  Physical Exam   GENERAL: healthy, alert and no distress  NECK: no adenopathy, no asymmetry, masses, or scars and thyroid normal to palpation  RESP: lungs clear to auscultation - no rales, rhonchi or wheezes  CV: regular rate and rhythm, normal S1 S2, no S3 or S4, no murmur, click or rub, no peripheral edema and peripheral pulses strong  ABDOMEN: soft, nontender, no hepatosplenomegaly, no masses and bowel sounds normal  MS: no gross musculoskeletal defects noted, no edema  No jaundice/sclericterus now              "

## 2021-09-16 NOTE — NURSING NOTE
Loy Limon is a 66 year old male patient that presents today in clinic for the following:    Chief Complaint   Patient presents with     RECHECK     Follow-up     The patient's allergies and medications were reviewed as noted. A set of vitals were recorded as noted without incident. The patient does not have any other questions for the provider.    Ag Garza, EMT at 1:08 PM on 9/16/2021

## 2021-09-17 ENCOUNTER — OFFICE VISIT (OUTPATIENT)
Dept: CARDIOLOGY | Facility: CLINIC | Age: 66
End: 2021-09-17
Attending: FAMILY MEDICINE
Payer: COMMERCIAL

## 2021-09-17 ENCOUNTER — HOSPITAL ENCOUNTER (OUTPATIENT)
Dept: MRI IMAGING | Facility: CLINIC | Age: 66
End: 2021-09-17
Attending: INTERNAL MEDICINE
Payer: COMMERCIAL

## 2021-09-17 VITALS
DIASTOLIC BLOOD PRESSURE: 76 MMHG | HEART RATE: 58 BPM | HEIGHT: 67 IN | OXYGEN SATURATION: 98 % | WEIGHT: 195 LBS | SYSTOLIC BLOOD PRESSURE: 155 MMHG | BODY MASS INDEX: 30.61 KG/M2

## 2021-09-17 DIAGNOSIS — R07.9 EXERTIONAL CHEST PAIN: ICD-10-CM

## 2021-09-17 DIAGNOSIS — R79.89 ABNORMAL LIVER FUNCTION TEST: ICD-10-CM

## 2021-09-17 DIAGNOSIS — R00.2 PALPITATIONS: Primary | ICD-10-CM

## 2021-09-17 LAB
ATRIAL RATE - MUSE: 55 BPM
DIASTOLIC BLOOD PRESSURE - MUSE: NORMAL MMHG
INTERPRETATION ECG - MUSE: NORMAL
P AXIS - MUSE: 46 DEGREES
PR INTERVAL - MUSE: 176 MS
QRS DURATION - MUSE: 88 MS
QT - MUSE: 448 MS
QTC - MUSE: 428 MS
R AXIS - MUSE: 0 DEGREES
SYSTOLIC BLOOD PRESSURE - MUSE: NORMAL MMHG
T AXIS - MUSE: 148 DEGREES
VENTRICULAR RATE- MUSE: 55 BPM

## 2021-09-17 PROCEDURE — 255N000002 HC RX 255 OP 636: Performed by: INTERNAL MEDICINE

## 2021-09-17 PROCEDURE — A9585 GADOBUTROL INJECTION: HCPCS | Performed by: INTERNAL MEDICINE

## 2021-09-17 PROCEDURE — 74183 MRI ABD W/O CNTR FLWD CNTR: CPT | Mod: 26 | Performed by: RADIOLOGY

## 2021-09-17 PROCEDURE — G0463 HOSPITAL OUTPT CLINIC VISIT: HCPCS | Mod: 25

## 2021-09-17 PROCEDURE — 93005 ELECTROCARDIOGRAM TRACING: CPT

## 2021-09-17 PROCEDURE — 74183 MRI ABD W/O CNTR FLWD CNTR: CPT

## 2021-09-17 PROCEDURE — 99205 OFFICE O/P NEW HI 60 MIN: CPT | Performed by: STUDENT IN AN ORGANIZED HEALTH CARE EDUCATION/TRAINING PROGRAM

## 2021-09-17 RX ORDER — SODIUM CHLORIDE 9 MG/ML
INJECTION, SOLUTION INTRAVENOUS CONTINUOUS
Status: CANCELLED | OUTPATIENT
Start: 2021-09-17

## 2021-09-17 RX ORDER — ASPIRIN 81 MG/1
243 TABLET, CHEWABLE ORAL ONCE
Status: CANCELLED | OUTPATIENT
Start: 2021-09-17

## 2021-09-17 RX ORDER — GADOBUTROL 604.72 MG/ML
10 INJECTION INTRAVENOUS ONCE
Status: COMPLETED | OUTPATIENT
Start: 2021-09-17 | End: 2021-09-17

## 2021-09-17 RX ORDER — POTASSIUM CHLORIDE 1500 MG/1
40 TABLET, EXTENDED RELEASE ORAL
Status: CANCELLED | OUTPATIENT
Start: 2021-09-17

## 2021-09-17 RX ORDER — POTASSIUM CHLORIDE 1500 MG/1
20 TABLET, EXTENDED RELEASE ORAL
Status: CANCELLED | OUTPATIENT
Start: 2021-09-17

## 2021-09-17 RX ORDER — LIDOCAINE 40 MG/G
CREAM TOPICAL
Status: CANCELLED | OUTPATIENT
Start: 2021-09-17

## 2021-09-17 RX ORDER — ASPIRIN 325 MG
325 TABLET ORAL ONCE
Status: CANCELLED | OUTPATIENT
Start: 2021-09-17 | End: 2021-09-17

## 2021-09-17 RX ADMIN — GADOBUTROL 8.8 ML: 604.72 INJECTION INTRAVENOUS at 18:12

## 2021-09-17 ASSESSMENT — PAIN SCALES - GENERAL: PAINLEVEL: NO PAIN (0)

## 2021-09-17 ASSESSMENT — MIFFLIN-ST. JEOR: SCORE: 1614.52

## 2021-09-17 NOTE — PROGRESS NOTES
Chief complaint: Shortness of breath    HPI:   Loy Limon is a 66 year old male with history of HCC status post liver transplant on 2018, hypertension hyperlipidemia and prior history of smoking who presents for evaluation of shortness of breath.     Patient reports he underwent liver transplant on 2018 and has been doing better since then, his lower extremity edema has improved.   Patient reports shortness of breath while walking on the park, prior to the episode he was able to walk without limitations. He reports chest tightness when he needs to cough. The first episode of shortness of breath occurred around three months. He reports that since his first episode he is feeling shortness of breath while walking fast but now he has slowed down to the concern about his shortness of breath.   His brother had prior CAB that was done on his 80s.     The patient's risk factor profile is: (+) HTN, (+) diabetes, (+) hyperlipidemia, (+) prior tobacco use, (-) family Hx CAD.     ECG today shows: Sinus bradycardia with 1 mm ST depressions in the anterolateral leads.     He denies any PND, orthopnea, peripheral edema, palpitations, lightheadedness or syncope.       PAST MEDICAL HISTORY:  Past Medical History:   Diagnosis Date     Anemia      Biliary stricture of transplanted liver (H) 2018     BPH (benign prostatic hyperplasia)      Cancer (H)     hepatocellualr carcinoma     Cirrhosis of liver (H) 2018     Diabetes (H)      Enlarged prostate      Hypothyroidism      Impotence of organic origin 2020     Inguinal hernia     Repaired with mesh on 18     Liver lesion 2018     Liver transplanted (H) 2018     donor liver transplant     Portal vein thrombosis     on path explant     Postoperative atrial fibrillation (H) 2018     Prostate cancer (H)      TB lung, latent     Treated        CURRENT MEDICATIONS:  Current Outpatient Medications   Medication Sig Dispense  Refill     alfuzosin ER (UROXATRAL) 10 MG 24 hr tablet        amLODIPine (NORVASC) 10 MG tablet Take 1 tablet (10 mg) by mouth daily 90 tablet 3     aspirin (ASA) 325 MG EC tablet Take 1 tablet (325 mg) by mouth daily 90 tablet 3     bisacodyl (DULCOLAX) 10 MG suppository Place 1 suppository (10 mg) rectally daily For constipation. Stop if diarrhea occurs. 10 suppository 1     blood glucose (NO BRAND SPECIFIED) lancets standard Use to test blood sugar 4 times daily or as directed. 400 each 2     blood glucose (ONETOUCH VERIO IQ) test strip Use to test blood sugar 2 times daily or as directed. 200 strip 11     blood glucose monitoring (NO BRAND SPECIFIED) meter device kit Use to test blood sugar 2 times daily or as directed. Please let the pt know when it is ready to . 1 kit 0     glucose 40 % (400 mg/mL) gel Take 15-30 g by mouth every 15 minutes as needed for low blood sugar 30 g 3     insulin glargine (LANTUS PEN) 100 UNIT/ML pen Take 10 units in the morning and 10 units in the evening 15 mL 3     insulin glargine (LANTUS SOLOSTAR) 100 UNIT/ML pen 12 Units Inject 14 units subcutaneous daily. 15 mL 1     insulin pen needle (BD KIRK U/F) 32G X 4 MM miscellaneous Use 1 daily. 200 each 3     Lancets (ONETOUCH DELICA PLUS [id number]) MISC U TO TEST QID OR UTD       levothyroxine (SYNTHROID/LEVOTHROID) 100 MCG tablet Take 1 tablet (100 mcg) by mouth daily 90 tablet 3     losartan (COZAAR) 50 MG tablet Take 1 tablet (50 mg) by mouth daily 30 tablet 11     magnesium hydroxide (MILK OF MAGNESIA) 400 MG/5ML suspension Take 30 mLs by mouth daily as needed for constipation or heartburn (Stop if diarrhea occurs) 355 mL 1     metFORMIN (GLUCOPHAGE-XR) 500 MG 24 hr tablet Take 2 tablets (1,000 mg) by mouth 2 times daily (with meals) 360 tablet 1     Metoprolol Tartrate 75 MG TABS Take 1 tablet by mouth 2 times daily 180 tablet 3     mineral oil liquid Take 30 mLs by mouth daily For constipation. Stop if diarrhea occurs.  "500 mL 1     Multiple Vitamin (DAILY-STEVE) TABS Take 1 tablet by mouth daily 100 tablet 3     mycophenolate (GENERIC EQUIVALENT) 250 MG capsule Take 3 capsules (750 mg) by mouth 2 times daily 540 capsule 3     polyethylene glycol (MIRALAX) 17 GM/Dose powder Take 17 g (1 capful) by mouth daily 507 g 3     predniSONE (DELTASONE) 5 MG tablet Take 1 tablet (5 mg) by mouth daily 90 tablet 3     Sharps Container MISC 1 each daily 1 each 2     sildenafil (VIAGRA) 100 MG tablet Take 1/2 to 1 full pill by mouth 1/2 hour before sexual activity 10 tablet 3     sitagliptin (JANUVIA) 25 MG tablet Take 1 tablet (25 mg) by mouth daily 90 tablet 1     ursodiol (ACTIGALL) 250 MG tablet Take 1 tablet (250 mg) by mouth 2 times daily 180 tablet 3       PAST SURGICAL HISTORY:  Past Surgical History:   Procedure Laterality Date     APPENDECTOMY       COLONOSCOPY      2018     DAVINCI PROSTATECTOMY N/A 1/3/2020    Procedure: Robot-assisted laparoscopic radical prostatectomy, Bladder biopsy;  Surgeon: Kenrick Casiano MD;  [facility]: [facility]     ENDOSCOPIC RETROGRADE CHOLANGIOPANCREATOGRAM N/A 12/28/2018    Procedure: ENDOSCOPIC RETROGRADE CHOLANGIOPANCREATOGRAM, with Billary Sphincterotomy and Billary Stent Placement;  Surgeon: Omero Lawler MD;  [facility]     ENDOSCOPIC RETROGRADE CHOLANGIOPANCREATOGRAM  2/18/2019    Procedure: COMBINED ENDOSCOPIC RETROGRADE CHOLANGIOPANCREATOGRAPHY, Bile Duct Stent Exchange;  Surgeon: Omero Lawler MD;  [facility]     ENDOSCOPIC RETROGRADE CHOLANGIOPANCREATOGRAM N/A 4/29/2019    Procedure: ENDOSCOPIC RETROGRADE CHOLANGIOPANCREATOGRAPHY, WITH BILIARY STENT REMOVAL AND STONE EXTRACTION;  Surgeon: Omero Lawler MD;  [facility]     HERNIORRHAPHY INGUINAL  12/22/2018    Procedure: Herniorrhaphy inguinal;  Surgeon: Emil Echevarria MD;  [facility]     IR LIVER BIOPSY PERCUTANEOUS  12/31/2018     LAPAROTOMY EXPLORATORY      per the patient \"for " "infection in the LLQ\"      TRANSPLANT LIVER RECIPIENT  DONOR N/A 2018    Procedure: TRANSPLANT LIVER RECIPIENT  DONOR;  Surgeon: Emil Echevarria MD;  Location:  OR       ALLERGIES:   No Known Allergies    FAMILY HISTORY:  Family History   Problem Relation Age of Onset     Memory loss Mother      Liver Disease Brother      Skin Cancer No family hx of      Melanoma No family hx of      No family history of premature CAD or sudden death.    SOCIAL HISTORY:  Social History     Tobacco Use     Smoking status: Former Smoker     Packs/day: 0.03     Years: 20.00     Pack years: 0.60     Types: Cigarettes, Cigars     Start date: 1970     Quit date: 3/14/2018     Years since quitting: 3.5     Smokeless tobacco: Never Used     Tobacco comment: Quit smoking 2018, one cigarette after dinner   Substance Use Topics     Alcohol use: No     Comment: Quit drinking 2018     Drug use: No       ROS:   A comprehensive 14 point review of systems is negative other than as mentioned in HPI.    Exam:  BP (!) 155/76 (BP Location: Right arm, Patient Position: Chair, Cuff Size: Adult Regular)   Pulse 58   Ht 1.704 m (5' 7.09\")   Wt 88.5 kg (195 lb)   SpO2 98%   BMI 30.46 kg/m    GENERAL APPEARANCE: healthy, alert and no distress  EYES: no icterus, no xanthelasmas  ENT: normal palate, mucosa moist, no central cyanosis  NECK: JVP not elevated  RESPIRATORY: lungs clear to auscultation - no rales, rhonchi or wheezes, no use of accessory muscles, no retractions, respirations are unlabored, normal respiratory rate  CARDIOVASCULAR: regular rhythm, normal S1 with physiologic split S2, no S3 or S4 and no murmur, click or rub.  GI: soft, non tender, bowel sounds normal,no abdominal bruits  EXTREMITIES: no edema, no bruits  NEURO: alert and oriented to person/place/time, normal speech, gait and affect  VASC: Radial, dorsalis pedis and posterior tibialis pulses 2+ bilaterally.  SKIN: no ecchymoses, no " roula.  PSYCH: cooperative, affect appropriate.     Labs:  Reviewed.     Testing/Procedures:  I personally visualized and interpreted:  Stress echocardiogram from 7/7/2021 with submaximal heart rate of 71% and also stopped due to chest pain.     Assessment and Plan:   Loy Limon is a 66 year old male with history of HCC status post liver transplant on 12/20/2018, hypertension hyperlipidemia and prior history of smoking who presents for evaluation of shortness of breath.     Patient with shortness of breath and chest tightness that is reproducible with exercise, has a started while he is walking in the park, since then he has walks slower or avoid walking.  He does have significant risk factors for CAD including diabetes mellitus type 2, prior history of smoking for about 20 years, he quit smoking about 3 years ago, hyperlipidemia.  Given those risk factors and abnormal stress echocardiogram was submaximal heart rate but more importantly chest pain with exertion I would recommend further evaluation with invasive coronary angiogram.  He does have normal platelet counts and no other significant complications of the procedure.  I discussed the risks and benefits and patient is agreeable to proceed with the.   -Given his significant risk factors and moderate coronary artery calcium on CT chest he should be on a statin but given his liver is contraindicated I would recommend PCSK9 inhibitor for him, will discuss with the liver team to see they would be okay with.     Chart review time: 30 minutes  Visit time: 30 minutes  Total time spent: 60 minutes  >50% of this 30-minute visit were spent with the patient on in-person counseling and discussion regarding chest pain, diagnostic procedures including invasive coronary angiogram.     The patient states understanding and is agreeable with plan.     Theron Nicholas MD  Cardiology    CC  [facility]

## 2021-09-17 NOTE — LETTER
9/17/2021      RE: Loy Limon  [address]  [facility] [address]       Dear Colleague,    Thank you for the opportunity to participate in the care of your patient, Loy Limon, at the [facility] [place] at [facility]. Please see a copy of my visit note below.    Chief complaint: Shortness of breath    HPI:   Loy Limon is a 66 year old male with history of HCC status post liver transplant on 12/20/2018, hypertension hyperlipidemia and prior history of smoking who presents for evaluation of shortness of breath.     Patient reports he underwent liver transplant on 12/2018 and has been doing better since then, his lower extremity edema has improved.   Patient reports shortness of breath while walking on the park, prior to the episode he was able to walk without limitations. He reports chest tightness when he needs to cough. The first episode of shortness of breath occurred around three months. He reports that since his first episode he is feeling shortness of breath while walking fast but now he has slowed down to the concern about his shortness of breath.   His brother had prior CAB that was done on his 80s.     The patient's risk factor profile is: (+) HTN, (+) diabetes, (+) hyperlipidemia, (+) prior tobacco use, (-) family Hx CAD.     ECG today shows: Sinus bradycardia with 1 mm ST depressions in the anterolateral leads.     He denies any PND, orthopnea, peripheral edema, palpitations, lightheadedness or syncope.       PAST MEDICAL HISTORY:  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/28/2018     BPH (benign prostatic hyperplasia)      Cancer (H)     hepatocellualr carcinoma     Cirrhosis of liver (H) 05/08/2018     Diabetes (H)      Enlarged prostate      Hypothyroidism      Impotence of organic origin 09/25/2020     Inguinal hernia     Repaired with mesh  on 18     Liver lesion 2018     Liver transplanted (H) 2018     donor liver transplant     Portal vein thrombosis     on path explant     Postoperative atrial fibrillation (H) 2018     Prostate cancer (H)      TB lung, latent     Treated        CURRENT MEDICATIONS:  Current Outpatient Medications   Medication Sig Dispense Refill     alfuzosin ER (UROXATRAL) 10 MG 24 hr tablet        amLODIPine (NORVASC) 10 MG tablet Take 1 tablet (10 mg) by mouth daily 90 tablet 3     aspirin (ASA) 325 MG EC tablet Take 1 tablet (325 mg) by mouth daily 90 tablet 3     bisacodyl (DULCOLAX) 10 MG suppository Place 1 suppository (10 mg) rectally daily For constipation. Stop if diarrhea occurs. 10 suppository 1     blood glucose (NO BRAND SPECIFIED) lancets standard Use to test blood sugar 4 times daily or as directed. 400 each 2     blood glucose (ONETOUCH VERIO IQ) test strip Use to test blood sugar 2 times daily or as directed. 200 strip 11     blood glucose monitoring (NO BRAND SPECIFIED) meter device kit Use to test blood sugar 2 times daily or as directed. Please let the pt know when it is ready to . 1 kit 0     glucose 40 % (400 mg/mL) gel Take 15-30 g by mouth every 15 minutes as needed for low blood sugar 30 g 3     insulin glargine (LANTUS PEN) 100 UNIT/ML pen Take 10 units in the morning and 10 units in the evening 15 mL 3     insulin glargine (LANTUS SOLOSTAR) 100 UNIT/ML pen 12 Units Inject 14 units subcutaneous daily. 15 mL 1     insulin pen needle (BD KIRK U/F) 32G X 4 MM miscellaneous Use 1 daily. 200 each 3     Lancets (ONETOUCH DELICA PLUS [id number]) MISC U TO TEST QID OR UTD       levothyroxine (SYNTHROID/LEVOTHROID) 100 MCG tablet Take 1 tablet (100 mcg) by mouth daily 90 tablet 3     losartan (COZAAR) 50 MG tablet Take 1 tablet (50 mg) by mouth daily 30 tablet 11     magnesium hydroxide (MILK OF MAGNESIA) 400 MG/5ML suspension Take 30 mLs by mouth daily as needed for  constipation or heartburn (Stop if diarrhea occurs) 355 mL 1     metFORMIN (GLUCOPHAGE-XR) 500 MG 24 hr tablet Take 2 tablets (1,000 mg) by mouth 2 times daily (with meals) 360 tablet 1     Metoprolol Tartrate 75 MG TABS Take 1 tablet by mouth 2 times daily 180 tablet 3     mineral oil liquid Take 30 mLs by mouth daily For constipation. Stop if diarrhea occurs. 500 mL 1     Multiple Vitamin (DAILY-STEVE) TABS Take 1 tablet by mouth daily 100 tablet 3     mycophenolate (GENERIC EQUIVALENT) 250 MG capsule Take 3 capsules (750 mg) by mouth 2 times daily 540 capsule 3     polyethylene glycol (MIRALAX) 17 GM/Dose powder Take 17 g (1 capful) by mouth daily 507 g 3     predniSONE (DELTASONE) 5 MG tablet Take 1 tablet (5 mg) by mouth daily 90 tablet 3     Sharps Container MISC 1 each daily 1 each 2     sildenafil (VIAGRA) 100 MG tablet Take 1/2 to 1 full pill by mouth 1/2 hour before sexual activity 10 tablet 3     sitagliptin (JANUVIA) 25 MG tablet Take 1 tablet (25 mg) by mouth daily 90 tablet 1     ursodiol (ACTIGALL) 250 MG tablet Take 1 tablet (250 mg) by mouth 2 times daily 180 tablet 3       PAST SURGICAL HISTORY:  Past Surgical History:   Procedure Laterality Date     APPENDECTOMY       COLONOSCOPY      2018     DAVINCI PROSTATECTOMY N/A 1/3/2020    Procedure: Robot-assisted laparoscopic radical prostatectomy, Bladder biopsy;  Surgeon: Kenrick Casiano MD;  Location:  OR     ENDOSCOPIC RETROGRADE CHOLANGIOPANCREATOGRAM N/A 12/28/2018    Procedure: ENDOSCOPIC RETROGRADE CHOLANGIOPANCREATOGRAM, with Billary Sphincterotomy and Billary Stent Placement;  Surgeon: Omero Lawler MD;  [facility]     ENDOSCOPIC RETROGRADE CHOLANGIOPANCREATOGRAM  2/18/2019    Procedure: COMBINED ENDOSCOPIC RETROGRADE CHOLANGIOPANCREATOGRAPHY, Bile Duct Stent Exchange;  Surgeon: Omero Lawler MD;  [facility]     ENDOSCOPIC RETROGRADE CHOLANGIOPANCREATOGRAM N/A 4/29/2019    Procedure: ENDOSCOPIC  "RETROGRADE CHOLANGIOPANCREATOGRAPHY, WITH BILIARY STENT REMOVAL AND STONE EXTRACTION;  Surgeon: Omero Lawler MD;  [facility]     HERNIORRHAPHY INGUINAL  2018    Procedure: Herniorrhaphy inguinal;  Surgeon: Emil Echevarria MD;  [facility]     IR LIVER BIOPSY PERCUTANEOUS  2018     LAPAROTOMY EXPLORATORY      per the patient \"for infection in the LLQ\"      TRANSPLANT LIVER RECIPIENT  DONOR N/A 2018    Procedure: TRANSPLANT LIVER RECIPIENT  DONOR;  Surgeon: Emil Echevarria MD;  [facility]       ALLERGIES:   No Known Allergies    FAMILY HISTORY:  Family History   Problem Relation Age of Onset     Memory loss Mother      Liver Disease Brother      Skin Cancer No family hx of      Melanoma No family hx of      No family history of premature CAD or sudden death.    SOCIAL HISTORY:  Social History     Tobacco Use     Smoking status: Former Smoker     Packs/day: 0.03     Years: 20.00     Pack years: 0.60     Types: Cigarettes, Cigars     Start date: 1970     Quit date: 3/14/2018     Years since quitting: 3.5     Smokeless tobacco: Never Used     Tobacco comment: Quit smoking 2018, one cigarette after dinner   Substance Use Topics     Alcohol use: No     Comment: Quit drinking 2018     Drug use: No       ROS:   A comprehensive 14 point review of systems is negative other than as mentioned in HPI.    Exam:  BP (!) 155/76 (BP Location: Right arm, Patient Position: Chair, Cuff Size: Adult Regular)   Pulse 58   Ht 1.704 m (5' 7.09\")   Wt 88.5 kg (195 lb)   SpO2 98%   BMI 30.46 kg/m    GENERAL APPEARANCE: healthy, alert and no distress  EYES: no icterus, no xanthelasmas  ENT: normal palate, mucosa moist, no central cyanosis  NECK: JVP not elevated  RESPIRATORY: lungs clear to auscultation - no rales, rhonchi or wheezes, no use of accessory muscles, no retractions, respirations are unlabored, normal respiratory rate  CARDIOVASCULAR: regular " rhythm, normal S1 with physiologic split S2, no S3 or S4 and no murmur, click or rub.  GI: soft, non tender, bowel sounds normal,no abdominal bruits  EXTREMITIES: no edema, no bruits  NEURO: alert and oriented to person/place/time, normal speech, gait and affect  VASC: Radial, dorsalis pedis and posterior tibialis pulses 2+ bilaterally.  SKIN: no ecchymoses, no rashes.  PSYCH: cooperative, affect appropriate.     Labs:  Reviewed.     Testing/Procedures:  I personally visualized and interpreted:  Stress echocardiogram from 7/7/2021 with submaximal heart rate of 71% and also stopped due to chest pain.     Assessment and Plan:   Loy Limon is a 66 year old male with history of HCC status post liver transplant on 12/20/2018, hypertension hyperlipidemia and prior history of smoking who presents for evaluation of shortness of breath.     Patient with shortness of breath and chest tightness that is reproducible with exercise, has a started while he is walking in the park, since then he has walks slower or avoid walking.  He does have significant risk factors for CAD including diabetes mellitus type 2, prior history of smoking for about 20 years, he quit smoking about 3 years ago, hyperlipidemia.  Given those risk factors and abnormal stress echocardiogram was submaximal heart rate but more importantly chest pain with exertion I would recommend further evaluation with invasive coronary angiogram.  He does have normal platelet counts and no other significant complications of the procedure.  I discussed the risks and benefits and patient is agreeable to proceed with the.   -Given his significant risk factors and moderate coronary artery calcium on CT chest he should be on a statin but given his liver is contraindicated I would recommend PCSK9 inhibitor for him, will discuss with the liver team to see they would be okay with.     Chart review time: 30 minutes  Visit time: 30 minutes  Total time spent: 60 minutes  >50%  of this 30-minute visit were spent with the patient on in-person counseling and discussion regarding chest pain, diagnostic procedures including invasive coronary angiogram.     The patient states understanding and is agreeable with plan.     Theron Nicholas MD  Cardiology    CC  [facility]

## 2021-09-17 NOTE — PATIENT INSTRUCTIONS
You were seen today in the Cardiovascular Clinic at the [facility].      Cardiology Providers you saw during your visit:  Dr. Theron Nicholas    Recommendations:    Coronary angiogram    Follow up:    --Will be determined based on results of the coronary angiogram    ------------------------------------------------------------------------------------------------------------  You will be scheduled for a Coronary Angiogram at the [facility], [address] [place] [address]. You are to check in at the [facility] Waiting Area.     Pre procedure instructions:  1. Please make arrangements to have a  as you will not be allowed to drive following the procedure.  2. Do not eat or drink after midnight the day of your procedure.  Please HOLD your metformin the morning of your procedure.  Please HOLD short acting insulins the morning of your procedure.  Please take HALF your morning dose of Lantus.  3. You may take your usual morning medications with a sip of water the morning of your procedure.  4. Take a 81 mg aspirin the day before and the day of your procedure.  5. Make arrangements to have someone stay with you the night after your procedure.  6.  You will need COVID testing prior to your procedure.  You will be contacted by the COVID testing team.      Please call me if you have questions regarding these instructions or your scheduled procedure.  _____________________________________________________________    You will be escorted back to the pre procedure area called 2A. Here they will insert an IV, draw labs, and obtain a short medical history. Please bring an updated list of your current medications.     A physician will come and talk with you about the procedure and obtain consent.     A nurse from the Cardiac Catheterization Lab will then escort you to the procedure area. You will be receiving sedation during the procedure so you will need someone to drive  you to and from the hospital.     After the procedure you will recover for a short period (2 - 6 hours). You will be discharged with instructions for post procedure care.     Depending on what the angiogram shows, you may have stents placed to open tight narrowings in your coronary arteries.      ---------------------------------------------------------------------------------------------------------    Nursing questions:  [phone], opt. 4  For emergencies call 911.    It was a pleasure to see you in clinic.  If you have any questions at all, please feel free to give me a call.      Olamide Malloy, RN  Structural Heart Care Coordinator   TAVR and MitraClip Programs  [facility]  Office: [phone]    [facility]  [address] [place] [address]  Cardiology Clinic [id number]  [place] [address]

## 2021-09-19 DIAGNOSIS — Z11.59 ENCOUNTER FOR SCREENING FOR OTHER VIRAL DISEASES: ICD-10-CM

## 2021-09-20 ENCOUNTER — TELEPHONE (OUTPATIENT)
Dept: TRANSPLANT | Facility: CLINIC | Age: 66
End: 2021-09-20

## 2021-09-20 DIAGNOSIS — R79.89 ABNORMAL LIVER FUNCTION TESTS: Primary | ICD-10-CM

## 2021-09-20 DIAGNOSIS — Z94.4 LIVER TRANSPLANTED (H): ICD-10-CM

## 2021-09-20 NOTE — TELEPHONE ENCOUNTER
Reviewed recent labs w/ Dr. Carballo.  LIver tests were a bit better last week, MRCP was negative.  Dr. Carballo would like to assure that Claribel is taking his medications as ordered, if he is we would like him to repeat his labs this week.  Claribel confirmed that he is taking prednisone 5 mg daily and mycophenolate 750 mg po bid.  He is not missing doses.  I explained that his MRI did not look abnormal.  I asked him to have liver tests 1 more time this week.  If abnormal we will have him get a liver biopsy.  He understands.  He will have lab tests drawn tomorrow.  He is going to [place] on the 23rd.  He is feeling better.   Order placed, lab appt made for 10:30 pm tomorrow.  Claribel will also get COVID booster tomorrow.

## 2021-09-21 ENCOUNTER — LAB (OUTPATIENT)
Dept: LAB | Facility: CLINIC | Age: 66
End: 2021-09-21

## 2021-09-21 ENCOUNTER — TELEPHONE (OUTPATIENT)
Dept: TRANSPLANT | Facility: CLINIC | Age: 66
End: 2021-09-21

## 2021-09-21 ENCOUNTER — OFFICE VISIT (OUTPATIENT)
Dept: ENDOCRINOLOGY | Facility: CLINIC | Age: 66
End: 2021-09-21
Payer: COMMERCIAL

## 2021-09-21 VITALS
HEIGHT: 68 IN | SYSTOLIC BLOOD PRESSURE: 133 MMHG | HEART RATE: 60 BPM | WEIGHT: 196.2 LBS | BODY MASS INDEX: 29.73 KG/M2 | DIASTOLIC BLOOD PRESSURE: 71 MMHG

## 2021-09-21 DIAGNOSIS — Z94.4 LIVER TRANSPLANT RECIPIENT (H): Primary | ICD-10-CM

## 2021-09-21 DIAGNOSIS — D84.9 IMMUNOSUPPRESSION (H): ICD-10-CM

## 2021-09-21 DIAGNOSIS — Z94.4 LIVER TRANSPLANTED (H): ICD-10-CM

## 2021-09-21 DIAGNOSIS — R79.89 ABNORMAL LIVER FUNCTION TESTS: ICD-10-CM

## 2021-09-21 DIAGNOSIS — G63 POLYNEUROPATHY ASSOCIATED WITH UNDERLYING DISEASE (H): Primary | ICD-10-CM

## 2021-09-21 DIAGNOSIS — E11.65 UNCONTROLLED TYPE 2 DIABETES MELLITUS WITH HYPERGLYCEMIA (H): ICD-10-CM

## 2021-09-21 LAB
ALBUMIN SERPL-MCNC: 2.9 G/DL (ref 3.4–5)
ALP SERPL-CCNC: 428 U/L (ref 40–150)
ALT SERPL W P-5'-P-CCNC: 84 U/L (ref 0–70)
AST SERPL W P-5'-P-CCNC: 35 U/L (ref 0–45)
BILIRUB DIRECT SERPL-MCNC: 0.6 MG/DL (ref 0–0.2)
BILIRUB SERPL-MCNC: 1.4 MG/DL (ref 0.2–1.3)
PROT SERPL-MCNC: 6.7 G/DL (ref 6.8–8.8)

## 2021-09-21 PROCEDURE — 80076 HEPATIC FUNCTION PANEL: CPT | Performed by: PATHOLOGY

## 2021-09-21 PROCEDURE — 99215 OFFICE O/P EST HI 40 MIN: CPT | Performed by: PHYSICIAN ASSISTANT

## 2021-09-21 PROCEDURE — 36415 COLL VENOUS BLD VENIPUNCTURE: CPT | Performed by: PATHOLOGY

## 2021-09-21 ASSESSMENT — MIFFLIN-ST. JEOR: SCORE: 1634.46

## 2021-09-21 ASSESSMENT — PAIN SCALES - GENERAL: PAINLEVEL: NO PAIN (0)

## 2021-09-21 NOTE — PATIENT INSTRUCTIONS
Estimado Don Limon,  Sigue samaniego buen cuidado de samaniego dieta.  Cuando vuelve hablamos de otro clase de medicamentos, GLP1-agonist que es dyana injeccion dyana vez a la semana.  Cuidase mucho!  My best wishes,    Leia Edward PA-C, MPAS  [facility]  Diabetes, Endocrinology, and Metabolism  [phone] Appointments/Nurse  [phone] Fax  [phone] URGENTafter hours/weekend Endocrinologist on call

## 2021-09-21 NOTE — LETTER
9/21/2021       RE: Loy Limon  [address]  [facility] [address]     Dear Colleague,    Thank you for referring your patient, Loy Limon, to the [facility] ENDOCRINOLOGY [facility] [place] at [facility]. Please see a copy of my visit note below.    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  He was last seen in clinic 1 year ago by Dr. Sue Tavares, but has also been working with Marcy Wolf and last saw her in August.    He has history of cirrhosis with HCC, portal HTN, latent TB and underwent living donor liver transplant on 12/22/2018. He developed steroid/immunosuppressant induced diabetes and was treated with NPH insulin, which was discontinued once he was no longer taking steroids.    He last saw Marcy in August, he was taking Metformin 1000 mg twice daily, Januvia 25 mg daily, Lantus 10 units 2x daily. His average blood sugar was 257 without any hypoglycemia and a GMI of 9.5 we will increase his Lantus to 15 units in the morning and 15 in the evening and discussed how he might reduce his carbohydrate intake.    The patient's risk factor profile is: (+) HTN, (+) diabetes, (+) hyperlipidemia, (+) prior tobacco use, (-) family Hx CAD.     Interim:  He recently saw cardiology to evaluate exertional chest pain and they are recommending PCSK9 inhibitor for him.  He is to see nephrology for his stage II chronic kidney disease with persistent proteinuria.    Today:  A1C 7.9  Mr. Limon is very worried about his diabetes and has been working to control his diabetes.  He awakes in am and reports  or similar.  He quit eating all tortillas and bread.  Eats whole foods, salads, meat with beans, potatoes,   a  bit of rice, cheese, many different vegetables.  He mixes his plate with ranch and it helps all the veggies go down.  Avoids sweets.  He does eat one piece of candy every other day, not every day.    No known       DM med regimen:  Metformin 2000 mg   Januvia 25 mg every day  Lantus 10 units qhs only, unless high (>140 in the morning) then puts bid      BG monitoring:  Am :  100 - 120.  7 pm: 180, 190 - then takes medications    Medications:   Current Outpatient Medications   Medication Sig Dispense Refill     alfuzosin ER (UROXATRAL) 10 MG 24 hr tablet  (Patient not taking: Reported on 9/17/2021)       amLODIPine (NORVASC) 10 MG tablet Take 1 tablet (10 mg) by mouth daily 90 tablet 3     aspirin (ASA) 325 MG EC tablet Take 1 tablet (325 mg) by mouth daily 90 tablet 3     bisacodyl (DULCOLAX) 10 MG suppository Place 1 suppository (10 mg) rectally daily For constipation. Stop if diarrhea occurs. (Patient not taking: Reported on 9/17/2021) 10 suppository 1     blood glucose (NO BRAND SPECIFIED) lancets standard Use to test blood sugar 4 times daily or as directed. 400 each 2     blood glucose (ONETOUCH VERIO IQ) test strip Use to test blood sugar 2 times daily or as directed. 200 strip 11     blood glucose monitoring (NO BRAND SPECIFIED) meter device kit Use to test blood sugar 2 times daily or as directed. Please let the pt know when it is ready to . 1 kit 0     glucose 40 % (400 mg/mL) gel Take 15-30 g by mouth every 15 minutes as needed for low blood sugar 30 g 3     insulin glargine (LANTUS PEN) 100 UNIT/ML pen Take 10 units in the morning and 10 units in the evening 15 mL 3     insulin glargine (LANTUS SOLOSTAR) 100 UNIT/ML pen 12 Units Inject 14 units subcutaneous daily. 15 mL 1     insulin pen needle (BD KIRK U/F) 32G X 4 MM miscellaneous Use 1 daily. 200 each 3     Lancets (ONETOUCH DELICA PLUS [id number]) MISC U TO TEST QID OR UTD       levothyroxine (SYNTHROID/LEVOTHROID) 100 MCG tablet Take 1 tablet  (100 mcg) by mouth daily 90 tablet 3     losartan (COZAAR) 50 MG tablet Take 1 tablet (50 mg) by mouth daily 30 tablet 11     magnesium hydroxide (MILK OF MAGNESIA) 400 MG/5ML suspension Take 30 mLs by mouth daily as needed for constipation or heartburn (Stop if diarrhea occurs) (Patient not taking: Reported on 9/17/2021) 355 mL 1     metFORMIN (GLUCOPHAGE-XR) 500 MG 24 hr tablet Take 2 tablets (1,000 mg) by mouth 2 times daily (with meals) 360 tablet 1     Metoprolol Tartrate 75 MG TABS Take 1 tablet by mouth 2 times daily 180 tablet 3     mineral oil liquid Take 30 mLs by mouth daily For constipation. Stop if diarrhea occurs. (Patient not taking: Reported on 9/17/2021) 500 mL 1     Multiple Vitamin (DAILY-STEVE) TABS Take 1 tablet by mouth daily (Patient not taking: Reported on 9/17/2021) 100 tablet 3     mycophenolate (GENERIC EQUIVALENT) 250 MG capsule Take 3 capsules (750 mg) by mouth 2 times daily (Patient not taking: Reported on 9/17/2021) 540 capsule 3     polyethylene glycol (MIRALAX) 17 GM/Dose powder Take 17 g (1 capful) by mouth daily (Patient not taking: Reported on 9/17/2021) 507 g 3     predniSONE (DELTASONE) 5 MG tablet Take 1 tablet (5 mg) by mouth daily 90 tablet 3     Sharps Container MISC 1 each daily 1 each 2     sildenafil (VIAGRA) 100 MG tablet Take 1/2 to 1 full pill by mouth 1/2 hour before sexual activity (Patient not taking: Reported on 9/17/2021) 10 tablet 3     sitagliptin (JANUVIA) 25 MG tablet Take 1 tablet (25 mg) by mouth daily 90 tablet 1     ursodiol (ACTIGALL) 250 MG tablet Take 1 tablet (250 mg) by mouth 2 times daily 180 tablet 3       Social History     Tobacco Use     Smoking status: Former Smoker     Packs/day: 0.03     Years: 20.00     Pack years: 0.60     Types: Cigarettes, Cigars     Start date: 8/14/1970     Quit date: 3/14/2018     Years since quitting: 3.5     Smokeless tobacco: Never Used     Tobacco comment: Quit smoking Feb 2018, one cigarette after dinner   Substance  "Use Topics     Alcohol use: No     Comment: Quit drinking Feb 2018       Review of Systems:   A comprehensive ROS was negative except as noted in HPI.     Physical Examination:  Wt Readings from Last 4 Encounters:   09/17/21 88.5 kg (195 lb)   09/16/21 89 kg (196 lb 4.8 oz)   07/09/21 92.1 kg (203 lb)   07/07/21 92.1 kg (203 lb)     BP (!) 155/74 (BP Location: Left arm, Patient Position: Sitting, Cuff Size: Adult Regular)   Pulse 60   Ht 1.727 m (5' 8\")   Wt 89 kg (196 lb 3.2 oz)   BMI 29.83 kg/m      This is a warm and pleasant very earnest individual in no acute distress  Mood is \"quite good,\"  affect is appropriate,    Her eyes are clear without proptosis, with healthy appearing lens, conjunctiva and sclera.  EOMs intact.  Nares are patent.  Neck is supple without mass or JVD noted.    Respirations are easy and unlabored.  Skin is warm moist and elastic without lesions noted.  No edema is appreciated in lower extremities.  No pedal lesions are appreciated and sensation is intact to monofilament sensation throughout.      Labs and Studies:       Recent Labs   Lab Test 10/13/21  0905 09/13/21  0848 09/09/21  0837 08/30/21  0849 07/26/21  0902 07/21/21  1804 07/07/21  1059 03/24/21  0812 02/12/21  0000 02/01/21  0723 09/25/20  0826 10/23/19  0802 10/16/19  1002   A1C  --  7.9*  --   --   --   --  9.1*   < >  --   --   --    < >  --    HEMOGLOBINA1  --   --   --   --   --   --   --   --  12.1*  --   --   --   --    TSH  --  4.07*  --   --   --  8.05*  --    < >  --   --   --    < >  --    T4  --  1.00  --   --   --  0.88  --    < >  --   --   --    < >  --    LDL  --  311*  --  186*  --   --   --   --   --   --   --    < >  --    HDL  --  71  --  42  --   --   --   --   --   --   --    < >  --    TRIG  --  90  --  138  --   --   --   --   --   --   --    < >  --    CR 0.82 0.89   < > 0.88   < > 0.98 0.79   < >  --    < >  --    < >  --    MICROL  --   --   --   --   --   --   --   --   --   --  764  --  312    < " > = values in this interval not displayed.     Review of pancreatic imaging reveals no inflammation though at times atrophy.     Ref Range & Units 2 yr ago   2/18/19 2 yr ago   12/23/18 2 yr ago   12/22/18   Amylase 30 - 110 U/L 38     46     67     Resulting Agency  [facility]     TSH   Date Value Ref Range Status   09/13/2021 4.07 (H) 0.40 - 4.00 mU/L Final   03/24/2021 9.86 (H) 0.40 - 4.00 mU/L Final         Assessment:  1. Steroid/immunosuppressant induced diabetes. BG now at goal. He is overweight, but weight is improving.     A. Mild peripheral neuropathy, managed with gabapentin.    B. Elevated urine albumin.   2. Hypothyroidism.   3. Liver transplant.   4. Prostate cancer s/p prostatectomy.   5. Hypertension.     Plan:   Continue current metformin, Januvia. Lantus for now.  Consider transition to Jardiance or GLP-1 agonist in place of Januvia and possibly Lantus in future.    Continue heart healthy diet and physical activity.    Will see nephrology regarding HTN.    TSH is elevated, verify that he is taking medication daily on empty stomach.  He may need increased Levothyroxine.      It is my privilege to be involved in the care of the above patient.     Leia Edward PA-C, MPAS  [facility]  Diabetes, Endocrinology, and Metabolism  [phone] Appointments/Nurse  [phone] Fax  [phone] pager  [phone] nurse line            44 minutes in preparation for visit reviewing chart, labs and documentation, visiting with patient gathering history and in exam, education and counseling, as well as coordination of care, further chart review and documentation following visit on this date of service and as alluded to documented above.

## 2021-09-21 NOTE — TELEPHONE ENCOUNTER
Liver tests better today... Please call Claribel and tell him this.  Ask him to repeat again in 2weeks if still good will decrease labs to every 1-2 mos.    Instructed pt of the above information through interpretor #[id number]. Pt has appt 10/5/21.

## 2021-09-21 NOTE — PROGRESS NOTES
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  He was last seen in clinic 1 year ago by Dr. Sue Tavares, but has also been working with Marcy Wolf and last saw her in August.    He has history of cirrhosis with HCC, portal HTN, latent TB and underwent living donor liver transplant on 12/22/2018. He developed steroid/immunosuppressant induced diabetes and was treated with NPH insulin, which was discontinued once he was no longer taking steroids.    He last saw Marcy in August, he was taking Metformin 1000 mg twice daily, Januvia 25 mg daily, Lantus 10 units 2x daily. His average blood sugar was 257 without any hypoglycemia and a GMI of 9.5 we will increase his Lantus to 15 units in the morning and 15 in the evening and discussed how he might reduce his carbohydrate intake.    The patient's risk factor profile is: (+) HTN, (+) diabetes, (+) hyperlipidemia, (+) prior tobacco use, (-) family Hx CAD.     Interim:  He recently saw cardiology to evaluate exertional chest pain and they are recommending PCSK9 inhibitor for him.  He is to see nephrology for his stage II chronic kidney disease with persistent proteinuria.    Today:  A1C 7.9  Mr. Limon is very worried about his diabetes and has been working to control his diabetes.  He awakes in am and reports  or similar.  He quit eating all tortillas and bread.  Eats whole foods, salads, meat with beans, potatoes,   a bit of rice, cheese, many different vegetables.  He mixes his plate with ranch and it helps all the veggies go down.  Avoids sweets.  He does eat one piece of candy every other day, not every day.    No known       DM med regimen:  Metformin 2000 mg   Januvia 25 mg every day  Lantus 10 units qhs only, unless high (>140 in the morning)  then puts bid      BG monitoring:  Am :  100 - 120.  7 pm: 180, 190 - then takes medications    Medications:   Current Outpatient Medications   Medication Sig Dispense Refill     alfuzosin ER (UROXATRAL) 10 MG 24 hr tablet  (Patient not taking: Reported on 9/17/2021)       amLODIPine (NORVASC) 10 MG tablet Take 1 tablet (10 mg) by mouth daily 90 tablet 3     aspirin (ASA) 325 MG EC tablet Take 1 tablet (325 mg) by mouth daily 90 tablet 3     bisacodyl (DULCOLAX) 10 MG suppository Place 1 suppository (10 mg) rectally daily For constipation. Stop if diarrhea occurs. (Patient not taking: Reported on 9/17/2021) 10 suppository 1     blood glucose (NO BRAND SPECIFIED) lancets standard Use to test blood sugar 4 times daily or as directed. 400 each 2     blood glucose (ONETOUCH VERIO IQ) test strip Use to test blood sugar 2 times daily or as directed. 200 strip 11     blood glucose monitoring (NO BRAND SPECIFIED) meter device kit Use to test blood sugar 2 times daily or as directed. Please let the pt know when it is ready to . 1 kit 0     glucose 40 % (400 mg/mL) gel Take 15-30 g by mouth every 15 minutes as needed for low blood sugar 30 g 3     insulin glargine (LANTUS PEN) 100 UNIT/ML pen Take 10 units in the morning and 10 units in the evening 15 mL 3     insulin glargine (LANTUS SOLOSTAR) 100 UNIT/ML pen 12 Units Inject 14 units subcutaneous daily. 15 mL 1     insulin pen needle (BD KIRK U/F) 32G X 4 MM miscellaneous Use 1 daily. 200 each 3     Lancets (ONETOUCH DELICA PLUS [id number]) MISC U TO TEST QID OR UTD       levothyroxine (SYNTHROID/LEVOTHROID) 100 MCG tablet Take 1 tablet (100 mcg) by mouth daily 90 tablet 3     losartan (COZAAR) 50 MG tablet Take 1 tablet (50 mg) by mouth daily 30 tablet 11     magnesium hydroxide (MILK OF MAGNESIA) 400 MG/5ML suspension Take 30 mLs by mouth daily as needed for constipation or heartburn (Stop if diarrhea occurs) (Patient not taking: Reported on 9/17/2021) 355 mL 1      metFORMIN (GLUCOPHAGE-XR) 500 MG 24 hr tablet Take 2 tablets (1,000 mg) by mouth 2 times daily (with meals) 360 tablet 1     Metoprolol Tartrate 75 MG TABS Take 1 tablet by mouth 2 times daily 180 tablet 3     mineral oil liquid Take 30 mLs by mouth daily For constipation. Stop if diarrhea occurs. (Patient not taking: Reported on 9/17/2021) 500 mL 1     Multiple Vitamin (DAILY-STEVE) TABS Take 1 tablet by mouth daily (Patient not taking: Reported on 9/17/2021) 100 tablet 3     mycophenolate (GENERIC EQUIVALENT) 250 MG capsule Take 3 capsules (750 mg) by mouth 2 times daily (Patient not taking: Reported on 9/17/2021) 540 capsule 3     polyethylene glycol (MIRALAX) 17 GM/Dose powder Take 17 g (1 capful) by mouth daily (Patient not taking: Reported on 9/17/2021) 507 g 3     predniSONE (DELTASONE) 5 MG tablet Take 1 tablet (5 mg) by mouth daily 90 tablet 3     Sharps Container MISC 1 each daily 1 each 2     sildenafil (VIAGRA) 100 MG tablet Take 1/2 to 1 full pill by mouth 1/2 hour before sexual activity (Patient not taking: Reported on 9/17/2021) 10 tablet 3     sitagliptin (JANUVIA) 25 MG tablet Take 1 tablet (25 mg) by mouth daily 90 tablet 1     ursodiol (ACTIGALL) 250 MG tablet Take 1 tablet (250 mg) by mouth 2 times daily 180 tablet 3       Social History     Tobacco Use     Smoking status: Former Smoker     Packs/day: 0.03     Years: 20.00     Pack years: 0.60     Types: Cigarettes, Cigars     Start date: 8/14/1970     Quit date: 3/14/2018     Years since quitting: 3.5     Smokeless tobacco: Never Used     Tobacco comment: Quit smoking Feb 2018, one cigarette after dinner   Substance Use Topics     Alcohol use: No     Comment: Quit drinking Feb 2018       Review of Systems:   A comprehensive ROS was negative except as noted in HPI.     Physical Examination:  Wt Readings from Last 4 Encounters:   09/17/21 88.5 kg (195 lb)   09/16/21 89 kg (196 lb 4.8 oz)   07/09/21 92.1 kg (203 lb)   07/07/21 92.1 kg (203 lb)  "    BP (!) 155/74 (BP Location: Left arm, Patient Position: Sitting, Cuff Size: Adult Regular)   Pulse 60   Ht 1.727 m (5' 8\")   Wt 89 kg (196 lb 3.2 oz)   BMI 29.83 kg/m      This is a warm and pleasant very earnest individual in no acute distress  Mood is \"quite good,\"  affect is appropriate,    Her eyes are clear without proptosis, with healthy appearing lens, conjunctiva and sclera.  EOMs intact.  Nares are patent.  Neck is supple without mass or JVD noted.    Respirations are easy and unlabored.  Skin is warm moist and elastic without lesions noted.  No edema is appreciated in lower extremities.  No pedal lesions are appreciated and sensation is intact to monofilament sensation throughout.      Labs and Studies:       Recent Labs   Lab Test 10/13/21  0905 09/13/21  0848 09/09/21  0837 08/30/21  0849 07/26/21  0902 07/21/21  1804 07/07/21  1059 03/24/21  0812 02/12/21  0000 02/01/21  0723 09/25/20  0826 10/23/19  0802 10/16/19  1002   A1C  --  7.9*  --   --   --   --  9.1*   < >  --   --   --    < >  --    HEMOGLOBINA1  --   --   --   --   --   --   --   --  12.1*  --   --   --   --    TSH  --  4.07*  --   --   --  8.05*  --    < >  --   --   --    < >  --    T4  --  1.00  --   --   --  0.88  --    < >  --   --   --    < >  --    LDL  --  311*  --  186*  --   --   --   --   --   --   --    < >  --    HDL  --  71  --  42  --   --   --   --   --   --   --    < >  --    TRIG  --  90  --  138  --   --   --   --   --   --   --    < >  --    CR 0.82 0.89   < > 0.88   < > 0.98 0.79   < >  --    < >  --    < >  --    MICROL  --   --   --   --   --   --   --   --   --   --  764  --  312    < > = values in this interval not displayed.     Review of pancreatic imaging reveals no inflammation though at times atrophy.     Ref Range & Units 2 yr ago   2/18/19 2 yr ago   12/23/18 2 yr ago   12/22/18   Amylase 30 - 110 U/L 38     46     67     Resulting Agency  [facility]     TSH   Date Value Ref Range Status   09/13/2021 4.07 (H) 0.40 - 4.00 mU/L Final   03/24/2021 9.86 (H) 0.40 - 4.00 mU/L Final         Assessment:  1. Steroid/immunosuppressant induced diabetes. BG now at goal. He is overweight, but weight is improving.     A. Mild peripheral neuropathy, managed with gabapentin.    B. Elevated urine albumin.   2. Hypothyroidism.   3. Liver transplant.   4. Prostate cancer s/p prostatectomy.   5. Hypertension.     Plan:   Continue current metformin, Januvia. Lantus for now.  Consider transition to Jardiance or GLP-1 agonist in place of Januvia and possibly Lantus in future.    Continue heart healthy diet and physical activity.    Will see nephrology regarding HTN.    TSH is elevated, verify that he is taking medication daily on empty stomach.  He may need increased Levothyroxine.      It is my privilege to be involved in the care of the above patient.     Leia Edward PA-C, MPAS  [facility]  Diabetes, Endocrinology, and Metabolism  [phone] Appointments/Nurse  [phone] Fax  [phone] pager  [phone] nurse line            44 minutes in preparation for visit reviewing chart, labs and documentation, visiting with patient gathering history and in exam, education and counseling, as well as coordination of care, further chart review and documentation following visit on this date of service and as alluded to documented above.

## 2021-10-01 ENCOUNTER — LAB (OUTPATIENT)
Dept: LAB | Facility: CLINIC | Age: 66
End: 2021-10-01
Attending: INTERNAL MEDICINE
Payer: COMMERCIAL

## 2021-10-01 DIAGNOSIS — Z11.59 ENCOUNTER FOR SCREENING FOR OTHER VIRAL DISEASES: ICD-10-CM

## 2021-10-01 PROCEDURE — U0003 INFECTIOUS AGENT DETECTION BY NUCLEIC ACID (DNA OR RNA); SEVERE ACUTE RESPIRATORY SYNDROME CORONAVIRUS 2 (SARS-COV-2) (CORONAVIRUS DISEASE [COVID-19]), AMPLIFIED PROBE TECHNIQUE, MAKING USE OF HIGH THROUGHPUT TECHNOLOGIES AS DESCRIBED BY CMS-2020-01-R: HCPCS

## 2021-10-01 PROCEDURE — U0005 INFEC AGEN DETEC AMPLI PROBE: HCPCS

## 2021-10-02 LAB — SARS-COV-2 RNA RESP QL NAA+PROBE: NEGATIVE

## 2021-10-04 ENCOUNTER — TELEPHONE (OUTPATIENT)
Dept: TRANSPLANT | Facility: CLINIC | Age: 66
End: 2021-10-04

## 2021-10-04 ENCOUNTER — APPOINTMENT (OUTPATIENT)
Dept: INTERPRETER SERVICES | Facility: CLINIC | Age: 66
End: 2021-10-04
Payer: COMMERCIAL

## 2021-10-04 NOTE — TELEPHONE ENCOUNTER
Transplant Social Work Services Phone Call      Data: I received a call from patient and a .  Intervention: I consulted with Audelia Malcolm, Liver Transplant Financial Counselor and [facility] [phone].  Assessment: Loy called with concerns about his health insurance.  He has are [employer] (Medicare and Medical Assistance) which is effective this month.  I am not aware of any health insurance concerns, and I shared this with patient.    Loy is traveling to [place] on October 23 for at least three months.  He is aware he will need to bring enough medication along with him.  Plan: [facility]'s will work on overrides for all of patient's medications to ensure he has enough medication to cover his travel. They will contact patient with an  to review his medication supply and needs.      ABRAHAM Gaming, [facility]  Liver Transplant   Phone [phone]  Pager [phone]

## 2021-10-05 ENCOUNTER — HOSPITAL ENCOUNTER (OUTPATIENT)
Facility: CLINIC | Age: 66
Discharge: HOME | End: 2021-10-05
Attending: INTERNAL MEDICINE | Admitting: INTERNAL MEDICINE
Payer: COMMERCIAL

## 2021-10-05 ENCOUNTER — APPOINTMENT (OUTPATIENT)
Dept: MEDSURG UNIT | Facility: CLINIC | Age: 66
End: 2021-10-05
Attending: INTERNAL MEDICINE
Payer: COMMERCIAL

## 2021-10-05 DIAGNOSIS — E03.9 HYPOTHYROIDISM, UNSPECIFIED TYPE: Primary | ICD-10-CM

## 2021-10-05 DIAGNOSIS — Z11.59 ENCOUNTER FOR SCREENING FOR OTHER VIRAL DISEASES: ICD-10-CM

## 2021-10-05 DIAGNOSIS — K72.90 LIVER FAILURE (H): Primary | ICD-10-CM

## 2021-10-05 DIAGNOSIS — R07.9 EXERTIONAL CHEST PAIN: ICD-10-CM

## 2021-10-05 RX ORDER — POTASSIUM CHLORIDE 750 MG/1
20 TABLET, EXTENDED RELEASE ORAL
Status: DISCONTINUED | OUTPATIENT
Start: 2021-10-05 | End: 2021-10-05 | Stop reason: HOSPADM

## 2021-10-05 RX ORDER — SODIUM CHLORIDE 9 MG/ML
INJECTION, SOLUTION INTRAVENOUS CONTINUOUS
Status: DISCONTINUED | OUTPATIENT
Start: 2021-10-05 | End: 2021-10-05 | Stop reason: HOSPADM

## 2021-10-05 RX ORDER — ASPIRIN 325 MG
325 TABLET ORAL ONCE
Status: DISCONTINUED | OUTPATIENT
Start: 2021-10-05 | End: 2021-10-05 | Stop reason: HOSPADM

## 2021-10-05 RX ORDER — LIDOCAINE 40 MG/G
CREAM TOPICAL
Status: DISCONTINUED | OUTPATIENT
Start: 2021-10-05 | End: 2021-10-05 | Stop reason: HOSPADM

## 2021-10-05 RX ORDER — POTASSIUM CHLORIDE 750 MG/1
40 TABLET, EXTENDED RELEASE ORAL
Status: DISCONTINUED | OUTPATIENT
Start: 2021-10-05 | End: 2021-10-05 | Stop reason: HOSPADM

## 2021-10-05 RX ORDER — ASPIRIN 81 MG/1
243 TABLET, CHEWABLE ORAL ONCE
Status: DISCONTINUED | OUTPATIENT
Start: 2021-10-05 | End: 2021-10-05 | Stop reason: HOSPADM

## 2021-10-05 NOTE — RESULT ENCOUNTER NOTE
Levothyroxine dose increased to 112 mcg daily with refill 10/4/2021.  Sue Tavares MD  Endocrinology and Diabetes   [phone]

## 2021-10-07 ENCOUNTER — TELEPHONE (OUTPATIENT)
Dept: CARDIOLOGY | Facility: CLINIC | Age: 66
End: 2021-10-07

## 2021-10-07 NOTE — TELEPHONE ENCOUNTER
Spoke with the patient and he acknowledged understanding with all the instructions given to him for his angiogram. I had him repeat it back to me and he sounds like he remembered everything. Gave my contact if he has any questions.

## 2021-10-11 ENCOUNTER — LAB (OUTPATIENT)
Dept: LAB | Facility: CLINIC | Age: 66
End: 2021-10-11
Payer: COMMERCIAL

## 2021-10-11 DIAGNOSIS — Z11.59 ENCOUNTER FOR SCREENING FOR OTHER VIRAL DISEASES: ICD-10-CM

## 2021-10-11 LAB — SARS-COV-2 RNA RESP QL NAA+PROBE: NEGATIVE

## 2021-10-11 PROCEDURE — U0005 INFEC AGEN DETEC AMPLI PROBE: HCPCS | Mod: 90 | Performed by: PATHOLOGY

## 2021-10-11 PROCEDURE — U0003 INFECTIOUS AGENT DETECTION BY NUCLEIC ACID (DNA OR RNA); SEVERE ACUTE RESPIRATORY SYNDROME CORONAVIRUS 2 (SARS-COV-2) (CORONAVIRUS DISEASE [COVID-19]), AMPLIFIED PROBE TECHNIQUE, MAKING USE OF HIGH THROUGHPUT TECHNOLOGIES AS DESCRIBED BY CMS-2020-01-R: HCPCS | Mod: 90 | Performed by: PATHOLOGY

## 2021-10-12 ENCOUNTER — TELEPHONE (OUTPATIENT)
Dept: CARDIOLOGY | Facility: CLINIC | Age: 66
End: 2021-10-12

## 2021-10-12 ENCOUNTER — APPOINTMENT (OUTPATIENT)
Dept: INTERPRETER SERVICES | Facility: CLINIC | Age: 66
End: 2021-10-12
Payer: COMMERCIAL

## 2021-10-12 DIAGNOSIS — K72.90 LIVER FAILURE (H): Primary | ICD-10-CM

## 2021-10-12 RX ORDER — POTASSIUM CHLORIDE 1500 MG/1
20 TABLET, EXTENDED RELEASE ORAL
Status: CANCELLED | OUTPATIENT
Start: 2021-10-12

## 2021-10-12 RX ORDER — ASPIRIN 81 MG/1
243 TABLET, CHEWABLE ORAL ONCE
Status: CANCELLED | OUTPATIENT
Start: 2021-10-12

## 2021-10-12 RX ORDER — SODIUM CHLORIDE 9 MG/ML
INJECTION, SOLUTION INTRAVENOUS CONTINUOUS
Status: CANCELLED | OUTPATIENT
Start: 2021-10-12

## 2021-10-12 RX ORDER — POTASSIUM CHLORIDE 1500 MG/1
40 TABLET, EXTENDED RELEASE ORAL
Status: CANCELLED | OUTPATIENT
Start: 2021-10-12

## 2021-10-12 RX ORDER — LIDOCAINE 40 MG/G
CREAM TOPICAL
Status: CANCELLED | OUTPATIENT
Start: 2021-10-12

## 2021-10-12 RX ORDER — ASPIRIN 325 MG
325 TABLET ORAL ONCE
Status: CANCELLED | OUTPATIENT
Start: 2021-10-12 | End: 2021-10-12

## 2021-10-13 ENCOUNTER — HOSPITAL ENCOUNTER (OUTPATIENT)
Facility: CLINIC | Age: 66
Discharge: HOME | End: 2021-10-13
Attending: INTERNAL MEDICINE | Admitting: INTERNAL MEDICINE
Payer: COMMERCIAL

## 2021-10-13 ENCOUNTER — APPOINTMENT (OUTPATIENT)
Dept: LAB | Facility: CLINIC | Age: 66
End: 2021-10-13
Attending: INTERNAL MEDICINE
Payer: COMMERCIAL

## 2021-10-13 ENCOUNTER — APPOINTMENT (OUTPATIENT)
Dept: MEDSURG UNIT | Facility: CLINIC | Age: 66
End: 2021-10-13
Attending: INTERNAL MEDICINE
Payer: COMMERCIAL

## 2021-10-13 ENCOUNTER — APPOINTMENT (OUTPATIENT)
Dept: INTERPRETER SERVICES | Facility: CLINIC | Age: 66
End: 2021-10-13
Attending: INTERNAL MEDICINE
Payer: COMMERCIAL

## 2021-10-13 ENCOUNTER — TELEPHONE (OUTPATIENT)
Dept: TRANSPLANT | Facility: CLINIC | Age: 66
End: 2021-10-13

## 2021-10-13 VITALS
BODY MASS INDEX: 31.08 KG/M2 | TEMPERATURE: 97.8 F | OXYGEN SATURATION: 98 % | WEIGHT: 198 LBS | RESPIRATION RATE: 18 BRPM | HEART RATE: 55 BPM | HEIGHT: 67 IN | DIASTOLIC BLOOD PRESSURE: 70 MMHG | SYSTOLIC BLOOD PRESSURE: 137 MMHG

## 2021-10-13 DIAGNOSIS — K72.90 LIVER FAILURE (H): ICD-10-CM

## 2021-10-13 DIAGNOSIS — I25.118 CORONARY ARTERY DISEASE OF NATIVE ARTERY OF NATIVE HEART WITH STABLE ANGINA PECTORIS (H): Primary | ICD-10-CM

## 2021-10-13 DIAGNOSIS — Z94.4 LIVER TRANSPLANT RECIPIENT (H): ICD-10-CM

## 2021-10-13 PROBLEM — Z98.890 STATUS POST CORONARY ANGIOGRAM: Status: ACTIVE | Noted: 2021-10-13

## 2021-10-13 LAB
ACT BLD: 263 SECONDS (ref 74–150)
ACT BLD: 300 SECONDS (ref 74–150)
ALBUMIN SERPL-MCNC: 2.8 G/DL (ref 3.4–5)
ALP SERPL-CCNC: 303 U/L (ref 40–150)
ALT SERPL W P-5'-P-CCNC: 47 U/L (ref 0–70)
ANION GAP SERPL CALCULATED.3IONS-SCNC: 3 MMOL/L (ref 3–14)
AST SERPL W P-5'-P-CCNC: 24 U/L (ref 0–45)
BILIRUB DIRECT SERPL-MCNC: 0.2 MG/DL (ref 0–0.2)
BILIRUB SERPL-MCNC: 0.6 MG/DL (ref 0.2–1.3)
BUN SERPL-MCNC: 18 MG/DL (ref 7–30)
CALCIUM SERPL-MCNC: 8.7 MG/DL (ref 8.5–10.1)
CHLORIDE BLD-SCNC: 108 MMOL/L (ref 94–109)
CO2 SERPL-SCNC: 26 MMOL/L (ref 20–32)
CREAT SERPL-MCNC: 0.82 MG/DL (ref 0.66–1.25)
ERYTHROCYTE [DISTWIDTH] IN BLOOD BY AUTOMATED COUNT: 13.5 % (ref 10–15)
GFR SERPL CREATININE-BSD FRML MDRD: >90 ML/MIN/1.73M2
GLUCOSE BLD-MCNC: 145 MG/DL (ref 70–99)
GLUCOSE BLDC GLUCOMTR-MCNC: 116 MG/DL (ref 70–99)
HCT VFR BLD AUTO: 42.9 % (ref 40–53)
HGB BLD-MCNC: 14.5 G/DL (ref 13.3–17.7)
MCH RBC QN AUTO: 29.8 PG (ref 26.5–33)
MCHC RBC AUTO-ENTMCNC: 33.8 G/DL (ref 31.5–36.5)
MCV RBC AUTO: 88 FL (ref 78–100)
PLATELET # BLD AUTO: 218 10E3/UL (ref 150–450)
POTASSIUM BLD-SCNC: 4.6 MMOL/L (ref 3.4–5.3)
PROT SERPL-MCNC: 6.9 G/DL (ref 6.8–8.8)
RBC # BLD AUTO: 4.87 10E6/UL (ref 4.4–5.9)
SODIUM SERPL-SCNC: 137 MMOL/L (ref 133–144)
WBC # BLD AUTO: 5.4 10E3/UL (ref 4–11)

## 2021-10-13 PROCEDURE — 250N000011 HC RX IP 250 OP 636: Performed by: INTERNAL MEDICINE

## 2021-10-13 PROCEDURE — 82310 ASSAY OF CALCIUM: CPT | Performed by: INTERNAL MEDICINE

## 2021-10-13 PROCEDURE — 36415 COLL VENOUS BLD VENIPUNCTURE: CPT | Performed by: INTERNAL MEDICINE

## 2021-10-13 PROCEDURE — 272N000002 HC OR SUPPLY OTHER OPNP: Performed by: INTERNAL MEDICINE

## 2021-10-13 PROCEDURE — 82962 GLUCOSE BLOOD TEST: CPT

## 2021-10-13 PROCEDURE — 92978 ENDOLUMINL IVUS OCT C 1ST: CPT | Performed by: INTERNAL MEDICINE

## 2021-10-13 PROCEDURE — 999N000134 HC STATISTIC PP CARE STAGE 3

## 2021-10-13 PROCEDURE — C1761 HC OR CATH, TRANS INTRA LITHO/CORONARY: HCPCS | Performed by: INTERNAL MEDICINE

## 2021-10-13 PROCEDURE — 93005 ELECTROCARDIOGRAM TRACING: CPT

## 2021-10-13 PROCEDURE — C1769 GUIDE WIRE: HCPCS | Performed by: INTERNAL MEDICINE

## 2021-10-13 PROCEDURE — C9602 PERC D-E COR STENT ATHER S: HCPCS | Performed by: INTERNAL MEDICINE

## 2021-10-13 PROCEDURE — 99152 MOD SED SAME PHYS/QHP 5/>YRS: CPT | Performed by: INTERNAL MEDICINE

## 2021-10-13 PROCEDURE — C1753 CATH, INTRAVAS ULTRASOUND: HCPCS | Performed by: INTERNAL MEDICINE

## 2021-10-13 PROCEDURE — 250N000013 HC RX MED GY IP 250 OP 250 PS 637: Performed by: INTERNAL MEDICINE

## 2021-10-13 PROCEDURE — C1874 STENT, COATED/COV W/DEL SYS: HCPCS | Performed by: INTERNAL MEDICINE

## 2021-10-13 PROCEDURE — 99153 MOD SED SAME PHYS/QHP EA: CPT | Performed by: INTERNAL MEDICINE

## 2021-10-13 PROCEDURE — 999N000142 HC STATISTIC PROCEDURE PREP ONLY

## 2021-10-13 PROCEDURE — C1725 CATH, TRANSLUMIN NON-LASER: HCPCS | Performed by: INTERNAL MEDICINE

## 2021-10-13 PROCEDURE — 999N000054 HC STATISTIC EKG NON-CHARGEABLE

## 2021-10-13 PROCEDURE — 258N000003 HC RX IP 258 OP 636: Performed by: INTERNAL MEDICINE

## 2021-10-13 PROCEDURE — 80076 HEPATIC FUNCTION PANEL: CPT | Performed by: INTERNAL MEDICINE

## 2021-10-13 PROCEDURE — 93010 ELECTROCARDIOGRAM REPORT: CPT | Mod: 59 | Performed by: INTERNAL MEDICINE

## 2021-10-13 PROCEDURE — 93454 CORONARY ARTERY ANGIO S&I: CPT | Performed by: INTERNAL MEDICINE

## 2021-10-13 PROCEDURE — C9600 PERC DRUG-EL COR STENT SING: HCPCS | Mod: LD | Performed by: INTERNAL MEDICINE

## 2021-10-13 PROCEDURE — 85027 COMPLETE CBC AUTOMATED: CPT | Performed by: INTERNAL MEDICINE

## 2021-10-13 PROCEDURE — 80053 COMPREHEN METABOLIC PANEL: CPT | Performed by: INTERNAL MEDICINE

## 2021-10-13 PROCEDURE — 250N000009 HC RX 250: Performed by: INTERNAL MEDICINE

## 2021-10-13 PROCEDURE — C1894 INTRO/SHEATH, NON-LASER: HCPCS | Performed by: INTERNAL MEDICINE

## 2021-10-13 PROCEDURE — 272N000001 HC OR GENERAL SUPPLY STERILE: Performed by: INTERNAL MEDICINE

## 2021-10-13 PROCEDURE — 93571 IV DOP VEL&/PRESS C FLO 1ST: CPT | Mod: 52,LD | Performed by: INTERNAL MEDICINE

## 2021-10-13 PROCEDURE — 85347 COAGULATION TIME ACTIVATED: CPT

## 2021-10-13 PROCEDURE — C1887 CATHETER, GUIDING: HCPCS | Performed by: INTERNAL MEDICINE

## 2021-10-13 DEVICE — STENT CORONARY DES SYNERGY XD MR US 3.50X38MM H7493941838350: Type: IMPLANTABLE DEVICE | Status: FUNCTIONAL

## 2021-10-13 RX ORDER — HEPARIN SODIUM 1000 [USP'U]/ML
INJECTION, SOLUTION INTRAVENOUS; SUBCUTANEOUS
Status: DISCONTINUED | OUTPATIENT
Start: 2021-10-13 | End: 2021-10-13 | Stop reason: HOSPADM

## 2021-10-13 RX ORDER — ASPIRIN 325 MG
325 TABLET ORAL ONCE
Status: DISCONTINUED | OUTPATIENT
Start: 2021-10-13 | End: 2021-10-13 | Stop reason: HOSPADM

## 2021-10-13 RX ORDER — ATORVASTATIN CALCIUM 40 MG/1
40 TABLET, FILM COATED ORAL DAILY
Qty: 90 TABLET | Refills: 3 | Status: SHIPPED | OUTPATIENT
Start: 2021-10-13 | End: 2022-01-25

## 2021-10-13 RX ORDER — SODIUM CHLORIDE 9 MG/ML
INJECTION, SOLUTION INTRAVENOUS CONTINUOUS
Status: ACTIVE | OUTPATIENT
Start: 2021-10-13 | End: 2021-10-13

## 2021-10-13 RX ORDER — ARGATROBAN 1 MG/ML
350 INJECTION, SOLUTION INTRAVENOUS
Status: DISCONTINUED | OUTPATIENT
Start: 2021-10-13 | End: 2021-10-13 | Stop reason: HOSPADM

## 2021-10-13 RX ORDER — FLUMAZENIL 0.1 MG/ML
0.2 INJECTION, SOLUTION INTRAVENOUS
Status: DISCONTINUED | OUTPATIENT
Start: 2021-10-13 | End: 2021-10-13 | Stop reason: HOSPADM

## 2021-10-13 RX ORDER — FENTANYL CITRATE 50 UG/ML
25 INJECTION, SOLUTION INTRAMUSCULAR; INTRAVENOUS
Status: DISCONTINUED | OUTPATIENT
Start: 2021-10-13 | End: 2021-10-13 | Stop reason: HOSPADM

## 2021-10-13 RX ORDER — ARGATROBAN 1 MG/ML
150 INJECTION, SOLUTION INTRAVENOUS
Status: DISCONTINUED | OUTPATIENT
Start: 2021-10-13 | End: 2021-10-13 | Stop reason: HOSPADM

## 2021-10-13 RX ORDER — NALOXONE HYDROCHLORIDE 0.4 MG/ML
0.4 INJECTION, SOLUTION INTRAMUSCULAR; INTRAVENOUS; SUBCUTANEOUS
Status: DISCONTINUED | OUTPATIENT
Start: 2021-10-13 | End: 2021-10-13 | Stop reason: HOSPADM

## 2021-10-13 RX ORDER — ASPIRIN 81 MG/1
243 TABLET, CHEWABLE ORAL ONCE
Status: DISCONTINUED | OUTPATIENT
Start: 2021-10-13 | End: 2021-10-13 | Stop reason: HOSPADM

## 2021-10-13 RX ORDER — LIDOCAINE 40 MG/G
CREAM TOPICAL
Status: DISCONTINUED | OUTPATIENT
Start: 2021-10-13 | End: 2021-10-13 | Stop reason: HOSPADM

## 2021-10-13 RX ORDER — HYDRALAZINE HYDROCHLORIDE 20 MG/ML
10 INJECTION INTRAMUSCULAR; INTRAVENOUS EVERY 4 HOURS PRN
Status: DISCONTINUED | OUTPATIENT
Start: 2021-10-13 | End: 2021-10-13 | Stop reason: HOSPADM

## 2021-10-13 RX ORDER — IOPAMIDOL 755 MG/ML
INJECTION, SOLUTION INTRAVASCULAR
Status: DISCONTINUED | OUTPATIENT
Start: 2021-10-13 | End: 2021-10-13 | Stop reason: HOSPADM

## 2021-10-13 RX ORDER — NICARDIPINE HYDROCHLORIDE 2.5 MG/ML
INJECTION INTRAVENOUS
Status: DISCONTINUED | OUTPATIENT
Start: 2021-10-13 | End: 2021-10-13 | Stop reason: HOSPADM

## 2021-10-13 RX ORDER — NITROGLYCERIN 20 MG/100ML
10-200 INJECTION INTRAVENOUS CONTINUOUS PRN
Status: DISCONTINUED | OUTPATIENT
Start: 2021-10-13 | End: 2021-10-13 | Stop reason: HOSPADM

## 2021-10-13 RX ORDER — DOPAMINE HYDROCHLORIDE 160 MG/100ML
2-20 INJECTION, SOLUTION INTRAVENOUS CONTINUOUS PRN
Status: DISCONTINUED | OUTPATIENT
Start: 2021-10-13 | End: 2021-10-13 | Stop reason: HOSPADM

## 2021-10-13 RX ORDER — ONDANSETRON 4 MG/1
4 TABLET, ORALLY DISINTEGRATING ORAL EVERY 6 HOURS PRN
Status: DISCONTINUED | OUTPATIENT
Start: 2021-10-13 | End: 2021-10-13 | Stop reason: HOSPADM

## 2021-10-13 RX ORDER — FENTANYL CITRATE 50 UG/ML
INJECTION, SOLUTION INTRAMUSCULAR; INTRAVENOUS
Status: DISCONTINUED | OUTPATIENT
Start: 2021-10-13 | End: 2021-10-13 | Stop reason: HOSPADM

## 2021-10-13 RX ORDER — EPTIFIBATIDE 2 MG/ML
180 INJECTION, SOLUTION INTRAVENOUS EVERY 10 MIN PRN
Status: DISCONTINUED | OUTPATIENT
Start: 2021-10-13 | End: 2021-10-13 | Stop reason: HOSPADM

## 2021-10-13 RX ORDER — EPTIFIBATIDE 2 MG/ML
1 INJECTION, SOLUTION INTRAVENOUS CONTINUOUS PRN
Status: DISCONTINUED | OUTPATIENT
Start: 2021-10-13 | End: 2021-10-13 | Stop reason: HOSPADM

## 2021-10-13 RX ORDER — ONDANSETRON 2 MG/ML
4 INJECTION INTRAMUSCULAR; INTRAVENOUS EVERY 6 HOURS PRN
Status: DISCONTINUED | OUTPATIENT
Start: 2021-10-13 | End: 2021-10-13 | Stop reason: HOSPADM

## 2021-10-13 RX ORDER — SODIUM CHLORIDE 9 MG/ML
INJECTION, SOLUTION INTRAVENOUS CONTINUOUS
Status: DISCONTINUED | OUTPATIENT
Start: 2021-10-13 | End: 2021-10-13 | Stop reason: HOSPADM

## 2021-10-13 RX ORDER — ACETAMINOPHEN 325 MG/1
650 TABLET ORAL EVERY 4 HOURS PRN
Status: DISCONTINUED | OUTPATIENT
Start: 2021-10-13 | End: 2021-10-13 | Stop reason: HOSPADM

## 2021-10-13 RX ORDER — TIROFIBAN HYDROCHLORIDE 50 UG/ML
0.07 INJECTION INTRAVENOUS CONTINUOUS PRN
Status: DISCONTINUED | OUTPATIENT
Start: 2021-10-13 | End: 2021-10-13 | Stop reason: HOSPADM

## 2021-10-13 RX ORDER — HEPARIN SODIUM 10000 [USP'U]/100ML
100-1000 INJECTION, SOLUTION INTRAVENOUS CONTINUOUS PRN
Status: DISCONTINUED | OUTPATIENT
Start: 2021-10-13 | End: 2021-10-13 | Stop reason: HOSPADM

## 2021-10-13 RX ORDER — NALOXONE HYDROCHLORIDE 0.4 MG/ML
0.2 INJECTION, SOLUTION INTRAMUSCULAR; INTRAVENOUS; SUBCUTANEOUS
Status: DISCONTINUED | OUTPATIENT
Start: 2021-10-13 | End: 2021-10-13 | Stop reason: HOSPADM

## 2021-10-13 RX ORDER — NITROGLYCERIN 5 MG/ML
VIAL (ML) INTRAVENOUS
Status: DISCONTINUED | OUTPATIENT
Start: 2021-10-13 | End: 2021-10-13 | Stop reason: HOSPADM

## 2021-10-13 RX ORDER — OXYCODONE HYDROCHLORIDE 10 MG/1
10 TABLET ORAL EVERY 4 HOURS PRN
Status: DISCONTINUED | OUTPATIENT
Start: 2021-10-13 | End: 2021-10-13 | Stop reason: HOSPADM

## 2021-10-13 RX ORDER — METOPROLOL TARTRATE 1 MG/ML
5 INJECTION, SOLUTION INTRAVENOUS
Status: DISCONTINUED | OUTPATIENT
Start: 2021-10-13 | End: 2021-10-13 | Stop reason: HOSPADM

## 2021-10-13 RX ORDER — ATORVASTATIN CALCIUM 40 MG/1
40 TABLET, FILM COATED ORAL AT BEDTIME
Status: DISCONTINUED | OUTPATIENT
Start: 2021-10-13 | End: 2021-10-13 | Stop reason: HOSPADM

## 2021-10-13 RX ORDER — NITROGLYCERIN 0.4 MG/1
0.4 TABLET SUBLINGUAL EVERY 5 MIN PRN
Status: DISCONTINUED | OUTPATIENT
Start: 2021-10-13 | End: 2021-10-13 | Stop reason: HOSPADM

## 2021-10-13 RX ORDER — ASPIRIN 81 MG/1
81 TABLET, CHEWABLE ORAL ONCE
Status: DISCONTINUED | OUTPATIENT
Start: 2021-10-13 | End: 2021-10-13 | Stop reason: CLARIF

## 2021-10-13 RX ORDER — DOBUTAMINE HYDROCHLORIDE 200 MG/100ML
2-20 INJECTION INTRAVENOUS CONTINUOUS PRN
Status: DISCONTINUED | OUTPATIENT
Start: 2021-10-13 | End: 2021-10-13 | Stop reason: HOSPADM

## 2021-10-13 RX ORDER — OXYCODONE HYDROCHLORIDE 5 MG/1
5 TABLET ORAL EVERY 4 HOURS PRN
Status: DISCONTINUED | OUTPATIENT
Start: 2021-10-13 | End: 2021-10-13 | Stop reason: HOSPADM

## 2021-10-13 RX ORDER — ATROPINE SULFATE 0.1 MG/ML
0.5 INJECTION INTRAVENOUS
Status: DISCONTINUED | OUTPATIENT
Start: 2021-10-13 | End: 2021-10-13 | Stop reason: HOSPADM

## 2021-10-13 RX ORDER — ASPIRIN 81 MG/1
81 TABLET ORAL DAILY
Status: DISCONTINUED | OUTPATIENT
Start: 2021-10-14 | End: 2021-10-13 | Stop reason: HOSPADM

## 2021-10-13 RX ADMIN — SODIUM CHLORIDE: 9 INJECTION, SOLUTION INTRAVENOUS at 10:13

## 2021-10-13 ASSESSMENT — MIFFLIN-ST. JEOR: SCORE: 1626.75

## 2021-10-13 NOTE — DISCHARGE INSTRUCTIONS
Going home after a Coronary angiogram with Stent Placement    PROCEDURE SITE:  It is normal to have soreness and small bruising at the puncture site as well as mild tingling in your hand for up to 3 days. You may shower but please do not use a hot tub, bath tub or pool for 2 days. Do not apply any lotion or powder near the site for 3 days. For 3 days do not use your affected hand/arm to support your weight (like rising up out of a chair) or lifting >5 pounds.    MEDICATIONS:  1. You have begun Brilinta (ticagrelor). This medication is essential for your stent(s). Do not stop it without speaking first to your heart doctor or their nurse- this includes if you have concerns about side effects or cost. Also, if another health provider tells you to stop it, let them know they need to discuss it with your heart doctor.   2. If you are on Metformin (Glucophage) or any medications that contain this, you should not take it for at least 48 hours (2 days) from the time of your procedure. If you have baseline kidney problems, you may be instructed to also have a lab test drawn in 2-3 days before getting the okay to restart it.  3. If you have not been continued on other medications you were previously taking at home it is probably for reason though please discuss your concerns with any of your doctors.     DIET:  We recommend a diet low in saturated fat, trans fat and cholesterol. In addition it will be helpful to be cautious of sodium intake and carbohydrates. Try to increase the amount of lean meats you eat like fish and chicken, but avoid frying; and reduce the amount of red meat you eat. Eat more fresh fruits and vegetables and try to avoid canned and processed food. Please reference the handouts you received for more specific information.    CARDIAC REHAB:  After the initial healing process of the procedure site, we recommend cardiac rehabilitation for all heart attack and stent patients. Cardiac rehabilitation will  help you:  - Rebuild stamina, strength and balance.  - Learn how to participate in activities safely, as well as help you regain confidence to do so.  - Return to activities of daily living and leisure.  You should receive a call from them within the next week, but if you do not or you would like to call you can contact their central scheduling at [phone]    OTHER INFORMATION:  1. If you are a smoker, quitting smoking will be one of the most important things you can do for yourself. There are nicotine replacement options or medications they might be able to be prescribed. Please discuss this with your doctors. Consider calling the QuitPlan at [phone] ([phone]) as they can offer ongoing support after discharge.    CALL YOUR DOCTOR IF:  -You have a large or growing lump/bump around the procedure site  -The site is red, swollen, hot, tender or has drainage  -You have hives, a rash or unusual itching  -You have increasing or worsening shortness of breath or chest pain    FOLLOW UP:  We prefer you to follow up with your primary care provider within one week. You will be arranged to see the cardiologist (heart doctor) in clinic in about two weeks.     Should you need to contact us:  Cardiology clinic for scheduling or triage nurse questions/concerns:  [phone]

## 2021-10-13 NOTE — PROGRESS NOTES
D/I/A: Discharge instructions thoroughly reviewed with patient and spouse, via ipad .  Questions answered. Pt also had questions about the procedure and what was done. Dr Andrews notified and came to speak with pt.   P: Continue to monitor status.  Discharge once meeting criteria.    Pt ambulated in marie with steady gait, denies dizziness/lightheadedness. Pt urinated. Pt has tolerated PO. Right wrist site remains CDI, soft, flat, non tender. Arm board removed prior to discharge.     1715 Pt escorted pt to discharge pharmacy then lobby via wheelchair. Pt reports he and his wife will be taking taxi home together.

## 2021-10-13 NOTE — Clinical Note
The first balloon was inserted into the left anterior descending and proximal left anterior descending.Max pressure = 12 kvng. Total duration = 8 seconds.

## 2021-10-13 NOTE — PROGRESS NOTES
D/I/A: Pt roomed on 3C in bay [facility].  Arrived via litter and accompanied by Brett STACY RN On/Off: On monitor.  VSSA.  Rhythm upon arrival SB on monitor. Reviewed activity restrictions and when to notify RN, ie-changes to breathing or increased chest pressure or chest pain.  CCL access:  Right radial, TR band intact.  P: Continue to monitor status.  Discharge to home once meeting criteria.  Ipad  used to communicate.    Pt reports right after the procedure he had 5/10 left chest pain. When pt got to 3C he said it went down to 3/10 and currently is 0/10. Iris NP notified and aware. No new orders received.

## 2021-10-13 NOTE — Clinical Note
The first balloon was inserted into the left anterior descending and proximal left anterior descending.Max pressure = 4 kvng. Total duration = 10 seconds.     Shockwave.

## 2021-10-13 NOTE — Clinical Note
The first balloon was inserted into the left anterior descending and proximal left anterior descending.Max pressure = 14 kvng. Total duration = 10 seconds.

## 2021-10-13 NOTE — Clinical Note
The first balloon was inserted into the left anterior descending and proximal left anterior descending.Max pressure = 4 kvng. Total duration = 10 seconds.     Shock wave.

## 2021-10-13 NOTE — Clinical Note
dry, intact, no bleeding and no hematoma. 6 Fr sheath removed from the RRA. TR band placed. See flowsheet for details.

## 2021-10-13 NOTE — Clinical Note
The first balloon was inserted into the left anterior descending and proximal left anterior descending.Max pressure = 16 kvng. Total duration = 10 seconds.

## 2021-10-13 NOTE — Clinical Note
The first balloon was inserted into the left anterior descending and proximal left anterior descending.Max pressure = 12 kvng. Total duration = 10 seconds.     Max pressure = 15 kvng. Total duration = 9 seconds.    Balloon reinflated a second time: Max pressure = 15 kvng. Total duration = 9 seconds.

## 2021-10-13 NOTE — PRE-PROCEDURE
GENERAL PRE-PROCEDURE:   Procedure:  Coronary angiogram with possible PCI  Date/Time:  10/13/2021 9:50 AM    Verbal consent obtained?: Yes    Written consent obtained?: Yes    Risks and benefits: Risks, benefits and alternatives were discussed    DC Plan: Appropriate discharge home plan in place for patients who are going home after procedure   Consent given by:  Patient  Patient states understanding of procedure being performed: Yes    Patient's understanding of procedure matches consent: Yes    Procedure consent matches procedure scheduled: Yes    Expected level of sedation:  Moderate  Appropriately NPO:  Yes  Mallampati  :  Grade 3- soft palate visible, posterior pharyngeal wall not visible  Lungs:  Lungs clear with good breath sounds bilaterally  Heart:  Normal heart sounds and rate  History & Physical reviewed:  History and physical reviewed and no updates needed  Statement of review:  I have reviewed the lab findings, diagnostic data, medications, and the plan for sedation    Consent completed with Barbra, on iPad    EZRA Velasco, CNP  [facility]

## 2021-10-13 NOTE — Clinical Note
The first balloon was inserted into the left anterior descending and proximal left anterior descending.Max pressure = 12 kvng. Total duration = 12 seconds.

## 2021-10-13 NOTE — TELEPHONE ENCOUNTER
Liver tests continue to improve.  Please call Claribel, let him know this, ask him to repeat labs in a month.    Pt is leaving the country to [place] on October 23rd and not returning for another 3 months. Pt does not have the funds to have labs drawn there. FYI.

## 2021-10-13 NOTE — PROGRESS NOTES
Prepped for coronary angiogram 2A #10.  ipad utilized, Barbra JUÁREZ with [employer].

## 2021-10-13 NOTE — Clinical Note
Stent deployed in the proximal left anterior descending. Max pressure = 12 kvng. Total duration = 8 seconds.

## 2021-10-14 LAB
ATRIAL RATE - MUSE: 53 BPM
ATRIAL RATE - MUSE: 57 BPM
DIASTOLIC BLOOD PRESSURE - MUSE: NORMAL MMHG
DIASTOLIC BLOOD PRESSURE - MUSE: NORMAL MMHG
INTERPRETATION ECG - MUSE: NORMAL
INTERPRETATION ECG - MUSE: NORMAL
P AXIS - MUSE: 35 DEGREES
P AXIS - MUSE: 37 DEGREES
PR INTERVAL - MUSE: 176 MS
PR INTERVAL - MUSE: 190 MS
QRS DURATION - MUSE: 88 MS
QRS DURATION - MUSE: 90 MS
QT - MUSE: 468 MS
QT - MUSE: 498 MS
QTC - MUSE: 455 MS
QTC - MUSE: 467 MS
R AXIS - MUSE: -11 DEGREES
R AXIS - MUSE: -3 DEGREES
SYSTOLIC BLOOD PRESSURE - MUSE: NORMAL MMHG
SYSTOLIC BLOOD PRESSURE - MUSE: NORMAL MMHG
T AXIS - MUSE: 138 DEGREES
T AXIS - MUSE: 141 DEGREES
VENTRICULAR RATE- MUSE: 53 BPM
VENTRICULAR RATE- MUSE: 57 BPM

## 2021-10-21 ENCOUNTER — OFFICE VISIT (OUTPATIENT)
Dept: FAMILY MEDICINE | Facility: CLINIC | Age: 66
End: 2021-10-21
Payer: COMMERCIAL

## 2021-10-21 VITALS
OXYGEN SATURATION: 96 % | SYSTOLIC BLOOD PRESSURE: 138 MMHG | HEART RATE: 63 BPM | BODY MASS INDEX: 31.04 KG/M2 | HEIGHT: 67 IN | DIASTOLIC BLOOD PRESSURE: 74 MMHG | WEIGHT: 197.8 LBS

## 2021-10-21 DIAGNOSIS — Z23 ENCOUNTER FOR IMMUNIZATION: ICD-10-CM

## 2021-10-21 DIAGNOSIS — I25.118 CORONARY ARTERY DISEASE OF NATIVE ARTERY OF NATIVE HEART WITH STABLE ANGINA PECTORIS (H): ICD-10-CM

## 2021-10-21 PROBLEM — G63 POLYNEUROPATHY ASSOCIATED WITH UNDERLYING DISEASE (H): Status: ACTIVE | Noted: 2021-10-21

## 2021-10-21 PROBLEM — E11.65 UNCONTROLLED TYPE 2 DIABETES MELLITUS WITH HYPERGLYCEMIA (H): Status: ACTIVE | Noted: 2021-10-21

## 2021-10-21 PROCEDURE — 99214 OFFICE O/P EST MOD 30 MIN: CPT | Mod: 25 | Performed by: FAMILY MEDICINE

## 2021-10-21 PROCEDURE — 90662 IIV NO PRSV INCREASED AG IM: CPT | Performed by: FAMILY MEDICINE

## 2021-10-21 PROCEDURE — G0008 ADMIN INFLUENZA VIRUS VAC: HCPCS | Performed by: FAMILY MEDICINE

## 2021-10-21 ASSESSMENT — PAIN SCALES - GENERAL: PAINLEVEL: NO PAIN (0)

## 2021-10-21 ASSESSMENT — MIFFLIN-ST. JEOR: SCORE: 1625.84

## 2021-10-21 NOTE — PROGRESS NOTES
"  Assessment & Plan     Coronary artery disease of native artery of native heart with stable angina pectoris (H)  No sx  - aspirin (ASA) 81 MG EC tablet; Take 1 tablet (81 mg) by mouth daily Start tomorrow.  - FLU VACCINE HIGH DOSE PRESERVATIVE FREE, AGE =>65 YR    Encounter for immunization   Due  - FLU VACCINE HIGH DOSE PRESERVATIVE FREE, AGE =>65 YR      33 minutes spent on the date of the encounter doing chart review, history and exam, documentation and further activities per the note. Discussed overall case, meds, as will be in [place] next three months. See me on return.    BMI:   Estimated body mass index is 30.98 kg/m  as calculated from the following:    Height as of this encounter: 1.702 m (5' 7\").    Weight as of this encounter: 89.7 kg (197 lb 12.8 oz).     Jaswinder Anne MD  [facility]    [facility]   Loy is a 66 year old who presents for the following health issues  accompanied by his .    HPI Here in f/u. Going to [place] for three months leaves in two days. Needs some refills. Last visit to me angina, got stent, doing well. Discussed take brilinta plus baby asa every day, not full asa. Angina gone post stent. No acute complaints. Flu shot due.    Past Medical History:   Diagnosis Date     Anemia      Biliary stricture of transplanted liver (H) 2018     BPH (benign prostatic hyperplasia)      Cancer (H)     hepatocellualr carcinoma     Cirrhosis of liver (H) 2018     Diabetes (H)      Enlarged prostate      Hypothyroidism      Impotence of organic origin 2020     Inguinal hernia     Repaired with mesh on 18     Liver lesion 2018     Liver transplanted (H) 2018     donor liver transplant     Portal vein thrombosis     on path explant     Postoperative atrial fibrillation (H) 2018     Prostate cancer (H)      TB lung, latent     Treated      Past Surgical History:   Procedure Laterality Date     " APPENDECTOMY       COLONOSCOPY      2018     CV CORONARY ANGIOGRAM N/A 10/13/2021    Procedure: CV CORONARY ANGIOGRAM;  Surgeon: Yaya Hampton MD;  [facility]     CV CORONARY LITHOTRIPSY PCI N/A 10/13/2021    Procedure: CV Coronary Lithotripsy PCI;  Surgeon: Yaya Hampton MD;  [facility]     CV INSTANTANEOUS WAVE-FREE RATIO N/A 10/13/2021    Procedure: Instantaneous Wave-Free Ratio;  Surgeon: Yaya Hampton MD;  [facility]     CV INTRAVASULAR ULTRASOUND N/A 10/13/2021    Procedure: Intravascular Ultrasound;  Surgeon: Yaya Hampton MD;  [facility]     CV PCI STENT DRUG ELUTING N/A 10/13/2021    Procedure: Percutaneous Coronary Intervention Stent Drug Eluting;  Surgeon: Yaya Hampton MD;  Location:  [facility]     DAVINCI PROSTATECTOMY N/A 1/3/2020    Procedure: Robot-assisted laparoscopic radical prostatectomy, Bladder biopsy;  Surgeon: Kenrick Casiano MD;  [facility]     ENDOSCOPIC RETROGRADE CHOLANGIOPANCREATOGRAM N/A 12/28/2018    Procedure: ENDOSCOPIC RETROGRADE CHOLANGIOPANCREATOGRAM, with Billary Sphincterotomy and Billary Stent Placement;  Surgeon: Omero Lawler MD;  [facility]     ENDOSCOPIC RETROGRADE CHOLANGIOPANCREATOGRAM  2/18/2019    Procedure: COMBINED ENDOSCOPIC RETROGRADE CHOLANGIOPANCREATOGRAPHY, Bile Duct Stent Exchange;  Surgeon: Omero Lawler MD;  [facility]     ENDOSCOPIC RETROGRADE CHOLANGIOPANCREATOGRAM N/A 4/29/2019    Procedure: ENDOSCOPIC RETROGRADE CHOLANGIOPANCREATOGRAPHY, WITH BILIARY STENT REMOVAL AND STONE EXTRACTION;  Surgeon: Omero Lawler MD;  [facility]     HERNIORRHAPHY INGUINAL  12/22/2018    Procedure: Herniorrhaphy inguinal;  Surgeon: Emil Echevarria MD;  [facility]     IR LIVER BIOPSY PERCUTANEOUS  12/31/2018      "LAPAROTOMY EXPLORATORY      per the patient \"for infection in the LLQ\"      TRANSPLANT LIVER RECIPIENT  DONOR N/A 2018    Procedure: TRANSPLANT LIVER RECIPIENT  DONOR;  Surgeon: Emil Echevarria MD;  [facility]     Current Outpatient Medications   Medication     amLODIPine (NORVASC) 10 MG tablet     aspirin (ASA) 81 MG EC tablet     atorvastatin (LIPITOR) 40 MG tablet     blood glucose (NO BRAND SPECIFIED) lancets standard     blood glucose (ONETOUCH VERIO IQ) test strip     blood glucose monitoring (NO BRAND SPECIFIED) meter device kit     glucose 40 % (400 mg/mL) gel     insulin glargine (LANTUS PEN) 100 UNIT/ML pen     insulin glargine (LANTUS SOLOSTAR) 100 UNIT/ML pen     insulin pen needle (BD KIRK U/F) 32G X 4 MM miscellaneous     Lancets (ONETOUCH DELICA PLUS [id number]) MISC     levothyroxine (SYNTHROID/LEVOTHROID) 112 MCG tablet     losartan (COZAAR) 50 MG tablet     magnesium hydroxide (MILK OF MAGNESIA) 400 MG/5ML suspension     metFORMIN (GLUCOPHAGE-XR) 500 MG 24 hr tablet     Metoprolol Tartrate 75 MG TABS     predniSONE (DELTASONE) 5 MG tablet     Sharps Container MISC     sitagliptin (JANUVIA) 25 MG tablet     ticagrelor (BRILINTA) 90 MG tablet     ursodiol (ACTIGALL) 250 MG tablet     alfuzosin ER (UROXATRAL) 10 MG 24 hr tablet     bisacodyl (DULCOLAX) 10 MG suppository     mineral oil liquid     Multiple Vitamin (DAILY-STEVE) TABS     mycophenolate (GENERIC EQUIVALENT) 250 MG capsule     polyethylene glycol (MIRALAX) 17 GM/Dose powder     sildenafil (VIAGRA) 100 MG tablet     No current facility-administered medications for this visit.     No Known Allergies      Past Medical History:   Diagnosis Date     Anemia      Biliary stricture of transplanted liver (H) 2018     BPH (benign prostatic hyperplasia)      Cancer (H)     hepatocellualr carcinoma     Cirrhosis of liver (H) 2018     Diabetes (H)      Enlarged prostate      Hypothyroidism      Impotence of " organic origin 2020     Inguinal hernia     Repaired with mesh on 18     Liver lesion 2018     Liver transplanted (H) 2018     donor liver transplant     Portal vein thrombosis     on path explant     Postoperative atrial fibrillation (H) 2018     Prostate cancer (H)      TB lung, latent     Treated      Past Surgical History:   Procedure Laterality Date     APPENDECTOMY       COLONOSCOPY           CV CORONARY ANGIOGRAM N/A 10/13/2021    Procedure: CV CORONARY ANGIOGRAM;  Surgeon: Yaya Hampton MD;  Location:  [facility]     CV CORONARY LITHOTRIPSY PCI N/A 10/13/2021    Procedure: CV Coronary Lithotripsy PCI;  Surgeon: Yaya Hampton MD;  Location:  [facility]     CV INSTANTANEOUS WAVE-FREE RATIO N/A 10/13/2021    Procedure: Instantaneous Wave-Free Ratio;  Surgeon: Yaya Hampton MD;  Location:  [facility]     CV INTRAVASULAR ULTRASOUND N/A 10/13/2021    Procedure: Intravascular Ultrasound;  Surgeon: Yaya Hampton MD;  Location:  [facility]     CV PCI STENT DRUG ELUTING N/A 10/13/2021    Procedure: Percutaneous Coronary Intervention Stent Drug Eluting;  Surgeon: Yaya Hampton MD;  Location:  [facility]     DAVINCI PROSTATECTOMY N/A 1/3/2020    Procedure: Robot-assisted laparoscopic radical prostatectomy, Bladder biopsy;  Surgeon: Kenrick Casiano MD;  [facility]     ENDOSCOPIC RETROGRADE CHOLANGIOPANCREATOGRAM N/A 2018    Procedure: ENDOSCOPIC RETROGRADE CHOLANGIOPANCREATOGRAM, with Billary Sphincterotomy and Billary Stent Placement;  Surgeon: Omero Lawler MD;  [facility]     ENDOSCOPIC RETROGRADE CHOLANGIOPANCREATOGRAM  2019    Procedure: COMBINED ENDOSCOPIC RETROGRADE CHOLANGIOPANCREATOGRAPHY, Bile Duct Stent Exchange;  Surgeon: Omero Lawler MD;  [facility]      "ENDOSCOPIC RETROGRADE CHOLANGIOPANCREATOGRAM N/A 2019    Procedure: ENDOSCOPIC RETROGRADE CHOLANGIOPANCREATOGRAPHY, WITH BILIARY STENT REMOVAL AND STONE EXTRACTION;  Surgeon: Omero Lawler MD;  [facility]     HERNIORRHAPHY INGUINAL  2018    Procedure: Herniorrhaphy inguinal;  Surgeon: Emil Echevarria MD;  [facility]     IR LIVER BIOPSY PERCUTANEOUS  2018     LAPAROTOMY EXPLORATORY      per the patient \"for infection in the LLQ\"      TRANSPLANT LIVER RECIPIENT  DONOR N/A 2018    Procedure: TRANSPLANT LIVER RECIPIENT  DONOR;  Surgeon: Emil Echevarria MD;  [facility]         Review of Systems         Objective    /74 (BP Location: Right arm, Patient Position: Sitting, Cuff Size: Adult Regular)   Pulse 63   Ht 1.702 m (5' 7\")   Wt 89.7 kg (197 lb 12.8 oz)   SpO2 96%   BMI 30.98 kg/m    Body mass index is 30.98 kg/m .  Physical Exam   GENERAL: healthy, alert and no distress  NECK: no adenopathy, no asymmetry, masses, or scars and thyroid normal to palpation  RESP: lungs clear to auscultation - no rales, rhonchi or wheezes  CV: regular rate and rhythm, normal S1 S2, no S3 or S4, no murmur, click or rub, no peripheral edema and peripheral pulses strong  ABDOMEN: soft, nontender, no hepatosplenomegaly, no masses and bowel sounds normal  MS: no gross musculoskeletal defects noted, no edema              "

## 2021-10-21 NOTE — NURSING NOTE
Loy Limon is a 66 year old male patient that presents today in clinic for the following:    Chief Complaint   Patient presents with     RECHECK     questions about asprin use 81 vs 325; pt needs a 3 month supply of meds     The patient's allergies and medications were reviewed as noted. A set of vitals were recorded as noted without incident. The patient does not have any other questions for the provider.    Juan High, EMT at 1:40 PM on 10/21/2021

## 2021-11-12 ENCOUNTER — DOCUMENTATION ONLY (OUTPATIENT)
Dept: TRANSPLANT | Facility: CLINIC | Age: 66
End: 2021-11-12
Payer: COMMERCIAL

## 2022-01-24 DIAGNOSIS — R73.9 STEROID-INDUCED HYPERGLYCEMIA: ICD-10-CM

## 2022-01-24 DIAGNOSIS — T38.0X5A STEROID-INDUCED HYPERGLYCEMIA: ICD-10-CM

## 2022-01-25 ENCOUNTER — OFFICE VISIT (OUTPATIENT)
Dept: FAMILY MEDICINE | Facility: CLINIC | Age: 67
End: 2022-01-25
Payer: COMMERCIAL

## 2022-01-25 ENCOUNTER — LAB (OUTPATIENT)
Dept: LAB | Facility: CLINIC | Age: 67
End: 2022-01-25
Payer: COMMERCIAL

## 2022-01-25 VITALS
TEMPERATURE: 98 F | WEIGHT: 197 LBS | HEIGHT: 67 IN | RESPIRATION RATE: 16 BRPM | SYSTOLIC BLOOD PRESSURE: 146 MMHG | HEART RATE: 67 BPM | DIASTOLIC BLOOD PRESSURE: 76 MMHG | OXYGEN SATURATION: 93 % | BODY MASS INDEX: 30.92 KG/M2

## 2022-01-25 DIAGNOSIS — I25.118 CORONARY ARTERY DISEASE OF NATIVE ARTERY OF NATIVE HEART WITH STABLE ANGINA PECTORIS (H): Primary | ICD-10-CM

## 2022-01-25 DIAGNOSIS — E03.8 OTHER SPECIFIED HYPOTHYROIDISM: Primary | ICD-10-CM

## 2022-01-25 DIAGNOSIS — E11.9 DM TYPE 2, GOAL HBA1C 7%-8% (H): ICD-10-CM

## 2022-01-25 DIAGNOSIS — R73.9 STEROID-INDUCED HYPERGLYCEMIA: ICD-10-CM

## 2022-01-25 DIAGNOSIS — E03.9 HYPOTHYROIDISM, UNSPECIFIED TYPE: ICD-10-CM

## 2022-01-25 DIAGNOSIS — T38.0X5A STEROID-INDUCED HYPERGLYCEMIA: ICD-10-CM

## 2022-01-25 DIAGNOSIS — I10 ESSENTIAL HYPERTENSION: ICD-10-CM

## 2022-01-25 DIAGNOSIS — I25.118 CORONARY ARTERY DISEASE OF NATIVE ARTERY OF NATIVE HEART WITH STABLE ANGINA PECTORIS (H): ICD-10-CM

## 2022-01-25 DIAGNOSIS — R14.0 ABDOMINAL BLOATING: ICD-10-CM

## 2022-01-25 LAB
ALBUMIN SERPL-MCNC: 3.2 G/DL (ref 3.4–5)
ALP SERPL-CCNC: 326 U/L (ref 40–150)
ALT SERPL W P-5'-P-CCNC: 49 U/L (ref 0–70)
ANION GAP SERPL CALCULATED.3IONS-SCNC: 7 MMOL/L (ref 3–14)
AST SERPL W P-5'-P-CCNC: 23 U/L (ref 0–45)
BASOPHILS # BLD AUTO: 0 10E3/UL (ref 0–0.2)
BASOPHILS NFR BLD AUTO: 1 %
BILIRUB SERPL-MCNC: 0.8 MG/DL (ref 0.2–1.3)
BUN SERPL-MCNC: 15 MG/DL (ref 7–30)
CALCIUM SERPL-MCNC: 8.7 MG/DL (ref 8.5–10.1)
CHLORIDE BLD-SCNC: 102 MMOL/L (ref 94–109)
CHOLEST SERPL-MCNC: 158 MG/DL
CO2 SERPL-SCNC: 28 MMOL/L (ref 20–32)
CREAT SERPL-MCNC: 0.8 MG/DL (ref 0.66–1.25)
CREAT UR-MCNC: 132 MG/DL
EOSINOPHIL # BLD AUTO: 0.2 10E3/UL (ref 0–0.7)
EOSINOPHIL NFR BLD AUTO: 3 %
ERYTHROCYTE [DISTWIDTH] IN BLOOD BY AUTOMATED COUNT: 12.2 % (ref 10–15)
FASTING STATUS PATIENT QL REPORTED: NORMAL
GFR SERPL CREATININE-BSD FRML MDRD: >90 ML/MIN/1.73M2
GLUCOSE BLD-MCNC: 263 MG/DL (ref 70–99)
HBA1C MFR BLD: 10 % (ref 0–5.6)
HCT VFR BLD AUTO: 44.4 % (ref 40–53)
HDLC SERPL-MCNC: 57 MG/DL
HGB BLD-MCNC: 14.5 G/DL (ref 13.3–17.7)
IMM GRANULOCYTES # BLD: 0 10E3/UL
IMM GRANULOCYTES NFR BLD: 0 %
LDLC SERPL CALC-MCNC: 74 MG/DL
LYMPHOCYTES # BLD AUTO: 1.4 10E3/UL (ref 0.8–5.3)
LYMPHOCYTES NFR BLD AUTO: 19 %
MCH RBC QN AUTO: 28.3 PG (ref 26.5–33)
MCHC RBC AUTO-ENTMCNC: 32.7 G/DL (ref 31.5–36.5)
MCV RBC AUTO: 87 FL (ref 78–100)
MICROALBUMIN UR-MCNC: 5750 MG/L
MICROALBUMIN/CREAT UR: 4356.06 MG/G CR (ref 0–17)
MONOCYTES # BLD AUTO: 0.4 10E3/UL (ref 0–1.3)
MONOCYTES NFR BLD AUTO: 5 %
NEUTROPHILS # BLD AUTO: 5.3 10E3/UL (ref 1.6–8.3)
NEUTROPHILS NFR BLD AUTO: 72 %
NONHDLC SERPL-MCNC: 101 MG/DL
NRBC # BLD AUTO: 0 10E3/UL
NRBC BLD AUTO-RTO: 0 /100
PLATELET # BLD AUTO: 222 10E3/UL (ref 150–450)
POTASSIUM BLD-SCNC: 4.5 MMOL/L (ref 3.4–5.3)
PROT SERPL-MCNC: 7 G/DL (ref 6.8–8.8)
RBC # BLD AUTO: 5.12 10E6/UL (ref 4.4–5.9)
SODIUM SERPL-SCNC: 137 MMOL/L (ref 133–144)
T4 FREE SERPL-MCNC: 1 NG/DL (ref 0.76–1.46)
TRIGL SERPL-MCNC: 136 MG/DL
TSH SERPL DL<=0.005 MIU/L-ACNC: 12.29 MU/L (ref 0.4–4)
WBC # BLD AUTO: 7.3 10E3/UL (ref 4–11)

## 2022-01-25 PROCEDURE — 82043 UR ALBUMIN QUANTITATIVE: CPT | Performed by: PATHOLOGY

## 2022-01-25 PROCEDURE — 80053 COMPREHEN METABOLIC PANEL: CPT | Performed by: PATHOLOGY

## 2022-01-25 PROCEDURE — 36415 COLL VENOUS BLD VENIPUNCTURE: CPT | Performed by: PATHOLOGY

## 2022-01-25 PROCEDURE — 84439 ASSAY OF FREE THYROXINE: CPT | Performed by: PATHOLOGY

## 2022-01-25 PROCEDURE — 83036 HEMOGLOBIN GLYCOSYLATED A1C: CPT | Performed by: PATHOLOGY

## 2022-01-25 PROCEDURE — 85025 COMPLETE CBC W/AUTO DIFF WBC: CPT | Performed by: PATHOLOGY

## 2022-01-25 PROCEDURE — 99215 OFFICE O/P EST HI 40 MIN: CPT | Performed by: FAMILY MEDICINE

## 2022-01-25 PROCEDURE — 84443 ASSAY THYROID STIM HORMONE: CPT | Performed by: PATHOLOGY

## 2022-01-25 PROCEDURE — 80061 LIPID PANEL: CPT | Performed by: PATHOLOGY

## 2022-01-25 RX ORDER — ATORVASTATIN CALCIUM 40 MG/1
40 TABLET, FILM COATED ORAL DAILY
Qty: 90 TABLET | Refills: 3 | Status: SHIPPED | OUTPATIENT
Start: 2022-01-25 | End: 2022-05-04

## 2022-01-25 RX ORDER — METFORMIN HCL 500 MG
1000 TABLET, EXTENDED RELEASE 24 HR ORAL 2 TIMES DAILY WITH MEALS
Qty: 360 TABLET | Refills: 3 | Status: SHIPPED | OUTPATIENT
Start: 2022-01-25 | End: 2022-10-21

## 2022-01-25 RX ORDER — LEVOTHYROXINE SODIUM 125 UG/1
125 TABLET ORAL DAILY
Qty: 90 TABLET | Refills: 3 | Status: SHIPPED | OUTPATIENT
Start: 2022-01-25 | End: 2022-10-12

## 2022-01-25 ASSESSMENT — MIFFLIN-ST. JEOR: SCORE: 1622.22

## 2022-01-25 ASSESSMENT — PAIN SCALES - GENERAL: PAINLEVEL: NO PAIN (0)

## 2022-01-25 NOTE — PROGRESS NOTES
"    Assessment & Plan     Coronary artery disease of native artery of native heart with stable angina pectoris (H)  No angina sx. Had gotten off asa, brilinta, statin. Rvwd need for. Will encourage close cardio f/u  - Adult Cardiology Eval  Referral  - atorvastatin (LIPITOR) 40 MG tablet; Take 1 tablet (40 mg) by mouth daily  - ticagrelor (BRILINTA) 90 MG tablet; Take 1 tablet (90 mg) by mouth 2 times daily  - CBC with platelets differential; Future  - aspirin (ASA) 81 MG EC tablet; Take 1 tablet (81 mg) by mouth daily Start tomorrow.    DM type 2, goal HbA1c 7%-8% (H)  Due. As make note, DM control poor. On metformin, Januvia. I've messaged endo provider sees soon. Next visit rvw if seeing eye provider.   - Comprehensive metabolic panel; Future  - TSH with free T4 reflex; Future  - Hemoglobin A1c; Future  - Lipid panel reflex to direct LDL Fasting; Future  - metFORMIN (GLUCOPHAGE-XR) 500 MG 24 hr tablet; Take 2 tablets (1,000 mg) by mouth 2 times daily (with meals)  - Albumin Random Urine Quantitative with Creat Ratio; Future        Abdominal bloating  Normal exam except for what appears to be obesity. Reassured chronic symptom, no pathology found.    Essential hypertension  Resume Norvasc. Cont other meds. He is on ARB, beta blocker as well.      46 minutes spent on the date of the encounter doing chart review, history and exam, documentation and further activities per the note    BMI:   Estimated body mass index is 30.85 kg/m  as calculated from the following:    Height as of this encounter: 1.702 m (5' 7\").    Weight as of this encounter: 89.4 kg (197 lb).       Return in about 3 months (around 4/25/2022).    Jaswinder Anne MD  [facility] is a 66 year old who presents for the following health issues  accompanied by his .    HPI 1-just spent three mo in [place], no new medical complaints while there  2-no recurrence angina " symptoms after last  CAD stent procedure. However, he ran out of his blood thinners, wonders if should resume, we discuss he should be on them and why, refills sent for asa and brilinta.  3-DM: sees Alexx in a month. Fasting today, will do labs. As make note, DM control poor, on Januvia/metformin as noted on med list (rvwd w/ pt), I've messaged DM provider in Feb so aware of poor control  4-liver transplant, due for hepatic panel, will check today, sees hepatology in March.  5-since liver transplant surgery he notes abdomen seems to protrude, he thinks too much, wonders if a medical reason. Discussed at length as he's mentioned before. Appetite good, no n/v, stooling normal. Not excess gas. MR last  no pathologic findings. No ascites. He is worried in particular a cancer could be causing the protruberance. He has had this symptom since liver transplant surgery and it is not worse, reviewed nothing to suggest cancer on imaging tests last . Colonoscoy 2018 in care everywhere reviewed.  6-Prostate cancer, reviews he sees oncology in a mo, he knows to keep that  7-htn; has Norvasc but not taking, forgot if needs or not    Past Medical History:   Diagnosis Date     Anemia      Biliary stricture of transplanted liver (H) 2018     BPH (benign prostatic hyperplasia)      Cancer (H)     hepatocellualr carcinoma     Cirrhosis of liver (H) 2018     Diabetes (H)      Enlarged prostate      Hypothyroidism      Impotence of organic origin 2020     Inguinal hernia     Repaired with mesh on 18     Liver lesion 2018     Liver transplanted (H) 2018     donor liver transplant     Portal vein thrombosis     on path explant     Postoperative atrial fibrillation (H) 2018     Prostate cancer (H)      TB lung, latent     Treated      Past Surgical History:   Procedure Laterality Date     APPENDECTOMY       COLONOSCOPY           CV CORONARY ANGIOGRAM N/A 10/13/2021     "Procedure: CV CORONARY ANGIOGRAM;  Surgeon: Yaya Hampton MD;  Location:  [facility]     CV CORONARY LITHOTRIPSY PCI N/A 10/13/2021    Procedure: CV Coronary Lithotripsy PCI;  Surgeon: Yaya Hampton MD;  Location:  [facility]     CV INSTANTANEOUS WAVE-FREE RATIO N/A 10/13/2021    Procedure: Instantaneous Wave-Free Ratio;  Surgeon: Yaya Hampton MD;  Location:  [facility]     CV INTRAVASULAR ULTRASOUND N/A 10/13/2021    Procedure: Intravascular Ultrasound;  Surgeon: Yaya Hampton MD;  Location:  [facility]     CV PCI STENT DRUG ELUTING N/A 10/13/2021    Procedure: Percutaneous Coronary Intervention Stent Drug Eluting;  Surgeon: Yaya Hampton MD;  Location:  [facility]     DAVINCI PROSTATECTOMY N/A 1/3/2020    Procedure: Robot-assisted laparoscopic radical prostatectomy, Bladder biopsy;  Surgeon: Kenrick Casiano MD;  [facility]     ENDOSCOPIC RETROGRADE CHOLANGIOPANCREATOGRAM N/A 12/28/2018    Procedure: ENDOSCOPIC RETROGRADE CHOLANGIOPANCREATOGRAM, with Billary Sphincterotomy and Billary Stent Placement;  Surgeon: Omero Lawler MD;  [facility]     ENDOSCOPIC RETROGRADE CHOLANGIOPANCREATOGRAM  2/18/2019    Procedure: COMBINED ENDOSCOPIC RETROGRADE CHOLANGIOPANCREATOGRAPHY, Bile Duct Stent Exchange;  Surgeon: Omero Lawler MD;  [facility]     ENDOSCOPIC RETROGRADE CHOLANGIOPANCREATOGRAM N/A 4/29/2019    Procedure: ENDOSCOPIC RETROGRADE CHOLANGIOPANCREATOGRAPHY, WITH BILIARY STENT REMOVAL AND STONE EXTRACTION;  Surgeon: Omero Lawler MD;  [facility]     HERNIORRHAPHY INGUINAL  12/22/2018    Procedure: Herniorrhaphy inguinal;  Surgeon: Emil Echevarria MD;  [facility]     IR LIVER BIOPSY PERCUTANEOUS  12/31/2018     LAPAROTOMY EXPLORATORY      per the patient \"for infection in the LLQ\"      TRANSPLANT " "LIVER RECIPIENT  DONOR N/A 2018    Procedure: TRANSPLANT LIVER RECIPIENT  DONOR;  Surgeon: Emil Echevarria MD;  [facility]     Current Outpatient Medications   Medication     amLODIPine (NORVASC) 10 MG tablet     aspirin (ASA) 81 MG EC tablet     atorvastatin (LIPITOR) 40 MG tablet     blood glucose (NO BRAND SPECIFIED) lancets standard     blood glucose (ONETOUCH VERIO IQ) test strip     blood glucose monitoring (NO BRAND SPECIFIED) meter device kit     glucose 40 % (400 mg/mL) gel     insulin glargine (LANTUS PEN) 100 UNIT/ML pen     insulin glargine (LANTUS SOLOSTAR) 100 UNIT/ML pen     insulin pen needle (BD KIRK U/F) 32G X 4 MM miscellaneous     Lancets (ONETOUCH DELICA PLUS [id number]) MISC     levothyroxine (SYNTHROID/LEVOTHROID) 112 MCG tablet     losartan (COZAAR) 50 MG tablet     magnesium hydroxide (MILK OF MAGNESIA) 400 MG/5ML suspension     metFORMIN (GLUCOPHAGE-XR) 500 MG 24 hr tablet     Metoprolol Tartrate 75 MG TABS     mineral oil liquid     Multiple Vitamin (DAILY-STEVE) TABS     mycophenolate (GENERIC EQUIVALENT) 250 MG capsule     polyethylene glycol (MIRALAX) 17 GM/Dose powder     predniSONE (DELTASONE) 5 MG tablet     Sharps Container MISC     sitagliptin (JANUVIA) 25 MG tablet     ticagrelor (BRILINTA) 90 MG tablet     ursodiol (ACTIGALL) 250 MG tablet     alfuzosin ER (UROXATRAL) 10 MG 24 hr tablet     bisacodyl (DULCOLAX) 10 MG suppository     sildenafil (VIAGRA) 100 MG tablet     No current facility-administered medications for this visit.     No Known Allergies     Shots utd          Review of Systems         Objective    BP (!) 146/76   Pulse 67   Temp 98  F (36.7  C) (Oral)   Resp 16   Ht 1.702 m (5' 7\")   Wt 89.4 kg (197 lb)   SpO2 93%   BMI 30.85 kg/m    Body mass index is 30.85 kg/m .  Physical Exam   GENERAL: healthy, alert and no distress  NECK: no adenopathy, no asymmetry, masses, or scars and thyroid normal to palpation  RESP: lungs clear to " auscultation - no rales, rhonchi or wheezes  CV: regular rate and rhythm, normal S1 S2, no S3 or S4, no murmur, click or rub, no peripheral edema and peripheral pulses strong  ABDOMEN: soft, nontender, no hepatosplenomegaly, no masses and bowel sounds normal  MS: no gross musculoskeletal defects noted, no edema

## 2022-01-25 NOTE — NURSING NOTE
Chief Complaint   Patient presents with     Recheck Medication     Patient comes in for a follow up.         Ridge Post MA on 1/25/2022 at 7:51 AM

## 2022-01-26 ENCOUNTER — TELEPHONE (OUTPATIENT)
Dept: INTERNAL MEDICINE | Facility: CLINIC | Age: 67
End: 2022-01-26
Payer: COMMERCIAL

## 2022-01-26 ENCOUNTER — APPOINTMENT (OUTPATIENT)
Dept: INTERPRETER SERVICES | Facility: CLINIC | Age: 67
End: 2022-01-26
Payer: COMMERCIAL

## 2022-01-26 DIAGNOSIS — E11.65 UNCONTROLLED TYPE 2 DIABETES MELLITUS WITH HYPERGLYCEMIA (H): ICD-10-CM

## 2022-01-26 DIAGNOSIS — E11.9 TYPE 2 DIABETES MELLITUS (H): ICD-10-CM

## 2022-01-26 RX ORDER — PEN NEEDLE, DIABETIC 32GX 5/32"
NEEDLE, DISPOSABLE MISCELLANEOUS
Qty: 100 EACH | Refills: 1 | Status: SHIPPED | OUTPATIENT
Start: 2022-01-26 | End: 2022-10-11
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
## 2022-01-27 ENCOUNTER — TELEPHONE (OUTPATIENT)
Dept: ENDOCRINOLOGY | Facility: CLINIC | Age: 67
End: 2022-01-27

## 2022-01-27 ENCOUNTER — TELEPHONE (OUTPATIENT)
Dept: TRANSPLANT | Facility: CLINIC | Age: 67
End: 2022-01-27
Payer: COMMERCIAL

## 2022-01-27 NOTE — TELEPHONE ENCOUNTER
Patient is scheduled for in person on virtual day and needs sooner appt if possible     Please schedule him on 2/15 at 3:30 with Leia Edward in person if still available
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
## 2022-01-27 NOTE — TELEPHONE ENCOUNTER
Sixto from [facility] calls to clarify if pt is taking tacro along with mycophenolate and prednisone. Informed Sixto that tacro was stopped and not on pt's med list. Is that correct?

## 2022-01-28 RX ORDER — SITAGLIPTIN 25 MG/1
TABLET, FILM COATED ORAL
Qty: 120 TABLET | Refills: 0 | OUTPATIENT
Start: 2022-01-28

## 2022-02-08 ENCOUNTER — TELEPHONE (OUTPATIENT)
Dept: FAMILY MEDICINE | Facility: CLINIC | Age: 67
End: 2022-02-08
Payer: COMMERCIAL

## 2022-02-08 DIAGNOSIS — E11.9 DM TYPE 2, GOAL HBA1C 7%-8% (H): ICD-10-CM

## 2022-02-08 DIAGNOSIS — R73.9 HYPERGLYCEMIA: ICD-10-CM

## 2022-02-08 RX ORDER — BLOOD SUGAR DIAGNOSTIC
STRIP MISCELLANEOUS
Qty: 400 STRIP | Refills: 3 | Status: SHIPPED | OUTPATIENT
Start: 2022-02-08 | End: 2022-10-21

## 2022-02-08 NOTE — TELEPHONE ENCOUNTER
Test strips  Meter    1/25/2022  [facility] Jaswinder Salazar MD    Family Medicine

## 2022-02-08 NOTE — TELEPHONE ENCOUNTER
[facility] Call Center    Phone Message    May a detailed message be left on voicemail: yes     Reason for Call: Medication Refill Request    Has the patient contacted the pharmacy for the refill? Yes   Name of medication being requested: blood glucose (ONETOUCH VERIO IQ) test strip   Provider who prescribed the medication: Jaswinder Anne MD  Pharmacy: [facility] - [place], [place] - [address]  Date medication is needed: asap    Rakesh from Pharmacy requesting meds are filled. Patient is out of refills.    Action Taken: Message routed to:  [facility]    Travel Screening: Not Applicable

## 2022-02-15 ENCOUNTER — OFFICE VISIT (OUTPATIENT)
Dept: ENDOCRINOLOGY | Facility: CLINIC | Age: 67
End: 2022-02-15
Payer: COMMERCIAL

## 2022-02-15 VITALS
WEIGHT: 201.4 LBS | BODY MASS INDEX: 31.61 KG/M2 | HEART RATE: 61 BPM | HEIGHT: 67 IN | SYSTOLIC BLOOD PRESSURE: 154 MMHG | DIASTOLIC BLOOD PRESSURE: 68 MMHG

## 2022-02-15 DIAGNOSIS — Z94.4 LIVER TRANSPLANT STATUS (H): ICD-10-CM

## 2022-02-15 DIAGNOSIS — E03.9 ACQUIRED HYPOTHYROIDISM: Primary | ICD-10-CM

## 2022-02-15 DIAGNOSIS — R80.9 MICROALBUMINURIA: ICD-10-CM

## 2022-02-15 DIAGNOSIS — I10 BENIGN ESSENTIAL HYPERTENSION: ICD-10-CM

## 2022-02-15 DIAGNOSIS — E11.65 UNCONTROLLED TYPE 2 DIABETES MELLITUS WITH HYPERGLYCEMIA (H): ICD-10-CM

## 2022-02-15 DIAGNOSIS — D84.9 IMMUNOSUPPRESSION (H): ICD-10-CM

## 2022-02-15 DIAGNOSIS — G63 POLYNEUROPATHY ASSOCIATED WITH UNDERLYING DISEASE (H): ICD-10-CM

## 2022-02-15 PROBLEM — C22.0 HCC (HEPATOCELLULAR CARCINOMA) (H): Status: RESOLVED | Noted: 2018-06-04 | Resolved: 2022-02-15

## 2022-02-15 PROBLEM — K76.6 PORTAL HYPERTENSION (H): Status: RESOLVED | Noted: 2018-03-14 | Resolved: 2022-02-15

## 2022-02-15 PROCEDURE — 99215 OFFICE O/P EST HI 40 MIN: CPT | Performed by: PHYSICIAN ASSISTANT

## 2022-02-15 ASSESSMENT — PAIN SCALES - GENERAL: PAINLEVEL: NO PAIN (0)

## 2022-02-15 ASSESSMENT — MIFFLIN-ST. JEOR: SCORE: 1642.17

## 2022-02-15 NOTE — PROGRESS NOTES
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  He was last seen in clinic in October 2020 by Dr. Sue Tavares, but has also been working with Marcy Wolf and last saw her in August 2021.    He has history of cirrhosis with HCC, portal HTN, latent TB and underwent living donor liver transplant on 12/22/2018. He developed steroid/immunosuppressant induced diabetes and was treated with NPH insulin, which was discontinued once he was no longer taking steroids.  He also has hypertension hyperlipidemia and a history of smoking.    He last saw Marcy in August, he was taking Metformin 1000 mg twice daily, Januvia 25 mg daily, Lantus 10 units 2x daily. His average blood sugar was 257 without any hypoglycemia and a GMI of 9.5 we will increase his Lantus to 15 units in the morning and 15 in the evening and discussed how he might reduce his carbohydrate intake.    I met Jennie in Sept 2021:  A1C 7.9 on same meds x reduced Lantus to 10 units once daily. Discussed possible GLP-1 agonist or SGLT2-i in place of Januvia and Lantus.  Noted overweight.      The patient's risk factor profile is: (+) HTN, (+) diabetes, (+) hyperlipidemia, (+) prior tobacco use, (-) family Hx CAD.     Interim:  He presented to the emergency room with exertional chest pain in October and underwent cardiac catheterization with PCI of an LAD and shockwave lithotripsy of an LAD, with placement of a drug-eluting stent.  He has followed up with his primary care provider Dr. Reyes.  He was advised to remain on dual antiplatelet therapy for 6 to 12 months including daily aspirin 81 mg and ticagrelor.    Today:  A1C 7.9  Mr. Limon tells me he is out of test strips.  He was in [place] for some time and has not been testing. He had a  "medical provider try to sell some to him for $30 for 15 test strips.  He now can check and this morning was 169.  He just got the test strips yesterday.    He gives insulin depending on BG gives depending on BG .  He shoots for  - 130. He feels better at these levels.    He feels very heavy.  He knows he ate too much in [place].  At no longer having dinner - just fruit or vegetables to lose weight.  For breakfast having coffee and apple.  Waits and then at 10 am will have a good brunch \"almuerzo\"  at 10 am.  At 3 pm will have \"comida.\"  He is feeling very fat, feels better when eating less so no tortillas. He just started the diet 1 week ago, but it feels good.  He does eat peanuts at night when watching television.  This feels better than something with carbs.  He had swollen feet before and resolved with low salt and carb diet.    Would really like to lose some weight    He ran out of thyroid medication and was out for three months. He takes always 8 am with his other morning meds.  He can wait 30 minutes until coffee.   He was told by another provider the important thing is for him to take his medications and so he must take them altogether at once.    Asks about thyroid disease.  He shares that his neck feels fine and he wonders what thyroid does and why he needs this medication if he will need it for the rest of his life.      Never has had pancreatitis, thyroid cancer, nor has his family.  He has never heard of MEM's or anything like this.    DM med regimen:  Metformin 2000 mg  (2 qam, 2qpm)  Januvia 25 mg every day (each morning)  Lantus 10 units qhs only, If he checks BG and >200, gives 15 units, If >230, gives 20.        BG monitoring:    He was out of test strips and was unable to get them.  Just checked his blood sugar this morning it was 169.      History of Diabetes monitoring and complications/ prevention:  CAD: Yes status post stent placement in October 2021.    Last eye exam results: Presented " for dilated eye exam in July 2021 results are not clear in the note.  Denies any knowledge of having any diabetic eye disease   last dental exam: Unknown   Microalbuminuria:5700+ January 2021  HTN: Yes on amlodipine Cozaar and metoprolol.  On statin: On atorvastatin 40 mg daily  On ASA: Yes daily in addition to Brilinta  Depression: Denies  ED:    PMH:  Reviewed, extensive and is outlined above now with coronary artery disease.    Medications:   Current Outpatient Medications   Medication Sig Dispense Refill     amLODIPine (NORVASC) 10 MG tablet Take 1 tablet (10 mg) by mouth daily 120 tablet 0     atorvastatin (LIPITOR) 40 MG tablet Take 1 tablet (40 mg) by mouth daily 90 tablet 3     blood glucose (NO BRAND SPECIFIED) lancets standard Use to test blood sugar 4 times daily or as directed. 400 each 2     blood glucose (ONETOUCH VERIO IQ) test strip Use to test blood sugar 4 times daily or as directed. 400 strip 3     blood glucose monitoring (NO BRAND SPECIFIED) meter device kit Use to test blood sugar 4 times daily or as directed. Please let the pt know when it is ready to . 1 kit 0     glucose 40 % (400 mg/mL) gel Take 15-30 g by mouth every 15 minutes as needed for low blood sugar 30 g 3     insulin glargine (LANTUS PEN) 100 UNIT/ML pen Take 15 units in the morning and 15 units in the evening 15 mL 1     insulin glargine (LANTUS SOLOSTAR) 100 UNIT/ML pen 12 Units Inject 14 units subcutaneous daily. 15 mL 1     insulin pen needle (BD KIRK U/F) 32G X 4 MM miscellaneous Use 1 daily. 100 each 1     Lancets (ONETOUCH DELICA PLUS [id number]) MISC U TO TEST QID OR UTD       levothyroxine (SYNTHROID/LEVOTHROID) 112 MCG tablet Take 1 tablet (112 mcg) by mouth daily 120 tablet 0     levothyroxine (SYNTHROID/LEVOTHROID) 125 MCG tablet Take 1 tablet (125 mcg) by mouth daily 90 tablet 3     losartan (COZAAR) 50 MG tablet Take 1 tablet (50 mg) by mouth daily 120 tablet 0     metFORMIN (GLUCOPHAGE-XR) 500 MG 24 hr tablet  Take 2 tablets (1,000 mg) by mouth 2 times daily (with meals) 360 tablet 3     Metoprolol Tartrate 75 MG TABS Take 1 tablet by mouth 2 times daily 240 tablet 0     mycophenolate (GENERIC EQUIVALENT) 250 MG capsule Take 3 capsules (750 mg) by mouth 2 times daily 540 capsule 3     predniSONE (DELTASONE) 5 MG tablet Take 1 tablet (5 mg) by mouth daily 90 tablet 3     Sharps Container MISC 1 each daily 1 each 2     sitagliptin (JANUVIA) 25 MG tablet Take 1 tablet (25 mg) by mouth daily 90 tablet 2     ticagrelor (BRILINTA) 90 MG tablet Take 1 tablet (90 mg) by mouth 2 times daily 180 tablet 3     ursodiol (ACTIGALL) 250 MG tablet Take 1 tablet (250 mg) by mouth 2 times daily 180 tablet 3     alfuzosin ER (UROXATRAL) 10 MG 24 hr tablet  (Patient not taking: Reported on 10/21/2021)       aspirin (ASA) 81 MG EC tablet Take 1 tablet (81 mg) by mouth daily Start tomorrow. (Patient not taking: Reported on 2/15/2022) 90 tablet 3     bisacodyl (DULCOLAX) 10 MG suppository Place 1 suppository (10 mg) rectally daily For constipation. Stop if diarrhea occurs. (Patient not taking: Reported on 10/21/2021) 10 suppository 1     magnesium hydroxide (MILK OF MAGNESIA) 400 MG/5ML suspension Take 30 mLs by mouth daily as needed for constipation or heartburn (Stop if diarrhea occurs) (Patient not taking: Reported on 2/15/2022) 355 mL 1     mineral oil liquid Take 30 mLs by mouth daily For constipation. Stop if diarrhea occurs. (Patient not taking: Reported on 2/15/2022) 500 mL 1     Multiple Vitamin (DAILY-STEVE) TABS Take 1 tablet by mouth daily (Patient not taking: Reported on 2/15/2022) 100 tablet 3     polyethylene glycol (MIRALAX) 17 GM/Dose powder Take 17 g (1 capful) by mouth daily (Patient not taking: Reported on 2/15/2022) 507 g 3     sildenafil (VIAGRA) 100 MG tablet Take 1/2 to 1 full pill by mouth 1/2 hour before sexual activity (Patient not taking: Reported on 10/21/2021) 10 tablet 3       Social History     Tobacco Use      "Smoking status: Former Smoker     Packs/day: 0.03     Years: 20.00     Pack years: 0.60     Types: Cigarettes, Cigars     Start date: 8/14/1970     Quit date: 3/14/2018     Years since quitting: 3.9     Smokeless tobacco: Never Used     Tobacco comment: Quit smoking Feb 2018, one cigarette after dinner   Substance Use Topics     Alcohol use: No     Comment: Quit drinking Feb 2018       Review of Systems:   A comprehensive ROS was negative except as noted in HPI.     Physical Examination:  Wt Readings from Last 4 Encounters:   02/15/22 91.4 kg (201 lb 6.4 oz)   01/25/22 89.4 kg (197 lb)   10/21/21 89.7 kg (197 lb 12.8 oz)   10/13/21 89.8 kg (198 lb)     BP (!) 154/68 (BP Location: Left arm, Patient Position: Sitting, Cuff Size: Adult Regular)   Pulse 61   Ht 1.702 m (5' 7\")   Wt 91.4 kg (201 lb 6.4 oz)   BMI 31.54 kg/m      This is a warm and pleasant very earnest individual in no acute distress  Mood is \"quite good,\"  affect is appropriate,    Her eyes are clear without proptosis, with healthy appearing lens, conjunctiva and sclera.  EOMs intact.  Nares are patent.  Neck is supple without mass or JVD noted.    Respirations are easy and unlabored.  Skin is warm moist and elastic without lesions noted.  No edema is appreciated in lower extremities.  No pedal lesions are appreciated and sensation is intact to monofilament sensation throughout.  + Trace edema lower leg.        Labs and Studies:       Recent Labs   Lab Test 01/25/22  0858 01/25/22  0851 10/13/21  0905 09/13/21  0848 03/24/21  0812 02/12/21  0000 02/01/21  0723 09/25/20  0826   A1C  --  10.0*  --  7.9*   < >  --   --   --    HEMOGLOBINA1  --   --   --   --   --  12.1*  --   --    TSH  --  12.29*  --  4.07*   < >  --   --   --    T4  --  1.00  --  1.00   < >  --   --   --    LDL  --  74  --  311*   < >  --   --   --    HDL  --  57  --  71   < >  --   --   --    TRIG  --  136  --  90   < >  --   --   --    CR  --  0.80 0.82 0.89   < >  --    < >  --  "   MICROL 5,750  --   --   --   --   --   --  764    < > = values in this interval not displayed.     Review of pancreatic imaging reveals no inflammation though at times atrophy.     Ref Range & Units 2 yr ago   2/18/19 2 yr ago   12/23/18 2 yr ago   12/22/18   Amylase 30 - 110 U/L 38     46     67     Resulting Agency  [facility]     TSH   Date Value Ref Range Status   01/25/2022 12.29 (H) 0.40 - 4.00 mU/L Final   03/24/2021 9.86 (H) 0.40 - 4.00 mU/L Final     GFR Estimate   Date Value Ref Range Status   01/25/2022 >90 >60 mL/min/1.73m2 Final     Comment:     Effective December 21, 2021 eGFRcr in adults is calculated using the 2021 CKD-EPI creatinine equation which includes age and gender (Vito et al., NEJ, DOI: 10.1056/NIEFmf1457753)   10/13/2021 >90 >60 mL/min/1.73m2 Final     Comment:     As of July 11, 2021, eGFR is calculated by the CKD-EPI creatinine equation, without race adjustment. eGFR can be influenced by muscle mass, exercise, and diet. The reported eGFR is an estimation only and is only applicable if the renal function is stable.   09/13/2021 88 >60 mL/min/1.73m2 Final     Comment:     As of July 11, 2021, eGFR is calculated by the CKD-EPI creatinine equation, without race adjustment. eGFR can be influenced by muscle mass, exercise, and diet. The reported eGFR is an estimation only and is only applicable if the renal function is stable.   07/07/2021 >90 >60 mL/min/[1.73_m2] Final     Comment:     Non  GFR Calc  Starting 12/18/2018, serum creatinine based estimated GFR (eGFR) will be   calculated using the Chronic Kidney Disease Epidemiology Collaboration   (CKD-EPI) equation.     04/23/2021 >90 >60 mL/min/[1.73_m2] Final     Comment:     Non  GFR Calc  Starting 12/18/2018, serum creatinine based estimated GFR (eGFR) will be   calculated using the  Chronic Kidney Disease Epidemiology Collaboration   (CKD-EPI) equation.     03/24/2021 89 >60 mL/min/[1.73_m2] Final     Comment:     Non  GFR Calc  Starting 12/18/2018, serum creatinine based estimated GFR (eGFR) will be   calculated using the Chronic Kidney Disease Epidemiology Collaboration   (CKD-EPI) equation.           MR rojas w/o contrast 9/17/21:  Pancreas: Partial fatty replacement with preserved increased  precontrast T1 signal. No focal lesions. Main pancreatic duct is not  Dilated.    Review of pancreatic imaging reveals no inflammation though at times atrophy.     Assessment:  1. Steroid/immunosuppressant induced diabetes.  Now complicated by coronary artery disease and his weight is increased.  He also has marked microalbuminuria.   A. Mild peripheral neuropathy, managed with gabapentin.    B. Elevated urine albumin.   2. Hypothyroidism.  -Again he has been out of his medication for the last month.  Does not understand why this medication is necessary  3. 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 Prostate cancer s/p prostatectomy.   5. Hypertension.   6.  CKD stage II with marked microalbuminuria    Plan:   1.  Blood sugars have been of above goal and his A1c was 10.1 last month.  He now also has cardiovascular disease and his weight is markedly increased.  Would like to lose weight.  Discussed the risks and advantages of starting a GLP-1 agonist.    Meet with nurse today to learn how to inject Trulicity.  We will start with a 0.5 mg dose and if tolerated in 1 month he will increase to 1.5 mg.  Understands that once Trulicity is on board he should hold his Januvia.  Continue heart healthy diet and physical activity.  We discussed goal blood sugars.      2.  Hypothyroidism: Time explaining what this is and that indeed he will need replacement for life.  He will resume the medication each morning that with his other medications, wait 1/2-hour before having coffee and possibly small  portion of bread or apple.  He will go to the lab in 2 months to recheck his TSH R we will do it when he is back in clinic.    3.  Continues to be followed by a solid organ transplant team.  5/6.  He does have follow-up scheduled with Dr. Ryan Eaton nephrology on February 25.    It is my privilege to be involved in the care of the above patient.     Leia Edward PA-C, MPAS  [facility]  Diabetes, Endocrinology, and Metabolism  [phone] Appointments/Nurse  [phone] Fax  [phone] pager  [phone] nurse line            50 exam, education and counseling, as well as coordination of care, further chart review and documentation following visit on this date of service and as alluded to documented above.      Visited completed in Armenian.  Patient was offered professional  for this visit, but declined.

## 2022-02-15 NOTE — PATIENT INSTRUCTIONS
Estimado Don Limon,    Por favor vuelve a chequer niveles de tiroides en samaniego natali en dos meses.      Por favor, intenta con Trulicity 0.75 mg injeccion cada semana x 4 semanas, y de samantha con 1.5 mg x 8 semanas.     Liliam vez que se pone la injeccion de Trulicity deje samaniego Januvia.      Vuelve en 12 semanas.  Puede tambien caroline con la educadora de diabetes por ayuda con samaniego peso y usando insulina.    My best wishes,    Leia Edward PA-C, MPAS  [facility]  Diabetes, Endocrinology, and Metabolism  [phone] Appointments/Nurse  [phone] Fax  [phone] URGENTafter hours/weekend Endocrinologist on call                  We appreciate your assistance in coordinating your healthcare.     Please upload your insulin pump, blood sugar meter and/or continuous glucose monitor at home 1-2 days before your next diabetes-related appointment.   This will allow your provider to review your  data before your scheduled virtual visit.    To ask a question to your Endocrine care team, please send them a ReserveOut message, or reach them by phone at [phone]     To expedite your medication refill(s), please contact your pharmacy and have them   fax a refill request to: [phone].  *Please allow 3 business days for routine medication refills.  *Please allow 5 business days for controlled substance medication refills.    For after-hours urgent Endocrine issues, that do not require 431, please dial [phone], and ask to speak with the Endocrinologist On-Call

## 2022-02-15 NOTE — LETTER
2/15/2022       RE: Loy Limon  [address]  [facility] [address]     Dear Colleague,    Thank you for referring your patient, Loy Limon, to the [facility] ENDOCRINOLOGY CLINIC [place] at [facility]. Please see a copy of my visit note below.    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  He was last seen in clinic in October 2020 by Dr. Sue Tavares, but has also been working with Marcy Wolf and last saw her in August 2021.    He has history of cirrhosis with HCC, portal HTN, latent TB and underwent living donor liver transplant on 12/22/2018. He developed steroid/immunosuppressant induced diabetes and was treated with NPH insulin, which was discontinued once he was no longer taking steroids.  He also has hypertension hyperlipidemia and a history of smoking.    He last saw Marcy in August, he was taking Metformin 1000 mg twice daily, Januvia 25 mg daily, Lantus 10 units 2x daily. His average blood sugar was 257 without any hypoglycemia and a GMI of 9.5 we will increase his Lantus to 15 units in the morning and 15 in the evening and discussed how he might reduce his carbohydrate intake.    I met Jennie in Sept 2021:  A1C 7.9 on same meds x reduced Lantus to 10 units once daily. Discussed possible GLP-1 agonist or SGLT2-i in place of Januvia and Lantus.  Noted overweight.      The patient's risk factor profile is: (+) HTN, (+) diabetes, (+) hyperlipidemia, (+) prior tobacco use, (-) family Hx CAD.     Interim:  He presented to the emergency room with exertional chest pain in October and underwent cardiac catheterization with PCI of an LAD and shockwave lithotripsy of an LAD, with placement of a  "drug-eluting stent.  He has followed up with his primary care provider Dr. Reyes.  He was advised to remain on dual antiplatelet therapy for 6 to 12 months including daily aspirin 81 mg and ticagrelor.    Today:  A1C 7.9  Mr. Limon tells me he is out of test strips.  He was in [place] for some time and has not been testing. He had a medical provider try to sell some to him for $30 for 15 test strips.  He now can check and this morning was 169.  He just got the test strips yesterday.    He gives insulin depending on BG gives depending on BG .  He shoots for  - 130. He feels better at these levels.    He feels very heavy.  He knows he ate too much in [place].  At no longer having dinner - just fruit or vegetables to lose weight.  For breakfast having coffee and apple.  Waits and then at 10 am will have a good brunch \"almuerzo\"  at 10 am.  At 3 pm will have \"comida.\"  He is feeling very fat, feels better when eating less so no tortillas. He just started the diet 1 week ago, but it feels good.  He does eat peanuts at night when watching television.  This feels better than something with carbs.  He had swollen feet before and resolved with low salt and carb diet.    Would really like to lose some weight    He ran out of thyroid medication and was out for three months. He takes always 8 am with his other morning meds.  He can wait 30 minutes until coffee.   He was told by another provider the important thing is for him to take his medications and so he must take them altogether at once.    Asks about thyroid disease.  He shares that his neck feels fine and he wonders what thyroid does and why he needs this medication if he will need it for the rest of his life.      Never has had pancreatitis, thyroid cancer, nor has his family.  He has never heard of MEM's or anything like this.    DM med regimen:  Metformin 2000 mg  (2 qam, 2qpm)  Januvia 25 mg every day (each morning)  Lantus 10 units qhs only, If he checks BG " and >200, gives 15 units, If >230, gives 20.        BG monitoring:    He was out of test strips and was unable to get them.  Just checked his blood sugar this morning it was 169.      History of Diabetes monitoring and complications/ prevention:  CAD: Yes status post stent placement in October 2021.    Last eye exam results: Presented for dilated eye exam in July 2021 results are not clear in the note.  Denies any knowledge of having any diabetic eye disease   last dental exam: Unknown   Microalbuminuria:5700+ January 2021  HTN: Yes on amlodipine Cozaar and metoprolol.  On statin: On atorvastatin 40 mg daily  On ASA: Yes daily in addition to Brilinta  Depression: Denies  ED:    PMH:  Reviewed, extensive and is outlined above now with coronary artery disease.    Medications:   Current Outpatient Medications   Medication Sig Dispense Refill     amLODIPine (NORVASC) 10 MG tablet Take 1 tablet (10 mg) by mouth daily 120 tablet 0     atorvastatin (LIPITOR) 40 MG tablet Take 1 tablet (40 mg) by mouth daily 90 tablet 3     blood glucose (NO BRAND SPECIFIED) lancets standard Use to test blood sugar 4 times daily or as directed. 400 each 2     blood glucose (ONETOUCH VERIO IQ) test strip Use to test blood sugar 4 times daily or as directed. 400 strip 3     blood glucose monitoring (NO BRAND SPECIFIED) meter device kit Use to test blood sugar 4 times daily or as directed. Please let the pt know when it is ready to . 1 kit 0     glucose 40 % (400 mg/mL) gel Take 15-30 g by mouth every 15 minutes as needed for low blood sugar 30 g 3     insulin glargine (LANTUS PEN) 100 UNIT/ML pen Take 15 units in the morning and 15 units in the evening 15 mL 1     insulin glargine (LANTUS SOLOSTAR) 100 UNIT/ML pen 12 Units Inject 14 units subcutaneous daily. 15 mL 1     insulin pen needle (BD KIRK U/F) 32G X 4 MM miscellaneous Use 1 daily. 100 each 1     Lancets (ONETOUCH DELICA PLUS [id number]) MISC U TO TEST QID OR UTD        levothyroxine (SYNTHROID/LEVOTHROID) 112 MCG tablet Take 1 tablet (112 mcg) by mouth daily 120 tablet 0     levothyroxine (SYNTHROID/LEVOTHROID) 125 MCG tablet Take 1 tablet (125 mcg) by mouth daily 90 tablet 3     losartan (COZAAR) 50 MG tablet Take 1 tablet (50 mg) by mouth daily 120 tablet 0     metFORMIN (GLUCOPHAGE-XR) 500 MG 24 hr tablet Take 2 tablets (1,000 mg) by mouth 2 times daily (with meals) 360 tablet 3     Metoprolol Tartrate 75 MG TABS Take 1 tablet by mouth 2 times daily 240 tablet 0     mycophenolate (GENERIC EQUIVALENT) 250 MG capsule Take 3 capsules (750 mg) by mouth 2 times daily 540 capsule 3     predniSONE (DELTASONE) 5 MG tablet Take 1 tablet (5 mg) by mouth daily 90 tablet 3     Sharps Container MISC 1 each daily 1 each 2     sitagliptin (JANUVIA) 25 MG tablet Take 1 tablet (25 mg) by mouth daily 90 tablet 2     ticagrelor (BRILINTA) 90 MG tablet Take 1 tablet (90 mg) by mouth 2 times daily 180 tablet 3     ursodiol (ACTIGALL) 250 MG tablet Take 1 tablet (250 mg) by mouth 2 times daily 180 tablet 3     alfuzosin ER (UROXATRAL) 10 MG 24 hr tablet  (Patient not taking: Reported on 10/21/2021)       aspirin (ASA) 81 MG EC tablet Take 1 tablet (81 mg) by mouth daily Start tomorrow. (Patient not taking: Reported on 2/15/2022) 90 tablet 3     bisacodyl (DULCOLAX) 10 MG suppository Place 1 suppository (10 mg) rectally daily For constipation. Stop if diarrhea occurs. (Patient not taking: Reported on 10/21/2021) 10 suppository 1     magnesium hydroxide (MILK OF MAGNESIA) 400 MG/5ML suspension Take 30 mLs by mouth daily as needed for constipation or heartburn (Stop if diarrhea occurs) (Patient not taking: Reported on 2/15/2022) 355 mL 1     mineral oil liquid Take 30 mLs by mouth daily For constipation. Stop if diarrhea occurs. (Patient not taking: Reported on 2/15/2022) 500 mL 1     Multiple Vitamin (DAILY-STEVE) TABS Take 1 tablet by mouth daily (Patient not taking: Reported on 2/15/2022) 100 tablet  "3     polyethylene glycol (MIRALAX) 17 GM/Dose powder Take 17 g (1 capful) by mouth daily (Patient not taking: Reported on 2/15/2022) 507 g 3     sildenafil (VIAGRA) 100 MG tablet Take 1/2 to 1 full pill by mouth 1/2 hour before sexual activity (Patient not taking: Reported on 10/21/2021) 10 tablet 3       Social History     Tobacco Use     Smoking status: Former Smoker     Packs/day: 0.03     Years: 20.00     Pack years: 0.60     Types: Cigarettes, Cigars     Start date: 8/14/1970     Quit date: 3/14/2018     Years since quitting: 3.9     Smokeless tobacco: Never Used     Tobacco comment: Quit smoking Feb 2018, one cigarette after dinner   Substance Use Topics     Alcohol use: No     Comment: Quit drinking Feb 2018       Review of Systems:   A comprehensive ROS was negative except as noted in HPI.     Physical Examination:  Wt Readings from Last 4 Encounters:   02/15/22 91.4 kg (201 lb 6.4 oz)   01/25/22 89.4 kg (197 lb)   10/21/21 89.7 kg (197 lb 12.8 oz)   10/13/21 89.8 kg (198 lb)     BP (!) 154/68 (BP Location: Left arm, Patient Position: Sitting, Cuff Size: Adult Regular)   Pulse 61   Ht 1.702 m (5' 7\")   Wt 91.4 kg (201 lb 6.4 oz)   BMI 31.54 kg/m      This is a warm and pleasant very earnest individual in no acute distress  Mood is \"quite good,\"  affect is appropriate,    Her eyes are clear without proptosis, with healthy appearing lens, conjunctiva and sclera.  EOMs intact.  Nares are patent.  Neck is supple without mass or JVD noted.    Respirations are easy and unlabored.  Skin is warm moist and elastic without lesions noted.  No edema is appreciated in lower extremities.  No pedal lesions are appreciated and sensation is intact to monofilament sensation throughout.  + Trace edema lower leg.        Labs and Studies:       Recent Labs   Lab Test 01/25/22  0858 01/25/22  0851 10/13/21  0905 09/13/21  0848 03/24/21  0812 02/12/21  0000 02/01/21  0723 09/25/20  0826   A1C  --  10.0*  --  7.9*   < >  --   " --   --    HEMOGLOBINA1  --   --   --   --   --  12.1*  --   --    TSH  --  12.29*  --  4.07*   < >  --   --   --    T4  --  1.00  --  1.00   < >  --   --   --    LDL  --  74  --  311*   < >  --   --   --    HDL  --  57  --  71   < >  --   --   --    TRIG  --  136  --  90   < >  --   --   --    CR  --  0.80 0.82 0.89   < >  --    < >  --    MICROL 5,750  --   --   --   --   --   --  764    < > = values in this interval not displayed.     Review of pancreatic imaging reveals no inflammation though at times atrophy.     Ref Range & Units 2 yr ago   2/18/19 2 yr ago   12/23/18 2 yr ago   12/22/18   Amylase 30 - 110 U/L 38     46     67     Resulting Agency  [facility]     TSH   Date Value Ref Range Status   01/25/2022 12.29 (H) 0.40 - 4.00 mU/L Final   03/24/2021 9.86 (H) 0.40 - 4.00 mU/L Final     GFR Estimate   Date Value Ref Range Status   01/25/2022 >90 >60 mL/min/1.73m2 Final     Comment:     Effective December 21, 2021 eGFRcr in adults is calculated using the 2021 CKD-EPI creatinine equation which includes age and gender (Vito lorenzo al., NEJ, DOI: 10.1056/LEZMmv7832238)   10/13/2021 >90 >60 mL/min/1.73m2 Final     Comment:     As of July 11, 2021, eGFR is calculated by the CKD-EPI creatinine equation, without race adjustment. eGFR can be influenced by muscle mass, exercise, and diet. The reported eGFR is an estimation only and is only applicable if the renal function is stable.   09/13/2021 88 >60 mL/min/1.73m2 Final     Comment:     As of July 11, 2021, eGFR is calculated by the CKD-EPI creatinine equation, without race adjustment. eGFR can be influenced by muscle mass, exercise, and diet. The reported eGFR is an estimation only and is only applicable if the renal function is stable.   07/07/2021 >90 >60 mL/min/[1.73_m2] Final     Comment:     Non  GFR Calc  Starting 12/18/2018,  serum creatinine based estimated GFR (eGFR) will be   calculated using the Chronic Kidney Disease Epidemiology Collaboration   (CKD-EPI) equation.     04/23/2021 >90 >60 mL/min/[1.73_m2] Final     Comment:     Non  GFR Calc  Starting 12/18/2018, serum creatinine based estimated GFR (eGFR) will be   calculated using the Chronic Kidney Disease Epidemiology Collaboration   (CKD-EPI) equation.     03/24/2021 89 >60 mL/min/[1.73_m2] Final     Comment:     Non  GFR Calc  Starting 12/18/2018, serum creatinine based estimated GFR (eGFR) will be   calculated using the Chronic Kidney Disease Epidemiology Collaboration   (CKD-EPI) equation.            w w/o contrast 9/17/21:  Pancreas: Partial fatty replacement with preserved increased  precontrast T1 signal. No focal lesions. Main pancreatic duct is not  Dilated.    Review of pancreatic imaging reveals no inflammation though at times atrophy.     Assessment:  1. Steroid/immunosuppressant induced diabetes.  Now complicated by coronary artery disease and his weight is increased.  He also has marked microalbuminuria.   A. Mild peripheral neuropathy, managed with gabapentin.    B. Elevated urine albumin.   2. Hypothyroidism.  -Again he has been out of his medication for the last month.  Does not understand why this medication is necessary  3. 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 Prostate cancer s/p prostatectomy.   5. Hypertension.   6.  CKD stage II with marked microalbuminuria    Plan:   1.  Blood sugars have been of above goal and his A1c was 10.1 last month.  He now also has cardiovascular disease and his weight is markedly increased.  Would like to lose weight.  Discussed the risks and advantages of starting a GLP-1 agonist.    Meet with nurse today to learn how to inject Trulicity.  We will start with a 0.5 mg dose and if tolerated in 1 month he will increase to 1.5 mg.  Understands that once Trulicity is on board he should  hold his Januvia.  Continue heart healthy diet and physical activity.  We discussed goal blood sugars.      2.  Hypothyroidism: Time explaining what this is and that indeed he will need replacement for life.  He will resume the medication each morning that with his other medications, wait 1/2-hour before having coffee and possibly small portion of bread or apple.  He will go to the lab in 2 months to recheck his TSH R we will do it when he is back in clinic.    3.  Continues to be followed by a solid organ transplant team.  5/6.  He does have follow-up scheduled with Dr. Ryan Eaton nephrology on February 25.    It is my privilege to be involved in the care of the above patient.     Leia Edward PA-C, MPAS  [facility]  Diabetes, Endocrinology, and Metabolism  [phone] Appointments/Nurse  [phone] Fax  [phone] pager  [phone] nurse line        50 exam, education and counseling, as well as coordination of care, further chart review and documentation following visit on this date of service and as alluded to documented above.

## 2022-02-16 NOTE — NURSING NOTE
Teaching Flowsheet   Relevant Diagnosis:Type 2 Diabetes    Teaching Topic: Trulicity  Pen once weekly subcutaneous      Person(s) involved in teaching:   Patient and       Motivation Level:  Asks Questions: Yes  Eager to Learn: Yes  Cooperative: Yes  Receptive (willing/able to accept information): Yes  Any cultural factors/Zoroastrian beliefs that may influence understanding or compliance?  needed to communicate    Comments: Used tele      Patient demonstrates understanding of the following:  Reason for the appointment, diagnosis and treatment plan: Yes  Knowledge of proper use of medications and conditions for which they are ordered (with special attention to potential side effects or drug interactions): Yes  Which situations necessitate calling provider and whom to contact: Yes, he should call if any abdominal pain , vomiting or nausea tat won't go away        Teaching Concerns Addressed:   Comments: He did 3 return demonstrations with excellent technique      Proper use and care of pen (medical equip, care aids, etc.): Yes  Nutritional needs and diet plan: Yes  Pain management techniques: NO  Wound Care: NA  How and/when to access community resources: Yes     Instructional Materials Used/Given: yes     Time spent with patient:15 minutes

## 2022-02-18 ENCOUNTER — TELEPHONE (OUTPATIENT)
Dept: CARDIOLOGY | Facility: CLINIC | Age: 67
End: 2022-02-18
Payer: COMMERCIAL

## 2022-02-18 ENCOUNTER — APPOINTMENT (OUTPATIENT)
Dept: INTERPRETER SERVICES | Facility: CLINIC | Age: 67
End: 2022-02-18
Payer: COMMERCIAL

## 2022-02-18 NOTE — TELEPHONE ENCOUNTER
Please assist with rescheduling Dr. Nicholas visit on March 18th as Dr. Nicholas will not be in this day.

## 2022-02-21 DIAGNOSIS — N18.2 CKD (CHRONIC KIDNEY DISEASE) STAGE 2, GFR 60-89 ML/MIN: Primary | ICD-10-CM

## 2022-02-24 ENCOUNTER — APPOINTMENT (OUTPATIENT)
Dept: INTERPRETER SERVICES | Facility: CLINIC | Age: 67
End: 2022-02-24
Payer: COMMERCIAL

## 2022-02-25 DIAGNOSIS — E55.9 VITAMIN D DEFICIENCY, UNSPECIFIED: ICD-10-CM

## 2022-02-25 DIAGNOSIS — N18.2 CKD (CHRONIC KIDNEY DISEASE) STAGE 2, GFR 60-89 ML/MIN: Primary | ICD-10-CM

## 2022-03-02 ENCOUNTER — LAB (OUTPATIENT)
Dept: LAB | Facility: CLINIC | Age: 67
End: 2022-03-02
Payer: COMMERCIAL

## 2022-03-02 ENCOUNTER — OFFICE VISIT (OUTPATIENT)
Dept: NEPHROLOGY | Facility: CLINIC | Age: 67
End: 2022-03-02
Payer: COMMERCIAL

## 2022-03-02 VITALS
OXYGEN SATURATION: 96 % | HEART RATE: 64 BPM | HEIGHT: 67 IN | WEIGHT: 201.2 LBS | BODY MASS INDEX: 31.58 KG/M2 | DIASTOLIC BLOOD PRESSURE: 76 MMHG | SYSTOLIC BLOOD PRESSURE: 131 MMHG

## 2022-03-02 DIAGNOSIS — N18.2 CKD (CHRONIC KIDNEY DISEASE) STAGE 2, GFR 60-89 ML/MIN: ICD-10-CM

## 2022-03-02 DIAGNOSIS — E03.8 OTHER SPECIFIED HYPOTHYROIDISM: ICD-10-CM

## 2022-03-02 DIAGNOSIS — E55.9 VITAMIN D DEFICIENCY: Primary | ICD-10-CM

## 2022-03-02 DIAGNOSIS — E55.9 VITAMIN D DEFICIENCY, UNSPECIFIED: ICD-10-CM

## 2022-03-02 DIAGNOSIS — I10 ESSENTIAL HYPERTENSION: ICD-10-CM

## 2022-03-02 LAB
ALBUMIN SERPL-MCNC: 3.2 G/DL (ref 3.4–5)
ALBUMIN UR-MCNC: >2000 MG/DL
ANION GAP SERPL CALCULATED.3IONS-SCNC: 4 MMOL/L (ref 3–14)
APPEARANCE UR: CLEAR
BILIRUB UR QL STRIP: NEGATIVE
BUN SERPL-MCNC: 14 MG/DL (ref 7–30)
CALCIUM SERPL-MCNC: 8.7 MG/DL (ref 8.5–10.1)
CHLORIDE BLD-SCNC: 107 MMOL/L (ref 94–109)
CO2 SERPL-SCNC: 27 MMOL/L (ref 20–32)
COLOR UR AUTO: YELLOW
CREAT SERPL-MCNC: 0.75 MG/DL (ref 0.66–1.25)
CREAT UR-MCNC: 82 MG/DL
DEPRECATED CALCIDIOL+CALCIFEROL SERPL-MC: 12 UG/L (ref 20–75)
ERYTHROCYTE [DISTWIDTH] IN BLOOD BY AUTOMATED COUNT: 12.3 % (ref 10–15)
GFR SERPL CREATININE-BSD FRML MDRD: >90 ML/MIN/1.73M2
GLUCOSE BLD-MCNC: 112 MG/DL (ref 70–99)
GLUCOSE UR STRIP-MCNC: NEGATIVE MG/DL
HCT VFR BLD AUTO: 42.8 % (ref 40–53)
HGB BLD-MCNC: 14 G/DL (ref 13.3–17.7)
HGB UR QL STRIP: ABNORMAL
KETONES UR STRIP-MCNC: NEGATIVE MG/DL
LEUKOCYTE ESTERASE UR QL STRIP: NEGATIVE
MCH RBC QN AUTO: 27.9 PG (ref 26.5–33)
MCHC RBC AUTO-ENTMCNC: 32.7 G/DL (ref 31.5–36.5)
MCV RBC AUTO: 85 FL (ref 78–100)
MUCOUS THREADS #/AREA URNS LPF: PRESENT /LPF
NITRATE UR QL: NEGATIVE
PH UR STRIP: 6 [PH] (ref 5–7)
PHOSPHATE SERPL-MCNC: 3.2 MG/DL (ref 2.5–4.5)
PLATELET # BLD AUTO: 235 10E3/UL (ref 150–450)
POTASSIUM BLD-SCNC: 4.3 MMOL/L (ref 3.4–5.3)
PROT UR-MCNC: 2.49 G/L
PROT/CREAT 24H UR: 3.04 G/G CR (ref 0–0.2)
PTH-INTACT SERPL-MCNC: 83 PG/ML (ref 15–65)
RBC # BLD AUTO: 5.02 10E6/UL (ref 4.4–5.9)
RBC URINE: 2 /HPF
SODIUM SERPL-SCNC: 138 MMOL/L (ref 133–144)
SP GR UR STRIP: 1.02 (ref 1–1.03)
SQUAMOUS EPITHELIAL: <1 /HPF
UROBILINOGEN UR STRIP-MCNC: NORMAL MG/DL
WBC # BLD AUTO: 5.8 10E3/UL (ref 4–11)
WBC URINE: 1 /HPF

## 2022-03-02 PROCEDURE — 84156 ASSAY OF PROTEIN URINE: CPT | Performed by: PATHOLOGY

## 2022-03-02 PROCEDURE — 85027 COMPLETE CBC AUTOMATED: CPT | Performed by: PATHOLOGY

## 2022-03-02 PROCEDURE — 99214 OFFICE O/P EST MOD 30 MIN: CPT

## 2022-03-02 PROCEDURE — 83970 ASSAY OF PARATHORMONE: CPT | Performed by: PATHOLOGY

## 2022-03-02 PROCEDURE — G0463 HOSPITAL OUTPT CLINIC VISIT: HCPCS

## 2022-03-02 PROCEDURE — 80069 RENAL FUNCTION PANEL: CPT | Performed by: PATHOLOGY

## 2022-03-02 PROCEDURE — 99000 SPECIMEN HANDLING OFFICE-LAB: CPT | Performed by: PATHOLOGY

## 2022-03-02 PROCEDURE — 82306 VITAMIN D 25 HYDROXY: CPT | Mod: 90 | Performed by: PATHOLOGY

## 2022-03-02 PROCEDURE — 81001 URINALYSIS AUTO W/SCOPE: CPT | Performed by: PATHOLOGY

## 2022-03-02 PROCEDURE — 36415 COLL VENOUS BLD VENIPUNCTURE: CPT | Performed by: PATHOLOGY

## 2022-03-02 RX ORDER — LOSARTAN POTASSIUM 50 MG/1
100 TABLET ORAL DAILY
Qty: 180 TABLET | Refills: 0 | Status: SHIPPED | OUTPATIENT
Start: 2022-03-02 | End: 2022-10-21

## 2022-03-02 RX ORDER — LEVOTHYROXINE SODIUM 125 UG/1
125 TABLET ORAL DAILY
Qty: 90 TABLET | Refills: 3 | COMMUNITY
Start: 2022-03-02 | End: 2022-05-04

## 2022-03-02 RX ORDER — AMLODIPINE BESYLATE 5 MG/1
5 TABLET ORAL DAILY
Qty: 90 TABLET | Refills: 3 | Status: SHIPPED | OUTPATIENT
Start: 2022-03-02 | End: 2022-10-11

## 2022-03-02 ASSESSMENT — PAIN SCALES - GENERAL: PAINLEVEL: NO PAIN (0)

## 2022-03-02 NOTE — LETTER
3/2/2022     RE: Loy Limon  [address]  [facility] [address]     Dear Colleague,    Thank you for referring your patient, Loy Limon, to the [facility] NEPHROLOGY CLINIC [place] at [facility]. Please see a copy of my visit note below.    Nephrology Clinic Visit 3/2/22    Assessment and Plan:    1. Proteinuria - Worsening. UPCR 3.0 g/gCr in setting of uncontrolled DM.    - Current evaluation for proteinuria include: SPEP/immunofix/SFLC previously WNL, PLAR neg   - DM was diagnosed post transplant, but current A1c is 10% on 1/25/22   - UA 8/21 and today showing trace RBC.    - Blood pressure controlled   - Does not use NSAIDs   - On Losartan    - Off CNI   - Reviewed with patient the gold standard for diagnosing his proteinuria would be renal bx. Reviewed the procedure with patient. He is understandably anxious/hesitant. We agreed to work on his diabetes control, increase his Losartan and re evaluate next visit. Although I strongly suspect a bx will be necessary    2. HTN- Controlled with edema. Clinic readings 123-131/64-76. No home readings. Left his device in [place]  Current regimen:    Amlodipine 10 mg every day   Losartan 50 mg every day   Metoprolol 75 mg bid    - Decrease Amlodipine to 5 mg every day    - Increase Losartan to 100 mg every day    - Recommended home monitoring if possible    3. DM2 - Uncontrolled. A1c 10%. Currently on Metformin 1000 mg bid, insulin and just starting Trulicity    - A1c goal is 7%   - Per PPI    4. Liver transplant IS: MMF    5. Electrolytes - No acute concerns. K 4.3 Na 138    6. Vit D def - Vit D level 12, PTH 83, Calcium 8.7 ( albumin 3.2), Phos 3.2   - Begin Vit D 2000 U every day    7. Disposition - RTC 2 months for follow up w/labs prior      Assessment and plan was discussed with patient and he voiced his understanding and agreement.    Reason for Visit:  Proteinuria    HPI:  Mr Limon  "is a 67 yo male with ETOH cirrhosis/HCC s/p Liver transplant 2018, Prostate cancer 4/19, DM2, Proteinuria, HTN, present today for proteinuria f/u. Last seen in clinic by Dr Eaton 7/23/21. PLAR was ordered to complete his serologic work up for proteinuria which was neg, In the interim his diabetes control has worsened and his UPCR is now 3.0 g/gCr.   Creat 0.7 range      ROS:   Patient reports he \"feels fine\"  Denies CP, dyspnea, abdominal concerns  No voiding concerns  No home blood pressures  Energy level and appetite are good  Has edema    Chronic Health Problems:    ETOH cirrhosis/HCC s/p Liver transplant 2018  Prostate cancer 4/19  DM2  Proteinuria  HTN  Immunosuppressed state  Afib with RVR  Hypothyroidism  Tobacco use d/o  Alcohol use d/o ( in remission)  TB ( latent, treated)  HLD    Family Hx:   Family History   Problem Relation Age of Onset     Memory loss Mother      Liver Disease Brother      Skin Cancer No family hx of      Melanoma No family hx of      Personal Hx:   Social History     Tobacco Use     Smoking status: Former Smoker     Packs/day: 0.03     Years: 20.00     Pack years: 0.60     Types: Cigarettes, Cigars     Start date: 8/14/1970     Quit date: 3/14/2018     Years since quitting: 3.9     Smokeless tobacco: Never Used     Tobacco comment: Quit smoking Feb 2018, one cigarette after dinner   Substance Use Topics     Alcohol use: No     Comment: Quit drinking Feb 2018       Allergies:  No Known Allergies    Medications:  Current Outpatient Medications   Medication Sig     amLODIPine (NORVASC) 5 MG tablet Take 1 tablet (5 mg) by mouth daily     atorvastatin (LIPITOR) 40 MG tablet Take 1 tablet (40 mg) by mouth daily     blood glucose (NO BRAND SPECIFIED) lancets standard Use to test blood sugar 4 times daily or as directed.     blood glucose (ONETOUCH VERIO IQ) test strip Use to test blood sugar 4 times daily or as directed.     blood glucose monitoring (NO BRAND SPECIFIED) meter device kit Use " "to test blood sugar 4 times daily or as directed. Please let the pt know when it is ready to .     dulaglutide (TRULICITY) 0.75 MG/0.5ML pen Inject 0.75 mg Subcutaneous every 7 days for 28 days, THEN 1.5 mg every 7 days.     glucose 40 % (400 mg/mL) gel Take 15-30 g by mouth every 15 minutes as needed for low blood sugar     insulin glargine (LANTUS PEN) 100 UNIT/ML pen Take 15 units in the morning and 15 units in the evening     insulin glargine (LANTUS SOLOSTAR) 100 UNIT/ML pen 12 Units Inject 14 units subcutaneous daily.     insulin pen needle (BD KIRK U/F) 32G X 4 MM miscellaneous Use 1 daily.     Lancets (ONETOUCH DELICA PLUS [id number]) MISC U TO TEST QID OR UTD     levothyroxine (SYNTHROID/LEVOTHROID) 125 MCG tablet Take 1 tablet (125 mcg) by mouth daily     levothyroxine (SYNTHROID/LEVOTHROID) 125 MCG tablet Take 1 tablet (125 mcg) by mouth daily     losartan (COZAAR) 50 MG tablet Take 2 tablets (100 mg) by mouth daily     metFORMIN (GLUCOPHAGE-XR) 500 MG 24 hr tablet Take 2 tablets (1,000 mg) by mouth 2 times daily (with meals)     Metoprolol Tartrate 75 MG TABS Take 1 tablet by mouth 2 times daily     mycophenolate (GENERIC EQUIVALENT) 250 MG capsule Take 3 capsules (750 mg) by mouth 2 times daily     Sharps Container MISC 1 each daily     ticagrelor (BRILINTA) 90 MG tablet Take 1 tablet (90 mg) by mouth 2 times daily     ursodiol (ACTIGALL) 250 MG tablet Take 1 tablet (250 mg) by mouth 2 times daily     No current facility-administered medications for this visit.      Vitals:  /76   Pulse 64   Ht 1.702 m (5' 7\")   Wt 91.3 kg (201 lb 3.2 oz)   SpO2 96%   BMI 31.51 kg/m      Exam:  GEN: Pleasant male in NAD  CARDIAC: RRR  LUNGS: CTA  ABDOMEN: Soft, Obese  EXT: 1+ edema  NEURO: A/O    LABS:   CMP  Recent Labs   Lab Test 03/02/22  1442 01/25/22  0851 10/13/21  1329 10/13/21  0905 09/13/21  0848 07/21/21  1804 07/07/21  1059 04/23/21  0755 03/24/21  0812 02/01/21  0723    137  --  137 " 137   < > 138 138 140 136   POTASSIUM 4.3 4.5  --  4.6 5.6*   < > 4.7 4.9 4.5 4.5   CHLORIDE 107 102  --  108 106   < > 106 108 106 104   CO2 27 28  --  26 28   < > 25 26 26 28   ANIONGAP 4 7  --  3 3   < > 6 4 7 5   * 263* 116* 145* 270*   < > 202* 200* 241* 296*   BUN 14 15  --  18 24   < > 26 16 24 23   CR 0.75 0.80  --  0.82 0.89   < > 0.79 0.84 0.86 0.75   GFRESTIMATED >90 >90  --  >90 88   < > >90 >90 89 >90   GFRESTBLACK  --   --   --   --   --   --  >90 >90 >90 >90   YELENA 8.7 8.7  --  8.7 8.6   < > 8.3* 8.3* 8.9 8.9    < > = values in this interval not displayed.     Recent Labs   Lab Test 01/25/22  0851 10/13/21  0905 09/21/21  1041 09/13/21  0848   BILITOTAL 0.8 0.6 1.4* 1.5*   ALKPHOS 326* 303* 428* 644*   ALT 49 47 84* 188*   AST 23 24 35 40     CBC  Recent Labs   Lab Test 03/02/22  1442 01/25/22  0851 10/13/21  0905 09/09/21  0837   HGB 14.0 14.5 14.5 13.6   WBC 5.8 7.3 5.4 5.5   RBC 5.02 5.12 4.87 4.72   HCT 42.8 44.4 42.9 41.4   MCV 85 87 88 88   MCH 27.9 28.3 29.8 28.8   MCHC 32.7 32.7 33.8 32.9   RDW 12.3 12.2 13.5 15.2*    222 218 252     URINE STUDIES  Recent Labs   Lab Test 03/02/22  1445 08/30/21  0943 07/21/21  1806 06/09/21  0945   COLOR Yellow Yellow Yellow Yellow   APPEARANCE Clear Clear Clear Clear   URINEGLC Negative Negative 50 * 150*   URINEBILI Negative Negative Negative Negative   URINEKETONE Negative Negative Negative Negative   SG 1.020 1.017 1.018 1.018   UBLD Trace* Small* Negative Negative   URINEPH 6.0 5.0 5.0 5.0   PROTEIN >2000* 100 * >499* 100*   NITRITE Negative Negative Negative Negative   LEUKEST Negative Negative Negative Negative   RBCU 2 3* 2 1   WBCU 1 1 1 1     Recent Labs   Lab Test 03/02/22  1445 08/30/21  0943 07/21/21  1806 04/23/21  0815   UTPG 3.04* 2.83* 1.40* 2.88*     PTH  Recent Labs   Lab Test 03/02/22  1442   PTHI 83*     IRON STUDIES  Recent Labs   Lab Test 07/19/18  1132   IRON 141   *   IRONSAT 68*       Jacqueline Juarez, NP

## 2022-03-02 NOTE — LETTER
3/2/2022      RE: Loy Limon  [address]  [facility] [address]       Nephrology Clinic Visit 3/2/22    Assessment and Plan:    1. Proteinuria - Worsening. UPCR 3.0 g/gCr in setting of uncontrolled DM.    - Current evaluation for proteinuria include: SPEP/immunofix/SFLC previously WNL, PLAR neg   - DM was diagnosed post transplant, but current A1c is 10% on 1/25/22   - UA 8/21 and today showing trace RBC.    - Blood pressure controlled   - Does not use NSAIDs   - On Losartan    - Off CNI   - Reviewed with patient the gold standard for diagnosing his proteinuria would be renal bx. Reviewed the procedure with patient. He is understandably anxious/hesitant. We agreed to work on his diabetes control, increase his Losartan and re evaluate next visit. Although I strongly suspect a bx will be necessary    2. HTN- Controlled with edema. Clinic readings 123-131/64-76. No home readings. Left his device in [place]  Current regimen:    Amlodipine 10 mg every day   Losartan 50 mg every day   Metoprolol 75 mg bid    - Decrease Amlodipine to 5 mg every day    - Increase Losartan to 100 mg every day    - Recommended home monitoring if possible    3. DM2 - Uncontrolled. A1c 10%. Currently on Metformin 1000 mg bid, insulin and just starting Trulicity    - A1c goal is 7%   - Per PPI    4. Liver transplant IS: [facility]F    5. Electrolytes - No acute concerns. K 4.3 Na 138    6. Vit D def - Vit D level 12, PTH 83, Calcium 8.7 ( albumin 3.2), Phos 3.2   - Begin Vit D 2000 U every day    7. Disposition - RTC 2 months for follow up w/labs prior      Assessment and plan was discussed with patient and he voiced his understanding and agreement.    Reason for Visit:  Proteinuria    HPI:  Mr Limon is a 65 yo male with ETOH cirrhosis/HCC s/p Liver transplant 2018, Prostate cancer 4/19, DM2, Proteinuria, HTN, present today for proteinuria f/u. Last seen in clinic by Dr Eaton 7/23/21. PLAR was ordered to complete his serologic  "work up for proteinuria which was neg, In the interim his diabetes control has worsened and his UPCR is now 3.0 g/gCr.   Creat 0.7 range      ROS:   Patient reports he \"feels fine\"  Denies CP, dyspnea, abdominal concerns  No voiding concerns  No home blood pressures  Energy level and appetite are good  Has edema    Chronic Health Problems:    ETOH cirrhosis/HCC s/p Liver transplant 2018  Prostate cancer 4/19  DM2  Proteinuria  HTN  Immunosuppressed state  Afib with RVR  Hypothyroidism  Tobacco use d/o  Alcohol use d/o ( in remission)  TB ( latent, treated)  HLD    Family Hx:   Family History   Problem Relation Age of Onset     Memory loss Mother      Liver Disease Brother      Skin Cancer No family hx of      Melanoma No family hx of      Personal Hx:   Social History     Tobacco Use     Smoking status: Former Smoker     Packs/day: 0.03     Years: 20.00     Pack years: 0.60     Types: Cigarettes, Cigars     Start date: 8/14/1970     Quit date: 3/14/2018     Years since quitting: 3.9     Smokeless tobacco: Never Used     Tobacco comment: Quit smoking Feb 2018, one cigarette after dinner   Substance Use Topics     Alcohol use: No     Comment: Quit drinking Feb 2018       Allergies:  No Known Allergies    Medications:  Current Outpatient Medications   Medication Sig     amLODIPine (NORVASC) 5 MG tablet Take 1 tablet (5 mg) by mouth daily     atorvastatin (LIPITOR) 40 MG tablet Take 1 tablet (40 mg) by mouth daily     blood glucose (NO BRAND SPECIFIED) lancets standard Use to test blood sugar 4 times daily or as directed.     blood glucose (ONETOUCH VERIO IQ) test strip Use to test blood sugar 4 times daily or as directed.     blood glucose monitoring (NO BRAND SPECIFIED) meter device kit Use to test blood sugar 4 times daily or as directed. Please let the pt know when it is ready to .     dulaglutide (TRULICITY) 0.75 MG/0.5ML pen Inject 0.75 mg Subcutaneous every 7 days for 28 days, THEN 1.5 mg every 7 days.     " "glucose 40 % (400 mg/mL) gel Take 15-30 g by mouth every 15 minutes as needed for low blood sugar     insulin glargine (LANTUS PEN) 100 UNIT/ML pen Take 15 units in the morning and 15 units in the evening     insulin glargine (LANTUS SOLOSTAR) 100 UNIT/ML pen 12 Units Inject 14 units subcutaneous daily.     insulin pen needle (BD KIRK U/F) 32G X 4 MM miscellaneous Use 1 daily.     Lancets (ONETOUCH DELICA PLUS [id number]) MISC U TO TEST QID OR UTD     levothyroxine (SYNTHROID/LEVOTHROID) 125 MCG tablet Take 1 tablet (125 mcg) by mouth daily     levothyroxine (SYNTHROID/LEVOTHROID) 125 MCG tablet Take 1 tablet (125 mcg) by mouth daily     losartan (COZAAR) 50 MG tablet Take 2 tablets (100 mg) by mouth daily     metFORMIN (GLUCOPHAGE-XR) 500 MG 24 hr tablet Take 2 tablets (1,000 mg) by mouth 2 times daily (with meals)     Metoprolol Tartrate 75 MG TABS Take 1 tablet by mouth 2 times daily     mycophenolate (GENERIC EQUIVALENT) 250 MG capsule Take 3 capsules (750 mg) by mouth 2 times daily     Sharps Container MISC 1 each daily     ticagrelor (BRILINTA) 90 MG tablet Take 1 tablet (90 mg) by mouth 2 times daily     ursodiol (ACTIGALL) 250 MG tablet Take 1 tablet (250 mg) by mouth 2 times daily     No current facility-administered medications for this visit.      Vitals:  /76   Pulse 64   Ht 1.702 m (5' 7\")   Wt 91.3 kg (201 lb 3.2 oz)   SpO2 96%   BMI 31.51 kg/m      Exam:  GEN: Pleasant male in NAD  CARDIAC: RRR  LUNGS: CTA  ABDOMEN: Soft, Obese  EXT: 1+ edema  NEURO: A/O    LABS:   CMP  Recent Labs   Lab Test 03/02/22  1442 01/25/22  0851 10/13/21  1329 10/13/21  0905 09/13/21  0848 07/21/21  1804 07/07/21  1059 04/23/21  0755 03/24/21  0812 02/01/21  0723    137  --  137 137   < > 138 138 140 136   POTASSIUM 4.3 4.5  --  4.6 5.6*   < > 4.7 4.9 4.5 4.5   CHLORIDE 107 102  --  108 106   < > 106 108 106 104   CO2 27 28  --  26 28   < > 25 26 26 28   ANIONGAP 4 7  --  3 3   < > 6 4 7 5   * 263* " 116* 145* 270*   < > 202* 200* 241* 296*   BUN 14 15  --  18 24   < > 26 16 24 23   CR 0.75 0.80  --  0.82 0.89   < > 0.79 0.84 0.86 0.75   GFRESTIMATED >90 >90  --  >90 88   < > >90 >90 89 >90   GFRESTBLACK  --   --   --   --   --   --  >90 >90 >90 >90   YELENA 8.7 8.7  --  8.7 8.6   < > 8.3* 8.3* 8.9 8.9    < > = values in this interval not displayed.     Recent Labs   Lab Test 01/25/22  0851 10/13/21  0905 09/21/21  1041 09/13/21  0848   BILITOTAL 0.8 0.6 1.4* 1.5*   ALKPHOS 326* 303* 428* 644*   ALT 49 47 84* 188*   AST 23 24 35 40     CBC  Recent Labs   Lab Test 03/02/22  1442 01/25/22  0851 10/13/21  0905 09/09/21  0837   HGB 14.0 14.5 14.5 13.6   WBC 5.8 7.3 5.4 5.5   RBC 5.02 5.12 4.87 4.72   HCT 42.8 44.4 42.9 41.4   MCV 85 87 88 88   MCH 27.9 28.3 29.8 28.8   MCHC 32.7 32.7 33.8 32.9   RDW 12.3 12.2 13.5 15.2*    222 218 252     URINE STUDIES  Recent Labs   Lab Test 03/02/22  1445 08/30/21  0943 07/21/21  1806 06/09/21  0945   COLOR Yellow Yellow Yellow Yellow   APPEARANCE Clear Clear Clear Clear   URINEGLC Negative Negative 50 * 150*   URINEBILI Negative Negative Negative Negative   URINEKETONE Negative Negative Negative Negative   SG 1.020 1.017 1.018 1.018   UBLD Trace* Small* Negative Negative   URINEPH 6.0 5.0 5.0 5.0   PROTEIN >2000* 100 * >499* 100*   NITRITE Negative Negative Negative Negative   LEUKEST Negative Negative Negative Negative   RBCU 2 3* 2 1   WBCU 1 1 1 1     Recent Labs   Lab Test 03/02/22  1445 08/30/21  0943 07/21/21  1806 04/23/21  0815   UTPG 3.04* 2.83* 1.40* 2.88*     PTH  Recent Labs   Lab Test 03/02/22  1442   PTHI 83*     IRON STUDIES  Recent Labs   Lab Test 07/19/18  1132   IRON 141   *   IRONSAT 68*       Jacqueline Juarez, FIFI Juarez, NP

## 2022-03-02 NOTE — NURSING NOTE
"Chief Complaint   Patient presents with     RECHECK     CKD Stage 2     /76   Pulse 64   Ht 1.702 m (5' 7\")   Wt 91.3 kg (201 lb 3.2 oz)   SpO2 96%   BMI 31.51 kg/m      Tony Rapp MA  "

## 2022-03-03 ENCOUNTER — TELEPHONE (OUTPATIENT)
Dept: NEPHROLOGY | Facility: CLINIC | Age: 67
End: 2022-03-03
Payer: COMMERCIAL

## 2022-03-03 RX ORDER — VITAMIN B COMPLEX
50 TABLET ORAL DAILY
Qty: 180 TABLET | Refills: 3 | Status: SHIPPED | OUTPATIENT
Start: 2022-03-03 | End: 2022-05-04

## 2022-03-03 NOTE — TELEPHONE ENCOUNTER
Jacqueline Juarez NP Stechmann, Kathleen, RN  Kathrin can you call and check on b/ps next week?   I increased his losartan to 100 mg every day and decreased his amlodipine to 5 mg every day   Also I prescribed Vit D 50 mcg every day for Vit d def. I already sent the RX. Could you ask him to begin and continue until told otherwise   Thanks   Nai     Will call patient next week for BP check.   Called patient via  (ID # [id number]). Discussed above medication with patient and he is aware it is at the pharmacy. Patient had questions about making follow up appointment. Message sent to schedulers.

## 2022-03-03 NOTE — PROGRESS NOTES
Nephrology Clinic Visit 3/2/22    Assessment and Plan:    1. Proteinuria - Worsening. UPCR 3.0 g/gCr in setting of uncontrolled DM.    - Current evaluation for proteinuria include: SPEP/immunofix/SFLC previously WNL, PLAR neg   - DM was diagnosed post transplant, but current A1c is 10% on 1/25/22   - UA 8/21 and today showing trace RBC.    - Blood pressure controlled   - Does not use NSAIDs   - On Losartan    - Off CNI   - Reviewed with patient the gold standard for diagnosing his proteinuria would be renal bx. Reviewed the procedure with patient. He is understandably anxious/hesitant. We agreed to work on his diabetes control, increase his Losartan and re evaluate next visit. Although I strongly suspect a bx will be necessary    2. HTN- Controlled with edema. Clinic readings 123-131/64-76. No home readings. Left his device in [place]  Current regimen:    Amlodipine 10 mg every day   Losartan 50 mg every day   Metoprolol 75 mg bid    - Decrease Amlodipine to 5 mg every day    - Increase Losartan to 100 mg every day    - Recommended home monitoring if possible    3. DM2 - Uncontrolled. A1c 10%. Currently on Metformin 1000 mg bid, insulin and just starting Trulicity    - A1c goal is 7%   - Per PPI    4. Liver transplant IS: [facility]    5. Electrolytes - No acute concerns. K 4.3 Na 138    6. Vit D def - Vit D level 12, PTH 83, Calcium 8.7 ( albumin 3.2), Phos 3.2   - Begin Vit D 2000 U every day    7. Disposition - RTC 2 months for follow up w/labs prior      Assessment and plan was discussed with patient and he voiced his understanding and agreement.    Reason for Visit:  Proteinuria    HPI:  Mr Limon is a 65 yo male with ETOH cirrhosis/HCC s/p Liver transplant 2018, Prostate cancer 4/19, DM2, Proteinuria, HTN, present today for proteinuria f/u. Last seen in clinic by Dr Eaton 7/23/21. PLAR was ordered to complete his serologic work up for proteinuria which was neg, In the interim his diabetes control has worsened and his  "UPCR is now 3.0 g/gCr.   Creat 0.7 range      ROS:   Patient reports he \"feels fine\"  Denies CP, dyspnea, abdominal concerns  No voiding concerns  No home blood pressures  Energy level and appetite are good  Has edema    Chronic Health Problems:    ETOH cirrhosis/HCC s/p Liver transplant 2018  Prostate cancer 4/19  DM2  Proteinuria  HTN  Immunosuppressed state  Afib with RVR  Hypothyroidism  Tobacco use d/o  Alcohol use d/o ( in remission)  TB ( latent, treated)  HLD    Family Hx:   Family History   Problem Relation Age of Onset     Memory loss Mother      Liver Disease Brother      Skin Cancer No family hx of      Melanoma No family hx of      Personal Hx:   Social History     Tobacco Use     Smoking status: Former Smoker     Packs/day: 0.03     Years: 20.00     Pack years: 0.60     Types: Cigarettes, Cigars     Start date: 8/14/1970     Quit date: 3/14/2018     Years since quitting: 3.9     Smokeless tobacco: Never Used     Tobacco comment: Quit smoking Feb 2018, one cigarette after dinner   Substance Use Topics     Alcohol use: No     Comment: Quit drinking Feb 2018       Allergies:  No Known Allergies    Medications:  Current Outpatient Medications   Medication Sig     amLODIPine (NORVASC) 5 MG tablet Take 1 tablet (5 mg) by mouth daily     atorvastatin (LIPITOR) 40 MG tablet Take 1 tablet (40 mg) by mouth daily     blood glucose (NO BRAND SPECIFIED) lancets standard Use to test blood sugar 4 times daily or as directed.     blood glucose (ONETOUCH VERIO IQ) test strip Use to test blood sugar 4 times daily or as directed.     blood glucose monitoring (NO BRAND SPECIFIED) meter device kit Use to test blood sugar 4 times daily or as directed. Please let the pt know when it is ready to .     dulaglutide (TRULICITY) 0.75 MG/0.5ML pen Inject 0.75 mg Subcutaneous every 7 days for 28 days, THEN 1.5 mg every 7 days.     glucose 40 % (400 mg/mL) gel Take 15-30 g by mouth every 15 minutes as needed for low blood " "sugar     insulin glargine (LANTUS PEN) 100 UNIT/ML pen Take 15 units in the morning and 15 units in the evening     insulin glargine (LANTUS SOLOSTAR) 100 UNIT/ML pen 12 Units Inject 14 units subcutaneous daily.     insulin pen needle (BD KIRK U/F) 32G X 4 MM miscellaneous Use 1 daily.     Lancets (ONETOUCH DELICA PLUS [id number]) MISC U TO TEST QID OR UTD     levothyroxine (SYNTHROID/LEVOTHROID) 125 MCG tablet Take 1 tablet (125 mcg) by mouth daily     levothyroxine (SYNTHROID/LEVOTHROID) 125 MCG tablet Take 1 tablet (125 mcg) by mouth daily     losartan (COZAAR) 50 MG tablet Take 2 tablets (100 mg) by mouth daily     metFORMIN (GLUCOPHAGE-XR) 500 MG 24 hr tablet Take 2 tablets (1,000 mg) by mouth 2 times daily (with meals)     Metoprolol Tartrate 75 MG TABS Take 1 tablet by mouth 2 times daily     mycophenolate (GENERIC EQUIVALENT) 250 MG capsule Take 3 capsules (750 mg) by mouth 2 times daily     Sharps Container MISC 1 each daily     ticagrelor (BRILINTA) 90 MG tablet Take 1 tablet (90 mg) by mouth 2 times daily     ursodiol (ACTIGALL) 250 MG tablet Take 1 tablet (250 mg) by mouth 2 times daily     No current facility-administered medications for this visit.      Vitals:  /76   Pulse 64   Ht 1.702 m (5' 7\")   Wt 91.3 kg (201 lb 3.2 oz)   SpO2 96%   BMI 31.51 kg/m      Exam:  GEN: Pleasant male in NAD  CARDIAC: RRR  LUNGS: CTA  ABDOMEN: Soft, Obese  EXT: 1+ edema  NEURO: A/O    LABS:   CMP  Recent Labs   Lab Test 03/02/22  1442 01/25/22  0851 10/13/21  1329 10/13/21  0905 09/13/21  0848 07/21/21  1804 07/07/21  1059 04/23/21  0755 03/24/21  0812 02/01/21  0723    137  --  137 137   < > 138 138 140 136   POTASSIUM 4.3 4.5  --  4.6 5.6*   < > 4.7 4.9 4.5 4.5   CHLORIDE 107 102  --  108 106   < > 106 108 106 104   CO2 27 28  --  26 28   < > 25 26 26 28   ANIONGAP 4 7  --  3 3   < > 6 4 7 5   * 263* 116* 145* 270*   < > 202* 200* 241* 296*   BUN 14 15  --  18 24   < > 26 16 24 23   CR 0.75 " 0.80  --  0.82 0.89   < > 0.79 0.84 0.86 0.75   GFRESTIMATED >90 >90  --  >90 88   < > >90 >90 89 >90   GFRESTBLACK  --   --   --   --   --   --  >90 >90 >90 >90   YELENA 8.7 8.7  --  8.7 8.6   < > 8.3* 8.3* 8.9 8.9    < > = values in this interval not displayed.     Recent Labs   Lab Test 01/25/22  0851 10/13/21  0905 09/21/21  1041 09/13/21  0848   BILITOTAL 0.8 0.6 1.4* 1.5*   ALKPHOS 326* 303* 428* 644*   ALT 49 47 84* 188*   AST 23 24 35 40     CBC  Recent Labs   Lab Test 03/02/22  1442 01/25/22  0851 10/13/21  0905 09/09/21  0837   HGB 14.0 14.5 14.5 13.6   WBC 5.8 7.3 5.4 5.5   RBC 5.02 5.12 4.87 4.72   HCT 42.8 44.4 42.9 41.4   MCV 85 87 88 88   MCH 27.9 28.3 29.8 28.8   MCHC 32.7 32.7 33.8 32.9   RDW 12.3 12.2 13.5 15.2*    222 218 252     URINE STUDIES  Recent Labs   Lab Test 03/02/22  1445 08/30/21  0943 07/21/21  1806 06/09/21  0945   COLOR Yellow Yellow Yellow Yellow   APPEARANCE Clear Clear Clear Clear   URINEGLC Negative Negative 50 * 150*   URINEBILI Negative Negative Negative Negative   URINEKETONE Negative Negative Negative Negative   SG 1.020 1.017 1.018 1.018   UBLD Trace* Small* Negative Negative   URINEPH 6.0 5.0 5.0 5.0   PROTEIN >2000* 100 * >499* 100*   NITRITE Negative Negative Negative Negative   LEUKEST Negative Negative Negative Negative   RBCU 2 3* 2 1   WBCU 1 1 1 1     Recent Labs   Lab Test 03/02/22  1445 08/30/21  0943 07/21/21  1806 04/23/21  0815   UTPG 3.04* 2.83* 1.40* 2.88*     PTH  Recent Labs   Lab Test 03/02/22  1442   PTHI 83*     IRON STUDIES  Recent Labs   Lab Test 07/19/18  1132   IRON 141   *   IRONSAT 68*       Jacqueline Juarez, NP

## 2022-03-07 ENCOUNTER — LAB (OUTPATIENT)
Dept: LAB | Facility: CLINIC | Age: 67
End: 2022-03-07
Payer: COMMERCIAL

## 2022-03-07 ENCOUNTER — ANCILLARY PROCEDURE (OUTPATIENT)
Dept: MRI IMAGING | Facility: CLINIC | Age: 67
End: 2022-03-07
Attending: PHYSICIAN ASSISTANT
Payer: COMMERCIAL

## 2022-03-07 DIAGNOSIS — C22.0 HCC (HEPATOCELLULAR CARCINOMA) (H): ICD-10-CM

## 2022-03-07 DIAGNOSIS — Z13.220 LIPID SCREENING: ICD-10-CM

## 2022-03-07 DIAGNOSIS — Z94.4 LIVER REPLACED BY TRANSPLANT (H): ICD-10-CM

## 2022-03-07 LAB
AFP SERPL-MCNC: <1.5 UG/L (ref 0–8)
ALBUMIN SERPL-MCNC: 3.5 G/DL (ref 3.4–5)
ALP SERPL-CCNC: 242 U/L (ref 40–150)
ALT SERPL W P-5'-P-CCNC: 32 U/L (ref 0–70)
ANION GAP SERPL CALCULATED.3IONS-SCNC: 8 MMOL/L (ref 3–14)
AST SERPL W P-5'-P-CCNC: 19 U/L (ref 0–45)
BASOPHILS # BLD AUTO: 0 10E3/UL (ref 0–0.2)
BASOPHILS NFR BLD AUTO: 0 %
BILIRUB SERPL-MCNC: 0.9 MG/DL (ref 0.2–1.3)
BUN SERPL-MCNC: 18 MG/DL (ref 7–30)
CALCIUM SERPL-MCNC: 8.8 MG/DL (ref 8.5–10.1)
CHLORIDE BLD-SCNC: 104 MMOL/L (ref 94–109)
CO2 SERPL-SCNC: 27 MMOL/L (ref 20–32)
CREAT SERPL-MCNC: 0.9 MG/DL (ref 0.66–1.25)
EOSINOPHIL # BLD AUTO: 0.2 10E3/UL (ref 0–0.7)
EOSINOPHIL NFR BLD AUTO: 3 %
ERYTHROCYTE [DISTWIDTH] IN BLOOD BY AUTOMATED COUNT: 12.5 % (ref 10–15)
GFR SERPL CREATININE-BSD FRML MDRD: >90 ML/MIN/1.73M2
GLUCOSE BLD-MCNC: 108 MG/DL (ref 70–99)
HCT VFR BLD AUTO: 46.1 % (ref 40–53)
HGB BLD-MCNC: 15 G/DL (ref 13.3–17.7)
IMM GRANULOCYTES # BLD: 0 10E3/UL
IMM GRANULOCYTES NFR BLD: 0 %
LYMPHOCYTES # BLD AUTO: 1.8 10E3/UL (ref 0.8–5.3)
LYMPHOCYTES NFR BLD AUTO: 26 %
MCH RBC QN AUTO: 28 PG (ref 26.5–33)
MCHC RBC AUTO-ENTMCNC: 32.5 G/DL (ref 31.5–36.5)
MCV RBC AUTO: 86 FL (ref 78–100)
MONOCYTES # BLD AUTO: 0.4 10E3/UL (ref 0–1.3)
MONOCYTES NFR BLD AUTO: 6 %
NEUTROPHILS # BLD AUTO: 4.7 10E3/UL (ref 1.6–8.3)
NEUTROPHILS NFR BLD AUTO: 65 %
NRBC # BLD AUTO: 0 10E3/UL
NRBC BLD AUTO-RTO: 0 /100
PLATELET # BLD AUTO: 247 10E3/UL (ref 150–450)
POTASSIUM BLD-SCNC: 4.8 MMOL/L (ref 3.4–5.3)
PROT SERPL-MCNC: 7.5 G/DL (ref 6.8–8.8)
RBC # BLD AUTO: 5.35 10E6/UL (ref 4.4–5.9)
SODIUM SERPL-SCNC: 139 MMOL/L (ref 133–144)
WBC # BLD AUTO: 7.2 10E3/UL (ref 4–11)

## 2022-03-07 PROCEDURE — A9585 GADOBUTROL INJECTION: HCPCS | Performed by: STUDENT IN AN ORGANIZED HEALTH CARE EDUCATION/TRAINING PROGRAM

## 2022-03-07 PROCEDURE — 80053 COMPREHEN METABOLIC PANEL: CPT | Performed by: PATHOLOGY

## 2022-03-07 PROCEDURE — 36415 COLL VENOUS BLD VENIPUNCTURE: CPT | Performed by: PATHOLOGY

## 2022-03-07 PROCEDURE — 85025 COMPLETE CBC W/AUTO DIFF WBC: CPT | Performed by: PATHOLOGY

## 2022-03-07 PROCEDURE — 74183 MRI ABD W/O CNTR FLWD CNTR: CPT | Performed by: STUDENT IN AN ORGANIZED HEALTH CARE EDUCATION/TRAINING PROGRAM

## 2022-03-07 PROCEDURE — 82105 ALPHA-FETOPROTEIN SERUM: CPT | Mod: 90 | Performed by: PATHOLOGY

## 2022-03-07 PROCEDURE — 99000 SPECIMEN HANDLING OFFICE-LAB: CPT | Performed by: PATHOLOGY

## 2022-03-07 RX ORDER — GADOBUTROL 604.72 MG/ML
10 INJECTION INTRAVENOUS ONCE
Status: COMPLETED | OUTPATIENT
Start: 2022-03-07 | End: 2022-03-07

## 2022-03-07 RX ADMIN — GADOBUTROL 10 ML: 604.72 INJECTION INTRAVENOUS at 14:46

## 2022-03-11 ENCOUNTER — TELEPHONE (OUTPATIENT)
Dept: NEPHROLOGY | Facility: CLINIC | Age: 67
End: 2022-03-11
Payer: COMMERCIAL

## 2022-03-11 NOTE — TELEPHONE ENCOUNTER
Call to patient for update on blood pressure. Nai increased losartan to 100 mg every day and decreased his amlodipine to 5 mg every day.         3/11-Called via  ( ID: [id number]).  Did not reach patient, will try again next week.  3/14-  Called via  ( ID: [id number]). Left message for patient to return call.   3/18- Unable to reach patient.     Will notify Nai.

## 2022-03-18 DIAGNOSIS — E11.65 UNCONTROLLED TYPE 2 DIABETES MELLITUS WITH HYPERGLYCEMIA (H): ICD-10-CM

## 2022-03-18 NOTE — TELEPHONE ENCOUNTER
Jacqueline Juarez NP Stechmann, Kathleen, RN  Caller: Unspecified (1 week ago,  3:38 PM)  No further contact by you Kathrin. You've tried and given him the number to call back   Thanks   Nai

## 2022-03-23 ENCOUNTER — LAB (OUTPATIENT)
Dept: LAB | Facility: CLINIC | Age: 67
End: 2022-03-23
Payer: COMMERCIAL

## 2022-03-23 ENCOUNTER — OFFICE VISIT (OUTPATIENT)
Dept: GASTROENTEROLOGY | Facility: CLINIC | Age: 67
End: 2022-03-23
Attending: INTERNAL MEDICINE
Payer: COMMERCIAL

## 2022-03-23 ENCOUNTER — TELEPHONE (OUTPATIENT)
Dept: TRANSPLANT | Facility: CLINIC | Age: 67
End: 2022-03-23

## 2022-03-23 VITALS
WEIGHT: 201.1 LBS | SYSTOLIC BLOOD PRESSURE: 147 MMHG | TEMPERATURE: 98.1 F | HEART RATE: 61 BPM | DIASTOLIC BLOOD PRESSURE: 78 MMHG | OXYGEN SATURATION: 99 % | BODY MASS INDEX: 31.5 KG/M2

## 2022-03-23 DIAGNOSIS — Z94.4 LIVER TRANSPLANTED (H): ICD-10-CM

## 2022-03-23 DIAGNOSIS — Z94.4 LIVER TRANSPLANT RECIPIENT (H): ICD-10-CM

## 2022-03-23 DIAGNOSIS — Z94.4 LIVER TRANSPLANTED (H): Primary | ICD-10-CM

## 2022-03-23 DIAGNOSIS — Z94.4 LIVER REPLACED BY TRANSPLANT (H): ICD-10-CM

## 2022-03-23 DIAGNOSIS — Z94.4 LIVER TRANSPLANT RECIPIENT (H): Primary | ICD-10-CM

## 2022-03-23 DIAGNOSIS — Z13.220 LIPID SCREENING: ICD-10-CM

## 2022-03-23 LAB
ALBUMIN SERPL-MCNC: 3.1 G/DL (ref 3.4–5)
ALP SERPL-CCNC: 223 U/L (ref 40–150)
ALT SERPL W P-5'-P-CCNC: 29 U/L (ref 0–70)
ANION GAP SERPL CALCULATED.3IONS-SCNC: 7 MMOL/L (ref 3–14)
AST SERPL W P-5'-P-CCNC: 19 U/L (ref 0–45)
BILIRUB DIRECT SERPL-MCNC: 0.2 MG/DL (ref 0–0.2)
BILIRUB SERPL-MCNC: 0.6 MG/DL (ref 0.2–1.3)
BUN SERPL-MCNC: 17 MG/DL (ref 7–30)
CALCIUM SERPL-MCNC: 8.2 MG/DL (ref 8.5–10.1)
CHLORIDE BLD-SCNC: 108 MMOL/L (ref 94–109)
CO2 SERPL-SCNC: 27 MMOL/L (ref 20–32)
CREAT SERPL-MCNC: 0.91 MG/DL (ref 0.66–1.25)
ERYTHROCYTE [DISTWIDTH] IN BLOOD BY AUTOMATED COUNT: 12.6 % (ref 10–15)
GFR SERPL CREATININE-BSD FRML MDRD: >90 ML/MIN/1.73M2
GLUCOSE BLD-MCNC: 148 MG/DL (ref 70–99)
HCT VFR BLD AUTO: 42.3 % (ref 40–53)
HGB BLD-MCNC: 13.9 G/DL (ref 13.3–17.7)
HOLD SPECIMEN: NORMAL
MCH RBC QN AUTO: 28 PG (ref 26.5–33)
MCHC RBC AUTO-ENTMCNC: 32.9 G/DL (ref 31.5–36.5)
MCV RBC AUTO: 85 FL (ref 78–100)
PLATELET # BLD AUTO: 234 10E3/UL (ref 150–450)
POTASSIUM BLD-SCNC: 4.2 MMOL/L (ref 3.4–5.3)
PROT SERPL-MCNC: 6.9 G/DL (ref 6.8–8.8)
RBC # BLD AUTO: 4.96 10E6/UL (ref 4.4–5.9)
SODIUM SERPL-SCNC: 142 MMOL/L (ref 133–144)
TACROLIMUS BLD-MCNC: <1 UG/L (ref 5–15)
TME LAST DOSE: ABNORMAL H
TME LAST DOSE: ABNORMAL H
WBC # BLD AUTO: 6.3 10E3/UL (ref 4–11)

## 2022-03-23 PROCEDURE — 80197 ASSAY OF TACROLIMUS: CPT | Mod: 90 | Performed by: PATHOLOGY

## 2022-03-23 PROCEDURE — 85027 COMPLETE CBC AUTOMATED: CPT | Performed by: PATHOLOGY

## 2022-03-23 PROCEDURE — G0463 HOSPITAL OUTPT CLINIC VISIT: HCPCS

## 2022-03-23 PROCEDURE — 36415 COLL VENOUS BLD VENIPUNCTURE: CPT | Performed by: PATHOLOGY

## 2022-03-23 PROCEDURE — 82248 BILIRUBIN DIRECT: CPT | Performed by: PATHOLOGY

## 2022-03-23 PROCEDURE — 99214 OFFICE O/P EST MOD 30 MIN: CPT | Performed by: INTERNAL MEDICINE

## 2022-03-23 PROCEDURE — 99000 SPECIMEN HANDLING OFFICE-LAB: CPT | Performed by: PATHOLOGY

## 2022-03-23 PROCEDURE — 80053 COMPREHEN METABOLIC PANEL: CPT | Performed by: PATHOLOGY

## 2022-03-23 ASSESSMENT — PAIN SCALES - GENERAL: PAINLEVEL: NO PAIN (0)

## 2022-03-23 NOTE — PROGRESS NOTES
Chief Complaint   Patient presents with     RECHECK       BP (!) 147/78   Pulse 61   Temp 98.1  F (36.7  C) (Oral)   Wt 91.2 kg (201 lb 1.6 oz)   SpO2 99%   BMI 31.50 kg/m      Panfilo Koenig MA on 3/23/2022 at 7:55 AM

## 2022-03-23 NOTE — TELEPHONE ENCOUNTER
dulaglutide (TRULICITY) 0.75 MG/0.5ML pen      Last Written Prescription Date:  2/15/22  Last Fill Quantity: 2 ml,   # refills: 0  Last Office Visit : 2/15/22 recommended 3 month follow up  Future Office visit:  None scheduled    Routing refill request to provider for review/approval because:  Last prescribed limited quantity, no refills  No changes made to requested refill

## 2022-03-23 NOTE — NURSING NOTE
Chief Complaint   Patient presents with     RECHECK       BP (!) 147/78   Pulse 61   Temp 98.1  F (36.7  C) (Oral)   Wt 91.2 kg (201 lb 1.6 oz)   SpO2 99%   BMI 31.50 kg/m      Panfilo Koenig MA on 3/23/2022 at 7:53 AM

## 2022-03-23 NOTE — PROGRESS NOTES
[facility] Hepatology    Follow-up Visit    CHIEF COMPLAINT AND REASON FOR THE VISIT:  Status post liver transplantation.    CONSULTING HEALTHCARE PROVIDER:  Jaswinder Anne MD    SUBJECTIVE:  Loy Limon is a 66-year-old male who is originally from [place].  He communicates usually in the healthcare system through an , which in this case, we used.  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  He had the HCC diagnosed in 03/2018 and he received the liver transplantation on 12/22/2018.  He had, prior to him getting the liver transplantation, a microwave ablation of the liver lesion, but on the explant he had a viable tumor and we followed him here with us and also with Oncology.      So far, he did not show any signs of recurrence.  He has an appointment with Oncology tomorrow also.  Mr. Limon also on our followup here had prostate cancer, and this was treated by Urology.  Neither has that had any signs of recurrence also.      Today he tells me that he has gained a significant amount of weight from his lowest of 187 to 201 now, and this is within 6 months.  He also claims that he does not have any significant fluid retention like edema.  He has problems with dyspnea on exertion after 10 minutes of walking.  He has seen Cardiology and still follows with them.  He tells me that he has at times felt palpitations.      He denies any abdominal pain, nausea, vomiting.  He is moving his bowels at least once a day, and he has done colonoscopy before transplantation and is up to date with it.  As far as the COVID vaccination is concerned, he had done the Pfizer and he finished the recommended dose.      Medical hx Surgical hx   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/28/2018     BPH (benign prostatic hyperplasia)      Cancer (H)     hepatocellualr carcinoma     Cirrhosis of  liver (H) 2018     Diabetes (H)      Enlarged prostate      Hypothyroidism      Impotence of organic origin 2020     Inguinal hernia     Repaired with mesh on 18     Liver lesion 2018     Liver transplanted (H) 2018     donor liver transplant     Portal vein thrombosis     on path explant     Postoperative atrial fibrillation (H) 2018     Prostate cancer (H)      TB lung, latent     Treated       Past Surgical History:   Procedure Laterality Date     APPENDECTOMY       COLONOSCOPY           CV CORONARY ANGIOGRAM N/A 10/13/2021    Procedure: CV CORONARY ANGIOGRAM;  Surgeon: Yaya Hampton MD;  Location:  HEART CARDIAC CATH LAB     CV CORONARY LITHOTRIPSY PCI N/A 10/13/2021    Procedure: CV Coronary Lithotripsy PCI;  Surgeon: Yaya Hampton MD;  Location:  HEART CARDIAC CATH LAB     CV INSTANTANEOUS WAVE-FREE RATIO N/A 10/13/2021    Procedure: Instantaneous Wave-Free Ratio;  Surgeon: Yaya Hampton MD;  Location:  HEART CARDIAC CATH LAB     CV INTRAVASULAR ULTRASOUND N/A 10/13/2021    Procedure: Intravascular Ultrasound;  Surgeon: Yaya Hampton MD;  Location:  HEART CARDIAC CATH LAB     CV PCI STENT DRUG ELUTING N/A 10/13/2021    Procedure: Percutaneous Coronary Intervention Stent Drug Eluting;  Surgeon: Yaya Hampton MD;  Location:  HEART CARDIAC CATH LAB     DAVINCI PROSTATECTOMY N/A 1/3/2020    Procedure: Robot-assisted laparoscopic radical prostatectomy, Bladder biopsy;  Surgeon: Kenrick Casiano MD;  Location: [facility]     ENDOSCOPIC RETROGRADE CHOLANGIOPANCREATOGRAM N/A 2018    Procedure: ENDOSCOPIC RETROGRADE CHOLANGIOPANCREATOGRAM, with Billary Sphincterotomy and Billary Stent Placement;  Surgeon: Omero Lawler MD;  [facility]     ENDOSCOPIC RETROGRADE CHOLANGIOPANCREATOGRAM  2019    Procedure: COMBINED ENDOSCOPIC RETROGRADE  "CHOLANGIOPANCREATOGRAPHY, Bile Duct Stent Exchange;  Surgeon: Omero Lawler MD;  [facility]     ENDOSCOPIC RETROGRADE CHOLANGIOPANCREATOGRAM N/A 2019    Procedure: ENDOSCOPIC RETROGRADE CHOLANGIOPANCREATOGRAPHY, WITH BILIARY STENT REMOVAL AND STONE EXTRACTION;  Surgeon: Omero Lawler MD;  [facility]     HERNIORRHAPHY INGUINAL  2018    Procedure: Herniorrhaphy inguinal;  Surgeon: Emil Echevarria MD;  [facility]     IR LIVER BIOPSY PERCUTANEOUS  2018     LAPAROTOMY EXPLORATORY      per the patient \"for infection in the LLQ\"      TRANSPLANT LIVER RECIPIENT  DONOR N/A 2018    Procedure: TRANSPLANT LIVER RECIPIENT  DONOR;  Surgeon: Emil Echevarria MD;  [facility]          Medications  Prior to Admission medications    Medication Sig Start Date End Date Taking? Authorizing Provider   amLODIPine (NORVASC) 5 MG tablet Take 1 tablet (5 mg) by mouth daily 3/2/22  Yes Jacqueline Juarez NP   atorvastatin (LIPITOR) 40 MG tablet Take 1 tablet (40 mg) by mouth daily 22  Yes Jaswinder Anne MD   blood glucose (NO BRAND SPECIFIED) lancets standard Use to test blood sugar 4 times daily or as directed. 21  Yes Jaswinder Anne MD   blood glucose (ONETOUCH VERIO IQ) test strip Use to test blood sugar 4 times daily or as directed. 22  Yes Jaswinder Anne MD   blood glucose monitoring (NO BRAND SPECIFIED) meter device kit Use to test blood sugar 4 times daily or as directed. Please let the pt know when it is ready to . 22  Yes Jaswinder Anne MD   dulaglutide (TRULICITY) 0.75 MG/0.5ML pen Inject 0.75 mg Subcutaneous every 7 days for 28 days, THEN 1.5 mg every 7 days. 2/15/22 5/10/22 Yes Leia Edward PA-C   glucose 40 % (400 mg/mL) gel Take 15-30 g by mouth every 15 minutes as needed for low blood sugar 20  Yes Jaswinder Anne MD   insulin glargine (LANTUS PEN) 100 UNIT/ML pen Take 15 units in " the morning and 15 units in the evening 1/26/22  Yes Leia Edward PA-C   insulin glargine (LANTUS SOLOSTAR) 100 UNIT/ML pen 12 Units Inject 14 units subcutaneous daily. 5/4/21  Yes Sue Tavares MD   insulin pen needle (BD KIRK U/F) 32G X 4 MM miscellaneous Use 1 daily. 1/26/22  Yes Leia Edward PA-C   Lancets (ONETOUCH DELICA PLUS [id number]) MISC U TO TEST QID OR UTD 3/3/20  Yes Reported, Patient   levothyroxine (SYNTHROID/LEVOTHROID) 125 MCG tablet Take 1 tablet (125 mcg) by mouth daily 3/2/22  Yes Jacqueline Juarez NP   levothyroxine (SYNTHROID/LEVOTHROID) 125 MCG tablet Take 1 tablet (125 mcg) by mouth daily 1/25/22  Yes Jaswinder Anne MD   losartan (COZAAR) 50 MG tablet Take 2 tablets (100 mg) by mouth daily 3/2/22  Yes Jacqueline Juarez NP   metFORMIN (GLUCOPHAGE-XR) 500 MG 24 hr tablet Take 2 tablets (1,000 mg) by mouth 2 times daily (with meals) 1/25/22  Yes Jaswinder Anne MD   Metoprolol Tartrate 75 MG TABS Take 1 tablet by mouth 2 times daily 10/4/21  Yes Jaswinder Anne MD   mycophenolate (GENERIC EQUIVALENT) 250 MG capsule Take 3 capsules (750 mg) by mouth 2 times daily 7/26/21  Yes Tamela Carballo MD   Sharps Container MISC 1 each daily 1/7/19  Yes Inez Baker APRN CNP   ticagrelor (BRILINTA) 90 MG tablet Take 1 tablet (90 mg) by mouth 2 times daily 1/25/22  Yes Jaswinder Anne MD   ursodiol (ACTIGALL) 250 MG tablet Take 1 tablet (250 mg) by mouth 2 times daily 2/22/21  Yes Tamela Carballo MD   Vitamin D3 (CHOLECALCIFEROL) 25 mcg (1000 units) tablet Take 2 tablets (50 mcg) by mouth daily 3/3/22 3/3/23 Yes Ann, Jacqueline Roy, NP       Allergies  No Known Allergies    Review of systems  A 10-point review of systems was negative    Examination  BP (!) 147/78   Pulse 61   Temp 98.1  F (36.7  C) (Oral)   Wt 91.2 kg (201 lb 1.6 oz)   SpO2 99%   BMI 31.50 kg/m    Body mass index is 31.5 kg/m .    Gen- well, NAD, A+Ox3,  normal color  Lym- no palpable LAD  CVS- RRR  RS- CTA  Abd- Obese and healed surgical scars.  Extr- hands normal, no OSKAR  Skin- no rash or jaundice  Neuro- no asterixis  Psych- normal mood    Laboratory  Lab Results   Component Value Date     03/23/2022     07/07/2021    POTASSIUM 4.2 03/23/2022    POTASSIUM 4.7 07/07/2021    CHLORIDE 108 03/23/2022    CHLORIDE 106 07/07/2021    CO2 27 03/23/2022    CO2 25 07/07/2021    BUN 17 03/23/2022    BUN 26 07/07/2021    CR 0.91 03/23/2022    CR 0.79 07/07/2021       Lab Results   Component Value Date    BILITOTAL 0.6 03/23/2022    BILITOTAL 0.9 07/07/2021    ALT 29 03/23/2022    ALT 17 07/07/2021    AST 19 03/23/2022    AST 11 07/07/2021    ALKPHOS 223 03/23/2022    ALKPHOS 143 07/07/2021       Lab Results   Component Value Date    ALBUMIN 3.1 03/23/2022    ALBUMIN 3.4 07/07/2021    PROTTOTAL 6.9 03/23/2022    PROTTOTAL 7.1 07/07/2021        Lab Results   Component Value Date    WBC 6.3 03/23/2022    WBC 7.0 07/07/2021    HGB 13.9 03/23/2022    HGB 14.0 07/07/2021    MCV 85 03/23/2022    MCV 83 07/07/2021     03/23/2022     07/07/2021       Lab Results   Component Value Date    INR 1.02 04/23/2021       Radiology    ASSESSMENT AND PLAN:  Status post liver transplantation.  Mr. 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  He is doing quite well except that he has gained a significant amount of weight.      During this visit besides his obesity, we also discussed with him about his history of prostate cancer followed by Urology.  He follows with them and so far no signs of recurrence. We also discussed with him about his history of HCC that has shown also no recurrence.  He has an appointment with Dr. Melgar which he is going to see tomorrow.  His last imaging, which was an MRI, was done on 03/07/2022 and it did not show any concerning lesions.  Mr. Limon also had seen  Cardiology as the patient has history of hypertension and hyperlipidemia and past history of smoking and he was having shortness of breath and chest tightness.  He also had diabetes mellitus type 2 and was having that also as a risk factor.  He had stress echocardiogram, which showed suboptimal heart rate and he also had some chest discomfort with exertion.  He had moderate coronary artery calcium on CT and we advised that he follows with Cardiology regarding that.     Other things we discussed during this visit were healthcare maintenance issues, which he is going to follow up with his primary care physician, but we will order also for him to see Dermatology and we ordered the consult.  He is complaining of some skin itching.  Mr. Limon also tells me that with hot water or when he touches hot cups or very cold water he has pain in his fingers and hands.  We thought that this could be a Raynaud's phenomena in his case as he has a past history of tobacco use.     I spent 35 minutes on the encounter 03/23/2022 in chart reviewing, history taking, physical examination, and documentation.  Some of the time was also used for coordination of care with his post-transplant coordinator and also for counseling.    Tamela Carballo MD  Hepatology  [facility]

## 2022-03-23 NOTE — LETTER
3/23/2022         RE: Loy Limon  [address]  [place] [address]        Dear Colleague,    Thank you for referring your patient, Loy Limon, to the [facility] [place]. Please see a copy of my visit note below.    [facility] Hepatology    Follow-up Visit    CHIEF COMPLAINT AND REASON FOR THE VISIT:  Status post liver transplantation.    CONSULTING HEALTHCARE PROVIDER:  Jaswinder Anne MD    SUBJECTIVE:  Loy Limon is a 66-year-old male who is originally from [place].  He communicates usually in the healthcare system through an , which in this case, we used.  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  He had the HCC diagnosed in 03/2018 and he received the liver transplantation on 12/22/2018.  He had, prior to him getting the liver transplantation, a microwave ablation of the liver lesion, but on the explant he had a viable tumor and we followed him here with us and also with Oncology.      So far, he did not show any signs of recurrence.  He has an appointment with Oncology tomorrow also.  Mr. Limon also on our followup here had prostate cancer, and this was treated by Urology.  Neither has that had any signs of recurrence also.      Today he tells me that he has gained a significant amount of weight from his lowest of 187 to 201 now, and this is within 6 months.  He also claims that he does not have any significant fluid retention like edema.  He has problems with dyspnea on exertion after 10 minutes of walking.  He has seen Cardiology and still follows with them.  He tells me that he has at times felt palpitations.      He denies any abdominal pain, nausea, vomiting.  He is moving his bowels at least once a day, and he has done colonoscopy before transplantation and is up to date with it.  As far as the COVID vaccination is concerned, he had done the  Pfizer and he finished the recommended dose.      Medical hx Surgical hx   Past Medical History:   Diagnosis Date     Anemia      Biliary stricture of transplanted liver (H) 2018     BPH (benign prostatic hyperplasia)      Cancer (H)     hepatocellualr carcinoma     Cirrhosis of liver (H) 2018     Diabetes (H)      Enlarged prostate      Hypothyroidism      Impotence of organic origin 2020     Inguinal hernia     Repaired with mesh on 18     Liver lesion 2018     Liver transplanted (H) 2018     donor liver transplant     Portal vein thrombosis     on path explant     Postoperative atrial fibrillation (H) 2018     Prostate cancer (H)      TB lung, latent     Treated       Past Surgical History:   Procedure Laterality Date     APPENDECTOMY       COLONOSCOPY           CV CORONARY ANGIOGRAM N/A 10/13/2021    Procedure: CV CORONARY ANGIOGRAM;  Surgeon: Yaya Hampton MD;  Location:  HEART CARDIAC CATH LAB     CV CORONARY LITHOTRIPSY PCI N/A 10/13/2021    Procedure: CV Coronary Lithotripsy PCI;  Surgeon: Yaya Hampton MD;  [facility]     CV INSTANTANEOUS WAVE-FREE RATIO N/A 10/13/2021    Procedure: Instantaneous Wave-Free Ratio;  Surgeon: Yaya Hampton MD;  [facility]ARD[facility]AC CATH [facility]     CV INTRAVASULAR ULTRASOUND N/A 10/13/2021    Procedure: Intravascular Ultrasound;  Surgeon: Yaya Hampton MD;  Location:  HEART CARDIAC CATH LAB     CV PCI STENT DRUG ELUTING N/A 10/13/2021    Procedure: Percutaneous Coronary Intervention Stent Drug Eluting;  Surgeon: Yaya Hampton MD;  Location:  HEART CARDIAC CATH LAB     DAVINCI PROSTATECTOMY N/A 1/3/2020    Procedure: Robot-assisted laparoscopic radical prostatectomy, Bladder biopsy;  Surgeon: Kenrick Casiano MD;  [facility]: [facility]     ENDOSCOPIC RETROGRADE CHOLANGIOPANCREATOGRAM N/A 2018     "Procedure: ENDOSCOPIC RETROGRADE CHOLANGIOPANCREATOGRAM, with Billary Sphincterotomy and Billary Stent Placement;  Surgeon: Omero Lawler MD;  [facility]     ENDOSCOPIC RETROGRADE CHOLANGIOPANCREATOGRAM  2019    Procedure: COMBINED ENDOSCOPIC RETROGRADE CHOLANGIOPANCREATOGRAPHY, Bile Duct Stent Exchange;  Surgeon: Omero Lawler MD;  [facility]     ENDOSCOPIC RETROGRADE CHOLANGIOPANCREATOGRAM N/A 2019    Procedure: ENDOSCOPIC RETROGRADE CHOLANGIOPANCREATOGRAPHY, WITH BILIARY STENT REMOVAL AND STONE EXTRACTION;  Surgeon: Omero Lawler MD;  [facility]     HERNIORRHAPHY INGUINAL  2018    Procedure: Herniorrhaphy inguinal;  Surgeon: Emil Echevarria MD;  [facility]     IR LIVER BIOPSY PERCUTANEOUS  2018     LAPAROTOMY EXPLORATORY      per the patient \"for infection in the LLQ\"      TRANSPLANT LIVER RECIPIENT  DONOR N/A 2018    Procedure: TRANSPLANT LIVER RECIPIENT  DONOR;  Surgeon: Emil Echevarria MD;  [facility]          Medications  Prior to Admission medications    Medication Sig Start Date End Date Taking? Authorizing Provider   amLODIPine (NORVASC) 5 MG tablet Take 1 tablet (5 mg) by mouth daily 3/2/22  Yes Jacqueline Juarez NP   atorvastatin (LIPITOR) 40 MG tablet Take 1 tablet (40 mg) by mouth daily 22  Yes Jaswinder Anne MD   blood glucose (NO BRAND SPECIFIED) lancets standard Use to test blood sugar 4 times daily or as directed. 21  Yes Jaswinder Anne MD   blood glucose (ONETOUCH VERIO IQ) test strip Use to test blood sugar 4 times daily or as directed. 22  Yes Jaswinder Anne MD   blood glucose monitoring (NO BRAND SPECIFIED) meter device kit Use to test blood sugar 4 times daily or as directed. Please let the pt know when it is ready to . 22  Yes Jaswinder Anne MD   dulaglutide (TRULICITY) 0.75 MG/0.5ML pen Inject 0.75 mg Subcutaneous every 7 days for 28 " days, THEN 1.5 mg every 7 days. 2/15/22 5/10/22 Yes Leia Edward PA-C   glucose 40 % (400 mg/mL) gel Take 15-30 g by mouth every 15 minutes as needed for low blood sugar 1/21/20  Yes Jaswinder Anne MD   insulin glargine (LANTUS PEN) 100 UNIT/ML pen Take 15 units in the morning and 15 units in the evening 1/26/22  Yes Leia Edward PA-C   insulin glargine (LANTUS SOLOSTAR) 100 UNIT/ML pen 12 Units Inject 14 units subcutaneous daily. 5/4/21  Yes Sue Tavares MD   insulin pen needle (BD KIRK U/F) 32G X 4 MM miscellaneous Use 1 daily. 1/26/22  Yes Leia Edward PA-C   Lancets (ONETOUCH DELICA PLUS [id number]) MISC U TO TEST QID OR UTD 3/3/20  Yes Reported, Patient   levothyroxine (SYNTHROID/LEVOTHROID) 125 MCG tablet Take 1 tablet (125 mcg) by mouth daily 3/2/22  Yes Jacqueline Juarez NP   levothyroxine (SYNTHROID/LEVOTHROID) 125 MCG tablet Take 1 tablet (125 mcg) by mouth daily 1/25/22  Yes Jaswinder Anne MD   losartan (COZAAR) 50 MG tablet Take 2 tablets (100 mg) by mouth daily 3/2/22  Yes Jacqueline Juarez NP   metFORMIN (GLUCOPHAGE-XR) 500 MG 24 hr tablet Take 2 tablets (1,000 mg) by mouth 2 times daily (with meals) 1/25/22  Yes Jaswinder Anne MD   Metoprolol Tartrate 75 MG TABS Take 1 tablet by mouth 2 times daily 10/4/21  Yes Jaswinder Anne MD   mycophenolate (GENERIC EQUIVALENT) 250 MG capsule Take 3 capsules (750 mg) by mouth 2 times daily 7/26/21  Yes Tamela Carballo MD   Sharps Container MISC 1 each daily 1/7/19  Yes Inez Baker APRN CNP   ticagrelor (BRILINTA) 90 MG tablet Take 1 tablet (90 mg) by mouth 2 times daily 1/25/22  Yes Jaswinder Anne MD   ursodiol (ACTIGALL) 250 MG tablet Take 1 tablet (250 mg) by mouth 2 times daily 2/22/21  Yes Tamela Carballo MD   Vitamin D3 (CHOLECALCIFEROL) 25 mcg (1000 units) tablet Take 2 tablets (50 mcg) by mouth daily 3/3/22 3/3/23 Yes Jacqueline Juarez NP       Allergies  No  Known Allergies    Review of systems  A 10-point review of systems was negative    Examination  BP (!) 147/78   Pulse 61   Temp 98.1  F (36.7  C) (Oral)   Wt 91.2 kg (201 lb 1.6 oz)   SpO2 99%   BMI 31.50 kg/m    Body mass index is 31.5 kg/m .    Gen- well, NAD, A+Ox3, normal color  Lym- no palpable LAD  CVS- RRR  RS- CTA  Abd- Obese and healed surgical scars.  Extr- hands normal, no OSKAR  Skin- no rash or jaundice  Neuro- no asterixis  Psych- normal mood    Laboratory  Lab Results   Component Value Date     03/23/2022     07/07/2021    POTASSIUM 4.2 03/23/2022    POTASSIUM 4.7 07/07/2021    CHLORIDE 108 03/23/2022    CHLORIDE 106 07/07/2021    CO2 27 03/23/2022    CO2 25 07/07/2021    BUN 17 03/23/2022    BUN 26 07/07/2021    CR 0.91 03/23/2022    CR 0.79 07/07/2021       Lab Results   Component Value Date    BILITOTAL 0.6 03/23/2022    BILITOTAL 0.9 07/07/2021    ALT 29 03/23/2022    ALT 17 07/07/2021    AST 19 03/23/2022    AST 11 07/07/2021    ALKPHOS 223 03/23/2022    ALKPHOS 143 07/07/2021       Lab Results   Component Value Date    ALBUMIN 3.1 03/23/2022    ALBUMIN 3.4 07/07/2021    PROTTOTAL 6.9 03/23/2022    PROTTOTAL 7.1 07/07/2021        Lab Results   Component Value Date    WBC 6.3 03/23/2022    WBC 7.0 07/07/2021    HGB 13.9 03/23/2022    HGB 14.0 07/07/2021    MCV 85 03/23/2022    MCV 83 07/07/2021     03/23/2022     07/07/2021       Lab Results   Component Value Date    INR 1.02 04/23/2021       Radiology    ASSESSMENT AND PLAN:  Status post liver transplantation.  Mr. 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  He is doing quite well except that he has gained a significant amount of weight.      During this visit besides his obesity, we also discussed with him about his history of prostate cancer followed by Urology.  He follows with them and so far no signs of recurrence. We also discussed  with him about his history of HCC that has shown also no recurrence.  He has an appointment with Dr. Melgar which he is going to see tomorrow.  His last imaging, which was an MRI, was done on 03/07/2022 and it did not show any concerning lesions.  Mr. Limon also had seen Cardiology as the patient has history of hypertension and hyperlipidemia and past history of smoking and he was having shortness of breath and chest tightness.  He also had diabetes mellitus type 2 and was having that also as a risk factor.  He had stress echocardiogram, which showed suboptimal heart rate and he also had some chest discomfort with exertion.  He had moderate coronary artery calcium on CT and we advised that he follows with Cardiology regarding that.     Other things we discussed during this visit were healthcare maintenance issues, which he is going to follow up with his primary care physician, but we will order also for him to see Dermatology and we ordered the consult.  He is complaining of some skin itching.  Mr. Limon also tells me that with hot water or when he touches hot cups or very cold water he has pain in his fingers and hands.  We thought that this could be a Raynaud's phenomena in his case as he has a past history of tobacco use.     I spent 35 minutes on the encounter 03/23/2022 in chart reviewing, history taking, physical examination, and documentation.  Some of the time was also used for coordination of care with his post-transplant coordinator and also for counseling.    Tamela Carballo MD  Hepatology  [facility]

## 2022-03-23 NOTE — TELEPHONE ENCOUNTER
Johan's tac level today is <1 because he is not taking tacrolimus.  He was switched to cellcept / pred last July, I see that prednisone is no longer on his med list.   Please call Johan, advise him that he should be taking prednisone 5 mg daily w/ cellcept 750 mg po bid.

## 2022-03-24 ENCOUNTER — ONCOLOGY VISIT (OUTPATIENT)
Dept: ONCOLOGY | Facility: CLINIC | Age: 67
End: 2022-03-24
Attending: INTERNAL MEDICINE
Payer: COMMERCIAL

## 2022-03-24 VITALS
DIASTOLIC BLOOD PRESSURE: 87 MMHG | TEMPERATURE: 97.2 F | OXYGEN SATURATION: 97 % | SYSTOLIC BLOOD PRESSURE: 166 MMHG | HEART RATE: 64 BPM | WEIGHT: 202.82 LBS | BODY MASS INDEX: 31.77 KG/M2

## 2022-03-24 DIAGNOSIS — C22.0 HCC (HEPATOCELLULAR CARCINOMA) (H): Primary | ICD-10-CM

## 2022-03-24 PROCEDURE — G0463 HOSPITAL OUTPT CLINIC VISIT: HCPCS

## 2022-03-24 PROCEDURE — 99214 OFFICE O/P EST MOD 30 MIN: CPT | Performed by: INTERNAL MEDICINE

## 2022-03-24 RX ORDER — PREDNISONE 5 MG/1
5 TABLET ORAL DAILY
Qty: 90 TABLET | Refills: 3 | Status: SHIPPED | OUTPATIENT
Start: 2022-03-24 | End: 2023-01-27

## 2022-03-24 ASSESSMENT — PAIN SCALES - GENERAL: PAINLEVEL: NO PAIN (0)

## 2022-03-24 NOTE — NURSING NOTE
"Oncology Rooming Note    March 24, 2022 1:10 PM   Loy Limon is a 66 year old male who presents for:    Chief Complaint   Patient presents with     Oncology Clinic Visit     PROSTATE CANCER     Initial Vitals: BP (!) 166/87   Pulse 64   Temp 97.2  F (36.2  C)   Wt 92 kg (202 lb 13.2 oz)   SpO2 97%   BMI 31.77 kg/m   Estimated body mass index is 31.77 kg/m  as calculated from the following:    Height as of 3/2/22: 1.702 m (5' 7\").    Weight as of this encounter: 92 kg (202 lb 13.2 oz). Body surface area is 2.09 meters squared.  No Pain (0) Comment: Data Unavailable   No LMP for male patient.  Allergies reviewed: Yes  Medications reviewed: Yes    Medications: Medication refills not needed today.  Pharmacy name entered into EPIC:    [facility] [place] - [place], [place] - [address]  [facility] - [place], [place] - [address] AT [address]    Clinical concerns: None       Noelle Clark            "

## 2022-03-24 NOTE — TELEPHONE ENCOUNTER
Spoke to pt with interpretor Glenys. Pt has not taken prednisone and not sure for how long. Pt states I ran out and never called for a refill. Instructed pt of the need to begin prednisone 5 mg daily and have the script refilled when close to running out. Advised pt to have tx labs done a week after starting the prednisone. Pt agreed.

## 2022-03-24 NOTE — LETTER
3/24/2022         RE: Loy Limon  [address]  [facility] [id number]        Dear Colleague,    Thank you for referring your patient, Loy Limon, to the [facility]. Please see a copy of my visit note below.    Oncology follow up visit:  Date on this visit: 3/24/22     HCC    Primary Physician: [facility], Oncology     History Of Present Illness:      Please see previous note for details. I have copied and updated from prior note.      Mr. Limon is a 66 year old male who has been a chronic heavy alcohol drinker presented with right abdominal pain in March 2018 and workup including a CT scan showed cirrhosis, portal hypertension and 2 hepatic lesions in the right hepatic lobe which were concerning for hepatocellular carcinoma.  He had MRI on 3/16/2018 which confirmed cirrhosis and portal hypertension and splenomegaly. 3.7 cm segment 8 lesion was OPTN 5B.  There was an antecedent 0.9 cm satellite nodule in hepatic segment 8 consistent with LI-RADS 4.  In segment 7, 1.5 cm LIRADS 4 lesion was seen  Another 1.9 cm segment 7 lesion was seen which was indeterminate.  Several other LIRADS 3 lesions were scattered.  Alpha-fetoprotein was not elevated.  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 Hepatitis B surface antibody was reactive consistent with effective vaccination. Hepatitis C antibody was nonreactive.  He has had no ascites. He has had mild hepatic and Neuropathy but was unable to tolerate lactulose because of abdominal discomfort. He has no history of variceal bleeding although he has grade 2 esophageal varices which have not been banded and he continues to be on propranolol.    He had repeat MRI done on 5/24/2018 which showed stable to slight increase in size of the segment 8 lesion.  Segment 7 lesion was consistent with LIRADS 4 lesion  Another segment 7 lesion is also consistent with  LIRADS 4 lesion  CT of the chest showed a 4 mm solitary pulmonary nodule in the right lower lobe which remains indeterminate. There was no other evidence of metastasis.  Bone scan was negative for any evidence of metastatic disease.    He had chemoembolization of the S8 lesion on 6/13/18.    As there was residual tumor left, he had microwave ablation of residual mass in hepatic segment 8 on 7/2/2018.     S7 IRADS 4 lesions were not treated    Repeat MRI on 10/3/18 shows no residual viable tumor in S8 lesion  S7 LIRADS 4 lesion was less well defined.  He has some scattered LIRADS 3 lesions.    He had liver transplant in Dec 2018 and explanted liver showed S8 viable tumor with 90% necrosis.  S7 1.1 cm HCC and S5 1.4 cm HCC.  3 benign lymph nodes.  It was a ypT2N0 lesion.    He developed prostate cancer (4/26/19 - PSA 5.51ng/dL) and is now s/p RALP with Dr. Casiano on 1/3/20, final path: Dulzura 3+4=7, neg margins, [id number].   Last PSA on 7/3/2020- <0.01.      Interval hx:   A professional Croatian Interpretor is available throughout the visit through iPad    He is overall doing well. No pain. Has noticed mild ankle swelling. He has mild dyspnea on exertion. No orthopnea or PND. Has some abdominal bloating since transplant surgery. No nausea or vomiting. BMs are fine  Urine incontinence is much better now      ECOG 1    ROS:  Rest of the comprehensive review of the system was essentially unremarkable.        I reviewed other history in epic as below.    Past Medical/Surgical History:  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/28/2018     BPH (benign prostatic hyperplasia)      Cancer (H)     hepatocellualr carcinoma     Cirrhosis of liver (H) 05/08/2018     Diabetes (H)      Enlarged prostate      Hypothyroidism      Impotence of organic origin 09/25/2020     Inguinal hernia     Repaired with mesh on 12/22/18     Liver lesion 05/08/2018     Liver transplanted  (H) 2018     donor liver transplant     Portal vein thrombosis     on path explant     Postoperative atrial fibrillation (H) 2018     Prostate cancer (H)      TB lung, latent     Treated      Past Surgical History:   Procedure Laterality Date     APPENDECTOMY       COLONOSCOPY           CV CORONARY ANGIOGRAM N/A 10/13/2021    Procedure: CV CORONARY ANGIOGRAM;  Surgeon: Yaya Hampton MD;  Location:  [facility]     CV CORONARY LITHOTRIPSY PCI N/A 10/13/2021    Procedure: CV Coronary Lithotripsy PCI;  Surgeon: Yaya Hampton MD;  Location:  [facility]ATH [facility]     CV INSTANTANEOUS WAVE-FREE RATIO N/A 10/13/2021    Procedure: Instantaneous Wave-Free Ratio;  Surgeon: Yaya Hampton MD;  Location:  HE[facility]ATH [facility]     CV INTRAVASULAR ULTRASOUND N/A 10/13/2021    Procedure: Intravascular Ultrasound;  Surgeon: Yaya Hampton MD;  Location:  [facility]     CV PCI STENT DRUG ELUTING N/A 10/13/2021    Procedure: Percutaneous Coronary Intervention Stent Drug Eluting;  Surgeon: Yaya Hampton MD;  Location:  [facility]     DAVINCI PROSTATECTOMY N/A 1/3/2020    Procedure: Robot-assisted laparoscopic radical prostatectomy, Bladder biopsy;  Surgeon: Kenrick Casiano MD;  Location: [facility]     ENDOSCOPIC RETROGRADE CHOLANGIOPANCREATOGRAM N/A 2018    Procedure: ENDOSCOPIC RETROGRADE CHOLANGIOPANCREATOGRAM, with Billary Sphincterotomy and Billary Stent Placement;  Surgeon: Omero Lawler MD;  Location: [facility]     ENDOSCOPIC RETROGRADE CHOLANGIOPANCREATOGRAM  2019    Procedure: COMBINED ENDOSCOPIC RETROGRADE CHOLANGIOPANCREATOGRAPHY, Bile Duct Stent Exchange;  Surgeon: Omero Lawler MD;  Location: [facility]     ENDOSCOPIC RETROGRADE CHOLANGIOPANCREATOGRAM N/A 2019    Procedure: ENDOSCOPIC RETROGRADE CHOLANGIOPANCREATOGRAPHY, WITH BILIARY  "STENT REMOVAL AND STONE EXTRACTION;  Surgeon: Omero Lawler MD;  Location: [facility]     HERNIORRHAPHY INGUINAL  2018    Procedure: Herniorrhaphy inguinal;  Surgeon: Emil Echevarria MD;  Location: [facility]     IR LIVER BIOPSY PERCUTANEOUS  2018     LAPAROTOMY EXPLORATORY      per the patient \"for infection in the LLQ\"      TRANSPLANT LIVER RECIPIENT  DONOR N/A 2018    Procedure: TRANSPLANT LIVER RECIPIENT  DONOR;  Surgeon: Emil Echevarria MD;  Location: [facility] OR     Cancer History:   As above    Allergies:  Allergies as of 2022     (No Known Allergies)     Current Medications:  Current Outpatient Medications   Medication Sig Dispense Refill     amLODIPine (NORVASC) 5 MG tablet Take 1 tablet (5 mg) by mouth daily 90 tablet 3     atorvastatin (LIPITOR) 40 MG tablet Take 1 tablet (40 mg) by mouth daily 90 tablet 3     blood glucose (NO BRAND SPECIFIED) lancets standard Use to test blood sugar 4 times daily or as directed. 400 each 2     blood glucose (ONETOUCH VERIO IQ) test strip Use to test blood sugar 4 times daily or as directed. 400 strip 3     blood glucose monitoring (NO BRAND SPECIFIED) meter device kit Use to test blood sugar 4 times daily or as directed. Please let the pt know when it is ready to . 1 kit 0     dulaglutide (TRULICITY) 0.75 MG/0.5ML pen Inject 0.75 mg Subcutaneous every 7 days for 28 days, THEN 1.5 mg every 7 days. 2 mL 0     glucose 40 % (400 mg/mL) gel Take 15-30 g by mouth every 15 minutes as needed for low blood sugar 30 g 3     insulin glargine (LANTUS PEN) 100 UNIT/ML pen Take 15 units in the morning and 15 units in the evening 15 mL 1     insulin glargine (LANTUS SOLOSTAR) 100 UNIT/ML pen 12 Units Inject 14 units subcutaneous daily. 15 mL 1     insulin pen needle (BD KIRK U/F) 32G X 4 MM miscellaneous Use 1 daily. 100 each 1     Lancets (ONETOUCH DELICA PLUS [id number]) MISC U TO TEST QID OR UTD       levothyroxine " (SYNTHROID/LEVOTHROID) 125 MCG tablet Take 1 tablet (125 mcg) by mouth daily 90 tablet 3     levothyroxine (SYNTHROID/LEVOTHROID) 125 MCG tablet Take 1 tablet (125 mcg) by mouth daily 90 tablet 3     losartan (COZAAR) 50 MG tablet Take 2 tablets (100 mg) by mouth daily 180 tablet 0     metFORMIN (GLUCOPHAGE-XR) 500 MG 24 hr tablet Take 2 tablets (1,000 mg) by mouth 2 times daily (with meals) 360 tablet 3     Metoprolol Tartrate 75 MG TABS Take 1 tablet by mouth 2 times daily 240 tablet 0     mycophenolate (GENERIC EQUIVALENT) 250 MG capsule Take 3 capsules (750 mg) by mouth 2 times daily 540 capsule 3     predniSONE (DELTASONE) 5 MG tablet Take 1 tablet (5 mg) by mouth daily 90 tablet 3     Sharps Container MISC 1 each daily 1 each 2     ticagrelor (BRILINTA) 90 MG tablet Take 1 tablet (90 mg) by mouth 2 times daily 180 tablet 3     ursodiol (ACTIGALL) 250 MG tablet Take 1 tablet (250 mg) by mouth 2 times daily 180 tablet 3     Vitamin D3 (CHOLECALCIFEROL) 25 mcg (1000 units) tablet Take 2 tablets (50 mcg) by mouth daily 180 tablet 3      Family History:  Family History   Problem Relation Age of Onset     Memory loss Mother      Liver Disease Brother      Skin Cancer No family hx of      Melanoma No family hx of       Maternal grand mother had Uterus cancer at 63  He has 3 living children. One son with schizophrenia  One daughter dies of leukemia at 8 years of age  Son  shortly after birth. He had some brain congenital anomaly    Social History:  Social History     Socioeconomic History     Marital status:      Spouse name: Not on file     Number of children: Not on file     Years of education: Not on file     Highest education level: Not on file   Occupational History     Not on file   Tobacco Use     Smoking status: Former Smoker     Packs/day: 0.03     Years: 20.00     Pack years: 0.60     Types: Cigarettes, Cigars     Start date: 1970     Quit date: 3/14/2018     Years since quittin.0      Smokeless tobacco: Never Used     Tobacco comment: Quit smoking Feb 2018, one cigarette after dinner   Substance and Sexual Activity     Alcohol use: No     Comment: Quit drinking Feb 2018     Drug use: No     Sexual activity: Not on file   Other Topics Concern     Parent/sibling w/ CABG, MI or angioplasty before 65F 55M? Not Asked   Social History Narrative    Born in [place], came to [place] years ago.     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cardboard, trimming meats     Social Determinants of Health     Financial Resource Strain: Not on file   Food Insecurity: Not on file   Transportation Needs: Not on file   Physical Activity: Not on file   Stress: Not on file   Social Connections: Not on file   Intimate Partner Violence: Not At Risk     Fear of Current or Ex-Partner: No     Emotionally Abused: No     Physically Abused: No     Sexually Abused: No   Housing Stability: Not on file   smoker for 25 years. One pack for 1 week. Quit in Feb 2018.  Has been a heavy drinker for 30 years, beer about 36 every week. Last drink was around Feb 2018.  No drug use.  Lives with wife.     Physical Exam:  There were no vitals taken for this visit.   Wt Readings from Last 4 Encounters:   03/23/22 91.2 kg (201 lb 1.6 oz)   03/02/22 91.3 kg (201 lb 3.2 oz)   02/15/22 91.4 kg (201 lb 6.4 oz)   01/25/22 89.4 kg (197 lb)     CONSTITUTIONAL: no acute distress  EYES: PERRLA, no palor or icterus.   ENT/MOUTH: no mouth lesions. Ears normal  CVS: s1s2 no m r g .   RESPIRATORY: clear to auscultation b/l  GI: soft non tender.  Well-healed surgical scars.  No abnormal masses palpable.  NEURO: AAOX3  Grossly non focal neuro exam  INTEGUMENT: no obvious rashes  LYMPHATIC: no palpable cervical, supraclavicular, axillary or inguinal LAD  MUSCULOSKELETAL: Unremarkable. No bony tenderness.   EXTREMITIES: trace bilateral edema  PSYCH: Mentation, mood and affect are normal. Decision making capacity is intact    Laboratory/Imaging  Studies    Reviewed  3/23/2022.  CBC is normal.  CMP shows albumin 3.1.  Calcium 8.2.  Alkaline phosphatase 223.    3/7/2022-  alpha-fetoprotein <1.5    MRI Abdomen on 3/7/2022 showed stable postsurgical changes of liver transplant without evidence of recurrent malignancy.      ASSESSMENT/PLAN:    HCC  S8 3.7 cm OPTN 5B lesion  S7 LIRADS 4 lesions x 2    No evidence of metastatic disease    He had chemoembolization of the S8 lesion on 6/13/18.    As there was residual tumor left, he had microwave ablation of residual mass in hepatic segment 8 on 7/2/2018.     S7 IRADS 4 lesions were not treated    Repeat MRI on 10/3/18 shows no residual viable tumor in S8 lesion  S7 LIRADS 4 lesion was less well defined.  He has some scattered LIRADS 3 lesions.      He had liver transplant in Dec 2018 and explanted liver showed S8 viable tumor with 90% necrosis.  S7 1.1 cm HCC and S5 1.4 cm HCC.  3 benign lymph nodes.  It was a ypT2N0 lesion.    He has done well.  Currently there is no evidence of recurrent cancer.      Plan to repeat labs and MRI in 6 months.      Abdominal bloating sensation. Constant since transplant surgery and likely related to damage to abdominal muscles/nerves from surgery. It does not hurt him.  I reviewed note from Hepatologist, Dr Carballo whom he saw yesterday.    SOB. Has ENGLISH and has mild ankle swelling. No orthopnea or PND.  Cardiac catheterization October 2021 showed 60% mid LAD stenosis and he had drug-eluting stent placed.  He has been a chronic smoker and has HTN HL and DM- I advised him to follow with cardiology    Prostate Cancer. He developed prostate cancer (4/26/19 - PSA 5.51ng/dL) and is now s/p RALP with Dr. Casiano on 1/3/20, final path: Rebecca 3+4=7, neg margins, [id number].   Last PSA was <0.01 on 9/9/2021.  Continue to follow with urology.    We did not address the following today.    Pulmonary nodule-indeterminate 4 mm nodule in  right lower lobe has now resolved. Will not repeat routine  imaging for chest for now     RTC in 6 months    All questions answered and he agrees with the plan        Again, thank you for allowing me to participate in the care of your patient.      Sincerely,    Hi Melgar MD

## 2022-03-24 NOTE — PROGRESS NOTES
Oncology follow up visit:  Date on this visit: 3/24/22     HCC    Primary Physician: [facility], Oncology     History Of Present Illness:      Please see previous note for details. I have copied and updated from prior note.      Mr. Limon is a 66 year old male who has been a chronic heavy alcohol drinker presented with right abdominal pain in March 2018 and workup including a CT scan showed cirrhosis, portal hypertension and 2 hepatic lesions in the right hepatic lobe which were concerning for hepatocellular carcinoma.  He had MRI on 3/16/2018 which confirmed cirrhosis and portal hypertension and splenomegaly. 3.7 cm segment 8 lesion was OPTN 5B.  There was an antecedent 0.9 cm satellite nodule in hepatic segment 8 consistent with LI-RADS 4.  In segment 7, 1.5 cm LIRADS 4 lesion was seen  Another 1.9 cm segment 7 lesion was seen which was indeterminate.  Several other LIRADS 3 lesions were scattered.  Alpha-fetoprotein was not elevated.  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 Hepatitis B surface antibody was reactive consistent with effective vaccination. Hepatitis C antibody was nonreactive.  He has had no ascites. He has had mild hepatic and Neuropathy but was unable to tolerate lactulose because of abdominal discomfort. He has no history of variceal bleeding although he has grade 2 esophageal varices which have not been banded and he continues to be on propranolol.    He had repeat MRI done on 5/24/2018 which showed stable to slight increase in size of the segment 8 lesion.  Segment 7 lesion was consistent with LIRADS 4 lesion  Another segment 7 lesion is also consistent with LIRADS 4 lesion  CT of the chest showed a 4 mm solitary pulmonary nodule in the right lower lobe which remains indeterminate. There was no other evidence of metastasis.  Bone scan was negative for any evidence of metastatic disease.    He had chemoembolization of the S8  lesion on 18.    As there was residual tumor left, he had microwave ablation of residual mass in hepatic segment 8 on 2018.     S7 IRADS 4 lesions were not treated    Repeat MRI on 10/3/18 shows no residual viable tumor in S8 lesion  S7 LIRADS 4 lesion was less well defined.  He has some scattered LIRADS 3 lesions.    He had liver transplant in Dec 2018 and explanted liver showed S8 viable tumor with 90% necrosis.  S7 1.1 cm HCC and S5 1.4 cm HCC.  3 benign lymph nodes.  It was a ypT2N0 lesion.    He developed prostate cancer (19 - PSA 5.51ng/dL) and is now s/p RALP with Dr. Casiano on 1/3/20, final path: [place] 3+4=7, neg margins, [id number].   Last PSA on 7/3/2020- <0.01.      Interval hx:   A professional [place] Interpretor is available throughout the visit through iPad    He is overall doing well. No pain. Has noticed mild ankle swelling. He has mild dyspnea on exertion. No orthopnea or PND. Has some abdominal bloating since transplant surgery. No nausea or vomiting. BMs are fine  Urine incontinence is much better now      ECOG 1    ROS:  Rest of the comprehensive review of the system was essentially unremarkable.        I reviewed other history in epic as below.    Past Medical/Surgical History:  Past Medical History:   Diagnosis Date     Anemia      Biliary stricture of transplanted liver (H) 2018     BPH (benign prostatic hyperplasia)      Cancer (H)     hepatocellualr carcinoma     Cirrhosis of liver (H) 2018     Diabetes (H)      Enlarged prostate      Hypothyroidism      Impotence of organic origin 2020     Inguinal hernia     Repaired with mesh on 18     Liver lesion 2018     Liver transplanted (H) 2018     donor liver transplant     Portal vein thrombosis     on path explant     Postoperative atrial fibrillation (H) 2018     Prostate cancer (H)      TB lung, latent     Treated      Past Surgical History:   Procedure Laterality Date      APPENDECTOMY       COLONOSCOPY      2018     CV CORONARY ANGIOGRAM N/A 10/13/2021    Procedure: CV CORONARY ANGIOGRAM;  Surgeon: Yaya Hampton MD;  [facility]     CV CORONARY LITHOTRIPSY PCI N/A 10/13/2021    Procedure: CV Coronary Lithotripsy PCI;  Surgeon: Yaya Hampton MD;  [facility]     CV INSTANTANEOUS WAVE-FREE RATIO N/A 10/13/2021    Procedure: Instantaneous Wave-Free Ratio;  Surgeon: Yaya Hampton MD;  [facility]     CV INTRAVASULAR ULTRASOUND N/A 10/13/2021    Procedure: Intravascular Ultrasound;  Surgeon: Yaya Hampton MD;  [facility]     CV PCI STENT DRUG ELUTING N/A 10/13/2021    Procedure: Percutaneous Coronary Intervention Stent Drug Eluting;  Surgeon: Yaya Hampton MD;  [facility]     DAVINCI PROSTATECTOMY N/A 1/3/2020    Procedure: Robot-assisted laparoscopic radical prostatectomy, Bladder biopsy;  Surgeon: Kenrick Casiano MD;  [facility]     ENDOSCOPIC RETROGRADE CHOLANGIOPANCREATOGRAM N/A 12/28/2018    Procedure: ENDOSCOPIC RETROGRADE CHOLANGIOPANCREATOGRAM, with Billary Sphincterotomy and Billary Stent Placement;  Surgeon: Omero Lawler MD;  [facility]     ENDOSCOPIC RETROGRADE CHOLANGIOPANCREATOGRAM  2/18/2019    Procedure: COMBINED ENDOSCOPIC RETROGRADE CHOLANGIOPANCREATOGRAPHY, Bile Duct Stent Exchange;  Surgeon: Omero Lawler MD;  [facility]     ENDOSCOPIC RETROGRADE CHOLANGIOPANCREATOGRAM N/A 4/29/2019    Procedure: ENDOSCOPIC RETROGRADE CHOLANGIOPANCREATOGRAPHY, WITH BILIARY STENT REMOVAL AND STONE EXTRACTION;  Surgeon: Omero Lawler MD;  [facility]     HERNIORRHAPHY INGUINAL  12/22/2018    Procedure: Herniorrhaphy inguinal;  Surgeon: Emil Echevarria MD;  [facility]     IR LIVER BIOPSY PERCUTANEOUS  12/31/2018      "LAPAROTOMY EXPLORATORY      per the patient \"for infection in the LLQ\"      TRANSPLANT LIVER RECIPIENT  DONOR N/A 2018    Procedure: TRANSPLANT LIVER RECIPIENT  DONOR;  Surgeon: Emil Echevarria MD;  [facility]     Cancer History:   As above    Allergies:  Allergies as of 2022     (No Known Allergies)     Current Medications:  Current Outpatient Medications   Medication Sig Dispense Refill     amLODIPine (NORVASC) 5 MG tablet Take 1 tablet (5 mg) by mouth daily 90 tablet 3     atorvastatin (LIPITOR) 40 MG tablet Take 1 tablet (40 mg) by mouth daily 90 tablet 3     blood glucose (NO BRAND SPECIFIED) lancets standard Use to test blood sugar 4 times daily or as directed. 400 each 2     blood glucose (ONETOUCH VERIO IQ) test strip Use to test blood sugar 4 times daily or as directed. 400 strip 3     blood glucose monitoring (NO BRAND SPECIFIED) meter device kit Use to test blood sugar 4 times daily or as directed. Please let the pt know when it is ready to . 1 kit 0     dulaglutide (TRULICITY) 0.75 MG/0.5ML pen Inject 0.75 mg Subcutaneous every 7 days for 28 days, THEN 1.5 mg every 7 days. 2 mL 0     glucose 40 % (400 mg/mL) gel Take 15-30 g by mouth every 15 minutes as needed for low blood sugar 30 g 3     insulin glargine (LANTUS PEN) 100 UNIT/ML pen Take 15 units in the morning and 15 units in the evening 15 mL 1     insulin glargine (LANTUS SOLOSTAR) 100 UNIT/ML pen 12 Units Inject 14 units subcutaneous daily. 15 mL 1     insulin pen needle (BD KIRK U/F) 32G X 4 MM miscellaneous Use 1 daily. 100 each 1     Lancets (ONETOUCH DELICA PLUS [id number]) MISC U TO TEST QID OR UTD       levothyroxine (SYNTHROID/LEVOTHROID) 125 MCG tablet Take 1 tablet (125 mcg) by mouth daily 90 tablet 3     levothyroxine (SYNTHROID/LEVOTHROID) 125 MCG tablet Take 1 tablet (125 mcg) by mouth daily 90 tablet 3     losartan (COZAAR) 50 MG tablet Take 2 tablets (100 mg) by mouth daily 180 tablet 0 "     metFORMIN (GLUCOPHAGE-XR) 500 MG 24 hr tablet Take 2 tablets (1,000 mg) by mouth 2 times daily (with meals) 360 tablet 3     Metoprolol Tartrate 75 MG TABS Take 1 tablet by mouth 2 times daily 240 tablet 0     mycophenolate (GENERIC EQUIVALENT) 250 MG capsule Take 3 capsules (750 mg) by mouth 2 times daily 540 capsule 3     predniSONE (DELTASONE) 5 MG tablet Take 1 tablet (5 mg) by mouth daily 90 tablet 3     Sharps Container MISC 1 each daily 1 each 2     ticagrelor (BRILINTA) 90 MG tablet Take 1 tablet (90 mg) by mouth 2 times daily 180 tablet 3     ursodiol (ACTIGALL) 250 MG tablet Take 1 tablet (250 mg) by mouth 2 times daily 180 tablet 3     Vitamin D3 (CHOLECALCIFEROL) 25 mcg (1000 units) tablet Take 2 tablets (50 mcg) by mouth daily 180 tablet 3      Family History:  Family History   Problem Relation Age of Onset     Memory loss Mother      Liver Disease Brother      Skin Cancer No family hx of      Melanoma No family hx of       Maternal grand mother had Uterus cancer at 63  He has 3 living children. One son with schizophrenia  One daughter dies of leukemia at 8 years of age  Son  shortly after birth. He had some brain congenital anomaly    Social History:  Social History     Socioeconomic History     Marital status:      Spouse name: Not on file     Number of children: Not on file     Years of education: Not on file     Highest education level: Not on file   Occupational History     Not on file   Tobacco Use     Smoking status: Former Smoker     Packs/day: 0.03     Years: 20.00     Pack years: 0.60     Types: Cigarettes, Cigars     Start date: 1970     Quit date: 3/14/2018     Years since quittin.0     Smokeless tobacco: Never Used     Tobacco comment: Quit smoking 2018, one cigarette after dinner   Substance and Sexual Activity     Alcohol use: No     Comment: Quit drinking 2018     Drug use: No     Sexual activity: Not on file   Other Topics Concern     Parent/sibling w/  CABG, MI or angioplasty before 65F 55M? Not Asked   Social History Narrative    Born in [place], came to [place] years ago.     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cardboard, trimming meats     Social Determinants of Health     Financial Resource Strain: Not on file   Food Insecurity: Not on file   Transportation Needs: Not on file   Physical Activity: Not on file   Stress: Not on file   Social Connections: Not on file   Intimate Partner Violence: Not At Risk     Fear of Current or Ex-Partner: No     Emotionally Abused: No     Physically Abused: No     Sexually Abused: No   Housing Stability: Not on file   smoker for 25 years. One pack for 1 week. Quit in Feb 2018.  Has been a heavy drinker for 30 years, beer about 36 every week. Last drink was around Feb 2018.  No drug use.  Lives with wife.     Physical Exam:  There were no vitals taken for this visit.   Wt Readings from Last 4 Encounters:   03/23/22 91.2 kg (201 lb 1.6 oz)   03/02/22 91.3 kg (201 lb 3.2 oz)   02/15/22 91.4 kg (201 lb 6.4 oz)   01/25/22 89.4 kg (197 lb)       CONSTITUTIONAL: no acute distress  EYES: PERRLA, no palor or icterus.   ENT/MOUTH: no mouth lesions. Ears normal  CVS: s1s2 no m r g .   RESPIRATORY: clear to auscultation b/l  GI: soft non tender.  Well-healed surgical scars.  No abnormal masses palpable.  NEURO: AAOX3  Grossly non focal neuro exam  INTEGUMENT: no obvious rashes  LYMPHATIC: no palpable cervical, supraclavicular, axillary or inguinal LAD  MUSCULOSKELETAL: Unremarkable. No bony tenderness.   EXTREMITIES: trace bilateral edema  PSYCH: Mentation, mood and affect are normal. Decision making capacity is intact      Laboratory/Imaging Studies    Reviewed  3/23/2022.  CBC is normal.  CMP shows albumin 3.1.  Calcium 8.2.  Alkaline phosphatase 223.    3/7/2022-  alpha-fetoprotein <1.5    MRI Abdomen on 3/7/2022 showed stable postsurgical changes of liver transplant without evidence of recurrent malignancy.          ASSESSMENT/PLAN:    HCC  S8  3.7 cm OPTN 5B lesion  S7 LIRADS 4 lesions x 2    No evidence of metastatic disease    He had chemoembolization of the S8 lesion on 6/13/18.    As there was residual tumor left, he had microwave ablation of residual mass in hepatic segment 8 on 7/2/2018.     S7 IRADS 4 lesions were not treated    Repeat MRI on 10/3/18 shows no residual viable tumor in S8 lesion  S7 LIRADS 4 lesion was less well defined.  He has some scattered LIRADS 3 lesions.      He had liver transplant in Dec 2018 and explanted liver showed S8 viable tumor with 90% necrosis.  S7 1.1 cm HCC and S5 1.4 cm HCC.  3 benign lymph nodes.  It was a ypT2N0 lesion.    He has done well.  Currently there is no evidence of recurrent cancer.      Plan to repeat labs and MRI in 6 months.      Abdominal bloating sensation. Constant since transplant surgery and likely related to damage to abdominal muscles/nerves from surgery. It does not hurt him.  I reviewed note from Hepatologist, Dr Carballo whom he saw yesterday.    SOB. Has ENGLISH and has mild ankle swelling. No orthopnea or PND.  Cardiac catheterization October 2021 showed 60% mid LAD stenosis and he had drug-eluting stent placed.  He has been a chronic smoker and has HTN HL and DM- I advised him to follow with cardiology      Prostate Cancer. He developed prostate cancer (4/26/19 - PSA 5.51ng/dL) and is now s/p RALP with Dr. Casiano on 1/3/20, final path: [place] 3+4=7, neg margins, [id number].   Last PSA was <0.01 on 9/9/2021.  Continue to follow with urology.    We did not address the following today.      Pulmonary nodule-indeterminate 4 mm nodule in  right lower lobe has now resolved. Will not repeat routine imaging for chest for now       RTC in 6 months    All questions answered and he agrees with the plan    Hi Melgar

## 2022-03-30 ENCOUNTER — APPOINTMENT (OUTPATIENT)
Dept: INTERPRETER SERVICES | Facility: CLINIC | Age: 67
End: 2022-03-30
Payer: COMMERCIAL

## 2022-03-31 NOTE — ANESTHESIA CARE TRANSFER NOTE
Patient: Loy Limon    Procedure(s):  TRANSPLANT LIVER RECIPIENT  DONOR  Herniorrhaphy inguinal    Diagnosis: cirrhosis of liver  Diagnosis Additional Information: No value filed.    Anesthesia Type:   No value filed.     Note:  Airway :ETT  Patient transferred to:ICU  Comments: Transferred to ICU intubated, Ambu with oxygen during transport, hemodynamically stable on nor epinephribne infusion gtt. Upon arrival to ICU, sedated with propofol, pain appears nil.  Neuromuscular blockade is not reversed.     Karen Tesfaye Handoff: Call for PAUSE to initiate/utilize ICU HANDOFF, Identified Patient, Identified Responsible Provider, Reviewed the Pertinent Medical History, Discussed Surgical Course, Reviewed Intra-OP Anesthesia Management and Issues during Anesthesia, Set Expectations for Post Procedure Period and Allowed Opportunity for Questions and Acknowledgement of Understanding      Vitals: (Last set prior to Anesthesia Care Transfer)    [facility] VITALS  2018 - 2018             Pulse:  68    ART BP:  138/50    ART Mean:  75    Ht Rate:  67    Temp:  37.8  C (100  F)    SpO2:  99 %    Resp Rate (observed):  15    Resp Rate (set):  15                Electronically Signed By: Karen Costa MD  2018  8:59 PM  
None

## 2022-04-01 ENCOUNTER — TELEPHONE (OUTPATIENT)
Dept: ENDOCRINOLOGY | Facility: CLINIC | Age: 67
End: 2022-04-01

## 2022-04-01 ENCOUNTER — OFFICE VISIT (OUTPATIENT)
Dept: CARDIOLOGY | Facility: CLINIC | Age: 67
End: 2022-04-01
Attending: FAMILY MEDICINE
Payer: COMMERCIAL

## 2022-04-01 VITALS
OXYGEN SATURATION: 97 % | DIASTOLIC BLOOD PRESSURE: 47 MMHG | WEIGHT: 198 LBS | BODY MASS INDEX: 31.01 KG/M2 | HEART RATE: 55 BPM | SYSTOLIC BLOOD PRESSURE: 131 MMHG

## 2022-04-01 DIAGNOSIS — I25.118 CORONARY ARTERY DISEASE OF NATIVE ARTERY OF NATIVE HEART WITH STABLE ANGINA PECTORIS (H): Primary | ICD-10-CM

## 2022-04-01 PROCEDURE — 99215 OFFICE O/P EST HI 40 MIN: CPT | Performed by: STUDENT IN AN ORGANIZED HEALTH CARE EDUCATION/TRAINING PROGRAM

## 2022-04-01 PROCEDURE — G0463 HOSPITAL OUTPT CLINIC VISIT: HCPCS

## 2022-04-01 RX ORDER — NITROGLYCERIN 0.4 MG/1
TABLET SUBLINGUAL
Qty: 30 TABLET | Refills: 1 | Status: SHIPPED | OUTPATIENT
Start: 2022-04-01 | End: 2023-03-09

## 2022-04-01 ASSESSMENT — PAIN SCALES - GENERAL: PAINLEVEL: NO PAIN (0)

## 2022-04-01 NOTE — TELEPHONE ENCOUNTER
Pharmacy was called and they confirm that they have orders and no refill needs to be sent.   Patient was called and given this information.      Kathleen M Doege RN

## 2022-04-01 NOTE — NURSING NOTE
Chief Complaint   Patient presents with     Follow Up     Yu F/U, F/U post coronary angiogram with intervention     Vitals were taken and medications reconciled.    Jose Alejandro Reed, EMT  8:03 AM

## 2022-04-01 NOTE — TELEPHONE ENCOUNTER
[facility] Call Center    Phone Message    May a detailed message be left on voicemail: yes     Reason for Call: Medication Refill Request    Has the patient contacted the pharmacy for the refill? Yes   Name of medication being requested: dulaglutide (TRULICITY) 0.75 MG/0.5ML pen  Provider who prescribed the medication: Sweetie Edward  [facility] - [place], [place] - [address]  Date medication is needed: as soon as possible         Action Taken: Message routed to:  [facility] ([facility] endo    Travel Screening: Not Applicable

## 2022-04-01 NOTE — PATIENT INSTRUCTIONS
Por favor cherrie nitroglycerin, se derrite debajo de la lengua. Solo cuando tenga dolor. Si todavia le duele a los 5 mins,  [place] dyana segunda pastilla. Si el dolor sigue a los 5 mins de la segunda dosis LLame el 911.

## 2022-04-01 NOTE — NURSING NOTE
April 1, 2022    Medication Changes: Nitroglycerin PRN ordered      Follow Up: With Dr. Nicholas in 6 months      Patient verbalized understanding of all health information given and agreed to call with further questions or concerns.      Kim Chew RN

## 2022-04-01 NOTE — PROGRESS NOTES
Chief complaint: Shortness of breath    HPI:   Loy Limon is a 66 year old male with history of HCC status post liver transplant on 2018, hypertension hyperlipidemia and prior history of smoking who presents for evaluation of shortness of breath.     Patient reports that since he got his PCI his chest pain and shortness of breath has significant improved but he still have mild chest pain that has started around few weeks ago. He reports that this chest discomfort mostly happens at night when he goes to bed and lays on his left side, it does not happen when when he walks or does physical activities.     The patient's risk factor profile is: (+) HTN, (+) diabetes, (+) hyperlipidemia, (+) prior tobacco use, (-) family Hx CAD.     He denies any PND, orthopnea, peripheral edema, palpitations, lightheadedness or syncope.       PAST MEDICAL HISTORY:  Past Medical History:   Diagnosis Date     Anemia      Biliary stricture of transplanted liver (H) 2018     BPH (benign prostatic hyperplasia)      Cancer (H)     hepatocellualr carcinoma     Cirrhosis of liver (H) 2018     Diabetes (H)      Enlarged prostate      Hypothyroidism      Impotence of organic origin 2020     Inguinal hernia     Repaired with mesh on 18     Liver lesion 2018     Liver transplanted (H) 2018     donor liver transplant     Portal vein thrombosis     on path explant     Postoperative atrial fibrillation (H) 2018     Prostate cancer (H)      TB lung, latent     Treated        CURRENT MEDICATIONS:  Current Outpatient Medications   Medication Sig Dispense Refill     amLODIPine (NORVASC) 5 MG tablet Take 1 tablet (5 mg) by mouth daily 90 tablet 3     atorvastatin (LIPITOR) 40 MG tablet Take 1 tablet (40 mg) by mouth daily 90 tablet 3     blood glucose (NO BRAND SPECIFIED) lancets standard Use to test blood sugar 4 times daily or as directed. 400 each 2     blood glucose (ONETOUCH VERIO IQ) test  strip Use to test blood sugar 4 times daily or as directed. 400 strip 3     blood glucose monitoring (NO BRAND SPECIFIED) meter device kit Use to test blood sugar 4 times daily or as directed. Please let the pt know when it is ready to . 1 kit 0     dulaglutide (TRULICITY) 0.75 MG/0.5ML pen Inject 1.5 mg Subcutaneous every 7 days 13 mL 3     glucose 40 % (400 mg/mL) gel Take 15-30 g by mouth every 15 minutes as needed for low blood sugar 30 g 3     insulin glargine (LANTUS PEN) 100 UNIT/ML pen Take 15 units in the morning and 15 units in the evening 15 mL 1     insulin glargine (LANTUS SOLOSTAR) 100 UNIT/ML pen 12 Units Inject 14 units subcutaneous daily. 15 mL 1     insulin pen needle (BD KIRK U/F) 32G X 4 MM miscellaneous Use 1 daily. 100 each 1     Lancets (ONETOUCH DELICA PLUS [id number]) MISC U TO TEST QID OR UTD       levothyroxine (SYNTHROID/LEVOTHROID) 125 MCG tablet Take 1 tablet (125 mcg) by mouth daily 90 tablet 3     levothyroxine (SYNTHROID/LEVOTHROID) 125 MCG tablet Take 1 tablet (125 mcg) by mouth daily 90 tablet 3     losartan (COZAAR) 50 MG tablet Take 2 tablets (100 mg) by mouth daily 180 tablet 0     metFORMIN (GLUCOPHAGE-XR) 500 MG 24 hr tablet Take 2 tablets (1,000 mg) by mouth 2 times daily (with meals) 360 tablet 3     Metoprolol Tartrate 75 MG TABS Take 1 tablet by mouth 2 times daily 240 tablet 0     mycophenolate (GENERIC EQUIVALENT) 250 MG capsule Take 3 capsules (750 mg) by mouth 2 times daily 540 capsule 3     nitroGLYcerin (NITROSTAT) 0.4 MG sublingual tablet For chest pain place 1 tablet under the tongue every 5 minutes for 3 doses. If symptoms persist 5 minutes after 1st dose call 911. 30 tablet 1     predniSONE (DELTASONE) 5 MG tablet Take 1 tablet (5 mg) by mouth daily 90 tablet 3     Sharps Container MISC 1 each daily 1 each 2     ticagrelor (BRILINTA) 90 MG tablet Take 1 tablet (90 mg) by mouth 2 times daily 180 tablet 3     ursodiol (ACTIGALL) 250 MG tablet Take 1 tablet  (250 mg) by mouth 2 times daily 180 tablet 3     Vitamin D3 (CHOLECALCIFEROL) 25 mcg (1000 units) tablet Take 2 tablets (50 mcg) by mouth daily 180 tablet 3       PAST SURGICAL HISTORY:  Past Surgical History:   Procedure Laterality Date     APPENDECTOMY       COLONOSCOPY      2018     CV CORONARY ANGIOGRAM N/A 10/13/2021    Procedure: CV CORONARY ANGIOGRAM;  Surgeon: Yaya Hampton MD;  Location:  [facility] CATH [facility]     CV CORONARY LITHOTRIPSY PCI N/A 10/13/2021    Procedure: CV Coronary Lithotripsy PCI;  Surgeon: Yaya Hampton MD;  Location:  [facility] CATH [facility]     CV INSTANTANEOUS WAVE-FREE RATIO N/A 10/13/2021    Procedure: Instantaneous Wave-Free Ratio;  Surgeon: Yaya Hampton MD;  Location:  [facility] CARDI[facility] CATH LAB     CV INTRAVASULAR ULTRASOUND N/A 10/13/2021    Procedure: Intravascular Ultrasound;  Surgeon: Yaya Hampton MD;  Location:  [facility] CATH LAB     CV PCI STENT DRUG ELUTING N/A 10/13/2021    Procedure: Percutaneous Coronary Intervention Stent Drug Eluting;  Surgeon: Yaya Hampton MD;  Location:  [facility] CARD[facility] CATH LAB     DAVINCI PROSTATECTOMY N/A 1/3/2020    Procedure: Robot-assisted laparoscopic radical prostatectomy, Bladder biopsy;  Surgeon: Kenrick Casiano MD;  [facility]: [facility]     ENDOSCOPIC RETROGRADE CHOLANGIOPANCREATOGRAM N/A 12/28/2018    Procedure: ENDOSCOPIC RETROGRADE CHOLANGIOPANCREATOGRAM, with Billary Sphincterotomy and Billary Stent Placement;  Surgeon: Omero Lawler MD;  [facility]     ENDOSCOPIC RETROGRADE CHOLANGIOPANCREATOGRAM  2/18/2019    Procedure: COMBINED ENDOSCOPIC RETROGRADE CHOLANGIOPANCREATOGRAPHY, Bile Duct Stent Exchange;  Surgeon: Omero Lawler MD;  [facility]     ENDOSCOPIC RETROGRADE CHOLANGIOPANCREATOGRAM N/A 4/29/2019    Procedure: ENDOSCOPIC RETROGRADE CHOLANGIOPANCREATOGRAPHY, WITH BILIARY STENT REMOVAL AND STONE  "EXTRACTION;  Surgeon: Omero Lawler MD;  [facility]     HERNIORRHAPHY INGUINAL  2018    Procedure: Herniorrhaphy inguinal;  Surgeon: Emil Echevarria MD;  [facility]     IR LIVER BIOPSY PERCUTANEOUS  2018     LAPAROTOMY EXPLORATORY      per the patient \"for infection in the LLQ\"      TRANSPLANT LIVER RECIPIENT  DONOR N/A 2018    Procedure: TRANSPLANT LIVER RECIPIENT  DONOR;  Surgeon: Emil Echevarria MD;  [facility]       ALLERGIES:   No Known Allergies    FAMILY HISTORY:  Family History   Problem Relation Age of Onset     Memory loss Mother      Liver Disease Brother      Skin Cancer No family hx of      Melanoma No family hx of      No family history of premature CAD or sudden death.    SOCIAL HISTORY:  Social History     Tobacco Use     Smoking status: Former Smoker     Packs/day: 0.03     Years: 20.00     Pack years: 0.60     Types: Cigarettes, Cigars     Start date: 1970     Quit date: 3/14/2018     Years since quittin.0     Smokeless tobacco: Never Used     Tobacco comment: Quit smoking 2018, one cigarette after dinner   Substance Use Topics     Alcohol use: No     Comment: Quit drinking 2018     Drug use: No       ROS:   A comprehensive 14 point review of systems is negative other than as mentioned in HPI.    Exam:  /47 (BP Location: Right arm, Patient Position: Chair, Cuff Size: Adult Regular)   Pulse 55   Wt 89.8 kg (198 lb)   SpO2 97%   BMI 31.01 kg/m    GENERAL APPEARANCE: healthy, alert and no distress  EYES: no icterus, no xanthelasmas  ENT: normal palate, mucosa moist, no central cyanosis  NECK: JVP not elevated  RESPIRATORY: lungs clear to auscultation - no rales, rhonchi or wheezes, no use of accessory muscles, no retractions, respirations are unlabored, normal respiratory rate  CARDIOVASCULAR: regular rhythm, normal S1 with physiologic split S2, no S3 or S4 and no murmur, click or rub.  GI: soft, non " tender, bowel sounds normal,no abdominal bruits  EXTREMITIES: no edema, no bruits  NEURO: alert and oriented to person/place/time, normal speech, gait and affect  VASC: Radial, dorsalis pedis and posterior tibialis pulses 2+ bilaterally.  SKIN: no ecchymoses, no rashes.  PSYCH: cooperative, affect appropriate.     Labs:  Reviewed.     Testing/Procedures:  I personally visualized and interpreted:  Stress echocardiogram from 7/7/2021 with submaximal heart rate of 71% and also stopped due to chest pain.     Coronary angiogram from 10/5/2021 show one-vessel CAD with 60% mid LAD stenosis hemodynamically significant status post TIA to the mid LAD with a 3.5 x 38 mm SANTIAGO.     Assessment and Plan:   Loy Limon is a 66 year old male with history of HCC status post liver transplant on 12/20/2018, hypertension hyperlipidemia and prior history of smoking who presents for evaluation of shortness of breath.     Coronary artery disease, one-vessel CAD with 60% mid LAD stenosis hemodynamically significant status post TIA to the mid LAD with a 3.5 x 38 mm SANTIAGO.  No hemodynamic significant stenosis of the left circumflex or the RCA.  His LDL has significantly improved on most recent check with current LDL at goal 74 mg/dL from 311 mg/dL on September 2021. He does have mild atypical chest pain, will consider a coronary CTA to further evaluate if there is a progression of his symptoms.      - Continue atorvastatin 40 mg  - Continue ticagrelor BID, patient was only taking it once a day. I discuss with patient about taking it BID.   - Continue healthy lifestyle and exercise of at least 150 minutes a week    Follow up in 6 months   Chart review time: 30 minutes  Visit time: 30 minutes  Total time spent: 60 minutes  >50% of this 30-minute visit were spent with the patient on in-person counseling and discussion regarding coronary artery disease.     The patient states understanding and is agreeable with plan.     Theron Nicholas,  MD  Cardiology    [facility]  [facility]

## 2022-04-01 NOTE — LETTER
2022      RE: Loy Limon  [address]  [facility] [id number]       Dear Colleague,    Thank you for the opportunity to participate in the care of your patient, Loy Limon, at the [facility] [place] at [facility]. Please see a copy of my visit note below.    Chief complaint: Shortness of breath    HPI:   Loy Limon is a 66 year old male with history of HCC status post liver transplant on 2018, hypertension hyperlipidemia and prior history of smoking who presents for evaluation of shortness of breath.     Patient reports that since he got his PCI his chest pain and shortness of breath has significant improved but he still have mild chest pain that has started around few weeks ago. He reports that this chest discomfort mostly happens at night when he goes to bed and lays on his left side, it does not happen when when he walks or does physical activities.     The patient's risk factor profile is: (+) HTN, (+) diabetes, (+) hyperlipidemia, (+) prior tobacco use, (-) family Hx CAD.     He denies any PND, orthopnea, peripheral edema, palpitations, lightheadedness or syncope.       PAST MEDICAL HISTORY:  Past Medical History:   Diagnosis Date     Anemia      Biliary stricture of transplanted liver (H) 2018     BPH (benign prostatic hyperplasia)      Cancer (H)     hepatocellualr carcinoma     Cirrhosis of liver (H) 2018     Diabetes (H)      Enlarged prostate      Hypothyroidism      Impotence of organic origin 2020     Inguinal hernia     Repaired with mesh on 18     Liver lesion 2018     Liver transplanted (H) 2018     donor liver transplant     Portal vein thrombosis     on path explant     Postoperative atrial fibrillation (H) 2018     Prostate cancer (H)      TB lung, latent     Treated        CURRENT MEDICATIONS:  Current Outpatient Medications   Medication  Sig Dispense Refill     amLODIPine (NORVASC) 5 MG tablet Take 1 tablet (5 mg) by mouth daily 90 tablet 3     atorvastatin (LIPITOR) 40 MG tablet Take 1 tablet (40 mg) by mouth daily 90 tablet 3     blood glucose (NO BRAND SPECIFIED) lancets standard Use to test blood sugar 4 times daily or as directed. 400 each 2     blood glucose (ONETOUCH VERIO IQ) test strip Use to test blood sugar 4 times daily or as directed. 400 strip 3     blood glucose monitoring (NO BRAND SPECIFIED) meter device kit Use to test blood sugar 4 times daily or as directed. Please let the pt know when it is ready to . 1 kit 0     dulaglutide (TRULICITY) 0.75 MG/0.5ML pen Inject 1.5 mg Subcutaneous every 7 days 13 mL 3     glucose 40 % (400 mg/mL) gel Take 15-30 g by mouth every 15 minutes as needed for low blood sugar 30 g 3     insulin glargine (LANTUS PEN) 100 UNIT/ML pen Take 15 units in the morning and 15 units in the evening 15 mL 1     insulin glargine (LANTUS SOLOSTAR) 100 UNIT/ML pen 12 Units Inject 14 units subcutaneous daily. 15 mL 1     insulin pen needle (BD KIRK U/F) 32G X 4 MM miscellaneous Use 1 daily. 100 each 1     Lancets (ONETOUCH DELICA PLUS [id number]) MISC U TO TEST QID OR UTD       levothyroxine (SYNTHROID/LEVOTHROID) 125 MCG tablet Take 1 tablet (125 mcg) by mouth daily 90 tablet 3     levothyroxine (SYNTHROID/LEVOTHROID) 125 MCG tablet Take 1 tablet (125 mcg) by mouth daily 90 tablet 3     losartan (COZAAR) 50 MG tablet Take 2 tablets (100 mg) by mouth daily 180 tablet 0     metFORMIN (GLUCOPHAGE-XR) 500 MG 24 hr tablet Take 2 tablets (1,000 mg) by mouth 2 times daily (with meals) 360 tablet 3     Metoprolol Tartrate 75 MG TABS Take 1 tablet by mouth 2 times daily 240 tablet 0     mycophenolate (GENERIC EQUIVALENT) 250 MG capsule Take 3 capsules (750 mg) by mouth 2 times daily 540 capsule 3     nitroGLYcerin (NITROSTAT) 0.4 MG sublingual tablet For chest pain place 1 tablet under the tongue every 5 minutes for 3  doses. If symptoms persist 5 minutes after 1st dose call 911. 30 tablet 1     predniSONE (DELTASONE) 5 MG tablet Take 1 tablet (5 mg) by mouth daily 90 tablet 3     Sharps Container MISC 1 each daily 1 each 2     ticagrelor (BRILINTA) 90 MG tablet Take 1 tablet (90 mg) by mouth 2 times daily 180 tablet 3     ursodiol (ACTIGALL) 250 MG tablet Take 1 tablet (250 mg) by mouth 2 times daily 180 tablet 3     Vitamin D3 (CHOLECALCIFEROL) 25 mcg (1000 units) tablet Take 2 tablets (50 mcg) by mouth daily 180 tablet 3       PAST SURGICAL HISTORY:  Past Surgical History:   Procedure Laterality Date     APPENDECTOMY       COLONOSCOPY      2018     CV CORONARY ANGIOGRAM N/A 10/13/2021    Procedure: CV CORONARY ANGIOGRAM;  Surgeon: Yaya Hampton MD;  Location: [facility] CARDIAC CATH LAB     CV CORONARY LITHOTRIPSY PCI N/A 10/13/2021    Procedure: CV Coronary Lithotripsy PCI;  Surgeon: Yaya Hampton MD;  Location: [facility] CARDIAC CATH LAB     CV INSTANTANEOUS WAVE-FREE RATIO N/A 10/13/2021    Procedure: Instantaneous Wave-Free Ratio;  Surgeon: Yaya Hampton MD;  Location: [facility] CARDIAC CATH LAB     CV INTRAVASULAR ULTRASOUND N/A 10/13/2021    Procedure: Intravascular Ultrasound;  Surgeon: Yaya Hampton MD;  Location: [facility] CARDIAC CATH LAB     CV PCI STENT DRUG ELUTING N/A 10/13/2021    Procedure: Percutaneous Coronary Intervention Stent Drug Eluting;  Surgeon: Yaya Hampton MD;  Location: [facility] CARDIAC CATH LAB     DAVINCI PROSTATECTOMY N/A 1/3/2020    Procedure: Robot-assisted laparoscopic radical prostatectomy, Bladder biopsy;  Surgeon: Kenrick Casiano MD;  Location:  OR     ENDOSCOPIC RETROGRADE CHOLANGIOPANCREATOGRAM N/A 12/28/2018    Procedure: ENDOSCOPIC RETROGRADE CHOLANGIOPANCREATOGRAM, with Billary Sphincterotomy and Billary Stent Placement;  Surgeon: Omero Lawler MD;  [facility]     ENDOSCOPIC  "RETROGRADE CHOLANGIOPANCREATOGRAM  2019    Procedure: COMBINED ENDOSCOPIC RETROGRADE CHOLANGIOPANCREATOGRAPHY, Bile Duct Stent Exchange;  Surgeon: Omero Lawler MD;  [facility]     ENDOSCOPIC RETROGRADE CHOLANGIOPANCREATOGRAM N/A 2019    Procedure: ENDOSCOPIC RETROGRADE CHOLANGIOPANCREATOGRAPHY, WITH BILIARY STENT REMOVAL AND STONE EXTRACTION;  Surgeon: Omero Lawler MD;  [facility]     HERNIORRHAPHY INGUINAL  2018    Procedure: Herniorrhaphy inguinal;  Surgeon: Emil Echevarria MD;  [facility]     IR LIVER BIOPSY PERCUTANEOUS  2018     LAPAROTOMY EXPLORATORY      per the patient \"for infection in the LLQ\"      TRANSPLANT LIVER RECIPIENT  DONOR N/A 2018    Procedure: TRANSPLANT LIVER RECIPIENT  DONOR;  Surgeon: Emil Echevarria MD;  [facility]       ALLERGIES:   No Known Allergies    FAMILY HISTORY:  Family History   Problem Relation Age of Onset     Memory loss Mother      Liver Disease Brother      Skin Cancer No family hx of      Melanoma No family hx of      No family history of premature CAD or sudden death.    SOCIAL HISTORY:  Social History     Tobacco Use     Smoking status: Former Smoker     Packs/day: 0.03     Years: 20.00     Pack years: 0.60     Types: Cigarettes, Cigars     Start date: 1970     Quit date: 3/14/2018     Years since quittin.0     Smokeless tobacco: Never Used     Tobacco comment: Quit smoking 2018, one cigarette after dinner   Substance Use Topics     Alcohol use: No     Comment: Quit drinking 2018     Drug use: No       ROS:   A comprehensive 14 point review of systems is negative other than as mentioned in HPI.    Exam:  /47 (BP Location: Right arm, Patient Position: Chair, Cuff Size: Adult Regular)   Pulse 55   Wt 89.8 kg (198 lb)   SpO2 97%   BMI 31.01 kg/m    GENERAL APPEARANCE: healthy, alert and no distress  EYES: no icterus, no xanthelasmas  ENT: normal palate, " mucosa moist, no central cyanosis  NECK: JVP not elevated  RESPIRATORY: lungs clear to auscultation - no rales, rhonchi or wheezes, no use of accessory muscles, no retractions, respirations are unlabored, normal respiratory rate  CARDIOVASCULAR: regular rhythm, normal S1 with physiologic split S2, no S3 or S4 and no murmur, click or rub.  GI: soft, non tender, bowel sounds normal,no abdominal bruits  EXTREMITIES: no edema, no bruits  NEURO: alert and oriented to person/place/time, normal speech, gait and affect  VASC: Radial, dorsalis pedis and posterior tibialis pulses 2+ bilaterally.  SKIN: no ecchymoses, no rashes.  PSYCH: cooperative, affect appropriate.     Labs:  Reviewed.     Testing/Procedures:  I personally visualized and interpreted:  Stress echocardiogram from 7/7/2021 with submaximal heart rate of 71% and also stopped due to chest pain.     Coronary angiogram from 10/5/2021 show one-vessel CAD with 60% mid LAD stenosis hemodynamically significant status post TIA to the mid LAD with a 3.5 x 38 mm SANTIAGO.     Assessment and Plan:   Loy Limon is a 66 year old male with history of HCC status post liver transplant on 12/20/2018, hypertension hyperlipidemia and prior history of smoking who presents for evaluation of shortness of breath.     Coronary artery disease, one-vessel CAD with 60% mid LAD stenosis hemodynamically significant status post TIA to the mid LAD with a 3.5 x 38 mm SANTIAGO.  No hemodynamic significant stenosis of the left circumflex or the RCA.  His LDL has significantly improved on most recent check with current LDL at goal 74 mg/dL from 311 mg/dL on September 2021. He does have mild atypical chest pain, will consider a coronary CTA to further evaluate if there is a progression of his symptoms.      - Continue atorvastatin 40 mg  - Continue ticagrelor BID, patient was only taking it once a day. I discuss with patient about taking it BID.   - Continue healthy lifestyle and exercise of at  least 150 minutes a week    Follow up in 6 months   Chart review time: 30 minutes  Visit time: 30 minutes  Total time spent: 60 minutes  >50% of this 30-minute visit were spent with the patient on in-person counseling and discussion regarding coronary artery disease.     The patient states understanding and is agreeable with plan.           Please do not hesitate to contact me if you have any questions/concerns.     Sincerely,     Yu Darby MD

## 2022-04-06 ENCOUNTER — APPOINTMENT (OUTPATIENT)
Dept: INTERPRETER SERVICES | Facility: CLINIC | Age: 67
End: 2022-04-06
Payer: COMMERCIAL

## 2022-04-26 ENCOUNTER — OFFICE VISIT (OUTPATIENT)
Dept: FAMILY MEDICINE | Facility: CLINIC | Age: 67
End: 2022-04-26
Payer: COMMERCIAL

## 2022-04-26 VITALS
TEMPERATURE: 97.6 F | OXYGEN SATURATION: 99 % | HEIGHT: 67 IN | HEART RATE: 68 BPM | SYSTOLIC BLOOD PRESSURE: 158 MMHG | BODY MASS INDEX: 31.19 KG/M2 | RESPIRATION RATE: 14 BRPM | DIASTOLIC BLOOD PRESSURE: 76 MMHG | WEIGHT: 198.7 LBS

## 2022-04-26 DIAGNOSIS — I10 ESSENTIAL HYPERTENSION: Primary | ICD-10-CM

## 2022-04-26 DIAGNOSIS — E11.65 UNCONTROLLED TYPE 2 DIABETES MELLITUS WITH HYPERGLYCEMIA (H): ICD-10-CM

## 2022-04-26 PROCEDURE — 99214 OFFICE O/P EST MOD 30 MIN: CPT | Mod: 25 | Performed by: FAMILY MEDICINE

## 2022-04-26 PROCEDURE — 91306 COVID-19,PF,MODERNA (18+ YRS BOOSTER .25ML): CPT | Performed by: FAMILY MEDICINE

## 2022-04-26 PROCEDURE — 0064A COVID-19,PF,MODERNA (18+ YRS BOOSTER .25ML): CPT | Performed by: FAMILY MEDICINE

## 2022-04-26 ASSESSMENT — PAIN SCALES - GENERAL: PAINLEVEL: NO PAIN (0)

## 2022-04-26 NOTE — NURSING NOTE
Chief Complaint   Patient presents with     Diabetes     Patient comes in to discuss diabetes and hypertension.         Ridge Post MA on 4/26/2022 at 7:37 AM

## 2022-04-26 NOTE — PROGRESS NOTES
"     Assessment & Plan     Uncontrolled type 2 diabetes mellitus with hyperglycemia (H)  Boost covid. Cont meds for DM, keep May endo visit  - COVID-19,PF,MODERNA (18+ YRS BOOSTER .25ML)  - dulaglutide (TRULICITY) 0.75 MG/0.5ML pen; Inject 1.5 mg Subcutaneous every 7 days    Essential hypertension  Discuss at renal next week        23 minutes spent on the date of the encounter doing chart review, history and exam, documentation and further activities per the note    BMI:   Estimated body mass index is 31.12 kg/m  as calculated from the following:    Height as of this encounter: 1.702 m (5' 7\").    Weight as of this encounter: 90.1 kg (198 lb 11.2 oz).     Return in about 3 months (around 2022).    Jaswinder Anne MD  [facility] [place]    Teddy Granado is a 66 year old who presents for the following health issues     HPI Here for a f/u.  Has Trulicity refills but thinks running out I'll refill  Per pt home BG am running 120 max, sees endo in a mo  BP up now, sees renal next week, I'll leave BP meds to them  Multiple interval MD vistis/notes/plans rvwd and discussed w/ him  Per pt sees eye MD routinely  Due for fourth covid immuniztion, discussed    Past Medical History:   Diagnosis Date     Anemia      Biliary stricture of transplanted liver (H) 2018     BPH (benign prostatic hyperplasia)      Cancer (H)     hepatocellualr carcinoma     Cirrhosis of liver (H) 2018     Diabetes (H)      Enlarged prostate      Hypothyroidism      Impotence of organic origin 2020     Inguinal hernia     Repaired with mesh on 18     Liver lesion 2018     Liver transplanted (H) 2018     donor liver transplant     Portal vein thrombosis     on path explant     Postoperative atrial fibrillation (H) 2018     Prostate cancer (H)      TB lung, latent     Treated      Past Surgical History:   Procedure Laterality Date     APPENDECTOMY       " COLONOSCOPY      2018     CV CORONARY ANGIOGRAM N/A 10/13/2021    Procedure: CV CORONARY ANGIOGRAM;  Surgeon: Yaya Hampton MD;  [facility]     CV CORONARY LITHOTRIPSY PCI N/A 10/13/2021    Procedure: CV Coronary Lithotripsy PCI;  Surgeon: Yaya Hampton MD;  [facility]     CV INSTANTANEOUS WAVE-FREE RATIO N/A 10/13/2021    Procedure: Instantaneous Wave-Free Ratio;  Surgeon: Yaya Hampton MD;  [facility]     CV INTRAVASULAR ULTRASOUND N/A 10/13/2021    Procedure: Intravascular Ultrasound;  Surgeon: Yaya Hampton MD;  [facility]     CV PCI STENT DRUG ELUTING N/A 10/13/2021    Procedure: Percutaneous Coronary Intervention Stent Drug Eluting;  Surgeon: Yaya Hampton MD;  [facility]     DAVINCI PROSTATECTOMY N/A 1/3/2020    Procedure: Robot-assisted laparoscopic radical prostatectomy, Bladder biopsy;  Surgeon: Kenrick Casiano MD;  [facility]     ENDOSCOPIC RETROGRADE CHOLANGIOPANCREATOGRAM N/A 12/28/2018    Procedure: ENDOSCOPIC RETROGRADE CHOLANGIOPANCREATOGRAM, with Billary Sphincterotomy and Billary Stent Placement;  Surgeon: Omero Lawler MD;  [facility]     ENDOSCOPIC RETROGRADE CHOLANGIOPANCREATOGRAM  2/18/2019    Procedure: COMBINED ENDOSCOPIC RETROGRADE CHOLANGIOPANCREATOGRAPHY, Bile Duct Stent Exchange;  Surgeon: Omero Lawler MD;  [facility]     ENDOSCOPIC RETROGRADE CHOLANGIOPANCREATOGRAM N/A 4/29/2019    Procedure: ENDOSCOPIC RETROGRADE CHOLANGIOPANCREATOGRAPHY, WITH BILIARY STENT REMOVAL AND STONE EXTRACTION;  Surgeon: Omero Lawler MD;  [facility]     HERNIORRHAPHY INGUINAL  12/22/2018    Procedure: Herniorrhaphy inguinal;  Surgeon: Emil Echevarria MD;  [facility]     IR LIVER BIOPSY PERCUTANEOUS  12/31/2018     LAPAROTOMY  "EXPLORATORY      per the patient \"for infection in the LLQ\"      TRANSPLANT LIVER RECIPIENT  DONOR N/A 2018    Procedure: TRANSPLANT LIVER RECIPIENT  DONOR;  Surgeon: Emil Echevarria MD;  [facility]     Current Outpatient Medications   Medication     amLODIPine (NORVASC) 5 MG tablet     atorvastatin (LIPITOR) 40 MG tablet     blood glucose (NO BRAND SPECIFIED) lancets standard     blood glucose (ONETOUCH VERIO IQ) test strip     blood glucose monitoring (NO BRAND SPECIFIED) meter device kit     dulaglutide (TRULICITY) 0.75 MG/0.5ML pen     glucose 40 % (400 mg/mL) gel     insulin glargine (LANTUS PEN) 100 UNIT/ML pen     insulin glargine (LANTUS PEN) 100 UNIT/ML pen     insulin pen needle (BD KIRK U/F) 32G X 4 MM miscellaneous     Lancets (ONETOUCH DELICA PLUS [id number]) MISC     levothyroxine (SYNTHROID/LEVOTHROID) 125 MCG tablet     levothyroxine (SYNTHROID/LEVOTHROID) 125 MCG tablet     losartan (COZAAR) 50 MG tablet     metFORMIN (GLUCOPHAGE-XR) 500 MG 24 hr tablet     Metoprolol Tartrate 75 MG TABS     mycophenolate (GENERIC EQUIVALENT) 250 MG capsule     nitroGLYcerin (NITROSTAT) 0.4 MG sublingual tablet     predniSONE (DELTASONE) 5 MG tablet     Sharps Container MISC     ticagrelor (BRILINTA) 90 MG tablet     ursodiol (ACTIGALL) 250 MG tablet     Vitamin D3 (CHOLECALCIFEROL) 25 mcg (1000 units) tablet     No current facility-administered medications for this visit.     No Known Allergies          Review of Systems         Objective    BP (!) 158/76   Pulse 68   Temp 97.6  F (36.4  C) (Oral)   Resp 14   Ht 1.702 m (5' 7\")   Wt 90.1 kg (198 lb 11.2 oz)   SpO2 99%   BMI 31.12 kg/m    Body mass index is 31.12 kg/m .  Physical Exam   GENERAL: healthy, alert and no distress  NECK: no adenopathy, no asymmetry, masses, or scars and thyroid normal to palpation  RESP: lungs clear to auscultation - no rales, rhonchi or wheezes  CV: regular rate and rhythm, normal S1 S2, no S3 or " S4, no murmur, click or rub, no peripheral edema and peripheral pulses strong  ABDOMEN: soft, nontender, no hepatosplenomegaly, no masses and bowel sounds normal  MS: no gross musculoskeletal defects noted, no edema

## 2022-04-28 DIAGNOSIS — N18.2 CKD (CHRONIC KIDNEY DISEASE) STAGE 2, GFR 60-89 ML/MIN: Primary | ICD-10-CM

## 2022-04-29 ENCOUNTER — TELEPHONE (OUTPATIENT)
Dept: NEPHROLOGY | Facility: CLINIC | Age: 67
End: 2022-04-29
Payer: COMMERCIAL

## 2022-04-29 NOTE — TELEPHONE ENCOUNTER
Called patient via  services with a appointment reminder for Wed. 5/4/22 @12noon with Nai Juarez and a lab draw @ 11:00 am.

## 2022-05-03 NOTE — PROGRESS NOTES
[facility] Dermatology Note  Encounter Date: May 4, 2022  Office Visit     Dermatology Problem List:  1. s/p Liver transplant 12/2018 2/2 EtOH cirrhosis c/b HCC, on tacrolimus  2. Skin sensitivity/peripheral neuropathy post transplant in setting of new diagnosis of diabetes (A1c 8.6%; up from 5.8% pre-transplant)  3. Hyperhidrosis of the soles of feet  - Current Tx: Aluminum chloride 20% solution     ____________________________________________    Assessment & Plan:    # History of liver transplantation on tacrolimus.   - Discussed increased risk for skin cancer in organ transplant recipients due to iatrogenic immunosuppression.   - Sun precaution was advised including the use of sun screens of SPF 30 or higher, sun protective clothing, and avoidance of tanning beds.   - Continue annual skin examinations.     # Benign lesions: Multiple benign nevi, solar lentigos, seborrheic keratoses, cherry angiomas. Explained to patient benign nature of lesion. No treatment is necessary at this time unless the lesion changes or becomes symptomatic.   - ABCDs of melanoma were discussed and self skin checks were advised.  - Sun precaution was advised including the use of sun screens of SPF 30 or higher, sun protective clothing, and avoidance of tanning beds.     Procedures Performed:   None    Follow-up: 2 year(s) in-person, or earlier for new or changing lesions    Staff and Scribe:     Scribe Disclosure:  I, Ramos Wall, am serving as a scribe to document services personally performed by Kevin Gonzalez MD based on data collection and the provider's statements to me.     Provider Disclosure:   The documentation recorded by the scribe accurately reflects the services I personally performed and the decisions made by me.    Kevin Gonzalez MD    Department of Dermatology  [facility]: Phone: [phone],  Fax:[phone]  [facility]: Phone: [phone] Fax: [phone]  ____________________________________________    CC: Skin Check (Foot feels better, states he is worried about lab time)    HPI:  Mr. Loy Limon is a(n) 66 year old male who presents today as a return patient for FBSE. Last seen by me on 9/24/2020, at which time no lesions of concern were noted.    Today, patient reports that his sweating has not been bothersome. He has not been using aluminum chloride.  Denies particular spots of concern on his skin today. He has been using sunscreen. Denies personal or family history of skin cancer. No history of indoor tanning bed use or outdoor work.    Patient is otherwise feeling well, without additional skin concerns.    Labs Reviewed:  N/A    Physical Exam:  Vitals: There were no vitals taken for this visit.  SKIN: Full skin, which includes the head/face, both arms, chest, back, abdomen,both legs, genitalia and/or groin buttocks, digits and/or nails, was examined.  - There are dome shaped bright red papules on the trunk and extremities.   - Multiple regular brown pigmented macules and papules are identified on the trunk and extremities.   - Scattered brown macules on sun exposed areas.  - There are waxy stuck on tan to brown papules on the trunk and extremities.  - No other lesions of concern on areas examined.     Medications:  Current Outpatient Medications   Medication     amLODIPine (NORVASC) 5 MG tablet     atorvastatin (LIPITOR) 40 MG tablet     blood glucose (NO BRAND SPECIFIED) lancets standard     blood glucose (ONETOUCH VERIO IQ) test strip     blood glucose monitoring (NO BRAND SPECIFIED) meter device kit     dulaglutide (TRULICITY) 0.75 MG/0.5ML pen     glucose 40 % (400 mg/mL) gel     insulin glargine (LANTUS PEN) 100 UNIT/ML pen     insulin glargine (LANTUS PEN) 100 UNIT/ML pen     insulin pen needle (BD KIRK U/F) 32G X 4 MM miscellaneous      Lancets (ONETOUCH DELICA PLUS [id number]) MISC     levothyroxine (SYNTHROID/LEVOTHROID) 125 MCG tablet     levothyroxine (SYNTHROID/LEVOTHROID) 125 MCG tablet     losartan (COZAAR) 50 MG tablet     metFORMIN (GLUCOPHAGE-XR) 500 MG 24 hr tablet     Metoprolol Tartrate 75 MG TABS     mycophenolate (GENERIC EQUIVALENT) 250 MG capsule     nitroGLYcerin (NITROSTAT) 0.4 MG sublingual tablet     predniSONE (DELTASONE) 5 MG tablet     Sharps Container MISC     ticagrelor (BRILINTA) 90 MG tablet     ursodiol (ACTIGALL) 250 MG tablet     Vitamin D3 (CHOLECALCIFEROL) 25 mcg (1000 units) tablet     No current facility-administered medications for this visit.      Past Medical History:   Patient Active Problem List   Diagnosis     Cirrhosis of liver (H)     Liver lesion     Liver transplant recipient (H)     Immunosuppressed status (H)     Hypervolemia     Atrial fibrillation with rapid ventricular response (H)     Hematuria     Bilateral wheezing     Hypoxia     Hyperglycemia     Pre-diabetes     Essential hypertension     Constipation     Biliary stricture of transplanted liver (H)     Drug-induced diabetes mellitus (H)     Muscle tension dysphonia     Laennec's cirrhosis (H)     Elevated ferritin     Inguinal hernia of right side with obstruction and without gangrene     Right sided abdominal pain     Tobacco use disorder     Prostate cancer (H)     Diabetes mellitus, type 2 (H)     Persistent proteinuria     Chronic kidney disease, stage 2 (mild)     Hypothyroidism, unspecified type     Status post coronary angiogram     Polyneuropathy associated with underlying disease (H)     Uncontrolled type 2 diabetes mellitus with hyperglycemia (H)     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/28/2018     BPH (benign prostatic hyperplasia)      Cancer (H)     hepatocellualr carcinoma     Cirrhosis of liver (H) 05/08/2018     Diabetes (H)      Enlarged prostate       Hypothyroidism      Impotence of organic origin 2020     Inguinal hernia     Repaired with mesh on 18     Liver lesion 2018     Liver transplanted (H) 2018     donor liver transplant     Portal vein thrombosis     on path explant     Postoperative atrial fibrillation (H) 2018     Prostate cancer (H)      TB lung, latent     Treated         CC Tamela Carballo MD  [address]  [place] [address] on close of this encounter.

## 2022-05-04 ENCOUNTER — LAB (OUTPATIENT)
Dept: LAB | Facility: CLINIC | Age: 67
End: 2022-05-04
Payer: COMMERCIAL

## 2022-05-04 ENCOUNTER — OFFICE VISIT (OUTPATIENT)
Dept: NEPHROLOGY | Facility: CLINIC | Age: 67
End: 2022-05-04
Payer: COMMERCIAL

## 2022-05-04 ENCOUNTER — OFFICE VISIT (OUTPATIENT)
Dept: DERMATOLOGY | Facility: CLINIC | Age: 67
End: 2022-05-04
Attending: INTERNAL MEDICINE
Payer: COMMERCIAL

## 2022-05-04 VITALS
OXYGEN SATURATION: 95 % | WEIGHT: 198.7 LBS | SYSTOLIC BLOOD PRESSURE: 139 MMHG | RESPIRATION RATE: 22 BRPM | HEART RATE: 66 BPM | HEIGHT: 67 IN | BODY MASS INDEX: 31.19 KG/M2 | DIASTOLIC BLOOD PRESSURE: 77 MMHG

## 2022-05-04 DIAGNOSIS — D22.9 MULTIPLE BENIGN NEVI: ICD-10-CM

## 2022-05-04 DIAGNOSIS — E03.9 ACQUIRED HYPOTHYROIDISM: ICD-10-CM

## 2022-05-04 DIAGNOSIS — Z94.4 LIVER TRANSPLANT RECIPIENT (H): ICD-10-CM

## 2022-05-04 DIAGNOSIS — L81.4 SOLAR LENTIGO: ICD-10-CM

## 2022-05-04 DIAGNOSIS — N18.2 CKD (CHRONIC KIDNEY DISEASE) STAGE 2, GFR 60-89 ML/MIN: ICD-10-CM

## 2022-05-04 DIAGNOSIS — Z94.4 LIVER TRANSPLANT RECIPIENT (H): Primary | ICD-10-CM

## 2022-05-04 DIAGNOSIS — L82.1 SEBORRHEIC KERATOSIS: ICD-10-CM

## 2022-05-04 DIAGNOSIS — R80.1 PERSISTENT PROTEINURIA: Primary | ICD-10-CM

## 2022-05-04 DIAGNOSIS — D18.01 CHERRY ANGIOMA: ICD-10-CM

## 2022-05-04 DIAGNOSIS — E55.9 VITAMIN D DEFICIENCY: ICD-10-CM

## 2022-05-04 LAB
ALBUMIN SERPL-MCNC: 3.1 G/DL (ref 3.4–5)
ALP SERPL-CCNC: 158 U/L (ref 40–150)
ALT SERPL W P-5'-P-CCNC: 27 U/L (ref 0–70)
ANION GAP SERPL CALCULATED.3IONS-SCNC: 5 MMOL/L (ref 3–14)
AST SERPL W P-5'-P-CCNC: 24 U/L (ref 0–45)
BILIRUB DIRECT SERPL-MCNC: 0.2 MG/DL (ref 0–0.2)
BILIRUB SERPL-MCNC: 0.6 MG/DL (ref 0.2–1.3)
BUN SERPL-MCNC: 22 MG/DL (ref 7–30)
CALCIUM SERPL-MCNC: 8.4 MG/DL (ref 8.5–10.1)
CHLORIDE BLD-SCNC: 110 MMOL/L (ref 94–109)
CO2 SERPL-SCNC: 26 MMOL/L (ref 20–32)
CREAT SERPL-MCNC: 0.87 MG/DL (ref 0.66–1.25)
CREAT UR-MCNC: 80 MG/DL
ERYTHROCYTE [DISTWIDTH] IN BLOOD BY AUTOMATED COUNT: 12.8 % (ref 10–15)
GFR SERPL CREATININE-BSD FRML MDRD: >90 ML/MIN/1.73M2
GLUCOSE BLD-MCNC: 138 MG/DL (ref 70–99)
HCT VFR BLD AUTO: 43.3 % (ref 40–53)
HGB BLD-MCNC: 13.9 G/DL (ref 13.3–17.7)
MCH RBC QN AUTO: 28.3 PG (ref 26.5–33)
MCHC RBC AUTO-ENTMCNC: 32.1 G/DL (ref 31.5–36.5)
MCV RBC AUTO: 88 FL (ref 78–100)
PHOSPHATE SERPL-MCNC: 2.6 MG/DL (ref 2.5–4.5)
PLATELET # BLD AUTO: 218 10E3/UL (ref 150–450)
POTASSIUM BLD-SCNC: 5.1 MMOL/L (ref 3.4–5.3)
PROT SERPL-MCNC: 6.5 G/DL (ref 6.8–8.8)
PROT UR-MCNC: 2.2 G/L
PROT/CREAT 24H UR: 2.75 G/G CR (ref 0–0.2)
RBC # BLD AUTO: 4.92 10E6/UL (ref 4.4–5.9)
SODIUM SERPL-SCNC: 141 MMOL/L (ref 133–144)
TSH SERPL DL<=0.005 MIU/L-ACNC: 1.55 MU/L (ref 0.4–4)
WBC # BLD AUTO: 6 10E3/UL (ref 4–11)

## 2022-05-04 PROCEDURE — 82248 BILIRUBIN DIRECT: CPT | Performed by: PATHOLOGY

## 2022-05-04 PROCEDURE — 80053 COMPREHEN METABOLIC PANEL: CPT | Performed by: PATHOLOGY

## 2022-05-04 PROCEDURE — 85027 COMPLETE CBC AUTOMATED: CPT | Performed by: PATHOLOGY

## 2022-05-04 PROCEDURE — 99214 OFFICE O/P EST MOD 30 MIN: CPT

## 2022-05-04 PROCEDURE — 84156 ASSAY OF PROTEIN URINE: CPT | Performed by: PATHOLOGY

## 2022-05-04 PROCEDURE — 36415 COLL VENOUS BLD VENIPUNCTURE: CPT | Performed by: PATHOLOGY

## 2022-05-04 PROCEDURE — 99213 OFFICE O/P EST LOW 20 MIN: CPT | Performed by: DERMATOLOGY

## 2022-05-04 PROCEDURE — G0463 HOSPITAL OUTPT CLINIC VISIT: HCPCS

## 2022-05-04 PROCEDURE — 84100 ASSAY OF PHOSPHORUS: CPT | Performed by: PATHOLOGY

## 2022-05-04 PROCEDURE — 84443 ASSAY THYROID STIM HORMONE: CPT | Performed by: PATHOLOGY

## 2022-05-04 RX ORDER — VITAMIN B COMPLEX
50 TABLET ORAL DAILY
Qty: 180 TABLET | Refills: 3 | Status: SHIPPED | OUTPATIENT
Start: 2022-05-04 | End: 2022-10-21

## 2022-05-04 ASSESSMENT — PAIN SCALES - GENERAL
PAINLEVEL: NO PAIN (0)
PAINLEVEL: NO PAIN (0)

## 2022-05-04 NOTE — LETTER
5/4/2022       RE: Loy Limon  [address]  [place] [address]     Dear Colleague,    Thank you for referring your patient, Loy Limon, to the [facility] [place] [facility]. Please see a copy of my visit note below.    Nephrology Clinic Visit 5/4/22    Assessment and Plan:    1. Proteinuria - Improving. UPCR 2.7 g/gCr today down from 3.0 g/gCr last visit in setting of higher dose ARB and improved b/p and glycemic control    - Current evaluation for proteinuria include: SPEP/immunofix/SFLC previously WNL, PLAR neg   - DM was diagnosed post transplant, but most recent A1c was 10% on 1/25/22   - UA 8/21 and today showing trace RBC.    - Blood pressure controlled   - Does not use NSAIDs   - On Losartan    - Creat 0.8   - Off CNI   - Reviewed with patient the gold standard for diagnosing his proteinuria would be renal bx. Reviewed the procedure with patient. He is understandably anxious/hesitant. We agreed to wait three more months to see how his glycemic control impacts his proteinuria. If no significant improvement will proceed with renal biopsy.     2. HTN- Controlled w/o edema. Clinic readings 130-139/70-77. No home readings. Left his device in [place]  Current regimen:    Amlodipine 5 mg every day   Losartan 100 mg every day   Metoprolol 75 mg bid    - Continue current regimen    - Recommended home monitoring if possible    3. DM2 - Uncontrolled. A1c 10%. Currently on Metformin 1000 mg bid, insulin and Trulicity    - A1c goal is 7%. Will check next visit. Random BS today was 138   - Per PPI    4. Liver transplant IS: MMF    5. Electrolytes - No acute concerns. K 5.1 Na 141    6. Vit D def - Vit D level 12, PTH 83, Calcium 8.4 ( albumin 3.1), Phos 2.6   - Begin Vit D 2000 U every day. Reviewed rationale for taking and patient in agreement    7. Disposition - RTC 3 months for follow up w/labs  prior      Assessment and plan was discussed with patient and he voiced his understanding and agreement.    Reason for Visit:  Proteinuria    HPI:  Mr Limon is a 67 yo male with ETOH cirrhosis/HCC s/p Liver transplant , Prostate cancer , DM2, Proteinuria, HTN, present today for proteinuria f/u. Last seen in clinic by me on 3/2/22. Amlodipine decreased to 5 mg every day, Losartan increased to 100 mg and Vit D 50 mcg every day started.    Creat 0.7 range      ROS:   No complaints  Visit conducted with Interpretor  Patient brings in his medications and list updated  Didn't take the prescribed Vit d because he felt he ate healthy enough and didn't need it  Denies CP, dyspnea, abdominal concerns  No voiding concerns  No home blood pressures  Energy level and appetite are good  Edema has resolved  Glycemic control has improved. He rarely has to use insulin    Chronic Health Problems:    ETOH cirrhosis/HCC s/p Liver transplant 2018  Prostate cancer   DM2  Proteinuria  HTN  Immunosuppressed state  Afib with RVR  Hypothyroidism  Tobacco use d/o  Alcohol use d/o ( in remission)  TB ( latent, treated)  HLD    Family Hx:   Family History   Problem Relation Age of Onset     Memory loss Mother      Liver Disease Brother      Skin Cancer No family hx of      Melanoma No family hx of      Personal Hx:   Social History     Tobacco Use     Smoking status: Former Smoker     Packs/day: 0.03     Years: 20.00     Pack years: 0.60     Types: Cigarettes, Cigars     Start date: 1970     Quit date: 3/14/2018     Years since quittin.1     Smokeless tobacco: Never Used     Tobacco comment: Quit smoking 2018, one cigarette after dinner   Substance Use Topics     Alcohol use: No     Comment: Quit drinking 2018       Allergies:  No Known Allergies    Medications:  Current Outpatient Medications   Medication Sig     amLODIPine (NORVASC) 5 MG tablet Take 1 tablet (5 mg) by mouth daily     blood glucose (NO BRAND  SPECIFIED) lancets standard Use to test blood sugar 4 times daily or as directed.     blood glucose (ONETOUCH VERIO IQ) test strip Use to test blood sugar 4 times daily or as directed.     blood glucose monitoring (NO BRAND SPECIFIED) meter device kit Use to test blood sugar 4 times daily or as directed. Please let the pt know when it is ready to .     dulaglutide (TRULICITY) 0.75 MG/0.5ML pen Inject 1.5 mg Subcutaneous every 7 days     glucose 40 % (400 mg/mL) gel Take 15-30 g by mouth every 15 minutes as needed for low blood sugar     insulin glargine (LANTUS PEN) 100 UNIT/ML pen Take 15 units in the morning and 15 units in the evening     insulin pen needle (BD KIRK U/F) 32G X 4 MM miscellaneous Use 1 daily.     Lancets (ONETOUCH DELICA PLUS [id number]) MISC U TO TEST QID OR UTD     levothyroxine (SYNTHROID/LEVOTHROID) 125 MCG tablet Take 1 tablet (125 mcg) by mouth daily     losartan (COZAAR) 50 MG tablet Take 2 tablets (100 mg) by mouth daily     metFORMIN (GLUCOPHAGE-XR) 500 MG 24 hr tablet Take 2 tablets (1,000 mg) by mouth 2 times daily (with meals)     Metoprolol Tartrate 75 MG TABS Take 1 tablet by mouth 2 times daily     mycophenolate (GENERIC EQUIVALENT) 250 MG capsule Take 3 capsules (750 mg) by mouth 2 times daily     nitroGLYcerin (NITROSTAT) 0.4 MG sublingual tablet For chest pain place 1 tablet under the tongue every 5 minutes for 3 doses. If symptoms persist 5 minutes after 1st dose call 911.     predniSONE (DELTASONE) 5 MG tablet Take 1 tablet (5 mg) by mouth daily     Sharps Container MISC 1 each daily     ticagrelor (BRILINTA) 90 MG tablet Take 1 tablet (90 mg) by mouth 2 times daily     ursodiol (ACTIGALL) 250 MG tablet Take 1 tablet (250 mg) by mouth 2 times daily     Vitamin D3 (CHOLECALCIFEROL) 25 mcg (1000 units) tablet Take 2 tablets (50 mcg) by mouth daily     No current facility-administered medications for this visit.      Vitals:  /77   Pulse 66   Resp 22   Ht 1.702 m  "(5' 7.01\")   Wt 90.1 kg (198 lb 11.2 oz)   SpO2 95%   BMI 31.11 kg/m      Exam:  GEN: Pleasant male in NAD  CARDIAC: RRR  LUNGS: CTA  ABDOMEN: Soft, Obese  EXT: no edema  NEURO: A/O    LABS:   CMP  Recent Labs   Lab Test 05/04/22  0920 03/23/22  0720 03/07/22  1331 03/02/22  1442 07/21/21  1804 07/07/21  1059 04/23/21  0755 03/24/21  0812 02/01/21  0723    142 139 138   < > 138 138 140 136   POTASSIUM 5.1 4.2 4.8 4.3   < > 4.7 4.9 4.5 4.5   CHLORIDE 110* 108 104 107   < > 106 108 106 104   CO2 26 27 27 27   < > 25 26 26 28   ANIONGAP 5 7 8 4   < > 6 4 7 5   * 148* 108* 112*   < > 202* 200* 241* 296*   BUN 22 17 18 14   < > 26 16 24 23   CR 0.87 0.91 0.90 0.75   < > 0.79 0.84 0.86 0.75   GFRESTIMATED >90 >90 >90 >90   < > >90 >90 89 >90   GFRESTBLACK  --   --   --   --   --  >90 >90 >90 >90   YELENA 8.4* 8.2* 8.8 8.7   < > 8.3* 8.3* 8.9 8.9    < > = values in this interval not displayed.     Recent Labs   Lab Test 05/04/22  0920 03/23/22  0720 03/07/22  1331 01/25/22  0851   BILITOTAL 0.6 0.6 0.9 0.8   ALKPHOS 158* 223* 242* 326*   ALT 27 29 32 49   AST 24 19 19 23     CBC  Recent Labs   Lab Test 05/04/22  0920 03/23/22  0720 03/07/22  1331 03/02/22  1442   HGB 13.9 13.9 15.0 14.0   WBC 6.0 6.3 7.2 5.8   RBC 4.92 4.96 5.35 5.02   HCT 43.3 42.3 46.1 42.8   MCV 88 85 86 85   MCH 28.3 28.0 28.0 27.9   MCHC 32.1 32.9 32.5 32.7   RDW 12.8 12.6 12.5 12.3    234 247 235     URINE STUDIES  Recent Labs   Lab Test 03/02/22  1445 08/30/21  0943 07/21/21  1806 06/09/21  0945   COLOR Yellow Yellow Yellow Yellow   APPEARANCE Clear Clear Clear Clear   URINEGLC Negative Negative 50 * 150*   URINEBILI Negative Negative Negative Negative   URINEKETONE Negative Negative Negative Negative   SG 1.020 1.017 1.018 1.018   UBLD Trace* Small* Negative Negative   URINEPH 6.0 5.0 5.0 5.0   PROTEIN >2000* 100 * >499* 100*   NITRITE Negative Negative Negative Negative   LEUKEST Negative Negative Negative Negative   RBCU 2 3* " 2 1   WBCU 1 1 1 1     Recent Labs   Lab Test 05/04/22  0930 03/02/22  1445 08/30/21  0943 07/21/21  1806   UTPG 2.75* 3.04* 2.83* 1.40*     PTH  Recent Labs   Lab Test 03/02/22  1442   PTHI 83*     IRON STUDIES  Recent Labs   Lab Test 07/19/18  1132   IRON 141   *   IRONSAT 68*       Jacqueline Juarez, NP

## 2022-05-04 NOTE — PROGRESS NOTES
Nephrology Clinic Visit 5/4/22    Assessment and Plan:    1. Proteinuria - Improving. UPCR 2.7 g/gCr today down from 3.0 g/gCr last visit in setting of higher dose ARB and improved b/p and glycemic control    - Current evaluation for proteinuria include: SPEP/immunofix/SFLC previously WNL, PLAR neg   - DM was diagnosed post transplant, but most recent A1c was 10% on 1/25/22   - UA 8/21 and today showing trace RBC.    - Blood pressure controlled   - Does not use NSAIDs   - On Losartan    - Creat 0.8   - Off CNI   - Reviewed with patient the gold standard for diagnosing his proteinuria would be renal bx. Reviewed the procedure with patient. He is understandably anxious/hesitant. We agreed to wait three more months to see how his glycemic control impacts his proteinuria. If no significant improvement will proceed with renal biopsy.     2. HTN- Controlled w/o edema. Clinic readings 130-139/70-77. No home readings. Left his device in [place]  Current regimen:    Amlodipine 5 mg every day   Losartan 100 mg every day   Metoprolol 75 mg bid    - Continue current regimen    - Recommended home monitoring if possible    3. DM2 - Uncontrolled. A1c 10%. Currently on Metformin 1000 mg bid, insulin and Trulicity    - A1c goal is 7%. Will check next visit. Random BS today was 138   - Per PPI    4. Liver transplant IS: MMF    5. Electrolytes - No acute concerns. K 5.1 Na 141    6. Vit D def - Vit D level 12, PTH 83, Calcium 8.4 ( albumin 3.1), Phos 2.6   - Begin Vit D 2000 U every day. Reviewed rationale for taking and patient in agreement    7. Disposition - RTC 3 months for follow up w/labs prior      Assessment and plan was discussed with patient and he voiced his understanding and agreement.    Reason for Visit:  Proteinuria    HPI:  Mr Limon is a 65 yo male with ETOH cirrhosis/HCC s/p Liver transplant 2018, Prostate cancer 4/19, DM2, Proteinuria, HTN, present today for proteinuria f/u. Last seen in clinic by me on 3/2/22.  Amlodipine decreased to 5 mg every day, Losartan increased to 100 mg and Vit D 50 mcg every day started.    Creat 0.7 range      ROS:   No complaints  Visit conducted with Interpretor  Patient brings in his medications and list updated  Didn't take the prescribed Vit d because he felt he ate healthy enough and didn't need it  Denies CP, dyspnea, abdominal concerns  No voiding concerns  No home blood pressures  Energy level and appetite are good  Edema has resolved  Glycemic control has improved. He rarely has to use insulin    Chronic Health Problems:    ETOH cirrhosis/HCC s/p Liver transplant   Prostate cancer   DM2  Proteinuria  HTN  Immunosuppressed state  Afib with RVR  Hypothyroidism  Tobacco use d/o  Alcohol use d/o ( in remission)  TB ( latent, treated)  HLD    Family Hx:   Family History   Problem Relation Age of Onset     Memory loss Mother      Liver Disease Brother      Skin Cancer No family hx of      Melanoma No family hx of      Personal Hx:   Social History     Tobacco Use     Smoking status: Former Smoker     Packs/day: 0.03     Years: 20.00     Pack years: 0.60     Types: Cigarettes, Cigars     Start date: 1970     Quit date: 3/14/2018     Years since quittin.1     Smokeless tobacco: Never Used     Tobacco comment: Quit smoking 2018, one cigarette after dinner   Substance Use Topics     Alcohol use: No     Comment: Quit drinking 2018       Allergies:  No Known Allergies    Medications:  Current Outpatient Medications   Medication Sig     amLODIPine (NORVASC) 5 MG tablet Take 1 tablet (5 mg) by mouth daily     blood glucose (NO BRAND SPECIFIED) lancets standard Use to test blood sugar 4 times daily or as directed.     blood glucose (ONETOUCH VERIO IQ) test strip Use to test blood sugar 4 times daily or as directed.     blood glucose monitoring (NO BRAND SPECIFIED) meter device kit Use to test blood sugar 4 times daily or as directed. Please let the pt know when it is ready to  ".     dulaglutide (TRULICITY) 0.75 MG/0.5ML pen Inject 1.5 mg Subcutaneous every 7 days     glucose 40 % (400 mg/mL) gel Take 15-30 g by mouth every 15 minutes as needed for low blood sugar     insulin glargine (LANTUS PEN) 100 UNIT/ML pen Take 15 units in the morning and 15 units in the evening     insulin pen needle (BD KIRK U/F) 32G X 4 MM miscellaneous Use 1 daily.     Lancets (ONETOUCH DELICA PLUS [id number]) MISC U TO TEST QID OR UTD     levothyroxine (SYNTHROID/LEVOTHROID) 125 MCG tablet Take 1 tablet (125 mcg) by mouth daily     losartan (COZAAR) 50 MG tablet Take 2 tablets (100 mg) by mouth daily     metFORMIN (GLUCOPHAGE-XR) 500 MG 24 hr tablet Take 2 tablets (1,000 mg) by mouth 2 times daily (with meals)     Metoprolol Tartrate 75 MG TABS Take 1 tablet by mouth 2 times daily     mycophenolate (GENERIC EQUIVALENT) 250 MG capsule Take 3 capsules (750 mg) by mouth 2 times daily     nitroGLYcerin (NITROSTAT) 0.4 MG sublingual tablet For chest pain place 1 tablet under the tongue every 5 minutes for 3 doses. If symptoms persist 5 minutes after 1st dose call 911.     predniSONE (DELTASONE) 5 MG tablet Take 1 tablet (5 mg) by mouth daily     Sharps Container MISC 1 each daily     ticagrelor (BRILINTA) 90 MG tablet Take 1 tablet (90 mg) by mouth 2 times daily     ursodiol (ACTIGALL) 250 MG tablet Take 1 tablet (250 mg) by mouth 2 times daily     Vitamin D3 (CHOLECALCIFEROL) 25 mcg (1000 units) tablet Take 2 tablets (50 mcg) by mouth daily     No current facility-administered medications for this visit.      Vitals:  /77   Pulse 66   Resp 22   Ht 1.702 m (5' 7.01\")   Wt 90.1 kg (198 lb 11.2 oz)   SpO2 95%   BMI 31.11 kg/m      Exam:  GEN: Pleasant male in NAD  CARDIAC: RRR  LUNGS: CTA  ABDOMEN: Soft, Obese  EXT: no edema  NEURO: A/O    LABS:   CMP  Recent Labs   Lab Test 05/04/22  0920 03/23/22  0720 03/07/22  1331 03/02/22  1442 07/21/21  1804 07/07/21  1059 04/23/21  0755 03/24/21  0812 " 02/01/21  0723    142 139 138   < > 138 138 140 136   POTASSIUM 5.1 4.2 4.8 4.3   < > 4.7 4.9 4.5 4.5   CHLORIDE 110* 108 104 107   < > 106 108 106 104   CO2 26 27 27 27   < > 25 26 26 28   ANIONGAP 5 7 8 4   < > 6 4 7 5   * 148* 108* 112*   < > 202* 200* 241* 296*   BUN 22 17 18 14   < > 26 16 24 23   CR 0.87 0.91 0.90 0.75   < > 0.79 0.84 0.86 0.75   GFRESTIMATED >90 >90 >90 >90   < > >90 >90 89 >90   GFRESTBLACK  --   --   --   --   --  >90 >90 >90 >90   YELENA 8.4* 8.2* 8.8 8.7   < > 8.3* 8.3* 8.9 8.9    < > = values in this interval not displayed.     Recent Labs   Lab Test 05/04/22  0920 03/23/22  0720 03/07/22  1331 01/25/22  0851   BILITOTAL 0.6 0.6 0.9 0.8   ALKPHOS 158* 223* 242* 326*   ALT 27 29 32 49   AST 24 19 19 23     CBC  Recent Labs   Lab Test 05/04/22  0920 03/23/22  0720 03/07/22  1331 03/02/22  1442   HGB 13.9 13.9 15.0 14.0   WBC 6.0 6.3 7.2 5.8   RBC 4.92 4.96 5.35 5.02   HCT 43.3 42.3 46.1 42.8   MCV 88 85 86 85   MCH 28.3 28.0 28.0 27.9   MCHC 32.1 32.9 32.5 32.7   RDW 12.8 12.6 12.5 12.3    234 247 235     URINE STUDIES  Recent Labs   Lab Test 03/02/22  1445 08/30/21  0943 07/21/21  1806 06/09/21  0945   COLOR Yellow Yellow Yellow Yellow   APPEARANCE Clear Clear Clear Clear   URINEGLC Negative Negative 50 * 150*   URINEBILI Negative Negative Negative Negative   URINEKETONE Negative Negative Negative Negative   SG 1.020 1.017 1.018 1.018   UBLD Trace* Small* Negative Negative   URINEPH 6.0 5.0 5.0 5.0   PROTEIN >2000* 100 * >499* 100*   NITRITE Negative Negative Negative Negative   LEUKEST Negative Negative Negative Negative   RBCU 2 3* 2 1   WBCU 1 1 1 1     Recent Labs   Lab Test 05/04/22  0930 03/02/22  1445 08/30/21  0943 07/21/21  1806   UTPG 2.75* 3.04* 2.83* 1.40*     PTH  Recent Labs   Lab Test 03/02/22  1442   PTHI 83*     IRON STUDIES  Recent Labs   Lab Test 07/19/18  1132   IRON 141   *   IRONSAT 68*       Jacqueline Juarez, NP

## 2022-05-04 NOTE — PATIENT INSTRUCTIONS
Patient Education     Checking for Skin Cancer  You can find cancer early by checking your skin each month. There are 3 kinds of skin cancer. They are melanoma, basal cell carcinoma, and squamous cell carcinoma. Doing monthly skin checks is the best way to find new marks or skin changes. Follow the instructions below for checking your skin.   The ABCDEs of checking moles for melanoma   Check your moles or growths for signs of melanoma using ABCDE:   Asymmetry: the sides of the mole or growth don t match  Border: the edges are ragged, notched, or blurred  Color: the color within the mole or growth varies  Diameter: the mole or growth is larger than 6 mm (size of a pencil eraser)  Evolving: the size, shape, or color of the mole or growth is changing (evolving is not shown in the images below)    Checking for other types of skin cancer  Basal cell carcinoma or squamous cell carcinoma have symptoms such as:     A spot or mole that looks different from all other marks on your skin  Changes in how an area feels, such as itching, tenderness, or pain  Changes in the skin's surface, such as oozing, bleeding, or scaliness  A sore that does not heal  New swelling or redness beyond the border of a mole    Who s at risk?  Anyone can get skin cancer. But you are at greater risk if you have:   Fair skin, light-colored hair, or light-colored eyes  Many moles or abnormal moles on your skin  A history of sunburns from sunlight or tanning beds  A family history of skin cancer  A history of exposure to radiation or chemicals  A weakened immune system  If you have had skin cancer in the past, you are at risk for recurring skin cancer.   How to check your skin  Do your monthly skin checkups in front of a full-length mirror. Check all parts of your body, including your:   Head (ears, face, neck, and scalp)  Torso (front, back, and sides)  Arms (tops, undersides, upper, and lower armpits)  Hands (palms, backs, and fingers, including  under the nails)  Buttocks and genitals  Legs (front, back, and sides)  Feet (tops, soles, toes, including under the nails, and between toes)  If you have a lot of moles, take digital photos of them each month. Make sure to take photos both up close and from a distance. These can help you see if any moles change over time.   Most skin changes are not cancer. But if you see any changes in your skin, call your doctor right away. Only he or she can diagnose a problem. If you have skin cancer, seeing your doctor can be the first step toward getting the treatment that could save your life.   [employer] last reviewed this educational content on 4/1/2019 2000-2020 The "[employer]. [address] [place] [address], [place] [address]. All rights reserved. This information is not intended as a substitute for professional medical care. Always follow your healthcare professional's instructions.       When should I call my doctor?  If you are worsening or not improving, please, contact us or seek urgent care as noted below.     Who should I call with questions (adults)?  [facility] (adult and pediatric): [phone]  [facility] (adult): [phone]  For urgent needs outside of business hours call the [facility] at [phone] and ask for the dermatology resident on call to be paged  If this is a medical emergency and you are unable to reach an ER, Call [phone]    Who should I call with questions (pediatric)?  [facility]- Pediatric Dermatology  Dr. Alejandra Kapadia, Dr. Tata Go, Dr. Allison Dasilva, BASHIR Mayorga, Dr. Ольга Montana, Dr. Khloe Wilde & Dr. Rolly Strong  Non-urgent nurse triage line; [phone]- Adilia and Damaris ZAMARRIPA Care Coordinatorjuanita Cordova (/Complex ) [phone]    If you need a prescription refill, please contact your pharmacy. Refills are approved or denied by our  Physicians during normal business hours, Monday through Fridays  Per office policy, refills will not be granted if you have not been seen within the past year (or sooner depending on your child's condition)    Scheduling Information:  Pediatric Appointment Scheduling and Call Center [phone]  Radiology Scheduling- [phone]  Sedation Unit Scheduling- [phone]  [facility] Scheduling- General [phone]; Pediatric Dermatology [phone]  Main  Services: [phone]  Irish: [phone]  Vietnamese: [phone]  Hmong/Tristanian/Yoruba: [phone]  Preadmission Nursing Department Fax Number: [phone] (Fax all pre-operative paperwork to this number)    For urgent matters arising during evenings, weekends, or holidays that cannot wait for normal business hours please call [phone] and ask for the dermatology resident on call to be paged.

## 2022-05-04 NOTE — NURSING NOTE
"Chief Complaint   Patient presents with     RECHECK     CKD     Vital signs:      BP: 139/77 Pulse: 66   Resp: 22 SpO2: 95 %     Height: 170.2 cm (5' 7.01\") Weight: 90.1 kg (198 lb 11.2 oz)  Estimated body mass index is 31.11 kg/m  as calculated from the following:    Height as of this encounter: 1.702 m (5' 7.01\").    Weight as of this encounter: 90.1 kg (198 lb 11.2 oz).        Joyce Ruvalcaba, [facility]  5/4/2022 12:21 PM      "

## 2022-05-04 NOTE — NURSING NOTE
Dermatology Rooming Note    Loy Limon's goals for this visit include:   Chief Complaint   Patient presents with     Skin Check     Foot feels better, states he is worried about lab time     Cristela Pina, Visit Facilitator

## 2022-05-04 NOTE — LETTER
5/4/2022       RE: Loy Limon  [address]  [place] [address]     Dear Colleague,    Thank you for referring your patient, Loy Limon, to the [facility] [place] [facility]. Please see a copy of my visit note below.    [facility] Dermatology Note  Encounter Date: May 4, 2022  Office Visit     Dermatology Problem List:  1. s/p Liver transplant 12/2018 2/2 EtOH cirrhosis c/b HCC, on tacrolimus  2. Skin sensitivity/peripheral neuropathy post transplant in setting of new diagnosis of diabetes (A1c 8.6%; up from 5.8% pre-transplant)  3. Hyperhidrosis of the soles of feet  - Current Tx: Aluminum chloride 20% solution     ____________________________________________    Assessment & Plan:    # History of liver transplantation on tacrolimus.   - Discussed increased risk for skin cancer in organ transplant recipients due to iatrogenic immunosuppression.   - Sun precaution was advised including the use of sun screens of SPF 30 or higher, sun protective clothing, and avoidance of tanning beds.   - Continue annual skin examinations.     # Benign lesions: Multiple benign nevi, solar lentigos, seborrheic keratoses, cherry angiomas. Explained to patient benign nature of lesion. No treatment is necessary at this time unless the lesion changes or becomes symptomatic.   - ABCDs of melanoma were discussed and self skin checks were advised.  - Sun precaution was advised including the use of sun screens of SPF 30 or higher, sun protective clothing, and avoidance of tanning beds.     Procedures Performed:   None    Follow-up: 2 year(s) in-person, or earlier for new or changing lesions    Staff and Scribe:     Scribe Disclosure:  I, Ramos Wall, am serving as a scribe to document services personally performed by Kevin Gonzalez MD based on data collection and the provider's statements to me.      Provider Disclosure:   The documentation recorded by the scribe accurately reflects the services I personally performed and the decisions made by me.    Kevin Gonzalez MD    Department of Dermatology  [facility]: Phone: [phone], Fax:[phone]  [facility]: Phone: [phone] Fax: [phone]  ____________________________________________    CC: Skin Check (Foot feels better, states he is worried about lab time)    HPI:  Mr. Loy Limon is a(n) 66 year old male who presents today as a return patient for FBSE. Last seen by me on 9/24/2020, at which time no lesions of concern were noted.    Today, patient reports that his sweating has not been bothersome. He has not been using aluminum chloride.  Denies particular spots of concern on his skin today. He has been using sunscreen. Denies personal or family history of skin cancer. No history of indoor tanning bed use or outdoor work.    Patient is otherwise feeling well, without additional skin concerns.    Labs Reviewed:  N/A    Physical Exam:  Vitals: There were no vitals taken for this visit.  SKIN: Full skin, which includes the head/face, both arms, chest, back, abdomen,both legs, genitalia and/or groin buttocks, digits and/or nails, was examined.  - There are dome shaped bright red papules on the trunk and extremities.   - Multiple regular brown pigmented macules and papules are identified on the trunk and extremities.   - Scattered brown macules on sun exposed areas.  - There are waxy stuck on tan to brown papules on the trunk and extremities.  - No other lesions of concern on areas examined.     Medications:  Current Outpatient Medications   Medication     amLODIPine (NORVASC) 5 MG tablet     atorvastatin (LIPITOR) 40 MG tablet     blood glucose (NO BRAND SPECIFIED) lancets standard     blood glucose (ONETOUCH VERIO IQ) test  strip     blood glucose monitoring (NO BRAND SPECIFIED) meter device kit     dulaglutide (TRULICITY) 0.75 MG/0.5ML pen     glucose 40 % (400 mg/mL) gel     insulin glargine (LANTUS PEN) 100 UNIT/ML pen     insulin glargine (LANTUS PEN) 100 UNIT/ML pen     insulin pen needle (BD KIRK U/F) 32G X 4 MM miscellaneous     Lancets (ONETOUCH DELICA PLUS [id number]) MISC     levothyroxine (SYNTHROID/LEVOTHROID) 125 MCG tablet     levothyroxine (SYNTHROID/LEVOTHROID) 125 MCG tablet     losartan (COZAAR) 50 MG tablet     metFORMIN (GLUCOPHAGE-XR) 500 MG 24 hr tablet     Metoprolol Tartrate 75 MG TABS     mycophenolate (GENERIC EQUIVALENT) 250 MG capsule     nitroGLYcerin (NITROSTAT) 0.4 MG sublingual tablet     predniSONE (DELTASONE) 5 MG tablet     Sharps Container MISC     ticagrelor (BRILINTA) 90 MG tablet     ursodiol (ACTIGALL) 250 MG tablet     Vitamin D3 (CHOLECALCIFEROL) 25 mcg (1000 units) tablet     No current facility-administered medications for this visit.      Past Medical History:   Patient Active Problem List   Diagnosis     Cirrhosis of liver (H)     Liver lesion     Liver transplant recipient (H)     Immunosuppressed status (H)     Hypervolemia     Atrial fibrillation with rapid ventricular response (H)     Hematuria     Bilateral wheezing     Hypoxia     Hyperglycemia     Pre-diabetes     Essential hypertension     Constipation     Biliary stricture of transplanted liver (H)     Drug-induced diabetes mellitus (H)     Muscle tension dysphonia     Laennec's cirrhosis (H)     Elevated ferritin     Inguinal hernia of right side with obstruction and without gangrene     Right sided abdominal pain     Tobacco use disorder     Prostate cancer (H)     Diabetes mellitus, type 2 (H)     Persistent proteinuria     Chronic kidney disease, stage 2 (mild)     Hypothyroidism, unspecified type     Status post coronary angiogram     Polyneuropathy associated with underlying disease (H)     Uncontrolled type 2 diabetes  mellitus with hyperglycemia (H)     Past Medical History:   Diagnosis Date     Anemia      Biliary stricture of transplanted liver (H) 2018     BPH (benign prostatic hyperplasia)      Cancer (H)     hepatocellualr carcinoma     Cirrhosis of liver (H) 2018     Diabetes (H)      Enlarged prostate      Hypothyroidism      Impotence of organic origin 2020     Inguinal hernia     Repaired with mesh on 18     Liver lesion 2018     Liver transplanted (H) 2018     donor liver transplant     Portal vein thrombosis     on path explant     Postoperative atrial fibrillation (H) 2018     Prostate cancer (H)      TB lung, latent     Treated         CC Tamela Carballo MD  [address]  [place] [address] on close of this encounter.

## 2022-07-08 DIAGNOSIS — Z94.4 LIVER TRANSPLANTED (H): ICD-10-CM

## 2022-07-08 RX ORDER — MYCOPHENOLATE MOFETIL 250 MG/1
CAPSULE ORAL
Qty: 540 CAPSULE | Refills: 3 | Status: SHIPPED | OUTPATIENT
Start: 2022-07-08 | End: 2023-07-07

## 2022-07-18 DIAGNOSIS — Z94.4 LIVER REPLACED BY TRANSPLANT (H): Primary | ICD-10-CM

## 2022-07-18 DIAGNOSIS — C22.0 LIVER CELL CARCINOMA (H): ICD-10-CM

## 2022-07-18 DIAGNOSIS — Z13.220 LIPID SCREENING: ICD-10-CM

## 2022-07-21 ENCOUNTER — TRANSFERRED RECORDS (OUTPATIENT)
Dept: HEALTH INFORMATION MANAGEMENT | Facility: CLINIC | Age: 67
End: 2022-07-21

## 2022-07-27 ENCOUNTER — LAB (OUTPATIENT)
Dept: LAB | Facility: CLINIC | Age: 67
End: 2022-07-27
Payer: COMMERCIAL

## 2022-07-27 ENCOUNTER — OFFICE VISIT (OUTPATIENT)
Dept: FAMILY MEDICINE | Facility: CLINIC | Age: 67
End: 2022-07-27

## 2022-07-27 VITALS
DIASTOLIC BLOOD PRESSURE: 74 MMHG | HEART RATE: 61 BPM | OXYGEN SATURATION: 97 % | SYSTOLIC BLOOD PRESSURE: 133 MMHG | WEIGHT: 197 LBS | BODY MASS INDEX: 30.85 KG/M2

## 2022-07-27 DIAGNOSIS — I10 ESSENTIAL HYPERTENSION: Primary | ICD-10-CM

## 2022-07-27 DIAGNOSIS — E11.9 DM TYPE 2, GOAL HBA1C 7%-8% (H): ICD-10-CM

## 2022-07-27 DIAGNOSIS — Z94.4 LIVER REPLACED BY TRANSPLANT (H): ICD-10-CM

## 2022-07-27 DIAGNOSIS — I25.118 CORONARY ARTERY DISEASE OF NATIVE ARTERY OF NATIVE HEART WITH STABLE ANGINA PECTORIS (H): ICD-10-CM

## 2022-07-27 LAB
ALBUMIN SERPL-MCNC: 3.1 G/DL (ref 3.4–5)
ALP SERPL-CCNC: 125 U/L (ref 40–150)
ALT SERPL W P-5'-P-CCNC: 27 U/L (ref 0–70)
ANION GAP SERPL CALCULATED.3IONS-SCNC: 6 MMOL/L (ref 3–14)
AST SERPL W P-5'-P-CCNC: 17 U/L (ref 0–45)
BILIRUB DIRECT SERPL-MCNC: 0.2 MG/DL (ref 0–0.2)
BILIRUB SERPL-MCNC: 0.9 MG/DL (ref 0.2–1.3)
BUN SERPL-MCNC: 19 MG/DL (ref 7–30)
CALCIUM SERPL-MCNC: 8.5 MG/DL (ref 8.5–10.1)
CHLORIDE BLD-SCNC: 106 MMOL/L (ref 94–109)
CO2 SERPL-SCNC: 26 MMOL/L (ref 20–32)
CREAT SERPL-MCNC: 0.79 MG/DL (ref 0.66–1.25)
ERYTHROCYTE [DISTWIDTH] IN BLOOD BY AUTOMATED COUNT: 12.6 % (ref 10–15)
GFR SERPL CREATININE-BSD FRML MDRD: >90 ML/MIN/1.73M2
GLUCOSE BLD-MCNC: 128 MG/DL (ref 70–99)
HBA1C MFR BLD: 6.7 % (ref 0–5.6)
HCT VFR BLD AUTO: 43.8 % (ref 40–53)
HGB BLD-MCNC: 14.6 G/DL (ref 13.3–17.7)
MCH RBC QN AUTO: 29.2 PG (ref 26.5–33)
MCHC RBC AUTO-ENTMCNC: 33.3 G/DL (ref 31.5–36.5)
MCV RBC AUTO: 88 FL (ref 78–100)
PLATELET # BLD AUTO: 209 10E3/UL (ref 150–450)
POTASSIUM BLD-SCNC: 4.5 MMOL/L (ref 3.4–5.3)
PROT SERPL-MCNC: 6.6 G/DL (ref 6.8–8.8)
RBC # BLD AUTO: 5 10E6/UL (ref 4.4–5.9)
SODIUM SERPL-SCNC: 138 MMOL/L (ref 133–144)
T4 FREE SERPL-MCNC: 0.85 NG/DL (ref 0.76–1.46)
TSH SERPL DL<=0.005 MIU/L-ACNC: 11.01 MU/L (ref 0.4–4)
WBC # BLD AUTO: 7.1 10E3/UL (ref 4–11)

## 2022-07-27 PROCEDURE — 36415 COLL VENOUS BLD VENIPUNCTURE: CPT | Performed by: PATHOLOGY

## 2022-07-27 PROCEDURE — 82248 BILIRUBIN DIRECT: CPT | Performed by: PATHOLOGY

## 2022-07-27 PROCEDURE — 85027 COMPLETE CBC AUTOMATED: CPT | Performed by: PATHOLOGY

## 2022-07-27 PROCEDURE — 84439 ASSAY OF FREE THYROXINE: CPT | Performed by: PATHOLOGY

## 2022-07-27 PROCEDURE — 84443 ASSAY THYROID STIM HORMONE: CPT | Performed by: PATHOLOGY

## 2022-07-27 PROCEDURE — 99214 OFFICE O/P EST MOD 30 MIN: CPT | Performed by: FAMILY MEDICINE

## 2022-07-27 PROCEDURE — 80053 COMPREHEN METABOLIC PANEL: CPT | Performed by: PATHOLOGY

## 2022-07-27 PROCEDURE — 83036 HEMOGLOBIN GLYCOSYLATED A1C: CPT | Performed by: PATHOLOGY

## 2022-07-27 RX ORDER — ATORVASTATIN CALCIUM 40 MG/1
40 TABLET, FILM COATED ORAL DAILY
Qty: 90 TABLET | Refills: 3 | Status: SHIPPED | OUTPATIENT
Start: 2022-07-27 | End: 2023-07-12

## 2022-07-27 NOTE — PROGRESS NOTES
"    Assessment & Plan     Essential hypertension  Do at home, bring lists in   - Home Blood Pressure Monitor Order for DME - ONLY FOR DME    DM type 2, goal HbA1c 7%-8% (H)  Due  - atorvastatin (LIPITOR) 40 MG tablet; Take 1 tablet (40 mg) by mouth daily  - Adult Endocrinology  Referral; Future  - Adult Eye Referral; Future  - Hemoglobin A1c; Future  - TSH with free T4 reflex; Future    CAD: no angina, resume lipitor    See me in fall if off brilinta order colonoscopy      33 minutes spent on the date of the encounter doing chart review, history and exam, documentation and further activities per the note    BMI:   Estimated body mass index is 30.85 kg/m  as calculated from the following:    Height as of 5/4/22: 1.702 m (5' 7.01\").    Weight as of this encounter: 89.4 kg (197 lb).     Return in about 3 months (around 10/27/2022).    Jaswinder Anne MD  [facility] is a 67 year old, presenting for the following health issues:  Recheck Medication (Pt here to follow up)      HPI Here in f/u w/ ipad interp  Missed Endo visit, redo DM labs we agree  Shots utd  Home bp cuff missing will re rx one  Due for colonoscoy, likely can stop brilinta in autumn we will meet then to discuss order one  He is not sure if on lipitor, rvwd w/ CAD he should be, will re rx  No cardio sx; notes L anterior CP for less than one minute, not exertional, less than daily, no assoc sob dizzy sweat non radiate, several months. No trigger, just goes away in under a minute, I double checked that  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/28/2018     BPH (benign prostatic hyperplasia)      Cancer (H)     hepatocellualr carcinoma     Cirrhosis of liver (H) 05/08/2018     Diabetes (H)      Enlarged prostate      Hypothyroidism      Impotence of organic origin 09/25/2020     Inguinal hernia     Repaired with mesh on " 18     Liver lesion 2018     Liver transplanted (H) 2018     donor liver transplant     Portal vein thrombosis     on path explant     Postoperative atrial fibrillation (H) 2018     Prostate cancer (H)      TB lung, latent     Treated      Past Surgical History:   Procedure Laterality Date     APPENDECTOMY       COLONOSCOPY           CV CORONARY ANGIOGRAM N/A 10/13/2021    Procedure: CV CORONARY ANGIOGRAM;  Surgeon: Yaya Hampton MD;  [facility]     CV CORONARY LITHOTRIPSY PCI N/A 10/13/2021    Procedure: CV Coronary Lithotripsy PCI;  Surgeon: Yaya Hampton MD;  Location:  [facility] CARDI[facility] CATH LAB     CV INSTANTANEOUS WAVE-FREE RATIO N/A 10/13/2021    Procedure: Instantaneous Wave-Free Ratio;  Surgeon: Yaya Hampton MD;  Location:  HEART CARDIAC CATH LAB     CV INTRAVASULAR ULTRASOUND N/A 10/13/2021    Procedure: Intravascular Ultrasound;  Surgeon: Yaya Hampton MD;  Location:  HEART CARDIAC CATH LAB     CV PCI STENT DRUG ELUTING N/A 10/13/2021    Procedure: Percutaneous Coronary Intervention Stent Drug Eluting;  Surgeon: Yaya Hampton MD;  Location:  HEART CARDIAC CATH LAB     DAVINCI PROSTATECTOMY N/A 1/3/2020    Procedure: Robot-assisted laparoscopic radical prostatectomy, Bladder biopsy;  Surgeon: Kenrick Casiano MD;  [facility]     ENDOSCOPIC RETROGRADE CHOLANGIOPANCREATOGRAM N/A 2018    Procedure: ENDOSCOPIC RETROGRADE CHOLANGIOPANCREATOGRAM, with Billary Sphincterotomy and Billary Stent Placement;  Surgeon: Omero Lawler MD;  [facility]     ENDOSCOPIC RETROGRADE CHOLANGIOPANCREATOGRAM  2019    Procedure: COMBINED ENDOSCOPIC RETROGRADE CHOLANGIOPANCREATOGRAPHY, Bile Duct Stent Exchange;  Surgeon: Omero Lawler MD;  [facility]     ENDOSCOPIC RETROGRADE CHOLANGIOPANCREATOGRAM N/A 2019    Procedure:  "ENDOSCOPIC RETROGRADE CHOLANGIOPANCREATOGRAPHY, WITH BILIARY STENT REMOVAL AND STONE EXTRACTION;  Surgeon: Omero Lawler MD;  [facility]     HERNIORRHAPHY INGUINAL  2018    Procedure: Herniorrhaphy inguinal;  Surgeon: Emil Echevarria MD;  [facility]     IR LIVER BIOPSY PERCUTANEOUS  2018     LAPAROTOMY EXPLORATORY      per the patient \"for infection in the LLQ\"      TRANSPLANT LIVER RECIPIENT  DONOR N/A 2018    Procedure: TRANSPLANT LIVER RECIPIENT  DONOR;  Surgeon: Emil Echevarria MD;  [facility]     Current Outpatient Medications   Medication     amLODIPine (NORVASC) 5 MG tablet     atorvastatin (LIPITOR) 40 MG tablet     blood glucose (NO BRAND SPECIFIED) lancets standard     blood glucose (ONETOUCH VERIO IQ) test strip     blood glucose monitoring (NO BRAND SPECIFIED) meter device kit     Lancets (ONETOUCH DELICA PLUS [id number]) MISC     losartan (COZAAR) 50 MG tablet     Metoprolol Tartrate 75 MG TABS     mycophenolate (GENERIC EQUIVALENT) 250 MG capsule     nitroGLYcerin (NITROSTAT) 0.4 MG sublingual tablet     predniSONE (DELTASONE) 5 MG tablet     ticagrelor (BRILINTA) 90 MG tablet     ursodiol (ACTIGALL) 250 MG tablet     dulaglutide (TRULICITY) 0.75 MG/0.5ML pen     glucose 40 % (400 mg/mL) gel     insulin glargine (LANTUS PEN) 100 UNIT/ML pen     insulin pen needle (BD KIRK U/F) 32G X 4 MM miscellaneous     levothyroxine (SYNTHROID/LEVOTHROID) 125 MCG tablet     metFORMIN (GLUCOPHAGE-XR) 500 MG 24 hr tablet     Sharps Container MISC     Vitamin D3 (CHOLECALCIFEROL) 25 mcg (1000 units) tablet     No current facility-administered medications for this visit.     No Known Allergies          Review of Systems      Objective    BP (!) 149/80 (BP Location: Right arm, Patient Position: Sitting, Cuff Size: Adult Regular)   Pulse 61   Wt 89.4 kg (197 lb)   SpO2 97%   BMI 30.85 kg/m    Body mass index is 30.85 kg/m .  Physical Exam   GENERAL: " healthy, alert and no distress  NECK: no adenopathy, no asymmetry, masses, or scars and thyroid normal to palpation  RESP: lungs clear to auscultation - no rales, rhonchi or wheezes  CV: regular rate and rhythm, normal S1 S2, no S3 or S4, no murmur, click or rub, no peripheral edema and peripheral pulses strong  ABDOMEN: soft, nontender, no hepatosplenomegaly, no masses and bowel sounds normal  MS: no gross musculoskeletal defects noted, no edema            .  ..

## 2022-08-02 DIAGNOSIS — N18.2 CKD (CHRONIC KIDNEY DISEASE) STAGE 2, GFR 60-89 ML/MIN: Primary | ICD-10-CM

## 2022-08-04 ENCOUNTER — TELEPHONE (OUTPATIENT)
Dept: NEPHROLOGY | Facility: CLINIC | Age: 67
End: 2022-08-04

## 2022-09-01 DIAGNOSIS — I48.91 ATRIAL FIBRILLATION WITH RAPID VENTRICULAR RESPONSE (H): ICD-10-CM

## 2022-09-01 DIAGNOSIS — E11.9 TYPE 2 DIABETES MELLITUS WITHOUT COMPLICATION, WITH LONG-TERM CURRENT USE OF INSULIN (H): Primary | ICD-10-CM

## 2022-09-01 DIAGNOSIS — Z79.4 TYPE 2 DIABETES MELLITUS WITHOUT COMPLICATION, WITH LONG-TERM CURRENT USE OF INSULIN (H): Primary | ICD-10-CM

## 2022-09-02 DIAGNOSIS — E11.9 TYPE 2 DIABETES MELLITUS (H): ICD-10-CM

## 2022-09-02 NOTE — TELEPHONE ENCOUNTER
"Pharmacy faxes order request for Lantus. Hx: Lantus 10 units 2x daily  visit 05/31/2022 with Leia Edward  This patient was a no show for this scheduled appointment.    LCV: 09/21/2021 with Leia Edward notes: Hx:\"Lantus 10 units qhs only, unless high (>140 in the morning) then puts bid\"  A/P:Plan: \"Continue current metformin, Januvia. Lantus for now.\"    RTC in place 10/04/2022 with Leia Edward.   Order routed to Dr Tavares, visit 10/09/2020 as Leia Wagner is out of clinic through 09/06/22.         "

## 2022-09-06 NOTE — TELEPHONE ENCOUNTER
Metoprolol Tartrate 75 MG TABS      Last Written Prescription Date:  10/4/2021  Last Fill Quantity: 240 *4 mo supply trip to [facility],   # refills: 0  Last Office Visit : 7/27/2022  Future Office visit:  10/26/2022    Routing refill request to provider for review/approval because:  Refill needs review- gap in time since last refill.  - last written 10/4/2021 Disp:240 R:0

## 2022-09-07 RX ORDER — METOPROLOL TARTRATE 75 MG/1
1 TABLET, FILM COATED ORAL 2 TIMES DAILY
Qty: 180 TABLET | Refills: 3 | Status: SHIPPED | OUTPATIENT
Start: 2022-09-07 | End: 2022-10-21

## 2022-09-21 ENCOUNTER — OFFICE VISIT (OUTPATIENT)
Dept: GASTROENTEROLOGY | Facility: CLINIC | Age: 67
End: 2022-09-21
Attending: INTERNAL MEDICINE
Payer: COMMERCIAL

## 2022-09-21 VITALS
DIASTOLIC BLOOD PRESSURE: 76 MMHG | OXYGEN SATURATION: 98 % | BODY MASS INDEX: 30.61 KG/M2 | WEIGHT: 195.5 LBS | HEART RATE: 60 BPM | SYSTOLIC BLOOD PRESSURE: 152 MMHG

## 2022-09-21 DIAGNOSIS — Z94.4 LIVER TRANSPLANT RECIPIENT (H): ICD-10-CM

## 2022-09-21 DIAGNOSIS — H57.11 EYE PAIN, RIGHT: Primary | ICD-10-CM

## 2022-09-21 PROCEDURE — G0463 HOSPITAL OUTPT CLINIC VISIT: HCPCS

## 2022-09-21 PROCEDURE — 99214 OFFICE O/P EST MOD 30 MIN: CPT | Mod: GC | Performed by: INTERNAL MEDICINE

## 2022-09-21 ASSESSMENT — PAIN SCALES - GENERAL: PAINLEVEL: NO PAIN (0)

## 2022-09-21 NOTE — NURSING NOTE
Chief Complaint   Patient presents with     RECHECK     Follow up     Blood pressure (!) 152/76, pulse 60, weight 88.7 kg (195 lb 8 oz), SpO2 98 %.    Jessica Phipps

## 2022-09-21 NOTE — PROGRESS NOTES
[facility]    Follow-up Visit    Follow-up visit in Liver Transplant Clinic Post-Transplant.   Patient is [place]ian speaking and video  used for duration of this clinical encounter.       Subjective:  67 year old male w/ a PMHx of EtOH Cirrhosis c/b HCC s/p  TACE and DDLT in 2018 w/ + viable tumor on explant w/o disease re-occurance who presents today for follow-up visit.       Since he was last seen in clinic in 2022, patient reports that he has been in overall good health from a liver perspective. He reports ongoing compliance on IS; current regimen of Mycophenolate 750mg BID and 5mg Daily of Prednisone. Most recent hepatic function panel with nl liver enzymes ( 2022) . Plt ct and INR wnl. MRI Abdomen in 3/2022 without evidence of suspicious hepatic lesion cocnerning for malignancy. Today, he denies jaundice, lower extremity edema, abdominal distension, lethargy or confusion.He denies melena, hematemesis or hematochezia. He denies any fevers, sweats/chills or wt loss.      With regards to his hx of prostate cancer, patient is s.p RALP in 2020 following diagnosis in 2019 w/final path revealing [place] 3+4=7 w/negative margins; [id number]. He continues top follow with Urology and denies any urinary symptoms today. Last PSA < 0.1 in 2020.         Medical hx Surgical hx   Past Medical History:   Diagnosis Date     Anemia      Biliary stricture of transplanted liver (H) 2018     BPH (benign prostatic hyperplasia)      Cancer (H)     hepatocellualr carcinoma     Cirrhosis of liver (H) 2018     Diabetes (H)      Enlarged prostate      Hypothyroidism      Impotence of organic origin 2020     Inguinal hernia     Repaired with mesh on 18     Liver lesion 2018     Liver transplanted (H) 2018     donor liver transplant     Portal vein thrombosis     on path explant     Postoperative atrial fibrillation (H) 2018     Prostate cancer (H)       TB lung, latent     Treated       Past Surgical History:   Procedure Laterality Date     APPENDECTOMY       COLONOSCOPY      2018     CV CORONARY ANGIOGRAM N/A 10/13/2021    Procedure: CV CORONARY ANGIOGRAM;  Surgeon: Yaya Hampton MD;  [facility]     CV CORONARY LITHOTRIPSY PCI N/A 10/13/2021    Procedure: CV Coronary Lithotripsy PCI;  Surgeon: Yaya Hampton MD;  Location:  HE[facility] CARDIAC CATH LAB     CV INSTANTANEOUS WAVE-FREE RATIO N/A 10/13/2021    Procedure: Instantaneous Wave-Free Ratio;  Surgeon: Yaya Hampton MD;  [facility]     CV INTRAVASULAR ULTRASOUND N/A 10/13/2021    Procedure: Intravascular Ultrasound;  Surgeon: Yaya Hampton MD;  Location:  HEART CARDIAC CATH LAB     CV PCI STENT DRUG ELUTING N/A 10/13/2021    Procedure: Percutaneous Coronary Intervention Stent Drug Eluting;  Surgeon: Yaya Hampton MD;  [facility]     DAVINCI PROSTATECTOMY N/A 1/3/2020    Procedure: Robot-assisted laparoscopic radical prostatectomy, Bladder biopsy;  Surgeon: Kenrick Casiano MD;  [facility]     ENDOSCOPIC RETROGRADE CHOLANGIOPANCREATOGRAM N/A 12/28/2018    Procedure: ENDOSCOPIC RETROGRADE CHOLANGIOPANCREATOGRAM, with Billary Sphincterotomy and Billary Stent Placement;  Surgeon: Omero Lawler MD;  [facility]     ENDOSCOPIC RETROGRADE CHOLANGIOPANCREATOGRAM  2/18/2019    Procedure: COMBINED ENDOSCOPIC RETROGRADE CHOLANGIOPANCREATOGRAPHY, Bile Duct Stent Exchange;  Surgeon: Omero Lawler MD;  [facility]     ENDOSCOPIC RETROGRADE CHOLANGIOPANCREATOGRAM N/A 4/29/2019    Procedure: ENDOSCOPIC RETROGRADE CHOLANGIOPANCREATOGRAPHY, WITH BILIARY STENT REMOVAL AND STONE EXTRACTION;  Surgeon: Omero Lawler MD;  [facility]     HERNIORRHAPHY INGUINAL  12/22/2018    Procedure: Herniorrhaphy inguinal;  Surgeon:  "Emil Echevarria MD;  [facility]     IR LIVER BIOPSY PERCUTANEOUS  2018     LAPAROTOMY EXPLORATORY      per the patient \"for infection in the LLQ\"      TRANSPLANT LIVER RECIPIENT  DONOR N/A 2018    Procedure: TRANSPLANT LIVER RECIPIENT  DONOR;  Surgeon: Emil Echevarria MD;  [facility]          Medications  Prior to Admission medications    Medication Sig Start Date End Date Taking? Authorizing Provider   amLODIPine (NORVASC) 5 MG tablet Take 1 tablet (5 mg) by mouth daily 3/2/22   Jacqueline Juarez NP   atorvastatin (LIPITOR) 40 MG tablet Take 1 tablet (40 mg) by mouth daily 22   Jaswinder Anne MD   blood glucose (NO BRAND SPECIFIED) lancets standard Use to test blood sugar 4 times daily or as directed. 21   Jaswinder Anne MD   blood glucose (ONETOUCH VERIO IQ) test strip Use to test blood sugar 4 times daily or as directed. 22   Jaswinder Anne MD   blood glucose monitoring (NO BRAND SPECIFIED) meter device kit Use to test blood sugar 4 times daily or as directed. Please let the pt know when it is ready to . 22   Jaswinder Anne MD   dulaglutide (TRULICITY) 0.75 MG/0.5ML pen Inject 1.5 mg Subcutaneous every 7 days  Patient not taking: Reported on 2022   Jaswinder Anne MD   glucose 40 % (400 mg/mL) gel Take 15-30 g by mouth every 15 minutes as needed for low blood sugar  Patient not taking: Reported on 2022   Jaswinder Anne MD   insulin glargine (LANTUS PEN) 100 UNIT/ML pen Take 10 units in the morning and 10 units in the evening. Please keep upcoming visit with endocrine provider, Leia Edward. 22   Sue Tavares MD   insulin pen needle (31G X 5 MM) 31G X 5 MM miscellaneous Use one  pen needles daily or as directed. 22   Leia Edward PA-C   insulin pen needle (BD KIRK U/F) 32G X 4 MM miscellaneous Use 1 daily.  Patient not taking: Reported on 2022   " Leia Edward, NAIN   Lancets (ONETOUCH DELICA PLUS [id number]) MISC U TO TEST QID OR UTD 3/3/20   Reported, Patient   levothyroxine (SYNTHROID/LEVOTHROID) 125 MCG tablet Take 1 tablet (125 mcg) by mouth daily  Patient not taking: Reported on 7/27/2022 1/25/22   Jaswinder Anne MD   losartan (COZAAR) 50 MG tablet Take 2 tablets (100 mg) by mouth daily 3/2/22   Jacqueline Juarez, NP   metFORMIN (GLUCOPHAGE-XR) 500 MG 24 hr tablet Take 2 tablets (1,000 mg) by mouth 2 times daily (with meals)  Patient not taking: Reported on 7/27/2022 1/25/22   Jaswinder Anne MD   Metoprolol Tartrate 75 MG TABS Take 1 tablet by mouth 2 times daily 9/7/22   Jaswinder Anne MD   mycophenolate (GENERIC EQUIVALENT) 250 MG capsule TAKE 3 CAPSULES(750 MG) BY MOUTH TWICE DAILY 7/8/22   Tamela Carballo MD   nitroGLYcerin (NITROSTAT) 0.4 MG sublingual tablet For chest pain place 1 tablet under the tongue every 5 minutes for 3 doses. If symptoms persist 5 minutes after 1st dose call 911. 4/1/22   Miguel Ángel Hunter MD   predniSONE (DELTASONE) 5 MG tablet Take 1 tablet (5 mg) by mouth daily 3/24/22   Tamela Carballo MD   Sharps Container MISC 1 each daily  Patient not taking: Reported on 7/27/2022 1/7/19   Inez Baker, APRN CNP   ticagrelor (BRILINTA) 90 MG tablet Take 1 tablet (90 mg) by mouth 2 times daily 1/25/22   Jaswinder Anne MD   ursodiol (ACTIGALL) 250 MG tablet Take 1 tablet (250 mg) by mouth 2 times daily 2/22/21   Tamela Carballo MD   Vitamin D3 (CHOLECALCIFEROL) 25 mcg (1000 units) tablet Take 2 tablets (50 mcg) by mouth daily  Patient not taking: Reported on 7/27/2022 5/4/22   Jacqueline Juarez, NP       Allergies  No Known Allergies    Review of systems  A 10-point review of systems was negative    Examination  BP (!) 152/76   Pulse 60   Wt 88.7 kg (195 lb 8 oz)   SpO2 98%   BMI 30.61 kg/m    There is no height or weight on file to  calculate BMI.    Gen- well, NAD, A+Ox3, normal color  Lym- no palpable LAD  CVS- RRR  RS- CTA  Abd- protuberant. Non-tender. Non-distended   Extr- hands normal, no OSKAR  Skin- no rash or jaundice  Neuro- no asterixis  Psych- normal mood    Laboratory  Lab Results   Component Value Date     07/27/2022     07/07/2021    POTASSIUM 4.5 07/27/2022    POTASSIUM 4.7 07/07/2021    CHLORIDE 106 07/27/2022    CHLORIDE 106 07/07/2021    CO2 26 07/27/2022    CO2 25 07/07/2021    BUN 19 07/27/2022    BUN 26 07/07/2021    CR 0.79 07/27/2022    CR 0.79 07/07/2021       Lab Results   Component Value Date    BILITOTAL 0.9 07/27/2022    BILITOTAL 0.9 07/07/2021    ALT 27 07/27/2022    ALT 17 07/07/2021    AST 17 07/27/2022    AST 11 07/07/2021    ALKPHOS 125 07/27/2022    ALKPHOS 143 07/07/2021       Lab Results   Component Value Date    ALBUMIN 3.1 07/27/2022    ALBUMIN 3.4 07/07/2021    PROTTOTAL 6.6 07/27/2022    PROTTOTAL 7.1 07/07/2021        Lab Results   Component Value Date    WBC 7.1 07/27/2022    WBC 7.0 07/07/2021    HGB 14.6 07/27/2022    HGB 14.0 07/07/2021    MCV 88 07/27/2022    MCV 83 07/07/2021     07/27/2022     07/07/2021       Lab Results   Component Value Date    INR 1.02 04/23/2021       Radiology    Assessment  67 year old male w/ a PMHx of EtOH Cirrhosis c/b HCC s/p  TACE and DDLT in 2018 w/ + viable tumor on explant w/o disease re-occurance who presents today for follow-up visit.     Fam Granado was seen today for recheck.    Diagnoses and all orders for this visit:      #Hx of EtOH Cirrhosis   #Hx of HCC s/p TACE in 2018 and LT   #Liver transplant recipient (H)  -From a post-LT standpoint patient appears to be doing well overall. He is tolerating and remains adherent to current IS regimen.   -Follows with PCP regarding skin exam( no concerning lesions on exam today)\    >Continues to defer Colon Ca screening at this time   Plan for:  -Continue with current IS regimen    -Continue with HCC and Prostate Ca follow-up per Onc and Urology--> MRI A/P per Onc       #Eye pain, right  -Patient reports 3-weeks of progressive bilateral decreased visual acuity and R. Eye pain. FHx of early glaucoma with vision loss in his father.   -Overall clinical picture concerning for underlying Glaucoma.   Plan for:   -     Adult Eye  Referral; Future      RTC in 6-months     YOSELIN GARCIA MD  Hepatology  [facility]    Attestation:  This patient has been seen and evaluated by me, Tamela Carballo.  Discussed with the house staff team or resident(s) and agree with the findings and plan in this note.

## 2022-09-22 ENCOUNTER — ONCOLOGY VISIT (OUTPATIENT)
Dept: ONCOLOGY | Facility: CLINIC | Age: 67
End: 2022-09-22
Attending: INTERNAL MEDICINE
Payer: COMMERCIAL

## 2022-09-22 VITALS
RESPIRATION RATE: 16 BRPM | BODY MASS INDEX: 31.16 KG/M2 | DIASTOLIC BLOOD PRESSURE: 70 MMHG | SYSTOLIC BLOOD PRESSURE: 127 MMHG | HEART RATE: 56 BPM | TEMPERATURE: 98.3 F | OXYGEN SATURATION: 98 % | WEIGHT: 199 LBS

## 2022-09-22 DIAGNOSIS — C61 PROSTATE CANCER (H): ICD-10-CM

## 2022-09-22 DIAGNOSIS — C22.0 HCC (HEPATOCELLULAR CARCINOMA) (H): Primary | ICD-10-CM

## 2022-09-22 PROCEDURE — G0463 HOSPITAL OUTPT CLINIC VISIT: HCPCS

## 2022-09-22 PROCEDURE — 99214 OFFICE O/P EST MOD 30 MIN: CPT | Performed by: INTERNAL MEDICINE

## 2022-09-22 ASSESSMENT — PAIN SCALES - GENERAL: PAINLEVEL: NO PAIN (0)

## 2022-09-22 NOTE — LETTER
9/22/2022         RE: Loy Limon  [address]  [facility] [id number]        Dear Colleague,    Thank you for referring your patient, Loy Limon, to the [facility]. Please see a copy of my visit note below.    Oncology follow up visit:  Date on this visit: 9/22/22     HCC    Primary Physician: [facility], Oncology     History Of Present Illness:      Please see previous note for details. I have copied and updated from prior note.      Mr. Limon is a 67 year old male who has been a chronic heavy alcohol drinker presented with right abdominal pain in March 2018 and workup including a CT scan showed cirrhosis, portal hypertension and 2 hepatic lesions in the right hepatic lobe which were concerning for hepatocellular carcinoma.  He had MRI on 3/16/2018 which confirmed cirrhosis and portal hypertension and splenomegaly. 3.7 cm segment 8 lesion was OPTN 5B.  There was an antecedent 0.9 cm satellite nodule in hepatic segment 8 consistent with LI-RADS 4.  In segment 7, 1.5 cm LIRADS 4 lesion was seen  Another 1.9 cm segment 7 lesion was seen which was indeterminate.  Several other LIRADS 3 lesions were scattered.  Alpha-fetoprotein was not elevated.  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 Hepatitis B surface antibody was reactive consistent with effective vaccination. Hepatitis C antibody was nonreactive.  He has had no ascites. He has had mild hepatic and Neuropathy but was unable to tolerate lactulose because of abdominal discomfort. He has no history of variceal bleeding although he has grade 2 esophageal varices which have not been banded and he continues to be on propranolol.    He had repeat MRI done on 5/24/2018 which showed stable to slight increase in size of the segment 8 lesion.  Segment 7 lesion was consistent with LIRADS 4 lesion  Another segment 7 lesion is also consistent with  LIRADS 4 lesion  CT of the chest showed a 4 mm solitary pulmonary nodule in the right lower lobe which remains indeterminate. There was no other evidence of metastasis.  Bone scan was negative for any evidence of metastatic disease.    He had chemoembolization of the S8 lesion on 6/13/18.    As there was residual tumor left, he had microwave ablation of residual mass in hepatic segment 8 on 7/2/2018.     S7 IRADS 4 lesions were not treated    Repeat MRI on 10/3/18 shows no residual viable tumor in S8 lesion  S7 LIRADS 4 lesion was less well defined.  He has some scattered LIRADS 3 lesions.    He had liver transplant in Dec 2018 and explanted liver showed S8 viable tumor with 90% necrosis.  S7 1.1 cm HCC and S5 1.4 cm HCC.  3 benign lymph nodes.  It was a ypT2N0 lesion.    He developed prostate cancer (4/26/19 - PSA 5.51ng/dL) and is now s/p RALP with Dr. Casiano on 1/3/20, final path: [place] 3+4=7, neg margins, [id number].   Last PSA on 7/3/2020- <0.01.      Interval hx:   A professional German Interpretor is available throughout the visit through iPad    He feels well.  He saw Dr. Carballo yesterday and I reviewed the note.  He denies any abdominal pain.  Eating well.  No diarrhea constipation or bleeding.  No infections or shortness of breath.  No new swellings.  He has some bloating sensation in the abdomen since transplant surgery.  He wants me to check PSA with next lab draw.    ECOG 1    ROS:  Rest of the comprehensive review of the system was essentially unremarkable.        I reviewed other history in epic as below.    Past Medical/Surgical History:  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/28/2018     BPH (benign prostatic hyperplasia)      Cancer (H)     hepatocellualr carcinoma     Cirrhosis of liver (H) 05/08/2018     Diabetes (H)      Enlarged prostate      Hypothyroidism      Impotence of organic origin 09/25/2020     Inguinal hernia      Repaired with mesh on 18     Liver lesion 2018     Liver transplanted (H) 2018     donor liver transplant     Portal vein thrombosis     on path explant     Postoperative atrial fibrillation (H) 2018     Prostate cancer (H)      TB lung, latent     Treated      Past Surgical History:   Procedure Laterality Date     APPENDECTOMY       COLONOSCOPY           CV CORONARY ANGIOGRAM N/A 10/13/2021    Procedure: CV CORONARY ANGIOGRAM;  Surgeon: Yaya Hampton MD;  Location:  [facility]     CV CORONARY LITHOTRIPSY PCI N/A 10/13/2021    Procedure: CV Coronary Lithotripsy PCI;  Surgeon: Yaya Hampton MD;  Location:  [facility]     CV INSTANTANEOUS WAVE-FREE RATIO N/A 10/13/2021    Procedure: Instantaneous Wave-Free Ratio;  Surgeon: Yaya Hampton MD;  Location:  [facility]ATH [facility]     CV INTRAVASULAR ULTRASOUND N/A 10/13/2021    Procedure: Intravascular Ultrasound;  Surgeon: Yaya Hampton MD;  Location:  [facility]     CV PCI STENT DRUG ELUTING N/A 10/13/2021    Procedure: Percutaneous Coronary Intervention Stent Drug Eluting;  Surgeon: Yaya Hampton MD;  Location:  [facility]     DAVINCI PROSTATECTOMY N/A 1/3/2020    Procedure: Robot-assisted laparoscopic radical prostatectomy, Bladder biopsy;  Surgeon: Kenrick Casiano MD;  Location: [facility]     ENDOSCOPIC RETROGRADE CHOLANGIOPANCREATOGRAM N/A 2018    Procedure: ENDOSCOPIC RETROGRADE CHOLANGIOPANCREATOGRAM, with Billary Sphincterotomy and Billary Stent Placement;  Surgeon: Omero Lawler MD;  Location: [facility]     ENDOSCOPIC RETROGRADE CHOLANGIOPANCREATOGRAM  2019    Procedure: COMBINED ENDOSCOPIC RETROGRADE CHOLANGIOPANCREATOGRAPHY, Bile Duct Stent Exchange;  Surgeon: Omero Lawler MD;  Location: [facility]     ENDOSCOPIC RETROGRADE CHOLANGIOPANCREATOGRAM N/A  "2019    Procedure: ENDOSCOPIC RETROGRADE CHOLANGIOPANCREATOGRAPHY, WITH BILIARY STENT REMOVAL AND STONE EXTRACTION;  Surgeon: Omero Lawler MD;  Location: [facility]     HERNIORRHAPHY INGUINAL  2018    Procedure: Herniorrhaphy inguinal;  Surgeon: Emil Echevarria MD;  Location: [facility]     IR LIVER BIOPSY PERCUTANEOUS  2018     LAPAROTOMY EXPLORATORY      per the patient \"for infection in the LLQ\"      TRANSPLANT LIVER RECIPIENT  DONOR N/A 2018    Procedure: TRANSPLANT LIVER RECIPIENT  DONOR;  Surgeon: Emil Echevarria MD;  Location: [facility] OR     Cancer History:   As above    Allergies:  Allergies as of 2022     (No Known Allergies)     Current Medications:  Current Outpatient Medications   Medication Sig Dispense Refill     amLODIPine (NORVASC) 5 MG tablet Take 1 tablet (5 mg) by mouth daily 90 tablet 3     atorvastatin (LIPITOR) 40 MG tablet Take 1 tablet (40 mg) by mouth daily 90 tablet 3     blood glucose (NO BRAND SPECIFIED) lancets standard Use to test blood sugar 4 times daily or as directed. 400 each 2     blood glucose (ONETOUCH VERIO IQ) test strip Use to test blood sugar 4 times daily or as directed. 400 strip 3     blood glucose monitoring (NO BRAND SPECIFIED) meter device kit Use to test blood sugar 4 times daily or as directed. Please let the pt know when it is ready to . 1 kit 0     dulaglutide (TRULICITY) 0.75 MG/0.5ML pen Inject 1.5 mg Subcutaneous every 7 days (Patient not taking: No sig reported) 13 mL 3     glucose 40 % (400 mg/mL) gel Take 15-30 g by mouth every 15 minutes as needed for low blood sugar (Patient not taking: No sig reported) 30 g 3     insulin glargine (LANTUS PEN) 100 UNIT/ML pen Take 10 units in the morning and 10 units in the evening. Please keep upcoming visit with endocrine provider, Leia Edward. 15 mL 0     insulin pen needle (31G X 5 MM) 31G X 5 MM miscellaneous Use one  pen needles daily or as directed. 100 " each 1     insulin pen needle (BD KIRK U/F) 32G X 4 MM miscellaneous Use 1 daily. (Patient not taking: No sig reported) 100 each 1     Lancets (ONETOUCH DELICA PLUS [id number]) MISC U TO TEST QID OR UTD       levothyroxine (SYNTHROID/LEVOTHROID) 125 MCG tablet Take 1 tablet (125 mcg) by mouth daily (Patient not taking: No sig reported) 90 tablet 3     losartan (COZAAR) 50 MG tablet Take 2 tablets (100 mg) by mouth daily 180 tablet 0     metFORMIN (GLUCOPHAGE-XR) 500 MG 24 hr tablet Take 2 tablets (1,000 mg) by mouth 2 times daily (with meals) (Patient not taking: No sig reported) 360 tablet 3     Metoprolol Tartrate 75 MG TABS Take 1 tablet by mouth 2 times daily 180 tablet 3     mycophenolate (GENERIC EQUIVALENT) 250 MG capsule TAKE 3 CAPSULES(750 MG) BY MOUTH TWICE DAILY 540 capsule 3     nitroGLYcerin (NITROSTAT) 0.4 MG sublingual tablet For chest pain place 1 tablet under the tongue every 5 minutes for 3 doses. If symptoms persist 5 minutes after 1st dose call 911. 30 tablet 1     predniSONE (DELTASONE) 5 MG tablet Take 1 tablet (5 mg) by mouth daily 90 tablet 3     Sharps Container MISC 1 each daily (Patient not taking: No sig reported) 1 each 2     ticagrelor (BRILINTA) 90 MG tablet Take 1 tablet (90 mg) by mouth 2 times daily 180 tablet 3     ursodiol (ACTIGALL) 250 MG tablet Take 1 tablet (250 mg) by mouth 2 times daily 180 tablet 3     Vitamin D3 (CHOLECALCIFEROL) 25 mcg (1000 units) tablet Take 2 tablets (50 mcg) by mouth daily (Patient not taking: No sig reported) 180 tablet 3      Family History:  Family History   Problem Relation Age of Onset     Memory loss Mother      Liver Disease Brother      Skin Cancer No family hx of      Melanoma No family hx of       Maternal grand mother had Uterus cancer at 63  He has 3 living children. One son with schizophrenia  One daughter dies of leukemia at 8 years of age  Son  shortly after birth. He had some brain congenital anomaly    Social History:  Social  History     Socioeconomic History     Marital status:      Spouse name: Not on file     Number of children: Not on file     Years of education: Not on file     Highest education level: Not on file   Occupational History     Not on file   Tobacco Use     Smoking status: Former Smoker     Packs/day: 0.03     Years: 20.00     Pack years: 0.60     Types: Cigarettes, Cigars     Start date: 1970     Quit date: 3/14/2018     Years since quittin.5     Smokeless tobacco: Never Used     Tobacco comment: Quit smoking 2018, one cigarette after dinner   Substance and Sexual Activity     Alcohol use: No     Comment: Quit drinking 2018     Drug use: No     Sexual activity: Not on file   Other Topics Concern     Parent/sibling w/ CABG, MI or angioplasty before 65F 55M? Not Asked   Social History Narrative    Born in [place], came to [place] years ago.     Has worked in [employer], trimming meats     Social Determinants of Health     Financial Resource Strain: Not on file   Food Insecurity: Not on file   Transportation Needs: Not on file   Physical Activity: Not on file   Stress: Not on file   Social Connections: Not on file   Intimate Partner Violence: Not At Risk     Fear of Current or Ex-Partner: No     Emotionally Abused: No     Physically Abused: No     Sexually Abused: No   Housing Stability: Not on file   smoker for 25 years. One pack for 1 week. Quit in 2018.  Has been a heavy drinker for 30 years, beer about 36 every week. Last drink was around 2018.  No drug use.  Lives with wife.     Physical Exam:  There were no vitals taken for this visit.   Wt Readings from Last 4 Encounters:   22 88.7 kg (195 lb 8 oz)   22 89.4 kg (197 lb)   22 90.1 kg (198 lb 11.2 oz)   22 90.1 kg (198 lb 11.2 oz)       CONSTITUTIONAL: No apparent distress  EYES: PERRLA, without pallor or jaundice  ENT/MOUTH: Ears unremarkable. No oral lesions  CVS: s1s2 normal  RESPIRATORY: Chest  is clear  GI: Abdomen is benign. Surgical scars well healed  NEURO: Alert and oriented ×3  INTEGUMENT: no concerning skin rashes   LYMPHATIC: no palpable lymphadenopathy  MUSCULOSKELETAL: Unremarkable. No bony tenderness.   EXTREMITIES: no pedal edema  PSYCH: Mentation, mood and affect are appropriate        Laboratory/Imaging Studies    Reviewed  7/27/2022.  CBC is unremarkable.  CMP unremarkable except albumin 3.1.  Calcium 8.2.    3/7/2022-  alpha-fetoprotein <1.5    MRI Abdomen on 3/7/2022 showed stable postsurgical changes of liver transplant without evidence of recurrent malignancy.          ASSESSMENT/PLAN:    HCC  S8 3.7 cm OPTN 5B lesion  S7 LIRADS 4 lesions x 2    No evidence of metastatic disease    He had chemoembolization of the S8 lesion on 6/13/18.    As there was residual tumor left, he had microwave ablation of residual mass in hepatic segment 8 on 7/2/2018.     S7 IRADS 4 lesions were not treated    Repeat MRI on 10/3/18 shows no residual viable tumor in S8 lesion  S7 LIRADS 4 lesion was less well defined.  He has some scattered LIRADS 3 lesions.      He had liver transplant in Dec 2018 and explanted liver showed S8 viable tumor with 90% necrosis.  S7 1.1 cm HCC and S5 1.4 cm HCC.  3 benign lymph nodes.  It was a ypT2N0 lesion.    Overall doing well.  He will have repeat MRI next month.  We will check alpha-fetoprotein serially.      Assuming there are no surprises on the upcoming MRI, I will plan to repeat MRI in April 2022 and see him after that    Prostate Cancer. He developed prostate cancer (4/26/19 - PSA 5.51ng/dL) and is now s/p RALP with Dr. Casiano on 1/3/20, final path: [place] 3+4=7, neg margins, [id number].   Last PSA was <0.01 on 9/9/2021.  He wants me to check PSA with next lab draw so I have added that on.  Going forward he should follow-up with urology.       We did not address the following today.  SOB. Has ENGLISH and has mild ankle swelling. No orthopnea or PND.  Cardiac  catheterization October 2021 showed 60% mid LAD stenosis and he had drug-eluting stent placed.  He has been a chronic smoker and has HTN HL and DM-he is following with cardiology.          Pulmonary nodule-indeterminate 4 mm nodule in  right lower lobe has now resolved. Will not repeat routine imaging for chest for now       RTC in April with lab/MRI prior    All questions answered and he agrees with the plan      Hi Melgar

## 2022-09-22 NOTE — PROGRESS NOTES
Oncology follow up visit:  Date on this visit: 9/22/22     HCC    Primary Physician: [facility], Oncology     History Of Present Illness:      Please see previous note for details. I have copied and updated from prior note.      Mr. Limon is a 67 year old male who has been a chronic heavy alcohol drinker presented with right abdominal pain in March 2018 and workup including a CT scan showed cirrhosis, portal hypertension and 2 hepatic lesions in the right hepatic lobe which were concerning for hepatocellular carcinoma.  He had MRI on 3/16/2018 which confirmed cirrhosis and portal hypertension and splenomegaly. 3.7 cm segment 8 lesion was OPTN 5B.  There was an antecedent 0.9 cm satellite nodule in hepatic segment 8 consistent with LI-RADS 4.  In segment 7, 1.5 cm LIRADS 4 lesion was seen  Another 1.9 cm segment 7 lesion was seen which was indeterminate.  Several other LIRADS 3 lesions were scattered.  Alpha-fetoprotein was not elevated.  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 Hepatitis B surface antibody was reactive consistent with effective vaccination. Hepatitis C antibody was nonreactive.  He has had no ascites. He has had mild hepatic and Neuropathy but was unable to tolerate lactulose because of abdominal discomfort. He has no history of variceal bleeding although he has grade 2 esophageal varices which have not been banded and he continues to be on propranolol.    He had repeat MRI done on 5/24/2018 which showed stable to slight increase in size of the segment 8 lesion.  Segment 7 lesion was consistent with LIRADS 4 lesion  Another segment 7 lesion is also consistent with LIRADS 4 lesion  CT of the chest showed a 4 mm solitary pulmonary nodule in the right lower lobe which remains indeterminate. There was no other evidence of metastasis.  Bone scan was negative for any evidence of metastatic disease.    He had chemoembolization of the S8  lesion on 18.    As there was residual tumor left, he had microwave ablation of residual mass in hepatic segment 8 on 2018.     S7 IRADS 4 lesions were not treated    Repeat MRI on 10/3/18 shows no residual viable tumor in S8 lesion  S7 LIRADS 4 lesion was less well defined.  He has some scattered LIRADS 3 lesions.    He had liver transplant in Dec 2018 and explanted liver showed S8 viable tumor with 90% necrosis.  S7 1.1 cm HCC and S5 1.4 cm HCC.  3 benign lymph nodes.  It was a ypT2N0 lesion.    He developed prostate cancer (19 - PSA 5.51ng/dL) and is now s/p RALP with Dr. Casiano on 1/3/20, final path: Marlette 3+4=7, neg margins, [id number].   Last PSA on 7/3/2020- <0.01.      Interval hx:   A professional Bulgarian Interpretor is available throughout the visit through iPad    He feels well.  He saw Dr. Carballo yesterday and I reviewed the note.  He denies any abdominal pain.  Eating well.  No diarrhea constipation or bleeding.  No infections or shortness of breath.  No new swellings.  He has some bloating sensation in the abdomen since transplant surgery.  He wants me to check PSA with next lab draw.    ECOG 1    ROS:  Rest of the comprehensive review of the system was essentially unremarkable.        I reviewed other history in epic as below.    Past Medical/Surgical History:  Past Medical History:   Diagnosis Date     Anemia      Biliary stricture of transplanted liver (H) 2018     BPH (benign prostatic hyperplasia)      Cancer (H)     hepatocellualr carcinoma     Cirrhosis of liver (H) 2018     Diabetes (H)      Enlarged prostate      Hypothyroidism      Impotence of organic origin 2020     Inguinal hernia     Repaired with mesh on 18     Liver lesion 2018     Liver transplanted (H) 2018     donor liver transplant     Portal vein thrombosis     on path explant     Postoperative atrial fibrillation (H) 2018     Prostate cancer (H)      TB lung, latent      Treated      Past Surgical History:   Procedure Laterality Date     APPENDECTOMY       COLONOSCOPY      2018     CV CORONARY ANGIOGRAM N/A 10/13/2021    Procedure: CV CORONARY ANGIOGRAM;  Surgeon: Yaya Hampton MD;  [facility]     CV CORONARY LITHOTRIPSY PCI N/A 10/13/2021    Procedure: CV Coronary Lithotripsy PCI;  Surgeon: Yaya Hampton MD;  [facility]     CV INSTANTANEOUS WAVE-FREE RATIO N/A 10/13/2021    Procedure: Instantaneous Wave-Free Ratio;  Surgeon: Yaya Hampton MD;  [facility]     CV INTRAVASULAR ULTRASOUND N/A 10/13/2021    Procedure: Intravascular Ultrasound;  Surgeon: Yaya Hampton MD;  [facility]     CV PCI STENT DRUG ELUTING N/A 10/13/2021    Procedure: Percutaneous Coronary Intervention Stent Drug Eluting;  Surgeon: Yaya Hampton MD;  [facility]     DAVINCI PROSTATECTOMY N/A 1/3/2020    Procedure: Robot-assisted laparoscopic radical prostatectomy, Bladder biopsy;  Surgeon: Kenrick Casiano MD;  [facility]: [facility]     ENDOSCOPIC RETROGRADE CHOLANGIOPANCREATOGRAM N/A 12/28/2018    Procedure: ENDOSCOPIC RETROGRADE CHOLANGIOPANCREATOGRAM, with Billary Sphincterotomy and Billary Stent Placement;  Surgeon: Omero Lawler MD;  Location: [facility]     ENDOSCOPIC RETROGRADE CHOLANGIOPANCREATOGRAM  2/18/2019    Procedure: COMBINED ENDOSCOPIC RETROGRADE CHOLANGIOPANCREATOGRAPHY, Bile Duct Stent Exchange;  Surgeon: Omero Lawler MD;  Location: [facility]     ENDOSCOPIC RETROGRADE CHOLANGIOPANCREATOGRAM N/A 4/29/2019    Procedure: ENDOSCOPIC RETROGRADE CHOLANGIOPANCREATOGRAPHY, WITH BILIARY STENT REMOVAL AND STONE EXTRACTION;  Surgeon: Omero Lawler MD;  Location: [facility]     HERNIORRHAPHY INGUINAL  12/22/2018    Procedure: Herniorrhaphy inguinal;  Surgeon: Emil Echevarria  "MD;  Location: [facility]     IR LIVER BIOPSY PERCUTANEOUS  2018     LAPAROTOMY EXPLORATORY      per the patient \"for infection in the LLQ\"      TRANSPLANT LIVER RECIPIENT  DONOR N/A 2018    Procedure: TRANSPLANT LIVER RECIPIENT  DONOR;  Surgeon: Emil Echevarria MD;  Location: [facility]     Cancer History:   As above    Allergies:  Allergies as of 2022     (No Known Allergies)     Current Medications:  Current Outpatient Medications   Medication Sig Dispense Refill     amLODIPine (NORVASC) 5 MG tablet Take 1 tablet (5 mg) by mouth daily 90 tablet 3     atorvastatin (LIPITOR) 40 MG tablet Take 1 tablet (40 mg) by mouth daily 90 tablet 3     blood glucose (NO BRAND SPECIFIED) lancets standard Use to test blood sugar 4 times daily or as directed. 400 each 2     blood glucose (ONETOUCH VERIO IQ) test strip Use to test blood sugar 4 times daily or as directed. 400 strip 3     blood glucose monitoring (NO BRAND SPECIFIED) meter device kit Use to test blood sugar 4 times daily or as directed. Please let the pt know when it is ready to . 1 kit 0     dulaglutide (TRULICITY) 0.75 MG/0.5ML pen Inject 1.5 mg Subcutaneous every 7 days (Patient not taking: No sig reported) 13 mL 3     glucose 40 % (400 mg/mL) gel Take 15-30 g by mouth every 15 minutes as needed for low blood sugar (Patient not taking: No sig reported) 30 g 3     insulin glargine (LANTUS PEN) 100 UNIT/ML pen Take 10 units in the morning and 10 units in the evening. Please keep upcoming visit with endocrine provider, Leia Edward. 15 mL 0     insulin pen needle (31G X 5 MM) 31G X 5 MM miscellaneous Use one  pen needles daily or as directed. 100 each 1     insulin pen needle (BD KIRK U/F) 32G X 4 MM miscellaneous Use 1 daily. (Patient not taking: No sig reported) 100 each 1     Lancets (ONETOUCH DELICA PLUS [id number]) MISC U TO TEST QID OR UTD       levothyroxine (SYNTHROID/LEVOTHROID) 125 MCG tablet Take 1 tablet (125 mcg) by " mouth daily (Patient not taking: No sig reported) 90 tablet 3     losartan (COZAAR) 50 MG tablet Take 2 tablets (100 mg) by mouth daily 180 tablet 0     metFORMIN (GLUCOPHAGE-XR) 500 MG 24 hr tablet Take 2 tablets (1,000 mg) by mouth 2 times daily (with meals) (Patient not taking: No sig reported) 360 tablet 3     Metoprolol Tartrate 75 MG TABS Take 1 tablet by mouth 2 times daily 180 tablet 3     mycophenolate (GENERIC EQUIVALENT) 250 MG capsule TAKE 3 CAPSULES(750 MG) BY MOUTH TWICE DAILY 540 capsule 3     nitroGLYcerin (NITROSTAT) 0.4 MG sublingual tablet For chest pain place 1 tablet under the tongue every 5 minutes for 3 doses. If symptoms persist 5 minutes after 1st dose call 911. 30 tablet 1     predniSONE (DELTASONE) 5 MG tablet Take 1 tablet (5 mg) by mouth daily 90 tablet 3     Sharps Container MISC 1 each daily (Patient not taking: No sig reported) 1 each 2     ticagrelor (BRILINTA) 90 MG tablet Take 1 tablet (90 mg) by mouth 2 times daily 180 tablet 3     ursodiol (ACTIGALL) 250 MG tablet Take 1 tablet (250 mg) by mouth 2 times daily 180 tablet 3     Vitamin D3 (CHOLECALCIFEROL) 25 mcg (1000 units) tablet Take 2 tablets (50 mcg) by mouth daily (Patient not taking: No sig reported) 180 tablet 3      Family History:  Family History   Problem Relation Age of Onset     Memory loss Mother      Liver Disease Brother      Skin Cancer No family hx of      Melanoma No family hx of       Maternal grand mother had Uterus cancer at 63  He has 3 living children. One son with schizophrenia  One daughter dies of leukemia at 8 years of age  Son  shortly after birth. He had some brain congenital anomaly    Social History:  Social History     Socioeconomic History     Marital status:      Spouse name: Not on file     Number of children: Not on file     Years of education: Not on file     Highest education level: Not on file   Occupational History     Not on file   Tobacco Use     Smoking status: Former Smoker      Packs/day: 0.03     Years: 20.00     Pack years: 0.60     Types: Cigarettes, Cigars     Start date: 1970     Quit date: 3/14/2018     Years since quittin.5     Smokeless tobacco: Never Used     Tobacco comment: Quit smoking 2018, one cigarette after dinner   Substance and Sexual Activity     Alcohol use: No     Comment: Quit drinking 2018     Drug use: No     Sexual activity: Not on file   Other Topics Concern     Parent/sibling w/ CABG, MI or angioplasty before 65F 55M? Not Asked   Social History Narrative    Born in [place], came to [place] years ago.     Has worked in [employer], trimming meats     Social Determinants of Health     Financial Resource Strain: Not on file   Food Insecurity: Not on file   Transportation Needs: Not on file   Physical Activity: Not on file   Stress: Not on file   Social Connections: Not on file   Intimate Partner Violence: Not At Risk     Fear of Current or Ex-Partner: No     Emotionally Abused: No     Physically Abused: No     Sexually Abused: No   Housing Stability: Not on file   smoker for 25 years. One pack for 1 week. Quit in 2018.  Has been a heavy drinker for 30 years, beer about 36 every week. Last drink was around 2018.  No drug use.  Lives with wife.     Physical Exam:  There were no vitals taken for this visit.   Wt Readings from Last 4 Encounters:   22 88.7 kg (195 lb 8 oz)   22 89.4 kg (197 lb)   22 90.1 kg (198 lb 11.2 oz)   22 90.1 kg (198 lb 11.2 oz)       CONSTITUTIONAL: No apparent distress  EYES: PERRLA, without pallor or jaundice  ENT/MOUTH: Ears unremarkable. No oral lesions  CVS: s1s2 normal  RESPIRATORY: Chest is clear  GI: Abdomen is benign. Surgical scars well healed  NEURO: Alert and oriented ×3  INTEGUMENT: no concerning skin rashes   LYMPHATIC: no palpable lymphadenopathy  MUSCULOSKELETAL: Unremarkable. No bony tenderness.   EXTREMITIES: no pedal edema  PSYCH: Mentation, mood and affect are  appropriate        Laboratory/Imaging Studies    Reviewed  7/27/2022.  CBC is unremarkable.  CMP unremarkable except albumin 3.1.  Calcium 8.2.    3/7/2022-  alpha-fetoprotein <1.5    MRI Abdomen on 3/7/2022 showed stable postsurgical changes of liver transplant without evidence of recurrent malignancy.          ASSESSMENT/PLAN:    HCC  S8 3.7 cm OPTN 5B lesion  S7 LIRADS 4 lesions x 2    No evidence of metastatic disease    He had chemoembolization of the S8 lesion on 6/13/18.    As there was residual tumor left, he had microwave ablation of residual mass in hepatic segment 8 on 7/2/2018.     S7 IRADS 4 lesions were not treated    Repeat MRI on 10/3/18 shows no residual viable tumor in S8 lesion  S7 LIRADS 4 lesion was less well defined.  He has some scattered LIRADS 3 lesions.      He had liver transplant in Dec 2018 and explanted liver showed S8 viable tumor with 90% necrosis.  S7 1.1 cm HCC and S5 1.4 cm HCC.  3 benign lymph nodes.  It was a ypT2N0 lesion.    Overall doing well.  He will have repeat MRI next month.  We will check alpha-fetoprotein serially.      Assuming there are no surprises on the upcoming MRI, I will plan to repeat MRI in April 2022 and see him after that    Prostate Cancer. He developed prostate cancer (4/26/19 - PSA 5.51ng/dL) and is now s/p RALP with Dr. Casiano on 1/3/20, final path: Rebecca 3+4=7, neg margins, [id number].   Last PSA was <0.01 on 9/9/2021.  He wants me to check PSA with next lab draw so I have added that on.  Going forward he should follow-up with urology.       We did not address the following today.  SOB. Has ENGLISH and has mild ankle swelling. No orthopnea or PND.  Cardiac catheterization October 2021 showed 60% mid LAD stenosis and he had drug-eluting stent placed.  He has been a chronic smoker and has HTN HL and DM-he is following with cardiology.          Pulmonary nodule-indeterminate 4 mm nodule in  right lower lobe has now resolved. Will not repeat routine imaging  for chest for now       RTC in April with lab/MRI prior    All questions answered and he agrees with the plan    Hi Melgar

## 2022-09-22 NOTE — NURSING NOTE
"Oncology Rooming Note    September 22, 2022 1:20 PM   Loy Limon is a 67 year old male who presents for:    Chief Complaint   Patient presents with     Oncology Clinic Visit     Rtn for prostate cancer, HCC     Initial Vitals: /70 (BP Location: Right arm, Patient Position: Sitting, Cuff Size: Adult Large)   Pulse 56   Temp 98.3  F (36.8  C) (Oral)   Resp 16   Wt 90.3 kg (199 lb)   SpO2 98%   BMI 31.16 kg/m   Estimated body mass index is 31.16 kg/m  as calculated from the following:    Height as of 5/4/22: 1.702 m (5' 7.01\").    Weight as of this encounter: 90.3 kg (199 lb). Body surface area is 2.07 meters squared.  No Pain (0) Comment: Data Unavailable   No LMP for male patient.  Allergies reviewed: Yes  Medications reviewed: Yes    Medications: Medication refills not needed today.  Pharmacy name entered into EPIC:    [facility] - [place], [place] - [address]  [facility] - [place], [place] - [address]    Clinical concerns: Patient has no specific questions or clinical concerns outside of the reason for the visit.       Bhavya Smith, EMT            "

## 2022-10-03 ENCOUNTER — DOCUMENTATION ONLY (OUTPATIENT)
Dept: TRANSPLANT | Facility: CLINIC | Age: 67
End: 2022-10-03

## 2022-10-03 NOTE — PROGRESS NOTES
Annual chart review completed.      Patient is up to date with appointments but overdue for labs.    Please send Claribel a letter reminding him to have lab testing every 3 months.

## 2022-10-03 NOTE — PROGRESS NOTES
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  He has a history prostate cancer developed in 2019 and is status post RALP in 2020.      He has history of cirrhosis with HCC, portal HTN, latent TB and underwent living donor liver transplant on 12/22/2018. He developed steroid/immunosuppressant induced diabetes and was treated with NPH insulin, which was discontinued once he was no longer taking steroids.  He also has hypertension hyperlipidemia and a history of smoking.    He last saw Marcy in August 2021, he was taking Metformin 1000 mg twice daily, Januvia 25 mg daily, Lantus 10 units 2x daily. His average blood sugar was 257 without any hypoglycemia and a GMI of 9.5 we will increase his Lantus to 15 units in the morning and 15 in the evening and discussed how he might reduce his carbohydrate intake.    I met Ruyoksana in Sept 2021:  A1C 7.9 on same meds x reduced Lantus to 10 units once daily. Discussed possible GLP-1 agonist or SGLT2-i in place of Januvia and Lantus.  Noted overweight.      The patient's risk factor profile is: (+) HTN, (+) diabetes, (+) hyperlipidemia, (+) prior tobacco use, (-) family Hx CAD.     February 2022: A1c had risen to 10 in January.  We stopped Januvia and we started Trulicity at 0.75 mg weekly.   Advised him to increase this to 1.5 mg and 1 month if tolerated.      Interim:  Mr. Limon did not arrive for appointment with [facility] nephrology last month to evaluate his persistent proteinuria.  He had follow-up in oncology clinic last month and he noted no evidence of metastatic carcinoma and advised repeat MRI in April 2020 the coming months as well as alpha-fetoprotein.  They also did order a PSA but advise he follow-up with  urology.    Today:  Mr. Limon reports that he feels well he.  He has not been checking his blood sugar as his lancet does not work.   He has changed the needle but it still will not work and he has not been able to get a new one at his pharmacy as he understands he needs an order.  He denies any difficulties with symptoms of low or high blood sugar.  He reports that he continues to take his metformin and blood pressure medications.  He did not realize that he needs to take his thyroid medication fasting, but he does take it shortly after waking up and eats his breakfast about 15 minutes later.  He did not get glucose gel and he has not been taking Trulicity due to cost.  He has been enjoying the good weather and walking in the park most days.  He eats most of his meals at home, does not drink any soda avoids sweets and fast food.  He has fruits and vegetables most every day.    Meds : Amlodipine 5 mg  Losartan 50 mg petersen   Prednisone 5 mg  Levothyroixine 125 mcg  - taking fasting -eats 15 minutes later  Metfromin 500 mg ER 2 tabs bid      Loy's PMH, PSH and allergies were reviewed today and pertinent information is summarized above.  He lives with his wife Drea.  He does have children in the area but they generally they do not help him with getting medications appointments or technology.    Loy's pertinent social and family history are also reviewed today and pertinent information is summarized above.    Current Outpatient Medications   Medication     amLODIPine (NORVASC) 5 MG tablet     blood glucose (NO BRAND SPECIFIED) lancets standard     blood glucose (NO BRAND SPECIFIED) lancets standard     blood glucose (ONETOUCH VERIO IQ) test strip     blood glucose monitoring (NO BRAND SPECIFIED) meter device kit     insulin glargine (LANTUS PEN) 100 UNIT/ML pen     insulin pen needle (31G X 5 MM) 31G X 5 MM miscellaneous     Lancets (ONETOUCH DELICA PLUS [id number]) MISC     levothyroxine  "(SYNTHROID/LEVOTHROID) 125 MCG tablet     losartan (COZAAR) 50 MG tablet     mycophenolate (GENERIC EQUIVALENT) 250 MG capsule     predniSONE (DELTASONE) 5 MG tablet     atorvastatin (LIPITOR) 40 MG tablet     dulaglutide (TRULICITY) 0.75 MG/0.5ML pen     glucose 40 % (400 mg/mL) gel     insulin pen needle (BD KIRK U/F) 32G X 4 MM miscellaneous     metFORMIN (GLUCOPHAGE-XR) 500 MG 24 hr tablet     Metoprolol Tartrate 75 MG TABS     nitroGLYcerin (NITROSTAT) 0.4 MG sublingual tablet     Sharps Container MISC     ticagrelor (BRILINTA) 90 MG tablet     ursodiol (ACTIGALL) 250 MG tablet     Vitamin D3 (CHOLECALCIFEROL) 25 mcg (1000 units) tablet     No current facility-administered medications for this visit.         ROS:   Patient denies any fevers, chills or sweats as well as any changes in vision, problems with floaters or field cuts in vision, pain or problems with dentition, new or different headaches.  Patient denies symptoms of hypo and hyperglycemia except as above.   Patients denies marked fatigue, cough, shortness of breath, chest pain or pressure.  There has been no pain with or other changes in urination or  itching or pain in genital areas.  Patient denies any noted swelling in feet, ankles or otherwise, loss of sensation or pain  in feet or other areas.    Patient also denies current difficulties with depressed mood, anhedonia or worrying too much.        Exam:    BP (!) 148/73 (BP Location: Right arm, Patient Position: Sitting, Cuff Size: Adult Regular)   Pulse 50   Wt 90.2 kg (198 lb 12.8 oz)   BMI 31.13 kg/m      General: Pleasant, well nourished and hydrated male in NAD.   Psych:  Mood is \"good,\" affect is appropriate.  Thought form and content are fluid and coherent.    HEENT: Eyes and sclera are clear. Extraocular movements are grossly intact without proptosis.  Nares are patent, mucous membranes moist.  Neck: No masses or JVD are noted.    Resp: Easy and unlabored breathing.   Neuro: Alert and " oriented, communicating clearly.   Ext: no swelling or edema.  Good distal pulses and capillary refill in digits.  Skin in good condition.  Sensation is intact to monofilament testing bilaterally and throughout.    Data:      Recent Labs   Lab Test 10/04/22  1101 07/27/22  1344 07/27/22  1340 05/04/22  0920 03/02/22  1442 01/25/22  0858 01/25/22  0851 10/13/21  0905 09/13/21  0848 03/24/21  0812 02/12/21  0000 02/01/21  0723 09/25/20  0826   A1C  --   --  6.7*  --   --   --  10.0*  --  7.9*   < >  --   --   --    HEMOGLOBINA1  --   --   --   --   --   --   --   --   --   --  12.1*  --   --    TSH 5.43* 11.01*  --  1.55  --   --  12.29*  --  4.07*   < >  --   --   --    T4 1.54 0.85  --   --   --   --  1.00  --  1.00   < >  --   --   --    LDL  --   --   --   --   --   --  74  --  311*   < >  --   --   --    HDL  --   --   --   --   --   --  57  --  71   < >  --   --   --    TRIG  --   --   --   --   --   --  136  --  90   < >  --   --   --    CR  --  0.79  --  0.87   < >  --  0.80   < > 0.89   < >  --    < >  --    MICROL  --   --   --   --   --  5,750  --   --   --   --   --   --  764    < > = values in this interval not displayed.     Hemoglobin   Date Value Ref Range Status   07/27/2022 14.6 13.3 - 17.7 g/dL Final   07/07/2021 14.0 13.3 - 17.7 g/dL Final   ]    Most recent GFRs: Greater than 90 in July 2022.      Assessment/Plan:    Loy Limon is a 67 year old male with type 2 diabetes that was at goal when his A1c was last checked in July.    1 Diabetes -this well was well controlled when his A1c was last checked in July but today we have a paucity of data has not been checking his blood sugar due to not having any lancets.  He also is no longer taking Trulicity.    I did prescribe a lancets and today he will resume checking his blood sugar he understands that the goal fasting blood sugar would be less than 140 and bedtime less than 200, he will return to clinic sooner if blood sugars are above this goal  for his next A1c is greater than 7.  He will resume Trulicity and Lantus.  Note to pharmacy regarding more affordable alternatives.      He has follow-up with Dr. Reyes later this month, prefers to return to clinic in 6 months.    2. Hypertension: Blood pressure remains above goal even after recheck today.  He will continue to take losartan with increase his amlodipine from 5 to 10 mg.  I urged him to reschedule his follow-up with nephrology and discussed the importance of keeping this appointment.  He also plans to see Dr. Reyes for follow-up later this month.      2. DM Complications-     Lifestyle modification focusing on application of a Mediterranean diet or Dietary Approaches to Stop Hypertension (DASH) dietary pattern; reduction of saturated fat and trans fat; increase of dietary n-3 fatty acids, viscous fiber, and plant stanols/sterols intake; and increased physical activity recommended to improve the lipid profile and reduce the risk of developing atherosclerotic cardiovascular disease.     Retinopathy:   No known retinopathy; he has eye exam scheduled tomorrow.  Nephropathy:   Has a history of proteinuria and elevated blood pressure, urged her to reschedule with nephrology asking staff to assist him with this.  Neuropathy: No.    Feet: OK, no ulcers or lesions.   Lipids:10-year atherosclerotic cardiovascular disease risk >20%.  Patient taking a statin.      50 minutes spent on the date of the encounter doing chart review, history and exam, documentation, education and counseling, as well as communication and coordination of care, and further activities as noted above.  This time excludes time spent reviewing CGM.      It is my privilege to be involved in the care of the above patient.     Leia Edward PA-C, MPAS  [facility]  Diabetes, Endocrinology, and Metabolism  [phone] Appointments/Nurse  [phone] pager  [phone] nurse line    This note was completed in part  using Dragon voice recognition, and may contain word and grammatical errors.

## 2022-10-04 ENCOUNTER — LAB (OUTPATIENT)
Dept: LAB | Facility: CLINIC | Age: 67
End: 2022-10-04

## 2022-10-04 ENCOUNTER — OFFICE VISIT (OUTPATIENT)
Dept: ENDOCRINOLOGY | Facility: CLINIC | Age: 67
End: 2022-10-04
Attending: FAMILY MEDICINE
Payer: COMMERCIAL

## 2022-10-04 ENCOUNTER — TELEPHONE (OUTPATIENT)
Dept: TRANSPLANT | Facility: CLINIC | Age: 67
End: 2022-10-04

## 2022-10-04 VITALS
SYSTOLIC BLOOD PRESSURE: 148 MMHG | DIASTOLIC BLOOD PRESSURE: 73 MMHG | BODY MASS INDEX: 31.13 KG/M2 | HEART RATE: 50 BPM | WEIGHT: 198.8 LBS

## 2022-10-04 DIAGNOSIS — I10 ESSENTIAL HYPERTENSION: ICD-10-CM

## 2022-10-04 DIAGNOSIS — E03.9 HYPOTHYROIDISM, UNSPECIFIED TYPE: ICD-10-CM

## 2022-10-04 DIAGNOSIS — K70.30: ICD-10-CM

## 2022-10-04 DIAGNOSIS — I48.91 ATRIAL FIBRILLATION WITH RAPID VENTRICULAR RESPONSE (H): ICD-10-CM

## 2022-10-04 DIAGNOSIS — G63 POLYNEUROPATHY ASSOCIATED WITH UNDERLYING DISEASE (H): ICD-10-CM

## 2022-10-04 DIAGNOSIS — Z59.71 INSURANCE COVERAGE PROBLEMS: Primary | ICD-10-CM

## 2022-10-04 DIAGNOSIS — E11.9 DM TYPE 2, GOAL HBA1C 7%-8% (H): ICD-10-CM

## 2022-10-04 LAB
POTASSIUM SERPL-SCNC: 5.2 MMOL/L (ref 3.4–5.3)
T4 FREE SERPL-MCNC: 1.54 NG/DL (ref 0.9–1.7)
TSH SERPL DL<=0.005 MIU/L-ACNC: 5.43 UIU/ML (ref 0.3–4.2)

## 2022-10-04 PROCEDURE — 99000 SPECIMEN HANDLING OFFICE-LAB: CPT | Performed by: PATHOLOGY

## 2022-10-04 PROCEDURE — 84132 ASSAY OF SERUM POTASSIUM: CPT | Performed by: PATHOLOGY

## 2022-10-04 PROCEDURE — 36415 COLL VENOUS BLD VENIPUNCTURE: CPT | Performed by: PATHOLOGY

## 2022-10-04 PROCEDURE — 82088 ASSAY OF ALDOSTERONE: CPT | Mod: 90 | Performed by: PATHOLOGY

## 2022-10-04 PROCEDURE — 84443 ASSAY THYROID STIM HORMONE: CPT | Mod: 90 | Performed by: PATHOLOGY

## 2022-10-04 PROCEDURE — 84244 ASSAY OF RENIN: CPT | Mod: 90 | Performed by: PATHOLOGY

## 2022-10-04 PROCEDURE — 84439 ASSAY OF FREE THYROXINE: CPT | Mod: 90 | Performed by: PATHOLOGY

## 2022-10-04 PROCEDURE — 99215 OFFICE O/P EST HI 40 MIN: CPT | Performed by: PHYSICIAN ASSISTANT

## 2022-10-04 ASSESSMENT — PAIN SCALES - GENERAL: PAINLEVEL: NO PAIN (0)

## 2022-10-04 NOTE — LETTER
10/4/2022       RE: Loy Limon  [address]  [place] [address]     Dear Colleague,    Thank you for referring your patient, Loy Limon, to the [facility] [place] [facility] [place]. Please see a copy of my visit note below.               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  He has a history prostate cancer developed in 2019 and is status post RALP in 2020.      He has history of cirrhosis with HCC, portal HTN, latent TB and underwent living donor liver transplant on 12/22/2018. He developed steroid/immunosuppressant induced diabetes and was treated with NPH insulin, which was discontinued once he was no longer taking steroids.  He also has hypertension hyperlipidemia and a history of smoking.    He last saw Marcy in August 2021, he was taking Metformin 1000 mg twice daily, Januvia 25 mg daily, Lantus 10 units 2x daily. His average blood sugar was 257 without any hypoglycemia and a GMI of 9.5 we will increase his Lantus to 15 units in the morning and 15 in the evening and discussed how he might reduce his carbohydrate intake.    I met Jennie in Sept 2021:  A1C 7.9 on same meds x reduced Lantus to 10 units once daily. Discussed possible GLP-1 agonist or SGLT2-i in place of Januvia and Lantus.  Noted overweight.      The patient's risk factor profile is: (+) HTN, (+) diabetes, (+) hyperlipidemia, (+) prior tobacco use, (-) family Hx CAD.     February 2022: A1c had risen to 10 in January.  We stopped Januvia and we started Trulicity at 0.75 mg weekly.   Advised him to increase this to 1.5 mg and 1 month if tolerated.      Interim:  Mr. Limon did not arrive for appointment with [facility] last month to evaluate his persistent proteinuria.  He had follow-up in oncology clinic last month and he noted no evidence of metastatic carcinoma and advised repeat MRI in April 2020 the coming months as well as alpha-fetoprotein.  They also did order a PSA but advise he follow-up with urology.    Today:  Mr. Limon reports that he feels well he.  He has not been checking his blood sugar as his lancet does not work.   He has changed the needle but it still will not work and he has not been able to get a new one at his pharmacy as he understands he needs an order.  He denies any difficulties with symptoms of low or high blood sugar.  He reports that he continues to take his metformin and blood pressure medications.  He did not realize that he needs to take his thyroid medication fasting, but he does take it shortly after waking up and eats his breakfast about 15 minutes later.  He did not get glucose gel and he has not been taking Trulicity due to cost.  He has been enjoying the good weather and walking in the park most days.  He eats most of his meals at home, does not drink any soda avoids sweets and fast food.  He has fruits and vegetables most every day.    Meds : Amlodipine 5 mg  Losartan 50 mg petersen   Prednisone 5 mg  Levothyroixine 125 mcg  - taking fasting -eats 15 minutes later  Metfromin 500 mg ER 2 tabs bid      Loy's PMH, PSH and allergies were reviewed today and pertinent information is summarized above.  He lives with his wife Drea.  He does have children in the area but they generally they do not help him with getting medications appointments or technology.    Loy's pertinent social and family history are also reviewed today and pertinent information is summarized above.    Current Outpatient Medications   Medication     amLODIPine (NORVASC) 5 MG tablet     blood glucose (NO BRAND SPECIFIED) lancets standard     blood glucose (NO BRAND SPECIFIED) lancets standard     blood  "glucose (ONETOUCH VERIO IQ) test strip     blood glucose monitoring (NO BRAND SPECIFIED) meter device kit     insulin glargine (LANTUS PEN) 100 UNIT/ML pen     insulin pen needle (31G X 5 MM) 31G X 5 MM miscellaneous     Lancets (ONETOUCH DELICA PLUS [id number]) MISC     levothyroxine (SYNTHROID/LEVOTHROID) 125 MCG tablet     losartan (COZAAR) 50 MG tablet     mycophenolate (GENERIC EQUIVALENT) 250 MG capsule     predniSONE (DELTASONE) 5 MG tablet     atorvastatin (LIPITOR) 40 MG tablet     dulaglutide (TRULICITY) 0.75 MG/0.5ML pen     glucose 40 % (400 mg/mL) gel     insulin pen needle (BD KIRK U/F) 32G X 4 MM miscellaneous     metFORMIN (GLUCOPHAGE-XR) 500 MG 24 hr tablet     Metoprolol Tartrate 75 MG TABS     nitroGLYcerin (NITROSTAT) 0.4 MG sublingual tablet     Sharps Container MISC     ticagrelor (BRILINTA) 90 MG tablet     ursodiol (ACTIGALL) 250 MG tablet     Vitamin D3 (CHOLECALCIFEROL) 25 mcg (1000 units) tablet     No current facility-administered medications for this visit.         ROS:   Patient denies any fevers, chills or sweats as well as any changes in vision, problems with floaters or field cuts in vision, pain or problems with dentition, new or different headaches.  Patient denies symptoms of hypo and hyperglycemia except as above.   Patients denies marked fatigue, cough, shortness of breath, chest pain or pressure.  There has been no pain with or other changes in urination or  itching or pain in genital areas.  Patient denies any noted swelling in feet, ankles or otherwise, loss of sensation or pain  in feet or other areas.    Patient also denies current difficulties with depressed mood, anhedonia or worrying too much.        Exam:    BP (!) 148/73 (BP Location: Right arm, Patient Position: Sitting, Cuff Size: Adult Regular)   Pulse 50   Wt 90.2 kg (198 lb 12.8 oz)   BMI 31.13 kg/m      General: Pleasant, well nourished and hydrated male in NAD.   Psych:  Mood is \"good,\" affect is appropriate.  " Thought form and content are fluid and coherent.    HEENT: Eyes and sclera are clear. Extraocular movements are grossly intact without proptosis.  Nares are patent, mucous membranes moist.  Neck: No masses or JVD are noted.    Resp: Easy and unlabored breathing.   Neuro: Alert and oriented, communicating clearly.   Ext: no swelling or edema.  Good distal pulses and capillary refill in digits.  Skin in good condition.  Sensation is intact to monofilament testing bilaterally and throughout.    Data:      Recent Labs   Lab Test 10/04/22  1101 07/27/22  1344 07/27/22  1340 05/04/22  0920 03/02/22  1442 01/25/22  0858 01/25/22  0851 10/13/21  0905 09/13/21  0848 03/24/21  0812 02/12/21  0000 02/01/21  0723 09/25/20  0826   A1C  --   --  6.7*  --   --   --  10.0*  --  7.9*   < >  --   --   --    HEMOGLOBINA1  --   --   --   --   --   --   --   --   --   --  12.1*  --   --    TSH 5.43* 11.01*  --  1.55  --   --  12.29*  --  4.07*   < >  --   --   --    T4 1.54 0.85  --   --   --   --  1.00  --  1.00   < >  --   --   --    LDL  --   --   --   --   --   --  74  --  311*   < >  --   --   --    HDL  --   --   --   --   --   --  57  --  71   < >  --   --   --    TRIG  --   --   --   --   --   --  136  --  90   < >  --   --   --    CR  --  0.79  --  0.87   < >  --  0.80   < > 0.89   < >  --    < >  --    MICROL  --   --   --   --   --  5,750  --   --   --   --   --   --  764    < > = values in this interval not displayed.     Hemoglobin   Date Value Ref Range Status   07/27/2022 14.6 13.3 - 17.7 g/dL Final   07/07/2021 14.0 13.3 - 17.7 g/dL Final   ]    Most recent GFRs: Greater than 90 in July 2022.      Assessment/Plan:    Loy Limon is a 67 year old male with type 2 diabetes that was at goal when his A1c was last checked in July.    1 Diabetes -this well was well controlled when his A1c was last checked in July but today we have a paucity of data has not been checking his blood sugar due to not having any lancets.  He  also is no longer taking Trulicity.    I did prescribe a lancets and today he will resume checking his blood sugar he understands that the goal fasting blood sugar would be less than 140 and bedtime less than 200, he will return to clinic sooner if blood sugars are above this goal for his next A1c is greater than 7.    He has follow-up with Dr. Reyes later this month, prefers to return to clinic in 6 months.    2. Hypertension: Blood pressure remains above goal even after recheck today.  He will continue to take losartan with increase his amlodipine from 5 to 10 mg.  I urged him to reschedule his follow-up with nephrology and discussed the importance of keeping this appointment.  He also plans to see Dr. Reyes for follow-up later this month.      2. DM Complications-     Lifestyle modification focusing on application of a Mediterranean diet or Dietary Approaches to Stop Hypertension (DASH) dietary pattern; reduction of saturated fat and trans fat; increase of dietary n-3 fatty acids, viscous fiber, and plant stanols/sterols intake; and increased physical activity recommended to improve the lipid profile and reduce the risk of developing atherosclerotic cardiovascular disease.     Retinopathy:   No known retinopathy; he has eye exam scheduled tomorrow.  Nephropathy:   Has a history of proteinuria and elevated blood pressure, urged her to reschedule with nephrology asking staff to assist him with this.  Neuropathy: No.    Feet: OK, no ulcers or lesions.   Lipids:10-year atherosclerotic cardiovascular disease risk >20%.  Patient taking a statin.      50 minutes spent on the date of the encounter doing chart review, history and exam, documentation, education and counseling, as well as communication and coordination of care, and further activities as noted above.  This time excludes time spent reviewing CGM.      It is my privilege to be involved in the care of the above patient.     Leia Edward PA-C,  [facility]  [facility]  Diabetes, Endocrinology, and Metabolism  [phone] Appointments/Nurse  [phone] pager  [phone] nurse line    This note was completed in part using Dragon voice recognition, and may contain word and grammatical errors.

## 2022-10-04 NOTE — TELEPHONE ENCOUNTER
Call to Claribel w/ the assistance of a  # [id number] for annual chart review / check in.  He is currently sitting in his PCP office - he needs help getting a BP and Blood glucose monitor that will be covered by his insurance.  He is seeing Dr. Berumen and will ask about this.  I asked the  to assure that he is taking mycophenolate 750 bid and pred 5 daily.    I also reminded him that he should have lab testing every 3 mos.

## 2022-10-04 NOTE — PATIENT INSTRUCTIONS
Sigue samaniego buen trabajo manejando samaniego diabetes.    Vaya al laboratorio para chequer nikhil tiroides.      Si samaniego presion sanguina sigue alto, le voy a subir samaniego dosis de Amlodipine a 10 mg diarios (no mas 5).    Le vuelvo a caroline en 6 meses, terry aisha que chequea samaniego presion con Dr. Herrera o la de nefrologia  mas pronto.      My best wishes,    Leia Edward PA-C, MPAS  [facility]  Diabetes, Endocrinology, and Metabolism  [phone] Appointments/Nurse  [phone] Fax  [phone] URGENTafter hours/weekend Endocrinologist on call

## 2022-10-04 NOTE — NURSING NOTE
"Chief Complaint   Patient presents with     Diabetes     Vital signs:      BP: (!) 168/79 Pulse: 57             Weight: 90.2 kg (198 lb 12.8 oz)  Estimated body mass index is 31.13 kg/m  as calculated from the following:    Height as of 5/4/22: 1.702 m (5' 7.01\").    Weight as of this encounter: 90.2 kg (198 lb 12.8 oz).        "

## 2022-10-05 ENCOUNTER — OFFICE VISIT (OUTPATIENT)
Dept: OPHTHALMOLOGY | Facility: CLINIC | Age: 67
End: 2022-10-05
Attending: FAMILY MEDICINE
Payer: COMMERCIAL

## 2022-10-05 DIAGNOSIS — E11.9 TYPE 2 DIABETES MELLITUS WITHOUT RETINOPATHY (H): Primary | ICD-10-CM

## 2022-10-05 DIAGNOSIS — H25.13 SENILE NUCLEAR SCLEROSIS, BILATERAL: ICD-10-CM

## 2022-10-05 DIAGNOSIS — H52.4 PRESBYOPIA: ICD-10-CM

## 2022-10-05 PROCEDURE — 92014 COMPRE OPH EXAM EST PT 1/>: CPT | Performed by: OPTOMETRIST

## 2022-10-05 PROCEDURE — 92015 DETERMINE REFRACTIVE STATE: CPT | Performed by: OPTOMETRIST

## 2022-10-05 ASSESSMENT — REFRACTION_MANIFEST
OS_AXIS: 005
OD_SPHERE: +0.75
OD_CYLINDER: +0.50
OD_AXIS: 180
OS_SPHERE: +0.25
OD_ADD: +2.50
OS_ADD: +2.50
OS_CYLINDER: +0.75

## 2022-10-05 ASSESSMENT — CUP TO DISC RATIO
OS_RATIO: 0.6
OD_RATIO: 0.6

## 2022-10-05 ASSESSMENT — EXTERNAL EXAM - LEFT EYE: OS_EXAM: NORMAL

## 2022-10-05 ASSESSMENT — VISUAL ACUITY
OD_SC: 20/25
OD_SC+: -1
METHOD: SNELLEN - LINEAR
OS_SC+: -1
OS_SC: 20/25

## 2022-10-05 ASSESSMENT — TONOMETRY
IOP_METHOD: TONOPEN
OS_IOP_MMHG: 18
OD_IOP_MMHG: 19

## 2022-10-05 ASSESSMENT — EXTERNAL EXAM - RIGHT EYE: OD_EXAM: NORMAL

## 2022-10-05 ASSESSMENT — CONF VISUAL FIELD
OD_NORMAL: 1
OS_NORMAL: 1

## 2022-10-05 ASSESSMENT — SLIT LAMP EXAM - LIDS
COMMENTS: LAXITY
COMMENTS: LAXITY

## 2022-10-05 NOTE — PROGRESS NOTES
Assessment/Plan  (E11.9) Type 2 diabetes mellitus without retinopathy (H)  (primary encounter diagnosis)  Plan: Discussed findings with patient. Encouraged continued blood glucose, pressure, and lipid control. Patient should continue following recommendations of primary care provider. Patient should plan on returning to clinic annually for a dilated eye exam but was encouraged to return to clinic sooner with new flashes/floaters or other vision changes.     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     (H52.4) Presbyopia  Plan: REFRACTION [[id number]]        Discussed findings with patient. New spectacle prescription dispensed to patient. Patient is welcome to return to clinic with prolonged adaptation difficulties.       Complete documentation of historical and exam elements from today's encounter can  be found in the full encounter summary report (not reduplicated in this progress  note). I personally obtained the chief complaint(s) and history of present illness. I  confirmed and edited as necessary the review of systems, past medical/surgical  history, family history, social history, and examination findings as documented by  others; and I examined the patient myself. I personally reviewed the relevant tests,  images, and reports as documented above. I formulated and edited as necessary the  assessment and plan and discussed the findings and management plan with the  patient and family.    Jay Patel OD

## 2022-10-05 NOTE — NURSING NOTE
Chief Complaints and History of Present Illnesses   Patient presents with     Annual Eye Exam     Diabetic Eye Exam     Pt here for Annual diabetic eye exam with Diabetic exam.      Chief Complaint(s) and History of Present Illness(es)     Annual Eye Exam     Laterality: both eyes    Associated symptoms: Negative for flashes and floaters              Diabetic Eye Exam     Associated symptoms: Negative for blurred vision, headaches, flashes and floaters    Diabetes Type: Type 2    Comments: Pt here for Annual diabetic eye exam with Diabetic exam.               Comments     Pt is type 2 diabetic. Vision has decreased since last exam. Pt would like new glasses- has never worn them before. BS is not checked regularly- says his sensor is not work x3 months.   Lab Results       Component                Value               Date                       A1C                      6.7                 07/27/2022                 A1C                      10.0                01/25/2022                 A1C                      7.9                 09/13/2021                 A1C                      9.1                 07/07/2021                 A1C                      10.4                03/24/2021                 A1C                      7.2                 01/03/2020                 A1C                      7.3                 09/04/2019                 A1C                      8.5                 05/23/2019       Pt not using drops.   BK SULLIVAN, COA 9:14 AM October 5, 2022

## 2022-10-07 LAB — ALDOST SERPL-MCNC: 6.6 NG/DL (ref 0–31)

## 2022-10-11 LAB — RENIN PLAS-CCNC: 0.8 NG/ML/HR

## 2022-10-11 RX ORDER — AMLODIPINE BESYLATE 10 MG/1
10 TABLET ORAL DAILY
Qty: 90 TABLET | Refills: 3 | Status: SHIPPED | OUTPATIENT
Start: 2022-10-11 | End: 2022-10-21

## 2022-10-12 DIAGNOSIS — E03.8 OTHER SPECIFIED HYPOTHYROIDISM: ICD-10-CM

## 2022-10-12 LAB — ALDOST/RENIN PLAS-RTO: 8.3 {RATIO} (ref 0–25)

## 2022-10-12 RX ORDER — LEVOTHYROXINE SODIUM 137 UG/1
137 TABLET ORAL DAILY
Qty: 90 TABLET | Refills: 1 | Status: SHIPPED | OUTPATIENT
Start: 2022-10-12 | End: 2023-01-27

## 2022-10-17 ENCOUNTER — TELEPHONE (OUTPATIENT)
Dept: ENDOCRINOLOGY | Facility: CLINIC | Age: 67
End: 2022-10-17

## 2022-10-17 NOTE — TELEPHONE ENCOUNTER
Spoke w/ Pt Via Wolof speaking . Confirms understanding review and recommendation from Leia Edward. Will get medication from Pharmacy when he is feeling better.   Reports fever and fatigue. Laying down. Confirmed with patient that he should wait until he is symptom free before going out.   Maya Brown RN on 10/17/2022 at 9:11 AM       Leia Rivera, NAIN  [facility]  It does not appears that he is getting enough thyroid medication.  Please advise him I will increase dose from 125 to 137 mcg, but still important for him to take it first thing in the morning so it is 30 minutes before eating anything.

## 2022-10-21 ENCOUNTER — ANCILLARY PROCEDURE (OUTPATIENT)
Dept: MRI IMAGING | Facility: CLINIC | Age: 67
End: 2022-10-21
Attending: INTERNAL MEDICINE
Payer: COMMERCIAL

## 2022-10-21 ENCOUNTER — OFFICE VISIT (OUTPATIENT)
Dept: NEPHROLOGY | Facility: CLINIC | Age: 67
End: 2022-10-21
Payer: COMMERCIAL

## 2022-10-21 ENCOUNTER — LAB (OUTPATIENT)
Dept: LAB | Facility: CLINIC | Age: 67
End: 2022-10-21
Payer: COMMERCIAL

## 2022-10-21 VITALS
HEART RATE: 71 BPM | TEMPERATURE: 97.7 F | OXYGEN SATURATION: 99 % | DIASTOLIC BLOOD PRESSURE: 78 MMHG | WEIGHT: 192.2 LBS | SYSTOLIC BLOOD PRESSURE: 145 MMHG | BODY MASS INDEX: 30.1 KG/M2

## 2022-10-21 DIAGNOSIS — I10 HYPERTENSION, ESSENTIAL: ICD-10-CM

## 2022-10-21 DIAGNOSIS — R05.1 ACUTE COUGH: ICD-10-CM

## 2022-10-21 DIAGNOSIS — I10 ESSENTIAL HYPERTENSION: ICD-10-CM

## 2022-10-21 DIAGNOSIS — R80.1 PERSISTENT PROTEINURIA: ICD-10-CM

## 2022-10-21 DIAGNOSIS — Z94.4 LIVER TRANSPLANTED (H): ICD-10-CM

## 2022-10-21 DIAGNOSIS — E55.9 VITAMIN D DEFICIENCY: ICD-10-CM

## 2022-10-21 DIAGNOSIS — E11.9 DM TYPE 2, GOAL HBA1C 7%-8% (H): ICD-10-CM

## 2022-10-21 DIAGNOSIS — C61 PROSTATE CANCER (H): ICD-10-CM

## 2022-10-21 DIAGNOSIS — E09.9 DRUG-INDUCED DIABETES MELLITUS (H): ICD-10-CM

## 2022-10-21 DIAGNOSIS — Z94.4 LIVER REPLACED BY TRANSPLANT (H): ICD-10-CM

## 2022-10-21 DIAGNOSIS — R80.1 PERSISTENT PROTEINURIA: Primary | ICD-10-CM

## 2022-10-21 DIAGNOSIS — C22.0 HCC (HEPATOCELLULAR CARCINOMA) (H): ICD-10-CM

## 2022-10-21 LAB
ALBUMIN MFR UR ELPH: 261 MG/DL
ALBUMIN SERPL BCG-MCNC: 3.6 G/DL (ref 3.5–5.2)
ALP SERPL-CCNC: 256 U/L (ref 40–129)
ALT SERPL W P-5'-P-CCNC: 31 U/L (ref 10–50)
ANION GAP SERPL CALCULATED.3IONS-SCNC: 8 MMOL/L (ref 7–15)
AST SERPL W P-5'-P-CCNC: 23 U/L (ref 10–50)
BILIRUB DIRECT SERPL-MCNC: <0.2 MG/DL (ref 0–0.3)
BILIRUB SERPL-MCNC: 0.6 MG/DL
BUN SERPL-MCNC: 21.3 MG/DL (ref 8–23)
CALCIUM SERPL-MCNC: 9.3 MG/DL (ref 8.8–10.2)
CHLORIDE SERPL-SCNC: 101 MMOL/L (ref 98–107)
CREAT SERPL-MCNC: 0.83 MG/DL (ref 0.67–1.17)
CREAT UR-MCNC: 96.7 MG/DL
DEPRECATED HCO3 PLAS-SCNC: 27 MMOL/L (ref 22–29)
ERYTHROCYTE [DISTWIDTH] IN BLOOD BY AUTOMATED COUNT: 12.2 % (ref 10–15)
GFR SERPL CREATININE-BSD FRML MDRD: >90 ML/MIN/1.73M2
GLUCOSE SERPL-MCNC: 316 MG/DL (ref 70–99)
HBA1C MFR BLD: 11.1 %
HCT VFR BLD AUTO: 43.2 % (ref 40–53)
HGB BLD-MCNC: 14.1 G/DL (ref 13.3–17.7)
MCH RBC QN AUTO: 28.5 PG (ref 26.5–33)
MCHC RBC AUTO-ENTMCNC: 32.6 G/DL (ref 31.5–36.5)
MCV RBC AUTO: 87 FL (ref 78–100)
PHOSPHATE SERPL-MCNC: 2.7 MG/DL (ref 2.5–4.5)
PLATELET # BLD AUTO: 256 10E3/UL (ref 150–450)
POTASSIUM SERPL-SCNC: 4.6 MMOL/L (ref 3.4–5.3)
PROT SERPL-MCNC: 6.8 G/DL (ref 6.4–8.3)
PROT/CREAT 24H UR: 2.7 MG/MG CR (ref 0–0.2)
PSA SERPL-MCNC: <0.01 NG/ML (ref 0–4.5)
RBC # BLD AUTO: 4.94 10E6/UL (ref 4.4–5.9)
SODIUM SERPL-SCNC: 136 MMOL/L (ref 136–145)
WBC # BLD AUTO: 5.7 10E3/UL (ref 4–11)

## 2022-10-21 PROCEDURE — 99214 OFFICE O/P EST MOD 30 MIN: CPT

## 2022-10-21 PROCEDURE — A9585 GADOBUTROL INJECTION: HCPCS | Performed by: RADIOLOGY

## 2022-10-21 PROCEDURE — 80053 COMPREHEN METABOLIC PANEL: CPT | Performed by: PATHOLOGY

## 2022-10-21 PROCEDURE — 85027 COMPLETE CBC AUTOMATED: CPT | Performed by: PATHOLOGY

## 2022-10-21 PROCEDURE — 84100 ASSAY OF PHOSPHORUS: CPT | Performed by: PATHOLOGY

## 2022-10-21 PROCEDURE — G0463 HOSPITAL OUTPT CLINIC VISIT: HCPCS

## 2022-10-21 PROCEDURE — 36415 COLL VENOUS BLD VENIPUNCTURE: CPT | Performed by: PATHOLOGY

## 2022-10-21 PROCEDURE — 74183 MRI ABD W/O CNTR FLWD CNTR: CPT | Performed by: RADIOLOGY

## 2022-10-21 PROCEDURE — 84153 ASSAY OF PSA TOTAL: CPT | Mod: 90 | Performed by: PATHOLOGY

## 2022-10-21 PROCEDURE — 84156 ASSAY OF PROTEIN URINE: CPT | Performed by: PATHOLOGY

## 2022-10-21 PROCEDURE — 82248 BILIRUBIN DIRECT: CPT | Performed by: PATHOLOGY

## 2022-10-21 PROCEDURE — 83036 HEMOGLOBIN GLYCOSYLATED A1C: CPT | Mod: 90 | Performed by: PATHOLOGY

## 2022-10-21 PROCEDURE — 99000 SPECIMEN HANDLING OFFICE-LAB: CPT | Performed by: PATHOLOGY

## 2022-10-21 RX ORDER — GADOBUTROL 604.72 MG/ML
10 INJECTION INTRAVENOUS ONCE
Status: COMPLETED | OUTPATIENT
Start: 2022-10-21 | End: 2022-10-21

## 2022-10-21 RX ORDER — BLOOD-GLUCOSE METER
EACH MISCELLANEOUS
Qty: 1 KIT | Refills: 0 | Status: SHIPPED | OUTPATIENT
Start: 2022-10-21

## 2022-10-21 RX ORDER — URSODIOL 250 MG/1
250 TABLET, FILM COATED ORAL 2 TIMES DAILY
Qty: 180 TABLET | Refills: 3 | Status: SHIPPED | OUTPATIENT
Start: 2022-10-21 | End: 2023-07-20

## 2022-10-21 RX ORDER — LOSARTAN POTASSIUM 100 MG/1
100 TABLET ORAL DAILY
Qty: 90 TABLET | Refills: 3 | Status: SHIPPED | OUTPATIENT
Start: 2022-10-21 | End: 2023-09-18

## 2022-10-21 RX ORDER — METFORMIN HCL 500 MG
1000 TABLET, EXTENDED RELEASE 24 HR ORAL 2 TIMES DAILY WITH MEALS
Qty: 360 TABLET | Refills: 3 | COMMUNITY
Start: 2022-10-21 | End: 2023-03-09

## 2022-10-21 RX ORDER — GUAIFENESIN/DEXTROMETHORPHAN 100-10MG/5
10 SYRUP ORAL EVERY 4 HOURS PRN
Qty: 30 ML | Refills: 0 | Status: SHIPPED | OUTPATIENT
Start: 2022-10-21 | End: 2023-10-13

## 2022-10-21 RX ORDER — VITAMIN B COMPLEX
50 TABLET ORAL DAILY
Qty: 180 TABLET | Refills: 3 | COMMUNITY
Start: 2022-10-21 | End: 2023-03-29

## 2022-10-21 RX ORDER — AMLODIPINE BESYLATE 10 MG/1
5 TABLET ORAL DAILY
Qty: 90 TABLET | Refills: 3 | Status: SHIPPED | OUTPATIENT
Start: 2022-10-21 | End: 2022-10-21

## 2022-10-21 RX ORDER — AMLODIPINE BESYLATE 5 MG/1
5 TABLET ORAL DAILY
Qty: 90 TABLET | Refills: 3 | Status: SHIPPED | OUTPATIENT
Start: 2022-10-21 | End: 2022-10-28

## 2022-10-21 RX ADMIN — GADOBUTROL 8.5 ML: 604.72 INJECTION INTRAVENOUS at 17:19

## 2022-10-21 ASSESSMENT — PAIN SCALES - GENERAL: PAINLEVEL: MILD PAIN (2)

## 2022-10-21 NOTE — LETTER
10/21/2022       RE: Loy Limon  [id number]  [facility] [address]     Dear Colleague,    Thank you for referring your patient, Loy Limon, to the [facility] [place] [facility]. Please see a copy of my visit note below.    Nephrology Clinic Visit 10/21/22    Assessment and Plan:    1. Proteinuria - Unchanged in setting of poor glycemic control, elevated blood pressure and sub optimal ARB dosing. UPCR 2.7 g/gCr today, stable from 5/4/22 but down from 3.0 g/gCr ( 3/22)     - Current evaluation for proteinuria include: SPEP/immunofix/SFLC previously WNL, PLAR neg   - DM was diagnosed post transplant, but A1c today 11.1 %    - Blood pressure uncontrolled   - Does not use NSAIDs   - On Losartan but not at prescribed dose    - Creat 0.8   - Off CNI   - Reviewed with patient the gold standard for diagnosing his proteinuria would be renal bx. Have reviewed the procedure with patient. Would not proceed until his blood pressure is optimized, his ARB is maximized and his A1c is at goal.      2. HTN- Uncontrolled w/o edema. Clinic readings 144/79. HR 71. No home readings. Left his device in [place] but would like an Rx for a new device  Current regimen:    Amlodipine 5 mg every day   Losartan 50 mg every day ( was prescribed 100 mg )    Metoprolol 75 mg bid ( not taking)    - Increase Losartan to 100 mg every day    - Rx sent for b/p device    - Will likely need to restart Metoprolol but would switch to Toprol    3. DM2 - Uncontrolled. A1c 11.1%. Currently on Metformin 1000 mg bid. Not taking insulin or Trulicity    - Random BS today was 316   - Needs diabetes Ed for glucometer education. Will message PCP    4. Liver transplant IS: MMF    5. Electrolytes - No acute concerns. K 4.6 Na 136    6. Vit D def - Vit D level 12, PTH 83 ( 3/22), Calcium 9.3 ( albumin 3.6), Phos 2.67    - Resume Vit D 2000 U every day. Was not taking    7.  Disposition - RTC 2 months for follow up w/labs prior      Assessment and plan was discussed with patient and he voiced his understanding and agreement.    Reason for Visit:  Proteinuria    HPI:  Mr Limon is a 66 yo male with ETOH cirrhosis/HCC s/p Liver transplant , Prostate cancer , DM2, Proteinuria, HTN, present today for proteinuria f/u. Last seen in clinic by me on 22.     Creat 0.7 range    ROS:   Visit conducted with Interpretor who became so frustrated with patient's inconsistencies and lack of understanding that she terminated the visit  Patient brings in his medications and list updated  Patient requests an RX for b/p machine and glucometer and admits he doesn't know how to check his BS in order to take insulin. When we went through his med list he denied having insulin but at previous visit reported that he used insulin if his blood sugar was elevated  Has not been taking prescribed Losartan ( only 50 mg instead of 100 mg) and has not been taking Metoprolol  His only physical complaint is cough x 2 wks. He has not had a covid test  Denies CP, abdominal concerns  No voiding concerns other than frequency  Energy level and appetite are good. 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    Chronic Health Problems:    ETOH cirrhosis/HCC s/p Liver transplant 2018  Prostate cancer   DM2  Proteinuria  HTN  Immunosuppressed state  Afib with RVR  Hypothyroidism  Tobacco use d/o  Alcohol use d/o ( in remission)  TB ( latent, treated)  HLD    Family Hx:   Family History   Problem Relation Age of Onset     Memory loss Mother      Liver Disease Brother      Skin Cancer No family hx of      Melanoma No family hx of      Personal Hx:   Social History     Tobacco Use     Smoking status: Former     Packs/day: 0.03     Years: 20.00     Pack years: 0.60     Types: Cigarettes, Cigars     Start date: 1970     Quit date: 3/14/2018     Years since quittin.6     Smokeless tobacco: Never      Tobacco comments:     Quit smoking Feb 2018, one cigarette after dinner   Substance Use Topics     Alcohol use: No     Comment: Quit drinking Feb 2018       Allergies:  No Known Allergies    Medications:  Current Outpatient Medications   Medication Sig     amLODIPine (NORVASC) 10 MG tablet Take 0.5 tablets (5 mg) by mouth daily For elevated blood pressure.     atorvastatin (LIPITOR) 40 MG tablet Take 1 tablet (40 mg) by mouth daily     blood glucose (NO BRAND SPECIFIED) lancets standard Use to test blood sugar 2 times daily or as directed.     blood glucose (NO BRAND SPECIFIED) test strip Use to test blood sugar 2 times daily or as directed. One touch ultra test strips     blood glucose monitoring (ONE TOUCH ULTRA 2) meter device kit Use to test blood sugar 2 times daily or as directed.     Blood Pressure Monitor KIT Automatic Blood Pressure Monitor     guaiFENesin-dextromethorphan (ROBITUSSIN DM) 100-10 MG/5ML syrup Take 10 mLs by mouth every 4 hours as needed for cough     levothyroxine (SYNTHROID/LEVOTHROID) 137 MCG tablet Take 1 tablet (137 mcg) by mouth daily     losartan (COZAAR) 100 MG tablet Take 1 tablet (100 mg) by mouth daily     metFORMIN (GLUCOPHAGE XR) 500 MG 24 hr tablet Take 2 tablets (1,000 mg) by mouth 2 times daily (with meals)     mycophenolate (GENERIC EQUIVALENT) 250 MG capsule TAKE 3 CAPSULES(750 MG) BY MOUTH TWICE DAILY     predniSONE (DELTASONE) 5 MG tablet Take 1 tablet (5 mg) by mouth daily     ursodiol (ACTIGALL) 250 MG tablet Take 1 tablet (250 mg) by mouth 2 times daily     Vitamin D3 (CHOLECALCIFEROL) 25 mcg (1000 units) tablet Take 2 tablets (50 mcg) by mouth daily     nitroGLYcerin (NITROSTAT) 0.4 MG sublingual tablet For chest pain place 1 tablet under the tongue every 5 minutes for 3 doses. If symptoms persist 5 minutes after 1st dose call 911. (Patient not taking: Reported on 10/4/2022)     No current facility-administered medications for this visit.      Vitals:  BP (!)  145/78   Pulse 71   Temp 97.7  F (36.5  C) (Oral)   Wt 87.2 kg (192 lb 3.2 oz)   SpO2 99%   BMI 30.10 kg/m      Exam:  GEN: Pleasant male in NAD. Unaccompanied  CARDIAC: RRR  LUNGS: Diminished  ABDOMEN: Soft, Obese  EXT: no edema  NEURO: A/O    LABS:   CMP  Recent Labs   Lab Test 10/21/22  1019 10/04/22  1101 07/27/22  1344 05/04/22  0920 03/23/22  0720 07/21/21  1804 07/07/21  1059 04/23/21  0755 03/24/21  0812 02/01/21  0723     --  138 141 142   < > 138 138 140 136   POTASSIUM 4.6 5.2 4.5 5.1 4.2   < > 4.7 4.9 4.5 4.5   CHLORIDE 101  --  106 110* 108   < > 106 108 106 104   CO2 27  --  26 26 27   < > 25 26 26 28   ANIONGAP 8  --  6 5 7   < > 6 4 7 5   *  --  128* 138* 148*   < > 202* 200* 241* 296*   BUN 21.3  --  19 22 17   < > 26 16 24 23   CR 0.83  --  0.79 0.87 0.91   < > 0.79 0.84 0.86 0.75   GFRESTIMATED >90  --  >90 >90 >90   < > >90 >90 89 >90   GFRESTBLACK  --   --   --   --   --   --  >90 >90 >90 >90   YELENA 9.3  --  8.5 8.4* 8.2*   < > 8.3* 8.3* 8.9 8.9    < > = values in this interval not displayed.     Recent Labs   Lab Test 10/21/22  1019 07/27/22  1344 05/04/22  0920 03/23/22  0720   BILITOTAL 0.6 0.9 0.6 0.6   ALKPHOS 256* 125 158* 223*   ALT 31 27 27 29   AST 23 17 24 19     CBC  Recent Labs   Lab Test 10/21/22  1019 07/27/22  1344 05/04/22  0920 03/23/22  0720   HGB 14.1 14.6 13.9 13.9   WBC 5.7 7.1 6.0 6.3   RBC 4.94 5.00 4.92 4.96   HCT 43.2 43.8 43.3 42.3   MCV 87 88 88 85   MCH 28.5 29.2 28.3 28.0   MCHC 32.6 33.3 32.1 32.9   RDW 12.2 12.6 12.8 12.6    209 218 234     URINE STUDIES  Recent Labs   Lab Test 03/02/22  1445 08/30/21  0943 07/21/21  1806 06/09/21  0945   COLOR Yellow Yellow Yellow Yellow   APPEARANCE Clear Clear Clear Clear   URINEGLC Negative Negative 50* 150*   URINEBILI Negative Negative Negative Negative   URINEKETONE Negative Negative Negative Negative   SG 1.020 1.017 1.018 1.018   UBLD Trace* Small* Negative Negative   URINEPH 6.0 5.0 5.0 5.0    PROTEIN >2000* 100* >499* 100*   NITRITE Negative Negative Negative Negative   LEUKEST Negative Negative Negative Negative   RBCU 2 3* 2 1   WBCU 1 1 1 1     Recent Labs   Lab Test 05/04/22  0930 03/02/22  1445 08/30/21  0943 07/21/21  1806   UTPG 2.75* 3.04* 2.83* 1.40*     PTH  Recent Labs   Lab Test 03/02/22  1442   PTHI 83*     IRON STUDIES  Recent Labs   Lab Test 07/19/18  1132   IRON 141   *   IRONSAT 68*       Jacqueline Juarez, NP

## 2022-10-21 NOTE — NURSING NOTE
Chief Complaint   Patient presents with     RECHECK     3 mo f/u       BP (!) 145/78   Pulse 71   Temp 97.7  F (36.5  C) (Oral)   Wt 87.2 kg (192 lb 3.2 oz)   SpO2 99%   BMI 30.10 kg/m      Panfilo Koenig on 10/21/2022 at 11:07 AM

## 2022-10-21 NOTE — DISCHARGE INSTRUCTIONS
MRI Contrast Discharge Instructions    The IV contrast you received today will pass out of your body in your  urine. This will happen in the next 24 hours. You will not feel this process.  Your urine will not change color.    Drink at least 4 extra glasses of water or juice today (unless your doctor  has restricted your fluids). This reduces the stress on your kidneys.  You may take your regular medicines.    If you are on dialysis: It is best to have dialysis today.    If you have a reaction: Most reactions happen right away. If you have  any new symptoms after leaving the hospital (such as hives or swelling),  call your hospital at the correct number below. Or call your family doctor.  If you have breathing distress or wheezing, call 911.    Special instructions: ***    I have read and understand the above information.    Signature:______________________________________ Date:___________    Staff:__________________________________________ Date:___________     Time:__________    [facility] Departments:    ___[place]: [phone]  ___[facility]: [phone]  ___[place]: [phone] ___[facility]: [phone]  ___[facility]: [phone]  ___[facility]: [phone]  ___[phone]  ___[facility]: [phone]  ___[phone]

## 2022-10-22 NOTE — PROGRESS NOTES
Nephrology Clinic Visit 10/21/22    Assessment and Plan:    1. Proteinuria - Unchanged in setting of poor glycemic control, elevated blood pressure and sub optimal ARB dosing. UPCR 2.7 g/gCr today, stable from 5/4/22 but down from 3.0 g/gCr ( 3/22)     - Current evaluation for proteinuria include: SPEP/immunofix/SFLC previously WNL, PLAR neg   - DM was diagnosed post transplant, but A1c today 11.1 %    - Blood pressure uncontrolled   - Does not use NSAIDs   - On Losartan but not at prescribed dose    - Creat 0.8   - Off CNI   - Reviewed with patient the gold standard for diagnosing his proteinuria would be renal bx. Have reviewed the procedure with patient. Would not proceed until his blood pressure is optimized, his ARB is maximized and his A1c is at goal.      2. HTN- Uncontrolled w/o edema. Clinic readings 144/79. HR 71. No home readings. Left his device in [place] but would like an Rx for a new device  Current regimen:    Amlodipine 5 mg every day   Losartan 50 mg every day ( was prescribed 100 mg )    Metoprolol 75 mg bid ( not taking)    - Increase Losartan to 100 mg every day    - Rx sent for b/p device    - Will likely need to restart Metoprolol but would switch to Toprol    3. DM2 - Uncontrolled. A1c 11.1%. Currently on Metformin 1000 mg bid. Not taking insulin or Trulicity    - Random BS today was 316   - Needs diabetes Ed for glucometer education. Will message PCP    4. Liver transplant IS: MMF    5. Electrolytes - No acute concerns. K 4.6 Na 136    6. Vit D def - Vit D level 12, PTH 83 ( 3/22), Calcium 9.3 ( albumin 3.6), Phos 2.67    - Resume Vit D 2000 U every day. Was not taking    7. Disposition - RTC 2 months for follow up w/labs prior      Assessment and plan was discussed with patient and he voiced his understanding and agreement.    Reason for Visit:  Proteinuria    HPI:  Mr Limon is a 66 yo male with ETOH cirrhosis/HCC s/p Liver transplant 2018, Prostate cancer 4/19, DM2, Proteinuria, HTN,  present today for proteinuria f/u. Last seen in clinic by me on 22.     Creat 0.7 range    ROS:   Visit conducted with Interpretor who became so frustrated with patient's inconsistencies and lack of understanding that she terminated the visit  Patient brings in his medications and list updated  Patient requests an RX for b/p machine and glucometer and admits he doesn't know how to check his BS in order to take insulin. When we went through his med list he denied having insulin but at previous visit reported that he used insulin if his blood sugar was elevated  Has not been taking prescribed Losartan ( only 50 mg instead of 100 mg) and has not been taking Metoprolol  His only physical complaint is cough x 2 wks. He has not had a covid test  Denies CP, abdominal concerns  No voiding concerns other than frequency  Energy level and appetite are good. 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    Chronic Health Problems:    ETOH cirrhosis/HCC s/p Liver transplant   Prostate cancer   DM2  Proteinuria  HTN  Immunosuppressed state  Afib with RVR  Hypothyroidism  Tobacco use d/o  Alcohol use d/o ( in remission)  TB ( latent, treated)  HLD    Family Hx:   Family History   Problem Relation Age of Onset     Memory loss Mother      Liver Disease Brother      Skin Cancer No family hx of      Melanoma No family hx of      Personal Hx:   Social History     Tobacco Use     Smoking status: Former     Packs/day: 0.03     Years: 20.00     Pack years: 0.60     Types: Cigarettes, Cigars     Start date: 1970     Quit date: 3/14/2018     Years since quittin.6     Smokeless tobacco: Never     Tobacco comments:     Quit smoking 2018, one cigarette after dinner   Substance Use Topics     Alcohol use: No     Comment: Quit drinking 2018       Allergies:  No Known Allergies    Medications:  Current Outpatient Medications   Medication Sig     amLODIPine (NORVASC) 10 MG tablet Take 0.5 tablets (5 mg)  by mouth daily For elevated blood pressure.     atorvastatin (LIPITOR) 40 MG tablet Take 1 tablet (40 mg) by mouth daily     blood glucose (NO BRAND SPECIFIED) lancets standard Use to test blood sugar 2 times daily or as directed.     blood glucose (NO BRAND SPECIFIED) test strip Use to test blood sugar 2 times daily or as directed. One touch ultra test strips     blood glucose monitoring (ONE TOUCH ULTRA 2) meter device kit Use to test blood sugar 2 times daily or as directed.     Blood Pressure Monitor KIT Automatic Blood Pressure Monitor     guaiFENesin-dextromethorphan (ROBITUSSIN DM) 100-10 MG/5ML syrup Take 10 mLs by mouth every 4 hours as needed for cough     levothyroxine (SYNTHROID/LEVOTHROID) 137 MCG tablet Take 1 tablet (137 mcg) by mouth daily     losartan (COZAAR) 100 MG tablet Take 1 tablet (100 mg) by mouth daily     metFORMIN (GLUCOPHAGE XR) 500 MG 24 hr tablet Take 2 tablets (1,000 mg) by mouth 2 times daily (with meals)     mycophenolate (GENERIC EQUIVALENT) 250 MG capsule TAKE 3 CAPSULES(750 MG) BY MOUTH TWICE DAILY     predniSONE (DELTASONE) 5 MG tablet Take 1 tablet (5 mg) by mouth daily     ursodiol (ACTIGALL) 250 MG tablet Take 1 tablet (250 mg) by mouth 2 times daily     Vitamin D3 (CHOLECALCIFEROL) 25 mcg (1000 units) tablet Take 2 tablets (50 mcg) by mouth daily     nitroGLYcerin (NITROSTAT) 0.4 MG sublingual tablet For chest pain place 1 tablet under the tongue every 5 minutes for 3 doses. If symptoms persist 5 minutes after 1st dose call 911. (Patient not taking: Reported on 10/4/2022)     No current facility-administered medications for this visit.      Vitals:  BP (!) 145/78   Pulse 71   Temp 97.7  F (36.5  C) (Oral)   Wt 87.2 kg (192 lb 3.2 oz)   SpO2 99%   BMI 30.10 kg/m      Exam:  GEN: Pleasant male in NAD. Unaccompanied  CARDIAC: RRR  LUNGS: Diminished  ABDOMEN: Soft, Obese  EXT: no edema  NEURO: A/O    LABS:   CMP  Recent Labs   Lab Test 10/21/22  1019 10/04/22  1101  07/27/22  1344 05/04/22  0920 03/23/22  0720 07/21/21  1804 07/07/21  1059 04/23/21  0755 03/24/21  0812 02/01/21  0723     --  138 141 142   < > 138 138 140 136   POTASSIUM 4.6 5.2 4.5 5.1 4.2   < > 4.7 4.9 4.5 4.5   CHLORIDE 101  --  106 110* 108   < > 106 108 106 104   CO2 27  --  26 26 27   < > 25 26 26 28   ANIONGAP 8  --  6 5 7   < > 6 4 7 5   *  --  128* 138* 148*   < > 202* 200* 241* 296*   BUN 21.3  --  19 22 17   < > 26 16 24 23   CR 0.83  --  0.79 0.87 0.91   < > 0.79 0.84 0.86 0.75   GFRESTIMATED >90  --  >90 >90 >90   < > >90 >90 89 >90   GFRESTBLACK  --   --   --   --   --   --  >90 >90 >90 >90   YELENA 9.3  --  8.5 8.4* 8.2*   < > 8.3* 8.3* 8.9 8.9    < > = values in this interval not displayed.     Recent Labs   Lab Test 10/21/22  1019 07/27/22  1344 05/04/22  0920 03/23/22  0720   BILITOTAL 0.6 0.9 0.6 0.6   ALKPHOS 256* 125 158* 223*   ALT 31 27 27 29   AST 23 17 24 19     CBC  Recent Labs   Lab Test 10/21/22  1019 07/27/22  1344 05/04/22  0920 03/23/22  0720   HGB 14.1 14.6 13.9 13.9   WBC 5.7 7.1 6.0 6.3   RBC 4.94 5.00 4.92 4.96   HCT 43.2 43.8 43.3 42.3   MCV 87 88 88 85   MCH 28.5 29.2 28.3 28.0   MCHC 32.6 33.3 32.1 32.9   RDW 12.2 12.6 12.8 12.6    209 218 234     URINE STUDIES  Recent Labs   Lab Test 03/02/22  1445 08/30/21  0943 07/21/21  1806 06/09/21  0945   COLOR Yellow Yellow Yellow Yellow   APPEARANCE Clear Clear Clear Clear   URINEGLC Negative Negative 50* 150*   URINEBILI Negative Negative Negative Negative   URINEKETONE Negative Negative Negative Negative   SG 1.020 1.017 1.018 1.018   UBLD Trace* Small* Negative Negative   URINEPH 6.0 5.0 5.0 5.0   PROTEIN >2000* 100* >499* 100*   NITRITE Negative Negative Negative Negative   LEUKEST Negative Negative Negative Negative   RBCU 2 3* 2 1   WBCU 1 1 1 1     Recent Labs   Lab Test 05/04/22  0930 03/02/22  1445 08/30/21  0943 07/21/21  1806   UTPG 2.75* 3.04* 2.83* 1.40*     PTH  Recent Labs   Lab Test 03/02/22  1442    PTHI 83*     IRON STUDIES  Recent Labs   Lab Test 07/19/18  1132   IRON 141   *   IRONSAT 68*       Jacqueline Juarez, NP

## 2022-10-25 RX ORDER — DULAGLUTIDE 1.5 MG/.5ML
1.5 INJECTION, SOLUTION SUBCUTANEOUS
Qty: 6 ML | Refills: 1 | Status: SHIPPED | OUTPATIENT
Start: 2022-10-25 | End: 2022-10-27

## 2022-10-26 ENCOUNTER — OFFICE VISIT (OUTPATIENT)
Dept: FAMILY MEDICINE | Facility: CLINIC | Age: 67
End: 2022-10-26
Payer: COMMERCIAL

## 2022-10-26 VITALS
HEIGHT: 67 IN | WEIGHT: 195 LBS | BODY MASS INDEX: 30.61 KG/M2 | HEART RATE: 74 BPM | SYSTOLIC BLOOD PRESSURE: 145 MMHG | OXYGEN SATURATION: 96 % | DIASTOLIC BLOOD PRESSURE: 83 MMHG

## 2022-10-26 DIAGNOSIS — Z00.00 HEALTH CARE MAINTENANCE: Primary | ICD-10-CM

## 2022-10-26 DIAGNOSIS — Z23 ENCOUNTER FOR IMMUNIZATION: ICD-10-CM

## 2022-10-26 DIAGNOSIS — E11.65 UNCONTROLLED TYPE 2 DIABETES MELLITUS WITH HYPERGLYCEMIA (H): ICD-10-CM

## 2022-10-26 PROCEDURE — 0124A COVID-19,PF,PFIZER BOOSTER BIVALENT: CPT | Performed by: FAMILY MEDICINE

## 2022-10-26 PROCEDURE — 91312 COVID-19,PF,PFIZER BOOSTER BIVALENT: CPT | Performed by: FAMILY MEDICINE

## 2022-10-26 PROCEDURE — 99213 OFFICE O/P EST LOW 20 MIN: CPT | Mod: 25 | Performed by: FAMILY MEDICINE

## 2022-10-26 PROCEDURE — 90662 IIV NO PRSV INCREASED AG IM: CPT | Performed by: FAMILY MEDICINE

## 2022-10-26 PROCEDURE — G0008 ADMIN INFLUENZA VIRUS VAC: HCPCS | Performed by: FAMILY MEDICINE

## 2022-10-26 NOTE — PROGRESS NOTES
"    Assessment & Plan     Health care maintenance  Due  - FLU VACCINE HIGH DOSE PRESERVATIVE FREE, AGE =>65 YR  - COVID-19,PF,PFIZER BOOSTER BIVALENT 12+Yrs    Encounter for immunization  Due  - FLU VACCINE HIGH DOSE PRESERVATIVE FREE, AGE =>63 YR    Uncontrolled type 2 diabetes mellitus with hyperglycemia (H)  Discussed labs, meds, he will stop at pharm today and let us know if any cost problems, my understanding is his DM meds are free for him      Review of external notes as documented elsewhere in note  22 minutes spent on the date of the encounter doing chart review, history and exam, documentation and further activities per the note, rvwd renal/hepatology/oncology/endo/eye notes, via interpteret    BMI:   Estimated body mass index is 30.53 kg/m  as calculated from the following:    Height as of this encounter: 1.702 m (5' 7.01\").    Weight as of this encounter: 88.5 kg (195 lb).         No follow-ups on file.    Jaswinder Anne MD  [facility] is a 67 year old, presenting for the following health issues:  Follow Up (PT here for three month follow up/ )      HPI Here in f/u  W/   No acute c/o  I'd been on message chain of his providers that he'd thought he cannot afford his DM meds (trulicity, insulin), last SW message said they'd be free, I discuss this w/ him in viist and encourage him to stop at pharmacy today  He said he would  Due for flu/covid shots, he's ok with it  2018 colonoscopy wnl in care ev rvwd    Interval and upcoming MD visits and labs rvwd, no further labs needed by me    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/28/2018     BPH (benign prostatic hyperplasia)      Cancer (H)     hepatocellualr carcinoma     Cirrhosis of liver (H) 05/08/2018     Diabetes (H)      Enlarged prostate      Hypothyroidism      Impotence of organic origin 09/25/2020     Inguinal " hernia     Repaired with mesh on 18     Liver lesion 2018     Liver transplanted (H) 2018     donor liver transplant     Portal vein thrombosis     on path explant     Postoperative atrial fibrillation (H) 2018     Prostate cancer (H)      TB lung, latent     Treated      Past Surgical History:   Procedure Laterality Date     APPENDECTOMY       COLONOSCOPY           CV CORONARY ANGIOGRAM N/A 10/13/2021    Procedure: CV CORONARY ANGIOGRAM;  Surgeon: Yaya Hampton MD;  Location:  HEART CARDIAC CATH LAB     CV CORONARY LITHOTRIPSY PCI N/A 10/13/2021    Procedure: CV Coronary Lithotripsy PCI;  Surgeon: Yaya Hampton MD;  Location:  HEART CARDI[facility] CATH LAB     CV INSTANTANEOUS WAVE-FREE RATIO N/A 10/13/2021    Procedure: Instantaneous Wave-Free Ratio;  Surgeon: Yaya Hampton MD;  Location:  HEART CARDIAC CATH LAB     CV INTRAVASULAR ULTRASOUND N/A 10/13/2021    Procedure: Intravascular Ultrasound;  Surgeon: Yaya Hampton MD;  Location:  HEART CARDIAC CATH LAB     CV PCI STENT DRUG ELUTING N/A 10/13/2021    Procedure: Percutaneous Coronary Intervention Stent Drug Eluting;  Surgeon: Yaya Hampton MD;  Location:  HEART CARDIAC CATH LAB     DAVINCI PROSTATECTOMY N/A 1/3/2020    Procedure: Robot-assisted laparoscopic radical prostatectomy, Bladder biopsy;  Surgeon: Kenrick Casiano MD;  [facility]     ENDOSCOPIC RETROGRADE CHOLANGIOPANCREATOGRAM N/A 2018    Procedure: ENDOSCOPIC RETROGRADE CHOLANGIOPANCREATOGRAM, with Billary Sphincterotomy and Billary Stent Placement;  Surgeon: Omero Lawler MD;  [facility]     ENDOSCOPIC RETROGRADE CHOLANGIOPANCREATOGRAM  2019    Procedure: COMBINED ENDOSCOPIC RETROGRADE CHOLANGIOPANCREATOGRAPHY, Bile Duct Stent Exchange;  Surgeon: Omero Lawler MD;  [facility]     ENDOSCOPIC RETROGRADE CHOLANGIOPANCREATOGRAM  "N/A 2019    Procedure: ENDOSCOPIC RETROGRADE CHOLANGIOPANCREATOGRAPHY, WITH BILIARY STENT REMOVAL AND STONE EXTRACTION;  Surgeon: Omero Lawler MD;  [facility]     HERNIORRHAPHY INGUINAL  2018    Procedure: Herniorrhaphy inguinal;  Surgeon: Emil Echevarria MD;  [facility]     IR LIVER BIOPSY PERCUTANEOUS  2018     LAPAROTOMY EXPLORATORY      per the patient \"for infection in the LLQ\"      TRANSPLANT LIVER RECIPIENT  DONOR N/A 2018    Procedure: TRANSPLANT LIVER RECIPIENT  DONOR;  Surgeon: Emil Echevarria MD;  [facility]     Current Outpatient Medications   Medication     amLODIPine (NORVASC) 5 MG tablet     atorvastatin (LIPITOR) 40 MG tablet     blood glucose (NO BRAND SPECIFIED) lancets standard     blood glucose (NO BRAND SPECIFIED) test strip     blood glucose monitoring (ONE TOUCH ULTRA 2) meter device kit     Blood Pressure Monitor KIT     dulaglutide (TRULICITY) 1.5 MG/0.5ML pen     guaiFENesin-dextromethorphan (ROBITUSSIN DM) 100-10 MG/5ML syrup     insulin glargine (LANTUS PEN) 100 UNIT/ML pen     levothyroxine (SYNTHROID/LEVOTHROID) 137 MCG tablet     losartan (COZAAR) 100 MG tablet     metFORMIN (GLUCOPHAGE XR) 500 MG 24 hr tablet     mycophenolate (GENERIC EQUIVALENT) 250 MG capsule     nitroGLYcerin (NITROSTAT) 0.4 MG sublingual tablet     predniSONE (DELTASONE) 5 MG tablet     ursodiol (ACTIGALL) 250 MG tablet     Vitamin D3 (CHOLECALCIFEROL) 25 mcg (1000 units) tablet     No current facility-administered medications for this visit.     No Known Allergies        Review of Systems         Objective    BP (!) 147/76 (BP Location: Right arm, Patient Position: Sitting, Cuff Size: Adult Regular)   Pulse 74   Ht 1.702 m (5' 7.01\")   Wt 88.5 kg (195 lb)   SpO2 96%   BMI 30.53 kg/m    Body mass index is 30.53 kg/m .  Physical Exam   GENERAL: healthy, alert and no distress  MS: no gross musculoskeletal defects noted, no " edema

## 2022-10-26 NOTE — NURSING NOTE
Loy Limon is a 67 year old male that presents in clinic today for the following:     Chief Complaint   Patient presents with     Follow Up     PT here for three month follow up/        The patient's allergies and medications were reviewed. The patient's vitals were obtained without incident. The patient does not have any other questions for the provider.     Rola Laura, EMT at 12:31 PM on 10/26/2022.  Primary Care Clinic: [phone]

## 2022-10-27 ENCOUNTER — TELEPHONE (OUTPATIENT)
Dept: TRANSPLANT | Facility: CLINIC | Age: 67
End: 2022-10-27

## 2022-10-27 DIAGNOSIS — E11.9 DM TYPE 2, GOAL HBA1C 7%-8% (H): ICD-10-CM

## 2022-10-27 PROCEDURE — 99207 PR NO BILLABLE SERVICE THIS VISIT: CPT | Performed by: PHYSICIAN ASSISTANT

## 2022-10-27 RX ORDER — LANCETS
EACH MISCELLANEOUS
Qty: 100 EACH | Refills: 6 | Status: SHIPPED | OUTPATIENT
Start: 2022-10-27

## 2022-10-27 RX ORDER — LANCETS 21 GAUGE
EACH MISCELLANEOUS
Qty: 1 EACH | Refills: 0 | Status: SHIPPED | OUTPATIENT
Start: 2022-10-27

## 2022-10-27 RX ORDER — DULAGLUTIDE 1.5 MG/.5ML
1.5 INJECTION, SOLUTION SUBCUTANEOUS
Qty: 6 ML | Refills: 1 | Status: SHIPPED | OUTPATIENT
Start: 2022-10-27 | End: 2023-08-08 | Stop reason: SINTOL

## 2022-10-27 NOTE — PROGRESS NOTES
Spoke with Bubba Limon,  He went to [facility] pharmacy.  Will reroute prescriptions there.

## 2022-10-27 NOTE — TELEPHONE ENCOUNTER
Spoke to pt through interpretor Ely, who states that the pharmacy will not give pt Trulicity and has no way of checking his blood sugars. Writer stated that the medication was precribed by the PCP.  Writer will send the PCP a message to have staff contact pt. Also suggested that pt try calling there office as well. Pt understood and will follow through.

## 2022-10-28 ENCOUNTER — OFFICE VISIT (OUTPATIENT)
Dept: CARDIOLOGY | Facility: CLINIC | Age: 67
End: 2022-10-28
Attending: STUDENT IN AN ORGANIZED HEALTH CARE EDUCATION/TRAINING PROGRAM
Payer: COMMERCIAL

## 2022-10-28 ENCOUNTER — PATIENT OUTREACH (OUTPATIENT)
Dept: CARE COORDINATION | Facility: CLINIC | Age: 67
End: 2022-10-28

## 2022-10-28 VITALS
SYSTOLIC BLOOD PRESSURE: 163 MMHG | WEIGHT: 197 LBS | HEART RATE: 75 BPM | OXYGEN SATURATION: 98 % | DIASTOLIC BLOOD PRESSURE: 89 MMHG | BODY MASS INDEX: 29.86 KG/M2 | HEIGHT: 68 IN

## 2022-10-28 DIAGNOSIS — I25.118 CORONARY ARTERY DISEASE OF NATIVE ARTERY OF NATIVE HEART WITH STABLE ANGINA PECTORIS (H): Primary | ICD-10-CM

## 2022-10-28 DIAGNOSIS — I10 ESSENTIAL HYPERTENSION: ICD-10-CM

## 2022-10-28 PROCEDURE — G0463 HOSPITAL OUTPT CLINIC VISIT: HCPCS

## 2022-10-28 PROCEDURE — 99215 OFFICE O/P EST HI 40 MIN: CPT | Performed by: STUDENT IN AN ORGANIZED HEALTH CARE EDUCATION/TRAINING PROGRAM

## 2022-10-28 RX ORDER — CHLORTHALIDONE 25 MG/1
12.5 TABLET ORAL DAILY
Qty: 90 TABLET | Refills: 1 | Status: SHIPPED | OUTPATIENT
Start: 2022-10-28 | End: 2023-03-20

## 2022-10-28 RX ORDER — AMLODIPINE BESYLATE 5 MG/1
5 TABLET ORAL DAILY
Qty: 90 TABLET | Refills: 3 | Status: SHIPPED | OUTPATIENT
Start: 2022-10-28 | End: 2023-03-20

## 2022-10-28 ASSESSMENT — PAIN SCALES - GENERAL: PAINLEVEL: NO PAIN (0)

## 2022-10-28 NOTE — PROGRESS NOTES
Social Work Intervention  [facility]    Data/Intervention:    Patient Name:  Loy Limon  /Age:  1955 (67 year old)    Visit Type: telephone  Referral Source: Sweetie CAMEJO  Reason for Referral:  Pt states he can't afford his insulin    Collaborated With:    -Sweetie CAMEJO  -Loy- with     Psychosocial Information/Concerns:  Pt has had difficulty getting his insulin refilled. Details are unclear. I contacted the [facility] pharmacy and they report that he has no copay for lantus and trulicity and that they are ready to be picked up.     Intervention/Education/Resources Provided:  I contacted the Pt to relay the above info.  Encouraged him to contact the diabetes clinic if he ever has difficulty getting his medications and we will help him.    Assessment/Plan:  No further f/u planned at this time.     Provided patient/family with contact information and availability.    ABRAHAM Hammer, [facility]    [facility] [place]  [phone]

## 2022-10-28 NOTE — PROGRESS NOTES
Chief complaint: Shortness of breath    HPI:   Loy Limon is a 67 year old male with history of HCC status post liver transplant on 2018, hypertension hyperlipidemia and prior history of smoking who presents for evaluation of shortness of breath.     He reports that since last visit he is having some cough that is related to flu related symptoms. He denies chest pain but does have atypical chest discomfort during the winter months that improve during the summer months.     The patient's risk factor profile is: (+) HTN, (+) diabetes, (+) hyperlipidemia, (+) prior tobacco use, (-) family Hx CAD.     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     He denies any PND, orthopnea, peripheral edema, palpitations, lightheadedness or syncope.     PAST MEDICAL HISTORY:  Past Medical History:   Diagnosis Date     Anemia      Biliary stricture of transplanted liver (H) 2018     BPH (benign prostatic hyperplasia)      Cancer (H)     hepatocellualr carcinoma     Cirrhosis of liver (H) 2018     Diabetes (H)      Enlarged prostate      Hypothyroidism      Impotence of organic origin 2020     Inguinal hernia     Repaired with mesh on 18     Liver lesion 2018     Liver transplanted (H) 2018     donor liver transplant     Portal vein thrombosis     on path explant     Postoperative atrial fibrillation (H) 2018     Prostate cancer (H)      TB lung, latent     Treated        CURRENT MEDICATIONS:  Current Outpatient Medications   Medication Sig Dispense Refill     amLODIPine (NORVASC) 5 MG tablet Take 1 tablet (5 mg) by mouth daily 90 tablet 3     atorvastatin (LIPITOR) 40 MG tablet Take 1 tablet (40 mg) by mouth daily 90 tablet 3     blood glucose (NO BRAND SPECIFIED) lancets standard Use to test blood sugar 2 times daily or as directed. 50 lancet 3     blood glucose (NO BRAND SPECIFIED) test strip Use to test blood sugar 2 times daily or as directed. One touch  ultra test strips 50 strip 3     blood glucose (ONE TOUCH SURESOFT) lancing device Lancing device to be used with lancets. 1 each 0     blood glucose monitoring (ONE TOUCH ULTRA 2) meter device kit Use to test blood sugar 2 times daily or as directed. 1 kit 0     blood glucose monitoring (ONE TOUCH ULTRASOFT) lancets Use to test blood sugar 2 times daily. 100 each 6     Blood Pressure Monitor KIT Automatic Blood Pressure Monitor 1 kit 0     dulaglutide (TRULICITY) 1.5 MG/0.5ML pen Inject 1.5 mg Subcutaneous every 7 days 6 mL 1     guaiFENesin-dextromethorphan (ROBITUSSIN DM) 100-10 MG/5ML syrup Take 10 mLs by mouth every 4 hours as needed for cough 30 mL 0     insulin glargine (LANTUS PEN) 100 UNIT/ML pen Inject 20 Units Subcutaneous At Bedtime 15 mL 3     levothyroxine (SYNTHROID/LEVOTHROID) 137 MCG tablet Take 1 tablet (137 mcg) by mouth daily 90 tablet 1     losartan (COZAAR) 100 MG tablet Take 1 tablet (100 mg) by mouth daily 90 tablet 3     metFORMIN (GLUCOPHAGE XR) 500 MG 24 hr tablet Take 2 tablets (1,000 mg) by mouth 2 times daily (with meals) 360 tablet 3     mycophenolate (GENERIC EQUIVALENT) 250 MG capsule TAKE 3 CAPSULES(750 MG) BY MOUTH TWICE DAILY 540 capsule 3     nitroGLYcerin (NITROSTAT) 0.4 MG sublingual tablet For chest pain place 1 tablet under the tongue every 5 minutes for 3 doses. If symptoms persist 5 minutes after 1st dose call 911. (Patient not taking: Reported on 10/4/2022) 30 tablet 1     predniSONE (DELTASONE) 5 MG tablet Take 1 tablet (5 mg) by mouth daily 90 tablet 3     ursodiol (ACTIGALL) 250 MG tablet Take 1 tablet (250 mg) by mouth 2 times daily 180 tablet 3     Vitamin D3 (CHOLECALCIFEROL) 25 mcg (1000 units) tablet Take 2 tablets (50 mcg) by mouth daily 180 tablet 3       PAST SURGICAL HISTORY:  Past Surgical History:   Procedure Laterality Date     APPENDECTOMY       COLONOSCOPY      2018     CV CORONARY ANGIOGRAM N/A 10/13/2021    Procedure: CV CORONARY ANGIOGRAM;  Surgeon:  "Yaya Hampton MD;  Location:  HEART CARDIAC CATH LAB     CV CORONARY LITHOTRIPSY PCI N/A 10/13/2021    Procedure: CV Coronary Lithotripsy PCI;  Surgeon: Yaya Hampton MD;  Location:  HE[facility] CARDI[facility] CATH LAB     CV INSTANTANEOUS WAVE-FREE RATIO N/A 10/13/2021    Procedure: Instantaneous Wave-Free Ratio;  Surgeon: Yaya Hampton MD;  Location:  HE[facility] CARDI[facility] CATH LAB     CV INTRAVASULAR ULTRASOUND N/A 10/13/2021    Procedure: Intravascular Ultrasound;  Surgeon: Yaya Hampton MD;  Location:  HEART CARDI[facility] CATH LAB     CV PCI STENT DRUG ELUTING N/A 10/13/2021    Procedure: Percutaneous Coronary Intervention Stent Drug Eluting;  Surgeon: Yaya Hampton MD;  Location:  HE[facility] CARDIAC CATH LAB     DAVINCI PROSTATECTOMY N/A 1/3/2020    Procedure: Robot-assisted laparoscopic radical prostatectomy, Bladder biopsy;  Surgeon: Kenrick Casiano MD;  [facility]     ENDOSCOPIC RETROGRADE CHOLANGIOPANCREATOGRAM N/A 2018    Procedure: ENDOSCOPIC RETROGRADE CHOLANGIOPANCREATOGRAM, with Billary Sphincterotomy and Billary Stent Placement;  Surgeon: Omero Lawler MD;  [facility]     ENDOSCOPIC RETROGRADE CHOLANGIOPANCREATOGRAM  2019    Procedure: COMBINED ENDOSCOPIC RETROGRADE CHOLANGIOPANCREATOGRAPHY, Bile Duct Stent Exchange;  Surgeon: Omero Lawler MD;  [facility]     ENDOSCOPIC RETROGRADE CHOLANGIOPANCREATOGRAM N/A 2019    Procedure: ENDOSCOPIC RETROGRADE CHOLANGIOPANCREATOGRAPHY, WITH BILIARY STENT REMOVAL AND STONE EXTRACTION;  Surgeon: Omero Lawler MD;  [facility]     HERNIORRHAPHY INGUINAL  2018    Procedure: Herniorrhaphy inguinal;  Surgeon: Emil Echevarria MD;  [facility]     IR LIVER BIOPSY PERCUTANEOUS  2018     LAPAROTOMY EXPLORATORY      per the patient \"for infection in the LLQ\"      TRANSPLANT LIVER RECIPIENT  DONOR N/A " "2018    Procedure: TRANSPLANT LIVER RECIPIENT  DONOR;  Surgeon: Emil Echevarria MD;  [facility]       ALLERGIES:   No Known Allergies    FAMILY HISTORY:  Family History   Problem Relation Age of Onset     Memory loss Mother      Liver Disease Brother      Skin Cancer No family hx of      Melanoma No family hx of      No family history of premature CAD or sudden death.    SOCIAL HISTORY:  Social History     Tobacco Use     Smoking status: Former     Packs/day: 0.03     Years: 20.00     Pack years: 0.60     Types: Cigarettes, Cigars     Start date: 1970     Quit date: 3/14/2018     Years since quittin.6     Smokeless tobacco: Never     Tobacco comments:     Quit smoking 2018, one cigarette after dinner   Substance Use Topics     Alcohol use: No     Comment: Quit drinking 2018     Drug use: No       ROS:   A comprehensive 14 point review of systems is negative other than as mentioned in HPI.    Exam:  BP (!) 163/89 (BP Location: Right arm, Patient Position: Chair, Cuff Size: Adult Regular)   Pulse 75   Ht 1.727 m (5' 8\")   Wt 89.4 kg (197 lb)   SpO2 98%   BMI 29.95 kg/m    GENERAL APPEARANCE: healthy, alert and no distress  EYES: no icterus, no xanthelasmas  ENT: normal palate, mucosa moist, no central cyanosis  NECK: JVP not elevated  RESPIRATORY: lungs clear to auscultation - no rales, rhonchi or wheezes, no use of accessory muscles, no retractions, respirations are unlabored, normal respiratory rate  CARDIOVASCULAR: regular rhythm, normal S1 with physiologic split S2, no S3 or S4 and no murmur, click or rub.  GI: soft, non tender, bowel sounds normal,no abdominal bruits  EXTREMITIES: no edema, no bruits  NEURO: alert and oriented to person/place/time, normal speech, gait and affect  VASC: Radial, dorsalis pedis and posterior tibialis pulses 2+ bilaterally.  SKIN: no ecchymoses, no rashes.  PSYCH: cooperative, affect appropriate.     Labs:  Reviewed.   LDL Cholesterol " Calculated   Date Value Ref Range Status   01/25/2022 74 <=100 mg/dL Final   09/25/2020 69 <100 mg/dL Final     Comment:     Desirable:       <100 mg/dl       Testing/Procedures:  I personally visualized and interpreted:  Stress echocardiogram from 7/7/2021 with submaximal heart rate of 71% and also stopped due to chest pain.     Coronary angiogram from 10/5/2021 show one-vessel CAD with 60% mid LAD stenosis hemodynamically significant status post TIA to the mid LAD with a 3.5 x 38 mm SANTIAGO.     Assessment and Plan:   Loy Limon is a 67 year old male with history of HCC status post liver transplant on 12/20/2018, hypertension hyperlipidemia and prior history of smoking who presents for followup.     Coronary artery disease, one-vessel CAD with 60% mid LAD stenosis hemodynamically significant status post TIA to the mid LAD with a 3.5 x 38 mm SANTIAGO.  No hemodynamic significant stenosis of the left circumflex or the RCA.  He is currently not taking aspirin   - Resume aspirin 81 mg     His LDL has significantly improved on most recent check with current LDL at goal 74 mg/dL from 311 mg/dL on September 2021.     - Continue atorvastatin 40 mg    HTN blood pressure not at goal   - Continue Losartan 100 mg   - Chlorthalidone 12.5 mg      Follow up in one year with echo prior.     Chart review time: 20 minutes  Visit time: 20 minutes  Total time spent: 40 minutes  >50% of this 20-minute visit were spent with the patient on in-person counseling and discussion regarding coronary artery disease.     The patient states understanding and is agreeable with plan.     Theron Nicholas MD  Cardiology    CC  [facility]

## 2022-10-28 NOTE — NURSING NOTE
Chief Complaint   Patient presents with     Follow Up     Yu F/U       Vitals were taken and medications reconciled.    Sergio Sharma, EMT  9:06 AM

## 2022-10-28 NOTE — LETTER
10/28/2022      RE: Loy Limon  [address]  [facility] [address]       Dear Colleague,    Thank you for the opportunity to participate in the care of your patient, Loy Limon, at the [facility]. Please see a copy of my visit note below.    Chief complaint: Shortness of breath    HPI:   Loy Limon is a 67 year old male with history of HCC status post liver transplant on 2018, hypertension hyperlipidemia and prior history of smoking who presents for evaluation of shortness of breath.     He reports that since last visit he is having some cough that is related to flu related symptoms. He denies chest pain but does have atypical chest discomfort during the winter months that improve during the summer months.     The patient's risk factor profile is: (+) HTN, (+) diabetes, (+) hyperlipidemia, (+) prior tobacco use, (-) family Hx CAD.     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     He denies any PND, orthopnea, peripheral edema, palpitations, lightheadedness or syncope.     PAST MEDICAL HISTORY:  Past Medical History:   Diagnosis Date     Anemia      Biliary stricture of transplanted liver (H) 2018     BPH (benign prostatic hyperplasia)      Cancer (H)     hepatocellualr carcinoma     Cirrhosis of liver (H) 2018     Diabetes (H)      Enlarged prostate      Hypothyroidism      Impotence of organic origin 2020     Inguinal hernia     Repaired with mesh on 18     Liver lesion 2018     Liver transplanted (H) 2018     donor liver transplant     Portal vein thrombosis     on path explant     Postoperative atrial fibrillation (H) 2018     Prostate cancer (H)      TB lung, latent     Treated        CURRENT MEDICATIONS:  Current Outpatient Medications   Medication Sig Dispense Refill     amLODIPine (NORVASC) 5 MG tablet Take 1 tablet (5 mg) by mouth  daily 90 tablet 3     atorvastatin (LIPITOR) 40 MG tablet Take 1 tablet (40 mg) by mouth daily 90 tablet 3     blood glucose (NO BRAND SPECIFIED) lancets standard Use to test blood sugar 2 times daily or as directed. 50 lancet 3     blood glucose (NO BRAND SPECIFIED) test strip Use to test blood sugar 2 times daily or as directed. One touch ultra test strips 50 strip 3     blood glucose (ONE TOUCH SURESOFT) lancing device Lancing device to be used with lancets. 1 each 0     blood glucose monitoring (ONE TOUCH ULTRA 2) meter device kit Use to test blood sugar 2 times daily or as directed. 1 kit 0     blood glucose monitoring (ONE TOUCH ULTRASOFT) lancets Use to test blood sugar 2 times daily. 100 each 6     Blood Pressure Monitor KIT Automatic Blood Pressure Monitor 1 kit 0     dulaglutide (TRULICITY) 1.5 MG/0.5ML pen Inject 1.5 mg Subcutaneous every 7 days 6 mL 1     guaiFENesin-dextromethorphan (ROBITUSSIN DM) 100-10 MG/5ML syrup Take 10 mLs by mouth every 4 hours as needed for cough 30 mL 0     insulin glargine (LANTUS PEN) 100 UNIT/ML pen Inject 20 Units Subcutaneous At Bedtime 15 mL 3     levothyroxine (SYNTHROID/LEVOTHROID) 137 MCG tablet Take 1 tablet (137 mcg) by mouth daily 90 tablet 1     losartan (COZAAR) 100 MG tablet Take 1 tablet (100 mg) by mouth daily 90 tablet 3     metFORMIN (GLUCOPHAGE XR) 500 MG 24 hr tablet Take 2 tablets (1,000 mg) by mouth 2 times daily (with meals) 360 tablet 3     mycophenolate (GENERIC EQUIVALENT) 250 MG capsule TAKE 3 CAPSULES(750 MG) BY MOUTH TWICE DAILY 540 capsule 3     nitroGLYcerin (NITROSTAT) 0.4 MG sublingual tablet For chest pain place 1 tablet under the tongue every 5 minutes for 3 doses. If symptoms persist 5 minutes after 1st dose call 911. (Patient not taking: Reported on 10/4/2022) 30 tablet 1     predniSONE (DELTASONE) 5 MG tablet Take 1 tablet (5 mg) by mouth daily 90 tablet 3     ursodiol (ACTIGALL) 250 MG tablet Take 1 tablet (250 mg) by mouth 2 times daily  180 tablet 3     Vitamin D3 (CHOLECALCIFEROL) 25 mcg (1000 units) tablet Take 2 tablets (50 mcg) by mouth daily 180 tablet 3       PAST SURGICAL HISTORY:  Past Surgical History:   Procedure Laterality Date     APPENDECTOMY       COLONOSCOPY      2018     CV CORONARY ANGIOGRAM N/A 10/13/2021    Procedure: CV CORONARY ANGIOGRAM;  Surgeon: Yaya Hampton MD;  Location:  HEART CARDIAC CATH LAB     CV CORONARY LITHOTRIPSY PCI N/A 10/13/2021    Procedure: CV Coronary Lithotripsy PCI;  Surgeon: Yaya Hampton MD;  Location:  HEART CARDIAC CATH LAB     CV INSTANTANEOUS WAVE-FREE RATIO N/A 10/13/2021    Procedure: Instantaneous Wave-Free Ratio;  Surgeon: Yaya Hampton MD;  Location:  HEART CARDIAC CATH LAB     CV INTRAVASULAR ULTRASOUND N/A 10/13/2021    Procedure: Intravascular Ultrasound;  Surgeon: Yaya Hampton MD;  Location:  HEART CARDIAC CATH LAB     CV PCI STENT DRUG ELUTING N/A 10/13/2021    Procedure: Percutaneous Coronary Intervention Stent Drug Eluting;  Surgeon: Yaya Hampton MD;  Location:  HEART CARDIAC CATH LAB     DAVINCI PROSTATECTOMY N/A 1/3/2020    Procedure: Robot-assisted laparoscopic radical prostatectomy, Bladder biopsy;  Surgeon: Kenrick Casiano MD;  [facility]: [facility]     ENDOSCOPIC RETROGRADE CHOLANGIOPANCREATOGRAM N/A 12/28/2018    Procedure: ENDOSCOPIC RETROGRADE CHOLANGIOPANCREATOGRAM, with Billary Sphincterotomy and Billary Stent Placement;  Surgeon: Omero Lawler MD;  [facility]     ENDOSCOPIC RETROGRADE CHOLANGIOPANCREATOGRAM  2/18/2019    Procedure: COMBINED ENDOSCOPIC RETROGRADE CHOLANGIOPANCREATOGRAPHY, Bile Duct Stent Exchange;  Surgeon: Omero Lawler MD;  [facility]     ENDOSCOPIC RETROGRADE CHOLANGIOPANCREATOGRAM N/A 4/29/2019    Procedure: ENDOSCOPIC RETROGRADE CHOLANGIOPANCREATOGRAPHY, WITH BILIARY STENT REMOVAL AND STONE EXTRACTION;  Surgeon: Nuris  "Omero Wiggins MD;  [facility]     HERNIORRHAPHY INGUINAL  2018    Procedure: Herniorrhaphy inguinal;  Surgeon: Emil Echevarria MD;  [facility]     IR LIVER BIOPSY PERCUTANEOUS  2018     LAPAROTOMY EXPLORATORY      per the patient \"for infection in the LLQ\"      TRANSPLANT LIVER RECIPIENT  DONOR N/A 2018    Procedure: TRANSPLANT LIVER RECIPIENT  DONOR;  Surgeon: Emil Echevarria MD;  [facility]       ALLERGIES:   No Known Allergies    FAMILY HISTORY:  Family History   Problem Relation Age of Onset     Memory loss Mother      Liver Disease Brother      Skin Cancer No family hx of      Melanoma No family hx of      No family history of premature CAD or sudden death.    SOCIAL HISTORY:  Social History     Tobacco Use     Smoking status: Former     Packs/day: 0.03     Years: 20.00     Pack years: 0.60     Types: Cigarettes, Cigars     Start date: 1970     Quit date: 3/14/2018     Years since quittin.6     Smokeless tobacco: Never     Tobacco comments:     Quit smoking 2018, one cigarette after dinner   Substance Use Topics     Alcohol use: No     Comment: Quit drinking 2018     Drug use: No       ROS:   A comprehensive 14 point review of systems is negative other than as mentioned in HPI.    Exam:  BP (!) 163/89 (BP Location: Right arm, Patient Position: Chair, Cuff Size: Adult Regular)   Pulse 75   Ht 1.727 m (5' 8\")   Wt 89.4 kg (197 lb)   SpO2 98%   BMI 29.95 kg/m    GENERAL APPEARANCE: healthy, alert and no distress  EYES: no icterus, no xanthelasmas  ENT: normal palate, mucosa moist, no central cyanosis  NECK: JVP not elevated  RESPIRATORY: lungs clear to auscultation - no rales, rhonchi or wheezes, no use of accessory muscles, no retractions, respirations are unlabored, normal respiratory rate  CARDIOVASCULAR: regular rhythm, normal S1 with physiologic split S2, no S3 or S4 and no murmur, click or rub.  GI: soft, non tender, bowel " sounds normal,no abdominal bruits  EXTREMITIES: no edema, no bruits  NEURO: alert and oriented to person/place/time, normal speech, gait and affect  VASC: Radial, dorsalis pedis and posterior tibialis pulses 2+ bilaterally.  SKIN: no ecchymoses, no rashes.  PSYCH: cooperative, affect appropriate.     Labs:  Reviewed.   LDL Cholesterol Calculated   Date Value Ref Range Status   01/25/2022 74 <=100 mg/dL Final   09/25/2020 69 <100 mg/dL Final     Comment:     Desirable:       <100 mg/dl       Testing/Procedures:  I personally visualized and interpreted:  Stress echocardiogram from 7/7/2021 with submaximal heart rate of 71% and also stopped due to chest pain.     Coronary angiogram from 10/5/2021 show one-vessel CAD with 60% mid LAD stenosis hemodynamically significant status post TIA to the mid LAD with a 3.5 x 38 mm SANTIAGO.     Assessment and Plan:   Loy Limon is a 67 year old male with history of HCC status post liver transplant on 12/20/2018, hypertension hyperlipidemia and prior history of smoking who presents for followup.     Coronary artery disease, one-vessel CAD with 60% mid LAD stenosis hemodynamically significant status post TIA to the mid LAD with a 3.5 x 38 mm SANTIAGO.  No hemodynamic significant stenosis of the left circumflex or the RCA.  He is currently not taking aspirin   - Resume aspirin 81 mg     His LDL has significantly improved on most recent check with current LDL at goal 74 mg/dL from 311 mg/dL on September 2021.     - Continue atorvastatin 40 mg    HTN blood pressure not at goal   - Continue Losartan 100 mg   - Chlorthalidone 12.5 mg      Follow up in one year with echo prior.     Chart review time: 20 minutes  Visit time: 20 minutes  Total time spent: 40 minutes  >50% of this 20-minute visit were spent with the patient on in-person counseling and discussion regarding coronary artery disease.     The patient states understanding and is agreeable with plan.     Theron Nicholas  MD  Cardiology    [facility]  [facility]            Please do not hesitate to contact me if you have any questions/concerns.     Sincerely,     Yu Darby MD

## 2022-10-28 NOTE — NURSING NOTE
October 28, 2022    Medication Changes:   Start Aspirin 81 mg once daily  Start chlorthalidone 12.5 mg once daily      Follow Up:   Follow-up in 1 year with Dr. Nicholas with Echocardiogram prior      Patient verbalized understanding of all health information given and agreed to call with further questions or concerns.      Dakota Srinivasan

## 2022-10-28 NOTE — PATIENT INSTRUCTIONS
October 28, 2022    Cardiology Provider You Saw During Your Visit: Dr. Nicholas      Medication Changes:   Start Aspirin 81 mg once daily  Start chlorthalidone 12.5 mg once daily      Follow Up:   Follow-up in 1 year with Dr. Nicholas with Echocardiogram prior        Follow the American Heart Association Diet and Lifestyle Recommendations:  -Limit saturated fat, trans fat, sodium, red meat, sweets and sugar-sweetened beverages. If you choose to eat red meat, compare labels and select the leanest cuts available.  -Aim for at least 150 minutes of moderate physical activity or 75 minutes of vigorous physical activity - or an equal combination of both - each week.      To Reach Us:  -During business hours: [phone], press option # 1 to schedule an appointment or to leave a message for your care team.     -After hours, weekends or holidays: [phone], press option #4 and ask to speak to the on-call cardiologist. Inform them you are a patient at the [facility].      Kim Chew RN  Cardiology Care Coordinator - General Cardiology  [facility]

## 2022-12-27 ENCOUNTER — LAB (OUTPATIENT)
Dept: LAB | Facility: CLINIC | Age: 67
End: 2022-12-27
Payer: COMMERCIAL

## 2022-12-27 DIAGNOSIS — C22.0 HCC (HEPATOCELLULAR CARCINOMA) (H): ICD-10-CM

## 2022-12-27 DIAGNOSIS — N18.2 CKD (CHRONIC KIDNEY DISEASE) STAGE 2, GFR 60-89 ML/MIN: ICD-10-CM

## 2022-12-27 DIAGNOSIS — R80.1 PERSISTENT PROTEINURIA: ICD-10-CM

## 2022-12-27 DIAGNOSIS — Z94.4 LIVER REPLACED BY TRANSPLANT (H): ICD-10-CM

## 2022-12-27 LAB
AFP SERPL-MCNC: 1.8 NG/ML
ALBUMIN MFR UR ELPH: 303 MG/DL
ALBUMIN SERPL BCG-MCNC: 3.6 G/DL (ref 3.5–5.2)
ALP SERPL-CCNC: 162 U/L (ref 40–129)
ALT SERPL W P-5'-P-CCNC: 21 U/L (ref 10–50)
ANION GAP SERPL CALCULATED.3IONS-SCNC: 7 MMOL/L (ref 7–15)
AST SERPL W P-5'-P-CCNC: 16 U/L (ref 10–50)
BASOPHILS # BLD AUTO: 0 10E3/UL (ref 0–0.2)
BASOPHILS NFR BLD AUTO: 1 %
BILIRUB DIRECT SERPL-MCNC: <0.2 MG/DL (ref 0–0.3)
BILIRUB SERPL-MCNC: 0.5 MG/DL
BUN SERPL-MCNC: 17.4 MG/DL (ref 8–23)
CALCIUM SERPL-MCNC: 9 MG/DL (ref 8.8–10.2)
CHLORIDE SERPL-SCNC: 105 MMOL/L (ref 98–107)
CREAT SERPL-MCNC: 0.85 MG/DL (ref 0.67–1.17)
CREAT UR-MCNC: 80.7 MG/DL
DEPRECATED HCO3 PLAS-SCNC: 27 MMOL/L (ref 22–29)
EOSINOPHIL # BLD AUTO: 0.2 10E3/UL (ref 0–0.7)
EOSINOPHIL NFR BLD AUTO: 3 %
ERYTHROCYTE [DISTWIDTH] IN BLOOD BY AUTOMATED COUNT: 12.6 % (ref 10–15)
GFR SERPL CREATININE-BSD FRML MDRD: >90 ML/MIN/1.73M2
GLUCOSE SERPL-MCNC: 184 MG/DL (ref 70–99)
HCT VFR BLD AUTO: 41.5 % (ref 40–53)
HGB BLD-MCNC: 13.6 G/DL (ref 13.3–17.7)
IMM GRANULOCYTES # BLD: 0 10E3/UL
IMM GRANULOCYTES NFR BLD: 1 %
LYMPHOCYTES # BLD AUTO: 1.7 10E3/UL (ref 0.8–5.3)
LYMPHOCYTES NFR BLD AUTO: 32 %
MCH RBC QN AUTO: 28.6 PG (ref 26.5–33)
MCHC RBC AUTO-ENTMCNC: 32.8 G/DL (ref 31.5–36.5)
MCV RBC AUTO: 87 FL (ref 78–100)
MONOCYTES # BLD AUTO: 0.4 10E3/UL (ref 0–1.3)
MONOCYTES NFR BLD AUTO: 7 %
NEUTROPHILS # BLD AUTO: 3 10E3/UL (ref 1.6–8.3)
NEUTROPHILS NFR BLD AUTO: 56 %
NRBC # BLD AUTO: 0 10E3/UL
NRBC BLD AUTO-RTO: 0 /100
PHOSPHATE SERPL-MCNC: 3.1 MG/DL (ref 2.5–4.5)
PLATELET # BLD AUTO: 193 10E3/UL (ref 150–450)
POTASSIUM SERPL-SCNC: 4.1 MMOL/L (ref 3.4–5.3)
PROT SERPL-MCNC: 6.1 G/DL (ref 6.4–8.3)
PROT/CREAT 24H UR: 3.75 MG/MG CR (ref 0–0.2)
RBC # BLD AUTO: 4.76 10E6/UL (ref 4.4–5.9)
SODIUM SERPL-SCNC: 139 MMOL/L (ref 136–145)
WBC # BLD AUTO: 5.3 10E3/UL (ref 4–11)

## 2022-12-27 PROCEDURE — 80053 COMPREHEN METABOLIC PANEL: CPT | Performed by: PATHOLOGY

## 2022-12-27 PROCEDURE — 82105 ALPHA-FETOPROTEIN SERUM: CPT | Mod: 90 | Performed by: PATHOLOGY

## 2022-12-27 PROCEDURE — 84100 ASSAY OF PHOSPHORUS: CPT | Performed by: PATHOLOGY

## 2022-12-27 PROCEDURE — 85025 COMPLETE CBC W/AUTO DIFF WBC: CPT | Performed by: PATHOLOGY

## 2022-12-27 PROCEDURE — 99000 SPECIMEN HANDLING OFFICE-LAB: CPT | Performed by: PATHOLOGY

## 2022-12-27 PROCEDURE — 84156 ASSAY OF PROTEIN URINE: CPT | Performed by: PATHOLOGY

## 2022-12-27 PROCEDURE — 36415 COLL VENOUS BLD VENIPUNCTURE: CPT | Performed by: PATHOLOGY

## 2022-12-27 PROCEDURE — 82248 BILIRUBIN DIRECT: CPT | Performed by: PATHOLOGY

## 2023-01-27 ENCOUNTER — LAB (OUTPATIENT)
Dept: LAB | Facility: CLINIC | Age: 68
End: 2023-01-27
Payer: COMMERCIAL

## 2023-01-27 ENCOUNTER — OFFICE VISIT (OUTPATIENT)
Dept: FAMILY MEDICINE | Facility: CLINIC | Age: 68
End: 2023-01-27
Payer: COMMERCIAL

## 2023-01-27 VITALS
BODY MASS INDEX: 30.09 KG/M2 | OXYGEN SATURATION: 97 % | DIASTOLIC BLOOD PRESSURE: 80 MMHG | HEART RATE: 61 BPM | SYSTOLIC BLOOD PRESSURE: 143 MMHG | WEIGHT: 197.9 LBS

## 2023-01-27 DIAGNOSIS — E11.65 UNCONTROLLED TYPE 2 DIABETES MELLITUS WITH HYPERGLYCEMIA (H): Primary | ICD-10-CM

## 2023-01-27 DIAGNOSIS — E11.65 UNCONTROLLED TYPE 2 DIABETES MELLITUS WITH HYPERGLYCEMIA (H): ICD-10-CM

## 2023-01-27 DIAGNOSIS — Z94.4 LIVER TRANSPLANTED (H): ICD-10-CM

## 2023-01-27 DIAGNOSIS — E03.8 OTHER SPECIFIED HYPOTHYROIDISM: ICD-10-CM

## 2023-01-27 DIAGNOSIS — R41.3 MEMORY CHANGE: ICD-10-CM

## 2023-01-27 LAB
ALBUMIN SERPL BCG-MCNC: 3.8 G/DL (ref 3.5–5.2)
ALP SERPL-CCNC: 166 U/L (ref 40–129)
ALT SERPL W P-5'-P-CCNC: 21 U/L (ref 10–50)
ANION GAP SERPL CALCULATED.3IONS-SCNC: 9 MMOL/L (ref 7–15)
AST SERPL W P-5'-P-CCNC: 20 U/L (ref 10–50)
BASOPHILS # BLD AUTO: 0 10E3/UL (ref 0–0.2)
BASOPHILS NFR BLD AUTO: 0 %
BILIRUB SERPL-MCNC: 0.6 MG/DL
BUN SERPL-MCNC: 16.9 MG/DL (ref 8–23)
CALCIUM SERPL-MCNC: 9.4 MG/DL (ref 8.8–10.2)
CHLORIDE SERPL-SCNC: 106 MMOL/L (ref 98–107)
CREAT SERPL-MCNC: 0.92 MG/DL (ref 0.67–1.17)
DEPRECATED HCO3 PLAS-SCNC: 27 MMOL/L (ref 22–29)
EOSINOPHIL # BLD AUTO: 0.1 10E3/UL (ref 0–0.7)
EOSINOPHIL NFR BLD AUTO: 2 %
ERYTHROCYTE [DISTWIDTH] IN BLOOD BY AUTOMATED COUNT: 12.5 % (ref 10–15)
GFR SERPL CREATININE-BSD FRML MDRD: 90 ML/MIN/1.73M2
GLUCOSE SERPL-MCNC: 123 MG/DL (ref 70–99)
HBA1C MFR BLD: 9 %
HCT VFR BLD AUTO: 42.1 % (ref 40–53)
HGB BLD-MCNC: 13.6 G/DL (ref 13.3–17.7)
IMM GRANULOCYTES # BLD: 0 10E3/UL
IMM GRANULOCYTES NFR BLD: 0 %
LYMPHOCYTES # BLD AUTO: 1.6 10E3/UL (ref 0.8–5.3)
LYMPHOCYTES NFR BLD AUTO: 25 %
MCH RBC QN AUTO: 28.2 PG (ref 26.5–33)
MCHC RBC AUTO-ENTMCNC: 32.3 G/DL (ref 31.5–36.5)
MCV RBC AUTO: 87 FL (ref 78–100)
MONOCYTES # BLD AUTO: 0.3 10E3/UL (ref 0–1.3)
MONOCYTES NFR BLD AUTO: 5 %
NEUTROPHILS # BLD AUTO: 4.2 10E3/UL (ref 1.6–8.3)
NEUTROPHILS NFR BLD AUTO: 68 %
NRBC # BLD AUTO: 0 10E3/UL
NRBC BLD AUTO-RTO: 0 /100
PLATELET # BLD AUTO: 218 10E3/UL (ref 150–450)
POTASSIUM SERPL-SCNC: 4.4 MMOL/L (ref 3.4–5.3)
PROT SERPL-MCNC: 6.4 G/DL (ref 6.4–8.3)
RBC # BLD AUTO: 4.82 10E6/UL (ref 4.4–5.9)
SODIUM SERPL-SCNC: 142 MMOL/L (ref 136–145)
T4 FREE SERPL-MCNC: 1.03 NG/DL (ref 0.9–1.7)
TSH SERPL DL<=0.005 MIU/L-ACNC: 9.8 UIU/ML (ref 0.3–4.2)
WBC # BLD AUTO: 6.2 10E3/UL (ref 4–11)

## 2023-01-27 PROCEDURE — 84439 ASSAY OF FREE THYROXINE: CPT | Performed by: PATHOLOGY

## 2023-01-27 PROCEDURE — 99214 OFFICE O/P EST MOD 30 MIN: CPT | Performed by: FAMILY MEDICINE

## 2023-01-27 PROCEDURE — 80050 GENERAL HEALTH PANEL: CPT | Performed by: PATHOLOGY

## 2023-01-27 PROCEDURE — 99000 SPECIMEN HANDLING OFFICE-LAB: CPT | Performed by: PATHOLOGY

## 2023-01-27 PROCEDURE — 36415 COLL VENOUS BLD VENIPUNCTURE: CPT | Performed by: PATHOLOGY

## 2023-01-27 PROCEDURE — 83036 HEMOGLOBIN GLYCOSYLATED A1C: CPT | Mod: 90 | Performed by: PATHOLOGY

## 2023-01-27 RX ORDER — PREDNISONE 5 MG/1
5 TABLET ORAL DAILY
Qty: 90 TABLET | Refills: 3 | Status: SHIPPED | OUTPATIENT
Start: 2023-01-27 | End: 2024-01-19

## 2023-01-27 RX ORDER — LEVOTHYROXINE SODIUM 137 UG/1
137 TABLET ORAL DAILY
Qty: 90 TABLET | Refills: 1 | Status: SHIPPED | OUTPATIENT
Start: 2023-01-27 | End: 2023-06-20 | Stop reason: DRUGHIGH

## 2023-01-27 NOTE — PROGRESS NOTES
"    Assessment & Plan     Other specified hypothyroidism  Due  - levothyroxine (SYNTHROID/LEVOTHROID) 137 MCG tablet; Take 1 tablet (137 mcg) by mouth daily    Liver transplanted (H)  Refilled pred in future dose/use per transplant  - predniSONE (DELTASONE) 5 MG tablet; Take 1 tablet (5 mg) by mouth daily  - CBC with platelets and differential; Future  - Comprehensive metabolic panel (BMP + Alb, Alk Phos, ALT, AST, Total. Bili, TP); Future    Uncontrolled type 2 diabetes mellitus with hyperglycemia (H)    - Hemoglobin A1c; Future  - TSH with free T4 reflex; Future    Memory change  Labs. Discuss w/ neurology.  - Adult Neurology  Referral      31 minutes spent on the date of the encounter doing chart review, history and exam, documentation and further activities per the note, lengthy as thru ipad interperter who was unusually slow  [id number]}     BMI:   Estimated body mass index is 30.09 kg/m  as calculated from the following:    Height as of 10/28/22: 1.727 m (5' 8\").    Weight as of this encounter: 89.8 kg (197 lb 14.4 oz).     Return in about 3 months (around 4/27/2023).    Jaswinder Anne MD  [facility]   Loy is a 67 year old, presenting for the following health issues:  Follow Up (Pt here for a 3 month follow up; pt reporting memory loss for about a month ) and Recheck Medication      HPI Here w/ ipad   No acute c/o  Notes that gradually memory is less sharp, as example used kvng left something in it without removing it  Lives w/ wife, does not require her help with adl's   Meds rvwd, no meds that would clearly impact memory  On pred daily from transplant, per pt running out so I will refill he knows in future to work w/ them  Also running low on synthroid, gets from endocrine, will refill as about out he states  No CNS history or TBI or recent brain imaging  Otherwise feeling well, ID ROS negative  DM is due for labs  Past Medical " History:   Diagnosis Date     Anemia      Biliary stricture of transplanted liver (H) 2018     BPH (benign prostatic hyperplasia)      Cancer (H)     hepatocellualr carcinoma     Cirrhosis of liver (H) 2018     Diabetes (H)      Enlarged prostate      Hypothyroidism      Impotence of organic origin 2020     Inguinal hernia     Repaired with mesh on 18     Liver lesion 2018     Liver transplanted (H) 2018     donor liver transplant     Portal vein thrombosis     on path explant     Postoperative atrial fibrillation (H) 2018     Prostate cancer (H)      TB lung, latent     Treated      Past Surgical History:   Procedure Laterality Date     APPENDECTOMY       COLONOSCOPY           CV CORONARY ANGIOGRAM N/A 10/13/2021    Procedure: CV CORONARY ANGIOGRAM;  Surgeon: Yaya Hampton MD;  [facility]     CV CORONARY LITHOTRIPSY PCI N/A 10/13/2021    Procedure: CV Coronary Lithotripsy PCI;  Surgeon: Yaya Hampton MD;  [facility]     CV INSTANTANEOUS WAVE-FREE RATIO N/A 10/13/2021    Procedure: Instantaneous Wave-Free Ratio;  Surgeon: Yaya Hampton MD;  [facility]     CV INTRAVASULAR ULTRASOUND N/A 10/13/2021    Procedure: Intravascular Ultrasound;  Surgeon: Yaya Hampton MD;  [facility]     CV PCI STENT DRUG ELUTING N/A 10/13/2021    Procedure: Percutaneous Coronary Intervention Stent Drug Eluting;  Surgeon: Yaya Hampton MD;  [facility]     DAVINCI PROSTATECTOMY N/A 1/3/2020    Procedure: Robot-assisted laparoscopic radical prostatectomy, Bladder biopsy;  Surgeon: Kenrick Casiano MD;  [facility]: [facility]     ENDOSCOPIC RETROGRADE CHOLANGIOPANCREATOGRAM N/A 2018    Procedure: ENDOSCOPIC RETROGRADE CHOLANGIOPANCREATOGRAM, with Billary Sphincterotomy and  "Billary Stent Placement;  Surgeon: Omero Lawler MD;  [facility]     ENDOSCOPIC RETROGRADE CHOLANGIOPANCREATOGRAM  2019    Procedure: COMBINED ENDOSCOPIC RETROGRADE CHOLANGIOPANCREATOGRAPHY, Bile Duct Stent Exchange;  Surgeon: Omero Lawler MD;  [facility]     ENDOSCOPIC RETROGRADE CHOLANGIOPANCREATOGRAM N/A 2019    Procedure: ENDOSCOPIC RETROGRADE CHOLANGIOPANCREATOGRAPHY, WITH BILIARY STENT REMOVAL AND STONE EXTRACTION;  Surgeon: Omero Lawler MD;  [facility]     HERNIORRHAPHY INGUINAL  2018    Procedure: Herniorrhaphy inguinal;  Surgeon: Emil Echevarria MD;  [facility]     IR LIVER BIOPSY PERCUTANEOUS  2018     LAPAROTOMY EXPLORATORY      per the patient \"for infection in the LLQ\"      TRANSPLANT LIVER RECIPIENT  DONOR N/A 2018    Procedure: TRANSPLANT LIVER RECIPIENT  DONOR;  Surgeon: Emil Echevarria MD;  [facility]     Current Outpatient Medications   Medication     amLODIPine (NORVASC) 5 MG tablet     aspirin (ASA) 81 MG EC tablet     atorvastatin (LIPITOR) 40 MG tablet     chlorthalidone (HYGROTON) 25 MG tablet     levothyroxine (SYNTHROID/LEVOTHROID) 137 MCG tablet     losartan (COZAAR) 100 MG tablet     metFORMIN (GLUCOPHAGE XR) 500 MG 24 hr tablet     mycophenolate (GENERIC EQUIVALENT) 250 MG capsule     nitroGLYcerin (NITROSTAT) 0.4 MG sublingual tablet     predniSONE (DELTASONE) 5 MG tablet     ursodiol (ACTIGALL) 250 MG tablet     Vitamin D3 (CHOLECALCIFEROL) 25 mcg (1000 units) tablet     blood glucose (NO BRAND SPECIFIED) lancets standard     blood glucose (NO BRAND SPECIFIED) test strip     blood glucose (ONE TOUCH SURESOFT) lancing device     blood glucose monitoring (ONE TOUCH ULTRA 2) meter device kit     blood glucose monitoring (ONE TOUCH ULTRASOFT) lancets     Blood Pressure Monitor KIT     dulaglutide (TRULICITY) 1.5 MG/0.5ML pen     guaiFENesin-dextromethorphan (ROBITUSSIN DM) 100-10 " MG/5ML syrup     insulin glargine (LANTUS PEN) 100 UNIT/ML pen     No current facility-administered medications for this visit.     No Known Allergies  Interval visits reviewed per pt no other interval history    Review of Systems         Objective    BP (!) 143/80 (BP Location: Right arm, Patient Position: Sitting, Cuff Size: Adult Large)   Pulse 61   Wt 89.8 kg (197 lb 14.4 oz)   SpO2 97%   BMI 30.09 kg/m    Body mass index is 30.09 kg/m .  Physical Exam   GENERAL: healthy, alert and no distress  RESP: lungs clear to auscultation - no rales, rhonchi or wheezes  CV: regular rate and rhythm, normal S1 S2, no S3 or S4, no murmur, click or rub, no peripheral edema and peripheral pulses strong  ABDOMEN: soft, nontender, no hepatosplenomegaly, no masses and bowel sounds normal  MS: no gross musculoskeletal defects noted, no edema  NEURO: Normal strength and tone, mentation intact and speech normal  PSYCH: mentation appears normal, affect normal/bright, able to speak easily and coherently via , given language barrier we did not order neuropsych tests

## 2023-03-06 DIAGNOSIS — E11.9 DM TYPE 2, GOAL HBA1C 7%-8% (H): ICD-10-CM

## 2023-03-07 DIAGNOSIS — I25.118 CORONARY ARTERY DISEASE OF NATIVE ARTERY OF NATIVE HEART WITH STABLE ANGINA PECTORIS (H): ICD-10-CM

## 2023-03-07 RX ORDER — METFORMIN HCL 500 MG
1000 TABLET, EXTENDED RELEASE 24 HR ORAL 2 TIMES DAILY WITH MEALS
Qty: 360 TABLET | Refills: 3 | OUTPATIENT
Start: 2023-03-07

## 2023-03-07 NOTE — TELEPHONE ENCOUNTER
Pt was last seen in clinic on 1/27/23. Pt last had metFORMIN (GLUCOPHAGE XR) 500 MG 24 hr tablet refilled on 10/21/22 for a 360 day supply by [facility]. Not yet due for refill. Refill request refused.    ADIA DICKSON RN on 3/7/2023 at 10:12 AM

## 2023-03-07 NOTE — TELEPHONE ENCOUNTER
metFORMIN (GLUCOPHAGE-XR) 500 MG 24 hr tablet      Last Written Prescription Date:  1/25/2022  Last Fill Quantity: 360,   # refills: 3  Last Office Visit : 1/27/2023  Future Office visit:  5/9/2023    Routing refill request to provider for review/approval because:  Medication is currently reported/historical on med list  - last ordered by Dr Anne 1/25/2022 for 12 month supply  - patient reported not taking on 7/27/2022

## 2023-03-08 DIAGNOSIS — Z79.4 TYPE 2 DIABETES MELLITUS WITHOUT COMPLICATION, WITH LONG-TERM CURRENT USE OF INSULIN (H): ICD-10-CM

## 2023-03-08 DIAGNOSIS — E11.9 TYPE 2 DIABETES MELLITUS WITHOUT COMPLICATION, WITH LONG-TERM CURRENT USE OF INSULIN (H): ICD-10-CM

## 2023-03-08 NOTE — TELEPHONE ENCOUNTER
insulin pen needle (31G X 5 MM) 31G X 5 MM miscellaneous   Last Written Prescription Date:  ?  Last Fill Quantity: ?,   # refills: ?  Last Office Visit :  10/4/2022  Future Office visit:   4/4/2023  Routing refill request to provider for review/approval because:  Second Request  Needles not on updated med list  Please review and order Per Providers orders.       Belkys Rodriguez RN  Central Triage Red Flags/Med Refills

## 2023-03-09 DIAGNOSIS — E11.9 DM TYPE 2, GOAL HBA1C 7%-8% (H): ICD-10-CM

## 2023-03-09 RX ORDER — METFORMIN HCL 500 MG
1000 TABLET, EXTENDED RELEASE 24 HR ORAL 2 TIMES DAILY WITH MEALS
Qty: 360 TABLET | Refills: 3 | Status: SHIPPED | OUTPATIENT
Start: 2023-03-09 | End: 2023-10-03

## 2023-03-09 RX ORDER — NITROGLYCERIN 0.4 MG/1
TABLET SUBLINGUAL
Qty: 30 TABLET | Refills: 1 | Status: SHIPPED | OUTPATIENT
Start: 2023-03-09 | End: 2024-03-02

## 2023-03-09 NOTE — TELEPHONE ENCOUNTER
metFORMIN (GLUCOPHAGE XR) 500 MG 24 hr tablet    Second Request  Please do NOT Refuse.      Last order was reported historical (10/21/2022)  And Needing a brand new order from Dr. Jaswinder Anne MD Per Pt's updated Insurance.    Please send new order.         Belkys Rodriguez RN  Central Triage Red Flags/Med Refills

## 2023-03-11 ENCOUNTER — HOSPITAL ENCOUNTER (EMERGENCY)
Facility: CLINIC | Age: 68
Discharge: HOME | End: 2023-03-11
Attending: EMERGENCY MEDICINE | Admitting: EMERGENCY MEDICINE
Payer: COMMERCIAL

## 2023-03-11 ENCOUNTER — APPOINTMENT (OUTPATIENT)
Dept: CT IMAGING | Facility: CLINIC | Age: 68
End: 2023-03-11
Attending: EMERGENCY MEDICINE
Payer: COMMERCIAL

## 2023-03-11 VITALS
HEIGHT: 67 IN | DIASTOLIC BLOOD PRESSURE: 87 MMHG | HEART RATE: 77 BPM | SYSTOLIC BLOOD PRESSURE: 168 MMHG | TEMPERATURE: 98.2 F | BODY MASS INDEX: 29.82 KG/M2 | OXYGEN SATURATION: 99 % | WEIGHT: 190 LBS | RESPIRATION RATE: 18 BRPM

## 2023-03-11 DIAGNOSIS — N30.91 HEMORRHAGIC CYSTITIS: ICD-10-CM

## 2023-03-11 LAB
ALBUMIN UR-MCNC: 300 MG/DL
ANION GAP SERPL CALCULATED.3IONS-SCNC: 9 MMOL/L (ref 7–15)
APPEARANCE UR: CLEAR
BASOPHILS # BLD AUTO: 0 10E3/UL (ref 0–0.2)
BASOPHILS NFR BLD AUTO: 1 %
BILIRUB UR QL STRIP: NEGATIVE
BUN SERPL-MCNC: 18.9 MG/DL (ref 8–23)
CALCIUM SERPL-MCNC: 8.7 MG/DL (ref 8.8–10.2)
CHLORIDE SERPL-SCNC: 100 MMOL/L (ref 98–107)
COLOR UR AUTO: ABNORMAL
CREAT SERPL-MCNC: 0.86 MG/DL (ref 0.67–1.17)
DEPRECATED HCO3 PLAS-SCNC: 27 MMOL/L (ref 22–29)
EOSINOPHIL # BLD AUTO: 0.2 10E3/UL (ref 0–0.7)
EOSINOPHIL NFR BLD AUTO: 2 %
ERYTHROCYTE [DISTWIDTH] IN BLOOD BY AUTOMATED COUNT: 12.2 % (ref 10–15)
GFR SERPL CREATININE-BSD FRML MDRD: >90 ML/MIN/1.73M2
GLUCOSE SERPL-MCNC: 187 MG/DL (ref 70–99)
GLUCOSE UR STRIP-MCNC: NEGATIVE MG/DL
HCT VFR BLD AUTO: 44.6 % (ref 40–53)
HGB BLD-MCNC: 14.1 G/DL (ref 13.3–17.7)
HGB UR QL STRIP: ABNORMAL
HOLD SPECIMEN: NORMAL
IMM GRANULOCYTES # BLD: 0 10E3/UL
IMM GRANULOCYTES NFR BLD: 0 %
INR PPP: 0.92 (ref 0.85–1.15)
KETONES UR STRIP-MCNC: NEGATIVE MG/DL
LEUKOCYTE ESTERASE UR QL STRIP: ABNORMAL
LYMPHOCYTES # BLD AUTO: 1.6 10E3/UL (ref 0.8–5.3)
LYMPHOCYTES NFR BLD AUTO: 19 %
MCH RBC QN AUTO: 28.5 PG (ref 26.5–33)
MCHC RBC AUTO-ENTMCNC: 31.6 G/DL (ref 31.5–36.5)
MCV RBC AUTO: 90 FL (ref 78–100)
MONOCYTES # BLD AUTO: 0.5 10E3/UL (ref 0–1.3)
MONOCYTES NFR BLD AUTO: 5 %
NEUTROPHILS # BLD AUTO: 6.2 10E3/UL (ref 1.6–8.3)
NEUTROPHILS NFR BLD AUTO: 73 %
NITRATE UR QL: NEGATIVE
NRBC # BLD AUTO: 0 10E3/UL
NRBC BLD AUTO-RTO: 0 /100
PH UR STRIP: 6.5 [PH] (ref 5–7)
PLATELET # BLD AUTO: 185 10E3/UL (ref 150–450)
POTASSIUM SERPL-SCNC: 4.5 MMOL/L (ref 3.4–5.3)
RBC # BLD AUTO: 4.95 10E6/UL (ref 4.4–5.9)
RBC URINE: >182 /HPF
SODIUM SERPL-SCNC: 136 MMOL/L (ref 136–145)
SP GR UR STRIP: 1.02 (ref 1–1.03)
UROBILINOGEN UR STRIP-MCNC: NORMAL MG/DL
WBC # BLD AUTO: 8.5 10E3/UL (ref 4–11)
WBC URINE: >182 /HPF

## 2023-03-11 PROCEDURE — 85610 PROTHROMBIN TIME: CPT | Performed by: EMERGENCY MEDICINE

## 2023-03-11 PROCEDURE — 99284 EMERGENCY DEPT VISIT MOD MDM: CPT | Performed by: EMERGENCY MEDICINE

## 2023-03-11 PROCEDURE — 36415 COLL VENOUS BLD VENIPUNCTURE: CPT | Performed by: EMERGENCY MEDICINE

## 2023-03-11 PROCEDURE — 74177 CT ABD & PELVIS W/CONTRAST: CPT

## 2023-03-11 PROCEDURE — 85025 COMPLETE CBC W/AUTO DIFF WBC: CPT | Performed by: EMERGENCY MEDICINE

## 2023-03-11 PROCEDURE — 87186 SC STD MICRODIL/AGAR DIL: CPT | Performed by: EMERGENCY MEDICINE

## 2023-03-11 PROCEDURE — 74177 CT ABD & PELVIS W/CONTRAST: CPT | Mod: 26 | Performed by: RADIOLOGY

## 2023-03-11 PROCEDURE — 250N000011 HC RX IP 250 OP 636: Performed by: EMERGENCY MEDICINE

## 2023-03-11 PROCEDURE — 80048 BASIC METABOLIC PNL TOTAL CA: CPT | Performed by: EMERGENCY MEDICINE

## 2023-03-11 PROCEDURE — 99285 EMERGENCY DEPT VISIT HI MDM: CPT | Mod: 25 | Performed by: EMERGENCY MEDICINE

## 2023-03-11 PROCEDURE — 81001 URINALYSIS AUTO W/SCOPE: CPT | Performed by: EMERGENCY MEDICINE

## 2023-03-11 RX ORDER — IOPAMIDOL 755 MG/ML
111 INJECTION, SOLUTION INTRAVASCULAR ONCE
Status: COMPLETED | OUTPATIENT
Start: 2023-03-11 | End: 2023-03-11

## 2023-03-11 RX ORDER — CEPHALEXIN 500 MG/1
500 CAPSULE ORAL 4 TIMES DAILY
Qty: 28 CAPSULE | Refills: 0 | Status: SHIPPED | OUTPATIENT
Start: 2023-03-11 | End: 2023-03-18

## 2023-03-11 RX ADMIN — IOPAMIDOL 111 ML: 755 INJECTION, SOLUTION INTRAVENOUS at 06:14

## 2023-03-11 ASSESSMENT — ACTIVITIES OF DAILY LIVING (ADL)
ADLS_ACUITY_SCORE: 35

## 2023-03-11 NOTE — DISCHARGE INSTRUCTIONS
Take your antibiotics as prescribed.    Please follow-up with your primary care doctor.  Your doctor may need additional testing for the hematuria especially if it does not resolve with the antibiotics.    If you develop fevers, nausea vomiting or abdominal pain please return to the ER.  If you develop an inability to empty your bladder please return to the ER.    This is the number for your primary care doctor about I found:   Dr. SLOAN De Guzman [place] [phone]

## 2023-03-11 NOTE — PROGRESS NOTES
"Goal outcome evaluation:  Time: 05:30 - 0700  Plan of care reviewed with: Patient  Overall patient progress: No change  VS BP (!) 179/83   Pulse 74   Temp 98.1  F (36.7  C) (Oral)   Resp 18   Ht 1.702 m (5' 7\")   Wt 86.2 kg (190 lb)   SpO2 99%   BMI 29.76 kg/m        Activity: UAL   Neuros: A&O x4. Calls appropriately. Able to make needs known. Clear and appropriate speech. Follows instructions.   Cardiac: WDL. Denies chest pain  Respiratory: WDL  on RA. Denies SOB.  GI/: Voiding spontaneously bu with pain. No BM yet.  Lines/Drains: R PIV SL  Labs: Reviewed  Pain/Nausea: Pain with urination, no meds available as of now. No c/o nausea.  Plan: Continue POC      "

## 2023-03-11 NOTE — ED TRIAGE NOTES
Pt a/ox4 ambulatory to ED c/o blood in urine that started at 0100 today. Pt states after urinating has a burning, ripping sensation on penis. Pt stated had a BM pta and was normal.   Hx liver transplant.      Triage Assessment     Row Name 03/11/23 0432       Triage Assessment (Adult)    Airway WDL WDL       Respiratory WDL    Respiratory WDL WDL       Skin Circulation/Temperature WDL    Skin Circulation/Temperature WDL WDL       Cardiac WDL    Cardiac WDL WDL       Peripheral/Neurovascular WDL    Peripheral Neurovascular WDL WDL       Cognitive/Neuro/Behavioral WDL    Cognitive/Neuro/Behavioral WDL WDL

## 2023-03-11 NOTE — ED PROVIDER NOTES
ED Provider Note  [facility]      History     Chief Complaint   Patient presents with     Hematuria     HPI  Loy Limon is a 67 year old male who who has a past medical history significant for hepatocellular carcinoma status post liver transplant, diabetes, hypertension who presents with dysuria and hematuria noted 4 hours prior to arrival.  Patient reports that he had no symptoms preceding this and was in his usual state of health yesterday.  Woke up at 1 AM to urinate and noted pain in his urethra, hematuria passing clots and pain that went from his right lower quadrant up to his right upper quadrant only with urination.  Patient has no other abdominal pain.  No preceding dysuria.  No fevers, chills, nausea, vomiting, no abdominal pain.  He reports some ongoing pain in the tip of his penis but no suprapubic pain.  Reports that he feels slightly lightheaded since he has noted the hematuria but denies any other systemic symptoms.  Patient takes a baby aspirin and is not on any other blood thinners.  Has never experienced.  Hematuria in the past    Past Medical History  Past Medical History:   Diagnosis Date     Anemia      Biliary stricture of transplanted liver (H) 2018     BPH (benign prostatic hyperplasia)      Cancer (H)     hepatocellualr carcinoma     Cirrhosis of liver (H) 2018     Diabetes (H)      Enlarged prostate      Hypothyroidism      Impotence of organic origin 2020     Inguinal hernia     Repaired with mesh on 18     Liver lesion 2018     Liver transplanted (H) 2018     donor liver transplant     Portal vein thrombosis     on path explant     Postoperative atrial fibrillation (H) 2018     Prostate cancer (H)      TB lung, latent     Treated      Past Surgical History:   Procedure Laterality Date     APPENDECTOMY       COLONOSCOPY           CV CORONARY ANGIOGRAM N/A 10/13/2021    Procedure: CV CORONARY ANGIOGRAM;   "Surgeon: Yaya Hamtpon MD;  Location:  [facility]     CV CORONARY LITHOTRIPSY PCI N/A 10/13/2021    Procedure: CV Coronary Lithotripsy PCI;  Surgeon: aYya Hampton MD;  Location:  [facility]     CV INSTANTANEOUS WAVE-FREE RATIO N/A 10/13/2021    Procedure: Instantaneous Wave-Free Ratio;  Surgeon: Yaya Hampton MD;  Location:  [facility]     CV INTRAVASULAR ULTRASOUND N/A 10/13/2021    Procedure: Intravascular Ultrasound;  Surgeon: Yaya Hampton MD;  Location:  [facility]     CV PCI STENT DRUG ELUTING N/A 10/13/2021    Procedure: Percutaneous Coronary Intervention Stent Drug Eluting;  Surgeon: Yaya Hampton MD;  Location:  [facility]     DAVINCI PROSTATECTOMY N/A 1/3/2020    Procedure: Robot-assisted laparoscopic radical prostatectomy, Bladder biopsy;  Surgeon: Kenrick Casiano MD;  Location: [facility]     ENDOSCOPIC RETROGRADE CHOLANGIOPANCREATOGRAM N/A 2018    Procedure: ENDOSCOPIC RETROGRADE CHOLANGIOPANCREATOGRAM, with Billary Sphincterotomy and Billary Stent Placement;  Surgeon: Omero Lawler MD;  Location: [facility]     ENDOSCOPIC RETROGRADE CHOLANGIOPANCREATOGRAM  2019    Procedure: COMBINED ENDOSCOPIC RETROGRADE CHOLANGIOPANCREATOGRAPHY, Bile Duct Stent Exchange;  Surgeon: Omero Lawler MD;  Location: [facility]     ENDOSCOPIC RETROGRADE CHOLANGIOPANCREATOGRAM N/A 2019    Procedure: ENDOSCOPIC RETROGRADE CHOLANGIOPANCREATOGRAPHY, WITH BILIARY STENT REMOVAL AND STONE EXTRACTION;  Surgeon: Omero Lawler MD;  Location: [facility]     HERNIORRHAPHY INGUINAL  2018    Procedure: Herniorrhaphy inguinal;  Surgeon: Emil Echevarria MD;  Location: [facility]     IR LIVER BIOPSY PERCUTANEOUS  2018     LAPAROTOMY EXPLORATORY      per the patient \"for infection in the LLQ\"      TRANSPLANT LIVER RECIPIENT  DONOR N/A " 2018    Procedure: TRANSPLANT LIVER RECIPIENT  DONOR;  Surgeon: Emil Echevarria MD;  Location: [facility] OR     cephALEXin (KEFLEX) 500 MG capsule  amLODIPine (NORVASC) 5 MG tablet  aspirin (ASA) 81 MG EC tablet  atorvastatin (LIPITOR) 40 MG tablet  blood glucose (NO BRAND SPECIFIED) lancets standard  blood glucose (NO BRAND SPECIFIED) test strip  blood glucose (ONE TOUCH SURESOFT) lancing device  blood glucose monitoring (ONE TOUCH ULTRA 2) meter device kit  blood glucose monitoring (ONE TOUCH ULTRASOFT) lancets  Blood Pressure Monitor KIT  chlorthalidone (HYGROTON) 25 MG tablet  dulaglutide (TRULICITY) 1.5 MG/0.5ML pen  guaiFENesin-dextromethorphan (ROBITUSSIN DM) 100-10 MG/5ML syrup  insulin glargine (LANTUS PEN) 100 UNIT/ML pen  insulin pen needle (31G X 5 MM) 31G X 5 MM miscellaneous  levothyroxine (SYNTHROID/LEVOTHROID) 137 MCG tablet  losartan (COZAAR) 100 MG tablet  metFORMIN (GLUCOPHAGE XR) 500 MG 24 hr tablet  mycophenolate (GENERIC EQUIVALENT) 250 MG capsule  nitroGLYcerin (NITROSTAT) 0.4 MG sublingual tablet  predniSONE (DELTASONE) 5 MG tablet  ursodiol (ACTIGALL) 250 MG tablet  Vitamin D3 (CHOLECALCIFEROL) 25 mcg (1000 units) tablet      No Known Allergies  Family History  Family History   Problem Relation Age of Onset     Memory loss Mother      Liver Disease Brother      Skin Cancer No family hx of      Melanoma No family hx of      Social History   Social History     Tobacco Use     Smoking status: Former     Packs/day: 0.03     Years: 20.00     Pack years: 0.60     Types: Cigarettes, Cigars     Start date: 1970     Quit date: 3/14/2018     Years since quittin.9     Smokeless tobacco: Never     Tobacco comments:     Quit smoking 2018, one cigarette after dinner   Substance Use Topics     Alcohol use: No     Comment: Quit drinking 2018     Drug use: No         A medically appropriate review of systems was performed with pertinent positives and negatives noted in the HPI,  "and all other systems negative.    Physical Exam   BP: (!) 179/83  Pulse: 74  Temp: 98.1  F (36.7  C)  Resp: 18  Height: 170.2 cm (5' 7\")  Weight: 86.2 kg (190 lb)  SpO2: 99 %  Physical Exam  General: Patient is awake alert no acute distress  Cardiovascular regular rate and rhythm without murmur  Lungs: Clear to auscultation bilaterally  Back: No flank tenderness  Abdominal exam: Soft, nontender no suprapubic tenderness.  No guarding no peritoneal signs      ED Course, Procedures, & Data      Procedures          Results for orders placed or performed during the hospital encounter of 03/11/23   CT Abdomen Pelvis w Contrast     Status: None    Narrative    EXAM: CT ABDOMEN PELVIS W CONTRAST  LOCATION: [facility]  DATE/TIME: 3/11/2023 6:37 AM    INDICATION: hematuria  COMPARISON: None.  TECHNIQUE: CT scan of the abdomen and pelvis was performed following injection of IV contrast. Multiplanar reformats were obtained. Dose reduction techniques were used.  CONTRAST: iopamidol (ISOVUE 370) solution 111 mL   [    FINDINGS:   LOWER CHEST: Normal.    HEPATOBILIARY: No significant mass or bile duct dilatation. No calcified gallstones. The gallbladder is surgically absent.     PANCREAS: No significant mass, duct dilatation, or inflammatory change.    SPLEEN: Normal size.    ADRENAL GLANDS: No significant nodules.    KIDNEYS/BLADDER: No significant mass, stone, or hydronephrosis. The bladder is decompressed with circumferential narrowing thickening of the bladder wall and mild pericystic stranding (image 198, series 3).     BOWEL: Stomach and duodenum are normal in size and caliber. There are scattered air-fluid levels seen scattered throughout the jejunum without significant distention. There is stool seen throughout the colon.    LYMPH NODES: No lymphadenopathy.    VASCULATURE: No abdominal aortic aneurysm.    PELVIC ORGANS: No pelvic masses.    MUSCULOSKELETAL: Unremarkable.      " Impression    IMPRESSION:   1.  Mural thickening of the bladder which is at least in part exaggerated due to under distention of the bladder, with adjacent pericystic stranding, concerning for inflammatory process of the bladder such as cystitis, correlation with urinalysis is   recommended.  2.  Moderate amount of stool seen throughout the colon, correlate for constipation   [place] Draw     Status: None    Narrative    The following orders were created for panel order [place] Draw.  Procedure                               Abnormality         Status                     ---------                               -----------         ------                     Extra Blue Top Tube[[id number]]                              Final result               Extra Red Top Tube[[id number]]                               Final result               Extra Green Top (Lithium...[[id number]]                      Final result               Extra Purple Top Tube[[id number]]                            Final result                 Please view results for these tests on the individual orders.   Extra Blue Top Tube     Status: None   Result Value Ref Range    Hold Specimen JIC    Extra Red Top Tube     Status: None   Result Value Ref Range    Hold Specimen JIC    Extra Green Top (Lithium Heparin) Tube     Status: None   Result Value Ref Range    Hold Specimen JIC    Extra Purple Top Tube     Status: None   Result Value Ref Range    Hold Specimen JIC    Basic metabolic panel     Status: Abnormal   Result Value Ref Range    Sodium 136 136 - 145 mmol/L    Potassium 4.5 3.4 - 5.3 mmol/L    Chloride 100 98 - 107 mmol/L    Carbon Dioxide (CO2) 27 22 - 29 mmol/L    Anion Gap 9 7 - 15 mmol/L    Urea Nitrogen 18.9 8.0 - 23.0 mg/dL    Creatinine 0.86 0.67 - 1.17 mg/dL    Calcium 8.7 (L) 8.8 - 10.2 mg/dL    Glucose 187 (H) 70 - 99 mg/dL    GFR Estimate >90 >60 mL/min/1.73m2   UA with Microscopic reflex to Culture     Status: Abnormal    Specimen: Urine,  Midstream   Result Value Ref Range    Color Urine Light Yellow Colorless, Straw, Light Yellow, Yellow    Appearance Urine Clear Clear    Glucose Urine Negative Negative mg/dL    Bilirubin Urine Negative Negative    Ketones Urine Negative Negative mg/dL    Specific Gravity Urine 1.018 1.003 - 1.035    Blood Urine Large (A) Negative    pH Urine 6.5 5.0 - 7.0    Protein Albumin Urine 300 (A) Negative mg/dL    Urobilinogen Urine Normal Normal, 2.0 mg/dL    Nitrite Urine Negative Negative    Leukocyte Esterase Urine Small (A) Negative    RBC Urine >182 (H) <=2 /HPF    WBC Urine >182 (H) <=5 /HPF    Narrative    Urine Culture ordered based on laboratory criteria   CBC with platelets and differential     Status: None   Result Value Ref Range    WBC Count 8.5 4.0 - 11.0 10e3/uL    RBC Count 4.95 4.40 - 5.90 10e6/uL    Hemoglobin 14.1 13.3 - 17.7 g/dL    Hematocrit 44.6 40.0 - 53.0 %    MCV 90 78 - 100 fL    MCH 28.5 26.5 - 33.0 pg    MCHC 31.6 31.5 - 36.5 g/dL    RDW 12.2 10.0 - 15.0 %    Platelet Count 185 150 - 450 10e3/uL    % Neutrophils 73 %    % Lymphocytes 19 %    % Monocytes 5 %    % Eosinophils 2 %    % Basophils 1 %    % Immature Granulocytes 0 %    NRBCs per 100 WBC 0 <1 /100    Absolute Neutrophils 6.2 1.6 - 8.3 10e3/uL    Absolute Lymphocytes 1.6 0.8 - 5.3 10e3/uL    Absolute Monocytes 0.5 0.0 - 1.3 10e3/uL    Absolute Eosinophils 0.2 0.0 - 0.7 10e3/uL    Absolute Basophils 0.0 0.0 - 0.2 10e3/uL    Absolute Immature Granulocytes 0.0 <=0.4 10e3/uL    Absolute NRBCs 0.0 10e3/uL   INR     Status: Normal   Result Value Ref Range    INR 0.92 0.85 - 1.15   CBC with platelets differential     Status: None    Narrative    The following orders were created for panel order CBC with platelets differential.  Procedure                               Abnormality         Status                     ---------                               -----------         ------                     CBC with platelets and d...[[id number]]                       Final result                 Please view results for these tests on the individual orders.     Medications   iopamidol (ISOVUE-370) solution 111 mL (111 mLs Intravenous $Given 3/11/23 3042)     Labs Ordered and Resulted from Time of ED Arrival to Time of ED Departure   BASIC METABOLIC PANEL - Abnormal       Result Value    Sodium 136      Potassium 4.5      Chloride 100      Carbon Dioxide (CO2) 27      Anion Gap 9      Urea Nitrogen 18.9      Creatinine 0.86      Calcium 8.7 (*)     Glucose 187 (*)     GFR Estimate >90     ROUTINE UA WITH MICROSCOPIC REFLEX TO CULTURE - Abnormal    Color Urine Light Yellow      Appearance Urine Clear      Glucose Urine Negative      Bilirubin Urine Negative      Ketones Urine Negative      Specific Gravity Urine 1.018      Blood Urine Large (*)     pH Urine 6.5      Protein Albumin Urine 300 (*)     Urobilinogen Urine Normal      Nitrite Urine Negative      Leukocyte Esterase Urine Small (*)     RBC Urine >182 (*)     WBC Urine >182 (*)    INR - Normal    INR 0.92     CBC WITH PLATELETS AND DIFFERENTIAL    WBC Count 8.5      RBC Count 4.95      Hemoglobin 14.1      Hematocrit 44.6      MCV 90      MCH 28.5      MCHC 31.6      RDW 12.2      Platelet Count 185      % Neutrophils 73      % Lymphocytes 19      % Monocytes 5      % Eosinophils 2      % Basophils 1      % Immature Granulocytes 0      NRBCs per 100 WBC 0      Absolute Neutrophils 6.2      Absolute Lymphocytes 1.6      Absolute Monocytes 0.5      Absolute Eosinophils 0.2      Absolute Basophils 0.0      Absolute Immature Granulocytes 0.0      Absolute NRBCs 0.0     URINE CULTURE     CT Abdomen Pelvis w Contrast   Final Result   IMPRESSION:    1.  Mural thickening of the bladder which is at least in part exaggerated due to under distention of the bladder, with adjacent pericystic stranding, concerning for inflammatory process of the bladder such as cystitis, correlation with urinalysis is    recommended.   2.   Moderate amount of stool seen throughout the colon, correlate for constipation             Critical care was not performed.     Medical Decision Making  The patient's presentation was of moderate complexity (an acute illness with systemic symptoms).    The patient's evaluation involved:  review of external note(s) from 1 sources (see separate area of note for details)  ordering and/or review of 3+ test(s) in this encounter (see separate area of note for details)  review of 2 test result(s) ordered prior to this encounter (see separate area of note for details)  discussion of management or test interpretation with another health professional (see separate area of note for details)    The patient's management necessitated moderate risk (prescription drug management including medications given in the ED).      Assessment & Plan    Loy is a 67-year-old male who presents to the emergency department with gross hematuria and dysuria.  He is otherwise well-appearing, denies any abdominal pain or flank pain currently but did note pain radiated into the flank while passing urine.    On exam he is normal vital signs aside from hypertension, no flank tenderness, no abdominal or suprapubic tenderness.    On exam differential include things such as hemorrhagic cystitis, renal colic, and also consider something like a bladder or renal mass.    Initial evaluation include laboratory studies and CT imaging.    Laboratory studies notable for normal hemoglobin, normal platelets.  Urine with large amount of blood and white cells.     I did obtain a CT scan which showed no evidence of stone or pyelonephritis.  It did show thickening in the bladder with adjacent pericystic stranding concerning for inflammatory process in the bladder such as cystitis.  This would be consistent with his dysuria and penile burning.  I suspect he has hemorrhagic cystitis.  I did consult pharmacy given his immunocompromise status, reviewed local  cultures along with local prevalence and decided Keflex for treatment.  Patient will be instructed to take the Keflex, return for any worsening symptoms such as flank pain, fever, nausea, vomiting and follow-up with PCP.  He may or may not need additional work-up for gross hematuria.  Patient does note that the last time he urinated prior to discharge his urine had cleared up and he had no longer had hematuria.  Discussed signs and symptoms of urinary retention, should he develop the second of the hematuria he needs to return for repeat evaluation.    I have reviewed the nursing notes. I have reviewed the findings, diagnosis, plan and need for follow up with the patient.    Discharge Medication List as of 3/11/2023  7:49 AM      START taking these medications    Details   cephALEXin (KEFLEX) 500 MG capsule Take 1 capsule (500 mg) by mouth 4 times daily for 7 days, Disp-28 capsule, R-0, Local Print             Final diagnoses:   Hemorrhagic cystitis       Akhil Barrera  [facility] EMERGENCY DEPARTMENT  3/11/2023     Akhil Barrera MD  03/12/23 0057

## 2023-03-12 LAB — BACTERIA UR CULT: ABNORMAL

## 2023-03-13 DIAGNOSIS — E55.9 VITAMIN D DEFICIENCY: ICD-10-CM

## 2023-03-17 ENCOUNTER — APPOINTMENT (OUTPATIENT)
Dept: INTERPRETER SERVICES | Facility: CLINIC | Age: 68
End: 2023-03-17
Payer: COMMERCIAL

## 2023-03-20 ENCOUNTER — OFFICE VISIT (OUTPATIENT)
Dept: INTERNAL MEDICINE | Facility: CLINIC | Age: 68
End: 2023-03-20
Payer: COMMERCIAL

## 2023-03-20 ENCOUNTER — TELEPHONE (OUTPATIENT)
Dept: FAMILY MEDICINE | Facility: CLINIC | Age: 68
End: 2023-03-20

## 2023-03-20 ENCOUNTER — LAB (OUTPATIENT)
Dept: LAB | Facility: CLINIC | Age: 68
End: 2023-03-20
Payer: COMMERCIAL

## 2023-03-20 VITALS
OXYGEN SATURATION: 97 % | SYSTOLIC BLOOD PRESSURE: 151 MMHG | HEIGHT: 68 IN | DIASTOLIC BLOOD PRESSURE: 70 MMHG | HEART RATE: 69 BPM | BODY MASS INDEX: 30.4 KG/M2 | TEMPERATURE: 98.2 F | WEIGHT: 200.6 LBS

## 2023-03-20 DIAGNOSIS — R31.0 GROSS HEMATURIA: Primary | ICD-10-CM

## 2023-03-20 DIAGNOSIS — R31.0 GROSS HEMATURIA: ICD-10-CM

## 2023-03-20 DIAGNOSIS — I10 ESSENTIAL HYPERTENSION: ICD-10-CM

## 2023-03-20 LAB
ALBUMIN UR-MCNC: 300 MG/DL
APPEARANCE UR: CLEAR
BILIRUB UR QL STRIP: NEGATIVE
COLOR UR AUTO: ABNORMAL
GLUCOSE UR STRIP-MCNC: 50 MG/DL
HGB UR QL STRIP: ABNORMAL
KETONES UR STRIP-MCNC: NEGATIVE MG/DL
LEUKOCYTE ESTERASE UR QL STRIP: NEGATIVE
NITRATE UR QL: NEGATIVE
PH UR STRIP: 6 [PH] (ref 5–7)
RBC URINE: 5 /HPF
SP GR UR STRIP: 1.02 (ref 1–1.03)
SQUAMOUS EPITHELIAL: <1 /HPF
UROBILINOGEN UR STRIP-MCNC: NORMAL MG/DL
WBC URINE: 1 /HPF

## 2023-03-20 PROCEDURE — 81001 URINALYSIS AUTO W/SCOPE: CPT | Performed by: PATHOLOGY

## 2023-03-20 PROCEDURE — 99215 OFFICE O/P EST HI 40 MIN: CPT | Performed by: INTERNAL MEDICINE

## 2023-03-20 RX ORDER — AMLODIPINE BESYLATE 10 MG/1
10 TABLET ORAL DAILY
Qty: 90 TABLET | Refills: 1 | Status: SHIPPED | OUTPATIENT
Start: 2023-03-20 | End: 2023-04-04

## 2023-03-20 NOTE — PROGRESS NOTES
"History of Present Illness:  Mr. Limon is a 67 year old male who presents for  Chief Complaint   Patient presents with     RECHECK     Follow up on ear.  Discuss medications and labs.  Follow up on emergency visit 2 weeks ago.     PMH of HCC s/p liver tx, immunosuppressed, HTN, DM2, BPH, prostate cancer, latent Tb    Seen in ER earlier in month for hematuria, CT showed bladder thickening, small leuk est, +WBC/RBS, found to have E Chauncey UTI, treated with keflex x 7 days    He still feels like he has 10% of the infection, reports completing all abx, denies and repeat hematuria, has some burning after urination, no fevers/chills  NO hx of hematuria, no hx of UTI  Has prostatectomy in 2020, PSA in 10/22 was <0.01  He describes urinary incont since surgery, he uses \"plug\" of TP on outside of penis and diaper  No trouble emptying bladder, no pelvic pain   He was seeing nephrology for proteinuria.  He had microhematuria and on 7/9/21 he underwent normal cystoscopy with Dr. Leal  Cytology 7/21:  Urine, voided:     Atypical urothelial cells   Specimen Adequacy: Satisfactory for evaluation.   He also has an MR abdomen from 2/1/21 showing simple renal cortical cysts, but no pathology.    Urology    He also reports eating 2 grapefruits per day for las 3-4 months    BP is elevated today, he is on amlodipine, losartan  States he did not start chlorthalidone, as was prescribed by cardiology in Oct 2022      Review of external notes as documented above                   A detailed Review of Systems was performed, verified and is negative except as documented in the HPI.  All health questionnaires were reviewed, verified and relevant information documented above.    Past Medical History:  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/28/2018     BPH (benign prostatic hyperplasia)      Cancer (H)     hepatocellualr carcinoma     Cirrhosis of liver (H) 05/08/2018     Diabetes " (H)      Enlarged prostate      Hypothyroidism      Impotence of organic origin 2020     Inguinal hernia     Repaired with mesh on 18     Liver lesion 2018     Liver transplanted (H) 2018     donor liver transplant     Portal vein thrombosis     on path explant     Postoperative atrial fibrillation (H) 2018     Prostate cancer (H)      TB lung, latent     Treated        Past Surgical History:  Past Surgical History:   Procedure Laterality Date     APPENDECTOMY       COLONOSCOPY           CV CORONARY ANGIOGRAM N/A 10/13/2021    Procedure: CV CORONARY ANGIOGRAM;  Surgeon: Yaya Hampton MD;  Location:  [facility]     CV CORONARY LITHOTRIPSY PCI N/A 10/13/2021    Procedure: CV Coronary Lithotripsy PCI;  Surgeon: Yaya Hampton MD;  Location:  [facility]     CV INSTANTANEOUS WAVE-FREE RATIO N/A 10/13/2021    Procedure: Instantaneous Wave-Free Ratio;  Surgeon: Yaya Hampton MD;  Location:  [facility]     CV INTRAVASULAR ULTRASOUND N/A 10/13/2021    Procedure: Intravascular Ultrasound;  Surgeon: Yaya Hampton MD;  Location:  [facility]     CV PCI STENT DRUG ELUTING N/A 10/13/2021    Procedure: Percutaneous Coronary Intervention Stent Drug Eluting;  Surgeon: Yaya Hampton MD;  Location:  [facility]     DAVINCI PROSTATECTOMY N/A 1/3/2020    Procedure: Robot-assisted laparoscopic radical prostatectomy, Bladder biopsy;  Surgeon: Kenrick Casiano MD;  [facility]: [facility]     ENDOSCOPIC RETROGRADE CHOLANGIOPANCREATOGRAM N/A 2018    Procedure: ENDOSCOPIC RETROGRADE CHOLANGIOPANCREATOGRAM, with Billary Sphincterotomy and Billary Stent Placement;  Surgeon: Omero Lawler MD;  [facility]     ENDOSCOPIC RETROGRADE CHOLANGIOPANCREATOGRAM  2019    Procedure: COMBINED ENDOSCOPIC RETROGRADE  "CHOLANGIOPANCREATOGRAPHY, Bile Duct Stent Exchange;  Surgeon: Omero Lawler MD;  [facility]     ENDOSCOPIC RETROGRADE CHOLANGIOPANCREATOGRAM N/A 2019    Procedure: ENDOSCOPIC RETROGRADE CHOLANGIOPANCREATOGRAPHY, WITH BILIARY STENT REMOVAL AND STONE EXTRACTION;  Surgeon: Omero Lawler MD;  [facility]     HERNIORRHAPHY INGUINAL  2018    Procedure: Herniorrhaphy inguinal;  Surgeon: Emil Echevarria MD;  [facility]     IR LIVER BIOPSY PERCUTANEOUS  2018     LAPAROTOMY EXPLORATORY      per the patient \"for infection in the LLQ\"      TRANSPLANT LIVER RECIPIENT  DONOR N/A 2018    Procedure: TRANSPLANT LIVER RECIPIENT  DONOR;  Surgeon: Emil Echevarria MD;  [facility]       Active Meds:  Current Outpatient Medications   Medication     amLODIPine (NORVASC) 5 MG tablet     aspirin (ASA) 81 MG EC tablet     atorvastatin (LIPITOR) 40 MG tablet     blood glucose (NO BRAND SPECIFIED) lancets standard     blood glucose (NO BRAND SPECIFIED) test strip     blood glucose (ONE TOUCH SURESOFT) lancing device     blood glucose monitoring (ONE TOUCH ULTRA 2) meter device kit     blood glucose monitoring (ONE TOUCH ULTRASOFT) lancets     Blood Pressure Monitor KIT     chlorthalidone (HYGROTON) 25 MG tablet     dulaglutide (TRULICITY) 1.5 MG/0.5ML pen     guaiFENesin-dextromethorphan (ROBITUSSIN DM) 100-10 MG/5ML syrup     insulin glargine (LANTUS PEN) 100 UNIT/ML pen     insulin pen needle (31G X 5 MM) 31G X 5 MM miscellaneous     levothyroxine (SYNTHROID/LEVOTHROID) 137 MCG tablet     losartan (COZAAR) 100 MG tablet     metFORMIN (GLUCOPHAGE XR) 500 MG 24 hr tablet     mycophenolate (GENERIC EQUIVALENT) 250 MG capsule     nitroGLYcerin (NITROSTAT) 0.4 MG sublingual tablet     predniSONE (DELTASONE) 5 MG tablet     ursodiol (ACTIGALL) 250 MG tablet     Vitamin D3 (CHOLECALCIFEROL) 25 mcg (1000 units) tablet     No current facility-administered " "medications for this visit.        Allergies:  Patient has no known allergies.    Family History:  family history includes Liver Disease in his brother; Memory loss in his mother.    Social History:  Social History     Tobacco Use     Smoking status: Former     Packs/day: 0.03     Years: 20.00     Pack years: 0.60     Types: Cigarettes, Cigars     Start date: 1970     Quit date: 3/14/2018     Years since quittin.0     Smokeless tobacco: Never     Tobacco comments:     Quit smoking 2018, one cigarette after dinner   Substance Use Topics     Alcohol use: No     Comment: Quit drinking 2018     Drug use: No       Physical Exam:  Vitals: BP (!) 151/70 (BP Location: Right arm, Patient Position: Sitting, Cuff Size: Adult Large)   Pulse 69   Temp 98.2  F (36.8  C) (Oral)   Ht 1.727 m (5' 8\")   Wt 91 kg (200 lb 9.6 oz)   SpO2 97%   BMI 30.50 kg/m    Constitutional: Alert, oriented, pleasant, no acute distress  Head: Normocephalic, atraumatic  Eyes: Extra-ocular movements intact, pupils equally round and reactive bilaterally, no scleral icterus  Musculoskeletal: No edema, normal muscle tone, normal gait  Neurologic: Alert and oriented, cranial nerves 2-12 intact, grossly non-focal  Psychiatric: normal mentation, affect and mood      Diagnostics:  Labs reviewed in Epic          Assessment and Plan:  Loy was seen today for recheck.    Diagnoses and all orders for this visit:    Gross hematuria  Likely related to UTI, however, unclear what triggered UTI. Perhaps bladder dysfunction, though cystitis on CT may need to be followed, may need repeat cysto and/or cytology with Urology.   Less likely atypical infection (BK, adeno, tb) or renal pathology, though deserves attention on follow-up is symptoms don't improve.  -     UA Macro with Reflex to Micro and Culture - lab collect; Future  -     Adult Urology  Referral; Future    Essential hypertension  Will increase dose of amlodipine to achieve " goal BP <130  -     amLODIPine (NORVASC) 10 MG tablet; Take 1 tablet (10 mg) by mouth daily        Izabela Lima MD  Internal Medicine    >40 minutes spent today performing chart review, history and exam, counseling, care coordination, documentation and further activities as noted above exclusive of any procedures or EKG interpretation

## 2023-03-20 NOTE — NURSING NOTE
"Loy Limon is a 67 year old male patient that presents today in clinic for the following:    Chief Complaint   Patient presents with     RECHECK     Follow up on ear.  Discuss medications and labs.  Follow up on emergency visit 2 weeks ago.     The patient's allergies and medications were reviewed as noted. A set of vitals were recorded as noted without incident: BP (!) 151/70 (BP Location: Right arm, Patient Position: Sitting, Cuff Size: Adult Large)   Pulse 69   Temp 98.2  F (36.8  C) (Oral)   Ht 1.727 m (5' 8\")   Wt 91 kg (200 lb 9.6 oz)   SpO2 97%   BMI 30.50 kg/m  . The patient does not have any other questions for the provider.    Gibran Fairbanks, EMT at 9:35 AM on 3/20/2023.  Primary care clinic: [phone]  "

## 2023-03-20 NOTE — TELEPHONE ENCOUNTER
Please call patient with :    There was no bladder infection. Let us know if your symptoms worsen. Please follow up with Urology.      Thanks,  Izabela Lima MD  Internal Medicine

## 2023-03-20 NOTE — TELEPHONE ENCOUNTER
OCTAVIO Lofton,  Patient reports ingesting 2 grapefruits per day for last few months.  Can you let us know if this presents any drug interactions?    Thanks,  Izabela Lima MD  Internal Medicine

## 2023-03-20 NOTE — TELEPHONE ENCOUNTER
RN called patient and LVM with assistance of  to leave below message from Dr. Lima regarding results.     ADIA DICKSON RN on 3/20/2023 at 3:50 PM

## 2023-03-21 NOTE — TELEPHONE ENCOUNTER
It could potentially increase atorvastatin, prednisone, and amlodipine and potentially decrease the effect of losartan. The only one which may be clinically significant would be the interaction with atorvastatin which may still not be significant as the  recommends to avoid large quantities (>1.2 Liters) in combination with this medication. However smaller doses have caused increases in concentration. However grapefruit juice is much more concentrated than simply eating grapefruits so it is difficult to say.     Rolly

## 2023-03-22 ENCOUNTER — OFFICE VISIT (OUTPATIENT)
Dept: GASTROENTEROLOGY | Facility: CLINIC | Age: 68
End: 2023-03-22
Attending: INTERNAL MEDICINE
Payer: COMMERCIAL

## 2023-03-22 VITALS
TEMPERATURE: 98.3 F | WEIGHT: 199.7 LBS | DIASTOLIC BLOOD PRESSURE: 79 MMHG | BODY MASS INDEX: 30.36 KG/M2 | OXYGEN SATURATION: 98 % | SYSTOLIC BLOOD PRESSURE: 153 MMHG | HEART RATE: 65 BPM

## 2023-03-22 DIAGNOSIS — Z94.4 LIVER TRANSPLANT RECIPIENT (H): Primary | Chronic | ICD-10-CM

## 2023-03-22 PROCEDURE — 99215 OFFICE O/P EST HI 40 MIN: CPT | Performed by: INTERNAL MEDICINE

## 2023-03-22 PROCEDURE — G0463 HOSPITAL OUTPT CLINIC VISIT: HCPCS | Performed by: INTERNAL MEDICINE

## 2023-03-22 ASSESSMENT — PAIN SCALES - GENERAL: PAINLEVEL: NO PAIN (0)

## 2023-03-22 NOTE — PROGRESS NOTES
[facility] Hepatology    Follow-up Visit    CONSULTING HEALTHCARE PROVIDERS:  Jaswinder Anne MD/Izabela Lima MD     CHIEF COMPLAINT AND REASON FOR VISIT:  Status post liver transplantation.    SUBJECTIVE:  Mr. Limon is a 67-year-old Guamanian-speaking male with a history significant for alcoholic cirrhosis complicated by hepatocellular carcinoma, status post bridge therapy with TACE, who in 2018 received a  donor liver transplantation. On his explant, there was a viable tumor, and he still follows with Oncology, although there are no signs of recurrence since, which is almost 5 years.      He recently since I last saw him had dysuria and hematuria, and he was seen in the emergency room and followed with the primary team afterwards, where he was diagnosed with hemorrhagic cystitis. Etiology was not clear, but he was treated with antibiotics, and this has, according to him, since improved and no macroscopic hematuria.      Mr. Limon also gained since his transplantation around 22 pounds, which he mostly carries in his abdomen.  His liver function tests remained quasi normal.  He is very compliant with his medications, also.  Besides mild epigastric discomfort, he does not have any significant abdominal pain.  He has some nausea, but no vomiting, and he is moving his bowels at least once a day with no blood in it.      Mr. Limon also has diabetes mellitus and hypertension, for which he is on treatment.  He was last time advised to follow with his endocrinologist.  Mr. Limon has not seen Dermatology for a while, so he will need to see them.  He has an appointment, also, with Urology for his history of prostate cancer, and this is in 2023.  We will move that forward, as the patient is going to [place].    Medical hx Surgical hx   Past Medical History:   Diagnosis Date     Anemia      Biliary stricture of transplanted liver (H) 2018     BPH (benign prostatic hyperplasia)      Cancer (H)      hepatocellualr carcinoma     Cirrhosis of liver (H) 2018     Diabetes (H)      Enlarged prostate      Hypothyroidism      Impotence of organic origin 2020     Inguinal hernia     Repaired with mesh on 18     Liver lesion 2018     Liver transplanted (H) 2018     donor liver transplant     Portal vein thrombosis     on path explant     Postoperative atrial fibrillation (H) 2018     Prostate cancer (H)      TB lung, latent     Treated       Past Surgical History:   Procedure Laterality Date     APPENDECTOMY       COLONOSCOPY           CV CORONARY ANGIOGRAM N/A 10/13/2021    Procedure: CV CORONARY ANGIOGRAM;  Surgeon: Yaya Hampton MD;  Location:  [facility]     CV CORONARY LITHOTRIPSY PCI N/A 10/13/2021    Procedure: CV Coronary Lithotripsy PCI;  Surgeon: Yaya Hampton MD;  Location:  [facility]     CV INSTANTANEOUS WAVE-FREE RATIO N/A 10/13/2021    Procedure: Instantaneous Wave-Free Ratio;  Surgeon: Yaya Hampton MD;  Location:  [facility]     CV INTRAVASULAR ULTRASOUND N/A 10/13/2021    Procedure: Intravascular Ultrasound;  Surgeon: Yaya Hampton MD;  Location:  [facility]     CV PCI STENT DRUG ELUTING N/A 10/13/2021    Procedure: Percutaneous Coronary Intervention Stent Drug Eluting;  Surgeon: Yaya Hampton MD;  Location:  [facility]     DAVINCI PROSTATECTOMY N/A 1/3/2020    Procedure: Robot-assisted laparoscopic radical prostatectomy, Bladder biopsy;  Surgeon: Kenrick Casiano MD;  [facility]     ENDOSCOPIC RETROGRADE CHOLANGIOPANCREATOGRAM N/A 2018    Procedure: ENDOSCOPIC RETROGRADE CHOLANGIOPANCREATOGRAM, with Billary Sphincterotomy and Billary Stent Placement;  Surgeon: Omero Lawler MD;  [facility]     ENDOSCOPIC RETROGRADE CHOLANGIOPANCREATOGRAM  2019    Procedure:  "COMBINED ENDOSCOPIC RETROGRADE CHOLANGIOPANCREATOGRAPHY, Bile Duct Stent Exchange;  Surgeon: Omero Lawler MD;  [facility]     ENDOSCOPIC RETROGRADE CHOLANGIOPANCREATOGRAM N/A 2019    Procedure: ENDOSCOPIC RETROGRADE CHOLANGIOPANCREATOGRAPHY, WITH BILIARY STENT REMOVAL AND STONE EXTRACTION;  Surgeon: Omero Lawler MD;  [facility]     HERNIORRHAPHY INGUINAL  2018    Procedure: Herniorrhaphy inguinal;  Surgeon: Emil Echevarria MD;  [facility]     IR LIVER BIOPSY PERCUTANEOUS  2018     LAPAROTOMY EXPLORATORY      per the patient \"for infection in the LLQ\"      TRANSPLANT LIVER RECIPIENT  DONOR N/A 2018    Procedure: TRANSPLANT LIVER RECIPIENT  DONOR;  Surgeon: Emil Echevarria MD;  [facility]          Medications  Prior to Admission medications    Medication Sig Start Date End Date Taking? Authorizing Provider   amLODIPine (NORVASC) 10 MG tablet Take 1 tablet (10 mg) by mouth daily 3/20/23  Yes Izabela Lima MD   aspirin (ASA) 81 MG EC tablet Take 1 tablet (81 mg) by mouth daily for 180 days 10/28/22 4/26/23 Yes Miguel Ángel Hunter MD   atorvastatin (LIPITOR) 40 MG tablet Take 1 tablet (40 mg) by mouth daily 22  Yes Jaswinder Anne MD   insulin pen needle (31G X 5 MM) 31G X 5 MM miscellaneous Use one  pen needles daily or as directed. 3/8/23  Yes Leia Edward PA-C   levothyroxine (SYNTHROID/LEVOTHROID) 137 MCG tablet Take 1 tablet (137 mcg) by mouth daily 23  Yes Jaswinder Anne MD   losartan (COZAAR) 100 MG tablet Take 1 tablet (100 mg) by mouth daily 10/21/22  Yes Jacqueline Juarez NP   metFORMIN (GLUCOPHAGE XR) 500 MG 24 hr tablet Take 2 tablets (1,000 mg) by mouth 2 times daily (with meals) 3/9/23  Yes Jaswinder Anne MD   mycophenolate (GENERIC EQUIVALENT) 250 MG capsule TAKE 3 CAPSULES(750 MG) BY MOUTH TWICE DAILY 22  Yes Tamela Carballo MD   nitroGLYcerin " (NITROSTAT) 0.4 MG sublingual tablet For chest pain place 1 tablet under the tongue every 5 minutes for 3 doses. If symptoms persist 5 minutes after 1st dose call 911. 3/9/23  Yes Miguel Ángel Hunter MD   predniSONE (DELTASONE) 5 MG tablet Take 1 tablet (5 mg) by mouth daily 1/27/23  Yes Jaswinder Anne MD   ursodiol (ACTIGALL) 250 MG tablet Take 1 tablet (250 mg) by mouth 2 times daily 10/21/22  Yes Jacqueline Juarez NP   Vitamin D3 (CHOLECALCIFEROL) 25 mcg (1000 units) tablet Take 2 tablets (50 mcg) by mouth daily 10/21/22  Yes Jacqueline Juarez NP   blood glucose (NO BRAND SPECIFIED) lancets standard Use to test blood sugar 2 times daily or as directed.  Patient not taking: Reported on 10/28/2022 10/21/22   Jacqueline Juarez NP   blood glucose (NO BRAND SPECIFIED) test strip Use to test blood sugar 2 times daily or as directed. One touch ultra test strips  Patient not taking: Reported on 10/28/2022 10/27/22   Leia Edward PA-C   blood glucose (ONE TOUCH SURESOFT) lancing device Lancing device to be used with lancets.  Patient not taking: Reported on 10/28/2022 10/27/22   Leia Edward PA-C   blood glucose monitoring (ONE TOUCH ULTRA 2) meter device kit Use to test blood sugar 2 times daily or as directed.  Patient not taking: Reported on 10/28/2022 10/21/22   Jacqueline Juarez NP   blood glucose monitoring (ONE TOUCH ULTRASOFT) lancets Use to test blood sugar 2 times daily.  Patient not taking: Reported on 10/28/2022 10/27/22   Leia Edward PA-C   Blood Pressure Monitor KIT Automatic Blood Pressure Monitor  Patient not taking: Reported on 10/28/2022 10/21/22   Jacqueline Juarez NP   dulaglutide (TRULICITY) 1.5 MG/0.5ML pen Inject 1.5 mg Subcutaneous every 7 days  Patient not taking: Reported on 10/28/2022 10/27/22   Leia Edward PA-C   guaiFENesin-dextromethorphan (ROBITUSSIN DM) 100-10 MG/5ML syrup Take 10 mLs by mouth every 4 hours as needed for  cough  Patient not taking: Reported on 10/28/2022 10/21/22   Jacqueline Juarez, NP   insulin glargine (LANTUS PEN) 100 UNIT/ML pen Inject 20 Units Subcutaneous At Bedtime  Patient not taking: Reported on 10/28/2022 10/27/22   Leia Edward PA-C       Allergies  No Known Allergies    Review of systems  A 10-point review of systems was negative    Examination  BP (!) 153/79   Pulse 65   Temp 98.3  F (36.8  C) (Oral)   Wt 90.6 kg (199 lb 11.2 oz)   SpO2 98%   BMI 30.36 kg/m    Body mass index is 30.36 kg/m .    Gen- well, NAD, A+Ox3, normal color  Lym- no palpable LAD  CVS- RRR  RS- CTA  Abd- Healed surgical scar.  Extr- hands normal, no OSKAR  Skin- no rash or jaundice  Neuro- no asterixis  Psych- normal mood    Laboratory  Lab Results   Component Value Date     03/11/2023     07/07/2021    POTASSIUM 4.5 03/11/2023    POTASSIUM 4.5 07/27/2022    POTASSIUM 4.7 07/07/2021    CHLORIDE 100 03/11/2023    CHLORIDE 106 07/27/2022    CHLORIDE 106 07/07/2021    CO2 27 03/11/2023    CO2 26 07/27/2022    CO2 25 07/07/2021    BUN 18.9 03/11/2023    BUN 19 07/27/2022    BUN 26 07/07/2021    CR 0.86 03/11/2023    CR 0.79 07/07/2021       Lab Results   Component Value Date    BILITOTAL 0.6 01/27/2023    BILITOTAL 0.9 07/07/2021    ALT 21 01/27/2023    ALT 17 07/07/2021    AST 20 01/27/2023    AST 11 07/07/2021    ALKPHOS 166 01/27/2023    ALKPHOS 143 07/07/2021       Lab Results   Component Value Date    ALBUMIN 3.8 01/27/2023    ALBUMIN 3.1 07/27/2022    ALBUMIN 3.4 07/07/2021    PROTTOTAL 6.4 01/27/2023    PROTTOTAL 7.1 07/07/2021        Lab Results   Component Value Date    WBC 8.5 03/11/2023    WBC 7.0 07/07/2021    HGB 14.1 03/11/2023    HGB 14.0 07/07/2021    MCV 90 03/11/2023    MCV 83 07/07/2021     03/11/2023     07/07/2021       Lab Results   Component Value Date    INR 0.92 03/11/2023    INR 1.02 04/23/2021       Radiology    ASSESSMENT AND PLAN:  Status post liver transplantation.  .  Braxton received a liver transplantation in 2018.  Regarding that, he is doing quite well.  He is compliant with his medications.  It is almost 5 years since transplantation and patient had on the explant at that time a viable tumor. He followed with Oncology.  No recurrence is found since.      Mr. Limon also has other medical issues, which includes prostate cancer and this was managed by Urology.  He recently had hemorrhagic cystitis, which was treated with antibiotics and improved.  He has an appointment besides his primary care physicians with Urology in 07/2023.  We will try to move it earlier.      Mr. Torres regarding other healthcare maintenance issues will need to see Dermatology. We already sent a referral as he is at increased risk because of his immunosuppression.  His blood pressure is okay.  He is on medications.  For the diabetes, he will need to follow up with his PCP and also with Endocrinology.   He will also follow up with them for his other healthcare maintenance issues.  We are not going to make any changes in his management.  We will see him in 6 months as he is planning to go to [place] in October.    I spent 40 minutes on this encounter of 03/22/2003 in chart review, history taking, physical examination, and documentation.  I spent some of the time also in coordination of care and counseling.    Tamela Carballo MD  Hepatology  [facility]

## 2023-03-22 NOTE — NURSING NOTE
Chief Complaint   Patient presents with     RECHECK     6 mo f/u       BP (!) 153/79   Pulse 65   Temp 98.3  F (36.8  C) (Oral)   Wt 90.6 kg (199 lb 11.2 oz)   SpO2 98%   BMI 30.36 kg/m      Panfilo Koenig on 3/22/2023 at 7:45 AM

## 2023-03-22 NOTE — TELEPHONE ENCOUNTER
RECORDS RECEIVED FROM: Internal   REASON FOR VISIT: Memory change /Memory Care   Date of Appt: 6/14/23   NOTES (FOR ALL VISITS) STATUS DETAILS   OFFICE NOTE from referring provider Internal 1/27/23 Jaswinder Anne MD @ [facility]       MEDICATION LIST Internal    IMAGING  (FOR ALL VISITS)     MRI (HEAD, NECK, SPINE) N/A No relevant imaging   CT (HEAD, NECK, SPINE) N/A No relevant imaging

## 2023-03-22 NOTE — LETTER
3/22/2023         RE: Loy Limon  [address]  [facility] [address]        Dear Colleague,    Thank you for referring your patient, Loy Limon, to the [facility] [place]. Please see a copy of my visit note below.    [facility] Hepatology    Follow-up Visit    CONSULTING HEALTHCARE PROVIDERS:  Jaswinder Anne MD/Izabela Lima MD     CHIEF COMPLAINT AND REASON FOR VISIT:  Status post liver transplantation.    SUBJECTIVE:  Mr. Limon is a 67-year-old Maori-speaking male with a history significant for alcoholic cirrhosis complicated by hepatocellular carcinoma, status post bridge therapy with TACE, who in 2018 received a  donor liver transplantation. On his explant, there was a viable tumor, and he still follows with Oncology, although there are no signs of recurrence since, which is almost 5 years.      He recently since I last saw him had dysuria and hematuria, and he was seen in the emergency room and followed with the primary team afterwards, where he was diagnosed with hemorrhagic cystitis. Etiology was not clear, but he was treated with antibiotics, and this has, according to him, since improved and no macroscopic hematuria.      Mr. Limon also gained since his transplantation around 22 pounds, which he mostly carries in his abdomen.  His liver function tests remained quasi normal.  He is very compliant with his medications, also.  Besides mild epigastric discomfort, he does not have any significant abdominal pain.  He has some nausea, but no vomiting, and he is moving his bowels at least once a day with no blood in it.      Mr. Limon also has diabetes mellitus and hypertension, for which he is on treatment.  He was last time advised to follow with his endocrinologist.  Mr. Limon has not seen Dermatology for a while, so he will need to see them.  He has an appointment, also, with Urology for his history of prostate cancer, and this is in  2023.  We will move that forward, as the patient is going to [place].    Medical hx Surgical hx   Past Medical History:   Diagnosis Date     Anemia      Biliary stricture of transplanted liver (H) 2018     BPH (benign prostatic hyperplasia)      Cancer (H)     hepatocellualr carcinoma     Cirrhosis of liver (H) 2018     Diabetes (H)      Enlarged prostate      Hypothyroidism      Impotence of organic origin 2020     Inguinal hernia     Repaired with mesh on 18     Liver lesion 2018     Liver transplanted (H) 2018     donor liver transplant     Portal vein thrombosis     on path explant     Postoperative atrial fibrillation (H) 2018     Prostate cancer (H)      TB lung, latent     Treated       Past Surgical History:   Procedure Laterality Date     APPENDECTOMY       COLONOSCOPY           CV CORONARY ANGIOGRAM N/A 10/13/2021    Procedure: CV CORONARY ANGIOGRAM;  Surgeon: Yaya Hampton MD;  Location:  HEART CARDIAC CATH LAB     CV CORONARY LITHOTRIPSY PCI N/A 10/13/2021    Procedure: CV Coronary Lithotripsy PCI;  Surgeon: Yaya Hampton MD;  Location:  HEART CARDIAC CATH LAB     CV INSTANTANEOUS WAVE-FREE RATIO N/A 10/13/2021    Procedure: Instantaneous Wave-Free Ratio;  Surgeon: Yaya Hampton MD;  [facility]     CV INTRAVASULAR ULTRASOUND N/A 10/13/2021    Procedure: Intravascular Ultrasound;  Surgeon: Yaya Hampton MD;  [facility]ARD[facility]     CV PCI STENT DRUG ELUTING N/A 10/13/2021    Procedure: Percutaneous Coronary Intervention Stent Drug Eluting;  Surgeon: Yaya Hampton MD;  Location:  HEART CARDIAC CATH LAB     DAVINCI PROSTATECTOMY N/A 1/3/2020    Procedure: Robot-assisted laparoscopic radical prostatectomy, Bladder biopsy;  Surgeon: Kenrick Casiano MD;  Location: [facility]     ENDOSCOPIC RETROGRADE  "CHOLANGIOPANCREATOGRAM N/A 2018    Procedure: ENDOSCOPIC RETROGRADE CHOLANGIOPANCREATOGRAM, with Billary Sphincterotomy and Billary Stent Placement;  Surgeon: Omero Lawler MD;  [facility]     ENDOSCOPIC RETROGRADE CHOLANGIOPANCREATOGRAM  2019    Procedure: COMBINED ENDOSCOPIC RETROGRADE CHOLANGIOPANCREATOGRAPHY, Bile Duct Stent Exchange;  Surgeon: Omero Lawler MD;  [facility]     ENDOSCOPIC RETROGRADE CHOLANGIOPANCREATOGRAM N/A 2019    Procedure: ENDOSCOPIC RETROGRADE CHOLANGIOPANCREATOGRAPHY, WITH BILIARY STENT REMOVAL AND STONE EXTRACTION;  Surgeon: Omero Lawler MD;  [facility]     HERNIORRHAPHY INGUINAL  2018    Procedure: Herniorrhaphy inguinal;  Surgeon: Emil Echevarria MD;  [facility]     IR LIVER BIOPSY PERCUTANEOUS  2018     LAPAROTOMY EXPLORATORY      per the patient \"for infection in the LLQ\"      TRANSPLANT LIVER RECIPIENT  DONOR N/A 2018    Procedure: TRANSPLANT LIVER RECIPIENT  DONOR;  Surgeon: Emil Echevarria MD;  [facility]          Medications  Prior to Admission medications    Medication Sig Start Date End Date Taking? Authorizing Provider   amLODIPine (NORVASC) 10 MG tablet Take 1 tablet (10 mg) by mouth daily 3/20/23  Yes Izabela Lima MD   aspirin (ASA) 81 MG EC tablet Take 1 tablet (81 mg) by mouth daily for 180 days 10/28/22 4/26/23 Yes Miguel Ángel Hunter MD   atorvastatin (LIPITOR) 40 MG tablet Take 1 tablet (40 mg) by mouth daily 22  Yes Jaswinder Anne MD   insulin pen needle (31G X 5 MM) 31G X 5 MM miscellaneous Use one  pen needles daily or as directed. 3/8/23  Yes Leia Edward PA-C   levothyroxine (SYNTHROID/LEVOTHROID) 137 MCG tablet Take 1 tablet (137 mcg) by mouth daily 23  Yes Jaswinder Anne MD   losartan (COZAAR) 100 MG tablet Take 1 tablet (100 mg) by mouth daily 10/21/22  Yes Jacqueline Juarez, NP   metFORMIN (GLUCOPHAGE XR) " 500 MG 24 hr tablet Take 2 tablets (1,000 mg) by mouth 2 times daily (with meals) 3/9/23  Yes Jaswinder Anne MD   mycophenolate (GENERIC EQUIVALENT) 250 MG capsule TAKE 3 CAPSULES(750 MG) BY MOUTH TWICE DAILY 7/8/22  Yes Tamela Carballo MD   nitroGLYcerin (NITROSTAT) 0.4 MG sublingual tablet For chest pain place 1 tablet under the tongue every 5 minutes for 3 doses. If symptoms persist 5 minutes after 1st dose call 911. 3/9/23  Yes Miguel Ángel Hunter MD   predniSONE (DELTASONE) 5 MG tablet Take 1 tablet (5 mg) by mouth daily 1/27/23  Yes Jaswinder Anne MD   ursodiol (ACTIGALL) 250 MG tablet Take 1 tablet (250 mg) by mouth 2 times daily 10/21/22  Yes Jacqueline Juarez NP   Vitamin D3 (CHOLECALCIFEROL) 25 mcg (1000 units) tablet Take 2 tablets (50 mcg) by mouth daily 10/21/22  Yes Jacqueline Juarez NP   blood glucose (NO BRAND SPECIFIED) lancets standard Use to test blood sugar 2 times daily or as directed.  Patient not taking: Reported on 10/28/2022 10/21/22   Jacqueline Juarez NP   blood glucose (NO BRAND SPECIFIED) test strip Use to test blood sugar 2 times daily or as directed. One touch ultra test strips  Patient not taking: Reported on 10/28/2022 10/27/22   Leia Edward PA-C   blood glucose (ONE TOUCH SURESOFT) lancing device Lancing device to be used with lancets.  Patient not taking: Reported on 10/28/2022 10/27/22   Leia Edward PA-C   blood glucose monitoring (ONE TOUCH ULTRA 2) meter device kit Use to test blood sugar 2 times daily or as directed.  Patient not taking: Reported on 10/28/2022 10/21/22   Jacqueline Juarez NP   blood glucose monitoring (ONE TOUCH ULTRASOFT) lancets Use to test blood sugar 2 times daily.  Patient not taking: Reported on 10/28/2022 10/27/22   Leia Edward PA-C   Blood Pressure Monitor KIT Automatic Blood Pressure Monitor  Patient not taking: Reported on 10/28/2022 10/21/22   Jacqueline Juarez NP dulaglutide  (TRULICITY) 1.5 MG/0.5ML pen Inject 1.5 mg Subcutaneous every 7 days  Patient not taking: Reported on 10/28/2022 10/27/22   Leia Edward PA-C   guaiFENesin-dextromethorphan (ROBITUSSIN DM) 100-10 MG/5ML syrup Take 10 mLs by mouth every 4 hours as needed for cough  Patient not taking: Reported on 10/28/2022 10/21/22   Jacqueline Juarez, NP   insulin glargine (LANTUS PEN) 100 UNIT/ML pen Inject 20 Units Subcutaneous At Bedtime  Patient not taking: Reported on 10/28/2022 10/27/22   Leia Edward PA-C       Allergies  No Known Allergies    Review of systems  A 10-point review of systems was negative    Examination  BP (!) 153/79   Pulse 65   Temp 98.3  F (36.8  C) (Oral)   Wt 90.6 kg (199 lb 11.2 oz)   SpO2 98%   BMI 30.36 kg/m    Body mass index is 30.36 kg/m .    Gen- well, NAD, A+Ox3, normal color  Lym- no palpable LAD  CVS- RRR  RS- CTA  Abd- Healed surgical scar.  Extr- hands normal, no OSKAR  Skin- no rash or jaundice  Neuro- no asterixis  Psych- normal mood    Laboratory  Lab Results   Component Value Date     03/11/2023     07/07/2021    POTASSIUM 4.5 03/11/2023    POTASSIUM 4.5 07/27/2022    POTASSIUM 4.7 07/07/2021    CHLORIDE 100 03/11/2023    CHLORIDE 106 07/27/2022    CHLORIDE 106 07/07/2021    CO2 27 03/11/2023    CO2 26 07/27/2022    CO2 25 07/07/2021    BUN 18.9 03/11/2023    BUN 19 07/27/2022    BUN 26 07/07/2021    CR 0.86 03/11/2023    CR 0.79 07/07/2021       Lab Results   Component Value Date    BILITOTAL 0.6 01/27/2023    BILITOTAL 0.9 07/07/2021    ALT 21 01/27/2023    ALT 17 07/07/2021    AST 20 01/27/2023    AST 11 07/07/2021    ALKPHOS 166 01/27/2023    ALKPHOS 143 07/07/2021       Lab Results   Component Value Date    ALBUMIN 3.8 01/27/2023    ALBUMIN 3.1 07/27/2022    ALBUMIN 3.4 07/07/2021    PROTTOTAL 6.4 01/27/2023    PROTTOTAL 7.1 07/07/2021        Lab Results   Component Value Date    WBC 8.5 03/11/2023    WBC 7.0 07/07/2021    HGB 14.1 03/11/2023    HGB 14.0  07/07/2021    MCV 90 03/11/2023    MCV 83 07/07/2021     03/11/2023     07/07/2021       Lab Results   Component Value Date    INR 0.92 03/11/2023    INR 1.02 04/23/2021       Radiology    ASSESSMENT AND PLAN:  Status post liver transplantation.  Mr. Limon received a liver transplantation in 2018.  Regarding that, he is doing quite well.  He is compliant with his medications.  It is almost 5 years since transplantation and patient had on the explant at that time a viable tumor. He followed with Oncology.  No recurrence is found since.      Mr. Limon also has other medical issues, which includes prostate cancer and this was managed by Urology.  He recently had hemorrhagic cystitis, which was treated with antibiotics and improved.  He has an appointment besides his primary care physicians with Urology in 07/2023.  We will try to move it earlier.      Mr. Torres regarding other healthcare maintenance issues will need to see Dermatology. We already sent a referral as he is at increased risk because of his immunosuppression.  His blood pressure is okay.  He is on medications.  For the diabetes, he will need to follow up with his PCP and also with Endocrinology.   He will also follow up with them for his other healthcare maintenance issues.  We are not going to make any changes in his management.  We will see him in 6 months as he is planning to go to [place] in October.    I spent 40 minutes on this encounter of 03/22/2003 in chart review, history taking, physical examination, and documentation.  I spent some of the time also in coordination of care and counseling.    Tamela Carballo MD  Hepatology  [facility]

## 2023-03-29 RX ORDER — VITAMIN B COMPLEX
50 TABLET ORAL DAILY
Qty: 180 TABLET | Refills: 3 | Status: SHIPPED | OUTPATIENT
Start: 2023-03-29 | End: 2024-03-02

## 2023-03-30 NOTE — PROGRESS NOTES
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      He also has a history of cirrhosis with HCC, portal HTN, latent TB and underwent living donor liver transplant on 12/22/2018. He developed steroid/immunosuppressant induced diabetes and was treated with NPH insulin, which was discontinued once he was no longer taking steroids.  He also has hypertension hyperlipidemia, a history of smoking and prostate cancer developed in 2019 and is status post RALP in 2020.    .    He last saw Marcy in August 2021, he was taking Metformin 1000 mg twice daily, Januvia 25 mg daily, Lantus 10 units 2x daily. His average blood sugar was 257 without any hypoglycemia and a GMI of 9.5 we will increase his Lantus to 15 units in the morning and 15 in the evening and discussed how he might reduce his carbohydrate intake.    I met Jennie in Sept 2021:  A1C 7.9 on same meds x reduced Lantus to 10 units once daily. Discussed possible GLP-1 agonist or SGLT2-i in place of Januvia and Lantus.  Noted overweight.      The patient's risk factor profile is: (+) HTN, (+) diabetes, (+) hyperlipidemia, (+) prior tobacco use, (-) family Hx CAD.     February 2022: A1c had risen to 10 in January.  We stopped Januvia and we started Trulicity at 0.75 mg weekly.   Advised him to increase this to 1.5 mg and 1 month if tolerated.    October 2022: Most recent A1c was elevated but he reported blood sugars near goal some months prior, not checking at the time of her visit.  He did have ongoing elevated blood pressure and we did increase his amlodipine.  Advised he update his TSH, referral for eye exam, advised he to follow-up his blood pressure with his primary care provider.  Asked to have to help him reschedule his missed  nephrology appointment.      Interim:  He did meet with nephrology for evaluation of his proteinuria and they did discuss doing a renal biopsy once his blood pressure and A1c are at goal.  His losartan was increased to 100 mg daily and they considered resuming Toprol if blood pressure not at goal.  Dr. Briggs did refer him to neurology to evaluate memory change.  A1c in January was 9.0      Today:    He did see a lower BG 82 upon awakening but most BG are higher.  He doesn't give insulin if BG <130, if >200 puts 30 units.  He quit taking Trulicity even though his BG was perfect with this, but his vision became blurry.  Once stopped 3 days later his vision was perfect.        Meds : Amlodipine 5 mg  Losartan 50 mg petersen   Prednisone 5 mg  Levothyroixine 125 mcg  - taking fasting -eats 15 minutes later  Metfromin 500 mg ER 2 tabs bid  Lantus  - gives 0-30 units.      Diabetes complication:  No retinopathy.  Exam updated in October 2022.  Nephropathy currently being evaluated by nephrology.  No known neuropathy.  No known coronary artery disease.        Glucometer data:  Average blood sugars 188 with standard deviation of 35 mg/dL.  Range is 127 - 232.  Checking blood sugar 0-3 times daily on average 0.7 times per day.         Loy's PMH, PSH and allergies were reviewed today and pertinent information is summarized above.  He lives with his wife Drea.  He does have children in the area but they generally they do not help him with getting medications appointments or technology.    Loy's pertinent social and family history are also reviewed today and pertinent information is summarized above.    Current Outpatient Medications   Medication     aspirin (ASA) 81 MG EC tablet     atorvastatin (LIPITOR) 40 MG tablet     blood glucose (NO BRAND SPECIFIED) lancets standard     blood glucose (NO BRAND SPECIFIED) test strip     blood glucose (ONE TOUCH SURESOFT) lancing device     blood glucose monitoring (ONE TOUCH  ULTRA 2) meter device kit     blood glucose monitoring (ONE TOUCH ULTRASOFT) lancets     insulin glargine (LANTUS PEN) 100 UNIT/ML pen     insulin pen needle (31G X 5 MM) 31G X 5 MM miscellaneous     levothyroxine (SYNTHROID/LEVOTHROID) 137 MCG tablet     losartan (COZAAR) 100 MG tablet     metFORMIN (GLUCOPHAGE XR) 500 MG 24 hr tablet     mycophenolate (GENERIC EQUIVALENT) 250 MG capsule     nitroGLYcerin (NITROSTAT) 0.4 MG sublingual tablet     predniSONE (DELTASONE) 5 MG tablet     ursodiol (ACTIGALL) 250 MG tablet     Vitamin D3 (CHOLECALCIFEROL) 25 mcg (1000 units) tablet     amLODIPine (NORVASC) 10 MG tablet     Blood Pressure Monitor KIT     dulaglutide (TRULICITY) 1.5 MG/0.5ML pen     guaiFENesin-dextromethorphan (ROBITUSSIN DM) 100-10 MG/5ML syrup     No current facility-administered medications for this visit.         ROS:   Patient denies any fevers, chills or sweats as well as any changes in vision, problems with floaters or field cuts in vision, pain or problems with dentition, new or different headaches.  Patient denies symptoms of hypo and hyperglycemia except as above.   Patients denies marked fatigue, cough, shortness of breath, chest pain or pressure.  There has been no pain with or other changes in urination or  itching or pain in genital areas.  Patient denies any noted swelling in feet, ankles or otherwise, loss of sensation or pain  in feet or other areas.    Patient also denies current difficulties with depressed mood, anhedonia or worrying too much.        Exam:    BP (!) 157/79 (BP Location: Right arm, Patient Position: Sitting, Cuff Size: Adult Regular)   Pulse 65   Wt 90.7 kg (200 lb)   BMI 30.41 kg/m      Wt Readings from Last 10 Encounters:   04/04/23 90.7 kg (200 lb)   03/22/23 90.6 kg (199 lb 11.2 oz)   03/20/23 91 kg (200 lb 9.6 oz)   03/11/23 86.2 kg (190 lb)   01/27/23 89.8 kg (197 lb 14.4 oz)   10/28/22 89.4 kg (197 lb)   10/26/22 88.5 kg (195 lb)   10/21/22 87.2 kg (192 lb 3.2  "oz)   10/04/22 90.2 kg (198 lb 12.8 oz)   09/22/22 90.3 kg (199 lb)       General: Pleasant, well nourished and hydrated male in NAD.   Psych:  Mood is \"good,\" affect is appropriate.  Thought form and content are fluid and coherent.    HEENT: Eyes and sclera are clear. Extraocular movements are grossly intact without proptosis.  Nares are patent, mucous membranes moist.  Neck: No masses or JVD are noted.    Resp: Easy and unlabored breathing.   Neuro: Alert and oriented, communicating clearly.   Ext: no swelling or edema.  Good distal pulses and capillary refill in digits.  Skin in good condition.  Sensation is intact to monofilament testing bilaterally and throughout.    Data:      Recent Labs   Lab Test 03/11/23  0434 01/27/23  1341 12/27/22  0917 10/21/22  1019 10/04/22  1101 03/02/22  1442 01/25/22  0858 01/25/22  0851 10/13/21  0905 09/13/21  0848 03/24/21  0812 02/12/21  0000 02/01/21  0723 09/25/20  0826   A1C  --  9.0*  --  11.1*  --    < >  --  10.0*  --  7.9*   < >  --   --   --    HEMOGLOBINA1  --   --   --   --   --   --   --   --   --   --   --  12.1*  --   --    TSH  --  9.80*  --   --  5.43*   < >  --  12.29*  --  4.07*   < >  --   --   --    T4  --  1.03  --   --  1.54   < >  --  1.00  --  1.00   < >  --   --   --    LDL  --   --   --   --   --   --   --  74  --  311*   < >  --   --   --    HDL  --   --   --   --   --   --   --  57  --  71   < >  --   --   --    TRIG  --   --   --   --   --   --   --  136  --  90   < >  --   --   --    CR 0.86 0.92   < > 0.83  --    < >  --  0.80   < > 0.89   < >  --    < >  --    MICROL  --   --   --   --   --   --  5,750  --   --   --   --   --   --  764    < > = values in this interval not displayed.     Hemoglobin   Date Value Ref Range Status   03/11/2023 14.1 13.3 - 17.7 g/dL Final   07/07/2021 14.0 13.3 - 17.7 g/dL Final   ]    Most recent GFRs: Greater than 90 in July 2022.          Assessment/Plan:    Loy Limon is a 67 year old male with type 2 " diabetes that was at goal when his A1c was last checked in July.    1 Diabetes -recent A1c 9.0 and blood sugars today above goal.  Advised to consistently take Lantus 20 units daily - increase by 1 unit each week until fasting  - 150.  Prolonged time spent outlining this plan and Bubba Limon is able to repeat this back to me.  Even though it has blood sugars were nearly perfect on Trulicity he discontinued it as he had increasingly blurred vision.  Chandra the option of trying a different GLP-1 agonist versus returning to Januvia and he elected to resume Januvia.    2. Hypertension: Blood pressures are above goal today on losartan alone .  Advised to resume Amlodipine and recheck blood pressure.        2. DM Complications-     Lifestyle modification focusing on application of a Mediterranean diet or Dietary Approaches to Stop Hypertension (DASH) dietary pattern; reduction of saturated fat and trans fat; increase of dietary n-3 fatty acids, viscous fiber, and plant stanols/sterols intake; and increased physical activity recommended to improve the lipid profile and reduce the risk of developing atherosclerotic cardiovascular disease.     Retinopathy:   No known retinopathy; exam up-to-date done in October 2022.  Nephropathy:   Has a history of proteinuria and elevated blood pressure, urged her to reschedule with nephrology at his last visit was canceled due to weather asking staff to assist him with this.  Placed another referral in hopes that this would help this be scheduled.  Neuropathy: No.    Feet: OK, no ulcers or lesions.   Lipids:10-year atherosclerotic cardiovascular disease risk >20%.  Patient taking a statin.      50 minutes spent on the date of the encounter doing chart review, history and exam, documentation, education and counseling, as well as communication and coordination of care, and further activities as noted above.  This time excludes time spent reviewing CGM.      It is my privilege to be  involved in the care of the above patient.     Leia Edward PA-C, MPAS  [facility]  Diabetes, Endocrinology, and Metabolism  [phone] Appointments/Nurse  [phone] pager  [phone] nurse line    Chart accessed 13 minutes    Exam room 29 minutes    Current session 8 minutes    Total time: 50 minutes*       This note was completed in part using Dragon voice recognition, and may contain word and grammatical errors.        Patient is showing 3/5 MNCM met. A1c not in range   Olamide Renee, VF

## 2023-04-03 ENCOUNTER — TELEPHONE (OUTPATIENT)
Dept: FAMILY MEDICINE | Facility: CLINIC | Age: 68
End: 2023-04-03
Payer: COMMERCIAL

## 2023-04-04 ENCOUNTER — OFFICE VISIT (OUTPATIENT)
Dept: ENDOCRINOLOGY | Facility: CLINIC | Age: 68
End: 2023-04-04
Payer: COMMERCIAL

## 2023-04-04 VITALS
DIASTOLIC BLOOD PRESSURE: 79 MMHG | SYSTOLIC BLOOD PRESSURE: 157 MMHG | HEART RATE: 65 BPM | WEIGHT: 200 LBS | BODY MASS INDEX: 30.41 KG/M2

## 2023-04-04 DIAGNOSIS — I25.118 CORONARY ARTERY DISEASE OF NATIVE ARTERY OF NATIVE HEART WITH STABLE ANGINA PECTORIS (H): Primary | ICD-10-CM

## 2023-04-04 DIAGNOSIS — G63 POLYNEUROPATHY ASSOCIATED WITH UNDERLYING DISEASE (H): ICD-10-CM

## 2023-04-04 DIAGNOSIS — I10 ESSENTIAL HYPERTENSION: ICD-10-CM

## 2023-04-04 DIAGNOSIS — K70.30: ICD-10-CM

## 2023-04-04 DIAGNOSIS — E11.9 DM TYPE 2, GOAL HBA1C 7%-8% (H): ICD-10-CM

## 2023-04-04 DIAGNOSIS — D84.821 IMMUNODEFICIENCY DUE TO DRUGS (CODE) (H): ICD-10-CM

## 2023-04-04 DIAGNOSIS — R80.9 MICROALBUMINURIA: ICD-10-CM

## 2023-04-04 PROCEDURE — 99215 OFFICE O/P EST HI 40 MIN: CPT | Performed by: PHYSICIAN ASSISTANT

## 2023-04-04 RX ORDER — AMLODIPINE BESYLATE 10 MG/1
10 TABLET ORAL DAILY
Qty: 90 TABLET | Refills: 1 | Status: SHIPPED | OUTPATIENT
Start: 2023-04-04 | End: 2023-07-20

## 2023-04-04 NOTE — PATIENT INSTRUCTIONS
Estimado Don Limon,  Por favor recoge Amlodipine de la farmacia y tome diario para samaniego presion sanguina.    Kevon Lantus 20 unidades diarios.  Si a la semana la glucose nop se quesde en 100 - 150 en ayunas vaya subiendo la dosis 1 unidad mas cada semana hasta que esta en 100 - 150.      [place]:  Glucose en ayunas : 100 - 150  En la noche : 90 - 200.    It is good to speak with you today!  I am here to support your health care and life goals by helping you to manage your diabetes and prevent complications.  Please let me know of anything more I might do to support this and review below to be aware of recommendations and resources that might apply to you and your situation.  With deep appreciation and my best wishes for great [place],  Leia      Please contact us to schedule at any of our [facility] locations  Call [phone] ([phone]), select option [phone].    For [facility] lab at [address] [place] [address], call : [phone].    Please be sure to get your eye exam done annually as well as microalbuminuria test at lab if you do not see a nephrologist.      Our diabetic foot providers, Dr. Nelson and Jeannine are also available on a limited basis to see patients with pedal ulcers or other concerns.       Please work to meet recommendations for physical activity which is 30 minutes aerobic activity at 6 days each week and resistance or weight training 2-3 times /week.  If you are >60 years old, please be sure you are including exercises to help you maintain good balance and prevent falls as you continue to age.  Please advise if you you would like a referral to physical therapy to assist you in developing a personalized and appropriate program for yourself.     If you are on insulin and especially if you ever have any blood sugars <55 or blood sugars <70 that  you have a hard time treating on your own, we need a plan to reduce this, but you also MUST ALSO HAVE a prescription for GLUCAGON, nasal inhaled or injectable.  Please  let me know right away, if you need this refilled.      Also many heart healthy food ideas are available at:  [email or web address].diabetesfoodhub.org/    If you do not already have a primary care provider it is very important that you do make an appointment to establish care with a family medicine, internal medicine, adult medicine, or med/peds primary care provider.  To establish primary care at [facility] with an Internal Medicine Physician, please call [phone] and ask for Internal Medicine/Primary Care clinic appointment.    Diabetes Education and Nutrition providers are also available to support you in matching your lifestyle and health goals as well as keep up with technology that can help your blood sugar management less burdensome and time consuming while getting better results!  Please let us know any time you would like to schedule with nutrition or diabetes educator; most insurers cover 2 - 6 hours of education annually and I see better outcomes in patients who take advantage of this.  Knowledge is power of course!    Blood pressure management is a VERY IMPORTANT part of disease management.  If you are having higher blood pressures in clinic >140/90, PLEASE GET A HOME BLOOD PRESSURE cuff and check your blood sugar after sitting for 5 minutes.  If it is >140/90 seated resting at home it is imperative that you let me, your general practitioner or nephrologist know so that we can make a plan to address this.    Did you know that persons with diabetes have significantly increased rates of both depression and anxiety?  This can also increase your risk for cardiovascular disease and is treatable and manageable!  Health Psychology is a specialty that helps people cope with the stress and anxiety that often occurs with illness, emotional and psychological issues, and preparation for medical treatment including surgical procedures.  We have a number of great providers here at [facility], including Dr. Torrei Jacob  who specializes in health psychology and behavioral medicine with clinical expertise in stress-related and inflammatory diseases, diabetes, obesity, and addressing health inequities. Information to schedule with her is below or please feel free to discuss your concerns with me at your next appointment.      HEALTH PSYCHOLOGY    What is Health Psychology?  Health Psychology is a specialty that helps people cope with the stress and anxiety that often occurs with illness, emotional and psychological issues, and preparation for medical treatment including surgical procedures.    Telehealth services are now being provided to patients.    Location:    [facility]                       [address] [place] [id number]                     Please arrive 15 minutes early to check in for in-person appointments.       We can help patients affected by  Adjustment problems  Anxiety  Cancer  Cardiovascular disorders  Chronic digestive disorders  Depression  Diabetes  Disability  Health-related behavior change  Insomnia  Other medical and nervous system conditions  People experiencing or wishing to prevent health problems  Pulmonary/lung conditions  Smoking cessation  Transplants    Treatments we offer include  Psychological assessment  Psychotherapy/counseling  Cognitive behavioral therapy  Relaxation training  Behavior therapy  Motivational interviewing  Stress management    How We Help  Coping: We help people with the emotional issues related to their illness.  Challenges: Difficult challenges seem to increase during illness. We teach steps and strategies for managing stressful situations.  Feelings: We help people deal with anger, anxiety, confusion, depression, fear, frustration, grief, loss of control, sadness, uncertainty, and motivational issues.  Behaviors: When people are ill, it can be harder to take care of themselves. We help people manage weight, follow health regimens, relax, and quit  smoking.  Counseling: We offer several types of counseling and can create a plan that meets needs of a patient. Our practice works with individuals, couples, and families.    Our Care Team  Working with primary and specialty physicians at [facility] and [facility] [place], we see patients in the hospital and clinic, allowing us to coordinate our services with the rest of people's medical needs.       To Schedule an Appointment  New patient appointments are generally scheduled through the Central Scheduling number: [phone].        NOTE: Services are confidential. Insurance coverage varies. Mental health benefits of health plans may have different levels of coverage than medical benefits. Please confirm the coverage details with the insurance plan or our business office.      My best wishes,    Leia Edward PA-C, MPAS  [facility]  Diabetes, Endocrinology, and Metabolism  [phone] Appointments/Nurse  [phone] Fax  [phone] nurse line  [phone] URGENTafter hours/weekend Endocrinologist on call

## 2023-04-04 NOTE — LETTER
4/4/2023       RE: Loy Limon  [address]  [place] [address]     Dear Colleague,    Thank you for referring your patient, Loy Limon, to the [facility]OCRINOLOG[facility] [place] at [facility]. Please see a copy of my visit note below.               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      He also has a history of cirrhosis with HCC, portal HTN, latent TB and underwent living donor liver transplant on 12/22/2018. He developed steroid/immunosuppressant induced diabetes and was treated with NPH insulin, which was discontinued once he was no longer taking steroids.  He also has hypertension hyperlipidemia, a history of smoking and prostate cancer developed in 2019 and is status post RALP in 2020.    .    He last saw Marcy in August 2021, he was taking Metformin 1000 mg twice daily, Januvia 25 mg daily, Lantus 10 units 2x daily. His average blood sugar was 257 without any hypoglycemia and a GMI of 9.5 we will increase his Lantus to 15 units in the morning and 15 in the evening and discussed how he might reduce his carbohydrate intake.    I met Jennie in Sept 2021:  A1C 7.9 on same meds x reduced Lantus to 10 units once daily. Discussed possible GLP-1 agonist or SGLT2-i in place of Januvia and Lantus.  Noted overweight.      The patient's risk factor profile is: (+) HTN, (+) diabetes, (+) hyperlipidemia, (+) prior tobacco use, (-) family Hx CAD.     February 2022: A1c had risen to 10 in January.  We stopped Januvia and we started Trulicity at 0.75 mg weekly.   Advised him to increase this to 1.5 mg and 1 month if tolerated.    October 2022: Most recent A1c was elevated but he reported blood  sugars near goal some months prior, not checking at the time of her visit.  He did have ongoing elevated blood pressure and we did increase his amlodipine.  Advised he update his TSH, referral for eye exam, advised he to follow-up his blood pressure with his primary care provider.  Asked to have to help him reschedule his missed nephrology appointment.      Interim:  He did meet with nephrology for evaluation of his proteinuria and they did discuss doing a renal biopsy once his blood pressure and A1c are at goal.  His losartan was increased to 100 mg daily and they considered resuming Toprol if blood pressure not at goal.  Dr. Briggs did refer him to neurology to evaluate memory change.  A1c in January was 9.0      Today:    He did see a lower BG 82 upon awakening but most BG are higher.  He doesn't give insulin if BG <130, if >200 puts 30 units.  He quit taking Trulicity even though his BG was perfect with this, but his vision became blurry.  Once stopped 3 days later his vision was perfect.        Meds : Amlodipine 5 mg  Losartan 50 mg petersen   Prednisone 5 mg  Levothyroixine 125 mcg  - taking fasting -eats 15 minutes later  Metfromin 500 mg ER 2 tabs bid  Lantus  - gives 0-30 units.      Diabetes complication:  No retinopathy.  Exam updated in October 2022.  Nephropathy currently being evaluated by nephrology.  No known neuropathy.  No known coronary artery disease.        Glucometer data:  Average blood sugars 188 with standard deviation of 35 mg/dL.  Range is 127 - 232.  Checking blood sugar 0-3 times daily on average 0.7 times per day.         Loy's PMH, PSH and allergies were reviewed today and pertinent information is summarized above.  He lives with his wife Drea.  He does have children in the area but they generally they do not help him with getting medications appointments or technology.    Loy's pertinent social and family history are also reviewed today and pertinent information is  summarized above.    Current Outpatient Medications   Medication    aspirin (ASA) 81 MG EC tablet    atorvastatin (LIPITOR) 40 MG tablet    blood glucose (NO BRAND SPECIFIED) lancets standard    blood glucose (NO BRAND SPECIFIED) test strip    blood glucose (ONE TOUCH SURESOFT) lancing device    blood glucose monitoring (ONE TOUCH ULTRA 2) meter device kit    blood glucose monitoring (ONE TOUCH ULTRASOFT) lancets    insulin glargine (LANTUS PEN) 100 UNIT/ML pen    insulin pen needle (31G X 5 MM) 31G X 5 MM miscellaneous    levothyroxine (SYNTHROID/LEVOTHROID) 137 MCG tablet    losartan (COZAAR) 100 MG tablet    metFORMIN (GLUCOPHAGE XR) 500 MG 24 hr tablet    mycophenolate (GENERIC EQUIVALENT) 250 MG capsule    nitroGLYcerin (NITROSTAT) 0.4 MG sublingual tablet    predniSONE (DELTASONE) 5 MG tablet    ursodiol (ACTIGALL) 250 MG tablet    Vitamin D3 (CHOLECALCIFEROL) 25 mcg (1000 units) tablet    amLODIPine (NORVASC) 10 MG tablet    Blood Pressure Monitor KIT    dulaglutide (TRULICITY) 1.5 MG/0.5ML pen    guaiFENesin-dextromethorphan (ROBITUSSIN DM) 100-10 MG/5ML syrup     No current facility-administered medications for this visit.         ROS:   Patient denies any fevers, chills or sweats as well as any changes in vision, problems with floaters or field cuts in vision, pain or problems with dentition, new or different headaches.  Patient denies symptoms of hypo and hyperglycemia except as above.   Patients denies marked fatigue, cough, shortness of breath, chest pain or pressure.  There has been no pain with or other changes in urination or  itching or pain in genital areas.  Patient denies any noted swelling in feet, ankles or otherwise, loss of sensation or pain  in feet or other areas.    Patient also denies current difficulties with depressed mood, anhedonia or worrying too much.        Exam:    BP (!) 157/79 (BP Location: Right arm, Patient Position: Sitting, Cuff Size: Adult Regular)   Pulse 65   Wt 90.7 kg  "(200 lb)   BMI 30.41 kg/m      Wt Readings from Last 10 Encounters:   04/04/23 90.7 kg (200 lb)   03/22/23 90.6 kg (199 lb 11.2 oz)   03/20/23 91 kg (200 lb 9.6 oz)   03/11/23 86.2 kg (190 lb)   01/27/23 89.8 kg (197 lb 14.4 oz)   10/28/22 89.4 kg (197 lb)   10/26/22 88.5 kg (195 lb)   10/21/22 87.2 kg (192 lb 3.2 oz)   10/04/22 90.2 kg (198 lb 12.8 oz)   09/22/22 90.3 kg (199 lb)       General: Pleasant, well nourished and hydrated male in NAD.   Psych:  Mood is \"good,\" affect is appropriate.  Thought form and content are fluid and coherent.    HEENT: Eyes and sclera are clear. Extraocular movements are grossly intact without proptosis.  Nares are patent, mucous membranes moist.  Neck: No masses or JVD are noted.    Resp: Easy and unlabored breathing.   Neuro: Alert and oriented, communicating clearly.   Ext: no swelling or edema.  Good distal pulses and capillary refill in digits.  Skin in good condition.  Sensation is intact to monofilament testing bilaterally and throughout.    Data:      Recent Labs   Lab Test 03/11/23  0434 01/27/23  1341 12/27/22  0917 10/21/22  1019 10/04/22  1101 03/02/22  1442 01/25/22  0858 01/25/22  0851 10/13/21  0905 09/13/21  0848 03/24/21  0812 02/12/21  0000 02/01/21  0723 09/25/20  0826   A1C  --  9.0*  --  11.1*  --    < >  --  10.0*  --  7.9*   < >  --   --   --    HEMOGLOBINA1  --   --   --   --   --   --   --   --   --   --   --  12.1*  --   --    TSH  --  9.80*  --   --  5.43*   < >  --  12.29*  --  4.07*   < >  --   --   --    T4  --  1.03  --   --  1.54   < >  --  1.00  --  1.00   < >  --   --   --    LDL  --   --   --   --   --   --   --  74  --  311*   < >  --   --   --    HDL  --   --   --   --   --   --   --  57  --  71   < >  --   --   --    TRIG  --   --   --   --   --   --   --  136  --  90   < >  --   --   --    CR 0.86 0.92   < > 0.83  --    < >  --  0.80   < > 0.89   < >  --    < >  --    MICROL  --   --   --   --   --   --  5,750  --   --   --   --   --   --  764 "    < > = values in this interval not displayed.     Hemoglobin   Date Value Ref Range Status   03/11/2023 14.1 13.3 - 17.7 g/dL Final   07/07/2021 14.0 13.3 - 17.7 g/dL Final   ]    Most recent GFRs: Greater than 90 in July 2022.          Assessment/Plan:    Loy Limon is a 67 year old male with type 2 diabetes that was at goal when his A1c was last checked in July.    1 Diabetes -recent A1c 9.0 and blood sugars today above goal.  Advised to consistently take Lantus 20 units daily - increase by 1 unit each week until fasting  - 150.  Prolonged time spent outlining this plan and Bubba Limon is able to repeat this back to me.  Even though it has blood sugars were nearly perfect on Trulicity he discontinued it as he had increasingly blurred vision.  Chandra the option of trying a different GLP-1 agonist versus returning to Januvia and he elected to resume Januvia.    2. Hypertension: Blood pressures are above goal today on losartan alone .  Advised to resume Amlodipine and recheck blood pressure.        2. DM Complications-     Lifestyle modification focusing on application of a Mediterranean diet or Dietary Approaches to Stop Hypertension (DASH) dietary pattern; reduction of saturated fat and trans fat; increase of dietary n-3 fatty acids, viscous fiber, and plant stanols/sterols intake; and increased physical activity recommended to improve the lipid profile and reduce the risk of developing atherosclerotic cardiovascular disease.     Retinopathy:   No known retinopathy; exam up-to-date done in October 2022.  Nephropathy:   Has a history of proteinuria and elevated blood pressure, urged her to reschedule with nephrology at his last visit was canceled due to weather asking staff to assist him with this.  Placed another referral in hopes that this would help this be scheduled.  Neuropathy: No.    Feet: OK, no ulcers or lesions.   Lipids:10-year atherosclerotic cardiovascular disease risk >20%.  Patient taking a  statin.      50 minutes spent on the date of the encounter doing chart review, history and exam, documentation, education and counseling, as well as communication and coordination of care, and further activities as noted above.  This time excludes time spent reviewing CGM.      It is my privilege to be involved in the care of the above patient.     Leia Edward PA-C, MPAS  [facility]  Diabetes, Endocrinology, and Metabolism  [phone] Appointments/Nurse  [phone] pager  [phone] nurse line    Chart accessed 13 minutes    Exam room 29 minutes    Current session 8 minutes    Total time: 50 minutes*       This note was completed in part using Dragon voice recognition, and may contain word and grammatical errors.        Patient is showing 3/5 MNCM met. A1c not in range   Olamide Renee, VF

## 2023-04-04 NOTE — NURSING NOTE
Chief Complaint   Patient presents with     Diabetes     Blood pressure (!) 157/79, pulse 65, weight 90.7 kg (200 lb).    Olamide Renee

## 2023-04-07 ENCOUNTER — TELEPHONE (OUTPATIENT)
Dept: NEPHROLOGY | Facility: CLINIC | Age: 68
End: 2023-04-07
Payer: COMMERCIAL

## 2023-04-07 NOTE — TELEPHONE ENCOUNTER
Left Voicemail (1st Attempt) for the patient to call back and schedule the following:    Appointment type: Follow up   Provider: adelia Garnett  Return date: Next available  Specialty phone number: [phone]  Additional appointment(s) needed: lab  Additonal Notes: n/a

## 2023-04-25 DIAGNOSIS — C22.0 HCC (HEPATOCELLULAR CARCINOMA) (H): Primary | ICD-10-CM

## 2023-04-26 ENCOUNTER — ANCILLARY PROCEDURE (OUTPATIENT)
Dept: MRI IMAGING | Facility: CLINIC | Age: 68
End: 2023-04-26
Attending: INTERNAL MEDICINE
Payer: COMMERCIAL

## 2023-04-26 DIAGNOSIS — C22.0 HCC (HEPATOCELLULAR CARCINOMA) (H): ICD-10-CM

## 2023-04-26 PROCEDURE — A9585 GADOBUTROL INJECTION: HCPCS | Mod: JW | Performed by: RADIOLOGY

## 2023-04-26 PROCEDURE — 74183 MRI ABD W/O CNTR FLWD CNTR: CPT | Mod: TC | Performed by: RADIOLOGY

## 2023-04-26 RX ORDER — GADOBUTROL 604.72 MG/ML
9 INJECTION INTRAVENOUS ONCE
Status: COMPLETED | OUTPATIENT
Start: 2023-04-26 | End: 2023-04-26

## 2023-04-26 RX ADMIN — GADOBUTROL 9 ML: 604.72 INJECTION INTRAVENOUS at 19:42

## 2023-04-27 ENCOUNTER — ONCOLOGY VISIT (OUTPATIENT)
Dept: ONCOLOGY | Facility: CLINIC | Age: 68
End: 2023-04-27
Attending: INTERNAL MEDICINE
Payer: COMMERCIAL

## 2023-04-27 VITALS
OXYGEN SATURATION: 98 % | WEIGHT: 201.4 LBS | SYSTOLIC BLOOD PRESSURE: 181 MMHG | RESPIRATION RATE: 12 BRPM | HEART RATE: 72 BPM | BODY MASS INDEX: 30.62 KG/M2 | DIASTOLIC BLOOD PRESSURE: 82 MMHG | TEMPERATURE: 98.4 F

## 2023-04-27 DIAGNOSIS — C22.0 HCC (HEPATOCELLULAR CARCINOMA) (H): Primary | ICD-10-CM

## 2023-04-27 DIAGNOSIS — I10 ESSENTIAL HYPERTENSION: ICD-10-CM

## 2023-04-27 PROCEDURE — G0463 HOSPITAL OUTPT CLINIC VISIT: HCPCS | Performed by: INTERNAL MEDICINE

## 2023-04-27 PROCEDURE — 99215 OFFICE O/P EST HI 40 MIN: CPT | Performed by: INTERNAL MEDICINE

## 2023-04-27 ASSESSMENT — PAIN SCALES - GENERAL: PAINLEVEL: NO PAIN (0)

## 2023-04-27 NOTE — PROGRESS NOTES
Oncology follow up visit:  Date on this visit: 4/27/23     HCC    Primary Physician: [facility], Oncology     History Of Present Illness:      Please see previous note for details. I have copied and updated from prior note.      Mr. Limon is a 67 year old male who has been a chronic heavy alcohol drinker presented with right abdominal pain in March 2018 and workup including a CT scan showed cirrhosis, portal hypertension and 2 hepatic lesions in the right hepatic lobe which were concerning for hepatocellular carcinoma.  He had MRI on 3/16/2018 which confirmed cirrhosis and portal hypertension and splenomegaly. 3.7 cm segment 8 lesion was OPTN 5B.  There was an antecedent 0.9 cm satellite nodule in hepatic segment 8 consistent with LI-RADS 4.  In segment 7, 1.5 cm LIRADS 4 lesion was seen  Another 1.9 cm segment 7 lesion was seen which was indeterminate.  Several other LIRADS 3 lesions were scattered.  Alpha-fetoprotein was not elevated.  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 Hepatitis B surface antibody was reactive consistent with effective vaccination. Hepatitis C antibody was nonreactive.  He has had no ascites. He has had mild hepatic and Neuropathy but was unable to tolerate lactulose because of abdominal discomfort. He has no history of variceal bleeding although he has grade 2 esophageal varices which have not been banded and he continues to be on propranolol.    He had repeat MRI done on 5/24/2018 which showed stable to slight increase in size of the segment 8 lesion.  Segment 7 lesion was consistent with LIRADS 4 lesion  Another segment 7 lesion is also consistent with LIRADS 4 lesion  CT of the chest showed a 4 mm solitary pulmonary nodule in the right lower lobe which remains indeterminate. There was no other evidence of metastasis.  Bone scan was negative for any evidence of metastatic disease.    He had chemoembolization of the S8  lesion on 18.    As there was residual tumor left, he had microwave ablation of residual mass in hepatic segment 8 on 2018.     S7 IRADS 4 lesions were not treated    Repeat MRI on 10/3/18 shows no residual viable tumor in S8 lesion  S7 LIRADS 4 lesion was less well defined.  He has some scattered LIRADS 3 lesions.    He had liver transplant in Dec 2018 and explanted liver showed S8 viable tumor with 90% necrosis.  S7 1.1 cm HCC and S5 1.4 cm HCC.  3 benign lymph nodes.  It was a ypT2N0 lesion.    He developed prostate cancer (19 - PSA 5.51ng/dL) and is now s/p RALP with Dr. Casiano on 1/3/20, final path: [place] 3+4=7, neg margins, [id number].   Last PSA on 7/3/2020- <0.01.      Interval hx:   A professional Yoruba Interpretor is available throughout the visit through iPad    He was treated for cystitis in 2023 with cephalexin.  Urine cultures grew E. Coli.    Now doing well. Has good energy. No GI issues. No more problem urinating.  No pain. No SOB.      ECOG 1    ROS:  Rest of the comprehensive review of the system was essentially unremarkable.        I reviewed other history in epic as below.    Past Medical/Surgical History:  Past Medical History:   Diagnosis Date     Anemia      Biliary stricture of transplanted liver (H) 2018     BPH (benign prostatic hyperplasia)      Cancer (H)     hepatocellualr carcinoma     Cirrhosis of liver (H) 2018     Diabetes (H)      Enlarged prostate      Hypothyroidism      Impotence of organic origin 2020     Inguinal hernia     Repaired with mesh on 18     Liver lesion 2018     Liver transplanted (H) 2018     donor liver transplant     Portal vein thrombosis     on path explant     Postoperative atrial fibrillation (H) 2018     Prostate cancer (H)      TB lung, latent     Treated      Past Surgical History:   Procedure Laterality Date     APPENDECTOMY       COLONOSCOPY           CV CORONARY ANGIOGRAM N/A  "10/13/2021    Procedure: CV CORONARY ANGIOGRAM;  Surgeon: Yaya Hampton MD;  [facility]     CV CORONARY LITHOTRIPSY PCI N/A 10/13/2021    Procedure: CV Coronary Lithotripsy PCI;  Surgeon: Yaya Hampton MD;  [facility]     CV INSTANTANEOUS WAVE-FREE RATIO N/A 10/13/2021    Procedure: Instantaneous Wave-Free Ratio;  Surgeon: Yaya Hampton MD;  Location:  [facility]     CV INTRAVASULAR ULTRASOUND N/A 10/13/2021    Procedure: Intravascular Ultrasound;  Surgeon: Yaya Hampton MD;  Location:  [facility]     CV PCI STENT DRUG ELUTING N/A 10/13/2021    Procedure: Percutaneous Coronary Intervention Stent Drug Eluting;  Surgeon: Yaya Hampton MD;  Location:  [facility]     DAVINCI PROSTATECTOMY N/A 1/3/2020    Procedure: Robot-assisted laparoscopic radical prostatectomy, Bladder biopsy;  Surgeon: Kenrick Casiano MD;  [facility]: [facility]     ENDOSCOPIC RETROGRADE CHOLANGIOPANCREATOGRAM N/A 12/28/2018    Procedure: ENDOSCOPIC RETROGRADE CHOLANGIOPANCREATOGRAM, with Billary Sphincterotomy and Billary Stent Placement;  Surgeon: Omero Lawler MD;  Location: [facility]     ENDOSCOPIC RETROGRADE CHOLANGIOPANCREATOGRAM  2/18/2019    Procedure: COMBINED ENDOSCOPIC RETROGRADE CHOLANGIOPANCREATOGRAPHY, Bile Duct Stent Exchange;  Surgeon: Omero Lawler MD;  [facility]: [facility]     ENDOSCOPIC RETROGRADE CHOLANGIOPANCREATOGRAM N/A 4/29/2019    Procedure: ENDOSCOPIC RETROGRADE CHOLANGIOPANCREATOGRAPHY, WITH BILIARY STENT REMOVAL AND STONE EXTRACTION;  Surgeon: Omero Lawler MD;  [facility]     HERNIORRHAPHY INGUINAL  12/22/2018    Procedure: Herniorrhaphy inguinal;  Surgeon: Emil Echevarria MD;  [facility]     IR LIVER BIOPSY PERCUTANEOUS  12/31/2018     LAPAROTOMY EXPLORATORY      per the patient \"for infection in the LLQ\"  "     TRANSPLANT LIVER RECIPIENT  DONOR N/A 2018    Procedure: TRANSPLANT LIVER RECIPIENT  DONOR;  Surgeon: Emil Echevarria MD;  Location: [facility]     Cancer History:   As above    Allergies:  Allergies as of 2023     (No Known Allergies)     Current Medications:  Current Outpatient Medications   Medication Sig Dispense Refill     amLODIPine (NORVASC) 10 MG tablet Take 1 tablet (10 mg) by mouth daily 90 tablet 1     atorvastatin (LIPITOR) 40 MG tablet Take 1 tablet (40 mg) by mouth daily 90 tablet 3     insulin glargine (LANTUS PEN) 100 UNIT/ML pen Inject 20-30 Units Subcutaneous At Bedtime Please increase by 1 unit each week until fasting BG is 100 - 150 most days. 30 mL 3     levothyroxine (SYNTHROID/LEVOTHROID) 137 MCG tablet Take 1 tablet (137 mcg) by mouth daily 90 tablet 1     losartan (COZAAR) 100 MG tablet Take 1 tablet (100 mg) by mouth daily 90 tablet 3     metFORMIN (GLUCOPHAGE XR) 500 MG 24 hr tablet Take 2 tablets (1,000 mg) by mouth 2 times daily (with meals) 360 tablet 3     predniSONE (DELTASONE) 5 MG tablet Take 1 tablet (5 mg) by mouth daily 90 tablet 3     ursodiol (ACTIGALL) 250 MG tablet Take 1 tablet (250 mg) by mouth 2 times daily 180 tablet 3     Vitamin D3 (CHOLECALCIFEROL) 25 mcg (1000 units) tablet Take 2 tablets (50 mcg) by mouth daily 180 tablet 3     blood glucose (NO BRAND SPECIFIED) lancets standard Use to test blood sugar 2 times daily or as directed. (Patient not taking: Reported on 2023) 50 lancet 3     blood glucose (NO BRAND SPECIFIED) test strip Use to test blood sugar 2 times daily or as directed. One touch ultra test strips (Patient not taking: Reported on 2023) 50 strip 3     blood glucose (ONE TOUCH SURESOFT) lancing device Lancing device to be used with lancets. (Patient not taking: Reported on 2023) 1 each 0     blood glucose monitoring (ONE TOUCH ULTRA 2) meter device kit Use to test blood sugar 2 times daily or as directed.  (Patient not taking: Reported on 2023) 1 kit 0     blood glucose monitoring (ONE TOUCH ULTRASOFT) lancets Use to test blood sugar 2 times daily. (Patient not taking: Reported on 2023) 100 each 6     Blood Pressure Monitor KIT Automatic Blood Pressure Monitor (Patient not taking: Reported on 10/28/2022) 1 kit 0     dulaglutide (TRULICITY) 1.5 MG/0.5ML pen Inject 1.5 mg Subcutaneous every 7 days (Patient not taking: Reported on 10/28/2022) 6 mL 1     guaiFENesin-dextromethorphan (ROBITUSSIN DM) 100-10 MG/5ML syrup Take 10 mLs by mouth every 4 hours as needed for cough (Patient not taking: Reported on 10/28/2022) 30 mL 0     insulin pen needle (31G X 5 MM) 31G X 5 MM miscellaneous Use one  pen needles daily or as directed. 100 each 3     mycophenolate (GENERIC EQUIVALENT) 250 MG capsule TAKE 3 CAPSULES(750 MG) BY MOUTH TWICE DAILY (Patient not taking: Reported on 2023) 540 capsule 3     nitroGLYcerin (NITROSTAT) 0.4 MG sublingual tablet For chest pain place 1 tablet under the tongue every 5 minutes for 3 doses. If symptoms persist 5 minutes after 1st dose call 911. (Patient not taking: Reported on 2023) 30 tablet 1     sitagliptin (JANUVIA) 50 MG tablet Take 1 tablet (50 mg) by mouth daily (Patient not taking: Reported on 2023) 90 tablet 1      Family History:  Family History   Problem Relation Age of Onset     Memory loss Mother      Liver Disease Brother      Skin Cancer No family hx of      Melanoma No family hx of       Maternal grand mother had Uterus cancer at 63  He has 3 living children. One son with schizophrenia  One daughter dies of leukemia at 8 years of age  Son  shortly after birth. He had some brain congenital anomaly    Social History:  Social History     Socioeconomic History     Marital status:      Spouse name: Not on file     Number of children: Not on file     Years of education: Not on file     Highest education level: Not on file   Occupational History     Not  on file   Tobacco Use     Smoking status: Former     Packs/day: 0.03     Years: 20.00     Pack years: 0.60     Types: Cigarettes, Cigars     Start date: 1970     Quit date: 3/14/2018     Years since quittin.1     Smokeless tobacco: Never     Tobacco comments:     Quit smoking 2018, one cigarette after dinner   Vaping Use     Vaping status: Not on file   Substance and Sexual Activity     Alcohol use: No     Comment: Quit drinking 2018     Drug use: No     Sexual activity: Not on file   Other Topics Concern     Parent/sibling w/ CABG, MI or angioplasty before 65F 55M? Not Asked   Social History Narrative    Born in [place], came to [place] years ago.     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cardboard, trimming meats     Social Determinants of Health     Financial Resource Strain: Not on file   Food Insecurity: Not on file   Transportation Needs: Not on file   Physical Activity: Not on file   Stress: Not on file   Social Connections: Not on file   Intimate Partner Violence: Not At Risk (2023)    Humiliation, Afraid, Rape, and Kick questionnaire      Fear of Current or Ex-Partner: No      Emotionally Abused: No      Physically Abused: No      Sexually Abused: No   Housing Stability: Not on file   smoker for 25 years. One pack for 1 week. Quit in 2018.  Has been a heavy drinker for 30 years, beer about 36 every week. Last drink was around 2018.  No drug use.  Lives with wife.     Physical Exam:  BP (!) 181/82   Pulse 72   Temp 98.4  F (36.9  C) (Oral)   Resp 12   Wt 91.4 kg (201 lb 6.4 oz)   SpO2 98%   BMI 30.62 kg/m     Wt Readings from Last 4 Encounters:   23 91.4 kg (201 lb 6.4 oz)   23 90.7 kg (200 lb)   23 90.6 kg (199 lb 11.2 oz)   23 91 kg (200 lb 9.6 oz)     CONSTITUTIONAL: no acute distress  EYES: PERRLA, no palor or icterus.   ENT/MOUTH: no mouth lesions. Ears normal  CVS: s1s2 no m r g .   RESPIRATORY: clear to auscultation b/l  GI: Well-healed surgical scars.   Soft non tender no hepatosplenomegaly  NEURO: AAOX3  Grossly non focal neuro exam  INTEGUMENT: no obvious rashes  LYMPHATIC: no palpable cervical, supraclavicular, axillary or inguinal LAD  MUSCULOSKELETAL: Unremarkable. No bony tenderness.   EXTREMITIES: no edema  PSYCH: Mentation, mood and affect are normal. Decision making capacity is intact      Laboratory/Imaging Studies    Reviewed  3/11/2023  CBC is unremarkable.    1/27/2023.  CMP unremarkable except alkaline phosphatase 166.     12/27/2022.  Alpha-fetoprotein was 1.8.    MRI Abdomen on 4/26/2022 showed status post hepatic transplant without any evidence of recurrent malignancy.  Stable mildly enlarged lymph node versus cystic structure which is partially included for imaging and the retroperitoneum adjacent to the inferior vena cava.      CT abdomen and pelvis with contrast on 3/11/2023 showed mural thickening of bladder which is at least in part exaggerated due to under distention of the bladder with adjacent pericystic stranding concerning for inflammatory process of the bladder such as cystitis.    Urinalysis on 3/11/2023 showed large amount of blood.  Small leukocyte esterase, high WBC and RBCs.  Nitrites negative.  Urine culture was positive for E. coli.      ASSESSMENT/PLAN:    HCC  S8 3.7 cm OPTN 5B lesion  S7 LIRADS 4 lesions x 2    No evidence of metastatic disease    He had chemoembolization of the S8 lesion on 6/13/18.    As there was residual tumor left, he had microwave ablation of residual mass in hepatic segment 8 on 7/2/2018.     S7 IRADS 4 lesions were not treated    Repeat MRI on 10/3/18 shows no residual viable tumor in S8 lesion  S7 LIRADS 4 lesion was less well defined.  He has some scattered LIRADS 3 lesions.      He had liver transplant in Dec 2018 and explanted liver showed S8 viable tumor with 90% necrosis.  S7 1.1 cm HCC and S5 1.4 cm HCC.  3 benign lymph nodes.  It was a ypT2N0 lesion.    Currently feels good.  No evidence of  recurrence.  I wanted to repeat his scans around the end of the year but he mentioned that he will be going to [place] in October and then he will be back around end of January 2024.  Therefore we will repeat labs and MRI when he comes back.     Elevated blood pressure.  His blood pressure in the clinic was elevated.  He is asymptomatic.  I recommend that he checks his blood pressures at home regularly when he is relaxed and keep a log of it.  If it is persistently high then he should talk to his primary care physician for better blood pressure management.  I am not making changes to his antihypertensive regimen today.  I gave him a prescription for blood pressure monitor as well.      Prostate Cancer. He developed prostate cancer (4/26/19 - PSA 5.51ng/dL) and is now s/p RALP with Dr. Casiano on 1/3/20, final path: [place] 3+4=7, neg margins, [id number].   Last PSA was <0.01 on 10/21/2022.  Continue to follow with urology.          We did not address the following today.  SOB. Has ENGLISH and has mild ankle swelling. No orthopnea or PND.  Cardiac catheterization October 2021 showed 60% mid LAD stenosis and he had drug-eluting stent placed.  He has been a chronic smoker and has HTN HL and DM-he is following with cardiology.          Pulmonary nodule-indeterminate 4 mm nodule in  right lower lobe has now resolved. Will not repeat routine imaging for chest for now       RTC in February 2024 with lab/MRI prior    All questions answered and he agrees with the plan    Hi eMlgar

## 2023-04-27 NOTE — NURSING NOTE
"Oncology Rooming Note    April 27, 2023 3:04 PM   Loy Limon is a 67 year old male who presents for:    Chief Complaint   Patient presents with     Oncology Clinic Visit     Prostate ca     Initial Vitals: BP (!) 181/82   Pulse 72   Temp 98.4  F (36.9  C) (Oral)   Resp 12   Wt 91.4 kg (201 lb 6.4 oz)   SpO2 98%   BMI 30.62 kg/m   Estimated body mass index is 30.62 kg/m  as calculated from the following:    Height as of 3/20/23: 1.727 m (5' 8\").    Weight as of this encounter: 91.4 kg (201 lb 6.4 oz). Body surface area is 2.09 meters squared.  No Pain (0) Comment: Data Unavailable   No LMP for male patient.  Allergies reviewed: Yes  Medications reviewed: Yes    Medications: Medication refills not needed today.  Pharmacy name entered into EPIC:    [facility] - [place], [place] - [address]  [facility] - [place], [place] - [address] AT [address]    Clinical concerns: None      Alessandra Boggs            "

## 2023-04-27 NOTE — LETTER
4/27/2023         RE: Loy Limon  [address]  [facility] [address]        Dear Colleague,    Thank you for referring your patient, Loy Limon, to the [facility]. Please see a copy of my visit note below.    Oncology follow up visit:  Date on this visit: 4/27/23     HCC    Primary Physician: [facility], Oncology     History Of Present Illness:      Please see previous note for details. I have copied and updated from prior note.      Mr. Limon is a 67 year old male who has been a chronic heavy alcohol drinker presented with right abdominal pain in March 2018 and workup including a CT scan showed cirrhosis, portal hypertension and 2 hepatic lesions in the right hepatic lobe which were concerning for hepatocellular carcinoma.  He had MRI on 3/16/2018 which confirmed cirrhosis and portal hypertension and splenomegaly. 3.7 cm segment 8 lesion was OPTN 5B.  There was an antecedent 0.9 cm satellite nodule in hepatic segment 8 consistent with LI-RADS 4.  In segment 7, 1.5 cm LIRADS 4 lesion was seen  Another 1.9 cm segment 7 lesion was seen which was indeterminate.  Several other LIRADS 3 lesions were scattered.  Alpha-fetoprotein was not elevated.  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 Hepatitis B surface antibody was reactive consistent with effective vaccination. Hepatitis C antibody was nonreactive.  He has had no ascites. He has had mild hepatic and Neuropathy but was unable to tolerate lactulose because of abdominal discomfort. He has no history of variceal bleeding although he has grade 2 esophageal varices which have not been banded and he continues to be on propranolol.    He had repeat MRI done on 5/24/2018 which showed stable to slight increase in size of the segment 8 lesion.  Segment 7 lesion was consistent with LIRADS 4 lesion  Another segment 7 lesion is also consistent with  LIRADS 4 lesion  CT of the chest showed a 4 mm solitary pulmonary nodule in the right lower lobe which remains indeterminate. There was no other evidence of metastasis.  Bone scan was negative for any evidence of metastatic disease.    He had chemoembolization of the S8 lesion on 18.    As there was residual tumor left, he had microwave ablation of residual mass in hepatic segment 8 on 2018.     S7 IRADS 4 lesions were not treated    Repeat MRI on 10/3/18 shows no residual viable tumor in S8 lesion  S7 LIRADS 4 lesion was less well defined.  He has some scattered LIRADS 3 lesions.    He had liver transplant in Dec 2018 and explanted liver showed S8 viable tumor with 90% necrosis.  S7 1.1 cm HCC and S5 1.4 cm HCC.  3 benign lymph nodes.  It was a ypT2N0 lesion.    He developed prostate cancer (19 - PSA 5.51ng/dL) and is now s/p RALP with Dr. Casiano on 1/3/20, final path: [place] 3+4=7, neg margins, [id number].   Last PSA on 7/3/2020- <0.01.      Interval hx:   A professional Slovenian Interpretor is available throughout the visit through iPad    He was treated for cystitis in 2023 with cephalexin.  Urine cultures grew E. Coli.    Now doing well. Has good energy. No GI issues. No more problem urinating.  No pain. No SOB.      ECOG 1    ROS:  Rest of the comprehensive review of the system was essentially unremarkable.        I reviewed other history in epic as below.    Past Medical/Surgical History:  Past Medical History:   Diagnosis Date    Anemia     Biliary stricture of transplanted liver (H) 2018    BPH (benign prostatic hyperplasia)     Cancer (H)     hepatocellualr carcinoma    Cirrhosis of liver (H) 2018    Diabetes (H)     Enlarged prostate     Hypothyroidism     Impotence of organic origin 2020    Inguinal hernia     Repaired with mesh on 18    Liver lesion 2018    Liver transplanted (H) 2018     donor liver transplant    Portal vein thrombosis      on path explant    Postoperative atrial fibrillation (H) 12/25/2018    Prostate cancer (H)     TB lung, latent     Treated      Past Surgical History:   Procedure Laterality Date    APPENDECTOMY      COLONOSCOPY      2018    CV CORONARY ANGIOGRAM N/A 10/13/2021    Procedure: CV CORONARY ANGIOGRAM;  Surgeon: Yaya Hampton MD;  Location:  HEART CARDIAC CATH LAB    CV CORONARY LITHOTRIPSY PCI N/A 10/13/2021    Procedure: CV Coronary Lithotripsy PCI;  Surgeon: Yaya Hampton MD;  Location:  HEART CARDIAC CATH LAB    CV INSTANTANEOUS WAVE-FREE RATIO N/A 10/13/2021    Procedure: Instantaneous Wave-Free Ratio;  Surgeon: Yaya Hampton MD;  Location:  HEART CARDIAC CATH LAB    CV INTRAVASULAR ULTRASOUND N/A 10/13/2021    Procedure: Intravascular Ultrasound;  Surgeon: Yaya Hampton MD;  Location:  HEART CARDIAC CATH LAB    CV PCI STENT DRUG ELUTING N/A 10/13/2021    Procedure: Percutaneous Coronary Intervention Stent Drug Eluting;  Surgeon: Yaya Hampton MD;  Location:  HEART CARDIAC CATH LAB    DAVINCI PROSTATECTOMY N/A 1/3/2020    Procedure: Robot-assisted laparoscopic radical prostatectomy, Bladder biopsy;  Surgeon: Kenrick Casiano MD;  Location:  OR    ENDOSCOPIC RETROGRADE CHOLANGIOPANCREATOGRAM N/A 12/28/2018    Procedure: ENDOSCOPIC RETROGRADE CHOLANGIOPANCREATOGRAM, with Billary Sphincterotomy and Billary Stent Placement;  Surgeon: Omero Lawler MD;  Location:  OR    ENDOSCOPIC RETROGRADE CHOLANGIOPANCREATOGRAM  2/18/2019    Procedure: COMBINED ENDOSCOPIC RETROGRADE CHOLANGIOPANCREATOGRAPHY, Bile Duct Stent Exchange;  Surgeon: Omero Lawler MD;  [facility]    ENDOSCOPIC RETROGRADE CHOLANGIOPANCREATOGRAM N/A 4/29/2019    Procedure: ENDOSCOPIC RETROGRADE CHOLANGIOPANCREATOGRAPHY, WITH BILIARY STENT REMOVAL AND STONE EXTRACTION;  Surgeon: Omero Lawler MD;  Location:  OR     "HERNIORRHAPHY INGUINAL  2018    Procedure: Herniorrhaphy inguinal;  Surgeon: Emil Echevarria MD;  [facility]    IR LIVER BIOPSY PERCUTANEOUS  2018    LAPAROTOMY EXPLORATORY      per the patient \"for infection in the LLQ\"     TRANSPLANT LIVER RECIPIENT  DONOR N/A 2018    Procedure: TRANSPLANT LIVER RECIPIENT  DONOR;  Surgeon: Emil Echevarria MD;  [facility]     Cancer History:   As above    Allergies:  Allergies as of 2023    (No Known Allergies)     Current Medications:  Current Outpatient Medications   Medication Sig Dispense Refill    amLODIPine (NORVASC) 10 MG tablet Take 1 tablet (10 mg) by mouth daily 90 tablet 1    atorvastatin (LIPITOR) 40 MG tablet Take 1 tablet (40 mg) by mouth daily 90 tablet 3    insulin glargine (LANTUS PEN) 100 UNIT/ML pen Inject 20-30 Units Subcutaneous At Bedtime Please increase by 1 unit each week until fasting BG is 100 - 150 most days. 30 mL 3    levothyroxine (SYNTHROID/LEVOTHROID) 137 MCG tablet Take 1 tablet (137 mcg) by mouth daily 90 tablet 1    losartan (COZAAR) 100 MG tablet Take 1 tablet (100 mg) by mouth daily 90 tablet 3    metFORMIN (GLUCOPHAGE XR) 500 MG 24 hr tablet Take 2 tablets (1,000 mg) by mouth 2 times daily (with meals) 360 tablet 3    predniSONE (DELTASONE) 5 MG tablet Take 1 tablet (5 mg) by mouth daily 90 tablet 3    ursodiol (ACTIGALL) 250 MG tablet Take 1 tablet (250 mg) by mouth 2 times daily 180 tablet 3    Vitamin D3 (CHOLECALCIFEROL) 25 mcg (1000 units) tablet Take 2 tablets (50 mcg) by mouth daily 180 tablet 3    blood glucose (NO BRAND SPECIFIED) lancets standard Use to test blood sugar 2 times daily or as directed. (Patient not taking: Reported on 2023) 50 lancet 3    blood glucose (NO BRAND SPECIFIED) test strip Use to test blood sugar 2 times daily or as directed. One touch ultra test strips (Patient not taking: Reported on 2023) 50 strip 3    blood glucose (ONE TOUCH SURESOFT) " lancing device Lancing device to be used with lancets. (Patient not taking: Reported on 2023) 1 each 0    blood glucose monitoring (ONE TOUCH ULTRA 2) meter device kit Use to test blood sugar 2 times daily or as directed. (Patient not taking: Reported on 2023) 1 kit 0    blood glucose monitoring (ONE TOUCH ULTRASOFT) lancets Use to test blood sugar 2 times daily. (Patient not taking: Reported on 2023) 100 each 6    Blood Pressure Monitor KIT Automatic Blood Pressure Monitor (Patient not taking: Reported on 10/28/2022) 1 kit 0    dulaglutide (TRULICITY) 1.5 MG/0.5ML pen Inject 1.5 mg Subcutaneous every 7 days (Patient not taking: Reported on 10/28/2022) 6 mL 1    guaiFENesin-dextromethorphan (ROBITUSSIN DM) 100-10 MG/5ML syrup Take 10 mLs by mouth every 4 hours as needed for cough (Patient not taking: Reported on 10/28/2022) 30 mL 0    insulin pen needle (31G X 5 MM) 31G X 5 MM miscellaneous Use one  pen needles daily or as directed. 100 each 3    mycophenolate (GENERIC EQUIVALENT) 250 MG capsule TAKE 3 CAPSULES(750 MG) BY MOUTH TWICE DAILY (Patient not taking: Reported on 2023) 540 capsule 3    nitroGLYcerin (NITROSTAT) 0.4 MG sublingual tablet For chest pain place 1 tablet under the tongue every 5 minutes for 3 doses. If symptoms persist 5 minutes after 1st dose call 911. (Patient not taking: Reported on 2023) 30 tablet 1    sitagliptin (JANUVIA) 50 MG tablet Take 1 tablet (50 mg) by mouth daily (Patient not taking: Reported on 2023) 90 tablet 1      Family History:  Family History   Problem Relation Age of Onset    Memory loss Mother     Liver Disease Brother     Skin Cancer No family hx of     Melanoma No family hx of       Maternal grand mother had Uterus cancer at 63  He has 3 living children. One son with schizophrenia  One daughter dies of leukemia at 8 years of age  Son  shortly after birth. He had some brain congenital anomaly    Social History:  Social History      Socioeconomic History    Marital status:      Spouse name: Not on file    Number of children: Not on file    Years of education: Not on file    Highest education level: Not on file   Occupational History    Not on file   Tobacco Use    Smoking status: Former     Packs/day: 0.03     Years: 20.00     Pack years: 0.60     Types: Cigarettes, Cigars     Start date: 1970     Quit date: 3/14/2018     Years since quittin.1    Smokeless tobacco: Never    Tobacco comments:     Quit smoking 2018, one cigarette after dinner   Vaping Use    Vaping status: Not on file   Substance and Sexual Activity    Alcohol use: No     Comment: Quit drinking 2018    Drug use: No    Sexual activity: Not on file   Other Topics Concern    Parent/sibling w/ CABG, MI or angioplasty before 65F 55M? Not Asked   Social History Narrative    Born in [place], came to [place] 30 years ago.     Has worked in [employer], trimming meats     Social Determinants of Health     Financial Resource Strain: Not on file   Food Insecurity: Not on file   Transportation Needs: Not on file   Physical Activity: Not on file   Stress: Not on file   Social Connections: Not on file   Intimate Partner Violence: Not At Risk (2023)    Humiliation, Afraid, Rape, and Kick questionnaire     Fear of Current or Ex-Partner: No     Emotionally Abused: No     Physically Abused: No     Sexually Abused: No   Housing Stability: Not on file   smoker for 25 years. One pack for 1 week. Quit in 2018.  Has been a heavy drinker for 30 years, beer about 36 every week. Last drink was around 2018.  No drug use.  Lives with wife.     Physical Exam:  BP (!) 181/82   Pulse 72   Temp 98.4  F (36.9  C) (Oral)   Resp 12   Wt 91.4 kg (201 lb 6.4 oz)   SpO2 98%   BMI 30.62 kg/m     Wt Readings from Last 4 Encounters:   23 91.4 kg (201 lb 6.4 oz)   23 90.7 kg (200 lb)   23 90.6 kg (199 lb 11.2 oz)   23 91 kg (200 lb 9.6 oz)      CONSTITUTIONAL: no acute distress  EYES: PERRLA, no palor or icterus.   ENT/MOUTH: no mouth lesions. Ears normal  CVS: s1s2 no m r g .   RESPIRATORY: clear to auscultation b/l  GI: Well-healed surgical scars.  Soft non tender no hepatosplenomegaly  NEURO: AAOX3  Grossly non focal neuro exam  INTEGUMENT: no obvious rashes  LYMPHATIC: no palpable cervical, supraclavicular, axillary or inguinal LAD  MUSCULOSKELETAL: Unremarkable. No bony tenderness.   EXTREMITIES: no edema  PSYCH: Mentation, mood and affect are normal. Decision making capacity is intact      Laboratory/Imaging Studies    Reviewed  3/11/2023  CBC is unremarkable.    1/27/2023.  CMP unremarkable except alkaline phosphatase 166.     12/27/2022.  Alpha-fetoprotein was 1.8.    MRI Abdomen on 4/26/2022 showed status post hepatic transplant without any evidence of recurrent malignancy.  Stable mildly enlarged lymph node versus cystic structure which is partially included for imaging and the retroperitoneum adjacent to the inferior vena cava.      CT abdomen and pelvis with contrast on 3/11/2023 showed mural thickening of bladder which is at least in part exaggerated due to under distention of the bladder with adjacent pericystic stranding concerning for inflammatory process of the bladder such as cystitis.    Urinalysis on 3/11/2023 showed large amount of blood.  Small leukocyte esterase, high WBC and RBCs.  Nitrites negative.  Urine culture was positive for E. coli.      ASSESSMENT/PLAN:    HCC  S8 3.7 cm OPTN 5B lesion  S7 LIRADS 4 lesions x 2    No evidence of metastatic disease    He had chemoembolization of the S8 lesion on 6/13/18.    As there was residual tumor left, he had microwave ablation of residual mass in hepatic segment 8 on 7/2/2018.     S7 IRADS 4 lesions were not treated    Repeat MRI on 10/3/18 shows no residual viable tumor in S8 lesion  S7 LIRADS 4 lesion was less well defined.  He has some scattered LIRADS 3 lesions.      He had  liver transplant in Dec 2018 and explanted liver showed S8 viable tumor with 90% necrosis.  S7 1.1 cm HCC and S5 1.4 cm HCC.  3 benign lymph nodes.  It was a ypT2N0 lesion.    Currently feels good.  No evidence of recurrence.  I wanted to repeat his scans around the end of the year but he mentioned that he will be going to [place] in October and then he will be back around end of January 2024.  Therefore we will repeat labs and MRI when he comes back.     Elevated blood pressure.  His blood pressure in the clinic was elevated.  He is asymptomatic.  I recommend that he checks his blood pressures at home regularly when he is relaxed and keep a log of it.  If it is persistently high then he should talk to his primary care physician for better blood pressure management.  I am not making changes to his antihypertensive regimen today.  I gave him a prescription for blood pressure monitor as well.      Prostate Cancer. He developed prostate cancer (4/26/19 - PSA 5.51ng/dL) and is now s/p RALP with Dr. Casiano on 1/3/20, final path: [place] 3+4=7, neg margins, [id number].   Last PSA was <0.01 on 10/21/2022.  Continue to follow with urology.          We did not address the following today.  SOB. Has ENGLISH and has mild ankle swelling. No orthopnea or PND.  Cardiac catheterization October 2021 showed 60% mid LAD stenosis and he had drug-eluting stent placed.  He has been a chronic smoker and has HTN HL and DM-he is following with cardiology.          Pulmonary nodule-indeterminate 4 mm nodule in  right lower lobe has now resolved. Will not repeat routine imaging for chest for now       RTC in February 2024 with lab/MRI prior    All questions answered and he agrees with the plan    Hi Melgar

## 2023-05-04 ENCOUNTER — TELEPHONE (OUTPATIENT)
Dept: DERMATOLOGY | Facility: CLINIC | Age: 68
End: 2023-05-04
Payer: COMMERCIAL

## 2023-05-04 NOTE — TELEPHONE ENCOUNTER
Maciej. 1st attempt informing patient to call [phone] to schedule 2 year follow up with  or Dr. Sapp  in Dermatology.

## 2023-05-09 ENCOUNTER — OFFICE VISIT (OUTPATIENT)
Dept: FAMILY MEDICINE | Facility: CLINIC | Age: 68
End: 2023-05-09
Payer: COMMERCIAL

## 2023-05-09 VITALS
DIASTOLIC BLOOD PRESSURE: 82 MMHG | HEART RATE: 67 BPM | OXYGEN SATURATION: 95 % | HEIGHT: 68 IN | BODY MASS INDEX: 30.23 KG/M2 | WEIGHT: 199.5 LBS | SYSTOLIC BLOOD PRESSURE: 150 MMHG

## 2023-05-09 DIAGNOSIS — R31.9 HEMATURIA, UNSPECIFIED TYPE: Primary | ICD-10-CM

## 2023-05-09 DIAGNOSIS — Z94.4 LIVER TRANSPLANTED (H): ICD-10-CM

## 2023-05-09 DIAGNOSIS — E11.9 DM TYPE 2, GOAL HBA1C 7%-8% (H): ICD-10-CM

## 2023-05-09 LAB
ALBUMIN UR-MCNC: 300 MG/DL
APPEARANCE UR: CLEAR
BILIRUB UR QL STRIP: NEGATIVE
COLOR UR AUTO: ABNORMAL
GLUCOSE UR STRIP-MCNC: NEGATIVE MG/DL
HGB UR QL STRIP: ABNORMAL
KETONES UR STRIP-MCNC: NEGATIVE MG/DL
LEUKOCYTE ESTERASE UR QL STRIP: NEGATIVE
MUCOUS THREADS #/AREA URNS LPF: PRESENT /LPF
NITRATE UR QL: NEGATIVE
PH UR STRIP: 5.5 [PH] (ref 5–7)
RBC URINE: <1 /HPF
SP GR UR STRIP: 1.02 (ref 1–1.03)
SQUAMOUS EPITHELIAL: 1 /HPF
UROBILINOGEN UR STRIP-MCNC: NORMAL MG/DL
WBC URINE: <1 /HPF

## 2023-05-09 PROCEDURE — 0124A COVID-19 BIVALENT 12+ (PFIZER): CPT | Performed by: FAMILY MEDICINE

## 2023-05-09 PROCEDURE — 99214 OFFICE O/P EST MOD 30 MIN: CPT | Mod: 25 | Performed by: FAMILY MEDICINE

## 2023-05-09 PROCEDURE — 91312 COVID-19 BIVALENT 12+ (PFIZER): CPT | Performed by: FAMILY MEDICINE

## 2023-05-09 PROCEDURE — 81001 URINALYSIS AUTO W/SCOPE: CPT | Performed by: PATHOLOGY

## 2023-05-09 NOTE — NURSING NOTE
"Loy Limon is a 67 year old male patient that presents today in clinic for the following:    Chief Complaint   Patient presents with     Follow Up     Pt here for 3 month follow up and would like to discuss liver concerns.     The patient's allergies and medications were reviewed as noted. A set of vitals were recorded as noted without incident: BP (!) 171/83 (BP Location: Right arm, Patient Position: Sitting, Cuff Size: Adult Regular)   Pulse 67   Ht 1.727 m (5' 7.99\")   Wt 90.5 kg (199 lb 8 oz)   SpO2 95%   BMI 30.34 kg/m  . The patient does not have any other questions for the provider.    Tanisha Dai, EMT at 8:10 AM on 5/9/2023  "

## 2023-05-09 NOTE — PROGRESS NOTES
"    Assessment & Plan     Hematuria, unspecified type  Keep planned appt Urology  - UA Macroscopic with reflex to Microscopic and Culture; Future  - UA Macroscopic with reflex to Microscopic and Culture    DM type 2, goal HbA1c 7%-8% (H)  Cont as is  - COVID-19 BIVALENT 12+ (PFIZER)  - Adult Eye  Referral; Future    Liver transplanted (H)  Discussed post transplant management and health to his satisfaction he had gernal questions about management and health post transplant      35 minutes spent by me on the date of the encounter doing chart review, history and exam, documentation and further activities per the note all via     BMI:   Estimated body mass index is 30.34 kg/m  as calculated from the following:    Height as of this encounter: 1.727 m (5' 7.99\").    Weight as of this encounter: 90.5 kg (199 lb 8 oz).     No follow-ups on file.    Jaswinder Anne MD  [facility]   Loy is a 67 year old, presenting for the following health issues:  Follow Up (Pt here for 3 month follow up and would like to discuss liver concerns.)    HPI Here in f/u  1-he has general questions about health post transplant, discussed to his satisfaction  2-all today via  ipad  3-hematuria: sees Urology in summer; now only  c/o is sense of incomplete voiding, will redo UA  4-DM: due for eye MD  5-due for covid booster  Past Medical History:   Diagnosis Date     Anemia      Biliary stricture of transplanted liver (H) 2018     BPH (benign prostatic hyperplasia)      Cancer (H)     hepatocellualr carcinoma     Cirrhosis of liver (H) 2018     Diabetes (H)      Enlarged prostate      Hypothyroidism      Impotence of organic origin 2020     Inguinal hernia     Repaired with mesh on 18     Liver lesion 2018     Liver transplanted (H) 2018     donor liver transplant     Portal vein thrombosis     on path explant "     Postoperative atrial fibrillation (H) 12/25/2018     Prostate cancer (H)      TB lung, latent     Treated      Past Surgical History:   Procedure Laterality Date     APPENDECTOMY       COLONOSCOPY      2018     CV CORONARY ANGIOGRAM N/A 10/13/2021    Procedure: CV CORONARY ANGIOGRAM;  Surgeon: Yaya Hampton MD;  Location:  HEART CARDIAC CATH LAB     CV CORONARY LITHOTRIPSY PCI N/A 10/13/2021    Procedure: CV Coronary Lithotripsy PCI;  Surgeon: Yaya Hampton MD;  Location:  HEART CARDIAC CATH LAB     CV INSTANTANEOUS WAVE-FREE RATIO N/A 10/13/2021    Procedure: Instantaneous Wave-Free Ratio;  Surgeon: Yaya Hampton MD;  Location:  HEART CARDIAC CATH LAB     CV INTRAVASULAR ULTRASOUND N/A 10/13/2021    Procedure: Intravascular Ultrasound;  Surgeon: Yaya Hampton MD;  Location:  HEART CARDIAC CATH LAB     CV PCI STENT DRUG ELUTING N/A 10/13/2021    Procedure: Percutaneous Coronary Intervention Stent Drug Eluting;  Surgeon: Yaya Hampton MD;  Location:  HEART CARDIAC CATH LAB     DAVINCI PROSTATECTOMY N/A 1/3/2020    Procedure: Robot-assisted laparoscopic radical prostatectomy, Bladder biopsy;  Surgeon: Kenrick Casiano MD;  Location:  OR     ENDOSCOPIC RETROGRADE CHOLANGIOPANCREATOGRAM N/A 12/28/2018    Procedure: ENDOSCOPIC RETROGRADE CHOLANGIOPANCREATOGRAM, with Billary Sphincterotomy and Billary Stent Placement;  Surgeon: Omreo Lawler MD;  [facility]     ENDOSCOPIC RETROGRADE CHOLANGIOPANCREATOGRAM  2/18/2019    Procedure: COMBINED ENDOSCOPIC RETROGRADE CHOLANGIOPANCREATOGRAPHY, Bile Duct Stent Exchange;  Surgeon: Omero Lawler MD;  [facility]     ENDOSCOPIC RETROGRADE CHOLANGIOPANCREATOGRAM N/A 4/29/2019    Procedure: ENDOSCOPIC RETROGRADE CHOLANGIOPANCREATOGRAPHY, WITH BILIARY STENT REMOVAL AND STONE EXTRACTION;  Surgeon: Omero Lawler MD;  Location:  OR      "HERNIORRHAPHY INGUINAL  2018    Procedure: Herniorrhaphy inguinal;  Surgeon: Emil Echevarria MD;  [facility]     IR LIVER BIOPSY PERCUTANEOUS  2018     LAPAROTOMY EXPLORATORY      per the patient \"for infection in the LLQ\"      TRANSPLANT LIVER RECIPIENT  DONOR N/A 2018    Procedure: TRANSPLANT LIVER RECIPIENT  DONOR;  Surgeon: Emil Echevarria MD;  [facility]     Current Outpatient Medications   Medication     amLODIPine (NORVASC) 10 MG tablet     atorvastatin (LIPITOR) 40 MG tablet     blood glucose (NO BRAND SPECIFIED) lancets standard     blood glucose (NO BRAND SPECIFIED) test strip     blood glucose (ONE TOUCH SURESOFT) lancing device     blood glucose monitoring (ONE TOUCH ULTRA 2) meter device kit     blood glucose monitoring (ONE TOUCH ULTRASOFT) lancets     Blood Pressure Monitor KIT     dulaglutide (TRULICITY) 1.5 MG/0.5ML pen     guaiFENesin-dextromethorphan (ROBITUSSIN DM) 100-10 MG/5ML syrup     insulin glargine (LANTUS PEN) 100 UNIT/ML pen     insulin pen needle (31G X 5 MM) 31G X 5 MM miscellaneous     levothyroxine (SYNTHROID/LEVOTHROID) 137 MCG tablet     losartan (COZAAR) 100 MG tablet     metFORMIN (GLUCOPHAGE XR) 500 MG 24 hr tablet     mycophenolate (GENERIC EQUIVALENT) 250 MG capsule     nitroGLYcerin (NITROSTAT) 0.4 MG sublingual tablet     predniSONE (DELTASONE) 5 MG tablet     sitagliptin (JANUVIA) 50 MG tablet     ursodiol (ACTIGALL) 250 MG tablet     Vitamin D3 (CHOLECALCIFEROL) 25 mcg (1000 units) tablet     No current facility-administered medications for this visit.     No Known Allergies          Review of Systems         Objective    BP (!) 150/82 (BP Location: Right arm, Patient Position: Sitting, Cuff Size: Adult Regular)   Pulse 67   Ht 1.727 m (5' 7.99\")   Wt 90.5 kg (199 lb 8 oz)   SpO2 95%   BMI 30.34 kg/m    Body mass index is 30.34 kg/m .  Physical Exam   GENERAL: healthy, alert and no distress  ABDOMEN: soft, " nontender, no hepatosplenomegaly, no masses and bowel sounds normal  MS: no gross musculoskeletal defects noted, no edema

## 2023-05-18 ENCOUNTER — TELEPHONE (OUTPATIENT)
Dept: NEPHROLOGY | Facility: CLINIC | Age: 68
End: 2023-05-18
Payer: COMMERCIAL

## 2023-05-18 NOTE — TELEPHONE ENCOUNTER
Called patient via  service with a appointment reminder for Wed. 5/24/23 @ 2:00 pm with Nai Juarez and a lab draw @ 1:15pm    Panfilo Koenig on 5/18/2023 at 9:45 AM

## 2023-05-22 DIAGNOSIS — N18.2 CKD (CHRONIC KIDNEY DISEASE) STAGE 2, GFR 60-89 ML/MIN: Primary | ICD-10-CM

## 2023-05-24 ENCOUNTER — LAB (OUTPATIENT)
Dept: LAB | Facility: CLINIC | Age: 68
End: 2023-05-24
Payer: COMMERCIAL

## 2023-05-24 DIAGNOSIS — N18.2 CKD (CHRONIC KIDNEY DISEASE) STAGE 2, GFR 60-89 ML/MIN: ICD-10-CM

## 2023-05-24 LAB
ALBUMIN MFR UR ELPH: 548 MG/DL (ref 1–14)
ALBUMIN SERPL BCG-MCNC: 3.8 G/DL (ref 3.5–5.2)
ANION GAP SERPL CALCULATED.3IONS-SCNC: 8 MMOL/L (ref 7–15)
BUN SERPL-MCNC: 26.2 MG/DL (ref 8–23)
CALCIUM SERPL-MCNC: 9 MG/DL (ref 8.8–10.2)
CHLORIDE SERPL-SCNC: 104 MMOL/L (ref 98–107)
CREAT SERPL-MCNC: 1.23 MG/DL (ref 0.67–1.17)
CREAT UR-MCNC: 159 MG/DL
DEPRECATED HCO3 PLAS-SCNC: 26 MMOL/L (ref 22–29)
GFR SERPL CREATININE-BSD FRML MDRD: 64 ML/MIN/1.73M2
GLUCOSE SERPL-MCNC: 269 MG/DL (ref 70–99)
HGB BLD-MCNC: 14.4 G/DL (ref 13.3–17.7)
PHOSPHATE SERPL-MCNC: 3.6 MG/DL (ref 2.5–4.5)
POTASSIUM SERPL-SCNC: 5.9 MMOL/L (ref 3.4–5.3)
PROT/CREAT 24H UR: 3.45 MG/MG CR (ref 0–0.2)
SODIUM SERPL-SCNC: 138 MMOL/L (ref 136–145)

## 2023-05-24 PROCEDURE — 80069 RENAL FUNCTION PANEL: CPT | Performed by: PATHOLOGY

## 2023-05-24 PROCEDURE — 36415 COLL VENOUS BLD VENIPUNCTURE: CPT | Performed by: PATHOLOGY

## 2023-05-24 PROCEDURE — 85018 HEMOGLOBIN: CPT | Performed by: PATHOLOGY

## 2023-05-24 PROCEDURE — 84156 ASSAY OF PROTEIN URINE: CPT | Performed by: PATHOLOGY

## 2023-06-12 DIAGNOSIS — E03.8 OTHER SPECIFIED HYPOTHYROIDISM: ICD-10-CM

## 2023-06-14 ENCOUNTER — LAB (OUTPATIENT)
Dept: LAB | Facility: CLINIC | Age: 68
End: 2023-06-14
Payer: COMMERCIAL

## 2023-06-14 ENCOUNTER — PRE VISIT (OUTPATIENT)
Dept: NEUROLOGY | Facility: CLINIC | Age: 68
End: 2023-06-14

## 2023-06-14 ENCOUNTER — OFFICE VISIT (OUTPATIENT)
Dept: NEUROLOGY | Facility: CLINIC | Age: 68
End: 2023-06-14
Attending: FAMILY MEDICINE
Payer: COMMERCIAL

## 2023-06-14 VITALS
HEART RATE: 70 BPM | WEIGHT: 199 LBS | DIASTOLIC BLOOD PRESSURE: 73 MMHG | OXYGEN SATURATION: 95 % | BODY MASS INDEX: 30.26 KG/M2 | SYSTOLIC BLOOD PRESSURE: 135 MMHG

## 2023-06-14 DIAGNOSIS — G62.9 NEUROPATHY: ICD-10-CM

## 2023-06-14 DIAGNOSIS — R41.3 MEMORY LOSS: ICD-10-CM

## 2023-06-14 DIAGNOSIS — Z94.4 LIVER REPLACED BY TRANSPLANT (H): ICD-10-CM

## 2023-06-14 DIAGNOSIS — R41.3 MEMORY LOSS: Primary | ICD-10-CM

## 2023-06-14 LAB
ALBUMIN SERPL BCG-MCNC: 3.8 G/DL (ref 3.5–5.2)
ALP SERPL-CCNC: 192 U/L (ref 40–129)
ALT SERPL W P-5'-P-CCNC: 31 U/L (ref 0–70)
AMMONIA PLAS-SCNC: 32 UMOL/L (ref 16–60)
ANION GAP SERPL CALCULATED.3IONS-SCNC: 8 MMOL/L (ref 7–15)
AST SERPL W P-5'-P-CCNC: 22 U/L (ref 0–45)
BILIRUB DIRECT SERPL-MCNC: <0.2 MG/DL (ref 0–0.3)
BILIRUB SERPL-MCNC: 0.5 MG/DL
BUN SERPL-MCNC: 21.4 MG/DL (ref 8–23)
CALCIUM SERPL-MCNC: 9.2 MG/DL (ref 8.8–10.2)
CHLORIDE SERPL-SCNC: 104 MMOL/L (ref 98–107)
CREAT SERPL-MCNC: 0.94 MG/DL (ref 0.67–1.17)
DEPRECATED HCO3 PLAS-SCNC: 27 MMOL/L (ref 22–29)
ERYTHROCYTE [DISTWIDTH] IN BLOOD BY AUTOMATED COUNT: 12.4 % (ref 10–15)
GFR SERPL CREATININE-BSD FRML MDRD: 87 ML/MIN/1.73M2
GLUCOSE SERPL-MCNC: 195 MG/DL (ref 70–99)
HCT VFR BLD AUTO: 44.7 % (ref 40–53)
HGB BLD-MCNC: 15 G/DL (ref 13.3–17.7)
MCH RBC QN AUTO: 28.8 PG (ref 26.5–33)
MCHC RBC AUTO-ENTMCNC: 33.6 G/DL (ref 31.5–36.5)
MCV RBC AUTO: 86 FL (ref 78–100)
PLATELET # BLD AUTO: 230 10E3/UL (ref 150–450)
POTASSIUM SERPL-SCNC: 4.8 MMOL/L (ref 3.4–5.3)
PROT SERPL-MCNC: 6.6 G/DL (ref 6.4–8.3)
RBC # BLD AUTO: 5.2 10E6/UL (ref 4.4–5.9)
SODIUM SERPL-SCNC: 139 MMOL/L (ref 136–145)
T4 FREE SERPL-MCNC: 1.18 NG/DL (ref 0.9–1.7)
TSH SERPL DL<=0.005 MIU/L-ACNC: 4.38 UIU/ML (ref 0.3–4.2)
VIT B12 SERPL-MCNC: 270 PG/ML (ref 232–1245)
WBC # BLD AUTO: 8.2 10E3/UL (ref 4–11)

## 2023-06-14 PROCEDURE — 84425 ASSAY OF VITAMIN B-1: CPT | Mod: 90 | Performed by: PATHOLOGY

## 2023-06-14 PROCEDURE — 80053 COMPREHEN METABOLIC PANEL: CPT | Performed by: PATHOLOGY

## 2023-06-14 PROCEDURE — 84443 ASSAY THYROID STIM HORMONE: CPT | Performed by: PATHOLOGY

## 2023-06-14 PROCEDURE — 86780 TREPONEMA PALLIDUM: CPT | Mod: 90 | Performed by: PATHOLOGY

## 2023-06-14 PROCEDURE — 82140 ASSAY OF AMMONIA: CPT | Performed by: PATHOLOGY

## 2023-06-14 PROCEDURE — 99205 OFFICE O/P NEW HI 60 MIN: CPT | Performed by: PSYCHIATRY & NEUROLOGY

## 2023-06-14 PROCEDURE — 85027 COMPLETE CBC AUTOMATED: CPT | Performed by: PATHOLOGY

## 2023-06-14 PROCEDURE — 82248 BILIRUBIN DIRECT: CPT | Performed by: PATHOLOGY

## 2023-06-14 PROCEDURE — 83090 ASSAY OF HOMOCYSTEINE: CPT | Mod: 90 | Performed by: PATHOLOGY

## 2023-06-14 PROCEDURE — 83921 ORGANIC ACID SINGLE QUANT: CPT | Mod: 90 | Performed by: PATHOLOGY

## 2023-06-14 PROCEDURE — 82607 VITAMIN B-12: CPT | Mod: 90 | Performed by: PATHOLOGY

## 2023-06-14 PROCEDURE — 84439 ASSAY OF FREE THYROXINE: CPT | Performed by: PATHOLOGY

## 2023-06-14 PROCEDURE — 99000 SPECIMEN HANDLING OFFICE-LAB: CPT | Performed by: PATHOLOGY

## 2023-06-14 PROCEDURE — 36415 COLL VENOUS BLD VENIPUNCTURE: CPT | Performed by: PATHOLOGY

## 2023-06-14 ASSESSMENT — PAIN SCALES - GENERAL: PAINLEVEL: NO PAIN (0)

## 2023-06-14 NOTE — LETTER
6/14/2023       RE: Loy Limon  [address]  [facility] [address]     Dear Colleague,    Thank you for referring your patient, Loy Limon, to the [facility] NEUROLOGY [facility]. Please see a copy of my visit note below.    [facility] Neurology Consultation    Loy Limon MRN# [id number]   Age: 68 year old YOB: 1955     Requesting physician: Jaswinder Mon     Reason for Consultation: cognitive concerns      History of Presenting Symptoms:   Loy Limon is a 68 year old male who presents today for evaluation of cognitive concerns.    The patient has a pertinent medical history of DMII with polyneuropathy, HTN, prostate cancer, hepatocellular carcinoma s/p liver transplant requiring subsequent immunosuppression, CKD, and pulmonary TB.    The patient was seen with their PCP on 1/27/2023 where it was noted he was having gradual memory loss, making him feel less sharp than usual.  He described he was not requiring any help from his wife for daily functioning.    Today, the patient is alone.  His primary concern is of his diabetes and management of this.  He has noted that he may forget things at times.  He describes forgetting why he is using a knife after getting it from the kitchen, or forgetting his keys all the time.  These issues have become more prominent in the last few years, mostly after his liver transplant occurred.  He does note that when his blood sugar goes up or down these memory issues are worse.  He doesn't feel he is having difficulty with managing his medications.  He has had issues with driving, and describes an event of forgetting that he is going to get gas while driving to do this task, leading him to drive past the gas station.  He doesn't report getting lost in familiar locations.  There are no issues of paying bills incorrectly or making errors in managing his  "money at this time. He doesn't feel anyone has scammed him in terms of finances or other ways in the last few years.    He hasn't noted any behavioral changes, tremors, atypical movements, losses of consciousness, lack of strength or sensation on a side of his body or face, daytime sleepiness, snoring, or movements at night/acting out his dreams.      He does feel his wife has mentioned some concerns to him, as if he is \"loosing his mind\".  He tells me she has described to him concerns about being grouchy/cranky when confronted with frustration (related to tasks or not remembering things).      He is still taking a daily steroid.  He snores nightly, and wasn't reported (by him) that he stops breathing at night.  He doesn't often take a nap in the daytime.  He can be tired when waking, but this improves as the day goes on.     Social History:   Quit drinking alcohol in 2/2018, and he was considered to be an alcoholic in the past with noted withdraws occurring at time.  Quit smoking 2018, was a 0.03 ppd smoker for 20 years. 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 His highest level of education is that of never attending a school.  He did learn to read by himself, and can write letters as well as his name easily.      Medications:   Amlodipine  Atorvastatin  Dulaglutide  Insulin  Levothyroxine  Losartan  Metformin  Mycophenolate  Prednisone  Sitagliptan     Physical Exam:   Vitals: /73 (BP Location: Right arm, Patient Position: Sitting, Cuff Size: Adult Large)   Pulse 70   Wt 90.3 kg (199 lb)   SpO2 95%   BMI 30.26 kg/m     General: Seated comfortably in no acute distress.  HEENT: Neck supple with normal range of motion. No paracervical muscle tenderness or tightness.  Optic discs sharp and vasculature normal on funduscopic exam.   Skin: No rashes  Neurologic:     Mental Status: Fully alert, attentive and oriented. Speech clear and fluent, no paraphasic errors.  MiniCOG: " 2/5 ~ can draw a [place] for the clock face but numbers are not placed in correct spacing x2.  Hand placement is incorrect. 1/3 recall.       Cranial Nerves: Visual fields intact. PERRL. EOMI with normal smooth pursuit. Facial sensation intact/symmetric. Facial movements symmetric. Hearing not formally tested but intact to conversation. Palate elevation symmetric, uvula midline. No dysarthria. Shoulder shrug strong bilaterally. Tongue protrusion midline.     Motor: No tremors or other abnormal movements observed. Muscle tone normal throughout. No pronator drift. Normal/symmetric rapid finger tapping. Strength 5/5 throughout upper and lower extremities.     Deep Tendon Reflexes: 2+/symmetric throughout upper and lower extremities. No clonus. Toes downgoing bilaterally.     Sensory: Intact/symmetric to light touch, pinprick, throughout upper and lower extremities. Vibration is reduced in toes and ankles/MM b/l but full (5-8 seconds at knees).  Positive Romberg.      Coordination: Finger-nose-finger with minor dysmetria upon reaching target on left hand, as well as well as minor ataxia b/l. Rapid alternating movements intact/symmetric but both become slow and clumsy at higher speeds.     Gait: Normal, steady casual gait. Able to walk on toes, heels and tandem without difficulty.         Data: Pertinent prior to visit   Imaging:  None         Assessment and Plan:   Assessment:  Cognitive concerns, undefined/mixed  Polyneuropathy, likely related to diabetes and alcohol use    The patient has relatively acute onset of cognitive issues which seem to be related to attention/inattention, short term recall, and executive function. I suspect there maybe a subcortical process like vascular disease present given his medical history, but further investigation into alternative etiologies like strokes, tumor, metabolic encephalopathies, polypharmacy (specifically steroid induced delirium), and sleep disorders like NILDA need to be  done.  He would benefit from an official MoCA through an OT visit with [place] interpretation as well.  At our follow up visit, I would want him to have his spouse present to further define his reports of behavioral changes.    Plan:  - B1, B12, MMA, Homocysteine, RPR, ammonia, TSH  - MTM consult for polypharmacy causing cognitive issues of attention/inattention  - MRI brain w/wout contrast  - OT for MoCA (can be done in [place])  - sleep referral for NILDA consideration (STOP-BANG of 6)    Follow up in Neurology clinic in 3 months, or should new concerns arise.    KARINA Kaminski D.O.   of Neurology    Total time today (75 min) in this patient encounter was spent on pre-charting, counseling and/or coordination of care.. The patient is in agreement with this plan and has no further questions.

## 2023-06-14 NOTE — NURSING NOTE
Chief Complaint   Patient presents with     Consult     Per patient diabetes concerns  Patient would like a blood pressure monitor kit (may need a letter for Medical Necessity) so that insurance will cover.     Linh Koenig CMA at 1:58 PM on 6/14/2023.

## 2023-06-14 NOTE — PROGRESS NOTES
[facility] Neurology Consultation    Loy Limon MRN# [id number]   Age: 68 year old YOB: 1955     Requesting physician: Jaswinder Mon     Reason for Consultation: cognitive concerns      History of Presenting Symptoms:   Loy Limon is a 68 year old male who presents today for evaluation of cognitive concerns.    The patient has a pertinent medical history of DMII with polyneuropathy, HTN, prostate cancer, hepatocellular carcinoma s/p liver transplant requiring subsequent immunosuppression, CKD, and pulmonary TB.    The patient was seen with their PCP on 1/27/2023 where it was noted he was having gradual memory loss, making him feel less sharp than usual.  He described he was not requiring any help from his wife for daily functioning.    Today, the patient is alone.  His primary concern is of his diabetes and management of this.  He has noted that he may forget things at times.  He describes forgetting why he is using a knife after getting it from the kitchen, or forgetting his keys all the time.  These issues have become more prominent in the last few years, mostly after his liver transplant occurred.  He does note that when his blood sugar goes up or down these memory issues are worse.  He doesn't feel he is having difficulty with managing his medications.  He has had issues with driving, and describes an event of forgetting that he is going to get gas while driving to do this task, leading him to drive past the gas station.  He doesn't report getting lost in familiar locations.  There are no issues of paying bills incorrectly or making errors in managing his money at this time. He doesn't feel anyone has scammed him in terms of finances or other ways in the last few years.    He hasn't noted any behavioral changes, tremors, atypical movements, losses of consciousness, lack of strength or sensation on a side of his body or face, daytime sleepiness, snoring, or movements  "at night/acting out his dreams.      He does feel his wife has mentioned some concerns to him, as if he is \"loosing his mind\".  He tells me she has described to him concerns about being grouchy/cranky when confronted with frustration (related to tasks or not remembering things).      He is still taking a daily steroid.  He snores nightly, and wasn't reported (by him) that he stops breathing at night.  He doesn't often take a nap in the daytime.  He can be tired when waking, but this improves as the day goes on.     Social History:   Quit drinking alcohol in 2018, and he was considered to be an alcoholic in the past with noted withdraws occurring at time.  Quit smoking , was a 0.03 ppd smoker for 20 years. 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 His highest level of education is that of never attending a school.  He did learn to read by himself, and can write letters as well as his name easily.      Medications:   Amlodipine  Atorvastatin  Dulaglutide  Insulin  Levothyroxine  Losartan  Metformin  Mycophenolate  Prednisone  Sitagliptan     Physical Exam:   Vitals: /73 (BP Location: Right arm, Patient Position: Sitting, Cuff Size: Adult Large)   Pulse 70   Wt 90.3 kg (199 lb)   SpO2 95%   BMI 30.26 kg/m     General: Seated comfortably in no acute distress.  HEENT: Neck supple with normal range of motion. No paracervical muscle tenderness or tightness.  Optic discs sharp and vasculature normal on funduscopic exam.   Skin: No rashes  Neurologic:     Mental Status: Fully alert, attentive and oriented. Speech clear and fluent, no paraphasic errors.  MiniCO/5 ~ can draw a [place] for the clock face but numbers are not placed in correct spacing x2.  Hand placement is incorrect. 1/3 recall.       Cranial Nerves: Visual fields intact. PERRL. EOMI with normal smooth pursuit. Facial sensation intact/symmetric. Facial movements symmetric. Hearing not formally tested but " intact to conversation. Palate elevation symmetric, uvula midline. No dysarthria. Shoulder shrug strong bilaterally. Tongue protrusion midline.     Motor: No tremors or other abnormal movements observed. Muscle tone normal throughout. No pronator drift. Normal/symmetric rapid finger tapping. Strength 5/5 throughout upper and lower extremities.     Deep Tendon Reflexes: 2+/symmetric throughout upper and lower extremities. No clonus. Toes downgoing bilaterally.     Sensory: Intact/symmetric to light touch, pinprick, throughout upper and lower extremities. Vibration is reduced in toes and ankles/MM b/l but full (5-8 seconds at knees).  Positive Romberg.      Coordination: Finger-nose-finger with minor dysmetria upon reaching target on left hand, as well as well as minor ataxia b/l. Rapid alternating movements intact/symmetric but both become slow and clumsy at higher speeds.     Gait: Normal, steady casual gait. Able to walk on toes, heels and tandem without difficulty.         Data: Pertinent prior to visit   Imaging:  None         Assessment and Plan:   Assessment:  Cognitive concerns, undefined/mixed  Polyneuropathy, likely related to diabetes and alcohol use    The patient has relatively acute onset of cognitive issues which seem to be related to attention/inattention, short term recall, and executive function. I suspect there maybe a subcortical process like vascular disease present given his medical history, but further investigation into alternative etiologies like strokes, tumor, metabolic encephalopathies, polypharmacy (specifically steroid induced delirium), and sleep disorders like NILDA need to be done.  He would benefit from an official MoCA through an OT visit with [place] interpretation as well.  At our follow up visit, I would want him to have his spouse present to further define his reports of behavioral changes.    Plan:  - B1, B12, MMA, Homocysteine, RPR, ammonia, TSH  - MTM consult for polypharmacy  causing cognitive issues of attention/inattention  - MRI brain w/wout contrast  - OT for MoCA (can be done in [place])  - sleep referral for NILDA consideration (STOP-BANG of 6)    Follow up in Neurology clinic in 3 months, or should new concerns arise.    KARINA Kaminski D.O.   of Neurology    Total time today (75 min) in this patient encounter was spent on pre-charting, counseling and/or coordination of care.. The patient is in agreement with this plan and has no further questions.

## 2023-06-14 NOTE — PATIENT INSTRUCTIONS
Your cognitive concerns need further investigation.  I think your issue may relate to things like your prior alcohol use, current steroid use, ongoing diabetes, and possibly from a sleep disorder.  It is not clear that your issues are directly related to a brain injury or a progressive neurological disorder.    - B1, B12, MMA, Homocysteine, RPR, ammonia, TSH  - MTM consult for polypharmacy  - MRI brain w/wout contrast  - OT for MoCA (can be done in [place])  - sleep referral for NILDA consideration

## 2023-06-15 ENCOUNTER — TRANSFERRED RECORDS (OUTPATIENT)
Dept: HEALTH INFORMATION MANAGEMENT | Facility: CLINIC | Age: 68
End: 2023-06-15
Payer: COMMERCIAL

## 2023-06-15 LAB
HCYS SERPL-SCNC: 13.2 UMOL/L (ref 4–12)
T PALLIDUM AB SER QL: NONREACTIVE

## 2023-06-15 RX ORDER — LEVOTHYROXINE SODIUM 137 UG/1
137 TABLET ORAL DAILY
Qty: 90 TABLET | Status: CANCELLED | OUTPATIENT
Start: 2023-06-15

## 2023-06-15 NOTE — TELEPHONE ENCOUNTER
levothyroxine (SYNTHROID/LEVOTHROID) 137 MCG tablet      Last Written Prescription Date:  01-  Last Fill Quantity: 90,   # refills: 1  Last Office Visit : 5-9-2023  Future Office visit:  9-5-2023    Routing refill request to provider for review/approval because:  Thyroid Protocol Failed 06/14/2023 12:17 PM   Protocol Details  Normal TSH on file in past 12 months     Lab Test 06/14/23  1556   TSH 4.38*

## 2023-06-16 NOTE — TELEPHONE ENCOUNTER
Slightly low thyroid  Let's go up to synthroid 150 mcg a day  Ok 150 mcg pill  Take one po every day  90 with three refills    Redo tsh/reflex in two mo

## 2023-06-16 NOTE — TELEPHONE ENCOUNTER
TSH & T4free were ordered by a different provider.   Current levothyroxine dose is 137 mcg/day.  Please review and advise for the dosage. Thank you.    Mohamud-Mi

## 2023-06-17 LAB — VIT B1 PYROPHOSHATE BLD-SCNC: 205 NMOL/L

## 2023-06-20 RX ORDER — LEVOTHYROXINE SODIUM 150 UG/1
150 TABLET ORAL DAILY
Qty: 90 TABLET | Refills: 3 | Status: SHIPPED | OUTPATIENT
Start: 2023-06-20 | End: 2024-09-22

## 2023-06-21 ENCOUNTER — TELEPHONE (OUTPATIENT)
Dept: NEUROLOGY | Facility: CLINIC | Age: 68
End: 2023-06-21
Payer: COMMERCIAL

## 2023-06-21 NOTE — TELEPHONE ENCOUNTER
MTM referral from: [facility] visit (referral by provider)    MTM referral outreach attempt #2 on June 21, 2023 at 1:05 PM      Outcome: Patient not reachable after several attempts, will route to [facility] Pharmacist/Provider as an FYI.  [facility] scheduling number is [phone].  Thank you for the referral.    Use [facility] Part D for the carrier/Plan on the flowsheet    Mario Arthur [facility]

## 2023-06-22 LAB — METHYLMALONATE SERPL-SCNC: 0.53 UMOL/L (ref 0–0.4)

## 2023-06-27 ENCOUNTER — APPOINTMENT (OUTPATIENT)
Dept: INTERPRETER SERVICES | Facility: CLINIC | Age: 68
End: 2023-06-27
Payer: COMMERCIAL

## 2023-06-30 ENCOUNTER — DOCUMENTATION ONLY (OUTPATIENT)
Dept: LAB | Facility: CLINIC | Age: 68
End: 2023-06-30
Payer: COMMERCIAL

## 2023-06-30 DIAGNOSIS — E55.9 VITAMIN D DEFICIENCY, UNSPECIFIED: ICD-10-CM

## 2023-06-30 DIAGNOSIS — N18.2 CKD (CHRONIC KIDNEY DISEASE) STAGE 2, GFR 60-89 ML/MIN: Primary | ICD-10-CM

## 2023-07-07 DIAGNOSIS — E11.9 DM TYPE 2, GOAL HBA1C 7%-8% (H): ICD-10-CM

## 2023-07-07 DIAGNOSIS — Z94.4 LIVER REPLACED BY TRANSPLANT (H): Primary | ICD-10-CM

## 2023-07-07 DIAGNOSIS — Z94.4 LIVER TRANSPLANTED (H): ICD-10-CM

## 2023-07-07 RX ORDER — MYCOPHENOLATE MOFETIL 250 MG/1
CAPSULE ORAL
Qty: 540 CAPSULE | Refills: 3 | Status: ON HOLD | OUTPATIENT
Start: 2023-07-07 | End: 2024-03-08

## 2023-07-08 DIAGNOSIS — E11.9 DM TYPE 2, GOAL HBA1C 7%-8% (H): ICD-10-CM

## 2023-07-10 ENCOUNTER — TELEPHONE (OUTPATIENT)
Dept: CARDIOLOGY | Facility: CLINIC | Age: 68
End: 2023-07-10
Payer: COMMERCIAL

## 2023-07-10 NOTE — TELEPHONE ENCOUNTER
[facility] Call Center    Phone Message    May a detailed message be left on voicemail: no     Reason for Call: Order(s): Other:   Reason for requested: echo orders need to be extended for scheduling  Date needed: whenever possible for scheduling Feb 2024  Provider name: Mehnaz Nicholas    Pt stated he will be in [place] until end of January.  Echo orders need to be extended so pt can schedule 2/9 in the afternoon if possible.  Please extend and call pt to schedule annual follow up with echo.       Action Taken: Message routed to:  [facility] cardio    Travel Screening: Not Applicable

## 2023-07-12 RX ORDER — ATORVASTATIN CALCIUM 40 MG/1
40 TABLET, FILM COATED ORAL DAILY
Qty: 90 TABLET | Refills: 0 | Status: SHIPPED | OUTPATIENT
Start: 2023-07-12 | End: 2023-10-03

## 2023-07-12 NOTE — TELEPHONE ENCOUNTER
atorvastatin (LIPITOR) 40 MG tablet     Last Written Prescription Date:  07-  Last Fill Quantity: 90,   # refills: 3  Last Office Visit : 05-  Future Office visit:  9-5-2023    Routing refill request to provider for review/approval because:  Statins Protocol Failed 07/08/2023 10:03 AM   Protocol Details  LDL on file in past 12 months     Lab Test 01/25/22  0851   LDL 74     Nadia refill for overdue lab

## 2023-07-12 NOTE — TELEPHONE ENCOUNTER
Lipid profile ordered from another provider for future.     ADIA DICKSON RN on 7/12/2023 at 3:17 PM

## 2023-07-12 NOTE — TELEPHONE ENCOUNTER
sitagliptin (JANUVIA) 50 MG tablet       Last Written Prescription Date:  04-  Last Fill Quantity: 90,   # refills: 1  Last Office Visit : 4-4-2023  Future Office visit:  8-8-2023    Routing refill request to provider for review/approval because:  DPP4 Inhibitors Protocol Failed 07/07/2023 11:05 AM   Protocol Details  HgbA1C in past 3 or 6 months     Lab Test 01/27/23  1341     A1C 9.0*

## 2023-07-15 DIAGNOSIS — I10 ESSENTIAL HYPERTENSION: ICD-10-CM

## 2023-07-17 RX ORDER — AMLODIPINE BESYLATE 10 MG/1
10 TABLET ORAL DAILY
Qty: 90 TABLET | Refills: 1 | OUTPATIENT
Start: 2023-07-17

## 2023-07-19 ENCOUNTER — HOSPITAL ENCOUNTER (EMERGENCY)
Facility: CLINIC | Age: 68
Discharge: HOME | End: 2023-07-19
Attending: EMERGENCY MEDICINE | Admitting: EMERGENCY MEDICINE
Payer: COMMERCIAL

## 2023-07-19 ENCOUNTER — LAB (OUTPATIENT)
Dept: LAB | Facility: CLINIC | Age: 68
End: 2023-07-19
Payer: COMMERCIAL

## 2023-07-19 ENCOUNTER — TELEPHONE (OUTPATIENT)
Dept: TRANSPLANT | Facility: CLINIC | Age: 68
End: 2023-07-19

## 2023-07-19 ENCOUNTER — PATIENT OUTREACH (OUTPATIENT)
Dept: NEPHROLOGY | Facility: CLINIC | Age: 68
End: 2023-07-19

## 2023-07-19 ENCOUNTER — TELEPHONE (OUTPATIENT)
Dept: NEPHROLOGY | Facility: CLINIC | Age: 68
End: 2023-07-19

## 2023-07-19 VITALS
BODY MASS INDEX: 31.23 KG/M2 | HEART RATE: 68 BPM | WEIGHT: 199 LBS | HEIGHT: 67 IN | SYSTOLIC BLOOD PRESSURE: 161 MMHG | OXYGEN SATURATION: 97 % | DIASTOLIC BLOOD PRESSURE: 67 MMHG | RESPIRATION RATE: 16 BRPM | TEMPERATURE: 97.6 F

## 2023-07-19 DIAGNOSIS — E11.9 DM TYPE 2, GOAL HBA1C 7%-8% (H): ICD-10-CM

## 2023-07-19 DIAGNOSIS — N18.2 CKD (CHRONIC KIDNEY DISEASE) STAGE 2, GFR 60-89 ML/MIN: ICD-10-CM

## 2023-07-19 DIAGNOSIS — Z01.89 ROUTINE LAB DRAW: Primary | ICD-10-CM

## 2023-07-19 DIAGNOSIS — Z94.4 LIVER REPLACED BY TRANSPLANT (H): ICD-10-CM

## 2023-07-19 DIAGNOSIS — Z94.4 LIVER TRANSPLANTED (H): ICD-10-CM

## 2023-07-19 DIAGNOSIS — E55.9 VITAMIN D DEFICIENCY, UNSPECIFIED: ICD-10-CM

## 2023-07-19 LAB
ALBUMIN MFR UR ELPH: 559 MG/DL
ALBUMIN SERPL BCG-MCNC: 3.8 G/DL (ref 3.5–5.2)
ALBUMIN SERPL BCG-MCNC: 4 G/DL (ref 3.5–5.2)
ALBUMIN UR-MCNC: 600 MG/DL
ALP SERPL-CCNC: 150 U/L (ref 40–129)
ALP SERPL-CCNC: 163 U/L (ref 40–129)
ALT SERPL W P-5'-P-CCNC: 24 U/L (ref 0–70)
ALT SERPL W P-5'-P-CCNC: 28 U/L (ref 0–70)
ANION GAP SERPL CALCULATED.3IONS-SCNC: 10 MMOL/L (ref 7–15)
ANION GAP SERPL CALCULATED.3IONS-SCNC: 8 MMOL/L (ref 7–15)
APPEARANCE UR: CLEAR
AST SERPL W P-5'-P-CCNC: 20 U/L (ref 0–45)
AST SERPL W P-5'-P-CCNC: 37 U/L (ref 0–45)
BASOPHILS # BLD AUTO: 0 10E3/UL (ref 0–0.2)
BASOPHILS NFR BLD AUTO: 0 %
BILIRUB DIRECT SERPL-MCNC: <0.2 MG/DL (ref 0–0.3)
BILIRUB SERPL-MCNC: 0.4 MG/DL
BILIRUB SERPL-MCNC: 0.5 MG/DL
BILIRUB UR QL STRIP: NEGATIVE
BUN SERPL-MCNC: 22.3 MG/DL (ref 8–23)
BUN SERPL-MCNC: 22.5 MG/DL (ref 8–23)
CA-I BLD-MCNC: 5.1 MG/DL (ref 4.4–5.2)
CALCIUM SERPL-MCNC: 9.1 MG/DL (ref 8.8–10.2)
CALCIUM SERPL-MCNC: 9.3 MG/DL (ref 8.8–10.2)
CHLORIDE SERPL-SCNC: 107 MMOL/L (ref 98–107)
CHLORIDE SERPL-SCNC: 107 MMOL/L (ref 98–107)
CHOLEST SERPL-MCNC: 153 MG/DL
COLOR UR AUTO: YELLOW
CPB POCT: NO
CREAT SERPL-MCNC: 1 MG/DL (ref 0.67–1.17)
CREAT SERPL-MCNC: 1 MG/DL (ref 0.67–1.17)
CREAT UR-MCNC: 146 MG/DL
DEPRECATED CALCIDIOL+CALCIFEROL SERPL-MC: 17 UG/L (ref 20–75)
DEPRECATED HCO3 PLAS-SCNC: 22 MMOL/L (ref 22–29)
DEPRECATED HCO3 PLAS-SCNC: 26 MMOL/L (ref 22–29)
EOSINOPHIL # BLD AUTO: 0.1 10E3/UL (ref 0–0.7)
EOSINOPHIL NFR BLD AUTO: 1 %
ERYTHROCYTE [DISTWIDTH] IN BLOOD BY AUTOMATED COUNT: 12.6 % (ref 10–15)
ERYTHROCYTE [DISTWIDTH] IN BLOOD BY AUTOMATED COUNT: 12.9 % (ref 10–15)
GFR SERPL CREATININE-BSD FRML MDRD: 81 ML/MIN/1.73M2
GFR SERPL CREATININE-BSD FRML MDRD: 81 ML/MIN/1.73M2
GLUCOSE BLD-MCNC: 145 MG/DL (ref 70–99)
GLUCOSE SERPL-MCNC: 147 MG/DL (ref 70–99)
GLUCOSE SERPL-MCNC: 247 MG/DL (ref 70–99)
GLUCOSE UR STRIP-MCNC: 50 MG/DL
HCO3 BLDV-SCNC: 25 MMOL/L (ref 21–28)
HCT VFR BLD AUTO: 42.1 % (ref 40–53)
HCT VFR BLD AUTO: 43.2 % (ref 40–53)
HCT VFR BLD CALC: 39 % (ref 40–53)
HDLC SERPL-MCNC: 61 MG/DL
HGB BLD-MCNC: 13.3 G/DL (ref 13.3–17.7)
HGB BLD-MCNC: 13.7 G/DL (ref 13.3–17.7)
HGB BLD-MCNC: 13.9 G/DL (ref 13.3–17.7)
HGB UR QL STRIP: ABNORMAL
HOLD SPECIMEN: NORMAL
IMM GRANULOCYTES # BLD: 0 10E3/UL
IMM GRANULOCYTES NFR BLD: 0 %
KETONES UR STRIP-MCNC: NEGATIVE MG/DL
LDLC SERPL CALC-MCNC: 58 MG/DL
LEUKOCYTE ESTERASE UR QL STRIP: NEGATIVE
LYMPHOCYTES # BLD AUTO: 1.4 10E3/UL (ref 0.8–5.3)
LYMPHOCYTES NFR BLD AUTO: 17 %
MAGNESIUM SERPL-MCNC: 2.1 MG/DL (ref 1.7–2.3)
MCH RBC QN AUTO: 28.5 PG (ref 26.5–33)
MCH RBC QN AUTO: 29.2 PG (ref 26.5–33)
MCHC RBC AUTO-ENTMCNC: 31.7 G/DL (ref 31.5–36.5)
MCHC RBC AUTO-ENTMCNC: 33 G/DL (ref 31.5–36.5)
MCV RBC AUTO: 88 FL (ref 78–100)
MCV RBC AUTO: 90 FL (ref 78–100)
MONOCYTES # BLD AUTO: 0.4 10E3/UL (ref 0–1.3)
MONOCYTES NFR BLD AUTO: 5 %
MUCOUS THREADS #/AREA URNS LPF: PRESENT /LPF
NEUTROPHILS # BLD AUTO: 6.2 10E3/UL (ref 1.6–8.3)
NEUTROPHILS NFR BLD AUTO: 77 %
NITRATE UR QL: NEGATIVE
NONHDLC SERPL-MCNC: 92 MG/DL
NRBC # BLD AUTO: 0 10E3/UL
NRBC BLD AUTO-RTO: 0 /100
PCO2 BLDV: 48 MM HG (ref 40–50)
PH BLDV: 7.33 [PH] (ref 7.32–7.43)
PH UR STRIP: 6 [PH] (ref 5–7)
PHOSPHATE SERPL-MCNC: 3.1 MG/DL (ref 2.5–4.5)
PLATELET # BLD AUTO: 230 10E3/UL (ref 150–450)
PLATELET # BLD AUTO: 241 10E3/UL (ref 150–450)
PO2 BLDV: 27 MM HG (ref 25–47)
POTASSIUM BLD-SCNC: 5 MMOL/L (ref 3.4–5.3)
POTASSIUM SERPL-SCNC: 4.7 MMOL/L (ref 3.4–5.3)
POTASSIUM SERPL-SCNC: 5.6 MMOL/L (ref 3.4–5.3)
POTASSIUM SERPL-SCNC: 6.2 MMOL/L (ref 3.4–5.3)
PROT SERPL-MCNC: 6.4 G/DL (ref 6.4–8.3)
PROT SERPL-MCNC: 6.8 G/DL (ref 6.4–8.3)
PROT/CREAT 24H UR: 3.83 MG/MG CR (ref 0–0.2)
PTH-INTACT SERPL-MCNC: 134 PG/ML (ref 15–65)
RBC # BLD AUTO: 4.76 10E6/UL (ref 4.4–5.9)
RBC # BLD AUTO: 4.81 10E6/UL (ref 4.4–5.9)
RBC URINE: 4 /HPF
SAO2 % BLDV: 44 % (ref 94–100)
SODIUM BLD-SCNC: 140 MMOL/L (ref 133–144)
SODIUM SERPL-SCNC: 139 MMOL/L (ref 136–145)
SODIUM SERPL-SCNC: 141 MMOL/L (ref 136–145)
SP GR UR STRIP: 1.02 (ref 1–1.03)
SQUAMOUS EPITHELIAL: 1 /HPF
TRIGL SERPL-MCNC: 170 MG/DL
UROBILINOGEN UR STRIP-MCNC: NORMAL MG/DL
WBC # BLD AUTO: 6.9 10E3/UL (ref 4–11)
WBC # BLD AUTO: 8.1 10E3/UL (ref 4–11)
WBC URINE: 1 /HPF

## 2023-07-19 PROCEDURE — 82330 ASSAY OF CALCIUM: CPT

## 2023-07-19 PROCEDURE — 83735 ASSAY OF MAGNESIUM: CPT | Performed by: EMERGENCY MEDICINE

## 2023-07-19 PROCEDURE — 82306 VITAMIN D 25 HYDROXY: CPT

## 2023-07-19 PROCEDURE — 93010 ELECTROCARDIOGRAM REPORT: CPT | Performed by: EMERGENCY MEDICINE

## 2023-07-19 PROCEDURE — 83970 ASSAY OF PARATHORMONE: CPT | Performed by: PATHOLOGY

## 2023-07-19 PROCEDURE — 36415 COLL VENOUS BLD VENIPUNCTURE: CPT | Performed by: PATHOLOGY

## 2023-07-19 PROCEDURE — 83036 HEMOGLOBIN GLYCOSYLATED A1C: CPT

## 2023-07-19 PROCEDURE — 80053 COMPREHEN METABOLIC PANEL: CPT | Performed by: PATHOLOGY

## 2023-07-19 PROCEDURE — 82947 ASSAY GLUCOSE BLOOD QUANT: CPT

## 2023-07-19 PROCEDURE — 80061 LIPID PANEL: CPT | Performed by: PATHOLOGY

## 2023-07-19 PROCEDURE — 99283 EMERGENCY DEPT VISIT LOW MDM: CPT | Mod: 25 | Performed by: EMERGENCY MEDICINE

## 2023-07-19 PROCEDURE — 84155 ASSAY OF PROTEIN SERUM: CPT | Performed by: EMERGENCY MEDICINE

## 2023-07-19 PROCEDURE — 84156 ASSAY OF PROTEIN URINE: CPT | Performed by: PATHOLOGY

## 2023-07-19 PROCEDURE — 84132 ASSAY OF SERUM POTASSIUM: CPT | Performed by: EMERGENCY MEDICINE

## 2023-07-19 PROCEDURE — 82947 ASSAY GLUCOSE BLOOD QUANT: CPT | Performed by: EMERGENCY MEDICINE

## 2023-07-19 PROCEDURE — 82248 BILIRUBIN DIRECT: CPT | Performed by: PATHOLOGY

## 2023-07-19 PROCEDURE — 99000 SPECIMEN HANDLING OFFICE-LAB: CPT | Performed by: PATHOLOGY

## 2023-07-19 PROCEDURE — 99284 EMERGENCY DEPT VISIT MOD MDM: CPT

## 2023-07-19 PROCEDURE — 84100 ASSAY OF PHOSPHORUS: CPT | Performed by: PATHOLOGY

## 2023-07-19 PROCEDURE — 36415 COLL VENOUS BLD VENIPUNCTURE: CPT | Performed by: EMERGENCY MEDICINE

## 2023-07-19 PROCEDURE — 81001 URINALYSIS AUTO W/SCOPE: CPT | Performed by: PATHOLOGY

## 2023-07-19 PROCEDURE — 85025 COMPLETE CBC W/AUTO DIFF WBC: CPT | Performed by: PATHOLOGY

## 2023-07-19 PROCEDURE — 85027 COMPLETE CBC AUTOMATED: CPT | Performed by: EMERGENCY MEDICINE

## 2023-07-19 PROCEDURE — 93005 ELECTROCARDIOGRAM TRACING: CPT

## 2023-07-19 ASSESSMENT — ACTIVITIES OF DAILY LIVING (ADL): ADLS_ACUITY_SCORE: 35

## 2023-07-19 NOTE — ED TRIAGE NOTES
Pt with history of liver transplant and prostate cancer presents from clinic for evaluation and treatment of a K+ of 6.2.       Triage Assessment     Row Name 07/19/23 4170       Triage Assessment (Adult)    Airway WDL WDL       Respiratory WDL    Respiratory WDL WDL       Skin Circulation/Temperature WDL    Skin Circulation/Temperature WDL WDL       Cardiac WDL    Cardiac WDL WDL       Peripheral/Neurovascular WDL    Peripheral Neurovascular WDL WDL       Cognitive/Neuro/Behavioral WDL    Cognitive/Neuro/Behavioral WDL WDL

## 2023-07-19 NOTE — TELEPHONE ENCOUNTER
K 6.2.  Call to Johan to ask him to go to ER for recheck his potassium.  His glucose is also high.   Spoke to him w/ the assistance of a .  Johan will go in to be seen after some coaxing, not that willingly.

## 2023-07-19 NOTE — ED PROVIDER NOTES
ED Provider Note  [facility]      History     Chief Complaint   Patient presents with     Abnormal Labs     HPI  Loy Limon is a 68 year old male presenting for abnormal labs. Patient reports he has been feeling well generally with no acute complaints. He states his doctor ordered some labs and then today he was called and told his potassium was high and to go to the ED. He denies prior similar issues. No chest pain, light headedness, dizziness.     was used to obtain history.    Past Medical History  Past Medical History:   Diagnosis Date     Anemia      Biliary stricture of transplanted liver (H) 2018     BPH (benign prostatic hyperplasia)      Cancer (H)     hepatocellualr carcinoma     Cirrhosis of liver (H) 2018     Diabetes (H)      Enlarged prostate      Hypothyroidism      Impotence of organic origin 2020     Inguinal hernia     Repaired with mesh on 18     Liver lesion 2018     Liver transplanted (H) 2018     donor liver transplant     Portal vein thrombosis     on path explant     Postoperative atrial fibrillation (H) 2018     Prostate cancer (H)      TB lung, latent     Treated      Past Surgical History:   Procedure Laterality Date     APPENDECTOMY       COLONOSCOPY           CV CORONARY ANGIOGRAM N/A 10/13/2021    Procedure: CV CORONARY ANGIOGRAM;  Surgeon: Yaya Hampton MD;  Location:  HEART CARDI[facility] CATH LAB     CV CORONARY LITHOTRIPSY PCI N/A 10/13/2021    Procedure: CV Coronary Lithotripsy PCI;  Surgeon: Yaya Hampton MD;  Location:  HEART CARDI[facility] CATH LAB     CV INSTANTANEOUS WAVE-FREE RATIO N/A 10/13/2021    Procedure: Instantaneous Wave-Free Ratio;  Surgeon: Yaya Hampton MD;  Location:  HE[facility]ARD[facility] CATH LAB     CV INTRAVASULAR ULTRASOUND N/A 10/13/2021    Procedure: Intravascular Ultrasound;  Surgeon: Yaya Hampton  "MD;  Location:  HEART [facility]ARDI[facility] CATH LAB     CV PCI STENT DRUG ELUTING N/A 10/13/2021    Procedure: Percutaneous Coronary Intervention Stent Drug Eluting;  Surgeon: Yaya Hampton MD;  Location:  HEART CARDI[facility] CATH LAB     DAVINCI PROSTATECTOMY N/A 1/3/2020    Procedure: Robot-assisted laparoscopic radical prostatectomy, Bladder biopsy;  Surgeon: Kenrick Casiano MD;  Location: [facility]     ENDOSCOPIC RETROGRADE CHOLANGIOPANCREATOGRAM N/A 2018    Procedure: ENDOSCOPIC RETROGRADE CHOLANGIOPANCREATOGRAM, with Billary Sphincterotomy and Billary Stent Placement;  Surgeon: Omero Lawler MD;  Location: [facility]     ENDOSCOPIC RETROGRADE CHOLANGIOPANCREATOGRAM  2019    Procedure: COMBINED ENDOSCOPIC RETROGRADE CHOLANGIOPANCREATOGRAPHY, Bile Duct Stent Exchange;  Surgeon: Omero Lawler MD;  Location: [facility]     ENDOSCOPIC RETROGRADE CHOLANGIOPANCREATOGRAM N/A 2019    Procedure: ENDOSCOPIC RETROGRADE CHOLANGIOPANCREATOGRAPHY, WITH BILIARY STENT REMOVAL AND STONE EXTRACTION;  Surgeon: Omero Lawler MD;  Location: [facility]     HERNIORRHAPHY INGUINAL  2018    Procedure: Herniorrhaphy inguinal;  Surgeon: Emil Echevarria MD;  Location: [facility]     IR LIVER BIOPSY PERCUTANEOUS  2018     LAPAROTOMY EXPLORATORY      per the patient \"for infection in the LLQ\"      TRANSPLANT LIVER RECIPIENT  DONOR N/A 2018    Procedure: TRANSPLANT LIVER RECIPIENT  DONOR;  Surgeon: Emil Echevarria MD;  Location: [facility] OR     amLODIPine (NORVASC) 10 MG tablet  atorvastatin (LIPITOR) 40 MG tablet  blood glucose (NO BRAND SPECIFIED) lancets standard  blood glucose (NO BRAND SPECIFIED) test strip  blood glucose (ONE TOUCH SURESOFT) lancing device  blood glucose monitoring (ONE TOUCH ULTRA 2) meter device kit  blood glucose monitoring (ONE TOUCH ULTRASOFT) lancets  Blood Pressure Monitor KIT  dulaglutide (TRULICITY) 1.5 MG/0.5ML " "pen  guaiFENesin-dextromethorphan (ROBITUSSIN DM) 100-10 MG/5ML syrup  insulin glargine (LANTUS PEN) 100 UNIT/ML pen  insulin pen needle (31G X 5 MM) 31G X 5 MM miscellaneous  levothyroxine (SYNTHROID/LEVOTHROID) 150 MCG tablet  losartan (COZAAR) 100 MG tablet  metFORMIN (GLUCOPHAGE XR) 500 MG 24 hr tablet  mycophenolate (GENERIC EQUIVALENT) 250 MG capsule  nitroGLYcerin (NITROSTAT) 0.4 MG sublingual tablet  predniSONE (DELTASONE) 5 MG tablet  sitagliptin (JANUVIA) 50 MG tablet  ursodiol (ACTIGALL) 250 MG tablet  Vitamin D3 (CHOLECALCIFEROL) 25 mcg (1000 units) tablet      No Known Allergies  Family History  Family History   Problem Relation Age of Onset     Memory loss Mother      Liver Disease Brother      Skin Cancer No family hx of      Melanoma No family hx of      Social History   Social History     Tobacco Use     Smoking status: Former     Packs/day: 0.03     Years: 20.00     Pack years: 0.60     Types: Cigarettes, Cigars     Start date: 1970     Quit date: 3/14/2018     Years since quittin.3     Smokeless tobacco: Never     Tobacco comments:     Quit smoking 2018, one cigarette after dinner   Substance Use Topics     Alcohol use: No     Comment: Quit drinking 2018     Drug use: No         A medically appropriate review of systems was performed with pertinent positives and negatives noted in the HPI, and all other systems negative.    Physical Exam   BP: (!) 143/85  Pulse: 68  Temp: 98.1  F (36.7  C)  Resp: 16  Height: 170.2 cm (5' 7\")  Weight: 90.3 kg (199 lb)  SpO2: 96 %  Physical Exam  Constitutional:       General: He is not in acute distress.     Appearance: Normal appearance. He is not toxic-appearing.   HENT:      Head: Normocephalic and atraumatic.      Nose: Nose normal. No congestion.      Mouth/Throat:      Mouth: Mucous membranes are moist.      Pharynx: Oropharynx is clear.   Eyes:      Extraocular Movements: Extraocular movements intact.   Cardiovascular:      Rate and Rhythm: " Normal rate and regular rhythm.      Heart sounds: No murmur heard.  Pulmonary:      Effort: Pulmonary effort is normal. No respiratory distress.      Breath sounds: No wheezing or rales.   Abdominal:      General: There is no distension.      Palpations: Abdomen is soft.      Tenderness: There is no abdominal tenderness. There is no guarding.   Musculoskeletal:         General: No swelling. Normal range of motion.   Skin:     General: Skin is warm and dry.   Neurological:      General: No focal deficit present.      Mental Status: He is alert and oriented to person, place, and time.           ED Course, Procedures, & Data      Procedures       ED Course Selections:        EKG Interpretation:      Interpreted by Richard Malhotra MD  Time reviewed: 6:40  Symptoms at time of EKG: None   Rhythm: normal sinus   Rate: Normal  Axis: Normal  Ectopy: none  Conduction: normal  ST Segments/ T Waves: No acute ischemic changes and T wave inversion diffuse  Q Waves: none  Comparison to prior: Unchanged    Clinical Impression: no acute changes                           Results for orders placed or performed during the hospital encounter of 07/19/23   [place] Draw     Status: None (In process)    Narrative    The following orders were created for panel order [place] Draw.  Procedure                               Abnormality         Status                     ---------                               -----------         ------                     Extra Blue Top Tube[[id number]]                              In process                 Extra Red Top Tube[[id number]]                               In process                 Extra Green Top (Lithium...[[id number]]                                                 Extra Purple Top Tube[[id number]]                            In process                   Please view results for these tests on the individual orders.   iStat Gases Electrolytes ICA Glucose Venous, POCT     Status: Abnormal   Result  Value Ref Range    CPB Applied No     Hematocrit POCT 39 (L) 40 - 53 %    Calcium, Ionized Whole Blood POCT 5.1 4.4 - 5.2 mg/dL    Glucose Whole Blood POCT 145 (H) 70 - 99 mg/dL    Bicarbonate Venous POCT 25 21 - 28 mmol/L    Hemoglobin POCT 13.3 13.3 - 17.7 g/dL    Potassium POCT 5.0 3.4 - 5.3 mmol/L    Sodium POCT 140 133 - 144 mmol/L    pCO2 Venous POCT 48 40 - 50 mm Hg    pO2 Venous POCT 27 25 - 47 mm Hg    pH Venous POCT 7.33 7.32 - 7.43    O2 Sat, Venous POCT 44 (L) 94 - 100 %   CBC with platelets and differential     Status: None   Result Value Ref Range    WBC Count 8.1 4.0 - 11.0 10e3/uL    RBC Count 4.81 4.40 - 5.90 10e6/uL    Hemoglobin 13.7 13.3 - 17.7 g/dL    Hematocrit 43.2 40.0 - 53.0 %    MCV 90 78 - 100 fL    MCH 28.5 26.5 - 33.0 pg    MCHC 31.7 31.5 - 36.5 g/dL    RDW 12.9 10.0 - 15.0 %    Platelet Count 241 150 - 450 10e3/uL    % Neutrophils 77 %    % Lymphocytes 17 %    % Monocytes 5 %    % Eosinophils 1 %    % Basophils 0 %    % Immature Granulocytes 0 %    NRBCs per 100 WBC 0 <1 /100    Absolute Neutrophils 6.2 1.6 - 8.3 10e3/uL    Absolute Lymphocytes 1.4 0.8 - 5.3 10e3/uL    Absolute Monocytes 0.4 0.0 - 1.3 10e3/uL    Absolute Eosinophils 0.1 0.0 - 0.7 10e3/uL    Absolute Basophils 0.0 0.0 - 0.2 10e3/uL    Absolute Immature Granulocytes 0.0 <=0.4 10e3/uL    Absolute NRBCs 0.0 10e3/uL   EKG 12 lead     Status: None (Preliminary result)   Result Value Ref Range    Systolic Blood Pressure  mmHg    Diastolic Blood Pressure  mmHg    Ventricular Rate 62 BPM    Atrial Rate 62 BPM    TX Interval 152 ms    QRS Duration 94 ms     ms    QTc 426 ms    P Axis 39 degrees    R AXIS -12 degrees    T Axis 136 degrees    Interpretation ECG       Sinus rhythm  Minimal voltage criteria for LVH, may be normal variant  ST & T wave abnormality, consider anterolateral ischemia  Abnormal ECG     CBC with platelets differential     Status: None    Narrative    The following orders were created for panel order  CBC with platelets differential.  Procedure                               Abnormality         Status                     ---------                               -----------         ------                     CBC with platelets and d...[[id number]]                      Final result                 Please view results for these tests on the individual orders.   Results for orders placed or performed in visit on 07/19/23   Renal panel     Status: Abnormal   Result Value Ref Range    Sodium 141 136 - 145 mmol/L    Potassium 6.2 (HH) 3.4 - 5.3 mmol/L    Chloride 107 98 - 107 mmol/L    Carbon Dioxide (CO2) 26 22 - 29 mmol/L    Anion Gap 8 7 - 15 mmol/L    Glucose 247 (H) 70 - 99 mg/dL    Urea Nitrogen 22.5 8.0 - 23.0 mg/dL    Creatinine 1.00 0.67 - 1.17 mg/dL    GFR Estimate 81 >60 mL/min/1.73m2    Calcium 9.3 8.8 - 10.2 mg/dL    Albumin 3.8 3.5 - 5.2 g/dL    Phosphorus 3.1 2.5 - 4.5 mg/dL   Protein  random urine     Status: Abnormal   Result Value Ref Range    Total Protein Urine mg/dL 559.0 (H)   mg/dL    Total Protein UR MG/MG CR 3.83 (H) 0.00 - 0.20 mg/mg Cr    Creatinine Urine mg/dL 146.0 mg/dL   UA with Microscopic     Status: Abnormal   Result Value Ref Range    Color Urine Yellow Colorless, Straw, Light Yellow, Yellow    Appearance Urine Clear Clear    Glucose Urine 50 (A) Negative mg/dL    Bilirubin Urine Negative Negative    Ketones Urine Negative Negative mg/dL    Specific Gravity Urine 1.025 1.003 - 1.035    Blood Urine Small (A) Negative    pH Urine 6.0 5.0 - 7.0    Protein Albumin Urine 600 (A) Negative mg/dL    Urobilinogen Urine Normal Normal, 2.0 mg/dL    Nitrite Urine Negative Negative    Leukocyte Esterase Urine Negative Negative    Mucus Urine Present (A) None Seen /LPF    RBC Urine 4 (H) <=2 /HPF    WBC Urine 1 <=5 /HPF    Squamous Epithelials Urine 1 <=1 /HPF   Parathyroid Hormone Intact     Status: Abnormal   Result Value Ref Range    Parathyroid Hormone Intact 134 (H) 15 - 65 pg/mL    Narrative     This result was obtained with the Roche Elecsys PTH STAT assay.   This reference range differs from PTH assays used in other [facility] laboratories.   CBC with platelets     Status: Normal   Result Value Ref Range    WBC Count 6.9 4.0 - 11.0 10e3/uL    RBC Count 4.76 4.40 - 5.90 10e6/uL    Hemoglobin 13.9 13.3 - 17.7 g/dL    Hematocrit 42.1 40.0 - 53.0 %    MCV 88 78 - 100 fL    MCH 29.2 26.5 - 33.0 pg    MCHC 33.0 31.5 - 36.5 g/dL    RDW 12.6 10.0 - 15.0 %    Platelet Count 230 150 - 450 10e3/uL   Lipid Profile     Status: Abnormal   Result Value Ref Range    Cholesterol 153 <200 mg/dL    Triglycerides 170 (H) <150 mg/dL    Direct Measure HDL 61 >=40 mg/dL    LDL Cholesterol Calculated 58 <=100 mg/dL    Non HDL Cholesterol 92 <130 mg/dL    Narrative    Cholesterol  Desirable:  <200 mg/dL    Triglycerides  Normal:  Less than 150 mg/dL  Borderline High:  150-199 mg/dL  High:  200-499 mg/dL  Very High:  Greater than or equal to 500 mg/dL    Direct Measure HDL  Female:  Greater than or equal to 50 mg/dL   Male:  Greater than or equal to 40 mg/dL    LDL Cholesterol  Desirable:  <100mg/dL  Above Desirable:  100-129 mg/dL   Borderline High:  130-159 mg/dL   High:  160-189 mg/dL   Very High:  >= 190 mg/dL    Non HDL Cholesterol  Desirable:  130 mg/dL  Above Desirable:  130-159 mg/dL  Borderline High:  160-189 mg/dL  High:  190-219 mg/dL  Very High:  Greater than or equal to 220 mg/dL   Alkaline phosphatase     Status: Abnormal   Result Value Ref Range    Alkaline Phosphatase 150 (H) 40 - 129 U/L   ALT     Status: Normal   Result Value Ref Range    ALT 24 0 - 70 U/L   AST     Status: Normal   Result Value Ref Range    AST 20 0 - 45 U/L   Bilirubin  total     Status: Normal   Result Value Ref Range    Bilirubin Total 0.5 <=1.2 mg/dL   Bilirubin direct     Status: Normal   Result Value Ref Range    Bilirubin Direct <0.20 0.00 - 0.30 mg/dL   Protein total     Status: Normal   Result Value Ref Range    Protein Total 6.4  6.4 - 8.3 g/dL     Medications - No data to display  Labs Ordered and Resulted from Time of ED Arrival to Time of ED Departure   ISTAT GASES ELECTROLYTES ICA GLUCOSE VENOUS POCT - Abnormal       Result Value    CPB Applied No      Hematocrit POCT 39 (*)     Calcium, Ionized Whole Blood POCT 5.1      Glucose Whole Blood POCT 145 (*)     Bicarbonate Venous POCT 25      Hemoglobin POCT 13.3      Potassium POCT 5.0      Sodium POCT 140      pCO2 Venous POCT 48      pO2 Venous POCT 27      pH Venous POCT 7.33      O2 Sat, Venous POCT 44 (*)    CBC WITH PLATELETS AND DIFFERENTIAL    WBC Count 8.1      RBC Count 4.81      Hemoglobin 13.7      Hematocrit 43.2      MCV 90      MCH 28.5      MCHC 31.7      RDW 12.9      Platelet Count 241      % Neutrophils 77      % Lymphocytes 17      % Monocytes 5      % Eosinophils 1      % Basophils 0      % Immature Granulocytes 0      NRBCs per 100 WBC 0      Absolute Neutrophils 6.2      Absolute Lymphocytes 1.4      Absolute Monocytes 0.4      Absolute Eosinophils 0.1      Absolute Basophils 0.0      Absolute Immature Granulocytes 0.0      Absolute NRBCs 0.0     COMPREHENSIVE METABOLIC PANEL   MAGNESIUM     No orders to display          Critical care was not performed.     Medical Decision Making  The patient's presentation was of moderate complexity (2 or more stable chronic illnesses).    The patient's evaluation involved:  review of external note(s) from 1 sources (nephrology notes)  ordering and/or review of 3+ test(s) in this encounter (see separate area of note for details)  review of 1 test result(s) ordered prior to this encounter (outpatient labs)  discussion of management or test interpretation with another health professional (nephrology)    The patient's management necessitated only low risk treatment.      Assessment & Plan    68yoM with history of liver transplant, CKD here for abnormal labs. Outpatient labs today showed hyperkalemia. Patient was asymptomatic in the ED.  An EKG was done and was similar to his baseline, no overt signs of hyperkalemia or arrhythmia. Labs were done here and showed normal potassium. Outpatient hyperkalemia may have been an error. I spoke with nephrology, they recommended he follow up in 1 week for repeat labs, that he does not take kayexalate but should continue his normal meds. I discussed this with the patient and he was in agreement with this plan. Return precautions provided, all questions answered.    I have reviewed the nursing notes. I have reviewed the findings, diagnosis, plan and need for follow up with the patient.    New Prescriptions    No medications on file       Final diagnoses:   None       Richard Malhotra  [facility] EMERGENCY DEPARTMENT  7/19/2023     Richard Malhotra MD  07/19/23 2032

## 2023-07-19 NOTE — TELEPHONE ENCOUNTER
2 month follow up appt from 10/21/22 visit (after a few cx)  scheduled for 7/20 at 1:30 virtual with labs scheduled at [facility] on 7/19. Pt in agreement via  services. Lab orders needed.    Bhavana Aviles,  Nephrology Clinic Navigator  [facility]  Direct: [phone].

## 2023-07-19 NOTE — PROGRESS NOTES
7/19-Nai called me stating potassium is 6.2. Order Kayexalate 30 and redraw labs tomorrow. I spoke to pt and he stated that Nai had instructed him to go to the ER for evaluation. He was just pulling in as we were talking. Will follow up tomorrow and update Nai.  Kevin MOSELEY RN  Nephrology care coordinator  [facility]

## 2023-07-20 ENCOUNTER — VIRTUAL VISIT (OUTPATIENT)
Dept: NEPHROLOGY | Facility: CLINIC | Age: 68
End: 2023-07-20
Payer: COMMERCIAL

## 2023-07-20 ENCOUNTER — TELEPHONE (OUTPATIENT)
Dept: TRANSPLANT | Facility: CLINIC | Age: 68
End: 2023-07-20
Payer: COMMERCIAL

## 2023-07-20 DIAGNOSIS — Z94.4 LIVER TRANSPLANTED (H): ICD-10-CM

## 2023-07-20 DIAGNOSIS — N18.2 CKD (CHRONIC KIDNEY DISEASE) STAGE 2, GFR 60-89 ML/MIN: Primary | ICD-10-CM

## 2023-07-20 DIAGNOSIS — E11.9 DM TYPE 2, GOAL HBA1C 7%-8% (H): ICD-10-CM

## 2023-07-20 DIAGNOSIS — I10 ESSENTIAL HYPERTENSION: ICD-10-CM

## 2023-07-20 LAB
ATRIAL RATE - MUSE: 62 BPM
DIASTOLIC BLOOD PRESSURE - MUSE: NORMAL MMHG
HBA1C MFR BLD: 8.8 %
INTERPRETATION ECG - MUSE: NORMAL
P AXIS - MUSE: 39 DEGREES
PR INTERVAL - MUSE: 152 MS
QRS DURATION - MUSE: 94 MS
QT - MUSE: 420 MS
QTC - MUSE: 426 MS
R AXIS - MUSE: -12 DEGREES
SYSTOLIC BLOOD PRESSURE - MUSE: NORMAL MMHG
T AXIS - MUSE: 136 DEGREES
VENTRICULAR RATE- MUSE: 62 BPM

## 2023-07-20 PROCEDURE — 99442 PR PHYSICIAN TELEPHONE EVALUATION 11-20 MIN: CPT | Mod: 95

## 2023-07-20 RX ORDER — AMLODIPINE BESYLATE 10 MG/1
10 TABLET ORAL DAILY
Qty: 90 TABLET | Refills: 1 | Status: SHIPPED | OUTPATIENT
Start: 2023-07-20 | End: 2024-04-18

## 2023-07-20 RX ORDER — URSODIOL 250 MG/1
250 TABLET, FILM COATED ORAL 2 TIMES DAILY
Qty: 180 TABLET | Refills: 3 | Status: SHIPPED | OUTPATIENT
Start: 2023-07-20 | End: 2024-03-02

## 2023-07-20 NOTE — DISCHARGE INSTRUCTIONS
Your potassium level here was normal. This means that the labs you had done earlier may have been an error or an issue with the sample. You do not need to take the Kayexalate that was ordered. Continue taking all your regular medicines.     You should have labs drawn next week and follow up with nephrology.    If you develop any new symptoms such as chest pain, light headedness, dizziness, or have other concerns you should return to the emergency department.

## 2023-07-20 NOTE — NURSING NOTE
Is the patient currently in the state of [place]? YES    Visit mode:TELEPHONE    If the visit is dropped, the patient can be reconnected by: TELEPHONE VISIT: Phone number: [phone]    Will anyone else be joining the visit? NO      How would you like to obtain your AVS? MyChart    Are changes needed to the allergy or medication list? NO    Reason for visit: RECHECK

## 2023-07-20 NOTE — TELEPHONE ENCOUNTER
Hgb A1C was very high in ER last night.  Was in ER for hyperkalemia last evening.  Has appt w/ Gilma Guthrie on 8/8.  Please call him with the assistance of a  to remind him of this appt.  Let him know that high blood sugars can contribute to high potassiums.

## 2023-07-20 NOTE — TELEPHONE ENCOUNTER
Spoke to pt with interpretor #[id number]:    Hgb A1C was very high in ER last night.  Was in ER for hyperkalemia last evening.  Has appt bob/ Gilma Guthrie on 8/8.  Please call him with the assistance of a  to remind him of this appt.  Let him know that high blood sugars can contribute to high potassiums.    Pt states that the ER doctor informed him that there was an error with K+ levels and it is normal now.     Reminded pt that he has an appt with Dr. Guthrie. Pt will bring his glucose machine.

## 2023-07-20 NOTE — LETTER
7/20/2023       RE: Loy Limon  [address]  [facility] [address]     Dear Colleague,    Thank you for referring your patient, Loy Limon, to the [facility]PHROLOG[facility] [place] at [facility]. Please see a copy of my visit note below.    Virtual Visit Details    Type of service:  Telephone Visit   Phone call duration: 15 minutes     Nephrology Telephone Visit 7/20/23    Assessment and Plan:    1. CKD2 w/Proteinuria - Renal function has declined since our last visit in setting of poor glycemic control and elevated blood pressure despite optimal ARB dosing.    - UPCR 3.8 mg/gCr up from 2.7 g/gCr last visit. Was 3.0 g/gCr ( 3/22)     - Current evaluation for proteinuria include: SPEP/immunofix/SFLC previously WNL, PLAR neg   - DM was diagnosed post transplant, A1c today improved to 8.8 %    - Blood pressure control uncertain   - Does not use NSAIDs   - On Losartan    - Creat 1.0   - Off CNI   - Reviewed with patient the gold standard for diagnosing his proteinuria would be renal bx. Have reviewed the procedure with patient. Would not proceed until his blood pressure is optimized, his ARB is maximized and his A1c is at goal.      2. HTN- Unclear control w/o edema. No home readings. One clinic reading in ED was 143/85.   Current regimen: ( Not sure. Pt couldn't confirm what he was taking)   Amlodipine 5 mg every day   Losartan 100 mg every day    Metoprolol 75 mg bid ( not taking)    - I asked that he bring in his meds to his next visit to reconcile his list    - Asked that he purchase b/p device and begin monitoring or go to the Fire station to have his b/p checked    - Will likely need to restart Metoprolol but would switch to Toprol    3. DM2 - Uncontrolled but improved. A1c 8.8 %. Meds listed are Metformin 1000 mg bid, insulin and Trulicity but unclear if he is taking   - Random BS today was 147    4. Liver transplant IS: MMF    5.  Electrolytes - No acute concerns. K 4.7 Na 141    6. Vit D def - Vit D level 17,  ( ), Calcium 9.1 ( albumin 4.0), Phos 3.1    - Not sure if he is taking Vit D 2000 U every day but he should    7. Disposition - RTC 2 months for follow up w/labs prior      Assessment and plan was discussed with patient and he voiced his understanding and agreement.    Reason for Visit:  Proteinuria/HTN/CKD2    HPI:  Mr Limon is a 69 yo male with ETOH cirrhosis/HCC s/p Liver transplant , Prostate cancer , DM2, Proteinuria, HTN, CKD2, present today for CKD/proteinuria f/u. Last seen in clinic by me on 10/21/22    Creat 0.7 range    ROS:   Visit conducted with telephone Interpretor   He was at work and didn't have a med list or knowledge of his medications  No home b/ps. Doesn't have a device  Denies CP and dyspnea  No abdominal concerns  No voiding concerns other than frequency  Energy level and appetite are good. 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 other than his feet at the end of the day    Chronic Health Problems:    ETOH cirrhosis/HCC s/p Liver transplant 2018  Prostate cancer   DM2  Proteinuria  HTN  Immunosuppressed state  Afib with RVR  Hypothyroidism  Tobacco use d/o  Alcohol use d/o ( in remission)  TB ( latent, treated)  HLD  CKD2    Family Hx:   Family History   Problem Relation Age of Onset    Memory loss Mother     Liver Disease Brother     Skin Cancer No family hx of     Melanoma No family hx of      Personal Hx:   Social History     Tobacco Use    Smoking status: Former     Packs/day: 0.03     Years: 20.00     Pack years: 0.60     Types: Cigarettes, Cigars     Start date: 1970     Quit date: 3/14/2018     Years since quittin.3    Smokeless tobacco: Never    Tobacco comments:     Quit smoking 2018, one cigarette after dinner   Substance Use Topics    Alcohol use: No     Comment: Quit drinking 2018       Allergies:  No Known  Allergies    Medications:  Current Outpatient Medications   Medication Sig    amLODIPine (NORVASC) 10 MG tablet Take 1 tablet (10 mg) by mouth daily    ursodiol (ACTIGALL) 250 MG tablet Take 1 tablet (250 mg) by mouth 2 times daily    atorvastatin (LIPITOR) 40 MG tablet Take 1 tablet (40 mg) by mouth daily    blood glucose (NO BRAND SPECIFIED) lancets standard Use to test blood sugar 2 times daily or as directed.    blood glucose (NO BRAND SPECIFIED) test strip Use to test blood sugar 2 times daily or as directed. One touch ultra test strips    blood glucose (ONE TOUCH SURESOFT) lancing device Lancing device to be used with lancets.    blood glucose monitoring (ONE TOUCH ULTRA 2) meter device kit Use to test blood sugar 2 times daily or as directed.    blood glucose monitoring (ONE TOUCH ULTRASOFT) lancets Use to test blood sugar 2 times daily.    dulaglutide (TRULICITY) 1.5 MG/0.5ML pen Inject 1.5 mg Subcutaneous every 7 days    guaiFENesin-dextromethorphan (ROBITUSSIN DM) 100-10 MG/5ML syrup Take 10 mLs by mouth every 4 hours as needed for cough    insulin glargine (LANTUS PEN) 100 UNIT/ML pen Inject 20-30 Units Subcutaneous At Bedtime Please increase by 1 unit each week until fasting BG is 100 - 150 most days.    insulin pen needle (31G X 5 MM) 31G X 5 MM miscellaneous Use one  pen needles daily or as directed.    levothyroxine (SYNTHROID/LEVOTHROID) 150 MCG tablet Take 1 tablet (150 mcg) by mouth daily    losartan (COZAAR) 100 MG tablet Take 1 tablet (100 mg) by mouth daily    metFORMIN (GLUCOPHAGE XR) 500 MG 24 hr tablet Take 2 tablets (1,000 mg) by mouth 2 times daily (with meals)    mycophenolate (GENERIC EQUIVALENT) 250 MG capsule TAKE 3 CAPSULES(750 MG) BY MOUTH TWICE DAILY    nitroGLYcerin (NITROSTAT) 0.4 MG sublingual tablet For chest pain place 1 tablet under the tongue every 5 minutes for 3 doses. If symptoms persist 5 minutes after 1st dose call 911. (Patient not taking: Reported on 4/27/2023)     predniSONE (DELTASONE) 5 MG tablet Take 1 tablet (5 mg) by mouth daily    Vitamin D3 (CHOLECALCIFEROL) 25 mcg (1000 units) tablet Take 2 tablets (50 mcg) by mouth daily     No current facility-administered medications for this visit.      Vitals:  No home blood pressures    Exam:  No exam performed    LABS:   CMP  Recent Labs   Lab Test 07/19/23  1905 07/19/23 1749 07/19/23  1746 07/19/23  1411 06/14/23  1556 05/24/23  1412 07/21/21  1804 07/07/21  1059 04/23/21  0755 03/24/21  0812 02/01/21  0723   NA  --  140 139 141 139 138   < > 138 138 140 136   POTASSIUM 4.7 5.0 5.6* 6.2* 4.8 5.9*   < > 4.7 4.9 4.5 4.5   CHLORIDE  --   --  107 107 104 104   < > 106 108 106 104   CO2  --   --  22 26 27 26   < > 25 26 26 28   ANIONGAP  --   --  10 8 8 8   < > 6 4 7 5   GLC  --  145* 147* 247* 195* 269*   < > 202* 200* 241* 296*   BUN  --   --  22.3 22.5 21.4 26.2*   < > 26 16 24 23   CR  --   --  1.00 1.00 0.94 1.23*   < > 0.79 0.84 0.86 0.75   GFRESTIMATED  --   --  81 81 87 64   < > >90 >90 89 >90   GFRESTBLACK  --   --   --   --   --   --   --  >90 >90 >90 >90   YELENA  --   --  9.1 9.3 9.2 9.0   < > 8.3* 8.3* 8.9 8.9    < > = values in this interval not displayed.     Recent Labs   Lab Test 07/19/23 1746 07/19/23 1411 06/14/23  1556 01/27/23  1341   BILITOTAL 0.4 0.5 0.5 0.6   ALKPHOS 163* 150* 192* 166*   ALT 28 24 31 21   AST 37 20 22 20     CBC  Recent Labs   Lab Test 07/19/23 1749 07/19/23 1746 07/19/23  1411 06/14/23  1556 05/24/23  1412 03/11/23  0434   HGB 13.3 13.7 13.9 15.0   < > 14.1   WBC  --  8.1 6.9 8.2  --  8.5   RBC  --  4.81 4.76 5.20  --  4.95   HCT 39* 43.2 42.1 44.7  --  44.6   MCV  --  90 88 86  --  90   MCH  --  28.5 29.2 28.8  --  28.5   MCHC  --  31.7 33.0 33.6  --  31.6   RDW  --  12.9 12.6 12.4  --  12.2   PLT  --  241 230 230  --  185    < > = values in this interval not displayed.     URINE STUDIES  Recent Labs   Lab Test 07/19/23  1427 05/09/23  0843 03/20/23  1041 03/11/23  0449   COLOR Yellow  Light Yellow Light Yellow Light Yellow   APPEARANCE Clear Clear Clear Clear   URINEGLC 50* Negative 50* Negative   URINEBILI Negative Negative Negative Negative   URINEKETONE Negative Negative Negative Negative   SG 1.025 1.023 1.021 1.018   UBLD Small* Small* Small* Large*   URINEPH 6.0 5.5 6.0 6.5   PROTEIN 600* 300* 300* 300*   NITRITE Negative Negative Negative Negative   LEUKEST Negative Negative Negative Small*   RBCU 4* <1 5* >182*   WBCU 1 <1 1 >182*     Recent Labs   Lab Test 05/04/22  0930 03/02/22  1445 08/30/21  0943 07/21/21  1806   UTPG 2.75* 3.04* 2.83* 1.40*     PTH  Recent Labs   Lab Test 07/19/23  1411 03/02/22  1442   PTHI 134* 83*     IRON STUDIES  Recent Labs   Lab Test 07/19/18  1132   IRON 141   *   IRONSAT 68*       Jacqueline Juarez, NP

## 2023-07-22 NOTE — PROGRESS NOTES
Nephrology Telephone Visit 7/20/23    Assessment and Plan:    1. CKD2 w/Proteinuria - Renal function has declined since our last visit in setting of poor glycemic control and elevated blood pressure despite optimal ARB dosing.    - UPCR 3.8 mg/gCr up from 2.7 g/gCr last visit. Was 3.0 g/gCr ( 3/22)     - Current evaluation for proteinuria include: SPEP/immunofix/SFLC previously WNL, PLAR neg   - DM was diagnosed post transplant, A1c today improved to 8.8 %    - Blood pressure control uncertain   - Does not use NSAIDs   - On Losartan    - Creat 1.0   - Off CNI   - Reviewed with patient the gold standard for diagnosing his proteinuria would be renal bx. Have reviewed the procedure with patient. Would not proceed until his blood pressure is optimized, his ARB is maximized and his A1c is at goal.      2. HTN- Unclear control w/o edema. No home readings. One clinic reading in ED was 143/85.   Current regimen: ( Not sure. Pt couldn't confirm what he was taking)   Amlodipine 5 mg every day   Losartan 100 mg every day    Metoprolol 75 mg bid ( not taking)    - I asked that he bring in his meds to his next visit to reconcile his list    - Asked that he purchase b/p device and begin monitoring or go to the Fire station to have his b/p checked    - Will likely need to restart Metoprolol but would switch to Toprol    3. DM2 - Uncontrolled but improved. A1c 8.8 %. Meds listed are Metformin 1000 mg bid, insulin and Trulicity but unclear if he is taking   - Random BS today was 147    4. Liver transplant IS: MMF    5. Electrolytes - No acute concerns. K 4.7 Na 141    6. Vit D def - Vit D level 17,  ( 7/23), Calcium 9.1 ( albumin 4.0), Phos 3.1    - Not sure if he is taking Vit D 2000 U every day but he should    7. Disposition - RTC 2 months for follow up w/labs prior      Assessment and plan was discussed with patient and he voiced his understanding and agreement.    Reason for Visit:  Proteinuria/HTN/CKD2    HPI:  Mr Limon  is a 67 yo male with ETOH cirrhosis/HCC s/p Liver transplant 2018, Prostate cancer , DM2, Proteinuria, HTN, CKD2, present today for CKD/proteinuria f/u. Last seen in clinic by me on 10/21/22    Creat 0.7 range    ROS:   Visit conducted with telephone Interpretor   He was at work and didn't have a med list or knowledge of his medications  No home b/ps. Doesn't have a device  Denies CP and dyspnea  No abdominal concerns  No voiding concerns other than frequency  Energy level and appetite are good. 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 other than his feet at the end of the day    Chronic Health Problems:    ETOH cirrhosis/HCC s/p Liver transplant 2018  Prostate cancer   DM2  Proteinuria  HTN  Immunosuppressed state  Afib with RVR  Hypothyroidism  Tobacco use d/o  Alcohol use d/o ( in remission)  TB ( latent, treated)  HLD  CKD2    Family Hx:   Family History   Problem Relation Age of Onset     Memory loss Mother      Liver Disease Brother      Skin Cancer No family hx of      Melanoma No family hx of      Personal Hx:   Social History     Tobacco Use     Smoking status: Former     Packs/day: 0.03     Years: 20.00     Pack years: 0.60     Types: Cigarettes, Cigars     Start date: 1970     Quit date: 3/14/2018     Years since quittin.3     Smokeless tobacco: Never     Tobacco comments:     Quit smoking 2018, one cigarette after dinner   Substance Use Topics     Alcohol use: No     Comment: Quit drinking 2018       Allergies:  No Known Allergies    Medications:  Current Outpatient Medications   Medication Sig     amLODIPine (NORVASC) 10 MG tablet Take 1 tablet (10 mg) by mouth daily     ursodiol (ACTIGALL) 250 MG tablet Take 1 tablet (250 mg) by mouth 2 times daily     atorvastatin (LIPITOR) 40 MG tablet Take 1 tablet (40 mg) by mouth daily     blood glucose (NO BRAND SPECIFIED) lancets standard Use to test blood sugar 2 times daily or as directed.     blood glucose (NO  BRAND SPECIFIED) test strip Use to test blood sugar 2 times daily or as directed. One touch ultra test strips     blood glucose (ONE TOUCH SURESOFT) lancing device Lancing device to be used with lancets.     blood glucose monitoring (ONE TOUCH ULTRA 2) meter device kit Use to test blood sugar 2 times daily or as directed.     blood glucose monitoring (ONE TOUCH ULTRASOFT) lancets Use to test blood sugar 2 times daily.     dulaglutide (TRULICITY) 1.5 MG/0.5ML pen Inject 1.5 mg Subcutaneous every 7 days     guaiFENesin-dextromethorphan (ROBITUSSIN DM) 100-10 MG/5ML syrup Take 10 mLs by mouth every 4 hours as needed for cough     insulin glargine (LANTUS PEN) 100 UNIT/ML pen Inject 20-30 Units Subcutaneous At Bedtime Please increase by 1 unit each week until fasting BG is 100 - 150 most days.     insulin pen needle (31G X 5 MM) 31G X 5 MM miscellaneous Use one  pen needles daily or as directed.     levothyroxine (SYNTHROID/LEVOTHROID) 150 MCG tablet Take 1 tablet (150 mcg) by mouth daily     losartan (COZAAR) 100 MG tablet Take 1 tablet (100 mg) by mouth daily     metFORMIN (GLUCOPHAGE XR) 500 MG 24 hr tablet Take 2 tablets (1,000 mg) by mouth 2 times daily (with meals)     mycophenolate (GENERIC EQUIVALENT) 250 MG capsule TAKE 3 CAPSULES(750 MG) BY MOUTH TWICE DAILY     nitroGLYcerin (NITROSTAT) 0.4 MG sublingual tablet For chest pain place 1 tablet under the tongue every 5 minutes for 3 doses. If symptoms persist 5 minutes after 1st dose call 911. (Patient not taking: Reported on 4/27/2023)     predniSONE (DELTASONE) 5 MG tablet Take 1 tablet (5 mg) by mouth daily     Vitamin D3 (CHOLECALCIFEROL) 25 mcg (1000 units) tablet Take 2 tablets (50 mcg) by mouth daily     No current facility-administered medications for this visit.      Vitals:  No home blood pressures    Exam:  No exam performed    LABS:   [facility]  Recent Labs   Lab Test 07/19/23  1905 07/19/23  1749 07/19/23  1746 07/19/23  1411 06/14/23  1556 05/24/23  1412  07/21/21  1804 07/07/21  1059 04/23/21  0755 03/24/21  0812 02/01/21  0723   NA  --  140 139 141 139 138   < > 138 138 140 136   POTASSIUM 4.7 5.0 5.6* 6.2* 4.8 5.9*   < > 4.7 4.9 4.5 4.5   CHLORIDE  --   --  107 107 104 104   < > 106 108 106 104   CO2  --   --  22 26 27 26   < > 25 26 26 28   ANIONGAP  --   --  10 8 8 8   < > 6 4 7 5   GLC  --  145* 147* 247* 195* 269*   < > 202* 200* 241* 296*   BUN  --   --  22.3 22.5 21.4 26.2*   < > 26 16 24 23   CR  --   --  1.00 1.00 0.94 1.23*   < > 0.79 0.84 0.86 0.75   GFRESTIMATED  --   --  81 81 87 64   < > >90 >90 89 >90   GFRESTBLACK  --   --   --   --   --   --   --  >90 >90 >90 >90   YELENA  --   --  9.1 9.3 9.2 9.0   < > 8.3* 8.3* 8.9 8.9    < > = values in this interval not displayed.     Recent Labs   Lab Test 07/19/23 1746 07/19/23 1411 06/14/23  1556 01/27/23  1341   BILITOTAL 0.4 0.5 0.5 0.6   ALKPHOS 163* 150* 192* 166*   ALT 28 24 31 21   AST 37 20 22 20     CBC  Recent Labs   Lab Test 07/19/23 1749 07/19/23 1746 07/19/23  1411 06/14/23  1556 05/24/23  1412 03/11/23  0434   HGB 13.3 13.7 13.9 15.0   < > 14.1   WBC  --  8.1 6.9 8.2  --  8.5   RBC  --  4.81 4.76 5.20  --  4.95   HCT 39* 43.2 42.1 44.7  --  44.6   MCV  --  90 88 86  --  90   MCH  --  28.5 29.2 28.8  --  28.5   MCHC  --  31.7 33.0 33.6  --  31.6   RDW  --  12.9 12.6 12.4  --  12.2   PLT  --  241 230 230  --  185    < > = values in this interval not displayed.     URINE STUDIES  Recent Labs   Lab Test 07/19/23  1427 05/09/23  0843 03/20/23  1041 03/11/23  0449   COLOR Yellow Light Yellow Light Yellow Light Yellow   APPEARANCE Clear Clear Clear Clear   URINEGLC 50* Negative 50* Negative   URINEBILI Negative Negative Negative Negative   URINEKETONE Negative Negative Negative Negative   SG 1.025 1.023 1.021 1.018   UBLD Small* Small* Small* Large*   URINEPH 6.0 5.5 6.0 6.5   PROTEIN 600* 300* 300* 300*   NITRITE Negative Negative Negative Negative   LEUKEST Negative Negative Negative Small*   RBCU  4* <1 5* >182*   WBCU 1 <1 1 >182*     Recent Labs   Lab Test 05/04/22  0930 03/02/22  1445 08/30/21  0943 07/21/21  1806   UTPG 2.75* 3.04* 2.83* 1.40*     PTH  Recent Labs   Lab Test 07/19/23  1411 03/02/22  1442   PTHI 134* 83*     IRON STUDIES  Recent Labs   Lab Test 07/19/18  1132   IRON 141   *   IRONSAT 68*       Jacqueline Juarez, NP

## 2023-07-27 ENCOUNTER — OFFICE VISIT (OUTPATIENT)
Dept: UROLOGY | Facility: CLINIC | Age: 68
End: 2023-07-27
Attending: INTERNAL MEDICINE
Payer: COMMERCIAL

## 2023-07-27 VITALS
DIASTOLIC BLOOD PRESSURE: 73 MMHG | WEIGHT: 199 LBS | HEIGHT: 67 IN | BODY MASS INDEX: 31.23 KG/M2 | HEART RATE: 65 BPM | SYSTOLIC BLOOD PRESSURE: 126 MMHG

## 2023-07-27 DIAGNOSIS — R31.0 GROSS HEMATURIA: ICD-10-CM

## 2023-07-27 DIAGNOSIS — C61 PROSTATE CANCER (H): Primary | ICD-10-CM

## 2023-07-27 PROCEDURE — 99214 OFFICE O/P EST MOD 30 MIN: CPT | Performed by: PHYSICIAN ASSISTANT

## 2023-07-27 ASSESSMENT — PAIN SCALES - GENERAL: PAINLEVEL: NO PAIN (0)

## 2023-07-27 NOTE — PROGRESS NOTES
HPI: Mr. Loy Limon is a 68 year old year old male presenting today for evaluation of chief complaint(s): Follow Up    Today, he is unaccompanied     Mr. Limon has PMH significant for HTN, DM, and immunosuppression 2/2 liver transplant.  More recently he also developed prostate cancer (4/26/19 - PSA 5.51ng/dL) and is now s/p RALP with Dr. Casiano on 1/3/20, final path: [place] 3+4=7, neg margins, [id number].  Decipher was obtained and he is in HIGH risk group (0.70).     - 2/21/21 - PVR 47cc  - 4/27/21 with an undetectable PSA (4/23/21) but an elevated A1C (10.4%) UA showing microhematuria at the time. We recommended cystoscopy to evaluate the bladder   - 7/9/21 - normal cystoscopy (Dr. Leal). Also has an MR abdomen from 2/1/21 showing simple renal cortical cysts, but no pathology. Still having mild incontinence with activity (2-3 ppd).  Nocturia every 60-90 minutes.      Today he returns in routine followup.   Since that time the patient was referred back to urology for declining kidney function.      Recent urine testing shows:  7/19/23 - UA: RBC 4, protein 600, glucose 50  5/9/23 - UA: RBC <1, protein 300  3/20/23 - UA: RBC 5, protein 300  3/11/23 - UA: RBC >182, WBC >182, protein 300 --> >100K E Coli pansensitive    Serum creatinine is 1.00mg/dL on 7/19/23.  He follows with Nai Geigerx NP for CKD 2 with proteinuria and declining renal function.  Pt also has HTN and this is being further assessed.  Most recent A1C is 8.8%.      Has a CT AP with contrast from 3/11/23 showing normal kidneys and a decompressed bladder with circumferential narrowing thickening of the bladder wall and mild pericystic stranding.       1/3/2020 - RALP.  Most recent PSA on 10/21/22 was <0.01ug/L     Urinates fine.  No dysuria.  Urine is a little dark yellow.  Had gross hematuria with the UTI in March but has never had other episodes.  UTI symptoms cleared immediately with antibiotics.     Regarding emptying - he needs to push hard  to get the urine out.    Gets up 7 times at night to urine.  No daytime frequency (urinates once during a 5 hour shift). Can have urgency and urge incontinence ever since the RALP despite having undergone PFPT in the past.  Will leak a drop with coughing too.  Keeps a baby diaper inside the underwear.      Smoked cigarettes for 40 years approximately.   Grandma  cancer - unsure what type.    No other FHX malignancy  Bowels are normal.     Current Outpatient Medications   Medication Sig Dispense Refill    amLODIPine (NORVASC) 10 MG tablet Take 1 tablet (10 mg) by mouth daily 90 tablet 1    atorvastatin (LIPITOR) 40 MG tablet Take 1 tablet (40 mg) by mouth daily 90 tablet 0    blood glucose (NO BRAND SPECIFIED) lancets standard Use to test blood sugar 2 times daily or as directed. 50 lancet 3    blood glucose (NO BRAND SPECIFIED) test strip Use to test blood sugar 2 times daily or as directed. One touch ultra test strips 50 strip 3    blood glucose (ONE TOUCH SURESOFT) lancing device Lancing device to be used with lancets. 1 each 0    blood glucose monitoring (ONE TOUCH ULTRA 2) meter device kit Use to test blood sugar 2 times daily or as directed. 1 kit 0    blood glucose monitoring (ONE TOUCH ULTRASOFT) lancets Use to test blood sugar 2 times daily. 100 each 6    dulaglutide (TRULICITY) 1.5 MG/0.5ML pen Inject 1.5 mg Subcutaneous every 7 days 6 mL 1    guaiFENesin-dextromethorphan (ROBITUSSIN DM) 100-10 MG/5ML syrup Take 10 mLs by mouth every 4 hours as needed for cough 30 mL 0    insulin glargine (LANTUS PEN) 100 UNIT/ML pen Inject 20-30 Units Subcutaneous At Bedtime Please increase by 1 unit each week until fasting BG is 100 - 150 most days. 30 mL 3    insulin pen needle (31G X 5 MM) 31G X 5 MM miscellaneous Use one  pen needles daily or as directed. 100 each 3    levothyroxine (SYNTHROID/LEVOTHROID) 150 MCG tablet Take 1 tablet (150 mcg) by mouth daily 90 tablet 3    losartan (COZAAR) 100 MG tablet Take 1  "tablet (100 mg) by mouth daily 90 tablet 3    metFORMIN (GLUCOPHAGE XR) 500 MG 24 hr tablet Take 2 tablets (1,000 mg) by mouth 2 times daily (with meals) 360 tablet 3    mycophenolate (GENERIC EQUIVALENT) 250 MG capsule TAKE 3 CAPSULES(750 MG) BY MOUTH TWICE DAILY 540 capsule 3    nitroGLYcerin (NITROSTAT) 0.4 MG sublingual tablet For chest pain place 1 tablet under the tongue every 5 minutes for 3 doses. If symptoms persist 5 minutes after 1st dose call 911. (Patient not taking: Reported on 4/27/2023) 30 tablet 1    predniSONE (DELTASONE) 5 MG tablet Take 1 tablet (5 mg) by mouth daily 90 tablet 3    ursodiol (ACTIGALL) 250 MG tablet Take 1 tablet (250 mg) by mouth 2 times daily 180 tablet 3    Vitamin D3 (CHOLECALCIFEROL) 25 mcg (1000 units) tablet Take 2 tablets (50 mcg) by mouth daily 180 tablet 3       ALLERGIES: Patient has no known allergies.      REVIEW OF SYSTEMS:  As above in HPI     GENERAL PHYSICAL EXAM:   Vitals: /73   Pulse 65   Ht 1.702 m (5' 7\")   Wt 90.3 kg (199 lb)   BMI 31.17 kg/m    There is no height or weight on file to calculate BMI.    GENERAL: Well groomed, well developed, well nourished male in NAD.  NEURO: Alert and oriented x 3.  PSYCH: Normal mood and affect, pleasant and agreeable during interview and exam.     :      circumcised penis, no penile plaques or lesions. Orthotopic location of the urethral meatus.       Scrotum normal without lesions or rashes.       Testicles of normal firmness and consistency    PVR: Residual urine by ultrasound was 2 ml.         RADIOLOGY: The following tests were reviewed:   EXAM: CT ABDOMEN PELVIS W CONTRAST  LOCATION: [facility]  DATE/TIME: 3/11/2023 6:37 AM     INDICATION: hematuria  COMPARISON: None.  TECHNIQUE: CT scan of the abdomen and pelvis was performed following injection of IV contrast. Multiplanar reformats were obtained. Dose reduction techniques were used.  CONTRAST: iopamidol " (ISOVUE 370) solution 111 mL   [     FINDINGS:   LOWER CHEST: Normal.     HEPATOBILIARY: No significant mass or bile duct dilatation. No calcified gallstones. The gallbladder is surgically absent.      PANCREAS: No significant mass, duct dilatation, or inflammatory change.     SPLEEN: Normal size.     ADRENAL GLANDS: No significant nodules.     KIDNEYS/BLADDER: No significant mass, stone, or hydronephrosis. The bladder is decompressed with circumferential narrowing thickening of the bladder wall and mild pericystic stranding (image 198, series 3).      BOWEL: Stomach and duodenum are normal in size and caliber. There are scattered air-fluid levels seen scattered throughout the jejunum without significant distention. There is stool seen throughout the colon.     LYMPH NODES: No lymphadenopathy.     VASCULATURE: No abdominal aortic aneurysm.     PELVIC ORGANS: No pelvic masses.     MUSCULOSKELETAL: Unremarkable.                                                                      IMPRESSION:   1.  Mural thickening of the bladder which is at least in part exaggerated due to under distention of the bladder, with adjacent pericystic stranding, concerning for inflammatory process of the bladder such as cystitis, correlation with urinalysis is   recommended.  2.  Moderate amount of stool seen throughout the colon, correlate for constipation       LABS: The last test results for Mr. Loy Limon were reviewed:  PSA -   Lab Results   Component Value Date    PSA <0.01 10/21/2022    PSA <0.01 09/09/2021    PSA <0.01 07/21/2021    PSA <0.01 04/23/2021    PSA <0.01 03/24/2021    PSA <0.01 02/01/2021    PSA <0.01 09/25/2020    PSA <0.01 07/03/2020    PSA <0.01 03/30/2020    PSA 5.51 04/26/2019    PSA 5.02 03/19/2019    PSA 5.25 03/04/2019    PSA 3.27 08/15/2018     BMP -   Recent Labs   Lab Test 07/19/23  1905 07/19/23  1749 07/19/23  1746 07/19/23  1411 06/14/23  1556 05/24/23  1412 01/27/23  1341 12/27/22  0917  04/23/21  0755 03/24/21  0812 02/01/21  0723   NA  --  140 139 141 139 138   < > 139   < > 140 136   POTASSIUM 4.7 5.0 5.6* 6.2* 4.8 5.9*   < > 4.1   < > 4.5 4.5   CHLORIDE  --   --  107 107 104 104   < > 105   < > 106 104   CO2  --   --  22 26 27 26   < > 27   < > 26 28   BUN  --   --  22.3 22.5 21.4 26.2*   < > 17.4   < > 24 23   CR  --   --  1.00 1.00 0.94 1.23*   < > 0.85   < > 0.86 0.75   GLC  --  145* 147* 247* 195* 269*   < > 184*   < > 241* 296*   YELENA  --   --  9.1 9.3 9.2 9.0   < > 9.0   < > 8.9 8.9   MAG  --   --  2.1  --   --   --   --   --   --  1.9 2.2   PHOS  --   --   --  3.1  --  3.6  --  3.1   < >  --   --     < > = values in this interval not displayed.       CBC -   Recent Labs   Lab Test 07/19/23  1749 07/19/23  1746 07/19/23  1411 06/14/23  1556   WBC  --  8.1 6.9 8.2   HGB 13.3 13.7 13.9 15.0   PLT  --  241 230 230       ASSESSMENT:   1) prostate cancer  2) microhematuria  3) Proteinuria  4) UTI (3/2023) associated with gross hematuria - no episodes since treatment  5) post-prostatectomy urinary incontinence   6) Obstructive voiding symptoms potentially due to DM.  Had cysto in 7/2021 that ruled out a BNC.     Recommendations:  - No need to repeat UA today  - PVR today 2  - Keep your diabetes and blood pressure under good control.  Today, blood pressure is normal (126/73)  - Regarding the microscopic blood in the urine:  If you continue to have microscopic blood in your urine, we will need to repeat the cystoscopy (camera inspection of the inside of you bladder) in 1-3 years if the blood persists.  For now we don't need to do it.  (AUA GUIDELINES recommend hematuria re-evaluation every 3-5 years in the setting of persistent hematuria, with sooner re-evaluation for unexplained gross hematuria.  Pt had last eval in 7/2023.  Pt is already following with nephrology)  - If you ever see visible blood in the urine we will need to repeat the cystoscopy sooner.    - Discussed penile clamp for  incontinence - not interested.  ALso briefly discussed AUS - not interested.  Pt may not be a candidate for sling given that he already has obstructive voiding symptoms.    - Due for PSA recheck in 10/2023.  I'll place the lab order and you can do it when you return from [place] in January 2024   - Return in 1 year for re-evaluation.     VISIT duration: 37 minutes (4:46 - 5:23 on DOS)    Kerri Pascual PA-C  Department of Urologic Surgery

## 2023-07-27 NOTE — PROGRESS NOTES
Since that time the patient was referred back to urology for declining kidney function.      Recent urine testing shows:  23 - UA: RBC 4, protein 600, glucose 50  23 - UA: RBC <1, protein 300  3/20/23 - UA: RBC 5, protein 300  3/11/23 - UA: RBC >182, WBC >182, protein 300 --> >100K E Coli pansensitive    Serum creatinine is 1.00mg/dL on 23.  He follows with Nai Lopes NP for CKD 2 with proteinuria and declining renal function.  Pt also has HTN and this is being further assessed.  Most recent A1C is 8.8%.      Has a CT AP with contrast from 3/11/23 showing normal kidneys and a decompressed bladder with circumferential narrowing thickening of the bladder wall and mild pericystic stranding.       1/3/2020 - RALP.  Most recent PSA on 10/21/22 was <0.01ug/L     Urinates fine.  No dysuria.  Urine is a little dark yellow.  Had gross hematuria with the UTI in March but has never had other episodes.  UTI symptoms cleared immediately with antibiotics.     Regarding emptying - he needs to push hard to get the urine out.    Gets up 7 times at night to urine.  No daytime frequency (urinates once during a 5 hour shift). Can have urgency and urge incontinence ever since the RALP despite having undergone PFPT in the past.  Will leak a drop with coughing too.  Keeps a baby diaper inside the underwear.      Smoked cigarettes for 40 years approximately.   Grandma  cancer - unsure what type.    No other FHX malignancy      Will get a uroflow and PVR  Could have diabetic cystopathy / neurogenic bladder.        1) prostate cancer  2) microhematuria  3) UTI (3/2023)   4) post-prostatectomy urinary incontinence   5) Obstructive voiding symptoms likely due to     Recommendations:  - Regarding the microscopic blood in the urine:  If you continue to have microscopic blood in your urine, we will need to repeat the cystoscopy (camera inspection of the inside of you bladder) in 1-3 years if the blood persists.  For now we  don't need to do it.   - If you ever see visible blood in the urine we will need to repeat the cystoscopy sooner.    - Due for PSA recheck in 10/2023.  I'll place the lab order and you can do it when you return from [place].   - Return in 1 year to see how you are doing.

## 2023-07-27 NOTE — NURSING NOTE
"Chief Complaint   Patient presents with    Follow Up       Blood pressure 126/73, pulse 65, height 1.702 m (5' 7\"), weight 90.3 kg (199 lb). Body mass index is 31.17 kg/m .    Patient Active Problem List   Diagnosis    Cirrhosis of liver (H)    Liver lesion    Liver transplant recipient (H)    Immunosuppressed status (H)    Hypervolemia    Atrial fibrillation with rapid ventricular response (H)    Hematuria    Bilateral wheezing    Hypoxia    Hyperglycemia    Pre-diabetes    Essential hypertension    Constipation    Biliary stricture of transplanted liver (H)    Drug-induced diabetes mellitus (H)    Muscle tension dysphonia    Laennec's cirrhosis (H)    Elevated ferritin    Inguinal hernia of right side with obstruction and without gangrene    Right sided abdominal pain    Tobacco use disorder    Prostate cancer (H)    Diabetes mellitus, type 2 (H)    Persistent proteinuria    Chronic kidney disease, stage 2 (mild)    Hypothyroidism, unspecified type    Status post coronary angiogram    Polyneuropathy associated with underlying disease (H)    Uncontrolled type 2 diabetes mellitus with hyperglycemia (H)    Coronary artery disease of native artery of native heart with stable angina pectoris (H)       No Known Allergies    Current Outpatient Medications   Medication Sig Dispense Refill    amLODIPine (NORVASC) 10 MG tablet Take 1 tablet (10 mg) by mouth daily 90 tablet 1    atorvastatin (LIPITOR) 40 MG tablet Take 1 tablet (40 mg) by mouth daily 90 tablet 0    blood glucose (NO BRAND SPECIFIED) lancets standard Use to test blood sugar 2 times daily or as directed. 50 lancet 3    blood glucose (NO BRAND SPECIFIED) test strip Use to test blood sugar 2 times daily or as directed. One touch ultra test strips 50 strip 3    blood glucose (ONE TOUCH SURESOFT) lancing device Lancing device to be used with lancets. 1 each 0    blood glucose monitoring (ONE TOUCH ULTRA 2) meter device kit Use to test blood sugar 2 times daily or " as directed. 1 kit 0    blood glucose monitoring (ONE TOUCH ULTRASOFT) lancets Use to test blood sugar 2 times daily. 100 each 6    dulaglutide (TRULICITY) 1.5 MG/0.5ML pen Inject 1.5 mg Subcutaneous every 7 days 6 mL 1    guaiFENesin-dextromethorphan (ROBITUSSIN DM) 100-10 MG/5ML syrup Take 10 mLs by mouth every 4 hours as needed for cough 30 mL 0    insulin glargine (LANTUS PEN) 100 UNIT/ML pen Inject 20-30 Units Subcutaneous At Bedtime Please increase by 1 unit each week until fasting BG is 100 - 150 most days. 30 mL 3    insulin pen needle (31G X 5 MM) 31G X 5 MM miscellaneous Use one  pen needles daily or as directed. 100 each 3    levothyroxine (SYNTHROID/LEVOTHROID) 150 MCG tablet Take 1 tablet (150 mcg) by mouth daily 90 tablet 3    losartan (COZAAR) 100 MG tablet Take 1 tablet (100 mg) by mouth daily 90 tablet 3    metFORMIN (GLUCOPHAGE XR) 500 MG 24 hr tablet Take 2 tablets (1,000 mg) by mouth 2 times daily (with meals) 360 tablet 3    mycophenolate (GENERIC EQUIVALENT) 250 MG capsule TAKE 3 CAPSULES(750 MG) BY MOUTH TWICE DAILY 540 capsule 3    nitroGLYcerin (NITROSTAT) 0.4 MG sublingual tablet For chest pain place 1 tablet under the tongue every 5 minutes for 3 doses. If symptoms persist 5 minutes after 1st dose call 911. 30 tablet 1    predniSONE (DELTASONE) 5 MG tablet Take 1 tablet (5 mg) by mouth daily 90 tablet 3    ursodiol (ACTIGALL) 250 MG tablet Take 1 tablet (250 mg) by mouth 2 times daily 180 tablet 3    Vitamin D3 (CHOLECALCIFEROL) 25 mcg (1000 units) tablet Take 2 tablets (50 mcg) by mouth daily 180 tablet 3       Social History     Tobacco Use    Smoking status: Former     Packs/day: 0.03     Years: 20.00     Pack years: 0.60     Types: Cigarettes, Cigars     Start date: 1970     Quit date: 3/14/2018     Years since quittin.3    Smokeless tobacco: Never    Tobacco comments:     Quit smoking 2018, one cigarette after dinner   Substance Use Topics    Alcohol use: No     Comment:  Quit drinking Feb 2018    Drug use: No       Brigitte Thompson  7/27/2023  4:43 PM

## 2023-07-27 NOTE — PATIENT INSTRUCTIONS
Recommendations:  - Keep your diabetes and blood pressure under good control.  Today, blood pressure is normal (126/73)  - Continue following with nephrology  - Regarding the microscopic blood in the urine:  If you continue to have microscopic blood in your urine, we will need to repeat the cystoscopy (camera inspection of the inside of you bladder) in 1-3 years if the blood persists.  For now we don't need to do it.    - If you ever see visible blood in the urine we will need to repeat the cystoscopy sooner - return to Urology right away.      - Due for PSA recheck in 10/2023.  I'll place the lab order and you can do it when you return from [place] in January 2024   - Return in 1 year for re-evaluation.   - Nice to see you!    [facility]

## 2023-07-27 NOTE — LETTER
7/27/2023       RE: Loy Limon  [address]  [facility] [address]     Dear Colleague,    Thank you for referring your patient, Loy Limon, to the [facility] UROLOGY CLINIC [place] at [facility]. Please see a copy of my visit note below.    HPI: Mr. Loy Limon is a 68 year old year old male presenting today for evaluation of chief complaint(s): Follow Up    Today, he is unaccompanied     Mr. Limon has PMH significant for HTN, DM, and immunosuppression 2/2 liver transplant.  More recently he also developed prostate cancer (4/26/19 - PSA 5.51ng/dL) and is now s/p RALP with Dr. Casiano on 1/3/20, final path: Rebecca 3+4=7, neg margins, [id number].  Decipher was obtained and he is in HIGH risk group (0.70).     - 2/21/21 - PVR 47cc  - 4/27/21 with an undetectable PSA (4/23/21) but an elevated A1C (10.4%) UA showing microhematuria at the time. We recommended cystoscopy to evaluate the bladder   - 7/9/21 - normal cystoscopy (Dr. Leal). Also has an MR abdomen from 2/1/21 showing simple renal cortical cysts, but no pathology. Still having mild incontinence with activity (2-3 ppd).  Nocturia every 60-90 minutes.      Today he returns in routine followup.   Since that time the patient was referred back to urology for declining kidney function.      Recent urine testing shows:  7/19/23 - UA: RBC 4, protein 600, glucose 50  5/9/23 - UA: RBC <1, protein 300  3/20/23 - UA: RBC 5, protein 300  3/11/23 - UA: RBC >182, WBC >182, protein 300 --> >100K E Coli pansensitive    Serum creatinine is 1.00mg/dL on 7/19/23.  He follows with Nai Marianne NP for CKD 2 with proteinuria and declining renal function.  Pt also has HTN and this is being further assessed.  Most recent A1C is 8.8%.      Has a CT AP with contrast from 3/11/23 showing normal kidneys and a decompressed bladder with circumferential narrowing thickening of the bladder wall and mild pericystic  stranding.       1/3/2020 - RALP.  Most recent PSA on 10/21/22 was <0.01ug/L     Urinates fine.  No dysuria.  Urine is a little dark yellow.  Had gross hematuria with the UTI in March but has never had other episodes.  UTI symptoms cleared immediately with antibiotics.     Regarding emptying - he needs to push hard to get the urine out.    Gets up 7 times at night to urine.  No daytime frequency (urinates once during a 5 hour shift). Can have urgency and urge incontinence ever since the RALP despite having undergone PFPT in the past.  Will leak a drop with coughing too.  Keeps a baby diaper inside the underwear.      Smoked cigarettes for 40 years approximately.   Grandma  cancer - unsure what type.    No other FHX malignancy  Bowels are normal.     Current Outpatient Medications   Medication Sig Dispense Refill    amLODIPine (NORVASC) 10 MG tablet Take 1 tablet (10 mg) by mouth daily 90 tablet 1    atorvastatin (LIPITOR) 40 MG tablet Take 1 tablet (40 mg) by mouth daily 90 tablet 0    blood glucose (NO BRAND SPECIFIED) lancets standard Use to test blood sugar 2 times daily or as directed. 50 lancet 3    blood glucose (NO BRAND SPECIFIED) test strip Use to test blood sugar 2 times daily or as directed. One touch ultra test strips 50 strip 3    blood glucose (ONE TOUCH SURESOFT) lancing device Lancing device to be used with lancets. 1 each 0    blood glucose monitoring (ONE TOUCH ULTRA 2) meter device kit Use to test blood sugar 2 times daily or as directed. 1 kit 0    blood glucose monitoring (ONE TOUCH ULTRASOFT) lancets Use to test blood sugar 2 times daily. 100 each 6    dulaglutide (TRULICITY) 1.5 MG/0.5ML pen Inject 1.5 mg Subcutaneous every 7 days 6 mL 1    guaiFENesin-dextromethorphan (ROBITUSSIN DM) 100-10 MG/5ML syrup Take 10 mLs by mouth every 4 hours as needed for cough 30 mL 0    insulin glargine (LANTUS PEN) 100 UNIT/ML pen Inject 20-30 Units Subcutaneous At Bedtime Please increase by 1 unit  "each week until fasting BG is 100 - 150 most days. 30 mL 3    insulin pen needle (31G X 5 MM) 31G X 5 MM miscellaneous Use one  pen needles daily or as directed. 100 each 3    levothyroxine (SYNTHROID/LEVOTHROID) 150 MCG tablet Take 1 tablet (150 mcg) by mouth daily 90 tablet 3    losartan (COZAAR) 100 MG tablet Take 1 tablet (100 mg) by mouth daily 90 tablet 3    metFORMIN (GLUCOPHAGE XR) 500 MG 24 hr tablet Take 2 tablets (1,000 mg) by mouth 2 times daily (with meals) 360 tablet 3    mycophenolate (GENERIC EQUIVALENT) 250 MG capsule TAKE 3 CAPSULES(750 MG) BY MOUTH TWICE DAILY 540 capsule 3    nitroGLYcerin (NITROSTAT) 0.4 MG sublingual tablet For chest pain place 1 tablet under the tongue every 5 minutes for 3 doses. If symptoms persist 5 minutes after 1st dose call 911. (Patient not taking: Reported on 4/27/2023) 30 tablet 1    predniSONE (DELTASONE) 5 MG tablet Take 1 tablet (5 mg) by mouth daily 90 tablet 3    ursodiol (ACTIGALL) 250 MG tablet Take 1 tablet (250 mg) by mouth 2 times daily 180 tablet 3    Vitamin D3 (CHOLECALCIFEROL) 25 mcg (1000 units) tablet Take 2 tablets (50 mcg) by mouth daily 180 tablet 3       ALLERGIES: Patient has no known allergies.      REVIEW OF SYSTEMS:  As above in HPI     GENERAL PHYSICAL EXAM:   Vitals: /73   Pulse 65   Ht 1.702 m (5' 7\")   Wt 90.3 kg (199 lb)   BMI 31.17 kg/m    There is no height or weight on file to calculate BMI.    GENERAL: Well groomed, well developed, well nourished male in NAD.  NEURO: Alert and oriented x 3.  PSYCH: Normal mood and affect, pleasant and agreeable during interview and exam.     :      circumcised penis, no penile plaques or lesions. Orthotopic location of the urethral meatus.       Scrotum normal without lesions or rashes.       Testicles of normal firmness and consistency    PVR: Residual urine by ultrasound was 2 ml.         RADIOLOGY: The following tests were reviewed:   EXAM: CT ABDOMEN PELVIS W CONTRAST  LOCATION: M " [facility]  DATE/TIME: 3/11/2023 6:37 AM     INDICATION: hematuria  COMPARISON: None.  TECHNIQUE: CT scan of the abdomen and pelvis was performed following injection of IV contrast. Multiplanar reformats were obtained. Dose reduction techniques were used.  CONTRAST: iopamidol (ISOVUE 370) solution 111 mL   [     FINDINGS:   LOWER CHEST: Normal.     HEPATOBILIARY: No significant mass or bile duct dilatation. No calcified gallstones. The gallbladder is surgically absent.      PANCREAS: No significant mass, duct dilatation, or inflammatory change.     SPLEEN: Normal size.     ADRENAL GLANDS: No significant nodules.     KIDNEYS/BLADDER: No significant mass, stone, or hydronephrosis. The bladder is decompressed with circumferential narrowing thickening of the bladder wall and mild pericystic stranding (image 198, series 3).      BOWEL: Stomach and duodenum are normal in size and caliber. There are scattered air-fluid levels seen scattered throughout the jejunum without significant distention. There is stool seen throughout the colon.     LYMPH NODES: No lymphadenopathy.     VASCULATURE: No abdominal aortic aneurysm.     PELVIC ORGANS: No pelvic masses.     MUSCULOSKELETAL: Unremarkable.                                                                      IMPRESSION:   1.  Mural thickening of the bladder which is at least in part exaggerated due to under distention of the bladder, with adjacent pericystic stranding, concerning for inflammatory process of the bladder such as cystitis, correlation with urinalysis is   recommended.  2.  Moderate amount of stool seen throughout the colon, correlate for constipation       LABS: The last test results for Mr. Loy Limon were reviewed:  PSA -   Lab Results   Component Value Date    PSA <0.01 10/21/2022    PSA <0.01 09/09/2021    PSA <0.01 07/21/2021    PSA <0.01 04/23/2021    PSA <0.01 03/24/2021    PSA <0.01 02/01/2021    PSA  <0.01 09/25/2020    PSA <0.01 07/03/2020    PSA <0.01 03/30/2020    PSA 5.51 04/26/2019    PSA 5.02 03/19/2019    PSA 5.25 03/04/2019    PSA 3.27 08/15/2018     BMP -   Recent Labs   Lab Test 07/19/23  1905 07/19/23  1749 07/19/23  1746 07/19/23  1411 06/14/23  1556 05/24/23  1412 01/27/23  1341 12/27/22  0917 04/23/21  0755 03/24/21  0812 02/01/21  0723   NA  --  140 139 141 139 138   < > 139   < > 140 136   POTASSIUM 4.7 5.0 5.6* 6.2* 4.8 5.9*   < > 4.1   < > 4.5 4.5   CHLORIDE  --   --  107 107 104 104   < > 105   < > 106 104   CO2  --   --  22 26 27 26   < > 27   < > 26 28   BUN  --   --  22.3 22.5 21.4 26.2*   < > 17.4   < > 24 23   CR  --   --  1.00 1.00 0.94 1.23*   < > 0.85   < > 0.86 0.75   GLC  --  145* 147* 247* 195* 269*   < > 184*   < > 241* 296*   YELENA  --   --  9.1 9.3 9.2 9.0   < > 9.0   < > 8.9 8.9   MAG  --   --  2.1  --   --   --   --   --   --  1.9 2.2   PHOS  --   --   --  3.1  --  3.6  --  3.1   < >  --   --     < > = values in this interval not displayed.       CBC -   Recent Labs   Lab Test 07/19/23  1749 07/19/23  1746 07/19/23  1411 06/14/23  1556   WBC  --  8.1 6.9 8.2   HGB 13.3 13.7 13.9 15.0   PLT  --  241 230 230       ASSESSMENT:   1) prostate cancer  2) microhematuria  3) Proteinuria  4) UTI (3/2023) associated with gross hematuria - no episodes since treatment  5) post-prostatectomy urinary incontinence   6) Obstructive voiding symptoms potentially due to DM.  Had cysto in 7/2021 that ruled out a BNC.     Recommendations:  - No need to repeat UA today  - PVR today 2  - Keep your diabetes and blood pressure under good control.  Today, blood pressure is normal (126/73)  - Regarding the microscopic blood in the urine:  If you continue to have microscopic blood in your urine, we will need to repeat the cystoscopy (camera inspection of the inside of you bladder) in 1-3 years if the blood persists.  For now we don't need to do it.  (AUA GUIDELINES recommend hematuria re-evaluation every  3-5 years in the setting of persistent hematuria, with sooner re-evaluation for unexplained gross hematuria.  Pt had last eval in 7/2023.  Pt is already following with nephrology)  - If you ever see visible blood in the urine we will need to repeat the cystoscopy sooner.    - Discussed penile clamp for incontinence - not interested.  ALso briefly discussed AUS - not interested.  Pt may not be a candidate for sling given that he already has obstructive voiding symptoms.    - Due for PSA recheck in 10/2023.  I'll place the lab order and you can do it when you return from [place] in January 2024   - Return in 1 year for re-evaluation.     VISIT duration: 37 minutes (4:46 - 5:23 on DOS)    Kerri Pascual PA-C  Department of Urologic Surgery

## 2023-08-08 ENCOUNTER — OFFICE VISIT (OUTPATIENT)
Dept: ENDOCRINOLOGY | Facility: CLINIC | Age: 68
End: 2023-08-08
Payer: COMMERCIAL

## 2023-08-08 VITALS
HEART RATE: 67 BPM | DIASTOLIC BLOOD PRESSURE: 80 MMHG | HEIGHT: 67 IN | BODY MASS INDEX: 31.71 KG/M2 | WEIGHT: 202 LBS | OXYGEN SATURATION: 95 % | SYSTOLIC BLOOD PRESSURE: 162 MMHG

## 2023-08-08 DIAGNOSIS — E11.65 UNCONTROLLED TYPE 2 DIABETES MELLITUS WITH HYPERGLYCEMIA (H): Primary | ICD-10-CM

## 2023-08-08 PROCEDURE — 99215 OFFICE O/P EST HI 40 MIN: CPT | Performed by: PHYSICIAN ASSISTANT

## 2023-08-08 ASSESSMENT — PAIN SCALES - GENERAL: PAINLEVEL: NO PAIN (0)

## 2023-08-08 NOTE — LETTER
"8/8/2023       RE: Loy Limon  [address]  [facility] [address]     Dear Colleague,    Thank you for referring your patient, Loy Limon, to the [facility] ENDOCRINOLOGY CLINIC [place] at [facility]. Please see a copy of my visit note below.    Patient is showing 3/5 MNCM met. A1c not in range   ALEJO Roche      Date Time Reading   8/8 am 141   8/6 am 168   8/3 am 164   8/1 am 144         HPI  Loy Limon is a 68 year old male with steroid/immunosuppressive induced diabetes.  Clinic visit for diabetes follow up today.   was on the phone today.  Pt last seen by Leia Edward PA-C in 4/2023.  Pt underwent a liver transplant in Dec 2018 due to cirrhosis/hepatocellular carcinoma.  Pt also has hx of CAD s/p stent, atrial fib,HTN, BPH, hypothyroidism, prostate cancer and TB - lung latent.  For his diabetes, he is currently taking Lantus 20 units subcutaneous at hs and Metformin 1000 mg BID.  He stopped taking Trulicity-  he states \" he did not like the injection \".  Most recent A1C 8.8 % on 7/19/2023. His A1C was 9.0 % in Jan 2023.  Pt provided me with a few blood sugar readings which I scanned in his note below.  He denies sx of hypoglycemia.  On ROS today, abdominal fullness per patient.  No abd pain or diarrhea.  Intermittent blurred vision.  Denies n/v, cough, fever, chest pain, melena or blood in stool.  No dysuria or hematuria.    Diabetes Care  Retinopathy: none per pt; last seen in Oct 2022.  Nephropathy: Urine protein + in 7/2023. Seen by Nephrology staff. Pt taking Cozaar.  Neuropathy: none.  Foot Exam: no ulcers.  Taking aspirin:   Lipids: LDL 58 in 7/2023. Pt taking Lipitor.  Insulin :basal insulin once a day.  DM meds: Metformin.  Pt did NOT like Trulicity.   ADDING Jardiance today.  Testing: glucose meter.        ROS  See under HPI.    Allergies  No Known Allergies    Medications  Current Outpatient Medications "   Medication Sig Dispense Refill    amLODIPine (NORVASC) 10 MG tablet Take 1 tablet (10 mg) by mouth daily 90 tablet 1    atorvastatin (LIPITOR) 40 MG tablet Take 1 tablet (40 mg) by mouth daily 90 tablet 0    blood glucose (NO BRAND SPECIFIED) lancets standard Use to test blood sugar 2 times daily or as directed. 50 lancet 3    blood glucose (NO BRAND SPECIFIED) test strip Use to test blood sugar 2 times daily or as directed. One touch ultra test strips 50 strip 3    blood glucose (ONE TOUCH SURESOFT) lancing device Lancing device to be used with lancets. 1 each 0    blood glucose monitoring (ONE TOUCH ULTRA 2) meter device kit Use to test blood sugar 2 times daily or as directed. 1 kit 0    blood glucose monitoring (ONE TOUCH ULTRASOFT) lancets Use to test blood sugar 2 times daily. 100 each 6    empagliflozin (JARDIANCE) 10 MG TABS tablet Take 1 tablet (10 mg) by mouth daily 90 tablet 1    insulin glargine (LANTUS PEN) 100 UNIT/ML pen Inject 20-30 Units Subcutaneous At Bedtime Please increase by 1 unit each week until fasting BG is 100 - 150 most days. 30 mL 3    insulin pen needle (31G X 5 MM) 31G X 5 MM miscellaneous Use one  pen needles daily or as directed. 100 each 3    levothyroxine (SYNTHROID/LEVOTHROID) 150 MCG tablet Take 1 tablet (150 mcg) by mouth daily 90 tablet 3    losartan (COZAAR) 100 MG tablet Take 1 tablet (100 mg) by mouth daily 90 tablet 3    metFORMIN (GLUCOPHAGE XR) 500 MG 24 hr tablet Take 2 tablets (1,000 mg) by mouth 2 times daily (with meals) 360 tablet 3    mycophenolate (GENERIC EQUIVALENT) 250 MG capsule TAKE 3 CAPSULES(750 MG) BY MOUTH TWICE DAILY 540 capsule 3    nitroGLYcerin (NITROSTAT) 0.4 MG sublingual tablet For chest pain place 1 tablet under the tongue every 5 minutes for 3 doses. If symptoms persist 5 minutes after 1st dose call 911. 30 tablet 1    predniSONE (DELTASONE) 5 MG tablet Take 1 tablet (5 mg) by mouth daily 90 tablet 3    ursodiol (ACTIGALL) 250 MG tablet Take 1  tablet (250 mg) by mouth 2 times daily 180 tablet 3    Vitamin D3 (CHOLECALCIFEROL) 25 mcg (1000 units) tablet Take 2 tablets (50 mcg) by mouth daily 180 tablet 3    guaiFENesin-dextromethorphan (ROBITUSSIN DM) 100-10 MG/5ML syrup Take 10 mLs by mouth every 4 hours as needed for cough (Patient not taking: Reported on 2023) 30 mL 0       Family History  family history includes Liver Disease in his brother; Memory loss in his mother.    Social History   reports that he quit smoking about 5 years ago. His smoking use included cigarettes and cigars. He started smoking about 53 years ago. He has a 0.60 pack-year smoking history. He has never used smokeless tobacco. He reports that he does not drink alcohol and does not use drugs.     Past Medical History  Past Medical History:   Diagnosis Date    Anemia     Biliary stricture of transplanted liver (H) 2018    BPH (benign prostatic hyperplasia)     Cancer (H)     hepatocellualr carcinoma    Cirrhosis of liver (H) 2018    Diabetes (H)     Enlarged prostate     Hypothyroidism     Impotence of organic origin 2020    Inguinal hernia     Repaired with mesh on 18    Liver lesion 2018    Liver transplanted (H) 2018     donor liver transplant    Portal vein thrombosis     on path explant    Postoperative atrial fibrillation (H) 2018    Prostate cancer (H)     TB lung, latent     Treated        Past Surgical History:   Procedure Laterality Date    APPENDECTOMY      COLONOSCOPY          CV CORONARY ANGIOGRAM N/A 10/13/2021    Procedure: CV CORONARY ANGIOGRAM;  Surgeon: Yaya Hampton MD;  Location:  HEART CARDIAC CATH LAB    CV CORONARY LITHOTRIPSY PCI N/A 10/13/2021    Procedure: CV Coronary Lithotripsy PCI;  Surgeon: Yaya Hampton MD;  [facility]    CV INSTANTANEOUS WAVE-FREE RATIO N/A 10/13/2021    Procedure: Instantaneous Wave-Free Ratio;  Surgeon: Berta  "Yaya Villegas MD;  Location:  HEART CARDIAC CATH LAB    CV INTRAVASULAR ULTRASOUND N/A 10/13/2021    Procedure: Intravascular Ultrasound;  Surgeon: Yaya Hampton MD;  Location:  HEART CARDIAC CATH LAB    CV PCI STENT DRUG ELUTING N/A 10/13/2021    Procedure: Percutaneous Coronary Intervention Stent Drug Eluting;  Surgeon: Yaya Hampton MD;  Location:  HEART CARDIAC CATH LAB    DAVINCI PROSTATECTOMY N/A 1/3/2020    Procedure: Robot-assisted laparoscopic radical prostatectomy, Bladder biopsy;  Surgeon: Kenrick Casiano MD;  Location: [facility]    ENDOSCOPIC RETROGRADE CHOLANGIOPANCREATOGRAM N/A 2018    Procedure: ENDOSCOPIC RETROGRADE CHOLANGIOPANCREATOGRAM, with Billary Sphincterotomy and Billary Stent Placement;  Surgeon: Omero Lawler MD;  [facility]    ENDOSCOPIC RETROGRADE CHOLANGIOPANCREATOGRAM  2019    Procedure: COMBINED ENDOSCOPIC RETROGRADE CHOLANGIOPANCREATOGRAPHY, Bile Duct Stent Exchange;  Surgeon: Omero Lawler MD;  [facility]    ENDOSCOPIC RETROGRADE CHOLANGIOPANCREATOGRAM N/A 2019    Procedure: ENDOSCOPIC RETROGRADE CHOLANGIOPANCREATOGRAPHY, WITH BILIARY STENT REMOVAL AND STONE EXTRACTION;  Surgeon: Omero Lawler MD;  [facility]    HERNIORRHAPHY INGUINAL  2018    Procedure: Herniorrhaphy inguinal;  Surgeon: Emil Echevarria MD;  [facility]    IR LIVER BIOPSY PERCUTANEOUS  2018    LAPAROTOMY EXPLORATORY      per the patient \"for infection in the LLQ\"     TRANSPLANT LIVER RECIPIENT  DONOR N/A 2018    Procedure: TRANSPLANT LIVER RECIPIENT  DONOR;  Surgeon: Emil Echevarria MD;  [facility]: [facility]       Physical Exam  BP (!) 162/80 (BP Location: Right arm, Patient Position: Sitting, Cuff Size: Adult Regular)   Pulse 67   Ht 1.702 m (5' 7\")   Wt 91.6 kg (202 lb)   SpO2 95%   BMI 31.64 kg/m     FEET: No ulcers.    RESULTS  Creatinine   Date Value " Ref Range Status   07/19/2023 1.00 0.67 - 1.17 mg/dL Final   07/07/2021 0.79 0.66 - 1.25 mg/dL Final     GFR Estimate   Date Value Ref Range Status   07/19/2023 81 >60 mL/min/1.73m2 Final   07/07/2021 >90 >60 mL/min/[1.73_m2] Final     Comment:     Non  GFR Calc  Starting 12/18/2018, serum creatinine based estimated GFR (eGFR) will be   calculated using the Chronic Kidney Disease Epidemiology Collaboration   (CKD-EPI) equation.       Hemoglobin A1C   Date Value Ref Range Status   07/19/2023 8.8 (H) <5.7 % Final     Comment:     Normal <5.7%   Prediabetes 5.7-6.4%    Diabetes 6.5% or higher     Note: Adopted from ADA consensus guidelines.   07/07/2021 9.1 (H) 0 - 5.6 % Final     Comment:     Normal <5.7% Prediabetes 5.7-6.4%  Diabetes 6.5% or higher - adopted from ADA   consensus guidelines.       Potassium   Date Value Ref Range Status   07/19/2023 4.7 3.4 - 5.3 mmol/L Final   07/27/2022 4.5 3.4 - 5.3 mmol/L Final   07/07/2021 4.7 3.4 - 5.3 mmol/L Final     Potassium POCT   Date Value Ref Range Status   07/19/2023 5.0 3.4 - 5.3 mmol/L Final     ALT   Date Value Ref Range Status   07/19/2023 28 0 - 70 U/L Final     Comment:     Reference intervals for this test were updated on 6/12/2023 to more accurately reflect our healthy population. There may be differences in the flagging of prior results with similar values performed with this method. Interpretation of those prior results can be made in the context of the updated reference intervals.     07/07/2021 17 0 - 70 U/L Final     AST   Date Value Ref Range Status   07/19/2023 37 0 - 45 U/L Final     Comment:     Specimen is hemolyzed which can falsely elevate AST. Analysis of a non-hemolyzed specimen may result in a lower value.   Specimen is hemolyzed which can falsely elevate AST. Analysis of a non-hemolyzed specimen may result in a lower value.  Reference intervals for this test were updated on 6/12/2023 to more accurately reflect our healthy  population. There may be differences in the flagging of prior results with similar values performed with this method. Interpretation of those prior results can be made in the context of the updated reference intervals.   07/07/2021 11 0 - 45 U/L Final     TSH   Date Value Ref Range Status   06/14/2023 4.38 (H) 0.30 - 4.20 uIU/mL Final   07/27/2022 11.01 (H) 0.40 - 4.00 mU/L Final   03/24/2021 9.86 (H) 0.40 - 4.00 mU/L Final     T4 Free   Date Value Ref Range Status   03/24/2021 0.96 0.76 - 1.46 ng/dL Final     Free T4   Date Value Ref Range Status   06/14/2023 1.18 0.90 - 1.70 ng/dL Final       Cholesterol   Date Value Ref Range Status   07/19/2023 153 <200 mg/dL Final   01/25/2022 158 <200 mg/dL Final   09/25/2020 143 <200 mg/dL Final   10/23/2019 126 <200 mg/dL Final     HDL Cholesterol   Date Value Ref Range Status   09/25/2020 38 (L) >39 mg/dL Final   10/23/2019 38 (L) >39 mg/dL Final     Direct Measure HDL   Date Value Ref Range Status   07/19/2023 61 >=40 mg/dL Final   01/25/2022 57 >=40 mg/dL Final     LDL Cholesterol Calculated   Date Value Ref Range Status   07/19/2023 58 <=100 mg/dL Final   01/25/2022 74 <=100 mg/dL Final   09/25/2020 69 <100 mg/dL Final     Comment:     Desirable:       <100 mg/dl   10/23/2019 71 <100 mg/dL Final     Comment:     Desirable:       <100 mg/dl     Triglycerides   Date Value Ref Range Status   07/19/2023 170 (H) <150 mg/dL Final   01/25/2022 136 <150 mg/dL Final   09/25/2020 182 (H) <150 mg/dL Final     Comment:     Borderline high:  150-199 mg/dl  High:             200-499 mg/dl  Very high:       >499 mg/dl     10/23/2019 89 <150 mg/dL Final         ASSESSMENT/PLAN:      DIABETES: Pt's most recent A1C was 8.8 % on 7/19/2023. Increase Lantus 24 units subcutaneous at hs and continue Metformin 1000 mg BID. He tells me he did not like Trulicity and is not interested in any more injections at this time. I discussed adding Jardiance with patient today. I reviewed how Jardiance  works and possible side effects of the drug including dehydration, UTI, groin yeast infection, hypoglycemia etc.  Will start low dose Jardiance 10 mg each am and follow up with labs in 2  weeks- check creat/GFR and K+. Pt denies hx of retinopathy. Oph referral placed today.   FOLLOW UP: With me in clinic in 2 months and with Leia Edward PA-C in clinic in 4 months. He is to have creat/GFR and K+ checked in 2 weeks after starting Jardiance. Oph referral placed today. Appt with Dr. Sue Tavares in 8 months.  Time spent reviewing chart, labs and glucose data today = 8 minutes.  Time for clinic visit today = 25 minutes.  Time for documentation today = 15 minutes.    Total time for visit today = 48 minutes.    Gilma Guthrie PA-C

## 2023-08-08 NOTE — NURSING NOTE
"Chief Complaint   Patient presents with    Diabetes     Vital signs:      BP: (!) 162/80 Pulse: 67     SpO2: 95 %     Height: 170.2 cm (5' 7\") Weight: 91.6 kg (202 lb)  Estimated body mass index is 31.64 kg/m  as calculated from the following:    Height as of this encounter: 1.702 m (5' 7\").    Weight as of this encounter: 91.6 kg (202 lb).        "

## 2023-08-13 NOTE — PROGRESS NOTES
"HPI  Loy Limon is a 68 year old male with steroid/immunosuppressive induced diabetes.  Clinic visit for diabetes follow up today.   was on the phone today.  Pt last seen by Leia Edward PA-C in 4/2023.  Pt underwent a liver transplant in Dec 2018 due to cirrhosis/hepatocellular carcinoma.  Pt also has hx of CAD s/p stent, atrial fib,HTN, BPH, hypothyroidism, prostate cancer and TB - lung latent.  For his diabetes, he is currently taking Lantus 20 units subcutaneous at hs and Metformin 1000 mg BID.  He stopped taking Trulicity-  he states \" he did not like the injection \".  Most recent A1C 8.8 % on 7/19/2023. His A1C was 9.0 % in Jan 2023.  Pt provided me with a few blood sugar readings which I scanned in his note below.  He denies sx of hypoglycemia.  On ROS today, abdominal fullness per patient.  No abd pain or diarrhea.  Intermittent blurred vision.  Denies n/v, cough, fever, chest pain, melena or blood in stool.  No dysuria or hematuria.    Diabetes Care  Retinopathy: none per pt; last seen in Oct 2022.  Nephropathy: Urine protein + in 7/2023. Seen by Nephrology staff. Pt taking Cozaar.  Neuropathy: none.  Foot Exam: no ulcers.  Taking aspirin:   Lipids: LDL 58 in 7/2023. Pt taking Lipitor.  Insulin :basal insulin once a day.  DM meds: Metformin.  Pt did NOT like Trulicity.   ADDING Jardiance today.  Testing: glucose meter.        ROS  See under HPI.    Allergies  No Known Allergies    Medications  Current Outpatient Medications   Medication Sig Dispense Refill    amLODIPine (NORVASC) 10 MG tablet Take 1 tablet (10 mg) by mouth daily 90 tablet 1    atorvastatin (LIPITOR) 40 MG tablet Take 1 tablet (40 mg) by mouth daily 90 tablet 0    blood glucose (NO BRAND SPECIFIED) lancets standard Use to test blood sugar 2 times daily or as directed. 50 lancet 3    blood glucose (NO BRAND SPECIFIED) test strip Use to test blood sugar 2 times daily or as directed. One touch ultra test strips 50 strip " 3    blood glucose (ONE TOUCH SURESOFT) lancing device Lancing device to be used with lancets. 1 each 0    blood glucose monitoring (ONE TOUCH ULTRA 2) meter device kit Use to test blood sugar 2 times daily or as directed. 1 kit 0    blood glucose monitoring (ONE TOUCH ULTRASOFT) lancets Use to test blood sugar 2 times daily. 100 each 6    empagliflozin (JARDIANCE) 10 MG TABS tablet Take 1 tablet (10 mg) by mouth daily 90 tablet 1    insulin glargine (LANTUS PEN) 100 UNIT/ML pen Inject 20-30 Units Subcutaneous At Bedtime Please increase by 1 unit each week until fasting BG is 100 - 150 most days. 30 mL 3    insulin pen needle (31G X 5 MM) 31G X 5 MM miscellaneous Use one  pen needles daily or as directed. 100 each 3    levothyroxine (SYNTHROID/LEVOTHROID) 150 MCG tablet Take 1 tablet (150 mcg) by mouth daily 90 tablet 3    losartan (COZAAR) 100 MG tablet Take 1 tablet (100 mg) by mouth daily 90 tablet 3    metFORMIN (GLUCOPHAGE XR) 500 MG 24 hr tablet Take 2 tablets (1,000 mg) by mouth 2 times daily (with meals) 360 tablet 3    mycophenolate (GENERIC EQUIVALENT) 250 MG capsule TAKE 3 CAPSULES(750 MG) BY MOUTH TWICE DAILY 540 capsule 3    nitroGLYcerin (NITROSTAT) 0.4 MG sublingual tablet For chest pain place 1 tablet under the tongue every 5 minutes for 3 doses. If symptoms persist 5 minutes after 1st dose call 911. 30 tablet 1    predniSONE (DELTASONE) 5 MG tablet Take 1 tablet (5 mg) by mouth daily 90 tablet 3    ursodiol (ACTIGALL) 250 MG tablet Take 1 tablet (250 mg) by mouth 2 times daily 180 tablet 3    Vitamin D3 (CHOLECALCIFEROL) 25 mcg (1000 units) tablet Take 2 tablets (50 mcg) by mouth daily 180 tablet 3    guaiFENesin-dextromethorphan (ROBITUSSIN DM) 100-10 MG/5ML syrup Take 10 mLs by mouth every 4 hours as needed for cough (Patient not taking: Reported on 8/8/2023) 30 mL 0       Family History  family history includes Liver Disease in his brother; Memory loss in his mother.    Social History   reports  that he quit smoking about 5 years ago. His smoking use included cigarettes and cigars. He started smoking about 53 years ago. He has a 0.60 pack-year smoking history. He has never used smokeless tobacco. He reports that he does not drink alcohol and does not use drugs.     Past Medical History  Past Medical History:   Diagnosis Date    Anemia     Biliary stricture of transplanted liver (H) 2018    BPH (benign prostatic hyperplasia)     Cancer (H)     hepatocellualr carcinoma    Cirrhosis of liver (H) 2018    Diabetes (H)     Enlarged prostate     Hypothyroidism     Impotence of organic origin 2020    Inguinal hernia     Repaired with mesh on 18    Liver lesion 2018    Liver transplanted (H) 2018     donor liver transplant    Portal vein thrombosis     on path explant    Postoperative atrial fibrillation (H) 2018    Prostate cancer (H)     TB lung, latent     Treated        Past Surgical History:   Procedure Laterality Date    APPENDECTOMY      COLONOSCOPY          CV CORONARY ANGIOGRAM N/A 10/13/2021    Procedure: CV CORONARY ANGIOGRAM;  Surgeon: Yaya Hampton MD;  [facility]    CV CORONARY LITHOTRIPSY PCI N/A 10/13/2021    Procedure: CV Coronary Lithotripsy PCI;  Surgeon: Yaya Hampton MD;  [facility]    CV INSTANTANEOUS WAVE-FREE RATIO N/A 10/13/2021    Procedure: Instantaneous Wave-Free Ratio;  Surgeon: Yaya Hampton MD;  [facility]    CV INTRAVASULAR ULTRASOUND N/A 10/13/2021    Procedure: Intravascular Ultrasound;  Surgeon: Yaya Hampton MD;  Location:  [facility]    CV PCI STENT DRUG ELUTING N/A 10/13/2021    Procedure: Percutaneous Coronary Intervention Stent Drug Eluting;  Surgeon: Yaya Hampton MD;  Location:  [facility]    DAVINCI PROSTATECTOMY N/A 1/3/2020     "Procedure: Robot-assisted laparoscopic radical prostatectomy, Bladder biopsy;  Surgeon: Kenrick Casiano MD;  Location: [facility]    ENDOSCOPIC RETROGRADE CHOLANGIOPANCREATOGRAM N/A 2018    Procedure: ENDOSCOPIC RETROGRADE CHOLANGIOPANCREATOGRAM, with Billary Sphincterotomy and Billary Stent Placement;  Surgeon: Omero Lawler MD;  Location: [facility]    ENDOSCOPIC RETROGRADE CHOLANGIOPANCREATOGRAM  2019    Procedure: COMBINED ENDOSCOPIC RETROGRADE CHOLANGIOPANCREATOGRAPHY, Bile Duct Stent Exchange;  Surgeon: Omero Lawler MD;  Location: [facility]    ENDOSCOPIC RETROGRADE CHOLANGIOPANCREATOGRAM N/A 2019    Procedure: ENDOSCOPIC RETROGRADE CHOLANGIOPANCREATOGRAPHY, WITH BILIARY STENT REMOVAL AND STONE EXTRACTION;  Surgeon: Omero Lawler MD;  Location: [facility]    HERNIORRHAPHY INGUINAL  2018    Procedure: Herniorrhaphy inguinal;  Surgeon: Emil Echevarria MD;  Location: [facility]    IR LIVER BIOPSY PERCUTANEOUS  2018    LAPAROTOMY EXPLORATORY      per the patient \"for infection in the LLQ\"     TRANSPLANT LIVER RECIPIENT  DONOR N/A 2018    Procedure: TRANSPLANT LIVER RECIPIENT  DONOR;  Surgeon: Emil Echevarria MD;  Location: [facility]       Physical Exam  BP (!) 162/80 (BP Location: Right arm, Patient Position: Sitting, Cuff Size: Adult Regular)   Pulse 67   Ht 1.702 m (5' 7\")   Wt 91.6 kg (202 lb)   SpO2 95%   BMI 31.64 kg/m     FEET: No ulcers.    RESULTS  Creatinine   Date Value Ref Range Status   2023 1.00 0.67 - 1.17 mg/dL Final   2021 0.79 0.66 - 1.25 mg/dL Final     GFR Estimate   Date Value Ref Range Status   2023 81 >60 mL/min/1.73m2 Final   2021 >90 >60 mL/min/[1.73_m2] Final     Comment:     Non  GFR Calc  Starting 2018, serum creatinine based estimated GFR (eGFR) will be   calculated using the Chronic Kidney Disease Epidemiology Collaboration   (CKD-EPI) equation.       Hemoglobin " A1C   Date Value Ref Range Status   07/19/2023 8.8 (H) <5.7 % Final     Comment:     Normal <5.7%   Prediabetes 5.7-6.4%    Diabetes 6.5% or higher     Note: Adopted from ADA consensus guidelines.   07/07/2021 9.1 (H) 0 - 5.6 % Final     Comment:     Normal <5.7% Prediabetes 5.7-6.4%  Diabetes 6.5% or higher - adopted from ADA   consensus guidelines.       Potassium   Date Value Ref Range Status   07/19/2023 4.7 3.4 - 5.3 mmol/L Final   07/27/2022 4.5 3.4 - 5.3 mmol/L Final   07/07/2021 4.7 3.4 - 5.3 mmol/L Final     Potassium POCT   Date Value Ref Range Status   07/19/2023 5.0 3.4 - 5.3 mmol/L Final     ALT   Date Value Ref Range Status   07/19/2023 28 0 - 70 U/L Final     Comment:     Reference intervals for this test were updated on 6/12/2023 to more accurately reflect our healthy population. There may be differences in the flagging of prior results with similar values performed with this method. Interpretation of those prior results can be made in the context of the updated reference intervals.     07/07/2021 17 0 - 70 U/L Final     AST   Date Value Ref Range Status   07/19/2023 37 0 - 45 U/L Final     Comment:     Specimen is hemolyzed which can falsely elevate AST. Analysis of a non-hemolyzed specimen may result in a lower value.   Specimen is hemolyzed which can falsely elevate AST. Analysis of a non-hemolyzed specimen may result in a lower value.  Reference intervals for this test were updated on 6/12/2023 to more accurately reflect our healthy population. There may be differences in the flagging of prior results with similar values performed with this method. Interpretation of those prior results can be made in the context of the updated reference intervals.   07/07/2021 11 0 - 45 U/L Final     TSH   Date Value Ref Range Status   06/14/2023 4.38 (H) 0.30 - 4.20 uIU/mL Final   07/27/2022 11.01 (H) 0.40 - 4.00 mU/L Final   03/24/2021 9.86 (H) 0.40 - 4.00 mU/L Final     T4 Free   Date Value Ref Range Status    03/24/2021 0.96 0.76 - 1.46 ng/dL Final     Free T4   Date Value Ref Range Status   06/14/2023 1.18 0.90 - 1.70 ng/dL Final       Cholesterol   Date Value Ref Range Status   07/19/2023 153 <200 mg/dL Final   01/25/2022 158 <200 mg/dL Final   09/25/2020 143 <200 mg/dL Final   10/23/2019 126 <200 mg/dL Final     HDL Cholesterol   Date Value Ref Range Status   09/25/2020 38 (L) >39 mg/dL Final   10/23/2019 38 (L) >39 mg/dL Final     Direct Measure HDL   Date Value Ref Range Status   07/19/2023 61 >=40 mg/dL Final   01/25/2022 57 >=40 mg/dL Final     LDL Cholesterol Calculated   Date Value Ref Range Status   07/19/2023 58 <=100 mg/dL Final   01/25/2022 74 <=100 mg/dL Final   09/25/2020 69 <100 mg/dL Final     Comment:     Desirable:       <100 mg/dl   10/23/2019 71 <100 mg/dL Final     Comment:     Desirable:       <100 mg/dl     Triglycerides   Date Value Ref Range Status   07/19/2023 170 (H) <150 mg/dL Final   01/25/2022 136 <150 mg/dL Final   09/25/2020 182 (H) <150 mg/dL Final     Comment:     Borderline high:  150-199 mg/dl  High:             200-499 mg/dl  Very high:       >499 mg/dl     10/23/2019 89 <150 mg/dL Final         ASSESSMENT/PLAN:      DIABETES: Pt's most recent A1C was 8.8 % on 7/19/2023. Increase Lantus 24 units subcutaneous at hs and continue Metformin 1000 mg BID. He tells me he did not like Trulicity and is not interested in any more injections at this time. I discussed adding Jardiance with patient today. I reviewed how Jardiance works and possible side effects of the drug including dehydration, UTI, groin yeast infection, hypoglycemia etc.  Will start low dose Jardiance 10 mg each am and follow up with labs in 2  weeks- check creat/GFR and K+. Pt denies hx of retinopathy. Oph referral placed today.   FOLLOW UP: With me in clinic in 2 months and with Leia Edward PA-C in clinic in 4 months. He is to have creat/GFR and K+ checked in 2 weeks after starting Jardiance. Oph referral placed today.  Appt with Dr. Sue Tavares in 8 months.  Time spent reviewing chart, labs and glucose data today = 8 minutes.  Time for clinic visit today = 25 minutes.  Time for documentation today = 15 minutes.    Total time for visit today = 48 minutes.    Gilma Guthrie PA-C

## 2023-08-14 ENCOUNTER — TELEPHONE (OUTPATIENT)
Dept: ENDOCRINOLOGY | Facility: CLINIC | Age: 68
End: 2023-08-14
Payer: COMMERCIAL

## 2023-08-14 NOTE — TELEPHONE ENCOUNTER
Spoke w/ Pt's spouse. Confirms understanding of lab draw request and how to schedule.   Maya Brown, RN on 8/14/2023 at 11:36 AM       RE     Message  Received: Yesterday  Gilma Guthrie PA-C  [facility]  Please call pt and let him know I would like to recheck his creat/GFR and K+ on 8/21 after starting Jardiance.   Thanks burton guthrie

## 2023-08-18 ENCOUNTER — APPOINTMENT (OUTPATIENT)
Dept: INTERPRETER SERVICES | Facility: CLINIC | Age: 68
End: 2023-08-18
Payer: COMMERCIAL

## 2023-08-30 NOTE — TELEPHONE ENCOUNTER
"Call from Destiny (homecare) letting me know that she saw Claribel today and he is doing \"much better\".  Pain is almost gone after having 2 very large stools.  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 Amna is going to see him again tomorrow and then weekly w/ focus on medications and general teaching.  He will do labs here Monday morning.  " Ok sent in

## 2023-09-08 ENCOUNTER — LAB (OUTPATIENT)
Dept: LAB | Facility: CLINIC | Age: 68
End: 2023-09-08
Payer: COMMERCIAL

## 2023-09-08 ENCOUNTER — HOSPITAL ENCOUNTER (EMERGENCY)
Facility: CLINIC | Age: 68
Discharge: HOME | End: 2023-09-08
Attending: EMERGENCY MEDICINE | Admitting: EMERGENCY MEDICINE
Payer: COMMERCIAL

## 2023-09-08 ENCOUNTER — TELEPHONE (OUTPATIENT)
Dept: TRANSPLANT | Facility: CLINIC | Age: 68
End: 2023-09-08

## 2023-09-08 VITALS
RESPIRATION RATE: 16 BRPM | HEART RATE: 52 BPM | DIASTOLIC BLOOD PRESSURE: 84 MMHG | SYSTOLIC BLOOD PRESSURE: 165 MMHG | TEMPERATURE: 97.7 F | OXYGEN SATURATION: 99 %

## 2023-09-08 DIAGNOSIS — E87.5 HYPERKALEMIA: ICD-10-CM

## 2023-09-08 DIAGNOSIS — N18.2 CKD (CHRONIC KIDNEY DISEASE) STAGE 2, GFR 60-89 ML/MIN: ICD-10-CM

## 2023-09-08 DIAGNOSIS — E03.8 OTHER SPECIFIED HYPOTHYROIDISM: ICD-10-CM

## 2023-09-08 DIAGNOSIS — Z94.4 LIVER REPLACED BY TRANSPLANT (H): ICD-10-CM

## 2023-09-08 DIAGNOSIS — Z94.4 LIVER TRANSPLANTED (H): ICD-10-CM

## 2023-09-08 DIAGNOSIS — E11.65 UNCONTROLLED TYPE 2 DIABETES MELLITUS WITH HYPERGLYCEMIA (H): ICD-10-CM

## 2023-09-08 DIAGNOSIS — C61 PROSTATE CANCER (H): ICD-10-CM

## 2023-09-08 LAB
ALBUMIN MFR UR ELPH: 358 MG/DL
ALBUMIN SERPL BCG-MCNC: 3.9 G/DL (ref 3.5–5.2)
ALBUMIN UR-MCNC: 100 MG/DL
ALP SERPL-CCNC: 159 U/L (ref 40–129)
ALT SERPL W P-5'-P-CCNC: 23 U/L (ref 0–70)
ANION GAP SERPL CALCULATED.3IONS-SCNC: 10 MMOL/L (ref 7–15)
ANION GAP SERPL CALCULATED.3IONS-SCNC: 9 MMOL/L (ref 7–15)
APPEARANCE UR: CLEAR
AST SERPL W P-5'-P-CCNC: 21 U/L (ref 0–45)
BASOPHILS # BLD AUTO: 0 10E3/UL (ref 0–0.2)
BASOPHILS NFR BLD AUTO: 1 %
BILIRUB DIRECT SERPL-MCNC: <0.2 MG/DL (ref 0–0.3)
BILIRUB SERPL-MCNC: 0.5 MG/DL
BILIRUB UR QL STRIP: NEGATIVE
BUN SERPL-MCNC: 22.4 MG/DL (ref 8–23)
BUN SERPL-MCNC: 23.9 MG/DL (ref 8–23)
CALCIUM SERPL-MCNC: 8.8 MG/DL (ref 8.8–10.2)
CALCIUM SERPL-MCNC: 9.3 MG/DL (ref 8.8–10.2)
CHLORIDE SERPL-SCNC: 107 MMOL/L (ref 98–107)
CHLORIDE SERPL-SCNC: 108 MMOL/L (ref 98–107)
COLOR UR AUTO: ABNORMAL
CREAT SERPL-MCNC: 0.91 MG/DL (ref 0.67–1.17)
CREAT SERPL-MCNC: 0.98 MG/DL (ref 0.67–1.17)
CREAT UR-MCNC: 111 MG/DL
DEPRECATED HCO3 PLAS-SCNC: 22 MMOL/L (ref 22–29)
DEPRECATED HCO3 PLAS-SCNC: 25 MMOL/L (ref 22–29)
EGFRCR SERPLBLD CKD-EPI 2021: 83 ML/MIN/1.73M2
EGFRCR SERPLBLD CKD-EPI 2021: >90 ML/MIN/1.73M2
EOSINOPHIL # BLD AUTO: 0.1 10E3/UL (ref 0–0.7)
EOSINOPHIL NFR BLD AUTO: 2 %
ERYTHROCYTE [DISTWIDTH] IN BLOOD BY AUTOMATED COUNT: 12.5 % (ref 10–15)
ERYTHROCYTE [DISTWIDTH] IN BLOOD BY AUTOMATED COUNT: 12.7 % (ref 10–15)
GLUCOSE SERPL-MCNC: 104 MG/DL (ref 70–99)
GLUCOSE SERPL-MCNC: 217 MG/DL (ref 70–99)
GLUCOSE UR STRIP-MCNC: NEGATIVE MG/DL
HCT VFR BLD AUTO: 38.5 % (ref 40–53)
HCT VFR BLD AUTO: 41 % (ref 40–53)
HGB BLD-MCNC: 13 G/DL (ref 13.3–17.7)
HGB BLD-MCNC: 13.5 G/DL (ref 13.3–17.7)
HGB UR QL STRIP: NEGATIVE
HOLD SPECIMEN: NORMAL
HOLD SPECIMEN: NORMAL
IMM GRANULOCYTES # BLD: 0 10E3/UL
IMM GRANULOCYTES NFR BLD: 0 %
KETONES UR STRIP-MCNC: NEGATIVE MG/DL
LEUKOCYTE ESTERASE UR QL STRIP: NEGATIVE
LYMPHOCYTES # BLD AUTO: 1.6 10E3/UL (ref 0.8–5.3)
LYMPHOCYTES NFR BLD AUTO: 28 %
MCH RBC QN AUTO: 29 PG (ref 26.5–33)
MCH RBC QN AUTO: 29.3 PG (ref 26.5–33)
MCHC RBC AUTO-ENTMCNC: 32.9 G/DL (ref 31.5–36.5)
MCHC RBC AUTO-ENTMCNC: 33.8 G/DL (ref 31.5–36.5)
MCV RBC AUTO: 87 FL (ref 78–100)
MCV RBC AUTO: 88 FL (ref 78–100)
MONOCYTES # BLD AUTO: 0.4 10E3/UL (ref 0–1.3)
MONOCYTES NFR BLD AUTO: 7 %
MUCOUS THREADS #/AREA URNS LPF: PRESENT /LPF
NEUTROPHILS # BLD AUTO: 3.7 10E3/UL (ref 1.6–8.3)
NEUTROPHILS NFR BLD AUTO: 62 %
NITRATE UR QL: NEGATIVE
NRBC # BLD AUTO: 0 10E3/UL
NRBC BLD AUTO-RTO: 0 /100
PH UR STRIP: 5.5 [PH] (ref 5–7)
PHOSPHATE SERPL-MCNC: 3.4 MG/DL (ref 2.5–4.5)
PLATELET # BLD AUTO: 197 10E3/UL (ref 150–450)
PLATELET # BLD AUTO: 209 10E3/UL (ref 150–450)
POTASSIUM SERPL-SCNC: 4.7 MMOL/L (ref 3.4–5.3)
POTASSIUM SERPL-SCNC: 6 MMOL/L (ref 3.4–5.3)
PROT SERPL-MCNC: 6.4 G/DL (ref 6.4–8.3)
PROT/CREAT 24H UR: 3.23 MG/MG CR (ref 0–0.2)
PSA SERPL DL<=0.01 NG/ML-MCNC: <0.01 NG/ML (ref 0–6.5)
RBC # BLD AUTO: 4.43 10E6/UL (ref 4.4–5.9)
RBC # BLD AUTO: 4.66 10E6/UL (ref 4.4–5.9)
RBC URINE: 1 /HPF
SODIUM SERPL-SCNC: 139 MMOL/L (ref 136–145)
SODIUM SERPL-SCNC: 142 MMOL/L (ref 136–145)
SP GR UR STRIP: 1.01 (ref 1–1.03)
TSH SERPL DL<=0.005 MIU/L-ACNC: 2.17 UIU/ML (ref 0.3–4.2)
UROBILINOGEN UR STRIP-MCNC: NORMAL MG/DL
WBC # BLD AUTO: 6 10E3/UL (ref 4–11)
WBC # BLD AUTO: 6.8 10E3/UL (ref 4–11)
WBC URINE: 1 /HPF

## 2023-09-08 PROCEDURE — 84460 ALANINE AMINO (ALT) (SGPT): CPT | Performed by: PATHOLOGY

## 2023-09-08 PROCEDURE — 84075 ASSAY ALKALINE PHOSPHATASE: CPT | Performed by: PATHOLOGY

## 2023-09-08 PROCEDURE — 85025 COMPLETE CBC W/AUTO DIFF WBC: CPT | Performed by: PATHOLOGY

## 2023-09-08 PROCEDURE — 99284 EMERGENCY DEPT VISIT MOD MDM: CPT | Mod: 25

## 2023-09-08 PROCEDURE — 84155 ASSAY OF PROTEIN SERUM: CPT | Performed by: PATHOLOGY

## 2023-09-08 PROCEDURE — 93005 ELECTROCARDIOGRAM TRACING: CPT

## 2023-09-08 PROCEDURE — 96360 HYDRATION IV INFUSION INIT: CPT

## 2023-09-08 PROCEDURE — 36415 COLL VENOUS BLD VENIPUNCTURE: CPT | Performed by: EMERGENCY MEDICINE

## 2023-09-08 PROCEDURE — 84443 ASSAY THYROID STIM HORMONE: CPT | Performed by: PATHOLOGY

## 2023-09-08 PROCEDURE — 85027 COMPLETE CBC AUTOMATED: CPT | Performed by: EMERGENCY MEDICINE

## 2023-09-08 PROCEDURE — 84156 ASSAY OF PROTEIN URINE: CPT | Performed by: PATHOLOGY

## 2023-09-08 PROCEDURE — 258N000003 HC RX IP 258 OP 636: Performed by: EMERGENCY MEDICINE

## 2023-09-08 PROCEDURE — 81001 URINALYSIS AUTO W/SCOPE: CPT | Performed by: EMERGENCY MEDICINE

## 2023-09-08 PROCEDURE — 82248 BILIRUBIN DIRECT: CPT | Performed by: PHYSICIAN ASSISTANT

## 2023-09-08 PROCEDURE — 36415 COLL VENOUS BLD VENIPUNCTURE: CPT | Performed by: PATHOLOGY

## 2023-09-08 PROCEDURE — 84153 ASSAY OF PSA TOTAL: CPT | Performed by: PATHOLOGY

## 2023-09-08 PROCEDURE — 80069 RENAL FUNCTION PANEL: CPT | Performed by: PATHOLOGY

## 2023-09-08 PROCEDURE — 99284 EMERGENCY DEPT VISIT MOD MDM: CPT | Mod: 25 | Performed by: EMERGENCY MEDICINE

## 2023-09-08 PROCEDURE — 93010 ELECTROCARDIOGRAM REPORT: CPT | Performed by: EMERGENCY MEDICINE

## 2023-09-08 PROCEDURE — 84450 TRANSFERASE (AST) (SGOT): CPT | Performed by: PATHOLOGY

## 2023-09-08 PROCEDURE — 82247 BILIRUBIN TOTAL: CPT | Performed by: PATHOLOGY

## 2023-09-08 PROCEDURE — 99000 SPECIMEN HANDLING OFFICE-LAB: CPT | Performed by: PATHOLOGY

## 2023-09-08 RX ADMIN — SODIUM CHLORIDE 500 ML: 9 INJECTION, SOLUTION INTRAVENOUS at 22:10

## 2023-09-08 ASSESSMENT — ACTIVITIES OF DAILY LIVING (ADL)
ADLS_ACUITY_SCORE: 33
ADLS_ACUITY_SCORE: 35

## 2023-09-08 NOTE — TELEPHONE ENCOUNTER
Utilized  to speak with patient.  Patient states he feels totally normal.  Notified him of high potassium level.  States he had 1 very large potato and 2 bananas this morning prior to his lab draw.    Patient notified of recommendation from Dr. England to discontinue losartan use immediately.  Patient gave verbal affirmation he understood to stop utilizing the losartan.  Notified patient Dr. England sent message to Dr. Roy for recommendations of blood pressure control other than losartan.  Patient continues to emphasize that his diet is influencing big fluctuations in potassium level.  Writer offered education on foods that are high in potassium and dangers of elevated potassium for cardiac health and wellness.    Notified patient of Dr. England's recommendation for emergency department evaluation with EKG and repeat lab work.  Patient states he will go to the emergency department when his wife returns from work at 5 PM.  Denies chest pain, dizziness, shortness of breath, or feelings of irregular heartbeat.

## 2023-09-08 NOTE — ED TRIAGE NOTES
Pt was told to present to ED for high potassium (6.0). C/o mild dizziness  Denies chest pain, SOB  A&Ox4, used  line to translate for triage     Triage Assessment       Row Name 09/08/23 [id number]       Triage Assessment (Adult)    Airway WDL WDL       Respiratory WDL    Respiratory WDL WDL       Skin Circulation/Temperature WDL    Skin Circulation/Temperature WDL WDL       Cardiac WDL    Cardiac WDL WDL       Peripheral/Neurovascular WDL    Peripheral Neurovascular WDL WDL       Cognitive/Neuro/Behavioral WDL    Cognitive/Neuro/Behavioral WDL WDL

## 2023-09-09 NOTE — ED PROVIDER NOTES
ED Provider Note  [facility]      History     Chief Complaint   Patient presents with    Abnormal Labs     HPI     utilized during this interaction.     Loy Limon is a 68 year old male who has a hx of anemia, BPH, hepatocellular carcinoma s/p liver transplant in 2018, DM on insulin, who presents to the ED due to elevated potassium.  Patient had routine labs drawn today, and this demonstrated elevated potassium at 6.0.  Patient was called by a nurse this afternoon.  He reported to the nurse that he had a large potato and 2 bananas this morning prior to his lab draw.  The GI physician that he typically sees, Dr. England, was notified of these abnormal labs and patient was instructed to immediately discontinue losartan use.  Patient did verbalize understanding of these instructions.  Patient repeatedly insisted that his fluctuations in potassium are due to dietary indiscretion.    Here in the ED, the patient reports that he is feeling slightly dizzy but otherwise denies any symptoms. Denies n/v/d. No chest pain or SOB. No abdominal pain. Reports normal urination. Has urinary frequency which is typical for him. Endorses some slight L sided flank/back pain. This has been going on for about one week.     Patient is on mycophenolate and prednisone for immunosuppressants.  He is taking metformin, Jardiance for blood sugar control.    Past Medical History:   Diagnosis Date    Anemia     Biliary stricture of transplanted liver (H) 2018    BPH (benign prostatic hyperplasia)     Cancer (H)     hepatocellualr carcinoma    Cirrhosis of liver (H) 2018    Diabetes (H)     Enlarged prostate     Hypothyroidism     Impotence of organic origin 2020    Inguinal hernia     Repaired with mesh on 18    Liver lesion 2018    Liver transplanted (H) 2018     donor liver transplant    Portal vein thrombosis     on path explant    Postoperative  atrial fibrillation (H) 12/25/2018    Prostate cancer (H)     TB lung, latent     Treated        Past Surgical History:   Procedure Laterality Date    APPENDECTOMY      COLONOSCOPY      2018    CV CORONARY ANGIOGRAM N/A 10/13/2021    Procedure: CV CORONARY ANGIOGRAM;  Surgeon: Yaya Hampton MD;  Location:  HEART CARDIAC CATH LAB    CV CORONARY LITHOTRIPSY PCI N/A 10/13/2021    Procedure: CV Coronary Lithotripsy PCI;  Surgeon: Yaya Hampton MD;  Location:  HEART CARDIAC CATH LAB    CV INSTANTANEOUS WAVE-FREE RATIO N/A 10/13/2021    Procedure: Instantaneous Wave-Free Ratio;  Surgeon: Yaya Hampton MD;  Location:  HEART CARDIAC CATH LAB    CV INTRAVASULAR ULTRASOUND N/A 10/13/2021    Procedure: Intravascular Ultrasound;  Surgeon: Yaya Hampton MD;  Location:  HEART CARDI[facility] CATH LAB    CV PCI STENT DRUG ELUTING N/A 10/13/2021    Procedure: Percutaneous Coronary Intervention Stent Drug Eluting;  Surgeon: Yaya Hampton MD;  Location:  HEART CARDIAC CATH LAB    DAVINCI PROSTATECTOMY N/A 1/3/2020    Procedure: Robot-assisted laparoscopic radical prostatectomy, Bladder biopsy;  Surgeon: Kenrick Casiano MD;  [facility]    ENDOSCOPIC RETROGRADE CHOLANGIOPANCREATOGRAM N/A 12/28/2018    Procedure: ENDOSCOPIC RETROGRADE CHOLANGIOPANCREATOGRAM, with Billary Sphincterotomy and Billary Stent Placement;  Surgeon: Omero Lawler MD;  [facility]    ENDOSCOPIC RETROGRADE CHOLANGIOPANCREATOGRAM  2/18/2019    Procedure: COMBINED ENDOSCOPIC RETROGRADE CHOLANGIOPANCREATOGRAPHY, Bile Duct Stent Exchange;  Surgeon: Omero Lawler MD;  [facility]    ENDOSCOPIC RETROGRADE CHOLANGIOPANCREATOGRAM N/A 4/29/2019    Procedure: ENDOSCOPIC RETROGRADE CHOLANGIOPANCREATOGRAPHY, WITH BILIARY STENT REMOVAL AND STONE EXTRACTION;  Surgeon: Omero Lawler MD;  [facility]    HERNIORRHAPHY INGUINAL  12/22/2018  "   Procedure: Herniorrhaphy inguinal;  Surgeon: Emil Echevarria MD;  [facility]    IR LIVER BIOPSY PERCUTANEOUS  2018    LAPAROTOMY EXPLORATORY      per the patient \"for infection in the LLQ\"     TRANSPLANT LIVER RECIPIENT  DONOR N/A 2018    Procedure: TRANSPLANT LIVER RECIPIENT  DONOR;  Surgeon: Emil Echevarria MD;  [facility]       Family History   Problem Relation Age of Onset    Memory loss Mother     Liver Disease Brother     Skin Cancer No family hx of     Melanoma No family hx of        Social History     Tobacco Use    Smoking status: Former     Packs/day: 0.03     Years: 20.00     Pack years: 0.60     Types: Cigarettes, Cigars     Start date: 1970     Quit date: 3/14/2018     Years since quittin.4    Smokeless tobacco: Never    Tobacco comments:     Quit smoking 2018, one cigarette after dinner   Substance Use Topics    Alcohol use: No     Comment: Quit drinking 2018     No current facility-administered medications for this encounter.     Current Outpatient Medications   Medication    amLODIPine (NORVASC) 10 MG tablet    atorvastatin (LIPITOR) 40 MG tablet    blood glucose (NO BRAND SPECIFIED) lancets standard    blood glucose (NO BRAND SPECIFIED) test strip    blood glucose (ONE TOUCH SURESOFT) lancing device    blood glucose monitoring (ONE TOUCH ULTRA 2) meter device kit    blood glucose monitoring (ONE TOUCH ULTRASOFT) lancets    empagliflozin (JARDIANCE) 10 MG TABS tablet    guaiFENesin-dextromethorphan (ROBITUSSIN DM) 100-10 MG/5ML syrup    insulin glargine (LANTUS PEN) 100 UNIT/ML pen    insulin pen needle (31G X 5 MM) 31G X 5 MM miscellaneous    levothyroxine (SYNTHROID/LEVOTHROID) 150 MCG tablet    losartan (COZAAR) 100 MG tablet    metFORMIN (GLUCOPHAGE XR) 500 MG 24 hr tablet    mycophenolate (GENERIC EQUIVALENT) 250 MG capsule    nitroGLYcerin (NITROSTAT) 0.4 MG sublingual tablet    predniSONE (DELTASONE) 5 MG tablet    ursodiol " (ACTIGALL) 250 MG tablet    Vitamin D3 (CHOLECALCIFEROL) 25 mcg (1000 units) tablet         Past Medical History  Past Medical History:   Diagnosis Date    Anemia     Biliary stricture of transplanted liver (H) 2018    BPH (benign prostatic hyperplasia)     Cancer (H)     hepatocellualr carcinoma    Cirrhosis of liver (H) 2018    Diabetes (H)     Enlarged prostate     Hypothyroidism     Impotence of organic origin 2020    Inguinal hernia     Repaired with mesh on 18    Liver lesion 2018    Liver transplanted (H) 2018     donor liver transplant    Portal vein thrombosis     on path explant    Postoperative atrial fibrillation (H) 2018    Prostate cancer (H)     TB lung, latent     Treated      Past Surgical History:   Procedure Laterality Date    APPENDECTOMY      COLONOSCOPY          CV CORONARY ANGIOGRAM N/A 10/13/2021    Procedure: CV CORONARY ANGIOGRAM;  Surgeon: Yaya Hampton MD;  Location:  HE[facility] CARDI[facility] CATH LAB    CV CORONARY LITHOTRIPSY PCI N/A 10/13/2021    Procedure: CV Coronary Lithotripsy PCI;  Surgeon: Yaya Hampton MD;  Location:  HE[facility] CARDI[facility] CATH LAB    CV INSTANTANEOUS WAVE-FREE RATIO N/A 10/13/2021    Procedure: Instantaneous Wave-Free Ratio;  Surgeon: Yaya Hampton MD;  Location:  HE[facility] CARDI[facility] CATH LAB    CV INTRAVASULAR ULTRASOUND N/A 10/13/2021    Procedure: Intravascular Ultrasound;  Surgeon: Yaya Hampton MD;  Location:  [facility]ARD[facility] CATH LAB    CV PCI STENT DRUG ELUTING N/A 10/13/2021    Procedure: Percutaneous Coronary Intervention Stent Drug Eluting;  Surgeon: Yaya Hampton MD;  Location:  [facility]ARD[facility] CATH LAB    DAVINCI PROSTATECTOMY N/A 1/3/2020    Procedure: Robot-assisted laparoscopic radical prostatectomy, Bladder biopsy;  Surgeon: Kenrick Casiano MD;  [facility]    ENDOSCOPIC RETROGRADE CHOLANGIOPANCREATOGRAM N/A  "2018    Procedure: ENDOSCOPIC RETROGRADE CHOLANGIOPANCREATOGRAM, with Billary Sphincterotomy and Billary Stent Placement;  Surgeon: Omero Lawler MD;  [facility]    ENDOSCOPIC RETROGRADE CHOLANGIOPANCREATOGRAM  2019    Procedure: COMBINED ENDOSCOPIC RETROGRADE CHOLANGIOPANCREATOGRAPHY, Bile Duct Stent Exchange;  Surgeon: Omero Lawler MD;  [facility]    ENDOSCOPIC RETROGRADE CHOLANGIOPANCREATOGRAM N/A 2019    Procedure: ENDOSCOPIC RETROGRADE CHOLANGIOPANCREATOGRAPHY, WITH BILIARY STENT REMOVAL AND STONE EXTRACTION;  Surgeon: Omero Lawler MD;  [facility]    HERNIORRHAPHY INGUINAL  2018    Procedure: Herniorrhaphy inguinal;  Surgeon: Emil Echevarria MD;  [facility]    IR LIVER BIOPSY PERCUTANEOUS  2018    LAPAROTOMY EXPLORATORY      per the patient \"for infection in the LLQ\"     TRANSPLANT LIVER RECIPIENT  DONOR N/A 2018    Procedure: TRANSPLANT LIVER RECIPIENT  DONOR;  Surgeon: Emil Echevarria MD;  [facility]     amLODIPine (NORVASC) 10 MG tablet  atorvastatin (LIPITOR) 40 MG tablet  blood glucose (NO BRAND SPECIFIED) lancets standard  blood glucose (NO BRAND SPECIFIED) test strip  blood glucose (ONE TOUCH SURESOFT) lancing device  blood glucose monitoring (ONE TOUCH ULTRA 2) meter device kit  blood glucose monitoring (ONE TOUCH ULTRASOFT) lancets  empagliflozin (JARDIANCE) 10 MG TABS tablet  guaiFENesin-dextromethorphan (ROBITUSSIN DM) 100-10 MG/5ML syrup  insulin glargine (LANTUS PEN) 100 UNIT/ML pen  insulin pen needle (31G X 5 MM) 31G X 5 MM miscellaneous  levothyroxine (SYNTHROID/LEVOTHROID) 150 MCG tablet  losartan (COZAAR) 100 MG tablet  metFORMIN (GLUCOPHAGE XR) 500 MG 24 hr tablet  mycophenolate (GENERIC EQUIVALENT) 250 MG capsule  nitroGLYcerin (NITROSTAT) 0.4 MG sublingual tablet  predniSONE (DELTASONE) 5 MG tablet  ursodiol (ACTIGALL) 250 MG tablet  Vitamin D3 (CHOLECALCIFEROL) 25 mcg (1000 " units) tablet      No Known Allergies  Family History  Family History   Problem Relation Age of Onset    Memory loss Mother     Liver Disease Brother     Skin Cancer No family hx of     Melanoma No family hx of      Social History   Social History     Tobacco Use    Smoking status: Former     Packs/day: 0.03     Years: 20.00     Pack years: 0.60     Types: Cigarettes, Cigars     Start date: 1970     Quit date: 3/14/2018     Years since quittin.4    Smokeless tobacco: Never    Tobacco comments:     Quit smoking 2018, one cigarette after dinner   Substance Use Topics    Alcohol use: No     Comment: Quit drinking 2018    Drug use: No         A medically appropriate review of systems was performed with pertinent positives and negatives noted in the HPI, and all other systems negative.    Physical Exam   BP: (!) 155/80  Pulse: 62  Temp: 98.4  F (36.9  C)  Resp: 16  SpO2: 95 %  Physical Exam  Vitals and nursing note reviewed.   Constitutional:       General: He is not in acute distress.     Appearance: Normal appearance. He is not toxic-appearing.      Comments: Adult male, alert, cooperative, NAD   HENT:      Head: Normocephalic and atraumatic.      Mouth/Throat:      Mouth: Mucous membranes are moist.      Pharynx: Oropharynx is clear. No oropharyngeal exudate.   Eyes:      General: No scleral icterus.  Cardiovascular:      Rate and Rhythm: Normal rate.      Heart sounds: Normal heart sounds.   Pulmonary:      Effort: Pulmonary effort is normal. No respiratory distress.      Breath sounds: Normal breath sounds.   Abdominal:      Palpations: Abdomen is soft.      Tenderness: There is no abdominal tenderness. There is no right CVA tenderness or left CVA tenderness.      Comments: Obese, soft, nontender to palpation, normal bowel sounds   Musculoskeletal:         General: No deformity.      Cervical back: Neck supple.      Comments: No midline tenderness to palpation of the spine   Skin:     General: Skin  is warm.   Neurological:      Mental Status: He is alert.           ED Course, Procedures, & Data      Procedures         EKG Interpretation:      Interpreted by Dania Baker MD  Time reviewed:2130   Symptoms at time of EKG: None   Rhythm: Sinus bradycardia  Rate: 50-60  Axis: Normal  Ectopy: None  Conduction: Normal  ST Segments/ T Waves: No evidence of ST elevation or depression.  T wave inversions noted in V2, V3, V4, V5, V6, and lead I as well as aVL.  Q Waves: None  Comparison to prior: Unchanged from 7/19/23    Clinical Impression: no acute changes          Results for orders placed or performed during the hospital encounter of 09/08/23   Basic metabolic panel     Status: Abnormal   Result Value Ref Range    Sodium 139 136 - 145 mmol/L    Potassium 4.7 3.4 - 5.3 mmol/L    Chloride 108 (H) 98 - 107 mmol/L    Carbon Dioxide (CO2) 22 22 - 29 mmol/L    Anion Gap 9 7 - 15 mmol/L    Urea Nitrogen 22.4 8.0 - 23.0 mg/dL    Creatinine 0.91 0.67 - 1.17 mg/dL    Calcium 8.8 8.8 - 10.2 mg/dL    Glucose 104 (H) 70 - 99 mg/dL    GFR Estimate >90 >60 mL/min/1.73m2   UA with Microscopic reflex to Culture     Status: Abnormal    Specimen: Urine, Clean Catch   Result Value Ref Range    Color Urine Light Yellow Colorless, Straw, Light Yellow, Yellow    Appearance Urine Clear Clear    Glucose Urine Negative Negative mg/dL    Bilirubin Urine Negative Negative    Ketones Urine Negative Negative mg/dL    Specific Gravity Urine 1.012 1.003 - 1.035    Blood Urine Negative Negative    pH Urine 5.5 5.0 - 7.0    Protein Albumin Urine 100 (A) Negative mg/dL    Urobilinogen Urine Normal Normal, 2.0 mg/dL    Nitrite Urine Negative Negative    Leukocyte Esterase Urine Negative Negative    Mucus Urine Present (A) None Seen /LPF    RBC Urine 1 <=2 /HPF    WBC Urine 1 <=5 /HPF    Narrative    Urine Culture not indicated   CBC with platelets and differential     Status: Abnormal   Result Value Ref Range    WBC Count 6.0 4.0 - 11.0 10e3/uL     RBC Count 4.43 4.40 - 5.90 10e6/uL    Hemoglobin 13.0 (L) 13.3 - 17.7 g/dL    Hematocrit 38.5 (L) 40.0 - 53.0 %    MCV 87 78 - 100 fL    MCH 29.3 26.5 - 33.0 pg    MCHC 33.8 31.5 - 36.5 g/dL    RDW 12.7 10.0 - 15.0 %    Platelet Count 197 150 - 450 10e3/uL    % Neutrophils 62 %    % Lymphocytes 28 %    % Monocytes 7 %    % Eosinophils 2 %    % Basophils 1 %    % Immature Granulocytes 0 %    NRBCs per 100 WBC 0 <1 /100    Absolute Neutrophils 3.7 1.6 - 8.3 10e3/uL    Absolute Lymphocytes 1.6 0.8 - 5.3 10e3/uL    Absolute Monocytes 0.4 0.0 - 1.3 10e3/uL    Absolute Eosinophils 0.1 0.0 - 0.7 10e3/uL    Absolute Basophils 0.0 0.0 - 0.2 10e3/uL    Absolute Immature Granulocytes 0.0 <=0.4 10e3/uL    Absolute NRBCs 0.0 10e3/uL   Extra Tube     Status: None    Narrative    The following orders were created for panel order Extra Tube.  Procedure                               Abnormality         Status                     ---------                               -----------         ------                     Extra Blue Top Tube[[id number]]                              Final result               Extra Red Top Tube[[id number]]                               Final result                 Please view results for these tests on the individual orders.   Extra Blue Top Tube     Status: None   Result Value Ref Range    Hold Specimen JIC    Extra Red Top Tube     Status: None   Result Value Ref Range    Hold Specimen JIC    EKG 12 lead     Status: None (Preliminary result)   Result Value Ref Range    Systolic Blood Pressure  mmHg    Diastolic Blood Pressure  mmHg    Ventricular Rate 50 BPM    Atrial Rate 50 BPM    AL Interval 180 ms    QRS Duration 108 ms     ms    QTc 439 ms    P Axis 52 degrees    R AXIS 2 degrees    T Axis 133 degrees    Interpretation ECG       Sinus bradycardia  ST & T wave abnormality, consider anterolateral ischemia  Abnormal ECG     CBC with Platelets & Differential     Status: Abnormal    Narrative    The  following orders were created for panel order CBC with Platelets & Differential.  Procedure                               Abnormality         Status                     ---------                               -----------         ------                     CBC with platelets and d...[[id number]]  Abnormal            Final result                 Please view results for these tests on the individual orders.   Results for orders placed or performed in visit on 09/08/23   TSH with free T4 reflex     Status: Normal   Result Value Ref Range    TSH 2.17 0.30 - 4.20 uIU/mL   CBC with platelets     Status: Normal   Result Value Ref Range    WBC Count 6.8 4.0 - 11.0 10e3/uL    RBC Count 4.66 4.40 - 5.90 10e6/uL    Hemoglobin 13.5 13.3 - 17.7 g/dL    Hematocrit 41.0 40.0 - 53.0 %    MCV 88 78 - 100 fL    MCH 29.0 26.5 - 33.0 pg    MCHC 32.9 31.5 - 36.5 g/dL    RDW 12.5 10.0 - 15.0 %    Platelet Count 209 150 - 450 10e3/uL   Protein  random urine     Status: Abnormal   Result Value Ref Range    Total Protein Urine mg/dL 358.0 (H)   mg/dL    Total Protein Urine mg/mg Creat 3.23 (H) 0.00 - 0.20 mg/mg Cr    Creatinine Urine mg/dL 111.0 mg/dL   Renal panel     Status: Abnormal   Result Value Ref Range    Sodium 142 136 - 145 mmol/L    Potassium 6.0 (H) 3.4 - 5.3 mmol/L    Chloride 107 98 - 107 mmol/L    Carbon Dioxide (CO2) 25 22 - 29 mmol/L    Anion Gap 10 7 - 15 mmol/L    Glucose 217 (H) 70 - 99 mg/dL    Urea Nitrogen 23.9 (H) 8.0 - 23.0 mg/dL    Creatinine 0.98 0.67 - 1.17 mg/dL    GFR Estimate 83 >60 mL/min/1.73m2    Calcium 9.3 8.8 - 10.2 mg/dL    Albumin 3.9 3.5 - 5.2 g/dL    Phosphorus 3.4 2.5 - 4.5 mg/dL   PSA tumor marker     Status: Normal   Result Value Ref Range    PSA Tumor Marker <0.01 0.00 - 6.50 ng/mL    Narrative    This result is obtained using the Roche Elecsys total PSA method on the padmini [id number] immunoassay analyzer. Results obtained with different assay methods or kits cannot be used interchangeably.   Alkaline  phosphatase     Status: Abnormal   Result Value Ref Range    Alkaline Phosphatase 159 (H) 40 - 129 U/L   ALT     Status: Normal   Result Value Ref Range    ALT 23 0 - 70 U/L   AST     Status: Normal   Result Value Ref Range    AST 21 0 - 45 U/L   Bilirubin  total     Status: Normal   Result Value Ref Range    Bilirubin Total 0.5 <=1.2 mg/dL   Bilirubin direct     Status: Normal   Result Value Ref Range    Bilirubin Direct <0.20 0.00 - 0.30 mg/dL   Protein total     Status: Normal   Result Value Ref Range    Protein Total 6.4 6.4 - 8.3 g/dL     Medications   0.9% sodium chloride BOLUS (0 mLs Intravenous Stopped 9/8/23 3814)     Labs Ordered and Resulted from Time of ED Arrival to Time of ED Departure   BASIC METABOLIC PANEL - Abnormal       Result Value    Sodium 139      Potassium 4.7      Chloride 108 (*)     Carbon Dioxide (CO2) 22      Anion Gap 9      Urea Nitrogen 22.4      Creatinine 0.91      Calcium 8.8      Glucose 104 (*)     GFR Estimate >90     ROUTINE UA WITH MICROSCOPIC REFLEX TO CULTURE - Abnormal    Color Urine Light Yellow      Appearance Urine Clear      Glucose Urine Negative      Bilirubin Urine Negative      Ketones Urine Negative      Specific Gravity Urine 1.012      Blood Urine Negative      pH Urine 5.5      Protein Albumin Urine 100 (*)     Urobilinogen Urine Normal      Nitrite Urine Negative      Leukocyte Esterase Urine Negative      Mucus Urine Present (*)     RBC Urine 1      WBC Urine 1     CBC WITH PLATELETS AND DIFFERENTIAL - Abnormal    WBC Count 6.0      RBC Count 4.43      Hemoglobin 13.0 (*)     Hematocrit 38.5 (*)     MCV 87      MCH 29.3      MCHC 33.8      RDW 12.7      Platelet Count 197      % Neutrophils 62      % Lymphocytes 28      % Monocytes 7      % Eosinophils 2      % Basophils 1      % Immature Granulocytes 0      NRBCs per 100 WBC 0      Absolute Neutrophils 3.7      Absolute Lymphocytes 1.6      Absolute Monocytes 0.4      Absolute Eosinophils 0.1      Absolute  Basophils 0.0      Absolute Immature Granulocytes 0.0      Absolute NRBCs 0.0       No orders to display          Critical care was not performed.     Medical Decision Making  The patient's presentation was of high complexity (an acute health issue posing potential threat to life or bodily function).    The patient's evaluation involved:  review of external note(s) from 2 sources (see separate area of note for details)  review of 2 test result(s) ordered prior to this encounter (see separate area of note for details)  ordering and/or review of 3+ test(s) in this encounter (see separate area of note for details)    The patient's management necessitated only low risk treatment.    Assessment & Plan    Patient presents to the emergency department today for the above complaints.  I did review the chart including labs checked earlier today and telephone note from earlier today as well.  According to the renal panel drawn earlier today, potassium was 6.0, creatinine was actually normal at 0.98.    Etiology of hyperkalemia certainly could be due to dietary indiscretion, though with normal renal function this is a little unusual.  He is on losartan which certainly can also elevate potassium and he has already been instructed to discontinue this today.  He denies reason to be dehydrated, he is eating and drinking normally, denies nausea, vomiting, or diarrhea.    Upon arrival in the emergency department we did do an EKG which demonstrates sinus bradycardia with a rate of 50, patient does have inverted T waves noted in V2 through V6, and lead I as well as aVL.  This is consistent with prior EKGs. BMP demonstrates normal electrolytes with the exception of chloride of 108, specifically potassium is 4.7.  Glucose is 104., CBC shows hemoglobin of 13.0, normal white count and normal platelet count.  Patient was given IV fluids.    At this point we will discharge him to home.  He has been instructed via the  that  he should discontinue his losartan as has been recommended by his GI physician earlier today.  It is possible that this was contributing to his hyperkalemia.  As his hyperkalemia has now resolved, I do not believe that he requires hospitalization.  He should follow-up with his clinic on Monday.  Return to the ED for any new or worsening symptoms.  Patient verbalizes understanding.    I have reviewed the nursing notes. I have reviewed the findings, diagnosis, plan and need for follow up with the patient.    New Prescriptions    No medications on file       Final diagnoses:   Hyperkalemia - resolved       Dania Baker MD   [facility] EMERGENCY DEPARTMENT  9/8/2023     Dania Baker MD  09/08/23 [id number]

## 2023-09-09 NOTE — DISCHARGE INSTRUCTIONS
You have been seen in the ER today for a high potassium level from a lab draw from earlier today. When we checked your blood work tonight, it is normal.     Your specialist has told you to STOP the losartan (one of the blood pressure medicines). This medicine can cause the potassium to go high. Please call your clinic on Monday to check in with them and to find out if they want to replace this medicine with a different one.     Come back to the ER if you have any worsening symptoms.

## 2023-09-11 ENCOUNTER — TELEPHONE (OUTPATIENT)
Dept: TRANSPLANT | Facility: CLINIC | Age: 68
End: 2023-09-11
Payer: COMMERCIAL

## 2023-09-11 DIAGNOSIS — I10 HYPERTENSION: Primary | ICD-10-CM

## 2023-09-11 LAB
ATRIAL RATE - MUSE: 50 BPM
DIASTOLIC BLOOD PRESSURE - MUSE: NORMAL MMHG
INTERPRETATION ECG - MUSE: NORMAL
P AXIS - MUSE: 52 DEGREES
PR INTERVAL - MUSE: 180 MS
QRS DURATION - MUSE: 108 MS
QT - MUSE: 482 MS
QTC - MUSE: 439 MS
R AXIS - MUSE: 2 DEGREES
SYSTOLIC BLOOD PRESSURE - MUSE: NORMAL MMHG
T AXIS - MUSE: 133 DEGREES
VENTRICULAR RATE- MUSE: 50 BPM

## 2023-09-11 RX ORDER — HYDRALAZINE HYDROCHLORIDE 25 MG/1
25 TABLET, FILM COATED ORAL 2 TIMES DAILY
Qty: 60 TABLET | Refills: 3 | Status: SHIPPED | OUTPATIENT
Start: 2023-09-11 | End: 2023-10-13

## 2023-09-11 NOTE — TELEPHONE ENCOUNTER
Spoke to pt who states that he has stopped the losartan when he was told to. Instructed pt the need to begin hydralazine 25mg BID but to follow up with the PCP. Writer gave the pt the number to PCP and sent the script.

## 2023-09-11 NOTE — TELEPHONE ENCOUNTER
Message from Dr. Izabela Galvan:    Izabela Galvan MD sent to Jeny Szymanski RN; Jacqueline Juarez NP  I think his losartan is an issue - we told him to stop this medication. His BP was slightly high in the ED , can you have him start PO Hydralazine 25 mg bid and should see his PCP for follow up of his blood pressure          Makeschristiano Sanford knows that if the PCP wants to use something different it's fine.  He should see PCP soon.

## 2023-09-18 ENCOUNTER — TELEPHONE (OUTPATIENT)
Dept: NEPHROLOGY | Facility: CLINIC | Age: 68
End: 2023-09-18
Payer: COMMERCIAL

## 2023-09-18 ENCOUNTER — APPOINTMENT (OUTPATIENT)
Dept: INTERPRETER SERVICES | Facility: CLINIC | Age: 68
End: 2023-09-18
Payer: COMMERCIAL

## 2023-09-18 NOTE — TELEPHONE ENCOUNTER
LVM through  to schedule follow up appointment in November 2023 with Nai Lopes NP with labs prior as well as labs for a renal panel in a few days // first attempt 09.18.2023 MCE

## 2023-09-21 ENCOUNTER — TELEPHONE (OUTPATIENT)
Dept: NEPHROLOGY | Facility: CLINIC | Age: 68
End: 2023-09-21
Payer: COMMERCIAL

## 2023-09-21 NOTE — TELEPHONE ENCOUNTER
----- Message from Kandi Hussein LPN sent at 9/12/2023  4:18 PM CDT -----  Hi Frantz,    Can you please call and schedule him to see Nai in Nov with nurse visit for bp check in a week with a lab prior.   If this message did not come to you already.  Let us know if there are any questions.  Thanks  Kandi  ----- Message -----  From: Jacqueline Juarez NP  Sent: 9/11/2023   8:15 PM CDT  To: Kevin Patrick, RN, RN; #    November is fine. We'll just need to have him come in for a nurse visit for b/p check. If you guys could make those arrangements? He should have a renal panel in one week  Thanks  ----- Message -----  From: Kandi Hussein LPN  Sent: 9/11/2023   4:20 PM CDT  To: FIFI Ring    Is November ok? That is your next avail.  Thanks  Kandi    ----- Message -----  From: Amelia Lares  Sent: 9/11/2023   4:12 PM CDT  To: [facility]    Hello,    This patient was due for a 2 month follow up with Nai Juarez around 9.20.23 - her next available is not until November. Could someone provide scheduling guidance on this?    Thank you,  Amelia

## 2023-09-29 ENCOUNTER — APPOINTMENT (OUTPATIENT)
Dept: INTERPRETER SERVICES | Facility: CLINIC | Age: 68
End: 2023-09-29
Payer: COMMERCIAL

## 2023-09-29 ENCOUNTER — TELEPHONE (OUTPATIENT)
Dept: ENDOCRINOLOGY | Facility: CLINIC | Age: 68
End: 2023-09-29
Payer: COMMERCIAL

## 2023-09-29 NOTE — TELEPHONE ENCOUNTER
Spoke with patient. Pt would like a call back on 10/2/23 after 8am to give readings over the phone. Milly Pan CMA on 9/29/2023 at 1:30 PM

## 2023-09-29 NOTE — PROGRESS NOTES
Outcome for 09/29/23 1:17 PM: Reached patient but requests call back at anytime after 8am on 10/2/23  used [facility]dere[facility]    Milly Pan MA  Outcome for 10/02/23 10:37 AM: Data obtained via phone and located below used  id# [id number]  Milly Pan MA    Patient is showing 2/5 MNCM met. BP out range, A1c not in range, and Aspirin not prescribed   Milly Pan MA    Start time: 0816  End time:   Provider location: off site- home  Patient location: off site- home.    HPI:   Mr. Limon is a 68 man here with a  for follow up of steroid/immunosuppressive induced diabetes.   was on the phone today. He usually sees Leia Edward and Sue Tavares. Today is the first time I am meeting him.  He underwent a liver transplant in Dec 2018 due to cirrhosis/hepatocellular carcinoma. He also has a history of CAD s/p stent, atrial fib,HTN, BPH, hypothyroidism, prostate cancer and TB - lung latent.    Today, he reports he is doing well.  At his last visit, Shahida Guthrie added Jardiance 10 mg daily, however he never received the prescription.  No hypoglycemia.  He will be going to [place] for 3 months.  Needs refills for 3 months, leaving 19th of October.      Current treatment regimen:   Lantus 20 units at bedtime  Metformin 1000 mg BID.  Jardiance 10 mg daily- added at last visit, but he never received this.     Previous treatments:   Trulicity-  did not like the injection     Recent glucose:     9/15/23  AM: 85  PM: 160    9/18/23  AM: 86  PM: 180    9/19/23  AM: 106  PM: 188    9/20/23  AM: 100  PM: 190    9/21/23  AM: 130  PM: 180    9/22/23  AM: 98  PM: 150    9/23/23  AM: 80  PM: 110    9/24/23  AM: 100  PM: 170    9/25/23  AM: 120  PM: 200    9/26/23  AM: 90  PM: 160    9/27/23  AM: 80  PM: 180    9/28/23  AM: 80  PM: 180    9/29/23  AM: 120  PM: 145    9/30/23  AM: 82  PM: 190    10/1/23  AM:   PM: 140    10/2/23  AM: 75    Diabetes Care  Retinopathy:  none per pt; last seen in Oct 2022.  Nephropathy: Urine protein + in 2023. Seen by Nephrology staff. Pt taking Cozaar.  Neuropathy: none.  Foot Exam: no ulcers.  Taking aspirin:   Lipids: LDL 58 in 2023. Pt taking Lipitor.    Typical day:   Diet- eating well.  Better appetite.    Activity-walks every day.     Diabetes Type:   Steroid-induced Diabetes    Diabetes Control:   Lab Results   Component Value Date    A1C 8.8 2023    A1C 9.0 2023    A1C 11.1 10/21/2022    A1C 6.7 2022    A1C 10.0 2022    A1C 9.1 2021    A1C 10.4 2021    A1C 7.2 2020    A1C 7.3 2019    A1C 8.5 2019       Past Medical History:   Diagnosis Date    Anemia     Biliary stricture of transplanted liver (H) 2018    BPH (benign prostatic hyperplasia)     Cancer (H)     hepatocellualr carcinoma    Cirrhosis of liver (H) 2018    Diabetes (H)     Enlarged prostate     Hypothyroidism     Impotence of organic origin 2020    Inguinal hernia     Repaired with mesh on 18    Liver lesion 2018    Liver transplanted (H) 2018     donor liver transplant    Portal vein thrombosis     on path explant    Postoperative atrial fibrillation (H) 2018    Prostate cancer (H)     TB lung, latent     Treated        Past Surgical History:   Procedure Laterality Date    APPENDECTOMY      COLONOSCOPY          CV CORONARY ANGIOGRAM N/A 10/13/2021    Procedure: CV CORONARY ANGIOGRAM;  Surgeon: Yaya Hampton MD;  [facility]    CV CORONARY LITHOTRIPSY PCI N/A 10/13/2021    Procedure: CV Coronary Lithotripsy PCI;  Surgeon: Yaya Hampton MD;  [facility]    CV INSTANTANEOUS WAVE-FREE RATIO N/A 10/13/2021    Procedure: Instantaneous Wave-Free Ratio;  Surgeon: Yaya Hampton MD;  [facility]    CV INTRAVASULAR ULTRASOUND N/A 10/13/2021    Procedure:  "Intravascular Ultrasound;  Surgeon: Yaya Hampton MD;  [facility]    CV PCI STENT DRUG ELUTING N/A 10/13/2021    Procedure: Percutaneous Coronary Intervention Stent Drug Eluting;  Surgeon: Yaya Hampton MD;  Location:  [facility]    DAVINCI PROSTATECTOMY N/A 1/3/2020    Procedure: Robot-assisted laparoscopic radical prostatectomy, Bladder biopsy;  Surgeon: Kenrick Casiano MD;  [facility]    ENDOSCOPIC RETROGRADE CHOLANGIOPANCREATOGRAM N/A 2018    Procedure: ENDOSCOPIC RETROGRADE CHOLANGIOPANCREATOGRAM, with Billary Sphincterotomy and Billary Stent Placement;  Surgeon: Omero Lawler MD;  [facility]    ENDOSCOPIC RETROGRADE CHOLANGIOPANCREATOGRAM  2019    Procedure: COMBINED ENDOSCOPIC RETROGRADE CHOLANGIOPANCREATOGRAPHY, Bile Duct Stent Exchange;  Surgeon: Omero Lawler MD;  [facility]    ENDOSCOPIC RETROGRADE CHOLANGIOPANCREATOGRAM N/A 2019    Procedure: ENDOSCOPIC RETROGRADE CHOLANGIOPANCREATOGRAPHY, WITH BILIARY STENT REMOVAL AND STONE EXTRACTION;  Surgeon: Omero Lawler MD;  [facility]    HERNIORRHAPHY INGUINAL  2018    Procedure: Herniorrhaphy inguinal;  Surgeon: Emil Echevarria MD;  [facility]    IR LIVER BIOPSY PERCUTANEOUS  2018    LAPAROTOMY EXPLORATORY      per the patient \"for infection in the LLQ\"     TRANSPLANT LIVER RECIPIENT  DONOR N/A 2018    Procedure: TRANSPLANT LIVER RECIPIENT  DONOR;  Surgeon: Emil Echevarria MD;  [facility]       Family History   Problem Relation Age of Onset    Memory loss Mother     Liver Disease Brother     Skin Cancer No family hx of     Melanoma No family hx of        Social History     Socioeconomic History    Marital status:    Tobacco Use    Smoking status: Former     Packs/day: 0.03     Years: 20.00     Pack years: 0.60     Types: Cigarettes, Cigars     Start " date: 1970     Quit date: 3/14/2018     Years since quittin.5    Smokeless tobacco: Never    Tobacco comments:     Quit smoking 2018, one cigarette after dinner   Substance and Sexual Activity    Alcohol use: No     Comment: Quit drinking 2018    Drug use: No   Social History Narrative    Born in [place], came to [place] years ago.     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cardboard, trimming meats     Social Determinants of Health     Interpersonal Safety: Not At Risk (2023)    Humiliation, Afraid, Rape, and Kick questionnaire     Fear of Current or Ex-Partner: No     Emotionally Abused: No     Physically Abused: No     Sexually Abused: No       Current Outpatient Medications   Medication    amLODIPine (NORVASC) 10 MG tablet    atorvastatin (LIPITOR) 40 MG tablet    blood glucose (NO BRAND SPECIFIED) lancets standard    blood glucose (NO BRAND SPECIFIED) test strip    blood glucose (ONE TOUCH SURESOFT) lancing device    blood glucose monitoring (ONE TOUCH ULTRA 2) meter device kit    blood glucose monitoring (ONE TOUCH ULTRASOFT) lancets    empagliflozin (JARDIANCE) 10 MG TABS tablet    guaiFENesin-dextromethorphan (ROBITUSSIN DM) 100-10 MG/5ML syrup    hydrALAZINE (APRESOLINE) 25 MG tablet    insulin glargine (LANTUS PEN) 100 UNIT/ML pen    insulin pen needle (31G X 5 MM) 31G X 5 MM miscellaneous    levothyroxine (SYNTHROID/LEVOTHROID) 150 MCG tablet    metFORMIN (GLUCOPHAGE XR) 500 MG 24 hr tablet    mycophenolate (GENERIC EQUIVALENT) 250 MG capsule    nitroGLYcerin (NITROSTAT) 0.4 MG sublingual tablet    predniSONE (DELTASONE) 5 MG tablet    ursodiol (ACTIGALL) 250 MG tablet    Vitamin D3 (CHOLECALCIFEROL) 25 mcg (1000 units) tablet     No current facility-administered medications for this visit.        No Known Allergies    Physical Exam  There were no vitals taken for this visit.  GENERAL: healthy, alert and no distress  RESP: no audible wheeze, cough, or visible cyanosis.  No visible retractions  or increased work of breathing.  Able to speak fully in complete sentences.  PSYCH: mentation appears normal, affect normal/bright, judgement and insight intact, normal speech and appearance well-groomed    RESULTS    Lab Results   Component Value Date    A1C 8.8 (H) 07/19/2023    A1C 9.0 (H) 01/27/2023    A1C 11.1 (H) 10/21/2022    A1C 6.7 (H) 07/27/2022    A1C 10.0 (H) 01/25/2022    A1C 9.1 (H) 07/07/2021    A1C 10.4 (H) 03/24/2021    A1C 7.2 (H) 01/03/2020    A1C 7.3 (H) 09/04/2019    A1C 8.5 (H) 05/23/2019       Hemoglobin A1C   Date Value Ref Range Status   07/19/2023 8.8 (H) <5.7 % Final     Comment:     Normal <5.7%   Prediabetes 5.7-6.4%    Diabetes 6.5% or higher     Note: Adopted from ADA consensus guidelines.   01/27/2023 9.0 (H) <5.7 % Final     Comment:     Normal <5.7%   Prediabetes 5.7-6.4%    Diabetes 6.5% or higher     Note: Adopted from ADA consensus guidelines.   10/21/2022 11.1 (H) <5.7 % Final     Comment:     Normal <5.7%   Prediabetes 5.7-6.4%    Diabetes 6.5% or higher     Note: Adopted from ADA consensus guidelines.   07/27/2022 6.7 (H) 0.0 - 5.6 % Final     Comment:     Normal <5.7%   Prediabetes 5.7-6.4%    Diabetes 6.5% or higher     Note: Adopted from ADA consensus guidelines.   01/25/2022 10.0 (H) 0.0 - 5.6 % Final     Comment:     Normal <5.7%   Prediabetes 5.7-6.4%    Diabetes 6.5% or higher     Note: Adopted from ADA consensus guidelines.   07/07/2021 9.1 (H) 0 - 5.6 % Final     Comment:     Normal <5.7% Prediabetes 5.7-6.4%  Diabetes 6.5% or higher - adopted from ADA   consensus guidelines.     03/24/2021 10.4 (H) 0 - 5.6 % Final     Comment:     Normal <5.7% Prediabetes 5.7-6.4%  Diabetes 6.5% or higher - adopted from ADA   consensus guidelines.     01/03/2020 7.2 (H) 0 - 5.6 % Final     Comment:     Normal <5.7% Prediabetes 5.7-6.4%  Diabetes 6.5% or higher - adopted from ADA   consensus guidelines.     09/04/2019 7.3 (H) 0 - 5.6 % Final     Comment:     Normal <5.7% Prediabetes  5.7-6.4%  Diabetes 6.5% or higher - adopted from ADA   consensus guidelines.     05/23/2019 8.5 (H) 0 - 5.6 % Final     Comment:     Normal <5.7% Prediabetes 5.7-6.4%  Diabetes 6.5% or higher - adopted from ADA   consensus guidelines.         TSH   Date Value Ref Range Status   09/08/2023 2.17 0.30 - 4.20 uIU/mL Final   06/14/2023 4.38 (H) 0.30 - 4.20 uIU/mL Final   01/27/2023 9.80 (H) 0.30 - 4.20 uIU/mL Final   10/04/2022 5.43 (H) 0.30 - 4.20 uIU/mL Final   07/27/2022 11.01 (H) 0.40 - 4.00 mU/L Final   05/04/2022 1.55 0.40 - 4.00 mU/L Final   01/25/2022 12.29 (H) 0.40 - 4.00 mU/L Final   09/13/2021 4.07 (H) 0.40 - 4.00 mU/L Final   07/21/2021 8.05 (H) 0.40 - 4.00 mU/L Final   03/24/2021 9.86 (H) 0.40 - 4.00 mU/L Final   09/25/2020 8.29 (H) 0.40 - 4.00 mU/L Final   07/03/2020 7.88 (H) 0.40 - 4.00 mU/L Final   03/11/2020 8.72 (H) 0.40 - 4.00 mU/L Final   10/16/2019 7.66 (H) 0.40 - 4.00 mU/L Final     T4 Free   Date Value Ref Range Status   03/24/2021 0.96 0.76 - 1.46 ng/dL Final   09/25/2020 1.08 0.76 - 1.46 ng/dL Final   07/03/2020 1.09 0.76 - 1.46 ng/dL Final   03/11/2020 0.94 0.76 - 1.46 ng/dL Final   10/16/2019 0.91 0.76 - 1.46 ng/dL Final     Free T4   Date Value Ref Range Status   06/14/2023 1.18 0.90 - 1.70 ng/dL Final   01/27/2023 1.03 0.90 - 1.70 ng/dL Final   10/04/2022 1.54 0.90 - 1.70 ng/dL Final   07/27/2022 0.85 0.76 - 1.46 ng/dL Final   01/25/2022 1.00 0.76 - 1.46 ng/dL Final       Creatinine   Date Value Ref Range Status   09/08/2023 0.91 0.67 - 1.17 mg/dL Final   09/08/2023 0.98 0.67 - 1.17 mg/dL Final   07/07/2021 0.79 0.66 - 1.25 mg/dL Final   04/23/2021 0.84 0.66 - 1.25 mg/dL Final       Potassium   Date Value Ref Range Status   09/08/2023 4.7 3.4 - 5.3 mmol/L Final   09/08/2023 6.0 (H) 3.4 - 5.3 mmol/L Final   07/27/2022 4.5 3.4 - 5.3 mmol/L Final   05/04/2022 5.1 3.4 - 5.3 mmol/L Final   07/07/2021 4.7 3.4 - 5.3 mmol/L Final   04/23/2021 4.9 3.4 - 5.3 mmol/L Final     Potassium POCT   Date Value  Ref Range Status   07/19/2023 5.0 3.4 - 5.3 mmol/L Final       No results found for: MICROALBUMIN    ALT   Date Value Ref Range Status   09/08/2023 23 0 - 70 U/L Final     Comment:     Reference intervals for this test were updated on 6/12/2023 to more accurately reflect our healthy population. There may be differences in the flagging of prior results with similar values performed with this method. Interpretation of those prior results can be made in the context of the updated reference intervals.     07/19/2023 28 0 - 70 U/L Final     Comment:     Reference intervals for this test were updated on 6/12/2023 to more accurately reflect our healthy population. There may be differences in the flagging of prior results with similar values performed with this method. Interpretation of those prior results can be made in the context of the updated reference intervals.     07/07/2021 17 0 - 70 U/L Final   03/24/2021 18 0 - 70 U/L Final       Recent Labs   Lab Test 07/19/23  1411 01/25/22  0851   CHOL 153 158   HDL 61 57   LDL 58 74   TRIG 170* 136       No components found for: [id number]  No results found for: GADAB  No results found for: CPEPT    Vitamin D Deficiency screening   Date Value Ref Range Status   09/04/2019 20 20 - 75 ug/L Final     Comment:     Season, race, dietary intake, and treatment affect the concentration of   25-hydroxy-Vitamin D. Values may decrease during winter months and increase   during summer months. Values 20-29 ug/L may indicate Vitamin D insufficiency   and values <20 ug/L may indicate Vitamin D deficiency.  Vitamin D determination is routinely performed by an immunoassay specific for   25 hydroxyvitamin D3.  If an individual is on vitamin D2 (ergocalciferol)   supplementation, please specify 25 OH vitamin D2 and D3 level determination by   LCMSMS test [id number].     07/19/2018 6 (L) 20 - 75 ug/L Final     Comment:     Season, race, dietary intake, and treatment affect the concentration of    25-hydroxy-Vitamin D. Values may decrease during winter months and increase   during summer months. Values 20-29 ug/L may indicate Vitamin D insufficiency   and values <20 ug/L may indicate Vitamin D deficiency.  Vitamin D determination is routinely performed by an immunoassay specific for   25 hydroxyvitamin D3.  If an individual is on vitamin D2 (ergocalciferol)   supplementation, please specify 25 OH vitamin D2 and D3 level determination by   LCMSMS test [email or web address].       Vitamin D, Total (25-Hydroxy)   Date Value Ref Range Status   07/19/2023 17 (L) 20 - 75 ug/L Final   03/02/2022 12 (L) 20 - 75 ug/L Final     Parathyroid Hormone Intact   Date Value Ref Range Status   07/19/2023 134 (H) 15 - 65 pg/mL Final   03/02/2022 83 (H) 15 - 65 pg/mL Final     Calcium   Date Value Ref Range Status   09/08/2023 8.8 8.8 - 10.2 mg/dL Final   09/08/2023 9.3 8.8 - 10.2 mg/dL Final   07/07/2021 8.3 (L) 8.5 - 10.1 mg/dL Final   04/23/2021 8.3 (L) 8.5 - 10.1 mg/dL Final     No results found for: CALCIUMIONIZ  Albumin   Date Value Ref Range Status   09/08/2023 3.9 3.5 - 5.2 g/dL Final   07/19/2023 4.0 3.5 - 5.2 g/dL Final   07/27/2022 3.1 (L) 3.4 - 5.0 g/dL Final   05/04/2022 3.1 (L) 3.4 - 5.0 g/dL Final   07/07/2021 3.4 3.4 - 5.0 g/dL Final   04/23/2021 3.3 (L) 3.4 - 5.0 g/dL Final         Assessment/Plan:     1.  Steroid induced diabetes- Glucose is running a bit high post-prandially.  He has not yet started Jardiance, so I will call the pharmacy today to see why it was not filled.  We did discuss the role of SGLT-2 inhibitors, risks, side effects including thirst, frequent urination, dehydration, weight loss, yeast infection and euglycemic DKA.  Patient was advised to drink more water and stop during times of illness/dehydration.  I did warn him that his glucose may go too low if we do not reduce his Lantus.  We made the following plan today (instructions given to patient):      Start Jardiance 10 mg daily.   Stop this medication  during times of illness or dehydration.  Drink plenty of water.     This will likely lower your blood sugars, so we may need to reduce your Lantus insulin to 15 units if you start going low in the night.     Schedule labs.  Lab [phone]    Please let me know if you are having low blood sugars less than 70 or over 250 mg/dL.      If you have concerns, please send me a [address] message M-Th, call the clinic at [phone], or call [phone] after hours/weekends and ask to speak with the endocrinologist on call.      2.  Risk factors-     Retinopathy:  No.  Had eye exam within last year.   Nephropathy:  BP has been high. He reports recently changing his blood pressure medication.  Addition of jardiance should also help.  Urine protein + in 2023. Seen by Nephrology staff. Pt taking Cozaar.  Will check for microalbuminuria.  Creatinine stable.   Neuropathy: No.    Feet: OK, no ulcers.   Taking ASA: no  Lipids:  LDL at target.  Patient taking statin    3.  F/U in 3 months with Leia Edward after returning from [place].     31 minutes spent on the date of the encounter doing chart review, review of test results, review and interpretation of glucose data, patient visit and documentation, counseling/coordination of care, and discussion of follow up plan for worsening hyper and hypoglycemia.  The patient understood and is satisfied with today's visit.     Barby Jiang PA-C, MPAS   [facility]  Department of Medicine  Division of Endocrinology and Diabetes

## 2023-10-02 NOTE — CONFIDENTIAL NOTE
9/15/23  AM: 85  PM: 160    9/18/23  AM: 86  PM: 180    9/19/23  AM: 106  PM: 188    9/20/23  AM: 100  PM: 190    9/21/23  AM: 130  PM: 180    9/22/23  AM: 98  PM: 150    9/23/23  AM: 80  PM: 110    9/24/23  AM: 100  PM: 170    9/25/23  AM: 120  PM: 200    9/26/23  AM: 90  PM: 160    9/27/23  AM: 80  PM: 180    9/28/23  AM: 80  PM: 180    9/29/23  AM: 120  PM: 145    9/30/23  AM: 82  PM: 190    10/1/23  AM:   PM: 140    10/2/23  AM: 75

## 2023-10-03 ENCOUNTER — VIRTUAL VISIT (OUTPATIENT)
Dept: ENDOCRINOLOGY | Facility: CLINIC | Age: 68
End: 2023-10-03
Payer: COMMERCIAL

## 2023-10-03 DIAGNOSIS — E11.9 TYPE 2 DIABETES MELLITUS WITHOUT COMPLICATION, WITH LONG-TERM CURRENT USE OF INSULIN (H): ICD-10-CM

## 2023-10-03 DIAGNOSIS — E11.9 DM TYPE 2, GOAL HBA1C 7%-8% (H): ICD-10-CM

## 2023-10-03 DIAGNOSIS — E11.65 UNCONTROLLED TYPE 2 DIABETES MELLITUS WITH HYPERGLYCEMIA (H): ICD-10-CM

## 2023-10-03 DIAGNOSIS — Z79.4 TYPE 2 DIABETES MELLITUS WITHOUT COMPLICATION, WITH LONG-TERM CURRENT USE OF INSULIN (H): ICD-10-CM

## 2023-10-03 PROCEDURE — 99443 PR PHYSICIAN TELEPHONE EVALUATION 21-30 MIN: CPT | Mod: 95 | Performed by: PHYSICIAN ASSISTANT

## 2023-10-03 RX ORDER — ATORVASTATIN CALCIUM 40 MG/1
40 TABLET, FILM COATED ORAL DAILY
Qty: 90 TABLET | Refills: 3 | Status: SHIPPED | OUTPATIENT
Start: 2023-10-03

## 2023-10-03 RX ORDER — METFORMIN HCL 500 MG
1000 TABLET, EXTENDED RELEASE 24 HR ORAL 2 TIMES DAILY WITH MEALS
Qty: 360 TABLET | Refills: 3 | Status: SHIPPED | OUTPATIENT
Start: 2023-10-03 | End: 2023-10-13

## 2023-10-03 NOTE — PATIENT INSTRUCTIONS
Start Jardiance 10 mg daily.   Stop this medication during times of illness or dehydration.  Drink plenty of water.     This will likely lower your blood sugars, so we may need to reduce your Lantus insulin to 15 units if you start going low in the night.     Schedule labs.  Lab [phone]    Please let me know if you are having low blood sugars less than 70 or over 250 mg/dL.      If you have concerns, please send me a [employer] message M, call the clinic at [phone], or call [phone] after hours/weekends and ask to speak with the endocrinologist on call.

## 2023-10-03 NOTE — Clinical Note
Justyna- not sure what the issue is with him getting the jardiance, but I called the pharmacy and they said they will run it.  He will see you in January after returning from [place].  Thanks! Barby

## 2023-10-03 NOTE — NURSING NOTE
Is the patient currently in the state of [place]? YES    Visit mode:TELEPHONE    If the visit is dropped, the patient can be reconnected by: TELEPHONE VISIT: Phone number:   Telephone Information:   Mobile [phone]       Will anyone else be joining the visit? NO  (If patient encounters technical issues they should call [phone]    How would you like to obtain your AVS? MyChart    Are changes needed to the allergy or medication list? Pt stated no med changes    Reason for visit: ALMA Pan CMA, VVF

## 2023-10-03 NOTE — LETTER
10/3/2023       RE: Loy Limon  [address]  [facility] [place] [address]     Dear Colleague,    Thank you for referring your patient, Loy Limon, to the [facility] ENDOCRINOLOGY CLINIC [place] at [facility]. Please see a copy of my visit note below.    Outcome for 09/29/23 1:17 PM: Reached patient but requests call back at anytime after 8am on 10/2/23  used [place]    Milly Pan MA  Outcome for 10/02/23 10:37 AM: Data obtained via phone and located below used  id# [id number]  Milly Pan MA    Patient is showing 2/5 MNCM met. BP out range, A1c not in range, and Aspirin not prescribed   Milly Pan MA    Start time: 0816  End time:   Provider location: off site- home  Patient location: off site- home.    HPI:   Mr. Limon is a 68 man here with a  for follow up of steroid/immunosuppressive induced diabetes.   was on the phone today. He usually sees Leia Edward and Sue Tavares. Today is the first time I am meeting him.  He underwent a liver transplant in Dec 2018 due to cirrhosis/hepatocellular carcinoma. He also has a history of CAD s/p stent, atrial fib,HTN, BPH, hypothyroidism, prostate cancer and TB - lung latent.    Today, he reports he is doing well.  At his last visit, Shahida Guthrie added Jardiance 10 mg daily, however he never received the prescription.  No hypoglycemia.  He will be going to [place] for 3 months.  Needs refills for 3 months, leaving 19th of October.      Current treatment regimen:   Lantus 20 units at bedtime  Metformin 1000 mg BID.  Jardiance 10 mg daily- added at last visit, but he never received this.     Previous treatments:   Trulicity-  did not like the injection     Recent glucose:     9/15/23  AM: 85  PM: 160    9/18/23  AM: 86  PM: 180    9/19/23  AM: 106  PM: 188    9/20/23  AM: 100  PM: 190    9/21/23  AM: 130  PM:  180    23  AM: 98  PM: 150    23  AM: 80  PM: 110    23  AM: 100  PM: 170    23  AM: 120  PM: 200    23  AM: 90  PM: 160    23  AM: 80  PM: 180    23  AM: 80  PM: 180    23  AM: 120  PM: 145    23  AM: 82  PM: 190    10/1/23  AM:   PM: 140    10/2/23  AM: 75    Diabetes Care  Retinopathy: none per pt; last seen in Oct 2022.  Nephropathy: Urine protein + in 2023. Seen by Nephrology staff. Pt taking Cozaar.  Neuropathy: none.  Foot Exam: no ulcers.  Taking aspirin:   Lipids: LDL 58 in 2023. Pt taking Lipitor.    Typical day:   Diet- eating well.  Better appetite.    Activity-walks every day.     Diabetes Type:   Steroid-induced Diabetes    Diabetes Control:   Lab Results   Component Value Date    A1C 8.8 2023    A1C 9.0 2023    A1C 11.1 10/21/2022    A1C 6.7 2022    A1C 10.0 2022    A1C 9.1 2021    A1C 10.4 2021    A1C 7.2 2020    A1C 7.3 2019    A1C 8.5 2019       Past Medical History:   Diagnosis Date    Anemia     Biliary stricture of transplanted liver (H) 2018    BPH (benign prostatic hyperplasia)     Cancer (H)     hepatocellualr carcinoma    Cirrhosis of liver (H) 2018    Diabetes (H)     Enlarged prostate     Hypothyroidism     Impotence of organic origin 2020    Inguinal hernia     Repaired with mesh on 18    Liver lesion 2018    Liver transplanted (H) 2018     donor liver transplant    Portal vein thrombosis     on path explant    Postoperative atrial fibrillation (H) 2018    Prostate cancer (H)     TB lung, latent     Treated        Past Surgical History:   Procedure Laterality Date    APPENDECTOMY      COLONOSCOPY          CV CORONARY ANGIOGRAM N/A 10/13/2021    Procedure: CV CORONARY ANGIOGRAM;  Surgeon: Yaya Hampton MD;  Location:  HEART CARDIAC CATH LAB    CV CORONARY LITHOTRIPSY PCI N/A 10/13/2021    Procedure: CV Coronary  "Lithotripsy PCI;  Surgeon: Yaya Hampton MD;  Location:  HEART CARDIAC CATH LAB    CV INSTANTANEOUS WAVE-FREE RATIO N/A 10/13/2021    Procedure: Instantaneous Wave-Free Ratio;  Surgeon: Yaya Hampton MD;  Location:  HEART CARDIAC CATH LAB    CV INTRAVASULAR ULTRASOUND N/A 10/13/2021    Procedure: Intravascular Ultrasound;  Surgeon: Yaya Hampton MD;  Location:  HEART CARDIAC CATH LAB    CV PCI STENT DRUG ELUTING N/A 10/13/2021    Procedure: Percutaneous Coronary Intervention Stent Drug Eluting;  Surgeon: Yaya Hampton MD;  Location:  HEART CARDIAC CATH LAB    DAVINCI PROSTATECTOMY N/A 1/3/2020    Procedure: Robot-assisted laparoscopic radical prostatectomy, Bladder biopsy;  Surgeon: Kenrick Casiano MD;  Location: [facility]    ENDOSCOPIC RETROGRADE CHOLANGIOPANCREATOGRAM N/A 2018    Procedure: ENDOSCOPIC RETROGRADE CHOLANGIOPANCREATOGRAM, with Billary Sphincterotomy and Billary Stent Placement;  Surgeon: Omero Lawler MD;  Location: [facility]    ENDOSCOPIC RETROGRADE CHOLANGIOPANCREATOGRAM  2019    Procedure: COMBINED ENDOSCOPIC RETROGRADE CHOLANGIOPANCREATOGRAPHY, Bile Duct Stent Exchange;  Surgeon: Omero Lawler MD;  [facility]: [facility]    ENDOSCOPIC RETROGRADE CHOLANGIOPANCREATOGRAM N/A 2019    Procedure: ENDOSCOPIC RETROGRADE CHOLANGIOPANCREATOGRAPHY, WITH BILIARY STENT REMOVAL AND STONE EXTRACTION;  Surgeon: Omero Lawler MD;  Location: [facility]    HERNIORRHAPHY INGUINAL  2018    Procedure: Herniorrhaphy inguinal;  Surgeon: Emil Echevarria MD;  Location: [facility]    IR LIVER BIOPSY PERCUTANEOUS  2018    LAPAROTOMY EXPLORATORY      per the patient \"for infection in the LLQ\"     TRANSPLANT LIVER RECIPIENT  DONOR N/A 2018    Procedure: TRANSPLANT LIVER RECIPIENT  DONOR;  Surgeon: Emil Echevarria MD;  Location: [facility] OR       Family History   Problem Relation Age " of Onset    Memory loss Mother     Liver Disease Brother     Skin Cancer No family hx of     Melanoma No family hx of        Social History     Socioeconomic History    Marital status:    Tobacco Use    Smoking status: Former     Packs/day: 0.03     Years: 20.00     Pack years: 0.60     Types: Cigarettes, Cigars     Start date: 1970     Quit date: 3/14/2018     Years since quittin.5    Smokeless tobacco: Never    Tobacco comments:     Quit smoking 2018, one cigarette after dinner   Substance and Sexual Activity    Alcohol use: No     Comment: Quit drinking 2018    Drug use: No   Social History Narrative    Born in [place], came to [place] years [place].     Has worked in [employer], trimming meats     Social Determinants of Health     Interpersonal Safety: Not At Risk (2023)    Humiliation, Afraid, Rape, and Kick questionnaire     Fear of Current or Ex-Partner: No     Emotionally Abused: No     Physically Abused: No     Sexually Abused: No       Current Outpatient Medications   Medication    amLODIPine (NORVASC) 10 MG tablet    atorvastatin (LIPITOR) 40 MG tablet    blood glucose (NO BRAND SPECIFIED) lancets standard    blood glucose (NO BRAND SPECIFIED) test strip    blood glucose (ONE TOUCH SURESOFT) lancing device    blood glucose monitoring (ONE TOUCH ULTRA 2) meter device kit    blood glucose monitoring (ONE TOUCH ULTRASOFT) lancets    empagliflozin (JARDIANCE) 10 MG TABS tablet    guaiFENesin-dextromethorphan (ROBITUSSIN DM) 100-10 MG/5ML syrup    hydrALAZINE (APRESOLINE) 25 MG tablet    insulin glargine (LANTUS PEN) 100 UNIT/ML pen    insulin pen needle (31G X 5 MM) 31G X 5 MM miscellaneous    levothyroxine (SYNTHROID/LEVOTHROID) 150 MCG tablet    metFORMIN (GLUCOPHAGE XR) 500 MG 24 hr tablet    mycophenolate (GENERIC EQUIVALENT) 250 MG capsule    nitroGLYcerin (NITROSTAT) 0.4 MG sublingual tablet    predniSONE (DELTASONE) 5 MG tablet    ursodiol (ACTIGALL) 250 MG tablet     Vitamin D3 (CHOLECALCIFEROL) 25 mcg (1000 units) tablet     No current facility-administered medications for this visit.        No Known Allergies    Physical Exam  There were no vitals taken for this visit.  GENERAL: healthy, alert and no distress  RESP: no audible wheeze, cough, or visible cyanosis.  No visible retractions or increased work of breathing.  Able to speak fully in complete sentences.  PSYCH: mentation appears normal, affect normal/bright, judgement and insight intact, normal speech and appearance well-groomed    RESULTS    Lab Results   Component Value Date    A1C 8.8 (H) 07/19/2023    A1C 9.0 (H) 01/27/2023    A1C 11.1 (H) 10/21/2022    A1C 6.7 (H) 07/27/2022    A1C 10.0 (H) 01/25/2022    A1C 9.1 (H) 07/07/2021    A1C 10.4 (H) 03/24/2021    A1C 7.2 (H) 01/03/2020    A1C 7.3 (H) 09/04/2019    A1C 8.5 (H) 05/23/2019       Hemoglobin A1C   Date Value Ref Range Status   07/19/2023 8.8 (H) <5.7 % Final     Comment:     Normal <5.7%   Prediabetes 5.7-6.4%    Diabetes 6.5% or higher     Note: Adopted from ADA consensus guidelines.   01/27/2023 9.0 (H) <5.7 % Final     Comment:     Normal <5.7%   Prediabetes 5.7-6.4%    Diabetes 6.5% or higher     Note: Adopted from ADA consensus guidelines.   10/21/2022 11.1 (H) <5.7 % Final     Comment:     Normal <5.7%   Prediabetes 5.7-6.4%    Diabetes 6.5% or higher     Note: Adopted from ADA consensus guidelines.   07/27/2022 6.7 (H) 0.0 - 5.6 % Final     Comment:     Normal <5.7%   Prediabetes 5.7-6.4%    Diabetes 6.5% or higher     Note: Adopted from ADA consensus guidelines.   01/25/2022 10.0 (H) 0.0 - 5.6 % Final     Comment:     Normal <5.7%   Prediabetes 5.7-6.4%    Diabetes 6.5% or higher     Note: Adopted from ADA consensus guidelines.   07/07/2021 9.1 (H) 0 - 5.6 % Final     Comment:     Normal <5.7% Prediabetes 5.7-6.4%  Diabetes 6.5% or higher - adopted from ADA   consensus guidelines.     03/24/2021 10.4 (H) 0 - 5.6 % Final     Comment:     Normal <5.7%  Prediabetes 5.7-6.4%  Diabetes 6.5% or higher - adopted from ADA   consensus guidelines.     01/03/2020 7.2 (H) 0 - 5.6 % Final     Comment:     Normal <5.7% Prediabetes 5.7-6.4%  Diabetes 6.5% or higher - adopted from ADA   consensus guidelines.     09/04/2019 7.3 (H) 0 - 5.6 % Final     Comment:     Normal <5.7% Prediabetes 5.7-6.4%  Diabetes 6.5% or higher - adopted from ADA   consensus guidelines.     05/23/2019 8.5 (H) 0 - 5.6 % Final     Comment:     Normal <5.7% Prediabetes 5.7-6.4%  Diabetes 6.5% or higher - adopted from ADA   consensus guidelines.         TSH   Date Value Ref Range Status   09/08/2023 2.17 0.30 - 4.20 uIU/mL Final   06/14/2023 4.38 (H) 0.30 - 4.20 uIU/mL Final   01/27/2023 9.80 (H) 0.30 - 4.20 uIU/mL Final   10/04/2022 5.43 (H) 0.30 - 4.20 uIU/mL Final   07/27/2022 11.01 (H) 0.40 - 4.00 mU/L Final   05/04/2022 1.55 0.40 - 4.00 mU/L Final   01/25/2022 12.29 (H) 0.40 - 4.00 mU/L Final   09/13/2021 4.07 (H) 0.40 - 4.00 mU/L Final   07/21/2021 8.05 (H) 0.40 - 4.00 mU/L Final   03/24/2021 9.86 (H) 0.40 - 4.00 mU/L Final   09/25/2020 8.29 (H) 0.40 - 4.00 mU/L Final   07/03/2020 7.88 (H) 0.40 - 4.00 mU/L Final   03/11/2020 8.72 (H) 0.40 - 4.00 mU/L Final   10/16/2019 7.66 (H) 0.40 - 4.00 mU/L Final     T4 Free   Date Value Ref Range Status   03/24/2021 0.96 0.76 - 1.46 ng/dL Final   09/25/2020 1.08 0.76 - 1.46 ng/dL Final   07/03/2020 1.09 0.76 - 1.46 ng/dL Final   03/11/2020 0.94 0.76 - 1.46 ng/dL Final   10/16/2019 0.91 0.76 - 1.46 ng/dL Final     Free T4   Date Value Ref Range Status   06/14/2023 1.18 0.90 - 1.70 ng/dL Final   01/27/2023 1.03 0.90 - 1.70 ng/dL Final   10/04/2022 1.54 0.90 - 1.70 ng/dL Final   07/27/2022 0.85 0.76 - 1.46 ng/dL Final   01/25/2022 1.00 0.76 - 1.46 ng/dL Final       Creatinine   Date Value Ref Range Status   09/08/2023 0.91 0.67 - 1.17 mg/dL Final   09/08/2023 0.98 0.67 - 1.17 mg/dL Final   07/07/2021 0.79 0.66 - 1.25 mg/dL Final   04/23/2021 0.84 0.66 - 1.25 mg/dL  Final       Potassium   Date Value Ref Range Status   09/08/2023 4.7 3.4 - 5.3 mmol/L Final   09/08/2023 6.0 (H) 3.4 - 5.3 mmol/L Final   07/27/2022 4.5 3.4 - 5.3 mmol/L Final   05/04/2022 5.1 3.4 - 5.3 mmol/L Final   07/07/2021 4.7 3.4 - 5.3 mmol/L Final   04/23/2021 4.9 3.4 - 5.3 mmol/L Final     Potassium POCT   Date Value Ref Range Status   07/19/2023 5.0 3.4 - 5.3 mmol/L Final       No results found for: MICROALBUMIN    ALT   Date Value Ref Range Status   09/08/2023 23 0 - 70 U/L Final     Comment:     Reference intervals for this test were updated on 6/12/2023 to more accurately reflect our healthy population. There may be differences in the flagging of prior results with similar values performed with this method. Interpretation of those prior results can be made in the context of the updated reference intervals.     07/19/2023 28 0 - 70 U/L Final     Comment:     Reference intervals for this test were updated on 6/12/2023 to more accurately reflect our healthy population. There may be differences in the flagging of prior results with similar values performed with this method. Interpretation of those prior results can be made in the context of the updated reference intervals.     07/07/2021 17 0 - 70 U/L Final   03/24/2021 18 0 - 70 U/L Final       Recent Labs   Lab Test 07/19/23  1411 01/25/22  0851   CHOL 153 158   HDL 61 57   LDL 58 74   TRIG 170* 136       No components found for: [id number]  No results found for: GADAB  No results found for: CPEPT    Vitamin D Deficiency screening   Date Value Ref Range Status   09/04/2019 20 20 - 75 ug/L Final     Comment:     Season, race, dietary intake, and treatment affect the concentration of   25-hydroxy-Vitamin D. Values may decrease during winter months and increase   during summer months. Values 20-29 ug/L may indicate Vitamin D insufficiency   and values <20 ug/L may indicate Vitamin D deficiency.  Vitamin D determination is routinely performed by an  immunoassay specific for   25 hydroxyvitamin D3.  If an individual is on vitamin D2 (ergocalciferol)   supplementation, please specify 25 OH vitamin D2 and D3 level determination by   LCMSMS test [email or web address].     07/19/2018 6 (L) 20 - 75 ug/L Final     Comment:     Season, race, dietary intake, and treatment affect the concentration of   25-hydroxy-Vitamin D. Values may decrease during winter months and increase   during summer months. Values 20-29 ug/L may indicate Vitamin D insufficiency   and values <20 ug/L may indicate Vitamin D deficiency.  Vitamin D determination is routinely performed by an immunoassay specific for   25 hydroxyvitamin D3.  If an individual is on vitamin D2 (ergocalciferol)   supplementation, please specify 25 OH vitamin D2 and D3 level determination by   LCMSMS test [email or web address].       Vitamin D, Total (25-Hydroxy)   Date Value Ref Range Status   07/19/2023 17 (L) 20 - 75 ug/L Final   03/02/2022 12 (L) 20 - 75 ug/L Final     Parathyroid Hormone Intact   Date Value Ref Range Status   07/19/2023 134 (H) 15 - 65 pg/mL Final   03/02/2022 83 (H) 15 - 65 pg/mL Final     Calcium   Date Value Ref Range Status   09/08/2023 8.8 8.8 - 10.2 mg/dL Final   09/08/2023 9.3 8.8 - 10.2 mg/dL Final   07/07/2021 8.3 (L) 8.5 - 10.1 mg/dL Final   04/23/2021 8.3 (L) 8.5 - 10.1 mg/dL Final     No results found for: CALCIUMIONIZ  Albumin   Date Value Ref Range Status   09/08/2023 3.9 3.5 - 5.2 g/dL Final   07/19/2023 4.0 3.5 - 5.2 g/dL Final   07/27/2022 3.1 (L) 3.4 - 5.0 g/dL Final   05/04/2022 3.1 (L) 3.4 - 5.0 g/dL Final   07/07/2021 3.4 3.4 - 5.0 g/dL Final   04/23/2021 3.3 (L) 3.4 - 5.0 g/dL Final         Assessment/Plan:     1.  Steroid induced diabetes- Glucose is running a bit high post-prandially.  He has not yet started Jardiance, so I will call the pharmacy today to see why it was not filled.  We did discuss the role of SGLT-2 inhibitors, risks, side effects including thirst, frequent urination, dehydration, weight  loss, yeast infection and euglycemic DKA.  Patient was advised to drink more water and stop during times of illness/dehydration.  I did warn him that his glucose may go too low if we do not reduce his Lantus.  We made the following plan today (instructions given to patient):      Start Jardiance 10 mg daily.   Stop this medication during times of illness or dehydration.  Drink plenty of water.     This will likely lower your blood sugars, so we may need to reduce your Lantus insulin to 15 units if you start going low in the night.     Schedule labs.  Lab [phone]    Please let me know if you are having low blood sugars less than 70 or over 250 mg/dL.      If you have concerns, please send me a CAPPTURE message M-Th, call the clinic at [phone], or call [phone] after hours/weekends and ask to speak with the endocrinologist on call.      2.  Risk factors-     Retinopathy:  No.  Had eye exam within last year.   Nephropathy:  BP has been high. He reports recently changing his blood pressure medication.  Addition of jardiance should also help.  Urine protein + in 2023. Seen by Nephrology staff. Pt taking Cozaar.  Will check for microalbuminuria.  Creatinine stable.   Neuropathy: No.    Feet: OK, no ulcers.   Taking ASA: no  Lipids:  LDL at target.  Patient taking statin    3.  F/U in 3 months with Leia Edward after returning from [place].     31 minutes spent on the date of the encounter doing chart review, review of test results, review and interpretation of glucose data, patient visit and documentation, counseling/coordination of care, and discussion of follow up plan for worsening hyper and hypoglycemia.  The patient understood and is satisfied with today's visit.     Barby Jiang PA-C, MPAS   [facility]  Department of Medicine  Division of Endocrinology and Diabetes

## 2023-10-12 NOTE — PROGRESS NOTES
Patient is showing 2/5 MNCM met. BP out range   Appointment reminder phone call made to patient. Advised pt to bring meter and arrive 15 mins early. PT verbalized understanding.  Outcome for 10/12/23 9:41 AM: Per patient, will upload device before appointment  Yoselyn Hilliard CMA

## 2023-10-12 NOTE — PROGRESS NOTES
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 A  over the phone assisted with this visit. He also sees the PAs in this clinic, last visit with Barby Jiang on 10/3/2023 (telephone).     Interval history:   Feels bloated, if he eats he feels his stomach is full too soon   Weight is stable     Post transplant diabetes mellitus  He has history of cirrhosis with HCC and underwent living donor liver transplant on 12/22/2018. He developed steroid/immunosuppressant induced diabetes and was treated with NPH insulin, which was discontinued once he was no longer taking steroids.    Current treatment strategy:   Glargine 20 units at bedtime   Metformin 1000 mg twice daily   Jardiance 10 mg daily - has picked this up but not yet started     Has enough DM meds and supplies to go to [place] - he will be leaving soon and will be gone for 3 months.     Blood glucose monitoring:   He checks finger stick blood glucose. Recent numbers were  10/13 2:50 am 95  10/10 3:30 am 194  9/30 3:30 am 128   9/29 2:47 am 181  9/26 10 am 339  9/26 5:05 am 209     He had a low blood sugar around 3 months ago. There has been no recent symptomatic hypoglycemia.     Medications:   Current Outpatient Medications   Medication Sig Dispense Refill    amLODIPine (NORVASC) 10 MG tablet Take 1 tablet (10 mg) by mouth daily 90 tablet 1    atorvastatin (LIPITOR) 40 MG tablet Take 1 tablet (40 mg) by mouth daily 90 tablet 3    blood glucose (NO BRAND SPECIFIED) lancets standard Use to test blood sugar 2 times daily or as directed. 50 lancet 3    blood glucose (NO BRAND SPECIFIED) test strip Use to test blood sugar 2 times daily or as directed. One touch ultra test strips 50 strip 3    blood glucose (ONE TOUCH SURESOFT) lancing device Lancing device to be used with lancets. 1 each 0    blood glucose  monitoring (ONE TOUCH ULTRA 2) meter device kit Use to test blood sugar 2 times daily or as directed. 1 kit 0    blood glucose monitoring (ONE TOUCH ULTRASOFT) lancets Use to test blood sugar 2 times daily. 100 each 6    empagliflozin (JARDIANCE) 10 MG TABS tablet Take 1 tablet (10 mg) by mouth daily 90 tablet 3    guaiFENesin-dextromethorphan (ROBITUSSIN DM) 100-10 MG/5ML syrup Take 10 mLs by mouth every 4 hours as needed for cough (Patient not taking: Reported on 8/8/2023) 30 mL 0    hydrALAZINE (APRESOLINE) 25 MG tablet Take 1 tablet (25 mg) by mouth 2 times daily 60 tablet 3    insulin glargine (LANTUS PEN) 100 UNIT/ML pen Inject 20-30 Units Subcutaneous At Bedtime Please increase by 1 unit each week until fasting BG is 100 - 150 most days. 30 mL 3    insulin pen needle (31G X 5 MM) 31G X 5 MM miscellaneous Use one  pen needles daily or as directed. 100 each 3    levothyroxine (SYNTHROID/LEVOTHROID) 150 MCG tablet Take 1 tablet (150 mcg) by mouth daily 90 tablet 3    metFORMIN (GLUCOPHAGE XR) 500 MG 24 hr tablet Take 2 tablets (1,000 mg) by mouth 2 times daily (with meals) 360 tablet 3    mycophenolate (GENERIC EQUIVALENT) 250 MG capsule TAKE 3 CAPSULES(750 MG) BY MOUTH TWICE DAILY 540 capsule 3    nitroGLYcerin (NITROSTAT) 0.4 MG sublingual tablet For chest pain place 1 tablet under the tongue every 5 minutes for 3 doses. If symptoms persist 5 minutes after 1st dose call 911. 30 tablet 1    predniSONE (DELTASONE) 5 MG tablet Take 1 tablet (5 mg) by mouth daily 90 tablet 3    ursodiol (ACTIGALL) 250 MG tablet Take 1 tablet (250 mg) by mouth 2 times daily 180 tablet 3    Vitamin D3 (CHOLECALCIFEROL) 25 mcg (1000 units) tablet Take 2 tablets (50 mcg) by mouth daily 180 tablet 3       Social History     Tobacco Use    Smoking status: Former     Packs/day: 0.03     Years: 20.00     Additional pack years: 0.00     Total pack years: 0.60     Types: Cigarettes, Cigars     Start date: 8/14/1970     Quit date: 3/14/2018  "    Years since quittin.5    Smokeless tobacco: Never    Tobacco comments:     Quit smoking 2018, one cigarette after dinner   Substance Use Topics    Alcohol use: No     Comment: Quit drinking 2018       Review of Systems:   A comprehensive ROS was negative except as noted in HPI.     Physical Examination:  Wt Readings from Last 4 Encounters:   23 91.6 kg (202 lb)   23 90.3 kg (199 lb)   23 90.3 kg (199 lb)   23 90.3 kg (199 lb)     BP Readings from Last 3 Encounters:   10/13/23 (!) 155/81   23 (!) 165/84   23 (!) 162/80     Blood pressure (!) 155/81, pulse 74, height 1.702 m (5' 7\"), weight 92.1 kg (203 lb).    GENERAL: Healthy, alert and no distress  EYES: Eyes grossly normal to inspection.    NECK: No visible goiter.   RESP: No audible wheeze, cough, or visible cyanosis.  No increased work of breathing.    PSYCH: Affect normal/bright, judgement and insight intact, normal speech and appearance well-groomed.  Extremities: Mild peripheral edema. Feet are warm and well perfused. No lesions or ulcers. Thickened toenails. Callus formation and dry skin.   Neurologic: Sensation to monofilament is mildly reduced in the feet bilaterally.     Labs and Studies:   Lab Results   Component Value Date    A1C 8.8 (H) 2023    A1C 9.0 (H) 2023    A1C 11.1 (H) 10/21/2022    A1C 6.7 (H) 2022    A1C 10.0 (H) 2022    A1C 9.1 (H) 2021    A1C 10.4 (H) 2021    A1C 7.2 (H) 2020    A1C 7.3 (H) 2019    A1C 8.5 (H) 2019    HEMOGLOBINA1 8.1 10/13/2023    HEMOGLOBINA1 12.1 (A) 2021     TSH   Date Value Ref Range Status   2023 2.17 0.30 - 4.20 uIU/mL Final   2022 11.01 (H) 0.40 - 4.00 mU/L Final   2021 9.86 (H) 0.40 - 4.00 mU/L Final     Creatinine   Date Value Ref Range Status   2023 0.91 0.67 - 1.17 mg/dL Final   2021 0.79 0.66 - 1.25 mg/dL Final       Assessment:  Post transplant diabetes mellitus.    A. " Mild peripheral neuropathy, managed with gabapentin. (He reports numbness in toes since prior to the diabetes diagnosis).    B. Elevated urine albumin.   2. Hypothyroidism treated with levothyroxine.  3. Liver transplant.   4. Prostate cancer s/p prostatectomy.   5. Hypertension. BP is high on exam today. He has been taking hydralazine only 1x per day.   6. CAD s/p stent.     Plan:   - Continue current medications for diabetes (glargine and metformin) and start Jardiance.   - He may need to decrease glargine after adding Jardiance if am blood sugars are <90; he has been advised he can decrease glargine to 15 units if he starts going low in the night.   - Refill of hydralazine provided so that he has enough for his trip; he has been taking this 1x per day and was advised to increase to 2x per day as prescribed.   - Jardiance is also expected to decrease BP modestly   - He should follow up with his PCP for hypertension management, ideally before his trip.     Follow up in 4 months with me or with PA.     35 minutes spent on the date of the encounter doing chart review, history and exam, documentation and further activities per the note.     Sue Tavares MD  Endocrinology and Diabetes   [phone]

## 2023-10-13 ENCOUNTER — OFFICE VISIT (OUTPATIENT)
Dept: ENDOCRINOLOGY | Facility: CLINIC | Age: 68
End: 2023-10-13
Payer: COMMERCIAL

## 2023-10-13 VITALS
DIASTOLIC BLOOD PRESSURE: 81 MMHG | HEART RATE: 74 BPM | BODY MASS INDEX: 31.86 KG/M2 | WEIGHT: 203 LBS | SYSTOLIC BLOOD PRESSURE: 155 MMHG | HEIGHT: 67 IN

## 2023-10-13 DIAGNOSIS — I10 PRIMARY HYPERTENSION: ICD-10-CM

## 2023-10-13 DIAGNOSIS — E11.9 DM TYPE 2, GOAL HBA1C 7%-8% (H): Primary | ICD-10-CM

## 2023-10-13 LAB — HBA1C MFR BLD: 8.1 % (ref 4.3–?)

## 2023-10-13 PROCEDURE — 99214 OFFICE O/P EST MOD 30 MIN: CPT | Performed by: INTERNAL MEDICINE

## 2023-10-13 PROCEDURE — 83036 HEMOGLOBIN GLYCOSYLATED A1C: CPT | Performed by: INTERNAL MEDICINE

## 2023-10-13 RX ORDER — METFORMIN HCL 500 MG
1000 TABLET, EXTENDED RELEASE 24 HR ORAL 2 TIMES DAILY WITH MEALS
Qty: 360 TABLET | Refills: 3 | Status: SHIPPED | OUTPATIENT
Start: 2023-10-13 | End: 2024-06-04

## 2023-10-13 RX ORDER — HYDRALAZINE HYDROCHLORIDE 25 MG/1
25 TABLET, FILM COATED ORAL 2 TIMES DAILY
Qty: 180 TABLET | Refills: 3 | Status: SHIPPED | OUTPATIENT
Start: 2023-10-13

## 2023-10-13 NOTE — LETTER
10/13/2023       RE: Loy Limon  [id number]  [facility] [address]     Dear Colleague,    Thank you for referring your patient, Loy Limon, to the [facility] [place] [facility]. Please see a copy of my visit note below.    Patient is showing 2/5 MNCM met. BP out range   Appointment reminder phone call made to patient. Advised pt to bring meter and arrive 15 mins early. PT verbalized understanding.  Outcome for 10/12/23 9:41 AM: Per patient, will upload device before appointment  Yoselyn Hilliard CMA      Endocrinology and Diabetes Clinic Return Visit    Subjective:   Loy Limon was seen today for a follow up visit for steroid/immunosuppresant induced diabetes mellitus and hypothyroidism. A  over the phone assisted with this visit. He also sees the PAs in this clinic, last visit with Barby Jiang on 10/3/2023 (telephone).     Interval history:   Feels bloated, if he eats he feels his stomach is full too soon   Weight is stable     Post transplant diabetes mellitus  He has history of cirrhosis with HCC and underwent living donor liver transplant on 12/22/2018. He developed steroid/immunosuppressant induced diabetes and was treated with NPH insulin, which was discontinued once he was no longer taking steroids.    Current treatment strategy:   Glargine 20 units at bedtime   Metformin 1000 mg twice daily   Jardiance 10 mg daily - has picked this up but not yet started     Has enough DM meds and supplies to go to [place] - he will be leaving soon and will be gone for 3 months.     Blood glucose monitoring:   He checks finger stick blood glucose. Recent numbers were  10/13 2:50 am 95  10/10 3:30 am 194  9/30 3:30 am 128   9/29 2:47 am 181  9/26 10 am 339  9/26 5:05 am 209     He had a low blood sugar around 3 months ago. There has been no recent symptomatic hypoglycemia.     Medications:   Current  Outpatient Medications   Medication Sig Dispense Refill     amLODIPine (NORVASC) 10 MG tablet Take 1 tablet (10 mg) by mouth daily 90 tablet 1     atorvastatin (LIPITOR) 40 MG tablet Take 1 tablet (40 mg) by mouth daily 90 tablet 3     blood glucose (NO BRAND SPECIFIED) lancets standard Use to test blood sugar 2 times daily or as directed. 50 lancet 3     blood glucose (NO BRAND SPECIFIED) test strip Use to test blood sugar 2 times daily or as directed. One touch ultra test strips 50 strip 3     blood glucose (ONE TOUCH SURESOFT) lancing device Lancing device to be used with lancets. 1 each 0     blood glucose monitoring (ONE TOUCH ULTRA 2) meter device kit Use to test blood sugar 2 times daily or as directed. 1 kit 0     blood glucose monitoring (ONE TOUCH ULTRASOFT) lancets Use to test blood sugar 2 times daily. 100 each 6     empagliflozin (JARDIANCE) 10 MG TABS tablet Take 1 tablet (10 mg) by mouth daily 90 tablet 3     guaiFENesin-dextromethorphan (ROBITUSSIN DM) 100-10 MG/5ML syrup Take 10 mLs by mouth every 4 hours as needed for cough (Patient not taking: Reported on 8/8/2023) 30 mL 0     hydrALAZINE (APRESOLINE) 25 MG tablet Take 1 tablet (25 mg) by mouth 2 times daily 60 tablet 3     insulin glargine (LANTUS PEN) 100 UNIT/ML pen Inject 20-30 Units Subcutaneous At Bedtime Please increase by 1 unit each week until fasting BG is 100 - 150 most days. 30 mL 3     insulin pen needle (31G X 5 MM) 31G X 5 MM miscellaneous Use one  pen needles daily or as directed. 100 each 3     levothyroxine (SYNTHROID/LEVOTHROID) 150 MCG tablet Take 1 tablet (150 mcg) by mouth daily 90 tablet 3     metFORMIN (GLUCOPHAGE XR) 500 MG 24 hr tablet Take 2 tablets (1,000 mg) by mouth 2 times daily (with meals) 360 tablet 3     mycophenolate (GENERIC EQUIVALENT) 250 MG capsule TAKE 3 CAPSULES(750 MG) BY MOUTH TWICE DAILY 540 capsule 3     nitroGLYcerin (NITROSTAT) 0.4 MG sublingual tablet For chest pain place 1 tablet under the tongue  "every 5 minutes for 3 doses. If symptoms persist 5 minutes after 1st dose call 911. 30 tablet 1     predniSONE (DELTASONE) 5 MG tablet Take 1 tablet (5 mg) by mouth daily 90 tablet 3     ursodiol (ACTIGALL) 250 MG tablet Take 1 tablet (250 mg) by mouth 2 times daily 180 tablet 3     Vitamin D3 (CHOLECALCIFEROL) 25 mcg (1000 units) tablet Take 2 tablets (50 mcg) by mouth daily 180 tablet 3       Social History     Tobacco Use     Smoking status: Former     Packs/day: 0.03     Years: 20.00     Additional pack years: 0.00     Total pack years: 0.60     Types: Cigarettes, Cigars     Start date: 1970     Quit date: 3/14/2018     Years since quittin.5     Smokeless tobacco: Never     Tobacco comments:     Quit smoking 2018, one cigarette after dinner   Substance Use Topics     Alcohol use: No     Comment: Quit drinking 2018       Review of Systems:   A comprehensive ROS was negative except as noted in HPI.     Physical Examination:  Wt Readings from Last 4 Encounters:   23 91.6 kg (202 lb)   23 90.3 kg (199 lb)   23 90.3 kg (199 lb)   23 90.3 kg (199 lb)     BP Readings from Last 3 Encounters:   10/13/23 (!) 155/81   23 (!) 165/84   23 (!) 162/80     Blood pressure (!) 155/81, pulse 74, height 1.702 m (5' 7\"), weight 92.1 kg (203 lb).    GENERAL: Healthy, alert and no distress  EYES: Eyes grossly normal to inspection.    NECK: No visible goiter.   RESP: No audible wheeze, cough, or visible cyanosis.  No increased work of breathing.    PSYCH: Affect normal/bright, judgement and insight intact, normal speech and appearance well-groomed.  Extremities: Mild peripheral edema. Feet are warm and well perfused. No lesions or ulcers. Thickened toenails. Callus formation and dry skin.   Neurologic: Sensation to monofilament is mildly reduced in the feet bilaterally.     Labs and Studies:   Lab Results   Component Value Date    A1C 8.8 (H) 2023    A1C 9.0 (H) 2023    " A1C 11.1 (H) 10/21/2022    A1C 6.7 (H) 07/27/2022    A1C 10.0 (H) 01/25/2022    A1C 9.1 (H) 07/07/2021    A1C 10.4 (H) 03/24/2021    A1C 7.2 (H) 01/03/2020    A1C 7.3 (H) 09/04/2019    A1C 8.5 (H) 05/23/2019    HEMOGLOBINA1 8.1 10/13/2023    HEMOGLOBINA1 12.1 (A) 02/12/2021     TSH   Date Value Ref Range Status   09/08/2023 2.17 0.30 - 4.20 uIU/mL Final   07/27/2022 11.01 (H) 0.40 - 4.00 mU/L Final   03/24/2021 9.86 (H) 0.40 - 4.00 mU/L Final     Creatinine   Date Value Ref Range Status   09/08/2023 0.91 0.67 - 1.17 mg/dL Final   07/07/2021 0.79 0.66 - 1.25 mg/dL Final       Assessment:  Post transplant diabetes mellitus.    A. Mild peripheral neuropathy, managed with gabapentin. (He reports numbness in toes since prior to the diabetes diagnosis).    B. Elevated urine albumin.   2. Hypothyroidism treated with levothyroxine.  3. Liver transplant.   4. Prostate cancer s/p prostatectomy.   5. Hypertension. BP is high on exam today. He has been taking hydralazine only 1x per day.   6. CAD s/p stent.     Plan:   - Continue current medications for diabetes (glargine and metformin) and start Jardiance.   - He may need to decrease glargine after adding Jardiance if am blood sugars are <90; he has been advised he can decrease glargine to 15 units if he starts going low in the night.   - Refill of hydralazine provided so that he has enough for his trip; he has been taking this 1x per day and was advised to increase to 2x per day as prescribed.   - Jardiance is also expected to decrease BP modestly   - He should follow up with his PCP for hypertension management, ideally before his trip.     Follow up in 4 months with me or with PA.     35 minutes spent on the date of the encounter doing chart review, history and exam, documentation and further activities per the note.     Sue Tavares MD  Endocrinology and Diabetes   [phone]        Again, thank you for allowing me to participate in the care of your patient.       Sincerely,    Sue Tavares MD

## 2024-01-17 ENCOUNTER — TELEPHONE (OUTPATIENT)
Dept: UROLOGY | Facility: CLINIC | Age: 69
End: 2024-01-17
Payer: COMMERCIAL

## 2024-01-17 ENCOUNTER — APPOINTMENT (OUTPATIENT)
Dept: INTERPRETER SERVICES | Facility: CLINIC | Age: 69
End: 2024-01-17
Payer: COMMERCIAL

## 2024-01-19 DIAGNOSIS — E11.9 DM TYPE 2, GOAL HBA1C 7%-8% (H): Primary | ICD-10-CM

## 2024-01-19 DIAGNOSIS — Z94.4 LIVER TRANSPLANTED (H): ICD-10-CM

## 2024-01-19 RX ORDER — PREDNISONE 5 MG/1
5 TABLET ORAL DAILY
Qty: 90 TABLET | Refills: 1 | Status: SHIPPED | OUTPATIENT
Start: 2024-01-19 | End: 2024-03-02

## 2024-01-19 NOTE — TELEPHONE ENCOUNTER
predniSONE (DELTASONE) 5 MG tablet 90 tablet 3 1/27/2023     Last Office Visit : 5/9/2023  [facility] Jaswinder Salazar MD     Future Office visit:  0    Routing refill request to provider for review/approval because:  Drug not on the [facility], P or [facility] refill protocol

## 2024-01-22 ENCOUNTER — TELEPHONE (OUTPATIENT)
Dept: DERMATOLOGY | Facility: CLINIC | Age: 69
End: 2024-01-22
Payer: COMMERCIAL

## 2024-01-22 NOTE — TELEPHONE ENCOUNTER
Patient Contacted  and schedule the following:    Appointment type: TSC  Provider: NGA ADLER  Return date: 05/13/24  Specialty phone number: [phone]

## 2024-02-01 NOTE — PROGRESS NOTES
Patient is showing 2/5 MNCM met. Aspirin not prescribed   Olamide Renee, VF  Outcome for 02/05/24 2:40 PM :Reached patient but they declined to share any data because they will bring meter to appt.  Olamide Renee  Appointment reminder phone call made to patient.   Olamide Renee    Date Time Reading Time  Reading  Time  Reading    2/6 6am 95       2/4 6am 99       2/2 4am 96 11am 141 6pm 267   2/1   10am 163     1/31     2pm 341   1/30   11am 211     1/27 7am 227

## 2024-02-06 ENCOUNTER — TELEPHONE (OUTPATIENT)
Dept: CARDIOLOGY | Facility: CLINIC | Age: 69
End: 2024-02-06

## 2024-02-06 ENCOUNTER — OFFICE VISIT (OUTPATIENT)
Dept: ENDOCRINOLOGY | Facility: CLINIC | Age: 69
End: 2024-02-06
Payer: COMMERCIAL

## 2024-02-06 VITALS
BODY MASS INDEX: 29.91 KG/M2 | DIASTOLIC BLOOD PRESSURE: 73 MMHG | HEART RATE: 65 BPM | SYSTOLIC BLOOD PRESSURE: 167 MMHG | OXYGEN SATURATION: 96 % | WEIGHT: 191 LBS

## 2024-02-06 DIAGNOSIS — E11.9 DM TYPE 2, GOAL HBA1C 7%-8% (H): ICD-10-CM

## 2024-02-06 LAB — HBA1C MFR BLD: 9.2 %

## 2024-02-06 PROCEDURE — 83036 HEMOGLOBIN GLYCOSYLATED A1C: CPT | Performed by: PATHOLOGY

## 2024-02-06 PROCEDURE — 99215 OFFICE O/P EST HI 40 MIN: CPT | Performed by: PHYSICIAN ASSISTANT

## 2024-02-06 ASSESSMENT — PAIN SCALES - GENERAL: PAINLEVEL: NO PAIN (0)

## 2024-02-06 NOTE — NURSING NOTE
"Chief Complaint   Patient presents with    Diabetes     Vital signs:      BP: (!) 167/73 Pulse: 65     SpO2: 96 %       Weight: 86.6 kg (191 lb)  Estimated body mass index is 29.91 kg/m  as calculated from the following:    Height as of 10/13/23: 1.702 m (5' 7\").    Weight as of this encounter: 86.6 kg (191 lb).        "

## 2024-02-06 NOTE — LETTER
"2/6/2024       RE: Loy Limon  [address]  [facility] [address]     Dear Colleague,    Thank you for referring your patient, Loy Limon, to the [facility]. Please see a copy of my visit note below.    Patient is showing 2/5 MNCM met. Aspirin not prescribed   Olamide Renee, VF  Outcome for 02/05/24 2:40 PM :Reached patient but they declined to share any data because they will bring meter to appt.  Olamide Renee  Appointment reminder phone call made to patient.   Olamide Renee    Date Time Reading Time  Reading  Time  Reading    2/6 6am 95       2/4 6am 99       2/2 4am 96 11am 141 6pm 267   2/1   10am 163     1/31     2pm 341   1/30   11am 211     1/27 7am 227             HPI  Loy Limon is a 68 year old male with steroid/immunosuppressive induced diabetes.  Clinic visit for diabetes follow up today.    He recently returned from a trip to [place] and reports feeling well.  Pt underwent a liver transplant in Dec 2018 due to cirrhosis/hepatocellular carcinoma.  Pt also has hx of CAD s/p stent, atrial fib,HTN, BPH, hypothyroidism, prostate cancer and TB - lung latent.  For his diabetes, he is prescribed to take Lantus 20 units subcutaneous at hs, Jardiance 10 mg each am and Metformin 1000 mg BID.  He stopped taking Trulicity-  he states \" he did not like the injection \".  He tells me he has been missing his Lantus.  Pt's A1C is 9.2 % today. His A1C was 8.8 % on 7/19/2023.    Pt provided me with a few blood sugar readings which I scanned in his note below.  The am blood sugar readings are actually his postprandial blood sugars and his FBS are high.He has not been taking Lantus at hs.  On ROS today, he reports feeling well today.   Intermittent blurred vision.  Denies n/v, SOB at rest, cough, fever, chest pain, abd pain, diarrhea, melena or blood in stool.  No dysuria or hematuria.  No foot " ulcers.    Diabetes Care  Retinopathy: none per pt; last seen in Oct 2022. Reminded him to see Oph.  Nephropathy: Urine protein + in 7/2023. Seen by Nephrology staff. Pt taking Cozaar.  Neuropathy: none.  Foot Exam: no ulcers.  Taking aspirin: no.  Lipids: LDL 58 in 7/2023. Pt taking Lipitor.  Insulin :basal insulin once a day.  DM meds: Metformin and Jardiance.  Pt did NOT like Trulicity.     Testing: glucose meter.          ROS  See under HPI.    Allergies  No Known Allergies    Medications  Current Outpatient Medications   Medication Sig Dispense Refill    amLODIPine (NORVASC) 10 MG tablet Take 1 tablet (10 mg) by mouth daily 90 tablet 1    atorvastatin (LIPITOR) 40 MG tablet Take 1 tablet (40 mg) by mouth daily 90 tablet 3    blood glucose (NO BRAND SPECIFIED) lancets standard Use to test blood sugar 2 times daily or as directed. 50 lancet 3    blood glucose (NO BRAND SPECIFIED) test strip Use to test blood sugar 2 times daily or as directed. One touch ultra test strips 50 strip 5    blood glucose (ONE TOUCH SURESOFT) lancing device Lancing device to be used with lancets. 1 each 0    blood glucose monitoring (ONE TOUCH ULTRA 2) meter device kit Use to test blood sugar 2 times daily or as directed. 1 kit 0    blood glucose monitoring (ONE TOUCH ULTRASOFT) lancets Use to test blood sugar 2 times daily. 100 each 6    empagliflozin (JARDIANCE) 10 MG TABS tablet Take 1 tablet (10 mg) by mouth daily 90 tablet 3    hydrALAZINE (APRESOLINE) 25 MG tablet Take 1 tablet (25 mg) by mouth 2 times daily 180 tablet 3    insulin glargine (LANTUS PEN) 100 UNIT/ML pen Inject 20-30 Units Subcutaneous At Bedtime Please increase by 1 unit each week until fasting BG is 100 - 150 most days. (Patient taking differently: Inject 24 Units Subcutaneous at bedtime Please increase by 1 unit each week until fasting BG is 100 - 150 most days.) 30 mL 3    insulin pen needle (31G X 5 MM) 31G X 5 MM miscellaneous Use one  pen needles daily or as  directed. 100 each 3    levothyroxine (SYNTHROID/LEVOTHROID) 150 MCG tablet Take 1 tablet (150 mcg) by mouth daily 90 tablet 3    metFORMIN (GLUCOPHAGE XR) 500 MG 24 hr tablet Take 2 tablets (1,000 mg) by mouth 2 times daily (with meals) 360 tablet 3    mycophenolate (GENERIC EQUIVALENT) 250 MG capsule TAKE 3 CAPSULES(750 MG) BY MOUTH TWICE DAILY 540 capsule 3    nitroGLYcerin (NITROSTAT) 0.4 MG sublingual tablet For chest pain place 1 tablet under the tongue every 5 minutes for 3 doses. If symptoms persist 5 minutes after 1st dose call 911. 30 tablet 1    predniSONE (DELTASONE) 5 MG tablet Take 1 tablet (5 mg) by mouth daily 90 tablet 1    ursodiol (ACTIGALL) 250 MG tablet Take 1 tablet (250 mg) by mouth 2 times daily 180 tablet 3    Vitamin D3 (CHOLECALCIFEROL) 25 mcg (1000 units) tablet Take 2 tablets (50 mcg) by mouth daily 180 tablet 3       Family History  family history includes Liver Disease in his brother; Memory loss in his mother.    Social History   reports that he quit smoking about 5 years ago. His smoking use included cigarettes and cigars. He started smoking about 53 years ago. He has a 0.6 pack-year smoking history. He has never used smokeless tobacco. He reports that he does not drink alcohol and does not use drugs.     Past Medical History  Past Medical History:   Diagnosis Date    Anemia     Biliary stricture of transplanted liver (H) 2018    BPH (benign prostatic hyperplasia)     Cancer (H)     hepatocellualr carcinoma    Cirrhosis of liver (H) 2018    Diabetes (H)     Enlarged prostate     Hypothyroidism     Impotence of organic origin 2020    Inguinal hernia     Repaired with mesh on 18    Liver lesion 2018    Liver transplanted (H) 2018     donor liver transplant    Portal vein thrombosis     on path explant    Postoperative atrial fibrillation (H) 2018    Prostate cancer (H)     TB lung, latent     Treated        Past Surgical History:    Procedure Laterality Date    APPENDECTOMY      COLONOSCOPY      2018    CV CORONARY ANGIOGRAM N/A 10/13/2021    Procedure: CV CORONARY ANGIOGRAM;  Surgeon: Yaya Hampton MD;  Location:  [facility]    CV CORONARY LITHOTRIPSY PCI N/A 10/13/2021    Procedure: CV Coronary Lithotripsy PCI;  Surgeon: Yaya Hampton MD;  Location:  [facility]    CV INSTANTANEOUS WAVE-FREE RATIO N/A 10/13/2021    Procedure: Instantaneous Wave-Free Ratio;  Surgeon: Yaya Hampton MD;  Location:  [facility]    CV INTRAVASULAR ULTRASOUND N/A 10/13/2021    Procedure: Intravascular Ultrasound;  Surgeon: Yaya Hampton MD;  Location:  [facility]    CV PCI STENT DRUG ELUTING N/A 10/13/2021    Procedure: Percutaneous Coronary Intervention Stent Drug Eluting;  Surgeon: Yaya Hampton MD;  Location:  [facility]    DAVINCI PROSTATECTOMY N/A 1/3/2020    Procedure: Robot-assisted laparoscopic radical prostatectomy, Bladder biopsy;  Surgeon: Kenrick Casiano MD;  [facility]    ENDOSCOPIC RETROGRADE CHOLANGIOPANCREATOGRAM N/A 12/28/2018    Procedure: ENDOSCOPIC RETROGRADE CHOLANGIOPANCREATOGRAM, with Billary Sphincterotomy and Billary Stent Placement;  Surgeon: Omero Lawler MD;  [facility]    ENDOSCOPIC RETROGRADE CHOLANGIOPANCREATOGRAM  2/18/2019    Procedure: COMBINED ENDOSCOPIC RETROGRADE CHOLANGIOPANCREATOGRAPHY, Bile Duct Stent Exchange;  Surgeon: Omero Lawler MD;  [facility]    ENDOSCOPIC RETROGRADE CHOLANGIOPANCREATOGRAM N/A 4/29/2019    Procedure: ENDOSCOPIC RETROGRADE CHOLANGIOPANCREATOGRAPHY, WITH BILIARY STENT REMOVAL AND STONE EXTRACTION;  Surgeon: Omero Lawler MD;  [facility]    HERNIORRHAPHY INGUINAL  12/22/2018    Procedure: Herniorrhaphy inguinal;  Surgeon: Emil Echevarria MD;  [facility]    IR LIVER BIOPSY  "PERCUTANEOUS  2018    LAPAROTOMY EXPLORATORY      per the patient \"for infection in the LLQ\"     TRANSPLANT LIVER RECIPIENT  DONOR N/A 2018    Procedure: TRANSPLANT LIVER RECIPIENT  DONOR;  Surgeon: Emil Echevarria MD;  [facility]       Physical Exam  BP (!) 167/73   Pulse 65   Wt 86.6 kg (191 lb)   SpO2 96%   BMI 29.91 kg/m     FEET: No ulcers.      RESULTS  Creatinine   Date Value Ref Range Status   2023 0.91 0.67 - 1.17 mg/dL Final   2021 0.79 0.66 - 1.25 mg/dL Final     GFR Estimate   Date Value Ref Range Status   2023 >90 >60 mL/min/1.73m2 Final   2021 >90 >60 mL/min/[1.73_m2] Final     Comment:     Non  GFR Calc  Starting 2018, serum creatinine based estimated GFR (eGFR) will be   calculated using the Chronic Kidney Disease Epidemiology Collaboration   (CKD-EPI) equation.       Hemoglobin A1C   Date Value Ref Range Status   2023 8.8 (H) <5.7 % Final     Comment:     Normal <5.7%   Prediabetes 5.7-6.4%    Diabetes 6.5% or higher     Note: Adopted from ADA consensus guidelines.   2021 9.1 (H) 0 - 5.6 % Final     Comment:     Normal <5.7% Prediabetes 5.7-6.4%  Diabetes 6.5% or higher - adopted from ADA   consensus guidelines.       Potassium   Date Value Ref Range Status   2023 4.7 3.4 - 5.3 mmol/L Final   2022 4.5 3.4 - 5.3 mmol/L Final   2021 4.7 3.4 - 5.3 mmol/L Final     Potassium POCT   Date Value Ref Range Status   2023 5.0 3.4 - 5.3 mmol/L Final     ALT   Date Value Ref Range Status   2023 23 0 - 70 U/L Final     Comment:     Reference intervals for this test were updated on 2023 to more accurately reflect our healthy population. There may be differences in the flagging of prior results with similar values performed with this method. Interpretation of those prior results can be made in the context of the updated reference intervals.     2021 17 0 - 70 U/L Final "     AST   Date Value Ref Range Status   09/08/2023 21 0 - 45 U/L Final     Comment:     Reference intervals for this test were updated on 6/12/2023 to more accurately reflect our healthy population. There may be differences in the flagging of prior results with similar values performed with this method. Interpretation of those prior results can be made in the context of the updated reference intervals.   07/07/2021 11 0 - 45 U/L Final     TSH   Date Value Ref Range Status   09/08/2023 2.17 0.30 - 4.20 uIU/mL Final   07/27/2022 11.01 (H) 0.40 - 4.00 mU/L Final   03/24/2021 9.86 (H) 0.40 - 4.00 mU/L Final     T4 Free   Date Value Ref Range Status   03/24/2021 0.96 0.76 - 1.46 ng/dL Final     Free T4   Date Value Ref Range Status   06/14/2023 1.18 0.90 - 1.70 ng/dL Final       Cholesterol   Date Value Ref Range Status   07/19/2023 153 <200 mg/dL Final   01/25/2022 158 <200 mg/dL Final   09/25/2020 143 <200 mg/dL Final   10/23/2019 126 <200 mg/dL Final     HDL Cholesterol   Date Value Ref Range Status   09/25/2020 38 (L) >39 mg/dL Final   10/23/2019 38 (L) >39 mg/dL Final     Direct Measure HDL   Date Value Ref Range Status   07/19/2023 61 >=40 mg/dL Final   01/25/2022 57 >=40 mg/dL Final     LDL Cholesterol Calculated   Date Value Ref Range Status   07/19/2023 58 <=100 mg/dL Final   01/25/2022 74 <=100 mg/dL Final   09/25/2020 69 <100 mg/dL Final     Comment:     Desirable:       <100 mg/dl   10/23/2019 71 <100 mg/dL Final     Comment:     Desirable:       <100 mg/dl     Triglycerides   Date Value Ref Range Status   07/19/2023 170 (H) <150 mg/dL Final   01/25/2022 136 <150 mg/dL Final   09/25/2020 182 (H) <150 mg/dL Final     Comment:     Borderline high:  150-199 mg/dl  High:             200-499 mg/dl  Very high:       >499 mg/dl     10/23/2019 89 <150 mg/dL Final         ASSESSMENT/PLAN:      DIABETES: Reminded Mr. Limon to take Lantus 20 units subcutaneous at hs,continue Metformin 1000 mg BID and Jardiance 10 mg  each am. He tells me he did not like Trulicity and is not interested in any more injections at this time. Again, I reviewed how Jardiance works and possible side effects of the drug including dehydration, UTI, groin yeast infection, hypoglycemia etc.  Reminded him he has labs ordered.  Pt denies hx of retinopathy.    FOLLOW UP: With me in clinic in 3-4  months and with Dr. Sue Tavares in June 2024.     Time spent reviewing chart, labs and glucose data today = 5 minutes.  Time for clinic visit today = 25 minutes.  Time for documentation today = 15 minutes.    Total time for visit today = 45  minutes.    Gilma Guthrie PA-C

## 2024-02-06 NOTE — PROGRESS NOTES
"HPI  Loy Limon is a 68 year old male with steroid/immunosuppressive induced diabetes.  Clinic visit for diabetes follow up today.    He recently returned from a trip to [place] and reports feeling well.  Pt underwent a liver transplant in Dec 2018 due to cirrhosis/hepatocellular carcinoma.  Pt also has hx of CAD s/p stent, atrial fib,HTN, BPH, hypothyroidism, prostate cancer and TB - lung latent.  For his diabetes, he is prescribed to take Lantus 20 units subcutaneous at hs, Jardiance 10 mg each am and Metformin 1000 mg BID.  He stopped taking Trulicity-  he states \" he did not like the injection \".  He tells me he has been missing his Lantus.  Pt's A1C is 9.2 % today. His A1C was 8.8 % on 7/19/2023.    Pt provided me with a few blood sugar readings which I scanned in his note below.  The am blood sugar readings are actually his postprandial blood sugars and his FBS are high.He has not been taking Lantus at hs.  On ROS today, he reports feeling well today.   Intermittent blurred vision.  Denies n/v, SOB at rest, cough, fever, chest pain, abd pain, diarrhea, melena or blood in stool.  No dysuria or hematuria.  No foot ulcers.    Diabetes Care  Retinopathy: none per pt; last seen in Oct 2022. Reminded him to see Oph.  Nephropathy: Urine protein + in 7/2023. Seen by Nephrology staff. Pt taking Cozaar.  Neuropathy: none.  Foot Exam: no ulcers.  Taking aspirin: no.  Lipids: LDL 58 in 7/2023. Pt taking Lipitor.  Insulin :basal insulin once a day.  DM meds: Metformin and Jardiance.  Pt did NOT like Trulicity.     Testing: glucose meter.          ROS  See under HPI.    Allergies  No Known Allergies    Medications  Current Outpatient Medications   Medication Sig Dispense Refill    amLODIPine (NORVASC) 10 MG tablet Take 1 tablet (10 mg) by mouth daily 90 tablet 1    atorvastatin (LIPITOR) 40 MG tablet Take 1 tablet (40 mg) by mouth daily 90 tablet 3    blood glucose (NO BRAND SPECIFIED) lancets standard Use to test blood " sugar 2 times daily or as directed. 50 lancet 3    blood glucose (NO BRAND SPECIFIED) test strip Use to test blood sugar 2 times daily or as directed. One touch ultra test strips 50 strip 5    blood glucose (ONE TOUCH SURESOFT) lancing device Lancing device to be used with lancets. 1 each 0    blood glucose monitoring (ONE TOUCH ULTRA 2) meter device kit Use to test blood sugar 2 times daily or as directed. 1 kit 0    blood glucose monitoring (ONE TOUCH ULTRASOFT) lancets Use to test blood sugar 2 times daily. 100 each 6    empagliflozin (JARDIANCE) 10 MG TABS tablet Take 1 tablet (10 mg) by mouth daily 90 tablet 3    hydrALAZINE (APRESOLINE) 25 MG tablet Take 1 tablet (25 mg) by mouth 2 times daily 180 tablet 3    insulin glargine (LANTUS PEN) 100 UNIT/ML pen Inject 20-30 Units Subcutaneous At Bedtime Please increase by 1 unit each week until fasting BG is 100 - 150 most days. (Patient taking differently: Inject 24 Units Subcutaneous at bedtime Please increase by 1 unit each week until fasting BG is 100 - 150 most days.) 30 mL 3    insulin pen needle (31G X 5 MM) 31G X 5 MM miscellaneous Use one  pen needles daily or as directed. 100 each 3    levothyroxine (SYNTHROID/LEVOTHROID) 150 MCG tablet Take 1 tablet (150 mcg) by mouth daily 90 tablet 3    metFORMIN (GLUCOPHAGE XR) 500 MG 24 hr tablet Take 2 tablets (1,000 mg) by mouth 2 times daily (with meals) 360 tablet 3    mycophenolate (GENERIC EQUIVALENT) 250 MG capsule TAKE 3 CAPSULES(750 MG) BY MOUTH TWICE DAILY 540 capsule 3    nitroGLYcerin (NITROSTAT) 0.4 MG sublingual tablet For chest pain place 1 tablet under the tongue every 5 minutes for 3 doses. If symptoms persist 5 minutes after 1st dose call 911. 30 tablet 1    predniSONE (DELTASONE) 5 MG tablet Take 1 tablet (5 mg) by mouth daily 90 tablet 1    ursodiol (ACTIGALL) 250 MG tablet Take 1 tablet (250 mg) by mouth 2 times daily 180 tablet 3    Vitamin D3 (CHOLECALCIFEROL) 25 mcg (1000 units) tablet Take 2  tablets (50 mcg) by mouth daily 180 tablet 3       Family History  family history includes Liver Disease in his brother; Memory loss in his mother.    Social History   reports that he quit smoking about 5 years ago. His smoking use included cigarettes and cigars. He started smoking about 53 years ago. He has a 0.6 pack-year smoking history. He has never used smokeless tobacco. He reports that he does not drink alcohol and does not use drugs.     Past Medical History  Past Medical History:   Diagnosis Date    Anemia     Biliary stricture of transplanted liver (H) 2018    BPH (benign prostatic hyperplasia)     Cancer (H)     hepatocellualr carcinoma    Cirrhosis of liver (H) 2018    Diabetes (H)     Enlarged prostate     Hypothyroidism     Impotence of organic origin 2020    Inguinal hernia     Repaired with mesh on 18    Liver lesion 2018    Liver transplanted (H) 2018     donor liver transplant    Portal vein thrombosis     on path explant    Postoperative atrial fibrillation (H) 2018    Prostate cancer (H)     TB lung, latent     Treated        Past Surgical History:   Procedure Laterality Date    APPENDECTOMY      COLONOSCOPY          CV CORONARY ANGIOGRAM N/A 10/13/2021    Procedure: CV CORONARY ANGIOGRAM;  Surgeon: Yaya Hampton MD;  Location:  [facility]    CV CORONARY LITHOTRIPSY PCI N/A 10/13/2021    Procedure: CV Coronary Lithotripsy PCI;  Surgeon: Yaya Hampton MD;  Location:  [facility]    CV INSTANTANEOUS WAVE-FREE RATIO N/A 10/13/2021    Procedure: Instantaneous Wave-Free Ratio;  Surgeon: Yaya Hampton MD;  Location:  [facility]    CV INTRAVASULAR ULTRASOUND N/A 10/13/2021    Procedure: Intravascular Ultrasound;  Surgeon: Yaya Hampton MD;  Location:  [facility]    CV PCI STENT DRUG ELUTING N/A 10/13/2021    Procedure:  "Percutaneous Coronary Intervention Stent Drug Eluting;  Surgeon: Yaya Hampton MD;  [facility]    DAVINCI PROSTATECTOMY N/A 1/3/2020    Procedure: Robot-assisted laparoscopic radical prostatectomy, Bladder biopsy;  Surgeon: Kenrick Casiano MD;  [facility]    ENDOSCOPIC RETROGRADE CHOLANGIOPANCREATOGRAM N/A 2018    Procedure: ENDOSCOPIC RETROGRADE CHOLANGIOPANCREATOGRAM, with Billary Sphincterotomy and Billary Stent Placement;  Surgeon: Omero Lawler MD;  [facility]    ENDOSCOPIC RETROGRADE CHOLANGIOPANCREATOGRAM  2019    Procedure: COMBINED ENDOSCOPIC RETROGRADE CHOLANGIOPANCREATOGRAPHY, Bile Duct Stent Exchange;  Surgeon: Omero Lawler MD;  [facility]    ENDOSCOPIC RETROGRADE CHOLANGIOPANCREATOGRAM N/A 2019    Procedure: ENDOSCOPIC RETROGRADE CHOLANGIOPANCREATOGRAPHY, WITH BILIARY STENT REMOVAL AND STONE EXTRACTION;  Surgeon: Omero Lawler MD;  [facility]    HERNIORRHAPHY INGUINAL  2018    Procedure: Herniorrhaphy inguinal;  Surgeon: Emil Echevarria MD;  [facility]    IR LIVER BIOPSY PERCUTANEOUS  2018    LAPAROTOMY EXPLORATORY      per the patient \"for infection in the LLQ\"     TRANSPLANT LIVER RECIPIENT  DONOR N/A 2018    Procedure: TRANSPLANT LIVER RECIPIENT  DONOR;  Surgeon: Emil Echevarria MD;  [facility]       Physical Exam  BP (!) 167/73   Pulse 65   Wt 86.6 kg (191 lb)   SpO2 96%   BMI 29.91 kg/m     FEET: No ulcers.      RESULTS  Creatinine   Date Value Ref Range Status   2023 0.91 0.67 - 1.17 mg/dL Final   2021 0.79 0.66 - 1.25 mg/dL Final     GFR Estimate   Date Value Ref Range Status   2023 >90 >60 mL/min/1.73m2 Final   2021 >90 >60 mL/min/[1.73_m2] Final     Comment:     Non  GFR Calc  Starting 2018, serum creatinine based estimated GFR (eGFR) will be   calculated using the Chronic " Kidney Disease Epidemiology Collaboration   (CKD-EPI) equation.       Hemoglobin A1C   Date Value Ref Range Status   07/19/2023 8.8 (H) <5.7 % Final     Comment:     Normal <5.7%   Prediabetes 5.7-6.4%    Diabetes 6.5% or higher     Note: Adopted from ADA consensus guidelines.   07/07/2021 9.1 (H) 0 - 5.6 % Final     Comment:     Normal <5.7% Prediabetes 5.7-6.4%  Diabetes 6.5% or higher - adopted from ADA   consensus guidelines.       Potassium   Date Value Ref Range Status   09/08/2023 4.7 3.4 - 5.3 mmol/L Final   07/27/2022 4.5 3.4 - 5.3 mmol/L Final   07/07/2021 4.7 3.4 - 5.3 mmol/L Final     Potassium POCT   Date Value Ref Range Status   07/19/2023 5.0 3.4 - 5.3 mmol/L Final     ALT   Date Value Ref Range Status   09/08/2023 23 0 - 70 U/L Final     Comment:     Reference intervals for this test were updated on 6/12/2023 to more accurately reflect our healthy population. There may be differences in the flagging of prior results with similar values performed with this method. Interpretation of those prior results can be made in the context of the updated reference intervals.     07/07/2021 17 0 - 70 U/L Final     AST   Date Value Ref Range Status   09/08/2023 21 0 - 45 U/L Final     Comment:     Reference intervals for this test were updated on 6/12/2023 to more accurately reflect our healthy population. There may be differences in the flagging of prior results with similar values performed with this method. Interpretation of those prior results can be made in the context of the updated reference intervals.   07/07/2021 11 0 - 45 U/L Final     TSH   Date Value Ref Range Status   09/08/2023 2.17 0.30 - 4.20 uIU/mL Final   07/27/2022 11.01 (H) 0.40 - 4.00 mU/L Final   03/24/2021 9.86 (H) 0.40 - 4.00 mU/L Final     T4 Free   Date Value Ref Range Status   03/24/2021 0.96 0.76 - 1.46 ng/dL Final     Free T4   Date Value Ref Range Status   06/14/2023 1.18 0.90 - 1.70 ng/dL Final       Cholesterol   Date Value Ref Range  Status   07/19/2023 153 <200 mg/dL Final   01/25/2022 158 <200 mg/dL Final   09/25/2020 143 <200 mg/dL Final   10/23/2019 126 <200 mg/dL Final     HDL Cholesterol   Date Value Ref Range Status   09/25/2020 38 (L) >39 mg/dL Final   10/23/2019 38 (L) >39 mg/dL Final     Direct Measure HDL   Date Value Ref Range Status   07/19/2023 61 >=40 mg/dL Final   01/25/2022 57 >=40 mg/dL Final     LDL Cholesterol Calculated   Date Value Ref Range Status   07/19/2023 58 <=100 mg/dL Final   01/25/2022 74 <=100 mg/dL Final   09/25/2020 69 <100 mg/dL Final     Comment:     Desirable:       <100 mg/dl   10/23/2019 71 <100 mg/dL Final     Comment:     Desirable:       <100 mg/dl     Triglycerides   Date Value Ref Range Status   07/19/2023 170 (H) <150 mg/dL Final   01/25/2022 136 <150 mg/dL Final   09/25/2020 182 (H) <150 mg/dL Final     Comment:     Borderline high:  150-199 mg/dl  High:             200-499 mg/dl  Very high:       >499 mg/dl     10/23/2019 89 <150 mg/dL Final         ASSESSMENT/PLAN:      DIABETES: Reminded Mr. Limon to take Lantus 20 units subcutaneous at hs,continue Metformin 1000 mg BID and Jardiance 10 mg each am. He tells me he did not like Trulicity and is not interested in any more injections at this time. Again, I reviewed how Jardiance works and possible side effects of the drug including dehydration, UTI, groin yeast infection, hypoglycemia etc.  Reminded him he has labs ordered.  Pt denies hx of retinopathy.    FOLLOW UP: With me in clinic in 3-4  months and with Dr. Sue Tavares in June 2024.     Time spent reviewing chart, labs and glucose data today = 5 minutes.  Time for clinic visit today = 25 minutes.  Time for documentation today = 15 minutes.    Total time for visit today = 45  minutes.    Gilma Guthrie PA-C

## 2024-02-07 ENCOUNTER — OFFICE VISIT (OUTPATIENT)
Dept: NEUROLOGY | Facility: CLINIC | Age: 69
End: 2024-02-07
Payer: COMMERCIAL

## 2024-02-07 VITALS
OXYGEN SATURATION: 96 % | DIASTOLIC BLOOD PRESSURE: 66 MMHG | HEART RATE: 63 BPM | RESPIRATION RATE: 16 BRPM | SYSTOLIC BLOOD PRESSURE: 142 MMHG

## 2024-02-07 DIAGNOSIS — R41.3 MEMORY LOSS: Primary | ICD-10-CM

## 2024-02-07 PROCEDURE — 99213 OFFICE O/P EST LOW 20 MIN: CPT | Performed by: PSYCHIATRY & NEUROLOGY

## 2024-02-07 ASSESSMENT — PAIN SCALES - GENERAL: PAINLEVEL: MILD PAIN (3)

## 2024-02-07 NOTE — PROGRESS NOTES
[facility] Neurology Follow Up Visit    Loy Limon MRN# [id number]   Age: 68 year old YOB: 1955     Brief history of symptoms: The patient was initially seen in neurologic consultation on 6/14/2023 for evaluation of memory concerns. Please see the comprehensive neurologic consultation notes from those dates in the Epic records for details.     At our visit the patient had a MiniCOG of 2/5, as well as findings of polyneuropathy.  His deficits in cognition were said/reported as subacute to acute in onset.  Overall concern was for a mixed cognitive impairment given his alcohol abuse history, smoking history, poor control of diabetes, NILDA and prior hepatocellular carcinoma requiring transplant with need of immunosuppression.  He was to obtain a MoCA with OT, MRI brain, NILDA valuation with sleep providers, Hx of A-fib with CAD s/p stent, TB history, and an MTM consultation for polypharmacy issues.      Interval history:   - No imaging was done  - No OT visits/evaluation occurred  - Serum studies were normal 6/14/2023 (Ammonia, TSH elevated at 4.38 (similar elevations in the past), B12, B1, MMA, Homocysteine, RPR.    Today, the patient was 30 minutes late to his appointment.  He was unaware he didn't do the majority of his recommended testing. He isn't having new symptoms to report at this time. He still feels he has cognitive issues, but these are unchanged from out last visit.  He feels his overall health is better, and his pain is improved. He does have some waist/hip related pain.     Physical Exam:   Vitals: BP (!) 142/66   Pulse 63   Resp 16   SpO2 96%    General: Seated comfortably in no acute distress.  HEENT: Neck supple with normal range of motion.   Neurologic:     Mental Status: Fully alert, attentive and oriented today. Speech clear and fluent, no paraphasic errors.     Cranial Nerves: EOMI with normal smooth pursuit. Facial movements symmetric. Hearing not formally tested but intact to  conversation.  No dysarthria.     Motor: No tremors or other abnormal movements observed.          Assessment and Plan:   Assessment:  - Suspect cognitive impairment of a mixed type  - Polyneuropathy is present, likely from diabetes and alcohol abuse history    The patient was not able to obtain an image or his cognitive evaluations, which limits our visit today.  We spent considerable time today going over why these tests are important for figuring out his issues of cognition, and I provided him with help in scheduling these tests prior to him leaving.       Plan:  - MRI brain w/wout contrast  - OT for MoCA or SLUM (in Kyrgyz) and a CPT    Follow up in Neurology clinic in 3-6 months (virtual) or earlier as needed should new concerns arise.    KARINA Kaminski D.O.   of Neurology    Total time today (25 min) in this patient encounter was spent on pre-charting, counseling and/or coordination of care.

## 2024-02-07 NOTE — LETTER
2/7/2024       RE: Loy Limon  [address]  [facility] [address]     Dear Colleague,    Thank you for referring your patient, Loy Limon, to the [facility] NEUROLOGY [facility]. Please see a copy of my visit note below.    [facility] Neurology Follow Up Visit    Loy Limon MRN# [id number]   Age: 68 year old YOB: 1955     Brief history of symptoms: The patient was initially seen in neurologic consultation on 6/14/2023 for evaluation of memory concerns. Please see the comprehensive neurologic consultation notes from those dates in the Epic records for details.     At our visit the patient had a MiniCOG of 2/5, as well as findings of polyneuropathy.  His deficits in cognition were said/reported as subacute to acute in onset.  Overall concern was for a mixed cognitive impairment given his alcohol abuse history, smoking history, poor control of diabetes, NILDA and prior hepatocellular carcinoma requiring transplant with need of immunosuppression.  He was to obtain a MoCA with OT, MRI brain, NILDA valuation with sleep providers, Hx of A-fib with CAD s/p stent, TB history, and an MTM consultation for polypharmacy issues.      Interval history:   - No imaging was done  - No OT visits/evaluation occurred  - Serum studies were normal 6/14/2023 (Ammonia, TSH elevated at 4.38 (similar elevations in the past), B12, B1, MMA, Homocysteine, RPR.    Today, the patient was 30 minutes late to his appointment.  He was unaware he didn't do the majority of his recommended testing. He isn't having new symptoms to report at this time. He still feels he has cognitive issues, but these are unchanged from out last visit.  He feels his overall health is better, and his pain is improved. He does have some waist/hip related pain.     Physical Exam:   Vitals: BP (!) 142/66   Pulse 63   Resp 16   SpO2 96%    General: Seated comfortably in no  acute distress.  HEENT: Neck supple with normal range of motion.   Neurologic:     Mental Status: Fully alert, attentive and oriented today. Speech clear and fluent, no paraphasic errors.     Cranial Nerves: EOMI with normal smooth pursuit. Facial movements symmetric. Hearing not formally tested but intact to conversation.  No dysarthria.     Motor: No tremors or other abnormal movements observed.          Assessment and Plan:   Assessment:  - Suspect cognitive impairment of a mixed type  - Polyneuropathy is present, likely from diabetes and alcohol abuse history    The patient was not able to obtain an image or his cognitive evaluations, which limits our visit today.  We spent considerable time today going over why these tests are important for figuring out his issues of cognition, and I provided him with help in scheduling these tests prior to him leaving.       Plan:  - MRI brain w/wout contrast  - OT for MoCA or SLUM (in Urdu) and a CPT    Follow up in Neurology clinic in 3-6 months (virtual) or earlier as needed should new concerns arise.        Total time today (25 min) in this patient encounter was spent on pre-charting, counseling and/or coordination of care.       Again, thank you for allowing me to participate in the care of your patient.      Sincerely,    Blaze Kaminski, DO

## 2024-02-07 NOTE — PATIENT INSTRUCTIONS
I would still like you to obtain a few tests to better evaluate your cognitive issues:    Please obtain an MRI brain w/wout contrast  Please obtain a Cognitive performance test and a MoCA or SLUMS with an occupational therapist    I will be happy to see you again once these tests are completed.

## 2024-02-09 ENCOUNTER — ANCILLARY PROCEDURE (OUTPATIENT)
Dept: CARDIOLOGY | Facility: CLINIC | Age: 69
End: 2024-02-09
Attending: STUDENT IN AN ORGANIZED HEALTH CARE EDUCATION/TRAINING PROGRAM
Payer: COMMERCIAL

## 2024-02-09 DIAGNOSIS — I10 ESSENTIAL HYPERTENSION: ICD-10-CM

## 2024-02-09 DIAGNOSIS — I25.118 CORONARY ARTERY DISEASE OF NATIVE ARTERY OF NATIVE HEART WITH STABLE ANGINA PECTORIS (H): ICD-10-CM

## 2024-02-09 LAB — LVEF ECHO: NORMAL

## 2024-02-09 PROCEDURE — 93306 TTE W/DOPPLER COMPLETE: CPT | Performed by: INTERNAL MEDICINE

## 2024-02-15 ENCOUNTER — DOCUMENTATION ONLY (OUTPATIENT)
Dept: TRANSPLANT | Facility: CLINIC | Age: 69
End: 2024-02-15
Payer: COMMERCIAL

## 2024-02-15 ASSESSMENT — ENCOUNTER SYMPTOMS: NEW SYMPTOMS OF CORONARY ARTERY DISEASE: 0

## 2024-02-28 ENCOUNTER — HOSPITAL ENCOUNTER (INPATIENT)
Facility: CLINIC | Age: 69
LOS: 10 days | Discharge: HOME | DRG: 193 | End: 2024-03-09
Attending: EMERGENCY MEDICINE | Admitting: INTERNAL MEDICINE
Payer: COMMERCIAL

## 2024-02-28 ENCOUNTER — APPOINTMENT (OUTPATIENT)
Dept: CT IMAGING | Facility: CLINIC | Age: 69
DRG: 193 | End: 2024-02-28
Attending: EMERGENCY MEDICINE
Payer: COMMERCIAL

## 2024-02-28 ENCOUNTER — APPOINTMENT (OUTPATIENT)
Dept: GENERAL RADIOLOGY | Facility: CLINIC | Age: 69
DRG: 193 | End: 2024-02-28
Attending: EMERGENCY MEDICINE
Payer: COMMERCIAL

## 2024-02-28 DIAGNOSIS — Z16.12 URINARY TRACT INFECTION DUE TO EXTENDED-SPECTRUM BETA LACTAMASE (ESBL) PRODUCING ESCHERICHIA COLI: ICD-10-CM

## 2024-02-28 DIAGNOSIS — E11.9 DM TYPE 2, GOAL HBA1C 7%-8% (H): ICD-10-CM

## 2024-02-28 DIAGNOSIS — E55.9 VITAMIN D DEFICIENCY: ICD-10-CM

## 2024-02-28 DIAGNOSIS — Z94.4 LIVER TRANSPLANTED (H): ICD-10-CM

## 2024-02-28 DIAGNOSIS — N17.9 AKI (ACUTE KIDNEY INJURY) (H): ICD-10-CM

## 2024-02-28 DIAGNOSIS — N39.0 URINARY TRACT INFECTION DUE TO EXTENDED-SPECTRUM BETA LACTAMASE (ESBL) PRODUCING ESCHERICHIA COLI: ICD-10-CM

## 2024-02-28 DIAGNOSIS — J18.9 COMMUNITY ACQUIRED PNEUMONIA OF LEFT LOWER LOBE OF LUNG: ICD-10-CM

## 2024-02-28 DIAGNOSIS — N10 PYELONEPHRITIS, ACUTE: ICD-10-CM

## 2024-02-28 DIAGNOSIS — E11.65 UNCONTROLLED TYPE 2 DIABETES MELLITUS WITH HYPERGLYCEMIA (H): ICD-10-CM

## 2024-02-28 DIAGNOSIS — Z94.4 LIVER REPLACED BY TRANSPLANT (H): ICD-10-CM

## 2024-02-28 DIAGNOSIS — R10.9 RIGHT SIDED ABDOMINAL PAIN: ICD-10-CM

## 2024-02-28 DIAGNOSIS — R50.9 FEVER IN ADULT: ICD-10-CM

## 2024-02-28 DIAGNOSIS — Z87.891 HISTORY OF TOBACCO ABUSE: ICD-10-CM

## 2024-02-28 DIAGNOSIS — N39.0 URINARY TRACT INFECTION WITHOUT HEMATURIA, SITE UNSPECIFIED: Primary | ICD-10-CM

## 2024-02-28 DIAGNOSIS — B96.29 URINARY TRACT INFECTION DUE TO EXTENDED-SPECTRUM BETA LACTAMASE (ESBL) PRODUCING ESCHERICHIA COLI: ICD-10-CM

## 2024-02-28 DIAGNOSIS — I25.118 CORONARY ARTERY DISEASE OF NATIVE ARTERY OF NATIVE HEART WITH STABLE ANGINA PECTORIS (H): ICD-10-CM

## 2024-02-28 DIAGNOSIS — Z94.4 TRANSPLANTED LIVER (H): ICD-10-CM

## 2024-02-28 DIAGNOSIS — N39.0 ACUTE UTI: ICD-10-CM

## 2024-02-28 LAB
ALBUMIN SERPL BCG-MCNC: 3.1 G/DL (ref 3.5–5.2)
ALBUMIN UR-MCNC: 100 MG/DL
ALP SERPL-CCNC: 311 U/L (ref 40–150)
ALT SERPL W P-5'-P-CCNC: 40 U/L (ref 0–70)
ANION GAP SERPL CALCULATED.3IONS-SCNC: 13 MMOL/L (ref 7–15)
APPEARANCE UR: CLEAR
AST SERPL W P-5'-P-CCNC: 58 U/L (ref 0–45)
BACTERIA #/AREA URNS HPF: ABNORMAL /HPF
BASOPHILS # BLD AUTO: 0 10E3/UL (ref 0–0.2)
BASOPHILS NFR BLD AUTO: 0 %
BILIRUB SERPL-MCNC: 0.5 MG/DL
BILIRUB UR QL STRIP: NEGATIVE
BUN SERPL-MCNC: 25 MG/DL (ref 8–23)
CALCIUM SERPL-MCNC: 8.7 MG/DL (ref 8.8–10.2)
CHLORIDE SERPL-SCNC: 98 MMOL/L (ref 98–107)
COLOR UR AUTO: ABNORMAL
CREAT SERPL-MCNC: 1.26 MG/DL (ref 0.67–1.17)
DEPRECATED HCO3 PLAS-SCNC: 20 MMOL/L (ref 22–29)
EGFRCR SERPLBLD CKD-EPI 2021: 61 ML/MIN/1.73M2
EOSINOPHIL # BLD AUTO: 0 10E3/UL (ref 0–0.7)
EOSINOPHIL NFR BLD AUTO: 0 %
ERYTHROCYTE [DISTWIDTH] IN BLOOD BY AUTOMATED COUNT: 13.6 % (ref 10–15)
FLUAV RNA SPEC QL NAA+PROBE: NEGATIVE
FLUBV RNA RESP QL NAA+PROBE: NEGATIVE
GLUCOSE SERPL-MCNC: 180 MG/DL (ref 70–99)
GLUCOSE UR STRIP-MCNC: >=1000 MG/DL
HCT VFR BLD AUTO: 40.6 % (ref 40–53)
HGB BLD-MCNC: 13.6 G/DL (ref 13.3–17.7)
HGB UR QL STRIP: ABNORMAL
IMM GRANULOCYTES # BLD: 0 10E3/UL
IMM GRANULOCYTES NFR BLD: 0 %
KETONES UR STRIP-MCNC: NEGATIVE MG/DL
LACTATE SERPL-SCNC: 1.6 MMOL/L (ref 0.7–2)
LEUKOCYTE ESTERASE UR QL STRIP: ABNORMAL
LYMPHOCYTES # BLD AUTO: 0.9 10E3/UL (ref 0.8–5.3)
LYMPHOCYTES NFR BLD AUTO: 10 %
MCH RBC QN AUTO: 28 PG (ref 26.5–33)
MCHC RBC AUTO-ENTMCNC: 33.5 G/DL (ref 31.5–36.5)
MCV RBC AUTO: 84 FL (ref 78–100)
MONOCYTES # BLD AUTO: 0.6 10E3/UL (ref 0–1.3)
MONOCYTES NFR BLD AUTO: 7 %
NEUTROPHILS # BLD AUTO: 7.3 10E3/UL (ref 1.6–8.3)
NEUTROPHILS NFR BLD AUTO: 83 %
NITRATE UR QL: NEGATIVE
NRBC # BLD AUTO: 0 10E3/UL
NRBC BLD AUTO-RTO: 0 /100
PH UR STRIP: 5.5 [PH] (ref 5–7)
PLATELET # BLD AUTO: 233 10E3/UL (ref 150–450)
POTASSIUM SERPL-SCNC: 4 MMOL/L (ref 3.4–5.3)
PROT SERPL-MCNC: 6.7 G/DL (ref 6.4–8.3)
RBC # BLD AUTO: 4.85 10E6/UL (ref 4.4–5.9)
RBC URINE: 4 /HPF
RSV RNA SPEC NAA+PROBE: NEGATIVE
SARS-COV-2 RNA RESP QL NAA+PROBE: NEGATIVE
SODIUM SERPL-SCNC: 131 MMOL/L (ref 135–145)
SP GR UR STRIP: 1.01 (ref 1–1.03)
SQUAMOUS EPITHELIAL: <1 /HPF
TRANSITIONAL EPI: <1 /HPF
TROPONIN T SERPL HS-MCNC: 45 NG/L
UROBILINOGEN UR STRIP-MCNC: NORMAL MG/DL
WBC # BLD AUTO: 8.9 10E3/UL (ref 4–11)
WBC URINE: 49 /HPF

## 2024-02-28 PROCEDURE — 99222 1ST HOSP IP/OBS MODERATE 55: CPT | Mod: GC | Performed by: INTERNAL MEDICINE

## 2024-02-28 PROCEDURE — 71046 X-RAY EXAM CHEST 2 VIEWS: CPT | Mod: 26 | Performed by: RADIOLOGY

## 2024-02-28 PROCEDURE — 81001 URINALYSIS AUTO W/SCOPE: CPT | Performed by: EMERGENCY MEDICINE

## 2024-02-28 PROCEDURE — 250N000011 HC RX IP 250 OP 636: Performed by: EMERGENCY MEDICINE

## 2024-02-28 PROCEDURE — 80053 COMPREHEN METABOLIC PANEL: CPT | Performed by: EMERGENCY MEDICINE

## 2024-02-28 PROCEDURE — 93010 ELECTROCARDIOGRAM REPORT: CPT | Performed by: EMERGENCY MEDICINE

## 2024-02-28 PROCEDURE — 120N000002 HC R&B MED SURG/OB UMMC

## 2024-02-28 PROCEDURE — 82565 ASSAY OF CREATININE: CPT

## 2024-02-28 PROCEDURE — 74177 CT ABD & PELVIS W/CONTRAST: CPT | Mod: 26 | Performed by: RADIOLOGY

## 2024-02-28 PROCEDURE — 36415 COLL VENOUS BLD VENIPUNCTURE: CPT | Performed by: EMERGENCY MEDICINE

## 2024-02-28 PROCEDURE — 93005 ELECTROCARDIOGRAM TRACING: CPT | Performed by: EMERGENCY MEDICINE

## 2024-02-28 PROCEDURE — 99285 EMERGENCY DEPT VISIT HI MDM: CPT | Mod: 25 | Performed by: EMERGENCY MEDICINE

## 2024-02-28 PROCEDURE — 87040 BLOOD CULTURE FOR BACTERIA: CPT | Performed by: EMERGENCY MEDICINE

## 2024-02-28 PROCEDURE — 74177 CT ABD & PELVIS W/CONTRAST: CPT

## 2024-02-28 PROCEDURE — 258N000003 HC RX IP 258 OP 636: Performed by: EMERGENCY MEDICINE

## 2024-02-28 PROCEDURE — 83605 ASSAY OF LACTIC ACID: CPT | Performed by: EMERGENCY MEDICINE

## 2024-02-28 PROCEDURE — 84145 PROCALCITONIN (PCT): CPT

## 2024-02-28 PROCEDURE — 87637 SARSCOV2&INF A&B&RSV AMP PRB: CPT | Performed by: EMERGENCY MEDICINE

## 2024-02-28 PROCEDURE — 87086 URINE CULTURE/COLONY COUNT: CPT | Performed by: EMERGENCY MEDICINE

## 2024-02-28 PROCEDURE — 87186 SC STD MICRODIL/AGAR DIL: CPT | Performed by: EMERGENCY MEDICINE

## 2024-02-28 PROCEDURE — 84484 ASSAY OF TROPONIN QUANT: CPT | Performed by: EMERGENCY MEDICINE

## 2024-02-28 PROCEDURE — 71046 X-RAY EXAM CHEST 2 VIEWS: CPT

## 2024-02-28 PROCEDURE — 85025 COMPLETE CBC W/AUTO DIFF WBC: CPT | Performed by: EMERGENCY MEDICINE

## 2024-02-28 RX ORDER — KETOROLAC TROMETHAMINE 15 MG/ML
10 INJECTION, SOLUTION INTRAMUSCULAR; INTRAVENOUS ONCE
Status: COMPLETED | OUTPATIENT
Start: 2024-02-28 | End: 2024-02-28

## 2024-02-28 RX ORDER — HYDRALAZINE HYDROCHLORIDE 25 MG/1
25 TABLET, FILM COATED ORAL 2 TIMES DAILY
Status: DISCONTINUED | OUTPATIENT
Start: 2024-02-28 | End: 2024-03-07

## 2024-02-28 RX ORDER — AMLODIPINE BESYLATE 5 MG/1
10 TABLET ORAL DAILY
Status: DISCONTINUED | OUTPATIENT
Start: 2024-02-29 | End: 2024-03-02

## 2024-02-28 RX ORDER — IOPAMIDOL 755 MG/ML
118 INJECTION, SOLUTION INTRAVASCULAR ONCE
Status: COMPLETED | OUTPATIENT
Start: 2024-02-28 | End: 2024-02-28

## 2024-02-28 RX ORDER — MYCOPHENOLATE MOFETIL 250 MG/1
750 CAPSULE ORAL
Status: DISCONTINUED | OUTPATIENT
Start: 2024-02-29 | End: 2024-02-29

## 2024-02-28 RX ORDER — PREDNISONE 5 MG/1
5 TABLET ORAL DAILY
Status: DISCONTINUED | OUTPATIENT
Start: 2024-02-29 | End: 2024-03-09 | Stop reason: HOSPADM

## 2024-02-28 RX ORDER — CEFTRIAXONE 1 G/1
1 INJECTION, POWDER, FOR SOLUTION INTRAMUSCULAR; INTRAVENOUS ONCE
Status: COMPLETED | OUTPATIENT
Start: 2024-02-28 | End: 2024-02-28

## 2024-02-28 RX ORDER — METFORMIN HCL 500 MG
1000 TABLET, EXTENDED RELEASE 24 HR ORAL 2 TIMES DAILY WITH MEALS
Status: DISCONTINUED | OUTPATIENT
Start: 2024-02-29 | End: 2024-03-02

## 2024-02-28 RX ORDER — ATORVASTATIN CALCIUM 40 MG/1
40 TABLET, FILM COATED ORAL DAILY
Status: DISCONTINUED | OUTPATIENT
Start: 2024-02-29 | End: 2024-03-09 | Stop reason: HOSPADM

## 2024-02-28 RX ORDER — URSODIOL 250 MG/1
250 TABLET, FILM COATED ORAL 2 TIMES DAILY
Status: DISCONTINUED | OUTPATIENT
Start: 2024-02-28 | End: 2024-03-09 | Stop reason: HOSPADM

## 2024-02-28 RX ORDER — VITAMIN B COMPLEX
50 TABLET ORAL DAILY
Status: DISCONTINUED | OUTPATIENT
Start: 2024-02-29 | End: 2024-03-09 | Stop reason: HOSPADM

## 2024-02-28 RX ADMIN — IOPAMIDOL 118 ML: 755 INJECTION, SOLUTION INTRAVENOUS at 21:55

## 2024-02-28 RX ADMIN — KETOROLAC TROMETHAMINE 10 MG: 15 INJECTION, SOLUTION INTRAMUSCULAR; INTRAVENOUS at 21:23

## 2024-02-28 RX ADMIN — CEFTRIAXONE SODIUM 1 G: 1 INJECTION, POWDER, FOR SOLUTION INTRAMUSCULAR; INTRAVENOUS at 21:44

## 2024-02-28 RX ADMIN — SODIUM CHLORIDE 2598 ML: 9 INJECTION, SOLUTION INTRAVENOUS at 21:28

## 2024-02-28 RX ADMIN — AZITHROMYCIN MONOHYDRATE 500 MG: 500 INJECTION, POWDER, LYOPHILIZED, FOR SOLUTION INTRAVENOUS at 23:27

## 2024-02-28 ASSESSMENT — ACTIVITIES OF DAILY LIVING (ADL)
ADLS_ACUITY_SCORE: 37
ADLS_ACUITY_SCORE: 35

## 2024-02-28 ASSESSMENT — COLUMBIA-SUICIDE SEVERITY RATING SCALE - C-SSRS
6. HAVE YOU EVER DONE ANYTHING, STARTED TO DO ANYTHING, OR PREPARED TO DO ANYTHING TO END YOUR LIFE?: NO
2. HAVE YOU ACTUALLY HAD ANY THOUGHTS OF KILLING YOURSELF IN THE PAST MONTH?: NO
1. IN THE PAST MONTH, HAVE YOU WISHED YOU WERE DEAD OR WISHED YOU COULD GO TO SLEEP AND NOT WAKE UP?: NO

## 2024-02-29 ENCOUNTER — APPOINTMENT (OUTPATIENT)
Dept: INTERPRETER SERVICES | Facility: CLINIC | Age: 69
DRG: 193 | End: 2024-02-29
Payer: COMMERCIAL

## 2024-02-29 LAB
ALBUMIN SERPL BCG-MCNC: 2.7 G/DL (ref 3.5–5.2)
ALP SERPL-CCNC: 246 U/L (ref 40–150)
ALT SERPL W P-5'-P-CCNC: 29 U/L (ref 0–70)
ANION GAP SERPL CALCULATED.3IONS-SCNC: 11 MMOL/L (ref 7–15)
APTT PPP: 43 SECONDS (ref 22–38)
AST SERPL W P-5'-P-CCNC: 44 U/L (ref 0–45)
ATRIAL RATE - MUSE: 159 BPM
BACTERIA UR CULT: ABNORMAL
BASOPHILS # BLD AUTO: 0 10E3/UL (ref 0–0.2)
BASOPHILS NFR BLD AUTO: 0 %
BILIRUB DIRECT SERPL-MCNC: <0.2 MG/DL (ref 0–0.3)
BILIRUB SERPL-MCNC: 0.3 MG/DL
BUN SERPL-MCNC: 22.4 MG/DL (ref 8–23)
C PNEUM DNA SPEC QL NAA+PROBE: NOT DETECTED
CALCIUM SERPL-MCNC: 8.3 MG/DL (ref 8.8–10.2)
CHLORIDE SERPL-SCNC: 105 MMOL/L (ref 98–107)
CREAT SERPL-MCNC: 1.14 MG/DL (ref 0.67–1.17)
CREAT SERPL-MCNC: 1.22 MG/DL (ref 0.67–1.17)
DEPRECATED HCO3 PLAS-SCNC: 19 MMOL/L (ref 22–29)
DIASTOLIC BLOOD PRESSURE - MUSE: NORMAL MMHG
EGFRCR SERPLBLD CKD-EPI 2021: 64 ML/MIN/1.73M2
EGFRCR SERPLBLD CKD-EPI 2021: 69 ML/MIN/1.73M2
EOSINOPHIL # BLD AUTO: 0 10E3/UL (ref 0–0.7)
EOSINOPHIL NFR BLD AUTO: 0 %
ERYTHROCYTE [DISTWIDTH] IN BLOOD BY AUTOMATED COUNT: 13.7 % (ref 10–15)
FLUAV H1 2009 PAND RNA SPEC QL NAA+PROBE: NOT DETECTED
FLUAV H1 RNA SPEC QL NAA+PROBE: NOT DETECTED
FLUAV H3 RNA SPEC QL NAA+PROBE: NOT DETECTED
FLUAV RNA SPEC QL NAA+PROBE: NOT DETECTED
FLUBV RNA SPEC QL NAA+PROBE: NOT DETECTED
GLUCOSE BLDC GLUCOMTR-MCNC: 172 MG/DL (ref 70–99)
GLUCOSE BLDC GLUCOMTR-MCNC: 176 MG/DL (ref 70–99)
GLUCOSE BLDC GLUCOMTR-MCNC: 228 MG/DL (ref 70–99)
GLUCOSE BLDC GLUCOMTR-MCNC: 245 MG/DL (ref 70–99)
GLUCOSE SERPL-MCNC: 172 MG/DL (ref 70–99)
HADV DNA SPEC QL NAA+PROBE: NOT DETECTED
HCOV PNL SPEC NAA+PROBE: NOT DETECTED
HCT VFR BLD AUTO: 38.2 % (ref 40–53)
HGB BLD-MCNC: 12.5 G/DL (ref 13.3–17.7)
HMPV RNA SPEC QL NAA+PROBE: NOT DETECTED
HPIV1 RNA SPEC QL NAA+PROBE: NOT DETECTED
HPIV2 RNA SPEC QL NAA+PROBE: NOT DETECTED
HPIV3 RNA SPEC QL NAA+PROBE: NOT DETECTED
HPIV4 RNA SPEC QL NAA+PROBE: NOT DETECTED
IMM GRANULOCYTES # BLD: 0 10E3/UL
IMM GRANULOCYTES NFR BLD: 0 %
INR PPP: 1.08 (ref 0.85–1.15)
INTERPRETATION ECG - MUSE: NORMAL
L PNEUMO1 AG UR QL IA: NEGATIVE
LYMPHOCYTES # BLD AUTO: 1.1 10E3/UL (ref 0.8–5.3)
LYMPHOCYTES NFR BLD AUTO: 15 %
M PNEUMO DNA SPEC QL NAA+PROBE: NOT DETECTED
MCH RBC QN AUTO: 28 PG (ref 26.5–33)
MCHC RBC AUTO-ENTMCNC: 32.7 G/DL (ref 31.5–36.5)
MCV RBC AUTO: 86 FL (ref 78–100)
MONOCYTES # BLD AUTO: 0.7 10E3/UL (ref 0–1.3)
MONOCYTES NFR BLD AUTO: 9 %
MRSA DNA SPEC QL NAA+PROBE: NEGATIVE
NEUTROPHILS # BLD AUTO: 6 10E3/UL (ref 1.6–8.3)
NEUTROPHILS NFR BLD AUTO: 76 %
NRBC # BLD AUTO: 0 10E3/UL
NRBC BLD AUTO-RTO: 0 /100
P AXIS - MUSE: NORMAL DEGREES
PLATELET # BLD AUTO: 221 10E3/UL (ref 150–450)
POTASSIUM SERPL-SCNC: 3.8 MMOL/L (ref 3.4–5.3)
PR INTERVAL - MUSE: NORMAL MS
PROCALCITONIN SERPL IA-MCNC: 5.83 NG/ML
PROT SERPL-MCNC: 5.8 G/DL (ref 6.4–8.3)
QRS DURATION - MUSE: 88 MS
QT - MUSE: 312 MS
QTC - MUSE: 455 MS
R AXIS - MUSE: -23 DEGREES
RBC # BLD AUTO: 4.46 10E6/UL (ref 4.4–5.9)
RSV RNA SPEC QL NAA+PROBE: NOT DETECTED
RSV RNA SPEC QL NAA+PROBE: NOT DETECTED
RV+EV RNA SPEC QL NAA+PROBE: NOT DETECTED
S PNEUM AG SPEC QL: NEGATIVE
SA TARGET DNA: NEGATIVE
SODIUM SERPL-SCNC: 135 MMOL/L (ref 135–145)
SYSTOLIC BLOOD PRESSURE - MUSE: NORMAL MMHG
T AXIS - MUSE: 121 DEGREES
TROPONIN T SERPL HS-MCNC: 43 NG/L
VENTRICULAR RATE- MUSE: 128 BPM
WBC # BLD AUTO: 7.9 10E3/UL (ref 4–11)

## 2024-02-29 PROCEDURE — 250N000012 HC RX MED GY IP 250 OP 636 PS 637: Performed by: INTERNAL MEDICINE

## 2024-02-29 PROCEDURE — 36415 COLL VENOUS BLD VENIPUNCTURE: CPT

## 2024-02-29 PROCEDURE — 250N000013 HC RX MED GY IP 250 OP 250 PS 637

## 2024-02-29 PROCEDURE — 99222 1ST HOSP IP/OBS MODERATE 55: CPT | Mod: GC | Performed by: INTERNAL MEDICINE

## 2024-02-29 PROCEDURE — 82374 ASSAY BLOOD CARBON DIOXIDE: CPT

## 2024-02-29 PROCEDURE — 87641 MR-STAPH DNA AMP PROBE: CPT

## 2024-02-29 PROCEDURE — 250N000011 HC RX IP 250 OP 636

## 2024-02-29 PROCEDURE — 87899 AGENT NOS ASSAY W/OPTIC: CPT

## 2024-02-29 PROCEDURE — 87205 SMEAR GRAM STAIN: CPT

## 2024-02-29 PROCEDURE — 84155 ASSAY OF PROTEIN SERUM: CPT

## 2024-02-29 PROCEDURE — 82565 ASSAY OF CREATININE: CPT

## 2024-02-29 PROCEDURE — 85025 COMPLETE CBC W/AUTO DIFF WBC: CPT

## 2024-02-29 PROCEDURE — 85610 PROTHROMBIN TIME: CPT

## 2024-02-29 PROCEDURE — 82962 GLUCOSE BLOOD TEST: CPT

## 2024-02-29 PROCEDURE — 99232 SBSQ HOSP IP/OBS MODERATE 35: CPT | Mod: GC | Performed by: INTERNAL MEDICINE

## 2024-02-29 PROCEDURE — 250N000009 HC RX 250

## 2024-02-29 PROCEDURE — 87640 STAPH A DNA AMP PROBE: CPT

## 2024-02-29 PROCEDURE — 250N000012 HC RX MED GY IP 250 OP 636 PS 637

## 2024-02-29 PROCEDURE — 85730 THROMBOPLASTIN TIME PARTIAL: CPT

## 2024-02-29 PROCEDURE — 87581 M.PNEUMON DNA AMP PROBE: CPT

## 2024-02-29 PROCEDURE — 250N000012 HC RX MED GY IP 250 OP 636 PS 637: Performed by: STUDENT IN AN ORGANIZED HEALTH CARE EDUCATION/TRAINING PROGRAM

## 2024-02-29 PROCEDURE — 120N000002 HC R&B MED SURG/OB UMMC

## 2024-02-29 PROCEDURE — 84075 ASSAY ALKALINE PHOSPHATASE: CPT

## 2024-02-29 RX ORDER — MYCOPHENOLATE MOFETIL 250 MG/1
500 CAPSULE ORAL
Status: DISCONTINUED | OUTPATIENT
Start: 2024-02-29 | End: 2024-03-09 | Stop reason: HOSPADM

## 2024-02-29 RX ORDER — POLYETHYLENE GLYCOL 3350 17 G/17G
17 POWDER, FOR SOLUTION ORAL DAILY
Status: DISCONTINUED | OUTPATIENT
Start: 2024-02-29 | End: 2024-03-09 | Stop reason: HOSPADM

## 2024-02-29 RX ORDER — ACETAMINOPHEN 325 MG/1
650 TABLET ORAL EVERY 6 HOURS PRN
Status: DISCONTINUED | OUTPATIENT
Start: 2024-02-29 | End: 2024-03-09 | Stop reason: HOSPADM

## 2024-02-29 RX ORDER — DEXTROSE MONOHYDRATE 25 G/50ML
25-50 INJECTION, SOLUTION INTRAVENOUS
Status: DISCONTINUED | OUTPATIENT
Start: 2024-02-29 | End: 2024-03-09 | Stop reason: HOSPADM

## 2024-02-29 RX ORDER — AMOXICILLIN 250 MG
2 CAPSULE ORAL 2 TIMES DAILY PRN
Status: DISCONTINUED | OUTPATIENT
Start: 2024-02-29 | End: 2024-03-09 | Stop reason: HOSPADM

## 2024-02-29 RX ORDER — METOPROLOL TARTRATE 1 MG/ML
2.5 INJECTION, SOLUTION INTRAVENOUS EVERY 5 MIN PRN
Status: DISCONTINUED | OUTPATIENT
Start: 2024-02-29 | End: 2024-03-01

## 2024-02-29 RX ORDER — CALCIUM CARBONATE 500 MG/1
1000 TABLET, CHEWABLE ORAL 4 TIMES DAILY PRN
Status: DISCONTINUED | OUTPATIENT
Start: 2024-02-29 | End: 2024-03-09 | Stop reason: HOSPADM

## 2024-02-29 RX ORDER — LIDOCAINE 40 MG/G
CREAM TOPICAL
Status: DISCONTINUED | OUTPATIENT
Start: 2024-02-29 | End: 2024-03-09 | Stop reason: HOSPADM

## 2024-02-29 RX ORDER — AMOXICILLIN 250 MG
1 CAPSULE ORAL 2 TIMES DAILY PRN
Status: DISCONTINUED | OUTPATIENT
Start: 2024-02-29 | End: 2024-03-09 | Stop reason: HOSPADM

## 2024-02-29 RX ORDER — AZITHROMYCIN 250 MG/1
500 TABLET, FILM COATED ORAL DAILY
Qty: 4 TABLET | Refills: 0 | Status: COMPLETED | OUTPATIENT
Start: 2024-02-29 | End: 2024-03-01

## 2024-02-29 RX ORDER — ENOXAPARIN SODIUM 100 MG/ML
40 INJECTION SUBCUTANEOUS EVERY 24 HOURS
Status: DISCONTINUED | OUTPATIENT
Start: 2024-02-29 | End: 2024-03-02

## 2024-02-29 RX ORDER — CEFTRIAXONE 1 G/1
1 INJECTION, POWDER, FOR SOLUTION INTRAMUSCULAR; INTRAVENOUS EVERY 24 HOURS
Status: DISCONTINUED | OUTPATIENT
Start: 2024-02-29 | End: 2024-03-01

## 2024-02-29 RX ORDER — NICOTINE POLACRILEX 4 MG
15-30 LOZENGE BUCCAL
Status: DISCONTINUED | OUTPATIENT
Start: 2024-02-29 | End: 2024-03-09 | Stop reason: HOSPADM

## 2024-02-29 RX ORDER — SENNOSIDES 8.6 MG
8.6 TABLET ORAL 2 TIMES DAILY PRN
Status: DISCONTINUED | OUTPATIENT
Start: 2024-02-29 | End: 2024-03-09 | Stop reason: HOSPADM

## 2024-02-29 RX ADMIN — PREDNISONE 5 MG: 5 TABLET ORAL at 10:18

## 2024-02-29 RX ADMIN — METOPROLOL TARTRATE 2.5 MG: 5 INJECTION INTRAVENOUS at 17:59

## 2024-02-29 RX ADMIN — MYCOPHENOLATE MOFETIL 500 MG: 250 CAPSULE ORAL at 17:54

## 2024-02-29 RX ADMIN — CEFTRIAXONE SODIUM 1 G: 1 INJECTION, POWDER, FOR SOLUTION INTRAMUSCULAR; INTRAVENOUS at 21:26

## 2024-02-29 RX ADMIN — ENOXAPARIN SODIUM 40 MG: 40 INJECTION SUBCUTANEOUS at 10:16

## 2024-02-29 RX ADMIN — INSULIN ASPART 1 UNITS: 100 INJECTION, SOLUTION INTRAVENOUS; SUBCUTANEOUS at 10:16

## 2024-02-29 RX ADMIN — URSODIOL 250 MG: 250 TABLET, FILM COATED ORAL at 21:14

## 2024-02-29 RX ADMIN — Medication 50 MCG: at 10:15

## 2024-02-29 RX ADMIN — METOPROLOL TARTRATE 2.5 MG: 5 INJECTION INTRAVENOUS at 13:47

## 2024-02-29 RX ADMIN — INSULIN GLARGINE 10 UNITS: 100 INJECTION, SOLUTION SUBCUTANEOUS at 22:02

## 2024-02-29 RX ADMIN — AZITHROMYCIN DIHYDRATE 500 MG: 250 TABLET ORAL at 17:54

## 2024-02-29 RX ADMIN — ATORVASTATIN CALCIUM 40 MG: 40 TABLET, FILM COATED ORAL at 10:15

## 2024-02-29 RX ADMIN — INSULIN ASPART 3 UNITS: 100 INJECTION, SOLUTION INTRAVENOUS; SUBCUTANEOUS at 13:25

## 2024-02-29 RX ADMIN — POLYETHYLENE GLYCOL 3350 17 G: 17 POWDER, FOR SOLUTION ORAL at 17:55

## 2024-02-29 RX ADMIN — MYCOPHENOLATE MOFETIL 750 MG: 250 CAPSULE ORAL at 10:15

## 2024-02-29 RX ADMIN — LEVOTHYROXINE SODIUM 150 MCG: 0.05 TABLET ORAL at 10:15

## 2024-02-29 RX ADMIN — ACETAMINOPHEN 650 MG: 325 TABLET, FILM COATED ORAL at 04:18

## 2024-02-29 RX ADMIN — URSODIOL 250 MG: 250 TABLET, FILM COATED ORAL at 11:05

## 2024-02-29 ASSESSMENT — ACTIVITIES OF DAILY LIVING (ADL)
ADLS_ACUITY_SCORE: 37

## 2024-02-29 NOTE — PLAN OF CARE
"Goal Outcome Evaluation:    0400-0700  /82   Pulse 98   Temp 100.2  F (37.9  C) (Oral)   Resp 18   Ht 1.702 m (5' 7\")   Wt 86.6 kg (191 lb)   SpO2 94%   BMI 29.91 kg/m      Pt A&O, RA, VSS except elevated Temp - gave Tylenol  Denies pain and SOB  Up independently to bathroom  Low consistent carb diet  PIV, SL  No BM, voiding spontaneously  No acute change, continue w/poc          "

## 2024-02-29 NOTE — H&P
[facility]    History and Physical - Medicine Service, PATRICIO TEAM        Date of Admission:  2/28/2024    Assessment & Plan   Loy is a 69 y/o male with pmhx notable for alcohol-related cirrhosis c/b HCC s/p DDLT (2018), prostate cancer s/p RALP (1/2020), CKD, hypothyroidism, atrial fibrillation, hypertension & steroid/immunosuppression induced diabetes admitted for pyelonephritis & community acquired pneumonia.     #pyelonephritis  #hx of hemorrhagic cystitis with normal cystoscopy (2021)  #SIRS  Endorses dysuria, bilateral flank pain, fevers, nausea/vomiting, reduced appetite. Recently in [place] when symptoms started ~2 weeks ago. Prescribed levofloxacin, did not complete course (arrived today from [place]?) and feels that symptoms have not improved. No leukocytosis, lactate wnl. On exam mild flank tenderness. UA c/f infection & with hemauria. CT a/p with findings c/w bilateral pyelonephritis. Hx of prior UTI in 3/2023, grew panS E. Coli.   - s/p 2.5L of NS in ED  - continue ceftriaxone 1g q24h   > low threshold to broaden given was taking levaquin with no improvement  - follow cultures   - blood cx pending  - strict I/O    #community acquired pneumonia  #hx of latent TB-treated  Endorsed dyspnea, productive cough of yellow sputum. Procal 6. CXR with consolidation noted in LLL, ? RML w/ CT correlation. On room air.   - continue ceftriaxone & azithromycin for CAP coverage  - sputum cx  - legionella, strep pneumo antigen  - MRSA nares  - if not improving clinically consider fungal, atypical, other viral given immunocompromised & recent travel    #atrial fibrillation  Hx of post op afib previously on metoprolol (no longer) . EKG on arrival afib w/ rvr in the setting of acute infection.   - tele  - monitor    #hx of cirrhosis c/b HCC s/p DDLT (2018)  #hx of biliary stricture requiring stent, last ERCP 2019  #elevated alk phos, mild  #elevated AST, mild  Follows  "with hepatology, last visit 3/2023. Endorses feeling that his abdomen is distended more than usual. No alcohol use. No tenderness to palpation though abdomen does appear distended (? Mild over RUQ). CT a/p with no ascites. Will repeat labs in the AM to see trend.   - continue PTA mycophenolate & prednisone  - hepatology consulted, appreciate recs    #hyponatremia   Na 131, likely hypovolemic in setting of decreased po intake over course of illness.  - defer further urine studies for now, received 2.5L of NS in ED, repeat BMP in AM    #DEREK  #non gap acidosis   Creatinine 1.26, not far from baseline of ~0.9. Likely pre-renal.   - repeat BMP    #type 2 diabetes  In the setting of steroids/immunosuppression. Follows with endocrinology as an outpatient, recent A1c 9.2%.  - 10 of lantus ordered, home regimen is 20 units  - holding PTA metformin 1000mg BID (even though no indication)  - holding home jardiance 10mg 2/2 UTI  - medium correction scale  - hypoglycemia protocol    #elevated troponin-peaked  45-->43 in the setting of afib w/ rvr, infection. No chest pain. EKG with no acute ischemic changes.     #CAD s/p PCI to mLAD- not on ASA? should be     #hypertension- HOLD PTA hydralazine BID, continue PTA amlodipine    #hypothyroidism- continue PTA levothyroxine 150mcg    #hx of prostate cancer s/p RALP (2020)- follows with Urology as outpatient          Diet: carb consistent  DVT Prophylaxis: Enoxaparin (Lovenox) SQ  Cronin Catheter: Not present  Fluids: s/p 2.6L NS in ED  Lines: None     Cardiac Monitoring: None  Code Status:      Clinically Significant Risk Factors Present on Admission              # Hypoalbuminemia: Lowest albumin = 3.1 g/dL at 2/28/2024  8:48 PM, will monitor as appropriate     # Hypertension: Noted on problem list      # Overweight: Estimated body mass index is 29.91 kg/m  as calculated from the following:    Height as of this encounter: 1.702 m (5' 7\").    Weight as of this encounter: 86.6 kg (191 " "lb).              Disposition Plan      Expected Discharge Date: 2024                Formally staffed in .      Kirstin Solitario MD  Medicine Service, [facility]  Securely message with [employer] (more info)  Text page via [employer] Paging/Directory   See signed in provider for up to date coverage information  ______________________________________________________________________    Chief Complaint   Dysuria     History is obtained from the patient with the use of professional     History of Present Illness   Loy is a 69 y/o male with pmhx notable for alcohol-related cirrhosis c/b HCC s/p DDLT (), prostate cancer s/p RALP (2020), CKD, hypothyroidism, atrial fibrillation, hypertension & steroid/immunosuppression who presented to the ED with fever.     Recently flew in from [place], was there for a short time. Notes that over the last 10 days or so has increasing felt warm & like he is \"burning up\". Also reports increasing dysuria associated with bilateral flank pain, which he feels is similar to previous episode of UTI (though worse). He visited a hospital while in [place] and received levaquin (unclear how long he took for) but feels that his symptoms did not improve.     Additionally reports feeling short of breath with associated productive cough. No known sick contacts though again with recent travel to [place] for family member's .    Also endorses increasing abdominal bloating/distension with no associated bowel movement changes. Denies hematochezia or melena.  No missed medications.       Past Medical History    Past Medical History:   Diagnosis Date    Anemia     Biliary stricture of transplanted liver (H) 2018    BPH (benign prostatic hyperplasia)     Cancer (H)     hepatocellualr carcinoma    Cirrhosis of liver (H) 2018    Diabetes (H)     Enlarged prostate     Hypothyroidism     Impotence of organic " origin 2020    Inguinal hernia     Repaired with mesh on 18    Liver lesion 2018    Liver transplanted (H) 2018     donor liver transplant    Portal vein thrombosis     on path explant    Postoperative atrial fibrillation (H) 2018    Prostate cancer (H)     TB lung, latent     Treated        Past Surgical History   Past Surgical History:   Procedure Laterality Date    APPENDECTOMY      COLONOSCOPY          CV CORONARY ANGIOGRAM N/A 10/13/2021    Procedure: CV CORONARY ANGIOGRAM;  Surgeon: Yaya Hampton MD;  Location:  HEART CARDIAC CATH LAB    CV CORONARY LITHOTRIPSY PCI N/A 10/13/2021    Procedure: CV Coronary Lithotripsy PCI;  Surgeon: Yaya Hampton MD;  Location:  HEART CARDIAC CATH LAB    CV INSTANTANEOUS WAVE-FREE RATIO N/A 10/13/2021    Procedure: Instantaneous Wave-Free Ratio;  Surgeon: Yaya Hampton MD;  Location:  HEART CARDIAC CATH LAB    CV INTRAVASULAR ULTRASOUND N/A 10/13/2021    Procedure: Intravascular Ultrasound;  Surgeon: Yaya Hampton MD;  Location:  HEART CARDIAC CATH LAB    CV PCI STENT DRUG ELUTING N/A 10/13/2021    Procedure: Percutaneous Coronary Intervention Stent Drug Eluting;  Surgeon: Yaya Hampton MD;  Location:  HEART CARDIAC CATH LAB    DAVINCI PROSTATECTOMY N/A 1/3/2020    Procedure: Robot-assisted laparoscopic radical prostatectomy, Bladder biopsy;  Surgeon: Kenrick Casiano MD;  Location: [facility]    ENDOSCOPIC RETROGRADE CHOLANGIOPANCREATOGRAM N/A 2018    Procedure: ENDOSCOPIC RETROGRADE CHOLANGIOPANCREATOGRAM, with Billary Sphincterotomy and Billary Stent Placement;  Surgeon: Omero Lawler MD;  [facility]    ENDOSCOPIC RETROGRADE CHOLANGIOPANCREATOGRAM  2019    Procedure: COMBINED ENDOSCOPIC RETROGRADE CHOLANGIOPANCREATOGRAPHY, Bile Duct Stent Exchange;  Surgeon: Omero Lawler MD;  [facility]     "ENDOSCOPIC RETROGRADE CHOLANGIOPANCREATOGRAM N/A 2019    Procedure: ENDOSCOPIC RETROGRADE CHOLANGIOPANCREATOGRAPHY, WITH BILIARY STENT REMOVAL AND STONE EXTRACTION;  Surgeon: Omero Lawler MD;  [facility]    HERNIORRHAPHY INGUINAL  2018    Procedure: Herniorrhaphy inguinal;  Surgeon: Emil Echevarria MD;  [facility]    IR LIVER BIOPSY PERCUTANEOUS  2018    LAPAROTOMY EXPLORATORY      per the patient \"for infection in the LLQ\"     TRANSPLANT LIVER RECIPIENT  DONOR N/A 2018    Procedure: TRANSPLANT LIVER RECIPIENT  DONOR;  Surgeon: Emil Echevarria MD;  [facility]       Prior to Admission Medications   Prior to Admission Medications   Prescriptions Last Dose Informant Patient Reported? Taking?   Vitamin D3 (CHOLECALCIFEROL) 25 mcg (1000 units) tablet   No No   Sig: Take 2 tablets (50 mcg) by mouth daily   amLODIPine (NORVASC) 10 MG tablet   No No   Sig: Take 1 tablet (10 mg) by mouth daily   atorvastatin (LIPITOR) 40 MG tablet   No No   Sig: Take 1 tablet (40 mg) by mouth daily   blood glucose (NO BRAND SPECIFIED) lancets standard   No No   Sig: Use to test blood sugar 2 times daily or as directed.   blood glucose (NO BRAND SPECIFIED) test strip   No No   Sig: Use to test blood sugar 2 times daily or as directed. One touch ultra test strips   blood glucose (ONE TOUCH SURESOFT) lancing device   No No   Sig: Lancing device to be used with lancets.   blood glucose monitoring (ONE TOUCH ULTRA 2) meter device kit   No No   Sig: Use to test blood sugar 2 times daily or as directed.   blood glucose monitoring (ONE TOUCH ULTRASOFT) lancets   No No   Sig: Use to test blood sugar 2 times daily.   empagliflozin (JARDIANCE) 10 MG TABS tablet   No No   Sig: Take 1 tablet (10 mg) by mouth daily   hydrALAZINE (APRESOLINE) 25 MG tablet   No No   Sig: Take 1 tablet (25 mg) by mouth 2 times daily   insulin glargine (LANTUS PEN) 100 UNIT/ML pen   No No   Sig: " Inject 20 units subcutaneous at hs.   insulin pen needle (31G X 5 MM) 31G X 5 MM miscellaneous   No No   Sig: Use one  pen needles daily or as directed.   levothyroxine (SYNTHROID/LEVOTHROID) 150 MCG tablet   No No   Sig: Take 1 tablet (150 mcg) by mouth daily   metFORMIN (GLUCOPHAGE XR) 500 MG 24 hr tablet   No No   Sig: Take 2 tablets (1,000 mg) by mouth 2 times daily (with meals)   mycophenolate (GENERIC EQUIVALENT) 250 MG capsule   No No   Sig: TAKE 3 CAPSULES(750 MG) BY MOUTH TWICE DAILY   nitroGLYcerin (NITROSTAT) 0.4 MG sublingual tablet   No No   Sig: For chest pain place 1 tablet under the tongue every 5 minutes for 3 doses. If symptoms persist 5 minutes after 1st dose call 911.   predniSONE (DELTASONE) 5 MG tablet   No No   Sig: Take 1 tablet (5 mg) by mouth daily   ursodiol (ACTIGALL) 250 MG tablet   No No   Sig: Take 1 tablet (250 mg) by mouth 2 times daily      Facility-Administered Medications: None        Physical Exam   Vital Signs: Temp: 98.4  F (36.9  C) Temp src: Oral BP: (!) 141/76 Pulse: 98   Resp: 20 SpO2: 92 % O2 Device: None (Room air)    Weight: 191 lbs 0 oz    General: diaphoretic, mildly uncomfortable bu non-toxic appearing  Respiratory: on room air, coarse lung sounds w/ no crackles or wheezing  Cardiovascular: tachycardic, irregular rhythm   Abdomen: distended but non-tender, no peritoneal signs, no fluids wave  Skin: no jaundice  Neuro: alert & oriented, answering questions appropriately, no asterixis     Medical Decision Making           Data     I have personally reviewed the following data over the past 24 hrs:    7.9  \   12.5 (L)   / 221     131 (L) 98 25.0 (H) /  180 (H)   4.0 20 (L) 1.22 (H) \     ALT: 40 AST: 58 (H) AP: 311 (H) TBILI: 0.5   ALB: 3.1 (L) TOT PROTEIN: 6.7 LIPASE: N/A     Trop: 43 (H) BNP: N/A     Procal: 5.83 (HH) CRP: N/A Lactic Acid: 1.6

## 2024-02-29 NOTE — UTILIZATION REVIEW
Admission Status; Secondary Review Determination       Under the authority of the Utilization Management Committee, the utilization review process indicated a secondary review on the above patient. The review outcome is based on review of the medical records, discussions with staff, and applying clinical experience noted on the date of the review.     (x) Inpatient Status Appropriate - This patient's medical care is consistent with medical management for inpatient care and reasonable inpatient medical practice.     RATIONALE FOR DETERMINATION   68-year-old male with a complex medical history including alcohol-related cirrhosis with a history of hepatocellular carcinoma (s/p  donor liver transplantation in ), prostate cancer (s/p robotic-assisted laparoscopic prostatectomy in 2020), chronic kidney disease, hypothyroidism, atrial fibrillation, hypertension, and steroid/immunosuppression-induced diabetes, presents with pyelonephritis and community-acquired pneumonia. He reports dysuria, bilateral flank pain, fevers, nausea/vomiting, and reduced appetite, with recent travel to [place] correlating with symptom onset. Notable findings include hematuria on urinalysis and CT imaging consistent with bilateral pyelonephritis. Treatment includes ceftriaxone for pyelonephritis and ceftriaxone plus azithromycin for pneumonia, with plans to monitor for clinical improvement and consider broadening antibiotic coverage if necessary. Additionally, the patient has a history of atrial fibrillation with rapid ventricular response in the setting of acute infection, managed with telemonitoring. He also presents with hyponatremia, likely hypovolemic, and mild non-gap acidosis, with plans for further monitoring and repeat laboratory investigations.     The expected length of stay at the time of admission was more than 2 nights because of the severity of illness, intensity of service provided, and risk for adverse  outcome. Inpatient admission is appropriate.         This document was produced using voice recognition software       The information on this document is developed by the utilization review team in order for the business office to ensure compliance. This only denotes the appropriateness of proper admission status and does not reflect the quality of care rendered.   The definitions of Inpatient Status and Observation Status used in making the determination above are those provided in the CMS Coverage Manual, Chapter 1 and Chapter 6, section 70.4.   Sincerely,   KORI DIAMOND MD   System Medical Director   Utilization Management   [facility].

## 2024-02-29 NOTE — CONSULTS
HEPATOLOGY CONSULTATION      Date of Admission:  2024          ASSESSMENT AND RECOMMENDATIONS:     Loy Limon is a 68 year old male with history of  donor liver transplant in 2018 for alcohol related cirrhosis and HCC with post transplant course complicated by biliary stricture (2019), atrial fibrillation, CKD, T2DM, prostate cancer who presented for evaluation of fever, SOB, dysuria and flank pain. Found to have pneumonia and possible pyelonephritis.       # Fevers  # Pneumonia   # Possible pyelonephritis   # Hx of liver transplantation for HCC/EtOH cirrhosis (2018)  # Immunosuppressed status   Patient presenting with infectious symptoms and found to have imaging findings of pneumonia and possible bilateral pyelonephritis. Findings of both bilateral pneumonia and bilateral pyelonephritis is a bit unusual but he does have symptoms of both infections. Agree with antibiotic and monitoring cultures. He has done well in his post transplant course, will decrease his mycophenolate dose in setting of infection.   He does have history of biliary stricture after transplant. I suspect the mild elevatation of AST and alkaline phosphatase may be in setting of acute illness but if worsening alk phos elevation may need to consider further evaluation.       RECOMMENDATIONS   - agree with infectious workup and antibiotics   - blood and urine cultures pending   - Decrease mycophenolate 500mg BID (ordered)   - continue  prednisone 5mg daily,   - Continue ursodiol  - Trend hepatic panel daily    - if uptrending alkaline phosphatase, may need to consider further imaging of biliary system.     Thank you for involving us in this patient's care. Please do not hesitate to contact the GI service with any questions or concerns.     Patient care plan discussed with Dr. Nunes, GI staff physician.    Americo Astorga MD  Gastroenterology Fellow   [facility] [place]               Chief Complaint:   We were asked by   [facility] of medicine to evaluate this patient with hx of cirrhosis c/b HCC now s/p transplant (2018) here w/ probable UTI, pneumonia     History is obtained from the patient and the medical record.          History of Present Illness:   Loy Limon is a 68 year old male with history of  donor liver transplant in 2018 for alcohol related cirrhosis and HCC with post transplant course complicated by biliary stricture (), atrial fibrillation, CKD, T2DM, prostate cancer who presented for evaluation of fever, SOB, dysuria and flank pain. Found to have pneumonia and possible pyelonephritis.     Presenting with 10 days of fever, shortness of breath, cough, flank plain and dysuria. Labs are largely stable however has elevated procalc and imaging findings of pneumonia and possible bilateral pyelonephritis. No focal GI symptoms. Notes that abdomen feels more distended recently. Passing gas and having soft bowel movements.       Previously followed with Dr. Carballo in hepatology clinic. Initially on tacrolimus post transplant however this was changed to cellcept and prednisone in 2021 due to large proteinuria and concern for CNI-effects. On prednisone 5mg daily, mycophenolate 750mg. No issues with missing medications.         Past Medical History:   Reviewed and edited as appropriate  Past Medical History:   Diagnosis Date    Anemia     Biliary stricture of transplanted liver (H) 2018    BPH (benign prostatic hyperplasia)     Cancer (H)     hepatocellualr carcinoma    Cirrhosis of liver (H) 2018    Diabetes (H)     Enlarged prostate     Hypothyroidism     Impotence of organic origin 2020    Inguinal hernia     Repaired with mesh on 18    Liver lesion 2018    Liver transplanted (H) 2018     donor liver transplant    Portal vein thrombosis     on path explant    Postoperative atrial fibrillation (H) 2018    Prostate cancer (H)     TB lung, latent     Treated              Past Surgical History:   Reviewed and edited as appropriate   Past Surgical History:   Procedure Laterality Date    APPENDECTOMY      COLONOSCOPY      2018    CV CORONARY ANGIOGRAM N/A 10/13/2021    Procedure: CV CORONARY ANGIOGRAM;  Surgeon: Yaya Hampton MD;  Location:  [facility]    CV CORONARY LITHOTRIPSY PCI N/A 10/13/2021    Procedure: CV Coronary Lithotripsy PCI;  Surgeon: Yaya Hampton MD;  Location:  [facility]    CV INSTANTANEOUS WAVE-FREE RATIO N/A 10/13/2021    Procedure: Instantaneous Wave-Free Ratio;  Surgeon: Yaya Hampton MD;  Location:  [facility]    CV INTRAVASULAR ULTRASOUND N/A 10/13/2021    Procedure: Intravascular Ultrasound;  Surgeon: Yaya Hampton MD;  Location:  [facility]    CV PCI STENT DRUG ELUTING N/A 10/13/2021    Procedure: Percutaneous Coronary Intervention Stent Drug Eluting;  Surgeon: Yaya Hampton MD;  Location:  [facility]    DAVINCI PROSTATECTOMY N/A 1/3/2020    Procedure: Robot-assisted laparoscopic radical prostatectomy, Bladder biopsy;  Surgeon: Kenrick Casiano MD;  Location: [facility]    ENDOSCOPIC RETROGRADE CHOLANGIOPANCREATOGRAM N/A 12/28/2018    Procedure: ENDOSCOPIC RETROGRADE CHOLANGIOPANCREATOGRAM, with Billary Sphincterotomy and Billary Stent Placement;  Surgeon: Omero Lawler MD;  Location: [facility]    ENDOSCOPIC RETROGRADE CHOLANGIOPANCREATOGRAM  2/18/2019    Procedure: COMBINED ENDOSCOPIC RETROGRADE CHOLANGIOPANCREATOGRAPHY, Bile Duct Stent Exchange;  Surgeon: Omero Lawler MD;  Location: [facility]    ENDOSCOPIC RETROGRADE CHOLANGIOPANCREATOGRAM N/A 4/29/2019    Procedure: ENDOSCOPIC RETROGRADE CHOLANGIOPANCREATOGRAPHY, WITH BILIARY STENT REMOVAL AND STONE EXTRACTION;  Surgeon: Omero Lawler MD;  Location: [facility]    HERNIORRHAPHY INGUINAL  12/22/2018    Procedure:  "Herniorrhaphy inguinal;  Surgeon: Emil Echevarria MD;  Location: [facility]    IR LIVER BIOPSY PERCUTANEOUS  2018    LAPAROTOMY EXPLORATORY      per the patient \"for infection in the LLQ\"     TRANSPLANT LIVER RECIPIENT  DONOR N/A 2018    Procedure: TRANSPLANT LIVER RECIPIENT  DONOR;  Surgeon: Emil Echevarria MD;  Location: [facility]            Social History:   Reviewed and edited as appropriate  Social History     Socioeconomic History    Marital status:      Spouse name: Not on file    Number of children: Not on file    Years of education: Not on file    Highest education level: Not on file   Occupational History    Not on file   Tobacco Use    Smoking status: Former     Packs/day: 0.03     Years: 20.00     Additional pack years: 0.00     Total pack years: 0.60     Types: Cigarettes, Cigars     Start date: 1970     Quit date: 3/14/2018     Years since quittin.9    Smokeless tobacco: Never    Tobacco comments:     Quit smoking 2018, one cigarette after dinner   Vaping Use    Vaping Use: Never used   Substance and Sexual Activity    Alcohol use: No     Comment: Quit drinking 2018    Drug use: No    Sexual activity: Not on file   Other Topics Concern    Parent/sibling w/ CABG, MI or angioplasty before 65F 55M? Not Asked   Social History Narrative    Born in [place], came to [place] 30 years ago.     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cardboard, trimming meats     Social Determinants of Health     Financial Resource Strain: Not on file   Food Insecurity: Not on file   Transportation Needs: Not on file   Physical Activity: Not on file   Stress: Not on file   Social Connections: Not on file   Interpersonal Safety: Not At Risk (2023)    Humiliation, Afraid, Rape, and Kick questionnaire     Fear of Current or Ex-Partner: No     Emotionally Abused: No     Physically Abused: No     Sexually Abused: No   Housing Stability: Not on file            Family History:   Reviewed and " "edited as appropriate  No known history of gastrointestinal/liver disease or  gastrointestinal malignancies       Allergies:   Reviewed and edited as appropriate   No Known Allergies           Review of Systems:     A complete review of systems was performed and is negative except as noted in the HPI           Physical Exam:   /82   Pulse 98   Temp 98.7  F (37.1  C) (Oral)   Resp 18   Ht 1.702 m (5' 7\")   Wt 86.6 kg (191 lb)   SpO2 94%   BMI 29.91 kg/m    Wt:   Wt Readings from Last 2 Encounters:   02/28/24 86.6 kg (191 lb)   02/06/24 86.6 kg (191 lb)      Constitutional: cooperative, pleasant, not dyspneic/diaphoretic, no acute distress  Eyes: Sclera anicteric  Ears/nose/mouth/throat: Normal oropharynx without ulcers or exudate, mucus membranes moist, hearing intact  CV: tachycardic   Respiratory: Unlabored breathing on nasal cannula   Abdomen: obese abdomen, soft, nontender.   Skin: warm, perfused, no jaundice  Neuro: AAO x 3, No asterixis  Psych: Normal affect  MSK: Pitting edema in lower legs.          Data:   Labs and imaging below were independently reviewed and interpreted      BMP  Recent Labs   Lab 02/28/24 2324 02/28/24 2048   NA  --  131*   POTASSIUM  --  4.0   CHLORIDE  --  98   YELENA  --  8.7*   CO2  --  20*   BUN  --  25.0*   CR 1.22* 1.26*   GLC  --  180*     CBC  Recent Labs   Lab 02/29/24  0537 02/28/24 2048   WBC 7.9 8.9   RBC 4.46 4.85   HGB 12.5* 13.6   HCT 38.2* 40.6   MCV 86 84   MCH 28.0 28.0   MCHC 32.7 33.5   RDW 13.7 13.6    233     INRNo lab results found in last 7 days.  LFTs  Recent Labs   Lab 02/28/24 2048   ALKPHOS 311*   AST 58*   ALT 40   BILITOTAL 0.5   PROTTOTAL 6.7   ALBUMIN 3.1*      PANCNo lab results found in last 7 days.      "

## 2024-02-29 NOTE — ED TRIAGE NOTES
Patient presents to the ED for evaluation of bilateral flank pain, burning with urination. Patient reports these symptoms are accompanied by fever, fatigue, and SOB..    Patient reports these symptoms began approximately 2 weeks ago in [place].      Triage Assessment (Adult)       Row Name 02/28/24 2000          Triage Assessment    Airway WDL WDL        Respiratory WDL    Respiratory WDL WDL        Skin Circulation/Temperature WDL    Skin Circulation/Temperature WDL WDL        Cardiac WDL    Cardiac WDL WDL        Peripheral/Neurovascular WDL    Peripheral Neurovascular WDL WDL        Cognitive/Neuro/Behavioral WDL    Cognitive/Neuro/Behavioral WDL WDL

## 2024-02-29 NOTE — PROGRESS NOTES
[facility]    Progress Note - Medicine Service, [facility] TEAM 4       Date of Admission:  2/28/2024    Assessment & Plan   Loy is a 69 y/o male with pmhx notable for alcohol-related cirrhosis c/b HCC s/p DDLT (2018), prostate cancer s/p RALP (1/2020), CKD, hypothyroidism, atrial fibrillation, hypertension & steroid/immunosuppression induced diabetes admitted for acute pyelonephritis & community acquired pneumonia     Acute pyelonephritis  hx of hemorrhagic cystitis with normal cystoscopy (2021)  Endorses dysuria, bilateral flank pain, fevers, nausea/vomiting, reduced appetite. Recently in [place] when symptoms started ~2 weeks ago. Prescribed levofloxacin, did not complete course (arrived today from [place]?) and feels that symptoms have not improved.  CT a/p with findings c/w bilateral pyelonephritis. Hx of prior UTI in 3/2023, grew panS E. Coli.   - s/p 2.5L of NS in ED  - continue ceftriaxone 1g q24h  - follow cultures   - blood cx pending     Community acquired pneumonia  hx of latent TB-treated  Endorsed dyspnea, productive cough of yellow sputum. Procal 6. CXR with consolidation noted in LLL, ? RML w/ CT correlation. On room air.   - continue ceftriaxone & azithromycin for CAP coverage  - sputum cx  - legionella, strep pneumo antigen  - MRSA nares  - if not improving clinically consider fungal, atypical, other viral given immunocompromised & recent travel     Atrial fibrillation  Hx of post op afib previously on metoprolol (no longer) . EKG on arrival afib w/ rvr in the setting of acute infection.   - tele  - IV metoprolol 2.5 mg PRN for HR > 120s     Hx of cirrhosis c/b HCC s/p DDLT (2018)  Hx of biliary stricture requiring stent, last ERCP 2019  Follows with hepatology, last visit 3/2023. Endorses feeling that his abdomen is distended more than usual. No alcohol use. No tenderness.  CT a/p with no ascites.  - Defer to tx hepatology regarding  "immunosuppression     Hyponatremia - resolved  - CTM     DEREK - resolved  Non gap acidosis   Creatinine 1.26, not far from baseline of ~0.9. Likely pre-renal.   - repeat BMP in AM     Type 2 diabetes  In the setting of steroids/immunosuppression. Follows with endocrinology as an outpatient, recent A1c 9.2%.  - 10 of lantus ordered, home regimen is 20 units  - holding PTA metformin 1000mg BID (even though no indication)  - holding home jardiance 10mg 2/2 UTI  - medium correction scale  - hypoglycemia protocol     Elevated troponin-peaked  45-->43 in the setting of afib w/ rvr, infection. No chest pain. EKG with no acute ischemic changes.    CAD s/p PCI to mLAD- not on ASA? should be      Hypertension- HOLD PTA hydralazine BID, continue PTA amlodipine     Hypothyroidism- continue PTA levothyroxine 150mcg     Hx of prostate cancer s/p RALP (2020)- follows with Urology as outpatient      Diet: Low Consistent Carb (45 g CHO per Meal) Diet    DVT Prophylaxis: Enox  Cronin Catheter: Not present  Fluids: Tolerating PO  Lines: None     Cardiac Monitoring: ACTIVE order. Indication: sepsis  Code Status: Full Code      Clinically Significant Risk Factors Present on Admission              # Hypoalbuminemia: Lowest albumin = 2.7 g/dL at 2/29/2024  6:55 AM, will monitor as appropriate     # Hypertension: Noted on problem list      # Overweight: Estimated body mass index is 29.91 kg/m  as calculated from the following:    Height as of this encounter: 1.702 m (5' 7\").    Weight as of this encounter: 86.6 kg (191 lb).              Disposition Plan      Expected Discharge Date: 03/01/2024                The patient's care was discussed with Dr Oswaldo Wolf, DO  Medicine Service, [facility] TEAM [address] [place] [address]  Securely message with [employer] (more info)  Text page via [employer] Paging/Directory   See signed in provider for up to date coverage " information  ______________________________________________________________________    Interval History   Admitted overnight. Endorses feeling much better, stomach still feels bloated.     Physical Exam   Vital Signs: Temp: 98.6  F (37  C) Temp src: Oral BP: (!) 157/85 Pulse: (!) 133   Resp: 24 SpO2: 94 % O2 Device: Nasal cannula Oxygen Delivery: 3 LPM  Weight: 191 lbs 0 oz    General:  non-toxic appearing, NAD  Respiratory: on room air, coarse lung sounds w/ no crackles or wheezing  Cardiovascular: tachycardic, irregular rhythm   Abdomen: distended but non-tender, no peritoneal signs, no fluids wave  Skin: no jaundice  Neuro: alert & oriented, answering questions appropriately

## 2024-02-29 NOTE — ED PROVIDER NOTES
History     Chief Complaint   Patient presents with    Fever    Headache    Nausea, Vomiting, & Diarrhea    Shortness of Breath    Dysuria     HPI  Loyoksana Limon is a 68 year old male with a past medical history of liver transplant (on immunosuppression), atrial fibrillation, CKD, hypothyroidism, type II diabetes, hypertension, prostate cancer who presents to the emergency department with a chief complaint of fever.  Associate with bilateral flank pain and burning dysuria as well as fatigue, shortness of breath, nausea/vomiting, headache.  Symptoms started approximately 2 weeks ago while the patient was traveling in [place].    The patient reports that he did go to the hospital while he was in [place] and was prescribed Tylenol, ibuprofen, and levofloxacin (1 g daily for 10 days).  The patient states that he has not fully completed the prescribed course of antibiotics, but presented here as he was concerned that his symptoms were not improving.  He states nothing similar happened to him about 10 months ago, when he had similar symptoms, although at that time he did have some hematuria as well, which he does not have this time.    I have reviewed the Medications, Allergies, Past Medical and Surgical History, and Social History in the Epic system.    Most recent Urine Culture 3/11/23 >100,000 CFU/mL Escherichia coli Abnormal            Resulting Agency: [facility]     Susceptibility     Escherichia coli     STEPHANIE     Ampicillin Susceptible     Ampicillin/ Sulbactam Susceptible     Cefazolin Susceptible 1     Cefepime Susceptible     Cefoxitin Susceptible     Ceftazidime Susceptible     Ceftriaxone Susceptible     Ciprofloxacin Susceptible     Gentamicin Susceptible     Levofloxacin Susceptible     Nitrofurantoin Susceptible     Piperacillin/Tazobactam Susceptible     Tobramycin Susceptible     Trimethoprim/Sulfamethoxazole Susceptible                 Past Medical History:   Diagnosis Date    Anemia     Biliary stricture  of transplanted liver (H) 2018    BPH (benign prostatic hyperplasia)     Cancer (H)     hepatocellualr carcinoma    Cirrhosis of liver (H) 2018    Diabetes (H)     Enlarged prostate     Hypothyroidism     Impotence of organic origin 2020    Inguinal hernia     Repaired with mesh on 18    Liver lesion 2018    Liver transplanted (H) 2018     donor liver transplant    Portal vein thrombosis     on path explant    Postoperative atrial fibrillation (H) 2018    Prostate cancer (H)     TB lung, latent     Treated      Past Surgical History:   Procedure Laterality Date    APPENDECTOMY      COLONOSCOPY          CV CORONARY ANGIOGRAM N/A 10/13/2021    Procedure: CV CORONARY ANGIOGRAM;  Surgeon: Yaya Hampton MD;  [facility]    CV CORONARY LITHOTRIPSY PCI N/A 10/13/2021    Procedure: CV Coronary Lithotripsy PCI;  Surgeon: Yaya Hampton MD;  Location:  [facility]    CV INSTANTANEOUS WAVE-FREE RATIO N/A 10/13/2021    Procedure: Instantaneous Wave-Free Ratio;  Surgeon: Yaya Hampton MD;  Location:  [facility]    CV INTRAVASULAR ULTRASOUND N/A 10/13/2021    Procedure: Intravascular Ultrasound;  Surgeon: Yaya Hampton MD;  Location:  [facility]    CV PCI STENT DRUG ELUTING N/A 10/13/2021    Procedure: Percutaneous Coronary Intervention Stent Drug Eluting;  Surgeon: Yaya Hampton MD;  Location: [facility] CARDIAC CATH LAB    DAVINCI PROSTATECTOMY N/A 1/3/2020    Procedure: Robot-assisted laparoscopic radical prostatectomy, Bladder biopsy;  Surgeon: Kenrick Casiano MD;  Location: [facility]    ENDOSCOPIC RETROGRADE CHOLANGIOPANCREATOGRAM N/A 2018    Procedure: ENDOSCOPIC RETROGRADE CHOLANGIOPANCREATOGRAM, with Billary Sphincterotomy and Billary Stent Placement;  Surgeon: Omero Lawler MD;  Location: [facility]     "ENDOSCOPIC RETROGRADE CHOLANGIOPANCREATOGRAM  2019    Procedure: COMBINED ENDOSCOPIC RETROGRADE CHOLANGIOPANCREATOGRAPHY, Bile Duct Stent Exchange;  Surgeon: Omero Lawler MD;  [facility]: [facility]    ENDOSCOPIC RETROGRADE CHOLANGIOPANCREATOGRAM N/A 2019    Procedure: ENDOSCOPIC RETROGRADE CHOLANGIOPANCREATOGRAPHY, WITH BILIARY STENT REMOVAL AND STONE EXTRACTION;  Surgeon: Omero Lawler MD;  Location: [facility]    HERNIORRHAPHY INGUINAL  2018    Procedure: Herniorrhaphy inguinal;  Surgeon: Emil Echevarria MD;  Location: [facility]    IR LIVER BIOPSY PERCUTANEOUS  2018    LAPAROTOMY EXPLORATORY      per the patient \"for infection in the LLQ\"     TRANSPLANT LIVER RECIPIENT  DONOR N/A 2018    Procedure: TRANSPLANT LIVER RECIPIENT  DONOR;  Surgeon: Emil Echevarria MD;  Location: [facility] OR     No current facility-administered medications for this encounter.     Current Outpatient Medications   Medication    amLODIPine (NORVASC) 10 MG tablet    atorvastatin (LIPITOR) 40 MG tablet    blood glucose (NO BRAND SPECIFIED) lancets standard    blood glucose (NO BRAND SPECIFIED) test strip    blood glucose (ONE TOUCH SURESOFT) lancing device    blood glucose monitoring (ONE TOUCH ULTRA 2) meter device kit    blood glucose monitoring (ONE TOUCH ULTRASOFT) lancets    empagliflozin (JARDIANCE) 10 MG TABS tablet    hydrALAZINE (APRESOLINE) 25 MG tablet    insulin glargine (LANTUS PEN) 100 UNIT/ML pen    insulin pen needle (31G X 5 MM) 31G X 5 MM miscellaneous    levothyroxine (SYNTHROID/LEVOTHROID) 150 MCG tablet    metFORMIN (GLUCOPHAGE XR) 500 MG 24 hr tablet    mycophenolate (GENERIC EQUIVALENT) 250 MG capsule    nitroGLYcerin (NITROSTAT) 0.4 MG sublingual tablet    predniSONE (DELTASONE) 5 MG tablet    ursodiol (ACTIGALL) 250 MG tablet    Vitamin D3 (CHOLECALCIFEROL) 25 mcg (1000 units) tablet     No Known Allergies  Past medical history, past surgical history, medications, " "and allergies were reviewed with the patient. Additional pertinent items: None    Social History     Socioeconomic History    Marital status:      Spouse name: Not on file    Number of children: Not on file    Years of education: Not on file    Highest education level: Not on file   Occupational History    Not on file   Tobacco Use    Smoking status: Former     Packs/day: 0.03     Years: 20.00     Additional pack years: 0.00     Total pack years: 0.60     Types: Cigarettes, Cigars     Start date: 1970     Quit date: 3/14/2018     Years since quittin.9    Smokeless tobacco: Never    Tobacco comments:     Quit smoking 2018, one cigarette after dinner   Vaping Use    Vaping Use: Never used   Substance and Sexual Activity    Alcohol use: No     Comment: Quit drinking 2018    Drug use: No    Sexual activity: Not on file   Other Topics Concern    Parent/sibling w/ CABG, MI or angioplasty before 65F 55M? Not Asked   Social History Narrative    Born in [place], came to [place] years ago.     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 Additional pertinent items: None    Review of Systems  A medically appropriate review of systems was performed with pertinent positives and negatives noted in the HPI, and all other systems negative.    Physical Exam   BP: (!) 161/79  Pulse: (!) 129  Temp: 98.4  F (36.9  C)  Resp: 20  Height: 170.2 cm (5' 7\")  Weight: 86.6 kg (191 " lb)  SpO2: 93 %      General: Well nourished, well developed, NAD  HEENT: EOMI, anicteric. NCAT, MMM  Neck: no jugular venous distension, supple, nl ROM  Cardiac: Tachycardic rate, regular rhythm. No murmurs, rubs, or gallops. Normal S1, S2.  Intact peripheral pulses  Pulm: Coarse breath sounds bilateral lung bases, no wheezes  Abd: Soft, right upper quadrant tenderness to palpation without rebound or guarding, mild tenderness over lumbar back bilaterally, nondistended.  No masses palpated.    Skin: Warm and dry to the touch.  No rash  Extremities: No LE edema, no cyanosis, w/w/p  Neuro: A&Ox3, no gross focal deficits    ED Course        Procedures                      EKG Interpretation:      Interpreted by Sandra Celis MD  Time reviewed: 2058  Symptoms at time of EKG: Shortness of breath  Rhythm: Atrial fibrillation with RVR  Rate: Tachycardia, 120 bpm  LVH  Axis: NORMAL  Ectopy: none  Conduction: normal  ST Segments/ T Waves: No ST-T wave changes  Q Waves: none  Comparison to prior: Compared to previous EKGs dated July 19, 2023 and September 8, 2023, atrial fibrillation with RVR has replaced previous result of sinus bradycardia/sinus rhythm and previously seen T wave inversions in the lateral and anterior leads have resolved.    Clinical Impression: abnormal EKG           Labs Ordered and Resulted from Time of ED Arrival to Time of ED Departure   COMPREHENSIVE METABOLIC PANEL - Abnormal       Result Value    Sodium 131 (*)     Potassium 4.0      Carbon Dioxide (CO2) 20 (*)     Anion Gap 13      Urea Nitrogen 25.0 (*)     Creatinine 1.26 (*)     GFR Estimate 61      Calcium 8.7 (*)     Chloride 98      Glucose 180 (*)     Alkaline Phosphatase 311 (*)     AST 58 (*)     ALT 40      Protein Total 6.7      Albumin 3.1 (*)     Bilirubin Total 0.5     TROPONIN T, HIGH SENSITIVITY - Abnormal    Troponin T, High Sensitivity 45 (*)    ROUTINE UA WITH MICROSCOPIC REFLEX TO CULTURE - Abnormal    Color Urine Light  Yellow      Appearance Urine Clear      Glucose Urine >=1000 (*)     Bilirubin Urine Negative      Ketones Urine Negative      Specific Gravity Urine 1.015      Blood Urine Moderate (*)     pH Urine 5.5      Protein Albumin Urine 100 (*)     Urobilinogen Urine Normal      Nitrite Urine Negative      Leukocyte Esterase Urine Small (*)     Bacteria Urine Few (*)     RBC Urine 4 (*)     WBC Urine 49 (*)     Squamous Epithelials Urine <1      Transitional Epithelials Urine <1     TROPONIN T, HIGH SENSITIVITY - Abnormal    Troponin T, High Sensitivity 43 (*)    PROCALCITONIN - Abnormal    Procalcitonin 5.83 (*)    INFLUENZA A/B, RSV, & SARS-COV2 PCR - Normal    Influenza A PCR Negative      Influenza B PCR Negative      RSV PCR Negative      SARS CoV2 PCR Negative     LACTIC ACID WHOLE BLOOD - Normal    Lactic Acid 1.6     CBC WITH PLATELETS AND DIFFERENTIAL    WBC Count 8.9      RBC Count 4.85      Hemoglobin 13.6      Hematocrit 40.6      MCV 84      MCH 28.0      MCHC 33.5      RDW 13.6      Platelet Count 233      % Neutrophils 83      % Lymphocytes 10      % Monocytes 7      % Eosinophils 0      % Basophils 0      % Immature Granulocytes 0      NRBCs per 100 WBC 0      Absolute Neutrophils 7.3      Absolute Lymphocytes 0.9      Absolute Monocytes 0.6      Absolute Eosinophils 0.0      Absolute Basophils 0.0      Absolute Immature Granulocytes 0.0      Absolute NRBCs 0.0     BLOOD CULTURE   BLOOD CULTURE   URINE CULTURE   STREPTOCOCCUS PNEUMONIAE ANTIGEN   LEGIONELLA PNEUMOPHILA URINARY ANTIGEN   RESPIRATORY PANEL PCR   MRSA MSSA PCR, NASAL SWAB   RESPIRATORY AEROBIC BACTERIAL CULTURE            Results for orders placed or performed during the hospital encounter of 02/28/24 (from the past 24 hour(s))   UA with Microscopic reflex to Culture    Specimen: Urine, Midstream   Result Value Ref Range    Color Urine Light Yellow Colorless, Straw, Light Yellow, Yellow    Appearance Urine Clear Clear    Glucose Urine >=1000  (A) Negative mg/dL    Bilirubin Urine Negative Negative    Ketones Urine Negative Negative mg/dL    Specific Gravity Urine 1.015 1.003 - 1.035    Blood Urine Moderate (A) Negative    pH Urine 5.5 5.0 - 7.0    Protein Albumin Urine 100 (A) Negative mg/dL    Urobilinogen Urine Normal Normal, 2.0 mg/dL    Nitrite Urine Negative Negative    Leukocyte Esterase Urine Small (A) Negative    Bacteria Urine Few (A) None Seen /HPF    RBC Urine 4 (H) <=2 /HPF    WBC Urine 49 (H) <=5 /HPF    Squamous Epithelials Urine <1 <=1 /HPF    Transitional Epithelials Urine <1 <=1 /HPF    Narrative    Urine Culture ordered based on laboratory criteria   Symptomatic Influenza A/B, RSV, & SARS-CoV2 PCR (COVID-19) Nasopharyngeal    Specimen: Nasopharyngeal; Swab   Result Value Ref Range    Influenza A PCR Negative Negative    Influenza B PCR Negative Negative    RSV PCR Negative Negative    SARS CoV2 PCR Negative Negative    Narrative    Testing was performed using the Xpert Xpress CoV2/Flu/RSV Assay on the Cepheid GeneXpert Instrument. This test should be ordered for the detection of SARS-CoV-2, influenza, and RSV viruses in individuals who meet clinical and/or epidemiological criteria. Test performance is unknown in asymptomatic patients. This test is for in vitro diagnostic use under the FDA EUA for laboratories certified under CLIA to perform high or moderate complexity testing. This test has not been FDA cleared or approved. A negative result does not rule out the presence of PCR inhibitors in the specimen or target RNA in concentration below the limit of detection for the assay. If only one viral target is positive but coinfection with multiple targets is suspected, the sample should be re-tested with another FDA cleared, approved, or authorized test, if coinfection would change clinical management. This test was validated by the [facility]. These laboratories are certified under the Clinical Laboratory Improvement  Amendments of 1988 (CLIA-88) as qualified to perform high complexity laboratory testing.   EKG 12-lead, tracing only   Result Value Ref Range    Systolic Blood Pressure  mmHg    Diastolic Blood Pressure  mmHg    Ventricular Rate 128 BPM    Atrial Rate 159 BPM    VA Interval  ms    QRS Duration 88 ms     ms    QTc 455 ms    P Axis  degrees    R AXIS -23 degrees    T Axis 121 degrees    Interpretation ECG       Atrial fibrillation with rapid ventricular response  Minimal voltage criteria for LVH, may be normal variant  ST & T wave abnormality, consider lateral ischemia  Abnormal ECG     CBC with platelets differential    Narrative    The following orders were created for panel order CBC with platelets differential.  Procedure                               Abnormality         Status                     ---------                               -----------         ------                     CBC with platelets and d...[[id number]]                      Final result                 Please view results for these tests on the individual orders.   Comprehensive metabolic panel   Result Value Ref Range    Sodium 131 (L) 135 - 145 mmol/L    Potassium 4.0 3.4 - 5.3 mmol/L    Carbon Dioxide (CO2) 20 (L) 22 - 29 mmol/L    Anion Gap 13 7 - 15 mmol/L    Urea Nitrogen 25.0 (H) 8.0 - 23.0 mg/dL    Creatinine 1.26 (H) 0.67 - 1.17 mg/dL    GFR Estimate 61 >60 mL/min/1.73m2    Calcium 8.7 (L) 8.8 - 10.2 mg/dL    Chloride 98 98 - 107 mmol/L    Glucose 180 (H) 70 - 99 mg/dL    Alkaline Phosphatase 311 (H) 40 - 150 U/L    AST 58 (H) 0 - 45 U/L    ALT 40 0 - 70 U/L    Protein Total 6.7 6.4 - 8.3 g/dL    Albumin 3.1 (L) 3.5 - 5.2 g/dL    Bilirubin Total 0.5 <=1.2 mg/dL   Troponin T, High Sensitivity   Result Value Ref Range    Troponin T, High Sensitivity 45 (H) <=22 ng/L   Lactic acid whole blood   Result Value Ref Range    Lactic Acid 1.6 0.7 - 2.0 mmol/L   CBC with platelets and differential   Result Value Ref Range    WBC Count 8.9 4.0  - 11.0 10e3/uL    RBC Count 4.85 4.40 - 5.90 10e6/uL    Hemoglobin 13.6 13.3 - 17.7 g/dL    Hematocrit 40.6 40.0 - 53.0 %    MCV 84 78 - 100 fL    MCH 28.0 26.5 - 33.0 pg    MCHC 33.5 31.5 - 36.5 g/dL    RDW 13.6 10.0 - 15.0 %    Platelet Count 233 150 - 450 10e3/uL    % Neutrophils 83 %    % Lymphocytes 10 %    % Monocytes 7 %    % Eosinophils 0 %    % Basophils 0 %    % Immature Granulocytes 0 %    NRBCs per 100 WBC 0 <1 /100    Absolute Neutrophils 7.3 1.6 - 8.3 10e3/uL    Absolute Lymphocytes 0.9 0.8 - 5.3 10e3/uL    Absolute Monocytes 0.6 0.0 - 1.3 10e3/uL    Absolute Eosinophils 0.0 0.0 - 0.7 10e3/uL    Absolute Basophils 0.0 0.0 - 0.2 10e3/uL    Absolute Immature Granulocytes 0.0 <=0.4 10e3/uL    Absolute NRBCs 0.0 10e3/uL   XR Chest 2 Views    Narrative    EXAM: XR CHEST 2 VIEWS  LOCATION: [facility]  DATE: 2/28/2024    INDICATION: fever  COMPARISON: CT 07/14/2021.      Impression    IMPRESSION: Since 07/14/2021, development of a left lower lobe airspace opacity which could be infectious/inflammatory. Remainder not significantly changed. Bibasilar atelectasis/scarring. Normal heart size and pulmonary vasculature. No pleural effusion.   Upper abdominal surgical clips. Degenerative changes both shoulders.   CT Abdomen Pelvis w Contrast    Narrative    EXAM: CT ABDOMEN PELVIS W CONTRAST  LOCATION: [facility]  DATE: 2/28/2024    INDICATION: fever, nausea vomiting  COMPARISON: CT abdomen pelvis 03/11/2023. MR of the abdomen 04/26/2023.  TECHNIQUE: CT scan of the abdomen and pelvis was performed following injection of IV contrast. Multiplanar reformats were obtained. Dose reduction techniques were used.  CONTRAST: 118ml isovue 370    FINDINGS:   LOWER CHEST: Patchy alveolar consolidation of the included bases of both lower lobes and right middle lobe. Trace pleural effusions, right more than left. Tiny benign calcified  granuloma at the base of the left lower lobe.    HEPATOBILIARY: Postcholecystectomy. Normal liver and bile ducts.    PANCREAS: Normal.    SPLEEN: Normal.    ADRENAL GLANDS: Normal.    KIDNEYS/BLADDER: Subtle patchy cortical hypoenhancement of both kidneys suspicious for possible pyelonephritis. Mild edema of retroperitoneal fat near both ureters which are otherwise unremarkable and normal in caliber. No urolithiasis or hydronephrosis.   Bladder grossly unremarkable.    BOWEL: Small periampullary duodenal diverticulum. No bowel obstruction or significant inflammation. Appendix not seen separately. No evidence for appendicitis.    LYMPH NODES: No pathologically enlarged lymph nodes. 14 mm circumscribed benign-appearing cyst in the right retroperitoneum along the right lateral margin of the IVC is unchanged.    VASCULATURE: Moderate aortoiliac atherosclerosis. Coronary artery calcification incompletely imaged. No aneurysm.    PELVIC ORGANS: Normal.    MUSCULOSKELETAL: Degenerative changes of the spine.      Impression    IMPRESSION:   1.  Pneumonia involving the included bases of the right middle lobe and both lower lobes. Trace pleural effusions.  2.  Possible bilateral pyelonephritis. No abscess. Clinical correlation recommended.   Troponin T, High Sensitivity   Result Value Ref Range    Troponin T, High Sensitivity 43 (H) <=22 ng/L   Procalcitonin   Result Value Ref Range    Procalcitonin 5.83 (HH) <0.50 ng/mL     *Note: Due to a large number of results and/or encounters for the requested time period, some results have not been displayed. A complete set of results can be found in Results Review.       Labs, vital signs, and imaging studies were reviewed by me.    Medications   amLODIPine (NORVASC) tablet 10 mg ( Oral Automatically Held 3/3/24 0800)   atorvastatin (LIPITOR) tablet 40 mg (has no administration in time range)   empagliflozin (JARDIANCE) tablet 10 mg ( Oral Automatically Held 3/3/24 0800)   hydrALAZINE  (APRESOLINE) tablet 25 mg ( Oral Automatically Held 3/5/24 2000)   insulin glargine (LANTUS PEN) injection 20 Units ( Subcutaneous Automatically Held 3/5/24 2200)   levothyroxine (SYNTHROID/LEVOTHROID) tablet 150 mcg ( Oral Automatically Held 3/3/24 0800)   metFORMIN (GLUCOPHAGE XR) 24 hr tablet 1,000 mg ( Oral Automatically Held 3/3/24 1800)   mycophenolate (GENERIC EQUIVALENT) capsule 750 mg ( Oral Automatically Held 3/3/24 1800)   predniSONE (DELTASONE) tablet 5 mg ( Oral Automatically Held 3/3/24 0800)   ursodiol (ACTIGALL) tablet 250 mg ( Oral Automatically Held 3/5/24 2000)   Vitamin D3 (CHOLECALCIFEROL) tablet 50 mcg ( Oral Automatically Held 3/3/24 0800)   glucose gel 15-30 g (has no administration in time range)     Or   dextrose 50 % injection 25-50 mL (has no administration in time range)     Or   glucagon injection 1 mg (has no administration in time range)   acetaminophen (TYLENOL) tablet 650 mg (has no administration in time range)   insulin glargine (LANTUS PEN) injection 10 Units (has no administration in time range)   cefTRIAXone (ROCEPHIN) 1 g vial to attach to  mL bag for ADULTS or NS 50 mL bag for PEDS (has no administration in time range)   azithromycin (ZITHROMAX) tablet 500 mg (has no administration in time range)   sodium chloride 0.9% BOLUS 2,598 mL (0 mLs Intravenous Stopped 2/28/24 2329)   ketorolac (TORADOL) injection 10 mg (10 mg Intravenous $Given 2/28/24 2123)   cefTRIAXone (ROCEPHIN) 1 g vial to attach to  mL bag for ADULTS or NS 50 mL bag for PEDS (0 g Intravenous Stopped 2/28/24 2220)   azithromycin (ZITHROMAX) 500 mg in sodium chloride 0.9 % 250 mL intermittent infusion (0 mg Intravenous Stopped 2/29/24 0030)   iopamidol (ISOVUE-370) solution 118 mL (118 mLs Intravenous $Given 2/28/24 2155)   sodium chloride (PF) 0.9% PF flush 79 mL (79 mLs Intravenous $Given 2/28/24 2155)       Assessments & Plan (with Medical Decision Making)   Loy NICKOLAS Limon is a 68 year old male  who presents to the emergency department with fever.  Differential diagnosis includes urinary tract infection, pneumonia, bronchitis, COVID-19, influenza, RSV, other viral syndrome, other intra-abdominal infection.  Labs, EKG, CT, urinalysis, chest x-ray ordered to further evaluate patient in the emergency department.  IV fluids to be given as well.    EKG shows atrial fibrillation with RVR.    Laboratory workup was remarkable for normal white blood cell count, normal lactate, troponin elevated (will order 2-hour recheck), CMP shows sodium low at 131, bicarb 20, BUN 25, creatinine 1.26 (baseline around 0.9 per chart review), alk phos 311, AST 58, bilirubin, ALT within normal limits.  Urinalysis does appear concerning for UTI, which is of particular concern given that patient has been unsuccessfully treated with Levaquin over the last several days.  COVID/influenza/RSV negative    Chest x-ray shows left lower lobe opacity concerning for infection/inflammation.  Antibiotics have been ordered    CT shows pneumonia of right middle lobe and bilateral lower lobes with trace pleural effusions and possible bilateral pyelonephritis without abscess    Critical care was not performed.     Medical Decision Making  The patient's presentation was of high complexity (a chronic illness severe exacerbation, progression, or side effect of treatment).    The patient's evaluation involved:  ordering and/or review of 3+ test(s) in this encounter (see separate area of note for details)  independent interpretation of testing performed by another health professional (see separate area of note for details)  discussion of management or test interpretation with another health professional (see separate area of note for details)    The patient's management necessitated moderate risk (prescription drug management including medications given in the ED) and high risk (a decision regarding hospitalization).    XR, CT images were personally  reviewed by me, I agree with the radiology reads.    I have reviewed the nursing notes.    I have reviewed the findings, diagnosis, plan and need for follow up with the patient.    Patient and their further management were discussed with internal medicine, to be admitted to their service. Plan was discussed with patient who understands and agrees with plan.    New Prescriptions    No medications on file       Final diagnoses:   Fever in adult   Acute UTI   Community acquired pneumonia of left lower lobe of lung   DEREK (acute kidney injury) (H24)   Pyelonephritis, acute       AMANDA CELIS MD  2/28/2024   [facility] EMERGENCY DEPARTMENT       Amanda Celis MD  02/29/24 0137       Amanda Celis MD  02/29/24 0153

## 2024-03-01 LAB
AFP SERPL-MCNC: <1.8 NG/ML
ALBUMIN SERPL BCG-MCNC: 2.7 G/DL (ref 3.5–5.2)
ALP SERPL-CCNC: 322 U/L (ref 40–150)
ALT SERPL W P-5'-P-CCNC: 37 U/L (ref 0–70)
ANION GAP SERPL CALCULATED.3IONS-SCNC: 14 MMOL/L (ref 7–15)
AST SERPL W P-5'-P-CCNC: 54 U/L (ref 0–45)
BASOPHILS # BLD AUTO: 0 10E3/UL (ref 0–0.2)
BASOPHILS NFR BLD AUTO: 0 %
BILIRUB DIRECT SERPL-MCNC: <0.2 MG/DL (ref 0–0.3)
BILIRUB SERPL-MCNC: 0.4 MG/DL
BUN SERPL-MCNC: 18.9 MG/DL (ref 8–23)
CALCIUM SERPL-MCNC: 8.4 MG/DL (ref 8.8–10.2)
CHLORIDE SERPL-SCNC: 103 MMOL/L (ref 98–107)
CREAT SERPL-MCNC: 1.01 MG/DL (ref 0.67–1.17)
DEPRECATED HCO3 PLAS-SCNC: 17 MMOL/L (ref 22–29)
EGFRCR SERPLBLD CKD-EPI 2021: 80 ML/MIN/1.73M2
EOSINOPHIL # BLD AUTO: 0 10E3/UL (ref 0–0.7)
EOSINOPHIL NFR BLD AUTO: 0 %
ERYTHROCYTE [DISTWIDTH] IN BLOOD BY AUTOMATED COUNT: 13.9 % (ref 10–15)
GLUCOSE BLDC GLUCOMTR-MCNC: 168 MG/DL (ref 70–99)
GLUCOSE BLDC GLUCOMTR-MCNC: 175 MG/DL (ref 70–99)
GLUCOSE BLDC GLUCOMTR-MCNC: 254 MG/DL (ref 70–99)
GLUCOSE BLDC GLUCOMTR-MCNC: 258 MG/DL (ref 70–99)
GLUCOSE SERPL-MCNC: 127 MG/DL (ref 70–99)
HCT VFR BLD AUTO: 38 % (ref 40–53)
HGB BLD-MCNC: 12.6 G/DL (ref 13.3–17.7)
IMM GRANULOCYTES # BLD: 0.1 10E3/UL
IMM GRANULOCYTES NFR BLD: 1 %
LYMPHOCYTES # BLD AUTO: 1 10E3/UL (ref 0.8–5.3)
LYMPHOCYTES NFR BLD AUTO: 9 %
MCH RBC QN AUTO: 28.1 PG (ref 26.5–33)
MCHC RBC AUTO-ENTMCNC: 33.2 G/DL (ref 31.5–36.5)
MCV RBC AUTO: 85 FL (ref 78–100)
MONOCYTES # BLD AUTO: 0.6 10E3/UL (ref 0–1.3)
MONOCYTES NFR BLD AUTO: 5 %
NEUTROPHILS # BLD AUTO: 9.4 10E3/UL (ref 1.6–8.3)
NEUTROPHILS NFR BLD AUTO: 85 %
NRBC # BLD AUTO: 0 10E3/UL
NRBC BLD AUTO-RTO: 0 /100
PLATELET # BLD AUTO: 290 10E3/UL (ref 150–450)
POTASSIUM SERPL-SCNC: 3.9 MMOL/L (ref 3.4–5.3)
PROT SERPL-MCNC: 5.9 G/DL (ref 6.4–8.3)
RBC # BLD AUTO: 4.49 10E6/UL (ref 4.4–5.9)
SODIUM SERPL-SCNC: 134 MMOL/L (ref 135–145)
WBC # BLD AUTO: 11.1 10E3/UL (ref 4–11)

## 2024-03-01 PROCEDURE — 250N000009 HC RX 250

## 2024-03-01 PROCEDURE — 82962 GLUCOSE BLOOD TEST: CPT

## 2024-03-01 PROCEDURE — 82310 ASSAY OF CALCIUM: CPT

## 2024-03-01 PROCEDURE — 250N000009 HC RX 250: Performed by: INTERNAL MEDICINE

## 2024-03-01 PROCEDURE — 250N000013 HC RX MED GY IP 250 OP 250 PS 637: Performed by: INTERNAL MEDICINE

## 2024-03-01 PROCEDURE — 80053 COMPREHEN METABOLIC PANEL: CPT

## 2024-03-01 PROCEDURE — 99232 SBSQ HOSP IP/OBS MODERATE 35: CPT | Performed by: INTERNAL MEDICINE

## 2024-03-01 PROCEDURE — 250N000013 HC RX MED GY IP 250 OP 250 PS 637

## 2024-03-01 PROCEDURE — 120N000002 HC R&B MED SURG/OB UMMC

## 2024-03-01 PROCEDURE — 82105 ALPHA-FETOPROTEIN SERUM: CPT | Performed by: INTERNAL MEDICINE

## 2024-03-01 PROCEDURE — 36415 COLL VENOUS BLD VENIPUNCTURE: CPT

## 2024-03-01 PROCEDURE — 250N000012 HC RX MED GY IP 250 OP 636 PS 637: Performed by: STUDENT IN AN ORGANIZED HEALTH CARE EDUCATION/TRAINING PROGRAM

## 2024-03-01 PROCEDURE — 250N000011 HC RX IP 250 OP 636

## 2024-03-01 PROCEDURE — 85025 COMPLETE CBC W/AUTO DIFF WBC: CPT

## 2024-03-01 RX ORDER — METOPROLOL TARTRATE 1 MG/ML
2.5 INJECTION, SOLUTION INTRAVENOUS ONCE
Status: COMPLETED | OUTPATIENT
Start: 2024-03-01 | End: 2024-03-01

## 2024-03-01 RX ORDER — AMLODIPINE BESYLATE 5 MG/1
5 TABLET ORAL DAILY
Status: DISCONTINUED | OUTPATIENT
Start: 2024-03-02 | End: 2024-03-06

## 2024-03-01 RX ORDER — AMLODIPINE BESYLATE 2.5 MG/1
2.5 TABLET ORAL DAILY
Status: DISCONTINUED | OUTPATIENT
Start: 2024-03-01 | End: 2024-03-01

## 2024-03-01 RX ORDER — METOPROLOL TARTRATE 1 MG/ML
2.5 INJECTION, SOLUTION INTRAVENOUS EVERY 6 HOURS PRN
Status: DISCONTINUED | OUTPATIENT
Start: 2024-03-01 | End: 2024-03-04

## 2024-03-01 RX ORDER — HYDRALAZINE HYDROCHLORIDE 20 MG/ML
5 INJECTION INTRAMUSCULAR; INTRAVENOUS EVERY 6 HOURS PRN
Status: DISCONTINUED | OUTPATIENT
Start: 2024-03-01 | End: 2024-03-09 | Stop reason: HOSPADM

## 2024-03-01 RX ORDER — AMLODIPINE BESYLATE 2.5 MG/1
2.5 TABLET ORAL ONCE
Status: DISCONTINUED | OUTPATIENT
Start: 2024-03-01 | End: 2024-03-01

## 2024-03-01 RX ORDER — ERTAPENEM 1 G/1
1 INJECTION, POWDER, LYOPHILIZED, FOR SOLUTION INTRAMUSCULAR; INTRAVENOUS EVERY 24 HOURS
Status: DISCONTINUED | OUTPATIENT
Start: 2024-03-01 | End: 2024-03-08

## 2024-03-01 RX ORDER — METOPROLOL TARTRATE 25 MG/1
25 TABLET, FILM COATED ORAL 2 TIMES DAILY
Status: DISCONTINUED | OUTPATIENT
Start: 2024-03-01 | End: 2024-03-04

## 2024-03-01 RX ADMIN — ACETAMINOPHEN 650 MG: 325 TABLET, FILM COATED ORAL at 05:45

## 2024-03-01 RX ADMIN — METOPROLOL TARTRATE 2.5 MG: 5 INJECTION INTRAVENOUS at 12:57

## 2024-03-01 RX ADMIN — MYCOPHENOLATE MOFETIL 500 MG: 250 CAPSULE ORAL at 19:31

## 2024-03-01 RX ADMIN — AMLODIPINE BESYLATE 2.5 MG: 2.5 TABLET ORAL at 09:51

## 2024-03-01 RX ADMIN — ENOXAPARIN SODIUM 40 MG: 40 INJECTION SUBCUTANEOUS at 08:07

## 2024-03-01 RX ADMIN — URSODIOL 250 MG: 250 TABLET, FILM COATED ORAL at 19:29

## 2024-03-01 RX ADMIN — METOPROLOL TARTRATE 25 MG: 25 TABLET, FILM COATED ORAL at 09:51

## 2024-03-01 RX ADMIN — METOPROLOL TARTRATE 2.5 MG: 5 INJECTION INTRAVENOUS at 01:01

## 2024-03-01 RX ADMIN — INSULIN ASPART 3 UNITS: 100 INJECTION, SOLUTION INTRAVENOUS; SUBCUTANEOUS at 12:45

## 2024-03-01 RX ADMIN — AZITHROMYCIN DIHYDRATE 500 MG: 250 TABLET ORAL at 16:58

## 2024-03-01 RX ADMIN — METOPROLOL TARTRATE 2.5 MG: 5 INJECTION INTRAVENOUS at 15:56

## 2024-03-01 RX ADMIN — ACETAMINOPHEN 650 MG: 325 TABLET, FILM COATED ORAL at 15:41

## 2024-03-01 RX ADMIN — MYCOPHENOLATE MOFETIL 500 MG: 250 CAPSULE ORAL at 08:05

## 2024-03-01 RX ADMIN — ERTAPENEM SODIUM 1 G: 1 INJECTION, POWDER, LYOPHILIZED, FOR SOLUTION INTRAMUSCULAR; INTRAVENOUS at 01:47

## 2024-03-01 RX ADMIN — INSULIN ASPART 1 UNITS: 100 INJECTION, SOLUTION INTRAVENOUS; SUBCUTANEOUS at 16:56

## 2024-03-01 RX ADMIN — METOPROLOL TARTRATE 25 MG: 25 TABLET, FILM COATED ORAL at 19:31

## 2024-03-01 ASSESSMENT — ACTIVITIES OF DAILY LIVING (ADL)
ADLS_ACUITY_SCORE: 37
ADLS_ACUITY_SCORE: 38
ADLS_ACUITY_SCORE: 37
ADLS_ACUITY_SCORE: 38
ADLS_ACUITY_SCORE: 37
ADLS_ACUITY_SCORE: 38
ADLS_ACUITY_SCORE: 37
ADLS_ACUITY_SCORE: 38
ADLS_ACUITY_SCORE: 37
ADLS_ACUITY_SCORE: 37

## 2024-03-01 NOTE — PLAN OF CARE
"Goal Outcome Evaluation:       BP (!) 155/105 (BP Location: Right arm)   Pulse 112   Temp 99  F (37.2  C) (Oral)   Resp 20   Ht 1.702 m (5' 7\")   Wt 86.6 kg (191 lb)   SpO2 92%   BMI 29.91 kg/m      Shift [id number]  Activity:  Assist x1  Neuros: A &O X4, Sami speaking but can understand some english.   Cardiac: On tele, pulse elevated at times >120, prn metoprolol administer. BP elevated at times.   Respiratory: on 4L via NC, wheezing, but is not SOB.   GI/: voiding spon, no bm noted on this shift.   Diet: low consistent carb  Skin: see flowsheet   Lines:  R PIV SL  Labs: REVIEWED   Pain/nausea: denied, but did feeling warm and have an headache, PRN tylenol. Cool wash cloth to head.  New changes this shift: per lab urine is growing  Ecoli  and MD Radha updated, IV ABX change.   Plan: IV ABX, BS check, cont POC.                    "

## 2024-03-01 NOTE — PROGRESS NOTES
[facility]    Medicine Progress Note - Medicine Service, MARSTEPHAN[facility] TEAM 4    Date of Admission:  2/28/2024    Assessment & Plan    Loy is a 67 y/o male with pmhx notable for alcohol-related cirrhosis c/b HCC s/p DDLT (2018), prostate cancer s/p RALP (1/2020), CKD, hypothyroidism, atrial fibrillation, hypertension & steroid/immunosuppression induced diabetes admitted for acute pyelonephritis & community acquired pneumonia     Today  - ESBL E Coli on urine culture. Abx switched from rocephin to ertapenem  - started lopressor 25 bid for a fib w RVR  - LFT in AM  - [id number] Score 3, start DOAC if no contraindication  - lantus 15 at HS     Acute pyelonephritis  hx of hemorrhagic cystitis with normal cystoscopy (2021)  Endorses dysuria, bilateral flank pain, fevers, nausea/vomiting, reduced appetite. Recently in [place] when symptoms started ~2 weeks ago. Prescribed levofloxacin, did not complete course (arrived today from [place]?) and feels that symptoms have not improved.  CT a/p with findings c/w bilateral pyelonephritis. Hx of prior UTI in 3/2023, grew panS E. Coli.   - s/p 2.5L of NS in ED  - follow cultures        Community acquired pneumonia  hx of latent TB-treated  Endorsed dyspnea, productive cough of yellow sputum. Procal 6. CXR with consolidation noted in LLL, ? RML w/ CT correlation. On room air.   - continue azithromycin, Rocephin switched to ertapenem  - sputum cx  - legionella, strep pneumo antigen  - MRSA nares  - if not improving clinically consider fungal, atypical, other viral given immunocompromised & recent travel     Atrial fibrillation  Hx of post op afib previously on metoprolol (no longer) . EKG on arrival afib w/ rvr in the setting of acute infection.   - tele  - IV metoprolol 2.5 mg PRN for HR > 120s  - started lopressor 25 bid     Hx of cirrhosis c/b HCC s/p DDLT (2018)  Hx of biliary stricture requiring stent, last ERCP 2019  Follows with  "hepatology, last visit 3/2023. Endorses feeling that his abdomen is distended more than usual. No alcohol use. No tenderness.  CT a/p with no ascites.  - Defer to tx hepatology regarding immunosuppression     Hyponatremia - resolved  - CTM     DEREK - resolved  Non gap acidosis   Creatinine 1.26, not far from baseline of ~0.9. Likely pre-renal.   - repeat BMP in AM     Type 2 diabetes  In the setting of steroids/immunosuppression. Follows with endocrinology as an outpatient, recent A1c 9.2%.  - 10 of lantus changed to 15 at HS, home regimen is 20 units  - holding PTA metformin 1000mg BID (even though no indication)  - holding home jardiance 10mg 2/2 UTI  - medium correction scale  - hypoglycemia protocol     Elevated troponin-peaked  45-->43 in the setting of afib w/ rvr, infection. No chest pain. EKG with no acute ischemic changes.     CAD s/p PCI to mLAD- not on ASA? should be      Hypertension- HOLD PTA hydralazine BID, continue PTA amlodipine (restarted at 5 mg daily instead of 10 daily. Increase as tolerated by BP)     Hypothyroidism- continue PTA levothyroxine 150mcg     Hx of prostate cancer s/p RALP (2020)- follows with Urology as outpatient             Diet: Low Consistent Carb (45 g CHO per Meal) Diet    DVT Prophylaxis: Enoxaparin (Lovenox) SQ  Cronin Catheter: Not present  Lines: None     Cardiac Monitoring: ACTIVE order. Indication: sepsis  Code Status: Full Code      Clinically Significant Risk Factors              # Hypoalbuminemia: Lowest albumin = 2.7 g/dL at 3/1/2024  7:08 AM, will monitor as appropriate     # Hypertension: Noted on problem list        # Overweight: Estimated body mass index is 29.91 kg/m  as calculated from the following:    Height as of this encounter: 1.702 m (5' 7\").    Weight as of this encounter: 86.6 kg (191 lb)., PRESENT ON ADMISSION            Disposition Plan     Expected Discharge Date: 03/01/2024                    Jessica Chacon MD  Medicine Service, [facility] 4  M [facility] " [facility]  Securely message with [employer] (more info)  Text page via [employer] Paging/Directory   See signed in provider for up to date coverage information  ______________________________________________________________________    Interval History   Patient feels better after IV metoprolol for tachycardia. ROS neg    Physical Exam   Vital Signs: Temp: 99.4  F (37.4  C) Temp src: Oral BP: (!) 153/111 Pulse: 109   Resp: 20 SpO2: 94 % O2 Device: Nasal cannula Oxygen Delivery: 4 LPM  Weight: 191 lbs 0 oz    General Appearance: Well built and nourished, comfortable at rest, no acute CP distress  Respiratory: CTA bl  Cardiovascular: S1S2 normal RRR  GI: soft NT  Skin: NAD  Other: Aaox3 moving all 4 ext spont     Medical Decision Making             Data     I have personally reviewed the following data over the past 24 hrs:    11.1 (H)  \   12.6 (L)   / 290     134 (L) 103 18.9 /  175 (H)   3.9 17 (L) 1.01 \     ALT: 37 AST: 54 (H) AP: 322 (H) TBILI: 0.4   ALB: 2.7 (L) TOT PROTEIN: 5.9 (L) LIPASE: N/A       Imaging results reviewed over the past 24 hrs:   No results found for this or any previous visit (from the past 24 hour(s)).

## 2024-03-02 ENCOUNTER — APPOINTMENT (OUTPATIENT)
Dept: GENERAL RADIOLOGY | Facility: CLINIC | Age: 69
DRG: 193 | End: 2024-03-02
Attending: INTERNAL MEDICINE
Payer: COMMERCIAL

## 2024-03-02 LAB
ACANTHOCYTES BLD QL SMEAR: NORMAL
ALBUMIN SERPL BCG-MCNC: 2.7 G/DL (ref 3.5–5.2)
ALP SERPL-CCNC: 349 U/L (ref 40–150)
ALT SERPL W P-5'-P-CCNC: 35 U/L (ref 0–70)
ANION GAP SERPL CALCULATED.3IONS-SCNC: 10 MMOL/L (ref 7–15)
AST SERPL W P-5'-P-CCNC: 40 U/L (ref 0–45)
AUER BODIES BLD QL SMEAR: NORMAL
BACTERIA SPT CULT: NORMAL
BASO STIPL BLD QL SMEAR: NORMAL
BASOPHILS # BLD AUTO: 0 10E3/UL (ref 0–0.2)
BASOPHILS NFR BLD AUTO: 0 %
BILIRUB DIRECT SERPL-MCNC: 0.24 MG/DL (ref 0–0.3)
BILIRUB SERPL-MCNC: 0.5 MG/DL
BITE CELLS BLD QL SMEAR: NORMAL
BLISTER CELLS BLD QL SMEAR: NORMAL
BUN SERPL-MCNC: 15.6 MG/DL (ref 8–23)
BURR CELLS BLD QL SMEAR: NORMAL
CALCIUM SERPL-MCNC: 8.4 MG/DL (ref 8.8–10.2)
CHLORIDE SERPL-SCNC: 104 MMOL/L (ref 98–107)
CREAT SERPL-MCNC: 0.92 MG/DL (ref 0.67–1.17)
DACRYOCYTES BLD QL SMEAR: NORMAL
DEPRECATED HCO3 PLAS-SCNC: 21 MMOL/L (ref 22–29)
EGFRCR SERPLBLD CKD-EPI 2021: 89 ML/MIN/1.73M2
ELLIPTOCYTES BLD QL SMEAR: NORMAL
EOSINOPHIL # BLD AUTO: 0.1 10E3/UL (ref 0–0.7)
EOSINOPHIL NFR BLD AUTO: 1 %
ERYTHROCYTE [DISTWIDTH] IN BLOOD BY AUTOMATED COUNT: 13.7 % (ref 10–15)
FRAGMENTS BLD QL SMEAR: NORMAL
GLUCOSE BLDC GLUCOMTR-MCNC: 189 MG/DL (ref 70–99)
GLUCOSE BLDC GLUCOMTR-MCNC: 271 MG/DL (ref 70–99)
GLUCOSE BLDC GLUCOMTR-MCNC: 336 MG/DL (ref 70–99)
GLUCOSE BLDC GLUCOMTR-MCNC: 339 MG/DL (ref 70–99)
GLUCOSE BLDC GLUCOMTR-MCNC: 368 MG/DL (ref 70–99)
GLUCOSE SERPL-MCNC: 178 MG/DL (ref 70–99)
GRAM STAIN RESULT: NORMAL
HCT VFR BLD AUTO: 39.3 % (ref 40–53)
HGB BLD-MCNC: 12.9 G/DL (ref 13.3–17.7)
HGB C CRYSTALS: NORMAL
HOLD SPECIMEN: NORMAL
HOLD SPECIMEN: NORMAL
HOWELL-JOLLY BOD BLD QL SMEAR: NORMAL
IMM GRANULOCYTES # BLD: 0.1 10E3/UL
IMM GRANULOCYTES NFR BLD: 1 %
LYMPHOCYTES # BLD AUTO: 1.4 10E3/UL (ref 0.8–5.3)
LYMPHOCYTES NFR BLD AUTO: 17 %
MCH RBC QN AUTO: 28 PG (ref 26.5–33)
MCHC RBC AUTO-ENTMCNC: 32.8 G/DL (ref 31.5–36.5)
MCV RBC AUTO: 85 FL (ref 78–100)
MONOCYTES # BLD AUTO: 0.5 10E3/UL (ref 0–1.3)
MONOCYTES NFR BLD AUTO: 7 %
NEUTROPHILS # BLD AUTO: 6.1 10E3/UL (ref 1.6–8.3)
NEUTROPHILS NFR BLD AUTO: 74 %
NEUTS HYPERSEG BLD QL SMEAR: NORMAL
NRBC # BLD AUTO: 0 10E3/UL
NRBC BLD AUTO-RTO: 0 /100
NT-PROBNP SERPL-MCNC: ABNORMAL PG/ML (ref 0–900)
PLAT MORPH BLD: NORMAL
PLATELET # BLD AUTO: 330 10E3/UL (ref 150–450)
POLYCHROMASIA BLD QL SMEAR: NORMAL
POTASSIUM SERPL-SCNC: 4.1 MMOL/L (ref 3.4–5.3)
PROCALCITONIN SERPL IA-MCNC: 1.62 NG/ML
PROT SERPL-MCNC: 6.2 G/DL (ref 6.4–8.3)
RBC # BLD AUTO: 4.6 10E6/UL (ref 4.4–5.9)
RBC AGGLUT BLD QL: NORMAL
RBC MORPH BLD: NORMAL
ROULEAUX BLD QL SMEAR: NORMAL
SICKLE CELLS BLD QL SMEAR: NORMAL
SMUDGE CELLS BLD QL SMEAR: NORMAL
SODIUM SERPL-SCNC: 135 MMOL/L (ref 135–145)
SPHEROCYTES BLD QL SMEAR: NORMAL
STOMATOCYTES BLD QL SMEAR: NORMAL
T4 FREE SERPL-MCNC: 1.24 NG/DL (ref 0.9–1.7)
TARGETS BLD QL SMEAR: NORMAL
TOXIC GRANULES BLD QL SMEAR: NORMAL
TSH SERPL DL<=0.005 MIU/L-ACNC: 5.47 UIU/ML (ref 0.3–4.2)
VARIANT LYMPHS BLD QL SMEAR: NORMAL
WBC # BLD AUTO: 8.3 10E3/UL (ref 4–11)

## 2024-03-02 PROCEDURE — 250N000012 HC RX MED GY IP 250 OP 636 PS 637

## 2024-03-02 PROCEDURE — 84145 PROCALCITONIN (PCT): CPT | Performed by: INTERNAL MEDICINE

## 2024-03-02 PROCEDURE — 80053 COMPREHEN METABOLIC PANEL: CPT

## 2024-03-02 PROCEDURE — 250N000012 HC RX MED GY IP 250 OP 636 PS 637: Performed by: STUDENT IN AN ORGANIZED HEALTH CARE EDUCATION/TRAINING PROGRAM

## 2024-03-02 PROCEDURE — 36415 COLL VENOUS BLD VENIPUNCTURE: CPT

## 2024-03-02 PROCEDURE — 84439 ASSAY OF FREE THYROXINE: CPT | Performed by: INTERNAL MEDICINE

## 2024-03-02 PROCEDURE — 83880 ASSAY OF NATRIURETIC PEPTIDE: CPT | Performed by: INTERNAL MEDICINE

## 2024-03-02 PROCEDURE — 71045 X-RAY EXAM CHEST 1 VIEW: CPT

## 2024-03-02 PROCEDURE — 99232 SBSQ HOSP IP/OBS MODERATE 35: CPT | Performed by: INTERNAL MEDICINE

## 2024-03-02 PROCEDURE — 250N000011 HC RX IP 250 OP 636

## 2024-03-02 PROCEDURE — 84443 ASSAY THYROID STIM HORMONE: CPT | Performed by: INTERNAL MEDICINE

## 2024-03-02 PROCEDURE — 82962 GLUCOSE BLOOD TEST: CPT

## 2024-03-02 PROCEDURE — 250N000013 HC RX MED GY IP 250 OP 250 PS 637: Performed by: INTERNAL MEDICINE

## 2024-03-02 PROCEDURE — 82248 BILIRUBIN DIRECT: CPT

## 2024-03-02 PROCEDURE — 71045 X-RAY EXAM CHEST 1 VIEW: CPT | Mod: 26 | Performed by: STUDENT IN AN ORGANIZED HEALTH CARE EDUCATION/TRAINING PROGRAM

## 2024-03-02 PROCEDURE — 250N000013 HC RX MED GY IP 250 OP 250 PS 637

## 2024-03-02 PROCEDURE — 85025 COMPLETE CBC W/AUTO DIFF WBC: CPT

## 2024-03-02 PROCEDURE — 120N000002 HC R&B MED SURG/OB UMMC

## 2024-03-02 RX ORDER — METFORMIN HCL 500 MG
1000 TABLET, EXTENDED RELEASE 24 HR ORAL 2 TIMES DAILY WITH MEALS
Status: DISCONTINUED | OUTPATIENT
Start: 2024-03-02 | End: 2024-03-09 | Stop reason: HOSPADM

## 2024-03-02 RX ADMIN — METOPROLOL TARTRATE 12.5 MG: 25 TABLET, FILM COATED ORAL at 12:30

## 2024-03-02 RX ADMIN — ERTAPENEM SODIUM 1 G: 1 INJECTION, POWDER, LYOPHILIZED, FOR SOLUTION INTRAMUSCULAR; INTRAVENOUS at 01:46

## 2024-03-02 RX ADMIN — Medication 50 MCG: at 09:40

## 2024-03-02 RX ADMIN — APIXABAN 2.5 MG: 2.5 TABLET, FILM COATED ORAL at 20:22

## 2024-03-02 RX ADMIN — ATORVASTATIN CALCIUM 40 MG: 40 TABLET, FILM COATED ORAL at 09:39

## 2024-03-02 RX ADMIN — METOPROLOL TARTRATE 25 MG: 25 TABLET, FILM COATED ORAL at 09:39

## 2024-03-02 RX ADMIN — PREDNISONE 5 MG: 5 TABLET ORAL at 09:39

## 2024-03-02 RX ADMIN — AMLODIPINE BESYLATE 5 MG: 5 TABLET ORAL at 09:39

## 2024-03-02 RX ADMIN — INSULIN ASPART 5 UNITS: 100 INJECTION, SOLUTION INTRAVENOUS; SUBCUTANEOUS at 12:47

## 2024-03-02 RX ADMIN — METOPROLOL TARTRATE 25 MG: 25 TABLET, FILM COATED ORAL at 20:21

## 2024-03-02 RX ADMIN — ENOXAPARIN SODIUM 40 MG: 40 INJECTION SUBCUTANEOUS at 09:30

## 2024-03-02 RX ADMIN — INSULIN ASPART 1 UNITS: 100 INJECTION, SOLUTION INTRAVENOUS; SUBCUTANEOUS at 18:01

## 2024-03-02 RX ADMIN — LEVOTHYROXINE SODIUM 150 MCG: 0.05 TABLET ORAL at 09:39

## 2024-03-02 RX ADMIN — URSODIOL 250 MG: 250 TABLET, FILM COATED ORAL at 20:22

## 2024-03-02 RX ADMIN — MYCOPHENOLATE MOFETIL 500 MG: 250 CAPSULE ORAL at 18:03

## 2024-03-02 RX ADMIN — METFORMIN ER 500 MG 1000 MG: 500 TABLET ORAL at 18:03

## 2024-03-02 RX ADMIN — INSULIN ASPART 5 UNITS: 100 INJECTION, SOLUTION INTRAVENOUS; SUBCUTANEOUS at 09:44

## 2024-03-02 RX ADMIN — MYCOPHENOLATE MOFETIL 500 MG: 250 CAPSULE ORAL at 09:42

## 2024-03-02 RX ADMIN — URSODIOL 250 MG: 250 TABLET, FILM COATED ORAL at 09:40

## 2024-03-02 ASSESSMENT — ACTIVITIES OF DAILY LIVING (ADL)
ADLS_ACUITY_SCORE: 40
ADLS_ACUITY_SCORE: 38
ADLS_ACUITY_SCORE: 38
ADLS_ACUITY_SCORE: 40
ADLS_ACUITY_SCORE: 38
ADLS_ACUITY_SCORE: 40
ADLS_ACUITY_SCORE: 40
ADLS_ACUITY_SCORE: 38
ADLS_ACUITY_SCORE: 40
ADLS_ACUITY_SCORE: 40
ADLS_ACUITY_SCORE: 38
ADLS_ACUITY_SCORE: 40
ADLS_ACUITY_SCORE: 38
ADLS_ACUITY_SCORE: 40
ADLS_ACUITY_SCORE: 38
ADLS_ACUITY_SCORE: 40
ADLS_ACUITY_SCORE: 38
ADLS_ACUITY_SCORE: 40
ADLS_ACUITY_SCORE: 38
ADLS_ACUITY_SCORE: 38

## 2024-03-02 NOTE — PROGRESS NOTES
[facility]    Medicine Progress Note - Medicine Service, MARSTEPH[facility]IE TEAM 4    Date of Admission:  2/28/2024    Assessment & Plan   Loy is a 67 y/o male with pmhx notable for alcohol-related cirrhosis c/b HCC s/p DDLT (2018), prostate cancer s/p RALP (1/2020), CKD, hypothyroidism, atrial fibrillation, hypertension & steroid/immunosuppression induced diabetes admitted for acute pyelonephritis & community acquired pneumonia     Today  - ESBL E Coli on urine culture. Abx switched from rocephin to ertapenem  - started lopressor 25 bid for a fib w RVR. Giving additional dose 12.5 this AM. If tolerates plan to increase lopressor to 37.5 bid  - check TSH  - LFT in AM (trending up. Might need imaging if uptrend continues)  - start DOAC   - lantus 15 at HS increase to 20 at HS (home dose) and resume home metformin 1000 bid  - one time dose lantus 5 units once  - ongoing need for Oxygen. Check F/U CXR. Check BNP.      Acute pyelonephritis  ESBL E Coli on Urine culture  hx of hemorrhagic cystitis with normal cystoscopy (2021)  Endorses dysuria, bilateral flank pain, fevers, nausea/vomiting, reduced appetite. Recently in [place] when symptoms started ~2 weeks ago. Prescribed levofloxacin, did not complete course (arrived today from [place]?) and feels that symptoms have not improved.  CT a/p with findings c/w bilateral pyelonephritis. Hx of prior UTI in 3/2023, grew panS E. Coli.   - s/p 2.5L of NS in ED  - follow cultures   - BC NGTD     Community acquired pneumonia  hx of latent TB-treated  Endorsed dyspnea, productive cough of yellow sputum. Procal 6. CXR with consolidation noted in LLL, ? RML w/ CT correlation. On room air.   - continue azithromycin, Rocephin switched to ertapenem  - sputum cx  - legionella and strep pneumo antigen neg  - MRSA nares neg  - if not improving clinically consider fungal, atypical, other viral given immunocompromised & recent travel     Atrial  fibrillation  Hx of post op afib previously on metoprolol (no longer) . EKG on arrival afib w/ rvr in the setting of acute infection.   - tele  - IV metoprolol 2.5 mg PRN for HR > 120s  - started lopressor 25 bid  - echo 2/9/24  - check TSH  - [id number] Score 3, start apixaban 2.5 bid. Patient gives a h/o hematuria otherwise no h/o bleeding. He is currently not on any AC or antiplatelet agents including baby aspirin. If he tolerates apixaban 2.5 bid x24 hours increase dose to 5 bid.     Hx of cirrhosis c/b HCC s/p DDLT (2018)  Hx of biliary stricture requiring stent, last ERCP 2019  Follows with hepatology, last visit 3/2023. Endorses feeling that his abdomen is distended more than usual. No alcohol use. No tenderness.  CT a/p with no ascites.  - Defer to tx hepatology regarding immunosuppression     Hyponatremia - resolved  - CTM     DEREK - resolved  Non gap acidosis   Creatinine 1.26, not far from baseline of ~0.9. Likely pre-renal.   - repeat BMP in AM     Type 2 diabetes  In the setting of steroids/immunosuppression. Follows with endocrinology as an outpatient, recent A1c 9.2%.  - resumelantus home regimen is 20 units  - resume PTA metformin 1000mg BID   - holding home jardiance 10mg 2/2 UTI  - medium correction scale  - hypoglycemia protocol     Elevated troponin-peaked  45-->43 in the setting of afib w/ rvr, infection. No chest pain. EKG with no acute ischemic changes.     CAD s/p PCI to mLAD- not on ASA? should be . Started apixaban this admission. Patient does not know why he is not on aspirin. He gives a h/o hematuria when asked about any h/o bleeding. If tolerates apixaban will recommend adding a baby aspirin which can be done inpatient or outpatient.     Hypertension- HOLD PTA hydralazine BID, continue PTA amlodipine (restarted at 5 mg daily instead of 10 daily)     Hypothyroidism- continue PTA levothyroxine 150mcg     Hx of prostate cancer s/p RALP (2020)- follows with Urology as outpatient            "  Diet: Low Consistent Carb (45 g CHO per Meal) Diet    DVT Prophylaxis: DOAC  Cronin Catheter: Not present  Lines: None     Cardiac Monitoring: ACTIVE order. Indication: sepsis  Code Status: Full Code      Clinically Significant Risk Factors              # Hypoalbuminemia: Lowest albumin = 2.7 g/dL at 3/2/2024  7:51 AM, will monitor as appropriate     # Hypertension: Noted on problem list        # Overweight: Estimated body mass index is 29.91 kg/m  as calculated from the following:    Height as of this encounter: 1.702 m (5' 7\").    Weight as of this encounter: 86.6 kg (191 lb)., PRESENT ON ADMISSION            Disposition Plan    home in 2 days likely         Jessica Chacon MD  Medicine Service, [facility] TEAM 4  M [facility]  Securely message with [email or web address] (more info)  Text page via [employer] Paging/Directory   See signed in provider for up to date coverage information  ______________________________________________________________________    Interval History   Patient resting comfortably. ROS neg. Feels like breathing and overall he is better today. Not on oxygen at home. Had hematuria in the past. He does not recall any other h/o bleeding. Denies any h/o peptic ulcers or GI bleed. He does not know why he is not on aspirin.    Physical Exam   Vital Signs: Temp: 98.2  F (36.8  C) Temp src: Oral BP: 137/88 Pulse: (!) 126   Resp: 17 SpO2: 97 % O2 Device: Nasal cannula Oxygen Delivery: 4 LPM  Weight: 191 lbs 0 oz    General Appearance: Well built and nourished, comfortable at rest, no acute CP distress  Respiratory: CTA bl  Cardiovascular: soft NT  GI: NAD  Skin: NAD  Other: Aaox3 moving all 4 ext spont     Medical Decision Making             Data     I have personally reviewed the following data over the past 24 hrs:    8.3  \   12.9 (L)   / 330     135 104 15.6 /  368 (H)   4.1 21 (L) 0.92 \     ALT: 35 AST: 40 AP: 349 (H) TBILI: 0.5   ALB: 2.7 (L) TOT PROTEIN: 6.2 (L) LIPASE: N/A "     TSH: 5.47 (H) T4: 1.24 A1C: N/A     Procal: 1.62 (H) CRP: N/A Lactic Acid: N/A         Imaging results reviewed over the past 24 hrs:   No results found for this or any previous visit (from the past 24 hour(s)).

## 2024-03-02 NOTE — TELEPHONE ENCOUNTER
Problem: Pain - Adult  Goal: Verbalizes/displays adequate comfort level or baseline comfort level  Outcome: Progressing     Problem: Safety - Adult  Goal: Free from fall injury  Outcome: Progressing   The patient's goals for the shift include      The clinical goals for the shift include maintain pain at tolerable level       Voice message was left for Ali with [facility] for home care orders.  RN asked Ali to call back at RN's direct number with any questions.    Gina Ware RN on 1/9/2019 at 9:12 AM

## 2024-03-02 NOTE — MEDICATION SCRIBE - ADMISSION MEDICATION HISTORY
Medication Scribe Admission Medication History    Admission medication history is complete. The information provided in this note is only as accurate as the sources available at the time of the update.    Information Source(s): Patient via in-person    Pertinent Information: Spoke with patient in person using English  over the phone and completed medication hx. Patient states that he is not taking Nitroglycerin 0.4 sublingual tab, Prednisone 5 GM Tab, Ursodiol 250 MG Tab and Vitamin D3 25 MCG (1000 units) Tab.    Patient states that his provider discontinued Ursodiol 250 MG Tab about 2 months ago.     Changes made to PTA medication list:  Added: None  Deleted: Nitroglycerin 0.4 sublingual tab           Prednisone 5 GM Tab              Ursodiol 250 MG Tab              Vitamin D3 25 MCG (1000 units) Tab  Changed: None    Allergies reviewed with patient and updates made in EHR: no    Medication History Completed By: Leighann Parikh 3/2/2024 8:57 AM    PTA Med List   Medication Sig Last Dose    amLODIPine (NORVASC) 10 MG tablet Take 1 tablet (10 mg) by mouth daily 2/28/2024    blood glucose (NO BRAND SPECIFIED) lancets standard Use to test blood sugar 2 times daily or as directed.     blood glucose (NO BRAND SPECIFIED) test strip Use to test blood sugar 2 times daily or as directed. One touch ultra test strips     blood glucose (ONE TOUCH SURESOFT) lancing device Lancing device to be used with lancets.     blood glucose monitoring (ONE TOUCH ULTRA 2) meter device kit Use to test blood sugar 2 times daily or as directed.     blood glucose monitoring (ONE TOUCH ULTRASOFT) lancets Use to test blood sugar 2 times daily.     insulin glargine (LANTUS PEN) 100 UNIT/ML pen Inject 20 units subcutaneous at hs. 3/1/2024 at AM    insulin pen needle (31G X 5 MM) 31G X 5 MM miscellaneous Use one  pen needles daily or as directed.

## 2024-03-02 NOTE — PROVIDER NOTIFICATION
"Provider notified: Federico Wolf    \"I gave pt lovenox before I saw the order was d/c'd, do you want to retime eliquis? Also to clarify you want additional dose of metoprolol, correct? 25mg was given.\"    OK to hold AM dose of Eliquis and resume this evening, also give 12.5mg metoprolol as ordered.  "

## 2024-03-02 NOTE — ED NOTES
"/89 (BP Location: Right arm, Cuff Size: Adult Regular)   Pulse 104   Temp 98.2  F (36.8  C) (Oral)   Resp 17   Ht 1.702 m (5' 7\")   Wt 86.6 kg (191 lb)   SpO2 96%   BMI 29.91 kg/m    Neuro: A&Ox4, Ukrainian speaking, but speaks little English.   Cardiac: SR, Afebrile, VSS.   Respiratory: Lungs sound with fine crackles, dyspnea with exercise, and productive cough observed, Sat> 95% on 4L of oxygen via NC  GI/: Active BS, passing flatus, and voiding spontaneously. No BM this shift.  Diet/appetite: Tolerating regular diet. Denies nausea.  Activity: Up with standby assist    Pain: Denies   Skin: No new deficits noted.  Lines: PIV x 1, R SL'D  Plan: Continue with the current POC, notify Team with any change in status       "

## 2024-03-03 ENCOUNTER — APPOINTMENT (OUTPATIENT)
Dept: CARDIOLOGY | Facility: CLINIC | Age: 69
DRG: 193 | End: 2024-03-03
Attending: INTERNAL MEDICINE
Payer: COMMERCIAL

## 2024-03-03 ENCOUNTER — APPOINTMENT (OUTPATIENT)
Dept: CT IMAGING | Facility: CLINIC | Age: 69
DRG: 193 | End: 2024-03-03
Payer: COMMERCIAL

## 2024-03-03 LAB
ACANTHOCYTES BLD QL SMEAR: NORMAL
ALBUMIN SERPL BCG-MCNC: 2.5 G/DL (ref 3.5–5.2)
ALP SERPL-CCNC: 330 U/L (ref 40–150)
ALT SERPL W P-5'-P-CCNC: 39 U/L (ref 0–70)
ANION GAP SERPL CALCULATED.3IONS-SCNC: 9 MMOL/L (ref 7–15)
AST SERPL W P-5'-P-CCNC: 37 U/L (ref 0–45)
AUER BODIES BLD QL SMEAR: NORMAL
BASO STIPL BLD QL SMEAR: NORMAL
BASOPHILS # BLD AUTO: 0 10E3/UL (ref 0–0.2)
BASOPHILS NFR BLD AUTO: 0 %
BILIRUB DIRECT SERPL-MCNC: <0.2 MG/DL (ref 0–0.3)
BILIRUB SERPL-MCNC: 0.3 MG/DL
BITE CELLS BLD QL SMEAR: NORMAL
BLISTER CELLS BLD QL SMEAR: NORMAL
BUN SERPL-MCNC: 17.5 MG/DL (ref 8–23)
BURR CELLS BLD QL SMEAR: NORMAL
CALCIUM SERPL-MCNC: 8.1 MG/DL (ref 8.8–10.2)
CHLORIDE SERPL-SCNC: 107 MMOL/L (ref 98–107)
CREAT SERPL-MCNC: 0.79 MG/DL (ref 0.67–1.17)
DACRYOCYTES BLD QL SMEAR: NORMAL
DEPRECATED HCO3 PLAS-SCNC: 21 MMOL/L (ref 22–29)
EGFRCR SERPLBLD CKD-EPI 2021: >90 ML/MIN/1.73M2
ELLIPTOCYTES BLD QL SMEAR: NORMAL
EOSINOPHIL # BLD AUTO: 0.2 10E3/UL (ref 0–0.7)
EOSINOPHIL NFR BLD AUTO: 3 %
ERYTHROCYTE [DISTWIDTH] IN BLOOD BY AUTOMATED COUNT: 13.6 % (ref 10–15)
FRAGMENTS BLD QL SMEAR: NORMAL
GLUCOSE BLDC GLUCOMTR-MCNC: 131 MG/DL (ref 70–99)
GLUCOSE BLDC GLUCOMTR-MCNC: 196 MG/DL (ref 70–99)
GLUCOSE BLDC GLUCOMTR-MCNC: 199 MG/DL (ref 70–99)
GLUCOSE BLDC GLUCOMTR-MCNC: 201 MG/DL (ref 70–99)
GLUCOSE BLDC GLUCOMTR-MCNC: 271 MG/DL (ref 70–99)
GLUCOSE SERPL-MCNC: 125 MG/DL (ref 70–99)
HCT VFR BLD AUTO: 35.9 % (ref 40–53)
HGB BLD-MCNC: 11.5 G/DL (ref 13.3–17.7)
HGB C CRYSTALS: NORMAL
HOWELL-JOLLY BOD BLD QL SMEAR: NORMAL
IMM GRANULOCYTES # BLD: 0.1 10E3/UL
IMM GRANULOCYTES NFR BLD: 1 %
LVEF ECHO: NORMAL
LYMPHOCYTES # BLD AUTO: 1.4 10E3/UL (ref 0.8–5.3)
LYMPHOCYTES NFR BLD AUTO: 18 %
MCH RBC QN AUTO: 27.3 PG (ref 26.5–33)
MCHC RBC AUTO-ENTMCNC: 32 G/DL (ref 31.5–36.5)
MCV RBC AUTO: 85 FL (ref 78–100)
MONOCYTES # BLD AUTO: 0.5 10E3/UL (ref 0–1.3)
MONOCYTES NFR BLD AUTO: 7 %
NEUTROPHILS # BLD AUTO: 5.3 10E3/UL (ref 1.6–8.3)
NEUTROPHILS NFR BLD AUTO: 71 %
NEUTS HYPERSEG BLD QL SMEAR: NORMAL
NRBC # BLD AUTO: 0 10E3/UL
NRBC BLD AUTO-RTO: 0 /100
PLAT MORPH BLD: NORMAL
PLATELET # BLD AUTO: 365 10E3/UL (ref 150–450)
POLYCHROMASIA BLD QL SMEAR: NORMAL
POTASSIUM SERPL-SCNC: 3.7 MMOL/L (ref 3.4–5.3)
PROT SERPL-MCNC: 5.6 G/DL (ref 6.4–8.3)
RBC # BLD AUTO: 4.22 10E6/UL (ref 4.4–5.9)
RBC AGGLUT BLD QL: NORMAL
RBC MORPH BLD: NORMAL
ROULEAUX BLD QL SMEAR: NORMAL
SICKLE CELLS BLD QL SMEAR: NORMAL
SMUDGE CELLS BLD QL SMEAR: NORMAL
SODIUM SERPL-SCNC: 137 MMOL/L (ref 135–145)
SPHEROCYTES BLD QL SMEAR: NORMAL
STOMATOCYTES BLD QL SMEAR: NORMAL
TARGETS BLD QL SMEAR: NORMAL
TOXIC GRANULES BLD QL SMEAR: NORMAL
VARIANT LYMPHS BLD QL SMEAR: NORMAL
WBC # BLD AUTO: 7.5 10E3/UL (ref 4–11)

## 2024-03-03 PROCEDURE — 93325 DOPPLER ECHO COLOR FLOW MAPG: CPT | Mod: 26 | Performed by: INTERNAL MEDICINE

## 2024-03-03 PROCEDURE — 120N000002 HC R&B MED SURG/OB UMMC

## 2024-03-03 PROCEDURE — 255N000002 HC RX 255 OP 636: Performed by: INTERNAL MEDICINE

## 2024-03-03 PROCEDURE — 250N000012 HC RX MED GY IP 250 OP 636 PS 637: Performed by: STUDENT IN AN ORGANIZED HEALTH CARE EDUCATION/TRAINING PROGRAM

## 2024-03-03 PROCEDURE — 99232 SBSQ HOSP IP/OBS MODERATE 35: CPT | Mod: GC | Performed by: INTERNAL MEDICINE

## 2024-03-03 PROCEDURE — 250N000011 HC RX IP 250 OP 636: Performed by: INTERNAL MEDICINE

## 2024-03-03 PROCEDURE — 80053 COMPREHEN METABOLIC PANEL: CPT

## 2024-03-03 PROCEDURE — 93308 TTE F-UP OR LMTD: CPT | Mod: 26 | Performed by: INTERNAL MEDICINE

## 2024-03-03 PROCEDURE — 85025 COMPLETE CBC W/AUTO DIFF WBC: CPT

## 2024-03-03 PROCEDURE — 71260 CT THORAX DX C+: CPT | Mod: 26 | Performed by: RADIOLOGY

## 2024-03-03 PROCEDURE — 36415 COLL VENOUS BLD VENIPUNCTURE: CPT

## 2024-03-03 PROCEDURE — 250N000012 HC RX MED GY IP 250 OP 636 PS 637

## 2024-03-03 PROCEDURE — 250N000013 HC RX MED GY IP 250 OP 250 PS 637: Performed by: INTERNAL MEDICINE

## 2024-03-03 PROCEDURE — 250N000013 HC RX MED GY IP 250 OP 250 PS 637

## 2024-03-03 PROCEDURE — 93325 DOPPLER ECHO COLOR FLOW MAPG: CPT

## 2024-03-03 PROCEDURE — 93321 DOPPLER ECHO F-UP/LMTD STD: CPT | Mod: 26 | Performed by: INTERNAL MEDICINE

## 2024-03-03 PROCEDURE — 82248 BILIRUBIN DIRECT: CPT

## 2024-03-03 PROCEDURE — 71260 CT THORAX DX C+: CPT

## 2024-03-03 PROCEDURE — 250N000011 HC RX IP 250 OP 636: Mod: JZ

## 2024-03-03 RX ORDER — IOPAMIDOL 755 MG/ML
59 INJECTION, SOLUTION INTRAVASCULAR ONCE
Status: COMPLETED | OUTPATIENT
Start: 2024-03-03 | End: 2024-03-03

## 2024-03-03 RX ORDER — POTASSIUM CHLORIDE 750 MG/1
40 TABLET, EXTENDED RELEASE ORAL ONCE
Status: COMPLETED | OUTPATIENT
Start: 2024-03-03 | End: 2024-03-03

## 2024-03-03 RX ADMIN — HUMAN ALBUMIN MICROSPHERES AND PERFLUTREN 5 ML: 10; .22 INJECTION, SOLUTION INTRAVENOUS at 08:52

## 2024-03-03 RX ADMIN — APIXABAN 2.5 MG: 2.5 TABLET, FILM COATED ORAL at 09:28

## 2024-03-03 RX ADMIN — AMLODIPINE BESYLATE 5 MG: 5 TABLET ORAL at 09:28

## 2024-03-03 RX ADMIN — Medication 50 MCG: at 09:28

## 2024-03-03 RX ADMIN — ATORVASTATIN CALCIUM 40 MG: 40 TABLET, FILM COATED ORAL at 09:28

## 2024-03-03 RX ADMIN — METOPROLOL TARTRATE 25 MG: 25 TABLET, FILM COATED ORAL at 09:28

## 2024-03-03 RX ADMIN — ERTAPENEM SODIUM 1 G: 1 INJECTION, POWDER, LYOPHILIZED, FOR SOLUTION INTRAMUSCULAR; INTRAVENOUS at 02:11

## 2024-03-03 RX ADMIN — INSULIN ASPART 2 UNITS: 100 INJECTION, SOLUTION INTRAVENOUS; SUBCUTANEOUS at 13:45

## 2024-03-03 RX ADMIN — URSODIOL 250 MG: 250 TABLET, FILM COATED ORAL at 21:37

## 2024-03-03 RX ADMIN — INSULIN ASPART 2 UNITS: 100 INJECTION, SOLUTION INTRAVENOUS; SUBCUTANEOUS at 17:54

## 2024-03-03 RX ADMIN — METFORMIN ER 500 MG 1000 MG: 500 TABLET ORAL at 17:54

## 2024-03-03 RX ADMIN — URSODIOL 250 MG: 250 TABLET, FILM COATED ORAL at 09:28

## 2024-03-03 RX ADMIN — PREDNISONE 5 MG: 5 TABLET ORAL at 09:28

## 2024-03-03 RX ADMIN — LEVOTHYROXINE SODIUM 150 MCG: 0.05 TABLET ORAL at 09:28

## 2024-03-03 RX ADMIN — MYCOPHENOLATE MOFETIL 500 MG: 250 CAPSULE ORAL at 09:28

## 2024-03-03 RX ADMIN — MYCOPHENOLATE MOFETIL 500 MG: 250 CAPSULE ORAL at 17:54

## 2024-03-03 RX ADMIN — METFORMIN ER 500 MG 1000 MG: 500 TABLET ORAL at 09:28

## 2024-03-03 RX ADMIN — POTASSIUM CHLORIDE 40 MEQ: 750 TABLET, EXTENDED RELEASE ORAL at 13:44

## 2024-03-03 RX ADMIN — IOPAMIDOL 59 ML: 755 INJECTION, SOLUTION INTRAVENOUS at 12:48

## 2024-03-03 RX ADMIN — METOPROLOL TARTRATE 25 MG: 25 TABLET, FILM COATED ORAL at 21:37

## 2024-03-03 ASSESSMENT — ACTIVITIES OF DAILY LIVING (ADL)
ADLS_ACUITY_SCORE: 23
ADLS_ACUITY_SCORE: 40
ADLS_ACUITY_SCORE: 23
ADLS_ACUITY_SCORE: 40

## 2024-03-03 NOTE — H&P
Admitted/transferred from: [facility]  2 RN full   skin assessment completed by Orlando Neal, DALLIN and Vira Bansal RN.  Skin assessment finding: Skin intact except for abdominal scar tissue from previous surgery.    Interventions/actions: other No intervention needed    Will continue to monitor. Continue to monitor BG as well as urinary and respiratory status and follow plan of care.

## 2024-03-03 NOTE — PROGRESS NOTES
[facility]    Medicine Progress Note - Medicine Service, MARSTEPHANIE TEAM 4    Date of Admission:  2/28/2024    Assessment & Plan   Loy is a 67 y/o male with pmhx notable for alcohol-related cirrhosis c/b HCC s/p DDLT (2018), prostate cancer s/p RALP (1/2020), CKD, hypothyroidism, atrial fibrillation, hypertension & steroid/immunosuppression induced diabetes admitted for acute ESBL E. Coli pyelonephritis & acute hypoxic respiratory failure from community acquired pneumonia     Acute pyelonephritis with ESBL E. Coli  Hx of hemorrhagic cystitis with normal cystoscopy (2021)  Recently in [place] when symptoms started ~2 weeks PTA. Prescribed levofloxacin, did not complete course (arrived 2/28 from [place]) and felt that symptoms have not improved.  CT a/p with findings c/w bilateral pyelonephritis. Hx of prior UTI in 3/2023, grew panS E. Coli but now with MDR/ESBL E Coli   - Continue ertapenem (3/1 - *), anticipate 10-14 day course  - Holding PTA empagliflozin     Acute hypoxic respiratory failure secondary to community acquired pneumonia  hx of latent TB-treated  Dyspnea, productive cough of yellow sputum. Procal 6 but down trending. sputum clx NGTD; legionella and strep pneumo antigen neg; MRSA nares neg  - s/p 3 days of azithromycin  - if not improving clinically consider fungal, atypical, other viral given immunocompromised & recent travel    Hemoptysis  Started on 2/28/24, however did not mention until 3/3/24. Of note DOAC stated 3/1/24 and with ~ 1.5 g in Hgb  - Chest CT     Atrial fibrillation  Elevated cardiac biomarkers  Hx of post op afib previously on metoprolol. EKG on arrival afib w/ rvr in the setting of acute infection. [id number] Score , started on 2.5 mg BID due to hx of hemorrhagic cystitis. Trop peaked. BNP is 15k but echo with normal IVC and patient w/o signs of hypervolemia on exam  - Tele  - Restarted metoprolol 25 mg BID  - Echo 2/9/24  - Hold  apixaban (in light of hemoptysis)    Uncontrolled type 2 diabetes  Follows with endocrinology as an outpatient, recent A1c 9.2%.  - home lantus regimen is 20 units; increase to 25 unit(s) at discharge (in anticipation of hold empag on discharge)  - resume PTA metformin 1000mg BID   - holding home jardiance 10mg 2/2 UTI  - medium correction scale  - hypoglycemia protocol    Hx of cirrhosis c/b HCC s/p DDLT (2018)  Hx of biliary stricture requiring stent, last ERCP 2019  Follows with hepatology, last visit 3/2023. Endorses feeling that his abdomen is distended more than usual. No alcohol use. No tenderness.  CT a/p with no ascites.  - Defer to tx hepatology regarding immunosuppression    Chronic hypothyroidism  - continue PTA levothyroxine 150mcg  - Recheck TSH in 2-4 weeks post d/c       DEREK - resolved  Non gap acidosis   Creatinine 1.26, not far from baseline of ~0.9. Likely pre-renal.   - repeat BMP in AM       CAD s/p PCI to mLAD- not on ASA? should be . Started apixaban this admission. Patient does not know why he is not on aspirin. He gives a h/o hematuria when asked about any h/o bleeding. If tolerates apixaban will recommend adding a baby aspirin which can be done inpatient or outpatient.     Hypertension- HOLD PTA hydralazine BID, continue PTA amlodipine (restarted at 5 mg daily instead of 10 daily)    Hx of prostate cancer s/p RALP (2020)- follows with Urology as outpatient    Hyponatremia - resolved  - CTM    Diet: Low Consistent Carb (45 g CHO per Meal) Diet    DVT Prophylaxis: DOAC, on hold  Cronin Catheter: Not present  Lines: None     Cardiac Monitoring: ACTIVE order. Indication: Tachyarrhythmias, acute (48 hours)  Code Status: Full Code      Clinically Significant Risk Factors              # Hypoalbuminemia: Lowest albumin = 2.5 g/dL at 3/3/2024  5:39 AM, will monitor as appropriate     # Hypertension: Noted on problem list        # Overweight: Estimated body mass index is 29.91 kg/m  as calculated from  "the following:    Height as of this encounter: 1.702 m (5' 7\").    Weight as of this encounter: 86.6 kg (191 lb)., PRESENT ON ADMISSION            Disposition Plan      Expected Discharge Date: 03/04/2024, 12:00 PM    Destination: home   home in 2 days likely     Sushant Wolf DO  Medicine Service, [facility] TEAM 4  M [facility]  Securely message with [employer] (more info)  Text page via [facility] Paging/Directory   See signed in provider for up to date coverage information  ______________________________________________________________________    Interval History   NAEO. NNR. Complains of red streaky sputum, and sputum that is discolored (reddish green). No chest pain, no other symptoms.     Physical Exam   Vital Signs: Temp: 98.2  F (36.8  C) Temp src: Oral BP: (!) 147/79 Pulse: 82   Resp: 18 SpO2: 92 % O2 Device: None (Room air) Oxygen Delivery: 4 LPM  Weight: 191 lbs 0 oz    General Appearance: Well built and nourished, comfortable at rest, no acute CP distress  Respiratory: CTA bl  Cardiovascular: soft NT  GI: Distended, but soft, non tender  Skin: No lesions on visible skin  Other: Aaox3 moving all 4 ext spont     "

## 2024-03-03 NOTE — PLAN OF CARE
Pt is alert and oriented x 4, Colombian speaking, vitally stable on room air and sats 95%. Calm, pleasant and cooperative with cares/. , voiding spontaneously using urinal, LBM 3/2/24, Right PIV saline locked between antibiotics. Telemetry monitoring shows atrial fibrillation. Denies pain, strong productive cough, with lung sound wheezes in upper lungs, and deminished in the bases. Intrerpreter required. Skin is intact except for scar tissue from previous surgery.     Goal Outcome Evaluation:      Plan of Care Reviewed With: patient, spouse    Overall Patient Progress: no changeOverall Patient Progress: no change    Outcome Evaluation: Colombian speaking, , voiding spontaneously using urinal, LBM 3/2/24, Right PIV saline locked between antibiotics. Telemetry monitoring shows atrial fibrillation. Denies pain, strong productive cough, with lung sound wheezes in upper lungs, and deminished in the bases, but sats 95% on room air. Intrerpreter required. Skin is intact except for scar tissue from previous surgery.

## 2024-03-03 NOTE — PLAN OF CARE
"Status [id number]    /82   Pulse 102   Temp 98.2  F (36.8  C) (Oral)   Resp 18   Ht 1.702 m (5' 7\")   Wt 86.6 kg (191 lb)   SpO2 96%   BMI 29.91 kg/m      A&Ox4. Tajik speaking. Up SBA, using urinal independently at bedside. VSS, afebrile. On 4L O2. Afib on tele. Blood sugars elevated this morning, 1x dose 5units lantus ordered, pt to resume PTA lantus dose this evening. Additional dose of metoprolol 12.5mg given to help better control HR. Remains on IV abx.     "

## 2024-03-04 ENCOUNTER — APPOINTMENT (OUTPATIENT)
Dept: INTERPRETER SERVICES | Facility: CLINIC | Age: 69
DRG: 193 | End: 2024-03-04
Payer: COMMERCIAL

## 2024-03-04 LAB
ACANTHOCYTES BLD QL SMEAR: NORMAL
ALBUMIN SERPL BCG-MCNC: 2.4 G/DL (ref 3.5–5.2)
ALP SERPL-CCNC: 308 U/L (ref 40–150)
ALT SERPL W P-5'-P-CCNC: 30 U/L (ref 0–70)
ANION GAP SERPL CALCULATED.3IONS-SCNC: 9 MMOL/L (ref 7–15)
AST SERPL W P-5'-P-CCNC: 31 U/L (ref 0–45)
AUER BODIES BLD QL SMEAR: NORMAL
BACTERIA BLD CULT: NO GROWTH
BACTERIA BLD CULT: NO GROWTH
BASO STIPL BLD QL SMEAR: NORMAL
BASOPHILS # BLD AUTO: 0 10E3/UL (ref 0–0.2)
BASOPHILS NFR BLD AUTO: 0 %
BILIRUB DIRECT SERPL-MCNC: <0.2 MG/DL (ref 0–0.3)
BILIRUB SERPL-MCNC: 0.3 MG/DL
BITE CELLS BLD QL SMEAR: NORMAL
BLISTER CELLS BLD QL SMEAR: NORMAL
BUN SERPL-MCNC: 12.8 MG/DL (ref 8–23)
BURR CELLS BLD QL SMEAR: NORMAL
CALCIUM SERPL-MCNC: 7.9 MG/DL (ref 8.8–10.2)
CHLORIDE SERPL-SCNC: 107 MMOL/L (ref 98–107)
CREAT SERPL-MCNC: 0.8 MG/DL (ref 0.67–1.17)
DACRYOCYTES BLD QL SMEAR: NORMAL
DEPRECATED HCO3 PLAS-SCNC: 22 MMOL/L (ref 22–29)
EGFRCR SERPLBLD CKD-EPI 2021: >90 ML/MIN/1.73M2
ELLIPTOCYTES BLD QL SMEAR: NORMAL
EOSINOPHIL # BLD AUTO: 0.3 10E3/UL (ref 0–0.7)
EOSINOPHIL NFR BLD AUTO: 3 %
ERYTHROCYTE [DISTWIDTH] IN BLOOD BY AUTOMATED COUNT: 13.5 % (ref 10–15)
FRAGMENTS BLD QL SMEAR: NORMAL
GLUCOSE BLDC GLUCOMTR-MCNC: 172 MG/DL (ref 70–99)
GLUCOSE BLDC GLUCOMTR-MCNC: 187 MG/DL (ref 70–99)
GLUCOSE BLDC GLUCOMTR-MCNC: 194 MG/DL (ref 70–99)
GLUCOSE BLDC GLUCOMTR-MCNC: 194 MG/DL (ref 70–99)
GLUCOSE BLDC GLUCOMTR-MCNC: 237 MG/DL (ref 70–99)
GLUCOSE SERPL-MCNC: 122 MG/DL (ref 70–99)
HCT VFR BLD AUTO: 36.8 % (ref 40–53)
HGB BLD-MCNC: 12.1 G/DL (ref 13.3–17.7)
HGB C CRYSTALS: NORMAL
HOWELL-JOLLY BOD BLD QL SMEAR: NORMAL
IMM GRANULOCYTES # BLD: 0.1 10E3/UL
IMM GRANULOCYTES NFR BLD: 1 %
LDH SERPL L TO P-CCNC: 179 U/L (ref 0–250)
LYMPHOCYTES # BLD AUTO: 1.5 10E3/UL (ref 0.8–5.3)
LYMPHOCYTES NFR BLD AUTO: 18 %
MCH RBC QN AUTO: 28.5 PG (ref 26.5–33)
MCHC RBC AUTO-ENTMCNC: 32.9 G/DL (ref 31.5–36.5)
MCV RBC AUTO: 87 FL (ref 78–100)
MONOCYTES # BLD AUTO: 0.5 10E3/UL (ref 0–1.3)
MONOCYTES NFR BLD AUTO: 6 %
NEUTROPHILS # BLD AUTO: 5.8 10E3/UL (ref 1.6–8.3)
NEUTROPHILS NFR BLD AUTO: 72 %
NEUTS HYPERSEG BLD QL SMEAR: NORMAL
NRBC # BLD AUTO: 0 10E3/UL
NRBC BLD AUTO-RTO: 0 /100
NT-PROBNP SERPL-MCNC: 8913 PG/ML (ref 0–900)
PLAT MORPH BLD: NORMAL
PLATELET # BLD AUTO: 405 10E3/UL (ref 150–450)
POLYCHROMASIA BLD QL SMEAR: NORMAL
POTASSIUM SERPL-SCNC: 3.8 MMOL/L (ref 3.4–5.3)
PROT SERPL-MCNC: 5.5 G/DL (ref 6.4–8.3)
RBC # BLD AUTO: 4.25 10E6/UL (ref 4.4–5.9)
RBC AGGLUT BLD QL: NORMAL
RBC MORPH BLD: NORMAL
ROULEAUX BLD QL SMEAR: NORMAL
SICKLE CELLS BLD QL SMEAR: NORMAL
SMUDGE CELLS BLD QL SMEAR: NORMAL
SODIUM SERPL-SCNC: 138 MMOL/L (ref 135–145)
SPHEROCYTES BLD QL SMEAR: NORMAL
STOMATOCYTES BLD QL SMEAR: NORMAL
TARGETS BLD QL SMEAR: NORMAL
TOXIC GRANULES BLD QL SMEAR: NORMAL
VARIANT LYMPHS BLD QL SMEAR: NORMAL
WBC # BLD AUTO: 8.1 10E3/UL (ref 4–11)

## 2024-03-04 PROCEDURE — 86606 ASPERGILLUS ANTIBODY: CPT | Performed by: INTERNAL MEDICINE

## 2024-03-04 PROCEDURE — 83880 ASSAY OF NATRIURETIC PEPTIDE: CPT

## 2024-03-04 PROCEDURE — 36415 COLL VENOUS BLD VENIPUNCTURE: CPT

## 2024-03-04 PROCEDURE — 250N000011 HC RX IP 250 OP 636: Mod: JZ

## 2024-03-04 PROCEDURE — 36415 COLL VENOUS BLD VENIPUNCTURE: CPT | Performed by: INTERNAL MEDICINE

## 2024-03-04 PROCEDURE — 87449 NOS EACH ORGANISM AG IA: CPT | Performed by: INTERNAL MEDICINE

## 2024-03-04 PROCEDURE — 80053 COMPREHEN METABOLIC PANEL: CPT

## 2024-03-04 PROCEDURE — 87305 ASPERGILLUS AG IA: CPT | Performed by: INTERNAL MEDICINE

## 2024-03-04 PROCEDURE — 120N000002 HC R&B MED SURG/OB UMMC

## 2024-03-04 PROCEDURE — 250N000013 HC RX MED GY IP 250 OP 250 PS 637

## 2024-03-04 PROCEDURE — 99232 SBSQ HOSP IP/OBS MODERATE 35: CPT | Performed by: INTERNAL MEDICINE

## 2024-03-04 PROCEDURE — 250N000013 HC RX MED GY IP 250 OP 250 PS 637: Performed by: INTERNAL MEDICINE

## 2024-03-04 PROCEDURE — 83615 LACTATE (LD) (LDH) ENZYME: CPT | Performed by: INTERNAL MEDICINE

## 2024-03-04 PROCEDURE — 250N000012 HC RX MED GY IP 250 OP 636 PS 637: Performed by: STUDENT IN AN ORGANIZED HEALTH CARE EDUCATION/TRAINING PROGRAM

## 2024-03-04 PROCEDURE — 85025 COMPLETE CBC W/AUTO DIFF WBC: CPT

## 2024-03-04 PROCEDURE — 99223 1ST HOSP IP/OBS HIGH 75: CPT | Performed by: INTERNAL MEDICINE

## 2024-03-04 PROCEDURE — 250N000012 HC RX MED GY IP 250 OP 636 PS 637

## 2024-03-04 RX ADMIN — METOPROLOL TARTRATE 25 MG: 25 TABLET, FILM COATED ORAL at 08:24

## 2024-03-04 RX ADMIN — URSODIOL 250 MG: 250 TABLET, FILM COATED ORAL at 08:23

## 2024-03-04 RX ADMIN — Medication 50 MCG: at 08:25

## 2024-03-04 RX ADMIN — AMLODIPINE BESYLATE 5 MG: 5 TABLET ORAL at 08:24

## 2024-03-04 RX ADMIN — INSULIN ASPART 2 UNITS: 100 INJECTION, SOLUTION INTRAVENOUS; SUBCUTANEOUS at 17:37

## 2024-03-04 RX ADMIN — METFORMIN ER 500 MG 1000 MG: 500 TABLET ORAL at 08:22

## 2024-03-04 RX ADMIN — URSODIOL 250 MG: 250 TABLET, FILM COATED ORAL at 21:11

## 2024-03-04 RX ADMIN — METFORMIN ER 500 MG 1000 MG: 500 TABLET ORAL at 17:37

## 2024-03-04 RX ADMIN — PREDNISONE 5 MG: 5 TABLET ORAL at 08:24

## 2024-03-04 RX ADMIN — MYCOPHENOLATE MOFETIL 500 MG: 250 CAPSULE ORAL at 08:24

## 2024-03-04 RX ADMIN — Medication 12.5 MG: at 21:11

## 2024-03-04 RX ADMIN — ATORVASTATIN CALCIUM 40 MG: 40 TABLET, FILM COATED ORAL at 08:23

## 2024-03-04 RX ADMIN — ERTAPENEM SODIUM 1 G: 1 INJECTION, POWDER, LYOPHILIZED, FOR SOLUTION INTRAMUSCULAR; INTRAVENOUS at 02:11

## 2024-03-04 RX ADMIN — MYCOPHENOLATE MOFETIL 500 MG: 250 CAPSULE ORAL at 17:37

## 2024-03-04 RX ADMIN — LEVOTHYROXINE SODIUM 150 MCG: 0.05 TABLET ORAL at 08:24

## 2024-03-04 RX ADMIN — INSULIN ASPART 2 UNITS: 100 INJECTION, SOLUTION INTRAVENOUS; SUBCUTANEOUS at 09:31

## 2024-03-04 ASSESSMENT — ACTIVITIES OF DAILY LIVING (ADL)
ADLS_ACUITY_SCORE: 23
DEPENDENT_IADLS:: INDEPENDENT
ADLS_ACUITY_SCORE: 23

## 2024-03-04 NOTE — PROGRESS NOTES
Pulmonary Note    I was paged at 8:20 pm regarding findings on a CT chest w/contrast ordered for evaluation of small volume hemoptysis. In brief, this is a 67 y/o M with h/o EtOH cirrhosis c/b HCC s/p DDLT in 2018 on low dose prednisone, also h/o prostate Ca s/p RALP, CKD and atrial fibrillation started on DOAC who is admitted for acute pyelonephritis from ESBL E.coli.  Patient admitted during his hospitalization that his hemoptysis began around 2/28, prior to initiating anticoagulation on 3/1 for his atrial fibrillation.     Lab workup is notable for a 2 gram hemoglobin drop since admission (13.6 --> 11.5 today), though patient's hemoptysis remains stable and he has no oxygen requirements. A CT chest was ordered, and demonstrated the following:                                                                          IMPRESSION:   Bilateral perihilar predominant ground glass opacities, this is  nonspecific and may represent alveolar hemorrhage, infectious or  inflammatory etiology. Recommend follow-up to clearance.    In light of patient's CT findings, persistent hemoptysis, immunosuppressed history, and recent hemoglobin drop, I think it is reasonable to have Pulmonary follow up tomorrow as a formal consult and determine if further workup is needed (IE additional labs v consideration for bronch or outpatient follow up, given his stability). If this is DAH, then I suspect infection is the driving cause of this process and a bronch is warranted.    Usman De La Garza MD  Pulmonary/[facility] [place]

## 2024-03-04 NOTE — PROGRESS NOTES
[facility]    Progress Note - Medicine Service, PATRICIO TEAM 4       Date of Admission:  2/28/2024    Assessment & Plan   Loy is a 69 y/o male with pmhx notable for alcohol-related cirrhosis c/b HCC s/p DDLT (2018), prostate cancer s/p RALP (1/2020), CKD, hypothyroidism, atrial fibrillation, hypertension & steroid/immunosuppression induced diabetes admitted for acute pyelonephritis & community acquired pneumonia      Today  - Continue ertapenem (abx course tentatively 14 days; consider transition to PO on discharge)  - Pulmonology consult for evaluation of hemoptysis  - NPO at midnight for bronchoscopy 3/5/24  - AFB sputum x 3   - Decrease lopressor to 12.5 mg BID    Hemoptysis  Bilateral GGO  Community acquired pneumonia  Hx of latent TB- treated  Endorsed dyspnea, productive cough of yellow sputum. Procal 6. CXR with consolidation noted in RML and R/LLL opacities. On room air. Started on azithro and ceftriaxone. Eventually switched to ertapenem to cover ESBL E.coli as per below. After starting eliquis 2.5 mg BID he started having streaks of blood in his sputum. His oxygen requirements have been stable. CT chest obtained and demonstrated bilateral GGO that could be alveolar hemorrhage vs infection. Sputum culture no growth. Legionella, strep pneumo, coccidiodes, aspergillus, fungal antibodies, 1,3 beta D glucan, and respiratory panel negative. Discussed with pulmonology. Plan for bronchoscopy 3/5/24 and TB evaluation.  - Pulmonology consulted  - Antibiotics:   > Azithromycin (2/28-3/1)   > Ceftriaxone (2/28-2/29)   > Ertapenem (3/1-p)  - Hold anticoagulation  - AFB x 3   - Airbone precautions  - NPO at midnight for bronchoscopy 3/5/24    Acute pyelonephritis d/t ESBL E.coli  Hx of hemorrhagic cystitis with normal cystoscopy (2021)  Endorses dysuria, bilateral flank pain, fevers, nausea/vomiting, reduced appetite. Recently in [place] when symptoms started ~2  weeks ago. Prescribed levofloxacin, did not complete course and feels that symptoms have not improved.  CT a/p with findings c/w bilateral pyelonephritis. Hx of prior UTI in 3/2023, grew panS E. Coli. Urine culture from admission with ESBL E.coli (sensitive to bactrim).  - Continue ertapenem (3/1-p); anticipate 14 day course of antibiotics     Atrial fibrillation  Hx of post op afib previously on metoprolol (no longer) . EKG on arrival afib w/ rvr in the setting of acute infection. TTE 2/28/24 with normal LVEF 60-65%, moderate mitral annular calcification with trace insufficiency, and trace TV insufficiency. CHADSVASC 3- Started on apixaban 2.5 mg BID but developed hemoptysis (as per above). Holding for now. Converted to sinus. Suspect AF triggered by infection. Will continue lopressor for now but decrease to 12.5 mg BID from 25 BID given HR in the 60s.  - Decrease lopressor to 12.5 mg BID  - Hold eliquis as per above     Hx of cirrhosis c/b HCC s/p DDLT (2018)  Hx of biliary stricture requiring stent, last ERCP 2019  Follows with hepatology, last visit 3/2023. Endorses feeling that his abdomen is distended more than usual. No alcohol use. No tenderness.  CT a/p with no ascites.  - Defer to tx hepatology regarding immunosuppression     Hyponatremia - resolved  - CTM     DEREK - resolved  Non gap acidosis   Creatinine 1.26, not far from baseline of ~0.9. Likely pre-renal. Resolved with IVF.  - Daily BMP     Type 2 diabetes  In the setting of steroids/immunosuppression. Follows with endocrinology as an outpatient, recent A1c 9.2%.  - Cont lantus 25 U QHS  - Cont PTA metformin 1000mg BID   - Hold PTA jardiance 10 mg 2/2 UTI  - JOANIE  - hypoglycemia protocol     Elevated troponin-peaked  Demand ischemia  45-->43 in the setting of afib w/ rvr, infection. No chest pain. EKG with no acute ischemic changes. Suspect demand ischemia.  - CTM     CAD s/p PCI to mLAD- not on ASA but should be. Started apixaban this admission but  "had to hold given hemoptysis. Patient does not know why he is not on aspirin. Per last cardiology note he was to continue on 81 mg daily. Hx of hematuria and now hemoptysis but no other bleeding events otherwise.  - Consider starting ASA 81 mg daily once hemoptysis resolved     Hypertension- HOLD PTA hydralazine BID, continue PTA amlodipine (restarted at 5 mg daily instead of 10 daily)     Hypothyroidism- continue PTA levothyroxine 150 mcg     Hx of prostate cancer s/p RALP (2020)- follows with Urology as outpatient        Diet: Low Consistent Carb (45 g CHO per Meal) Diet  NPO per Anesthesia Guidelines for Procedure/Surgery Except for: Meds    DVT Prophylaxis: Pneumatic Compression Devices  Cronin Catheter: Not present  Fluids: none  Lines: None     Cardiac Monitoring: ACTIVE order. Indication: Tachyarrhythmias, acute (48 hours)  Code Status: Full Code      Clinically Significant Risk Factors              # Hypoalbuminemia: Lowest albumin = 2.4 g/dL at 3/4/2024  6:26 AM, will monitor as appropriate     # Hypertension: Noted on problem list        # Overweight: Estimated body mass index is 29.91 kg/m  as calculated from the following:    Height as of this encounter: 1.702 m (5' 7\").    Weight as of this encounter: 86.6 kg (191 lb).             Disposition Plan      Expected Discharge Date: 03/06/2024      Destination: home  Discharge Comments: Dispo: TBD  Plan: ertapenem (3/1 - *) x10-14d  Progress: tele; Chest CT; Weaned to RA        The patient's care was discussed with the Attending Physician, Dr. Chacon .    Vani Stapleton MD  Medicine Service, [address]  Securely message with [employer] (more info)  Text page via [facility] Paging/Directory   See signed in provider for up to date coverage information  ______________________________________________________________________    Interval History   CT chest obtained overnight and notable for bilateral GGO. " Ongoing mild hemoptysis (streaks of blood in sputum). Feels well otherwise.    Physical Exam   Vital Signs: Temp: 97.7  F (36.5  C) Temp src: Oral BP: 121/62 Pulse: 66   Resp: 16 SpO2: 95 % O2 Device: None (Room air)    Weight: 191 lbs 0 oz    Constitutional: awake, alert, cooperative, no apparent distress, and appears stated age  Eyes: lids and lashes normal, extra-ocular muscles intact, and sclera clear  Respiratory: CTAB, no rhonchi, crackles  Cardiovascular: RRR  GI: non-distended, non-tender  Skin: no bruising or bleeding  Musculoskeletal: no lower extremity pitting edema present  Neurologic: Mental Status Exam:  Level of Alertness:   awake  Orientation:   person, place, time    Medical Decision Making       Please see A&P for additional details of medical decision making.      Data     I have personally reviewed the following data over the past 24 hrs:    8.1  \   12.1 (L)   / 405     138 107 12.8 /  187 (H)   3.8 22 0.80 \     ALT: 30 AST: 31 AP: 308 (H) TBILI: 0.3   ALB: 2.4 (L) TOT PROTEIN: 5.5 (L) LIPASE: N/A     Trop: N/A BNP: 8,913 (H)     Ferritin:  N/A % Retic:  N/A LDH:  179       Imaging results reviewed over the past 24 hrs:   No results found for this or any previous visit (from the past 24 hour(s)).

## 2024-03-04 NOTE — CONSULTS
Care Management Initial Consult    General Information  Assessment completed with: Patient, Other (),    Type of CM/SW Visit: Initial Assessment  Primary Care Provider verified and updated as needed: Yes   Readmission within the last 30 days: no previous admission in last 30 days   Reason for Consult: discharge planning  Advance Care Planning, Reviewed: no concerns identified        Communication Assessment  Patient's communication style: spoken language (non-English)    Hearing Difficulty or Deaf: no   Wear Glasses or Blind: no    Cognitive  Cognitive/Neuro/Behavioral: WDL,    Living Environment:   People in home: spouse     Current living Arrangements: house      Able to return to prior arrangements: yes     Family/Social Support:  Care provided by: spouse/significant other  Provides care for: no one  Marital Status:   Support System: Children, Wife          Description of Support System: Supportive, Involved       Current Resources:   Patient receiving home care services: No  Community Resources: None  Equipment currently used at home: none  Supplies currently used at home: None    Employment/Financial:  Employment Status: employed part-time      Financial Concerns:  no   Referral to Financial Worker: No  Does the patient's insurance plan have a 3 day qualifying hospital stay waiver?  No    Lifestyle & Psychosocial Needs:  Social Determinants of Health     Food Insecurity: Not on file   Depression: Not at risk (2/6/2024)    PHQ-2     PHQ-2 Score: 0   Housing Stability: Not on file   Tobacco Use: Medium Risk (2/6/2024)    Patient History     Smoking Tobacco Use: Former     Smokeless Tobacco Use: Never     Passive Exposure: Not on file   Financial Resource Strain: Not on file   Alcohol Use: Not At Risk (12/16/2019)    AUDIT-C     Frequency of Alcohol Consumption: Never     Average Number of Drinks: Not on file     Frequency of Binge Drinking: Not on file   Transportation Needs: Not on file    Physical Activity: Not on file   Interpersonal Safety: Not At Risk (4/27/2023)    Humiliation, Afraid, Rape, and Kick questionnaire     Fear of Current or Ex-Partner: No     Emotionally Abused: No     Physically Abused: No     Sexually Abused: No   Stress: Not on file   Social Connections: Not on file     Functional Status:  Prior to admission patient needed assistance:   Dependent ADLs:: Independent  Dependent IADLs:: Independent     Mental Health Status:  Mental Health Status: No Current Concerns       Chemical Dependency Status:  Chemical Dependency Status: No Current Concerns           Values/Beliefs:  Spiritual, Cultural Beliefs, Mandaeism Practices, Values that affect care: no       Cultural/Mandaeism Practices Patient Routinely Participates In: prayer       Additional Information:  CMA completed via bedside phone and use of telephonic  ([place]), due to current Airborne precautions as to rule out TB. Pt confirmed listed address ([address]) and declined to clarify physical address, payer source (IMM yes); and PCP (O - [facility]). Pt resides at home with his wife, Drea, and remains IND in ADLs, including driving; pt reports to work part-time. Denies use of home DME, home care services, or regular OP services. Pt able to make needs known, anticipate home disposition; family likely available for transportation support. CM team to continue to follow and support safe discharge planning.       Herbie Fierro RN BSN  7C RNCC  Phone: [phone]  Pager: [phone]    For Weekend & Holiday on call RN Care Coordinator:  (Tasks: Home care, home infusion, medical equipment, transportation, IMM & CACERES forms, etc.)  [place] (0800 - 1630) Saturday and Sunday    Units: 5A, 5B, & 5C: [phone]    Units: 6B, 6C, 6D: [phone]    Units: 7A, 7B, 7C, 7D: [phone]    Units: 6A/ICU : [phone]    [facility] ([phone]) Saturday and Sunday    Units: 6 Med/Surg, 8A, 10 ICU, & Pediatric Units:  [phone]    Units: 5 Ortho, 5Med/Surg & WB ED: [phone]

## 2024-03-04 NOTE — CONSULTS
PULMONARY CONSULTATION     Patient:  Loy Limon   Date of birth 1955, Medical record number [id number]  Date of Visit:  2024  Date of Admission: 2024  Consult Requester: Jessica Chacon MD  Reason for Consult: New onset hemoptysis           Assessment and Recommendations:     ASSESSMENT:  Mr. Ashley Limon is a 69 y/o male with PMHx notable for alcohol-related cirrhosis c/b HCC s/p DDLT (), prostate cancer s/p RALP (2020), CKD, hypothyroidism, atrial fibrillation, hypertension, H/o latent TB- treated, & steroid/MMF immunosuppression presenting with community acquired pneumonia and bilateral ESBL E. Coli pyelonephritis since about 2 weeks. He has been having blood tinged sputum since 2 weeks, however no aydin bleeding. He was recently started on DOACs, however has been having this blood tinged sputum even prior to starting it, hence seemingly unrelated. Complains of significant weight loss as well. No significant contact history except attending a  in [place] recently.    PROBLEM LIST AND DISCUSSION:   # Hemoptysis  # Community acquired pneumonia  # Bilateral GGOs on CXR and CT- likely infection  # H/o latent TB- treated (Quantiferon TB positive in 2018)  # Chronic immunosuppression with MMF, steroids  This patient has been having fever with cough and dyspnea since 2 weeks, now much better. Cough is associated with minimally blood tinged sputum, however no aydin bleeding.   CXR (, 3/2) suggestive of left lower lobe opacity, possibly infectious. CT chest shows bilateral perihilar predominant GGOs, likely to be infectious given the patient's history. In fact, CT chest 3/3 shows inyterval worsening of GGOs as compared to that on . No obvious signs of pulmonary TB on CXR or TB.   Respiratory panel testing was negative for viruses, chlamydia, mycoplasma, pneumococus, legionella, and nasal swab negative for Staph aureus. Sputum cultures growing only normal luc. Blood cultures-  NGTD. Procal elevated, but currently trending down.   In the setting of immunosuppressed state 2/2 Mycophenolate and steroids, our differentials at this point include:  -PJP  -Other pulmonary mycoses (especially Coccidioidomycosis, given recent travel to [place])   -TB reactivation (least likely since latent TB was treated prior to transplant)     Would recommend strict airborne isolation precautions given the possibility of TB. TB and fungal work-up not done, hence will be recommending- already ordered by us.     # Bilateral ESBL E. Coli pyelonephritis  # Afib with RVR iso acute infection  # Elevated troponin  # Alcohol-related cirrhosis c/b HCC s/p DDLT (2018)  #  Prostate cancer s/p RALP (1/2020)  # CKD  # HTN  # Hypothyroidism  # CAD s/p PCI to mLAD       RECOMMENDATION:  Airborne isolation precautions  Sputum AFB x3 (stain and cultures)  Fungal studies (ordered)  NPO from midnight for bronchoscopy tomorrow    Thank you for this consult. Pulmonary consults team will continue to follow.     Patient was discussed with Dr. Jimmy Farley.     Ezekiel FERNANDO  Medical Student    ________________________________________________________________    Consult Question: New onset hemoptysis .  Admission Diagnosis: Pyelonephritis, acute [N10]  Acute UTI [N39.0]  DEREK (acute kidney injury) (H24) [N17.9]  Fever in adult [R50.9]  Community acquired pneumonia of left lower lobe of lung [J18.9]         History of Present Illness:   Latvian speaking phone  services were used for this patient encounter.    Mr. Loy Limon is a 69 y/o male with PMHx notable for alcohol-related cirrhosis c/b HCC s/p DDLT (2018), prostate cancer s/p RALP (1/2020), CKD, hypothyroidism, atrial fibrillation, hypertension & steroid/immunosuppression who presented to the ED with complaints of fever, cough, SOB, bilateral flank pain and burning micturition since about 2 weeks.      Recently he flew in from [place], was there for a short time around 15  days for attending his uncle's . He had previously been to [place] a couple of months ago, and stayed for up to 3 months, visiting [facility] mainly. Recounts consuming raw shrimp. Notes that over the last 2 weeks or so has increasing felt feverish with chills and night sweats. Also reports increasing dysuria associated with bilateral flank pain, which he feels is similar to previous episode of UTI (though worse). However, these symptoms are much better now after being treated. He had initially visited a hospital while in [place] and received levaquin (unclear how long he took for) but feels that his symptoms did not improve. He had already booked a flight to [place] and therefore flew back and presented to the ED here. Also he said he lost around 12 pounds over the past 2 weeks, and that he hasn't been eating well.     Additionally reports feeling short of breath with associated productive cough. Says that he has been having blood tinged sputum since the beginning of his cough in [place]. Denies aydin bleeding in the sputum. No known sick contacts though again with recent travel to [place] for family member's . No exposure to any animals or birds, no pets at home. Also endorsed increasing abdominal bloating/distension with no associated bowel movement changes. Denies hematochezia or melena.  No missed medications.     Currently he is afebrile. His urinary symptoms have mostly resolved. He is not short of breath anymore. There are no other complaints apart from cough and minimally blood tinged sputum.            Review of Systems:   CONSTITUTIONAL:  No fevers or chills currently, previously present  EYES: negative for icterus  ENT:  negative for hearing loss, tinnitus and sore throat  RESPIRATORY:  Cough with sputum and dyspnea, blood tinged sputum  CARDIOVASCULAR:  some chest pain on coughing, not cardiac type  GASTROINTESTINAL:  negative for nausea, vomiting, diarrhea and constipation  GENITOURINARY:   negative for dysuria, previously present  HEME:  No easy bruising  INTEGUMENT:  negative for rash and pruritus  NEURO:  Negative for headache         Past Medical History:     Past Medical History:   Diagnosis Date    Anemia     Biliary stricture of transplanted liver (H) 2018    BPH (benign prostatic hyperplasia)     Cancer (H)     hepatocellualr carcinoma    Cirrhosis of liver (H) 2018    Diabetes (H)     Enlarged prostate     Hypothyroidism     Impotence of organic origin 2020    Inguinal hernia     Repaired with mesh on 18    Liver lesion 2018    Liver transplanted (H) 2018     donor liver transplant    Portal vein thrombosis     on path explant    Postoperative atrial fibrillation (H) 2018    Prostate cancer (H)     TB lung, latent     Treated             Past Surgical History:     Past Surgical History:   Procedure Laterality Date    APPENDECTOMY      COLONOSCOPY          CV CORONARY ANGIOGRAM N/A 10/13/2021    Procedure: CV CORONARY ANGIOGRAM;  Surgeon: Yaya Hampton MD;  [facility]    CV CORONARY LITHOTRIPSY PCI N/A 10/13/2021    Procedure: CV Coronary Lithotripsy PCI;  Surgeon: Yaya Hampton MD;  [facility]    CV INSTANTANEOUS WAVE-FREE RATIO N/A 10/13/2021    Procedure: Instantaneous Wave-Free Ratio;  Surgeon: Yaya Hampton MD;  [facility]    CV INTRAVASULAR ULTRASOUND N/A 10/13/2021    Procedure: Intravascular Ultrasound;  Surgeon: Yaya Hampton MD;  [facility]    CV PCI STENT DRUG ELUTING N/A 10/13/2021    Procedure: Percutaneous Coronary Intervention Stent Drug Eluting;  Surgeon: Yaya Hampton MD;  [facility]    DAVINCI PROSTATECTOMY N/A 1/3/2020    Procedure: Robot-assisted laparoscopic radical prostatectomy, Bladder biopsy;  Surgeon: Stephenie  "Kenrick Coronado MD;  [facility]    ENDOSCOPIC RETROGRADE CHOLANGIOPANCREATOGRAM N/A 2018    Procedure: ENDOSCOPIC RETROGRADE CHOLANGIOPANCREATOGRAM, with Billary Sphincterotomy and Billary Stent Placement;  Surgeon: Omero Lawler MD;  [facility]    ENDOSCOPIC RETROGRADE CHOLANGIOPANCREATOGRAM  2019    Procedure: COMBINED ENDOSCOPIC RETROGRADE CHOLANGIOPANCREATOGRAPHY, Bile Duct Stent Exchange;  Surgeon: Omero Lawler MD;  [facility]    ENDOSCOPIC RETROGRADE CHOLANGIOPANCREATOGRAM N/A 2019    Procedure: ENDOSCOPIC RETROGRADE CHOLANGIOPANCREATOGRAPHY, WITH BILIARY STENT REMOVAL AND STONE EXTRACTION;  Surgeon: Omero Lawler MD;  [facility]    HERNIORRHAPHY INGUINAL  2018    Procedure: Herniorrhaphy inguinal;  Surgeon: Emil Echevarria MD;  [facility]    IR LIVER BIOPSY PERCUTANEOUS  2018    LAPAROTOMY EXPLORATORY      per the patient \"for infection in the LLQ\"     TRANSPLANT LIVER RECIPIENT  DONOR N/A 2018    Procedure: TRANSPLANT LIVER RECIPIENT  DONOR;  Surgeon: Emil Echevarria MD;  [facility]            Family History:   Reviewed and non-contributory.   Family History   Problem Relation Age of Onset    Memory loss Mother     Liver Disease Brother     Skin Cancer No family hx of     Melanoma No family hx of             Social History:     Social History     Tobacco Use    Smoking status: Former     Packs/day: 0.03     Years: 20.00     Additional pack years: 0.00     Total pack years: 0.60     Types: Cigarettes, Cigars     Start date: 1970     Quit date: 3/14/2018     Years since quittin.9    Smokeless tobacco: Never    Tobacco comments:     Quit smoking 2018, one cigarette after dinner   Substance Use Topics    Alcohol use: No     Comment: Quit drinking 2018     History   Sexual Activity    Sexual activity: Not on file            Current Medications:      amLODIPine  5 mg Oral " Daily    [Held by provider] apixaban ANTICOAGULANT  2.5 mg Oral BID    atorvastatin  40 mg Oral Daily    [Held by provider] empagliflozin  10 mg Oral Daily    ertapenem  1 g Intravenous Q24H    [Held by provider] hydrALAZINE  25 mg Oral BID    insulin aspart  1-7 Units Subcutaneous TID AC    insulin aspart  1-5 Units Subcutaneous At Bedtime    insulin glargine  25 Units Subcutaneous At Bedtime    levothyroxine  150 mcg Oral Daily    metFORMIN  1,000 mg Oral BID w/meals    metoprolol tartrate  25 mg Oral BID    mycophenolate  500 mg Oral BID IS    polyethylene glycol  17 g Oral Daily    predniSONE  5 mg Oral Daily    sodium chloride (PF)  3 mL Intracatheter Q8H    ursodiol  250 mg Oral BID    Vitamin D3  50 mcg Oral Daily            Allergies:   No Known Allergies         Physical Exam:   Vitals were reviewed  Patient Vitals for the past 24 hrs:   BP Temp Temp src Pulse Resp SpO2   03/04/24 1040 121/62 97.7  F (36.5  C) -- 66 -- --   03/03/24 2137 (!) 145/71 98.3  F (36.8  C) Oral 83 16 95 %   03/03/24 2122 (!) (P) 145/71 (P) 98.4  F (36.9  C) (P) Oral (P) 80 (P) 20 (P) 95 %   03/03/24 1454 135/74 98.2  F (36.8  C) Oral 94 20 95 %       Physical Examination:  GENERAL:  well-developed, well-nourished, in bed in no acute distress.   HEENT:  Head is normocephalic, atraumatic   EYES:  Eyes have anicteric sclerae without conjunctival injection or stigmata of endocarditis.    ENT:  Oropharynx is moist without exudates or ulcers. Tongue is midline  NECK:  Supple. No cervical lymphadenopathy  LUNGS:  Bilateral basal and posterior middle zone crackles present   CARDIOVASCULAR:  Regular rate and rhythm with no murmurs, gallops or rubs.  ABDOMEN:  Normal bowel sounds, soft, nontender. No appreciable hepatosplenomegaly  SKIN:  No acute rashes.  Line(s) are in place without any surrounding erythema or exudate. No stigmata of endocarditis.  NEUROLOGIC:  Grossly nonfocal. Active x4 extremities         Laboratory Data:      Inflammatory Markers  No lab results found.    Hematology Studies    Recent Labs   Lab Test 03/04/24  0626 03/03/24  0539 03/02/24  0751 03/01/24  0708 02/29/24  0537 02/28/24  2048 07/21/21  1804 07/07/21  1059 03/12/19  0815 03/04/19  1218 02/26/19  0754 12/26/18  0910 12/24/18  0524 12/23/18  1204 12/23/18  0404   WBC 8.1 7.5 8.3 11.1* 7.9 8.9   < > 7.0   < > 3.6* 3.7*   < > 9.3 11.3* 7.2   ANEU  --   --   --   --   --   --   --  4.6  --  2.3 2.1  --  8.4* 10.6* 6.5   AEOS  --   --   --   --   --   --   --  0.2  --  0.1 0.2  --  0.0 0.0 0.0   HGB 12.1* 11.5* 12.9* 12.6* 12.5* 13.6   < > 14.0   < > 11.2* 10.1*   < > 8.0* 8.7* 8.7*   MCV 87 85 85 85 86 84   < > 83   < > 88 89   < > 99 98 97    365 330 290 221 233   < > 208   < > 199 170   < > 62* 53* 50*    < > = values in this interval not displayed.       Metabolic Studies     Recent Labs   Lab Test 03/04/24  0626 03/03/24  0539 03/02/24 0751 03/01/24  0708 02/29/24  0655    137 135 134* 135   POTASSIUM 3.8 3.7 4.1 3.9 3.8   CHLORIDE 107 107 104 103 105   CO2 22 21* 21* 17* 19*   BUN 12.8 17.5 15.6 18.9 22.4   CR 0.80 0.79 0.92 1.01 1.14   GFRESTIMATED >90 >90 89 80 69       Hepatic Studies    Recent Labs   Lab Test 03/04/24  0626 03/03/24  0539 03/02/24  0751 03/01/24  0708 02/29/24  0655 02/28/24  2048   BILITOTAL 0.3 0.3 0.5 0.4 0.3 0.5   ALKPHOS 308* 330* 349* 322* 246* 311*   ALBUMIN 2.4* 2.5* 2.7* 2.7* 2.7* 3.1*   AST 31 37 40 54* 44 58*   ALT 30 39 35 37 29 40       Microbiology:  Culture Micro   Date Value Ref Range Status   02/18/2020   Final    <10,000 colonies/mL  urogenital luc  Susceptibility testing not routinely done     12/16/2019 <10,000 colonies/mL  urogenital luc    Final   12/16/2019 Susceptibility testing not routinely done  Final   10/10/2019 No growth  Final   12/25/2018 (A)  Final    Canceled, Test credited  >10 Squamous epithelial cells/low power field indicates oral contamination. Please   recollect.     12/25/2018   " Final    Notification of test cancellation was given to  Tahira Reis RN at 1000 12.25.18     12/24/2018 No growth  Final   12/24/2018 No growth  Final   12/22/2018 No anaerobes isolated  Final   12/22/2018 No growth  Final   12/22/2018 Culture negative after 30 days  Final   12/22/2018 No VRE isolated  Final       Urine Studies    Recent Labs   Lab Test 02/28/24  2035 09/08/23  2306 07/19/23  1427 05/09/23  0843 03/20/23  1041   LEUKEST Small* Negative Negative Negative Negative   WBCU 49* 1 1 <1 1       Vancomycin Levels    Recent Labs   Lab Test 12/26/18  1046 12/24/18  1128   VANCOMYCIN 15.1 14.4       Hepatitis B Testing   Recent Labs   Lab Test 12/22/18  0013   HBCAB Reactive*     Hepatitis C Testing     Hepatitis C Antibody   Date Value Ref Range Status   12/22/2018 Nonreactive NR^Nonreactive Final     Comment:     Assay performance characteristics have not been established for newborns,   infants, and children       Respiratory Virus Testing    No results found for: \"RS\", \"FLUAG\"     CXR (3/2):  Impression:   1. Left lower lobe opacity may represent infectious pneumonia.  2. Trace pleural effusions.    CXR (2/28):  Impression: Since 07/14/2021, development of a left lower lobe airspace opacity which could be infectious/inflammatory. Remainder not significantly changed. Bibasilar atelectasis/scarring. Normal heart size and pulmonary vasculature. No pleural effusion.  Upper abdominal surgical clips. Degenerative changes both shoulders.    CT abdomen (2/28):   IMPRESSION:   1.  Pneumonia involving the included bases of the right middle lobe and both lower lobes. Trace pleural effusions.  2.  Possible bilateral pyelonephritis. No abscess. Clinical correlation recommended.    CT chest (3/3):   IMPRESSION:   Bilateral perihilar predominant ground glass opacities, this is nonspecific and may represent alveolar hemorrhage, infectious or inflammatory etiology. Recommend follow-up to clearance.            I was " present with the medical student who participated in the service and in the documentation of the note. I have verified the history and personally performed the physical exam and medical decision making. I agree with the assessment and plan of care as documented in the note.    Jimmy Farley MD

## 2024-03-04 NOTE — PLAN OF CARE
Problem: Adult Inpatient Plan of Care  Goal: Plan of Care Review  Description: The Plan of Care Review/Shift note should be completed every shift.  The Outcome Evaluation is a brief statement about your assessment that the patient is improving, declining, or no change.  This information will be displayed automatically on your shift  note.  Flowsheets (Taken 3/4/2024 4537)  Outcome Evaluation:   Ongoing POC   Pt makes needs known,  utilized for CMA   Pt resides at home with spouse and daughter, anticipates d/c to home   CM team to continue to follow for safe d/c planning.  Plan of Care Reviewed With: patient  Overall Patient Progress: no change

## 2024-03-04 NOTE — PLAN OF CARE
Shift: [id number]     D: See flowsheets for full assessment & vitals    PRNs: none  Labs: no RN managed labs, BG ACHS, 2 am (131,201,196)  Running: R PIV SL      A/R: no acute events this shift. Pt remains vitally stable on RA. ENGLISH w/ some expiratory wheezes notes in RUL and infrequent cough. Echo and chest CT completed this shift.  tolerating a low carb diet w/ good appetite. Wife present at bedside throughout the shift. Frisian speaking but understand basic english. BG elevated but stable w/ sliding scale insulin. awaiting safe discharge plans, Q1 hour rounding completed, call light w/in reach and able to make needs known, will continue to monitor     P: continue w/ poc     Goal Outcome Evaluation:      Plan of Care Reviewed With: patient, spouse    Overall Patient Progress: no changeOverall Patient Progress: no change

## 2024-03-04 NOTE — PLAN OF CARE
Goal Outcome Evaluation:      Plan of Care Reviewed With: patient, spouse    Overall Patient Progress: no changeOverall Patient Progress: no change    Outcome Evaluation: Pt has procedure at AM nextday. He will be educated on the instruction for NPO as of midnight, tomight.

## 2024-03-05 LAB
ALBUMIN SERPL BCG-MCNC: 2.4 G/DL (ref 3.5–5.2)
ALP SERPL-CCNC: 297 U/L (ref 40–150)
ALT SERPL W P-5'-P-CCNC: 33 U/L (ref 0–70)
ANION GAP SERPL CALCULATED.3IONS-SCNC: 9 MMOL/L (ref 7–15)
APPEARANCE FLD: CLEAR
AST SERPL W P-5'-P-CCNC: 28 U/L (ref 0–45)
BASOPHILS # BLD AUTO: 0 10E3/UL (ref 0–0.2)
BASOPHILS NFR BLD AUTO: 1 %
BILIRUB DIRECT SERPL-MCNC: <0.2 MG/DL (ref 0–0.3)
BILIRUB SERPL-MCNC: 0.3 MG/DL
BUN SERPL-MCNC: 12.8 MG/DL (ref 8–23)
C PNEUM DNA SPEC QL NAA+PROBE: NOT DETECTED
CALCIUM SERPL-MCNC: 8.1 MG/DL (ref 8.8–10.2)
CELL COUNT BODY FLUID SOURCE: NORMAL
CHLORIDE SERPL-SCNC: 108 MMOL/L (ref 98–107)
COLOR FLD: COLORLESS
CREAT SERPL-MCNC: 0.75 MG/DL (ref 0.67–1.17)
DEPRECATED HCO3 PLAS-SCNC: 20 MMOL/L (ref 22–29)
EGFRCR SERPLBLD CKD-EPI 2021: >90 ML/MIN/1.73M2
EOSINOPHIL # BLD AUTO: 0.3 10E3/UL (ref 0–0.7)
EOSINOPHIL NFR BLD AUTO: 3 %
ERYTHROCYTE [DISTWIDTH] IN BLOOD BY AUTOMATED COUNT: 13.6 % (ref 10–15)
FLUAV H1 2009 PAND RNA SPEC QL NAA+PROBE: NOT DETECTED
FLUAV H1 RNA SPEC QL NAA+PROBE: NOT DETECTED
FLUAV H3 RNA SPEC QL NAA+PROBE: NOT DETECTED
FLUAV RNA SPEC QL NAA+PROBE: NOT DETECTED
FLUBV RNA SPEC QL NAA+PROBE: NOT DETECTED
GALACTOMANNAN AG SERPL QL IA: NEGATIVE
GALACTOMANNAN AG SPEC IA-ACNC: 0.05
GLUCOSE BLDC GLUCOMTR-MCNC: 106 MG/DL (ref 70–99)
GLUCOSE BLDC GLUCOMTR-MCNC: 115 MG/DL (ref 70–99)
GLUCOSE BLDC GLUCOMTR-MCNC: 156 MG/DL (ref 70–99)
GLUCOSE BLDC GLUCOMTR-MCNC: 156 MG/DL (ref 70–99)
GLUCOSE BLDC GLUCOMTR-MCNC: 224 MG/DL (ref 70–99)
GLUCOSE BLDC GLUCOMTR-MCNC: 90 MG/DL (ref 70–99)
GLUCOSE SERPL-MCNC: 105 MG/DL (ref 70–99)
GRAM STAIN RESULT: NORMAL
GRAM STAIN RESULT: NORMAL
HADV DNA SPEC QL NAA+PROBE: NOT DETECTED
HCOV PNL SPEC NAA+PROBE: NOT DETECTED
HCT VFR BLD AUTO: 37.6 % (ref 40–53)
HGB BLD-MCNC: 12 G/DL (ref 13.3–17.7)
HMPV RNA SPEC QL NAA+PROBE: NOT DETECTED
HPIV1 RNA SPEC QL NAA+PROBE: NOT DETECTED
HPIV2 RNA SPEC QL NAA+PROBE: NOT DETECTED
HPIV3 RNA SPEC QL NAA+PROBE: NOT DETECTED
HPIV4 RNA SPEC QL NAA+PROBE: NOT DETECTED
IMM GRANULOCYTES # BLD: 0.1 10E3/UL
IMM GRANULOCYTES NFR BLD: 1 %
KOH PREPARATION: NORMAL
KOH PREPARATION: NORMAL
LYMPHOCYTES # BLD AUTO: 1.5 10E3/UL (ref 0.8–5.3)
LYMPHOCYTES NFR BLD AUTO: 19 %
LYMPHOCYTES NFR FLD MANUAL: 7 %
M PNEUMO DNA SPEC QL NAA+PROBE: NOT DETECTED
MCH RBC QN AUTO: 27.9 PG (ref 26.5–33)
MCHC RBC AUTO-ENTMCNC: 31.9 G/DL (ref 31.5–36.5)
MCV RBC AUTO: 87 FL (ref 78–100)
MONOCYTES # BLD AUTO: 0.6 10E3/UL (ref 0–1.3)
MONOCYTES NFR BLD AUTO: 7 %
MONOS+MACROS NFR FLD MANUAL: 87 %
NEUTROPHILS # BLD AUTO: 5.3 10E3/UL (ref 1.6–8.3)
NEUTROPHILS NFR BLD AUTO: 69 %
NEUTS BAND NFR FLD MANUAL: 6 %
NRBC # BLD AUTO: 0 10E3/UL
NRBC BLD AUTO-RTO: 0 /100
PATH REPORT.COMMENTS IMP SPEC: NORMAL
PATH REPORT.COMMENTS IMP SPEC: NORMAL
PATH REPORT.FINAL DX SPEC: NORMAL
PATH REPORT.GROSS SPEC: NORMAL
PATH REPORT.MICROSCOPIC SPEC OTHER STN: NORMAL
PATH REPORT.RELEVANT HX SPEC: NORMAL
PLATELET # BLD AUTO: 448 10E3/UL (ref 150–450)
POTASSIUM SERPL-SCNC: 3.9 MMOL/L (ref 3.4–5.3)
PROT SERPL-MCNC: 5.5 G/DL (ref 6.4–8.3)
RBC # BLD AUTO: 4.3 10E6/UL (ref 4.4–5.9)
RSV RNA SPEC QL NAA+PROBE: NOT DETECTED
RSV RNA SPEC QL NAA+PROBE: NOT DETECTED
RV+EV RNA SPEC QL NAA+PROBE: NOT DETECTED
SODIUM SERPL-SCNC: 137 MMOL/L (ref 135–145)
WBC # BLD AUTO: 7.7 10E3/UL (ref 4–11)
WBC # FLD AUTO: 125 /UL

## 2024-03-05 PROCEDURE — 99233 SBSQ HOSP IP/OBS HIGH 50: CPT | Mod: GC | Performed by: STUDENT IN AN ORGANIZED HEALTH CARE EDUCATION/TRAINING PROGRAM

## 2024-03-05 PROCEDURE — 87798 DETECT AGENT NOS DNA AMP: CPT | Performed by: INTERNAL MEDICINE

## 2024-03-05 PROCEDURE — 31624 DX BRONCHOSCOPE/LAVAGE: CPT | Mod: GC | Performed by: INTERNAL MEDICINE

## 2024-03-05 PROCEDURE — 84999 UNLISTED CHEMISTRY PROCEDURE: CPT | Performed by: INTERNAL MEDICINE

## 2024-03-05 PROCEDURE — 120N000002 HC R&B MED SURG/OB UMMC

## 2024-03-05 PROCEDURE — 250N000013 HC RX MED GY IP 250 OP 250 PS 637

## 2024-03-05 PROCEDURE — 87210 SMEAR WET MOUNT SALINE/INK: CPT | Performed by: INTERNAL MEDICINE

## 2024-03-05 PROCEDURE — 99233 SBSQ HOSP IP/OBS HIGH 50: CPT | Mod: 25 | Performed by: INTERNAL MEDICINE

## 2024-03-05 PROCEDURE — 87385 HISTOPLASMA CAPSUL AG IA: CPT | Performed by: INTERNAL MEDICINE

## 2024-03-05 PROCEDURE — 250N000011 HC RX IP 250 OP 636

## 2024-03-05 PROCEDURE — 87633 RESP VIRUS 12-25 TARGETS: CPT | Performed by: INTERNAL MEDICINE

## 2024-03-05 PROCEDURE — 0B9H8ZX DRAINAGE OF LUNG LINGULA, VIA NATURAL OR ARTIFICIAL OPENING ENDOSCOPIC, DIAGNOSTIC: ICD-10-PCS | Performed by: INTERNAL MEDICINE

## 2024-03-05 PROCEDURE — 87102 FUNGUS ISOLATION CULTURE: CPT | Performed by: INTERNAL MEDICINE

## 2024-03-05 PROCEDURE — 89050 BODY FLUID CELL COUNT: CPT | Performed by: INTERNAL MEDICINE

## 2024-03-05 PROCEDURE — 87305 ASPERGILLUS AG IA: CPT | Performed by: INTERNAL MEDICINE

## 2024-03-05 PROCEDURE — 31624 DX BRONCHOSCOPE/LAVAGE: CPT | Performed by: INTERNAL MEDICINE

## 2024-03-05 PROCEDURE — 36415 COLL VENOUS BLD VENIPUNCTURE: CPT

## 2024-03-05 PROCEDURE — 87556 M.TUBERCULO DNA AMP PROBE: CPT | Performed by: INTERNAL MEDICINE

## 2024-03-05 PROCEDURE — 80053 COMPREHEN METABOLIC PANEL: CPT

## 2024-03-05 PROCEDURE — 87206 SMEAR FLUORESCENT/ACID STAI: CPT | Performed by: INTERNAL MEDICINE

## 2024-03-05 PROCEDURE — 87206 SMEAR FLUORESCENT/ACID STAI: CPT

## 2024-03-05 PROCEDURE — 87449 NOS EACH ORGANISM AG IA: CPT | Performed by: INTERNAL MEDICINE

## 2024-03-05 PROCEDURE — 250N000009 HC RX 250: Performed by: INTERNAL MEDICINE

## 2024-03-05 PROCEDURE — 250N000011 HC RX IP 250 OP 636: Mod: JZ

## 2024-03-05 PROCEDURE — 88108 CYTOPATH CONCENTRATE TECH: CPT | Mod: TC | Performed by: INTERNAL MEDICINE

## 2024-03-05 PROCEDURE — 250N000012 HC RX MED GY IP 250 OP 636 PS 637: Performed by: STUDENT IN AN ORGANIZED HEALTH CARE EDUCATION/TRAINING PROGRAM

## 2024-03-05 PROCEDURE — 87205 SMEAR GRAM STAIN: CPT | Performed by: INTERNAL MEDICINE

## 2024-03-05 PROCEDURE — 88108 CYTOPATH CONCENTRATE TECH: CPT | Mod: 26 | Performed by: PATHOLOGY

## 2024-03-05 PROCEDURE — 87070 CULTURE OTHR SPECIMN AEROBIC: CPT | Performed by: INTERNAL MEDICINE

## 2024-03-05 PROCEDURE — 99255 IP/OBS CONSLTJ NEW/EST HI 80: CPT | Mod: GC | Performed by: INTERNAL MEDICINE

## 2024-03-05 PROCEDURE — 250N000011 HC RX IP 250 OP 636: Performed by: INTERNAL MEDICINE

## 2024-03-05 PROCEDURE — G0500 MOD SEDAT ENDO SERVICE >5YRS: HCPCS | Performed by: INTERNAL MEDICINE

## 2024-03-05 PROCEDURE — 87081 CULTURE SCREEN ONLY: CPT | Performed by: INTERNAL MEDICINE

## 2024-03-05 PROCEDURE — 88312 SPECIAL STAINS GROUP 1: CPT | Mod: 26 | Performed by: PATHOLOGY

## 2024-03-05 PROCEDURE — 85025 COMPLETE CBC W/AUTO DIFF WBC: CPT

## 2024-03-05 RX ORDER — FENTANYL CITRATE 50 UG/ML
INJECTION, SOLUTION INTRAMUSCULAR; INTRAVENOUS PRN
Status: DISCONTINUED | OUTPATIENT
Start: 2024-03-05 | End: 2024-03-09 | Stop reason: HOSPADM

## 2024-03-05 RX ORDER — DEXTROSE, SODIUM CHLORIDE, SODIUM LACTATE, POTASSIUM CHLORIDE, AND CALCIUM CHLORIDE 5; .6; .31; .03; .02 G/100ML; G/100ML; G/100ML; G/100ML; G/100ML
INJECTION, SOLUTION INTRAVENOUS CONTINUOUS
Status: DISCONTINUED | OUTPATIENT
Start: 2024-03-05 | End: 2024-03-05

## 2024-03-05 RX ORDER — MAGNESIUM HYDROXIDE 1200 MG/15ML
LIQUID ORAL PRN
Status: DISCONTINUED | OUTPATIENT
Start: 2024-03-05 | End: 2024-03-09 | Stop reason: HOSPADM

## 2024-03-05 RX ADMIN — Medication 12.5 MG: at 08:17

## 2024-03-05 RX ADMIN — MYCOPHENOLATE MOFETIL 500 MG: 250 CAPSULE ORAL at 08:18

## 2024-03-05 RX ADMIN — METFORMIN ER 500 MG 1000 MG: 500 TABLET ORAL at 18:31

## 2024-03-05 RX ADMIN — MYCOPHENOLATE MOFETIL 500 MG: 250 CAPSULE ORAL at 18:31

## 2024-03-05 RX ADMIN — Medication 50 MCG: at 08:18

## 2024-03-05 RX ADMIN — Medication 12.5 MG: at 20:32

## 2024-03-05 RX ADMIN — LEVOTHYROXINE SODIUM 150 MCG: 0.05 TABLET ORAL at 08:17

## 2024-03-05 RX ADMIN — SODIUM CHLORIDE, SODIUM LACTATE, POTASSIUM CHLORIDE, CALCIUM CHLORIDE AND DEXTROSE MONOHYDRATE: 5; 600; 310; 30; 20 INJECTION, SOLUTION INTRAVENOUS at 09:39

## 2024-03-05 RX ADMIN — URSODIOL 250 MG: 250 TABLET, FILM COATED ORAL at 20:32

## 2024-03-05 RX ADMIN — URSODIOL 250 MG: 250 TABLET, FILM COATED ORAL at 08:17

## 2024-03-05 RX ADMIN — ATORVASTATIN CALCIUM 40 MG: 40 TABLET, FILM COATED ORAL at 08:17

## 2024-03-05 RX ADMIN — INSULIN ASPART 2 UNITS: 100 INJECTION, SOLUTION INTRAVENOUS; SUBCUTANEOUS at 18:30

## 2024-03-05 RX ADMIN — ERTAPENEM SODIUM 1 G: 1 INJECTION, POWDER, LYOPHILIZED, FOR SOLUTION INTRAMUSCULAR; INTRAVENOUS at 02:14

## 2024-03-05 ASSESSMENT — ACTIVITIES OF DAILY LIVING (ADL)
ADLS_ACUITY_SCORE: 21
ADLS_ACUITY_SCORE: 23
ADLS_ACUITY_SCORE: 21
ADLS_ACUITY_SCORE: 23
ADLS_ACUITY_SCORE: 21
ADLS_ACUITY_SCORE: 23
ADLS_ACUITY_SCORE: 21
ADLS_ACUITY_SCORE: 23
ADLS_ACUITY_SCORE: 21
ADLS_ACUITY_SCORE: 23
ADLS_ACUITY_SCORE: 23
ADLS_ACUITY_SCORE: 21
ADLS_ACUITY_SCORE: 21
ADLS_ACUITY_SCORE: 23
ADLS_ACUITY_SCORE: 21
ADLS_ACUITY_SCORE: 21
ADLS_ACUITY_SCORE: 23
ADLS_ACUITY_SCORE: 23
ADLS_ACUITY_SCORE: 21

## 2024-03-05 NOTE — CONSULTS
[facility]  Transplant Infectious Disease Consult Note - New Patient     Patient:  Loy Limon, Date of birth 1955, Medical record number [id number]  Date of Visit:  03/05/2024  Consult requested by Dr. Alessandra Zaragoza for evaluation of complicated ESBL E. coli with bilateral pyelonephritis         Assessment and Recommendations:   Recommendations:  - Will follow-up BAL cultures  - Final AFB smear/culture to be collected later tonight.   -- Maintain airborne precaution until active pulmonary TB can be fully evaluated  - Continue ertapenem 1 g daily IV     Thank you very much for this consultation. Transplant Infectious Disease will continue to follow with you.    Assessment:  1.  Complicated ESBL E. coli with bilateral pyelonephritis  Urine culture 2/20/2024 with growth of ESBL E. coli (R-floroquinolones) and CT AP 2/28/2024 with cortical hypoenhancement of the bilateral kidneys concerning for pyelonephritis.  He unfortunately has no oral options for this ESBL complicated urinary tract infection.  Because of this, will need ertapenem 1 g daily IV x 14 days (3/1/2024 - 3/14/2024)    2.  Pneumonia  Acute onset of respiratory symptoms over the past 2 weeks with improvement of clinical condition with antibiotics paints a more bacterial process over a fungal etiology.  But given his immunocompromise state, cannot definitively rule out a fungal process. With the reports of hemoptysis per primary team, there is also potential concerns for tuberculosis. However, given history of previously treated LTBI (see below) this is much less likely.  Will await total 3 sputum collection for AFB smear/culture.  Agree with current fungal workup and BAL studies.    3.  History of latent tuberculosis  + Quant 7/19/2018. Was initially started on [id number]/kg PO x4 months on 8/21/2018 (end date ~12/20/2024) but only completed 2.5 months worth. On ID clinic visit with Dr Clancy on 2/5/2019,  was transitioned over to INH 300mg daily (due to rifampin DDI with transplant immunosuppressants) which he took for an additional ~2 months with end date on ~4/9/2019.    Infectious Disease issues include:  - QTc interval: 455 on 2/28/2024  - Bacterial prophylaxis: Ertapenem 1 g daily IV  - Pneumocystis prophylaxis: Not applicable  - Serostatus & viral prophylaxis: CMV D-/R+; EBV D+/R+   - Fungal prophylaxis: Not applicable  - Isolation status: Good hand hygiene.  - Code status: Full Code    Discussed with ID attending, Dr. Liborio MD and primary team    Lindy Sherman DO, PGY 4  Infectious Disease Fellow  [facility]  Pager: [phone]          History of Infectious Disease Illness:   Mr Limon is a 76-year-old Kyrgyz-speaking male who required an in person  to help with translation.  He has a medical history significant for prostate cancer s/p prostatectomy 2020, latent tuberculosis treated in 2019, alcoholic cirrhosis c/b HCC resulting in DDLT 12/22/2018 (CMV D-/R+; EBV D+/R+) on mycophenolate 500 mg twice daily and prednisone 5 mg daily.  He was admitted to [facility] on 2/28/2024 for dysuria, bilateral flank pain, suprapubic pain, fevers, chills, green sputum cough (baseline cough with white sputum) that would occasionally have specks of bright red blood specks after severe coughing fit and dyspnea for the past 2 weeks.  He has been in [facility] [place] late January 2024 due to the passing of his uncle.  While in [place], he was living with his family wall appeared in good health.  Not around any pets, livestock or birds.  No hiking/camping excursions.  He did get seen by a medical professional in [place] who prescribed him what appears to be levofloxacin.     Of note, he reports similar  symptoms approximately a year ago on 3/11/2023 when he was seen at [facility] ER with urine culture with pansensitive E. coli prompting treatment with cefazolin 500mg QID PO x 7 days.      Transplants:  12/22/2018  (Liver), Postoperative day:  1900.  Coordinator Jeny Szymanski    Anti-infectives  Current  Ertapenem 1 g daily IV (3/1/2024-    Prior  Ceftriaxone 1 g daily IV (2024 - 2024)  Azithromycin 500 mg daily PO (2024-3/1/2024)    Review of Systems:  CONSTITUTIONAL: Positive fevers/chills/decreased appetite  EYES: negative for icterus or acute vision changes  ENT:  negative for acute hearing loss, tinnitus, sore throat  RESPIRATORY: Positive for cough, green sputum and dyspnea  CARDIOVASCULAR:  negative for chest pain, palpitations  GASTROINTESTINAL:  negative for nausea, vomiting, diarrhea or constipation  GENITOURINARY: Positive for dysuria.  Negative for hematuria  HEME:  No easy bruising or bleeding  INTEGUMENT:  negative for rash or pruritus  NEURO:  Negative for headache or tremor    Past Medical History:   Diagnosis Date    Anemia     Biliary stricture of transplanted liver (H) 2018    BPH (benign prostatic hyperplasia)     Cancer (H)     hepatocellualr carcinoma    Cirrhosis of liver (H) 2018    Diabetes (H)     Enlarged prostate     Hypothyroidism     Impotence of organic origin 2020    Inguinal hernia     Repaired with mesh on 18    Liver lesion 2018    Liver transplanted (H) 2018     donor liver transplant    Portal vein thrombosis     on path explant    Postoperative atrial fibrillation (H) 2018    Prostate cancer (H)     TB lung, latent     Treated        Past Surgical History:   Procedure Laterality Date    APPENDECTOMY      COLONOSCOPY          CV CORONARY ANGIOGRAM N/A 10/13/2021    Procedure: CV CORONARY ANGIOGRAM;  Surgeon: Yaya Hampton MD;  Location:  HEART CARDIAC CATH LAB    CV CORONARY LITHOTRIPSY PCI N/A 10/13/2021    Procedure: CV Coronary Lithotripsy PCI;  Surgeon: Yaya Hampton MD;  [facility]    CV INSTANTANEOUS WAVE-FREE RATIO N/A 10/13/2021    Procedure: Instantaneous  "Wave-Free Ratio;  Surgeon: Yaya Hampton MD;  Location:  HEART CARDIAC CATH LAB    CV INTRAVASULAR ULTRASOUND N/A 10/13/2021    Procedure: Intravascular Ultrasound;  Surgeon: Yaya Hampton MD;  [facility]    CV PCI STENT DRUG ELUTING N/A 10/13/2021    Procedure: Percutaneous Coronary Intervention Stent Drug Eluting;  Surgeon: Yaya Hampton MD;  Location:  HEART CARDIAC CATH LAB    DAVINCI PROSTATECTOMY N/A 1/3/2020    Procedure: Robot-assisted laparoscopic radical prostatectomy, Bladder biopsy;  Surgeon: Kenrick Casiano MD;  Location: [facility]    ENDOSCOPIC RETROGRADE CHOLANGIOPANCREATOGRAM N/A 2018    Procedure: ENDOSCOPIC RETROGRADE CHOLANGIOPANCREATOGRAM, with Billary Sphincterotomy and Billary Stent Placement;  Surgeon: Omero Lawler MD;  [facility]    ENDOSCOPIC RETROGRADE CHOLANGIOPANCREATOGRAM  2019    Procedure: COMBINED ENDOSCOPIC RETROGRADE CHOLANGIOPANCREATOGRAPHY, Bile Duct Stent Exchange;  Surgeon: Omero Lawler MD;  [facility]    ENDOSCOPIC RETROGRADE CHOLANGIOPANCREATOGRAM N/A 2019    Procedure: ENDOSCOPIC RETROGRADE CHOLANGIOPANCREATOGRAPHY, WITH BILIARY STENT REMOVAL AND STONE EXTRACTION;  Surgeon: Omero Lawler MD;  [facility]    HERNIORRHAPHY INGUINAL  2018    Procedure: Herniorrhaphy inguinal;  Surgeon: Emil Echevarria MD;  [facility]    IR LIVER BIOPSY PERCUTANEOUS  2018    LAPAROTOMY EXPLORATORY      per the patient \"for infection in the LLQ\"     TRANSPLANT LIVER RECIPIENT  DONOR N/A 2018    Procedure: TRANSPLANT LIVER RECIPIENT  DONOR;  Surgeon: Emil Echevarria MD;  [facility]       Family History   Problem Relation Age of Onset    Memory loss Mother     Liver Disease Brother     Skin Cancer No family hx of     Melanoma No family hx of        Social History     Social History Narrative    Born " in [place], came [place].     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[employer], trimming meats     Social History     Tobacco Use    Smoking status: Former     Packs/day: 0.03     Years: 20.00     Additional pack years: 0.00     Total pack years: 0.60     Types: Cigarettes, Cigars     Start date: 1970     Quit date: 3/14/2018     Years since quittin.9    Smokeless tobacco: Never    Tobacco comments:     Quit smoking 2018, one cigarette after dinner   Vaping Use    Vaping Use: Never used   Substance Use Topics    Alcohol use: No     Comment: Quit drinking 2018    Drug use: No       Immunization History   Administered Date(s) Administered    COVID-19 Bivalent 12+ (Pfizer) 10/26/2022, 2023    COVID-19 Monovalent 18+ (Moderna) 2021, 2021, 2021    COVID-19 Monovalent Booster 18+ (Moderna) 2022    DTaP, Unspecified 2018    Influenza (High Dose) 3 valent vaccine 10/24/2018, 10/16/2019    Influenza (IIV3) PF 11/15/2000    Influenza Vaccine 65+ (Fluzone HD) 2020, 10/21/2021, 10/26/2022    Pneumo Conj 13-V (2010&after) 2018, 2020    Pneumococcal 23 valent 2018    TD,PF 7+ (Tenivac) 11/15/2000    TDAP (Adacel,Boostrix) 2018    TDAP Vaccine (Boostrix) 2018    Zoster recombinant adjuvanted (SHINGRIX) 2018, 10/24/2018       Patient Active Problem List   Diagnosis    Cirrhosis of liver (H)    Liver lesion    Liver transplant recipient (H)    Immunosuppressed status (H24)    Hypervolemia    Atrial fibrillation with rapid ventricular response (H)    Hematuria    Bilateral wheezing    Hypoxia    Hyperglycemia    Pre-diabetes    Essential hypertension    Constipation    Biliary stricture of transplanted liver (H)    Drug-induced diabetes mellitus (H24)    Muscle tension dysphonia    Laennec's cirrhosis (H)    Elevated ferritin    Inguinal hernia of right side with obstruction and without gangrene    Right sided abdominal pain    Tobacco  use disorder    Prostate cancer (H)    Diabetes mellitus, type 2 (H)    Persistent proteinuria    Chronic kidney disease, stage 2 (mild)    Hypothyroidism, unspecified type    Status post coronary angiogram    Polyneuropathy associated with underlying disease (H24)    Uncontrolled type 2 diabetes mellitus with hyperglycemia (H)    Coronary artery disease of native artery of native heart with stable angina pectoris (H24)    Acute UTI    DEREK (acute kidney injury) (H24)    Fever in adult    Community acquired pneumonia of left lower lobe of lung            Current Medications & Allergies:      [Held by provider] amLODIPine  5 mg Oral Daily    [Held by provider] apixaban ANTICOAGULANT  2.5 mg Oral BID    atorvastatin  40 mg Oral Daily    [Held by provider] empagliflozin  10 mg Oral Daily    ertapenem  1 g Intravenous Q24H    [Held by provider] hydrALAZINE  25 mg Oral BID    insulin aspart  1-7 Units Subcutaneous TID AC    insulin aspart  1-5 Units Subcutaneous At Bedtime    insulin glargine  25 Units Subcutaneous At Bedtime    levothyroxine  150 mcg Oral Daily    metFORMIN  1,000 mg Oral BID w/meals    metoprolol tartrate  12.5 mg Oral BID    mycophenolate  500 mg Oral BID IS    polyethylene glycol  17 g Oral Daily    predniSONE  5 mg Oral Daily    sodium chloride (PF)  3 mL Intracatheter Q8H    ursodiol  250 mg Oral BID    Vitamin D3  50 mcg Oral Daily       Infusions/Drips:      No Known Allergies         Physical Exam:   Patient Vitals for the past 24 hrs:   BP Temp Temp src Pulse Resp SpO2   03/05/24 1300 138/66 98.5  F (36.9  C) Oral 91 20 94 %   03/05/24 1206 -- -- -- -- -- 96 %   03/05/24 1204 127/75 98.4  F (36.9  C) Oral 63 16 96 %   03/05/24 1146 -- -- -- -- -- 97 %   03/05/24 1141 136/76 -- -- 85 16 98 %   03/05/24 1136 (!) 143/100 -- -- 90 15 98 %   03/05/24 1131 (!) 137/95 -- -- 77 15 (!) 89 %   03/05/24 1126 (!) 151/83 -- -- 77 23 95 %   03/05/24 1121 (!) 147/80 -- -- 80 18 95 %   03/05/24 0817 135/73 --  "-- 84 -- --   03/05/24 0500 (!) 151/72 97.5  F (36.4  C) Oral 85 16 94 %   03/04/24 2111 (!) 156/80 97.9  F (36.6  C) Oral 69 16 95 %     Ranges for vital signs:  Temp:  [97.5  F (36.4  C)-98.5  F (36.9  C)] 98.5  F (36.9  C)  Pulse:  [63-91] 91  Resp:  [15-23] 20  BP: (127-156)/() 138/66  SpO2:  [89 %-98 %] 94 %  Vitals:    02/28/24 1957   Weight: 86.6 kg (191 lb)       Physical Examination:  GENERAL:  well-developed, well-nourished, in bed in no acute distress.  HEAD:  Head is normocephalic, atraumatic   EYES:  Eyes have anicteric sclerae without conjunctival injection   ENT:  Oropharynx is moist without exudates or ulcers. Tongue is midline  NECK:  Supple. No cervical lymphadenopathy  LUNGS: End expiratory wheezing in bilateral lower lobes with diminished breath sounds throughout.  CARDIOVASCULAR: Irregularly irregular rhythm but regular rate. No murmur  ABDOMEN:  Normal bowel sounds, soft, nontender.   SKIN:  No acute rashes. Line in place without any surrounding erythema or exudate.  NEUROLOGIC:  Grossly nonfocal. Active x4 extremities         Laboratory Data:     No results found for: \"ACD4\", \"HIVPCR\"    Inflammatory Markers    Recent Labs   Lab Test 09/08/23  1253   PSA <0.01       Immune Globulin Studies     Recent Labs   Lab Test 03/24/21  0812   IGG 1,064   IGM 39          Metabolic Studies       Recent Labs   Lab Test 03/05/24  1442 03/05/24  0802 03/05/24  0554 03/04/24  0821 03/04/24  0626 03/02/24  0935 03/02/24  0751 02/28/24  2324 02/28/24  2048 09/08/23  2201 09/08/23  1253 07/19/23  1749 07/19/23  1746 12/24/18  0524 12/23/18  1204   NA  --   --  137  --  138   < > 135   < > 131*   < > 142   < > 139   < >  --    POTASSIUM  --   --  3.9  --  3.8   < > 4.1   < > 4.0   < > 6.0*   < > 5.6*   < >  --    CHLORIDE  --   --  108*  --  107   < > 104   < > 98   < > 107  --  107   < >  --    CO2  --   --  20*  --  22   < > 21*   < > 20*   < > 25  --  22   < >  --    ANIONGAP  --   --  9  --  9   " < > 10   < > 13   < > 10  --  10   < >  --    BUN  --   --  12.8  --  12.8   < > 15.6   < > 25.0*   < > 23.9*  --  22.3   < >  --    CR  --   --  0.75  --  0.80   < > 0.92   < > 1.26*   < > 0.98  --  1.00   < >  --    GFRESTIMATED  --   --  >90  --  >90   < > 89   < > 61   < > 83  --  81   < >  --    *   < > 105*   < > 122*   < > 178*   < > 180*   < > 217*   < > 147*   < >  --    A1C  --   --   --   --   --   --   --   --   --   --   --   --  8.8*   < >  --    YELENA  --   --  8.1*  --  7.9*   < > 8.4*   < > 8.7*   < > 9.3  --  9.1   < >  --    PHOS  --   --   --   --   --   --   --   --   --   --  3.4  --   --    < >  --    MAG  --   --   --   --   --   --   --   --   --   --   --   --  2.1   < >  --    LACT  --   --   --   --   --   --   --   --  1.6  --   --   --   --   --  0.8   PCAL  --   --   --   --   --   --  1.62*   < >  --   --   --   --   --   --   --     < > = values in this interval not displayed.       Hepatic Studies    Recent Labs   Lab Test 03/05/24  0554 03/04/24  0626 07/19/23  1411 06/14/23  1556 02/04/19  1024 01/31/19  0815   BILITOTAL 0.3 0.3   < > 0.5   < > 0.6   19774  --   --   --   --   --  0.6   DBIL <0.20 <0.20   < > <0.20   < > 0.2   ALKPHOS 297* 308*   < > 192*   < > 348*   48951  --   --   --   --   --  348*   PROTTOTAL 5.5* 5.5*   < > 6.6   < > 7.1   24794  --   --   --   --   --  7.1   ALBUMIN 2.4* 2.4*   < > 3.8   < > 3.6   59284  --   --   --   --   --  3.6   AST 28 31   < > 22   < > 15   03200  --   --   --   --   --  15   ALT 33 30   < > 31   < > 22   90309  --   --   --   --   --  22   LDH  --  179  --   --   --   --    EVELYN  --   --   --  32  --   --     < > = values in this interval not displayed.       Pancreatitis testing    Recent Labs   Lab Test 07/19/23  1411 07/10/19  0909 03/04/19  1218 02/18/19  0742 12/23/18  0404 12/22/18  0013 10/24/18  1118   AMYLASE  --   --  44 38 46 67  --    LIPASE  --   --  42* 35* 93  --  55*   TRIG 170*   < >  --   --   --   --   --      < > = values in this interval not displayed.       Gout Labs    No lab results found.    Hematology Studies   Recent Labs   Lab Test 03/05/24  0554 03/04/24  0626 03/03/24  0539 03/02/24  0751 07/21/21  1804 07/07/21  1059 03/12/19  0815 03/04/19  1218 02/04/19  1024 01/31/19  0815   WBC 7.7 8.1 7.5 8.3   < > 7.0   < > 3.6*   < > 4.1   46672  --   --   --   --   --   --   --   --   --  4.1   ANEU  --   --   --   --   --  4.6  --  2.3   < >  --    ANEUTAUTO 5.3 5.8 5.3 6.1   < >  --   --   --   --   --    ALYM  --   --   --   --   --  1.8  --  1.0   < >  --    ALYMPAUTO 1.5 1.5 1.4 1.4   < >  --   --   --   --   --    KM  --   --   --   --   --  0.4  --  0.2   < >  --    AMONOAUTO 0.6 0.5 0.5 0.5   < >  --   --   --   --   --    AEOS  --   --   --   --   --  0.2  --  0.1   < >  --    AEOSAUTO 0.3 0.3 0.2 0.1   < >  --   --   --   --   --    ABSBASO 0.0 0.0 0.0 0.0   < >  --   --   --   --   --    HGB 12.0* 12.1* 11.5* 12.9*   < > 14.0   < > 11.2*   < > 10.2*   74794  --   --   --   --   --   --   --   --   --  10.2*   HCT 37.6* 36.8* 35.9* 39.3*   < > 40.1   < > 34.4*   < > 30.0*    405 365 330   < > 208   < > 199   < > 234   41363  --   --   --   --   --   --   --   --   --  234    < > = values in this interval not displayed.       Clotting Studies    Recent Labs   Lab Test 02/29/24  1004 03/11/23  0434 04/23/21  0755 12/31/18  0715   INR 1.08 0.92 1.02 1.14   PTT 43*  --   --   --        Iron Testing    Recent Labs   Lab Test 03/05/24  0554 07/19/23  1411 06/14/23  1556 08/21/18  1050 07/19/18  1132   IRON  --   --   --   --  141   FEB  --   --   --   --  208*   IRONSAT  --   --   --   --  68*   MCV 87   < > 86   < > 99   B12  --   --  270   < >  --     < > = values in this interval not displayed.       Markers    Recent Labs   Lab Test 03/07/22  1331 09/03/21  1757 02/01/21  0723 09/25/20  0820 07/03/20  1109 03/04/19  1218 10/03/18  0923 09/18/18  1524 08/21/18  1050 07/19/18  1132 06/18/18  0747  "05/29/18  0754   FETO <1.5 <1.5 1.7 <1.5 <1.5 2.5 5.9 4.4 4.5 4.9 3.8 4.0       Autoimmune Testing  No lab results found.    Invalid input(s): \"PRO3\", \"C3\", \"C4\", \"VASPAN\"    Arterial Blood Gas Testing    Recent Labs   Lab Test 12/23/18  0404 12/22/18  2126 12/22/18 2010 12/22/18  1914 12/22/18  1700   PH 7.41 7.43 7.41 7.42 7.36   PCO2 41 41 42 39 41   PO2 71* 92 221* 247* 184*   HCO3 26 27 26 25 23   O2PER 40 40 75 75 75        Thyroid Studies     Recent Labs   Lab Test 03/02/24  0751 09/08/23  1253 06/14/23  1556 01/27/23  1341 10/04/22  1101   TSH 5.47* 2.17 4.38* 9.80* 5.43*   T4 1.24  --  1.18 1.03 1.54       Urine Studies     Recent Labs   Lab Test 02/28/24  2035 09/08/23  2306 07/19/23  1427 05/09/23  0843 03/20/23  1041   URINEPH 5.5 5.5 6.0 5.5 6.0   NITRITE Negative Negative Negative Negative Negative   LEUKEST Small* Negative Negative Negative Negative   WBCU 49* 1 1 <1 1       Medication levels    Recent Labs   Lab Test 03/23/22  0720 12/27/18  0512 12/26/18  1046   VANCOMYCIN  --   --  15.1   TACROL <1.0*   < >  --     < > = values in this interval not displayed.       CSF testing   No lab results found.    Invalid input(s): \"CADAM\", \"EVPCR\", \"ENTPCR\", \"ENTEROVIRUS\"    Body fluid stats    Recent Labs   Lab Test 03/05/24  1131 12/25/18  0719   FCOL Colorless  --    FAPR Clear  --    FWBC 125  --    FNEU 6  --    FLYM 7  --    FMONO 87  --    GS  --  >10 Squamous epithelial cells/low power field indicates oral contamination. Please   recollect.  *  <25 PMNs/low power field  Moderate  Mixed gram positive luc         Microbiology:  Fungal testing  No lab results found.    Invalid input(s): \"HIFUN\", \"FUNGL\"    Last Culture results   Legionella pneumophila serogroup 1 urinary antigen   Date Value Ref Range Status   02/29/2024 Negative Negative Final     Comment:     Suggests no recent or current infection. Infection due to Legionella cannot be ruled out, since other serogroups and species may cause " disease, antigen may not be present in urine in early infection, and the level of antigen present in the urine may be below detectable limits of the test.     Streptococcus pneumoniae antigen   Date Value Ref Range Status   02/29/2024 Negative Negative Final     Comment:     A negative Streptococcus pneumoniae antigen result does not rule out infection with Streptococcus pneumoniae.     Culture   Date Value Ref Range Status   02/29/2024 3+ Normal luc  Final   02/28/2024 No Growth  Final   02/28/2024 No Growth  Final   02/28/2024 50,000-100,000 CFU/mL Escherichia coli ESBL (A)  Final   03/11/2023 >100,000 CFU/mL Escherichia coli (A)  Final     Culture Micro   Date Value Ref Range Status   02/18/2020   Final    <10,000 colonies/mL  urogenital luc  Susceptibility testing not routinely done     12/16/2019 <10,000 colonies/mL  urogenital luc    Final   12/16/2019 Susceptibility testing not routinely done  Final   10/10/2019 No growth  Final   12/25/2018 (A)  Final    Canceled, Test credited  >10 Squamous epithelial cells/low power field indicates oral contamination. Please   recollect.     12/25/2018   Final    Notification of test cancellation was given to  Tahira Reis RN at 1000 12.25.18     12/24/2018 No growth  Final   12/24/2018 No growth  Final   12/22/2018 No anaerobes isolated  Final   12/22/2018 No growth  Final   12/22/2018 Culture negative after 30 days  Final         Last checks of Clostridioides difficile testing  No lab results found.    Syphilis Testing    Treponema Antibodies   Date Value Ref Range Status   07/19/2018 Nonreactive NR^Nonreactive Final     Treponema Antibody Total   Date Value Ref Range Status   06/14/2023 Nonreactive Nonreactive Final       Quantiferon testing   Recent Labs   Lab Test 03/05/24  0554 03/04/24  0626 09/18/18  1528 07/19/18  1132   TBRES  --   --   --  Positive*   LYMPH 19 18   < >  --     < > = values in this interval not displayed.       Infection Studies to assess  "Diarrhea  No lab results found.    Invalid input(s): \"BIDYD\"    Virology:  Coronavirus-19 testing    Recent Labs   Lab Test 02/28/24  2039 10/11/21  0806 10/01/21  1141   [id number] Negative Negative Negative       Respiratory virus (non-coronavirus-19) testing    Recent Labs   Lab Test 02/29/24  1000 02/28/24 2039   IFLUA Not Detected  --    INFZA  --  Negative   FLUAH1 Not Detected  --    TF7492 Not Detected  --    FLUAH3 Not Detected  --    IFLUB Not Detected  --    INFZB  --  Negative   PIV1 Not Detected  --    PIV2 Not Detected  --    PIV3 Not Detected  --    PIV4 Not Detected  --    IRSV  --  Negative   RSVA Not Detected  --    RSVB Not Detected  --    HMPV Not Detected  --    ADENOV Not Detected  --    CORONA Not Detected  --        CMV viral loads  No lab results found.    CMV resistance testing  No lab results found.    No results found for: \"H6RES\"    No results found for: \"EBVRESINST\", \"54423\", \"EBQTC\", \"EBRES\", \"33371\", \"04470\"    No results found for: \"08055\", \"BKRES\"    Parvovirus Testing  No lab results found.    Invalid input(s): \"PRVRES\"    Adenovirus Testing  No lab results found.    Invalid input(s): \"ADENAB\", \"ADENOVIRUS\", \"ADQT\"    Hepatitis B Testing     Recent Labs   Lab Test 12/22/18  0013   AUSAB 67.96*   HBCAB Reactive*        Hepatitis C Antibody   Date Value Ref Range Status   12/22/2018 Nonreactive NR^Nonreactive Final     Comment:     Assay performance characteristics have not been established for newborns,   infants, and children         CMV Antibody IgG   Date Value Ref Range Status   12/22/2018 >8.0 (H) 0.0 - 0.8 AI Final     Comment:     Positive  Antibody index (AI) values reflect qualitative changes in antibody   concentration that cannot be directly associated with clinical condition or   disease state.     07/19/2018 >8.0 (H) 0.0 - 0.8 AI Final     Comment:     Positive  Antibody index (AI) values reflect qualitative changes in antibody   concentration that cannot be directly " associated with clinical condition or   disease state.       CMV Antibody IgM   Date Value Ref Range Status   12/22/2018 <0.2 0.0 - 0.8 AI Final     Comment:     Negative  Antibody index (AI) values reflect qualitative changes in antibody   concentration that cannot be directly associated with clinical condition or   disease state.       EBV Capsid Antibody IgG   Date Value Ref Range Status   12/22/2018 >8.0 (H) 0.0 - 0.8 AI Final     Comment:     Positive, suggests recent or past exposure  Antibody index (AI) values reflect qualitative changes in antibody   concentration that cannot be directly associated with clinical condition or   disease state.     07/19/2018 7.7 (H) 0.0 - 0.8 AI Final     Comment:     Positive, suggests recent or past exposure  Antibody index (AI) values reflect qualitative changes in antibody   concentration that cannot be directly associated with clinical condition or   disease state.       Toxoplasma Antibody IgG   Date Value Ref Range Status   09/18/2018 <3.0 0.0 - 7.1 IU/mL Final     Comment:     Negative- Absence of detectable Toxoplasma gondii IgG antibodies. A negative   result does not rule out acute infection.  The magnitude of the measured result is not indicative of the amount of   antibody present. The concentrations of anti-Toxoplasma gondii IgG in a given   specimen determined with assays from different manufacturers can vary due to   differences in assay methods and reagent specificity.       EBV Capsid Antibody IgM   Date Value Ref Range Status   12/22/2018 <0.2 0.0 - 0.8 AI Final     Comment:     No detectable antibody.  Antibody index (AI) values reflect qualitative changes in antibody   concentration that cannot be directly associated with clinical condition or   disease state.         Imaging:  No results found for this or any previous visit (from the past 48 hour(s)).

## 2024-03-05 NOTE — PROGRESS NOTES
PULMONARY CONSULTATION: Progress Note     Patient:  Loy Limon   Date of birth 1955, Medical record number [id number]  Date of Visit:  2024  Date of Admission: 2024  Consult Requester: Jessica Chacon MD  Reason for Consult: New onset hemoptysis           Assessment and Recommendations:     ASSESSMENT:  Mr. Ashley Limon is a 69 y/o male with PMHx notable for alcohol-related cirrhosis c/b HCC s/p DDLT (), prostate cancer s/p RALP (2020), CKD, hypothyroidism, atrial fibrillation, hypertension, H/o latent TB- treated, & steroid/MMF immunosuppression presenting with community acquired pneumonia and bilateral ESBL E. Coli pyelonephritis since about 2 weeks. He has been having blood tinged sputum since 2 weeks, however no aydin bleeding. He was recently started on DOACs, however has been having this blood tinged sputum even prior to starting it, hence seemingly unrelated. Complains of significant weight loss as well. No significant contact history except attending a  in [place] recently.    PROBLEM LIST AND DISCUSSION:   # Hemoptysis  # Community acquired pneumonia  # Bilateral GGOs on CXR and CT- likely infection  # H/o latent TB- treated (Quantiferon TB positive in 2018)  # Chronic immunosuppression with MMF, steroids  This patient has been having fever with cough and dyspnea since 2 weeks, now much better. Cough is associated with minimally blood tinged sputum, however no aydin bleeding.   CXR (, 3/2) suggestive of left lower lobe opacity, possibly infectious. CT chest shows bilateral perihilar predominant GGOs, likely to be infectious given the patient's history. In fact, CT chest 3/3 shows interval worsening of GGOs as compared to that on . No obvious signs of pulmonary TB on CXR or TB.   Respiratory panel testing was negative for viruses, chlamydia, mycoplasma, pneumococus, legionella, and Staph aureus. Sputum cultures growing only normal luc. Blood cultures- NGTD.  Procal elevated, but currently trending down.   In the setting of immunosuppressed state 2/2 Mycophenolate and steroids, our differentials at this point include:  -PJP  -Other pulmonary mycoses (especially Coccidioidomycosis, given recent travel to [place])   -TB reactivation (less likely since latent TB was treated prior to transplant, and imaging suggests otherwise)     Would recommend strict airborne isolation precautions given the possibility of TB. TB and fungal work-up recommended, pending results.     # Bilateral ESBL E. Coli pyelonephritis  # Afib with RVR iso acute infection  # Elevated troponin  # Alcohol-related cirrhosis c/b HCC s/p DDLT (2018)  #  Prostate cancer s/p RALP (1/2020)  # CKD  # HTN  # Hypothyroidism  # CAD s/p PCI to mLAD       RECOMMENDATION:  Airborne isolation precautions  Sputum AFB x3 pending  Fungal studies pending results  BAL studies pending results    Thank you for this consult. Pulmonary consults team will continue to follow.     Patient was discussed with Dr. Jimmy Farley.     Ezekiel FERNANDO  Medical Student           Interval History:   Patient is doing fine, except for minimal chest pain on coughing. No fevers, chills, cough etc.         Review of Systems:   CONSTITUTIONAL:  No fevers or chills currently, previously present  EYES: negative for icterus  ENT:  negative for hearing loss, tinnitus and sore throat  RESPIRATORY:  Cough with sputum and dyspnea, blood tinged sputum  CARDIOVASCULAR:  some chest pain on coughing, not cardiac type  GASTROINTESTINAL:  negative for nausea, vomiting, diarrhea and constipation  GENITOURINARY:  negative for dysuria, previously present  HEME:  No easy bruising  INTEGUMENT:  negative for rash and pruritus  NEURO:  Negative for headache         Current Medications:      [Held by provider] amLODIPine  5 mg Oral Daily    [Held by provider] apixaban ANTICOAGULANT  2.5 mg Oral BID    atorvastatin  40 mg Oral Daily    [Held by provider] empagliflozin  10  mg Oral Daily    ertapenem  1 g Intravenous Q24H    [Held by provider] hydrALAZINE  25 mg Oral BID    insulin aspart  1-7 Units Subcutaneous TID AC    insulin aspart  1-5 Units Subcutaneous At Bedtime    insulin glargine  25 Units Subcutaneous At Bedtime    levothyroxine  150 mcg Oral Daily    [Held by provider] metFORMIN  1,000 mg Oral BID w/meals    metoprolol tartrate  12.5 mg Oral BID    mycophenolate  500 mg Oral BID IS    polyethylene glycol  17 g Oral Daily    [Held by provider] predniSONE  5 mg Oral Daily    sodium chloride (PF)  3 mL Intracatheter Q8H    ursodiol  250 mg Oral BID    Vitamin D3  50 mcg Oral Daily            Allergies:   No Known Allergies         Physical Exam:   Vitals were reviewed  Patient Vitals for the past 24 hrs:   BP Temp Temp src Pulse Resp SpO2   03/05/24 0817 135/73 -- -- 84 -- --   03/05/24 0500 (!) 151/72 97.5  F (36.4  C) Oral 85 16 94 %   03/04/24 2111 (!) 156/80 97.9  F (36.6  C) Oral 69 16 95 %   03/04/24 1300 131/75 98.4  F (36.9  C) Oral 70 16 93 %   03/04/24 1040 121/62 97.7  F (36.5  C) -- 66 -- --       Physical Examination:  GENERAL:  well-developed, well-nourished, in bed in no acute distress.   HEENT:  Head is normocephalic, atraumatic   EYES:  Eyes have anicteric sclerae without conjunctival injection or stigmata of endocarditis.    ENT:  Oropharynx is moist without exudates or ulcers. Tongue is midline  NECK:  Supple. No cervical lymphadenopathy  LUNGS:  Bilateral basal and posterior middle zone crackles present   CARDIOVASCULAR:  Regular rate and rhythm with no murmurs, gallops or rubs.  ABDOMEN:  Normal bowel sounds, soft, nontender. No appreciable hepatosplenomegaly  SKIN:  No acute rashes.  Line(s) are in place without any surrounding erythema or exudate. No stigmata of endocarditis.  NEUROLOGIC:  Grossly nonfocal. Active x4 extremities         Laboratory Data:     Inflammatory Markers  No lab results found.    Hematology Studies    Recent Labs   Lab Test  03/05/24  0554 03/04/24  0626 03/03/24  0539 03/02/24  0751 03/01/24  0708 02/29/24  0537 07/21/21  1804 07/07/21  1059 03/12/19  0815 03/04/19  1218 02/26/19  0754 12/26/18  0910 12/24/18  0524 12/23/18  1204 12/23/18  0404   WBC 7.7 8.1 7.5 8.3 11.1* 7.9   < > 7.0   < > 3.6* 3.7*   < > 9.3 11.3* 7.2   ANEU  --   --   --   --   --   --   --  4.6  --  2.3 2.1  --  8.4* 10.6* 6.5   AEOS  --   --   --   --   --   --   --  0.2  --  0.1 0.2  --  0.0 0.0 0.0   HGB 12.0* 12.1* 11.5* 12.9* 12.6* 12.5*   < > 14.0   < > 11.2* 10.1*   < > 8.0* 8.7* 8.7*   MCV 87 87 85 85 85 86   < > 83   < > 88 89   < > 99 98 97    405 365 330 290 221   < > 208   < > 199 170   < > 62* 53* 50*    < > = values in this interval not displayed.       Metabolic Studies     Recent Labs   Lab Test 03/05/24  0554 03/04/24  0626 03/03/24  0539 03/02/24  0751 03/01/24  0708    138 137 135 134*   POTASSIUM 3.9 3.8 3.7 4.1 3.9   CHLORIDE 108* 107 107 104 103   CO2 20* 22 21* 21* 17*   BUN 12.8 12.8 17.5 15.6 18.9   CR 0.75 0.80 0.79 0.92 1.01   GFRESTIMATED >90 >90 >90 89 80       Hepatic Studies    Recent Labs   Lab Test 03/05/24  0554 03/04/24  0626 03/03/24  0539 03/02/24  0751 03/01/24  0708 02/29/24  0655   BILITOTAL 0.3 0.3 0.3 0.5 0.4 0.3   ALKPHOS 297* 308* 330* 349* 322* 246*   ALBUMIN 2.4* 2.4* 2.5* 2.7* 2.7* 2.7*   AST 28 31 37 40 54* 44   ALT 33 30 39 35 37 29       Microbiology:  Culture Micro   Date Value Ref Range Status   02/18/2020   Final    <10,000 colonies/mL  urogenital luc  Susceptibility testing not routinely done     12/16/2019 <10,000 colonies/mL  urogenital luc    Final   12/16/2019 Susceptibility testing not routinely done  Final   10/10/2019 No growth  Final   12/25/2018 (A)  Final    Canceled, Test credited  >10 Squamous epithelial cells/low power field indicates oral contamination. Please   recollect.     12/25/2018   Final    Notification of test cancellation was given to  Tahira Reis RN at 1000 12.25.18    "  12/24/2018 No growth  Final   12/24/2018 No growth  Final   12/22/2018 No anaerobes isolated  Final   12/22/2018 No growth  Final   12/22/2018 Culture negative after 30 days  Final   12/22/2018 No VRE isolated  Final       Urine Studies    Recent Labs   Lab Test 02/28/24  2035 09/08/23  2306 07/19/23  1427 05/09/23  0843 03/20/23  1041   LEUKEST Small* Negative Negative Negative Negative   WBCU 49* 1 1 <1 1     Respiratory Virus Testing    No results found for: \"RS\", \"FLUAG\"     CXR (3/2):  Impression:   1. Left lower lobe opacity may represent infectious pneumonia.  2. Trace pleural effusions.    CXR (2/28):  Impression: Since 07/14/2021, development of a left lower lobe airspace opacity which could be infectious/inflammatory. Remainder not significantly changed. Bibasilar atelectasis/scarring. Normal heart size and pulmonary vasculature. No pleural effusion.  Upper abdominal surgical clips. Degenerative changes both shoulders.    CT abdomen (2/28):   IMPRESSION:   1.  Pneumonia involving the included bases of the right middle lobe and both lower lobes. Trace pleural effusions.  2.  Possible bilateral pyelonephritis. No abscess. Clinical correlation recommended.    CT chest (3/3):   IMPRESSION:   Bilateral perihilar predominant ground glass opacities, this is nonspecific and may represent alveolar hemorrhage, infectious or inflammatory etiology. Recommend follow-up to clearance.              I was present with the medical student who participated in the service and in the documentation of the note. I have verified the history and personally performed the physical exam and medical decision making. I agree with the assessment and plan of care as documented in the note.    Jimmy Farley MD      "

## 2024-03-05 NOTE — PROGRESS NOTES
Resident/Fellow Attestation   I, Vani Stapleton MD, was present with the medical/IRVIN student who participated in the service and in the documentation of the note.  I have verified the history and personally performed the physical exam and medical decision making.  I agree with the assessment and plan of care as documented in the note.      Vani Stapleton MD  PGY3  Date of Service (when I saw the patient): 03/05/24    [facility]    Progress Note - Medicine Service, [facility] TEAM 4       Date of Admission:  2/28/2024    Assessment & Plan   Loy is a 67 y/o male with pmhx notable for alcohol-related cirrhosis c/b HCC s/p DDLT (2018), prostate cancer s/p RALP (1/2020), CKD, hypothyroidism, atrial fibrillation, hypertension & steroid/immunosuppression induced diabetes admitted for acute pyelonephritis & community acquired pneumonia      Today:  - Bronchoscopy today (3/5/24) at 11AM  - BAL studies sent: cell count, gram stain, bacterial/fungal culture, KOH, legionella, nocardia, MTB, aspergillus, PJP, coccidiodes, histoplasmosis, CMV  - AFB sputum x 3 pending complete collection  - PJP PCR sputum pending  - Consult transplant ID for recommended discharge antibiotic choice/duration of ESBL E. Coli pyelo given immunosuppression  - Resume PTA metformin this PM    Hemoptysis  Bilateral GGO  Community acquired pneumonia, treated w/ azithromycin and ceftriaxone  Hx of latent TB- treated  Upon admission, endorsed dyspnea, productive cough of yellow sputum. CXR with consolidation noted in RML and R/LLL opacities. Completed course of azithromycin and ceftriaxone. After starting eliquis 2.5 mg BID, started having streaks of blood in sputum. Oxygen requirements have remained stable. CT chest demonstrated bilateral GGO that could be alveolar hemorrhage vs infection. Procal elevated, but down trending. Legionella, strep pneumo, coccidiodes, aspergillus, fungal  antibodies, 1,3 beta D glucan, and respiratory panel negative.  - Pulmonology consulted  - S/p Bronchoscopy with BAL on 3/5/24  - BAL studies sent: cell count, gram stain, bacterial/fungal culture, KOH, legionella, nocardia, MTB, aspergillus, PJP, coccidiodes, histoplasmosis, CMV  - Sputum AFB x 3 pending (only 1x collected 3/5/24)  - Sputum PJP PCR pending  - Airborne precautions  - Antibiotics  - Azithromycin (2/28/24 - 3/1/24)  - Ceftriaxone (2/28/24 - 2/29/24)  - Ertapenem (3/1/24 - present)  - Hold anticoagulation    Acute pyelonephritis d/t ESBL E.coli  Hx of hemorrhagic cystitis with normal cystoscopy (2021)  Endorsed dysuria, bilateral flank pain, fevers, nausea/vomiting, reduced appetite. Was recently in [place] when symptoms started ~2 weeks ago. Prescribed levofloxacin, did not complete course and felt that symptoms have not improved. CT a/p with findings c/w bilateral pyelonephritis. Hx of prior UTI in 3/2023, grew panS E. Coli. Urine culture from admission with ESBL E.coli (sensitive to bactrim).  - Consulted Transplant ID for recommendations on antibiotic choice and duration for discharge  - Continue ertapenem (3/1/24 - present); anticipate 14 day course of antibiotics pending ID recs     Atrial fibrillation  Hx of post op afib previously on metoprolol (no longer) . EKG on arrival afib w/ rvr in the setting of acute infection. TTE 2/28/24 with normal LVEF 60-65%, moderate mitral annular calcification with trace insufficiency, and trace TV insufficiency. CHADSVASC 3- Started on apixaban 2.5 mg BID but developed hemoptysis (as per above). Holding for now. Converted to sinus. Suspect AF triggered by infection. Will continue lopressor for now but decrease to 12.5 mg BID from 25 BID given HR in the 60s.  - Continue lopressor to 12.5 mg BID  - Hold eliquis as per above     Hx of cirrhosis c/b HCC s/p DDLT (2018)  Hx of biliary stricture requiring stent, last ERCP 2019  Follows with hepatology, last visit  3/2023. Endorses feeling that his abdomen is distended more than usual. No alcohol use. No tenderness.  CT a/p with no ascites.  - Hepatology consulted, appreciate recs  - Continue PTA mycophenolate 500 mg BID (PTA dose 750 mg BID)  - Continue PTA Prednisone 5 mg daily   - Continue PTA Ursodiol 250 mg BID  - Plan for outpatient hepatology f/up    Type 2 diabetes  In the setting of steroids/immunosuppression. Follows with endocrinology as an outpatient, recent A1c 9.2%.  - Cont lantus 25 U QHS  - Resume PTA metformin 1000 mg this PM   - Hold PTA jardiance 10 mg 2/2 UTI  - JOANIE  - Hypoglycemia protocol    Hyponatremia - resolved  - CTM     DEREK - resolved  Non gap acidosis   Creatinine 1.26, not far from baseline of ~0.9. Likely pre-renal. Resolved with IVF.  - Daily BMP    Elevated troponin - resolved  Demand ischemia  45-->43 in the setting of afib w/ rvr, infection. No chest pain. EKG with no acute ischemic changes. Suspect demand ischemia.  - CTM     CAD s/p PCI to mLAD  Not on ASA but should be. Started apixaban this admission but had to hold given hemoptysis. Patient does not know why he is not on aspirin. Per last cardiology note he was to continue on 81 mg daily. Hx of hematuria and now hemoptysis, but no other bleeding events otherwise.  - Consider starting ASA 81 mg daily once hemoptysis resolved  - Continue Atorvastatin 40 mg     Hypertension  - Hold PTA hydralazine BID while infection and hemoptysis workup ongoing  - Continue PTA amlodipine (restarted at 5 mg daily instead of 10 daily)     Hypothyroidism  - Continue PTA levothyroxine 150 mcg     Hx of prostate cancer s/p RALP (2020)  - Follows with Urology as outpatient        Diet: NPO per Anesthesia Guidelines for Procedure/Surgery Except for: Meds    DVT Prophylaxis: Pneumatic Compression Devices  Cronin Catheter: Not present  Fluids: None  Lines: None     Cardiac Monitoring: None  Code Status: Full Code      Clinically Significant Risk Factors            "   # Hypoalbuminemia: Lowest albumin = 2.4 g/dL at 3/5/2024  5:54 AM, will monitor as appropriate     # Hypertension: Noted on problem list        # Overweight: Estimated body mass index is 29.91 kg/m  as calculated from the following:    Height as of this encounter: 1.702 m (5' 7\").    Weight as of this encounter: 86.6 kg (191 lb).             Disposition Plan      Expected Discharge Date: 03/06/2024      Destination: home with family  Discharge Comments: Dispo: Anticipate home with family  Plan: ertapenem (3/1 - *) x10-14d; bronchoscopy (3/5); AFB sputum x3  Progress: tele; Chest CT; Weaned to RA; Pulmo consult to eval hemoptysis        The patient's care was discussed with the Attending Physician, Dr. Zaragoza .    [facility]  Medicine Service, [address]  Securely message with [email or web address] (more info)  Text page via [facility] Paging/Directory   See signed in provider for up to date coverage information  ______________________________________________________________________    Interval History   RN notes reviewed, KASSY. Reports no hemoptysis overnight. No SOB, wheezing, or chest pain. Denies any issues with urination or bowel movement. Overall feels fine with no new complaints.      Physical Exam   Vital Signs: Temp: 98.4  F (36.9  C) Temp src: Oral BP: 127/75 Pulse: 63   Resp: 16 SpO2: 96 % O2 Device: None (Room air) Oxygen Delivery: 1 LPM  Weight: 191 lbs 0 oz    Constitutional: awake, alert, cooperative, no apparent distress, and appears stated age  Eyes: Lids and lashes normal, extra ocular muscles intact, sclera clear, conjunctiva normal  ENT: Normocephalic, without obvious abnormality  Respiratory: No increased work of breathing, good air exchange, clear to auscultation bilaterally, no crackles or wheezing  Cardiovascular: Normal apical impulse, regular rate and rhythm, normal S1 and S2, and no murmur noted  GI: Normal bowel sounds, soft, non-distended, " non-tender  Musculoskeletal: There is no redness, warmth, or swelling of the joints.   Neurologic: Awake, alert, oriented to name, place and time. Cranial nerves II-XII grossly intact.  Neuropsychiatric: Normal, calm, and normal eye contact    Data     I have personally reviewed the following data over the past 24 hrs:    7.7  \   12.0 (L)   / 448     137 108 (H) 12.8 /  106 (H)   3.9 20 (L) 0.75 \     ALT: 33 AST: 28 AP: 297 (H) TBILI: 0.3   ALB: 2.4 (L) TOT PROTEIN: 5.5 (L) LIPASE: N/A       Imaging results reviewed over the past 24 hrs:   No results found for this or any previous visit (from the past 24 hour(s)).

## 2024-03-05 NOTE — PLAN OF CARE
Goal Outcome Evaluation:      Plan of Care Reviewed With: patient    Overall Patient Progress: no changeOverall Patient Progress: no change    Outcome Evaluation: Patient NPO for Bronch this morning.  Denies  pain.  Up to bathroom with standby assist.  patienty wife at bedside.    A:  patient denies pain.  Up to bathroom with standby assist.  Lung sounds crackles and  diminished in bases. Patient and spouse speak [place].  Npo for Bronchoscopy this morning.  R:  continue to monitor and treat per plan of care.

## 2024-03-05 NOTE — CONSULTS
"SPIRITUAL HEALTH SERVICES Consult Note  [facility] ([place]    Referral Source/Reason for Visit: Routine consult    Summary and Recommendations -  ~Loy's goals are restorative and he stated \"I feel that I'm going to triumphant through this.\" While his wife feels a little worried.    ~Loy would benefit from further SHS if any changes occur in his illness.    Plan: Loy is aware of how to access SHS and remains available upon request. I will follow up if Loy's stay extnds.    Rev. DALLAS Perera.  Chaplain Resident  Pager [phone]    * SHS remains available 24/7 for emergent requests/referrals, either by having the switchboard page the on-call  or by entering an ASAP/STAT consult in Epic (this will also page the on-call ). Routine Epic consults receive an initial response within 24 hours.*    Assessment      Patient/Family Understanding of Illness and Goals of Care -   ~Loy and wife are waiting for more clarification tomorrow regarding his illness and the hope of discharge.      Distress and Loss -  ~Staff shared with me some emotional distress Loy was experiencing. Loy remained in a positive mood while we talked. I did validate the importance of allowing all emotions to be recognized, the positive and the difficult ones.      Strengths, Coping, and Resources -   ~Loy gains support from his wife, friends, and God \"the veena upstairs.\"      Meaning, Beliefs, and Spirituality -   ~Loy identifies as Jain and finds prayer supportive and meaningful.    "

## 2024-03-05 NOTE — OR NURSING
Pt tolerated bronchoscopy with BAL st the Lingula, very well, under conscious sedation. Pt desat to 88% soon after initial dose of medication was given. Turned O2 up to 5 lpm, then 10 lpm. No further desaturation. Pt on 2 lpm O2 when pt was returned to the PCU. Report was called to pts floor nurse

## 2024-03-06 ENCOUNTER — APPOINTMENT (OUTPATIENT)
Dept: GENERAL RADIOLOGY | Facility: CLINIC | Age: 69
DRG: 193 | End: 2024-03-06
Payer: COMMERCIAL

## 2024-03-06 ENCOUNTER — APPOINTMENT (OUTPATIENT)
Dept: ULTRASOUND IMAGING | Facility: CLINIC | Age: 69
DRG: 193 | End: 2024-03-06
Attending: STUDENT IN AN ORGANIZED HEALTH CARE EDUCATION/TRAINING PROGRAM
Payer: COMMERCIAL

## 2024-03-06 ENCOUNTER — APPOINTMENT (OUTPATIENT)
Dept: INTERPRETER SERVICES | Facility: CLINIC | Age: 69
DRG: 193 | End: 2024-03-06
Payer: COMMERCIAL

## 2024-03-06 LAB
1,3 BETA GLUCAN SER-MCNC: <31 PG/ML
ALBUMIN SERPL BCG-MCNC: 2.6 G/DL (ref 3.5–5.2)
ALP SERPL-CCNC: 301 U/L (ref 40–150)
ALT SERPL W P-5'-P-CCNC: 27 U/L (ref 0–70)
ANION GAP SERPL CALCULATED.3IONS-SCNC: 11 MMOL/L (ref 7–15)
AST SERPL W P-5'-P-CCNC: 26 U/L (ref 0–45)
BASOPHILS # BLD AUTO: 0 10E3/UL (ref 0–0.2)
BASOPHILS NFR BLD AUTO: 0 %
BILIRUB DIRECT SERPL-MCNC: <0.2 MG/DL (ref 0–0.3)
BILIRUB SERPL-MCNC: 0.4 MG/DL
BRONCHOSCOPY: NORMAL
BUN SERPL-MCNC: 10.8 MG/DL (ref 8–23)
CALCIUM SERPL-MCNC: 8.2 MG/DL (ref 8.8–10.2)
CHLORIDE SERPL-SCNC: 106 MMOL/L (ref 98–107)
CREAT SERPL-MCNC: 0.84 MG/DL (ref 0.67–1.17)
DEPRECATED HCO3 PLAS-SCNC: 22 MMOL/L (ref 22–29)
EGFRCR SERPLBLD CKD-EPI 2021: >90 ML/MIN/1.73M2
EOSINOPHIL # BLD AUTO: 0.3 10E3/UL (ref 0–0.7)
EOSINOPHIL NFR BLD AUTO: 4 %
ERYTHROCYTE [DISTWIDTH] IN BLOOD BY AUTOMATED COUNT: 13.4 % (ref 10–15)
GLUCOSE BLDC GLUCOMTR-MCNC: 130 MG/DL (ref 70–99)
GLUCOSE BLDC GLUCOMTR-MCNC: 168 MG/DL (ref 70–99)
GLUCOSE BLDC GLUCOMTR-MCNC: 177 MG/DL (ref 70–99)
GLUCOSE BLDC GLUCOMTR-MCNC: 238 MG/DL (ref 70–99)
GLUCOSE BLDC GLUCOMTR-MCNC: 94 MG/DL (ref 70–99)
GLUCOSE SERPL-MCNC: 88 MG/DL (ref 70–99)
HCT VFR BLD AUTO: 38.2 % (ref 40–53)
HGB BLD-MCNC: 12.7 G/DL (ref 13.3–17.7)
IMM GRANULOCYTES # BLD: 0.1 10E3/UL
IMM GRANULOCYTES NFR BLD: 1 %
LYMPHOCYTES # BLD AUTO: 2.3 10E3/UL (ref 0.8–5.3)
LYMPHOCYTES NFR BLD AUTO: 26 %
MCH RBC QN AUTO: 28.5 PG (ref 26.5–33)
MCHC RBC AUTO-ENTMCNC: 33.2 G/DL (ref 31.5–36.5)
MCV RBC AUTO: 86 FL (ref 78–100)
MONOCYTES # BLD AUTO: 0.6 10E3/UL (ref 0–1.3)
MONOCYTES NFR BLD AUTO: 7 %
NEUTROPHILS # BLD AUTO: 5.5 10E3/UL (ref 1.6–8.3)
NEUTROPHILS NFR BLD AUTO: 62 %
NRBC # BLD AUTO: 0 10E3/UL
NRBC BLD AUTO-RTO: 0 /100
OBSERVATION IMP: NEGATIVE
PLATELET # BLD AUTO: 482 10E3/UL (ref 150–450)
POTASSIUM SERPL-SCNC: 3.7 MMOL/L (ref 3.4–5.3)
PROT SERPL-MCNC: 6 G/DL (ref 6.4–8.3)
RBC # BLD AUTO: 4.45 10E6/UL (ref 4.4–5.9)
SCANNED LAB RESULT: NORMAL
SODIUM SERPL-SCNC: 139 MMOL/L (ref 135–145)
WBC # BLD AUTO: 8.7 10E3/UL (ref 4–11)

## 2024-03-06 PROCEDURE — 999N000065 XR CHEST PORT 1 VIEW

## 2024-03-06 PROCEDURE — 250N000013 HC RX MED GY IP 250 OP 250 PS 637: Performed by: STUDENT IN AN ORGANIZED HEALTH CARE EDUCATION/TRAINING PROGRAM

## 2024-03-06 PROCEDURE — 120N000002 HC R&B MED SURG/OB UMMC

## 2024-03-06 PROCEDURE — 36569 INSJ PICC 5 YR+ W/O IMAGING: CPT

## 2024-03-06 PROCEDURE — 250N000012 HC RX MED GY IP 250 OP 636 PS 637: Performed by: STUDENT IN AN ORGANIZED HEALTH CARE EDUCATION/TRAINING PROGRAM

## 2024-03-06 PROCEDURE — 71045 X-RAY EXAM CHEST 1 VIEW: CPT | Mod: 26 | Performed by: RADIOLOGY

## 2024-03-06 PROCEDURE — 76770 US EXAM ABDO BACK WALL COMP: CPT

## 2024-03-06 PROCEDURE — 87206 SMEAR FLUORESCENT/ACID STAI: CPT

## 2024-03-06 PROCEDURE — 250N000013 HC RX MED GY IP 250 OP 250 PS 637

## 2024-03-06 PROCEDURE — 80053 COMPREHEN METABOLIC PANEL: CPT

## 2024-03-06 PROCEDURE — 250N000009 HC RX 250

## 2024-03-06 PROCEDURE — 36415 COLL VENOUS BLD VENIPUNCTURE: CPT

## 2024-03-06 PROCEDURE — 250N000011 HC RX IP 250 OP 636: Mod: JZ

## 2024-03-06 PROCEDURE — 99233 SBSQ HOSP IP/OBS HIGH 50: CPT | Mod: GC | Performed by: INTERNAL MEDICINE

## 2024-03-06 PROCEDURE — 99233 SBSQ HOSP IP/OBS HIGH 50: CPT | Mod: GC | Performed by: STUDENT IN AN ORGANIZED HEALTH CARE EDUCATION/TRAINING PROGRAM

## 2024-03-06 PROCEDURE — 272N000450 HC KIT 4FR POWER PICC SINGLE LUMEN

## 2024-03-06 PROCEDURE — 76770 US EXAM ABDO BACK WALL COMP: CPT | Mod: 26 | Performed by: RADIOLOGY

## 2024-03-06 PROCEDURE — 250N000011 HC RX IP 250 OP 636

## 2024-03-06 PROCEDURE — 82248 BILIRUBIN DIRECT: CPT

## 2024-03-06 PROCEDURE — 85025 COMPLETE CBC W/AUTO DIFF WBC: CPT

## 2024-03-06 RX ORDER — AMLODIPINE BESYLATE 5 MG/1
5 TABLET ORAL DAILY
Status: DISCONTINUED | OUTPATIENT
Start: 2024-03-06 | End: 2024-03-07

## 2024-03-06 RX ORDER — LIDOCAINE 40 MG/G
CREAM TOPICAL
Status: DISCONTINUED | OUTPATIENT
Start: 2024-03-06 | End: 2024-03-09 | Stop reason: HOSPADM

## 2024-03-06 RX ORDER — HEPARIN SODIUM,PORCINE 10 UNIT/ML
5-20 VIAL (ML) INTRAVENOUS
Status: CANCELLED | OUTPATIENT
Start: 2024-03-06

## 2024-03-06 RX ORDER — HEPARIN SODIUM,PORCINE 10 UNIT/ML
5-20 VIAL (ML) INTRAVENOUS EVERY 24 HOURS
Status: DISCONTINUED | OUTPATIENT
Start: 2024-03-06 | End: 2024-03-09 | Stop reason: HOSPADM

## 2024-03-06 RX ORDER — METHOCARBAMOL 500 MG/1
500 TABLET, FILM COATED ORAL 4 TIMES DAILY
Status: DISCONTINUED | OUTPATIENT
Start: 2024-03-06 | End: 2024-03-09 | Stop reason: HOSPADM

## 2024-03-06 RX ORDER — HEPARIN SODIUM,PORCINE 10 UNIT/ML
5-20 VIAL (ML) INTRAVENOUS
Status: DISCONTINUED | OUTPATIENT
Start: 2024-03-06 | End: 2024-03-09 | Stop reason: HOSPADM

## 2024-03-06 RX ORDER — HEPARIN SODIUM,PORCINE 10 UNIT/ML
5-20 VIAL (ML) INTRAVENOUS EVERY 24 HOURS
Status: CANCELLED | OUTPATIENT
Start: 2024-03-06

## 2024-03-06 RX ADMIN — Medication 12.5 MG: at 20:48

## 2024-03-06 RX ADMIN — Medication 50 MCG: at 08:13

## 2024-03-06 RX ADMIN — METFORMIN ER 500 MG 1000 MG: 500 TABLET ORAL at 08:13

## 2024-03-06 RX ADMIN — INSULIN ASPART 1 UNITS: 100 INJECTION, SOLUTION INTRAVENOUS; SUBCUTANEOUS at 18:38

## 2024-03-06 RX ADMIN — MYCOPHENOLATE MOFETIL 500 MG: 250 CAPSULE ORAL at 08:13

## 2024-03-06 RX ADMIN — Medication 12.5 MG: at 08:13

## 2024-03-06 RX ADMIN — PREDNISONE 5 MG: 5 TABLET ORAL at 08:13

## 2024-03-06 RX ADMIN — METHOCARBAMOL 500 MG: 500 TABLET ORAL at 14:41

## 2024-03-06 RX ADMIN — METHOCARBAMOL 500 MG: 500 TABLET ORAL at 20:47

## 2024-03-06 RX ADMIN — LIDOCAINE HYDROCHLORIDE ANHYDROUS 5 ML: 10 INJECTION, SOLUTION INFILTRATION at 18:27

## 2024-03-06 RX ADMIN — URSODIOL 250 MG: 250 TABLET, FILM COATED ORAL at 08:13

## 2024-03-06 RX ADMIN — METFORMIN ER 500 MG 1000 MG: 500 TABLET ORAL at 18:37

## 2024-03-06 RX ADMIN — MYCOPHENOLATE MOFETIL 500 MG: 250 CAPSULE ORAL at 18:37

## 2024-03-06 RX ADMIN — INSULIN ASPART 1 UNITS: 100 INJECTION, SOLUTION INTRAVENOUS; SUBCUTANEOUS at 14:46

## 2024-03-06 RX ADMIN — URSODIOL 250 MG: 250 TABLET, FILM COATED ORAL at 20:48

## 2024-03-06 RX ADMIN — LEVOTHYROXINE SODIUM 150 MCG: 0.05 TABLET ORAL at 08:12

## 2024-03-06 RX ADMIN — ATORVASTATIN CALCIUM 40 MG: 40 TABLET, FILM COATED ORAL at 08:13

## 2024-03-06 RX ADMIN — AMLODIPINE BESYLATE 5 MG: 5 TABLET ORAL at 18:38

## 2024-03-06 RX ADMIN — ERTAPENEM SODIUM 1 G: 1 INJECTION, POWDER, LYOPHILIZED, FOR SOLUTION INTRAMUSCULAR; INTRAVENOUS at 01:29

## 2024-03-06 RX ADMIN — Medication 5 ML: at 20:48

## 2024-03-06 ASSESSMENT — ACTIVITIES OF DAILY LIVING (ADL)
ADLS_ACUITY_SCORE: 21

## 2024-03-06 NOTE — PLAN OF CARE
"Goal Outcome Evaluation:      Plan of Care Reviewed With: patient, spouse    Overall Patient Progress: improving    Outcome Evaluation: Bronch complete today and 2/3 AFB collected via sputum. 1 More sputum to be collected tonight at 2300 for TB r/o. PJP collected.     A&Ox4. U[place] speaking, in-person interpretor used. Denies pain. Productive cough. Denies SOB. No hemoptysis. Airborne precautions in place for TB r/o. Pt distressed about TB r/o as he is \"certain\" he does not have TB. Was NPO prior to Bronch. D5/LR running while NPO. Back to regular diet following bronch. Sliding scale insulin given and metformin restarted. SBA to bathroom.       "

## 2024-03-06 NOTE — PROGRESS NOTES
Resident/Fellow Attestation   I, Vani Stapleton MD, was present with the medical/IRVIN student who participated in the service and in the documentation of the note.  I have verified the history and personally performed the physical exam and medical decision making.  I agree with the assessment and plan of care as documented in the note.      Vani Stapleton MD  PGY3  Date of Service (when I saw the patient): 03/06/24    [facility]    Progress Note - Medicine Service, MAROON TEAM 4       Date of Admission:  2/28/2024    Assessment & Plan   Loy is a 69 y/o male with pmhx notable for alcohol-related cirrhosis c/b HCC s/p DDLT (2018), prostate cancer s/p RALP (1/2020), CKD, hypothyroidism, atrial fibrillation, hypertension & steroid/immunosuppression induced diabetes admitted for acute pyelonephritis & community acquired pneumonia      Today:  - Continue IV ertapenem (14 day course); no PO options to complete course based on sensitivities  - Vascular consult for PICC  - Awaiting final AFB sputum collection this PM  - BAL studies pending (bacterial/fungal culture, legionella, nocardia, MTB, aspergillus, PJP, cocci, histoplasmosis, CMV)  - Renal U/S for left flank pain  - Start methocarbamol 500 mg QID    Hemoptysis  Bilateral GGO  Community acquired pneumonia, treated w/ azithromycin and ceftriaxone  Hx of latent TB- treated  Upon admission, endorsed dyspnea, productive cough of yellow sputum. CXR with consolidation noted in RML and R/LLL opacities. Completed course of azithromycin and ceftriaxone. On 2/28 starting having streaks of blood in his sputum, however, he did not let anyone know until 3/3. Eliquis was started at 2.5 mg BID on 3/1 as hemoptysis was not brought up by patient prior to that. Oxygen requirements have remained stable. CT chest demonstrated bilateral GGO that could be alveolar hemorrhage vs infection. Procal elevated, but down trending.  Legionella, strep pneumo, coccidiodes, aspergillus, fungal antibodies, 1,3 beta D glucan, and respiratory panel negative.  - Pulmonology consulted  - S/p Bronchoscopy with BAL on 3/5/24  - BAL cell count: 125 nucleated cells (87% monocytes)  - BAL gram stain & KOH negative  - BAL studies still pending: bacterial/fungal culture, legionella, nocardia, MTB, aspergillus, PJP, coccidiodes, histoplasmosis, CMV  - Sputum AFB x 3 pending (awaiting final collection on 3/6/24 PM)  - Transplant ID consulted, appreciate cares:  - Will follow-up BAL cultures  - Maintain airborne precaution until active pulmonary TB can be fully evaluated  - Continue ertapenem 1 g daily as per below  - Antibiotics  - Azithromycin (2/28/24 - 3/1/24)  - Ceftriaxone (2/28/24 - 2/29/24)  - Ertapenem (3/1/24 - present)  - Hold anticoagulation    Acute pyelonephritis d/t ESBL E.coli  Hx of hemorrhagic cystitis with normal cystoscopy (2021)  Endorsed dysuria, bilateral flank pain, fevers, nausea/vomiting, reduced appetite. Was recently in [place] when symptoms started ~2 weeks ago. Prescribed levofloxacin, did not complete course and felt that symptoms have not improved. CT a/p with findings c/w bilateral pyelonephritis. Hx of prior UTI in 3/2023, grew panS E. Coli. Urine culture from admission with ESBL E.coli (sensitive to bactrim). Flank pain had resolved until 3/6/24 when pt endorsed left flank pain without dysuria. Pain could be musculoskeletal in origin from relative immobility throughout admission. However, demonstrated some CVA tenderness therefore obtained JAXSON to evaluate for renal obstruction vs abscess. US normal, so will trial robaxin.  - Continue ertapenem (3/1/24 - present); anticipate 14 day course of antibiotics   - Vascular consult for PICC placement   - Start methocarbamol 500 mg QID for flank pain    Atrial fibrillation (CHADSVASC 3)  Hx of post op afib previously on metoprolol (no longer) . EKG on arrival afib w/ rvr in the setting of  acute infection. TTE 2/28/24 with normal LVEF 60-65%, moderate mitral annular calcification with trace insufficiency, and trace TV insufficiency. CHADSVASC 3- Started on apixaban 2.5 mg BID but developed hemoptysis (as per above). Holding for now. Converted to sinus. Suspect AF triggered by infection. Will continue lopressor for now but decrease to 12.5 mg BID from 25 BID given HR in the 60s.  - Continue lopressor to 12.5 mg BID  - Hold eliquis as per above     Hx of cirrhosis c/b HCC s/p DDLT (2018)  Hx of biliary stricture requiring stent, last ERCP 2019  Follows with hepatology, last visit 3/2023. Endorses feeling that his abdomen is distended more than usual. No alcohol use. No tenderness. CT a/p with no ascites.  - Hepatology consulted, appreciate recs  - Continue PTA mycophenolate 500 mg BID (PTA dose 750 mg BID)  - Continue PTA Prednisone 5 mg daily   - Continue PTA Ursodiol 250 mg BID  - Plan for outpatient hepatology f/up    Type 2 diabetes  In the setting of steroids/immunosuppression. Follows with endocrinology as an outpatient, recent A1c 9.2%.  - Cont lantus 25 U QHS  - Cont PTA metformin 1000 mg this PM   - Hold PTA jardiance 10 mg 2/2 UTI  - JOANIE  - Hypoglycemia protocol    Hyponatremia - resolved  - CTM     DEREK - resolved  Non gap acidosis   Creatinine 1.26, not far from baseline of ~0.9. Likely pre-renal. Resolved with IVF.  - Daily BMP    Elevated troponin - resolved  Demand ischemia  45-->43 in the setting of afib w/ rvr, infection. No chest pain. EKG with no acute ischemic changes. Suspect demand ischemia.  - CTM     CAD s/p PCI to mLAD  Not on ASA but should be. Started apixaban this admission but had to hold given hemoptysis. Patient does not know why he is not on aspirin. Per last cardiology note he was to continue on 81 mg daily. Hx of hematuria and now hemoptysis, but no other bleeding events otherwise.  - Consider starting ASA 81 mg daily once hemoptysis resolved  - Continue Atorvastatin  "40 mg     Hypertension  - Hold PTA hydralazine BID   - Continue PTA amlodipine (restarted at 5 mg daily instead of 10 daily)     Hypothyroidism  - Continue PTA levothyroxine 150 mcg     Hx of prostate cancer s/p RALP (2020)  - Follows with Urology as outpatient        Diet: Regular Diet Adult    DVT Prophylaxis: Pneumatic Compression Devices  Cronin Catheter: Not present  Fluids: None  Lines: None     Cardiac Monitoring: None  Code Status: Full Code      Clinically Significant Risk Factors              # Hypoalbuminemia: Lowest albumin = 2.4 g/dL at 3/5/2024  5:54 AM, will monitor as appropriate     # Hypertension: Noted on problem list        # Overweight: Estimated body mass index is 29.91 kg/m  as calculated from the following:    Height as of this encounter: 1.702 m (5' 7\").    Weight as of this encounter: 86.6 kg (191 lb).             Disposition Plan      Expected Discharge Date: 03/08/2024      Destination: home with family  Discharge Comments: 3-06: R/O TB. IV Ertap for 14 days.   Dispo: Anticipate home with family  Plan: ertapenem (3/1 - *) x10-14d; bronchoscopy (3/5); AFB sputum x3  Progress: tele; Chest CT; Weaned to RA; Pulmo consult to eval hemoptysis        The patient's care was discussed with the Attending Physician, Dr. Zaragoza .    Nahomy Gonzalez  Medicine Service, [address]  Securely message with [employer] (more info)  Text page via [employer] Paging/Directory   See signed in provider for up to date coverage information  ______________________________________________________________________    Interval History   NAEO. Pt reports sleeping well overnight and feeling fine this AM. He is not coughing anymore, except when he needs to cough up sputum for cultures. He does endorse new left flank pain that started overnight, it is most apparent when lying on his back.     Physical Exam   Vital Signs: Temp: 98.3  F (36.8  C) Temp src: Oral BP: 136/84 Pulse: " 100   Resp: 16 SpO2: 93 % O2 Device: None (Room air) Oxygen Delivery: 1 LPM  Weight: 191 lbs 0 oz    Constitutional: awake, alert, cooperative, no apparent distress, and appears stated age  Eyes: Lids and lashes normal, sclera clear, conjunctiva normal  Respiratory: No increased work of breathing, good air exchange, clear to auscultation bilaterally, no crackles or wheezing  Cardiovascular: Normal apical impulse, regular rate and rhythm, normal S1 and S2, and no murmur noted  GI: Normal bowel sounds, soft, non-distended, non-tender  Neuropsychiatric: Normal, calm, and normal eye contact    Data     I have personally reviewed the following data over the past 24 hrs:    8.7  \   12.7 (L)   / 482 (H)     139 106 10.8 /  94   3.7 22 0.84 \     ALT: 27 AST: 26 AP: 301 (H) TBILI: 0.4   ALB: 2.6 (L) TOT PROTEIN: 6.0 (L) LIPASE: N/A       Imaging results reviewed over the past 24 hrs:   Recent Results (from the past 24 hour(s))   US Renal Complete Non-Vascular    Narrative    EXAMINATION: US RENAL COMPLETE NON-VASCULAR, 3/6/2024 1:37 PM     COMPARISON: CT abdomen and pelvis 2/28/2024.    HISTORY: 68-year-old male with bilateral pyelonephritis now with left  flank pain, concern for abscess or complications.    TECHNIQUE: The kidneys and bladder were scanned in the standard  fashion with specialized ultrasound transducer(s) using both gray  scale and limited color/spectral Doppler techniques.    FINDINGS:    Right kidney: Measures 12.8 cm in length. Parenchyma is of normal  thickness and echogenicity. No focal mass. No hydronephrosis.    Left kidney: Measures 11.2 cm in length. Parenchyma is of normal  thickness and echogenicity. No focal mass. No hydronephrosis.     Bladder: Mildly distended.      Impression    IMPRESSION:  No intra or perirenal fluid collections. Normal renal ultrasound.    I have personally reviewed the examination and initial interpretation  and I agree with the findings.    ROSHAN ALEXANDRE MD          SYSTEM ID:  [id number]

## 2024-03-06 NOTE — PROGRESS NOTES
"[facility] Pulmonology Follow up    Loy Limon  MRN: [id number]  : 1955    Date of Admission: 2024  Date of Service: 2024    Interval  -SpO2 > 92% on 0-2L overnight  -NAEO    Review of Systems  Focused review of systems completed and negative except as noted above in HPI.    Objective  BP (!) 164/87 (BP Location: Left arm)   Pulse 68   Temp 98.7  F (37.1  C) (Oral)   Resp 16   Ht 1.702 m (5' 7\")   Wt 86.6 kg (191 lb)   SpO2 94%   BMI 29.91 kg/m      Gen: in no acute distress, resting comfortably in hospitals  HEENT: MMM  CV: RRR, extremities warm, no peripheral edema  Pulm: no increased work of breathing, normal chest excursion, normal work of breathing on 1L NC  GI: soft, not distended  MSK: no gross deformities, no joint swelling  Integ: no rashes or lesions appreciated  Neuro: speech clear, alert and grossly oriented    Data  I have personally reviewed new labs. No new cardiopulmonary imaging.    Assessment  Hemoptysis  CAP  History of latent TB s/p treatment  Status post DDLT () on MMF and tacrolimus  ESBL E coli pyelonephritis    Two weeks of cough, dyspnea, presenting initially with blood-tinged sputum. CT with diffuse GGOs.  S/p bronchoscopy with BAL on 3/5 to evaluate for infectious etiologies, given immunosuppressed status. Fortunately, airways appeared normal and without any active bleeding during our evaluation. Thus far, no positive studies from bronchoscopy.    Recommendations  -AFB x 3 in process  -BAL studies, including AFB, cytology, cultures in process  -maintain airborne studies until pulmonary TB has been exonerated  -appreciate transplant ID involvement    Thank you for involving us in Mr. Limon's care. Pulmonary will sign off.     Staffed with Dr. Farley.    Maria De Jesus Wheatley MD PGY4  Pulmonary and Critical Care    "

## 2024-03-06 NOTE — PLAN OF CARE
"BP (!) 143/69 (BP Location: Left arm)   Pulse 89   Temp 97.5  F (36.4  C) (Oral)   Resp 20   Ht 1.702 m (5' 7\")   Wt 86.6 kg (191 lb)   SpO2 93%   BMI 29.91 kg/m      Care from: [id number]    VS & Pain: Pt has been hypertensive this shift and slightly tachycardic. No reports of pain.  Neuro: A+Ox4  Respiratory: WDL; sats have stayed above 92% on RA   Cardiac: Tachycardic w/ a fib  Peripheral neurovascular: WDL  GI/: WDL; voids to bedside urinal. No BM this shift but uses bathroom appropriately  Nutrition: low carb diabetic diet  Skin: WDL  Lines: L PIV running TKO for antibiotics  Activity: SBA  Events: Waiting for bronch results for TB rule out.    Plan:  Will continue current plan of care.  Goal Outcome Evaluation:      Plan of Care Reviewed With: patient, spouse          Outcome Evaluation: A+O, no reports of pain. Bronch done today for TB ruleout waiting for results. Abx given today.      "

## 2024-03-07 ENCOUNTER — HOME INFUSION (PRE-WILLOW HOME INFUSION) (OUTPATIENT)
Dept: PHARMACY | Facility: CLINIC | Age: 69
End: 2024-03-07
Payer: COMMERCIAL

## 2024-03-07 ENCOUNTER — APPOINTMENT (OUTPATIENT)
Dept: INTERPRETER SERVICES | Facility: CLINIC | Age: 69
DRG: 193 | End: 2024-03-07
Payer: COMMERCIAL

## 2024-03-07 LAB
ALBUMIN SERPL BCG-MCNC: 2.6 G/DL (ref 3.5–5.2)
ALP SERPL-CCNC: 273 U/L (ref 40–150)
ALT SERPL W P-5'-P-CCNC: 21 U/L (ref 0–70)
ANION GAP SERPL CALCULATED.3IONS-SCNC: 9 MMOL/L (ref 7–15)
ANNOTATION COMMENT IMP: NOT DETECTED
ASPERGILLUS AB TITR SER CF: NORMAL {TITER}
AST SERPL W P-5'-P-CCNC: 23 U/L (ref 0–45)
B DERMAT AB SER-ACNC: 0.2 IV
BACTERIA BRONCH: NORMAL
BASOPHILS # BLD AUTO: 0 10E3/UL (ref 0–0.2)
BASOPHILS NFR BLD AUTO: 0 %
BILIRUB DIRECT SERPL-MCNC: <0.2 MG/DL (ref 0–0.3)
BILIRUB SERPL-MCNC: 0.3 MG/DL
BUN SERPL-MCNC: 11 MG/DL (ref 8–23)
CALCIUM SERPL-MCNC: 8.1 MG/DL (ref 8.8–10.2)
CHLORIDE SERPL-SCNC: 106 MMOL/L (ref 98–107)
COCCIDIOIDES AB TITR SER CF: NORMAL {TITER}
CREAT SERPL-MCNC: 0.8 MG/DL (ref 0.67–1.17)
DEPRECATED HCO3 PLAS-SCNC: 23 MMOL/L (ref 22–29)
DEPRECATED M TB RPOB XXX QL NAA+PROBE: NORMAL
EGFRCR SERPLBLD CKD-EPI 2021: >90 ML/MIN/1.73M2
EOSINOPHIL # BLD AUTO: 0.3 10E3/UL (ref 0–0.7)
EOSINOPHIL NFR BLD AUTO: 4 %
ERYTHROCYTE [DISTWIDTH] IN BLOOD BY AUTOMATED COUNT: 13.4 % (ref 10–15)
GALACTOMANNAN AG BAL QL: NEGATIVE
GALACTOMANNAN AG SPEC IA-ACNC: 0.14
GLUCOSE BLDC GLUCOMTR-MCNC: 129 MG/DL (ref 70–99)
GLUCOSE BLDC GLUCOMTR-MCNC: 220 MG/DL (ref 70–99)
GLUCOSE BLDC GLUCOMTR-MCNC: 261 MG/DL (ref 70–99)
GLUCOSE BLDC GLUCOMTR-MCNC: 360 MG/DL (ref 70–99)
GLUCOSE SERPL-MCNC: 138 MG/DL (ref 70–99)
H CAPSUL MYC AB TITR SER CF: NORMAL {TITER}
H CAPSUL YST AB TITR SER CF: NORMAL {TITER}
HCT VFR BLD AUTO: 37.7 % (ref 40–53)
HGB BLD-MCNC: 12.1 G/DL (ref 13.3–17.7)
IMM GRANULOCYTES # BLD: 0.1 10E3/UL
IMM GRANULOCYTES NFR BLD: 1 %
LYMPHOCYTES # BLD AUTO: 1.3 10E3/UL (ref 0.8–5.3)
LYMPHOCYTES NFR BLD AUTO: 16 %
M TB DNA SPEC QL NAA+PROBE: NOT DETECTED
MCH RBC QN AUTO: 27.9 PG (ref 26.5–33)
MCHC RBC AUTO-ENTMCNC: 32.1 G/DL (ref 31.5–36.5)
MCV RBC AUTO: 87 FL (ref 78–100)
MONOCYTES # BLD AUTO: 0.5 10E3/UL (ref 0–1.3)
MONOCYTES NFR BLD AUTO: 6 %
NEUTROPHILS # BLD AUTO: 6 10E3/UL (ref 1.6–8.3)
NEUTROPHILS NFR BLD AUTO: 73 %
NRBC # BLD AUTO: 0 10E3/UL
NRBC BLD AUTO-RTO: 0 /100
P JIROVECII DNA SPEC QL NAA+PROBE: NOT DETECTED
P JIROVECII DNA SPEC QL NAA+PROBE: NOT DETECTED
PLATELET # BLD AUTO: 423 10E3/UL (ref 150–450)
POTASSIUM SERPL-SCNC: 4.1 MMOL/L (ref 3.4–5.3)
PROT SERPL-MCNC: 5.6 G/DL (ref 6.4–8.3)
RBC # BLD AUTO: 4.34 10E6/UL (ref 4.4–5.9)
SCANNED LAB RESULT: NORMAL
SODIUM SERPL-SCNC: 138 MMOL/L (ref 135–145)
WBC # BLD AUTO: 8.1 10E3/UL (ref 4–11)

## 2024-03-07 PROCEDURE — 36592 COLLECT BLOOD FROM PICC: CPT

## 2024-03-07 PROCEDURE — 250N000013 HC RX MED GY IP 250 OP 250 PS 637

## 2024-03-07 PROCEDURE — 250N000012 HC RX MED GY IP 250 OP 636 PS 637: Performed by: STUDENT IN AN ORGANIZED HEALTH CARE EDUCATION/TRAINING PROGRAM

## 2024-03-07 PROCEDURE — 85025 COMPLETE CBC W/AUTO DIFF WBC: CPT

## 2024-03-07 PROCEDURE — 250N000013 HC RX MED GY IP 250 OP 250 PS 637: Performed by: STUDENT IN AN ORGANIZED HEALTH CARE EDUCATION/TRAINING PROGRAM

## 2024-03-07 PROCEDURE — 250N000011 HC RX IP 250 OP 636

## 2024-03-07 PROCEDURE — 120N000002 HC R&B MED SURG/OB UMMC

## 2024-03-07 PROCEDURE — 250N000011 HC RX IP 250 OP 636: Mod: JZ

## 2024-03-07 PROCEDURE — 99233 SBSQ HOSP IP/OBS HIGH 50: CPT | Mod: GC | Performed by: STUDENT IN AN ORGANIZED HEALTH CARE EDUCATION/TRAINING PROGRAM

## 2024-03-07 PROCEDURE — 80053 COMPREHEN METABOLIC PANEL: CPT

## 2024-03-07 RX ORDER — AMLODIPINE BESYLATE 5 MG/1
5 TABLET ORAL ONCE
Status: COMPLETED | OUTPATIENT
Start: 2024-03-07 | End: 2024-03-07

## 2024-03-07 RX ORDER — AMLODIPINE BESYLATE 10 MG/1
10 TABLET ORAL DAILY
Status: DISCONTINUED | OUTPATIENT
Start: 2024-03-08 | End: 2024-03-09 | Stop reason: HOSPADM

## 2024-03-07 RX ORDER — ASPIRIN 81 MG/1
81 TABLET, CHEWABLE ORAL DAILY
Status: DISCONTINUED | OUTPATIENT
Start: 2024-03-07 | End: 2024-03-09 | Stop reason: HOSPADM

## 2024-03-07 RX ORDER — HYDRALAZINE HYDROCHLORIDE 25 MG/1
25 TABLET, FILM COATED ORAL 2 TIMES DAILY
Status: DISCONTINUED | OUTPATIENT
Start: 2024-03-07 | End: 2024-03-09 | Stop reason: HOSPADM

## 2024-03-07 RX ADMIN — MYCOPHENOLATE MOFETIL 500 MG: 250 CAPSULE ORAL at 08:41

## 2024-03-07 RX ADMIN — INSULIN ASPART 3 UNITS: 100 INJECTION, SOLUTION INTRAVENOUS; SUBCUTANEOUS at 17:05

## 2024-03-07 RX ADMIN — METFORMIN ER 500 MG 1000 MG: 500 TABLET ORAL at 17:00

## 2024-03-07 RX ADMIN — LEVOTHYROXINE SODIUM 150 MCG: 0.05 TABLET ORAL at 08:42

## 2024-03-07 RX ADMIN — AMLODIPINE BESYLATE 5 MG: 5 TABLET ORAL at 08:42

## 2024-03-07 RX ADMIN — ASPIRIN 81 MG CHEWABLE TABLET 81 MG: 81 TABLET CHEWABLE at 12:58

## 2024-03-07 RX ADMIN — POLYETHYLENE GLYCOL 3350 17 G: 17 POWDER, FOR SOLUTION ORAL at 08:41

## 2024-03-07 RX ADMIN — ATORVASTATIN CALCIUM 40 MG: 40 TABLET, FILM COATED ORAL at 08:42

## 2024-03-07 RX ADMIN — MYCOPHENOLATE MOFETIL 500 MG: 250 CAPSULE ORAL at 17:00

## 2024-03-07 RX ADMIN — PREDNISONE 5 MG: 5 TABLET ORAL at 08:42

## 2024-03-07 RX ADMIN — METHOCARBAMOL 500 MG: 500 TABLET ORAL at 21:35

## 2024-03-07 RX ADMIN — URSODIOL 250 MG: 250 TABLET, FILM COATED ORAL at 08:42

## 2024-03-07 RX ADMIN — URSODIOL 250 MG: 250 TABLET, FILM COATED ORAL at 21:35

## 2024-03-07 RX ADMIN — METHOCARBAMOL 500 MG: 500 TABLET ORAL at 08:42

## 2024-03-07 RX ADMIN — Medication 12.5 MG: at 21:35

## 2024-03-07 RX ADMIN — METFORMIN ER 500 MG 1000 MG: 500 TABLET ORAL at 08:42

## 2024-03-07 RX ADMIN — Medication 12.5 MG: at 08:41

## 2024-03-07 RX ADMIN — HYDRALAZINE HYDROCHLORIDE 25 MG: 25 TABLET, FILM COATED ORAL at 21:35

## 2024-03-07 RX ADMIN — AMLODIPINE BESYLATE 5 MG: 5 TABLET ORAL at 12:58

## 2024-03-07 RX ADMIN — METHOCARBAMOL 500 MG: 500 TABLET ORAL at 12:58

## 2024-03-07 RX ADMIN — ERTAPENEM SODIUM 1 G: 1 INJECTION, POWDER, LYOPHILIZED, FOR SOLUTION INTRAMUSCULAR; INTRAVENOUS at 01:19

## 2024-03-07 RX ADMIN — ACETAMINOPHEN 650 MG: 325 TABLET, FILM COATED ORAL at 21:46

## 2024-03-07 RX ADMIN — METHOCARBAMOL 500 MG: 500 TABLET ORAL at 17:00

## 2024-03-07 RX ADMIN — Medication 5 ML: at 21:36

## 2024-03-07 ASSESSMENT — ACTIVITIES OF DAILY LIVING (ADL)
ADLS_ACUITY_SCORE: 21

## 2024-03-07 NOTE — PLAN OF CARE
Goal Outcome Evaluation:      Plan of Care Reviewed With: patient    Overall Patient Progress: no change    Outcome Evaluation: Last 2 AFB samples ordered were collected today. Remains on airborne precautions awaiting TB r/o. PICC placed for continued IV abx treatment. Awaiting order for PICC okay to use.     A&Ox4. Endorses mild flank pain, scheduled robaxin to relief. Mexican speaking, interpretor used. HTN, all other VSS on RA. Denies SOB. Infrequent cough. US for kidney WDL. Up SBA to bathroom.

## 2024-03-07 NOTE — PROGRESS NOTES
Home Infusion  Loy is expected to discharge soon and will be going home on IV ertapenem q 24 hrs.   He has never done home IV therapy before and will benefit with some information on what to expect.      Spoke with Loy using phone  to discuss discharge plans and provide him with information about IV abx therapy with [facility] services.  Explained about administration method which will be via hp ball  and explained that a nurse will provide teaching at his home the day after discharge.   Informed him about supplies and delivery of supplies, printed teaching materials in addition to the in person nurse visit, storage of medication, dosing schedule, plan for SNVs and 24/7 availability of [facility] staff while on IV therapy.       Loy verbalized understanding of all information given.   He is willing and able to learn and manage home IV therapy; stated no vision problems or fine motor that would impede his ability to self administer.   Questions answered.  Will continue to follow update pt with final details for delivery of drug and supplies as well as SNV once discharge is confirmed.    Marcy HARRIS  Nurse Liaison  [facility] I [email or web address]  [address] [place] [address]  [email or web address]  [phone] M-F I [facility] main: [phone]

## 2024-03-07 NOTE — PROGRESS NOTES
"Primary team:  Place order panel  OPAT Pharmacy ([facility]) Review and ID Care Transition   Place imaging order(s) requested above prior to discharge.  Contact ID teams about missed ID clinic appointments and/or ongoing therapy needs.    Prolonged Parenteral/Oral Antibiotic Recommendations and ID Follow up  This template provides final ID recommendations as of this date.     Infectious Diseases Indication: ESBL E Coli pyelonephritis    Antibiotic Information  Name of Antibiotic Dose of Antibiotic1 Anticipated duration Effective start date2 End date   Ertapenem 1 g daily IV 2 weeks 3/1/2024 3/14/2024                 1.Dose of antibiotic will need to be renally adjusted if creatinine clearance changes  2.Effective start date is the date of adequate therapy with appropriate spectrum    Method of antibiotic delivery:PICC line.Is the line being used for another indication besides antimicrobials? No At the end of therapy should the line used for antimicrobials be removed or de-accessed? Yes. Selecting \"yes\" will function as written order to remove PICC line or de-access the indwelling line at the end of therapy.    Weekly labs required:  CMP, CBC with differential . Dr. Capone will follow labs at discharge until ID follow up. Fax labs to ID clinic.    Are there pending microbial tests: No     Imaging for ID follow up: ID Imaging: No    All OPAT discharges will be scheduled within 2 weeks of discharge unless otherwise specified with another provider. Type of clinic appointment Okay for in person or a virtual visit.   If additional appointments are needed later in the antibiotic course specify the provider [place], timing of visit  6 weeks , and the type of visit Okay for in person or a virtual visit.     ID provider route note: OPAT RN Care Coordinator [facility] and [facility] ID Clinic Information:  Phone: [phone]  Fax: [phone] (Attention Marlene Greene)  "

## 2024-03-07 NOTE — PLAN OF CARE
Problem: Infection  Goal: Absence of Infection Signs and Symptoms  Outcome: Progressing    VSS remain stable, restarted select PTA HTN medications today, no PRN HTN medications indicated. BG monitored before meals; pt skipped lunch, plans to eat dinner. BG results improved from overnight, noted afternoon increased result due to consumption of snack without informing (bowl of rice and cup of juice/soda) prior to dinner, skewed pre dinner BG. Airborne precautions remain in place for TB rule out; results in process. Spouse remains at bedside. Discharge planning in place for outpatient IV abx via R arm PICC, tentative discharge tomorrow (3/8).

## 2024-03-07 NOTE — PROVIDER NOTIFICATION
Provider notified (name): Kira JUÁREZ  Reason for notification: BS elevated   Recommendation/request given to provider: add coverage  Response from provider: added some coverage

## 2024-03-07 NOTE — PROGRESS NOTES
Resident/Fellow Attestation   I, Vani Stapleton MD, was present with the medical/IRVIN student who participated in the service and in the documentation of the note.  I have verified the history and personally performed the physical exam and medical decision making.  I agree with the assessment and plan of care as documented in the note.      Vani Stapleton MD  PGY3  Date of Service (when I saw the patient): 03/07/24    [facility]    Progress Note - Medicine Service, MAROON TEAM 4       Date of Admission:  2/28/2024    Assessment & Plan   Loy is a 69 y/o male with pmhx notable for alcohol-related cirrhosis c/b HCC s/p DDLT (2018), prostate cancer s/p RALP (1/2020), CKD, hypothyroidism, atrial fibrillation, hypertension & steroid/immunosuppression induced diabetes admitted for acute pyelonephritis & community acquired pneumonia      Today:  - Continue IV ertapenem (14 day course); no PO options to complete course based on sensitivities  - AFB sputum results pending  - BAL studies pending  - Ordered aspirin 81mg for CAD  - Restarted PTA hydralazine  - Increased amlodipine to PTA dose (10mg)  - Increased Lantus to 28U to control for overnight hyperglycemia    Acute pyelonephritis d/t ESBL E.coli  Hx of hemorrhagic cystitis with normal cystoscopy (2021)  Upon admission, endorsed dysuria, bilateral flank pain, fevers, nausea/vomiting, reduced appetite. Was recently in [place] when symptoms started ~2 weeks ago. Prescribed levofloxacin, did not complete course and felt that symptoms have not improved. CT a/p with findings c/w bilateral pyelonephritis. Hx of prior UTI in 3/2023, grew panS E. Coli. Urine culture from admission with ESBL E.coli (sensitive to bactrim). Flank pain had resolved until 3/6/24 when pt endorsed left flank pain without dysuria. Pain could be musculoskeletal in origin from relative immobility throughout admission. However, demonstrated  some CVA tenderness therefore obtained JAXSON to evaluate for renal obstruction vs abscess. US normal, and trial of robaxin did help with pain.   - Continue ertapenem (3/1/24 - present); anticipate 14 day course of antibiotics    > PICC in place   > [facility] ([facility]) Review and ID Care Transition referral ordered   > Will need weekly CBC w/ diff & CMP (cc'd to Dr. Capone)  - Methocarbamol 500 mg QID for flank pain    Hemoptysis  Bilateral GGO  Community acquired pneumonia, treated w/ azithromycin and ceftriaxone  Hx of latent TB- treated  Upon admission, endorsed dyspnea, productive cough of yellow sputum. CXR with consolidation noted in RML and R/LLL opacities. Completed course of azithromycin and ceftriaxone. On 2/28 starting having streaks of blood in his sputum, however, he did not let anyone know until 3/3. Eliquis was started at 2.5 mg BID on 3/1 as hemoptysis was not brought up by patient prior to that. Oxygen requirements have remained stable. CT chest demonstrated bilateral GGO that could be alveolar hemorrhage vs infection. Procal elevated, but down trending. Legionella, strep pneumo, coccidiodes, aspergillus, fungal antibodies, 1,3 beta D glucan, and respiratory panel negative. Given history of latent TB with new-onset hemoptysis, pulmonology and transplant ID were consulted. Bronchoscopy with BAL was performed on 3/5/24.    - Pulmonology consulted  - S/p Bronchoscopy with BAL on 3/5/24  - BAL cell count: 125 nucleated cells (87% monocytes)  - BAL gram stain, KOH, legionella, nocardia, fungal culture, viral panel negative  - BAL bacterial culture growing normal luc  - BAL studies still pending: coccidioides and histoplasmosis  - Sputum AFB negative for 2 out 3 collections  - Transplant ID consulted, appreciate cares:  - Will follow-up BAL cultures  - Maintain airborne precaution until active pulmonary TB can be fully evaluated  - Continue ertapenem 1 g daily as per below  - Antibiotics  - Azithromycin  (2/28/24 - 3/1/24)  - Ceftriaxone (2/28/24 - 2/29/24)  - Ertapenem (3/1/24 - present)  - Hold anticoagulation    Atrial fibrillation (CHADSVASC 3)  Hx of post op afib previously on metoprolol (no longer) . EKG on arrival afib w/ rvr in the setting of acute infection. TTE 2/28/24 with normal LVEF 60-65%, moderate mitral annular calcification with trace insufficiency, and trace TV insufficiency. CHADSVASC 3- Started on apixaban 2.5 mg BID but developed hemoptysis (as per above). Holding for now. Converted to sinus. Suspect AF triggered by infection. Will continue lopressor for now but decrease to 12.5 mg BID from 25 BID given HR in the 60s.  - Continue lopressor to 12.5 mg BID  - Hold eliquis as per above     Hx of cirrhosis c/b HCC s/p DDLT (2018)  Hx of biliary stricture requiring stent, last ERCP 2019  Follows with hepatology, last visit 3/2023. Endorses feeling that his abdomen is distended more than usual. No alcohol use. No tenderness. CT a/p with no ascites.  - Hepatology consulted, appreciate recs  - Continue PTA mycophenolate 500 mg BID (PTA dose 750 mg BID)  - Continue PTA Prednisone 5 mg daily   - Continue PTA Ursodiol 250 mg BID  - Plan for outpatient hepatology f/up    Type 2 diabetes  In the setting of steroids/immunosuppression. Follows with endocrinology as an outpatient, recent A1c 9.2%.  - Increase PTA lantus to 28 U QHS  - Cont PTA metformin 1000 mg   - Hold PTA jardiance 10 mg 2/2 UTI  - JOANIE  - Hypoglycemia protocol    Hyponatremia - resolved  - CTM     DEREK - resolved  Non gap acidosis   Creatinine 1.26, not far from baseline of ~0.9. Likely pre-renal. Resolved with IVF.  - Daily BMP    Elevated troponin - resolved  Demand ischemia  45-->43 in the setting of afib w/ rvr, infection. No chest pain. EKG with no acute ischemic changes. Suspect demand ischemia.  - CTM     CAD s/p PCI to mLAD  Not on ASA but should be. Started apixaban this admission but had to hold given hemoptysis. Patient does not  "know why he is not on aspirin. Per last cardiology note he was to continue on 81 mg daily. Hx of hematuria and developed hemoptysis during this hospitalization, but no other bleeding events otherwise.  - Started ASA 81 mg daily, now that hemoptysis has resolved  - Continue Atorvastatin 40 mg     Hypertension  - Continue PTA hydralazine BID   - Continue PTA amlodipine 10 mg daily     Hypothyroidism  - Continue PTA levothyroxine 150 mcg     Hx of prostate cancer s/p RALP (2020)  - Follows with Urology as outpatient        Diet: Regular Diet Adult  Snacks/Supplements Adult: Other; Ensure max with lunch trays; With Meals    DVT Prophylaxis: Pneumatic Compression Devices  Cronin Catheter: Not present  Fluids: None  Lines: PRESENT      PICC 03/06/24 Single Lumen Right Basilic Antibiotic-Site Assessment: WDL    Cardiac Monitoring: None  Code Status: Full Code      Clinically Significant Risk Factors              # Hypoalbuminemia: Lowest albumin = 2.4 g/dL at 3/5/2024  5:54 AM, will monitor as appropriate     # Hypertension: Noted on problem list        # Overweight: Estimated body mass index is 29.91 kg/m  as calculated from the following:    Height as of this encounter: 1.702 m (5' 7\").    Weight as of this encounter: 86.6 kg (191 lb).             Disposition Plan      Expected Discharge Date: 03/08/2024      Destination: home with family  Discharge Comments: Dispo: home + home infusion   Plan: ertapenem (3/1 - *) x14d; bronchoscopy (3/5); final sputum Cx; PICC   Progress: tele; Chest CT; Weaned to RA; Pulmo consult for hemoptysis; BAL studies pending        The patient's care was discussed with the Attending Physician, Dr. Zaragoza .    Nahomy Gonzalez  Medicine Service, [facility] TEAM [address] [place] [address]  Securely message with [employer] (more info)  Text page via [employer] Paging/Directory   See signed in provider for up to date coverage " information  ______________________________________________________________________    Interval History   NAEO. Pt reports sleeping well overnight and feeling fine this AM. He is only coughing a little bit, and denies any further hemoptysis. He still endorses left flank pain, that is well controlled with pain medication (methocarbamol). Otherwise, he is eating well, without dysuria or changes in bowel movement. Pt is looking forward to going home soon.     Physical Exam   Vital Signs: Temp: 98.2  F (36.8  C) Temp src: Oral BP: 123/83 Pulse: 107   Resp: 16 SpO2: 94 % O2 Device: None (Room air)    Weight: 191 lbs 0 oz    Constitutional: awake, alert, cooperative, no apparent distress, and appears stated age  Eyes: Lids and lashes normal, sclera clear, conjunctiva normal  Respiratory: No increased work of breathing, good air exchange, clear to auscultation bilaterally, no crackles or wheezing  Cardiovascular: Normal apical impulse, regular rate and rhythm, normal S1 and S2, and no murmur noted  GI: Normal bowel sounds, soft, non-distended, non-tender  Neuropsychiatric: Normal, calm, and normal eye contact    Data     I have personally reviewed the following data over the past 24 hrs:    8.1  \   12.1 (L)   / 423     138 106 11.0 /  129 (H)   4.1 23 0.80 \     ALT: 21 AST: 23 AP: 273 (H) TBILI: 0.3   ALB: 2.6 (L) TOT PROTEIN: 5.6 (L) LIPASE: N/A       Imaging results reviewed over the past 24 hrs:   Recent Results (from the past 24 hour(s))   US Renal Complete Non-Vascular    Narrative    EXAMINATION: US RENAL COMPLETE NON-VASCULAR, 3/6/2024 1:37 PM     COMPARISON: CT abdomen and pelvis 2/28/2024.    HISTORY: 68-year-old male with bilateral pyelonephritis now with left  flank pain, concern for abscess or complications.    TECHNIQUE: The kidneys and bladder were scanned in the standard  fashion with specialized ultrasound transducer(s) using both gray  scale and limited color/spectral Doppler  techniques.    FINDINGS:    Right kidney: Measures 12.8 cm in length. Parenchyma is of normal  thickness and echogenicity. No focal mass. No hydronephrosis.    Left kidney: Measures 11.2 cm in length. Parenchyma is of normal  thickness and echogenicity. No focal mass. No hydronephrosis.     Bladder: Mildly distended.      Impression    IMPRESSION:  No intra or perirenal fluid collections. Normal renal ultrasound.    I have personally reviewed the examination and initial interpretation  and I agree with the findings.    ROSHAN ALEXANDRE MD         SYSTEM ID:  [id number]   XR Chest Port 1 View    Narrative    Exam: Chest radiograph 3/6/2024 6:40 PM    History: eval PICC    Comparison: X-ray 3/2/2024, CT 3/3/2024.     Findings: Portable AP view of the chest. Right upper extremity PICC  with tip projecting over the right atrium. Cardiomediastinal contours  stable. Similar perihilar predominant mixed interstitial and airspace  opacities. Blunting of the right costophrenic angle. The left  costophrenic angle is collimated off the field-of-view. No  pneumothorax. Osseous structures and upper abdomen are unremarkable.      Impression    Impression:   1. Right upper extremity PICC with tip projecting over the right  atrium..  2. Pulmonary findings better characterized on CT 3/3/2024 concerning  for infectious or inflammatory etiology.    I have personally reviewed the examination and initial interpretation  and I agree with the findings.    ROSHAN ALEXANDRE MD         SYSTEM ID:  [id number]   XR Chest Port 1 View    Narrative    Exam: XR CHEST PORT 1 VIEW, 3/6/2024 7:39 PM    Indication: eval PICC    Comparison: Chest radiograph 3/6/2024    Findings:   Portable AP semiupright view of the chest was obtained. Right  approximately PICC tip terminates in the mid SVC. Normal  cardiomediastinal silhouette. Mild blunting of the costophrenic  angles. Trace right pleural effusion. No appreciable pneumothorax.  Perihilar/basilar opacities,  similar compared to earlier today.      Impression    Impression: Right upper extremity PICC tip terminates in the mid SVC.  Otherwise stable exam compared earlier today.    I have personally reviewed the examination and initial interpretation  and I agree with the findings.    ROSHAN ALEXANDRE MD         SYSTEM ID:  [id number]

## 2024-03-07 NOTE — PROGRESS NOTES
Therapy: IV abx    Insurance: [facility]   Pt has coverage for IV abx with [facility] , coverage should be at 100%, however he may have a copay per dispense on the drug through his pharmacy plan.    In reference to admission on 2/28/24 to check IV abx coverage      Please contact Intake with any questions, [phone] or In Basket pool, [facility] ([phone]).

## 2024-03-07 NOTE — PROGRESS NOTES
Care Management Follow Up    Length of Stay (days): 8  Expected Discharge Date: 03/08/2024  Concerns to be Addressed: discharge planning     Patient plan of care discussed at interdisciplinary rounds: Yes  Anticipated Discharge Disposition: Home  Anticipated Discharge Services: Home Infusion   Anticipated Discharge DME: PICC supplies  Patient/family educated on Medicare website which has current facility and service quality ratings:  n/a  Education Provided on the Discharge Plan:  yes  Patient/Family in Agreement with the Plan:  yes    Referrals Placed by CM/SW:      [facility]  [address] [place] [address]  Phone: [phone]  Fax: [phone]  IV Abx (thru 3/14): ertapenem 1g - daily  Pt has coverage for IV abx with [facility], likely at 100%, however he may have a drug dispense copay per via his pharmacy plan.    Private pay costs discussed:  [facility] benefit check, potential drug dispensing cost     Additional Information:  Primary Medicine team provided tentative discharge planning via in-person rounding; Pt likely medically ready for discharge Friday (3/8) with ongoing IV Abx needs. See above for benefit coverage, via [facility] intake. Infusion Liaison, Marcy PRAKASH notified of anticipated discharge date and use of  services. RNCC will continue to follow and support safe discharge planning.       Herbie Fierro RN BSN  7C RN[facility]  Phone: [phone]  Pager: [phone]    For Weekend & Holiday on call RN Care Coordinator:  (Tasks: Home care, home infusion, medical equipment, transportation, IMM & CACERES forms, etc.)  [facility] (0800  1630) Saturday and Sunday    Units: 5A, 5B, & 5C: [phone]    Units: 6B, 6C, 6D: [phone]    Units: 7A, 7B, 7C, 7D: [phone]    Units: 6A/ICU : [phone]    [facility] [place], [place] ([phone]) Saturday and Sunday    Units: 6 Med/Surg, 8A, 10 ICU, & Pediatric Units: [phone]    Units: 5 Ortho, 5Med/Surg & WB ED: [phone]

## 2024-03-07 NOTE — PROGRESS NOTES
Home Infusion  Received referral from Herbie Fierro RNCC for IV Ertapenem.  Benefits verified.  Patient has [employer] insurance and is covered 100%. He may have a copay per dispense on drug through pharmacy plan.  Called and spoke with Loy with an  to review home infusion services, review benefits and offer choice of providers.  Patient would like [facility] remain in the [facility] and will use [facility] for home infusion.  Confirmed discharge address, phone, and emergency contact information. Patient denies recent illness (not related to pre-existing conditions) or travel in the house hold. Confirmed allergies. No home health agency has been seeing the patient.     Loy is willing to learn and manage home IV therapy.  Questions answered.    [facility] will continue to follow until discharge and update pt once final orders are determined.    Thank you for the referral    Sumit Barriga LPN, Coordinator   [facility]   [email or web address]  Office: [phone]

## 2024-03-07 NOTE — PROCEDURES
[facility]    Single Lumen PICC Placement    Date/Time: 3/6/2024 8:12 PM    Performed by: Alyssia Jacob RN  Authorized by: Vani Stapleton MD  Indications: vascular access      UNIVERSAL PROTOCOL   Site Marked: NA  Prior Images Obtained and Reviewed:  NA  Required items: Required blood products, implants, devices and special equipment available    Patient identity confirmed:  Verbally with patient, arm band, provided demographic data and hospital-assigned identification number  NA - No sedation, light sedation, or local anesthesia  Confirmation Checklist:  Patient's identity using two indicators, relevant allergies and correct equipment/implants were available  Time out: Immediately prior to the procedure a time out was called    Universal Protocol: the Joint Commission Universal Protocol was followed    Preparation: Patient was prepped and draped in usual sterile fashion       ANESTHESIA    Anesthesia:  See MAR for details  Local Anesthetic:  Lidocaine 1% without epinephrine  Anesthetic Total (mL):  3      SEDATION    Patient Sedated: No        Preparation: skin prepped with ChloraPrep  Skin prep agent: skin prep agent completely dried prior to procedure  Sterile barriers: maximum sterile barriers were used: cap, mask, sterile gown, sterile gloves, and large sterile sheet  Hand hygiene: hand hygiene performed prior to central venous catheter insertion  Type of line used: PICC  Catheter type: single lumen  Lumen type: power PICC and non-valved  Catheter size: 4 Fr  Brand: Breadtrip  Lot number: [id number]  Placement method: venipuncture, MST and ultrasound  Number of attempts: 1  Difficulty threading catheter: no  Successful placement: yes  Orientation: right  Catheter to Vein (%): 23  Location: basilic vein  Tip Location: SVC  Arm circumference: adults 10 cm  Extremity circumference: 30  Visible catheter length: 7  Total catheter length: 47  Dressing and securement:  adhesive securement device, chlorhexidine disc applied, blood cleaned with CHG, dressing applied, gloves changed prior to final dressing, glue, securement device, site cleansed, statlock, sterile dressing applied, tissue adhesive and transparent dressing  Post procedure assessment: placement verified by x-ray  PROCEDURE Describe Procedure: Ok to use picc   Disposal: sharps and needle count correct at the end of procedure, needles and guidewire disposed in sharps container

## 2024-03-07 NOTE — PLAN OF CARE
"Shift [id number]  Goal Outcome Evaluation:    BP (!) 149/70 (BP Location: Left arm)   Pulse 93   Temp 97.6  F (36.4  C) (Oral)   Resp 16   Ht 1.702 m (5' 7\")   Wt 86.6 kg (191 lb)   SpO2 95%   BMI 29.91 kg/m           Plan of Care Reviewed With: Patient    Overall Patient Progress: no change      PT noted to be comfortable during this shift, VSS, denies having pain and discomfort. PT BS check done, sliding scale insulin and schedule lantus administer per MD order. BS @ 0200, noted to be slightly elevated, BS- 306, MD updated, 2 units of novolog administer, pt asymptomatic. Voiding spon, no bm noted on this shift. R PICC  single lumen is patent, no acute events noted on this shift.                 "

## 2024-03-07 NOTE — PROCEDURES
[facility]    Single lumen PICC line.    Date/Time: 3/6/2024 6:21 PM    Performed by: Alyssia Jacob RN  Authorized by: Vani Stapleton MD  Indications: vascular access      UNIVERSAL PROTOCOL   Site Marked: NA  Prior Images Obtained and Reviewed:  NA  Required items: Required blood products, implants, devices and special equipment available    Patient identity confirmed:  Verbally with patient, arm band and hospital-assigned identification number  NA - No sedation, light sedation, or local anesthesia  Confirmation Checklist:  Patient's identity using two indicators, relevant allergies, procedure was appropriate and matched the consent or emergent situation and correct equipment/implants were available (with interpeter)  Time out: Immediately prior to the procedure a time out was called    Universal Protocol: the Joint Commission Universal Protocol was followed    Preparation: Patient was prepped and draped in usual sterile fashion       ANESTHESIA    Anesthesia:  See MAR for details  Local Anesthetic:  Lidocaine 1% without epinephrine  Anesthetic Total (mL):  3      SEDATION    Patient Sedated: No        Preparation: skin prepped with ChloraPrep  Sterile barriers: maximum sterile barriers were used: cap, mask, sterile gown, sterile gloves, and large sterile sheet  Hand hygiene: hand hygiene performed prior to central venous catheter insertion  Type of line used: PICC  Catheter type: single lumen  Lumen type: non-valved  Catheter size: 4 Fr  Brand: Bard  Lot number: [id number]  Placement method: venipuncture, MST and ultrasound  Number of attempts: 1  Difficulty threading catheter: no  Successful placement: yes  Orientation: right  Catheter to Vein (%): 23  Location: basilic vein (vein diameter 0.60)  Tip Location: superior vena cava  Arm circumference: adults 10 cm  Extremity circumference: 30  Visible catheter length: 0  Total catheter length: 47  Dressing and  securement: adhesive securement device, blood cleaned with CHG, blood removed, chlorhexidine disc applied, dressing applied, gloves changed prior to final dressing, glue, securement device, site cleansed, statlock, sterile dressing applied, tissue adhesive, transparent dressing and transparent securement dressing  Post procedure assessment: placement verified by x-ray  PROCEDURE   Patient Tolerance:  Patient tolerated the procedure well with no immediate complicationsDescribe Procedure: Picc originally in to deep, pulled back 6cm.  Picc is not ready to use.

## 2024-03-08 LAB
ALBUMIN SERPL BCG-MCNC: 2.6 G/DL (ref 3.5–5.2)
ALP SERPL-CCNC: 249 U/L (ref 40–150)
ALT SERPL W P-5'-P-CCNC: 19 U/L (ref 0–70)
ANION GAP SERPL CALCULATED.3IONS-SCNC: 7 MMOL/L (ref 7–15)
AST SERPL W P-5'-P-CCNC: 18 U/L (ref 0–45)
BASOPHILS # BLD AUTO: 0 10E3/UL (ref 0–0.2)
BASOPHILS NFR BLD AUTO: 1 %
BILIRUB DIRECT SERPL-MCNC: <0.2 MG/DL (ref 0–0.3)
BILIRUB SERPL-MCNC: 0.3 MG/DL
BUN SERPL-MCNC: 14 MG/DL (ref 8–23)
CALCIUM SERPL-MCNC: 8.1 MG/DL (ref 8.8–10.2)
CHLORIDE SERPL-SCNC: 107 MMOL/L (ref 98–107)
CREAT SERPL-MCNC: 0.82 MG/DL (ref 0.67–1.17)
DEPRECATED HCO3 PLAS-SCNC: 28 MMOL/L (ref 22–29)
EGFRCR SERPLBLD CKD-EPI 2021: >90 ML/MIN/1.73M2
EOSINOPHIL # BLD AUTO: 0.3 10E3/UL (ref 0–0.7)
EOSINOPHIL NFR BLD AUTO: 4 %
ERYTHROCYTE [DISTWIDTH] IN BLOOD BY AUTOMATED COUNT: 13.5 % (ref 10–15)
GLUCOSE BLDC GLUCOMTR-MCNC: 120 MG/DL (ref 70–99)
GLUCOSE BLDC GLUCOMTR-MCNC: 140 MG/DL (ref 70–99)
GLUCOSE BLDC GLUCOMTR-MCNC: 149 MG/DL (ref 70–99)
GLUCOSE BLDC GLUCOMTR-MCNC: 92 MG/DL (ref 70–99)
GLUCOSE BLDC GLUCOMTR-MCNC: 97 MG/DL (ref 70–99)
GLUCOSE SERPL-MCNC: 89 MG/DL (ref 70–99)
HCT VFR BLD AUTO: 36 % (ref 40–53)
HGB BLD-MCNC: 11.2 G/DL (ref 13.3–17.7)
IMM GRANULOCYTES # BLD: 0 10E3/UL
IMM GRANULOCYTES NFR BLD: 0 %
LYMPHOCYTES # BLD AUTO: 1.5 10E3/UL (ref 0.8–5.3)
LYMPHOCYTES NFR BLD AUTO: 19 %
MCH RBC QN AUTO: 27.3 PG (ref 26.5–33)
MCHC RBC AUTO-ENTMCNC: 31.1 G/DL (ref 31.5–36.5)
MCV RBC AUTO: 88 FL (ref 78–100)
MONOCYTES # BLD AUTO: 0.5 10E3/UL (ref 0–1.3)
MONOCYTES NFR BLD AUTO: 6 %
NEUTROPHILS # BLD AUTO: 5.7 10E3/UL (ref 1.6–8.3)
NEUTROPHILS NFR BLD AUTO: 70 %
NRBC # BLD AUTO: 0 10E3/UL
NRBC BLD AUTO-RTO: 0 /100
PLATELET # BLD AUTO: 434 10E3/UL (ref 150–450)
POTASSIUM SERPL-SCNC: 4.1 MMOL/L (ref 3.4–5.3)
PROT SERPL-MCNC: 5.5 G/DL (ref 6.4–8.3)
RBC # BLD AUTO: 4.1 10E6/UL (ref 4.4–5.9)
SODIUM SERPL-SCNC: 142 MMOL/L (ref 135–145)
WBC # BLD AUTO: 8 10E3/UL (ref 4–11)

## 2024-03-08 PROCEDURE — 250N000013 HC RX MED GY IP 250 OP 250 PS 637

## 2024-03-08 PROCEDURE — 82310 ASSAY OF CALCIUM: CPT

## 2024-03-08 PROCEDURE — 99232 SBSQ HOSP IP/OBS MODERATE 35: CPT | Mod: GC | Performed by: INTERNAL MEDICINE

## 2024-03-08 PROCEDURE — 250N000012 HC RX MED GY IP 250 OP 636 PS 637: Performed by: STUDENT IN AN ORGANIZED HEALTH CARE EDUCATION/TRAINING PROGRAM

## 2024-03-08 PROCEDURE — 85025 COMPLETE CBC W/AUTO DIFF WBC: CPT

## 2024-03-08 PROCEDURE — 36592 COLLECT BLOOD FROM PICC: CPT

## 2024-03-08 PROCEDURE — 250N000011 HC RX IP 250 OP 636

## 2024-03-08 PROCEDURE — 250N000011 HC RX IP 250 OP 636: Mod: JZ

## 2024-03-08 PROCEDURE — 80053 COMPREHEN METABOLIC PANEL: CPT

## 2024-03-08 PROCEDURE — 120N000002 HC R&B MED SURG/OB UMMC

## 2024-03-08 PROCEDURE — 250N000013 HC RX MED GY IP 250 OP 250 PS 637: Performed by: STUDENT IN AN ORGANIZED HEALTH CARE EDUCATION/TRAINING PROGRAM

## 2024-03-08 RX ORDER — ASPIRIN 81 MG/1
81 TABLET, CHEWABLE ORAL DAILY
Qty: 90 TABLET | Refills: 3 | Status: SHIPPED | OUTPATIENT
Start: 2024-03-08 | End: 2024-07-02

## 2024-03-08 RX ORDER — ERTAPENEM 1 G/1
1 INJECTION, POWDER, LYOPHILIZED, FOR SOLUTION INTRAMUSCULAR; INTRAVENOUS EVERY 24 HOURS
Status: DISCONTINUED | OUTPATIENT
Start: 2024-03-09 | End: 2024-03-09 | Stop reason: HOSPADM

## 2024-03-08 RX ORDER — URSODIOL 250 MG/1
250 TABLET, FILM COATED ORAL 2 TIMES DAILY
Qty: 180 TABLET | Refills: 3 | Status: SHIPPED | OUTPATIENT
Start: 2024-03-08

## 2024-03-08 RX ORDER — PREDNISONE 5 MG/1
5 TABLET ORAL DAILY
Qty: 90 TABLET | Refills: 1 | Status: SHIPPED | OUTPATIENT
Start: 2024-03-08

## 2024-03-08 RX ORDER — METHOCARBAMOL 500 MG/1
500 TABLET, FILM COATED ORAL 4 TIMES DAILY PRN
Qty: 40 TABLET | Refills: 0 | Status: SHIPPED | OUTPATIENT
Start: 2024-03-08 | End: 2024-03-18

## 2024-03-08 RX ORDER — ERTAPENEM 1 G/1
1 INJECTION, POWDER, LYOPHILIZED, FOR SOLUTION INTRAMUSCULAR; INTRAVENOUS EVERY 24 HOURS
Status: ACTIVE
Start: 2024-03-09 | End: 2024-03-14

## 2024-03-08 RX ORDER — ERTAPENEM 1 G/1
1 INJECTION, POWDER, LYOPHILIZED, FOR SOLUTION INTRAMUSCULAR; INTRAVENOUS EVERY 24 HOURS
Status: CANCELLED
Start: 2024-03-09 | End: 2024-03-14

## 2024-03-08 RX ORDER — MYCOPHENOLATE MOFETIL 250 MG/1
500 CAPSULE ORAL 2 TIMES DAILY
Qty: 90 CAPSULE | Refills: 3 | Status: SHIPPED | OUTPATIENT
Start: 2024-03-08 | End: 2024-09-20

## 2024-03-08 RX ORDER — METOPROLOL TARTRATE 25 MG/1
12.5 TABLET, FILM COATED ORAL 2 TIMES DAILY
Qty: 90 TABLET | Refills: 3 | Status: SHIPPED | OUTPATIENT
Start: 2024-03-08 | End: 2024-04-18

## 2024-03-08 RX ORDER — VITAMIN B COMPLEX
50 TABLET ORAL DAILY
Qty: 180 TABLET | Refills: 3 | Status: SHIPPED | OUTPATIENT
Start: 2024-03-08

## 2024-03-08 RX ADMIN — INSULIN ASPART 1 UNITS: 100 INJECTION, SOLUTION INTRAVENOUS; SUBCUTANEOUS at 16:59

## 2024-03-08 RX ADMIN — METHOCARBAMOL 500 MG: 500 TABLET ORAL at 19:11

## 2024-03-08 RX ADMIN — MYCOPHENOLATE MOFETIL 500 MG: 250 CAPSULE ORAL at 19:10

## 2024-03-08 RX ADMIN — METFORMIN ER 500 MG 1000 MG: 500 TABLET ORAL at 08:41

## 2024-03-08 RX ADMIN — METHOCARBAMOL 500 MG: 500 TABLET ORAL at 08:41

## 2024-03-08 RX ADMIN — ATORVASTATIN CALCIUM 40 MG: 40 TABLET, FILM COATED ORAL at 08:40

## 2024-03-08 RX ADMIN — Medication 5 ML: at 19:11

## 2024-03-08 RX ADMIN — Medication 12.5 MG: at 19:11

## 2024-03-08 RX ADMIN — ERTAPENEM SODIUM 1 G: 1 INJECTION, POWDER, LYOPHILIZED, FOR SOLUTION INTRAMUSCULAR; INTRAVENOUS at 01:41

## 2024-03-08 RX ADMIN — METHOCARBAMOL 500 MG: 500 TABLET ORAL at 16:06

## 2024-03-08 RX ADMIN — Medication 50 MCG: at 08:41

## 2024-03-08 RX ADMIN — METHOCARBAMOL 500 MG: 500 TABLET ORAL at 12:34

## 2024-03-08 RX ADMIN — MYCOPHENOLATE MOFETIL 500 MG: 250 CAPSULE ORAL at 08:41

## 2024-03-08 RX ADMIN — LEVOTHYROXINE SODIUM 150 MCG: 0.05 TABLET ORAL at 08:40

## 2024-03-08 RX ADMIN — METFORMIN ER 500 MG 1000 MG: 500 TABLET ORAL at 17:03

## 2024-03-08 RX ADMIN — ASPIRIN 81 MG CHEWABLE TABLET 81 MG: 81 TABLET CHEWABLE at 08:40

## 2024-03-08 RX ADMIN — HYDRALAZINE HYDROCHLORIDE 25 MG: 25 TABLET, FILM COATED ORAL at 19:11

## 2024-03-08 RX ADMIN — INSULIN ASPART 1 UNITS: 100 INJECTION, SOLUTION INTRAVENOUS; SUBCUTANEOUS at 12:34

## 2024-03-08 RX ADMIN — PREDNISONE 5 MG: 5 TABLET ORAL at 08:41

## 2024-03-08 RX ADMIN — Medication 12.5 MG: at 08:41

## 2024-03-08 RX ADMIN — HYDRALAZINE HYDROCHLORIDE 25 MG: 25 TABLET, FILM COATED ORAL at 08:41

## 2024-03-08 RX ADMIN — AMLODIPINE BESYLATE 10 MG: 10 TABLET ORAL at 08:40

## 2024-03-08 RX ADMIN — URSODIOL 250 MG: 250 TABLET, FILM COATED ORAL at 08:41

## 2024-03-08 RX ADMIN — URSODIOL 250 MG: 250 TABLET, FILM COATED ORAL at 19:10

## 2024-03-08 ASSESSMENT — ACTIVITIES OF DAILY LIVING (ADL)
ADLS_ACUITY_SCORE: 20
ADLS_ACUITY_SCORE: 21
ADLS_ACUITY_SCORE: 20
ADLS_ACUITY_SCORE: 21
ADLS_ACUITY_SCORE: 21
ADLS_ACUITY_SCORE: 20
ADLS_ACUITY_SCORE: 21
ADLS_ACUITY_SCORE: 21
ADLS_ACUITY_SCORE: 20
ADLS_ACUITY_SCORE: 21
ADLS_ACUITY_SCORE: 21
ADLS_ACUITY_SCORE: 20
ADLS_ACUITY_SCORE: 21
ADLS_ACUITY_SCORE: 21

## 2024-03-08 NOTE — PROGRESS NOTES
Resident/Fellow Attestation   I, Vani Stapleton MD, was present with the medical/IRVIN student who participated in the service and in the documentation of the note.  I have verified the history and personally performed the physical exam and medical decision making.  I agree with the assessment and plan of care as documented in the note.        Vani Stapleton MD  PGY3  Date of Service (when I saw the patient): 03/08/24      [facility]    Progress Note - Medicine Service, [facility] TEAM 4       Date of Admission:  2/28/2024    Assessment & Plan   Loy is a 69 y/o male with pmhx notable for alcohol-related cirrhosis c/b HCC s/p DDLT (2018), prostate cancer s/p RALP (1/2020), CKD, hypothyroidism, atrial fibrillation, hypertension & steroid/immunosuppression induced diabetes admitted for acute pyelonephritis & community acquired pneumonia      Today:  - Continue IV ertapenem (14 day course); no PO options to complete course based on sensitivities  - Re-timed IV ertapenem to 4AM 3/8/24, and subsequent dosing upon discharge will be in AM  - Pending final AFB sputum result (3/6/24 collected at 1857)  - Per hepatology, discharge with  mg BID indefinitely     Acute pyelonephritis d/t ESBL E.coli  Hx of hemorrhagic cystitis with normal cystoscopy (2021)  Upon admission, endorsed dysuria, bilateral flank pain, fevers, nausea/vomiting, reduced appetite. Was recently in [place] when symptoms started ~2 weeks ago. Prescribed levofloxacin, did not complete course and felt that symptoms have not improved. CT a/p with findings c/w bilateral pyelonephritis. Hx of prior UTI in 3/2023, grew panS E. Coli. Urine culture from admission with ESBL E.coli (sensitive to bactrim, resistant to flouroquinolones). Given pyelonephritis, only treatment option was IV ertapenem (started 3/1/24) with planned 14 day course. Flank pain had resolved until 3/6/24 when pt endorsed left flank  pain without dysuria with some CVA tenderness, but renal ultrasound was normal. Flank pain did resolve with trial of robaxin.   - Continue ertapenem (3/1/24 - present); anticipate 14 day course of antibiotics    > PICC in place   > [facility] Review and ID Care Transition referral ordered   > Will need weekly CBC w/ diff & CMP (cc'd to Dr. Capone)  - Methocarbamol 500 mg QID for flank pain    Hemoptysis  Bilateral GGO  Community acquired pneumonia, treated w/ azithromycin and ceftriaxone  Hx of latent TB- treated  Upon admission, endorsed dyspnea, productive cough of yellow sputum. CXR with consolidation noted in RML and R/LLL opacities. Completed course of azithromycin and ceftriaxone. On 2/28/24 starting having streaks of blood in his sputum, however, he did not let anyone know until 3/3/24. Eliquis was started at 2.5 mg BID on 3/1/24 as hemoptysis was not brought up by patient prior to that. CT chest demonstrated bilateral GGO that could be alveolar hemorrhage vs infection. Legionella, strep pneumo, coccidiodes, aspergillus, fungal antibodies, 1,3 beta D glucan, and respiratory panel negative. Given history of latent TB with new-onset hemoptysis, pulmonology and transplant ID were consulted. Bronchoscopy with BAL was performed on 3/5/24.  - Pulmonology consulted  - S/p Bronchoscopy with BAL on 3/5/24  - BAL cell count: 125 nucleated cells (87% monocytes)  - BAL gram stain, KOH, legionella, nocardia, fungal culture, viral, cocci, histo negative  - BAL bacterial culture growing normal luc  - Sputum AFB negative for 2 out 3 collections; awaiting result for 3rd culture   - Pt needs to be AFB x3 negative before he can be discharged (last pending sputum sample is from 3/6 at 1857 as the prior were collected within 8 hours from previous)  - Transplant ID consulted, appreciate cares:  - Maintain airborne precaution until active pulmonary TB can be fully evaluated  - Continue ertapenem 1 g daily as per  below  - Antibiotics  - Azithromycin (2/28/24 - 3/1/24)  - Ceftriaxone (2/28/24 - 2/29/24)  - Ertapenem (3/1/24 - present)  - Hold anticoagulation    Atrial fibrillation (CHADSVASC 3)  Hx of post op afib previously on metoprolol (no longer) . EKG on arrival afib w/ rvr in the setting of acute infection. TTE 2/28/24 with normal LVEF 60-65%, moderate mitral annular calcification with trace insufficiency, and trace TV insufficiency. CHADSVASC 3- Started on apixaban 2.5 mg BID but developed hemoptysis (as per above). Holding for now. Converted to sinus. Suspect AF triggered by infection. Will continue lopressor for now but decrease to 12.5 mg BID from 25 BID given HR in the 60s.  - Continue lopressor to 12.5 mg BID  - Hold eliquis as per above     Hx of cirrhosis c/b HCC s/p DDLT (2018)  Hx of biliary stricture requiring stent, last ERCP 2019  Follows with hepatology, last visit 3/2023. Endorses feeling that his abdomen is distended more than usual. No alcohol use. No tenderness. CT a/p with no ascites.  - Hepatology consulted, appreciate recs  - Continue PTA mycophenolate 500 mg BID (PTA dose 750 mg BID)  - Continue PTA Prednisone 5 mg daily   - Continue PTA Ursodiol 250 mg BID  - Plan for outpatient hepatology f/up  - Per Hepatology discharge with current mycophenolate and prednisone dose going forward; hepatology to send note to transplant coordinator with these changes    Type 2 diabetes  In the setting of steroids/immunosuppression. Follows with endocrinology as an outpatient, recent A1c 9.2%.  - Cont PTA lantus to 28 U QHS  - Cont PTA metformin 1000 mg   - Hold PTA jardiance 10 mg 2/2 UTI  - JOANIE  - Hypoglycemia protocol    Hyponatremia - resolved  - CTM     DEREK - resolved  Non gap acidosis   Creatinine 1.26, not far from baseline of ~0.9. Likely pre-renal. Resolved with IVF.  - Daily BMP     CAD s/p PCI to mLAD  Not on ASA but should be. Started apixaban this admission but had to hold given hemoptysis. Patient  "does not know why he is not on aspirin. Per last cardiology note he was to continue on 81 mg daily. Hx of hematuria and developed hemoptysis during this hospitalization, but no other bleeding events otherwise.  - Continue ASA 81 mg daily  - Continue Atorvastatin 40 mg     Hypertension  - Continue PTA hydralazine BID   - Continue PTA amlodipine 10 mg daily     Hypothyroidism  - Continue PTA levothyroxine 150 mcg     Hx of prostate cancer s/p RALP (2020)  - Follows with Urology as outpatient    Elevated troponin - resolved  Demand ischemia  45-->43 in the setting of afib w/ rvr, infection. No chest pain. EKG with no acute ischemic changes. Suspect demand ischemia.  - CTM        Diet: Regular Diet Adult  Snacks/Supplements Adult: Other; Ensure max with lunch trays; With Meals    DVT Prophylaxis: Pneumatic Compression Devices  Cronin Catheter: Not present  Fluids: None  Lines: PRESENT      PICC 03/06/24 Single Lumen Right Basilic Antibiotic-Site Assessment: WDL    Cardiac Monitoring: None  Code Status: Full Code      Clinically Significant Risk Factors              # Hypoalbuminemia: Lowest albumin = 2.4 g/dL at 3/5/2024  5:54 AM, will monitor as appropriate     # Hypertension: Noted on problem list        # Overweight: Estimated body mass index is 29.91 kg/m  as calculated from the following:    Height as of this encounter: 1.702 m (5' 7\").    Weight as of this encounter: 86.6 kg (191 lb).             Disposition Plan      Expected Discharge Date: 03/08/2024,  3:00 PM    Destination: home with family  Discharge Comments: Dispo: home + home infusion   Plan: ertapenem (3/1 - *) x14d; bronchoscopy (3/5); final sputum Cx; PICC   Progress: tele; Chest CT; Weaned to RA; Pulmo consult for hemoptysis; BAL studies pending        The patient's care was discussed with the Attending Physician, Dr. Zaragoza .    Nahomy Gonzalez  Medicine Service, [facility] TEAM [address] [place] [address]  Securely message with " Martina (more info)  Text page via [facility] Paging/Directory   See signed in provider for up to date coverage information  ______________________________________________________________________    Interval History   NAEO. Pt reports sleeping well overnight and feeling fine with AM. He denies any cough, hemoptysis, or dysuria. His flank pain is very minimal now. He is looking forward to going home soon.     Physical Exam   Vital Signs: Temp: 97.9  F (36.6  C) Temp src: Oral BP: 123/67 Pulse: 58   Resp: 18 SpO2: 95 % O2 Device: None (Room air)    Weight: 191 lbs 0 oz    Constitutional: awake, alert, cooperative, no apparent distress, and appears stated age  Eyes: Lids and lashes normal, sclera clear, conjunctiva normal  Respiratory: No increased work of breathing, good air exchange, clear to auscultation bilaterally, no crackles or wheezing  Cardiovascular: Normal apical impulse, regular rate and rhythm, normal S1 and S2, and no murmur noted  Neuropsychiatric: Normal, calm, and normal eye contact    Data     I have personally reviewed the following data over the past 24 hrs:    8.0  \   11.2 (L)   / 434     142 107 14.0 /  149 (H)   4.1 28 0.82 \     ALT: 19 AST: 18 AP: 249 (H) TBILI: 0.3   ALB: 2.6 (L) TOT PROTEIN: 5.5 (L) LIPASE: N/A       Imaging results reviewed over the past 24 hrs:   No results found for this or any previous visit (from the past 24 hour(s)).

## 2024-03-08 NOTE — PLAN OF CARE
Goal Outcome Evaluation: Ongoing, progressing    Plan of Care Reviewed With: patient, spouse    Overall Patient Progress: no change    Outcome Evaluation: Pt is alert and oriented and able to make needs known. No significant changes. IV abx administered overnight. Slept welll between cares. Continue to monitor.

## 2024-03-08 NOTE — PLAN OF CARE
"[id number]  Goal Outcome Evaluation:    /58 (BP Location: Left arm)   Pulse 99   Temp 97.9  F (36.6  C) (Oral)   Resp 20   Ht 1.702 m (5' 7\")   Wt 86.6 kg (191 lb)   SpO2 95%   BMI 29.91 kg/m           PT noted to be comfortable during this shift, VSS, but did c/o having neck pain PRN tylenol administer. PT BS- 220, schedule insulin administer per MD order. No acute events noted on this shift.                "

## 2024-03-08 NOTE — PROGRESS NOTES
[facility]  Transplant Infectious Disease Progress Note     Patient:  Loy Limon, Date of birth 1955, Medical record number [id number]  Date of Visit:  03/08/2024         Assessment and Recommendations:   Recommendations:  - Await last AFB smear. If negative, can discontinue isolation  - Continue ertapenem 1 g daily IV for total of 14 days from 3/1/2024 - 3/14/2024   -Will need weekly CMP, CBC with differential while on this antibiotic  - He will follow-up with Dr Capone in her outpatient ID clinic in approximately 6 weeks  - Will need a repeat CT chest W/o contrast prior to outpatient appointment, which the primary team should order upon discharge.      Thank you for allowing us to care for Mr. Limon.  Transplant Infectious Disease will sign off today.     Assessment:  1.  Complicated ESBL E. coli with bilateral pyelonephritis  Urine culture 2/20/2024 with growth of ESBL E. coli (R-floroquinolones) and CT AP 2/28/2024 with cortical hypoenhancement of the bilateral kidneys concerning for pyelonephritis.  He unfortunately has no oral options for this ESBL complicated urinary tract infection.  Because of this, will need ertapenem 1 g daily IV x 14 days (3/1/2024 - 3/14/2024)     2.  Pneumonia  Has had adequate treatment for community-acquired pneumonia.  Awaiting last AFB smear before discontinuing isolation    3.  History of latent tuberculosis  + Quant 7/19/2018. Was initially started on [id number]/kg PO x4 months on 8/21/2018 (end date ~12/20/2024) but only completed 2.5 months worth. On ID clinic visit with Dr Clancy on 2/5/2019, was transitioned over to INH 300mg daily (due to rifampin DDI with transplant immunosuppressants) which he took for an additional ~2 months with end date on ~4/9/2019.     Infectious Disease issues include:  - QTc interval: 455 on 2/28/2024  - Bacterial prophylaxis: Ertapenem 1 g daily IV  - Pneumocystis prophylaxis: Not applicable  -  Serostatus & viral prophylaxis: CMV D-/R+; EBV D+/R+   - Fungal prophylaxis: Not applicable  - Isolation status: Good hand hygiene.  - Code status: Full Code     Discussed with ID attending, Dr. Liborio MD and primary team     Lindy Sherman DO, PGY 4  Infectious Disease Fellow  [facility]  Pager: [phone]          Interval History:   No acute events overnight.  On evaluation this a.m., he is accompanied by his wife at bedside.  Denies any fevers, chills, cough, dyspnea on exertion.  States he is ready to go home      Transplants:  12/22/2018 (Liver), Postoperative day:  1903.  Coordinator Jeny Szymanski    Review of Systems:  Negative except for above no acute events overnight.  On evaluation this a.m., he is accompanied by his wife.         Current Medications & Allergies:      amLODIPine  10 mg Oral Daily    aspirin  81 mg Oral Daily    atorvastatin  40 mg Oral Daily    [Held by provider] empagliflozin  10 mg Oral Daily    ertapenem  1 g Intravenous Q24H    heparin lock flush  5-20 mL Intracatheter Q24H    hydrALAZINE  25 mg Oral BID    insulin aspart  1-7 Units Subcutaneous TID AC    insulin aspart  1-5 Units Subcutaneous At Bedtime    insulin glargine  28 Units Subcutaneous At Bedtime    levothyroxine  150 mcg Oral Daily    metFORMIN  1,000 mg Oral BID w/meals    methocarbamol  500 mg Oral 4x Daily    metoprolol tartrate  12.5 mg Oral BID    mycophenolate  500 mg Oral BID IS    polyethylene glycol  17 g Oral Daily    predniSONE  5 mg Oral Daily    sodium chloride (PF)  10-40 mL Intracatheter Q8H    sodium chloride (PF)  3 mL Intracatheter Q8H    ursodiol  250 mg Oral BID    Vitamin D3  50 mcg Oral Daily       Infusions/Drips:      No Known Allergies         Physical Exam:   Patient Vitals for the past 24 hrs:   BP Temp Temp src Pulse Resp SpO2   03/08/24 0549 123/67 97.9  F (36.6  C) Oral 58 18 95 %   03/07/24 2135 137/58 97.9  F (36.6  C) Oral 99 20 95 %   03/07/24 1300 123/83 98.2  F (36.8  " C) Oral 107 16 94 %   03/07/24 1258 123/83 -- -- -- -- --     Ranges for vital signs:  Temp:  [97.9  F (36.6  C)-98.2  F (36.8  C)] 97.9  F (36.6  C)  Pulse:  [] 58  Resp:  [16-20] 18  BP: (123-137)/(58-83) 123/67  SpO2:  [94 %-95 %] 95 %  Vitals:    02/28/24 1957   Weight: 86.6 kg (191 lb)       Physical Examination:  GENERAL:  well-developed, well-nourished, in bed in no acute distress.  HEAD:  Head is normocephalic, atraumatic   EYES:  Eyes have anicteric sclerae without conjunctival injection   ENT:  Oropharynx is moist without exudates or ulcers. Tongue is midline  NECK:  Supple. No cervical lymphadenopathy  LUNGS: End expiratory wheezing in bilateral lower lobes with diminished breath sounds throughout.  CARDIOVASCULAR: Irregularly irregular rhythm but regular rate. No murmur  ABDOMEN:  Normal bowel sounds, soft, nontender.   SKIN:  No acute rashes. Line in place without any surrounding erythema or exudate.  NEUROLOGIC:  Grossly nonfocal. Active x4 extremities         Laboratory Data:     No results found for: \"ACD4\"    Inflammatory Markers    Recent Labs   Lab Test 09/08/23  1253   PSA <0.01       Immune Globulin Studies     Recent Labs   Lab Test 03/24/21  0812   IGG 1,064   IGM 39          Metabolic Studies       Recent Labs   Lab Test 03/08/24  0838 03/08/24  0526 03/07/24  0836 03/07/24  0715 03/04/24  1200 03/04/24  1129 03/02/24  0935 03/02/24  0751 02/28/24  2324 02/28/24  2048 09/08/23  2201 09/08/23  1253 07/19/23  1749 07/19/23  1746 12/24/18  0524 12/23/18  1204   NA  --  142  --  138   < >  --    < > 135   < > 131*   < > 142   < > 139   < >  --    POTASSIUM  --  4.1  --  4.1   < >  --    < > 4.1   < > 4.0   < > 6.0*   < > 5.6*   < >  --    CHLORIDE  --  107  --  106   < >  --    < > 104   < > 98   < > 107  --  107   < >  --    CO2  --  28  --  23   < >  --    < > 21*   < > 20*   < > 25  --  22   < >  --    ANIONGAP  --  7  --  9   < >  --    < > 10   < > 13   < > 10  --  10   < >  -- "    BUN  --  14.0  --  11.0   < >  --    < > 15.6   < > 25.0*   < > 23.9*  --  22.3   < >  --    CR  --  0.82  --  0.80   < >  --    < > 0.92   < > 1.26*   < > 0.98  --  1.00   < >  --    GFRESTIMATED  --  >90  --  >90   < >  --    < > 89   < > 61   < > 83  --  81   < >  --    GLC 92 89   < > 138*   < >  --    < > 178*   < > 180*   < > 217*   < > 147*   < >  --    A1C  --   --   --   --   --   --   --   --   --   --   --   --   --  8.8*   < >  --    YELENA  --  8.1*  --  8.1*   < >  --    < > 8.4*   < > 8.7*   < > 9.3  --  9.1   < >  --    PHOS  --   --   --   --   --   --   --   --   --   --   --  3.4  --   --    < >  --    MAG  --   --   --   --   --   --   --   --   --   --   --   --   --  2.1   < >  --    LACT  --   --   --   --   --   --   --   --   --  1.6  --   --   --   --   --  0.8   PCAL  --   --   --   --   --   --   --  1.62*   < >  --   --   --   --   --   --   --    FGTL  --   --   --   --   --  <31  --   --   --   --   --   --   --   --   --   --     < > = values in this interval not displayed.       Hepatic Studies    Recent Labs   Lab Test 03/08/24  0526 03/07/24  0715 03/06/24  0611 03/05/24  0554 03/04/24  0626   BILITOTAL 0.3 0.3 0.4   < > 0.3   DBIL <0.20 <0.20 <0.20   < > <0.20   ALKPHOS 249* 273* 301*   < > 308*   PROTTOTAL 5.5* 5.6* 6.0*   < > 5.5*   ALBUMIN 2.6* 2.6* 2.6*   < > 2.4*   AST 18 23 26   < > 31   ALT 19 21 27   < > 30   LDH  --   --   --   --  179    < > = values in this interval not displayed.       Pancreatitis testing    Recent Labs   Lab Test 07/19/23  1411 07/10/19  0909 03/04/19  1218 02/18/19  0742 12/23/18  0404 12/22/18  0013 10/24/18  1118   AMYLASE  --   --  44 38 46 67  --    LIPASE  --   --  42* 35* 93  --  55*   TRIG 170*   < >  --   --   --   --   --     < > = values in this interval not displayed.       Gout Labs    No lab results found.    Hematology Studies   Recent Labs   Lab Test 03/08/24  0526 03/07/24  0715 03/06/24  0611 03/05/24  0554 07/21/21  1806  07/07/21  1059 03/12/19  0815 03/04/19  1218 02/04/19  1024 01/31/19  0815   WBC 8.0 8.1 8.7 7.7   < > 7.0   < > 3.6*   < > 4.1   25649  --   --   --   --   --   --   --   --   --  4.1   ANEU  --   --   --   --   --  4.6  --  2.3   < >  --    ANEUTAUTO 5.7 6.0 5.5 5.3   < >  --   --   --   --   --    ALYM  --   --   --   --   --  1.8  --  1.0   < >  --    ALYMPAUTO 1.5 1.3 2.3 1.5   < >  --   --   --   --   --    KM  --   --   --   --   --  0.4  --  0.2   < >  --    AMONOAUTO 0.5 0.5 0.6 0.6   < >  --   --   --   --   --    AEOS  --   --   --   --   --  0.2  --  0.1   < >  --    AEOSAUTO 0.3 0.3 0.3 0.3   < >  --   --   --   --   --    ABSBASO 0.0 0.0 0.0 0.0   < >  --   --   --   --   --    HGB 11.2* 12.1* 12.7* 12.0*   < > 14.0   < > 11.2*   < > 10.2*   35456  --   --   --   --   --   --   --   --   --  10.2*   HCT 36.0* 37.7* 38.2* 37.6*   < > 40.1   < > 34.4*   < > 30.0*    423 482* 448   < > 208   < > 199   < > 234   72930  --   --   --   --   --   --   --   --   --  234    < > = values in this interval not displayed.       Clotting Studies    Recent Labs   Lab Test 02/29/24  1004 03/11/23  0434 04/23/21  0755 12/31/18  0715   INR 1.08 0.92 1.02 1.14   PTT 43*  --   --   --        Iron Testing    Recent Labs   Lab Test 03/08/24  0526 07/19/23  1411 06/14/23  1556 08/21/18  1050 07/19/18  1132   IRON  --   --   --   --  141   FEB  --   --   --   --  208*   IRONSAT  --   --   --   --  68*   MCV 88   < > 86   < > 99   B12  --   --  270   < >  --     < > = values in this interval not displayed.       Arterial Blood Gas Testing    Recent Labs   Lab Test 12/23/18  0404 12/22/18  2126 12/22/18 2010 12/22/18  1914 12/22/18  1700   PH 7.41 7.43 7.41 7.42 7.36   PCO2 41 41 42 39 41   PO2 71* 92 221* 247* 184*   HCO3 26 27 26 25 23   O2PER 40 40 75 75 75        Thyroid Studies     Recent Labs   Lab Test 03/02/24  0751 09/08/23  1253 06/14/23  1556 01/27/23  1341 10/04/22  1101   TSH 5.47* 2.17 4.38* 9.80* 5.43*  "  T4 1.24  --  1.18 1.03 1.54       Urine Studies     Recent Labs   Lab Test 02/28/24 2035 09/08/23  2306 07/19/23  1427 05/09/23  0843 03/20/23  1041   URINEPH 5.5 5.5 6.0 5.5 6.0   NITRITE Negative Negative Negative Negative Negative   LEUKEST Small* Negative Negative Negative Negative   WBCU 49* 1 1 <1 1       CSF testing   No lab results found.    Invalid input(s): \"CADAM\", \"EVPCR\", \"ENTPCR\", \"ENTEROVIRUS\"    Medication levels    Recent Labs   Lab Test 03/23/22  0720 12/27/18  0512 12/26/18  1046   VANCOMYCIN  --   --  15.1   TACROL <1.0*   < >  --     < > = values in this interval not displayed.       Body fluid stats    Recent Labs   Lab Test 03/05/24  1131 12/25/18  0719   FCOL Colorless  --    FAPR Clear  --    FWBC 125  --    FNEU 6  --    FLYM 7  --    FMONO 87  --    GS  --  >10 Squamous epithelial cells/low power field indicates oral contamination. Please   recollect.  *  <25 PMNs/low power field  Moderate  Mixed gram positive luc         Microbiology:  Fungal testing  Recent Labs   Lab Test 03/05/24  1355 03/05/24  1131 03/04/24  1129 03/04/24  1129   FGTL  --   --   --  <31   FGTLI  --   --   --  Negative   PJRDFA Not Detected Not Detected   < >  --    ASPGAI  --  0.14  --  0.05   ASPAG  --  Negative  --   --    ASPGAA  --   --   --  Negative   COFUNG  --   --   --  <1:2   ASPA  --   --   --  <1:8   HISTOMYCF  --   --   --  <1:8   HISTOYEACF  --   --   --  <1:8   FUNBL  --   --   --  0.2    < > = values in this interval not displayed.       Last Culture results   Legionella pneumophila serogroup 1 urinary antigen   Date Value Ref Range Status   02/29/2024 Negative Negative Final     Comment:     Suggests no recent or current infection. Infection due to Legionella cannot be ruled out, since other serogroups and species may cause disease, antigen may not be present in urine in early infection, and the level of antigen present in the urine may be below detectable limits of the test.     Streptococcus " pneumoniae antigen   Date Value Ref Range Status   02/29/2024 Negative Negative Final     Comment:     A negative Streptococcus pneumoniae antigen result does not rule out infection with Streptococcus pneumoniae.     P. jirovecii By PCR   Date Value Ref Range Status   03/05/2024 Not Detected  Final     Comment:     NOT DETECTED - A negative result does not rule out the   presence of PCR inhibitors in the patient specimen or   assay specific nucleic acid in concentrations below the   level of detection by the assay.    This test was developed and its performance characteristics   determined by [facility]. It has not been cleared or   approved by the US Food and Drug Administration. This test   was performed in a CLIA certified laboratory and is   intended for clinical purposes.  Performed By: [facility]  [address] [place] [address]  : Rikki Whyte MD, PhD  CLIA Number: [id number]   03/05/2024 Not Detected  Final     Comment:     NOT DETECTED - A negative result does not rule out the   presence of PCR inhibitors in the patient specimen or   assay specific nucleic acid in concentrations below the   level of detection by the assay.    This test was developed and its performance characteristics   determined by [facility]. It has not been cleared or   approved by the US Food and Drug Administration. This test   was performed in a CLIA certified laboratory and is   intended for clinical purposes.  Performed By: [facility] [email or web address]  [address] [place] [address]  : Rikki Whyte MD, PhD  CLIA Number: [id number]     Culture   Date Value Ref Range Status   03/05/2024 Culture negative, monitoring continues  Preliminary   03/05/2024 No growth after 2 days  Preliminary   03/05/2024 No growth after 2 days  Preliminary   03/05/2024 1+ Normal luc  Final   02/29/2024 3+ Normal luc  Final   02/28/2024 No Growth  Final   02/28/2024 No  "Growth  Final   02/28/2024 50,000-100,000 CFU/mL Escherichia coli ESBL (A)  Final   03/11/2023 >100,000 CFU/mL Escherichia coli (A)  Final     Culture Micro   Date Value Ref Range Status   02/18/2020   Final    <10,000 colonies/mL  urogenital luc  Susceptibility testing not routinely done     12/16/2019 <10,000 colonies/mL  urogenital luc    Final   12/16/2019 Susceptibility testing not routinely done  Final   10/10/2019 No growth  Final   12/25/2018 (A)  Final    Canceled, Test credited  >10 Squamous epithelial cells/low power field indicates oral contamination. Please   recollect.     12/25/2018   Final    Notification of test cancellation was given to  Tahira Reis RN at 1000 12.25.18     12/24/2018 No growth  Final   12/24/2018 No growth  Final   12/22/2018 No anaerobes isolated  Final   12/22/2018 No growth  Final   12/22/2018 Culture negative after 30 days  Final           Last checks of Clostridioides difficile testing  No lab results found.    Infection Studies to assess Diarrhea No lab results found.    Invalid input(s): \"BIDYD\"    Virology:  Coronavirus-19 testing    Recent Labs   Lab Test 02/28/24  2039 10/11/21  0806 10/01/21  1141   [id number] Negative Negative Negative       Respiratory virus (non-coronavirus-19) testing    Recent Labs   Lab Test 03/05/24  1131 02/29/24  1000 02/28/24 2039   IFLUA Not Detected   < >  --    INFZA  --   --  Negative   FLUAH1 Not Detected   < >  --    QE7715 Not Detected   < >  --    FLUAH3 Not Detected   < >  --    IFLUB Not Detected   < >  --    INFZB  --   --  Negative   PIV1 Not Detected   < >  --    PIV2 Not Detected   < >  --    PIV3 Not Detected   < >  --    PIV4 Not Detected   < >  --    IRSV  --   --  Negative   RSVA Not Detected   < >  --    RSVB Not Detected   < >  --    HMPV Not Detected   < >  --    RHINEV Not Detected   < >  --    ADENOV Not Detected   < >  --    CORONA Not Detected   < >  --     < > = values in this interval not displayed.       CMV " "viral loads  No lab results found.    CMV resistance testing  No lab results found.    No results found for: \"H6RES\"    No results found for: \"EBVRESINST\", \"14125\", \"EBQTC\", \"EBRES\", \"94306\", \"67642\"    BK Virus Testing   No lab results found.    Parvovirus Testing  No lab results found.    Invalid input(s): \"PRVRES\"    Adenovirus Testing  No lab results found.    Invalid input(s): \"ADENAB\", \"ADENOVIRUS\", \"ADQT\"    Imaging:  Recent Results (from the past 48 hour(s))   US Renal Complete Non-Vascular    Narrative    EXAMINATION: US RENAL COMPLETE NON-VASCULAR, 3/6/2024 1:37 PM     COMPARISON: CT abdomen and pelvis 2/28/2024.    HISTORY: 68-year-old male with bilateral pyelonephritis now with left  flank pain, concern for abscess or complications.    TECHNIQUE: The kidneys and bladder were scanned in the standard  fashion with specialized ultrasound transducer(s) using both gray  scale and limited color/spectral Doppler techniques.    FINDINGS:    Right kidney: Measures 12.8 cm in length. Parenchyma is of normal  thickness and echogenicity. No focal mass. No hydronephrosis.    Left kidney: Measures 11.2 cm in length. Parenchyma is of normal  thickness and echogenicity. No focal mass. No hydronephrosis.     Bladder: Mildly distended.      Impression    IMPRESSION:  No intra or perirenal fluid collections. Normal renal ultrasound.    I have personally reviewed the examination and initial interpretation  and I agree with the findings.    ROSHAN ALEXANDRE MD         SYSTEM ID:  [id number]   XR Chest Port 1 View    Narrative    Exam: Chest radiograph 3/6/2024 6:40 PM    History: eval PICC    Comparison: X-ray 3/2/2024, CT 3/3/2024.     Findings: Portable AP view of the chest. Right upper extremity PICC  with tip projecting over the right atrium. Cardiomediastinal contours  stable. Similar perihilar predominant mixed interstitial and airspace  opacities. Blunting of the right costophrenic angle. The left  costophrenic angle is " collimated off the field-of-view. No  pneumothorax. Osseous structures and upper abdomen are unremarkable.      Impression    Impression:   1. Right upper extremity PICC with tip projecting over the right  atrium..  2. Pulmonary findings better characterized on CT 3/3/2024 concerning  for infectious or inflammatory etiology.    I have personally reviewed the examination and initial interpretation  and I agree with the findings.    ROSHAN ALEXANDRE MD         SYSTEM ID:  [id number]   XR Chest Port 1 View    Narrative    Exam: XR CHEST PORT 1 VIEW, 3/6/2024 7:39 PM    Indication: eval PICC    Comparison: Chest radiograph 3/6/2024    Findings:   Portable AP semiupright view of the chest was obtained. Right  approximately PICC tip terminates in the mid SVC. Normal  cardiomediastinal silhouette. Mild blunting of the costophrenic  angles. Trace right pleural effusion. No appreciable pneumothorax.  Perihilar/basilar opacities, similar compared to earlier today.      Impression    Impression: Right upper extremity PICC tip terminates in the mid SVC.  Otherwise stable exam compared earlier today.    I have personally reviewed the examination and initial interpretation  and I agree with the findings.    ROSHAN ALEXANDRE MD         SYSTEM ID:  [id number]

## 2024-03-08 NOTE — PROGRESS NOTES
CLINICAL NUTRITION SERVICES    Reviewed nutrition risk factors due to LOS. Pt is tolerating a regular diet, eating well per nursing documentation, (100% meals recently). Patient is receiving high protein nutrition shakes with lunch trays. No nutrition issues identified at this time. RD will defer assessment at this time and will follow per protocol.     Cynthia De Paz RD/ISABEL Mack

## 2024-03-08 NOTE — PLAN OF CARE
"/65 (BP Location: Left arm, Patient Position: Semi-Correa's, Cuff Size: Adult Regular)   Pulse 65   Temp 98.2  F (36.8  C) (Oral)   Resp 18   Ht 1.702 m (5' 7\")   Wt 86.6 kg (191 lb)   SpO2 96%   BMI 29.91 kg/m       Pt is alert and oriented x 4, Ghanaian speaking  utilized. vitally stable on room air denies SOB or chest pain. Calm and cooperative with care. Regular diet denies nausea, BG ACHS. Voiding spontaneously via urinal, no BM this shift. R single lumen PICC SL .Mild flank pain managed with scheduled Robaxin. SBA with activity.Monitor and cont  with POC.          "

## 2024-03-08 NOTE — PROGRESS NOTES
Care Management Follow Up    Length of Stay (days): 9  Expected Discharge Date: 03/09/2024  Concerns to be Addressed: discharge planning     Patient plan of care discussed at interdisciplinary rounds: Yes  Anticipated Discharge Disposition: Home  Anticipated Discharge Services: Home Infusion  Anticipated Discharge DME: None  Patient/family educated on Medicare website which has current facility and service quality ratings:  n/a  Education Provided on the Discharge Plan:  yes  Patient/Family in Agreement with the Plan:  yes    Referrals Placed by CM/SW:      [facility]  [address] [place] [address]  Phone: [phone]  Fax: [phone]  IV Abx (thru 3/14): ertapenem 1g - daily    Private pay costs discussed: Not applicable    Additional Information:  Pt near medically ready for discharge to home with ongoing IV Abx needs. [facility] accepted services, which liaison team successfully contact pt for review of services, including delivery and Rx storage. Family available for transportation. Delay r/t pending AFB smear, to definitively rule out TB. [facility] to continue to follow for safe discharge planning; weekend help list to be requested.      Herbie Fierro RN BSN  7C [facility]  Phone: [phone]  Pager: [phone]    For Weekend & Holiday on call RN Care Coordinator:  (Tasks: Home care, home infusion, medical equipment, transportation, IMM & CACERES forms, etc.)  [facility] (0800 - 1630) Saturday and Sunday    Units: 5A, 5B, & 5C: [phone]    Units: 6B, 6C, 6D: [phone]    Units: 7A, 7B, 7C, 7D: [phone]    Units: 6A/ICU : [phone]    [facility] ([id number]) Saturday and Sunday    Units: 6 Med/Surg, 8A, 10 ICU, & Pediatric Units: [phone]    Units: 5 Ortho, 5Med/Surg & WB ED: [phone]

## 2024-03-09 VITALS
SYSTOLIC BLOOD PRESSURE: 153 MMHG | DIASTOLIC BLOOD PRESSURE: 69 MMHG | TEMPERATURE: 98.2 F | WEIGHT: 191 LBS | RESPIRATION RATE: 16 BRPM | OXYGEN SATURATION: 93 % | HEART RATE: 93 BPM | HEIGHT: 67 IN | BODY MASS INDEX: 29.98 KG/M2

## 2024-03-09 LAB
CREAT SERPL-MCNC: 0.77 MG/DL (ref 0.67–1.17)
EGFRCR SERPLBLD CKD-EPI 2021: >90 ML/MIN/1.73M2
GLUCOSE BLDC GLUCOMTR-MCNC: 77 MG/DL (ref 70–99)
PLATELET # BLD AUTO: 497 10E3/UL (ref 150–450)

## 2024-03-09 PROCEDURE — 250N000011 HC RX IP 250 OP 636: Mod: JZ

## 2024-03-09 PROCEDURE — 250N000013 HC RX MED GY IP 250 OP 250 PS 637

## 2024-03-09 PROCEDURE — 250N000012 HC RX MED GY IP 250 OP 636 PS 637: Performed by: STUDENT IN AN ORGANIZED HEALTH CARE EDUCATION/TRAINING PROGRAM

## 2024-03-09 PROCEDURE — 36415 COLL VENOUS BLD VENIPUNCTURE: CPT

## 2024-03-09 PROCEDURE — 85049 AUTOMATED PLATELET COUNT: CPT

## 2024-03-09 PROCEDURE — 250N000013 HC RX MED GY IP 250 OP 250 PS 637: Performed by: STUDENT IN AN ORGANIZED HEALTH CARE EDUCATION/TRAINING PROGRAM

## 2024-03-09 PROCEDURE — 82565 ASSAY OF CREATININE: CPT

## 2024-03-09 PROCEDURE — 99238 HOSP IP/OBS DSCHRG MGMT 30/<: CPT | Performed by: STUDENT IN AN ORGANIZED HEALTH CARE EDUCATION/TRAINING PROGRAM

## 2024-03-09 RX ORDER — BLOOD PRESSURE TEST KIT
KIT MISCELLANEOUS
Qty: 200 EACH | Refills: 1 | Status: SHIPPED | OUTPATIENT
Start: 2024-03-09

## 2024-03-09 RX ADMIN — ATORVASTATIN CALCIUM 40 MG: 40 TABLET, FILM COATED ORAL at 08:16

## 2024-03-09 RX ADMIN — POLYETHYLENE GLYCOL 3350 17 G: 17 POWDER, FOR SOLUTION ORAL at 08:16

## 2024-03-09 RX ADMIN — METHOCARBAMOL 500 MG: 500 TABLET ORAL at 08:16

## 2024-03-09 RX ADMIN — HYDRALAZINE HYDROCHLORIDE 25 MG: 25 TABLET, FILM COATED ORAL at 08:17

## 2024-03-09 RX ADMIN — Medication 12.5 MG: at 08:16

## 2024-03-09 RX ADMIN — ERTAPENEM SODIUM 1 G: 1 INJECTION, POWDER, LYOPHILIZED, FOR SOLUTION INTRAMUSCULAR; INTRAVENOUS at 04:55

## 2024-03-09 RX ADMIN — PREDNISONE 5 MG: 5 TABLET ORAL at 08:17

## 2024-03-09 RX ADMIN — ASPIRIN 81 MG CHEWABLE TABLET 81 MG: 81 TABLET CHEWABLE at 08:16

## 2024-03-09 RX ADMIN — METFORMIN ER 500 MG 1000 MG: 500 TABLET ORAL at 08:16

## 2024-03-09 RX ADMIN — MYCOPHENOLATE MOFETIL 500 MG: 250 CAPSULE ORAL at 08:17

## 2024-03-09 RX ADMIN — LEVOTHYROXINE SODIUM 150 MCG: 0.05 TABLET ORAL at 08:16

## 2024-03-09 RX ADMIN — AMLODIPINE BESYLATE 10 MG: 10 TABLET ORAL at 08:16

## 2024-03-09 ASSESSMENT — ACTIVITIES OF DAILY LIVING (ADL)
ADLS_ACUITY_SCORE: 20

## 2024-03-09 NOTE — DISCHARGE SUMMARY
"[facility]  Discharge Summary - Medicine & Pediatrics       Date of Admission:  2/28/2024  Date of Discharge:  3/9/2024  Discharging Provider: Alessandra Zaragoza MD  Discharge Service: Medicine Service, PATRICIO TEAM 4    Discharge Diagnoses   Acute pyelonephritis d/t ESBL E.coli  Community acquired pneumonia, treated w/ azithromycin and ceftriaxone  Atrial fibrillation (CHADSVASC 3)  CAD s/p PCI to mLAD  Hypertension  Type 2 diabetes  Hypothyroidism  Acute kidney injury  Non anion gap metabolic acidosis   Demand ischemia  Hx of hemorrhagic cystitis with normal cystoscopy (2021)  Hx of latent TB- treated  Hx of cirrhosis c/b HCC s/p DDLT (2018)  Hx of biliary stricture requiring stent, last ERCP 2019  Hx of prostate cancer s/p RALP (2020)    Clinically Significant Risk Factors     # Overweight: Estimated body mass index is 29.91 kg/m  as calculated from the following:    Height as of this encounter: 1.702 m (5' 7\").    Weight as of this encounter: 86.6 kg (191 lb).       Follow-ups Needed After Discharge   Follow-up Appointments     Adult [facility] Follow-up and recommended labs and tests      Follow up with primary care provider, Jaswinder Anne, within 7 days   to evaluate medication change, to evaluate treatment change, and for   hospital follow- up.  The following labs/tests are recommended: CBC and   CMP (3/11/24).      Appointments on [place] and/or [facility] (with [facility]   provider or service). Call [phone] if you haven't heard regarding   these appointments within 7 days of discharge.            Unresulted Labs Ordered in the Past 30 Days of this Admission       Date and Time Order Name Status Description    3/6/2024  9:00 AM Acid-Fast Bacilli Culture and Stain In process     3/6/2024  7:50 AM Acid-Fast Bacilli Culture and Stain In process     3/5/2024  1:30 PM Acid-Fast Bacilli Culture and Stain In process     3/5/2024 11:38 AM Fungal or Yeast " Culture Routine Preliminary     3/5/2024 11:38 AM Nocardia species culture Preliminary     3/5/2024 11:38 AM Legionella species culture Preliminary     3/5/2024 11:38 AM Acid-Fast Bacilli Culture and Stain In process     3/4/2024  1:12 PM Acid-Fast Bacilli Culture and Stain In process         These results will be followed up by PCP    Discharge Disposition   Discharged to home  Condition at discharge: Stable    Hospital Course   Loy Limon is a 67 y/o male with pmhx notable for alcohol-related cirrhosis c/b HCC s/p DDLT (2018), prostate cancer s/p RALP (1/2020), CKD, hypothyroidism, atrial fibrillation, hypertension & steroid/immunosuppression induced diabetes admitted for acute pyelonephritis & community acquired pneumonia. The following problems were addressed during his hospitalization:    Acute pyelonephritis d/t ESBL E.coli  Hx of hemorrhagic cystitis with normal cystoscopy (2021)  Upon admission, endorsed dysuria, bilateral flank pain, fevers, nausea/vomiting, and reduced appetite. CT a/p with findings c/w bilateral pyelonephritis. Urine cultures grew ESBL E.coli (sensitive to bactrim, resistant to flouroquinolones). Given pyelonephritis, only treatment option was IV ertapenem (started 3/1/24) with planned 14 day course. Flank pain had resolved, until 3/6/24 when pt endorsed left flank pain without dysuria with some CVA tenderness, but renal ultrasound was normal. Flank pain did resolve with trial of robaxin.   - Transplant ID:  - Continue ertapenem (started 3/1/24); anticipate 14 day course (end 3/14/24)  - PICC in place  - Will need weekly CBC w/ diff & CMP (cc'd to Dr. Capone)  - Methocarbamol 500 mg QID for musculoskeletal flank pain     Hemoptysis with bilateral GGO  Community acquired pneumonia, treated w/ azithromycin and ceftriaxone  Hx of latent TB - treated  Upon admission, endorsed dyspnea, productive cough of yellow sputum. CXR with consolidation noted in RML and R/LLL opacities. Completed  course of azithromycin (2/28/24 - 3/1/24) and ceftriaxone (2/28/24 - 2/29/24). On 2/28/24 starting having streaks of blood in his sputum, however, he did not let anyone know until 3/3/24. Apixaban was started at 2.5 mg BID on 3/1/24 as hemoptysis was not brought up by patient prior to that. CT chest demonstrated bilateral GGO that could be alveolar hemorrhage vs infection. Given history of latent TB with new-onset hemoptysis, pulmonology and transplant ID were consulted.  - Pulmonology:  - S/p Bronchoscopy with BAL on 3/5/24 with normal findings  - Transplant ID:    - Sputum AFB smear were negative x3, collected ~8 hours apart, ruling out active TB  - Hold anticoagulation     Atrial fibrillation (CHADSVASC 3)  Hx of post op afib previously on metoprolol (no longer). EKG on admission showed Afib w/ RVR in the setting of acute infection. TTE (2/28/24) with normal LVEF 60-65%, moderate mitral annular calcification with trace insufficiency, and trace TV insufficiency. CHADSVASC 3, so started on apixaban 2.5 mg BID, but developed hemoptysis (as per above). Converted to sinus with lopressor. Suspect atrial fibrillation triggered by infection.  - Continue lopressor 12.5 mg BID  - Hold apixaban 2.5 BID due to hemoptysis. Can readdress need for anticoagulation outpatient once he has recovered from this acute episode.    Demand ischemia  Elevated troponin 45-->43 in the setting of afib w/ rvr and infection. No chest pain. EKG with no acute ischemic changes.     Hx of cirrhosis c/b HCC s/p DDLT (2018)  Hx of biliary stricture requiring stent, last ERCP 2019  Follows with hepatology, last visit 3/2023. CT A/P (2/28/24) with no ascites. Hepatology consulted for management of immunosuppressants iso infection.   - Hepatology  - Continue mycophenolate 500 mg BID  - Continue PTA Prednisone 5 mg daily   - Continue PTA Ursodiol 250 mg BID  - Plan for outpatient hepatology f/up  - Hepatology to send note to transplant coordinator with  changes to medication     Type 2 diabetes  In the setting of steroids/immunosuppression. Follows with endocrinology as an outpatient, recent A1c 9.2%.  - Cont PTA lantus to 28 U QHS  - Cont PTA metformin 1000 mg   - Hold PTA jardiance 10 mg 2/2 UTI    DEREK - resolved  Non gap acidosis   Creatinine on admission was 1.26, not far from baseline of ~0.9. DEREK was likely pre-renal and resolved with IVF.     CAD s/p PCI to mLAD  Not on ASA but should be. Started apixaban this admission, but held given hemoptysis. Patient does not know why he is not on aspirin. Per last cardiology note he was to continue on 81 mg daily. Hx of hematuria and developed hemoptysis during this hospitalization, but no other bleeding events otherwise.  - Continue ASA 81 mg daily  - Continue PTA atorvastatin 40 mg     Hypertension  - Continue PTA hydralazine BID   - Continue PTA amlodipine 10 mg daily     Hypothyroidism  - Continue PTA levothyroxine 150 mcg     Hx of prostate cancer s/p RALP (2020)  - Follows with Urology as outpatient    Consultations This Hospital Stay   GI HEPATOLOGY ADULT IP CONSULT  CARE MANAGEMENT / SOCIAL WORK IP CONSULT  PULMONARY GENERAL ADULT IP CONSULT  INFECTIOUS DISEASE TRANSPLANT SOT ADULT IP CONSULT  [facility] SERVICES IP CONSULT  VASCULAR ACCESS FOR PICC PLACEMENT ADULT IP CONSULT  [facility] PHARMACY IP CONSULT    Code Status   Full Code      Alessandra Zaragoza MD  [address] MED SURG  [address] [facility] [address]  Phone: [phone]  ______________________________________________________________________    Physical Exam   Vital Signs: Temp: 98.2  F (36.8  C) Temp src: Oral BP: (!) 153/69 Pulse: 93   Resp: 16 SpO2: 93 % O2 Device: None (Room air)    Weight: 191 lbs 0 oz  Constitutional: awake, alert, cooperative, no apparent distress, and appears stated age  Eyes: Lids and lashes normal, sclera clear, conjunctiva normal  ENT: Normocephalic, without obvious abnormality,  atraumatic  Respiratory: No increased work of breathing, good air exchange, clear to auscultation bilaterally, no crackles or wheezing  Cardiovascular: Regular rate and rhythm, normal S1 and S2, and no murmur noted  GI: No scars, normal bowel sounds, soft, non-distended, non-tender      Primary Care Physician   Jaswinder Anne    Discharge Orders      Comprehensive metabolic panel     OPAT enrollment and ID Clinic Referral      Primary Care - Care Coordination Referral      Home Infusion Referral      Reason for your hospital stay    You were hospitalized for an infection of your kidneys. You were treated with intravenous antibiotics and will be discharged with a PICC line to complete the antibiotic course until 3/14/24. Please have labs drawn on Monday 3/11/24 (these are ordered for you) at any [facility]. Follow up with your primary care provider within 7-10 days.     Activity    Your activity upon discharge: activity as tolerated     Adult [facility] Follow-up and recommended labs and tests    Follow up with primary care provider, Jaswinder Anne, within 7 days to evaluate medication change, to evaluate treatment change, and for hospital follow- up.  The following labs/tests are recommended: CBC and CMP (3/11/24).      Appointments on [place] and/or [facility] (with [facility] provider or service). Call [phone] if you haven't heard regarding these appointments within 7 days of discharge.     Diet    Follow this diet upon discharge: Orders Placed This Encounter     Regular diet     CBC with Platelets & Differential    Fax results to ID clinic (Dr. Capoen)       Significant Results and Procedures   Results for orders placed or performed during the hospital encounter of 02/28/24   XR Chest 2 Views    Narrative    EXAM: XR CHEST 2 VIEWS  LOCATION: [facility]  DATE: 2/28/2024    INDICATION: fever  COMPARISON: CT 07/14/2021.      Impression     IMPRESSION: Since 07/14/2021, development of a left lower lobe airspace opacity which could be infectious/inflammatory. Remainder not significantly changed. Bibasilar atelectasis/scarring. Normal heart size and pulmonary vasculature. No pleural effusion.   Upper abdominal surgical clips. Degenerative changes both shoulders.   CT Abdomen Pelvis w Contrast    Narrative    EXAM: CT ABDOMEN PELVIS W CONTRAST  LOCATION: [facility]  DATE: 2/28/2024    INDICATION: fever, nausea vomiting  COMPARISON: CT abdomen pelvis 03/11/2023. MR of the abdomen 04/26/2023.  TECHNIQUE: CT scan of the abdomen and pelvis was performed following injection of IV contrast. Multiplanar reformats were obtained. Dose reduction techniques were used.  CONTRAST: 118ml isovue 370    FINDINGS:   LOWER CHEST: Patchy alveolar consolidation of the included bases of both lower lobes and right middle lobe. Trace pleural effusions, right more than left. Tiny benign calcified granuloma at the base of the left lower lobe.    HEPATOBILIARY: Postcholecystectomy. Normal liver and bile ducts.    PANCREAS: Normal.    SPLEEN: Normal.    ADRENAL GLANDS: Normal.    KIDNEYS/BLADDER: Subtle patchy cortical hypoenhancement of both kidneys suspicious for possible pyelonephritis. Mild edema of retroperitoneal fat near both ureters which are otherwise unremarkable and normal in caliber. No urolithiasis or hydronephrosis.   Bladder grossly unremarkable.    BOWEL: Small periampullary duodenal diverticulum. No bowel obstruction or significant inflammation. Appendix not seen separately. No evidence for appendicitis.    LYMPH NODES: No pathologically enlarged lymph nodes. 14 mm circumscribed benign-appearing cyst in the right retroperitoneum along the right lateral margin of the IVC is unchanged.    VASCULATURE: Moderate aortoiliac atherosclerosis. Coronary artery calcification incompletely imaged. No aneurysm.    PELVIC ORGANS:  Normal.    MUSCULOSKELETAL: Degenerative changes of the spine.      Impression    IMPRESSION:   1.  Pneumonia involving the included bases of the right middle lobe and both lower lobes. Trace pleural effusions.  2.  Possible bilateral pyelonephritis. No abscess. Clinical correlation recommended.   XR Chest Port 1 View    Narrative    Exam: Chest radiograph 3/2/2024 1:28 PM    History: ongoing hypoxia    Comparison: 2/28/2024.     Findings: Portable AP view of the chest. Cardiomediastinal contours  stable. There is patchy opacity in the left lower lung. Trace  costophrenic angle blunting bilaterally. No pneumothorax. Osseous  structures and upper abdomen are unremarkable.      Impression    Impression:   1. Left lower lobe opacity may represent infectious pneumonia.  2. Trace pleural effusions.    I have personally reviewed the examination and initial interpretation  and I agree with the findings.    GLYNN HAYES DO         SYSTEM ID:  [id number]   CT Chest w Contrast    Narrative    EXAMINATION: CT CHEST W CONTRAST, 3/3/2024 12:58 PM    TECHNIQUE:  Helical CT images from the thoracic inlet through the lung  bases were obtained with IV contrast.     COMPARISON: CT chest 7/14/2021    HISTORY: new hemoptysis on DOAC    FINDINGS:    Chest: Thyroid gland is unremarkable. Normal branching of the great  vessels, small amount of calcifications at the origin of the great  vessels. Calcifications of the coronary arteries. Pulmonary arteries  are not significantly dilated. Normal caliber of the ascending aorta.  Trace pericardial effusion. Central filling defect of the SVC likely  representing artifact.  Perihilar bilateral patchy groundglass opacities predominantly in the  lower lobes and lower aspect of the upper lobes. No focal  consolidation. No pneumothorax.    Small right and trace left pleural effusions.    Upper abdomen: Within normal limits    Bones/soft tissues: Degenerative changes in the visualized spine. No  acute  osseous abnormalities or suspicious bony lesions.      Impression    IMPRESSION:   Bilateral perihilar predominant ground glass opacities, this is  nonspecific and may represent alveolar hemorrhage, infectious or  inflammatory etiology. Recommend follow-up to clearance.    I have personally reviewed the examination and initial interpretation  and I agree with the findings.    AGATHA OCONNOR MD         SYSTEM ID:  [id number]   US Renal Complete Non-Vascular    Narrative    EXAMINATION: US RENAL COMPLETE NON-VASCULAR, 3/6/2024 1:37 PM     COMPARISON: CT abdomen and pelvis 2/28/2024.    HISTORY: 68-year-old male with bilateral pyelonephritis now with left  flank pain, concern for abscess or complications.    TECHNIQUE: The kidneys and bladder were scanned in the standard  fashion with specialized ultrasound transducer(s) using both gray  scale and limited color/spectral Doppler techniques.    FINDINGS:    Right kidney: Measures 12.8 cm in length. Parenchyma is of normal  thickness and echogenicity. No focal mass. No hydronephrosis.    Left kidney: Measures 11.2 cm in length. Parenchyma is of normal  thickness and echogenicity. No focal mass. No hydronephrosis.     Bladder: Mildly distended.      Impression    IMPRESSION:  No intra or perirenal fluid collections. Normal renal ultrasound.    I have personally reviewed the examination and initial interpretation  and I agree with the findings.    ROSHAN ALEXANDRE MD         SYSTEM ID:  [id number]   XR Chest Port 1 View    Narrative    Exam: Chest radiograph 3/6/2024 6:40 PM    History: eval PICC    Comparison: X-ray 3/2/2024, CT 3/3/2024.     Findings: Portable AP view of the chest. Right upper extremity PICC  with tip projecting over the right atrium. Cardiomediastinal contours  stable. Similar perihilar predominant mixed interstitial and airspace  opacities. Blunting of the right costophrenic angle. The left  costophrenic angle is collimated off the field-of-view.  No  pneumothorax. Osseous structures and upper abdomen are unremarkable.      Impression    Impression:   1. Right upper extremity PICC with tip projecting over the right  atrium..  2. Pulmonary findings better characterized on CT 3/3/2024 concerning  for infectious or inflammatory etiology.    I have personally reviewed the examination and initial interpretation  and I agree with the findings.    ROSHAN ALEXANDRE MD         SYSTEM ID:  [id number]   XR Chest Port 1 View    Narrative    Exam: XR CHEST PORT 1 VIEW, 3/6/2024 7:39 PM    Indication: eval PICC    Comparison: Chest radiograph 3/6/2024    Findings:   Portable AP semiupright view of the chest was obtained. Right  approximately PICC tip terminates in the mid SVC. Normal  cardiomediastinal silhouette. Mild blunting of the costophrenic  angles. Trace right pleural effusion. No appreciable pneumothorax.  Perihilar/basilar opacities, similar compared to earlier today.      Impression    Impression: Right upper extremity PICC tip terminates in the mid SVC.  Otherwise stable exam compared earlier today.    I have personally reviewed the examination and initial interpretation  and I agree with the findings.    ROSHAN ALEXANDRE MD         SYSTEM ID:  [id number]   Echocardiogram Limited     Value    LVEF  60-65%    Narrative    [id number]  [id number]  [id number]  [id number]^JADE^MICHEL     [facility]  Echocardiography Laboratory  [address] [place] [id number]     Name: DONNA BIGGS  MRN: [id number]  : 1955  Study Date: 2024 08:45 AM  Age: 68 yrs  Gender: Male  Patient Location: [facility]  Reason For Study: Atrial Fibrillation, CHF  Ordering Physician: MICHEL HANSEN  Performed By: Lucille Doran RDCS     BSA: 2.0 m2  Height: 67 in  Weight: 191 lb  HR: 90  BP: 140/72 mmHg  ______________________________________________________________________________  Procedure  Limited Portable Echo Adult. Contrast Optison. Optison (NDC  #0772-9856-16)  given intravenously. Patient was given 5 ml mixture of 3 ml Optison and 6 ml  saline. 4 ml wasted.  ______________________________________________________________________________  Interpretation Summary  Left ventricular function is normal.The ejection fraction is 60-65%.  Global right ventricular function is normal.  The inferior vena cava is normal.     This study was compared with the study from 24. No change in ventricular  function. Rhythm is now atrial fibrillation.  ______________________________________________________________________________  Left Ventricle  Left ventricular function is normal.The ejection fraction is 60-65%.     Right Ventricle  The right ventricle is normal size. Global right ventricular function is  normal.     Mitral Valve  Moderate mitral annular calcification is present. Trace mitral insufficiency  is present.     Tricuspid Valve  The tricuspid valve is normal. Trace tricuspid insufficiency is present.  Estimated pulmonary artery systolic pressure is 34 mmHg plus right atrial  pressure. Pulmonary artery systolic pressure is normal.     Vessels  The inferior vena cava is normal.     Pericardium  No pericardial effusion is present.     Compared to Previous Study  This study was compared with the study from 24 .  ______________________________________________________________________________  Doppler Measurements & Calculations  MV max P.9 mmHg  MV mean P.2 mmHg  MV V2 VTI: 31.5 cm  TR max radha: 300.3 cm/sec  TR max P.1 mmHg     ______________________________________________________________________________  Report approved by: Lavinia Dean 2024 11:31 AM           *Note: Due to a large number of results and/or encounters for the requested time period, some results have not been displayed. A complete set of results can be found in Results Review.       Discharge Medications   Current Discharge Medication List        START taking these  medications    Details   aspirin (ASA) 81 MG chewable tablet Take 1 tablet (81 mg) by mouth daily for 360 days  Qty: 90 tablet, Refills: 3    Associated Diagnoses: Coronary artery disease of native artery of native heart with stable angina pectoris (H24)      ertapenem (INVANZ) 1 GM vial Inject 1 g into the vein every 24 hours for 5 days    Associated Diagnoses: Pyelonephritis, acute; Urinary tract infection without hematuria, site unspecified      methocarbamol (ROBAXIN) 500 MG tablet Take 1 tablet (500 mg) by mouth 4 times daily as needed for muscle spasms  Qty: 40 tablet, Refills: 0    Associated Diagnoses: Right sided abdominal pain      metoprolol tartrate (LOPRESSOR) 25 MG tablet Take 0.5 tablets (12.5 mg) by mouth 2 times daily for 360 days  Qty: 90 tablet, Refills: 3    Associated Diagnoses: Coronary artery disease of native artery of native heart with stable angina pectoris (H24)           CONTINUE these medications which have CHANGED    Details   insulin glargine (LANTUS PEN) 100 UNIT/ML pen Inject 28 units subcutaneous at hs.  Qty: 20 mL, Refills: 3    Comments: If Lantus is not covered by insurance, may substitute Basaglar or Semglee or other insulin glargine product per insurance preference at same dose and frequency.    Associated Diagnoses: DM type 2, goal HbA1c 7%-8% (H)      mycophenolate (GENERIC EQUIVALENT) 250 MG capsule Take 2 capsules (500 mg) by mouth 2 times daily for 90 days  Qty: 90 capsule, Refills: 3    Associated Diagnoses: Liver transplanted (H); Liver replaced by transplant (H)      predniSONE (DELTASONE) 5 MG tablet Take 1 tablet (5 mg) by mouth daily  Qty: 90 tablet, Refills: 1    Associated Diagnoses: Liver transplanted (H)      ursodiol (ACTIGALL) 250 MG tablet Take 1 tablet (250 mg) by mouth 2 times daily  Qty: 180 tablet, Refills: 3    Associated Diagnoses: Liver transplanted (H)      Vitamin D3 (CHOLECALCIFEROL) 25 mcg (1000 units) tablet Take 2 tablets (50 mcg) by mouth  daily  Qty: 180 tablet, Refills: 3    Associated Diagnoses: Vitamin D deficiency           CONTINUE these medications which have NOT CHANGED    Details   amLODIPine (NORVASC) 10 MG tablet Take 1 tablet (10 mg) by mouth daily  Qty: 90 tablet, Refills: 1    Associated Diagnoses: Essential hypertension      blood glucose (NO BRAND SPECIFIED) lancets standard Use to test blood sugar 2 times daily or as directed.  Qty: 50 lancet, Refills: 3    Associated Diagnoses: DM type 2, goal HbA1c 7%-8% (H)      blood glucose (NO BRAND SPECIFIED) test strip Use to test blood sugar 2 times daily or as directed. One touch ultra test strips  Qty: 50 strip, Refills: 5    Associated Diagnoses: DM type 2, goal HbA1c 7%-8% (H)      blood glucose (ONE TOUCH SURESOFT) lancing device Lancing device to be used with lancets.  Qty: 1 each, Refills: 0    Associated Diagnoses: DM type 2, goal HbA1c 7%-8% (H)      blood glucose monitoring (ONE TOUCH ULTRA 2) meter device kit Use to test blood sugar 2 times daily or as directed.  Qty: 1 kit, Refills: 0    Associated Diagnoses: DM type 2, goal HbA1c 7%-8% (H)      blood glucose monitoring (ONE TOUCH ULTRASOFT) lancets Use to test blood sugar 2 times daily.  Qty: 100 each, Refills: 6    Associated Diagnoses: DM type 2, goal HbA1c 7%-8% (H)      insulin pen needle (31G X 5 MM) 31G X 5 MM miscellaneous Use one  pen needles daily or as directed.  Qty: 100 each, Refills: 3    Associated Diagnoses: Type 2 diabetes mellitus without complication, with long-term current use of insulin (H)      atorvastatin (LIPITOR) 40 MG tablet Take 1 tablet (40 mg) by mouth daily  Qty: 90 tablet, Refills: 3    Associated Diagnoses: DM type 2, goal HbA1c 7%-8% (H)      hydrALAZINE (APRESOLINE) 25 MG tablet Take 1 tablet (25 mg) by mouth 2 times daily  Qty: 180 tablet, Refills: 3    Associated Diagnoses: Primary hypertension      levothyroxine (SYNTHROID/LEVOTHROID) 150 MCG tablet Take 1 tablet (150 mcg) by mouth daily  Qty:  90 tablet, Refills: 3    Comments: DOSE INCREASED FROM 135 MCG  MCG/DAY. RECHECK THE LAB IN 2 MONTHS.  Associated Diagnoses: Other specified hypothyroidism      metFORMIN (GLUCOPHAGE XR) 500 MG 24 hr tablet Take 2 tablets (1,000 mg) by mouth 2 times daily (with meals)  Qty: 360 tablet, Refills: 3    Associated Diagnoses: DM type 2, goal HbA1c 7%-8% (H)           STOP taking these medications       empagliflozin (JARDIANCE) 10 MG TABS tablet Comments:   Reason for Stopping:             Allergies   No Known Allergies

## 2024-03-09 NOTE — PLAN OF CARE
Goal Outcome Evaluation:      Plan of Care Reviewed With: patient    Overall Patient Progress: no changeOverall Patient Progress: no change    Outcome Evaluation: A&O, slept through night, IV Invanz given. Denies pain. Hopefully DC today 3/9.

## 2024-03-09 NOTE — PROGRESS NOTES
Care Management Follow Up    Length of Stay (days): 10    Expected Discharge Date: 03/09/2024     Concerns to be Addressed: discharge planning     Patient plan of care discussed at interdisciplinary rounds: Yes    Anticipated Discharge Disposition: Home     Anticipated Discharge Services: None  Anticipated Discharge DME: None    Patient/family educated on Medicare website which has current facility and service quality ratings:  N/A  Education Provided on the Discharge Plan:  N/A  Patient/Family in Agreement with the Plan:  N/A    Referrals Placed by CM/SW:  none  Private pay costs discussed: Not applicable    Additional Information:  Was notified by Tonia Vinson RN that the patient has been unable to measure his blood sugar due to his new meter being broken. There will be a new order placed for diabetic supplies to discharge pharmacy to see what may be covered by his insurance otherwise if pt thinks it is a faulty meter he can always call the company to get a new one. Provided number to discharge pharmacy to call with any concerns.     Updated medical student Nahomy Gonzalez.        Americo Lees, [facility]   [phone]  3/9/2024       Social Work and Care Management Department       SEARCHABLE in [employer] - search SOCIAL WORK       [place] (0800 - 1630) Saturday and Sunday     Units: 4A Vocera, 4C Vocera, & 4E Vocera    Pager: [phone]     Units: 5A 3461-2126 Vocera, 5A 9782-5109 Vocera , BMT SW 1, BMT SW 2, BMT SW 3 & BMT SW 4 Pager: [phone]     Units: 6A Vocera & 6B Vocera  Pager: [phone]     Units: 6C Vocera Pager: [phone]     Units: 7A Vocera & 7B Vocera  Pager: [phone]     Units: 7C Med Surg 7401 thru 7418 and 7C Med Surg 7502 thru 7521  Pager: [phone]     Unit: [place] ED Vocera & [place] Obs Vocera Pager: [phone]      [facility] ([id number]) Saturday and Sunday      Units: 5 Ortho Vocera, 5 Med Surg Vocera & WB ED Vocera Pager:[phone]     Units: 6 Med Surg  [place]ina, 8 Med Surg Vocera, & 10 ICU Vocera Pager: [phone]        After hours Vocera [place] and After Hours Vocera [place]     Saturday & Sunday (1630 - 0000)    Mon-Fri (9524-5304)     [facility] Recognized Holidays  ([id number])    Units: ALL  Pager: [phone]

## 2024-03-09 NOTE — PLAN OF CARE
Goal Outcome Evaluation:      Plan of Care Reviewed With: patient, spouse    Overall Patient Progress: no changeOverall Patient Progress: no change    Outcome Evaluation: A&Ox4. VSS on RA. Denies pain. Void with bedside urinal. Independent in the room. Right SL PICC SL. BG ACHS. Wife at bedside. Patient reported glucose monitor is broken at home after appointment with HCP. Writer spoke to JIN Sebastian. Discharge at 1211 back home. Medications to be  at home pharmacy. Home infusion order placed.

## 2024-03-09 NOTE — PROGRESS NOTES
Care Management Discharge Note    Discharge Date: 03/09/2024       Discharge Disposition: Home    Discharge Services: Home Infusion     Discharge DME: Infusion supplies via [facility]    Discharge Transportation: family will transport home      Private pay costs discussed: Not applicable    Does the patient's insurance plan have a 3 day qualifying hospital stay waiver?  No    PAS Confirmation Code:  n/a   Patient/family educated on Medicare website which has current facility and service quality ratings:  n/a     Education Provided on the Discharge Plan:  Yes  Persons Notified of Discharge Plans: Pt, [facility]  Patient/Family in Agreement with the Plan:      Handoff Referral Completed: Yes    Additional Information:  RNCC notified Pt is medically ready for discharge and will discharge with IV Abx. Pt received daily does of Abx prior to discharge. RNCC notified [facility] liaison. Home infusion order placed. Bedside RN notified of discharge. Family will transport home.    No other discharge needs identified at this time.     Ольга Benavidez, RN  RN[facility] Float   [phone]

## 2024-03-09 NOTE — PLAN OF CARE
Goal Outcome Evaluation:       A&O x 4, VSS on RA. PICC Heparin locked. Reg diet, good appetite. Voids in BSU and up to bathroom independently. Plan to discharge home possibly tomorrow with home infusion for continuation of abx. Calls to make needs known.

## 2024-03-11 ENCOUNTER — PATIENT OUTREACH (OUTPATIENT)
Dept: CARE COORDINATION | Facility: CLINIC | Age: 69
End: 2024-03-11
Payer: COMMERCIAL

## 2024-03-11 NOTE — PROGRESS NOTES
[facility]  Parenteral ANtibiotic Review at Departure from Acute Care Collaborative Note     Patient: Loy Limon  MRN: [id number]  Allergies: Patient has no known allergies.    Current Location: [facility]  OPAT to be provided by: [facility]       Line Type: PICC    Diagnosis/Indications: Pyelonephritis  Organism(s): Ecoli  MRDO? Yes - non-carbapenem-resistant ESBL  Pending Cultures/Microbiological Tests: no      Inpatient ID involved in developing OPAT plan: Yes - discharge OPAT plan has no changes from ID provider, Dr. Alessandra Swann and Dr. Rikki Sherman, ID fellow, OPAT plan charted on 3/8/2024    Outpatient ID Follow-up: ID OPAT Clinic Referral Placed ([facility] [place] [facility] Ph: [phone] and Fax: [phone]) - no appointment needed (Will need ID clinic appointment in 6-8 weeks for evalution of repeat CT for lung infiltrates)  Designated Provider: Dr. Alessandra Swann    Antimicrobial Regimen / Route Anticipated  Duration Start Date Stop /  Reassess Date   Ertapenem 1 gm every 24 hours/IV 14 days 3/1/2024 3/14/2024     Laboratory Tests and Monitoring Frequency: CBC with Diff, SCr (Once given duration of therapy short)      ID Pharmacist Interventions: None                          Janki Samson, PharmD, MPH, BCIDP  Pager: [phone]

## 2024-03-11 NOTE — PROGRESS NOTES
Clinic Care Coordination Contact    Situation: Patient chart reviewed by care coordinator.    Background: Referred by Inpatient RN for Care Transition related to Home Care Discharge on 3/8/24 (Home Infusion- IV Abx through 3/14).     Assessment: [employer] message sent to patient offering CC services.     Plan/Recommendations: RN will await pt's reply via [employer] for further outreach, will call patient if no reply.     ADIA DICKSON RN on 3/11/2024 at 8:53 AM

## 2024-03-12 ENCOUNTER — DOCUMENTATION ONLY (OUTPATIENT)
Dept: INFECTIOUS DISEASES | Facility: CLINIC | Age: 69
End: 2024-03-12

## 2024-03-12 DIAGNOSIS — R04.2 HEMOPTYSIS: Primary | ICD-10-CM

## 2024-03-12 NOTE — PROGRESS NOTES
Hello there,     Can you please schedule a clinic appointment for this patient with Dr Alessandra Capone in approximately 6 weeks. He will need a repeat CT Chest W prior to this clinic appointment.     Thanks,   Lindy Sherman   Infectious Disease Fellow

## 2024-03-13 ENCOUNTER — APPOINTMENT (OUTPATIENT)
Dept: INTERPRETER SERVICES | Facility: CLINIC | Age: 69
End: 2024-03-13
Payer: COMMERCIAL

## 2024-03-13 ENCOUNTER — TELEPHONE (OUTPATIENT)
Dept: INFECTIOUS DISEASES | Facility: CLINIC | Age: 69
End: 2024-03-13
Payer: COMMERCIAL

## 2024-03-13 NOTE — PROGRESS NOTES
Clinic Care Coordination Contact  [facility]: Post-Discharge Note  SITUATION                                                      Admission:    Admission Date: 02/28/24   Reason for Admission: You were hospitalized for an infection of your kidneys. You were treated with intravenous antibiotics and will be discharged with a PICC line to complete the antibiotic course until 3/14/24. Please have labs drawn on Monday 3/11/24 (these are ordered for you) at any [facility]. Follow up with your primary care provider within 7-10 days.  Discharge:   Discharge Date: 03/09/24  Discharge Diagnosis: Discharge Diagnoses  Acute pyelonephritis d/t ESBL E.coli  Community acquired pneumonia, treated w/ azithromycin and ceftriaxone  Atrial fibrillation (CHADSVASC 3)  CAD s/p PCI to mLAD  Hypertension  Type 2 diabetes  Hypothyroidism  Acute kidney injury  Non anion gap metabolic acidosis   Demand ischemia  Hx of hemorrhagic cystitis with normal cystoscopy (2021)  Hx of latent TB- treated  Hx of cirrhosis c/b HCC s/p DDLT (2018)  Hx of biliary stricture requiring stent, last ERCP 2019  Hx of prostate cancer s/p RALP (2020)    BACKGROUND                                                      Per hospital discharge summary and inpatient provider notes:  Hospital Course  Loy Limon is a 67 y/o male with pmhx notable for alcohol-related cirrhosis c/b HCC s/p DDLT (2018), prostate cancer s/p RALP (1/2020), CKD, hypothyroidism, atrial fibrillation, hypertension & steroid/immunosuppression induced diabetes admitted for acute pyelonephritis & community acquired pneumonia. The following problems were addressed during his hospitalization:     Acute pyelonephritis d/t ESBL E.coli  Hx of hemorrhagic cystitis with normal cystoscopy (2021)  Upon admission, endorsed dysuria, bilateral flank pain, fevers, nausea/vomiting, and reduced appetite. CT a/p with findings c/w bilateral pyelonephritis. Urine cultures grew ESBL E.coli (sensitive  to bactrim, resistant to flouroquinolones). Given pyelonephritis, only treatment option was IV ertapenem (started 3/1/24) with planned 14 day course. Flank pain had resolved, until 3/6/24 when pt endorsed left flank pain without dysuria with some CVA tenderness, but renal ultrasound was normal. Flank pain did resolve with trial of robaxin.   - Transplant ID:  - Continue ertapenem (started 3/1/24); anticipate 14 day course (end 3/14/24)  - PICC in place  - Will need weekly CBC w/ diff & CMP (cc'd to Dr. Capone)  - Methocarbamol 500 mg QID for musculoskeletal flank pain     Hemoptysis with bilateral GGO  Community acquired pneumonia, treated w/ azithromycin and ceftriaxone  Hx of latent TB - treated  Upon admission, endorsed dyspnea, productive cough of yellow sputum. CXR with consolidation noted in RML and R/LLL opacities. Completed course of azithromycin (2/28/24 - 3/1/24) and ceftriaxone (2/28/24 - 2/29/24). On 2/28/24 starting having streaks of blood in his sputum, however, he did not let anyone know until 3/3/24. Apixaban was started at 2.5 mg BID on 3/1/24 as hemoptysis was not brought up by patient prior to that. CT chest demonstrated bilateral GGO that could be alveolar hemorrhage vs infection. Given history of latent TB with new-onset hemoptysis, pulmonology and transplant ID were consulted.  - Pulmonology:  - S/p Bronchoscopy with BAL on 3/5/24 with normal findings  - Transplant ID:               - Sputum AFB smear were negative x3, collected ~8 hours apart, ruling out active TB  - Hold anticoagulation     Atrial fibrillation (CHADSVASC 3)  Hx of post op afib previously on metoprolol (no longer). EKG on admission showed Afib w/ RVR in the setting of acute infection. TTE (2/28/24) with normal LVEF 60-65%, moderate mitral annular calcification with trace insufficiency, and trace TV insufficiency. CHADSVASC 3, so started on apixaban 2.5 mg BID, but developed hemoptysis (as per above). Converted to sinus with  lopressor. Suspect atrial fibrillation triggered by infection.  - Continue lopressor 12.5 mg BID  - Hold apixaban 2.5 BID due to hemoptysis. Can readdress need for anticoagulation outpatient once he has recovered from this acute episode.     Demand ischemia  Elevated troponin 45-->43 in the setting of afib w/ rvr and infection. No chest pain. EKG with no acute ischemic changes.     Hx of cirrhosis c/b HCC s/p DDLT (2018)  Hx of biliary stricture requiring stent, last ERCP 2019  Follows with hepatology, last visit 3/2023. CT A/P (2/28/24) with no ascites. Hepatology consulted for management of immunosuppressants iso infection.   - Hepatology  - Continue mycophenolate 500 mg BID  - Continue PTA Prednisone 5 mg daily   - Continue PTA Ursodiol 250 mg BID  - Plan for outpatient hepatology f/up  - Hepatology to send note to transplant coordinator with changes to medication     Type 2 diabetes  In the setting of steroids/immunosuppression. Follows with endocrinology as an outpatient, recent A1c 9.2%.  - Cont PTA lantus to 28 U QHS  - Cont PTA metformin 1000 mg   - Hold PTA jardiance 10 mg 2/2 UTI     DEREK - resolved  Non gap acidosis   Creatinine on admission was 1.26, not far from baseline of ~0.9. DEREK was likely pre-renal and resolved with IVF.     CAD s/p PCI to mLAD  Not on ASA but should be. Started apixaban this admission, but held given hemoptysis. Patient does not know why he is not on aspirin. Per last cardiology note he was to continue on 81 mg daily. Hx of hematuria and developed hemoptysis during this hospitalization, but no other bleeding events otherwise.  - Continue ASA 81 mg daily  - Continue PTA atorvastatin 40 mg     Hypertension  - Continue PTA hydralazine BID   - Continue PTA amlodipine 10 mg daily     Hypothyroidism  - Continue PTA levothyroxine 150 mcg     Hx of prostate cancer s/p RALP (2020)  - Follows with Urology as outpatient       ASSESSMENT           Discharge Assessment  How are you doing now  that you are home?: patient reports doing well. patient reports has not yet been outside, but feels strong. patient had Home Infusion come out to teach patient how to do home infusion of medication, and then called to check on him 3 days later. patient has not had help at home since. patient does feel competant to be at home until provider visit and did not want writer to call home care company and request they return to patient. patient needed no further support.  How are your symptoms? (Red Flag symptoms escalate to triage hotline per guidelines): Improved  Do you feel your condition is stable enough to be safe at home until your provider visit?: Yes  Does the patient have their discharge instructions? : Yes  Does the patient have questions regarding their discharge instructions? : No  Were you started on any new medications or were there changes to any of your previous medications? : Yes  Does the patient have all of their medications?: Yes  Do you have questions regarding any of your medications? : No  Do you have all of your needed medical supplies or equipment (DME)?  (i.e. oxygen tank, CPAP, cane, etc.): Yes  Discharge follow-up appointment scheduled within 14 calendar days? : Yes  Discharge Follow Up Appointment Date: 03/20/24  Discharge Follow Up Appointment Scheduled with?: Primary Care Provider  Is patient agreeable to assistance with scheduling? : Yes                PLAN                                                      Outpatient Plan:  RN graduated patient from program at this time. RN scheduled follow-up appt with PCP. Patient is stopping medication tomorrow with [facility] and has no concerns at this time.     Future Appointments   Date Time Provider Department Center   3/20/2024  1:00 PM Jaswinder Anne MD [facility]   3/25/2024  3:00 PM Effie Little OTR EDOT [address]   5/13/2024 10:30 AM  DERM [facility] SKIN CHECK DER [facility]   6/4/2024  2:30 PM Gilma Guthrie PA-C [facility]    6/5/2024  1:00 PM Blaze Kaminski DO [facility]   7/24/2024  1:45 PM Dez Aragon PA-C [facility]   9/20/2024  2:00 PM Sue Tavares MD [facility]         For any urgent concerns, please contact our 24 hour nurse triage line: [phone]       ADIA DICKSON RN

## 2024-03-13 NOTE — TELEPHONE ENCOUNTER
Left Voicemail (1st Attempt) for the patient to call back and schedule the following:    Appointment type: Return  Provider: Liborio  Return date: ~6week follow up (4/23/2024)  Specialty phone number: [phone]  Additional appointment(s) needed: chest ct  Additonal Notes: na

## 2024-03-15 ENCOUNTER — TELEPHONE (OUTPATIENT)
Dept: INFECTIOUS DISEASES | Facility: CLINIC | Age: 69
End: 2024-03-15
Payer: COMMERCIAL

## 2024-03-15 ENCOUNTER — APPOINTMENT (OUTPATIENT)
Dept: INTERPRETER SERVICES | Facility: CLINIC | Age: 69
End: 2024-03-15
Payer: COMMERCIAL

## 2024-03-15 NOTE — TELEPHONE ENCOUNTER
Patient Contacted for the patient to call back and schedule the following:    Appointment type: Return  Provider: Liborio  Return date: 4/23/2024  Specialty phone number: [phone]  Additional appointment(s) needed: Chest CT  Additonal Notes: NA

## 2024-03-15 NOTE — TELEPHONE ENCOUNTER
Patient Contacted for the patient to call back and schedule the following:    Appointment type: Chest CT  Provider: Liborio  Return date: NA  Specialty phone number: [phone]  Additional appointment(s) needed: Chest CT  Additonal Notes: NA

## 2024-03-17 ENCOUNTER — HEALTH MAINTENANCE LETTER (OUTPATIENT)
Age: 69
End: 2024-03-17

## 2024-03-19 ENCOUNTER — MEDICAL CORRESPONDENCE (OUTPATIENT)
Dept: HEALTH INFORMATION MANAGEMENT | Facility: CLINIC | Age: 69
End: 2024-03-19
Payer: COMMERCIAL

## 2024-03-19 LAB — BACTERIA BRONCH: NORMAL

## 2024-03-20 ENCOUNTER — OFFICE VISIT (OUTPATIENT)
Dept: FAMILY MEDICINE | Facility: CLINIC | Age: 69
End: 2024-03-20
Payer: COMMERCIAL

## 2024-03-20 ENCOUNTER — LAB (OUTPATIENT)
Dept: LAB | Facility: CLINIC | Age: 69
End: 2024-03-20
Payer: COMMERCIAL

## 2024-03-20 VITALS
HEART RATE: 66 BPM | SYSTOLIC BLOOD PRESSURE: 143 MMHG | BODY MASS INDEX: 28.58 KG/M2 | OXYGEN SATURATION: 96 % | DIASTOLIC BLOOD PRESSURE: 78 MMHG | HEIGHT: 67 IN | WEIGHT: 182.1 LBS

## 2024-03-20 DIAGNOSIS — I10 ESSENTIAL HYPERTENSION: ICD-10-CM

## 2024-03-20 DIAGNOSIS — Z12.11 SCREEN FOR COLON CANCER: Primary | ICD-10-CM

## 2024-03-20 DIAGNOSIS — Z11.59 ENCOUNTER FOR SCREENING FOR OTHER VIRAL DISEASES: ICD-10-CM

## 2024-03-20 DIAGNOSIS — E11.9 DM TYPE 2, GOAL HBA1C 7%-8% (H): ICD-10-CM

## 2024-03-20 DIAGNOSIS — Z29.11 NEED FOR VACCINATION AGAINST RESPIRATORY SYNCYTIAL VIRUS: ICD-10-CM

## 2024-03-20 DIAGNOSIS — N39.0 URINARY TRACT INFECTION WITHOUT HEMATURIA, SITE UNSPECIFIED: ICD-10-CM

## 2024-03-20 DIAGNOSIS — Z94.4 LIVER TRANSPLANT RECIPIENT (H): Chronic | ICD-10-CM

## 2024-03-20 DIAGNOSIS — N18.2 CHRONIC KIDNEY DISEASE, STAGE 2 (MILD): ICD-10-CM

## 2024-03-20 DIAGNOSIS — Z23 ENCOUNTER FOR IMMUNIZATION: ICD-10-CM

## 2024-03-20 DIAGNOSIS — Z09 HOSPITAL DISCHARGE FOLLOW-UP: ICD-10-CM

## 2024-03-20 LAB
ALBUMIN SERPL BCG-MCNC: 3.8 G/DL (ref 3.5–5.2)
ALBUMIN UR-MCNC: 200 MG/DL
ALP SERPL-CCNC: 294 U/L (ref 40–150)
ALT SERPL W P-5'-P-CCNC: 26 U/L (ref 0–70)
ANION GAP SERPL CALCULATED.3IONS-SCNC: 11 MMOL/L (ref 7–15)
APPEARANCE UR: CLEAR
AST SERPL W P-5'-P-CCNC: 22 U/L (ref 0–45)
BASOPHILS # BLD AUTO: 0 10E3/UL (ref 0–0.2)
BASOPHILS NFR BLD AUTO: 1 %
BILIRUB SERPL-MCNC: 0.4 MG/DL
BILIRUB UR QL STRIP: NEGATIVE
BUN SERPL-MCNC: 17.1 MG/DL (ref 8–23)
CALCIUM SERPL-MCNC: 9.1 MG/DL (ref 8.8–10.2)
CHLORIDE SERPL-SCNC: 107 MMOL/L (ref 98–107)
COLOR UR AUTO: ABNORMAL
CREAT SERPL-MCNC: 0.8 MG/DL (ref 0.67–1.17)
DEPRECATED HCO3 PLAS-SCNC: 22 MMOL/L (ref 22–29)
EGFRCR SERPLBLD CKD-EPI 2021: >90 ML/MIN/1.73M2
EOSINOPHIL # BLD AUTO: 0.1 10E3/UL (ref 0–0.7)
EOSINOPHIL NFR BLD AUTO: 1 %
ERYTHROCYTE [DISTWIDTH] IN BLOOD BY AUTOMATED COUNT: 13.2 % (ref 10–15)
GLUCOSE SERPL-MCNC: 132 MG/DL (ref 70–99)
GLUCOSE UR STRIP-MCNC: >=1000 MG/DL
HCT VFR BLD AUTO: 44 % (ref 40–53)
HGB BLD-MCNC: 14.1 G/DL (ref 13.3–17.7)
HGB UR QL STRIP: ABNORMAL
HYALINE CASTS: 2 /LPF
IMM GRANULOCYTES # BLD: 0 10E3/UL
IMM GRANULOCYTES NFR BLD: 0 %
KETONES UR STRIP-MCNC: NEGATIVE MG/DL
LEUKOCYTE ESTERASE UR QL STRIP: ABNORMAL
LYMPHOCYTES # BLD AUTO: 1.2 10E3/UL (ref 0.8–5.3)
LYMPHOCYTES NFR BLD AUTO: 23 %
MCH RBC QN AUTO: 27.4 PG (ref 26.5–33)
MCHC RBC AUTO-ENTMCNC: 32 G/DL (ref 31.5–36.5)
MCV RBC AUTO: 85 FL (ref 78–100)
MONOCYTES # BLD AUTO: 0.2 10E3/UL (ref 0–1.3)
MONOCYTES NFR BLD AUTO: 4 %
NEUTROPHILS # BLD AUTO: 3.9 10E3/UL (ref 1.6–8.3)
NEUTROPHILS NFR BLD AUTO: 71 %
NITRATE UR QL: NEGATIVE
NRBC # BLD AUTO: 0 10E3/UL
NRBC BLD AUTO-RTO: 0 /100
PH UR STRIP: 5.5 [PH] (ref 5–7)
PLATELET # BLD AUTO: 259 10E3/UL (ref 150–450)
POTASSIUM SERPL-SCNC: 4.1 MMOL/L (ref 3.4–5.3)
PROT SERPL-MCNC: 7.7 G/DL (ref 6.4–8.3)
RBC # BLD AUTO: 5.15 10E6/UL (ref 4.4–5.9)
RBC URINE: 8 /HPF
SODIUM SERPL-SCNC: 140 MMOL/L (ref 135–145)
SP GR UR STRIP: 1.02 (ref 1–1.03)
TRANSITIONAL EPI: <1 /HPF
UROBILINOGEN UR STRIP-MCNC: NORMAL MG/DL
WBC # BLD AUTO: 5.5 10E3/UL (ref 4–11)
WBC CLUMPS #/AREA URNS HPF: PRESENT /HPF
WBC URINE: 40 /HPF

## 2024-03-20 PROCEDURE — 81001 URINALYSIS AUTO W/SCOPE: CPT | Performed by: PATHOLOGY

## 2024-03-20 PROCEDURE — 85025 COMPLETE CBC W/AUTO DIFF WBC: CPT | Performed by: PATHOLOGY

## 2024-03-20 PROCEDURE — 99495 TRANSJ CARE MGMT MOD F2F 14D: CPT | Mod: 25 | Performed by: FAMILY MEDICINE

## 2024-03-20 PROCEDURE — G0008 ADMIN INFLUENZA VIRUS VAC: HCPCS | Performed by: FAMILY MEDICINE

## 2024-03-20 PROCEDURE — 87186 SC STD MICRODIL/AGAR DIL: CPT | Performed by: FAMILY MEDICINE

## 2024-03-20 PROCEDURE — 87340 HEPATITIS B SURFACE AG IA: CPT | Performed by: FAMILY MEDICINE

## 2024-03-20 PROCEDURE — 99000 SPECIMEN HANDLING OFFICE-LAB: CPT | Performed by: PATHOLOGY

## 2024-03-20 PROCEDURE — 86708 HEPATITIS A ANTIBODY: CPT | Performed by: FAMILY MEDICINE

## 2024-03-20 PROCEDURE — 87086 URINE CULTURE/COLONY COUNT: CPT | Performed by: FAMILY MEDICINE

## 2024-03-20 PROCEDURE — 90662 IIV NO PRSV INCREASED AG IM: CPT | Performed by: FAMILY MEDICINE

## 2024-03-20 PROCEDURE — 91320 SARSCV2 VAC 30MCG TRS-SUC IM: CPT | Performed by: FAMILY MEDICINE

## 2024-03-20 PROCEDURE — 36415 COLL VENOUS BLD VENIPUNCTURE: CPT | Performed by: PATHOLOGY

## 2024-03-20 PROCEDURE — 90480 ADMN SARSCOV2 VAC 1/ONLY CMP: CPT | Performed by: FAMILY MEDICINE

## 2024-03-20 PROCEDURE — 80053 COMPREHEN METABOLIC PANEL: CPT | Performed by: PATHOLOGY

## 2024-03-20 RX ORDER — RESPIRATORY SYNCYTIAL VIRUS VACCINE 120MCG/0.5
0.5 KIT INTRAMUSCULAR ONCE
Qty: 1 EACH | Refills: 0 | Status: CANCELLED | OUTPATIENT
Start: 2024-03-20 | End: 2024-03-20

## 2024-03-20 NOTE — PROGRESS NOTES
"  Assessment & Plan     Need for vaccination against respiratory syncytial virus  Do at drugstore    Screen for colon cancer  UTD till 2028 rvwd care ev    DM type 2, goal HbA1c 7%-8% (H)  Due, has Endo follow-up planned  - INFLUENZA VACCINE 65+ (FLUZONE HD)  - COVID-19 12+ (2023-24) (PFIZER)  - Adult Eye  Referral; Future    Essential hypertension  Cont as is  - CBC with platelets and differential; Future  - Comprehensive metabolic panel (BMP + Alb, Alk Phos, ALT, AST, Total. Bili, TP); Future    Liver transplant recipient (H)  Due  - Adult GI  Referral - Consult Only; Future  - Hepatitis A Antibody Total; Future  - Hepatitis B surface antigen; Future    Hospital discharge follow-up  Reviewed in detail    Urinary tract infection without hematuria, site unspecified  Recheck; sees Uro in summer  - UA Macroscopic with reflex to Microscopic and Culture - Lab Collect; Future    Chronic kidney disease, stage 2 (mild)  Due  - Adult Nephrology  Referral; Future    Encounter for immunization    - INFLUENZA VACCINE 65+ (FLUZONE HD)    Encounter for screening for other viral diseases    - Hepatitis B surface antigen; Future      65 minutes spent by me on the date of the encounter doing chart review, history and exam, documentation and further activities per the note    MED REC REQUIRED{  Post Medication Reconciliation Status:   BMI  Estimated body mass index is 28.52 kg/m  as calculated from the following:    Height as of this encounter: 1.702 m (5' 7\").    Weight as of this encounter: 82.6 kg (182 lb 1.6 oz).   Weight management plan: Discussed healthy diet and exercise guidelines  The longitudinal plan of care for the diagnosis(es)/condition(s) as documented were addressed during this visit. Due to the added complexity in care, I will continue to support Loy in the subsequent management and with ongoing continuity of care.      No follow-ups on file.    Subjective   Loy is a 68 year " old, presenting for the following health issues:  Hospital F/U (Illness I have gives me lung and kidney pain)      3/20/2024    12:49 PM   Additional Questions   Roomed by [facility] EMT     HPI   Here in follow-up  Inpt notes and notes since I last saw rvwd at length, w/ in person   Stable; since home, not worse, all sx better  No fever n/v, normal stools  No cough/sob  Has chest ct and ID follow-up planned  Had complex UTI in hosp dx'd, off atbx, no LUTS sx now, mild L flank pain, will do UA we agree  Has follow-up DM clinic set  Needs eye MD discussed  Needs renal follow-up (2023 note rvwd), recent creat wnl  Needs hepatology (transplant) follow-up  Given liver status unclear if documented lack Hep B or Hep A immunity will check  Due for flu covid shots ok doing now; due for rsv I discuss and suggest drugstore  Past Medical History:   Diagnosis Date    Anemia     Biliary stricture of transplanted liver (H) 2018    BPH (benign prostatic hyperplasia)     Cancer (H)     hepatocellualr carcinoma    Cirrhosis of liver (H) 2018    Diabetes (H)     Enlarged prostate     Hypothyroidism     Impotence of organic origin 2020    Inguinal hernia     Repaired with mesh on 18    Liver lesion 2018    Liver transplanted (H) 2018     donor liver transplant    Portal vein thrombosis     on path explant    Postoperative atrial fibrillation (H) 2018    Prostate cancer (H)     TB lung, latent     Treated      Past Surgical History:   Procedure Laterality Date    APPENDECTOMY      BRONCHOSCOPY (RIGID OR FLEXIBLE), DIAGNOSTIC N/A 2024    Procedure: BRONCHOSCOPY, WITH BRONCHOALVEOLAR LAVAGE;  Surgeon: Jimmy Farley MD;  [facility]    COLONOSCOPY          CV CORONARY ANGIOGRAM N/A 10/13/2021    Procedure: CV CORONARY ANGIOGRAM;  Surgeon: Yaya Hampton MD;  [facility]    CV CORONARY LITHOTRIPSY PCI N/A 10/13/2021    Procedure:  "CV Coronary Lithotripsy PCI;  Surgeon: Yaya Hampton MD;  Location:  [facility]    CV INSTANTANEOUS WAVE-FREE RATIO N/A 10/13/2021    Procedure: Instantaneous Wave-Free Ratio;  Surgeon: Yaya Hampton MD;  Location:  [facility]    CV INTRAVASULAR ULTRASOUND N/A 10/13/2021    Procedure: Intravascular Ultrasound;  Surgeon: Yaya Hampton MD;  Location:  [facility]    CV PCI STENT DRUG ELUTING N/A 10/13/2021    Procedure: Percutaneous Coronary Intervention Stent Drug Eluting;  Surgeon: Yaya Hampton MD;  Location:  [facility]    DAVINCI PROSTATECTOMY N/A 2020    Procedure: Robot-assisted laparoscopic radical prostatectomy, Bladder biopsy;  Surgeon: Kenrick Casiano MD;  Location: [facility]    ENDOSCOPIC RETROGRADE CHOLANGIOPANCREATOGRAM N/A 2018    Procedure: ENDOSCOPIC RETROGRADE CHOLANGIOPANCREATOGRAM, with Billary Sphincterotomy and Billary Stent Placement;  Surgeon: Omero Lawler MD;  [facility]    ENDOSCOPIC RETROGRADE CHOLANGIOPANCREATOGRAM  2019    Procedure: COMBINED ENDOSCOPIC RETROGRADE CHOLANGIOPANCREATOGRAPHY, Bile Duct Stent Exchange;  Surgeon: Omero Lawler MD;  [facility]    ENDOSCOPIC RETROGRADE CHOLANGIOPANCREATOGRAM N/A 2019    Procedure: ENDOSCOPIC RETROGRADE CHOLANGIOPANCREATOGRAPHY, WITH BILIARY STENT REMOVAL AND STONE EXTRACTION;  Surgeon: Omero Lawler MD;  [facility]    HERNIORRHAPHY INGUINAL  2018    Procedure: Herniorrhaphy inguinal;  Surgeon: Emil Echevarria MD;  [facility]    IR LIVER BIOPSY PERCUTANEOUS  2018    LAPAROTOMY EXPLORATORY      per the patient \"for infection in the LLQ\"     PICC INSERTION Right 2024    47 cm basilic    TRANSPLANT LIVER RECIPIENT  DONOR N/A 2018    Procedure: TRANSPLANT LIVER RECIPIENT  DONOR;  Surgeon: Swathi" MD Emil;  [facility]     Current Outpatient Medications   Medication    Alcohol Swabs PADS    amLODIPine (NORVASC) 10 MG tablet    aspirin (ASA) 81 MG chewable tablet    atorvastatin (LIPITOR) 40 MG tablet    blood glucose (NO BRAND SPECIFIED) lancets standard    blood glucose (NO BRAND SPECIFIED) lancets standard    blood glucose (NO BRAND SPECIFIED) test strip    blood glucose (NO BRAND SPECIFIED) test strip    blood glucose (ONE TOUCH SURESOFT) lancing device    blood glucose monitoring (NO BRAND SPECIFIED) meter device kit    blood glucose monitoring (ONE TOUCH ULTRA 2) meter device kit    blood glucose monitoring (ONE TOUCH ULTRASOFT) lancets    hydrALAZINE (APRESOLINE) 25 MG tablet    insulin glargine (LANTUS PEN) 100 UNIT/ML pen    insulin pen needle (31G X 5 MM) 31G X 5 MM miscellaneous    levothyroxine (SYNTHROID/LEVOTHROID) 150 MCG tablet    metFORMIN (GLUCOPHAGE XR) 500 MG 24 hr tablet    metoprolol tartrate (LOPRESSOR) 25 MG tablet    mycophenolate (GENERIC EQUIVALENT) 250 MG capsule    predniSONE (DELTASONE) 5 MG tablet    ursodiol (ACTIGALL) 250 MG tablet    Vitamin D3 (CHOLECALCIFEROL) 25 mcg (1000 units) tablet     No current facility-administered medications for this visit.     No Known Allergies  Family History   Problem Relation Age of Onset    Memory loss Mother     Liver Disease Brother     Skin Cancer No family hx of     Melanoma No family hx of      Social History     Socioeconomic History    Marital status:      Spouse name: Not on file    Number of children: Not on file    Years of education: Not on file    Highest education level: Not on file   Occupational History    Not on file   Tobacco Use    Smoking status: Former     Packs/day: 0.03     Years: 20.00     Additional pack years: 0.00     Total pack years: 0.60     Types: Cigarettes, Cigars     Start date: 1970     Quit date: 3/14/2018     Years since quittin.0    Smokeless tobacco: Never    Tobacco comments:      "Quit smoking Feb 2018, one cigarette after dinner   Vaping Use    Vaping Use: Never used   Substance and Sexual Activity    Alcohol use: No     Comment: Quit drinking Feb 2018    Drug use: No    Sexual activity: Not on file   Other Topics Concern    Parent/sibling w/ CABG, MI or angioplasty before 65F 55M? Not Asked   Social History Narrative    Born in [place], came to [place] 30 years ago.     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cardboard, trimming meats     Social Determinants of Health     Financial Resource Strain: Not on file   Food Insecurity: Not on file   Transportation Needs: Not on file   Physical Activity: Not on file   Stress: Not on file   Social Connections: Not on file   Interpersonal Safety: Low Risk  (3/20/2024)    Interpersonal Safety     Do you feel physically and emotionally safe where you currently live?: Yes     Within the past 12 months, have you been hit, slapped, kicked or otherwise physically hurt by someone?: No     Within the past 12 months, have you been humiliated or emotionally abused in other ways by your partner or ex-partner?: No   Housing Stability: Not on file         Has chest ct set up in two days      Objective    BP (!) 143/78 (BP Location: Right arm, Patient Position: Sitting, Cuff Size: Adult Regular)   Pulse 66   Ht 1.702 m (5' 7\")   Wt 82.6 kg (182 lb 1.6 oz)   SpO2 96%   BMI 28.52 kg/m    Body mass index is 28.52 kg/m .  Physical Exam   GENERAL: alert and no distress  NECK: no adenopathy, no asymmetry, masses, or scars  RESP: lungs clear to auscultation - no rales, rhonchi or wheezes  CV: regular rate and rhythm, normal S1 S2, no S3 or S4, no murmur, click or rub, no peripheral edema  ABDOMEN: soft, nontender, no hepatosplenomegaly, no masses and bowel sounds normal, no flank tender  MS: no gross musculoskeletal defects noted, no edema            Signed Electronically by: Jaswinder Anne MD    "

## 2024-03-21 ENCOUNTER — TELEPHONE (OUTPATIENT)
Dept: TRANSPLANT | Facility: CLINIC | Age: 69
End: 2024-03-21
Payer: COMMERCIAL

## 2024-03-21 DIAGNOSIS — Z94.4 LIVER TRANSPLANTED (H): Primary | ICD-10-CM

## 2024-03-21 LAB
BACTERIA UR CULT: ABNORMAL
HAV AB SER QL IA: REACTIVE
HBV SURFACE AG SERPL QL IA: NONREACTIVE

## 2024-03-22 ENCOUNTER — ANCILLARY PROCEDURE (OUTPATIENT)
Dept: CT IMAGING | Facility: CLINIC | Age: 69
End: 2024-03-22
Attending: INTERNAL MEDICINE
Payer: COMMERCIAL

## 2024-03-22 ENCOUNTER — DOCUMENTATION ONLY (OUTPATIENT)
Dept: INFECTIOUS DISEASES | Facility: CLINIC | Age: 69
End: 2024-03-22

## 2024-03-22 ENCOUNTER — TELEPHONE (OUTPATIENT)
Dept: INTERNAL MEDICINE | Facility: CLINIC | Age: 69
End: 2024-03-22

## 2024-03-22 DIAGNOSIS — R04.2 HEMOPTYSIS: ICD-10-CM

## 2024-03-22 DIAGNOSIS — N39.0 URINARY TRACT INFECTION WITHOUT HEMATURIA, SITE UNSPECIFIED: Primary | ICD-10-CM

## 2024-03-22 PROCEDURE — 71250 CT THORAX DX C-: CPT | Mod: GC | Performed by: RADIOLOGY

## 2024-03-22 RX ORDER — SULFAMETHOXAZOLE/TRIMETHOPRIM 800-160 MG
1 TABLET ORAL 2 TIMES DAILY
Qty: 14 TABLET | Refills: 0 | Status: SHIPPED | OUTPATIENT
Start: 2024-03-22 | End: 2024-09-20

## 2024-03-22 NOTE — TELEPHONE ENCOUNTER
Pt was informed the result and recommendation via .    [facility]  ----------------------------------------------------------------------------------------------------        ----- Message from Jaswinder Anne MD sent at 3/22/2024  7:43 AM CDT -----  Can you let him know has UTI I sent in atbx to his [facility]

## 2024-03-22 NOTE — TELEPHONE ENCOUNTER
"Call to Claribel w/ the assistance of a  [id number]. Claribel several medical issues and specialists that he sees.  He was recently hospitalized for pneumonia - he feels he is 90% back to himself.  He says he never had a cough though he did have a fever when he went to the ER.  He has a good appetite now - he had lost 17 pounds.  \"I feel like this never happened\".  He had a follow-up appt w/ Dr. Berumen.   He has not seen a hepatologist in a long time.  He says that he called to make an appt w/ Dr. Carballo and was told that he is gone.  I told him that his new hepatologist is Dr. Izabela Galvan. He repeated this.   He also recently had hemorrhagic cystis for which he was treated.   He gets short of breath sometimes and his chest hurts when he pushes.  \"When I  breath my chest hurts\" but this getting better.   \"I still  haven't taken the medication that the gave me\".  I asked him what this is - he pulled out a bag and spelled it. It was Bactrim.  I told him to take this, 1 tablet in the morning and in the evening.  I confirmed that he is still taking MMF and pred 5 mg daily.   He has an endocrinologist.  He feels like he knows what to do to treat his blood sugars \"I dont' eat anything that is bad for me\".    I told him I will make an appt for him to see Dr. Galvan.  I also reminded him to do labs again in May or June. He asked me to make him an appt at the [facility].  "

## 2024-03-26 DIAGNOSIS — Z94.4 LIVER TRANSPLANT RECIPIENT (H): Primary | Chronic | ICD-10-CM

## 2024-04-02 LAB
BACTERIA BRONCH: NO GROWTH
BACTERIA BRONCH: NO GROWTH

## 2024-04-12 ENCOUNTER — NURSE TRIAGE (OUTPATIENT)
Dept: NURSING | Facility: CLINIC | Age: 69
End: 2024-04-12

## 2024-04-12 ENCOUNTER — THERAPY VISIT (OUTPATIENT)
Dept: OCCUPATIONAL THERAPY | Facility: CLINIC | Age: 69
End: 2024-04-12
Payer: COMMERCIAL

## 2024-04-12 ENCOUNTER — HOSPITAL ENCOUNTER (INPATIENT)
Facility: CLINIC | Age: 69
LOS: 5 days | Discharge: HOME | DRG: 871 | End: 2024-04-17
Attending: EMERGENCY MEDICINE | Admitting: STUDENT IN AN ORGANIZED HEALTH CARE EDUCATION/TRAINING PROGRAM
Payer: COMMERCIAL

## 2024-04-12 ENCOUNTER — APPOINTMENT (OUTPATIENT)
Dept: GENERAL RADIOLOGY | Facility: CLINIC | Age: 69
DRG: 871 | End: 2024-04-12
Attending: EMERGENCY MEDICINE
Payer: COMMERCIAL

## 2024-04-12 DIAGNOSIS — R11.10 VOMITING, UNSPECIFIED VOMITING TYPE, UNSPECIFIED WHETHER NAUSEA PRESENT: Primary | ICD-10-CM

## 2024-04-12 DIAGNOSIS — R41.0 CONFUSION: ICD-10-CM

## 2024-04-12 DIAGNOSIS — R41.3 MEMORY LOSS: Primary | ICD-10-CM

## 2024-04-12 DIAGNOSIS — R41.3 MEMORY CHANGES: ICD-10-CM

## 2024-04-12 DIAGNOSIS — Z79.4 TYPE 2 DIABETES MELLITUS WITH OTHER SPECIFIED COMPLICATION, WITH LONG-TERM CURRENT USE OF INSULIN (H): ICD-10-CM

## 2024-04-12 DIAGNOSIS — R00.0 TACHYCARDIA: ICD-10-CM

## 2024-04-12 DIAGNOSIS — E11.69 TYPE 2 DIABETES MELLITUS WITH OTHER SPECIFIED COMPLICATION, WITH LONG-TERM CURRENT USE OF INSULIN (H): ICD-10-CM

## 2024-04-12 DIAGNOSIS — A41.9 SEPSIS, DUE TO UNSPECIFIED ORGANISM, UNSPECIFIED WHETHER ACUTE ORGAN DYSFUNCTION PRESENT (H): ICD-10-CM

## 2024-04-12 DIAGNOSIS — Z94.4 LIVER REPLACED BY TRANSPLANT (H): ICD-10-CM

## 2024-04-12 DIAGNOSIS — I48.91 ATRIAL FIBRILLATION WITH RAPID VENTRICULAR RESPONSE (H): ICD-10-CM

## 2024-04-12 DIAGNOSIS — R06.82 TACHYPNEA: ICD-10-CM

## 2024-04-12 LAB
ABO/RH(D): NORMAL
ALBUMIN SERPL BCG-MCNC: 3.9 G/DL (ref 3.5–5.2)
ALBUMIN UR-MCNC: 300 MG/DL
ALP SERPL-CCNC: 231 U/L (ref 40–150)
ALT SERPL W P-5'-P-CCNC: 25 U/L (ref 0–70)
ANION GAP SERPL CALCULATED.3IONS-SCNC: 13 MMOL/L (ref 7–15)
ANTIBODY SCREEN: NEGATIVE
APPEARANCE UR: CLEAR
APTT PPP: 26 SECONDS (ref 22–38)
AST SERPL W P-5'-P-CCNC: 25 U/L (ref 0–45)
ATRIAL RATE - MUSE: 147 BPM
BACTERIA #/AREA URNS HPF: ABNORMAL /HPF
BASE EXCESS BLDV CALC-SCNC: -2 MMOL/L (ref -3–3)
BASE EXCESS BLDV CALC-SCNC: -4.1 MMOL/L (ref -3–3)
BASOPHILS # BLD AUTO: 0 10E3/UL (ref 0–0.2)
BASOPHILS NFR BLD AUTO: 0 %
BILIRUB SERPL-MCNC: 0.7 MG/DL
BILIRUB UR QL STRIP: NEGATIVE
BUN SERPL-MCNC: 22.4 MG/DL (ref 8–23)
CALCIUM SERPL-MCNC: 8.7 MG/DL (ref 8.8–10.2)
CHLORIDE SERPL-SCNC: 106 MMOL/L (ref 98–107)
COLOR UR AUTO: ABNORMAL
CREAT SERPL-MCNC: 1.29 MG/DL (ref 0.67–1.17)
DEPRECATED HCO3 PLAS-SCNC: 17 MMOL/L (ref 22–29)
DIASTOLIC BLOOD PRESSURE - MUSE: NORMAL MMHG
EGFRCR SERPLBLD CKD-EPI 2021: 60 ML/MIN/1.73M2
EOSINOPHIL # BLD AUTO: 0 10E3/UL (ref 0–0.7)
EOSINOPHIL NFR BLD AUTO: 1 %
ERYTHROCYTE [DISTWIDTH] IN BLOOD BY AUTOMATED COUNT: 14 % (ref 10–15)
GLUCOSE SERPL-MCNC: 207 MG/DL (ref 70–99)
GLUCOSE UR STRIP-MCNC: >=1000 MG/DL
HCO3 BLDV-SCNC: 21 MMOL/L (ref 21–28)
HCO3 BLDV-SCNC: 23 MMOL/L (ref 21–28)
HCT VFR BLD AUTO: 43.2 % (ref 40–53)
HGB BLD-MCNC: 14.2 G/DL (ref 13.3–17.7)
HGB UR QL STRIP: ABNORMAL
HOLD SPECIMEN: NORMAL
IMM GRANULOCYTES # BLD: 0 10E3/UL
IMM GRANULOCYTES NFR BLD: 0 %
INR PPP: 1.07 (ref 0.85–1.15)
INTERPRETATION ECG - MUSE: NORMAL
KETONES UR STRIP-MCNC: NEGATIVE MG/DL
LACTATE BLD-SCNC: 1.6 MMOL/L
LACTATE SERPL-SCNC: 2.1 MMOL/L (ref 0.7–2)
LEUKOCYTE ESTERASE UR QL STRIP: ABNORMAL
LYMPHOCYTES # BLD AUTO: 1.1 10E3/UL (ref 0.8–5.3)
LYMPHOCYTES NFR BLD AUTO: 20 %
MAGNESIUM SERPL-MCNC: 1.7 MG/DL (ref 1.7–2.3)
MCH RBC QN AUTO: 28.2 PG (ref 26.5–33)
MCHC RBC AUTO-ENTMCNC: 32.9 G/DL (ref 31.5–36.5)
MCV RBC AUTO: 86 FL (ref 78–100)
MONOCYTES # BLD AUTO: 0.1 10E3/UL (ref 0–1.3)
MONOCYTES NFR BLD AUTO: 2 %
MUCOUS THREADS #/AREA URNS LPF: PRESENT /LPF
NEUTROPHILS # BLD AUTO: 4.2 10E3/UL (ref 1.6–8.3)
NEUTROPHILS NFR BLD AUTO: 77 %
NITRATE UR QL: NEGATIVE
NRBC # BLD AUTO: 0 10E3/UL
NRBC BLD AUTO-RTO: 0 /100
O2/TOTAL GAS SETTING VFR VENT: 21 %
OXYHGB MFR BLDV: 70 % (ref 70–75)
P AXIS - MUSE: NORMAL DEGREES
PCO2 BLDV: 37 MM HG (ref 40–50)
PCO2 BLDV: 38 MM HG (ref 40–50)
PH BLDV: 7.36 [PH] (ref 7.32–7.43)
PH BLDV: 7.39 [PH] (ref 7.32–7.43)
PH UR STRIP: 6 [PH] (ref 5–7)
PLATELET # BLD AUTO: 180 10E3/UL (ref 150–450)
PO2 BLDV: 31 MM HG (ref 25–47)
PO2 BLDV: 38 MM HG (ref 25–47)
POTASSIUM SERPL-SCNC: 3.9 MMOL/L (ref 3.4–5.3)
PR INTERVAL - MUSE: NORMAL MS
PROCALCITONIN SERPL IA-MCNC: 0.55 NG/ML
PROT SERPL-MCNC: 7.1 G/DL (ref 6.4–8.3)
QRS DURATION - MUSE: 90 MS
QT - MUSE: 296 MS
QTC - MUSE: 484 MS
R AXIS - MUSE: -25 DEGREES
RBC # BLD AUTO: 5.04 10E6/UL (ref 4.4–5.9)
RBC URINE: 6 /HPF
SAO2 % BLDV: 58 % (ref 70–75)
SAO2 % BLDV: 71.2 % (ref 70–75)
SODIUM SERPL-SCNC: 136 MMOL/L (ref 135–145)
SP GR UR STRIP: 1.01 (ref 1–1.03)
SPECIMEN EXPIRATION DATE: NORMAL
SQUAMOUS EPITHELIAL: <1 /HPF
SYSTOLIC BLOOD PRESSURE - MUSE: NORMAL MMHG
T AXIS - MUSE: 114 DEGREES
TROPONIN T SERPL HS-MCNC: 25 NG/L
UROBILINOGEN UR STRIP-MCNC: NORMAL MG/DL
VENTRICULAR RATE- MUSE: 161 BPM
WBC # BLD AUTO: 5.5 10E3/UL (ref 4–11)
WBC URINE: 79 /HPF

## 2024-04-12 PROCEDURE — 81015 MICROSCOPIC EXAM OF URINE: CPT | Performed by: EMERGENCY MEDICINE

## 2024-04-12 PROCEDURE — 82040 ASSAY OF SERUM ALBUMIN: CPT | Performed by: EMERGENCY MEDICINE

## 2024-04-12 PROCEDURE — 97530 THERAPEUTIC ACTIVITIES: CPT | Mod: GO

## 2024-04-12 PROCEDURE — 71045 X-RAY EXAM CHEST 1 VIEW: CPT

## 2024-04-12 PROCEDURE — 85730 THROMBOPLASTIN TIME PARTIAL: CPT | Performed by: EMERGENCY MEDICINE

## 2024-04-12 PROCEDURE — 93005 ELECTROCARDIOGRAM TRACING: CPT | Performed by: EMERGENCY MEDICINE

## 2024-04-12 PROCEDURE — 258N000003 HC RX IP 258 OP 636: Performed by: EMERGENCY MEDICINE

## 2024-04-12 PROCEDURE — 87633 RESP VIRUS 12-25 TARGETS: CPT | Performed by: EMERGENCY MEDICINE

## 2024-04-12 PROCEDURE — 87086 URINE CULTURE/COLONY COUNT: CPT | Performed by: EMERGENCY MEDICINE

## 2024-04-12 PROCEDURE — 120N000005 HC R&B MS OVERFLOW UMMC

## 2024-04-12 PROCEDURE — 93010 ELECTROCARDIOGRAM REPORT: CPT | Performed by: EMERGENCY MEDICINE

## 2024-04-12 PROCEDURE — 87040 BLOOD CULTURE FOR BACTERIA: CPT | Performed by: EMERGENCY MEDICINE

## 2024-04-12 PROCEDURE — 87899 AGENT NOS ASSAY W/OPTIC: CPT

## 2024-04-12 PROCEDURE — 96366 THER/PROPH/DIAG IV INF ADDON: CPT | Performed by: EMERGENCY MEDICINE

## 2024-04-12 PROCEDURE — 82803 BLOOD GASES ANY COMBINATION: CPT

## 2024-04-12 PROCEDURE — 96365 THER/PROPH/DIAG IV INF INIT: CPT | Performed by: EMERGENCY MEDICINE

## 2024-04-12 PROCEDURE — 85025 COMPLETE CBC W/AUTO DIFF WBC: CPT | Performed by: EMERGENCY MEDICINE

## 2024-04-12 PROCEDURE — 96375 TX/PRO/DX INJ NEW DRUG ADDON: CPT | Performed by: EMERGENCY MEDICINE

## 2024-04-12 PROCEDURE — 96125 COGNITIVE TEST BY HC PRO: CPT | Mod: GO

## 2024-04-12 PROCEDURE — 84145 PROCALCITONIN (PCT): CPT | Performed by: EMERGENCY MEDICINE

## 2024-04-12 PROCEDURE — 93246 EXT ECG>7D<15D RECORDING: CPT | Performed by: STUDENT IN AN ORGANIZED HEALTH CARE EDUCATION/TRAINING PROGRAM

## 2024-04-12 PROCEDURE — 36415 COLL VENOUS BLD VENIPUNCTURE: CPT | Performed by: EMERGENCY MEDICINE

## 2024-04-12 PROCEDURE — 83605 ASSAY OF LACTIC ACID: CPT | Performed by: EMERGENCY MEDICINE

## 2024-04-12 PROCEDURE — 97535 SELF CARE MNGMENT TRAINING: CPT | Mod: 59

## 2024-04-12 PROCEDURE — 83735 ASSAY OF MAGNESIUM: CPT | Performed by: EMERGENCY MEDICINE

## 2024-04-12 PROCEDURE — 87637 SARSCOV2&INF A&B&RSV AMP PRB: CPT | Performed by: EMERGENCY MEDICINE

## 2024-04-12 PROCEDURE — 99291 CRITICAL CARE FIRST HOUR: CPT | Mod: 25 | Performed by: EMERGENCY MEDICINE

## 2024-04-12 PROCEDURE — 82805 BLOOD GASES W/O2 SATURATION: CPT | Performed by: EMERGENCY MEDICINE

## 2024-04-12 PROCEDURE — 71045 X-RAY EXAM CHEST 1 VIEW: CPT | Mod: 26 | Performed by: RADIOLOGY

## 2024-04-12 PROCEDURE — 82784 ASSAY IGA/IGD/IGG/IGM EACH: CPT | Performed by: STUDENT IN AN ORGANIZED HEALTH CARE EDUCATION/TRAINING PROGRAM

## 2024-04-12 PROCEDURE — 85610 PROTHROMBIN TIME: CPT | Performed by: EMERGENCY MEDICINE

## 2024-04-12 PROCEDURE — 84484 ASSAY OF TROPONIN QUANT: CPT | Performed by: EMERGENCY MEDICINE

## 2024-04-12 PROCEDURE — 81003 URINALYSIS AUTO W/O SCOPE: CPT | Performed by: EMERGENCY MEDICINE

## 2024-04-12 PROCEDURE — 99285 EMERGENCY DEPT VISIT HI MDM: CPT | Mod: 25 | Performed by: EMERGENCY MEDICINE

## 2024-04-12 PROCEDURE — 250N000011 HC RX IP 250 OP 636: Performed by: EMERGENCY MEDICINE

## 2024-04-12 RX ORDER — DILTIAZEM HYDROCHLORIDE 5 MG/ML
15 INJECTION INTRAVENOUS ONCE
Status: COMPLETED | OUTPATIENT
Start: 2024-04-12 | End: 2024-04-12

## 2024-04-12 RX ORDER — DILTIAZEM HCL/D5W 125 MG/125
5-15 PLASTIC BAG, INJECTION (ML) INTRAVENOUS CONTINUOUS
Status: DISCONTINUED | OUTPATIENT
Start: 2024-04-12 | End: 2024-04-13

## 2024-04-12 RX ORDER — PIPERACILLIN SODIUM, TAZOBACTAM SODIUM 4; .5 G/20ML; G/20ML
4.5 INJECTION, POWDER, LYOPHILIZED, FOR SOLUTION INTRAVENOUS ONCE
Status: COMPLETED | OUTPATIENT
Start: 2024-04-12 | End: 2024-04-12

## 2024-04-12 RX ADMIN — DILTIAZEM HYDROCHLORIDE 15 MG: 5 INJECTION, SOLUTION INTRAVENOUS at 21:39

## 2024-04-12 RX ADMIN — VANCOMYCIN HYDROCHLORIDE 1750 MG: 10 INJECTION, POWDER, LYOPHILIZED, FOR SOLUTION INTRAVENOUS at 23:07

## 2024-04-12 RX ADMIN — PIPERACILLIN SODIUM AND TAZOBACTAM SODIUM 4.5 G: 4; .5 INJECTION, POWDER, LYOPHILIZED, FOR SOLUTION INTRAVENOUS at 21:40

## 2024-04-12 RX ADMIN — SODIUM CHLORIDE 1000 ML: 9 INJECTION, SOLUTION INTRAVENOUS at 21:40

## 2024-04-12 RX ADMIN — Medication 5 MG/HR: at 23:22

## 2024-04-12 ASSESSMENT — ACTIVITIES OF DAILY LIVING (ADL)
ADLS_ACUITY_SCORE: 35
ADLS_ACUITY_SCORE: 35

## 2024-04-12 NOTE — PROGRESS NOTES
Cognitive Performance Test    SUMMARY OF TEST:    The Cognitive Performance Test (CPT) is a standardized performance-based assessment to measure working memory/executive function processing capacities that underlie functional performance. Subtasks include common basic and instrumental activities of daily living (ADL/IADL) which are rated based on the manner in which patients respond to task demands of varying complexity. The total CPT score describes a level of functioning that indicates how information is processed, implications for functional activities, potential safety risks and a recommended level of supervision or assist based on cognitive function. The highest total score on this test is in the range of 5.6 to 5.8.    DATE OF TESTIN24    Ordering Provider: Blaze Kaminski DO    Order date:24    Order Diagnosis: Memory loss [R41.3]  - Primary    Occupational Profile (including diagnosis and medical history): Patient is a 68 year old, Trinidadian speaking male who was referred to outpatient occupational therapy at the request of Dr. Kaminski for the administration of the MoCA ([place]) and the Cognitive Performance Test (CPT). The patient was initially seen in neurologic consultation on 2023 for evaluation of memory concerns and had a follow up 24 -please see the comprehensive neurologic consultation notes from those dates in the Epic records for details. Pertinent medical history of DMII with polyneuropathy, HTN, prostate cancer, hepatocellular carcinoma s/p liver transplant requiring subsequent immunosuppression, CKD, and pulmonary TB.Patient presented to clinic accompanied by his wife and was seen independently. An in-person  was used during session. Patient states he notice some memory changes approximately 2 years ago and this is remained relatively stable. He describes functional memory changes such as forgetting why he went into a room, why he  retrieved an object, or misplacing belongings. He continues to manage finances, medications, drives, and wife provides assistance for meal preparation and cleaning/laundry at baseline. He acknowledges his wife sets up the mail to remind him of paying bills. He works picking up litter at the mall. His highest level of education is that of never attending a school.  He did learn to read and write by himself    Previous cognitive screens completed in OT or by Physician and score:   4/12/24: The [place] Cognitive Assessment (MoCA) is a brief screen of cognitive abilities used to detect cognitive impairments in the domains of visuospatial/executive functioning, naming, memory, attention, language, abstraction, and orientation. Prydeinig version utilized with assistance from . A score of 26 or above is considered normal with a total possible score of 30.  18-26 = mild cognitive impairment, 10-17= moderate cognitive impairment and less than 10= severe cognitive impairment. Results revealed a score of 19/30, suggesting moderate cognitive impairment (+1 due to < 12 years of education)    Functional History Interview:    Informants: Patient     Client s perception of memory/ thinking or functional difficulties: Patient feels like memory changes over the past 2 years.  He describes functional memory changes such as forgetting why he went into a room, why he retrieved an object, or misplacing belongings.     Living situation: Patient lives in apartment with his wife     Home maintenance activities: His wife manages cleaning and laundry tasks     Meal preparation and grocery shopping: Wife prepares meals and shopping. He states he is able to make meals and he is learning how to make tortillas. He has forgotten to turn stove off but after a few seconds he remembered to turn it off     Finances and paying bills: Patient manages finances and his wife sets up mail for him to help him remember     Medication management:  Patient state he sorts pills by color/shape in pillbox and he is aware of each pill. He notes his medication regimen fluctuates based on his lab results. He states he remembers to take his medications     Driving and transportation: Patient continues to drive without issues. He denies getting lost, tickets or accidents     Leisure and routine activities: non stated     ADL/Mobility/Physical Functioning: Patient is independent in ADL's and functional mobility     Fall Risk Screen:   Has the patient fallen 2 or more times in the last year? No      Has the patient fallen and had an injury in the past year? No       Timed Up and Go Score: NT    Is the patient a fall risk? No      RESULTS OF TESTING:                                                                                         CPT Subtest Results    MEDBOX: 5/6 SHOP/GLOVES: 5/6 PHONE: 4/6   WASH:  5/5 TRAVEL: NT/6 TOAST: 5/5   DRESS: NT/5   TOTAL CPT SCORE:  24/28     Test administered with  - patient unable to understand written and verbal English. This test cannot be norm referenced due to need for , however can provide insight into deficits and recommendations for home safety     Average CPT Score  4.8/5.6    INTERPRETATION OF TEST RESULTS:    Based on the Cognitive Performance Test, this patient scored at CPT Level 5.0.  See CPT Levels reference below.    Summary of functional cognitive status:   Results reveals mild functional decline. Start of difficulty with complex tasks - Shows start of decline in memory, keeping track of details, decision-making, planning, and organization. Can learn new information. Individuals at this level are able to complete basic daily and routine household tasks but requires assistance for more complex tasks such as complex or new medication schedules, appointments, finances, higher level household tasks, etc. Caregiver may need to: urge patient to use notes, daily checklists, calendars or there reminders;  make tasks easier and limit tasks that are too hard; allow extra time; monitor, supervise or help with complex tasks.     Factors affecting performance:  Other:  language/cultural barrier.   This test cannot be norm referenced due to need for , however can provide insight into deficits and recommendations for home safety     Recommendations:    Supervision/oversight for ADL/IADL:  New or complex medication management, meal preparation, finances  Supervision in living setting:  Check-in support and assistance with IADL's may be necessary for independent living. Benefits form a safe environment, established routine, and weekly check in             It is recommended that patient receive supervision/oversight for new or complex medication set up and schedule, finances, and meal preparation.  Recommend coupling medication schedule with daily routine, using notes, daily checklists, calendars or other reminders; make tasks easier and limit tasks that are too hard; allow extra time; monitor, supervise or help with complex tasks.                                              TIME ADMINISTERING TEST: 50    TIME FOR INTERPRETATION AND PREPARATION OF REPORT: 25    TOTAL TIME: 75      CPT Levels Reference:    Patient's Average CPT Score:  5.0                                                                                                                                                  Individual scores range along a continuum as outlined below.  In addition to cognitive status, other factors may affect safety in a home environment.  Please refer to specific recommendations for this patient.    ___5.6-5.8  Normal functioning (absence of cognitive-functional disability).  Independent in managing personal affairs, monitors and directs own behavior.  Uses complex information to carry out daily activities with safety and accuracy.    Proficient with instrumental activities of daily living (IADL) and learning new  "activity.  Problems are anticipated, errors are avoided, and consequences of actions are considered.      _x__5.0   Mild cognitive-functional disability; deficits in working memory and executive thought processes. Difficulty using complex information. Problems may be observed with recent memory, judgment, reasoning and planning ahead. May be impulsive or have difficulty anticipating consequences.  Safety:  May require assistance to plan ahead; or to manage complex medication schedules, appointments or finances.  Hazardous activities may need to be monitored or limited.  ADL:  Mild functional decline.  Able to complete basic self-care and routine household tasks.  May have difficulty with complex daily tasks such as reading, writing, meal preparation, shopping or driving.   Learns through hands on teaching. Self-centered behavior or difficulty considering the needs of others may be seen related to trouble seeing the  whole picture\". Can appear disorganized or uninhibited.    ___4.5  Mild to moderate cognitive-functional disability. Significant deficits in working memory and executive thought processes. Judgment, reasoning and planning show obvious impairment.  Distractible with inability to shift attention/actions given competing stimuli.  Difficulty with problem solving and managing details. Complex daily tasks performed with inconsistency, difficulty, or error.     Safety:  Medications should be monitored, stove use may require supervision, and driving ability may be affected.  Impaired safety awareness with inability to anticipate potential problems.  May not recognize or respond to emergent situations. Requires frequent check-in support.   ADL:  Mild difficulty with simple everyday self-care tasks. Benefits from structured, routine activity.  Will likely need reminders to complete tasks outside of the routine. Requires assistance with planning and IADL tasks like shopping and finances. Learns concrete tasks " through repetition, but performance may not generalize. Tends to be impulsive with poor insight. Self centered behavior or inability to consider the needs of others is common.    ___4.0  Moderate cognitive-functional disability; abstract to concrete thought processes. Working memory and executive function impairments are obvious. Difficulty with planning and problem solving.  Behavior is goal-directed, but unable to follow multi-step directions, is easily distracted, and may not recognize mistakes.  Inability to anticipate hazards or understand precautions.  Safety:  Recommend 24-hour supervision for safety. Supervision needed for medication management and for hazardous activities. May not be able to follow a restricted diet. Can get lost in unfamiliar surroundings. Generally, persons functioning at level 4 should not be driving.   ADL:  Some decline in quality or frequency of ADL.  [place] enhanced by use of a routine, simple concrete directions, and caregiver set-up of needed items. Complex tasks such as money or home management typically requires assistance.  Relies heavily on vision to guide behavior; will ignore objects/hazards not in plain sight and can be distracted by irrelevant objects. Often has poor insight.  Able to carry out social conversation and may verbally  cover  for deficits leading caregivers to believe they are capable of functioning independently.       ___3.5  Moderate cognitive-functional disability; increased cues needed for task completion. Aware of concrete task steps but needs prompting or cues to initiate and complete simple tasks. Attention span is limited, simple directions may need to be repeated, and re-focus to a topic or task may be required.  Safety:  24-hour supervision required for safety and for assistance with daily tasks. Assistance required with medications, and access to medication should be limited. Meals, nutrition and dietary restrictions need to be monitored.  All  hazardous activities should be restricted or supervised. Should not drive. Prone to wandering and can become lost.  ADL:  Moderate functional decline. Familiar tasks usually requires set-up of supplies and directions to complete steps. May need objects handed to them for task initiation. Function best with a set schedule in familiar surroundings with familiar people. All complex tasks must be done by others. Vocabulary is diminished and speech often unfocused.           [facility]                                                                                   OUTPATIENT OCCUPATIONAL THERAPY    PLAN OF TREATMENT FOR OUTPATIENT REHABILITATION   Patient's Last Name, First Name, Loy Grider YOB: 1955   Provider's Name   [facility]   Medical Record No.  [id number]     Onset Date: 02/07/24 (order date) Start of Care Date: 04/12/24     Medical Diagnosis:  Memory loss (R41.3)  - Primary      OT Treatment Diagnosis:  memory change Plan of Treatment  Frequency/Duration:1 visit/1 visit    Certification date from 04/12/24   To 04/12/24        See note for plan of treatment details and functional goals     Effie Little, OTR/L, CNS, CSRS                         I CERTIFY THE NEED FOR THESE SERVICES FURNISHED UNDER        THIS PLAN OF TREATMENT AND WHILE UNDER MY CARE     (Physician attestation of this document indicates review and certification of the therapy plan).              Referring Provider:  Blaze Kaminski    Initial Assessment  See Epic Evaluation- 04/12/24

## 2024-04-13 ENCOUNTER — APPOINTMENT (OUTPATIENT)
Dept: ULTRASOUND IMAGING | Facility: CLINIC | Age: 69
DRG: 871 | End: 2024-04-13
Payer: COMMERCIAL

## 2024-04-13 ENCOUNTER — APPOINTMENT (OUTPATIENT)
Dept: CARDIOLOGY | Facility: CLINIC | Age: 69
DRG: 871 | End: 2024-04-13
Payer: COMMERCIAL

## 2024-04-13 LAB
ALBUMIN SERPL BCG-MCNC: 3.1 G/DL (ref 3.5–5.2)
ALP SERPL-CCNC: 182 U/L (ref 40–150)
ALT SERPL W P-5'-P-CCNC: 25 U/L (ref 0–70)
ANION GAP SERPL CALCULATED.3IONS-SCNC: 8 MMOL/L (ref 7–15)
AST SERPL W P-5'-P-CCNC: 30 U/L (ref 0–45)
ATRIAL RATE - MUSE: 64 BPM
ATRIAL RATE - MUSE: 92 BPM
BASOPHILS # BLD AUTO: 0 10E3/UL (ref 0–0.2)
BASOPHILS NFR BLD AUTO: 0 %
BILIRUB SERPL-MCNC: 0.6 MG/DL
BUN SERPL-MCNC: 22.3 MG/DL (ref 8–23)
C PNEUM DNA SPEC QL NAA+PROBE: NOT DETECTED
CALCIUM SERPL-MCNC: 7.9 MG/DL (ref 8.8–10.2)
CHLORIDE SERPL-SCNC: 111 MMOL/L (ref 98–107)
CREAT SERPL-MCNC: 1.2 MG/DL (ref 0.67–1.17)
CRP SERPL-MCNC: 20.4 MG/L
DEPRECATED HCO3 PLAS-SCNC: 20 MMOL/L (ref 22–29)
DIASTOLIC BLOOD PRESSURE - MUSE: NORMAL MMHG
DIASTOLIC BLOOD PRESSURE - MUSE: NORMAL MMHG
EGFRCR SERPLBLD CKD-EPI 2021: 65 ML/MIN/1.73M2
EOSINOPHIL # BLD AUTO: 0 10E3/UL (ref 0–0.7)
EOSINOPHIL NFR BLD AUTO: 0 %
ERYTHROCYTE [DISTWIDTH] IN BLOOD BY AUTOMATED COUNT: 14.3 % (ref 10–15)
FLUAV H1 2009 PAND RNA SPEC QL NAA+PROBE: NOT DETECTED
FLUAV H1 RNA SPEC QL NAA+PROBE: NOT DETECTED
FLUAV H3 RNA SPEC QL NAA+PROBE: NOT DETECTED
FLUAV RNA SPEC QL NAA+PROBE: NEGATIVE
FLUAV RNA SPEC QL NAA+PROBE: NOT DETECTED
FLUBV RNA RESP QL NAA+PROBE: NEGATIVE
FLUBV RNA SPEC QL NAA+PROBE: NOT DETECTED
GLUCOSE BLDC GLUCOMTR-MCNC: 142 MG/DL (ref 70–99)
GLUCOSE BLDC GLUCOMTR-MCNC: 168 MG/DL (ref 70–99)
GLUCOSE BLDC GLUCOMTR-MCNC: 182 MG/DL (ref 70–99)
GLUCOSE BLDC GLUCOMTR-MCNC: 234 MG/DL (ref 70–99)
GLUCOSE SERPL-MCNC: 160 MG/DL (ref 70–99)
HADV DNA SPEC QL NAA+PROBE: NOT DETECTED
HCOV PNL SPEC NAA+PROBE: NOT DETECTED
HCT VFR BLD AUTO: 37.4 % (ref 40–53)
HGB BLD-MCNC: 11.8 G/DL (ref 13.3–17.7)
HMPV RNA SPEC QL NAA+PROBE: NOT DETECTED
HOLD SPECIMEN: NORMAL
HOLD SPECIMEN: NORMAL
HPIV1 RNA SPEC QL NAA+PROBE: NOT DETECTED
HPIV2 RNA SPEC QL NAA+PROBE: NOT DETECTED
HPIV3 RNA SPEC QL NAA+PROBE: NOT DETECTED
HPIV4 RNA SPEC QL NAA+PROBE: NOT DETECTED
IMM GRANULOCYTES # BLD: 0 10E3/UL
IMM GRANULOCYTES NFR BLD: 0 %
INR PPP: 1.21 (ref 0.85–1.15)
INTERPRETATION ECG - MUSE: NORMAL
INTERPRETATION ECG - MUSE: NORMAL
L PNEUMO1 AG UR QL IA: NEGATIVE
LACTATE SERPL-SCNC: 1.3 MMOL/L (ref 0.7–2)
LIPASE SERPL-CCNC: 19 U/L (ref 13–60)
LVEF ECHO: NORMAL
LYMPHOCYTES # BLD AUTO: 1.3 10E3/UL (ref 0.8–5.3)
LYMPHOCYTES NFR BLD AUTO: 15 %
M PNEUMO DNA SPEC QL NAA+PROBE: NOT DETECTED
MCH RBC QN AUTO: 27.6 PG (ref 26.5–33)
MCHC RBC AUTO-ENTMCNC: 31.6 G/DL (ref 31.5–36.5)
MCV RBC AUTO: 87 FL (ref 78–100)
MONOCYTES # BLD AUTO: 0.7 10E3/UL (ref 0–1.3)
MONOCYTES NFR BLD AUTO: 8 %
MRSA DNA SPEC QL NAA+PROBE: NEGATIVE
NEUTROPHILS # BLD AUTO: 7.1 10E3/UL (ref 1.6–8.3)
NEUTROPHILS NFR BLD AUTO: 77 %
NRBC # BLD AUTO: 0 10E3/UL
NRBC BLD AUTO-RTO: 0 /100
P AXIS - MUSE: NORMAL DEGREES
P AXIS - MUSE: NORMAL DEGREES
PLATELET # BLD AUTO: 183 10E3/UL (ref 150–450)
POTASSIUM SERPL-SCNC: 4.3 MMOL/L (ref 3.4–5.3)
PR INTERVAL - MUSE: NORMAL MS
PR INTERVAL - MUSE: NORMAL MS
PROT SERPL-MCNC: 5.6 G/DL (ref 6.4–8.3)
QRS DURATION - MUSE: 86 MS
QRS DURATION - MUSE: 96 MS
QT - MUSE: 406 MS
QT - MUSE: 422 MS
QTC - MUSE: 479 MS
QTC - MUSE: 502 MS
R AXIS - MUSE: -14 DEGREES
R AXIS - MUSE: -7 DEGREES
RBC # BLD AUTO: 4.28 10E6/UL (ref 4.4–5.9)
RSV RNA SPEC NAA+PROBE: NEGATIVE
RSV RNA SPEC QL NAA+PROBE: NOT DETECTED
RSV RNA SPEC QL NAA+PROBE: NOT DETECTED
RV+EV RNA SPEC QL NAA+PROBE: NOT DETECTED
S PNEUM AG SPEC QL: NEGATIVE
SA TARGET DNA: NEGATIVE
SARS-COV-2 RNA RESP QL NAA+PROBE: NEGATIVE
SODIUM SERPL-SCNC: 139 MMOL/L (ref 135–145)
SYSTOLIC BLOOD PRESSURE - MUSE: NORMAL MMHG
SYSTOLIC BLOOD PRESSURE - MUSE: NORMAL MMHG
T AXIS - MUSE: 148 DEGREES
T AXIS - MUSE: 154 DEGREES
TROPONIN T SERPL HS-MCNC: 120 NG/L
TROPONIN T SERPL HS-MCNC: 131 NG/L
TROPONIN T SERPL HS-MCNC: 158 NG/L
TROPONIN T SERPL HS-MCNC: 59 NG/L
TSH SERPL DL<=0.005 MIU/L-ACNC: 0.32 UIU/ML (ref 0.3–4.2)
VENTRICULAR RATE- MUSE: 84 BPM
VENTRICULAR RATE- MUSE: 85 BPM
WBC # BLD AUTO: 9.1 10E3/UL (ref 4–11)

## 2024-04-13 PROCEDURE — 87641 MR-STAPH DNA AMP PROBE: CPT

## 2024-04-13 PROCEDURE — 36415 COLL VENOUS BLD VENIPUNCTURE: CPT | Performed by: EMERGENCY MEDICINE

## 2024-04-13 PROCEDURE — 83690 ASSAY OF LIPASE: CPT

## 2024-04-13 PROCEDURE — 99254 IP/OBS CNSLTJ NEW/EST MOD 60: CPT | Mod: 25 | Performed by: INTERNAL MEDICINE

## 2024-04-13 PROCEDURE — 250N000013 HC RX MED GY IP 250 OP 250 PS 637: Performed by: STUDENT IN AN ORGANIZED HEALTH CARE EDUCATION/TRAINING PROGRAM

## 2024-04-13 PROCEDURE — 250N000011 HC RX IP 250 OP 636: Mod: JZ

## 2024-04-13 PROCEDURE — 93975 VASCULAR STUDY: CPT | Mod: 26 | Performed by: RADIOLOGY

## 2024-04-13 PROCEDURE — 258N000003 HC RX IP 258 OP 636

## 2024-04-13 PROCEDURE — 250N000012 HC RX MED GY IP 250 OP 636 PS 637

## 2024-04-13 PROCEDURE — 84484 ASSAY OF TROPONIN QUANT: CPT | Performed by: EMERGENCY MEDICINE

## 2024-04-13 PROCEDURE — 76700 US EXAM ABDOM COMPLETE: CPT | Mod: 26 | Performed by: RADIOLOGY

## 2024-04-13 PROCEDURE — 93356 MYOCRD STRAIN IMG SPCKL TRCK: CPT | Performed by: INTERNAL MEDICINE

## 2024-04-13 PROCEDURE — 120N000005 HC R&B MS OVERFLOW UMMC

## 2024-04-13 PROCEDURE — 250N000013 HC RX MED GY IP 250 OP 250 PS 637: Performed by: INTERNAL MEDICINE

## 2024-04-13 PROCEDURE — 99207 PR NO BILLABLE SERVICE THIS VISIT: CPT | Performed by: STUDENT IN AN ORGANIZED HEALTH CARE EDUCATION/TRAINING PROGRAM

## 2024-04-13 PROCEDURE — 36415 COLL VENOUS BLD VENIPUNCTURE: CPT

## 2024-04-13 PROCEDURE — 84443 ASSAY THYROID STIM HORMONE: CPT

## 2024-04-13 PROCEDURE — 36415 COLL VENOUS BLD VENIPUNCTURE: CPT | Performed by: STUDENT IN AN ORGANIZED HEALTH CARE EDUCATION/TRAINING PROGRAM

## 2024-04-13 PROCEDURE — 86900 BLOOD TYPING SEROLOGIC ABO: CPT | Performed by: EMERGENCY MEDICINE

## 2024-04-13 PROCEDURE — 82962 GLUCOSE BLOOD TEST: CPT

## 2024-04-13 PROCEDURE — 93306 TTE W/DOPPLER COMPLETE: CPT | Mod: 26 | Performed by: INTERNAL MEDICINE

## 2024-04-13 PROCEDURE — 250N000013 HC RX MED GY IP 250 OP 250 PS 637

## 2024-04-13 PROCEDURE — 84484 ASSAY OF TROPONIN QUANT: CPT

## 2024-04-13 PROCEDURE — 84484 ASSAY OF TROPONIN QUANT: CPT | Performed by: STUDENT IN AN ORGANIZED HEALTH CARE EDUCATION/TRAINING PROGRAM

## 2024-04-13 PROCEDURE — 76700 US EXAM ABDOM COMPLETE: CPT

## 2024-04-13 PROCEDURE — 83605 ASSAY OF LACTIC ACID: CPT | Performed by: EMERGENCY MEDICINE

## 2024-04-13 PROCEDURE — 80053 COMPREHEN METABOLIC PANEL: CPT

## 2024-04-13 PROCEDURE — 93306 TTE W/DOPPLER COMPLETE: CPT

## 2024-04-13 PROCEDURE — 85025 COMPLETE CBC W/AUTO DIFF WBC: CPT

## 2024-04-13 PROCEDURE — 99223 1ST HOSP IP/OBS HIGH 75: CPT | Mod: GC | Performed by: STUDENT IN AN ORGANIZED HEALTH CARE EDUCATION/TRAINING PROGRAM

## 2024-04-13 PROCEDURE — 86140 C-REACTIVE PROTEIN: CPT

## 2024-04-13 PROCEDURE — 85610 PROTHROMBIN TIME: CPT

## 2024-04-13 PROCEDURE — 87640 STAPH A DNA AMP PROBE: CPT

## 2024-04-13 RX ORDER — ASPIRIN 81 MG/1
81 TABLET, CHEWABLE ORAL DAILY
Status: ON HOLD | COMMUNITY
Start: 2024-03-11 | End: 2024-04-17

## 2024-04-13 RX ORDER — DEXTROSE MONOHYDRATE 25 G/50ML
25-50 INJECTION, SOLUTION INTRAVENOUS
Status: DISCONTINUED | OUTPATIENT
Start: 2024-04-13 | End: 2024-04-17 | Stop reason: HOSPADM

## 2024-04-13 RX ORDER — PIPERACILLIN SODIUM, TAZOBACTAM SODIUM 3; .375 G/15ML; G/15ML
3.38 INJECTION, POWDER, LYOPHILIZED, FOR SOLUTION INTRAVENOUS EVERY 6 HOURS
Status: DISCONTINUED | OUTPATIENT
Start: 2024-04-13 | End: 2024-04-13

## 2024-04-13 RX ORDER — LEVOTHYROXINE SODIUM 150 UG/1
150 TABLET ORAL
Status: DISCONTINUED | OUTPATIENT
Start: 2024-04-13 | End: 2024-04-17 | Stop reason: HOSPADM

## 2024-04-13 RX ORDER — METHOCARBAMOL 500 MG/1
500 TABLET, FILM COATED ORAL 4 TIMES DAILY PRN
COMMUNITY
Start: 2024-03-11

## 2024-04-13 RX ORDER — ATORVASTATIN CALCIUM 40 MG/1
40 TABLET, FILM COATED ORAL EVERY EVENING
Status: DISCONTINUED | OUTPATIENT
Start: 2024-04-13 | End: 2024-04-17 | Stop reason: HOSPADM

## 2024-04-13 RX ORDER — METFORMIN HCL 500 MG
1000 TABLET, EXTENDED RELEASE 24 HR ORAL 2 TIMES DAILY WITH MEALS
COMMUNITY
Start: 2024-01-26 | End: 2024-10-02

## 2024-04-13 RX ORDER — DILTIAZEM HYDROCHLORIDE 120 MG/1
120 CAPSULE, COATED, EXTENDED RELEASE ORAL DAILY
Status: DISCONTINUED | OUTPATIENT
Start: 2024-04-14 | End: 2024-04-17 | Stop reason: HOSPADM

## 2024-04-13 RX ORDER — ASPIRIN 81 MG/1
81 TABLET, CHEWABLE ORAL DAILY
Status: DISCONTINUED | OUTPATIENT
Start: 2024-04-13 | End: 2024-04-17 | Stop reason: HOSPADM

## 2024-04-13 RX ORDER — LEVOTHYROXINE SODIUM 150 UG/1
150 TABLET ORAL DAILY
COMMUNITY
Start: 2024-01-26 | End: 2024-04-25

## 2024-04-13 RX ORDER — AMLODIPINE BESYLATE 10 MG/1
10 TABLET ORAL DAILY
Status: ON HOLD | COMMUNITY
Start: 2024-01-26 | End: 2024-04-17

## 2024-04-13 RX ORDER — CHOLECALCIFEROL (VITAMIN D3) 25 MCG
2 TABLET ORAL
COMMUNITY
Start: 2024-03-11 | End: 2024-06-04

## 2024-04-13 RX ORDER — MYCOPHENOLATE MOFETIL 500 MG/1
500 TABLET ORAL
Status: DISCONTINUED | OUTPATIENT
Start: 2024-04-13 | End: 2024-04-17 | Stop reason: HOSPADM

## 2024-04-13 RX ORDER — MYCOPHENOLATE MOFETIL 500 MG/1
500 TABLET ORAL 2 TIMES DAILY
COMMUNITY
End: 2024-08-15

## 2024-04-13 RX ORDER — AMOXICILLIN 250 MG
1 CAPSULE ORAL 2 TIMES DAILY PRN
Status: DISCONTINUED | OUTPATIENT
Start: 2024-04-13 | End: 2024-04-17 | Stop reason: HOSPADM

## 2024-04-13 RX ORDER — HYDRALAZINE HYDROCHLORIDE 25 MG/1
25 TABLET, FILM COATED ORAL DAILY
Status: ON HOLD | COMMUNITY
Start: 2024-01-29 | End: 2024-04-17

## 2024-04-13 RX ORDER — INSULIN GLARGINE 100 [IU]/ML
INJECTION, SOLUTION SUBCUTANEOUS
Status: ON HOLD | COMMUNITY
Start: 2024-03-11 | End: 2024-04-17

## 2024-04-13 RX ORDER — NICOTINE POLACRILEX 4 MG
15-30 LOZENGE BUCCAL
Status: DISCONTINUED | OUTPATIENT
Start: 2024-04-13 | End: 2024-04-17 | Stop reason: HOSPADM

## 2024-04-13 RX ORDER — PREDNISONE 5 MG/1
5 TABLET ORAL DAILY
Status: DISCONTINUED | OUTPATIENT
Start: 2024-04-13 | End: 2024-04-17 | Stop reason: HOSPADM

## 2024-04-13 RX ORDER — PREDNISONE 5 MG/1
5 TABLET ORAL DAILY
COMMUNITY
Start: 2024-01-26 | End: 2024-06-04

## 2024-04-13 RX ORDER — URSODIOL 250 MG/1
250 TABLET, FILM COATED ORAL 2 TIMES DAILY
COMMUNITY
Start: 2024-01-26 | End: 2024-04-18

## 2024-04-13 RX ORDER — POLYETHYLENE GLYCOL 3350 17 G/17G
17 POWDER, FOR SOLUTION ORAL 2 TIMES DAILY PRN
Status: DISCONTINUED | OUTPATIENT
Start: 2024-04-13 | End: 2024-04-17 | Stop reason: HOSPADM

## 2024-04-13 RX ORDER — METOPROLOL TARTRATE 25 MG/1
25 TABLET, FILM COATED ORAL 2 TIMES DAILY
Status: ON HOLD | COMMUNITY
Start: 2024-03-11 | End: 2024-04-17

## 2024-04-13 RX ORDER — AMOXICILLIN 250 MG
2 CAPSULE ORAL 2 TIMES DAILY PRN
Status: DISCONTINUED | OUTPATIENT
Start: 2024-04-13 | End: 2024-04-17 | Stop reason: HOSPADM

## 2024-04-13 RX ORDER — LIDOCAINE 40 MG/G
CREAM TOPICAL
Status: DISCONTINUED | OUTPATIENT
Start: 2024-04-13 | End: 2024-04-17 | Stop reason: HOSPADM

## 2024-04-13 RX ORDER — ATORVASTATIN CALCIUM 40 MG/1
40 TABLET, FILM COATED ORAL DAILY
COMMUNITY
Start: 2024-01-26 | End: 2024-06-04

## 2024-04-13 RX ADMIN — MYCOPHENOLATE MOFETIL 500 MG: 500 TABLET, FILM COATED ORAL at 08:06

## 2024-04-13 RX ADMIN — MEROPENEM 2 G: 1 INJECTION, POWDER, FOR SOLUTION INTRAVENOUS at 13:00

## 2024-04-13 RX ADMIN — MEROPENEM 2 G: 1 INJECTION, POWDER, FOR SOLUTION INTRAVENOUS at 06:29

## 2024-04-13 RX ADMIN — MYCOPHENOLATE MOFETIL 500 MG: 500 TABLET, FILM COATED ORAL at 17:55

## 2024-04-13 RX ADMIN — Medication 12.5 MG: at 20:28

## 2024-04-13 RX ADMIN — Medication 12.5 MG: at 08:07

## 2024-04-13 RX ADMIN — ASPIRIN 81 MG CHEWABLE TABLET 81 MG: 81 TABLET CHEWABLE at 08:06

## 2024-04-13 RX ADMIN — ATORVASTATIN CALCIUM 40 MG: 40 TABLET, FILM COATED ORAL at 20:46

## 2024-04-13 RX ADMIN — INSULIN ASPART 2 UNITS: 100 INJECTION, SOLUTION INTRAVENOUS; SUBCUTANEOUS at 12:58

## 2024-04-13 RX ADMIN — INSULIN ASPART 1 UNITS: 100 INJECTION, SOLUTION INTRAVENOUS; SUBCUTANEOUS at 08:10

## 2024-04-13 RX ADMIN — LEVOTHYROXINE SODIUM 150 MCG: 0.15 TABLET ORAL at 08:06

## 2024-04-13 RX ADMIN — INSULIN ASPART 1 UNITS: 100 INJECTION, SOLUTION INTRAVENOUS; SUBCUTANEOUS at 17:54

## 2024-04-13 RX ADMIN — APIXABAN 5 MG: 5 TABLET, FILM COATED ORAL at 20:46

## 2024-04-13 RX ADMIN — PREDNISONE 5 MG: 5 TABLET ORAL at 08:06

## 2024-04-13 RX ADMIN — MEROPENEM 2 G: 1 INJECTION, POWDER, FOR SOLUTION INTRAVENOUS at 20:46

## 2024-04-13 NOTE — H&P
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 He was admitted on 4/12/2024 with sepsis and afib with RVR.    # Severe sepsis- improved  # History of ESBL urinary tract infections  Admitted 4/12/2024 with acute onset of rigors and vomiting and found to be septic, concerning for Gram negative bacteremia, especially in the setting of recent admission for ESBL E. Coli pyelonephritis. Urine and blood cultures obtained in ED and he was started on broad antibiotics (vanc IV and Zosyn). Upon admission broadened to meropenem given recent hospitalization and ESBL history- can de-escalate pending culture data. At this time, differential includes UTI given recent UTIs- will await urine culture result and also await abdominal ultrasound to evaluate kidneys for possible pyelonephritis. Will also want to look for biliary pathology and ascites as potential sources of infection, though less likely given benign abdominal exam and normal AST, ALT, and bili (alk phos is elevated to 231 but it has been elevated before too). Lipase wnl. Lung pathology less likely as he does not have new respiratory symptoms and recent Chest CT 3/22/2024 showed improvement in bilateral opacities seen on prior admission. Additionally, bronch during prior admission revealed negative infectious workup. CXR this admission did show mild interstitial prominence in right mid and lower lung zone and left lower lung zone, but could represent the changes already known on prior CT scans. When I saw the patient in the ED, he was feeling much improved with good mental status and stable vitals, which is reassuring.   - Abx:    - meropenem  (4/13 - P)   - vancomycin IV (4/12 - P)   - Zosyn (4/12)  - Micro:    - sputum culture ordered   - MRSA nares ordered   - 4/12 COVID/flu/RSV- negative   - 4/12 respiratory viral panel- negative   - 4/12 urine culture- in process   - 4/12 strep pneumo urine antigen- negative   - 4/12 legionella urine antigen- negative   - 4/12 blood cultures x2- in process  - abdominal ultrasound w/ Doppler to evaluate for infectious sources     # Afib with RVR- improved  Admitted with afib RVR with rates to 160s, improved rates with diltiazem gtt in the ED. Likely secondary to sepsis. Of note, during previous hospital stay he was discharged with metoprolol tartrate so will resume that and stop diltiazem gtt.   - resume pta metoprolol tartrate 12.5mg BID   - stop diltiazem gtt   - not on anticoagulation pta because of hemoptysis during previous admission. Anticoagulation can be considered this admission.   - TTE ordered    Most recent TTE from 3/3/2024:   Left ventricular function is normal.The ejection fraction is 60-65%.  Global right ventricular function is normal.  The inferior vena cava is normal.    # DEREK  Creatinine 1.29 on admission from baseline of ~0.8. Likely in setting of sepsis and afib RVR.   - abdominal US above will comment on kidneys   - CTM    # Elevated troponin  Likely demand in the setting of afib RVR. No chest pain.    - trend troponin to peak.     # Dispo planning  - PT/OT consults      ____________________________  Chronic/stable/improved:     # Hypothyroidism  3/2/2024 TSH mildly elevated at 5.47, free T4 wnl at 1.24. 4/13/2024 TSH 0.32.   - continue levothyroxine 150mcg daily    # Cirrhosis related to alcohol c/b HCC s/p s/p DDLT (2018)  # Hx of biliary stricture requiring stent, last ERCP 2019  - Continue pta mycophenolate 500mg BID  - Continue pta prednisone 5mg daily   - Continue pta ursodiol 250mg BID    # HTN  - holding pta amlodipine 10mg daily in the setting of sepsis  - holding pta hydralazine 25mg  "BID in the setting of sepsis    # CAD s/p PCI to mLAD   - continue pta atorvastatin 40mg daily  - resume pta aspirin 81mg daily (patient was not taking at home, but should be)    # Type 2 diabetes  2/6/2024 A1c 9.2%.   - holding pta glargine insulin (written as 28 units, but patient reports taking 20 units only occasionally depending on his blood sugars)  - holding pta metformin 1000mg BID  - MDSSI  - hypoglycemia protocol                  Diet: Low Saturated Fat Na <2400 mg  DVT Prophylaxis: Pneumatic Compression Devices  Cronin Catheter: Not present  Fluids: n/a  Lines: None     Cardiac Monitoring: None  Code Status: Full Code    Clinically Significant Risk Factors Present on Admission                                  Disposition Plan      Expected Discharge Date: 04/14/2024                  The patient will be formally staffed in the AM.      Bernabe Rojas MD  Medicine Service, [facility]  Securely message with [employer] (more info)  Text page via [facility] Paging/Directory   See signed in provider for up to date coverage information  ______________________________________________________________________    Chief Complaint   \"I couldn't walk... 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 Georgian phone interpretor used for interview. He has another chart in Epic where additional information was obtained. Spouse at bedside.     Briefly, he was recently admitted 2/28 - 3/9/2024 with bilateral pyelonephritis with ESBL E. Coli treated with ertapenem to complete a 14-day course of antibiotics (3/1 - 3/14/2024). He also had afib RVR and was started on " metoprolol tartrate 12.5mg BID as well as apixaban, but apixaban was stopped prior to discharge because of hemoptysis. He did undergo bronchoscopy 3/5/2024 without infectious/malignant etiology of hemoptysis. Also had negative AFB sputum x3 to rule out TB. He did complete course of antibiotics for community-acquired pneumonia given Chest CT showed Bilateral perihilar predominant ground glass opacities.     He did see his PCP 3/20/2024 and had a UA that showed 10,000-50,000 CFU/mL ESBL E. Coli. He was prescribed a 7-day course of Bactrim 3/22/2024, and he reports completing the course.     On 3/22/2024 he did have repeat chest imaging that showed Decreased perihilar predominant interstitial and  groundglass opacities, consistent with resolving infection/inflammation versus alveolar hemorrhage.     Talking with him in the ED, he reports that he was at home yesterday afternoon when his body all of a sudden started to fell stiff and strange, he couldn't even walk. He was also shaking. Around at 5pm (4/12/2024) and he started vomiting. He says that he was recently admitted for infection and he thinks that his infection was not fully treated. He was not on any oral antibiotics at home. Before he started vomiting he was, feeling well, he even went to work.     He was BIBA, he says he was hardly able to walk.     He is not able to empty is bladder properly, this has been new for about 1 week.     ED course:   - initial vitals were temperature 102.6F,  afib, /101, RR 16, satting well on room air  - CBC wnl, procal elevated mildly at 0.55, lactic acid mildly elevated at 2.1, VBG 7.36/37, UA with 79 WBC and small LE, troponin 25 -> 59  - diltiazem 15mg IV followed by diltiazem gtt  - Zosyn IV  - 1L NS  - vancomycin IV    Currently when seen in the ED he is feeling much better.     ROS: Currently, no fevers, chills, headache, vision changes, sore throat, runny nose, coughing, shortness of breath, chest pain,  abdominal pain. No falls at home.     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    Past Surgical History   No past surgical history on file.    Prior to Admission Medications   None           Physical Exam   Vital Signs: Temp: (!) 102.6  F (39.2  C) Temp src: Axillary BP: 114/56 Pulse: 73   Resp: 16 SpO2: 97 % O2 Device: None (Room air)    Weight: 0 lbs 0 oz    General: lying in bed, NAD, appears comfortable, not diaphoretic   HEENT: no scleral icterus or conjunctival injection, oropharynx clear  Resp: clear to auscultation bilaterally, no crackles or wheezes  Cardiac: regular rate and irregular rhythm, 2/6 systolic murmur  Abdomen: soft, non-tender, non-distended. No rebound or guarding.   Extremities: warm, 1-2+ lower extremity pitting edema bilaterally  Skin: no acute lesions on exposed skin, no jaundice  Neuro: alert, oriented x4, answers questions appropriately. Able to lift each lower extremity off the bed to command.   Psych: appropriate mood and affect     Medical Decision Making             Data

## 2024-04-13 NOTE — PHARMACY-VANCOMYCIN DOSING SERVICE
Pharmacy Vancomycin Initial Note  Date of Service 2024  Patient's  1955  68 year old, male    Indication: Sepsis    Current estimated CrCl = CrCl cannot be calculated (Unknown ideal weight.).    Creatinine for last 3 days  2024:  9:17 PM Creatinine 1.29 mg/dL    Recent Vancomycin Level(s) for last 3 days  No results found for requested labs within last 3 days.      Vancomycin IV Administrations (past 72 hours)                     vancomycin (VANCOCIN) 1,750 mg in sodium chloride 0.9 % 500 mL intermittent infusion (mg) 1,750 mg New Bag 24                    Nephrotoxins and other renal medications (From now, onward)      Start     Dose/Rate Route Frequency Ordered Stop    24 220  vancomycin (VANCOCIN) 1,500 mg in 0.9% NaCl 250 mL intermittent infusion         1,500 mg  over 90 Minutes Intravenous EVERY 24 HOURS 24 220  vancomycin (VANCOCIN) 1,750 mg in sodium chloride 0.9 % 500 mL intermittent infusion         1,750 mg  over 120 Minutes Intravenous ONCE 24              Contrast Orders - past 72 hours (72h ago, onward)      None            [employer] Prediction of Planned Initial Vancomycin Regimen  Loading dose: 1750 mg x1  Regimen: 1500 mg IV every 24 hours.  Start time: 23:07 on 2024  Exposure target: AUC24 (range)400-600 mg/L.hr   AUC24,ss: 527 mg/L.hr  Probability of AUC24 > 400: 78 %  Ctrough,ss: 15.7 mg/L  Probability of Ctrough,ss > 20: 30 %  Probability of nephrotoxicity (Lodise ALYSSA ): 11 %          Plan:  Load vanco 1750 mg x1. Then start vancomycin  1500 mg IV q24h. Low threshold for intermittent dosing by levels.  Vancomycin monitoring method: AUC  Vancomycin therapeutic monitoring goal: 400-600 mg*h/L  Pharmacy will check vancomycin levels as appropriate in 1-3 Days.    Serum creatinine levels will be ordered daily for the first week of therapy and at least twice weekly for subsequent weeks.      Willa Riley, PharmD,  BCEMP, BCPS

## 2024-04-13 NOTE — TELEPHONE ENCOUNTER
Kaylene patient daughter calling. She arrived to patient's home at 6:30 pm and just before 7 pm patient started shaking. Patient went to bed but has continued to shake. It is full body severe shivering. Patient does not feel like he is running a fever. Patient recently treated in the hospital for an infection. Patient is a liver transplant patient.   Patient shaking to hard to speak or check his blood sugar.   Patient having some weakness as well.   Protocol recommends ED now  Daughter agrees to bring to ED   Frances Tinoco RN   04/12/24 8:09 PM  [facility] Nurse Advisor    Reason for Disposition   [1] New-onset muscle jerks (twitches, spasms) AND [2] present now    Additional Information   Negative: Difficult to awaken or acting confused (e.g., disoriented, slurred speech)   Negative: Sounds like a life-threatening emergency to the triager   Negative: Abnormal twitch, blinking, tic, or spasm of eyelid(s)   Negative: Sounds like a seizure (generalized or focal)   Negative: Suspected alcohol withdrawal   Negative: Suspected substance use (drug use), addiction, or withdrawal   Negative: [1] Shivering or chills AND [2] fever   Negative: [1] Shivering or chills AND [2] cold exposure (R/O hypothermia)   Negative: Face, arm, or leg weakness   Negative: [1] Muscle rigidity or tightness AND [2] present now    Protocols used: Muscle Jerks - Tics - [id number]

## 2024-04-13 NOTE — ED PROVIDER NOTES
ED PROVIDER NOTE  April 12, 2024  History     Chief Complaint   Patient presents with    Altered Mental Status     HPI  Nava Limon is a 68 year old male who has a self-reported history of liver transplantation in 2017 with otherwise no known past medical history per patient or chart review.  He arrives today to the emergency department as a level read patient secondary to altered mentation and rapid heart rate.  EMS was called per family with note of rapid onset of nausea, vomiting, profuse diaphoresis.  On scene medics did obtain a blood sugar noted to be in the 100s.  They placed patient on telemetry noting a rhythm consistent with atrial fibrillation with RVR.  Did report family noted some confusion with no obvious focal neurologic deficit.  Upon arrival patient reports no headache.  He reports no change in vision, difficulty speaking or unilateral change in strength or sensation.  He reports no recent fever, chills, nasal congestion or rhinorrhea.  He reports no cough or shortness of breath.  Patient reports no chest pain.  He denies abdominal pain or GI loss such as diarrhea.  Did have a single episode of nonbilious nonbloody emesis.  No new rash.  No recent medication change.  No dysuria, urinary frequency, abdominal distention.  Patient reports no other contributing history at this time.      Past Medical History  No past medical history on file.  No past surgical history on file.  No current outpatient medications on file.    No Known Allergies  Family History  No family history on file.  Social History          A medically appropriate review of systems was performed with pertinent positives and negatives noted in the HPI, and all other systems negative.      Physical Exam   BP: (!) 180/101  Pulse: (!) 151  Temp: (!) 102.6  F (39.2  C)  Resp: 16  SpO2: (!) 81 %      Physical Exam  Vitals and nursing note reviewed.   Constitutional:       General: He is in acute distress.      Appearance: Normal  appearance. He is ill-appearing and diaphoretic. He is not toxic-appearing.   HENT:      Head: Normocephalic and atraumatic.      Right Ear: External ear normal.      Left Ear: External ear normal.      Nose: Nose normal. No congestion.      Mouth/Throat:      Mouth: Mucous membranes are moist.      Pharynx: Oropharynx is clear. No oropharyngeal exudate.   Eyes:      Extraocular Movements: Extraocular movements intact.      Conjunctiva/sclera: Conjunctivae normal.      Pupils: Pupils are equal, round, and reactive to light.   Cardiovascular:      Rate and Rhythm: Tachycardia present. Rhythm irregular.      Pulses: Normal pulses.      Heart sounds: Normal heart sounds. No murmur heard.     No friction rub.   Pulmonary:      Effort: Tachypnea present. No respiratory distress.      Breath sounds: No stridor. No wheezing, rhonchi or rales.   Abdominal:      General: Abdomen is flat. There is no distension.      Tenderness: There is no abdominal tenderness. There is no guarding or rebound.   Musculoskeletal:         General: No deformity or signs of injury. Normal range of motion.      Cervical back: Normal range of motion.   Skin:     General: Skin is warm.      Capillary Refill: Capillary refill takes less than 2 seconds.      Coloration: Skin is not pale.      Findings: No bruising or erythema.   Neurological:      General: No focal deficit present.      Mental Status: He is alert.      Cranial Nerves: No cranial nerve deficit.      Motor: No weakness.   Psychiatric:         Mood and Affect: Mood normal.         Behavior: Behavior normal.         ED Course        Procedures         Results for orders placed or performed during the hospital encounter of 04/12/24 (from the past 24 hour(s))   EKG 12-lead, tracing only   Result Value Ref Range    Systolic Blood Pressure  mmHg    Diastolic Blood Pressure  mmHg    Ventricular Rate 161 BPM    Atrial Rate 147 BPM    AL Interval  ms    QRS Duration 90 ms     ms    QTc  484 ms    P Axis  degrees    R AXIS -25 degrees    T Axis 114 degrees    Interpretation ECG       Atrial fibrillation with rapid ventricular response with premature ventricular or aberrantly conducted complexes  Moderate voltage criteria for LVH, may be normal variant  ST & T wave abnormality, consider lateral ischemia  Abnormal ECG  Unconfirmed report - interpretation of this ECG is computer generated - see medical record for final interpretation  Confirmed by - EMERGENCY ROOM, PHYSICIAN ([id number]),  SLOAN LEMUS ([id number]) on 4/12/2024 10:34:20 PM     [place] Draw    Narrative    The following orders were created for panel order [place] Draw.  Procedure                               Abnormality         Status                     ---------                               -----------         ------                     Extra Blue Top Tube[[id number]]                              Final result               Extra Red Top Tube[[id number]]                               Final result               Extra Green Top (Lithium...[[id number]]                      Final result               Extra Purple Top Tube[[id number]]                            Final result                 Please view results for these tests on the individual orders.   Extra Blue Top Tube   Result Value Ref Range    Hold Specimen JIC    Extra Red Top Tube   Result Value Ref Range    Hold Specimen JIC    Extra Green Top (Lithium Heparin) Tube   Result Value Ref Range    Hold Specimen JIC    Extra Purple Top Tube   Result Value Ref Range    Hold Specimen JIC    CBC with platelets differential    Narrative    The following orders were created for panel order CBC with platelets differential.  Procedure                               Abnormality         Status                     ---------                               -----------         ------                     CBC with platelets and d...[[id number]]                      Final result                 Please view  results for these tests on the individual orders.   INR   Result Value Ref Range    INR 1.07 0.85 - 1.15   Partial thromboplastin time   Result Value Ref Range    aPTT 26 22 - 38 Seconds   ABO/Rh type and screen *Canceled*    Narrative    The following orders were created for panel order ABO/Rh type and screen.  Procedure                               Abnormality         Status                     ---------                               -----------         ------                     Adult Type and Screen[[id number]]                                                         Please view results for these tests on the individual orders.   Comprehensive metabolic panel   Result Value Ref Range    Sodium 136 135 - 145 mmol/L    Potassium 3.9 3.4 - 5.3 mmol/L    Carbon Dioxide (CO2) 17 (L) 22 - 29 mmol/L    Anion Gap 13 7 - 15 mmol/L    Urea Nitrogen 22.4 8.0 - 23.0 mg/dL    Creatinine 1.29 (H) 0.67 - 1.17 mg/dL    GFR Estimate 60 (L) >60 mL/min/1.73m2    Calcium 8.7 (L) 8.8 - 10.2 mg/dL    Chloride 106 98 - 107 mmol/L    Glucose 207 (H) 70 - 99 mg/dL    Alkaline Phosphatase 231 (H) 40 - 150 U/L    AST 25 0 - 45 U/L    ALT 25 0 - 70 U/L    Protein Total 7.1 6.4 - 8.3 g/dL    Albumin 3.9 3.5 - 5.2 g/dL    Bilirubin Total 0.7 <=1.2 mg/dL   Troponin T, High Sensitivity   Result Value Ref Range    Troponin T, High Sensitivity 25 (H) <=22 ng/L   Procalcitonin   Result Value Ref Range    Procalcitonin 0.55 (H) <0.50 ng/mL   Magnesium   Result Value Ref Range    Magnesium 1.7 1.7 - 2.3 mg/dL   CBC with platelets and differential   Result Value Ref Range    WBC Count 5.5 4.0 - 11.0 10e3/uL    RBC Count 5.04 4.40 - 5.90 10e6/uL    Hemoglobin 14.2 13.3 - 17.7 g/dL    Hematocrit 43.2 40.0 - 53.0 %    MCV 86 78 - 100 fL    MCH 28.2 26.5 - 33.0 pg    MCHC 32.9 31.5 - 36.5 g/dL    RDW 14.0 10.0 - 15.0 %    Platelet Count 180 150 - 450 10e3/uL    % Neutrophils 77 %    % Lymphocytes 20 %    % Monocytes 2 %    % Eosinophils 1 %    %  Basophils 0 %    % Immature Granulocytes 0 %    NRBCs per 100 WBC 0 <1 /100    Absolute Neutrophils 4.2 1.6 - 8.3 10e3/uL    Absolute Lymphocytes 1.1 0.8 - 5.3 10e3/uL    Absolute Monocytes 0.1 0.0 - 1.3 10e3/uL    Absolute Eosinophils 0.0 0.0 - 0.7 10e3/uL    Absolute Basophils 0.0 0.0 - 0.2 10e3/uL    Absolute Immature Granulocytes 0.0 <=0.4 10e3/uL    Absolute NRBCs 0.0 10e3/uL   ABO/Rh type and screen *Canceled*    Narrative    The following orders were created for panel order ABO/Rh type and screen.  Procedure                               Abnormality         Status                     ---------                               -----------         ------                       Please view results for these tests on the individual orders.   iStat Gases (lactate) venous, POCT   Result Value Ref Range    Lactic Acid POCT 1.6 <=2.0 mmol/L    Bicarbonate Venous POCT 23 21 - 28 mmol/L    O2 Sat, Venous POCT 58 (L) 70 - 75 %    pCO2 Venous POCT 38 (L) 40 - 50 mm Hg    pH Venous POCT 7.39 7.32 - 7.43    pO2 Venous POCT 31 25 - 47 mm Hg    Base Excess/Deficit (+/-) POCT -2.0 -3.0 - 3.0 mmol/L   Lactic acid whole blood   Result Value Ref Range    Lactic Acid 2.1 (H) 0.7 - 2.0 mmol/L   Blood gas venous   Result Value Ref Range    pH Venous 7.36 7.32 - 7.43    pCO2 Venous 37 (L) 40 - 50 mm Hg    pO2 Venous 38 25 - 47 mm Hg    Bicarbonate Venous 21 21 - 28 mmol/L    Base Excess/Deficit Venous -4.1 (L) -3.0 - 3.0 mmol/L    FIO2 21     Oxyhemoglobin Venous 70 70 - 75 %    O2 Sat, Venous 71.2 70.0 - 75.0 %    Narrative    In healthy individuals, oxyhemoglobin (O2Hb) and oxygen saturation (SO2) are approximately equal. In the presence of dyshemoglobins, oxyhemoglobin can be considerably lower than oxygen saturation.   UA with Microscopic reflex to Culture    Specimen: Urine, Clean Catch   Result Value Ref Range    Color Urine Light Yellow Colorless, Straw, Light Yellow, Yellow    Appearance Urine Clear Clear    Glucose Urine >=1000  (A) Negative mg/dL    Bilirubin Urine Negative Negative    Ketones Urine Negative Negative mg/dL    Specific Gravity Urine 1.014 1.003 - 1.035    Blood Urine Small (A) Negative    pH Urine 6.0 5.0 - 7.0    Protein Albumin Urine 300 (A) Negative mg/dL    Urobilinogen Urine Normal Normal, 2.0 mg/dL    Nitrite Urine Negative Negative    Leukocyte Esterase Urine Small (A) Negative    Bacteria Urine Few (A) None Seen /HPF    Mucus Urine Present (A) None Seen /LPF    RBC Urine 6 (H) <=2 /HPF    WBC Urine 79 (H) <=5 /HPF    Squamous Epithelials Urine <1 <=1 /HPF    Narrative    Urine Culture ordered based on laboratory criteria   XR Chest Port 1 View    Narrative    EXAM: XR CHEST PORT 1 VIEW  LOCATION: [facility]  DATE: 4/12/2024    INDICATION: sepsis  COMPARISON: None.      Impression    IMPRESSION: The heart is mildly enlarged. There is mild interstitial prominence seen in the right mid and lower lung zone and left lower lung zone to lesser degree, may reflect developing atypical infectious process versus interstitial edema.     Medications   pharmacy alert - intermittent dosing (has no administration in time range)   diltiazem (CARDIZEM) 125 mg in dextrose 5 % 125 mL infusion (5 mg/hr Intravenous $New Bag 4/12/24 2322)   vancomycin (VANCOCIN) 1,750 mg in sodium chloride 0.9 % 500 mL intermittent infusion (1,750 mg Intravenous $New Bag 4/12/24 2307)   vancomycin (VANCOCIN) 1,500 mg in 0.9% NaCl 250 mL intermittent infusion (has no administration in time range)   sodium chloride 0.9% BOLUS 1,000 mL (1,000 mLs Intravenous $New Bag 4/12/24 2140)   piperacillin-tazobactam (ZOSYN) 4.5 g vial to attach to  mL bag (4.5 g Intravenous $New Bag 4/12/24 2140)   diltiazem (CARDIZEM) injection 15 mg (15 mg Intravenous $Given 4/12/24 2139)             Critical care was performed.   Critical Care Addendum  My initial assessment, based on my review of prehospital provider report,  review of nursing observations, review of vital signs, focused history, physical exam, review of cardiac rhythm monitor, and 12 lead ECG analysis, established a high suspicion that Nava Limon has sepsis with indication for early goal-directed therapy and a critical arrhythmia, which requires immediate intervention, and therefore he is critically ill.     After the initial assessment, the care team initiated multiple lab tests, initiated IV fluid administration, and initiated medication therapy with zosyn, vancomycin, diltiazem infusion  to provide stabilization care. Due to the critical nature of this patient, I reassessed nursing observations, vital signs, physical exam, review of cardiac rhythm monitor, and 12 lead ECG analysis multiple times prior to his disposition.     Time also spent performing documentation, discussion with family to obtain medical information for decision making, reviewing test results, discussion with consultants, and coordination of care.     Critical care time (excluding teaching time and procedures): 50 minutes.     Assessments & Plan (with Medical Decision Making)     Nava Limon is a 68 year old male who has a self-reported history of liver transplantation in 2017 with otherwise no known past medical history per patient or chart review.  He arrives today to the emergency department as a level read patient secondary to altered mentation and rapid heart rate.  EMS was called per family with note of rapid onset of nausea, vomiting, profuse diaphoresis.  On scene medics did obtain a blood sugar noted to be in the 100s.  They placed patient on telemetry noting a rhythm consistent with atrial fibrillation with RVR.  Did report family noted some confusion with no obvious focal neurologic deficit.  Upon arrival patient noted to be ill in appearance and diaphoretic.  Placed on telemetry with note of rapid irregular rhythm.  Stat EKG obtained which on my interpretation demonstrates atrial  fibrillation with RVR.  Patient reports no history in the past.  I do not appreciate signs of ST elevation or evidence of obvious ischemia.  Some lateral T wave abnormality appreciated of age indeterminant status.  We did obtain temperature noting patient to be febrile.  Patient does meet SIRS/sepsis criteria in the setting of immune suppressed status would benefit from stat antibiotics after cultures, lactic acid, VBG, urinalysis and chest x-ray.  No localizing source of infection.  Specifically low suspicion for CNS infection such as meningitis encephalitis.  No obvious dental pain or intraoral lesions.  Patient noted to be hypoxic with SpO2 in the low to mid 80s placed on supplemental oxygen with improvement via nasal cannula.  No abdominal pain by palpation.  Low suspicion for acute intra-abdominal infection such as perforated viscus, bowel obstruction, diverticulitis or colitis.  I would plan for urinalysis as per above.  No appreciable rash.  This patient is critically ill at this time and require acute interventions including sepsis management, and interventions for atrial dysrhythmia.    Laboratory studies demonstrate minimal elevation lactic acid with no significant blood gas abnormality.  No obvious new anemia, leukocytosis or electrolyte abnormality.  As per above patient did receive diltiazem push with significant reduction in heart rate from 200s down to low 100s.  Patient did have creep back up of heart rate into the 120s and was initiated on diltiazem drip.  He reports significant reduction in symptoms.  He is no longer diaphoretic mentation appears to be clear.  Secondary to immune suppressed status and fever we will plan to have patient acutely hospitalized for continued monitoring and workup.  He would likely benefit from cardiology consultation in a.m.  At this time I believe he is appropriate for floor level of care.      I have reviewed the nursing notes.    I have reviewed the findings,  diagnosis, plan and need for follow up with the patient.    New Prescriptions    No medications on file       Final diagnoses:   Sepsis, due to unspecified organism, unspecified whether acute organ dysfunction present (H)   Atrial fibrillation with rapid ventricular response (H)   Confusion       TARIQ NICHOLS MD    4/12/2024   [facility] EMERGENCY DEPARTMENT     Tariq Nichols MD  04/12/24 7238

## 2024-04-13 NOTE — CONSULTS
Formerly [facility]   Cardiology Consult Note  Date of Service: 4/13/2024    Patient: Loy Limon  MRN: [id number]  Admission Date: 4/12/2024  Hospital Day # 1      ASSESSMENT:   1. Type II NSTEMI 2/2 sepsis and uncontrolled Afib  2. Paroxysmal atrial fibrillation, [id number] (age+DM2+CAD), not on long term anticoagulation  3. Coronary artery disease s/p PCI to mid LAD on 10/13/2021 with residual nonobstructive disease in D1, D2 (small) and RCA    RECOMMEND:  1. Low suspicion for acute coronary syndrome due to echocardiogram today (4/13/2024) without evidence of wall motion abnormality and the lack of chest pain sx from the patient  2. His troponin elevation trend remains c/w with Type II NSTEMI but he has residual nonobstructive CAD that may be causing mild ischemia, especially in the setting of sepsis and briefly uncontrolled Afib  3. Switch PO Lopressor to diltiazem ER, starting at 120 mg qday on 4/14/2024 for Afib rate control  4. Start long term anticoagulation for Afib stroke prophylaxis, the pt's chronic left atrial enlargement suggests he has already been experiencing subclinical Afib prior to admission. The preferred agent would be apixaban 5 mg BID  5. Place 14-day Zio Patch on discharge to quantify the pt's Afib burden  6. Follow up in general cardiology clinic within 1 month of discharge to discuss symptoms and further risk stratification of the patients chronic coronary heart disease and determine if additional rate or rhythm control is necessary for his Afib  7. Thank you for this consult    Patient was seen and discussed with attending physician, Dr. Flip Welch MD, PhD.     Richard Kate MD, PhD  Cardiology Fellow  Click to text page [phone]      -----------------------------------------------------------------------------------------------------------------------------------------  REASON FOR CONSULT:  NSTEMI    History of Present Illness   Loy Limon is a 68  year old male with alcoholic cirrhosis c/b HCC s/p liver txp , CAD s/p mLAD PCI 10/13/2021, paroxysmal Afib and DM2 who was admitted last night for sepsis with urinary source c/b atrial fibrillation with uncontrolled ventricular rates. Yesterday afternoon, the pt was at home when he began to shake violently and vomit. Because he was only recently admitted to hospital one month ago with an ESBL UTI, his wife called EMS to bring him back to hospital for evaluation. He was subsequently admitted to medicine due to severe sepsis and started on vanc/leydi. Separately, the pt was also found to have Afib with uncontrolled ventricular rates. This was controlled with a diltiazem gtt initially before switching to oral lopressor per the primary team. Finally, a high sensitivity troponin test was ordered by the ED physician as part of a protocolized workup of sepsis. The initial result was mildly elevated and thus the admitting medicine team has now continued to trend this laboratory test. Due to the continuing rise of this high sensitivity troponin test, there is now concern for acute coronary syndrome.     Past Medical History    Past Medical History:   Diagnosis Date     Anemia      Biliary stricture of transplanted liver (H) 2018     BPH (benign prostatic hyperplasia)      Cancer (H)     hepatocellualr carcinoma     Cirrhosis of liver (H) 2018     Diabetes (H)      Enlarged prostate      Hypothyroidism      Impotence of organic origin 2020     Inguinal hernia     Repaired with mesh on 18     Liver lesion 2018     Liver transplanted (H) 2018     donor liver transplant     Portal vein thrombosis     on path explant     Postoperative atrial fibrillation (H) 2018     Prostate cancer (H)      TB lung, latent     Treated        Past Surgical History   Past Surgical History:   Procedure Laterality Date     APPENDECTOMY       BRONCHOSCOPY (RIGID OR FLEXIBLE), DIAGNOSTIC N/A  03/05/2024    Procedure: BRONCHOSCOPY, WITH BRONCHOALVEOLAR LAVAGE;  Surgeon: Jimmy Farley MD;  Location:  GI     COLONOSCOPY      2018     CV CORONARY ANGIOGRAM N/A 10/13/2021    Procedure: CV CORONARY ANGIOGRAM;  Surgeon: Yaya Hampton MD;  Location:  HEART CARDIAC CATH LAB     CV CORONARY LITHOTRIPSY PCI N/A 10/13/2021    Procedure: CV Coronary Lithotripsy PCI;  Surgeon: Yaya Hampton MD;  Location:  HEART CARDIAC CATH LAB     CV INSTANTANEOUS WAVE-FREE RATIO N/A 10/13/2021    Procedure: Instantaneous Wave-Free Ratio;  Surgeon: Yaya Hampton MD;  Location:  HEART CARDIAC CATH LAB     CV INTRAVASULAR ULTRASOUND N/A 10/13/2021    Procedure: Intravascular Ultrasound;  Surgeon: Yaya Hampton MD;  Location:  HEART CARDIAC CATH LAB     CV PCI STENT DRUG ELUTING N/A 10/13/2021    Procedure: Percutaneous Coronary Intervention Stent Drug Eluting;  Surgeon: Yaya Hampton MD;  Location:  HEART CARDIAC CATH LAB     DAVINCI PROSTATECTOMY N/A 01/03/2020    Procedure: Robot-assisted laparoscopic radical prostatectomy, Bladder biopsy;  Surgeon: Kenrick Casiano MD;  Location: [facility]     ENDOSCOPIC RETROGRADE CHOLANGIOPANCREATOGRAM N/A 12/28/2018    Procedure: ENDOSCOPIC RETROGRADE CHOLANGIOPANCREATOGRAM, with Billary Sphincterotomy and Billary Stent Placement;  Surgeon: Omero Lawler MD;  Location: [facility] OR     ENDOSCOPIC RETROGRADE CHOLANGIOPANCREATOGRAM  02/18/2019    Procedure: COMBINED ENDOSCOPIC RETROGRADE CHOLANGIOPANCREATOGRAPHY, Bile Duct Stent Exchange;  Surgeon: Omero Lawler MD;  Location: [facility]     ENDOSCOPIC RETROGRADE CHOLANGIOPANCREATOGRAM N/A 04/29/2019    Procedure: ENDOSCOPIC RETROGRADE CHOLANGIOPANCREATOGRAPHY, WITH BILIARY STENT REMOVAL AND STONE EXTRACTION;  Surgeon: Omero Lawler MD;  Location: [facility]     HERNIORRHAPHY INGUINAL  12/22/2018    Procedure: Herniorrhaphy inguinal;   "Surgeon: Emil Echevarria MD;  Location: [facility] OR     IR LIVER BIOPSY PERCUTANEOUS  2018     LAPAROTOMY EXPLORATORY      per the patient \"for infection in the LLQ\"      PICC INSERTION Right 2024    47 cm basilic     TRANSPLANT LIVER RECIPIENT  DONOR N/A 2018    Procedure: TRANSPLANT LIVER RECIPIENT  DONOR;  Surgeon: Emil Echevarria MD;  Location: [facility] OR       Prior to Admission Medications   Prior to Admission Medications   Prescriptions Last Dose Informant Patient Reported? Taking?   amLODIPine (NORVASC) 10 MG tablet   No No   Sig: Take 1 tablet (10 mg) by mouth daily   blood glucose (NO BRAND SPECIFIED) lancets standard   No No   Sig: Use to test blood sugar 2 times daily or as directed.   blood glucose (NO BRAND SPECIFIED) test strip   No No   Sig: Use to test blood sugar 2 times daily or as directed. One touch ultra test strips   blood glucose (ONE TOUCH SURESOFT) lancing device   No No   Sig: Lancing device to be used with lancets.   blood glucose monitoring (ONE TOUCH ULTRA 2) meter device kit   No No   Sig: Use to test blood sugar 2 times daily or as directed.   blood glucose monitoring (ONE TOUCH ULTRASOFT) lancets   No No   Sig: Use to test blood sugar 2 times daily.   insulin pen needle (31G X 5 MM) 31G X 5 MM miscellaneous   No No   Sig: Use one  pen needles daily or as directed.   levothyroxine (SYNTHROID/LEVOTHROID) 150 MCG tablet   No No   Sig: Take 1 tablet (150 mcg) by mouth daily      Facility-Administered Medications: None       Allergies   No Known Allergies    Social History   Social History     Socioeconomic History     Marital status:      Spouse name: Not on file     Number of children: Not on file     Years of education: Not on file     Highest education level: Not on file   Occupational History     Not on file   Tobacco Use     Smoking status: Former     Current packs/day: 0.00     Average packs/day: (1.4 ttl pk-yrs)     Types: Cigarettes, " Cigars     Start date: 1970     Quit date: 3/14/2018     Years since quittin.0     Smokeless tobacco: Never     Tobacco comments:     Quit smoking 2018, one cigarette after dinner   Vaping Use     Vaping status: Never Used   Substance and Sexual Activity     Alcohol use: No     Comment: Quit drinking 2018     Drug use: No     Sexual activity: Not on file   Other Topics Concern     Parent/sibling w/ CABG, MI or angioplasty before 65F 55M? Not Asked   Social History Narrative    Born in [place], came to [place] years ago.     Has worked in [employer], [employer]       Family History   Family History   Problem Relation Age of Onset     Memory loss Mother      Liver Disease Brother      Skin Cancer No family hx of      Melanoma No family hx of        Review of Systems   The 10 point Review of Systems is negative other than noted in the HPI or here.     Physical Exam   Temp:  [99.1  F (37.3  C)-102.6  F (39.2  C)] 99.1  F (37.3  C)  Pulse:  [] 75  Resp:  [16] 16  BP: (106-180)/() 122/76  SpO2:  [81 %-100 %] 96 %  Vitals:    24 0440   Weight: 89.5 kg (197 lb 6.4 oz)      I/O last 3 completed shifts:  In: 21.5 [I.V.:21.5]  Out: -   Peripheral IV 24 Right Antecubital fossa (Active)   Number of days: 1       Peripheral IV 24 Left Antecubital fossa (Active)   Number of days: 1     Vent Settings:  Resp: 16 SpO2: 96 % O2 Device: None (Room air)    Resp: 16    GENERAL: No acute distress  HEENT: EOMI  NECK: Supple and without lymphadenopathy  CV: S1/S2 heard without murmur   RESPIRATORY: Normal breath sounds, no wheezes or crackles noted  GI: Soft, nontender and nondistended. No palpable organomegaly. Bowel sounds active  EXTREMITIES: No lower extremity edema  NEUROLOGIC: Alert and orientated x 3. CN II-XII grossly intact. No focal deficits noted.   MUSCULOSKELETAL: No joint swelling or tenderness.   SKIN: No jaundice noted    Data   Data reviewed today:     Recent  Labs   Lab 04/13/24  0727 04/13/24  0557 04/13/24  0029 04/12/24  2117   WBC  --  9.1  --  5.5   HGB  --  11.8*  --  14.2   MCV  --  87  --  86   PLT  --  183  --  180   INR  --  1.21*  --  1.07   NA  --  139  --  136   POTASSIUM  --  4.3  --  3.9   CHLORIDE  --  111*  --  106   CO2  --  20*  --  17*   BUN  --  22.3  --  22.4   CR  --  1.20*  --  1.29*   ANIONGAP  --  8  --  13   YELENA  --  7.9*  --  8.7*   * 160*  --  207*   ALBUMIN  --  3.1*  --  3.9   PROTTOTAL  --  5.6*  --  7.1   BILITOTAL  --  0.6  --  0.7   ALKPHOS  --  182*  --  231*   ALT  --  25  --  25   AST  --  30  --  25   LIPASE  --   --  19  --        Recent Results (from the past 24 hour(s))   XR Chest Port 1 View    Narrative    EXAM: XR CHEST PORT 1 VIEW  LOCATION: [facility]  DATE: 4/12/2024    INDICATION: sepsis  COMPARISON: None.      Impression    IMPRESSION: The heart is mildly enlarged. There is mild interstitial prominence seen in the right mid and lower lung zone and left lower lung zone to lesser degree, may reflect developing atypical infectious process versus interstitial edema.   US Abdomen Complete w Doppler Complete    Narrative    EXAMINATION: US ABDOMEN COMPLETE WITH DOPPLER COMPLETE, 4/13/2024 8:25  AM     COMPARISON: None.    HISTORY: Reported history of liver transplant admitted with sepsis,  for clot/ascites/potential infectious source    TECHNIQUE:  Gray-scale, color Doppler and spectral flow analysis.    FINDINGS:   There is no ascites.    Liver:   The liver demonstrates normal homogeneous echotexture. No  evidence of a focal hepatic mass.     Bile Ducts: Both the intra- and extrahepatic biliary system are of  normal caliber.  The common bile duct measures 7 mm in diameter,  within normal limits for age.    Gallbladder: The gallbladder is surgically absent.    Kidneys:   Right kidney:  The right kidney demonstrates normal echotexture with  no evidence of a shadowing stone,  focal mass or hydronephrosis.   9.7  cm in long axis dimension.  Left kidney: Not well seen    Pancreas: Visualized portions of the pancreas are normal in  appearance.    Spleen:  The spleen is unremarkable, measuring 11.0 cm.    Visualized portions of the aorta are unremarkable.    LIVER DOPPLER:  Splenic vein:  Patent continuous normal antegrade direction flow  towards the liver, 27 cm/sec.  Extrahepatic portal vein:  Patent continuous antegrade flow, 16  cm/sec.  Portal vein at anastomosis: Patent continuous antegrade flow, 29  cm/sec.  Intrahepatic portal vein:  Patent continuous antegrade flow, 24  cm/sec.  Right portal vein flow is antegrade, measuring 12 cm/sec.  Left portal vein flow is antegrade, measuring 13 cm/sec.    Inferior vena cava: patent with flow toward the heart throughout..  IVC at anastomosis:  182 cm/sec.  Intrahepatic IVC:  153 cm/sec.  IVC inferior to the anastomosis: 158 cm/s  Extrahepatic IVC:  79 cm/sec.    Right, mid, left hepatic veins: Patent with flow towards the inferior  vena cava.    Extrahepatic hepatic artery: Low resistance waveform with flow towards  the liver. 167 cm/sec with resistive index 0.76.  Right hepatic artery:   31  cm/sec with resistive index .  Left hepatic artery: 88 cm/sec with resistive index 0.73.      Impression    Impression:   Unremarkable appearance of the transplant liver. The evaluated vessels  are patent. No acute abnormality identified.    I have personally reviewed the examination and initial interpretation  and I agree with the findings.    CHAPARRITA MANRIQUEZ DO         SYSTEM ID:  [id number]   Echo Complete   Result Value    LVEF  55-60%    Narrative    [id number]  [id number]  [id number]  [id number]^[id number]^SHIVA     [facility],[place]  Echocardiography Laboratory  [address] [place] [id number]     Name: DONNA BIGGS  MRN: [id number]  : 1955  Study Date: 2024 09:08 AM  Age: 68 yrs  Gender: Male  Patient  [facility]: [facility]  Reason For Study: Arrhythmia, Afib with RVR  Ordering Physician: SHIVA CH  Performed By: Livier Salinas, TOM     BSA: 2.0 m2  Height: 68 in  Weight: 197 lb  HR: 73  BP: 122/76 mmHg  ______________________________________________________________________________  Procedure  Complete Portable Echo Adult.  ______________________________________________________________________________  Interpretation Summary  Global and regional left ventricular function is normal with an EF of 55-60%.  Right ventricular function, chamber size, wall motion, and thickness are  normal.  Severe left atrial enlargement is present.  The inferior vena cava is normal.  No pericardial effusion is present.  There is no prior study for direct comparison.  ______________________________________________________________________________  Left Ventricle  Global and regional left ventricular function is normal with an EF of 55-60%.  Left ventricular size is normal. Possible apical hypertrophy. Diastolic  function not assessed due to atrial fibrillation. No regional wall motion  abnormalities are seen.     Right Ventricle  Right ventricular function, chamber size, wall motion, and thickness are  normal.     Atria  The right atria appears normal. Severe left atrial enlargement is present.     Mitral Valve  Mild mitral annular calcification is present. Trace mitral insufficiency is  present.     Aortic Valve  The valve leaflets are not well visualized. Trace aortic insufficiency is  present.     Tricuspid Valve  The tricuspid valve is normal. Trace tricuspid insufficiency is present.  Pulmonary artery systolic pressure cannot be assessed.     Pulmonic Valve  The pulmonic valve is normal.     Vessels  The thoracic aorta is normal. The pulmonary artery and bifurcation cannot be  assessed. The inferior vena cava is normal.     Pericardium  No pericardial effusion is present.     Compared to Previous Study  There is no prior study for  direct comparison.  ______________________________________________________________________________  MMode/2D Measurements & Calculations     IVSd: 1.4 cm  LVIDd: 4.7 cm  LVIDs: 3.3 cm  LVPWd: 1.3 cm  FS: 30.5 %  LV mass(C)d: 248.8 grams  LV mass(C)dI: 122.5 grams/m2  asc Aorta Diam: 3.6 cm  LVOT diam: 1.9 cm  LVOT area: 2.9 cm2  Asc Ao diam index BSA (cm/m2): 1.8  Asc Ao diam index Ht(cm/m): 2.1  LA Volume (BP): 110.0 ml  LA Volume Index (BP): 54.2 ml/m2     RWT: 0.55  TAPSE: 1.6 cm     Doppler Measurements & Calculations  MV E max radha: 115.3 cm/sec  PA acc time: 0.11 sec     Measurements from QLAB  LV GLS Endo Peak A2C (AS): -13.4 %  LV GLS Endo Peak A3C (AS): -16.2 %  LV GLS Endo Peak A4C (AS): -16.6 %  LV GLS Endo Peak Avg (AS): -15.4 %     ______________________________________________________________________________  Report approved by: Lavinia Garcia 04/13/2024 10:21 AM              Attending Attestation: I saw, examined and evaluated the patient and reviewed all relevant data with the fellow. I agree with the findings, and our shared assessment and plan of care documented in the edited note and summarized the [place] findings and plan with the patient.

## 2024-04-13 NOTE — MEDICATION SCRIBE - ADMISSION MEDICATION HISTORY
Medication Scribe Admission Medication History    Admission medication history is complete. The information provided in this note is only as accurate as the sources available at the time of the update.    Information Source(s): [facility] and [facility]    Pertinent Information:   -Pt recently had a therapy visit and used the medication list from visit (this is in pt's other profile)  -Writer double checked with Pt's dispense history    Changes made to PTA medication list:  Added: All medications  Deleted: None  Changed: None    Allergies reviewed with patient and updates made in EHR: no    Medication History Completed By: Carmen Espino 4/13/2024 2:55 PM    PTA Med List   Medication Sig Last Dose    amLODIPine (NORVASC) 10 MG tablet Take 10 mg by mouth daily Unknown at unknown    aspirin (ASA) 81 MG chewable tablet Take 81 mg by mouth daily Unknown at unknown    atorvastatin (LIPITOR) 40 MG tablet Take 40 mg by mouth daily Unknown at unknown    hydrALAZINE (APRESOLINE) 25 MG tablet Take 25 mg by mouth daily Unknown at unknown    LANTUS SOLOSTAR 100 UNIT/ML soln INJECT 28 UNITS UNDER THE SKIN EVERY NIGHT AT BEDTIME Unknown at unknown    levothyroxine (SYNTHROID/LEVOTHROID) 150 MCG tablet Take 150 mcg by mouth daily Unknown at unknown    metFORMIN (GLUCOPHAGE XR) 500 MG 24 hr tablet Take 1,000 mg by mouth 2 times daily (with meals) Unknown at unknown    methocarbamol (ROBAXIN) 500 MG tablet Take 500 mg by mouth 4 times daily as needed for muscle spasms Unknown at unknown    metoprolol tartrate (LOPRESSOR) 25 MG tablet Take 25 mg by mouth 2 times daily Unknown at unknown    mycophenolate (GENERIC EQUIVALENT) 500 MG tablet Take 500 mg by mouth 2 times daily Unknown at unknown    predniSONE (DELTASONE) 5 MG tablet Take 5 mg by mouth daily Unknown at unknown    ursodiol (ACTIGALL) 250 MG tablet Take 250 mg by mouth 2 times daily Unknown at unknown    VITAMIN D3 25 MCG (1000 UT) tablet Take 2  tablets by mouth daily at 2 pm Unknown at unknown

## 2024-04-13 NOTE — ED TRIAGE NOTES
BIBA FROM HOME family believes he is more confused. Co of \nausea and vomitting. EKG showed afib rvr with rates of 150s.

## 2024-04-13 NOTE — PROGRESS NOTES
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 He was admitted on 4/12/2024 with sepsis and afib with RVR.      Today's plan   -Noted gradual rise in troponin, Noted some symmetric EKG changes in lateral leads v4,v5,v6 although patient denies any chest pain   -Troponin rise could be 2/2 demand ischemia from infection/sepsis or Rapid A fib on noted on admission   -Plan to deescalate antibiotics within the next few days        Severe sepsis- improved  History of ESBL urinary tract infections  Admitted 4/12/2024 with acute onset of rigors and vomiting and found to be septic, concerning for Gram negative bacteremia, especially in the setting of recent admission for ESBL E. Coli pyelonephritis. Urine and blood cultures obtained in ED and he was started on broad antibiotics (vanc IV and Zosyn). Upon admission broadened to meropenem given recent hospitalization and ESBL history- can de-escalate pending culture data. At this time, differential includes UTI given recent UTIs- will await urine culture result and also await abdominal ultrasound to evaluate kidneys for possible pyelonephritis. Will also want to look for biliary pathology and ascites as potential sources of infection, though less likely given benign abdominal exam and normal AST, ALT, and bili (alk phos is elevated to 231 but it has been elevated before too). Lipase wnl. Lung pathology less likely as he does not have new respiratory symptoms and recent Chest CT 3/22/2024 showed improvement in bilateral opacities seen on prior admission. Additionally, bronch during prior admission revealed negative infectious workup. CXR this admission did  show mild interstitial prominence in right mid and lower lung zone and left lower lung zone, but could represent the changes already known on prior CT scans.               - meropenem (4/13 - P)              - vancomycin IV (4/12 - P)              - Zosyn (4/12)  - Micro:               - sputum culture ordered              - MRSA nares ordered              - 4/12 COVID/flu/RSV- negative              - 4/12 respiratory viral panel- negative              - 4/12 urine culture- in process              - 4/12 strep pneumo urine antigen- negative              - 4/12 legionella urine antigen- negative              - 4/12 blood cultures x2- in process  - abdominal ultrasound w/ Doppler to evaluate for infectious sources      Afib with RVR- improved  Admitted with afib RVR with rates to 160s, improved rates with diltiazem gtt in the ED. Likely secondary to sepsis. Of note, during previous hospital stay he was discharged with metoprolol tartrate so will resume that and stop diltiazem gtt.   - resume pta metoprolol tartrate 12.5mg BID   - stop diltiazem gtt   - not on anticoagulation pta because of hemoptysis during previous admission. Anticoagulation can be considered this admission.   - TTE ordered     Most recent TTE from 3/3/2024:   Left ventricular function is normal.The ejection fraction is 60-65%.  Global right ventricular function is normal.  The inferior vena cava is normal.     DEREK now resolved   Creatinine 1.29 on admission from baseline of ~0.8. Likely in setting of sepsis and afib RVR.   - abdominal US above will comment on kidneys   - CTM     Elevated troponin  Likely demand in the setting of afib RVR. No chest pain.    - trend troponin to peak.   -Noted some EKG changes in lateral leads, Would consult cardiology to comment on it      Dispo planning  - PT/OT consults        ____________________________  Chronic/stable/improved:     Hypothyroidism  3/2/2024 TSH mildly elevated at 5.47, free T4 wnl at 1.24.  "4/13/2024 TSH 0.32.   - continue levothyroxine 150mcg daily     Cirrhosis related to alcohol c/b HCC s/p s/p DDLT (2018)  Hx of biliary stricture requiring stent, last ERCP 2019  - Continue pta mycophenolate 500mg BID  - Continue pta prednisone 5mg daily   - Continue pta ursodiol 250mg BID     HTN  - holding pta amlodipine 10mg daily in the setting of sepsis  - holding pta hydralazine 25mg BID in the setting of sepsis     CAD s/p PCI to mLAD   - continue pta atorvastatin 40mg daily  - resume pta aspirin 81mg daily (patient was not taking at home, but should be)     Type 2 diabetes  2/6/2024 A1c 9.2%.   - holding pta glargine insulin (written as 28 units, but patient reports taking 20 units only occasionally depending on his blood sugars)  - holding pta metformin 1000mg BID  - MDSSI  - hypoglycemia protocol                 Diet: Low Saturated Fat Na <2400 mg    DVT Prophylaxis: Pneumatic Compression Devices  Cronin Catheter: Not present  Lines: None     Cardiac Monitoring: None  Code Status: Full Code      Clinically Significant Risk Factors Present on Admission              # Hypoalbuminemia: Lowest albumin = 3.1 g/dL at 4/13/2024  5:57 AM, will monitor as appropriate  # Coagulation Defect: INR = 1.21 (Ref range: 0.85 - 1.15) and/or PTT = 26 Seconds (Ref range: 22 - 38 Seconds), will monitor for bleeding         # Obesity: Estimated body mass index is 30.27 kg/m  as calculated from the following:    Height as of this encounter: 1.72 m (5' 7.72\").    Weight as of this encounter: 89.5 kg (197 lb 6.4 oz).              Disposition Plan     Medically Ready for Discharge:              Festus Carter MD  Hospitalist Service, GOLD TEAM [address] [place] [address]  Securely message with [employer] (more info)  Text page via [employer] Paging/Directory   See signed in provider for up to date coverage information  ______________________________________________________________________    Interval History "   Patient was examined this morning with , no new abdominal pain, difficulty breathing,fevers or malaise     Physical Exam   Vital Signs: Temp: 99.1  F (37.3  C) Temp src: Axillary BP: 122/76 Pulse: 75   Resp: 16 SpO2: 96 % O2 Device: None (Room air)    Weight: 197 lbs 6.4 oz    Constitutional: awake, alert, cooperative, no apparent distress, and appears stated age  Eyes: Lids and lashes normal, pupils equal, round and reactive to light, extra ocular muscles intact, sclera clear, conjunctiva normal  ENT: Normocephalic, without obvious abnormality, atraumatic, sinuses nontender on palpation, external ears without lesions, oral pharynx with moist mucous membranes, tonsils without erythema or exudates, gums normal and good dentition.  Hematologic / Lymphatic: no cervical lymphadenopathy  Respiratory: No increased work of breathing, good air exchange, clear to auscultation bilaterally, no crackles or wheezing  Cardiovascular: Normal apical impulse, regular rate and rhythm, normal S1 and S2, no S3 or S4, and no murmur noted  GI: No scars, normal bowel sounds, soft, non-distended, non-tender, no masses palpated, no hepatosplenomegally  Genitounirinary:   Skin: no bruising or bleeding  Musculoskeletal: There is no redness, warmth, or swelling of the joints.  Full range of motion noted.  Motor strength is 5 out of 5 all extremities bilaterally.  Tone is normal.  Neurologic: Awake, alert, oriented to name, place and time.  Cranial nerves II-XII are grossly intact.  Motor is 5 out of 5 bilaterally.  Cerebellar finger to nose, heel to shin intact.  Sensory is intact.  Babinski down going, Romberg negative, and gait is normal.  Neuropsychiatric: General: normal, calm, and normal eye contact    Medical Decision Making       32 MINUTES SPENT BY ME on the date of service doing chart review, history, exam, documentation & further activities per the note.      Data     I have personally reviewed the following data over  the past 24 hrs:    9.1  \   11.8 (L)   / 183     139 111 (H) 22.3 /  142 (H)   4.3 20 (L) 1.20 (H) \     ALT: 25 AST: 30 AP: 182 (H) TBILI: 0.6   ALB: 3.1 (L) TOT PROTEIN: 5.6 (L) LIPASE: 19     Trop: 158 (HH) BNP: N/A     TSH: 0.32 T4: N/A A1C: N/A     Procal: 0.55 (H) CRP: 20.40 (H) Lactic Acid: 1.3       INR:  1.21 (H) PTT:  26   D-dimer:  N/A Fibrinogen:  N/A       Imaging results reviewed over the past 24 hrs:   Recent Results (from the past 24 hour(s))   XR Chest Port 1 View    Narrative    EXAM: XR CHEST PORT 1 VIEW  LOCATION: [facility]  DATE: 4/12/2024    INDICATION: sepsis  COMPARISON: None.      Impression    IMPRESSION: The heart is mildly enlarged. There is mild interstitial prominence seen in the right mid and lower lung zone and left lower lung zone to lesser degree, may reflect developing atypical infectious process versus interstitial edema.   US Abdomen Complete w Doppler Complete    Narrative    EXAMINATION: US ABDOMEN COMPLETE WITH DOPPLER COMPLETE, 4/13/2024 8:25  AM     COMPARISON: None.    HISTORY: Reported history of liver transplant admitted with sepsis,  for clot/ascites/potential infectious source    TECHNIQUE:  Gray-scale, color Doppler and spectral flow analysis.    FINDINGS:   There is no ascites.    Liver:   The liver demonstrates normal homogeneous echotexture. No  evidence of a focal hepatic mass.     Bile Ducts: Both the intra- and extrahepatic biliary system are of  normal caliber.  The common bile duct measures 7 mm in diameter,  within normal limits for age.    Gallbladder: The gallbladder is surgically absent.    Kidneys:   Right kidney:  The right kidney demonstrates normal echotexture with  no evidence of a shadowing stone, focal mass or hydronephrosis.   9.7  cm in long axis dimension.  Left kidney: Not well seen    Pancreas: Visualized portions of the pancreas are normal in  appearance.    Spleen:  The spleen is unremarkable,  measuring 11.0 cm.    Visualized portions of the aorta are unremarkable.    LIVER DOPPLER:  Splenic vein:  Patent continuous normal antegrade direction flow  towards the liver, 27 cm/sec.  Extrahepatic portal vein:  Patent continuous antegrade flow, 16  cm/sec.  Portal vein at anastomosis: Patent continuous antegrade flow, 29  cm/sec.  Intrahepatic portal vein:  Patent continuous antegrade flow, 24  cm/sec.  Right portal vein flow is antegrade, measuring 12 cm/sec.  Left portal vein flow is antegrade, measuring 13 cm/sec.    Inferior vena cava: patent with flow toward the heart throughout..  IVC at anastomosis:  182 cm/sec.  Intrahepatic IVC:  153 cm/sec.  IVC inferior to the anastomosis: 158 cm/s  Extrahepatic IVC:  79 cm/sec.    Right, mid, left hepatic veins: Patent with flow towards the inferior  vena cava.    Extrahepatic hepatic artery: Low resistance waveform with flow towards  the liver. 167 cm/sec with resistive index 0.76.  Right hepatic artery:   31  cm/sec with resistive index .  Left hepatic artery: 88 cm/sec with resistive index 0.73.      Impression    Impression:   Unremarkable appearance of the transplant liver. The evaluated vessels  are patent. No acute abnormality identified.    I have personally reviewed the examination and initial interpretation  and I agree with the findings.    CHAPARRITA MANRIQUEZ DO         SYSTEM ID:  [id number]   Echo Complete   Result Value    LVEF  55-60%    Narrative    [id number]  [id number]  [id number]  [id number]^SHIVA     [facility],[place]  Echocardiography Laboratory  [address] [place] [id number]     Name: DONNA BIGGS  MRN: [id number]  : 1955  Study Date: 2024 09:08 AM  Age: 68 yrs  Gender: Male  Patient Location: [facility]  Reason For Study: Arrhythmia, Afib with RVR  Ordering Physician: SHIVA CH  Performed By: iLvier Salinas RDCS     BSA: 2.0 m2  Height: 68 in  Weight: 197 lb  HR: 73  BP: 122/76  mmHg  ______________________________________________________________________________  Procedure  Complete Portable Echo Adult.  ______________________________________________________________________________  Interpretation Summary  Global and regional left ventricular function is normal with an EF of 55-60%.  Right ventricular function, chamber size, wall motion, and thickness are  normal.  Severe left atrial enlargement is present.  The inferior vena cava is normal.  No pericardial effusion is present.  There is no prior study for direct comparison.  ______________________________________________________________________________  Left Ventricle  Global and regional left ventricular function is normal with an EF of 55-60%.  Left ventricular size is normal. Possible apical hypertrophy. Diastolic  function not assessed due to atrial fibrillation. No regional wall motion  abnormalities are seen.     Right Ventricle  Right ventricular function, chamber size, wall motion, and thickness are  normal.     Atria  The right atria appears normal. Severe left atrial enlargement is present.     Mitral Valve  Mild mitral annular calcification is present. Trace mitral insufficiency is  present.     Aortic Valve  The valve leaflets are not well visualized. Trace aortic insufficiency is  present.     Tricuspid Valve  The tricuspid valve is normal. Trace tricuspid insufficiency is present.  Pulmonary artery systolic pressure cannot be assessed.     Pulmonic Valve  The pulmonic valve is normal.     Vessels  The thoracic aorta is normal. The pulmonary artery and bifurcation cannot be  assessed. The inferior vena cava is normal.     Pericardium  No pericardial effusion is present.     Compared to Previous Study  There is no prior study for direct comparison.  ______________________________________________________________________________  MMode/2D Measurements & Calculations     IVSd: 1.4 cm  LVIDd: 4.7 cm  LVIDs: 3.3 cm  LVPWd: 1.3  cm  FS: 30.5 %  LV mass(C)d: 248.8 grams  LV mass(C)dI: 122.5 grams/m2  asc Aorta Diam: 3.6 cm  LVOT diam: 1.9 cm  LVOT area: 2.9 cm2  Asc Ao diam index BSA (cm/m2): 1.8  Asc Ao diam index Ht(cm/m): 2.1  LA Volume (BP): 110.0 ml  LA Volume Index (BP): 54.2 ml/m2     RWT: 0.55  TAPSE: 1.6 cm     Doppler Measurements & Calculations  MV E max radha: 115.3 cm/sec  PA acc time: 0.11 sec     Measurements from QLAB  LV GLS Endo Peak A2C (AS): -13.4 %  LV GLS Endo Peak A3C (AS): -16.2 %  LV GLS Endo Peak A4C (AS): -16.6 %  LV GLS Endo Peak Avg (AS): -15.4 %     ______________________________________________________________________________  Report approved by: Lavinia Garcia 04/13/2024 10:21 AM

## 2024-04-14 ENCOUNTER — APPOINTMENT (OUTPATIENT)
Dept: CT IMAGING | Facility: CLINIC | Age: 69
DRG: 871 | End: 2024-04-14
Attending: STUDENT IN AN ORGANIZED HEALTH CARE EDUCATION/TRAINING PROGRAM
Payer: COMMERCIAL

## 2024-04-14 ENCOUNTER — APPOINTMENT (OUTPATIENT)
Dept: OCCUPATIONAL THERAPY | Facility: CLINIC | Age: 69
DRG: 871 | End: 2024-04-14
Payer: COMMERCIAL

## 2024-04-14 ENCOUNTER — APPOINTMENT (OUTPATIENT)
Dept: ULTRASOUND IMAGING | Facility: CLINIC | Age: 69
DRG: 871 | End: 2024-04-14
Attending: STUDENT IN AN ORGANIZED HEALTH CARE EDUCATION/TRAINING PROGRAM
Payer: COMMERCIAL

## 2024-04-14 ENCOUNTER — APPOINTMENT (OUTPATIENT)
Dept: ULTRASOUND IMAGING | Facility: CLINIC | Age: 69
DRG: 871 | End: 2024-04-14
Attending: PHYSICIAN ASSISTANT
Payer: COMMERCIAL

## 2024-04-14 LAB
ALBUMIN SERPL BCG-MCNC: 3.2 G/DL (ref 3.5–5.2)
ALP SERPL-CCNC: 180 U/L (ref 40–150)
ALT SERPL W P-5'-P-CCNC: 19 U/L (ref 0–70)
ANION GAP SERPL CALCULATED.3IONS-SCNC: 11 MMOL/L (ref 7–15)
AST SERPL W P-5'-P-CCNC: 19 U/L (ref 0–45)
BACTERIA UR CULT: NORMAL
BASOPHILS # BLD AUTO: 0 10E3/UL (ref 0–0.2)
BASOPHILS NFR BLD AUTO: 0 %
BILIRUB SERPL-MCNC: 1 MG/DL
BUN SERPL-MCNC: 15.9 MG/DL (ref 8–23)
CALCIUM SERPL-MCNC: 8.6 MG/DL (ref 8.8–10.2)
CHLORIDE SERPL-SCNC: 103 MMOL/L (ref 98–107)
CREAT SERPL-MCNC: 0.89 MG/DL (ref 0.67–1.17)
CRP SERPL-MCNC: 101 MG/L
DEPRECATED HCO3 PLAS-SCNC: 19 MMOL/L (ref 22–29)
EGFRCR SERPLBLD CKD-EPI 2021: >90 ML/MIN/1.73M2
EOSINOPHIL # BLD AUTO: 0 10E3/UL (ref 0–0.7)
EOSINOPHIL NFR BLD AUTO: 0 %
ERYTHROCYTE [DISTWIDTH] IN BLOOD BY AUTOMATED COUNT: 14.1 % (ref 10–15)
GLUCOSE BLDC GLUCOMTR-MCNC: 144 MG/DL (ref 70–99)
GLUCOSE BLDC GLUCOMTR-MCNC: 151 MG/DL (ref 70–99)
GLUCOSE BLDC GLUCOMTR-MCNC: 157 MG/DL (ref 70–99)
GLUCOSE BLDC GLUCOMTR-MCNC: 238 MG/DL (ref 70–99)
GLUCOSE BLDC GLUCOMTR-MCNC: 261 MG/DL (ref 70–99)
GLUCOSE BLDC GLUCOMTR-MCNC: 315 MG/DL (ref 70–99)
GLUCOSE BLDC GLUCOMTR-MCNC: 329 MG/DL (ref 70–99)
GLUCOSE SERPL-MCNC: 304 MG/DL (ref 70–99)
HCT VFR BLD AUTO: 40.9 % (ref 40–53)
HGB BLD-MCNC: 13.3 G/DL (ref 13.3–17.7)
HOLD SPECIMEN: NORMAL
IMM GRANULOCYTES # BLD: 0 10E3/UL
IMM GRANULOCYTES NFR BLD: 0 %
L PNEUMO1 AG UR QL IA: NEGATIVE
LACTATE SERPL-SCNC: 0.9 MMOL/L (ref 0.7–2)
LYMPHOCYTES # BLD AUTO: 1.2 10E3/UL (ref 0.8–5.3)
LYMPHOCYTES NFR BLD AUTO: 14 %
MCH RBC QN AUTO: 27.8 PG (ref 26.5–33)
MCHC RBC AUTO-ENTMCNC: 32.5 G/DL (ref 31.5–36.5)
MCV RBC AUTO: 85 FL (ref 78–100)
MONOCYTES # BLD AUTO: 0.6 10E3/UL (ref 0–1.3)
MONOCYTES NFR BLD AUTO: 6 %
NEUTROPHILS # BLD AUTO: 6.8 10E3/UL (ref 1.6–8.3)
NEUTROPHILS NFR BLD AUTO: 80 %
NRBC # BLD AUTO: 0 10E3/UL
NRBC BLD AUTO-RTO: 0 /100
PLATELET # BLD AUTO: 164 10E3/UL (ref 150–450)
POTASSIUM SERPL-SCNC: 4.2 MMOL/L (ref 3.4–5.3)
PROCALCITONIN SERPL IA-MCNC: 30.8 NG/ML
PROT SERPL-MCNC: 6 G/DL (ref 6.4–8.3)
RBC # BLD AUTO: 4.79 10E6/UL (ref 4.4–5.9)
S PNEUM AG SPEC QL: NEGATIVE
SODIUM SERPL-SCNC: 133 MMOL/L (ref 135–145)
WBC # BLD AUTO: 8.6 10E3/UL (ref 4–11)

## 2024-04-14 PROCEDURE — 87040 BLOOD CULTURE FOR BACTERIA: CPT | Performed by: PHYSICIAN ASSISTANT

## 2024-04-14 PROCEDURE — 71260 CT THORAX DX C+: CPT | Mod: 26 | Performed by: RADIOLOGY

## 2024-04-14 PROCEDURE — 250N000011 HC RX IP 250 OP 636: Mod: JZ

## 2024-04-14 PROCEDURE — 76770 US EXAM ABDO BACK WALL COMP: CPT | Mod: 26 | Performed by: RADIOLOGY

## 2024-04-14 PROCEDURE — 76770 US EXAM ABDO BACK WALL COMP: CPT

## 2024-04-14 PROCEDURE — 258N000003 HC RX IP 258 OP 636: Performed by: EMERGENCY MEDICINE

## 2024-04-14 PROCEDURE — 86140 C-REACTIVE PROTEIN: CPT | Performed by: STUDENT IN AN ORGANIZED HEALTH CARE EDUCATION/TRAINING PROGRAM

## 2024-04-14 PROCEDURE — 250N000012 HC RX MED GY IP 250 OP 636 PS 637

## 2024-04-14 PROCEDURE — 87529 HSV DNA AMP PROBE: CPT | Performed by: STUDENT IN AN ORGANIZED HEALTH CARE EDUCATION/TRAINING PROGRAM

## 2024-04-14 PROCEDURE — 85025 COMPLETE CBC W/AUTO DIFF WBC: CPT | Performed by: STUDENT IN AN ORGANIZED HEALTH CARE EDUCATION/TRAINING PROGRAM

## 2024-04-14 PROCEDURE — 999N000147 HC STATISTIC PT IP EVAL DEFER: Performed by: PHYSICAL THERAPIST

## 2024-04-14 PROCEDURE — 87899 AGENT NOS ASSAY W/OPTIC: CPT | Performed by: STUDENT IN AN ORGANIZED HEALTH CARE EDUCATION/TRAINING PROGRAM

## 2024-04-14 PROCEDURE — 70450 CT HEAD/BRAIN W/O DYE: CPT

## 2024-04-14 PROCEDURE — 74176 CT ABD & PELVIS W/O CONTRAST: CPT

## 2024-04-14 PROCEDURE — 120N000005 HC R&B MS OVERFLOW UMMC

## 2024-04-14 PROCEDURE — 97530 THERAPEUTIC ACTIVITIES: CPT | Mod: GO | Performed by: OCCUPATIONAL THERAPIST

## 2024-04-14 PROCEDURE — 36415 COLL VENOUS BLD VENIPUNCTURE: CPT | Performed by: PHYSICIAN ASSISTANT

## 2024-04-14 PROCEDURE — 83605 ASSAY OF LACTIC ACID: CPT | Performed by: INTERNAL MEDICINE

## 2024-04-14 PROCEDURE — 250N000013 HC RX MED GY IP 250 OP 250 PS 637: Performed by: INTERNAL MEDICINE

## 2024-04-14 PROCEDURE — 99207 PR NO BILLABLE SERVICE THIS VISIT: CPT | Performed by: STUDENT IN AN ORGANIZED HEALTH CARE EDUCATION/TRAINING PROGRAM

## 2024-04-14 PROCEDURE — 70450 CT HEAD/BRAIN W/O DYE: CPT | Mod: 26 | Performed by: RADIOLOGY

## 2024-04-14 PROCEDURE — 36415 COLL VENOUS BLD VENIPUNCTURE: CPT | Performed by: STUDENT IN AN ORGANIZED HEALTH CARE EDUCATION/TRAINING PROGRAM

## 2024-04-14 PROCEDURE — 97535 SELF CARE MNGMENT TRAINING: CPT | Mod: GO | Performed by: OCCUPATIONAL THERAPIST

## 2024-04-14 PROCEDURE — 258N000003 HC RX IP 258 OP 636

## 2024-04-14 PROCEDURE — 250N000013 HC RX MED GY IP 250 OP 250 PS 637: Performed by: STUDENT IN AN ORGANIZED HEALTH CARE EDUCATION/TRAINING PROGRAM

## 2024-04-14 PROCEDURE — 97165 OT EVAL LOW COMPLEX 30 MIN: CPT | Mod: GO | Performed by: OCCUPATIONAL THERAPIST

## 2024-04-14 PROCEDURE — 87799 DETECT AGENT NOS DNA QUANT: CPT | Performed by: STUDENT IN AN ORGANIZED HEALTH CARE EDUCATION/TRAINING PROGRAM

## 2024-04-14 PROCEDURE — 80053 COMPREHEN METABOLIC PANEL: CPT | Performed by: STUDENT IN AN ORGANIZED HEALTH CARE EDUCATION/TRAINING PROGRAM

## 2024-04-14 PROCEDURE — 93970 EXTREMITY STUDY: CPT | Mod: 26 | Performed by: RADIOLOGY

## 2024-04-14 PROCEDURE — 250N000011 HC RX IP 250 OP 636: Performed by: EMERGENCY MEDICINE

## 2024-04-14 PROCEDURE — 84145 PROCALCITONIN (PCT): CPT | Performed by: STUDENT IN AN ORGANIZED HEALTH CARE EDUCATION/TRAINING PROGRAM

## 2024-04-14 PROCEDURE — 99233 SBSQ HOSP IP/OBS HIGH 50: CPT | Performed by: STUDENT IN AN ORGANIZED HEALTH CARE EDUCATION/TRAINING PROGRAM

## 2024-04-14 PROCEDURE — 36415 COLL VENOUS BLD VENIPUNCTURE: CPT | Performed by: INTERNAL MEDICINE

## 2024-04-14 PROCEDURE — 250N000013 HC RX MED GY IP 250 OP 250 PS 637: Performed by: PHYSICIAN ASSISTANT

## 2024-04-14 PROCEDURE — 250N000013 HC RX MED GY IP 250 OP 250 PS 637

## 2024-04-14 PROCEDURE — 93970 EXTREMITY STUDY: CPT

## 2024-04-14 PROCEDURE — 74176 CT ABD & PELVIS W/O CONTRAST: CPT | Mod: 26 | Performed by: RADIOLOGY

## 2024-04-14 PROCEDURE — 250N000011 HC RX IP 250 OP 636: Performed by: INTERNAL MEDICINE

## 2024-04-14 PROCEDURE — 87449 NOS EACH ORGANISM AG IA: CPT | Performed by: STUDENT IN AN ORGANIZED HEALTH CARE EDUCATION/TRAINING PROGRAM

## 2024-04-14 PROCEDURE — 71260 CT THORAX DX C+: CPT

## 2024-04-14 RX ORDER — AZITHROMYCIN 250 MG/1
500 TABLET, FILM COATED ORAL ONCE
Status: COMPLETED | OUTPATIENT
Start: 2024-04-14 | End: 2024-04-14

## 2024-04-14 RX ORDER — METOPROLOL TARTRATE 1 MG/ML
5 INJECTION, SOLUTION INTRAVENOUS
Status: DISCONTINUED | OUTPATIENT
Start: 2024-04-14 | End: 2024-04-17 | Stop reason: HOSPADM

## 2024-04-14 RX ORDER — AZITHROMYCIN 250 MG/1
250 TABLET, FILM COATED ORAL DAILY
Status: DISCONTINUED | OUTPATIENT
Start: 2024-04-15 | End: 2024-04-15

## 2024-04-14 RX ORDER — METOPROLOL TARTRATE 25 MG/1
25 TABLET, FILM COATED ORAL 2 TIMES DAILY
Status: DISCONTINUED | OUTPATIENT
Start: 2024-04-14 | End: 2024-04-14

## 2024-04-14 RX ORDER — IOPAMIDOL 755 MG/ML
91 INJECTION, SOLUTION INTRAVASCULAR ONCE
Status: COMPLETED | OUTPATIENT
Start: 2024-04-14 | End: 2024-04-14

## 2024-04-14 RX ORDER — ACETAMINOPHEN 325 MG/1
650 TABLET ORAL EVERY 4 HOURS PRN
Status: DISCONTINUED | OUTPATIENT
Start: 2024-04-14 | End: 2024-04-17 | Stop reason: HOSPADM

## 2024-04-14 RX ORDER — METOPROLOL TARTRATE 25 MG/1
25 TABLET, FILM COATED ORAL ONCE
Status: COMPLETED | OUTPATIENT
Start: 2024-04-14 | End: 2024-04-14

## 2024-04-14 RX ADMIN — MEROPENEM 2 G: 1 INJECTION, POWDER, FOR SOLUTION INTRAVENOUS at 21:18

## 2024-04-14 RX ADMIN — VANCOMYCIN HYDROCHLORIDE 1500 MG: 10 INJECTION, POWDER, LYOPHILIZED, FOR SOLUTION INTRAVENOUS at 02:20

## 2024-04-14 RX ADMIN — ATORVASTATIN CALCIUM 40 MG: 40 TABLET, FILM COATED ORAL at 21:00

## 2024-04-14 RX ADMIN — INSULIN ASPART 4 UNITS: 100 INJECTION, SOLUTION INTRAVENOUS; SUBCUTANEOUS at 21:01

## 2024-04-14 RX ADMIN — Medication 12.5 MG: at 20:45

## 2024-04-14 RX ADMIN — INSULIN ASPART 3 UNITS: 100 INJECTION, SOLUTION INTRAVENOUS; SUBCUTANEOUS at 13:03

## 2024-04-14 RX ADMIN — DILTIAZEM HYDROCHLORIDE 120 MG: 120 CAPSULE, COATED, EXTENDED RELEASE ORAL at 08:12

## 2024-04-14 RX ADMIN — INSULIN ASPART 1 UNITS: 100 INJECTION, SOLUTION INTRAVENOUS; SUBCUTANEOUS at 08:13

## 2024-04-14 RX ADMIN — PREDNISONE 5 MG: 5 TABLET ORAL at 08:12

## 2024-04-14 RX ADMIN — VANCOMYCIN HYDROCHLORIDE 1500 MG: 10 INJECTION, POWDER, LYOPHILIZED, FOR SOLUTION INTRAVENOUS at 22:50

## 2024-04-14 RX ADMIN — ACETAMINOPHEN 650 MG: 325 TABLET, FILM COATED ORAL at 21:22

## 2024-04-14 RX ADMIN — MEROPENEM 2 G: 1 INJECTION, POWDER, FOR SOLUTION INTRAVENOUS at 13:18

## 2024-04-14 RX ADMIN — APIXABAN 5 MG: 5 TABLET, FILM COATED ORAL at 21:00

## 2024-04-14 RX ADMIN — MYCOPHENOLATE MOFETIL 500 MG: 500 TABLET, FILM COATED ORAL at 18:51

## 2024-04-14 RX ADMIN — ASPIRIN 81 MG CHEWABLE TABLET 81 MG: 81 TABLET CHEWABLE at 08:12

## 2024-04-14 RX ADMIN — MYCOPHENOLATE MOFETIL 500 MG: 500 TABLET, FILM COATED ORAL at 08:12

## 2024-04-14 RX ADMIN — AZITHROMYCIN DIHYDRATE 500 MG: 250 TABLET ORAL at 14:34

## 2024-04-14 RX ADMIN — MEROPENEM 2 G: 1 INJECTION, POWDER, FOR SOLUTION INTRAVENOUS at 04:17

## 2024-04-14 RX ADMIN — APIXABAN 5 MG: 5 TABLET, FILM COATED ORAL at 08:12

## 2024-04-14 RX ADMIN — METOPROLOL TARTRATE 25 MG: 25 TABLET, FILM COATED ORAL at 04:16

## 2024-04-14 RX ADMIN — IOPAMIDOL 91 ML: 755 INJECTION, SOLUTION INTRAVENOUS at 15:09

## 2024-04-14 RX ADMIN — LEVOTHYROXINE SODIUM 150 MCG: 0.15 TABLET ORAL at 08:12

## 2024-04-14 ASSESSMENT — ACTIVITIES OF DAILY LIVING (ADL)
ADLS_ACUITY_SCORE: 29
ADLS_ACUITY_SCORE: 27
ADLS_ACUITY_SCORE: 29
PREVIOUS_RESPONSIBILITIES: WORK;DRIVING;FINANCES;MEDICATION MANAGEMENT;SHOPPING;LAUNDRY;HOUSEKEEPING;MEAL PREP
ADLS_ACUITY_SCORE: 29
ADLS_ACUITY_SCORE: 29

## 2024-04-14 NOTE — PLAN OF CARE
"BP (!) 167/76 (BP Location: Right arm)   Pulse (!) 133   Temp 99  F (37.2  C) (Oral)   Resp 15   Ht 1.75 m (5' 8.9\")   Wt 84.6 kg (186 lb 6.4 oz)   SpO2 94%   BMI 27.61 kg/m    Neuro: A&Ox4, Indonesian speaking, but speaks little English, and able to make needs known. Blood sugar at 0415 was 154 as he reports being sweaty. An episode of A-fib with HR between 135-144, MD paged and a 25 mg of Metoprolol one time given as per order, still jumping up and down.    Cardiac: A-fib, -135s, slight elevated /76, Afebrile, VSS.   Respiratory: Lungs sound are clear, denies dyspnea with activity, no cough observed, Sat>96% on RA  GI/: Active BS, passing flatus, last BM 4/12, and Voiding spontaneously. No BM this shift.  Diet/appetite: Tolerating regular diet. Denies nausea.  Activity: Up with standby assist    Pain: Denies   Skin: No new deficits noted.  Lines: Piv X 2, TKO   Plan: Continue with ABX, follow plan of care, and notify Team with any change in status    Goal Outcome Evaluation:      Plan of Care Reviewed With: patient, spouse    Overall Patient Progress: improvingOverall Patient Progress: improving           "

## 2024-04-14 NOTE — PLAN OF CARE
"Assumed care   NEURO: A&O x4, Canadian speaking primarily but understands basic english   CARDIAC: Afib, VSS  RESPIRATORY: Satting >92% on RA  GI/: Adequate urine output in urinal, BM x 1 today  DIET/APPETITE: Tolerating regular diet, eating well  ACTIVITY: SBA out of bed, worked well with PT today   PAIN: Denies pain  SKIN: No new deficits noted  Lines / Drains / Airway: L PIV x2 R PIV x1    PLAN: Continue with plan of care, notify primary care team with changes.    Problem: Adult Inpatient Plan of Care  Goal: Plan of Care Review  Description: The Plan of Care Review/Shift note should be completed every shift.  The Outcome Evaluation is a brief statement about your assessment that the patient is improving, declining, or no change.  This information will be displayed automatically on your shift  note.  Outcome: Progressing  Goal: Patient-Specific Goal (Individualized)  Description: You can add care plan individualizations to a care plan. Examples of Individualization might be:  \"Parent requests to be called daily at 9am for status\", \"I have a hard time hearing out of my right ear\", or \"Do not touch me to wake me up as it startles  me\".  Outcome: Progressing  Goal: Absence of Hospital-Acquired Illness or Injury  Outcome: Progressing  Intervention: Identify and Manage Fall Risk  Recent Flowsheet Documentation  Taken 4/14/2024 1600 by Aleisha Arredondo, RN  Safety Promotion/Fall Prevention: activity supervised  Taken 4/14/2024 1200 by Aleisha Arredondo, RN  Safety Promotion/Fall Prevention: activity supervised  Taken 4/14/2024 0800 by Aleisha Arredondo RN  Safety Promotion/Fall Prevention: activity supervised  Intervention: Prevent Skin Injury  Recent Flowsheet Documentation  Taken 4/14/2024 1600 by Aleisha Arredondo, RN  Body Position: position changed independently  Device Skin Pressure Protection: absorbent pad utilized/changed  Taken 4/14/2024 1200 by Aleisha Arredondo, RN  Body Position: position changed " independently  Device Skin Pressure Protection: absorbent pad utilized/changed  Taken 4/14/2024 0800 by Aleisha Arredondo RN  Body Position: position changed independently  Device Skin Pressure Protection: absorbent pad utilized/changed  Intervention: Prevent Infection  Recent Flowsheet Documentation  Taken 4/14/2024 1600 by Aleisha Arredondo RN  Infection Prevention: hand hygiene promoted  Taken 4/14/2024 1200 by Aleisha Arredondo RN  Infection Prevention: hand hygiene promoted  Taken 4/14/2024 0800 by Aleisha Arredondo RN  Infection Prevention: hand hygiene promoted  Goal: Optimal Comfort and Wellbeing  Outcome: Progressing  Goal: Readiness for Transition of Care  Outcome: Progressing     Problem: Heart Failure  Goal: Optimal Coping  Outcome: Progressing  Intervention: Support Psychosocial Response  Recent Flowsheet Documentation  Taken 4/14/2024 1600 by Aleisha Arredondo RN  Supportive Measures: active listening utilized  Taken 4/14/2024 1200 by Aleisha Arredondo RN  Supportive Measures: active listening utilized  Taken 4/14/2024 0800 by Aleisha Arredondo RN  Supportive Measures: active listening utilized  Goal: Optimal Cardiac Output  Outcome: Progressing  Intervention: Optimize Cardiac Output  Recent Flowsheet Documentation  Taken 4/14/2024 1600 by Aleisha Arredondo RN  Environmental Support: calm environment promoted  Taken 4/14/2024 1200 by Aleisha Arredondo RN  Environmental Support: calm environment promoted  Taken 4/14/2024 0800 by Aleisha Arredondo RN  Environmental Support: calm environment promoted  Goal: Stable Heart Rate and Rhythm  Outcome: Progressing  Goal: Optimal Functional Ability  Outcome: Progressing  Intervention: Optimize Functional Ability  Recent Flowsheet Documentation  Taken 4/14/2024 1600 by Aleisha Arredondo RN  Activity Management: activity adjusted per tolerance  Taken 4/14/2024 1200 by Aleisha Arredondo RN  Activity Management: activity adjusted per  tolerance  Taken 4/14/2024 0800 by Aleisha Arredondo RN  Activity Management: activity adjusted per tolerance  Goal: Fluid and Electrolyte Balance  Outcome: Progressing  Goal: Improved Oral Intake  Outcome: Progressing  Goal: Effective Oxygenation and Ventilation  Outcome: Progressing  Intervention: Promote Airway Secretion Clearance  Recent Flowsheet Documentation  Taken 4/14/2024 1600 by Aleisha Arredondo RN  Cough And Deep Breathing: done with encouragement  Activity Management: activity adjusted per tolerance  Taken 4/14/2024 1200 by Aleisha Arredondo RN  Cough And Deep Breathing: done with encouragement  Activity Management: activity adjusted per tolerance  Taken 4/14/2024 0800 by Aleisha Arredondo RN  Cough And Deep Breathing: done with encouragement  Activity Management: activity adjusted per tolerance  Intervention: Optimize Oxygenation and Ventilation  Recent Flowsheet Documentation  Taken 4/14/2024 1600 by Aleisha Arredondo RN  Head of Bed (HOB) Positioning: HOB at 20-30 degrees  Taken 4/14/2024 1200 by Aleisha Arredondo RN  Head of Bed (HOB) Positioning: HOB at 20-30 degrees  Taken 4/14/2024 0800 by Aleisha Arredondo RN  Head of Bed (HOB) Positioning: HOB at 20-30 degrees  Goal: Effective Breathing Pattern During Sleep  Outcome: Progressing  Intervention: Monitor and Manage Obstructive Sleep Apnea  Recent Flowsheet Documentation  Taken 4/14/2024 1600 by Aleisha Arredondo RN  Medication Review/Management: medications reviewed  Taken 4/14/2024 1200 by Aleisha Arredondo RN  Medication Review/Management: medications reviewed  Taken 4/14/2024 0800 by Aleisha Arredondo RN  Medication Review/Management: medications reviewed     Problem: Pain Acute  Goal: Optimal Pain Control and Function  Outcome: Progressing  Intervention: Prevent or Manage Pain  Recent Flowsheet Documentation  Taken 4/14/2024 1600 by Aleisha Arredondo RN  Medication Review/Management: medications reviewed  Taken 4/14/2024  1200 by Aleisha Arredondo RN  Medication Review/Management: medications reviewed  Taken 4/14/2024 0800 by Aleisha Arredondo RN  Medication Review/Management: medications reviewed  Intervention: Optimize Psychosocial Wellbeing  Recent Flowsheet Documentation  Taken 4/14/2024 1600 by Aleisha Arredondo RN  Supportive Measures: active listening utilized  Taken 4/14/2024 1200 by Aleisha Arredondo RN  Supportive Measures: active listening utilized  Taken 4/14/2024 0800 by Aleisha Arredondo RN  Supportive Measures: active listening utilized     Problem: Diabetes  Goal: Optimal Coping  Outcome: Progressing  Intervention: Support Wellbeing and Self-Management Success  Recent Flowsheet Documentation  Taken 4/14/2024 1600 by Aleisha Arredondo RN  Supportive Measures: active listening utilized  Taken 4/14/2024 1200 by Aleisha Arredondo RN  Supportive Measures: active listening utilized  Taken 4/14/2024 0800 by Aleisha Arredondo RN  Supportive Measures: active listening utilized  Goal: Optimal Functional Ability  Outcome: Progressing  Intervention: Optimize Functional Ability  Recent Flowsheet Documentation  Taken 4/14/2024 1600 by Aleisha Arredondo RN  Activity Management: activity adjusted per tolerance  Activity Assistance Provided: assistance, 1 person  Taken 4/14/2024 1200 by Aleisha Arredondo RN  Activity Management: activity adjusted per tolerance  Activity Assistance Provided: assistance, 1 person  Taken 4/14/2024 0800 by Aleisha Arredondo RN  Activity Management: activity adjusted per tolerance  Activity Assistance Provided: assistance, 1 person  Goal: Blood Glucose Level Within Target Range  Outcome: Progressing  Goal: Minimize Risk of Hypoglycemia  Outcome: Progressing     Problem: Dysrhythmia  Goal: Normalized Cardiac Rhythm  Outcome: Progressing     Problem: Fall Injury Risk  Goal: Absence of Fall and Fall-Related Injury  Outcome: Progressing  Intervention: Identify and Manage  Contributors  Recent Flowsheet Documentation  Taken 4/14/2024 1600 by Aleisha Arredondo RN  Medication Review/Management: medications reviewed  Taken 4/14/2024 1200 by Aleisha Arredondo RN  Medication Review/Management: medications reviewed  Taken 4/14/2024 0800 by Aleisha Arredondo RN  Medication Review/Management: medications reviewed  Intervention: Promote Injury-Free Environment  Recent Flowsheet Documentation  Taken 4/14/2024 1600 by Aleisha Arredondo RN  Safety Promotion/Fall Prevention: activity supervised  Taken 4/14/2024 1200 by Aleisha Arredondo RN  Safety Promotion/Fall Prevention: activity supervised  Taken 4/14/2024 0800 by Aleisha Arredondo RN  Safety Promotion/Fall Prevention: activity supervised     Problem: Skin Injury Risk Increased  Goal: Skin Health and Integrity  Outcome: Progressing  Intervention: Optimize Skin Protection  Recent Flowsheet Documentation  Taken 4/14/2024 1600 by Aleisha Arredondo RN  Activity Management: activity adjusted per tolerance  Head of Bed (HOB) Positioning: HOB at 20-30 degrees  Taken 4/14/2024 1200 by Aleisha Arredondo RN  Activity Management: activity adjusted per tolerance  Head of Bed (HOB) Positioning: HOB at 20-30 degrees  Taken 4/14/2024 0800 by Aleisha Arredondo RN  Activity Management: activity adjusted per tolerance  Head of Bed (HOB) Positioning: HOB at 20-30 degrees   Goal Outcome Evaluation:

## 2024-04-14 NOTE — PROGRESS NOTES
"Hospital Medicine Cross Cover Note  EPIC chat paged as below:  \"2562-DC. Pt HR is going up between 135 to 146, can we get a prn to manage the HR? Thanks . Hernan, RM\"    Examination:  BP (!) 164/96 (BP Location: Left arm)   Pulse 111   Temp 98.2  F (36.8  C) (Oral)   Resp 15   Ht 1.75 m (5' 8.9\")   Wt 84.6 kg (186 lb 6.4 oz)   SpO2 96%   BMI 27.61 kg/m     Not otherwise examined.    Assessment  Because of h/o coronary disease and underlying atrial fibrillation and uncontrolled heart rate/pulse, will resume as previously planned in day teams progress note - the metoprolol.    Plan  Metoprolol to start now (25mg) X 1 and 12.5mg BID to start this evening.  Metoprolol 5mg IV Q 15min PRN HR > 110    Riaz Ch MD   "

## 2024-04-14 NOTE — PROGRESS NOTES
Physical Therapy: Orders received. Chart reviewed and discussed with care team.? Physical Therapy not indicated due to discussion with OT. Pt is up ambulating >200ft without an AD and without assist. Appropriate for one therapy discipline to follow to avoid deconditioning during hospital admission.? Defer discharge recommendations to OT.? Will complete orders.

## 2024-04-14 NOTE — PROGRESS NOTES
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 He was admitted on 4/12/2024 with sepsis and afib with RVR.      Today's plan   -Troponin down trending likely were elevated from demand or rapid a-fib   -Morning of 4/14 patient had another fever ordered broad workup and evaluation of fevers   -Ordered 1 3 beta glucan, pro calcitonin, crp levels, ct abdomen, ct head and repeat chest x ray to assess for infectious process, also ordered cmv, hsv and ebv levels to assess for other viral etiology   -Given chest x ray findings would add atypical coverage for respiratory organisms with azithromycin      Severe sepsis  History of ESBL in urine  Fever 102.6 on 4/12 and 100.7 on 4/14  Chest x ray from 4/12 showing left sided interstial infiltrates   Suspect pulmonary infection   Currently on empiric antibiotics, Chest x ray with some interstial infiltrates viral respiratory panel negative, awaiting on ct abdomen 4/14, head ct 4/14 and lower extremity ultrasound   - meropenem (4/13 - P)  - vancomycin IV (4/12 - P)  -Azithromycin 4/14-    - Micro:   - sputum culture ordered  - MRSA nares ordered  - 4/12 COVID/flu/RSV- negative  - 4/12 respiratory viral panel- negative  - 4/12 urine culture- in process  - 4/12 strep pneumo urine antigen- negative  - 4/12 legionella urine antigen- negative  - 4/12 blood cultures x2- in process  -4/14 1 3 beta glucan   -4/14 procalcitonin   -4/14 ordered and awaiting on urine strep and urine legionella      Afib with RVR- improved  Admitted with afib RVR with rates to 160s, improved rates with diltiazem gtt in the ED. Likely secondary to sepsis. Of note, during previous  hospital stay he was discharged with metoprolol tartrate so will resume that and stop diltiazem gtt.   - started diltiazam 120 mg   -Started apixaban 5 mg bid      Most recent TTE from 3/3/2024:   Left ventricular function is normal.The ejection fraction is 60-65%.  Global right ventricular function is normal.  The inferior vena cava is normal.     DEREK now resolved   Creatinine 1.29 on admission from baseline of ~0.8. Likely in setting of sepsis and afib RVR.   - abdominal US above will comment on kidneys   - CTM     Elevated troponin  Likely demand in the setting of afib RVR. No chest pain.    - trend troponin to peak.   -Noted some EKG changes in lateral leads, Would consult cardiology to comment on it      Dispo planning  - PT/OT consults        ____________________________  Chronic/stable/improved:     Hypothyroidism  3/2/2024 TSH mildly elevated at 5.47, free T4 wnl at 1.24. 4/13/2024 TSH 0.32.   - continue levothyroxine 150mcg daily     Cirrhosis related to alcohol c/b HCC s/p s/p DDLT (2018)  Hx of biliary stricture requiring stent, last ERCP 2019  - Continue pta mycophenolate 500mg BID  - Continue pta prednisone 5mg daily   - Continue pta ursodiol 250mg BID     HTN  - holding pta amlodipine 10mg daily in the setting of sepsis  - holding pta hydralazine 25mg BID in the setting of sepsis     CAD s/p PCI to mLAD   - continue pta atorvastatin 40mg daily  - resume pta aspirin 81mg daily (patient was not taking at home, but should be)     Type 2 diabetes  2/6/2024 A1c 9.2%.   - holding pta glargine insulin (written as 28 units, but patient reports taking 20 units only occasionally depending on his blood sugars)  - holding pta metformin 1000mg BID  - MDSSI  - hypoglycemia protocol                 Diet: Low Saturated Fat Na <2400 mg    DVT Prophylaxis: Apixaban for dvt ppx   Cronin Catheter: Not present  Lines: None     Cardiac Monitoring: None  Code Status: Full Code      Clinically Significant Risk Factors           "    # Hypoalbuminemia: Lowest albumin = 3.1 g/dL at 4/13/2024  5:57 AM, will monitor as appropriate  # Coagulation Defect: INR = 1.21 (Ref range: 0.85 - 1.15) and/or PTT = 26 Seconds (Ref range: 22 - 38 Seconds), will monitor for bleeding           # Overweight: Estimated body mass index is 27.61 kg/m  as calculated from the following:    Height as of this encounter: 1.75 m (5' 8.9\").    Weight as of this encounter: 84.6 kg (186 lb 6.4 oz)., PRESENT ON ADMISSION            Disposition Plan     Medically Ready for Discharge:              Festus Carter MD  Hospitalist Service, [address]  Securely message with Ghost (more info)  Text page via [email or web address] Paging/Directory   See signed in provider for up to date coverage information  ______________________________________________________________________    Interval History   Patient is awake and alert, no new symptoms of abdominal pain, difficulty breathing, trouble urinating or malaise     Physical Exam   Vital Signs: Temp: 99.6  F (37.6  C) Temp src: Oral BP: (!) 155/81 Pulse: 111   Resp: 20 SpO2: 94 % O2 Device: None (Room air)    Weight: 186 lbs 6.4 oz    Constitutional: awake, alert, cooperative, no apparent distress, and appears stated age  Eyes: Lids and lashes normal, pupils equal, round and reactive to light, extra ocular muscles intact, sclera clear, conjunctiva normal  ENT: Normocephalic, without obvious abnormality, atraumatic, sinuses nontender on palpation, external ears without lesions, oral pharynx with moist mucous membranes, tonsils without erythema or exudates, gums normal and good dentition.  Hematologic / Lymphatic: no cervical lymphadenopathy  Respiratory: No increased work of breathing, good air exchange, clear to auscultation bilaterally, no crackles or wheezing  Cardiovascular: Normal apical impulse, regular rate and rhythm, normal S1 and S2, no S3 or S4, and no murmur noted  GI: No scars, " normal bowel sounds, soft, non-distended, non-tender, no masses palpated, no hepatosplenomegally  Genitounirinary:   Skin: no bruising or bleeding  Musculoskeletal: There is no redness, warmth, or swelling of the joints.  Full range of motion noted.  Motor strength is 5 out of 5 all extremities bilaterally.  Tone is normal.  Neurologic: Awake, alert, oriented to name, place and time.  Cranial nerves II-XII are grossly intact.  Motor is 5 out of 5 bilaterally.  Cerebellar finger to nose, heel to shin intact.  Sensory is intact.  Babinski down going, Romberg negative, and gait is normal.  Neuropsychiatric: General: normal, calm, and normal eye contact    Medical Decision Making       42 MINUTES SPENT BY ME on the date of service doing chart review, history, exam, documentation & further activities per the note.      Data     I have personally reviewed the following data over the past 24 hrs:    8.6  \   13.3   / 164     N/A N/A N/A /  144 (H)   N/A N/A N/A \     Trop: 120 (HH) BNP: N/A     Procal: N/A CRP: N/A Lactic Acid: 0.9         Imaging results reviewed over the past 24 hrs:   No results found for this or any previous visit (from the past 24 hour(s)).

## 2024-04-14 NOTE — PROGRESS NOTES
04/14/24 1411   Appointment Info   Signing Clinician's Name / Credentials (OT) Torrie BowenOTR/L   Living Environment   People in Home spouse   Current Living Arrangements apartment   Home Accessibility stairs to enter home   Number of Stairs, Main Entrance 7  (pt. lives 2nd floor, no elevator ~7 steps with railing)   Stair Railings, Main Entrance railings safe and in good condition   Transportation Anticipated car, drives self;family or friend will provide   Living Environment Comments pt. lives with spouse, all needs met on one level, tub/showr combo   Self-Care   Usual Activity Tolerance good   Current Activity Tolerance moderate   Equipment Currently Used at Home none   Fall history within last six months no   Activity/Exercise/Self-Care Comment pt. active at baseline, works 4 hrs/day amb. for job   Instrumental Activities of Daily Living (IADL)   Previous Responsibilities work;driving;finances;medication management;shopping;laundry;housekeeping;meal prep   IADL Comments family can A prn with IADLs   General Information   Onset of Illness/Injury or Date of Surgery 04/12/24   Referring Physician Bernabe Rojas MD   Patient/Family Therapy Goal Statement (OT) to keep up my str. 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 He was admitted on 4/12/2024 with sepsis and afib with RVR.   Existing Precautions/Restrictions no known precautions/restrictions   General Observations and Info act. orders; up with A   Cognitive Status Examination   Orientation Status orientation to person, place and time   Visual Perception   Visual Impairment/Limitations WFL   Sensory   Sensory Quick Adds sensation intact   Pain Assessment   Patient Currently in Pain No   Range of Motion Comprehensive   Comment,  General Range of Motion BUE AROM WFL   Strength Comprehensive (MMT)   General Manual Muscle Testing (MMT) Assessment no strength deficits identified   Bathing Assessment/Intervention   Meagher Level (Bathing) minimum assist (75% patient effort)   Comment, (Bathing) per clinical judgement   Upper Body Dressing Assessment/Training   Meagher Level (Upper Body Dressing) set up   Lower Body Dressing Assessment/Training   Meagher Level (Lower Body Dressing) set up;supervision   Grooming Assessment/Training   Meagher Level (Grooming) supervision   Toileting   Meagher Level (Toileting) contact guard assist;set up   Clinical Impression   Criteria for Skilled Therapeutic Interventions Met (OT) Yes, treatment indicated   OT Diagnosis impaired ADLs/mob.   Influenced by the following impairments general weakness/deconditioning   OT Problem List-Impairments impacting ADL activity tolerance impaired;strength   ADL comments/analysis slightly below baseline with ADLs/mob.   Assessment of Occupational Performance 3-5 Performance Deficits   Identified Performance Deficits impaired ADLs/mobility   Planned Therapy Interventions (OT) ADL retraining;strengthening;transfer training;home program guidelines;progressive activity/exercise   Clinical Decision Making Complexity (OT) problem focused assessment/low complexity   Risk & Benefits of therapy have been explained evaluation/treatment results reviewed;care plan/treatment goals reviewed;risks/benefits reviewed;current/potential barriers reviewed;participants voiced agreement with care plan;participants included;patient;spouse/significant other;daughter   OT Total Evaluation Time   OT Eval, Low Complexity Minutes (33623) 10   OT Goals   Therapy Frequency (OT) Daily   OT Predicted Duration/Target Date for Goal Attainment 04/19/24   OT Discharge Planning   OT Plan OT PLAN: act. fabián./end.,stairs, tub/shower transf.,   OT Discharge Recommendation (DC Rec) home with  assist   OT Rationale for DC Rec pt. doing well, has good home support.   OT Brief overview of current status CGA/S with BADLs and functional mob. amb. with/without iv pole   Total Session Time   Total Session Time (sum of timed and untimed services) 10

## 2024-04-14 NOTE — PROGRESS NOTES
Brief Cross Cover Note    Troponin has decreased on recheck. No need for repeat rechecks of trop or EKG unless patient is symptomatic.     Errol Vilchis PA-C   [facility]  Securely message with the Vocera Web Console (learn more here)  Text page via [facility] Paging/Directory

## 2024-04-14 NOTE — PROGRESS NOTES
" NS ADMISSION NOTE    Patient admitted to room 6502 at approximately 2335 via cart from emergency room. Patient was accompanied by spouse and nurse.     Verbal SBAR report received from Sayra ZAMARRIPA prior to patient arrival.     Patient ambulated to bed with stand-by assist. Patient alert and oriented X 4. The patient is not having any pain.  . Admission vital signs: Blood pressure (!) 164/96, pulse 111, temperature 98.2  F (36.8  C), temperature source Oral, resp. rate 15, height 1.75 m (5' 8.9\"), weight 84.6 kg (186 lb 6.4 oz), SpO2 96%. Patient and spouse were oriented to plan of care, call light, bed controls, tv, telephone, bathroom, and visiting hours.     Risk Assessment    The following safety risks were identified during admission: fall. Yellow risk band applied: YES.     Skin Initial Assessment    This writer admitted this patient and completed a full skin assessment and Sergey score in the Adult PCS flowsheet. Appropriate interventions initiated as needed.     Secondary skin check completed by Giuliana MENCHACA RN.    Sergey Risk Assessment  Sensory Perception: 4-->no impairment  Moisture: 4-->rarely moist  Activity: 3-->walks occasionally  Mobility: 3-->slightly limited  Nutrition: 3-->adequate  Friction and Shear: 3-->no apparent problem  Sergey Score: 20  Mattress: Standard gel/foam mattress (IsoFlex, Atmos Air, etc.)  Bed Frame: Standard width and length    Education    Patient has a [place] to Observation order: No  Observation education completed and documented: N/A      Elly Lynne RN      "

## 2024-04-15 ENCOUNTER — APPOINTMENT (OUTPATIENT)
Dept: OCCUPATIONAL THERAPY | Facility: CLINIC | Age: 69
DRG: 871 | End: 2024-04-15
Payer: COMMERCIAL

## 2024-04-15 PROBLEM — R41.3 MEMORY LOSS: Status: ACTIVE | Noted: 2024-04-15

## 2024-04-15 LAB
1,3 BETA GLUCAN SER-MCNC: <31 PG/ML
ALBUMIN SERPL BCG-MCNC: 3.3 G/DL (ref 3.5–5.2)
ALP SERPL-CCNC: 176 U/L (ref 40–150)
ALT SERPL W P-5'-P-CCNC: 13 U/L (ref 0–70)
ANION GAP SERPL CALCULATED.3IONS-SCNC: 12 MMOL/L (ref 7–15)
AST SERPL W P-5'-P-CCNC: 20 U/L (ref 0–45)
BASOPHILS # BLD AUTO: 0 10E3/UL (ref 0–0.2)
BASOPHILS NFR BLD AUTO: 0 %
BILIRUB SERPL-MCNC: 0.8 MG/DL
BUN SERPL-MCNC: 13.5 MG/DL (ref 8–23)
CALCIUM SERPL-MCNC: 8.7 MG/DL (ref 8.8–10.2)
CHLORIDE SERPL-SCNC: 101 MMOL/L (ref 98–107)
CMV DNA SPEC NAA+PROBE-ACNC: NOT DETECTED IU/ML
CREAT SERPL-MCNC: 0.94 MG/DL (ref 0.67–1.17)
CRP SERPL-MCNC: 89.2 MG/L
DEPRECATED HCO3 PLAS-SCNC: 22 MMOL/L (ref 22–29)
EBV DNA SERPL NAA+PROBE-ACNC: NOT DETECTED IU/ML
EGFRCR SERPLBLD CKD-EPI 2021: 87 ML/MIN/1.73M2
EOSINOPHIL # BLD AUTO: 0 10E3/UL (ref 0–0.7)
EOSINOPHIL NFR BLD AUTO: 0 %
ERYTHROCYTE [DISTWIDTH] IN BLOOD BY AUTOMATED COUNT: 13.9 % (ref 10–15)
GLUCOSE BLDC GLUCOMTR-MCNC: 150 MG/DL (ref 70–99)
GLUCOSE BLDC GLUCOMTR-MCNC: 348 MG/DL (ref 70–99)
GLUCOSE BLDC GLUCOMTR-MCNC: 380 MG/DL (ref 70–99)
GLUCOSE SERPL-MCNC: 149 MG/DL (ref 70–99)
HCT VFR BLD AUTO: 42.8 % (ref 40–53)
HGB BLD-MCNC: 13.8 G/DL (ref 13.3–17.7)
HSV1 DNA SPEC QL NAA+PROBE: NEGATIVE
HSV2 DNA SPEC QL NAA+PROBE: NEGATIVE
IGG SERPL-MCNC: 1143 MG/DL (ref 610–1616)
IMM GRANULOCYTES # BLD: 0 10E3/UL
IMM GRANULOCYTES NFR BLD: 0 %
LACTATE SERPL-SCNC: 1.6 MMOL/L (ref 0.7–2)
LYMPHOCYTES # BLD AUTO: 1.1 10E3/UL (ref 0.8–5.3)
LYMPHOCYTES NFR BLD AUTO: 18 %
MCH RBC QN AUTO: 27.9 PG (ref 26.5–33)
MCHC RBC AUTO-ENTMCNC: 32.2 G/DL (ref 31.5–36.5)
MCV RBC AUTO: 87 FL (ref 78–100)
MONOCYTES # BLD AUTO: 0.5 10E3/UL (ref 0–1.3)
MONOCYTES NFR BLD AUTO: 8 %
NEUTROPHILS # BLD AUTO: 4.4 10E3/UL (ref 1.6–8.3)
NEUTROPHILS NFR BLD AUTO: 74 %
NRBC # BLD AUTO: 0 10E3/UL
NRBC BLD AUTO-RTO: 0 /100
OBSERVATION IMP: NEGATIVE
PLATELET # BLD AUTO: 167 10E3/UL (ref 150–450)
POTASSIUM SERPL-SCNC: 4.2 MMOL/L (ref 3.4–5.3)
PROT SERPL-MCNC: 6.4 G/DL (ref 6.4–8.3)
RBC # BLD AUTO: 4.95 10E6/UL (ref 4.4–5.9)
SODIUM SERPL-SCNC: 135 MMOL/L (ref 135–145)
VANCOMYCIN SERPL-MCNC: 10.5 UG/ML
WBC # BLD AUTO: 6 10E3/UL (ref 4–11)

## 2024-04-15 PROCEDURE — 250N000012 HC RX MED GY IP 250 OP 636 PS 637

## 2024-04-15 PROCEDURE — 250N000013 HC RX MED GY IP 250 OP 250 PS 637: Performed by: INTERNAL MEDICINE

## 2024-04-15 PROCEDURE — 83605 ASSAY OF LACTIC ACID: CPT | Performed by: INTERNAL MEDICINE

## 2024-04-15 PROCEDURE — 250N000013 HC RX MED GY IP 250 OP 250 PS 637

## 2024-04-15 PROCEDURE — 250N000013 HC RX MED GY IP 250 OP 250 PS 637: Performed by: STUDENT IN AN ORGANIZED HEALTH CARE EDUCATION/TRAINING PROGRAM

## 2024-04-15 PROCEDURE — 86140 C-REACTIVE PROTEIN: CPT | Performed by: STUDENT IN AN ORGANIZED HEALTH CARE EDUCATION/TRAINING PROGRAM

## 2024-04-15 PROCEDURE — 258N000003 HC RX IP 258 OP 636: Performed by: INTERNAL MEDICINE

## 2024-04-15 PROCEDURE — 250N000011 HC RX IP 250 OP 636: Performed by: INTERNAL MEDICINE

## 2024-04-15 PROCEDURE — 258N000003 HC RX IP 258 OP 636

## 2024-04-15 PROCEDURE — 80202 ASSAY OF VANCOMYCIN: CPT | Performed by: INTERNAL MEDICINE

## 2024-04-15 PROCEDURE — 250N000011 HC RX IP 250 OP 636: Mod: JZ

## 2024-04-15 PROCEDURE — 120N000005 HC R&B MS OVERFLOW UMMC

## 2024-04-15 PROCEDURE — 85025 COMPLETE CBC W/AUTO DIFF WBC: CPT | Performed by: STUDENT IN AN ORGANIZED HEALTH CARE EDUCATION/TRAINING PROGRAM

## 2024-04-15 PROCEDURE — 99255 IP/OBS CONSLTJ NEW/EST HI 80: CPT | Performed by: INTERNAL MEDICINE

## 2024-04-15 PROCEDURE — 36415 COLL VENOUS BLD VENIPUNCTURE: CPT | Performed by: INTERNAL MEDICINE

## 2024-04-15 PROCEDURE — 80053 COMPREHEN METABOLIC PANEL: CPT | Performed by: STUDENT IN AN ORGANIZED HEALTH CARE EDUCATION/TRAINING PROGRAM

## 2024-04-15 PROCEDURE — 99233 SBSQ HOSP IP/OBS HIGH 50: CPT | Performed by: STUDENT IN AN ORGANIZED HEALTH CARE EDUCATION/TRAINING PROGRAM

## 2024-04-15 PROCEDURE — 97530 THERAPEUTIC ACTIVITIES: CPT | Mod: GO | Performed by: OCCUPATIONAL THERAPIST

## 2024-04-15 RX ADMIN — VANCOMYCIN HYDROCHLORIDE 1500 MG: 10 INJECTION, POWDER, LYOPHILIZED, FOR SOLUTION INTRAVENOUS at 19:43

## 2024-04-15 RX ADMIN — APIXABAN 5 MG: 5 TABLET, FILM COATED ORAL at 19:42

## 2024-04-15 RX ADMIN — ASPIRIN 81 MG CHEWABLE TABLET 81 MG: 81 TABLET CHEWABLE at 09:47

## 2024-04-15 RX ADMIN — ATORVASTATIN CALCIUM 40 MG: 40 TABLET, FILM COATED ORAL at 19:42

## 2024-04-15 RX ADMIN — INSULIN ASPART 1 UNITS: 100 INJECTION, SOLUTION INTRAVENOUS; SUBCUTANEOUS at 09:59

## 2024-04-15 RX ADMIN — PREDNISONE 5 MG: 5 TABLET ORAL at 09:47

## 2024-04-15 RX ADMIN — MEROPENEM 2 G: 1 INJECTION, POWDER, FOR SOLUTION INTRAVENOUS at 04:33

## 2024-04-15 RX ADMIN — MEROPENEM 2 G: 1 INJECTION, POWDER, FOR SOLUTION INTRAVENOUS at 14:27

## 2024-04-15 RX ADMIN — MYCOPHENOLATE MOFETIL 500 MG: 500 TABLET, FILM COATED ORAL at 17:46

## 2024-04-15 RX ADMIN — Medication 12.5 MG: at 09:46

## 2024-04-15 RX ADMIN — MEROPENEM 2 G: 1 INJECTION, POWDER, FOR SOLUTION INTRAVENOUS at 22:07

## 2024-04-15 RX ADMIN — LEVOTHYROXINE SODIUM 150 MCG: 0.15 TABLET ORAL at 09:47

## 2024-04-15 RX ADMIN — MYCOPHENOLATE MOFETIL 500 MG: 500 TABLET, FILM COATED ORAL at 09:46

## 2024-04-15 RX ADMIN — INSULIN ASPART 5 UNITS: 100 INJECTION, SOLUTION INTRAVENOUS; SUBCUTANEOUS at 18:46

## 2024-04-15 RX ADMIN — APIXABAN 5 MG: 5 TABLET, FILM COATED ORAL at 09:47

## 2024-04-15 RX ADMIN — Medication 12.5 MG: at 19:42

## 2024-04-15 RX ADMIN — DILTIAZEM HYDROCHLORIDE 120 MG: 120 CAPSULE, COATED, EXTENDED RELEASE ORAL at 09:47

## 2024-04-15 RX ADMIN — AZITHROMYCIN DIHYDRATE 250 MG: 250 TABLET ORAL at 09:47

## 2024-04-15 ASSESSMENT — ACTIVITIES OF DAILY LIVING (ADL)
ADLS_ACUITY_SCORE: 29
ADLS_ACUITY_SCORE: 29
ADLS_ACUITY_SCORE: 26
ADLS_ACUITY_SCORE: 29
ADLS_ACUITY_SCORE: 22
ADLS_ACUITY_SCORE: 29
ADLS_ACUITY_SCORE: 26
ADLS_ACUITY_SCORE: 22
ADLS_ACUITY_SCORE: 29
ADLS_ACUITY_SCORE: 29
ADLS_ACUITY_SCORE: 26
ADLS_ACUITY_SCORE: 29
ADLS_ACUITY_SCORE: 22
ADLS_ACUITY_SCORE: 26

## 2024-04-15 NOTE — PROGRESS NOTES
6502-IN. Pt spike of a temp of 101.7, has no prn Tylenol at this time. Can we have a prn Tylenol for temp/pain since culture has been collected, please?  Thanks  DALLIN Becerra  [phone]  Paged: Dr. Metzger, Francy @[phone]

## 2024-04-15 NOTE — PROGRESS NOTES
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 He was admitted on 4/12/2024 with sepsis and afib with RVR.      Today's plan   -Noted fever of 102 farenheit last night   -Noted gradual fall in CRP   -noted negative CMV levels, negative strep and legionella urine test      Severe sepsis  History of ESBL in urine  Fever 102.6 on 4/12 and 100.7 on 4/14  Chest x ray from 4/12 showing left sided interstial infiltrates   Suspect pulmonary infection   Currently on empiric antibiotics, Chest x ray with some interstial infiltrates viral respiratory panel negative, awaiting on ct abdomen 4/14, head ct 4/14 and lower extremity ultrasound   - meropenem (4/13 - P)  - vancomycin IV (4/12 - P)  -Azithromycin (4/14- P)    - Micro:   - sputum culture ordered  - MRSA nares ordered  - 4/12 COVID/flu/RSV- negative  - 4/12 respiratory viral panel- negative  - 4/12 urine culture- in process  - 4/12 strep pneumo urine antigen- negative  - 4/12 legionella urine antigen- negative  - 4/12 blood cultures x2- in process  -4/14 1 3 beta glucan   -4/14 procalcitonin      Afib with RVR- improved  Admitted with afib RVR with rates to 160s, improved rates with diltiazem gtt in the ED. Likely secondary to sepsis. Of note, during previous hospital stay he was discharged with metoprolol tartrate so will resume that and stop diltiazem gtt.   - started diltiazam 120 mg   -Started apixaban 5 mg bid      Most recent TTE from 3/3/2024:   Left ventricular function is normal.The ejection fraction is 60-65%.  Global right ventricular function is normal.  The inferior vena cava is normal.     DEREK now resolved   Creatinine 1.29 on  admission from baseline of ~0.8. Likely in setting of sepsis and afib RVR.   - abdominal US above will comment on kidneys   - CTM     Elevated troponin now resolving   Likely demand in the setting of afib RVR. No chest pain.    - trend troponin to peak.   -Noted some EKG changes in lateral leads, Would consult cardiology to comment on it      Dispo planning  - PT/OT consults        ____________________________  Chronic/stable/improved:     Hypothyroidism  3/2/2024 TSH mildly elevated at 5.47, free T4 wnl at 1.24. 4/13/2024 TSH 0.32.   - continue levothyroxine 150mcg daily     Cirrhosis related to alcohol c/b HCC s/p s/p DDLT (2018)  Hx of biliary stricture requiring stent, last ERCP 2019  - Continue pta mycophenolate 500mg BID  - Continue pta prednisone 5mg daily   - Continue pta ursodiol 250mg BID     HTN  - holding pta amlodipine 10mg daily in the setting of sepsis  - holding pta hydralazine 25mg BID in the setting of sepsis     CAD s/p PCI to mLAD   - continue pta atorvastatin 40mg daily  - resume pta aspirin 81mg daily (patient was not taking at home, but should be)     Type 2 diabetes  2/6/2024 A1c 9.2%.   - holding pta glargine insulin (written as 28 units, but patient reports taking 20 units only occasionally depending on his blood sugars)  - holding pta metformin 1000mg BID  - MDSSI  - hypoglycemia protocol                 Diet: Low Saturated Fat Na <2400 mg    DVT Prophylaxis: Apixaban for dvt ppx   Cronin Catheter: Not present  Lines: None     Cardiac Monitoring: None  Code Status: Full Code      Clinically Significant Risk Factors              # Hypoalbuminemia: Lowest albumin = 3.1 g/dL at 4/13/2024  5:57 AM, will monitor as appropriate  # Coagulation Defect: INR = 1.21 (Ref range: 0.85 - 1.15) and/or PTT = 26 Seconds (Ref range: 22 - 38 Seconds), will monitor for bleeding           # Overweight: Estimated body mass index is 27.61 kg/m  as calculated from the following:    Height as of this encounter:  "1.75 m (5' 8.9\").    Weight as of this encounter: 84.6 kg (186 lb 6.4 oz)., PRESENT ON ADMISSION            Disposition Plan     Medically Ready for Discharge:              Festus Carter MD  Hospitalist Service, GOLD TEAM 7  [facility]  Securely message with [employer] (more info)  Text page via [facility] Paging/Directory   See signed in provider for up to date coverage information  ______________________________________________________________________    Interval History   Patient is awake and alert, her daughter is at bedside, patient spiked multiple fevers last night, no new abdominal pain, difficulty breathing or malaise     Physical Exam   Vital Signs: Temp: 100  F (37.8  C) Temp src: Oral BP: (!) 150/89 Pulse: 85   Resp: 20 SpO2: 95 % O2 Device: None (Room air)    Weight: 186 lbs 6.4 oz    Constitutional: awake, alert, cooperative, no apparent distress, and appears stated age  Eyes: Lids and lashes normal, pupils equal, round and reactive to light, extra ocular muscles intact, sclera clear, conjunctiva normal  ENT: Normocephalic, without obvious abnormality, atraumatic, sinuses nontender on palpation, external ears without lesions, oral pharynx with moist mucous membranes, tonsils without erythema or exudates, gums normal and good dentition.  Hematologic / Lymphatic: no cervical lymphadenopathy  Respiratory: No increased work of breathing, good air exchange, clear to auscultation bilaterally, no crackles or wheezing  Cardiovascular: Normal apical impulse, regular rate and rhythm, normal S1 and S2, no S3 or S4, and no murmur noted  GI: No scars, normal bowel sounds, soft, non-distended, non-tender, no masses palpated, no hepatosplenomegally  Genitounirinary:   Skin: no bruising or bleeding  Musculoskeletal: There is no redness, warmth, or swelling of the joints.  Full range of motion noted.  Motor strength is 5 out of 5 all extremities bilaterally.  Tone is " normal.  Neurologic: Awake, alert, oriented to name, place and time.  Cranial nerves II-XII are grossly intact.  Motor is 5 out of 5 bilaterally.  Cerebellar finger to nose, heel to shin intact.  Sensory is intact.  Babinski down going, Romberg negative, and gait is normal.  Neuropsychiatric: General: normal, calm, and normal eye contact    Medical Decision Making       41 MINUTES SPENT BY ME on the date of service doing chart review, history, exam, documentation & further activities per the note.      Data     I have personally reviewed the following data over the past 24 hrs:    6.0  \   13.8   / 167     135 101 13.5 /  150 (H)   4.2 22 0.94 \     ALT: 13 AST: 20 AP: 176 (H) TBILI: 0.8   ALB: 3.3 (L) TOT PROTEIN: 6.4 LIPASE: N/A     Procal: N/A CRP: 89.20 (H) Lactic Acid: 1.6         Imaging results reviewed over the past 24 hrs:   Recent Results (from the past 24 hour(s))   US Lower Extremity Venous Duplex Bilateral    Narrative    EXAMINATION: DOPPLER VENOUS ULTRASOUND OF BILATERAL LOWER EXTREMITIES,  4/14/2024 12:20 PM     COMPARISON: None.    HISTORY: to assess for dvt    TECHNIQUE:  Gray-scale evaluation with compression, spectral flow and  color Doppler assessment of the deep venous system of both legs from  groin to knee, and then at the ankles.    FINDINGS:  In both lower extremities, the common femoral, femoral, popliteal,  peroneal, and posterior tibial veins demonstrate normal  compressibility and blood flow.      Impression    IMPRESSION:  No evidence of deep venous thrombosis in either lower extremity.    I have personally reviewed the examination and initial interpretation  and I agree with the findings.    CHAPARRITA MANRIQUEZ DO         SYSTEM ID:  [id number]   CT Head w/o Contrast    Narrative    EXAM: CT HEAD W/O CONTRAST  4/14/2024 12:40 PM     HISTORY:  unclear fever source, Some headache no focal neuro symptoms,  please assess for fever source?       COMPARISON:  None    TECHNIQUE: Using  multidetector thin collimation helical acquisition  technique, axial, coronal and sagittal CT images from the skull base  to the vertex were obtained without intravenous contrast.   (topogram) image(s) also obtained and reviewed.    FINDINGS:  No acute intracranial hemorrhage, mass effect, or midline shift. No  acute loss of gray-white matter differentiation in the cerebral  hemispheres. Ventricles are proportionate to the cerebral sulci. Clear  basal cisterns. Encephalomalacia of the left parasagittal occipital  lobe. Patchy hypoattenuation of the periventricular and subcortical  white matter which is nonspecific but favored to represent chronic  small vessel ischemic disease given patient's age. Moderate  generalized parenchymal atrophy.    The bony calvaria and the bones of the skull base are normal. The  visualized portions of the paranasal sinuses and mastoid air cells are  clear. Grossly normal orbits.       Impression    IMPRESSION:   1. No acute intracranial pathology.   2. Findings suggestive of chronic small vessel ischemic disease and  prior left occipital infarct.    I have personally reviewed the examination and initial interpretation  and I agree with the findings.    SURENDRA RAIN MD         SYSTEM ID:  [id number]   CT Abdomen Pelvis w/o Contrast    Narrative    CT abdomen pelvis without contrast    INDICATION: Fevers    COMPARISON: None    FINDINGS: No contrast. Coronary artery and thoracic aortic  atherosclerotic calcifications in the lower chest. Mitral annular  calcifications. Please correlate for mitral valve disorder the left  atrium is mildly prominent. Calcified granuloma in the posterior left  lung base. Patchy mosaic attenuation in the lower lobes may be related  to small airway or small vessel disease without focal consolidation.  Within the abdomen and pelvis, noncontrast appearances of the liver  and spleen are normal. Cholecystectomy. Atherosclerotic calcification  in the abdominal  aorta as well as the hepatic artery. Left renal an  right renal artery origin atherosclerotic calcifications also present.  Mild diverticular disease the large intestine. Sigmoid colon is mild  to moderately redundant. No enlarged inguinal or deep pelvic no other  mesenteric or retroperitoneal lymph nodes.  Pancreas is mildly atrophic. Adrenals and kidneys appear unremarkable.  Scattered calcifications in the distal right renal artery. No fluid  collection organized inflammatory change in the abdomen or pelvis.  Urinary bladder unremarkable. Iliofemoral atherosclerotic  calcifications bilaterally.    Bone detail shows minimal dextrocurvature of the lumbar spine.  Degenerative changes in the thoracolumbar spine. Mild osteopenia.  No free fluid. No free air.      Impression    IMPRESSION: Small early/small vessel disease in the lung bases without  focal consolidation. No obvious acute inflammatory process in the  abdomen or pelvis by CT. Diffuse atherosclerosis. Mitral annular  calcifications and left atrial enlargement, please correlate for  mitral valve disorder. Cholecystectomy.    ZACK PATTERSON MD         SYSTEM ID:  [id number]   CT Chest w Contrast    Narrative    EXAMINATION: Chest CT  4/14/2024 3:17 PM    CLINICAL HISTORY: 2 seperate fevers with interstial infilterates on  chest x ray on admisson, pt with h/o liver transplant    COMPARISON: None available.    TECHNIQUE: CT imaging obtained through the chest with contrast. Axial,  coronal, and sagittal reconstructions and axial MIP reformatted images  are reviewed.     CONTRAST: 91ml isovue 370    FINDINGS:  Lungs: The trachea and central airways are patent. No pneumothorax or  pleural effusion. Lower lobe predominant peribronchovascular  groundglass opacities. No suspicious pulmonary nodule. Calcified  granuloma in the left lower lobe.    Mediastinum: The visualized thyroid gland is unremarkable. The heart  size is within normal limits. No pericardial  effusion. Extensive  calcifications of the LAD. Calcifications of aorta. The ascending  aorta caliber is normal. Main pulmonary caliber mildly enlarged at 3.4  cm. No central pulmonary embolus. Normal appearance and configuration  of the great vessels off of the aortic arch. No suspicious  mediastinal, hilar, or axillary lymph nodes. Nonspecific  circumferential esophageal wall thickening which can be seen in the  setting of esophagitis.     Bones and soft tissues: No suspicious bone findings. Degenerative  changes of the spine.    Upper Abdomen: Multifocal wedge shaped hypoenhancement in the  bilateral kidneys, greatest on the right. The remaining visualized  upper abdomen is unremarkable. Cholecystectomy. Mild splenic venous  varices.      Impression    IMPRESSION:   1. Lower lobe predominant peribronchovascular groundglass opacities,  likely representing infectious/inflammatory process.  2. Multifocal wedge-shaped hypoenhancement in the bilateral kidneys,  most pronounced on the right, concerning for pyelonephritis. Recommend  clinical correlation with urinalysis.  3. Minimal pulmonary artery enlargement which may indicate pulmonary  artery hypertension.    I have personally reviewed the examination and initial interpretation  and I agree with the findings.    ZACK PATTERSON MD         SYSTEM ID:  [id number]   US Renal Complete Non-Vascular    Narrative    Complete renal nonvascular ultrasound    INDICATION: Concern for pyelonephritis    COMPARISON: CT earlier today    FINDINGS: Right kidney 11.2 cm in length. Left kidney 12.6 cm in  length. Normal cortical echotexture. Good cortical medullary  differentiation bilaterally. No cystic mass. No solid mass. No stone.  No hydronephrosis. Urinary bladder not visualized likely due to bowel  gas blocking or recent emptying if applicable.      Impression    IMPRESSION: Normal bilateral renal ultrasound    ZACK PATTERSON MD         SYSTEM ID:  [id number]

## 2024-04-15 NOTE — PLAN OF CARE
"Vital signs:  Temp: (!) 102  F (38.9  C) (Patient refused Tylenol. A  cold wet wrag applied to forehead) Temp src: Oral BP: (!) 160/76 Pulse: 82   Resp: 16 SpO2: 96 % O2 Device: None (Room air)   Height: 175 cm (5' 8.9\") Weight: 84.6 kg (186 lb 6.4 oz)  Estimated body mass index is 27.61 kg/m  as calculated from the following:    Height as of this encounter: 1.75 m (5' 8.9\").    Weight as of this encounter: 84.6 kg (186 lb 6.4 oz).  Neuro: A&Ox4, Romanian speaking, but speaks little English, and able to make needs known. Wife at bedside, slept over during the night. Patient spiked a Temp of 101.7, MD paged , and a prn Tylenol ordered. A dose of Tylenol given, pt brakes fever T 99.3 degree, reports being sweaty. However, patient called daughter and told her that his doctor had once told him not to take tylenol after his liver transplant. Patient,s daughter requested that we should not given Tylenol anymore until they clarify from the Team, requested granted. At approximately 0540, patient spiked another temp of 102 degree, refused tylenol, cold packs applied to forehead and axillaries. Patient also spikes sepsis protocol, Lactic ordered but came back normal.  Cardiac: A-fib, HR 90s -110s, slight elevated /76, Afebrile, VSS.      Respiratory: Lungs sound are clear, denies dyspnea with activity, no cough observed, Sat>96% on RA  GI/: Active BS, passing flatus, last BM 4/14, and Voiding spontaneously, uses urinal at bedside. No BM this shift.  Diet/appetite: Tolerating regular diet. Denies nausea.  Activity: Up with standby assist    Pain: Denies   Skin: No new deficits noted.  Lines: Piv X 2, TKO   Plan: Continue with ABX, follow plan of care, and notify Team with any change in status   Problem: Adult Inpatient Plan of Care  Goal: Readiness for Transition of Care  Outcome: Progressing  Flowsheets (Taken 4/15/2024 9856)  Anticipated Changes Related to Illness: none  Transportation Anticipated:   car, drives " self   family or friend will provide  Intervention: Mutually Develop Transition Plan  Recent Flowsheet Documentation  Taken 4/15/2024 0557 by Elly Lynne RN  Anticipated Changes Related to Illness: none  Transportation Anticipated:   car, drives self   family or friend will provide     Problem: Heart Failure  Goal: Optimal Coping  Outcome: Progressing  Intervention: Support Psychosocial Response  Recent Flowsheet Documentation  Taken 4/15/2024 0400 by Elly Lynne RN  Supportive Measures:   active listening utilized   decision-making supported  Taken 4/15/2024 0000 by Elly Lynne RN  Supportive Measures:   active listening utilized   decision-making supported  Taken 4/14/2024 2100 by Elly Lynne RN  Supportive Measures:   active listening utilized   decision-making supported   Goal Outcome Evaluation: Progressing

## 2024-04-15 NOTE — CONSULTS
[facility]    Division of Infectious Diseases and International Medicine    TRANSPLANT INFECTIOUS DISEASE INPATIENT CONSULTATION NOTE   Patient:  Loy Limon, Date of birth 1955, Medical record number [id number]  Referred by: No ref. provider found   Reason for Consult: Fever       Assessment and Recommendations   Recommendations  Discontinue azithromycin  Continue vancomycin and meropenem for today pending culture results  If he continues to fever without any clear infectious source and negative cultures, would sending Karius testing tomorrow.       Transplant infectious diseases will continue to follow with you.     I spent >80 minutes in direct patient care or coordinating patient care, >50% of which was spent on the fine in direct patient care activities.     Alessandra Capone MD  [phone]    Assessment  Mr. 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 He was admitted on 4/13 with unexplained fevers, for which we are being consulted.     Fevers, sepsis  History of ESBL E coli pyelonephritis, resolved  At this point, no clear etiology of ongoing fevers. CT C/A/P relatively unremarkable. In fact, the CT of his chest from admission looks actually looks improved from the CT chest on 3/22/25. UA relatively unremarkable and urine culture with mixed urogenital luc. Blood cultures NGTD. Regarding exposure history, he is [place]end[place], but negative antibodies in 2018. He did travel to [facility] in Feb, but only stayed with family in the city and was without animal exposure.  On the differential would be Listeria (does eat Mexican cheese), which should be detected on routine blood cultures. No known tickbite exposure (and is a bit early in the season). Doesn't have animals. No undercooked meat (in fact he doesn't  like to eat meat). At this point, if cultures remain negative tomorrow would send Diamond to determine if we are missing any pathogens.     Other Infectious Disease issues include:  - QTc interval: 502 (4/13/24)  - Bacterial prophylaxis: None  - Pneumocystis prophylaxis: Not applicable  - Serostatus & viral prophylaxis: CMV D-/R+; EBV D+/R+   - Fungal prophylaxis: Not applicable  - Isolation status: Contact (ESBL history)  - Code status: Full Code     Recent Labs   Lab Test 04/12/24  2117   IGG 1,143          Prior infectious diseases issues   + Quant 7/19/2018. Was initially started on [id number]/kg PO x4 months on 8/21/2018 (end date ~12/20/2024) but only completed 2.5 months worth. On ID clinic visit with Dr Clancy on 2/5/2019, was transitioned over to INH 300mg daily (due to rifampin DDI with transplant immunosuppressants) which he took for an additional ~2 months with end date on ~4/9/2019.   ESBL E coli Pyelonephritis: Admitted from 2/28-3/9 with ESBL E coli pyelonephritis. Treated with 14 days of ertapenem (3/1/2024-3/14/2024).    CAP: Occurred during 2/28-3/9 admission. BAL negative for fungal etiology. Given azithro x3 days and ertapenem (see above). AFB x3 negative.        History of Present Illness:   Mr. 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 He was admitted on 4/13 with unexplained fevers, for which we are being consulted.     He presented to the hospital with on 4/13 with vomiting and whole body shaking. He also noted increased urinary retention. Presented to the ED, where he was found to be febrile to 102.6. PCT was 0.55 and lacate 2.1. Was started on vanc and pip/tazo, which was transitioned to vanc and meropenem upon admission.    Yesterday, remained febrile to 101. Procal was re-checked and noted to be 30.8. CT C/A/P showed known  peribronchovascular GGOs in lungs. Multifocal wedge-shaped hypoenhancement of bilateral kidneys were noted on CT chest read but not A/P. Renal US normal.     Prior Antibiotics  Vancomycin: 4/14-current  Meropenem: 4/13-current  Azithro: 4/14-current  TMP/SMX: 3/22- 3/29  Ertapenem: 3/1-3/24    Relevant Labs/Imaging   Microbiology Lab  4/4: Blood cultures NGTD   4/12: Blood cultures NGTD  4/12: Urine culture with urogenital luc (UA with small nitrites/LE)  4/12: Legionella/S pneumo/RVP negative     Imaging   4/14: Renal US normal     4/14: CT C/A/P  1. Lower lobe predominant peribronchovascular groundglass opacities,  likely representing infectious/inflammatory process.  2. Multifocal wedge-shaped hypoenhancement in the bilateral kidneys,  most pronounced on the right, concerning for pyelonephritis. Recommend  clinical correlation with urinalysis.  3. Minimal pulmonary artery enlargement which may indicate pulmonary  artery hypertension.  4. Small early/small vessel disease in the lung bases without  focal consolidation. No obvious acute inflammatory process in the abdomen or pelvis by CT. Diffuse atherosclerosis. Mitral annular calcifications and left atrial enlargement, please correlate for mitral valve disorder. Cholecystectomy.       Other Medical History:     Attempt was made to collect past, family and social history during this encounter,  this information was reviewed with the patient and updated    Allergies Patient has no known allergies.    Past Medical History  No past medical history on file. PastSurgical History   has no past surgical history on file.   Family History  No family history on file. Social History  He    Notable Exposures Listed below if pertinent    Vaccination History:  Immunization History   Administered Date(s) Administered    COVID-19 12+ (2023-24) (Pfizer) 03/20/2024    COVID-19 Bivalent 12+ (Pfizer) 10/26/2022, 05/09/2023    COVID-19 Monovalent 18+ (Moderna) 03/01/2021, 03/30/2021,  "09/21/2021, 04/26/2022             Physical Exam:     VITAL SIGNS:  Blood pressure 137/72, pulse 79, temperature 98.9  F (37.2  C), temperature source Oral, resp. rate 18, height 1.75 m (5' 8.9\"), weight 84.6 kg (186 lb 6.4 oz), SpO2 95%.     GENERAL APPEARANCE: Not in acute distress    PHYSICAL EXAM:   Eyes:     no ptosis, no discharge, no scleral icterus  Mouth, Throat:     mucous membranes moist  Cardiovascular:    Inspection: No Cyanosis, JVD not elevated   Auscultation:  S1, S2 normal, regular rate and rhythm  Respiratory:     Inspection: Not in respiratory distress, Chest expansion symmetrical   Auscultation: 4 point auscultation done clear to auscultation bilaterally, no wheezes, no rales, and no rhonchi  Gastrointestinal:      soft, non-tender; bowel sounds normal; no masses  Musculoskeletal:     no elbow wrist knee or ankle tenderness, deformity or swelling, no quadriceps calf or upper arm muscular tenderness noted  Skin:     Dry and intact  Neurologic:     Higher Mental Function: Conversant, AOx4   Facial asymmetry grossly absent   is ambulatory  Psychiatric:     Appropriate           Laboratory Data:   Metabolic Studies       Recent Labs   Lab Test 04/15/24  0959 04/15/24  0439 04/14/24  1253 04/14/24  1134 04/14/24  0840 04/12/24  2121 04/12/24  2117   NA  --  135  --  133*  --    < > 136   POTASSIUM  --  4.2  --  4.2  --    < > 3.9   CHLORIDE  --  101  --  103  --    < > 106   CO2  --  22  --  19*  --    < > 17*   ANIONGAP  --  12  --  11  --    < > 13   BUN  --  13.5  --  15.9  --    < > 22.4   CR  --  0.94  --  0.89  --    < > 1.29*   GFRESTIMATED  --  87  --  >90  --    < > 60*   * 149*   < > 304*  --    < > 207*   YELENA  --  8.7*  --  8.6*  --    < > 8.7*   MAG  --   --   --   --   --   --  1.7   LACT  --  1.6  --   --  0.9   < >  --    PCAL  --   --   --  30.80*  --   --  0.55*    < > = values in this interval not displayed.       Hepatic Studies    Recent Labs   Lab Test 04/15/24  0439 " "04/14/24  1134 04/13/24  0557   BILITOTAL 0.8 1.0 0.6   ALKPHOS 176* 180* 182*   PROTTOTAL 6.4 6.0* 5.6*   ALBUMIN 3.3* 3.2* 3.1*   AST 20 19 30   ALT 13 19 25       Hematology Studies      Recent Labs   Lab Test 04/15/24  0439 04/14/24  1134 04/13/24  0557 04/12/24 2117   WBC 6.0 8.6 9.1 5.5   HGB 13.8 13.3 11.8* 14.2   HCT 42.8 40.9 37.4* 43.2    164 183 180       Arterial Blood Gas Testing    Recent Labs   Lab Test 04/12/24 2131   O2PER 21        Medication levels  No lab results found.    Invalid input(s): \"AMIK\"    No results found for: \"ACD4\"    Inflammatory Markers  No lab results found.    Invalid input(s): \"AUTO\", \"WESR\"    Immune Globulin Studies     Recent Labs   Lab Test 04/12/24 2117   IGG 1,143       Pancreatitis testing    Recent Labs   Lab Test 04/13/24  0029   LIPASE 19       Gout Labs    No lab results found.    Clotting Studies    Recent Labs   Lab Test 04/13/24 0557 04/12/24 2117   INR 1.21* 1.07   PTT  --  26         Markers  No lab results found.    Invalid input(s): \"FETOPROTEIN\", \"SERUM\", \"AFP\"    Autoimmune Testing  No lab results found.    Invalid input(s): \"PRO3\", \"C3\", \"C4\", \"VASPAN\"    Thyroid Studies     Recent Labs   Lab Test 04/13/24  0029   TSH 0.32       Urine Studies     Recent Labs   Lab Test 04/12/24 2134   URINEPH 6.0   NITRITE Negative   LEUKEST Small*   WBCU 79*       CSF testing   No lab results found.    Invalid input(s): \"CADAM\", \"EVPCR\", \"ENTPCR\", \"ENTEROVIRUS\"    Body fluid stats  No lab results found.    Microbiology:  Fungal testing  No lab results found.    Invalid input(s): \"HIFUN\", \"FUNGL\"    Culture   Date Value Ref Range Status   04/14/2024 No growth after 12 hours  Preliminary   04/14/2024 No growth after 12 hours  Preliminary   04/12/2024 >100,000 CFU/mL Mixture of Urogenital Liane  Final   04/12/2024 No growth after 2 days  Preliminary   04/12/2024 No growth after 2 days  Preliminary   (    Last check of C difficile  No results found for: " "\"CDBPCT\"    Infection Studies to assess Diarrhea No lab results found.    Virology:  Coronavirus-19 testing    Recent Labs   Lab Test 04/12/24  2330   [id number] Negative       Respiratory virus testing    Recent Labs   Lab Test 04/12/24  2330   IFLUA Not Detected   INFZA Negative   FLUAH1 Not Detected   ZX1227 Not Detected   FLUAH3 Not Detected   IFLUB Not Detected   INFZB Negative   PIV1 Not Detected   PIV2 Not Detected   PIV3 Not Detected   PIV4 Not Detected   IRSV Negative   RSVA Not Detected   RSVB Not Detected   HMPV Not Detected   ADENOV Not Detected   OROPEZA Not Detected       No results found for: \"CMVIGG\", \"CMVM\", \"CMVIM\", \"CMIG\", \"CMVG\", \"CMIGG\", \"CMIM\", \"CMVIGM\", \"CMLTX\", \"HSVG1\", \"HSVG2\", \"HSVTP1\", \"YC2563\", \"HS12M\", \"HS12GR\", \"HS1GR\", \"HS2GR\", \"HSIM\", \"HSIG\", \"HSIGR\", \"HSVIGMAB\", \"VZVIGG\", \"VARICZOSAB\"  No results found for: \"EBVCAG\", \"EBIG2\", \"EBIGM\", \"EBVIGG\", \"EBIGG\", \"EBVAGN\", \"FE5761\", \"TOXG\"  No results found for: \"H1IGG\", \"H2IGG\", \"EBVCAM\"    CMV viral loads    Recent Labs   Lab Test 04/14/24  1256   CMVQNT Not Detected       CMV resistance testing  No lab results found.    No results found for: \"H6RES\"    No results found for: \"EBRES\"    No results found for: \"66597\", \"BKRES\"    Parvovirus Testing  No lab results found.    Invalid input(s): \"PRVRES\"    Adenovirus Testing  No lab results found.    Invalid input(s): \"ADENAB\", \"ADENOVIRUS\", \"ADQT\"    Hepatitis B Testing   No lab results found.   No results found for: \"HCVAB\", \"HQTG\", \"HCGENO\", \"HCPCR\", \"HQTRNA\", \"HEPRNA\", \"CRYOG\"    Imaging:  Recent Results (from the past 48 hour(s))   US Lower Extremity Venous Duplex Bilateral    Narrative    EXAMINATION: DOPPLER VENOUS ULTRASOUND OF BILATERAL LOWER EXTREMITIES,  4/14/2024 12:20 PM     COMPARISON: None.    HISTORY: to assess for dvt    TECHNIQUE:  Gray-scale evaluation with compression, spectral flow and  color Doppler assessment of the deep venous system of both legs from  groin to knee, and " then at the ankles.    FINDINGS:  In both lower extremities, the common femoral, femoral, popliteal,  peroneal, and posterior tibial veins demonstrate normal  compressibility and blood flow.      Impression    IMPRESSION:  No evidence of deep venous thrombosis in either lower extremity.    I have personally reviewed the examination and initial interpretation  and I agree with the findings.    CHAPARRITA MANRIQUEZ DO         SYSTEM ID:  [id number]   CT Head w/o Contrast    Narrative    EXAM: CT HEAD W/O CONTRAST  4/14/2024 12:40 PM     HISTORY:  unclear fever source, Some headache no focal neuro symptoms,  please assess for fever source?       COMPARISON:  None    TECHNIQUE: Using multidetector thin collimation helical acquisition  technique, axial, coronal and sagittal CT images from the skull base  to the vertex were obtained without intravenous contrast.   (topogram) image(s) also obtained and reviewed.    FINDINGS:  No acute intracranial hemorrhage, mass effect, or midline shift. No  acute loss of gray-white matter differentiation in the cerebral  hemispheres. Ventricles are proportionate to the cerebral sulci. Clear  basal cisterns. Encephalomalacia of the left parasagittal occipital  lobe. Patchy hypoattenuation of the periventricular and subcortical  white matter which is nonspecific but favored to represent chronic  small vessel ischemic disease given patient's age. Moderate  generalized parenchymal atrophy.    The bony calvaria and the bones of the skull base are normal. The  visualized portions of the paranasal sinuses and mastoid air cells are  clear. Grossly normal orbits.       Impression    IMPRESSION:   1. No acute intracranial pathology.   2. Findings suggestive of chronic small vessel ischemic disease and  prior left occipital infarct.    I have personally reviewed the examination and initial interpretation  and I agree with the findings.    SURENDRA RAIN MD         SYSTEM ID:  [id number]   CT Abdomen  Pelvis w/o Contrast    Narrative    CT abdomen pelvis without contrast    INDICATION: Fevers    COMPARISON: None    FINDINGS: No contrast. Coronary artery and thoracic aortic  atherosclerotic calcifications in the lower chest. Mitral annular  calcifications. Please correlate for mitral valve disorder the left  atrium is mildly prominent. Calcified granuloma in the posterior left  lung base. Patchy mosaic attenuation in the lower lobes may be related  to small airway or small vessel disease without focal consolidation.  Within the abdomen and pelvis, noncontrast appearances of the liver  and spleen are normal. Cholecystectomy. Atherosclerotic calcification  in the abdominal aorta as well as the hepatic artery. Left renal an  right renal artery origin atherosclerotic calcifications also present.  Mild diverticular disease the large intestine. Sigmoid colon is mild  to moderately redundant. No enlarged inguinal or deep pelvic no other  mesenteric or retroperitoneal lymph nodes.  Pancreas is mildly atrophic. Adrenals and kidneys appear unremarkable.  Scattered calcifications in the distal right renal artery. No fluid  collection organized inflammatory change in the abdomen or pelvis.  Urinary bladder unremarkable. Iliofemoral atherosclerotic  calcifications bilaterally.    Bone detail shows minimal dextrocurvature of the lumbar spine.  Degenerative changes in the thoracolumbar spine. Mild osteopenia.  No free fluid. No free air.      Impression    IMPRESSION: Small early/small vessel disease in the lung bases without  focal consolidation. No obvious acute inflammatory process in the  abdomen or pelvis by CT. Diffuse atherosclerosis. Mitral annular  calcifications and left atrial enlargement, please correlate for  mitral valve disorder. Cholecystectomy.    ZACK PATTERSON MD         SYSTEM ID:  [id number]   CT Chest w Contrast    Narrative    EXAMINATION: Chest CT  4/14/2024 3:17 PM    CLINICAL HISTORY: 2 seperate  fevers with interstial infilterates on  chest x ray on admisson, pt with h/o liver transplant    COMPARISON: None available.    TECHNIQUE: CT imaging obtained through the chest with contrast. Axial,  coronal, and sagittal reconstructions and axial MIP reformatted images  are reviewed.     CONTRAST: 91ml isovue 370    FINDINGS:  Lungs: The trachea and central airways are patent. No pneumothorax or  pleural effusion. Lower lobe predominant peribronchovascular  groundglass opacities. No suspicious pulmonary nodule. Calcified  granuloma in the left lower lobe.    Mediastinum: The visualized thyroid gland is unremarkable. The heart  size is within normal limits. No pericardial effusion. Extensive  calcifications of the LAD. Calcifications of aorta. The ascending  aorta caliber is normal. Main pulmonary caliber mildly enlarged at 3.4  cm. No central pulmonary embolus. Normal appearance and configuration  of the great vessels off of the aortic arch. No suspicious  mediastinal, hilar, or axillary lymph nodes. Nonspecific  circumferential esophageal wall thickening which can be seen in the  setting of esophagitis.     Bones and soft tissues: No suspicious bone findings. Degenerative  changes of the spine.    Upper Abdomen: Multifocal wedge shaped hypoenhancement in the  bilateral kidneys, greatest on the right. The remaining visualized  upper abdomen is unremarkable. Cholecystectomy. Mild splenic venous  varices.      Impression    IMPRESSION:   1. Lower lobe predominant peribronchovascular groundglass opacities,  likely representing infectious/inflammatory process.  2. Multifocal wedge-shaped hypoenhancement in the bilateral kidneys,  most pronounced on the right, concerning for pyelonephritis. Recommend  clinical correlation with urinalysis.  3. Minimal pulmonary artery enlargement which may indicate pulmonary  artery hypertension.    I have personally reviewed the examination and initial interpretation  and I agree with  the findings.    ZACK PATTERSON MD         SYSTEM ID:  [id number]   US Renal Complete Non-Vascular    Narrative    Complete renal nonvascular ultrasound    INDICATION: Concern for pyelonephritis    COMPARISON: CT earlier today    FINDINGS: Right kidney 11.2 cm in length. Left kidney 12.6 cm in  length. Normal cortical echotexture. Good cortical medullary  differentiation bilaterally. No cystic mass. No solid mass. No stone.  No hydronephrosis. Urinary bladder not visualized likely due to bowel  gas blocking or recent emptying if applicable.      Impression    IMPRESSION: Normal bilateral renal ultrasound    ZACK PATTERSON MD         SYSTEM ID:  [id number]

## 2024-04-15 NOTE — PLAN OF CARE
Goal Outcome Evaluation:  [id number]:  HX:68 year old male with a history of cirrhosis related to alcohol c/b HCC s/p DDLT (2018), CAD s/p PCI, afib, HTN, T2DM, hypothyroidism, history of prostate cancer s/p RALP (2020). He was admitted on 4/12/2024 with sepsis and afib with RVR.     Cardiac:A-fib 80-100s, up to 170s with activity with ambulation with PT. Provider aware. Continue to monitor. Advised patient to call for assistance to get out of bed.   VS:BPs 150s/90s. Provider aware, OK with BPs 150s/90s for now. T 100.0 at 1130, no meds given. C/O sweating at 1230. T 98.5. Sweating r/t fever breaking.   IV:PIV x3-SL. For IV antibiotics.   Tubes:NA  Neuro:Speaks [place]. Able to communicate basic needs. Daughter present for most of shift. Translated and assisted patient with ADLs and ordering food.    Resp:Denied shortness of breath, on RA. Lungs clear.   GI/:+BM, good UO (doesn't always save). Minimal PVR.   Nutrition:Regular diet with good PO intake.   Labs:NA  Endo:FSG in AM covered with sliding scale. Noon FSG not checked prior to eating.   Skin:Intact.  Activity:Up with assist to bathroom, ambulated independently in room. Walked in halls with therapy.   Pain:denied pain.   Social:Wife stayed overnight. Daughter here most of shift.   Plan:Awaiting culture results to determine appropriate antibiotics. Continue to monitor VS (temp, BP and HR/rhythm. OK to give up to 2 gm tylenol per day). Continue current cares and notify provider with questions and concerns.

## 2024-04-15 NOTE — PHARMACY-VANCOMYCIN DOSING SERVICE
Pharmacy Vancomycin Note  Date of Service April 15, 2024  Patient's  1955   68 year old, male, ABW 84.6 kg (standing wt from , notably less than bed scale wt of 89.5 kg from earlier on same day)    Indication: Sepsis, suspected pulmonary infection, note that MRSA nares PCR was negative on , ID consult and recommendations are pending at the time of this level evaluation  Day of Therapy: 4  Current vancomycin regimen:  1500 mg IV q24h  Current vancomycin monitoring method: AUC  Current vancomycin therapeutic monitoring goal: 400-600 mg*h/L    [employer] Prediction of Current Vancomycin Regimen      Current estimated CrCl = Estimated Creatinine Clearance: 78.7 mL/min (based on SCr of 0.94 mg/dL).    Creatinine for last 3 days  2024:  9:17 PM Creatinine 1.29 mg/dL  2024:  5:57 AM Creatinine 1.20 mg/dL  2024: 11:34 AM Creatinine 0.89 mg/dL  4/15/2024:  4:39 AM Creatinine 0.94 mg/dL    Recent Vancomycin Levels (past 3 days)  4/15/2024:  3:41 PM Vancomycin 10.5 ug/mL - 17 hrs post-dose    Vancomycin IV Administrations (past 72 hours)                     vancomycin (VANCOCIN) 1,500 mg in 0.9% NaCl 250 mL intermittent infusion (mg) 1,500 mg New Bag 24 2250     1,500 mg New Bag  0220    vancomycin (VANCOCIN) 1,750 mg in sodium chloride 0.9 % 500 mL intermittent infusion (mg) 1,750 mg New Bag 24 2307                    Nephrotoxins and other renal medications (From now, onward)      Start     Dose/Rate Route Frequency Ordered Stop    24 2200  vancomycin (VANCOCIN) 1,500 mg in 0.9% NaCl 250 mL intermittent infusion         1,500 mg  over 90 Minutes Intravenous EVERY 24 HOURS 24 2320                 Contrast Orders - past 72 hours (72h ago, onward)      Start     Dose/Rate Route Frequency Stop    24 1500  iopamidol (ISOVUE-370) solution 91 mL         91 mL Intravenous ONCE 24 1509            Interpretation of levels and current regimen:  Vancomycin level is  reflective of AUC less than 400    Has serum creatinine changed greater than 50% in last 72 hours: Yes, SCR on admission was 1.3, now recovered to baseline ~0.9    Urine output:  good urine output    Renal Function: Improving    [employer] Prediction of Planned New Vancomycin Regimen      Plan:  Will decrease vancomycin to 1500 mg IV q18h, pending ID recommendations (vancomycin may be discontinued before this new regimen can be implemented).  Vancomycin monitoring method: AUC  Vancomycin therapeutic monitoring goal: 400-600 mg*h/L  Pharmacy will check vancomycin levels as appropriate in 1-3 Days.  Serum creatinine levels will be ordered daily for the first week of therapy and at least twice weekly for subsequent weeks.    Sergey Ortega, PharmD, BCPS

## 2024-04-16 ENCOUNTER — VIRTUAL VISIT (OUTPATIENT)
Dept: INTERPRETER SERVICES | Facility: CLINIC | Age: 69
DRG: 871 | End: 2024-04-16
Payer: COMMERCIAL

## 2024-04-16 LAB
ALBUMIN SERPL BCG-MCNC: 3 G/DL (ref 3.5–5.2)
ALP SERPL-CCNC: 159 U/L (ref 40–150)
ALT SERPL W P-5'-P-CCNC: 17 U/L (ref 0–70)
ANION GAP SERPL CALCULATED.3IONS-SCNC: 10 MMOL/L (ref 7–15)
AST SERPL W P-5'-P-CCNC: 20 U/L (ref 0–45)
BASOPHILS # BLD AUTO: 0 10E3/UL (ref 0–0.2)
BASOPHILS NFR BLD AUTO: 0 %
BILIRUB SERPL-MCNC: 0.5 MG/DL
BUN SERPL-MCNC: 17.3 MG/DL (ref 8–23)
CALCIUM SERPL-MCNC: 8.2 MG/DL (ref 8.8–10.2)
CHLORIDE SERPL-SCNC: 99 MMOL/L (ref 98–107)
CREAT SERPL-MCNC: 0.96 MG/DL (ref 0.67–1.17)
CRP SERPL-MCNC: 55.4 MG/L
DEPRECATED HCO3 PLAS-SCNC: 21 MMOL/L (ref 22–29)
EGFRCR SERPLBLD CKD-EPI 2021: 85 ML/MIN/1.73M2
EOSINOPHIL # BLD AUTO: 0 10E3/UL (ref 0–0.7)
EOSINOPHIL NFR BLD AUTO: 0 %
ERYTHROCYTE [DISTWIDTH] IN BLOOD BY AUTOMATED COUNT: 13.7 % (ref 10–15)
GLUCOSE BLDC GLUCOMTR-MCNC: 250 MG/DL (ref 70–99)
GLUCOSE BLDC GLUCOMTR-MCNC: 290 MG/DL (ref 70–99)
GLUCOSE BLDC GLUCOMTR-MCNC: 297 MG/DL (ref 70–99)
GLUCOSE BLDC GLUCOMTR-MCNC: 323 MG/DL (ref 70–99)
GLUCOSE BLDC GLUCOMTR-MCNC: 338 MG/DL (ref 70–99)
GLUCOSE SERPL-MCNC: 285 MG/DL (ref 70–99)
HBA1C MFR BLD: 8.4 %
HCT VFR BLD AUTO: 37.3 % (ref 40–53)
HGB BLD-MCNC: 12.4 G/DL (ref 13.3–17.7)
IMM GRANULOCYTES # BLD: 0 10E3/UL
IMM GRANULOCYTES NFR BLD: 1 %
LYMPHOCYTES # BLD AUTO: 1.1 10E3/UL (ref 0.8–5.3)
LYMPHOCYTES NFR BLD AUTO: 23 %
MCH RBC QN AUTO: 28.1 PG (ref 26.5–33)
MCHC RBC AUTO-ENTMCNC: 33.2 G/DL (ref 31.5–36.5)
MCV RBC AUTO: 84 FL (ref 78–100)
MONOCYTES # BLD AUTO: 0.5 10E3/UL (ref 0–1.3)
MONOCYTES NFR BLD AUTO: 10 %
NEUTROPHILS # BLD AUTO: 3.1 10E3/UL (ref 1.6–8.3)
NEUTROPHILS NFR BLD AUTO: 66 %
NRBC # BLD AUTO: 0 10E3/UL
NRBC BLD AUTO-RTO: 0 /100
PLATELET # BLD AUTO: 166 10E3/UL (ref 150–450)
POTASSIUM SERPL-SCNC: 4 MMOL/L (ref 3.4–5.3)
PROT SERPL-MCNC: 5.6 G/DL (ref 6.4–8.3)
RBC # BLD AUTO: 4.42 10E6/UL (ref 4.4–5.9)
SODIUM SERPL-SCNC: 130 MMOL/L (ref 135–145)
WBC # BLD AUTO: 4.7 10E3/UL (ref 4–11)

## 2024-04-16 PROCEDURE — 82040 ASSAY OF SERUM ALBUMIN: CPT | Performed by: STUDENT IN AN ORGANIZED HEALTH CARE EDUCATION/TRAINING PROGRAM

## 2024-04-16 PROCEDURE — 99232 SBSQ HOSP IP/OBS MODERATE 35: CPT | Performed by: STUDENT IN AN ORGANIZED HEALTH CARE EDUCATION/TRAINING PROGRAM

## 2024-04-16 PROCEDURE — 85025 COMPLETE CBC W/AUTO DIFF WBC: CPT | Performed by: STUDENT IN AN ORGANIZED HEALTH CARE EDUCATION/TRAINING PROGRAM

## 2024-04-16 PROCEDURE — 250N000013 HC RX MED GY IP 250 OP 250 PS 637

## 2024-04-16 PROCEDURE — 250N000013 HC RX MED GY IP 250 OP 250 PS 637: Performed by: STUDENT IN AN ORGANIZED HEALTH CARE EDUCATION/TRAINING PROGRAM

## 2024-04-16 PROCEDURE — 258N000003 HC RX IP 258 OP 636

## 2024-04-16 PROCEDURE — 250N000011 HC RX IP 250 OP 636: Mod: JZ

## 2024-04-16 PROCEDURE — 86140 C-REACTIVE PROTEIN: CPT | Performed by: STUDENT IN AN ORGANIZED HEALTH CARE EDUCATION/TRAINING PROGRAM

## 2024-04-16 PROCEDURE — 120N000005 HC R&B MS OVERFLOW UMMC

## 2024-04-16 PROCEDURE — 36415 COLL VENOUS BLD VENIPUNCTURE: CPT | Performed by: STUDENT IN AN ORGANIZED HEALTH CARE EDUCATION/TRAINING PROGRAM

## 2024-04-16 PROCEDURE — 250N000013 HC RX MED GY IP 250 OP 250 PS 637: Performed by: INTERNAL MEDICINE

## 2024-04-16 PROCEDURE — 250N000012 HC RX MED GY IP 250 OP 636 PS 637

## 2024-04-16 PROCEDURE — 99232 SBSQ HOSP IP/OBS MODERATE 35: CPT | Performed by: INTERNAL MEDICINE

## 2024-04-16 PROCEDURE — T1013 SIGN LANG/ORAL INTERPRETER: HCPCS | Mod: U4,TEL

## 2024-04-16 PROCEDURE — 250N000011 HC RX IP 250 OP 636: Performed by: STUDENT IN AN ORGANIZED HEALTH CARE EDUCATION/TRAINING PROGRAM

## 2024-04-16 PROCEDURE — 250N000012 HC RX MED GY IP 250 OP 636 PS 637: Performed by: STUDENT IN AN ORGANIZED HEALTH CARE EDUCATION/TRAINING PROGRAM

## 2024-04-16 PROCEDURE — 83036 HEMOGLOBIN GLYCOSYLATED A1C: CPT | Performed by: STUDENT IN AN ORGANIZED HEALTH CARE EDUCATION/TRAINING PROGRAM

## 2024-04-16 RX ORDER — METFORMIN HCL 500 MG
1000 TABLET, EXTENDED RELEASE 24 HR ORAL 2 TIMES DAILY WITH MEALS
Status: DISCONTINUED | OUTPATIENT
Start: 2024-04-16 | End: 2024-04-17 | Stop reason: HOSPADM

## 2024-04-16 RX ORDER — URSODIOL 250 MG/1
250 TABLET, FILM COATED ORAL 2 TIMES DAILY
Status: DISCONTINUED | OUTPATIENT
Start: 2024-04-16 | End: 2024-04-17 | Stop reason: HOSPADM

## 2024-04-16 RX ORDER — CEFTRIAXONE 2 G/1
2 INJECTION, POWDER, FOR SOLUTION INTRAMUSCULAR; INTRAVENOUS EVERY 24 HOURS
Status: DISCONTINUED | OUTPATIENT
Start: 2024-04-16 | End: 2024-04-17 | Stop reason: HOSPADM

## 2024-04-16 RX ADMIN — INSULIN GLARGINE 10 UNITS: 100 INJECTION, SOLUTION SUBCUTANEOUS at 23:12

## 2024-04-16 RX ADMIN — INSULIN ASPART 3 UNITS: 100 INJECTION, SOLUTION INTRAVENOUS; SUBCUTANEOUS at 13:30

## 2024-04-16 RX ADMIN — ASPIRIN 81 MG CHEWABLE TABLET 81 MG: 81 TABLET CHEWABLE at 10:40

## 2024-04-16 RX ADMIN — MEROPENEM 2 G: 1 INJECTION, POWDER, FOR SOLUTION INTRAVENOUS at 06:21

## 2024-04-16 RX ADMIN — ATORVASTATIN CALCIUM 40 MG: 40 TABLET, FILM COATED ORAL at 19:24

## 2024-04-16 RX ADMIN — METFORMIN ER 500 MG 1000 MG: 500 TABLET ORAL at 19:23

## 2024-04-16 RX ADMIN — INSULIN ASPART 4 UNITS: 100 INJECTION, SOLUTION INTRAVENOUS; SUBCUTANEOUS at 19:06

## 2024-04-16 RX ADMIN — URSODIOL 250 MG: 250 TABLET ORAL at 13:30

## 2024-04-16 RX ADMIN — INSULIN ASPART 4 UNITS: 100 INJECTION, SOLUTION INTRAVENOUS; SUBCUTANEOUS at 10:13

## 2024-04-16 RX ADMIN — URSODIOL 250 MG: 250 TABLET ORAL at 19:24

## 2024-04-16 RX ADMIN — LEVOTHYROXINE SODIUM 150 MCG: 0.15 TABLET ORAL at 10:40

## 2024-04-16 RX ADMIN — MYCOPHENOLATE MOFETIL 500 MG: 500 TABLET, FILM COATED ORAL at 10:40

## 2024-04-16 RX ADMIN — APIXABAN 5 MG: 5 TABLET, FILM COATED ORAL at 19:24

## 2024-04-16 RX ADMIN — METFORMIN ER 500 MG 1000 MG: 500 TABLET ORAL at 13:30

## 2024-04-16 RX ADMIN — Medication 12.5 MG: at 10:40

## 2024-04-16 RX ADMIN — DILTIAZEM HYDROCHLORIDE 120 MG: 120 CAPSULE, COATED, EXTENDED RELEASE ORAL at 10:40

## 2024-04-16 RX ADMIN — APIXABAN 5 MG: 5 TABLET, FILM COATED ORAL at 10:47

## 2024-04-16 RX ADMIN — CEFTRIAXONE SODIUM 2 G: 2 INJECTION, POWDER, FOR SOLUTION INTRAMUSCULAR; INTRAVENOUS at 14:49

## 2024-04-16 RX ADMIN — PREDNISONE 5 MG: 5 TABLET ORAL at 10:40

## 2024-04-16 RX ADMIN — MYCOPHENOLATE MOFETIL 500 MG: 500 TABLET, FILM COATED ORAL at 19:23

## 2024-04-16 ASSESSMENT — ACTIVITIES OF DAILY LIVING (ADL)
ADLS_ACUITY_SCORE: 21
ADLS_ACUITY_SCORE: 28
ADLS_ACUITY_SCORE: 28
ADLS_ACUITY_SCORE: 26
ADLS_ACUITY_SCORE: 26
ADLS_ACUITY_SCORE: 28
ADLS_ACUITY_SCORE: 21
ADLS_ACUITY_SCORE: 20
ADLS_ACUITY_SCORE: 20
ADLS_ACUITY_SCORE: 21
ADLS_ACUITY_SCORE: 21
ADLS_ACUITY_SCORE: 28
ADLS_ACUITY_SCORE: 26
ADLS_ACUITY_SCORE: 28
ADLS_ACUITY_SCORE: 21

## 2024-04-16 NOTE — PLAN OF CARE
I/A: A/Ox4, Yemeni speaking. VSS on RA. Afebrile. Afib 50s-90s. BPs slightly elevated. Denies pain or SOB. BG ACHS, 300s. Cardiac diet, good appetite. Adequate UOP. LBM today. Ambulated marie with SBA. Call light within reach.     P: Infectious workup. IV abx. Encourage activity.     Hours of care: [id number]

## 2024-04-16 NOTE — PROGRESS NOTES
[facility]    Division of Infectious Diseases and International Medicine    TRANSPLANT INFECTIOUS DISEASE INPATIENT CONSULTATION NOTE   Patient:  Loy Limon, Date of birth 1955, Medical record number [id number]  Referred by: No ref. provider found   Reason for Consult: Fever       Assessment and Recommendations   Recommendations  Discontinue vancomycin and meropenem  Start CTX 2 g/day while in-house.   Upon discharge, can switch to levofloxacin with plans to complete a 7 day course of antibiotics for possible Gram-negative sepsis (Dates: 4/13-4/19)  If he were to re-develop  fevers, would send repeat blood cultures and a Karius test to ensure we are not missing additional pathogens.   He will benefit from PCV-20 and RSV vaccines  prior to discharge. Has already received the spring COVID-19 booster.     Transplant infectious diseases will continue to follow with you.     Alessandra Capone MD  [phone]    Assessment  Mr. 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 He was admitted on 4/13 with unexplained fevers, for which we are being consulted.     Fevers, sepsis  History of ESBL E coli pyelonephritis, resolved  At this point, no clear etiology of ongoing fevers. CT C/A/P relatively unremarkable. In fact, the CT of his chest from admission looks actually looks improved from the CT chest on 3/22/24. UA relatively unremarkable and urine culture with mixed urogenital luc. Blood cultures NGTD. Regarding exposure history, he is [place]end[place], but negative antibodies in 2018. He did travel to [facility] in Feb, but only stayed with family in the city and was without animal exposure.  On the differential would be Listeria (does eat soft cheeses and raw vegetables), which should be detected on routine blood  "cultures. 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 No known tickbite exposure (and is a bit early in the season). Doesn't have animals. No undercooked meat. No one in the family sick. At this point, plan for total of 7 day course of antibiotics for possible Gram negative sepsis with negative cultures.     Other Infectious Disease issues include:  - QTc interval: 502 (24)  - Bacterial prophylaxis: None  - Pneumocystis prophylaxis: Not applicable  - Serostatus & viral prophylaxis: CMV D-/R+; EBV D+/R+   - Fungal prophylaxis: Not applicable  - Isolation status: Contact (ESBL history)  - Code status: Full Code     Recent Labs   Lab Test 24  2117   IGG 1,143      Prior infectious diseases issues   + Quant 2018. Was initially started on gafhipns79bl/kg PO x4 months on 2018 (end date ~2024) but only completed 2.5 months worth. On ID clinic visit with Dr Clancy on 2019, was transitioned over to INH 300mg daily (due to rifampin DDI with transplant immunosuppressants) which he took for an additional ~2 months with end date on ~2019.   ESBL E coli Pyelonephritis: Admitted from -3/9 with ESBL E coli pyelonephritis. Treated with 14 days of ertapenem (3/1/2024-3/14/2024).    CAP: Occurred during -3/9 admission. BAL negative for fungal etiology. Given azithro x3 days and ertapenem (see above). AFB x3 negative.        Interval events    Doing very  well today. States that he could \"go for a run\" if allowed to.  No fevers/chills today.      Did more thorough  exposure history  today. Traveled to [facility] in February for . Didn't interact with any animals. No other recent travel. No one in immediate family sick. Does eat soft cheeses from the local [facility] grocery store. Eats meat, but this is well-cooked. Day of admission ate cicharrons and tristan de lange about 30 minutes before he got rigors and vomited.  Wife ate same thing and " doing fine. All food is home cooked. Has not eaten at restaurant since his previous hospital admission. No one else in family has fevers. 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 Always wears gloves. Has not been outside except for his mall job.      Prior Antibiotics  Vancomycin: 4/14-current  Meropenem: 4/13-current  Azithro: 4/14-4/15  TMP/SMX: 3/22- 3/29  Ertapenem: 3/1-3/24    Relevant Labs/Imaging   Microbiology Lab  4/4: Blood cultures NGTD   4/12: Blood cultures NGTD  4/12: Urine culture with urogenital luc (UA with small nitrites/LE)  4/12: Legionella/S pneumo/RVP negative     Imaging   4/14: Renal US normal     4/14: CT C/A/P  1. Lower lobe predominant peribronchovascular groundglass opacities,  likely representing infectious/inflammatory process.  2. Multifocal wedge-shaped hypoenhancement in the bilateral kidneys,  most pronounced on the right, concerning for pyelonephritis. Recommend  clinical correlation with urinalysis.  3. Minimal pulmonary artery enlargement which may indicate pulmonary  artery hypertension.  4. Small early/small vessel disease in the lung bases without  focal consolidation. No obvious acute inflammatory process in the abdomen or pelvis by CT. Diffuse atherosclerosis. Mitral annular calcifications and left atrial enlargement, please correlate for mitral valve disorder. Cholecystectomy.       Other Medical History:     Attempt was made to collect past, family and social history during this encounter,  this information was reviewed with the patient and updated    Allergies Patient has no known allergies.    Past Medical History  No past medical history on file. PastSurgical History   has no past surgical history on file.   Family History  No family history on file. Social History  He    Notable Exposures Listed below if pertinent    Vaccination History:  Immunization History   Administered Date(s) Administered    COVID-19 12+ (2023-24) (Pfizer) 03/20/2024     "COVID-19 Bivalent 12+ (Pfizer) 10/26/2022, 05/09/2023    COVID-19 Monovalent 18+ (Moderna) 03/01/2021, 03/30/2021, 09/21/2021, 04/26/2022             Physical Exam:     VITAL SIGNS:  Blood pressure 127/60, pulse 76, temperature 98.6  F (37  C), temperature source Oral, resp. rate 18, height 1.75 m (5' 8.9\"), weight 84.6 kg (186 lb 6.4 oz), SpO2 95%.     GENERAL APPEARANCE: Not in acute distress. Sitting up in bed and talking today.     PHYSICAL EXAM:   Eyes:     no ptosis, no discharge, no scleral icterus  Mouth, Throat:     mucous membranes moist  Cardiovascular:    Inspection: No Cyanosis, JVD not elevated   Auscultation:  S1, S2 normal, regular rate and rhythm  Respiratory:     Inspection: Not in respiratory distress, Chest expansion symmetrical   Auscultation: 4 point auscultation done clear to auscultation bilaterally, no wheezes, no rales, and no rhonchi  Gastrointestinal:      soft, non-tender; bowel sounds normal; no masses  Musculoskeletal:     no elbow wrist knee or ankle tenderness, deformity or swelling, no quadriceps calf or upper arm muscular tenderness noted  Skin:     Dry and intact   RUE at site of old PICC without swelling/erythema   Neurologic:     Higher Mental Function: Conversant, AOx4   Facial asymmetry grossly absent   is ambulatory  Psychiatric:     Appropriate           Laboratory Data:   Metabolic Studies       Recent Labs   Lab Test 04/16/24  0527 04/15/24  0959 04/15/24  0439 04/14/24  1253 04/14/24  1134 04/14/24  0840 04/12/24  2121 04/12/24  2117   *  --  135  --  133*  --    < > 136   POTASSIUM 4.0  --  4.2  --  4.2  --    < > 3.9   CHLORIDE 99  --  101  --  103  --    < > 106   CO2 21*  --  22  --  19*  --    < > 17*   ANIONGAP 10  --  12  --  11  --    < > 13   BUN 17.3  --  13.5  --  15.9  --    < > 22.4   CR 0.96  --  0.94  --  0.89  --    < > 1.29*   GFRESTIMATED 85  --  87  --  >90  --    < > 60*   *   < > 149*   < > 304*  --    < > 207*   YELENA 8.2*  --  8.7*  --  " "8.6*  --    < > 8.7*   MAG  --   --   --   --   --   --   --  1.7   LACT  --   --  1.6  --   --  0.9   < >  --    PCAL  --   --   --   --  30.80*  --   --  0.55*   FGTL  --   --   --   --  <31  --   --   --     < > = values in this interval not displayed.       Hepatic Studies    Recent Labs   Lab Test 04/16/24  0527 04/15/24  0439 04/14/24  1134   BILITOTAL 0.5 0.8 1.0   ALKPHOS 159* 176* 180*   PROTTOTAL 5.6* 6.4 6.0*   ALBUMIN 3.0* 3.3* 3.2*   AST 20 20 19   ALT 17 13 19       Hematology Studies      Recent Labs   Lab Test 04/16/24  0527 04/15/24  0439 04/14/24  1134 04/13/24  0557 04/12/24  2117   WBC 4.7 6.0 8.6 9.1 5.5   HGB 12.4* 13.8 13.3 11.8* 14.2   HCT 37.3* 42.8 40.9 37.4* 43.2    167 164 183 180       Arterial Blood Gas Testing    Recent Labs   Lab Test 04/12/24  2131   O2PER 21        Medication levels    Recent Labs   Lab Test 04/15/24  1541   VANCOMYCIN 10.5       No results found for: \"ACD4\"    Inflammatory Markers  No lab results found.    Invalid input(s): \"AUTO\", \"WESR\"    Immune Globulin Studies     Recent Labs   Lab Test 04/12/24 2117   IGG 1,143       Pancreatitis testing    Recent Labs   Lab Test 04/13/24  0029   LIPASE 19       Gout Labs    No lab results found.    Clotting Studies    Recent Labs   Lab Test 04/13/24 0557 04/12/24 2117   INR 1.21* 1.07   PTT  --  26         Markers  No lab results found.    Invalid input(s): \"FETOPROTEIN\", \"SERUM\", \"AFP\"    Autoimmune Testing  No lab results found.    Invalid input(s): \"PRO3\", \"C3\", \"C4\", \"VASPAN\"    Thyroid Studies     Recent Labs   Lab Test 04/13/24  0029   TSH 0.32       Urine Studies     Recent Labs   Lab Test 04/12/24  2134   URINEPH 6.0   NITRITE Negative   LEUKEST Small*   WBCU 79*       CSF testing   No lab results found.    Invalid input(s): \"CADAM\", \"EVPCR\", \"ENTPCR\", \"ENTEROVIRUS\"    Body fluid stats  No lab results found.    Microbiology:  Fungal testing  Recent Labs   Lab Test 04/14/24  1134   FGTL <31       Culture " "  Date Value Ref Range Status   04/14/2024 No growth after 1 day  Preliminary   04/14/2024 No growth after 1 day  Preliminary   04/12/2024 >100,000 CFU/mL Mixture of Urogenital Liane  Final   04/12/2024 No growth after 3 days  Preliminary   04/12/2024 No growth after 3 days  Preliminary   (    Last check of C difficile  No results found for: \"CDBPCT\"    Infection Studies to assess Diarrhea No lab results found.    Virology:  Coronavirus-19 testing    Recent Labs   Lab Test 04/12/24  2330   [id number] Negative       Respiratory virus testing    Recent Labs   Lab Test 04/12/24  2330   IFLUA Not Detected   INFZA Negative   FLUAH1 Not Detected   ND8366 Not Detected   FLUAH3 Not Detected   IFLUB Not Detected   INFZB Negative   PIV1 Not Detected   PIV2 Not Detected   PIV3 Not Detected   PIV4 Not Detected   IRSV Negative   RSVA Not Detected   RSVB Not Detected   HMPV Not Detected   ADENOV Not Detected   OROPEZA Not Detected       No results found for: \"CMVIGG\", \"CMVM\", \"CMVIM\", \"CMIG\", \"CMVG\", \"CMIGG\", \"CMIM\", \"CMVIGM\", \"CMLTX\", \"HSVG1\", \"HSVG2\", \"HSVTP1\", \"LK9547\", \"HS12M\", \"HS12GR\", \"HS1GR\", \"HS2GR\", \"HSIM\", \"HSIG\", \"HSIGR\", \"HSVIGMAB\", \"VZVIGG\", \"VARICZOSAB\"  No results found for: \"EBVCAG\", \"EBIG2\", \"EBIGM\", \"EBVIGG\", \"EBIGG\", \"EBVAGN\", \"DI6013\", \"TOXG\"  No results found for: \"H1IGG\", \"H2IGG\", \"EBVCAM\"    CMV viral loads    Recent Labs   Lab Test 04/14/24  1256   CMVQNT Not Detected       CMV resistance testing  No lab results found.    No results found for: \"H6RES\"    No results found for: \"EBRES\"    No results found for: \"29826\", \"BKRES\"    Parvovirus Testing  No lab results found.    Invalid input(s): \"PRVRES\"    Adenovirus Testing  No lab results found.    Invalid input(s): \"ADENAB\", \"ADENOVIRUS\", \"ADQT\"    Hepatitis B Testing   No lab results found.   No results found for: \"HCVAB\", \"HQTG\", \"HCGENO\", \"HCPCR\", \"HQTRNA\", \"HEPRNA\", \"CRYOG\"    Imaging:  Recent Results (from the past 48 hour(s))   US Lower Extremity " Venous Duplex Bilateral    Narrative    EXAMINATION: DOPPLER VENOUS ULTRASOUND OF BILATERAL LOWER EXTREMITIES,  4/14/2024 12:20 PM     COMPARISON: None.    HISTORY: to assess for dvt    TECHNIQUE:  Gray-scale evaluation with compression, spectral flow and  color Doppler assessment of the deep venous system of both legs from  groin to knee, and then at the ankles.    FINDINGS:  In both lower extremities, the common femoral, femoral, popliteal,  peroneal, and posterior tibial veins demonstrate normal  compressibility and blood flow.      Impression    IMPRESSION:  No evidence of deep venous thrombosis in either lower extremity.    I have personally reviewed the examination and initial interpretation  and I agree with the findings.    CHAPARRITA MANRIQUEZ DO         SYSTEM ID:  [id number]   CT Head w/o Contrast    Narrative    EXAM: CT HEAD W/O CONTRAST  4/14/2024 12:40 PM     HISTORY:  unclear fever source, Some headache no focal neuro symptoms,  please assess for fever source?       COMPARISON:  None    TECHNIQUE: Using multidetector thin collimation helical acquisition  technique, axial, coronal and sagittal CT images from the skull base  to the vertex were obtained without intravenous contrast.   (topogram) image(s) also obtained and reviewed.    FINDINGS:  No acute intracranial hemorrhage, mass effect, or midline shift. No  acute loss of gray-white matter differentiation in the cerebral  hemispheres. Ventricles are proportionate to the cerebral sulci. Clear  basal cisterns. Encephalomalacia of the left parasagittal occipital  lobe. Patchy hypoattenuation of the periventricular and subcortical  white matter which is nonspecific but favored to represent chronic  small vessel ischemic disease given patient's age. Moderate  generalized parenchymal atrophy.    The bony calvaria and the bones of the skull base are normal. The  visualized portions of the paranasal sinuses and mastoid air cells are  clear. Grossly normal  orbits.       Impression    IMPRESSION:   1. No acute intracranial pathology.   2. Findings suggestive of chronic small vessel ischemic disease and  prior left occipital infarct.    I have personally reviewed the examination and initial interpretation  and I agree with the findings.    SURENDRA RAIN MD         SYSTEM ID:  [id number]   CT Abdomen Pelvis w/o Contrast    Narrative    CT abdomen pelvis without contrast    INDICATION: Fevers    COMPARISON: None    FINDINGS: No contrast. Coronary artery and thoracic aortic  atherosclerotic calcifications in the lower chest. Mitral annular  calcifications. Please correlate for mitral valve disorder the left  atrium is mildly prominent. Calcified granuloma in the posterior left  lung base. Patchy mosaic attenuation in the lower lobes may be related  to small airway or small vessel disease without focal consolidation.  Within the abdomen and pelvis, noncontrast appearances of the liver  and spleen are normal. Cholecystectomy. Atherosclerotic calcification  in the abdominal aorta as well as the hepatic artery. Left renal an  right renal artery origin atherosclerotic calcifications also present.  Mild diverticular disease the large intestine. Sigmoid colon is mild  to moderately redundant. No enlarged inguinal or deep pelvic no other  mesenteric or retroperitoneal lymph nodes.  Pancreas is mildly atrophic. Adrenals and kidneys appear unremarkable.  Scattered calcifications in the distal right renal artery. No fluid  collection organized inflammatory change in the abdomen or pelvis.  Urinary bladder unremarkable. Iliofemoral atherosclerotic  calcifications bilaterally.    Bone detail shows minimal dextrocurvature of the lumbar spine.  Degenerative changes in the thoracolumbar spine. Mild osteopenia.  No free fluid. No free air.      Impression    IMPRESSION: Small early/small vessel disease in the lung bases without  focal consolidation. No obvious acute inflammatory process in  the  abdomen or pelvis by CT. Diffuse atherosclerosis. Mitral annular  calcifications and left atrial enlargement, please correlate for  mitral valve disorder. Cholecystectomy.    ZACK PATTERSON MD         SYSTEM ID:  [id number]   CT Chest w Contrast    Narrative    EXAMINATION: Chest CT  4/14/2024 3:17 PM    CLINICAL HISTORY: 2 seperate fevers with interstial infilterates on  chest x ray on admisson, pt with h/o liver transplant    COMPARISON: None available.    TECHNIQUE: CT imaging obtained through the chest with contrast. Axial,  coronal, and sagittal reconstructions and axial MIP reformatted images  are reviewed.     CONTRAST: 91ml isovue 370    FINDINGS:  Lungs: The trachea and central airways are patent. No pneumothorax or  pleural effusion. Lower lobe predominant peribronchovascular  groundglass opacities. No suspicious pulmonary nodule. Calcified  granuloma in the left lower lobe.    Mediastinum: The visualized thyroid gland is unremarkable. The heart  size is within normal limits. No pericardial effusion. Extensive  calcifications of the LAD. Calcifications of aorta. The ascending  aorta caliber is normal. Main pulmonary caliber mildly enlarged at 3.4  cm. No central pulmonary embolus. Normal appearance and configuration  of the great vessels off of the aortic arch. No suspicious  mediastinal, hilar, or axillary lymph nodes. Nonspecific  circumferential esophageal wall thickening which can be seen in the  setting of esophagitis.     Bones and soft tissues: No suspicious bone findings. Degenerative  changes of the spine.    Upper Abdomen: Multifocal wedge shaped hypoenhancement in the  bilateral kidneys, greatest on the right. The remaining visualized  upper abdomen is unremarkable. Cholecystectomy. Mild splenic venous  varices.      Impression    IMPRESSION:   1. Lower lobe predominant peribronchovascular groundglass opacities,  likely representing infectious/inflammatory process.  2. Multifocal  wedge-shaped hypoenhancement in the bilateral kidneys,  most pronounced on the right, concerning for pyelonephritis. Recommend  clinical correlation with urinalysis.  3. Minimal pulmonary artery enlargement which may indicate pulmonary  artery hypertension.    I have personally reviewed the examination and initial interpretation  and I agree with the findings.    ZACK PATTERSON MD         SYSTEM ID:  [id number]   US Renal Complete Non-Vascular    Narrative    Complete renal nonvascular ultrasound    INDICATION: Concern for pyelonephritis    COMPARISON: CT earlier today    FINDINGS: Right kidney 11.2 cm in length. Left kidney 12.6 cm in  length. Normal cortical echotexture. Good cortical medullary  differentiation bilaterally. No cystic mass. No solid mass. No stone.  No hydronephrosis. Urinary bladder not visualized likely due to bowel  gas blocking or recent emptying if applicable.      Impression    IMPRESSION: Normal bilateral renal ultrasound    ZACK PATTERSON MD         SYSTEM ID:  [id number]

## 2024-04-16 NOTE — CONSULTS
Discharge Pharmacy Test Claim    Patient's [facility] Medicare plan covers Lantus, Novolog, Onetouch Ultra2 meter and strips, and Onetouch Delica lancets. Lantus and the Onetouch Delica lancets were too soon to fill. Earliest fill dates, as well as expected monthly copays for the other products are listed below.    Test Claim Copay Notes   Lantus 4.60 Filled 3/11/24 at [facility]. Next fill 5/03/24   Novolog 4.60    OneTouch Ultra strips 0.00    OneTouch Ultra2 Meter 0.00    OneTouch Delica 0.00 Filled 3/12/24 at [facility], next fill 5/01/24       Karli Seymour  [facility] Liaison  Phone [phone]   Fax [phone]     Additional Safety/Bands:

## 2024-04-16 NOTE — PROGRESS NOTES
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 He was admitted on 4/12/2024 with sepsis and FUO and afib with RVR.        Today's plan   - Glu elevated. Will restart home glargine but at lower dose per my explanation below. Will restart metformin. Diabetes educator.   - No more fevers overnight. De escalate Abx to CTX for now. If fevers again can order Karius. If hemodynamically unstable can transition to Carbapenems again. Appreciate ID recs   - Can restart anti HTN meds tomorrow if BP is elevated      # Severe sepsis  # Fevers of unknown origin  # History of ESBL in urine  # Immunosuppressed status  - Admitted 4/12/2024 with acute onset of rigors and vomiting and found to be septic  At this point, no clear etiology of ongoing fevers. CT C/A/P relatively unremarkable. In fact, the CT of his chest from admission looks actually looks improved from the CT chest on 3/22/25. UA relatively unremarkable and urine culture with mixed urogenital luc. Blood cultures NGTD. Legionella neg, strep PNA neg, CMV neg, EBV neg, HSV neg. MRSA nares neg, Res Panel neg, urine Cx Urogenital luc. Tx ID consulted    Plan:  - Follow up 1,3 BDG  - Follow up Blood Cx  - Stop Meropenem/Vanc. Start CTX SOT 04/16. EOT of total 7 days of Abx 04/20 unless indicated otherwise per ID. Can discharge on Fluoroquonolone to continue course. Watch for dysglycemia with Fluoroquinolones.   - Karius if continues to have fevers  - If hemodynamically unstable can transition to Carbapenems again.  - Tx ID following    - PT/OT      # Type II NSTEMI 2/2 sepsis and uncontrolled Afib  # Paroxysmal atrial fibrillation, [id number] 3 (age+DM2+CAD)  Admitted  with afib RVR with rates to 160s, improved rates with diltiazem gtt in the ED. Likely secondary to sepsis. Cards consulted.    Plan:  - Started diltiazam 120 mg instead of PTA BB  - Started apixaban 5 mg bid   - Zio patch upon Discharge  - Follow up in general cardiology clinic within 1 month of discharge to discuss symptoms and further risk stratification of the patients chronic coronary heart disease and determine if additional rate or rhythm control is necessary for his Afib        # DEREK now resolved   Creatinine 1.29 on admission from baseline of ~0.8. Likely in setting of sepsis and afib RVR.   - CTM        ____________________________  Chronic/stable/improved:     # Hypothyroidism  3/2/2024 TSH mildly elevated at 5.47, free T4 wnl at 1.24. 4/13/2024 TSH 0.32.   - Continue levothyroxine 150mcg daily     # Cirrhosis related to alcohol c/b HCC s/p s/p DDLT (2018)  # Hx of biliary stricture requiring stent, last ERCP 2019  - Continue pta mycophenolate 500mg BID  - Continue pta prednisone 5mg daily   - Continue pta ursodiol 250mg BID     # HTN  - holding pta amlodipine 10mg daily in the setting of sepsis. Restart as appropriate  - holding pta hydralazine 25mg BID in the setting of sepsis. Restart as appropriate       # CAD s/p PCI to mLAD   - continue pta atorvastatin 40mg daily  - resume pta aspirin 81mg daily (patient was not taking at home, but should be)       # Type 2 diabetes  2/6/2024 A1c 9.2%.   - On glargine insulin (written as 28 units, but patient reports taking 15 units only occasionally depending on his blood sugars) and Metformin at home    Plan:  - Continue PTA metformin  - Will start Glargine 10 units at bedtime and increase as needed.Will get Diabetes IP educator to talk about proper use of the insulin.   - A1c ordered   - MDSSI only if sugar not controlled as above.   - hypoglycemia protocol           Diet: Low Saturated Fat Na <2400 mg    DVT Prophylaxis: DOAC  Cronin Catheter: Not present  Lines:  "None     Cardiac Monitoring: None  Code Status: Full Code      Clinically Significant Risk Factors              # Hypoalbuminemia: Lowest albumin = 3.1 g/dL at 4/13/2024  5:57 AM, will monitor as appropriate  # Coagulation Defect: INR = 1.21 (Ref range: 0.85 - 1.15) and/or PTT = 26 Seconds (Ref range: 22 - 38 Seconds), will monitor for bleeding           # Overweight: Estimated body mass index is 27.61 kg/m  as calculated from the following:    Height as of this encounter: 1.75 m (5' 8.9\").    Weight as of this encounter: 84.6 kg (186 lb 6.4 oz)., PRESENT ON ADMISSION            Disposition Plan     Medically Ready for Discharge: Anticipated in 2-4 Days             JASKARAN VERDUGO MD  Hospitalist Service, [facility] TEAM [address] [place] [address]  Securely message with [employer] (more info)  Text page via [employer] Paging/Directory   See signed in provider for up to date coverage information  ______________________________________________________________________    Interval History   Daughter at bedside declined an  when offered. Answered all questions about hospital course. Discussed treatment plan above     Physical Exam   Vital Signs: Temp: 98.2  F (36.8  C) Temp src: Oral BP: (!) 142/76 Pulse: 81   Resp: 18 SpO2: 94 % O2 Device: None (Room air)    Weight: 186 lbs 6.4 oz    Constitutional: Sitting on chair with no acute distress     Medical Decision Making       35 MINUTES SPENT BY ME on the date of service doing chart review, history, exam, documentation & further activities per the note.      Data   ------------------------- PAST 24 HR DATA REVIEWED -----------------------------------------------  "

## 2024-04-16 NOTE — PLAN OF CARE
"2300-0730  Vitals:   Blood pressure (!) 147/82, pulse 61, temperature 98.8  F (37.1  C), temperature source Oral, resp. rate 18, height 1.75 m (5' 8.9\"), weight 84.6 kg (186 lb 6.4 oz), SpO2 95%.  Neuro: A/Ox4, Thai interpretor needed  Cardiac: Afib 60's, hypertensive  Respiratory: sating >92 on RA   Diet/appetite: low sat fat diet, tolerating well  Endocrine: ACHS bg checks  GI/:  No bm this shift, voids ad shawn  Activity:  Up ind in room  Pain: Denies  Skin: WNL, scattered bruising   Lines: R PIV sl    Plan: Infectious workup. IV abx, continue to monitor pt condition, notify team of changes per POC    Problem: Adult Inpatient Plan of Care  Goal: Absence of Hospital-Acquired Illness or Injury  Outcome: Progressing  Intervention: Identify and Manage Fall Risk  Recent Flowsheet Documentation  Taken 4/16/2024 0300 by Diamond Singh RN  Safety Promotion/Fall Prevention:   activity supervised   assistive device/personal items within reach   clutter free environment maintained   room near nurse's station   safety round/check completed  Intervention: Prevent Skin Injury  Recent Flowsheet Documentation  Taken 4/16/2024 0300 by Diamond Singh RN  Body Position: position changed independently  Skin Protection: adhesive use limited  Device Skin Pressure Protection:   adhesive use limited   tubing/devices free from skin contact  Intervention: Prevent and Manage VTE (Venous Thromboembolism) Risk  Recent Flowsheet Documentation  Taken 4/16/2024 0300 by Diamond Singh RN  VTE Prevention/Management: SCDs (sequential compression devices) off  Intervention: Prevent Infection  Recent Flowsheet Documentation  Taken 4/16/2024 0300 by Diamond Singh RN  Infection Prevention:   cohorting utilized   environmental surveillance performed   equipment surfaces disinfected   hand hygiene promoted   personal protective equipment utilized   rest/sleep promoted   single patient room provided     Problem: Diabetes  Goal: Optimal Coping  Outcome: " Progressing  Intervention: Support Wellbeing and Self-Management Success  Recent Flowsheet Documentation  Taken 4/16/2024 0300 by Diamond Singh RN  Supportive Measures:   active listening utilized   decision-making supported     Problem: Diabetes  Goal: Minimize Risk of Hypoglycemia  Outcome: Progressing  Intervention: Management and Risk Reduction of Hypoglycemia  Recent Flowsheet Documentation  Taken 4/16/2024 0300 by Diamond Singh RN  Hypoglycemia Management: blood glucose monitored     Problem: Fall Injury Risk  Goal: Absence of Fall and Fall-Related Injury  Outcome: Progressing  Intervention: Identify and Manage Contributors  Recent Flowsheet Documentation  Taken 4/16/2024 0300 by Diamond Singh RN  Self-Care Promotion: independence encouraged  Medication Review/Management: medications reviewed  Intervention: Promote Injury-Free Environment  Recent Flowsheet Documentation  Taken 4/16/2024 0300 by Diamond Singh RN  Safety Promotion/Fall Prevention:   activity supervised   assistive device/personal items within reach   clutter free environment maintained   room near nurse's station   safety round/check completed     Problem: Skin Injury Risk Increased  Goal: Skin Health and Integrity  Outcome: Progressing  Intervention: Plan: Nurse Driven Intervention: Moisture Management  Recent Flowsheet Documentation  Taken 4/16/2024 0300 by Diamond Singh RN  Moisture Interventions: Encourage regular toileting  Intervention: Optimize Skin Protection  Recent Flowsheet Documentation  Taken 4/16/2024 0300 by Diamond Singh RN  Pressure Reduction Techniques: frequent weight shift encouraged  Skin Protection: adhesive use limited  Activity Management: activity adjusted per tolerance  Head of Bed (HOB) Positioning: HOB at 20-30 degrees

## 2024-04-16 NOTE — PLAN OF CARE
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 He was admitted on 4/12/2024 with sepsis and afib with RVR.      Cardiac:A-fib 80-100s, ambulated in halls with no significant increase in HR. Continue to monitor. Encouraged patient to sit down and notify staff if he feels lightheaded or dizzy when ambulating in halls. Must ambulate with staff or family.   VS:VSS. No fevers this shift.    IV:PIV-SL. For IV antibiotics.   Tubes:NA  Neuro:Speaks Haitian. Able to communicate basic needs. Daughter present for some of shift. Translated and assisted patient with ADLs and ordering food.    Resp:Denied shortness of breath, on RA. Lungs clear.   GI/:+BM, good UO per patient (doesn't save).   Nutrition:Regular diet with good PO intake. Eats food that family brings. Does not like the hospital food.   Labs:NA  Endo:FSG in AM covered with sliding scale. Noon FSG not checked prior to eating.   Skin:Intact.  Activity:Ambulated independently in room. Walked in halls with family. Not seen by therapy this shift.    Pain:Denied pain.   Social:Wife stayed overnight. Daughter here much of shift.   Plan:Per providers, no clear source of fevers. IV antibiotic changed to Rocephin.  Continue to monitor Temp and HR. Continue cares and notify provider with questions and concerns. Discharge to home tomorrow or Thursday.

## 2024-04-17 ENCOUNTER — ORDERS ONLY (AUTO-RELEASED) (OUTPATIENT)
Dept: CARDIOLOGY | Facility: CLINIC | Age: 69
End: 2024-04-17
Payer: COMMERCIAL

## 2024-04-17 ENCOUNTER — APPOINTMENT (OUTPATIENT)
Dept: INTERPRETER SERVICES | Facility: CLINIC | Age: 69
DRG: 871 | End: 2024-04-17
Payer: COMMERCIAL

## 2024-04-17 VITALS
WEIGHT: 186.4 LBS | RESPIRATION RATE: 24 BRPM | BODY MASS INDEX: 27.61 KG/M2 | DIASTOLIC BLOOD PRESSURE: 70 MMHG | HEIGHT: 69 IN | TEMPERATURE: 98.7 F | OXYGEN SATURATION: 97 % | HEART RATE: 84 BPM | SYSTOLIC BLOOD PRESSURE: 120 MMHG

## 2024-04-17 DIAGNOSIS — I48.91 ATRIAL FIBRILLATION WITH RAPID VENTRICULAR RESPONSE (H): ICD-10-CM

## 2024-04-17 LAB
ALBUMIN SERPL BCG-MCNC: 3 G/DL (ref 3.5–5.2)
ALP SERPL-CCNC: 163 U/L (ref 40–150)
ALT SERPL W P-5'-P-CCNC: 18 U/L (ref 0–70)
ANION GAP SERPL CALCULATED.3IONS-SCNC: 10 MMOL/L (ref 7–15)
AST SERPL W P-5'-P-CCNC: 20 U/L (ref 0–45)
BACTERIA BLD CULT: NO GROWTH
BACTERIA BLD CULT: NO GROWTH
BASOPHILS # BLD AUTO: 0 10E3/UL (ref 0–0.2)
BASOPHILS NFR BLD AUTO: 0 %
BILIRUB SERPL-MCNC: 0.3 MG/DL
BUN SERPL-MCNC: 16.4 MG/DL (ref 8–23)
CALCIUM SERPL-MCNC: 8.4 MG/DL (ref 8.8–10.2)
CHLORIDE SERPL-SCNC: 104 MMOL/L (ref 98–107)
CREAT SERPL-MCNC: 0.88 MG/DL (ref 0.67–1.17)
DEPRECATED HCO3 PLAS-SCNC: 22 MMOL/L (ref 22–29)
EGFRCR SERPLBLD CKD-EPI 2021: >90 ML/MIN/1.73M2
EOSINOPHIL # BLD AUTO: 0.1 10E3/UL (ref 0–0.7)
EOSINOPHIL NFR BLD AUTO: 2 %
ERYTHROCYTE [DISTWIDTH] IN BLOOD BY AUTOMATED COUNT: 13.7 % (ref 10–15)
GLUCOSE BLDC GLUCOMTR-MCNC: 203 MG/DL (ref 70–99)
GLUCOSE BLDC GLUCOMTR-MCNC: 369 MG/DL (ref 70–99)
GLUCOSE SERPL-MCNC: 193 MG/DL (ref 70–99)
HCT VFR BLD AUTO: 37.2 % (ref 40–53)
HGB BLD-MCNC: 12.4 G/DL (ref 13.3–17.7)
IMM GRANULOCYTES # BLD: 0 10E3/UL
IMM GRANULOCYTES NFR BLD: 0 %
LYMPHOCYTES # BLD AUTO: 1.5 10E3/UL (ref 0.8–5.3)
LYMPHOCYTES NFR BLD AUTO: 29 %
MCH RBC QN AUTO: 27.8 PG (ref 26.5–33)
MCHC RBC AUTO-ENTMCNC: 33.3 G/DL (ref 31.5–36.5)
MCV RBC AUTO: 83 FL (ref 78–100)
MONOCYTES # BLD AUTO: 0.6 10E3/UL (ref 0–1.3)
MONOCYTES NFR BLD AUTO: 12 %
NEUTROPHILS # BLD AUTO: 3 10E3/UL (ref 1.6–8.3)
NEUTROPHILS NFR BLD AUTO: 57 %
NRBC # BLD AUTO: 0 10E3/UL
NRBC BLD AUTO-RTO: 0 /100
PLATELET # BLD AUTO: 188 10E3/UL (ref 150–450)
POTASSIUM SERPL-SCNC: 4 MMOL/L (ref 3.4–5.3)
PROT SERPL-MCNC: 5.8 G/DL (ref 6.4–8.3)
RBC # BLD AUTO: 4.46 10E6/UL (ref 4.4–5.9)
SODIUM SERPL-SCNC: 136 MMOL/L (ref 135–145)
WBC # BLD AUTO: 5.2 10E3/UL (ref 4–11)

## 2024-04-17 PROCEDURE — 99239 HOSP IP/OBS DSCHRG MGMT >30: CPT | Performed by: STUDENT IN AN ORGANIZED HEALTH CARE EDUCATION/TRAINING PROGRAM

## 2024-04-17 PROCEDURE — 250N000013 HC RX MED GY IP 250 OP 250 PS 637: Performed by: STUDENT IN AN ORGANIZED HEALTH CARE EDUCATION/TRAINING PROGRAM

## 2024-04-17 PROCEDURE — 85041 AUTOMATED RBC COUNT: CPT | Performed by: STUDENT IN AN ORGANIZED HEALTH CARE EDUCATION/TRAINING PROGRAM

## 2024-04-17 PROCEDURE — 250N000013 HC RX MED GY IP 250 OP 250 PS 637

## 2024-04-17 PROCEDURE — 250N000012 HC RX MED GY IP 250 OP 636 PS 637

## 2024-04-17 PROCEDURE — 80053 COMPREHEN METABOLIC PANEL: CPT | Performed by: STUDENT IN AN ORGANIZED HEALTH CARE EDUCATION/TRAINING PROGRAM

## 2024-04-17 PROCEDURE — 36415 COLL VENOUS BLD VENIPUNCTURE: CPT | Performed by: STUDENT IN AN ORGANIZED HEALTH CARE EDUCATION/TRAINING PROGRAM

## 2024-04-17 PROCEDURE — 99232 SBSQ HOSP IP/OBS MODERATE 35: CPT | Performed by: INTERNAL MEDICINE

## 2024-04-17 RX ORDER — DILTIAZEM HYDROCHLORIDE 120 MG/1
120 CAPSULE, COATED, EXTENDED RELEASE ORAL DAILY
Qty: 30 CAPSULE | Refills: 1 | Status: SHIPPED | OUTPATIENT
Start: 2024-04-18 | End: 2024-07-02

## 2024-04-17 RX ORDER — AMLODIPINE BESYLATE 5 MG/1
10 TABLET ORAL DAILY
Status: DISCONTINUED | OUTPATIENT
Start: 2024-04-17 | End: 2024-04-17

## 2024-04-17 RX ORDER — INSULIN GLARGINE 100 [IU]/ML
16 INJECTION, SOLUTION SUBCUTANEOUS AT BEDTIME
COMMUNITY
Start: 2024-04-17 | End: 2024-04-17

## 2024-04-17 RX ORDER — CIPROFLOXACIN 500 MG/1
500 TABLET, FILM COATED ORAL 2 TIMES DAILY
Qty: 6 TABLET | Refills: 0 | Status: SHIPPED | OUTPATIENT
Start: 2024-04-17 | End: 2024-04-17

## 2024-04-17 RX ORDER — HYDRALAZINE HYDROCHLORIDE 10 MG/1
10 TABLET, FILM COATED ORAL 2 TIMES DAILY
Status: DISCONTINUED | OUTPATIENT
Start: 2024-04-17 | End: 2024-04-17

## 2024-04-17 RX ORDER — INSULIN GLARGINE 100 [IU]/ML
20 INJECTION, SOLUTION SUBCUTANEOUS AT BEDTIME
Qty: 15 ML | Refills: 1 | Status: SHIPPED | OUTPATIENT
Start: 2024-04-17

## 2024-04-17 RX ORDER — LEVOFLOXACIN 500 MG/1
500 TABLET, FILM COATED ORAL DAILY
Qty: 3 TABLET | Refills: 0 | Status: ACTIVE | OUTPATIENT
Start: 2024-04-17 | End: 2024-09-20

## 2024-04-17 RX ORDER — HYDRALAZINE HYDROCHLORIDE 25 MG/1
25 TABLET, FILM COATED ORAL 2 TIMES DAILY
Qty: 60 TABLET | Refills: 1 | Status: SHIPPED | OUTPATIENT
Start: 2024-04-17 | End: 2024-06-04

## 2024-04-17 RX ADMIN — URSODIOL 250 MG: 250 TABLET ORAL at 08:15

## 2024-04-17 RX ADMIN — APIXABAN 5 MG: 5 TABLET, FILM COATED ORAL at 08:15

## 2024-04-17 RX ADMIN — DILTIAZEM HYDROCHLORIDE 120 MG: 120 CAPSULE, COATED, EXTENDED RELEASE ORAL at 08:15

## 2024-04-17 RX ADMIN — INSULIN ASPART 5 UNITS: 100 INJECTION, SOLUTION INTRAVENOUS; SUBCUTANEOUS at 14:48

## 2024-04-17 RX ADMIN — PREDNISONE 5 MG: 5 TABLET ORAL at 08:15

## 2024-04-17 RX ADMIN — ASPIRIN 81 MG CHEWABLE TABLET 81 MG: 81 TABLET CHEWABLE at 08:15

## 2024-04-17 RX ADMIN — METFORMIN ER 500 MG 1000 MG: 500 TABLET ORAL at 08:15

## 2024-04-17 RX ADMIN — MYCOPHENOLATE MOFETIL 500 MG: 500 TABLET, FILM COATED ORAL at 08:15

## 2024-04-17 RX ADMIN — LEVOTHYROXINE SODIUM 150 MCG: 0.15 TABLET ORAL at 08:15

## 2024-04-17 RX ADMIN — INSULIN ASPART 2 UNITS: 100 INJECTION, SOLUTION INTRAVENOUS; SUBCUTANEOUS at 08:16

## 2024-04-17 ASSESSMENT — ACTIVITIES OF DAILY LIVING (ADL)
ADLS_ACUITY_SCORE: 21
ADLS_ACUITY_SCORE: 24
ADLS_ACUITY_SCORE: 24
ADLS_ACUITY_SCORE: 21
ADLS_ACUITY_SCORE: 24
ADLS_ACUITY_SCORE: 24
ADLS_ACUITY_SCORE: 21
ADLS_ACUITY_SCORE: 26
ADLS_ACUITY_SCORE: 24
ADLS_ACUITY_SCORE: 24
ADLS_ACUITY_SCORE: 21
ADLS_ACUITY_SCORE: 24

## 2024-04-17 NOTE — PLAN OF CARE
Goal Outcome Evaluation:      Plan of Care Reviewed With: patient, spouse    Overall Patient Progress: improvingOverall Patient Progress: improving     D:Pt admitted with sepsis. Pt has history of liver transplant d/t ETOH, CAD with PCI afib with RVR,  HTN  prostrate ca,and DM    A/I Pt spiked temp max of 102.Pt has now been afebrile  and without compants. Antibiotics changed to Rocephen daily and if pt remains afebrile possible change to po and discharge tomorrow. Pt's lungs clear, BS positive, skin intact  P: Continue current POC and monitoring

## 2024-04-17 NOTE — DISCHARGE SUMMARY
[facility]  Hospitalist Discharge Summary      Date of Admission:  4/12/2024  Date of Discharge:  4/17/24  Discharging Provider: Colin Duarte MD  Discharge Service: Hospitalist Service, GOLD TEAM 7    Discharge Diagnoses   Severe sepsis due to gram negative organism  Recent history of ESBL E Coli pyelonephritis  Fevers  History of liver transplantation  Type II MI        Clinically Significant Risk Factors     # DMII: A1C = 8.4 % (Ref range: <5.7 %) within past 6 months         Follow-ups Needed After Discharge   Follow-up Appointments     Adult [facility] Follow-up and recommended labs and tests      Follow up with primary care provider, Blaze Kaminski, within   7 days to evaluate medication change and for hospital follow- up.        Follow up in the cardiology clinic within one month.      Appointments on [place] and/or [facility] (with [facility] or [facility]   provider or service). Call [phone] if you haven't heard regarding   these appointments within 7 days of discharge.            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 He was admitted on 4/12/2024 with sepsis and fevers complicated by  atrial fibrillation  with RVR.         # Severe sepsis  # Fevers   # History of ESBL in urine  # Immunosuppressed status    Admitted 4/12/2024 with acute onset of rigors and vomiting and sepsis.   There was no clear evidence of his sepsis and an extensive workup including CT of the chest, abdomen, and pelvis, blood cultures, and UA were  unrevealing.  Legionella neg, strep PNA neg, CMV neg, EBV neg, HSV neg. MRSA nares negative.  He was treated with a course of empiric antibiotics of vancomycin and meropenem as  well as azithromycin.  He was discharged to complete a course of levofloxacin.         # Type II NSTEMI 2/2 sepsis and uncontrolled Afib  # Paroxysmal atrial fibrillation, [id number] 3 (age+DM2+CAD)    Admitted with afib RVR with rates to 160s, improved rates with diltiazem gtt in the ED. Likely secondary to sepsis. He was discharged with plans for a ziopatch and then ongoing follow up with Cardiology. He was started on a DOAC and diltiazem for rate control.        # DEREK now resolved   Creatinine 1.29 on admission from baseline of ~0.8. Likely in setting of sepsis and afib RVR with poor perfusion.         ____________________________  Chronic/stable/improved:     # Hypothyroidism  3/2/2024 TSH mildly elevated at 5.47, free T4 wnl at 1.24. 4/13/2024 TSH 0.32.   - Continue levothyroxine 150mcg daily     # Cirrhosis related to alcohol c/b HCC s/p s/p DDLT (2018)  # Hx of biliary stricture requiring stent, last ERCP 2019  - Continue pta mycophenolate 500mg BID  - Continue pta prednisone 5mg daily   - Continue pta ursodiol 250mg BID     # HTN    He was transitioned to diltiazem and hydralazine and metoprolol and amlodipine stopped.     # CAD s/p PCI to mLAD   - continue pta atorvastatin 40mg daily  - given the use of a DOAC, aspirin was stopped.        # Type 2 diabetes  2/6/2024 A1c 9.2%.  Exact home regimen was not clear, but he was discharged on metformin and lantus 20 units nightly.    Consultations This Hospital Stay   PHARMACY TO DOSE VANCO  MEDICATION HISTORY IP PHARMACY CONSULT  PHYSICAL THERAPY ADULT IP CONSULT  OCCUPATIONAL THERAPY ADULT IP CONSULT  CARDIOLOGY GENERAL ADULT IP CONSULT  INFECTIOUS DISEASE TRANSPLANT SOT ADULT IP CONSULT  INFECTIOUS DISEASE TRANSPLANT SOT ADULT IP CONSULT  DIABETES EDUCATION IP CONSULT  PHARMACY LIAISON FOR MEDICATION COVERAGE CONSULT    Code Status   Prior    Time Spent on this Encounter   I, Colin Duarte MD, personally saw the patient today and spent greater than 30  minutes discharging this patient.       Colin Duarte MD  [facility] [address] [address]  Phone: [phone]  ______________________________________________________________________    Physical Exam   Vital Signs:                    Weight: 186 lbs 6.4 oz  General Appearance: Well appearing  Respiratory: clear to auscultation bilaterally with good air entry   Cardiovascular: normal rate, regular.  No murmurs, rubs, or gallops   GI: soft, non-tender, non-distended.  Well healed incisional scar  Skin: no rash  Other: No lower extremity edema.         Primary Care Physician   Jaswinder Anne    Discharge Orders      HC ZIO PATCH 8-14 DAYS APPLICATION    This order will incur an additional cost to the patient. If the patient doesn't want to pay cost for application, please consider the Zio Patch Mail Out order.     Primary Care - Care Coordination Referral      Adult Cardiology Renukaal  Referral      Reason for your hospital stay    You were admitted to the hospital for fevers and concern for an infection, along with a very fast heart rate.  You were given antibiotics and seen by infectious disease and cardiology and found to have something called atrial fibrillation.  You were started on 2 medicines - one to control your heart rate and one to decrease your risk of stroke.    It is really important to only take the medicines you are prescribed at discharge, since they have changed.    Your dose of insulin is lower than at admission; please work with your outpatient team to increase it as your intake and sugars get closer back to normal for you.     Activity    Your activity upon discharge: activity as tolerated     Adult [facility] Follow-up and recommended labs and tests    Follow up with primary care provider, Blaze Kaminski, within 7 days to evaluate medication change and for hospital follow- up.        Follow up in the cardiology clinic within one  month.      Appointments on [place] and/or [facility] (with [facility] or [facility] provider or service). Call [phone] if you haven't heard regarding these appointments within 7 days of discharge.     Diet    Follow this diet upon discharge: Orders Placed This Encounter      Low Saturated Fat Na <2400 mg     ZIO PATCH MAIL OUT    Pt speaks Turkmen: Please call pt with   He has a [address]  [place] [address]  Cell: [phone]      but his physical address is:  [address] [place], [place] [address]     [address]  [place] [address]    24/7 Customer Service  [phone]  --Mr Limon, please check you po box every day for the Ziopatch  If you need assistance in putting on your body, call your primary care clinic.  Ziopatch should contact you--if you have not heard from them by Monday, 4/22/24 please call them.       Significant Results and Procedures   Most Recent 3 CBC's:  Recent Labs   Lab Test 05/21/24  1633 05/02/24  1348 04/17/24  0607   WBC 7.4 6.6 5.2   HGB 12.2* 12.0* 12.4*   MCV 85 88 83    287 188     Most Recent 3 BMP's:  Recent Labs   Lab Test 05/21/24  1633 05/02/24  1348 04/17/24  1446 04/17/24  0607    135  --  136   POTASSIUM 4.6 5.1  --  4.0   CHLORIDE 105 102  --  104   CO2 24 23  --  22   BUN 24.2* 17.2  --  16.4   CR 1.26* 0.97  --  0.88   ANIONGAP 8 10  --  10   YELENA 8.7* 8.7*  --  8.4*   * 404* 369* 193*       Discharge Medications   Discharge Medication List as of 4/17/2024  3:51 PM        START taking these medications    Details   apixaban ANTICOAGULANT (ELIQUIS) 5 MG tablet Take 1 tablet (5 mg) by mouth 2 times daily, Disp-60 tablet, R-1, E-Prescribe      diltiazem ER COATED BEADS (CARDIZEM CD/CARTIA XT) 120 MG 24 hr capsule Take 1 capsule (120 mg) by mouth daily, Disp-30 capsule, R-1, E-Prescribe      levofloxacin (LEVAQUIN) 500 MG tablet Take 1 tablet (500 mg) by mouth daily, Disp-3 tablet, R-0, E-Prescribe           CONTINUE these medications which have CHANGED    Details    hydrALAZINE (APRESOLINE) 25 MG tablet Take 1 tablet (25 mg) by mouth 2 times daily, Disp-60 tablet, R-1, E-Prescribe      insulin glargine (LANTUS SOLOSTAR) 100 UNIT/ML pen Inject 20 Units Subcutaneous at bedtime, Disp-15 mL, R-1, E-PrescribeIf Lantus is not covered by insurance, may substitute Basaglar or Semglee or other insulin glargine product per insurance preference at same dose and frequency.             CONTINUE these medications which have NOT CHANGED    Details   atorvastatin (LIPITOR) 40 MG tablet Take 40 mg by mouth daily, Historical      metFORMIN (GLUCOPHAGE XR) 500 MG 24 hr tablet Take 1,000 mg by mouth 2 times daily (with meals), Historical      methocarbamol (ROBAXIN) 500 MG tablet Take 500 mg by mouth 4 times daily as needed for muscle spasms, Historical      mycophenolate (GENERIC EQUIVALENT) 500 MG tablet Take 500 mg by mouth 2 times daily, Historical      predniSONE (DELTASONE) 5 MG tablet Take 5 mg by mouth daily, Historical      VITAMIN D3 25 MCG (1000 UT) tablet Take 2 tablets by mouth daily at 2 pm, ROBERTO, Historical      levothyroxine (SYNTHROID/LEVOTHROID) 150 MCG tablet Take 150 mcg by mouth daily, Historical      ursodiol (ACTIGALL) 250 MG tablet Take 250 mg by mouth 2 times daily, Historical           STOP taking these medications       amLODIPine (NORVASC) 10 MG tablet Comments:   Reason for Stopping:         aspirin (ASA) 81 MG chewable tablet Comments:   Reason for Stopping:         metoprolol tartrate (LOPRESSOR) 25 MG tablet Comments:   Reason for Stopping:             Allergies   No Known Allergies

## 2024-04-17 NOTE — PROGRESS NOTES
-Pt received orders that were carried out to discharge to home.   -Discharging physician spoke at length with the Pt via an   .  -Pt and his wife and daughter reviewed his discharge instructions, orders, follow-up appointments and prescriptions with the use of an .  -Pt transported home with family.  -Pt fitted with Zio patch monitor prior to discharge.

## 2024-04-17 NOTE — PROGRESS NOTES
[facility]    Division of Infectious Diseases and International Medicine    TRANSPLANT INFECTIOUS DISEASE INPATIENT CONSULTATION NOTE   Patient:  Loy Limon, Date of birth 1955, Medical record number [id number]  Referred by: No ref. provider found   Reason for Consult: Fever       Assessment and Recommendations   Recommendations  Continue CTX while in-house .   Upon discharge, can switch to levofloxacin with plans to complete a 7 day course of antibiotics for possible Gram-negative sepsis (Dates: 4/13-4/19)  If he were to re-develop  fevers, would send repeat blood cultures and a Karius test to ensure we are not missing additional pathogens.   He will benefit from PCV-20 and RSV vaccines  prior to discharge. Has already received the spring COVID-19 booster.   He has a follow-up appointment with me scheduled on May 7th at 2pm.     Transplant infectious diseases will sign off at this time.     Alessandra Capone MD  [phone]    Assessment  Mr. 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 He was admitted on 4/13 with unexplained fevers, for which we are being consulted.     Fevers, sepsis  History of ESBL E coli pyelonephritis, resolved  At this point, no clear etiology of ongoing fevers. CT C/A/P relatively unremarkable. In fact, the CT of his chest from admission looks actually looks improved from the CT chest on 3/22/24. UA relatively unremarkable and urine culture with mixed urogenital luc. Blood cultures NGTD. Regarding exposure history, he is [place]end[place], but negative antibodies in 2018. He did travel to [facility] in Feb, but only stayed with family in the city and was without animal exposure.  On the differential would be Listeria (does eat soft cheeses and raw vegetables), which should be detected on  routine blood cultures. 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 No known tick bite (and is a bit early in the season). Doesn't have animals. No undercooked meat. No one in the family sick. At this point, plan for total of 7 day course of antibiotics for possible Gram negative sepsis with negative cultures.     Other Infectious Disease issues include:  - QTc interval: 502 (4/13/24)  - Bacterial prophylaxis: None  - Pneumocystis prophylaxis: Not applicable  - Serostatus & viral prophylaxis: CMV D-/R+; EBV D+/R+   - Fungal prophylaxis: Not applicable  - Isolation status: Contact (ESBL history)  - Code status: Full Code     Recent Labs   Lab Test 04/12/24  2117   IGG 1,143      Prior infectious diseases issues   + Quant 7/19/2018. Was initially started on [id number]/kg PO x4 months on 8/21/2018 (end date ~12/20/2024) but only completed 2.5 months worth. On ID clinic visit with Dr Clancy on 2/5/2019, was transitioned over to INH 300mg daily (due to rifampin DDI with transplant immunosuppressants) which he took for an additional ~2 months with end date on ~4/9/2019.   ESBL E coli Pyelonephritis: Admitted from 2/28-3/9 with ESBL E coli pyelonephritis. Treated with 14 days of ertapenem (3/1/2024-3/14/2024).    CAP: Occurred during 2/28-3/9 admission. BAL negative for fungal etiology. Given azithro x3 days and ertapenem (see above). AFB x3 negative.        Interval events    Doing very  well today. Ready to discharge to home. No additional questions or concerns today.          Prior Antibiotics  CTX: 4/16-current  Vancomycin: 4/14-4/16  Meropenem: 4/13-4/16  Azithro: 4/14-4/15  TMP/SMX: 3/22- 3/29  Ertapenem: 3/1-3/24    Relevant Labs/Imaging   Microbiology Lab  4/4: Blood cultures NGTD   4/12: Blood cultures NGTD  4/12: Urine culture with urogenital luc (UA with small nitrites/LE)  4/12: Legionella/S pneumo/RVP negative     Imaging   4/14: Renal US normal  "    4/14: CT C/A/P  1. Lower lobe predominant peribronchovascular groundglass opacities,  likely representing infectious/inflammatory process.  2. Multifocal wedge-shaped hypoenhancement in the bilateral kidneys,  most pronounced on the right, concerning for pyelonephritis. Recommend  clinical correlation with urinalysis.  3. Minimal pulmonary artery enlargement which may indicate pulmonary  artery hypertension.  4. Small early/small vessel disease in the lung bases without  focal consolidation. No obvious acute inflammatory process in the abdomen or pelvis by CT. Diffuse atherosclerosis. Mitral annular calcifications and left atrial enlargement, please correlate for mitral valve disorder. Cholecystectomy.       Other Medical History:     Attempt was made to collect past, family and social history during this encounter,  this information was reviewed with the patient and updated    Allergies Patient has no known allergies.    Past Medical History  No past medical history on file. PastSurgical History   has no past surgical history on file.   Family History  No family history on file. Social History  He    Notable Exposures Listed below if pertinent    Vaccination History:  Immunization History   Administered Date(s) Administered    COVID-19 12+ (2023-24) (Pfizer) 03/20/2024    COVID-19 Bivalent 12+ (Pfizer) 10/26/2022, 05/09/2023    COVID-19 Monovalent 18+ (Moderna) 03/01/2021, 03/30/2021, 09/21/2021, 04/26/2022             Physical Exam:     VITAL SIGNS:  Blood pressure (!) 149/80, pulse 66, temperature 98  F (36.7  C), temperature source Oral, resp. rate 20, height 1.75 m (5' 8.9\"), weight 84.6 kg (186 lb 6.4 oz), SpO2 98%.     GENERAL APPEARANCE: Not in acute distress. Lying in bed.     PHYSICAL EXAM:   Eyes:     no ptosis, no discharge, no scleral icterus  Mouth, Throat:     mucous membranes moist  Cardiovascular:    Inspection: No Cyanosis, JVD not elevated   Auscultation:  S1, S2 normal, regular rate and " rhythm  Respiratory:     Inspection: Not in respiratory distress, Chest expansion symmetrical   Auscultation: 4 point auscultation done clear to auscultation bilaterally, no wheezes, no rales, and no rhonchi  Gastrointestinal:      soft, non-tender; bowel sounds normal; no masses  Musculoskeletal:     no elbow wrist knee or ankle tenderness, deformity or swelling, no quadriceps calf or upper arm muscular tenderness noted  Skin:     Dry and intact   No rashes  Neurologic:     Higher Mental Function: Conversant, AOx4   Facial asymmetry grossly absent   is ambulatory  Psychiatric:     Appropriate           Laboratory Data:   Metabolic Studies       Recent Labs   Lab Test 04/17/24  0607 04/16/24  1011 04/16/24  0527 04/15/24  0959 04/15/24  0439 04/14/24  1253 04/14/24  1134 04/14/24  0840 04/12/24  2121 04/12/24  2117     --  130*  --  135  --  133*  --    < > 136   POTASSIUM 4.0  --  4.0  --  4.2  --  4.2  --    < > 3.9   CHLORIDE 104  --  99  --  101  --  103  --    < > 106   CO2 22  --  21*  --  22  --  19*  --    < > 17*   ANIONGAP 10  --  10  --  12  --  11  --    < > 13   BUN 16.4  --  17.3  --  13.5  --  15.9  --    < > 22.4   CR 0.88  --  0.96  --  0.94  --  0.89  --    < > 1.29*   GFRESTIMATED >90  --  85  --  87  --  >90  --    < > 60*   *   < > 285*   < > 149*   < > 304*  --    < > 207*   A1C  --   --  8.4*  --   --   --   --   --   --   --    YELENA 8.4*  --  8.2*  --  8.7*  --  8.6*  --    < > 8.7*   MAG  --   --   --   --   --   --   --   --   --  1.7   LACT  --   --   --   --  1.6  --   --  0.9   < >  --    PCAL  --   --   --   --   --   --  30.80*  --   --  0.55*   FGTL  --   --   --   --   --   --  <31  --   --   --     < > = values in this interval not displayed.       Hepatic Studies    Recent Labs   Lab Test 04/17/24  0607 04/16/24  0527 04/15/24  0439   BILITOTAL 0.3 0.5 0.8   ALKPHOS 163* 159* 176*   PROTTOTAL 5.8* 5.6* 6.4   ALBUMIN 3.0* 3.0* 3.3*   AST 20 20 20   ALT 18 17 13  "      Hematology Studies      Recent Labs   Lab Test 04/17/24  0607 04/16/24  0527 04/15/24  0439 04/14/24  1134 04/13/24  0557 04/12/24 2117   WBC 5.2 4.7 6.0 8.6 9.1 5.5   HGB 12.4* 12.4* 13.8 13.3 11.8* 14.2   HCT 37.2* 37.3* 42.8 40.9 37.4* 43.2    166 167 164 183 180       Arterial Blood Gas Testing    Recent Labs   Lab Test 04/12/24  2131   O2PER 21        Medication levels    Recent Labs   Lab Test 04/15/24  1541   VANCOMYCIN 10.5       No results found for: \"ACD4\"    Inflammatory Markers  No lab results found.    Invalid input(s): \"AUTO\", \"WESR\"    Immune Globulin Studies     Recent Labs   Lab Test 04/12/24 2117   IGG 1,143       Pancreatitis testing    Recent Labs   Lab Test 04/13/24  0029   LIPASE 19       Gout Labs    No lab results found.    Clotting Studies    Recent Labs   Lab Test 04/13/24 0557 04/12/24 2117   INR 1.21* 1.07   PTT  --  26         Markers  No lab results found.    Invalid input(s): \"FETOPROTEIN\", \"SERUM\", \"AFP\"    Autoimmune Testing  No lab results found.    Invalid input(s): \"PRO3\", \"C3\", \"C4\", \"VASPAN\"    Thyroid Studies     Recent Labs   Lab Test 04/13/24  0029   TSH 0.32       Urine Studies     Recent Labs   Lab Test 04/12/24 2134   URINEPH 6.0   NITRITE Negative   LEUKEST Small*   WBCU 79*       CSF testing   No lab results found.    Invalid input(s): \"CADAM\", \"EVPCR\", \"ENTPCR\", \"ENTEROVIRUS\"    Body fluid stats  No lab results found.    Microbiology:  Fungal testing  Recent Labs   Lab Test 04/14/24  1134   FGTL <31       Culture   Date Value Ref Range Status   04/14/2024 No growth after 2 days  Preliminary   04/14/2024 No growth after 2 days  Preliminary   04/12/2024 >100,000 CFU/mL Mixture of Urogenital Liane  Final   04/12/2024 No growth after 4 days  Preliminary   04/12/2024 No growth after 4 days  Preliminary   (    Last check of C difficile  No results found for: \"CDBPCT\"    Infection Studies to assess Diarrhea No lab results found.    Virology:  Coronavirus-19 " "testing    Recent Labs   Lab Test 04/12/24  2330   [id number] Negative       Respiratory virus testing    Recent Labs   Lab Test 04/12/24  2330   IFLUA Not Detected   INFZA Negative   FLUAH1 Not Detected   OA9506 Not Detected   FLUAH3 Not Detected   IFLUB Not Detected   INFZB Negative   PIV1 Not Detected   PIV2 Not Detected   PIV3 Not Detected   PIV4 Not Detected   IRSV Negative   RSVA Not Detected   RSVB Not Detected   HMPV Not Detected   ADENOV Not Detected   OROPEZA Not Detected       No results found for: \"CMVIGG\", \"CMVM\", \"CMVIM\", \"CMIG\", \"CMVG\", \"CMIGG\", \"CMIM\", \"CMVIGM\", \"CMLTX\", \"HSVG1\", \"HSVG2\", \"HSVTP1\", \"QG9733\", \"HS12M\", \"HS12GR\", \"HS1GR\", \"HS2GR\", \"HSIM\", \"HSIG\", \"HSIGR\", \"HSVIGMAB\", \"VZVIGG\", \"VARICZOSAB\"  No results found for: \"EBVCAG\", \"EBIG2\", \"EBIGM\", \"EBVIGG\", \"EBIGG\", \"EBVAGN\", \"SM6004\", \"TOXG\"  No results found for: \"H1IGG\", \"H2IGG\", \"EBVCAM\"    CMV viral loads    Recent Labs   Lab Test 04/14/24  1256   CMVQNT Not Detected       CMV resistance testing  No lab results found.    No results found for: \"H6RES\"    No results found for: \"EBRES\"    No results found for: \"73588\", \"BKRES\"    Parvovirus Testing  No lab results found.    Invalid input(s): \"PRVRES\"    Adenovirus Testing  No lab results found.    Invalid input(s): \"ADENAB\", \"ADENOVIRUS\", \"ADQT\"    Hepatitis B Testing   No lab results found.   No results found for: \"HCVAB\", \"HQTG\", \"HCGENO\", \"HCPCR\", \"HQTRNA\", \"HEPRNA\", \"CRYOG\"    Imaging:  No results found for this or any previous visit (from the past 48 hour(s)).       "

## 2024-04-17 NOTE — PLAN OF CARE
Goal Outcome Evaluation:      Plan of Care Reviewed With: patient    Overall Patient Progress: improvingOverall Patient Progress: improving     .    Changes: No changes during the shift      History: Loy Limon is a 68 year old male with a history of cirrhosis related to alcohol c/b HCC s/p DDLT (2018), CAD s/p PCI, afib, HTN, T2DM, hypothyroidism, history of prostate cancer s/p RALP (2020). He was admitted on 4/12/2024 with sepsis and FUO and afib with RVR.        Neuro: A/Ox4. Calls appropriately. Pleasant and cooperative. Macedonian speaking, needs  for anything more than simple questions.   Cardiac/Tele:  Chronic AFib 60-80. VSS. Afebrile. Denies chest pain   Respiratory: Room air. Tolerating well  GI/: Continent. Urinal at bedside.  Diet/Appetite: Low saturated fat diet. Eats food from home.   Skin: No new deficits noted.   LDAs: R PIV- SL  Activity: SBA  Pain: Denies pain.      Plan: Continue to monitor and notify team with changes.

## 2024-04-17 NOTE — PROGRESS NOTES
Care Coordinator  D/I: I am assisting bedside RN as he asked me to help. Per bedside RN Ji, pt has a Ziopatch ordered to be mailed, but Ji is concerned that pt may not receive the Ziopatch with his [address] address : [address] [place] [address]  Ji asked and pt's physical address is: [address]   [address]  [place] [address]  I called admissions: [phone] and he will not change the po box address.  I called EK tech: Dawood [phone] and he verified that the order routes to [employer] in [place] and they do a same day mail order and believe that they have [place] instructions.    P: We Updated the [employer] order to include:  The 24/7 phone # to call [employer] @ [phone]--and they should contact pt with a .  Ji will instruct pt to check his PO box every day  If pt cannot place the Ziopatch on his body then he should make an appointment to go into his primary care clinic to have them assist.

## 2024-04-17 NOTE — CONSULTS
Loy Limon is a 68 year old male with T2DM, suboptimal control (A1c 8.4% on 4/16/24). Diabetes Educator was consulted for education on using his long acting insulin as needed. Patient discharging home with family now. Discussed with DALLIN Workman. He will review insulin plan and importance of not missing insulin doses with patient. Will not see patient.     Maik Mann DNP, [facility]  Diabetes Educator  Pager: [phone]  Office: [phone]  Securely message with Martina

## 2024-04-18 ENCOUNTER — NURSE TRIAGE (OUTPATIENT)
Dept: NURSING | Facility: CLINIC | Age: 69
End: 2024-04-18

## 2024-04-18 ENCOUNTER — PATIENT OUTREACH (OUTPATIENT)
Dept: CARE COORDINATION | Facility: CLINIC | Age: 69
End: 2024-04-18

## 2024-04-18 ENCOUNTER — OFFICE VISIT (OUTPATIENT)
Dept: FAMILY MEDICINE | Facility: CLINIC | Age: 69
End: 2024-04-18
Payer: COMMERCIAL

## 2024-04-18 VITALS
BODY MASS INDEX: 27.95 KG/M2 | OXYGEN SATURATION: 96 % | HEART RATE: 68 BPM | TEMPERATURE: 97.3 F | DIASTOLIC BLOOD PRESSURE: 83 MMHG | WEIGHT: 188.7 LBS | SYSTOLIC BLOOD PRESSURE: 132 MMHG

## 2024-04-18 DIAGNOSIS — Z09 HOSPITAL DISCHARGE FOLLOW-UP: ICD-10-CM

## 2024-04-18 DIAGNOSIS — I10 ESSENTIAL HYPERTENSION: ICD-10-CM

## 2024-04-18 DIAGNOSIS — Z94.4 LIVER TRANSPLANTED (H): Primary | ICD-10-CM

## 2024-04-18 DIAGNOSIS — A41.9 SEPSIS, DUE TO UNSPECIFIED ORGANISM, UNSPECIFIED WHETHER ACUTE ORGAN DYSFUNCTION PRESENT (H): Primary | ICD-10-CM

## 2024-04-18 DIAGNOSIS — E11.9 DM TYPE 2, GOAL HBA1C 7%-8% (H): ICD-10-CM

## 2024-04-18 PROCEDURE — 99215 OFFICE O/P EST HI 40 MIN: CPT | Performed by: FAMILY MEDICINE

## 2024-04-18 RX ORDER — RESPIRATORY SYNCYTIAL VIRUS VACCINE 120MCG/0.5
0.5 KIT INTRAMUSCULAR ONCE
Qty: 1 EACH | Refills: 0 | Status: CANCELLED | OUTPATIENT
Start: 2024-04-18 | End: 2024-04-18

## 2024-04-18 NOTE — TELEPHONE ENCOUNTER
Received refill request for Ursodiol 250 MG Tab from [facility].  Bhavana Aviles,  Nephrology Clinic Navigator  [facility]  Direct: [phone].

## 2024-04-18 NOTE — TELEPHONE ENCOUNTER
I called daughter and coordinated scheduling for today for hospital follow up. This matter will be discussed, too.

## 2024-04-18 NOTE — PROGRESS NOTES
Clinic Care Coordination Contact    Situation: Patient chart reviewed by care coordinator.    Background: Referred by Inpatient CC for no referral reason on 4/17/24.    Assessment: [employer] message sent to patient offering CC services.     Plan/Recommendations: RN to await pt's reply for further outreach.    ADIA DICKSON RN on 4/18/2024 at 8:10 AM    Addendum:  Patient saw PCP in clinic for hospital follow up. Per patient's office visit notes, no needs identified for care coordination. RN will close program at this time.     ADIA DICKSON RN on 4/23/2024 at 3:03 PM

## 2024-04-18 NOTE — PROGRESS NOTES
Assessment & Plan         Sepsis, due to unspecified organism, unspecified whether acute organ dysfunction present (H)  No new sx since home one day, will finish the meds and keep ID fu    Essential hypertension  Cont as is  - Adult Cardiology Eval  Referral; Future    DM type 2, goal HbA1c 7%-8% (H)  Cont as is  - Adult Cardiology Eval  Referral; Future      45 minutes spent by me on the date of the encounter doing chart review, history and exam, documentation and further activities per the note; see me one mo; all discussed via . Daughter on phone w/ questions we answer.    MED REC REQUIRED{  Post Medication Reconciliation Status:       Return in about 1 month (around 5/18/2024).    Teddy Granado is a 68 year old, presenting for the following health issues:  Hospital F/U (Pt was in ED from 4/12 - 4/17 for sepsis, afib. Pt reports he had been seen previously for an infection he was treated for, but it came back, so he returned to the hospital 4/12. Pt states he's feeling better, was told there's nothing else in his blood. He was prescribed new medications - Eliquis (5mg), Hydralazine HCL (25mg), Diltiazem HCL ER Coated  CP24 to complete after discharge, but is concerned he is experiencing some side effects (sleep problems) of this new medication)      4/18/2024     2:33 PM   Additional Questions   Roomed by JEA EMT     HPI [facility] inpt above dates; home one day  Daughter on phone, in room w/   1-levaquin keeps him awake he thinks; for the remaining few doses he'll take at six pm instead of nine pm   2-no new sx or problems since home one day  3-rvwd rec's to see general cardio, reasons, no angina on ROS, will refer  4-DM labs utd  5-rvwd meds, he brought all, they are correct, he had the bottles I looked over myself  6-I wrote down for him to get rsv shot at drugstore  Past Medical History:   Diagnosis Date    Anemia     Biliary stricture of transplanted liver  (H) 2018    BPH (benign prostatic hyperplasia)     Cancer (H)     hepatocellualr carcinoma    Cirrhosis of liver (H) 2018    Diabetes (H)     Enlarged prostate     Hypothyroidism     Impotence of organic origin 2020    Inguinal hernia     Repaired with mesh on 18    Liver lesion 2018    Liver transplanted (H) 2018     donor liver transplant    Portal vein thrombosis     on path explant    Postoperative atrial fibrillation (H) 2018    Prostate cancer (H)     TB lung, latent     Treated      Past Surgical History:   Procedure Laterality Date    APPENDECTOMY      BRONCHOSCOPY (RIGID OR FLEXIBLE), DIAGNOSTIC N/A 2024    Procedure: BRONCHOSCOPY, WITH BRONCHOALVEOLAR LAVAGE;  Surgeon: Jimmy Farley MD;  Location:  GI    COLONOSCOPY          CV CORONARY ANGIOGRAM N/A 10/13/2021    Procedure: CV CORONARY ANGIOGRAM;  Surgeon: Yaya Hampton MD;  Location:  HEART CARDIAC CATH LAB    CV CORONARY LITHOTRIPSY PCI N/A 10/13/2021    Procedure: CV Coronary Lithotripsy PCI;  Surgeon: Yaya Hampton MD;  Location:  HEART CARDIAC CATH LAB    CV INSTANTANEOUS WAVE-FREE RATIO N/A 10/13/2021    Procedure: Instantaneous Wave-Free Ratio;  Surgeon: Yaya Hampton MD;  Location:  HEART CARDIAC CATH LAB    CV INTRAVASULAR ULTRASOUND N/A 10/13/2021    Procedure: Intravascular Ultrasound;  Surgeon: Yaya Hampton MD;  Location:  HEART CARDIAC CATH LAB    CV PCI STENT DRUG ELUTING N/A 10/13/2021    Procedure: Percutaneous Coronary Intervention Stent Drug Eluting;  Surgeon: Yaya Hampton MD;  Location:  HEART CARDIAC CATH LAB    DAVINCI PROSTATECTOMY N/A 2020    Procedure: Robot-assisted laparoscopic radical prostatectomy, Bladder biopsy;  Surgeon: Kenrick Casiano MD;  Location: [facility]    ENDOSCOPIC RETROGRADE CHOLANGIOPANCREATOGRAM N/A 2018    Procedure: ENDOSCOPIC  "RETROGRADE CHOLANGIOPANCREATOGRAM, with Billary Sphincterotomy and Billary Stent Placement;  Surgeon: Omero Lawler MD;  Location: [facility] OR    ENDOSCOPIC RETROGRADE CHOLANGIOPANCREATOGRAM  2019    Procedure: COMBINED ENDOSCOPIC RETROGRADE CHOLANGIOPANCREATOGRAPHY, Bile Duct Stent Exchange;  Surgeon: Omero Lawler MD;  Location: [facility]    ENDOSCOPIC RETROGRADE CHOLANGIOPANCREATOGRAM N/A 2019    Procedure: ENDOSCOPIC RETROGRADE CHOLANGIOPANCREATOGRAPHY, WITH BILIARY STENT REMOVAL AND STONE EXTRACTION;  Surgeon: Omero Lawler MD;  Location: [facility] OR    HERNIORRHAPHY INGUINAL  2018    Procedure: Herniorrhaphy inguinal;  Surgeon: Emil Echevarria MD;  Location: [facility]    IR LIVER BIOPSY PERCUTANEOUS  2018    LAPAROTOMY EXPLORATORY      per the patient \"for infection in the LLQ\"     PICC INSERTION Right 2024    47 cm basilic    TRANSPLANT LIVER RECIPIENT  DONOR N/A 2018    Procedure: TRANSPLANT LIVER RECIPIENT  DONOR;  Surgeon: Emil Echevarria MD;  Location: [facility]     Current Outpatient Medications   Medication Sig Dispense Refill    Alcohol Swabs PADS Use to swab the area of the injection or fam as directed Per insurance coverage 200 each 1    apixaban ANTICOAGULANT (ELIQUIS) 5 MG tablet Take 1 tablet (5 mg) by mouth 2 times daily 60 tablet 1    aspirin (ASA) 81 MG chewable tablet Take 1 tablet (81 mg) by mouth daily for 360 days 90 tablet 3    atorvastatin (LIPITOR) 40 MG tablet Take 40 mg by mouth daily      atorvastatin (LIPITOR) 40 MG tablet Take 1 tablet (40 mg) by mouth daily 90 tablet 3    blood glucose (NO BRAND SPECIFIED) lancets standard To use to test glucose level in the blood Use to test blood sugar  3  times daily as directed. To accompany glucose monitor brands per insurance coverage. 200 each 1    blood glucose (NO BRAND SPECIFIED) lancets standard Use to test blood sugar 2 times daily or as directed. 50 lancet 3    blood " glucose (NO BRAND SPECIFIED) test strip To use to test glucose level in the blood Use to test blood sugar  3 times daily as directed. To accompany glucose monitor brands per insurance coverage. 100 strip 3    blood glucose (NO BRAND SPECIFIED) test strip Use to test blood sugar 2 times daily or as directed. One touch ultra test strips 50 strip 5    blood glucose (ONE TOUCH SURESOFT) lancing device Lancing device to be used with lancets. 1 each 0    blood glucose monitoring (NO BRAND SPECIFIED) meter device kit Use as directed Per insurance coverage 1 kit 0    blood glucose monitoring (ONE TOUCH ULTRA 2) meter device kit Use to test blood sugar 2 times daily or as directed. 1 kit 0    blood glucose monitoring (ONE TOUCH ULTRASOFT) lancets Use to test blood sugar 2 times daily. 100 each 6    diltiazem ER COATED BEADS (CARDIZEM CD/CARTIA XT) 120 MG 24 hr capsule Take 1 capsule (120 mg) by mouth daily 30 capsule 1    hydrALAZINE (APRESOLINE) 25 MG tablet Take 1 tablet (25 mg) by mouth 2 times daily 60 tablet 1    hydrALAZINE (APRESOLINE) 25 MG tablet Take 1 tablet (25 mg) by mouth 2 times daily 180 tablet 3    insulin glargine (LANTUS PEN) 100 UNIT/ML pen Inject 28 units subcutaneous at hs. 20 mL 3    insulin glargine (LANTUS SOLOSTAR) 100 UNIT/ML pen Inject 20 Units Subcutaneous at bedtime 15 mL 1    insulin pen needle (31G X 5 MM) 31G X 5 MM miscellaneous Use one  pen needles daily or as directed. 100 each 3    levofloxacin (LEVAQUIN) 500 MG tablet Take 1 tablet (500 mg) by mouth daily 3 tablet 0    levothyroxine (SYNTHROID/LEVOTHROID) 150 MCG tablet Take 150 mcg by mouth daily      levothyroxine (SYNTHROID/LEVOTHROID) 150 MCG tablet Take 1 tablet (150 mcg) by mouth daily 90 tablet 3    metFORMIN (GLUCOPHAGE XR) 500 MG 24 hr tablet Take 1,000 mg by mouth 2 times daily (with meals)      metFORMIN (GLUCOPHAGE XR) 500 MG 24 hr tablet Take 2 tablets (1,000 mg) by mouth 2 times daily (with meals) 360 tablet 3     methocarbamol (ROBAXIN) 500 MG tablet Take 500 mg by mouth 4 times daily as needed for muscle spasms      mycophenolate (GENERIC EQUIVALENT) 250 MG capsule Take 2 capsules (500 mg) by mouth 2 times daily for 90 days 90 capsule 3    mycophenolate (GENERIC EQUIVALENT) 500 MG tablet Take 500 mg by mouth 2 times daily      predniSONE (DELTASONE) 5 MG tablet Take 5 mg by mouth daily      predniSONE (DELTASONE) 5 MG tablet Take 1 tablet (5 mg) by mouth daily 90 tablet 1    sulfamethoxazole-trimethoprim (BACTRIM DS) 800-160 MG tablet Take 1 tablet by mouth 2 times daily 14 tablet 0    ursodiol (ACTIGALL) 250 MG tablet Take 250 mg by mouth 2 times daily      ursodiol (ACTIGALL) 250 MG tablet Take 1 tablet (250 mg) by mouth 2 times daily 180 tablet 3    Vitamin D3 (CHOLECALCIFEROL) 25 mcg (1000 units) tablet Take 2 tablets (50 mcg) by mouth daily 180 tablet 3    VITAMIN D3 25 MCG (1000 UT) tablet Take 2 tablets by mouth daily at 2 pm       No current facility-administered medications for this visit.     No Known Allergies  Family History   Problem Relation Age of Onset    Memory loss Mother     Liver Disease Brother     Skin Cancer No family hx of     Melanoma No family hx of      Social History     Socioeconomic History    Marital status:      Spouse name: Not on file    Number of children: Not on file    Years of education: Not on file    Highest education level: Not on file   Occupational History    Not on file   Tobacco Use    Smoking status: Former     Current packs/day: 0.00     Average packs/day: (1.4 ttl pk-yrs)     Types: Cigarettes, Cigars     Start date: 1970     Quit date: 3/14/2018     Years since quittin.1    Smokeless tobacco: Never    Tobacco comments:     Quit smoking 2018, one cigarette after dinner   Vaping Use    Vaping status: Never Used   Substance and Sexual Activity    Alcohol use: No     Comment: Quit drinking 2018    Drug use: No    Sexual activity: Not on file   Other Topics  Concern    Parent/sibling w/ CABG, MI or angioplasty before 65F 55M? Not Asked   Social History Narrative    Born in [place], came to [place] 30 years ago.     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cardboard, trimming meats     Social Determinants of Health     Financial Resource Strain: Not on file   Food Insecurity: Not on file   Transportation Needs: Not on file   Physical Activity: Not on file   Stress: Not on file   Social Connections: Not on file   Interpersonal Safety: Low Risk  (3/20/2024)    Interpersonal Safety     Do you feel physically and emotionally safe where you currently live?: Yes     Within the past 12 months, have you been hit, slapped, kicked or otherwise physically hurt by someone?: No     Within the past 12 months, have you been humiliated or emotionally abused in other ways by your partner or ex-partner?: No   Housing Stability: Not on file             Objective    /83 (BP Location: Right arm, Patient Position: Sitting, Cuff Size: Adult Large)   Pulse 68   Temp 97.3  F (36.3  C) (Oral)   Wt 85.6 kg (188 lb 11.2 oz)   SpO2 96%   BMI 27.95 kg/m    Body mass index is 27.95 kg/m .  Physical Exam   GENERAL: alert and no distress  NECK: no adenopathy, no asymmetry, masses, or scars  RESP: lungs clear to auscultation - no rales, rhonchi or wheezes  CV: regular rate and rhythm, normal S1 S2, no S3 or S4, no murmur, click or rub, no peripheral edema  ABDOMEN: soft, nontender, no hepatosplenomegaly, no masses and bowel sounds normal  MS: no gross musculoskeletal defects noted, no edema            Signed Electronically by: Jaswinder Anne MD

## 2024-04-18 NOTE — TELEPHONE ENCOUNTER
Nurse Triage SBAR    Is this a 2nd Level Triage? Yes    Situation: levofloxacin (LEVAQUIN) 500 MG tablet  Concerns/Pt not sleeping and thinking it is a side affect of the medication.    Pt just got discharged yesterday.  Was prescribed Levofloxacin.  Pt was not able to sleep last night.   Family thinks it is a side affect of the Levofloxacin.    Please call the family back @ [phone] for further assistance.    Thank you     Belkys Rodriguez RN  Central Triage Red Flags/Med Refills      Protocol Recommended Disposition:   Discuss With PCP And Callback By Nurse Within 1 Hour      Reason for Disposition   Caller has URGENT medicine question about med that PCP or specialist prescribed and triager unable to answer question    Additional Information   Negative: Intentional drug overdose and suicidal thoughts or ideas   Negative: Drug overdose and triager unable to answer question   Negative: Caller requesting a renewal or refill of a medicine patient is currently taking   Negative: Caller requesting information unrelated to medicine   Negative: Caller requesting information about COVID-19 Vaccine   Negative: Caller requesting information about Emergency Contraception   Negative: Caller requesting information about Combined Birth Control Pills   Negative: Caller requesting information about Progestin Birth Control Pills   Negative: Caller requesting information about Post-Op pain or medicines   Negative: Caller requesting a prescription antibiotic (such as penicillin) for Strep throat and has a positive culture result   Negative: Caller requesting a prescription anti-viral med (such as Tamiflu) and has influenza (flu) symptoms   Negative: Immunization reaction suspected   Negative: Rash while taking a medicine or within 3 days of stopping it   Negative: Asthma and having symptoms of asthma (cough, wheezing, etc.)   Negative: Symptom of illness (e.g., headache, abdominal pain, earache, vomiting) that are more than mild    Negative: Breastfeeding questions about mother's medicines and diet   Negative: MORE THAN A DOUBLE DOSE of a prescription or over-the-counter (OTC) drug   Negative: DOUBLE DOSE (an extra dose or lesser amount) of prescription drug and any symptoms (e.g., dizziness, nausea, pain, sleepiness)   Negative: DOUBLE DOSE (an extra dose or lesser amount) of over-the-counter (OTC) drug and any symptoms (e.g., dizziness, nausea, pain, sleepiness)   Negative: Took another person's prescription drug   Negative: DOUBLE DOSE (an extra dose or lesser amount) of prescription drug and NO symptoms  (Exception: A double dose of antibiotics.)   Negative: Diabetes drug error or overdose (e.g., took wrong type of insulin or took extra dose)   Negative: Caller has medication question about med NOT prescribed by PCP and triager unable to answer question (e.g., compatibility with other med, storage)   Negative: Prescription not at pharmacy and was prescribed by PCP recently  (Exception: triager has access to EMR and prescription is recorded there. Go to Home Care and confirm for pharmacy.)   Negative: Pharmacy calling with prescription question and triager unable to answer question    Protocols used: Medication Question Call-A-[place]

## 2024-04-19 LAB
BACTERIA BLD CULT: NO GROWTH
BACTERIA BLD CULT: NO GROWTH

## 2024-04-21 ENCOUNTER — ANCILLARY PROCEDURE (OUTPATIENT)
Dept: MRI IMAGING | Facility: CLINIC | Age: 69
End: 2024-04-21
Attending: PSYCHIATRY & NEUROLOGY
Payer: COMMERCIAL

## 2024-04-21 DIAGNOSIS — R41.3 MEMORY LOSS: ICD-10-CM

## 2024-04-21 PROCEDURE — A9585 GADOBUTROL INJECTION: HCPCS | Performed by: STUDENT IN AN ORGANIZED HEALTH CARE EDUCATION/TRAINING PROGRAM

## 2024-04-21 PROCEDURE — 70553 MRI BRAIN STEM W/O & W/DYE: CPT | Performed by: STUDENT IN AN ORGANIZED HEALTH CARE EDUCATION/TRAINING PROGRAM

## 2024-04-21 RX ORDER — GADOBUTROL 604.72 MG/ML
7.5 INJECTION INTRAVENOUS ONCE
OUTPATIENT
Start: 2024-04-21 | End: 2024-04-21

## 2024-04-21 RX ORDER — GADOBUTROL 604.72 MG/ML
10 INJECTION INTRAVENOUS ONCE
OUTPATIENT
Start: 2024-04-21 | End: 2024-04-21

## 2024-04-21 RX ORDER — GADOBUTROL 604.72 MG/ML
10 INJECTION INTRAVENOUS ONCE
Status: COMPLETED | OUTPATIENT
Start: 2024-04-21 | End: 2024-04-21

## 2024-04-21 RX ADMIN — GADOBUTROL 8.5 ML: 604.72 INJECTION INTRAVENOUS at 13:14

## 2024-04-23 DIAGNOSIS — E03.9 HYPOTHYROIDISM, UNSPECIFIED TYPE: Primary | ICD-10-CM

## 2024-04-25 RX ORDER — LEVOTHYROXINE SODIUM 150 UG/1
150 TABLET ORAL DAILY
Qty: 90 TABLET | Refills: 3 | Status: SHIPPED | OUTPATIENT
Start: 2024-04-25 | End: 2024-06-04

## 2024-04-25 NOTE — TELEPHONE ENCOUNTER
levothyroxine (SYNTHROID/LEVOTHROID) 150 MCG tablet 90 tablet 3 6/20/2023     Last Office Visit: 4/18/24  Future Office visit:   5/22/24    TSH   Date Value Ref Range Status   04/13/2024 0.32 0.30 - 4.20 uIU/mL Final   07/27/2022 11.01 (H) 0.40 - 4.00 mU/L Final   03/24/2021 9.86 (H) 0.40 - 4.00 mU/L Final       Thyroid Protocol Passed          Meron Marin RN  [facility] Red Flag Triage/MRT

## 2024-04-30 LAB
ACID FAST STAIN (ARUP): NORMAL

## 2024-05-01 LAB
ACID FAST STAIN (ARUP): NORMAL
ACID FAST STAIN (ARUP): NORMAL

## 2024-05-02 ENCOUNTER — NURSE TRIAGE (OUTPATIENT)
Dept: FAMILY MEDICINE | Facility: CLINIC | Age: 69
End: 2024-05-02
Payer: COMMERCIAL

## 2024-05-02 ENCOUNTER — APPOINTMENT (OUTPATIENT)
Dept: GENERAL RADIOLOGY | Facility: CLINIC | Age: 69
End: 2024-05-02
Attending: EMERGENCY MEDICINE
Payer: COMMERCIAL

## 2024-05-02 ENCOUNTER — TELEPHONE (OUTPATIENT)
Dept: INFECTIOUS DISEASES | Facility: CLINIC | Age: 69
End: 2024-05-02
Payer: COMMERCIAL

## 2024-05-02 ENCOUNTER — HOSPITAL ENCOUNTER (EMERGENCY)
Facility: CLINIC | Age: 69
Discharge: HOME | End: 2024-05-02
Attending: EMERGENCY MEDICINE | Admitting: EMERGENCY MEDICINE
Payer: COMMERCIAL

## 2024-05-02 VITALS
RESPIRATION RATE: 18 BRPM | SYSTOLIC BLOOD PRESSURE: 145 MMHG | HEART RATE: 68 BPM | DIASTOLIC BLOOD PRESSURE: 95 MMHG | TEMPERATURE: 98 F | OXYGEN SATURATION: 97 %

## 2024-05-02 DIAGNOSIS — R07.9 ACUTE CHEST PAIN: ICD-10-CM

## 2024-05-02 DIAGNOSIS — J18.9 PNEUMONIA OF BOTH LUNGS DUE TO INFECTIOUS ORGANISM, UNSPECIFIED PART OF LUNG: ICD-10-CM

## 2024-05-02 DIAGNOSIS — R06.00 DYSPNEA, UNSPECIFIED TYPE: ICD-10-CM

## 2024-05-02 LAB
ACID FAST STAIN (ARUP): NORMAL
ALBUMIN SERPL BCG-MCNC: 3.4 G/DL (ref 3.5–5.2)
ALP SERPL-CCNC: 209 U/L (ref 40–150)
ALT SERPL W P-5'-P-CCNC: 18 U/L (ref 0–70)
ANION GAP SERPL CALCULATED.3IONS-SCNC: 10 MMOL/L (ref 7–15)
AST SERPL W P-5'-P-CCNC: 15 U/L (ref 0–45)
ATRIAL RATE - MUSE: 267 BPM
BASOPHILS # BLD AUTO: 0 10E3/UL (ref 0–0.2)
BASOPHILS NFR BLD AUTO: 1 %
BILIRUB SERPL-MCNC: 0.6 MG/DL
BUN SERPL-MCNC: 17.2 MG/DL (ref 8–23)
CALCIUM SERPL-MCNC: 8.7 MG/DL (ref 8.8–10.2)
CHLORIDE SERPL-SCNC: 102 MMOL/L (ref 98–107)
CREAT SERPL-MCNC: 0.97 MG/DL (ref 0.67–1.17)
DEPRECATED HCO3 PLAS-SCNC: 23 MMOL/L (ref 22–29)
DIASTOLIC BLOOD PRESSURE - MUSE: NORMAL MMHG
EGFRCR SERPLBLD CKD-EPI 2021: 84 ML/MIN/1.73M2
EOSINOPHIL # BLD AUTO: 0 10E3/UL (ref 0–0.7)
EOSINOPHIL NFR BLD AUTO: 1 %
ERYTHROCYTE [DISTWIDTH] IN BLOOD BY AUTOMATED COUNT: 14 % (ref 10–15)
GLUCOSE SERPL-MCNC: 404 MG/DL (ref 70–99)
HCT VFR BLD AUTO: 37.4 % (ref 40–53)
HGB BLD-MCNC: 12 G/DL (ref 13.3–17.7)
HOLD SPECIMEN: NORMAL
IMM GRANULOCYTES # BLD: 0 10E3/UL
IMM GRANULOCYTES NFR BLD: 1 %
INTERPRETATION ECG - MUSE: NORMAL
LYMPHOCYTES # BLD AUTO: 1.1 10E3/UL (ref 0.8–5.3)
LYMPHOCYTES NFR BLD AUTO: 16 %
MAGNESIUM SERPL-MCNC: 1.7 MG/DL (ref 1.7–2.3)
MCH RBC QN AUTO: 28.1 PG (ref 26.5–33)
MCHC RBC AUTO-ENTMCNC: 32.1 G/DL (ref 31.5–36.5)
MCV RBC AUTO: 88 FL (ref 78–100)
MONOCYTES # BLD AUTO: 0.3 10E3/UL (ref 0–1.3)
MONOCYTES NFR BLD AUTO: 4 %
NEUTROPHILS # BLD AUTO: 5.2 10E3/UL (ref 1.6–8.3)
NEUTROPHILS NFR BLD AUTO: 77 %
NRBC # BLD AUTO: 0 10E3/UL
NRBC BLD AUTO-RTO: 0 /100
NT-PROBNP SERPL-MCNC: 8699 PG/ML (ref 0–900)
P AXIS - MUSE: NORMAL DEGREES
PLATELET # BLD AUTO: 287 10E3/UL (ref 150–450)
POTASSIUM SERPL-SCNC: 5.1 MMOL/L (ref 3.4–5.3)
PR INTERVAL - MUSE: NORMAL MS
PROT SERPL-MCNC: 6.6 G/DL (ref 6.4–8.3)
QRS DURATION - MUSE: 84 MS
QT - MUSE: 382 MS
QTC - MUSE: 464 MS
R AXIS - MUSE: -8 DEGREES
RBC # BLD AUTO: 4.27 10E6/UL (ref 4.4–5.9)
SODIUM SERPL-SCNC: 135 MMOL/L (ref 135–145)
SYSTOLIC BLOOD PRESSURE - MUSE: NORMAL MMHG
T AXIS - MUSE: 140 DEGREES
T4 FREE SERPL-MCNC: 0.96 NG/DL (ref 0.9–1.7)
TROPONIN T SERPL HS-MCNC: 22 NG/L
TSH SERPL DL<=0.005 MIU/L-ACNC: 7.75 UIU/ML (ref 0.3–4.2)
VENTRICULAR RATE- MUSE: 89 BPM
WBC # BLD AUTO: 6.6 10E3/UL (ref 4–11)

## 2024-05-02 PROCEDURE — 36415 COLL VENOUS BLD VENIPUNCTURE: CPT | Performed by: EMERGENCY MEDICINE

## 2024-05-02 PROCEDURE — 84439 ASSAY OF FREE THYROXINE: CPT | Performed by: EMERGENCY MEDICINE

## 2024-05-02 PROCEDURE — 84443 ASSAY THYROID STIM HORMONE: CPT | Performed by: EMERGENCY MEDICINE

## 2024-05-02 PROCEDURE — 71046 X-RAY EXAM CHEST 2 VIEWS: CPT

## 2024-05-02 PROCEDURE — 99285 EMERGENCY DEPT VISIT HI MDM: CPT | Mod: 25 | Performed by: EMERGENCY MEDICINE

## 2024-05-02 PROCEDURE — 83735 ASSAY OF MAGNESIUM: CPT | Performed by: EMERGENCY MEDICINE

## 2024-05-02 PROCEDURE — 71046 X-RAY EXAM CHEST 2 VIEWS: CPT | Mod: 26 | Performed by: RADIOLOGY

## 2024-05-02 PROCEDURE — 83880 ASSAY OF NATRIURETIC PEPTIDE: CPT | Performed by: EMERGENCY MEDICINE

## 2024-05-02 PROCEDURE — 96374 THER/PROPH/DIAG INJ IV PUSH: CPT | Performed by: EMERGENCY MEDICINE

## 2024-05-02 PROCEDURE — 93005 ELECTROCARDIOGRAM TRACING: CPT | Performed by: EMERGENCY MEDICINE

## 2024-05-02 PROCEDURE — 82040 ASSAY OF SERUM ALBUMIN: CPT | Performed by: EMERGENCY MEDICINE

## 2024-05-02 PROCEDURE — 84484 ASSAY OF TROPONIN QUANT: CPT | Performed by: EMERGENCY MEDICINE

## 2024-05-02 PROCEDURE — 93010 ELECTROCARDIOGRAM REPORT: CPT | Performed by: EMERGENCY MEDICINE

## 2024-05-02 PROCEDURE — 85025 COMPLETE CBC W/AUTO DIFF WBC: CPT | Performed by: EMERGENCY MEDICINE

## 2024-05-02 RX ORDER — URSODIOL 250 MG/1
250 TABLET, FILM COATED ORAL 2 TIMES DAILY
Qty: 180 TABLET | Refills: 3 | Status: SHIPPED | OUTPATIENT
Start: 2024-05-02 | End: 2024-06-04

## 2024-05-02 RX ORDER — FUROSEMIDE 10 MG/ML
40 INJECTION INTRAMUSCULAR; INTRAVENOUS ONCE
Status: DISCONTINUED | OUTPATIENT
Start: 2024-05-02 | End: 2024-05-02 | Stop reason: HOSPADM

## 2024-05-02 RX ORDER — DOXYCYCLINE 100 MG/1
100 CAPSULE ORAL 2 TIMES DAILY
Qty: 10 CAPSULE | Refills: 0 | Status: SHIPPED | OUTPATIENT
Start: 2024-05-02 | End: 2024-05-07

## 2024-05-02 RX ORDER — DOXYCYCLINE 100 MG/1
100 CAPSULE ORAL ONCE
Status: DISCONTINUED | OUTPATIENT
Start: 2024-05-02 | End: 2024-05-02 | Stop reason: HOSPADM

## 2024-05-02 ASSESSMENT — ACTIVITIES OF DAILY LIVING (ADL): ADLS_ACUITY_SCORE: 37

## 2024-05-02 NOTE — TELEPHONE ENCOUNTER
Caller reporting the following red-flag symptom(s): Per patients daughter Kaylene, had pneumonia vaccine now has pressure in chest and shortness of breath and tightness in his chest.     Per the system red-flag symptom policy, patient was instructed to:  speak with a Registered Nurse    Action:  Patient warm transferred to a Registered Nurse

## 2024-05-02 NOTE — ED TRIAGE NOTES
Arrives ambulatory with chest pressure and SOB. Per patient it started three days ago. Per patient he received a IM medication from the pharmacy and doesn't know the name of it. Since getting the injection his symptoms has persisted.     Triage Assessment (Adult)       Row Name 05/02/24 1301          Triage Assessment    Airway WDL WDL        Respiratory WDL    Respiratory WDL rhythm/pattern     Rhythm/Pattern, Respiratory shortness of breath        Skin Circulation/Temperature WDL    Skin Circulation/Temperature WDL WDL        Cardiac WDL    Cardiac WDL X;chest pain        Chest Pain Assessment    Chest Pain Location midsternal     Chest Pain Intervention 12-lead ECG obtained        Peripheral/Neurovascular WDL    Peripheral Neurovascular WDL WDL        Cognitive/Neuro/Behavioral WDL    Cognitive/Neuro/Behavioral WDL WDL

## 2024-05-02 NOTE — TELEPHONE ENCOUNTER
"68 year old received pneumonia vaccine from his local [facility] 3 days ago and has been feeling symptomatic since  The 1st day he had trouble sleeping and felt chest tightness   The chest tightness has continued   He then began feeling short of breath with activity and that his increased today.  He also has a burning sensation in his throat today  Over all not feeling well        Advised daughter to take to ER          Reason for Disposition   Patient sounds very sick or weak to the triager    Additional Information   Negative: SEVERE difficulty breathing (e.g., struggling for each breath, speaks in single words, pulse > 120)   Negative: Breathing stopped and hasn't returned   Negative: Choking on something   Negative: Bluish (or gray) lips or face   Negative: Difficult to awaken or acting confused (e.g., disoriented, slurred speech)   Negative: Passed out (i.e., fainted, collapsed and was not responding)   Negative: Wheezing started suddenly after medicine, an allergic food, or bee sting   Negative: Stridor (harsh sound while breathing in)   Negative: Slow, shallow and weak breathing   Negative: Sounds like a life-threatening emergency to the triager   Negative: Chest pain   Negative: Wheezing (high pitched whistling sound) and previous asthma attacks or use of asthma medicines   Negative: Difficulty breathing and within 14 days of COVID-19 Exposure   Negative: Difficulty breathing and only present when coughing   Negative: Difficulty breathing and only from stuffy nose   Negative: Difficulty breathing and only from stuffy nose or runny nose from common cold    Answer Assessment - Initial Assessment Questions  1. RESPIRATORY STATUS: \"Describe your breathing?\" (e.g., wheezing, shortness of breath, unable to speak, severe coughing)       Short of breath with activity  2. ONSET: \"When did this breathing problem begin?\"       Since receiving pneumonia vaccing  3. PATTERN \"Does the difficult breathing come and go, or " "has it been constant since it started?\"       Been getting worse day to day  4. SEVERITY: \"How bad is your breathing?\" (e.g., mild, moderate, severe)     - MILD: No SOB at rest, mild SOB with walking, speaks normally in sentences, can lie down, no retractions, pulse < 100.     - MODERATE: SOB at rest, SOB with minimal exertion and prefers to sit, cannot lie down flat, speaks in phrases, mild retractions, audible wheezing, pulse 100-120.     - SEVERE: Very SOB at rest, speaks in single words, struggling to breathe, sitting hunched forward, retractions, pulse > 120       Mild to moderate  5. RECURRENT SYMPTOM: \"Have you had difficulty breathing before?\" If Yes, ask: \"When was the last time?\" and \"What happened that time?\"       Yes with pneumonia  6. CARDIAC HISTORY: \"Do you have any history of heart disease?\" (e.g., heart attack, angina, bypass surgery, angioplasty)       -  7. LUNG HISTORY: \"Do you have any history of lung disease?\"  (e.g., pulmonary embolus, asthma, emphysema)      Yes pneumonia  8. CAUSE: \"What do you think is causing the breathing problem?\"       Had vaccine 3 days ago   9. OTHER SYMPTOMS: \"Do you have any other symptoms? (e.g., dizziness, runny nose, cough, chest pain, fever)      Check tightness  10. O2 SATURATION MONITOR:  \"Do you use an oxygen saturation monitor (pulse oximeter) at home?\" If Yes, ask: \"What is your reading (oxygen level) today?\" \"What is your usual oxygen saturation reading?\" (e.g., 95%)        no  11. PREGNANCY: \"Is there any chance you are pregnant?\" \"When was your last menstrual period?\"        no  12. TRAVEL: \"Have you traveled out of the country in the last month?\" (e.g., travel history, exposures)        no    Protocols used: Breathing Difficulty-A-OH    "

## 2024-05-02 NOTE — DISCHARGE INSTRUCTIONS
TODAY'S VISIT:  You were seen today for chest pain   -   - If you had any labs or imaging/radiology tests performed today, you should also discuss these tests with your usual provider.     FOLLOW-UP:  Please make an appointment to follow up with:  - Your Primary Care Provider. If you do not have a PCP, please call the Primary Care Center (phone: [phone] for an appointment    - Have your provider review the results from today's visit with you again to make sure no further follow-up or additional testing is needed based on those results.     RETURN TO THE EMERGENCY DEPARTMENT  Return to the Emergency Department at any time for any new or worsening symptoms or any concerns.

## 2024-05-02 NOTE — ED PROVIDER NOTES
"  History     Chief Complaint   Patient presents with    Shortness of Breath     HPI  Loy Limon is a 68 year old male with a past medical history of  liver transplant (on immunosuppression), atrial fibrillation, CKD, hypothyroidism, type II diabetes, hypertension, prostate cancer who presents to the emergency department with a chief complaint of chest pressure.  Associated with shortness of breath.  It started 3 days ago.  The patient states he received an IM medication from the pharmacy, but does not know what medication this was.  He states he was told it was an injection for \"people over 60 to help them breathe better.\"  However, he feels like after he got this injection, his chest pain and shortness of breath started.  His symptoms have persisted since then.  Per MII C, the patient received an RSV vaccine on 4/23/2024.  The patient denies any leg pain or increased swelling (he reports that his legs are swollen at baseline).    Per chart review, the patient was recently admitted from 4/12 through 4/17 to internal medicine for gram-negative sepsis.  Patient also had NSTEMI due to sepsis/uncontrolled atrial fibrillation.  He had heart rate up to 160s while hospitalized.    I have reviewed the Medications, Allergies, Past Medical and Surgical History, and Social History in the Epic system.    ECHO 4/13  Interpretation Summary  Global and regional left ventricular function is normal with an EF of 55-60%.  Right ventricular function, chamber size, wall motion, and thickness are  normal.  Severe left atrial enlargement is present.  The inferior vena cava is normal.  No pericardial effusion is present.  There is no prior study for direct comparison.    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/28/2018    BPH (benign prostatic hyperplasia)     Cancer (H)     hepatocellualr carcinoma    Cirrhosis of liver (H) 05/08/2018    Diabetes (H)     Enlarged prostate "     Hypothyroidism     Impotence of organic origin 2020    Inguinal hernia     Repaired with mesh on 18    Liver lesion 2018    Liver transplanted (H) 2018     donor liver transplant    Portal vein thrombosis     on path explant    Postoperative atrial fibrillation (H) 2018    Prostate cancer (H)     TB lung, latent     Treated      Past Surgical History:   Procedure Laterality Date    APPENDECTOMY      BRONCHOSCOPY (RIGID OR FLEXIBLE), DIAGNOSTIC N/A 2024    Procedure: BRONCHOSCOPY, WITH BRONCHOALVEOLAR LAVAGE;  Surgeon: Jimmy Farley MD;  Location:  GI    COLONOSCOPY          CV CORONARY ANGIOGRAM N/A 10/13/2021    Procedure: CV CORONARY ANGIOGRAM;  Surgeon: Yaya Hampton MD;  Location:  HE[facility] CATH [facility]    CV CORONARY LITHOTRIPSY PCI N/A 10/13/2021    Procedure: CV Coronary Lithotripsy PCI;  Surgeon: Yaya Hampton MD;  Location:  [facility]ATH [facility]    CV INSTANTANEOUS WAVE-FREE RATIO N/A 10/13/2021    Procedure: Instantaneous Wave-Free Ratio;  Surgeon: Yaya Hampton MD;  Location:  [facility] CATH [facility]    CV INTRAVASULAR ULTRASOUND N/A 10/13/2021    Procedure: Intravascular Ultrasound;  Surgeon: Yaya Hampton MD;  Location:  [facility] CATH [facility]    CV PCI STENT DRUG ELUTING N/A 10/13/2021    Procedure: Percutaneous Coronary Intervention Stent Drug Eluting;  Surgeon: Yaya Hampton MD;  Location:  HE[facility] CATH LAB    DAVINCI PROSTATECTOMY N/A 2020    Procedure: Robot-assisted laparoscopic radical prostatectomy, Bladder biopsy;  Surgeon: Kenrick Casiano MD;  Location: [facility]    ENDOSCOPIC RETROGRADE CHOLANGIOPANCREATOGRAM N/A 2018    Procedure: ENDOSCOPIC RETROGRADE CHOLANGIOPANCREATOGRAM, with Billary Sphincterotomy and Billary Stent Placement;  Surgeon: Omero Lawler MD;  Location: [facility]    ENDOSCOPIC RETROGRADE  "CHOLANGIOPANCREATOGRAM  2019    Procedure: COMBINED ENDOSCOPIC RETROGRADE CHOLANGIOPANCREATOGRAPHY, Bile Duct Stent Exchange;  Surgeon: Omero Lawler MD;  Location: [facility]    ENDOSCOPIC RETROGRADE CHOLANGIOPANCREATOGRAM N/A 2019    Procedure: ENDOSCOPIC RETROGRADE CHOLANGIOPANCREATOGRAPHY, WITH BILIARY STENT REMOVAL AND STONE EXTRACTION;  Surgeon: Omero Lawler MD;  Location: [facility]    HERNIORRHAPHY INGUINAL  2018    Procedure: Herniorrhaphy inguinal;  Surgeon: Emil Echevarria MD;  Location: [facility]    IR LIVER BIOPSY PERCUTANEOUS  2018    LAPAROTOMY EXPLORATORY      per the patient \"for infection in the LLQ\"     PICC INSERTION Right 2024    47 cm basilic    TRANSPLANT LIVER RECIPIENT  DONOR N/A 2018    Procedure: TRANSPLANT LIVER RECIPIENT  DONOR;  Surgeon: Emil Echevarria MD;  Location: [facility]     No current facility-administered medications for this encounter.     Current Outpatient Medications   Medication Sig Dispense Refill    Alcohol Swabs PADS Use to swab the area of the injection or fam as directed Per insurance coverage 200 each 1    apixaban ANTICOAGULANT (ELIQUIS) 5 MG tablet Take 1 tablet (5 mg) by mouth 2 times daily 60 tablet 1    aspirin (ASA) 81 MG chewable tablet Take 1 tablet (81 mg) by mouth daily for 360 days 90 tablet 3    atorvastatin (LIPITOR) 40 MG tablet Take 40 mg by mouth daily      atorvastatin (LIPITOR) 40 MG tablet Take 1 tablet (40 mg) by mouth daily 90 tablet 3    blood glucose (NO BRAND SPECIFIED) lancets standard To use to test glucose level in the blood Use to test blood sugar  3  times daily as directed. To accompany glucose monitor brands per insurance coverage. 200 each 1    blood glucose (NO BRAND SPECIFIED) lancets standard Use to test blood sugar 2 times daily or as directed. 50 lancet 3    blood glucose (NO BRAND SPECIFIED) test strip To use to test glucose level in the blood Use to test blood " sugar  3 times daily as directed. To accompany glucose monitor brands per insurance coverage. 100 strip 3    blood glucose (NO BRAND SPECIFIED) test strip Use to test blood sugar 2 times daily or as directed. One touch ultra test strips 50 strip 5    blood glucose (ONE TOUCH SURESOFT) lancing device Lancing device to be used with lancets. 1 each 0    blood glucose monitoring (NO BRAND SPECIFIED) meter device kit Use as directed Per insurance coverage 1 kit 0    blood glucose monitoring (ONE TOUCH ULTRA 2) meter device kit Use to test blood sugar 2 times daily or as directed. 1 kit 0    blood glucose monitoring (ONE TOUCH ULTRASOFT) lancets Use to test blood sugar 2 times daily. 100 each 6    diltiazem ER COATED BEADS (CARDIZEM CD/CARTIA XT) 120 MG 24 hr capsule Take 1 capsule (120 mg) by mouth daily 30 capsule 1    hydrALAZINE (APRESOLINE) 25 MG tablet Take 1 tablet (25 mg) by mouth 2 times daily 60 tablet 1    hydrALAZINE (APRESOLINE) 25 MG tablet Take 1 tablet (25 mg) by mouth 2 times daily 180 tablet 3    insulin glargine (LANTUS PEN) 100 UNIT/ML pen Inject 28 units subcutaneous at hs. 20 mL 3    insulin glargine (LANTUS SOLOSTAR) 100 UNIT/ML pen Inject 20 Units Subcutaneous at bedtime 15 mL 1    insulin pen needle (31G X 5 MM) 31G X 5 MM miscellaneous Use one  pen needles daily or as directed. 100 each 3    levofloxacin (LEVAQUIN) 500 MG tablet Take 1 tablet (500 mg) by mouth daily 3 tablet 0    levothyroxine (SYNTHROID/LEVOTHROID) 150 MCG tablet Take 1 tablet (150 mcg) by mouth daily 90 tablet 3    levothyroxine (SYNTHROID/LEVOTHROID) 150 MCG tablet Take 1 tablet (150 mcg) by mouth daily 90 tablet 3    metFORMIN (GLUCOPHAGE XR) 500 MG 24 hr tablet Take 1,000 mg by mouth 2 times daily (with meals)      metFORMIN (GLUCOPHAGE XR) 500 MG 24 hr tablet Take 2 tablets (1,000 mg) by mouth 2 times daily (with meals) 360 tablet 3    methocarbamol (ROBAXIN) 500 MG tablet Take 500 mg by mouth 4 times daily as needed for  muscle spasms      mycophenolate (GENERIC EQUIVALENT) 250 MG capsule Take 2 capsules (500 mg) by mouth 2 times daily for 90 days 90 capsule 3    mycophenolate (GENERIC EQUIVALENT) 500 MG tablet Take 500 mg by mouth 2 times daily      predniSONE (DELTASONE) 5 MG tablet Take 5 mg by mouth daily      predniSONE (DELTASONE) 5 MG tablet Take 1 tablet (5 mg) by mouth daily 90 tablet 1    sulfamethoxazole-trimethoprim (BACTRIM DS) 800-160 MG tablet Take 1 tablet by mouth 2 times daily 14 tablet 0    ursodiol (ACTIGALL) 250 MG tablet Take 250 mg by mouth 2 times daily      ursodiol (ACTIGALL) 250 MG tablet Take 1 tablet (250 mg) by mouth 2 times daily 180 tablet 3    Vitamin D3 (CHOLECALCIFEROL) 25 mcg (1000 units) tablet Take 2 tablets (50 mcg) by mouth daily 180 tablet 3    VITAMIN D3 25 MCG (1000 UT) tablet Take 2 tablets by mouth daily at 2 pm       No Known Allergies  Past medical history, past surgical history, medications, and allergies were reviewed with the patient. Additional pertinent items: None    Social History     Socioeconomic History    Marital status:      Spouse name: Not on file    Number of children: Not on file    Years of education: Not on file    Highest education level: Not on file   Occupational History    Not on file   Tobacco Use    Smoking status: Former     Current packs/day: 0.00     Average packs/day: (1.4 ttl pk-yrs)     Types: Cigarettes, Cigars     Start date: 1970     Quit date: 3/14/2018     Years since quittin.1    Smokeless tobacco: Never    Tobacco comments:     Quit smoking 2018, one cigarette after dinner   Vaping Use    Vaping status: Never Used   Substance and Sexual Activity    Alcohol use: No     Comment: Quit drinking 2018    Drug use: No    Sexual activity: Not on file   Other Topics Concern    Parent/sibling w/ CABG, MI or angioplasty before 65F 55M? Not Asked   Social History Narrative    Born in [place], came to [place] years ago.     Has worked in  manufacturing of cardboard, trimming meats     Social Determinants of Health     Financial Resource Strain: Not on file   Food Insecurity: Not on file   Transportation Needs: Not on file   Physical Activity: Not on file   Stress: Not on file   Social Connections: Not on file   Interpersonal Safety: Low Risk  (3/20/2024)    Interpersonal Safety     Do you feel physically and emotionally safe where you currently live?: Yes     Within the past 12 months, have you been hit, slapped, kicked or otherwise physically hurt by someone?: No     Within the past 12 months, have you been humiliated or emotionally abused in other ways by your partner or ex-partner?: No   Housing Stability: Not on file     Social history was reviewed with the patient. Additional pertinent items: None    Review of Systems  A medically appropriate review of systems was performed with pertinent positives and negatives noted in the HPI, and all other systems negative.    Physical Exam   Pulse: 84  Temp: 98  F (36.7  C)  Resp: 20  SpO2: 94 %      General: Well nourished, well developed, NAD  HEENT: EOMI, anicteric. NCAT, MMM  Neck: no jugular venous distension, supple, nl ROM  Cardiac: Regular rate and rhythm. No murmurs, rubs, or gallops. Normal S1, S2.  Intact peripheral pulses  Pulm: CTAB, no stridor, wheezes, rales, rhonchi  Abd: Soft, nontender, nondistended.  No masses palpated.    Skin: Warm and dry to the touch.  No rash  Extremities: 1+ pitting bilateral LE edema, no cyanosis, w/w/p  Neuro: A&Ox3, no gross focal deficits    ED Course        Procedures                    EKG Interpretation:      Interpreted by Sandra Celis MD  Time reviewed: 1304  Symptoms at time of EKG: Chest pain, shortness of breath  Rhythm: Atrial flutter with variable transmission  Rate: normal, 89 bpm  LVH  Axis: normal  Ectopy: none  Conduction: Prolonged QT  ST Segments/ T Waves: Lateral T wave inversions, new compared to prior EKG  Q Waves: none  Comparison to  prior: Compared to previous EKG, dated February 28, 2024, rapid ventricular rate has resolved, however, T wave inversions are new    Clinical Impression: normal EKG            Labs Ordered and Resulted from Time of ED Arrival to Time of ED Departure   COMPREHENSIVE METABOLIC PANEL - Abnormal       Result Value    Sodium 135      Potassium 5.1      Carbon Dioxide (CO2) 23      Anion Gap 10      Urea Nitrogen 17.2      Creatinine 0.97      GFR Estimate 84      Calcium 8.7 (*)     Chloride 102      Glucose 404 (*)     Alkaline Phosphatase 209 (*)     AST 15      ALT 18      Protein Total 6.6      Albumin 3.4 (*)     Bilirubin Total 0.6     TSH WITH FREE T4 REFLEX - Abnormal    TSH 7.75 (*)    NT PROBNP INPATIENT - Abnormal    N terminal Pro BNP Inpatient 8,699 (*)    CBC WITH PLATELETS AND DIFFERENTIAL - Abnormal    WBC Count 6.6      RBC Count 4.27 (*)     Hemoglobin 12.0 (*)     Hematocrit 37.4 (*)     MCV 88      MCH 28.1      MCHC 32.1      RDW 14.0      Platelet Count 287      % Neutrophils 77      % Lymphocytes 16      % Monocytes 4      % Eosinophils 1      % Basophils 1      % Immature Granulocytes 1      NRBCs per 100 WBC 0      Absolute Neutrophils 5.2      Absolute Lymphocytes 1.1      Absolute Monocytes 0.3      Absolute Eosinophils 0.0      Absolute Basophils 0.0      Absolute Immature Granulocytes 0.0      Absolute NRBCs 0.0     TROPONIN T, HIGH SENSITIVITY - Normal    Troponin T, High Sensitivity 22     MAGNESIUM - Normal    Magnesium 1.7     T4 FREE - Normal    Free T4 0.96              Results for orders placed or performed during the hospital encounter of 05/02/24 (from the past 24 hour(s))   EKG 12 lead   Result Value Ref Range    Systolic Blood Pressure  mmHg    Diastolic Blood Pressure  mmHg    Ventricular Rate 89 BPM    Atrial Rate 267 BPM    FL Interval  ms    QRS Duration 84 ms     ms    QTc 464 ms    P Axis  degrees    R AXIS -8 degrees    T Axis 140 degrees    Interpretation ECG        Atrial fibrillation  Voltage criteria for left ventricular hypertrophy  ST & T wave abnormality, consider lateral ischemia  Prolonged QT  Abnormal ECG  Unconfirmed report - interpretation of this ECG is computer generated - see medical record for final interpretation  Confirmed by - EMERGENCY ROOM, PHYSICIAN ([id number]),  SLOAN LEMUS ([id number]) on 5/2/2024 4:27:14 PM     CBC with platelets differential    Narrative    The following orders were created for panel order CBC with platelets differential.  Procedure                               Abnormality         Status                     ---------                               -----------         ------                     CBC with platelets and d...[[id number]]  Abnormal            Final result                 Please view results for these tests on the individual orders.   Comprehensive metabolic panel   Result Value Ref Range    Sodium 135 135 - 145 mmol/L    Potassium 5.1 3.4 - 5.3 mmol/L    Carbon Dioxide (CO2) 23 22 - 29 mmol/L    Anion Gap 10 7 - 15 mmol/L    Urea Nitrogen 17.2 8.0 - 23.0 mg/dL    Creatinine 0.97 0.67 - 1.17 mg/dL    GFR Estimate 84 >60 mL/min/1.73m2    Calcium 8.7 (L) 8.8 - 10.2 mg/dL    Chloride 102 98 - 107 mmol/L    Glucose 404 (H) 70 - 99 mg/dL    Alkaline Phosphatase 209 (H) 40 - 150 U/L    AST 15 0 - 45 U/L    ALT 18 0 - 70 U/L    Protein Total 6.6 6.4 - 8.3 g/dL    Albumin 3.4 (L) 3.5 - 5.2 g/dL    Bilirubin Total 0.6 <=1.2 mg/dL   Troponin T, High Sensitivity   Result Value Ref Range    Troponin T, High Sensitivity 22 <=22 ng/L   Magnesium   Result Value Ref Range    Magnesium 1.7 1.7 - 2.3 mg/dL   TSH with free T4 reflex   Result Value Ref Range    TSH 7.75 (H) 0.30 - 4.20 uIU/mL   Nt probnp inpatient (BNP)   Result Value Ref Range    N terminal Pro BNP Inpatient 8,699 (H) 0 - 900 pg/mL   [place] Draw    Narrative    The following orders were created for panel order [place] Draw.  Procedure                               Abnormality          Status                     ---------                               -----------         ------                     Extra Blue Top Tube[[id number]]                              Final result                 Please view results for these tests on the individual orders.   CBC with platelets and differential   Result Value Ref Range    WBC Count 6.6 4.0 - 11.0 10e3/uL    RBC Count 4.27 (L) 4.40 - 5.90 10e6/uL    Hemoglobin 12.0 (L) 13.3 - 17.7 g/dL    Hematocrit 37.4 (L) 40.0 - 53.0 %    MCV 88 78 - 100 fL    MCH 28.1 26.5 - 33.0 pg    MCHC 32.1 31.5 - 36.5 g/dL    RDW 14.0 10.0 - 15.0 %    Platelet Count 287 150 - 450 10e3/uL    % Neutrophils 77 %    % Lymphocytes 16 %    % Monocytes 4 %    % Eosinophils 1 %    % Basophils 1 %    % Immature Granulocytes 1 %    NRBCs per 100 WBC 0 <1 /100    Absolute Neutrophils 5.2 1.6 - 8.3 10e3/uL    Absolute Lymphocytes 1.1 0.8 - 5.3 10e3/uL    Absolute Monocytes 0.3 0.0 - 1.3 10e3/uL    Absolute Eosinophils 0.0 0.0 - 0.7 10e3/uL    Absolute Basophils 0.0 0.0 - 0.2 10e3/uL    Absolute Immature Granulocytes 0.0 <=0.4 10e3/uL    Absolute NRBCs 0.0 10e3/uL   Extra Blue Top Tube   Result Value Ref Range    Hold Specimen JIC    T4 free   Result Value Ref Range    Free T4 0.96 0.90 - 1.70 ng/dL   XR Chest 2 Views    Narrative    Exam: XR CHEST 2 VIEWS, 5/2/2024 2:52 PM    Indication: CP, SOB    Comparison: CT chest 4/14/2024, radiograph 4/12/2024    Findings:   Upright PA and lateral views of the chest. Cardiac monitor left upper  chest. Heart size is enlarged, similar to prior. Aortic arch  atherosclerotic calcifications. Increased left greater than right  perihilar hazy opacities. Bibasilar streaky opacities. Blunting of the  costophrenic angles. No appreciable pneumothorax.      Impression    Impression:   1.  Increased left greater than right perihilar opacities, which may  represent infection versus edema.  2.  Trace bilateral pleural effusions.    I have personally reviewed the  examination and initial interpretation  and I agree with the findings.    ZACK PATTERSON MD         SYSTEM ID:  [id number]     *Note: Due to a large number of results and/or encounters for the requested time period, some results have not been displayed. A complete set of results can be found in Results Review.       Labs, vital signs, and imaging studies were reviewed by me.    Medications   furosemide (LASIX) injection 40 mg (has no administration in time range)   doxycycline hyclate (VIBRAMYCIN) capsule 100 mg (has no administration in time range)       Assessments & Plan (with Medical Decision Making)   Loy Limon is a 68 year old male who presents to the emergency department chest pain shortness of breath.  Differential diagnosis includes ACS, pneumonia, bronchitis, cardiac arrhythmia, musculoskeletal chest wall pain, anxiety.  Labs, EKG, chest x-ray ordered to further evaluate the patient in the emergency department.    EKG shows atrial flutter, rate improved compared to previous, however, T wave inversions are present in the lateral leads which are new compared to his prior EKG    Laboratory workup was remarkable for elevated TSH, however T4 is within normal limits.  BNP is elevated at 8699, however, this is stable compared to 1 month ago and was 8913, magnesium is 1.7, troponin has normalized to 22 (this was elevated as high as 158 when it was checked 2 weeks ago during patient's admission/diagnosis of NSTEMI), CMP shows elevated glucose and alk phos, similar to prior.  CBC remarkable for normal white blood cell count and hemoglobin stable at 12.0 (this was 12.4 when it was checked 2 weeks ago).    Chest x-ray shows increased left greater than right perihilar opacities concerning for infection versus edema and trace bilateral pleural effusions.  Patient does have lower extremity edema on exam, however, his recent echocardiogram showed normal ejection fraction, will give dose of diuretics here and  plan to treat patient with antibiotics.  Patient has no known medication allergies.  The patient was recently treated with 7-day course of Levaquin starting on 4/17 and Bactrim on 3/22.  The patient is on hydralazine, Cardizem, no current diuretics at home.    Critical care was not performed.     Medical Decision Making  The patient's presentation was of high complexity (a chronic illness severe exacerbation, progression, or side effect of treatment).    The patient's evaluation involved:  review of external note(s) from 1 sources (notes from prior hospitalization)  review of 3+ test result(s) ordered prior to this encounter (see separate area of note for details)  ordering and/or review of 3+ test(s) in this encounter (see separate area of note for details)  independent interpretation of testing performed by another health professional (EKG, chest x-ray)  discussion of management or test interpretation with another health professional (see separate area of note for details)    The patient's management necessitated moderate risk (prescription drug management including medications given in the ED), moderate risk (limitations due to social determinants of health (non-English-speaking patient)), and high risk (a decision regarding hospitalization).    Chest x-ray images were personally reviewed by me, I agree with the radiology reads.    I have reviewed the nursing notes.    I have reviewed the findings, diagnosis, plan and need for follow up with the patient.    Patient to be discharged home. Advised to follow up with PCP within 1 week. To return to ER immediately with any new/worsening symptoms. Plan of care discussed with patient who expresses understanding and agrees with plan of care.    New Prescriptions    DOXYCYCLINE HYCLATE (VIBRAMYCIN) 100 MG CAPSULE    Take 1 capsule (100 mg) by mouth 2 times daily for 5 days       Final diagnoses:   Acute chest pain   Dyspnea, unspecified type   Pneumonia of both lungs due  to infectious organism, unspecified part of lung       AMANDA CELIS MD  5/2/2024   [facility] EMERGENCY DEPARTMENT       Amanda Celis MD  05/02/24 [id number]     No

## 2024-05-02 NOTE — TELEPHONE ENCOUNTER
"Patient Contacted for the patient to call back and schedule the following:    Appointment type: Changing visit to Video  Provider: Liborio  Return date: 5/7/2024  Specialty phone number: [phone]  Additional appointment(s) needed: na  Additonal Notes: Per Daniel: \"This will need to be switched to a day when she is in person or switched to virtual. He is ok for a virtual visit if he wishes or is able to do it virtual.\"    "

## 2024-05-14 PROCEDURE — 93248 EXT ECG>7D<15D REV&INTERPJ: CPT | Performed by: INTERNAL MEDICINE

## 2024-05-21 ENCOUNTER — LAB (OUTPATIENT)
Dept: LAB | Facility: CLINIC | Age: 69
End: 2024-05-21
Payer: COMMERCIAL

## 2024-05-21 ENCOUNTER — ANCILLARY PROCEDURE (OUTPATIENT)
Dept: MRI IMAGING | Facility: CLINIC | Age: 69
End: 2024-05-21
Attending: INTERNAL MEDICINE
Payer: COMMERCIAL

## 2024-05-21 DIAGNOSIS — C22.0 HCC (HEPATOCELLULAR CARCINOMA) (H): ICD-10-CM

## 2024-05-21 LAB
ALBUMIN SERPL BCG-MCNC: 3.5 G/DL (ref 3.5–5.2)
ALP SERPL-CCNC: 221 U/L (ref 40–150)
ALT SERPL W P-5'-P-CCNC: 24 U/L (ref 0–70)
ANION GAP SERPL CALCULATED.3IONS-SCNC: 8 MMOL/L (ref 7–15)
AST SERPL W P-5'-P-CCNC: 18 U/L (ref 0–45)
BASOPHILS # BLD AUTO: 0 10E3/UL (ref 0–0.2)
BASOPHILS NFR BLD AUTO: 0 %
BILIRUB SERPL-MCNC: 0.4 MG/DL
BUN SERPL-MCNC: 24.2 MG/DL (ref 8–23)
CALCIUM SERPL-MCNC: 8.7 MG/DL (ref 8.8–10.2)
CHLORIDE SERPL-SCNC: 105 MMOL/L (ref 98–107)
CREAT SERPL-MCNC: 1.26 MG/DL (ref 0.67–1.17)
DEPRECATED HCO3 PLAS-SCNC: 24 MMOL/L (ref 22–29)
EGFRCR SERPLBLD CKD-EPI 2021: 61 ML/MIN/1.73M2
EOSINOPHIL # BLD AUTO: 0 10E3/UL (ref 0–0.7)
EOSINOPHIL NFR BLD AUTO: 0 %
ERYTHROCYTE [DISTWIDTH] IN BLOOD BY AUTOMATED COUNT: 14 % (ref 10–15)
GLUCOSE SERPL-MCNC: 260 MG/DL (ref 70–99)
HCT VFR BLD AUTO: 37.9 % (ref 40–53)
HGB BLD-MCNC: 12.2 G/DL (ref 13.3–17.7)
IMM GRANULOCYTES # BLD: 0 10E3/UL
IMM GRANULOCYTES NFR BLD: 0 %
LYMPHOCYTES # BLD AUTO: 1.3 10E3/UL (ref 0.8–5.3)
LYMPHOCYTES NFR BLD AUTO: 17 %
MCH RBC QN AUTO: 27.4 PG (ref 26.5–33)
MCHC RBC AUTO-ENTMCNC: 32.2 G/DL (ref 31.5–36.5)
MCV RBC AUTO: 85 FL (ref 78–100)
MONOCYTES # BLD AUTO: 0.4 10E3/UL (ref 0–1.3)
MONOCYTES NFR BLD AUTO: 5 %
NEUTROPHILS # BLD AUTO: 5.7 10E3/UL (ref 1.6–8.3)
NEUTROPHILS NFR BLD AUTO: 78 %
NRBC # BLD AUTO: 0 10E3/UL
NRBC BLD AUTO-RTO: 0 /100
PLATELET # BLD AUTO: 285 10E3/UL (ref 150–450)
POTASSIUM SERPL-SCNC: 4.6 MMOL/L (ref 3.4–5.3)
PROT SERPL-MCNC: 6.6 G/DL (ref 6.4–8.3)
RBC # BLD AUTO: 4.45 10E6/UL (ref 4.4–5.9)
SODIUM SERPL-SCNC: 137 MMOL/L (ref 135–145)
WBC # BLD AUTO: 7.4 10E3/UL (ref 4–11)

## 2024-05-21 PROCEDURE — 99000 SPECIMEN HANDLING OFFICE-LAB: CPT | Performed by: PATHOLOGY

## 2024-05-21 PROCEDURE — 36415 COLL VENOUS BLD VENIPUNCTURE: CPT | Performed by: PATHOLOGY

## 2024-05-21 PROCEDURE — 85025 COMPLETE CBC W/AUTO DIFF WBC: CPT | Performed by: PATHOLOGY

## 2024-05-21 PROCEDURE — A9585 GADOBUTROL INJECTION: HCPCS | Performed by: RADIOLOGY

## 2024-05-21 PROCEDURE — 82105 ALPHA-FETOPROTEIN SERUM: CPT | Performed by: INTERNAL MEDICINE

## 2024-05-21 PROCEDURE — 74183 MRI ABD W/O CNTR FLWD CNTR: CPT | Performed by: RADIOLOGY

## 2024-05-21 PROCEDURE — 80053 COMPREHEN METABOLIC PANEL: CPT | Performed by: PATHOLOGY

## 2024-05-21 RX ORDER — GADOBUTROL 604.72 MG/ML
10 INJECTION INTRAVENOUS ONCE
Status: COMPLETED | OUTPATIENT
Start: 2024-05-21 | End: 2024-05-21

## 2024-05-21 RX ADMIN — GADOBUTROL 8.5 ML: 604.72 INJECTION INTRAVENOUS at 18:32

## 2024-05-22 ENCOUNTER — OFFICE VISIT (OUTPATIENT)
Dept: FAMILY MEDICINE | Facility: CLINIC | Age: 69
End: 2024-05-22
Payer: COMMERCIAL

## 2024-05-22 ENCOUNTER — TELEPHONE (OUTPATIENT)
Dept: TRANSPLANT | Facility: CLINIC | Age: 69
End: 2024-05-22

## 2024-05-22 VITALS
OXYGEN SATURATION: 96 % | WEIGHT: 196.5 LBS | SYSTOLIC BLOOD PRESSURE: 147 MMHG | DIASTOLIC BLOOD PRESSURE: 80 MMHG | HEART RATE: 90 BPM | BODY MASS INDEX: 29.1 KG/M2

## 2024-05-22 DIAGNOSIS — R05.9 COUGH, UNSPECIFIED TYPE: ICD-10-CM

## 2024-05-22 DIAGNOSIS — R06.00 DYSPNEA, UNSPECIFIED TYPE: ICD-10-CM

## 2024-05-22 DIAGNOSIS — Z94.4 LIVER TRANSPLANT RECIPIENT (H): Primary | ICD-10-CM

## 2024-05-22 DIAGNOSIS — R91.8 ABNORMAL FINDING ON LUNG IMAGING: Primary | ICD-10-CM

## 2024-05-22 LAB — AFP SERPL-MCNC: <1.8 NG/ML

## 2024-05-22 PROCEDURE — G2211 COMPLEX E/M VISIT ADD ON: HCPCS | Performed by: FAMILY MEDICINE

## 2024-05-22 PROCEDURE — 99214 OFFICE O/P EST MOD 30 MIN: CPT | Performed by: FAMILY MEDICINE

## 2024-05-22 RX ORDER — DOXYCYCLINE 100 MG/1
100 CAPSULE ORAL 2 TIMES DAILY
Qty: 20 CAPSULE | Refills: 0 | Status: SHIPPED | OUTPATIENT
Start: 2024-05-22

## 2024-05-22 NOTE — TELEPHONE ENCOUNTER
Alecia phos up a bit.  Did have ERCP post transplant, last 4/29/2019.  Claribel was a patient of Dr. Carballo's.  He hasn't been seen by hepatology since 3/23 at which time he was seen by Dr. Carballo.  I placed a request for an appt w/ Izabela Galvan.  I also called [facility] to remind him that he should see a liver doctor every year and have lab testing every 2 mos.  He does have multiple providers.  Follows w/ Dr. Melgar.  Had MRI for HCC follow-up on 5/21.  Result to Dr. Galvan.  Called Claribel w/ the assistance of a .   He asked about his recent MRI.  He is going to see his PCP tomorrow.  He still has shortness of breath (heart failure?).  This is not new.   Current immunosuppression:  pred and mycophenolate.   He has no concerns about his liver at this time.

## 2024-05-22 NOTE — PROGRESS NOTES
Assessment & Plan     Abnormal finding on lung imaging    - CT Chest w/o Contrast; Future    Dyspnea, unspecified type  See April 2024 Dr Welch inpt cardio consult  - Adult Cardiology Eval [facility] Referral; Future  - CT Chest w/o Contrast; Future    Cough, unspecified type    - CT Chest w/o Contrast; Future  - doxycycline hyclate (VIBRAMYCIN) 100 MG capsule; Take 1 capsule (100 mg) by mouth 2 times daily    35 minutes spent by me on the date of the encounter doing chart review, history and exam, documentation and further activities per the note  The longitudinal plan of care for the diagnosis(es)/condition(s) as documented were addressed during this visit. Due to the added complexity in care, I will continue to support Loy in the subsequent management and with ongoing continuity of care.    No follow-ups on file.    Teddy Granado is a 68 year old, presenting for the following health issues:  Follow Up        5/22/2024     1:38 PM   Additional Questions   Roomed by YW, EMT     HPI Here in follow-up. See recent ER visit.  1-cough, some yellow phlegm. Not a fequent cough.   No fever  Notes SOB: lying down, exertion. Not sitting.   Sees cardio but not till July; per April inpt cardio consult, pt to see general cardio w/in one month. Will try to arrange.  He notes from time in ER cough partly not fully improved, cough seemed to start improving when he started doxy, which has run out, tolerated    2-no new c/o since in ER    3-all done via ipad     4-no ENT sx or GI sx.    5-yesterday MR abd for liver disease notes possible left lung inflammation and suggests ct     Reason for visit: Shortness of breath and chest pain is not improving. Burning sensation. Pt requested a refill on doxycycline HYC.   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/28/2018    BPH (benign prostatic hyperplasia)     Cancer (H)     hepatocellualr carcinoma     Cirrhosis of liver (H) 2018    Diabetes (H)     Enlarged prostate     Hypothyroidism     Impotence of organic origin 2020    Inguinal hernia     Repaired with mesh on 18    Liver lesion 2018    Liver transplanted (H) 2018     donor liver transplant    Portal vein thrombosis     on path explant    Postoperative atrial fibrillation (H) 2018    Prostate cancer (H)     TB lung, latent     Treated      Past Surgical History:   Procedure Laterality Date    APPENDECTOMY      BRONCHOSCOPY (RIGID OR FLEXIBLE), DIAGNOSTIC N/A 2024    Procedure: BRONCHOSCOPY, WITH BRONCHOALVEOLAR LAVAGE;  Surgeon: Jimmy Farley MD;  Location:  GI    COLONOSCOPY          CV CORONARY ANGIOGRAM N/A 10/13/2021    Procedure: CV CORONARY ANGIOGRAM;  Surgeon: Yaya Hampton MD;  Location:  HEART CARDI[facility] CATH LAB    CV CORONARY LITHOTRIPSY PCI N/A 10/13/2021    Procedure: CV Coronary Lithotripsy PCI;  Surgeon: Yaya Hampton MD;  Location:  HEART CARD[facility] CATH LAB    CV INSTANTANEOUS WAVE-FREE RATIO N/A 10/13/2021    Procedure: Instantaneous Wave-Free Ratio;  Surgeon: Yaya Hampton MD;  Location:  HEART CARDIAC CATH LAB    CV INTRAVASULAR ULTRASOUND N/A 10/13/2021    Procedure: Intravascular Ultrasound;  Surgeon: Yaya Hampton MD;  Location:  HEART CARDIAC CATH LAB    CV PCI STENT DRUG ELUTING N/A 10/13/2021    Procedure: Percutaneous Coronary Intervention Stent Drug Eluting;  Surgeon: Yaya Hampton MD;  Location:  HEART CARDIAC CATH LAB    DAVINCI PROSTATECTOMY N/A 2020    Procedure: Robot-assisted laparoscopic radical prostatectomy, Bladder biopsy;  Surgeon: Kenrick Casiano MD;  Location: [facility]    ENDOSCOPIC RETROGRADE CHOLANGIOPANCREATOGRAM N/A 2018    Procedure: ENDOSCOPIC RETROGRADE CHOLANGIOPANCREATOGRAM, with Billary Sphincterotomy and Billary Stent Placement;  Surgeon:  "Omero Lawler MD;  Location: [facility] OR    ENDOSCOPIC RETROGRADE CHOLANGIOPANCREATOGRAM  2019    Procedure: COMBINED ENDOSCOPIC RETROGRADE CHOLANGIOPANCREATOGRAPHY, Bile Duct Stent Exchange;  Surgeon: Omero Lawler MD;  Location: [facility]    ENDOSCOPIC RETROGRADE CHOLANGIOPANCREATOGRAM N/A 2019    Procedure: ENDOSCOPIC RETROGRADE CHOLANGIOPANCREATOGRAPHY, WITH BILIARY STENT REMOVAL AND STONE EXTRACTION;  Surgeon: Omero Lawler MD;  Location: [facility] OR    HERNIORRHAPHY INGUINAL  2018    Procedure: Herniorrhaphy inguinal;  Surgeon: Emil Echevarria MD;  Location: [facility] OR    IR LIVER BIOPSY PERCUTANEOUS  2018    LAPAROTOMY EXPLORATORY      per the patient \"for infection in the LLQ\"     PICC INSERTION Right 2024    47 cm basilic    TRANSPLANT LIVER RECIPIENT  DONOR N/A 2018    Procedure: TRANSPLANT LIVER RECIPIENT  DONOR;  Surgeon: Emil Echevarria MD;  Location: [facility] OR     Current Outpatient Medications   Medication Sig Dispense Refill    apixaban ANTICOAGULANT (ELIQUIS) 5 MG tablet Take 1 tablet (5 mg) by mouth 2 times daily 60 tablet 1    atorvastatin (LIPITOR) 40 MG tablet Take 40 mg by mouth daily      atorvastatin (LIPITOR) 40 MG tablet Take 1 tablet (40 mg) by mouth daily 90 tablet 3    blood glucose (NO BRAND SPECIFIED) lancets standard To use to test glucose level in the blood Use to test blood sugar  3  times daily as directed. To accompany glucose monitor brands per insurance coverage. 200 each 1    blood glucose (NO BRAND SPECIFIED) lancets standard Use to test blood sugar 2 times daily or as directed. 50 lancet 3    blood glucose (NO BRAND SPECIFIED) test strip To use to test glucose level in the blood Use to test blood sugar  3 times daily as directed. To accompany glucose monitor brands per insurance coverage. 100 strip 3    blood glucose (NO BRAND SPECIFIED) test strip Use to test blood sugar 2 times daily or as directed. One touch " ultra test strips 50 strip 5    blood glucose (ONE TOUCH SURESOFT) lancing device Lancing device to be used with lancets. 1 each 0    blood glucose monitoring (NO BRAND SPECIFIED) meter device kit Use as directed Per insurance coverage 1 kit 0    blood glucose monitoring (ONE TOUCH ULTRA 2) meter device kit Use to test blood sugar 2 times daily or as directed. 1 kit 0    blood glucose monitoring (ONE TOUCH ULTRASOFT) lancets Use to test blood sugar 2 times daily. 100 each 6    diltiazem ER COATED BEADS (CARDIZEM CD/CARTIA XT) 120 MG 24 hr capsule Take 1 capsule (120 mg) by mouth daily 30 capsule 1    doxycycline hyclate (VIBRAMYCIN) 100 MG capsule Take 1 capsule (100 mg) by mouth 2 times daily 20 capsule 0    hydrALAZINE (APRESOLINE) 25 MG tablet Take 1 tablet (25 mg) by mouth 2 times daily 60 tablet 1    hydrALAZINE (APRESOLINE) 25 MG tablet Take 1 tablet (25 mg) by mouth 2 times daily 180 tablet 3    insulin glargine (LANTUS PEN) 100 UNIT/ML pen Inject 28 units subcutaneous at hs. 20 mL 3    insulin glargine (LANTUS SOLOSTAR) 100 UNIT/ML pen Inject 20 Units Subcutaneous at bedtime 15 mL 1    insulin pen needle (31G X 5 MM) 31G X 5 MM miscellaneous Use one  pen needles daily or as directed. 100 each 3    levofloxacin (LEVAQUIN) 500 MG tablet Take 1 tablet (500 mg) by mouth daily 3 tablet 0    metFORMIN (GLUCOPHAGE XR) 500 MG 24 hr tablet Take 1,000 mg by mouth 2 times daily (with meals)      metFORMIN (GLUCOPHAGE XR) 500 MG 24 hr tablet Take 2 tablets (1,000 mg) by mouth 2 times daily (with meals) 360 tablet 3    mycophenolate (GENERIC EQUIVALENT) 250 MG capsule Take 2 capsules (500 mg) by mouth 2 times daily for 90 days 90 capsule 3    mycophenolate (GENERIC EQUIVALENT) 500 MG tablet Take 500 mg by mouth 2 times daily      predniSONE (DELTASONE) 5 MG tablet Take 5 mg by mouth daily      predniSONE (DELTASONE) 5 MG tablet Take 1 tablet (5 mg) by mouth daily 90 tablet 1    ursodiol (ACTIGALL) 250 MG tablet Take 1  tablet (250 mg) by mouth 2 times daily Take 250 mg by mouth 2 times daily 180 tablet 3    ursodiol (ACTIGALL) 250 MG tablet Take 1 tablet (250 mg) by mouth 2 times daily 180 tablet 3    Vitamin D3 (CHOLECALCIFEROL) 25 mcg (1000 units) tablet Take 2 tablets (50 mcg) by mouth daily 180 tablet 3    VITAMIN D3 25 MCG (1000 UT) tablet Take 2 tablets by mouth daily at 2 pm      Alcohol Swabs PADS Use to swab the area of the injection or fam as directed Per insurance coverage (Patient not taking: Reported on 5/22/2024) 200 each 1    aspirin (ASA) 81 MG chewable tablet Take 1 tablet (81 mg) by mouth daily for 360 days (Patient not taking: Reported on 5/22/2024) 90 tablet 3    levothyroxine (SYNTHROID/LEVOTHROID) 150 MCG tablet Take 1 tablet (150 mcg) by mouth daily (Patient not taking: Reported on 5/22/2024) 90 tablet 3    levothyroxine (SYNTHROID/LEVOTHROID) 150 MCG tablet Take 1 tablet (150 mcg) by mouth daily (Patient not taking: Reported on 5/22/2024) 90 tablet 3    methocarbamol (ROBAXIN) 500 MG tablet Take 500 mg by mouth 4 times daily as needed for muscle spasms (Patient not taking: Reported on 5/22/2024)      sulfamethoxazole-trimethoprim (BACTRIM DS) 800-160 MG tablet Take 1 tablet by mouth 2 times daily (Patient not taking: Reported on 5/22/2024) 14 tablet 0     No current facility-administered medications for this visit.     No Known Allergies  Family History   Problem Relation Age of Onset    Memory loss Mother     Liver Disease Brother     Skin Cancer No family hx of     Melanoma No family hx of      Social History     Socioeconomic History    Marital status:      Spouse name: Not on file    Number of children: Not on file    Years of education: Not on file    Highest education level: Not on file   Occupational History    Not on file   Tobacco Use    Smoking status: Former     Current packs/day: 0.00     Average packs/day: (1.4 ttl pk-yrs)     Types: Cigarettes, Cigars     Start date: 8/14/1970      Quit date: 3/14/2018     Years since quittin.1    Smokeless tobacco: Never    Tobacco comments:     Quit smoking 2018, one cigarette after dinner   Vaping Use    Vaping status: Never Used   Substance and Sexual Activity    Alcohol use: No     Comment: Quit drinking 2018    Drug use: No    Sexual activity: Not on file   Other Topics Concern    Parent/sibling w/ CABG, MI or angioplasty before 65F 55M? Not Asked   Social History Narrative    Born in [place], came to [place] 30 years ago.     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cardboard, trimming meats     Social Determinants of Health     Financial Resource Strain: Not on file   Food Insecurity: Not on file   Transportation Needs: Not on file   Physical Activity: Not on file   Stress: Not on file   Social Connections: Not on file   Interpersonal Safety: Low Risk  (3/20/2024)    Interpersonal Safety     Do you feel physically and emotionally safe where you currently live?: Yes     Within the past 12 months, have you been hit, slapped, kicked or otherwise physically hurt by someone?: No     Within the past 12 months, have you been humiliated or emotionally abused in other ways by your partner or ex-partner?: No   Housing Stability: Not on file             Objective    BP (!) 149/84 (BP Location: Right arm, Patient Position: Sitting, Cuff Size: Adult Regular)   Pulse 77   Wt 89.1 kg (196 lb 8 oz)   SpO2 96%   BMI 29.10 kg/m    Body mass index is 29.1 kg/m .  Physical Exam   GENERAL: alert and no distress  NECK: no adenopathy, no asymmetry, masses, or scars  RESP: lungs clear to auscultation - no rales, rhonchi or wheezes  CV: regular rate and rhythm, normal S1 S2, no S3 or S4, no murmur, click or rub, no peripheral edema  ABDOMEN: soft, nontender, no hepatosplenomegaly, no masses and bowel sounds normal  MS: no gross musculoskeletal defects noted, no edema            Signed Electronically by: Jaswinder Anne MD

## 2024-05-23 ENCOUNTER — TELEPHONE (OUTPATIENT)
Dept: FAMILY MEDICINE | Facility: CLINIC | Age: 69
End: 2024-05-23
Payer: COMMERCIAL

## 2024-05-31 ENCOUNTER — ANCILLARY PROCEDURE (OUTPATIENT)
Dept: CT IMAGING | Facility: CLINIC | Age: 69
End: 2024-05-31
Attending: FAMILY MEDICINE
Payer: COMMERCIAL

## 2024-05-31 DIAGNOSIS — R06.00 DYSPNEA, UNSPECIFIED TYPE: ICD-10-CM

## 2024-05-31 DIAGNOSIS — R91.8 ABNORMAL FINDING ON LUNG IMAGING: ICD-10-CM

## 2024-05-31 DIAGNOSIS — R05.9 COUGH, UNSPECIFIED TYPE: ICD-10-CM

## 2024-05-31 PROCEDURE — 71250 CT THORAX DX C-: CPT | Mod: GC | Performed by: RADIOLOGY

## 2024-06-03 ENCOUNTER — APPOINTMENT (OUTPATIENT)
Dept: INTERPRETER SERVICES | Facility: CLINIC | Age: 69
End: 2024-06-03
Payer: COMMERCIAL

## 2024-06-04 ENCOUNTER — OFFICE VISIT (OUTPATIENT)
Dept: ENDOCRINOLOGY | Facility: CLINIC | Age: 69
End: 2024-06-04
Payer: COMMERCIAL

## 2024-06-04 ENCOUNTER — TELEPHONE (OUTPATIENT)
Dept: INTERNAL MEDICINE | Facility: CLINIC | Age: 69
End: 2024-06-04

## 2024-06-04 VITALS
DIASTOLIC BLOOD PRESSURE: 91 MMHG | HEART RATE: 95 BPM | SYSTOLIC BLOOD PRESSURE: 143 MMHG | WEIGHT: 194 LBS | BODY MASS INDEX: 28.73 KG/M2 | OXYGEN SATURATION: 98 %

## 2024-06-04 DIAGNOSIS — E11.9 DM TYPE 2, GOAL HBA1C 7%-8% (H): ICD-10-CM

## 2024-06-04 PROCEDURE — T1013 SIGN LANG/ORAL INTERPRETER: HCPCS | Mod: U3

## 2024-06-04 PROCEDURE — 99214 OFFICE O/P EST MOD 30 MIN: CPT | Performed by: PHYSICIAN ASSISTANT

## 2024-06-04 ASSESSMENT — PAIN SCALES - GENERAL: PAINLEVEL: NO PAIN (0)

## 2024-06-04 NOTE — PROGRESS NOTES
"HPI  Loy Limon is a 69 year old male with steroid/immunosuppressive induced diabetes.  Clinic visit for diabetes follow up today.    Pt underwent a liver transplant in Dec 2018 due to cirrhosis/hepatocellular carcinoma.  Pt also has hx of CAD s/p stent, atrial fib,HTN, BPH, hypothyroidism, prostate cancer and TB - lung latent.  For his diabetes, he is prescribed to take Lantus 20 units subcutaneous at hs, Jardiance 10 mg each am and Metformin 1000 mg BID.  He stopped taking Trulicity-  he states \" he did not like the injection \".  He tells me he has been missing his Lantus.  Most recent A1C 8.4% on 4/16/2024.  Previous A1C was 9.2%.  I have no blood sugar data to review today.  Patient is requesting that we order a new glucose meter and supplies.  Patient states that his fasting blood sugars have been in the 1  range.  He often will hold Lantus at night..  On ROS today, he reports not feeling well.  Recent CT of the chest showed left upper lobe.  Bronchial vascular groundglass and consolidative date of opacities.  He has an appointment with his primary care provider this Thursday to start antibiotics.  No fevers, night sweats or rigors.  Breathing stable at rest.  Fatigue.  Chest discomfort from coughing per patient.  Intermittent blurred vision.  Denies abd pain, diarrhea, melena or blood in stool.  No dysuria or hematuria.  No foot ulcers.    Diabetes Care  Retinopathy: none per pt; last seen in Oct 2022. Reminded him to see Oph.  Nephropathy: Urine protein + in 7/2023. Seen by Nephrology staff. Pt taking Cozaar.  Neuropathy: none.  Foot Exam: no ulcers.  Taking aspirin: no.  Lipids: LDL 58 in 7/2023. Pt taking Lipitor.  Insulin :basal insulin once a day.  DM meds: Metformin and Jardiance.  Pt did NOT like Trulicity.     Testing: glucose meter.    ROS  See under HPI.    Allergies  No Known Allergies    Medications  Current Outpatient Medications   Medication Sig Dispense Refill    Alcohol Swabs PADS " Use to swab the area of the injection or fam as directed Per insurance coverage 200 each 1    apixaban ANTICOAGULANT (ELIQUIS) 5 MG tablet Take 1 tablet (5 mg) by mouth 2 times daily 60 tablet 1    aspirin (ASA) 81 MG chewable tablet Take 1 tablet (81 mg) by mouth daily for 360 days 90 tablet 3    atorvastatin (LIPITOR) 40 MG tablet Take 1 tablet (40 mg) by mouth daily 90 tablet 3    blood glucose (NO BRAND SPECIFIED) lancets standard To use to test glucose level in the blood Use to test blood sugar  3  times daily as directed. To accompany glucose monitor brands per insurance coverage. 200 each 1    blood glucose (NO BRAND SPECIFIED) lancets standard Use to test blood sugar 2 times daily or as directed. 50 lancet 3    blood glucose (NO BRAND SPECIFIED) test strip To use to test glucose level in the blood Use to test blood sugar  3 times daily as directed. To accompany glucose monitor brands per insurance coverage. 100 strip 3    blood glucose (NO BRAND SPECIFIED) test strip Use to test blood sugar 2 times daily or as directed. One touch ultra test strips 50 strip 5    blood glucose (ONE TOUCH SURESOFT) lancing device Lancing device to be used with lancets. 1 each 0    blood glucose monitoring (NO BRAND SPECIFIED) meter device kit Use as directed Per insurance coverage 1 kit 0    blood glucose monitoring (ONE TOUCH ULTRA 2) meter device kit Use to test blood sugar 2 times daily or as directed. 1 kit 0    blood glucose monitoring (ONE TOUCH ULTRASOFT) lancets Use to test blood sugar 2 times daily. 100 each 6    diltiazem ER COATED BEADS (CARDIZEM CD/CARTIA XT) 120 MG 24 hr capsule Take 1 capsule (120 mg) by mouth daily 30 capsule 1    doxycycline hyclate (VIBRAMYCIN) 100 MG capsule Take 1 capsule (100 mg) by mouth 2 times daily 20 capsule 0    hydrALAZINE (APRESOLINE) 25 MG tablet Take 1 tablet (25 mg) by mouth 2 times daily 180 tablet 3    insulin glargine (LANTUS PEN) 100 UNIT/ML pen Inject 28 units subcutaneous  at hs. 20 mL 3    insulin glargine (LANTUS SOLOSTAR) 100 UNIT/ML pen Inject 20 Units Subcutaneous at bedtime 15 mL 1    insulin pen needle (31G X 5 MM) 31G X 5 MM miscellaneous Use one  pen needles daily or as directed. 100 each 3    levofloxacin (LEVAQUIN) 500 MG tablet Take 1 tablet (500 mg) by mouth daily 3 tablet 0    levothyroxine (SYNTHROID/LEVOTHROID) 150 MCG tablet Take 1 tablet (150 mcg) by mouth daily 90 tablet 3    metFORMIN (GLUCOPHAGE XR) 500 MG 24 hr tablet Take 1,000 mg by mouth 2 times daily (with meals)      methocarbamol (ROBAXIN) 500 MG tablet Take 500 mg by mouth 4 times daily as needed for muscle spasms      mycophenolate (GENERIC EQUIVALENT) 250 MG capsule Take 2 capsules (500 mg) by mouth 2 times daily for 90 days 90 capsule 3    mycophenolate (GENERIC EQUIVALENT) 500 MG tablet Take 500 mg by mouth 2 times daily      predniSONE (DELTASONE) 5 MG tablet Take 1 tablet (5 mg) by mouth daily 90 tablet 1    sulfamethoxazole-trimethoprim (BACTRIM DS) 800-160 MG tablet Take 1 tablet by mouth 2 times daily 14 tablet 0    ursodiol (ACTIGALL) 250 MG tablet Take 1 tablet (250 mg) by mouth 2 times daily 180 tablet 3    Vitamin D3 (CHOLECALCIFEROL) 25 mcg (1000 units) tablet Take 2 tablets (50 mcg) by mouth daily 180 tablet 3       Family History  family history includes Liver Disease in his brother; Memory loss in his mother.    Social History   reports that he quit smoking about 6 years ago. His smoking use included cigarettes and cigars. He started smoking about 53 years ago. He has a 1.4 pack-year smoking history. He has never used smokeless tobacco. He reports that he does not drink alcohol and does not use drugs.     Past Medical History  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/28/2018    BPH (benign prostatic hyperplasia)     Cancer (H)     hepatocellualr carcinoma    Cirrhosis of liver (H) 05/08/2018    Diabetes (H)     Enlarged prostate      Hypothyroidism     Impotence of organic origin 2020    Inguinal hernia     Repaired with mesh on 18    Liver lesion 2018    Liver transplanted (H) 2018     donor liver transplant    Portal vein thrombosis     on path explant    Postoperative atrial fibrillation (H) 2018    Prostate cancer (H)     TB lung, latent     Treated        Past Surgical History:   Procedure Laterality Date    APPENDECTOMY      BRONCHOSCOPY (RIGID OR FLEXIBLE), DIAGNOSTIC N/A 2024    Procedure: BRONCHOSCOPY, WITH BRONCHOALVEOLAR LAVAGE;  Surgeon: Jimmy Farley MD;  Location:  GI    COLONOSCOPY          CV CORONARY ANGIOGRAM N/A 10/13/2021    Procedure: CV CORONARY ANGIOGRAM;  Surgeon: Yaya Hampton MD;  Location:  [facility]    CV CORONARY LITHOTRIPSY PCI N/A 10/13/2021    Procedure: CV Coronary Lithotripsy PCI;  Surgeon: Yaya Hampton MD;  Location:  [facility]    CV INSTANTANEOUS WAVE-FREE RATIO N/A 10/13/2021    Procedure: Instantaneous Wave-Free Ratio;  Surgeon: Yaya Hampton MD;  Location:  [facility]    CV INTRAVASULAR ULTRASOUND N/A 10/13/2021    Procedure: Intravascular Ultrasound;  Surgeon: Yaya Hampton MD;  Location:  [facility]    CV PCI STENT DRUG ELUTING N/A 10/13/2021    Procedure: Percutaneous Coronary Intervention Stent Drug Eluting;  Surgeon: Yaya Hampton MD;  Location:  [facility]    DAVINCI PROSTATECTOMY N/A 2020    Procedure: Robot-assisted laparoscopic radical prostatectomy, Bladder biopsy;  Surgeon: Kenrick Casiano MD;  Location: [facility]    ENDOSCOPIC RETROGRADE CHOLANGIOPANCREATOGRAM N/A 2018    Procedure: ENDOSCOPIC RETROGRADE CHOLANGIOPANCREATOGRAM, with Billary Sphincterotomy and Billary Stent Placement;  Surgeon: Omero Lawler MD;  Location: [facility]    ENDOSCOPIC RETROGRADE  "CHOLANGIOPANCREATOGRAM  2019    Procedure: COMBINED ENDOSCOPIC RETROGRADE CHOLANGIOPANCREATOGRAPHY, Bile Duct Stent Exchange;  Surgeon: Omero Lawler MD;  Location: [facility]    ENDOSCOPIC RETROGRADE CHOLANGIOPANCREATOGRAM N/A 2019    Procedure: ENDOSCOPIC RETROGRADE CHOLANGIOPANCREATOGRAPHY, WITH BILIARY STENT REMOVAL AND STONE EXTRACTION;  Surgeon: Omero Lawler MD;  Location: [facility]    HERNIORRHAPHY INGUINAL  2018    Procedure: Herniorrhaphy inguinal;  Surgeon: Emil Echevarria MD;  Location: [facility]    IR LIVER BIOPSY PERCUTANEOUS  2018    LAPAROTOMY EXPLORATORY      per the patient \"for infection in the LLQ\"     PICC INSERTION Right 2024    47 cm basilic    TRANSPLANT LIVER RECIPIENT  DONOR N/A 2018    Procedure: TRANSPLANT LIVER RECIPIENT  DONOR;  Surgeon: Emil Echevarria MD;  Location: [facility]       Physical Exam  BP (!) 143/91   Pulse 95   Wt 88 kg (194 lb)   SpO2 98%   BMI 28.73 kg/m       General: Patient in no acute distress.  FEET: No ulcers.    RESULTS  Creatinine   Date Value Ref Range Status   2024 1.26 (H) 0.67 - 1.17 mg/dL Final   2021 0.79 0.66 - 1.25 mg/dL Final     GFR Estimate   Date Value Ref Range Status   2024 61 >60 mL/min/1.73m2 Final   2021 >90 >60 mL/min/[1.73_m2] Final     Comment:     Non  GFR Calc  Starting 2018, serum creatinine based estimated GFR (eGFR) will be   calculated using the Chronic Kidney Disease Epidemiology Collaboration   (CKD-EPI) equation.       Hemoglobin A1C   Date Value Ref Range Status   2024 8.4 (H) <5.7 % Final     Comment:     Normal <5.7%   Prediabetes 5.7-6.4%    Diabetes 6.5% or higher     Note: Adopted from ADA consensus guidelines.   2021 9.1 (H) 0 - 5.6 % Final     Comment:     Normal <5.7% Prediabetes 5.7-6.4%  Diabetes 6.5% or higher - adopted from ADA   consensus guidelines.       Potassium   Date Value Ref Range Status "   05/21/2024 4.6 3.4 - 5.3 mmol/L Final   07/27/2022 4.5 3.4 - 5.3 mmol/L Final   07/07/2021 4.7 3.4 - 5.3 mmol/L Final     Potassium POCT   Date Value Ref Range Status   07/19/2023 5.0 3.4 - 5.3 mmol/L Final     ALT   Date Value Ref Range Status   05/21/2024 24 0 - 70 U/L Final     Comment:     Reference intervals for this test were updated on 6/12/2023 to more accurately reflect our healthy population. There may be differences in the flagging of prior results with similar values performed with this method. Interpretation of those prior results can be made in the context of the updated reference intervals.     07/07/2021 17 0 - 70 U/L Final     AST   Date Value Ref Range Status   05/21/2024 18 0 - 45 U/L Final     Comment:     Reference intervals for this test were updated on 6/12/2023 to more accurately reflect our healthy population. There may be differences in the flagging of prior results with similar values performed with this method. Interpretation of those prior results can be made in the context of the updated reference intervals.   07/07/2021 11 0 - 45 U/L Final     TSH   Date Value Ref Range Status   05/02/2024 7.75 (H) 0.30 - 4.20 uIU/mL Final   07/27/2022 11.01 (H) 0.40 - 4.00 mU/L Final   03/24/2021 9.86 (H) 0.40 - 4.00 mU/L Final     T4 Free   Date Value Ref Range Status   03/24/2021 0.96 0.76 - 1.46 ng/dL Final     Free T4   Date Value Ref Range Status   05/02/2024 0.96 0.90 - 1.70 ng/dL Final       Cholesterol   Date Value Ref Range Status   07/19/2023 153 <200 mg/dL Final   01/25/2022 158 <200 mg/dL Final   09/25/2020 143 <200 mg/dL Final   10/23/2019 126 <200 mg/dL Final     HDL Cholesterol   Date Value Ref Range Status   09/25/2020 38 (L) >39 mg/dL Final   10/23/2019 38 (L) >39 mg/dL Final     Direct Measure HDL   Date Value Ref Range Status   07/19/2023 61 >=40 mg/dL Final   01/25/2022 57 >=40 mg/dL Final     LDL Cholesterol Calculated   Date Value Ref Range Status   07/19/2023 58 <=100 mg/dL  Final   01/25/2022 74 <=100 mg/dL Final   09/25/2020 69 <100 mg/dL Final     Comment:     Desirable:       <100 mg/dl   10/23/2019 71 <100 mg/dL Final     Comment:     Desirable:       <100 mg/dl     Triglycerides   Date Value Ref Range Status   07/19/2023 170 (H) <150 mg/dL Final   01/25/2022 136 <150 mg/dL Final   09/25/2020 182 (H) <150 mg/dL Final     Comment:     Borderline high:  150-199 mg/dl  High:             200-499 mg/dl  Very high:       >499 mg/dl     10/23/2019 89 <150 mg/dL Final         ASSESSMENT/PLAN:      DIABETES: Reminded Mr. Limon to take Lantus 20 units subcutaneous at hs every evening.  Continue metformin 1000 mg BID and Jardiance 10 mg each am. He tells me he did not like Trulicity and is not interested in any more injections at this time. Again, I reviewed how Jardiance works and possible side effects of the drug including dehydration, UTI, groin yeast infection, hypoglycemia etc.    Pt denies hx of retinopathy.    FOLLOW UP: With Dr. Sue Tavares in September 2024.  New glucose meter and supplies ordered today.    Time spent reviewing chart, labs and glucose data today = 5 minutes.  Time for clinic visit today = 20 minutes.  Time for documentation today = 10 minutes.    Total time for visit today = 35 minutes.    Gilma Guthrie PA-C

## 2024-06-04 NOTE — NURSING NOTE
Chief Complaint   Patient presents with    Diabetes     Unable to upload the meter, unable to see blood glucose readings on the meter.     Blood pressure (!) 143/91, pulse 95, weight 88 kg (194 lb), SpO2 98%.    Olamide Renee

## 2024-06-04 NOTE — LETTER
"6/4/2024       RE: Loy Limon  [address]  [facility] [address]     Dear Colleague,    Thank you for referring your patient, Loy Limon, to the [facility] ENDOCR[facility] [place] at [facility]. Please see a copy of my visit note below.    CASEY Limon is a 69 year old male with steroid/immunosuppressive induced diabetes.  Clinic visit for diabetes follow up today.    Pt underwent a liver transplant in Dec 2018 due to cirrhosis/hepatocellular carcinoma.  Pt also has hx of CAD s/p stent, atrial fib,HTN, BPH, hypothyroidism, prostate cancer and TB - lung latent.  For his diabetes, he is prescribed to take Lantus 20 units subcutaneous at hs, Jardiance 10 mg each am and Metformin 1000 mg BID.  He stopped taking Trulicity-  he states \" he did not like the injection \".  He tells me he has been missing his Lantus.  Most recent A1C 8.4% on 4/16/2024.  Previous A1C was 9.2%.  I have no blood sugar data to review today.  Patient is requesting that we order a new glucose meter and supplies.  Patient states that his fasting blood sugars have been in the 1  range.  He often will hold Lantus at night..  On ROS today, he reports not feeling well.  Recent CT of the chest showed left upper lobe.  Bronchial vascular groundglass and consolidative date of opacities.  He has an appointment with his primary care provider this Thursday to start antibiotics.  No fevers, night sweats or rigors.  Breathing stable at rest.  Fatigue.  Chest discomfort from coughing per patient.  Intermittent blurred vision.  Denies abd pain, diarrhea, melena or blood in stool.  No dysuria or hematuria.  No foot ulcers.    Diabetes Care  Retinopathy: none per pt; last seen in Oct 2022. Reminded him to see Oph.  Nephropathy: Urine protein + in 7/2023. Seen by Nephrology staff. Pt taking Cozaar.  Neuropathy: none.  Foot Exam: no ulcers.  Taking aspirin: no.  Lipids: LDL 58 " in 7/2023. Pt taking Lipitor.  Insulin :basal insulin once a day.  DM meds: Metformin and Jardiance.  Pt did NOT like Trulicity.     Testing: glucose meter.    ROS  See under HPI.    Allergies  No Known Allergies    Medications  Current Outpatient Medications   Medication Sig Dispense Refill    Alcohol Swabs PADS Use to swab the area of the injection or fam as directed Per insurance coverage 200 each 1    apixaban ANTICOAGULANT (ELIQUIS) 5 MG tablet Take 1 tablet (5 mg) by mouth 2 times daily 60 tablet 1    aspirin (ASA) 81 MG chewable tablet Take 1 tablet (81 mg) by mouth daily for 360 days 90 tablet 3    atorvastatin (LIPITOR) 40 MG tablet Take 1 tablet (40 mg) by mouth daily 90 tablet 3    blood glucose (NO BRAND SPECIFIED) lancets standard To use to test glucose level in the blood Use to test blood sugar  3  times daily as directed. To accompany glucose monitor brands per insurance coverage. 200 each 1    blood glucose (NO BRAND SPECIFIED) lancets standard Use to test blood sugar 2 times daily or as directed. 50 lancet 3    blood glucose (NO BRAND SPECIFIED) test strip To use to test glucose level in the blood Use to test blood sugar  3 times daily as directed. To accompany glucose monitor brands per insurance coverage. 100 strip 3    blood glucose (NO BRAND SPECIFIED) test strip Use to test blood sugar 2 times daily or as directed. One touch ultra test strips 50 strip 5    blood glucose (ONE TOUCH SURESOFT) lancing device Lancing device to be used with lancets. 1 each 0    blood glucose monitoring (NO BRAND SPECIFIED) meter device kit Use as directed Per insurance coverage 1 kit 0    blood glucose monitoring (ONE TOUCH ULTRA 2) meter device kit Use to test blood sugar 2 times daily or as directed. 1 kit 0    blood glucose monitoring (ONE TOUCH ULTRASOFT) lancets Use to test blood sugar 2 times daily. 100 each 6    diltiazem ER COATED BEADS (CARDIZEM CD/CARTIA XT) 120 MG 24 hr capsule Take 1 capsule (120 mg)  by mouth daily 30 capsule 1    doxycycline hyclate (VIBRAMYCIN) 100 MG capsule Take 1 capsule (100 mg) by mouth 2 times daily 20 capsule 0    hydrALAZINE (APRESOLINE) 25 MG tablet Take 1 tablet (25 mg) by mouth 2 times daily 180 tablet 3    insulin glargine (LANTUS PEN) 100 UNIT/ML pen Inject 28 units subcutaneous at hs. 20 mL 3    insulin glargine (LANTUS SOLOSTAR) 100 UNIT/ML pen Inject 20 Units Subcutaneous at bedtime 15 mL 1    insulin pen needle (31G X 5 MM) 31G X 5 MM miscellaneous Use one  pen needles daily or as directed. 100 each 3    levofloxacin (LEVAQUIN) 500 MG tablet Take 1 tablet (500 mg) by mouth daily 3 tablet 0    levothyroxine (SYNTHROID/LEVOTHROID) 150 MCG tablet Take 1 tablet (150 mcg) by mouth daily 90 tablet 3    metFORMIN (GLUCOPHAGE XR) 500 MG 24 hr tablet Take 1,000 mg by mouth 2 times daily (with meals)      methocarbamol (ROBAXIN) 500 MG tablet Take 500 mg by mouth 4 times daily as needed for muscle spasms      mycophenolate (GENERIC EQUIVALENT) 250 MG capsule Take 2 capsules (500 mg) by mouth 2 times daily for 90 days 90 capsule 3    mycophenolate (GENERIC EQUIVALENT) 500 MG tablet Take 500 mg by mouth 2 times daily      predniSONE (DELTASONE) 5 MG tablet Take 1 tablet (5 mg) by mouth daily 90 tablet 1    sulfamethoxazole-trimethoprim (BACTRIM DS) 800-160 MG tablet Take 1 tablet by mouth 2 times daily 14 tablet 0    ursodiol (ACTIGALL) 250 MG tablet Take 1 tablet (250 mg) by mouth 2 times daily 180 tablet 3    Vitamin D3 (CHOLECALCIFEROL) 25 mcg (1000 units) tablet Take 2 tablets (50 mcg) by mouth daily 180 tablet 3       Family History  family history includes Liver Disease in his brother; Memory loss in his mother.    Social History   reports that he quit smoking about 6 years ago. His smoking use included cigarettes and cigars. He started smoking about 53 years ago. He has a 1.4 pack-year smoking history. He has never used smokeless tobacco. He reports that he does not drink alcohol  and does not use drugs.     Past Medical History  Past Medical History:   Diagnosis Date    Anemia     Biliary stricture of transplanted liver (H) 2018    BPH (benign prostatic hyperplasia)     Cancer (H)     hepatocellualr carcinoma    Cirrhosis of liver (H) 2018    Diabetes (H)     Enlarged prostate     Hypothyroidism     Impotence of organic origin 2020    Inguinal hernia     Repaired with mesh on 18    Liver lesion 2018    Liver transplanted (H) 2018     donor liver transplant    Portal vein thrombosis     on path explant    Postoperative atrial fibrillation (H) 2018    Prostate cancer (H)     TB lung, latent     Treated        Past Surgical History:   Procedure Laterality Date    APPENDECTOMY      BRONCHOSCOPY (RIGID OR FLEXIBLE), DIAGNOSTIC N/A 2024    Procedure: BRONCHOSCOPY, WITH BRONCHOALVEOLAR LAVAGE;  Surgeon: Jimmy Farley MD;  Location:  GI    COLONOSCOPY          CV CORONARY ANGIOGRAM N/A 10/13/2021    Procedure: CV CORONARY ANGIOGRAM;  Surgeon: Yaya Hampton MD;  Location:  HEART CARDIAC CATH LAB    CV CORONARY LITHOTRIPSY PCI N/A 10/13/2021    Procedure: CV Coronary Lithotripsy PCI;  Surgeon: Yaya Hampton MD;  Location:  HEART CARDIAC CATH LAB    CV INSTANTANEOUS WAVE-FREE RATIO N/A 10/13/2021    Procedure: Instantaneous Wave-Free Ratio;  Surgeon: Yaya Hampton MD;  Location:  HEART CARDIAC CATH LAB    CV INTRAVASULAR ULTRASOUND N/A 10/13/2021    Procedure: Intravascular Ultrasound;  Surgeon: Yaya Hampton MD;  Location:  HEART CARDIAC CATH LAB    CV PCI STENT DRUG ELUTING N/A 10/13/2021    Procedure: Percutaneous Coronary Intervention Stent Drug Eluting;  Surgeon: Yaya Hampton MD;  Location:  HEART CARDIAC CATH LAB    DAVINCI PROSTATECTOMY N/A 2020    Procedure: Robot-assisted laparoscopic radical prostatectomy, Bladder biopsy;  Surgeon:  "Kenrick Casiano MD;  Location: [facility]    ENDOSCOPIC RETROGRADE CHOLANGIOPANCREATOGRAM N/A 2018    Procedure: ENDOSCOPIC RETROGRADE CHOLANGIOPANCREATOGRAM, with Billary Sphincterotomy and Billary Stent Placement;  Surgeon: Omero Lawler MD;  Location: [facility] OR    ENDOSCOPIC RETROGRADE CHOLANGIOPANCREATOGRAM  2019    Procedure: COMBINED ENDOSCOPIC RETROGRADE CHOLANGIOPANCREATOGRAPHY, Bile Duct Stent Exchange;  Surgeon: Omero Lawler MD;  Location: [facility] OR    ENDOSCOPIC RETROGRADE CHOLANGIOPANCREATOGRAM N/A 2019    Procedure: ENDOSCOPIC RETROGRADE CHOLANGIOPANCREATOGRAPHY, WITH BILIARY STENT REMOVAL AND STONE EXTRACTION;  Surgeon: Omero Lawler MD;  Location: [facility] OR    HERNIORRHAPHY INGUINAL  2018    Procedure: Herniorrhaphy inguinal;  Surgeon: Emil Echevarria MD;  Location: [facility] OR    IR LIVER BIOPSY PERCUTANEOUS  2018    LAPAROTOMY EXPLORATORY      per the patient \"for infection in the LLQ\"     PICC INSERTION Right 2024    47 cm basilic    TRANSPLANT LIVER RECIPIENT  DONOR N/A 2018    Procedure: TRANSPLANT LIVER RECIPIENT  DONOR;  Surgeon: Emil Echevarria MD;  Location: [facility] OR       Physical Exam  BP (!) 143/91   Pulse 95   Wt 88 kg (194 lb)   SpO2 98%   BMI 28.73 kg/m       General: Patient in no acute distress.  FEET: No ulcers.    RESULTS  Creatinine   Date Value Ref Range Status   2024 1.26 (H) 0.67 - 1.17 mg/dL Final   2021 0.79 0.66 - 1.25 mg/dL Final     GFR Estimate   Date Value Ref Range Status   2024 61 >60 mL/min/1.73m2 Final   2021 >90 >60 mL/min/[1.73_m2] Final     Comment:     Non  GFR Calc  Starting 2018, serum creatinine based estimated GFR (eGFR) will be   calculated using the Chronic Kidney Disease Epidemiology Collaboration   (CKD-EPI) equation.       Hemoglobin A1C   Date Value Ref Range Status   2024 8.4 (H) <5.7 % Final     Comment:     Normal " <5.7%   Prediabetes 5.7-6.4%    Diabetes 6.5% or higher     Note: Adopted from ADA consensus guidelines.   07/07/2021 9.1 (H) 0 - 5.6 % Final     Comment:     Normal <5.7% Prediabetes 5.7-6.4%  Diabetes 6.5% or higher - adopted from ADA   consensus guidelines.       Potassium   Date Value Ref Range Status   05/21/2024 4.6 3.4 - 5.3 mmol/L Final   07/27/2022 4.5 3.4 - 5.3 mmol/L Final   07/07/2021 4.7 3.4 - 5.3 mmol/L Final     Potassium POCT   Date Value Ref Range Status   07/19/2023 5.0 3.4 - 5.3 mmol/L Final     ALT   Date Value Ref Range Status   05/21/2024 24 0 - 70 U/L Final     Comment:     Reference intervals for this test were updated on 6/12/2023 to more accurately reflect our healthy population. There may be differences in the flagging of prior results with similar values performed with this method. Interpretation of those prior results can be made in the context of the updated reference intervals.     07/07/2021 17 0 - 70 U/L Final     AST   Date Value Ref Range Status   05/21/2024 18 0 - 45 U/L Final     Comment:     Reference intervals for this test were updated on 6/12/2023 to more accurately reflect our healthy population. There may be differences in the flagging of prior results with similar values performed with this method. Interpretation of those prior results can be made in the context of the updated reference intervals.   07/07/2021 11 0 - 45 U/L Final     TSH   Date Value Ref Range Status   05/02/2024 7.75 (H) 0.30 - 4.20 uIU/mL Final   07/27/2022 11.01 (H) 0.40 - 4.00 mU/L Final   03/24/2021 9.86 (H) 0.40 - 4.00 mU/L Final     T4 Free   Date Value Ref Range Status   03/24/2021 0.96 0.76 - 1.46 ng/dL Final     Free T4   Date Value Ref Range Status   05/02/2024 0.96 0.90 - 1.70 ng/dL Final       Cholesterol   Date Value Ref Range Status   07/19/2023 153 <200 mg/dL Final   01/25/2022 158 <200 mg/dL Final   09/25/2020 143 <200 mg/dL Final   10/23/2019 126 <200 mg/dL Final     HDL Cholesterol    Date Value Ref Range Status   09/25/2020 38 (L) >39 mg/dL Final   10/23/2019 38 (L) >39 mg/dL Final     Direct Measure HDL   Date Value Ref Range Status   07/19/2023 61 >=40 mg/dL Final   01/25/2022 57 >=40 mg/dL Final     LDL Cholesterol Calculated   Date Value Ref Range Status   07/19/2023 58 <=100 mg/dL Final   01/25/2022 74 <=100 mg/dL Final   09/25/2020 69 <100 mg/dL Final     Comment:     Desirable:       <100 mg/dl   10/23/2019 71 <100 mg/dL Final     Comment:     Desirable:       <100 mg/dl     Triglycerides   Date Value Ref Range Status   07/19/2023 170 (H) <150 mg/dL Final   01/25/2022 136 <150 mg/dL Final   09/25/2020 182 (H) <150 mg/dL Final     Comment:     Borderline high:  150-199 mg/dl  High:             200-499 mg/dl  Very high:       >499 mg/dl     10/23/2019 89 <150 mg/dL Final         ASSESSMENT/PLAN:      DIABETES: Reminded Mr. Limon to take Lantus 20 units subcutaneous at hs every evening.  Continue metformin 1000 mg BID and Jardiance 10 mg each am. He tells me he did not like Trulicity and is not interested in any more injections at this time. Again, I reviewed how Jardiance works and possible side effects of the drug including dehydration, UTI, groin yeast infection, hypoglycemia etc.    Pt denies hx of retinopathy.    FOLLOW UP: With Dr. Sue Tavares in September 2024.  New glucose meter and supplies ordered today.    Time spent reviewing chart, labs and glucose data today = 5 minutes.  Time for clinic visit today = 20 minutes.  Time for documentation today = 10 minutes.    Total time for visit today = 35 minutes.    Gilma Guthrie PA-C    Outcome for 05/23/24 4:02 PM: [employer] message sent  ANTHONY Roche

## 2024-06-04 NOTE — TELEPHONE ENCOUNTER
Pt states he finished all course of doxycycline, but still feeling weak, having dry cough and slightly shortness of breathing. I moved up his appt to 6/6/24. In the mean time, he was advised to go to ED if worsens.

## 2024-06-04 NOTE — TELEPHONE ENCOUNTER
I called the pt via , but no answer and no voice mail was set up. I will try to call him again.    Soon[facility]  -----------------------------------------------------------------------------------        ----- Message from Jaswinder Anne MD sent at 6/3/2024  1:38 PM CDT -----  He should be on doxy    Sees me in a week    Chest ct shows likely infection and/or other inflammation in lungs    Can you check on him-if getting worse go to ER, o/w keep appt    He is immune suppressed

## 2024-06-06 ENCOUNTER — OFFICE VISIT (OUTPATIENT)
Dept: FAMILY MEDICINE | Facility: CLINIC | Age: 69
End: 2024-06-06
Payer: COMMERCIAL

## 2024-06-06 VITALS
WEIGHT: 195.3 LBS | OXYGEN SATURATION: 97 % | DIASTOLIC BLOOD PRESSURE: 85 MMHG | BODY MASS INDEX: 28.93 KG/M2 | HEART RATE: 69 BPM | SYSTOLIC BLOOD PRESSURE: 150 MMHG

## 2024-06-06 DIAGNOSIS — R05.9 COUGH, UNSPECIFIED TYPE: ICD-10-CM

## 2024-06-06 DIAGNOSIS — R91.8 ABNORMAL FINDING ON LUNG IMAGING: Primary | ICD-10-CM

## 2024-06-06 PROCEDURE — G2211 COMPLEX E/M VISIT ADD ON: HCPCS | Performed by: FAMILY MEDICINE

## 2024-06-06 PROCEDURE — 99214 OFFICE O/P EST MOD 30 MIN: CPT | Performed by: FAMILY MEDICINE

## 2024-06-06 NOTE — PROGRESS NOTES
Assessment & Plan     Abnormal finding on lung imaging  Augmentin    Cough, unspecified type  See pulm, possible organining pneumonia  - Adult Pulmonary Medicine  Referral; Future  - amoxicillin-clavulanate (AUGMENTIN) 875-125 MG tablet; Take 1 tablet by mouth 2 times daily    The longitudinal plan of care for the diagnosis(es)/condition(s) as documented were addressed during this visit. Due to the added complexity in care, I will continue to support Loy in the subsequent management and with ongoing continuity of care.  32 minutes spent by me on the date of the encounter doing chart review, history and exam, documentation and further activities per the note  No follow-ups on file.    Teddy Granado is a 69 year old, presenting for the following health issues:  Follow Up (Respiration discomfort from infection./Discuss Bronchopneumonia.)      6/6/2024     5:04 PM   Additional Questions   Roomed by KTR     History of Present Illness       Reason for visit:  Follow up    He eats 4 or more servings of fruits and vegetables daily.He consumes 0 sweetened beverage(s) daily.He exercises with enough effort to increase his heart rate 30 to 60 minutes per day.  He exercises with enough effort to increase his heart rate 5 days per week.   He is taking medications regularly.   Cough, fatigue, mild SOB  Did not make cardiology appt yet, rvwd rationale, Dr Castelan note/referral, will work on appt  CT chest rvwd, possible organizing pneumonia. Also rvwd his immune suppressed state: meds, DM.  No new c/o since last visit   ipad here  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/28/2018    BPH (benign prostatic hyperplasia)     Cancer (H)     hepatocellualr carcinoma    Cirrhosis of liver (H) 05/08/2018    Diabetes (H)     Enlarged prostate     Hypothyroidism     Impotence of organic origin 09/25/2020    Inguinal hernia     Repaired with mesh on  18    Liver lesion 2018    Liver transplanted (H) 2018     donor liver transplant    Portal vein thrombosis     on path explant    Postoperative atrial fibrillation (H) 2018    Prostate cancer (H)     TB lung, latent     Treated      Past Surgical History:   Procedure Laterality Date    APPENDECTOMY      BRONCHOSCOPY (RIGID OR FLEXIBLE), DIAGNOSTIC N/A 2024    Procedure: BRONCHOSCOPY, WITH BRONCHOALVEOLAR LAVAGE;  Surgeon: Jimmy Farley MD;  Location:  GI    COLONOSCOPY          CV CORONARY ANGIOGRAM N/A 10/13/2021    Procedure: CV CORONARY ANGIOGRAM;  Surgeon: Yaya Hampton MD;  Location:  [facility]    CV CORONARY LITHOTRIPSY PCI N/A 10/13/2021    Procedure: CV Coronary Lithotripsy PCI;  Surgeon: Yaya Hampton MD;  Location:  [facility]    CV INSTANTANEOUS WAVE-FREE RATIO N/A 10/13/2021    Procedure: Instantaneous Wave-Free Ratio;  Surgeon: Yaya Hampton MD;  Location:  [facility]    CV INTRAVASULAR ULTRASOUND N/A 10/13/2021    Procedure: Intravascular Ultrasound;  Surgeon: Yaya Hampton MD;  Location:  [facility]    CV PCI STENT DRUG ELUTING N/A 10/13/2021    Procedure: Percutaneous Coronary Intervention Stent Drug Eluting;  Surgeon: Yaya Hampton MD;  Location:  [facility]    DAVINCI PROSTATECTOMY N/A 2020    Procedure: Robot-assisted laparoscopic radical prostatectomy, Bladder biopsy;  Surgeon: Kenrick Casiano MD;  Location: [facility]    ENDOSCOPIC RETROGRADE CHOLANGIOPANCREATOGRAM N/A 2018    Procedure: ENDOSCOPIC RETROGRADE CHOLANGIOPANCREATOGRAM, with Billary Sphincterotomy and Billary Stent Placement;  Surgeon: Omero Lawler MD;  Location: [facility]    ENDOSCOPIC RETROGRADE CHOLANGIOPANCREATOGRAM  2019    Procedure: COMBINED ENDOSCOPIC RETROGRADE CHOLANGIOPANCREATOGRAPHY, Bile Duct  "Stent Exchange;  Surgeon: Omero Lawler MD;  Location: [facility]    ENDOSCOPIC RETROGRADE CHOLANGIOPANCREATOGRAM N/A 2019    Procedure: ENDOSCOPIC RETROGRADE CHOLANGIOPANCREATOGRAPHY, WITH BILIARY STENT REMOVAL AND STONE EXTRACTION;  Surgeon: Omero Lawler MD;  Location: [facility]    HERNIORRHAPHY INGUINAL  2018    Procedure: Herniorrhaphy inguinal;  Surgeon: Emil Echevarria MD;  Location: [facility]    IR LIVER BIOPSY PERCUTANEOUS  2018    LAPAROTOMY EXPLORATORY      per the patient \"for infection in the LLQ\"     PICC INSERTION Right 2024    47 cm basilic    TRANSPLANT LIVER RECIPIENT  DONOR N/A 2018    Procedure: TRANSPLANT LIVER RECIPIENT  DONOR;  Surgeon: Emil Echevarria MD;  Location: [facility]     Current Outpatient Medications   Medication Sig Dispense Refill    Alcohol Swabs PADS Use to swab the area of the injection or fam as directed Per insurance coverage 200 each 1    amoxicillin-clavulanate (AUGMENTIN) 875-125 MG tablet Take 1 tablet by mouth 2 times daily 20 tablet 0    apixaban ANTICOAGULANT (ELIQUIS) 5 MG tablet Take 1 tablet (5 mg) by mouth 2 times daily 60 tablet 1    aspirin (ASA) 81 MG chewable tablet Take 1 tablet (81 mg) by mouth daily for 360 days 90 tablet 3    atorvastatin (LIPITOR) 40 MG tablet Take 1 tablet (40 mg) by mouth daily 90 tablet 3    blood glucose (NO BRAND SPECIFIED) lancets standard Use to test blood sugar 3 times daily or as directed.Please dispense insurance covered brand 300 Lancet 3    blood glucose (NO BRAND SPECIFIED) lancets standard To use to test glucose level in the blood Use to test blood sugar  3  times daily as directed. To accompany glucose monitor brands per insurance coverage. 200 each 1    blood glucose (NO BRAND SPECIFIED) test strip Use to test blood sugar 3 times daily or as directed. Please dispense insurance covered brand 300 strip 3    blood glucose (NO BRAND SPECIFIED) test strip To use to " test glucose level in the blood Use to test blood sugar  3 times daily as directed. To accompany glucose monitor brands per insurance coverage. 100 strip 3    blood glucose (ONE TOUCH SURESOFT) lancing device Lancing device to be used with lancets. 1 each 0    blood glucose monitoring (NO BRAND SPECIFIED) meter device kit Use to test blood sugar 3 times daily or as directed. Please dispense insurance covered brand 1 kit 0    blood glucose monitoring (NO BRAND SPECIFIED) meter device kit Use as directed Per insurance coverage 1 kit 0    blood glucose monitoring (ONE TOUCH ULTRA 2) meter device kit Use to test blood sugar 2 times daily or as directed. 1 kit 0    blood glucose monitoring (ONE TOUCH ULTRASOFT) lancets Use to test blood sugar 2 times daily. 100 each 6    diltiazem ER COATED BEADS (CARDIZEM CD/CARTIA XT) 120 MG 24 hr capsule Take 1 capsule (120 mg) by mouth daily 30 capsule 1    doxycycline hyclate (VIBRAMYCIN) 100 MG capsule Take 1 capsule (100 mg) by mouth 2 times daily 20 capsule 0    hydrALAZINE (APRESOLINE) 25 MG tablet Take 1 tablet (25 mg) by mouth 2 times daily 180 tablet 3    insulin glargine (LANTUS PEN) 100 UNIT/ML pen Inject 28 units subcutaneous at hs. 20 mL 3    insulin glargine (LANTUS SOLOSTAR) 100 UNIT/ML pen Inject 20 Units Subcutaneous at bedtime 15 mL 1    insulin pen needle (31G X 5 MM) 31G X 5 MM miscellaneous Use one  pen needles daily or as directed. 100 each 3    levofloxacin (LEVAQUIN) 500 MG tablet Take 1 tablet (500 mg) by mouth daily 3 tablet 0    levothyroxine (SYNTHROID/LEVOTHROID) 150 MCG tablet Take 1 tablet (150 mcg) by mouth daily 90 tablet 3    metFORMIN (GLUCOPHAGE XR) 500 MG 24 hr tablet Take 1,000 mg by mouth 2 times daily (with meals)      methocarbamol (ROBAXIN) 500 MG tablet Take 500 mg by mouth 4 times daily as needed for muscle spasms      mycophenolate (GENERIC EQUIVALENT) 250 MG capsule Take 2 capsules (500 mg) by mouth 2 times daily for 90 days 90 capsule 3     mycophenolate (GENERIC EQUIVALENT) 500 MG tablet Take 500 mg by mouth 2 times daily      predniSONE (DELTASONE) 5 MG tablet Take 1 tablet (5 mg) by mouth daily 90 tablet 1    sulfamethoxazole-trimethoprim (BACTRIM DS) 800-160 MG tablet Take 1 tablet by mouth 2 times daily 14 tablet 0    ursodiol (ACTIGALL) 250 MG tablet Take 1 tablet (250 mg) by mouth 2 times daily 180 tablet 3    Vitamin D3 (CHOLECALCIFEROL) 25 mcg (1000 units) tablet Take 2 tablets (50 mcg) by mouth daily 180 tablet 3     No current facility-administered medications for this visit.     No Known Allergies  Family History   Problem Relation Age of Onset    Memory loss Mother     Liver Disease Brother     Skin Cancer No family hx of     Melanoma No family hx of      Social History     Socioeconomic History    Marital status:      Spouse name: Not on file    Number of children: Not on file    Years of education: Not on file    Highest education level: Not on file   Occupational History    Not on file   Tobacco Use    Smoking status: Former     Current packs/day: 0.00     Average packs/day: (1.4 ttl pk-yrs)     Types: Cigarettes, Cigars     Start date: 1970     Quit date: 3/14/2018     Years since quittin.2    Smokeless tobacco: Never    Tobacco comments:     Quit smoking 2018, one cigarette after dinner   Vaping Use    Vaping status: Never Used   Substance and Sexual Activity    Alcohol use: No     Comment: Quit drinking 2018    Drug use: No    Sexual activity: Not on file   Other Topics Concern    Parent/sibling w/ CABG, MI or angioplasty before 65F 55M? Not Asked   Social History Narrative    Born in [place], came to [place] 30 years ago.     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cardboard, trimming meats     Social Determinants of Health     Financial Resource Strain: Not on file   Food Insecurity: Not on file   Transportation Needs: Not on file   Physical Activity: Not on file   Stress: Not on file   Social Connections: Not on file    Interpersonal Safety: Low Risk  (3/20/2024)    Interpersonal Safety     Do you feel physically and emotionally safe where you currently live?: Yes     Within the past 12 months, have you been hit, slapped, kicked or otherwise physically hurt by someone?: No     Within the past 12 months, have you been humiliated or emotionally abused in other ways by your partner or ex-partner?: No   Housing Stability: Not on file             Objective    BP (!) 150/85 (BP Location: Right arm, Patient Position: Sitting, Cuff Size: Adult Regular)   Pulse 69   Wt 88.6 kg (195 lb 4.8 oz)   SpO2 97%   BMI 28.93 kg/m    Body mass index is 28.93 kg/m .  Physical Exam   GENERAL: alert and no distress  RESP: lungs clear to auscultation - no rales, rhonchi or wheezes  CV: irregular rate and rhythm, normal S1 S2, no S3 or S4, no murmur, click or rub, no peripheral edema  ABDOMEN: soft, nontender, no hepatosplenomegaly, no masses and bowel sounds normal  MS: no gross musculoskeletal defects noted, no edema            Signed Electronically by: Jaswinder Anne MD

## 2024-06-10 ENCOUNTER — TELEPHONE (OUTPATIENT)
Dept: FAMILY MEDICINE | Facility: CLINIC | Age: 69
End: 2024-06-10
Payer: COMMERCIAL

## 2024-06-10 DIAGNOSIS — J18.9 PNEUMONIA: Primary | ICD-10-CM

## 2024-06-10 NOTE — TELEPHONE ENCOUNTER
Unable to reach pt for the patient to call back and schedule the following:    Appointment type: pulm referral  Provider: per PCP  Return date: any  Specialty phone number: [phone]

## 2024-06-11 ENCOUNTER — LAB (OUTPATIENT)
Dept: LAB | Facility: CLINIC | Age: 69
End: 2024-06-11
Payer: COMMERCIAL

## 2024-06-11 ENCOUNTER — OFFICE VISIT (OUTPATIENT)
Dept: INFECTIOUS DISEASES | Facility: CLINIC | Age: 69
End: 2024-06-11
Attending: INTERNAL MEDICINE
Payer: COMMERCIAL

## 2024-06-11 VITALS
SYSTOLIC BLOOD PRESSURE: 169 MMHG | TEMPERATURE: 98.6 F | HEART RATE: 76 BPM | DIASTOLIC BLOOD PRESSURE: 79 MMHG | BODY MASS INDEX: 28.3 KG/M2 | WEIGHT: 191.1 LBS | OXYGEN SATURATION: 95 %

## 2024-06-11 DIAGNOSIS — N39.0 URINARY TRACT INFECTION DUE TO EXTENDED-SPECTRUM BETA LACTAMASE (ESBL) PRODUCING ESCHERICHIA COLI: ICD-10-CM

## 2024-06-11 DIAGNOSIS — Z16.12 URINARY TRACT INFECTION DUE TO EXTENDED-SPECTRUM BETA LACTAMASE (ESBL) PRODUCING ESCHERICHIA COLI: ICD-10-CM

## 2024-06-11 DIAGNOSIS — R91.8 PULMONARY INFILTRATES: ICD-10-CM

## 2024-06-11 DIAGNOSIS — Z94.2 LUNG REPLACED BY TRANSPLANT (H): ICD-10-CM

## 2024-06-11 DIAGNOSIS — R05.2 SUBACUTE COUGH: ICD-10-CM

## 2024-06-11 DIAGNOSIS — R05.2 SUBACUTE COUGH: Primary | ICD-10-CM

## 2024-06-11 DIAGNOSIS — Z94.0 KIDNEY TRANSPLANTED: ICD-10-CM

## 2024-06-11 DIAGNOSIS — B96.29 URINARY TRACT INFECTION DUE TO EXTENDED-SPECTRUM BETA LACTAMASE (ESBL) PRODUCING ESCHERICHIA COLI: ICD-10-CM

## 2024-06-11 LAB
ALBUMIN SERPL BCG-MCNC: 3.7 G/DL (ref 3.5–5.2)
ALP SERPL-CCNC: 172 U/L (ref 40–150)
ALT SERPL W P-5'-P-CCNC: 16 U/L (ref 0–70)
ANION GAP SERPL CALCULATED.3IONS-SCNC: 7 MMOL/L (ref 7–15)
AST SERPL W P-5'-P-CCNC: 18 U/L (ref 0–45)
BACTERIA SPEC CULT: NORMAL
BASOPHILS # BLD AUTO: 0 10E3/UL (ref 0–0.2)
BASOPHILS NFR BLD AUTO: 0 %
BILIRUB SERPL-MCNC: 0.5 MG/DL
BUN SERPL-MCNC: 20.1 MG/DL (ref 8–23)
C PNEUM DNA SPEC QL NAA+PROBE: NOT DETECTED
CALCIUM SERPL-MCNC: 9.1 MG/DL (ref 8.8–10.2)
CHLORIDE SERPL-SCNC: 104 MMOL/L (ref 98–107)
CREAT SERPL-MCNC: 0.91 MG/DL (ref 0.67–1.17)
CRYPTOC AG SPEC QL: NEGATIVE
DEPRECATED HCO3 PLAS-SCNC: 26 MMOL/L (ref 22–29)
EGFRCR SERPLBLD CKD-EPI 2021: 90 ML/MIN/1.73M2
EOSINOPHIL # BLD AUTO: 0 10E3/UL (ref 0–0.7)
EOSINOPHIL NFR BLD AUTO: 0 %
ERYTHROCYTE [DISTWIDTH] IN BLOOD BY AUTOMATED COUNT: 14 % (ref 10–15)
FLUAV H1 2009 PAND RNA SPEC QL NAA+PROBE: NOT DETECTED
FLUAV H1 RNA SPEC QL NAA+PROBE: NOT DETECTED
FLUAV H3 RNA SPEC QL NAA+PROBE: NOT DETECTED
FLUAV RNA SPEC QL NAA+PROBE: NOT DETECTED
FLUBV RNA SPEC QL NAA+PROBE: NOT DETECTED
GLUCOSE SERPL-MCNC: 235 MG/DL (ref 70–99)
GRAM STAIN RESULT: NORMAL
HADV DNA SPEC QL NAA+PROBE: NOT DETECTED
HCOV PNL SPEC NAA+PROBE: NOT DETECTED
HCT VFR BLD AUTO: 40.9 % (ref 40–53)
HGB BLD-MCNC: 13.3 G/DL (ref 13.3–17.7)
HMPV RNA SPEC QL NAA+PROBE: NOT DETECTED
HPIV1 RNA SPEC QL NAA+PROBE: NOT DETECTED
HPIV2 RNA SPEC QL NAA+PROBE: NOT DETECTED
HPIV3 RNA SPEC QL NAA+PROBE: NOT DETECTED
HPIV4 RNA SPEC QL NAA+PROBE: NOT DETECTED
IMM GRANULOCYTES # BLD: 0 10E3/UL
IMM GRANULOCYTES NFR BLD: 0 %
LYMPHOCYTES # BLD AUTO: 1.1 10E3/UL (ref 0.8–5.3)
LYMPHOCYTES NFR BLD AUTO: 15 %
M PNEUMO DNA SPEC QL NAA+PROBE: NOT DETECTED
MCH RBC QN AUTO: 27.7 PG (ref 26.5–33)
MCHC RBC AUTO-ENTMCNC: 32.5 G/DL (ref 31.5–36.5)
MCV RBC AUTO: 85 FL (ref 78–100)
MONOCYTES # BLD AUTO: 0.2 10E3/UL (ref 0–1.3)
MONOCYTES NFR BLD AUTO: 3 %
NEUTROPHILS # BLD AUTO: 6.1 10E3/UL (ref 1.6–8.3)
NEUTROPHILS NFR BLD AUTO: 82 %
NRBC # BLD AUTO: 0 10E3/UL
NRBC BLD AUTO-RTO: 0 /100
PLATELET # BLD AUTO: 270 10E3/UL (ref 150–450)
POTASSIUM SERPL-SCNC: 4.9 MMOL/L (ref 3.4–5.3)
PROT SERPL-MCNC: 6.8 G/DL (ref 6.4–8.3)
RBC # BLD AUTO: 4.81 10E6/UL (ref 4.4–5.9)
RSV RNA SPEC QL NAA+PROBE: NOT DETECTED
RSV RNA SPEC QL NAA+PROBE: NOT DETECTED
RV+EV RNA SPEC QL NAA+PROBE: NOT DETECTED
SODIUM SERPL-SCNC: 137 MMOL/L (ref 135–145)
WBC # BLD AUTO: 7.4 10E3/UL (ref 4–11)

## 2024-06-11 PROCEDURE — 87633 RESP VIRUS 12-25 TARGETS: CPT | Performed by: INTERNAL MEDICINE

## 2024-06-11 PROCEDURE — 87116 MYCOBACTERIA CULTURE: CPT | Mod: 90 | Performed by: PATHOLOGY

## 2024-06-11 PROCEDURE — 99417 PROLNG OP E/M EACH 15 MIN: CPT | Performed by: INTERNAL MEDICINE

## 2024-06-11 PROCEDURE — G0463 HOSPITAL OUTPT CLINIC VISIT: HCPCS | Performed by: INTERNAL MEDICINE

## 2024-06-11 PROCEDURE — 80053 COMPREHEN METABOLIC PANEL: CPT | Performed by: PATHOLOGY

## 2024-06-11 PROCEDURE — 87305 ASPERGILLUS AG IA: CPT | Mod: 90 | Performed by: PATHOLOGY

## 2024-06-11 PROCEDURE — 87385 HISTOPLASMA CAPSUL AG IA: CPT | Mod: 90 | Performed by: PATHOLOGY

## 2024-06-11 PROCEDURE — 99000 SPECIMEN HANDLING OFFICE-LAB: CPT | Performed by: PATHOLOGY

## 2024-06-11 PROCEDURE — 87581 M.PNEUMON DNA AMP PROBE: CPT | Performed by: INTERNAL MEDICINE

## 2024-06-11 PROCEDURE — 87205 SMEAR GRAM STAIN: CPT | Performed by: INTERNAL MEDICINE

## 2024-06-11 PROCEDURE — 87385 HISTOPLASMA CAPSUL AG IA: CPT | Performed by: INTERNAL MEDICINE

## 2024-06-11 PROCEDURE — 86359 T CELLS TOTAL COUNT: CPT | Performed by: INTERNAL MEDICINE

## 2024-06-11 PROCEDURE — 87899 AGENT NOS ASSAY W/OPTIC: CPT | Performed by: INTERNAL MEDICINE

## 2024-06-11 PROCEDURE — 36415 COLL VENOUS BLD VENIPUNCTURE: CPT | Performed by: PATHOLOGY

## 2024-06-11 PROCEDURE — 99215 OFFICE O/P EST HI 40 MIN: CPT | Performed by: INTERNAL MEDICINE

## 2024-06-11 PROCEDURE — 85025 COMPLETE CBC W/AUTO DIFF WBC: CPT | Performed by: PATHOLOGY

## 2024-06-11 PROCEDURE — G2211 COMPLEX E/M VISIT ADD ON: HCPCS | Performed by: INTERNAL MEDICINE

## 2024-06-11 ASSESSMENT — PAIN SCALES - GENERAL: PAINLEVEL: NO PAIN (0)

## 2024-06-11 NOTE — NURSING NOTE
Chief Complaint   Patient presents with    Follow Up     BP (!) 169/79   Pulse 76   Temp 98.6  F (37  C) (Oral)   Wt 86.7 kg (191 lb 1.6 oz)   SpO2 95%   BMI 28.30 kg/m    Bonifacio England MA on 6/11/2024 at 1:37 PM

## 2024-06-11 NOTE — LETTER
6/11/2024       RE: Loy Limon  [address]  [facility] [address]     Dear Colleague,    Thank you for referring your patient, Loy Limon, to the [facility] INFECTIOUS DISEASE CLINIC [place] at [facility]. Please see a copy of my visit note below.     [facility]    Division of Infectious Diseases and International Medicine    TRANSPLANT INFECTIOUS DISEASE INPATIENT CONSULTATION NOTE   Patient:  Loy Limon, Date of birth 1955, Medical record number [id number]  Referred by: No ref. provider found   Reason for Consult: Cough, GGOs on CT chest        Assessment and Recommendations   Recommendations  Pulmonary referral has been placed, given ongoing GGOs seen on CT chest and cough.   Working with pulmonary to get him a more rapid clinic visit than September.   I reviewed his CT scan with pulmonary, who agrees that he will likely need a BAL.   Obtain serum aspergillus galactomannan, cryptococcal antigen, 1,3 BD glucan, serum/urine histoplasma, and RVP today   I have sent for sputum cultures and AFB x1 to start the work-up. He will try to give this to us today, but if he cannot will return to a lab with the specimen in the coming days.   Will send CD4 to ensure he is not at increased risk for PJP. IgG >1000 at last check (4/2024)  He can complete his course of Augmentin, since he feels that his symptoms have improved since starting this.   Note: would not prescribe any additional antibacterials after this, since he already received a course of doxycycline without improvement.   Will need to discuss PCV-20 and RSV vaccines once acute issues have resolved.     Follow-up with me in ~4-6 weeks for ongoing work-up of pulmonary infiltrates    Alessandra Capone MD  [phone]    I spent >60 minutes reviewing the patient's medical record, examining the patient, documenting and coordinating care.     The longitudinal  plan of care for his pulmonary infiltrates in setting of previous renal transplant as documented were addressed during this visit. Due to the added complexity in care, I will continue to support Loy in the subsequent management and with ongoing continuity of care.    Assessment  Mr. 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 I am seeing him in follow-up today for recent hospital admission (4/13/2024) for FUO and for ongoing work-up of GGOs/nodules seen on CT chest.      Recent admission for unexplained fevers, sepsis (4/12-4/17/24)  Admitted from 4/12-4/17 with sepsis of unclear etiology. CT C/A/P did show some lower lobe peribronchovascular GGOs, but these were actually improved from CT chest on 3/22/24. UA relatively unremarkable and urine culture with mixed urogenital luc. Work-up included negative blood cultures, urine culture. Ultimately, improved with 7 days of antibiotics (CTX--> levofloxacin). Plan was to do Karius if fevers recurred.     Cough, GGOs seen on CT chest (6/6/2024)  CT chest showing patchy bilateral groundglass opacities with lower lobe dominant starting on 3/3/2024.  Prior to this, he did have a CT of his abdomen and pelvis 3/2023 that showed no evidence of lower lobe abnormalities.  Repeat CT chest on 3/22/2024 with improving perihilar interstitial groundglass opacities.  CT chest from 4/14/2024 showed continued peribronchovascular GGO's.  His most recent CT chest from 5/31/2024 now shows left upper lobe predominant peribronchovascular groundglass opacities.  He did undergo BAL on 3/4/2024, which had negative galactomannan, PJP, and all cultures including nocardia and AFB.  He also had sputum AFB x 3 during the hospital admission, all of which were negative. Of note, he does have a history of LTBI, but completed treatment in 2019. Thus,  he is low risk for recurrent tuberculosis.      At this point he has been referred to pulmonary for ongoing evaluation of these involving groundglass opacities.  I discussed his case with the pulmonary team, who agrees he need expedited work-up with likely BAL. In the meantime, he can complete his course of Augmentin, since he feels that he has improved symptoms on this. I will send serum aspergillus galactomannan, cryptococcal antigen, urine/serum histo, Fungitell, RVP. I will also send for a sputum culture with AFB x1 while awaiting pulmonary consult.     Other Infectious Disease issues include:  - QTc interval: 502 (24)  - Bacterial prophylaxis: None  - Pneumocystis prophylaxis: Not applicable  - Serostatus & viral prophylaxis: CMV D-/R+; EBV D+/R+   - Fungal prophylaxis: Not applicable  - Isolation status: Contact (ESBL history)  - Ig,143 (2024)  - Code status: Full Code  - Antibiotic allergies: None     Recent Labs   Lab Test 24  2117   IGG 1,143      Prior infectious diseases issues   + Quant 2018. Was initially started on [id number]/kg PO x4 months on 2018 (end date ~2024) but only completed 2.5 months worth. On ID clinic visit with Dr Clancy on 2019, was transitioned over to INH 300mg daily (due to rifampin DDI with transplant immunosuppressants) which he took for an additional ~2 months with end date on ~2019.   ESBL E coli Pyelonephritis: Admitted from -3/9/24 with ESBL E coli pyelonephritis. Treated with 14 days of ertapenem (3/1/2024-3/14/2024).    CAP: Occurred during -3/9/24 admission. BAL negative for fungal etiology. Given azithro x3 days and ertapenem (see above). AFB x3 negative.        Interval events    Since last seen by me in the hospital on 24, he was not readmitted to the hospital.   Saw his PCP on  noting some cough and yellow phlegm.  Given 10-day course of doxycycline.   CT chest from 2024 with left upper lobe predominant  peribronchovascular groundglass opacities and consolidative opacities with concern for ongoing infectious/inflammatory process.  No RVP testing done.  On 2024 followed up with PCP, where he complained of worsening cough.  He was started on a 10-day course of Augmentin for possible pneumonia.  He started these about 3 days previously. Pulmonary consult placed.    Currently he continues to have productive cough associated with chest pain/pressure. Cough productive of green sputum. No blood in sputum. No fevers/chills. No other URI symptoms. Feels like this has improved while on the Augmentin.     Abx:   Doxycycline: -  Augmentin: ~-current       Other Medical History:     Attempt was made to collect past, family and social history during this encounter,  this information was reviewed with the patient and updated    Allergies Patient has no known allergies.    Past Medical History  Past Medical History:   Diagnosis Date    Anemia     Biliary stricture of transplanted liver (H) 2018    BPH (benign prostatic hyperplasia)     Cancer (H)     hepatocellualr carcinoma    Cirrhosis of liver (H) 2018    Diabetes (H)     Enlarged prostate     Hypothyroidism     Impotence of organic origin 2020    Inguinal hernia     Repaired with mesh on 18    Liver lesion 2018    Liver transplanted (H) 2018     donor liver transplant    Portal vein thrombosis     on path explant    Postoperative atrial fibrillation (H) 2018    Prostate cancer (H)     TB lung, latent     Treated     PastSurgical History   has a past surgical history that includes appendectomy; colonoscopy; Transplant liver recipient  donor (N/A, 2018); Herniorrhaphy inguinal (2018); Endoscopic retrograde cholangiopancreatogram (N/A, 2018); IR Liver Biopsy Percutaneous (2018); Endoscopic retrograde cholangiopancreatogram (2019); Endoscopic retrograde cholangiopancreatogram (N/A,  04/29/2019); Laparotomy exploratory; Davinci prostatectomy (N/A, 01/03/2020); Coronary Angiogram (N/A, 10/13/2021); Instantaneous Wave-Free Ratio (N/A, 10/13/2021); Percutaneous Coronary Intervention Stent (N/A, 10/13/2021); Intravascular Ultrasound (N/A, 10/13/2021); Percutaneous Coronary Intervention - Lithotripsy (N/A, 10/13/2021); Bronchoscopy (rigid or flexible), diagnostic (N/A, 03/05/2024); and picc insertion (Right, 03/06/2024).   Family History  Family History   Problem Relation Age of Onset    Memory loss Mother     Liver Disease Brother     Skin Cancer No family hx of     Melanoma No family hx of     Social History  He reports that he quit smoking about 6 years ago. His smoking use included cigarettes and cigars. He started smoking about 53 years ago. He has a 1.4 pack-year smoking history. He has never used smokeless tobacco. He reports that he does not drink alcohol and does not use drugs.   Notable Exposures Listed below if pertinent    Vaccination History:  Immunization History   Administered Date(s) Administered    COVID-19 12+ (2023-24) (Pfizer) 03/20/2024    COVID-19 Bivalent 12+ (Pfizer) 10/26/2022, 05/09/2023    COVID-19 Monovalent 18+ (Moderna) 03/01/2021, 03/30/2021, 09/21/2021    COVID-19 Monovalent Booster 18+ (Moderna) 04/26/2022    DTaP, Unspecified 08/21/2018    Influenza (High Dose) 3 valent vaccine 10/24/2018, 10/16/2019    Influenza (IIV3) PF 11/15/2000    Influenza Vaccine 65+ (Fluzone HD) 09/25/2020, 10/21/2021, 10/26/2022, 03/20/2024    Pneumo Conj 13-V (2010&after) 08/21/2018, 01/13/2020    Pneumococcal 23 valent 12/04/2018    RSV Vaccine (Arexvy) 04/23/2024    TD,PF 7+ (Tenivac) 11/15/2000    TDAP (Adacel,Boostrix) 08/21/2018    TDAP Vaccine (Boostrix) 08/21/2018    Zoster recombinant adjuvanted (SHINGRIX) 08/21/2018, 10/24/2018             Physical Exam:     VITAL SIGNS:  There were no vitals taken for this visit.     GENERAL APPEARANCE: Not in acute distress. Lying in bed.      PHYSICAL EXAM:   Eyes:     no ptosis, no discharge, no scleral icterus  Mouth, Throat:     mucous membranes moist  Cardiovascular:    Inspection: No Cyanosis, JVD not elevated   Auscultation:  S1, S2 normal, regular rate and rhythm  Respiratory:     Inspection: Not in respiratory distress, Chest expansion symmetrical   Auscultation: 4 point auscultation done clear to auscultation bilaterally, no wheezes, no rales, and no rhonchi  Skin:     Dry and intact   No rashes  Neurologic:     Higher Mental Function: Conversant, AOx4   Facial asymmetry grossly absent   is ambulatory  Psychiatric:     Appropriate           Laboratory Data:   Metabolic Studies       Recent Labs   Lab Test 05/21/24  1633 05/02/24  1348 04/16/24  1011 04/16/24  0527 04/15/24  0959 04/15/24  0439 04/14/24  1253 04/14/24  1134 04/14/24  0840 09/08/23  2201 09/08/23  1253    135   < > 130*  --  135  --  133*  --    < > 142   POTASSIUM 4.6 5.1   < > 4.0  --  4.2  --  4.2  --    < > 6.0*   CHLORIDE 105 102   < > 99  --  101  --  103  --    < > 107   CO2 24 23   < > 21*  --  22  --  19*  --    < > 25   ANIONGAP 8 10   < > 10  --  12  --  11  --    < > 10   BUN 24.2* 17.2   < > 17.3  --  13.5  --  15.9  --    < > 23.9*   CR 1.26* 0.97   < > 0.96  --  0.94  --  0.89  --    < > 0.98   GFRESTIMATED 61 84   < > 85  --  87  --  >90  --    < > 83   * 404*   < > 285*   < > 149*   < > 304*  --    < > 217*   A1C  --   --   --  8.4*  --   --   --   --   --    < >  --    YELENA 8.7* 8.7*   < > 8.2*  --  8.7*  --  8.6*  --    < > 9.3   PHOS  --   --   --   --   --   --   --   --   --   --  3.4   MAG  --  1.7  --   --   --   --   --   --   --    < >  --    LACT  --   --   --   --   --  1.6  --   --  0.9   < >  --    PCAL  --   --   --   --   --   --   --  30.80*  --    < >  --    FGTL  --   --   --   --   --   --   --  <31  --    < >  --     < > = values in this interval not displayed.       Hepatic Studies    Recent Labs   Lab Test 05/21/24  5817  "05/02/24  1348 04/17/24  0607 03/20/24  1409 03/08/24  0526 03/05/24  0554 03/04/24  0626   BILITOTAL 0.4 0.6 0.3   < > 0.3   < > 0.3   DBIL  --   --   --   --  <0.20   < > <0.20   ALKPHOS 221* 209* 163*   < > 249*   < > 308*   PROTTOTAL 6.6 6.6 5.8*   < > 5.5*   < > 5.5*   ALBUMIN 3.5 3.4* 3.0*   < > 2.6*   < > 2.4*   AST 18 15 20   < > 18   < > 31   ALT 24 18 18   < > 19   < > 30   LDH  --   --   --   --   --   --  179    < > = values in this interval not displayed.       Hematology Studies      Recent Labs   Lab Test 05/21/24  1633 05/02/24  1348 04/17/24  0607 04/16/24  0527 04/15/24  0439 04/14/24  1134 07/21/21  1804 07/07/21  1059 03/12/19  0815 03/04/19  1218   WBC 7.4 6.6 5.2 4.7 6.0 8.6   < > 7.0   < > 3.6*   ANEU  --   --   --   --   --   --   --  4.6  --  2.3   ALYM  --   --   --   --   --   --   --  1.8  --  1.0   KM  --   --   --   --   --   --   --  0.4  --  0.2   AEOS  --   --   --   --   --   --   --  0.2  --  0.1   HGB 12.2* 12.0* 12.4* 12.4* 13.8 13.3   < > 14.0   < > 11.2*   HCT 37.9* 37.4* 37.2* 37.3* 42.8 40.9   < > 40.1   < > 34.4*    287 188 166 167 164   < > 208   < > 199    < > = values in this interval not displayed.       Arterial Blood Gas Testing    Recent Labs   Lab Test 04/12/24  2131 12/23/18 0404 12/22/18 2126 12/22/18 2010 12/22/18  1914 12/22/18  1700   PH  --  7.41 7.43 7.41 7.42 7.36   PCO2  --  41 41 42 39 41   PO2  --  71* 92 221* 247* 184*   HCO3  --  26 27 26 25 23   O2PER 21 40 40 75 75 75        Medication levels    Recent Labs   Lab Test 04/15/24  1541 03/23/22  0720   VANCOMYCIN 10.5  --    TACROL  --  <1.0*       No results found for: \"ACD4\"    Inflammatory Markers    Recent Labs   Lab Test 09/08/23  1253   PSA <0.01       Immune Globulin Studies     Recent Labs   Lab Test 04/12/24  2117 03/24/21  0812   IGG 1,143 1,064   IGM  --  39   IGA  --  324       Pancreatitis testing    Recent Labs   Lab Test 04/13/24  0029 07/19/23  1411 07/10/19  0909 " "03/04/19  1218 02/18/19  0742 12/23/18  0404 12/22/18  0013   AMYLASE  --   --   --  44 38 46 67   LIPASE 19  --   --  42* 35* 93  --    TRIG  --  170*   < >  --   --   --   --     < > = values in this interval not displayed.       Gout Labs    No lab results found.    Clotting Studies    Recent Labs   Lab Test 04/13/24  0557 04/12/24  2117 02/29/24  1004 02/29/24  1004 03/11/23  0434   INR 1.21* 1.07  --  1.08 0.92   PTT  --  26   < > 43*  --     < > = values in this interval not displayed.         Markers    Recent Labs   Lab Test 03/07/22  1331 09/03/21  1757 02/01/21  0723 09/25/20  0820 07/03/20  1109 03/04/19  1218 10/03/18  0923 09/18/18  1524 08/21/18  1050 07/19/18  1132 06/18/18  0747 05/29/18  0754   FETO <1.5 <1.5 1.7 <1.5 <1.5 2.5 5.9 4.4 4.5 4.9 3.8 4.0       Autoimmune Testing  No lab results found.    Invalid input(s): \"PRO3\", \"C3\", \"C4\", \"VASPAN\"    Thyroid Studies     Recent Labs   Lab Test 05/02/24  1348 04/13/24  0029 03/02/24  0751 09/08/23  1253 06/14/23  1556   TSH 7.75* 0.32 5.47* 2.17 4.38*   T4 0.96  --  1.24  --  1.18       Urine Studies     Recent Labs   Lab Test 04/12/24  2134 03/20/24  1413 02/28/24  2035 09/08/23  2306 07/19/23  1427   URINEPH 6.0 5.5 5.5 5.5 6.0   NITRITE Negative Negative Negative Negative Negative   LEUKEST Small* Small* Small* Negative Negative   WBCU 79* 40* 49* 1 1       CSF testing   No lab results found.    Invalid input(s): \"CADAM\", \"EVPCR\", \"ENTPCR\", \"ENTEROVIRUS\"    Body fluid stats    Recent Labs   Lab Test 03/05/24  1131 12/25/18  0719   FCOL Colorless  --    FAPR Clear  --    FWBC 125  --    FNEU 6  --    FLYM 7  --    FMONO 87  --    GS  --  >10 Squamous epithelial cells/low power field indicates oral contamination. Please   recollect.  *  <25 PMNs/low power field  Moderate  Mixed gram positive luc         Microbiology:  Fungal testing  Recent Labs   Lab Test 04/14/24  1134 03/05/24  1131 03/04/24  1129   FGTL <31  --  <31   ASPGAI  --  0.14 0.05 " "  ASPAG  --  Negative  --    ASPGAA  --   --  Negative   COFUNG  --   --  <1:2       Culture   Date Value Ref Range Status   04/14/2024 No Growth  Final   04/14/2024 No Growth  Final   04/12/2024 >100,000 CFU/mL Mixture of Urogenital Luc  Final   04/12/2024 No Growth  Final   04/12/2024 No Growth  Final   03/20/2024 10,000-50,000 CFU/mL Escherichia coli ESBL (A)  Final   03/05/2024 No Legionella species isolated  Final   03/05/2024 No Growth  Final   03/05/2024 No Growth  Final   03/05/2024 1+ Normal luc  Final   02/29/2024 3+ Normal luc  Final   02/28/2024 No Growth  Final   02/28/2024 No Growth  Final   02/28/2024 50,000-100,000 CFU/mL Escherichia coli ESBL (A)  Final   03/11/2023 >100,000 CFU/mL Escherichia coli (A)  Final     Culture Micro   Date Value Ref Range Status   02/18/2020   Final    <10,000 colonies/mL  urogenital luc  Susceptibility testing not routinely done     12/16/2019 <10,000 colonies/mL  urogenital luc    Final   12/16/2019 Susceptibility testing not routinely done  Final   10/10/2019 No growth  Final   12/25/2018 (A)  Final    Canceled, Test credited  >10 Squamous epithelial cells/low power field indicates oral contamination. Please   recollect.     12/25/2018   Final    Notification of test cancellation was given to  Tahira Reis RN at 1000 12.25.18     12/24/2018 No growth  Final   12/24/2018 No growth  Final   12/22/2018 No anaerobes isolated  Final   12/22/2018 No growth  Final   12/22/2018 Culture negative after 30 days  Final   12/22/2018 No VRE isolated  Final   (    Last check of C difficile  No results found for: \"CDBPCT\"    Infection Studies to assess Diarrhea No lab results found.    Virology:  Coronavirus-19 testing    Recent Labs   Lab Test 04/12/24 2330 02/28/24 2039 10/11/21  0806 10/01/21  1141   [id number] Negative Negative Negative Negative       Respiratory virus testing    Recent Labs   Lab Test 04/12/24 2330 03/05/24  1131 02/29/24  1000   IFLUA Not Detected " Not Detected Not Detected   INFZA Negative  --   --    FLUAH1 Not Detected Not Detected Not Detected   OF1515 Not Detected Not Detected Not Detected   FLUAH3 Not Detected Not Detected Not Detected   IFLUB Not Detected Not Detected Not Detected   INFZB Negative  --   --    PIV1 Not Detected Not Detected Not Detected   PIV2 Not Detected Not Detected Not Detected   PIV3 Not Detected Not Detected Not Detected   PIV4 Not Detected Not Detected Not Detected   IRSV Negative  --   --    RSVA Not Detected Not Detected Not Detected   RSVB Not Detected Not Detected Not Detected   HMPV Not Detected Not Detected Not Detected   ADENOV Not Detected Not Detected Not Detected   CORONA Not Detected Not Detected Not Detected       CMV Antibody IgG   Date Value Ref Range Status   12/22/2018 >8.0 (H) 0.0 - 0.8 AI Final     Comment:     Positive  Antibody index (AI) values reflect qualitative changes in antibody   concentration that cannot be directly associated with clinical condition or   disease state.     07/19/2018 >8.0 (H) 0.0 - 0.8 AI Final     Comment:     Positive  Antibody index (AI) values reflect qualitative changes in antibody   concentration that cannot be directly associated with clinical condition or   disease state.       CMV Antibody IgM   Date Value Ref Range Status   12/22/2018 <0.2 0.0 - 0.8 AI Final     Comment:     Negative  Antibody index (AI) values reflect qualitative changes in antibody   concentration that cannot be directly associated with clinical condition or   disease state.       EBV Capsid Antibody IgG   Date Value Ref Range Status   12/22/2018 >8.0 (H) 0.0 - 0.8 AI Final     Comment:     Positive, suggests recent or past exposure  Antibody index (AI) values reflect qualitative changes in antibody   concentration that cannot be directly associated with clinical condition or   disease state.     07/19/2018 7.7 (H) 0.0 - 0.8 AI Final     Comment:     Positive, suggests recent or past exposure  Antibody  "index (AI) values reflect qualitative changes in antibody   concentration that cannot be directly associated with clinical condition or   disease state.       Toxoplasma Antibody IgG   Date Value Ref Range Status   09/18/2018 <3.0 0.0 - 7.1 IU/mL Final     Comment:     Negative- Absence of detectable Toxoplasma gondii IgG antibodies. A negative   result does not rule out acute infection.  The magnitude of the measured result is not indicative of the amount of   antibody present. The concentrations of anti-Toxoplasma gondii IgG in a given   specimen determined with assays from different manufacturers can vary due to   differences in assay methods and reagent specificity.       EBV Capsid Antibody IgM   Date Value Ref Range Status   12/22/2018 <0.2 0.0 - 0.8 AI Final     Comment:     No detectable antibody.  Antibody index (AI) values reflect qualitative changes in antibody   concentration that cannot be directly associated with clinical condition or   disease state.         CMV viral loads    Recent Labs   Lab Test 04/14/24  1256   CMVQNT Not Detected       CMV resistance testing  No lab results found.    No results found for: \"H6RES\"    No results found for: \"EBRES\"    No results found for: \"83543\", \"BKRES\"    Parvovirus Testing  No lab results found.    Invalid input(s): \"PRVRES\"    Adenovirus Testing  No lab results found.    Invalid input(s): \"ADENAB\", \"ADENOVIRUS\", \"ADQT\"    Hepatitis B Testing     Recent Labs   Lab Test 03/20/24  1409 12/22/18  0013   AUSAB  --  67.96*   HBCAB  --  Reactive*   HEPBANG Nonreactive  --         Hepatitis C Antibody   Date Value Ref Range Status   12/22/2018 Nonreactive NR^Nonreactive Final     Comment:     Assay performance characteristics have not been established for newborns,   infants, and children         Imaging:  No results found for this or any previous visit (from the past 48 hour(s)).         Alessandra Capone MD    "

## 2024-06-11 NOTE — PROGRESS NOTES
[facility]    Division of Infectious Diseases and International Medicine    TRANSPLANT INFECTIOUS DISEASE INPATIENT CONSULTATION NOTE   Patient:  Loy Limon, Date of birth 1955, Medical record number [id number]  Referred by: No ref. provider found   Reason for Consult: Cough, GGOs on CT chest        Assessment and Recommendations   Recommendations  Pulmonary referral has been placed, given ongoing GGOs seen on CT chest and cough.   Working with pulmonary to get him a more rapid clinic visit than September.   I reviewed his CT scan with pulmonary, who agrees that he will likely need a BAL.   Obtain serum aspergillus galactomannan, cryptococcal antigen, 1,3 BD glucan, serum/urine histoplasma, and RVP today   I have sent for sputum cultures and AFB x1 to start the work-up. He will try to give this to us today, but if he cannot will return to a lab with the specimen in the coming days.   Will send CD4 to ensure he is not at increased risk for PJP. IgG >1000 at last check (4/2024)  He can complete his course of Augmentin, since he feels that his symptoms have improved since starting this.   Note: would not prescribe any additional antibacterials after this, since he already received a course of doxycycline without improvement.   Will need to discuss PCV-20 and RSV vaccines once acute issues have resolved.     Follow-up with me in ~4-6 weeks for ongoing work-up of pulmonary infiltrates    Alessandra Capone MD  [phone]    I spent >60 minutes reviewing the patient's medical record, examining the patient, documenting and coordinating care.     The longitudinal plan of care for his pulmonary infiltrates in setting of previous renal transplant as documented were addressed during this visit. Due to the added complexity in care, I will continue to support Loy in the subsequent management and with ongoing continuity of care.    Assessment  Mr. 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 I am seeing him in follow-up today for recent hospital admission (4/13/2024) for FUO and for ongoing work-up of GGOs/nodules seen on CT chest.      Recent admission for unexplained fevers, sepsis (4/12-4/17/24)  Admitted from 4/12-4/17 with sepsis of unclear etiology. CT C/A/P did show some lower lobe peribronchovascular GGOs, but these were actually improved from CT chest on 3/22/24. UA relatively unremarkable and urine culture with mixed urogenital luc. Work-up included negative blood cultures, urine culture. Ultimately, improved with 7 days of antibiotics (CTX--> levofloxacin). Plan was to do Karius if fevers recurred.     Cough, GGOs seen on CT chest (6/6/2024)  CT chest showing patchy bilateral groundglass opacities with lower lobe dominant starting on 3/3/2024.  Prior to this, he did have a CT of his abdomen and pelvis 3/2023 that showed no evidence of lower lobe abnormalities.  Repeat CT chest on 3/22/2024 with improving perihilar interstitial groundglass opacities.  CT chest from 4/14/2024 showed continued peribronchovascular GGO's.  His most recent CT chest from 5/31/2024 now shows left upper lobe predominant peribronchovascular groundglass opacities.  He did undergo BAL on 3/4/2024, which had negative galactomannan, PJP, and all cultures including nocardia and AFB.  He also had sputum AFB x 3 during the hospital admission, all of which were negative. Of note, he does have a history of LTBI, but completed treatment in 2019. Thus, he is low risk for recurrent tuberculosis.      At this point he has been referred to pulmonary for ongoing evaluation of these involving groundglass opacities.  I discussed his case with the pulmonary team, who agrees he need expedited work-up with likely BAL. In the meantime, he can complete his course of Augmentin,  since he feels that he has improved symptoms on this. I will send serum aspergillus galactomannan, cryptococcal antigen, urine/serum histo, Fungitell, RVP. I will also send for a sputum culture with AFB x1 while awaiting pulmonary consult.     Other Infectious Disease issues include:  - QTc interval: 502 (24)  - Bacterial prophylaxis: None  - Pneumocystis prophylaxis: Not applicable  - Serostatus & viral prophylaxis: CMV D-/R+; EBV D+/R+   - Fungal prophylaxis: Not applicable  - Isolation status: Contact (ESBL history)  - Ig,143 (2024)  - Code status: Full Code  - Antibiotic allergies: None     Recent Labs   Lab Test 24  2117   IGG 1,143      Prior infectious diseases issues   + Quant 2018. Was initially started on [id number]/kg PO x4 months on 2018 (end date ~2024) but only completed 2.5 months worth. On ID clinic visit with Dr Clancy on 2019, was transitioned over to INH 300mg daily (due to rifampin DDI with transplant immunosuppressants) which he took for an additional ~2 months with end date on ~2019.   ESBL E coli Pyelonephritis: Admitted from -3/9/24 with ESBL E coli pyelonephritis. Treated with 14 days of ertapenem (3/1/2024-3/14/2024).    CAP: Occurred during -3/9/24 admission. BAL negative for fungal etiology. Given azithro x3 days and ertapenem (see above). AFB x3 negative.        Interval events    Since last seen by me in the hospital on 24, he was not readmitted to the hospital.   Saw his PCP on  noting some cough and yellow phlegm.  Given 10-day course of doxycycline.   CT chest from 2024 with left upper lobe predominant peribronchovascular groundglass opacities and consolidative opacities with concern for ongoing infectious/inflammatory process.  No RVP testing done.  On 2024 followed up with PCP, where he complained of worsening cough.  He was started on a 10-day course of Augmentin for possible pneumonia.  He started these about  3 days previously. Pulmonary consult placed.    Currently he continues to have productive cough associated with chest pain/pressure. Cough productive of green sputum. No blood in sputum. No fevers/chills. No other URI symptoms. Feels like this has improved while on the Augmentin.     Abx:   Doxycycline: -  Augmentin: ~-current       Other Medical History:     Attempt was made to collect past, family and social history during this encounter,  this information was reviewed with the patient and updated    Allergies Patient has no known allergies.    Past Medical History  Past Medical History:   Diagnosis Date    Anemia     Biliary stricture of transplanted liver (H) 2018    BPH (benign prostatic hyperplasia)     Cancer (H)     hepatocellualr carcinoma    Cirrhosis of liver (H) 2018    Diabetes (H)     Enlarged prostate     Hypothyroidism     Impotence of organic origin 2020    Inguinal hernia     Repaired with mesh on 18    Liver lesion 2018    Liver transplanted (H) 2018     donor liver transplant    Portal vein thrombosis     on path explant    Postoperative atrial fibrillation (H) 2018    Prostate cancer (H)     TB lung, latent     Treated     PastSurgical History   has a past surgical history that includes appendectomy; colonoscopy; Transplant liver recipient  donor (N/A, 2018); Herniorrhaphy inguinal (2018); Endoscopic retrograde cholangiopancreatogram (N/A, 2018); IR Liver Biopsy Percutaneous (2018); Endoscopic retrograde cholangiopancreatogram (2019); Endoscopic retrograde cholangiopancreatogram (N/A, 2019); Laparotomy exploratory; Davinci prostatectomy (N/A, 2020); Coronary Angiogram (N/A, 10/13/2021); Instantaneous Wave-Free Ratio (N/A, 10/13/2021); Percutaneous Coronary Intervention Stent (N/A, 10/13/2021); Intravascular Ultrasound (N/A, 10/13/2021); Percutaneous Coronary Intervention - Lithotripsy  (N/A, 10/13/2021); Bronchoscopy (rigid or flexible), diagnostic (N/A, 03/05/2024); and picc insertion (Right, 03/06/2024).   Family History  Family History   Problem Relation Age of Onset    Memory loss Mother     Liver Disease Brother     Skin Cancer No family hx of     Melanoma No family hx of     Social History  He reports that he quit smoking about 6 years ago. His smoking use included cigarettes and cigars. He started smoking about 53 years ago. He has a 1.4 pack-year smoking history. He has never used smokeless tobacco. He reports that he does not drink alcohol and does not use drugs.   Notable Exposures Listed below if pertinent    Vaccination History:  Immunization History   Administered Date(s) Administered    COVID-19 12+ (2023-24) (Pfizer) 03/20/2024    COVID-19 Bivalent 12+ (Pfizer) 10/26/2022, 05/09/2023    COVID-19 Monovalent 18+ (Moderna) 03/01/2021, 03/30/2021, 09/21/2021    COVID-19 Monovalent Booster 18+ (Moderna) 04/26/2022    DTaP, Unspecified 08/21/2018    Influenza (High Dose) 3 valent vaccine 10/24/2018, 10/16/2019    Influenza (IIV3) PF 11/15/2000    Influenza Vaccine 65+ (Fluzone HD) 09/25/2020, 10/21/2021, 10/26/2022, 03/20/2024    Pneumo Conj 13-V (2010&after) 08/21/2018, 01/13/2020    Pneumococcal 23 valent 12/04/2018    RSV Vaccine (Arexvy) 04/23/2024    TD,PF 7+ (Tenivac) 11/15/2000    TDAP (Adacel,Boostrix) 08/21/2018    TDAP Vaccine (Boostrix) 08/21/2018    Zoster recombinant adjuvanted (SHINGRIX) 08/21/2018, 10/24/2018             Physical Exam:     VITAL SIGNS:  There were no vitals taken for this visit.     GENERAL APPEARANCE: Not in acute distress. Lying in bed.     PHYSICAL EXAM:   Eyes:     no ptosis, no discharge, no scleral icterus  Mouth, Throat:     mucous membranes moist  Cardiovascular:    Inspection: No Cyanosis, JVD not elevated   Auscultation:  S1, S2 normal, regular rate and rhythm  Respiratory:     Inspection: Not in respiratory distress, Chest expansion  symmetrical   Auscultation: 4 point auscultation done clear to auscultation bilaterally, no wheezes, no rales, and no rhonchi  Skin:     Dry and intact   No rashes  Neurologic:     Higher Mental Function: Conversant, AOx4   Facial asymmetry grossly absent   is ambulatory  Psychiatric:     Appropriate           Laboratory Data:   Metabolic Studies       Recent Labs   Lab Test 05/21/24  1633 05/02/24  1348 04/16/24  1011 04/16/24  0527 04/15/24  0959 04/15/24  0439 04/14/24  1253 04/14/24  1134 04/14/24  0840 09/08/23  2201 09/08/23  1253    135   < > 130*  --  135  --  133*  --    < > 142   POTASSIUM 4.6 5.1   < > 4.0  --  4.2  --  4.2  --    < > 6.0*   CHLORIDE 105 102   < > 99  --  101  --  103  --    < > 107   CO2 24 23   < > 21*  --  22  --  19*  --    < > 25   ANIONGAP 8 10   < > 10  --  12  --  11  --    < > 10   BUN 24.2* 17.2   < > 17.3  --  13.5  --  15.9  --    < > 23.9*   CR 1.26* 0.97   < > 0.96  --  0.94  --  0.89  --    < > 0.98   GFRESTIMATED 61 84   < > 85  --  87  --  >90  --    < > 83   * 404*   < > 285*   < > 149*   < > 304*  --    < > 217*   A1C  --   --   --  8.4*  --   --   --   --   --    < >  --    YELENA 8.7* 8.7*   < > 8.2*  --  8.7*  --  8.6*  --    < > 9.3   PHOS  --   --   --   --   --   --   --   --   --   --  3.4   MAG  --  1.7  --   --   --   --   --   --   --    < >  --    LACT  --   --   --   --   --  1.6  --   --  0.9   < >  --    PCAL  --   --   --   --   --   --   --  30.80*  --    < >  --    FGTL  --   --   --   --   --   --   --  <31  --    < >  --     < > = values in this interval not displayed.       Hepatic Studies    Recent Labs   Lab Test 05/21/24  1633 05/02/24  1348 04/17/24  0607 03/20/24  1409 03/08/24  0526 03/05/24  0554 03/04/24  0626   BILITOTAL 0.4 0.6 0.3   < > 0.3   < > 0.3   DBIL  --   --   --   --  <0.20   < > <0.20   ALKPHOS 221* 209* 163*   < > 249*   < > 308*   PROTTOTAL 6.6 6.6 5.8*   < > 5.5*   < > 5.5*   ALBUMIN 3.5 3.4* 3.0*   < > 2.6*   < >  "2.4*   AST 18 15 20   < > 18   < > 31   ALT 24 18 18   < > 19   < > 30   LDH  --   --   --   --   --   --  179    < > = values in this interval not displayed.       Hematology Studies      Recent Labs   Lab Test 05/21/24  1633 05/02/24  1348 04/17/24  0607 04/16/24  0527 04/15/24  0439 04/14/24  1134 07/21/21  1804 07/07/21  1059 03/12/19  0815 03/04/19  1218   WBC 7.4 6.6 5.2 4.7 6.0 8.6   < > 7.0   < > 3.6*   ANEU  --   --   --   --   --   --   --  4.6  --  2.3   ALYM  --   --   --   --   --   --   --  1.8  --  1.0   KM  --   --   --   --   --   --   --  0.4  --  0.2   AEOS  --   --   --   --   --   --   --  0.2  --  0.1   HGB 12.2* 12.0* 12.4* 12.4* 13.8 13.3   < > 14.0   < > 11.2*   HCT 37.9* 37.4* 37.2* 37.3* 42.8 40.9   < > 40.1   < > 34.4*    287 188 166 167 164   < > 208   < > 199    < > = values in this interval not displayed.       Arterial Blood Gas Testing    Recent Labs   Lab Test 04/12/24  2131 12/23/18  0404 12/22/18  2126 12/22/18 2010 12/22/18  1914 12/22/18  1700   PH  --  7.41 7.43 7.41 7.42 7.36   PCO2  --  41 41 42 39 41   PO2  --  71* 92 221* 247* 184*   HCO3  --  26 27 26 25 23   O2PER 21 40 40 75 75 75        Medication levels    Recent Labs   Lab Test 04/15/24  1541 03/23/22  0720   VANCOMYCIN 10.5  --    TACROL  --  <1.0*       No results found for: \"ACD4\"    Inflammatory Markers    Recent Labs   Lab Test 09/08/23  1253   PSA <0.01       Immune Globulin Studies     Recent Labs   Lab Test 04/12/24  2117 03/24/21  0812   IGG 1,143 1,064   IGM  --  39   IGA  --  324       Pancreatitis testing    Recent Labs   Lab Test 04/13/24  0029 07/19/23  1411 07/10/19  0909 03/04/19  1218 02/18/19  0742 12/23/18  0404 12/22/18  0013   AMYLASE  --   --   --  44 38 46 67   LIPASE 19  --   --  42* 35* 93  --    TRIG  --  170*   < >  --   --   --   --     < > = values in this interval not displayed.       Gout Labs    No lab results found.    Clotting Studies    Recent Labs   Lab Test 04/13/24  0557 " "04/12/24  2117 02/29/24  1004 02/29/24  1004 03/11/23  0434   INR 1.21* 1.07  --  1.08 0.92   PTT  --  26   < > 43*  --     < > = values in this interval not displayed.         Markers    Recent Labs   Lab Test 03/07/22  1331 09/03/21  1757 02/01/21  0723 09/25/20  0820 07/03/20  1109 03/04/19  1218 10/03/18  0923 09/18/18  1524 08/21/18  1050 07/19/18  1132 06/18/18  0747 05/29/18  0754   FETO <1.5 <1.5 1.7 <1.5 <1.5 2.5 5.9 4.4 4.5 4.9 3.8 4.0       Autoimmune Testing  No lab results found.    Invalid input(s): \"PRO3\", \"C3\", \"C4\", \"VASPAN\"    Thyroid Studies     Recent Labs   Lab Test 05/02/24  1348 04/13/24  0029 03/02/24  0751 09/08/23  1253 06/14/23  1556   TSH 7.75* 0.32 5.47* 2.17 4.38*   T4 0.96  --  1.24  --  1.18       Urine Studies     Recent Labs   Lab Test 04/12/24  2134 03/20/24  1413 02/28/24  2035 09/08/23  2306 07/19/23  1427   URINEPH 6.0 5.5 5.5 5.5 6.0   NITRITE Negative Negative Negative Negative Negative   LEUKEST Small* Small* Small* Negative Negative   WBCU 79* 40* 49* 1 1       CSF testing   No lab results found.    Invalid input(s): \"CADAM\", \"EVPCR\", \"ENTPCR\", \"ENTEROVIRUS\"    Body fluid stats    Recent Labs   Lab Test 03/05/24  1131 12/25/18  0719   FCOL Colorless  --    FAPR Clear  --    FWBC 125  --    FNEU 6  --    FLYM 7  --    FMONO 87  --    GS  --  >10 Squamous epithelial cells/low power field indicates oral contamination. Please   recollect.  *  <25 PMNs/low power field  Moderate  Mixed gram positive luc         Microbiology:  Fungal testing  Recent Labs   Lab Test 04/14/24  1134 03/05/24  1131 03/04/24  1129   FGTL <31  --  <31   ASPGAI  --  0.14 0.05   ASPAG  --  Negative  --    ASPGAA  --   --  Negative   COFUNG  --   --  <1:2       Culture   Date Value Ref Range Status   04/14/2024 No Growth  Final   04/14/2024 No Growth  Final   04/12/2024 >100,000 CFU/mL Mixture of Urogenital Luc  Final   04/12/2024 No Growth  Final   04/12/2024 No Growth  Final   03/20/2024 " "10,000-50,000 CFU/mL Escherichia coli ESBL (A)  Final   03/05/2024 No Legionella species isolated  Final   03/05/2024 No Growth  Final   03/05/2024 No Growth  Final   03/05/2024 1+ Normal luc  Final   02/29/2024 3+ Normal luc  Final   02/28/2024 No Growth  Final   02/28/2024 No Growth  Final   02/28/2024 50,000-100,000 CFU/mL Escherichia coli ESBL (A)  Final   03/11/2023 >100,000 CFU/mL Escherichia coli (A)  Final     Culture Micro   Date Value Ref Range Status   02/18/2020   Final    <10,000 colonies/mL  urogenital luc  Susceptibility testing not routinely done     12/16/2019 <10,000 colonies/mL  urogenital luc    Final   12/16/2019 Susceptibility testing not routinely done  Final   10/10/2019 No growth  Final   12/25/2018 (A)  Final    Canceled, Test credited  >10 Squamous epithelial cells/low power field indicates oral contamination. Please   recollect.     12/25/2018   Final    Notification of test cancellation was given to  Tahira Reis RN at 1000 12.25.18     12/24/2018 No growth  Final   12/24/2018 No growth  Final   12/22/2018 No anaerobes isolated  Final   12/22/2018 No growth  Final   12/22/2018 Culture negative after 30 days  Final   12/22/2018 No VRE isolated  Final   (    Last check of C difficile  No results found for: \"CDBPCT\"    Infection Studies to assess Diarrhea No lab results found.    Virology:  Coronavirus-19 testing    Recent Labs   Lab Test 04/12/24 2330 02/28/24 2039 10/11/21  0806 10/01/21  1141   [id number] Negative Negative Negative Negative       Respiratory virus testing    Recent Labs   Lab Test 04/12/24 2330 03/05/24  1131 02/29/24  1000   IFLUA Not Detected Not Detected Not Detected   INFZA Negative  --   --    FLUAH1 Not Detected Not Detected Not Detected   LG2161 Not Detected Not Detected Not Detected   FLUAH3 Not Detected Not Detected Not Detected   IFLUB Not Detected Not Detected Not Detected   INFZB Negative  --   --    PIV1 Not Detected Not Detected Not Detected "   PIV2 Not Detected Not Detected Not Detected   PIV3 Not Detected Not Detected Not Detected   PIV4 Not Detected Not Detected Not Detected   IRSV Negative  --   --    RSVA Not Detected Not Detected Not Detected   RSVB Not Detected Not Detected Not Detected   HMPV Not Detected Not Detected Not Detected   ADENOV Not Detected Not Detected Not Detected   CORONA Not Detected Not Detected Not Detected       CMV Antibody IgG   Date Value Ref Range Status   12/22/2018 >8.0 (H) 0.0 - 0.8 AI Final     Comment:     Positive  Antibody index (AI) values reflect qualitative changes in antibody   concentration that cannot be directly associated with clinical condition or   disease state.     07/19/2018 >8.0 (H) 0.0 - 0.8 AI Final     Comment:     Positive  Antibody index (AI) values reflect qualitative changes in antibody   concentration that cannot be directly associated with clinical condition or   disease state.       CMV Antibody IgM   Date Value Ref Range Status   12/22/2018 <0.2 0.0 - 0.8 AI Final     Comment:     Negative  Antibody index (AI) values reflect qualitative changes in antibody   concentration that cannot be directly associated with clinical condition or   disease state.       EBV Capsid Antibody IgG   Date Value Ref Range Status   12/22/2018 >8.0 (H) 0.0 - 0.8 AI Final     Comment:     Positive, suggests recent or past exposure  Antibody index (AI) values reflect qualitative changes in antibody   concentration that cannot be directly associated with clinical condition or   disease state.     07/19/2018 7.7 (H) 0.0 - 0.8 AI Final     Comment:     Positive, suggests recent or past exposure  Antibody index (AI) values reflect qualitative changes in antibody   concentration that cannot be directly associated with clinical condition or   disease state.       Toxoplasma Antibody IgG   Date Value Ref Range Status   09/18/2018 <3.0 0.0 - 7.1 IU/mL Final     Comment:     Negative- Absence of detectable Toxoplasma gondii  "IgG antibodies. A negative   result does not rule out acute infection.  The magnitude of the measured result is not indicative of the amount of   antibody present. The concentrations of anti-Toxoplasma gondii IgG in a given   specimen determined with assays from different manufacturers can vary due to   differences in assay methods and reagent specificity.       EBV Capsid Antibody IgM   Date Value Ref Range Status   12/22/2018 <0.2 0.0 - 0.8 AI Final     Comment:     No detectable antibody.  Antibody index (AI) values reflect qualitative changes in antibody   concentration that cannot be directly associated with clinical condition or   disease state.         CMV viral loads    Recent Labs   Lab Test 04/14/24  1256   CMVQNT Not Detected       CMV resistance testing  No lab results found.    No results found for: \"H6RES\"    No results found for: \"EBRES\"    No results found for: \"90398\", \"BKRES\"    Parvovirus Testing  No lab results found.    Invalid input(s): \"PRVRES\"    Adenovirus Testing  No lab results found.    Invalid input(s): \"ADENAB\", \"ADENOVIRUS\", \"ADQT\"    Hepatitis B Testing     Recent Labs   Lab Test 03/20/24  1409 12/22/18  0013   AUSAB  --  67.96*   HBCAB  --  Reactive*   HEPBANG Nonreactive  --         Hepatitis C Antibody   Date Value Ref Range Status   12/22/2018 Nonreactive NR^Nonreactive Final     Comment:     Assay performance characteristics have not been established for newborns,   infants, and children         Imaging:  No results found for this or any previous visit (from the past 48 hour(s)).       "

## 2024-06-11 NOTE — PATIENT INSTRUCTIONS
1) Have your blood and urine taken today  2) When you can give us a sputum sample, please do. We will send you home with a sputum cup to collect this today. Once you cough something up, you can place it in your refrigerator and bring it back  3) For your antibiotics, you can complete the 10 day course of Augmentin as planned  4) I would not give any additional antibiotics for now, since you've had 2 courses of antibiotics and we need to figure out if there's something else going on with your symptoms.     Come back to see me in 4-6 weeks

## 2024-06-12 ENCOUNTER — TELEPHONE (OUTPATIENT)
Dept: PULMONOLOGY | Facility: CLINIC | Age: 69
End: 2024-06-12
Payer: COMMERCIAL

## 2024-06-12 LAB
CD3 CELLS # BLD: 776 CELLS/UL (ref 603–2990)
CD3 CELLS NFR BLD: 70 % (ref 49–84)
CD3+CD4+ CELLS # BLD: 289 CELLS/UL (ref 441–2156)
CD3+CD4+ CELLS NFR BLD: 26 % (ref 28–63)
CD3+CD4+ CELLS/CD3+CD8+ CLL BLD: 0.68 % (ref 1.4–2.6)
CD3+CD8+ CELLS # BLD: 426 CELLS/UL (ref 125–1312)
CD3+CD8+ CELLS NFR BLD: 39 % (ref 10–40)
T CELL COMMENT: ABNORMAL

## 2024-06-12 NOTE — TELEPHONE ENCOUNTER
"Called and LVM on daughter's line to reschedule pulmonary appt to a sooner date, per Dr Birch's request - \"Transplant ID ran this patient by me. He has waxing and waning infiltrates. Is immunocompromised after a liver transplant, and could have infection or inflammatory condition. I think he probably needs a more urgent clinic visit so that could get set up for a bronch.\"    Pt should be seen in 2-3 weeks ideally. Left call back number.  "

## 2024-06-12 NOTE — TELEPHONE ENCOUNTER
Daughter called back to reschedule appt to sooner date. Pt is scheduled for PFT and appt with Dr Earl on 7/2.

## 2024-06-13 ENCOUNTER — TRANSFERRED RECORDS (OUTPATIENT)
Dept: HEALTH INFORMATION MANAGEMENT | Facility: CLINIC | Age: 69
End: 2024-06-13

## 2024-06-13 ENCOUNTER — ONCOLOGY VISIT (OUTPATIENT)
Dept: ONCOLOGY | Facility: CLINIC | Age: 69
End: 2024-06-13
Attending: INTERNAL MEDICINE
Payer: COMMERCIAL

## 2024-06-13 VITALS
OXYGEN SATURATION: 96 % | SYSTOLIC BLOOD PRESSURE: 142 MMHG | HEART RATE: 64 BPM | BODY MASS INDEX: 28.76 KG/M2 | WEIGHT: 194.2 LBS | TEMPERATURE: 98.2 F | RESPIRATION RATE: 16 BRPM | DIASTOLIC BLOOD PRESSURE: 80 MMHG

## 2024-06-13 DIAGNOSIS — C22.0 HCC (HEPATOCELLULAR CARCINOMA) (H): Primary | ICD-10-CM

## 2024-06-13 LAB
GALACTOMANNAN AG SERPL QL IA: NEGATIVE
GALACTOMANNAN AG SPEC IA-ACNC: 0.06
H CAPSUL AG UR QL IA: NOT DETECTED
H CAPSUL AG UR-MCNC: NOT DETECTED NG/ML

## 2024-06-13 PROCEDURE — G0463 HOSPITAL OUTPT CLINIC VISIT: HCPCS | Performed by: INTERNAL MEDICINE

## 2024-06-13 PROCEDURE — 99214 OFFICE O/P EST MOD 30 MIN: CPT | Performed by: INTERNAL MEDICINE

## 2024-06-13 ASSESSMENT — PAIN SCALES - GENERAL: PAINLEVEL: NO PAIN (0)

## 2024-06-13 NOTE — PROGRESS NOTES
Oncology follow up visit:  Date on this visit: 6/13/24     HCC    Primary Physician: [facility], Oncology     History Of Present Illness:      Please see previous note for details. I have copied and updated from prior note.      Mr. Limon is a 69 year old male who has been a chronic heavy alcohol drinker presented with right abdominal pain in March 2018 and workup including a CT scan showed cirrhosis, portal hypertension and 2 hepatic lesions in the right hepatic lobe which were concerning for hepatocellular carcinoma.  He had MRI on 3/16/2018 which confirmed cirrhosis and portal hypertension and splenomegaly. 3.7 cm segment 8 lesion was OPTN 5B.  There was an antecedent 0.9 cm satellite nodule in hepatic segment 8 consistent with LI-RADS 4.  In segment 7, 1.5 cm LIRADS 4 lesion was seen  Another 1.9 cm segment 7 lesion was seen which was indeterminate.  Several other LIRADS 3 lesions were scattered.  Alpha-fetoprotein was not elevated.  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 Hepatitis B surface antibody was reactive consistent with effective vaccination. Hepatitis C antibody was nonreactive.  He has had no ascites. He has had mild hepatic and Neuropathy but was unable to tolerate lactulose because of abdominal discomfort. He has no history of variceal bleeding although he has grade 2 esophageal varices which have not been banded and he continues to be on propranolol.    He had repeat MRI done on 5/24/2018 which showed stable to slight increase in size of the segment 8 lesion.  Segment 7 lesion was consistent with LIRADS 4 lesion  Another segment 7 lesion is also consistent with LIRADS 4 lesion  CT of the chest showed a 4 mm solitary pulmonary nodule in the right lower lobe which remains indeterminate. There was no other evidence of metastasis.  Bone scan was negative for any evidence of metastatic disease.    He had chemoembolization of the S8  lesion on 18.    As there was residual tumor left, he had microwave ablation of residual mass in hepatic segment 8 on 2018.     S7 IRADS 4 lesions were not treated    Repeat MRI on 10/3/18 shows no residual viable tumor in S8 lesion  S7 LIRADS 4 lesion was less well defined.  He has some scattered LIRADS 3 lesions.    He had liver transplant in Dec 2018 and explanted liver showed S8 viable tumor with 90% necrosis.  S7 1.1 cm HCC and S5 1.4 cm HCC.  3 benign lymph nodes.  It was a ypT2N0 lesion.    He developed prostate cancer (19 - PSA 5.51ng/dL) and is now s/p RALP with Dr. Casiano on 1/3/20, final path: [place] 3+4=7, neg margins, [id number].   Last PSA on 7/3/2020- <0.01.      Interval hx:   A professional Kinyarwanda Interpretor is available throughout the visit through iPad    He has been having with lung infections and was noted to have ground glass opacities treated with antibiotics. I reviewed notes from ID. There is plan to have PFTs and see Pulmonologist for consideration of BAL.   He still has SOB. No fever. No pain. No nausea or vomiting. BMs are OK.     ECOG 1    ROS:  Rest of the comprehensive review of the system was essentially unremarkable.        I reviewed other history in epic as below.    Past Medical/Surgical History:  Past Medical History:   Diagnosis Date    Anemia     Biliary stricture of transplanted liver (H) 2018    BPH (benign prostatic hyperplasia)     Cancer (H)     hepatocellualr carcinoma    Cirrhosis of liver (H) 2018    Diabetes (H)     Enlarged prostate     Hypothyroidism     Impotence of organic origin 2020    Inguinal hernia     Repaired with mesh on 18    Liver lesion 2018    Liver transplanted (H) 2018     donor liver transplant    Portal vein thrombosis     on path explant    Postoperative atrial fibrillation (H) 2018    Prostate cancer (H)     TB lung, latent     Treated      Past Surgical History:   Procedure  Laterality Date    APPENDECTOMY      BRONCHOSCOPY (RIGID OR FLEXIBLE), DIAGNOSTIC N/A 03/05/2024    Procedure: BRONCHOSCOPY, WITH BRONCHOALVEOLAR LAVAGE;  Surgeon: Jimmy Farley MD;  Location:  GI    COLONOSCOPY      2018    CV CORONARY ANGIOGRAM N/A 10/13/2021    Procedure: CV CORONARY ANGIOGRAM;  Surgeon: Yaya Hampton MD;  Location:  [facility]    CV CORONARY LITHOTRIPSY PCI N/A 10/13/2021    Procedure: CV Coronary Lithotripsy PCI;  Surgeon: Yaya Hampton MD;  Location:  [facility]ATH [facility]    CV INSTANTANEOUS WAVE-FREE RATIO N/A 10/13/2021    Procedure: Instantaneous Wave-Free Ratio;  Surgeon: Yaya Hampton MD;  Location:  [facility]ATH [facility]    CV INTRAVASULAR ULTRASOUND N/A 10/13/2021    Procedure: Intravascular Ultrasound;  Surgeon: Yaya Hampton MD;  Location:  [facility]    CV PCI STENT DRUG ELUTING N/A 10/13/2021    Procedure: Percutaneous Coronary Intervention Stent Drug Eluting;  Surgeon: Yaya Hampton MD;  Location:  [facility]    DAVINCI PROSTATECTOMY N/A 01/03/2020    Procedure: Robot-assisted laparoscopic radical prostatectomy, Bladder biopsy;  Surgeon: Kenrick Casiano MD;  Location:  OR    ENDOSCOPIC RETROGRADE CHOLANGIOPANCREATOGRAM N/A 12/28/2018    Procedure: ENDOSCOPIC RETROGRADE CHOLANGIOPANCREATOGRAM, with Billary Sphincterotomy and Billary Stent Placement;  Surgeon: Omero Lawler MD;  [facility]: [facility]    ENDOSCOPIC RETROGRADE CHOLANGIOPANCREATOGRAM  02/18/2019    Procedure: COMBINED ENDOSCOPIC RETROGRADE CHOLANGIOPANCREATOGRAPHY, Bile Duct Stent Exchange;  Surgeon: Omero Lawler MD;  [facility]    ENDOSCOPIC RETROGRADE CHOLANGIOPANCREATOGRAM N/A 04/29/2019    Procedure: ENDOSCOPIC RETROGRADE CHOLANGIOPANCREATOGRAPHY, WITH BILIARY STENT REMOVAL AND STONE EXTRACTION;  Surgeon: Omero Lawler MD;  Location:  OR     "HERNIORRHAPHY INGUINAL  2018    Procedure: Herniorrhaphy inguinal;  Surgeon: Emil Echevarria MD;  [facility]    IR LIVER BIOPSY PERCUTANEOUS  2018    LAPAROTOMY EXPLORATORY      per the patient \"for infection in the LLQ\"     PICC INSERTION Right 2024    47 cm basilic    TRANSPLANT LIVER RECIPIENT  DONOR N/A 2018    Procedure: TRANSPLANT LIVER RECIPIENT  DONOR;  Surgeon: Emil Echevarria MD;  [facility]     Cancer History:   As above    Allergies:  Allergies as of 2024    (No Known Allergies)     Current Medications:  Current Outpatient Medications   Medication Sig Dispense Refill    Alcohol Swabs PADS Use to swab the area of the injection or fam as directed Per insurance coverage 200 each 1    amoxicillin-clavulanate (AUGMENTIN) 875-125 MG tablet Take 1 tablet by mouth 2 times daily 20 tablet 0    apixaban ANTICOAGULANT (ELIQUIS) 5 MG tablet Take 1 tablet (5 mg) by mouth 2 times daily 60 tablet 1    aspirin (ASA) 81 MG chewable tablet Take 1 tablet (81 mg) by mouth daily for 360 days 90 tablet 3    atorvastatin (LIPITOR) 40 MG tablet Take 1 tablet (40 mg) by mouth daily 90 tablet 3    blood glucose (NO BRAND SPECIFIED) lancets standard Use to test blood sugar 3 times daily or as directed.Please dispense insurance covered brand 300 Lancet 3    blood glucose (NO BRAND SPECIFIED) lancets standard To use to test glucose level in the blood Use to test blood sugar  3  times daily as directed. To accompany glucose monitor brands per insurance coverage. 200 each 1    blood glucose (NO BRAND SPECIFIED) test strip Use to test blood sugar 3 times daily or as directed. Please dispense insurance covered brand 300 strip 3    blood glucose (NO BRAND SPECIFIED) test strip To use to test glucose level in the blood Use to test blood sugar  3 times daily as directed. To accompany glucose monitor brands per insurance coverage. 100 strip 3    blood glucose (ONE TOUCH " SURESOFT) lancing device Lancing device to be used with lancets. 1 each 0    blood glucose monitoring (NO BRAND SPECIFIED) meter device kit Use to test blood sugar 3 times daily or as directed. Please dispense insurance covered brand 1 kit 0    blood glucose monitoring (NO BRAND SPECIFIED) meter device kit Use as directed Per insurance coverage 1 kit 0    blood glucose monitoring (ONE TOUCH ULTRA 2) meter device kit Use to test blood sugar 2 times daily or as directed. 1 kit 0    blood glucose monitoring (ONE TOUCH ULTRASOFT) lancets Use to test blood sugar 2 times daily. 100 each 6    diltiazem ER COATED BEADS (CARDIZEM CD/CARTIA XT) 120 MG 24 hr capsule Take 1 capsule (120 mg) by mouth daily 30 capsule 1    doxycycline hyclate (VIBRAMYCIN) 100 MG capsule Take 1 capsule (100 mg) by mouth 2 times daily 20 capsule 0    hydrALAZINE (APRESOLINE) 25 MG tablet Take 1 tablet (25 mg) by mouth 2 times daily 180 tablet 3    insulin glargine (LANTUS PEN) 100 UNIT/ML pen Inject 28 units subcutaneous at hs. 20 mL 3    insulin glargine (LANTUS SOLOSTAR) 100 UNIT/ML pen Inject 20 Units Subcutaneous at bedtime 15 mL 1    insulin pen needle (31G X 5 MM) 31G X 5 MM miscellaneous Use one  pen needles daily or as directed. 100 each 3    levofloxacin (LEVAQUIN) 500 MG tablet Take 1 tablet (500 mg) by mouth daily 3 tablet 0    levothyroxine (SYNTHROID/LEVOTHROID) 150 MCG tablet Take 1 tablet (150 mcg) by mouth daily 90 tablet 3    metFORMIN (GLUCOPHAGE XR) 500 MG 24 hr tablet Take 1,000 mg by mouth 2 times daily (with meals)      methocarbamol (ROBAXIN) 500 MG tablet Take 500 mg by mouth 4 times daily as needed for muscle spasms      mycophenolate (GENERIC EQUIVALENT) 500 MG tablet Take 500 mg by mouth 2 times daily      predniSONE (DELTASONE) 5 MG tablet Take 1 tablet (5 mg) by mouth daily 90 tablet 1    sulfamethoxazole-trimethoprim (BACTRIM DS) 800-160 MG tablet Take 1 tablet by mouth 2 times daily 14 tablet 0    ursodiol (ACTIGALL)  250 MG tablet Take 1 tablet (250 mg) by mouth 2 times daily 180 tablet 3    Vitamin D3 (CHOLECALCIFEROL) 25 mcg (1000 units) tablet Take 2 tablets (50 mcg) by mouth daily 180 tablet 3    mycophenolate (GENERIC EQUIVALENT) 250 MG capsule Take 2 capsules (500 mg) by mouth 2 times daily for 90 days 90 capsule 3      Family History:  Family History   Problem Relation Age of Onset    Memory loss Mother     Liver Disease Brother     Skin Cancer No family hx of     Melanoma No family hx of       Maternal grand mother had Uterus cancer at 63  He has 3 living children. One son with schizophrenia  One daughter dies of leukemia at 8 years of age  Son  shortly after birth. He had some brain congenital anomaly    Social History:  Social History     Socioeconomic History    Marital status:      Spouse name: Not on file    Number of children: Not on file    Years of education: Not on file    Highest education level: Not on file   Occupational History    Not on file   Tobacco Use    Smoking status: Former     Current packs/day: 0.00     Average packs/day: (1.4 ttl pk-yrs)     Types: Cigarettes, Cigars     Start date: 1970     Quit date: 3/14/2018     Years since quittin.2    Smokeless tobacco: Never    Tobacco comments:     Quit smoking 2018, one cigarette after dinner   Vaping Use    Vaping status: Never Used   Substance and Sexual Activity    Alcohol use: No     Comment: Quit drinking 2018    Drug use: No    Sexual activity: Not on file   Other Topics Concern    Parent/sibling w/ CABG, MI or angioplasty before 65F 55M? Not Asked   Social History Narrative    Born in [place], came to [place] years ago.     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cardboard, trimming meats     Social Determinants of Health     Financial Resource Strain: Not on file   Food Insecurity: Not on file   Transportation Needs: Not on file   Physical Activity: Not on file   Stress: Not on file   Social Connections: Not on file   Interpersonal  Safety: Low Risk  (3/20/2024)    Interpersonal Safety     Do you feel physically and emotionally safe where you currently live?: Yes     Within the past 12 months, have you been hit, slapped, kicked or otherwise physically hurt by someone?: No     Within the past 12 months, have you been humiliated or emotionally abused in other ways by your partner or ex-partner?: No   Housing Stability: Not on file   smoker for 25 years. One pack for 1 week. Quit in Feb 2018.  Has been a heavy drinker for 30 years, beer about 36 every week. Last drink was around Feb 2018.  No drug use.  Lives with wife.     Physical Exam:  BP (!) 142/80 (BP Location: Right arm, Patient Position: Sitting, Cuff Size: Adult Regular)   Pulse 64   Temp 98.2  F (36.8  C) (Oral)   Resp 16   Wt 88.1 kg (194 lb 3.2 oz)   SpO2 96%   BMI 28.76 kg/m     Wt Readings from Last 4 Encounters:   06/13/24 88.1 kg (194 lb 3.2 oz)   06/11/24 86.7 kg (191 lb 1.6 oz)   06/06/24 88.6 kg (195 lb 4.8 oz)   06/04/24 88 kg (194 lb)     CONSTITUTIONAL: No apparent distress  EYES: PERRLA, without pallor or jaundice  ENT/MOUTH: Ears unremarkable. No oral lesions  CVS: s1s2 normal  RESPIRATORY: Chest is clear  GI: Abdomen is benign  NEURO: Alert and oriented ×3  INTEGUMENT: no concerning skin rashes   LYMPHATIC: no palpable lymphadenopathy  MUSCULOSKELETAL: Unremarkable. No bony tenderness.   EXTREMITIES: no pedal edema  PSYCH: Mentation, mood and affect are appropriate        Laboratory/Imaging Studies    Reviewed  6/11/2024  CBC is unremarkable.      CMP unremarkable except alkaline phosphatase 172.  Glucose 225    Alpha-fetoprotein was <1.8 on 5/21/2024.    I reviewed imaging myself.    CT chest without contrast 5/31/2024          Impression:   1. Left upper lobe predominant peribronchial vascular groundglass and  consolidative opacities likely representative of an ongoing  infectious/inflammatory process/organizing pneumonia.  2. Improved appearance of left lower lobe  predominant groundglass  opacities which are nearly resolved.  3. Redemonstration of mild pulmonary artery enlargement. Correlate  clinically for pulmonary hypertension.    MRI Abdomen 5/21/2024 did not show any evidence of recurrence.      IMPRESSION:    1. Postoperative changes of liver transplantation without suspicious  liver lesion.  2. Mild heterogenous peripheral hepatic parenchymal enhancement, which  can be seen with passive hepatic congestion.   3. Incompletely evaluated reticular/consolidative signal in the left  upper lobe, which raises the concern for infectious/inflammatory  process. Recommend dedicated chest CT for further evaluation if  clinically indicated.  4. Bilateral pleural effusions.  5. Based on this exam only, the patient is within Leeds criteria.      ASSESSMENT/PLAN:    HCC  He had liver transplant in Dec 2018 and explanted liver showed S8 viable tumor with 90% necrosis.  S7 1.1 cm HCC and S5 1.4 cm HCC.  3 benign lymph nodes.  It was a ypT2N0 lesion.    Since then he has been on observation and currently there is no evidence of recurrence.  Now that he is more than 5 years out from the liver transplant and without evidence of HCC recurrence.  His risk of recurrence of HCC at this time is very small , therefore I will stop doing routine MRI.       Groundglass opacities of the lung.  Mainly involving the left upper lobe of the lung.  I reviewed notes from infectious disease.  Agree with pulmonary evaluation for BAL.        Prostate Cancer. He had RALP with Dr. Casiano on 1/3/20, final path: [place] 3+4=7, neg margins, [id number].   He has been on observation and doing well.  Last PSA <0.01 on 9/8/2023.  Continue to follow with urology.        As now he is more than 5 years out from liver transplant and without evidence of recurrence of HCC, going forward he can follow-up with liver transplant team, infectious disease, pulmonology and PCP.  See me on an as-needed basis.    All questions  answered and he is comfortable with this plan    Hi Melgar

## 2024-06-13 NOTE — NURSING NOTE
"Oncology Rooming Note    June 13, 2024 1:12 PM   Loy Limon is a 69 year old male who presents for:    Chief Complaint   Patient presents with    Oncology Clinic Visit     Prostate cancer      Initial Vitals: BP (!) 142/80 (BP Location: Right arm, Patient Position: Sitting, Cuff Size: Adult Regular)   Pulse 64   Temp 98.2  F (36.8  C) (Oral)   Resp 16   Wt 88.1 kg (194 lb 3.2 oz)   SpO2 96%   BMI 28.76 kg/m   Estimated body mass index is 28.76 kg/m  as calculated from the following:    Height as of 4/13/24: 1.75 m (5' 8.9\").    Weight as of this encounter: 88.1 kg (194 lb 3.2 oz). Body surface area is 2.07 meters squared.  No Pain (0) Comment: Data Unavailable   No LMP for male patient.  Allergies reviewed: Yes  Medications reviewed: Yes    Medications: Medication refills not needed today.  Pharmacy name entered into EPIC:    [facility] - [place], [place] - [address]  [facility] - [place], [place] - [address] [id number] - [place], [place] - [address] AT [address]    Frailty Screening:   Is the patient here for a new oncology consult visit in cancer care? 2. No      Clinical concerns:  none      Nelida Pennington"

## 2024-06-13 NOTE — LETTER
6/13/2024      Loy Limon  [address]  [facility] [address]      Dear Colleague,    Thank you for referring your patient, Loy Limon, to the [facility]. Please see a copy of my visit note below.    Oncology follow up visit:  Date on this visit: 6/13/24     HCC    Primary Physician: [facility], Oncology     History Of Present Illness:      Please see previous note for details. I have copied and updated from prior note.      Mr. Limon is a 69 year old male who has been a chronic heavy alcohol drinker presented with right abdominal pain in March 2018 and workup including a CT scan showed cirrhosis, portal hypertension and 2 hepatic lesions in the right hepatic lobe which were concerning for hepatocellular carcinoma.  He had MRI on 3/16/2018 which confirmed cirrhosis and portal hypertension and splenomegaly. 3.7 cm segment 8 lesion was OPTN 5B.  There was an antecedent 0.9 cm satellite nodule in hepatic segment 8 consistent with LI-RADS 4.  In segment 7, 1.5 cm LIRADS 4 lesion was seen  Another 1.9 cm segment 7 lesion was seen which was indeterminate.  Several other LIRADS 3 lesions were scattered.  Alpha-fetoprotein was not elevated.  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 Hepatitis B surface antibody was reactive consistent with effective vaccination. Hepatitis C antibody was nonreactive.  He has had no ascites. He has had mild hepatic and Neuropathy but was unable to tolerate lactulose because of abdominal discomfort. He has no history of variceal bleeding although he has grade 2 esophageal varices which have not been banded and he continues to be on propranolol.    He had repeat MRI done on 5/24/2018 which showed stable to slight increase in size of the segment 8 lesion.  Segment 7 lesion was consistent with LIRADS 4 lesion  Another segment 7 lesion is also consistent with LIRADS 4 lesion  CT of the  chest showed a 4 mm solitary pulmonary nodule in the right lower lobe which remains indeterminate. There was no other evidence of metastasis.  Bone scan was negative for any evidence of metastatic disease.    He had chemoembolization of the S8 lesion on 6/13/18.    As there was residual tumor left, he had microwave ablation of residual mass in hepatic segment 8 on 7/2/2018.     S7 IRADS 4 lesions were not treated    Repeat MRI on 10/3/18 shows no residual viable tumor in S8 lesion  S7 LIRADS 4 lesion was less well defined.  He has some scattered LIRADS 3 lesions.    He had liver transplant in Dec 2018 and explanted liver showed S8 viable tumor with 90% necrosis.  S7 1.1 cm HCC and S5 1.4 cm HCC.  3 benign lymph nodes.  It was a ypT2N0 lesion.    He developed prostate cancer (4/26/19 - PSA 5.51ng/dL) and is now s/p RALP with Dr. Casiano on 1/3/20, final path: Rebecca 3+4=7, neg margins, [id number].   Last PSA on 7/3/2020- <0.01.      Interval hx:   A professional Uzbek Interpretor is available throughout the visit through iPad    He has been having with lung infections and was noted to have ground glass opacities treated with antibiotics. I reviewed notes from ID. There is plan to have PFTs and see Pulmonologist for consideration of BAL.   He still has SOB. No fever. No pain. No nausea or vomiting. BMs are OK.     ECOG 1    ROS:  Rest of the comprehensive review of the system was essentially unremarkable.        I reviewed other history in epic as below.    Past Medical/Surgical History:  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/28/2018     BPH (benign prostatic hyperplasia)      Cancer (H)     hepatocellualr carcinoma     Cirrhosis of liver (H) 05/08/2018     Diabetes (H)      Enlarged prostate      Hypothyroidism      Impotence of organic origin 09/25/2020     Inguinal hernia     Repaired with mesh on 12/22/18     Liver lesion 05/08/2018     Liver  transplanted (H) 2018     donor liver transplant     Portal vein thrombosis     on path explant     Postoperative atrial fibrillation (H) 2018     Prostate cancer (H)      TB lung, latent     Treated      Past Surgical History:   Procedure Laterality Date     APPENDECTOMY       BRONCHOSCOPY (RIGID OR FLEXIBLE), DIAGNOSTIC N/A 2024    Procedure: BRONCHOSCOPY, WITH BRONCHOALVEOLAR LAVAGE;  Surgeon: Jimmy Farley MD;  Location:  GI     COLONOSCOPY           CV CORONARY ANGIOGRAM N/A 10/13/2021    Procedure: CV CORONARY ANGIOGRAM;  Surgeon: Yaya Hampton MD;  Location:  HEART CARDIAC CATH LAB     CV CORONARY LITHOTRIPSY PCI N/A 10/13/2021    Procedure: CV Coronary Lithotripsy PCI;  Surgeon: Yaya Hamtpon MD;  Location:  HEART CARDIAC CATH LAB     CV INSTANTANEOUS WAVE-FREE RATIO N/A 10/13/2021    Procedure: Instantaneous Wave-Free Ratio;  Surgeon: Yaya Hampton MD;  Location:  HEART CARDIAC CATH LAB     CV INTRAVASULAR ULTRASOUND N/A 10/13/2021    Procedure: Intravascular Ultrasound;  Surgeon: Yaya Hampton MD;  Location:  HEART CARDIAC CATH LAB     CV PCI STENT DRUG ELUTING N/A 10/13/2021    Procedure: Percutaneous Coronary Intervention Stent Drug Eluting;  Surgeon: Yaya Hampton MD;  Location:  HEART CARDIAC CATH LAB     DAVINCI PROSTATECTOMY N/A 2020    Procedure: Robot-assisted laparoscopic radical prostatectomy, Bladder biopsy;  Surgeon: Kenrick Casiano MD;  Location: [facility]     ENDOSCOPIC RETROGRADE CHOLANGIOPANCREATOGRAM N/A 2018    Procedure: ENDOSCOPIC RETROGRADE CHOLANGIOPANCREATOGRAM, with Billary Sphincterotomy and Billary Stent Placement;  Surgeon: Omero Lawler MD;  Location: [facility] OR     ENDOSCOPIC RETROGRADE CHOLANGIOPANCREATOGRAM  2019    Procedure: COMBINED ENDOSCOPIC RETROGRADE CHOLANGIOPANCREATOGRAPHY, Bile Duct Stent Exchange;  Surgeon:  "Omero Lawler MD;  Location: [facility] OR     ENDOSCOPIC RETROGRADE CHOLANGIOPANCREATOGRAM N/A 2019    Procedure: ENDOSCOPIC RETROGRADE CHOLANGIOPANCREATOGRAPHY, WITH BILIARY STENT REMOVAL AND STONE EXTRACTION;  Surgeon: Omero Lawler MD;  Location: [facility] OR     HERNIORRHAPHY INGUINAL  2018    Procedure: Herniorrhaphy inguinal;  Surgeon: Emil Echevarria MD;  Location: [facility] OR     IR LIVER BIOPSY PERCUTANEOUS  2018     LAPAROTOMY EXPLORATORY      per the patient \"for infection in the LLQ\"      PICC INSERTION Right 2024    47 cm basilic     TRANSPLANT LIVER RECIPIENT  DONOR N/A 2018    Procedure: TRANSPLANT LIVER RECIPIENT  DONOR;  Surgeon: Emil Echevarria MD;  Location: [facility] OR     Cancer History:   As above    Allergies:  Allergies as of 2024     (No Known Allergies)     Current Medications:  Current Outpatient Medications   Medication Sig Dispense Refill     Alcohol Swabs PADS Use to swab the area of the injection or fam as directed Per insurance coverage 200 each 1     amoxicillin-clavulanate (AUGMENTIN) 875-125 MG tablet Take 1 tablet by mouth 2 times daily 20 tablet 0     apixaban ANTICOAGULANT (ELIQUIS) 5 MG tablet Take 1 tablet (5 mg) by mouth 2 times daily 60 tablet 1     aspirin (ASA) 81 MG chewable tablet Take 1 tablet (81 mg) by mouth daily for 360 days 90 tablet 3     atorvastatin (LIPITOR) 40 MG tablet Take 1 tablet (40 mg) by mouth daily 90 tablet 3     blood glucose (NO BRAND SPECIFIED) lancets standard Use to test blood sugar 3 times daily or as directed.Please dispense insurance covered brand 300 Lancet 3     blood glucose (NO BRAND SPECIFIED) lancets standard To use to test glucose level in the blood Use to test blood sugar  3  times daily as directed. To accompany glucose monitor brands per insurance coverage. 200 each 1     blood glucose (NO BRAND SPECIFIED) test strip Use to test blood sugar 3 times daily or as directed. Please " dispense insurance covered brand 300 strip 3     blood glucose (NO BRAND SPECIFIED) test strip To use to test glucose level in the blood Use to test blood sugar  3 times daily as directed. To accompany glucose monitor brands per insurance coverage. 100 strip 3     blood glucose (ONE TOUCH SURESOFT) lancing device Lancing device to be used with lancets. 1 each 0     blood glucose monitoring (NO BRAND SPECIFIED) meter device kit Use to test blood sugar 3 times daily or as directed. Please dispense insurance covered brand 1 kit 0     blood glucose monitoring (NO BRAND SPECIFIED) meter device kit Use as directed Per insurance coverage 1 kit 0     blood glucose monitoring (ONE TOUCH ULTRA 2) meter device kit Use to test blood sugar 2 times daily or as directed. 1 kit 0     blood glucose monitoring (ONE TOUCH ULTRASOFT) lancets Use to test blood sugar 2 times daily. 100 each 6     diltiazem ER COATED BEADS (CARDIZEM CD/CARTIA XT) 120 MG 24 hr capsule Take 1 capsule (120 mg) by mouth daily 30 capsule 1     doxycycline hyclate (VIBRAMYCIN) 100 MG capsule Take 1 capsule (100 mg) by mouth 2 times daily 20 capsule 0     hydrALAZINE (APRESOLINE) 25 MG tablet Take 1 tablet (25 mg) by mouth 2 times daily 180 tablet 3     insulin glargine (LANTUS PEN) 100 UNIT/ML pen Inject 28 units subcutaneous at hs. 20 mL 3     insulin glargine (LANTUS SOLOSTAR) 100 UNIT/ML pen Inject 20 Units Subcutaneous at bedtime 15 mL 1     insulin pen needle (31G X 5 MM) 31G X 5 MM miscellaneous Use one  pen needles daily or as directed. 100 each 3     levofloxacin (LEVAQUIN) 500 MG tablet Take 1 tablet (500 mg) by mouth daily 3 tablet 0     levothyroxine (SYNTHROID/LEVOTHROID) 150 MCG tablet Take 1 tablet (150 mcg) by mouth daily 90 tablet 3     metFORMIN (GLUCOPHAGE XR) 500 MG 24 hr tablet Take 1,000 mg by mouth 2 times daily (with meals)       methocarbamol (ROBAXIN) 500 MG tablet Take 500 mg by mouth 4 times daily as needed for muscle spasms        mycophenolate (GENERIC EQUIVALENT) 500 MG tablet Take 500 mg by mouth 2 times daily       predniSONE (DELTASONE) 5 MG tablet Take 1 tablet (5 mg) by mouth daily 90 tablet 1     sulfamethoxazole-trimethoprim (BACTRIM DS) 800-160 MG tablet Take 1 tablet by mouth 2 times daily 14 tablet 0     ursodiol (ACTIGALL) 250 MG tablet Take 1 tablet (250 mg) by mouth 2 times daily 180 tablet 3     Vitamin D3 (CHOLECALCIFEROL) 25 mcg (1000 units) tablet Take 2 tablets (50 mcg) by mouth daily 180 tablet 3     mycophenolate (GENERIC EQUIVALENT) 250 MG capsule Take 2 capsules (500 mg) by mouth 2 times daily for 90 days 90 capsule 3      Family History:  Family History   Problem Relation Age of Onset     Memory loss Mother      Liver Disease Brother      Skin Cancer No family hx of      Melanoma No family hx of       Maternal grand mother had Uterus cancer at 63  He has 3 living children. One son with schizophrenia  One daughter dies of leukemia at 8 years of age  Son  shortly after birth. He had some brain congenital anomaly    Social History:  Social History     Socioeconomic History     Marital status:      Spouse name: Not on file     Number of children: Not on file     Years of education: Not on file     Highest education level: Not on file   Occupational History     Not on file   Tobacco Use     Smoking status: Former     Current packs/day: 0.00     Average packs/day: (1.4 ttl pk-yrs)     Types: Cigarettes, Cigars     Start date: 1970     Quit date: 3/14/2018     Years since quittin.2     Smokeless tobacco: Never     Tobacco comments:     Quit smoking 2018, one cigarette after dinner   Vaping Use     Vaping status: Never Used   Substance and Sexual Activity     Alcohol use: No     Comment: Quit drinking 2018     Drug use: No     Sexual activity: Not on file   Other Topics Concern     Parent/sibling w/ CABG, MI or angioplasty before 65F 55M? Not Asked   Social History Narrative    Born in [place], came  to [place] years ago.     Has worked in [employer], trimming meats     Social Determinants of Health     Financial Resource Strain: Not on file   Food Insecurity: Not on file   Transportation Needs: Not on file   Physical Activity: Not on file   Stress: Not on file   Social Connections: Not on file   Interpersonal Safety: Low Risk  (3/20/2024)    Interpersonal Safety      Do you feel physically and emotionally safe where you currently live?: Yes      Within the past 12 months, have you been hit, slapped, kicked or otherwise physically hurt by someone?: No      Within the past 12 months, have you been humiliated or emotionally abused in other ways by your partner or ex-partner?: No   Housing Stability: Not on file   smoker for 25 years. One pack for 1 week. Quit in Feb 2018.  Has been a heavy drinker for 30 years, beer about 36 every week. Last drink was around Feb 2018.  No drug use.  Lives with wife.     Physical Exam:  BP (!) 142/80 (BP Location: Right arm, Patient Position: Sitting, Cuff Size: Adult Regular)   Pulse 64   Temp 98.2  F (36.8  C) (Oral)   Resp 16   Wt 88.1 kg (194 lb 3.2 oz)   SpO2 96%   BMI 28.76 kg/m     Wt Readings from Last 4 Encounters:   06/13/24 88.1 kg (194 lb 3.2 oz)   06/11/24 86.7 kg (191 lb 1.6 oz)   06/06/24 88.6 kg (195 lb 4.8 oz)   06/04/24 88 kg (194 lb)     CONSTITUTIONAL: No apparent distress  EYES: PERRLA, without pallor or jaundice  ENT/MOUTH: Ears unremarkable. No oral lesions  CVS: s1s2 normal  RESPIRATORY: Chest is clear  GI: Abdomen is benign  NEURO: Alert and oriented ×3  INTEGUMENT: no concerning skin rashes   LYMPHATIC: no palpable lymphadenopathy  MUSCULOSKELETAL: Unremarkable. No bony tenderness.   EXTREMITIES: no pedal edema  PSYCH: Mentation, mood and affect are appropriate        Laboratory/Imaging Studies    Reviewed  6/11/2024  CBC is unremarkable.      CMP unremarkable except alkaline phosphatase 172.  Glucose 225    Alpha-fetoprotein was <1.8  on 5/21/2024.    I reviewed imaging myself.    CT chest without contrast 5/31/2024          Impression:   1. Left upper lobe predominant peribronchial vascular groundglass and  consolidative opacities likely representative of an ongoing  infectious/inflammatory process/organizing pneumonia.  2. Improved appearance of left lower lobe predominant groundglass  opacities which are nearly resolved.  3. Redemonstration of mild pulmonary artery enlargement. Correlate  clinically for pulmonary hypertension.    MRI Abdomen 5/21/2024 did not show any evidence of recurrence.      IMPRESSION:    1. Postoperative changes of liver transplantation without suspicious  liver lesion.  2. Mild heterogenous peripheral hepatic parenchymal enhancement, which  can be seen with passive hepatic congestion.   3. Incompletely evaluated reticular/consolidative signal in the left  upper lobe, which raises the concern for infectious/inflammatory  process. Recommend dedicated chest CT for further evaluation if  clinically indicated.  4. Bilateral pleural effusions.  5. Based on this exam only, the patient is within Antonio criteria.      ASSESSMENT/PLAN:    HCC  He had liver transplant in Dec 2018 and explanted liver showed S8 viable tumor with 90% necrosis.  S7 1.1 cm HCC and S5 1.4 cm HCC.  3 benign lymph nodes.  It was a ypT2N0 lesion.    Since then he has been on observation and currently there is no evidence of recurrence.  Now that he is more than 5 years out from the liver transplant and without evidence of HCC recurrence.  His risk of recurrence of HCC at this time is very small , therefore I will stop doing routine MRI.       Groundglass opacities of the lung.  Mainly involving the left upper lobe of the lung.  I reviewed notes from infectious disease.  Agree with pulmonary evaluation for BAL.        Prostate Cancer. He had RALP with Dr. Casiano on 1/3/20, final path: [place] 3+4=7, neg margins, [id number].   He has been on observation and  doing well.  Last PSA <0.01 on 9/8/2023.  Continue to follow with urology.        As now he is more than 5 years out from liver transplant and without evidence of recurrence of HCC, going forward he can follow-up with liver transplant team, infectious disease, pulmonology and PCP.  See me on an as-needed basis.    All questions answered and he is comfortable with this plan    Hi Melgar            Again, thank you for allowing me to participate in the care of your patient.        Sincerely,        Hi Melgar MD

## 2024-06-14 LAB — SCANNED LAB RESULT: NORMAL

## 2024-06-17 NOTE — CONFIDENTIAL NOTE
RECORDS RECEIVED FROM: Internal    DATE RECEIVED: 7/2/24    NOTES STATUS DETAILS   OFFICE NOTE from referring provider Internal    Jaswinder Anne MD      MEDICATION LIST Internal     IMAGING  (NEED IMAGES AND REPORTS)     CT SCAN Internal  5.31.24, 4.14.24, 3.22.24, 3.22.24, 3.3.24 more in epic    CHEST XRAY (CXR) Internal  5.2.24, 4.12.24, 3.6.24, 3.2.24, 2.28.24   TESTS     PULMONARY FUNCTION TESTING (PFT) Internal  Scheduled 7/2/24

## 2024-06-26 ENCOUNTER — TELEPHONE (OUTPATIENT)
Dept: INFECTIOUS DISEASES | Facility: CLINIC | Age: 69
End: 2024-06-26
Payer: COMMERCIAL

## 2024-06-26 ENCOUNTER — APPOINTMENT (OUTPATIENT)
Dept: INTERPRETER SERVICES | Facility: CLINIC | Age: 69
End: 2024-06-26
Payer: COMMERCIAL

## 2024-06-26 NOTE — TELEPHONE ENCOUNTER
EP called 6/26 to sched a 1 month follow up with Dr. Capone via in-person per checkout notes from 6/11.

## 2024-06-27 ENCOUNTER — TELEPHONE (OUTPATIENT)
Dept: ENDOCRINOLOGY | Facility: CLINIC | Age: 69
End: 2024-06-27
Payer: COMMERCIAL

## 2024-06-27 NOTE — TELEPHONE ENCOUNTER
"----- Message from Sue HERRERA sent at 6/27/2024  1:38 PM CDT -----  Regarding: Provider request  [employer] [facility] 6/4/24 checkout comments: \"Please check to see which glucose meter pt's insurance will cover and order the glucose meter and supplies.\"    Thank you,  Silvia"

## 2024-06-27 NOTE — TELEPHONE ENCOUNTER
The order for Glucometer/ strips /lancets for whatever is covered already went to [facility] 6/4. The pharmacy will dispense what io covered . Nikki Begum RN on 6/27/2024 at 2:10 PM

## 2024-06-27 NOTE — TELEPHONE ENCOUNTER
RECORDS RECEIVED FROM:    DATE RECEIVED:    NOTES STATUS DETAILS   OFFICE NOTE from referring provider  Internal Dr. Anne 5-22-24   OFFICE NOTE from other cardiologists  N/A    RECORDS from hospital/ED Internal 5-2-24   MEDICATION LIST Internal    GENERAL CARDIO RECORDS   (ALL APPOINTMENT TYPES)     LABS (CBC,BMP,CMP, TSH) Internal    EKG (STRIPS & REPORTS) Internal 5-2-24   MONITORS (STRIPS & REPORTS) Internal 5-14-24   ECHOS (IMAGES AND REPORTS) Internal 4-13-24   STRESS TESTS (IMAGES AND REPORTS) N/A    cMRI (IMAGES AND REPORTS) N/A    Cardiac cath (IMAGES AND REPORTS) N/A    CT/CTA (IMAGES AND REPORTS) Internal 5-31-24   NEW EP     ICD/PACEMAKER IMPLANT No    CARDIOVERSION N/A    TILT TABLE STUDIES N/A

## 2024-07-02 ENCOUNTER — PRE VISIT (OUTPATIENT)
Dept: CARDIOLOGY | Facility: CLINIC | Age: 69
End: 2024-07-02

## 2024-07-02 ENCOUNTER — PRE VISIT (OUTPATIENT)
Dept: PULMONOLOGY | Facility: CLINIC | Age: 69
End: 2024-07-02

## 2024-07-02 ENCOUNTER — OFFICE VISIT (OUTPATIENT)
Dept: CARDIOLOGY | Facility: CLINIC | Age: 69
End: 2024-07-02
Attending: STUDENT IN AN ORGANIZED HEALTH CARE EDUCATION/TRAINING PROGRAM
Payer: COMMERCIAL

## 2024-07-02 VITALS
BODY MASS INDEX: 29.06 KG/M2 | OXYGEN SATURATION: 95 % | HEART RATE: 76 BPM | WEIGHT: 196.2 LBS | DIASTOLIC BLOOD PRESSURE: 76 MMHG | SYSTOLIC BLOOD PRESSURE: 152 MMHG

## 2024-07-02 DIAGNOSIS — R00.0 TACHYCARDIA: ICD-10-CM

## 2024-07-02 DIAGNOSIS — E11.9 DM TYPE 2, GOAL HBA1C 7%-8% (H): ICD-10-CM

## 2024-07-02 DIAGNOSIS — I48.91 ATRIAL FIBRILLATION WITH RAPID VENTRICULAR RESPONSE (H): ICD-10-CM

## 2024-07-02 DIAGNOSIS — I10 ESSENTIAL HYPERTENSION: ICD-10-CM

## 2024-07-02 PROCEDURE — 99215 OFFICE O/P EST HI 40 MIN: CPT | Performed by: INTERNAL MEDICINE

## 2024-07-02 PROCEDURE — 93010 ELECTROCARDIOGRAM REPORT: CPT | Performed by: INTERNAL MEDICINE

## 2024-07-02 PROCEDURE — 93005 ELECTROCARDIOGRAM TRACING: CPT

## 2024-07-02 PROCEDURE — 99417 PROLNG OP E/M EACH 15 MIN: CPT | Performed by: INTERNAL MEDICINE

## 2024-07-02 PROCEDURE — G0463 HOSPITAL OUTPT CLINIC VISIT: HCPCS | Performed by: INTERNAL MEDICINE

## 2024-07-02 RX ORDER — DILTIAZEM HYDROCHLORIDE 240 MG/1
240 CAPSULE, COATED, EXTENDED RELEASE ORAL DAILY
Qty: 90 CAPSULE | Refills: 1 | Status: SHIPPED | OUTPATIENT
Start: 2024-07-02

## 2024-07-02 ASSESSMENT — PAIN SCALES - GENERAL: PAINLEVEL: NO PAIN (0)

## 2024-07-02 NOTE — PATIENT INSTRUCTIONS
You were seen in the Electrophysiology Clinic today by: Dr Plascencia    Plan:   Medication Changes:   INCREASE- Diltiazem to 240mg once daily  Can take 2 of the 120mg tablets daily until you run out, new prescription is for 240mg tablets so only take 1 daily       Follow up Visit:  6 months         If you have further questions, please utilize [employer] to contact us.     Your Care Team:    EP Cardiology   Telephone Number     Nurse Line  Lindsey Pino, RN   Khloe Farr, RN  Sami Wylie RN   [phone]     For scheduling appointments:   Kim   [phone]   For procedure scheduling:    Nai Nichole     [phone]   For the Device Clinic (Pacemakers, ICDs, Loop Recorders)    During business hours: [phone]  After business hours:   [phone]- select option 4 and ask for job code [id number].       On-call cardiologist for after hours or on weekends:   [phone], option #4, and ask to speak to the on-call cardiologist.     Cardiovascular Clinic:   [address] [place] [address]. [place] [address]      As always, Thank you for trusting us with your health care needs!

## 2024-07-02 NOTE — PROGRESS NOTES
HPI:   Mr Limon is a very pleasant 69-year-old male with known diabetes mellitus and ischemic heart disease.  He is in the clinic today with an  via telephone.    Mr. Limon was recently admitted to [facility] with an NSTEMI and uncontrolled A-fib.  He was begun on diltiazem and Eliquis.  He is currently here for follow-up.  In this regard he had a Zio patch recording for 14 days recently and it showed 100% atrial fibrillation with periods of very rapid rates despite treatment with diltiazem 120 daily.    Patient has known coronary artery disease and is status post PCI to his mid LAD and has nonobstructive disease in D1, D2 and RCA.      At today's visit Mr. Limon has no cardiac complaints.  He does note some chronic pulmonary complaints with difficulty listening phlegm.  I suggested he raise this issue with his PCP as it may merit some change in his pulmonary medications.    In terms of his cardiac status we reviewed his echocardiogram from his recent hospital admission and is summarized below.  In essence his LV function is well-preserved.  Our recommendation will be to increase his diltiazem dose to 240 mg once daily in order to try and achieve improved heart rate control.  Depending on his symptoms I will plan to repeat his Zio patch after our next clinic visit about 6 months time.  I have advised Mr. Porras to feel free to contact us should there be any change in his symptoms that is of worry to him.  He voiced understanding and agreement and this was also repeated using the .  Findings were provided in written form  He was able to get  ECHO 4/24  Interpretation Summary  Global and regional left ventricular function is normal with an EF of 55-60%.  Right ventricular function, chamber size, wall motion, and thickness are  normal.  Severe left atrial enlargement is present.  The inferior vena cava is normal.  No pericardial effusion is present.  There is no prior study for direct  NEPHROLOGY OFFICE FOLLOW UP  Rowdy He 77 y o  female MRN: [id number]    Encounter: [id number] [facility] [address] )     comparison.    Left Ventricle  Global and regional left ventricular function is normal with an EF of 55-60%.  Left ventricular size is normal. Possible apical hypertrophy. Diastolic  function not assessed due to atrial fibrillation. No regional wall motion  abnormalities are seen.     Right Ventricle  Right ventricular function, chamber size, wall motion, and thickness are  normal.     Atria  The right atria appears normal. Severe left atrial enlargement is present.     Mitral Valve  Mild mitral annular calcification is present. Trace mitral insufficiency is  present.     Aortic Valve  The valve leaflet    PAST MEDICAL HISTORY:  Past Medical History:   Diagnosis Date    Anemia     Biliary stricture of transplanted liver (H) 2018    BPH (benign prostatic hyperplasia)     Cancer (H)     hepatocellualr carcinoma    Cirrhosis of liver (H) 2018    Diabetes (H)     Enlarged prostate     Hypothyroidism     Impotence of organic origin 2020    Inguinal hernia     Repaired with mesh on 18    Liver lesion 2018    Liver transplanted (H) 2018     donor liver transplant    Portal vein thrombosis     on path explant    Postoperative atrial fibrillation (H) 2018    Prostate cancer (H)     TB lung, latent     Treated        CURRENT MEDICATIONS:  Current Outpatient Medications   Medication Sig Dispense Refill    Alcohol Swabs PADS Use to swab the area of the injection or fam as directed Per insurance coverage 200 each 1    amoxicillin-clavulanate (AUGMENTIN) 875-125 MG tablet Take 1 tablet by mouth 2 times daily 20 tablet 0    apixaban ANTICOAGULANT (ELIQUIS) 5 MG tablet Take 1 tablet (5 mg) by mouth 2 times daily 60 tablet 1    atorvastatin (LIPITOR) 40 MG tablet Take 1 tablet (40 mg) by mouth daily 90 tablet 3    blood glucose (NO BRAND SPECIFIED) lancets standard Use to test blood sugar 3 times daily or as directed.Please dispense insurance covered brand 300 Lancet 3    blood  glucose (NO BRAND SPECIFIED) lancets standard To use to test glucose level in the blood Use to test blood sugar  3  times daily as directed. To accompany glucose monitor brands per insurance coverage. 200 each 1    blood glucose (NO BRAND SPECIFIED) test strip Use to test blood sugar 3 times daily or as directed. Please dispense insurance covered brand 300 strip 3    blood glucose (NO BRAND SPECIFIED) test strip To use to test glucose level in the blood Use to test blood sugar  3 times daily as directed. To accompany glucose monitor brands per insurance coverage. 100 strip 3    blood glucose (ONE TOUCH SURESOFT) lancing device Lancing device to be used with lancets. 1 each 0    blood glucose monitoring (NO BRAND SPECIFIED) meter device kit Use to test blood sugar 3 times daily or as directed. Please dispense insurance covered brand 1 kit 0    blood glucose monitoring (NO BRAND SPECIFIED) meter device kit Use as directed Per insurance coverage 1 kit 0    blood glucose monitoring (ONE TOUCH ULTRA 2) meter device kit Use to test blood sugar 2 times daily or as directed. 1 kit 0    blood glucose monitoring (ONE TOUCH ULTRASOFT) lancets Use to test blood sugar 2 times daily. 100 each 6    diltiazem ER COATED BEADS (CARDIZEM CD/CARTIA XT) 120 MG 24 hr capsule Take 1 capsule (120 mg) by mouth daily 30 capsule 1    doxycycline hyclate (VIBRAMYCIN) 100 MG capsule Take 1 capsule (100 mg) by mouth 2 times daily 20 capsule 0    insulin glargine (LANTUS PEN) 100 UNIT/ML pen Inject 28 units subcutaneous at hs. 20 mL 3    insulin glargine (LANTUS SOLOSTAR) 100 UNIT/ML pen Inject 20 Units Subcutaneous at bedtime 15 mL 1    insulin pen needle (31G X 5 MM) 31G X 5 MM miscellaneous Use one  pen needles daily or as directed. 100 each 3    levofloxacin (LEVAQUIN) 500 MG tablet Take 1 tablet (500 mg) by mouth daily 3 tablet 0    levothyroxine (SYNTHROID/LEVOTHROID) 150 MCG tablet Take 1 tablet (150 mcg) by mouth daily 90 tablet 3     metFORMIN (GLUCOPHAGE XR) 500 MG 24 hr tablet Take 1,000 mg by mouth 2 times daily (with meals)      methocarbamol (ROBAXIN) 500 MG tablet Take 500 mg by mouth 4 times daily as needed for muscle spasms      mycophenolate (GENERIC EQUIVALENT) 500 MG tablet Take 500 mg by mouth 2 times daily      predniSONE (DELTASONE) 5 MG tablet Take 1 tablet (5 mg) by mouth daily 90 tablet 1    sulfamethoxazole-trimethoprim (BACTRIM DS) 800-160 MG tablet Take 1 tablet by mouth 2 times daily 14 tablet 0    ursodiol (ACTIGALL) 250 MG tablet Take 1 tablet (250 mg) by mouth 2 times daily 180 tablet 3    Vitamin D3 (CHOLECALCIFEROL) 25 mcg (1000 units) tablet Take 2 tablets (50 mcg) by mouth daily 180 tablet 3    aspirin (ASA) 81 MG chewable tablet Take 1 tablet (81 mg) by mouth daily for 360 days (Patient not taking: Reported on 7/2/2024) 90 tablet 3    hydrALAZINE (APRESOLINE) 25 MG tablet Take 1 tablet (25 mg) by mouth 2 times daily (Patient not taking: Reported on 7/2/2024) 180 tablet 3    mycophenolate (GENERIC EQUIVALENT) 250 MG capsule Take 2 capsules (500 mg) by mouth 2 times daily for 90 days 90 capsule 3       PAST SURGICAL HISTORY:  Past Surgical History:   Procedure Laterality Date    APPENDECTOMY      BRONCHOSCOPY (RIGID OR FLEXIBLE), DIAGNOSTIC N/A 03/05/2024    Procedure: BRONCHOSCOPY, WITH BRONCHOALVEOLAR LAVAGE;  Surgeon: Jimmy Farley MD;  Location:  GI    COLONOSCOPY      2018    CV CORONARY ANGIOGRAM N/A 10/13/2021    Procedure: CV CORONARY ANGIOGRAM;  Surgeon: Yaya Hampton MD;  Location: [facility] CARDIAC CATH LAB    CV CORONARY LITHOTRIPSY PCI N/A 10/13/2021    Procedure: CV Coronary Lithotripsy PCI;  Surgeon: Yaya Hampton MD;  Location: [facility] CARDIAC CATH LAB    CV INSTANTANEOUS WAVE-FREE RATIO N/A 10/13/2021    Procedure: Instantaneous Wave-Free Ratio;  Surgeon: Yaya Hampton MD;  Location: [facility] CARDIAC CATH LAB    CV INTRAVASULAR ULTRASOUND N/A  "10/13/2021    Procedure: Intravascular Ultrasound;  Surgeon: Yaya Hampton MD;  Location: [facility] CARDIAC CATH LAB    CV PCI STENT DRUG ELUTING N/A 10/13/2021    Procedure: Percutaneous Coronary Intervention Stent Drug Eluting;  Surgeon: Yaya Hampton MD;  Location:  HEART CARDIAC CATH LAB    DAVINCI PROSTATECTOMY N/A 2020    Procedure: Robot-assisted laparoscopic radical prostatectomy, Bladder biopsy;  Surgeon: Kenrick Casiano MD;  Location: [facility]    ENDOSCOPIC RETROGRADE CHOLANGIOPANCREATOGRAM N/A 2018    Procedure: ENDOSCOPIC RETROGRADE CHOLANGIOPANCREATOGRAM, with Billary Sphincterotomy and Billary Stent Placement;  Surgeon: Omero Lawler MD;  Location: UU OR    ENDOSCOPIC RETROGRADE CHOLANGIOPANCREATOGRAM  2019    Procedure: COMBINED ENDOSCOPIC RETROGRADE CHOLANGIOPANCREATOGRAPHY, Bile Duct Stent Exchange;  Surgeon: Omero Lawler MD;  Location: UU OR    ENDOSCOPIC RETROGRADE CHOLANGIOPANCREATOGRAM N/A 2019    Procedure: ENDOSCOPIC RETROGRADE CHOLANGIOPANCREATOGRAPHY, WITH BILIARY STENT REMOVAL AND STONE EXTRACTION;  Surgeon: Omero Lawler MD;  Location: [facility] OR    HERNIORRHAPHY INGUINAL  2018    Procedure: Herniorrhaphy inguinal;  Surgeon: Emil Echevarria MD;  Location: [facility] OR    IR LIVER BIOPSY PERCUTANEOUS  2018    LAPAROTOMY EXPLORATORY      per the patient \"for infection in the LLQ\"     PICC INSERTION Right 2024    47 cm basilic    TRANSPLANT LIVER RECIPIENT  DONOR N/A 2018    Procedure: TRANSPLANT LIVER RECIPIENT  DONOR;  Surgeon: Emil Echevarria MD;  Location: [facility] OR       ALLERGIES:   No Known Allergies    FAMILY HISTORY:  Family History   Problem Relation Age of Onset    Memory loss Mother     Liver Disease Brother     Skin Cancer No family hx of     Melanoma No family hx of      SOCIAL HISTORY:  Social History     Tobacco Use    Smoking status: Former     " Current packs/day: 0.00     Average packs/day: (1.4 ttl pk-yrs)     Types: Cigarettes, Cigars     Start date: 1970     Quit date: 3/14/2018     Years since quittin.3    Smokeless tobacco: Never    Tobacco comments:     Quit smoking 2018, one cigarette after dinner   Vaping Use    Vaping status: Never Used   Substance Use Topics    Alcohol use: No     Comment: Quit drinking 2018    Drug use: No       ROS:   Constitutional: No fever, chills, or sweats. Weight stable.   ENT: No visual disturbance, ear ache, epistaxis, sore throat.   Cardiovascular: As per HPI.   Respiratory: No cough, hemoptysis, non-smoker.    : No hematuria.   Integument: Negative.   Psychiatric: Negative.   Hematologic: , no easy bleeding.  Neuro: Negative.   Endocrinology: No significant heat or cold intolerance; DM2  Musculoskeletal: No myalgia or other rheumatologic complaint.    Exam:  There were no vitals taken for this visit.  GENERAL APPEARANCE: healthy, alert and no distress  HEENT: no icterus,  no central cyanosis  NECK: no adenopathy, no asymmetry, masses, or scars, thyroid normal to palpation and no bruits, JVP not elevated  RESPIRATORY: l no rales, rhonchi or wheezes, no use of accessory muscles, no retractions, respirations are unlabored, normal respiratory rate  CARDIOVASCULAR: regular rhythm, normal S1 with physiologic split S2, no S3 or S4 and no murmur, click or rub, precordium quiet with normal PMI.  ABDOMEN: soft, non tender, without hepatosplenomegaly, no masses palpable, bowel sounds normal, aorta not enlarged by palpation, no abdominal bruits  EXTREMITIES: peripheral pulses normal, no edema, no bruits  NEURO: alert and oriented to person/place/time, normal speech, gait and affect  SKIN: no ecchymoses, no rashes    Labs:  CBC RESULTS:   Lab Results   Component Value Date    WBC 7.4 2024    WBC 7.0 2021    RBC 4.81 2024    RBC 4.83 2021    HGB 13.3 2024    HGB 14.0 2021     HCT 40.9 06/11/2024    HCT 40.1 07/07/2021    MCV 85 06/11/2024    MCV 83 07/07/2021    MCH 27.7 06/11/2024    MCH 29.0 07/07/2021    MCHC 32.5 06/11/2024    MCHC 34.9 07/07/2021    RDW 14.0 06/11/2024    RDW 12.2 07/07/2021     06/11/2024     07/07/2021       BMP RESULTS:  Lab Results   Component Value Date     06/11/2024     07/07/2021    POTASSIUM 4.9 06/11/2024    POTASSIUM 5.0 07/19/2023    POTASSIUM 4.5 07/27/2022    POTASSIUM 4.7 07/07/2021    CHLORIDE 104 06/11/2024    CHLORIDE 106 07/27/2022    CHLORIDE 106 07/07/2021    CO2 26 06/11/2024    CO2 26 07/27/2022    CO2 25 07/07/2021    ANIONGAP 7 06/11/2024    ANIONGAP 6 07/27/2022    ANIONGAP 6 07/07/2021     (H) 06/11/2024     (H) 04/17/2024     (H) 07/27/2022     (H) 07/07/2021    BUN 20.1 06/11/2024    BUN 19 07/27/2022    BUN 26 07/07/2021    CR 0.91 06/11/2024    CR 0.79 07/07/2021    GFRESTIMATED 90 06/11/2024    GFRESTIMATED >90 07/07/2021    GFRESTBLACK >90 07/07/2021    YELENA 9.1 06/11/2024    YELENA 8.3 (L) 07/07/2021        INR RESULTS:  Lab Results   Component Value Date    INR 1.21 (H) 04/13/2024    INR 1.07 04/12/2024    INR 1.08 02/29/2024    INR 0.92 03/11/2023    INR 1.02 04/23/2021    INR 1.14 12/31/2018    INR 1.36 (H) 12/23/2018    INR 1.61 (H) 12/23/2018       Procedures:  PULMONARY FUNCTION TESTS:       Latest Ref Rng & Units 6/18/2018     9:44 AM   PFT   FVC L 3.81    FEV1 L 2.84    FVC% % 103    FEV1% % 99          ECHOCARDIOGRAM:   No results found for this or any previous visit (from the past 8760 hour(s)).      Assessment and Plan:   1.  Chronic longstanding atrial fibrillation--not good candidate for cardioversion given length of time in A-fib and left atrial enlargement  2.  Chronic coronary artery disease    Plan  1.  Increase diltiazem from 120 daily to 240 long-acting daily for better rate control  2.  Continue anticoagulation  3.  Follow-up clinic visit about 6 months  time    Total elapsed time with chart review, clinic visit and documentation 60 minutes    I very much appreciated the opportunity to see and assess Loy Limon in the clinic today . Please do not hesitate to contact my office if you have any questions or concerns.      Rolly Plascencia MD  Cardiac Arrhythmia Service  [facility]  [phone]       CC  YARI REDD

## 2024-07-02 NOTE — NURSING NOTE
Chief Complaint   Patient presents with    New Patient     NEW- hospital follow up       Vitals were taken, medications reconciled, and EKG was performed.    Colin Beckwith, EMT  3:17 PM

## 2024-07-02 NOTE — LETTER
7/2/2024      RE: oLy Limon  [address]  [facility] [address]       Dear Colleague,    Thank you for the opportunity to participate in the care of your patient, Loy Limon, at the [facility] [place] [facility]. Please see a copy of my visit note below.    HPI:   Mr Limon is a very pleasant 69-year-old male with known diabetes mellitus and ischemic heart disease.  He is in the clinic today with an  via telephone.    Mr. Limon was recently admitted to [facility] with an NSTEMI and uncontrolled A-fib.  He was begun on diltiazem and Eliquis.  He is currently here for follow-up.  In this regard he had a Zio patch recording for 14 days recently and it showed 100% atrial fibrillation with periods of very rapid rates despite treatment with diltiazem 120 daily.    Patient has known coronary artery disease and is status post PCI to his mid LAD and has nonobstructive disease in D1, D2 and RCA.      At today's visit Mr. Limon has no cardiac complaints.  He does note some chronic pulmonary complaints with difficulty listening phlegm.  I suggested he raise this issue with his PCP as it may merit some change in his pulmonary medications.    In terms of his cardiac status we reviewed his echocardiogram from his recent hospital admission and is summarized below.  In essence his LV function is well-preserved.  Our recommendation will be to increase his diltiazem dose to 240 mg once daily in order to try and achieve improved heart rate control.  Depending on his symptoms I will plan to repeat his Zio patch after our next clinic visit about 6 months time.  I have advised Mr. Porras to feel free to contact us should there be any change in his symptoms that is of worry to him.  He voiced understanding and agreement and this was also repeated using the .  Findings were provided in written form  He was able to get  ECHO 4/24  Interpretation  Summary  Global and regional left ventricular function is normal with an EF of 55-60%.  Right ventricular function, chamber size, wall motion, and thickness are  normal.  Severe left atrial enlargement is present.  The inferior vena cava is normal.  No pericardial effusion is present.  There is no prior study for direct comparison.    Left Ventricle  Global and regional left ventricular function is normal with an EF of 55-60%.  Left ventricular size is normal. Possible apical hypertrophy. Diastolic  function not assessed due to atrial fibrillation. No regional wall motion  abnormalities are seen.     Right Ventricle  Right ventricular function, chamber size, wall motion, and thickness are  normal.     Atria  The right atria appears normal. Severe left atrial enlargement is present.     Mitral Valve  Mild mitral annular calcification is present. Trace mitral insufficiency is  present.     Aortic Valve  The valve leaflet    PAST MEDICAL HISTORY:  Past Medical History:   Diagnosis Date     Anemia      Biliary stricture of transplanted liver (H) 2018     BPH (benign prostatic hyperplasia)      Cancer (H)     hepatocellualr carcinoma     Cirrhosis of liver (H) 2018     Diabetes (H)      Enlarged prostate      Hypothyroidism      Impotence of organic origin 2020     Inguinal hernia     Repaired with mesh on 18     Liver lesion 2018     Liver transplanted (H) 2018     donor liver transplant     Portal vein thrombosis     on path explant     Postoperative atrial fibrillation (H) 2018     Prostate cancer (H)      TB lung, latent     Treated        CURRENT MEDICATIONS:  Current Outpatient Medications   Medication Sig Dispense Refill     Alcohol Swabs PADS Use to swab the area of the injection or fam as directed Per insurance coverage 200 each 1     amoxicillin-clavulanate (AUGMENTIN) 875-125 MG tablet Take 1 tablet by mouth 2 times daily 20 tablet 0     apixaban  ANTICOAGULANT (ELIQUIS) 5 MG tablet Take 1 tablet (5 mg) by mouth 2 times daily 60 tablet 1     atorvastatin (LIPITOR) 40 MG tablet Take 1 tablet (40 mg) by mouth daily 90 tablet 3     blood glucose (NO BRAND SPECIFIED) lancets standard Use to test blood sugar 3 times daily or as directed.Please dispense insurance covered brand 300 Lancet 3     blood glucose (NO BRAND SPECIFIED) lancets standard To use to test glucose level in the blood Use to test blood sugar  3  times daily as directed. To accompany glucose monitor brands per insurance coverage. 200 each 1     blood glucose (NO BRAND SPECIFIED) test strip Use to test blood sugar 3 times daily or as directed. Please dispense insurance covered brand 300 strip 3     blood glucose (NO BRAND SPECIFIED) test strip To use to test glucose level in the blood Use to test blood sugar  3 times daily as directed. To accompany glucose monitor brands per insurance coverage. 100 strip 3     blood glucose (ONE TOUCH SURESOFT) lancing device Lancing device to be used with lancets. 1 each 0     blood glucose monitoring (NO BRAND SPECIFIED) meter device kit Use to test blood sugar 3 times daily or as directed. Please dispense insurance covered brand 1 kit 0     blood glucose monitoring (NO BRAND SPECIFIED) meter device kit Use as directed Per insurance coverage 1 kit 0     blood glucose monitoring (ONE TOUCH ULTRA 2) meter device kit Use to test blood sugar 2 times daily or as directed. 1 kit 0     blood glucose monitoring (ONE TOUCH ULTRASOFT) lancets Use to test blood sugar 2 times daily. 100 each 6     diltiazem ER COATED BEADS (CARDIZEM CD/CARTIA XT) 120 MG 24 hr capsule Take 1 capsule (120 mg) by mouth daily 30 capsule 1     doxycycline hyclate (VIBRAMYCIN) 100 MG capsule Take 1 capsule (100 mg) by mouth 2 times daily 20 capsule 0     insulin glargine (LANTUS PEN) 100 UNIT/ML pen Inject 28 units subcutaneous at hs. 20 mL 3     insulin glargine (LANTUS SOLOSTAR) 100 UNIT/ML pen  Inject 20 Units Subcutaneous at bedtime 15 mL 1     insulin pen needle (31G X 5 MM) 31G X 5 MM miscellaneous Use one  pen needles daily or as directed. 100 each 3     levofloxacin (LEVAQUIN) 500 MG tablet Take 1 tablet (500 mg) by mouth daily 3 tablet 0     levothyroxine (SYNTHROID/LEVOTHROID) 150 MCG tablet Take 1 tablet (150 mcg) by mouth daily 90 tablet 3     metFORMIN (GLUCOPHAGE XR) 500 MG 24 hr tablet Take 1,000 mg by mouth 2 times daily (with meals)       methocarbamol (ROBAXIN) 500 MG tablet Take 500 mg by mouth 4 times daily as needed for muscle spasms       mycophenolate (GENERIC EQUIVALENT) 500 MG tablet Take 500 mg by mouth 2 times daily       predniSONE (DELTASONE) 5 MG tablet Take 1 tablet (5 mg) by mouth daily 90 tablet 1     sulfamethoxazole-trimethoprim (BACTRIM DS) 800-160 MG tablet Take 1 tablet by mouth 2 times daily 14 tablet 0     ursodiol (ACTIGALL) 250 MG tablet Take 1 tablet (250 mg) by mouth 2 times daily 180 tablet 3     Vitamin D3 (CHOLECALCIFEROL) 25 mcg (1000 units) tablet Take 2 tablets (50 mcg) by mouth daily 180 tablet 3     aspirin (ASA) 81 MG chewable tablet Take 1 tablet (81 mg) by mouth daily for 360 days (Patient not taking: Reported on 7/2/2024) 90 tablet 3     hydrALAZINE (APRESOLINE) 25 MG tablet Take 1 tablet (25 mg) by mouth 2 times daily (Patient not taking: Reported on 7/2/2024) 180 tablet 3     mycophenolate (GENERIC EQUIVALENT) 250 MG capsule Take 2 capsules (500 mg) by mouth 2 times daily for 90 days 90 capsule 3       PAST SURGICAL HISTORY:  Past Surgical History:   Procedure Laterality Date     APPENDECTOMY       BRONCHOSCOPY (RIGID OR FLEXIBLE), DIAGNOSTIC N/A 03/05/2024    Procedure: BRONCHOSCOPY, WITH BRONCHOALVEOLAR LAVAGE;  Surgeon: Jimmy Farley MD;  Location:  GI     COLONOSCOPY      2018     CV CORONARY ANGIOGRAM N/A 10/13/2021    Procedure: CV CORONARY ANGIOGRAM;  Surgeon: Yaya Hampton MD;  Location:  HEART CARDIAC CATH LAB     CV  "CORONARY LITHOTRIPSY PCI N/A 10/13/2021    Procedure: CV Coronary Lithotripsy PCI;  Surgeon: Yaya Hampton MD;  Location:  HEART CARDIAC CATH LAB     CV INSTANTANEOUS WAVE-FREE RATIO N/A 10/13/2021    Procedure: Instantaneous Wave-Free Ratio;  Surgeon: Yaya Hampton MD;  Location:  HEART CARDIAC CATH LAB     CV INTRAVASULAR ULTRASOUND N/A 10/13/2021    Procedure: Intravascular Ultrasound;  Surgeon: Yaya Hampton MD;  Location:  HEART CARDIAC CATH LAB     CV PCI STENT DRUG ELUTING N/A 10/13/2021    Procedure: Percutaneous Coronary Intervention Stent Drug Eluting;  Surgeon: Yaya Hampton MD;  [facility]     DAVINCI PROSTATECTOMY N/A 2020    Procedure: Robot-assisted laparoscopic radical prostatectomy, Bladder biopsy;  Surgeon: Kenrick Casiano MD;  Location: [facility]     ENDOSCOPIC RETROGRADE CHOLANGIOPANCREATOGRAM N/A 2018    Procedure: ENDOSCOPIC RETROGRADE CHOLANGIOPANCREATOGRAM, with Billary Sphincterotomy and Billary Stent Placement;  Surgeon: Omero Lawler MD;  [facility]: [facility]     ENDOSCOPIC RETROGRADE CHOLANGIOPANCREATOGRAM  2019    Procedure: COMBINED ENDOSCOPIC RETROGRADE CHOLANGIOPANCREATOGRAPHY, Bile Duct Stent Exchange;  Surgeon: Omero Lawler MD;  [facility]     ENDOSCOPIC RETROGRADE CHOLANGIOPANCREATOGRAM N/A 2019    Procedure: ENDOSCOPIC RETROGRADE CHOLANGIOPANCREATOGRAPHY, WITH BILIARY STENT REMOVAL AND STONE EXTRACTION;  Surgeon: Omero Lawler MD;  [facility]     HERNIORRHAPHY INGUINAL  2018    Procedure: Herniorrhaphy inguinal;  Surgeon: Emil Echevarria MD;  [facility]: [facility] OR     IR LIVER BIOPSY PERCUTANEOUS  2018     LAPAROTOMY EXPLORATORY      per the patient \"for infection in the LLQ\"      PICC INSERTION Right 2024    47 cm basilic     TRANSPLANT LIVER RECIPIENT  DONOR N/A 2018    Procedure: " TRANSPLANT LIVER RECIPIENT  DONOR;  Surgeon: Emil Echevarria MD;  Location:  OR       ALLERGIES:   No Known Allergies    FAMILY HISTORY:  Family History   Problem Relation Age of Onset     Memory loss Mother      Liver Disease Brother      Skin Cancer No family hx of      Melanoma No family hx of      SOCIAL HISTORY:  Social History     Tobacco Use     Smoking status: Former     Current packs/day: 0.00     Average packs/day: (1.4 ttl pk-yrs)     Types: Cigarettes, Cigars     Start date: 1970     Quit date: 3/14/2018     Years since quittin.3     Smokeless tobacco: Never     Tobacco comments:     Quit smoking 2018, one cigarette after dinner   Vaping Use     Vaping status: Never Used   Substance Use Topics     Alcohol use: No     Comment: Quit drinking 2018     Drug use: No       ROS:   Constitutional: No fever, chills, or sweats. Weight stable.   ENT: No visual disturbance, ear ache, epistaxis, sore throat.   Cardiovascular: As per HPI.   Respiratory: No cough, hemoptysis, non-smoker.    : No hematuria.   Integument: Negative.   Psychiatric: Negative.   Hematologic: , no easy bleeding.  Neuro: Negative.   Endocrinology: No significant heat or cold intolerance; DM2  Musculoskeletal: No myalgia or other rheumatologic complaint.    Exam:  There were no vitals taken for this visit.  GENERAL APPEARANCE: healthy, alert and no distress  HEENT: no icterus,  no central cyanosis  NECK: no adenopathy, no asymmetry, masses, or scars, thyroid normal to palpation and no bruits, JVP not elevated  RESPIRATORY: l no rales, rhonchi or wheezes, no use of accessory muscles, no retractions, respirations are unlabored, normal respiratory rate  CARDIOVASCULAR: regular rhythm, normal S1 with physiologic split S2, no S3 or S4 and no murmur, click or rub, precordium quiet with normal PMI.  ABDOMEN: soft, non tender, without hepatosplenomegaly, no masses palpable, bowel sounds normal, aorta not enlarged by  palpation, no abdominal bruits  EXTREMITIES: peripheral pulses normal, no edema, no bruits  NEURO: alert and oriented to person/place/time, normal speech, gait and affect  SKIN: no ecchymoses, no rashes    Labs:  CBC RESULTS:   Lab Results   Component Value Date    WBC 7.4 06/11/2024    WBC 7.0 07/07/2021    RBC 4.81 06/11/2024    RBC 4.83 07/07/2021    HGB 13.3 06/11/2024    HGB 14.0 07/07/2021    HCT 40.9 06/11/2024    HCT 40.1 07/07/2021    MCV 85 06/11/2024    MCV 83 07/07/2021    MCH 27.7 06/11/2024    MCH 29.0 07/07/2021    MCHC 32.5 06/11/2024    MCHC 34.9 07/07/2021    RDW 14.0 06/11/2024    RDW 12.2 07/07/2021     06/11/2024     07/07/2021       BMP RESULTS:  Lab Results   Component Value Date     06/11/2024     07/07/2021    POTASSIUM 4.9 06/11/2024    POTASSIUM 5.0 07/19/2023    POTASSIUM 4.5 07/27/2022    POTASSIUM 4.7 07/07/2021    CHLORIDE 104 06/11/2024    CHLORIDE 106 07/27/2022    CHLORIDE 106 07/07/2021    CO2 26 06/11/2024    CO2 26 07/27/2022    CO2 25 07/07/2021    ANIONGAP 7 06/11/2024    ANIONGAP 6 07/27/2022    ANIONGAP 6 07/07/2021     (H) 06/11/2024     (H) 04/17/2024     (H) 07/27/2022     (H) 07/07/2021    BUN 20.1 06/11/2024    BUN 19 07/27/2022    BUN 26 07/07/2021    CR 0.91 06/11/2024    CR 0.79 07/07/2021    GFRESTIMATED 90 06/11/2024    GFRESTIMATED >90 07/07/2021    GFRESTBLACK >90 07/07/2021    YELENA 9.1 06/11/2024    YELENA 8.3 (L) 07/07/2021        INR RESULTS:  Lab Results   Component Value Date    INR 1.21 (H) 04/13/2024    INR 1.07 04/12/2024    INR 1.08 02/29/2024    INR 0.92 03/11/2023    INR 1.02 04/23/2021    INR 1.14 12/31/2018    INR 1.36 (H) 12/23/2018    INR 1.61 (H) 12/23/2018       Procedures:  PULMONARY FUNCTION TESTS:       Latest Ref Rng & Units 6/18/2018     9:44 AM   PFT   FVC L 3.81    FEV1 L 2.84    FVC% % 103    FEV1% % 99          ECHOCARDIOGRAM:   No results found for this or any previous visit (from the  past 8760 hour(s)).      Assessment and Plan:   1.  Chronic longstanding atrial fibrillation--not good candidate for cardioversion given length of time in A-fib and left atrial enlargement  2.  Chronic coronary artery disease    Plan  1.  Increase diltiazem from 120 daily to 240 long-acting daily for better rate control  2.  Continue anticoagulation  3.  Follow-up clinic visit about 6 months time    Total elapsed time with chart review, clinic visit and documentation 60 minutes    I very much appreciated the opportunity to see and assess Loy Limon in the clinic today . Please do not hesitate to contact my office if you have any questions or concerns.      Rolly Plascencia MD  Cardiac Arrhythmia Service  [facility]  [phone]       CC  YARI REDD    Please do not hesitate to contact me if you have any questions/concerns.     Sincerely,     Rolly Plascencia MD

## 2024-07-03 LAB
ATRIAL RATE - MUSE: 267 BPM
DIASTOLIC BLOOD PRESSURE - MUSE: NORMAL MMHG
INTERPRETATION ECG - MUSE: NORMAL
P AXIS - MUSE: NORMAL DEGREES
PR INTERVAL - MUSE: NORMAL MS
QRS DURATION - MUSE: 90 MS
QT - MUSE: 394 MS
QTC - MUSE: 487 MS
R AXIS - MUSE: -22 DEGREES
SYSTOLIC BLOOD PRESSURE - MUSE: NORMAL MMHG
T AXIS - MUSE: 134 DEGREES
VENTRICULAR RATE- MUSE: 92 BPM

## 2024-07-08 NOTE — CONFIDENTIAL NOTE
RECORDS RECEIVED FROM: internal    DATE RECEIVED: 8.6.24    NOTES STATUS DETAILS   OFFICE NOTE from referring provider internal    Jaswinder Anne MD      MEDICATION LIST internal     IMAGING  (NEED IMAGES AND REPORTS)     CT SCAN internal  5.31.24, 4.14.24, 3.22.24, 3.3.24,    CHEST XRAY (CXR) internal  5.2.24, 4.12.24, 3.6.24, 3.2.24, 2.28.24,    TESTS     PULMONARY FUNCTION TESTING (PFT) internal  Scheduled 7/30/24

## 2024-07-16 DIAGNOSIS — E11.9 DM TYPE 2, GOAL HBA1C 7%-8% (H): ICD-10-CM

## 2024-07-17 RX ORDER — ATORVASTATIN CALCIUM 40 MG/1
40 TABLET, FILM COATED ORAL DAILY
Qty: 90 TABLET | Refills: 3 | OUTPATIENT
Start: 2024-07-17

## 2024-07-18 ENCOUNTER — PRE VISIT (OUTPATIENT)
Dept: UROLOGY | Facility: CLINIC | Age: 69
End: 2024-07-18
Payer: COMMERCIAL

## 2024-07-18 NOTE — TELEPHONE ENCOUNTER
Reason for visit: 1 year re-evaluation     Relevant information: hx of prostate cancer, gross hematuria, stress incontinence    Records/imaging/labs/orders: all records available    Pt called: no need for a call    At Rooming:  Standard rooming      Dominic Baeza  7/18/2024  12:29 PM

## 2024-07-19 DIAGNOSIS — Z94.4 LIVER REPLACED BY TRANSPLANT (H): Primary | ICD-10-CM

## 2024-07-23 ENCOUNTER — OFFICE VISIT (OUTPATIENT)
Dept: INFECTIOUS DISEASES | Facility: CLINIC | Age: 69
End: 2024-07-23
Attending: INTERNAL MEDICINE
Payer: COMMERCIAL

## 2024-07-23 VITALS
TEMPERATURE: 98.3 F | SYSTOLIC BLOOD PRESSURE: 159 MMHG | DIASTOLIC BLOOD PRESSURE: 90 MMHG | BODY MASS INDEX: 29.03 KG/M2 | OXYGEN SATURATION: 93 % | HEART RATE: 65 BPM | WEIGHT: 196 LBS

## 2024-07-23 DIAGNOSIS — Z94.2 LUNG REPLACED BY TRANSPLANT (H): Primary | ICD-10-CM

## 2024-07-23 DIAGNOSIS — R91.8 PULMONARY INFILTRATES: ICD-10-CM

## 2024-07-23 DIAGNOSIS — E11.9 DM TYPE 2, GOAL HBA1C 7%-8% (H): ICD-10-CM

## 2024-07-23 LAB
BACTERIA SPEC CULT: NORMAL
GRAM STAIN RESULT: NORMAL

## 2024-07-23 PROCEDURE — G2211 COMPLEX E/M VISIT ADD ON: HCPCS | Performed by: INTERNAL MEDICINE

## 2024-07-23 PROCEDURE — 87206 SMEAR FLUORESCENT/ACID STAI: CPT | Performed by: INTERNAL MEDICINE

## 2024-07-23 PROCEDURE — G0463 HOSPITAL OUTPT CLINIC VISIT: HCPCS | Performed by: INTERNAL MEDICINE

## 2024-07-23 PROCEDURE — 99215 OFFICE O/P EST HI 40 MIN: CPT | Performed by: INTERNAL MEDICINE

## 2024-07-23 PROCEDURE — 87205 SMEAR GRAM STAIN: CPT | Performed by: INTERNAL MEDICINE

## 2024-07-23 ASSESSMENT — PAIN SCALES - GENERAL: PAINLEVEL: NO PAIN (0)

## 2024-07-23 NOTE — LETTER
7/23/2024       RE: Loy Limon  [address]  [place] [address]     Dear Colleague,    Thank you for referring your patient, Loy Limon, to the [facility] IN[facility] DISE[facility] [place] at [facility]. Please see a copy of my visit note below.     [facility]    Division of Infectious Diseases and International Medicine    TRANSPLANT INFECTIOUS DISEASE INPATIENT CONSULTATION NOTE   Patient:  Loy Limon, Date of birth 1955, Medical record number [id number]  Referred by: No ref. provider found   Reason for Consult: Cough, GGOs on CT chest        Assessment and Recommendations   Recommendations  Keep pulmonary clinic visit on 8/6/24 as planned. We discussed the timing of this and the importance of keeping this visit.   Repeat CT chest ordered to assess current status of pulmonary infiltrates   Obtain sputum culture and AFB today.   Negative infectious work-up has included serum Aspergillus galactomannan, CrAG, 1,3 BD glucan, serum/urine histo, and RVP testing  Will address PCV-20 and RSV vaccines once acute issues have resolved.     Follow-up with me in ~6 weeks for ongoing work-up of pulmonary infiltrates    Alessandra Capone MD  [phone]    I spent >60 minutes reviewing the patient's medical record, examining the patient, documenting and coordinating care.     The longitudinal plan of care for his pulmonary infiltrates in setting of previous renal transplant as documented were addressed during this visit. Due to the added complexity in care, I will continue to support Loy in the subsequent management and with ongoing continuity of care.    Assessment  Mr. Limon is a 67 yo gentleman with history of prostate cancer s/p prostatectomy (2020), LTBI s/p rifampin x3 months and INH x2 months (2019), and EtOH cirrhosis c/b HCC (s/p chemoembolization and microwave ablationin 2018). He ultimately  underwent DDLT (2018). I am seeing him in follow-up today for ongoing work-up of GGOs/nodules seen on CT chest.      Cough, GGOs seen on CT chest (2024)  CT chest showing patchy bilateral groundglass opacities with lower lobe dominant starting on 3/3/2024.  He did undergo BAL on 3/4/2024, which had negative galactomannan, PJP, and all cultures including nocardia and AFB.  He also had sputum AFB x 3 during the hospital admission, all of which were negative. Repeat CT chest on 3/22/2024 with improving perihilar interstitial groundglass opacities.  CT chest from 2024 showed continued peribronchovascular GGO's.  His most recent CT chest from 2024 now shows left upper lobe predominant peribronchovascular groundglass opacities.      At this point, he still has significant sputum production that wakes him from sleep at night. At our last visit on 24, he had extensive infectious diseases testing, including negative Aspergillus galactomannan, serum CrAG, 1,3 BD glucan, serum/urine histo, and RVP testing. He has 1 sputum AFB culture that is also negative. Unfortunately, he missed his pulmonology visit on , but has been rescheduled for . In the meantime, I will send for repeat sputum culture (AFB, bacterial). I will also have him repeat a CT chest, since it has been ~2 months since his last one. Of note, he does have a history of LTBI, but completed treatment in 2019. Thus, he is low risk for recurrent tuberculosis.     Other Infectious Disease issues include:  - QTc interval: 502 (24)  - Bacterial prophylaxis: None  - Pneumocystis prophylaxis: Not applicable  - Serostatus & viral prophylaxis: CMV D-/R+; EBV D+/R+   - Fungal prophylaxis: Not applicable  - Isolation status: Contact (ESBL history)  - Ig,143 (2024)  - Code status: Full Code  - Antibiotic allergies: None     Recent Labs   Lab Test 24  2117   IGG 1,143      Prior infectious diseases issues   + Quant 2018. Was  initially started on [id number]/kg PO x4 months on 8/21/2018 (end date ~12/20/2024) but only completed 2.5 months worth. On ID clinic visit with Dr Clancy on 2/5/2019, was transitioned over to INH 300mg daily (due to rifampin DDI with transplant immunosuppressants) which he took for an additional ~2 months with end date on ~4/9/2019.   ESBL E coli Pyelonephritis: Admitted from 2/28-3/9/24 with ESBL E coli pyelonephritis. Treated with 14 days of ertapenem (3/1/2024-3/14/2024).    CAP: Occurred during 2/28-3/9/24 admission. BAL negative for fungal etiology. Given azithro x3 days and ertapenem (see above). AFB x3 negative.   Admission for fevers, unexplained sepsis (4/12-4/17/24): Admitted with hypotension, fevers, leukocytosis. CT C/A/P with some lower lobe peribronchovascular GGOs, but these were actually improved from CT chest on 3/22/24. UA relatively unremarkable and urine culture with mixed urogenital luc. Work-up included negative blood cultures, urine culture. Ultimately, improved with 7 days of antibiotics (CTX--> levofloxacin).        Interval events    Since last seen by me in clinic on 6/11/24, he has continued to have phlegm, which he feels like sticks in his throat and won't come out. It does make him feel like choking. Especially feels this when he is sleeping. He does cough some things up sometime and it is yellow. He does feel like his symptoms have overall improved, but still having the phlegm issues. No fevers/chills/night sweats.     Was seen by oncology on 6/13 for routine visit. He is >5 years out from transplant with negative MRIs. Has planned to stop annual MRI.     Abx:   Doxycycline: 5/22-6/1  Augmentin: ~6/8-6/18       Other Medical History:     Allergies Patient has no known allergies.    Past Medical History  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/28/2018    BPH (benign prostatic hyperplasia)     Cancer (H)      hepatocellualr carcinoma    Cirrhosis of liver (H) 2018    Diabetes (H)     Enlarged prostate     Hypothyroidism     Impotence of organic origin 2020    Inguinal hernia     Repaired with mesh on 18    Liver lesion 2018    Liver transplanted (H) 2018     donor liver transplant    Portal vein thrombosis     on path explant    Postoperative atrial fibrillation (H) 2018    Prostate cancer (H)     TB lung, latent     Treated     PastSurgical History   has a past surgical history that includes appendectomy; colonoscopy; Transplant liver recipient  donor (N/A, 2018); Herniorrhaphy inguinal (2018); Endoscopic retrograde cholangiopancreatogram (N/A, 2018); IR Liver Biopsy Percutaneous (2018); Endoscopic retrograde cholangiopancreatogram (2019); Endoscopic retrograde cholangiopancreatogram (N/A, 2019); Laparotomy exploratory; Davinci prostatectomy (N/A, 2020); Coronary Angiogram (N/A, 10/13/2021); Instantaneous Wave-Free Ratio (N/A, 10/13/2021); Percutaneous Coronary Intervention Stent (N/A, 10/13/2021); Intravascular Ultrasound (N/A, 10/13/2021); Percutaneous Coronary Intervention - Lithotripsy (N/A, 10/13/2021); Bronchoscopy (rigid or flexible), diagnostic (N/A, 2024); and picc insertion (Right, 2024).   Family History  Family History   Problem Relation Age of Onset    Memory loss Mother     Liver Disease Brother     Skin Cancer No family hx of     Melanoma No family hx of     Social History  He reports that he quit smoking about 6 years ago. His smoking use included cigarettes and cigars. He started smoking about 53 years ago. He has a 1.4 pack-year smoking history. He has never used smokeless tobacco. He reports that he does not drink alcohol and does not use drugs.   Notable Exposures Listed below if pertinent    Vaccination History:  Immunization History   Administered Date(s) Administered    COVID-19 12+ ()  (Pfizer) 03/20/2024    COVID-19 Bivalent 12+ (Pfizer) 10/26/2022, 05/09/2023    COVID-19 Monovalent 18+ (Moderna) 03/01/2021, 03/30/2021, 09/21/2021    COVID-19 Monovalent Booster 18+ (Moderna) 04/26/2022    DTaP, Unspecified 08/21/2018    Influenza (High Dose) 3 valent vaccine 10/24/2018, 10/16/2019    Influenza (IIV3) PF 11/15/2000    Influenza Vaccine 65+ (Fluzone HD) 09/25/2020, 10/21/2021, 10/26/2022, 03/20/2024    Pneumo Conj 13-V (2010&after) 08/21/2018, 01/13/2020    Pneumococcal 23 valent 12/04/2018    RSV Vaccine (Arexvy) 04/23/2024    TD,PF 7+ (Tenivac) 11/15/2000    TDAP (Adacel,Boostrix) 08/21/2018    TDAP Vaccine (Boostrix) 08/21/2018    Zoster recombinant adjuvanted (SHINGRIX) 08/21/2018, 10/24/2018             Physical Exam:     VITAL SIGNS:  There were no vitals taken for this visit.     GENERAL APPEARANCE: Not in acute distress. Lying in bed.     PHYSICAL EXAM:   Eyes:     no ptosis, no discharge, no scleral icterus  Mouth, Throat:     mucous membranes moist  Cardiovascular: Appears well-perfused.   Respiratory:  No wheezes/crackles noted   Skin:     Dry and intact   No rashes  Neurologic:     Higher Mental Function: Conversant, AOx4   Facial asymmetry grossly absent   is ambulatory  Psychiatric:     Appropriate           Laboratory Data:   Metabolic Studies       Recent Labs   Lab Test 06/11/24  1456 05/21/24  1633 05/02/24  1348 04/16/24  1011 04/16/24  0527 04/15/24  0959 04/15/24  0439 04/14/24  1253 04/14/24  1134 04/14/24  0840 09/08/23  2201 09/08/23  1253    137 135   < > 130*  --  135  --  133*  --    < > 142   POTASSIUM 4.9 4.6 5.1   < > 4.0  --  4.2  --  4.2  --    < > 6.0*   CHLORIDE 104 105 102   < > 99  --  101  --  103  --    < > 107   CO2 26 24 23   < > 21*  --  22  --  19*  --    < > 25   ANIONGAP 7 8 10   < > 10  --  12  --  11  --    < > 10   BUN 20.1 24.2* 17.2   < > 17.3  --  13.5  --  15.9  --    < > 23.9*   CR 0.91 1.26* 0.97   < > 0.96  --  0.94  --  0.89  --    < >  0.98   GFRESTIMATED 90 61 84   < > 85  --  87  --  >90  --    < > 83   * 260* 404*   < > 285*   < > 149*   < > 304*  --    < > 217*   A1C  --   --   --   --  8.4*  --   --   --   --   --    < >  --    YELENA 9.1 8.7* 8.7*   < > 8.2*  --  8.7*  --  8.6*  --    < > 9.3   PHOS  --   --   --   --   --   --   --   --   --   --   --  3.4   MAG  --   --  1.7  --   --   --   --   --   --   --    < >  --    LACT  --   --   --   --   --   --  1.6  --   --  0.9   < >  --    PCAL  --   --   --   --   --   --   --   --  30.80*  --    < >  --    FGTL  --   --   --   --   --   --   --   --  <31  --    < >  --     < > = values in this interval not displayed.       Hepatic Studies    Recent Labs   Lab Test 06/11/24 1456 05/21/24  1633 05/02/24  1348 03/20/24  1409 03/08/24  0526 03/05/24  0554 03/04/24  0626   BILITOTAL 0.5 0.4 0.6   < > 0.3   < > 0.3   DBIL  --   --   --   --  <0.20   < > <0.20   ALKPHOS 172* 221* 209*   < > 249*   < > 308*   PROTTOTAL 6.8 6.6 6.6   < > 5.5*   < > 5.5*   ALBUMIN 3.7 3.5 3.4*   < > 2.6*   < > 2.4*   AST 18 18 15   < > 18   < > 31   ALT 16 24 18   < > 19   < > 30   LDH  --   --   --   --   --   --  179    < > = values in this interval not displayed.       Hematology Studies      Recent Labs   Lab Test 06/11/24 1456 05/21/24  1633 05/02/24  1348 04/17/24  0607 04/16/24  0527 04/15/24  0439 07/21/21  1804 07/07/21  1059 03/12/19  0815 03/04/19  1218   WBC 7.4 7.4 6.6 5.2 4.7 6.0   < > 7.0   < > 3.6*   ANEU  --   --   --   --   --   --   --  4.6  --  2.3   ALYM  --   --   --   --   --   --   --  1.8  --  1.0   KM  --   --   --   --   --   --   --  0.4  --  0.2   AEOS  --   --   --   --   --   --   --  0.2  --  0.1   HGB 13.3 12.2* 12.0* 12.4* 12.4* 13.8   < > 14.0   < > 11.2*   HCT 40.9 37.9* 37.4* 37.2* 37.3* 42.8   < > 40.1   < > 34.4*    285 287 188 166 167   < > 208   < > 199    < > = values in this interval not displayed.       Arterial Blood Gas Testing    Recent Labs   Lab Test  "04/12/24 2131 12/23/18  0404 12/22/18 2126 12/22/18 2010 12/22/18  1914 12/22/18  1700   PH  --  7.41 7.43 7.41 7.42 7.36   PCO2  --  41 41 42 39 41   PO2  --  71* 92 221* 247* 184*   HCO3  --  26 27 26 25 23   O2PER 21 40 40 75 75 75        Medication levels    Recent Labs   Lab Test 04/15/24  1541 03/23/22  0720   VANCOMYCIN 10.5  --    TACROL  --  <1.0*       Absolute CD4, Wesley T Cells   Date Value Ref Range Status   06/11/2024 289 (L) 441 - 2,156 cells/uL Final       Inflammatory Markers    Recent Labs   Lab Test 09/08/23  1253   PSA <0.01       Immune Globulin Studies     Recent Labs   Lab Test 04/12/24 2117 03/24/21  0812   IGG 1,143 1,064   IGM  --  39   IGA  --  324       Pancreatitis testing    Recent Labs   Lab Test 04/13/24  0029 07/19/23  1411 07/10/19  0909 03/04/19  1218 02/18/19  0742 12/23/18  0404 12/22/18  0013   AMYLASE  --   --   --  44 38 46 67   LIPASE 19  --   --  42* 35* 93  --    TRIG  --  170*   < >  --   --   --   --     < > = values in this interval not displayed.       Gout Labs    No lab results found.    Clotting Studies    Recent Labs   Lab Test 04/13/24  0557 04/12/24 2117 02/29/24  1004 02/29/24  1004 03/11/23  0434   INR 1.21* 1.07  --  1.08 0.92   PTT  --  26   < > 43*  --     < > = values in this interval not displayed.         Markers    Recent Labs   Lab Test 03/07/22  1331 09/03/21  1757 02/01/21  0723 09/25/20  0820 07/03/20  1109 03/04/19  1218 10/03/18  0923 09/18/18  1524 08/21/18  1050 07/19/18  1132 06/18/18  0747 05/29/18  0754   FETO <1.5 <1.5 1.7 <1.5 <1.5 2.5 5.9 4.4 4.5 4.9 3.8 4.0       Autoimmune Testing  No lab results found.    Invalid input(s): \"PRO3\", \"C3\", \"C4\", \"VASPAN\"    Thyroid Studies     Recent Labs   Lab Test 05/02/24  1348 04/13/24  0029 03/02/24  0751 09/08/23  1253 06/14/23  1556   TSH 7.75* 0.32 5.47* 2.17 4.38*   T4 0.96  --  1.24  --  1.18       Urine Studies     Recent Labs   Lab Test 04/12/24  2134 03/20/24  1413 02/28/24 2035 " "09/08/23  2306 07/19/23  1427   URINEPH 6.0 5.5 5.5 5.5 6.0   NITRITE Negative Negative Negative Negative Negative   LEUKEST Small* Small* Small* Negative Negative   WBCU 79* 40* 49* 1 1       CSF testing   No lab results found.    Invalid input(s): \"CADAM\", \"EVPCR\", \"ENTPCR\", \"ENTEROVIRUS\"    Body fluid stats    Recent Labs   Lab Test 03/05/24  1131 12/25/18  0719   FCOL Colorless  --    FAPR Clear  --    FWBC 125  --    FNEU 6  --    FLYM 7  --    FMONO 87  --    GS  --  >10 Squamous epithelial cells/low power field indicates oral contamination. Please   recollect.  *  <25 PMNs/low power field  Moderate  Mixed gram positive luc         Microbiology:  Fungal testing  Recent Labs   Lab Test 06/11/24  1456 04/14/24  1134 03/05/24  1131 03/04/24  1129   FGTL  --  <31  --  <31   ASPGAI 0.06  --  0.14 0.05   ASPAG  --   --  Negative  --    ASPGAA Negative  --   --  Negative   COFUNG  --   --   --  <1:2       Culture   Date Value Ref Range Status   04/14/2024 No Growth  Final   04/14/2024 No Growth  Final   04/12/2024 >100,000 CFU/mL Mixture of Urogenital Luc  Final   04/12/2024 No Growth  Final   04/12/2024 No Growth  Final   03/20/2024 10,000-50,000 CFU/mL Escherichia coli ESBL (A)  Final   03/05/2024 No Legionella species isolated  Final   03/05/2024 No Growth  Final   03/05/2024 No Growth  Final   03/05/2024 1+ Normal luc  Final   02/29/2024 3+ Normal luc  Final   02/28/2024 No Growth  Final   02/28/2024 No Growth  Final   02/28/2024 50,000-100,000 CFU/mL Escherichia coli ESBL (A)  Final   03/11/2023 >100,000 CFU/mL Escherichia coli (A)  Final     GS Culture   Date Value Ref Range Status   06/11/2024 See corresponding culture for results  Final     Culture Micro   Date Value Ref Range Status   02/18/2020   Final    <10,000 colonies/mL  urogenital luc  Susceptibility testing not routinely done     12/16/2019 <10,000 colonies/mL  urogenital luc    Final   12/16/2019 Susceptibility testing not routinely " "done  Final   10/10/2019 No growth  Final   12/25/2018 (A)  Final    Canceled, Test credited  >10 Squamous epithelial cells/low power field indicates oral contamination. Please   recollect.     12/25/2018   Final    Notification of test cancellation was given to  Tahira Reis RN at 1000 12.25.18     12/24/2018 No growth  Final   12/24/2018 No growth  Final   12/22/2018 No anaerobes isolated  Final   12/22/2018 No growth  Final   12/22/2018 Culture negative after 30 days  Final   12/22/2018 No VRE isolated  Final   (    Last check of C difficile  No results found for: \"CDBPCT\"    Infection Studies to assess Diarrhea No lab results found.    Virology:  Coronavirus-19 testing    Recent Labs   Lab Test 04/12/24  2330 02/28/24  2039 10/11/21  0806 10/01/21  1141   [id number] Negative Negative Negative Negative       Respiratory virus testing    Recent Labs   Lab Test 06/11/24  1406 04/12/24  2330 03/05/24  1131   IFLUA Not Detected Not Detected Not Detected   INFZA  --  Negative  --    FLUAH1 Not Detected Not Detected Not Detected   KU4821 Not Detected Not Detected Not Detected   FLUAH3 Not Detected Not Detected Not Detected   IFLUB Not Detected Not Detected Not Detected   INFZB  --  Negative  --    PIV1 Not Detected Not Detected Not Detected   PIV2 Not Detected Not Detected Not Detected   PIV3 Not Detected Not Detected Not Detected   PIV4 Not Detected Not Detected Not Detected   IRSV  --  Negative  --    RSVA Not Detected Not Detected Not Detected   RSVB Not Detected Not Detected Not Detected   HMPV Not Detected Not Detected Not Detected   ADENOV Not Detected Not Detected Not Detected   CORONA Not Detected Not Detected Not Detected       CMV Antibody IgG   Date Value Ref Range Status   12/22/2018 >8.0 (H) 0.0 - 0.8 AI Final     Comment:     Positive  Antibody index (AI) values reflect qualitative changes in antibody   concentration that cannot be directly associated with clinical condition or   disease state.   "   07/19/2018 >8.0 (H) 0.0 - 0.8 AI Final     Comment:     Positive  Antibody index (AI) values reflect qualitative changes in antibody   concentration that cannot be directly associated with clinical condition or   disease state.       CMV Antibody IgM   Date Value Ref Range Status   12/22/2018 <0.2 0.0 - 0.8 AI Final     Comment:     Negative  Antibody index (AI) values reflect qualitative changes in antibody   concentration that cannot be directly associated with clinical condition or   disease state.       EBV Capsid Antibody IgG   Date Value Ref Range Status   12/22/2018 >8.0 (H) 0.0 - 0.8 AI Final     Comment:     Positive, suggests recent or past exposure  Antibody index (AI) values reflect qualitative changes in antibody   concentration that cannot be directly associated with clinical condition or   disease state.     07/19/2018 7.7 (H) 0.0 - 0.8 AI Final     Comment:     Positive, suggests recent or past exposure  Antibody index (AI) values reflect qualitative changes in antibody   concentration that cannot be directly associated with clinical condition or   disease state.       Toxoplasma Antibody IgG   Date Value Ref Range Status   09/18/2018 <3.0 0.0 - 7.1 IU/mL Final     Comment:     Negative- Absence of detectable Toxoplasma gondii IgG antibodies. A negative   result does not rule out acute infection.  The magnitude of the measured result is not indicative of the amount of   antibody present. The concentrations of anti-Toxoplasma gondii IgG in a given   specimen determined with assays from different manufacturers can vary due to   differences in assay methods and reagent specificity.       EBV Capsid Antibody IgM   Date Value Ref Range Status   12/22/2018 <0.2 0.0 - 0.8 AI Final     Comment:     No detectable antibody.  Antibody index (AI) values reflect qualitative changes in antibody   concentration that cannot be directly associated with clinical condition or   disease state.         CMV viral loads   "  Recent Labs   Lab Test 04/14/24  1256   CMVQNT Not Detected       CMV resistance testing  No lab results found.    No results found for: \"H6RES\"    No results found for: \"EBRES\"    No results found for: \"40907\", \"BKRES\"    Parvovirus Testing  No lab results found.    Invalid input(s): \"PRVRES\"    Adenovirus Testing  No lab results found.    Invalid input(s): \"ADENAB\", \"ADENOVIRUS\", \"ADQT\"    Hepatitis B Testing     Recent Labs   Lab Test 03/20/24  1409 12/22/18  0013   AUSAB  --  67.96*   HBCAB  --  Reactive*   HEPBANG Nonreactive  --         Hepatitis C Antibody   Date Value Ref Range Status   12/22/2018 Nonreactive NR^Nonreactive Final     Comment:     Assay performance characteristics have not been established for newborns,   infants, and children         Imaging:  No results found for this or any previous visit (from the past 48 hour(s)).       Alessandra Capone MD    "

## 2024-07-23 NOTE — NURSING NOTE
Chief Complaint   Patient presents with    Follow Up     Transplant follow up     BP (!) 159/90   Pulse 65   Temp 98.3  F (36.8  C) (Oral)   Wt 88.9 kg (196 lb)   SpO2 93%   BMI 29.03 kg/m    Bonifacio England MA on 7/23/2024 at 12:58 PM

## 2024-07-23 NOTE — PROGRESS NOTES
"Subjective     REASON FOR VISIT  Prostate cancer follow up.    HISTORY OF PRESENT ILLNESS  Mr. Loy Limon is a pleasant 69 year old gentleman with history of [place] 3+4 prostate cancer status post radical prostatectomy on 1/3/2020 with final pathology showing pT3a NX MX with negative margins.  Decipher testing was completed and he was in the high risk group (0.7).  PSA has been undetectable since his surgery, but his postoperative course has been complicated by microscopic hematuria status post workup last completed in July 2021.    Appears to have struggled mainly with irritative voiding symptoms since surgery and has undergone pelvic floor physical therapy.  Still will leak a drop when coughing.    Today:  No urinary complaints  Is having some pain over \"the skin above my liver\"  States he spoke with his primary care and they believe it is a fallout of his transplant and needs more time to heal/calm down     Objective     PHYSICAL EXAM  General: Alert, oriented, no acute distress    LABORATORY  Lab Results   Component Value Date    PSA <0.01 09/08/2023    PSA <0.01 10/21/2022    PSA <0.01 09/09/2021    PSA <0.01 07/21/2021    PSA <0.01 04/23/2021    PSA <0.01 03/24/2021    PSA <0.01 02/01/2021    PSA <0.01 09/25/2020      Radical prostatectomy pathology from 1/3/2020:    FINAL DIAGNOSIS:   A. Bladder biopsy, lesion:   - Benign stromal tissue     B. Prostate, prostatectomy:   - Prostatic adenocarcinoma, acinar type   - [place] score 7 (3+4)   - Extraprostatic extension present   - Margins free of carcinoma   - See synoptic report     IMPRESSION  [place] 3+4 prostate cancer status post radical prostatectomy on 1/3/2020, final pathology showing pT3 NX MX with extraprostatic extension but negative margins  Post-prostatectomy stress incontinence  Microscopic hematuria    It was a pleasure to see Mr. Loy Limon in clinic today in follow-up for his history of [place] 3+4 prostate cancer status-post radical " prostatectomy in January of 2020, final pathology showing [id number]. Unfortunately, there is no up to date PSA as of now, most recent is from September of 2023. With this in mind, I recommend a PSA be done today at the lab, with another to be done in 6 months (subject to change if PSA is anything other than undetectable).     Mr. Limon expressed understanding and agreement with the above discussion and plan and all of his questions were answered to his satisfaction.     PLAN  PSA today  PSA in 6 months with follow-up office visit with Dez Aragon PA-C shortly afterward    Signed by:    Dez Aragon PA-C

## 2024-07-23 NOTE — PROGRESS NOTES
[facility]    Division of Infectious Diseases and International Medicine    TRANSPLANT INFECTIOUS DISEASE INPATIENT CONSULTATION NOTE   Patient:  Loy Limon, Date of birth 1955, Medical record number [id number]  Referred by: No ref. provider found   Reason for Consult: Cough, GGOs on CT chest        Assessment and Recommendations   Recommendations  Keep pulmonary clinic visit on 8/6/24 as planned. We discussed the timing of this and the importance of keeping this visit.   Repeat CT chest ordered to assess current status of pulmonary infiltrates   Obtain sputum culture and AFB today.   Negative infectious work-up has included serum Aspergillus galactomannan, CrAG, 1,3 BD glucan, serum/urine histo, and RVP testing  Will address PCV-20 and RSV vaccines once acute issues have resolved.     Follow-up with me in ~6 weeks for ongoing work-up of pulmonary infiltrates    Alessandra Capone MD  [phone]    I spent >60 minutes reviewing the patient's medical record, examining the patient, documenting and coordinating care.     The longitudinal plan of care for his pulmonary infiltrates in setting of previous renal transplant as documented were addressed during this visit. Due to the added complexity in care, I will continue to support Loy in the subsequent management and with ongoing continuity of care.    Assessment  Mr. Limon is a 69 yo gentleman with history of prostate cancer s/p prostatectomy (2020), LTBI s/p rifampin x3 months and INH x2 months (2019), and EtOH cirrhosis c/b HCC (s/p chemoembolization and microwave ablationin 2018). He ultimately underwent DDLT (12/22/2018). I am seeing him in follow-up today for ongoing work-up of GGOs/nodules seen on CT chest.      Cough, GGOs seen on CT chest (6/6/2024)  CT chest showing patchy bilateral groundglass opacities with lower lobe dominant starting on 3/3/2024.  He did undergo BAL on 3/4/2024, which had negative  galactomannan, PJP, and all cultures including nocardia and AFB.  He also had sputum AFB x 3 during the hospital admission, all of which were negative. Repeat CT chest on 3/22/2024 with improving perihilar interstitial groundglass opacities.  CT chest from 2024 showed continued peribronchovascular GGO's.  His most recent CT chest from 2024 now shows left upper lobe predominant peribronchovascular groundglass opacities.      At this point, he still has significant sputum production that wakes him from sleep at night. At our last visit on 24, he had extensive infectious diseases testing, including negative Aspergillus galactomannan, serum CrAG, 1,3 BD glucan, serum/urine histo, and RVP testing. He has 1 sputum AFB culture that is also negative. Unfortunately, he missed his pulmonology visit on , but has been rescheduled for . In the meantime, I will send for repeat sputum culture (AFB, bacterial). I will also have him repeat a CT chest, since it has been ~2 months since his last one. Of note, he does have a history of LTBI, but completed treatment in 2019. Thus, he is low risk for recurrent tuberculosis.     Other Infectious Disease issues include:  - QTc interval: 502 (24)  - Bacterial prophylaxis: None  - Pneumocystis prophylaxis: Not applicable  - Serostatus & viral prophylaxis: CMV D-/R+; EBV D+/R+   - Fungal prophylaxis: Not applicable  - Isolation status: Contact (ESBL history)  - Ig,143 (2024)  - Code status: Full Code  - Antibiotic allergies: None     Recent Labs   Lab Test 24  2117   IGG 1,143      Prior infectious diseases issues   + Quant 2018. Was initially started on [id number]/kg PO x4 months on 2018 (end date ~2024) but only completed 2.5 months worth. On ID clinic visit with Dr Clancy on 2019, was transitioned over to INH 300mg daily (due to rifampin DDI with transplant immunosuppressants) which he took for an additional ~2 months with end  date on ~2019.   ESBL E coli Pyelonephritis: Admitted from -3/9/24 with ESBL E coli pyelonephritis. Treated with 14 days of ertapenem (3/1/2024-3/14/2024).    CAP: Occurred during -3/9/24 admission. BAL negative for fungal etiology. Given azithro x3 days and ertapenem (see above). AFB x3 negative.   Admission for fevers, unexplained sepsis (-24): Admitted with hypotension, fevers, leukocytosis. CT C/A/P with some lower lobe peribronchovascular GGOs, but these were actually improved from CT chest on 3/22/24. UA relatively unremarkable and urine culture with mixed urogenital luc. Work-up included negative blood cultures, urine culture. Ultimately, improved with 7 days of antibiotics (CTX--> levofloxacin).        Interval events    Since last seen by me in clinic on 24, he has continued to have phlegm, which he feels like sticks in his throat and won't come out. It does make him feel like choking. Especially feels this when he is sleeping. He does cough some things up sometime and it is yellow. He does feel like his symptoms have overall improved, but still having the phlegm issues. No fevers/chills/night sweats.     Was seen by oncology on  for routine visit. He is >5 years out from transplant with negative MRIs. Has planned to stop annual MRI.     Abx:   Doxycycline: -  Augmentin: ~-       Other Medical History:     Allergies Patient has no known allergies.    Past Medical History  Past Medical History:   Diagnosis Date    Anemia     Biliary stricture of transplanted liver (H) 2018    BPH (benign prostatic hyperplasia)     Cancer (H)     hepatocellualr carcinoma    Cirrhosis of liver (H) 2018    Diabetes (H)     Enlarged prostate     Hypothyroidism     Impotence of organic origin 2020    Inguinal hernia     Repaired with mesh on 18    Liver lesion 2018    Liver transplanted (H) 2018     donor liver transplant    Portal vein  thrombosis     on path explant    Postoperative atrial fibrillation (H) 2018    Prostate cancer (H)     TB lung, latent     Treated     PastSurgical History   has a past surgical history that includes appendectomy; colonoscopy; Transplant liver recipient  donor (N/A, 2018); Herniorrhaphy inguinal (2018); Endoscopic retrograde cholangiopancreatogram (N/A, 2018); IR Liver Biopsy Percutaneous (2018); Endoscopic retrograde cholangiopancreatogram (2019); Endoscopic retrograde cholangiopancreatogram (N/A, 2019); Laparotomy exploratory; Davinci prostatectomy (N/A, 2020); Coronary Angiogram (N/A, 10/13/2021); Instantaneous Wave-Free Ratio (N/A, 10/13/2021); Percutaneous Coronary Intervention Stent (N/A, 10/13/2021); Intravascular Ultrasound (N/A, 10/13/2021); Percutaneous Coronary Intervention - Lithotripsy (N/A, 10/13/2021); Bronchoscopy (rigid or flexible), diagnostic (N/A, 2024); and picc insertion (Right, 2024).   Family History  Family History   Problem Relation Age of Onset    Memory loss Mother     Liver Disease Brother     Skin Cancer No family hx of     Melanoma No family hx of     Social History  He reports that he quit smoking about 6 years ago. His smoking use included cigarettes and cigars. He started smoking about 53 years ago. He has a 1.4 pack-year smoking history. He has never used smokeless tobacco. He reports that he does not drink alcohol and does not use drugs.   Notable Exposures Listed below if pertinent    Vaccination History:  Immunization History   Administered Date(s) Administered    COVID-19 12+ (-) (Pfizer) 2024    COVID-19 Bivalent 12+ (Pfizer) 10/26/2022, 2023    COVID-19 Monovalent 18+ (Moderna) 2021, 2021, 2021    COVID-19 Monovalent Booster 18+ (Moderna) 2022    DTaP, Unspecified 2018    Influenza (High Dose) 3 valent vaccine 10/24/2018, 10/16/2019    Influenza (IIV3) PF  11/15/2000    Influenza Vaccine 65+ (Fluzone HD) 09/25/2020, 10/21/2021, 10/26/2022, 03/20/2024    Pneumo Conj 13-V (2010&after) 08/21/2018, 01/13/2020    Pneumococcal 23 valent 12/04/2018    RSV Vaccine (Arexvy) 04/23/2024    TD,PF 7+ (Tenivac) 11/15/2000    TDAP (Adacel,Boostrix) 08/21/2018    TDAP Vaccine (Boostrix) 08/21/2018    Zoster recombinant adjuvanted (SHINGRIX) 08/21/2018, 10/24/2018             Physical Exam:     VITAL SIGNS:  There were no vitals taken for this visit.     GENERAL APPEARANCE: Not in acute distress. Lying in bed.     PHYSICAL EXAM:   Eyes:     no ptosis, no discharge, no scleral icterus  Mouth, Throat:     mucous membranes moist  Cardiovascular: Appears well-perfused.   Respiratory:  No wheezes/crackles noted   Skin:     Dry and intact   No rashes  Neurologic:     Higher Mental Function: Conversant, AOx4   Facial asymmetry grossly absent   is ambulatory  Psychiatric:     Appropriate           Laboratory Data:   Metabolic Studies       Recent Labs   Lab Test 06/11/24  1456 05/21/24  1633 05/02/24  1348 04/16/24  1011 04/16/24  0527 04/15/24  0959 04/15/24  0439 04/14/24  1253 04/14/24  1134 04/14/24  0840 09/08/23  2201 09/08/23  1253    137 135   < > 130*  --  135  --  133*  --    < > 142   POTASSIUM 4.9 4.6 5.1   < > 4.0  --  4.2  --  4.2  --    < > 6.0*   CHLORIDE 104 105 102   < > 99  --  101  --  103  --    < > 107   CO2 26 24 23   < > 21*  --  22  --  19*  --    < > 25   ANIONGAP 7 8 10   < > 10  --  12  --  11  --    < > 10   BUN 20.1 24.2* 17.2   < > 17.3  --  13.5  --  15.9  --    < > 23.9*   CR 0.91 1.26* 0.97   < > 0.96  --  0.94  --  0.89  --    < > 0.98   GFRESTIMATED 90 61 84   < > 85  --  87  --  >90  --    < > 83   * 260* 404*   < > 285*   < > 149*   < > 304*  --    < > 217*   A1C  --   --   --   --  8.4*  --   --   --   --   --    < >  --    YELENA 9.1 8.7* 8.7*   < > 8.2*  --  8.7*  --  8.6*  --    < > 9.3   PHOS  --   --   --   --   --   --   --   --   --   --    --  3.4   MAG  --   --  1.7  --   --   --   --   --   --   --    < >  --    LACT  --   --   --   --   --   --  1.6  --   --  0.9   < >  --    PCAL  --   --   --   --   --   --   --   --  30.80*  --    < >  --    FGTL  --   --   --   --   --   --   --   --  <31  --    < >  --     < > = values in this interval not displayed.       Hepatic Studies    Recent Labs   Lab Test 06/11/24 1456 05/21/24  1633 05/02/24  1348 03/20/24  1409 03/08/24  0526 03/05/24  0554 03/04/24  0626   BILITOTAL 0.5 0.4 0.6   < > 0.3   < > 0.3   DBIL  --   --   --   --  <0.20   < > <0.20   ALKPHOS 172* 221* 209*   < > 249*   < > 308*   PROTTOTAL 6.8 6.6 6.6   < > 5.5*   < > 5.5*   ALBUMIN 3.7 3.5 3.4*   < > 2.6*   < > 2.4*   AST 18 18 15   < > 18   < > 31   ALT 16 24 18   < > 19   < > 30   LDH  --   --   --   --   --   --  179    < > = values in this interval not displayed.       Hematology Studies      Recent Labs   Lab Test 06/11/24  1456 05/21/24  1633 05/02/24  1348 04/17/24  0607 04/16/24  0527 04/15/24  0439 07/21/21  1804 07/07/21  1059 03/12/19  0815 03/04/19  1218   WBC 7.4 7.4 6.6 5.2 4.7 6.0   < > 7.0   < > 3.6*   ANEU  --   --   --   --   --   --   --  4.6  --  2.3   ALYM  --   --   --   --   --   --   --  1.8  --  1.0   KM  --   --   --   --   --   --   --  0.4  --  0.2   AEOS  --   --   --   --   --   --   --  0.2  --  0.1   HGB 13.3 12.2* 12.0* 12.4* 12.4* 13.8   < > 14.0   < > 11.2*   HCT 40.9 37.9* 37.4* 37.2* 37.3* 42.8   < > 40.1   < > 34.4*    285 287 188 166 167   < > 208   < > 199    < > = values in this interval not displayed.       Arterial Blood Gas Testing    Recent Labs   Lab Test 04/12/24  2131 12/23/18  0404 12/22/18 2126 12/22/18 2010 12/22/18  1914 12/22/18  1700   PH  --  7.41 7.43 7.41 7.42 7.36   PCO2  --  41 41 42 39 41   PO2  --  71* 92 221* 247* 184*   HCO3  --  26 27 26 25 23   O2PER 21 40 40 75 75 75        Medication levels    Recent Labs   Lab Test 04/15/24  1541 03/23/22  0720   VANCOMYCIN  "10.5  --    TACROL  --  <1.0*       Absolute CD4, [place] T Cells   Date Value Ref Range Status   06/11/2024 289 (L) 441 - 2,156 cells/uL Final       Inflammatory Markers    Recent Labs   Lab Test 09/08/23  1253   PSA <0.01       Immune Globulin Studies     Recent Labs   Lab Test 04/12/24  2117 03/24/21  0812   IGG 1,143 1,064   IGM  --  39   IGA  --  324       Pancreatitis testing    Recent Labs   Lab Test 04/13/24  0029 07/19/23  1411 07/10/19  0909 03/04/19  1218 02/18/19  0742 12/23/18  0404 12/22/18  0013   AMYLASE  --   --   --  44 38 46 67   LIPASE 19  --   --  42* 35* 93  --    TRIG  --  170*   < >  --   --   --   --     < > = values in this interval not displayed.       Gout Labs    No lab results found.    Clotting Studies    Recent Labs   Lab Test 04/13/24  0557 04/12/24 2117 02/29/24  1004 02/29/24  1004 03/11/23  0434   INR 1.21* 1.07  --  1.08 0.92   PTT  --  26   < > 43*  --     < > = values in this interval not displayed.         Markers    Recent Labs   Lab Test 03/07/22  1331 09/03/21  1757 02/01/21  0723 09/25/20  0820 07/03/20  1109 03/04/19  1218 10/03/18  0923 09/18/18  1524 08/21/18  1050 07/19/18  1132 06/18/18  0747 05/29/18  0754   FETO <1.5 <1.5 1.7 <1.5 <1.5 2.5 5.9 4.4 4.5 4.9 3.8 4.0       Autoimmune Testing  No lab results found.    Invalid input(s): \"PRO3\", \"C3\", \"C4\", \"VASPAN\"    Thyroid Studies     Recent Labs   Lab Test 05/02/24  1348 04/13/24  0029 03/02/24  0751 09/08/23  1253 06/14/23  1556   TSH 7.75* 0.32 5.47* 2.17 4.38*   T4 0.96  --  1.24  --  1.18       Urine Studies     Recent Labs   Lab Test 04/12/24  2134 03/20/24  1413 02/28/24  2035 09/08/23  2306 07/19/23  1427   URINEPH 6.0 5.5 5.5 5.5 6.0   NITRITE Negative Negative Negative Negative Negative   LEUKEST Small* Small* Small* Negative Negative   WBCU 79* 40* 49* 1 1       CSF testing   No lab results found.    Invalid input(s): \"CADAM\", \"EVPCR\", \"ENTPCR\", \"ENTEROVIRUS\"    Body fluid stats    Recent Labs   Lab Test " 03/05/24  1131 12/25/18  0719   FCOL Colorless  --    FAPR Clear  --    FWBC 125  --    FNEU 6  --    FLYM 7  --    FMONO 87  --    GS  --  >10 Squamous epithelial cells/low power field indicates oral contamination. Please   recollect.  *  <25 PMNs/low power field  Moderate  Mixed gram positive luc         Microbiology:  Fungal testing  Recent Labs   Lab Test 06/11/24  1456 04/14/24  1134 03/05/24  1131 03/04/24  1129   FGTL  --  <31  --  <31   ASPGAI 0.06  --  0.14 0.05   ASPAG  --   --  Negative  --    ASPGAA Negative  --   --  Negative   COFUNG  --   --   --  <1:2       Culture   Date Value Ref Range Status   04/14/2024 No Growth  Final   04/14/2024 No Growth  Final   04/12/2024 >100,000 CFU/mL Mixture of Urogenital Luc  Final   04/12/2024 No Growth  Final   04/12/2024 No Growth  Final   03/20/2024 10,000-50,000 CFU/mL Escherichia coli ESBL (A)  Final   03/05/2024 No Legionella species isolated  Final   03/05/2024 No Growth  Final   03/05/2024 No Growth  Final   03/05/2024 1+ Normal luc  Final   02/29/2024 3+ Normal luc  Final   02/28/2024 No Growth  Final   02/28/2024 No Growth  Final   02/28/2024 50,000-100,000 CFU/mL Escherichia coli ESBL (A)  Final   03/11/2023 >100,000 CFU/mL Escherichia coli (A)  Final     GS Culture   Date Value Ref Range Status   06/11/2024 See corresponding culture for results  Final     Culture Micro   Date Value Ref Range Status   02/18/2020   Final    <10,000 colonies/mL  urogenital luc  Susceptibility testing not routinely done     12/16/2019 <10,000 colonies/mL  urogenital luc    Final   12/16/2019 Susceptibility testing not routinely done  Final   10/10/2019 No growth  Final   12/25/2018 (A)  Final    Canceled, Test credited  >10 Squamous epithelial cells/low power field indicates oral contamination. Please   recollect.     12/25/2018   Final    Notification of test cancellation was given to  Tahira Reis RN at 1000 12.25.18 12/24/2018 No growth  Final  "  12/24/2018 No growth  Final   12/22/2018 No anaerobes isolated  Final   12/22/2018 No growth  Final   12/22/2018 Culture negative after 30 days  Final   12/22/2018 No VRE isolated  Final   (    Last check of C difficile  No results found for: \"CDBPCT\"    Infection Studies to assess Diarrhea No lab results found.    Virology:  Coronavirus-19 testing    Recent Labs   Lab Test 04/12/24  2330 02/28/24  2039 10/11/21  0806 10/01/21  1141   [id number] Negative Negative Negative Negative       Respiratory virus testing    Recent Labs   Lab Test 06/11/24  1406 04/12/24  2330 03/05/24  1131   IFLUA Not Detected Not Detected Not Detected   INFZA  --  Negative  --    FLUAH1 Not Detected Not Detected Not Detected   LX3826 Not Detected Not Detected Not Detected   FLUAH3 Not Detected Not Detected Not Detected   IFLUB Not Detected Not Detected Not Detected   INFZB  --  Negative  --    PIV1 Not Detected Not Detected Not Detected   PIV2 Not Detected Not Detected Not Detected   PIV3 Not Detected Not Detected Not Detected   PIV4 Not Detected Not Detected Not Detected   IRSV  --  Negative  --    RSVA Not Detected Not Detected Not Detected   RSVB Not Detected Not Detected Not Detected   HMPV Not Detected Not Detected Not Detected   ADENOV Not Detected Not Detected Not Detected   CORONA Not Detected Not Detected Not Detected       CMV Antibody IgG   Date Value Ref Range Status   12/22/2018 >8.0 (H) 0.0 - 0.8 AI Final     Comment:     Positive  Antibody index (AI) values reflect qualitative changes in antibody   concentration that cannot be directly associated with clinical condition or   disease state.     07/19/2018 >8.0 (H) 0.0 - 0.8 AI Final     Comment:     Positive  Antibody index (AI) values reflect qualitative changes in antibody   concentration that cannot be directly associated with clinical condition or   disease state.       CMV Antibody IgM   Date Value Ref Range Status   12/22/2018 <0.2 0.0 - 0.8 AI Final     Comment:     " "Negative  Antibody index (AI) values reflect qualitative changes in antibody   concentration that cannot be directly associated with clinical condition or   disease state.       EBV Capsid Antibody IgG   Date Value Ref Range Status   12/22/2018 >8.0 (H) 0.0 - 0.8 AI Final     Comment:     Positive, suggests recent or past exposure  Antibody index (AI) values reflect qualitative changes in antibody   concentration that cannot be directly associated with clinical condition or   disease state.     07/19/2018 7.7 (H) 0.0 - 0.8 AI Final     Comment:     Positive, suggests recent or past exposure  Antibody index (AI) values reflect qualitative changes in antibody   concentration that cannot be directly associated with clinical condition or   disease state.       Toxoplasma Antibody IgG   Date Value Ref Range Status   09/18/2018 <3.0 0.0 - 7.1 IU/mL Final     Comment:     Negative- Absence of detectable Toxoplasma gondii IgG antibodies. A negative   result does not rule out acute infection.  The magnitude of the measured result is not indicative of the amount of   antibody present. The concentrations of anti-Toxoplasma gondii IgG in a given   specimen determined with assays from different manufacturers can vary due to   differences in assay methods and reagent specificity.       EBV Capsid Antibody IgM   Date Value Ref Range Status   12/22/2018 <0.2 0.0 - 0.8 AI Final     Comment:     No detectable antibody.  Antibody index (AI) values reflect qualitative changes in antibody   concentration that cannot be directly associated with clinical condition or   disease state.         CMV viral loads    Recent Labs   Lab Test 04/14/24  1256   CMVQNT Not Detected       CMV resistance testing  No lab results found.    No results found for: \"H6RES\"    No results found for: \"EBRES\"    No results found for: \"67155\", \"BKRES\"    Parvovirus Testing  No lab results found.    Invalid input(s): \"PRVRES\"    Adenovirus Testing  No lab results " "found.    Invalid input(s): \"ADENAB\", \"ADENOVIRUS\", \"ADQT\"    Hepatitis B Testing     Recent Labs   Lab Test 03/20/24  1409 12/22/18  0013   AUSAB  --  67.96*   HBCAB  --  Reactive*   HEPBANG Nonreactive  --         Hepatitis C Antibody   Date Value Ref Range Status   12/22/2018 Nonreactive NR^Nonreactive Final     Comment:     Assay performance characteristics have not been established for newborns,   infants, and children         Imaging:  No results found for this or any previous visit (from the past 48 hour(s)).       "

## 2024-07-23 NOTE — PATIENT INSTRUCTIONS
Thank you for coming today to clinic. We talked about the followin) Please leave a sputum sample today. We will check it for different bacteria.   2) Have your appointment with Dr. Earl on  at 7am.   3) We will not get another CT scan of your chest to see what is going on with your lungs    My phone number is: [phone] if you need to call me.     I will see you in 4-6 weeks after you have had the visit with your lung doctor.

## 2024-07-24 ENCOUNTER — LAB (OUTPATIENT)
Dept: LAB | Facility: CLINIC | Age: 69
End: 2024-07-24
Attending: STUDENT IN AN ORGANIZED HEALTH CARE EDUCATION/TRAINING PROGRAM
Payer: COMMERCIAL

## 2024-07-24 ENCOUNTER — OFFICE VISIT (OUTPATIENT)
Dept: UROLOGY | Facility: CLINIC | Age: 69
End: 2024-07-24
Payer: COMMERCIAL

## 2024-07-24 VITALS
WEIGHT: 196 LBS | SYSTOLIC BLOOD PRESSURE: 142 MMHG | DIASTOLIC BLOOD PRESSURE: 83 MMHG | HEART RATE: 63 BPM | OXYGEN SATURATION: 95 % | BODY MASS INDEX: 29.03 KG/M2 | HEIGHT: 69 IN

## 2024-07-24 DIAGNOSIS — C61 PROSTATE CANCER (H): ICD-10-CM

## 2024-07-24 DIAGNOSIS — C61 PROSTATE CANCER (H): Primary | ICD-10-CM

## 2024-07-24 LAB — PSA SERPL DL<=0.01 NG/ML-MCNC: <0.01 NG/ML (ref 0–6.5)

## 2024-07-24 PROCEDURE — 36415 COLL VENOUS BLD VENIPUNCTURE: CPT | Performed by: PATHOLOGY

## 2024-07-24 PROCEDURE — 84153 ASSAY OF PSA TOTAL: CPT | Performed by: PATHOLOGY

## 2024-07-24 PROCEDURE — 99213 OFFICE O/P EST LOW 20 MIN: CPT | Performed by: STUDENT IN AN ORGANIZED HEALTH CARE EDUCATION/TRAINING PROGRAM

## 2024-07-24 RX ORDER — ATORVASTATIN CALCIUM 40 MG/1
40 TABLET, FILM COATED ORAL DAILY
Qty: 90 TABLET | Refills: 3 | OUTPATIENT
Start: 2024-07-24

## 2024-07-24 ASSESSMENT — PAIN SCALES - GENERAL: PAINLEVEL: NO PAIN (0)

## 2024-07-24 NOTE — LETTER
"7/24/2024       RE: Loy Limon  [address]  [facility] [address]     Dear Colleague,    Thank you for referring your patient, Lyo Limon, to the [facility] [place] at [facility]. Please see a copy of my visit note below.    Subjective    REASON FOR VISIT  Prostate cancer follow up.    HISTORY OF PRESENT ILLNESS  Mr. Loy Limon is a pleasant 69 year old gentleman with history of [place] 3+4 prostate cancer status post radical prostatectomy on 1/3/2020 with final pathology showing pT3a NX MX with negative margins.  Decipher testing was completed and he was in the high risk group (0.7).  PSA has been undetectable since his surgery, but his postoperative course has been complicated by microscopic hematuria status post workup last completed in July 2021.    Appears to have struggled mainly with irritative voiding symptoms since surgery and has undergone pelvic floor physical therapy.  Still will leak a drop when coughing.    Today:  No urinary complaints  Is having some pain over \"the skin above my liver\"  States he spoke with his primary care and they believe it is a fallout of his transplant and needs more time to heal/calm down     Objective    PHYSICAL EXAM  General: Alert, oriented, no acute distress    LABORATORY  Lab Results   Component Value Date    PSA <0.01 09/08/2023    PSA <0.01 10/21/2022    PSA <0.01 09/09/2021    PSA <0.01 07/21/2021    PSA <0.01 04/23/2021    PSA <0.01 03/24/2021    PSA <0.01 02/01/2021    PSA <0.01 09/25/2020      Radical prostatectomy pathology from 1/3/2020:    FINAL DIAGNOSIS:   A. Bladder biopsy, lesion:   - Benign stromal tissue     B. Prostate, prostatectomy:   - Prostatic adenocarcinoma, acinar type   - [place] score 7 (3+4)   - Extraprostatic extension present   - Margins free of carcinoma   - See synoptic report     IMPRESSION  Rebecca 3+4 prostate cancer status post radical " prostatectomy on 1/3/2020, final pathology showing pT3 NX MX with extraprostatic extension but negative margins  Post-prostatectomy stress incontinence  Microscopic hematuria    It was a pleasure to see Mr. Loy Limon in clinic today in follow-up for his history of [place] 3+4 prostate cancer status-post radical prostatectomy in January of 2020, final pathology showing [id number]. Unfortunately, there is no up to date PSA as of now, most recent is from September of 2023. With this in mind, I recommend a PSA be done today at the lab, with another to be done in 6 months (subject to change if PSA is anything other than undetectable).     Mr. Limon expressed understanding and agreement with the above discussion and plan and all of his questions were answered to his satisfaction.     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     Intractable pain due to malignant obstruction of choledochojejunostomy loop s/p biliary drain exchange .   Metastatic ampullary cancer s/p whipple 2020  - s/p Zosyn in ED  - s/p IR drainage on 2/16   - s/p exchange/upsizing of biliary drain on 2/16/22  - Pain control as above  -  Cont Zofran, and pancreatic enzymes  - OOB and ambulate with assistance    Sepsis , POA with bacteroides Fragilis due to probable acute cholangitis as well as   Infiltrative  6.3cm  intraabdominal mass in surgical bed possible underlying necrosis  - Pt remains afebrile, Lactate benign  - + Blood culture--> Bacteroides fragilis; cont Zosyn  - urine cx NGTD   - ID following    Superior mesenteric vein thrombosis   - Dr. Castro following-  full dose AC   - s/p Lovenox 40 x 1 dose 2/17/22   - 2/18 - started  on heparin drip, H/H stable  - plan for DOAC once nephrostomy placed   - 2/21- stop heparin drip in 6 am on 2/22/22 d/w IR attending will need to be off 4 hours prior procedure     YAMILETH with baseline SCr 0.7, multifactorial; Medication induced (ketorolac & Losartan) associated with   moderate Right hydronephrosis due to malignant obstruction   - s/p IV Hydration, Creatinine Trend: 1.1 < --1.24<--, 1.42<--, 1.40<--, 1.45<--, 1.32<--  - Hold NSAIDs and Losartan  - Check post void residual - pending ,  Strict I&O  - Urology,  Dr. Kennedy following; for right nephrostomy tube today with IR   planed for Tuesday  - medically stable for needed urgent procedure  - stop ASA , restart after 48 hours after nephrostomy placement   - 2/21 - NPO from midnight, hold heparin drip at 6 am, plan for IR nephrostomy stephani    Iron deficiency anemia    - trend cbc ,  Iron studies as above noted   - c/w  iron daily     Hyperglycemia  A1c 5.2,  trend     CAD - hold aspirin , c/w  statin    Hypoalbuminemia 2/2 Protein calorie malnutrition  - nutrition consult,  advance diet as tolerated     HTN  - Losartan stopped 2/2 renal function  - continue BB  - Hydralazine PRN for SBP > 170    HLD  -continue statin    DVT PPX SCDs     DISPO: NPO for right nephrostomy tube placement, start AC 48 hrs after procedure,  cont IV abx,     Advanced directives  - palliative care team on the case  - Full code  - wife Carmen [phone] - updated 2/18, 2/19, 2/20, 2/21       69M with PMHx of 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     Intractable pain due to malignant obstruction of choledochojejunostomy loop s/p biliary drain exchange .   Metastatic ampullary cancer s/p whipple 2020  - s/p IR drainage on 2/16   - s/p exchange/upsizing of biliary drain on 2/16/22  - pain management - tylenol 650 tid, gabapentin,  with dilaudid 1 mg q4h prn and 2 mg q4h prn, with laxatives   -  Cont Zofran, and pancreatic enzymes  - palliative team consult  - oncology consult     Sepsis , POA with bacteroides Fragilis due to probable acute cholangitis as well as   Infiltrative  6.3cm  intraabdominal mass in surgical bed possible underlying necrosis  - Pt remains afebrile, Lactate benign  - + Blood culture--> Bacteroides fragilis; cont Zosyn  - urine cx NGTD   - ID following    Superior mesenteric vein thrombosis   - Dr. Castro following-  full dose AC   - s/p Lovenox 40 x 1 dose 2/17/22   - 2/18 - started  on heparin drip, H/H stable  - plan for DOAC once nephrostomy placed   - 2/21- stop heparin drip in 6 am on 2/22/22 d/w IR attending will need to be off 4 hours prior procedure     YAMILETH with baseline SCr 0.7, multifactorial; Medication induced (ketorolac & Losartan) associated with   moderate Right hydronephrosis due to malignant obstruction   - s/p IV Hydration, Creatinine Trend: 1.1 < --1.24<--, 1.42<--, 1.40<--, 1.45<--, 1.32<--  - Hold NSAIDs and Losartan  - Check post void residual - pending ,  Strict I&O  - Urology,  Dr. Kennedy following; for right nephrostomy tube today with IR   planed for Tuesday  - medically stable for needed urgent procedure  - stop ASA , restart after 48 hours after nephrostomy placement   - 2/21 - NPO from midnight, hold heparin drip at 6 am, plan for IR nephrostomy stephani    Iron deficiency anemia    - trend cbc ,  Iron studies as above noted   - c/w  iron daily     Hyperglycemia  A1c 5.2,  trend     CAD - hold aspirin , c/w  statin    Hypoalbuminemia 2/2 Protein calorie malnutrition  - nutrition consult,  advance diet as tolerated     HTN  - Losartan stopped 2/2 renal function  - continue BB  - Hydralazine PRN for SBP > 170    HLD  -continue statin    DVT PPX SCDs     DISPO: NPO for right nephrostomy tube placement, start AC 48 hrs after procedure,  cont IV abx,     Advanced directives  - palliative care team on the case  - Full code  - wife Carmen [phone] - updated 2/18, 2/19, 2/20, 2/21

## 2024-07-24 NOTE — NURSING NOTE
"Chief Complaint   Patient presents with    Follow Up     Prostate cancer    Has pain on skin \"over liver\" hurts after finishing eating. Had stomach reduction surgery which he attributes pain to.        Blood pressure (!) 142/83, pulse 63, height 1.753 m (5' 9\"), weight 88.9 kg (196 lb), SpO2 95%. Body mass index is 28.94 kg/m .    Patient Active Problem List   Diagnosis    Cirrhosis of liver (H)    Liver lesion    Liver transplant recipient (H)    Immunosuppressed status (H24)    Hypervolemia    Atrial fibrillation with rapid ventricular response (H)    Hematuria    Bilateral wheezing    Hypoxia    Hyperglycemia    Pre-diabetes    Essential hypertension    Constipation    Biliary stricture of transplanted liver (H)    Drug-induced diabetes mellitus (H24)    Muscle tension dysphonia    Laennec's cirrhosis (H)    Elevated ferritin    Inguinal hernia of right side with obstruction and without gangrene    Right sided abdominal pain    Tobacco use disorder    Prostate cancer (H)    Diabetes mellitus, type 2 (H)    Persistent proteinuria    Chronic kidney disease, stage 2 (mild)    Hypothyroidism, unspecified type    Status post coronary angiogram    Polyneuropathy associated with underlying disease (H24)    Uncontrolled type 2 diabetes mellitus with hyperglycemia (H)    Coronary artery disease of native artery of native heart with stable angina pectoris (H24)    Acute UTI    DEREK (acute kidney injury) (H24)    Fever in adult    Community acquired pneumonia of left lower lobe of lung    Memory loss    Confusion    Atrial fibrillation with rapid ventricular response (H)    Sepsis, due to unspecified organism, unspecified whether acute organ dysfunction present (H)       No Known Allergies    Current Outpatient Medications   Medication Sig Dispense Refill    Alcohol Swabs PADS Use to swab the area of the injection or fam as directed Per insurance coverage 200 each 1    amoxicillin-clavulanate (AUGMENTIN) 875-125 MG tablet " Take 1 tablet by mouth 2 times daily 20 tablet 0    apixaban ANTICOAGULANT (ELIQUIS) 5 MG tablet Take 1 tablet (5 mg) by mouth 2 times daily 60 tablet 1    atorvastatin (LIPITOR) 40 MG tablet Take 1 tablet (40 mg) by mouth daily 90 tablet 3    blood glucose (NO BRAND SPECIFIED) lancets standard Use to test blood sugar 3 times daily or as directed.Please dispense insurance covered brand 300 Lancet 3    blood glucose (NO BRAND SPECIFIED) lancets standard To use to test glucose level in the blood Use to test blood sugar  3  times daily as directed. To accompany glucose monitor brands per insurance coverage. 200 each 1    blood glucose (NO BRAND SPECIFIED) test strip Use to test blood sugar 3 times daily or as directed. Please dispense insurance covered brand 300 strip 3    blood glucose (NO BRAND SPECIFIED) test strip To use to test glucose level in the blood Use to test blood sugar  3 times daily as directed. To accompany glucose monitor brands per insurance coverage. 100 strip 3    blood glucose (ONE TOUCH SURESOFT) lancing device Lancing device to be used with lancets. 1 each 0    blood glucose monitoring (NO BRAND SPECIFIED) meter device kit Use to test blood sugar 3 times daily or as directed. Please dispense insurance covered brand 1 kit 0    blood glucose monitoring (NO BRAND SPECIFIED) meter device kit Use as directed Per insurance coverage 1 kit 0    blood glucose monitoring (ONE TOUCH ULTRA 2) meter device kit Use to test blood sugar 2 times daily or as directed. 1 kit 0    blood glucose monitoring (ONE TOUCH ULTRASOFT) lancets Use to test blood sugar 2 times daily. 100 each 6    diltiazem ER COATED BEADS (CARDIZEM CD/CARTIA XT) 240 MG 24 hr capsule Take 1 capsule (240 mg) by mouth daily 90 capsule 1    doxycycline hyclate (VIBRAMYCIN) 100 MG capsule Take 1 capsule (100 mg) by mouth 2 times daily 20 capsule 0    hydrALAZINE (APRESOLINE) 25 MG tablet Take 1 tablet (25 mg) by mouth 2 times daily 180 tablet 3     insulin glargine (LANTUS PEN) 100 UNIT/ML pen Inject 28 units subcutaneous at hs. 20 mL 3    insulin glargine (LANTUS SOLOSTAR) 100 UNIT/ML pen Inject 20 Units Subcutaneous at bedtime 15 mL 1    insulin pen needle (31G X 5 MM) 31G X 5 MM miscellaneous Use one  pen needles daily or as directed. 100 each 3    levofloxacin (LEVAQUIN) 500 MG tablet Take 1 tablet (500 mg) by mouth daily 3 tablet 0    levothyroxine (SYNTHROID/LEVOTHROID) 150 MCG tablet Take 1 tablet (150 mcg) by mouth daily 90 tablet 3    metFORMIN (GLUCOPHAGE XR) 500 MG 24 hr tablet Take 1,000 mg by mouth 2 times daily (with meals)      methocarbamol (ROBAXIN) 500 MG tablet Take 500 mg by mouth 4 times daily as needed for muscle spasms      mycophenolate (GENERIC EQUIVALENT) 500 MG tablet Take 500 mg by mouth 2 times daily      predniSONE (DELTASONE) 5 MG tablet Take 1 tablet (5 mg) by mouth daily 90 tablet 1    sulfamethoxazole-trimethoprim (BACTRIM DS) 800-160 MG tablet Take 1 tablet by mouth 2 times daily 14 tablet 0    ursodiol (ACTIGALL) 250 MG tablet Take 1 tablet (250 mg) by mouth 2 times daily 180 tablet 3    Vitamin D3 (CHOLECALCIFEROL) 25 mcg (1000 units) tablet Take 2 tablets (50 mcg) by mouth daily 180 tablet 3    mycophenolate (GENERIC EQUIVALENT) 250 MG capsule Take 2 capsules (500 mg) by mouth 2 times daily for 90 days 90 capsule 3       Social History     Tobacco Use    Smoking status: Former     Current packs/day: 0.00     Average packs/day: (1.4 ttl pk-yrs)     Types: Cigarettes, Cigars     Start date: 1970     Quit date: 3/14/2018     Years since quittin.3    Smokeless tobacco: Never    Tobacco comments:     Quit smoking 2018, one cigarette after dinner   Vaping Use    Vaping status: Never Used   Substance Use Topics    Alcohol use: No     Comment: Quit drinking 2018    Drug use: Lavern Campbell  2024  1:53 PM

## 2024-07-29 ENCOUNTER — TELEPHONE (OUTPATIENT)
Dept: INFECTIOUS DISEASES | Facility: CLINIC | Age: 69
End: 2024-07-29
Payer: COMMERCIAL

## 2024-07-29 NOTE — TELEPHONE ENCOUNTER
EP called 7/29 to sched a 1 month follow up with Dr. Capone via in-person per checkout notes from 7/23.

## 2024-08-05 ENCOUNTER — TELEPHONE (OUTPATIENT)
Dept: PULMONOLOGY | Facility: CLINIC | Age: 69
End: 2024-08-05
Payer: COMMERCIAL

## 2024-08-05 NOTE — TELEPHONE ENCOUNTER
"Per Veena: \"Hey this patient no showed his PFT he was supposed to get before his appt. Can we see if he's planning on coming tomorrow and if he can be squeezed in for PFTs?\"  "

## 2024-08-06 ENCOUNTER — OFFICE VISIT (OUTPATIENT)
Dept: PULMONOLOGY | Facility: CLINIC | Age: 69
End: 2024-08-06
Payer: COMMERCIAL

## 2024-08-06 ENCOUNTER — PRE VISIT (OUTPATIENT)
Dept: PULMONOLOGY | Facility: CLINIC | Age: 69
End: 2024-08-06
Payer: COMMERCIAL

## 2024-08-06 ENCOUNTER — OFFICE VISIT (OUTPATIENT)
Dept: PULMONOLOGY | Facility: CLINIC | Age: 69
End: 2024-08-06
Attending: FAMILY MEDICINE
Payer: COMMERCIAL

## 2024-08-06 VITALS — HEART RATE: 68 BPM | SYSTOLIC BLOOD PRESSURE: 164 MMHG | DIASTOLIC BLOOD PRESSURE: 94 MMHG | OXYGEN SATURATION: 97 %

## 2024-08-06 DIAGNOSIS — J18.9 PNEUMONIA: Primary | ICD-10-CM

## 2024-08-06 DIAGNOSIS — R05.9 COUGH, UNSPECIFIED TYPE: ICD-10-CM

## 2024-08-06 DIAGNOSIS — J18.9 PNEUMONIA OF LEFT UPPER LOBE DUE TO INFECTIOUS ORGANISM: Primary | ICD-10-CM

## 2024-08-06 LAB
ACID FAST STAIN (ARUP): NORMAL

## 2024-08-06 PROCEDURE — 99204 OFFICE O/P NEW MOD 45 MIN: CPT | Mod: 25 | Performed by: INTERNAL MEDICINE

## 2024-08-06 PROCEDURE — 87070 CULTURE OTHR SPECIMN AEROBIC: CPT | Performed by: INTERNAL MEDICINE

## 2024-08-06 PROCEDURE — 94729 DIFFUSING CAPACITY: CPT | Performed by: INTERNAL MEDICINE

## 2024-08-06 PROCEDURE — 94375 RESPIRATORY FLOW VOLUME LOOP: CPT | Performed by: INTERNAL MEDICINE

## 2024-08-06 PROCEDURE — 99000 SPECIMEN HANDLING OFFICE-LAB: CPT | Performed by: PATHOLOGY

## 2024-08-06 PROCEDURE — G0463 HOSPITAL OUTPT CLINIC VISIT: HCPCS | Performed by: INTERNAL MEDICINE

## 2024-08-06 PROCEDURE — 94726 PLETHYSMOGRAPHY LUNG VOLUMES: CPT | Performed by: INTERNAL MEDICINE

## 2024-08-06 NOTE — PROGRESS NOTES
"[facility] [address] [address]  Phone: [phone]  Fax: [phone]          Pulmonary Clinic New Patient Evaluation        Name: Loy Limon MRN: [id number]     Age: 69 year old   YOB: 1955             HPI:   CC: Consolidation on CT    Loy Limon is a 69 year old male with H/O anemia, BPH, hepatocellular carcinoma status post liver transplant in 2018, latent TB status posttreatment who presents to establish care for pulmonary consolidation.    Today's visit was completed with the assistance of a professional  for the [place] language by speaker phone.    He was previously seen by Dr. Zazueta in 2018 for pretransplant pulmonary evaluation.    He reports being hospitalized for pneumonia in May and was treated with antibiotics. Of note that hospitalization mentions hemoptysis but the patient denies every having.  He was also seen by his primary care provider in June for productive cough.  At that time he was treated with Augmentin with improvement but not resolution of his cough.    In April he was treated with a course of Levaquin, in May he was hospitalized and treated with a course of doxycycline, and in June was treated with Augmentin.    Reports that his \"Lungs don't feel good.\" Day before yesterday had pain in r lung. Feels that he's been getting steadily worse since May.    He has a productive cough and phlegm when sleeping such that he has to get up and cough it out. Sputum cultures have been collected in the past but with too much oral contamination.     He denies fevers or rigors, but has been having night sweats. No weight loss, fatigue, or malaise.No change in appetite.     He is originally from [place] and has been living in the  for about 33 years.    Currently on pred 5mg per day    Sometimes chest gets tight    Faint wheeze, otherwise clear, rrr        Was seen " by ID on 7/23/2024  At this point, he still has significant sputum production that wakes him from sleep at night. At our last visit on 6/11/24, he had extensive infectious diseases testing, including negative Aspergillus galactomannan, serum CrAG, 1,3 BD glucan, serum/urine histo, and RVP testing. He has 1 sputum AFB culture that is also negative. Unfortunately, he missed his pulmonology visit on 7/2, but has been rescheduled for 8/6. In the meantime, I will send for repeat sputum culture (AFB, bacterial). I will also have him repeat a CT chest, since it has been ~2 months since his last one. Of note, he does have a history of LTBI, but completed treatment in 2019. Thus, he is low risk for recurrent tuberculosis.     + Quant 7/19/2018. Was initially started on [id number]/kg PO x4 months on 8/21/2018 (end date ~12/20/2024) but only completed 2.5 months worth. On ID clinic visit with Dr Clancy on 2/5/2019, was transitioned over to INH 300mg daily (due to rifampin DDI with transplant immunosuppressants) which he took for an additional ~2 months with end date on ~4/9/2019.   ESBL E coli Pyelonephritis: Admitted from 2/28-3/9/24 with ESBL E coli pyelonephritis. Treated with 14 days of ertapenem (3/1/2024-3/14/2024).    CAP: Occurred during 2/28-3/9/24 admission. BAL negative for fungal etiology. Given azithro x3 days and ertapenem (see above). AFB x3 negative.   Admission for fevers, unexplained sepsis (4/12-4/17/24): Admitted with hypotension, fevers, leukocytosis. CT C/A/P with some lower lobe peribronchovascular GGOs, but these were actually improved from CT chest on 3/22/24. UA relatively unremarkable and urine culture with mixed urogenital luc. Work-up included negative blood cultures, urine culture. Ultimately, improved with 7 days of antibiotics (CTX--> levofloxacin).           Exposure History:   Tobacco: No, did before transplant. Started about 20, about 0.5 packs per week.  Other inhaled substances  (Vaping, hookah. 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  GERD: Never         Past Medical History:     Past Medical History:   Diagnosis Date    Anemia     Biliary stricture of transplanted liver (H) 2018    BPH (benign prostatic hyperplasia)     Cancer (H)     hepatocellualr carcinoma    Cirrhosis of liver (H) 2018    Diabetes (H)     Enlarged prostate     Hypothyroidism     Impotence of organic origin 2020    Inguinal hernia     Repaired with mesh on 18    Liver lesion 2018    Liver transplanted (H) 2018     donor liver transplant    Portal vein thrombosis     on path explant    Postoperative atrial fibrillation (H) 2018    Prostate cancer (H)     TB lung, latent     Treated              Past Surgical History:      Past Surgical History:   Procedure Laterality Date    APPENDECTOMY      BRONCHOSCOPY (RIGID OR FLEXIBLE), DIAGNOSTIC N/A 2024    Procedure: BRONCHOSCOPY, WITH BRONCHOALVEOLAR LAVAGE;  Surgeon: Jimmy Farley MD;  Location:  GI    COLONOSCOPY          CV CORONARY ANGIOGRAM N/A 10/13/2021    Procedure: CV CORONARY ANGIOGRAM;  Surgeon: Yaya Hampton MD;  Location: [facility] CARDIAC CATH LAB    CV CORONARY LITHOTRIPSY PCI N/A 10/13/2021    Procedure: CV Coronary Lithotripsy PCI;  Surgeon: Yaya Hampton MD;  Location:  HEART CARDIAC CATH LAB    CV INSTANTANEOUS WAVE-FREE RATIO N/A 10/13/2021    Procedure: Instantaneous Wave-Free Ratio;  Surgeon: Yaya Hampton MD;  Location:  HEART CARDIAC CATH LAB    CV INTRAVASULAR ULTRASOUND N/A 10/13/2021    Procedure: Intravascular Ultrasound;  Surgeon: Yaya Hampton MD;  Location: [facility] CARDIAC CATH LAB    CV PCI STENT DRUG ELUTING N/A 10/13/2021    Procedure: Percutaneous Coronary Intervention Stent Drug Eluting;  Surgeon: Yaya Hampton MD;  Location:  HEART CARDIAC CATH LAB     "DAVINCI PROSTATECTOMY N/A 2020    Procedure: Robot-assisted laparoscopic radical prostatectomy, Bladder biopsy;  Surgeon: Kenrick Casiano MD;  Location: [facility]    ENDOSCOPIC RETROGRADE CHOLANGIOPANCREATOGRAM N/A 2018    Procedure: ENDOSCOPIC RETROGRADE CHOLANGIOPANCREATOGRAM, with Billary Sphincterotomy and Billary Stent Placement;  Surgeon: Omero Lawler MD;  Location: [facility] OR    ENDOSCOPIC RETROGRADE CHOLANGIOPANCREATOGRAM  2019    Procedure: COMBINED ENDOSCOPIC RETROGRADE CHOLANGIOPANCREATOGRAPHY, Bile Duct Stent Exchange;  Surgeon: Omero Lawler MD;  Location: [facility] OR    ENDOSCOPIC RETROGRADE CHOLANGIOPANCREATOGRAM N/A 2019    Procedure: ENDOSCOPIC RETROGRADE CHOLANGIOPANCREATOGRAPHY, WITH BILIARY STENT REMOVAL AND STONE EXTRACTION;  Surgeon: Omero Lawler MD;  Location: [facility] OR    HERNIORRHAPHY INGUINAL  2018    Procedure: Herniorrhaphy inguinal;  Surgeon: Emil Echevarria MD;  Location: [facility] OR    IR LIVER BIOPSY PERCUTANEOUS  2018    LAPAROTOMY EXPLORATORY      per the patient \"for infection in the LLQ\"     PICC INSERTION Right 2024    47 cm basilic    TRANSPLANT LIVER RECIPIENT  DONOR N/A 2018    Procedure: TRANSPLANT LIVER RECIPIENT  DONOR;  Surgeon: Emil Echevarria MD;  Location: [facility] OR             Social History:     Social History     Socioeconomic History    Marital status:      Spouse name: Not on file    Number of children: Not on file    Years of education: Not on file    Highest education level: Not on file   Occupational History    Not on file   Tobacco Use    Smoking status: Former     Current packs/day: 0.00     Average packs/day: (1.4 ttl pk-yrs)     Types: Cigarettes, Cigars     Start date: 1970     Quit date: 3/14/2018     Years since quittin.4    Smokeless tobacco: Never    Tobacco comments:     Quit smoking 2018, one cigarette after dinner   Vaping Use    Vaping status: " Never Used   Substance and Sexual Activity    Alcohol use: No     Comment: Quit drinking Feb 2018    Drug use: No    Sexual activity: Not on file   Other Topics Concern    Parent/sibling w/ CABG, MI or angioplasty before 65F 55M? Not Asked   Social History Narrative    Born in [place], came to [place] years ago.     Has worked in [employer], trimming meats     Social Determinants of Health     Financial Resource Strain: Not on file   Food Insecurity: Not on file   Transportation Needs: Not on file   Physical Activity: Not on file   Stress: Not on file   Social Connections: Not on file   Interpersonal Safety: Low Risk  (3/20/2024)    Interpersonal Safety     Do you feel physically and emotionally safe where you currently live?: Yes     Within the past 12 months, have you been hit, slapped, kicked or otherwise physically hurt by someone?: No     Within the past 12 months, have you been humiliated or emotionally abused in other ways by your partner or ex-partner?: No   Housing Stability: Not on file            Family History:     Family History   Problem Relation Age of Onset    Memory loss Mother     Liver Disease Brother     Skin Cancer No family hx of     Melanoma No family hx of              Immunizations:     Immunization History   Administered Date(s) Administered    COVID-19 12+ (2023-24) (Pfizer) 03/20/2024    COVID-19 Bivalent 12+ (Pfizer) 10/26/2022, 05/09/2023    COVID-19 Monovalent 18+ (Moderna) 03/01/2021, 03/30/2021, 09/21/2021    COVID-19 Monovalent Booster 18+ (Moderna) 04/26/2022    DTaP, Unspecified 08/21/2018    Influenza (High Dose) 3 valent vaccine 10/24/2018, 10/16/2019    Influenza (IIV3) PF 11/15/2000    Influenza Vaccine 65+ (Fluzone HD) 09/25/2020, 10/21/2021, 10/26/2022, 03/20/2024    Pneumo Conj 13-V (2010&after) 08/21/2018, 01/13/2020    Pneumococcal 23 valent 12/04/2018    RSV Vaccine (Arexvy) 04/23/2024    TD,PF 7+ (Tenivac) 11/15/2000    TDAP (Adacel,Boostrix) 08/21/2018     TDAP Vaccine (Boostrix) 08/21/2018    Zoster recombinant adjuvanted (SHINGRIX) 08/21/2018, 10/24/2018             Allergies:   No Known Allergies          Medications:     Current Outpatient Medications   Medication Sig Dispense Refill    Alcohol Swabs PADS Use to swab the area of the injection or fam as directed Per insurance coverage 200 each 1    amoxicillin-clavulanate (AUGMENTIN) 875-125 MG tablet Take 1 tablet by mouth 2 times daily 20 tablet 0    apixaban ANTICOAGULANT (ELIQUIS) 5 MG tablet Take 1 tablet (5 mg) by mouth 2 times daily 60 tablet 1    atorvastatin (LIPITOR) 40 MG tablet Take 1 tablet (40 mg) by mouth daily 90 tablet 3    blood glucose (NO BRAND SPECIFIED) lancets standard Use to test blood sugar 3 times daily or as directed.Please dispense insurance covered brand 300 Lancet 3    blood glucose (NO BRAND SPECIFIED) lancets standard To use to test glucose level in the blood Use to test blood sugar  3  times daily as directed. To accompany glucose monitor brands per insurance coverage. 200 each 1    blood glucose (NO BRAND SPECIFIED) test strip Use to test blood sugar 3 times daily or as directed. Please dispense insurance covered brand 300 strip 3    blood glucose (NO BRAND SPECIFIED) test strip To use to test glucose level in the blood Use to test blood sugar  3 times daily as directed. To accompany glucose monitor brands per insurance coverage. 100 strip 3    blood glucose (ONE TOUCH SURESOFT) lancing device Lancing device to be used with lancets. 1 each 0    blood glucose monitoring (NO BRAND SPECIFIED) meter device kit Use to test blood sugar 3 times daily or as directed. Please dispense insurance covered brand 1 kit 0    blood glucose monitoring (NO BRAND SPECIFIED) meter device kit Use as directed Per insurance coverage 1 kit 0    blood glucose monitoring (ONE TOUCH ULTRA 2) meter device kit Use to test blood sugar 2 times daily or as directed. 1 kit 0    blood glucose monitoring (ONE TOUCH  ULTRASOFT) lancets Use to test blood sugar 2 times daily. 100 each 6    diltiazem ER COATED BEADS (CARDIZEM CD/CARTIA XT) 240 MG 24 hr capsule Take 1 capsule (240 mg) by mouth daily 90 capsule 1    doxycycline hyclate (VIBRAMYCIN) 100 MG capsule Take 1 capsule (100 mg) by mouth 2 times daily 20 capsule 0    hydrALAZINE (APRESOLINE) 25 MG tablet Take 1 tablet (25 mg) by mouth 2 times daily 180 tablet 3    insulin glargine (LANTUS PEN) 100 UNIT/ML pen Inject 28 units subcutaneous at hs. 20 mL 3    insulin glargine (LANTUS SOLOSTAR) 100 UNIT/ML pen Inject 20 Units Subcutaneous at bedtime 15 mL 1    insulin pen needle (31G X 5 MM) 31G X 5 MM miscellaneous Use one  pen needles daily or as directed. 100 each 3    levofloxacin (LEVAQUIN) 500 MG tablet Take 1 tablet (500 mg) by mouth daily 3 tablet 0    levothyroxine (SYNTHROID/LEVOTHROID) 150 MCG tablet Take 1 tablet (150 mcg) by mouth daily 90 tablet 3    metFORMIN (GLUCOPHAGE XR) 500 MG 24 hr tablet Take 1,000 mg by mouth 2 times daily (with meals)      methocarbamol (ROBAXIN) 500 MG tablet Take 500 mg by mouth 4 times daily as needed for muscle spasms      mycophenolate (GENERIC EQUIVALENT) 250 MG capsule Take 2 capsules (500 mg) by mouth 2 times daily for 90 days 90 capsule 3    mycophenolate (GENERIC EQUIVALENT) 500 MG tablet Take 500 mg by mouth 2 times daily      predniSONE (DELTASONE) 5 MG tablet Take 1 tablet (5 mg) by mouth daily 90 tablet 1    sulfamethoxazole-trimethoprim (BACTRIM DS) 800-160 MG tablet Take 1 tablet by mouth 2 times daily 14 tablet 0    ursodiol (ACTIGALL) 250 MG tablet Take 1 tablet (250 mg) by mouth 2 times daily 180 tablet 3    Vitamin D3 (CHOLECALCIFEROL) 25 mcg (1000 units) tablet Take 2 tablets (50 mcg) by mouth daily 180 tablet 3     No current facility-administered medications for this visit.            Review of Systems:     Review of Systems   Constitutional:  Negative for chills, fever, malaise/fatigue and weight loss.   Respiratory:   Positive for cough, sputum production, shortness of breath and wheezing. Negative for hemoptysis.             Exam:   BP (!) 164/94   Pulse 68   SpO2 97%     Physical Exam  Vitals and nursing note reviewed.   Constitutional:       Appearance: Normal appearance.   Cardiovascular:      Rate and Rhythm: Normal rate and regular rhythm.      Heart sounds: No murmur heard.  Pulmonary:      Effort: Pulmonary effort is normal.      Comments: Faint wheeze throughout, otherwise clear  Neurological:      Mental Status: He is alert.       Fingernails without clubbing         Data:     PFT: 8/6/24      Mild restriction      CT Chest 5/31/24  1. Left upper lobe predominant peribronchial vascular groundglass and  consolidative opacities likely representative of an ongoing  infectious/inflammatory process/organizing pneumonia.  2. Improved appearance of left lower lobe predominant groundglass  opacities which are nearly resolved.  3. Redemonstration of mild pulmonary artery enlargement. Correlate  clinically for pulmonary hypertension.      3/5/24 bronch  125 nucleated cells, 87% monos/macros    Sputum 6/11 and 7/23 with >10 squam but AFB neg        All studies listed here were independently reviewed and interpreted by me today.          Assessment and Plan:     ## Pulmonary opacities  ## Chronic productive cough  ## History of liver transplant  ## History of latent TB SP treatment  ## History of tobacco use    22 packyr smoking history, quit in 2018 at the time of his liver transplant. History of latent TB s/p treatment. Having resp issues since April 2024. Multiple rounds of antibiotics including Levaquin, doxy, azithro, Augmentin, and ertapenem. Multiple sputum cultures including induced sputum all with oral contamination but with negative AFB. Bronch March 2024 without abnormality. Imaging  with diffuse GGO. Inflammatory markers have been elevated in the past.    PFT's from 2018 WNL, PFT's from August with possible  restriction but patient had difficulty with the maneuvers so unclear if this is reliable      In April he was treated with a course of Levaquin, in May he was hospitalized and treated with a course of doxycycline, and in June was treated with Augmentin.        - CBC, CRP, ESR  - Induced sputum        ADDENDUM 8/19  IgE and ABPA panel neg so aspergillus less likely. HIV negative. DEYSI, ANCA, CCP, RF negative so autoimmune less likely. CRP and ESR now WNL. CBC without leukocytosis, EOS WNL.    Will repeat CT to determine if opacities are still present. If so will likely need bronchoscopy. If CT is clean will consider repeat PFT's to determine if possible asthma or other abnormality.      Return to clinic: October      Chaitanya Earl M.D.  Pulmonary & Critical Care  Pager: Click Here to page      The above note was dictated using voice recognition software and may include typographical errors. Please contact the author for any clarifications.

## 2024-08-06 NOTE — NURSING NOTE
Chief Complaint   Patient presents with    General Visit     New pt     Vitals were taken and medications were reconciled.     NIRMAL Vaughn

## 2024-08-06 NOTE — LETTER
"8/6/2024      Loy Limon  [address]  [facility] [address]      Dear Colleague,    Thank you for referring your patient, Loy Limon, to the [facility]. Please see a copy of my visit note below.      [facility]  [address] [address]  Phone: [phone]  Fax: [phone]          Pulmonary Clinic New Patient Evaluation        Name: Loy Limon MRN: [id number]     Age: 69 year old   YOB: 1955             HPI:   CC: Consolidation on CT    Loy Limon is a 69 year old male with H/O anemia, BPH, hepatocellular carcinoma status post liver transplant in 2018, latent TB status posttreatment who presents to establish care for pulmonary consolidation.    Today's visit was completed with the assistance of a professional  for the K[place] language by speaker phone.    He was previously seen by Dr. Zazueta in 2018 for pretransplant pulmonary evaluation.    He reports being hospitalized for pneumonia in May and was treated with antibiotics. Of note that hospitalization mentions hemoptysis but the patient denies every having.  He was also seen by his primary care provider in June for productive cough.  At that time he was treated with Augmentin with improvement but not resolution of his cough.    In April he was treated with a course of Levaquin, in May he was hospitalized and treated with a course of doxycycline, and in June was treated with Augmentin.    Reports that his \"Lungs don't feel good.\" Day before yesterday had pain in r lung. Feels that he's been getting steadily worse since May.    He has a productive cough and phlegm when sleeping such that he has to get up and cough it out. Sputum cultures have been collected in the past but with too much oral contamination.     He denies fevers or rigors, but has been having " night sweats. No weight loss, fatigue, or malaise.No change in appetite.     He is originally from [place] and has been living in the [place] for about [place] years.    Currently on pred 5mg per day    Sometimes chest gets tight    Faint wheeze, otherwise clear, rrr        Was seen by ID on 7/23/2024  At this point, he still has significant sputum production that wakes him from sleep at night. At our last visit on 6/11/24, he had extensive infectious diseases testing, including negative Aspergillus galactomannan, serum CrAG, 1,3 BD glucan, serum/urine histo, and RVP testing. He has 1 sputum AFB culture that is also negative. Unfortunately, he missed his pulmonology visit on 7/2, but has been rescheduled for 8/6. In the meantime, I will send for repeat sputum culture (AFB, bacterial). I will also have him repeat a CT chest, since it has been ~2 months since his last one. Of note, he does have a history of LTBI, but completed treatment in 2019. Thus, he is low risk for recurrent tuberculosis.     + Quant 7/19/2018. Was initially started on [id number]/kg PO x4 months on 8/21/2018 (end date ~12/20/2024) but only completed 2.5 months worth. On ID clinic visit with Dr Clancy on 2/5/2019, was transitioned over to INH 300mg daily (due to rifampin DDI with transplant immunosuppressants) which he took for an additional ~2 months with end date on ~4/9/2019.   ESBL E coli Pyelonephritis: Admitted from 2/28-3/9/24 with ESBL E coli pyelonephritis. Treated with 14 days of ertapenem (3/1/2024-3/14/2024).    CAP: Occurred during 2/28-3/9/24 admission. BAL negative for fungal etiology. Given azithro x3 days and ertapenem (see above). AFB x3 negative.   Admission for fevers, unexplained sepsis (4/12-4/17/24): Admitted with hypotension, fevers, leukocytosis. CT C/A/P with some lower lobe peribronchovascular GGOs, but these were actually improved from CT chest on 3/22/24. UA relatively unremarkable and urine culture with mixed urogenital  luc. Work-up included negative blood cultures, urine culture. Ultimately, improved with 7 days of antibiotics (CTX--> levofloxacin).           Exposure History:   Tobacco: No, did before transplant. Started about 20, about 0.5 packs per week.  Other inhaled substances (Vaping, hookah. 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  GERD: Never         Past Medical History:     Past Medical History:   Diagnosis Date     Anemia      Biliary stricture of transplanted liver (H) 2018     BPH (benign prostatic hyperplasia)      Cancer (H)     hepatocellualr carcinoma     Cirrhosis of liver (H) 2018     Diabetes (H)      Enlarged prostate      Hypothyroidism      Impotence of organic origin 2020     Inguinal hernia     Repaired with mesh on 18     Liver lesion 2018     Liver transplanted (H) 2018     donor liver transplant     Portal vein thrombosis     on path explant     Postoperative atrial fibrillation (H) 2018     Prostate cancer (H)      TB lung, latent     Treated              Past Surgical History:      Past Surgical History:   Procedure Laterality Date     APPENDECTOMY       BRONCHOSCOPY (RIGID OR FLEXIBLE), DIAGNOSTIC N/A 2024    Procedure: BRONCHOSCOPY, WITH BRONCHOALVEOLAR LAVAGE;  Surgeon: Jimmy Farley MD;  Location:  GI     COLONOSCOPY           CV CORONARY ANGIOGRAM N/A 10/13/2021    Procedure: CV CORONARY ANGIOGRAM;  Surgeon: Yaya Hampton MD;  Location:  HEART CARDIAC CATH LAB     CV CORONARY LITHOTRIPSY PCI N/A 10/13/2021    Procedure: CV Coronary Lithotripsy PCI;  Surgeon: Yaya Hampton MD;  Location:  HEART CARDIAC CATH LAB     CV INSTANTANEOUS WAVE-FREE RATIO N/A 10/13/2021    Procedure: Instantaneous Wave-Free Ratio;  Surgeon: Yaya Hampton MD;  Location:  HEART CARDIAC CATH LAB     CV INTRAVASULAR ULTRASOUND N/A 10/13/2021    Procedure: Intravascular  "Ultrasound;  Surgeon: Yaya Hampton MD;  Location: [facility]     CV PCI STENT DRUG ELUTING N/A 10/13/2021    Procedure: Percutaneous Coronary Intervention Stent Drug Eluting;  Surgeon: Yaya Hampton MD;  Location:  HEART CARDIAC CATH LAB     DAVINCI PROSTATECTOMY N/A 2020    Procedure: Robot-assisted laparoscopic radical prostatectomy, Bladder biopsy;  Surgeon: Kenrick Casiano MD;  Location: [facility]     ENDOSCOPIC RETROGRADE CHOLANGIOPANCREATOGRAM N/A 2018    Procedure: ENDOSCOPIC RETROGRADE CHOLANGIOPANCREATOGRAM, with Billary Sphincterotomy and Billary Stent Placement;  Surgeon: Omero Lawler MD;  Location: [facility] OR     ENDOSCOPIC RETROGRADE CHOLANGIOPANCREATOGRAM  2019    Procedure: COMBINED ENDOSCOPIC RETROGRADE CHOLANGIOPANCREATOGRAPHY, Bile Duct Stent Exchange;  Surgeon: Omero Lawler MD;  Location: [facility] OR     ENDOSCOPIC RETROGRADE CHOLANGIOPANCREATOGRAM N/A 2019    Procedure: ENDOSCOPIC RETROGRADE CHOLANGIOPANCREATOGRAPHY, WITH BILIARY STENT REMOVAL AND STONE EXTRACTION;  Surgeon: Omero Lawler MD;  Location: [facility] OR     HERNIORRHAPHY INGUINAL  2018    Procedure: Herniorrhaphy inguinal;  Surgeon: Emil Echevarria MD;  Location: [facility] OR     IR LIVER BIOPSY PERCUTANEOUS  2018     LAPAROTOMY EXPLORATORY      per the patient \"for infection in the LLQ\"      PICC INSERTION Right 2024    47 cm basilic     TRANSPLANT LIVER RECIPIENT  DONOR N/A 2018    Procedure: TRANSPLANT LIVER RECIPIENT  DONOR;  Surgeon: Emil Echevarria MD;  Location: [facility] OR             Social History:     Social History     Socioeconomic History     Marital status:      Spouse name: Not on file     Number of children: Not on file     Years of education: Not on file     Highest education level: Not on file   Occupational History     Not on file   Tobacco Use     Smoking status: Former     " Current packs/day: 0.00     Average packs/day: (1.4 ttl pk-yrs)     Types: Cigarettes, Cigars     Start date: 1970     Quit date: 3/14/2018     Years since quittin.4     Smokeless tobacco: Never     Tobacco comments:     Quit smoking 2018, one cigarette after dinner   Vaping Use     Vaping status: Never Used   Substance and Sexual Activity     Alcohol use: No     Comment: Quit drinking 2018     Drug use: No     Sexual activity: Not on file   Other Topics Concern     Parent/sibling w/ CABG, MI or angioplasty before 65F 55M? Not Asked   Social History Narrative    Born in [place], came to [place].     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[place], trimming meats     Social Determinants of Health     Financial Resource Strain: Not on file   Food Insecurity: Not on file   Transportation Needs: Not on file   Physical Activity: Not on file   Stress: Not on file   Social Connections: Not on file   Interpersonal Safety: Low Risk  (3/20/2024)    Interpersonal Safety      Do you feel physically and emotionally safe where you currently live?: Yes      Within the past 12 months, have you been hit, slapped, kicked or otherwise physically hurt by someone?: No      Within the past 12 months, have you been humiliated or emotionally abused in other ways by your partner or ex-partner?: No   Housing Stability: Not on file            Family History:     Family History   Problem Relation Age of Onset     Memory loss Mother      Liver Disease Brother      Skin Cancer No family hx of      Melanoma No family hx of              Immunizations:     Immunization History   Administered Date(s) Administered     COVID-19 12+ (-) (Pfizer) 2024     COVID-19 Bivalent 12+ (Pfizer) 10/26/2022, 2023     COVID-19 Monovalent 18+ (Moderna) 2021, 2021, 2021     COVID-19 Monovalent Booster 18+ (Moderna) 2022     DTaP, Unspecified 2018     Influenza (High Dose) 3 valent vaccine 10/24/2018,  10/16/2019     Influenza (IIV3) PF 11/15/2000     Influenza Vaccine 65+ (Fluzone HD) 09/25/2020, 10/21/2021, 10/26/2022, 03/20/2024     Pneumo Conj 13-V (2010&after) 08/21/2018, 01/13/2020     Pneumococcal 23 valent 12/04/2018     RSV Vaccine (Arexvy) 04/23/2024     TD,PF 7+ (Tenivac) 11/15/2000     TDAP (Adacel,Boostrix) 08/21/2018     TDAP Vaccine (Boostrix) 08/21/2018     Zoster recombinant adjuvanted (SHINGRIX) 08/21/2018, 10/24/2018             Allergies:   No Known Allergies          Medications:     Current Outpatient Medications   Medication Sig Dispense Refill     Alcohol Swabs PADS Use to swab the area of the injection or fam as directed Per insurance coverage 200 each 1     amoxicillin-clavulanate (AUGMENTIN) 875-125 MG tablet Take 1 tablet by mouth 2 times daily 20 tablet 0     apixaban ANTICOAGULANT (ELIQUIS) 5 MG tablet Take 1 tablet (5 mg) by mouth 2 times daily 60 tablet 1     atorvastatin (LIPITOR) 40 MG tablet Take 1 tablet (40 mg) by mouth daily 90 tablet 3     blood glucose (NO BRAND SPECIFIED) lancets standard Use to test blood sugar 3 times daily or as directed.Please dispense insurance covered brand 300 Lancet 3     blood glucose (NO BRAND SPECIFIED) lancets standard To use to test glucose level in the blood Use to test blood sugar  3  times daily as directed. To accompany glucose monitor brands per insurance coverage. 200 each 1     blood glucose (NO BRAND SPECIFIED) test strip Use to test blood sugar 3 times daily or as directed. Please dispense insurance covered brand 300 strip 3     blood glucose (NO BRAND SPECIFIED) test strip To use to test glucose level in the blood Use to test blood sugar  3 times daily as directed. To accompany glucose monitor brands per insurance coverage. 100 strip 3     blood glucose (ONE TOUCH SURESOFT) lancing device Lancing device to be used with lancets. 1 each 0     blood glucose monitoring (NO BRAND SPECIFIED) meter device kit Use to test blood sugar 3  times daily or as directed. Please dispense insurance covered brand 1 kit 0     blood glucose monitoring (NO BRAND SPECIFIED) meter device kit Use as directed Per insurance coverage 1 kit 0     blood glucose monitoring (ONE TOUCH ULTRA 2) meter device kit Use to test blood sugar 2 times daily or as directed. 1 kit 0     blood glucose monitoring (ONE TOUCH ULTRASOFT) lancets Use to test blood sugar 2 times daily. 100 each 6     diltiazem ER COATED BEADS (CARDIZEM CD/CARTIA XT) 240 MG 24 hr capsule Take 1 capsule (240 mg) by mouth daily 90 capsule 1     doxycycline hyclate (VIBRAMYCIN) 100 MG capsule Take 1 capsule (100 mg) by mouth 2 times daily 20 capsule 0     hydrALAZINE (APRESOLINE) 25 MG tablet Take 1 tablet (25 mg) by mouth 2 times daily 180 tablet 3     insulin glargine (LANTUS PEN) 100 UNIT/ML pen Inject 28 units subcutaneous at hs. 20 mL 3     insulin glargine (LANTUS SOLOSTAR) 100 UNIT/ML pen Inject 20 Units Subcutaneous at bedtime 15 mL 1     insulin pen needle (31G X 5 MM) 31G X 5 MM miscellaneous Use one  pen needles daily or as directed. 100 each 3     levofloxacin (LEVAQUIN) 500 MG tablet Take 1 tablet (500 mg) by mouth daily 3 tablet 0     levothyroxine (SYNTHROID/LEVOTHROID) 150 MCG tablet Take 1 tablet (150 mcg) by mouth daily 90 tablet 3     metFORMIN (GLUCOPHAGE XR) 500 MG 24 hr tablet Take 1,000 mg by mouth 2 times daily (with meals)       methocarbamol (ROBAXIN) 500 MG tablet Take 500 mg by mouth 4 times daily as needed for muscle spasms       mycophenolate (GENERIC EQUIVALENT) 250 MG capsule Take 2 capsules (500 mg) by mouth 2 times daily for 90 days 90 capsule 3     mycophenolate (GENERIC EQUIVALENT) 500 MG tablet Take 500 mg by mouth 2 times daily       predniSONE (DELTASONE) 5 MG tablet Take 1 tablet (5 mg) by mouth daily 90 tablet 1     sulfamethoxazole-trimethoprim (BACTRIM DS) 800-160 MG tablet Take 1 tablet by mouth 2 times daily 14 tablet 0     ursodiol (ACTIGALL) 250 MG tablet Take 1  tablet (250 mg) by mouth 2 times daily 180 tablet 3     Vitamin D3 (CHOLECALCIFEROL) 25 mcg (1000 units) tablet Take 2 tablets (50 mcg) by mouth daily 180 tablet 3     No current facility-administered medications for this visit.            Review of Systems:     Review of Systems   Constitutional:  Negative for chills, fever, malaise/fatigue and weight loss.   Respiratory:  Positive for cough, sputum production, shortness of breath and wheezing. Negative for hemoptysis.             Exam:   BP (!) 164/94   Pulse 68   SpO2 97%     Physical Exam  Vitals and nursing note reviewed.   Constitutional:       Appearance: Normal appearance.   Cardiovascular:      Rate and Rhythm: Normal rate and regular rhythm.      Heart sounds: No murmur heard.  Pulmonary:      Effort: Pulmonary effort is normal.      Comments: Faint wheeze throughout, otherwise clear  Neurological:      Mental Status: He is alert.       Fingernails without clubbing         Data:     PFT: 8/6/24      Mild restriction      CT Chest 5/31/24  1. Left upper lobe predominant peribronchial vascular groundglass and  consolidative opacities likely representative of an ongoing  infectious/inflammatory process/organizing pneumonia.  2. Improved appearance of left lower lobe predominant groundglass  opacities which are nearly resolved.  3. Redemonstration of mild pulmonary artery enlargement. Correlate  clinically for pulmonary hypertension.      3/5/24 bronch  125 nucleated cells, 87% monos/macros    Sputum 6/11 and 7/23 with >10 squam but AFB neg        All studies listed here were independently reviewed and interpreted by me today.          Assessment and Plan:     ## Pulmonary opacities  ## Chronic productive cough  ## History of liver transplant  ## History of latent TB SP treatment  ## History of tobacco use    22 packyr smoking history, quit in 2018 at the time of his liver transplant. History of latent TB s/p treatment. Having resp issues since April 2024.  Multiple rounds of antibiotics including Levaquin, doxy, azithro, Augmentin, and ertapenem. Multiple sputum cultures including induced sputum all with oral contamination but with negative AFB. Bronch March 2024 without abnormality. Imaging  with diffuse GGO. Inflammatory markers have been elevated in the past.    PFT's from 2018 WNL, PFT's from August with possible restriction but patient had difficulty with the maneuvers so unclear if this is reliable      In April he was treated with a course of Levaquin, in May he was hospitalized and treated with a course of doxycycline, and in June was treated with Augmentin.        - CBC, CRP, ESR  - Induced sputum        ADDENDUM 8/19  IgE and ABPA panel neg so aspergillus less likely. HIV negative. DEYSI, ANCA, CCP, RF negative so autoimmune less likely. CRP and ESR now WNL. CBC without leukocytosis, EOS WNL.    Will repeat CT to determine if opacities are still present. If so will likely need bronchoscopy. If CT is clean will consider repeat PFT's to determine if possible asthma or other abnormality.      Return to clinic: October      Chaitanya Earl M.D.  Pulmonary & Critical Care  Pager: Click Here to page      The above note was dictated using voice recognition software and may include typographical errors. Please contact the author for any clarifications.      Again, thank you for allowing me to participate in the care of your patient.        Sincerely,        Chaitanya Earl MD

## 2024-08-08 LAB
DLCOUNC-%PRED-PRE: 95 %
DLCOUNC-PRE: 22.45 ML/MIN/MMHG
DLCOUNC-PRED: 23.53 ML/MIN/MMHG
ERV-%PRED-PRE: 18 %
ERV-PRE: 0.23 L
ERV-PRED: 1.28 L
EXPTIME-PRE: 8.96 SEC
FEF2575-%PRED-PRE: 50 %
FEF2575-PRE: 1.07 L/SEC
FEF2575-PRED: 2.11 L/SEC
FEFMAX-%PRED-PRE: 58 %
FEFMAX-PRE: 4.46 L/SEC
FEFMAX-PRED: 7.58 L/SEC
FEV1-%PRED-PRE: 70 %
FEV1-PRE: 1.89 L
FEV1FEV6-PRE: 69 %
FEV1FEV6-PRED: 78 %
FEV1FVC-PRE: 67 %
FEV1FVC-PRED: 77 %
FEV1SVC-PRE: 70 %
FEV1SVC-PRED: 70 %
FIFMAX-PRE: 6.3 L/SEC
FRCPLETH-%PRED-PRE: 80 %
FRCPLETH-PRE: 2.86 L
FRCPLETH-PRED: 3.53 L
FVC-%PRED-PRE: 80 %
FVC-PRE: 2.8 L
FVC-PRED: 3.48 L
IC-%PRED-PRE: 96 %
IC-PRE: 2.47 L
IC-PRED: 2.57 L
RVPLETH-%PRED-PRE: 102 %
RVPLETH-PRE: 2.63 L
RVPLETH-PRED: 2.57 L
TLCPLETH-%PRED-PRE: 81 %
TLCPLETH-PRE: 5.33 L
TLCPLETH-PRED: 6.52 L
VA-%PRED-PRE: 81 %
VA-PRE: 4.73 L
VC-%PRED-PRE: 70 %
VC-PRE: 2.7 L
VC-PRED: 3.85 L

## 2024-08-13 ENCOUNTER — OFFICE VISIT (OUTPATIENT)
Dept: PULMONOLOGY | Facility: CLINIC | Age: 69
End: 2024-08-13
Attending: INTERNAL MEDICINE
Payer: COMMERCIAL

## 2024-08-13 ENCOUNTER — LAB (OUTPATIENT)
Dept: LAB | Facility: CLINIC | Age: 69
End: 2024-08-13
Payer: COMMERCIAL

## 2024-08-13 ENCOUNTER — TELEPHONE (OUTPATIENT)
Dept: PULMONOLOGY | Facility: CLINIC | Age: 69
End: 2024-08-13
Payer: COMMERCIAL

## 2024-08-13 ENCOUNTER — OFFICE VISIT (OUTPATIENT)
Dept: INFECTIOUS DISEASES | Facility: CLINIC | Age: 69
End: 2024-08-13
Attending: INTERNAL MEDICINE
Payer: COMMERCIAL

## 2024-08-13 VITALS
HEART RATE: 76 BPM | SYSTOLIC BLOOD PRESSURE: 166 MMHG | TEMPERATURE: 98.3 F | BODY MASS INDEX: 30.83 KG/M2 | WEIGHT: 196.4 LBS | HEIGHT: 67 IN | DIASTOLIC BLOOD PRESSURE: 91 MMHG | OXYGEN SATURATION: 95 %

## 2024-08-13 DIAGNOSIS — J45.991 COUGH VARIANT ASTHMA: ICD-10-CM

## 2024-08-13 DIAGNOSIS — R91.8 PULMONARY INFILTRATES: ICD-10-CM

## 2024-08-13 DIAGNOSIS — J18.9 PNEUMONIA OF LEFT UPPER LOBE DUE TO INFECTIOUS ORGANISM: ICD-10-CM

## 2024-08-13 DIAGNOSIS — Z94.2 LUNG REPLACED BY TRANSPLANT (H): ICD-10-CM

## 2024-08-13 DIAGNOSIS — N39.0 URINARY TRACT INFECTION DUE TO EXTENDED-SPECTRUM BETA LACTAMASE (ESBL) PRODUCING ESCHERICHIA COLI: ICD-10-CM

## 2024-08-13 DIAGNOSIS — B96.29 URINARY TRACT INFECTION DUE TO EXTENDED-SPECTRUM BETA LACTAMASE (ESBL) PRODUCING ESCHERICHIA COLI: ICD-10-CM

## 2024-08-13 DIAGNOSIS — Z94.4 LIVER REPLACED BY TRANSPLANT (H): ICD-10-CM

## 2024-08-13 DIAGNOSIS — Z23 ENCOUNTER FOR IMMUNIZATION: ICD-10-CM

## 2024-08-13 DIAGNOSIS — Z16.12 URINARY TRACT INFECTION DUE TO EXTENDED-SPECTRUM BETA LACTAMASE (ESBL) PRODUCING ESCHERICHIA COLI: ICD-10-CM

## 2024-08-13 LAB
ALBUMIN MFR UR ELPH: 372 MG/DL
ALBUMIN SERPL BCG-MCNC: 3.4 G/DL (ref 3.5–5.2)
ALBUMIN UR-MCNC: 300 MG/DL
ALP SERPL-CCNC: 155 U/L (ref 40–150)
ALT SERPL W P-5'-P-CCNC: 15 U/L (ref 0–70)
ANION GAP SERPL CALCULATED.3IONS-SCNC: 8 MMOL/L (ref 7–15)
APPEARANCE UR: CLEAR
AST SERPL W P-5'-P-CCNC: 16 U/L (ref 0–45)
BACTERIA SPT CULT: NORMAL
BACTERIA SPT CULT: NORMAL
BASOPHILS # BLD AUTO: 0 10E3/UL (ref 0–0.2)
BASOPHILS NFR BLD AUTO: 1 %
BILIRUB DIRECT SERPL-MCNC: 0.21 MG/DL (ref 0–0.3)
BILIRUB SERPL-MCNC: 0.6 MG/DL
BILIRUB UR QL STRIP: NEGATIVE
BUN SERPL-MCNC: 20.3 MG/DL (ref 8–23)
CALCIUM SERPL-MCNC: 8.8 MG/DL (ref 8.8–10.4)
CHLORIDE SERPL-SCNC: 102 MMOL/L (ref 98–107)
CHOLEST SERPL-MCNC: 146 MG/DL
COLOR UR AUTO: ABNORMAL
CREAT SERPL-MCNC: 1.06 MG/DL (ref 0.67–1.17)
CREAT UR-MCNC: 62.4 MG/DL
CRP SERPL-MCNC: 4.43 MG/L
EGFRCR SERPLBLD CKD-EPI 2021: 75 ML/MIN/1.73M2
EOSINOPHIL # BLD AUTO: 0.1 10E3/UL (ref 0–0.7)
EOSINOPHIL NFR BLD AUTO: 1 %
ERYTHROCYTE [DISTWIDTH] IN BLOOD BY AUTOMATED COUNT: 14.1 % (ref 10–15)
ERYTHROCYTE [SEDIMENTATION RATE] IN BLOOD BY WESTERGREN METHOD: 15 MM/HR (ref 0–20)
FASTING STATUS PATIENT QL REPORTED: NO
FASTING STATUS PATIENT QL REPORTED: NO
GLUCOSE SERPL-MCNC: 307 MG/DL (ref 70–99)
GLUCOSE UR STRIP-MCNC: 500 MG/DL
GRAM STAIN RESULT: NORMAL
HCO3 SERPL-SCNC: 25 MMOL/L (ref 22–29)
HCT VFR BLD AUTO: 44.6 % (ref 40–53)
HDLC SERPL-MCNC: 73 MG/DL
HGB BLD-MCNC: 14.5 G/DL (ref 13.3–17.7)
HGB UR QL STRIP: ABNORMAL
HIV 1+2 AB+HIV1 P24 AG SERPL QL IA: NONREACTIVE
HOLD SPECIMEN: NORMAL
IMM GRANULOCYTES # BLD: 0 10E3/UL
IMM GRANULOCYTES NFR BLD: 0 %
KETONES UR STRIP-MCNC: NEGATIVE MG/DL
LDLC SERPL CALC-MCNC: 53 MG/DL
LEUKOCYTE ESTERASE UR QL STRIP: NEGATIVE
LYMPHOCYTES # BLD AUTO: 0.9 10E3/UL (ref 0.8–5.3)
LYMPHOCYTES NFR BLD AUTO: 14 %
MAGNESIUM SERPL-MCNC: 1.9 MG/DL (ref 1.7–2.3)
MCH RBC QN AUTO: 27.9 PG (ref 26.5–33)
MCHC RBC AUTO-ENTMCNC: 32.5 G/DL (ref 31.5–36.5)
MCV RBC AUTO: 86 FL (ref 78–100)
MONOCYTES # BLD AUTO: 0.2 10E3/UL (ref 0–1.3)
MONOCYTES NFR BLD AUTO: 4 %
NEUTROPHILS # BLD AUTO: 5.3 10E3/UL (ref 1.6–8.3)
NEUTROPHILS NFR BLD AUTO: 80 %
NITRATE UR QL: NEGATIVE
NONHDLC SERPL-MCNC: 73 MG/DL
NRBC # BLD AUTO: 0 10E3/UL
NRBC BLD AUTO-RTO: 0 /100
PH UR STRIP: 6.5 [PH] (ref 5–7)
PHOSPHATE SERPL-MCNC: 3.5 MG/DL (ref 2.5–4.5)
PLATELET # BLD AUTO: 227 10E3/UL (ref 150–450)
POTASSIUM SERPL-SCNC: 5 MMOL/L (ref 3.4–5.3)
PROT SERPL-MCNC: 6.4 G/DL (ref 6.4–8.3)
PROT/CREAT 24H UR: 5.96 MG/MG CR (ref 0–0.2)
RBC # BLD AUTO: 5.19 10E6/UL (ref 4.4–5.9)
RBC URINE: 4 /HPF
RHEUMATOID FACT SERPL-ACNC: <10 IU/ML
SODIUM SERPL-SCNC: 135 MMOL/L (ref 135–145)
SP GR UR STRIP: 1.02 (ref 1–1.03)
TRIGL SERPL-MCNC: 102 MG/DL
UROBILINOGEN UR STRIP-MCNC: NORMAL MG/DL
WBC # BLD AUTO: 6.5 10E3/UL (ref 4–11)
WBC URINE: 1 /HPF

## 2024-08-13 PROCEDURE — 86038 ANTINUCLEAR ANTIBODIES: CPT | Performed by: INTERNAL MEDICINE

## 2024-08-13 PROCEDURE — 82785 ASSAY OF IGE: CPT | Mod: 90 | Performed by: PATHOLOGY

## 2024-08-13 PROCEDURE — 87070 CULTURE OTHR SPECIMN AEROBIC: CPT | Performed by: INTERNAL MEDICINE

## 2024-08-13 PROCEDURE — 86003 ALLG SPEC IGE CRUDE XTRC EA: CPT | Mod: 90 | Performed by: PATHOLOGY

## 2024-08-13 PROCEDURE — 82248 BILIRUBIN DIRECT: CPT | Performed by: PATHOLOGY

## 2024-08-13 PROCEDURE — 90677 PCV20 VACCINE IM: CPT | Performed by: INTERNAL MEDICINE

## 2024-08-13 PROCEDURE — 86036 ANCA SCREEN EACH ANTIBODY: CPT | Performed by: INTERNAL MEDICINE

## 2024-08-13 PROCEDURE — 99000 SPECIMEN HANDLING OFFICE-LAB: CPT | Performed by: PATHOLOGY

## 2024-08-13 PROCEDURE — 85652 RBC SED RATE AUTOMATED: CPT | Performed by: INTERNAL MEDICINE

## 2024-08-13 PROCEDURE — 99215 OFFICE O/P EST HI 40 MIN: CPT | Performed by: INTERNAL MEDICINE

## 2024-08-13 PROCEDURE — 86606 ASPERGILLUS ANTIBODY: CPT | Mod: 90 | Performed by: PATHOLOGY

## 2024-08-13 PROCEDURE — 250N000011 HC RX IP 250 OP 636: Performed by: INTERNAL MEDICINE

## 2024-08-13 PROCEDURE — 87389 HIV-1 AG W/HIV-1&-2 AB AG IA: CPT | Performed by: INTERNAL MEDICINE

## 2024-08-13 PROCEDURE — 86140 C-REACTIVE PROTEIN: CPT | Performed by: PATHOLOGY

## 2024-08-13 PROCEDURE — 83735 ASSAY OF MAGNESIUM: CPT | Performed by: PATHOLOGY

## 2024-08-13 PROCEDURE — 87102 FUNGUS ISOLATION CULTURE: CPT | Performed by: INTERNAL MEDICINE

## 2024-08-13 PROCEDURE — 80053 COMPREHEN METABOLIC PANEL: CPT | Performed by: PATHOLOGY

## 2024-08-13 PROCEDURE — 87116 MYCOBACTERIA CULTURE: CPT | Performed by: INTERNAL MEDICINE

## 2024-08-13 PROCEDURE — 82785 ASSAY OF IGE: CPT | Performed by: INTERNAL MEDICINE

## 2024-08-13 PROCEDURE — 87206 SMEAR FLUORESCENT/ACID STAI: CPT | Performed by: INTERNAL MEDICINE

## 2024-08-13 PROCEDURE — 84156 ASSAY OF PROTEIN URINE: CPT | Performed by: PATHOLOGY

## 2024-08-13 PROCEDURE — G0009 ADMIN PNEUMOCOCCAL VACCINE: HCPCS | Performed by: INTERNAL MEDICINE

## 2024-08-13 PROCEDURE — G2211 COMPLEX E/M VISIT ADD ON: HCPCS | Performed by: INTERNAL MEDICINE

## 2024-08-13 PROCEDURE — 36415 COLL VENOUS BLD VENIPUNCTURE: CPT | Performed by: PATHOLOGY

## 2024-08-13 PROCEDURE — 86200 CCP ANTIBODY: CPT | Performed by: INTERNAL MEDICINE

## 2024-08-13 PROCEDURE — G0463 HOSPITAL OUTPT CLINIC VISIT: HCPCS | Mod: 25 | Performed by: INTERNAL MEDICINE

## 2024-08-13 PROCEDURE — 84100 ASSAY OF PHOSPHORUS: CPT | Performed by: PATHOLOGY

## 2024-08-13 PROCEDURE — 80061 LIPID PANEL: CPT | Performed by: PATHOLOGY

## 2024-08-13 PROCEDURE — 85025 COMPLETE CBC W/AUTO DIFF WBC: CPT | Performed by: PATHOLOGY

## 2024-08-13 PROCEDURE — 94640 AIRWAY INHALATION TREATMENT: CPT | Performed by: INTERNAL MEDICINE

## 2024-08-13 PROCEDURE — 86431 RHEUMATOID FACTOR QUANT: CPT | Performed by: INTERNAL MEDICINE

## 2024-08-13 PROCEDURE — 99417 PROLNG OP E/M EACH 15 MIN: CPT | Performed by: INTERNAL MEDICINE

## 2024-08-13 PROCEDURE — 81001 URINALYSIS AUTO W/SCOPE: CPT | Performed by: PATHOLOGY

## 2024-08-13 RX ADMIN — PNEUMOCOCCAL 20-VALENT CONJUGATE VACCINE 0.5 ML
2.2; 2.2; 2.2; 2.2; 2.2; 2.2; 2.2; 2.2; 2.2; 2.2; 2.2; 2.2; 2.2; 2.2; 2.2; 2.2; 4.4; 2.2; 2.2; 2.2 INJECTION, SUSPENSION INTRAMUSCULAR at 14:29

## 2024-08-13 ASSESSMENT — PAIN SCALES - GENERAL: PAINLEVEL: NO PAIN (0)

## 2024-08-13 NOTE — PROGRESS NOTES
[facility]    Division of Infectious Diseases and International Medicine    TRANSPLANT INFECTIOUS DISEASE INPATIENT CONSULTATION NOTE   Patient:  Loy Limon, Date of birth 1955, Medical record number [id number]  Referred by: No ref. provider found   Reason for Consult: Cough, GGOs on CT chest        Assessment and Recommendations   Recommendations  Discussed with him and he noted that he has met his deductible for the year and does not usually pay for CT scans or lab draws. Thus, I think it's reasonable to complete full work-up for ABPA and autoimmune disease in addition to his infectious work-up. Will also repeat CT chest.   Will send to lab today to have ESR/CRP, CBC, CMP completed as per pulmonary recommendations.   Will obtain ABPA, HIV, and autoimmune testing (DEYSI, RF, CCP, ANCA)  Pulmonary will work to get him coordinated for an induced sputum for cultures (Fungal, AFB, bacterial) if his current sputum cultures aren't adequate.   Schedule CT chest in the next 2-3 weeks.   PCV-20 vaccine today   Advised him to make a follow-up visit with Dr. Anne to discuss ongoing management of hypertension.     Follow-up with me in ~4-5 weeks for ongoing work-up of pulmonary infiltrates    Alessandra Capone MD  [phone]    I spent >60 minutes reviewing the patient's medical record, examining the patient, documenting and coordinating care.     The longitudinal plan of care for his pulmonary infiltrates in setting of previous renal transplant as documented were addressed during this visit. Due to the added complexity in care, I will continue to support Loy in the subsequent management and with ongoing continuity of care.    Assessment  Mr. Limon is a 69 yo gentleman with history of prostate cancer s/p prostatectomy (2020), LTBI s/p rifampin x3 months and INH x2 months (2019), and EtOH cirrhosis c/b HCC (s/p chemoembolization and microwave ablationin 2018). He ultimately  underwent DDLT (12/22/2018). I am seeing him in follow-up today for ongoing work-up of GGOs/nodules seen on CT chest.      Cough, GGOs seen on CT chest (6/6/2024)  CT chest showing patchy bilateral groundglass opacities with lower lobe dominant starting on 3/3/2024.  He did undergo BAL on 3/4/2024, which had negative galactomannan, PJP, and all cultures including nocardia and AFB.  He also had sputum AFB x 3 during the hospital admission, all of which were negative. Repeat CT chest on 3/22/2024 with improving perihilar interstitial groundglass opacities.  CT chest from 4/14/2024 showed continued peribronchovascular GGO's.  His most recent CT chest from 5/31/2024 now shows left upper lobe predominant peribronchovascular groundglass opacities.     Repeat ID testing from 6/11/24, he had extensive infectious diseases testing, including negative Aspergillus galactomannan, serum CrAG, 1,3 BD glucan, serum/urine histo, and RVP testing. He has 1 sputum AFB culture that is also negative. Repeat sputum cultures on 6/11, 7/23, and 8/6 were inadequate samples. Repeat sputum cultures have been attempted again today. He did have a pulm visit on 8/9/24. At this point, planning to obtain induced sputum for cultures, ABPA testing, basic autoimmune work-up and inflammatory markers. We will also repeat a CT chest in the next 2-3 weeks to see the current status of his infiltrates. If these do not elucidate a clear cause, pulmonary will plan to have him undergo a repeat BAL to really rule out infectious causes. Of note, he does have a history of LTBI, but completed treatment in 2019. Thus, he is low risk for recurrent tuberculosis. [place], but his IgG was negative in 2018 and he doesn't have eosinophilia.     Other Infectious Disease issues include:  - QTc interval: Does have history of Qtc>500 [502 on 4/13/24]. Repeat was 486 on 7/2/24  - Bacterial prophylaxis: None  - Pneumocystis prophylaxis: Not  "applicable  - Serostatus & viral prophylaxis: CMV D-/R+; EBV D+/R+   - Fungal prophylaxis: Not applicable  - Isolation status: Contact (ESBL history)  - Ig,143 (2024)  - Code status: Full Code  - Antibiotic allergies: None     Recent Labs   Lab Test 24   IGG 1,143      Prior infectious diseases issues   + Quant 2018. Was initially started on deypiilr38sh/kg PO x4 months on 2018 (end date ~2024) but only completed 2.5 months worth. On ID clinic visit with Dr Clancy on 2019, was transitioned over to INH 300mg daily (due to rifampin DDI with transplant immunosuppressants) which he took for an additional ~2 months with end date on ~2019.   ESBL E coli Pyelonephritis: Admitted from -3/9/24 with ESBL E coli pyelonephritis. Treated with 14 days of ertapenem (3/1/2024-3/14/2024).    CAP: Occurred during -3/9/24 admission. BAL negative for fungal etiology. Given azithro x3 days and ertapenem for pyelonephritis (see above). AFB x3 negative.   Admission for fevers, unexplained sepsis (-24): Admitted with hypotension, fevers, leukocytosis. CT C/A/P with some lower lobe peribronchovascular GGOs, but these were actually improved from CT chest on 3/22/24. UA relatively unremarkable and urine culture with mixed urogenital luc. Work-up included negative blood cultures, urine culture. Ultimately, improved with 7 days of antibiotics (CTX--> levofloxacin).        Interval events    Since last seen by me in clinic on , he is feeling relatively the same as before. He continues to have this feeling of phlegm \"stuck\" in his throat. He doesn't note a significant cough. However, he does feel short of breath. Also notes some pain in his L posterior chest that occurs \"occasionally\" and is more noted when he wakes from sleep.      Saw pulmonary on 24. Planning to get induced sputum, inflammatory markers, basic labs. We will then move onto BAL if there are no clear answers. " Also on Ddx is autoimmune disease so feel that it is reasonable to     Was seen by oncology on  for routine visit. He is >5 years out from transplant with negative MRIs. Has planned to stop annual MRI.     Abx:   Doxycycline: -  Augmentin: ~-       Other Medical History:     Allergies Patient has no known allergies.    Past Medical History  Past Medical History:   Diagnosis Date    Anemia     Biliary stricture of transplanted liver (H) 2018    BPH (benign prostatic hyperplasia)     Cancer (H)     hepatocellualr carcinoma    Cirrhosis of liver (H) 2018    Diabetes (H)     Enlarged prostate     Hypothyroidism     Impotence of organic origin 2020    Inguinal hernia     Repaired with mesh on 18    Liver lesion 2018    Liver transplanted (H) 2018     donor liver transplant    Portal vein thrombosis     on path explant    Postoperative atrial fibrillation (H) 2018    Prostate cancer (H)     TB lung, latent     Treated     PastSurgical History   has a past surgical history that includes appendectomy; colonoscopy; Transplant liver recipient  donor (N/A, 2018); Herniorrhaphy inguinal (2018); Endoscopic retrograde cholangiopancreatogram (N/A, 2018); IR Liver Biopsy Percutaneous (2018); Endoscopic retrograde cholangiopancreatogram (2019); Endoscopic retrograde cholangiopancreatogram (N/A, 2019); Laparotomy exploratory; Davinci prostatectomy (N/A, 2020); Coronary Angiogram (N/A, 10/13/2021); Instantaneous Wave-Free Ratio (N/A, 10/13/2021); Percutaneous Coronary Intervention Stent (N/A, 10/13/2021); Intravascular Ultrasound (N/A, 10/13/2021); Percutaneous Coronary Intervention - Lithotripsy (N/A, 10/13/2021); Bronchoscopy (rigid or flexible), diagnostic (N/A, 2024); and picc insertion (Right, 2024).   Family History  Family History   Problem Relation Age of Onset    Memory loss Mother     Liver  Disease Brother     Skin Cancer No family hx of     Melanoma No family hx of     Social History  He reports that he quit smoking about 6 years ago. His smoking use included cigarettes and cigars. He started smoking about 54 years ago. He has a 1.4 pack-year smoking history. He has never used smokeless tobacco. He reports that he does not drink alcohol and does not use drugs.   Notable Exposures Listed below if pertinent    Vaccination History:  Immunization History   Administered Date(s) Administered    COVID-19 12+ (2023-24) (Pfizer) 03/20/2024    COVID-19 Bivalent 12+ (Pfizer) 10/26/2022, 05/09/2023    COVID-19 Monovalent 18+ (Moderna) 03/01/2021, 03/30/2021, 09/21/2021    COVID-19 Monovalent Booster 18+ (Moderna) 04/26/2022    DTaP, Unspecified 08/21/2018    Influenza (High Dose) 3 valent vaccine 10/24/2018, 10/16/2019    Influenza (IIV3) PF 11/15/2000    Influenza Vaccine 65+ (Fluzone HD) 09/25/2020, 10/21/2021, 10/26/2022, 03/20/2024    Pneumo Conj 13-V (2010&after) 08/21/2018, 01/13/2020    Pneumococcal 23 valent 12/04/2018    RSV Vaccine (Arexvy) 04/23/2024    TD,PF 7+ (Tenivac) 11/15/2000    TDAP (Adacel,Boostrix) 08/21/2018    TDAP Vaccine (Boostrix) 08/21/2018    Zoster recombinant adjuvanted (SHINGRIX) 08/21/2018, 10/24/2018             Physical Exam:     VITAL SIGNS:  There were no vitals taken for this visit.     GENERAL APPEARANCE: Not in acute distress. Lying in bed.     PHYSICAL EXAM:   Eyes:     no ptosis, no discharge, no scleral icterus  Mouth, Throat:     mucous membranes moist  Cardiovascular: Appears well-perfused.   Respiratory:  No wheezes/crackles noted   Skin:     Dry and intact   No rashes  Neurologic:     Higher Mental Function: Conversant, AOx4   Facial asymmetry grossly absent   is ambulatory  Psychiatric:     Appropriate           Laboratory Data:   Metabolic Studies       Recent Labs   Lab Test 06/11/24  1456 05/21/24  1633 05/02/24  1348 04/16/24  1011 04/16/24  0527 04/15/24  0959  04/15/24  0439 04/14/24  1253 04/14/24  1134 04/14/24  0840 09/08/23  2201 09/08/23  1253    137 135   < > 130*  --  135  --  133*  --    < > 142   POTASSIUM 4.9 4.6 5.1   < > 4.0  --  4.2  --  4.2  --    < > 6.0*   CHLORIDE 104 105 102   < > 99  --  101  --  103  --    < > 107   CO2 26 24 23   < > 21*  --  22  --  19*  --    < > 25   ANIONGAP 7 8 10   < > 10  --  12  --  11  --    < > 10   BUN 20.1 24.2* 17.2   < > 17.3  --  13.5  --  15.9  --    < > 23.9*   CR 0.91 1.26* 0.97   < > 0.96  --  0.94  --  0.89  --    < > 0.98   GFRESTIMATED 90 61 84   < > 85  --  87  --  >90  --    < > 83   * 260* 404*   < > 285*   < > 149*   < > 304*  --    < > 217*   A1C  --   --   --   --  8.4*  --   --   --   --   --    < >  --    YELENA 9.1 8.7* 8.7*   < > 8.2*  --  8.7*  --  8.6*  --    < > 9.3   PHOS  --   --   --   --   --   --   --   --   --   --   --  3.4   MAG  --   --  1.7  --   --   --   --   --   --   --    < >  --    LACT  --   --   --   --   --   --  1.6  --   --  0.9   < >  --    PCAL  --   --   --   --   --   --   --   --  30.80*  --    < >  --    FGTL  --   --   --   --   --   --   --   --  <31  --    < >  --     < > = values in this interval not displayed.       Hepatic Studies    Recent Labs   Lab Test 06/11/24  1456 05/21/24  1633 05/02/24  1348 03/20/24  1409 03/08/24  0526 03/05/24  0554 03/04/24  0626   BILITOTAL 0.5 0.4 0.6   < > 0.3   < > 0.3   DBIL  --   --   --   --  <0.20   < > <0.20   ALKPHOS 172* 221* 209*   < > 249*   < > 308*   PROTTOTAL 6.8 6.6 6.6   < > 5.5*   < > 5.5*   ALBUMIN 3.7 3.5 3.4*   < > 2.6*   < > 2.4*   AST 18 18 15   < > 18   < > 31   ALT 16 24 18   < > 19   < > 30   LDH  --   --   --   --   --   --  179    < > = values in this interval not displayed.       Hematology Studies      Recent Labs   Lab Test 06/11/24  1456 05/21/24  1633 05/02/24  1348 04/17/24  0607 04/16/24  0527 04/15/24  0439 07/21/21  1804 07/07/21  1059 03/12/19  0815 03/04/19  1218   WBC 7.4 7.4 6.6 5.2 4.7  6.0   < > 7.0   < > 3.6*   ANEU  --   --   --   --   --   --   --  4.6  --  2.3   ALYM  --   --   --   --   --   --   --  1.8  --  1.0   KM  --   --   --   --   --   --   --  0.4  --  0.2   AEOS  --   --   --   --   --   --   --  0.2  --  0.1   HGB 13.3 12.2* 12.0* 12.4* 12.4* 13.8   < > 14.0   < > 11.2*   HCT 40.9 37.9* 37.4* 37.2* 37.3* 42.8   < > 40.1   < > 34.4*    285 287 188 166 167   < > 208   < > 199    < > = values in this interval not displayed.       Arterial Blood Gas Testing    Recent Labs   Lab Test 04/12/24 2131 12/23/18 0404 12/22/18 2126 12/22/18 2010 12/22/18  1914 12/22/18  1700   PH  --  7.41 7.43 7.41 7.42 7.36   PCO2  --  41 41 42 39 41   PO2  --  71* 92 221* 247* 184*   HCO3  --  26 27 26 25 23   O2PER 21 40 40 75 75 75        Medication levels    Recent Labs   Lab Test 04/15/24  1541 03/23/22  0720   VANCOMYCIN 10.5  --    TACROL  --  <1.0*       Absolute CD4, [place] T Cells   Date Value Ref Range Status   06/11/2024 289 (L) 441 - 2,156 cells/uL Final       Inflammatory Markers    Recent Labs   Lab Test 07/24/24  1425   PSA <0.01       Immune Globulin Studies     Recent Labs   Lab Test 04/12/24  2117 03/24/21  0812   IGG 1,143 1,064   IGM  --  39   IGA  --  324       Pancreatitis testing    Recent Labs   Lab Test 04/13/24  0029 07/19/23  1411 07/10/19  0909 03/04/19  1218 02/18/19  0742 12/23/18  0404 12/22/18  0013   AMYLASE  --   --   --  44 38 46 67   LIPASE 19  --   --  42* 35* 93  --    TRIG  --  170*   < >  --   --   --   --     < > = values in this interval not displayed.       Gout Labs    No lab results found.    Clotting Studies    Recent Labs   Lab Test 04/13/24  0557 04/12/24  2117 02/29/24  1004 02/29/24  1004 03/11/23  0434   INR 1.21* 1.07  --  1.08 0.92   PTT  --  26   < > 43*  --     < > = values in this interval not displayed.         Markers    Recent Labs   Lab Test 03/07/22  1331 09/03/21  1757 02/01/21  0723 09/25/20  0820 07/03/20  1109 03/04/19  1218  "10/03/18  0923 09/18/18  1524 08/21/18  1050 07/19/18  1132 06/18/18  0747 05/29/18  0754   FETO <1.5 <1.5 1.7 <1.5 <1.5 2.5 5.9 4.4 4.5 4.9 3.8 4.0       Autoimmune Testing  No lab results found.    Invalid input(s): \"PRO3\", \"C3\", \"C4\", \"VASPAN\"    Thyroid Studies     Recent Labs   Lab Test 05/02/24  1348 04/13/24  0029 03/02/24  0751 09/08/23  1253 06/14/23  1556   TSH 7.75* 0.32 5.47* 2.17 4.38*   T4 0.96  --  1.24  --  1.18       Urine Studies     Recent Labs   Lab Test 04/12/24  2134 03/20/24  1413 02/28/24  2035 09/08/23  2306 07/19/23  1427   URINEPH 6.0 5.5 5.5 5.5 6.0   NITRITE Negative Negative Negative Negative Negative   LEUKEST Small* Small* Small* Negative Negative   WBCU 79* 40* 49* 1 1       CSF testing   No lab results found.    Invalid input(s): \"CADAM\", \"EVPCR\", \"ENTPCR\", \"ENTEROVIRUS\"    Body fluid stats    Recent Labs   Lab Test 03/05/24  1131 12/25/18  0719   FCOL Colorless  --    FAPR Clear  --    FWBC 125  --    FNEU 6  --    FLYM 7  --    FMONO 87  --    GS  --  >10 Squamous epithelial cells/low power field indicates oral contamination. Please   recollect.  *  <25 PMNs/low power field  Moderate  Mixed gram positive luc         Microbiology:  Fungal testing  Recent Labs   Lab Test 06/11/24  1456 04/14/24  1134 03/05/24  1131 03/04/24  1129   FGTL  --  <31  --  <31   ASPGAI 0.06  --  0.14 0.05   ASPAG  --   --  Negative  --    ASPGAA Negative  --   --  Negative   COFUNG  --   --   --  <1:2       Culture   Date Value Ref Range Status   08/06/2024   Final    >10 Squamous epithelial cells/low power field indicates oral contamination. Please recollect.   04/14/2024 No Growth  Final   04/14/2024 No Growth  Final   04/12/2024 >100,000 CFU/mL Mixture of Urogenital Luc  Final   04/12/2024 No Growth  Final   04/12/2024 No Growth  Final   03/20/2024 10,000-50,000 CFU/mL Escherichia coli ESBL (A)  Final   03/05/2024 No Legionella species isolated  Final   03/05/2024 No Growth  Final   03/05/2024 No " "Growth  Final   03/05/2024 1+ Normal luc  Final   02/29/2024 3+ Normal luc  Final   02/28/2024 No Growth  Final   02/28/2024 No Growth  Final   02/28/2024 50,000-100,000 CFU/mL Escherichia coli ESBL (A)  Final   03/11/2023 >100,000 CFU/mL Escherichia coli (A)  Final     GS Culture   Date Value Ref Range Status   07/23/2024 See corresponding culture for results  Final   06/11/2024 See corresponding culture for results  Final     Culture Micro   Date Value Ref Range Status   02/18/2020   Final    <10,000 colonies/mL  urogenital luc  Susceptibility testing not routinely done     12/16/2019 <10,000 colonies/mL  urogenital luc    Final   12/16/2019 Susceptibility testing not routinely done  Final   10/10/2019 No growth  Final   12/25/2018 (A)  Final    Canceled, Test credited  >10 Squamous epithelial cells/low power field indicates oral contamination. Please   recollect.     12/25/2018   Final    Notification of test cancellation was given to  Tahira Reis RN at 1000 12.25.18     12/24/2018 No growth  Final   12/24/2018 No growth  Final   12/22/2018 No anaerobes isolated  Final   12/22/2018 No growth  Final   12/22/2018 Culture negative after 30 days  Final   12/22/2018 No VRE isolated  Final   (    Last check of C difficile  No results found for: \"CDBPCT\"    Infection Studies to assess Diarrhea No lab results found.    Virology:  Coronavirus-19 testing    Recent Labs   Lab Test 04/12/24  2330 02/28/24  2039 10/11/21  0806 10/01/21  1141   [id number] Negative Negative Negative Negative       Respiratory virus testing    Recent Labs   Lab Test 06/11/24  1406 04/12/24  2330 03/05/24  1131   IFLUA Not Detected Not Detected Not Detected   INFZA  --  Negative  --    FLUAH1 Not Detected Not Detected Not Detected   RT5245 Not Detected Not Detected Not Detected   FLUAH3 Not Detected Not Detected Not Detected   IFLUB Not Detected Not Detected Not Detected   INFZB  --  Negative  --    PIV1 Not Detected Not Detected Not " Detected   PIV2 Not Detected Not Detected Not Detected   PIV3 Not Detected Not Detected Not Detected   PIV4 Not Detected Not Detected Not Detected   IRSV  --  Negative  --    RSVA Not Detected Not Detected Not Detected   RSVB Not Detected Not Detected Not Detected   HMPV Not Detected Not Detected Not Detected   ADENOV Not Detected Not Detected Not Detected   CORONA Not Detected Not Detected Not Detected       CMV Antibody IgG   Date Value Ref Range Status   12/22/2018 >8.0 (H) 0.0 - 0.8 AI Final     Comment:     Positive  Antibody index (AI) values reflect qualitative changes in antibody   concentration that cannot be directly associated with clinical condition or   disease state.     07/19/2018 >8.0 (H) 0.0 - 0.8 AI Final     Comment:     Positive  Antibody index (AI) values reflect qualitative changes in antibody   concentration that cannot be directly associated with clinical condition or   disease state.       CMV Antibody IgM   Date Value Ref Range Status   12/22/2018 <0.2 0.0 - 0.8 AI Final     Comment:     Negative  Antibody index (AI) values reflect qualitative changes in antibody   concentration that cannot be directly associated with clinical condition or   disease state.       EBV Capsid Antibody IgG   Date Value Ref Range Status   12/22/2018 >8.0 (H) 0.0 - 0.8 AI Final     Comment:     Positive, suggests recent or past exposure  Antibody index (AI) values reflect qualitative changes in antibody   concentration that cannot be directly associated with clinical condition or   disease state.     07/19/2018 7.7 (H) 0.0 - 0.8 AI Final     Comment:     Positive, suggests recent or past exposure  Antibody index (AI) values reflect qualitative changes in antibody   concentration that cannot be directly associated with clinical condition or   disease state.       Toxoplasma Antibody IgG   Date Value Ref Range Status   09/18/2018 <3.0 0.0 - 7.1 IU/mL Final     Comment:     Negative- Absence of detectable Toxoplasma  "gondii IgG antibodies. A negative   result does not rule out acute infection.  The magnitude of the measured result is not indicative of the amount of   antibody present. The concentrations of anti-Toxoplasma gondii IgG in a given   specimen determined with assays from different manufacturers can vary due to   differences in assay methods and reagent specificity.       EBV Capsid Antibody IgM   Date Value Ref Range Status   12/22/2018 <0.2 0.0 - 0.8 AI Final     Comment:     No detectable antibody.  Antibody index (AI) values reflect qualitative changes in antibody   concentration that cannot be directly associated with clinical condition or   disease state.         CMV viral loads    Recent Labs   Lab Test 04/14/24  1256   CMVQNT Not Detected       CMV resistance testing  No lab results found.    No results found for: \"H6RES\"    No results found for: \"EBRES\"    No results found for: \"56470\", \"BKRES\"    Parvovirus Testing  No lab results found.    Invalid input(s): \"PRVRES\"    Adenovirus Testing  No lab results found.    Invalid input(s): \"ADENAB\", \"ADENOVIRUS\", \"ADQT\"    Hepatitis B Testing     Recent Labs   Lab Test 03/20/24  1409 12/22/18  0013   AUSAB  --  67.96*   HBCAB  --  Reactive*   HEPBANG Nonreactive  --         Hepatitis C Antibody   Date Value Ref Range Status   12/22/2018 Nonreactive NR^Nonreactive Final     Comment:     Assay performance characteristics have not been established for newborns,   infants, and children         Imaging:  No results found for this or any previous visit (from the past 48 hour(s)).       "

## 2024-08-13 NOTE — PROGRESS NOTES
This patient comes into clinic  today at the request of Dr. Eral for a sputum induction.    Patient was first given 2.5 mg of albuterol nebulizer, after which 5mL 10% hypertonic saline was administered via nebulizer.      Pre-treatment: SpO2= 97% HR= 74 BBS= clear  Post-treatment: SpO2= 97%  HR= 94 BBS= clear    Patient was successful in producing a sample.    No adverse side effects noted by the patient.    This service provided today was under the direct supervision of Dr. Curran, who was available if needed.

## 2024-08-13 NOTE — LETTER
8/13/2024       RE: Loy Limon  [address]  [facility] [address]     Dear Colleague,    Thank you for referring your patient, Loy Limon, to the [facility] INFECTIOUS DISEASE CLINIC [place] at [facility]. Please see a copy of my visit note below.     [facility]    Division of Infectious Diseases and International Medicine    TRANSPLANT INFECTIOUS DISEASE INPATIENT CONSULTATION NOTE   Patient:  Loy Limon, Date of birth 1955, Medical record number [id number]  Referred by: No ref. provider found   Reason for Consult: Cough, GGOs on CT chest        Assessment and Recommendations   Recommendations  Discussed with him and he noted that he has met his deductible for the year and does not usually pay for CT scans or lab draws. Thus, I think it's reasonable to complete full work-up for ABPA and autoimmune disease in addition to his infectious work-up. Will also repeat CT chest.   Will send to lab today to have ESR/CRP, CBC, CMP completed as per pulmonary recommendations.   Will obtain ABPA, HIV, and autoimmune testing (DEYSI, RF, CCP, ANCA)  Pulmonary will work to get him coordinated for an induced sputum for cultures (Fungal, AFB, bacterial) if his current sputum cultures aren't adequate.   Schedule CT chest in the next 2-3 weeks.   PCV-20 vaccine today   Advised him to make a follow-up visit with Dr. Anne to discuss ongoing management of hypertension.     Follow-up with me in ~4-5 weeks for ongoing work-up of pulmonary infiltrates    Alessandra Capone MD  [phone]    I spent >60 minutes reviewing the patient's medical record, examining the patient, documenting and coordinating care.     The longitudinal plan of care for his pulmonary infiltrates in setting of previous renal transplant as documented were addressed during this visit. Due to the added complexity in care, I will continue to support  Loy in the subsequent management and with ongoing continuity of care.    Assessment  Mr. Limon is a 67 yo gentleman with history of prostate cancer s/p prostatectomy (2020), LTBI s/p rifampin x3 months and INH x2 months (2019), and EtOH cirrhosis c/b HCC (s/p chemoembolization and microwave ablationin 2018). He ultimately underwent DDLT (12/22/2018). I am seeing him in follow-up today for ongoing work-up of GGOs/nodules seen on CT chest.      Cough, GGOs seen on CT chest (6/6/2024)  CT chest showing patchy bilateral groundglass opacities with lower lobe dominant starting on 3/3/2024.  He did undergo BAL on 3/4/2024, which had negative galactomannan, PJP, and all cultures including nocardia and AFB.  He also had sputum AFB x 3 during the hospital admission, all of which were negative. Repeat CT chest on 3/22/2024 with improving perihilar interstitial groundglass opacities.  CT chest from 4/14/2024 showed continued peribronchovascular GGO's.  His most recent CT chest from 5/31/2024 now shows left upper lobe predominant peribronchovascular groundglass opacities.     Repeat ID testing from 6/11/24, he had extensive infectious diseases testing, including negative Aspergillus galactomannan, serum CrAG, 1,3 BD glucan, serum/urine histo, and RVP testing. He has 1 sputum AFB culture that is also negative. Repeat sputum cultures on 6/11, 7/23, and 8/6 were inadequate samples. Repeat sputum cultures have been attempted again today. He did have a pulm visit on 8/9/24. At this point, planning to obtain induced sputum for cultures, ABPA testing, basic autoimmune work-up and inflammatory markers. We will also repeat a CT chest in the next 2-3 weeks to see the current status of his infiltrates. If these do not elucidate a clear cause, pulmonary will plan to have him undergo a repeat BAL to really rule out infectious causes. Of note, he does have a history of LTBI, but completed treatment in 2019. Thus, he is low risk for  "recurrent tuberculosis. [place], but his IgG was negative in 2018 and he doesn't have eosinophilia.     Other Infectious Disease issues include:  - QTc interval: Does have history of Qtc>500 [502 on 24]. Repeat was 486 on 24  - Bacterial prophylaxis: None  - Pneumocystis prophylaxis: Not applicable  - Serostatus & viral prophylaxis: CMV D-/R+; EBV D+/R+   - Fungal prophylaxis: Not applicable  - Isolation status: Contact (ESBL history)  - Ig,143 (2024)  - Code status: Full Code  - Antibiotic allergies: None     Recent Labs   Lab Test 24  2117   IGG 1,143      Prior infectious diseases issues   + Quant 2018. Was initially started on [id number]/kg PO x4 months on 2018 (end date ~2024) but only completed 2.5 months worth. On ID clinic visit with Dr Clancy on 2019, was transitioned over to INH 300mg daily (due to rifampin DDI with transplant immunosuppressants) which he took for an additional ~2 months with end date on ~2019.   ESBL E coli Pyelonephritis: Admitted from -3/9/24 with ESBL E coli pyelonephritis. Treated with 14 days of ertapenem (3/1/2024-3/14/2024).    CAP: Occurred during -3/9/24 admission. BAL negative for fungal etiology. Given azithro x3 days and ertapenem for pyelonephritis (see above). AFB x3 negative.   Admission for fevers, unexplained sepsis (-24): Admitted with hypotension, fevers, leukocytosis. CT C/A/P with some lower lobe peribronchovascular GGOs, but these were actually improved from CT chest on 3/22/24. UA relatively unremarkable and urine culture with mixed urogenital luc. Work-up included negative blood cultures, urine culture. Ultimately, improved with 7 days of antibiotics (CTX--> levofloxacin).        Interval events    Since last seen by me in clinic on , he is feeling relatively the same as before. He continues to have this feeling of phlegm \"stuck\" in his throat. He doesn't note " "a significant cough. However, he does feel short of breath. Also notes some pain in his L posterior chest that occurs \"occasionally\" and is more noted when he wakes from sleep.      Saw pulmonary on 24. Planning to get induced sputum, inflammatory markers, basic labs. We will then move onto BAL if there are no clear answers. Also on Ddx is autoimmune disease so feel that it is reasonable to     Was seen by oncology on  for routine visit. He is >5 years out from transplant with negative MRIs. Has planned to stop annual MRI.     Abx:   Doxycycline: -  Augmentin: ~-       Other Medical History:     Allergies Patient has no known allergies.    Past Medical History  Past Medical History:   Diagnosis Date     Anemia      Biliary stricture of transplanted liver (H) 2018     BPH (benign prostatic hyperplasia)      Cancer (H)     hepatocellualr carcinoma     Cirrhosis of liver (H) 2018     Diabetes (H)      Enlarged prostate      Hypothyroidism      Impotence of organic origin 2020     Inguinal hernia     Repaired with mesh on 18     Liver lesion 2018     Liver transplanted (H) 2018     donor liver transplant     Portal vein thrombosis     on path explant     Postoperative atrial fibrillation (H) 2018     Prostate cancer (H)      TB lung, latent     Treated     PastSurgical History   has a past surgical history that includes appendectomy; colonoscopy; Transplant liver recipient  donor (N/A, 2018); Herniorrhaphy inguinal (2018); Endoscopic retrograde cholangiopancreatogram (N/A, 2018); IR Liver Biopsy Percutaneous (2018); Endoscopic retrograde cholangiopancreatogram (2019); Endoscopic retrograde cholangiopancreatogram (N/A, 2019); Laparotomy exploratory; Davinci prostatectomy (N/A, 2020); Coronary Angiogram (N/A, 10/13/2021); Instantaneous Wave-Free Ratio (N/A, 10/13/2021); Percutaneous Coronary " Intervention Stent (N/A, 10/13/2021); Intravascular Ultrasound (N/A, 10/13/2021); Percutaneous Coronary Intervention - Lithotripsy (N/A, 10/13/2021); Bronchoscopy (rigid or flexible), diagnostic (N/A, 03/05/2024); and picc insertion (Right, 03/06/2024).   Family History  Family History   Problem Relation Age of Onset     Memory loss Mother      Liver Disease Brother      Skin Cancer No family hx of      Melanoma No family hx of     Social History  He reports that he quit smoking about 6 years ago. His smoking use included cigarettes and cigars. He started smoking about 54 years ago. He has a 1.4 pack-year smoking history. He has never used smokeless tobacco. He reports that he does not drink alcohol and does not use drugs.   Notable Exposures Listed below if pertinent    Vaccination History:  Immunization History   Administered Date(s) Administered     COVID-19 12+ (2023-24) (Pfizer) 03/20/2024     COVID-19 Bivalent 12+ (Pfizer) 10/26/2022, 05/09/2023     COVID-19 Monovalent 18+ (Moderna) 03/01/2021, 03/30/2021, 09/21/2021     COVID-19 Monovalent Booster 18+ (Moderna) 04/26/2022     DTaP, Unspecified 08/21/2018     Influenza (High Dose) 3 valent vaccine 10/24/2018, 10/16/2019     Influenza (IIV3) PF 11/15/2000     Influenza Vaccine 65+ (Fluzone HD) 09/25/2020, 10/21/2021, 10/26/2022, 03/20/2024     Pneumo Conj 13-V (2010&after) 08/21/2018, 01/13/2020     Pneumococcal 23 valent 12/04/2018     RSV Vaccine (Arexvy) 04/23/2024     TD,PF 7+ (Tenivac) 11/15/2000     TDAP (Adacel,Boostrix) 08/21/2018     TDAP Vaccine (Boostrix) 08/21/2018     Zoster recombinant adjuvanted (SHINGRIX) 08/21/2018, 10/24/2018             Physical Exam:     VITAL SIGNS:  There were no vitals taken for this visit.     GENERAL APPEARANCE: Not in acute distress. Lying in bed.     PHYSICAL EXAM:   Eyes:     no ptosis, no discharge, no scleral icterus  Mouth, Throat:     mucous membranes moist  Cardiovascular: Appears well-perfused.   Respiratory:   No wheezes/crackles noted   Skin:     Dry and intact   No rashes  Neurologic:     Higher Mental Function: Conversant, AOx4   Facial asymmetry grossly absent   is ambulatory  Psychiatric:     Appropriate           Laboratory Data:   Metabolic Studies       Recent Labs   Lab Test 06/11/24  1456 05/21/24  1633 05/02/24  1348 04/16/24  1011 04/16/24  0527 04/15/24  0959 04/15/24  0439 04/14/24  1253 04/14/24  1134 04/14/24  0840 09/08/23  2201 09/08/23  1253    137 135   < > 130*  --  135  --  133*  --    < > 142   POTASSIUM 4.9 4.6 5.1   < > 4.0  --  4.2  --  4.2  --    < > 6.0*   CHLORIDE 104 105 102   < > 99  --  101  --  103  --    < > 107   CO2 26 24 23   < > 21*  --  22  --  19*  --    < > 25   ANIONGAP 7 8 10   < > 10  --  12  --  11  --    < > 10   BUN 20.1 24.2* 17.2   < > 17.3  --  13.5  --  15.9  --    < > 23.9*   CR 0.91 1.26* 0.97   < > 0.96  --  0.94  --  0.89  --    < > 0.98   GFRESTIMATED 90 61 84   < > 85  --  87  --  >90  --    < > 83   * 260* 404*   < > 285*   < > 149*   < > 304*  --    < > 217*   A1C  --   --   --   --  8.4*  --   --   --   --   --    < >  --    YELENA 9.1 8.7* 8.7*   < > 8.2*  --  8.7*  --  8.6*  --    < > 9.3   PHOS  --   --   --   --   --   --   --   --   --   --   --  3.4   MAG  --   --  1.7  --   --   --   --   --   --   --    < >  --    LACT  --   --   --   --   --   --  1.6  --   --  0.9   < >  --    PCAL  --   --   --   --   --   --   --   --  30.80*  --    < >  --    FGTL  --   --   --   --   --   --   --   --  <31  --    < >  --     < > = values in this interval not displayed.       Hepatic Studies    Recent Labs   Lab Test 06/11/24  1456 05/21/24  1633 05/02/24  1348 03/20/24  1409 03/08/24  0526 03/05/24  0554 03/04/24  0626   BILITOTAL 0.5 0.4 0.6   < > 0.3   < > 0.3   DBIL  --   --   --   --  <0.20   < > <0.20   ALKPHOS 172* 221* 209*   < > 249*   < > 308*   PROTTOTAL 6.8 6.6 6.6   < > 5.5*   < > 5.5*   ALBUMIN 3.7 3.5 3.4*   < > 2.6*   < > 2.4*   AST 18 18 15    < > 18   < > 31   ALT 16 24 18   < > 19   < > 30   LDH  --   --   --   --   --   --  179    < > = values in this interval not displayed.       Hematology Studies      Recent Labs   Lab Test 06/11/24  1456 05/21/24  1633 05/02/24  1348 04/17/24  0607 04/16/24  0527 04/15/24  0439 07/21/21  1804 07/07/21  1059 03/12/19  0815 03/04/19  1218   WBC 7.4 7.4 6.6 5.2 4.7 6.0   < > 7.0   < > 3.6*   ANEU  --   --   --   --   --   --   --  4.6  --  2.3   ALYM  --   --   --   --   --   --   --  1.8  --  1.0   KM  --   --   --   --   --   --   --  0.4  --  0.2   AEOS  --   --   --   --   --   --   --  0.2  --  0.1   HGB 13.3 12.2* 12.0* 12.4* 12.4* 13.8   < > 14.0   < > 11.2*   HCT 40.9 37.9* 37.4* 37.2* 37.3* 42.8   < > 40.1   < > 34.4*    285 287 188 166 167   < > 208   < > 199    < > = values in this interval not displayed.       Arterial Blood Gas Testing    Recent Labs   Lab Test 04/12/24 2131 12/23/18  0404 12/22/18 2126 12/22/18 2010 12/22/18  1914 12/22/18  1700   PH  --  7.41 7.43 7.41 7.42 7.36   PCO2  --  41 41 42 39 41   PO2  --  71* 92 221* 247* 184*   HCO3  --  26 27 26 25 23   O2PER 21 40 40 75 75 75        Medication levels    Recent Labs   Lab Test 04/15/24  1541 03/23/22  0720   VANCOMYCIN 10.5  --    TACROL  --  <1.0*       Absolute CD4, Shelby T Cells   Date Value Ref Range Status   06/11/2024 289 (L) 441 - 2,156 cells/uL Final       Inflammatory Markers    Recent Labs   Lab Test 07/24/24  1425   PSA <0.01       Immune Globulin Studies     Recent Labs   Lab Test 04/12/24  2117 03/24/21  0812   IGG 1,143 1,064   IGM  --  39   IGA  --  324       Pancreatitis testing    Recent Labs   Lab Test 04/13/24  0029 07/19/23  1411 07/10/19  0909 03/04/19  1218 02/18/19  0742 12/23/18  0404 12/22/18  0013   AMYLASE  --   --   --  44 38 46 67   LIPASE 19  --   --  42* 35* 93  --    TRIG  --  170*   < >  --   --   --   --     < > = values in this interval not displayed.       Gout Labs    No lab results  "found.    Clotting Studies    Recent Labs   Lab Test 04/13/24  0557 04/12/24  2117 02/29/24  1004 02/29/24  1004 03/11/23  0434   INR 1.21* 1.07  --  1.08 0.92   PTT  --  26   < > 43*  --     < > = values in this interval not displayed.         Markers    Recent Labs   Lab Test 03/07/22  1331 09/03/21  1757 02/01/21  0723 09/25/20  0820 07/03/20  1109 03/04/19  1218 10/03/18  0923 09/18/18  1524 08/21/18  1050 07/19/18  1132 06/18/18  0747 05/29/18  0754   FETO <1.5 <1.5 1.7 <1.5 <1.5 2.5 5.9 4.4 4.5 4.9 3.8 4.0       Autoimmune Testing  No lab results found.    Invalid input(s): \"PRO3\", \"C3\", \"C4\", \"VASPAN\"    Thyroid Studies     Recent Labs   Lab Test 05/02/24  1348 04/13/24  0029 03/02/24  0751 09/08/23  1253 06/14/23  1556   TSH 7.75* 0.32 5.47* 2.17 4.38*   T4 0.96  --  1.24  --  1.18       Urine Studies     Recent Labs   Lab Test 04/12/24  2134 03/20/24  1413 02/28/24  2035 09/08/23  2306 07/19/23  1427   URINEPH 6.0 5.5 5.5 5.5 6.0   NITRITE Negative Negative Negative Negative Negative   LEUKEST Small* Small* Small* Negative Negative   WBCU 79* 40* 49* 1 1       CSF testing   No lab results found.    Invalid input(s): \"CADAM\", \"EVPCR\", \"ENTPCR\", \"ENTEROVIRUS\"    Body fluid stats    Recent Labs   Lab Test 03/05/24  1131 12/25/18  0719   FCOL Colorless  --    FAPR Clear  --    FWBC 125  --    FNEU 6  --    FLYM 7  --    FMONO 87  --    GS  --  >10 Squamous epithelial cells/low power field indicates oral contamination. Please   recollect.  *  <25 PMNs/low power field  Moderate  Mixed gram positive luc         Microbiology:  Fungal testing  Recent Labs   Lab Test 06/11/24  1456 04/14/24  1134 03/05/24  1131 03/04/24  1129   FGTL  --  <31  --  <31   ASPGAI 0.06  --  0.14 0.05   ASPAG  --   --  Negative  --    ASPGAA Negative  --   --  Negative   COFUNG  --   --   --  <1:2       Culture   Date Value Ref Range Status   08/06/2024   Final    >10 Squamous epithelial cells/low power field indicates oral " "contamination. Please recollect.   04/14/2024 No Growth  Final   04/14/2024 No Growth  Final   04/12/2024 >100,000 CFU/mL Mixture of Urogenital Luc  Final   04/12/2024 No Growth  Final   04/12/2024 No Growth  Final   03/20/2024 10,000-50,000 CFU/mL Escherichia coli ESBL (A)  Final   03/05/2024 No Legionella species isolated  Final   03/05/2024 No Growth  Final   03/05/2024 No Growth  Final   03/05/2024 1+ Normal luc  Final   02/29/2024 3+ Normal luc  Final   02/28/2024 No Growth  Final   02/28/2024 No Growth  Final   02/28/2024 50,000-100,000 CFU/mL Escherichia coli ESBL (A)  Final   03/11/2023 >100,000 CFU/mL Escherichia coli (A)  Final     GS Culture   Date Value Ref Range Status   07/23/2024 See corresponding culture for results  Final   06/11/2024 See corresponding culture for results  Final     Culture Micro   Date Value Ref Range Status   02/18/2020   Final    <10,000 colonies/mL  urogenital luc  Susceptibility testing not routinely done     12/16/2019 <10,000 colonies/mL  urogenital luc    Final   12/16/2019 Susceptibility testing not routinely done  Final   10/10/2019 No growth  Final   12/25/2018 (A)  Final    Canceled, Test credited  >10 Squamous epithelial cells/low power field indicates oral contamination. Please   recollect.     12/25/2018   Final    Notification of test cancellation was given to  Tahira Reis RN at 1000 12.25.18     12/24/2018 No growth  Final   12/24/2018 No growth  Final   12/22/2018 No anaerobes isolated  Final   12/22/2018 No growth  Final   12/22/2018 Culture negative after 30 days  Final   12/22/2018 No VRE isolated  Final   (    Last check of C difficile  No results found for: \"CDBPCT\"    Infection Studies to assess Diarrhea No lab results found.    Virology:  Coronavirus-19 testing    Recent Labs   Lab Test 04/12/24  2330 02/28/24  2039 10/11/21  0806 10/01/21  1141   [id number] Negative Negative Negative Negative       Respiratory virus testing    Recent Labs   Lab " Test 06/11/24  1406 04/12/24  2330 03/05/24  1131   IFLUA Not Detected Not Detected Not Detected   INFZA  --  Negative  --    FLUAH1 Not Detected Not Detected Not Detected   FZ5576 Not Detected Not Detected Not Detected   FLUAH3 Not Detected Not Detected Not Detected   IFLUB Not Detected Not Detected Not Detected   INFZB  --  Negative  --    PIV1 Not Detected Not Detected Not Detected   PIV2 Not Detected Not Detected Not Detected   PIV3 Not Detected Not Detected Not Detected   PIV4 Not Detected Not Detected Not Detected   IRSV  --  Negative  --    RSVA Not Detected Not Detected Not Detected   RSVB Not Detected Not Detected Not Detected   HMPV Not Detected Not Detected Not Detected   ADENOV Not Detected Not Detected Not Detected   CORONA Not Detected Not Detected Not Detected       CMV Antibody IgG   Date Value Ref Range Status   12/22/2018 >8.0 (H) 0.0 - 0.8 AI Final     Comment:     Positive  Antibody index (AI) values reflect qualitative changes in antibody   concentration that cannot be directly associated with clinical condition or   disease state.     07/19/2018 >8.0 (H) 0.0 - 0.8 AI Final     Comment:     Positive  Antibody index (AI) values reflect qualitative changes in antibody   concentration that cannot be directly associated with clinical condition or   disease state.       CMV Antibody IgM   Date Value Ref Range Status   12/22/2018 <0.2 0.0 - 0.8 AI Final     Comment:     Negative  Antibody index (AI) values reflect qualitative changes in antibody   concentration that cannot be directly associated with clinical condition or   disease state.       EBV Capsid Antibody IgG   Date Value Ref Range Status   12/22/2018 >8.0 (H) 0.0 - 0.8 AI Final     Comment:     Positive, suggests recent or past exposure  Antibody index (AI) values reflect qualitative changes in antibody   concentration that cannot be directly associated with clinical condition or   disease state.     07/19/2018 7.7 (H) 0.0 - 0.8 AI Final      "Comment:     Positive, suggests recent or past exposure  Antibody index (AI) values reflect qualitative changes in antibody   concentration that cannot be directly associated with clinical condition or   disease state.       Toxoplasma Antibody IgG   Date Value Ref Range Status   09/18/2018 <3.0 0.0 - 7.1 IU/mL Final     Comment:     Negative- Absence of detectable Toxoplasma gondii IgG antibodies. A negative   result does not rule out acute infection.  The magnitude of the measured result is not indicative of the amount of   antibody present. The concentrations of anti-Toxoplasma gondii IgG in a given   specimen determined with assays from different manufacturers can vary due to   differences in assay methods and reagent specificity.       EBV Capsid Antibody IgM   Date Value Ref Range Status   12/22/2018 <0.2 0.0 - 0.8 AI Final     Comment:     No detectable antibody.  Antibody index (AI) values reflect qualitative changes in antibody   concentration that cannot be directly associated with clinical condition or   disease state.         CMV viral loads    Recent Labs   Lab Test 04/14/24  1256   CMVQNT Not Detected       CMV resistance testing  No lab results found.    No results found for: \"H6RES\"    No results found for: \"EBRES\"    No results found for: \"71484\", \"BKRES\"    Parvovirus Testing  No lab results found.    Invalid input(s): \"PRVRES\"    Adenovirus Testing  No lab results found.    Invalid input(s): \"ADENAB\", \"ADENOVIRUS\", \"ADQT\"    Hepatitis B Testing     Recent Labs   Lab Test 03/20/24  1409 12/22/18  0013   AUSAB  --  67.96*   HBCAB  --  Reactive*   HEPBANG Nonreactive  --         Hepatitis C Antibody   Date Value Ref Range Status   12/22/2018 Nonreactive NR^Nonreactive Final     Comment:     Assay performance characteristics have not been established for newborns,   infants, and children         Imaging:  No results found for this or any previous visit (from the past 48 hour(s)).           Again, " thank you for allowing me to participate in the care of your patient.      Sincerely,    Alessandra Capone MD

## 2024-08-13 NOTE — PATIENT INSTRUCTIONS
Thank you for coming to see us today. We talked about the following:     We will get some extra blood tests today to see if you have any evidence of inflammation or infection.   We will have you follow-up with the lung doctors to decide next steps in evaluation  We will have you schedule a CT chest in the next few weeks to see what your lungs currently look like.   We will have you get a vaccine today.     Follow-up with me in 4-5 weeks to continue discussing your lungs and how things are giong.

## 2024-08-13 NOTE — NURSING NOTE
"Chief Complaint   Patient presents with    RECHECK     Follow Up     BP (!) 182/87   Pulse 89   Temp 98.3  F (36.8  C) (Oral)   Ht 1.702 m (5' 7\")   Wt 89.1 kg (196 lb 6.4 oz)   SpO2 95%   BMI 30.76 kg/m    Aishwarya Romeo on 8/13/2024 at 1:47 PM    "

## 2024-08-14 LAB
ANA SER QL IF: NEGATIVE
ANCA AB PATTERN SER IF-IMP: NORMAL
C-ANCA TITR SER IF: NORMAL {TITER}
CCP AB SER IA-ACNC: 0.6 U/ML
IGE SERPL-ACNC: 85 KU/L (ref 0–114)

## 2024-08-15 ENCOUNTER — TELEPHONE (OUTPATIENT)
Dept: TRANSPLANT | Facility: CLINIC | Age: 69
End: 2024-08-15
Payer: COMMERCIAL

## 2024-08-15 DIAGNOSIS — Z94.4 LIVER REPLACED BY TRANSPLANT (H): Primary | ICD-10-CM

## 2024-08-15 LAB
ACID FAST STAIN (ARUP): NORMAL
ACID FAST STAIN (ARUP): NORMAL
DEPRECATED MISC ALLERGEN IGE RAST QL: NORMAL

## 2024-08-15 RX ORDER — MYCOPHENOLATE MOFETIL 250 MG/1
500 CAPSULE ORAL EVERY 12 HOURS
COMMUNITY
Start: 2024-08-15 | End: 2024-09-20 | Stop reason: ALTCHOICE

## 2024-08-15 RX ORDER — MYCOPHENOLATE MOFETIL 500 MG/1
500 TABLET ORAL 2 TIMES DAILY
Qty: 180 TABLET | Refills: 3 | Status: SHIPPED | OUTPATIENT
Start: 2024-08-15

## 2024-08-15 NOTE — TELEPHONE ENCOUNTER
Patient Call: Medication Refill  Route to LPN  Instruct the patient to first contact their pharmacy. If they have called their pharmacy and require further assistance, route to LPN.    Pharmacy Name: [facility] ([facility] #[id number]  Pharmacy Location: [address] [place] [address]  Name of Medication: mycophenolate  Dose: 500 MG table  When will the patient be out of this medication?: Less than 24 hours ([facility] then page if no answer)

## 2024-08-15 NOTE — TELEPHONE ENCOUNTER
General  Route to LPN    Reason for call:Pts daughter said the wrong dose was called in for her dad     Call back needed? Yes    Return Call Needed  Same as documented in contacts section  When to return call?: Same day: Route High Priority

## 2024-08-15 NOTE — TELEPHONE ENCOUNTER
Pharmacist calls to state that pt has been getting mycophenolate caps but not tables. Informed pharmacist OK to switch to capsules.

## 2024-08-18 LAB
A FUMIGATUS IGE QN: <0.1 KU/L
A FUMIGATUS1 AB SER QL ID: NORMAL
A FUMIGATUS6 AB SER QL ID: NORMAL
IGE SERPL-ACNC: 90 KU/L

## 2024-08-19 ASSESSMENT — ENCOUNTER SYMPTOMS
WHEEZING: 1
FEVER: 0
COUGH: 1
CHILLS: 0
WEIGHT LOSS: 0
SHORTNESS OF BREATH: 1
SPUTUM PRODUCTION: 1
HEMOPTYSIS: 0

## 2024-08-23 NOTE — PROGRESS NOTES
08/23/24 2:00 PM  PATIENT LAB/IMAGING STATUS : No pending lab orders   Patient is showing 3/5 MNCM met. BP out range     Yoselyn Hilliard, CMA

## 2024-08-27 ENCOUNTER — ANCILLARY PROCEDURE (OUTPATIENT)
Dept: CT IMAGING | Facility: CLINIC | Age: 69
End: 2024-08-27
Attending: INTERNAL MEDICINE
Payer: COMMERCIAL

## 2024-08-27 DIAGNOSIS — R91.8 PULMONARY INFILTRATES: ICD-10-CM

## 2024-08-27 DIAGNOSIS — Z94.2 LUNG REPLACED BY TRANSPLANT (H): ICD-10-CM

## 2024-08-27 PROCEDURE — 71250 CT THORAX DX C-: CPT | Performed by: RADIOLOGY

## 2024-09-18 LAB
ACID FAST STAIN (ARUP): NORMAL

## 2024-09-19 NOTE — PROGRESS NOTES
Endocrinology and Diabetes Clinic Return Visit    Subjective:   Loy Limon was seen today for a follow up visit for post-transplant diabetes mellitus and hypothyroidism. A  over the phone assisted with this visit.     Interval history:   Feels bloated, this is an ongoing symptom since transplant surgery.   He reports that he stopped taking levothyroxine, he didn't think he needed this medication and he is feeling fine without it.     Post transplant diabetes mellitus  He has history of cirrhosis with HCC and underwent living donor liver transplant on 12/22/2018. He developed steroid/immunosuppressant induced diabetes and was treated with NPH insulin, which was discontinued once he was no longer taking steroids.    Current treatment strategy:   Glargine 20 units at bedtime - not taking regularly (for example, if he is feeling good or if blood glucose is at goal, he would skip).   Metformin 1000 mg twice daily   Jardiance 10 mg daily - stopped when he was admitted with UTI in 3/2024    Up to urinate 6 times overnight, would not want to add back Jardiance right now   Tried Trulicity in the past - did not like this medication.     Prednisone dose is 5 mg daily     Blood glucose monitoring:   He checks finger stick blood glucose. However he ran out of test strips about 2 months ago so has not been checking.     Diabetes care and complications:  Retinopathy: None. Due for eye exam.   Nephropathy: CKD with +urine albumin.   Neuropathy: Yes, peripheral neuropathy with numbness/paraesthesia balls of feet   BP: Borderline control   Lipids: Statin prescribed     Medications:   Current Outpatient Medications   Medication Sig Dispense Refill    Alcohol Swabs PADS Use to swab the area of the injection or fam as directed Per insurance coverage 200 each 1    apixaban ANTICOAGULANT (ELIQUIS) 5 MG tablet Take 1 tablet (5 mg) by mouth 2 times daily 60 tablet 1    blood glucose (NO BRAND SPECIFIED) lancets  standard Use to test blood sugar 2 times daily or as directed. 200 each 3    blood glucose (NO BRAND SPECIFIED) lancets standard Use to test blood sugar 3 times daily or as directed.Please dispense insurance covered brand 300 Lancet 3    blood glucose (NO BRAND SPECIFIED) lancets standard To use to test glucose level in the blood Use to test blood sugar  3  times daily as directed. To accompany glucose monitor brands per insurance coverage. 200 each 1    blood glucose (NO BRAND SPECIFIED) test strip Use to test blood sugar 200 times daily as directed. 200 strip 3    blood glucose (NO BRAND SPECIFIED) test strip Use to test blood sugar 3 times daily or as directed. Please dispense insurance covered brand 300 strip 3    blood glucose (NO BRAND SPECIFIED) test strip To use to test glucose level in the blood Use to test blood sugar  3 times daily as directed. To accompany glucose monitor brands per insurance coverage. 100 strip 3    blood glucose (ONE TOUCH SURESOFT) lancing device Lancing device to be used with lancets. 1 each 0    blood glucose monitoring (NO BRAND SPECIFIED) meter device kit Use to test blood sugar 2 times daily or as directed. 1 kit 0    blood glucose monitoring (NO BRAND SPECIFIED) meter device kit Use to test blood sugar 3 times daily or as directed. Please dispense insurance covered brand 1 kit 0    blood glucose monitoring (NO BRAND SPECIFIED) meter device kit Use as directed Per insurance coverage 1 kit 0    blood glucose monitoring (ONE TOUCH ULTRA 2) meter device kit Use to test blood sugar 2 times daily or as directed. 1 kit 0    blood glucose monitoring (ONE TOUCH ULTRASOFT) lancets Use to test blood sugar 2 times daily. 100 each 6    diltiazem ER COATED BEADS (CARDIZEM CD/CARTIA XT) 240 MG 24 hr capsule Take 1 capsule (240 mg) by mouth daily 90 capsule 1    doxycycline hyclate (VIBRAMYCIN) 100 MG capsule Take 1 capsule (100 mg) by mouth 2 times daily 20 capsule 0    insulin glargine  (LANTUS SOLOSTAR) 100 UNIT/ML pen Inject 20 Units Subcutaneous at bedtime 15 mL 1    insulin pen needle (31G X 5 MM) 31G X 5 MM miscellaneous Use one  pen needles daily or as directed. 100 each 3    metFORMIN (GLUCOPHAGE XR) 500 MG 24 hr tablet Take 1,000 mg by mouth 2 times daily (with meals)      mycophenolate (GENERIC EQUIVALENT) 500 MG tablet Take 1 tablet (500 mg) by mouth 2 times daily 180 tablet 3    predniSONE (DELTASONE) 5 MG tablet Take 1 tablet (5 mg) by mouth daily 90 tablet 1    ursodiol (ACTIGALL) 250 MG tablet Take 1 tablet (250 mg) by mouth 2 times daily 180 tablet 3    amoxicillin-clavulanate (AUGMENTIN) 875-125 MG tablet Take 1 tablet by mouth 2 times daily (Patient not taking: Reported on 2024) 20 tablet 0    atorvastatin (LIPITOR) 40 MG tablet Take 1 tablet (40 mg) by mouth daily (Patient not taking: Reported on 2024) 90 tablet 3    hydrALAZINE (APRESOLINE) 25 MG tablet Take 1 tablet (25 mg) by mouth 2 times daily (Patient not taking: Reported on 2024) 180 tablet 3    insulin glargine (LANTUS PEN) 100 UNIT/ML pen Inject 28 units subcutaneous at hs. (Patient not taking: Reported on 2024) 20 mL 3    levothyroxine (SYNTHROID/LEVOTHROID) 150 MCG tablet Take 1 tablet (150 mcg) by mouth daily (Patient not taking: Reported on 2024) 90 tablet 3    methocarbamol (ROBAXIN) 500 MG tablet Take 500 mg by mouth 4 times daily as needed for muscle spasms (Patient not taking: Reported on 2024)      Vitamin D3 (CHOLECALCIFEROL) 25 mcg (1000 units) tablet Take 2 tablets (50 mcg) by mouth daily (Patient not taking: Reported on 2024) 180 tablet 3       Social History     Tobacco Use    Smoking status: Former     Current packs/day: 0.00     Average packs/day: (1.4 ttl pk-yrs)     Types: Cigarettes, Cigars     Start date: 1970     Quit date: 3/14/2018     Years since quittin.5    Smokeless tobacco: Never    Tobacco comments:     Quit smoking 2018, one cigarette after  dinner   Substance Use Topics    Alcohol use: No     Comment: Quit drinking Feb 2018       Physical Examination:  Wt Readings from Last 4 Encounters:   08/13/24 89.1 kg (196 lb 6.4 oz)   07/24/24 88.9 kg (196 lb)   07/23/24 88.9 kg (196 lb)   07/02/24 89 kg (196 lb 3.2 oz)     BP Readings from Last 3 Encounters:   09/20/24 (!) 140/81   08/13/24 (!) 166/91   08/06/24 (!) 164/94     Blood pressure (!) 140/81, pulse 56.    General: Well appearing and in no distress.   Eyes: EOMI. Sclerae and conjunctivae are clear.    HENT: No thyromegaly or mass.    Lymphatic: No cervical lymphadenopathy.  Cardiovascular: RRR, with normal S1+S2 and no murmurs.   Respiratory: Lungs are clear to auscultation.   Gastrointestinal: Abdomen is soft, non tender, and non-distended.   Extremities: Mild peripheral edema. Feet are warm and well perfused. No ulcers.   Neurologic: Sensation to monofilament is reduced in the feet bilaterally.     Labs and Studies:   Lab Results   Component Value Date    A1C 10.6 (H) 09/20/2024    A1C 8.4 (H) 04/16/2024    A1C 9.2 (H) 02/06/2024    A1C 8.8 (H) 07/19/2023    A1C 9.0 (H) 01/27/2023    A1C 11.1 (H) 10/21/2022    A1C 6.7 (H) 07/27/2022    A1C 9.1 (H) 07/07/2021    A1C 10.4 (H) 03/24/2021    A1C 7.2 (H) 01/03/2020    A1C 7.3 (H) 09/04/2019    A1C 8.5 (H) 05/23/2019    HEMOGLOBINA1 8.1 10/13/2023    HEMOGLOBINA1 12.1 (A) 02/12/2021     TSH   Date Value Ref Range Status   09/20/2024 51.29 (H) 0.30 - 4.20 uIU/mL Final   07/27/2022 11.01 (H) 0.40 - 4.00 mU/L Final   03/24/2021 9.86 (H) 0.40 - 4.00 mU/L Final     Creatinine   Date Value Ref Range Status   09/20/2024 1.23 (H) 0.67 - 1.17 mg/dL Final   07/07/2021 0.79 0.66 - 1.25 mg/dL Final       Assessment:  Post transplant diabetes mellitus.    A. Mild peripheral neuropathy, managed with gabapentin. (He reports numbness in toes since prior to the diabetes diagnosis).    B. Elevated urine albumin and reduced eGFR. He saw nephrology in the past, has been  referred to their clinic again by his PCP.   2. Hypothyroidism treated with levothyroxine. Not currently taking this medication, and TSH is high.   3. S/p liver transplant.   4. Prostate cancer s/p prostatectomy.   5. Hypertension. Unclear to me which medications for BP he is currently taking   6. CAD s/p stent.     Plan:   - Continue current medications for diabetes - glargine 20 units daily and metformin  - He should take glargine every day, we discussed the importance of taking this even when blood sugars are at goal   - He was concerned about hypoglycemia, this occurred once in the past with symptoms and is a reason he is skipping glargine (when blood sugars are not high). Risk of hypoglycemia is also a reason not to increase glargine until we have blood sugar data with the current dose.   - Refill for blood glucose testing supplies provided   - He would be likely to benefit from use of a CGM. Referral to CDE for education   - Restart levothyroxine   - There was discrepancy between his medication list and reported medications today. Referral to [facility] pharmacy to reconcile med list.   - Labs today.     Follow up in 3 months with me or with PA.     44 minutes spent on the date of the encounter doing chart review, history and exam, documentation and further activities per the note.     Sue Tavares MD  Endocrinology and Diabetes   Office: [phone]   Clinic: [phone]

## 2024-09-20 ENCOUNTER — OFFICE VISIT (OUTPATIENT)
Dept: ENDOCRINOLOGY | Facility: CLINIC | Age: 69
End: 2024-09-20
Payer: COMMERCIAL

## 2024-09-20 ENCOUNTER — LAB (OUTPATIENT)
Dept: LAB | Facility: CLINIC | Age: 69
End: 2024-09-20
Payer: COMMERCIAL

## 2024-09-20 VITALS — HEART RATE: 56 BPM | DIASTOLIC BLOOD PRESSURE: 81 MMHG | SYSTOLIC BLOOD PRESSURE: 140 MMHG

## 2024-09-20 DIAGNOSIS — Z94.4 LIVER TRANSPLANT RECIPIENT (H): Chronic | ICD-10-CM

## 2024-09-20 DIAGNOSIS — Z79.4 TYPE 2 DIABETES MELLITUS WITH OTHER SPECIFIED COMPLICATION, WITH LONG-TERM CURRENT USE OF INSULIN (H): Primary | ICD-10-CM

## 2024-09-20 DIAGNOSIS — E11.69 TYPE 2 DIABETES MELLITUS WITH OTHER SPECIFIED COMPLICATION, WITH LONG-TERM CURRENT USE OF INSULIN (H): Primary | ICD-10-CM

## 2024-09-20 DIAGNOSIS — E03.8 OTHER SPECIFIED HYPOTHYROIDISM: ICD-10-CM

## 2024-09-20 DIAGNOSIS — E11.69 TYPE 2 DIABETES MELLITUS WITH OTHER SPECIFIED COMPLICATION, WITH LONG-TERM CURRENT USE OF INSULIN (H): ICD-10-CM

## 2024-09-20 DIAGNOSIS — Z79.4 TYPE 2 DIABETES MELLITUS WITH OTHER SPECIFIED COMPLICATION, WITH LONG-TERM CURRENT USE OF INSULIN (H): ICD-10-CM

## 2024-09-20 LAB
ANION GAP SERPL CALCULATED.3IONS-SCNC: 10 MMOL/L (ref 7–15)
BUN SERPL-MCNC: 24 MG/DL (ref 8–23)
CALCIUM SERPL-MCNC: 8.8 MG/DL (ref 8.8–10.4)
CHLORIDE SERPL-SCNC: 103 MMOL/L (ref 98–107)
CREAT SERPL-MCNC: 1.23 MG/DL (ref 0.67–1.17)
CREAT UR-MCNC: 213 MG/DL
EGFRCR SERPLBLD CKD-EPI 2021: 63 ML/MIN/1.73M2
EST. AVERAGE GLUCOSE BLD GHB EST-MCNC: 258 MG/DL
GLUCOSE SERPL-MCNC: 208 MG/DL (ref 70–99)
HBA1C MFR BLD: 10.6 %
HCO3 SERPL-SCNC: 25 MMOL/L (ref 22–29)
MICROALBUMIN UR-MCNC: >4400 MG/L
MICROALBUMIN/CREAT UR: NORMAL MG/G{CREAT}
POTASSIUM SERPL-SCNC: 4.9 MMOL/L (ref 3.4–5.3)
SODIUM SERPL-SCNC: 138 MMOL/L (ref 135–145)
T4 FREE SERPL-MCNC: 0.54 NG/DL (ref 0.9–1.7)
TSH SERPL DL<=0.005 MIU/L-ACNC: 51.29 UIU/ML (ref 0.3–4.2)
VIT B12 SERPL-MCNC: 276 PG/ML (ref 232–1245)

## 2024-09-20 PROCEDURE — 83036 HEMOGLOBIN GLYCOSYLATED A1C: CPT | Performed by: PATHOLOGY

## 2024-09-20 PROCEDURE — 84443 ASSAY THYROID STIM HORMONE: CPT | Performed by: PATHOLOGY

## 2024-09-20 PROCEDURE — 99000 SPECIMEN HANDLING OFFICE-LAB: CPT | Performed by: PATHOLOGY

## 2024-09-20 PROCEDURE — 82607 VITAMIN B-12: CPT | Performed by: INTERNAL MEDICINE

## 2024-09-20 PROCEDURE — 36415 COLL VENOUS BLD VENIPUNCTURE: CPT | Performed by: PATHOLOGY

## 2024-09-20 PROCEDURE — 99215 OFFICE O/P EST HI 40 MIN: CPT | Performed by: INTERNAL MEDICINE

## 2024-09-20 PROCEDURE — 84439 ASSAY OF FREE THYROXINE: CPT | Performed by: PATHOLOGY

## 2024-09-20 PROCEDURE — 80048 BASIC METABOLIC PNL TOTAL CA: CPT | Performed by: PATHOLOGY

## 2024-09-20 PROCEDURE — 82043 UR ALBUMIN QUANTITATIVE: CPT | Performed by: INTERNAL MEDICINE

## 2024-09-20 NOTE — LETTER
9/20/2024       RE: Loy Limon  [address]  [facility] [address]     Dear Colleague,    Thank you for referring your patient, Loy Limon, to the [facility] [place] [facility]. Please see a copy of my visit note below.    08/23/24 2:00 PM  PATIENT LAB/IMAGING STATUS : No pending lab orders   Patient is showing 3/5 MNCM met. BP out range     Yoselyn Hilliard CMA      Endocrinology and Diabetes Clinic Return Visit    Subjective:   Loy Limon was seen today for a follow up visit for post-transplant diabetes mellitus and hypothyroidism. A  over the phone assisted with this visit.     Interval history:   Feels bloated, this is an ongoing symptom since transplant surgery.   He reports that he stopped taking levothyroxine, he didn't think he needed this medication and he is feeling fine without it.     Post transplant diabetes mellitus  He has history of cirrhosis with HCC and underwent living donor liver transplant on 12/22/2018. He developed steroid/immunosuppressant induced diabetes and was treated with NPH insulin, which was discontinued once he was no longer taking steroids.    Current treatment strategy:   Glargine 20 units at bedtime - not taking regularly (for example, if he is feeling good or if blood glucose is at goal, he would skip).   Metformin 1000 mg twice daily   Jardiance 10 mg daily - stopped when he was admitted with UTI in 3/2024    Up to urinate 6 times overnight, would not want to add back Jardiance right now   Tried Trulicity in the past - did not like this medication.     Prednisone dose is 5 mg daily     Blood glucose monitoring:   He checks finger stick blood glucose. However he ran out of test strips about 2 months ago so has not been checking.     Diabetes care and complications:  Retinopathy: None. Due for eye exam.   Nephropathy: CKD with +urine albumin.    Neuropathy: Yes, peripheral neuropathy with numbness/paraesthesia balls of feet   BP: Borderline control   Lipids: Statin prescribed     Medications:   Current Outpatient Medications   Medication Sig Dispense Refill     Alcohol Swabs PADS Use to swab the area of the injection or fam as directed Per insurance coverage 200 each 1     apixaban ANTICOAGULANT (ELIQUIS) 5 MG tablet Take 1 tablet (5 mg) by mouth 2 times daily 60 tablet 1     blood glucose (NO BRAND SPECIFIED) lancets standard Use to test blood sugar 2 times daily or as directed. 200 each 3     blood glucose (NO BRAND SPECIFIED) lancets standard Use to test blood sugar 3 times daily or as directed.Please dispense insurance covered brand 300 Lancet 3     blood glucose (NO BRAND SPECIFIED) lancets standard To use to test glucose level in the blood Use to test blood sugar  3  times daily as directed. To accompany glucose monitor brands per insurance coverage. 200 each 1     blood glucose (NO BRAND SPECIFIED) test strip Use to test blood sugar 200 times daily as directed. 200 strip 3     blood glucose (NO BRAND SPECIFIED) test strip Use to test blood sugar 3 times daily or as directed. Please dispense insurance covered brand 300 strip 3     blood glucose (NO BRAND SPECIFIED) test strip To use to test glucose level in the blood Use to test blood sugar  3 times daily as directed. To accompany glucose monitor brands per insurance coverage. 100 strip 3     blood glucose (ONE TOUCH SURESOFT) lancing device Lancing device to be used with lancets. 1 each 0     blood glucose monitoring (NO BRAND SPECIFIED) meter device kit Use to test blood sugar 2 times daily or as directed. 1 kit 0     blood glucose monitoring (NO BRAND SPECIFIED) meter device kit Use to test blood sugar 3 times daily or as directed. Please dispense insurance covered brand 1 kit 0     blood glucose monitoring (NO BRAND SPECIFIED) meter device kit Use as directed Per insurance coverage 1 kit 0      blood glucose monitoring (ONE TOUCH ULTRA 2) meter device kit Use to test blood sugar 2 times daily or as directed. 1 kit 0     blood glucose monitoring (ONE TOUCH ULTRASOFT) lancets Use to test blood sugar 2 times daily. 100 each 6     diltiazem ER COATED BEADS (CARDIZEM CD/CARTIA XT) 240 MG 24 hr capsule Take 1 capsule (240 mg) by mouth daily 90 capsule 1     doxycycline hyclate (VIBRAMYCIN) 100 MG capsule Take 1 capsule (100 mg) by mouth 2 times daily 20 capsule 0     insulin glargine (LANTUS SOLOSTAR) 100 UNIT/ML pen Inject 20 Units Subcutaneous at bedtime 15 mL 1     insulin pen needle (31G X 5 MM) 31G X 5 MM miscellaneous Use one  pen needles daily or as directed. 100 each 3     metFORMIN (GLUCOPHAGE XR) 500 MG 24 hr tablet Take 1,000 mg by mouth 2 times daily (with meals)       mycophenolate (GENERIC EQUIVALENT) 500 MG tablet Take 1 tablet (500 mg) by mouth 2 times daily 180 tablet 3     predniSONE (DELTASONE) 5 MG tablet Take 1 tablet (5 mg) by mouth daily 90 tablet 1     ursodiol (ACTIGALL) 250 MG tablet Take 1 tablet (250 mg) by mouth 2 times daily 180 tablet 3     amoxicillin-clavulanate (AUGMENTIN) 875-125 MG tablet Take 1 tablet by mouth 2 times daily (Patient not taking: Reported on 9/20/2024) 20 tablet 0     atorvastatin (LIPITOR) 40 MG tablet Take 1 tablet (40 mg) by mouth daily (Patient not taking: Reported on 9/20/2024) 90 tablet 3     hydrALAZINE (APRESOLINE) 25 MG tablet Take 1 tablet (25 mg) by mouth 2 times daily (Patient not taking: Reported on 9/20/2024) 180 tablet 3     insulin glargine (LANTUS PEN) 100 UNIT/ML pen Inject 28 units subcutaneous at hs. (Patient not taking: Reported on 9/20/2024) 20 mL 3     levothyroxine (SYNTHROID/LEVOTHROID) 150 MCG tablet Take 1 tablet (150 mcg) by mouth daily (Patient not taking: Reported on 9/20/2024) 90 tablet 3     methocarbamol (ROBAXIN) 500 MG tablet Take 500 mg by mouth 4 times daily as needed for muscle spasms (Patient not taking: Reported on  2024)       Vitamin D3 (CHOLECALCIFEROL) 25 mcg (1000 units) tablet Take 2 tablets (50 mcg) by mouth daily (Patient not taking: Reported on 2024) 180 tablet 3       Social History     Tobacco Use     Smoking status: Former     Current packs/day: 0.00     Average packs/day: (1.4 ttl pk-yrs)     Types: Cigarettes, Cigars     Start date: 1970     Quit date: 3/14/2018     Years since quittin.5     Smokeless tobacco: Never     Tobacco comments:     Quit smoking 2018, one cigarette after dinner   Substance Use Topics     Alcohol use: No     Comment: Quit drinking 2018       Physical Examination:  Wt Readings from Last 4 Encounters:   24 89.1 kg (196 lb 6.4 oz)   24 88.9 kg (196 lb)   24 88.9 kg (196 lb)   24 89 kg (196 lb 3.2 oz)     BP Readings from Last 3 Encounters:   24 (!) 140/81   24 (!) 166/91   24 (!) 164/94     Blood pressure (!) 140/81, pulse 56.    General: Well appearing and in no distress.   Eyes: EOMI. Sclerae and conjunctivae are clear.    HENT: No thyromegaly or mass.    Lymphatic: No cervical lymphadenopathy.  Cardiovascular: RRR, with normal S1+S2 and no murmurs.   Respiratory: Lungs are clear to auscultation.   Gastrointestinal: Abdomen is soft, non tender, and non-distended.   Extremities: Mild peripheral edema. Feet are warm and well perfused. No ulcers.   Neurologic: Sensation to monofilament is reduced in the feet bilaterally.     Labs and Studies:   Lab Results   Component Value Date    A1C 10.6 (H) 2024    A1C 8.4 (H) 2024    A1C 9.2 (H) 2024    A1C 8.8 (H) 2023    A1C 9.0 (H) 2023    A1C 11.1 (H) 10/21/2022    A1C 6.7 (H) 2022    A1C 9.1 (H) 2021    A1C 10.4 (H) 2021    A1C 7.2 (H) 2020    A1C 7.3 (H) 2019    A1C 8.5 (H) 2019    HEMOGLOBINA1 8.1 10/13/2023    HEMOGLOBINA1 12.1 (A) 2021     TSH   Date Value Ref Range Status   2024 51.29 (H) 0.30 - 4.20  uIU/mL Final   07/27/2022 11.01 (H) 0.40 - 4.00 mU/L Final   03/24/2021 9.86 (H) 0.40 - 4.00 mU/L Final     Creatinine   Date Value Ref Range Status   09/20/2024 1.23 (H) 0.67 - 1.17 mg/dL Final   07/07/2021 0.79 0.66 - 1.25 mg/dL Final       Assessment:  Post transplant diabetes mellitus.    A. Mild peripheral neuropathy, managed with gabapentin. (He reports numbness in toes since prior to the diabetes diagnosis).    B. Elevated urine albumin and reduced eGFR. He saw nephrology in the past, has been referred to their clinic again by his PCP.   2. Hypothyroidism treated with levothyroxine. Not currently taking this medication, and TSH is high.   3. S/p liver transplant.   4. Prostate cancer s/p prostatectomy.   5. Hypertension. Unclear to me which medications for BP he is currently taking   6. CAD s/p stent.     Plan:   - Continue current medications for diabetes - glargine 20 units daily and metformin  - He should take glargine every day, we discussed the importance of taking this even when blood sugars are at goal   - He was concerned about hypoglycemia, this occurred once in the past with symptoms and is a reason he is skipping glargine (when blood sugars are not high). Risk of hypoglycemia is also a reason not to increase glargine until we have blood sugar data with the current dose.   - Refill for blood glucose testing supplies provided   - He would be likely to benefit from use of a CGM. Referral to CDE for education   - Restart levothyroxine   - There was discrepancy between his medication list and reported medications today. Referral to [facility] pharmacy to reconcile med list.   - Labs today.     Follow up in 3 months with me or with PA.     44 minutes spent on the date of the encounter doing chart review, history and exam, documentation and further activities per the note.     Sue Tavares MD  Endocrinology and Diabetes   Office: [phone]   Clinic: [phone]            Again, thank you for allowing me to  participate in the care of your patient.      Sincerely,    Sue Tavares MD

## 2024-09-22 RX ORDER — LEVOTHYROXINE SODIUM 150 UG/1
150 TABLET ORAL DAILY
Qty: 90 TABLET | Refills: 3 | Status: SHIPPED | OUTPATIENT
Start: 2024-09-22 | End: 2024-09-22

## 2024-09-22 RX ORDER — LEVOTHYROXINE SODIUM 150 UG/1
150 TABLET ORAL DAILY
Qty: 90 TABLET | Refills: 3 | Status: SHIPPED | OUTPATIENT
Start: 2024-09-22

## 2024-09-27 DIAGNOSIS — E11.69 TYPE 2 DIABETES MELLITUS WITH OTHER SPECIFIED COMPLICATION, WITH LONG-TERM CURRENT USE OF INSULIN (H): Primary | ICD-10-CM

## 2024-09-27 DIAGNOSIS — Z79.4 TYPE 2 DIABETES MELLITUS WITH OTHER SPECIFIED COMPLICATION, WITH LONG-TERM CURRENT USE OF INSULIN (H): Primary | ICD-10-CM

## 2024-09-27 DIAGNOSIS — E03.9 HYPOTHYROIDISM, UNSPECIFIED TYPE: ICD-10-CM

## 2024-09-27 NOTE — PATIENT INSTRUCTIONS
Schedule and appointment with Dr. Casiano 8/19/19 at 11:30, Dr. Cerda, and follow up with Dr. Torres.    It was a pleasure meeting with you today.  Thank you for allowing me and my team the privilege of caring for you today.  YOU are the reason we are here, and I truly hope we provided you with the excellent service you deserve.  Please let us know if there is anything else we can do for you so that we can be sure you are leaving completely satisfied with your care experience.           Unstructured MSE

## 2024-10-01 ENCOUNTER — TELEPHONE (OUTPATIENT)
Dept: ENDOCRINOLOGY | Facility: CLINIC | Age: 69
End: 2024-10-01
Payer: COMMERCIAL

## 2024-10-02 DIAGNOSIS — E11.69 TYPE 2 DIABETES MELLITUS WITH OTHER SPECIFIED COMPLICATION, WITH LONG-TERM CURRENT USE OF INSULIN (H): Primary | ICD-10-CM

## 2024-10-02 DIAGNOSIS — Z79.4 TYPE 2 DIABETES MELLITUS WITH OTHER SPECIFIED COMPLICATION, WITH LONG-TERM CURRENT USE OF INSULIN (H): Primary | ICD-10-CM

## 2024-10-02 DIAGNOSIS — Z94.4 LIVER TRANSPLANTED (H): ICD-10-CM

## 2024-10-03 ENCOUNTER — TELEPHONE (OUTPATIENT)
Dept: INFECTIOUS DISEASES | Facility: CLINIC | Age: 69
End: 2024-10-03
Payer: COMMERCIAL

## 2024-10-03 NOTE — TELEPHONE ENCOUNTER
EP LVM 10/3 to resched 9/24 follow up with Dr. Capone; pt no showed. Sched for next avail.     ----- Message from Alessandra Capone sent at 9/24/2024  3:03 PM CDT -----  Regarding: Rescheduling Appointment  Hello-    Can we schedule a make-up appointment for Mr. Limon since he missed his visit today?     Alessandra

## 2024-10-07 RX ORDER — METFORMIN HYDROCHLORIDE 500 MG/1
1000 TABLET, EXTENDED RELEASE ORAL 2 TIMES DAILY WITH MEALS
Qty: 360 TABLET | Refills: 3 | Status: SHIPPED | OUTPATIENT
Start: 2024-10-07

## 2024-10-07 RX ORDER — URSODIOL 250 MG/1
250 TABLET, FILM COATED ORAL 2 TIMES DAILY
Qty: 180 TABLET | Refills: 3 | OUTPATIENT
Start: 2024-10-07

## 2024-10-07 NOTE — TELEPHONE ENCOUNTER
metFORMIN (GLUCOPHAGE XR) 500 MG 24 hr tablet     Last Written Prescription Date:  ?  Last Fill Quantity: ?,   # refills: ?  Last Office Visit : 9/20/24  Future Office visit:  2/18/25    Routing refill request to provider for review/approval because:  Medication is reported/historical

## 2024-10-08 ENCOUNTER — OFFICE VISIT (OUTPATIENT)
Dept: PHARMACY | Facility: CLINIC | Age: 69
End: 2024-10-08
Attending: INTERNAL MEDICINE
Payer: COMMERCIAL

## 2024-10-08 VITALS — OXYGEN SATURATION: 97 % | HEART RATE: 61 BPM | SYSTOLIC BLOOD PRESSURE: 147 MMHG | DIASTOLIC BLOOD PRESSURE: 80 MMHG

## 2024-10-08 DIAGNOSIS — I10 PRIMARY HYPERTENSION: ICD-10-CM

## 2024-10-08 DIAGNOSIS — E03.9 HYPOTHYROIDISM, UNSPECIFIED TYPE: ICD-10-CM

## 2024-10-08 DIAGNOSIS — E11.69 TYPE 2 DIABETES MELLITUS WITH OTHER SPECIFIED COMPLICATION, WITH LONG-TERM CURRENT USE OF INSULIN (H): ICD-10-CM

## 2024-10-08 DIAGNOSIS — N18.2 CHRONIC KIDNEY DISEASE, STAGE 2 (MILD): ICD-10-CM

## 2024-10-08 DIAGNOSIS — Z79.4 TYPE 2 DIABETES MELLITUS WITH OTHER SPECIFIED COMPLICATION, WITH LONG-TERM CURRENT USE OF INSULIN (H): ICD-10-CM

## 2024-10-08 DIAGNOSIS — Z94.4 LIVER TRANSPLANT RECIPIENT (H): Chronic | ICD-10-CM

## 2024-10-08 DIAGNOSIS — Z78.9 TAKES DIETARY SUPPLEMENTS: ICD-10-CM

## 2024-10-08 DIAGNOSIS — I48.91 ATRIAL FIBRILLATION WITH RAPID VENTRICULAR RESPONSE (H): Primary | ICD-10-CM

## 2024-10-08 LAB
ACID FAST STAIN (ARUP): NORMAL

## 2024-10-08 PROCEDURE — 99607 MTMS BY PHARM ADDL 15 MIN: CPT

## 2024-10-08 PROCEDURE — 99605 MTMS BY PHARM NP 15 MIN: CPT

## 2024-10-08 NOTE — LETTER
October 11, 2024  Loy Limon  [address]  [place] [address]    Dear Mr. Limon, [facility]     Thank you for talking with me on Oct 8, 2024 about your health and medications. As a follow-up to our conversation, I have included two documents:      Your Recommended To-Do List has steps you should take to get the best results from your medications.  Your Medication List will help you keep track of your medications and how to take them.    If you want to talk about these documents, please call Nereyda Mendieta RPH at phone: [phone], Monday-Friday 8-4:30pm.    I look forward to working with you and your doctors to make sure your medications work well for you.    Sincerely,  Nereyda Mendieta RPH  [facility] Pharmacist, [facility]

## 2024-10-08 NOTE — LETTER
_  Medication List        Prepared on: Oct 8, 2024     Bring your Medication List when you go to the doctor, hospital, or   emergency room. And, share it with your family or caregivers.     Note any changes to how you take your medications.  Cross out medications when you no longer use them.    Medication How I take it Why I use it Prescriber   Alcohol Swabs PADS Use to swab the area of the injection or fam as directed Per insurance coverage Uncontrolled type 2 diabetes mellitus with hyperglycemia (H) Alessandra Zaragoza MD   apixaban ANTICOAGULANT (ELIQUIS) 5 MG tablet Take 1 tablet (5 mg) by mouth 2 times daily Tachycardia Colin Duarte MD   atorvastatin (LIPITOR) 40 MG tablet Take 1 tablet (40 mg) by mouth daily DM type 2, goal HbA1c 7%-8% (H) Barby Jiagn PA-C   blood glucose (NO BRAND SPECIFIED) lancets standard Use to test blood sugar 2 times daily or as directed. Type 2 diabetes mellitus with other specified complication, with long-term current use of insulin (H) Sue Tavares MD   blood glucose (NO BRAND SPECIFIED) test strip Use to test blood sugar 2 times daily as directed. Type 2 diabetes mellitus with other specified complication, with long-term current use of insulin (H) Sue Tavares MD   blood glucose monitoring (NO BRAND SPECIFIED) meter device kit Use to test blood sugar 2 times daily or as directed. Type 2 diabetes mellitus with other specified complication, with long-term current use of insulin (H) Sue Tavares MD   diltiazem ER COATED BEADS (CARDIZEM CD/CARTIA XT) 240 MG 24 hr capsule Take 1 capsule (240 mg) by mouth daily Atrial fibrillation with rapid ventricular response (H); Tachycardia Rolly Plascencia MD   hydrALAZINE (APRESOLINE) 25 MG tablet Take 1 tablet (25 mg) by mouth 2 times daily. Primary Hypertension Sue Tavares MD   insulin glargine (LANTUS SOLOSTAR) 100 UNIT/ML pen Inject 20 Units Subcutaneous at bedtime Type 2 diabetes  mellitus with other specified complication, with long-term current use of insulin (H) Colin Duarte MD   insulin pen needle (31G X 5 MM) 31G X 5 MM miscellaneous Use one  pen needles daily or as directed. Type 2 diabetes mellitus without complication, with long-term current use of insulin (H) Barby Jiang PA-C   levothyroxine (SYNTHROID/LEVOTHROID) 150 MCG tablet Take 1 tablet (150 mcg) by mouth daily. Other specified hypothyroidism Sue Tavares MD   metFORMIN (GLUCOPHAGE XR) 500 MG 24 hr tablet Take 2 tablets (1,000 mg) by mouth 2 times daily (with meals). Type 2 diabetes mellitus with other specified complication, with long-term current use of insulin (H) Sue Tavares MD   mycophenolate (GENERIC EQUIVALENT) 500 MG tablet Take 1 tablet (500 mg) by mouth 2 times daily Liver Replaced by Transplant (H) Izabela Galvan MD   predniSONE (DELTASONE) 5 MG tablet Take 1 tablet (5 mg) by mouth daily Liver Transplanted (H) Alessandra Zaragoza MD   ursodiol (ACTIGALL) 250 MG tablet Take 1 tablet (250 mg) by mouth 2 times daily Liver Transplanted (H) Alessandra Zaragoza MD   Vitamin D3 (CHOLECALCIFEROL) 25 mcg (1000 units) tablet Take 2 tablets (50 mcg) by mouth daily Vitamin D Deficiency Alessandra Zaragoza MD         Add new medications, over-the-counter drugs, herbals, vitamins, or  minerals in the blank rows below.    Medication How I take it Why I use it Prescriber                                      Allergies:      No Known Allergies        Side effects I have had:      No Known Side Effects        Other Information:              My notes and questions:

## 2024-10-08 NOTE — PROGRESS NOTES
Medication Therapy Management (MTM) Encounter    ASSESSMENT:                            Medication Adherence/Access: See below for considerations.    1. Type 2 Diabetes  Patient is not meeting A1c goal of < 8%  However, he's been taking Lantus daily since 9/20/24 (was previously skipping doses because he felt fine). Patient would benefit from monitoring his blood sugar at home. Attempted to trouble shoot glucometer in clinic today. Patient only had 1 test strip left, which did not provide a blood sugar reading. Sent new testing supplies to pharmacy, was advised it would be covered by insurance on 10/15.    2. Hypertension  Blood pressure is not at goal of <140/80 mmHg. Patient would benefit from resuming hydralazine, refills sent to pharmacy.     3. Atrial Fibrillation with RVR  Eliquis was originally prescribed by hospitalist and patient is out of refills. Will discuss who will take over prescribing with PCP and cardiologist.    4. Hypothyroidism  TSH is significantly elevated, patient would benefit from restarting levothyroxine and rechecking TSH 6-8 weeks later (will order lab at follow up).    5. Liver Transplant (2018)  Patient would benefit from taking ursodiol twice daily (as prescribed).    6. Vitamin D Deficiency  Stable.    7. CKD (CrCl 58.7 mL/min, GFR 63 mL/min/1.73m2)  Doses of medications are appropriate based on current kidney function.    PLAN:                            I sent prescriptions for new testing supplies to your [facility] pharmacy, they should be available on 10/15.  I sent refills of hydralazine (AM and PM, for blood pressure)  I asked PCP and cardiologist about sending refills of Eliquis (AM and PM, for atrial fibrillation to prevent clots)   levothyroxine (AM, for thyroid) from the pharmacy and start taking again  Take ursodiol twice daily (AM and PM)    Follow-up: in clinic on  Wednesday Nov 13, 2024   Appt at 2:30 PM (60 min)     SUBJECTIVE/OBJECTIVE:                           Loy Limon is a 69 year old male seen for an initial visit. He was referred to me from Dr. Sue Tavares (endocrinologist). Patient seen with .     Reason for visit: Med reconciliation.    Allergies/ADRs: Reviewed in chart  Past Medical History: Reviewed in chart  Tobacco: He reports that he quit smoking about 6 years ago. His smoking use included cigarettes and cigars. He started smoking about 54 years ago. He has a 1.4 pack-year smoking history. He has never used smokeless tobacco.  Alcohol: none    Medication Adherence/Access: no issues reported.    Diabetes (Steroid Induced) - Follows with Endocrinology  Metformin XR 1000 mg twice daily  Lantus 20 units at bedtime - has been taking consistently since last endo appointment (9/20/24)  Atorvastatin 40 mg daily  Patient is not experiencing side effects.  Current diabetes symptoms: polyuria  Diet/Exercise: Not discussed  Blood sugar monitoring: Has a glucometer, but it's not working. He brought it to today's visit to see if it can be fixed. Notes his fasting blood sugar should be less than 130 mg/dL. No symptoms of hypoglycemia.     Eye exam in the last 12 months? No  Foot exam: due    Medication Considerations:  Jardiance - stopped when he was admitted with UTI in 3/2024  Trulicity - tried in the past, did not like this medication    Lab Results   Component Value Date    A1C 10.6 09/20/2024    A1C 8.4 04/16/2024    A1C 9.2 02/06/2024    A1C 8.8 07/19/2023    A1C 9.0 01/27/2023    A1C 11.1 10/21/2022    A1C 6.7 07/27/2022    A1C 9.1 07/07/2021    A1C 10.4 03/24/2021    A1C 7.2 01/03/2020    A1C 7.3 09/04/2019    A1C 8.5 05/23/2019     Hypertension   Hydralazine 25 mg twice daily - not taking, states he ran out of refills  Patient reports no current medication side effects  Patient does not self-monitor blood pressure.     Medication Considerations:  Losartan stopped due to hyperkalemia    BP Readings from Last 6 Encounters:   10/08/24 (!)  147/80   09/20/24 (!) 140/81   08/13/24 (!) 166/91   08/06/24 (!) 164/94   07/24/24 (!) 142/83   07/23/24 (!) 159/90      Afib with RVR - Follows with Cardiology  Eliquis 5 mg twice daily - not taking, states he ran out of refills  Diltiazem  mg daily  No other reported issues at this time.      Hypothyroidism   Levothyroxine 150 mcg daily - didn't know this was for thyroid, not taking  Patient is having the following symptoms: hypothyroidism -  constipation.     TSH   Date Value Ref Range Status   09/20/2024 51.29 (H) 0.30 - 4.20 uIU/mL Final   07/27/2022 11.01 (H) 0.40 - 4.00 mU/L Final   03/24/2021 9.86 (H) 0.40 - 4.00 mU/L Final      Liver Transplant (2018)  Mycophenolate 500 mg twice daily  Prednisone 5 mg daily with food  Ursodiol 250 mg twice daily - has been taking once daily  Taking prednisone in the afternoon, hasn't noticed an impact on sleep.  No reported issues at this time.     Vitamin D Deficiency  Vitamin D 2000 units daily  No reported issues at this time.     Today's Vitals: BP (!) 147/80   Pulse 61   SpO2 97%   ----------------    I spent 60 minutes with this patient today. All changes were made via collaborative practice agreement with Jaswinder Anne MD and Sue Tavares MD. A copy of the visit note was provided to the patient's provider(s).    A summary of these recommendations was given to the patient.    Nereyda Mendieta, PharmD  Medication Therapy Management Pharmacist      Medication Therapy Recommendations  Atrial fibrillation with rapid ventricular response (H)    Current Medication: apixaban ANTICOAGULANT (ELIQUIS) 5 MG tablet   Rationale: Medication product not available - Adherence - Adherence   Recommendation: Provide Adherence Intervention   Status: Contact Provider - Awaiting Response         Diabetes mellitus, type 2 (H)    Current Medication: blood glucose monitoring (NO BRAND SPECIFIED) meter device kit (Discontinued)   Rationale: Medication product not available -  Adherence - Adherence   Recommendation: Change Medication   Status: Accepted per CPA   Note: Needs new meter         Essential hypertension    Current Medication: hydrALAZINE (APRESOLINE) 25 MG tablet   Rationale: Medication product not available - Adherence - Adherence   Recommendation: Provide Adherence Intervention   Status: Accepted per CPA   Note: Hydralazine refills sent         Hypothyroidism, unspecified type    Current Medication: levothyroxine (SYNTHROID/LEVOTHROID) 150 MCG tablet   Rationale: Patient forgets to take - Adherence - Adherence   Recommendation: Provide Adherence Intervention   Status: Accepted - no CPA Needed         Liver transplant recipient (H)    Current Medication: ursodiol (ACTIGALL) 250 MG tablet   Rationale: Does not understand instructions - Adherence - Adherence   Recommendation: Provide Adherence Intervention   Status: Accepted - no CPA Needed

## 2024-10-08 NOTE — LETTER
"Recommended To-Do List      Prepared on: Oct 8, 2024       You can get the best results from your medications by completing the items on this \"To-Do List.\"      Bring your To-Do List when you go to your doctor. And, share it with your family or caregivers.    My To-Do List:  What we talked about: What I should do:   The importance of taking your medication as intended    Reminder to take your medication as prescribed for apixaban ANTICOAGULANT (ELIQUIS)        What we talked about: What I should do:   The importance of taking your medication as intended    Change the medication you are taking from blood glucose monitoring (NEW GLUCOMETER)        What we talked about: What I should do:   The importance of taking your medication as intended    Reminder to take your medication as prescribed for hydrALAZINE (APRESOLINE)        What we talked about: What I should do:   The importance of taking your medication as intended    Reminder to take your medication as prescribed for levothyroxine (SYNTHROID/LEVOTHROID)        What we talked about: What I should do:   The importance of taking your medication as intended    Reminder to take your medication as prescribed for ursodiol (ACTIGALL)        "

## 2024-10-08 NOTE — PATIENT INSTRUCTIONS
"Recommendations from today's MTM visit:                                                    MTM (medication therapy management) is a service provided by a clinical pharmacist designed to help you get the most of out of your medicines.   Today we reviewed what your medicines are for, how to know if they are working, that your medicines are safe and how to make your medicine regimen as easy as possible.      I sent prescriptions for new testing supplies to the pharmacy here.  Take ursodiol twice daily (AM and PM)  I will ask Dr. Duarte about sending refills of Eliquis (AM and PM, for atrial fibrillation to prevent clots)  I will ask Dr. Tavares about sending refills of hydralazine (AM and PM, for blood pressure)   levothyroxine (AM, for thyroid) from the pharmacy and start taking again    Follow-up: in clinic on  Wednesday Nov 13, 2024   Appt at 2:30 PM (60 min)     It was great speaking with you today.  I value your experience and would be very thankful for your time in providing feedback in our clinic survey. In the next few days, you may receive an email or text message from [employer] with a link to a survey related to your  clinical pharmacist.\"     To schedule another MTM appointment, please call the clinic directly or you may call the MTM scheduling line at [phone] or toll-free at [phone].     My Clinical Pharmacist's contact information:                                                      Please feel free to contact me with any questions or concerns you have.    Nereyda Mendieta, PharmD  Medication Therapy Management Pharmacist    "

## 2024-10-11 RX ORDER — HYDRALAZINE HYDROCHLORIDE 25 MG/1
25 TABLET, FILM COATED ORAL 2 TIMES DAILY
Qty: 180 TABLET | Refills: 3 | Status: SHIPPED | OUTPATIENT
Start: 2024-10-11

## 2024-10-18 ENCOUNTER — APPOINTMENT (OUTPATIENT)
Dept: INTERPRETER SERVICES | Facility: CLINIC | Age: 69
End: 2024-10-18
Payer: COMMERCIAL

## 2024-10-22 ENCOUNTER — TELEPHONE (OUTPATIENT)
Dept: ENDOCRINOLOGY | Facility: CLINIC | Age: 69
End: 2024-10-22

## 2024-10-30 ENCOUNTER — OFFICE VISIT (OUTPATIENT)
Dept: PULMONOLOGY | Facility: CLINIC | Age: 69
End: 2024-10-30
Attending: INTERNAL MEDICINE
Payer: COMMERCIAL

## 2024-10-30 VITALS
DIASTOLIC BLOOD PRESSURE: 67 MMHG | WEIGHT: 197.3 LBS | SYSTOLIC BLOOD PRESSURE: 141 MMHG | BODY MASS INDEX: 30.9 KG/M2 | TEMPERATURE: 98 F | HEART RATE: 72 BPM | OXYGEN SATURATION: 97 %

## 2024-10-30 DIAGNOSIS — R05.3 CHRONIC COUGH: ICD-10-CM

## 2024-10-30 DIAGNOSIS — R05.9 COUGH, UNSPECIFIED TYPE: Primary | ICD-10-CM

## 2024-10-30 PROCEDURE — G0463 HOSPITAL OUTPT CLINIC VISIT: HCPCS | Performed by: INTERNAL MEDICINE

## 2024-10-30 PROCEDURE — 99214 OFFICE O/P EST MOD 30 MIN: CPT | Performed by: INTERNAL MEDICINE

## 2024-10-30 RX ORDER — FLUTICASONE PROPIONATE 50 MCG
1 SPRAY, SUSPENSION (ML) NASAL DAILY
Qty: 16 G | Refills: 5 | Status: SHIPPED | OUTPATIENT
Start: 2024-10-30

## 2024-10-30 RX ORDER — ALBUTEROL SULFATE 90 UG/1
2 INHALANT RESPIRATORY (INHALATION) EVERY 6 HOURS PRN
Qty: 18 G | Refills: 11 | Status: SHIPPED | OUTPATIENT
Start: 2024-10-30

## 2024-10-30 ASSESSMENT — PAIN SCALES - GENERAL: PAINLEVEL_OUTOF10: NO PAIN (0)

## 2024-10-30 NOTE — LETTER
10/30/2024      Loy Limon  [address]  [facility] [address]      Dear Colleague,    Thank you for referring your patient, Loy Limon, to the [facility]. Please see a copy of my visit note below.      [facility]  [address] [facility] [address] [address]  Phone: [phone]  Fax: [phone]          Pulmonology Clinic Follow up Visit       Loy Limon MRN# [id number]   YOB: 1955 Age: 69 year old     Date of Visit: 10/30/2024    Reason for Visit: Follow-up pulmonary opacities          Assessment and Plan:     ## Chronic productive cough  ## Pulmonary opacities - resolved  ## History of liver transplant  ## History of latent TB SP treatment  ## History of tobacco use     22 packyr smoking history, quit in 2018 at the time of his liver transplant. History of latent TB s/p treatment. Having resp issues since April 2024. Multiple rounds of antibiotics including Levaquin, doxy, azithro, Augmentin, and ertapenem. Multiple sputum cultures including induced sputum all with oral contamination but with negative AFB. Bronch March 2024 without abnormality. Imaging  with diffuse GGO that slowly resolved. Notable resolution of the opacities on imaging, most recently 8/27/2024.  However there was some mild subpleural fibrosis on the right.  Also mild pulmonary artery enlargement suspicious for pulmonary artery hypertension.    Echo April 2024 unable to assess pulmonary artery pressure     PFT's from 2018 WNL, PFT's from August with possible restriction but patient had difficulty with the maneuvers so unclear if this is reliable     IgE and ABPA panel neg so aspergillus less likely. HIV negative. DEYSI, ANCA, CCP, RF negative so autoimmune less likely. CRP and ESR now WNL. CBC without leukocytosis, EOS WNL.     Currently with chronic productive cough,  primarily at night and post nasal drainage. No dyspnea or wheezing. No allergy Sx. No GERD.    - Begin albuterol and flonase      - Education provided on the use of both today        Return to clinic: 3 months        Chaitanya Earl M.D.  Pulmonary & Critical Care  Pager: Click Here to page          History of Present Illness / Interval History:     Loy Limon is a 69 year old male with H/O anemia, BPH, hepatocellular carcinoma status post liver transplant in 2018, latent TB status posttreatment who presents to discuss chronic cough    Last seen: 8/6/2024 when he established care      Has some productive cough with chest wall discomfort. Has been better over last few days. Mild dyspnea when coughing but otherwise fine.    Primarily having phlegm at night that he has to sit up to clear out of his lungs/throat. Occurs about 5 times every night. Feels that he has a lot of mucous draining down from his sinuses. No rhinorrhea or sinus congestion. No No sneezing or itchy eyes. Rarely has cough during the day.    Much better than when he was last seen.    No edema. No GERD.            Review of Systems:     Review of Systems   Respiratory:  Positive for cough and shortness of breath. Negative for wheezing.             Physical Examination:     BP (!) 141/67   Pulse 72   Temp 98  F (36.7  C) (Oral)   Wt 89.5 kg (197 lb 4.8 oz)   SpO2 97%   BMI 30.90 kg/m      Physical Exam  Vitals and nursing note reviewed.   Constitutional:       Appearance: Normal appearance.   Cardiovascular:      Rate and Rhythm: Normal rate and regular rhythm.   Pulmonary:      Effort: Pulmonary effort is normal.      Breath sounds: Normal breath sounds.   Neurological:      Mental Status: He is alert.       Fingernails without clubbing        Data:       CT chest 8/27/2024  Resolution of previous groundglass and consolidative  opacities on the left. Mild subpleural fibrosis on the right  unchanged. No new nodules. Grossly unchanged  small right pleural  effusion with associated mild basilar atelectasis. Extensive  atherosclerosis including coronary artery disease. Liver transplant  with cholecystectomy. Mild pulmonary artery enlargement which may  indicate pulmonary hypertension. Mitral annular calcifications which  may indicate valvular disorder with left atrial enlargement.      CT chest 5/31/2024  1. Left upper lobe predominant peribronchial vascular groundglass and  consolidative opacities likely representative of an ongoing  infectious/inflammatory process/organizing pneumonia.  2. Improved appearance of left lower lobe predominant groundglass  opacities which are nearly resolved.  3. Redemonstration of mild pulmonary artery enlargement. Correlate  clinically for pulmonary hypertension.    Echo 4/12/2024  Resolution of previous groundglass and consolidative  opacities on the left. Mild subpleural fibrosis on the right  unchanged. No new nodules. Grossly unchanged small right pleural  effusion with associated mild basilar atelectasis. Extensive  atherosclerosis including coronary artery disease. Liver transplant  with cholecystectomy. Mild pulmonary artery enlargement which may  indicate pulmonary hypertension. Mitral annular calcifications which  may indicate valvular disorder with left atrial enlargement.      Labs from last visit:  IgE and ABPA panel neg so aspergillus less likely. HIV negative. DEYSI, ANCA, CCP, RF negative so autoimmune less likely. CRP and ESR now WNL. CBC without leukocytosis, EOS WNL.     All studies listed here were independently reviewed and interpreted by me today.         Medications:     Current Outpatient Medications   Medication Sig Dispense Refill     Alcohol Swabs PADS Use to swab the area of the injection or fam as directed Per insurance coverage 200 each 1     apixaban ANTICOAGULANT (ELIQUIS) 5 MG tablet Take 1 tablet (5 mg) by mouth 2 times daily 60 tablet 1     atorvastatin (LIPITOR) 40 MG tablet Take 1  tablet (40 mg) by mouth daily 90 tablet 3     blood glucose (NO BRAND SPECIFIED) lancets standard Use to test blood sugar 2 times daily or as directed. 200 each 3     blood glucose (NO BRAND SPECIFIED) test strip Use to test blood sugar 2 times daily as directed. 200 strip 3     blood glucose monitoring (NO BRAND SPECIFIED) meter device kit Use to test blood sugar 2 times daily or as directed. 1 kit 0     diltiazem ER COATED BEADS (CARDIZEM CD/CARTIA XT) 240 MG 24 hr capsule Take 1 capsule (240 mg) by mouth daily 90 capsule 1     hydrALAZINE (APRESOLINE) 25 MG tablet Take 1 tablet (25 mg) by mouth 2 times daily. 180 tablet 3     insulin glargine (LANTUS SOLOSTAR) 100 UNIT/ML pen Inject 20 Units Subcutaneous at bedtime 15 mL 1     insulin pen needle (31G X 5 MM) 31G X 5 MM miscellaneous Use one  pen needles daily or as directed. 100 each 3     levothyroxine (SYNTHROID/LEVOTHROID) 150 MCG tablet Take 1 tablet (150 mcg) by mouth daily. 90 tablet 3     metFORMIN (GLUCOPHAGE XR) 500 MG 24 hr tablet Take 2 tablets (1,000 mg) by mouth 2 times daily (with meals). 360 tablet 3     mycophenolate (GENERIC EQUIVALENT) 500 MG tablet Take 1 tablet (500 mg) by mouth 2 times daily 180 tablet 3     predniSONE (DELTASONE) 5 MG tablet Take 1 tablet (5 mg) by mouth daily 90 tablet 1     ursodiol (ACTIGALL) 250 MG tablet Take 1 tablet (250 mg) by mouth 2 times daily 180 tablet 3     Vitamin D3 (CHOLECALCIFEROL) 25 mcg (1000 units) tablet Take 2 tablets (50 mcg) by mouth daily 180 tablet 3     No current facility-administered medications for this visit.             The above note was dictated using voice recognition software and may include typographical errors. Please contact the author for any clarifications.        Again, thank you for allowing me to participate in the care of your patient.        Sincerely,        Chaitanya Earl MD

## 2024-10-30 NOTE — PROGRESS NOTES
[facility] [address] [address]  Phone: [phone]  Fax: [phone]          Pulmonology Clinic Follow up Visit       Loy Limon MRN# [id number]   YOB: 1955 Age: 69 year old     Date of Visit: 10/30/2024    Reason for Visit: Follow-up pulmonary opacities          Assessment and Plan:     ## Chronic productive cough  ## Pulmonary opacities - resolved  ## History of liver transplant  ## History of latent TB SP treatment  ## History of tobacco use     22 packyr smoking history, quit in 2018 at the time of his liver transplant. History of latent TB s/p treatment. Having resp issues since April 2024. Multiple rounds of antibiotics including Levaquin, doxy, azithro, Augmentin, and ertapenem. Multiple sputum cultures including induced sputum all with oral contamination but with negative AFB. Bronch March 2024 without abnormality. Imaging  with diffuse GGO that slowly resolved. Notable resolution of the opacities on imaging, most recently 8/27/2024.  However there was some mild subpleural fibrosis on the right.  Also mild pulmonary artery enlargement suspicious for pulmonary artery hypertension.    Echo April 2024 unable to assess pulmonary artery pressure     PFT's from 2018 WNL, PFT's from August with possible restriction but patient had difficulty with the maneuvers so unclear if this is reliable     IgE and ABPA panel neg so aspergillus less likely. HIV negative. DEYSI, ANCA, CCP, RF negative so autoimmune less likely. CRP and ESR now WNL. CBC without leukocytosis, EOS WNL.     Currently with chronic productive cough, primarily at night and post nasal drainage. No dyspnea or wheezing. No allergy Sx. No GERD.    - Begin albuterol and flonase      - Education provided on the use of both today        Return to clinic: 3 months        Chaitanya Earl M.D.  Pulmonary & Critical Care  Pager: Click Here to  page          History of Present Illness / Interval History:     Loy Limon is a 69 year old male with H/O anemia, BPH, hepatocellular carcinoma status post liver transplant in 2018, latent TB status posttreatment who presents to discuss chronic cough    Last seen: 8/6/2024 when he established care    Todays visit conducted with the assistance of a professional  by video.    Has some productive cough with chest wall discomfort. Has been better over last few days. Mild dyspnea when coughing but otherwise fine.    Primarily having phlegm at night that he has to sit up to clear out of his lungs/throat. Occurs about 5 times every night. Feels that he has a lot of mucous draining down from his sinuses. No rhinorrhea or sinus congestion. No No sneezing or itchy eyes. Rarely has cough during the day.    Much better than when he was last seen.    No edema. No GERD.            Review of Systems:     Review of Systems   Respiratory:  Positive for cough and shortness of breath. Negative for wheezing.             Physical Examination:     BP (!) 141/67   Pulse 72   Temp 98  F (36.7  C) (Oral)   Wt 89.5 kg (197 lb 4.8 oz)   SpO2 97%   BMI 30.90 kg/m      Physical Exam  Vitals and nursing note reviewed.   Constitutional:       Appearance: Normal appearance.   Cardiovascular:      Rate and Rhythm: Normal rate and regular rhythm.   Pulmonary:      Effort: Pulmonary effort is normal.      Breath sounds: Normal breath sounds.   Neurological:      Mental Status: He is alert.       Fingernails without clubbing        Data:       CT chest 8/27/2024  Resolution of previous groundglass and consolidative  opacities on the left. Mild subpleural fibrosis on the right  unchanged. No new nodules. Grossly unchanged small right pleural  effusion with associated mild basilar atelectasis. Extensive  atherosclerosis including coronary artery disease. Liver transplant  with cholecystectomy. Mild pulmonary artery  enlargement which may  indicate pulmonary hypertension. Mitral annular calcifications which  may indicate valvular disorder with left atrial enlargement.      CT chest 5/31/2024  1. Left upper lobe predominant peribronchial vascular groundglass and  consolidative opacities likely representative of an ongoing  infectious/inflammatory process/organizing pneumonia.  2. Improved appearance of left lower lobe predominant groundglass  opacities which are nearly resolved.  3. Redemonstration of mild pulmonary artery enlargement. Correlate  clinically for pulmonary hypertension.    Echo 4/12/2024  Resolution of previous groundglass and consolidative  opacities on the left. Mild subpleural fibrosis on the right  unchanged. No new nodules. Grossly unchanged small right pleural  effusion with associated mild basilar atelectasis. Extensive  atherosclerosis including coronary artery disease. Liver transplant  with cholecystectomy. Mild pulmonary artery enlargement which may  indicate pulmonary hypertension. Mitral annular calcifications which  may indicate valvular disorder with left atrial enlargement.      Labs from last visit:  IgE and ABPA panel neg so aspergillus less likely. HIV negative. DEYSI, ANCA, CCP, RF negative so autoimmune less likely. CRP and ESR now WNL. CBC without leukocytosis, EOS WNL.     All studies listed here were independently reviewed and interpreted by me today.         Medications:     Current Outpatient Medications   Medication Sig Dispense Refill    Alcohol Swabs PADS Use to swab the area of the injection or fam as directed Per insurance coverage 200 each 1    apixaban ANTICOAGULANT (ELIQUIS) 5 MG tablet Take 1 tablet (5 mg) by mouth 2 times daily 60 tablet 1    atorvastatin (LIPITOR) 40 MG tablet Take 1 tablet (40 mg) by mouth daily 90 tablet 3    blood glucose (NO BRAND SPECIFIED) lancets standard Use to test blood sugar 2 times daily or as directed. 200 each 3    blood glucose (NO BRAND  SPECIFIED) test strip Use to test blood sugar 2 times daily as directed. 200 strip 3    blood glucose monitoring (NO BRAND SPECIFIED) meter device kit Use to test blood sugar 2 times daily or as directed. 1 kit 0    diltiazem ER COATED BEADS (CARDIZEM CD/CARTIA XT) 240 MG 24 hr capsule Take 1 capsule (240 mg) by mouth daily 90 capsule 1    hydrALAZINE (APRESOLINE) 25 MG tablet Take 1 tablet (25 mg) by mouth 2 times daily. 180 tablet 3    insulin glargine (LANTUS SOLOSTAR) 100 UNIT/ML pen Inject 20 Units Subcutaneous at bedtime 15 mL 1    insulin pen needle (31G X 5 MM) 31G X 5 MM miscellaneous Use one  pen needles daily or as directed. 100 each 3    levothyroxine (SYNTHROID/LEVOTHROID) 150 MCG tablet Take 1 tablet (150 mcg) by mouth daily. 90 tablet 3    metFORMIN (GLUCOPHAGE XR) 500 MG 24 hr tablet Take 2 tablets (1,000 mg) by mouth 2 times daily (with meals). 360 tablet 3    mycophenolate (GENERIC EQUIVALENT) 500 MG tablet Take 1 tablet (500 mg) by mouth 2 times daily 180 tablet 3    predniSONE (DELTASONE) 5 MG tablet Take 1 tablet (5 mg) by mouth daily 90 tablet 1    ursodiol (ACTIGALL) 250 MG tablet Take 1 tablet (250 mg) by mouth 2 times daily 180 tablet 3    Vitamin D3 (CHOLECALCIFEROL) 25 mcg (1000 units) tablet Take 2 tablets (50 mcg) by mouth daily 180 tablet 3     No current facility-administered medications for this visit.             The above note was dictated using voice recognition software and may include typographical errors. Please contact the author for any clarifications.

## 2024-10-30 NOTE — NURSING NOTE
Chief Complaint   Patient presents with    RECHECK     2 month follow up.Stated he still has a cough that will not go away. Stated he has phlem that is greenish white.      Vitals:    10/30/24 1414 10/30/24 1416   BP: (!) 152/80 (!) 141/67   BP Location: Left arm    Patient Position: Sitting    Cuff Size: Adult Regular    Pulse: 72    Temp: 98  F (36.7  C)    TempSrc: Oral    SpO2: 97%    Weight: 89.5 kg (197 lb 4.8 oz)        BP Readings from Last 3 Encounters:   10/30/24 (!) 141/67   10/08/24 (!) 147/80   09/20/24 (!) 140/81       BP (!) 141/67   Pulse 72   Temp 98  F (36.7  C) (Oral)   Wt 89.5 kg (197 lb 4.8 oz)   SpO2 97%   BMI 30.90 kg/m       Sunshine Heymans

## 2024-11-01 ASSESSMENT — ENCOUNTER SYMPTOMS
WHEEZING: 0
COUGH: 1
SHORTNESS OF BREATH: 1

## 2024-11-11 DIAGNOSIS — R00.0 TACHYCARDIA: ICD-10-CM

## 2024-11-11 DIAGNOSIS — I48.91 ATRIAL FIBRILLATION WITH RAPID VENTRICULAR RESPONSE (H): ICD-10-CM

## 2024-11-12 ENCOUNTER — TELEPHONE (OUTPATIENT)
Dept: CARDIOLOGY | Facility: CLINIC | Age: 69
End: 2024-11-12
Payer: COMMERCIAL

## 2024-11-12 ENCOUNTER — TELEPHONE (OUTPATIENT)
Dept: ENDOCRINOLOGY | Facility: CLINIC | Age: 69
End: 2024-11-12
Payer: COMMERCIAL

## 2024-11-12 DIAGNOSIS — E03.8 OTHER SPECIFIED HYPOTHYROIDISM: ICD-10-CM

## 2024-11-12 RX ORDER — LEVOTHYROXINE SODIUM 150 UG/1
150 TABLET ORAL DAILY
Qty: 90 TABLET | Refills: 3 | Status: SHIPPED | OUTPATIENT
Start: 2024-11-12

## 2024-11-12 NOTE — TELEPHONE ENCOUNTER
[facility] Call Center    Phone Message    May a detailed message be left on voicemail: yes    Reason for Call: Medication Question or concern regarding medication   Prescription Clarification  Name of Medication: diltiazem ER COATED BEADS (CARDIZEM CD/CARTIA XT) 240 MG 24 hr capsule   Prescribing Provider: Thais   Pharmacy: [facility] ([facility]) #[id number] - [place], [place] - [address]    What on the order needs clarification? Pharmacy called requesting to speak with a member of the patients care team. Is looking for clarification regarding the instructions. Please call back to further discuss.    Thank you!  [facility]

## 2024-11-12 NOTE — TELEPHONE ENCOUNTER
Confirmed with pharmacy that pt should be taking Diltiazem 240 mg once daily.     Sami Wylie, RN   Cardiology Nurse Coordinator

## 2024-11-12 NOTE — TELEPHONE ENCOUNTER
[facility] Call Center    Phone Message    May a detailed message be left on voicemail: yes     Reason for Call: Other: pharmacy wants to clarify directions on    levothyroxine (SYNTHROID/LEVOTHROID) 150 MCG tablet [57795] (Order [id number])  patient told pharmacy he's taking twice a day script stats once. Please call [phone].    Action Taken: Message routed to:  [facility] ([facility] endo    Travel Screening: Not Applicable     Date of Service:

## 2024-11-14 RX ORDER — DILTIAZEM HYDROCHLORIDE 240 MG/1
240 CAPSULE, COATED, EXTENDED RELEASE ORAL DAILY
Qty: 90 CAPSULE | Refills: 1 | Status: SHIPPED | OUTPATIENT
Start: 2024-11-14

## 2024-11-14 NOTE — TELEPHONE ENCOUNTER
diltiazem ER COATED BEADS (CARDIZEM CD/CARTIA XT) 240 MG 24 hr capsule       Last Written Prescription Date:  7/2/24  Last Fill Quantity: 90,   # refills: 1  Last Office Visit : 7/2/24  Future Office visit:  1/7/25    Routing refill request to provider for review/approval because:  Blood pressure out of range       BP Readings from Last 3 Encounters:   10/30/24 (!) 141/67   10/08/24 (!) 147/80   09/20/24 (!) 140/81

## 2024-11-21 NOTE — PROGRESS NOTES
[facility]  Transplant Infectious Disease Consultation note  Today's Date: 03/15/2019    Recommendations:  - continue INH with B6 till aroudn 4/9 to complete latent TB treatment   - LFT monitoring by the transplant team   - valcyte and bactrim prophylaxis duration per protocol    Thank you for involving me in the care of this patient. Please do not hesitate to contact me with any questions.    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 S/p liver transplant 12/22.     Latent TB: with a positive quant and being from [place] where TB incidence is high and in the absence of symptoms or signs of active TB, would consider him to have latent TB. Recommend rifampin for 4 months. No major drug interactions but due to liver cirrhosis, will follow LFTS closely. Discussed symptoms of liver toxicity and side effects of rifampin and answered all questions about the concept of latent Tb that patient had a little difficulty in understanding.    We started treatment 8/21. He is supposed to have been done 12/20 but had 1.5 months worth of meds remaining on 12/4. Rifampin was stopped 12/22 once he got the transplant. He brought in the remaining rifampin meds on 2/8 which amounted to 27 days worth of pills. I then started him on  mg daily with pyridoxine for 2 months total to complete latent TB treatment. He has 2-3 weeks remaining of the latent TB treatment with god tolerance of the medication.     - Serostatus: CMV R+, EBV R+  - Immunization status: up to date   - Prophylaxis: valcyte 900 mg daily, bactrim     Attestation: I have reviewed today's vital signs, medications, labs and imaging. Face to face time 25 mins. >50% spent coordinating care and counseling on my impressions and plan going forward.     Pauline Clancy  , [facility]  Pager -  [phone]    -------------------------------------------------------------------------------------------------------------------   Interval events: Last seen    services utilized  Since then it appears that he has been taking  gm daily with pyridoxine 50 mg daily. He is due to complete the 2 month course around .  He has also increased the dose of valcyte to 900 mg daily as recommended by me  He is tolerating it well. LFTs are at baseline. He feels fine except for numbness in RUQ. He feels he is 90% of normal. He is also able to do his pill box himeself with the nurse overlooking him.     Reason for consult / Chief complaint: 18  Consulted by Dr. Carballo for latent TB    History of presenting illness: Patient presents with   Loy Braxton is a 63 year old with medical history significant for cirrhosis of liver from alcohol use, HCC being evaluated for liver transplantation. As part of that work up, quantiferon was done and found to be positive at a value of around 1 and was thus referred to me.     He was born and raised in [place]. Came to the [place] around 35 years ago, goes back to [place] every year. He does not remember being exposed to anybody with TB. He does not recall having PPD or quant before. He reports, his wifes brother had tuberculosis, but he not closely associated with him. He denies symptoms of active TB. Ct chest done 2018 does not show evidence of active pulmn TB.     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     Social Hx:  Social History     Tobacco Use     Smoking status: Former Smoker     Packs/day: 0.03     Years: 20.00     Pack years: 0.60     Types: Cigarettes, Cigars     Start date: 1970     Last attempt to quit: 3/14/2018     Years since quittin.0     Smokeless tobacco: Never Used     Tobacco comment: Quit smoking 2018, one cigarette after dinner    Substance Use Topics     Alcohol use: No     Comment: Quit drinking 2018     Drug use: No       Immunizations:  Immunization History   Administered Date(s) Administered     Influenza (High Dose) 3 valent vaccine 10/24/2018     Pneumo Conj 13-V (2010&after) 2018     Pneumococcal 23 valent 2018     TDAP Vaccine (Boostrix) 2018     Zoster vaccine recombinant adjuvanted (SHINGRIX) 2018, 10/24/2018       Allergies:   No Known Allergies    Medications:  Current Outpatient Medications   Medication     alfuzosin ER (UROXATRAL) 10 MG 24 hr tablet     amLODIPine (NORVASC) 10 MG tablet     aspirin (ASA) 325 MG EC tablet     blood glucose (NO BRAND SPECIFIED) lancets standard     blood glucose (ONETOUCH VERIO IQ) test strip     glucose 40 % (400 mg/mL) GEL gel     insulin pen needle (32G X 4 MM) 32G X 4 MM miscellaneous     isoniazid (NYDRAZID) 300 MG tablet     magnesium citrate solution     metoprolol tartrate 75 MG TABS     multivitamin w/minerals (THERA-VIT-M) tablet     mycophenolate (GENERIC EQUIVALENT) 250 MG capsule     polyethylene glycol (MIRALAX/GLYCOLAX) packet     polyvinyl alcohol (LIQUIFILM TEARS) 1.4 % ophthalmic solution     Sharps Container MISC     sulfamethoxazole-trimethoprim (BACTRIM/SEPTRA) 400-80 MG tablet     tacrolimus (GENERIC EQUIVALENT) 1 MG capsule     urea (GORMEL) 20 % external cream     ursodiol (ACTIGALL) 250 MG tablet     valGANciclovir (VALCYTE) 450 MG tablet     vitamin B6 (PYRIDOXINE) 50 MG TABS     Current Facility-Administered Medications   Medication     aluminum chloride (DRYSOL) 20 % external solution       Past Medical Hx:  Past Medical History:   Diagnosis Date     Biliary stricture of transplanted liver (H) 2018     Cancer (H)     hepatocellualr carcinoma     Cirrhosis of liver (H) 2018     Enlarged prostate      Inguinal hernia     Repaired with mesh on 18     Liver lesion 2018     Liver transplanted (H) 2018     donor  liver transplant     Portal vein thrombosis     on path explant     Postoperative atrial fibrillation (H) 2018     Pre-diabetes 2018         Family History:  Family History   Problem Relation Age of Onset     Liver Disease Brother      Skin Cancer No family hx of      Melanoma No family hx of          Review of Systems:  10 systems reviewed, pertinent positives noted in my HPI.      Examination:  Vital signs:   /66   Pulse 50   Temp 97.8  F (36.6  C)   Wt 76.9 kg (169 lb 8 oz)   SpO2 97%   BMI 26.29 kg/m      Constitutional: Patient in no distress  Eyes: not pale, or jaundiced  Neck: no lymphadenopathy  CVS: no added sounds  RS: clear  Abdomen: soft, BS+, gertrude Papa incision present and healed  Skin: no rash  Extremities: no pedal edema  Psych: Alert and oriented x 3    Laboratory:  Hematology:  Recent Labs   Lab Test 198 19  0754   WBC 3.0* 3.6* 3.7*   RBC 3.78* 3.89* 3.49*   HGB 10.8* 11.2* 10.1*   HCT 33.0* 34.4* 30.9*   MCV 87 88 89   MCH 28.6 28.8 28.9   MCHC 32.7 32.6 32.7   RDW 12.7 12.9 13.0    199 170       Chemistry:  Recent Labs   Lab Test 19  0815 19  1218 19  0754    133 135   POTASSIUM 5.5* 5.1 4.8   CHLORIDE 107 102 103   CO2 27 27 25   ANIONGAP 5 4 7   * 284* 180*   BUN 17 20 20   CR 0.87 0.78 0.82   YELENA 8.6 8.9 8.3*       Liver Function Studies:     Recent Labs   Lab Test 19  0815 19  1218 19  0754   PROTTOTAL 6.9 7.5 6.9   ALBUMIN 3.7 4.1 3.6   BILITOTAL 0.7 0.7 0.6   ALKPHOS 189* 211* 201*   AST 12 10 6   ALT 14 17 17       Microbiology:  18   Quant positive     Strongy, toxoplasma and cysticercus Ab negative     Imagin2018   CT chest   Multifocal area of groundglass opacities of the bilateral  lower lobes, to lesser extent left upper lobes. Differentials  including focal atelectasis versus infection. Short-term follow-up to  make sure resolution is recommended.   1.  4 mm  part solid pulmonary nodule within the right lower lobe  posterior medially, attention on follow-up is recommended.  3. Right hepatic lobe hypoattenuating lesion measures 3.8 x 3.0 cm.  Hypoattenuating lesion in the posterior right hepatic lobe measures  1.9 cm. Limited evaluation due to single phase study. Please refer to  same-day MRI study for further characterization.  4. Gallbladder wall thickening. Consider ultrasound.          98

## 2024-11-26 ENCOUNTER — TELEPHONE (OUTPATIENT)
Dept: TRANSPLANT | Facility: CLINIC | Age: 69
End: 2024-11-26
Payer: COMMERCIAL

## 2024-12-30 ENCOUNTER — TELEPHONE (OUTPATIENT)
Dept: GASTROENTEROLOGY | Facility: CLINIC | Age: 69
End: 2024-12-30
Payer: COMMERCIAL

## 2024-12-30 NOTE — TELEPHONE ENCOUNTER
Was the patient contacted by phone and reminded of the upcoming visit? Yes, via .     Was the patient instructed to bring a current list of all medications to the appointment or instructed to bring in all medication bottles? Yes, patient verbalized understanding    Were ordered labs and tests completed prior to the appointment? Labs scheduled 1/7/2025 after cardiology visit, he verbally understood and agreed.     Were the needed lab orders placed? Yes    Joyce Ruvalcaba CMA  12/30/2024 10:45 AM

## 2025-01-06 DIAGNOSIS — E11.9 DM TYPE 2, GOAL HBA1C 7%-8% (H): ICD-10-CM

## 2025-01-07 ENCOUNTER — OFFICE VISIT (OUTPATIENT)
Dept: CARDIOLOGY | Facility: CLINIC | Age: 70
End: 2025-01-07
Attending: INTERNAL MEDICINE
Payer: COMMERCIAL

## 2025-01-07 ENCOUNTER — LAB (OUTPATIENT)
Dept: LAB | Facility: CLINIC | Age: 70
End: 2025-01-07
Payer: COMMERCIAL

## 2025-01-07 VITALS
SYSTOLIC BLOOD PRESSURE: 159 MMHG | WEIGHT: 199 LBS | DIASTOLIC BLOOD PRESSURE: 80 MMHG | HEART RATE: 54 BPM | OXYGEN SATURATION: 100 % | BODY MASS INDEX: 31.17 KG/M2

## 2025-01-07 DIAGNOSIS — E11.69 TYPE 2 DIABETES MELLITUS WITH OTHER SPECIFIED COMPLICATION, WITH LONG-TERM CURRENT USE OF INSULIN (H): ICD-10-CM

## 2025-01-07 DIAGNOSIS — Z79.4 TYPE 2 DIABETES MELLITUS WITH OTHER SPECIFIED COMPLICATION, WITH LONG-TERM CURRENT USE OF INSULIN (H): ICD-10-CM

## 2025-01-07 DIAGNOSIS — R00.0 TACHYCARDIA: ICD-10-CM

## 2025-01-07 DIAGNOSIS — I48.91 ATRIAL FIBRILLATION WITH RAPID VENTRICULAR RESPONSE (H): ICD-10-CM

## 2025-01-07 DIAGNOSIS — E03.9 HYPOTHYROIDISM, UNSPECIFIED TYPE: ICD-10-CM

## 2025-01-07 DIAGNOSIS — Z94.4 LIVER REPLACED BY TRANSPLANT (H): ICD-10-CM

## 2025-01-07 LAB
ALBUMIN SERPL BCG-MCNC: 3.4 G/DL (ref 3.5–5.2)
ALP SERPL-CCNC: 132 U/L (ref 40–150)
ALT SERPL W P-5'-P-CCNC: 15 U/L (ref 0–70)
ANION GAP SERPL CALCULATED.3IONS-SCNC: 7 MMOL/L (ref 7–15)
AST SERPL W P-5'-P-CCNC: 15 U/L (ref 0–45)
BILIRUB DIRECT SERPL-MCNC: <0.2 MG/DL (ref 0–0.3)
BILIRUB SERPL-MCNC: 0.4 MG/DL
BUN SERPL-MCNC: 19.6 MG/DL (ref 8–23)
CALCIUM SERPL-MCNC: 8.6 MG/DL (ref 8.8–10.4)
CHLORIDE SERPL-SCNC: 107 MMOL/L (ref 98–107)
CREAT SERPL-MCNC: 1.28 MG/DL (ref 0.67–1.17)
CREAT UR-MCNC: 159 MG/DL
EGFRCR SERPLBLD CKD-EPI 2021: 60 ML/MIN/1.73M2
ERYTHROCYTE [DISTWIDTH] IN BLOOD BY AUTOMATED COUNT: 13.1 % (ref 10–15)
GLUCOSE SERPL-MCNC: 220 MG/DL (ref 70–99)
HCO3 SERPL-SCNC: 27 MMOL/L (ref 22–29)
HCT VFR BLD AUTO: 40.7 % (ref 40–53)
HGB BLD-MCNC: 13.1 G/DL (ref 13.3–17.7)
MCH RBC QN AUTO: 28.9 PG (ref 26.5–33)
MCHC RBC AUTO-ENTMCNC: 32.2 G/DL (ref 31.5–36.5)
MCV RBC AUTO: 90 FL (ref 78–100)
MICROALBUMIN UR-MCNC: 3911 MG/L
MICROALBUMIN/CREAT UR: 2459.75 MG/G CR (ref 0–17)
PLATELET # BLD AUTO: 239 10E3/UL (ref 150–450)
POTASSIUM SERPL-SCNC: 4.8 MMOL/L (ref 3.4–5.3)
PROT SERPL-MCNC: 6.1 G/DL (ref 6.4–8.3)
RBC # BLD AUTO: 4.53 10E6/UL (ref 4.4–5.9)
SODIUM SERPL-SCNC: 141 MMOL/L (ref 135–145)
TSH SERPL DL<=0.005 MIU/L-ACNC: 1.79 UIU/ML (ref 0.3–4.2)
WBC # BLD AUTO: 5.9 10E3/UL (ref 4–11)

## 2025-01-07 PROCEDURE — 80053 COMPREHEN METABOLIC PANEL: CPT | Performed by: PATHOLOGY

## 2025-01-07 PROCEDURE — 93005 ELECTROCARDIOGRAM TRACING: CPT

## 2025-01-07 PROCEDURE — 85027 COMPLETE CBC AUTOMATED: CPT | Performed by: PATHOLOGY

## 2025-01-07 PROCEDURE — 99000 SPECIMEN HANDLING OFFICE-LAB: CPT | Performed by: PATHOLOGY

## 2025-01-07 PROCEDURE — G0463 HOSPITAL OUTPT CLINIC VISIT: HCPCS | Performed by: INTERNAL MEDICINE

## 2025-01-07 PROCEDURE — 99215 OFFICE O/P EST HI 40 MIN: CPT | Performed by: INTERNAL MEDICINE

## 2025-01-07 PROCEDURE — 82248 BILIRUBIN DIRECT: CPT | Performed by: PATHOLOGY

## 2025-01-07 PROCEDURE — 93242 EXT ECG>48HR<7D RECORDING: CPT | Performed by: INTERNAL MEDICINE

## 2025-01-07 PROCEDURE — 84443 ASSAY THYROID STIM HORMONE: CPT | Performed by: PATHOLOGY

## 2025-01-07 PROCEDURE — 82043 UR ALBUMIN QUANTITATIVE: CPT | Performed by: INTERNAL MEDICINE

## 2025-01-07 PROCEDURE — 36415 COLL VENOUS BLD VENIPUNCTURE: CPT | Performed by: PATHOLOGY

## 2025-01-07 RX ORDER — DILTIAZEM HYDROCHLORIDE 240 MG/1
240 CAPSULE, COATED, EXTENDED RELEASE ORAL DAILY
Qty: 90 CAPSULE | Refills: 3 | Status: SHIPPED | OUTPATIENT
Start: 2025-01-07

## 2025-01-07 ASSESSMENT — PAIN SCALES - GENERAL: PAINLEVEL_OUTOF10: NO PAIN (0)

## 2025-01-07 NOTE — LETTER
2025      RE: Loy Limon  [address]  [facility] [address]       Dear Colleague,    Thank you for the opportunity to participate in the care of your patient, Loy Limon, at the [facility] [place] at [facility]. Please see a copy of my visit note below.    HPI: Mr Limon is a 69-year-old male with permanent atrial fibrillation and a past history of rapid ventricular rate.  Patient also has multiple comorbidities as noted below.  Patient arrived half an hour late for his appointment.  At her last visit we increase his diltiazem from 120 to 240 in an attempt to improve his rate control in A-fib.      Patient indicates that he feels much better with the higher dose of medication    We will repeat a Zio patch today in order to further assess the effectiveness of that treatment change.  Will arrange follow-up to discuss any need for dose/medication change after results are obtained    Patient has been advised to continue anticoagulation.    Prescription for diltiazem to 40 long-acting renewed today      PAST MEDICAL HISTORY:  Past Medical History:   Diagnosis Date     Anemia      Biliary stricture of transplanted liver (H) 2018     BPH (benign prostatic hyperplasia)      Cancer (H)     hepatocellualr carcinoma     Cirrhosis of liver (H) 2018     Diabetes (H)      Enlarged prostate      Hypothyroidism      Impotence of organic origin 2020     Inguinal hernia     Repaired with mesh on 18     Liver lesion 2018     Liver transplanted (H) 2018     donor liver transplant     Portal vein thrombosis     on path explant     Postoperative atrial fibrillation (H) 2018     Prostate cancer (H)      TB lung, latent     Treated        CURRENT MEDICATIONS:  Current Outpatient Medications   Medication Sig Dispense Refill     albuterol (PROAIR HFA/PROVENTIL HFA/VENTOLIN HFA) 108 (90 Base) MCG/ACT  inhaler Inhale 2 puffs into the lungs every 6 hours as needed for shortness of breath, wheezing or cough. 18 g 11     Alcohol Swabs PADS Use to swab the area of the injection or fam as directed Per insurance coverage 200 each 1     apixaban ANTICOAGULANT (ELIQUIS) 5 MG tablet Take 1 tablet (5 mg) by mouth 2 times daily 60 tablet 1     atorvastatin (LIPITOR) 40 MG tablet Take 1 tablet (40 mg) by mouth daily 90 tablet 3     blood glucose (NO BRAND SPECIFIED) lancets standard Use to test blood sugar 2 times daily or as directed. 200 each 3     blood glucose (NO BRAND SPECIFIED) test strip Use to test blood sugar 2 times daily as directed. 200 strip 3     blood glucose monitoring (NO BRAND SPECIFIED) meter device kit Use to test blood sugar 2 times daily or as directed. 1 kit 0     diltiazem ER COATED BEADS (CARDIZEM CD/CARTIA XT) 240 MG 24 hr capsule TAKE 1 CAPSULE(240 MG) BY MOUTH DAILY 90 capsule 1     fluticasone (FLONASE) 50 MCG/ACT nasal spray Spray 1 spray into both nostrils daily. 16 g 5     hydrALAZINE (APRESOLINE) 25 MG tablet Take 1 tablet (25 mg) by mouth 2 times daily. 180 tablet 3     insulin glargine (LANTUS SOLOSTAR) 100 UNIT/ML pen Inject 20 Units Subcutaneous at bedtime 15 mL 1     insulin pen needle (31G X 5 MM) 31G X 5 MM miscellaneous Use one  pen needles daily or as directed. 100 each 3     levothyroxine (SYNTHROID/LEVOTHROID) 150 MCG tablet Take 1 tablet (150 mcg) by mouth daily. 90 tablet 3     metFORMIN (GLUCOPHAGE XR) 500 MG 24 hr tablet Take 2 tablets (1,000 mg) by mouth 2 times daily (with meals). 360 tablet 3     mycophenolate (GENERIC EQUIVALENT) 500 MG tablet Take 1 tablet (500 mg) by mouth 2 times daily 180 tablet 3     predniSONE (DELTASONE) 5 MG tablet Take 1 tablet (5 mg) by mouth daily 90 tablet 1     ursodiol (ACTIGALL) 250 MG tablet Take 1 tablet (250 mg) by mouth 2 times daily 180 tablet 3     Vitamin D3 (CHOLECALCIFEROL) 25 mcg (1000 units) tablet Take 2 tablets (50 mcg) by  mouth daily 180 tablet 3       PAST SURGICAL HISTORY:  Past Surgical History:   Procedure Laterality Date     APPENDECTOMY       BRONCHOSCOPY (RIGID OR FLEXIBLE), DIAGNOSTIC N/A 03/05/2024    Procedure: BRONCHOSCOPY, WITH BRONCHOALVEOLAR LAVAGE;  Surgeon: Jimmy Farley MD;  Location:  GI     COLONOSCOPY      2018     CV CORONARY ANGIOGRAM N/A 10/13/2021    Procedure: CV CORONARY ANGIOGRAM;  Surgeon: Yaya Hampton MD;  Location:  HEART CARDIAC CATH LAB     CV CORONARY LITHOTRIPSY PCI N/A 10/13/2021    Procedure: CV Coronary Lithotripsy PCI;  Surgeon: Yaya Hampton MD;  Location:  HEART CARDIAC CATH LAB     CV INSTANTANEOUS WAVE-FREE RATIO N/A 10/13/2021    Procedure: Instantaneous Wave-Free Ratio;  Surgeon: Yaya Hampton MD;  Location: [facility] CARDIAC CATH LAB     CV INTRAVASULAR ULTRASOUND N/A 10/13/2021    Procedure: Intravascular Ultrasound;  Surgeon: Yaya Hampton MD;  Location:  HEART CARDIAC CATH LAB     CV PCI STENT DRUG ELUTING N/A 10/13/2021    Procedure: Percutaneous Coronary Intervention Stent Drug Eluting;  Surgeon: Yaya Hampton MD;  Location:  HEART CARDIAC CATH LAB     DAVINCI PROSTATECTOMY N/A 01/03/2020    Procedure: Robot-assisted laparoscopic radical prostatectomy, Bladder biopsy;  Surgeon: Kenrick Casiano MD;  Location:  OR     ENDOSCOPIC RETROGRADE CHOLANGIOPANCREATOGRAM N/A 12/28/2018    Procedure: ENDOSCOPIC RETROGRADE CHOLANGIOPANCREATOGRAM, with Billary Sphincterotomy and Billary Stent Placement;  Surgeon: Omero Lawler MD;  Location:  OR     ENDOSCOPIC RETROGRADE CHOLANGIOPANCREATOGRAM  02/18/2019    Procedure: COMBINED ENDOSCOPIC RETROGRADE CHOLANGIOPANCREATOGRAPHY, Bile Duct Stent Exchange;  Surgeon: Omero Lawler MD;  Location: [facility] OR     ENDOSCOPIC RETROGRADE CHOLANGIOPANCREATOGRAM N/A 04/29/2019    Procedure: ENDOSCOPIC RETROGRADE CHOLANGIOPANCREATOGRAPHY, WITH  "BILIARY STENT REMOVAL AND STONE EXTRACTION;  Surgeon: Omero Lawler MD;  Location: [facility] OR     HERNIORRHAPHY INGUINAL  2018    Procedure: Herniorrhaphy inguinal;  Surgeon: Emil Echevarria MD;  Location: [facility] OR     IR LIVER BIOPSY PERCUTANEOUS  2018     LAPAROTOMY EXPLORATORY      per the patient \"for infection in the LLQ\"      PICC INSERTION Right 2024    47 cm basilic     TRANSPLANT LIVER RECIPIENT  DONOR N/A 2018    Procedure: TRANSPLANT LIVER RECIPIENT  DONOR;  Surgeon: Emil Echevarria MD;  Location: [facility] OR       ALLERGIES:   No Known Allergies    FAMILY HISTORY:  Family History   Problem Relation Age of Onset     Memory loss Mother      Liver Disease Brother      Skin Cancer No family hx of      Melanoma No family hx of      SOCIAL HISTORY:  Social History     Tobacco Use     Smoking status: Former     Current packs/day: 0.00     Average packs/day: (1.4 ttl pk-yrs)     Types: Cigarettes, Cigars     Start date: 1970     Quit date: 3/14/2018     Years since quittin.8     Smokeless tobacco: Never     Tobacco comments:     Quit smoking 2018, one cigarette after dinner   Vaping Use     Vaping status: Never Used   Substance Use Topics     Alcohol use: No     Comment: Quit drinking 2018     Drug use: No       ROS:   Constitutional: No fever, chills, or sweats. Weight stable.   ENT: No visual disturbance, ear ache, epistaxis, sore throat.   Cardiovascular: As per HPI.   Respiratory: No cough, hemoptysis.    GI: No nausea, vomiting, hematemesis, melena, or hematochezia.   : No hematuria.   Integument: Negative.   Psychiatric: Negative.   Hematologic:  Easy bruising, no easy bleeding.  Neuro: Negative.   Endocrinology: No significant heat or cold intolerance   Musculoskeletal: No myalgia.    Exam:  There were no vitals taken for this visit.  GENERAL APPEARANCE: healthy, alert and no distress  HEENT: no icterus, , no central cyanosis  NECK: no " adenopathy,JVP not elevated  RESPIRATORY: - no rales, rhonchi or wheezes, no use of accessory muscles, no retractions, respirations are unlabored, normal respiratory rate  CARDIOVASCULAR: irregular rhythm,   ABDOMEN: soft, non tender,   EXTREMITIES: peripheral pulses normal, no edema, no bruits  NEURO: alert and oriented to person/place/time, normal speech, gait and affect  SKIN: no ecchymoses, no rashes    Labs:  CBC RESULTS:   Lab Results   Component Value Date    WBC 6.5 08/13/2024    WBC 7.0 07/07/2021    RBC 5.19 08/13/2024    RBC 4.83 07/07/2021    HGB 14.5 08/13/2024    HGB 14.0 07/07/2021    HCT 44.6 08/13/2024    HCT 40.1 07/07/2021    MCV 86 08/13/2024    MCV 83 07/07/2021    MCH 27.9 08/13/2024    MCH 29.0 07/07/2021    MCHC 32.5 08/13/2024    MCHC 34.9 07/07/2021    RDW 14.1 08/13/2024    RDW 12.2 07/07/2021     08/13/2024     07/07/2021       BMP RESULTS:  Lab Results   Component Value Date     09/20/2024     07/07/2021    POTASSIUM 4.9 09/20/2024    POTASSIUM 5.0 07/19/2023    POTASSIUM 4.5 07/27/2022    POTASSIUM 4.7 07/07/2021    CHLORIDE 103 09/20/2024    CHLORIDE 106 07/27/2022    CHLORIDE 106 07/07/2021    CO2 25 09/20/2024    CO2 26 07/27/2022    CO2 25 07/07/2021    ANIONGAP 10 09/20/2024    ANIONGAP 6 07/27/2022    ANIONGAP 6 07/07/2021     (H) 09/20/2024     (H) 04/17/2024     (H) 07/27/2022     (H) 07/07/2021    BUN 24.0 (H) 09/20/2024    BUN 19 07/27/2022    BUN 26 07/07/2021    CR 1.23 (H) 09/20/2024    CR 0.79 07/07/2021    GFRESTIMATED 63 09/20/2024    GFRESTIMATED >90 07/07/2021    GFRESTBLACK >90 07/07/2021    YELENA 8.8 09/20/2024    YELENA 8.3 (L) 07/07/2021        INR RESULTS:  Lab Results   Component Value Date    INR 1.21 (H) 04/13/2024    INR 1.07 04/12/2024    INR 1.08 02/29/2024    INR 0.92 03/11/2023    INR 1.02 04/23/2021    INR 1.14 12/31/2018    INR 1.36 (H) 12/23/2018    INR 1.61 (H) 12/23/2018       Procedures:  PULMONARY  FUNCTION TESTS:       Latest Ref Rng & Units 8/6/2024     6:06 AM   PFT   FVC L 2.80    FEV1 L 1.89    FVC% % 80    FEV1% % 70          ECHOCARDIOGRAM:   No results found for this or any previous visit (from the past 8760 hours).      Assessment and Plan:   1.  Atrial fibrillation-permanent== with past history of rapid ventricular response  2.  Ischemic heart disease--stable    Plan  1.  Apply a Zio patch to assess heart rate control on current medication  2.  Send refill prescription to [facility] for diltiazem 240 long-acting--4-month supply  3.  Follow-up to be arranged after #1 complete    Total elapsed time today with chart review, clinic visit and documentation 40 minutes.    I very much appreciated the opportunity to see and assess Loy Limon in the clinic today. Please do not hesitate to contact my office if you have any questions or concerns.      Rolly Plascencia MD  Cardiac Arrhythmia Service  [facility]  [phone]       CC  ROLLY PLASCENCIA    Please do not hesitate to contact me if you have any questions/concerns.     Sincerely,     Rolly Plascencia MD

## 2025-01-07 NOTE — PATIENT INSTRUCTIONS
Plan:    7-day Zio Patch. Will call with results.  Follow up in 6 months.      Your Care Team:  EP Cardiology   Telephone Number     Richard Caballero RN [phone]    After business hours: [phone], ask for cardiologist on-call   Non-procedure scheduling:    Chelsi HERRERA   [phone]   Procedure scheduling:    Nai Nichole   [phone]   Device Clinic (Pacemakers, ICDs, Loop Recorders)    During business hours: [phone]  After business hours:   [phone]- select option 4 and ask for job code [id number].       Cardiovascular Clinic:   [address] [place] [address]. [place] [address]      As always, thank you for trusting us with your health care needs!

## 2025-01-07 NOTE — NURSING NOTE
Loy Limon arrived here on 1/7/2025 3:24 PM for 3-7 Days  Zio monitor placement per ordering provider Thais for the diagnosis a-fib.  Dr. Muller is the supervising MD. Patient s skin was prepped per protocol.  Zio monitor was placed.  Instructions were reviewed with and given to the patient.  Patient verbalized understanding of wear, troubleshooting and monitor return instructions.

## 2025-01-07 NOTE — NURSING NOTE
Chief Complaint   Patient presents with    Follow Up     RETURN EP     Vitals were taken and medications reconciled.    Colin Beckwith, EMT  3:11 PM

## 2025-01-07 NOTE — PROGRESS NOTES
HPI: Mr Limon is a 69-year-old male with permanent atrial fibrillation and a past history of rapid ventricular rate.  Patient also has multiple comorbidities as noted below.  Patient arrived half an hour late for his appointment.  At her last visit we increase his diltiazem from 120 to 240 in an attempt to improve his rate control in A-fib.      Patient indicates that he feels much better with the higher dose of medication    We will repeat a Zio patch today in order to further assess the effectiveness of that treatment change.  Will arrange follow-up to discuss any need for dose/medication change after results are obtained    Patient has been advised to continue anticoagulation.    Prescription for diltiazem to 40 long-acting renewed today      PAST MEDICAL HISTORY:  Past Medical History:   Diagnosis Date    Anemia     Biliary stricture of transplanted liver (H) 2018    BPH (benign prostatic hyperplasia)     Cancer (H)     hepatocellualr carcinoma    Cirrhosis of liver (H) 2018    Diabetes (H)     Enlarged prostate     Hypothyroidism     Impotence of organic origin 2020    Inguinal hernia     Repaired with mesh on 18    Liver lesion 2018    Liver transplanted (H) 2018     donor liver transplant    Portal vein thrombosis     on path explant    Postoperative atrial fibrillation (H) 2018    Prostate cancer (H)     TB lung, latent     Treated        CURRENT MEDICATIONS:  Current Outpatient Medications   Medication Sig Dispense Refill    albuterol (PROAIR HFA/PROVENTIL HFA/VENTOLIN HFA) 108 (90 Base) MCG/ACT inhaler Inhale 2 puffs into the lungs every 6 hours as needed for shortness of breath, wheezing or cough. 18 g 11    Alcohol Swabs PADS Use to swab the area of the injection or fam as directed Per insurance coverage 200 each 1    apixaban ANTICOAGULANT (ELIQUIS) 5 MG tablet Take 1 tablet (5 mg) by mouth 2 times daily 60 tablet 1    atorvastatin (LIPITOR) 40 MG  tablet Take 1 tablet (40 mg) by mouth daily 90 tablet 3    blood glucose (NO BRAND SPECIFIED) lancets standard Use to test blood sugar 2 times daily or as directed. 200 each 3    blood glucose (NO BRAND SPECIFIED) test strip Use to test blood sugar 2 times daily as directed. 200 strip 3    blood glucose monitoring (NO BRAND SPECIFIED) meter device kit Use to test blood sugar 2 times daily or as directed. 1 kit 0    diltiazem ER COATED BEADS (CARDIZEM CD/CARTIA XT) 240 MG 24 hr capsule TAKE 1 CAPSULE(240 MG) BY MOUTH DAILY 90 capsule 1    fluticasone (FLONASE) 50 MCG/ACT nasal spray Spray 1 spray into both nostrils daily. 16 g 5    hydrALAZINE (APRESOLINE) 25 MG tablet Take 1 tablet (25 mg) by mouth 2 times daily. 180 tablet 3    insulin glargine (LANTUS SOLOSTAR) 100 UNIT/ML pen Inject 20 Units Subcutaneous at bedtime 15 mL 1    insulin pen needle (31G X 5 MM) 31G X 5 MM miscellaneous Use one  pen needles daily or as directed. 100 each 3    levothyroxine (SYNTHROID/LEVOTHROID) 150 MCG tablet Take 1 tablet (150 mcg) by mouth daily. 90 tablet 3    metFORMIN (GLUCOPHAGE XR) 500 MG 24 hr tablet Take 2 tablets (1,000 mg) by mouth 2 times daily (with meals). 360 tablet 3    mycophenolate (GENERIC EQUIVALENT) 500 MG tablet Take 1 tablet (500 mg) by mouth 2 times daily 180 tablet 3    predniSONE (DELTASONE) 5 MG tablet Take 1 tablet (5 mg) by mouth daily 90 tablet 1    ursodiol (ACTIGALL) 250 MG tablet Take 1 tablet (250 mg) by mouth 2 times daily 180 tablet 3    Vitamin D3 (CHOLECALCIFEROL) 25 mcg (1000 units) tablet Take 2 tablets (50 mcg) by mouth daily 180 tablet 3       PAST SURGICAL HISTORY:  Past Surgical History:   Procedure Laterality Date    APPENDECTOMY      BRONCHOSCOPY (RIGID OR FLEXIBLE), DIAGNOSTIC N/A 03/05/2024    Procedure: BRONCHOSCOPY, WITH BRONCHOALVEOLAR LAVAGE;  Surgeon: Jimmy Farley MD;  Location:  GI    COLONOSCOPY      2018    CV CORONARY ANGIOGRAM N/A 10/13/2021    Procedure: CV CORONARY  "ANGIOGRAM;  Surgeon: Yaya Hampton MD;  Location:  HEART CARDIAC CATH LAB    CV CORONARY LITHOTRIPSY PCI N/A 10/13/2021    Procedure: CV Coronary Lithotripsy PCI;  Surgeon: Yaya Hampton MD;  Location:  HEART CARDIAC CATH LAB    CV INSTANTANEOUS WAVE-FREE RATIO N/A 10/13/2021    Procedure: Instantaneous Wave-Free Ratio;  Surgeon: Yaya Hampton MD;  Location:  HEART CARDIAC CATH LAB    CV INTRAVASULAR ULTRASOUND N/A 10/13/2021    Procedure: Intravascular Ultrasound;  Surgeon: Yaya Hampton MD;  Location:  HEART CARDIAC CATH LAB    CV PCI STENT DRUG ELUTING N/A 10/13/2021    Procedure: Percutaneous Coronary Intervention Stent Drug Eluting;  Surgeon: Yaya Hampton MD;  Location:  HEART CARDIAC CATH LAB    DAVINCI PROSTATECTOMY N/A 01/03/2020    Procedure: Robot-assisted laparoscopic radical prostatectomy, Bladder biopsy;  Surgeon: Kenrick Casiano MD;  [facility]    ENDOSCOPIC RETROGRADE CHOLANGIOPANCREATOGRAM N/A 12/28/2018    Procedure: ENDOSCOPIC RETROGRADE CHOLANGIOPANCREATOGRAM, with Billary Sphincterotomy and Billary Stent Placement;  Surgeon: Omero Lawler MD;  [facility] OR    ENDOSCOPIC RETROGRADE CHOLANGIOPANCREATOGRAM  02/18/2019    Procedure: COMBINED ENDOSCOPIC RETROGRADE CHOLANGIOPANCREATOGRAPHY, Bile Duct Stent Exchange;  Surgeon: Omero Lawler MD;  Location: [facility] OR    ENDOSCOPIC RETROGRADE CHOLANGIOPANCREATOGRAM N/A 04/29/2019    Procedure: ENDOSCOPIC RETROGRADE CHOLANGIOPANCREATOGRAPHY, WITH BILIARY STENT REMOVAL AND STONE EXTRACTION;  Surgeon: Omero Lawler MD;  [facility] OR    HERNIORRHAPHY INGUINAL  12/22/2018    Procedure: Herniorrhaphy inguinal;  Surgeon: Emil Echevarria MD;  [facility] OR    IR LIVER BIOPSY PERCUTANEOUS  12/31/2018    LAPAROTOMY EXPLORATORY      per the patient \"for infection in the LLQ\"     PICC INSERTION Right 03/06/2024    47 cm " basilic    TRANSPLANT LIVER RECIPIENT  DONOR N/A 2018    Procedure: TRANSPLANT LIVER RECIPIENT  DONOR;  Surgeon: Emil Echevarria MD;  [facility]       ALLERGIES:   No Known Allergies    FAMILY HISTORY:  Family History   Problem Relation Age of Onset    Memory loss Mother     Liver Disease Brother     Skin Cancer No family hx of     Melanoma No family hx of      SOCIAL HISTORY:  Social History     Tobacco Use    Smoking status: Former     Current packs/day: 0.00     Average packs/day: (1.4 ttl pk-yrs)     Types: Cigarettes, Cigars     Start date: 1970     Quit date: 3/14/2018     Years since quittin.8    Smokeless tobacco: Never    Tobacco comments:     Quit smoking 2018, one cigarette after dinner   Vaping Use    Vaping status: Never Used   Substance Use Topics    Alcohol use: No     Comment: Quit drinking 2018    Drug use: No       ROS:   Constitutional: No fever, chills, or sweats. Weight stable.   ENT: No visual disturbance, ear ache, epistaxis, sore throat.   Cardiovascular: As per HPI.   Respiratory: No cough, hemoptysis.    GI: No nausea, vomiting, hematemesis, melena, or hematochezia.   : No hematuria.   Integument: Negative.   Psychiatric: Negative.   Hematologic:  Easy bruising, no easy bleeding.  Neuro: Negative.   Endocrinology: No significant heat or cold intolerance   Musculoskeletal: No myalgia.    Exam:  There were no vitals taken for this visit.  GENERAL APPEARANCE: healthy, alert and no distress  HEENT: no icterus, , no central cyanosis  NECK: no adenopathy,JVP not elevated  RESPIRATORY: - no rales, rhonchi or wheezes, no use of accessory muscles, no retractions, respirations are unlabored, normal respiratory rate  CARDIOVASCULAR: irregular rhythm,   ABDOMEN: soft, non tender,   EXTREMITIES: peripheral pulses normal, no edema, no bruits  NEURO: alert and oriented to person/place/time, normal speech, gait and affect  SKIN: no ecchymoses, no  roula    Labs:  CBC RESULTS:   Lab Results   Component Value Date    WBC 6.5 08/13/2024    WBC 7.0 07/07/2021    RBC 5.19 08/13/2024    RBC 4.83 07/07/2021    HGB 14.5 08/13/2024    HGB 14.0 07/07/2021    HCT 44.6 08/13/2024    HCT 40.1 07/07/2021    MCV 86 08/13/2024    MCV 83 07/07/2021    MCH 27.9 08/13/2024    MCH 29.0 07/07/2021    MCHC 32.5 08/13/2024    MCHC 34.9 07/07/2021    RDW 14.1 08/13/2024    RDW 12.2 07/07/2021     08/13/2024     07/07/2021       BMP RESULTS:  Lab Results   Component Value Date     09/20/2024     07/07/2021    POTASSIUM 4.9 09/20/2024    POTASSIUM 5.0 07/19/2023    POTASSIUM 4.5 07/27/2022    POTASSIUM 4.7 07/07/2021    CHLORIDE 103 09/20/2024    CHLORIDE 106 07/27/2022    CHLORIDE 106 07/07/2021    CO2 25 09/20/2024    CO2 26 07/27/2022    CO2 25 07/07/2021    ANIONGAP 10 09/20/2024    ANIONGAP 6 07/27/2022    ANIONGAP 6 07/07/2021     (H) 09/20/2024     (H) 04/17/2024     (H) 07/27/2022     (H) 07/07/2021    BUN 24.0 (H) 09/20/2024    BUN 19 07/27/2022    BUN 26 07/07/2021    CR 1.23 (H) 09/20/2024    CR 0.79 07/07/2021    GFRESTIMATED 63 09/20/2024    GFRESTIMATED >90 07/07/2021    GFRESTBLACK >90 07/07/2021    YELENA 8.8 09/20/2024    YELENA 8.3 (L) 07/07/2021        INR RESULTS:  Lab Results   Component Value Date    INR 1.21 (H) 04/13/2024    INR 1.07 04/12/2024    INR 1.08 02/29/2024    INR 0.92 03/11/2023    INR 1.02 04/23/2021    INR 1.14 12/31/2018    INR 1.36 (H) 12/23/2018    INR 1.61 (H) 12/23/2018       Procedures:  PULMONARY FUNCTION TESTS:       Latest Ref Rng & Units 8/6/2024     6:06 AM   PFT   FVC L 2.80    FEV1 L 1.89    FVC% % 80    FEV1% % 70          ECHOCARDIOGRAM:   No results found for this or any previous visit (from the past 8760 hours).      Assessment and Plan:   1.  Atrial fibrillation-permanent== with past history of rapid ventricular response  2.  Ischemic heart disease--stable    Plan  1.  Apply a Zio  patch to assess heart rate control on current medication  2.  [facility] refill [facility] to [facility] for diltiazem 240 long-acting--4-month supply  3.  Follow-up to be arranged after #1 complete    Total elapsed time today with chart review, clinic visit and documentation 40 minutes.    I very much appreciated the opportunity to see and assess Loy Limon in the clinic today. Please do not hesitate to contact my office if you have any questions or concerns.      Rolly Plascencia MD  Cardiac Arrhythmia Service  [facility]  [phone]       CC  ROLLY PLASCENCIA

## 2025-01-09 RX ORDER — ATORVASTATIN CALCIUM 40 MG/1
40 TABLET, FILM COATED ORAL DAILY
Qty: 90 TABLET | Refills: 2 | Status: SHIPPED | OUTPATIENT
Start: 2025-01-09

## 2025-01-09 NOTE — TELEPHONE ENCOUNTER
LVD:  9/20/2024  [facility] Avis/ Chaitanya  LDL Cholesterol Calculated   Date Value Ref Range Status   08/13/2024 53 <=100 mg/dL Final   09/25/2020 69 <100 mg/dL Final     Comment:     Desirable:       <100 mg/dl     Atorvastatin 40 MG    Refilled per protocol.

## 2025-01-14 ENCOUNTER — OFFICE VISIT (OUTPATIENT)
Dept: GASTROENTEROLOGY | Facility: CLINIC | Age: 70
End: 2025-01-14
Attending: STUDENT IN AN ORGANIZED HEALTH CARE EDUCATION/TRAINING PROGRAM
Payer: COMMERCIAL

## 2025-01-14 VITALS
RESPIRATION RATE: 16 BRPM | WEIGHT: 198 LBS | HEIGHT: 67 IN | HEART RATE: 74 BPM | TEMPERATURE: 97.5 F | DIASTOLIC BLOOD PRESSURE: 99 MMHG | OXYGEN SATURATION: 98 % | BODY MASS INDEX: 31.08 KG/M2 | SYSTOLIC BLOOD PRESSURE: 158 MMHG

## 2025-01-14 DIAGNOSIS — C61 PROSTATE CANCER (H): ICD-10-CM

## 2025-01-14 DIAGNOSIS — Z94.4 LIVER TRANSPLANT RECIPIENT (H): ICD-10-CM

## 2025-01-14 DIAGNOSIS — N18.2 CKD (CHRONIC KIDNEY DISEASE) STAGE 2, GFR 60-89 ML/MIN: Primary | ICD-10-CM

## 2025-01-14 PROCEDURE — 99214 OFFICE O/P EST MOD 30 MIN: CPT | Performed by: STUDENT IN AN ORGANIZED HEALTH CARE EDUCATION/TRAINING PROGRAM

## 2025-01-14 PROCEDURE — G0463 HOSPITAL OUTPT CLINIC VISIT: HCPCS | Performed by: STUDENT IN AN ORGANIZED HEALTH CARE EDUCATION/TRAINING PROGRAM

## 2025-01-14 PROCEDURE — T1013 SIGN LANG/ORAL INTERPRETER: HCPCS | Mod: GT

## 2025-01-14 ASSESSMENT — PAIN SCALES - GENERAL: PAINLEVEL_OUTOF10: NO PAIN (0)

## 2025-01-14 NOTE — NURSING NOTE
"Chief Complaint   Patient presents with    RECHECK     S/P Liver transplant      Vital signs:  Temp: 97.5  F (36.4  C) Temp src: Oral BP: (!) 158/99 Pulse: 74   Resp: 16 SpO2: 98 %     Height: 170.2 cm (5' 7.01\") Weight: 89.8 kg (198 lb)  Estimated body mass index is 31 kg/m  as calculated from the following:    Height as of this encounter: 1.702 m (5' 7.01\").    Weight as of this encounter: 89.8 kg (198 lb).    Staff  Farzaneh on tablet during rooming process.       Joyce Ruvalcaba CMA  1/14/2025 9:30 AM    "

## 2025-01-14 NOTE — LETTER
2025      Loy Limon  [address]  [facility] [id number]      Dear Colleague,    Thank you for referring your patient, Loy Limon, to the [facility]PAT[facility] [place]. Please see a copy of my visit note below.    [facility] Hepatology    Follow-up Visit    CONSULTING HEALTHCARE PROVIDERS:  Jaswinder Anne MD    CHIEF COMPLAINT AND REASON FOR VISIT:  Status post liver transplantation.    SUBJECTIVE:  Mr. Limon is a 69-year-old Bulgarian-speaking male with a history significant for alcoholic cirrhosis complicated by hepatocellular carcinoma, status post bridge therapy with TACE, who in 2018 received a  donor liver transplantation.     Explant 2018    B. NATIVE LIVER AND GALLBLADDER, HEPATECTOMY:  - Segment 8 moderately differentiated hepatocellular carcinoma, 90%  necrotic, 3.2 cm in maximum dimension  - Segment 7 moderately differentiated hepatocellular carcinoma with clear  cell features, 1.1 cm in maximum  dimension  - Segment 5 moderately differentiated hepatocellular carcinoma, 1.4 cm in  maximum dimension  - No evidence of vascular or perineural invasion  - Margins negative for malignancy  - Background advanced chronic liver disease with bridging fibrosis and  nodule formation (stage 4)  - Gallbladder with mild chronic cholecystitis  - Three benign lymph nodes  - Incidental subcapsular scar and bile duct adenoma  - See synoptic report for details     No history of rejection    Diabetes has been very poorly controlled lately, may be getting better with lantus at night regularly    Needs to follow up with urology    Is working    Notices weird sensations around his scar and his abdomen but no pain    Medical hx Surgical hx   Past Medical History:   Diagnosis Date     Anemia      Biliary stricture of transplanted liver (H) 2018     BPH (benign prostatic hyperplasia)      Cancer (H)     hepatocellualr carcinoma     Cirrhosis of liver (H) 2018      Diabetes (H)      Enlarged prostate      Hypothyroidism      Impotence of organic origin 2020     Inguinal hernia     Repaired with mesh on 18     Liver lesion 2018     Liver transplanted (H) 2018     donor liver transplant     Portal vein thrombosis     on path explant     Postoperative atrial fibrillation (H) 2018     Prostate cancer (H)      TB lung, latent     Treated       Past Surgical History:   Procedure Laterality Date     APPENDECTOMY       BRONCHOSCOPY (RIGID OR FLEXIBLE), DIAGNOSTIC N/A 2024    Procedure: BRONCHOSCOPY, WITH BRONCHOALVEOLAR LAVAGE;  Surgeon: Jimmy Farley MD;  [facility]: [facility]     COLONOSCOPY           CV CORONARY ANGIOGRAM N/A 10/13/2021    Procedure: CV CORONARY ANGIOGRAM;  Surgeon: Yaya Hampton MD;  Location:  HEART CARDIAC CATH LAB     CV CORONARY LITHOTRIPSY PCI N/A 10/13/2021    Procedure: CV Coronary Lithotripsy PCI;  Surgeon: Yaya Hampton MD;  Location:  HEART CARDIAC CATH LAB     CV INSTANTANEOUS WAVE-FREE RATIO N/A 10/13/2021    Procedure: Instantaneous Wave-Free Ratio;  Surgeon: Yaya Hampton MD;  Location:  HEART CARDIAC CATH LAB     CV INTRAVASULAR ULTRASOUND N/A 10/13/2021    Procedure: Intravascular Ultrasound;  Surgeon: Yaya Hampton MD;  Location:  HEART CARDIAC CATH LAB     CV PCI STENT DRUG ELUTING N/A 10/13/2021    Procedure: Percutaneous Coronary Intervention Stent Drug Eluting;  Surgeon: Yaya Hampton MD;  Location:  HEART CARDIAC CATH LAB     DAVINCI PROSTATECTOMY N/A 2020    Procedure: Robot-assisted laparoscopic radical prostatectomy, Bladder biopsy;  Surgeon: Kenrick Casiano MD;  Location: [facility]     ENDOSCOPIC RETROGRADE CHOLANGIOPANCREATOGRAM N/A 2018    Procedure: ENDOSCOPIC RETROGRADE CHOLANGIOPANCREATOGRAM, with Billary Sphincterotomy and Billary Stent Placement;  Surgeon: Omero Lawler  "MD Feliciano;  [facility]     ENDOSCOPIC RETROGRADE CHOLANGIOPANCREATOGRAM  2019    Procedure: COMBINED ENDOSCOPIC RETROGRADE CHOLANGIOPANCREATOGRAPHY, Bile Duct Stent Exchange;  Surgeon: Omero Lawler MD;  [facility]     ENDOSCOPIC RETROGRADE CHOLANGIOPANCREATOGRAM N/A 2019    Procedure: ENDOSCOPIC RETROGRADE CHOLANGIOPANCREATOGRAPHY, WITH BILIARY STENT REMOVAL AND STONE EXTRACTION;  Surgeon: Omero Lawler MD;  [facility]     HERNIORRHAPHY INGUINAL  2018    Procedure: Herniorrhaphy inguinal;  Surgeon: Emil Echevraria MD;  [facility]     IR LIVER BIOPSY PERCUTANEOUS  2018     LAPAROTOMY EXPLORATORY      per the patient \"for infection in the LLQ\"      PICC INSERTION Right 2024    47 cm basilic     TRANSPLANT LIVER RECIPIENT  DONOR N/A 2018    Procedure: TRANSPLANT LIVER RECIPIENT  DONOR;  Surgeon: Emil Echevarria MD;  [facility]          Medications  Prior to Admission medications    Medication Sig Start Date End Date Taking? Authorizing Provider   amLODIPine (NORVASC) 10 MG tablet Take 1 tablet (10 mg) by mouth daily 3/20/23  Yes Izabela Lima MD   aspirin (ASA) 81 MG EC tablet Take 1 tablet (81 mg) by mouth daily for 180 days 10/28/22 4/26/23 Yes Miguel Ángel Hunter MD   atorvastatin (LIPITOR) 40 MG tablet Take 1 tablet (40 mg) by mouth daily 22  Yes Jaswinder Anne MD   insulin pen needle (31G X 5 MM) 31G X 5 MM miscellaneous Use one  pen needles daily or as directed. 3/8/23  Yes Leia Edward PA-C   levothyroxine (SYNTHROID/LEVOTHROID) 137 MCG tablet Take 1 tablet (137 mcg) by mouth daily 23  Yes Jaswinder Anne MD   losartan (COZAAR) 100 MG tablet Take 1 tablet (100 mg) by mouth daily 10/21/22  Yes Jacqueline Juarez NP   metFORMIN (GLUCOPHAGE XR) 500 MG 24 hr tablet Take 2 tablets (1,000 mg) by mouth 2 times daily (with meals) 3/9/23  Yes Jaswinder Anne MD "   mycophenolate (GENERIC EQUIVALENT) 250 MG capsule TAKE 3 CAPSULES(750 MG) BY MOUTH TWICE DAILY 7/8/22  Yes Tamela Carballo MD   nitroGLYcerin (NITROSTAT) 0.4 MG sublingual tablet For chest pain place 1 tablet under the tongue every 5 minutes for 3 doses. If symptoms persist 5 minutes after 1st dose call 911. 3/9/23  Yes Miguel Ángel Hunter MD   predniSONE (DELTASONE) 5 MG tablet Take 1 tablet (5 mg) by mouth daily 1/27/23  Yes Jaswinder Anne MD   ursodiol (ACTIGALL) 250 MG tablet Take 1 tablet (250 mg) by mouth 2 times daily 10/21/22  Yes Jacqueline Juarez NP   Vitamin D3 (CHOLECALCIFEROL) 25 mcg (1000 units) tablet Take 2 tablets (50 mcg) by mouth daily 10/21/22  Yes Jacqueline Juarez NP   blood glucose (NO BRAND SPECIFIED) lancets standard Use to test blood sugar 2 times daily or as directed.  Patient not taking: Reported on 10/28/2022 10/21/22   Jacqueline Juarez NP   blood glucose (NO BRAND SPECIFIED) test strip Use to test blood sugar 2 times daily or as directed. One touch ultra test strips  Patient not taking: Reported on 10/28/2022 10/27/22   Leia Edward PA-C   blood glucose (ONE TOUCH SURESOFT) lancing device Lancing device to be used with lancets.  Patient not taking: Reported on 10/28/2022 10/27/22   Leia Edward PA-C   blood glucose monitoring (ONE TOUCH ULTRA 2) meter device kit Use to test blood sugar 2 times daily or as directed.  Patient not taking: Reported on 10/28/2022 10/21/22   Jacqueline Juarez NP   blood glucose monitoring (ONE TOUCH ULTRASOFT) lancets Use to test blood sugar 2 times daily.  Patient not taking: Reported on 10/28/2022 10/27/22   Leia Edward PA-C   Blood Pressure Monitor KIT Automatic Blood Pressure Monitor  Patient not taking: Reported on 10/28/2022 10/21/22   Jacqueline Juarez NP   dulaglutide (TRULICITY) 1.5 MG/0.5ML pen Inject 1.5 mg Subcutaneous every 7 days  Patient not taking: Reported on 10/28/2022  "10/27/22   Leia Edward PA-C   guaiFENesin-dextromethorphan (ROBITUSSIN DM) 100-10 MG/5ML syrup Take 10 mLs by mouth every 4 hours as needed for cough  Patient not taking: Reported on 10/28/2022 10/21/22   Jacqueline Juarez, NP   insulin glargine (LANTUS PEN) 100 UNIT/ML pen Inject 20 Units Subcutaneous At Bedtime  Patient not taking: Reported on 10/28/2022 10/27/22   Leia Edward PA-C       Allergies  No Known Allergies    Review of systems  A 10-point review of systems was negative    Examination  BP (!) 158/99 (BP Location: Left arm, Patient Position: Sitting, Cuff Size: Adult Regular)   Pulse 74   Temp 97.5  F (36.4  C) (Oral)   Resp 16   Ht 1.702 m (5' 7.01\")   Wt 89.8 kg (198 lb)   SpO2 98%   BMI 31.00 kg/m    Body mass index is 31 kg/m .    Gen- well, NAD, A+Ox3, normal color  Lym- no palpable LAD  CVS- RRR  RS- CTA  Abd- Healed surgical scar.  Extr- hands normal, no OSKAR  Skin- no rash or jaundice  Neuro- no asterixis  Psych- normal mood    Laboratory   Reviewed in EHR       Radiology      MRI 5/2024    IMPRESSION:    1. Postoperative changes of liver transplantation without suspicious  liver lesion.  2. Mild heterogenous peripheral hepatic parenchymal enhancement, which  can be seen with passive hepatic congestion.   3. Incompletely evaluated reticular/consolidative signal in the left  upper lobe, which raises the concern for infectious/inflammatory  process. Recommend dedicated chest CT for further evaluation if  clinically indicated.  4. Bilateral pleural effusions.  5. Based on this exam only, the patient is within Antonio criteria.      ASSESSMENT AND PLAN:  Status post liver transplantation.  Mr. Limon received a liver transplantation in 2018.  Regarding that, he is doing quite well.  He is compliant with his medications.  He has had no episodes of rejection.  He is on  mg twice a day and prednisone 5 mg daily.  He is still on ursodiol.  He is more than 5 years posttransplant " has not had any evidence of recurrent liver cancer.      Discussed his biggest issues right now include his high blood pressure and his diabetes.  It appears his diabetes is doing better since he is taking the Lantus regularly.  Discussed that we are seeing a lot of protein in his urine likely related to both of these issues.  I have referred him to see nephrology.  He is seeing his endocrinologist in February.  He is also following up with cardiology.  He is due to follow-up with urology, placed referral.      I would consider switching him to tacrolimus given his worsening diabetes with prednisone, but this need to be balanced against his CKD with significant proteinuria.      Continue mycophenolate 500 mg twice a day.  Continue prednisone 5 mg daily  Okay to stop ursodiol  Labs every 2 to 3 months  He should follow-up with endocrinology as scheduled.  Referral to nephrology and urology.    Izabela Galvan MD  Hepatology  [facility]    RV 12  months      Again, thank you for allowing me to participate in the care of your patient.        Sincerely,        Izabela Galvan MD    Electronically signed

## 2025-01-14 NOTE — PROGRESS NOTES
[facility] Hepatology    Follow-up Visit    CONSULTING HEALTHCARE PROVIDERS:  Jaswinder Anne MD    CHIEF COMPLAINT AND REASON FOR VISIT:  Status post liver transplantation.    SUBJECTIVE:  Mr. Limon is a 69-year-old Vietnamese-speaking male with a history significant for alcoholic cirrhosis complicated by hepatocellular carcinoma, status post bridge therapy with TACE, who in 2018 received a  donor liver transplantation.     Explant 2018    B. NATIVE LIVER AND GALLBLADDER, HEPATECTOMY:  - Segment 8 moderately differentiated hepatocellular carcinoma, 90%  necrotic, 3.2 cm in maximum dimension  - Segment 7 moderately differentiated hepatocellular carcinoma with clear  cell features, 1.1 cm in maximum  dimension  - Segment 5 moderately differentiated hepatocellular carcinoma, 1.4 cm in  maximum dimension  - No evidence of vascular or perineural invasion  - Margins negative for malignancy  - Background advanced chronic liver disease with bridging fibrosis and  nodule formation (stage 4)  - Gallbladder with mild chronic cholecystitis  - Three benign lymph nodes  - Incidental subcapsular scar and bile duct adenoma  - See synoptic report for details     No history of rejection    Diabetes has been very poorly controlled lately, may be getting better with lantus at night regularly    Needs to follow up with urology    Is working    Notices weird sensations around his scar and his abdomen but no pain    Medical hx Surgical hx   Past Medical History:   Diagnosis Date    Anemia     Biliary stricture of transplanted liver (H) 2018    BPH (benign prostatic hyperplasia)     Cancer (H)     hepatocellualr carcinoma    Cirrhosis of liver (H) 2018    Diabetes (H)     Enlarged prostate     Hypothyroidism     Impotence of organic origin 2020    Inguinal hernia     Repaired with mesh on 18    Liver lesion 2018    Liver transplanted (H) 2018     donor liver transplant    Portal  vein thrombosis     on path explant    Postoperative atrial fibrillation (H) 12/25/2018    Prostate cancer (H)     TB lung, latent     Treated       Past Surgical History:   Procedure Laterality Date    APPENDECTOMY      BRONCHOSCOPY (RIGID OR FLEXIBLE), DIAGNOSTIC N/A 03/05/2024    Procedure: BRONCHOSCOPY, WITH BRONCHOALVEOLAR LAVAGE;  Surgeon: Jimmy Farley MD;  Location:  GI    COLONOSCOPY      2018    CV CORONARY ANGIOGRAM N/A 10/13/2021    Procedure: CV CORONARY ANGIOGRAM;  Surgeon: Yaya Hampton MD;  Location:  HEART CARDIAC CATH LAB    CV CORONARY LITHOTRIPSY PCI N/A 10/13/2021    Procedure: CV Coronary Lithotripsy PCI;  Surgeon: Yaya Hampton MD;  Location:  HEART CARDIAC CATH LAB    CV INSTANTANEOUS WAVE-FREE RATIO N/A 10/13/2021    Procedure: Instantaneous Wave-Free Ratio;  Surgeon: Yaya Hampton MD;  Location:  HEART CARDIAC CATH LAB    CV INTRAVASULAR ULTRASOUND N/A 10/13/2021    Procedure: Intravascular Ultrasound;  Surgeon: Yaya Hampton MD;  Location:  HEART CARDIAC CATH LAB    CV PCI STENT DRUG ELUTING N/A 10/13/2021    Procedure: Percutaneous Coronary Intervention Stent Drug Eluting;  Surgeon: Yaya Hampton MD;  Location:  HEART CARDIAC CATH LAB    DAVINCI PROSTATECTOMY N/A 01/03/2020    Procedure: Robot-assisted laparoscopic radical prostatectomy, Bladder biopsy;  Surgeon: Kenrick Casiano MD;  Location:  OR    ENDOSCOPIC RETROGRADE CHOLANGIOPANCREATOGRAM N/A 12/28/2018    Procedure: ENDOSCOPIC RETROGRADE CHOLANGIOPANCREATOGRAM, with Billary Sphincterotomy and Billary Stent Placement;  Surgeon: Omero Lawler MD;  Location:  OR    ENDOSCOPIC RETROGRADE CHOLANGIOPANCREATOGRAM  02/18/2019    Procedure: COMBINED ENDOSCOPIC RETROGRADE CHOLANGIOPANCREATOGRAPHY, Bile Duct Stent Exchange;  Surgeon: Omero Lawler MD;  Location:  OR    ENDOSCOPIC RETROGRADE CHOLANGIOPANCREATOGRAM  "N/A 2019    Procedure: ENDOSCOPIC RETROGRADE CHOLANGIOPANCREATOGRAPHY, WITH BILIARY STENT REMOVAL AND STONE EXTRACTION;  Surgeon: Omero Lawler MD;  [facility]    HERNIORRHAPHY INGUINAL  2018    Procedure: Herniorrhaphy inguinal;  Surgeon: Emil Echevarria MD;  [facility]    IR LIVER BIOPSY PERCUTANEOUS  2018    LAPAROTOMY EXPLORATORY      per the patient \"for infection in the LLQ\"     PICC INSERTION Right 2024    47 cm basilic    TRANSPLANT LIVER RECIPIENT  DONOR N/A 2018    Procedure: TRANSPLANT LIVER RECIPIENT  DONOR;  Surgeon: Emil Echevarria MD;  [facility]          Medications  Prior to Admission medications    Medication Sig Start Date End Date Taking? Authorizing Provider   amLODIPine (NORVASC) 10 MG tablet Take 1 tablet (10 mg) by mouth daily 3/20/23  Yes Izabela Lima MD   aspirin (ASA) 81 MG EC tablet Take 1 tablet (81 mg) by mouth daily for 180 days 10/28/22 4/26/23 Yes Miguel Ángel Hunter MD   atorvastatin (LIPITOR) 40 MG tablet Take 1 tablet (40 mg) by mouth daily 22  Yes Jaswinder Anne MD   insulin pen needle (31G X 5 MM) 31G X 5 MM miscellaneous Use one  pen needles daily or as directed. 3/8/23  Yes Leia Edward PA-C   levothyroxine (SYNTHROID/LEVOTHROID) 137 MCG tablet Take 1 tablet (137 mcg) by mouth daily 23  Yes Jaswinder Anne MD   losartan (COZAAR) 100 MG tablet Take 1 tablet (100 mg) by mouth daily 10/21/22  Yes AnnJacqueline NP   metFORMIN (GLUCOPHAGE XR) 500 MG 24 hr tablet Take 2 tablets (1,000 mg) by mouth 2 times daily (with meals) 3/9/23  Yes Jaswinder Anne MD   mycophenolate (GENERIC EQUIVALENT) 250 MG capsule TAKE 3 CAPSULES(750 MG) BY MOUTH TWICE DAILY 22  Yes Tamela Carballo MD   nitroGLYcerin (NITROSTAT) 0.4 MG sublingual tablet For chest pain place 1 tablet under the tongue every 5 minutes for 3 doses. If symptoms persist 5 minutes " after 1st dose call 911. 3/9/23  Yes Miguel Ángel Hunter MD   predniSONE (DELTASONE) 5 MG tablet Take 1 tablet (5 mg) by mouth daily 1/27/23  Yes Jaswinder Anne MD   ursodiol (ACTIGALL) 250 MG tablet Take 1 tablet (250 mg) by mouth 2 times daily 10/21/22  Yes Jacqueline Juarez NP   Vitamin D3 (CHOLECALCIFEROL) 25 mcg (1000 units) tablet Take 2 tablets (50 mcg) by mouth daily 10/21/22  Yes Jacqueline Juarez NP   blood glucose (NO BRAND SPECIFIED) lancets standard Use to test blood sugar 2 times daily or as directed.  Patient not taking: Reported on 10/28/2022 10/21/22   Jacqueline Juarez NP   blood glucose (NO BRAND SPECIFIED) test strip Use to test blood sugar 2 times daily or as directed. One touch ultra test strips  Patient not taking: Reported on 10/28/2022 10/27/22   Leia Edward PA-C   blood glucose (ONE TOUCH SURESOFT) lancing device Lancing device to be used with lancets.  Patient not taking: Reported on 10/28/2022 10/27/22   Leia Edward PA-C   blood glucose monitoring (ONE TOUCH ULTRA 2) meter device kit Use to test blood sugar 2 times daily or as directed.  Patient not taking: Reported on 10/28/2022 10/21/22   Jacqueline Juarez NP   blood glucose monitoring (ONE TOUCH ULTRASOFT) lancets Use to test blood sugar 2 times daily.  Patient not taking: Reported on 10/28/2022 10/27/22   Leia Edward PA-C   Blood Pressure Monitor KIT Automatic Blood Pressure Monitor  Patient not taking: Reported on 10/28/2022 10/21/22   Jacqueline Juarez NP   dulaglutide (TRULICITY) 1.5 MG/0.5ML pen Inject 1.5 mg Subcutaneous every 7 days  Patient not taking: Reported on 10/28/2022 10/27/22   Leia Edward PA-C   guaiFENesin-dextromethorphan (ROBITUSSIN DM) 100-10 MG/5ML syrup Take 10 mLs by mouth every 4 hours as needed for cough  Patient not taking: Reported on 10/28/2022 10/21/22   Jacqueline Juarez, NP   insulin glargine (LANTUS PEN) 100 UNIT/ML pen Inject 20 Units  "Subcutaneous At Bedtime  Patient not taking: Reported on 10/28/2022 10/27/22   Leia Edward PA-C       Allergies  No Known Allergies    Review of systems  A 10-point review of systems was negative    Examination  BP (!) 158/99 (BP Location: Left arm, Patient Position: Sitting, Cuff Size: Adult Regular)   Pulse 74   Temp 97.5  F (36.4  C) (Oral)   Resp 16   Ht 1.702 m (5' 7.01\")   Wt 89.8 kg (198 lb)   SpO2 98%   BMI 31.00 kg/m    Body mass index is 31 kg/m .    Gen- well, NAD, A+Ox3, normal color  Lym- no palpable LAD  CVS- RRR  RS- CTA  Abd- Healed surgical scar.  Extr- hands normal, no OSKAR  Skin- no rash or jaundice  Neuro- no asterixis  Psych- normal mood    Laboratory   Reviewed in EHR       Radiology      MRI 5/2024    IMPRESSION:    1. Postoperative changes of liver transplantation without suspicious  liver lesion.  2. Mild heterogenous peripheral hepatic parenchymal enhancement, which  can be seen with passive hepatic congestion.   3. Incompletely evaluated reticular/consolidative signal in the left  upper lobe, which raises the concern for infectious/inflammatory  process. Recommend dedicated chest CT for further evaluation if  clinically indicated.  4. Bilateral pleural effusions.  5. Based on this exam only, the patient is within Antonio criteria.      ASSESSMENT AND PLAN:  Status post liver transplantation.  Mr. Limon received a liver transplantation in 2018.  Regarding that, he is doing quite well.  He is compliant with his medications.  He has had no episodes of rejection.  He is on  mg twice a day and prednisone 5 mg daily.  He is still on ursodiol.  He is more than 5 years posttransplant has not had any evidence of recurrent liver cancer.      Discussed his biggest issues right now include his high blood pressure and his diabetes.  It appears his diabetes is doing better since he is taking the Lantus regularly.  Discussed that we are seeing a lot of protein in his urine likely related " to both of these issues.  I have referred him to see nephrology.  He is seeing his endocrinologist in February.  He is also following up with cardiology.  He is due to follow-up with urology, placed referral.      I would consider switching him to tacrolimus given his worsening diabetes with prednisone, but this need to be balanced against his CKD with significant proteinuria.      Continue mycophenolate 500 mg twice a day.  Continue prednisone 5 mg daily  Okay to stop ursodiol  Labs every 2 to 3 months  He should follow-up with endocrinology as scheduled.  Referral to nephrology and urology.    Izabela Galvan MD  Hepatology  [facility]    RV 12  months

## 2025-01-20 ENCOUNTER — APPOINTMENT (OUTPATIENT)
Dept: CT IMAGING | Facility: CLINIC | Age: 70
End: 2025-01-20
Attending: EMERGENCY MEDICINE
Payer: COMMERCIAL

## 2025-01-20 ENCOUNTER — HOSPITAL ENCOUNTER (EMERGENCY)
Facility: CLINIC | Age: 70
Discharge: HOME | End: 2025-01-21
Attending: EMERGENCY MEDICINE
Payer: COMMERCIAL

## 2025-01-20 DIAGNOSIS — R10.12 LEFT UPPER QUADRANT ABDOMINAL PAIN: ICD-10-CM

## 2025-01-20 LAB
ALBUMIN SERPL BCG-MCNC: 3.2 G/DL (ref 3.5–5.2)
ALBUMIN UR-MCNC: 100 MG/DL
ALP SERPL-CCNC: 111 U/L (ref 40–150)
ALT SERPL W P-5'-P-CCNC: 15 U/L (ref 0–70)
ANION GAP SERPL CALCULATED.3IONS-SCNC: 8 MMOL/L (ref 7–15)
APPEARANCE UR: CLEAR
AST SERPL W P-5'-P-CCNC: 18 U/L (ref 0–45)
BASOPHILS # BLD AUTO: 0 10E3/UL (ref 0–0.2)
BASOPHILS NFR BLD AUTO: 0 %
BILIRUB SERPL-MCNC: 0.5 MG/DL
BILIRUB UR QL STRIP: NEGATIVE
BUN SERPL-MCNC: 29.2 MG/DL (ref 8–23)
CALCIUM SERPL-MCNC: 8.7 MG/DL (ref 8.8–10.4)
CHLORIDE SERPL-SCNC: 102 MMOL/L (ref 98–107)
COLOR UR AUTO: ABNORMAL
CREAT SERPL-MCNC: 1.15 MG/DL (ref 0.67–1.17)
EGFRCR SERPLBLD CKD-EPI 2021: 68 ML/MIN/1.73M2
EOSINOPHIL # BLD AUTO: 0 10E3/UL (ref 0–0.7)
EOSINOPHIL NFR BLD AUTO: 0 %
ERYTHROCYTE [DISTWIDTH] IN BLOOD BY AUTOMATED COUNT: 13.1 % (ref 10–15)
GLUCOSE SERPL-MCNC: 296 MG/DL (ref 70–99)
GLUCOSE UR STRIP-MCNC: 100 MG/DL
HCO3 SERPL-SCNC: 24 MMOL/L (ref 22–29)
HCT VFR BLD AUTO: 42.8 % (ref 40–53)
HGB BLD-MCNC: 14.3 G/DL (ref 13.3–17.7)
HGB UR QL STRIP: ABNORMAL
HOLD SPECIMEN: NORMAL
IMM GRANULOCYTES # BLD: 0 10E3/UL
IMM GRANULOCYTES NFR BLD: 0 %
KETONES UR STRIP-MCNC: NEGATIVE MG/DL
LACTATE SERPL-SCNC: 1.4 MMOL/L (ref 0.7–2)
LEUKOCYTE ESTERASE UR QL STRIP: NEGATIVE
LIPASE SERPL-CCNC: 15 U/L (ref 13–60)
LYMPHOCYTES # BLD AUTO: 1.3 10E3/UL (ref 0.8–5.3)
LYMPHOCYTES NFR BLD AUTO: 14 %
MCH RBC QN AUTO: 29.3 PG (ref 26.5–33)
MCHC RBC AUTO-ENTMCNC: 33.4 G/DL (ref 31.5–36.5)
MCV RBC AUTO: 88 FL (ref 78–100)
MONOCYTES # BLD AUTO: 0.6 10E3/UL (ref 0–1.3)
MONOCYTES NFR BLD AUTO: 7 %
MUCOUS THREADS #/AREA URNS LPF: PRESENT /LPF
NEUTROPHILS # BLD AUTO: 7 10E3/UL (ref 1.6–8.3)
NEUTROPHILS NFR BLD AUTO: 79 %
NITRATE UR QL: NEGATIVE
NRBC # BLD AUTO: 0 10E3/UL
NRBC BLD AUTO-RTO: 0 /100
PH UR STRIP: 6 [PH] (ref 5–7)
PLATELET # BLD AUTO: 215 10E3/UL (ref 150–450)
POTASSIUM SERPL-SCNC: 4.3 MMOL/L (ref 3.4–5.3)
PROT SERPL-MCNC: 5.8 G/DL (ref 6.4–8.3)
RBC # BLD AUTO: 4.88 10E6/UL (ref 4.4–5.9)
RBC URINE: 1 /HPF
SODIUM SERPL-SCNC: 134 MMOL/L (ref 135–145)
SP GR UR STRIP: 1.01 (ref 1–1.03)
SQUAMOUS EPITHELIAL: <1 /HPF
UROBILINOGEN UR STRIP-MCNC: NORMAL MG/DL
WBC # BLD AUTO: 8.9 10E3/UL (ref 4–11)
WBC URINE: <1 /HPF

## 2025-01-20 PROCEDURE — 74177 CT ABD & PELVIS W/CONTRAST: CPT | Mod: 26 | Performed by: RADIOLOGY

## 2025-01-20 PROCEDURE — 81003 URINALYSIS AUTO W/O SCOPE: CPT | Performed by: EMERGENCY MEDICINE

## 2025-01-20 PROCEDURE — 99285 EMERGENCY DEPT VISIT HI MDM: CPT | Performed by: EMERGENCY MEDICINE

## 2025-01-20 PROCEDURE — 250N000011 HC RX IP 250 OP 636: Performed by: EMERGENCY MEDICINE

## 2025-01-20 PROCEDURE — 85041 AUTOMATED RBC COUNT: CPT | Performed by: EMERGENCY MEDICINE

## 2025-01-20 PROCEDURE — 85004 AUTOMATED DIFF WBC COUNT: CPT | Performed by: EMERGENCY MEDICINE

## 2025-01-20 PROCEDURE — 36415 COLL VENOUS BLD VENIPUNCTURE: CPT | Performed by: EMERGENCY MEDICINE

## 2025-01-20 PROCEDURE — 74177 CT ABD & PELVIS W/CONTRAST: CPT

## 2025-01-20 PROCEDURE — 93010 ELECTROCARDIOGRAM REPORT: CPT | Performed by: EMERGENCY MEDICINE

## 2025-01-20 PROCEDURE — 93005 ELECTROCARDIOGRAM TRACING: CPT | Performed by: EMERGENCY MEDICINE

## 2025-01-20 PROCEDURE — 84484 ASSAY OF TROPONIN QUANT: CPT | Performed by: EMERGENCY MEDICINE

## 2025-01-20 PROCEDURE — 83605 ASSAY OF LACTIC ACID: CPT | Performed by: EMERGENCY MEDICINE

## 2025-01-20 PROCEDURE — 84520 ASSAY OF UREA NITROGEN: CPT | Performed by: EMERGENCY MEDICINE

## 2025-01-20 PROCEDURE — 96361 HYDRATE IV INFUSION ADD-ON: CPT | Performed by: EMERGENCY MEDICINE

## 2025-01-20 PROCEDURE — 99285 EMERGENCY DEPT VISIT HI MDM: CPT | Mod: 25 | Performed by: EMERGENCY MEDICINE

## 2025-01-20 PROCEDURE — 258N000003 HC RX IP 258 OP 636: Performed by: EMERGENCY MEDICINE

## 2025-01-20 PROCEDURE — 82310 ASSAY OF CALCIUM: CPT | Performed by: EMERGENCY MEDICINE

## 2025-01-20 PROCEDURE — 96374 THER/PROPH/DIAG INJ IV PUSH: CPT | Mod: 59 | Performed by: EMERGENCY MEDICINE

## 2025-01-20 PROCEDURE — 85014 HEMATOCRIT: CPT | Performed by: EMERGENCY MEDICINE

## 2025-01-20 PROCEDURE — 83690 ASSAY OF LIPASE: CPT | Performed by: EMERGENCY MEDICINE

## 2025-01-20 RX ORDER — MAGNESIUM HYDROXIDE/ALUMINUM HYDROXICE/SIMETHICONE 120; 1200; 1200 MG/30ML; MG/30ML; MG/30ML
30 SUSPENSION ORAL ONCE
Status: COMPLETED | OUTPATIENT
Start: 2025-01-20 | End: 2025-01-21

## 2025-01-20 RX ORDER — IOPAMIDOL 755 MG/ML
122 INJECTION, SOLUTION INTRAVASCULAR ONCE
Status: COMPLETED | OUTPATIENT
Start: 2025-01-20 | End: 2025-01-20

## 2025-01-20 RX ORDER — HYDROMORPHONE HYDROCHLORIDE 1 MG/ML
0.5 INJECTION, SOLUTION INTRAMUSCULAR; INTRAVENOUS; SUBCUTANEOUS EVERY 30 MIN PRN
Status: DISCONTINUED | OUTPATIENT
Start: 2025-01-20 | End: 2025-01-21 | Stop reason: HOSPADM

## 2025-01-20 RX ORDER — IOPAMIDOL 755 MG/ML
122 INJECTION, SOLUTION INTRAVASCULAR ONCE
Status: CANCELLED | OUTPATIENT
Start: 2025-01-20 | End: 2025-01-20

## 2025-01-20 RX ORDER — ONDANSETRON 2 MG/ML
4 INJECTION INTRAMUSCULAR; INTRAVENOUS EVERY 30 MIN PRN
Status: DISCONTINUED | OUTPATIENT
Start: 2025-01-20 | End: 2025-01-21 | Stop reason: HOSPADM

## 2025-01-20 RX ADMIN — HYDROMORPHONE HYDROCHLORIDE 0.5 MG: 1 INJECTION, SOLUTION INTRAMUSCULAR; INTRAVENOUS; SUBCUTANEOUS at 19:37

## 2025-01-20 RX ADMIN — IOPAMIDOL 122 ML: 755 INJECTION, SOLUTION INTRAVENOUS at 22:44

## 2025-01-20 RX ADMIN — SODIUM CHLORIDE 1000 ML: 9 INJECTION, SOLUTION INTRAVENOUS at 19:46

## 2025-01-20 ASSESSMENT — ACTIVITIES OF DAILY LIVING (ADL)
ADLS_ACUITY_SCORE: 59

## 2025-01-21 ENCOUNTER — PRE VISIT (OUTPATIENT)
Dept: UROLOGY | Facility: CLINIC | Age: 70
End: 2025-01-21
Payer: COMMERCIAL

## 2025-01-21 VITALS
DIASTOLIC BLOOD PRESSURE: 98 MMHG | SYSTOLIC BLOOD PRESSURE: 174 MMHG | RESPIRATION RATE: 18 BRPM | TEMPERATURE: 98.3 F | HEART RATE: 88 BPM | OXYGEN SATURATION: 97 %

## 2025-01-21 LAB
ATRIAL RATE - MUSE: NORMAL BPM
DIASTOLIC BLOOD PRESSURE - MUSE: NORMAL MMHG
HOLD SPECIMEN: NORMAL
INTERPRETATION ECG - MUSE: NORMAL
P AXIS - MUSE: NORMAL DEGREES
PR INTERVAL - MUSE: NORMAL MS
QRS DURATION - MUSE: 98 MS
QT - MUSE: 388 MS
QTC - MUSE: 439 MS
R AXIS - MUSE: -30 DEGREES
SYSTOLIC BLOOD PRESSURE - MUSE: NORMAL MMHG
T AXIS - MUSE: 143 DEGREES
TROPONIN T SERPL HS-MCNC: 50 NG/L
TROPONIN T SERPL HS-MCNC: 55 NG/L
VENTRICULAR RATE- MUSE: 77 BPM

## 2025-01-21 PROCEDURE — 250N000011 HC RX IP 250 OP 636: Performed by: EMERGENCY MEDICINE

## 2025-01-21 PROCEDURE — 96375 TX/PRO/DX INJ NEW DRUG ADDON: CPT | Performed by: EMERGENCY MEDICINE

## 2025-01-21 PROCEDURE — 250N000013 HC RX MED GY IP 250 OP 250 PS 637: Performed by: EMERGENCY MEDICINE

## 2025-01-21 PROCEDURE — 36415 COLL VENOUS BLD VENIPUNCTURE: CPT | Performed by: EMERGENCY MEDICINE

## 2025-01-21 PROCEDURE — 84484 ASSAY OF TROPONIN QUANT: CPT | Performed by: EMERGENCY MEDICINE

## 2025-01-21 RX ADMIN — FAMOTIDINE 20 MG: 10 INJECTION, SOLUTION INTRAVENOUS at 00:13

## 2025-01-21 RX ADMIN — ALUMINUM HYDROXIDE, MAGNESIUM HYDROXIDE, AND SIMETHICONE 30 ML: 200; 200; 20 SUSPENSION ORAL at 00:13

## 2025-01-21 ASSESSMENT — ACTIVITIES OF DAILY LIVING (ADL)
ADLS_ACUITY_SCORE: 59

## 2025-01-21 NOTE — DISCHARGE INSTRUCTIONS
Follow up with your clinic doctor later this week. Use the medication daily as prescribed. Return with any new or worsening symptoms or any other concerns.

## 2025-01-21 NOTE — ED PROVIDER NOTES
Was accepted at shift change signout with plan for me to follow-up on the delta troponin and discharge if there was not a concerning elevation and the patient continued to feel reasonably well.  Initial troponin was 50, with a delta troponin at 55 after several hours.  That he is within the gender specific reference range and elevation was less than 7 with a low underlying suspicion, per Dr. Navarro, ACS, as well as the fact that the patient states that his current level of discomfort is minimal, I do think he can be discharged home with a plan to follow-up with his outpatient provider later this week.  The patient states that symptoms come and go, or at least wax and wane.  I will give a prescription for omeprazole to see if that helps.  He is encouraged to return to the ER with any new or worsening symptoms, any other concerns.  He verbalizes understanding and is agreeable to the plan.    Dictation Disclaimer: Some of this Note has been completed with voice-recognition dictation software. Although errors are generally corrected real-time, there is the potential for a rare error to be present in the completed chart.       Milly Cardenas MD  01/21/25 [id number]

## 2025-01-21 NOTE — TELEPHONE ENCOUNTER
Reason for visit: 6 month PSA check from  (7/24/24)     Relevant information: **NEEDS **, hx of prostate cancer, lung transplant pt, type 2 diabetes    Records/imaging/labs/orders: All records available    At Rooming:  Standard rooming      Dominic Baeza  1/21/2025  10:38 AM

## 2025-01-21 NOTE — ED TRIAGE NOTES
Pt ambulatory to triage with CC of abdominal pain and swelling, started yesterday twice as bad today. VSS in triage. Liver txp 2018. Turkish speaking, some english     Triage Assessment (Adult)       Row Name 01/20/25 0205          Triage Assessment    Airway WDL WDL        Respiratory WDL    Respiratory WDL WDL        Skin Circulation/Temperature WDL    Skin Circulation/Temperature WDL WDL        Cardiac WDL    Cardiac WDL WDL        Peripheral/Neurovascular WDL    Peripheral Neurovascular WDL WDL        Cognitive/Neuro/Behavioral WDL    Cognitive/Neuro/Behavioral WDL WDL

## 2025-01-21 NOTE — ED PROVIDER NOTES
ED Provider Note  2025  [facility]      History     Chief Complaint: Abdominal Pain      HPI  Loy Limon is a 69 year old male with a past medical history of alcoholic cirrhosis complicated by hepatocellular carcinoma s/p  donor liver transplantation in 2018, A-fib with RVR on 240 MG diltiazem and Eliquis, latent TB s/p treatment, BPH, type 2 DM, CKD stage 2 who presents to the Emergency Department with abdominal pain.     Pain worsening for 2 days diffuse throughout the abdomen primarily in the upper.  Associate with nausea without vomiting.  No diarrhea.  Normal bowel movement yesterday.    No chest pain or shortness of breath.    Patient reports abdominal pain and distention that onset yesterday and worsened today.    Per Chart Review,  He has had no episodes of rejection. He is on  mg twice a day and prednisone 5 mg daily. He is still on ursodiol. He is more than 5 years posttransplant has not had any evidence of recurrent liver cancer. He is taking the Lantus regularly for diabetes.      Past Medical History  Past Medical History:   Diagnosis Date    Anemia     Biliary stricture of transplanted liver (H) 2018    BPH (benign prostatic hyperplasia)     Cancer (H)     hepatocellualr carcinoma    Cirrhosis of liver (H) 2018    Diabetes (H)     Enlarged prostate     Hypothyroidism     Impotence of organic origin 2020    Inguinal hernia     Repaired with mesh on 18    Liver lesion 2018    Liver transplanted (H) 2018     donor liver transplant    Portal vein thrombosis     on path explant    Postoperative atrial fibrillation (H) 2018    Prostate cancer (H)     TB lung, latent     Treated      Past Surgical History:   Procedure Laterality Date    APPENDECTOMY      BRONCHOSCOPY (RIGID OR FLEXIBLE), DIAGNOSTIC N/A 2024    Procedure: BRONCHOSCOPY, WITH BRONCHOALVEOLAR LAVAGE;  Surgeon: Jimmy Farley,  MD;  Location:  GI    COLONOSCOPY      2018    CV CORONARY ANGIOGRAM N/A 10/13/2021    Procedure: CV CORONARY ANGIOGRAM;  Surgeon: Yaya Hampton MD;  Location:  HEART CARDIAC CATH LAB    CV CORONARY LITHOTRIPSY PCI N/A 10/13/2021    Procedure: CV Coronary Lithotripsy PCI;  Surgeon: Yaya Hampton MD;  Location:  HEART CARDIAC CATH LAB    CV INSTANTANEOUS WAVE-FREE RATIO N/A 10/13/2021    Procedure: Instantaneous Wave-Free Ratio;  Surgeon: Yaya Hampton MD;  Location:  HEART CARDIAC CATH LAB    CV INTRAVASULAR ULTRASOUND N/A 10/13/2021    Procedure: Intravascular Ultrasound;  Surgeon: Yaya Hampton MD;  Location:  HEART CARDIAC CATH LAB    CV PCI STENT DRUG ELUTING N/A 10/13/2021    Procedure: Percutaneous Coronary Intervention Stent Drug Eluting;  Surgeon: Yaya Hampton MD;  Location:  HEART CARDIAC CATH LAB    DAVINCI PROSTATECTOMY N/A 01/03/2020    Procedure: Robot-assisted laparoscopic radical prostatectomy, Bladder biopsy;  Surgeon: Kenrick Casiano MD;  Location: [facility]    ENDOSCOPIC RETROGRADE CHOLANGIOPANCREATOGRAM N/A 12/28/2018    Procedure: ENDOSCOPIC RETROGRADE CHOLANGIOPANCREATOGRAM, with Billary Sphincterotomy and Billary Stent Placement;  Surgeon: Omero Lawler MD;  Location: [facility] OR    ENDOSCOPIC RETROGRADE CHOLANGIOPANCREATOGRAM  02/18/2019    Procedure: COMBINED ENDOSCOPIC RETROGRADE CHOLANGIOPANCREATOGRAPHY, Bile Duct Stent Exchange;  Surgeon: Omero Lawler MD;  Location: [facility] OR    ENDOSCOPIC RETROGRADE CHOLANGIOPANCREATOGRAM N/A 04/29/2019    Procedure: ENDOSCOPIC RETROGRADE CHOLANGIOPANCREATOGRAPHY, WITH BILIARY STENT REMOVAL AND STONE EXTRACTION;  Surgeon: Omero Lawler MD;  Location: [facility] OR    HERNIORRHAPHY INGUINAL  12/22/2018    Procedure: Herniorrhaphy inguinal;  Surgeon: Emil Echevarria MD;  Location: [facility] OR    IR LIVER BIOPSY PERCUTANEOUS  12/31/2018     "LAPAROTOMY EXPLORATORY      per the patient \"for infection in the LLQ\"     PICC INSERTION Right 2024    47 cm basilic    TRANSPLANT LIVER RECIPIENT  DONOR N/A 2018    Procedure: TRANSPLANT LIVER RECIPIENT  DONOR;  Surgeon: Emil Echevarria MD;  Location: [facility] OR     albuterol (PROAIR HFA/PROVENTIL HFA/VENTOLIN HFA) 108 (90 Base) MCG/ACT inhaler  Alcohol Swabs PADS  apixaban ANTICOAGULANT (ELIQUIS) 5 MG tablet  atorvastatin (LIPITOR) 40 MG tablet  blood glucose (NO BRAND SPECIFIED) lancets standard  blood glucose (NO BRAND SPECIFIED) test strip  blood glucose monitoring (NO BRAND SPECIFIED) meter device kit  diltiazem ER COATED BEADS (CARDIZEM CD/CARTIA XT) 240 MG 24 hr capsule  fluticasone (FLONASE) 50 MCG/ACT nasal spray  hydrALAZINE (APRESOLINE) 25 MG tablet  insulin glargine (LANTUS SOLOSTAR) 100 UNIT/ML pen  insulin pen needle (31G X 5 MM) 31G X 5 MM miscellaneous  levothyroxine (SYNTHROID/LEVOTHROID) 150 MCG tablet  metFORMIN (GLUCOPHAGE XR) 500 MG 24 hr tablet  mycophenolate (GENERIC EQUIVALENT) 500 MG tablet  predniSONE (DELTASONE) 5 MG tablet  ursodiol (ACTIGALL) 250 MG tablet  Vitamin D3 (CHOLECALCIFEROL) 25 mcg (1000 units) tablet      No Known Allergies  Family History  Family History   Problem Relation Age of Onset    Memory loss Mother     Liver Disease Brother     Skin Cancer No family hx of     Melanoma No family hx of      Social History   Social History     Tobacco Use    Smoking status: Former     Current packs/day: 0.00     Average packs/day: (1.4 ttl pk-yrs)     Types: Cigarettes, Cigars     Start date: 1970     Quit date: 3/14/2018     Years since quittin.8    Smokeless tobacco: Never    Tobacco comments:     Quit smoking 2018, one cigarette after dinner   Vaping Use    Vaping status: Never Used   Substance Use Topics    Alcohol use: No     Comment: Quit drinking 2018    Drug use: No        Past medical history, past surgical history, medications, " allergies, family history, and social history were reviewed with the patient. No additional pertinent items.      A medically appropriate review of systems was performed with pertinent positives and negatives noted in the HPI, and all other systems negative.    Physical Exam   BP (!) 147/98   Pulse 88   Temp 98.3  F (36.8  C) (Oral)   Resp 22   SpO2 96%     GEN: non toxic, cooperative and conversant.   HEENT: The head is normocephalic and atraumatic. Pupils are equal round and reactive to light. Extraocular motions are intact. There is no facial swelling. The neck is nontender and supple.   CV: Regular rate and rhythm. 2+ radial pulses bilaterally.  PULM: Clear to auscultation bilaterally.  ABD: Soft, diffusely tender to palpation, nondistended.   EXT: Full range of motion.  No edema.  NEURO: Cranial nerves II through XII are intact and symmetric. Bilateral upper and lower extremities grossly show full range of motion without any focal deficits.   PSYCH: Calm and cooperative, interactive.       ED Course, Procedures, & Data      Procedures       {ED Course Selections (Optional):967164}             Results for orders placed or performed during the hospital encounter of 01/20/25   CT Abdomen Pelvis w Contrast     Status: None    Narrative    EXAM: CT ABDOMEN PELVIS W CONTRAST  LOCATION: [facility]  DATE: 1/20/2025    INDICATION: worsening abdominal pain x 2 days  COMPARISON: MR abdomen 5/21/2024. Renal ultrasound 4/14/2024. CT abdomen and pelvis without contrast 4/14/2024. CT abdomen and pelvis with contrast 2/28/2024 and 3/11/2023.  TECHNIQUE: CT scan of the abdomen and pelvis was performed following injection of IV contrast. Multiplanar reformats were obtained. Dose reduction techniques were used.  CONTRAST: iopamidol (ISOVUE 370) solution 122 mL    FINDINGS:   LOWER CHEST: Small right and trace left pleural effusions. Coronary artery calcification, particularly of  the left anterior descending coronary artery.    HEPATOBILIARY: Stable postoperative changes of liver transplantation. No concerning focal hepatic lesion visualized. Post cholecystectomy.    PANCREAS: Normal.    SPLEEN: Normal.    ADRENAL GLANDS: Normal.    KIDNEYS/BLADDER: Chronic cortical scarring of both kidneys.    BOWEL: Small periampullary duodenal diverticulum. No obstruction or inflammation. No free air or free fluid.    LYMPH NODES: Normal.    VASCULATURE: Moderate aortoiliac atherosclerosis. No aneurysm    PELVIC ORGANS: Normal.    MUSCULOSKELETAL: Degenerative changes of the spine.      Impression    IMPRESSION:   1.  No acute process identified in the abdomen or pelvis.  2.  Small right and trace left pleural effusions.  3.  Coronary artery calcification, particularly of the left anterior descending coronary artery.  4.  Stable postoperative changes of liver transplantation.  5.  Postcholecystectomy.  6.  Chronic cortical scarring of both kidneys.   [place] Draw     Status: None    Narrative    The following orders were created for panel order [place] Draw.  Procedure                               Abnormality         Status                     ---------                               -----------         ------                     Extra Blue Top Tube[[id number]]                              Final result               Extra Red Top Tube[[id number]]                               Final result               Extra Green Top (Lithium...[[id number]]                      Final result               Extra Purple Top Tube[[id number]]                            Final result                 Please view results for these tests on the individual orders.   Extra Blue Top Tube     Status: None   Result Value Ref Range    Hold Specimen [facility]    Extra Red Top Tube     Status: None   Result Value Ref Range    Hold Specimen [facility]    Extra Green Top (Lithium Heparin) Tube     Status: None   Result Value Ref Range    Hold Specimen [facility]     Extra Purple Top Tube     Status: None   Result Value Ref Range    Hold Specimen [facility]    [place] Draw     Status: None    Narrative    The following orders were created for panel order [place] Draw.  Procedure                               Abnormality         Status                     ---------                               -----------         ------                     Extra Green Top (Lithium...[[id number]]                      Final result                 Please view results for these tests on the individual orders.   Extra Green Top (Lithium Heparin) Tube     Status: None   Result Value Ref Range    Hold Specimen [facility]    Comprehensive metabolic panel     Status: Abnormal   Result Value Ref Range    Sodium 134 (L) 135 - 145 mmol/L    Potassium 4.3 3.4 - 5.3 mmol/L    Carbon Dioxide (CO2) 24 22 - 29 mmol/L    Anion Gap 8 7 - 15 mmol/L    Urea Nitrogen 29.2 (H) 8.0 - 23.0 mg/dL    Creatinine 1.15 0.67 - 1.17 mg/dL    GFR Estimate 68 >60 mL/min/1.73m2    Calcium 8.7 (L) 8.8 - 10.4 mg/dL    Chloride 102 98 - 107 mmol/L    Glucose 296 (H) 70 - 99 mg/dL    Alkaline Phosphatase 111 40 - 150 U/L    AST 18 0 - 45 U/L    ALT 15 0 - 70 U/L    Protein Total 5.8 (L) 6.4 - 8.3 g/dL    Albumin 3.2 (L) 3.5 - 5.2 g/dL    Bilirubin Total 0.5 <=1.2 mg/dL   Lipase     Status: Normal   Result Value Ref Range    Lipase 15 13 - 60 U/L   Lactic acid whole blood with 1x repeat in 2 hr when >2     Status: Normal   Result Value Ref Range    Lactic Acid, Initial 1.4 0.7 - 2.0 mmol/L   CBC with platelets and differential     Status: None   Result Value Ref Range    WBC Count 8.9 4.0 - 11.0 10e3/uL    RBC Count 4.88 4.40 - 5.90 10e6/uL    Hemoglobin 14.3 13.3 - 17.7 g/dL    Hematocrit 42.8 40.0 - 53.0 %    MCV 88 78 - 100 fL    MCH 29.3 26.5 - 33.0 pg    MCHC 33.4 31.5 - 36.5 g/dL    RDW 13.1 10.0 - 15.0 %    Platelet Count 215 150 - 450 10e3/uL    % Neutrophils 79 %    % Lymphocytes 14 %    % Monocytes 7 %    % Eosinophils 0 %    % Basophils  0 %    % Immature Granulocytes 0 %    NRBCs per 100 WBC 0 <1 /100    Absolute Neutrophils 7.0 1.6 - 8.3 10e3/uL    Absolute Lymphocytes 1.3 0.8 - 5.3 10e3/uL    Absolute Monocytes 0.6 0.0 - 1.3 10e3/uL    Absolute Eosinophils 0.0 0.0 - 0.7 10e3/uL    Absolute Basophils 0.0 0.0 - 0.2 10e3/uL    Absolute Immature Granulocytes 0.0 <=0.4 10e3/uL    Absolute NRBCs 0.0 10e3/uL   UA with Microscopic reflex to Culture     Status: Abnormal    Specimen: Urine, Midstream   Result Value Ref Range    Color Urine Light Yellow Colorless, Straw, Light Yellow, Yellow    Appearance Urine Clear Clear    Glucose Urine 100 (A) Negative mg/dL    Bilirubin Urine Negative Negative    Ketones Urine Negative Negative mg/dL    Specific Gravity Urine 1.015 1.003 - 1.035    Blood Urine Trace (A) Negative    pH Urine 6.0 5.0 - 7.0    Protein Albumin Urine 100 (A) Negative mg/dL    Urobilinogen Urine Normal Normal, 2.0 mg/dL    Nitrite Urine Negative Negative    Leukocyte Esterase Urine Negative Negative    Mucus Urine Present (A) None Seen /LPF    RBC Urine 1 <=2 /HPF    WBC Urine <1 <=5 /HPF    Squamous Epithelials Urine <1 <=1 /HPF    Narrative    Urine Culture not indicated   Troponin T, High Sensitivity     Status: Abnormal   Result Value Ref Range    Troponin T, High Sensitivity 50 (H) <=22 ng/L   EKG 12 lead     Status: None (Preliminary result)   Result Value Ref Range    Systolic Blood Pressure  mmHg    Diastolic Blood Pressure  mmHg    Ventricular Rate 77 BPM    Atrial Rate  BPM    CA Interval  ms    QRS Duration 98 ms     ms    QTc 439 ms    P Axis  degrees    R AXIS -30 degrees    T Axis 143 degrees    Interpretation ECG       Atrial fibrillation  Left axis deviation  Moderate voltage criteria for LVH, may be normal variant ( Sokolow-Allen , Evan product )  ST & T wave abnormality, consider lateral ischemia  Abnormal ECG     CBC with platelets differential     Status: None    Narrative    The following orders were created for  panel order CBC with platelets differential.  Procedure                               Abnormality         Status                     ---------                               -----------         ------                     CBC with platelets and d...[[id number]]                      Final result                 Please view results for these tests on the individual orders.     Medications   ondansetron (ZOFRAN) injection 4 mg (has no administration in time range)   HYDROmorphone (PF) (DILAUDID) injection 0.5 mg (0.5 mg Intravenous $Given 1/20/25 1937)   sodium chloride 0.9% BOLUS 1,000 mL (0 mLs Intravenous Stopped 1/20/25 2156)   iopamidol (ISOVUE-370) solution 122 mL (122 mLs Intravenous $Given 1/20/25 2244)   sodium chloride (PF) 0.9% PF flush 80 mL (80 mLs Intravenous $Given 1/20/25 2244)   alum & mag hydroxide-simethicone (MAALOX) suspension 30 mL (30 mLs Oral $Given 1/21/25 0013)   famotidine (PEPCID) injection 20 mg (20 mg Intravenous $Given 1/21/25 0013)     Labs Ordered and Resulted from Time of ED Arrival to Time of ED Departure   COMPREHENSIVE METABOLIC PANEL - Abnormal       Result Value    Sodium 134 (*)     Potassium 4.3      Carbon Dioxide (CO2) 24      Anion Gap 8      Urea Nitrogen 29.2 (*)     Creatinine 1.15      GFR Estimate 68      Calcium 8.7 (*)     Chloride 102      Glucose 296 (*)     Alkaline Phosphatase 111      AST 18      ALT 15      Protein Total 5.8 (*)     Albumin 3.2 (*)     Bilirubin Total 0.5     ROUTINE UA WITH MICROSCOPIC REFLEX TO CULTURE - Abnormal    Color Urine Light Yellow      Appearance Urine Clear      Glucose Urine 100 (*)     Bilirubin Urine Negative      Ketones Urine Negative      Specific Gravity Urine 1.015      Blood Urine Trace (*)     pH Urine 6.0      Protein Albumin Urine 100 (*)     Urobilinogen Urine Normal      Nitrite Urine Negative      Leukocyte Esterase Urine Negative      Mucus Urine Present (*)     RBC Urine 1      WBC Urine <1      Squamous Epithelials  "Urine <1     TROPONIN T, HIGH SENSITIVITY - Abnormal    Troponin T, High Sensitivity 50 (*)    LIPASE - Normal    Lipase 15     LACTIC ACID WHOLE BLOOD WITH 1X REPEAT IN 2 HR WHEN >2 - Normal    Lactic Acid, Initial 1.4     CBC WITH PLATELETS AND DIFFERENTIAL    WBC Count 8.9      RBC Count 4.88      Hemoglobin 14.3      Hematocrit 42.8      MCV 88      MCH 29.3      MCHC 33.4      RDW 13.1      Platelet Count 215      % Neutrophils 79      % Lymphocytes 14      % Monocytes 7      % Eosinophils 0      % Basophils 0      % Immature Granulocytes 0      NRBCs per 100 WBC 0      Absolute Neutrophils 7.0      Absolute Lymphocytes 1.3      Absolute Monocytes 0.6      Absolute Eosinophils 0.0      Absolute Basophils 0.0      Absolute Immature Granulocytes 0.0      Absolute NRBCs 0.0     TROPONIN T, HIGH SENSITIVITY     CT Abdomen Pelvis w Contrast   Final Result   IMPRESSION:    1.  No acute process identified in the abdomen or pelvis.   2.  Small right and trace left pleural effusions.   3.  Coronary artery calcification, particularly of the left anterior descending coronary artery.   4.  Stable postoperative changes of liver transplantation.   5.  Postcholecystectomy.   6.  Chronic cortical scarring of both kidneys.               EKG at 0109.    Abdominal pain at time of ECG.  ECG interpretted by me within 10 minutes of production  77M bpm, afib  Left axis.  Normal intervals exc afib  Nl R-wave progress. No ST-T elevations or depressions. Pathologic T-wave inversion are absent, non specific precordial t-wave flattening in V4-V6    Interpretation: Abn ECG  no significant changes c/w prior         Medical Decision Making Matrix    Problems Addressed {MDM Low:395657::\"1 acute uncomplicated illness or injury\"} {MDM Moderate:020819} MDM High: 1 acute or chronic illness or injury that poses a threat to life or bodily function     Data   Considered {Data Limited:483374::\"Order of (or considering) each unique test (Cat " "1)\",\"Review of the results of each unique test (Cat 1)\"} {Data Moderate:127714::\"Order of (or considering) each unique test (Cat 1)\",\"Review of the results of each unique test (Cat 1)\"} Data Extensive: Order of (or considering) each unique test (Cat 1), Review of the results of each unique test (Cat 1), and Independent interpretation of a test performed by another practitioner (Cat 2)     Risk of Patient Management {Low Risk:358689::\"Low Risk\"}     {Moderate Risk:020597} {High Risk:225896}           Assessment & Plan    69-year-old male with history as noted significant for liver transplant immunocompromised presenting with 2 days of worsening abdominal pain    Otherwise well no URI symptoms.  Nausea without vomiting.  No abdominal distention on exam.  Tender diffusely.  IV established and labs sent  Laboratories overall unrevealing, lactic acid 1.4, LFTs wnl. TB0.5    CT abdomen pelvis shows without acute findings     Prior to intervention pain significantly, spontaneously improved. GIven Maalox and pepcid, which patient further.    On further evaluation patient has been having intermittent pain.  Although occasionally pain goes down completely to 0 then get some shooting pain which resolves.  Will also consider ACS as patient has had coronary artery disease although presentation quite atypical.  ECG shows no evidence of acute ischemia.  The first troponin is 50.  Will wait for reevaluation and serial troponin.    Patient signed out to the overnight attending.    I have reviewed the nursing notes. I have reviewed the findings, diagnosis, plan and need for follow up with the patient.    New Prescriptions    No medications on file       Final diagnoses:   Left upper quadrant abdominal pain       Jaswinder Navarro MD***  [facility] EMERGENCY DEPARTMENT  January 20, 2025   " coronary artery disease although presentation quite atypical.  ECG shows no evidence of acute ischemia.  The first troponin is 50.  Will wait for reevaluation and serial troponin.    Patient signed out to the overnight attending.    I have reviewed the nursing notes. I have reviewed the findings, diagnosis, plan and need for follow up with the patient.    Discharge Medication List as of 1/21/2025  3:12 AM        START taking these medications    Details   omeprazole (PRILOSEC) 20 MG DR capsule Take 1 capsule (20 mg) by mouth daily., Disp-14 capsule, R-0, Local Print             Final diagnoses:   Left upper quadrant abdominal pain       Jaswinder Navarro MD  [facility] EMERGENCY DEPARTMENT  January 20, 2025      Jaswinder Navarro MD  01/27/25 [id number]

## 2025-01-22 LAB — CV ZIO PRELIM RESULTS: NORMAL

## 2025-01-31 ENCOUNTER — LAB (OUTPATIENT)
Dept: LAB | Facility: CLINIC | Age: 70
End: 2025-01-31
Attending: STUDENT IN AN ORGANIZED HEALTH CARE EDUCATION/TRAINING PROGRAM
Payer: COMMERCIAL

## 2025-01-31 DIAGNOSIS — C61 PROSTATE CANCER (H): ICD-10-CM

## 2025-01-31 LAB — PSA SERPL DL<=0.01 NG/ML-MCNC: <0.01 NG/ML (ref 0–6.5)

## 2025-01-31 PROCEDURE — 36415 COLL VENOUS BLD VENIPUNCTURE: CPT | Performed by: PATHOLOGY

## 2025-01-31 PROCEDURE — 84153 ASSAY OF PSA TOTAL: CPT | Performed by: PATHOLOGY

## 2025-02-11 NOTE — PROGRESS NOTES
Patient is showing 3/5 MNCM met. BP out range   02/11/25 11:19 AM  PATIENT LAB/IMAGING STATUS : Orders completed / No further action needed   Outcome for 02/11/25 11:19 AM: [employer] message sent  Outcome for 02/17/25 10:23 AM: [employer] message sent

## 2025-02-17 NOTE — PROGRESS NOTES
02/17/25 11:10 AM : Appointment reminder phone call made to patient. No Answer. LVM Advising pt to arrive 15 mins early and and to bring glucometer  Outcome for 02/17/25 11:10 AM: Left Voicemail

## 2025-02-18 ENCOUNTER — OFFICE VISIT (OUTPATIENT)
Dept: ENDOCRINOLOGY | Facility: CLINIC | Age: 70
End: 2025-02-18
Payer: COMMERCIAL

## 2025-02-18 VITALS
DIASTOLIC BLOOD PRESSURE: 97 MMHG | HEART RATE: 84 BPM | OXYGEN SATURATION: 97 % | WEIGHT: 198 LBS | SYSTOLIC BLOOD PRESSURE: 165 MMHG | BODY MASS INDEX: 31 KG/M2

## 2025-02-18 DIAGNOSIS — R00.0 TACHYCARDIA: ICD-10-CM

## 2025-02-18 DIAGNOSIS — I10 PRIMARY HYPERTENSION: ICD-10-CM

## 2025-02-18 DIAGNOSIS — E03.8 OTHER SPECIFIED HYPOTHYROIDISM: ICD-10-CM

## 2025-02-18 DIAGNOSIS — J06.9 VIRAL UPPER RESPIRATORY TRACT INFECTION: ICD-10-CM

## 2025-02-18 DIAGNOSIS — E11.9 DM TYPE 2, GOAL HBA1C 7%-8% (H): ICD-10-CM

## 2025-02-18 DIAGNOSIS — I48.91 ATRIAL FIBRILLATION WITH RAPID VENTRICULAR RESPONSE (H): ICD-10-CM

## 2025-02-18 DIAGNOSIS — Z94.2 LUNG REPLACED BY TRANSPLANT (H): ICD-10-CM

## 2025-02-18 DIAGNOSIS — I10 UNCONTROLLED HYPERTENSION: Primary | ICD-10-CM

## 2025-02-18 LAB
EST. AVERAGE GLUCOSE BLD GHB EST-MCNC: 194 MG/DL
HBA1C MFR BLD: 8.4 %

## 2025-02-18 PROCEDURE — 83036 HEMOGLOBIN GLYCOSYLATED A1C: CPT | Performed by: PATHOLOGY

## 2025-02-18 PROCEDURE — 99215 OFFICE O/P EST HI 40 MIN: CPT | Performed by: PHYSICIAN ASSISTANT

## 2025-02-18 RX ORDER — ATORVASTATIN CALCIUM 40 MG/1
40 TABLET, FILM COATED ORAL DAILY
Qty: 90 TABLET | Refills: 2 | Status: SHIPPED | OUTPATIENT
Start: 2025-02-18

## 2025-02-18 RX ORDER — DILTIAZEM HYDROCHLORIDE 240 MG/1
240 CAPSULE, COATED, EXTENDED RELEASE ORAL DAILY
Qty: 90 CAPSULE | Refills: 3 | Status: SHIPPED | OUTPATIENT
Start: 2025-02-18

## 2025-02-18 RX ORDER — HYDRALAZINE HYDROCHLORIDE 25 MG/1
25 TABLET, FILM COATED ORAL 2 TIMES DAILY
Qty: 180 TABLET | Refills: 3 | Status: SHIPPED | OUTPATIENT
Start: 2025-02-18

## 2025-02-18 RX ORDER — GUAIFENESIN 600 MG/1
1200 TABLET, EXTENDED RELEASE ORAL 2 TIMES DAILY
Qty: 20 TABLET | Refills: 1 | Status: SHIPPED | OUTPATIENT
Start: 2025-02-18

## 2025-02-18 RX ORDER — LEVOTHYROXINE SODIUM 150 UG/1
150 TABLET ORAL DAILY
Qty: 90 TABLET | Refills: 3 | Status: SHIPPED | OUTPATIENT
Start: 2025-02-18

## 2025-02-18 ASSESSMENT — PAIN SCALES - GENERAL: PAINLEVEL_OUTOF10: MODERATE PAIN (5)

## 2025-02-18 NOTE — Clinical Note
Check labs including A1C. Recheck 6m  Orders:  •  Hemoglobin A1C; Future   Contrast risk dose (3.7 * GFR)    333 mL for 100%, 250 mL for 75% -- GFR 90

## 2025-02-18 NOTE — LETTER
2/18/2025       RE: Loy Limon  [address]  [facility] [address]     Dear Colleague,    Thank you for referring your patient, Loy Limon, to the [facility] [place] at [facility]. Please see a copy of my visit note below.    Patient is showing 3/5 MNCM met. BP out range   02/11/25 11:19 AM  PATIENT LAB/IMAGING STATUS : Orders completed / No further action needed   Outcome for 02/11/25 11:19 AM: [email or web address] message sent  Outcome for 02/17/25 10:23 AM: [email or web address] message sent      02/17/25 11:10 AM : Appointment reminder phone call made to patient. No Answer. LVM Advising pt to arrive 15 mins early and and to bring glucometer  Outcome for 02/17/25 11:10 AM: Left Voicemail       Endocrinology and Diabetes Clinic Return Visit    Subjective:   Loy Limon was seen today for a follow up visit for post-transplant diabetes mellitus and hypothyroidism.  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    He has no past medical history of Basal cell carcinoma, Malignant melanoma (H), or Squamous cell carcinoma of skin, unspecified.  A  over the phone assisted with this visit.     He was last seen in clinic by Dr. Sue Tavares in September 2024.  At that point in time he had discontinued due to a urinary tract infection.  He was taking metformin and intermittently taking glargine 20 units at bedtime but not taking if his blood sugar was at goal.  He was advised to take his glargine and  metformin on a daily basis.  His most recent A1c at that time was 10.6.  His blood pressure was elevated.    Interval history:   Did not okay Notes reviewed he has not updated his A1c until today.  He was evaluated for chest pain in the emergency room in January 20.  EKG troponin trending and CT of the abdomen and pelvis were performed.  His blood pressure was quite elevated at 174/98.  His antihypertensive regimen was not changed omeprazole was added.      Post transplant diabetes mellitus  He has history of cirrhosis with HCC and underwent living donor liver transplant on 12/22/2018. He developed steroid/immunosuppressant induced diabetes and was treated with NPH insulin, which was discontinued once he was no longer taking steroids.    Today:  Jennie reports that his blood pressure is nearly always high ever since he first came to our clinics.  At home he reports that when he first gets home from work his blood pressure at home is higher about once he rests an hour later his blood pressure usually will come down to 140 or 138/85 or 90 but not always today his blood pressure at rest is rechecked and remains elevated at 170/84.  He is not sure about what blood pressure medications he has at home.  According to dispense report he should be on just about his last tablet of diltiazem but has not filled hydralazine for 4 months.  Metoprolol was previously on his med list but has not been filled for several years at this point though I do not find any note stating that it should or why it would not have been discontinued.  He notes that he has been told to follow-up with his primary care but they never make an appointment for him though he has asked he has not called them.    He lives alone with his wife and he will manages his medications.        Current treatment strategy:   Glargine 20 units at bedtime - not taking regularly (for example, if he is feeling good or if blood glucose is at goal, he would skip -though  we note that he is only checked his blood sugar once in the evening in the last week.  He is surprised by this.  He notes he takes his long-acting Lantus generally about 3 times per week.    Metformin 1000 mg twice daily     Again for blood pressure it appears he should have some diltiazem at home but his hydralazine should be gone if he was taking it daily.     Prednisone dose is 5 mg daily     Blood glucose monitoring:   He checks finger stick blood glucose. However he ran out of test strips about 2 months ago so has not been checking.     Diabetes care and complications:  Retinopathy: None. Due for eye exam.   Nephropathy: CKD with +urine albumin.   Neuropathy: Yes, peripheral neuropathy with numbness/paraesthesia balls of feet   BP: Borderline control   Lipids: Statin prescribed     Medications:   Current Outpatient Medications   Medication Sig Dispense Refill     albuterol (PROAIR HFA/PROVENTIL HFA/VENTOLIN HFA) 108 (90 Base) MCG/ACT inhaler Inhale 2 puffs into the lungs every 6 hours as needed for shortness of breath, wheezing or cough. 18 g 11     Alcohol Swabs PADS Use to swab the area of the injection or fam as directed Per insurance coverage 200 each 1     apixaban ANTICOAGULANT (ELIQUIS) 5 MG tablet Take 1 tablet (5 mg) by mouth 2 times daily 60 tablet 1     atorvastatin (LIPITOR) 40 MG tablet Take 1 tablet (40 mg) by mouth daily. 90 tablet 2     blood glucose (NO BRAND SPECIFIED) lancets standard Use to test blood sugar 2 times daily or as directed. 200 each 3     blood glucose (NO BRAND SPECIFIED) test strip Use to test blood sugar 2 times daily as directed. 200 strip 3     blood glucose monitoring (NO BRAND SPECIFIED) meter device kit Use to test blood sugar 2 times daily or as directed. 1 kit 0     diltiazem ER COATED BEADS (CARDIZEM CD/CARTIA XT) 240 MG 24 hr capsule Take 1 capsule (240 mg) by mouth daily. 90 capsule 3     fluticasone (FLONASE) 50 MCG/ACT nasal spray Spray 1 spray into both  nostrils daily. 16 g 5     hydrALAZINE (APRESOLINE) 25 MG tablet Take 1 tablet (25 mg) by mouth 2 times daily. 180 tablet 3     insulin glargine (LANTUS SOLOSTAR) 100 UNIT/ML pen Inject 20 Units Subcutaneous at bedtime 15 mL 1     insulin pen needle (31G X 5 MM) 31G X 5 MM miscellaneous Use one  pen needles daily or as directed. 100 each 3     levothyroxine (SYNTHROID/LEVOTHROID) 150 MCG tablet Take 1 tablet (150 mcg) by mouth daily. 90 tablet 3     metFORMIN (GLUCOPHAGE XR) 500 MG 24 hr tablet Take 2 tablets (1,000 mg) by mouth 2 times daily (with meals). 360 tablet 3     mycophenolate (GENERIC EQUIVALENT) 500 MG tablet Take 1 tablet (500 mg) by mouth 2 times daily 180 tablet 3     omeprazole (PRILOSEC) 20 MG DR capsule Take 1 capsule (20 mg) by mouth daily. 14 capsule 0     predniSONE (DELTASONE) 5 MG tablet Take 1 tablet (5 mg) by mouth daily 90 tablet 1     ursodiol (ACTIGALL) 250 MG tablet Take 1 tablet (250 mg) by mouth 2 times daily 180 tablet 3     Vitamin D3 (CHOLECALCIFEROL) 25 mcg (1000 units) tablet Take 2 tablets (50 mcg) by mouth daily 180 tablet 3       Social History     Tobacco Use     Smoking status: Former     Current packs/day: 0.00     Average packs/day: (1.4 ttl pk-yrs)     Types: Cigarettes, Cigars     Start date: 1970     Quit date: 3/14/2018     Years since quittin.9     Smokeless tobacco: Never     Tobacco comments:     Quit smoking 2018, one cigarette after dinner   Substance Use Topics     Alcohol use: No     Comment: Quit drinking 2018       Physical Examination:  Wt Readings from Last 4 Encounters:   25 89.8 kg (198 lb)   25 90.3 kg (199 lb)   10/30/24 89.5 kg (197 lb 4.8 oz)   24 89.1 kg (196 lb 6.4 oz)     BP Readings from Last 3 Encounters:   25 (!) 174/98   25 (!) 158/99   25 (!) 159/80     There were no vitals taken for this visit.    General: Well appearing and in no distress.   Eyes: EOMI. Sclerae and conjunctivae are clear.     HENT: No thyromegaly or mass.    Lymphatic: No cervical lymphadenopathy.  Cardiovascular: RRR, with normal S1+S2 and no murmurs.   Respiratory: Lungs are clear to auscultation.   Gastrointestinal: Abdomen is soft, non tender, and non-distended.   Extremities: Mild peripheral edema. Feet are warm and well perfused. No ulcers.       Labs and Studies:   Recent Labs   Lab Test 02/18/25  0810 01/20/25  1905 01/07/25  1556 01/07/25  1554 09/20/24  1524 09/20/24  1516 09/20/24  1415 08/13/24  1509 05/21/24  1633 05/02/24  1348 02/06/24  1221 10/13/23  1137 07/19/23  1746 07/19/23  1411 03/24/21  0812 02/12/21  0000   A1C 8.4*  --   --   --   --   --  10.6*  --   --   --    < >  --    < >  --    < >  --    HEMOGLOBINA1  --   --   --   --   --   --   --   --   --   --   --  8.1  --   --   --  12.1*   TSH  --   --   --  1.79  --  51.29*  --   --   --  7.75*   < >  --    < >  --    < >  --    T4  --   --   --   --   --  0.54*  --   --   --  0.96   < >  --   --   --    < >  --    LDL  --   --   --   --   --   --   --  53  --   --   --   --   --  58   < >  --    HDL  --   --   --   --   --   --   --  73  --   --   --   --   --  61   < >  --    TRIG  --   --   --   --   --   --   --  102  --   --   --   --   --  170*   < >  --    CR  --  1.15  --  1.28*  --  1.23*  --  1.06   < > 0.97   < >  --    < > 1.00   < >  --    MICROL  --   --  3,911.0  --  >4,400.0  --   --   --   --   --   --   --   --   --    < >  --     < > = values in this interval not displayed.     FIB-4 Calculation: 1.53 at 1/20/2025  7:05 PM  Calculated from:  SGOT/AST: 18 U/L at 1/20/2025  7:05 PM  SGPT/ALT: 15 U/L at 1/20/2025  7:05 PM  Platelets: 215 10e3/uL at 1/20/2025  7:05 PM  Age: 69 years    GFR Estimate   Date Value Ref Range Status   01/20/2025 68 >60 mL/min/1.73m2 Final     Comment:     eGFR calculated using 2021 CKD-EPI equation.   01/07/2025 60 (L) >60 mL/min/1.73m2 Final     Comment:     eGFR calculated using 2021 CKD-EPI equation.   09/20/2024  63 >60 mL/min/1.73m2 Final     Comment:     eGFR calculated using 2021 CKD-EPI equation.   07/07/2021 >90 >60 mL/min/[1.73_m2] Final     Comment:     Non  GFR Calc  Starting 12/18/2018, serum creatinine based estimated GFR (eGFR) will be   calculated using the Chronic Kidney Disease Epidemiology Collaboration   (CKD-EPI) equation.     04/23/2021 >90 >60 mL/min/[1.73_m2] Final     Comment:     Non  GFR Calc  Starting 12/18/2018, serum creatinine based estimated GFR (eGFR) will be   calculated using the Chronic Kidney Disease Epidemiology Collaboration   (CKD-EPI) equation.     03/24/2021 89 >60 mL/min/[1.73_m2] Final     Comment:     Non  GFR Calc  Starting 12/18/2018, serum creatinine based estimated GFR (eGFR) will be   calculated using the Chronic Kidney Disease Epidemiology Collaboration   (CKD-EPI) equation.               Assessment:  Post transplant diabetes mellitus.    A. Mild peripheral neuropathy, managed with gabapentin. (He reports numbness in toes since prior to the diabetes diagnosis).    B. Elevated urine albumin and reduced eGFR. He saw nephrology in the past, has been referred to their clinic again by his PCP.   2. Hypothyroidism treated with levothyroxine. Not currently taking this medication, and TSH is high.   3. S/p liver transplant.   4. Prostate cancer s/p prostatectomy.   5. Hypertension. Unclear to me which medications for BP he is currently taking   6. CAD s/p stent.     Plan:   - Continue current medications for diabetes - glargine 20 units daily and metformin.  Provided education today that pancreas does have a decreasing ability to produce insulin over time and that providing insulin will support ongoing insulin production for a longer period of time.  He expresses gratitude for this explanation and states that he will try to take it more regularly, if his evening blood sugar is less than 130 he agrees to take 10 or 15 units and let us know  if he has any difficulties with low blood sugars.     -He denies current troubles with low blood sugar.  -Education about high blood pressure and needing to schedule visits with primary care.  He is advised to schedule a visit with primary care.  We wrote out instructions for which blood pressure medications he should be taking and renewed them so that he can pick them up at his pharmacy he understands that they will be for blood pressure.   Ideally would like him to transition to an ACE or ARB medication if this were possible as he does have significant microalbuminuria.  - Labs today.     Follow up in 3 months with myself or Dr. Tavares.     45 minutes spent on the date of the encounter doing chart review, history and exam, documentation and further activities per the note.     It is my privilege to be involved in the care of the above patient.     Leia Edward PA-C, MPAS  Diabetes, Endocrinology, and Metabolism  [phone] Appointments/Nurse Line  [phone] Fax  [phone] office  [email or web address]                     Date Time Reading Time  Reading    2/18 7am 128     2/17 1pm 262 7am 161   2/16 9am 158     2/15 7am 126     2/14 7am 127     2/13 7am 147     2/12 7am 112     Again, thank you for allowing me to participate in the care of your patient.      Sincerely,    Leia Edward PA-C

## 2025-02-18 NOTE — PROGRESS NOTES
Date Time Reading Time  Reading    2/18 7am 128     2/17 1pm 262 7am 161   2/16 9am 158     2/15 7am 126     2/14 7am 127     2/13 7am 147     2/12 7am 112

## 2025-02-18 NOTE — PROGRESS NOTES
Endocrinology and Diabetes Clinic Return Visit    Subjective:   Loy Limon was seen today for a follow up visit for post-transplant diabetes mellitus and hypothyroidism.  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    He has no past medical history of Basal cell carcinoma, Malignant melanoma (H), or Squamous cell carcinoma of skin, unspecified.  A  over the phone assisted with this visit.     He was last seen in clinic by Dr. Sue Tavares in September 2024.  At that point in time he had discontinued due to a urinary tract infection.  He was taking metformin and intermittently taking glargine 20 units at bedtime but not taking if his blood sugar was at goal.  He was advised to take his glargine and metformin on a daily basis.  His most recent A1c at that time was 10.6.  His blood pressure was elevated.    Interval history:   Did not okay Notes reviewed he has not updated his A1c until today.  He was evaluated for chest pain in the emergency room in January 20.  EKG troponin trending and CT of the abdomen and pelvis were performed.  His blood pressure was quite elevated at 174/98.  His antihypertensive regimen was not changed omeprazole was added.      Post transplant diabetes mellitus  He has history of cirrhosis with HCC and underwent living donor liver transplant on 12/22/2018. He developed steroid/immunosuppressant induced diabetes and was treated with NPH insulin, which was discontinued once he was no longer taking steroids.    Today:  Jennie reports that his  blood pressure is nearly always high ever since he first came to our clinics.  At home he reports that when he first gets home from work his blood pressure at home is higher about once he rests an hour later his blood pressure usually will come down to 140 or 138/85 or 90 but not always today his blood pressure at rest is rechecked and remains elevated at 170/84.  He is not sure about what blood pressure medications he has at home.  According to dispense report he should be on just about his last tablet of diltiazem but has not filled hydralazine for 4 months.  Metoprolol was previously on his med list but has not been filled for several years at this point though I do not find any note stating that it should or why it would not have been discontinued.  He notes that he has been told to follow-up with his primary care but they never make an appointment for him though he has asked he has not called them.    He lives alone with his wife and he will manages his medications.        Current treatment strategy:   Glargine 20 units at bedtime - not taking regularly (for example, if he is feeling good or if blood glucose is at goal, he would skip -though we note that he is only checked his blood sugar once in the evening in the last week.  He is surprised by this.  He notes he takes his long-acting Lantus generally about 3 times per week.    Metformin 1000 mg twice daily     Again for blood pressure it appears he should have some diltiazem at home but his hydralazine should be gone if he was taking it daily.     Prednisone dose is 5 mg daily     Blood glucose monitoring:   He checks finger stick blood glucose. However he ran out of test strips about 2 months ago so has not been checking.     Diabetes care and complications:  Retinopathy: None. Due for eye exam.   Nephropathy: CKD with +urine albumin.   Neuropathy: Yes, peripheral neuropathy with numbness/paraesthesia balls of feet   BP: Borderline control   Lipids: Statin  prescribed     Medications:   Current Outpatient Medications   Medication Sig Dispense Refill    albuterol (PROAIR HFA/PROVENTIL HFA/VENTOLIN HFA) 108 (90 Base) MCG/ACT inhaler Inhale 2 puffs into the lungs every 6 hours as needed for shortness of breath, wheezing or cough. 18 g 11    Alcohol Swabs PADS Use to swab the area of the injection or fam as directed Per insurance coverage 200 each 1    apixaban ANTICOAGULANT (ELIQUIS) 5 MG tablet Take 1 tablet (5 mg) by mouth 2 times daily 60 tablet 1    atorvastatin (LIPITOR) 40 MG tablet Take 1 tablet (40 mg) by mouth daily. 90 tablet 2    blood glucose (NO BRAND SPECIFIED) lancets standard Use to test blood sugar 2 times daily or as directed. 200 each 3    blood glucose (NO BRAND SPECIFIED) test strip Use to test blood sugar 2 times daily as directed. 200 strip 3    blood glucose monitoring (NO BRAND SPECIFIED) meter device kit Use to test blood sugar 2 times daily or as directed. 1 kit 0    diltiazem ER COATED BEADS (CARDIZEM CD/CARTIA XT) 240 MG 24 hr capsule Take 1 capsule (240 mg) by mouth daily. 90 capsule 3    fluticasone (FLONASE) 50 MCG/ACT nasal spray Spray 1 spray into both nostrils daily. 16 g 5    hydrALAZINE (APRESOLINE) 25 MG tablet Take 1 tablet (25 mg) by mouth 2 times daily. 180 tablet 3    insulin glargine (LANTUS SOLOSTAR) 100 UNIT/ML pen Inject 20 Units Subcutaneous at bedtime 15 mL 1    insulin pen needle (31G X 5 MM) 31G X 5 MM miscellaneous Use one  pen needles daily or as directed. 100 each 3    levothyroxine (SYNTHROID/LEVOTHROID) 150 MCG tablet Take 1 tablet (150 mcg) by mouth daily. 90 tablet 3    metFORMIN (GLUCOPHAGE XR) 500 MG 24 hr tablet Take 2 tablets (1,000 mg) by mouth 2 times daily (with meals). 360 tablet 3    mycophenolate (GENERIC EQUIVALENT) 500 MG tablet Take 1 tablet (500 mg) by mouth 2 times daily 180 tablet 3    omeprazole (PRILOSEC) 20 MG DR capsule Take 1 capsule (20 mg) by mouth daily. 14 capsule 0    predniSONE  (DELTASONE) 5 MG tablet Take 1 tablet (5 mg) by mouth daily 90 tablet 1    ursodiol (ACTIGALL) 250 MG tablet Take 1 tablet (250 mg) by mouth 2 times daily 180 tablet 3    Vitamin D3 (CHOLECALCIFEROL) 25 mcg (1000 units) tablet Take 2 tablets (50 mcg) by mouth daily 180 tablet 3       Social History     Tobacco Use    Smoking status: Former     Current packs/day: 0.00     Average packs/day: (1.4 ttl pk-yrs)     Types: Cigarettes, Cigars     Start date: 1970     Quit date: 3/14/2018     Years since quittin.9    Smokeless tobacco: Never    Tobacco comments:     Quit smoking 2018, one cigarette after dinner   Substance Use Topics    Alcohol use: No     Comment: Quit drinking 2018       Physical Examination:  Wt Readings from Last 4 Encounters:   25 89.8 kg (198 lb)   25 90.3 kg (199 lb)   10/30/24 89.5 kg (197 lb 4.8 oz)   24 89.1 kg (196 lb 6.4 oz)     BP Readings from Last 3 Encounters:   25 (!) 174/98   25 (!) 158/99   25 (!) 159/80     There were no vitals taken for this visit.    General: Well appearing and in no distress.   Eyes: EOMI. Sclerae and conjunctivae are clear.    HENT: No thyromegaly or mass.    Lymphatic: No cervical lymphadenopathy.  Cardiovascular: RRR, with normal S1+S2 and no murmurs.   Respiratory: Lungs are clear to auscultation.   Gastrointestinal: Abdomen is soft, non tender, and non-distended.   Extremities: Mild peripheral edema. Feet are warm and well perfused. No ulcers.       Labs and Studies:   Recent Labs   Lab Test 25  0810 25  1905 25  1556 25  1554 24  1524 24  1516 24  1415 24  1509 24  1633 24  1348 24  1221 10/13/23  1137 23  1746 23  1411 21  0812 21  0000   A1C 8.4*  --   --   --   --   --  10.6*  --   --   --    < >  --    < >  --    < >  --    HEMOGLOBINA1  --   --   --   --   --   --   --   --   --   --   --  8.1  --   --   --  12.1*    TSH  --   --   --  1.79  --  51.29*  --   --   --  7.75*   < >  --    < >  --    < >  --    T4  --   --   --   --   --  0.54*  --   --   --  0.96   < >  --   --   --    < >  --    LDL  --   --   --   --   --   --   --  53  --   --   --   --   --  58   < >  --    HDL  --   --   --   --   --   --   --  73  --   --   --   --   --  61   < >  --    TRIG  --   --   --   --   --   --   --  102  --   --   --   --   --  170*   < >  --    CR  --  1.15  --  1.28*  --  1.23*  --  1.06   < > 0.97   < >  --    < > 1.00   < >  --    MICROL  --   --  3,911.0  --  >4,400.0  --   --   --   --   --   --   --   --   --    < >  --     < > = values in this interval not displayed.     FIB-4 Calculation: 1.53 at 1/20/2025  7:05 PM  Calculated from:  SGOT/AST: 18 U/L at 1/20/2025  7:05 PM  SGPT/ALT: 15 U/L at 1/20/2025  7:05 PM  Platelets: 215 10e3/uL at 1/20/2025  7:05 PM  Age: 69 years    GFR Estimate   Date Value Ref Range Status   01/20/2025 68 >60 mL/min/1.73m2 Final     Comment:     eGFR calculated using 2021 CKD-EPI equation.   01/07/2025 60 (L) >60 mL/min/1.73m2 Final     Comment:     eGFR calculated using 2021 CKD-EPI equation.   09/20/2024 63 >60 mL/min/1.73m2 Final     Comment:     eGFR calculated using 2021 CKD-EPI equation.   07/07/2021 >90 >60 mL/min/[1.73_m2] Final     Comment:     Non  GFR Calc  Starting 12/18/2018, serum creatinine based estimated GFR (eGFR) will be   calculated using the Chronic Kidney Disease Epidemiology Collaboration   (CKD-EPI) equation.     04/23/2021 >90 >60 mL/min/[1.73_m2] Final     Comment:     Non  GFR Calc  Starting 12/18/2018, serum creatinine based estimated GFR (eGFR) will be   calculated using the Chronic Kidney Disease Epidemiology Collaboration   (CKD-EPI) equation.     03/24/2021 89 >60 mL/min/[1.73_m2] Final     Comment:     Non  GFR Calc  Starting 12/18/2018, serum creatinine based estimated GFR (eGFR) will be   calculated using the  Chronic Kidney Disease Epidemiology Collaboration   (CKD-EPI) equation.               Assessment:  Post transplant diabetes mellitus.    A. Mild peripheral neuropathy, managed with gabapentin. (He reports numbness in toes since prior to the diabetes diagnosis).    B. Elevated urine albumin and reduced eGFR. He saw nephrology in the past, has been referred to their clinic again by his PCP.   2. Hypothyroidism treated with levothyroxine. Not currently taking this medication, and TSH is high.   3. S/p liver transplant.   4. Prostate cancer s/p prostatectomy.   5. Hypertension. Unclear to me which medications for BP he is currently taking   6. CAD s/p stent.     Plan:   - Continue current medications for diabetes - glargine 20 units daily and metformin.  Provided education today that pancreas does have a decreasing ability to produce insulin over time and that providing insulin will support ongoing insulin production for a longer period of time.  He expresses gratitude for this explanation and states that he will try to take it more regularly, if his evening blood sugar is less than 130 he agrees to take 10 or 15 units and let us know if he has any difficulties with low blood sugars.     -He denies current troubles with low blood sugar.  -Education about high blood pressure and needing to schedule visits with primary care.  He is advised to schedule a visit with primary care.  We wrote out instructions for which blood pressure medications he should be taking and renewed them so that he can pick them up at his pharmacy he understands that they will be for blood pressure.   Ideally would like him to transition to an ACE or ARB medication if this were possible as he does have significant microalbuminuria.  - Labs today.     Follow up in 3 months with myself or Dr. Tavares.     45 minutes spent on the date of the encounter doing chart review, history and exam, documentation and further activities per the note.     It is my  privilege to be involved in the care of the above patient.     Leia Edward PA-C, MPAS  Diabetes, Endocrinology, and Metabolism  [phone] Appointments/Nurse Line  [phone] Fax  [phone] office  [email or web address]

## 2025-02-18 NOTE — NURSING NOTE
"Chief Complaint   Patient presents with    Diabetes       Vital signs:      BP: (!) 165/97 Pulse: 84     SpO2: 97 %       Weight: 89.8 kg (198 lb)  Estimated body mass index is 31 kg/m  as calculated from the following:    Height as of 1/14/25: 1.702 m (5' 7.01\").    Weight as of this encounter: 89.8 kg (198 lb).        "

## 2025-02-18 NOTE — PATIENT INSTRUCTIONS
Estimado Cristoforo,  [place] GLargine/ Lantus insulina DIARIO - 20 unidades, (o 10 - 15 cuando azucar en la tarde esta <130)  Mas Metformina 1000 mg dos veces al gavin    Para la presion:  Hydralazine 25 mg dos veces al gavin  Dlitiazem 240 mg dyana vez al gavin     It is my privilege to be involved in the care of the above patient.     Leia Edward PA-C, MPAS  [facility]  Diabetes, Endocrinology, and Metabolism  [phone] Appointments/Nurse Line  [phone] Fax      To Page:  Dial [phone]  Enter the messaging ID when prompted ([id number])  That will give the option to leave a callback number.

## 2025-02-28 ENCOUNTER — APPOINTMENT (OUTPATIENT)
Dept: ULTRASOUND IMAGING | Facility: CLINIC | Age: 70
End: 2025-02-28
Attending: FAMILY MEDICINE
Payer: COMMERCIAL

## 2025-02-28 ENCOUNTER — HOSPITAL ENCOUNTER (EMERGENCY)
Facility: CLINIC | Age: 70
Discharge: HOME | End: 2025-02-28
Attending: FAMILY MEDICINE | Admitting: FAMILY MEDICINE
Payer: COMMERCIAL

## 2025-02-28 ENCOUNTER — APPOINTMENT (OUTPATIENT)
Dept: GENERAL RADIOLOGY | Facility: CLINIC | Age: 70
End: 2025-02-28
Attending: FAMILY MEDICINE
Payer: COMMERCIAL

## 2025-02-28 VITALS
HEIGHT: 64 IN | BODY MASS INDEX: 33.8 KG/M2 | RESPIRATION RATE: 16 BRPM | HEART RATE: 89 BPM | TEMPERATURE: 97.8 F | DIASTOLIC BLOOD PRESSURE: 91 MMHG | WEIGHT: 198 LBS | SYSTOLIC BLOOD PRESSURE: 138 MMHG | OXYGEN SATURATION: 97 %

## 2025-02-28 DIAGNOSIS — M79.662 BILATERAL CALF PAIN: ICD-10-CM

## 2025-02-28 DIAGNOSIS — M79.661 BILATERAL CALF PAIN: ICD-10-CM

## 2025-02-28 LAB
ALBUMIN SERPL BCG-MCNC: 3.4 G/DL (ref 3.5–5.2)
ALP SERPL-CCNC: 136 U/L (ref 40–150)
ALT SERPL W P-5'-P-CCNC: 22 U/L (ref 0–70)
ANION GAP SERPL CALCULATED.3IONS-SCNC: 7 MMOL/L (ref 7–15)
APTT PPP: 29 SECONDS (ref 22–38)
AST SERPL W P-5'-P-CCNC: 23 U/L (ref 0–45)
ATRIAL RATE - MUSE: NORMAL BPM
BASOPHILS # BLD AUTO: 0 10E3/UL (ref 0–0.2)
BASOPHILS NFR BLD AUTO: 0 %
BILIRUB SERPL-MCNC: 0.8 MG/DL
BUN SERPL-MCNC: 18.6 MG/DL (ref 8–23)
CALCIUM SERPL-MCNC: 8.7 MG/DL (ref 8.8–10.4)
CHLORIDE SERPL-SCNC: 105 MMOL/L (ref 98–107)
CK SERPL-CCNC: 108 U/L (ref 39–308)
CREAT SERPL-MCNC: 1.12 MG/DL (ref 0.67–1.17)
DIASTOLIC BLOOD PRESSURE - MUSE: NORMAL MMHG
EGFRCR SERPLBLD CKD-EPI 2021: 70 ML/MIN/1.73M2
EOSINOPHIL # BLD AUTO: 0.1 10E3/UL (ref 0–0.7)
EOSINOPHIL NFR BLD AUTO: 1 %
ERYTHROCYTE [DISTWIDTH] IN BLOOD BY AUTOMATED COUNT: 12.8 % (ref 10–15)
GLUCOSE SERPL-MCNC: 199 MG/DL (ref 70–99)
HCO3 SERPL-SCNC: 27 MMOL/L (ref 22–29)
HCT VFR BLD AUTO: 50.7 % (ref 40–53)
HGB BLD-MCNC: 15.9 G/DL (ref 13.3–17.7)
IMM GRANULOCYTES # BLD: 0 10E3/UL
IMM GRANULOCYTES NFR BLD: 0 %
INR PPP: 0.95 (ref 0.85–1.15)
INTERPRETATION ECG - MUSE: NORMAL
LYMPHOCYTES # BLD AUTO: 1.4 10E3/UL (ref 0.8–5.3)
LYMPHOCYTES NFR BLD AUTO: 18 %
MAGNESIUM SERPL-MCNC: 1.8 MG/DL (ref 1.7–2.3)
MCH RBC QN AUTO: 28.4 PG (ref 26.5–33)
MCHC RBC AUTO-ENTMCNC: 31.4 G/DL (ref 31.5–36.5)
MCV RBC AUTO: 91 FL (ref 78–100)
MONOCYTES # BLD AUTO: 0.3 10E3/UL (ref 0–1.3)
MONOCYTES NFR BLD AUTO: 5 %
NEUTROPHILS # BLD AUTO: 5.6 10E3/UL (ref 1.6–8.3)
NEUTROPHILS NFR BLD AUTO: 76 %
NRBC # BLD AUTO: 0 10E3/UL
NRBC BLD AUTO-RTO: 0 /100
P AXIS - MUSE: NORMAL DEGREES
PHOSPHATE SERPL-MCNC: 3.2 MG/DL (ref 2.5–4.5)
PLATELET # BLD AUTO: 235 10E3/UL (ref 150–450)
POTASSIUM SERPL-SCNC: 5 MMOL/L (ref 3.4–5.3)
POTASSIUM SERPL-SCNC: 5.4 MMOL/L (ref 3.4–5.3)
PR INTERVAL - MUSE: NORMAL MS
PROT SERPL-MCNC: 6.3 G/DL (ref 6.4–8.3)
QRS DURATION - MUSE: 84 MS
QT - MUSE: 358 MS
QTC - MUSE: 466 MS
R AXIS - MUSE: -36 DEGREES
RBC # BLD AUTO: 5.59 10E6/UL (ref 4.4–5.9)
SODIUM SERPL-SCNC: 139 MMOL/L (ref 135–145)
SYSTOLIC BLOOD PRESSURE - MUSE: NORMAL MMHG
T AXIS - MUSE: 131 DEGREES
VENTRICULAR RATE- MUSE: 102 BPM
WBC # BLD AUTO: 7.4 10E3/UL (ref 4–11)

## 2025-02-28 PROCEDURE — 96360 HYDRATION IV INFUSION INIT: CPT | Performed by: FAMILY MEDICINE

## 2025-02-28 PROCEDURE — 84100 ASSAY OF PHOSPHORUS: CPT | Performed by: FAMILY MEDICINE

## 2025-02-28 PROCEDURE — 93005 ELECTROCARDIOGRAM TRACING: CPT | Performed by: FAMILY MEDICINE

## 2025-02-28 PROCEDURE — 93970 EXTREMITY STUDY: CPT | Mod: 26 | Performed by: RADIOLOGY

## 2025-02-28 PROCEDURE — 250N000013 HC RX MED GY IP 250 OP 250 PS 637: Performed by: FAMILY MEDICINE

## 2025-02-28 PROCEDURE — 84132 ASSAY OF SERUM POTASSIUM: CPT | Performed by: FAMILY MEDICINE

## 2025-02-28 PROCEDURE — 93010 ELECTROCARDIOGRAM REPORT: CPT | Performed by: FAMILY MEDICINE

## 2025-02-28 PROCEDURE — 93970 EXTREMITY STUDY: CPT

## 2025-02-28 PROCEDURE — 83735 ASSAY OF MAGNESIUM: CPT | Performed by: FAMILY MEDICINE

## 2025-02-28 PROCEDURE — 258N000003 HC RX IP 258 OP 636: Performed by: FAMILY MEDICINE

## 2025-02-28 PROCEDURE — 73590 X-RAY EXAM OF LOWER LEG: CPT | Mod: 26 | Performed by: RADIOLOGY

## 2025-02-28 PROCEDURE — 85730 THROMBOPLASTIN TIME PARTIAL: CPT | Performed by: FAMILY MEDICINE

## 2025-02-28 PROCEDURE — 96361 HYDRATE IV INFUSION ADD-ON: CPT | Performed by: FAMILY MEDICINE

## 2025-02-28 PROCEDURE — 73590 X-RAY EXAM OF LOWER LEG: CPT | Mod: 50

## 2025-02-28 PROCEDURE — 99285 EMERGENCY DEPT VISIT HI MDM: CPT | Mod: 25 | Performed by: FAMILY MEDICINE

## 2025-02-28 PROCEDURE — 82550 ASSAY OF CK (CPK): CPT | Performed by: FAMILY MEDICINE

## 2025-02-28 PROCEDURE — 85610 PROTHROMBIN TIME: CPT | Performed by: FAMILY MEDICINE

## 2025-02-28 PROCEDURE — 85004 AUTOMATED DIFF WBC COUNT: CPT | Performed by: FAMILY MEDICINE

## 2025-02-28 PROCEDURE — 99284 EMERGENCY DEPT VISIT MOD MDM: CPT | Performed by: FAMILY MEDICINE

## 2025-02-28 PROCEDURE — 36415 COLL VENOUS BLD VENIPUNCTURE: CPT | Performed by: FAMILY MEDICINE

## 2025-02-28 RX ORDER — LIDOCAINE 40 MG/G
CREAM TOPICAL
Status: DISCONTINUED | OUTPATIENT
Start: 2025-02-28 | End: 2025-02-28 | Stop reason: HOSPADM

## 2025-02-28 RX ORDER — ACETAMINOPHEN 325 MG/1
650 TABLET ORAL ONCE
Status: COMPLETED | OUTPATIENT
Start: 2025-02-28 | End: 2025-02-28

## 2025-02-28 RX ORDER — OXYCODONE HYDROCHLORIDE 5 MG/1
5 TABLET ORAL EVERY 12 HOURS PRN
Qty: 6 TABLET | Refills: 0 | Status: SHIPPED | OUTPATIENT
Start: 2025-02-28 | End: 2025-03-03

## 2025-02-28 RX ORDER — OXYCODONE HYDROCHLORIDE 5 MG/1
5 TABLET ORAL ONCE
Status: COMPLETED | OUTPATIENT
Start: 2025-02-28 | End: 2025-02-28

## 2025-02-28 RX ADMIN — SODIUM CHLORIDE 1000 ML: 9 INJECTION, SOLUTION INTRAVENOUS at 08:17

## 2025-02-28 RX ADMIN — OXYCODONE HYDROCHLORIDE 5 MG: 5 TABLET ORAL at 12:22

## 2025-02-28 ASSESSMENT — ACTIVITIES OF DAILY LIVING (ADL)
ADLS_ACUITY_SCORE: 59

## 2025-02-28 ASSESSMENT — COLUMBIA-SUICIDE SEVERITY RATING SCALE - C-SSRS
2. HAVE YOU ACTUALLY HAD ANY THOUGHTS OF KILLING YOURSELF IN THE PAST MONTH?: NO
6. HAVE YOU EVER DONE ANYTHING, STARTED TO DO ANYTHING, OR PREPARED TO DO ANYTHING TO END YOUR LIFE?: NO
1. IN THE PAST MONTH, HAVE YOU WISHED YOU WERE DEAD OR WISHED YOU COULD GO TO SLEEP AND NOT WAKE UP?: NO

## 2025-02-28 NOTE — DISCHARGE INSTRUCTIONS
Home.  Your labs look good.  No sign of blood clot or jack concerns by xray.  Concerned about your Lipitor(Atorvastatin) causing muscle pain in your calves.  Hold your Lipitor(Atorvastatin) cholesterol medicine for a week to see if improve.  Push fluids also.  Oxycodone every 12 hours if needed for pain.  Return if changes or concerns.  See MD for a recheck in a week.      Results for orders placed or performed during the hospital encounter of 02/28/25   US Lower Extremity Venous Duplex Bilateral     Status: None    Narrative    EXAMINATION: DOPPLER VENOUS ULTRASOUND OF BILATERAL LOWER EXTREMITIES,  2/28/2025 9:52 AM     COMPARISON: Ultrasound 4/14/2024    HISTORY: bilateral calf pain eval for dvt    TECHNIQUE:  Gray-scale evaluation with compression, spectral flow and  color Doppler assessment of the deep venous system of both legs from  groin to knee, and then at the ankles.    FINDINGS:  In both lower extremities, the common femoral, femoral, popliteal,  peroneal, and posterior tibial veins demonstrate normal  compressibility and blood flow.      Impression    IMPRESSION:  No evidence of deep venous thrombosis in either lower extremity.       I have personally reviewed the examination and initial interpretation  and I agree with the findings.    CHAPARRITA MANRIQUEZ,          SYSTEM ID:  [id number]   XR Tibia and Fibula Bilateral 2 Views     Status: None    Narrative    2 views bilateral tibia/fibula radiographs 2/28/2025 9:03 AM    History: bilateral calf pain    Additional History from EMR: Reported 3 days of bilateral calf pain,  denies trauma    Comparison: None    Findings:    2 AP and 2 cross table lateral views of the bilateral tibia/fibula  were obtained.     No acute osseous abnormality.      Knee and ankle joints are incompletely assessed but grossly congruent.  Scattered moderate degenerative changes. Chronic appearing periosteal  thickening of the posterior mid left tibial shaft.    Vascular  calcifications.        Impression    Impression:  1. No acute osseous abnormality.  2. Moderate degenerative changes.    I have personally reviewed the examination and initial interpretation  and I agree with the findings.    YU VELAZCO MD (Joe)         SYSTEM ID:  [id number]   INR     Status: Normal   Result Value Ref Range    INR 0.95 0.85 - 1.15   Partial thromboplastin time     Status: Normal   Result Value Ref Range    aPTT 29 22 - 38 Seconds   Comprehensive metabolic panel     Status: Abnormal   Result Value Ref Range    Sodium 139 135 - 145 mmol/L    Potassium 5.4 (H) 3.4 - 5.3 mmol/L    Carbon Dioxide (CO2) 27 22 - 29 mmol/L    Anion Gap 7 7 - 15 mmol/L    Urea Nitrogen 18.6 8.0 - 23.0 mg/dL    Creatinine 1.12 0.67 - 1.17 mg/dL    GFR Estimate 70 >60 mL/min/1.73m2    Calcium 8.7 (L) 8.8 - 10.4 mg/dL    Chloride 105 98 - 107 mmol/L    Glucose 199 (H) 70 - 99 mg/dL    Alkaline Phosphatase 136 40 - 150 U/L    AST 23 0 - 45 U/L    ALT 22 0 - 70 U/L    Protein Total 6.3 (L) 6.4 - 8.3 g/dL    Albumin 3.4 (L) 3.5 - 5.2 g/dL    Bilirubin Total 0.8 <=1.2 mg/dL   Magnesium     Status: Normal   Result Value Ref Range    Magnesium 1.8 1.7 - 2.3 mg/dL   Phosphorus     Status: Normal   Result Value Ref Range    Phosphorus 3.2 2.5 - 4.5 mg/dL   CK total     Status: Normal   Result Value Ref Range     39 - 308 U/L   CBC with platelets and differential     Status: Abnormal   Result Value Ref Range    WBC Count 7.4 4.0 - 11.0 10e3/uL    RBC Count 5.59 4.40 - 5.90 10e6/uL    Hemoglobin 15.9 13.3 - 17.7 g/dL    Hematocrit 50.7 40.0 - 53.0 %    MCV 91 78 - 100 fL    MCH 28.4 26.5 - 33.0 pg    MCHC 31.4 (L) 31.5 - 36.5 g/dL    RDW 12.8 10.0 - 15.0 %    Platelet Count 235 150 - 450 10e3/uL    % Neutrophils 76 %    % Lymphocytes 18 %    % Monocytes 5 %    % Eosinophils 1 %    % Basophils 0 %    % Immature Granulocytes 0 %    NRBCs per 100 WBC 0 <1 /100    Absolute Neutrophils 5.6 1.6 - 8.3 10e3/uL    Absolute  Lymphocytes 1.4 0.8 - 5.3 10e3/uL    Absolute Monocytes 0.3 0.0 - 1.3 10e3/uL    Absolute Eosinophils 0.1 0.0 - 0.7 10e3/uL    Absolute Basophils 0.0 0.0 - 0.2 10e3/uL    Absolute Immature Granulocytes 0.0 <=0.4 10e3/uL    Absolute NRBCs 0.0 10e3/uL   Potassium     Status: Normal   Result Value Ref Range    Potassium 5.0 3.4 - 5.3 mmol/L   EKG 12-lead, tracing only     Status: None   Result Value Ref Range    Systolic Blood Pressure  mmHg    Diastolic Blood Pressure  mmHg    Ventricular Rate 102 BPM    Atrial Rate  BPM    FL Interval  ms    QRS Duration 84 ms     ms    QTc 466 ms    P Axis  degrees    R AXIS -36 degrees    T Axis 131 degrees    Interpretation ECG       Atrial fibrillation with rapid ventricular response  Left axis deviation  Left ventricular hypertrophy with repolarization abnormality ( R in aVL , Evan product , Romhilt-Riggins )  Abnormal ECG  Unconfirmed report - interpretation of this ECG is computer generated - see medical record for final interpretation    Confirmed by - EMERGENCY ROOM, PHYSICIAN ([id number]),  Radha Paiz ([id number]) on 2/28/2025 9:52:08 AM     CBC with platelets differential     Status: Abnormal    Narrative    The following orders were created for panel order CBC with platelets differential.  Procedure                               Abnormality         Status                     ---------                               -----------         ------                     CBC with platelets and d...[[id number]]  Abnormal            Final result                 Please view results for these tests on the individual orders.

## 2025-02-28 NOTE — ED PROVIDER NOTES
ED Provider Note  [facility]      History     Chief Complaint   Patient presents with    Leg Pain     Pt presents to the ED for c/o right lower leg pain x 3 days. Pt reports that the pain is centralized in the right calf. Pt denies injury/trauma.     HPI  Loy Limon is a 69 year old male who has history of cirrhosis along with BPH diabetes hypothyroidism of medications including anticoagulants along with liver transplant 2018 presents the ER with 3 days of bilateral calf pain.  Patient denies any trauma etc. no fevers or chills or swelling history of DVT no coughing chest pain shortness of breath no abdominal pain no bleeding issues etc.  No change in medications patient is on a fair number of medications as noted.  Now presents here for evaluation he notes pain in the calves especially with palpation and also with walking.  Maybe some numbness in both legs but not new changes.  No history of any peripheral vascular issues as far as any type of stenting required or femoral arterial bypasses of the lower extremities or other interventional procedures.  No neurological changes otherwise.  No recent cold symptoms.  Now presents here for evaluation.    Past Medical History  Past Medical History:   Diagnosis Date    Anemia     Biliary stricture of transplanted liver (H) 2018    BPH (benign prostatic hyperplasia)     Cancer (H)     hepatocellualr carcinoma    Cirrhosis of liver (H) 2018    Diabetes (H)     Enlarged prostate     Hypothyroidism     Impotence of organic origin 2020    Inguinal hernia     Repaired with mesh on 18    Liver lesion 2018    Liver transplanted (H) 2018     donor liver transplant    Portal vein thrombosis     on path explant    Postoperative atrial fibrillation (H) 2018    Prostate cancer (H)     TB lung, latent     Treated      Past Surgical History:   Procedure Laterality Date    APPENDECTOMY      BRONCHOSCOPY  (RIGID OR FLEXIBLE), DIAGNOSTIC N/A 03/05/2024    Procedure: BRONCHOSCOPY, WITH BRONCHOALVEOLAR LAVAGE;  Surgeon: Jimmy Farley MD;  Location:  GI    COLONOSCOPY      2018    CV CORONARY ANGIOGRAM N/A 10/13/2021    Procedure: CV CORONARY ANGIOGRAM;  Surgeon: Yaya Hampton MD;  Location:  HEART CARDIAC CATH LAB    CV CORONARY LITHOTRIPSY PCI N/A 10/13/2021    Procedure: CV Coronary Lithotripsy PCI;  Surgeon: Yaya Hampton MD;  Location:  HEART CARDIAC CATH LAB    CV INSTANTANEOUS WAVE-FREE RATIO N/A 10/13/2021    Procedure: Instantaneous Wave-Free Ratio;  Surgeon: Yaya Hampton MD;  Location:  HEART CARDIAC CATH LAB    CV INTRAVASULAR ULTRASOUND N/A 10/13/2021    Procedure: Intravascular Ultrasound;  Surgeon: Yaya Hampton MD;  Location:  HEART CARDIAC CATH LAB    CV PCI STENT DRUG ELUTING N/A 10/13/2021    Procedure: Percutaneous Coronary Intervention Stent Drug Eluting;  Surgeon: Yaya Hampton MD;  Location:  HEART CARDIAC CATH LAB    DAVINCI PROSTATECTOMY N/A 01/03/2020    Procedure: Robot-assisted laparoscopic radical prostatectomy, Bladder biopsy;  Surgeon: Kenrick Casiano MD;  Location: [facility]    ENDOSCOPIC RETROGRADE CHOLANGIOPANCREATOGRAM N/A 12/28/2018    Procedure: ENDOSCOPIC RETROGRADE CHOLANGIOPANCREATOGRAM, with Billary Sphincterotomy and Billary Stent Placement;  Surgeon: Omero Lawler MD;  Location: [facility] OR    ENDOSCOPIC RETROGRADE CHOLANGIOPANCREATOGRAM  02/18/2019    Procedure: COMBINED ENDOSCOPIC RETROGRADE CHOLANGIOPANCREATOGRAPHY, Bile Duct Stent Exchange;  Surgeon: Omero Lawler MD;  Location: [facility]    ENDOSCOPIC RETROGRADE CHOLANGIOPANCREATOGRAM N/A 04/29/2019    Procedure: ENDOSCOPIC RETROGRADE CHOLANGIOPANCREATOGRAPHY, WITH BILIARY STENT REMOVAL AND STONE EXTRACTION;  Surgeon: Omero Lawler MD;  Location: [facility] OR    HERNIORRHAPHY INGUINAL  12/22/2018    Procedure:  "Herniorrhaphy inguinal;  Surgeon: Emil Echevarria MD;  Location: [facility] OR    IR LIVER BIOPSY PERCUTANEOUS  2018    LAPAROTOMY EXPLORATORY      per the patient \"for infection in the LLQ\"     PICC INSERTION Right 2024    47 cm basilic    TRANSPLANT LIVER RECIPIENT  DONOR N/A 2018    Procedure: TRANSPLANT LIVER RECIPIENT  DONOR;  Surgeon: Emil Echevarria MD;  Location: [facility] OR     oxyCODONE (ROXICODONE) 5 MG tablet  albuterol (PROAIR HFA/PROVENTIL HFA/VENTOLIN HFA) 108 (90 Base) MCG/ACT inhaler  Alcohol Swabs PADS  apixaban ANTICOAGULANT (ELIQUIS) 5 MG tablet  atorvastatin (LIPITOR) 40 MG tablet  blood glucose (NO BRAND SPECIFIED) lancets standard  blood glucose (NO BRAND SPECIFIED) test strip  blood glucose monitoring (NO BRAND SPECIFIED) meter device kit  diltiazem ER COATED BEADS (CARDIZEM CD/CARTIA XT) 240 MG 24 hr capsule  fluticasone (FLONASE) 50 MCG/ACT nasal spray  guaiFENesin (MUCINEX) 600 MG 12 hr tablet  hydrALAZINE (APRESOLINE) 25 MG tablet  insulin glargine (LANTUS SOLOSTAR) 100 UNIT/ML pen  insulin pen needle (31G X 5 MM) 31G X 5 MM miscellaneous  levothyroxine (SYNTHROID/LEVOTHROID) 150 MCG tablet  metFORMIN (GLUCOPHAGE XR) 500 MG 24 hr tablet  mycophenolate (GENERIC EQUIVALENT) 500 MG tablet  omeprazole (PRILOSEC) 20 MG DR capsule  predniSONE (DELTASONE) 5 MG tablet  ursodiol (ACTIGALL) 250 MG tablet  Vitamin D3 (CHOLECALCIFEROL) 25 mcg (1000 units) tablet      No Known Allergies  Family History  Family History   Problem Relation Age of Onset    Memory loss Mother     Liver Disease Brother     Skin Cancer No family hx of     Melanoma No family hx of      Social History   Social History     Tobacco Use    Smoking status: Former     Current packs/day: 0.00     Average packs/day: (1.4 ttl pk-yrs)     Types: Cigarettes, Cigars     Start date: 1970     Quit date: 3/14/2018     Years since quittin.9    Smokeless tobacco: Never    Tobacco comments:     Quit " "smoking Feb 2018, one cigarette after dinner   Vaping Use    Vaping status: Never Used   Substance Use Topics    Alcohol use: No     Comment: Quit drinking Feb 2018    Drug use: No      A medically appropriate review of systems was performed with pertinent positives and negatives noted in the HPI, and all other systems negative.    Physical Exam   BP: 138/81  Pulse: 88  Temp: 97.8  F (36.6  C)  Resp: 17  Height: 162.6 cm (5' 4\")  Weight: 89.8 kg (198 lb)  SpO2: 96 %  Physical Exam  Vitals and nursing note reviewed.   Constitutional:       General: He is in acute distress.      Appearance: He is well-developed. He is not diaphoretic.      Comments: With  services used we did evaluate the patient discussed here in the ER.  Patient complaining of bilateral calf pain nontoxic.   HENT:      Head: Normocephalic and atraumatic.      Nose: Nose normal. No congestion.      Mouth/Throat:      Mouth: Mucous membranes are moist.      Pharynx: Oropharynx is clear.   Eyes:      General: No scleral icterus.     Extraocular Movements: Extraocular movements intact.      Conjunctiva/sclera: Conjunctivae normal.      Pupils: Pupils are equal, round, and reactive to light.   Cardiovascular:      Rate and Rhythm: Normal rate and regular rhythm.   Pulmonary:      Effort: Pulmonary effort is normal. No respiratory distress.      Breath sounds: No stridor.   Abdominal:      General: Abdomen is flat. There is distension.      Palpations: Abdomen is soft.      Tenderness: There is no abdominal tenderness. There is no guarding.   Musculoskeletal:         General: Tenderness present. No swelling or deformity. Normal range of motion.      Cervical back: Normal range of motion and neck supple.      Comments: Patient with positive bilateral calf tenderness but distal pulses are intact no sign of any coolness or concern of any acute arterial occlusive type findings.  Neurologically intact otherwise no knee effusions.  No ankle " effusions.  No other rashes identified.  But symmetrical bilateral calf tenderness upon palpation in the mid calf region.   Lymphadenopathy:      Cervical: No cervical adenopathy.   Skin:     General: Skin is warm and dry.      Capillary Refill: Capillary refill takes less than 2 seconds.      Coloration: Skin is not jaundiced.      Findings: No rash.   Neurological:      General: No focal deficit present.      Mental Status: He is alert and oriented to person, place, and time. Mental status is at baseline.      Cranial Nerves: No cranial nerve deficit.      Sensory: No sensory deficit.      Coordination: Coordination normal.   Psychiatric:      Comments: Appropriate in the ER           ED Course, Procedures, & Data        Records reviewed as noted in epic.  Patient history of liver transplant and was 2018 medications reviewed etc. history of diabetes along with other medical issues history of atrial fibs on Eliquis for this also.    In the ER will evaluate with bilateral Doppler ultrasounds along with x-rays of both lower extremities.  Will get a CK level along with other labs as patient is on a statin also.  Check other electrolytes etc. EKG was ordered.  Further evaluation with Tylenol for pain.  Patient vitally stable here in the ER.  Labs drawn reviewed patient received liter normal saline.  Initially patient potassium was 5.4 repeat after fluids down to 5.  CK1 08.  Sodium is 139.  Bicarb 27 gap is 7 creatinine 1.12.  BUN is 18.6 calcium 8.7 glucose 199 LFTs normal limits INR 0.95.  Magnesium 1.8.  White count 7.4 hemoglobin 15.9.    Patient to the ER did do a medication review also discussed with pharmacy.    Patient had bilateral lower extremity Doppler studies which were negative for any DVT noted.  Patient also had bilateral tib-fib x-rays done without any bony changes or concerns at this point.    I reassessed distal CMS which appears to be intact warm feet etc. no sign of any vascular occlusion several  times.  Patient did receive one 5 mg oxycodone which markedly helped his pain patient feels much better.  As discussed with pharmacy also this certainly may be a myopathy related to his atorvastatin/Lipitor.  Patient this point feels comfortable going home felt at this point it may be a myopathy I cannot find any other acute changes EKG showed chronic A-fib rate controlled.  Patient this point was discharged home holding his atorvastatin for 1 week following up with his doctor in a week also.  Also I gave him 6 of the oxycodone 5 mg to take every 12 hours as needed for severe pain and sleep.  Because of patient's liver transplant patient avoids Tylenol patient with A-fib on anticoagulants avoids NSAIDs also.    Patient feels fine comfortable going home observed for several hours here in the ER is alert ambulating and come to being discharged.      Procedures            EKG Interpretation:      Interpreted by Feliciano Luis MD  Time reviewed: 811  Symptoms at time of EKG: bilateral calf pain   Rhythm: a fib  Rate:107  Axis: normal  Ectopy: none  Conduction: normal  ST Segments/ T Waves: Nonspecific ST-T wave changes  Q Waves: none  Comparison to prior: No old EKG available    Clinical Impression: afib noted            Results for orders placed or performed during the hospital encounter of 02/28/25   US Lower Extremity Venous Duplex Bilateral     Status: None    Narrative    EXAMINATION: DOPPLER VENOUS ULTRASOUND OF BILATERAL LOWER EXTREMITIES,  2/28/2025 9:52 AM     COMPARISON: Ultrasound 4/14/2024    HISTORY: bilateral calf pain eval for dvt    TECHNIQUE:  Gray-scale evaluation with compression, spectral flow and  color Doppler assessment of the deep venous system of both legs from  groin to knee, and then at the ankles.    FINDINGS:  In both lower extremities, the common femoral, femoral, popliteal,  peroneal, and posterior tibial veins demonstrate normal  compressibility and blood flow.      Impression     IMPRESSION:  No evidence of deep venous thrombosis in either lower extremity.       I have personally reviewed the examination and initial interpretation  and I agree with the findings.    CHAPARRITA MANRIQUEZ DO         SYSTEM ID:  [id number]   XR Tibia and Fibula Bilateral 2 Views     Status: None    Narrative    2 views bilateral tibia/fibula radiographs 2/28/2025 9:03 AM    History: bilateral calf pain    Additional History from EMR: Reported 3 days of bilateral calf pain,  denies trauma    Comparison: None    Findings:    2 AP and 2 cross table lateral views of the bilateral tibia/fibula  were obtained.     No acute osseous abnormality.      Knee and ankle joints are incompletely assessed but grossly congruent.  Scattered moderate degenerative changes. Chronic appearing periosteal  thickening of the posterior mid left tibial shaft.    Vascular calcifications.        Impression    Impression:  1. No acute osseous abnormality.  2. Moderate degenerative changes.    I have personally reviewed the examination and initial interpretation  and I agree with the findings.    YU VELAZCO MD (Joe)         SYSTEM ID:  [id number]   INR     Status: Normal   Result Value Ref Range    INR 0.95 0.85 - 1.15   Partial thromboplastin time     Status: Normal   Result Value Ref Range    aPTT 29 22 - 38 Seconds   Comprehensive metabolic panel     Status: Abnormal   Result Value Ref Range    Sodium 139 135 - 145 mmol/L    Potassium 5.4 (H) 3.4 - 5.3 mmol/L    Carbon Dioxide (CO2) 27 22 - 29 mmol/L    Anion Gap 7 7 - 15 mmol/L    Urea Nitrogen 18.6 8.0 - 23.0 mg/dL    Creatinine 1.12 0.67 - 1.17 mg/dL    GFR Estimate 70 >60 mL/min/1.73m2    Calcium 8.7 (L) 8.8 - 10.4 mg/dL    Chloride 105 98 - 107 mmol/L    Glucose 199 (H) 70 - 99 mg/dL    Alkaline Phosphatase 136 40 - 150 U/L    AST 23 0 - 45 U/L    ALT 22 0 - 70 U/L    Protein Total 6.3 (L) 6.4 - 8.3 g/dL    Albumin 3.4 (L) 3.5 - 5.2 g/dL    Bilirubin Total 0.8 <=1.2 mg/dL   Magnesium      Status: Normal   Result Value Ref Range    Magnesium 1.8 1.7 - 2.3 mg/dL   Phosphorus     Status: Normal   Result Value Ref Range    Phosphorus 3.2 2.5 - 4.5 mg/dL   CK total     Status: Normal   Result Value Ref Range     39 - 308 U/L   CBC with platelets and differential     Status: Abnormal   Result Value Ref Range    WBC Count 7.4 4.0 - 11.0 10e3/uL    RBC Count 5.59 4.40 - 5.90 10e6/uL    Hemoglobin 15.9 13.3 - 17.7 g/dL    Hematocrit 50.7 40.0 - 53.0 %    MCV 91 78 - 100 fL    MCH 28.4 26.5 - 33.0 pg    MCHC 31.4 (L) 31.5 - 36.5 g/dL    RDW 12.8 10.0 - 15.0 %    Platelet Count 235 150 - 450 10e3/uL    % Neutrophils 76 %    % Lymphocytes 18 %    % Monocytes 5 %    % Eosinophils 1 %    % Basophils 0 %    % Immature Granulocytes 0 %    NRBCs per 100 WBC 0 <1 /100    Absolute Neutrophils 5.6 1.6 - 8.3 10e3/uL    Absolute Lymphocytes 1.4 0.8 - 5.3 10e3/uL    Absolute Monocytes 0.3 0.0 - 1.3 10e3/uL    Absolute Eosinophils 0.1 0.0 - 0.7 10e3/uL    Absolute Basophils 0.0 0.0 - 0.2 10e3/uL    Absolute Immature Granulocytes 0.0 <=0.4 10e3/uL    Absolute NRBCs 0.0 10e3/uL   Potassium     Status: Normal   Result Value Ref Range    Potassium 5.0 3.4 - 5.3 mmol/L   EKG 12-lead, tracing only     Status: None   Result Value Ref Range    Systolic Blood Pressure  mmHg    Diastolic Blood Pressure  mmHg    Ventricular Rate 102 BPM    Atrial Rate  BPM    NH Interval  ms    QRS Duration 84 ms     ms    QTc 466 ms    P Axis  degrees    R AXIS -36 degrees    T Axis 131 degrees    Interpretation ECG       Atrial fibrillation with rapid ventricular response  Left axis deviation  Left ventricular hypertrophy with repolarization abnormality ( R in aVL , Chase product , Romhilt-Riggins )  Abnormal ECG  Unconfirmed report - interpretation of this ECG is computer generated - see medical record for final interpretation    Confirmed by - EMERGENCY ROOM, PHYSICIAN ([id number]),  Radha Paiz ([id number]) on 2/28/2025 9:52:08  AM     CBC with platelets differential     Status: Abnormal    Narrative    The following orders were created for panel order CBC with platelets differential.  Procedure                               Abnormality         Status                     ---------                               -----------         ------                     CBC with platelets and d...[[id number]]  Abnormal            Final result                 Please view results for these tests on the individual orders.     Medications   sodium chloride 0.9% BOLUS 1,000 mL (0 mLs Intravenous Stopped 2/28/25 0949)   acetaminophen (TYLENOL) tablet 650 mg (650 mg Oral Not Given 2/28/25 0949)   oxyCODONE (ROXICODONE) tablet 5 mg (5 mg Oral $Given 2/28/25 1222)     Labs Ordered and Resulted from Time of ED Arrival to Time of ED Departure   COMPREHENSIVE METABOLIC PANEL - Abnormal       Result Value    Sodium 139      Potassium 5.4 (*)     Carbon Dioxide (CO2) 27      Anion Gap 7      Urea Nitrogen 18.6      Creatinine 1.12      GFR Estimate 70      Calcium 8.7 (*)     Chloride 105      Glucose 199 (*)     Alkaline Phosphatase 136      AST 23      ALT 22      Protein Total 6.3 (*)     Albumin 3.4 (*)     Bilirubin Total 0.8     CBC WITH PLATELETS AND DIFFERENTIAL - Abnormal    WBC Count 7.4      RBC Count 5.59      Hemoglobin 15.9      Hematocrit 50.7      MCV 91      MCH 28.4      MCHC 31.4 (*)     RDW 12.8      Platelet Count 235      % Neutrophils 76      % Lymphocytes 18      % Monocytes 5      % Eosinophils 1      % Basophils 0      % Immature Granulocytes 0      NRBCs per 100 WBC 0      Absolute Neutrophils 5.6      Absolute Lymphocytes 1.4      Absolute Monocytes 0.3      Absolute Eosinophils 0.1      Absolute Basophils 0.0      Absolute Immature Granulocytes 0.0      Absolute NRBCs 0.0     INR - Normal    INR 0.95     PARTIAL THROMBOPLASTIN TIME - Normal    aPTT 29     MAGNESIUM - Normal    Magnesium 1.8     PHOSPHORUS - Normal    Phosphorus 3.2     CK  TOTAL - Normal         POTASSIUM - Normal    Potassium 5.0       US Lower Extremity Venous Duplex Bilateral   Final Result   IMPRESSION:   No evidence of deep venous thrombosis in either lower extremity.          I have personally reviewed the examination and initial interpretation   and I agree with the findings.      CHAPARRITA MANRIQUEZ DO            SYSTEM ID:  [id number]      XR Tibia and Fibula Bilateral 2 Views   Final Result   Impression:   1. No acute osseous abnormality.   2. Moderate degenerative changes.      I have personally reviewed the examination and initial interpretation   and I agree with the findings.      YU VELAZCO MD (Joe)            SYSTEM ID:  [id number]             Critical care was not performed.     Medical Decision Making  The patient's presentation was of moderate complexity (an acute illness with systemic symptoms).    The patient's evaluation involved:  review of external note(s) from 3+ sources (see separate area of note for details)  review of 3+ test result(s) ordered prior to this encounter (see separate area of note for details)  ordering and/or review of 3+ test(s) in this encounter (see separate area of note for details)  discussion of management or test interpretation with another health professional (see separate area of note for details)    The patient's management necessitated moderate risk (prescription drug management including medications given in the ED).    Assessment & Plan   69-year-old male with history of liver transplant also history chronic A-fib on Eliquis history of elevated lipids also on Lipitor presents to the ER with bilateral calf pain for 3 days.  No trauma no swelling no history of DVTs.  No shortness of breath chest pain no fevers no rash no sign of any arterial occlusion or concern of peripheral vascular disease etc.  Patient here in the ER generalized tender to palpation bilateral symmetrical ultrasounds negative for DVT chest x-rays are  negative for any bony changes other labs reveal potassium 5.4 with fluids down to 5 other labs stable.  Patient feels better did receive 5 mg oxycodone at this point discussed with patient with the  services that certainly the atorvastatin may be causing a myopathy would recommend holding it for a week as I discussed with pharmacy patient will follow-up with MD in a week for recheck I did send him out with six 5 mg oxycodones to take 1 every 12 hours as needed for pain.  Patient feels comfortable plan return if any concerns at all.       I have reviewed the nursing notes. I have reviewed the findings, diagnosis, plan and need for follow up with the patient.    Discharge Medication List as of 2/28/2025  3:03 PM        START taking these medications    Details   oxyCODONE (ROXICODONE) 5 MG tablet Take 1 tablet (5 mg) by mouth every 12 hours as needed for moderate to severe pain., Disp-6 tablet, R-0, E-Prescribe             Final diagnoses:   Bilateral calf pain - possible statin myopathy       Feliciano Luis  [facility] EMERGENCY DEPARTMENT  2/28/2025    This note was created at least in part by the use of dragon voice dictation system. Inadvertent typographical errors may still exist.  Feliciano Luis MD.  Patient evaluated in the emergency department during the COVID-19 pandemic period. Careful attention to patients safety was addressed throughout the evaluation. Evaluation and treatment management was initiated with disposition made efficiently and appropriate as possible to minimize any risk of potential exposure to patient during this evaluation.       Feliciano Luis MD  02/28/25 1932

## 2025-02-28 NOTE — ED TRIAGE NOTES
Pt presents to the ED for c/o right lower leg pain x 3 days. Pt reports that the pain is centralized in the right calf. Pt denies injury/trauma.        Triage Assessment (Adult)       Row Name 02/28/25 0753          Triage Assessment    Airway WDL WDL        Respiratory WDL    Respiratory WDL WDL        Skin Circulation/Temperature WDL    Skin Circulation/Temperature WDL WDL        Cardiac WDL    Cardiac WDL WDL        Peripheral/Neurovascular WDL    Peripheral Neurovascular WDL WDL        Cognitive/Neuro/Behavioral WDL    Cognitive/Neuro/Behavioral WDL WDL

## 2025-03-06 ENCOUNTER — OFFICE VISIT (OUTPATIENT)
Dept: FAMILY MEDICINE | Facility: CLINIC | Age: 70
End: 2025-03-06
Payer: COMMERCIAL

## 2025-03-06 ENCOUNTER — TELEPHONE (OUTPATIENT)
Dept: INTERNAL MEDICINE | Facility: CLINIC | Age: 70
End: 2025-03-06

## 2025-03-06 VITALS
HEIGHT: 64 IN | HEART RATE: 92 BPM | SYSTOLIC BLOOD PRESSURE: 158 MMHG | TEMPERATURE: 98 F | DIASTOLIC BLOOD PRESSURE: 86 MMHG | RESPIRATION RATE: 18 BRPM | OXYGEN SATURATION: 96 % | WEIGHT: 192.4 LBS | BODY MASS INDEX: 32.85 KG/M2

## 2025-03-06 DIAGNOSIS — M79.604 BILATERAL LEG PAIN: Primary | ICD-10-CM

## 2025-03-06 DIAGNOSIS — M79.605 BILATERAL LEG PAIN: Primary | ICD-10-CM

## 2025-03-06 DIAGNOSIS — E11.59 TYPE 2 DIABETES MELLITUS WITH OTHER CIRCULATORY COMPLICATIONS (H): ICD-10-CM

## 2025-03-06 RX ORDER — OXYCODONE HYDROCHLORIDE 5 MG/1
5 TABLET ORAL EVERY 6 HOURS PRN
Qty: 12 TABLET | Refills: 0 | Status: SHIPPED | OUTPATIENT
Start: 2025-03-06 | End: 2025-03-09

## 2025-03-06 ASSESSMENT — ASTHMA QUESTIONNAIRES
QUESTION_4 LAST FOUR WEEKS HOW OFTEN HAVE YOU USED YOUR RESCUE INHALER OR NEBULIZER MEDICATION (SUCH AS ALBUTEROL): NOT AT ALL
QUESTION_5 LAST FOUR WEEKS HOW WOULD YOU RATE YOUR ASTHMA CONTROL: COMPLETELY CONTROLLED
QUESTION_1 LAST FOUR WEEKS HOW MUCH OF THE TIME DID YOUR ASTHMA KEEP YOU FROM GETTING AS MUCH DONE AT WORK, SCHOOL OR AT HOME: NONE OF THE TIME
ACT_TOTALSCORE: 25
QUESTION_3 LAST FOUR WEEKS HOW OFTEN DID YOUR ASTHMA SYMPTOMS (WHEEZING, COUGHING, SHORTNESS OF BREATH, CHEST TIGHTNESS OR PAIN) WAKE YOU UP AT NIGHT OR EARLIER THAN USUAL IN THE MORNING: NOT AT ALL
QUESTION_2 LAST FOUR WEEKS HOW OFTEN HAVE YOU HAD SHORTNESS OF BREATH: NOT AT ALL
ACT_TOTALSCORE: 25

## 2025-03-06 NOTE — TELEPHONE ENCOUNTER
Patient confirmed scheduled appointment:  Date: 3/6/25  Time: 630pm  Visit type: ED FOLLOW UP   Provider: PCP  Location: [address] [place] [address]  Testing/imaging: -  Additional notes: patient confirmed scheduled appt - rescheduled from Dr. Smith due to 3/6 clinic cancellation

## 2025-03-07 ENCOUNTER — APPOINTMENT (OUTPATIENT)
Dept: GENERAL RADIOLOGY | Facility: CLINIC | Age: 70
DRG: 280 | End: 2025-03-07
Attending: EMERGENCY MEDICINE
Payer: COMMERCIAL

## 2025-03-07 ENCOUNTER — HOSPITAL ENCOUNTER (INPATIENT)
Facility: CLINIC | Age: 70
LOS: 3 days | Discharge: HOME | DRG: 280 | End: 2025-03-10
Attending: EMERGENCY MEDICINE | Admitting: INTERNAL MEDICINE
Payer: COMMERCIAL

## 2025-03-07 ENCOUNTER — APPOINTMENT (OUTPATIENT)
Dept: CT IMAGING | Facility: CLINIC | Age: 70
DRG: 280 | End: 2025-03-07
Attending: EMERGENCY MEDICINE
Payer: COMMERCIAL

## 2025-03-07 DIAGNOSIS — Z79.01 ANTICOAGULATED: ICD-10-CM

## 2025-03-07 DIAGNOSIS — I21.4 NSTEMI (NON-ST ELEVATED MYOCARDIAL INFARCTION) (H): ICD-10-CM

## 2025-03-07 DIAGNOSIS — M54.9 BACK PAIN, UNSPECIFIED BACK LOCATION, UNSPECIFIED BACK PAIN LATERALITY, UNSPECIFIED CHRONICITY: ICD-10-CM

## 2025-03-07 DIAGNOSIS — I48.20 CHRONIC ATRIAL FIBRILLATION (H): Primary | ICD-10-CM

## 2025-03-07 LAB
ALBUMIN SERPL BCG-MCNC: 3.9 G/DL (ref 3.5–5.2)
ALBUMIN UR-MCNC: 600 MG/DL
ALP SERPL-CCNC: 150 U/L (ref 40–150)
ALT SERPL W P-5'-P-CCNC: 23 U/L (ref 0–70)
ANION GAP SERPL CALCULATED.3IONS-SCNC: 13 MMOL/L (ref 7–15)
APPEARANCE UR: CLEAR
AST SERPL W P-5'-P-CCNC: 37 U/L (ref 0–45)
ATRIAL RATE - MUSE: NORMAL BPM
BASE EXCESS BLDV CALC-SCNC: 1 MMOL/L (ref -3–3)
BASOPHILS # BLD AUTO: 0 10E3/UL (ref 0–0.2)
BASOPHILS NFR BLD AUTO: 0 %
BILIRUB SERPL-MCNC: 0.8 MG/DL
BILIRUB UR QL STRIP: NEGATIVE
BUN SERPL-MCNC: 19.8 MG/DL (ref 8–23)
CA-I BLD-MCNC: 4.4 MG/DL (ref 4.4–5.2)
CALCIUM SERPL-MCNC: 9.3 MG/DL (ref 8.8–10.4)
CHLORIDE SERPL-SCNC: 99 MMOL/L (ref 98–107)
COLOR UR AUTO: ABNORMAL
CPB POCT: NO
CREAT SERPL-MCNC: 1 MG/DL (ref 0.67–1.17)
D DIMER PPP FEU-MCNC: 1.27 UG/ML FEU (ref 0–0.5)
DIASTOLIC BLOOD PRESSURE - MUSE: NORMAL MMHG
EGFRCR SERPLBLD CKD-EPI 2021: 80 ML/MIN/1.73M2
EOSINOPHIL # BLD AUTO: 0 10E3/UL (ref 0–0.7)
EOSINOPHIL NFR BLD AUTO: 0 %
ERYTHROCYTE [DISTWIDTH] IN BLOOD BY AUTOMATED COUNT: 13 % (ref 10–15)
ERYTHROCYTE [DISTWIDTH] IN BLOOD BY AUTOMATED COUNT: 13.1 % (ref 10–15)
GLUCOSE BLD-MCNC: 247 MG/DL (ref 70–99)
GLUCOSE BLDC GLUCOMTR-MCNC: 157 MG/DL (ref 70–99)
GLUCOSE BLDC GLUCOMTR-MCNC: 162 MG/DL (ref 70–99)
GLUCOSE SERPL-MCNC: 260 MG/DL (ref 70–99)
GLUCOSE UR STRIP-MCNC: 500 MG/DL
HCO3 BLDV-SCNC: 27 MMOL/L (ref 21–28)
HCO3 SERPL-SCNC: 25 MMOL/L (ref 22–29)
HCT VFR BLD AUTO: 43.6 % (ref 40–53)
HCT VFR BLD AUTO: 50.9 % (ref 40–53)
HCT VFR BLD CALC: 52 % (ref 40–53)
HGB BLD-MCNC: 14.4 G/DL (ref 13.3–17.7)
HGB BLD-MCNC: 16.7 G/DL (ref 13.3–17.7)
HGB BLD-MCNC: 17.7 G/DL (ref 13.3–17.7)
HGB UR QL STRIP: ABNORMAL
HOLD SPECIMEN: NORMAL
HYALINE CASTS: 1 /LPF
IMM GRANULOCYTES # BLD: 0.1 10E3/UL
IMM GRANULOCYTES NFR BLD: 0 %
INTERPRETATION ECG - MUSE: NORMAL
KETONES UR STRIP-MCNC: NEGATIVE MG/DL
LEUKOCYTE ESTERASE UR QL STRIP: NEGATIVE
LIPASE SERPL-CCNC: 15 U/L (ref 13–60)
LYMPHOCYTES # BLD AUTO: 1.2 10E3/UL (ref 0.8–5.3)
LYMPHOCYTES NFR BLD AUTO: 10 %
MAGNESIUM SERPL-MCNC: 1.8 MG/DL (ref 1.7–2.3)
MCH RBC QN AUTO: 28.1 PG (ref 26.5–33)
MCH RBC QN AUTO: 28.1 PG (ref 26.5–33)
MCHC RBC AUTO-ENTMCNC: 32.8 G/DL (ref 31.5–36.5)
MCHC RBC AUTO-ENTMCNC: 33 G/DL (ref 31.5–36.5)
MCV RBC AUTO: 85 FL (ref 78–100)
MCV RBC AUTO: 86 FL (ref 78–100)
MONOCYTES # BLD AUTO: 0.2 10E3/UL (ref 0–1.3)
MONOCYTES NFR BLD AUTO: 2 %
MUCOUS THREADS #/AREA URNS LPF: PRESENT /LPF
NEUTROPHILS # BLD AUTO: 10.1 10E3/UL (ref 1.6–8.3)
NEUTROPHILS NFR BLD AUTO: 87 %
NITRATE UR QL: NEGATIVE
NRBC # BLD AUTO: 0 10E3/UL
NRBC BLD AUTO-RTO: 0 /100
NT-PROBNP SERPL-MCNC: ABNORMAL PG/ML (ref 0–900)
P AXIS - MUSE: NORMAL DEGREES
PCO2 BLDV: 47 MM HG (ref 40–50)
PH BLDV: 7.37 [PH] (ref 7.32–7.43)
PH UR STRIP: 7 [PH] (ref 5–7)
PLATELET # BLD AUTO: 233 10E3/UL (ref 150–450)
PLATELET # BLD AUTO: 283 10E3/UL (ref 150–450)
PO2 BLDV: 26 MM HG (ref 25–47)
POTASSIUM BLD-SCNC: 4.6 MMOL/L (ref 3.4–5.3)
POTASSIUM SERPL-SCNC: 4.6 MMOL/L (ref 3.4–5.3)
PR INTERVAL - MUSE: NORMAL MS
PROT SERPL-MCNC: 7.2 G/DL (ref 6.4–8.3)
QRS DURATION - MUSE: 84 MS
QT - MUSE: 344 MS
QTC - MUSE: 492 MS
R AXIS - MUSE: -34 DEGREES
RBC # BLD AUTO: 5.12 10E6/UL (ref 4.4–5.9)
RBC # BLD AUTO: 5.94 10E6/UL (ref 4.4–5.9)
RBC URINE: 2 /HPF
SAO2 % BLDV: 46 % (ref 70–75)
SODIUM BLD-SCNC: 137 MMOL/L (ref 135–145)
SODIUM SERPL-SCNC: 137 MMOL/L (ref 135–145)
SP GR UR STRIP: 1.02 (ref 1–1.03)
SYSTOLIC BLOOD PRESSURE - MUSE: NORMAL MMHG
T AXIS - MUSE: 136 DEGREES
TROPONIN T BLD-MCNC: 0.2 UG/L
TROPONIN T SERPL HS-MCNC: 2753 NG/L
TROPONIN T SERPL HS-MCNC: 407 NG/L
TROPONIN T SERPL HS-MCNC: 70 NG/L
UROBILINOGEN UR STRIP-MCNC: NORMAL MG/DL
VENTRICULAR RATE- MUSE: 123 BPM
WBC # BLD AUTO: 11.6 10E3/UL (ref 4–11)
WBC # BLD AUTO: 8.1 10E3/UL (ref 4–11)
WBC URINE: 1 /HPF

## 2025-03-07 PROCEDURE — 84484 ASSAY OF TROPONIN QUANT: CPT

## 2025-03-07 PROCEDURE — 82803 BLOOD GASES ANY COMBINATION: CPT

## 2025-03-07 PROCEDURE — 250N000011 HC RX IP 250 OP 636: Performed by: EMERGENCY MEDICINE

## 2025-03-07 PROCEDURE — 71045 X-RAY EXAM CHEST 1 VIEW: CPT

## 2025-03-07 PROCEDURE — 82374 ASSAY BLOOD CARBON DIOXIDE: CPT | Performed by: EMERGENCY MEDICINE

## 2025-03-07 PROCEDURE — 80053 COMPREHEN METABOLIC PANEL: CPT

## 2025-03-07 PROCEDURE — 71275 CT ANGIOGRAPHY CHEST: CPT | Mod: 26 | Performed by: STUDENT IN AN ORGANIZED HEALTH CARE EDUCATION/TRAINING PROGRAM

## 2025-03-07 PROCEDURE — 84155 ASSAY OF PROTEIN SERUM: CPT | Performed by: EMERGENCY MEDICINE

## 2025-03-07 PROCEDURE — 36415 COLL VENOUS BLD VENIPUNCTURE: CPT | Performed by: EMERGENCY MEDICINE

## 2025-03-07 PROCEDURE — 96361 HYDRATE IV INFUSION ADD-ON: CPT | Performed by: EMERGENCY MEDICINE

## 2025-03-07 PROCEDURE — 87086 URINE CULTURE/COLONY COUNT: CPT | Performed by: EMERGENCY MEDICINE

## 2025-03-07 PROCEDURE — 250N000011 HC RX IP 250 OP 636: Performed by: INTERNAL MEDICINE

## 2025-03-07 PROCEDURE — 71275 CT ANGIOGRAPHY CHEST: CPT

## 2025-03-07 PROCEDURE — 85025 COMPLETE CBC W/AUTO DIFF WBC: CPT | Performed by: EMERGENCY MEDICINE

## 2025-03-07 PROCEDURE — 71045 X-RAY EXAM CHEST 1 VIEW: CPT | Mod: 26 | Performed by: STUDENT IN AN ORGANIZED HEALTH CARE EDUCATION/TRAINING PROGRAM

## 2025-03-07 PROCEDURE — 84484 ASSAY OF TROPONIN QUANT: CPT | Performed by: INTERNAL MEDICINE

## 2025-03-07 PROCEDURE — 74174 CTA ABD&PLVS W/CONTRAST: CPT

## 2025-03-07 PROCEDURE — 250N000013 HC RX MED GY IP 250 OP 250 PS 637

## 2025-03-07 PROCEDURE — 85014 HEMATOCRIT: CPT | Performed by: INTERNAL MEDICINE

## 2025-03-07 PROCEDURE — 250N000013 HC RX MED GY IP 250 OP 250 PS 637: Performed by: EMERGENCY MEDICINE

## 2025-03-07 PROCEDURE — 93005 ELECTROCARDIOGRAM TRACING: CPT | Performed by: EMERGENCY MEDICINE

## 2025-03-07 PROCEDURE — 36415 COLL VENOUS BLD VENIPUNCTURE: CPT | Performed by: INTERNAL MEDICINE

## 2025-03-07 PROCEDURE — 99291 CRITICAL CARE FIRST HOUR: CPT | Performed by: EMERGENCY MEDICINE

## 2025-03-07 PROCEDURE — 82962 GLUCOSE BLOOD TEST: CPT

## 2025-03-07 PROCEDURE — 96365 THER/PROPH/DIAG IV INF INIT: CPT | Performed by: EMERGENCY MEDICINE

## 2025-03-07 PROCEDURE — 83880 ASSAY OF NATRIURETIC PEPTIDE: CPT

## 2025-03-07 PROCEDURE — 999N000128 HC STATISTIC PERIPHERAL IV START W/O US GUIDANCE

## 2025-03-07 PROCEDURE — 258N000003 HC RX IP 258 OP 636: Performed by: EMERGENCY MEDICINE

## 2025-03-07 PROCEDURE — 85379 FIBRIN DEGRADATION QUANT: CPT | Performed by: EMERGENCY MEDICINE

## 2025-03-07 PROCEDURE — 83690 ASSAY OF LIPASE: CPT | Performed by: EMERGENCY MEDICINE

## 2025-03-07 PROCEDURE — 74174 CTA ABD&PLVS W/CONTRAST: CPT | Mod: 26 | Performed by: STUDENT IN AN ORGANIZED HEALTH CARE EDUCATION/TRAINING PROGRAM

## 2025-03-07 PROCEDURE — 36415 COLL VENOUS BLD VENIPUNCTURE: CPT

## 2025-03-07 PROCEDURE — 99223 1ST HOSP IP/OBS HIGH 75: CPT | Mod: GC | Performed by: INTERNAL MEDICINE

## 2025-03-07 PROCEDURE — 81001 URINALYSIS AUTO W/SCOPE: CPT | Performed by: EMERGENCY MEDICINE

## 2025-03-07 PROCEDURE — 84484 ASSAY OF TROPONIN QUANT: CPT | Performed by: EMERGENCY MEDICINE

## 2025-03-07 PROCEDURE — 82435 ASSAY OF BLOOD CHLORIDE: CPT | Performed by: EMERGENCY MEDICINE

## 2025-03-07 PROCEDURE — 83735 ASSAY OF MAGNESIUM: CPT | Performed by: EMERGENCY MEDICINE

## 2025-03-07 PROCEDURE — 99291 CRITICAL CARE FIRST HOUR: CPT | Mod: 25 | Performed by: EMERGENCY MEDICINE

## 2025-03-07 PROCEDURE — 250N000012 HC RX MED GY IP 250 OP 636 PS 637

## 2025-03-07 PROCEDURE — 120N000005 HC R&B MS OVERFLOW UMMC

## 2025-03-07 RX ORDER — PREDNISONE 5 MG/1
5 TABLET ORAL DAILY
Status: DISCONTINUED | OUTPATIENT
Start: 2025-03-08 | End: 2025-03-10 | Stop reason: HOSPADM

## 2025-03-07 RX ORDER — MAGNESIUM SULFATE HEPTAHYDRATE 40 MG/ML
2 INJECTION, SOLUTION INTRAVENOUS ONCE
Status: COMPLETED | OUTPATIENT
Start: 2025-03-07 | End: 2025-03-07

## 2025-03-07 RX ORDER — MYCOPHENOLATE MOFETIL 500 MG/1
500 TABLET ORAL 2 TIMES DAILY
Status: DISCONTINUED | OUTPATIENT
Start: 2025-03-07 | End: 2025-03-10 | Stop reason: HOSPADM

## 2025-03-07 RX ORDER — NITROGLYCERIN 20 MG/100ML
10-200 INJECTION INTRAVENOUS CONTINUOUS
Status: DISCONTINUED | OUTPATIENT
Start: 2025-03-07 | End: 2025-03-09

## 2025-03-07 RX ORDER — HYDRALAZINE HYDROCHLORIDE 25 MG/1
25 TABLET, FILM COATED ORAL 2 TIMES DAILY
Status: DISCONTINUED | OUTPATIENT
Start: 2025-03-07 | End: 2025-03-08

## 2025-03-07 RX ORDER — OMEPRAZOLE 20 MG/1
20 CAPSULE, DELAYED RELEASE ORAL DAILY
Status: DISCONTINUED | OUTPATIENT
Start: 2025-03-08 | End: 2025-03-07

## 2025-03-07 RX ORDER — ASPIRIN 81 MG/1
81 TABLET, CHEWABLE ORAL DAILY
Status: DISCONTINUED | OUTPATIENT
Start: 2025-03-08 | End: 2025-03-09

## 2025-03-07 RX ORDER — NICOTINE POLACRILEX 4 MG
15-30 LOZENGE BUCCAL
Status: DISCONTINUED | OUTPATIENT
Start: 2025-03-07 | End: 2025-03-10 | Stop reason: HOSPADM

## 2025-03-07 RX ORDER — FLUTICASONE PROPIONATE 50 MCG
1 SPRAY, SUSPENSION (ML) NASAL DAILY PRN
Status: DISCONTINUED | OUTPATIENT
Start: 2025-03-08 | End: 2025-03-10 | Stop reason: HOSPADM

## 2025-03-07 RX ORDER — ASPIRIN 81 MG/1
324 TABLET, CHEWABLE ORAL ONCE
Status: COMPLETED | OUTPATIENT
Start: 2025-03-07 | End: 2025-03-07

## 2025-03-07 RX ORDER — ONDANSETRON 4 MG/1
4 TABLET, ORALLY DISINTEGRATING ORAL EVERY 6 HOURS PRN
Status: DISCONTINUED | OUTPATIENT
Start: 2025-03-07 | End: 2025-03-10 | Stop reason: HOSPADM

## 2025-03-07 RX ORDER — PANTOPRAZOLE SODIUM 40 MG/1
40 TABLET, DELAYED RELEASE ORAL DAILY
Status: DISCONTINUED | OUTPATIENT
Start: 2025-03-08 | End: 2025-03-10 | Stop reason: HOSPADM

## 2025-03-07 RX ORDER — AMOXICILLIN 250 MG
2 CAPSULE ORAL 2 TIMES DAILY PRN
Status: DISCONTINUED | OUTPATIENT
Start: 2025-03-07 | End: 2025-03-10 | Stop reason: HOSPADM

## 2025-03-07 RX ORDER — OXYCODONE HYDROCHLORIDE 5 MG/1
5 TABLET ORAL ONCE
Status: COMPLETED | OUTPATIENT
Start: 2025-03-07 | End: 2025-03-07

## 2025-03-07 RX ORDER — GUAIFENESIN 600 MG/1
1200 TABLET, EXTENDED RELEASE ORAL 2 TIMES DAILY
Status: DISCONTINUED | OUTPATIENT
Start: 2025-03-08 | End: 2025-03-10 | Stop reason: HOSPADM

## 2025-03-07 RX ORDER — CALCIUM CARBONATE 500 MG/1
1000 TABLET, CHEWABLE ORAL 4 TIMES DAILY PRN
Status: DISCONTINUED | OUTPATIENT
Start: 2025-03-07 | End: 2025-03-10 | Stop reason: HOSPADM

## 2025-03-07 RX ORDER — URSODIOL 250 MG/1
250 TABLET, FILM COATED ORAL 2 TIMES DAILY
Status: DISCONTINUED | OUTPATIENT
Start: 2025-03-07 | End: 2025-03-07

## 2025-03-07 RX ORDER — ALBUTEROL SULFATE 90 UG/1
2 INHALANT RESPIRATORY (INHALATION) EVERY 6 HOURS PRN
Status: DISCONTINUED | OUTPATIENT
Start: 2025-03-07 | End: 2025-03-10 | Stop reason: HOSPADM

## 2025-03-07 RX ORDER — DILTIAZEM HYDROCHLORIDE 240 MG/1
240 CAPSULE, COATED, EXTENDED RELEASE ORAL DAILY
Status: DISCONTINUED | OUTPATIENT
Start: 2025-03-08 | End: 2025-03-09

## 2025-03-07 RX ORDER — IOPAMIDOL 755 MG/ML
110 INJECTION, SOLUTION INTRAVASCULAR ONCE
Status: COMPLETED | OUTPATIENT
Start: 2025-03-07 | End: 2025-03-07

## 2025-03-07 RX ORDER — AMOXICILLIN 250 MG
1 CAPSULE ORAL 2 TIMES DAILY PRN
Status: DISCONTINUED | OUTPATIENT
Start: 2025-03-07 | End: 2025-03-10 | Stop reason: HOSPADM

## 2025-03-07 RX ORDER — ACETAMINOPHEN 650 MG/1
650 SUPPOSITORY RECTAL EVERY 4 HOURS PRN
Status: DISCONTINUED | OUTPATIENT
Start: 2025-03-07 | End: 2025-03-09

## 2025-03-07 RX ORDER — HEPARIN SODIUM 10000 [USP'U]/100ML
0-5000 INJECTION, SOLUTION INTRAVENOUS CONTINUOUS
Status: DISCONTINUED | OUTPATIENT
Start: 2025-03-07 | End: 2025-03-09

## 2025-03-07 RX ORDER — VITAMIN B COMPLEX
50 TABLET ORAL DAILY
Status: DISCONTINUED | OUTPATIENT
Start: 2025-03-08 | End: 2025-03-10 | Stop reason: HOSPADM

## 2025-03-07 RX ORDER — NITROGLYCERIN 0.4 MG/1
0.4 TABLET SUBLINGUAL EVERY 5 MIN PRN
Status: DISCONTINUED | OUTPATIENT
Start: 2025-03-07 | End: 2025-03-10 | Stop reason: HOSPADM

## 2025-03-07 RX ORDER — ONDANSETRON 2 MG/ML
4 INJECTION INTRAMUSCULAR; INTRAVENOUS EVERY 6 HOURS PRN
Status: DISCONTINUED | OUTPATIENT
Start: 2025-03-07 | End: 2025-03-10 | Stop reason: HOSPADM

## 2025-03-07 RX ORDER — ACETAMINOPHEN 325 MG/1
650 TABLET ORAL EVERY 4 HOURS PRN
Status: DISCONTINUED | OUTPATIENT
Start: 2025-03-07 | End: 2025-03-09

## 2025-03-07 RX ORDER — ATORVASTATIN CALCIUM 40 MG/1
40 TABLET, FILM COATED ORAL DAILY
Status: DISCONTINUED | OUTPATIENT
Start: 2025-03-08 | End: 2025-03-10 | Stop reason: HOSPADM

## 2025-03-07 RX ORDER — DEXTROSE MONOHYDRATE 25 G/50ML
25-50 INJECTION, SOLUTION INTRAVENOUS
Status: DISCONTINUED | OUTPATIENT
Start: 2025-03-07 | End: 2025-03-10 | Stop reason: HOSPADM

## 2025-03-07 RX ORDER — LIDOCAINE 40 MG/G
CREAM TOPICAL
Status: DISCONTINUED | OUTPATIENT
Start: 2025-03-07 | End: 2025-03-10 | Stop reason: HOSPADM

## 2025-03-07 RX ORDER — POLYETHYLENE GLYCOL 3350 17 G/17G
17 POWDER, FOR SOLUTION ORAL DAILY
Status: DISCONTINUED | OUTPATIENT
Start: 2025-03-08 | End: 2025-03-10 | Stop reason: HOSPADM

## 2025-03-07 RX ADMIN — INSULIN GLARGINE 10 UNITS: 100 INJECTION, SOLUTION SUBCUTANEOUS at 23:28

## 2025-03-07 RX ADMIN — ASPIRIN 324 MG: 81 TABLET, CHEWABLE ORAL at 13:46

## 2025-03-07 RX ADMIN — URSODIOL 250 MG: 250 TABLET, FILM COATED ORAL at 22:22

## 2025-03-07 RX ADMIN — HEPARIN SODIUM 1050 UNITS/HR: 10000 INJECTION, SOLUTION INTRAVENOUS at 18:50

## 2025-03-07 RX ADMIN — MAGNESIUM SULFATE HEPTAHYDRATE 2 G: 40 INJECTION, SOLUTION INTRAVENOUS at 16:52

## 2025-03-07 RX ADMIN — NITROGLYCERIN 10 MCG/MIN: 20 INJECTION INTRAVENOUS at 18:51

## 2025-03-07 RX ADMIN — NITROGLYCERIN 0.4 MG: 0.4 TABLET SUBLINGUAL at 13:47

## 2025-03-07 RX ADMIN — IOPAMIDOL 110 ML: 755 INJECTION, SOLUTION INTRAVENOUS at 15:30

## 2025-03-07 RX ADMIN — OXYCODONE HYDROCHLORIDE 5 MG: 5 TABLET ORAL at 15:00

## 2025-03-07 RX ADMIN — INSULIN ASPART 1 UNITS: 100 INJECTION, SOLUTION INTRAVENOUS; SUBCUTANEOUS at 23:26

## 2025-03-07 RX ADMIN — SODIUM CHLORIDE 500 ML: 0.9 INJECTION, SOLUTION INTRAVENOUS at 13:47

## 2025-03-07 RX ADMIN — MYCOPHENOLATE MOFETIL 500 MG: 500 TABLET, FILM COATED ORAL at 22:23

## 2025-03-07 ASSESSMENT — ACTIVITIES OF DAILY LIVING (ADL)
ADLS_ACUITY_SCORE: 59

## 2025-03-07 NOTE — PROGRESS NOTES
"  Assessment & Plan       Bilateral leg pain  Could be PAD but more likley pinched nerve in spine  - US JASON Doppler No Exercise; Future  - MR Lumbar Spine w/o Contrast; Future  - Orthopedic  Referral; Future  - oxyCODONE (ROXICODONE) 5 MG tablet; Take 1 tablet (5 mg) by mouth every 6 hours as needed for severe pain.    Type 2 diabetes mellitus with other circulatory complications (H)    - US JASON Doppler No Exercise; Future    WHile await tests will see if Sports Med can see in case other diagnoses to be considered    MED REC REQUIRED  Post Medication Reconciliation Status:   BMI  Estimated body mass index is 33.03 kg/m  as calculated from the following:    Height as of this encounter: 1.626 m (5' 4\").    Weight as of this encounter: 87.3 kg (192 lb 6.4 oz).   Weight management plan: Discussed healthy diet and exercise guidelines    The longitudinal plan of care for the diagnosis(es)/condition(s) as documented were addressed during this visit. Due to the added complexity in care, I will continue to support Loy in the subsequent management and with ongoing continuity of care.  32 minutes spent by me on the date of the encounter doing chart review, history and exam, documentation and further activities per the note    No follow-ups on file.    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/28/2018    BPH (benign prostatic hyperplasia)     Cancer (H) "     hepatocellualr carcinoma    Cirrhosis of liver (H) 2018    Diabetes (H)     Enlarged prostate     Hypothyroidism     Impotence of organic origin 2020    Inguinal hernia     Repaired with mesh on 18    Liver lesion 2018    Liver transplanted (H) 2018     donor liver transplant    Portal vein thrombosis     on path explant    Postoperative atrial fibrillation (H) 2018    Prostate cancer (H)     TB lung, latent     Treated      Past Surgical History:   Procedure Laterality Date    APPENDECTOMY      BRONCHOSCOPY (RIGID OR FLEXIBLE), DIAGNOSTIC N/A 2024    Procedure: BRONCHOSCOPY, WITH BRONCHOALVEOLAR LAVAGE;  Surgeon: Jimmy Farley MD;  Location:  GI    COLONOSCOPY          CV CORONARY ANGIOGRAM N/A 10/13/2021    Procedure: CV CORONARY ANGIOGRAM;  Surgeon: Yaya Hampton MD;  Location:  HEART CARDIAC CATH LAB    CV CORONARY LITHOTRIPSY PCI N/A 10/13/2021    Procedure: CV Coronary Lithotripsy PCI;  Surgeon: Yaya Hampton MD;  Location:  HEART CARDIAC CATH LAB    CV INSTANTANEOUS WAVE-FREE RATIO N/A 10/13/2021    Procedure: Instantaneous Wave-Free Ratio;  Surgeon: Yaya Hampton MD;  Location:  HEART CARDIAC CATH LAB    CV INTRAVASULAR ULTRASOUND N/A 10/13/2021    Procedure: Intravascular Ultrasound;  Surgeon: Yaya Hampton MD;  Location:  HEART CARDIAC CATH LAB    CV PCI STENT DRUG ELUTING N/A 10/13/2021    Procedure: Percutaneous Coronary Intervention Stent Drug Eluting;  Surgeon: Yaya Hampton MD;  Location:  HEART CARDIAC CATH LAB    DAVINCI PROSTATECTOMY N/A 2020    Procedure: Robot-assisted laparoscopic radical prostatectomy, Bladder biopsy;  Surgeon: Kenrick Casiano MD;  Location: [facility]    ENDOSCOPIC RETROGRADE CHOLANGIOPANCREATOGRAM N/A 2018    Procedure: ENDOSCOPIC RETROGRADE CHOLANGIOPANCREATOGRAM, with Billary Sphincterotomy and Billary  "Stent Placement;  Surgeon: Omero Lawler MD;  [facility]    ENDOSCOPIC RETROGRADE CHOLANGIOPANCREATOGRAM  2019    Procedure: COMBINED ENDOSCOPIC RETROGRADE CHOLANGIOPANCREATOGRAPHY, Bile Duct Stent Exchange;  Surgeon: Omero Lawler MD;  [facility]    ENDOSCOPIC RETROGRADE CHOLANGIOPANCREATOGRAM N/A 2019    Procedure: ENDOSCOPIC RETROGRADE CHOLANGIOPANCREATOGRAPHY, WITH BILIARY STENT REMOVAL AND STONE EXTRACTION;  Surgeon: Omero Lawler MD;  [facility]    HERNIORRHAPHY INGUINAL  2018    Procedure: Herniorrhaphy inguinal;  Surgeon: Emil Echevarria MD;  [facility]    IR LIVER BIOPSY PERCUTANEOUS  2018    LAPAROTOMY EXPLORATORY      per the patient \"for infection in the LLQ\"     PICC INSERTION Right 2024    47 cm basilic    TRANSPLANT LIVER RECIPIENT  DONOR N/A 2018    Procedure: TRANSPLANT LIVER RECIPIENT  DONOR;  Surgeon: Emil Echevarria MD;  [facility]     Current Outpatient Medications   Medication Sig Dispense Refill    albuterol (PROAIR HFA/PROVENTIL HFA/VENTOLIN HFA) 108 (90 Base) MCG/ACT inhaler Inhale 2 puffs into the lungs every 6 hours as needed for shortness of breath, wheezing or cough. 18 g 11    Alcohol Swabs PADS Use to swab the area of the injection or fam as directed Per insurance coverage 200 each 1    apixaban ANTICOAGULANT (ELIQUIS) 5 MG tablet Take 1 tablet (5 mg) by mouth 2 times daily 60 tablet 1    atorvastatin (LIPITOR) 40 MG tablet Take 1 tablet (40 mg) by mouth daily. 90 tablet 2    blood glucose (NO BRAND SPECIFIED) lancets standard Use to test blood sugar 2 times daily or as directed. 200 each 3    blood glucose (NO BRAND SPECIFIED) test strip Use to test blood sugar 2 times daily as directed. 200 strip 3    blood glucose monitoring (NO BRAND SPECIFIED) meter device kit Use to test blood sugar 2 times daily or as directed. 1 kit 0    diltiazem ER COATED BEADS (CARDIZEM " CD/CARTIA XT) 240 MG 24 hr capsule Take 1 capsule (240 mg) by mouth daily. 90 capsule 3    fluticasone (FLONASE) 50 MCG/ACT nasal spray Spray 1 spray into both nostrils daily. 16 g 5    guaiFENesin (MUCINEX) 600 MG 12 hr tablet Take 2 tablets (1,200 mg) by mouth 2 times daily. 20 tablet 1    hydrALAZINE (APRESOLINE) 25 MG tablet Take 1 tablet (25 mg) by mouth 2 times daily. 180 tablet 3    insulin glargine (LANTUS SOLOSTAR) 100 UNIT/ML pen Inject 20 Units Subcutaneous at bedtime 15 mL 1    insulin pen needle (31G X 5 MM) 31G X 5 MM miscellaneous Use one  pen needles daily or as directed. 100 each 3    levothyroxine (SYNTHROID/LEVOTHROID) 150 MCG tablet Take 1 tablet (150 mcg) by mouth daily. 90 tablet 3    metFORMIN (GLUCOPHAGE XR) 500 MG 24 hr tablet Take 2 tablets (1,000 mg) by mouth 2 times daily (with meals). 360 tablet 3    mycophenolate (GENERIC EQUIVALENT) 500 MG tablet Take 1 tablet (500 mg) by mouth 2 times daily 180 tablet 3    omeprazole (PRILOSEC) 20 MG DR capsule Take 1 capsule (20 mg) by mouth daily. 14 capsule 0    oxyCODONE (ROXICODONE) 5 MG tablet Take 1 tablet (5 mg) by mouth every 6 hours as needed for severe pain. 12 tablet 0    predniSONE (DELTASONE) 5 MG tablet Take 1 tablet (5 mg) by mouth daily 90 tablet 1    ursodiol (ACTIGALL) 250 MG tablet Take 1 tablet (250 mg) by mouth 2 times daily 180 tablet 3    Vitamin D3 (CHOLECALCIFEROL) 25 mcg (1000 units) tablet Take 2 tablets (50 mcg) by mouth daily 180 tablet 3     No current facility-administered medications for this visit.     No Known Allergies  Family History   Problem Relation Age of Onset    Memory loss Mother     Liver Disease Brother     Skin Cancer No family hx of     Melanoma No family hx of      Social History     Socioeconomic History    Marital status:      Spouse name: Not on file    Number of children: Not on file    Years of education: Not on file    Highest education level: Not on file   Occupational History    Not on  "file   Tobacco Use    Smoking status: Former     Current packs/day: 0.00     Average packs/day: (1.4 ttl pk-yrs)     Types: Cigarettes, Cigars     Start date: 1970     Quit date: 3/14/2018     Years since quittin.9    Smokeless tobacco: Never    Tobacco comments:     Quit smoking 2018, one cigarette after dinner   Vaping Use    Vaping status: Never Used   Substance and Sexual Activity    Alcohol use: No     Comment: Quit drinking 2018    Drug use: No    Sexual activity: Not on file   Other Topics Concern    Parent/sibling w/ CABG, MI or angioplasty before 65F 55M? Not Asked   Social History Narrative    Born in [place], came to [place] years ago.     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cardboard, trimming meats     Social Drivers of Health     Financial Resource Strain: Not on file   Food Insecurity: Not on file   Transportation Needs: Not on file   Physical Activity: Not on file   Stress: Not on file   Social Connections: Not on file   Interpersonal Safety: Low Risk  (3/6/2025)    Interpersonal Safety     Do you feel physically and emotionally safe where you currently live?: Yes     Within the past 12 months, have you been hit, slapped, kicked or otherwise physically hurt by someone?: No     Within the past 12 months, have you been humiliated or emotionally abused in other ways by your partner or ex-partner?: No   Housing Stability: Not on file     Feet are not blue or having wounds      Objective    BP (!) 158/86 (BP Location: Right arm, Patient Position: Sitting, Cuff Size: Adult Regular)   Pulse 92   Temp 98  F (36.7  C) (Oral)   Resp 18   Ht 1.626 m (5' 4\")   Wt 87.3 kg (192 lb 6.4 oz)   SpO2 96%   BMI 33.03 kg/m    Body mass index is 33.03 kg/m .  Physical Exam   GENERAL: alert and no distress  MS: no gross musculoskeletal defects noted, no edema  Both feet normal color and temp pulses hard to palpate no wounds  Both calves no swelling or red or warm not tender  No edema noted  As soon as " stands he reports pain in calves            Signed Electronically by: Jaswinder Anne MD

## 2025-03-07 NOTE — ED TRIAGE NOTES
"Triage Assessment & Note:    BP (!) 211/105   Pulse 120   Temp 97.4  F (36.3  C) (Oral)   Resp 20   Ht 1.626 m (5' 4\")   Wt 88 kg (194 lb 1.6 oz)   SpO2 95%   BMI 33.32 kg/m        Patient presents with: Transplant pt comes ambulatory to triage with reports of terrible chest/ back pain  and hypertension. No reports of fever, cough, or travel.     Home Treatments/Remedies: home medications    Febrile / Afebrile: afebrile    Duration of C/o: < 3 hrs    Nina Winston RN  March 7, 2025            "

## 2025-03-07 NOTE — ED PROVIDER NOTES
[place] EMERGENCY DEPARTMENT ([facility])    3/07/25       ED PROVIDER NOTE    History     Chief Complaint   Patient presents with    Back Pain    Chest Pain    Hypertension     The history is provided by the patient and medical records.  used: CymroChastity Limon is a 69 year old male with a history notable for chronic A-fib (on diltiazem and Eliquis), HTN, alcoholic cirrhosis complicated by HCC s/p liver transplant in 2018, T2DM, hypothyroidism who presents to the ED with chest pain.  Patient reports chest pain that radiates to his back beginning 3 hours prior to arrival.  He denies history of similar pain.  He describes the chest pain as pressure with a pinching pain in his back.  Rates it an 8 out of 10 and radiating to his back.  He reports the pain began when he was in the process of taking his medications.  He reports he got up to take his meds and then while swallowing them he began having pain in his throat and shortly after developed chest pain.  He reports feeling like something is scratching his throat.  He endorses some shortness of breath just prior to arrival.  He is rather hypertensive here in the ED at 211/105 upon triage, he reports extensive history of hypertension.  He reports his blood pressure at home is usually in the 140s-160s.  He takes his blood pressure meds consistently but has not been able to take this today.  No aspirin or nitroglycerin prior to arrival.    Past Medical History  Past Medical History:   Diagnosis Date    Anemia     Biliary stricture of transplanted liver (H) 2018    BPH (benign prostatic hyperplasia)     Cancer (H)     hepatocellualr carcinoma    Cirrhosis of liver (H) 2018    Diabetes (H)     Enlarged prostate     Hypothyroidism     Impotence of organic origin 2020    Inguinal hernia     Repaired with mesh on 18    Liver lesion 2018    Liver transplanted (H) 2018     donor liver  transplant    Portal vein thrombosis     on path explant    Postoperative atrial fibrillation (H) 12/25/2018    Prostate cancer (H)     TB lung, latent     Treated      Past Surgical History:   Procedure Laterality Date    APPENDECTOMY      BRONCHOSCOPY (RIGID OR FLEXIBLE), DIAGNOSTIC N/A 03/05/2024    Procedure: BRONCHOSCOPY, WITH BRONCHOALVEOLAR LAVAGE;  Surgeon: Jimmy Farley MD;  Location:  GI    COLONOSCOPY      2018    CV CORONARY ANGIOGRAM N/A 10/13/2021    Procedure: CV CORONARY ANGIOGRAM;  Surgeon: Yaya Hampton MD;  Location:  HEART CARDIAC CATH LAB    CV CORONARY LITHOTRIPSY PCI N/A 10/13/2021    Procedure: CV Coronary Lithotripsy PCI;  Surgeon: Yaya Hampton MD;  Location:  HEART CARDIAC CATH LAB    CV INSTANTANEOUS WAVE-FREE RATIO N/A 10/13/2021    Procedure: Instantaneous Wave-Free Ratio;  Surgeon: Yaya Hampton MD;  Location:  HEART CARDIAC CATH LAB    CV INTRAVASULAR ULTRASOUND N/A 10/13/2021    Procedure: Intravascular Ultrasound;  Surgeon: Yaya Hampton MD;  Location:  HEART CARDIAC CATH LAB    CV PCI STENT DRUG ELUTING N/A 10/13/2021    Procedure: Percutaneous Coronary Intervention Stent Drug Eluting;  Surgeon: Yaya Hampton MD;  Location:  HEART CARDIAC CATH LAB    DAVINCI PROSTATECTOMY N/A 01/03/2020    Procedure: Robot-assisted laparoscopic radical prostatectomy, Bladder biopsy;  Surgeon: Kenrick Casiano MD;  Location:  OR    ENDOSCOPIC RETROGRADE CHOLANGIOPANCREATOGRAM N/A 12/28/2018    Procedure: ENDOSCOPIC RETROGRADE CHOLANGIOPANCREATOGRAM, with Billary Sphincterotomy and Billary Stent Placement;  Surgeon: Omero Lawler MD;  [facility]    ENDOSCOPIC RETROGRADE CHOLANGIOPANCREATOGRAM  02/18/2019    Procedure: COMBINED ENDOSCOPIC RETROGRADE CHOLANGIOPANCREATOGRAPHY, Bile Duct Stent Exchange;  Surgeon: Omero Lawler MD;  [facility]    ENDOSCOPIC RETROGRADE  "CHOLANGIOPANCREATOGRAM N/A 2019    Procedure: ENDOSCOPIC RETROGRADE CHOLANGIOPANCREATOGRAPHY, WITH BILIARY STENT REMOVAL AND STONE EXTRACTION;  Surgeon: Omero Lawler MD;  [facility]    HERNIORRHAPHY INGUINAL  2018    Procedure: Herniorrhaphy inguinal;  Surgeon: Emil Echevarria MD;  [facility]    IR LIVER BIOPSY PERCUTANEOUS  2018    LAPAROTOMY EXPLORATORY      per the patient \"for infection in the LLQ\"     PICC INSERTION Right 2024    47 cm basilic    TRANSPLANT LIVER RECIPIENT  DONOR N/A 2018    Procedure: TRANSPLANT LIVER RECIPIENT  DONOR;  Surgeon: Emil Echevarria MD;  [facility]     albuterol (PROAIR HFA/PROVENTIL HFA/VENTOLIN HFA) 108 (90 Base) MCG/ACT inhaler  Alcohol Swabs PADS  apixaban ANTICOAGULANT (ELIQUIS) 5 MG tablet  atorvastatin (LIPITOR) 40 MG tablet  blood glucose (NO BRAND SPECIFIED) lancets standard  blood glucose (NO BRAND SPECIFIED) test strip  blood glucose monitoring (NO BRAND SPECIFIED) meter device kit  diltiazem ER COATED BEADS (CARDIZEM CD/CARTIA XT) 240 MG 24 hr capsule  fluticasone (FLONASE) 50 MCG/ACT nasal spray  guaiFENesin (MUCINEX) 600 MG 12 hr tablet  hydrALAZINE (APRESOLINE) 25 MG tablet  insulin glargine (LANTUS SOLOSTAR) 100 UNIT/ML pen  insulin pen needle (31G X 5 MM) 31G X 5 MM miscellaneous  levothyroxine (SYNTHROID/LEVOTHROID) 150 MCG tablet  metFORMIN (GLUCOPHAGE XR) 500 MG 24 hr tablet  mycophenolate (GENERIC EQUIVALENT) 500 MG tablet  omeprazole (PRILOSEC) 20 MG DR capsule  oxyCODONE (ROXICODONE) 5 MG tablet  predniSONE (DELTASONE) 5 MG tablet  ursodiol (ACTIGALL) 250 MG tablet  Vitamin D3 (CHOLECALCIFEROL) 25 mcg (1000 units) tablet      No Known Allergies  Family History  Family History   Problem Relation Age of Onset    Memory loss Mother     Liver Disease Brother     Skin Cancer No family hx of     Melanoma No family hx of      Social History   Social History     Tobacco Use    Smoking " "status: Former     Current packs/day: 0.00     Average packs/day: (1.4 ttl pk-yrs)     Types: Cigarettes, Cigars     Start date: 1970     Quit date: 3/14/2018     Years since quittin.9    Smokeless tobacco: Never    Tobacco comments:     Quit smoking 2018, one cigarette after dinner   Vaping Use    Vaping status: Never Used   Substance Use Topics    Alcohol use: No     Comment: Quit drinking 2018    Drug use: No      Past medical history, past surgical history, medications, allergies, family history, and social history were reviewed with the patient. No additional pertinent items.     A complete review of systems was performed with pertinent positives and negatives noted in the HPI, and all other systems negative.    Physical Exam   BP: (!) 211/105  Pulse: 120  Temp: 97.4  F (36.3  C)  Resp: 20  Height: 162.6 cm (5' 4\")  Weight: 88 kg (194 lb 1.6 oz)  SpO2: 95 %    Physical Exam  General: awake, alert, anxious and uncomfortable appearing mildly diaphoretic  Head: normal cephalic  HEENT: pupils equal, conjugate gaze intact  Neck: Supple  CV: regular rate and rhythm without murmur, pulses equal in all extremities  Lungs: clear to auscultation  Abd: soft, non-tender, no guarding, no peritoneal signs.  Large well-healed surgical incision in his abdomen  EXT: lower extremities without swelling or edema  Neuro: awake, answers questions appropriately. No focal deficits noted     ED Course, Procedures, & Data      Procedures       No results found for any visits on 25.  Medications - No data to display  Labs Ordered and Resulted from Time of ED Arrival to Time of ED Departure - No data to display  No orders to display          Critical care: This patient presented with severe hypertension chest pain undifferentiated and therefore is critically ill.  He is placed in the stabilization room.  Time was spent at bedside time evaluating the patient.  Ordering test.  He evaluating tests, EKGs, reassessing " patient's blood pressures.  Initiating medications with aspirin heparin and coordinating care.  Time was also spent documenting.  Total critical time care time for this patient was 70 minutes.    Assessment & Plan    Patient was placed in stabilization for hypertension and severe chest pain rating to the back differential includes ACS, STEMI, aortic dissection, pneumothorax, hypertensive emergency.    EKG shows atrial fibrillation with some T wave inversions but no ST elevation MI.  His initial troponin was mildly elevated on i-STAT.  He was given nitroglycerin and aspirin for which she states did improve his pain.    Basic labs were obtained and pending.  Including a type and screen and a large bore IV to facilitate imaging of the aorta.    Bedside echo difficult to interpret EF based on active atrial fibrillation, no pericardial effusion noted.  Chambers appeared grossly normal.    On reassessment patient reports that his pain was improved.  His heart rate and blood pressure were both improved after nitroglycerin.  troponin is still pending at this time.  Patient was signed out to Dr. Knott who will follow-up on his troponin and CTA results.  Will anticipate if CTA is negative patient will need cardiology consult for T wave inversions and elevated i-STAT troponin.  Final disposition and diagnosis pending at this time    I have reviewed the nursing notes. I have reviewed the findings, diagnosis, plan and need for follow up with the patient.    I have also reviewed and interpreted all laboratory and imaging studies that are available.    New Prescriptions    No medications on file         I, Reagan Hudson, am serving as a trained medical scribe to document services personally performed by Akhil Coughlin MD based on the provider's statements to me on March 7, 2025.  This document has been checked and approved by the attending provider.    I, Akhil Coughlin MD, was physically present and have reviewed and verified the  accuracy of this note documented by Reagan Hudson medical scribe.      Akhil Barrera MD  [facility] EMERGENCY DEPARTMENT  3/7/2025     Akhil Barrera MD  03/08/25 [id number]

## 2025-03-08 ENCOUNTER — APPOINTMENT (OUTPATIENT)
Dept: CARDIOLOGY | Facility: CLINIC | Age: 70
DRG: 280 | End: 2025-03-08
Payer: COMMERCIAL

## 2025-03-08 LAB
ALBUMIN SERPL BCG-MCNC: 2.9 G/DL (ref 3.5–5.2)
ALP SERPL-CCNC: 104 U/L (ref 40–150)
ALT SERPL W P-5'-P-CCNC: 40 U/L (ref 0–70)
ANION GAP SERPL CALCULATED.3IONS-SCNC: 11 MMOL/L (ref 7–15)
APTT PPP: 58 SECONDS (ref 22–38)
APTT PPP: 69 SECONDS (ref 22–38)
APTT PPP: 73 SECONDS (ref 22–38)
AST SERPL W P-5'-P-CCNC: 251 U/L (ref 0–45)
ATRIAL RATE - MUSE: NORMAL BPM
BACTERIA UR CULT: NORMAL
BILIRUB SERPL-MCNC: 0.9 MG/DL
BUN SERPL-MCNC: 17.1 MG/DL (ref 8–23)
CALCIUM SERPL-MCNC: 8.5 MG/DL (ref 8.8–10.4)
CHLORIDE SERPL-SCNC: 101 MMOL/L (ref 98–107)
CREAT SERPL-MCNC: 0.94 MG/DL (ref 0.67–1.17)
DIASTOLIC BLOOD PRESSURE - MUSE: NORMAL MMHG
EGFRCR SERPLBLD CKD-EPI 2021: 87 ML/MIN/1.73M2
ERYTHROCYTE [DISTWIDTH] IN BLOOD BY AUTOMATED COUNT: 13.1 % (ref 10–15)
GLUCOSE BLDC GLUCOMTR-MCNC: 157 MG/DL (ref 70–99)
GLUCOSE BLDC GLUCOMTR-MCNC: 166 MG/DL (ref 70–99)
GLUCOSE BLDC GLUCOMTR-MCNC: 170 MG/DL (ref 70–99)
GLUCOSE BLDC GLUCOMTR-MCNC: 174 MG/DL (ref 70–99)
GLUCOSE BLDC GLUCOMTR-MCNC: 184 MG/DL (ref 70–99)
GLUCOSE BLDC GLUCOMTR-MCNC: 188 MG/DL (ref 70–99)
GLUCOSE BLDC GLUCOMTR-MCNC: 292 MG/DL (ref 70–99)
GLUCOSE SERPL-MCNC: 191 MG/DL (ref 70–99)
HCO3 SERPL-SCNC: 23 MMOL/L (ref 22–29)
HCT VFR BLD AUTO: 42.7 % (ref 40–53)
HGB BLD-MCNC: 14.2 G/DL (ref 13.3–17.7)
HOLD SPECIMEN: NORMAL
INTERPRETATION ECG - MUSE: NORMAL
LVEF ECHO: NORMAL
MCH RBC QN AUTO: 28.1 PG (ref 26.5–33)
MCHC RBC AUTO-ENTMCNC: 33.3 G/DL (ref 31.5–36.5)
MCV RBC AUTO: 84 FL (ref 78–100)
P AXIS - MUSE: NORMAL DEGREES
PLATELET # BLD AUTO: 220 10E3/UL (ref 150–450)
POTASSIUM SERPL-SCNC: 4.3 MMOL/L (ref 3.4–5.3)
PR INTERVAL - MUSE: NORMAL MS
PROT SERPL-MCNC: 5.3 G/DL (ref 6.4–8.3)
QRS DURATION - MUSE: 86 MS
QRS DURATION - MUSE: 88 MS
QRS DURATION - MUSE: 90 MS
QT - MUSE: 368 MS
QT - MUSE: 384 MS
QT - MUSE: 386 MS
QT - MUSE: 394 MS
QT - MUSE: 400 MS
QTC - MUSE: 456 MS
QTC - MUSE: 458 MS
QTC - MUSE: 462 MS
QTC - MUSE: 490 MS
QTC - MUSE: 497 MS
R AXIS - MUSE: -11 DEGREES
R AXIS - MUSE: -12 DEGREES
R AXIS - MUSE: -25 DEGREES
R AXIS - MUSE: -32 DEGREES
R AXIS - MUSE: 7 DEGREES
RBC # BLD AUTO: 5.06 10E6/UL (ref 4.4–5.9)
SODIUM SERPL-SCNC: 135 MMOL/L (ref 135–145)
SYSTOLIC BLOOD PRESSURE - MUSE: NORMAL MMHG
T AXIS - MUSE: 129 DEGREES
T AXIS - MUSE: 132 DEGREES
T AXIS - MUSE: 133 DEGREES
T AXIS - MUSE: 144 DEGREES
TROPONIN T SERPL HS-MCNC: 4342 NG/L
TROPONIN T SERPL HS-MCNC: 5154 NG/L
TROPONIN T SERPL HS-MCNC: 5303 NG/L
TROPONIN T SERPL HS-MCNC: 5460 NG/L
VENTRICULAR RATE- MUSE: 79 BPM
VENTRICULAR RATE- MUSE: 85 BPM
VENTRICULAR RATE- MUSE: 95 BPM
VENTRICULAR RATE- MUSE: 96 BPM
VENTRICULAR RATE- MUSE: 97 BPM
WBC # BLD AUTO: 8.8 10E3/UL (ref 4–11)

## 2025-03-08 PROCEDURE — 250N000011 HC RX IP 250 OP 636: Performed by: INTERNAL MEDICINE

## 2025-03-08 PROCEDURE — 36415 COLL VENOUS BLD VENIPUNCTURE: CPT | Performed by: INTERNAL MEDICINE

## 2025-03-08 PROCEDURE — 36415 COLL VENOUS BLD VENIPUNCTURE: CPT

## 2025-03-08 PROCEDURE — 99233 SBSQ HOSP IP/OBS HIGH 50: CPT | Mod: 25 | Performed by: INTERNAL MEDICINE

## 2025-03-08 PROCEDURE — C8929 TTE W OR WO FOL WCON,DOPPLER: HCPCS

## 2025-03-08 PROCEDURE — 250N000011 HC RX IP 250 OP 636

## 2025-03-08 PROCEDURE — 82962 GLUCOSE BLOOD TEST: CPT

## 2025-03-08 PROCEDURE — 82310 ASSAY OF CALCIUM: CPT

## 2025-03-08 PROCEDURE — 250N000013 HC RX MED GY IP 250 OP 250 PS 637: Performed by: INTERNAL MEDICINE

## 2025-03-08 PROCEDURE — 80053 COMPREHEN METABOLIC PANEL: CPT

## 2025-03-08 PROCEDURE — 85730 THROMBOPLASTIN TIME PARTIAL: CPT | Performed by: INTERNAL MEDICINE

## 2025-03-08 PROCEDURE — 250N000013 HC RX MED GY IP 250 OP 250 PS 637

## 2025-03-08 PROCEDURE — 84484 ASSAY OF TROPONIN QUANT: CPT

## 2025-03-08 PROCEDURE — 93306 TTE W/DOPPLER COMPLETE: CPT | Mod: 26 | Performed by: INTERNAL MEDICINE

## 2025-03-08 PROCEDURE — 120N000005 HC R&B MS OVERFLOW UMMC

## 2025-03-08 PROCEDURE — 93005 ELECTROCARDIOGRAM TRACING: CPT

## 2025-03-08 PROCEDURE — 93010 ELECTROCARDIOGRAM REPORT: CPT | Performed by: INTERNAL MEDICINE

## 2025-03-08 PROCEDURE — 255N000002 HC RX 255 OP 636: Performed by: INTERNAL MEDICINE

## 2025-03-08 PROCEDURE — 85018 HEMOGLOBIN: CPT

## 2025-03-08 PROCEDURE — 999N000208 ECHOCARDIOGRAM COMPLETE

## 2025-03-08 PROCEDURE — 99254 IP/OBS CNSLTJ NEW/EST MOD 60: CPT | Performed by: STUDENT IN AN ORGANIZED HEALTH CARE EDUCATION/TRAINING PROGRAM

## 2025-03-08 PROCEDURE — 250N000012 HC RX MED GY IP 250 OP 636 PS 637

## 2025-03-08 RX ORDER — MAGNESIUM OXIDE 400 MG/1
400 TABLET ORAL EVERY 4 HOURS
Status: COMPLETED | OUTPATIENT
Start: 2025-03-08 | End: 2025-03-08

## 2025-03-08 RX ORDER — HYDRALAZINE HYDROCHLORIDE 25 MG/1
25 TABLET, FILM COATED ORAL 2 TIMES DAILY
Status: DISCONTINUED | OUTPATIENT
Start: 2025-03-08 | End: 2025-03-10 | Stop reason: HOSPADM

## 2025-03-08 RX ADMIN — INSULIN ASPART 1 UNITS: 100 INJECTION, SOLUTION INTRAVENOUS; SUBCUTANEOUS at 06:37

## 2025-03-08 RX ADMIN — HYDRALAZINE HYDROCHLORIDE 25 MG: 25 TABLET ORAL at 20:03

## 2025-03-08 RX ADMIN — LEVOTHYROXINE SODIUM 150 MCG: 0.07 TABLET ORAL at 08:14

## 2025-03-08 RX ADMIN — MAGNESIUM OXIDE TAB 400 MG (241.3 MG ELEMENTAL MG) 400 MG: 400 (241.3 MG) TAB at 02:45

## 2025-03-08 RX ADMIN — PREDNISONE 5 MG: 5 TABLET ORAL at 08:16

## 2025-03-08 RX ADMIN — HUMAN ALBUMIN MICROSPHERES AND PERFLUTREN 6 ML: 10; .22 INJECTION, SOLUTION INTRAVENOUS at 07:38

## 2025-03-08 RX ADMIN — Medication 50 MCG: at 08:15

## 2025-03-08 RX ADMIN — NITROGLYCERIN 80 MCG/MIN: 20 INJECTION INTRAVENOUS at 17:26

## 2025-03-08 RX ADMIN — ASPIRIN 81 MG: 81 TABLET, CHEWABLE ORAL at 08:16

## 2025-03-08 RX ADMIN — ATORVASTATIN CALCIUM 40 MG: 40 TABLET, FILM COATED ORAL at 08:15

## 2025-03-08 RX ADMIN — INSULIN ASPART 4 UNITS: 100 INJECTION, SOLUTION INTRAVENOUS; SUBCUTANEOUS at 20:05

## 2025-03-08 RX ADMIN — NITROGLYCERIN 80 MCG/MIN: 20 INJECTION INTRAVENOUS at 07:43

## 2025-03-08 RX ADMIN — PANTOPRAZOLE SODIUM 40 MG: 40 TABLET, DELAYED RELEASE ORAL at 08:16

## 2025-03-08 RX ADMIN — GUAIFENESIN 1200 MG: 600 TABLET ORAL at 20:03

## 2025-03-08 RX ADMIN — INSULIN ASPART 1 UNITS: 100 INJECTION, SOLUTION INTRAVENOUS; SUBCUTANEOUS at 23:36

## 2025-03-08 RX ADMIN — MAGNESIUM OXIDE TAB 400 MG (241.3 MG ELEMENTAL MG) 400 MG: 400 (241.3 MG) TAB at 06:39

## 2025-03-08 RX ADMIN — MYCOPHENOLATE MOFETIL 500 MG: 500 TABLET, FILM COATED ORAL at 08:14

## 2025-03-08 RX ADMIN — INSULIN GLARGINE 10 UNITS: 100 INJECTION, SOLUTION SUBCUTANEOUS at 22:27

## 2025-03-08 RX ADMIN — POLYETHYLENE GLYCOL 3350 17 G: 17 POWDER, FOR SOLUTION ORAL at 08:16

## 2025-03-08 RX ADMIN — MYCOPHENOLATE MOFETIL 500 MG: 500 TABLET, FILM COATED ORAL at 20:03

## 2025-03-08 RX ADMIN — GUAIFENESIN 1200 MG: 600 TABLET ORAL at 08:12

## 2025-03-08 RX ADMIN — HYDRALAZINE HYDROCHLORIDE 25 MG: 25 TABLET ORAL at 08:12

## 2025-03-08 RX ADMIN — HYDRALAZINE HYDROCHLORIDE 25 MG: 25 TABLET ORAL at 02:45

## 2025-03-08 RX ADMIN — DILTIAZEM HYDROCHLORIDE 240 MG: 240 CAPSULE, EXTENDED RELEASE ORAL at 14:50

## 2025-03-08 RX ADMIN — INSULIN ASPART 1 UNITS: 100 INJECTION, SOLUTION INTRAVENOUS; SUBCUTANEOUS at 12:38

## 2025-03-08 RX ADMIN — INSULIN ASPART 1 UNITS: 100 INJECTION, SOLUTION INTRAVENOUS; SUBCUTANEOUS at 02:43

## 2025-03-08 RX ADMIN — HEPARIN SODIUM 1050 UNITS/HR: 10000 INJECTION, SOLUTION INTRAVENOUS at 14:51

## 2025-03-08 ASSESSMENT — ACTIVITIES OF DAILY LIVING (ADL)
ADLS_ACUITY_SCORE: 59
ADLS_ACUITY_SCORE: 36
ADLS_ACUITY_SCORE: 36
ADLS_ACUITY_SCORE: 59
ADLS_ACUITY_SCORE: 38
ADLS_ACUITY_SCORE: 59
ADLS_ACUITY_SCORE: 38
ADLS_ACUITY_SCORE: 38
ADLS_ACUITY_SCORE: 59
ADLS_ACUITY_SCORE: 38
ADLS_ACUITY_SCORE: 59
ADLS_ACUITY_SCORE: 59
ADLS_ACUITY_SCORE: 38
ADLS_ACUITY_SCORE: 59

## 2025-03-08 NOTE — ED PROVIDER NOTES
"Emergency Department I-PASS Sign-out      Illness Severity: \"Watcher\"    Patient Summary:  69 year old male with pertinent PMH of chronic A-fib (on diltiazem and Eliquis), HTN, alcoholic cirrhosis complicated by HCC s/p liver transplant in 2018, T2DM, hypothyroidism who presented with chest pain.     ED Course/treatment plan: EKG, Labs, bedside echo, chest x-ray, CTA chest abdomen.     Clinical Impression:  No diagnosis found.    Edited by: Katherine No at 3/7/2025 1611    Action List:  -To do:      Situational Awareness & Contingency Planning:  Code Status (Most recent):  Prior    Disposition:  admitted to the [facility] 1 service      Synthesis & Events after sign-out:  CTA neg for dissection, very elevated trop, heparin gtt and NTG with improving CP        Catarino Knott MD, MD   Emergency Medicine     Catarino Knott MD  03/07/25 0741    "

## 2025-03-08 NOTE — CONSULTS
GASTROENTEROLOGY CONSULTATION      Date of Admission:  3/7/2025           Reason for Consultation:   We were asked  to evaluate this patient for immunosuppression management           ASSESSMENT AND RECOMMENDATIONS:   Assessment:  69 year old male with a history  of alcoholic cirrhosis complicated by hepatocellular carcinoma status post breast therapy with TACE and disease during donor liver transplantation in 2018 who is admitted for NSTEMI for which hepatology is consulted for immunosuppression management.     # Alcoholic cirrhosis complicated by HCC status post TACE and DDLT in 2018  # No history of rejection    Patient has been very compliant with his medications and is currently 7 years posttransplantation without any recurrence of HCC.  He is currently on heparin drip, nitroglycerin drip for NSTEMI    Recommendations  -Continue mycophenolate 500 mg twice daily and prednisone 5 mg daily  -daily LFTs  -No change in immunosuppression management as of now      Gastroenterology outpatient follow up recommendations: As previously stated scheduled    Thank you for involving us in this patient's care. Please do not hesitate to contact the GI service with any questions or concerns.     Pt care plan discussed with Dr. Galvan, GI staff physician.    Hai Hernandez MD      Physician Attestation   I saw this patient with the resident and agree with the resident/fellow's findings and plan of care as documented in the note.      Please see A&P for additional details of medical decision making.    I have personally reviewed the following data over the past 24 hrs:    8.8  \   14.2   / 220     135 101 17.1 /  157 (H)   4.3 23 0.94 \     ALT: 40 AST: 251 (H) AP: 104 TBILI: 0.9   ALB: 2.9 (L) TOT PROTEIN: 5.3 (L) LIPASE: N/A     Trop: 5,154 (HH) BNP: 12,624 (H)     INR:  N/A PTT:  73 (H)   D-dimer:  N/A Fibrinogen:  N/A         Izabela Galvan MD  Date of Service (when I saw the patient):  03/08/25    -------------------------------------------------------------------------------------------------------------------           History of Present Illness:   Loy Limon is a 69 year old male with a history of alcoholic cirrhosis complicated by hepatocellular carcinoma status post breast therapy with TACE and disease during donor liver transplantation in 2018 for which hepatology is consulted for immunosuppression management.    He presented to the hospital for 9 out of 10 chest pain and was found to have new onset of T wave inversions along with elevated troponin.  He was started on nitroglycerin drip and heparin drip and admitted to cardiology service.     At the time of my interview, he continued to endorse some chest pain.  Denied any abdominal pain or changes in bowel habits.         Data:   Key relevant labs:     Key relevant imaging:           Previous Endoscopy:   See HPI         Medications:     Current Facility-Administered Medications   Medication Dose Route Frequency Provider Last Rate Last Admin    acetaminophen (TYLENOL) tablet 650 mg  650 mg Oral Q4H PRN Nori Mack MD        Or    acetaminophen (TYLENOL) Suppository 650 mg  650 mg Rectal Q4H PRN Nori Mack MD        albuterol (PROVENTIL HFA/VENTOLIN HFA) inhaler  2 puff Inhalation Q6H PRN Nori Mack MD        [Held by provider] apixaban ANTICOAGULANT (ELIQUIS) tablet 5 mg  5 mg Oral BID Nori Mack MD        aspirin (ASA) chewable tablet 81 mg  81 mg Oral Daily Nori Mack MD   81 mg at 03/08/25 0816    atorvastatin (LIPITOR) tablet 40 mg  40 mg Oral Daily Nori Mack MD   40 mg at 03/08/25 0815    benzocaine-menthol (CHLORASEPTIC MAX) 15-10 MG lozenge 1 lozenge  1 lozenge Buccal Q1H PRN Nori Mack MD        calcium carbonate (TUMS) chewable tablet 1,000 mg  1,000 mg Oral 4x Daily PRN Nori Mack MD        glucose gel 15-30 g  15-30 g Oral Q15 Min PRN Nori Mack MD        Or    dextrose  50 % injection 25-50 mL  25-50 mL Intravenous Q15 Min PRN Nori Mack MD        Or    glucagon injection 1 mg  1 mg Subcutaneous Q15 Min PRN Nori Mack MD        [Held by provider] diltiazem ER COATED BEADS (CARDIZEM CD/CARTIA XT) 24 hr capsule 240 mg  240 mg Oral Daily Cora Augustine APRN CNP        fluticasone (FLONASE) 50 MCG/ACT spray 1 spray  1 spray Both Nostrils Daily PRN Nori Mack MD        guaiFENesin (MUCINEX) 12 hr tablet 1,200 mg  1,200 mg Oral BID Nori Mack MD   1,200 mg at 03/08/25 0812    heparin 25,000 units in 0.45% NaCl 250 mL ANTICOAGULANT infusion  0-5,000 Units/hr Intravenous Continuous Catarino Knott MD 10.5 mL/hr at 03/08/25 0854 1,050 Units/hr at 03/08/25 0854    hydrALAZINE (APRESOLINE) tablet 25 mg  25 mg Oral BID Nori Mack MD   25 mg at 03/08/25 0812    insulin aspart (NovoLOG) injection (RAPID ACTING)  1-7 Units Subcutaneous Q4H Nori Mack MD   1 Units at 03/08/25 0637    insulin glargine (LANTUS PEN) injection 10 Units  10 Units Subcutaneous At Bedtime Nori Mack MD   10 Units at 03/07/25 2328    levothyroxine (SYNTHROID/LEVOTHROID) tablet 150 mcg  150 mcg Oral Daily Nori Mack MD   150 mcg at 03/08/25 0814    lidocaine (LMX4) cream   Topical Q1H PRN Nori Mack MD        lidocaine 1 % 0.1-1 mL  0.1-1 mL Other Q1H PRN Nori Mack MD        melatonin tablet 1 mg  1 mg Oral At Bedtime PRN Nori Mack MD        mycophenolate (GENERIC EQUIVALENT) tablet 500 mg  500 mg Oral BID Nori Mack MD   500 mg at 03/08/25 0814    nitroGLYcerin (NITROSTAT) sublingual tablet 0.4 mg  0.4 mg Sublingual Q5 Min PRN Akhil Barrera MD   0.4 mg at 03/07/25 1347    nitroGLYcerin 50 mg in D5W 250 mL (adult std) infusion CENTRAL   mcg/min Intravenous Continuous Nori Mack MD 24 mL/hr at 03/08/25 0743 80 mcg/min at 03/08/25 0743    ondansetron (ZOFRAN ODT) ODT tab 4 mg  4 mg Oral Q6H PRN Nori Mack MD        Or     ondansetron (ZOFRAN) injection 4 mg  4 mg Intravenous Q6H PRN Nori Mack MD        pantoprazole (PROTONIX) EC tablet 40 mg  40 mg Oral Daily SONAL Cavazos MD   40 mg at 03/08/25 0816    Patient is already receiving anticoagulation with heparin, enoxaparin (LOVENOX), warfarin (COUMADIN)  or other anticoagulant medication   Does not apply Continuous PRN Nori Mack MD        polyethylene glycol (MIRALAX) Packet 17 g  17 g Oral Daily Nori Mack MD   17 g at 03/08/25 0816    predniSONE (DELTASONE) tablet 5 mg  5 mg Oral Daily Nori Mack MD   5 mg at 03/08/25 0816    senna-docusate (SENOKOT-S/PERICOLACE) 8.6-50 MG per tablet 1 tablet  1 tablet Oral BID PRN Nori Mack MD        Or    senna-docusate (SENOKOT-S/PERICOLACE) 8.6-50 MG per tablet 2 tablet  2 tablet Oral BID PRN Nori Mack MD        sodium chloride (PF) 0.9% PF flush 3 mL  3 mL Intracatheter Q8H Nori Mack MD        sodium chloride (PF) 0.9% PF flush 3 mL  3 mL Intracatheter q1 min prn Nori Mack MD        Vitamin D3 (CHOLECALCIFEROL) tablet 50 mcg  50 mcg Oral Daily Nori Mack MD   50 mcg at 03/08/25 0815     Current Outpatient Medications   Medication Sig Dispense Refill    albuterol (PROAIR HFA/PROVENTIL HFA/VENTOLIN HFA) 108 (90 Base) MCG/ACT inhaler Inhale 2 puffs into the lungs every 6 hours as needed for shortness of breath, wheezing or cough. 18 g 11    Alcohol Swabs PADS Use to swab the area of the injection or fam as directed Per insurance coverage 200 each 1    apixaban ANTICOAGULANT (ELIQUIS) 5 MG tablet Take 1 tablet (5 mg) by mouth 2 times daily 60 tablet 1    atorvastatin (LIPITOR) 40 MG tablet Take 1 tablet (40 mg) by mouth daily. 90 tablet 2    blood glucose (NO BRAND SPECIFIED) lancets standard Use to test blood sugar 2 times daily or as directed. 200 each 3    blood glucose (NO BRAND SPECIFIED) test strip Use to test blood sugar 2 times daily as directed. 200 strip 3    blood  "glucose monitoring (NO BRAND SPECIFIED) meter device kit Use to test blood sugar 2 times daily or as directed. 1 kit 0    diltiazem ER COATED BEADS (CARDIZEM CD/CARTIA XT) 240 MG 24 hr capsule Take 1 capsule (240 mg) by mouth daily. 90 capsule 3    fluticasone (FLONASE) 50 MCG/ACT nasal spray Spray 1 spray into both nostrils daily. 16 g 5    guaiFENesin (MUCINEX) 600 MG 12 hr tablet Take 2 tablets (1,200 mg) by mouth 2 times daily. 20 tablet 1    hydrALAZINE (APRESOLINE) 25 MG tablet Take 1 tablet (25 mg) by mouth 2 times daily. 180 tablet 3    insulin glargine (LANTUS SOLOSTAR) 100 UNIT/ML pen Inject 20 Units Subcutaneous at bedtime 15 mL 1    insulin pen needle (31G X 5 MM) 31G X 5 MM miscellaneous Use one  pen needles daily or as directed. 100 each 3    levothyroxine (SYNTHROID/LEVOTHROID) 150 MCG tablet Take 1 tablet (150 mcg) by mouth daily. 90 tablet 3    metFORMIN (GLUCOPHAGE XR) 500 MG 24 hr tablet Take 2 tablets (1,000 mg) by mouth 2 times daily (with meals). 360 tablet 3    mycophenolate (GENERIC EQUIVALENT) 500 MG tablet Take 1 tablet (500 mg) by mouth 2 times daily 180 tablet 3    omeprazole (PRILOSEC) 20 MG DR capsule Take 1 capsule (20 mg) by mouth daily. 14 capsule 0    oxyCODONE (ROXICODONE) 5 MG tablet Take 1 tablet (5 mg) by mouth every 6 hours as needed for severe pain. 12 tablet 0    predniSONE (DELTASONE) 5 MG tablet Take 1 tablet (5 mg) by mouth daily 90 tablet 1    ursodiol (ACTIGALL) 250 MG tablet Take 1 tablet (250 mg) by mouth 2 times daily 180 tablet 3    Vitamin D3 (CHOLECALCIFEROL) 25 mcg (1000 units) tablet Take 2 tablets (50 mcg) by mouth daily 180 tablet 3             Physical Exam:   /86   Pulse 100   Temp 97.8  F (36.6  C) (Oral)   Resp 18   Ht 1.626 m (5' 4\")   Wt 88 kg (194 lb 1.6 oz)   SpO2 95%   BMI 33.32 kg/m    Wt:   Wt Readings from Last 2 Encounters:   03/07/25 88 kg (194 lb 1.6 oz)   03/06/25 87.3 kg (192 lb 6.4 oz)      Constitutional: cooperative, pleasant, " not dyspneic/diaphoretic, no acute distress  Eyes: Sclera anicteric/injected  Ears/nose/mouth/throat: Normal oropharynx without ulcers or exudate, mucus membranes moist, hearing intact  Neck: supple, thyroid normal size  CV: No edema  Respiratory: Unlabored breathing  Lymph: No axillary, submandibular, supraclavicular or inguinal lymphadenopathy  Abd:  Nondistended, +bs, no hepatosplenomegaly, nontender, no peritoneal signs  Skin: warm, perfused, no jaundice  Neuro: AAO x 3, No asterixis  Psych: Normal affect  MSK: No gross deformities          Past Medical History:   Reviewed and edited as appropriate  Past Medical History:   Diagnosis Date    Anemia     Biliary stricture of transplanted liver (H) 2018    BPH (benign prostatic hyperplasia)     Cancer (H)     hepatocellualr carcinoma    Cirrhosis of liver (H) 2018    Diabetes (H)     Enlarged prostate     Hypothyroidism     Impotence of organic origin 2020    Inguinal hernia     Repaired with mesh on 18    Liver lesion 2018    Liver transplanted (H) 2018     donor liver transplant    Portal vein thrombosis     on path explant    Postoperative atrial fibrillation (H) 2018    Prostate cancer (H)     TB lung, latent     Treated             Past Surgical History:   Reviewed and edited as appropriate   Past Surgical History:   Procedure Laterality Date    APPENDECTOMY      BRONCHOSCOPY (RIGID OR FLEXIBLE), DIAGNOSTIC N/A 2024    Procedure: BRONCHOSCOPY, WITH BRONCHOALVEOLAR LAVAGE;  Surgeon: Jimmy Farley MD;  Location:  GI    COLONOSCOPY          CV CORONARY ANGIOGRAM N/A 10/13/2021    Procedure: CV CORONARY ANGIOGRAM;  Surgeon: Yaya Hampton MD;  Location:  [facility]    CV CORONARY LITHOTRIPSY PCI N/A 10/13/2021    Procedure: CV Coronary Lithotripsy PCI;  Surgeon: Yaya Hampton MD;  Location:  [facility]    CV INSTANTANEOUS WAVE-FREE RATIO N/A  "10/13/2021    Procedure: Instantaneous Wave-Free Ratio;  Surgeon: Yaya Hampton MD;  Location:  [facility]    CV INTRAVASULAR ULTRASOUND N/A 10/13/2021    Procedure: Intravascular Ultrasound;  Surgeon: Yaya Hampton MD;  Location:  [facility]    CV PCI STENT DRUG ELUTING N/A 10/13/2021    Procedure: Percutaneous Coronary Intervention Stent Drug Eluting;  Surgeon: Yaya Hampton MD;  Location:  [facility]    DAVINCI PROSTATECTOMY N/A 2020    Procedure: Robot-assisted laparoscopic radical prostatectomy, Bladder biopsy;  Surgeon: Kenrick Casiano MD;  Location: [facility]    ENDOSCOPIC RETROGRADE CHOLANGIOPANCREATOGRAM N/A 2018    Procedure: ENDOSCOPIC RETROGRADE CHOLANGIOPANCREATOGRAM, with Billary Sphincterotomy and Billary Stent Placement;  Surgeon: Omero Lawler MD;  Location: [facility]    ENDOSCOPIC RETROGRADE CHOLANGIOPANCREATOGRAM  2019    Procedure: COMBINED ENDOSCOPIC RETROGRADE CHOLANGIOPANCREATOGRAPHY, Bile Duct Stent Exchange;  Surgeon: Omero Lawler MD;  Location: [facility]    ENDOSCOPIC RETROGRADE CHOLANGIOPANCREATOGRAM N/A 2019    Procedure: ENDOSCOPIC RETROGRADE CHOLANGIOPANCREATOGRAPHY, WITH BILIARY STENT REMOVAL AND STONE EXTRACTION;  Surgeon: Omero Lalwer MD;  Location: [facility]    HERNIORRHAPHY INGUINAL  2018    Procedure: Herniorrhaphy inguinal;  Surgeon: Emil Echevarria MD;  Location: [facility]    IR LIVER BIOPSY PERCUTANEOUS  2018    LAPAROTOMY EXPLORATORY      per the patient \"for infection in the LLQ\"     PICC INSERTION Right 2024    47 cm basilic    TRANSPLANT LIVER RECIPIENT  DONOR N/A 2018    Procedure: TRANSPLANT LIVER RECIPIENT  DONOR;  Surgeon: Emil Echvearria MD;  Location: [facility] OR            Social History:   Alcohol use: Not assessed  Smoking history: Not assessed  Living situation: Not assessed         " Family History:   Reviewed and edited as appropriate  Family History   Problem Relation Age of Onset    Memory loss Mother     Liver Disease Brother     Skin Cancer No family hx of     Melanoma No family hx of       No known history of colorectal cancer, liver disease, or inflammatory bowel disease.         Allergies:   Reviewed and edited as appropriate   No Known Allergies           Review of Systems:     A complete 10 point review of systems was performed and is negative except as noted in the HPI

## 2025-03-08 NOTE — PROGRESS NOTES
[facility]   Cardiology   Progress Note     ASSESSMENT/PLAN:  Loy Limon is a 69 year old male w PMHx latent TB s/p treatment, hepatocellular carcinoma s/p liver transplant in 2018, BPH, DMII, HTN, afib with hx RVR (on diltiazem and Eliquis), hypothyroidism, CKD, anemia who presented with concern for chest pain. Troponin elevated, new T wave inversions on EKG, admitted for NSTEMI management.     Interval History: No acute events overnight. Patient hemodynamically stale, on room air. This morning he developed some chest pain which resolved with an increase in nitroglycerine gtt. Discussed with Dr. Doss who plans to activa CCL today at 1400 for coronary angiogram.      Changes Today:  - Coronary angiogram today  - Aspirin 325mg x 1     # Chest pain  # NSTEMI  # Hx CAD s/p PCI to mLAD   # Hypertension   TAVO score 4 (age, CAD risk factors, known CAD, positive trop). Nadia score  128. Presenting with chest pain that started in AM of admission, improve with nitroglycerin gtt. Troponin elevated and risin --> 407 --> 2,753 --> 5,460 --> 5,3030  EKG with new T wave inversions in V5/V6 but no obvious ST elevations. Cardiac risk factors - hx CAD, DM2, HTN, age, obesity, smoking hx. Echocardiogram 3/8 with normal EF and no WMA's.   DDX: most likely ACS. CTA with no PE, no evidence of dissection.   - Continue ASA 81 mg daily; will give additional 245mg today for angiogram  - Continue heparin gtt  - Continue Nitroglycerin gtt  - Continue PTA atorvastatin 40mg   - Continue PTA Diltiazem 240mg daily  - Continue PTA Hydralazine 25mg BID   - Telemetry  - EKG with chest pain  - No need to continue trending Troponin --> Peak 5,460 (5,303)  - NPO at [place] for possible cath tomorrow vs Monday      # Afib with RVR  # Hx VT on zio patch   Previously seen by Dr. Plascencia. Had Zio patch ordered which showed 100% afib burden, as well as 6 VT runs (fastest interval lasting 5 beats with max  rate of 2018, the longest lasting 10 beats with an avg rate of 137 bpm).   - Rate Control: Continue PTA diltiazem 240mg daily  - Anticoagulation: Holding PTA Eliquis; continue heparin gtt  - Telemetry     # Post-transplant DM II  # Obesity: BMI 33.3  25 A1c was 8.4%.  He developed steroid/immunosuppressant induced diabetes and was treated with NPH insulin, now on glargine 20u qPM only (though does not take regularly) + metformin 1000mg BID. Last seen 25 by endocrine. During current admission, no apparent acidosis (pH 7.46), though sugar high at 260.    - Decrease glargine to 10u while inpt  - MDSSI  - Hypoglycemia protocol      # s/p DDLT 2018  # Biliary stricture of transplanted liver  Follows with Dr. Galvan of hepatology- last seen 25. Ursodiol was stopped at this time.   Alcoholic cirrhosis complicated by hepatocellular carcinoma, status post bridge therapy with TACE, who in 2018 received a  donor liver transplantation. No history of rejection, no evidence of recurrent liver cancer. Compliant with meds- takes MMF 500mg BID and prednisone 5mg daily- not yet switched to tacro given CKD + proteinuria hx.   - Continue MMF 500mg BID + prednisone 5mg daily   - Hepatology consulted for immunosuppression management; appreciate assistance     # CKD stage 2  # Hx Proteinuria  Per chart review, eGFR has been in 60s. Today Cr is 1.0 (around baseline), eGFR 80.  - Renal dosing for meds     # Hypothyroidism  Most recent TSH done 25, was 1.79 (wnl).   - Continue PTA levothyroxine     FEN: NPO for possible procedure  Code status: Full  Prophylaxis:  Heparin gtt  Isolation: Contact  Disposition: Pending ischemic evaluation      Patient seen and discussed with Dr. Goldberg, who agrees with above plan.    Medically Ready for Discharge: Anticipated Tomorrow        Cora MUNGUIA, CNP  [facility] Cardiology Team    Physical Exam:  Temp:  [97.4  F (36.3  C)-97.9  F (36.6  C)] 97.8  F (36.6  C)  Pulse:   [] 100  Resp:  [18-20] 18  BP: (128-211)/() 132/86  SpO2:  [92 %-100 %] 95 %    I/O:   Intake/Output Summary (Last 24 hours) at 3/9/2025 0652  Last data filed at 3/9/2025 0352  Gross per 24 hour   Intake 240 ml   Output 1475 ml   Net -1235 ml       Wt:   Wt Readings from Last 5 Encounters:   03/07/25 88 kg (194 lb 1.6 oz)   03/06/25 87.3 kg (192 lb 6.4 oz)   02/28/25 89.8 kg (198 lb)   02/18/25 89.8 kg (198 lb)   01/14/25 89.8 kg (198 lb)       General: NAD  HEENT:  PERRLA, EOMI.   Neck: JVD not elevated.   CV: RRR. No murmur appreciated. No rubs or gallops. Peripheral radial pulse intact.  Resp: No increased work of breathing or use of accessory muscles, breathing comfortably on room air.  Lung sounds clear throughout/bilaterally  Abdomen:  Normal active bowel sounds.  Abdomen is soft. No distension, non-tender to palpation.    Extremities: Warm. Capillary refill less than 3 sec. 3/4 radial pulses bilaterally.  3/4 pedal pulses bilaterally. No pre-tibial edema. No cyanosis or clubbing.  Skin:  Warm and dry. No erythema, rashes, ulceration or diaphoresis.  Neuro: Alert and oriented x3    Medications:  Current Facility-Administered Medications   Medication Dose Route Frequency Provider Last Rate Last Admin    [Held by provider] apixaban ANTICOAGULANT (ELIQUIS) tablet 5 mg  5 mg Oral BID Nori Mack MD        aspirin (ASA) chewable tablet 81 mg  81 mg Oral Daily Nori Mack MD   81 mg at 03/08/25 0816    atorvastatin (LIPITOR) tablet 40 mg  40 mg Oral Daily Nori Mack MD   40 mg at 03/08/25 0815    [Held by provider] diltiazem ER COATED BEADS (CARDIZEM CD/CARTIA XT) 24 hr capsule 240 mg  240 mg Oral Daily Cora Augustine, APRN CNP        guaiFENesin (MUCINEX) 12 hr tablet 1,200 mg  1,200 mg Oral BID Nori Mack MD   1,200 mg at 03/08/25 0812    hydrALAZINE (APRESOLINE) tablet 25 mg  25 mg Oral BID Nori Mack MD   25 mg at 03/08/25 0812    insulin aspart (NovoLOG) injection  (RAPID ACTING)  1-7 Units Subcutaneous Q4H Nori Mack MD   1 Units at 03/08/25 1238    insulin glargine (LANTUS PEN) injection 10 Units  10 Units Subcutaneous At Bedtime Nori Mack MD   10 Units at 03/07/25 2328    levothyroxine (SYNTHROID/LEVOTHROID) tablet 150 mcg  150 mcg Oral Daily Nori Mack MD   150 mcg at 03/08/25 0814    mycophenolate (GENERIC EQUIVALENT) tablet 500 mg  500 mg Oral BID Nori Mack MD   500 mg at 03/08/25 0814    pantoprazole (PROTONIX) EC tablet 40 mg  40 mg Oral Daily ChrissoSONAL argueta MD   40 mg at 03/08/25 0816    polyethylene glycol (MIRALAX) Packet 17 g  17 g Oral Daily Nori Mack MD   17 g at 03/08/25 0816    predniSONE (DELTASONE) tablet 5 mg  5 mg Oral Daily Nori Mack MD   5 mg at 03/08/25 0816    sodium chloride (PF) 0.9% PF flush 3 mL  3 mL Intracatheter Q8H Nori Mack MD        Vitamin D3 (CHOLECALCIFEROL) tablet 50 mcg  50 mcg Oral Daily Nori Mack MD   50 mcg at 03/08/25 0815     Current Facility-Administered Medications   Medication Dose Route Frequency Provider Last Rate Last Admin    heparin 25,000 units in 0.45% NaCl 250 mL ANTICOAGULANT infusion  0-5,000 Units/hr Intravenous Continuous Catarino Knott MD 10.5 mL/hr at 03/08/25 0854 1,050 Units/hr at 03/08/25 0854    nitroGLYcerin 50 mg in D5W 250 mL (adult std) infusion CENTRAL   mcg/min Intravenous Continuous Nori Mack MD 24 mL/hr at 03/08/25 0743 80 mcg/min at 03/08/25 0743    Patient is already receiving anticoagulation with heparin, enoxaparin (LOVENOX), warfarin (COUMADIN)  or other anticoagulant medication   Does not apply Continuous PRN Nori Mack MD           Labs:   CMP  Recent Labs   Lab 03/08/25  1129 03/08/25  0941 03/08/25  0636 03/08/25  0521 03/07/25  2215 03/07/25  1345 03/07/25  1340   NA  --   --   --  135  --  137 137   POTASSIUM  --   --   --  4.3  --  4.6 4.6   CHLORIDE  --   --   --  101  --   --  99   CO2  --   --   --  23  --   --   25   ANIONGAP  --   --   --  11  --   --  13   * 166* 184* 191*   < > 247* 260*   BUN  --   --   --  17.1  --   --  19.8   CR  --   --   --  0.94  --   --  1.00   GFRESTIMATED  --   --   --  87  --   --  80   YELENA  --   --   --  8.5*  --   --  9.3   MAG  --   --   --   --   --   --  1.8   PROTTOTAL  --   --   --  5.3*  --   --  7.2   ALBUMIN  --   --   --  2.9*  --   --  3.9   BILITOTAL  --   --   --  0.9  --   --  0.8   ALKPHOS  --   --   --  104  --   --  150   AST  --   --   --  251*  --   --  37   ALT  --   --   --  40  --   --  23    < > = values in this interval not displayed.     CBC  Recent Labs   Lab 25  0521 25  1855 25  1345 25  1340   WBC 8.8 8.1  --  11.6*   RBC 5.06 5.12  --  5.94*   HGB 14.2 14.4 17.7 16.7   HCT 42.7 43.6 52 50.9   MCV 84 85  --  86   MCH 28.1 28.1  --  28.1   MCHC 33.3 33.0  --  32.8   RDW 13.1 13.1  --  13.0    233  --  283     INRNo lab results found in last 7 days.  Arterial Blood GasNo lab results found in last 7 days.    Diagnostics:  3/8/25 EK/13/24 Echocardiogram:  Global and regional left ventricular function is normal with an EF of 55-60%.  Right ventricular function, chamber size, wall motion, and thickness are  normal.  Severe left atrial enlargement is present.  The inferior vena cava is normal.  No pericardial effusion is present.  There is no prior study for direct comparison.     10/31/21 Coronary Angiogram:  One vessel obstructive coronary artery disease (60% mLAD stenosis hemodynamically significant when assessed by dFR 0.88)  Successful IVUS-guided PCI of mLAD with Synergy XD 3.50x38 mm SANTIAGO, post-dilated to 4.5 mm proximally.  Shockwave lithotripsy of mLAD         Recent Results (from the past 24 hours)   XR Chest Port 1 View    Narrative    EXAM: XR CHEST PORT 1 VIEW 3/7/2025 2:09 PM    INDICATION: Chest pain    COMPARISON: radiograph 2024    TECHNIQUE: Single portable AP view of the chest.    FINDINGS:     The  cardiomediastinal silhouette is stable. Perihilar and bibasilar  streaky opacities. No definite pneumothorax. Small right pleural  effusion. No left pleural effusion.. The visualized upper abdomen is  unremarkable. No acute osseous abnormality.       Impression    IMPRESSION:     1. Bilateral perihilar and and bibasilar streaky opacities,  representing pulmonary edema, atelectasis and/or atypical infiltrate  in the appropriate clinical settings.  2. Small right pleural effusion.  3. Stable prominent cardiac silhouette    I have personally reviewed the examination and initial interpretation  and I agree with the findings.    NIECY MOSELEY MD         SYSTEM ID:  [id number]   CTA Chest Abdomen Pelvis w Contrast    Narrative    EXAMINATION: CTA CHEST ABDOMEN PELVIS W CONTRAST, 3/7/2025 4:08 PM    INDICATION: Severe chest pain hypertension radiating to the back    COMPARISON STUDY: 1/20/2025 TECHNIQUE: CT scan of the chest, abdomen  and pelvis was performed on multidetector CT scanner using volumetric  acquisition technique and images were reconstructed in multiple planes  with variable thickness and reviewed on dedicated workstations.     CONTRAST: 110 mL injected IV without oral contrast    CT scan radiation dose is optimized to minimum requisite dose using  automated dose modulation techniques.    FINDINGS:    Lungs/pleura: Bilateral pleural effusions.    Mediastinum: Cardiomegaly. Dilated main pulmonary artery measuring 3.3  cm. Mildly prominent subcarinal lymph node measuring 1.1 cm on series  14 image 102. Dilated left atrium.    Liver: Status post liver transplant.    No biliary ductal dilatation    Pancreas: No mass or pancreatic ductal dilation.    Adrenal glands: No mass or nodules    Spleen: Normal.    Kidneys: No suspicious mass, obstructing calculus or hydronephrosis.    Gastrointestinal tract: Normal appendix. Normal caliber small bowel.   Diverticulosis without evidence of  diverticulitis.    Mesentery/peritoneum/retroperitoneum: No mass. No free fluid or air.    Lymph nodes: No significant lymphadenopathy.    Vasculature: Calcified and noncalcified atheromatous plaque of the  abdominal aorta and its major branches.    Pelvis: Urinary bladder is normal.    Osseous structures: No aggressive or acute osseous lesion.  Anterior  bridging osteophytes of the mid thoracic vertebrae. Mild degenerative  changes of the visualized spine.      Soft tissues: Within normal limits.      Impression    IMPRESSION:   1. No evidence of pulmonary emboli or aortic dissection.  2. Cardiomegaly with dilated left atrium.  3. Dilated main pulmonary artery indicating component of pulmonary  hypertension.  4. Small bilateral pleural effusions.  5. Diverticulosis without evidence of diverticulitis.  6. Mitral annulus calcifications, coronary artery calcifications, s/p  coronary artery stenting.     I have personally reviewed the examination and initial interpretation  and I agree with the findings.    GRISELDA CARLISLE MD         SYSTEM ID:  [id number]   Echo Complete   Result Value    LVEF  55-60%    Narrative    [id number]  [id number]  [id number]  [id number]^GILMER^RICHOMND^RAMEZ     [facility],[place]  Echocardiography Laboratory  [address] [place] [id number]     Name: DONNA BIGGS  MRN: [id number]  : 1955  Study Date: 2025 07:23 AM  Age: 69 yrs  Gender: Male  Patient Location: [facility]  Reason For Study: Chest Pain  Ordering Physician: RICHMOND SCHERER  Performed By: Yanira Bee     BSA: 1.9 m2  Height: 64 in  Weight: 194 lb  HR: 102  BP: 140/90 mmHg  ______________________________________________________________________________  Procedure  Echocardiogram with two-dimensional, color and spectral Doppler. Contrast  Optison. Poor acoustic windows. Optison (NDC #1287-0119-54) given  intravenously. Patient was given 6 ml mixture of 3 ml Optison and 6 ml saline.  3 ml  wasted.  ______________________________________________________________________________  Interpretation Summary  Global and regional left ventricular function is normal with an EF of 55-60%.  Right ventricular function, chamber size, wall motion, and thickness are  normal.  IVC diameter >2.1 cm collapsing <50% with sniff suggests a high RA pressure  estimated at 15 mmHg or greater.  No pericardial effusion is present.  ______________________________________________________________________________  Left Ventricle  Global and regional left ventricular function is normal with an EF of 55-60%.  Left ventricular size is normal. Mild concentric wall thickening consistent  with left ventricular hypertrophy is present. Diastolic function not assessed  due to atrial fibrillation. No regional wall motion abnormalities are seen.     Right Ventricle  Right ventricular function, chamber size, wall motion, and thickness are  normal.     Atria  The right atria appears normal. Severe left atrial enlargement is present.     Mitral Valve  Mild to moderate mitral annular calcification is present. Trace to mild mitral  insufficiency is present.     Aortic Valve  Aortic valve is normal in structure and function.     Tricuspid Valve  The tricuspid valve is normal. Trace tricuspid insufficiency is present. The  right ventricular systolic pressure is approximated at 24.3 mmHg plus the  right atrial pressure.     Pulmonic Valve  The pulmonic valve cannot be assessed.     Vessels  The thoracic aorta is normal. The pulmonary artery cannot be assessed. IVC  diameter >2.1 cm collapsing <50% with sniff suggests a high RA pressure  estimated at 15 mmHg or greater.     Pericardium  No pericardial effusion is present.     ______________________________________________________________________________  MMode/2D Measurements & Calculations  IVSd: 1.6 cm  LVIDd: 4.6 cm  LVIDs: 3.4 cm  LVPWd: 1.2 cm  FS: 26.0 %     LV mass(C)d: 257.6 grams  LV  mass(C)dI: 133.4 grams/m2  Ao root diam: 3.2 cm  asc Aorta Diam: 3.6 cm  LVOT diam: 2.2 cm  LVOT area: 3.7 cm2  Ao root diam index Ht(cm/m): 2.0  Ao root diam index BSA (cm/m2): 1.7  Asc Ao diam index BSA (cm/m2): 1.9  Asc Ao diam index Ht(cm/m): 2.2  LA Volume (BP): 97.1 ml  LA Volume Index (BP): 50.3 ml/m2     RWT: 0.50  TAPSE: 1.7 cm     Doppler Measurements & Calculations  TR max radha: 246.4 cm/sec  TR max P.3 mmHg     ______________________________________________________________________________  Report approved by: ANDREA Perez MD on 2025 08:46 AM             Medical Decision Making     35 MINUTES SPENT BY ME on the date of service doing chart review, history, exam, documentation & further activities per the note.        EZRA Rdz CNP on 3/9/2025 at 12:21 PM

## 2025-03-08 NOTE — PROGRESS NOTES
[facility]   Cardiology   Progress Note     ASSESSMENT/PLAN:  Loy Limon is a 69 year old male w PMHx latent TB s/p treatment, hepatocellular carcinoma s/p liver transplant in 2018, BPH, DMII, HTN, afib with hx RVR (on diltiazem and Eliquis), hypothyroidism, CKD, anemia who presented with concern for chest pain. Troponin elevated, new T wave inversions on EKG, admitted for NSTEMI management.     Interval History: No acute events overnight. Patient hemodynamically stable, on room air. Troponin T peaked at 5,460. At the time of interview he denies chest pain, shortness of breath, or palpitations. IV nitroglycerine infusing at 80/hr. Plan for possible angiogram tomorrow vs Monday. Will order NPO at [facility].      Changes Today:  - Continue heparin and Nitroglycerine gtt  - Possible angiogram tomorrow     # Chest pain  # NSTEMI  # Hx CAD s/p PCI to mLAD   # Hypertension   TAVO score 4 (age, CAD risk factors, known CAD, positive trop). Nadia score  128. Presenting with chest pain that started in AM of admission, improve with nitroglycerin gtt. Troponin elevated and risin --> 407 --> 2,753 --> 5,460 --> 5,3030  EKG with new T wave inversions in V5/V6 but no obvious ST elevations. Cardiac risk factors - hx CAD, DM2, HTN, age, obesity, smoking hx. Echocardiogram 3/8 with normal EF and no WMA's.   DDX: most likely ACS. CTA with no PE, no evidence of dissection.   - Continue ASA 81 mg daily  - Continue heparin gtt  - Continue Nitroglycerin gtt  - Continue PTA atorvastatin 40mg   - Continue PTA Diltiazem 240mg daily  - Continue PTA Hydralazine 25mg BID   - Telemetry  - EKG with chest pain  - No need to continue trending Troponin --> Peak 5,460 (5,303)  - NPO at [facility] for possible cath tomorrow vs Monday      # Afib with RVR  # Hx VT on zio patch   Previously seen by Dr. Plascencia. Had Zio patch ordered which showed 100% afib burden, as well as 6 VT runs (fastest interval lasting 5 beats  with max rate of 2018, the longest lasting 10 beats with an avg rate of 137 bpm).   - Rate Control: Continue PTA diltiazem 240mg daily  - Anticoagulation: Holding PTA Eliquis; continue heparin gtt  - Telemetry     # Post-transplant DM II  # Obesity: BMI 33.3  25 A1c was 8.4%.  He developed steroid/immunosuppressant induced diabetes and was treated with NPH insulin, now on glargine 20u qPM only (though does not take regularly) + metformin 1000mg BID. Last seen 25 by endocrine. During current admission, no apparent acidosis (pH 7.46), though sugar high at 260.    - Decrease glargine to 10u while inpt  - MDSSI  - hypoglycemia protocol      # s/p DDLT 2018  # Biliary stricture of transplanted liver  Follows with Dr. Galvan of hepatology- last seen 25. Ursodiol was stopped at this time.   Alcoholic cirrhosis complicated by hepatocellular carcinoma, status post bridge therapy with TACE, who in 2018 received a  donor liver transplantation. No history of rejection, no evidence of recurrent liver cancer. Compliant with meds- takes MMF 500mg BID and prednisone 5mg daily- not yet switched to tacro given CKD + proteinuria hx.   - Continue MMF 500mg BID + prednisone 5mg daily   - Hepatology consulted for immunosuppression management; appreciate assistance     # CKD stage 2  # Hx Proteinuria  Per chart review, eGFR has been in 60s. Today Cr is 1.0 (around baseline), eGFR 80.  - Renal dosing for meds     # Hypothyroidism  Most recent TSH done 25, was 1.79 (wnl).   - Continue PTA levothyroxine    FEN: NPO for possible procedure  Code status: Full  Prophylaxis:  Heparin gtt  Isolation: Contact  Disposition: Pending ischemic evaluation     Patient seen and discussed with Dr. Cavazos, who agrees with above plan.    Medically Ready for Discharge: Anticipated in 2-4 Days        Cora MUNGUIA, CNP  [facility] Cardiology Team    Physical Exam:  Temp:  [97.4  F (36.3  C)-97.9  F (36.6  C)] 97.8  F (36.6   C)  Pulse:  [] 101  Resp:  [18-20] 18  BP: (137-211)/() 142/93  SpO2:  [92 %-100 %] 94 %    I/O:   Intake/Output Summary (Last 24 hours) at 3/8/2025 0759  Last data filed at 3/8/2025 0200  Gross per 24 hour   Intake --   Output 400 ml   Net -400 ml       Wt:   Wt Readings from Last 5 Encounters:   03/07/25 88 kg (194 lb 1.6 oz)   03/06/25 87.3 kg (192 lb 6.4 oz)   02/28/25 89.8 kg (198 lb)   02/18/25 89.8 kg (198 lb)   01/14/25 89.8 kg (198 lb)       General: NAD  HEENT:  PERRLA, EOMI.   Neck: JVD not elevated.   CV: RRR. No murmur appreciated. No rubs or gallops. Peripheral radial pulse intact.  Resp: No increased work of breathing or use of accessory muscles, breathing comfortably on room air.  Lung sounds clear throughout/bilaterally  Abdomen:  Normal active bowel sounds.  Abdomen is soft. No distension, non-tender to palpation.    Extremities: Warm. Capillary refill less than 3 sec. 3/4 radial pulses bilaterally.  3/4 pedal pulses bilaterally. No pre-tibial edema. No cyanosis or clubbing.  Skin:  Warm and dry. No erythema, rashes, ulceration or diaphoresis.  Neuro: Alert and oriented x3.      Medications:  Current Facility-Administered Medications   Medication Dose Route Frequency Provider Last Rate Last Admin    [Held by provider] apixaban ANTICOAGULANT (ELIQUIS) tablet 5 mg  5 mg Oral BID Nori Mack MD        aspirin (ASA) chewable tablet 81 mg  81 mg Oral Daily Nori Mack MD        atorvastatin (LIPITOR) tablet 40 mg  40 mg Oral Daily Nori Mack MD        [Held by provider] diltiazem ER COATED BEADS (CARDIZEM CD/CARTIA XT) 24 hr capsule 240 mg  240 mg Oral Daily Nori Mack MD        guaiFENesin (MUCINEX) 12 hr tablet 1,200 mg  1,200 mg Oral BID Nori Mack MD        hydrALAZINE (APRESOLINE) tablet 25 mg  25 mg Oral BID Nori Mack MD   25 mg at 03/08/25 0245    insulin aspart (NovoLOG) injection (RAPID ACTING)  1-7 Units Subcutaneous Q4H Nori Mack MD   1  Units at 03/08/25 0637    insulin glargine (LANTUS PEN) injection 10 Units  10 Units Subcutaneous At Bedtime Nori Mack MD   10 Units at 03/07/25 2328    levothyroxine (SYNTHROID/LEVOTHROID) tablet 150 mcg  150 mcg Oral Daily Nori Mack MD        mycophenolate (GENERIC EQUIVALENT) tablet 500 mg  500 mg Oral BID Nori Mack MD   500 mg at 03/07/25 2223    pantoprazole (PROTONIX) EC tablet 40 mg  40 mg Oral Daily SONAL Cavazos MD        polyethylene glycol (MIRALAX) Packet 17 g  17 g Oral Daily Nori Mack MD        predniSONE (DELTASONE) tablet 5 mg  5 mg Oral Daily Nori Mack MD        sodium chloride (PF) 0.9% PF flush 3 mL  3 mL Intracatheter Q8H Nori Mack MD        Vitamin D3 (CHOLECALCIFEROL) tablet 50 mcg  50 mcg Oral Daily Nori Mack MD         Current Facility-Administered Medications   Medication Dose Route Frequency Provider Last Rate Last Admin    heparin 25,000 units in 0.45% NaCl 250 mL ANTICOAGULANT infusion  0-5,000 Units/hr Intravenous Continuous Catarino Knott MD 10.5 mL/hr at 03/08/25 0640 1,050 Units/hr at 03/08/25 0640    nitroGLYcerin 50 mg in D5W 250 mL (adult std) infusion CENTRAL   mcg/min Intravenous Continuous Nori Mack MD 24 mL/hr at 03/08/25 0743 80 mcg/min at 03/08/25 0743    Patient is already receiving anticoagulation with heparin, enoxaparin (LOVENOX), warfarin (COUMADIN)  or other anticoagulant medication   Does not apply Continuous PRN Nori Mack MD           [facility]:   [facility]  [facility] Labs   Lab 03/08/25  0636 03/08/25  0521 03/08/25  0240 03/07/25  2325 03/07/25  2215 03/07/25  1345 03/07/25  1340   NA  --  135  --   --   --  137 137   POTASSIUM  --  4.3  --   --   --  4.6 4.6   CHLORIDE  --  101  --   --   --   --  99   CO2  --  23  --   --   --   --  25   ANIONGAP  --  11  --   --   --   --  13   * 191* 170* 157*   < > 247* 260*   BUN  --  17.1  --   --   --   --  19.8   CR  --  0.94  --   --   --   --  1.00    GFRESTIMATED  --  87  --   --   --   --  80   YELENA  --  8.5*  --   --   --   --  9.3   MAG  --   --   --   --   --   --  1.8   PROTTOTAL  --  5.3*  --   --   --   --  7.2   ALBUMIN  --  2.9*  --   --   --   --  3.9   BILITOTAL  --  0.9  --   --   --   --  0.8   ALKPHOS  --  104  --   --   --   --  150   AST  --  251*  --   --   --   --  37   ALT  --  40  --   --   --   --  23    < > = values in this interval not displayed.     CBC  Recent Labs   Lab 25  0521 25  1855 25  1345 25  1340   WBC 8.8 8.1  --  11.6*   RBC 5.06 5.12  --  5.94*   HGB 14.2 14.4 17.7 16.7   HCT 42.7 43.6 52 50.9   MCV 84 85  --  86   MCH 28.1 28.1  --  28.1   MCHC 33.3 33.0  --  32.8   RDW 13.1 13.1  --  13.0    233  --  283     INRNo lab results found in last 7 days.  Arterial Blood GasNo lab results found in last 7 days.    Diagnostics:  3/8/25 EK/13/24 Echocardiogram:  Global and regional left ventricular function is normal with an EF of 55-60%.  Right ventricular function, chamber size, wall motion, and thickness are  normal.  Severe left atrial enlargement is present.  The inferior vena cava is normal.  No pericardial effusion is present.  There is no prior study for direct comparison.    10/31/21 Coronary Angiogram:  One vessel obstructive coronary artery disease (60% mLAD stenosis hemodynamically significant when assessed by dFR 0.88)  Successful IVUS-guided PCI of mLAD with Synergy XD 3.50x38 mm SANTIAGO, post-dilated to 4.5 mm proximally.  Shockwave lithotripsy of mLAD        Recent Results (from the past 24 hours)   XR Chest Port 1 View    Narrative    EXAM: XR CHEST PORT 1 VIEW 3/7/2025 2:09 PM    INDICATION: Chest pain    COMPARISON: radiograph 2024    TECHNIQUE: Single portable AP view of the chest.    FINDINGS:     The cardiomediastinal silhouette is stable. Perihilar and bibasilar  streaky opacities. No definite pneumothorax. Small right pleural  effusion. No left pleural effusion.. The  visualized upper abdomen is  unremarkable. No acute osseous abnormality.       Impression    IMPRESSION:     1. Bilateral perihilar and and bibasilar streaky opacities,  representing pulmonary edema, atelectasis and/or atypical infiltrate  in the appropriate clinical settings.  2. Small right pleural effusion.  3. Stable prominent cardiac silhouette    I have personally reviewed the examination and initial interpretation  and I agree with the findings.    NIECY MOSELEY MD         SYSTEM ID:  [id number]   CTA Chest Abdomen Pelvis w Contrast    Narrative    EXAMINATION: CTA CHEST ABDOMEN PELVIS W CONTRAST, 3/7/2025 4:08 PM    INDICATION: Severe chest pain hypertension radiating to the back    COMPARISON STUDY: 1/20/2025 TECHNIQUE: CT scan of the chest, abdomen  and pelvis was performed on multidetector CT scanner using volumetric  acquisition technique and images were reconstructed in multiple planes  with variable thickness and reviewed on dedicated workstations.     CONTRAST: 110 mL injected IV without oral contrast    CT scan radiation dose is optimized to minimum requisite dose using  automated dose modulation techniques.    FINDINGS:    Lungs/pleura: Bilateral pleural effusions.    Mediastinum: Cardiomegaly. Dilated main pulmonary artery measuring 3.3  cm. Mildly prominent subcarinal lymph node measuring 1.1 cm on series  14 image 102. Dilated left atrium.    Liver: Status post liver transplant.    No biliary ductal dilatation    Pancreas: No mass or pancreatic ductal dilation.    Adrenal glands: No mass or nodules    Spleen: Normal.    Kidneys: No suspicious mass, obstructing calculus or hydronephrosis.    Gastrointestinal tract: Normal appendix. Normal caliber small bowel.   Diverticulosis without evidence of diverticulitis.    Mesentery/peritoneum/retroperitoneum: No mass. No free fluid or air.    Lymph nodes: No significant lymphadenopathy.    Vasculature: Calcified and noncalcified atheromatous plaque of  the  abdominal aorta and its major branches.    Pelvis: Urinary bladder is normal.    Osseous structures: No aggressive or acute osseous lesion.  Anterior  bridging osteophytes of the mid thoracic vertebrae. Mild degenerative  changes of the visualized spine.      Soft tissues: Within normal limits.      Impression    IMPRESSION:   1. No evidence of pulmonary emboli or aortic dissection.  2. Cardiomegaly with dilated left atrium.  3. Dilated main pulmonary artery indicating component of pulmonary  hypertension.  4. Small bilateral pleural effusions.  5. Diverticulosis without evidence of diverticulitis.  6. Mitral annulus calcifications, coronary artery calcifications, s/p  coronary artery stenting.     I have personally reviewed the examination and initial interpretation  and I agree with the findings.    GRISELDA CARLISLE MD         SYSTEM ID:  [id number]       Medical Decision Making       30 MINUTES SPENT BY ME on the date of service doing chart review, history, exam, documentation & further activities per the note.        EZRA Rdz CNP on 3/8/2025 at 11:03 AM

## 2025-03-08 NOTE — H&P
Cardiology Inpatient H&P  2025          ASSESSMENT/PLAN   Loy Limon is a 69 year old male w PMHx latent TB s/p treatment, hepatocellular carcinoma s/p liver transplant in 2018, BPH, DMII, HTN, afib with hx RVR (on diltiazem and Eliquis), hypothyroidism, CKD, anemia who presented with concern for chest pain. Troponin elevated, new T wave inversions on EKG, admitted for NSTEMI management.     #Chest pain  #NSTEMI  #Elevated troponin  #Hx CAD s/p PCI to AD   TAVO score 4 (age, CAD risk factors, known CAD, positive trop). Nadia score  128.  Presenting with chest pain that started in AM of admission, improve with nitroglycerin gtt. Troponin elevated and risin --> 407, EKG with new T wave inversions in V5/V6 but no obvious ST elevations. Cardiac risk factors - hx CAD, DM2, HTN, age, obesity, smoking hx.   DDX: most likely ACS. CTA with no PE, no evidence of dissection.   - S/p Aspirin 324mg in ED, continue ASA 81 mg daily  - Heparin gtt  - Nitroglycerin gtt, titrate for chest pain (previously titrating for goal SBP <140)  - atorvastatin 40mg   - holding PTA diltiazem, hydralazine   - cardiac monitoring, oxymetry  - Repeat trop + EKG q3 hrs  - TTE in AM  - NPO at MN for possible cath    #Afib with RVR  #Hx VT on zio patch   Previously seen by Dr. Plascencia. Had Zio patch ordered which showed 100% afib burden, as well as 6 VT runs (fastest interval lasting 5 beats with max rate of 2018, the longest lasting 10 beats with an avg rate of 137 bpm).   - holding PTA diltiazem given potential procedure, c/f decompensation  - cardiac monitor  - holding PTA apixaban while on heparin gtt    #HTN  BP high on admission, 211/105 (per pt systolics are usually 140-160 at home). Reports compliance with home meds (hydralazine, diltiazem). No headache, Cr wnl at 1.0  - restart PTA hydral   - holding PTA diltiazem  - RN-driven lytes repletion: K >4, Mg >2    #Post-transplant DM II  #Obesity: BMI 33.3  25  A1c was 8.4%.  He developed steroid/immunosuppressant induced diabetes and was treated with NPH insulin, now on glargine 20u qPM only (though does not take regularly) + metformin 1000mg BID. Last seen 25 by endocrine. During current admission, no apparent acidosis (pH 7.46), though sugar high at 260.    - Decrease glargine to 10u while inpt  - MDSSI  - hypoglycemia protocol     #S/p DDLT 2018  #Biliary stricture of transplanted liver  Follows with Dr. Galvan of hepatology- last seen 25. Ursodiol was stopped at this time.   Alcoholic cirrhosis complicated by hepatocellular carcinoma, status post bridge therapy with TACE, who in 2018 received a  donor liver transplantation. No history of rejection, no evidence of recurrent liver cancer. Compliant with meds- takes MMF 500mg BID and prednisone 5mg daily- not yet switched to tacro given CKD + proteinuria hx.   - continue MMF 500mg BID + prednisone 5mg daily   - hepatology consulted for immunosuppression management.     #CKD stage 2  #Hx proteinuria  Per chart review, eGFR has been in 60s. Today Cr is 1.0 (around baseline), eGFR 80.  - Renal dosing for meds    #Hypothyroidism  Most recent TSH done 25, was 1.79 (wnl).   - PTA levothyroxine      FEN: NPO in case of cardiac procedure   PPx: heparin gtt for thrombus risk   Code: DNR/DNI: long discussion with patient re code status; he expressed his wishes that he would never want to be intubated and unable to speak to his family/care team. Discussed that he would be ok with no CPR - specifically stated that he did not want extreme measures.     Clinically Significant Risk Factors Present on Admission                # Drug Induced Coagulation Defect: home medication list includes an anticoagulant medication    # Hypertension: Noted on problem list          # DMII: A1C = 8.4 % (Ref range: <=5.7 %) within past 6 months    # Obesity: Estimated body mass index is 33.32 kg/m  as calculated from the  "following:    Height as of this encounter: 1.626 m (5' 4\").    Weight as of this encounter: 88 kg (194 lb 1.6 oz).             To be formally staffed in AM.     Nori Mack MD  PGY2 Internal Medicine             HISTORY OF PRESENT ILLNESS   Loy Limon is a 69 year old male w PMHx latent TB s/p treatment, hepatocellular carcinoma s/p liver transplant in 2018, BPH, DMII, HTN, afib with hx RVR (on diltiazem and Eliquis), hypothyroidism, CKD, anemia who is here with chest pain.     Pt Ukrainian-speaking- Ipad  used. Wife at bedside.     Reports that chest pain started around 9am/10am morning of admission. Pain was located in center of chest and described as \"crushing\". Had pain with breathing- \"pinching lungs\" but felt that he could otherwise take a deep breath without pain and didn't have difficulty breathing. Reported a lot of diaphoresis as well. No radiation to jaw/arms. He was able to take his morning medications. Did not take any meds prior to arrival.     This type of pain has never happened before. No known history of heart disease in his family. He used to be a smoker but quit 7 years ago (around time of undergoing liver transplant). No alcohol use.     On exam, chest pain much improved with nitroglycerin gtt. No headache, no shortness of breath laying flat.     ED course:  - Vitals on admission: afebrile, HR 120s, /105, SpO2 95% on room air.   - BMP overall wnl: Na 137, K 4.6, Cl 99, bicarb 25, Cr 1.0 (around baseline), eGFR 80, Mag 1.8  - Hepatic panel wnl: ALT 23, AST 37, bili 0.8  - CBC: WBC higher at 11.6, Hgb 16.7, plt 283  - Trop: 70--> 407  - EKG: afib w RVR, no obvious ST elevations, new T wave inversions in V5,V6, Qtc 492  - CXR: Bilateral perihilar and and bibasilar streaky opacities- pulmonary edema vs infiltrate, small R pleural effusion  - CTA CAP: no pulmonary emboli, no aortic dissection. Cardiomegaly with dilated left atrium. Dilated main pulmonary artery indicating " component of pulmonary hypertension. Diverticulosis without evidence of diverticulitis.   - Received 500cc NS  - Received ASA 324mg, oxycodone 5mg for pain, heparin gtt, started on nitroglycerin gtt for BP control     Review of Systems:    Complete review of systems was performed and negative except per HPI.                 CARDIAC HISTORY AND IMAGING     EKG:  EKG 3/7/2025 1300: afib w RVR, no obvious ST elevations, new T wave inversions in V5,V6,  Qtc 492    Transthoracic echocardiogram:   ECHO 3/3/24  Interpretation:   Left ventricular function is normal.The ejection fraction is 60-65%.  Global right ventricular function is normal.  The inferior vena cava is normal.  This study was compared with the study from 2/9/24. No change in ventricular  function.     ECHO with myocardial strain 4/13/24  Interpretation:  Global and regional left ventricular function is normal with an EF of 55-60%.  Right ventricular function, chamber size, wall motion, and thickness are  normal.  Severe left atrial enlargement is present.  The inferior vena cava is normal.  No pericardial effusion is present.  There is no prior study for direct comparison.  _____________________________________    Stress Testing:  Stress ECHO 7/21:  Interpretation Summary  Submaximal stress test, achieved 71% of the age predicted maximal heart rate.  Test stopped due to chest pain.     Hypertensive blood pressure response to exercise.  Patient 9/10 chest pain with exercise.  No ECG evidence of ischemia.  Normal segmental and global LV function with EF of approximately 60-65% at  rest; with exercise left ventricular cavity size decreases and LVEF increases  to 65-70%.  No stress induced regional wall motion abnormalities.  Less than average functional capacity for age.     Normal aortic root and no significant valvular dysfunction noted on screening  2D and Doppler examination. Recommend follow up with PCP and cardiology.    Coronary Angiogram:  Cardiac Cath  10/13/2021:  One vessel obstructive coronary artery disease (60% mLAD stenosis hemodynamically significant when assessed by dFR 0.88)  Successful IVUS-guided PCI of mLAD with Synergy XD 3.50x38 mm SANTIAGO, post-dilated to 4.5 mm proximally.  Shockwave lithotripsy of mLAD.    Right Heart Catheterization:  2018  Normal R sided filling pressures  Borderline L sided filling pressures  Normal PA pressures  Anomal cardiac output, 5.4L/min with index 2.8 L/min/m2    CMR:  None seen             PAST MEDICAL HISTORY     Past Medical History:   Diagnosis Date    Anemia     Biliary stricture of transplanted liver (H) 2018    BPH (benign prostatic hyperplasia)     Cancer (H)     hepatocellualr carcinoma    Cirrhosis of liver (H) 2018    Diabetes (H)     Enlarged prostate     Hypothyroidism     Impotence of organic origin 2020    Inguinal hernia     Repaired with mesh on 18    Liver lesion 2018    Liver transplanted (H) 2018     donor liver transplant    Portal vein thrombosis     on path explant    Postoperative atrial fibrillation (H) 2018    Prostate cancer (H)     TB lung, latent     Treated      Active Problems:  Patient Active Problem List    Diagnosis Date Noted    NSTEMI (non-ST elevated myocardial infarction) (H) 2025     Priority: Medium    Lung replaced by transplant (H) 2025     Priority: Medium    Memory loss 04/15/2024     Priority: Medium    Confusion 2024     Priority: Medium    Atrial fibrillation with rapid ventricular response (H) 2024     Priority: Medium    Sepsis, due to unspecified organism, unspecified whether acute organ dysfunction present (H) 2024     Priority: Medium    Acute UTI 2024     Priority: Medium    DEREK (acute kidney injury) 2024     Priority: Medium    Fever in adult 2024     Priority: Medium    Community acquired pneumonia of left lower lobe of lung 2024     Priority: Medium    Coronary  artery disease of native artery of native heart with stable angina pectoris 04/04/2023     Priority: Medium    Polyneuropathy associated with underlying disease 10/21/2021     Priority: Medium    Uncontrolled type 2 diabetes mellitus with hyperglycemia (H) 10/21/2021     Priority: Medium    Status post coronary angiogram 10/13/2021     Priority: Medium    Hypothyroidism, unspecified type 07/29/2021     Priority: Medium    Chronic kidney disease, stage 2 (mild) 07/23/2021     Priority: Medium    Persistent proteinuria 04/16/2021     Priority: Medium    Diabetes mellitus, type 2 (H) 11/18/2020     Priority: Medium    Prostate cancer (H) 10/24/2019     Priority: Medium     Added automatically from request for surgery [id number]      Laennec's cirrhosis (H) 06/07/2019     Priority: Medium    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/2019     Priority: Medium    Drug-induced diabetes mellitus 01/07/2019     Priority: Medium     Steroid induced      Hyperglycemia 01/03/2019     Priority: Medium    Pre-diabetes 01/03/2019     Priority: Medium    Essential hypertension 01/03/2019     Priority: Medium    Constipation 01/03/2019     Priority: Medium    Biliary stricture of transplanted liver (H) 01/03/2019     Priority: Medium    Immunosuppressed status 12/27/2018     Priority: Medium    Hypervolemia 12/27/2018     Priority: Medium    Hematuria 12/27/2018     Priority: Medium     After cota placement      Bilateral wheezing 12/27/2018     Priority: Medium    Hypoxia 12/27/2018     Priority: Medium    Atrial fibrillation with rapid ventricular response (H) 12/25/2018     Priority: Medium    Liver transplant recipient (H) 12/22/2018     Priority: Medium    Cirrhosis of liver (H) 05/08/2018     Priority: Medium    Liver lesion 05/08/2018     Priority: Medium    Elevated ferritin 03/14/2018     Priority: Medium    Inguinal hernia of right side with obstruction and without gangrene 03/14/2018     Priority: Medium    Right sided abdominal pain  2018     Priority: Medium    Tobacco use disorder 2008     Priority: Medium     Overview:   Tobacco Abuse       Social History:  Social History     Tobacco Use    Smoking status: Former     Current packs/day: 0.00     Average packs/day: (1.4 ttl pk-yrs)     Types: Cigarettes, Cigars     Start date: 1970     Quit date: 3/14/2018     Years since quittin.9    Smokeless tobacco: Never    Tobacco comments:     Quit smoking 2018, one cigarette after dinner   Vaping Use    Vaping status: Never Used   Substance Use Topics    Alcohol use: No     Comment: Quit drinking 2018    Drug use: No     Family History:  Family History   Problem Relation Age of Onset    Memory loss Mother     Liver Disease Brother     Skin Cancer No family hx of     Melanoma No family hx of        Medications:  Current Facility-Administered Medications   Medication Dose Route Frequency Provider Last Rate Last Admin       Current Facility-Administered Medications   Medication Dose Route Frequency Provider Last Rate Last Admin    heparin 25,000 units in 0.45% NaCl 250 mL ANTICOAGULANT infusion  0-5,000 Units/hr Intravenous Continuous Catarino Knott MD 10.5 mL/hr at 25 1850 1,050 Units/hr at 25 185    nitroGLYcerin 50 mg in D5W 250 mL (adult std) infusion CENTRAL   mcg/min Intravenous Continuous Catarino Knott MD 3 mL/hr at 251 10 mcg/min at 25 185                PHYSICAL EXAM     Temp:  [97.4  F (36.3  C)] 97.4  F (36.3  C)  Pulse:  [] 100  Resp:  [20] 20  BP: (155-211)/() 169/111  SpO2:  [92 %-95 %] 95 %  No intake or output data in the 24 hours ending 25    GEN: pleasant, no acute distress, laying in bed  HEENT: No discharge  EYES: no icterus  CV: RRR, normal s1/s2, no murmurs/rubs/s3/s4, no heave. JVP at mid-neck  CHEST: clear to ausculation bilaterally, no rales or wheezing  ABD: soft, non-tender, normal active bowel sounds  : no flank/suprapubic  tenderness  EXTR: No clubbing, cyanosis or edema.   NEURO: alert oriented, speech fluent/appropriate, motor grossly nonfocal  PSYCH: cooperative, affect appropriate, pleasant          DIAGNOSTICS     All labs and imaging were reviewed, of note:    CMP  Recent Labs   Lab 03/07/25  1345 03/07/25  1340    137   POTASSIUM 4.6 4.6   CHLORIDE  --  99   CO2  --  25   ANIONGAP  --  13   * 260*   BUN  --  19.8   CR  --  1.00   GFRESTIMATED  --  80   YELENA  --  9.3   MAG  --  1.8   PROTTOTAL  --  7.2   ALBUMIN  --  3.9   BILITOTAL  --  0.8   ALKPHOS  --  150   AST  --  37   ALT  --  23     CBC  Recent Labs   Lab 03/07/25  1345 03/07/25  1340   WBC  --  11.6*   RBC  --  5.94*   HGB 17.7 16.7   HCT 52 50.9   MCV  --  86   MCH  --  28.1   MCHC  --  32.8   RDW  --  13.0   PLT  --  283     INRNo lab results found in last 7 days.  Arterial Blood GasNo lab results found in last 7 days.    Lab Results   Component Value Date    TROPI 0.074 (H) 12/25/2018    TROPI 0.113 (H) 12/25/2018

## 2025-03-09 ENCOUNTER — APPOINTMENT (OUTPATIENT)
Dept: OCCUPATIONAL THERAPY | Facility: CLINIC | Age: 70
DRG: 280 | End: 2025-03-09
Payer: COMMERCIAL

## 2025-03-09 LAB
ALBUMIN SERPL BCG-MCNC: 2.8 G/DL (ref 3.5–5.2)
ALP SERPL-CCNC: 102 U/L (ref 40–150)
ALT SERPL W P-5'-P-CCNC: 27 U/L (ref 0–70)
ANION GAP SERPL CALCULATED.3IONS-SCNC: 7 MMOL/L (ref 7–15)
APTT PPP: 68 SECONDS (ref 22–38)
AST SERPL W P-5'-P-CCNC: 94 U/L (ref 0–45)
ATRIAL RATE - MUSE: NORMAL BPM
BILIRUB SERPL-MCNC: 0.7 MG/DL
BUN SERPL-MCNC: 16.8 MG/DL (ref 8–23)
CALCIUM SERPL-MCNC: 8.3 MG/DL (ref 8.8–10.4)
CHLORIDE SERPL-SCNC: 105 MMOL/L (ref 98–107)
CREAT SERPL-MCNC: 1.1 MG/DL (ref 0.67–1.17)
DIASTOLIC BLOOD PRESSURE - MUSE: NORMAL MMHG
EGFRCR SERPLBLD CKD-EPI 2021: 72 ML/MIN/1.73M2
ERYTHROCYTE [DISTWIDTH] IN BLOOD BY AUTOMATED COUNT: 13 % (ref 10–15)
GLUCOSE BLDC GLUCOMTR-MCNC: 104 MG/DL (ref 70–99)
GLUCOSE BLDC GLUCOMTR-MCNC: 118 MG/DL (ref 70–99)
GLUCOSE BLDC GLUCOMTR-MCNC: 139 MG/DL (ref 70–99)
GLUCOSE BLDC GLUCOMTR-MCNC: 156 MG/DL (ref 70–99)
GLUCOSE BLDC GLUCOMTR-MCNC: 201 MG/DL (ref 70–99)
GLUCOSE BLDC GLUCOMTR-MCNC: 233 MG/DL (ref 70–99)
GLUCOSE SERPL-MCNC: 110 MG/DL (ref 70–99)
HCO3 SERPL-SCNC: 24 MMOL/L (ref 22–29)
HCT VFR BLD AUTO: 39.6 % (ref 40–53)
HGB BLD-MCNC: 12.9 G/DL (ref 13.3–17.7)
INTERPRETATION ECG - MUSE: NORMAL
MAGNESIUM SERPL-MCNC: 2 MG/DL (ref 1.7–2.3)
MCH RBC QN AUTO: 27.7 PG (ref 26.5–33)
MCHC RBC AUTO-ENTMCNC: 32.6 G/DL (ref 31.5–36.5)
MCV RBC AUTO: 85 FL (ref 78–100)
P AXIS - MUSE: NORMAL DEGREES
PLATELET # BLD AUTO: 199 10E3/UL (ref 150–450)
POTASSIUM SERPL-SCNC: 3.9 MMOL/L (ref 3.4–5.3)
PR INTERVAL - MUSE: NORMAL MS
PROT SERPL-MCNC: 5.3 G/DL (ref 6.4–8.3)
QRS DURATION - MUSE: 90 MS
QT - MUSE: 364 MS
QTC - MUSE: 422 MS
R AXIS - MUSE: -25 DEGREES
RBC # BLD AUTO: 4.66 10E6/UL (ref 4.4–5.9)
SODIUM SERPL-SCNC: 136 MMOL/L (ref 135–145)
SYSTOLIC BLOOD PRESSURE - MUSE: NORMAL MMHG
T AXIS - MUSE: 129 DEGREES
VENTRICULAR RATE- MUSE: 81 BPM
WBC # BLD AUTO: 7 10E3/UL (ref 4–11)

## 2025-03-09 PROCEDURE — 99152 MOD SED SAME PHYS/QHP 5/>YRS: CPT | Mod: GC | Performed by: INTERNAL MEDICINE

## 2025-03-09 PROCEDURE — B2111ZZ FLUOROSCOPY OF MULTIPLE CORONARY ARTERIES USING LOW OSMOLAR CONTRAST: ICD-10-PCS | Performed by: INTERNAL MEDICINE

## 2025-03-09 PROCEDURE — C1887 CATHETER, GUIDING: HCPCS | Performed by: INTERNAL MEDICINE

## 2025-03-09 PROCEDURE — C1894 INTRO/SHEATH, NON-LASER: HCPCS | Performed by: INTERNAL MEDICINE

## 2025-03-09 PROCEDURE — 82435 ASSAY OF BLOOD CHLORIDE: CPT

## 2025-03-09 PROCEDURE — 84155 ASSAY OF PROTEIN SERUM: CPT

## 2025-03-09 PROCEDURE — 250N000011 HC RX IP 250 OP 636: Performed by: INTERNAL MEDICINE

## 2025-03-09 PROCEDURE — 85018 HEMOGLOBIN: CPT

## 2025-03-09 PROCEDURE — 250N000009 HC RX 250: Performed by: INTERNAL MEDICINE

## 2025-03-09 PROCEDURE — 93005 ELECTROCARDIOGRAM TRACING: CPT

## 2025-03-09 PROCEDURE — 250N000013 HC RX MED GY IP 250 OP 250 PS 637: Performed by: INTERNAL MEDICINE

## 2025-03-09 PROCEDURE — 272N000001 HC OR GENERAL SUPPLY STERILE: Performed by: INTERNAL MEDICINE

## 2025-03-09 PROCEDURE — 97530 THERAPEUTIC ACTIVITIES: CPT | Mod: GO

## 2025-03-09 PROCEDURE — 36415 COLL VENOUS BLD VENIPUNCTURE: CPT

## 2025-03-09 PROCEDURE — 97165 OT EVAL LOW COMPLEX 30 MIN: CPT | Mod: GO

## 2025-03-09 PROCEDURE — 93010 ELECTROCARDIOGRAM REPORT: CPT | Performed by: INTERNAL MEDICINE

## 2025-03-09 PROCEDURE — 99232 SBSQ HOSP IP/OBS MODERATE 35: CPT | Mod: 25

## 2025-03-09 PROCEDURE — 258N000003 HC RX IP 258 OP 636: Performed by: STUDENT IN AN ORGANIZED HEALTH CARE EDUCATION/TRAINING PROGRAM

## 2025-03-09 PROCEDURE — C1726 CATH, BAL DIL, NON-VASCULAR: HCPCS | Performed by: INTERNAL MEDICINE

## 2025-03-09 PROCEDURE — 84075 ASSAY ALKALINE PHOSPHATASE: CPT

## 2025-03-09 PROCEDURE — 250N000011 HC RX IP 250 OP 636

## 2025-03-09 PROCEDURE — 250N000013 HC RX MED GY IP 250 OP 250 PS 637

## 2025-03-09 PROCEDURE — 250N000012 HC RX MED GY IP 250 OP 636 PS 637

## 2025-03-09 PROCEDURE — 120N000005 HC R&B MS OVERFLOW UMMC

## 2025-03-09 PROCEDURE — 83735 ASSAY OF MAGNESIUM: CPT | Performed by: INTERNAL MEDICINE

## 2025-03-09 PROCEDURE — 85730 THROMBOPLASTIN TIME PARTIAL: CPT | Performed by: INTERNAL MEDICINE

## 2025-03-09 PROCEDURE — 93454 CORONARY ARTERY ANGIO S&I: CPT | Performed by: INTERNAL MEDICINE

## 2025-03-09 PROCEDURE — 99152 MOD SED SAME PHYS/QHP 5/>YRS: CPT

## 2025-03-09 PROCEDURE — 93454 CORONARY ARTERY ANGIO S&I: CPT | Mod: 26 | Performed by: INTERNAL MEDICINE

## 2025-03-09 PROCEDURE — 36415 COLL VENOUS BLD VENIPUNCTURE: CPT | Performed by: INTERNAL MEDICINE

## 2025-03-09 PROCEDURE — C1769 GUIDE WIRE: HCPCS | Performed by: INTERNAL MEDICINE

## 2025-03-09 RX ORDER — ASPIRIN 325 MG
325 TABLET ORAL ONCE
Status: COMPLETED | OUTPATIENT
Start: 2025-03-09 | End: 2025-03-09

## 2025-03-09 RX ORDER — NALOXONE HYDROCHLORIDE 0.4 MG/ML
0.2 INJECTION, SOLUTION INTRAMUSCULAR; INTRAVENOUS; SUBCUTANEOUS
Status: ACTIVE | OUTPATIENT
Start: 2025-03-09 | End: 2025-03-09

## 2025-03-09 RX ORDER — FENTANYL CITRATE 50 UG/ML
INJECTION, SOLUTION INTRAMUSCULAR; INTRAVENOUS
Status: DISCONTINUED | OUTPATIENT
Start: 2025-03-09 | End: 2025-03-09 | Stop reason: HOSPADM

## 2025-03-09 RX ORDER — MAGNESIUM OXIDE 400 MG/1
400 TABLET ORAL EVERY 4 HOURS
Status: COMPLETED | OUTPATIENT
Start: 2025-03-09 | End: 2025-03-09

## 2025-03-09 RX ORDER — IOPAMIDOL 755 MG/ML
INJECTION, SOLUTION INTRAVASCULAR
Status: DISCONTINUED | OUTPATIENT
Start: 2025-03-09 | End: 2025-03-09 | Stop reason: HOSPADM

## 2025-03-09 RX ORDER — NALOXONE HYDROCHLORIDE 0.4 MG/ML
0.4 INJECTION, SOLUTION INTRAMUSCULAR; INTRAVENOUS; SUBCUTANEOUS
Status: ACTIVE | OUTPATIENT
Start: 2025-03-09 | End: 2025-03-09

## 2025-03-09 RX ORDER — HEPARIN SODIUM 1000 [USP'U]/ML
INJECTION, SOLUTION INTRAVENOUS; SUBCUTANEOUS
Status: DISCONTINUED | OUTPATIENT
Start: 2025-03-09 | End: 2025-03-09 | Stop reason: HOSPADM

## 2025-03-09 RX ORDER — NICARDIPINE HYDROCHLORIDE 2.5 MG/ML
INJECTION INTRAVENOUS
Status: DISCONTINUED | OUTPATIENT
Start: 2025-03-09 | End: 2025-03-09 | Stop reason: HOSPADM

## 2025-03-09 RX ORDER — FENTANYL CITRATE 50 UG/ML
25 INJECTION, SOLUTION INTRAMUSCULAR; INTRAVENOUS
Status: DISCONTINUED | OUTPATIENT
Start: 2025-03-09 | End: 2025-03-10 | Stop reason: HOSPADM

## 2025-03-09 RX ORDER — OXYCODONE HYDROCHLORIDE 5 MG/1
5 TABLET ORAL EVERY 4 HOURS PRN
Status: DISCONTINUED | OUTPATIENT
Start: 2025-03-09 | End: 2025-03-10 | Stop reason: HOSPADM

## 2025-03-09 RX ORDER — OXYCODONE HYDROCHLORIDE 10 MG/1
10 TABLET ORAL EVERY 4 HOURS PRN
Status: DISCONTINUED | OUTPATIENT
Start: 2025-03-09 | End: 2025-03-10 | Stop reason: HOSPADM

## 2025-03-09 RX ORDER — NITROGLYCERIN 5 MG/ML
VIAL (ML) INTRAVENOUS
Status: DISCONTINUED | OUTPATIENT
Start: 2025-03-09 | End: 2025-03-09 | Stop reason: HOSPADM

## 2025-03-09 RX ORDER — SODIUM CHLORIDE 9 MG/ML
75 INJECTION, SOLUTION INTRAVENOUS CONTINUOUS
Status: ACTIVE | OUTPATIENT
Start: 2025-03-09 | End: 2025-03-09

## 2025-03-09 RX ORDER — DILTIAZEM HYDROCHLORIDE 180 MG/1
180 CAPSULE, COATED, EXTENDED RELEASE ORAL DAILY
Status: DISCONTINUED | OUTPATIENT
Start: 2025-03-10 | End: 2025-03-10

## 2025-03-09 RX ORDER — ACETAMINOPHEN 325 MG/1
650 TABLET ORAL EVERY 4 HOURS PRN
Status: DISCONTINUED | OUTPATIENT
Start: 2025-03-09 | End: 2025-03-10 | Stop reason: HOSPADM

## 2025-03-09 RX ORDER — FLUMAZENIL 0.1 MG/ML
0.2 INJECTION, SOLUTION INTRAVENOUS
Status: ACTIVE | OUTPATIENT
Start: 2025-03-09 | End: 2025-03-09

## 2025-03-09 RX ORDER — ASPIRIN 81 MG/1
81 TABLET, CHEWABLE ORAL DAILY
Status: DISCONTINUED | OUTPATIENT
Start: 2025-03-10 | End: 2025-03-10 | Stop reason: HOSPADM

## 2025-03-09 RX ORDER — ATROPINE SULFATE 0.1 MG/ML
0.5 INJECTION INTRAVENOUS
Status: ACTIVE | OUTPATIENT
Start: 2025-03-09 | End: 2025-03-09

## 2025-03-09 RX ADMIN — APIXABAN 5 MG: 5 TABLET, FILM COATED ORAL at 21:01

## 2025-03-09 RX ADMIN — ATORVASTATIN CALCIUM 40 MG: 40 TABLET, FILM COATED ORAL at 08:42

## 2025-03-09 RX ADMIN — POLYETHYLENE GLYCOL 3350 17 G: 17 POWDER, FOR SOLUTION ORAL at 08:41

## 2025-03-09 RX ADMIN — HYDRALAZINE HYDROCHLORIDE 25 MG: 25 TABLET ORAL at 21:01

## 2025-03-09 RX ADMIN — LEVOTHYROXINE SODIUM 150 MCG: 0.07 TABLET ORAL at 08:41

## 2025-03-09 RX ADMIN — GUAIFENESIN 1200 MG: 600 TABLET ORAL at 08:42

## 2025-03-09 RX ADMIN — HYDRALAZINE HYDROCHLORIDE 25 MG: 25 TABLET ORAL at 08:42

## 2025-03-09 RX ADMIN — INSULIN GLARGINE 10 UNITS: 100 INJECTION, SOLUTION SUBCUTANEOUS at 22:37

## 2025-03-09 RX ADMIN — PREDNISONE 5 MG: 5 TABLET ORAL at 08:42

## 2025-03-09 RX ADMIN — MYCOPHENOLATE MOFETIL 500 MG: 500 TABLET, FILM COATED ORAL at 21:01

## 2025-03-09 RX ADMIN — GUAIFENESIN 1200 MG: 600 TABLET ORAL at 21:01

## 2025-03-09 RX ADMIN — DILTIAZEM HYDROCHLORIDE 240 MG: 240 CAPSULE, EXTENDED RELEASE ORAL at 08:42

## 2025-03-09 RX ADMIN — MAGNESIUM OXIDE TAB 400 MG (241.3 MG ELEMENTAL MG) 400 MG: 400 (241.3 MG) TAB at 11:01

## 2025-03-09 RX ADMIN — SODIUM CHLORIDE 75 ML/HR: 0.9 INJECTION, SOLUTION INTRAVENOUS at 16:04

## 2025-03-09 RX ADMIN — NITROGLYCERIN 80 MCG/MIN: 20 INJECTION INTRAVENOUS at 04:08

## 2025-03-09 RX ADMIN — Medication 50 MCG: at 08:41

## 2025-03-09 RX ADMIN — MYCOPHENOLATE MOFETIL 500 MG: 500 TABLET, FILM COATED ORAL at 08:43

## 2025-03-09 RX ADMIN — INSULIN ASPART 1 UNITS: 100 INJECTION, SOLUTION INTRAVENOUS; SUBCUTANEOUS at 13:56

## 2025-03-09 RX ADMIN — INSULIN ASPART 2 UNITS: 100 INJECTION, SOLUTION INTRAVENOUS; SUBCUTANEOUS at 22:37

## 2025-03-09 RX ADMIN — ASPIRIN 325 MG ORAL TABLET 325 MG: 325 PILL ORAL at 08:42

## 2025-03-09 RX ADMIN — INSULIN ASPART 2 UNITS: 100 INJECTION, SOLUTION INTRAVENOUS; SUBCUTANEOUS at 18:54

## 2025-03-09 RX ADMIN — PANTOPRAZOLE SODIUM 40 MG: 40 TABLET, DELAYED RELEASE ORAL at 08:42

## 2025-03-09 RX ADMIN — MAGNESIUM OXIDE TAB 400 MG (241.3 MG ELEMENTAL MG) 400 MG: 400 (241.3 MG) TAB at 08:42

## 2025-03-09 ASSESSMENT — ACTIVITIES OF DAILY LIVING (ADL)
ADLS_ACUITY_SCORE: 44
ADLS_ACUITY_SCORE: 41
ADLS_ACUITY_SCORE: 41
ADLS_ACUITY_SCORE: 42
ADLS_ACUITY_SCORE: 42
ADLS_ACUITY_SCORE: 41
ADLS_ACUITY_SCORE: 45
ADLS_ACUITY_SCORE: 44
ADLS_ACUITY_SCORE: 45
ADLS_ACUITY_SCORE: 38
ADLS_ACUITY_SCORE: 38
ADLS_ACUITY_SCORE: 41
ADLS_ACUITY_SCORE: 44
ADLS_ACUITY_SCORE: 41
ADLS_ACUITY_SCORE: 41
ADLS_ACUITY_SCORE: 45
ADLS_ACUITY_SCORE: 41

## 2025-03-09 NOTE — PROGRESS NOTES
03/09/25 1000   Appointment Info   Signing Clinician's Name / Credentials (OT) DHARA Tesfaye   Student Supervision On-site supervision provided   Rehab Comments (OT) , monitor HR       Present yes  (Daughter in room helping with interpretation)   Language Yi   Living Environment   People in Home significant other   Current Living Arrangements apartment   Home Accessibility stairs to enter home   Number of Stairs, Main Entrance 10   Stair Railings, Main Entrance railings on both sides of stairs   Transportation Anticipated family or friend will provide   Living Environment Comments Pt currently lives in single level apartment on second floor with 8-10 stairs to navigate. Pt reports having a tub shower, and no AD equipment.   Self-Care   Usual Activity Tolerance good   Current Activity Tolerance moderate   Regular Exercise Yes   Activity/Exercise Type walking   Exercise Amount/Frequency 3-5 times/wk   Equipment Currently Used at Home none   Fall history within last six months no   Activity/Exercise/Self-Care Comment Pt reports being IND with ADLs at baseline. Pt reports participating in regular exercise with walking, however is limited by L LE knee and calf pain.   Instrumental Activities of Daily Living (IADL)   Previous Responsibilities meal prep;housekeeping;laundry;medication management;work;driving;shopping   IADL Comments Pt reports being IND in IADLs at baseline. Pt was working prior to hospitalization as well as driving.   General Information   Onset of Illness/Injury or Date of Surgery 03/07/25   Referring Physician Nori Mack MD   Patient/Family Therapy Goal Statement (OT) Return to home   Additional Occupational Profile Info/Pertinent History of Current Problem Per chart, 69 year old male with a history  of alcoholic cirrhosis complicated by hepatocellular carcinoma status post breast therapy with TACE and disease during donor liver  transplantation in 2018 who is admitted for NSTEMI for which hepatology is consulted for immunosuppression management.   Existing Precautions/Restrictions cardiac;fall   Left Upper Extremity (Weight-bearing Status) full weight-bearing (FWB)   Right Upper Extremity (Weight-bearing Status) full weight-bearing (FWB)   Left Lower Extremity (Weight-bearing Status) full weight-bearing (FWB)   Right Lower Extremity (Weight-bearing Status) full weight-bearing (FWB)   General Observations and Info Reason for consult: weakness   Cognitive Status Examination   Orientation Status orientation to person, place and time   Visual Perception   Visual Impairment/Limitations WNL   Sensory   Sensory Comments Not formally assessed   Pain Assessment   Patient Currently in Pain No   Posture   Posture not impaired   Posture Comments Sitting EOB   Range of Motion Comprehensive   Comment, General Range of Motion Appeared WNL/WFL, not formally assessed.   Strength Comprehensive (MMT)   Comment, General Manual Muscle Testing (MMT) Assessment B LE grossly deconditioned, not formally assessed   Muscle Tone Assessment   Muscle Tone Quick Adds No deficits were identified   Coordination   Upper Extremity Coordination No deficits were identified   Bed Mobility   Bed Mobility supine-sit   Supine-Sit [place] (Bed Mobility) contact guard   Comment (Bed Mobility) To EOB with Min vc's for sequencing   Transfers   Transfers sit-stand transfer;toilet transfer;shower transfer   Sit-Stand Transfer   Sit-Stand [place] (Transfers) contact guard   Sit/Stand Transfer Comments From EOB   Shower Transfer   Type (Shower Transfer) lateral   [place] Level (Shower Transfer) contact guard   Shower Transfer Comments Per clinical judgement   Toilet Transfer   Type (Toilet Transfer) sit-stand   [place] Level (Toilet Transfer) contact guard   Toilet Transfer Comments Per clinical judgement   Balance   Balance Assessment sitting static balance   Sitting  Balance: Static contact guard   Balance Comments Sitting EOB   Activities of Daily Living   BADL Assessment/Intervention lower body dressing;bathing;toileting;grooming   Bathing Assessment/Intervention   [place] Level (Bathing) supervision   Comment, (Bathing) Per clinical judgement   Assistive Devices (Bathing) shower chair   Lower Body Dressing Assessment/Training   Position (Lower Body Dressing) edge of bed sitting   Comment, (Lower Body Dressing) Per clinical judgement   [place] Level (Lower Body Dressing) contact guard assist   Grooming Assessment/Training   Position (Grooming) unsupported standing   [place] Level (Grooming) supervision;set up   Comment, (Grooming) Per clinical judgement   Toileting   Comment, (Toileting) Per clinical judgement   [place] Level (Toileting) supervision   Clinical Impression   Criteria for Skilled Therapeutic Interventions Met (OT) Yes, treatment indicated   OT Diagnosis Decreased IND in ADL/safety, functional mobility and overall activity tolerance.   OT Problem List-Impairments impacting ADL problems related to;activity tolerance impaired;balance;strength;pain   Assessment of Occupational Performance 3-5 Performance Deficits   Identified Performance Deficits Functional mobility, overall activity tolerance, functional transfers   Planned Therapy Interventions (OT) IADL retraining;ADL retraining;bed mobility training;strengthening;progressive activity/exercise   Clinical Decision Making Complexity (OT) problem focused assessment/low complexity   Risk & Benefits of therapy have been explained care plan/treatment goals reviewed;risks/benefits reviewed;participants included;participants voiced agreement with care plan;patient;spouse/significant other;daughter   Clinical Impression Comments Pt will benefit from skilled OT services during IP stay to progress IND in ADL/safety.   OT Total Evaluation Time   OT Eval, Low Complexity Minutes (08234) 5   OT Goals    Therapy Frequency (OT) 6 times/week   OT Predicted Duration/Target Date for Goal Attainment 04/09/25   OT Goals Lower Body Dressing;Lower Body Bathing;Bed Mobility;Toilet Transfer/Toileting;OT Goal 1   OT: Lower Body Dressing Independent   OT: Lower Body Bathing Independent   OT: Bed Mobility Independent   OT: Toilet Transfer/Toileting Independent   OT: Goal 1 Pt will demo safe stair navigation prior to dc.   Therapeutic Activities   Therapeutic Activity Minutes (14102) 19   Treatment Detail/Skilled Intervention OT: Facilitated increased IND in ADL/safety. Pt greeted semi-supine in bed, agreeable to OT session, no bed alarm on. Daughter and SO in room very supportive. RN in room okaying activity. Time taken for communication with daughter for interpretation throughout session. Pt agreeable to ambulation, kindly declining any ADL tasks at this time. Pt transfered semi-supine to sitting EOB with CGA for trunk control. While sitting EOB, Th donned gait belt. Pt completed STS with CGA from EOB. Pt ambulated ~180 ft to/from room with CGA, no AD, no LOB, and close follow or line management, all VSS. Pt took 1 x standing rest break in sunroom for ~1 min. Pt completed stand>sit with SBA to EOB. Pt transfered sitting EOB>semi-supine with SBA. Pt completed anterior scoot pushign through B LE with SBA and MIn vc's for sequencing. Time spent discussing dc recommendations and goals with Pt and family, all verbalizing agreement/understanding. At the end, Pt left semi-supine in bed, call light in reach, and all needs met, all VSS.   OT Discharge Planning   OT Plan OT: assess PT needs, stair navigation, activity tolerance   OT Discharge Recommendation (DC Rec) home with assist   OT Rationale for DC Rec Pt slightly below functional baseline due to decreased activity tolerance and functional mobility. Pt has good support at home, with a wife who is able to assist 24/7 upon dc. Ptable to ambukate on this date with SBA-CGA with no  AD. Anticipate once medically ready, Pt will be able to dc home with assist for heavier I/ADLs.   OT Brief overview of current status SBA-CGA no AD   OT Total Distance Amb During Session (feet) 180   Total Session Time   Timed Code Treatment Minutes 19   Total Session Time (sum of timed and untimed services) 24

## 2025-03-09 NOTE — PROVIDER NOTIFICATION
"Time of notification: 3:38 PM  Provider notified: Cora Augustine  Patient status: \"Hi pt has been back. Also he just had 16 beats of  beats per minute per tele person. He's also been [place] in the low 50's-40's. Lowest  HR 39. This started around 11am when he took a nap but t got a little better after waking up. Now he's sitting in the 40's.\" Sent to provider.  A&Ox4 VSS, asymptomatic. Denies chest pain at this time.  Temp:  [97.2  F (36.2  C)-98.6  F (37  C)] 97.8  F (36.6  C)  Pulse:  [] 59  Resp:  [17-20] 20  BP: ()/() 110/72  SpO2:  [93 %-100 %] 97 %  Orders received:  \"its likely from sedation but he also got higher dose of Diltiazem today. Just keep an eye on it.\" Massage from provider.  "

## 2025-03-09 NOTE — DISCHARGE SUMMARY
[address] [address]  p: [phone]    Discharge Summary: Cardiology Service    Loy Limon MRN# [id number]   YOB: 1955 Age: 69 year old       Admission Date: 3/07/2025  Discharge Date: 3/10/2025    Discharge Diagnoses:  # Chest pain  # NSTEMI  # Hx CAD s/p PCI to mLAD   # Hypertension   # Afib with RVR  # Hx VT on zio patch  # Post-transplant DM II  # Obesity: BMI 33.3  # s/p DDLT 2018  # Biliary stricture of transplanted liver  # CKD stage 2  # Hx Proteinuria  # Hypothyroidism    Brief HPI:  Loy Limon is a 69 year old male w PMHx latent TB s/p treatment, hepatocellular carcinoma s/p liver transplant in 2018, BPH, DMII, HTN, afib with hx RVR (on diltiazem and Eliquis), hypothyroidism, CKD, anemia who presented with concern for chest pain. Troponin elevated and was rising 70 --> 407 --> 2,753 --> 5,460 --> 5,3030. EKG with TWI's. Echocardiogram was normal and without WMA's. Since he had recently taken his Eliquis, he was medically managed with heparin and Nitroglycerine gtt's until he was taken to [facility] on 3/9/25. Coronary angiography revealed widely patent, previously deployed mLAD stent, dLAD lesion 30%, D1 50% and D2 70%. No interventions performed. The procedure was uncomplicated. He is referred for outpatient MRI and cardiology follow up.     Hospital Course by Diagnosis:  # Chest pain  # NSTEMI  # Hx CAD s/p PCI to mLAD   # Hypertension   TAVO score 4 (age, CAD risk factors, known CAD, positive trop). Nadia score  128. Presenting with chest pain that started in AM of admission, improve with nitroglycerin gtt. Troponin elevated and risin --> 407 --> 2,753 --> 5,460 --> 5,3030  EKG with new T wave inversions in V5/V6 but no obvious ST elevations. Cardiac risk factors - hx CAD, DM2, HTN, age, obesity, smoking hx. Echocardiogram 3/8 with normal EF and no WMA's.   DDX: most likely ACS. CTA with no PE, no  evidence of dissection.   - Continue ASA 81 mg daily   - Continue PTA atorvastatin 40mg   - Continue PTA Diltiazem at reduced dose of 180mg daily  - Continue Toprol XL 12.5mg daily --> up titrate as needed   - Continue PTA Hydralazine 25mg BID   - Cardiology follow up with IRVIN in 2 weeks  - Outpatient cardiac MRI      # Afib with RVR  # Hx VT on zio patch   Previously seen by Dr. Plascencia. Had Zio patch ordered which showed 100% afib burden, as well as 6 VT runs (fastest interval lasting 5 beats with max rate of 2018, the longest lasting 10 beats with an avg rate of 137 bpm).   - Rate Control: Continue PTA diltiazem 240mg daily  - Anticoagulation: Holding PTA Eliquis; continue heparin gtt  - Telemetry     # Post-transplant DM II  # Obesity: BMI 33.3  25 A1c was 8.4%.  He developed steroid/immunosuppressant induced diabetes and was treated with NPH insulin, now on glargine 20u qPM only (though does not take regularly) + metformin 1000mg BID. Last seen 25 by endocrine.    - Resume PTA Regimen w/ exception of Metformin --> OK to resume Metformin morning of 3/12/25     # s/p DDLT 2018  # Biliary stricture of transplanted liver  Follows with Dr. Galvan of hepatology- last seen 25. Ursodiol was stopped at this time.   Alcoholic cirrhosis complicated by hepatocellular carcinoma, status post bridge therapy with TACE, who in 2018 received a  donor liver transplantation. No history of rejection, no evidence of recurrent liver cancer. Compliant with meds- takes MMF 500mg BID and prednisone 5mg daily- not yet switched to tacro given CKD + proteinuria hx.   - Continue MMF 500mg BID + prednisone 5mg daily   - Outpatient follow up with transplant team as previously recommended     # CKD stage 2  # Hx Proteinuria  Per chart review, eGFR has been in 60s. Today Cr is 1.0 (around baseline), eGFR 80.  - BMP in 1-2 weeks     # Hypothyroidism  Most recent TSH done 25, was 1.79 (wnl).   - Continue PTA  levothyroxine    Pertinent Procedures:  1. Coronary Angiogram 3/9/25    Consults:  - Cardiac Rehab  - Transplant Hepatology    Medication Changes:  - START Toprol XL 12.5mg daily  - REDUCE Diltiazem to 180mg daily     Discharge medications:   Current Discharge Medication List        START taking these medications    Details   aspirin (ASA) 81 MG chewable tablet Take 1 tablet (81 mg) by mouth daily.  Qty: 90 tablet, Refills: 3    Associated Diagnoses: NSTEMI (non-ST elevated myocardial infarction) (H)      metoprolol succinate ER (TOPROL XL) 25 MG 24 hr tablet Take 0.5 tablets (12.5 mg) by mouth daily.  Qty: 45 tablet, Refills: 3    Associated Diagnoses: NSTEMI (non-ST elevated myocardial infarction) (H)           CONTINUE these medications which have CHANGED    Details   diltiazem ER COATED BEADS (CARDIZEM CD/CARTIA XT) 120 MG 24 hr capsule Take 1 capsule (120 mg) by mouth daily.  Qty: 90 capsule, Refills: 3    Associated Diagnoses: Chronic atrial fibrillation (H)           CONTINUE these medications which have NOT CHANGED    Details   atorvastatin (LIPITOR) 40 MG tablet Take 1 tablet (40 mg) by mouth daily.  Qty: 90 tablet, Refills: 2    Associated Diagnoses: DM type 2, goal HbA1c 7%-8% (H)      hydrALAZINE (APRESOLINE) 25 MG tablet Take 1 tablet (25 mg) by mouth 2 times daily.  Qty: 180 tablet, Refills: 3    Associated Diagnoses: Primary hypertension      insulin glargine (LANTUS SOLOSTAR) 100 UNIT/ML pen Inject 20 Units Subcutaneous at bedtime  Qty: 15 mL, Refills: 1    Comments: If Lantus is not covered by insurance, may substitute Basaglar or Semglee or other insulin glargine product per insurance preference at same dose and frequency.    Associated Diagnoses: Type 2 diabetes mellitus with other specified complication, with long-term current use of insulin (H)      levothyroxine (SYNTHROID/LEVOTHROID) 150 MCG tablet Take 1 tablet (150 mcg) by mouth daily.  Qty: 90 tablet, Refills: 3    Associated Diagnoses:  Other specified hypothyroidism      metFORMIN (GLUCOPHAGE XR) 500 MG 24 hr tablet Take 2 tablets (1,000 mg) by mouth 2 times daily (with meals).  Qty: 360 tablet, Refills: 3    Associated Diagnoses: Type 2 diabetes mellitus with other specified complication, with long-term current use of insulin (H)      mycophenolate (GENERIC EQUIVALENT) 500 MG tablet Take 1 tablet (500 mg) by mouth 2 times daily  Qty: 180 tablet, Refills: 3    Comments: TXP DT 12/22/2018 (Liver) TXP Dischg DT 1/7/2019 DX Liver replaced by transplant Z94.4 TX Center [facility] ([place], [place])  Associated Diagnoses: Liver replaced by transplant (H)      predniSONE (DELTASONE) 5 MG tablet Take 1 tablet (5 mg) by mouth daily  Qty: 90 tablet, Refills: 1    Associated Diagnoses: Liver transplanted (H)      ursodiol (ACTIGALL) 250 MG tablet Take 1 tablet (250 mg) by mouth 2 times daily  Qty: 180 tablet, Refills: 3    Associated Diagnoses: Liver transplanted (H)      apixaban ANTICOAGULANT (ELIQUIS) 5 MG tablet Take 1 tablet (5 mg) by mouth 2 times daily  Qty: 60 tablet, Refills: 1    Associated Diagnoses: Tachycardia           STOP taking these medications       albuterol (PROAIR HFA/PROVENTIL HFA/VENTOLIN HFA) 108 (90 Base) MCG/ACT inhaler Comments:   Reason for Stopping:         Alcohol Swabs PADS Comments:   Reason for Stopping:         blood glucose (NO BRAND SPECIFIED) lancets standard Comments:   Reason for Stopping:         blood glucose (NO BRAND SPECIFIED) test strip Comments:   Reason for Stopping:         blood glucose monitoring (NO BRAND SPECIFIED) meter device kit Comments:   Reason for Stopping:         fluticasone (FLONASE) 50 MCG/ACT nasal spray Comments:   Reason for Stopping:         guaiFENesin (MUCINEX) 600 MG 12 hr tablet Comments:   Reason for Stopping:         insulin pen needle (31G X 5 MM) 31G X 5 MM miscellaneous Comments:   Reason for Stopping:         oxyCODONE (ROXICODONE) 5 MG tablet  Comments:   Reason for Stopping:         Vitamin D3 (CHOLECALCIFEROL) 25 mcg (1000 units) tablet Comments:   Reason for Stopping:               Follow-up:  - Cardiology IRVIN follow up in 2 weeks  - PCP follow up in 7-10 days     Labs or imaging requiring follow-up after discharge:  - CBC, BMP in 2 weeks  - Cardiac MRI     Code status:  Full    Condition on discharge  Temp:  [97.6  F (36.4  C)-98.6  F (37  C)] 97.6  F (36.4  C)  Pulse:  [] 58  Resp:  [17-20] 18  BP: ()/() 95/69  SpO2:  [93 %-100 %] 96 %    General: NAD  HEENT:  PERRLA, EOMI.   Neck: JVD not elevated.   CV: RRR. No murmur appreciated. No rubs or gallops. Peripheral radial pulse intact.  Resp: No increased work of breathing or use of accessory muscles, breathing comfortably on room air.  Lung sounds clear throughout/bilaterally  Abdomen:  Normal active bowel sounds.  Abdomen is soft. No distension, non-tender to palpation.    Extremities: Warm. Capillary refill less than 3 sec. 3/4 radial pulses bilaterally.  3/4 pedal pulses bilaterally. No pre-tibial edema. No cyanosis or clubbing.  Skin:  Warm and dry. No erythema, rashes, ulceration or diaphoresis.  Neuro: Alert and oriented x3    Imaging with results:  3/8/25 EK/13/24 Echocardiogram:  Global and regional left ventricular function is normal with an EF of 55-60%.  Right ventricular function, chamber size, wall motion, and thickness are  normal.  Severe left atrial enlargement is present.  The inferior vena cava is normal.  No pericardial effusion is present.  There is no prior study for direct comparison.    3/9/25 Coronary Angiogram:    Mid LAD lesion is 20% stenosed.    Dist LAD lesion is 30% stenosed.    1st Diag lesion is 50% stenosed.    2nd Diag lesion is 70% stenosed.     1.left dominant coronary artery system  2.widely patent mid LAD stent  3.70 percent stenotic lesion in second diagonal which is unchanged from prior study in .  This vessel is a small and  clinically insignificant vessel.        Patient Care Team:  Jaswinder Anne MD as PCP - General (Family Medicine)  Dario Anguiano MD as MD (Vascular and Interventional Radiology)  David Champagne MD (Gastroenterology)  Mathew Self MD as MD (Urology)  Emil Echevarria MD as MD (Transplant)  Gilma Sandoval, RN as Clinic Care Coordinator  Kenrick Casiano MD as MD (Urology)  Jaswinder Anne MD as Referring Physician (Family Practice)  Gilma Guthrie PA-C as Physician Assistant (INTERNAL MEDICINE - ENDOCRINOLOGY, DIABETES & METABOLISM)  Hi Melgar MD as Assigned Cancer Care Provider  Jaswinder Anne MD as Assigned PCP  Jay Patel OD as MD (Optometry)  Danielle Hardwick RPH as Pharmacist (Pharmacist Clinician- Clinical Pharmacy Specialist)  Kevin Gonzalez MD as MD (Dermatology)  Jay Patel OD as MD (Optometry)  Nuvia Pascual PA as Physician Assistant (Urology)  Blaze Kaminski DO as MD (Neurology)  Blaze Kaminski DO as Assigned Neuroscience Provider  Jaswinder Anne MD ([facility])  Colin Duarte MD as Hospitalist (Internal Medicine)  Rolly Plascencia MD as MD (Cardiovascular Disease)  Jaswinder Anne MD as MD (Family Medicine)  Blaze Kaminski DO as Physician (Neurology)  Izabela Galvan MD as MD (Gastroenterology)  Richard Maharaj MD as MD (Critical Care)  Alessandra Capone MD as Assigned Infectious Disease Provider  Rolly Plascencia MD as Assigned Heart and Vascular Provider  Dez Aragon PA-C as Assigned Surgical Provider  Chase Mendieta RPH as Pharmacist  Chase Mendieta RPH as Assigned MTM Pharmacist  Izabela Galvan MD as Assigned Gastroenterology Provider  Sue Tavares MD as Assigned Endocrinology Provider    Time Spent on this Encounter   I, Cora Augustine, EZRA DIXON, personally saw the patient today and spent greater than  30 minutes discharging this patient.     Patient discussed with staff cardiologist, Dr. Swift, who agrees with the above documentation and plan. Documentation represents joint decision making.     EZRA Rdz CNP on 3/10/2025 at 11:04 AM  [facility] Cardiology    This is shared discharge summary with Cora Augustine APRN CNP       I saw and evaluated patient with NP. I examined patient with NPI discussed the results with patient and NP. I discussed our plan with patient and NP.  I agree with NP and I edited the NP's note to make it a more comprehensive document.    Discharge Diagnoses:  # Chest pain  # NSTEMI  # Hx CAD s/p PCI to mLAD 2021  # Hypertension   # Afib with RVR  # Hx VT on zio patch  # Post-transplant DM II  # Obesity: BMI 33.3  # s/p DDLT 2018  # Biliary stricture of transplanted liver  # CKD stage 2  # Hx Proteinuria  # Hypothyroidism    Brief HPI:  Loy Limon is a 69 year old male w PMHx latent TB s/p treatment, hepatocellular carcinoma s/p liver transplant in 2018, BPH, DMII, HTN, afib with hx RVR (on diltiazem and Eliquis), hypothyroidism, CKD, anemia who presented with concern for chest pain. Troponin elevated and was rising 70 --> 407 --> 2,753 --> 5,460 --> 5,3030. EKG with TWI's. Echocardiogram was normal and without WMA's. Since he had recently taken his Eliquis, he was medically managed with heparin and Nitroglycerine gtt's until he was taken to [facility] on 3/9/25. Coronary angiography revealed widely patent, previously deployed mLAD stent, dLAD lesion 30%, D1 50% and D2 70%. No interventions performed. The procedure was uncomplicated. He is referred for outpatient MRI and cardiology follow up.     Hospital Course by Diagnosis:  # Chest pain  # NSTEMI  # Hx CAD s/p PCI to mLAD 2021  # Hypertension   TAVO score 4 (age, CAD risk factors, known CAD, positive trop). Nadia score  128. Presenting with chest pain that started in AM of admission, improve with nitroglycerin gtt.  Troponin elevated and risin --> 407 --> 2,753 --> 5,460 --> 5,3030  EKG with new T wave inversions in V5/V6 but no obvious ST elevations. Cardiac risk factors - hx CAD, DM2, HTN, age, obesity, smoking hx. Echocardiogram 3/8 with normal EF and no WMA's.   DDX: most likely ACS. CTA with no PE, no evidence of dissection.   - Continue ASA 81 mg daily   - Continue PTA atorvastatin 40mg   - Continue PTA Diltiazem at reduced dose of 180mg daily  - Continue Toprol XL 12.5mg daily --> up titrate as needed   - Continue PTA Hydralazine 25mg BID   - Cardiology follow up with IRVIN in 2 weeks  - Outpatient cardiac MRI      # Afib with RVR  # Hx VT on zio patch   Previously seen by Dr. Plascencia. Had Zio patch ordered which showed 100% afib burden, as well as 6 VT runs (fastest interval lasting 5 beats with max rate of 2018, the longest lasting 10 beats with an avg rate of 137 bpm).   - Rate Control: Continue PTA diltiazem 240mg daily  - Anticoagulation: Holding PTA Eliquis; continue heparin gtt  - Telemetry     # Post-transplant DM II  # Obesity: BMI 33.3  25 A1c was 8.4%.  He developed steroid/immunosuppressant induced diabetes and was treated with NPH insulin, now on glargine 20u qPM only (though does not take regularly) + metformin 1000mg BID. Last seen 25 by endocrine.    - Resume PTA Regimen w/ exception of Metformin --> OK to resume Metformin morning of 3/12/25     # s/p DDLT 2018  # Biliary stricture of transplanted liver  Follows with Dr. Galvan of hepatology- last seen 25. Ursodiol was stopped at this time.   Alcoholic cirrhosis complicated by hepatocellular carcinoma, status post bridge therapy with TACE, who in 2018 received a  donor liver transplantation. No history of rejection, no evidence of recurrent liver cancer. Compliant with meds- takes MMF 500mg BID and prednisone 5mg daily- not yet switched to tacro given CKD + proteinuria hx.   - Continue MMF 500mg BID + prednisone 5mg daily   -  Outpatient follow up with transplant team as previously recommended     # CKD stage 2  # Hx Proteinuria  Per chart review, eGFR has been in 60s. Today Cr is 1.0 (around baseline), eGFR 80.  - BMP in 1-2 weeks     # Hypothyroidism  Most recent TSH done 1/7/25, was 1.79 (wnl).   - Continue PTA levothyroxine    Pertinent Procedures:  1. Coronary Angiogram 3/9/25    Consults:  - Cardiac Rehab  - Transplant Hepatology    Medication Changes:  - START Toprol XL 12.5mg daily  - REDUCE Diltiazem to 180mg daily       Follow-up:  - Cardiology IRVIN follow up in 2 weeks  - PCP follow up in 7-10 days     Labs or imaging requiring follow-up after discharge:  - CBC, BMP in 2 weeks  - Cardiac MRI     Code status:  Full    Condition on discharge  Temp:  [97.6  F (36.4  C)-98.6  F (37  C)] 97.6  F (36.4  C)  Pulse:  [] 58  Resp:  [17-20] 18  BP: ()/() 95/69  SpO2:  [93 %-100 %] 96 %    During discharge I examined patient with Cora Augustine APRN CNP     General: NAD  HEENT:  PERRLA, EOMI.   Neck: JVD not elevated.   CV: RRR. No murmur appreciated. No rubs or gallops. Peripheral radial pulse intact.  Resp: No increased work of breathing or use of accessory muscles, breathing comfortably on room air.  Lung sounds clear throughout/bilaterally  Abdomen:  Normal active bowel sounds.  Abdomen is soft. No distension, non-tender to palpation.    Extremities: Warm. Capillary refill less than 3 sec. 3/4 radial pulses bilaterally.  3/4 pedal pulses bilaterally. No pre-tibial edema. No cyanosis or clubbing.  Skin:  Warm and dry. No erythema, rashes, ulceration or diaphoresis.  Neuro: Alert and oriented x3    I discussed the results of EKG, Echocardiogram and Coronary angiogram with patient and NP    During discharge I spend 35 min directly with patient and NP    Ryan Swift MD, PhD  Professor of Medicine  Division of Cardiology

## 2025-03-09 NOTE — PLAN OF CARE
Admission          3/7/2025  4:00 PM  -----------------------------------------------------------  Reason for admission: NSTEMI  Primary team notified of pt arrival.  Admitted from: ED  Via: stretcher  Accompanied by: family  Belongings: Placed in closet; valuables sent home with family  Admission Profile: complete  Teaching: orientation to unit and call light- call light within reach, call don't fall, use of console, meal times, when to call for the RN, and enforced importance of safety   Access: PIV  Telemetry:Placed on pt  Ht./Wt.: complete  Code Status verified on armband: yes  2 RN Skin Assessment Completed By:  Miranda Meredith  Med Rec completed: competed already  Suction/Ambu bag/Flowmeter at bedside: yes  Is patient having diarrhea upon admission- if YES fill out testing algorithm : no    Temp:  [97.6  F (36.4  C)-97.9  F (36.6  C)] 97.6  F (36.4  C)  Pulse:  [] 110  Resp:  [17-18] 17  BP: (111-169)/() 133/90  SpO2:  [93 %-100 %] 97 %    Pt status: A&Ox4 Vietnamese speaking.  used. VSS. On tele, pt in A fib with HR of 90's-1teens. Denies chest pain. Sating >92% on RA. Adequate urine output. SBA up to the bathroom, steady gait. Tolerating low fat/Na diet. NPO at midnight for procedure, pt aware. No skin issues other than some bruising on extremities. 2 PIV infusing nitroglycerin at 80mcg/min and Heparin at 1050.  PTT therapeutic, next draw at 0040.       Plan: Continue with POC. Notify primary team with changes.         Goal Outcome Evaluation:      Plan of Care Reviewed With: patient    Overall Patient Progress: improvingOverall Patient Progress: improving    Outcome Evaluation: Pt denies chest pain. Nitro gtt inufsing at 80mcg. VSS. Sating >92% on RA

## 2025-03-09 NOTE — PLAN OF CARE
Hours of Care: [id number]  Neuro: A&Ox4. Persian speaking.  Cardiac: Afib. Developed chest pain at 1000 that resolved with nitroglycerin gtt increase, provider notified. Coronary angiogram at 1400. HR 50's-40's but intermittently dipping to high 30's. Notified night CARDS 1, aware of pts HR x1 EKG ordered at 1900. VSS.   Respiratory: Sating >92% on RA. Denies SOB  GI/: Adequate urine output. No BM. AN Miralax given.  Diet/appetite: Tolerating low fat/low sodium diet. Eating well.  Activity:  SBA, up to chair and in halls.  Pain: At acceptable level on current regimen.   Skin: No new deficits noted.  LDA's: 2 L PIVs. NS running at 75ml/hr.     Plan: Continue with POC. Notify primary team with changes.       Goal Outcome Evaluation:      Plan of Care Reviewed With: patient    Overall Patient Progress: no changeOverall Patient Progress: no change    Outcome Evaluation: Denies chest pain. NS running @ 75ml/hr. Sating >92% on RA.

## 2025-03-09 NOTE — PLAN OF CARE
Goal Outcome Evaluation:  Neuro: A&Ox4. Armenian speaking,  used for communication.   Cardiac: AFIB, HR 70s-90s  Respiratory: Sating >92% on RA.  GI/: Adequate urine output. No BM this shift.  Diet/appetite: Low saturated fat/ low sodium diet. NPO since midnight.   Activity:  SBA, up to chair.  Pain: Denies pain.   Skin: No new deficits noted. CHG bath wipes comepleted.   LDA's: 2 LPIV infusing nitroglycerin gtt at 80 mcg/min and hepain gtt at 1050 units/hr. PTT therapeutic, AM redraw.     Plan: Possible angiogram today. Continue with POC. Notify primary team with changes.     Plan of Care Reviewed With: patient    Overall Patient Progress: no changeOverall Patient Progress: no change    Outcome Evaluation: Denies chest pain, Nitro gtt and heparin gtt infusing, satting >92% on RA

## 2025-03-09 NOTE — PRE-PROCEDURE
Consenting/Education for Cardiology Procedure: Possible percutaneous intervention and Coronary angiogram    Patient understands we would like to perform the listed procedure(s) due to NSTEMI.    The patient understands the following:     The procedure was described to the patient in detail.    Moderate sedation is required for this procedure and the risks, benefits and alternatives to moderate sedation were discussed. Patient also understands risks and complications of the procedure which include but are not limited to bruising/swelling around the incision site, infection, bleeding, allergic reaction to local anesthetic, air embolism, arterial puncture, stroke, heart attack, need for emergency surgery, death.    Patient verbalized understanding of risks and benefits and has elected to proceed with the procedure or procedures listed above.    EZRA Rdz CNP  Cardiology

## 2025-03-10 ENCOUNTER — PATIENT OUTREACH (OUTPATIENT)
Dept: CARE COORDINATION | Facility: CLINIC | Age: 70
End: 2025-03-10
Payer: COMMERCIAL

## 2025-03-10 ENCOUNTER — TELEPHONE (OUTPATIENT)
Dept: CARDIOLOGY | Facility: CLINIC | Age: 70
End: 2025-03-10
Payer: COMMERCIAL

## 2025-03-10 VITALS
RESPIRATION RATE: 20 BRPM | OXYGEN SATURATION: 98 % | DIASTOLIC BLOOD PRESSURE: 74 MMHG | TEMPERATURE: 97.9 F | HEART RATE: 63 BPM | SYSTOLIC BLOOD PRESSURE: 134 MMHG | BODY MASS INDEX: 32.37 KG/M2 | WEIGHT: 189.6 LBS | HEIGHT: 64 IN

## 2025-03-10 DIAGNOSIS — R00.0 TACHYCARDIA: ICD-10-CM

## 2025-03-10 LAB
ALBUMIN SERPL BCG-MCNC: 2.9 G/DL (ref 3.5–5.2)
ALP SERPL-CCNC: 106 U/L (ref 40–150)
ALT SERPL W P-5'-P-CCNC: 27 U/L (ref 0–70)
ANION GAP SERPL CALCULATED.3IONS-SCNC: 9 MMOL/L (ref 7–15)
APTT PPP: 34 SECONDS (ref 22–38)
AST SERPL W P-5'-P-CCNC: 46 U/L (ref 0–45)
ATRIAL RATE - MUSE: 326 BPM
ATRIAL RATE - MUSE: NORMAL BPM
BILIRUB SERPL-MCNC: 0.4 MG/DL
BUN SERPL-MCNC: 19.9 MG/DL (ref 8–23)
CALCIUM SERPL-MCNC: 8.5 MG/DL (ref 8.8–10.4)
CHLORIDE SERPL-SCNC: 106 MMOL/L (ref 98–107)
CREAT SERPL-MCNC: 1.29 MG/DL (ref 0.67–1.17)
DIASTOLIC BLOOD PRESSURE - MUSE: NORMAL MMHG
EGFRCR SERPLBLD CKD-EPI 2021: 59 ML/MIN/1.73M2
ERYTHROCYTE [DISTWIDTH] IN BLOOD BY AUTOMATED COUNT: 13.1 % (ref 10–15)
GLUCOSE BLDC GLUCOMTR-MCNC: 111 MG/DL (ref 70–99)
GLUCOSE BLDC GLUCOMTR-MCNC: 124 MG/DL (ref 70–99)
GLUCOSE BLDC GLUCOMTR-MCNC: 243 MG/DL (ref 70–99)
GLUCOSE SERPL-MCNC: 132 MG/DL (ref 70–99)
HCO3 SERPL-SCNC: 24 MMOL/L (ref 22–29)
HCT VFR BLD AUTO: 41 % (ref 40–53)
HGB BLD-MCNC: 13 G/DL (ref 13.3–17.7)
INTERPRETATION ECG - MUSE: NORMAL
MAGNESIUM SERPL-MCNC: 2.1 MG/DL (ref 1.7–2.3)
MCH RBC QN AUTO: 28.4 PG (ref 26.5–33)
MCHC RBC AUTO-ENTMCNC: 31.7 G/DL (ref 31.5–36.5)
MCV RBC AUTO: 90 FL (ref 78–100)
P AXIS - MUSE: NORMAL DEGREES
PLATELET # BLD AUTO: 194 10E3/UL (ref 150–450)
POTASSIUM SERPL-SCNC: 4.1 MMOL/L (ref 3.4–5.3)
PR INTERVAL - MUSE: NORMAL MS
PROT SERPL-MCNC: 5.4 G/DL (ref 6.4–8.3)
QRS DURATION - MUSE: 102 MS
QRS DURATION - MUSE: 86 MS
QRS DURATION - MUSE: 86 MS
QRS DURATION - MUSE: 90 MS
QRS DURATION - MUSE: 92 MS
QRS DURATION - MUSE: 94 MS
QRS DURATION - MUSE: 94 MS
QRS DURATION - MUSE: 96 MS
QRS DURATION - MUSE: 98 MS
QT - MUSE: 364 MS
QT - MUSE: 368 MS
QT - MUSE: 378 MS
QT - MUSE: 400 MS
QT - MUSE: 426 MS
QT - MUSE: 428 MS
QT - MUSE: 434 MS
QT - MUSE: 448 MS
QT - MUSE: 454 MS
QT - MUSE: 470 MS
QT - MUSE: 470 MS
QT - MUSE: 486 MS
QT - MUSE: 488 MS
QT - MUSE: 532 MS
QT - MUSE: 534 MS
QT - MUSE: 542 MS
QTC - MUSE: 413 MS
QTC - MUSE: 422 MS
QTC - MUSE: 444 MS
QTC - MUSE: 445 MS
QTC - MUSE: 445 MS
QTC - MUSE: 450 MS
QTC - MUSE: 458 MS
QTC - MUSE: 460 MS
QTC - MUSE: 465 MS
QTC - MUSE: 475 MS
QTC - MUSE: 481 MS
QTC - MUSE: 482 MS
QTC - MUSE: 484 MS
QTC - MUSE: 488 MS
QTC - MUSE: 490 MS
QTC - MUSE: 494 MS
R AXIS - MUSE: -20 DEGREES
R AXIS - MUSE: -22 DEGREES
R AXIS - MUSE: -23 DEGREES
R AXIS - MUSE: -24 DEGREES
R AXIS - MUSE: -25 DEGREES
R AXIS - MUSE: -26 DEGREES
R AXIS - MUSE: -28 DEGREES
R AXIS - MUSE: -28 DEGREES
R AXIS - MUSE: -29 DEGREES
R AXIS - MUSE: -30 DEGREES
R AXIS - MUSE: -7 DEGREES
RBC # BLD AUTO: 4.57 10E6/UL (ref 4.4–5.9)
SODIUM SERPL-SCNC: 139 MMOL/L (ref 135–145)
SYSTOLIC BLOOD PRESSURE - MUSE: NORMAL MMHG
T AXIS - MUSE: 124 DEGREES
T AXIS - MUSE: 126 DEGREES
T AXIS - MUSE: 129 DEGREES
T AXIS - MUSE: 133 DEGREES
T AXIS - MUSE: 135 DEGREES
T AXIS - MUSE: 136 DEGREES
T AXIS - MUSE: 136 DEGREES
T AXIS - MUSE: 137 DEGREES
T AXIS - MUSE: 137 DEGREES
T AXIS - MUSE: 138 DEGREES
T AXIS - MUSE: 144 DEGREES
T AXIS - MUSE: 145 DEGREES
T AXIS - MUSE: 146 DEGREES
T AXIS - MUSE: 147 DEGREES
T AXIS - MUSE: 154 DEGREES
T AXIS - MUSE: 159 DEGREES
VENTRICULAR RATE- MUSE: 101 BPM
VENTRICULAR RATE- MUSE: 48 BPM
VENTRICULAR RATE- MUSE: 48 BPM
VENTRICULAR RATE- MUSE: 49 BPM
VENTRICULAR RATE- MUSE: 50 BPM
VENTRICULAR RATE- MUSE: 54 BPM
VENTRICULAR RATE- MUSE: 60 BPM
VENTRICULAR RATE- MUSE: 63 BPM
VENTRICULAR RATE- MUSE: 65 BPM
VENTRICULAR RATE- MUSE: 76 BPM
VENTRICULAR RATE- MUSE: 79 BPM
VENTRICULAR RATE- MUSE: 81 BPM
VENTRICULAR RATE- MUSE: 81 BPM
VENTRICULAR RATE- MUSE: 90 BPM
WBC # BLD AUTO: 5.5 10E3/UL (ref 4–11)

## 2025-03-10 PROCEDURE — 36415 COLL VENOUS BLD VENIPUNCTURE: CPT

## 2025-03-10 PROCEDURE — 250N000012 HC RX MED GY IP 250 OP 636 PS 637

## 2025-03-10 PROCEDURE — 85014 HEMATOCRIT: CPT

## 2025-03-10 PROCEDURE — 93010 ELECTROCARDIOGRAM REPORT: CPT | Performed by: INTERNAL MEDICINE

## 2025-03-10 PROCEDURE — 83735 ASSAY OF MAGNESIUM: CPT | Performed by: INTERNAL MEDICINE

## 2025-03-10 PROCEDURE — 85041 AUTOMATED RBC COUNT: CPT

## 2025-03-10 PROCEDURE — 250N000013 HC RX MED GY IP 250 OP 250 PS 637

## 2025-03-10 PROCEDURE — 85730 THROMBOPLASTIN TIME PARTIAL: CPT | Performed by: INTERNAL MEDICINE

## 2025-03-10 PROCEDURE — 84155 ASSAY OF PROTEIN SERUM: CPT

## 2025-03-10 PROCEDURE — 82310 ASSAY OF CALCIUM: CPT

## 2025-03-10 PROCEDURE — 99239 HOSP IP/OBS DSCHRG MGMT >30: CPT | Mod: FS

## 2025-03-10 PROCEDURE — 93005 ELECTROCARDIOGRAM TRACING: CPT

## 2025-03-10 PROCEDURE — 250N000013 HC RX MED GY IP 250 OP 250 PS 637: Performed by: INTERNAL MEDICINE

## 2025-03-10 RX ORDER — ASPIRIN 81 MG/1
81 TABLET, CHEWABLE ORAL DAILY
Qty: 90 TABLET | Refills: 3 | Status: SHIPPED | OUTPATIENT
Start: 2025-03-11

## 2025-03-10 RX ORDER — METOPROLOL SUCCINATE 25 MG/1
12.5 TABLET, EXTENDED RELEASE ORAL DAILY
Qty: 45 TABLET | Refills: 3 | Status: SHIPPED | OUTPATIENT
Start: 2025-03-11

## 2025-03-10 RX ORDER — DILTIAZEM HYDROCHLORIDE 120 MG/1
120 CAPSULE, COATED, EXTENDED RELEASE ORAL DAILY
Qty: 90 CAPSULE | Refills: 3 | Status: SHIPPED | OUTPATIENT
Start: 2025-03-10

## 2025-03-10 RX ORDER — DILTIAZEM HYDROCHLORIDE 120 MG/1
120 CAPSULE, COATED, EXTENDED RELEASE ORAL DAILY
Status: DISCONTINUED | OUTPATIENT
Start: 2025-03-11 | End: 2025-03-10

## 2025-03-10 RX ORDER — DILTIAZEM HYDROCHLORIDE 120 MG/1
120 CAPSULE, COATED, EXTENDED RELEASE ORAL DAILY
Status: DISCONTINUED | OUTPATIENT
Start: 2025-03-10 | End: 2025-03-10 | Stop reason: HOSPADM

## 2025-03-10 RX ADMIN — DILTIAZEM HYDROCHLORIDE 120 MG: 120 CAPSULE, COATED, EXTENDED RELEASE ORAL at 10:40

## 2025-03-10 RX ADMIN — HYDRALAZINE HYDROCHLORIDE 25 MG: 25 TABLET ORAL at 08:46

## 2025-03-10 RX ADMIN — LEVOTHYROXINE SODIUM 150 MCG: 0.07 TABLET ORAL at 08:46

## 2025-03-10 RX ADMIN — APIXABAN 5 MG: 5 TABLET, FILM COATED ORAL at 08:46

## 2025-03-10 RX ADMIN — PANTOPRAZOLE SODIUM 40 MG: 40 TABLET, DELAYED RELEASE ORAL at 08:45

## 2025-03-10 RX ADMIN — POLYETHYLENE GLYCOL 3350 17 G: 17 POWDER, FOR SOLUTION ORAL at 08:52

## 2025-03-10 RX ADMIN — GUAIFENESIN 1200 MG: 600 TABLET ORAL at 08:46

## 2025-03-10 RX ADMIN — PREDNISONE 5 MG: 5 TABLET ORAL at 08:46

## 2025-03-10 RX ADMIN — INSULIN ASPART 3 UNITS: 100 INJECTION, SOLUTION INTRAVENOUS; SUBCUTANEOUS at 10:40

## 2025-03-10 RX ADMIN — Medication 50 MCG: at 08:45

## 2025-03-10 RX ADMIN — ATORVASTATIN CALCIUM 40 MG: 40 TABLET, FILM COATED ORAL at 08:45

## 2025-03-10 RX ADMIN — ASPIRIN 81 MG CHEWABLE TABLET 81 MG: 81 TABLET CHEWABLE at 08:46

## 2025-03-10 RX ADMIN — MYCOPHENOLATE MOFETIL 500 MG: 500 TABLET, FILM COATED ORAL at 08:46

## 2025-03-10 RX ADMIN — Medication 12.5 MG: at 08:46

## 2025-03-10 ASSESSMENT — ACTIVITIES OF DAILY LIVING (ADL)
ADLS_ACUITY_SCORE: 42
ADLS_ACUITY_SCORE: 42
ADLS_ACUITY_SCORE: 39
ADLS_ACUITY_SCORE: 42
DEPENDENT_IADLS:: INDEPENDENT
ADLS_ACUITY_SCORE: 42

## 2025-03-10 NOTE — TELEPHONE ENCOUNTER
Called and let the patient know that he should restart his anticoagulation as stated below.  Questions were answered.    Jacques Caballero RN BSN  Cardiology Nurse Coordinator  -------------------------  Cath results on Sunday. Pt saw Thais in January, appears to hopefully be getting discharged today     Access: R radial       Conclusion     Mid LAD lesion is 20% stenosed.      Dist LAD lesion is 30% stenosed.      1st Diag lesion is 50% stenosed.      2nd Diag lesion is 70% stenosed.      1.left dominant coronary artery system   2.widely patent mid LAD stent   3.70 percent stenotic lesion in second diagonal which is unchanged from prior study in 2021.  This vessel is a small and clinically insignificant vessel.        Plan   1.continue secondary CAD risk factor modification   2.okay to restart home dose anticoagulation tomorrow morning     ThanksKisha

## 2025-03-10 NOTE — DISCHARGE SUMMARY
DC instructions given to pt and Daughter, verbalized understanding.  All belongings with pt, IV DC'd and documented.

## 2025-03-10 NOTE — PROGRESS NOTES
ICU End of Shift Summary. See flowsheets for vital signs and detailed assessment.  Care from [phone]  Changes this shift: AOx4. Follows commands. On RA. Having episodes of kim with rates in 40s. MD notified and orders placed to notify. Able to make needs known.    Plan:  Notify team in HR sustained <40 or symptomatic bradycardia .

## 2025-03-10 NOTE — PLAN OF CARE
Problem: Adult Inpatient Plan of Care  Goal: Plan of Care Review  Description: The Plan of Care Review/Shift note should be completed every shift.  The Outcome Evaluation is a brief statement about your assessment that the patient is improving, declining, or no change.  This information will be displayed automatically on your shift  note.  Flowsheets (Taken 3/10/2025 1130)  Outcome Evaluation: CMA completed.  Resources given.  No other care management needs identitied.  Plan of Care Reviewed With: patient  Goal: Readiness for Transition of Care  Recent Flowsheet Documentation  Taken 3/10/2025 1100 by Cristina Sheriff RN  Transportation Anticipated: family or friend will provide  Concerns to be Addressed: financial/insurance  Intervention: Mutually Develop Transition Plan  Recent Flowsheet Documentation  Taken 3/10/2025 1100 by Cristina Sheriff RN  Readmission Within the Last 30 Days: no previous admission in last 30 days  Transportation Anticipated: family or friend will provide  Transportation Concerns: none  Concerns to be Addressed: financial/insurance  Patient/Family Anticipated Services at Transition: none  Patient/Family Anticipates Transition to: home  Equipment Currently Used at Home: none

## 2025-03-10 NOTE — PROVIDER NOTIFICATION
Time of notification: 12:34  Provider notified: Marietta Romo  Patient status:  Pt had 11 beats of Vtach. Heart rate has been dropping below 40 frequently but it increases within a few minutes. Afib rhythm with frequent pauses. Denies chest pain, dizziness, or shortness of breath.   Temp:  [97.2  F (36.2  C)-98  F (36.7  C)] 97.5  F (36.4  C)  Pulse:  [47-99] 55  Resp:  [18-20] 18  BP: ()/() 139/82  SpO2:  [93 %-100 %] 98 %

## 2025-03-10 NOTE — PLAN OF CARE
Goal Outcome Evaluation:  Neuro: A&Ox4. Lebanese speaking,  utilized.   Cardiac: Afib rhythm with frequent pauses. Asymptomatic bradycardia, frequently dropping to high 30s unsustained, lowest HR 36. 11 beats of VT at 0033 (provider notified), 6 beat run of VT at 0224. Denies chest pain or dizziness.     Respiratory: Sating >92% on RA.  GI/: Adequate urine output. No BM.   Diet/appetite: low saturated fat/low sodium diet. Denies N/V.   Activity:  SBA  Pain: Denies.   Skin: No new deficits noted.  LDA's: 2LPIV (SL)    Plan: Notify provider for symptomatic or sustained bradycardia <40. Continue with POC. Notify primary team with changes.       Plan of Care Reviewed With: patient    Overall Patient Progress: no changeOverall Patient Progress: no change    Outcome Evaluation: Denies chest pain, AFib rhythm with pauses, bradycardic, heart rate frequently dropping to high 30s unsustained, 2 runs of Vtach.

## 2025-03-10 NOTE — CONSULTS
Care Management Initial Consult    General Information  Assessment completed with: Patient,         Primary Care Provider verified and updated as needed:     Readmission within the last 30 days: no previous admission in last 30 days      Reason for Consult: discharge planning, financial concerns  Advance Care Planning: Advance Care Planning Reviewed: no concerns identified          Communication Assessment  Patient's communication style: spoken language (non-English)    Hearing Difficulty or Deaf: no   Wear Glasses or Blind: yes    Cognitive  Cognitive/Neuro/Behavioral: WDL                      Living Environment:   People in home: spouse     Current living Arrangements: apartment      Able to return to prior arrangements: yes       Family/Social Support:  Care provided by: self  Provides care for: no one  Marital Status:   Support system: Wife, Children  Drea       Description of Support System: Supportive, Involved         Current Resources:   Patient receiving home care services: No        Community Resources:    Equipment currently used at home: none  Supplies currently used at home: None    Employment/Financial:  Employment Status: retired        Financial Concerns: unable to afford rent/mortgage   Referral to Financial Worker: No       Does the patient's insurance plan have a 3 day qualifying hospital stay waiver?  No    Lifestyle & Psychosocial Needs:  Social Drivers of Health     Food Insecurity: Low Risk  (3/8/2025)    Food Insecurity     Within the past 12 months, did you worry that your food would run out before you got money to buy more?: No     Within the past 12 months, did the food you bought just not last and you didn t have money to get more?: No   Depression: Not at risk (1/14/2025)    PHQ-2     PHQ-2 Score: 0   Housing Stability: High Risk (3/8/2025)    Housing Stability     Do you have housing? : No     Are you worried about losing your housing?: No   Tobacco Use: Medium Risk (3/6/2025)     Patient History     Smoking Tobacco Use: Former     Smokeless Tobacco Use: Never     Passive Exposure: Not on file   Financial Resource Strain: Low Risk  (3/8/2025)    Financial Resource Strain     Within the past 12 months, have you or your family members you live with been unable to get utilities (heat, electricity) when it was really needed?: No   Alcohol Use: Not At Risk (12/16/2019)    AUDIT-C     Frequency of Alcohol Consumption: Never     Average Number of Drinks: Not on file     Frequency of Binge Drinking: Not on file   Transportation Needs: Low Risk  (3/8/2025)    Transportation Needs     Within the past 12 months, has lack of transportation kept you from medical appointments, getting your medicines, non-medical meetings or appointments, work, or from getting things that you need?: No   Physical Activity: Not on file   Interpersonal Safety: Low Risk  (3/8/2025)    Interpersonal Safety     Do you feel physically and emotionally safe where you currently live?: Yes     Within the past 12 months, have you been hit, slapped, kicked or otherwise physically hurt by someone?: No     Within the past 12 months, have you been humiliated or emotionally abused in other ways by your partner or ex-partner?: No   Stress: Not on file   Social Connections: Not on file   Health Literacy: Not on file       Functional Status:  Prior to admission patient needed assistance:   Dependent ADLs:: Independent  Dependent IADLs:: Independent       Mental Health Status:  Mental Health Status: Current Concern       Chemical Dependency Status:  Chemical Dependency Status: No Current Concerns             Values/Beliefs:  Spiritual, Cultural Beliefs, Mu-ism Practices, Values that affect care:                 Discussed  Partnership in Safe Discharge Planning  document with patient/family: Yes:     Additional Information:  Met with patient at bedside to introduce self and role in discharge planning.  A   was used for this  consult.  Verified address, PCP, and discussed AHCD.    Loy lives in an apartment with his wife.  Prior to admission he was independent in ADL/IADL.  He was working but is now retired and has social security as his only income.      He is worried about being able to afford rent, food and utilities on his current income.  His rent is currently 1,000/month.  Writer provided resources for [facility] in English and some food resources in Sri [place]an.  When going over resources with patient, writer let him know that when he calls the numbers given, he should ask for a .  Both patient and spouse were very happy with list given.      Plan is to discharge home today. No further care management intervention anticipated at this time.  Please re-consult if further needs arise.  Care management signing off.       Next Steps: Does need IMM.    UPDATE:  IMM has been signed and faxed to HIM.    Desirae Sheriff,  Nurse Coordinator, BSN  Phone: [phone]  Vocera: 6B C RNCC     Social Work and Care Management Department      SEARCHABLE in [facility] - search CARE COORDINATOR      [employer] ([phone]) Saturday & Sunday; ([phone]) [facility] Recognized Holidays      Units: 5A Onc Vocera & 5C Vocera      Units: 6B Vocera & 6C Vocera       Units: 7A SOT RNCC Vocera, 7B Med Surg Vocera, & 7C Med Surg Vocera       Units: 6A Vocera & 4A CVICU Vocera, 4C MICU Vocera, and 4E SICU Vocera       Units: 5 Ortho Vocera & 5 Med Surg Vocera       Units: 6 Med Surg Vocera & 8 Med Surg Vocera       Cristina Sheriff RN

## 2025-03-10 NOTE — PHARMACY-ADMISSION MEDICATION HISTORY
Pharmacist Admission Medication History    Admission medication history is complete. The information provided in this note is only as accurate as the sources available at the time of the update.    Information Source(s): Patient and CareEverywhere/SureScripts via in-person    Pertinent Information: Updated medication list and last doses per patient's testimony using a . The patient had a hard time remembering or recognizing medications.     Confirmed that he is still taking Apixaban, even though the fill history doesn't show any recent fills. Tried calling [email or web address], but no one picked up to confirm Apixaban's last fill.    Changes made to PTA medication list:  Added: None  Deleted: Albuterol inhaler, flonase, guaifenesin, omeprazole, oxycodone, vitamin D  Changed: None    Allergies reviewed with patient and updates made in EHR: yes    Medication History Completed By: Edmund Koenig RPH 3/10/2025 12:57 PM    PTA Med List   Medication Sig Last Dose/Taking          apixaban (Eliquis) 5 mg tablet Take 1 tablet (5 mg) by mouth 2 times daily. 3/7/2025 Morning    atorvastatin (LIPITOR) 40 MG tablet Take 1 tablet (40 mg) by mouth daily. 3/7/2025 Morning    diltiazem ER COATED BEADS (CARDIZEM CD/CARTIA XT) 240 MG 24 hr capsule Take 1 capsule (240 mg) by mouth daily. 3/7/2025 Morning    hydrALAZINE (APRESOLINE) 25 MG tablet Take 1 tablet (25 mg) by mouth 2 times daily. 3/7/2025 Morning    insulin glargine (LANTUS SOLOSTAR) 100 UNIT/ML pen Inject 20 Units Subcutaneous at bedtime 3/6/2025 Bedtime    levothyroxine (SYNTHROID/LEVOTHROID) 150 MCG tablet Take 1 tablet (150 mcg) by mouth daily. 3/7/2025 Morning    metFORMIN (GLUCOPHAGE XR) 500 MG 24 hr tablet Take 2 tablets (1,000 mg) by mouth 2 times daily (with meals). 3/7/2025 Morning          mycophenolate (GENERIC EQUIVALENT) 500 MG tablet Take 1 tablet (500 mg) by mouth 2 times daily 3/7/2025 Morning    predniSONE (DELTASONE) 5 MG tablet Take 1 tablet (5  mg) by mouth daily 3/7/2025 Morning    ursodiol (ACTIGALL) 250 MG tablet Take 1 tablet (250 mg) by mouth 2 times daily 3/7/2025 Morning

## 2025-03-10 NOTE — PLAN OF CARE
Occupational Therapy Discharge Summary    Reason for therapy discharge:    Discharged to home.    Progress towards therapy goal(s). See goals on Care Plan in [facility] electronic health record for goal details.  Goals partially met.  Barriers to achieving goals:   discharge from facility.    Therapy recommendation(s):    No further therapy is recommended.

## 2025-03-11 ENCOUNTER — PATIENT OUTREACH (OUTPATIENT)
Dept: CARE COORDINATION | Facility: CLINIC | Age: 70
End: 2025-03-11
Payer: COMMERCIAL

## 2025-03-11 DIAGNOSIS — Z09 HOSPITAL DISCHARGE FOLLOW-UP: ICD-10-CM

## 2025-03-11 NOTE — PROGRESS NOTES
Clinic Care Coordination Contact    Situation: Patient chart reviewed by care coordinator.    Background: Patient identified via recently discharged list.     Assessment: Per chart review, pt had call with Cardiology RN for TCM yesterday.   Per chart review from inpatient: Loy lives in an apartment with his wife.  Prior to admission he was independent in ADL/IADL.  He was working but is now retired and has social security as his only income.       He is worried about being able to afford rent, food and utilities on his current income.  His rent is currently 1,000/month.  Writer provided resources for [facility] in English and some food resources in Fijian.  When going over resources with patient, writer let him know that when he calls the numbers given, he should ask for a .  Both patient and spouse were very happy with list given.      Plan/Recommendations: RN will not complete TCM call, but will continue to offer CC. RN will assign to CHW.     ADIA DICKSON RN on 3/11/2025 at 8:10 AM

## 2025-03-11 NOTE — LETTER
PRIMARY CARE CARE COORDINATION   [facility] [place]  [address] [place] [address]    March 11, 2025    Loy Limon  [address]  [facility] [address]      Dear Loy,        I am a community health worker who works with Jaswinder Anne MD with the Primary Care clinic at the [facility] I wanted to thank you for spending the time to talk with me.  Below is a description of clinic care coordination and how I can further assist you.       The clinic care coordination team is made up of a registered nurse, , and community health worker who understand the health care system. The goal of clinic care coordination is to help you manage your health and improve access to the health care system. Our team works alongside your provider to assist you in determining your health and social needs. We can help you obtain health care and community resources, providing you with necessary information and education. We can work with you through any barriers and develop a care plan that helps coordinate and strengthen the communication between you and your care team. Our services are voluntary and are offered without charge to you personally.    Please feel free to contact me with any questions or concerns regarding care coordination and what we can offer.      We are focused on providing you with the highest-quality healthcare experience possible.    Sincerely,     Esha BRADLEY Community Health Worker  Clinic Care Coordination  [facility]   Clinic #: [phone]  Direct #: [phone]

## 2025-03-11 NOTE — PROGRESS NOTES
"Clinic Care Coordination Contact  Community Health Worker Initial Outreach       Patient accepts CC: No, resources given, no ongoing needs. Patient will be sent Care Coordination introduction letter for future reference.    CHW spoke with patient and patient is doing well since being discharged from the hospital. Patient is in need of rental, energy and food support and wanted these items mailed to him so that his daughter can help him go through the resources and apply. CHW gathered resources and printed them off to be mailed to patient in english and in Comoran per requested from patient. CHW let patient know that if he has any questions or concerns to give her a call back. There are no additional needs or concerns at this time.    Mail sent to patient:    \"Rental/ Energy/ Food Resources ([facility])  Rental Assistance:    Application website: [facility]/rental-assistance/    Emergency Rental Assistance from [facility] ([facility]) can help you and your family stay in safe and stable housing when you experience a financial crisis.    Eligibility  You're eligible for Emergency Rental Assistance if you meet the following requirements:  You live in [facility].  You do not receive a rental subsidy like MPHA or Section 8.  You have household income that is at or below 200% of the Federal Poverty Level Income Guidelines -- see table.  Have a household of 9 or more people? Call [phone] for income eligibility information.    To get Emergency Rental Assistance, you need to complete an application and submit all required documentation to determine if you're eligible.    How to submit your application:    Mail:  [facility] Rental Assistance Program  [address], [address]  [place] [address]    Email:  [email or web address]    In person:  Visit one of [facility]'s offices to drop off your application and documentation.  We will review your application " materials and follow up with you for next steps.     Please note: We process applications in the order that we receive them. Due to high demand, it may take up to 60 days to process your application.     Still have questions or want to have an application mailed to you? Please email us at [email or web address].    Energy Assistance through utilities:    Below is the information regarding how to apply to energy assistance. Please let me know if you have any questions or concerns.    Application website: Pixc/energy-assistance/    The Energy Assistance Program helps pay energy bills for eligible [place]  The program is free and provides an average benefit of $500, plus additional support to respond to emergencies.    Who qualifies?  Both renters and homeowners can qualify. Eligibility is based on income and household size. For example, a family of four could earn up to $68,845 annually and qualify to receive financial assistance with energy bills.    APPLY ONLINE    Home   Energy Assistance Program    How does the program work?  Payments for energy bills are sent directly to the household's energy company or to a provider of fuel like propane, fuel oil or wood. Initial benefits average $550 per household and can be up to $1,400, depending on household income and fuel costs.    [place]'s Energy Assistance Program is federally funded and administered by the Department of [place], which works with local service providers throughout the [facility].    The deadline to apply for Energy Assistance during the winter of 8131-8419 is May 31, 2025.    This website is supported by Ajay Number 93.568 from the Low Income Home Energy Assistance Program within the Administration for Children and Families, a division of the U.S. Department of Health and Human Services. Neither the Administration for Children and Families nor any of its components operate, control, are responsible for, or necessarily endorse  this website (including, without limitation, its content, technical infrastructure, and policies, and any services or tools provided). The opinions, findings, conclusions, and recommendations expressed are those of the author(s) and do not necessarily reflect the views of the Administration for Children and Families and the Low Income Home Energy Assistance Program.       Food Resources:     MarketRx:    Application Website: [email or web address]    If you are unable to get the application, Call  [employer]  at [phone]  and request to complete the application to receive the $80 food voucher through [employer] since you are a [place] patient.    What is it?:  MarketRx is a program offered by [facility] [place] that provides patients with $80 per month to buy groceries from their local partner organization, [employer]. [employer] Group operates the [place] MobPanel and Fare For All sites.    [facility] is a weekly food bus that travels throughout the [place] functioning as a small scale mobile grocery store. Participants can choose from a full selection of fresh fruits, vegetables, meat, dairy, grains and other high-quality groceries.    Fare For All is a once monthly food sale focusing on the sale of food packs. Participants can buy fresh fruit, vegetables and quality frozen meats at FFA locations.    How does it work?:  Groceries can be purchased from the [employer] or Fare for All    No payment is needed when getting groceries. Tell the  you are part of the [place] MarketRx Program and give your name or the name of the minor (pediatric patient) enrolled to use the credit    Fare For All: Participants may spend up to all $80 dollars in one visit at [facility] For All sites.    [place] MobPanel: Participants will have a $20 spending limit per visit at [place] MobPanel sites. This limit helps to ensure that the food bus stays well stocked for all who  "visit the bus throughout the day.    Funds do not roll over from one month to the next. Each month, participants will start with a balance of $80 on file.    How do I find sale locations that work for me?:  Visit [employer]'s website.   [email or web address]/    Click on the \"Find Groceries\" tab along the top righthand side of the webpage and enter your address to search for convenient locations, dates and times.    Kyrgyz  [place]/ Energía/ Recursos Alimentarios ([place])  Asistencia para el alquiler:    Sitio web de la solicitud: [facility] Mobility/rental-assistance/    La Asistencia de Emergencia para el [place] de la Asociación de Acción Comunitaria del [place] (CAP-HC) puede ayudarlo a usted y a samaniego theodora a permanecer en dyana vivienda medrano y estable cuando experimente dyana crisis financiera.    Elegibilidad  Eres elegible para la Asistencia de Emergencia para el Alquiler si cumples con los siguientes requisitos:  1. Vive en el condado de [place].  2. No recibe un subsidio de alquiler munira MPHA o Sección [address].  3. Tiene ingresos familiares que son iguales o inferiores al 200% de las [place] Federales de Ingresos del [place] (consulte la tabla).   Tiene un hogar de 9 o más personas? Llame al [phone] para obtener información sobre la elegibilidad de ingresos.    Para obtener Asistencia de Emergencia para el Alquiler, debe completar dyana solicitud y enviar toda la documentación requerida para determinar si es elegible.  Cómo presentar samaniego solicitud:    Correo:  Programa de Asistencia para el Alquiler de CAP-  [address], [address]  [place] [address]    Correo electrónico:  [email or web address]    En persona:  Visite dyana de las oficinas de [facility] para dejar samaniego solicitud y documentación.  Revisaremos los materiales de samaniego solicitud y haremos un seguimiento con usted para conocer los próximos pasos.    Tenga en cuenta: Procesamos las " solicitudes en el orden en que las recibimos. Debido a la hina demanda, puede tardar hasta 60 días en procesar samaniego solicitud.     Todavía tiene preguntas o quiere que le envíen dyana solicitud por correo? Por favor, envíenos un correo electrónico a [email or web address].    Asistencia energética a través de servicios públicos:    A continuación se muestra la información sobre cómo solicitar la asistencia energética. Por favor, hágamelo saber si tiene alguna pregunta o inquietud.    Sitio web de la aplicación: Beestar/energy-assistance/    El Programa de Asistencia Energética ayuda a pagar las facturas de energía de los residentes de [place] elegibles  El programa es gratuito y proporciona un beneficio promedio de $500, además de apoyo adicional para responder a emergencias.     Quién califica?  Tanto los inquilinos munira los propietarios de viviendas pueden calificar. La elegibilidad se basa en los ingresos y el tamaño del hogar. Por ejemplo, dyana theodora de cuatro miembros podría ganar hasta $68,845 al año y calificar para recibir asistencia financiera con las facturas de energía.    APLICAR EN LÍNEA    Inicio   Programa de Asistencia Energética     Cómo funciona el programa?  Los pagos de las facturas de energía se envían directamente a la compañía de energía del hogar o a un proveedor de combustible munira propano, combustóleo o marion. Los beneficios iniciales promedian $550 por hogar y pueden ser de hasta $1,400, dependiendo de los ingresos del [facility] y los costos de combustible.    El Programa de Asistencia Energética de [place] es financiado y administrado por el Departamento de Comercio, que trabaja con proveedores de servicios locales en todo el estado.    La fecha límite para solicitar la Asistencia Energética nancy el invierno de [phone] es el 31 de vu de 2025.    Maryjo sitio web cuenta con el apoyo de la Subvención Número 93.568 del Programa de Asistencia Energética para Hogares de  "[place]ajos Ingresos de la Administración para Niños y Familias, dyana división del Departamento de [place] y Servicios Humanos de . U. Ni la Administración para Niños y Familias ni ninguno de nikhil componentes operan, controlan, son responsables o necesariamente respaldan jaimie sitio web (incluidos, entre otros, samaniego contenido, infraestructura técnica y políticas, y cualquier servicio o herramienta proporcionada). Las opiniones, hallazgos, conclusiones y recomendaciones expresadas son las del autor o autores y no reflejan necesariamente los puntos de vista de la Administración para Niños y Familias y el Programa de Asistencia de Energía para Hogares de Bajos Ingresos.    Recursos Alimenticios:    MarketRx:    Sitio web de la aplicación: surveynet.Can[place] do Credito.Dang Le/Roam & Wander/Mozy    Si no puede obtener la solicitud, llame a \"[employer] Group\" al [phone] y solicite completar la solicitud para recibir el cupón de alimentos de $80 a través de [employer], ya que es un paciente de Exotel.     Qué es?:  MarketRx es un programa ofrecido por [employer] que proporciona a los pacientes $80 por mes para comprar comestibles de samaniego organización asociada local, The Food Group. The Food Group opera los sitios de [facility] Mobile Market y Fare For All.    TCMM es un autobús de comida semanal que viaja a través de las Ciudades Gemelas funcionando munira dyana tyler de comestibles móvil a pequeña escala. Los participantes pueden elegir entre dayna selección completa de frutas frescas, verduras, carne, lácteos, granos y otros comestibles de hina calidad.    Fare For All es dyana venta de alimentos dyana vez al mes que se centra en la venta de paquetes de alimentos. Los participantes pueden comprar frutas frescas, verduras y ji congeladas de calidad en las ubicaciones de FFA.  Cómo funciona?: Los comestibles se pueden comprar en el Sanchez Móvil de las Ciudades Gemelas o en Fare for All No es necesario pagar para obtener comestibles. Dígale al " "cajero que es parte del Programa EarDishRx y dé samaniego nombre o el nombre del shashank (paciente pediátrico) inscrito para usar el crédito Tarifa para todos: Los participantes pueden gastar hasta $80 dólares en dyana obie visita en los sitios de Tarifa para todos. Sanchez Móvil de las Ciudades Gemelas: Los participantes tendrán un límite de gasto de $20 por visita en los sitios del Sanchez Móvil de las Ciudades Gemelas. Maryjo límite ayuda a garantizar que el autobús de comida se mantenga armaan abastecido para todos los que visitan el autobús nancy todo el día. Los fondos no se transfieren de un mes a otro. Cada mes, los participantes comenzarán con un saldo de $80 en el archivo.     Cómo puedo encontrar puntos de venta que me convengan?:  Visite el sitio web de [employer] Group.  [email or web address] Motionodgroupmn.org/    Samara dev en la pestaña \"Buscar comestibles\" en la parte superior derecha de la página web e ingrese samaniego dirección para buscar ubicaciones, fechas y horas convenientes.\"         Chief Complaint   Patient presents with    Clinic Care Coordination - Initial       Most Recent Admission Date: 3/7/2025   Most Recent Admission Diagnosis: NSTEMI (non-ST elevated myocardial infarction) (H) - I21.4     Most Recent Discharge Date: 3/10/2025   Most Recent Discharge Diagnosis: NSTEMI (non-ST elevated myocardial infarction) (H) - I21.4  Back pain, unspecified back location, unspecified back pain laterality, unspecified chronicity - M54.9  Anticoagulated - Z79.01  Chronic atrial fibrillation (H) - I48.20     Transitions of Care Assessment    Discharge Assessment  How are you doing now that you are home?: \"I am doing fine anx i am taking my medicationa and i hope it continues that way\"  How are your symptoms? (Red Flag symptoms escalate to triage hotline per guidelines): Improved  Do you know how to contact your clinic care team if you have future questions or changes to your health status? : Yes  Does the patient have their " discharge instructions? : Unknown  Does the patient have questions regarding their discharge instructions? : No (patient doesnt know where they are)  Were you started on any new medications or were there changes to any of your previous medications? : Yes  Does the patient have all of their medications?: Yes  Do you have questions regarding any of your medications? : No  Do you have all of your needed medical supplies or equipment (DME)?  (i.e. oxygen tank, CPAP, cane, etc.): Yes          [facility] Explained and offered Care Coordination support to eligible patients: Yes    Patient accepted? Yes    Follow up Plan     Discharge Follow-Up  Discharge follow up appointment scheduled in alignment with recommended follow up timeframe or Transitions of Risk Category? (Low = within 30 days; Moderate= within 14 days; High= within 7 days): Yes  Discharge Follow Up Appointment Date: 03/27/25  Discharge Follow Up Appointment Scheduled with?: Primary Care Provider    Future Appointments   Date Time Provider Department Center   3/13/2025 10:45 AM [address]   3/27/2025  3:30 PM Jaswinder Anne MD [facility]   6/10/2025  8:30 AM Leia Edward PA-C [facility]   1/13/2026  9:30 AM Izabela Galvan MD [facility]       Outpatient Plan as outlined on AVS reviewed with patient.      For any urgent concerns, please contact our 24 hour nurse triage line: [phone]       THANG Clifford Community Health Worker  Clinic Care Coordination  [facility]   Clinic #: [phone]  Direct #: [phone]

## 2025-03-12 ENCOUNTER — TELEPHONE (OUTPATIENT)
Dept: CARDIOLOGY | Facility: CLINIC | Age: 70
End: 2025-03-12
Payer: COMMERCIAL

## 2025-03-12 DIAGNOSIS — Z94.4 LIVER TRANSPLANTED (H): ICD-10-CM

## 2025-03-12 RX ORDER — PREDNISONE 5 MG/1
5 TABLET ORAL DAILY
Qty: 90 TABLET | Refills: 1 | Status: SHIPPED | OUTPATIENT
Start: 2025-03-12

## 2025-03-12 NOTE — TELEPHONE ENCOUNTER
Patient Contacted for the patient to call back and schedule the following:    Appointment type: RTN EP  Provider: NAYANA  Return date: 6/17/2025  Specialty phone number: [phone] OPT 1  Additional appointment(s) needed: ECG PRIOR  Additonal Notes: 6 MONTH FOLLOW-UP WITH NAYANA

## 2025-03-12 NOTE — TELEPHONE ENCOUNTER
Health Call Center    Phone Message    May a detailed message be left on voicemail: yes     Reason for Call: Medication Refill Request    Has the patient contacted the pharmacy for the refill? Yes   Name of medication being requested: predniSONE (DELTASONE) 5 MG tablet   Provider who prescribed the medication: unknown  Pharmacy: [facility] ([facility]) #[id number] - [place], [place] - [address]   Date medication is needed: as soon as able        Action Taken: Message routed to:  [facility] ([facility]    Travel Screening: Not Applicable     Date of Service:

## 2025-03-13 ENCOUNTER — HOSPITAL ENCOUNTER (OUTPATIENT)
Dept: MRI IMAGING | Facility: CLINIC | Age: 70
Discharge: HOME | End: 2025-03-13
Payer: COMMERCIAL

## 2025-03-13 DIAGNOSIS — I21.4 NSTEMI (NON-ST ELEVATED MYOCARDIAL INFARCTION) (H): ICD-10-CM

## 2025-03-13 PROCEDURE — 75561 CARDIAC MRI FOR MORPH W/DYE: CPT

## 2025-03-13 PROCEDURE — 75561 CARDIAC MRI FOR MORPH W/DYE: CPT | Mod: 26 | Performed by: INTERNAL MEDICINE

## 2025-03-13 PROCEDURE — T1013 SIGN LANG/ORAL INTERPRETER: HCPCS

## 2025-03-13 PROCEDURE — A9585 GADOBUTROL INJECTION: HCPCS

## 2025-03-13 PROCEDURE — 255N000002 HC RX 255 OP 636

## 2025-03-13 RX ORDER — GADOBUTROL 604.72 MG/ML
10 INJECTION INTRAVENOUS ONCE
Status: COMPLETED | OUTPATIENT
Start: 2025-03-13 | End: 2025-03-13

## 2025-03-13 RX ADMIN — GADOBUTROL 10 ML: 604.72 INJECTION INTRAVENOUS at 12:09

## 2025-03-19 ENCOUNTER — DOCUMENTATION ONLY (OUTPATIENT)
Dept: FAMILY MEDICINE | Facility: CLINIC | Age: 70
End: 2025-03-19
Payer: COMMERCIAL

## 2025-03-19 LAB
ATRIAL RATE - MUSE: NORMAL BPM
ATRIAL RATE - MUSE: NORMAL BPM
DIASTOLIC BLOOD PRESSURE - MUSE: NORMAL MMHG
DIASTOLIC BLOOD PRESSURE - MUSE: NORMAL MMHG
INTERPRETATION ECG - MUSE: NORMAL
INTERPRETATION ECG - MUSE: NORMAL
P AXIS - MUSE: NORMAL DEGREES
P AXIS - MUSE: NORMAL DEGREES
PR INTERVAL - MUSE: NORMAL MS
PR INTERVAL - MUSE: NORMAL MS
QRS DURATION - MUSE: 102 MS
QRS DURATION - MUSE: 98 MS
QT - MUSE: 448 MS
QT - MUSE: 488 MS
QTC - MUSE: 465 MS
QTC - MUSE: 488 MS
R AXIS - MUSE: -25 DEGREES
R AXIS - MUSE: -26 DEGREES
SYSTOLIC BLOOD PRESSURE - MUSE: NORMAL MMHG
SYSTOLIC BLOOD PRESSURE - MUSE: NORMAL MMHG
T AXIS - MUSE: 124 DEGREES
T AXIS - MUSE: 138 DEGREES
VENTRICULAR RATE- MUSE: 60 BPM
VENTRICULAR RATE- MUSE: 65 BPM

## 2025-03-19 NOTE — PROGRESS NOTES
Type of Form Received: [facility] [place] [facility] Form-Health Care Provider Certification    Form Received (Date) 3/17/25   Form Filled out No   Placed in provider folder Yes

## 2025-03-22 ENCOUNTER — HEALTH MAINTENANCE LETTER (OUTPATIENT)
Age: 70
End: 2025-03-22

## 2025-03-30 DIAGNOSIS — Z79.4 TYPE 2 DIABETES MELLITUS WITH OTHER SPECIFIED COMPLICATION, WITH LONG-TERM CURRENT USE OF INSULIN (H): ICD-10-CM

## 2025-03-30 DIAGNOSIS — E11.69 TYPE 2 DIABETES MELLITUS WITH OTHER SPECIFIED COMPLICATION, WITH LONG-TERM CURRENT USE OF INSULIN (H): ICD-10-CM

## 2025-03-31 NOTE — TELEPHONE ENCOUNTER
Last Written Prescription:  insulin glargine (LANTUS SOLOSTAR) 100 UNIT/ML pen 15 mL 1 4/17/2024     ----------------------  Last Visit Date: 3/6/25  Future Visit Date: none  ----------------------    Refill decision: Medication unable to be refilled by RN due to: Medication not included in refill protocol policy     Insulin and insulin pump supplies - refilled per  clinic.  Meron Marin RN  [facility]/Red Flag Triage & Med Refill Team       Request from pharmacy:  Requested Prescriptions   Pending Prescriptions Disp Refills    insulin glargine (LANTUS SOLOSTAR) 100 UNIT/ML pen 15 mL 1     Sig: Inject 20 Units subcutaneously at bedtime.       Insulin Protocol Failed - 3/31/2025  8:50 AM        Failed - Chart review required     Review Chart.    Do not approve if insulin is used in a pump.  Instead, direct refill request to the patient's endocrinologist.  If the patient doesn't have an endocrinologist, then send the refill to the patient's PCP for review            Passed - HgbA1C in past 3 or 6 months     If HgbA1C is 8 or greater, it needs to be on file within the past 3 months.  If less than 8, must be on file within the past 6 months.     Recent Labs   Lab Test 02/18/25  0810 02/06/24  1221 10/13/23  1137   A1C 8.4*   < >  --    HEMOGLOBINA1  --   --  8.1    < > = values in this interval not displayed.             Passed - Medication is active on med list and the sig matches. RN to manually verify dose and sig if red X/fail.     If the protocol passes (green check), you do not need to verify med dose and sig.    A prescription matches if they are the same clinical intention.    For Example: once daily and every morning are the same.    The protocol can not identify upper and lower case letters as matching and will fail.     For Example: Take 1 tablet (50 mg) by mouth daily     TAKE 1 TABLET (50 MG) BY MOUTH DAILY    For all fails (red x), verify dose and sig.    If the refill does match what is on  file, the RN can still proceed to approve the refill request.       If they do not match, route to the appropriate provider.             Passed - Recent (6 mo) or future (90 days) visit within the authorizing provider's specialty     The patient must have completed an in-person or virtual visit within the past 6 months or has a future visit scheduled within the next 90 days with the authorizing provider s specialty.  Urgent care and e-visits do not quality as an office visit for this protocol.          Passed - Medication indicated for associated diagnosis     Medication is associated with one or more of the following diagnoses:   - Type 1 diabetes mellitus  - Type 2 diabetes mellitus  - Diabetic nephropathy; Prophylaxis  - Neuropathy due to diabetes mellitus; Prophylaxis  - Retinopathy due to diabetes mellitus; Prophylaxis  - Diabetes mellitus associated with cystic fibrosis  - Disorder of cardiovascular system; Prophylaxis - Type 1 diabetes mellitus   - Disorder of cardiovascular system; Prophylaxis - Type 2 diabetes mellitus            Passed - Patient is 18 years of age or older

## 2025-04-01 RX ORDER — INSULIN GLARGINE 100 [IU]/ML
20 INJECTION, SOLUTION SUBCUTANEOUS AT BEDTIME
Qty: 15 ML | Refills: 1 | Status: SHIPPED | OUTPATIENT
Start: 2025-04-01

## 2025-04-09 NOTE — PLAN OF CARE
VS: VSS, afebrile, on RA  Mental Status: A&Ox4; Macedonian speaking baseline, proficient in english   Cardiac: bradycardiac intermittent; denies chest pain   Respiratory: LS clear, diminished, bilaterally, denies SOB  GI/: voiding adequate amount urine; abdomen distended, rounded; + bowel tones; x1 small BM this shift; liver transplant incision and inguinal incision approximated with staples/scant drainage present; KARINE patent to yellow/urine appearing fluid  Pain: + abdominal pain; bilateral lower extremity pain secondary to fluid overload; PRN oxycodone given x1 w/ relief   Diet: Tolerating regular diet; , 108 - low BG this AM 57; 42 g carbs given - rechecked 92  Mobility: Up with SBA/independent with ambulation in hallway; calling appropriately   Treatment: continue post transplant care; lymphedema wrapped/bilateral lower extremity/wraps removed/falling off patient legs and scrotal edema   DC plan: plan to discharge home with HHPT; per PT evaluation      No

## 2025-04-29 ENCOUNTER — TRANSFERRED RECORDS (OUTPATIENT)
Dept: HEALTH INFORMATION MANAGEMENT | Facility: CLINIC | Age: 70
End: 2025-04-29
Payer: COMMERCIAL

## 2025-05-01 ENCOUNTER — OFFICE VISIT (OUTPATIENT)
Dept: PHARMACY | Facility: CLINIC | Age: 70
End: 2025-05-01
Payer: COMMERCIAL

## 2025-05-01 VITALS — SYSTOLIC BLOOD PRESSURE: 130 MMHG | DIASTOLIC BLOOD PRESSURE: 81 MMHG | OXYGEN SATURATION: 97 % | HEART RATE: 85 BPM

## 2025-05-01 DIAGNOSIS — Z79.4 TYPE 2 DIABETES MELLITUS WITH OTHER SPECIFIED COMPLICATION, WITH LONG-TERM CURRENT USE OF INSULIN (H): Primary | ICD-10-CM

## 2025-05-01 DIAGNOSIS — E11.69 TYPE 2 DIABETES MELLITUS WITH OTHER SPECIFIED COMPLICATION, WITH LONG-TERM CURRENT USE OF INSULIN (H): Primary | ICD-10-CM

## 2025-05-01 DIAGNOSIS — N18.2 CHRONIC KIDNEY DISEASE, STAGE 2 (MILD): ICD-10-CM

## 2025-05-01 DIAGNOSIS — I10 PRIMARY HYPERTENSION: ICD-10-CM

## 2025-05-01 DIAGNOSIS — I48.91 ATRIAL FIBRILLATION WITH RAPID VENTRICULAR RESPONSE (H): ICD-10-CM

## 2025-05-01 DIAGNOSIS — Z78.9 TAKES DIETARY SUPPLEMENTS: ICD-10-CM

## 2025-05-01 DIAGNOSIS — E03.9 HYPOTHYROIDISM, UNSPECIFIED TYPE: ICD-10-CM

## 2025-05-01 DIAGNOSIS — I25.118 CORONARY ARTERY DISEASE OF NATIVE ARTERY OF NATIVE HEART WITH STABLE ANGINA PECTORIS: ICD-10-CM

## 2025-05-01 DIAGNOSIS — Z94.4 LIVER TRANSPLANT RECIPIENT (H): ICD-10-CM

## 2025-05-01 DIAGNOSIS — I21.4 NSTEMI (NON-ST ELEVATED MYOCARDIAL INFARCTION) (H): ICD-10-CM

## 2025-05-01 PROCEDURE — 3079F DIAST BP 80-89 MM HG: CPT

## 2025-05-01 PROCEDURE — 3075F SYST BP GE 130 - 139MM HG: CPT

## 2025-05-01 PROCEDURE — 99605 MTMS BY PHARM NP 15 MIN: CPT

## 2025-05-01 PROCEDURE — 99607 MTMS BY PHARM ADDL 15 MIN: CPT

## 2025-05-01 RX ORDER — VITAMIN B COMPLEX
2000 TABLET ORAL DAILY
COMMUNITY

## 2025-05-01 NOTE — LETTER
_  Medication List        Prepared on: May 1, 2025     Bring your Medication List when you go to the doctor, hospital, or   emergency room. And, share it with your family or caregivers.     Note any changes to how you take your medications.  Cross out medications when you no longer use them.    Medication How I take it Why I use it Prescriber   apixaban ANTICOAGULANT (ELIQUIS) 5 MG tablet Take 1 tablet (5 mg) by mouth 2 times daily. Tachycardia EZRA Bradford CNP   aspirin (ASA) 81 MG chewable tablet Take 1 tablet (81 mg) by mouth daily. NSTEMI (Non-ST Elevated Myocardial Infarction) (H) EZRA Bradford CNP   atorvastatin (LIPITOR) 40 MG tablet Take 1 tablet (40 mg) by mouth daily. DM type 2, goal HbA1c 7%-8% (H) Leia Edward PA-C   diltiazem ER COATED BEADS (CARDIZEM CD/CARTIA XT) 120 MG 24 hr capsule Take 1 capsule (120 mg) by mouth daily. Chronic Atrial Fibrillation (H) EZRA Bradford CNP   hydrALAZINE (APRESOLINE) 25 MG tablet Take 1 tablet (25 mg) by mouth 2 times daily. Primary Hypertension Leia Edward PA-C   insulin glargine (LANTUS SOLOSTAR) 100 UNIT/ML pen Inject 20 Units subcutaneously at bedtime. Type 2 diabetes mellitus with other specified complication, with long-term current use of insulin (H) Jaswinder Anne MD   levothyroxine (SYNTHROID/LEVOTHROID) 150 MCG tablet Take 1 tablet (150 mcg) by mouth daily. Other specified hypothyroidism Leia Edward PA-C   metFORMIN (GLUCOPHAGE XR) 500 MG 24 hr tablet Take 2 tablets (1,000 mg) by mouth 2 times daily (with meals). Type 2 diabetes mellitus with other specified complication, with long-term current use of insulin (H) Sue Tavares MD   metoprolol succinate ER (TOPROL XL) 25 MG 24 hr tablet Take 0.5 tablets (12.5 mg) by mouth daily. NSTEMI (Non-ST Elevated Myocardial Infarction) (H) EZRA Bradford CNP   mycophenolate (GENERIC EQUIVALENT) 500 MG tablet Take 1 tablet (500 mg) by mouth 2 times daily  Liver Replaced by Transplant (H) Izabela Galvan MD   predniSONE (DELTASONE) 5 MG tablet Take 1 tablet (5 mg) by mouth daily. Liver Transplanted (H) Jaswinder Anne MD   Vitamin D3 (VITAMIN D, CHOLECALCIFEROL,) 25 mcg (1000 units) tablet Take 2,000 Units by mouth daily. Supplement Patient Reported         Add new medications, over-the-counter drugs, herbals, vitamins, or  minerals in the blank rows below.    Medication How I take it Why I use it Prescriber                                      Allergies:      No Known Allergies        Side effects I have had:      No Known Side Effects        Other Information:              My notes and questions:

## 2025-05-01 NOTE — PATIENT INSTRUCTIONS
"Recommendations from today's MTM visit:                                                    MTM (medication therapy management) is a service provided by a clinical pharmacist designed to help you get the most of out of your medicines.   Today we reviewed what your medicines are for, how to know if they are working, that your medicines are safe and how to make your medicine regimen as easy as possible.      If your evening blood sugar is less than 130 mg/dL, you may decrease Lantus to 10 or 15 units but please do not skip the dose.  I have asked your cardiologist about switching hydralazine to a different blood pressure medicine that would be better for your kidneys.  Stop ursodiol, as recommended by Dr. Galvan.  You are scheduled to see Dr. Anne on Tuesday, June 10th at 7:15 AM    Follow-up: in 3-6 months, or sooner if needed    It was great speaking with you today.  I value your experience and would be very thankful for your time in providing feedback in our clinic survey. In the next few days, you may receive an email or text message from [employer] with a link to a survey related to your  clinical pharmacist.\"     To schedule another MTM appointment, please call the clinic directly or you may call the MTM scheduling line at [phone] or toll-free at [phone].     My Clinical Pharmacist's contact information:                                                      Please feel free to contact me with any questions or concerns you have.      Chase Mendieta (Hailie), BrandenD, MPH  Medication Therapy Management Pharmacist   "

## 2025-05-01 NOTE — Clinical Note
Hi,  I scheduled [facility] for an in person visit with Dr. Anne on 6/10/25 at 7:30 AM. Could you please ensure he has a  for this visit?  Thank you!! Chase Mnedieta (Hailie), PharmD, MPH Medication Therapy Management Pharmacist

## 2025-05-01 NOTE — LETTER
"Recommended To-Do List      Prepared on: May 1, 2025       You can get the best results from your medications by completing the items on this \"To-Do List.\"      Bring your To-Do List when you go to your doctor. And, share it with your family or caregivers.    My To-Do List:  What we talked about: What I should do:   Diabetes  If your evening blood sugar is less than 130 mg/dL, you may decrease Lantus to 10 or 15 units but please do not skip the dose.          What we talked about: What I should do:   Blood Pressure    I have asked your cardiologist about switching hydralazine to a different blood pressure medicine that would be better for your kidneys.          What we talked about: What I should do:   Ursodiol  Stop ursodiol, as recommended by Dr. Galvan.                  "

## 2025-05-01 NOTE — LETTER
May 6, 2025  Loy Limon  [address]  [place] [address]    Dear Mr. Limon, [facility]     Thank you for talking with me on May 1, 2025 about your health and medications. As a follow-up to our conversation, I have included two documents:      Your Recommended To-Do List has steps you should take to get the best results from your medications.  Your Medication List will help you keep track of your medications and how to take them.    If you want to talk about these documents, please call Nereyda Mendieta RPH at phone: [phone], Monday-Friday 8-4:30pm.    I look forward to working with you and your doctors to make sure your medications work well for you.    Sincerely,  Nereyda Mendieta RPH  [facility] Pharmacist, [facility]

## 2025-05-01 NOTE — PROGRESS NOTES
"Medication Therapy Management (MTM) Encounter    ASSESSMENT:                            Medication Adherence/Access: See below for considerations.    1. Type 2 Diabetes - Follows with Endocrinology  Patient is not meeting A1c goal of <8%.  Unclear how often patient is skipping doses of Lantus due to blood sugar <120 mg/dL. Per 2/18/25 endo note \"if his evening blood sugar is less than 130 he agrees to take 10 or 15 units and let us know if he has any difficulties with low blood sugars\". Patient encouraged to decrease dose instead of skip all together.     2. Hypertension, Atrial Fibrillation, CAD s/p PCI (2021) and NSTEMI (3/2025) - Follows with Cardiology  Blood pressure today is at goal of <140/80 mmHg, however, patient may benefit from switching hydralazine to ACEi/ARB for CKD/proteinuria. He did experience hyperkalemia with losartan in 2023 (while on 100 mg). Potassium has been unstable recently, so will clarify with PCP and cardiologist if patient should try ACEi/ARB or continue with hydralazine.     3. Hypothyroidism  Stable, recent TSH within range.     4. Liver Transplant (2018)  Ursodiol is on med list and patient has been taking, however, hepatologist recommended stopping on 1/14/25. Confirmed with Dr. Galvan that it's still okay for patient to stop ursodiol.    5. Vitamin D Deficiency  Stable.     6. CKD (CrCl 51.9 mL/min, GFR 59 mL/min/1.73m2)  Doses of medications are appropriate based on current kidney function.  SGLT2i (Jardiance) stopped when patient was admitted with UTI in 3/2024, hasn't been restarted due to concerns for worsening urinary frequency.  Last visit with nephrology was 7/20/2023.    PLAN:                            If your evening blood sugar is less than 130 mg/dL, you may decrease Lantus to 10 or 15 units but please do not skip the dose.  I have asked your cardiologist about switching hydralazine to a different blood pressure medicine that would be better for your kidneys. Addendum: " "Per Dr. Plascencia \"My preference is to continue with hydralazine and just monitor Creat/proteinuria for a few more months\".  Stop ursodiol, as recommended by Dr. Galvan.  You are scheduled to see Dr. Anne on Tuesday, June 10th at 7:15 AM    Follow-up: in 3-6 months, or sooner if needed    SUBJECTIVE/OBJECTIVE:                          Loy Limon is a 69 year old male seen for a follow-up visit and transitions of care visit. Patient was discharged from [facility] on 3/10/25 for NSTEMI. A professional  was used for today's visit.    Reason for visit: Follow up, LUIS MANUEL    Allergies/ADRs: Reviewed in chart  Past Medical History: Reviewed in chart  Tobacco: He reports that he quit smoking about 7 years ago. His smoking use included cigarettes and cigars. He started smoking about 54 years ago. He has a 1.4 pack-year smoking history. He has never used smokeless tobacco.  Alcohol: none    Medication Adherence/Access: No issues reported. Patient reports his daughter helps manage his medications/set up his pillbox.    Diabetes (Steroid Induced) - Follows with Endocrinology  Metformin XR 1000 mg twice daily  Lantus 20 units at bedtime  Patient is not experiencing side effects. He states he's been giving Lantus every day, but he also states he doesn't give Lantus if blood sugar is 120 mg/dL because he feels shaky with blood sugar this low. Attempted to clarify how often he doesn't give Lantus, but did not get a direct answer.  Current diabetes symptoms: Polyuria  Diet/Exercise: Not discussed  Blood sugar monitoring: He's checking blood sugar every day. Fasting blood sugar is usually less than 150 mg/dL. He reports no blood sugar readings <70 mg/dL.    Medication Considerations:  Jardiance - stopped when he was admitted with UTI in 3/2024, hasn't been restarted due to concerns for worsening urinary frequency  Trulicity - tried in the past, did not like this medication     Eye exam in the last 12 months? " No  Foot exam: due    Hypertension , Atrial Fibrillation, CAD s/p PCI (2021) and NSTEMI (3/2025) - Follows with Cardiology  Hydralazine 25 mg twice daily  Eliquis 5 mg twice daily  Diltiazem  mg daily - dose decreased at discharge   Metoprolol XL 12.5 mg daily - started at discharge   Aspirin 81 mg daily - started at discharge   Atorvastatin 40 mg daily  Patient reports no current medication side effects  Patient does not self-monitor blood pressure.     Medication Considerations:  Losartan stopped due to hyperkalemia in 2023  Possible cough with lisinopril    BP Readings from Last 6 Encounters:   05/01/25 130/81   03/10/25 134/74   03/06/25 (!) 158/86   02/28/25 (!) 138/91   02/18/25 (!) 165/97   01/21/25 (!) 174/98     Pulse Readings from Last 3 Encounters:   05/01/25 85   03/10/25 63   03/06/25 92      Hypothyroidism   Levothyroxine 150 mcg daily  Takes consistently with regard to other medications and food.  Patient is having the following symptoms: None     Liver Transplant (2018)  Mycophenolate 500 mg twice daily  Prednisone 5 mg daily with food  Ursodiol 250 mg twice daily - on med list and patient has been taking  Taking prednisone in the afternoon, hasn't noticed an impact on sleep.  No reported issues at this time.     Vitamin D Deficiency  Vitamin D 2000 units daily  No reported issues at this time.     Today's Vitals: /81   Pulse 85   SpO2 97%   ----------------  Post Discharge Medication Reconciliation Status: discharge medications reconciled and changed, per note/orders.    I spent 60 minutes with this patient today. All changes were made via collaborative practice agreement with Jaswinder Anne MD.     A summary of these recommendations was given to the patient.    Chase Mendieta (Hailie), PharmD, MPH  Medication Therapy Management Pharmacist      Medication Therapy Recommendations  Diabetes mellitus, type 2 (H)   1 Current Medication: insulin glargine (LANTUS SOLOSTAR) 100 UNIT/ML  pen   Current Medication Sig: Inject 20 Units subcutaneously at bedtime.   Rationale: Patient prefers not to take - Adherence - Adherence   Recommendation: Provide Education   Status: Accepted - no CPA Needed   Identified Date: 5/1/2025 Completed Date: 5/1/2025         Essential hypertension   1 Current Medication: hydrALAZINE (APRESOLINE) 25 MG tablet   Current Medication Sig: Take 1 tablet (25 mg) by mouth 2 times daily.   Rationale: More effective medication available - Ineffective medication - Effectiveness   Recommendation: Change Medication - losartan 50 MG tablet   Status: Contact Provider - Awaiting Response   Identified Date: 5/1/2025         Liver transplant recipient (H)   1 Current Medication: ursodiol (ACTIGALL) 250 MG tablet (Discontinued)   Current Medication Sig: Take 1 tablet (250 mg) by mouth 2 times daily   Rationale: Does not understand instructions - Adherence - Adherence   Recommendation: Provide Adherence Intervention   Status: Accepted per Provider   Identified Date: 5/1/2025 Completed Date: 5/1/2025

## 2025-05-20 NOTE — PROGRESS NOTES
"Endocrinology and Diabetes Clinic Return Visit    Subjective:   Loy Limon was seen today for a follow up visit for post-transplant diabetes mellitus and hypothyroidism.  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    He has no past medical history of Basal cell carcinoma, Malignant melanoma (H), or Squamous cell carcinoma of skin, unspecified.    I last saw him in clinic in February and I noted elevated blood pressure reduced GFR increased urine microalbuminuria.  He had been again referred to nephrology by his primary care provider .  I advised:  [place] GLargine/ Lantus insulina DIARIO - 20 unidades, (o 10 - 15 cuando azucar en la tarde esta <130)  Mas Metformina 1000 mg dos veces al gavin    Para la presion:  Hydralazine 25 mg dos veces al gavin  Dlitiazem 240 mg dyana vez al gavin     Interval history:   Notes reviewed.  His GFR in March was decreased to 59 creatinine increased to 1.29  He saw the MTM pharmacist last month and they noted that he was skipping Lantus due to blood sugars less than 120.  They did recommend potentially switching from hydralazine to an ACE ARB for CKD with proteinuria though noted he did have hyperkalemia on losartan 100 mg in 2023 so they plan to consult with PCP and cardiology regarding this.  Per Dr. Plascencia \"My preference is to continue with hydralazine and just monitor Creat/proteinuria for a few more months\".     Noted that Jardiance was held when admitted with UTI in March 2024 and has not been restarted due to concerns for urinary frequency.  I am also " concerned about history of UTI while on Jardiance.      Pharmacy reiterated that if evening blood sugars less than 130 that he could decrease his dose of Lantus to 10 or 15 units but not to hold the dose.     His recent A1c was 8.4 in February, at the time of his last visit.      Post transplant diabetes mellitus  He has history of cirrhosis with HCC and underwent living donor liver transplant on 12/22/2018. He developed steroid/immunosuppressant induced diabetes and was treated with NPH insulin, which was discontinued once he was no longer taking steroids.    Today:        Current treatment strategy:   Glargine 20 units at bedtime - not taking regularly (for example, if he is feeling good or if blood glucose is at goal, he would skip -though we note that he is only checked his blood sugar once in the evening in the last week.  He is surprised by this.  He notes he takes his long-acting Lantus generally about 3 times per week.    Metformin 1000 mg twice daily     Again for blood pressure it appears he should have some diltiazem at home but his hydralazine should be gone if he was taking it daily.     Prednisone dose is 5 mg daily     Blood glucose monitoring:   He checks finger stick blood glucose. However he ran out of test strips about 2 months ago so has not been checking.     Diabetes care and complications:  Retinopathy: None. Due for eye exam.   Nephropathy: CKD with +urine albumin.   Neuropathy: Yes, peripheral neuropathy with numbness/paraesthesia balls of feet   BP: Borderline control   Lipids: Statin prescribed     Medications:   Current Outpatient Medications   Medication Sig Dispense Refill    apixaban ANTICOAGULANT (ELIQUIS) 5 MG tablet Take 1 tablet (5 mg) by mouth 2 times daily. 180 tablet 3    aspirin (ASA) 81 MG chewable tablet Take 1 tablet (81 mg) by mouth daily. 90 tablet 3    atorvastatin (LIPITOR) 40 MG tablet Take 1 tablet (40 mg) by mouth daily. 90 tablet 2    diltiazem ER COATED BEADS  (CARDIZEM CD/CARTIA XT) 120 MG 24 hr capsule Take 1 capsule (120 mg) by mouth daily. 90 capsule 3    hydrALAZINE (APRESOLINE) 25 MG tablet Take 1 tablet (25 mg) by mouth 2 times daily. 180 tablet 3    insulin glargine (LANTUS SOLOSTAR) 100 UNIT/ML pen Inject 20 Units subcutaneously at bedtime. 15 mL 1    levothyroxine (SYNTHROID/LEVOTHROID) 150 MCG tablet Take 1 tablet (150 mcg) by mouth daily. 90 tablet 3    metFORMIN (GLUCOPHAGE XR) 500 MG 24 hr tablet Take 2 tablets (1,000 mg) by mouth 2 times daily (with meals). 360 tablet 3    metoprolol succinate ER (TOPROL XL) 25 MG 24 hr tablet Take 0.5 tablets (12.5 mg) by mouth daily. 45 tablet 3    mycophenolate (GENERIC EQUIVALENT) 500 MG tablet Take 1 tablet (500 mg) by mouth 2 times daily 180 tablet 3    predniSONE (DELTASONE) 5 MG tablet Take 1 tablet (5 mg) by mouth daily. 90 tablet 1    Vitamin D3 (VITAMIN D, CHOLECALCIFEROL,) 25 mcg (1000 units) tablet Take 2,000 Units by mouth daily. (Patient not taking: Reported on 6/3/2025)         Social History     Tobacco Use    Smoking status: Former     Current packs/day: 0.00     Average packs/day: (1.4 ttl pk-yrs)     Types: Cigarettes, Cigars     Start date: 1970     Quit date: 3/14/2018     Years since quittin.2    Smokeless tobacco: Never    Tobacco comments:     Quit smoking 2018, one cigarette after dinner   Substance Use Topics    Alcohol use: No     Comment: Quit drinking 2018       Physical Examination:  Wt Readings from Last 4 Encounters:   25 87.5 kg (193 lb)   03/10/25 86 kg (189 lb 9.5 oz)   25 87.3 kg (192 lb 6.4 oz)   25 89.8 kg (198 lb)     Wt Readings from Last 10 Encounters:   25 87.5 kg (193 lb)   03/10/25 86 kg (189 lb 9.5 oz)   25 87.3 kg (192 lb 6.4 oz)   25 89.8 kg (198 lb)   25 89.8 kg (198 lb)   25 89.8 kg (198 lb)   25 90.3 kg (199 lb)   10/30/24 89.5 kg (197 lb 4.8 oz)   24 89.1 kg (196 lb 6.4 oz)   24 88.9 kg (196  lb)       BP Readings from Last 3 Encounters:   06/03/25 (!) 153/89   05/01/25 130/81   03/10/25 134/74     Blood pressure (!) 153/89, pulse 91, weight 87.5 kg (193 lb), SpO2 94%.    General: Well appearing and in no distress.   Eyes: EOMI. Sclerae and conjunctivae are clear.    HENT: No thyromegaly or mass.    Lymphatic: No cervical lymphadenopathy.  Cardiovascular: RRR, with normal S1+S2 and no murmurs.   Respiratory: Lungs are clear to auscultation.   Gastrointestinal: Abdomen is soft, non tender, and non-distended.   Extremities: Mild peripheral edema. Feet are warm and well perfused. No ulcers.       Labs and Studies:   Recent Labs   Lab Test 03/10/25  0448 03/09/25  0624 02/28/25  0816 02/18/25  0810 01/20/25  1905 01/07/25  1556 01/07/25  1554 09/20/24  1524 09/20/24  1516 09/20/24  1415 08/13/24  1509 05/21/24  1633 05/02/24  1348 02/06/24  1221 10/13/23  1137 07/19/23  1746 07/19/23  1411 03/24/21  0812 02/12/21  0000   A1C  --   --   --  8.4*  --   --   --   --   --  10.6*  --   --   --    < >  --    < >  --    < >  --    HEMOGLOBINA1  --   --   --   --   --   --   --   --   --   --   --   --   --   --  8.1  --   --   --  12.1*   TSH  --   --   --   --   --   --  1.79  --  51.29*  --   --   --  7.75*   < >  --    < >  --    < >  --    T4  --   --   --   --   --   --   --   --  0.54*  --   --   --  0.96   < >  --   --   --    < >  --    LDL  --   --   --   --   --   --   --   --   --   --  53  --   --   --   --   --  58   < >  --    HDL  --   --   --   --   --   --   --   --   --   --  73  --   --   --   --   --  61   < >  --    TRIG  --   --   --   --   --   --   --   --   --   --  102  --   --   --   --   --  170*   < >  --    CR 1.29* 1.10   < >  --    < >  --  1.28*  --  1.23*  --  1.06   < > 0.97   < >  --    < > 1.00   < >  --    MICROL  --   --   --   --   --  3,911.0  --  >4,400.0  --   --   --   --   --   --   --   --   --    < >  --     < > = values in this interval not displayed.     FIB-4  Calculation: 1.53 at 1/20/2025  7:05 PM  Calculated from:  SGOT/AST: 18 U/L at 1/20/2025  7:05 PM  SGPT/ALT: 15 U/L at 1/20/2025  7:05 PM  Platelets: 215 10e3/uL at 1/20/2025  7:05 PM  Age: 69 years    GFR Estimate   Date Value Ref Range Status   03/10/2025 59 (L) >60 mL/min/1.73m2 Final     Comment:     eGFR calculated using 2021 CKD-EPI equation.   03/09/2025 72 >60 mL/min/1.73m2 Final     Comment:     eGFR calculated using 2021 CKD-EPI equation.   03/08/2025 87 >60 mL/min/1.73m2 Final     Comment:     eGFR calculated using 2021 CKD-EPI equation.   07/07/2021 >90 >60 mL/min/[1.73_m2] Final     Comment:     Non  GFR Calc  Starting 12/18/2018, serum creatinine based estimated GFR (eGFR) will be   calculated using the Chronic Kidney Disease Epidemiology Collaboration   (CKD-EPI) equation.     04/23/2021 >90 >60 mL/min/[1.73_m2] Final     Comment:     Non  GFR Calc  Starting 12/18/2018, serum creatinine based estimated GFR (eGFR) will be   calculated using the Chronic Kidney Disease Epidemiology Collaboration   (CKD-EPI) equation.     03/24/2021 89 >60 mL/min/[1.73_m2] Final     Comment:     Non  GFR Calc  Starting 12/18/2018, serum creatinine based estimated GFR (eGFR) will be   calculated using the Chronic Kidney Disease Epidemiology Collaboration   (CKD-EPI) equation.               Assessment:  Post transplant diabetes mellitus.    A. Mild peripheral neuropathy, managed with gabapentin. (He reports numbness in toes since prior to the diabetes diagnosis).    B. Elevated urine albumin and reduced eGFR. He saw nephrology in the past, has been referred to their clinic again by his PCP.   2. Hypothyroidism treated with levothyroxine. Not currently taking this medication, and TSH is high.   3. S/p liver transplant.   4. Prostate cancer s/p prostatectomy.   5. Hypertension. Unclear to me which medications for BP he is currently taking   6. CAD s/p stent.     Plan:   -  Continue current medications for diabetes - glargine 20 units daily and metformin.  Provided education today that pancreas does have a decreasing ability to produce insulin over time and that providing insulin will support ongoing insulin production for a longer period of time.  He expresses gratitude for this explanation and states that he will try to take it more regularly, if his evening blood sugar is less than 130 he agrees to take 10 or 15 units and let us know if he has any difficulties with low blood sugars.     -He denies current troubles with low blood sugar.  -Education about high blood pressure and needing to schedule visits with primary care.  He is advised to schedule a visit with primary care.  We wrote out instructions for which blood pressure medications he should be taking and renewed them so that he can pick them up at his pharmacy he understands that they will be for blood pressure.   Ideally would like him to transition to an ACE or ARB medication if this were possible as he does have significant microalbuminuria.  - Labs today.     Follow up in 3 months with myself or Dr. Tavares.     45 minutes spent on the date of the encounter doing chart review, history and exam, documentation and further activities per the note.     It is my privilege to be involved in the care of the above patient.     Leia Edward PA-C, MPAS  Diabetes, Endocrinology, and Metabolism  [phone] Appointments/Nurse Line  [phone] Fax  [phone] office  [email or web address]                     05/20/25 12:03 PM  PATIENT LAB/IMAGING STATUS : Orders completed / No further action needed  Patient is showing 4/5 MNCM met. A1c not in range   Outcome for 05/20/25 12:04 PM: [employer] message sent       Date Time Reading Time  Reading  Time  Reading  Time  Reading  Time  Reading    5/31 7AM 221           5/30 6AM 168           5/29 6AM 202           5/28 7AM 154           5/27 10AM 218 7AM 88         5/26 6AM 153 10PM 218          5/25 11PM 284 7AM 137           Date Time Reading Time  Reading  Time  Reading  Time  Reading  Time  Reading    5/24 6AM 102 8PM 270         5/23 6AM 174 7PM 261         5/21 6AM 134           5/20 8AM 172           5/19 6AM 148 7PM 261         5/17 5PM 270           5/16 6AM 214

## 2025-06-03 ENCOUNTER — OFFICE VISIT (OUTPATIENT)
Dept: ENDOCRINOLOGY | Facility: CLINIC | Age: 70
End: 2025-06-03
Payer: COMMERCIAL

## 2025-06-03 ENCOUNTER — RESULTS FOLLOW-UP (OUTPATIENT)
Dept: ENDOCRINOLOGY | Facility: CLINIC | Age: 70
End: 2025-06-03

## 2025-06-03 VITALS
BODY MASS INDEX: 33.13 KG/M2 | DIASTOLIC BLOOD PRESSURE: 89 MMHG | WEIGHT: 193 LBS | OXYGEN SATURATION: 94 % | SYSTOLIC BLOOD PRESSURE: 153 MMHG | HEART RATE: 91 BPM

## 2025-06-03 LAB
EST. AVERAGE GLUCOSE BLD GHB EST-MCNC: 200 MG/DL
HBA1C MFR BLD: 8.6 %

## 2025-06-03 PROCEDURE — 83036 HEMOGLOBIN GLYCOSYLATED A1C: CPT | Performed by: PATHOLOGY

## 2025-06-03 ASSESSMENT — PAIN SCALES - GENERAL: PAINLEVEL_OUTOF10: MODERATE PAIN (4)

## 2025-06-03 NOTE — LETTER
6/3/2025       RE: Loy Limno  [address]  [place] [address]     Dear Colleague,    Thank you for referring your patient, Loy Limon, to the [facility] [place] [facility]. Please see a copy of my visit note below.    05/20/25 12:03 PM  PATIENT LAB/IMAGING STATUS : Orders completed / No further action needed  Patient is showing 4/5 MNCM met. A1c not in range   Outcome for 05/20/25 12:04 PM: "[employer] message sent       Date Time Reading Time  Reading  Time  Reading  Time  Reading  Time  Reading    5/31 7AM 221           5/30 6AM 168           5/29 6AM 202           5/28 7AM 154           5/27 10AM 218 7AM 88         5/26 6AM 153 10PM 218         5/25 11PM 284 7AM 137           Date Time Reading Time  Reading  Time  Reading  Time  Reading  Time  Reading    5/24 6AM 102 8PM 270         5/23 6AM 174 7PM 261         5/21 6AM 134           5/20 8AM 172           5/19 6AM 148 7PM 261         5/17 5PM 270           5/16 6AM 214                 Again, thank you for allowing me to participate in the care of your patient.      Sincerely,    Leia Edward PA-C

## 2025-06-03 NOTE — NURSING NOTE
"Chief Complaint   Patient presents with    Diabetes     Vital signs:      BP: (!) 153/89 Pulse: 91     SpO2: 94 %       Weight: 87.5 kg (193 lb)  Estimated body mass index is 33.13 kg/m  as calculated from the following:    Height as of 3/7/25: 1.626 m (5' 4\").    Weight as of this encounter: 87.5 kg (193 lb).    Loy states that a week ago he had a very sharp stabbing pain in his abdomen and thinks it might be his liver, he states that he wants a CT scan.    He also wants a refill on his prednisone, Dr. Anne is his PCP that prescribes it.     -- Loy would like to have an eye exam as well, saying that his vision has been blurry.    "

## 2025-06-03 NOTE — PATIENT INSTRUCTIONS
Metas para azucar:  En ayunas: 90 - 140  Antes de la comida o la tony: 90 - 180:  Para dormir: 100 - 200.      [place] samaniego Lantus 20 unidades TODOS las noches.    Si en la noche el azucares <130, baje Lantus a 15 unidades.      My best wishes,    Leia Edward PA-C, MPAS  [facility]  Diabetes, Endocrinology, and Metabolism  [phone] Appointments/Nurse  [phone] Fax

## 2025-06-09 ENCOUNTER — DOCUMENTATION ONLY (OUTPATIENT)
Dept: FAMILY MEDICINE | Facility: CLINIC | Age: 70
End: 2025-06-09
Payer: COMMERCIAL

## 2025-06-09 NOTE — PROGRESS NOTES
Type of Form Received: [facility] 5/27    Form Received (Date) 6/6/25   Form Filled out Yes, faxed 6/11   Placed in provider folder Yes

## 2025-06-10 ENCOUNTER — RESULTS FOLLOW-UP (OUTPATIENT)
Dept: FAMILY MEDICINE | Facility: CLINIC | Age: 70
End: 2025-06-10

## 2025-06-10 ENCOUNTER — MEDICAL CORRESPONDENCE (OUTPATIENT)
Dept: HEALTH INFORMATION MANAGEMENT | Facility: CLINIC | Age: 70
End: 2025-06-10

## 2025-06-10 ENCOUNTER — OFFICE VISIT (OUTPATIENT)
Dept: FAMILY MEDICINE | Facility: CLINIC | Age: 70
End: 2025-06-10
Payer: COMMERCIAL

## 2025-06-10 ENCOUNTER — LAB (OUTPATIENT)
Dept: LAB | Facility: CLINIC | Age: 70
End: 2025-06-10
Payer: COMMERCIAL

## 2025-06-10 VITALS
RESPIRATION RATE: 18 BRPM | HEIGHT: 64 IN | TEMPERATURE: 98 F | WEIGHT: 192 LBS | SYSTOLIC BLOOD PRESSURE: 166 MMHG | DIASTOLIC BLOOD PRESSURE: 91 MMHG | OXYGEN SATURATION: 98 % | BODY MASS INDEX: 32.78 KG/M2 | HEART RATE: 91 BPM

## 2025-06-10 DIAGNOSIS — E03.9 HYPOTHYROIDISM, UNSPECIFIED TYPE: ICD-10-CM

## 2025-06-10 DIAGNOSIS — M79.605 BILATERAL LEG PAIN: ICD-10-CM

## 2025-06-10 DIAGNOSIS — Z94.4 LIVER REPLACED BY TRANSPLANT (H): ICD-10-CM

## 2025-06-10 DIAGNOSIS — I10 ESSENTIAL HYPERTENSION: ICD-10-CM

## 2025-06-10 DIAGNOSIS — E11.65 UNCONTROLLED TYPE 2 DIABETES MELLITUS WITH HYPERGLYCEMIA (H): ICD-10-CM

## 2025-06-10 DIAGNOSIS — I25.118 CORONARY ARTERY DISEASE OF NATIVE ARTERY OF NATIVE HEART WITH STABLE ANGINA PECTORIS: ICD-10-CM

## 2025-06-10 DIAGNOSIS — M79.604 BILATERAL LEG PAIN: ICD-10-CM

## 2025-06-10 DIAGNOSIS — Z94.4 LIVER TRANSPLANTED (H): ICD-10-CM

## 2025-06-10 DIAGNOSIS — C61 PROSTATE CANCER (H): ICD-10-CM

## 2025-06-10 DIAGNOSIS — N18.2 CHRONIC KIDNEY DISEASE, STAGE 2 (MILD): ICD-10-CM

## 2025-06-10 DIAGNOSIS — E11.65 UNCONTROLLED TYPE 2 DIABETES MELLITUS WITH HYPERGLYCEMIA (H): Primary | ICD-10-CM

## 2025-06-10 LAB
ALBUMIN SERPL BCG-MCNC: 3.7 G/DL (ref 3.5–5.2)
ALBUMIN SERPL BCG-MCNC: 3.7 G/DL (ref 3.5–5.2)
ALP SERPL-CCNC: 141 U/L (ref 40–150)
ALP SERPL-CCNC: 142 U/L (ref 40–150)
ALT SERPL W P-5'-P-CCNC: 13 U/L (ref 0–70)
ALT SERPL W P-5'-P-CCNC: 13 U/L (ref 0–70)
ANION GAP SERPL CALCULATED.3IONS-SCNC: 9 MMOL/L (ref 7–15)
AST SERPL W P-5'-P-CCNC: 15 U/L (ref 0–45)
AST SERPL W P-5'-P-CCNC: 15 U/L (ref 0–45)
BASOPHILS # BLD AUTO: 0 10E3/UL (ref 0–0.2)
BASOPHILS NFR BLD AUTO: 0 %
BILIRUB SERPL-MCNC: 0.6 MG/DL
BILIRUB SERPL-MCNC: 0.6 MG/DL
BILIRUBIN DIRECT (ROCHE PRO & PURE): 0.26 MG/DL (ref 0–0.45)
BUN SERPL-MCNC: 23 MG/DL (ref 8–23)
CALCIUM SERPL-MCNC: 9.2 MG/DL (ref 8.8–10.4)
CHLORIDE SERPL-SCNC: 103 MMOL/L (ref 98–107)
CREAT SERPL-MCNC: 1.08 MG/DL (ref 0.67–1.17)
CREAT UR-MCNC: 90.9 MG/DL
EGFRCR SERPLBLD CKD-EPI 2021: 73 ML/MIN/1.73M2
EOSINOPHIL # BLD AUTO: 0.1 10E3/UL (ref 0–0.7)
EOSINOPHIL NFR BLD AUTO: 2 %
ERYTHROCYTE [DISTWIDTH] IN BLOOD BY AUTOMATED COUNT: 13.5 % (ref 10–15)
GLUCOSE SERPL-MCNC: 156 MG/DL (ref 70–99)
HCO3 SERPL-SCNC: 26 MMOL/L (ref 22–29)
HCT VFR BLD AUTO: 48 % (ref 40–53)
HGB BLD-MCNC: 15.2 G/DL (ref 13.3–17.7)
IMM GRANULOCYTES # BLD: 0 10E3/UL
IMM GRANULOCYTES NFR BLD: 0 %
LYMPHOCYTES # BLD AUTO: 1.1 10E3/UL (ref 0.8–5.3)
LYMPHOCYTES NFR BLD AUTO: 19 %
MCH RBC QN AUTO: 28 PG (ref 26.5–33)
MCHC RBC AUTO-ENTMCNC: 31.7 G/DL (ref 31.5–36.5)
MCV RBC AUTO: 89 FL (ref 78–100)
MICROALBUMIN UR-MCNC: 3040 MG/L
MICROALBUMIN/CREAT UR: 3344.33 MG/G CR (ref 0–17)
MONOCYTES # BLD AUTO: 0.4 10E3/UL (ref 0–1.3)
MONOCYTES NFR BLD AUTO: 6 %
NEUTROPHILS # BLD AUTO: 4.3 10E3/UL (ref 1.6–8.3)
NEUTROPHILS NFR BLD AUTO: 72 %
NRBC # BLD AUTO: 0 10E3/UL
NRBC BLD AUTO-RTO: 0 /100
PLATELET # BLD AUTO: 219 10E3/UL (ref 150–450)
POTASSIUM SERPL-SCNC: 5.3 MMOL/L (ref 3.4–5.3)
PROT SERPL-MCNC: 6.5 G/DL (ref 6.4–8.3)
PROT SERPL-MCNC: 6.5 G/DL (ref 6.4–8.3)
RBC # BLD AUTO: 5.42 10E6/UL (ref 4.4–5.9)
SODIUM SERPL-SCNC: 138 MMOL/L (ref 135–145)
WBC # BLD AUTO: 6 10E3/UL (ref 4–11)

## 2025-06-10 PROCEDURE — 99000 SPECIMEN HANDLING OFFICE-LAB: CPT | Performed by: PATHOLOGY

## 2025-06-10 PROCEDURE — 3079F DIAST BP 80-89 MM HG: CPT | Performed by: FAMILY MEDICINE

## 2025-06-10 PROCEDURE — 84075 ASSAY ALKALINE PHOSPHATASE: CPT | Performed by: PATHOLOGY

## 2025-06-10 PROCEDURE — 3077F SYST BP >= 140 MM HG: CPT | Performed by: FAMILY MEDICINE

## 2025-06-10 PROCEDURE — 90480 ADMN SARSCOV2 VAC 1/ONLY CMP: CPT | Performed by: FAMILY MEDICINE

## 2025-06-10 PROCEDURE — 84460 ALANINE AMINO (ALT) (SGPT): CPT | Performed by: PATHOLOGY

## 2025-06-10 PROCEDURE — 84155 ASSAY OF PROTEIN SERUM: CPT | Performed by: PATHOLOGY

## 2025-06-10 PROCEDURE — 82247 BILIRUBIN TOTAL: CPT | Performed by: PATHOLOGY

## 2025-06-10 PROCEDURE — 84450 TRANSFERASE (AST) (SGOT): CPT | Performed by: PATHOLOGY

## 2025-06-10 PROCEDURE — 82043 UR ALBUMIN QUANTITATIVE: CPT | Performed by: FAMILY MEDICINE

## 2025-06-10 PROCEDURE — 85025 COMPLETE CBC W/AUTO DIFF WBC: CPT | Performed by: PATHOLOGY

## 2025-06-10 PROCEDURE — 82248 BILIRUBIN DIRECT: CPT | Performed by: PATHOLOGY

## 2025-06-10 PROCEDURE — 91320 SARSCV2 VAC 30MCG TRS-SUC IM: CPT | Performed by: FAMILY MEDICINE

## 2025-06-10 PROCEDURE — 99214 OFFICE O/P EST MOD 30 MIN: CPT | Mod: 25 | Performed by: FAMILY MEDICINE

## 2025-06-10 PROCEDURE — 36415 COLL VENOUS BLD VENIPUNCTURE: CPT | Performed by: PATHOLOGY

## 2025-06-10 RX ORDER — METOPROLOL SUCCINATE 25 MG/1
25 TABLET, EXTENDED RELEASE ORAL DAILY
Qty: 90 TABLET | Refills: 3 | Status: SHIPPED | OUTPATIENT
Start: 2025-06-10

## 2025-06-10 NOTE — PROGRESS NOTES
Assessment & Plan     Uncontrolled type 2 diabetes mellitus with hyperglycemia (H)    - Adult Eye  Referral; Future  - Albumin Random Urine Quantitative with Creat Ratio; Future  - Comprehensive metabolic panel (BMP + Alb, Alk Phos, ALT, AST, Total. Bili, TP); Future    Essential hypertension    - CBC with platelets and differential; Future    Liver transplanted (H)  Liver labs today    Hypothyroidism, unspecified type  TSH utd    Bilateral leg pain  Gone    Chronic kidney disease, stage 2 (mild)  Labs today    Coronary artery disease of native artery of native heart with stable angina pectoris  No cardio sx since March hospital trip per pt    Prostate cancer (H)  Due for routine fu  - Adult Urology  Referral; Future    The longitudinal plan of care for the diagnosis(es)/condition(s) as documented were addressed during this visit. Due to the added complexity in care, I will continue to support Loy in the subsequent management and with ongoing continuity of care.  35 minutes spent by me on the date of the encounter doing chart review, history and exam, documentation and further activities per the note            Subjective   Loy is a 70 year old, presenting for the following health issues:  Follow Up (Pt here to follow up; would like to review medication)      6/10/2025     7:06 AM   Additional Questions   Roomed by Bhavya     History of Present Illness       Reason for visit:  Follow up; review medications       Here in follow-up w/ ipad   Last time he and I met he c/o leg pain bilat; we review that complaint. It is gone on its own. No leg pain.   Only MD he needs not in upcoming appts is Eye, will make visit for him  Due for covid shot he accepts  Beta is listed in our records as 1/2 tab every day but per pt he takes a full tab every day and bp if anything still elevated will rx as one tab/day  Plans trip to [place] in Oct we agree to meet again about a mo before  that  Recent in pt stay reviewed no cardiopulm sx since home from that  Rvwd ct done then raddiology did not comment on any imaging abnl of liver, he wondered, sees transplant next   Past Medical History:   Diagnosis Date    Anemia     Biliary stricture of transplanted liver (H) 2018    BPH (benign prostatic hyperplasia)     Cancer (H)     hepatocellualr carcinoma    Cirrhosis of liver (H) 2018    Diabetes (H)     Enlarged prostate     Hypothyroidism     Impotence of organic origin 2020    Inguinal hernia     Repaired with mesh on 18    Liver lesion 2018    Liver transplanted (H) 2018     donor liver transplant    Portal vein thrombosis     on path explant    Postoperative atrial fibrillation (H) 2018    Prostate cancer (H)     TB lung, latent     Treated      Past Surgical History:   Procedure Laterality Date    APPENDECTOMY      BRONCHOSCOPY (RIGID OR FLEXIBLE), DIAGNOSTIC N/A 2024    Procedure: BRONCHOSCOPY, WITH BRONCHOALVEOLAR LAVAGE;  Surgeon: Jimmy Farley MD;  Location:  GI    COLONOSCOPY          CV CORONARY ANGIOGRAM N/A 10/13/2021    Procedure: CV CORONARY ANGIOGRAM;  Surgeon: Yaya Hampton MD;  Location:  HEART CARDIAC CATH LAB    CV CORONARY ANGIOGRAM N/A 3/9/2025    Procedure: Coronary Angiogram;  Surgeon: Randy Doss MD;  Location:  HEART CARDIAC CATH LAB    CV CORONARY LITHOTRIPSY PCI N/A 10/13/2021    Procedure: CV Coronary Lithotripsy PCI;  Surgeon: Yaya Hampton MD;  Location:  HEART CARDIAC CATH LAB    CV INSTANTANEOUS WAVE-FREE RATIO N/A 10/13/2021    Procedure: Instantaneous Wave-Free Ratio;  Surgeon: Yaya Hampton MD;  Location:  HEART CARDIAC CATH LAB    CV INTRAVASULAR ULTRASOUND N/A 10/13/2021    Procedure: Intravascular Ultrasound;  Surgeon: Yaya Hampton MD;  Location:  HEART CARDIAC CATH LAB    CV PCI N/A 3/9/2025    Procedure:  "Percutaneous Coronary Intervention;  Surgeon: Randy Doss MD;  Location:  HEART CARDIAC CATH LAB    CV PCI STENT DRUG ELUTING N/A 10/13/2021    Procedure: Percutaneous Coronary Intervention Stent Drug Eluting;  Surgeon: Yaya Hampton MD;  Location:  HEART CARDIAC CATH LAB    DAVINCI PROSTATECTOMY N/A 2020    Procedure: Robot-assisted laparoscopic radical prostatectomy, Bladder biopsy;  Surgeon: Kenrick Casiano MD;  [facility]: [facility]    ENDOSCOPIC RETROGRADE CHOLANGIOPANCREATOGRAM N/A 2018    Procedure: ENDOSCOPIC RETROGRADE CHOLANGIOPANCREATOGRAM, with Billary Sphincterotomy and Billary Stent Placement;  Surgeon: Omero Lawler MD;  [facility]    ENDOSCOPIC RETROGRADE CHOLANGIOPANCREATOGRAM  2019    Procedure: COMBINED ENDOSCOPIC RETROGRADE CHOLANGIOPANCREATOGRAPHY, Bile Duct Stent Exchange;  Surgeon: Omero Lawler MD;  [facility]    ENDOSCOPIC RETROGRADE CHOLANGIOPANCREATOGRAM N/A 2019    Procedure: ENDOSCOPIC RETROGRADE CHOLANGIOPANCREATOGRAPHY, WITH BILIARY STENT REMOVAL AND STONE EXTRACTION;  Surgeon: Omero Lawler MD;  [facility]    HERNIORRHAPHY INGUINAL  2018    Procedure: Herniorrhaphy inguinal;  Surgeon: Emil Echevarria MD;  [facility]    IR LIVER BIOPSY PERCUTANEOUS  2018    LAPAROTOMY EXPLORATORY      per the patient \"for infection in the LLQ\"     PICC INSERTION Right 2024    47 cm basilic    TRANSPLANT LIVER RECIPIENT  DONOR N/A 2018    Procedure: TRANSPLANT LIVER RECIPIENT  DONOR;  Surgeon: Emil Echevarria MD;  [facility]     Current Outpatient Medications   Medication Sig Dispense Refill    apixaban ANTICOAGULANT (ELIQUIS) 5 MG tablet Take 1 tablet (5 mg) by mouth 2 times daily. 180 tablet 3    atorvastatin (LIPITOR) 40 MG tablet Take 1 tablet (40 mg) by mouth daily. 90 tablet 2    diltiazem ER COATED BEADS (CARDIZEM CD/CARTIA XT) 120 " MG 24 hr capsule Take 1 capsule (120 mg) by mouth daily. 90 capsule 3    hydrALAZINE (APRESOLINE) 25 MG tablet Take 1 tablet (25 mg) by mouth 2 times daily. 180 tablet 3    insulin glargine (LANTUS SOLOSTAR) 100 UNIT/ML pen Inject 20 Units subcutaneously at bedtime. 15 mL 1    levothyroxine (SYNTHROID/LEVOTHROID) 150 MCG tablet Take 1 tablet (150 mcg) by mouth daily. 90 tablet 3    metFORMIN (GLUCOPHAGE XR) 500 MG 24 hr tablet Take 2 tablets (1,000 mg) by mouth 2 times daily (with meals). 360 tablet 3    metoprolol succinate ER (TOPROL XL) 25 MG 24 hr tablet Take 1 tablet (25 mg) by mouth daily. 90 tablet 3    mycophenolate (GENERIC EQUIVALENT) 500 MG tablet Take 1 tablet (500 mg) by mouth 2 times daily 180 tablet 3    predniSONE (DELTASONE) 5 MG tablet Take 1 tablet (5 mg) by mouth daily. 90 tablet 1    aspirin (ASA) 81 MG chewable tablet Take 1 tablet (81 mg) by mouth daily. (Patient not taking: Reported on 6/10/2025) 90 tablet 3    Vitamin D3 (VITAMIN D, CHOLECALCIFEROL,) 25 mcg (1000 units) tablet Take 2,000 Units by mouth daily. (Patient not taking: Reported on 6/10/2025)       No current facility-administered medications for this visit.     No Known Allergies  Family History   Problem Relation Age of Onset    Memory loss Mother     Liver Disease Brother     Skin Cancer No family hx of     Melanoma No family hx of      Social History     Socioeconomic History    Marital status:      Spouse name: Not on file    Number of children: Not on file    Years of education: Not on file    Highest education level: Not on file   Occupational History    Not on file   Tobacco Use    Smoking status: Former     Current packs/day: 0.00     Average packs/day: (1.4 ttl pk-yrs)     Types: Cigarettes, Cigars     Start date: 1970     Quit date: 3/14/2018     Years since quittin.2    Smokeless tobacco: Never    Tobacco comments:     Quit smoking 2018, one cigarette after dinner   Vaping Use    Vaping status: Never  Used   Substance and Sexual Activity    Alcohol use: No     Comment: Quit drinking Feb 2018    Drug use: No    Sexual activity: Not on file   Other Topics Concern    Parent/sibling w/ CABG, MI or angioplasty before 65F 55M? Not Asked   Social History Narrative    Born in [place], came to [place] 30 years ago.     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cardboard, trimming meats     Social Drivers of Health     Financial Resource Strain: Low Risk  (3/8/2025)    Financial Resource Strain     Within the past 12 months, have you or your family members you live with been unable to get utilities (heat, electricity) when it was really needed?: No   Food Insecurity: Low Risk  (3/8/2025)    Food Insecurity     Within the past 12 months, did you worry that your food would run out before you got money to buy more?: No     Within the past 12 months, did the food you bought just not last and you didn t have money to get more?: No   Transportation Needs: Low Risk  (3/8/2025)    Transportation Needs     Within the past 12 months, has lack of transportation kept you from medical appointments, getting your medicines, non-medical meetings or appointments, work, or from getting things that you need?: No   Physical Activity: Not on file   Stress: Not on file   Social Connections: Not on file   Interpersonal Safety: Low Risk  (3/8/2025)    Interpersonal Safety     Do you feel physically and emotionally safe where you currently live?: Yes     Within the past 12 months, have you been hit, slapped, kicked or otherwise physically hurt by someone?: No     Within the past 12 months, have you been humiliated or emotionally abused in other ways by your partner or ex-partner?: No   Housing Stability: High Risk (3/8/2025)    Housing Stability     Do you have housing? : No     Are you worried about losing your housing?: No         Objective    BP (!) 150/85 (BP Location: Right arm, Patient Position: Sitting, Cuff Size: Adult Regular)   Pulse 91   Temp 98  F  "(36.7  C) (Oral)   Resp 18   Ht 1.626 m (5' 4\")   Wt 87.1 kg (192 lb)   SpO2 98%   BMI 32.96 kg/m    Body mass index is 32.96 kg/m .  Physical Exam   GENERAL: alert and no distress  NECK: no adenopathy, no asymmetry, masses, or scars  RESP: lungs clear to auscultation - no rales, rhonchi or wheezes  CV: regular rate and rhythm, normal S1 S2, no S3 or S4, no murmur, click or rub, no peripheral edema  ABDOMEN: soft, nontender, no hepatosplenomegaly, no masses and bowel sounds normal  MS: no gross musculoskeletal defects noted, no edema            Signed Electronically by: Jaswinder Anne MD    "

## 2025-06-11 ENCOUNTER — PATIENT OUTREACH (OUTPATIENT)
Dept: CARE COORDINATION | Facility: CLINIC | Age: 70
End: 2025-06-11
Payer: COMMERCIAL

## 2025-06-11 ENCOUNTER — DOCUMENTATION ONLY (OUTPATIENT)
Dept: FAMILY MEDICINE | Facility: CLINIC | Age: 70
End: 2025-06-11
Payer: COMMERCIAL

## 2025-06-11 NOTE — PROGRESS NOTES
Type of Form Received: [facility]    Form Received (Date) 6/11/25   Form Filled out No   Placed in provider folder Yes

## 2025-06-12 DIAGNOSIS — Z53.9 DIAGNOSIS NOT YET DEFINED: Primary | ICD-10-CM

## 2025-06-17 ENCOUNTER — OFFICE VISIT (OUTPATIENT)
Dept: CARDIOLOGY | Facility: CLINIC | Age: 70
End: 2025-06-17
Attending: INTERNAL MEDICINE
Payer: COMMERCIAL

## 2025-06-17 ENCOUNTER — OFFICE VISIT (OUTPATIENT)
Dept: OPTOMETRY | Facility: CLINIC | Age: 70
End: 2025-06-17
Attending: FAMILY MEDICINE
Payer: COMMERCIAL

## 2025-06-17 ENCOUNTER — APPOINTMENT (OUTPATIENT)
Dept: LAB | Facility: CLINIC | Age: 70
End: 2025-06-17
Attending: INTERNAL MEDICINE
Payer: COMMERCIAL

## 2025-06-17 VITALS
SYSTOLIC BLOOD PRESSURE: 157 MMHG | HEART RATE: 82 BPM | OXYGEN SATURATION: 98 % | WEIGHT: 193.2 LBS | DIASTOLIC BLOOD PRESSURE: 82 MMHG | BODY MASS INDEX: 33.16 KG/M2

## 2025-06-17 DIAGNOSIS — R00.0 TACHYCARDIA: ICD-10-CM

## 2025-06-17 DIAGNOSIS — H52.4 PRESBYOPIA: ICD-10-CM

## 2025-06-17 DIAGNOSIS — H25.13 SENILE NUCLEAR SCLEROSIS, BILATERAL: ICD-10-CM

## 2025-06-17 DIAGNOSIS — I48.91 ATRIAL FIBRILLATION WITH RAPID VENTRICULAR RESPONSE (H): Primary | ICD-10-CM

## 2025-06-17 DIAGNOSIS — E11.9 TYPE 2 DIABETES MELLITUS WITHOUT RETINOPATHY (H): Primary | ICD-10-CM

## 2025-06-17 DIAGNOSIS — I48.91 ATRIAL FIBRILLATION WITH RAPID VENTRICULAR RESPONSE (H): ICD-10-CM

## 2025-06-17 LAB
ATRIAL RATE - MUSE: NORMAL BPM
ATRIAL RATE - MUSE: NORMAL BPM
DIASTOLIC BLOOD PRESSURE - MUSE: NORMAL MMHG
DIASTOLIC BLOOD PRESSURE - MUSE: NORMAL MMHG
INTERPRETATION ECG - MUSE: NORMAL
INTERPRETATION ECG - MUSE: NORMAL
P AXIS - MUSE: NORMAL DEGREES
P AXIS - MUSE: NORMAL DEGREES
PR INTERVAL - MUSE: NORMAL MS
PR INTERVAL - MUSE: NORMAL MS
QRS DURATION - MUSE: 86 MS
QRS DURATION - MUSE: 90 MS
QT - MUSE: 354 MS
QT - MUSE: 390 MS
QTC - MUSE: 413 MS
QTC - MUSE: 441 MS
R AXIS - MUSE: -20 DEGREES
R AXIS - MUSE: -24 DEGREES
SYSTOLIC BLOOD PRESSURE - MUSE: NORMAL MMHG
SYSTOLIC BLOOD PRESSURE - MUSE: NORMAL MMHG
T AXIS - MUSE: 137 DEGREES
T AXIS - MUSE: 148 DEGREES
VENTRICULAR RATE- MUSE: 77 BPM
VENTRICULAR RATE- MUSE: 82 BPM

## 2025-06-17 PROCEDURE — 93005 ELECTROCARDIOGRAM TRACING: CPT

## 2025-06-17 PROCEDURE — 3077F SYST BP >= 140 MM HG: CPT | Performed by: INTERNAL MEDICINE

## 2025-06-17 PROCEDURE — 3079F DIAST BP 80-89 MM HG: CPT | Performed by: INTERNAL MEDICINE

## 2025-06-17 PROCEDURE — G0463 HOSPITAL OUTPT CLINIC VISIT: HCPCS | Performed by: INTERNAL MEDICINE

## 2025-06-17 PROCEDURE — 99215 OFFICE O/P EST HI 40 MIN: CPT | Performed by: INTERNAL MEDICINE

## 2025-06-17 PROCEDURE — 1126F AMNT PAIN NOTED NONE PRSNT: CPT | Performed by: INTERNAL MEDICINE

## 2025-06-17 RX ORDER — METOPROLOL SUCCINATE 25 MG/1
25 TABLET, EXTENDED RELEASE ORAL DAILY
Qty: 90 TABLET | Refills: 3 | Status: SHIPPED | OUTPATIENT
Start: 2025-06-17

## 2025-06-17 ASSESSMENT — CUP TO DISC RATIO
OS_RATIO: 0.5
OD_RATIO: 0.5

## 2025-06-17 ASSESSMENT — VISUAL ACUITY
METHOD: SNELLEN - LINEAR
OD_CC: 20/25
OD_CC+: +2
OS_CC: 20/30

## 2025-06-17 ASSESSMENT — REFRACTION_MANIFEST
OS_ADD: +2.50
OS_SPHERE: +0.25
OD_AXIS: 180
OD_SPHERE: +0.50
OS_CYLINDER: +0.75
OD_ADD: +2.50
OD_CYLINDER: +0.75
OS_AXIS: 005

## 2025-06-17 ASSESSMENT — TONOMETRY
OD_IOP_MMHG: 12
IOP_METHOD: ICARE
OS_IOP_MMHG: 13

## 2025-06-17 ASSESSMENT — CONF VISUAL FIELD: COMMENTS: POOR COOPERATION

## 2025-06-17 ASSESSMENT — EXTERNAL EXAM - LEFT EYE: OS_EXAM: NORMAL

## 2025-06-17 ASSESSMENT — EXTERNAL EXAM - RIGHT EYE: OD_EXAM: NORMAL

## 2025-06-17 ASSESSMENT — PAIN SCALES - GENERAL: PAINLEVEL_OUTOF10: NO PAIN (0)

## 2025-06-17 NOTE — PROGRESS NOTES
HPI: Mr. Limon is a pleasant 70-year-old male who is being seen today for management of atrial fibrillation.  A telephone-based  is present.    Patient has multiple comorbidities including hypertension, coronary artery disease with previous PCI,  biliary disease with recent coronary arteriography, revealed widely patent mid LAD stent and distal LAD lesion of 30% no interventions were performed.    Mr Limon did indicate that he had a period of time when he had some weakness in his left arm and left leg but this seems to have resolved.  He attributes it to having taken aspirin at the time but the history is unclear.  Physical examination does not reveal any evident carotid bruits.    At this clinic visit patient indicates that he feels well.  He denies any chest discomfort or excessive breathlessness or heart failure related symptoms.  He is not noting palpitations although ECG and Zio patch tend to show still inadequate control of his permanent atrial fibrillation.  We will increase his metoprolol from 12.5 to 25 as of this visit and send a prescription for that to his pharmacy.  He will remain on diltiazem at 120 daily.    Patient did have questions related to his ongoing home health care.  I indicated that he needed to deal with that service directly.    Absent any change in his overall clinical picture we will plan to see him in 1 years time and he will continue to be followed in internal medicine by Dr. Anne      PAST MEDICAL HISTORY:  Past Medical History:   Diagnosis Date    Anemia     Biliary stricture of transplanted liver (H) 2018    BPH (benign prostatic hyperplasia)     Cancer (H)     hepatocellualr carcinoma    Cirrhosis of liver (H) 2018    Diabetes (H)     Enlarged prostate     Hypothyroidism     Impotence of organic origin 2020    Inguinal hernia     Repaired with mesh on 18    Liver lesion 2018    Liver transplanted (H) 2018     donor liver  transplant    Portal vein thrombosis     on path explant    Postoperative atrial fibrillation (H) 12/25/2018    Prostate cancer (H)     TB lung, latent     Treated        CURRENT MEDICATIONS:  Current Outpatient Medications   Medication Sig Dispense Refill    apixaban ANTICOAGULANT (ELIQUIS) 5 MG tablet Take 1 tablet (5 mg) by mouth 2 times daily. 180 tablet 3    atorvastatin (LIPITOR) 40 MG tablet Take 1 tablet (40 mg) by mouth daily. 90 tablet 2    insulin glargine (LANTUS SOLOSTAR) 100 UNIT/ML pen Inject 20 Units subcutaneously at bedtime. 15 mL 1    levothyroxine (SYNTHROID/LEVOTHROID) 150 MCG tablet Take 1 tablet (150 mcg) by mouth daily. 90 tablet 3    metFORMIN (GLUCOPHAGE XR) 500 MG 24 hr tablet Take 2 tablets (1,000 mg) by mouth 2 times daily (with meals). 360 tablet 3    metoprolol succinate ER (TOPROL XL) 25 MG 24 hr tablet Take 1 tablet (25 mg) by mouth daily. 90 tablet 3    predniSONE (DELTASONE) 5 MG tablet Take 1 tablet (5 mg) by mouth daily. 90 tablet 1    Vitamin D3 (VITAMIN D, CHOLECALCIFEROL,) 25 mcg (1000 units) tablet Take 2,000 Units by mouth daily.      aspirin (ASA) 81 MG chewable tablet Take 1 tablet (81 mg) by mouth daily. (Patient not taking: Reported on 6/17/2025) 90 tablet 3    diltiazem ER COATED BEADS (CARDIZEM CD/CARTIA XT) 120 MG 24 hr capsule Take 1 capsule (120 mg) by mouth daily. (Patient not taking: Reported on 6/17/2025) 90 capsule 3    hydrALAZINE (APRESOLINE) 25 MG tablet Take 1 tablet (25 mg) by mouth 2 times daily. (Patient not taking: Reported on 6/17/2025) 180 tablet 3    mycophenolate (GENERIC EQUIVALENT) 500 MG tablet Take 1 tablet (500 mg) by mouth 2 times daily (Patient not taking: Reported on 6/17/2025) 180 tablet 3       PAST SURGICAL HISTORY:  Past Surgical History:   Procedure Laterality Date    APPENDECTOMY      BRONCHOSCOPY (RIGID OR FLEXIBLE), DIAGNOSTIC N/A 03/05/2024    Procedure: BRONCHOSCOPY, WITH BRONCHOALVEOLAR LAVAGE;  Surgeon: Jimmy Farley MD;  Location:  [facility]    COLONOSCOPY      2018    CV CORONARY ANGIOGRAM N/A 10/13/2021    Procedure: CV CORONARY ANGIOGRAM;  Surgeon: Yaya Hampton MD;  Location:  HEART CARDIAC CATH LAB    CV CORONARY ANGIOGRAM N/A 3/9/2025    Procedure: Coronary Angiogram;  Surgeon: Randy Doss MD;  Location:  HEART CARDIAC CATH LAB    CV CORONARY LITHOTRIPSY PCI N/A 10/13/2021    Procedure: CV Coronary Lithotripsy PCI;  Surgeon: Yaya Hampton MD;  Location:  HEART CARDIAC CATH LAB    CV INSTANTANEOUS WAVE-FREE RATIO N/A 10/13/2021    Procedure: Instantaneous Wave-Free Ratio;  Surgeon: Yaya Hampton MD;  Location: [facility] CARDIAC CATH LAB    CV INTRAVASULAR ULTRASOUND N/A 10/13/2021    Procedure: Intravascular Ultrasound;  Surgeon: Yaya Hampton MD;  Location: [facility] CARDIAC CATH LAB    CV PCI N/A 3/9/2025    Procedure: Percutaneous Coronary Intervention;  Surgeon: Randy Doss MD;  Location:  HEART CARDIAC CATH LAB    CV PCI STENT DRUG ELUTING N/A 10/13/2021    Procedure: Percutaneous Coronary Intervention Stent Drug Eluting;  Surgeon: Yaya Hampton MD;  Location: [facility] CARDIAC CATH LAB    DAVINCI PROSTATECTOMY N/A 01/03/2020    Procedure: Robot-assisted laparoscopic radical prostatectomy, Bladder biopsy;  Surgeon: Kenrick Casiano MD;  [facility]    ENDOSCOPIC RETROGRADE CHOLANGIOPANCREATOGRAM N/A 12/28/2018    Procedure: ENDOSCOPIC RETROGRADE CHOLANGIOPANCREATOGRAM, with Billary Sphincterotomy and Billary Stent Placement;  Surgeon: Omero Lawler MD;  [facility]    ENDOSCOPIC RETROGRADE CHOLANGIOPANCREATOGRAM  02/18/2019    Procedure: COMBINED ENDOSCOPIC RETROGRADE CHOLANGIOPANCREATOGRAPHY, Bile Duct Stent Exchange;  Surgeon: Omero Lawler MD;  [facility]    ENDOSCOPIC RETROGRADE CHOLANGIOPANCREATOGRAM N/A 04/29/2019    Procedure: ENDOSCOPIC RETROGRADE CHOLANGIOPANCREATOGRAPHY, WITH  "BILIARY STENT REMOVAL AND STONE EXTRACTION;  Surgeon: Omero Lawler MD;  [facility]    HERNIORRHAPHY INGUINAL  2018    Procedure: Herniorrhaphy inguinal;  Surgeon: Emil Echevarria MD;  [facility]    IR LIVER BIOPSY PERCUTANEOUS  2018    LAPAROTOMY EXPLORATORY      per the patient \"for infection in the LLQ\"     PICC INSERTION Right 2024    47 cm basilic    TRANSPLANT LIVER RECIPIENT  DONOR N/A 2018    Procedure: TRANSPLANT LIVER RECIPIENT  DONOR;  Surgeon: Emil Echevarria MD;  [facility]       ALLERGIES:   No Known Allergies    FAMILY HISTORY:  Family History   Problem Relation Age of Onset    Memory loss Mother     Liver Disease Brother     Skin Cancer No family hx of     Melanoma No family hx of      SOCIAL HISTORY:  Social History     Tobacco Use    Smoking status: Former     Current packs/day: 0.00     Average packs/day: (1.4 ttl pk-yrs)     Types: Cigarettes, Cigars     Start date: 1970     Quit date: 3/14/2018     Years since quittin.2    Smokeless tobacco: Never    Tobacco comments:     Quit smoking 2018, one cigarette after dinner   Vaping Use    Vaping status: Never Used   Substance Use Topics    Alcohol use: No     Comment: Quit drinking 2018    Drug use: No       ROS:   Constitutional: No fever, chills, or sweats. Weight stable.   ENT: No visual disturbance, ear ache, epistaxis, sore throat.   Cardiovascular: As per HPI.   Respiratory: No cough, hemoptysis.    GI: No nausea, vomiting, Integument: Negative.   Psychiatric: Negative.   Hematologic:  Easy bruising, no easy bleeding.  Neuro: Negative.   Endocrinology: No significant heat or cold intolerance   Musculoskeletal: No myalgia.    Exam:  There were no vitals taken for this visit.  GENERAL APPEARANCE: healthy, alert and no distress  HEENT: no icterus, no xanthelasmas, normal pupil size and reaction, normal palate, mucosa moist, no central cyanosis  NECK: no " adenopathy, no asymmetry, masses, or scars, thyroid normal to palpation and no bruits, JVP not elevated  RESPIRATORY: lungs clear to auscultation - no rales, rhonchi or wheezes, no use of accessory muscles, no retractions, respirations are unlabored, normal respiratory rate  CARDIOVASCULAR:irregular rhythm, normal S1 with physiologic split S2,ABDOMEN: soft, non tender, without hepatosplenomegaly, no masses palpable, bowel sounds normal, aorta not enlarged by palpation, no abdominal bruits  EXTREMITIES: peripheral pulses normal, no edema, no bruits  NEURO: alert and oriented to person/place/time, normal speech, gait and affect  SKIN: no ecchymoses, no rashes    Labs:  CBC RESULTS:   Lab Results   Component Value Date    WBC 6.0 06/10/2025    WBC 7.0 07/07/2021    RBC 5.42 06/10/2025    RBC 4.83 07/07/2021    HGB 15.2 06/10/2025    HGB 14.0 07/07/2021    HCT 48.0 06/10/2025    HCT 40.1 07/07/2021    MCV 89 06/10/2025    MCV 83 07/07/2021    MCH 28.0 06/10/2025    MCH 29.0 07/07/2021    MCHC 31.7 06/10/2025    MCHC 34.9 07/07/2021    RDW 13.5 06/10/2025    RDW 12.2 07/07/2021     06/10/2025     07/07/2021       BMP RESULTS:  Lab Results   Component Value Date     06/10/2025     07/07/2021    POTASSIUM 5.3 06/10/2025    POTASSIUM 4.6 03/07/2025    POTASSIUM 4.5 07/27/2022    POTASSIUM 4.7 07/07/2021    CHLORIDE 103 06/10/2025    CHLORIDE 106 07/27/2022    CHLORIDE 106 07/07/2021    CO2 26 06/10/2025    CO2 26 07/27/2022    CO2 25 07/07/2021    ANIONGAP 9 06/10/2025    ANIONGAP 6 07/27/2022    ANIONGAP 6 07/07/2021     (H) 06/10/2025     (H) 03/10/2025     (H) 07/27/2022     (H) 07/07/2021    BUN 23.0 06/10/2025    BUN 19 07/27/2022    BUN 26 07/07/2021    CR 1.08 06/10/2025    CR 0.79 07/07/2021    GFRESTIMATED 73 06/10/2025    GFRESTIMATED >90 07/07/2021    GFRESTBLACK >90 07/07/2021    YELENA 9.2 06/10/2025    YELENA 8.3 (L) 07/07/2021        INR RESULTS:  Lab Results    Component Value Date    INR 0.95 02/28/2025    INR 1.21 (H) 04/13/2024    INR 1.07 04/12/2024    INR 1.08 02/29/2024    INR 1.02 04/23/2021    INR 1.14 12/31/2018    INR 1.36 (H) 12/23/2018    INR 1.61 (H) 12/23/2018       Procedures:  PULMONARY FUNCTION TESTS:       Latest Ref Rng & Units 8/6/2024     6:06 AM   PFT   FVC L 2.80    FEV1 L 1.89    FVC% % 80    FEV1% % 70          ECHOCARDIOGRAM:   No results found for this or any previous visit (from the past 8760 hours).      Assessment and Plan:   1.  Permanent atrial fibrillation--we will try to further improve rate control by increasing beta-blocker from 12.5-25 and continue diltiazem 120 daily long-acting  2.  Patient will continue on Eliquis as before  3.  Recent hospitalization with no change in coronary artery status    Plan  1.  Send prescription to [facility] [address] for metoprolol 25 mg long-acting (XL)-3-month supply-4 refills.  Explained to patient that he should take a full tablet rather than half as he had been before  2.  Patient to continue other medications as is  3.  Follow-up 1 year with interim follow-up with internal medicine ()    Total elapsed time with chart review, clinic visit and documentation 40 minutes    I very much appreciated the opportunity to see and assess Loy Limon in the clinic today. Please do not hesitate to contact my office if you have any questions or concerns.      Rolly Plascencia MD  Cardiac Arrhythmia Service  [facility]  [phone]       CC  ROLLY PLASCENCIA

## 2025-06-17 NOTE — NURSING NOTE
Chief Complaint   Patient presents with    Follow Up      RETURN EP       Vitals were taken, medications reconciled and EKG performed.    Hali Malcolm, EMT    10:55 AM

## 2025-06-17 NOTE — LETTER
6/17/2025      RE: Loy Limon  [address]  [facility] [address]       Dear Colleague,    Thank you for the opportunity to participate in the care of your patient, Loy Limon, at the [facility] [place] [facility]. Please see a copy of my visit note below.    HPI: Mr. Limon is a pleasant 70-year-old male who is being seen today for management of atrial fibrillation.  A telephone-based  is present.    Patient has multiple comorbidities including hypertension, coronary artery disease with previous PCI,  biliary disease with recent coronary arteriography, revealed widely patent mid LAD stent and distal LAD lesion of 30% no interventions were performed.    Mr Limon did indicate that he had a period of time when he had some weakness in his left arm and left leg but this seems to have resolved.  He attributes it to having taken aspirin at the time but the history is unclear.  Physical examination does not reveal any evident carotid bruits.    At this clinic visit patient indicates that he feels well.  He denies any chest discomfort or excessive breathlessness or heart failure related symptoms.  He is not noting palpitations although ECG and Zio patch tend to show still inadequate control of his permanent atrial fibrillation.  We will increase his metoprolol from 12.5 to 25 as of this visit and send a prescription for that to his pharmacy.  He will remain on diltiazem at 120 daily.    Patient did have questions related to his ongoing home health care.  I indicated that he needed to deal with that service directly.    Absent any change in his overall clinical picture we will plan to see him in 1 years time and he will continue to be followed in internal medicine by Dr. Anne      PAST MEDICAL HISTORY:  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/28/2018     BPH  (benign prostatic hyperplasia)      Cancer (H)     hepatocellualr carcinoma     Cirrhosis of liver (H) 2018     Diabetes (H)      Enlarged prostate      Hypothyroidism      Impotence of organic origin 2020     Inguinal hernia     Repaired with mesh on 18     Liver lesion 2018     Liver transplanted (H) 2018     donor liver transplant     Portal vein thrombosis     on path explant     Postoperative atrial fibrillation (H) 2018     Prostate cancer (H)      TB lung, latent     Treated        CURRENT MEDICATIONS:  Current Outpatient Medications   Medication Sig Dispense Refill     apixaban ANTICOAGULANT (ELIQUIS) 5 MG tablet Take 1 tablet (5 mg) by mouth 2 times daily. 180 tablet 3     atorvastatin (LIPITOR) 40 MG tablet Take 1 tablet (40 mg) by mouth daily. 90 tablet 2     insulin glargine (LANTUS SOLOSTAR) 100 UNIT/ML pen Inject 20 Units subcutaneously at bedtime. 15 mL 1     levothyroxine (SYNTHROID/LEVOTHROID) 150 MCG tablet Take 1 tablet (150 mcg) by mouth daily. 90 tablet 3     metFORMIN (GLUCOPHAGE XR) 500 MG 24 hr tablet Take 2 tablets (1,000 mg) by mouth 2 times daily (with meals). 360 tablet 3     metoprolol succinate ER (TOPROL XL) 25 MG 24 hr tablet Take 1 tablet (25 mg) by mouth daily. 90 tablet 3     predniSONE (DELTASONE) 5 MG tablet Take 1 tablet (5 mg) by mouth daily. 90 tablet 1     Vitamin D3 (VITAMIN D, CHOLECALCIFEROL,) 25 mcg (1000 units) tablet Take 2,000 Units by mouth daily.       aspirin (ASA) 81 MG chewable tablet Take 1 tablet (81 mg) by mouth daily. (Patient not taking: Reported on 2025) 90 tablet 3     diltiazem ER COATED BEADS (CARDIZEM CD/CARTIA XT) 120 MG 24 hr capsule Take 1 capsule (120 mg) by mouth daily. (Patient not taking: Reported on 2025) 90 capsule 3     hydrALAZINE (APRESOLINE) 25 MG tablet Take 1 tablet (25 mg) by mouth 2 times daily. (Patient not taking: Reported on 2025) 180 tablet 3     mycophenolate (GENERIC  EQUIVALENT) 500 MG tablet Take 1 tablet (500 mg) by mouth 2 times daily (Patient not taking: Reported on 6/17/2025) 180 tablet 3       PAST SURGICAL HISTORY:  Past Surgical History:   Procedure Laterality Date     APPENDECTOMY       BRONCHOSCOPY (RIGID OR FLEXIBLE), DIAGNOSTIC N/A 03/05/2024    Procedure: BRONCHOSCOPY, WITH BRONCHOALVEOLAR LAVAGE;  Surgeon: Jimmy Farley MD;  Location:  GI     COLONOSCOPY      2018     CV CORONARY ANGIOGRAM N/A 10/13/2021    Procedure: CV CORONARY ANGIOGRAM;  Surgeon: Yaya Hampton MD;  Location:  HEART CARDIAC CATH LAB     CV CORONARY ANGIOGRAM N/A 3/9/2025    Procedure: Coronary Angiogram;  Surgeon: Randy Doss MD;  Location:  HEART CARDIAC CATH LAB     CV CORONARY LITHOTRIPSY PCI N/A 10/13/2021    Procedure: CV Coronary Lithotripsy PCI;  Surgeon: Yaya Hampton MD;  Location:  HEART CARDIAC CATH LAB     CV INSTANTANEOUS WAVE-FREE RATIO N/A 10/13/2021    Procedure: Instantaneous Wave-Free Ratio;  Surgeon: Yaya Hampton MD;  Location:  HEART CARDIAC CATH LAB     CV INTRAVASULAR ULTRASOUND N/A 10/13/2021    Procedure: Intravascular Ultrasound;  Surgeon: Yaya Hampton MD;  Location:  HEART CARDIAC CATH LAB     CV PCI N/A 3/9/2025    Procedure: Percutaneous Coronary Intervention;  Surgeon: Randy Doss MD;  Location:  HEART CARDIAC CATH LAB     CV PCI STENT DRUG ELUTING N/A 10/13/2021    Procedure: Percutaneous Coronary Intervention Stent Drug Eluting;  Surgeon: Yaya Hampton MD;  Location:  HEART CARDIAC CATH LAB     DAVINCI PROSTATECTOMY N/A 01/03/2020    Procedure: Robot-assisted laparoscopic radical prostatectomy, Bladder biopsy;  Surgeon: Kenrick Casiano MD;  Location: [facility]     ENDOSCOPIC RETROGRADE CHOLANGIOPANCREATOGRAM N/A 12/28/2018    Procedure: ENDOSCOPIC RETROGRADE CHOLANGIOPANCREATOGRAM, with Billary Sphincterotomy and Billary Stent  "Placement;  Surgeon: Omero Lawler MD;  Location: [facility] OR     ENDOSCOPIC RETROGRADE CHOLANGIOPANCREATOGRAM  2019    Procedure: COMBINED ENDOSCOPIC RETROGRADE CHOLANGIOPANCREATOGRAPHY, Bile Duct Stent Exchange;  Surgeon: Omero Lawler MD;  Location: [facility] OR     ENDOSCOPIC RETROGRADE CHOLANGIOPANCREATOGRAM N/A 2019    Procedure: ENDOSCOPIC RETROGRADE CHOLANGIOPANCREATOGRAPHY, WITH BILIARY STENT REMOVAL AND STONE EXTRACTION;  Surgeon: Omero Lawler MD;  Location: [facility] OR     HERNIORRHAPHY INGUINAL  2018    Procedure: Herniorrhaphy inguinal;  Surgeon: Emil Echevarria MD;  Location: [facility]     IR LIVER BIOPSY PERCUTANEOUS  2018     LAPAROTOMY EXPLORATORY      per the patient \"for infection in the LLQ\"      PICC INSERTION Right 2024    47 cm basilic     TRANSPLANT LIVER RECIPIENT  DONOR N/A 2018    Procedure: TRANSPLANT LIVER RECIPIENT  DONOR;  Surgeon: Emil Echevarria MD;  Location: [facility] OR       ALLERGIES:   No Known Allergies    FAMILY HISTORY:  Family History   Problem Relation Age of Onset     Memory loss Mother      Liver Disease Brother      Skin Cancer No family hx of      Melanoma No family hx of      SOCIAL HISTORY:  Social History     Tobacco Use     Smoking status: Former     Current packs/day: 0.00     Average packs/day: (1.4 ttl pk-yrs)     Types: Cigarettes, Cigars     Start date: 1970     Quit date: 3/14/2018     Years since quittin.2     Smokeless tobacco: Never     Tobacco comments:     Quit smoking 2018, one cigarette after dinner   Vaping Use     Vaping status: Never Used   Substance Use Topics     Alcohol use: No     Comment: Quit drinking 2018     Drug use: No       ROS:   Constitutional: No fever, chills, or sweats. Weight stable.   ENT: No visual disturbance, ear ache, epistaxis, sore throat.   Cardiovascular: As per HPI.   Respiratory: No cough, hemoptysis.    GI: No nausea, vomiting, Integument: " Negative.   Psychiatric: Negative.   Hematologic:  Easy bruising, no easy bleeding.  Neuro: Negative.   Endocrinology: No significant heat or cold intolerance   Musculoskeletal: No myalgia.    Exam:  There were no vitals taken for this visit.  GENERAL APPEARANCE: healthy, alert and no distress  HEENT: no icterus, no xanthelasmas, normal pupil size and reaction, normal palate, mucosa moist, no central cyanosis  NECK: no adenopathy, no asymmetry, masses, or scars, thyroid normal to palpation and no bruits, JVP not elevated  RESPIRATORY: lungs clear to auscultation - no rales, rhonchi or wheezes, no use of accessory muscles, no retractions, respirations are unlabored, normal respiratory rate  CARDIOVASCULAR:irregular rhythm, normal S1 with physiologic split S2,ABDOMEN: soft, non tender, without hepatosplenomegaly, no masses palpable, bowel sounds normal, aorta not enlarged by palpation, no abdominal bruits  EXTREMITIES: peripheral pulses normal, no edema, no bruits  NEURO: alert and oriented to person/place/time, normal speech, gait and affect  SKIN: no ecchymoses, no rashes    Labs:  CBC RESULTS:   Lab Results   Component Value Date    WBC 6.0 06/10/2025    WBC 7.0 07/07/2021    RBC 5.42 06/10/2025    RBC 4.83 07/07/2021    HGB 15.2 06/10/2025    HGB 14.0 07/07/2021    HCT 48.0 06/10/2025    HCT 40.1 07/07/2021    MCV 89 06/10/2025    MCV 83 07/07/2021    MCH 28.0 06/10/2025    MCH 29.0 07/07/2021    MCHC 31.7 06/10/2025    MCHC 34.9 07/07/2021    RDW 13.5 06/10/2025    RDW 12.2 07/07/2021     06/10/2025     07/07/2021       BMP RESULTS:  Lab Results   Component Value Date     06/10/2025     07/07/2021    POTASSIUM 5.3 06/10/2025    POTASSIUM 4.6 03/07/2025    POTASSIUM 4.5 07/27/2022    POTASSIUM 4.7 07/07/2021    CHLORIDE 103 06/10/2025    CHLORIDE 106 07/27/2022    CHLORIDE 106 07/07/2021    CO2 26 06/10/2025    CO2 26 07/27/2022    CO2 25 07/07/2021    ANIONGAP 9 06/10/2025    ANIONGAP 6  07/27/2022    ANIONGAP 6 07/07/2021     (H) 06/10/2025     (H) 03/10/2025     (H) 07/27/2022     (H) 07/07/2021    BUN 23.0 06/10/2025    BUN 19 07/27/2022    BUN 26 07/07/2021    CR 1.08 06/10/2025    CR 0.79 07/07/2021    GFRESTIMATED 73 06/10/2025    GFRESTIMATED >90 07/07/2021    GFRESTBLACK >90 07/07/2021    YELENA 9.2 06/10/2025    YELENA 8.3 (L) 07/07/2021        INR RESULTS:  Lab Results   Component Value Date    INR 0.95 02/28/2025    INR 1.21 (H) 04/13/2024    INR 1.07 04/12/2024    INR 1.08 02/29/2024    INR 1.02 04/23/2021    INR 1.14 12/31/2018    INR 1.36 (H) 12/23/2018    INR 1.61 (H) 12/23/2018       Procedures:  PULMONARY FUNCTION TESTS:       Latest Ref Rng & Units 8/6/2024     6:06 AM   PFT   FVC L 2.80    FEV1 L 1.89    FVC% % 80    FEV1% % 70          ECHOCARDIOGRAM:   No results found for this or any previous visit (from the past 8760 hours).      Assessment and Plan:   1.  Permanent atrial fibrillation--we will try to further improve rate control by increasing beta-blocker from 12.5-25 and continue diltiazem 120 daily long-acting  2.  Patient will continue on Eliquis as before  3.  Recent hospitalization with no change in coronary artery status    Plan  1.  [facility] 25 mg long-acting (XL)-3-month supply-4 refills.  Explained to patient that he should take a full tablet rather than half as he had been before  2.  Patient to continue other medications as is  3.  Follow-up 1 year with interim follow-up with internal medicine ()    Total elapsed time with chart review, clinic visit and documentation 40 minutes    I very much appreciated the opportunity to see and assess Loy Limon in the clinic today. Please do not hesitate to contact my office if you have any questions or concerns.      Surendra Plascencia MD  Cardiac Arrhythmia Service  [facility]  [phone]       CC  SURENDRA PLASCENCIA  JUN    Please do not hesitate to contact me if you have any questions/concerns.     Sincerely,     Rolly Plascencia MD

## 2025-06-17 NOTE — PATIENT INSTRUCTIONS
Plan:    I will increase his Toprol XL to 25mg daily  Follow up in 1 year.      Your Care Team:  [facility]   Telephone Number     Jacques Caballero RN [phone]    After business hours: [phone], ask for cardiologist on-call   Non-procedure scheduling:    Chelsi HERRERA   [phone]   Procedure scheduling:    Nai Nichole   [phone]   Device Clinic (Pacemakers, ICDs, Loop Recorders)    During business hours: [phone]  After business hours:   [phone]- select option 4 and ask for job code [phone].       Cardiovascular Clinic:   [address] [place] [address]. [place] [address]      As always, thank you for trusting us with your health care needs!    If you have further questions, please utilize [employer] to contact us.

## 2025-06-18 NOTE — PROGRESS NOTES
Assessment/Plan  (E11.9) Type 2 diabetes mellitus without retinopathy (H)  (primary encounter diagnosis)  Plan: Discussed findings with patient. Encouraged continued blood glucose, pressure, and lipid control. Patient should continue following recommendations of primary care provider. Patient should plan on returning to clinic annually for a dilated eye exam but was encouraged to return to clinic sooner with new flashes/floaters or other vision changes.     (H25.13) Senile nuclear sclerosis, bilateral  Comment: Approaching visual significance  Plan: Monitor only for now.     (H52.4) Presbyopia  Plan: REFRACTION [[id number]]        Discussed findings with patient. New spectacle prescription dispensed to patient. Patient is welcome to return to clinic with prolonged adaptation difficulties.       Complete documentation of historical and exam elements from today's encounter can  be found in the full encounter summary report (not reduplicated in this progress  note). I personally obtained the chief complaint(s) and history of present illness. I  confirmed and edited as necessary the review of systems, past medical/surgical  history, family history, social history, and examination findings as documented by  others; and I examined the patient myself. I personally reviewed the relevant tests,  images, and reports as documented above. I formulated and edited as necessary the  assessment and plan and discussed the findings and management plan with the  patient and family.    Jay Patel OD

## 2025-07-02 ENCOUNTER — OFFICE VISIT (OUTPATIENT)
Dept: INTERPRETER SERVICES | Facility: CLINIC | Age: 70
End: 2025-07-02
Payer: COMMERCIAL

## 2025-07-02 ENCOUNTER — APPOINTMENT (OUTPATIENT)
Dept: CT IMAGING | Facility: CLINIC | Age: 70
DRG: 065 | End: 2025-07-02
Attending: INTERNAL MEDICINE
Payer: COMMERCIAL

## 2025-07-02 ENCOUNTER — APPOINTMENT (OUTPATIENT)
Dept: MRI IMAGING | Facility: CLINIC | Age: 70
DRG: 065 | End: 2025-07-02
Attending: INTERNAL MEDICINE
Payer: COMMERCIAL

## 2025-07-02 ENCOUNTER — HOSPITAL ENCOUNTER (INPATIENT)
Facility: CLINIC | Age: 70
End: 2025-07-02
Attending: INTERNAL MEDICINE | Admitting: STUDENT IN AN ORGANIZED HEALTH CARE EDUCATION/TRAINING PROGRAM
Payer: COMMERCIAL

## 2025-07-02 ENCOUNTER — VIRTUAL VISIT (OUTPATIENT)
Dept: INTERPRETER SERVICES | Facility: CLINIC | Age: 70
End: 2025-07-02
Payer: COMMERCIAL

## 2025-07-02 DIAGNOSIS — R41.0 CONFUSION: ICD-10-CM

## 2025-07-02 DIAGNOSIS — E11.65 UNCONTROLLED TYPE 2 DIABETES MELLITUS WITH HYPERGLYCEMIA (H): ICD-10-CM

## 2025-07-02 DIAGNOSIS — I21.4 NSTEMI (NON-ST ELEVATED MYOCARDIAL INFARCTION) (H): ICD-10-CM

## 2025-07-02 DIAGNOSIS — E11.9 DIABETES MELLITUS, TYPE 2 (H): ICD-10-CM

## 2025-07-02 DIAGNOSIS — N17.9 AKI (ACUTE KIDNEY INJURY): ICD-10-CM

## 2025-07-02 DIAGNOSIS — I10 ESSENTIAL HYPERTENSION: ICD-10-CM

## 2025-07-02 DIAGNOSIS — I48.91 ATRIAL FIBRILLATION WITH RAPID VENTRICULAR RESPONSE (H): ICD-10-CM

## 2025-07-02 DIAGNOSIS — E03.9 HYPOTHYROIDISM, UNSPECIFIED TYPE: ICD-10-CM

## 2025-07-02 DIAGNOSIS — A41.9 SEPSIS, DUE TO UNSPECIFIED ORGANISM, UNSPECIFIED WHETHER ACUTE ORGAN DYSFUNCTION PRESENT (H): ICD-10-CM

## 2025-07-02 DIAGNOSIS — I63.9 ACUTE ISCHEMIC STROKE (H): ICD-10-CM

## 2025-07-02 DIAGNOSIS — Z98.890 STATUS POST CORONARY ANGIOGRAM: ICD-10-CM

## 2025-07-02 DIAGNOSIS — I25.118 CORONARY ARTERY DISEASE OF NATIVE ARTERY OF NATIVE HEART WITH STABLE ANGINA PECTORIS: ICD-10-CM

## 2025-07-02 DIAGNOSIS — K59.00 CONSTIPATION: ICD-10-CM

## 2025-07-02 DIAGNOSIS — R41.3 MEMORY LOSS: ICD-10-CM

## 2025-07-02 DIAGNOSIS — R09.02 HYPOXIA: ICD-10-CM

## 2025-07-02 DIAGNOSIS — R73.9 HYPERGLYCEMIA: ICD-10-CM

## 2025-07-02 DIAGNOSIS — C61 PROSTATE CANCER (H): ICD-10-CM

## 2025-07-02 LAB
ABO + RH BLD: NORMAL
ALBUMIN SERPL BCG-MCNC: 3.6 G/DL (ref 3.5–5.2)
ALBUMIN UR-MCNC: 200 MG/DL
ALP SERPL-CCNC: 137 U/L (ref 40–150)
ALT SERPL W P-5'-P-CCNC: 15 U/L (ref 0–70)
ANION GAP SERPL CALCULATED.3IONS-SCNC: 13 MMOL/L (ref 7–15)
APPEARANCE UR: CLEAR
AST SERPL W P-5'-P-CCNC: 19 U/L (ref 0–45)
ATRIAL RATE - MUSE: NORMAL BPM
BASE EXCESS BLDV CALC-SCNC: 1 MMOL/L (ref -3–3)
BASOPHILS # BLD AUTO: 0 10E3/UL (ref 0–0.2)
BASOPHILS NFR BLD AUTO: 1 %
BILIRUB SERPL-MCNC: 0.6 MG/DL
BILIRUB UR QL STRIP: NEGATIVE
BLD GP AB SCN SERPL QL: NEGATIVE
BUN SERPL-MCNC: 23.1 MG/DL (ref 8–23)
CALCIUM SERPL-MCNC: 9 MG/DL (ref 8.8–10.4)
CHLORIDE SERPL-SCNC: 103 MMOL/L (ref 98–107)
CHOLEST SERPL-MCNC: 135 MG/DL
COLOR UR AUTO: ABNORMAL
CREAT SERPL-MCNC: 1.17 MG/DL (ref 0.67–1.17)
DIASTOLIC BLOOD PRESSURE - MUSE: NORMAL MMHG
EGFRCR SERPLBLD CKD-EPI 2021: 66 ML/MIN/1.73M2
EOSINOPHIL # BLD AUTO: 0.1 10E3/UL (ref 0–0.7)
EOSINOPHIL NFR BLD AUTO: 2 %
ERYTHROCYTE [DISTWIDTH] IN BLOOD BY AUTOMATED COUNT: 13.2 % (ref 10–15)
EST. AVERAGE GLUCOSE BLD GHB EST-MCNC: 203 MG/DL
GLUCOSE BLDC GLUCOMTR-MCNC: 118 MG/DL (ref 70–99)
GLUCOSE BLDC GLUCOMTR-MCNC: 250 MG/DL (ref 70–99)
GLUCOSE BLDC GLUCOMTR-MCNC: 265 MG/DL (ref 70–99)
GLUCOSE SERPL-MCNC: 274 MG/DL (ref 70–99)
GLUCOSE UR STRIP-MCNC: NEGATIVE MG/DL
HBA1C MFR BLD: 8.7 %
HCO3 BLDV-SCNC: 27 MMOL/L (ref 21–28)
HCO3 SERPL-SCNC: 22 MMOL/L (ref 22–29)
HCT VFR BLD AUTO: 49.1 % (ref 40–53)
HDLC SERPL-MCNC: 55 MG/DL
HGB BLD-MCNC: 15.8 G/DL (ref 13.3–17.7)
HGB UR QL STRIP: ABNORMAL
IMM GRANULOCYTES # BLD: 0 10E3/UL
IMM GRANULOCYTES NFR BLD: 0 %
INR PPP: 1.09 (ref 0.85–1.15)
INTERPRETATION ECG - MUSE: NORMAL
KETONES UR STRIP-MCNC: NEGATIVE MG/DL
LACTATE BLD-SCNC: 2.4 MMOL/L (ref 0.7–2)
LACTATE SERPL-SCNC: 1.3 MMOL/L (ref 0.7–2)
LDLC SERPL CALC-MCNC: 48 MG/DL
LEUKOCYTE ESTERASE UR QL STRIP: NEGATIVE
LYMPHOCYTES # BLD AUTO: 1.3 10E3/UL (ref 0.8–5.3)
LYMPHOCYTES NFR BLD AUTO: 20 %
MCH RBC QN AUTO: 28.7 PG (ref 26.5–33)
MCHC RBC AUTO-ENTMCNC: 32.2 G/DL (ref 31.5–36.5)
MCV RBC AUTO: 89 FL (ref 78–100)
MONOCYTES # BLD AUTO: 0.4 10E3/UL (ref 0–1.3)
MONOCYTES NFR BLD AUTO: 6 %
NEUTROPHILS # BLD AUTO: 4.5 10E3/UL (ref 1.6–8.3)
NEUTROPHILS NFR BLD AUTO: 71 %
NITRATE UR QL: NEGATIVE
NONHDLC SERPL-MCNC: 80 MG/DL
NRBC # BLD AUTO: 0 10E3/UL
NRBC BLD AUTO-RTO: 0 /100
P AXIS - MUSE: NORMAL DEGREES
PCO2 BLDV: 47 MM HG (ref 40–50)
PH BLDV: 7.36 [PH] (ref 7.32–7.43)
PH UR STRIP: 7 [PH] (ref 5–7)
PLATELET # BLD AUTO: 222 10E3/UL (ref 150–450)
PO2 BLDV: 38 MM HG (ref 25–47)
POTASSIUM SERPL-SCNC: 4.4 MMOL/L (ref 3.4–5.3)
PR INTERVAL - MUSE: NORMAL MS
PROT SERPL-MCNC: 6.6 G/DL (ref 6.4–8.3)
PROTHROMBIN TIME: 14.1 SECONDS (ref 11.8–14.8)
QRS DURATION - MUSE: 94 MS
QT - MUSE: 394 MS
QTC - MUSE: 460 MS
R AXIS - MUSE: -16 DEGREES
RADIOLOGIST FLAGS: ABNORMAL
RADIOLOGIST FLAGS: ABNORMAL
RBC # BLD AUTO: 5.5 10E6/UL (ref 4.4–5.9)
RBC URINE: 3 /HPF
SAO2 % BLDV: 70 % (ref 70–75)
SODIUM SERPL-SCNC: 138 MMOL/L (ref 135–145)
SP GR UR STRIP: 1.01 (ref 1–1.03)
SPECIMEN EXP DATE BLD: NORMAL
SYSTOLIC BLOOD PRESSURE - MUSE: NORMAL MMHG
T AXIS - MUSE: 139 DEGREES
TRIGL SERPL-MCNC: 158 MG/DL
TROPONIN T SERPL HS-MCNC: 29 NG/L
TROPONIN T SERPL HS-MCNC: 30 NG/L
TROPONIN T SERPL HS-MCNC: 32 NG/L
UROBILINOGEN UR STRIP-MCNC: NORMAL MG/DL
VENTRICULAR RATE- MUSE: 82 BPM
WBC # BLD AUTO: 6.4 10E3/UL (ref 4–11)
WBC URINE: 1 /HPF

## 2025-07-02 PROCEDURE — 82465 ASSAY BLD/SERUM CHOLESTEROL: CPT

## 2025-07-02 PROCEDURE — 83036 HEMOGLOBIN GLYCOSYLATED A1C: CPT

## 2025-07-02 PROCEDURE — 70551 MRI BRAIN STEM W/O DYE: CPT | Mod: 26 | Performed by: STUDENT IN AN ORGANIZED HEALTH CARE EDUCATION/TRAINING PROGRAM

## 2025-07-02 PROCEDURE — 85610 PROTHROMBIN TIME: CPT | Performed by: INTERNAL MEDICINE

## 2025-07-02 PROCEDURE — 82803 BLOOD GASES ANY COMBINATION: CPT

## 2025-07-02 PROCEDURE — 36415 COLL VENOUS BLD VENIPUNCTURE: CPT | Performed by: INTERNAL MEDICINE

## 2025-07-02 PROCEDURE — 70498 CT ANGIOGRAPHY NECK: CPT | Mod: 26 | Performed by: RADIOLOGY

## 2025-07-02 PROCEDURE — 81001 URINALYSIS AUTO W/SCOPE: CPT | Performed by: INTERNAL MEDICINE

## 2025-07-02 PROCEDURE — 70496 CT ANGIOGRAPHY HEAD: CPT | Mod: 26 | Performed by: RADIOLOGY

## 2025-07-02 PROCEDURE — 84484 ASSAY OF TROPONIN QUANT: CPT

## 2025-07-02 PROCEDURE — 36415 COLL VENOUS BLD VENIPUNCTURE: CPT

## 2025-07-02 PROCEDURE — 80053 COMPREHEN METABOLIC PANEL: CPT | Performed by: INTERNAL MEDICINE

## 2025-07-02 PROCEDURE — 70551 MRI BRAIN STEM W/O DYE: CPT

## 2025-07-02 PROCEDURE — T1013 SIGN LANG/ORAL INTERPRETER: HCPCS

## 2025-07-02 PROCEDURE — 250N000013 HC RX MED GY IP 250 OP 250 PS 637

## 2025-07-02 PROCEDURE — 70450 CT HEAD/BRAIN W/O DYE: CPT

## 2025-07-02 PROCEDURE — 82962 GLUCOSE BLOOD TEST: CPT

## 2025-07-02 PROCEDURE — 250N000012 HC RX MED GY IP 250 OP 636 PS 637

## 2025-07-02 PROCEDURE — 83605 ASSAY OF LACTIC ACID: CPT

## 2025-07-02 PROCEDURE — 85025 COMPLETE CBC W/AUTO DIFF WBC: CPT | Performed by: INTERNAL MEDICINE

## 2025-07-02 PROCEDURE — T1013 SIGN LANG/ORAL INTERPRETER: HCPCS | Mod: GT,TEL,95

## 2025-07-02 PROCEDURE — 84484 ASSAY OF TROPONIN QUANT: CPT | Performed by: INTERNAL MEDICINE

## 2025-07-02 PROCEDURE — 120N000002 HC R&B MED SURG/OB UMMC

## 2025-07-02 PROCEDURE — 250N000011 HC RX IP 250 OP 636: Performed by: INTERNAL MEDICINE

## 2025-07-02 PROCEDURE — 70496 CT ANGIOGRAPHY HEAD: CPT

## 2025-07-02 PROCEDURE — 86900 BLOOD TYPING SEROLOGIC ABO: CPT | Performed by: INTERNAL MEDICINE

## 2025-07-02 PROCEDURE — 99222 1ST HOSP IP/OBS MODERATE 55: CPT | Mod: GC | Performed by: STUDENT IN AN ORGANIZED HEALTH CARE EDUCATION/TRAINING PROGRAM

## 2025-07-02 RX ORDER — LABETALOL HYDROCHLORIDE 5 MG/ML
10 INJECTION, SOLUTION INTRAVENOUS EVERY 10 MIN PRN
Status: ACTIVE | OUTPATIENT
Start: 2025-07-02

## 2025-07-02 RX ORDER — MYCOPHENOLATE MOFETIL 500 MG/1
500 TABLET ORAL 2 TIMES DAILY
Status: DISPENSED | OUTPATIENT
Start: 2025-07-02

## 2025-07-02 RX ORDER — DEXTROSE MONOHYDRATE 25 G/50ML
25-50 INJECTION, SOLUTION INTRAVENOUS
Status: ACTIVE | OUTPATIENT
Start: 2025-07-02

## 2025-07-02 RX ORDER — HYDRALAZINE HYDROCHLORIDE 20 MG/ML
10 INJECTION INTRAMUSCULAR; INTRAVENOUS EVERY 30 MIN PRN
Status: ACTIVE | OUTPATIENT
Start: 2025-07-02

## 2025-07-02 RX ORDER — PREDNISONE 5 MG/1
5 TABLET ORAL DAILY
Status: DISPENSED | OUTPATIENT
Start: 2025-07-03

## 2025-07-02 RX ORDER — ATORVASTATIN CALCIUM 40 MG/1
40 TABLET, FILM COATED ORAL DAILY
Status: DISPENSED | OUTPATIENT
Start: 2025-07-02

## 2025-07-02 RX ORDER — METOPROLOL SUCCINATE 25 MG/1
25 TABLET, EXTENDED RELEASE ORAL DAILY
Status: DISPENSED | OUTPATIENT
Start: 2025-07-02

## 2025-07-02 RX ORDER — NICOTINE POLACRILEX 4 MG
15-30 LOZENGE BUCCAL
Status: ACTIVE | OUTPATIENT
Start: 2025-07-02

## 2025-07-02 RX ORDER — IOPAMIDOL 755 MG/ML
67 INJECTION, SOLUTION INTRAVASCULAR ONCE
Status: COMPLETED | OUTPATIENT
Start: 2025-07-02 | End: 2025-07-02

## 2025-07-02 RX ORDER — LEVOTHYROXINE SODIUM 150 UG/1
150 TABLET ORAL DAILY
Status: DISPENSED | OUTPATIENT
Start: 2025-07-02

## 2025-07-02 RX ORDER — DILTIAZEM HYDROCHLORIDE 120 MG/1
120 CAPSULE, COATED, EXTENDED RELEASE ORAL DAILY
Status: DISPENSED | OUTPATIENT
Start: 2025-07-03

## 2025-07-02 RX ORDER — LIDOCAINE 40 MG/G
CREAM TOPICAL
Status: ACTIVE | OUTPATIENT
Start: 2025-07-02

## 2025-07-02 RX ORDER — HYDRALAZINE HYDROCHLORIDE 25 MG/1
25 TABLET, FILM COATED ORAL 2 TIMES DAILY
Status: DISPENSED | OUTPATIENT
Start: 2025-07-03

## 2025-07-02 RX ORDER — HYDRALAZINE HYDROCHLORIDE 25 MG/1
25 TABLET, FILM COATED ORAL 2 TIMES DAILY
Status: DISCONTINUED | OUTPATIENT
Start: 2025-07-02 | End: 2025-07-02

## 2025-07-02 RX ADMIN — INSULIN GLARGINE 20 UNITS: 100 INJECTION, SOLUTION SUBCUTANEOUS at 22:56

## 2025-07-02 RX ADMIN — MYCOPHENOLATE MOFETIL 500 MG: 500 TABLET, FILM COATED ORAL at 22:56

## 2025-07-02 RX ADMIN — APIXABAN 5 MG: 5 TABLET, FILM COATED ORAL at 20:29

## 2025-07-02 RX ADMIN — IOPAMIDOL 67 ML: 755 INJECTION, SOLUTION INTRAVENOUS at 09:33

## 2025-07-02 RX ADMIN — ATORVASTATIN CALCIUM 40 MG: 40 TABLET, FILM COATED ORAL at 18:50

## 2025-07-02 RX ADMIN — LEVOTHYROXINE SODIUM 150 MCG: 0.07 TABLET ORAL at 18:50

## 2025-07-02 RX ADMIN — METOPROLOL SUCCINATE 25 MG: 25 TABLET, EXTENDED RELEASE ORAL at 18:50

## 2025-07-02 RX ADMIN — INSULIN ASPART 1 UNITS: 100 INJECTION, SOLUTION INTRAVENOUS; SUBCUTANEOUS at 22:56

## 2025-07-02 ASSESSMENT — ACTIVITIES OF DAILY LIVING (ADL)
ADLS_ACUITY_SCORE: 57

## 2025-07-02 NOTE — H&P
"[facility]    Stroke Admission Note    Chief Complaint  Intermittent headache, blurry vision, extremity weakness    HPI  Loy Limon is a 70 year old male with a past medical history significant for HTN, CAD, N-STEMI in 3/2025, a-fib with RVR on Eliquis, T2DM on long-lasting insulin, prostate cancer, cirrhosis of liver s/p liver transplant in 2018, and former tobacco use, who presented to the ED today after experiencing intermittent headache, blurry vision, and temporary changes in speech occurring in the last 2-3 weeks. He states that over the past two days, the headache at the back of his head has increased in intensity, has had dizziness, and altered sensory changes in his fingers and feet. The patient states that he initially felt like he was unable to move his arms or legs and \"his body was not responding\", but states that this resolved after about 3 minutes and he was able to go sit down with assist from his wife.  He states that he feels very unstable when walking and is afraid to drive because of his symptoms.  The patient states he also has episodes where he does not know where he is and feels like \"his mind goes away\" for a few seconds and is unable to communicate. During these episodes, he reports that his tongue \"felt heavy\" and his wife, at bedside, said he sounded slightly slurred for only a few minutes.     A stroke code was not called when he arrived to the ED this morning due to the timing of onset of these symptoms, however a consult was placed after imaging revealed findings indicative of subacute infarct.    NIHSS score of 0 at this time. BP has been elevated in the 180s/100s. Patient has been in A-Fib on telemetry with HR in the 90s. CT Head wo contrast showed right occipital lobe subacute-chronic infarct, but no acute intracranial hemorrhage. CTA head and neck with contrast showed new occlusion of right internal carotid artery with " collateral filling of the right MCA, as well as stenosis of other intracranial vessels. Patient to be admitted for further workup since patient endorses Eliquis compliance and not missing any doses.     Patient was accompanied by wife at bedside.  utilized for gathering history.    Intravenous Thrombolysis  Not given due to:   - unclear or unfavorable risk-benefit profile for extended window thrombolysis beyond the conventional 4.5 hour time window    Endovascular Treatment  Not initiated due to absence of proximal vessel occlusion    Impression   Loy Limon is a 70 year old male with a past medical history significant for HTN, CAD, N-STEMI in 3/2025, a-fib with RVR on Eliquis, T2DM on long-lasting insulin, prostate cancer, cirrhosis of liver s/p liver transplant in 2018, and former tobacco use, who presented to the ED today after experiencing intermittent headache, blurry vision, and temporary changes in speech occurring in the last 2-3 weeks. On exam NIH is 0 with no focal deficits. On CT head, subacute ischemic stroke of right occipital lobe was found, and CTA head and neck indicated complete occlusion of R ICA. Waiting for final read of MRI brain.    Given the histories of CAD, prior cardiac N-STEMI, and atherosclerosis on imaging, likely etiology is of atherosclerotic nature, however cannot rule out other underlying causes including malignancy since patient endorses Eliquis compliance and has history of malignancies.    Plan  Admit for Subacute Ischemic Stroke  MRI Brain and Neck wwo contrast  Cardiac CTA  CT Abdomen and Pelvis  Continue Eliquis 5mg BID  TTE  Lipid Panel  A1c        Prophylaxis            For VTE Prevention:  - patient on Eliquis    For Acid Suppression:  - GI prophylaxis is not indicated  -      Code Status  Full Code    During initial physical assessment, the plan of care was discussed and developed with patient and family.  Plan of care includes: full  "resuscitation.    Patient was admitted via direct admit    The patient will be admitted to the Endovascular Surgical Neuroradiology team..     The patient was discussed with Stroke Staff, Dr. Razo.    Josie Ochoa  Neurology Resident, PGY-1  ___________________________________________________    Nutrition: Regular Diet    Clinically Significant Risk Factors Present on Admission                # Drug Induced Coagulation Defect: home medication list includes an anticoagulant medication  # Drug Induced Platelet Defect: home medication list includes an antiplatelet medication   # Hypertension: Noted on problem list          # DMII: A1C = 8.6 % (Ref range: <=5.7 %) within past 6 months    # Obesity: Estimated body mass index is 33.16 kg/m  as calculated from the following:    Height as of 6/10/25: 1.626 m (5' 4\").    Weight as of 25: 87.6 kg (193 lb 3.2 oz).       # Financial/Environmental Concerns:          Past Medical History   Past Medical History:   Diagnosis Date    Anemia     Biliary stricture of transplanted liver (H) 2018    BPH (benign prostatic hyperplasia)     Cancer (H)     hepatocellualr carcinoma    Cirrhosis of liver (H) 2018    Diabetes (H)     Enlarged prostate     Hypothyroidism     Impotence of organic origin 2020    Inguinal hernia     Repaired with mesh on 18    Liver lesion 2018    Liver transplanted (H) 2018     donor liver transplant    Portal vein thrombosis     on path explant    Postoperative atrial fibrillation (H) 2018    Prostate cancer (H)     TB lung, latent     Treated      Past Surgical History   Past Surgical History:   Procedure Laterality Date    APPENDECTOMY      BRONCHOSCOPY (RIGID OR FLEXIBLE), DIAGNOSTIC N/A 2024    Procedure: BRONCHOSCOPY, WITH BRONCHOALVEOLAR LAVAGE;  Surgeon: Jimmy Farley MD;  Location: [facility]    COLONOSCOPY          CV CORONARY ANGIOGRAM N/A 10/13/2021    Procedure: CV CORONARY ANGIOGRAM;  " Surgeon: Yaya Hampton MD;  Location:  HEART CARDIAC CATH LAB    CV CORONARY ANGIOGRAM N/A 3/9/2025    Procedure: Coronary Angiogram;  Surgeon: Randy Doss MD;  Location: [facility] CARDIAC CATH LAB    CV CORONARY LITHOTRIPSY PCI N/A 10/13/2021    Procedure: CV Coronary Lithotripsy PCI;  Surgeon: Yaya Hampton MD;  Location:  HEART CARDIAC CATH LAB    CV INSTANTANEOUS WAVE-FREE RATIO N/A 10/13/2021    Procedure: Instantaneous Wave-Free Ratio;  Surgeon: Yaya Hampton MD;  Location:  HEART CARDIAC CATH LAB    CV INTRAVASULAR ULTRASOUND N/A 10/13/2021    Procedure: Intravascular Ultrasound;  Surgeon: Yaya Hampton MD;  Location: [facility] CARDIAC CATH LAB    CV PCI N/A 3/9/2025    Procedure: Percutaneous Coronary Intervention;  Surgeon: Randy Doss MD;  Location:  HEART CARDIAC CATH LAB    CV PCI STENT DRUG ELUTING N/A 10/13/2021    Procedure: Percutaneous Coronary Intervention Stent Drug Eluting;  Surgeon: Yaya Hampton MD;  Location: [facility] CARDIAC CATH LAB    DAVINCI PROSTATECTOMY N/A 01/03/2020    Procedure: Robot-assisted laparoscopic radical prostatectomy, Bladder biopsy;  Surgeon: Kenrick Casiano MD;  Location:  OR    ENDOSCOPIC RETROGRADE CHOLANGIOPANCREATOGRAM N/A 12/28/2018    Procedure: ENDOSCOPIC RETROGRADE CHOLANGIOPANCREATOGRAM, with Billary Sphincterotomy and Billary Stent Placement;  Surgeon: Omero Lawler MD;  Location:  OR    ENDOSCOPIC RETROGRADE CHOLANGIOPANCREATOGRAM  02/18/2019    Procedure: COMBINED ENDOSCOPIC RETROGRADE CHOLANGIOPANCREATOGRAPHY, Bile Duct Stent Exchange;  Surgeon: Omero Lawler MD;  [facility]    ENDOSCOPIC RETROGRADE CHOLANGIOPANCREATOGRAM N/A 04/29/2019    Procedure: ENDOSCOPIC RETROGRADE CHOLANGIOPANCREATOGRAPHY, WITH BILIARY STENT REMOVAL AND STONE EXTRACTION;  Surgeon: Omero Lawler MD;  Location:  OR     "HERNIORRHAPHY INGUINAL  2018    Procedure: Herniorrhaphy inguinal;  Surgeon: Emil Echevarria MD;  Location: [facility] OR    IR LIVER BIOPSY PERCUTANEOUS  2018    LAPAROTOMY EXPLORATORY      per the patient \"for infection in the LLQ\"     PICC INSERTION Right 2024    47 cm basilic    TRANSPLANT LIVER RECIPIENT  DONOR N/A 2018    Procedure: TRANSPLANT LIVER RECIPIENT  DONOR;  Surgeon: Emil Echevarria MD;  Location: [facility] OR     Medications   Home Meds  Prior to Admission medications    Medication Sig Start Date End Date Taking? Authorizing Provider   apixaban ANTICOAGULANT (ELIQUIS) 5 MG tablet Take 1 tablet (5 mg) by mouth 2 times daily. 6/10/25   Jaswinder Anne MD   aspirin (ASA) 81 MG chewable tablet Take 1 tablet (81 mg) by mouth daily.  Patient not taking: Reported on 2025 3/11/25   Cora Augustine APRN CNP   atorvastatin (LIPITOR) 40 MG tablet Take 1 tablet (40 mg) by mouth daily. 25   Leia Edward PA-C   diltiazem ER COATED BEADS (CARDIZEM CD/CARTIA XT) 120 MG 24 hr capsule Take 1 capsule (120 mg) by mouth daily.  Patient not taking: Reported on 2025 3/10/25   Cora Augustine APRN CNP   hydrALAZINE (APRESOLINE) 25 MG tablet Take 1 tablet (25 mg) by mouth 2 times daily.  Patient not taking: Reported on 2025   Leia Edward PA-C   insulin glargine (LANTUS SOLOSTAR) 100 UNIT/ML pen Inject 20 Units subcutaneously at bedtime. 25   Jaswinder Anne MD   levothyroxine (SYNTHROID/LEVOTHROID) 150 MCG tablet Take 1 tablet (150 mcg) by mouth daily. 25   Leia Edward PA-C   metFORMIN (GLUCOPHAGE XR) 500 MG 24 hr tablet Take 2 tablets (1,000 mg) by mouth 2 times daily (with meals). 10/7/24   Sue Tavares MD   metoprolol succinate ER (TOPROL XL) 25 MG 24 hr tablet Take 1 tablet (25 mg) by mouth daily. 25   Rolly Plascencia MD   mycophenolate (GENERIC EQUIVALENT) 500 MG tablet Take 1 tablet (500 " mg) by mouth 2 times daily  Patient not taking: Reported on 2025 8/15/24   Izabela Galvan MD   predniSONE (DELTASONE) 5 MG tablet Take 1 tablet (5 mg) by mouth daily. 3/12/25   Jaswinder Anne MD   Vitamin D3 (VITAMIN D, CHOLECALCIFEROL,) 25 mcg (1000 units) tablet Take 2,000 Units by mouth daily.    Reported, Patient       Scheduled Meds  No current facility-administered medications for this encounter.       Infusion Meds  No current facility-administered medications for this encounter.       PRN Meds  No current facility-administered medications for this encounter.       Allergies   No Known Allergies  Family History   Family History   Problem Relation Age of Onset    Memory loss Mother     Liver Disease Brother     Skin Cancer No family hx of     Melanoma No family hx of      Social History   Social History     Tobacco Use    Smoking status: Former     Current packs/day: 0.00     Average packs/day: (1.4 ttl pk-yrs)     Types: Cigarettes, Cigars     Start date: 1970     Quit date: 3/14/2018     Years since quittin.3    Smokeless tobacco: Never    Tobacco comments:     Quit smoking 2018, one cigarette after dinner   Vaping Use    Vaping status: Never Used   Substance Use Topics    Alcohol use: No     Comment: Quit drinking 2018    Drug use: No       Review of Systems   CONSTITUTIONAL: NEGATIVE for fever, chills, change in weight  ENT/MOUTH: NEGATIVE for ear, mouth and throat problems  RESP: NEGATIVE for significant cough or SOB  CV: NEGATIVE for chest pain, palpitations or peripheral edema       PHYSICAL EXAMINATION  Temp:  [98  F (36.7  C)-98.3  F (36.8  C)] 98.3  F (36.8  C)  Pulse:  [] 93  Resp:  [18] 18  BP: ()/() 163/120  SpO2:  [97 %-98 %] 97 %    General:  patient lying in bed without any acute distress    HEENT:  normocephalic/atraumatic, no oral lesions, no epistaxis   Cardio:  irregularly irregular  Pulmonary:  no respiratory distress  Abdomen:   non-distended  Extremities:  no edema  Skin:  intact, warm/dry     Neurologic  Mental Status:  alert, oriented x 3, follows commands, speech clear and fluent, naming and repetition normal  Cranial Nerves:  visual fields intact, PERRL, EOMI with normal smooth pursuit, facial sensation intact and symmetric, facial movements symmetric, hearing not formally tested but intact to conversation, palate elevation symmetric and uvula midline, no dysarthria, shoulder shrug strong bilaterally, tongue protrusion midline  Motor:  normal muscle tone and bulk, no abnormal movements, able to move all limbs spontaneously, strength 5/5 throughout upper and lower extremities, no pronator drift  Reflexes:  toes down-going  Sensory:  light touch sensation intact and symmetric throughout upper and lower extremities, no extinction on double simultaneous stimulation   Coordination:  normal finger-to-nose and heel-to-shin bilaterally without dysmetria  Station/Gait:  deferred    Dysphagia Screen  Per Nursing    Stroke Scales    NIHSS  1a. Level of Consciousness 0-->Alert, keenly responsive   1b. LOC Questions 0-->Answers both questions correctly   1c. LOC Commands 0-->Performs both tasks correctly   2.   Best Gaze 0-->Normal   3.   Visual 0-->No visual loss   4.   Facial Palsy 0-->Normal symmetrical movements   5a. Motor Arm, Left 0-->No drift, limb holds 90 (or 45) degrees for full 10 secs   5b. Motor Arm, Right 0-->No drift, limb holds 90 (or 45) degrees for full 10 secs   6a. Motor Leg, Left 0-->No drift, leg holds 30 degree position for full 5 secs   6b. Motor Leg, right 0-->No drift, leg holds 30 degree position for full 5 secs   7.   Limb Ataxia 0-->Absent   8.   Sensory 0-->Normal, no sensory loss   9.   Best Language 0-->No aphasia, normal   10. Dysarthria 0-->Normal   11. Extinction and Inattention  0-->No abnormality   Total 0 (07/02/25 1846)       Modified Marilin Score (Pre-morbid)  1-No significant disability despite  symptoms    Imaging  I personally reviewed all imaging; relevant findings per the HPI.    Lab Results Data   CBC  Recent Labs   Lab 07/02/25  0829   WBC 6.4   RBC 5.50   HGB 15.8   HCT 49.1        Basic Metabolic Panel   Recent Labs   Lab 07/02/25  1559 07/02/25  0829 07/02/25  0759   NA  --  138  --    POTASSIUM  --  4.4  --    CHLORIDE  --  103  --    CO2  --  22  --    BUN  --  23.1*  --    CR  --  1.17  --    * 274* 250*   YELENA  --  9.0  --      Liver Panel  Recent Labs   Lab 07/02/25  0829   PROTTOTAL 6.6   ALBUMIN 3.6   BILITOTAL 0.6   ALKPHOS 137   AST 19   ALT 15     INR    Recent Labs   Lab Test 07/02/25  0829 02/28/25  0816 04/13/24  0557   INR 1.09 0.95 1.21*      Lipid Profile    Recent Labs   Lab Test 08/13/24  1509 07/19/23  1411 01/25/22  0851   CHOL 146 153 158   HDL 73 61 57   LDL 53 58 74   TRIG 102 170* 136     A1C    Recent Labs   Lab Test 06/03/25  0851 02/18/25  0810 09/20/24  1415   A1C 8.6* 8.4* 10.6*     Troponin    Recent Labs   Lab 07/02/25  1020 07/02/25  0829   CTROPT 29* 32*          Stroke Code / Stroke Consult Data Data    Not a stroke code

## 2025-07-02 NOTE — ED TRIAGE NOTES
PT arrives c/o 2 days of intermittent HA and dizziness starting 2 days ago, worsening yesterday. Blurry vision starting 2 weeks ago.    Hx. Atrial fib, Nstemi, DM2    BP 82/52 pulse 101, blood sugar 250 in triage.      Triage Assessment (Adult)       Row Name 07/02/25 0757          Triage Assessment    Airway WDL WDL        Respiratory WDL    Respiratory WDL WDL        Skin Circulation/Temperature WDL    Skin Circulation/Temperature WDL WDL        Cardiac WDL    Cardiac WDL X     Cardiac Rhythm Atrial fibrillation        Peripheral/Neurovascular WDL    Peripheral Neurovascular WDL WDL        Cognitive/Neuro/Behavioral WDL    Cognitive/Neuro/Behavioral WDL WDL

## 2025-07-02 NOTE — ED PROVIDER NOTES
"    [place] EMERGENCY DEPARTMENT ([facility])    25       ED PROVIDER NOTE     History     Chief Complaint   Patient presents with    Dizziness    Hypotension     The history is provided by the patient and medical records. The history is limited by a language barrier. A  was used (Polish).     Loy Limon is a 70 year old male with a history of atrial fibrillation with RVR on Eliquis, hypertension, CAD, NSTEMI,  type II diabetes, prostate cancer, cirrhosis s/p liver transplant, tobacco use disorder, who presents to the ED for evaluation of headache, dizziness, and blurry vision. The patient states that he became very diaphoretic and began experiencing a headache, dizziness, and blurry vision at approximately 2100 on 25.  These symptoms worsened significantly yesterday. The patient states that he initially felt like he was unable to move his arms or legs and \"his body was not responding\", but states that this resolved after about 3 minutes and he was able to go sit down with assist from his wife.  He states that he feels very unstable when walking and is afraid to drive because of his symptoms.  The patient states he also has episodes where he does not know where he is and feels like \"his mind goes away\" for a few seconds.  He denies any fever or vomiting.    Past Medical History  Past Medical History:   Diagnosis Date    Anemia     Biliary stricture of transplanted liver (H) 2018    BPH (benign prostatic hyperplasia)     Cancer (H)     hepatocellualr carcinoma    Cirrhosis of liver (H) 2018    Diabetes (H)     Enlarged prostate     Hypothyroidism     Impotence of organic origin 2020    Inguinal hernia     Repaired with mesh on 18    Liver lesion 2018    Liver transplanted (H) 2018     donor liver transplant    Portal vein thrombosis     on path explant    Postoperative atrial fibrillation (H) 2018    Prostate cancer (H)  "    TB lung, latent     Treated      Past Surgical History:   Procedure Laterality Date    APPENDECTOMY      BRONCHOSCOPY (RIGID OR FLEXIBLE), DIAGNOSTIC N/A 03/05/2024    Procedure: BRONCHOSCOPY, WITH BRONCHOALVEOLAR LAVAGE;  Surgeon: Jimmy Farley MD;  Location:  GI    COLONOSCOPY      2018    CV CORONARY ANGIOGRAM N/A 10/13/2021    Procedure: CV CORONARY ANGIOGRAM;  Surgeon: Yaya Hampton MD;  Location:  HEART CARDIAC CATH LAB    CV CORONARY ANGIOGRAM N/A 3/9/2025    Procedure: Coronary Angiogram;  Surgeon: Randy Doss MD;  Location:  HEART CARDIAC CATH LAB    CV CORONARY LITHOTRIPSY PCI N/A 10/13/2021    Procedure: CV Coronary Lithotripsy PCI;  Surgeon: Yaya Hampton MD;  Location: [facility] CARDIAC CATH LAB    CV INSTANTANEOUS WAVE-FREE RATIO N/A 10/13/2021    Procedure: Instantaneous Wave-Free Ratio;  Surgeon: Yaya Hampton MD;  Location: [facility] CARDIAC CATH LAB    CV INTRAVASULAR ULTRASOUND N/A 10/13/2021    Procedure: Intravascular Ultrasound;  Surgeon: Yaya Hampton MD;  Location:  HEART CARDIAC CATH LAB    CV PCI N/A 3/9/2025    Procedure: Percutaneous Coronary Intervention;  Surgeon: Randy Doss MD;  Location: [facility] CARDIAC CATH LAB    CV PCI STENT DRUG ELUTING N/A 10/13/2021    Procedure: Percutaneous Coronary Intervention Stent Drug Eluting;  Surgeon: Yaya Hampton MD;  Location: [facility] CARDIAC CATH LAB    DAVINCI PROSTATECTOMY N/A 01/03/2020    Procedure: Robot-assisted laparoscopic radical prostatectomy, Bladder biopsy;  Surgeon: Kenrick Casiano MD;  Location:  OR    ENDOSCOPIC RETROGRADE CHOLANGIOPANCREATOGRAM N/A 12/28/2018    Procedure: ENDOSCOPIC RETROGRADE CHOLANGIOPANCREATOGRAM, with Billary Sphincterotomy and Billary Stent Placement;  Surgeon: Omero Lawler MD;  Location:  OR    ENDOSCOPIC RETROGRADE CHOLANGIOPANCREATOGRAM  02/18/2019    Procedure:  "COMBINED ENDOSCOPIC RETROGRADE CHOLANGIOPANCREATOGRAPHY, Bile Duct Stent Exchange;  Surgeon: Omero Lawler MD;  Location: [facility] OR    ENDOSCOPIC RETROGRADE CHOLANGIOPANCREATOGRAM N/A 2019    Procedure: ENDOSCOPIC RETROGRADE CHOLANGIOPANCREATOGRAPHY, WITH BILIARY STENT REMOVAL AND STONE EXTRACTION;  Surgeon: Omero Lawler MD;  Location: [facility] OR    HERNIORRHAPHY INGUINAL  2018    Procedure: Herniorrhaphy inguinal;  Surgeon: Emil Echevarria MD;  Location: [facility] OR    IR LIVER BIOPSY PERCUTANEOUS  2018    LAPAROTOMY EXPLORATORY      per the patient \"for infection in the LLQ\"     PICC INSERTION Right 2024    47 cm basilic    TRANSPLANT LIVER RECIPIENT  DONOR N/A 2018    Procedure: TRANSPLANT LIVER RECIPIENT  DONOR;  Surgeon: Emil Echevarria MD;  Location: [facility] OR     apixaban ANTICOAGULANT (ELIQUIS) 5 MG tablet  aspirin (ASA) 81 MG chewable tablet  atorvastatin (LIPITOR) 40 MG tablet  diltiazem ER COATED BEADS (CARDIZEM CD/CARTIA XT) 120 MG 24 hr capsule  hydrALAZINE (APRESOLINE) 25 MG tablet  insulin glargine (LANTUS SOLOSTAR) 100 UNIT/ML pen  levothyroxine (SYNTHROID/LEVOTHROID) 150 MCG tablet  metFORMIN (GLUCOPHAGE XR) 500 MG 24 hr tablet  metoprolol succinate ER (TOPROL XL) 25 MG 24 hr tablet  mycophenolate (GENERIC EQUIVALENT) 500 MG tablet  predniSONE (DELTASONE) 5 MG tablet  Vitamin D3 (VITAMIN D, CHOLECALCIFEROL,) 25 mcg (1000 units) tablet      No Known Allergies  Family History  Family History   Problem Relation Age of Onset    Memory loss Mother     Liver Disease Brother     Skin Cancer No family hx of     Melanoma No family hx of      Social History   Social History     Tobacco Use    Smoking status: Former     Current packs/day: 0.00     Average packs/day: (1.4 ttl pk-yrs)     Types: Cigarettes, Cigars     Start date: 1970     Quit date: 3/14/2018     Years since quittin.3    Smokeless tobacco: Never    Tobacco comments:     Quit " smoking Feb 2018, one cigarette after dinner   Vaping Use    Vaping status: Never Used   Substance Use Topics    Alcohol use: No     Comment: Quit drinking Feb 2018    Drug use: No      A medically appropriate review of systems was performed with pertinent positives and negatives noted in the HPI, and all other systems negative.    Physical Exam   BP: (!) 82/52  Pulse: 101  Temp: 98  F (36.7  C)  Resp: 18  SpO2: 98 %  Physical Exam  Vitals and nursing note reviewed.   Constitutional:       General: He is not in acute distress.     Appearance: Normal appearance. He is not toxic-appearing or diaphoretic.   HENT:      Head: Atraumatic.   Eyes:      General: No scleral icterus.     Conjunctiva/sclera: Conjunctivae normal.      Pupils: Pupils are equal, round, and reactive to light.   Cardiovascular:      Rate and Rhythm: Normal rate and regular rhythm.      Heart sounds: Normal heart sounds. No murmur heard.     No friction rub. No gallop.   Pulmonary:      Effort: Pulmonary effort is normal. No respiratory distress.      Breath sounds: Normal breath sounds. No stridor. No wheezing, rhonchi or rales.   Chest:      Chest wall: No tenderness.   Abdominal:      General: Abdomen is flat. Bowel sounds are normal. There is no distension.      Palpations: Abdomen is soft. There is no mass.      Tenderness: There is no abdominal tenderness. There is no right CVA tenderness, left CVA tenderness, guarding or rebound.      Hernia: No hernia is present.   Musculoskeletal:         General: No tenderness or deformity.      Cervical back: Neck supple.   Skin:     General: Skin is warm and dry.      Findings: No rash.   Neurological:      General: No focal deficit present.      Mental Status: He is alert.      Cranial Nerves: No cranial nerve deficit.      Sensory: No sensory deficit.      Motor: No weakness.      Coordination: Coordination normal.      Gait: Gait normal.      Deep Tendon Reflexes: Reflexes normal.   Psychiatric:          Mood and Affect: Mood normal.           ED Course, Procedures, & Data      Procedures       IV Antibiotics given and/or elevated Lactate of 2.4 and no sepsis note found - Delete this reminder and enter the sepsis note or '.edcms' before signing chart.>>>     Results for orders placed or performed during the hospital encounter of 07/02/25   CT Head w/o Contrast   Result Value Ref Range    Radiologist flags (Urgent)      Right occipital lobe subacute to chronic infarct. This    Impression    IMPRESSION:   1. No acute intracranial hemorrhage.  2. Hypodensity involving the right occipital lobe consistent with  subacute to chronic infarct. This was not present on MRI 4/21/2024.  Consider brain MRI for further evaluation.     [Urgent Result: Right occipital lobe subacute to chronic infarct. This  was not present on MRI 4/21/2024. Consider brain MRI for further  evaluation]    Finding was identified on 7/2/2025 10:17 AM.     Dr. Knott was contacted by Dr. Valero at 7/2/2025 10:39 AM and  verbalized understanding of the urgent finding.    I have personally reviewed the examination and initial interpretation  and I agree with the findings.    MITCH SEGOVIA MD         SYSTEM ID:  [id number]   CTA Head Neck with Contrast   Result Value Ref Range    Radiologist flags Occlusion of the right internal carotid artery (Urgent)     Impression    IMPRESSION:    1. Head CTA demonstrates:   a. Occlusion of the intracranial portion of the right internal  carotid artery with collateral filling of the right middle cerebral  artery via the [place].   b. Moderate right P1 stenosis.   c. Mild mid basilar artery stenosis.   d. Moderate stenosis of the cavernous/clinoid segment of the left  internal carotid artery.   e. 3 mm saccular aneurysm of the supraclinoid left ICA.  2. Neck CTA demonstrates:   a. Occlusion of the right internal carotid artery at the carotid  bulb.   b. Left internal carotid artery and vertebral arteries are  patent.    [Urgent Result: Occlusion of the right internal carotid artery]    Finding was identified on 7/2/2025 10:28 AM.     Dr. Knott was contacted by Dr. Valero at 7/2/2025 10:40 AM and  verbalized understanding of the urgent finding.     I have personally reviewed the examination and initial interpretation  and I agree with the findings.    MITCH SEGOVIA MD         SYSTEM ID:  [id number]   Glucose by meter   Result Value Ref Range    GLUCOSE BY METER POCT 250 (H) 70 - 99 mg/dL   INR   Result Value Ref Range    INR 1.09 0.85 - 1.15    PT 14.1 11.8 - 14.8 Seconds   Comprehensive metabolic panel   Result Value Ref Range    Sodium 138 135 - 145 mmol/L    Potassium 4.4 3.4 - 5.3 mmol/L    Carbon Dioxide (CO2) 22 22 - 29 mmol/L    Anion Gap 13 7 - 15 mmol/L    Urea Nitrogen 23.1 (H) 8.0 - 23.0 mg/dL    Creatinine 1.17 0.67 - 1.17 mg/dL    GFR Estimate 66 >60 mL/min/1.73m2    Calcium 9.0 8.8 - 10.4 mg/dL    Chloride 103 98 - 107 mmol/L    Glucose 274 (H) 70 - 99 mg/dL    Alkaline Phosphatase 137 40 - 150 U/L    AST 19 0 - 45 U/L    ALT 15 0 - 70 U/L    Protein Total 6.6 6.4 - 8.3 g/dL    Albumin 3.6 3.5 - 5.2 g/dL    Bilirubin Total 0.6 <=1.2 mg/dL   Result Value Ref Range    Troponin T, High Sensitivity 32 (H) <=22 ng/L   UA with Microscopic reflex to Culture    Specimen: Urine, Clean Catch   Result Value Ref Range    Color Urine Light Yellow Colorless, Straw, Light Yellow, Yellow    Appearance Urine Clear Clear    Glucose Urine Negative Negative mg/dL    Bilirubin Urine Negative Negative    Ketones Urine Negative Negative mg/dL    Specific Gravity Urine 1.015 1.003 - 1.035    Blood Urine Trace (A) Negative    pH Urine 7.0 5.0 - 7.0    Protein Albumin Urine 200 (A) Negative mg/dL    Urobilinogen Urine Normal Normal mg/dL    Nitrite Urine Negative Negative    Leukocyte Esterase Urine Negative Negative    RBC Urine 3 (H) <=2 /HPF    WBC Urine 1 <=5 /HPF   CBC with platelets and differential   Result Value Ref Range     WBC Count 6.4 4.0 - 11.0 10e3/uL    RBC Count 5.50 4.40 - 5.90 10e6/uL    Hemoglobin 15.8 13.3 - 17.7 g/dL    Hematocrit 49.1 40.0 - 53.0 %    MCV 89 78 - 100 fL    MCH 28.7 26.5 - 33.0 pg    MCHC 32.2 31.5 - 36.5 g/dL    RDW 13.2 10.0 - 15.0 %    Platelet Count 222 150 - 450 10e3/uL    % Neutrophils 71 %    % Lymphocytes 20 %    % Monocytes 6 %    % Eosinophils 2 %    % Basophils 1 %    % Immature Granulocytes 0 %    NRBCs per 100 WBC 0 <1 /100    Absolute Neutrophils 4.5 1.6 - 8.3 10e3/uL    Absolute Lymphocytes 1.3 0.8 - 5.3 10e3/uL    Absolute Monocytes 0.4 0.0 - 1.3 10e3/uL    Absolute Eosinophils 0.1 0.0 - 0.7 10e3/uL    Absolute Basophils 0.0 0.0 - 0.2 10e3/uL    Absolute Immature Granulocytes 0.0 <=0.4 10e3/uL    Absolute NRBCs 0.0 10e3/uL   iStat Gases (lactate) venous, POCT   Result Value Ref Range    Lactic Acid POCT 2.4 (H) 0.7 - 2.0 mmol/L    Bicarbonate Venous POCT 27 21 - 28 mmol/L    O2 Sat, Venous POCT 70 70 - 75 %    pCO2 Venous POCT 47 40 - 50 mm Hg    pH Venous POCT 7.36 7.32 - 7.43    pO2 Venous POCT 38 25 - 47 mm Hg    Base Excess/Deficit (+/-) POCT 1.0 -3.0 - 3.0 mmol/L   Result Value Ref Range    Troponin T, High Sensitivity 29 (H) <=22 ng/L   Lactic acid whole blood   Result Value Ref Range    Lactic Acid 1.3 0.7 - 2.0 mmol/L   EKG 12-lead, tracing only   Result Value Ref Range    Systolic Blood Pressure  mmHg    Diastolic Blood Pressure  mmHg    Ventricular Rate 82 BPM    Atrial Rate  BPM    MT Interval  ms    QRS Duration 94 ms     ms    QTc 460 ms    P Axis  degrees    R AXIS -16 degrees    T Axis 139 degrees    Interpretation ECG       Atrial fibrillation  Left ventricular hypertrophy with repolarization abnormality ( R in aVL , Evan product )  Abnormal ECG    Unconfirmed report - interpretation of this ECG is computer generated - see medical record for final interpretation  Confirmed by - EMERGENCY ROOM, PHYSICIAN ([id number]),  NEHA MCNAMARA ([id number]) on 7/2/2025 9:36:13  AM     Adult Type and Screen   Result Value Ref Range    ABO/RH(D) B POS     Antibody Screen Negative Negative    SPECIMEN EXPIRATION DATE 7/5/2025 11:59:00 PM CDT      Medications   iopamidol (ISOVUE-370) solution 67 mL (67 mLs Intravenous $Given 7/2/25 0933)   sodium chloride (PF) 0.9% PF flush 90 mL (90 mLs Intravenous $Given 7/2/25 0933)          Critical care was not performed.     Medical Decision Making  The patient's presentation was of high complexity (an acute health issue posing potential threat to life or bodily function).    The patient's evaluation involved:  ordering and/or review of 3+ test(s) in this encounter (see separate area of note for details)    The patient's management necessitated further care after sign-out to incoming EP (see their note for further management).    Assessment & Plan    Dizziness and HA onset 9 pm 6/30, CT CTA as above-Neuro Stroke consult in progress, MRI pending. EKG labs ok.    Will sign off to incoming EP.    I have reviewed the nursing notes. I have reviewed the findings, diagnosis, plan and need for follow up with the patient.    New Prescriptions    No medications on file       Final diagnoses:   None     IKatty, am serving as a trained medical scribe to document services personally performed by Catarino Knott MD, based on the provider's statements to me.     Catarino BELTRE MD, was physically present and have reviewed and verified the accuracy of this note documented by Katty Bragg.      Catarino Knott MD  [facility] EMERGENCY DEPARTMENT  7/2/2025     Catarino Knott MD  07/02/25 [id number]

## 2025-07-02 NOTE — ED PROVIDER NOTES
Emergency Department I-PASS Sign-out      Illness Severity: Stable    Patient Summary:  70 year old male with pertinent PMH of HTN DM who presented with HA Dizziness    ED Course/treatment plan: CT subacute infarct, Neuro Stroke consult-MRI    Clinical Impression:  No diagnosis found.    Edited by: Catarino Knott MD at 7/2/2025 9023    Action List:  -To do:    Imaging: MRI    Situational Awareness & Contingency Planning:  Code Status (Most recent):  Prior    Disposition:      Edited by: Catarino Knott MD at 7/2/2025 3153    Synthesis & Events after sign-out:  4:20 PM -discussed with stroke neurology attending who accepts patient to floor.    Diagnosis acute ischemic stroke     Jesus Pereyra MD   Emergency Medicine       Jesus Pereyra MD  07/02/25 1609

## 2025-07-03 ENCOUNTER — APPOINTMENT (OUTPATIENT)
Dept: CT IMAGING | Facility: CLINIC | Age: 70
DRG: 065 | End: 2025-07-03
Payer: COMMERCIAL

## 2025-07-03 ENCOUNTER — VIRTUAL VISIT (OUTPATIENT)
Dept: INTERPRETER SERVICES | Facility: CLINIC | Age: 70
End: 2025-07-03
Payer: COMMERCIAL

## 2025-07-03 ENCOUNTER — APPOINTMENT (OUTPATIENT)
Dept: CARDIOLOGY | Facility: CLINIC | Age: 70
DRG: 065 | End: 2025-07-03
Payer: COMMERCIAL

## 2025-07-03 ENCOUNTER — APPOINTMENT (OUTPATIENT)
Dept: OCCUPATIONAL THERAPY | Facility: CLINIC | Age: 70
DRG: 065 | End: 2025-07-03
Payer: COMMERCIAL

## 2025-07-03 VITALS
HEART RATE: 64 BPM | OXYGEN SATURATION: 98 % | RESPIRATION RATE: 16 BRPM | DIASTOLIC BLOOD PRESSURE: 77 MMHG | SYSTOLIC BLOOD PRESSURE: 160 MMHG | TEMPERATURE: 97.9 F

## 2025-07-03 LAB
ANION GAP SERPL CALCULATED.3IONS-SCNC: 8 MMOL/L (ref 7–15)
ATRIAL RATE - MUSE: NORMAL BPM
BUN SERPL-MCNC: 21.4 MG/DL (ref 8–23)
CALCIUM SERPL-MCNC: 8.8 MG/DL (ref 8.8–10.4)
CHLORIDE SERPL-SCNC: 105 MMOL/L (ref 98–107)
CREAT SERPL-MCNC: 1.1 MG/DL (ref 0.67–1.17)
DIASTOLIC BLOOD PRESSURE - MUSE: NORMAL MMHG
EGFRCR SERPLBLD CKD-EPI 2021: 71 ML/MIN/1.73M2
ERYTHROCYTE [DISTWIDTH] IN BLOOD BY AUTOMATED COUNT: 13.1 % (ref 10–15)
GLUCOSE BLDC GLUCOMTR-MCNC: 119 MG/DL (ref 70–99)
GLUCOSE BLDC GLUCOMTR-MCNC: 125 MG/DL (ref 70–99)
GLUCOSE BLDC GLUCOMTR-MCNC: 271 MG/DL (ref 70–99)
GLUCOSE BLDC GLUCOMTR-MCNC: 309 MG/DL (ref 70–99)
GLUCOSE SERPL-MCNC: 116 MG/DL (ref 70–99)
HCO3 SERPL-SCNC: 26 MMOL/L (ref 22–29)
HCT VFR BLD AUTO: 48 % (ref 40–53)
HGB BLD-MCNC: 15.3 G/DL (ref 13.3–17.7)
INTERPRETATION ECG - MUSE: NORMAL
LVEF ECHO: NORMAL
MAGNESIUM SERPL-MCNC: 1.9 MG/DL (ref 1.7–2.3)
MCH RBC QN AUTO: 28.4 PG (ref 26.5–33)
MCHC RBC AUTO-ENTMCNC: 31.9 G/DL (ref 31.5–36.5)
MCV RBC AUTO: 89 FL (ref 78–100)
P AXIS - MUSE: NORMAL DEGREES
PHOSPHATE SERPL-MCNC: 3.8 MG/DL (ref 2.5–4.5)
PLATELET # BLD AUTO: 224 10E3/UL (ref 150–450)
POTASSIUM SERPL-SCNC: 4.3 MMOL/L (ref 3.4–5.3)
PR INTERVAL - MUSE: NORMAL MS
QRS DURATION - MUSE: 92 MS
QT - MUSE: 420 MS
QTC - MUSE: 433 MS
R AXIS - MUSE: -21 DEGREES
RBC # BLD AUTO: 5.39 10E6/UL (ref 4.4–5.9)
SODIUM SERPL-SCNC: 139 MMOL/L (ref 135–145)
SYSTOLIC BLOOD PRESSURE - MUSE: NORMAL MMHG
T AXIS - MUSE: 142 DEGREES
VENTRICULAR RATE- MUSE: 64 BPM
WBC # BLD AUTO: 5.8 10E3/UL (ref 4–11)

## 2025-07-03 PROCEDURE — 84100 ASSAY OF PHOSPHORUS: CPT

## 2025-07-03 PROCEDURE — 255N000002 HC RX 255 OP 636: Performed by: INTERNAL MEDICINE

## 2025-07-03 PROCEDURE — 93321 DOPPLER ECHO F-UP/LMTD STD: CPT | Mod: 26 | Performed by: INTERNAL MEDICINE

## 2025-07-03 PROCEDURE — 999N000226 HC STATISTIC SLP IP EVAL DEFER

## 2025-07-03 PROCEDURE — 93308 TTE F-UP OR LMTD: CPT | Mod: 26 | Performed by: INTERNAL MEDICINE

## 2025-07-03 PROCEDURE — 120N000003 HC R&B IMCU UMMC

## 2025-07-03 PROCEDURE — 250N000013 HC RX MED GY IP 250 OP 250 PS 637

## 2025-07-03 PROCEDURE — 85014 HEMATOCRIT: CPT

## 2025-07-03 PROCEDURE — 99233 SBSQ HOSP IP/OBS HIGH 50: CPT | Mod: GC | Performed by: STUDENT IN AN ORGANIZED HEALTH CARE EDUCATION/TRAINING PROGRAM

## 2025-07-03 PROCEDURE — 97165 OT EVAL LOW COMPLEX 30 MIN: CPT | Mod: GO

## 2025-07-03 PROCEDURE — 97530 THERAPEUTIC ACTIVITIES: CPT | Mod: GO

## 2025-07-03 PROCEDURE — 83735 ASSAY OF MAGNESIUM: CPT

## 2025-07-03 PROCEDURE — 93325 DOPPLER ECHO COLOR FLOW MAPG: CPT | Mod: 26 | Performed by: INTERNAL MEDICINE

## 2025-07-03 PROCEDURE — 75572 CT HRT W/3D IMAGE: CPT

## 2025-07-03 PROCEDURE — 75572 CT HRT W/3D IMAGE: CPT | Mod: 26 | Performed by: STUDENT IN AN ORGANIZED HEALTH CARE EDUCATION/TRAINING PROGRAM

## 2025-07-03 PROCEDURE — 250N000012 HC RX MED GY IP 250 OP 636 PS 637

## 2025-07-03 PROCEDURE — 74177 CT ABD & PELVIS W/CONTRAST: CPT | Mod: 26 | Performed by: RADIOLOGY

## 2025-07-03 PROCEDURE — 74177 CT ABD & PELVIS W/CONTRAST: CPT

## 2025-07-03 PROCEDURE — 82962 GLUCOSE BLOOD TEST: CPT

## 2025-07-03 PROCEDURE — 36415 COLL VENOUS BLD VENIPUNCTURE: CPT

## 2025-07-03 PROCEDURE — T1013 SIGN LANG/ORAL INTERPRETER: HCPCS | Mod: GT,TEL,95

## 2025-07-03 PROCEDURE — 999N000147 HC STATISTIC PT IP EVAL DEFER

## 2025-07-03 PROCEDURE — 80048 BASIC METABOLIC PNL TOTAL CA: CPT

## 2025-07-03 PROCEDURE — C8924 2D TTE W OR W/O FOL W/CON,FU: HCPCS

## 2025-07-03 PROCEDURE — 250N000011 HC RX IP 250 OP 636

## 2025-07-03 RX ORDER — IOPAMIDOL 755 MG/ML
120 INJECTION, SOLUTION INTRAVASCULAR ONCE
Status: COMPLETED | OUTPATIENT
Start: 2025-07-03 | End: 2025-07-03

## 2025-07-03 RX ADMIN — PREDNISONE 5 MG: 5 TABLET ORAL at 08:01

## 2025-07-03 RX ADMIN — IOPAMIDOL 120 ML: 755 INJECTION, SOLUTION INTRAVENOUS at 10:03

## 2025-07-03 RX ADMIN — MYCOPHENOLATE MOFETIL 500 MG: 500 TABLET, FILM COATED ORAL at 08:02

## 2025-07-03 RX ADMIN — APIXABAN 5 MG: 5 TABLET, FILM COATED ORAL at 08:01

## 2025-07-03 RX ADMIN — HYDRALAZINE HYDROCHLORIDE 25 MG: 25 TABLET ORAL at 08:01

## 2025-07-03 RX ADMIN — INSULIN GLARGINE 20 UNITS: 100 INJECTION, SOLUTION SUBCUTANEOUS at 21:41

## 2025-07-03 RX ADMIN — LEVOTHYROXINE SODIUM 150 MCG: 0.07 TABLET ORAL at 08:02

## 2025-07-03 RX ADMIN — MYCOPHENOLATE MOFETIL 500 MG: 500 TABLET, FILM COATED ORAL at 20:21

## 2025-07-03 RX ADMIN — METOPROLOL SUCCINATE 25 MG: 25 TABLET, EXTENDED RELEASE ORAL at 08:01

## 2025-07-03 RX ADMIN — DILTIAZEM HYDROCHLORIDE 120 MG: 120 CAPSULE, COATED, EXTENDED RELEASE ORAL at 08:02

## 2025-07-03 RX ADMIN — APIXABAN 5 MG: 5 TABLET, FILM COATED ORAL at 20:21

## 2025-07-03 RX ADMIN — HYDRALAZINE HYDROCHLORIDE 25 MG: 25 TABLET ORAL at 20:21

## 2025-07-03 RX ADMIN — ATORVASTATIN CALCIUM 40 MG: 40 TABLET, FILM COATED ORAL at 08:01

## 2025-07-03 RX ADMIN — HUMAN ALBUMIN MICROSPHERES AND PERFLUTREN 6 ML (DILUTED): 10; .22 INJECTION, SOLUTION INTRAVENOUS at 09:41

## 2025-07-03 ASSESSMENT — ACTIVITIES OF DAILY LIVING (ADL)
ADLS_ACUITY_SCORE: 58
ADLS_ACUITY_SCORE: 57
ADLS_ACUITY_SCORE: 58
ADLS_ACUITY_SCORE: 59
ADLS_ACUITY_SCORE: 58
ADLS_ACUITY_SCORE: 57
ADLS_ACUITY_SCORE: 59
ADLS_ACUITY_SCORE: 58
ADLS_ACUITY_SCORE: 58
ADLS_ACUITY_SCORE: 57
ADLS_ACUITY_SCORE: 57
ADLS_ACUITY_SCORE: 58
ADLS_ACUITY_SCORE: 58
ADLS_ACUITY_SCORE: 59
ADLS_ACUITY_SCORE: 58
ADLS_ACUITY_SCORE: 58
ADLS_ACUITY_SCORE: 57
ADLS_ACUITY_SCORE: 58

## 2025-07-03 NOTE — PLAN OF CARE
Physical Therapy: Orders received. Chart reviewed and discussed with care team.? Physical Therapy not indicated due to patient mobilizing at his baseline with symptoms back to baseline. OT worked with patient this morning to confirm and help with any discharge planning needs. No acute care PT needs at this time.? Defer discharge recommendations to OT and medical team.? Will complete orders.

## 2025-07-03 NOTE — CONSULTS
Cardiology Consult Note     Date of Service: 07/03/2025  Patient: Loy Limon  MRN: [id number]  Admission Date: 7/2/2025  Hospital Day: 1    Reason for Consult: 69 y/o here w/ multifocal stroke and R ICA occlusion. EF 55-60 in March to 30-35 7/2. Card CTA w flow void, hoping for recs regarding AC iso afib (PTA eliquis, possible atrial thrombus, hx of CAD and stent not on ASA.     Assessment:   Loy Limon is a 71yo man with PMHx of latent TB s/p treatment, HCC s/p liver transplant (2018), CAD s/p PCI to mLAD (2021), HTN, T2DM, Afib with RVR, hypothyroidism, CKD, anemia, presented to ED on 7/2/25 with blurry vision for 3 weeks and speech changes found to have late subacute or chronic infarcts involving nearly entire right occipital lobe. Etiology uncertain, he has a Right ICA occlusion but given stroke distribution concern for cardio-embolic source.     Echo 7/2/25 shows LVEF 35-40% with diffuse hypokinesis which is decreased compared to 3/8/25. CTA heart shows no HEATHER thrombus, non-diagnostic coronary images, severe coronary calcifications, moderate biatrial enlargement. After discussion with Dr. Goldberg (CT Heart reader) there is zero evidence of HEATHER thrombus; as such a RASHAUN would not be recommended as it would not add additional value. Moreover, patient's stroke prevention management would not change given that he is already on Apixaban. With regards to his reduction in LVEF, etiology for continued reduction is unclear but would recommend outpatient work up and GDMT optimization. He did have a CMR earlier this year which showed mixed cardiomyopathy with features of hypertrophic and ischemic cardiomyopathies.     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 Goldberg)  GDMT: continue PTA metoprolol succinate 25 mg daily; can start other elements of GDMT as an outpatient  Continue remainder of PTA cardiac medications: Atorvastatin 40 mg daily, Diltiazem 120 mg daily, Hydralazine 25 mg BID    Cardiology will sign off.     Patient was seen and plan of care was discussed with attending physician Dr. Wilson. Final recommendations pending Attending Attestation. Please don't hesitate to contact our team with any additional questions or concerns.    Howard Ford MD  Cardiology Fellow    Subjective   History of Present Illness:    Loy Limon is a 71yo man with PMHx of latent TB s/p treatment, HCC s/p liver transplant (2018), CAD s/p PCI to mLAD (2021), HTN, T2DM, Afib with RVR, hypothyroidism, CKD, anemia, presented to ED on 7/2/25 with blurry vision for 3 weeks and speech changes found to have late subacute or chronic infarcts involving nearly entire right occipital lobe. Etiology uncertain, he has a Right ICA occlusion but given stroke distribution concern for cardio-embolic source.     Review of Systems:  A comprehensive ROS was performed and found to be negative or non-contributory with the exception of that noted in the HPI above.      4/13/24 Echocardiogram:  Global and regional left ventricular function is normal with an EF of 55-60%.  Right ventricular function, chamber size, wall motion, and thickness are  normal.  Severe left atrial enlargement is present.  The inferior vena cava is normal.  No pericardial effusion is present.  There is no prior study for direct comparison.     3/9/25 Coronary Angiogram:    Mid LAD lesion is 20% stenosed.    Dist LAD lesion is 30% stenosed.    1st Diag lesion is 50% stenosed.    2nd Diag lesion is 70% stenosed.     1.left dominant coronary artery system  2.widely patent mid LAD stent  3.70 percent stenotic  lesion in second diagonal which is unchanged from prior study in .  This vessel is a small and clinically insignificant vessel.    Past Medical History:   Diagnosis Date    Anemia     Biliary stricture of transplanted liver (H) 2018    BPH (benign prostatic hyperplasia)     Cancer (H)     hepatocellualr carcinoma    Cirrhosis of liver (H) 2018    Diabetes (H)     Enlarged prostate     Hypothyroidism     Impotence of organic origin 2020    Inguinal hernia     Repaired with mesh on 18    Liver lesion 2018    Liver transplanted (H) 2018     donor liver transplant    Portal vein thrombosis     on path explant    Postoperative atrial fibrillation (H) 2018    Prostate cancer (H)     TB lung, latent     Treated      Past Surgical History:   Procedure Laterality Date    APPENDECTOMY      BRONCHOSCOPY (RIGID OR FLEXIBLE), DIAGNOSTIC N/A 2024    Procedure: BRONCHOSCOPY, WITH BRONCHOALVEOLAR LAVAGE;  Surgeon: Jimmy Farley MD;  Location:  GI    COLONOSCOPY          CV CORONARY ANGIOGRAM N/A 10/13/2021    Procedure: CV CORONARY ANGIOGRAM;  Surgeon: Yaya Hampton MD;  Location:  HEART CARDIAC CATH LAB    CV CORONARY ANGIOGRAM N/A 3/9/2025    Procedure: Coronary Angiogram;  Surgeon: Randy Doss MD;  Location:  HEART CARDIAC CATH LAB    CV CORONARY LITHOTRIPSY PCI N/A 10/13/2021    Procedure: CV Coronary Lithotripsy PCI;  Surgeon: Yaya Hampton MD;  Location:  HEART CARDIAC CATH LAB    CV INSTANTANEOUS WAVE-FREE RATIO N/A 10/13/2021    Procedure: Instantaneous Wave-Free Ratio;  Surgeon: Yaya Hampton MD;  Location:  HEART CARDIAC CATH LAB    CV INTRAVASULAR ULTRASOUND N/A 10/13/2021    Procedure: Intravascular Ultrasound;  Surgeon: Yaya Hampton MD;  Location:  HEART CARDIAC CATH LAB    CV PCI N/A 3/9/2025    Procedure: Percutaneous Coronary Intervention;  Surgeon: Selam  "MD Randy;  Location:  HEART CARDIAC CATH LAB    CV PCI STENT DRUG ELUTING N/A 10/13/2021    Procedure: Percutaneous Coronary Intervention Stent Drug Eluting;  Surgeon: Yaya Hampton MD;  Location:  HEART CARDIAC CATH LAB    DAVINCI PROSTATECTOMY N/A 2020    Procedure: Robot-assisted laparoscopic radical prostatectomy, Bladder biopsy;  Surgeon: Kenrick Casiano MD;  Location: [facility]    ENDOSCOPIC RETROGRADE CHOLANGIOPANCREATOGRAM N/A 2018    Procedure: ENDOSCOPIC RETROGRADE CHOLANGIOPANCREATOGRAM, with Billary Sphincterotomy and Billary Stent Placement;  Surgeon: Omero Lawler MD;  Location: [facility]    ENDOSCOPIC RETROGRADE CHOLANGIOPANCREATOGRAM  2019    Procedure: COMBINED ENDOSCOPIC RETROGRADE CHOLANGIOPANCREATOGRAPHY, Bile Duct Stent Exchange;  Surgeon: Omero Lawler MD;  Location: [facility]    ENDOSCOPIC RETROGRADE CHOLANGIOPANCREATOGRAM N/A 2019    Procedure: ENDOSCOPIC RETROGRADE CHOLANGIOPANCREATOGRAPHY, WITH BILIARY STENT REMOVAL AND STONE EXTRACTION;  Surgeon: Omero Lawler MD;  Location: [facility] OR    HERNIORRHAPHY INGUINAL  2018    Procedure: Herniorrhaphy inguinal;  Surgeon: Emil Echevarria MD;  Location:  OR    IR LIVER BIOPSY PERCUTANEOUS  2018    LAPAROTOMY EXPLORATORY      per the patient \"for infection in the LLQ\"     PICC INSERTION Right 2024    47 cm basilic    TRANSPLANT LIVER RECIPIENT  DONOR N/A 2018    Procedure: TRANSPLANT LIVER RECIPIENT  DONOR;  Surgeon: Emil Echevarria MD;  Location:  OR     Social History     Socioeconomic History    Marital status:    Tobacco Use    Smoking status: Former     Current packs/day: 0.00     Average packs/day: (1.4 ttl pk-yrs)     Types: Cigarettes, Cigars     Start date: 1970     Quit date: 3/14/2018     Years since quittin.3    Smokeless tobacco: Never    Tobacco comments:     Quit smoking 2018, one cigarette " after dinner   Vaping Use    Vaping status: Never Used   Substance and Sexual Activity    Alcohol use: No     Comment: Quit drinking Feb 2018    Drug use: No   Social History Narrative    Born in [place], came to [place] years ago.     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cardboard, trimming meats     Social Drivers of Health     Financial Resource Strain: Low Risk  (3/8/2025)    Financial Resource Strain     Within the past 12 months, have you or your family members you live with been unable to get utilities (heat, electricity) when it was really needed?: No   Food Insecurity: Low Risk  (3/8/2025)    Food Insecurity     Within the past 12 months, did you worry that your food would run out before you got money to buy more?: No     Within the past 12 months, did the food you bought just not last and you didn t have money to get more?: No   Transportation Needs: Low Risk  (3/8/2025)    Transportation Needs     Within the past 12 months, has lack of transportation kept you from medical appointments, getting your medicines, non-medical meetings or appointments, work, or from getting things that you need?: No   Interpersonal Safety: Low Risk  (3/8/2025)    Interpersonal Safety     Do you feel physically and emotionally safe where you currently live?: Yes     Within the past 12 months, have you been hit, slapped, kicked or otherwise physically hurt by someone?: No     Within the past 12 months, have you been humiliated or emotionally abused in other ways by your partner or ex-partner?: No   Housing Stability: High Risk (3/8/2025)    Housing Stability     Do you have housing? : No     Are you worried about losing your housing?: No      Family History   Problem Relation Age of Onset    Memory loss Mother     Liver Disease Brother     Skin Cancer No family hx of     Melanoma No family hx of      (Not in a hospital admission)    Current Facility-Administered Medications   Medication Dose Route Frequency Provider Last Rate Last Admin     apixaban ANTICOAGULANT (ELIQUIS) tablet 5 mg  5 mg Oral or Feeding Tube BID Josie Ochoa MD   5 mg at 07/03/25 0801    atorvastatin (LIPITOR) tablet 40 mg  40 mg Oral or Feeding Tube Daily Josie Ochoa MD   40 mg at 07/03/25 0801    glucose gel 15-30 g  15-30 g Oral Q15 Min PRN Colin Morin MD        Or    dextrose 50 % injection 25-50 mL  25-50 mL Intravenous Q15 Min PRN Colin Morin MD        Or    glucagon injection 1 mg  1 mg Subcutaneous Q15 Min PRN Colin Morin MD        diltiazem ER COATED BEADS (CARDIZEM CD/CARTIA XT) 24 hr capsule 120 mg  120 mg Oral Daily Colin Morin MD   120 mg at 07/03/25 0802    hydrALAZINE (APRESOLINE) injection 10 mg  10 mg Intravenous Q30 Min PRN Josie Ochoa MD        hydrALAZINE (APRESOLINE) tablet 25 mg  25 mg Oral BID Colin Morin MD   25 mg at 07/03/25 0801    insulin aspart (NovoLOG) injection (RAPID ACTING)  1-3 Units Subcutaneous TID AC Colin Morin MD   2 Units at 07/03/25 1222    insulin aspart (NovoLOG) injection (RAPID ACTING)  1-3 Units Subcutaneous At Bedtime Colin Morin MD   1 Units at 07/02/25 2256    insulin glargine (LANTUS PEN) injection 20 Units  20 Units Subcutaneous At Bedtime Colin Morin MD   20 Units at 07/02/25 2256    labetalol (NORMODYNE/TRANDATE) injection 10 mg  10 mg Intravenous Q10 Min PRN Josie Ochoa MD        levothyroxine (SYNTHROID/LEVOTHROID) tablet 150 mcg  150 mcg Oral or Feeding Tube Daily Josie Ochoa MD   150 mcg at 07/03/25 0802    lidocaine (LMX4) cream   Topical Q1H PRN Josie Ochoa MD        lidocaine 1 % 0.1-1 mL  0.1-1 mL Other Q1H PRN Josie Ochoa MD        metoprolol succinate ER (TOPROL XL) 24 hr tablet 25 mg  25 mg Oral Daily Colin Morin MD   25 mg at 07/03/25 0801    mycophenolate (GENERIC EQUIVALENT) tablet 500 mg  500 mg Oral BID Colin Morin MD   500 mg at 07/03/25 0802    Patient is already receiving anticoagulation with heparin, enoxaparin (LOVENOX), warfarin (COUMADIN)  or  other anticoagulant medication   Does not apply Continuous PRN Josie Ochoa MD        predniSONE (DELTASONE) tablet 5 mg  5 mg Oral Daily Colin Morin MD   5 mg at 07/03/25 0801    sodium chloride (PF) 0.9% PF flush 3 mL  3 mL Intracatheter Q8H WILL Josie Ochoa MD   3 mL at 07/03/25 1436    sodium chloride (PF) 0.9% PF flush 3 mL  3 mL Intracatheter q1 min prn Josie Ochoa MD         Current Outpatient Medications   Medication Sig Dispense Refill    apixaban ANTICOAGULANT (ELIQUIS) 5 MG tablet Take 1 tablet (5 mg) by mouth 2 times daily. 180 tablet 3    atorvastatin (LIPITOR) 40 MG tablet Take 1 tablet (40 mg) by mouth daily. 90 tablet 2    diltiazem ER COATED BEADS (CARDIZEM CD/CARTIA XT) 120 MG 24 hr capsule Take 1 capsule (120 mg) by mouth daily. 90 capsule 3    hydrALAZINE (APRESOLINE) 25 MG tablet Take 1 tablet (25 mg) by mouth 2 times daily. 180 tablet 3    insulin glargine (LANTUS SOLOSTAR) 100 UNIT/ML pen Inject 20 Units subcutaneously at bedtime. 15 mL 1    levothyroxine (SYNTHROID/LEVOTHROID) 150 MCG tablet Take 1 tablet (150 mcg) by mouth daily. 90 tablet 3    metFORMIN (GLUCOPHAGE XR) 500 MG 24 hr tablet Take 2 tablets (1,000 mg) by mouth 2 times daily (with meals). 360 tablet 3    metoprolol succinate ER (TOPROL XL) 25 MG 24 hr tablet Take 1 tablet (25 mg) by mouth daily. 90 tablet 3    mycophenolate (GENERIC EQUIVALENT) 500 MG tablet Take 1 tablet (500 mg) by mouth 2 times daily 180 tablet 3    predniSONE (DELTASONE) 5 MG tablet Take 1 tablet (5 mg) by mouth daily. 90 tablet 1    Vitamin D3 (VITAMIN D, CHOLECALCIFEROL,) 25 mcg (1000 units) tablet Take 2,000 Units by mouth daily.         Objective   Physical Exam:    Blood pressure 131/76, pulse 78, temperature 97.7  F (36.5  C), temperature source Oral, resp. rate 18, SpO2 97%.    Exam:  General: NAD  HEENT: no scleral icterus or injection  CARDIAC: RRR, no murmurs. No JVD  RESP:  CTAB  GI: nondistended  : No cota  EXTREMITIES: no LE  edema  SKIN: No acute lesions appreciated  NEURO: No focal deficits    Labs & Studies: I personally reviewed the following studies:  CMP  Recent Labs   Lab 25  1215 25  0807 25  0628 25  2253 25  1559 25  0829   NA  --   --  139  --   --  138   POTASSIUM  --   --  4.3  --   --  4.4   CHLORIDE  --   --  105  --   --  103   CO2  --   --  26  --   --  22   ANIONGAP  --   --  8  --   --  13   * 125* 116* 265*   < > 274*   BUN  --   --  21.4  --   --  23.1*   CR  --   --  1.10  --   --  1.17   GFRESTIMATED  --   --  71  --   --  66   YELENA  --   --  8.8  --   --  9.0   MAG  --   --  1.9  --   --   --    PHOS  --   --  3.8  --   --   --    PROTTOTAL  --   --   --   --   --  6.6   ALBUMIN  --   --   --   --   --  3.6   BILITOTAL  --   --   --   --   --  0.6   ALKPHOS  --   --   --   --   --  137   AST  --   --   --   --   --  19   ALT  --   --   --   --   --  15    < > = values in this interval not displayed.     CBC  Recent Labs   Lab 25  0628 25  0829   WBC 5.8 6.4   RBC 5.39 5.50   HGB 15.3 15.8   HCT 48.0 49.1   MCV 89 89   MCH 28.4 28.7   MCHC 31.9 32.2   RDW 13.1 13.2    222     INR  Recent Labs   Lab 25  0829   INR 1.09           Latest Ref Rng & Units 2024     6:06 AM   PFT   FVC L 2.80    FEV1 L 1.89    FVC% % 80    FEV1% % 70          Recent Results (from the past 4320 hours)   Echo Limited   Result Value    LVEF  35-40% (moderately reduced)    Narrative    [id number]  [id number]  [id number]  [id number]^ESVIN^YARI^     [facility]  Echocardiography Laboratory  [address] [place] [id number]     Name: DONNA BIGGS  MRN: [id number]  : 1955  Study Date: 2025 08:37 AM  Age: 70 yrs  Gender: Male  Patient Location: [facility]  Reason For Study: Cerebrovascular Incident  Ordering Physician: YARI MCALLISTER  Performed By: Esthela Childress RDCS     BSA: 1.9 m2  Height: 64 in  Weight: 193 lb  HR: 70  BP:  129/103 mmHg  ______________________________________________________________________________  Procedure  Limited Echocardiogram with two-dimensional, color and spectral Doppler.  Contrast Optison. Patient was given 6 ml mixture of 3 ml Optison and 6 ml  saline. 3 ml wasted.  ______________________________________________________________________________  Interpretation Summary  Left ventricular function is decreased. The ejection fraction is 35-40%  (moderately reduced). Moderate diffuse hypokinesis.  Right ventricle is not well visualized.     This study was compared with the study from 3/8/25. LVEF is lower.  Message sent to ordering physician.  ______________________________________________________________________________  Left Ventricle  Left ventricular function is decreased. The ejection fraction is 35-40%  (moderately reduced). Diastolic function not assessed due to atrial  fibrillation. Moderate diffuse hypokinesis is present.     Right Ventricle  The right ventricle cannot be assessed.     Atria  Mild biatrial enlargement is present.     Mitral Valve  Moderate mitral annular calcification is present.     Aortic Valve  Aortic valve is normal in structure and function. Trace aortic insufficiency  is present.     Tricuspid Valve  The tricuspid valve is normal. Trace tricuspid insufficiency is present.     Pulmonic Valve  The pulmonic valve is normal.     Vessels  The inferior vena cava was normal in size with preserved respiratory  variability.     Compared to Previous Study  This study was compared with the study from 3/8/25 .     ______________________________________________________________________________  MMode/2D Measurements & Calculations  LVOT diam: 2.0 cm  LVOT area: 3.1 cm2  LA Volume (BP): 74.3 ml     LA Volume Index (BP): 38.5 ml/m2  TAPSE: 1.5 cm     Doppler Measurements & Calculations  RV S Dewayne: 9.4 cm/sec     ______________________________________________________________________________  Report  approved by: Rakesh Gruber MD on 07/03/2025 11:01 AM         Echocardiogram Complete - For age > 60 yrs *Canceled*    Narrative    The following orders were created for panel order Echocardiogram Complete - For age > 60 yrs.  Procedure                               Abnormality         Status                     ---------                               -----------         ------                       Please view results for these tests on the individual orders.   Echocardiogram Complete - For age > 60 yrs *Canceled*    Narrative    The following orders were created for panel order Echocardiogram Complete - For age > 60 yrs.  Procedure                               Abnormality         Status                     ---------                               -----------         ------                       Please view results for these tests on the individual orders.   Echocardiogram Complete - For age > 60 yrs *Canceled*    Narrative    The following orders were created for panel order Echocardiogram Complete - For age > 60 yrs.  Procedure                               Abnormality         Status                     ---------                               -----------         ------                       Please view results for these tests on the individual orders.   Echocardiogram Complete - For age > 60 yrs *Canceled*    Narrative    The following orders were created for panel order Echocardiogram Complete - For age > 60 yrs.  Procedure                               Abnormality         Status                     ---------                               -----------         ------                       Please view results for these tests on the individual orders.   Echo Complete   Result Value    LVEF  55-60%    Narrative    [id number]  [id number]  [id number]  [id number]^TIMGS^RICHMOND^Y     [facility],[place]  Echocardiography Laboratory  [address] [place] [id number]     Name: DONNA BIGGS  V  MRN: [id number]  : 1955  Study Date: 2025 07:23 AM  Age: 69 yrs  Gender: Male  Patient Location: [facility]  Reason For Study: Chest Pain  Ordering Physician: RICHMOND SCHERER  Performed By: Yanira Bee     BSA: 1.9 m2  Height: 64 in  Weight: 194 lb  HR: 102  BP: 140/90 mmHg  ______________________________________________________________________________  Procedure  Echocardiogram with two-dimensional, color and spectral Doppler. Contrast  Optison. Poor acoustic windows. Optison (NDC #8936-5000-65) given  intravenously. Patient was given 6 ml mixture of 3 ml Optison and 6 ml saline.  3 ml wasted.  ______________________________________________________________________________  Interpretation Summary  Global and regional left ventricular function is normal with an EF of 55-60%.  Right ventricular function, chamber size, wall motion, and thickness are  normal.  IVC diameter >2.1 cm collapsing <50% with sniff suggests a high RA pressure  estimated at 15 mmHg or greater.  No pericardial effusion is present.  ______________________________________________________________________________  Left Ventricle  Global and regional left ventricular function is normal with an EF of 55-60%.  Left ventricular size is normal. Mild concentric wall thickening consistent  with left ventricular hypertrophy is present. Diastolic function not assessed  due to atrial fibrillation. No regional wall motion abnormalities are seen.     Right Ventricle  Right ventricular function, chamber size, wall motion, and thickness are  normal.     Atria  The right atria appears normal. Severe left atrial enlargement is present.     Mitral Valve  Mild to moderate mitral annular calcification is present. Trace to mild mitral  insufficiency is present.     Aortic Valve  Aortic valve is normal in structure and function.     Tricuspid Valve  The tricuspid valve is normal. Trace tricuspid insufficiency is present. The  right ventricular systolic  pressure is approximated at 24.3 mmHg plus the  right atrial pressure.     Pulmonic Valve  The pulmonic valve cannot be assessed.     Vessels  The thoracic aorta is normal. The pulmonary artery cannot be assessed. IVC  diameter >2.1 cm collapsing <50% with sniff suggests a high RA pressure  estimated at 15 mmHg or greater.     Pericardium  No pericardial effusion is present.     ______________________________________________________________________________  MMode/2D Measurements & Calculations  IVSd: 1.6 cm  LVIDd: 4.6 cm  LVIDs: 3.4 cm  LVPWd: 1.2 cm  FS: 26.0 %     LV mass(C)d: 257.6 grams  LV mass(C)dI: 133.4 grams/m2  Ao root diam: 3.2 cm  asc Aorta Diam: 3.6 cm  LVOT diam: 2.2 cm  LVOT area: 3.7 cm2  Ao root diam index Ht(cm/m): 2.0  Ao root diam index BSA (cm/m2): 1.7  Asc Ao diam index BSA (cm/m2): 1.9  Asc Ao diam index Ht(cm/m): 2.2  LA Volume (BP): 97.1 ml  LA Volume Index (BP): 50.3 ml/m2     RWT: 0.50  TAPSE: 1.7 cm     Doppler Measurements & Calculations  TR max radha: 246.4 cm/sec  TR max P.3 mmHg     ______________________________________________________________________________  Report approved by: ANDREA Perez MD on 2025 08:46 AM             Imaging:  Reviewed       Valve  The tricuspid valve is normal. Trace tricuspid insufficiency is present. The  right ventricular systolic pressure is approximated at 24.3 mmHg plus the  right atrial pressure.     Pulmonic Valve  The pulmonic valve cannot be assessed.     Vessels  The thoracic aorta is normal. The pulmonary artery cannot be assessed. IVC  diameter >2.1 cm collapsing <50% with sniff suggests a high RA pressure  estimated at 15 mmHg or greater.     Pericardium  No pericardial effusion is present.     ______________________________________________________________________________  MMode/2D Measurements & Calculations  IVSd: 1.6 cm  LVIDd: 4.6 cm  LVIDs: 3.4 cm  LVPWd: 1.2 cm  FS: 26.0 %     LV mass(C)d: 257.6 grams  LV mass(C)dI: 133.4 grams/m2  Ao root diam: 3.2 cm  asc Aorta Diam: 3.6 cm  LVOT diam: 2.2 cm  LVOT area: 3.7 cm2  Ao root diam index Ht(cm/m): 2.0  Ao root diam index BSA (cm/m2): 1.7  Asc Ao diam index BSA (cm/m2): 1.9  Asc Ao diam index Ht(cm/m): 2.2  LA Volume (BP): 97.1 ml  LA Volume Index (BP): 50.3 ml/m2     RWT: 0.50  TAPSE: 1.7 cm     Doppler Measurements & Calculations  TR max radha: 246.4 cm/sec  TR max P.3 mmHg     ______________________________________________________________________________  Report approved by: ANDREA Perez MD on 2025 08:46 AM             Imaging:  Reviewed

## 2025-07-03 NOTE — PLAN OF CARE
BP (!) 158/103   Pulse 77   Temp 97.5  F (36.4  C)   Resp 18   SpO2 99%     Neuro: A&Ox4.   Cardiac: Afebrile. Afib. With two Vtach event overnight. 20 beats and 14 beats. Team aware.  OVSS.   Respiratory: Sating 92%+ on RA.  GI/: Adequate urine output. BM X0  Diet/appetite: Tolerating regular diet.   Activity:  Independent, up to chair and in halls.  Pain: No reported pain.   Skin: No new deficits noted.  LDA's: R PIV SL.     Plan: Continue with POC. Notify primary team with changes.

## 2025-07-03 NOTE — PROVIDER NOTIFICATION
Paged Neuro Stroke team: Pt had 20 beats of vtach at 2319. Asymptomatic. Continues to be in afib from earlier in the day.     Provider Mikel: ordered EKG

## 2025-07-03 NOTE — PROGRESS NOTES
"   07/03/25 9199   Appointment Info   Signing Clinician's Name / Credentials (OT) Yesenia Luo, OTR/L   Rehab Comments (OT) stroke sx's resolved   Living Environment   People in Home spouse   Current Living Arrangements apartment   Home Accessibility stairs to enter home   Number of Stairs, Main Entrance 8   Stair Railings, Main Entrance railings safe and in good condition   Transportation Anticipated family or friend will provide   Living Environment Comments Pt lives w/ spouse, 8 steps to apt. Has tub/shower, no DME.   Self-Care   Usual Activity Tolerance good   Current Activity Tolerance good   Regular Exercise Yes   Activity/Exercise Type walking   Exercise Amount/Frequency 3-5 times/wk;1 hr   Equipment Currently Used at Home none   Fall history within last six months no   Activity/Exercise/Self-Care Comment IND in ADLs PTA. Walks to park w/ spouse, takes about 1hr total, no AD for mobility   Instrumental Activities of Daily Living (IADL)   IADL Comments IND in IADLs, splits tasks w/ spouse   General Information   Onset of Illness/Injury or Date of Surgery 07/02/25   Referring Physician Josie Ochoa   Patient/Family Therapy Goal Statement (OT) Return home   Additional Occupational Profile Info/Pertinent History of Current Problem Per EMR, \"70 year old male with a past medical history significant for HTN, CAD, N-STEMI in 3/2025, a-fib with RVR on Eliquis, T2DM on long-lasting insulin, prostate cancer, cirrhosis of liver s/p liver transplant in 2018, and former tobacco use, who presented to the ED today after experiencing intermittent headache, blurry vision, and temporary changes in speech occurring in the last 2-3 weeks. He states that over the past two days, the headache at the back of his head has increased in intensity, has had dizziness, and altered sensory changes in his fingers and feet. The patient states that he initially felt like he was unable to move his arms or legs and \"his body was not " "responding\", but states that this resolved after about 3 minutes and he was able to go sit down with assist from his wife.  He states that he feels very unstable when walking and is afraid to drive because of his symptoms.  The patient states he also has episodes where he does not know where he is and feels like \"his mind goes away\" for a few seconds and is unable to communicate. During these episodes, he reports that his tongue \"felt heavy\" and his wife, at bedside, said he sounded slightly slurred for only a few minutes.      A stroke code was not called when he arrived to the ED this morning due to the timing of onset of these symptoms, however a consult was placed after imaging revealed findings indicative of subacute infarct.     NIHSS score of 0 at this time. BP has been elevated in the 180s/100s. Patient has been in A-Fib on telemetry with HR in the 90s. CT Head wo contrast showed right occipital lobe subacute-chronic infarct, but no acute intracranial hemorrhage. CTA head and neck with contrast showed new occlusion of right internal carotid artery with collateral filling of the right MCA, as well as stenosis of other intracranial vessels. Patient to be admitted for further workup since patient endorses Eliquis compliance and not missing any doses.\"   Existing Precautions/Restrictions fall   Left Upper Extremity (Weight-bearing Status) full weight-bearing (FWB)   Right Upper Extremity (Weight-bearing Status) full weight-bearing (FWB)   Left Lower Extremity (Weight-bearing Status) full weight-bearing (FWB)   Right Lower Extremity (Weight-bearing Status) full weight-bearing (FWB)   General Observations and Info Activity: up with assist   Cognitive Status Examination   Orientation Status orientation to person, place and time   Cognitive Status Comments follows commands, cognition appears intact   Visual Perception   Visual Impairment/Limitations WFL;corrective lenses for reading  (pt reports improvement in " "vision, blurriness related to age per MD - has since requested new prescription for glasses)   Sensory   Sensory Quick Adds sensation intact   Sensory Comments natalia feet \"some\" n/t   Pain Assessment   Patient Currently in Pain No   Posture   Posture forward head position;protracted shoulders   Range of Motion Comprehensive   General Range of Motion bilateral upper extremity ROM WFL   Strength Comprehensive (MMT)   General Manual Muscle Testing (MMT) Assessment no strength deficits identified   Coordination   Upper Extremity Coordination No deficits were identified   Bed Mobility   Bed Mobility No deficits identified   Transfers   Transfers sit-stand transfer   Transfer Comments SBA   Balance   Balance Assessment standing dynamic balance   Standing Balance: Dynamic supervision;contact guard   Balance Comments cues for pacing, no overt LOB noted w/ ambulation   Activities of Daily Living   BADL Assessment/Intervention bathing;lower body dressing;grooming;toileting   Bathing Assessment/Intervention   [place] (Bathing) supervision   Comment, (Bathing) per clinical judgement   Lower Body Dressing Assessment/Training   [place] (Lower Body Dressing) supervision;set up   Comment, (Lower Body Dressing) donning shoes at EOB   Grooming Assessment/Training   [place] (Grooming) supervision;set up   Comment, (Grooming) per clinical judgement   Toileting   [place] (Toileting) supervision   Comment, (Toileting) per clinical judgement   Clinical Impression   Criteria for Skilled Therapeutic Interventions Met (OT) Yes, treatment indicated   OT Diagnosis Dec IND in I/ADLs   OT Problem List-Impairments impacting ADL problems related to;activity tolerance impaired   Assessment of Occupational Performance 1-3 Performance Deficits   Identified Performance Deficits act fabián for I/ADLs   Planned Therapy Interventions (OT) ADL retraining;IADL retraining;strengthening;progressive activity/exercise "   Clinical Decision Making Complexity (OT) problem focused assessment/low complexity   Risk & Benefits of therapy have been explained evaluation/treatment results reviewed;risks/benefits reviewed;care plan/treatment goals reviewed;current/potential barriers reviewed;participants voiced agreement with care plan;participants included;patient;spouse/significant other   Clinical Impression Comments Pt below yet near functional baseline. Would benefit from continued OT services to promote strength and IND in I/ADLs   OT Total Evaluation Time   OT Eval, Low Complexity Minutes (22597) 8   OT Goals   Therapy Frequency (OT) One time eval and treatment   OT Predicted Duration/Target Date for Goal Attainment 07/03/25   OT Goals Lower Body Dressing;Transfers;Cardiac Phase 1   OT: Lower Body Dressing Supervision/stand-by assist;Goal Met   OT: Transfer Supervision/stand-by assist;Goal Met   OT: Understanding of cardiac education to maximize quality of life, condition management, and health outcomes Patient;Verbalize;Goal Met   OT: Perform aerobic activity with stable cardiovascular response continuous;20 minutes;Goal Met   OT: Functional/aerobic ambulation tolerance with stable cardiovascular response in order to return to home and community environment Supervision/SBA;Greater than 300 feet;Goal Met   OT Discharge Planning   OT Plan dc   OT Discharge Recommendation (DC Rec) home with outpatient cardiac rehab   OT Rationale for DC Rec Pt presents near functional baseline. Rec home w/ OP CR to promote strength and activity tolerance in I/ADLs if pt qualifies (appears to have EF 30-35%). Has good support from spouse at home with heavier IADL tasks.   OT Brief overview of current status SBA   Total Session Time   Timed Code Treatment Minutes 24   Total Session Time (sum of timed and untimed services) 32

## 2025-07-03 NOTE — PLAN OF CARE
Shift: [id number] (patient transferred to )    /76 (BP Location: Left arm)   Pulse 78   Temp 97.7  F (36.5  C) (Oral)   Resp 18   SpO2 97%     Summary:  Neuro: A&Ox4. Able to make needs known. Neuro checks q 4 hours, (see flowsheets for assessment)  Cardiac: Afebrile, on tele, a-fib with HR in 70s-90s. Patient denies CP. C/o intermittent dizziness.  Respiratory: RA, denies SOB  GI/: Voiding spontaneously. No BM this shift.   Diet/appetite: Tolerating regular diet, AC/HS blood sugar checks. Denies nausea   Activity: Up with stand by assist    Pain: Denies   Skin: No new deficits noted.  Lines: R PIV saline locked

## 2025-07-03 NOTE — MEDICATION SCRIBE - ADMISSION MEDICATION HISTORY
Medication Scribe Admission Medication History    Admission medication history is complete. The information provided in this note is only as accurate as the sources available at the time of the update.    Information Source(s): Patient, Patient's pharmacy, Clinic records, and CareEverywhere/SureScripts via in-person    Pertinent Information:     Pt stated no longer taking Aspirin, I have removed from pta med list.   Medications have been updated on the pta medication list.   Pt did state that he has a home nurse that comes in once a week to put medications into a med strip.     Changes made to PTA medication list:  Added: None  Deleted: aspirin  Changed: None    Allergies reviewed with patient and updates made in EHR: yes    Medication History Completed By: Maggie Ruiz 7/3/2025 3:01 PM    PTA Med List   Medication Sig Last Dose/Taking    apixaban ANTICOAGULANT (ELIQUIS) 5 MG tablet Take 1 tablet (5 mg) by mouth 2 times daily. 7/1/2025 Morning    atorvastatin (LIPITOR) 40 MG tablet Take 1 tablet (40 mg) by mouth daily. 7/1/2025 Morning    diltiazem ER COATED BEADS (CARDIZEM CD/CARTIA XT) 120 MG 24 hr capsule Take 1 capsule (120 mg) by mouth daily. 7/1/2025 Morning    hydrALAZINE (APRESOLINE) 25 MG tablet Take 1 tablet (25 mg) by mouth 2 times daily. 7/1/2025 Morning    insulin glargine (LANTUS SOLOSTAR) 100 UNIT/ML pen Inject 20 Units subcutaneously at bedtime. 7/1/2025 Morning    levothyroxine (SYNTHROID/LEVOTHROID) 150 MCG tablet Take 1 tablet (150 mcg) by mouth daily. 7/1/2025 Morning    metFORMIN (GLUCOPHAGE XR) 500 MG 24 hr tablet Take 2 tablets (1,000 mg) by mouth 2 times daily (with meals). 7/1/2025 Morning    metoprolol succinate ER (TOPROL XL) 25 MG 24 hr tablet Take 1 tablet (25 mg) by mouth daily. 7/1/2025 Morning    mycophenolate (GENERIC EQUIVALENT) 500 MG tablet Take 1 tablet (500 mg) by mouth 2 times daily 7/1/2025 Morning    predniSONE (DELTASONE) 5 MG tablet Take 1 tablet (5 mg) by mouth daily.  7/1/2025 Morning    Vitamin D3 (VITAMIN D, CHOLECALCIFEROL,) 25 mcg (1000 units) tablet Take 2,000 Units by mouth daily. 6/29/2025 Evening

## 2025-07-03 NOTE — PROGRESS NOTES
Transfer  Transferred to: 6A at 1900  Via: wheelchair  Reason for transfer: Bed available on 6A   Family: Aware of transfer  Belongings: Sent with pt  Chart: Sent with pt  Medications: Meds from bin sent to 6A via tube system  Report called to: Tahira ZAMARRIPA

## 2025-07-03 NOTE — PROGRESS NOTES
"      [facility]    Vascular Neurology Progress Note    Interval History     RN notes reviewed. Patient had two episodes of vtach overnight that were self- limiting and asymptomatic.  Pt currently laying in bed in NAD. He is in good spirits. No new symptoms overnight. Planning to consult cardiology today.    Today's changes  -Consulted cardiology regarding:   - Cardiac CTA findings   - Newly reduced EF on TTE   - Possible need for ASA given cardiac hx   - AC selection if cardiac thrombus is present despite eliquis   - Need for RASHAUN?      Hospital Course     Chief complaint: Dizziness and Hypotension    Loy Limon is a 70 year old male with a past medical history significant for HTN, CAD, N-STEMI in 3/2025, a-fib with RVR on Eliquis, T2DM on long-lasting insulin, prostate cancer, cirrhosis of liver s/p liver transplant in 2018, and former tobacco use, who presented to the ED today after experiencing intermittent headache, blurry vision, and temporary changes in speech occurring in the last 2-3 weeks. Two days prior to presentation, the headache at the back of his head increased in intensity. He developed dizziness and numbness/tingling in his bilateral fingers and feet. The patient was initially unable to move his arms or legs and \"his body was not responding\", it resolved after about 3 minutes. He felt unstable when walking and was afraid to drive due to his symptoms. He also experienced episodes where he did not know where he was and felt like \"his mind went away\" for a few seconds. During these episodes, his tongue \"felt heavy\" and his wife endorsed slurred speech for only a few minutes.      A stroke code was not called when he arrived to the ED due to the timing of symptoms, however a consult was placed after imaging revealed findings indicative of subacute infarct.     NIHSS score of 0 at the time of initial evaluation. BP has been elevated in the 180s/100s. " Patient has been in A-Fib on telemetry with HR in the 90s. CT Head wo contrast showed right occipital lobe subacute-chronic infarct, but no acute intracranial hemorrhage. CTA head and neck with contrast showed new occlusion of right internal carotid artery with collateral filling of the right MCA, as well as stenosis of other intracranial vessels. The patient was admitted to the vascular neurology service for further workup of since patient endorses Eliquis compliance and not missing any doses.  In fact, he reports having a home health nurse who sets up his medications weekly.      Assessment and Plan     #Acute on chronic ischemic infarct of right occipital lobe  #Subacute scattered punctate infarcts involving multiple vascular territories (R & L MCA, L PCA)   #R ICA occlusion  #Atrial fibrillation w/ RVR on eliquis  #Atrial filling defect on cardiac CTA    Loy Limon is a 70 year old male with a past medical history significant for HTN, CAD, N-STEMI in 3/2025, a-fib with RVR on Eliquis, T2DM on long-lasting insulin, prostate cancer, cirrhosis of liver s/p liver transplant in 2018, and former tobacco use, who was admitted on 07/02/2025 with R ICA occlusion and fth acute on chronic infarct of R occipital lobe as well as multifocal subacute punctate infarcts involving multiple vascular territories bilaterally.     Patient does take Eliquis 5 mg daily and has not missed any doses. He has a home health nurse that manages his medications. The patient did not receive acute stroke treatment with either TNK or mechanical thrombectomy given unclear LKW and subacute infarcts on imaging. He was admitted to the stroke service for further workup given that his bilateral strokes cannot easily be explained by his R ICA occlusion. Etiology is most likely cardioembolic 2/2 atrial fibrillation, low EF vs hypercoagulable state possibly related to malignancy. We obtained CT abdomen/pelvis (recently had CT chest) without any  notable findings concerning for active malignancy. For now we will continue his eliquis. Will also reach out to cardiology regarding possible need for the addition of aspirin. Would also appreciate their input regarding cardiac CTA findings and if RASHAUN would be beneficial to better rule out cardiac thrombus, especially given his recent reduced EF per TTE. At this time, cardiology does not believe RASHAUN would be warranted, so we will hold off for now. They are planning to see formally .   - Cardiology consultation  - Cardiac CTA findings   - Newly reduced EF on TTE   - Possible need for ASA given cardiac hx   - AC selection if cardiac thrombus is present despite eliquis   - Need for RASHAUN? -> No for now  - Continue Eliquis 5mg BID   - LDL 48, continue atorvastatin 40mg  - A1c 8.5, will need PCP follow up for blood glucose management    Additional Hospital Problems:  #Hypertension  #Afib w/ RVR  Patient presented hypertensive at 180s/100s. Hydralazine, diltiazem, and metoprolol held on admission. During the night on , patient's blood pressure increased to 181/92. Hydralazine 25 mg and metoprolol 25 mg administered with reduction of blood pressure to 158/103. Patient resumed on home blood pressure medications given subacute nature of infarcts.   - continue metoprolol 25 mg BID  - diltiazem 120 mg every day  - hydralazine 25 mg BID  - PRN labetaolol and hydralazine for SBP >180    #T2DM  Glucose between 125-271. A1c 8.7. Patient on lantus 20 units at bedtime. Aspart added LDSSI before meals for better control.   - continue lantus 20 units at bedtime.  - hold PTA metformin    #HLD:  LDL 48. Continue atorvastatin 40 mg.    #Liver transplant (2018)  #Immunosuppressed status  Patient had a  donor liver transplant in 2018. Patient taking mycophenolate and prednisone without complications. No recent missed doses.   - continue mycophenolate 500 mg every day.  - prednisone 5 mg every day    #Prostate Cancer  -  CTM    #CKDII  Creatinine 1.10  - Daily BMP    #Hypothyroidism  -continue levothyroixine 150 mcgs    #CAD  - See above for cardiology consultation and question whether pt should be on ASA in addition to AC    DVT Prophylaxis: Already on Eliquis 5mg BID    Patient Follow-up    - Stroke follow up 6-8 weeks after discharge        Medically Ready for Discharge: Anticipated in 2-4 Days    The patient was discussed with stroke staff, Dr. Razo.    Salvatore Bauer MD  Neurology Resident, PGY-3  07/03/2025 10:20 PM       Physical Examination     Temp: 97.7  F (36.5  C) Temp src: Oral BP: 131/76 Pulse: 78   Resp: 18 SpO2: 97 % O2 Device: None (Room air)      General Exam  General:  patient lying in bed without any acute distress    HEENT:  normocephalic/atraumatic  Cardio:  irregularly irregular  Pulmonary:  no respiratory distress  Extremities:  no edema  Skin:  intact     Neuro Exam  Mental Status:  alert, oriented x 3, follows commands, speech clear and fluent, naming and repetition normal  Cranial Nerves:  visual fields intact, PERRL, EOMI with normal smooth pursuit, facial sensation intact and symmetric, facial movements symmetric, hearing not formally tested but intact to conversation, palate elevation symmetric and uvula midline, no dysarthria, shoulder shrug strong bilaterally, tongue protrusion midline  Motor:  normal muscle tone and bulk, no abnormal movements, able to move all limbs spontaneously, strength 5/5 throughout upper and lower extremities, no pronator drift  Reflexes:  2+ and symmetric throughout, no clonus, toes down-going  Sensory:  light touch sensation intact and symmetric throughout upper and lower extremities, no extinction on double simultaneous stimulation   Coordination:  normal finger-to-nose and heel-to-shin bilaterally without dysmetria  Station/Gait:  deferred    Stroke Scales       NIHSS  1a. Level of Consciousness 0-->Alert, keenly responsive   1b. LOC Questions 0-->Answers both  questions correctly   1c. LOC Commands 0-->Performs both tasks correctly   2.   Best Gaze 0-->Normal   3.   Visual 0-->No visual loss   4.   Facial Palsy 0-->Normal symmetrical movements   5a. Motor Arm, Left 0-->No drift, limb holds 90 (or 45) degrees for full 10 secs   5b. Motor Arm, Right 0-->No drift, limb holds 90 (or 45) degrees for full 10 secs   6a. Motor Leg, Left 0-->No drift, leg holds 30 degree position for full 5 secs   6b. Motor Leg, right 0-->No drift, leg holds 30 degree position for full 5 secs   7.   Limb Ataxia 0-->Absent   8.   Sensory 0-->Normal, no sensory loss   9.   Best Language 0-->No aphasia, normal   10. Dysarthria 0-->Normal   11. Extinction and Inattention  0-->No abnormality   Total 0 (07/02/25 1846)       Imaging/Labs   (Bolded imaging and labs new and/or personally reviewed or re-reviewed by me today)    MR Brain wo contrast 07/02/2025 Late subacute or chronic infarct in the right PCA territory involving  nearly the entire right occipital lobe. Superimposed acute on chronic  infarct in the right occipital lobe. In addition, there are scattered  punctate foci of diffusion signal abnormality with corresponding FLAIR  signal in different vascular territories involving right middle  cerebral artery, left posterior cerebral artery. Constellation of  findings are raising concern for a recent  infarct older then 6 hours.   Intracranial Vasculature  a. Occlusion of the intracranial portion of the right internal  carotid artery with collateral filling of the right middle cerebral  artery via the [place].   b. Moderate right P1 stenosis.   c. Mild mid basilar artery stenosis.   d. Moderate stenosis of the cavernous/clinoid segment of the left  internal carotid artery.   e. 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 Occlusion of the right internal carotid artery at the carotid  bulb.   b. Left internal carotid artery and vertebral arteries are patent.    CTA Heart Structure  1.   No left atrial appendage thrombus is seen. There is a filling  defect in the left atrial appendage on the arterial phase that is not  seen on venous phase. This is consistent with slow atrial flow rather  than the presence of atrial thrombus.   2.  The left atrial appendage has a  windsock morphology.  3.   Coronary images are non-diagnostic for stenosis/atherosclerosis  due to cardiac motion artifact. There are severe coronary artery  calcifications.   4.   The visualized aortic root, ascending aorta, and descending  thoracic aorta are normal in caliber.  5.   Moderate biatrial enlargement.   6.  Moderate mitral annular calcification.   CT Abdomen Pelvis No evidence of malignancy in the abdomen/pelvis.      Echocardiogram  Left ventricular function is decreased. The ejection fraction is 35-40%  (moderately reduced). Moderate diffuse hypokinesis.  Right ventricle is not well visualized.  This study was compared with the study from 3/8/25. LVEF is lower.  Message sent to ordering physician.   EKG  Atrial fibrillation   Moderate voltage criteria for LVH, may be normal variant ( R in aVL , Evan product )   ST & T wave abnormality, consider lateral ischemia   Abnormal ECG      LDL  7/2/2025: 48 mg/dL   A1C  6/3/2025: 8.6 %  7/2/2025: 8.7 %   Troponin 7/2/2025: 30 ng/L     Other labs reviewed by me today:  Recent Results (from the past 24 hours)   Glucose by meter    Collection Time: 07/02/25 10:53 PM   Result Value Ref Range    GLUCOSE BY METER POCT 265 (H) 70 - 99 mg/dL   EKG 12-lead, tracing only    Collection Time: 07/03/25 12:34 AM   Result Value Ref Range    Systolic Blood Pressure  mmHg    Diastolic Blood Pressure  mmHg    Ventricular Rate 64 BPM    Atrial Rate  BPM    MS Interval  ms    QRS Duration 92 ms     ms    QTc 433 ms    P Axis  degrees    R AXIS -21 degrees    T Axis 142 degrees    Interpretation ECG       Atrial fibrillation  Moderate voltage criteria for LVH, may be  normal variant ( R in aVL , Evan product )  ST & T wave abnormality, consider lateral ischemia  Abnormal ECG    Unconfirmed report - interpretation of this ECG is computer generated - see medical record for final interpretation     CBC with platelets    Collection Time: 07/03/25  6:28 AM   Result Value Ref Range    WBC Count 5.8 4.0 - 11.0 10e3/uL    RBC Count 5.39 4.40 - 5.90 10e6/uL    Hemoglobin 15.3 13.3 - 17.7 g/dL    Hematocrit 48.0 40.0 - 53.0 %    MCV 89 78 - 100 fL    MCH 28.4 26.5 - 33.0 pg    MCHC 31.9 31.5 - 36.5 g/dL    RDW 13.1 10.0 - 15.0 %    Platelet Count 224 150 - 450 10e3/uL   Basic metabolic panel    Collection Time: 07/03/25  6:28 AM   Result Value Ref Range    Sodium 139 135 - 145 mmol/L    Potassium 4.3 3.4 - 5.3 mmol/L    Chloride 105 98 - 107 mmol/L    Carbon Dioxide (CO2) 26 22 - 29 mmol/L    Anion Gap 8 7 - 15 mmol/L    Urea Nitrogen 21.4 8.0 - 23.0 mg/dL    Creatinine 1.10 0.67 - 1.17 mg/dL    GFR Estimate 71 >60 mL/min/1.73m2    Calcium 8.8 8.8 - 10.4 mg/dL    Glucose 116 (H) 70 - 99 mg/dL   Magnesium    Collection Time: 07/03/25  6:28 AM   Result Value Ref Range    Magnesium 1.9 1.7 - 2.3 mg/dL   Phosphorus    Collection Time: 07/03/25  6:28 AM   Result Value Ref Range    Phosphorus 3.8 2.5 - 4.5 mg/dL   Glucose by meter    Collection Time: 07/03/25  8:07 AM   Result Value Ref Range    GLUCOSE BY METER POCT 125 (H) 70 - 99 mg/dL   Echo Limited    Collection Time: 07/03/25  9:44 AM   Result Value Ref Range    LVEF  35-40% (moderately reduced)    Glucose by meter    Collection Time: 07/03/25 12:15 PM   Result Value Ref Range    GLUCOSE BY METER POCT 271 (H) 70 - 99 mg/dL   Glucose by meter    Collection Time: 07/03/25  5:22 PM   Result Value Ref Range    GLUCOSE BY METER POCT 309 (H) 70 - 99 mg/dL   Glucose by meter    Collection Time: 07/03/25  9:41 PM   Result Value Ref Range    GLUCOSE BY METER POCT 119 (H) 70 - 99 mg/dL

## 2025-07-03 NOTE — PLAN OF CARE
SLP: Per discussion with MD, pt passed nursing swallow screen and speech has returned to baseline. SLP consult not indicated; will defer and complete orders.

## 2025-07-04 VITALS
OXYGEN SATURATION: 98 % | SYSTOLIC BLOOD PRESSURE: 129 MMHG | RESPIRATION RATE: 18 BRPM | TEMPERATURE: 98.2 F | HEART RATE: 54 BPM | DIASTOLIC BLOOD PRESSURE: 81 MMHG

## 2025-07-04 LAB
ANION GAP SERPL CALCULATED.3IONS-SCNC: 12 MMOL/L (ref 7–15)
BUN SERPL-MCNC: 23.3 MG/DL (ref 8–23)
CALCIUM SERPL-MCNC: 9 MG/DL (ref 8.8–10.4)
CHLORIDE SERPL-SCNC: 104 MMOL/L (ref 98–107)
CREAT SERPL-MCNC: 1.07 MG/DL (ref 0.67–1.17)
EGFRCR SERPLBLD CKD-EPI 2021: 74 ML/MIN/1.73M2
GLUCOSE BLDC GLUCOMTR-MCNC: 292 MG/DL (ref 70–99)
GLUCOSE BLDC GLUCOMTR-MCNC: 87 MG/DL (ref 70–99)
GLUCOSE SERPL-MCNC: 249 MG/DL (ref 70–99)
HCO3 SERPL-SCNC: 23 MMOL/L (ref 22–29)
POTASSIUM SERPL-SCNC: 4.1 MMOL/L (ref 3.4–5.3)
SODIUM SERPL-SCNC: 139 MMOL/L (ref 135–145)

## 2025-07-04 PROCEDURE — 99254 IP/OBS CNSLTJ NEW/EST MOD 60: CPT | Mod: GC | Performed by: INTERNAL MEDICINE

## 2025-07-04 PROCEDURE — 99239 HOSP IP/OBS DSCHRG MGMT >30: CPT | Mod: GC | Performed by: STUDENT IN AN ORGANIZED HEALTH CARE EDUCATION/TRAINING PROGRAM

## 2025-07-04 PROCEDURE — 36415 COLL VENOUS BLD VENIPUNCTURE: CPT

## 2025-07-04 PROCEDURE — 250N000013 HC RX MED GY IP 250 OP 250 PS 637

## 2025-07-04 PROCEDURE — 80048 BASIC METABOLIC PNL TOTAL CA: CPT

## 2025-07-04 PROCEDURE — 250N000012 HC RX MED GY IP 250 OP 636 PS 637

## 2025-07-04 RX ORDER — LOSARTAN POTASSIUM 25 MG/1
25 TABLET ORAL DAILY
Status: DISCONTINUED | OUTPATIENT
Start: 2025-07-04 | End: 2025-07-04 | Stop reason: HOSPADM

## 2025-07-04 RX ORDER — OXYCODONE HYDROCHLORIDE 5 MG/1
5 TABLET ORAL 2 TIMES DAILY PRN
Refills: 0 | Status: DISCONTINUED | OUTPATIENT
Start: 2025-07-04 | End: 2025-07-04 | Stop reason: HOSPADM

## 2025-07-04 RX ORDER — ASPIRIN 81 MG/1
81 TABLET, CHEWABLE ORAL DAILY
Qty: 30 TABLET | Refills: 0 | Status: SHIPPED | OUTPATIENT
Start: 2025-07-04

## 2025-07-04 RX ORDER — ASPIRIN 81 MG/1
81 TABLET, CHEWABLE ORAL DAILY
Status: DISCONTINUED | OUTPATIENT
Start: 2025-07-04 | End: 2025-07-04 | Stop reason: HOSPADM

## 2025-07-04 RX ORDER — NALOXONE HYDROCHLORIDE 0.4 MG/ML
0.2 INJECTION, SOLUTION INTRAMUSCULAR; INTRAVENOUS; SUBCUTANEOUS
Status: DISCONTINUED | OUTPATIENT
Start: 2025-07-04 | End: 2025-07-04 | Stop reason: HOSPADM

## 2025-07-04 RX ORDER — NALOXONE HYDROCHLORIDE 0.4 MG/ML
0.4 INJECTION, SOLUTION INTRAMUSCULAR; INTRAVENOUS; SUBCUTANEOUS
Status: DISCONTINUED | OUTPATIENT
Start: 2025-07-04 | End: 2025-07-04 | Stop reason: HOSPADM

## 2025-07-04 RX ORDER — LOSARTAN POTASSIUM 25 MG/1
25 TABLET ORAL DAILY
Qty: 30 TABLET | Refills: 0 | Status: SHIPPED | OUTPATIENT
Start: 2025-07-04

## 2025-07-04 RX ADMIN — OXYCODONE HYDROCHLORIDE 5 MG: 5 TABLET ORAL at 00:31

## 2025-07-04 RX ADMIN — MYCOPHENOLATE MOFETIL 500 MG: 500 TABLET, FILM COATED ORAL at 08:52

## 2025-07-04 RX ADMIN — OXYCODONE HYDROCHLORIDE 5 MG: 5 TABLET ORAL at 08:53

## 2025-07-04 RX ADMIN — ASPIRIN 81 MG CHEWABLE TABLET 81 MG: 81 TABLET CHEWABLE at 16:48

## 2025-07-04 RX ADMIN — METOPROLOL SUCCINATE 25 MG: 25 TABLET, EXTENDED RELEASE ORAL at 08:53

## 2025-07-04 RX ADMIN — LEVOTHYROXINE SODIUM 150 MCG: 0.15 TABLET ORAL at 08:53

## 2025-07-04 RX ADMIN — DILTIAZEM HYDROCHLORIDE 120 MG: 120 CAPSULE, COATED, EXTENDED RELEASE ORAL at 08:53

## 2025-07-04 RX ADMIN — ATORVASTATIN CALCIUM 40 MG: 40 TABLET, FILM COATED ORAL at 08:52

## 2025-07-04 RX ADMIN — APIXABAN 5 MG: 5 TABLET, FILM COATED ORAL at 08:53

## 2025-07-04 RX ADMIN — LOSARTAN POTASSIUM 25 MG: 25 TABLET, FILM COATED ORAL at 16:48

## 2025-07-04 RX ADMIN — HYDRALAZINE HYDROCHLORIDE 25 MG: 25 TABLET ORAL at 08:53

## 2025-07-04 RX ADMIN — PREDNISONE 5 MG: 5 TABLET ORAL at 08:54

## 2025-07-04 ASSESSMENT — ACTIVITIES OF DAILY LIVING (ADL)
ADLS_ACUITY_SCORE: 59
DEPENDENT_IADLS:: CLEANING
ADLS_ACUITY_SCORE: 59

## 2025-07-04 NOTE — PLAN OF CARE
Goal Outcome Evaluation:      Plan of Care Reviewed With: patient    Overall Patient Progress: no changeOverall Patient Progress: no change           Status: Hx HTN, CAD, N-STEMI in 3/2025, a-fib with RVR on Eliquis, T2DM, prostate cancer, cirrhosis of liver s/p liver transplant in 2018, tobacco use. Presented to the ED 7/2 after experiencing intermittent headache, blurry vision, and temporary changes in speech occurring in the last 2-3 weeks.   Vitals: HTN within parameters. CCM - known afib. Bradycardic but not sustaining.  Neuros: A&Ox4. 5/5. Baseline N/T to BLE (R>L). [place]ic speaking.  IV: PIV SL  Resp: LSC  Diet: Regular  GI: LBM PTA passing flatus  : VDSP  Skin: Intact  Pain: PRN oxycodone given for HA  Activity: SBA, walker  Social: wife at bedside  Plan: Cont to monitor and follow POC. Cards consult.  Education completed: Need stroke edu

## 2025-07-04 NOTE — PLAN OF CARE
Goal Outcome Evaluation:      Plan of Care Reviewed With: patient    Overall Patient Progress: improvingOverall Patient Progress: improving    Outcome Evaluation: in a.fib    Status: Hx HTN, CAD, N-STEMI in 3/2025, a-fib with RVR on Eliquis, T2DM, prostate cancer, cirrhosis of liver s/p liver transplant in 2018, tobacco use. Presented to the ED 7/2 after experiencing intermittent headache, blurry vision, and temporary changes in speech occurring in the last 2-3 weeks.   Vitals: HTN within parameters. CCM - known afib  Neuros: A&Ox4. 5/5. Baseline N/T to BLE (R>L). [place] speaking.  IV: PIV SL  Resp: LSC  Diet: Regular  GI: LBM PTA passing flatus  : VDSP  Skin: Intact  Pain: PRN oxycodone given for HA  Activity: SB in halls, independent in room.   Social: wife at bedside.  used.  Plan: Cont to monitor and follow POC. Cards consult.  Education completed: Need stroke edu

## 2025-07-04 NOTE — DISCHARGE INSTRUCTIONS
Please call [employer] transportation (  [phone]) and verify, if you have transportation benefits for round trip for medical appointments. To schedule you will need to call them in advance during business hours.

## 2025-07-04 NOTE — PLAN OF CARE
Discharge time/date: 1812 7/4  Walked or Wheelchair: walked  PIV removed: yes  Reviewed AVS with patient: yes  Medication due times added to AVS in EPIC: yes  Verbalized understanding of discharge with teachback: yes  Medications retrieved from pharmacy: no  at home pharmacy   Supplies sent home: no  Belongings from security with patient: no

## 2025-07-04 NOTE — DISCHARGE SUMMARY
[facility]    Neurology Stroke Discharge Summary    Date of Admission: 7/2/2025  Date of Discharge: 07/04/2025    Disposition: Discharged to home  Primary Care Physician: Jaswinder Anne       Admission Diagnosis     #Acute on chronic ischemic infarct of right occipital lobe  #Subacute scattered punctate infarcts involving multiple vascular territories (R & L MCA, L PCA)   #R ICA occlusion  #Atrial fibrillation w/ RVR on eliquis  #Atrial filling defect on cardiac CTA   #Hypertension  #Afib w/ RVR  #T2DM  #HLD  #Liver transplant (2018)  #Immunosuppressed status  #Prostate cancer  #CKDII  #Hypothyroidism  #CAD      Discharge Diagnosis     #Acute on chronic ischemic infarct of right occipital lobe  #Subacute scattered punctate infarcts involving multiple vascular territories (R & L MCA, L PCA)   #R ICA occlusion  #Atrial fibrillation w/ RVR on eliquis  #Atrial filling defect on cardiac CTA, not c/w thrombus  #Hypertension  #Afib w/ RVR  #T2DM  #HLD  #Liver transplant (2018)  #Immunosuppressed status  #Prostate cancer  #CKDII  #Hypothyroidism  #CAD     Critical Post-hospital Follow-up     - Follow-up with primary care within 1 week post hospitalization   - For A1c/diabetes management  - Follow-up with - In 6-8 weeks with stroke neurology  - Follow-up with cardiology [facility] (referral placed)  - Follow-up with Dr. Rakesh Patterson  - Follow-up with established outpatient cardiologist  - Pending/recommended tests: none     Stroke Evaluation and Management Summarized     Suspected Etiology ESUS vs cardioembolism   Stroke Risk Factors hypertension, hyperlipidemia, diabetes, atrial fibrillation, and atherosclerosis    Antithrombotic plan aspirin 81 mg and apixaban (Eliquis) 5mg BID   Antihyperlipidemic plan LDL goal < 70.  Continue prior to admission statin: atorvastatin 40mg    Blood pressure plan Long term goal 130/80.  Medication Hydralazine 25mg BID, Losartan  25mg daily, metoprolol succinate 25mg daily.  PCP and cardiology to titrate.   Antihyperglycemic plan Long term, A1C <7%. Medication resume PTA metformin, continue PTA Lantus.  PCP/Endocrine to titrate.   Cardiac monitoring N/A. Known a-fib with RVR   Smoking already quit smoking   Diet Regular   Exercise Specific recommendation from [employer]   Please see body of note for further details. This table may not include important management points.    MRI Brain wo contrast 07/02/2025 Late subacute or chronic infarct in the right PCA territory involving  nearly the entire right occipital lobe. Superimposed acute on chronic  infarct in the right occipital lobe. In addition, there are scattered  punctate foci of diffusion signal abnormality with corresponding FLAIR  signal in different vascular territories involving right middle  cerebral artery, left posterior cerebral artery. Constellation of  findings are raising concern for a recent  infarct older then 6 hours.   Intracranial Vasculature  a. Occlusion of the intracranial portion of the right internal  carotid artery with collateral filling of the right middle cerebral  artery via the [place].   b. Moderate right P1 stenosis.   c. Mild mid basilar artery stenosis.   d. Moderate stenosis of the cavernous/clinoid segment of the left  internal carotid artery.   e. 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 Occlusion of the right internal carotid artery at the carotid  bulb.   b. Left internal carotid artery and vertebral arteries are patent.     Echocardiogram Left ventricular function is decreased. The ejection fraction is 35-40%  (moderately reduced). Moderate diffuse hypokinesis.  Right ventricle is not well visualized.  This study was compared with the study from 3/8/25. LVEF is lower.      EKG/Telemetry Atrial fibrillation   Moderate voltage criteria for LVH, may be normal variant ( R in aVL , Evan product )   ST & T wave  "abnormality, consider lateral ischemia   Abnormal ECG         CTA Heart Structure  1.   No left atrial appendage thrombus is seen. There is a filling  defect in the left atrial appendage on the arterial phase that is not  seen on venous phase. This is consistent with slow atrial flow rather  than the presence of atrial thrombus.   2.  The left atrial appendage has a  windsock morphology.  3.   Coronary images are non-diagnostic for stenosis/atherosclerosis  due to cardiac motion artifact. There are severe coronary artery  calcifications.   4.   The visualized aortic root, ascending aorta, and descending  thoracic aorta are normal in caliber.  5.   Moderate biatrial enlargement.   6.  Moderate mitral annular calcification.   CT Abdomen Pelvis No evidence of malignancy in the abdomen/pelvis.        LDL  7/2/2025: 48 mg/dL   A1C  6/3/2025: 8.6 %  7/2/2025: 8.7 %   Troponin 7/2/2025: 30 ng/L        Problem Leading to Hospitalization ([facility] [place])     Loy Limon is a 70 year old male with a past medical history significant for HTN, CAD, N-STEMI in 3/2025, a-fib with RVR on Eliquis, T2DM on long-lasting insulin, prostate cancer, cirrhosis of liver s/p liver transplant in 2018, and former tobacco use, who presented to the ED today after experiencing intermittent headache, blurry vision, and temporary changes in speech occurring in the last 2-3 weeks. Two days prior to presentation, the headache at the back of his head increased in intensity. He developed dizziness and numbness/tingling in his bilateral fingers and feet. The patient was initially unable to move his arms or legs and \"his body was not responding\", it resolved after about 3 minutes. He felt unstable when walking and was afraid to drive due to his symptoms. He also experienced episodes where he did not know where he was and felt like \"his mind went away\" for a few seconds. During these episodes, his tongue \"felt heavy\" and his wife endorsed slurred speech " for only a few minutes.      A stroke code was not called when he arrived to the ED due to the timing of symptoms, however a consult was placed after imaging revealed findings indicative of subacute infarct.     NIHSS score of 0 at the time of initial evaluation. BP has been elevated in the 180s/100s. Patient has been in A-Fib on telemetry with HR in the 90s. CT Head wo contrast showed right occipital lobe subacute-chronic infarct, but no acute intracranial hemorrhage. CTA head and neck with contrast showed new occlusion of right internal carotid artery with collateral filling of the right MCA, as well as stenosis of other intracranial vessels. The patient was admitted to the vascular neurology service for further workup of since patient endorses Eliquis compliance and not missing any doses.  In fact, he reports having a home health nurse who sets up his medications weekly.    Please see H&P dated 7/2/2025 for further details about presentation.     Brief Hospital Course     As further described in the H&P, patient presented with intermittent headache and episodes of dizziness.  Found to have Acute on chronic ischemic infarct of right occipital lobe, Subacute scattered punctate infarcts involving multiple vascular territories (R & L MCA, L PCA), and a R ICA occlusion. Last known well was >24h prior to presentation so neither  IV thrombolysis or mechanical thrombectomy were indicated.  Please see stroke work-up summarized above, with this in mind etiology is felt to be ESUS vs cardioembolism.     Hospital course notable for multiple episodes of asymptomatic Vtach and a cardiology consultation due to further reduced EF on TTE.  Complications: None.  Please see problem based course below for further details.      During their stay, patient was seen by No rehab services required due to lack of ongoing deficit Patient was ultimately discharged to home in stable condition. NIHSS of 0 (07/04/25).      Prior to discharge,  stroke education was discussed with patient and family on blood pressure management, cholesterol management, medical management, follow-up recommendations/plan, and 911 call if warning signs of stroke.       Problem-Based Hospital Course and Plan     #Acute on chronic ischemic infarct of right occipital lobe  #Subacute scattered punctate infarcts involving multiple vascular territories (R & L MCA, L PCA)   #R ICA occlusion  #Atrial fibrillation w/ RVR on eliquis  #Atrial filling defect on cardiac CTA     Loy Limon is a 70 year old male with a past medical history significant for HTN, CAD, N-STEMI in 3/2025, a-fib with RVR on Eliquis, T2DM on long-lasting insulin, prostate cancer, cirrhosis of liver s/p liver transplant in 2018, and former tobacco use, who was admitted on 07/02/2025 with R ICA occlusion and fth acute on chronic infarct of R occipital lobe as well as multifocal subacute punctate infarcts involving multiple vascular territories bilaterally.      Patient does take Eliquis 5 mg daily and has not missed any doses. He has a home health nurse that manages his medications. The patient did not receive acute stroke treatment with either TNK or mechanical thrombectomy given unclear LKW and subacute infarcts on imaging. He was admitted to the stroke service for further workup given that his bilateral strokes cannot easily be explained by his R ICA occlusion. Etiology is most likely cardioembolic 2/2 atrial fibrillation, low EF vs hypercoagulable state possibly related to malignancy. We obtained CT abdomen/pelvis (recently had CT chest) without any notable findings concerning for active malignancy. We will continue his eliquis. Cardiology was consulted during the hospital for a number of questions including need for aspirin, cardiac CTA findings, recent reduced EF. They provided very helpful information and recommendations during the hospitalization and for follow up as listed below. The patient was  started on losartan and aspirin and discharged in stable condition with plans for both stroke neurology and cardiology follow up.        - Cardiology consultation  Recommendations:   Continue PTA Apixaban 5 mg BID  No current indication for RASHAUN given Cardiac CT finding showing no HEATHER thrombus (discussed this also with Cardiac CT reader Dr. Goldberg)  GDMT:   BB: continue PTA metoprolol succinate 25 mg daily. Of note, there is no mortality benefit with HFrEF and beta blockers in patients with Atrial Fibrillation  ACE/ARB/ARNI: can start Losartan 25 mg daily for now, with plans to switch to Entresto 24-26 mg BID as an outpatient  MRA: Can start Spironolactone 25 mg daily as an outpatient (noted that potassium is >4)  SGLT2i: Defer to outpatient to start Empagliflozin 10 mg daily  Please send pharmacy liaison order for Entresto and Empagliflozin to ensure this would be affordable for the patient  Continue remainder of PTA cardiac medications: Atorvastatin 40 mg daily, Diltiazem 120 mg daily, Hydralazine 25 mg BID  Recommend daily ASA 81 mg if no contraindications from GI or kidney perspective; if patient will be on Eliquis and ASA, he may need GI ulcer ppx (with famotidine or a PPI such as pantoprazole)  At the time of discharge, patient can follow up at [facility] (cardiology heart failure clinic), this referral can be placed at discharge. Given signs of hypertrophic cardiomyopathy on CMR, can also follow up with Dr. Rakesh Patterson. Patient already mentioned that he will continue seeing his outpatient cardiologist, which is reasonable as well.    - Continue Eliquis 5mg BID   - LDL 48, continue atorvastatin 40mg  - A1c 8.5, will need PCP follow up for blood glucose management       Additional Hospital Problems:  #Hypertension  #Afib w/ RVR  Patient presented hypertensive at 180s/100s. Hydralazine, diltiazem, and metoprolol held on admission. During the night on 07/02, patient's blood pressure increased to 181/92.  "Patient resumed on home blood pressure medications given subacute nature of infarcts. Started on losartan per cardiology prior to discharge and tolerated well.   - continue metoprolol 25 mg BID  - continue diltiazem 120 mg every day  - continue hydralazine 25 mg BID  - continue losartan 25mg daily     #T2DM  Glucose between 125-271. A1c 8.7. Patient on lantus 20 units at bedtime and metformin. Metformin was held at admission and replaced with LDSSI. At discharge we resumed his metformin but he will need PCP follow up for BG control given A1c of 8.7.    - continue lantus 20 units at bedtime.  - continue PTA metformin     #HLD:  LDL 48. Continue atorvastatin 40 mg.     #Liver transplant (2018)  #Immunosuppressed status  Patient had a  donor liver transplant in 2018. Patient taking mycophenolate and prednisone without complications. No recent missed doses. Continued at discharge.   - continue mycophenolate 500 mg every day.  - continue prednisone 5 mg every day     #Prostate Cancer  - follow up as needed with pcp     #CKDII  Creatinine 1.10. follow up labs this admission were stable.        #Hypothyroidism  -continue levothyroixine 150 mcgs     #CAD  - See above for cardiology consultation  - Started on aspirin 81mg     The plan of care as outlined above was discussed and developed with patient and family.      Pertinent Investigations and Findings   Please additionally see \"Stroke Evaluation and Management Summarized\" above.    Labs:  INR:   Recent Labs   Lab 25  0829   INR 1.09      Coag Panel / Hypercoag Workup: Pt already on eliquis  CSF Analysis: Not indicated  Autoimmune Labs: Not indicated    Procedures:   Endovascular procedure: None     Imaging  MR [id number]:   MR Brain w/o Contrast 2025    Narrative  EXAM: MR BRAIN W/O CONTRAST  2025 5:28 PM    HISTORY: dizziness ha noted subacute infarct on CT    COMPARISON: Stable head CT and brain MRI from 2024    TECHNIQUE: Multiplanar, " multisequence MR imaging of the head without  intravenous contrast    CONTRAST: None.    FINDINGS:  Corresponding to the gray-white matter differentiation loss and  subacute/chronic infarct described in same-day head CT, there is  significant FLAIR hyperintensity with sulcal effacement in the right  posterior cerebral artery territory in the right occipital and right  posterior temporal lobe. These areas are demonstrating gyral  susceptibility artifacts consistent with cortical laminar necrosis,  suggesting that this infarct territory  is mostly late subacute or  chronic in nature with T2 shine through effect on DWI. However in the  most medial aspect of right occipital lobe there is focal diffusion  restriction suggestive of acute on chronic infarct. There are  additional scattered punctate foci of diffusion signal in the  bilateral high corona radiata, right precentral gyrus, right ventral  subcortical frontal white matter and left temporal/occipital region  with corresponding FLAIR hyperintensity, suggestive of focal acute  infarcts more than 6 hours old. In addition, there are multiple  scattered punctate susceptibility artifacts in the subcortical and  deep white matter.    Old small hemorrhagic lacunar infarct in the posterior superior aspect  of right cerebellum.    No midline shift. No acute intracranial hemorrhage. Generalized  cerebral volume loss. Proportionate enlargement of lateral ventricles  not reaching to the degree of acute hydrocephalus. Normal marrow  signal. Foramen magnum is unremarkable. Normal soft tissues.    Impression  IMPRESSION:  Late subacute or chronic infarct in the right PCA territory involving  nearly the entire right occipital lobe. Superimposed acute on chronic  infarct in the right occipital lobe. In addition, there are scattered  punctate foci of diffusion signal abnormality with corresponding FLAIR  signal in different vascular territories involving right middle  cerebral  artery, left posterior cerebral artery. Constellation of  findings are raising concern for a recent  infarct older then 6 hours.    I have personally reviewed the examination and initial interpretation  and I agree with the findings.    IAN WISDOM MD      SYSTEM ID:  [id number]        PHYSICAL EXAMINATION  Vital Signs:  B/P: 160/80, T: 97.9, P: 62, R: 16    General Exam  General:  patient lying in bed without any acute distress    HEENT:  normocephalic/atraumatic  Cardio:  irregularly irregular  Pulmonary:  no respiratory distress  Extremities:  no edema  Skin:  intact      Neuro Exam  Mental Status:  alert, oriented x 3, follows commands, speech clear and fluent, naming and repetition normal  Cranial Nerves:  visual fields intact, PERRL, EOMI with normal smooth pursuit, facial sensation intact and symmetric, facial movements symmetric, hearing not formally tested but intact to conversation, palate elevation symmetric and uvula midline, no dysarthria, shoulder shrug strong bilaterally, tongue protrusion midline  Motor:  normal muscle tone and bulk, no abnormal movements, able to move all limbs spontaneously, strength 5/5 throughout upper and lower extremities, no pronator drift  Reflexes:  2+ and symmetric throughout, no clonus, toes down-going  Sensory:  light touch sensation intact and symmetric throughout upper and lower extremities, no extinction on double simultaneous stimulation   Coordination:  normal finger-to-nose and heel-to-shin bilaterally without dysmetria  Station/Gait:  deferred    National Institutes of Health Stroke Scale (on day of discharge)  NIHSS Total Score: 0    Modified [place] Score (Discharge)  1-No significant disability despite symptoms    Medications       Review of your medicines        START taking        Dose / Directions   aspirin 81 MG chewable tablet  Commonly known as: ASA  Used for: Acute ischemic stroke (H)      Dose: 81 mg  Take 1 tablet (81 mg) by mouth daily.  Quantity: 30  tablet  Refills: 0     losartan 25 MG tablet  Commonly known as: COZAAR  Used for: Acute ischemic stroke (H)      Dose: 25 mg  Take 1 tablet (25 mg) by mouth daily.  Quantity: 30 tablet  Refills: 0            CONTINUE these medicines which have NOT CHANGED        Dose / Directions   apixaban ANTICOAGULANT 5 MG tablet  Commonly known as: ELIQUIS      Dose: 5 mg  Take 1 tablet (5 mg) by mouth 2 times daily.  Quantity: 180 tablet  Refills: 3     atorvastatin 40 MG tablet  Commonly known as: LIPITOR  Used for: DM type 2, goal HbA1c 7%-8% (H)      Dose: 40 mg  Take 1 tablet (40 mg) by mouth daily.  Quantity: 90 tablet  Refills: 2     diltiazem ER COATED BEADS 120 MG 24 hr capsule  Commonly known as: CARDIZEM CD/CARTIA XT  Used for: Chronic atrial fibrillation (H)      Dose: 120 mg  Take 1 capsule (120 mg) by mouth daily.  Quantity: 90 capsule  Refills: 3     hydrALAZINE 25 MG tablet  Commonly known as: APRESOLINE  Used for: Primary hypertension      Dose: 25 mg  Take 1 tablet (25 mg) by mouth 2 times daily.  Quantity: 180 tablet  Refills: 3     Lantus SoloStar 100 UNIT/ML soln  Used for: Type 2 diabetes mellitus with other specified complication, with long-term current use of insulin (H)  Generic drug: insulin glargine      Dose: 20 Units  Inject 20 Units subcutaneously at bedtime.  Quantity: 15 mL  Refills: 1     levothyroxine 150 MCG tablet  Commonly known as: SYNTHROID/LEVOTHROID  Used for: Other specified hypothyroidism      Dose: 150 mcg  Take 1 tablet (150 mcg) by mouth daily.  Quantity: 90 tablet  Refills: 3     metFORMIN 500 MG 24 hr tablet  Commonly known as: GLUCOPHAGE XR  Used for: Type 2 diabetes mellitus with other specified complication, with long-term current use of insulin (H)      Dose: 1,000 mg  Take 2 tablets (1,000 mg) by mouth 2 times daily (with meals).  Quantity: 360 tablet  Refills: 3     metoprolol succinate ER 25 MG 24 hr tablet  Commonly known as: TOPROL XL  Used for: Atrial fibrillation with  rapid ventricular response (H), Tachycardia      Dose: 25 mg  Take 1 tablet (25 mg) by mouth daily.  Quantity: 90 tablet  Refills: 3     mycophenolate 500 MG tablet  Commonly known as: GENERIC EQUIVALENT  Used for: Liver replaced by transplant (H)      Dose: 500 mg  Take 1 tablet (500 mg) by mouth 2 times daily  Quantity: 180 tablet  Refills: 3     predniSONE 5 MG tablet  Commonly known as: DELTASONE  Used for: Liver transplanted (H)      Dose: 5 mg  Take 1 tablet (5 mg) by mouth daily.  Quantity: 90 tablet  Refills: 1     Vitamin D3 25 mcg (1000 units) tablet  Commonly known as: CHOLECALCIFEROL      Dose: 2,000 Units  Take 2,000 Units by mouth daily.  Refills: 0               Where to get your medicines        These medications were sent to [facility] ([facility]) #[id number] - [place], [place] - [address]  [address] [place], [facility] [address]      Phone: [phone]   aspirin 81 MG chewable tablet  losartan 25 MG tablet         Additional recommendations and follow up:       Primary Care - Care Coordination Referral      Adult Cardiology Eval  Referral      Adult Neurology  Referral      Reason for your hospital stay    You were in the hospital because you had multiple strokes that may have been the source of your dizziness and headache. We looked for blood clots in your heart that may have caused the strokes, and there were none. We found that you have a blocked artery in your neck, but we don't think that this explains your new strokes. We are still not sure what caused your stroke, but we would like you to continue taking your blood thinners to prevent more from happening. We would like to see you in Neurology Stroke clinic in 6-8 weeks.     We also had you talk to cardiology, who wanted you to start Aspirin and Losartan, and see them in their clinics.    Call 911 and return to the hospital if you suddenly develop severe balance  issues, changes in your vision, drooping of one side of the face, weakness in 1 arm or limb on one side of the body, difficulty speaking or understanding speech, or changes in your sensation.     Estuvo hospitalizado debido a múltiples accidentes cerebrovasculares que podrían lizandro causado mareos y dolor de suresh. Buscamos coágulos de natali en samaniego corazón que pudieran lizandro causado los accidentes cerebrovasculares, terry no encontramos ninguno. Descubrimos que tiene dyana arteria obstruida en el gus, terry no creemos que esto explique nikhil nuevos accidentes cerebrovasculares. Aún no estamos seguros de la causa de samaniego accidente cerebrovascular, terry nos gustaría que continuara tomando nikhil anticoagulantes para prevenir más. Nos gustaría verlo en la clínica de accidentes cerebrovasculares de Neurología en 6 a 8 semanas.    También le indicamos que hablara con cardiología, quienes le indicaron que comenzara a cherrie aspirina y losartán, y que los revisara en nikhil clínicas.    Llame al 911 y regrese al hospital si repentinamente presenta problemas graves de equilibrio, cambios en la visión, caída de un lado de la mely, debilidad en un brazo o extremidad de un lado del cuerpo, dificultad para hablar o comprender lo que se le dice, o cambios en la sensibilidad.     Activity    Your activity upon discharge: activity as tolerated     ADULT [facility] Specialty Follow-up and recommended labs and tests    - Follow-up with primary care within 1 week post hospitalization              - For A1c/diabetes management  - Follow-up with - In 6-8 weeks with stroke neurology  - Follow-up with cardiology [facility] (referral placed)  - Follow-up with Dr. Rakesh Patterson  - Follow-up with established outpatient cardiologist    Appointments on [place] and/or [facility] (with [facility] or [facility] provider or service). Call [phone] if you haven't heard regarding these appointments within 7 days of discharge.     Diet    Follow this diet  upon discharge: Current Diet:Orders Placed This Encounter      Regular Diet Adult     Hospital Follow-up with Existing Primary Care Provider (PCP)            Patient was seen and discussed with the Attending, Dr. Razo.    Salvatore Bauer MD  Neurology Resident, PGY-3  07/04/2025 10:15 PM

## 2025-07-04 NOTE — CONSULTS
Care Management Initial Consult    General Information  Assessment completed with: Patient, Spouse or significant other, Loy and wife Drea  Type of CM/SW Visit: Initial Assessment    Primary Care Provider verified and updated as needed: Yes   Readmission within the last 30 days: no previous admission in last 30 days      Reason for Consult: discharge planning  Advance Care Planning: Advance Care Planning Reviewed: no concerns identified          Communication Assessment  Patient's communication style: spoken language (non-English)             Cognitive  Cognitive/Neuro/Behavioral: WDL  Level of Consciousness: alert  Arousal Level: opens eyes spontaneously  Orientation: oriented x 4     Best Language: 0 - No aphasia  Speech: clear, spontaneous, logical    Living Environment:   People in home: spouse  Drea  Current living Arrangements: apartment      Able to return to prior arrangements: yes       Family/Social Support:  Care provided by: self, spouse/significant other  Provides care for: no one  Marital Status:   Support system: Wife, Children  Drea       Description of Support System: Supportive, Involved    Support Assessment: Adequate family and caregiver support, Adequate social supports    Current Resources:   Patient receiving home care services: Yes  Skilled Home Care Services: Skilled Nursing     Community Resources: Home Care  Equipment currently used at home: none  Supplies currently used at home: Incontinence Supplies    Employment/Financial:  Employment Status: retired        Financial Concerns: none   Referral to Financial Worker: No       Does the patient's insurance plan have a 3 day qualifying hospital stay waiver?  No    Lifestyle & Psychosocial Needs:  Social Drivers of Health     Food Insecurity: Low Risk  (3/8/2025)    Food Insecurity     Within the past 12 months, did you worry that your food would run out before you got money to buy more?: No     Within the past 12 months,  did the food you bought just not last and you didn t have money to get more?: No   Depression: Not at risk (1/14/2025)    PHQ-2     PHQ-2 Score: 0   Housing Stability: High Risk (3/8/2025)    Housing Stability     Do you have housing? : No     Are you worried about losing your housing?: No   Tobacco Use: Medium Risk (6/17/2025)    Patient History     Smoking Tobacco Use: Former     Smokeless Tobacco Use: Never     Passive Exposure: Not on file   Financial Resource Strain: Low Risk  (3/8/2025)    Financial Resource Strain     Within the past 12 months, have you or your family members you live with been unable to get utilities (heat, electricity) when it was really needed?: No   Alcohol Use: Not At Risk (12/16/2019)    AUDIT-C     Frequency of Alcohol Consumption: Never     Average Number of Drinks: Not on file     Frequency of Binge Drinking: Not on file   Transportation Needs: Low Risk  (3/8/2025)    Transportation Needs     Within the past 12 months, has lack of transportation kept you from medical appointments, getting your medicines, non-medical meetings or appointments, work, or from getting things that you need?: No   Physical Activity: Not on file   Interpersonal Safety: Low Risk  (3/8/2025)    Interpersonal Safety     Do you feel physically and emotionally safe where you currently live?: Yes     Within the past 12 months, have you been hit, slapped, kicked or otherwise physically hurt by someone?: No     Within the past 12 months, have you been humiliated or emotionally abused in other ways by your partner or ex-partner?: No   Stress: Not on file   Social Connections: Not on file   Health Literacy: Not on file       Functional Status:  Prior to admission patient needed assistance:   Dependent ADLs:: Independent, Ambulation-no assistive device, Dressing, Bathing, Eating, Grooming, Positioning, Transfers, Toileting  Dependent IADLs:: Cleaning  Assesssment of Functional Status: Not at  functional  "baseline    Mental Health Status:  Mental Health Status: No Current Concerns       Chemical Dependency Status:  Chemical Dependency Status: No Current Concerns             Values/Beliefs:  Spiritual, Cultural Beliefs, Yarsanism Practices, Values that affect care: no               Discussed  Partnership in Safe Discharge Planning  document with patient/family: No    Additional Information:    Per Dr. Bauer's assessment on 7/3/25 \"Loy Limon is a 70 year old male with a past medical history significant for HTN, CAD, N-STEMI in 3/2025, a-fib with RVR on Eliquis, T2DM on long-lasting insulin, prostate cancer, cirrhosis of liver s/p liver transplant in 2018, and former tobacco use, who was admitted on 07/02/2025 with R ICA occlusion and fth acute on chronic infarct of R occipital lobe as well as multifocal subacute punctate infarcts involving multiple vascular territories bilaterally. \"     RNCM met with the patient and wife at bedside. RNCC used language Line  via phone.   Patient is alert and oriented X4. This writer discussed the Care Management Role.   Confirmed demographics and PCP. Patient has no HCD.     Patient lives with his wife in the apartment. Patient was completely independent with all ADLS, meals, meds and IADLs.     Patient has no ambulatory DMEs. He is using depends for incontinence. Pt has a nurse that comes to set up his medications box every week. Patient could not recall the name of the [facility] company.   Patient is retired and collects longterm and social security income.   Family denied  mental health and safety concerns.       Patient reported that  cost of parking at the hospital and clinic is prohibitive. He is using public transportation to get to / from medical appointments. Patient is not aware if there is transportation with his [facility] Medicare & Medicaid program.   Patient also stated that he has to buy his own depends for incontinence management.   RNCC suggested to " contact customer services for his insurance and verify what is the process to have it covered by insurance and likely to get prescription from his PCP and take it to the DME store. Patient stated that he is over-income for PCA/ Home Making services. He was once denied by the [facility] for elderly waver assessment.     Patient declined spiritual care services.     Patient will need help with scheduling medical transportation to home.   Care Management team will continue to follow for this admission.        Addendum 0351 pm: RNCC attempted to call [employer] (ph  [phone]) but the office is closed for the National Holiday. RNCC placed instruction note in discharge AVS for the patient to call during regular business hours and confirm transportation benefits. Requested BIA [place] to update the patient.     Next Steps:  Assist with discharge insurance funded transportation.    Patti Parekh, MSN, MA, RN, [facility]   Holiday/Weekend Covering RN Care Coordinator  [facility] Acute Care Management  Searchable on Vocera: 4C MICU RNCC , 4E SICU RNCC , 4A CVICU RNCC, 6A Neuro RNCC

## 2025-07-04 NOTE — PLAN OF CARE
Arrived from: ED to 6A at 1900  Belongings/meds: With patient   2 RN Skin Assessment Completed by: Kacy ARANDA and Julisa DALLAS  Skin Findings: WNL  Non-intact findings documented (yes/no/NA): None    Status: Hx HTN, CAD, N-STEMI in 3/2025, a-fib with RVR on Eliquis, T2DM, prostate cancer, cirrhosis of liver s/p liver transplant in 2018, tobacco use. Presented to the ED 7/2 after experiencing intermittent headache, blurry vision, and temporary changes in speech occurring in the last 2-3 weeks.   Vitals: HTN within parameters (SBP <180). CCM; atrial fibrillation.   Neuros: Intact ex dizziness, numbness/tingling to bilateral feet; R worse than L. Latvian speaking  IV: PIV SL   Labs/Electrolytes:    Resp: RA   Diet: Regular diet   : Voiding spont  Skin: Intact  Pain: Denies   Activity: Up SBA/walker   Social: Wife at bedside  Plan: Cards consult

## 2025-07-04 NOTE — PROGRESS NOTES
Care Management Follow Up    Length of Stay (days): 2    Expected Discharge Date: 07/05/2025     Concerns to be Addressed: all concerns addressed in this encounter     Patient plan of care discussed at interdisciplinary rounds: No, due to holiday no rounds; pt reviewed with charge RN    Anticipated Discharge Disposition:  Home with family support     Anticipated Discharge Services:  OP Cancer Rehab if qualifies, per PT note    Referrals Placed by CM/SW:  none  Private pay costs discussed: Not applicable    Discussed  Partnership in Safe Discharge Planning  document with patient/family: No     Handoff Completed: No, handoff not indicated or clinically appropriate    Additional Information:  SW briefly spoke with patient per RNKURTIS Armstrong's request; she asked that writer visit pt to explain that he does have a medical transport benefit, but it is likely unavailable this weekend.    SW stopped in to pt's room and utilized a phone  to deliver this information via [employer], [phone]. SW included a sticky note with [employer]'s medical ride service, [phone], as well as its business hours which are M-F 7am-8pm and gave this to patient.    Pt thanked writer for this info but noted that he cannot afford the ride home if he leaves the hospital this weekend, as he will need to Uber or take another kind of cab back home. SW noted I will ask my weekend colleagues to look into getting him a cab voucher.    Next Steps: Look into cab voucher for discharge this weekend, vs ride via [employer] insurance.    Braenn Gould, ABRAHAM, LIZZIESW  7/4/2025    On weekends: Vocera 6A Neuro SW and 6B IMC     Social Work and Care Management Department       SEARCHABLE in [facility] - search SOCIAL WORK       [place] ([phone]) Saturday and Sunday     Units: 4A Vocera, 4C Vocera, & 4E Vocera        Units: 5A [phone] Vocera, 5A [phone] Vocera , BMT SW 1 BMT SW 2, BMT SW 3 & BMT SW 4  5C Off Service [phone] - [phone]   5C Off Service [phone]     Units: 6A Vocera & 6B Vocera      Units: 6C Vocera     Units: 7A Vocera & 7B Vocera      Units: 7C Med Surg 7401 thru 7418 and 7C Med Surg 7502 thru 7521      Unit: [place] ED Vocera & [place] Obs Vocera     [facility] ([phone]) Saturday and Sunday      Units: 5 Ortho Vocera, 5 Med Surg Vocera & WB ED Vocera     Units: 6 Med Surg Vocera, 8 Med Surg Vocera, & 10 ICU Vocera      After hours Vocera [facility] and After Hours Vocera [place]     Saturday & Sunday (1630 - 0000)    Mon-Fri (1800-0790)     [facility] Recognized Holidays  ([phone])    Units: ALL   - see above VOCERA links to units and after hours       Breann Gould

## 2025-07-07 ENCOUNTER — PATIENT OUTREACH (OUTPATIENT)
Dept: CARE COORDINATION | Facility: CLINIC | Age: 70
End: 2025-07-07
Payer: COMMERCIAL

## 2025-07-07 NOTE — PROGRESS NOTES
Clinic Care Coordination Contact  [facility]/Voicemail    Clinical Data: Care Coordinator Outreach    Outreach Documentation Number of Outreach Attempt   7/7/2025  12:26 PM 1       Left message on patient's voicemail with call back information and requested return call.      Plan: Care Coordinator will try to reach patient again in 1-2 business days.    ADIA DICKSON RN on 7/7/2025 at 12:26 PM

## 2025-07-09 ENCOUNTER — TELEPHONE (OUTPATIENT)
Dept: CARDIOLOGY | Facility: CLINIC | Age: 70
End: 2025-07-09
Payer: COMMERCIAL

## 2025-07-09 ENCOUNTER — PATIENT OUTREACH (OUTPATIENT)
Dept: CARE COORDINATION | Facility: CLINIC | Age: 70
End: 2025-07-09
Payer: COMMERCIAL

## 2025-07-09 ENCOUNTER — DOCUMENTATION ONLY (OUTPATIENT)
Dept: FAMILY MEDICINE | Facility: CLINIC | Age: 70
End: 2025-07-09
Payer: COMMERCIAL

## 2025-07-09 NOTE — PROGRESS NOTES
Clinic Care Coordination Contact  Transitions of Care Outreach    Chief Complaint   Patient presents with    Clinic Care Coordination - Post Hospital       Most Recent Admission Date: 7/2/2025   Most Recent Admission Diagnosis: Acute ischemic stroke (H) - I63.9     Most Recent Discharge Date: 7/4/2025   Most Recent Discharge Diagnosis: Acute ischemic stroke (H) - I63.9  Essential hypertension - I10  Memory loss - R41.3  NSTEMI (non-ST elevated myocardial infarction) (H) - I21.4  Sepsis, due to unspecified organism, unspecified whether acute organ dysfunction present (H) - A41.9  Hypothyroidism, unspecified type - E03.9  Hyperglycemia - R73.9  Confusion - R41.0  Hypoxia - R09.02  Uncontrolled type 2 diabetes mellitus with hyperglycemia (H) - E11.65  DEREK (acute kidney injury) - N17.9  Atrial fibrillation with rapid ventricular response (H) - I48.91  Prostate cancer (H) - C61  Coronary artery disease of native artery of native heart with stable angina pectoris - I25.118  Status post coronary angiogram - Z98.890  Diabetes mellitus, type 2 (H) - E11.9  Constipation - K59.00     Transitions of Care Assessment    Discharge Assessment  How are you doing now that you are home?: RN called and spoke with patient with . Pt shared doing good, stays at home mostly, though feels dizzy. Doesn't feel good enough to drive. RN shared with patient that he has appointments he needs to schedule with referrals from hospital. Pt shares he has difficulty calling due to not speaking English. RN offered to help call with patient on 3 way call and patient agrees. RN called Stroke Neurology ([phone]) with patient and  to schedule appointment. They will need to review referral and will get back to him, will call him in 3-5 days. RN called Cardiology ([phone]) with patient to help schedule appointment based on hospital referral. Per Cardiology scheduling- not due to see Electrophysiology and HF RN will be calling  to help him get his Cardiology appointments scheduled.     Pt shared all medications from the hospital are the same with no changes. He has all medications. Pt declined to schedule sooner appt with PCP.  How are your symptoms? (Red Flag symptoms escalate to triage hotline per guidelines): Improved  Do you know how to contact your clinic care team if you have future questions or changes to your health status? : Yes  Does the patient have their discharge instructions? : Yes  Does the patient have questions regarding their discharge instructions? : No  Were you started on any new medications or were there changes to any of your previous medications? : Yes  Does the patient have all of their medications?: Yes  Do you have questions regarding any of your medications? : No  Do you have all of your needed medical supplies or equipment (DME)?  (i.e. oxygen tank, CPAP, cane, etc.): Yes              Follow up Plan     Discharge Follow-Up  Discharge follow up appointment scheduled in alignment with recommended follow up timeframe or Transitions of Risk Category? (Low = within 30 days; Moderate= within 14 days; High= within 7 days): Yes  Discharge Follow Up Appointment Date: 07/29/25 (refused sooner (1 week) appt with anyone other than PCP)  Discharge Follow Up Appointment Scheduled with?: Primary Care Provider    Future Appointments   Date Time Provider Department Center   7/29/2025 10:30 AM Jaswinder Anne MD [facility]   9/9/2025  8:00 AM Jaswinder Anne MD [facility]   10/7/2025  2:00 PM Leia Edward PA-C [facility]   11/20/2025  1:30 PM [facility]   11/20/2025  2:30 PM Varun Johnson MD [facility]   1/13/2026  9:30 AM Izabela Galvan MD [facility]       Outpatient Plan as outlined on AVS reviewed with patient.      For any urgent concerns, please contact our 24 hour nurse triage line: [phone]       ADIA DICKSON RN

## 2025-07-09 NOTE — TELEPHONE ENCOUNTER
[facility] Call Center    Phone Message    May a detailed message be left on voicemail: yes     Reason for Call: Other: Referral for heart failure from Maynor Razo MD. Patient would be new heart failure.      Action Taken: Other: Cardiology     Travel Screening: Not Applicable     Thank you!  [facility]

## 2025-07-18 ENCOUNTER — TELEPHONE (OUTPATIENT)
Dept: CARE COORDINATION | Facility: CLINIC | Age: 70
End: 2025-07-18
Payer: COMMERCIAL

## 2025-07-18 NOTE — TELEPHONE ENCOUNTER
Stroke RN Care Coordination - Follow-Up Outreach Note     SITUATION     Loy Limon is a 70 year old male who is receiving support for:  Clinic Care Coordination - Follow-up (Cardiology Referral)    BACKGROUND     Received message from Dr. Razo requesting that RN contact pt to assist in coordination of hospital follow up with cardiology.    ASSESSMENT     Spoke with pt and discussed need for cardiology appt. Pt agreeable to RN scheduling on his behalf. Prefers Tuesday AMs.     Spoke with cardiology scheduling and was told that new referrals need to be reviewed by their care coordinator team. I was transferred to their care coordinator and LVM requesting a return call to schedule pt.    PLAN     Follow-up plan:  Will await return call from cardiology coordinator.       Allison Wilson BS, RN, SCRN  RN Stroke Neurology Care Coordinator  [facility] Neuroscience Service Line

## 2025-07-18 NOTE — TELEPHONE ENCOUNTER
Patient is a patient of Dr. Plascencia who was following patient for chronic atrial fib.  He was last seen by Dr. Plascencia 6/2025.  Since that time, patient has had CVA and echo showed EF 35% and Oncology has referred patient for appt soon. Will schedule with heart failure provider due to the EF.    7/23/25 - We initially had scheduled patient to see Dr. Correia, but Dr. Espino had opening sooner.  Called patient with assistance from  and offered him appt on Monday, 7/28 with Dr. Espino.

## 2025-07-21 ENCOUNTER — APPOINTMENT (OUTPATIENT)
Dept: INTERPRETER SERVICES | Facility: CLINIC | Age: 70
End: 2025-07-21
Payer: COMMERCIAL

## 2025-07-21 NOTE — TELEPHONE ENCOUNTER
Patient has been scheduled for cardiology follow up on 8/12.       Allsion Wilson BS, RN, SCRN  Stroke/Neurovascular Clinic RN  [facility] Neuroscience Service Line

## 2025-07-23 NOTE — PROGRESS NOTES
Type of Form Received:     Form Received (Date) 7/9/25   Form Filled out No   Placed in provider folder Yes

## 2025-07-24 ENCOUNTER — CARE COORDINATION (OUTPATIENT)
Dept: CARDIOLOGY | Facility: CLINIC | Age: 70
End: 2025-07-24
Payer: COMMERCIAL

## 2025-07-24 DIAGNOSIS — I48.91 ATRIAL FIBRILLATION WITH RAPID VENTRICULAR RESPONSE (H): Primary | ICD-10-CM

## 2025-07-24 DIAGNOSIS — I42.2 HYPERTROPHIC CARDIOMYOPATHY (H): ICD-10-CM

## 2025-07-24 DIAGNOSIS — R93.1 DECREASED CARDIAC EJECTION FRACTION: ICD-10-CM

## 2025-07-24 NOTE — TELEPHONE ENCOUNTER
Called Loy to update with plan. Will need to move his heart failure appointment to 8/1 with Dr. Bergman. Spoke with patient via  and he confirmed appointments and wrote them down. Offered to call daughter Kaylene per notes as well but he reports she is in [place] right now. He verbalized understanding of appointment reschedule.     Advised him he will also be hearing from genetics team as well to schedule some follow up testing and appointment with Dr. Patterson.

## 2025-07-24 NOTE — PROGRESS NOTES
Date: 7/24/2025    Time of Call: 8:56 AM     Diagnosis:  HCM, reduced EF     [ TORB ] Ordering provider: Dr. Rakesh Patterson, Dr. Britton Muller   Order: Establish care with Dr. Patterson with cardiac MRI and ECHO prior. 2 week ZioPatch. Establish care with Dr. Muller for CV Genetics EP consultation.      Order received by: Sujata FERNANDO RN      Follow-up/additional notes:

## 2025-07-25 ENCOUNTER — MEDICAL CORRESPONDENCE (OUTPATIENT)
Dept: HEALTH INFORMATION MANAGEMENT | Facility: CLINIC | Age: 70
End: 2025-07-25
Payer: COMMERCIAL

## 2025-07-28 ENCOUNTER — LAB (OUTPATIENT)
Dept: LAB | Facility: CLINIC | Age: 70
End: 2025-07-28
Payer: COMMERCIAL

## 2025-07-28 DIAGNOSIS — Z94.4 LIVER TRANSPLANTED (H): ICD-10-CM

## 2025-07-28 DIAGNOSIS — I42.2 HYPERTROPHIC CARDIOMYOPATHY (H): ICD-10-CM

## 2025-07-28 DIAGNOSIS — I50.23 ACUTE ON CHRONIC SYSTOLIC HEART FAILURE (H): ICD-10-CM

## 2025-07-28 LAB
ANION GAP SERPL CALCULATED.3IONS-SCNC: 12 MMOL/L (ref 7–15)
BUN SERPL-MCNC: 17.4 MG/DL (ref 8–23)
CALCIUM SERPL-MCNC: 8.8 MG/DL (ref 8.8–10.4)
CHLORIDE SERPL-SCNC: 103 MMOL/L (ref 98–107)
CREAT SERPL-MCNC: 1.18 MG/DL (ref 0.67–1.17)
EGFRCR SERPLBLD CKD-EPI 2021: 66 ML/MIN/1.73M2
GLUCOSE SERPL-MCNC: 191 MG/DL (ref 70–99)
HCO3 SERPL-SCNC: 25 MMOL/L (ref 22–29)
POTASSIUM SERPL-SCNC: 4.6 MMOL/L (ref 3.4–5.3)
SODIUM SERPL-SCNC: 140 MMOL/L (ref 135–145)

## 2025-07-28 PROCEDURE — 80048 BASIC METABOLIC PNL TOTAL CA: CPT | Performed by: PATHOLOGY

## 2025-07-28 PROCEDURE — 36415 COLL VENOUS BLD VENIPUNCTURE: CPT | Performed by: PATHOLOGY

## 2025-07-29 ENCOUNTER — TELEPHONE (OUTPATIENT)
Dept: INTERNAL MEDICINE | Facility: CLINIC | Age: 70
End: 2025-07-29

## 2025-07-29 ENCOUNTER — OFFICE VISIT (OUTPATIENT)
Dept: FAMILY MEDICINE | Facility: CLINIC | Age: 70
End: 2025-07-29
Attending: STUDENT IN AN ORGANIZED HEALTH CARE EDUCATION/TRAINING PROGRAM
Payer: COMMERCIAL

## 2025-07-29 VITALS
BODY MASS INDEX: 32.25 KG/M2 | RESPIRATION RATE: 18 BRPM | WEIGHT: 188.9 LBS | HEART RATE: 70 BPM | HEIGHT: 64 IN | OXYGEN SATURATION: 98 % | TEMPERATURE: 98.2 F | SYSTOLIC BLOOD PRESSURE: 164 MMHG | DIASTOLIC BLOOD PRESSURE: 93 MMHG

## 2025-07-29 DIAGNOSIS — R26.89 BALANCE PROBLEM: ICD-10-CM

## 2025-07-29 DIAGNOSIS — Z86.73 HISTORY OF RECENT STROKE: ICD-10-CM

## 2025-07-29 DIAGNOSIS — I10 ESSENTIAL HYPERTENSION: ICD-10-CM

## 2025-07-29 DIAGNOSIS — R26.89 IMPAIRED GAIT AND MOBILITY: Primary | ICD-10-CM

## 2025-07-29 DIAGNOSIS — Z86.73 HISTORY OF STROKE: Primary | ICD-10-CM

## 2025-07-29 DIAGNOSIS — Z09 HOSPITAL DISCHARGE FOLLOW-UP: ICD-10-CM

## 2025-07-29 PROCEDURE — T1013 SIGN LANG/ORAL INTERPRETER: HCPCS | Mod: U4

## 2025-07-29 PROCEDURE — 1111F DSCHRG MED/CURRENT MED MERGE: CPT | Performed by: FAMILY MEDICINE

## 2025-07-29 PROCEDURE — G2211 COMPLEX E/M VISIT ADD ON: HCPCS | Performed by: FAMILY MEDICINE

## 2025-07-29 PROCEDURE — 3079F DIAST BP 80-89 MM HG: CPT | Performed by: FAMILY MEDICINE

## 2025-07-29 PROCEDURE — 99215 OFFICE O/P EST HI 40 MIN: CPT | Performed by: FAMILY MEDICINE

## 2025-07-29 PROCEDURE — 3077F SYST BP >= 140 MM HG: CPT | Performed by: FAMILY MEDICINE

## 2025-07-29 NOTE — TELEPHONE ENCOUNTER
Home care referral was faxed to [facility].    [facility]  -------------------------------------------------------------------------------------        ----- Message from Jaswinder Anne sent at 7/29/2025 11:00 AM CDT -----  He was just in the hospital  He had a stroke  Balance is off, home bound  He needs HHN OT PT can we send?  Someone comes out for a long time weekly to set up meds but he cannot tell me from what agency, why don't you see if our HHN agency can do this  Thanks

## 2025-07-29 NOTE — PROGRESS NOTES
"  Assessment & Plan     Impaired gait and mobility  I encouraged him to discuss lightheadedness w/ cardiology at visit in three days; he cannot tell us what agency sends someone to help set up meds weekly but per pt that is all they do, so I will add HHN/PT and OT   - Orthostatic blood pressure and pulse  - Cane Order for DME - ONLY FOR DME    Hospital discharge follow-up  Reviewed w/ him in detail    History of recent stroke  Encouraged to keep appt w/ Neuro    Essential hypertension  Elevated today; I am leaving meds alone but sees cardiology in three days    MED REC REQUIRED{  Post Medication Reconciliation Status:   The longitudinal plan of care for the diagnosis(es)/condition(s) as documented were addressed during this visit. Due to the added complexity in care, I will continue to support Loy in the subsequent management and with ongoing continuity of care.  50 minutes spent by me on the date of the encounter doing chart review, history and exam, documentation and further activities per the note    Subjective   Loy is a 70 year old, presenting for the following health issues:  Hospital F/U (Medication for making stroke \"go away\")      7/29/2025    10:02 AM   Additional Questions   Roomed by Tanisha JARA   Accompanied by N/A     HPI  Inpt notes rvwd  Since stroke, often dizzy, off balance, no falls but very careful and does not go far  Has a nurse who sets up meds once a week, no other in home help  Was working part time but not now due to dizzy/week  Sees cardio in three days, Neuro in three weeks  Dizzy; seems more light headed than spinning, we talked at length via  to characterize  Notes if gets out of bed or chair, briefly, sometimes if walking. Not every time.   Generally off balance since home  Does not use can  Does not have OT or PT  Open to those hopefully in home he does not feel ok going anywhere w/o wife   Goal: October keep planned trip to [place]   Today he did orthostatics " lie/sit which were stable, standing he was not dizzy but said legs felt shaky so he sat, we did not complete standing  Normal romberg  Not using cane, he will, rx given  Labs utd  Past Medical History:   Diagnosis Date    Anemia     Biliary stricture of transplanted liver (H) 2018    BPH (benign prostatic hyperplasia)     Cancer (H)     hepatocellualr carcinoma    Cirrhosis of liver (H) 2018    Diabetes (H)     Enlarged prostate     Hernia, abdominal     Hypothyroidism     Impotence of organic origin 2020    Inguinal hernia     Repaired with mesh on 18    Liver lesion 2018    Liver transplanted (H) 2018     donor liver transplant    Portal vein thrombosis     on path explant    Postoperative atrial fibrillation (H) 2018    Prostate cancer (H)     TB lung, latent     Treated      Past Surgical History:   Procedure Laterality Date    APPENDECTOMY      BRONCHOSCOPY (RIGID OR FLEXIBLE), DIAGNOSTIC N/A 2024    Procedure: BRONCHOSCOPY, WITH BRONCHOALVEOLAR LAVAGE;  Surgeon: Jimmy Farley MD;  Location:  GI    COLONOSCOPY          CV CORONARY ANGIOGRAM N/A 10/13/2021    Procedure: CV CORONARY ANGIOGRAM;  Surgeon: Yaya Hampton MD;  Location:  [facility]ATH [facility]    CV CORONARY ANGIOGRAM N/A 3/9/2025    Procedure: Coronary Angiogram;  Surgeon: Randy Doss MD;  Location:  [facility]ATH [facility]    CV CORONARY LITHOTRIPSY PCI N/A 10/13/2021    Procedure: CV Coronary Lithotripsy PCI;  Surgeon: Yaya Hampton MD;  Location:  [facility]ATH [facility]    CV INSTANTANEOUS WAVE-FREE RATIO N/A 10/13/2021    Procedure: Instantaneous Wave-Free Ratio;  Surgeon: Yaya Hampton MD;  Location:  [facility]ATH [facility]    CV INTRAVASULAR ULTRASOUND N/A 10/13/2021    Procedure: Intravascular Ultrasound;  Surgeon: Yaya Hampton MD;  Location:  [facility]ATH [facility]    CV PCI N/A 3/9/2025     "Procedure: Percutaneous Coronary Intervention;  Surgeon: Randy Doss MD;  Location:  [facility]ATH [facility]    CV PCI STENT DRUG ELUTING N/A 10/13/2021    Procedure: Percutaneous Coronary Intervention Stent Drug Eluting;  Surgeon: Yaya Hampton MD;  Location:  [facility] CATH [facility]    DAVINCI PROSTATECTOMY N/A 2020    Procedure: Robot-assisted laparoscopic radical prostatectomy, Bladder biopsy;  Surgeon: Kenrick Casiano MD;  Location: [facility]    ENDOSCOPIC RETROGRADE CHOLANGIOPANCREATOGRAM N/A 2018    Procedure: ENDOSCOPIC RETROGRADE CHOLANGIOPANCREATOGRAM, with Billary Sphincterotomy and Billary Stent Placement;  Surgeon: Omero Lawler MD;  Location: [facility]    ENDOSCOPIC RETROGRADE CHOLANGIOPANCREATOGRAM  2019    Procedure: COMBINED ENDOSCOPIC RETROGRADE CHOLANGIOPANCREATOGRAPHY, Bile Duct Stent Exchange;  Surgeon: Omero Lawler MD;  Location: [facility]    ENDOSCOPIC RETROGRADE CHOLANGIOPANCREATOGRAM N/A 2019    Procedure: ENDOSCOPIC RETROGRADE CHOLANGIOPANCREATOGRAPHY, WITH BILIARY STENT REMOVAL AND STONE EXTRACTION;  Surgeon: Omero Lawler MD;  Location: [facility]    HERNIORRHAPHY INGUINAL  2018    Procedure: Herniorrhaphy inguinal;  Surgeon: Emil Echevarria MD;  Location: [facility]    IR LIVER BIOPSY PERCUTANEOUS  2018    LAPAROTOMY EXPLORATORY      per the patient \"for infection in the LLQ\"     PICC INSERTION Right 2024    47 cm basilic    TRANSPLANT LIVER RECIPIENT  DONOR N/A 2018    Procedure: TRANSPLANT LIVER RECIPIENT  DONOR;  Surgeon: Emil Echevarria MD;  Location: [facility]     Current Outpatient Medications   Medication Sig Dispense Refill    apixaban ANTICOAGULANT (ELIQUIS) 5 MG tablet Take 1 tablet (5 mg) by mouth 2 times daily. 180 tablet 3    aspirin (ASA) 81 MG chewable tablet Take 1 tablet (81 mg) by mouth daily. 30 tablet 0    atorvastatin (LIPITOR) 40 MG tablet Take 1 " tablet (40 mg) by mouth daily. 90 tablet 2    diltiazem ER COATED BEADS (CARDIZEM CD/CARTIA XT) 120 MG 24 hr capsule Take 1 capsule (120 mg) by mouth daily. 90 capsule 3    hydrALAZINE (APRESOLINE) 25 MG tablet Take 1 tablet (25 mg) by mouth 2 times daily. 180 tablet 3    insulin glargine (LANTUS SOLOSTAR) 100 UNIT/ML pen Inject 20 Units subcutaneously at bedtime. 15 mL 1    levothyroxine (SYNTHROID/LEVOTHROID) 150 MCG tablet Take 1 tablet (150 mcg) by mouth daily. 90 tablet 3    losartan (COZAAR) 25 MG tablet Take 1 tablet (25 mg) by mouth daily. 30 tablet 0    metFORMIN (GLUCOPHAGE XR) 500 MG 24 hr tablet Take 2 tablets (1,000 mg) by mouth 2 times daily (with meals). 360 tablet 3    metoprolol succinate ER (TOPROL XL) 25 MG 24 hr tablet Take 1 tablet (25 mg) by mouth daily. 90 tablet 3    mycophenolate (GENERIC EQUIVALENT) 500 MG tablet Take 1 tablet (500 mg) by mouth 2 times daily 180 tablet 3    predniSONE (DELTASONE) 5 MG tablet Take 1 tablet (5 mg) by mouth daily. 90 tablet 1    Vitamin D3 (VITAMIN D, CHOLECALCIFEROL,) 25 mcg (1000 units) tablet Take 2,000 Units by mouth daily.       No current facility-administered medications for this visit.     No Known Allergies  Family History   Problem Relation Age of Onset    Memory loss Mother     Liver Disease Brother     Cancer Maternal Grandmother     Skin Cancer No family hx of     Melanoma No family hx of      Social History     Socioeconomic History    Marital status:      Spouse name: Not on file    Number of children: Not on file    Years of education: Not on file    Highest education level: Not on file   Occupational History    Not on file   Tobacco Use    Smoking status: Former     Current packs/day: 0.00     Average packs/day: (1.4 ttl pk-yrs)     Types: Cigarettes, Cigars     Start date: 1970     Quit date: 3/14/2018     Years since quittin.3    Smokeless tobacco: Never    Tobacco comments:     Quit smoking 2018, one cigarette after  dinner   Vaping Use    Vaping status: Never Used   Substance and Sexual Activity    Alcohol use: No     Comment: Quit drinking Feb 2018    Drug use: No    Sexual activity: Not on file   Other Topics Concern    Parent/sibling w/ CABG, MI or angioplasty before 65F 55M? Not Asked   Social History Narrative    Born in [place], came to [place] years ago.     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cardboard, trimming meats     Social Drivers of Health     Financial Resource Strain: Low Risk  (3/8/2025)    Financial Resource Strain     Within the past 12 months, have you or your family members you live with been unable to get utilities (heat, electricity) when it was really needed?: No   Food Insecurity: Low Risk  (3/8/2025)    Food Insecurity     Within the past 12 months, did you worry that your food would run out before you got money to buy more?: No     Within the past 12 months, did the food you bought just not last and you didn t have money to get more?: No   Transportation Needs: Low Risk  (3/8/2025)    Transportation Needs     Within the past 12 months, has lack of transportation kept you from medical appointments, getting your medicines, non-medical meetings or appointments, work, or from getting things that you need?: No   Physical Activity: Not on file   Stress: Not on file   Social Connections: Not on file   Interpersonal Safety: Low Risk  (3/8/2025)    Interpersonal Safety     Do you feel physically and emotionally safe where you currently live?: Yes     Within the past 12 months, have you been hit, slapped, kicked or otherwise physically hurt by someone?: No     Within the past 12 months, have you been humiliated or emotionally abused in other ways by your partner or ex-partner?: No   Housing Stability: High Risk (3/8/2025)    Housing Stability     Do you have housing? : No     Are you worried about losing your housing?: No   Pt is open to HHN/OT/PT all ofwhich I feel would be very helpful for increasing strength and  "mobility      Objective    BP (!) 164/93 (BP Location: Right arm, Patient Position: Sitting, Cuff Size: Adult Regular)   Pulse 70   Temp 98.2  F (36.8  C)   Resp 18   Ht 1.626 m (5' 4.02\")   Wt 85.7 kg (188 lb 14.4 oz)   SpO2 98%   BMI 32.41 kg/m    Body mass index is 32.41 kg/m .  Physical Exam   GENERAL: alert and no distress  NECK: no adenopathy, no asymmetry, masses, or scars  RESP: lungs clear to auscultation - no rales, rhonchi or wheezes  CV: regular rate and rhythm, normal S1 S2, no S3 or S4, no murmur, click or rub, no peripheral edema  ABDOMEN: soft, nontender, no hepatosplenomegaly, no masses and bowel sounds normal  MS: no gross musculoskeletal defects noted, no edema  Neurology: normal mentation and cognition   He can stand and walk a few steps normally, no gross motor deficit              Signed Electronically by: Jaswinder Anne MD    "

## 2025-07-30 ENCOUNTER — APPOINTMENT (OUTPATIENT)
Dept: INTERPRETER SERVICES | Facility: CLINIC | Age: 70
End: 2025-07-30
Payer: COMMERCIAL

## 2025-07-30 ENCOUNTER — TELEPHONE (OUTPATIENT)
Dept: FAMILY MEDICINE | Facility: CLINIC | Age: 70
End: 2025-07-30
Payer: COMMERCIAL

## 2025-07-30 ENCOUNTER — DOCUMENTATION ONLY (OUTPATIENT)
Dept: FAMILY MEDICINE | Facility: CLINIC | Age: 70
End: 2025-07-30
Payer: COMMERCIAL

## 2025-07-30 ENCOUNTER — MEDICAL CORRESPONDENCE (OUTPATIENT)
Dept: HEALTH INFORMATION MANAGEMENT | Facility: CLINIC | Age: 70
End: 2025-07-30
Payer: COMMERCIAL

## 2025-07-30 NOTE — TELEPHONE ENCOUNTER
Left detailed VM on confidential voicemail box for Yesenia ZAMARRIPA with [facility] with the following verbal order(s): Home Care Orders: Skilled Nursing: Delay in start of care, per patient request, patient requesting 8/5/25.   Angella LIMON LPN  [facility]

## 2025-07-30 NOTE — TELEPHONE ENCOUNTER
RECORDS RECEIVED FROM:    DATE RECEIVED:    GENERAL RECORDS STATUS DETAILS   OFFICE NOTE from cardiologists Internal 6-17-25 Rolly Plascencia MD    LABS Internal    EKG (STRIPS & REPORTS) Internal 7-3-25   MONITORS (STRIPS & REPORTS) Internal ZIO 1-21-25   ECHOS (IMAGES AND REPORTS) Internal 7-3-25   CATH (ALL IMAGES AND REPORTS FROM BIRTH - PRESENT) Internal 3-9-25   MRI/MRA (ALL IMAGES AND REPORTS FROM BIRTH - PRESENT) Internal MR Cardiac 3-13-25   CT/CTA (ALL IMAGES AND REPORTS FROM BIRTH - PRESENT) Internal CAT Angio 7-3-25

## 2025-07-30 NOTE — PROGRESS NOTES
Type of Form Received: [facility]ication Changes    Form Received (Date) 7/29/2025   Form Filled out Yes, faxed 7/31   Placed in provider folder Yes

## 2025-07-30 NOTE — TELEPHONE ENCOUNTER
[facility] Call Center    Phone Message    May a detailed message be left on voicemail: yes     Reason for Call: Order(s): Home Care Orders: Skilled Nursing: Delay in start of care, per patient request, patient requesting 8/5/25. Thank you    Action Taken: Message routed to:  [facility] P[facility]    Travel Screening: Not Applicable     Date of Service:

## 2025-07-31 ENCOUNTER — DOCUMENTATION ONLY (OUTPATIENT)
Dept: FAMILY MEDICINE | Facility: CLINIC | Age: 70
End: 2025-07-31
Payer: COMMERCIAL

## 2025-07-31 RX ORDER — PREDNISONE 5 MG/1
5 TABLET ORAL DAILY
Qty: 90 TABLET | Refills: 1 | Status: SHIPPED | OUTPATIENT
Start: 2025-07-31

## 2025-07-31 NOTE — PROGRESS NOTES
Type of Form Received: [facility] [id number]    Form Received (Date) 7/30/2025   Form Filled out No   Placed in provider folder Yes

## 2025-07-31 NOTE — TELEPHONE ENCOUNTER
Last Written Prescription:   predniSONE (DELTASONE) 5 MG [id number]    Sig - Route: Take 1 tablet (5 mg) by mouth daily. - Oral  ----------------------  Last Visit Date: 7/29/25   [facility]  Future Visit Date: 9/9/25 [facility]  ----------------------      Refill decision:     predniSONE (DELTASONE) 5 MG tablet  [x] Medication unable to be refilled by RN due to: Medication not included in refill protocol policy         Request from pharmacy:  Requested Prescriptions   Pending Prescriptions Disp Refills    predniSONE (DELTASONE) 5 MG tablet 90 tablet 1     Sig: Take 1 tablet (5 mg) by mouth daily.       There is no refill protocol information for this order

## 2025-08-01 ENCOUNTER — MEDICAL CORRESPONDENCE (OUTPATIENT)
Dept: HEALTH INFORMATION MANAGEMENT | Facility: CLINIC | Age: 70
End: 2025-08-01

## 2025-08-01 ENCOUNTER — PRE VISIT (OUTPATIENT)
Dept: CARDIOLOGY | Facility: CLINIC | Age: 70
End: 2025-08-01

## 2025-08-05 ENCOUNTER — DOCUMENTATION ONLY (OUTPATIENT)
Dept: FAMILY MEDICINE | Facility: CLINIC | Age: 70
End: 2025-08-05
Payer: COMMERCIAL

## 2025-08-05 ENCOUNTER — MEDICAL CORRESPONDENCE (OUTPATIENT)
Dept: HEALTH INFORMATION MANAGEMENT | Facility: CLINIC | Age: 70
End: 2025-08-05
Payer: COMMERCIAL

## 2025-08-05 ENCOUNTER — TELEPHONE (OUTPATIENT)
Dept: FAMILY MEDICINE | Facility: CLINIC | Age: 70
End: 2025-08-05
Payer: COMMERCIAL

## 2025-08-05 ENCOUNTER — TELEPHONE (OUTPATIENT)
Dept: CARDIOLOGY | Facility: CLINIC | Age: 70
End: 2025-08-05
Payer: COMMERCIAL

## 2025-08-06 ENCOUNTER — DOCUMENTATION ONLY (OUTPATIENT)
Dept: FAMILY MEDICINE | Facility: CLINIC | Age: 70
End: 2025-08-06
Payer: COMMERCIAL

## 2025-08-07 ENCOUNTER — ORDERS ONLY (AUTO-RELEASED) (OUTPATIENT)
Dept: CARDIOLOGY | Facility: CLINIC | Age: 70
End: 2025-08-07
Payer: COMMERCIAL

## 2025-08-07 DIAGNOSIS — I48.91 ATRIAL FIBRILLATION WITH RAPID VENTRICULAR RESPONSE (H): ICD-10-CM

## 2025-08-07 DIAGNOSIS — I42.2 HYPERTROPHIC CARDIOMYOPATHY (H): ICD-10-CM

## 2025-08-11 ENCOUNTER — DOCUMENTATION ONLY (OUTPATIENT)
Dept: FAMILY MEDICINE | Facility: CLINIC | Age: 70
End: 2025-08-11
Payer: COMMERCIAL

## 2025-08-11 DIAGNOSIS — I63.9 ACUTE ISCHEMIC STROKE (H): ICD-10-CM

## 2025-08-11 RX ORDER — LOSARTAN POTASSIUM 25 MG/1
25 TABLET ORAL DAILY
Qty: 30 TABLET | Refills: 0 | OUTPATIENT
Start: 2025-08-11

## 2025-08-11 RX ORDER — ASPIRIN 81 MG/1
81 TABLET, CHEWABLE ORAL DAILY
Qty: 30 TABLET | Refills: 5 | Status: SHIPPED | OUTPATIENT
Start: 2025-08-11

## 2025-08-18 ENCOUNTER — LAB (OUTPATIENT)
Dept: LAB | Facility: CLINIC | Age: 70
End: 2025-08-18
Payer: COMMERCIAL

## 2025-08-18 DIAGNOSIS — Z94.4 LIVER REPLACED BY TRANSPLANT (H): ICD-10-CM

## 2025-08-18 DIAGNOSIS — I50.23 ACUTE ON CHRONIC SYSTOLIC HEART FAILURE (H): ICD-10-CM

## 2025-08-18 LAB
ALBUMIN SERPL BCG-MCNC: 3.7 G/DL (ref 3.5–5.2)
ALP SERPL-CCNC: 142 U/L (ref 40–150)
ALT SERPL W P-5'-P-CCNC: 39 U/L (ref 0–70)
ANION GAP SERPL CALCULATED.3IONS-SCNC: 10 MMOL/L (ref 7–15)
AST SERPL W P-5'-P-CCNC: 28 U/L (ref 0–45)
BILIRUB SERPL-MCNC: 0.8 MG/DL
BILIRUBIN DIRECT (ROCHE PRO & PURE): 0.35 MG/DL (ref 0–0.45)
BUN SERPL-MCNC: 19.3 MG/DL (ref 8–23)
CALCIUM SERPL-MCNC: 9.3 MG/DL (ref 8.8–10.4)
CHLORIDE SERPL-SCNC: 104 MMOL/L (ref 98–107)
CREAT SERPL-MCNC: 1.3 MG/DL (ref 0.67–1.17)
EGFRCR SERPLBLD CKD-EPI 2021: 58 ML/MIN/1.73M2
ERYTHROCYTE [DISTWIDTH] IN BLOOD BY AUTOMATED COUNT: 13.1 % (ref 10–15)
GLUCOSE SERPL-MCNC: 218 MG/DL (ref 70–99)
HCO3 SERPL-SCNC: 26 MMOL/L (ref 22–29)
HCT VFR BLD AUTO: 53 % (ref 40–53)
HGB BLD-MCNC: 17.2 G/DL (ref 13.3–17.7)
MCH RBC QN AUTO: 28.5 PG (ref 26.5–33)
MCHC RBC AUTO-ENTMCNC: 32.5 G/DL (ref 31.5–36.5)
MCV RBC AUTO: 87.7 FL (ref 78–100)
PLATELET # BLD AUTO: 221 10E3/UL (ref 150–450)
POTASSIUM SERPL-SCNC: 5.4 MMOL/L (ref 3.4–5.3)
PROT SERPL-MCNC: 6.7 G/DL (ref 6.4–8.3)
RBC # BLD AUTO: 6.04 10E6/UL (ref 4.4–5.9)
SODIUM SERPL-SCNC: 140 MMOL/L (ref 135–145)
WBC # BLD AUTO: 7.6 10E3/UL (ref 4–11)

## 2025-08-18 PROCEDURE — 80053 COMPREHEN METABOLIC PANEL: CPT | Performed by: PATHOLOGY

## 2025-08-18 PROCEDURE — 36415 COLL VENOUS BLD VENIPUNCTURE: CPT | Performed by: PATHOLOGY

## 2025-08-18 PROCEDURE — 85027 COMPLETE CBC AUTOMATED: CPT | Performed by: PATHOLOGY

## 2025-08-18 PROCEDURE — 82248 BILIRUBIN DIRECT: CPT | Performed by: PATHOLOGY

## 2025-08-19 ENCOUNTER — TELEPHONE (OUTPATIENT)
Dept: CARDIOLOGY | Facility: CLINIC | Age: 70
End: 2025-08-19
Payer: COMMERCIAL

## 2025-08-19 DIAGNOSIS — I50.23 ACUTE ON CHRONIC SYSTOLIC HEART FAILURE (H): Primary | ICD-10-CM

## 2025-08-19 DIAGNOSIS — Z53.9 DIAGNOSIS NOT YET DEFINED: Primary | ICD-10-CM

## 2025-08-20 DIAGNOSIS — I50.23 ACUTE ON CHRONIC SYSTOLIC HEART FAILURE (H): Primary | ICD-10-CM

## 2025-08-25 ENCOUNTER — OFFICE VISIT (OUTPATIENT)
Dept: NEUROLOGY | Facility: CLINIC | Age: 70
End: 2025-08-25
Payer: COMMERCIAL

## 2025-08-25 VITALS — OXYGEN SATURATION: 92 % | HEART RATE: 68 BPM | SYSTOLIC BLOOD PRESSURE: 124 MMHG | DIASTOLIC BLOOD PRESSURE: 82 MMHG

## 2025-08-25 DIAGNOSIS — G47.30 SLEEP APNEA, UNSPECIFIED TYPE: ICD-10-CM

## 2025-08-25 DIAGNOSIS — F32.A DEPRESSION, UNSPECIFIED DEPRESSION TYPE: ICD-10-CM

## 2025-08-25 DIAGNOSIS — I63.9 CEREBROVASCULAR ACCIDENT (CVA), UNSPECIFIED MECHANISM (H): Primary | ICD-10-CM

## 2025-08-26 ENCOUNTER — PATIENT OUTREACH (OUTPATIENT)
Dept: CARE COORDINATION | Facility: CLINIC | Age: 70
End: 2025-08-26
Payer: COMMERCIAL

## 2025-08-26 PROBLEM — F32.A DEPRESSION, UNSPECIFIED DEPRESSION TYPE: Status: ACTIVE | Noted: 2025-08-26

## 2025-08-26 PROBLEM — G47.30 SLEEP APNEA, UNSPECIFIED TYPE: Status: ACTIVE | Noted: 2025-08-26

## 2025-08-28 ENCOUNTER — PATIENT OUTREACH (OUTPATIENT)
Dept: CARE COORDINATION | Facility: CLINIC | Age: 70
End: 2025-08-28

## 2025-08-28 ENCOUNTER — TELEPHONE (OUTPATIENT)
Dept: CARDIOLOGY | Facility: CLINIC | Age: 70
End: 2025-08-28

## 2025-08-28 ENCOUNTER — OFFICE VISIT (OUTPATIENT)
Dept: CARDIOLOGY | Facility: CLINIC | Age: 70
End: 2025-08-28
Attending: INTERNAL MEDICINE
Payer: COMMERCIAL

## 2025-08-28 ENCOUNTER — LAB (OUTPATIENT)
Dept: LAB | Facility: CLINIC | Age: 70
End: 2025-08-28
Attending: INTERNAL MEDICINE
Payer: COMMERCIAL

## 2025-08-28 VITALS
HEART RATE: 70 BPM | DIASTOLIC BLOOD PRESSURE: 78 MMHG | WEIGHT: 188.8 LBS | OXYGEN SATURATION: 100 % | BODY MASS INDEX: 32.39 KG/M2 | SYSTOLIC BLOOD PRESSURE: 113 MMHG

## 2025-08-28 DIAGNOSIS — R00.0 TACHYCARDIA: ICD-10-CM

## 2025-08-28 DIAGNOSIS — I50.23 ACUTE ON CHRONIC SYSTOLIC HEART FAILURE (H): ICD-10-CM

## 2025-08-28 DIAGNOSIS — I48.91 ATRIAL FIBRILLATION WITH RAPID VENTRICULAR RESPONSE (H): ICD-10-CM

## 2025-08-28 LAB
ANION GAP SERPL CALCULATED.3IONS-SCNC: 11 MMOL/L (ref 7–15)
BUN SERPL-MCNC: 20.4 MG/DL (ref 8–23)
CALCIUM SERPL-MCNC: 9.2 MG/DL (ref 8.8–10.4)
CHLORIDE SERPL-SCNC: 101 MMOL/L (ref 98–107)
CREAT SERPL-MCNC: 1.25 MG/DL (ref 0.67–1.17)
EGFRCR SERPLBLD CKD-EPI 2021: 61 ML/MIN/1.73M2
GLUCOSE SERPL-MCNC: 212 MG/DL (ref 70–99)
HCO3 SERPL-SCNC: 25 MMOL/L (ref 22–29)
NT-PROBNP SERPL-MCNC: 7372 PG/ML (ref 0–229)
POTASSIUM SERPL-SCNC: 5.1 MMOL/L (ref 3.4–5.3)
SODIUM SERPL-SCNC: 137 MMOL/L (ref 135–145)

## 2025-08-28 PROCEDURE — G0463 HOSPITAL OUTPT CLINIC VISIT: HCPCS | Performed by: PHYSICIAN ASSISTANT

## 2025-08-28 RX ORDER — METOPROLOL SUCCINATE 50 MG/1
50 TABLET, EXTENDED RELEASE ORAL DAILY
Qty: 90 TABLET | Refills: 1 | Status: SHIPPED | OUTPATIENT
Start: 2025-08-28

## 2025-08-28 ASSESSMENT — PAIN SCALES - GENERAL: PAINLEVEL_OUTOF10: NO PAIN (0)

## 2025-09-03 ENCOUNTER — MEDICAL CORRESPONDENCE (OUTPATIENT)
Dept: HEALTH INFORMATION MANAGEMENT | Facility: CLINIC | Age: 70
End: 2025-09-03
Payer: COMMERCIAL

## 2025-09-03 ENCOUNTER — DOCUMENTATION ONLY (OUTPATIENT)
Dept: FAMILY MEDICINE | Facility: CLINIC | Age: 70
End: 2025-09-03
Payer: COMMERCIAL

## 2025-09-03 LAB — CV ZIO PRELIM RESULTS: NORMAL

## (undated) DEVICE — SU PROLENE 2-0 SHDA 36" 8523H

## (undated) DEVICE — SU PDS II 3-0 SH 27" Z316H

## (undated) DEVICE — SUCTION IRR STRYKERFLOW II W/TIP 250-070-520

## (undated) DEVICE — LINEN TOWEL PACK X5 5464

## (undated) DEVICE — PACK ENDOSCOPY GI CUSTOM UMMC

## (undated) DEVICE — LINEN DRAPE 54X72" 5467

## (undated) DEVICE — KIT ENDO FIRST STEP DISINFECTANT 200ML W/POUCH EP-4

## (undated) DEVICE — SOL NACL 0.9% IRRIG 1000ML BOTTLE 2F7124

## (undated) DEVICE — SOL WATER IRRIG 1000ML BOTTLE 2F7114

## (undated) DEVICE — LINEN TOWEL PACK X6 WHITE 5487

## (undated) DEVICE — POSITIONER ARMBOARD FOAM 1PAIR LF FP-ARMB1

## (undated) DEVICE — DRAIN JACKSON PRATT RESERVOIR 100ML SU130-1305

## (undated) DEVICE — CATH BALLOON NC EMERGE 4.50X20MM H7493926720450

## (undated) DEVICE — JELLY LUBRICATING SURGILUBE 2OZ TUBE 0281-0205-02

## (undated) DEVICE — INTR ENDOSCOPIC STENT FUSION OASIS 09.0FRX200CM

## (undated) DEVICE — ENDO TUBING CO2 SMARTCAP STERILE DISP 100145CO2EXT

## (undated) DEVICE — SUCTION MANIFOLD DORNOCH ULTRA CART UL-CL500

## (undated) DEVICE — TUBE FEEDING 08FR 20" 461701

## (undated) DEVICE — ESU GROUND PAD ADULT W/CORD E7507

## (undated) DEVICE — GLIDEWIRE TERUMO .035X180CM 1.5,, J-TIP GR3525

## (undated) DEVICE — MANIFOLD KIT ANGIO AUTOMATED 014613

## (undated) DEVICE — ESU HANDPIECE ABC BEND-A-BEAM 6" 134006

## (undated) DEVICE — SURGICEL FIBRILLAR HEMOSTAT 4"X4" 1963

## (undated) DEVICE — DRAPE IOBAN C-SECTION W/POUCH 30X35" 6657

## (undated) DEVICE — CATH IVUS OPTICROSS HD 6 3.6FR 1.18MM DIA 135CML H7493935408

## (undated) DEVICE — PACK HEART LEFT CUSTOM

## (undated) DEVICE — DRAPE SLEEVE 599

## (undated) DEVICE — CLIP HORIZON LG ORANGE 004200

## (undated) DEVICE — SYR 01ML 27GA 0.5" NDL TBC 309623

## (undated) DEVICE — SU SILK 0 TIE 6X30" A306H

## (undated) DEVICE — BIOPSY VALVE BIOSHIELD 00711135

## (undated) DEVICE — KIT DVC ANGIO IBASIXCOMPAK 13INX20ML 3WAY IN4430

## (undated) DEVICE — SLEEVE TR BAND RADIAL COMPRESSION DEVICE 24CM TRB24-REG

## (undated) DEVICE — LINEN ORTHO PACK 5446

## (undated) DEVICE — ENDO DEVICE LOCKING AND BIOPSY CAP M00545261

## (undated) DEVICE — CATH BALLOON NC EMERGE 4.00X20MM H7493926720400

## (undated) DEVICE — DRSG PRIMAPORE 03 1/8X6" 66000318

## (undated) DEVICE — CLIP ENDO HEMO-LOC GREEN MED/LG 544230

## (undated) DEVICE — DRAIN JACKSON PRATT ROUND SIL 19FR W/TROCAR LF JP-2232

## (undated) DEVICE — ESU ELEC BLADE 6" COATED E1450-6

## (undated) DEVICE — INTRO GLIDESHEATH SLENDER 6FR 10X45CM 60-1060

## (undated) DEVICE — CATH RETRIEVAL BALLOON EXTRACTOR PRO RX-S INJ ABOVE 9-12MM

## (undated) DEVICE — SUCTION TIP YANKAUER VIAGUARD W/SLEEVE SMP-2006-001SS

## (undated) DEVICE — LINEN TOWEL PACK X30 5481

## (undated) DEVICE — SOL NACL 0.9% INJ 1000ML BAG 2B1324X

## (undated) DEVICE — SHTH INTRO 0.021IN ID 6FR DIA

## (undated) DEVICE — GUIDEWIRE VASC 0.014INX180CM RUNTHROUGH 25-1011

## (undated) DEVICE — SU PROLENE 6-0 RB-2DA 30" 8711H

## (undated) DEVICE — DAVINCI S DRAPE ARM INSTRUMENT 420015

## (undated) DEVICE — ENDO EXTRACTOR BALLOON 09-12MM 4690

## (undated) DEVICE — DAVINCI S FCP BIPOLAR FENESTRATED 420205

## (undated) DEVICE — DRAPE WARMER 66X44" ORS-300

## (undated) DEVICE — GUIDEWIRE VASCULAR COMET II 185CML PRESSURE H74939359110

## (undated) DEVICE — SU VICRYL 2-0 CT-1 27" UND J259H

## (undated) DEVICE — WIPES FOLEY CARE SURESTEP PROVON DFC100

## (undated) DEVICE — ENDO BITE BLOCK ADULT OMNI-BLOC

## (undated) DEVICE — WIRE GUIDE 0.025"X270CM ANG VISIGLIDE G-240-2527A

## (undated) DEVICE — SYR 10ML LL W/O NDL 302995

## (undated) DEVICE — DRAIN JACKSON PRATT 15FR ROUND SU130-1323

## (undated) DEVICE — SU SILK 0 SH 30" K834H

## (undated) DEVICE — SU SILK 3-0 TIE 12X30" A304H

## (undated) DEVICE — SU VICRYL 2-0 SH 27" UND J417H

## (undated) DEVICE — SU SILK 4-0 TIE 12X30" A303H

## (undated) DEVICE — DRSG DRAIN 4X4" 7086

## (undated) DEVICE — BLADE SWITCH SCISSORS TIP 5MM 89-5100B

## (undated) DEVICE — DEFIB PRO-PADZ LVP LQD GEL ADULT 8900-2105-01

## (undated) DEVICE — SU PROLENE 4-0 RB-1DA 36" 8557H

## (undated) DEVICE — SU PDS II 1 TP-1 54" Z879G

## (undated) DEVICE — CLIP ENDO HEMO-LOC PURPLE LG 544240

## (undated) DEVICE — SUCTION TIP YANKAUER W/O VENT K86

## (undated) DEVICE — DRAIN JACKSON PRATT RESERVOIR 400ML SU130-1000

## (undated) DEVICE — CATH BALLOON NC EMERGE 3.50X20MM H7493926720350

## (undated) DEVICE — DRSG MEDIPORE 3 1/2X13 3/4" 3573

## (undated) DEVICE — DAVINCI S NDL DRIVER LARGE 420006

## (undated) DEVICE — SU SILK 2-0 SH 30" K833H

## (undated) DEVICE — DRAPE SLUSH/WARMER 66X44" ORS-320

## (undated) DEVICE — SU PROLENE 7-0 BV-1DA 30" 8703H

## (undated) DEVICE — SU MONOCRYL 4-0 PS-2 18" UND Y496G

## (undated) DEVICE — KIT HAND CONTROL ACIST 014644 AR-P54

## (undated) DEVICE — SU SILK 3-0 SH CR 8X18" C013D

## (undated) DEVICE — TUBING PRESSURE 30"

## (undated) DEVICE — ADH SKIN CLOSURE PREMIERPRO EXOFIN 1.0ML 3470

## (undated) DEVICE — STPL SKIN 35W 059037

## (undated) DEVICE — SU SILK 1 TIE 6X30" A307H

## (undated) DEVICE — SYR 70ML TOOMEY 041170

## (undated) DEVICE — SPONGE LAP 18X18" X8435

## (undated) DEVICE — VALVE HEMOSTASIS .096" COPILOT MECH 1003331

## (undated) DEVICE — LINEN GOWN X4 5410

## (undated) DEVICE — CLIP HORIZON SM RED WIDE SLOT 001201

## (undated) DEVICE — SU SILK 3-0 SH 30" K832H

## (undated) DEVICE — TAPE DURAPORE ADVANCED PURPLE 3" 1590-3

## (undated) DEVICE — BLADE KNIFE SURG 10 371110

## (undated) DEVICE — DAVINCI S GRASPER ENDOWRIST PROGRASP 420093

## (undated) DEVICE — LABEL MEDICATION SYSTEM 3303-P

## (undated) DEVICE — ESU GROUND PAD UNIVERSAL W/O CORD

## (undated) DEVICE — TAPE CLOTH 3" CARDINAL 3TRCL03

## (undated) DEVICE — DAVINCI S CANNULA SEAL 8MM 400077

## (undated) DEVICE — SU SILK 2-0 TIE 12X30" A305H

## (undated) DEVICE — STATLOCK PSI DEVICE

## (undated) DEVICE — DRAPE IOBAN INCISE 23X17" 6650EZ

## (undated) DEVICE — DRSG TELFA 3X8" 1238

## (undated) DEVICE — SU ETHILON 2-0 PS 18" 585H

## (undated) DEVICE — KIT ACCESSORY INTRO INFLATION SYS 20/30 PRIORITY 1000186-115

## (undated) DEVICE — SU PROLENE 3-0 SHDA 36" 8522H

## (undated) DEVICE — Device

## (undated) DEVICE — ESU PENCIL W/COATED BLADE E2450H

## (undated) DEVICE — CATH TRAY FOLEY SURESTEP 16FR WDRAIN BAG STLK LATEX A300316A

## (undated) DEVICE — BLADE CLIPPER SGL USE 9680

## (undated) DEVICE — GOWN IMPERVIOUS SPECIALTY XLG/XLONG 32474

## (undated) DEVICE — STPL ENDO TA30 2.5MM MULTIFIRE 010901

## (undated) DEVICE — SU VICRYL 0 CT-1 36" J346H

## (undated) DEVICE — KIT CONNECTOR FOR OLYMPUS ENDOSCOPES DEFENDO 100310

## (undated) DEVICE — DAVINCI S MONOPOLAR SCISSORS PRECURVED HOT SHEARS 420179

## (undated) DEVICE — BLADE KNIFE SURG 15 371115

## (undated) DEVICE — ENDO POUCH UNIV RETRIEVAL SYSTEM INZII 10MM CD001

## (undated) DEVICE — ESU GROUND PAD THERMOGUARD PLUS ABC PEDS 7-382

## (undated) DEVICE — SU VICRYL 3-0 SH 27" J316H

## (undated) DEVICE — CATH GD VISTA BRITE BLU YLW 100CM 6FR XB3.5 6700540E

## (undated) DEVICE — KIT POSITIONER PINK PAD XL ADVANCED 40588

## (undated) DEVICE — DRSG PRIMAPORE 02X3" 7133

## (undated) DEVICE — CATH GUIDING BLUE YELLOW PTFE XB3.5 6FRX100CM 67005400

## (undated) DEVICE — ENDO FUSION OMNI-TOME G31903

## (undated) DEVICE — ENDO CAP AND TUBING STERILE FOR ENDOGATOR  100130

## (undated) DEVICE — TUBING IRRIG CYSTO/BLADDER SET 81" LF 2C4040

## (undated) DEVICE — DRAIN PENROSE 0.25"X18" LATEX FREE GR201

## (undated) DEVICE — COVER CAMERA IN-LIGHT DISP LT-C02

## (undated) DEVICE — LIGHT HANDLE X1 31140133

## (undated) DEVICE — SU ETHILON 3-0 PS-1 18" 1663H

## (undated) DEVICE — SU SILK 2-0 SH CR 5X18" C0125

## (undated) DEVICE — ENDO SNARE POLYPECTOMY OVAL 15MM LOOP SD-240U-15

## (undated) DEVICE — CATH CORONARY LITHOTRIPSY SHOCKWAVE 3.5X12MM C2IVL3512

## (undated) DEVICE — GOWN XLG DISP 9545

## (undated) DEVICE — PAD CHUX UNDERPAD 30X36" P3036C

## (undated) DEVICE — SURGICEL HEMOSTAT 4X8" 1952

## (undated) DEVICE — DAVINCI HOT SHEARS TIP COVER  400180

## (undated) DEVICE — CLIP HORIZON MED BLUE 002200

## (undated) DEVICE — DAVINCI SI DRAPE ACCESSORY KIT 3-ARM 420290

## (undated) DEVICE — SU PROLENE 5-0 RB-1DA 36"  8556H

## (undated) DEVICE — FASTENER CATH BALLOON CLAMPX2 STATLOCK 0684-00-493

## (undated) DEVICE — TUBE NASOGASTRIC ENTRIFLEX 10FR 43"

## (undated) DEVICE — GLOVE PROTEXIS W/NEU-THERA 7.5  2D73TE75

## (undated) DEVICE — SU PDS II 6-0 RB-2DA 30" Z149H

## (undated) DEVICE — CATH GUIDING IKARI 6FR IL3.5 LEFT HEARTRAIL 40-6370

## (undated) DEVICE — WIRE GUIDE 0.025"X270CM STR VISIGLIDE G-240-2527S

## (undated) DEVICE — PITCHER STERILE 1000ML  SSK9004A

## (undated) DEVICE — TUBING IRR TUR Y TYPE 2C4041

## (undated) DEVICE — SU PROLENE 3-0 SH-1 30" 8762H

## (undated) DEVICE — SU SILK 5-0 TIE 12X30" A302H

## (undated) DEVICE — SURGICEL ABSORBABLE HEMOSTAT SNOW 4"X4" 2083

## (undated) DEVICE — ENDO TROCAR FIRST ENTRY KII FIOS Z-THRD 12X100MM CTF73

## (undated) DEVICE — ENDO TROCAR FIRST ENTRY KII FIOS ADV FIX 12X100MM CFF73

## (undated) DEVICE — NDL INSUFFLATION 13GA 120MM C2201

## (undated) DEVICE — NDL COUNTER 20CT 31142493

## (undated) DEVICE — SU PROLENE 4-0 SHDA 36" 8521H

## (undated) DEVICE — DRSG TEGADERM 8X12" 1629

## (undated) DEVICE — SPONGE SURGIFOAM 100 1974

## (undated) DEVICE — SU VICRYL 0 UR-6 27" J603H

## (undated) DEVICE — STPL SKIN 35W ROTATING HEAD PRW35

## (undated) DEVICE — ESU CORD BIPOLAR GREEN 10-4000

## (undated) DEVICE — SU MONOCRYL 3-0 RB-1 27" UND Y215H

## (undated) DEVICE — SU MONOCRYL 3-0 UR-6 27" Y604H

## (undated) DEVICE — ESU LIGASURE IMPACT OPEN SEALER/DVDR CVD LG JAW LF4418

## (undated) DEVICE — SU PROLENE 1 CTX 30" 8455H

## (undated) DEVICE — GW VASC .035IN DIA 260CML 7CML 3 MM RADIUS J CURVE 502455

## (undated) RX ORDER — EPHEDRINE SULFATE 50 MG/ML
INJECTION, SOLUTION INTRAMUSCULAR; INTRAVENOUS; SUBCUTANEOUS
Status: DISPENSED
Start: 2018-12-22

## (undated) RX ORDER — FENTANYL CITRATE 50 UG/ML
INJECTION, SOLUTION INTRAMUSCULAR; INTRAVENOUS
Status: DISPENSED
Start: 2018-12-22

## (undated) RX ORDER — FENTANYL CITRATE 50 UG/ML
INJECTION, SOLUTION INTRAMUSCULAR; INTRAVENOUS
Status: DISPENSED
Start: 2019-02-18

## (undated) RX ORDER — CEFAZOLIN SODIUM 2 G/100ML
INJECTION, SOLUTION INTRAVENOUS
Status: DISPENSED
Start: 2020-01-03

## (undated) RX ORDER — HEPARIN SODIUM 1000 [USP'U]/ML
INJECTION, SOLUTION INTRAVENOUS; SUBCUTANEOUS
Status: DISPENSED
Start: 2021-10-13

## (undated) RX ORDER — FENTANYL CITRATE 50 UG/ML
INJECTION, SOLUTION INTRAMUSCULAR; INTRAVENOUS
Status: DISPENSED
Start: 2020-01-03

## (undated) RX ORDER — INDOMETHACIN 50 MG/1
SUPPOSITORY RECTAL
Status: DISPENSED
Start: 2019-02-18

## (undated) RX ORDER — FENTANYL CITRATE 50 UG/ML
INJECTION, SOLUTION INTRAMUSCULAR; INTRAVENOUS
Status: DISPENSED
Start: 2021-10-13

## (undated) RX ORDER — LIDOCAINE HYDROCHLORIDE 20 MG/ML
INJECTION, SOLUTION EPIDURAL; INFILTRATION; INTRACAUDAL; PERINEURAL
Status: DISPENSED
Start: 2019-04-29

## (undated) RX ORDER — CEFAZOLIN SODIUM 1 G/3ML
INJECTION, POWDER, FOR SOLUTION INTRAMUSCULAR; INTRAVENOUS
Status: DISPENSED
Start: 2020-01-03

## (undated) RX ORDER — GABAPENTIN 300 MG/1
CAPSULE ORAL
Status: DISPENSED
Start: 2020-01-03

## (undated) RX ORDER — HYDROMORPHONE HYDROCHLORIDE 1 MG/ML
INJECTION, SOLUTION INTRAMUSCULAR; INTRAVENOUS; SUBCUTANEOUS
Status: DISPENSED
Start: 2018-12-22

## (undated) RX ORDER — NICARDIPINE HCL-0.9% SOD CHLOR 1 MG/10 ML
SYRINGE (ML) INTRAVENOUS
Status: DISPENSED
Start: 2025-03-09

## (undated) RX ORDER — SCOLOPAMINE TRANSDERMAL SYSTEM 1 MG/1
PATCH, EXTENDED RELEASE TRANSDERMAL
Status: DISPENSED
Start: 2018-06-13

## (undated) RX ORDER — FENTANYL CITRATE 50 UG/ML
INJECTION, SOLUTION INTRAMUSCULAR; INTRAVENOUS
Status: DISPENSED
Start: 2018-07-02

## (undated) RX ORDER — HEPARIN SODIUM 200 [USP'U]/100ML
INJECTION, SOLUTION INTRAVENOUS
Status: DISPENSED
Start: 2025-03-09

## (undated) RX ORDER — ONDANSETRON 2 MG/ML
INJECTION INTRAMUSCULAR; INTRAVENOUS
Status: DISPENSED
Start: 2019-02-18

## (undated) RX ORDER — METOPROLOL TARTRATE 1 MG/ML
INJECTION, SOLUTION INTRAVENOUS
Status: DISPENSED
Start: 2018-06-20

## (undated) RX ORDER — SODIUM CHLORIDE 9 MG/ML
INJECTION, SOLUTION INTRAVENOUS
Status: DISPENSED
Start: 2018-06-13

## (undated) RX ORDER — FENTANYL CITRATE 50 UG/ML
INJECTION, SOLUTION INTRAMUSCULAR; INTRAVENOUS
Status: DISPENSED
Start: 2019-04-29

## (undated) RX ORDER — ONDANSETRON 2 MG/ML
INJECTION INTRAMUSCULAR; INTRAVENOUS
Status: DISPENSED
Start: 2018-06-13

## (undated) RX ORDER — ONDANSETRON 2 MG/ML
INJECTION INTRAMUSCULAR; INTRAVENOUS
Status: DISPENSED
Start: 2019-04-29

## (undated) RX ORDER — HEPARIN SODIUM 1000 [USP'U]/ML
INJECTION, SOLUTION INTRAVENOUS; SUBCUTANEOUS
Status: DISPENSED
Start: 2025-03-09

## (undated) RX ORDER — INDOMETHACIN 50 MG/1
SUPPOSITORY RECTAL
Status: DISPENSED
Start: 2019-04-29

## (undated) RX ORDER — PHENYLEPHRINE HCL IN 0.9% NACL 1 MG/10 ML
SYRINGE (ML) INTRAVENOUS
Status: DISPENSED
Start: 2018-12-22

## (undated) RX ORDER — HYDROMORPHONE HYDROCHLORIDE 1 MG/ML
INJECTION, SOLUTION INTRAMUSCULAR; INTRAVENOUS; SUBCUTANEOUS
Status: DISPENSED
Start: 2020-01-03

## (undated) RX ORDER — LIDOCAINE HYDROCHLORIDE 20 MG/ML
JELLY TOPICAL
Status: DISPENSED
Start: 2021-07-09

## (undated) RX ORDER — CEFAZOLIN SODIUM 1 G/3ML
INJECTION, POWDER, FOR SOLUTION INTRAMUSCULAR; INTRAVENOUS
Status: DISPENSED
Start: 2018-07-02

## (undated) RX ORDER — NITROGLYCERIN 5 MG/ML
VIAL (ML) INTRAVENOUS
Status: DISPENSED
Start: 2021-10-13

## (undated) RX ORDER — DEXAMETHASONE SODIUM PHOSPHATE 4 MG/ML
INJECTION, SOLUTION INTRA-ARTICULAR; INTRALESIONAL; INTRAMUSCULAR; INTRAVENOUS; SOFT TISSUE
Status: DISPENSED
Start: 2019-04-29

## (undated) RX ORDER — HYDROMORPHONE HYDROCHLORIDE 1 MG/ML
INJECTION, SOLUTION INTRAMUSCULAR; INTRAVENOUS; SUBCUTANEOUS
Status: DISPENSED
Start: 2018-07-02

## (undated) RX ORDER — LIDOCAINE 40 MG/G
CREAM TOPICAL
Status: DISPENSED
Start: 2018-08-15

## (undated) RX ORDER — LIDOCAINE HYDROCHLORIDE 10 MG/ML
INJECTION, SOLUTION EPIDURAL; INFILTRATION; INTRACAUDAL; PERINEURAL
Status: DISPENSED
Start: 2024-03-05

## (undated) RX ORDER — FENTANYL CITRATE 50 UG/ML
INJECTION, SOLUTION INTRAMUSCULAR; INTRAVENOUS
Status: DISPENSED
Start: 2025-03-09

## (undated) RX ORDER — NITROGLYCERIN 5 MG/ML
VIAL (ML) INTRAVENOUS
Status: DISPENSED
Start: 2025-03-09

## (undated) RX ORDER — LIDOCAINE HYDROCHLORIDE 10 MG/ML
INJECTION, SOLUTION EPIDURAL; INFILTRATION; INTRACAUDAL; PERINEURAL
Status: DISPENSED
Start: 2018-08-15

## (undated) RX ORDER — FENTANYL CITRATE 50 UG/ML
INJECTION, SOLUTION INTRAMUSCULAR; INTRAVENOUS
Status: DISPENSED
Start: 2018-12-28

## (undated) RX ORDER — LIDOCAINE HYDROCHLORIDE 10 MG/ML
INJECTION, SOLUTION INFILTRATION; PERINEURAL
Status: DISPENSED
Start: 2018-12-31

## (undated) RX ORDER — NICARDIPINE HCL-0.9% SOD CHLOR 1 MG/10 ML
SYRINGE (ML) INTRAVENOUS
Status: DISPENSED
Start: 2021-10-13

## (undated) RX ORDER — ALBUTEROL SULFATE 0.83 MG/ML
SOLUTION RESPIRATORY (INHALATION)
Status: DISPENSED
Start: 2018-12-28

## (undated) RX ORDER — DEXAMETHASONE SODIUM PHOSPHATE 4 MG/ML
INJECTION, SOLUTION INTRA-ARTICULAR; INTRALESIONAL; INTRAMUSCULAR; INTRAVENOUS; SOFT TISSUE
Status: DISPENSED
Start: 2018-12-22

## (undated) RX ORDER — ONDANSETRON 2 MG/ML
INJECTION INTRAMUSCULAR; INTRAVENOUS
Status: DISPENSED
Start: 2018-12-22

## (undated) RX ORDER — LIDOCAINE HYDROCHLORIDE 10 MG/ML
INJECTION, SOLUTION EPIDURAL; INFILTRATION; INTRACAUDAL; PERINEURAL
Status: DISPENSED
Start: 2025-03-09

## (undated) RX ORDER — SODIUM CHLORIDE FOR INHALATION 10 %
VIAL, NEBULIZER (ML) INHALATION
Status: DISPENSED
Start: 2024-08-13

## (undated) RX ORDER — SIMETHICONE 40MG/0.6ML
SUSPENSION, DROPS(FINAL DOSAGE FORM)(ML) ORAL
Status: DISPENSED
Start: 2019-02-18

## (undated) RX ORDER — HEPARIN SODIUM 1000 [USP'U]/ML
INJECTION, SOLUTION INTRAVENOUS; SUBCUTANEOUS
Status: DISPENSED
Start: 2018-12-22

## (undated) RX ORDER — ONDANSETRON 2 MG/ML
INJECTION INTRAMUSCULAR; INTRAVENOUS
Status: DISPENSED
Start: 2018-07-02

## (undated) RX ORDER — ACETAMINOPHEN 325 MG/1
TABLET ORAL
Status: DISPENSED
Start: 2020-01-03

## (undated) RX ORDER — LIDOCAINE HYDROCHLORIDE 10 MG/ML
INJECTION, SOLUTION EPIDURAL; INFILTRATION; INTRACAUDAL; PERINEURAL
Status: DISPENSED
Start: 2019-07-26

## (undated) RX ORDER — PAPAVERINE HYDROCHLORIDE 30 MG/ML
INJECTION INTRAMUSCULAR; INTRAVENOUS
Status: DISPENSED
Start: 2018-12-22

## (undated) RX ORDER — ALBUMIN, HUMAN INJ 5% 5 %
SOLUTION INTRAVENOUS
Status: DISPENSED
Start: 2018-12-22

## (undated) RX ORDER — LIDOCAINE HYDROCHLORIDE 10 MG/ML
INJECTION, SOLUTION EPIDURAL; INFILTRATION; INTRACAUDAL; PERINEURAL
Status: DISPENSED
Start: 2018-07-02

## (undated) RX ORDER — CIPROFLOXACIN 2 MG/ML
INJECTION, SOLUTION INTRAVENOUS
Status: DISPENSED
Start: 2019-04-29

## (undated) RX ORDER — ALBUTEROL SULFATE 0.83 MG/ML
SOLUTION RESPIRATORY (INHALATION)
Status: DISPENSED
Start: 2024-08-13

## (undated) RX ORDER — PROPOFOL 10 MG/ML
INJECTION, EMULSION INTRAVENOUS
Status: DISPENSED
Start: 2018-12-22

## (undated) RX ORDER — CEFAZOLIN SODIUM 2 G/100ML
INJECTION, SOLUTION INTRAVENOUS
Status: DISPENSED
Start: 2018-07-02

## (undated) RX ORDER — FENTANYL CITRATE 50 UG/ML
INJECTION, SOLUTION INTRAMUSCULAR; INTRAVENOUS
Status: DISPENSED
Start: 2024-03-05

## (undated) RX ORDER — FENTANYL CITRATE 50 UG/ML
INJECTION, SOLUTION INTRAMUSCULAR; INTRAVENOUS
Status: DISPENSED
Start: 2018-06-13

## (undated) RX ORDER — PROPOFOL 10 MG/ML
INJECTION, EMULSION INTRAVENOUS
Status: DISPENSED
Start: 2019-04-29

## (undated) RX ORDER — LIDOCAINE HYDROCHLORIDE 10 MG/ML
INJECTION, SOLUTION EPIDURAL; INFILTRATION; INTRACAUDAL; PERINEURAL
Status: DISPENSED
Start: 2018-06-13

## (undated) RX ORDER — PROPOFOL 10 MG/ML
INJECTION, EMULSION INTRAVENOUS
Status: DISPENSED
Start: 2019-02-18

## (undated) RX ORDER — AMPICILLIN AND SULBACTAM 2; 1 G/1; G/1
INJECTION, POWDER, FOR SOLUTION INTRAMUSCULAR; INTRAVENOUS
Status: DISPENSED
Start: 2018-06-13

## (undated) RX ORDER — DOBUTAMINE HYDROCHLORIDE 200 MG/100ML
INJECTION INTRAVENOUS
Status: DISPENSED
Start: 2018-06-20

## (undated) RX ORDER — LIDOCAINE HYDROCHLORIDE 20 MG/ML
INJECTION, SOLUTION EPIDURAL; INFILTRATION; INTRACAUDAL; PERINEURAL
Status: DISPENSED
Start: 2018-12-22

## (undated) RX ORDER — LIDOCAINE HYDROCHLORIDE 20 MG/ML
INJECTION, SOLUTION EPIDURAL; INFILTRATION; INTRACAUDAL; PERINEURAL
Status: DISPENSED
Start: 2019-02-18

## (undated) RX ORDER — GENTAMICIN 40 MG/ML
INJECTION, SOLUTION INTRAMUSCULAR; INTRAVENOUS
Status: DISPENSED
Start: 2019-07-26

## (undated) RX ORDER — BUPIVACAINE HYDROCHLORIDE 2.5 MG/ML
INJECTION, SOLUTION EPIDURAL; INFILTRATION; INTRACAUDAL
Status: DISPENSED
Start: 2020-01-03

## (undated) RX ORDER — SODIUM CHLORIDE 9 MG/ML
INJECTION, SOLUTION INTRAVENOUS
Status: DISPENSED
Start: 2021-10-13

## (undated) RX ORDER — ONDANSETRON 2 MG/ML
INJECTION INTRAMUSCULAR; INTRAVENOUS
Status: DISPENSED
Start: 2018-12-28

## (undated) RX ORDER — HYDRALAZINE HYDROCHLORIDE 20 MG/ML
INJECTION INTRAMUSCULAR; INTRAVENOUS
Status: DISPENSED
Start: 2020-01-03

## (undated) RX ORDER — NITROGLYCERIN 5 MG/ML
VIAL (ML) INTRAVENOUS
Status: DISPENSED
Start: 2018-06-13